# Patient Record
Sex: MALE | Race: WHITE | NOT HISPANIC OR LATINO | Employment: OTHER | URBAN - METROPOLITAN AREA
[De-identification: names, ages, dates, MRNs, and addresses within clinical notes are randomized per-mention and may not be internally consistent; named-entity substitution may affect disease eponyms.]

---

## 2017-01-06 ENCOUNTER — GENERIC CONVERSION - ENCOUNTER (OUTPATIENT)
Dept: OTHER | Facility: OTHER | Age: 67
End: 2017-01-06

## 2017-01-06 LAB
INR PPP: 1.7
PROTHROMBIN TIME: 17.6 S

## 2017-01-26 ENCOUNTER — GENERIC CONVERSION - ENCOUNTER (OUTPATIENT)
Dept: OTHER | Facility: OTHER | Age: 67
End: 2017-01-26

## 2017-01-26 LAB
INR PPP: 2.2 (ref 0.8–1.2)
PROTHROMBIN TIME: 22.4 SEC (ref 9.1–12)

## 2017-01-27 ENCOUNTER — GENERIC CONVERSION - ENCOUNTER (OUTPATIENT)
Dept: OTHER | Facility: OTHER | Age: 67
End: 2017-01-27

## 2017-02-23 ENCOUNTER — GENERIC CONVERSION - ENCOUNTER (OUTPATIENT)
Dept: OTHER | Facility: OTHER | Age: 67
End: 2017-02-23

## 2017-02-23 LAB
INR PPP: 1.7 (ref 0.8–1.2)
PROTHROMBIN TIME: 17.8 SEC (ref 9.1–12)

## 2017-02-26 ENCOUNTER — GENERIC CONVERSION - ENCOUNTER (OUTPATIENT)
Dept: OTHER | Facility: OTHER | Age: 67
End: 2017-02-26

## 2017-03-09 ENCOUNTER — GENERIC CONVERSION - ENCOUNTER (OUTPATIENT)
Dept: OTHER | Facility: OTHER | Age: 67
End: 2017-03-09

## 2017-03-09 LAB
INR PPP: 2.1 (ref 0.8–1.2)
PROTHROMBIN TIME: 21.4 SEC (ref 9.1–12)

## 2017-03-10 ENCOUNTER — ALLSCRIPTS OFFICE VISIT (OUTPATIENT)
Dept: OTHER | Facility: OTHER | Age: 67
End: 2017-03-10

## 2017-03-10 ENCOUNTER — GENERIC CONVERSION - ENCOUNTER (OUTPATIENT)
Dept: OTHER | Facility: OTHER | Age: 67
End: 2017-03-10

## 2017-03-23 ENCOUNTER — GENERIC CONVERSION - ENCOUNTER (OUTPATIENT)
Dept: OTHER | Facility: OTHER | Age: 67
End: 2017-03-23

## 2017-03-23 LAB
INR PPP: 3 (ref 0.8–1.2)
PROTHROMBIN TIME: 30.7 SEC (ref 9.1–12)

## 2017-03-24 ENCOUNTER — GENERIC CONVERSION - ENCOUNTER (OUTPATIENT)
Dept: OTHER | Facility: OTHER | Age: 67
End: 2017-03-24

## 2017-04-06 ENCOUNTER — GENERIC CONVERSION - ENCOUNTER (OUTPATIENT)
Dept: OTHER | Facility: OTHER | Age: 67
End: 2017-04-06

## 2017-04-07 LAB
INR PPP: 2.1 (ref 0.8–1.2)
PROTHROMBIN TIME: 22 SEC (ref 9.1–12)

## 2017-04-21 ENCOUNTER — GENERIC CONVERSION - ENCOUNTER (OUTPATIENT)
Dept: OTHER | Facility: OTHER | Age: 67
End: 2017-04-21

## 2017-04-21 LAB
INR PPP: 1.8 (ref 0.8–1.2)
PROTHROMBIN TIME: 18.6 SEC (ref 9.1–12)

## 2017-04-24 ENCOUNTER — ALLSCRIPTS OFFICE VISIT (OUTPATIENT)
Dept: OTHER | Facility: OTHER | Age: 67
End: 2017-04-24

## 2017-04-24 ENCOUNTER — GENERIC CONVERSION - ENCOUNTER (OUTPATIENT)
Dept: OTHER | Facility: OTHER | Age: 67
End: 2017-04-24

## 2017-04-24 DIAGNOSIS — M17.0 PRIMARY OSTEOARTHRITIS OF BOTH KNEES: ICD-10-CM

## 2017-04-25 ENCOUNTER — GENERIC CONVERSION - ENCOUNTER (OUTPATIENT)
Dept: OTHER | Facility: OTHER | Age: 67
End: 2017-04-25

## 2017-05-01 ENCOUNTER — TRANSCRIBE ORDERS (OUTPATIENT)
Dept: ADMINISTRATIVE | Facility: HOSPITAL | Age: 67
End: 2017-05-01

## 2017-05-01 ENCOUNTER — HOSPITAL ENCOUNTER (OUTPATIENT)
Dept: RADIOLOGY | Facility: HOSPITAL | Age: 67
Discharge: HOME | End: 2017-05-01
Payer: COMMERCIAL

## 2017-05-01 DIAGNOSIS — M17.0 PRIMARY OSTEOARTHRITIS OF BOTH KNEES: ICD-10-CM

## 2017-05-01 PROCEDURE — 73562 X-RAY EXAM OF KNEE 3: CPT

## 2017-05-03 ENCOUNTER — GENERIC CONVERSION - ENCOUNTER (OUTPATIENT)
Dept: OTHER | Facility: OTHER | Age: 67
End: 2017-05-03

## 2017-05-04 ENCOUNTER — GENERIC CONVERSION - ENCOUNTER (OUTPATIENT)
Dept: OTHER | Facility: OTHER | Age: 67
End: 2017-05-04

## 2017-05-04 LAB
INR PPP: 1.9 (ref 0.8–1.2)
PROTHROMBIN TIME: 19.4 SEC (ref 9.1–12)

## 2017-05-07 ENCOUNTER — GENERIC CONVERSION - ENCOUNTER (OUTPATIENT)
Dept: OTHER | Facility: OTHER | Age: 67
End: 2017-05-07

## 2017-05-08 ENCOUNTER — ALLSCRIPTS OFFICE VISIT (OUTPATIENT)
Dept: OTHER | Facility: OTHER | Age: 67
End: 2017-05-08

## 2017-05-09 ENCOUNTER — ALLSCRIPTS OFFICE VISIT (OUTPATIENT)
Dept: OTHER | Facility: OTHER | Age: 67
End: 2017-05-09

## 2017-05-25 ENCOUNTER — GENERIC CONVERSION - ENCOUNTER (OUTPATIENT)
Dept: OTHER | Facility: OTHER | Age: 67
End: 2017-05-25

## 2017-05-25 LAB
INR PPP: 2.4 (ref 0.8–1.2)
PROTHROMBIN TIME: 24.8 SEC (ref 9.1–12)

## 2017-05-30 ENCOUNTER — GENERIC CONVERSION - ENCOUNTER (OUTPATIENT)
Dept: OTHER | Facility: OTHER | Age: 67
End: 2017-05-30

## 2017-06-05 ENCOUNTER — GENERIC CONVERSION - ENCOUNTER (OUTPATIENT)
Dept: OTHER | Facility: OTHER | Age: 67
End: 2017-06-05

## 2017-06-08 ENCOUNTER — GENERIC CONVERSION - ENCOUNTER (OUTPATIENT)
Dept: OTHER | Facility: OTHER | Age: 67
End: 2017-06-08

## 2017-06-09 LAB
INR PPP: 2.7 (ref 0.8–1.2)
PROTHROMBIN TIME: 28.2 SEC (ref 9.1–12)

## 2017-06-12 ENCOUNTER — GENERIC CONVERSION - ENCOUNTER (OUTPATIENT)
Dept: OTHER | Facility: OTHER | Age: 67
End: 2017-06-12

## 2017-06-22 ENCOUNTER — GENERIC CONVERSION - ENCOUNTER (OUTPATIENT)
Dept: OTHER | Facility: OTHER | Age: 67
End: 2017-06-22

## 2017-06-23 LAB
INR PPP: 2.5 (ref 0.8–1.2)
PROTHROMBIN TIME: 25.3 SEC (ref 9.1–12)

## 2017-06-25 ENCOUNTER — GENERIC CONVERSION - ENCOUNTER (OUTPATIENT)
Dept: OTHER | Facility: OTHER | Age: 67
End: 2017-06-25

## 2017-07-06 ENCOUNTER — GENERIC CONVERSION - ENCOUNTER (OUTPATIENT)
Dept: OTHER | Facility: OTHER | Age: 67
End: 2017-07-06

## 2017-07-06 LAB
INR PPP: 1.9 (ref 0.8–1.2)
PROTHROMBIN TIME: 20.1 SEC (ref 9.1–12)

## 2017-07-07 ENCOUNTER — GENERIC CONVERSION - ENCOUNTER (OUTPATIENT)
Dept: OTHER | Facility: OTHER | Age: 67
End: 2017-07-07

## 2017-07-20 LAB
INR PPP: 1.6 (ref 0.8–1.2)
PROTHROMBIN TIME: 17.1 SEC (ref 9.1–12)

## 2017-07-21 ENCOUNTER — GENERIC CONVERSION - ENCOUNTER (OUTPATIENT)
Dept: OTHER | Facility: OTHER | Age: 67
End: 2017-07-21

## 2017-08-04 ENCOUNTER — GENERIC CONVERSION - ENCOUNTER (OUTPATIENT)
Dept: OTHER | Facility: OTHER | Age: 67
End: 2017-08-04

## 2017-08-04 LAB
INR PPP: 2.4 (ref 0.8–1.2)
PROTHROMBIN TIME: 23.5 SEC (ref 9.1–12)

## 2017-08-15 ENCOUNTER — ALLSCRIPTS OFFICE VISIT (OUTPATIENT)
Dept: OTHER | Facility: OTHER | Age: 67
End: 2017-08-15

## 2017-08-15 ENCOUNTER — TRANSCRIBE ORDERS (OUTPATIENT)
Dept: ADMINISTRATIVE | Facility: HOSPITAL | Age: 67
End: 2017-08-15

## 2017-08-15 DIAGNOSIS — G47.33 OBSTRUCTIVE SLEEP APNEA (ADULT) (PEDIATRIC): Primary | ICD-10-CM

## 2017-08-17 LAB
INR PPP: 1.9 (ref 0.8–1.2)
PROTHROMBIN TIME: 19.1 SEC (ref 9.1–12)

## 2017-08-18 ENCOUNTER — GENERIC CONVERSION - ENCOUNTER (OUTPATIENT)
Dept: OTHER | Facility: OTHER | Age: 67
End: 2017-08-18

## 2017-09-07 LAB
INR PPP: 2 (ref 0.8–1.2)
PROTHROMBIN TIME: 19.8 SEC (ref 9.1–12)

## 2017-09-17 ENCOUNTER — HOSPITAL ENCOUNTER (OUTPATIENT)
Dept: SLEEP CENTER | Facility: HOSPITAL | Age: 67
Discharge: HOME | End: 2017-09-17
Attending: INTERNAL MEDICINE
Payer: COMMERCIAL

## 2017-09-17 DIAGNOSIS — G47.33 OBSTRUCTIVE SLEEP APNEA (ADULT) (PEDIATRIC): ICD-10-CM

## 2017-09-17 PROCEDURE — 95811 POLYSOM 6/>YRS CPAP 4/> PARM: CPT

## 2017-09-26 ENCOUNTER — GENERIC CONVERSION - ENCOUNTER (OUTPATIENT)
Dept: OTHER | Facility: OTHER | Age: 67
End: 2017-09-26

## 2017-09-28 LAB
INR PPP: 2.3 (ref 0.8–1.2)
PROTHROMBIN TIME: 22.5 SEC (ref 9.1–12)

## 2017-09-29 ENCOUNTER — GENERIC CONVERSION - ENCOUNTER (OUTPATIENT)
Dept: OTHER | Facility: OTHER | Age: 67
End: 2017-09-29

## 2017-10-12 LAB
INR PPP: 3.2 (ref 0.8–1.2)
PROTHROMBIN TIME: 31.1 SEC (ref 9.1–12)

## 2017-10-13 ENCOUNTER — GENERIC CONVERSION - ENCOUNTER (OUTPATIENT)
Dept: OTHER | Facility: OTHER | Age: 67
End: 2017-10-13

## 2017-10-25 LAB
A/G RATIO (HISTORICAL): 1.8 (ref 1.2–2.2)
ALBUMIN SERPL BCP-MCNC: 4.4 G/DL (ref 3.6–4.8)
ALP SERPL-CCNC: 36 IU/L (ref 39–117)
ALT SERPL W P-5'-P-CCNC: 20 IU/L (ref 0–44)
AST SERPL W P-5'-P-CCNC: 23 IU/L (ref 0–40)
BILIRUB SERPL-MCNC: 0.6 MG/DL (ref 0–1.2)
BUN SERPL-MCNC: 16 MG/DL (ref 8–27)
BUN/CREA RATIO (HISTORICAL): 13 (ref 10–24)
CALCIUM SERPL-MCNC: 9.6 MG/DL (ref 8.6–10.2)
CHLORIDE SERPL-SCNC: 102 MMOL/L (ref 96–106)
CHOLEST SERPL-MCNC: 151 MG/DL (ref 100–199)
CHOLEST/HDLC SERPL: 3.4 RATIO UNITS (ref 0–5)
CO2 SERPL-SCNC: 26 MMOL/L (ref 18–29)
CREAT SERPL-MCNC: 1.24 MG/DL (ref 0.76–1.27)
EGFR AFRICAN AMERICAN (HISTORICAL): 70 ML/MIN/1.73
EGFR-AMERICAN CALC (HISTORICAL): 60 ML/MIN/1.73
GLUCOSE SERPL-MCNC: 102 MG/DL (ref 65–99)
HDLC SERPL-MCNC: 44 MG/DL
INR PPP: 2.3 (ref 0.8–1.2)
LDLC SERPL CALC-MCNC: 88 MG/DL (ref 0–99)
POTASSIUM SERPL-SCNC: 4.9 MMOL/L (ref 3.5–5.2)
PROTHROMBIN TIME: 22.9 SEC (ref 9.1–12)
SODIUM SERPL-SCNC: 144 MMOL/L (ref 134–144)
TOT. GLOBULIN, SERUM (HISTORICAL): 2.5 G/DL (ref 1.5–4.5)
TOTAL PROTEIN (HISTORICAL): 6.9 G/DL (ref 6–8.5)
TRIGL SERPL-MCNC: 94 MG/DL (ref 0–149)
VLDLC SERPL CALC-MCNC: 19 MG/DL (ref 5–40)

## 2017-10-26 ENCOUNTER — GENERIC CONVERSION - ENCOUNTER (OUTPATIENT)
Dept: OTHER | Facility: OTHER | Age: 67
End: 2017-10-26

## 2017-10-30 ENCOUNTER — ALLSCRIPTS OFFICE VISIT (OUTPATIENT)
Dept: OTHER | Facility: OTHER | Age: 67
End: 2017-10-30

## 2017-11-03 ENCOUNTER — ALLSCRIPTS OFFICE VISIT (OUTPATIENT)
Dept: OTHER | Facility: OTHER | Age: 67
End: 2017-11-03

## 2017-11-04 NOTE — PROGRESS NOTES
Assessment  Assessed    1  Atrial fibrillation (427 31) (I48 91)   2  Atrial flutter (427 32) (I48 92)   3  Catheter Ablation Atrial Flutter   4  Hypertension (401 9) (I10)   5  WILL (obstructive sleep apnea) (327 23) (G47 33)   6  Dyslipidemia (272 4) (E78 5)    Plan  Atrial fibrillation    · EKG/ECG- POC; Status:Complete;   Done: 05UQA8055 11:31AM   Perform: In Office; Last Updated Dc Shaver; 11/3/2017 11:31:16 AM;Ordered; For:Atrial fibrillation; Ordered By:Mike Murillo;   · Continue with our present treatment plan ; Status:Complete;   Done: 28CBI3119   Ordered; For:Atrial fibrillation; Ordered By:Mike Murillo;   · Restrict your sodium (salt) intake to 2 grams per day ; Status:Complete;   Done:  37PPY5426   Ordered; For:Atrial fibrillation; Ordered By:Mike Murillo;   · Weigh yourself every day ; Status:Complete;   Done: 31ZWA0415   Ordered; For:Atrial fibrillation; Ordered By:Mike Murillo;   · Follow-up visit in 4 Months Evaluation and Treatment  Follow-up  Status: Complete   Done: 42BFO4882   Ordered; For: Atrial fibrillation; Ordered By: Va Cain Performed:  Order Comments: pt on wait list Due: 37JDJ8277; Last Updated By: Isi Avendano; 11/3/2017 11:48:25 AM    Discussion/Summary  Cardiology Discussion Summary Free Text Note Form [facility]:   PAF - He remains in atrial fibrillation but his heart rate remained controlled  He has now finally sought consultation with sleep medicine, and has been on CPAP for the last 2 weeks  I am going to let him get treated for this for several months, and then we will discuss so rhythm control strategy for his atrial fibrillation  We will have back in 4 months  He has essentially no symptoms at this time other than fatigue  He will remain on Coumadin for stroke prevention  flujesse - [facility] is status post ablation for this  - He is tolerating the TriCor and pravastatin  His cholesterol panel has improved with this   We will continue to follow blood work along with his PCP  - I continue to encourage Skylar Arora to follow-up regarding mask fitting and meeting with the sleep physician  He has yet to do this  As stated above I'm referring him to sleep medicine  He should then have a follow-up CPAP titration sleep study  Counseling Documentation With Imm: The patient was counseled regarding diagnostic results,-- instructions for management,-- impressions  total time of encounter was 25 minutes  Chief Complaint  Chief Complaint Free Text Note Form: Six month Follow up with EKG      History of Present Illness  Cardiology HPI Free Text Note [facility]: Skylar Arora returns for followup given his history of paroxysmal atrial fibrillation  This was diagnosed at the time of a colonoscopy, performed by Dr Tiara Morgan a few years ago  After starting metoprolol, in follow-up he was back to sinus rhythm  Then in January 2015 Skylar Arora arrived in atrial flutter with 2:1 conduction  He however has no symptoms other than is ongoing fatigue  At that time I set him up for a cardioversion that next week  He converted without any problems  He then met Dr Jayne Cortes of our EP service  He then came back a week later and had his atrial flutter ablated  This went successfully  He was in sinus rhythm up until our last appointment in which he was found to be back in atrial fibrillation, with controlled rates  He has been on Coumadin for years given his history of a DVT and being found to have factor 5 Leiden mutation-homozygous  we 1st met him, he also went for a sleep study, and was found to have severe obstructive sleep apnea  He however did not tolerate the CPAP mask due to claustrophobia  I encouraged him to seek consultation with sleep medicine, and for a while he did not do this  However after our last appointment he did do this  He then had proper mask fitting, and now is on CPAP  He has been on it about 2 weeks  tells me he feels about the same as last visit   He also denies any symptoms of his atrial fibrillation or atrial flutter  He continues to have fatigue  He denies any palpitations, lightheadedness or syncope he denies any chest pain or worsening shortness of breath  Certainly he has shortness of breath with exertion particularly upper levels of exertion, which has not changed over the last couple years  He denies any lightheadedness or syncope  He denies any symptoms of congestive heart failure  He stopped the simvastatin due to side effects but he tolerated the pravastatin  Atrial Fibrillation (Follow-Up): The patient presents with paroxysmal atrial fibrillation  The treatment strategy for this patient is rhythm control  He states his atrial fibrillation has been stable since the last visit  He has no significant interval events  Symptoms: stable palpitations,-- denies chest pain,-- denies exercise intolerance,-- stable dyspnea on exertion-- and-- denies dizziness  Associated symptoms include no syncope,-- no focal neurologic deficit,-- no tendency for easy bleeding-- and-- no tendency for easy bruising  Monitoring: The patient has regular PT/INR checks-- and-- anticoagulation control has been good  Risks: no increased risk for falling  Medications: the patient is adherent with his medication regimen  -- He denies medication side effects  Disease Management: the patient is doing well with his goals  Atrial Flutter (Brief): The patient is being seen for an initial evaluation of atrial flutter  Symptoms:  fatigue  The patient is currently experiencing symptoms  No associated symptoms are reported  Current treatment includes beta blockers  By report, there is good compliance with treatment and good tolerance of treatment  Pertinent medical history:  atrial fibrillation  Hyperlipidemia (Follow-Up): The patient states his hyperlipidemia has been under good control since the last visit  He has no significant interval events  Symptoms: The patient is currently asymptomatic   Associated symptoms include focal neurologic deficit, but-- no memory loss  Medications: the patient is not adherent with his medication regimen  -- the patient complains of medication side effects  Medication side effects: muscle aches  the patient's last LDL was 98 mg/dL  The patient is due for a lipid panel-- and-- liver function tests  Review of Systems  Cardiology Male ROS:     Cardiac: palpitations present , but-- as noted in HPI  Skin: No complaints of nonhealing sores or skin rash  Genitourinary: No complaints of recurrent urinary tract infections, frequent urination at night, difficult urination, blood in urine, kidney stones, loss of bladder control, no kidney or prostate problems, no erectile dysfunction  Psychological: No complaints of feeling depressed, anxiety, panic attacks, or difficulty concentrating  General: No complaints of trouble sleeping, lack of energy, fatigue, appetite changes, weight changes, fever, frequent infections, or night sweats  Respiratory: shortness of breath, but-- as noted in HPI  HEENT: snoring, but-- as noted in HPI   Gastrointestinal: No complaints of liver problems, nausea, vomiting, heartburn, constipation, bloody stools, diarrhea, problems swallowing, adbominal pain, or rectal bleeding  Hematologic: No complaints of bleeding disorders, anemia, blood clots, or excessive brusing  Neurological: No complaints of numbness, tingling, dizziness, weakness, seizures, headaches, syncope or fainting, AM fatigue, daytime sleepiness, no witnessed apnea episodes  Musculoskeletal: No complaints of arthritis, back pain, or painfull swelling  ROS Reviewed:   ROS reviewed  Active Problems  Problems    1  Anticoagulated on warfarin (V58 61) (Z79 01)   2  Atrial fibrillation (427 31) (I48 91)   3  Atrial flutter (427 32) (I48 92)   4  Catheter Ablation Atrial Flutter   5  Depression, major, single episode, moderate (296 22) (F32 1)   6  Dyslipidemia (272 4) (E78 5)   7  Encounter for screening colonoscopy (V76 51) (Z12 11)   8  Erectile dysfunction, unspecified erectile dysfunction type (607 84) (N52 9)   9  Factor V Leiden mutation (289 81) (D68 51)   10  Flu vaccine need (V04 81) (Z23)   11  Hypertension (401 9) (I10)   12  Insomnia (780 52) (G47 00)   13  Lumbar herniated disc (722 10) (M51 26)   14  Need for hepatitis C screening test (V73 89) (Z11 59)   15  WILL (obstructive sleep apnea) (327 23) (G47 33)   16  Primary osteoarthritis of both knees (715 16) (M17 0)   17  Screening PSA (prostate specific antigen) (V76 44) (Z12 5)    Past Medical History  Problems    1  History of Acute maxillary sinusitis (461 0) (J01 00)   2  History of Acute non-recurrent sinusitis, unspecified location (461 9) (J01 90)   3  Acute upper respiratory infection (465 9) (J06 9)   4  History of Acute venous embolism and thrombosis of deep vessels of proximal lower   extremity (453 41) (I82 4Y9)   5  History of Bronchospasm, acute (519 11) (J98 01)   6  History of Flu-like symptoms (780 99) (R68 89)   7  History of acute sinusitis (V12 69) (Z87 09)   8  History of Paroxysmal atrial fibrillation (427 31) (I48 0)  Active Problems And Past Medical History Reviewed: The active problems and past medical history were reviewed and updated today  Surgical History  Problems    1  History of Colon Surgery   2  History of Lower Back Surgery  Surgical History Reviewed: The surgical history was reviewed and updated today  Family History  Mother    1  Family history of lung cancer (V16 1) (Z80 1)  Family History    2  Denied: Family history of substance abuse   3  No family history of mental disorder  Family History Reviewed: The family history was reviewed and updated today  Social History  Problems    · Caffeine use (V49 89) (F15 90)   · Never a smoker  Social History Reviewed: The social history was reviewed and updated today  The social history was reviewed and is unchanged        Current Meds   1  Celecoxib 200 MG Oral Capsule; take 1 capsule daily; Therapy: 76UEV6613 to (Last Rx:05Jun2017)  Requested for: 99JPG8818 Ordered   2  DULoxetine HCl - 30 MG Oral Capsule Delayed Release Particles; take 1 capsule daily; Therapy: 30TIB6697 to (Evaluate:89Duz0778)  Requested for: 14XGM1158; Last   Rx:30Oct2017 Ordered   3  Fenofibrate 145 MG Oral Tablet; TAKE 1 TABLET DAILY; Therapy: 37JMI2927 to (DTUEDHLO:40AGL8289)  Requested for: 32TDH7373; Last   Rx:02Jun2017 Ordered   4  Fluticasone Propionate 50 MCG/ACT Nasal Suspension; USE 2 SPRAYS IN EACH   NOSTRIL ONCE DAILY; Therapy: 78JGQ2274 to (Azam Steward)  Requested for: 26Oct2017; Last   PM:65RFI5960 Ordered   5  Metoprolol Succinate  MG Oral Tablet Extended Release 24 Hour; Take 1 tablet by   mouth  daily; Therapy: 72UUU6806 to (Evaluate:01Apr2018)  Requested for: 33YDM5923; Last   Rx:47Tsx3605 Ordered   6  Pravastatin Sodium 40 MG Oral Tablet; Take 1 tablet by mouth  daily; Therapy: 69OUD0541 to (Evaluate:01Jan2018)  Requested for: 59ZMX4725; Last   Rx:03Oct2017; Status: ACTIVE - Retrospective By Protocol Authorization Ordered   7  Warfarin Sodium 5 MG Oral Tablet; 1/2 - 1 tab daily based on PT/INR results; Therapy: 80WEP9096 to [phone])  Requested for: 24Apr2017; Last   Rx:24Apr2017 Ordered   8  Zolpidem Tartrate 10 MG Oral Tablet; TAKE 1 TABLET AT  BEDTIME AS NEEDED FOR   INSOMNIA; Therapy: 13XMC1239 to (Evaluate:56Bmq0848); Last Rx:10Mar2017 Ordered  Medication List Reviewed: The medication list was reviewed and updated today  Allergies  Medication    1  Morphine Derivatives    Vitals  Vital Signs    Recorded: 42KLD1694 11:29AM   Heart Rate 85   Systolic 430   Diastolic 64   Height 6 ft    Weight 273 lb    BMI Calculated 37 03   BSA Calculated 2 43     Physical Exam    Constitutional   General appearance: No acute distress, well appearing and well nourished  overweight     Eyes   Conjunctiva and Sclera examination: Conjunctiva pink, sclera anicteric  Ears, Nose, Mouth, and Throat - Oropharynx: Clear, nares are clear, mucous membranes are moist    Neck   Neck and thyroid: Normal, supple, trachea midline, no thyromegaly  Pulmonary   Respiratory effort: No increased work of breathing or signs of respiratory distress  Auscultation of lungs: Clear to auscultation, no rales, no rhonchi, no wheezing, good air movement  Cardiovascular   Auscultation of heart: Abnormal   The heart rate was normal  The rhythm was regular  Heart sounds: the heart sounds were distant, but-- normal S1,-- normal S2-- and-- no gallop heard  no murmurs were heard  Carotid pulses: Normal, 2+ bilaterally  Peripheral vascular exam: Normal pulses throughout, no tenderness, erythema or swelling  Pedal pulses: Normal, 2+ bilaterally  Examination of extremities for edema and/or varicosities: Abnormal   bilateral ankle 0 5+ pitting edema-- and-- bilateral pretibial 0 5+ pitting edema  varicosities noted bilaterally  Abdomen   Abdomen: Non-tender and no distention  Liver and spleen: No hepatomegaly or splenomegaly  Musculoskeletal Gait and station: Normal gait  -- Digits and nails: Normal without clubbing or cyanosis  -- Inspection/palpation of joints, bones, and muscles: Normal, ROM normal     Skin - Skin and subcutaneous tissue: Normal without rashes or lesions  Skin is warm and well perfused, normal turgor  Neurologic - Cranial nerves: II - XII intact  Psychiatric - Orientation to person, place, and time: Normal -- Mood and affect: Normal       Results/Data  ECG Report:   Rhythm and rate: atrial fibrillation--   ventricular rate is 85 beats per minute  T waves:   there are nonspecific ST-T wave changes  Comparison to prior ECGs: no interval change  Health Management  Dyslipidemia   (1) COMPREHENSIVE METABOLIC PANEL; every 1 week; Last 76QJV6727; Next Due: 60HNH6356;   Overdue  (LC) Lipid Panel; every 1 week; Last 58AJC9044; Next Due: 44QQF9429; Overdue  Encounter for screening colonoscopy   COLONOSCOPY; every 5 years; Last 79GPN3767; Next Due: P2168091; Overdue  Screening PSA (prostate specific antigen)   (1) PSA (SCREEN) (Dx V76 44 Screen for Prostate Cancer); every 1 week; Next Due: 23DEQ7044;   Overdue    Future Appointments    Date/Time Provider Specialty Site   12/11/2017 01:40 PM Negro Hess DO Pulmonary Medicine [facility]   12/11/2017 08:30 AM Jamar Rubalcava, [address] [place] [address]     Signatures   Electronically signed by : MARY Dela Cruz ; Nov  3 2017 11:49AM EST                       (Author)

## 2017-11-10 ENCOUNTER — GENERIC CONVERSION - ENCOUNTER (OUTPATIENT)
Dept: OTHER | Facility: OTHER | Age: 67
End: 2017-11-10

## 2017-11-10 LAB
INR PPP: 3.1 (ref 0.8–1.2)
PROTHROMBIN TIME: 29.5 SEC (ref 9.1–12)

## 2017-11-24 LAB
INR PPP: 2.9 (ref 0.8–1.2)
PROTHROMBIN TIME: 28.1 SEC (ref 9.1–12)

## 2017-11-27 ENCOUNTER — GENERIC CONVERSION - ENCOUNTER (OUTPATIENT)
Dept: OTHER | Facility: OTHER | Age: 67
End: 2017-11-27

## 2017-12-11 ENCOUNTER — ALLSCRIPTS OFFICE VISIT (OUTPATIENT)
Dept: OTHER | Facility: OTHER | Age: 67
End: 2017-12-11

## 2017-12-12 ENCOUNTER — GENERIC CONVERSION - ENCOUNTER (OUTPATIENT)
Dept: OTHER | Facility: OTHER | Age: 67
End: 2017-12-12

## 2017-12-12 NOTE — PROGRESS NOTES
Assessment  1  WILL (obstructive sleep apnea) (327 23) (G47 33)    Discussion/Summary  Discussion Summary:   Mr Silvia Krause Is doing very well on his CPAP  I reviewed his compliance report today and he is wearing it every day and his AHI has dropped to an average of 8 from 62  his mask is fitting well and he knows to get replacement mask and tubing intermittently  We talked at length about risks of untreated sleep apnea as well as side effects of CPAP treatment  Will follow up on a yearly basis or sooner if needed  Counseling Documentation With Imm: The patient was counseled regarding  Goals and Barriers: The patient has the current Goals: Maintain health  The patent has the current Barriers: None identified  Active Problems    1  Anticoagulated on warfarin (V58 61) (Z79 01)   2  Atrial fibrillation (427 31) (I48 91)   3  Atrial flutter (427 32) (I48 92)   4  Catheter Ablation Atrial Flutter   5  Depression, major, single episode, moderate (296 22) (F32 1)   6  Dyslipidemia (272 4) (E78 5)   7  Encounter for screening colonoscopy (V76 51) (Z12 11)   8  Erectile dysfunction, unspecified erectile dysfunction type (607 84) (N52 9)   9  Factor V Leiden mutation (289 81) (D68 51)   10  Flu vaccine need (V04 81) (Z23)   11  Hypertension (401 9) (I10)   12  Insomnia (780 52) (G47 00)   13  Lumbar herniated disc (722 10) (M51 26)   14  Need for hepatitis C screening test (V73 89) (Z11 59)   15  WILL (obstructive sleep apnea) (327 23) (G47 33)   16  Primary osteoarthritis of both knees (715 16) (M17 0)   17  Screening PSA (prostate specific antigen) (V76 44) (Z12 5)    Chief Complaint  Chief Complaint [facility]: Patient is here today for follow up of chronic conditions described in HPI  History of Present Illness  HPI: Mr Jazzy Bell is doing very well with regards to his sleep apnea  He has had his machine for several months and is using it all night every night   He feels his energy is improved and his sleep is improved  He denies any snoring through the mask and wears it for 6 and 0 5 hours a night  He no longer dozes off during the day and is able to go to bed at a normal time and wake up feeling refreshed  Review of Systems  Complete-Male Pulm:  Constitutional: No fever or chills, feels well, no tiredness, no recent weight gain or weight loss  Eyes: no complaints of vision problems  ENT: no rhinitis, no PND, no epistaxis  Cardiovascular: no palpitations, no chest pain  Respiratory: as noted in HPI  Gastrointestinal: no complaints of esophageal reflux, no abdominal pain  Genitourinary: no urinary retention  Musculoskeletal: no arthralgias, no joint swelling, no myalgias  Integumentary: no rash, no lesions  Neurological: no headache, no fainting, no weakness  Psychiatric: no anxiety, no depression  Hematologic/Lymphatic: no complaints of swollen glands  ROS Reviewed:   ROS reviewed  Past Medical History    1  History of Acute maxillary sinusitis (461 0) (J01 00)   2  History of Acute non-recurrent sinusitis, unspecified location (461 9) (J01 90)   3  Acute upper respiratory infection (465 9) (J06 9)   4  History of Acute venous embolism and thrombosis of deep vessels of proximal lower extremity (453 41) (I82 4Y9)   5  History of Bronchospasm, acute (519 11) (J98 01)   6  History of Flu-like symptoms (780 99) (R68 89)   7  History of acute sinusitis (V12 69) (Z87 09)   8  History of Paroxysmal atrial fibrillation (427 31) (I48 0)    Surgical History  1  History of Colon Surgery   2  History of Lower Back Surgery  Surgical History Reviewed: The surgical history was reviewed and updated today  Family History  Mother    1  Family history of lung cancer (V16 1) (Z80 1)  Family History    2  Denied: Family history of substance abuse   3  No family history of mental disorder  Family History Reviewed: The family history was reviewed and updated today         Social History     · Caffeine use (V49 89) (F15 90)   · 2 cup coff a day   · Never a smoker  Social History Reviewed: The social history was reviewed and updated today  The social history was reviewed and is unchanged  Current Meds   1  Celecoxib 200 MG Oral Capsule (CeleBREX); take 1 capsule daily; Therapy: 37QQE6637 to (Claudia Devine)  Requested for: 21Nov2017; Last Rx:21Nov2017 Ordered   2  DULoxetine HCl - 30 MG Oral Capsule Delayed Release Particles; take 1 capsule daily; Therapy: 34AEH6695 to (Evaluate:29Ukz4339)  Requested for: 25NHC8118; Last Rx:30Oct2017 Ordered   3  Fenofibrate 145 MG Oral Tablet; TAKE 1 TABLET DAILY; Therapy: 08DUX1010 to (PLVBXAVH:37YZB4647)  Requested for: 12IDK5593; Last Rx:02Jun2017 Ordered   4  Fluticasone Propionate 50 MCG/ACT Nasal Suspension; USE 2 SPRAYS IN EACH NOSTRIL ONCE DAILY; Therapy: 36YUA3334 to (Kennyth Balloon)  Requested for: 26Oct2017; Last FL:96WQQ4221 Ordered   5  Metoprolol Succinate  MG Oral Tablet Extended Release 24 Hour; Take 1 tablet by mouth  daily; Therapy: 90UOX7821 to (Evaluate:01Apr2018)  Requested for: 90DIH5227; Last Rx:03Oct2017 Ordered   6  Pravastatin Sodium 40 MG Oral Tablet (Pravachol); Take 1 tablet by mouth  daily; Therapy: 32PLY0019 to (Evaluate:01Jan2018)  Requested for: 13AQN0926; Last Rx:03Oct2017; Status: ACTIVE - Retrospective By Protocol Authorization Ordered   7  Warfarin Sodium 5 MG Oral Tablet; take 1 tablet by mouth once daily; Therapy: 03BYD6679 to (Last LE:35PNS8289)  Requested for: 21CWT4094 Ordered   8  Zolpidem Tartrate 10 MG Oral Tablet (Ambien); TAKE 1 TABLET AT  BEDTIME AS NEEDED FOR INSOMNIA; Therapy: 79YVK6122 to (Evaluate:03Xbb5228); Last Rx:10Mar2017 Ordered  Medication List Reviewed: The medication list was reviewed and updated today  Allergies  1   Morphine Derivatives    Vitals  Vital Signs    Recorded: 11Dec2017 01:49PM   Temperature 97 6 F   Heart Rate 83   Respiration 16   Systolic 378   Diastolic 80   Height 6 ft Weight 277 lb    BMI Calculated 37 57   BSA Calculated 2 45   O2 Saturation 95, RA       Physical Exam   Constitutional  General appearance: No acute distress, well appearing and well nourished  Ears, Nose, Mouth, and Throat  Nasal mucosa, septum, and turbinates: Normal without edema or erythema  Lips, teeth, and gums: Normal, good dentition  Oropharynx: Normal with no erythema, edema, exudate or lesions  Neck  Neck: Supple, symmetric, trachea midline, no masses  Jugular veins: Normal    Pulmonary  Auscultation of lungs: Clear to auscultation, no rales, no crackles, no wheezing  Cardiovascular  Auscultation of heart: Normal rate and rhythm, normal S1 and S2, no murmurs  Examination of extremities for edema and/or varicosities: Normal    Abdomen  Abdomen: Soft, non-tender  Lymphatic  Palpation of lymph nodes in neck: No lymphadenopathy  Musculoskeletal  Gait and station: Normal    Digits and nails: Normal without clubbing or cyanosis  Neurologic  Mental Status: Normal  Not confused, no evidence of dementia, good comprehension, good concentration  Skin  Skin and subcutaneous tissue: Limited exam shows no rash  Psychiatric  Orientation to person, place and time: Normal    Mood and affect: Normal        Health Management  Dyslipidemia   (1) COMPREHENSIVE METABOLIC PANEL; every 1 week; Last 38IVH0571; Next Due: 85FEO5316; Overdue  (LC) Lipid Panel; every 1 week; Last 93SNC0192; Next Due: 68WAE6850; Overdue  Encounter for screening colonoscopy   COLONOSCOPY; every 5 years; Last 22EWU9312; Next Due: G7995727; Overdue  Screening PSA (prostate specific antigen)   (1) PSA (SCREEN) (Dx V76 44 Screen for Prostate Cancer); every 1 week; Next Due: 95BZM8907; Overdue    Future Appointments    Date/Time Provider Specialty Site   12/12/2017 07:45 AM Barbara Moore MD Family Medicine [facility] [address]       Signatures   Electronically signed by : Tess Heredia DO; Dec 11 2017  2:34PM EST (Author)

## 2017-12-29 ENCOUNTER — GENERIC CONVERSION - ENCOUNTER (OUTPATIENT)
Dept: OTHER | Facility: OTHER | Age: 67
End: 2017-12-29

## 2017-12-29 LAB
INR PPP: 3.4 (ref 0.8–1.2)
PROTHROMBIN TIME: 32.4 SEC (ref 9.1–12)

## 2018-01-10 NOTE — RESULT NOTES
Verified Results  (1) PT WITH INR [id number] 08:05AM Nirmal Cargo     Test Name Result Flag Reference   INR 2 5 H 0 8-1 2   Reference interval is for non-anticoagulated patients                   Suggested INR therapeutic range for Vitamin K                 antagonist therapy:                    Standard Dose (moderate intensity                                   therapeutic range):       2 0 - 3 0                    Higher intensity therapeutic range       2 5 - 3 5   Prothrombin Time 26 4 sec H 9 1-12 0

## 2018-01-10 NOTE — RESULT NOTES
Verified Results  (1) PT WITH INR [id number] 09:31AM Stanley Mullins     Test Name Result Flag Reference   INR 2 3 H 0 8-1 2   Reference interval is for non-anticoagulated patients                   Suggested INR therapeutic range for Vitamin K                 antagonist therapy:                    Standard Dose (moderate intensity                                   therapeutic range):       2 0 - 3 0                    Higher intensity therapeutic range       2 5 - 3 5   Prothrombin Time 24 6 sec H 9 1-12 0

## 2018-01-10 NOTE — RESULT NOTES
Verified Results  (1) PT WITH INR [id number] 09:55AM Jean Paul Angel     Test Name Result Flag Reference   INR 2 7 H 0 8-1 2   Reference interval is for non-anticoagulated patients                   Suggested INR therapeutic range for Vitamin K                 antagonist therapy:                    Standard Dose (moderate intensity                                   therapeutic range):       2 0 - 3 0                    Higher intensity therapeutic range       2 5 - 3 5   Prothrombin Time 27 3 sec H 9 1-12 0

## 2018-01-10 NOTE — RESULT NOTES
Verified Results  (1) PT WITH INR [id number] 11:54AM Brayden Ala     Test Name Result Flag Reference   INR 3 1 H 0 8-1 2   Reference interval is for non-anticoagulated patients                   Suggested INR therapeutic range for Vitamin K                 antagonist therapy:                    Standard Dose (moderate intensity                                   therapeutic range):       2 0 - 3 0                    Higher intensity therapeutic range       2 5 - 3 5   Prothrombin Time 29 5 sec H 9 1-12 0

## 2018-01-11 ENCOUNTER — GENERIC CONVERSION - ENCOUNTER (OUTPATIENT)
Dept: OTHER | Facility: OTHER | Age: 68
End: 2018-01-11

## 2018-01-11 LAB
INR PPP: 2.2 (ref 0.8–1.2)
PROTHROMBIN TIME: 22.2 SEC (ref 9.1–12)

## 2018-01-11 NOTE — RESULT NOTES
Verified Results  (1) PT WITH INR [id number] 10:46AM Neyda Vega     Test Name Result Flag Reference   INR 2 4 H 0 8-1 2   Reference interval is for non-anticoagulated patients                   Suggested INR therapeutic range for Vitamin K                 antagonist therapy:                    Standard Dose (moderate intensity                                   therapeutic range):       2 0 - 3 0                    Higher intensity therapeutic range       2 5 - 3 5   Prothrombin Time 24 8 sec H 9 1-12 0

## 2018-01-11 NOTE — RESULT NOTES
Verified Results  (1) PT WITH INR 26Jan2017 09:48AM Logan Hodgson     Test Name Result Flag Reference   INR 2 2 H 0 8-1 2   Reference interval is for non-anticoagulated patients                   Suggested INR therapeutic range for Vitamin K                 antagonist therapy:                    Standard Dose (moderate intensity                                   therapeutic range):       2 0 - 3 0                    Higher intensity therapeutic range       2 5 - 3 5   Prothrombin Time 22 4 sec H 9 1-12 0

## 2018-01-11 NOTE — RESULT NOTES
Verified Results  (1) PT WITH INR [id number] 01:33PM Paris Vitaleippo     Test Name Result Flag Reference   INR 1 9 H 0 8-1 2   Reference interval is for non-anticoagulated patients                   Suggested INR therapeutic range for Vitamin K                 antagonist therapy:                    Standard Dose (moderate intensity                                   therapeutic range):       2 0 - 3 0                    Higher intensity therapeutic range       2 5 - 3 5   Prothrombin Time 19 7 sec H 9 1-12 0

## 2018-01-11 NOTE — RESULT NOTES
Message   Recorded as Task   Date: 06/10/2016 09:35 AM, Created By: Jefferson Avina   Task Name: Call Patient with results   Assigned To:  Archie Aceves   Regarding Patient: Nae Wood, Status: Active   CommentJocelyne Nehemiah - 10 Derik 2016 9:35 AM     Patient Phone: [phone]    same dose   Henny Gallegos - 10 Derik 2016 9:47 AM     TASK EDITED  PT AWARE        Signatures   Electronically signed by : Cherie Constantino, ; Derik 10 2016  9:47AM EST                       (Author)

## 2018-01-11 NOTE — RESULT NOTES
Verified Results  (1) PT WITH INR [id number] 10:57AM Charleston Prim     Test Name Result Flag Reference   INR 2 3 H 0 8-1 2   Reference interval is for non-anticoagulated patients                   Suggested INR therapeutic range for Vitamin K                 antagonist therapy:                    Standard Dose (moderate intensity                                   therapeutic range):       2 0 - 3 0                    Higher intensity therapeutic range       2 5 - 3 5   Prothrombin Time 22 5 sec H 9 1-12 0

## 2018-01-11 NOTE — RESULT NOTES
Verified Results  (1) PT WITH INR [id number] 07:55AM Mak Daily     Test Name Result Flag Reference   INR 2 3 H 0 8-1 2   Reference interval is for non-anticoagulated patients                   Suggested INR therapeutic range for Vitamin K                 antagonist therapy:                    Standard Dose (moderate intensity                                   therapeutic range):       2 0 - 3 0                    Higher intensity therapeutic range       2 5 - 3 5   Prothrombin Time 23 3 sec H 9 1-12 0

## 2018-01-12 ENCOUNTER — GENERIC CONVERSION - ENCOUNTER (OUTPATIENT)
Dept: OTHER | Facility: OTHER | Age: 68
End: 2018-01-12

## 2018-01-12 NOTE — RESULT NOTES
Verified Results  (1) PT WITH INR 20Oct2016 09:51AM Artisi Burch     Test Name Result Flag Reference   INR 2 2 H 0 8-1 2   Reference interval is for non-anticoagulated patients  Suggested INR therapeutic range for Vitamin K                 antagonist therapy:                    Standard Dose (moderate intensity                                   therapeutic range):       2 0 - 3 0                    Higher intensity therapeutic range       2 5 - 3 5   Prothrombin Time 22 4 sec H 9 1-12 0       Plan  Dyslipidemia    · Pravastatin Sodium 40 MG Oral Tablet (Pravachol);  Take 1 tablet daily

## 2018-01-12 NOTE — RESULT NOTES
Verified Results  (1) PT WITH INR 06Apr2017 02:18PM Renetta Mulligan     Test Name Result Flag Reference   INR 2 1 H 0 8-1 2   Reference interval is for non-anticoagulated patients  Suggested INR therapeutic range for Vitamin K                 antagonist therapy:                    Standard Dose (moderate intensity                                   therapeutic range):       2 0 - 3 0                    Higher intensity therapeutic range       2 5 - 3 5   Prothrombin Time 22 0 sec H 9 1-12 0       Plan  Dyslipidemia    · Pravastatin Sodium 40 MG Oral Tablet (Pravachol);  Take 1 tablet daily

## 2018-01-13 ENCOUNTER — GENERIC CONVERSION - ENCOUNTER (OUTPATIENT)
Dept: OTHER | Facility: OTHER | Age: 68
End: 2018-01-13

## 2018-01-13 VITALS
WEIGHT: 269 LBS | HEART RATE: 86 BPM | DIASTOLIC BLOOD PRESSURE: 62 MMHG | BODY MASS INDEX: 36.48 KG/M2 | SYSTOLIC BLOOD PRESSURE: 166 MMHG

## 2018-01-13 NOTE — RESULT NOTES
Verified Results  (1) PT WITH INR [id number] 02:59PM Jean Marie Villarreal     Test Name Result Flag Reference   INR 2 0 H 0 8-1 2   Reference interval is for non-anticoagulated patients                   Suggested INR therapeutic range for Vitamin K                 antagonist therapy:                    Standard Dose (moderate intensity                                   therapeutic range):       2 0 - 3 0                    Higher intensity therapeutic range       2 5 - 3 5   Prothrombin Time 21 3 sec H 9 1-12 0

## 2018-01-13 NOTE — RESULT NOTES
Verified Results  [facility]) CMP14+eGFR [id number] 09:48AM Taye Favia     Test Name Result Flag Reference   Glucose, Serum 112 mg/dL H 65-99   BUN 15 mg/dL  8-27   Creatinine, Serum 1 11 mg/dL  0 76-1 27   eGFR If NonAfricn Am 69 mL/min/1 73  >59   eGFR If Africn Am 80 mL/min/1 73  >59   BUN/Creatinine Ratio 14  10-22   Sodium, Serum 140 mmol/L  136-144   Potassium, Serum 4 3 mmol/L  3 5-5 2   Chloride, Serum 101 mmol/L     Carbon Dioxide, Total 25 mmol/L  18-29   Calcium, Serum 9 4 mg/dL  8 6-10 2   Protein, Total, Serum 6 7 g/dL  6 0-8 5   Albumin, Serum 4 1 g/dL  3 6-4 8   Globulin, Total 2 6 g/dL  1 5-4 5   A/G Ratio 1 6  1 1-2 5   Bilirubin, Total 0 6 mg/dL  0 0-1 2   Alkaline Phosphatase, S 33 IU/L L    AST (SGOT) 19 IU/L  0-40   ALT (SGPT) 16 IU/L  0-44     (LC) Lipid Panel [id number] 09:48AM Taye Favia     Test Name Result Flag Reference   Cholesterol, Total 169 mg/dL  100-199   Triglycerides 110 mg/dL  0-149   HDL Cholesterol 46 mg/dL  >39   According to ATP-III Guidelines, HDL-C >59 mg/dL is considered a  negative risk factor for CHD  VLDL Cholesterol Kermit 22 mg/dL  5-40   LDL Cholesterol Calc 101 mg/dL H 0-99     (1) PT WITH INR [id number] 09:45AM Taye Favia     Test Name Result Flag Reference   INR 3 2 H 0 8-1 2   Reference interval is for non-anticoagulated patients  Suggested INR therapeutic range for Vitamin K                 antagonist therapy:                    Standard Dose (moderate intensity                                   therapeutic range):       2 0 - 3 0                    Higher intensity therapeutic range       2 5 - 3 5   Prothrombin Time 32 8 sec H 9 1-12 0       Plan  Dyslipidemia    · (LC) Lipid Panel ; every 1 week;  Last [id number]; Status:Active

## 2018-01-13 NOTE — RESULT NOTES
Verified Results  (1) PT WITH INR [id number] 08:42AM Rosalina Montague     Test Name Result Flag Reference   INR 3 1 H 0 8-1 2   Reference interval is for non-anticoagulated patients                   Suggested INR therapeutic range for Vitamin K                 antagonist therapy:                    Standard Dose (moderate intensity                                   therapeutic range):       2 0 - 3 0                    Higher intensity therapeutic range       2 5 - 3 5   Prothrombin Time 33 5 sec H 9 1-12 0

## 2018-01-13 NOTE — RESULT NOTES
Verified Results  (1) PT WITH INR [id number] 01:58PM Melissa Bullocks     Test Name Result Flag Reference   INR 2 7 H 0 8-1 2   Reference interval is for non-anticoagulated patients                   Suggested INR therapeutic range for Vitamin K                 antagonist therapy:                    Standard Dose (moderate intensity                                   therapeutic range):       2 0 - 3 0                    Higher intensity therapeutic range       2 5 - 3 5   Prothrombin Time 28 2 sec H 9 1-12 0

## 2018-01-13 NOTE — RESULT NOTES
Verified Results  (1) PT WITH INR [id number] 11:58AM Rollo Patch     Test Name Result Flag Reference   INR 3 0 H 0 8-1 2   Reference interval is for non-anticoagulated patients                   Suggested INR therapeutic range for Vitamin K                 antagonist therapy:                    Standard Dose (moderate intensity                                   therapeutic range):       2 0 - 3 0                    Higher intensity therapeutic range       2 5 - 3 5   Prothrombin Time 30 7 sec H 9 1-12 0

## 2018-01-13 NOTE — RESULT NOTES
Verified Results  (1) PT WITH INR [id number] 10:15AM Errol Ferguson     Test Name Result Flag Reference   INR 2 1 H 0 8-1 2   Reference interval is for non-anticoagulated patients                   Suggested INR therapeutic range for Vitamin K                 antagonist therapy:                    Standard Dose (moderate intensity                                   therapeutic range):       2 0 - 3 0                    Higher intensity therapeutic range       2 5 - 3 5   Prothrombin Time 21 4 sec H 9 1-12 0

## 2018-01-14 VITALS
OXYGEN SATURATION: 94 % | WEIGHT: 275 LBS | DIASTOLIC BLOOD PRESSURE: 68 MMHG | BODY MASS INDEX: 37.25 KG/M2 | HEIGHT: 72 IN | RESPIRATION RATE: 18 BRPM | TEMPERATURE: 97 F | HEART RATE: 77 BPM | SYSTOLIC BLOOD PRESSURE: 108 MMHG

## 2018-01-14 VITALS
DIASTOLIC BLOOD PRESSURE: 76 MMHG | WEIGHT: 272 LBS | SYSTOLIC BLOOD PRESSURE: 112 MMHG | BODY MASS INDEX: 36.84 KG/M2 | HEART RATE: 98 BPM | HEIGHT: 72 IN

## 2018-01-14 VITALS
HEART RATE: 90 BPM | SYSTOLIC BLOOD PRESSURE: 110 MMHG | BODY MASS INDEX: 36.44 KG/M2 | WEIGHT: 269 LBS | OXYGEN SATURATION: 97 % | HEIGHT: 72 IN | DIASTOLIC BLOOD PRESSURE: 70 MMHG | RESPIRATION RATE: 18 BRPM

## 2018-01-14 VITALS
WEIGHT: 273 LBS | HEART RATE: 85 BPM | SYSTOLIC BLOOD PRESSURE: 110 MMHG | HEIGHT: 72 IN | DIASTOLIC BLOOD PRESSURE: 64 MMHG | BODY MASS INDEX: 36.98 KG/M2

## 2018-01-14 NOTE — RESULT NOTES
Verified Results  (1) PT WITH INR 11Aug2016 10:25AM Maddy Conn     Test Name Result Flag Reference   INR 1 7 H 0 8-1 2   Reference interval is for non-anticoagulated patients                   Suggested INR therapeutic range for Vitamin K                 antagonist therapy:                    Standard Dose (moderate intensity                                   therapeutic range):       2 0 - 3 0                    Higher intensity therapeutic range       2 5 - 3 5   Prothrombin Time 17 8 sec H 9 1-12 0

## 2018-01-14 NOTE — RESULT NOTES
Verified Results  (1) PT WITH INR [id number] 08:49AM Millicent Chacon     Test Name Result Flag Reference   INR 1 7 H 0 8-1 2   Reference interval is for non-anticoagulated patients                   Suggested INR therapeutic range for Vitamin K                 antagonist therapy:                    Standard Dose (moderate intensity                                   therapeutic range):       2 0 - 3 0                    Higher intensity therapeutic range       2 5 - 3 5   Prothrombin Time 17 8 sec H 9 1-12 0

## 2018-01-14 NOTE — RESULT NOTES
Verified Results  (1) PT WITH INR 29Jan2016 02:43PM Chrisstacey Sandovalcock     Test Name Result Flag Reference   INR 3 3 H 0 8-1 2   Reference interval is for non-anticoagulated patients                   Suggested INR therapeutic range for Vitamin K                 antagonist therapy:                    Standard Dose (moderate intensity                                   therapeutic range):       2 0 - 3 0                    Higher intensity therapeutic range       2 5 - 3 5   Prothrombin Time 35 7 sec H 9 1-12 0

## 2018-01-14 NOTE — RESULT NOTES
Verified Results  (1) PT WITH INR 23Jun2016 09:33AM Jean Paul Ortiz     Test Name Result Flag Reference   INR 2 7 H 0 8-1 2   Reference interval is for non-anticoagulated patients  Suggested INR therapeutic range for Vitamin K                 antagonist therapy:                    Standard Dose (moderate intensity                                   therapeutic range):       2 0 - 3 0                    Higher intensity therapeutic range       2 5 - 3 5   Prothrombin Time 28 5 sec H 9 1-12 0       Plan  Anticoagulated on warfarin    · (1) PT WITH INR; Status:Active; Requested for:27Yyz3579;    · (1) PT WITH INR; Status:Active; Requested for:35Ylm2341;    · (1) PT WITH INR; Status:Active; Requested for:17Jgp3133;    · (1) PT WITH INR; Status:Active; Requested for:94Lxa2173;    · (1) PT WITH INR; Status:Active; Requested for:35Rpa6380;    · (1) PT WITH INR; Status:Active; Requested for:10Ydb4792;    · (1) PT WITH INR; Status:Active; Requested for:52Lrn2020;    · (1) PT WITH INR; Status:Active; Requested for:51Ycg4642;    · (1) PT WITH INR; Status:Active; Requested NML:64NMV8406;    · (1) PT WITH INR; Status:Active; Requested for:38Way9069;    · (1) PT WITH INR; Status:Active; Requested for:09Tta3629;    · (1) PT WITH INR; Status:Active; Requested for:68Ufb5057;    · (1) PT WITH INR; Status:Complete; Requested for:Recurring Schedule: 6/23/2016;  7/7/2016; 7/21/2016; 8/4/2016; 8/18/2016; 9/1/2016; 9/15/2016; 9/29/2   ;

## 2018-01-14 NOTE — RESULT NOTES
Verified Results  (1) PT WITH INR [id number] 11:40AM Natasha Ramirez     Test Name Result Flag Reference   INR 1 9 H 0 8-1 2   Reference interval is for non-anticoagulated patients                   Suggested INR therapeutic range for Vitamin K                 antagonist therapy:                    Standard Dose (moderate intensity                                   therapeutic range):       2 0 - 3 0                    Higher intensity therapeutic range       2 5 - 3 5   Prothrombin Time 20 1 sec H 9 1-12 0

## 2018-01-15 NOTE — PROGRESS NOTES
Assessment    1  Lumbar herniated disc (722 10) (M51 26)   2  Insomnia (780 52) (G47 00)    Plan  Insomnia    · Zolpidem Tartrate 10 MG Oral Tablet (Ambien); TAKE 1 TABLET AT  BEDTIME AS  NEEDED FOR INSOMNIA  Lumbar herniated disc    · PredniSONE 20 MG Oral Tablet; TAKE 1 TABLET BID X 5 DAYS, THEN 1 TABLET  DAILY X 5 DAYS    Discussion/Summary  Ambien as directed, Prednisone as directed, add Tylenol as directed for pain, max 4Gm of Tylenol/24 hours  consider ortho consult/PT consult if no improvement  call or return for problems/concerns     Chief Complaint  KNEE PAIN, POSS SCIATICA      History of Present Illness  HPI: back issue x 10 years- herniated discs in lumbar spine- on disability  5 nights ago stepped up onto a chair to change the smoke detector  radiation down right leg, past knee into calf     wants refill of his Ambien for insomnia      Review of Systems    Constitutional: no fever or chills, feels well, no tiredness, no recent weight loss or weight gain  ENT: no complaints of earache, no loss of hearing, no nosebleeds or nasal discharge, no sore throat or hoarseness  Cardiovascular: no complaints of slow or fast heart rate, no chest pain, no palpitations, no leg claudication or lower extremity edema  Respiratory: no complaints of shortness of breath, no wheezing or cough, no dyspnea on exertion, no orthopnea or PND  Gastrointestinal: no complaints of abdominal pain, no constipation, no nausea or vomiting, no diarrhea or bloody stools  Genitourinary: no complaints of dysuria or incontinence, no hesitancy, no nocturia, no genital lesion, no inadequacy of penile erection  Musculoskeletal: as noted in HPI  Integumentary: no complaints of skin rash or lesion, no itching or dry skin, no skin wounds  Neurological: no complaints of headache, no confusion, no numbness or tingling, no dizziness or fainting  Active Problems    1  Acute maxillary sinusitis (461 0) (J01 00)   2   Acute non-recurrent sinusitis, unspecified location (461 9) (J01 90)   3  Acute venous embolism and thrombosis of deep vessels of proximal lower extremity   (453 41) (I82 4Y9)   4  Anticoagulated on warfarin (V58 61) (Z79 01)   5  Atrial fibrillation (427 31) (I48 91)   6  Atrial flutter (427 32) (I48 92)   7  Bronchospasm, acute (519 11) (J98 01)   8  Catheter Ablation Atrial Flutter   9  Dyslipidemia (272 4) (E78 5)   10  Encounter for screening colonoscopy (V76 51) (Z12 11)   11  Erectile dysfunction, unspecified erectile dysfunction type (607 84) (N52 9)   12  Factor V Leiden mutation (289 81) (D68 51)   13  Flu vaccine need (V04 81) (Z23)   14  Flu-like symptoms (780 99) (R68 89)   15  Hypertension (401 9) (I10)   16  Need for pneumococcal vaccination (V03 82) (Z23)   17  WILL (obstructive sleep apnea) (327 23) (G47 33)   18  Screening PSA (prostate specific antigen) (V76 44) (Z12 5)    Past Medical History    1  Acute upper respiratory infection (465 9) (J06 9)   2  History of acute sinusitis (V12 69) (Z87 09)   3  History of Paroxysmal atrial fibrillation (427 31) (I48 0)  Active Problems And Past Medical History Reviewed: The active problems and past medical history were reviewed and updated today  Family History  Mother    1  Family history of lung cancer (V16 1) (Z80 1)  Family History Reviewed: The family history was reviewed and updated today  Social History    · Never a smoker  The social history was reviewed and updated today  Surgical History  Surgical History Reviewed: The surgical history was reviewed and updated today  Current Meds   1  Fish Oil CAPS; TAKE 1 CAPSULE Daily Recorded   2  Metoprolol Succinate  MG Oral Tablet Extended Release 24 Hour; TAKE 1   TABLET DAILY; Therapy: 21QGV3170 to (Evaluate:19Jun2016)  Requested for: 86OPV0859; Last   Rx:20May2016 Ordered   3  Oseltamivir Phosphate 75 MG Oral Capsule; TAKE 1 CAPSULE TWICE DAILY;    Therapy: 89JYM8531 to (Evelyn Logsusan)  Requested for: 25Feb2017; Last   Rx:07Uqv4473 Ordered   4  Pravastatin Sodium 40 MG Oral Tablet; Take 1 tablet daily; Therapy: 76YZG5635 to (Evaluate:26Apr2017)  Requested for: 28Oct2016; Last   Rx:28Oct2016 Ordered   5  Tricor 145 MG Oral Tablet; TAKE 1 TABLET DAILY; Therapy: 46LUL7775 to (Evaluate:16Szf7603)  Requested for: 17NSE6215; Last   Rx:58Uho6792 Ordered   6  Warfarin Sodium 5 MG Oral Tablet; 1/2 - 1 tab daily based on PT/INR results; Therapy: 62GPK6175 to (Selena [place])  Requested for: 38QXR5277; Last   Rx:31Pea6878 Ordered    The medication list was reviewed and updated today  Allergies    1  Morphine Derivatives    Vitals   Recorded: 07QZN6851 11:06AM   Heart Rate 75   Systolic 443   Diastolic 82   Height 6 ft    Weight 270 lb    BMI Calculated 36 62   BSA Calculated 2 42   O2 Saturation 96     Physical Exam    Constitutional   General appearance: No acute distress, well appearing and well nourished  Pulmonary   Respiratory effort: No increased work of breathing or signs of respiratory distress  Auscultation of lungs: Clear to auscultation, equal breath sounds bilaterally, no wheezes, no rales, no rhonci  Cardiovascular   Auscultation of heart: Normal rate and rhythm, normal S1 and S2, without murmurs  Musculoskeletal   Gait and station: Normal     Digits and nails: Normal without clubbing or cyanosis  Inspection/palpation of joints, bones, and muscles: Abnormal   right hip and leg pain with ROM "stiffness'  mild tenderness of left lower lumbar region  nontender with b/l straight leg raises     Psychiatric   Orientation to person, place and time: Normal     Mood and affect: Normal          Signatures   Electronically signed by : Griffin Canavan; Mar 10 2017 12:48PM EST                       (Author)    Electronically signed by : Indira Amado MD; Mar 10 2017  3:30PM EST

## 2018-01-15 NOTE — RESULT NOTES
Verified Results  (1) PT WITH INR [id number] 11:23AM Reggy Manual     Test Name Result Flag Reference   INR 2 9 H 0 8-1 2   Reference interval is for non-anticoagulated patients                   Suggested INR therapeutic range for Vitamin K                 antagonist therapy:                    Standard Dose (moderate intensity                                   therapeutic range):       2 0 - 3 0                    Higher intensity therapeutic range       2 5 - 3 5   Prothrombin Time 28 1 sec H 9 1-12 0

## 2018-01-15 NOTE — MISCELLANEOUS
Message   Recorded as Task   Date: 06/02/2017 08:33 AM, Created By: Isaura Skelton   Task Name: Medical Complaint Callback   Assigned To: Isaura Skelton   Regarding Patient: Tameka Hernandez, Status: Active   CommentAnakimberly Turk - 02 Jun 2017 8:33 AM     TASK CREATED  Caller: Self; Medical Complaint; [phone] (Home); [phone] (Work)  C/O Bi-lat knee pain continues  On Warfarin, takes 6-8 tylenol/day with no relief  Wants a medication for pain but not interested in Narcotics  Samples of Pennsaid provided for pt  Task to PM to advise on what med to use  Archie Aceves - 05 Jun 2017 8:11 AM     TASK REPLIED TO: Previously Assigned To Archie Aceves    due to Coumadin, can try Celebrex 20mg daily- still some risk for GI bleeds etc, but less then with other NSAIDs or Tramadol   Henny Gallegos - 05 Jun 2017 8:29 AM     TASK EDITED  pt aware- RX sent to [facility]        Active Problems    1  Anticoagulated on warfarin (V58 61) (Z79 01)   2  Atrial fibrillation (427 31) (I48 91)   3  Atrial flutter (427 32) (I48 92)   4  Catheter Ablation Atrial Flutter   5  Dyslipidemia (272 4) (E78 5)   6  Encounter for screening colonoscopy (V76 51) (Z12 11)   7  Erectile dysfunction, unspecified erectile dysfunction type (607 84) (N52 9)   8  Factor V Leiden mutation (289 81) (D68 51)   9  Hypertension (401 9) (I10)   10  Insomnia (780 52) (G47 00)   11  Lumbar herniated disc (722 10) (M51 26)   12  Need for pneumococcal vaccination (V03 82) (Z23)   13  WILL (obstructive sleep apnea) (327 23) (G47 33)   14  Primary osteoarthritis of both knees (715 16) (M17 0)   15  Screening PSA (prostate specific antigen) (V76 44) (Z12 5)    Current Meds   1  Fenofibrate 145 MG Oral Tablet; TAKE 1 TABLET DAILY; Therapy: 27YWZ7410 to 52-28-62-17)  Requested for: 34OVC3815; Last   Rx:02Jun2017; Status: ACTIVE - Retrospective By Protocol Authorization Ordered   2  Fish Oil CAPS; TAKE 1 CAPSULE Daily Recorded   3   Metoprolol Succinate  MG Oral Tablet Extended Release 24 Hour; TAKE 1   TABLET DAILY; Therapy: 61JCE4608 to (Evaluate:22Nov2017)  Requested for: 62QSX8733; Last   Rx:73Wpt4455 Ordered   4  Pravastatin Sodium 40 MG Oral Tablet (Pravachol); Take 1 tablet daily; Therapy: 18SDO4912 to (Evaluate:22Nov2017)  Requested for: 81FQM4183; Last   Rx:69Vna0330; Status: ACTIVE - Retrospective By Protocol Authorization Ordered   5  Warfarin Sodium 5 MG Oral Tablet; 1/2 - 1 tab daily based on PT/INR results; Therapy: 40NSM1656 to (Evaluate:19Apr2018)  Requested for: 73Nbj1487; Last   Rx:24Apr2017 Ordered   6  Zolpidem Tartrate 10 MG Oral Tablet (Ambien); TAKE 1 TABLET AT  BEDTIME AS   NEEDED FOR INSOMNIA; Therapy: 91BGS9190 to (Evaluate:73Qpq4578); Last Rx:10Mar2017 Ordered    Allergies    1   Morphine Derivatives    Plan  Lumbar herniated disc    · Celecoxib 200 MG Oral Capsule (CeleBREX); take 1 capsule daily    Signatures   Electronically signed by : Allegra Cloud, ; Jun 5 2017  8:30AM EST                       (Author)

## 2018-01-15 NOTE — RESULT NOTES
Verified Results  * XR KNEE 3 VW LEFT NON INJURY 48XRH7723 10:54AM Alyce Corpus Order Number: [id number]     Test Name Result Flag Reference   XR KNEE 3 VW LEFT (Report)     LEFT KNEE     INDICATION: Left knee pain  COMPARISON: None     VIEWS: 3     IMAGES: 3     FINDINGS:     There is no acute fracture or dislocation  There is no joint effusion  There is mild to moderate medial compartment narrowing with marginal osteophyte formation  There are patellofemoral osteophytes  No lytic or blastic lesions are seen  Soft tissues are unremarkable  IMPRESSION:     Osteoarthritis, particularly in the medial compartment  Workstation performed: [id number]     Signed by:   Campos Watson MD   5/2/17     * XR KNEE 3 VW RIGHT NON INJURY 46MSQ4265 10:54AM Alyce Corpus Order Number: [id number]     Test Name Result Flag Reference   XR KNEE 3 VW RIGHT (Report)     RIGHT KNEE     INDICATION: Right knee pain  COMPARISON: None     VIEWS: 3     IMAGES: 3     FINDINGS:     There is no acute fracture or dislocation  There is no joint effusion  There is mild and lateral compartment narrowing  There are tricompartmental marginal osteophytes  There is a fragment at the tibial tubercle which may represent old Osgood-Schlatter disease  No lytic or blastic lesions are seen  Soft tissues are unremarkable  IMPRESSION:     Tricompartmental osteoarthritis with narrowing of the lateral compartment         Workstation performed: [id number]     Signed by:   Campos Watson MD   5/2/17

## 2018-01-15 NOTE — RESULT NOTES
Verified Results  (1) PT WITH INR [id number] 09:17AM Kin Neas     Test Name Result Flag Reference   INR 3 5 H 0 8-1 2   Reference interval is for non-anticoagulated patients                   Suggested INR therapeutic range for Vitamin K                 antagonist therapy:                    Standard Dose (moderate intensity                                   therapeutic range):       2 0 - 3 0                    Higher intensity therapeutic range       2 5 - 3 5   Prothrombin Time 37 2 sec H 9 1-12 0

## 2018-01-16 NOTE — RESULT NOTES
Verified Results  (1) PT WITH INR [id number] 02:21PM Jefferson Avina     Test Name Result Flag Reference   INR 2 4 H 0 8-1 2   Reference interval is for non-anticoagulated patients                   Suggested INR therapeutic range for Vitamin K                 antagonist therapy:                    Standard Dose (moderate intensity                                   therapeutic range):       2 0 - 3 0                    Higher intensity therapeutic range       2 5 - 3 5   Prothrombin Time 23 5 sec H 9 1-12 0

## 2018-01-16 NOTE — RESULT NOTES
Verified Results  (1) PT WITH INR 12Oct2017 08:55AM Elenita Yuen     Test Name Result Flag Reference   INR 3 2 H 0 8-1 2   Reference interval is for non-anticoagulated patients  Suggested INR therapeutic range for Vitamin K                 antagonist therapy:                    Standard Dose (moderate intensity                                   therapeutic range):       2 0 - 3 0                    Higher intensity therapeutic range       2 5 - 3 5   Prothrombin Time 31 1 sec H 9 1-12 0       Plan  Encounter for screening for cardiovascular disorders    · (1) COMPREHENSIVE METABOLIC PANEL; Status:Active; Requested DTD:[id number];    · (1) LIPID PANEL, FASTING; Status:Active;  Requested MZM:[id number];

## 2018-01-16 NOTE — RESULT NOTES
Message  Patient was called and I spoke with his wife in great detail about the results of his sleep study  They are now both aware that he requires an Auto CPAP machine and that the orders along with all the needed supplies were faxed to [facility] today, 9/26/2017  She was also made ware that her  needs to come in for a 90 day compliance check  The patient and his wife understood and were in agreement        Signatures   Electronically signed by : Noel Cotton, ; Sep 26 2017  4:36PM EST                       (Author)

## 2018-01-16 NOTE — RESULT NOTES
Verified Results  (1) PT WITH INR [id number] 09:52AM [facility]     Test Name Result Flag Reference   INR 1 9 H 0 8-1 2   Reference interval is for non-anticoagulated patients                   Suggested INR therapeutic range for Vitamin K                 antagonist therapy:                    Standard Dose (moderate intensity                                   therapeutic range):       2 0 - 3 0                    Higher intensity therapeutic range       2 5 - 3 5   Prothrombin Time 19 4 sec H 9 1-12 0

## 2018-01-16 NOTE — RESULT NOTES
Verified Results  (1) PT WITH INR 21Apr2017 08:41AM Rylee Granados     Test Name Result Flag Reference   INR 1 8 H 0 8-1 2   Reference interval is for non-anticoagulated patients                   Suggested INR therapeutic range for Vitamin K                 antagonist therapy:                    Standard Dose (moderate intensity                                   therapeutic range):       2 0 - 3 0                    Higher intensity therapeutic range       2 5 - 3 5   Prothrombin Time 18 6 sec H 9 1-12 0

## 2018-01-16 NOTE — RESULT NOTES
Verified Results  (1) PT WITH INR 22Jun2017 11:28AM Art Betsey     Test Name Result Flag Reference   INR 2 5 H 0 8-1 2   Reference interval is for non-anticoagulated patients                   Suggested INR therapeutic range for Vitamin K                 antagonist therapy:                    Standard Dose (moderate intensity                                   therapeutic range):       2 0 - 3 0                    Higher intensity therapeutic range       2 5 - 3 5   Prothrombin Time 25 3 sec H 9 1-12 0

## 2018-01-17 NOTE — RESULT NOTES
Verified Results  (1) PT WITH INR [id number] 11:24AM Leopoldo Krystenan     Test Name Result Flag Reference   INR 2 2 H 0 8-1 2   Reference interval is for non-anticoagulated patients                   Suggested INR therapeutic range for Vitamin K                 antagonist therapy:                    Standard Dose (moderate intensity                                   therapeutic range):       2 0 - 3 0                    Higher intensity therapeutic range       2 5 - 3 5   Prothrombin Time 22 9 sec H 9 1-12 0

## 2018-01-17 NOTE — RESULT NOTES
Verified Results  (1) PT WITH INR [id number] 02:54PM Kiel Owen     Test Name Result Flag Reference   INR 3 4 H 0 8-1 2   Reference interval is for non-anticoagulated patients                   Suggested INR therapeutic range for Vitamin K                 antagonist therapy:                    Standard Dose (moderate intensity                                   therapeutic range):       2 0 - 3 0                    Higher intensity therapeutic range       2 5 - 3 5   Prothrombin Time 36 2 sec H 9 1-12 0

## 2018-01-17 NOTE — RESULT NOTES
Verified Results  (1) COMPREHENSIVE METABOLIC PANEL [id number] 98:50ZX [facility]     Test Name Result Flag Reference   Glucose, Serum 102 mg/dL H 65-99   BUN 16 mg/dL  8-27   Creatinine, Serum 1 24 mg/dL  0 76-1 27   BUN/Creatinine Ratio 13  10-24   Sodium, Serum 144 mmol/L  134-144   Potassium, Serum 4 9 mmol/L  3 5-5 2   Chloride, Serum 102 mmol/L     Carbon Dioxide, Total 26 mmol/L  18-29   Calcium, Serum 9 6 mg/dL  8 6-10 2   Protein, Total, Serum 6 9 g/dL  6 0-8 5   Albumin, Serum 4 4 g/dL  3 6-4 8   Globulin, Total 2 5 g/dL  1 5-4 5   A/G Ratio 1 8  1 2-2 2   Bilirubin, Total 0 6 mg/dL  0 0-1 2   Alkaline Phosphatase, S 36 IU/L L    AST (SGOT) 23 IU/L  0-40   ALT (SGPT) 20 IU/L  0-44   eGFR If NonAfricn Am 60 mL/min/1 73  >59   eGFR If Africn Am 70 mL/min/1 73  >59     (1) LIPID PANEL, FASTING 25Oct2017 09:51AM [facility]     Test Name Result Flag Reference   Cholesterol, Total 151 mg/dL  100-199   Triglycerides 94 mg/dL  0-149   HDL Cholesterol 44 mg/dL  >39   VLDL Cholesterol Kermit 19 mg/dL  5-40   LDL Cholesterol Calc 88 mg/dL  0-99   T  Chol/HDL Ratio 3 4 ratio units  0 0-5 0   T  Chol/HDL Ratio                                                             Men  Women                                               1/2 Avg  Risk  3 4    3 3                                                   Avg Risk  5 0    4 4                                                2X Avg  Risk  9 6    7 1                                                3X Avg  Risk 23 4   11 0     (1) PT WITH INR 25Oct2017 09:46AM [facility]     Test Name Result Flag Reference   INR 2 3 H 0 8-1 2   Reference interval is for non-anticoagulated patients                   Suggested INR therapeutic range for Vitamin K                 antagonist therapy:                    Standard Dose (moderate intensity                                   therapeutic range):       2 0 - 3 0                    Higher intensity therapeutic range       2 5 - 3 5 Prothrombin Time 22 9 sec H 9 1-12 0       Plan  Dyslipidemia    · (1) COMPREHENSIVE METABOLIC PANEL ; every 1 week; Last 25Oct2017;  Status:Active   · (LC) Lipid Panel ; every 1 week;  Last [id number]; Status:Active

## 2018-01-17 NOTE — PROGRESS NOTES
Assessment    1  Encounter for preventive health examination (V70 0) (Z00 00)   2  Flu vaccine need (V04 81) (Z23)   3  Encounter for screening colonoscopy (V76 51) (Z12 11)   4  Need for hepatitis C screening test (V73 89) (Z11 59)   5  Screening PSA (prostate specific antigen) (V76 44) (Z12 5)    Plan  Depression, major, single episode, moderate    · DULoxetine HCl - 30 MG Oral Capsule Delayed Release Particles; take 1 capsule  daily  Dyslipidemia    · (LC) Lipid Panel ; every 1 week; Last 81UVD5212; Next 19HFO2587; Status:Active  Encounter for screening colonoscopy    · COLONOSCOPY; Status:Active - Retrospective By Protocol Authorization; Requested  for:30Oct2017;   Flu vaccine need    · Fluzone High-Dose 0 5 ML Intramuscular Suspension Prefilled Syringe  Insomnia    · Zolpidem Tartrate 10 MG Oral Tablet (Ambien); TAKE 1 TABLET AT  BEDTIME  AS NEEDED FOR INSOMNIA  Need for hepatitis C screening test    · (1) HEP C RNA PCR, QUANTITATIVE; Status:Active - Retrospective By Protocol  Authorization; Requested for:30Oct2017;   Screening PSA (prostate specific antigen)    · (1) PSA (SCREEN) (Dx V76 44 Screen for Prostate Cancer); Status:Active - Retrospective  By Protocol Authorization; Requested for:30Oct2017;     Discussion/Summary  health maintenance visit eats an adequate diet Currently, he has an inadequate exercise regimen  Prostate cancer screening: PSA was ordered  Testicular cancer screening: testicular cancer screening is not indicated  Colorectal cancer screening: colonoscopy has been ordered  Screening lab work includes have been completed  The immunizations are up to date  He was advised to be evaluated by an optometrist and a dentist  Advice and education were given regarding aerobic exercise, seat belt use, weight loss and advanced directive planning  Patient discussion: discussed with the patient  The patient was counseled on Hepatitis C screening  The patient agrees to Hepatitis C screening  Discussed Advanced directive  Flu shot today  Due for colonoscopy  Positive depression screen- will accept medication  The treatment plan was reviewed with the patient/guardian  The patient/guardian understands and agrees with the treatment plan      Chief Complaint  annual     Advance Directives  Advance Directive [facility]:   NO - Patient does not have an advance health care directive  History of Present Illness  HM, Adult Male: The patient is being seen for a health maintenance evaluation  Social History: Household members include spouse  He is   Work status: occupation: RETIRED  The patient has never smoked cigarettes  He reports rare alcohol use  General Health: The patient's health since the last visit is described as fair  He has regular dental visits  He complains of vision problems  Vision care includes wearing glasses  He has hearing loss  Immunizations status: up to date  Lifestyle:  He has weight concerns  Screening: Active Problems    1  Anticoagulated on warfarin (V58 61) (Z79 01)   2  Atrial fibrillation (427 31) (I48 91)   3  Atrial flutter (427 32) (I48 92)   4  Catheter Ablation Atrial Flutter   5  Dyslipidemia (272 4) (E78 5)   6  Encounter for screening colonoscopy (V76 51) (Z12 11)   7  Erectile dysfunction, unspecified erectile dysfunction type (607 84) (N52 9)   8  Factor V Leiden mutation (289 81) (D68 51)   9  Hypertension (401 9) (I10)   10  Insomnia (780 52) (G47 00)   11  Lumbar herniated disc (722 10) (M51 26)   12  WILL (obstructive sleep apnea) (327 23) (G47 33)   13  Primary osteoarthritis of both knees (715 16) (M17 0)   14   Screening PSA (prostate specific antigen) (V76 44) (Z12 5)    Past Medical History    · History of Acute maxillary sinusitis (461 0) (J01 00)   · History of Acute non-recurrent sinusitis, unspecified location (461 9) (J01 90)   · Acute upper respiratory infection (465 9) (J06 9)   · History of Acute venous embolism and thrombosis of deep vessels of proximal lower  extremity (453 41) (I82 4Y9)   · History of Bronchospasm, acute (519 11) (J98 01)   · History of Flu-like symptoms (780 99) (R68 89)   · History of acute sinusitis (V12 69) (Z87 09)   · History of Paroxysmal atrial fibrillation (427 31) (I48 0)    Surgical History    · History of Colon Surgery   · History of Lower Back Surgery    Family History  Mother    · Family history of lung cancer (V16 1) (Z80 1)  Family History    · Denied: Family history of substance abuse   · No family history of mental disorder    Social History    · Caffeine use (V49 89) (F15 90)   · 2 cup coff a day   · Never a smoker    Current Meds   1  Celecoxib 200 MG Oral Capsule; take 1 capsule daily; Therapy: 71KPC5621 to (Last Rx:05Jun2017)  Requested for: 10GFA6982 Ordered   2  Fenofibrate 145 MG Oral Tablet; TAKE 1 TABLET DAILY; Therapy: 20OOU1963 to (XBNZHRXF:11FYR5609)  Requested for: 00ZRY8447; Last   Rx:02Jun2017 Ordered   3  Fish Oil CAPS; TAKE 1 CAPSULE Daily Recorded   4  Fluticasone Propionate 50 MCG/ACT Nasal Suspension; USE 2 SPRAYS IN EACH   NOSTRIL ONCE DAILY; Therapy: 16BGN4122 to ([facility])  Requested for: 26Oct2017; Last   TJ:45ZXD0823 Ordered   5  Metoprolol Succinate  MG Oral Tablet Extended Release 24 Hour; Take 1 tablet   by mouth  daily; Therapy: 37FCN1850 to (Evaluate:01Apr2018)  Requested for: 82TUB2194; Last   Rx:03Oct2017 Ordered   6  Pravastatin Sodium 40 MG Oral Tablet; Take 1 tablet by mouth  daily; Therapy: 64EEF3277 to (Evaluate:01Jan2018)  Requested for: 47FTT6355; Last   Rx:03Oct2017; Status: ACTIVE - Retrospective By Protocol Authorization Ordered   7  Warfarin Sodium 5 MG Oral Tablet; 1/2 - 1 tab daily based on PT/INR results; Therapy: 51AOL5269 to [phone])  Requested for: 24Apr2017; Last   Rx:24Apr2017 Ordered   8  Zolpidem Tartrate 10 MG Oral Tablet; TAKE 1 TABLET AT  BEDTIME AS NEEDED FOR   INSOMNIA;    Therapy: 09JBQ2360 to (Evaluate:09Fca7659); Last Rx:10Mar2017 Ordered    Allergies    1  Morphine Derivatives    Vitals   Recorded: 80FPD1604 08:40AM   Heart Rate 88   Respiration 18   Systolic 220   Diastolic 70   Height 6 ft    Weight 274 lb    BMI Calculated 37 16   BSA Calculated 2 44   O2 Saturation 96     Physical Exam    Constitutional   General appearance: No acute distress, well appearing and well nourished  Head and Face   Head and face: Normal     Eyes   Conjunctiva and lids: No erythema, swelling or discharge  Pupils and irises: Equal, round, reactive to light  Ears, Nose, Mouth, and Throat   External inspection of ears and nose: Normal     Lips, teeth, and gums: Normal, good dentition  Oropharynx: Normal with no erythema, edema, exudate or lesions  Neck   Neck: Supple, symmetric, trachea midline, no masses  Thyroid: Normal, no thyromegaly  Pulmonary   Respiratory effort: No increased work of breathing or signs of respiratory distress  Auscultation of lungs: Clear to auscultation  Cardiovascular   Auscultation of heart: Abnormal   The rhythm was irregularly irregular  no murmurs were heard  Abdominal aorta: Normal     Pedal pulses: 2+ bilaterally  Examination of extremities for edema and/or varicosities: Abnormal   mild edema  Psychiatric   Orientation to person, place and time: Normal     Mood and affect: Normal        Results/Data  PHQ-9 Adult Depression Screening 30Oct2017 09:21AM User, s     Test Name Result Flag Reference   PHQ-9 Adult Depression Score 14     Over the last two weeks, how often have you been bothered by any of the following problems?   Little interest or pleasure in doing things: More than half the days - 2  Feeling down, depressed, or hopeless: Several days - 1  Trouble falling or staying asleep, or sleeping too much: Several days - 1  Feeling tired or having little energy: More than half the days - 2  Poor appetite or over eating: More than half the days - 2  Feeling bad about yourself - or that you are a failure or have let yourself or your family down: More than half the days - 2  Trouble concentrating on things, such as reading the newspaper or watching television: More than half the days - 2  Moving or speaking so slowly that other people could have noticed  Or the opposite -  being so fidgety or restless that you have been moving around a lot more than usual: More than half the days - 2  Thoughts that you would be better off dead, or of hurting yourself in some way: Not at all - 0   PHQ-9 Adult Depression Screening Positive     PHQ-9 Difficulty Level Somewhat difficult     PHQ-9 Severity Moderate Depression       PHQ-2 Adult Depression Screening 30Oct2017 09:18AM User, Ahs     Test Name Result Flag Reference   PHQ-2 Adult Depression Score 2     Over the last two weeks, how often have you been bothered by any of the following problems? Little interest or pleasure in doing things: Several days - 1  Feeling down, depressed, or hopeless: Several days - 1   PHQ-2 Adult Depression Screening Negative         Health Management  Dyslipidemia   (1) COMPREHENSIVE METABOLIC PANEL; every 1 week; Last 17JWJ3276; Next Due:  52VGO6445; Active  (LC) Lipid Panel; every 1 week; Last 01PLB6906; Next Due: 52ABG7592; Active  Encounter for screening colonoscopy   COLONOSCOPY; every 5 years; Last 19RCE7982; Next Due: [id number]; Overdue  Screening PSA (prostate specific antigen)   (1) PSA (SCREEN) (Dx V76 44 Screen for Prostate Cancer); every 1 week; Next Due:  60OBM5878; Overdue    Future Appointments    Date/Time Provider Specialty Site   12/11/2017 01:40 PM Rubi Hess DO Pulmonary Medicine [facility]   11/03/2017 11:00 AM MARY Nunes   Cardiology [facility]   12/11/2017 08:30 AM Ivy Arboleda MD Family Medicine [address]     Signatures   Electronically signed by : Louis Brooks MD; Oct 30 2017  9:29AM EST (Author)

## 2018-01-17 NOTE — RESULT NOTES
Verified Results  (1) PT WITH INR [id number] 03:13PM Rachel Dejesus     Test Name Result Flag Reference   INR 1 7 H 0 8-1 2   Reference interval is for non-anticoagulated patients                   Suggested INR therapeutic range for Vitamin K                 antagonist therapy:                    Standard Dose (moderate intensity                                   therapeutic range):       2 0 - 3 0                    Higher intensity therapeutic range       2 5 - 3 5   Prothrombin Time 18 6 sec H 9 1-12 0

## 2018-01-17 NOTE — RESULT NOTES
Verified Results  (1) PT WITH INR [id number] 11:18AM Kristen Sam     Test Name Result Flag Reference   INR 1 6 H 0 8-1 2   Reference interval is for non-anticoagulated patients                   Suggested INR therapeutic range for Vitamin K                 antagonist therapy:                    Standard Dose (moderate intensity                                   therapeutic range):       2 0 - 3 0                    Higher intensity therapeutic range       2 5 - 3 5   Prothrombin Time 17 1 sec H 9 1-12 0

## 2018-01-18 NOTE — RESULT NOTES
Verified Results  (1) PT WITH INR [id number] 09:07AM Sven Leonardo     Test Name Result Flag Reference   INR 3 1 H 0 8-1 2   Reference interval is for non-anticoagulated patients                   Suggested INR therapeutic range for Vitamin K                 antagonist therapy:                    Standard Dose (moderate intensity                                   therapeutic range):       2 0 - 3 0                    Higher intensity therapeutic range       2 5 - 3 5   Prothrombin Time 33 2 sec H 9 1-12 0

## 2018-01-18 NOTE — RESULT NOTES
Verified Results  (1) PT WITH INR [id number] 10:27AM Leopoldo Loan     Test Name Result Flag Reference   INR 1 9 H 0 8-1 2   Reference interval is for non-anticoagulated patients                   Suggested INR therapeutic range for Vitamin K                 antagonist therapy:                    Standard Dose (moderate intensity                                   therapeutic range):       2 0 - 3 0                    Higher intensity therapeutic range       2 5 - 3 5   Prothrombin Time 19 1 sec H 9 1-12 0

## 2018-01-22 VITALS
SYSTOLIC BLOOD PRESSURE: 120 MMHG | DIASTOLIC BLOOD PRESSURE: 70 MMHG | WEIGHT: 274 LBS | BODY MASS INDEX: 37.11 KG/M2 | OXYGEN SATURATION: 96 % | RESPIRATION RATE: 18 BRPM | HEART RATE: 88 BPM | HEIGHT: 72 IN

## 2018-01-22 VITALS
HEIGHT: 72 IN | DIASTOLIC BLOOD PRESSURE: 80 MMHG | RESPIRATION RATE: 16 BRPM | BODY MASS INDEX: 37.52 KG/M2 | WEIGHT: 277 LBS | TEMPERATURE: 97.6 F | OXYGEN SATURATION: 95 % | HEART RATE: 83 BPM | SYSTOLIC BLOOD PRESSURE: 118 MMHG

## 2018-01-22 VITALS
SYSTOLIC BLOOD PRESSURE: 124 MMHG | OXYGEN SATURATION: 96 % | DIASTOLIC BLOOD PRESSURE: 82 MMHG | HEART RATE: 75 BPM | BODY MASS INDEX: 36.57 KG/M2 | WEIGHT: 270 LBS | HEIGHT: 72 IN

## 2018-01-23 NOTE — RESULT NOTES
Verified Results  (1) PT WITH INR [id number] 11:11AM Reggy Manual     Test Name Result Flag Reference   INR 3 4 H 0 8-1 2   Reference interval is for non-anticoagulated patients                   Suggested INR therapeutic range for Vitamin K                 antagonist therapy:                    Standard Dose (moderate intensity                                   therapeutic range):       2 0 - 3 0                    Higher intensity therapeutic range       2 5 - 3 5   Prothrombin Time 32 4 sec H 9 1-12 0

## 2018-01-24 VITALS
DIASTOLIC BLOOD PRESSURE: 78 MMHG | BODY MASS INDEX: 32.05 KG/M2 | HEART RATE: 88 BPM | SYSTOLIC BLOOD PRESSURE: 116 MMHG | WEIGHT: 277 LBS | RESPIRATION RATE: 18 BRPM | HEIGHT: 78 IN

## 2018-01-26 LAB
INR PPP: 2.1 (ref 0.8–1.2)
PROTHROMBIN TIME: 20.9 SEC (ref 9.1–12)

## 2018-01-29 DIAGNOSIS — G47.00 INSOMNIA, UNSPECIFIED TYPE: Primary | ICD-10-CM

## 2018-01-29 RX ORDER — ZOLPIDEM TARTRATE 10 MG/1
1 TABLET ORAL
COMMUNITY
Start: 2017-03-10 | End: 2018-01-29 | Stop reason: SDUPTHER

## 2018-01-29 RX ORDER — ZOLPIDEM TARTRATE 10 MG/1
10 TABLET ORAL
Qty: 30 TABLET | Refills: 2 | Status: SHIPPED | OUTPATIENT
Start: 2018-01-29 | End: 2018-06-04 | Stop reason: SDUPTHER

## 2018-02-14 ENCOUNTER — ANTICOAG VISIT (OUTPATIENT)
Dept: FAMILY MEDICINE CLINIC | Facility: CLINIC | Age: 68
End: 2018-02-14

## 2018-02-14 DIAGNOSIS — Z79.01 LONG TERM CURRENT USE OF ANTICOAGULANT THERAPY: Primary | ICD-10-CM

## 2018-02-15 DIAGNOSIS — Z79.01 LONG TERM CURRENT USE OF ANTICOAGULANT: ICD-10-CM

## 2018-02-15 DIAGNOSIS — Z51.81 ANTICOAGULATION MANAGEMENT ENCOUNTER: Primary | ICD-10-CM

## 2018-02-15 DIAGNOSIS — Z79.01 ANTICOAGULATION MANAGEMENT ENCOUNTER: Primary | ICD-10-CM

## 2018-02-17 LAB
INR PPP: 2.4 (ref 0.8–1.2)
PROTHROMBIN TIME: 23.7 SEC (ref 9.1–12)

## 2018-02-19 ENCOUNTER — ANTICOAG VISIT (OUTPATIENT)
Dept: FAMILY MEDICINE CLINIC | Facility: CLINIC | Age: 68
End: 2018-02-19

## 2018-02-26 NOTE — RESULT NOTES
Verified Results  (1) PT WITH INR [id number] 02:05PM Bety Snell     Test Name Result Flag Reference   INR 2 2 H 0 8-1 2   Reference interval is for non-anticoagulated patients                   Suggested INR therapeutic range for Vitamin K                 antagonist therapy:                    Standard Dose (moderate intensity                                   therapeutic range):       2 0 - 3 0                    Higher intensity therapeutic range       2 5 - 3 5   Prothrombin Time 22 2 sec H 9 1-12 0

## 2018-03-06 DIAGNOSIS — E78.5 DYSLIPIDEMIA: Primary | ICD-10-CM

## 2018-03-06 RX ORDER — PRAVASTATIN SODIUM 40 MG
1 TABLET ORAL DAILY
COMMUNITY
Start: 2016-02-09 | End: 2018-03-06 | Stop reason: SDUPTHER

## 2018-03-06 RX ORDER — FENOFIBRATE 145 MG/1
1 TABLET, COATED ORAL DAILY
COMMUNITY
Start: 2011-11-03 | End: 2018-03-06 | Stop reason: SDUPTHER

## 2018-03-08 DIAGNOSIS — F32.A DEPRESSION, UNSPECIFIED DEPRESSION TYPE: Primary | ICD-10-CM

## 2018-03-08 RX ORDER — PRAVASTATIN SODIUM 40 MG
40 TABLET ORAL DAILY
Qty: 90 TABLET | Refills: 1 | Status: SHIPPED | OUTPATIENT
Start: 2018-03-08 | End: 2018-10-07 | Stop reason: SDUPTHER

## 2018-03-08 RX ORDER — DULOXETIN HYDROCHLORIDE 60 MG/1
CAPSULE, DELAYED RELEASE ORAL
Qty: 30 CAPSULE | Refills: 2 | Status: SHIPPED | OUTPATIENT
Start: 2018-03-08 | End: 2018-05-21 | Stop reason: SDUPTHER

## 2018-03-08 RX ORDER — FENOFIBRATE 145 MG/1
145 TABLET, COATED ORAL DAILY
Qty: 90 TABLET | Refills: 1 | Status: SHIPPED | OUTPATIENT
Start: 2018-03-08 | End: 2018-05-30 | Stop reason: SDUPTHER

## 2018-03-09 ENCOUNTER — ANTICOAG VISIT (OUTPATIENT)
Dept: FAMILY MEDICINE CLINIC | Facility: CLINIC | Age: 68
End: 2018-03-09

## 2018-03-09 LAB
INR PPP: 2.7 (ref 0.8–1.2)
PROTHROMBIN TIME: 26.7 SEC (ref 9.1–12)

## 2018-03-23 ENCOUNTER — ANTICOAG VISIT (OUTPATIENT)
Dept: FAMILY MEDICINE CLINIC | Facility: CLINIC | Age: 68
End: 2018-03-23

## 2018-03-23 LAB
INR PPP: 2.6 (ref 0.8–1.2)
PROTHROMBIN TIME: 25.8 SEC (ref 9.1–12)

## 2018-03-29 ENCOUNTER — OFFICE VISIT (OUTPATIENT)
Dept: CARDIOLOGY CLINIC | Facility: CLINIC | Age: 68
End: 2018-03-29
Payer: COMMERCIAL

## 2018-03-29 VITALS
SYSTOLIC BLOOD PRESSURE: 110 MMHG | DIASTOLIC BLOOD PRESSURE: 60 MMHG | BODY MASS INDEX: 37.65 KG/M2 | HEART RATE: 86 BPM | HEIGHT: 72 IN | WEIGHT: 278 LBS

## 2018-03-29 DIAGNOSIS — I48.0 PAROXYSMAL ATRIAL FIBRILLATION (HCC): ICD-10-CM

## 2018-03-29 DIAGNOSIS — I48.92 ATRIAL FLUTTER, UNSPECIFIED TYPE (HCC): Primary | ICD-10-CM

## 2018-03-29 DIAGNOSIS — D68.51 FACTOR V LEIDEN MUTATION (HCC): ICD-10-CM

## 2018-03-29 DIAGNOSIS — Z98.890 STATUS POST CATHETER ABLATION OF ATRIAL FLUTTER: ICD-10-CM

## 2018-03-29 DIAGNOSIS — I10 ESSENTIAL HYPERTENSION: ICD-10-CM

## 2018-03-29 DIAGNOSIS — G47.33 OSA (OBSTRUCTIVE SLEEP APNEA): ICD-10-CM

## 2018-03-29 DIAGNOSIS — E78.5 DYSLIPIDEMIA: ICD-10-CM

## 2018-03-29 PROCEDURE — 99214 OFFICE O/P EST MOD 30 MIN: CPT | Performed by: INTERNAL MEDICINE

## 2018-03-29 PROCEDURE — 93000 ELECTROCARDIOGRAM COMPLETE: CPT | Performed by: INTERNAL MEDICINE

## 2018-03-29 RX ORDER — CELECOXIB 200 MG/1
1 CAPSULE ORAL DAILY
COMMUNITY
Start: 2017-06-05 | End: 2018-05-29 | Stop reason: SDUPTHER

## 2018-03-29 RX ORDER — FLUTICASONE PROPIONATE 50 MCG
2 SPRAY, SUSPENSION (ML) NASAL DAILY
COMMUNITY
Start: 2017-10-25 | End: 2018-09-11 | Stop reason: ALTCHOICE

## 2018-03-29 RX ORDER — WARFARIN SODIUM 5 MG/1
1 TABLET ORAL DAILY
COMMUNITY
Start: 2011-11-03 | End: 2018-05-30 | Stop reason: SDUPTHER

## 2018-03-29 RX ORDER — METOPROLOL SUCCINATE 100 MG/1
1 TABLET, EXTENDED RELEASE ORAL DAILY
COMMUNITY
Start: 2012-02-06 | End: 2018-04-06 | Stop reason: SDUPTHER

## 2018-03-29 NOTE — PROGRESS NOTES
Cardiology Follow Up    Jhonathan Tay  1950  [id number]  HEART & VASCULAR  [facility] Chava Hickey  [address]  [address] [address] [address]    1  Atrial flutter, unspecified type ([facility] [address] )  POCT ECG   2  Paroxysmal atrial fibrillation (HCC)     3  Status post catheter ablation of atrial flutter     4  Factor V Leiden mutation ([facility] )     5  WILL (obstructive sleep apnea)     6  Essential hypertension     7  Dyslipidemia         Interval History:     Cristiana Crespo returns for followup given his history of paroxysmal atrial fibrillation  This was diagnosed at the time of a colonoscopy, performed by Dr Burt Vicente a few years ago  After starting metoprolol, in follow-up he was back to sinus rhythm  Then in January 2015 Cristiana Crespo arrived in atrial flutter with 2:1 conduction  He however has no symptoms other than is ongoing fatigue  At that time I set him up for a cardioversion that next week  He converted without any problems  He then met Dr Naina Wilhelm of our EP service  He then came back a week later and had his atrial flutter ablated  This went successfully  He was in sinus rhythm up until a few appointments ago in which he was found to be back in atrial fibrillation, with controlled rates  He has been on Coumadin for years given his history of a DVT and being found to have factor 5 Leiden mutation-homozygous  When we 1st met him, he also went for a sleep study, and was found to have severe obstructive sleep apnea  He however did not tolerate the CPAP mask due to claustrophobia  I encouraged him to seek consultation with sleep medicine, and for a while he did not do this  However after our last appointment he did do this  He then had proper mask fitting, and now is on CPAP  He has been [facility] on this [facility] me he feels about the same as last visit  He continues to have fatigue at times   He denies any palpitations, lightheadedness or syncope he denies any chest pain or worsening shortness of breath  Certainly he has shortness of breath with exertion particularly upper levels of exertion, which has not changed  He denies any lightheadedness or syncope  He denies any symptoms of congestive heart failure  He tells me that since starting CPAP overall he does feel like he has more energy  He stopped the simvastatin due to side effects but he tolerated the pravastatin  No past medical history on file  Social History     Social History    Marital status: /Civil Union     Spouse name: N/A    Number of children: N/A    Years of education: N/A     Occupational History    Not on file  Social History Main Topics    Smoking status: Not on file    Smokeless tobacco: Not on file    Alcohol use Not on file    Drug use: Unknown    Sexual activity: Not on file     Other Topics Concern    Not on file     Social History Narrative    No narrative on file      Family History   Problem Relation Age of Onset    No Known Problems Mother     No Known Problems Father     Heart attack Brother     Atrial fibrillation Brother      No past surgical history on file      Current Outpatient Prescriptions:     celecoxib (CeleBREX) 200 mg capsule, Take 1 capsule by mouth daily, Disp: , Rfl:     DULoxetine (CYMBALTA) 60 mg delayed release capsule, TAKE 1 CAPSULE DAILY, Disp: 30 capsule, Rfl: 2    fenofibrate (TRICOR) 145 mg tablet, Take 1 tablet (145 mg total) by mouth daily, Disp: 90 tablet, Rfl: 1    fluticasone (FLONASE) 50 mcg/act nasal spray, 2 sprays into each nostril daily, Disp: , Rfl:     metoprolol succinate (TOPROL-XL) 100 mg 24 hr tablet, Take 1 tablet by mouth daily, Disp: , Rfl:     pravastatin (PRAVACHOL) 40 mg tablet, Take 1 tablet (40 mg total) by mouth daily, Disp: 90 tablet, Rfl: 1    warfarin (COUMADIN) 5 mg tablet, Take 1 tablet by mouth daily, Disp: , Rfl:     zolpidem (AMBIEN) 10 mg tablet, Take 1 tablet (10 mg total) by mouth daily at bedtime as needed for sleep, Disp: 30 tablet, Rfl: 2  No Known Allergies    Labs:  Lab Results   Component Value Date     10/25/2017    K 4 9 10/25/2017     10/25/2017    CO2 26 10/25/2017    BUN 16 10/25/2017    CREATININE 1 24 10/25/2017    GLUCOSE 102 (H) 10/25/2017    CALCIUM 9 6 10/25/2017     Lab Results   Component Value Date    WBC 8 43 01/23/2015    HGB 12 9 01/23/2015    HCT 39 5 01/23/2015    MCV 93 01/23/2015     01/23/2015     Lab Results   Component Value Date    CHOL 151 10/25/2017    TRIG 94 10/25/2017    HDL 44 10/25/2017     Imaging:   ECG obtained today demonstrates atrial fibrillation with nonspecific ST and T-wave changes  Review of Systems:  Review of Systems   Constitutional: Positive for fatigue  HENT: Negative  Eyes: Negative  Respiratory: Positive for shortness of breath  Cardiovascular: Negative  Gastrointestinal: Negative  Musculoskeletal: Negative  Skin: Negative  Allergic/Immunologic: Negative  Neurological: Negative  Hematological: Negative  Psychiatric/Behavioral: Negative  All other systems reviewed and are negative  Physical Exam:  Physical Exam   Constitutional: He is oriented to person, place, and time  He appears well-developed and well-nourished  HENT:   Head: Normocephalic and atraumatic  Eyes: EOM are normal  Pupils are equal, round, and reactive to light  Right eye exhibits no discharge  Left eye exhibits no discharge  No scleral icterus  Neck: Normal range of motion  Neck supple  No JVD present  No thyromegaly present  Cardiovascular: Normal rate, S1 normal, S2 normal, normal heart sounds, intact distal pulses and normal pulses  An irregularly irregular rhythm present  Exam reveals no gallop and no friction rub  No murmur heard  Pulmonary/Chest: Effort normal and breath sounds normal  No respiratory distress  He has no wheezes  He has no rales  Abdominal: Soft   Bowel sounds are normal  He exhibits no distension  There is no tenderness  Musculoskeletal: Normal range of motion  He exhibits no edema, tenderness or deformity  Neurological: He is alert and oriented to person, place, and time  No cranial nerve deficit  Skin: Skin is warm and dry  No rash noted  Psychiatric: He has a normal mood and affect  Judgment and thought content normal    Nursing note and vitals reviewed  Discussion/Summary:    1  CHOLO Overton  Remains in atrial fibrillation  His heart rates are controlled  He is minimally symptomatic  However, still gets some fatigue and shortness of breath  Now that he has been compliant on CPAP, we are going to try another rhythm control strategy to see if this makes him feel even better  We will also get an updated echocardiogram   We will set him up for an elective cardioversion in the near future  He remains on Coumadin for stroke prevention given this and his history of factor V Leiden mutation  2  Obstructive sleep apnea -  He is now on an tolerating CPAP  I have encouraged him to remain compliant on this  3   Hypertension -  Well controlled on his current regimen  No changes were made  He should periodically check his blood pressure at home  4   Dyslipidemia -  Tolerating pravastatin  No changes were made  He will continue to have blood work followed by his primary care physician  Counseling / Coordination of Care  Total floor / unit time spent today 25 minutes  Greater than 50% of total time was spent with the patient and / or family counseling and / or coordination of care

## 2018-04-03 ENCOUNTER — HOSPITAL ENCOUNTER (OUTPATIENT)
Dept: NON INVASIVE DIAGNOSTICS | Facility: CLINIC | Age: 68
Discharge: HOME | End: 2018-04-03
Payer: COMMERCIAL

## 2018-04-03 DIAGNOSIS — I48.0 PAROXYSMAL ATRIAL FIBRILLATION (HCC): ICD-10-CM

## 2018-04-03 PROCEDURE — 93306 TTE W/DOPPLER COMPLETE: CPT

## 2018-04-03 PROCEDURE — 93306 TTE W/DOPPLER COMPLETE: CPT | Performed by: INTERNAL MEDICINE

## 2018-04-06 DIAGNOSIS — I48.91 ATRIAL FIBRILLATION, UNSPECIFIED TYPE (HCC): Primary | ICD-10-CM

## 2018-04-09 RX ORDER — METOPROLOL SUCCINATE 100 MG/1
TABLET, EXTENDED RELEASE ORAL DAILY
Qty: 90 TABLET | Refills: 1 | Status: SHIPPED | OUTPATIENT
Start: 2018-04-09 | End: 2018-10-07 | Stop reason: SDUPTHER

## 2018-04-11 ENCOUNTER — ANESTHESIA (OUTPATIENT)
Dept: NON INVASIVE DIAGNOSTICS | Facility: HOSPITAL | Age: 68
End: 2018-04-11

## 2018-04-11 ENCOUNTER — HOSPITAL ENCOUNTER (OUTPATIENT)
Dept: NON INVASIVE DIAGNOSTICS | Facility: HOSPITAL | Age: 68
Discharge: HOME | End: 2018-04-11
Attending: INTERNAL MEDICINE
Payer: COMMERCIAL

## 2018-04-11 ENCOUNTER — ANESTHESIA EVENT (OUTPATIENT)
Dept: NON INVASIVE DIAGNOSTICS | Facility: HOSPITAL | Age: 68
End: 2018-04-11

## 2018-04-11 VITALS
RESPIRATION RATE: 16 BRPM | OXYGEN SATURATION: 97 % | DIASTOLIC BLOOD PRESSURE: 77 MMHG | TEMPERATURE: 97.8 F | SYSTOLIC BLOOD PRESSURE: 128 MMHG | HEART RATE: 59 BPM

## 2018-04-11 DIAGNOSIS — I48.0 PAROXYSMAL ATRIAL FIBRILLATION (HCC): ICD-10-CM

## 2018-04-11 LAB
ATRIAL RATE: 375 BPM
ATRIAL RATE: 59 BPM
P AXIS: 56 DEGREES
PR INTERVAL: 202 MS
QRS AXIS: 82 DEGREES
QRS AXIS: 91 DEGREES
QRSD INTERVAL: 130 MS
QRSD INTERVAL: 134 MS
QT INTERVAL: 396 MS
QT INTERVAL: 446 MS
QTC INTERVAL: 441 MS
QTC INTERVAL: 465 MS
T WAVE AXIS: -34 DEGREES
T WAVE AXIS: 11 DEGREES
VENTRICULAR RATE: 59 BPM
VENTRICULAR RATE: 83 BPM

## 2018-04-11 PROCEDURE — 92960 CARDIOVERSION ELECTRIC EXT: CPT

## 2018-04-11 PROCEDURE — 92960 CARDIOVERSION ELECTRIC EXT: CPT | Performed by: INTERNAL MEDICINE

## 2018-04-11 PROCEDURE — 93010 ELECTROCARDIOGRAM REPORT: CPT | Performed by: INTERNAL MEDICINE

## 2018-04-11 PROCEDURE — 93005 ELECTROCARDIOGRAM TRACING: CPT | Performed by: INTERNAL MEDICINE

## 2018-04-11 RX ORDER — PROPOFOL 10 MG/ML
INJECTION, EMULSION INTRAVENOUS AS NEEDED
Status: DISCONTINUED | OUTPATIENT
Start: 2018-04-11 | End: 2018-04-11 | Stop reason: SURG

## 2018-04-11 RX ORDER — SODIUM CHLORIDE 9 MG/ML
INJECTION, SOLUTION INTRAVENOUS CONTINUOUS PRN
Status: DISCONTINUED | OUTPATIENT
Start: 2018-04-11 | End: 2018-04-11 | Stop reason: SURG

## 2018-04-11 RX ADMIN — PROPOFOL 90 MG: 10 INJECTION, EMULSION INTRAVENOUS at 13:30

## 2018-04-11 RX ADMIN — SODIUM CHLORIDE: 0.9 INJECTION, SOLUTION INTRAVENOUS at 13:06

## 2018-04-11 NOTE — ANESTHESIA PREPROCEDURE EVALUATION
Review of Systems/Medical History  Patient summary reviewed        Cardiovascular  Negative cardio ROS Exercise tolerance: good,  Hyperlipidemia, Hypertension controlled, Dysrhythmias, atrial fibrillation and atrial flutter,    Pulmonary  Negative pulmonary ROS Sleep apnea CPAP and Sleep Study completed,        GI/Hepatic  Negative GI/hepatic ROS          Negative  ROS        Endo/Other  Negative endo/other ROS   Obesity    GYN  Negative gynecology ROS          Hematology  Negative hematology ROS      Musculoskeletal  Negative musculoskeletal ROS Osteoarthritis,        Neurology  Negative neurology ROS      Psychology   Negative psychology ROS              Physical Exam    Airway    Mallampati score: II  TM Distance: >3 FB  Neck ROM: full     Dental   upper dentures,     Cardiovascular  Comment: Negative ROS, Rhythm: regular, Rate: normal, Cardiovascular exam normal    Pulmonary  Pulmonary exam normal Breath sounds clear to auscultation,     Other Findings        Anesthesia Plan  ASA Score- 3     Anesthesia Type- IV sedation with anesthesia with ASA Monitors  Additional Monitors:   Airway Plan:         Plan Factors-    Induction- intravenous  Postoperative Plan-     Informed Consent- Anesthetic plan and risks discussed with patient  I personally reviewed this patient with the CRNA  Discussed and agreed on the Anesthesia Plan with the CRNA  Rasheeda Burrows

## 2018-04-11 NOTE — NURSING NOTE
Patient for CV monitors applied, consents obtained and labs reviewed  Cardioverted with 150 synchronized joules x1 to NSR   APU called for patient return, transport to APU with anesthesia

## 2018-04-13 ENCOUNTER — ANTICOAG VISIT (OUTPATIENT)
Dept: FAMILY MEDICINE CLINIC | Facility: CLINIC | Age: 68
End: 2018-04-13

## 2018-04-13 ENCOUNTER — TELEPHONE (OUTPATIENT)
Dept: FAMILY MEDICINE CLINIC | Facility: CLINIC | Age: 68
End: 2018-04-13

## 2018-04-13 LAB
INR PPP: 2.1 (ref 0.8–1.2)
PROTHROMBIN TIME: 20.7 SEC (ref 9.1–12)

## 2018-04-26 ENCOUNTER — TELEPHONE (OUTPATIENT)
Dept: CARDIOLOGY CLINIC | Facility: CLINIC | Age: 68
End: 2018-04-26

## 2018-04-26 NOTE — TELEPHONE ENCOUNTER
Patient called with a billing question  Provided him with the Central billing number [phone]  Also patient stated he has a nurse visit scheduled for 5/3/18 at 10:00 am however, the schedule shows 9:00 am   Patient said that was good because he had to take his wife to an appointment  He thought he would have to change the time or day of his nurse visit, EKG  Patient asked if he was able to get an earlier appointment  Patient was instructed that he was able to come in 15 mins prior to his appointment time

## 2018-04-27 LAB
INR PPP: 2.2 (ref 0.8–1.2)
PROTHROMBIN TIME: 21.3 SEC (ref 9.1–12)

## 2018-04-28 ENCOUNTER — ANTICOAG VISIT (OUTPATIENT)
Dept: FAMILY MEDICINE CLINIC | Facility: CLINIC | Age: 68
End: 2018-04-28

## 2018-04-28 ENCOUNTER — TELEPHONE (OUTPATIENT)
Dept: FAMILY MEDICINE CLINIC | Facility: CLINIC | Age: 68
End: 2018-04-28

## 2018-05-03 ENCOUNTER — CLINICAL SUPPORT (OUTPATIENT)
Dept: CARDIOLOGY CLINIC | Facility: CLINIC | Age: 68
End: 2018-05-03
Payer: COMMERCIAL

## 2018-05-03 VITALS — DIASTOLIC BLOOD PRESSURE: 80 MMHG | SYSTOLIC BLOOD PRESSURE: 110 MMHG | HEART RATE: 89 BPM

## 2018-05-03 DIAGNOSIS — I48.0 PAROXYSMAL ATRIAL FIBRILLATION (HCC): Primary | ICD-10-CM

## 2018-05-03 PROCEDURE — 99211 OFF/OP EST MAY X REQ PHY/QHP: CPT

## 2018-05-03 PROCEDURE — 93000 ELECTROCARDIOGRAM COMPLETE: CPT

## 2018-05-03 NOTE — PROGRESS NOTES
Pt  Came into for post CV EKG  EKG shown to Rishi Booker and pt  Is in AFIB today  Dr Simone Booker spoke with pt and no new Tx initiated today    SEE EKG

## 2018-05-11 ENCOUNTER — ANTICOAG VISIT (OUTPATIENT)
Dept: FAMILY MEDICINE CLINIC | Facility: CLINIC | Age: 68
End: 2018-05-11

## 2018-05-11 LAB
INR PPP: 2.1 (ref 0.8–1.2)
PROTHROMBIN TIME: 21.2 SEC (ref 9.1–12)

## 2018-05-21 DIAGNOSIS — F32.A DEPRESSION, UNSPECIFIED DEPRESSION TYPE: ICD-10-CM

## 2018-05-21 RX ORDER — DULOXETIN HYDROCHLORIDE 60 MG/1
60 CAPSULE, DELAYED RELEASE ORAL DAILY
Qty: 90 CAPSULE | Refills: 0 | Status: SHIPPED | OUTPATIENT
Start: 2018-05-21 | End: 2018-06-19 | Stop reason: SDUPTHER

## 2018-05-25 ENCOUNTER — ANTICOAG VISIT (OUTPATIENT)
Dept: FAMILY MEDICINE CLINIC | Facility: CLINIC | Age: 68
End: 2018-05-25

## 2018-05-25 LAB
INR PPP: 2.3 (ref 0.8–1.2)
PROTHROMBIN TIME: 22.5 SEC (ref 9.1–12)

## 2018-05-29 ENCOUNTER — ANTICOAG VISIT (OUTPATIENT)
Dept: FAMILY MEDICINE CLINIC | Facility: CLINIC | Age: 68
End: 2018-05-29

## 2018-05-29 DIAGNOSIS — M51.26 LUMBAR HERNIATED DISC: Primary | ICD-10-CM

## 2018-05-29 DIAGNOSIS — E78.5 DYSLIPIDEMIA: ICD-10-CM

## 2018-05-29 DIAGNOSIS — D68.51 FACTOR V LEIDEN MUTATION (HCC): Primary | ICD-10-CM

## 2018-05-29 RX ORDER — CELECOXIB 200 MG/1
200 CAPSULE ORAL DAILY
Refills: 0 | Status: CANCELLED | OUTPATIENT
Start: 2018-05-29

## 2018-05-30 RX ORDER — CELECOXIB 200 MG/1
CAPSULE ORAL
Qty: 90 CAPSULE | Refills: 1 | Status: SHIPPED | OUTPATIENT
Start: 2018-05-30 | End: 2018-11-20 | Stop reason: SDUPTHER

## 2018-05-30 RX ORDER — FENOFIBRATE 145 MG/1
TABLET, COATED ORAL
Qty: 90 TABLET | Refills: 0 | Status: SHIPPED | OUTPATIENT
Start: 2018-05-30 | End: 2018-06-04 | Stop reason: ALTCHOICE

## 2018-05-30 RX ORDER — WARFARIN SODIUM 5 MG/1
TABLET ORAL
Qty: 90 TABLET | Refills: 1 | Status: SHIPPED | OUTPATIENT
Start: 2018-05-30 | End: 2019-05-21 | Stop reason: SDUPTHER

## 2018-06-04 ENCOUNTER — OFFICE VISIT (OUTPATIENT)
Dept: FAMILY MEDICINE CLINIC | Facility: CLINIC | Age: 68
End: 2018-06-04
Payer: COMMERCIAL

## 2018-06-04 VITALS
RESPIRATION RATE: 16 BRPM | WEIGHT: 277 LBS | BODY MASS INDEX: 37.52 KG/M2 | DIASTOLIC BLOOD PRESSURE: 82 MMHG | HEIGHT: 72 IN | OXYGEN SATURATION: 95 % | SYSTOLIC BLOOD PRESSURE: 120 MMHG | HEART RATE: 92 BPM

## 2018-06-04 DIAGNOSIS — G47.33 OSA (OBSTRUCTIVE SLEEP APNEA): ICD-10-CM

## 2018-06-04 DIAGNOSIS — F32.1 DEPRESSION, MAJOR, SINGLE EPISODE, MODERATE (HCC): ICD-10-CM

## 2018-06-04 DIAGNOSIS — F51.01 PRIMARY INSOMNIA: ICD-10-CM

## 2018-06-04 DIAGNOSIS — G47.00 INSOMNIA, UNSPECIFIED TYPE: ICD-10-CM

## 2018-06-04 DIAGNOSIS — E78.5 DYSLIPIDEMIA: Primary | ICD-10-CM

## 2018-06-04 DIAGNOSIS — I10 ESSENTIAL HYPERTENSION: ICD-10-CM

## 2018-06-04 DIAGNOSIS — N52.9 ERECTILE DYSFUNCTION, UNSPECIFIED ERECTILE DYSFUNCTION TYPE: ICD-10-CM

## 2018-06-04 PROBLEM — M51.26 LUMBAR HERNIATED DISC: Status: ACTIVE | Noted: 2017-03-10

## 2018-06-04 PROBLEM — M17.0 PRIMARY OSTEOARTHRITIS OF BOTH KNEES: Status: ACTIVE | Noted: 2017-04-24

## 2018-06-04 PROCEDURE — 1036F TOBACCO NON-USER: CPT | Performed by: FAMILY MEDICINE

## 2018-06-04 PROCEDURE — 99214 OFFICE O/P EST MOD 30 MIN: CPT | Performed by: FAMILY MEDICINE

## 2018-06-04 RX ORDER — SILDENAFIL 100 MG/1
100 TABLET, FILM COATED ORAL DAILY PRN
Qty: 5 TABLET | Refills: 1 | Status: SHIPPED | OUTPATIENT
Start: 2018-06-04 | End: 2018-09-11 | Stop reason: ALTCHOICE

## 2018-06-04 RX ORDER — ZOLPIDEM TARTRATE 10 MG/1
10 TABLET ORAL
Qty: 30 TABLET | Refills: 1 | Status: SHIPPED | OUTPATIENT
Start: 2018-06-04 | End: 2018-06-05 | Stop reason: SDUPTHER

## 2018-06-04 NOTE — PROGRESS NOTES
[id number]         NAME: Alva Steiner is a 80 y o  male  : 1950    MRN: [id number]  DATE: 2018  TIME: 1:29 PM    Assessment and Plan   Dyslipidemia [E78 5]  1  Dyslipidemia     2  Primary insomnia     3  Depression, major, single episode, moderate ([facility] [address] )     4  WILL (obstructive sleep apnea)     5  Essential hypertension     6  Insomnia, unspecified type  zolpidem (AMBIEN) 10 mg tablet   7  Erectile dysfunction, unspecified erectile dysfunction type  sildenafil (VIAGRA) 100 mg tablet         Patient Instructions     Patient Instructions   Refill on Ambien  Continue other medications  I would advise we give a trial off the fenofibrate as not to combine it with the statin  I would have you stop this and repeat laboratories in 3 months  You will need to call so we can added to 1 of your PT/INRs sometime in early September  Trial of Viagra  mg as needed for ED  Chief Complaint     Chief Complaint   Patient presents with    Follow-up     mm - f/u    History of Present Illness       Medical management-  1) hypertension-stable with current medications  Patient does not have any palpitations although he is in and out of atrial fib   2) atrial fib flutter-has had 2 ablation is in the past   Continues to see Edward Pump  Does take chronic Coumadin for the AFib flutter and his factor 5 deficiency  3) hyperlipidemia-has been on dual therapy with fenofibrate and a statin  He is not aware of any very high triglycerides in the past   We did discuss recommendation of may be not having this combination going forward  4) depression-this is stable with the Cymbalta  Cymbalta tell also helping on his back pain  5) insomnia-Ambien still effective does not taken nightly  This appears to be use about 2 times per week  Review of Systems   Review of Systems   Constitutional: Negative for activity change, appetite change, diaphoresis and fatigue  Respiratory: Negative for cough, chest tightness, shortness of breath and wheezing  Cardiovascular: Negative for chest pain, palpitations and leg swelling  Fast or slow heart rate   Gastrointestinal: Negative for abdominal pain, blood in stool, constipation, diarrhea, nausea and vomiting  Genitourinary: Negative for difficulty urinating, dysuria and frequency  Musculoskeletal: Positive for arthralgias  Negative for gait problem  Neurological: Negative for dizziness, light-headedness, numbness and headaches  Psychiatric/Behavioral: Negative for agitation, confusion, dysphoric mood and sleep disturbance  The patient is nervous/anxious            Current Medications       Current Outpatient Prescriptions:     celecoxib (CeleBREX) 200 mg capsule, TAKE 1 CAPSULE DAILY, Disp: 90 capsule, Rfl: 1    DULoxetine (CYMBALTA) 60 mg delayed release capsule, Take 1 capsule (60 mg total) by mouth daily, Disp: 90 capsule, Rfl: 0    fluticasone (FLONASE) 50 mcg/act nasal spray, 2 sprays into each nostril daily, Disp: , Rfl:     metoprolol succinate (TOPROL-XL) 100 mg 24 hr tablet, TAKE 1 TABLET BY MOUTH  DAILY, Disp: 90 tablet, Rfl: 1    pravastatin (PRAVACHOL) 40 mg tablet, Take 1 tablet (40 mg total) by mouth daily, Disp: 90 tablet, Rfl: 1    sildenafil (VIAGRA) 100 mg tablet, Take 1 tablet (100 mg total) by mouth daily as needed for erectile dysfunction, Disp: 5 tablet, Rfl: 1    warfarin (COUMADIN) 5 mg tablet, TAKE 1/2 TO 1 TABLET BY  MOUTH DAILY BASED ON PT/INR RESULTS, Disp: 90 tablet, Rfl: 1    zolpidem (AMBIEN) 10 mg tablet, Take 1 tablet (10 mg total) by mouth daily at bedtime as needed for sleep, Disp: 30 tablet, Rfl: 1    Current Allergies     Allergies as of 06/04/2018    (No Known Allergies)            The following portions of the patient's history were reviewed and updated as appropriate: allergies, current medications, past family history, past medical history, past social history, past surgical history and problem list      Past Medical History:   Diagnosis Date    Acute venous embolism and thrombosis of deep vessels of proximal lower extremity (HCC)     Last assessed: 5/18/15    Paroxysmal atrial fibrillation (HCC)     Last assessed: 11/2/15       Past Surgical History:   Procedure Laterality Date    BACK SURGERY      Lower    COLON SURGERY         Family History   Problem Relation Age of Onset    Lung cancer Mother     No Known Problems Father     Heart attack Brother     Atrial fibrillation Brother          Medications have been verified  Objective   /82 (BP Location: Left arm, Patient Position: Sitting, Cuff Size: Large)   Pulse 92   Resp 16   Ht 6' (1 829 m)   Wt 126 kg (277 lb)   SpO2 95%   BMI 37 57 kg/m²        Physical Exam     Physical Exam   Constitutional: He is oriented to person, place, and time  He appears well-developed and well-nourished  No distress  HENT:   Head: Normocephalic and atraumatic  Right Ear: Tympanic membrane, external ear and ear canal normal    Left Ear: Tympanic membrane, external ear and ear canal normal    Nose: No mucosal edema or rhinorrhea  Right sinus exhibits no maxillary sinus tenderness  Left sinus exhibits no maxillary sinus tenderness  Mouth/Throat: Uvula is midline  Mucous membranes are not pale and not dry  Normal dentition  No oropharyngeal exudate  Eyes: EOM are normal  Pupils are equal, round, and reactive to light  Right eye exhibits no discharge  Left eye exhibits no discharge  Neck: Normal range of motion  Neck supple  No thyromegaly present  Cardiovascular: Normal rate, regular rhythm, normal heart sounds and intact distal pulses  No murmur heard  Pulmonary/Chest: Effort normal and breath sounds normal  No respiratory distress  He has no wheezes  He has no rales  Musculoskeletal: Normal range of motion  He exhibits no edema or tenderness  Lymphadenopathy:     He has no cervical adenopathy  Neurological: He is alert and oriented to person, place, and time  No cranial nerve deficit  Skin: He is not diaphoretic  Psychiatric: He has a normal mood and affect   His behavior is normal

## 2018-06-04 NOTE — PATIENT INSTRUCTIONS
Refill on Ambien  Continue other medications  I would advise we give a trial off the fenofibrate as not to combine it with the statin  I would have you stop this and repeat laboratories in 3 months  You will need to call so we can added to 1 of your PT/INRs sometime in early September  Trial of Viagra  mg as needed for ED

## 2018-06-05 ENCOUNTER — TELEPHONE (OUTPATIENT)
Dept: CARDIOLOGY CLINIC | Facility: CLINIC | Age: 68
End: 2018-06-05

## 2018-06-05 DIAGNOSIS — G47.00 INSOMNIA, UNSPECIFIED TYPE: ICD-10-CM

## 2018-06-05 RX ORDER — ZOLPIDEM TARTRATE 10 MG/1
TABLET ORAL
Qty: 30 TABLET | Refills: 2 | Status: SHIPPED | OUTPATIENT
Start: 2018-06-05 | End: 2018-11-29 | Stop reason: SDUPTHER

## 2018-06-06 ENCOUNTER — TELEPHONE (OUTPATIENT)
Dept: FAMILY MEDICINE CLINIC | Facility: CLINIC | Age: 68
End: 2018-06-06

## 2018-06-06 DIAGNOSIS — Z79.01 ANTICOAGULATED ON COUMADIN: Primary | ICD-10-CM

## 2018-06-06 NOTE — TELEPHONE ENCOUNTER
Handwritten note on my desk that patient needs new PT/INR standing order to [facility]    Order placed for q2wks for 30 times over 12 months

## 2018-06-08 ENCOUNTER — ANTICOAG VISIT (OUTPATIENT)
Dept: FAMILY MEDICINE CLINIC | Facility: CLINIC | Age: 68
End: 2018-06-08

## 2018-06-08 LAB
INR PPP: 2.1 (ref 0.8–1.2)
PROTHROMBIN TIME: 21.1 SEC (ref 9.1–12)

## 2018-06-19 DIAGNOSIS — F32.A DEPRESSION, UNSPECIFIED DEPRESSION TYPE: ICD-10-CM

## 2018-06-19 RX ORDER — DULOXETIN HYDROCHLORIDE 60 MG/1
60 CAPSULE, DELAYED RELEASE ORAL DAILY
Qty: 90 CAPSULE | Refills: 1 | Status: SHIPPED | OUTPATIENT
Start: 2018-06-19 | End: 2018-11-20 | Stop reason: SDUPTHER

## 2018-06-29 ENCOUNTER — ANTICOAG VISIT (OUTPATIENT)
Dept: FAMILY MEDICINE CLINIC | Facility: CLINIC | Age: 68
End: 2018-06-29

## 2018-07-13 ENCOUNTER — ANTICOAG VISIT (OUTPATIENT)
Dept: FAMILY MEDICINE CLINIC | Facility: CLINIC | Age: 68
End: 2018-07-13

## 2018-07-27 ENCOUNTER — ANTICOAG VISIT (OUTPATIENT)
Dept: FAMILY MEDICINE CLINIC | Facility: CLINIC | Age: 68
End: 2018-07-27

## 2018-08-31 ENCOUNTER — ANTICOAG VISIT (OUTPATIENT)
Dept: FAMILY MEDICINE CLINIC | Facility: CLINIC | Age: 68
End: 2018-08-31

## 2018-09-11 ENCOUNTER — OFFICE VISIT (OUTPATIENT)
Dept: CARDIOLOGY CLINIC | Facility: CLINIC | Age: 68
End: 2018-09-11
Payer: COMMERCIAL

## 2018-09-11 VITALS
WEIGHT: 258.4 LBS | HEIGHT: 72 IN | BODY MASS INDEX: 35 KG/M2 | SYSTOLIC BLOOD PRESSURE: 128 MMHG | DIASTOLIC BLOOD PRESSURE: 82 MMHG | HEART RATE: 71 BPM

## 2018-09-11 DIAGNOSIS — Z98.890 STATUS POST CATHETER ABLATION OF ATRIAL FLUTTER: ICD-10-CM

## 2018-09-11 DIAGNOSIS — I48.19 PERSISTENT ATRIAL FIBRILLATION (HCC): Primary | ICD-10-CM

## 2018-09-11 DIAGNOSIS — D68.51 FACTOR V LEIDEN MUTATION (HCC): ICD-10-CM

## 2018-09-11 DIAGNOSIS — E78.5 DYSLIPIDEMIA: ICD-10-CM

## 2018-09-11 DIAGNOSIS — I48.3 TYPICAL ATRIAL FLUTTER (HCC): ICD-10-CM

## 2018-09-11 DIAGNOSIS — G47.33 OSA (OBSTRUCTIVE SLEEP APNEA): ICD-10-CM

## 2018-09-11 PROCEDURE — 99214 OFFICE O/P EST MOD 30 MIN: CPT | Performed by: INTERNAL MEDICINE

## 2018-09-11 PROCEDURE — 93000 ELECTROCARDIOGRAM COMPLETE: CPT | Performed by: INTERNAL MEDICINE

## 2018-09-11 NOTE — PATIENT INSTRUCTIONS
A-fib (Atrial Fibrillation)   AMBULATORY CARE:   Atrial fibrillation (a-fib)  is an irregular heartbeat  It reduces your heart's ability to pump blood through your body  A-fib may come and go, or it may be a long-term condition  A-fib can cause life-threatening blood clots, stroke, or heart failure  It is important to treat and manage a-fib to help prevent these problems  Common signs and symptoms include the following:   · A heartbeat that races, pounds, or flutters    · Weakness, severe tiredness, or confusion    · Feeling lightheaded, sweaty, dizzy, or faint    · Shortness of breath or anxiety    · Chest pain or pressure  Call 911 for any of the following:   · You have any of the following signs of a heart attack:      ¨ Squeezing, pressure, or pain in your chest that lasts longer than 5 minutes or returns    ¨ Discomfort or pain in your back, neck, jaw, stomach, or arm     ¨ Trouble breathing    ¨ Nausea or vomiting    ¨ Lightheadedness or a sudden cold sweat, especially with chest pain or trouble breathing    · You have any of the following signs of a stroke:      ¨ Numbness or drooping on one side of your face     ¨ Weakness in an arm or leg    ¨ Confusion or difficulty speaking    ¨ Dizziness, a severe headache, or vision loss  Seek care immediately if:  You have any of the following signs of a blood clot:  · You feel lightheaded, are short of breath, and have chest pain  · You cough up blood  · You have swelling, redness, pain, or warmth in your arm or leg  Contact your cardiologist or healthcare provider if:   · Your target heart rate is not in the range it should be  · You have new or worsening swelling in your legs, feet, ankles, or abdomen  · You are short of breath, even at rest      · You have questions or concerns about your condition or care  Treatment for A-fib:  Conditions that cause a-fib, such as thyroid disease, will be treated   You may also need any of the following:  · Heart medicines  help control your heart rate and rhythm  You may need more than one medicine to treat your symptoms  · Antiplatelet and blood thinner medicines  help prevent blood clots  · Cardioversion  is a procedure to return your heart rate and rhythm to normal  It can be done using medicines or electric shock  · A-fib ablation  is a procedure that uses energy to burn a small area of heart tissue  This creates scar tissue and prevents electrical signals that cause a-fib  You may need this procedure more than once  Ask for more information on a-fib ablation  · A pacemaker  may be inserted into your heart  A pacemaker is a device that controls your heartbeat  A pacemaker may be inserted during an ablation procedure or surgery  Ask your healthcare provider for more information on pacemakers  · Surgery  may be needed if other procedures do not work  During surgery your healthcare provider will make cuts in the upper part of your heart  The provider will stitch the cuts together to create scar tissue  The scar tissue will prevent electrical signals that cause a-fib  Manage A-fib:   · Know your target heart rate  Learn how to take your pulse and monitor your heart rate  · Manage other health conditions  This includes high blood pressure, sleep apnea, thyroid disease, diabetes, and other heart conditions  Take medicine as directed and follow your treatment plan  · Limit or do not drink alcohol  Alcohol can make a-fib hard to manage  Ask your healthcare provider if it is safe for you to drink alcohol  A drink of alcohol is 12 ounces of beer, 5 ounces of wine, or 1½ ounces of liquor  · Do not smoke  Nicotine and other chemicals in cigarettes and cigars can cause heart and lung damage  Ask your healthcare provider for information if you currently smoke and need help to quit  E-cigarettes or smokeless tobacco still contain nicotine   Talk to your healthcare provider before you use these products  · Eat heart-healthy foods  Heart healthy foods will help keep your cholesterol low  These include fruits, vegetables, whole-grain breads, low-fat dairy products, beans, lean meats, and fish  Replace butter and margarine with heart-healthy oils such as olive oil and canola oil  · Maintain a healthy weight  Ask your healthcare provider how much you should weigh  Ask him to help you create a weight loss plan if you are overweight  · Exercise for 30 minutes  most days of the week  Ask your healthcare provider about the best exercise plan for you  Follow up with your cardiologist as directed: You will need regular blood tests and monitoring  Write down your questions so you remember to ask them during your visits  © 2017 [employer] Information is for End User's use only and may not be sold, redistributed or otherwise used for commercial purposes  All illustrations and images included in CareNotes® are the copyrighted property of [employer]  or [employer]ia Kauffman  The above information is an  only  It is not intended as medical advice for individual conditions or treatments  Talk to your doctor, nurse or pharmacist before following any medical regimen to see if it is safe and effective for you

## 2018-09-11 NOTE — PROGRESS NOTES
Cardiology Follow Up    Darren Beasley  1950  [id number]  HEART & VASCULAR  [facility] Dalila Calderon  [address]  [address] [address] [address]    1  Persistent atrial fibrillation (HCC)  POCT ECG   2  Typical atrial flutter (HCC)     3  Status post catheter ablation of atrial flutter     4  Dyslipidemia     5  WILL (obstructive sleep apnea)     6  Factor V Leiden mutation ([address] )         Interval History:     Gelacio Thomas returns for followup given his history of paroxysmal atrial fibrillation  This was diagnosed at the time of a colonoscopy, performed by Dr Gray Oviedo a few years ago  After starting metoprolol, in follow-up he was back to sinus rhythm  Then in January 2015 Gelacio Thomas arrived in atrial flutter with 2:1 conduction  He however has no symptoms other than is ongoing fatigue  At that time I set him up for a cardioversion that next week  He converted without any problems  He then met Dr Chastity Salcedo of our EP service  He then came back a week later and had his atrial flutter ablated  He was in sinus rhythm up until a few appointments ago in which he was found to be back in atrial fibrillation, with controlled rates  He has been on Coumadin for years given his history of a DVT and being found to have factor 5 Leiden mutation-homozygous  When we 1st met him, he also went for a sleep study, and was found to have severe obstructive sleep apnea  He however did not tolerate the CPAP mask due to claustrophobia  I encouraged him to seek consultation with sleep medicine, and for a while he did not do this  However after last year he did do this  He then had proper mask fitting, and now is on CPAP  He has been compliant on this since  At our last visit Gelacio Thomas remained in atrial fibrillation, and did complain of some fatigue and some shortness of breath / diaphoresis on exertion  He overall felt well, but we discussed a rhythm control strategy    He we set him up for a cardioversion which was only transiently successful  Within the month he was back in atrial fibrillation and persists today  Josh Doss tells me he feels about the same as last visit  He appears to be minimally symptomatic from an atrial fibrillation standpoint, but does have fatigue and shortness of breath with upper levels of exertion  It is difficult to tell if he has any different in sinus rhythm  However, his wife tells me that she thinks she sees a difference in him over the last year and that he appears a bit more short of breath doing things  He denies any lightheadedness or syncope  He denies any symptoms of congestive heart failure  He tells me that since starting CPAP overall he does feel like he has more energy  He stopped the simvastatin due to side effects but he tolerated the pravastatin  Past Medical History:   Diagnosis Date    Acute venous embolism and thrombosis of deep vessels of proximal lower extremity (HCC)     Last assessed: 5/18/15    Paroxysmal atrial fibrillation (HCC)     Last assessed: 11/2/15     Social History     Social History    Marital status: /Civil Union     Spouse name: N/A    Number of children: N/A    Years of education: N/A     Occupational History    Not on file       Social History Main Topics    Smoking status: Never Smoker    Smokeless tobacco: Never Used    Alcohol use No    Drug use: No    Sexual activity: Not on file     Other Topics Concern    Not on file     Social History Narrative    Caffeine use: 2 cups coffee a day      Family History   Problem Relation Age of Onset    Lung cancer Mother     No Known Problems Father     Heart attack Brother     Atrial fibrillation Brother      Past Surgical History:   Procedure Laterality Date    BACK SURGERY      Lower    COLON SURGERY         Current Outpatient Prescriptions:     celecoxib (CeleBREX) 200 mg capsule, TAKE 1 CAPSULE DAILY, Disp: 90 capsule, Rfl: 1    DULoxetine (CYMBALTA) 60 mg delayed release capsule, Take 1 capsule (60 mg total) by mouth daily, Disp: 90 capsule, Rfl: 1    metoprolol succinate (TOPROL-XL) 100 mg 24 hr tablet, TAKE 1 TABLET BY MOUTH  DAILY, Disp: 90 tablet, Rfl: 1    pravastatin (PRAVACHOL) 40 mg tablet, Take 1 tablet (40 mg total) by mouth daily, Disp: 90 tablet, Rfl: 1    warfarin (COUMADIN) 5 mg tablet, TAKE 1/2 TO 1 TABLET BY  MOUTH DAILY BASED ON PT/INR RESULTS, Disp: 90 tablet, Rfl: 1    zolpidem (AMBIEN) 10 mg tablet, TAKE ONE TABLET BY MOUTH AT BEDTIME AS NEEDED FOR SLEEP, Disp: 30 tablet, Rfl: 2  Allergies   Allergen Reactions    Morphine Nausea Only       Labs:  Lab Results   Component Value Date     10/25/2017    K 4 9 10/25/2017     10/25/2017    CO2 26 10/25/2017    BUN 16 10/25/2017    CREATININE 1 24 10/25/2017    GLUCOSE 102 (H) 10/25/2017    CALCIUM 9 6 10/25/2017     Lab Results   Component Value Date    WBC 8 43 01/23/2015    HGB 12 9 01/23/2015    HCT 39 5 01/23/2015    MCV 93 01/23/2015     01/23/2015     Lab Results   Component Value Date    CHOL 151 10/25/2017    TRIG 94 10/25/2017    HDL 44 10/25/2017     Imaging:   ECG obtained today demonstrates atrial fibrillation with nonspecific ST and T-wave changes  Review of Systems:  Review of Systems   Constitutional: Positive for fatigue  HENT: Negative  Eyes: Negative  Respiratory: Positive for shortness of breath  Cardiovascular: Negative  Gastrointestinal: Negative  Musculoskeletal: Negative  Skin: Negative  Allergic/Immunologic: Negative  Neurological: Negative  Hematological: Negative  Psychiatric/Behavioral: Negative  All other systems reviewed and are negative  Physical Exam:  Physical Exam   Constitutional: He is oriented to person, place, and time  He appears well-developed and well-nourished  HENT:   Head: Normocephalic and atraumatic  Eyes: EOM are normal  Pupils are equal, round, and reactive to light   Right eye exhibits no discharge  Left eye exhibits no discharge  No scleral icterus  Neck: Normal range of motion  Neck supple  No JVD present  No thyromegaly present  Cardiovascular: Normal rate, S1 normal, S2 normal, normal heart sounds, intact distal pulses and normal pulses  An irregularly irregular rhythm present  Exam reveals no gallop and no friction rub  No murmur heard  Pulmonary/Chest: Effort normal and breath sounds normal  No respiratory distress  He has no wheezes  He has no rales  Abdominal: Soft  Bowel sounds are normal  He exhibits no distension  There is no tenderness  Musculoskeletal: Normal range of motion  He exhibits no edema, tenderness or deformity  Neurological: He is alert and oriented to person, place, and time  No cranial nerve deficit  Skin: Skin is warm and dry  No rash noted  Psychiatric: He has a normal mood and affect  Judgment and thought content normal    Nursing note and vitals reviewed  Discussion/Summary:    1  PAF - Kashif Mcclendon remains in atrial fibrillation  He had a failed cardioversion earlier this year  I had a long discussion with him and his wife regarding his options  Since he is relatively asymptomatic, we did discuss a rate control strategy  However at his age, along with not knowing the long-term ramifications of chronic atrial fibrillation and him, we did discuss an entertain rhythm control strategies  This would include anti rhythmic therapy and/or evaluation for ablation  Since he was never on anti rhythmic therapy, I suggested this 1st although it is not unreasonable to go back to Dr Lalit Quarles to discuss ablation at least   He wanted to think about these options  In the meantime we are getting an updated stress nuclear study, in case we are going to start class 1 C antiarrhythmic  2  Obstructive sleep apnea -  He is now on an tolerating CPAP  I have encouraged him to remain compliant on this  3   Hypertension -  Well controlled on his current regimen    No changes were made  He should periodically check his blood pressure at home  4   Dyslipidemia -  Tolerating pravastatin  No changes were made  He will continue to have blood work followed by his primary care physician  Counseling / Coordination of Care  Total floor / unit time spent today 30 minutes  Greater than 50% of total time was spent with the patient and / or family counseling and / or coordination of care

## 2018-09-13 ENCOUNTER — OFFICE VISIT (OUTPATIENT)
Dept: FAMILY MEDICINE CLINIC | Facility: CLINIC | Age: 68
End: 2018-09-13
Payer: COMMERCIAL

## 2018-09-13 VITALS
SYSTOLIC BLOOD PRESSURE: 110 MMHG | HEIGHT: 72 IN | HEART RATE: 90 BPM | BODY MASS INDEX: 34.95 KG/M2 | RESPIRATION RATE: 16 BRPM | WEIGHT: 258 LBS | DIASTOLIC BLOOD PRESSURE: 80 MMHG | OXYGEN SATURATION: 98 %

## 2018-09-13 DIAGNOSIS — Z00.00 ENCOUNTER FOR WELLNESS EXAMINATION IN ADULT: ICD-10-CM

## 2018-09-13 DIAGNOSIS — Z11.59 ENCOUNTER FOR HEPATITIS C SCREENING TEST FOR LOW RISK PATIENT: ICD-10-CM

## 2018-09-13 DIAGNOSIS — Z23 NEED FOR VACCINATION: Primary | ICD-10-CM

## 2018-09-13 DIAGNOSIS — F51.01 PRIMARY INSOMNIA: ICD-10-CM

## 2018-09-13 DIAGNOSIS — N52.9 ERECTILE DYSFUNCTION, UNSPECIFIED ERECTILE DYSFUNCTION TYPE: ICD-10-CM

## 2018-09-13 DIAGNOSIS — F32.1 DEPRESSION, MAJOR, SINGLE EPISODE, MODERATE (HCC): ICD-10-CM

## 2018-09-13 DIAGNOSIS — Z12.11 SCREENING FOR COLON CANCER: ICD-10-CM

## 2018-09-13 DIAGNOSIS — M17.0 PRIMARY OSTEOARTHRITIS OF BOTH KNEES: ICD-10-CM

## 2018-09-13 DIAGNOSIS — E78.5 DYSLIPIDEMIA: ICD-10-CM

## 2018-09-13 DIAGNOSIS — I10 ESSENTIAL HYPERTENSION: ICD-10-CM

## 2018-09-13 DIAGNOSIS — D68.51 FACTOR V LEIDEN MUTATION (HCC): ICD-10-CM

## 2018-09-13 PROCEDURE — 3079F DIAST BP 80-89 MM HG: CPT | Performed by: FAMILY MEDICINE

## 2018-09-13 PROCEDURE — 90662 IIV NO PRSV INCREASED AG IM: CPT

## 2018-09-13 PROCEDURE — 1160F RVW MEDS BY RX/DR IN RCRD: CPT | Performed by: FAMILY MEDICINE

## 2018-09-13 PROCEDURE — 99397 PER PM REEVAL EST PAT 65+ YR: CPT | Performed by: FAMILY MEDICINE

## 2018-09-13 PROCEDURE — 99214 OFFICE O/P EST MOD 30 MIN: CPT | Performed by: FAMILY MEDICINE

## 2018-09-13 PROCEDURE — G0008 ADMIN INFLUENZA VIRUS VAC: HCPCS

## 2018-09-13 PROCEDURE — 3008F BODY MASS INDEX DOCD: CPT | Performed by: FAMILY MEDICINE

## 2018-09-13 PROCEDURE — 3074F SYST BP LT 130 MM HG: CPT | Performed by: FAMILY MEDICINE

## 2018-09-13 PROCEDURE — 1160F RVW MEDS BY RX/DR IN RCRD: CPT

## 2018-09-13 RX ORDER — SILDENAFIL 100 MG/1
100 TABLET, FILM COATED ORAL DAILY PRN
Qty: 10 TABLET | Refills: 3 | Status: SHIPPED | OUTPATIENT
Start: 2018-09-13 | End: 2020-06-02 | Stop reason: ALTCHOICE

## 2018-09-13 NOTE — PATIENT INSTRUCTIONS
Health maintenance-you are due a colonoscopy please call Dr Danny Salazar to schedule  Flu shot today  We did discuss shingles vaccine, you have declined  This would be be done at the pharmacy if you change of mind  Metabolic screens are current  Depression screen negative  Advanced Directive in place  Discussed hepatitis-C  Patient agrees the be screen with next labs  Discussed PSA screening-you have declined  Medical management-consider 2nd opinion on the knee surgery  Continue current medications  Will check labs in October to include CMP, lipid panel, and hepatitis C antibody  Continue warfarin with PT/INR monitoring-we can stretch this out to 3 weeks  Get your stress test with the cardiologist   Flu shot today

## 2018-09-13 NOTE — PROGRESS NOTES
Assessment/Plan:    Primary osteoarthritis of both knees  Had seen Ortho in the past to advise possible injections and physical therapy  Patient had not shows to this at the time  Feels that eventually he will end up getting an knee replacement, did not wish to waste time when this was the eventual outcome  Patient will seek 2nd orthopedic opinion  Essential hypertension  Good control current medications  No cardiac symptoms  Will continue same meds  Factor V Leiden mutation ([facility] [address] )  Continues on lifetime anticoagulation with warfarin  Dyslipidemia  Levels are adequate, continue with current medications  Recheck in 1 year  Depression, major, single episode, moderate ([facility] [address] )  Patient doing well on current medications  Will continue same recheck 6 months  Diagnoses and all orders for this visit:    Need for vaccination  -     influenza vaccine, 3897-4265, high-dose, PF 0 5 mL, for patients 65 yr+ (FLUZONE HIGH-DOSE)    Screening for colon cancer  -     Ambulatory referral to Gastroenterology; Future    Encounter for wellness examination in adult    Encounter for hepatitis C screening test for low risk patient  -     Hepatitis C antibody; Future  -     Hepatitis C antibody    Primary osteoarthritis of both knees    Factor V Leiden mutation (HCC)    Dyslipidemia  -     Comprehensive metabolic panel; Future  -     Lipid panel; Future  -     Comprehensive metabolic panel  -     Lipid panel    Depression, major, single episode, moderate (HCC)    Primary insomnia    Erectile dysfunction, unspecified erectile dysfunction type  -     sildenafil (VIAGRA) 100 mg tablet; Take 1 tablet (100 mg total) by mouth daily as needed for erectile dysfunction    Essential hypertension        Medical management-consider 2nd opinion on the knee surgery  Continue current medications  Will check labs in October to include CMP, lipid panel, and hepatitis C antibody    Continue warfarin with PT/INR monitoring-we can stretch this out to 3 weeks  Get your stress test with the cardiologist   Flu shot today  Subjective:   Chief Complaint   Patient presents with    Follow-up     3m f/u    Physical Exam             Patient ID: Betsy Lopez is a 80 y o  male  Medical management-  1) arthritis both knees-patient continues to take Celebrex on a as needed basis due to cost   Did see 1 orthopedist who wish to do nonsurgical treatments prior to any knee replacements  Patient is still interested in possible knee replacement  Did not get 2nd orthopedic opinion  2) factor 5 deficiency on chronic Coumadin  3) dyslipidemia-due for testing in October  4) depression with some sleep disturbance  Continues to be on Cymbalta and Ambien for sleep  The following portions of the patient's history were reviewed and updated as appropriate: allergies, current medications, past family history, past medical history, past social history, past surgical history and problem list     Review of Systems   Constitutional: Positive for fatigue  Negative for activity change, appetite change, diaphoresis and fever  Respiratory: Negative for cough, chest tightness, shortness of breath and wheezing  Cardiovascular: Positive for leg swelling  Negative for chest pain and palpitations  Fast or slow heart rate   Gastrointestinal: Negative for abdominal pain, blood in stool, constipation, diarrhea, nausea and vomiting  Genitourinary: Negative for difficulty urinating, dysuria and frequency  Musculoskeletal: Positive for arthralgias, back pain and joint swelling  Negative for gait problem and myalgias  Neurological: Negative for dizziness, light-headedness, numbness and headaches  Psychiatric/Behavioral: Negative for agitation, confusion, dysphoric mood and sleep disturbance  The patient is not nervous/anxious            Objective:  Vitals:    09/13/18 0907   BP: 110/80   BP Location: Left arm   Patient Position: Sitting   Cuff Size: Large   Pulse: 90   Resp: 16   SpO2: 98%   Weight: 117 kg (258 lb)   Height: 6' (1 829 m)      Physical Exam   Constitutional: He is oriented to person, place, and time  He appears well-developed and well-nourished  No distress  HENT:   Head: Normocephalic and atraumatic  Right Ear: Tympanic membrane, external ear and ear canal normal    Left Ear: Tympanic membrane, external ear and ear canal normal    Nose: No mucosal edema or rhinorrhea  Right sinus exhibits no maxillary sinus tenderness  Left sinus exhibits no maxillary sinus tenderness  Mouth/Throat: Uvula is midline  Mucous membranes are not pale and not dry  Normal dentition  No oropharyngeal exudate  Eyes: EOM are normal  Pupils are equal, round, and reactive to light  Right eye exhibits no discharge  Left eye exhibits no discharge  Neck: Normal range of motion  Neck supple  No thyromegaly present  Cardiovascular: Normal rate, regular rhythm, normal heart sounds and intact distal pulses  No murmur heard  Pulmonary/Chest: Effort normal and breath sounds normal  No respiratory distress  He has no wheezes  He has no rales  Musculoskeletal: Normal range of motion  He exhibits no edema or tenderness  Lymphadenopathy:     He has no cervical adenopathy  Neurological: He is alert and oriented to person, place, and time  No cranial nerve deficit  Skin: He is not diaphoretic  Psychiatric: He has a normal mood and affect   His behavior is normal

## 2018-09-13 NOTE — ASSESSMENT & PLAN NOTE
Had seen Ortho in the past to advise possible injections and physical therapy  Patient had not shows to this at the time  Feels that eventually he will end up getting an knee replacement, did not wish to waste time when this was the eventual outcome  Patient will seek 2nd orthopedic opinion

## 2018-09-13 NOTE — PROGRESS NOTES
HPI:  Rolando Estevez is a 80 y o  male here for his yearly health maintenance exam    Patient Active Problem List   Diagnosis    Atrial fibrillation ([place] [address] )    Atrial flutter ([place] [address] )    Status post catheter ablation of atrial flutter    Dyslipidemia    Essential hypertension    WILL (obstructive sleep apnea)    Factor V Leiden mutation ([place] [address] )    Primary osteoarthritis of both knees    Erectile dysfunction    Depression, major, single episode, moderate ([place] [address] )    Insomnia    Lumbar herniated disc     Past Medical History:   Diagnosis Date    Acute venous embolism and thrombosis of deep vessels of proximal lower extremity ([place] [address] )     Last assessed: 5/18/15    Paroxysmal atrial fibrillation ([place] [address] )     Last assessed: 11/2/15       1  Advanced Directive: yes     2  Durable Power of  for Healthcare: yes- wife Carina Mason     3  Social History:               Marital History:               Work Status: retired              Drug and alcohol History: no drug use              Alcohol Use: 2-3/ week     4  General Health: fair              Regular Dental Visits:yes              Vision problems:readers- recent change in vision              Hearing Loss:mild-mod              Immunizations up to date: yes- consider Shingles                 Lifestyle:                           Healthy Diet:tries                          Tobacco Dennie Swedish                          Regular exercise:no                          Weight concerns:yes                          Drug abuse: none     5   Over the past 2 weeks, how often have you been bothered by the following:              Little interest or pleasure in doing things:not at all              Felling down, depressed or hopeless:not at all    Current Outpatient Prescriptions   Medication Sig Dispense Refill    celecoxib (CeleBREX) 200 mg capsule TAKE 1 CAPSULE DAILY 90 capsule 1    DULoxetine (CYMBALTA) 60 mg delayed release capsule Take 1 capsule (60 mg total) by mouth daily 90 capsule 1    metoprolol succinate (TOPROL-XL) 100 mg 24 hr tablet TAKE 1 TABLET BY MOUTH  DAILY 90 tablet 1    pravastatin (PRAVACHOL) 40 mg tablet Take 1 tablet (40 mg total) by mouth daily 90 tablet 1    warfarin (COUMADIN) 5 mg tablet TAKE 1/2 TO 1 TABLET BY  MOUTH DAILY BASED ON PT/INR RESULTS 90 tablet 1    zolpidem (AMBIEN) 10 mg tablet TAKE ONE TABLET BY MOUTH AT BEDTIME AS NEEDED FOR SLEEP 30 tablet 2     No current facility-administered medications for this visit  Allergies   Allergen Reactions    Morphine Nausea Only     Immunization History   Administered Date(s) Administered    Influenza 10/08/2014    Influenza Quadrivalent Preservative Free 3 years and older IM 10/08/2015, 11/03/2016    Influenza Split High Dose Preservative Free IM 10/30/2017    Influenza, high dose seasonal 0 5 mL 09/13/2018    Pneumococcal Conjugate 13-Valent 02/09/2016    Pneumococcal Polysaccharide PPV23 04/24/2017    Tdap 03/10/2014       Patient Care Team:  Janna Novak MD as PCP - [address] [place] [address]  MD Janna Hernández MD    Review of Systems      Physical Exam :  Physical Exam   Constitutional: He is oriented to person, place, and time  He appears well-developed and well-nourished  No distress  HENT:   Head: Normocephalic and atraumatic  Right Ear: Tympanic membrane, external ear and ear canal normal    Left Ear: Tympanic membrane, external ear and ear canal normal    Nose: No mucosal edema or rhinorrhea  Right sinus exhibits no maxillary sinus tenderness  Left sinus exhibits no maxillary sinus tenderness  Mouth/Throat: Uvula is midline  Mucous membranes are not pale and not dry  Normal dentition  No oropharyngeal exudate  Eyes: EOM are normal  Pupils are equal, round, and reactive to light  Right eye exhibits no discharge  Left eye exhibits no discharge  Neck: Normal range of motion  Neck supple  No thyromegaly present     Cardiovascular: Normal rate, regular rhythm, normal heart sounds and intact distal pulses  No murmur heard  Pulmonary/Chest: Effort normal and breath sounds normal  No respiratory distress  He has no wheezes  He has no rales  Abdominal: Soft  He exhibits no distension and no mass  There is no tenderness  Musculoskeletal: Normal range of motion  He exhibits no edema or tenderness  Lymphadenopathy:     He has no cervical adenopathy  Neurological: He is alert and oriented to person, place, and time  No cranial nerve deficit  Skin: He is not diaphoretic  Psychiatric: He has a normal mood and affect  His behavior is normal          Reviewed Updated [facility] Prior Wellness Visits:   Last Health Maintenance visit information was reviewed, patient interviewed , no change since last HM visit yes  Last HM visit information was reviewed, patient interviewed and updates made to the record today yes    Assessment and Plan:  1  Need for vaccination  influenza vaccine, 7222-5050, high-dose, PF 0 5 mL, for patients 65 yr+ (FLUZONE HIGH-DOSE)   2  Screening for colon cancer  Ambulatory referral to Gastroenterology   3  Encounter for wellness examination in adult     4  Encounter for hepatitis C screening test for low risk patient         Health Maintenance Due   Topic Date Due    Fall Risk  02/11/2016    CRC Screening: Colonoscopy  02/06/2017    INFLUENZA VACCINE  09/01/2018        Patient Instructions   Health maintenance-you are due a colonoscopy please call Dr Lachelle Morillo to schedule  Flu shot today  Metabolic screens are current  Depression screen negative  Advanced Directive in place  Discussed hepatitis-C  Patient agrees the be screen with next labs  Discussed PSA screening-you have declined

## 2018-09-14 ENCOUNTER — ANTICOAG VISIT (OUTPATIENT)
Dept: FAMILY MEDICINE CLINIC | Facility: CLINIC | Age: 68
End: 2018-09-14

## 2018-09-21 ENCOUNTER — HOSPITAL ENCOUNTER (OUTPATIENT)
Dept: NON INVASIVE DIAGNOSTICS | Facility: CLINIC | Age: 68
Discharge: HOME | End: 2018-09-21
Payer: COMMERCIAL

## 2018-09-21 DIAGNOSIS — I48.19 PERSISTENT ATRIAL FIBRILLATION (HCC): ICD-10-CM

## 2018-09-21 PROCEDURE — A9502 TC99M TETROFOSMIN: HCPCS

## 2018-09-21 PROCEDURE — 93018 CV STRESS TEST I&R ONLY: CPT | Performed by: INTERNAL MEDICINE

## 2018-09-21 PROCEDURE — 78452 HT MUSCLE IMAGE SPECT MULT: CPT

## 2018-09-21 PROCEDURE — 93016 CV STRESS TEST SUPVJ ONLY: CPT | Performed by: INTERNAL MEDICINE

## 2018-09-21 PROCEDURE — 78452 HT MUSCLE IMAGE SPECT MULT: CPT | Performed by: INTERNAL MEDICINE

## 2018-09-21 PROCEDURE — 93017 CV STRESS TEST TRACING ONLY: CPT

## 2018-09-24 ENCOUNTER — TELEPHONE (OUTPATIENT)
Dept: CARDIOLOGY CLINIC | Facility: CLINIC | Age: 68
End: 2018-09-24

## 2018-09-24 DIAGNOSIS — I48.19 PERSISTENT ATRIAL FIBRILLATION (HCC): Primary | ICD-10-CM

## 2018-09-24 LAB
CHEST PAIN STATEMENT: NORMAL
MAX DIASTOLIC BP: 99 MMHG
MAX HEART RATE: 164 BPM
MAX PREDICTED HEART RATE: 153 BPM
MAX. SYSTOLIC BP: 176 MMHG
PROTOCOL NAME: NORMAL
TARGET HR FORMULA: NORMAL
TEST INDICATION: NORMAL
TIME IN EXERCISE PHASE: NORMAL

## 2018-09-24 RX ORDER — FLECAINIDE ACETATE 100 MG/1
100 TABLET ORAL 2 TIMES DAILY
Qty: 60 TABLET | Refills: 5 | Status: CANCELLED | OUTPATIENT
Start: 2018-09-24

## 2018-09-24 NOTE — TELEPHONE ENCOUNTER
I wanted to start flecainide 100 mg twice daily  Ginette, apologized to him as I had called in yet  Can you call this in for me? Also, I want to get him set up for an ECG nurse's visit in 2-3 weeks, and then I will determine need for another cardioversion at that time  Thank you

## 2018-09-24 NOTE — TELEPHONE ENCOUNTER
Okay   Sounds good and thank you for doing that  nontender/soft/no distention/normal/no masses palpable

## 2018-09-24 NOTE — TELEPHONE ENCOUNTER
Pt called stating he was in on Friday 9/21/18 and had a stress test completed and spoke with Dr Kinza Judd who stated he would prescribe a medication for his arrhythmia  An ablation was also discussed however, this medication was to be started first   Pt stated he went to pick it up and it was not called in  Pt was unsure of what the medication was        Please advise

## 2018-09-24 NOTE — TELEPHONE ENCOUNTER
Medication was called in as instructed by Dr Magalys Louise  Pt was notified of medication and instructions were given      F/u nurse visit for EKG is scheduled for 10/5/18 at 10:30 am

## 2018-09-28 ENCOUNTER — ANTICOAG VISIT (OUTPATIENT)
Dept: FAMILY MEDICINE CLINIC | Facility: CLINIC | Age: 68
End: 2018-09-28

## 2018-09-28 LAB
ALBUMIN SERPL-MCNC: 3.9 G/DL (ref 3.6–4.8)
ALBUMIN/GLOB SERPL: 1.4 {RATIO} (ref 1.2–2.2)
ALP SERPL-CCNC: 84 IU/L (ref 39–117)
ALT SERPL-CCNC: 16 IU/L (ref 0–44)
AST SERPL-CCNC: 21 IU/L (ref 0–40)
BILIRUB SERPL-MCNC: 0.6 MG/DL (ref 0–1.2)
BUN SERPL-MCNC: 16 MG/DL (ref 8–27)
BUN/CREAT SERPL: 18 (ref 10–24)
CALCIUM SERPL-MCNC: 9.2 MG/DL (ref 8.6–10.2)
CHLORIDE SERPL-SCNC: 99 MMOL/L (ref 96–106)
CHOLEST SERPL-MCNC: 175 MG/DL (ref 100–199)
CHOLEST/HDLC SERPL: 3.3 RATIO (ref 0–5)
CO2 SERPL-SCNC: 26 MMOL/L (ref 20–29)
CREAT SERPL-MCNC: 0.9 MG/DL (ref 0.76–1.27)
GLOBULIN SER-MCNC: 2.8 G/DL (ref 1.5–4.5)
GLUCOSE SERPL-MCNC: 129 MG/DL (ref 65–99)
HCV AB S/CO SERPL IA: <0.1 S/CO RATIO (ref 0–0.9)
HDLC SERPL-MCNC: 53 MG/DL
INR PPP: 2.1 (ref 0.86–1.17)
LDLC SERPL CALC-MCNC: 104 MG/DL (ref 0–99)
POTASSIUM SERPL-SCNC: 4.3 MMOL/L (ref 3.5–5.2)
PROT SERPL-MCNC: 6.7 G/DL (ref 6–8.5)
SL AMB EGFR AFRICAN AMERICAN: 102 ML/MIN/1.73
SL AMB EGFR NON AFRICAN AMERICAN: 88 ML/MIN/1.73
SL AMB VLDL CHOLESTEROL CALC: 18 MG/DL (ref 5–40)
SODIUM SERPL-SCNC: 139 MMOL/L (ref 134–144)
TRIGL SERPL-MCNC: 91 MG/DL (ref 0–149)

## 2018-10-01 ENCOUNTER — ANTICOAG VISIT (OUTPATIENT)
Dept: FAMILY MEDICINE CLINIC | Facility: CLINIC | Age: 68
End: 2018-10-01

## 2018-10-01 NOTE — PROGRESS NOTES
Call patient with lab result-    Labs are okay slightly elevated fasting sugar, hep C antibody negative, also PT/INR at 2 1- same dose

## 2018-10-05 ENCOUNTER — CLINICAL SUPPORT (OUTPATIENT)
Dept: CARDIOLOGY CLINIC | Facility: CLINIC | Age: 68
End: 2018-10-05
Payer: COMMERCIAL

## 2018-10-05 VITALS
HEART RATE: 92 BPM | SYSTOLIC BLOOD PRESSURE: 120 MMHG | DIASTOLIC BLOOD PRESSURE: 78 MMHG | BODY MASS INDEX: 35.38 KG/M2 | HEIGHT: 72 IN | WEIGHT: 261.2 LBS

## 2018-10-05 DIAGNOSIS — I48.91 ATRIAL FIBRILLATION, UNSPECIFIED TYPE (HCC): Primary | ICD-10-CM

## 2018-10-05 PROCEDURE — 93000 ELECTROCARDIOGRAM COMPLETE: CPT

## 2018-10-05 PROCEDURE — 4040F PNEUMOC VAC/ADMIN/RCVD: CPT

## 2018-10-05 PROCEDURE — 99024 POSTOP FOLLOW-UP VISIT: CPT

## 2018-10-05 RX ORDER — FLECAINIDE ACETATE 100 MG/1
100 TABLET ORAL 2 TIMES DAILY
COMMUNITY
End: 2018-11-27 | Stop reason: SDUPTHER

## 2018-10-05 NOTE — PROGRESS NOTES
Pt seen today for EKG visit  Pt was recently started on flecainide for A-fib  Pt feels well and has no complaints currently  Meds and allergies reviewed, vitals obtained  /78  Pulse 92, irreg  EKG reviewed by Dr Titus Mosquera, no changes at this time

## 2018-10-07 DIAGNOSIS — I48.91 ATRIAL FIBRILLATION, UNSPECIFIED TYPE (HCC): ICD-10-CM

## 2018-10-07 DIAGNOSIS — E78.5 DYSLIPIDEMIA: ICD-10-CM

## 2018-10-08 RX ORDER — METOPROLOL SUCCINATE 100 MG/1
TABLET, EXTENDED RELEASE ORAL DAILY
Qty: 90 TABLET | Refills: 1 | Status: SHIPPED | OUTPATIENT
Start: 2018-10-08 | End: 2019-04-13 | Stop reason: SDUPTHER

## 2018-10-08 RX ORDER — PRAVASTATIN SODIUM 40 MG
TABLET ORAL
Qty: 90 TABLET | Refills: 1 | Status: SHIPPED | OUTPATIENT
Start: 2018-10-08 | End: 2019-04-13 | Stop reason: SDUPTHER

## 2018-10-26 ENCOUNTER — ANTICOAG VISIT (OUTPATIENT)
Dept: FAMILY MEDICINE CLINIC | Facility: CLINIC | Age: 68
End: 2018-10-26

## 2018-11-09 ENCOUNTER — TELEPHONE (OUTPATIENT)
Dept: CARDIOLOGY CLINIC | Facility: CLINIC | Age: 68
End: 2018-11-09

## 2018-11-09 DIAGNOSIS — I48.19 PERSISTENT ATRIAL FIBRILLATION (HCC): Primary | ICD-10-CM

## 2018-11-09 NOTE — TELEPHONE ENCOUNTER
Pt called, asking if you have spoken to Dr Chelsey Mcneal about possible ablation  He is asking for an update on what to do next, what is next follow up? Please advise      Pt cb: [phone]

## 2018-11-09 NOTE — TELEPHONE ENCOUNTER
I called and spoke with Mortimer Pointer  We are going to schedule a cardioversion in the near future  I will place an order for this, if you can just let [facility] know that he we are going to be scheduling a cardioversion at the [facility] for him sometime in the next month  Our procedure schedulers do not do this, but they do know to contact in [facility]  Thank you

## 2018-11-09 NOTE — TELEPHONE ENCOUNTER
Received a call from Oleksandr Vallecillo RN in [place] asking to inform [facility] that as per Dr Malia Cheng pt will be having a Cardioversion in the near future        Faxed form and information

## 2018-11-19 ENCOUNTER — ANTICOAG VISIT (OUTPATIENT)
Dept: FAMILY MEDICINE CLINIC | Facility: CLINIC | Age: 68
End: 2018-11-19

## 2018-11-20 DIAGNOSIS — F32.A DEPRESSION, UNSPECIFIED DEPRESSION TYPE: ICD-10-CM

## 2018-11-20 DIAGNOSIS — E78.5 DYSLIPIDEMIA: ICD-10-CM

## 2018-11-20 DIAGNOSIS — M51.26 LUMBAR HERNIATED DISC: ICD-10-CM

## 2018-11-20 DIAGNOSIS — I48.91 ATRIAL FIBRILLATION, UNSPECIFIED TYPE (HCC): ICD-10-CM

## 2018-11-20 RX ORDER — METOPROLOL SUCCINATE 100 MG/1
TABLET, EXTENDED RELEASE ORAL DAILY
Qty: 90 TABLET | Refills: 0 | OUTPATIENT
Start: 2018-11-20

## 2018-11-20 RX ORDER — PRAVASTATIN SODIUM 40 MG
40 TABLET ORAL DAILY
Qty: 90 TABLET | Refills: 0 | OUTPATIENT
Start: 2018-11-20

## 2018-11-20 NOTE — TELEPHONE ENCOUNTER
Was the Tambocor validated with the patient  I do not see this any the cardiology notes in epic this is not something I normally prescribed  This should be done by the cardiologist with electrophysiologist if he is still supposed to be on it

## 2018-11-23 RX ORDER — FLECAINIDE ACETATE 100 MG/1
100 TABLET ORAL 2 TIMES DAILY
Qty: 180 TABLET | Refills: 0 | OUTPATIENT
Start: 2018-11-23

## 2018-11-23 RX ORDER — DULOXETIN HYDROCHLORIDE 60 MG/1
60 CAPSULE, DELAYED RELEASE ORAL DAILY
Qty: 90 CAPSULE | Refills: 0 | Status: SHIPPED | OUTPATIENT
Start: 2018-11-23 | End: 2018-12-24 | Stop reason: SDUPTHER

## 2018-11-23 RX ORDER — CELECOXIB 200 MG/1
200 CAPSULE ORAL DAILY
Qty: 90 CAPSULE | Refills: 0 | Status: SHIPPED | OUTPATIENT
Start: 2018-11-23 | End: 2018-12-05 | Stop reason: SDUPTHER

## 2018-11-27 DIAGNOSIS — I48.91 ATRIAL FIBRILLATION, UNSPECIFIED TYPE (HCC): Primary | ICD-10-CM

## 2018-11-27 RX ORDER — FLECAINIDE ACETATE 100 MG/1
100 TABLET ORAL 2 TIMES DAILY
Qty: 180 TABLET | Refills: 3 | Status: SHIPPED | OUTPATIENT
Start: 2018-11-27 | End: 2019-10-12 | Stop reason: SDUPTHER

## 2018-11-29 ENCOUNTER — TELEPHONE (OUTPATIENT)
Dept: FAMILY MEDICINE CLINIC | Facility: CLINIC | Age: 68
End: 2018-11-29

## 2018-11-29 DIAGNOSIS — G47.00 INSOMNIA, UNSPECIFIED TYPE: ICD-10-CM

## 2018-11-29 RX ORDER — ZOLPIDEM TARTRATE 10 MG/1
10 TABLET ORAL
Qty: 30 TABLET | Refills: 3 | Status: SHIPPED | OUTPATIENT
Start: 2018-11-29 | End: 2019-10-24 | Stop reason: SDUPTHER

## 2018-11-29 NOTE — TELEPHONE ENCOUNTER
Should hold the warfarin 5-6 days prior to tooth extraction  Assuming all goes well no significant bleeding should restart the evening after his procedure with 7 5 mg by 2 days and then return to his usual dosing and get a blood test 4-5 days after restarting

## 2018-11-29 NOTE — TELEPHONE ENCOUNTER
Call from patient, requesting RX refill on Ambien to [facility] done 9/13/18 by Dr Sharyon Romberg

## 2018-11-29 NOTE — TELEPHONE ENCOUNTER
Patient called office, states he needs to get a couple molars pulled as they are broke off  He is looking for direction on how to hold Coumadin  He does not have the appointment scheduled as of yet, pending these instruction  Advised will have Dr Abraham Ravi review and get back to him

## 2018-12-01 DIAGNOSIS — M51.26 LUMBAR HERNIATED DISC: ICD-10-CM

## 2018-12-05 DIAGNOSIS — M51.26 LUMBAR HERNIATED DISC: ICD-10-CM

## 2018-12-05 NOTE — TELEPHONE ENCOUNTER
Yes there is a risk  Would restart as soon as procedure done assuming no bleeding complications  Risk of bleeding during tooth extractions is the issue

## 2018-12-05 NOTE — TELEPHONE ENCOUNTER
Call from patient, wife is worried about the risk of a blood clot with being off of Coumadin for 5 days for tooth extractions  Today will be his first of 5 days off Coumadin  Reassured and told him I would have Dr Олег Nieves, review and advise

## 2018-12-06 DIAGNOSIS — J01.90 ACUTE SINUSITIS, RECURRENCE NOT SPECIFIED, UNSPECIFIED LOCATION: Primary | ICD-10-CM

## 2018-12-06 RX ORDER — CELECOXIB 200 MG/1
200 CAPSULE ORAL DAILY
Qty: 90 CAPSULE | Refills: 0 | Status: SHIPPED | OUTPATIENT
Start: 2018-12-06 | End: 2019-02-18 | Stop reason: SDUPTHER

## 2018-12-06 RX ORDER — AMOXICILLIN 500 MG/1
500 TABLET, FILM COATED ORAL 3 TIMES DAILY
Qty: 30 TABLET | Refills: 0 | Status: SHIPPED | OUTPATIENT
Start: 2018-12-06 | End: 2019-01-05

## 2018-12-06 NOTE — TELEPHONE ENCOUNTER
Verbally reviewed patients request for antibiotic with Dr Jay Jay Worley  Patient is scheduled for tooth extractions next week  Has had symptoms for a couple weeks per wife      Per Dr Jay Jay Worley, Amox 500 tid

## 2018-12-17 RX ORDER — CELECOXIB 200 MG/1
CAPSULE ORAL
Qty: 90 CAPSULE | Refills: 1 | Status: SHIPPED | OUTPATIENT
Start: 2018-12-17 | End: 2019-04-12 | Stop reason: SDUPTHER

## 2018-12-19 ENCOUNTER — ANTICOAG VISIT (OUTPATIENT)
Dept: FAMILY MEDICINE CLINIC | Facility: CLINIC | Age: 68
End: 2018-12-19

## 2018-12-19 DIAGNOSIS — Z79.01 CURRENT USE OF LONG TERM ANTICOAGULATION: Primary | ICD-10-CM

## 2018-12-19 NOTE — PROGRESS NOTES
Notes recorded by Trey Giordano MD on 12/19/2018 at 7:39 AM EST  Call patient with lab result-see flow sheet, verify dosing    Spoke to patient -- he had been off of Coumadin for 5 days for dental extractions  He has been back on now since last Tuesday after the extraction  He restarted at 7 5mg x2 days then back to 2 5mg Daily  Patient states he felt his blood would be thinner as he cut himself a little yesterday and bled more than normal and also after the PT/INR was drawn he bled more  Will recheck again in 2 weeks    [facility] order placed

## 2018-12-24 DIAGNOSIS — M51.26 LUMBAR HERNIATED DISC: ICD-10-CM

## 2018-12-24 DIAGNOSIS — F32.A DEPRESSION, UNSPECIFIED DEPRESSION TYPE: ICD-10-CM

## 2018-12-24 RX ORDER — CELECOXIB 200 MG/1
CAPSULE ORAL
Qty: 90 CAPSULE | Refills: 1 | Status: SHIPPED | OUTPATIENT
Start: 2018-12-24 | End: 2019-04-12 | Stop reason: SDUPTHER

## 2018-12-24 RX ORDER — DULOXETIN HYDROCHLORIDE 60 MG/1
CAPSULE, DELAYED RELEASE ORAL
Qty: 90 CAPSULE | Refills: 1 | Status: SHIPPED | OUTPATIENT
Start: 2018-12-24 | End: 2019-07-14 | Stop reason: SDUPTHER

## 2018-12-27 ENCOUNTER — TRANSCRIBE ORDERS (OUTPATIENT)
Dept: LAB | Facility: HOSPITAL | Age: 68
End: 2018-12-27

## 2018-12-27 ENCOUNTER — APPOINTMENT (OUTPATIENT)
Dept: LAB | Facility: HOSPITAL | Age: 68
End: 2018-12-27
Payer: COMMERCIAL

## 2018-12-27 ENCOUNTER — ANTICOAG VISIT (OUTPATIENT)
Dept: FAMILY MEDICINE CLINIC | Facility: CLINIC | Age: 68
End: 2018-12-27

## 2018-12-27 ENCOUNTER — HOSPITAL ENCOUNTER (OUTPATIENT)
Dept: NON INVASIVE DIAGNOSTICS | Facility: HOSPITAL | Age: 68
Discharge: HOME | End: 2018-12-27
Attending: INTERNAL MEDICINE
Payer: COMMERCIAL

## 2018-12-27 VITALS
HEART RATE: 62 BPM | WEIGHT: 261 LBS | DIASTOLIC BLOOD PRESSURE: 78 MMHG | HEIGHT: 72 IN | RESPIRATION RATE: 18 BRPM | OXYGEN SATURATION: 96 % | BODY MASS INDEX: 35.35 KG/M2 | SYSTOLIC BLOOD PRESSURE: 134 MMHG | TEMPERATURE: 98 F

## 2018-12-27 DIAGNOSIS — I48.19 PERSISTENT ATRIAL FIBRILLATION (HCC): ICD-10-CM

## 2018-12-27 DIAGNOSIS — Z79.01 CURRENT USE OF LONG TERM ANTICOAGULATION: Primary | ICD-10-CM

## 2018-12-27 LAB
INR PPP: 2.03 (ref 0.86–1.17)
PROTHROMBIN TIME: 21.8 SECONDS (ref 11.8–14.2)

## 2018-12-27 PROCEDURE — 93005 ELECTROCARDIOGRAM TRACING: CPT

## 2018-12-27 PROCEDURE — 36415 COLL VENOUS BLD VENIPUNCTURE: CPT

## 2018-12-27 PROCEDURE — 85610 PROTHROMBIN TIME: CPT

## 2018-12-28 LAB
ATRIAL RATE: 61 BPM
P AXIS: 71 DEGREES
PR INTERVAL: 212 MS
QRS AXIS: 97 DEGREES
QRSD INTERVAL: 142 MS
QT INTERVAL: 440 MS
QTC INTERVAL: 442 MS
T WAVE AXIS: 9 DEGREES
VENTRICULAR RATE: 61 BPM

## 2018-12-28 PROCEDURE — 93010 ELECTROCARDIOGRAM REPORT: CPT | Performed by: INTERNAL MEDICINE

## 2019-01-18 ENCOUNTER — ANTICOAG VISIT (OUTPATIENT)
Dept: FAMILY MEDICINE CLINIC | Facility: CLINIC | Age: 69
End: 2019-01-18

## 2019-02-01 ENCOUNTER — ANTICOAG VISIT (OUTPATIENT)
Dept: FAMILY MEDICINE CLINIC | Facility: CLINIC | Age: 69
End: 2019-02-01

## 2019-02-01 ENCOUNTER — TELEPHONE (OUTPATIENT)
Dept: FAMILY MEDICINE CLINIC | Facility: CLINIC | Age: 69
End: 2019-02-01

## 2019-02-01 LAB
INR PPP: 1.5 (ref 0.8–1.2)
PROTHROMBIN TIME: 15.6 SEC (ref 9.1–12)

## 2019-02-18 ENCOUNTER — ANTICOAG VISIT (OUTPATIENT)
Dept: FAMILY MEDICINE CLINIC | Facility: CLINIC | Age: 69
End: 2019-02-18

## 2019-02-18 DIAGNOSIS — M51.26 LUMBAR HERNIATED DISC: ICD-10-CM

## 2019-02-18 RX ORDER — CELECOXIB 200 MG/1
200 CAPSULE ORAL DAILY
Qty: 90 CAPSULE | Refills: 1 | Status: SHIPPED | OUTPATIENT
Start: 2019-02-18 | End: 2019-10-12 | Stop reason: SDUPTHER

## 2019-03-04 ENCOUNTER — ANTICOAG VISIT (OUTPATIENT)
Dept: FAMILY MEDICINE CLINIC | Facility: CLINIC | Age: 69
End: 2019-03-04

## 2019-03-15 DIAGNOSIS — G47.00 INSOMNIA, UNSPECIFIED TYPE: ICD-10-CM

## 2019-03-18 ENCOUNTER — ANTICOAG VISIT (OUTPATIENT)
Dept: FAMILY MEDICINE CLINIC | Facility: CLINIC | Age: 69
End: 2019-03-18

## 2019-03-18 RX ORDER — ZOLPIDEM TARTRATE 10 MG/1
TABLET ORAL
Qty: 30 TABLET | Refills: 0 | OUTPATIENT
Start: 2019-03-18

## 2019-04-03 ENCOUNTER — TELEPHONE (OUTPATIENT)
Dept: FAMILY MEDICINE CLINIC | Facility: CLINIC | Age: 69
End: 2019-04-03

## 2019-04-03 ENCOUNTER — ANTICOAG VISIT (OUTPATIENT)
Dept: FAMILY MEDICINE CLINIC | Facility: CLINIC | Age: 69
End: 2019-04-03

## 2019-04-03 DIAGNOSIS — Z79.01 ANTICOAGULATED ON COUMADIN: Primary | ICD-10-CM

## 2019-04-12 ENCOUNTER — OFFICE VISIT (OUTPATIENT)
Dept: CARDIOLOGY CLINIC | Facility: CLINIC | Age: 69
End: 2019-04-12
Payer: COMMERCIAL

## 2019-04-12 VITALS
HEIGHT: 72 IN | DIASTOLIC BLOOD PRESSURE: 82 MMHG | SYSTOLIC BLOOD PRESSURE: 144 MMHG | BODY MASS INDEX: 38.79 KG/M2 | HEART RATE: 54 BPM | WEIGHT: 286.4 LBS

## 2019-04-12 DIAGNOSIS — E78.5 DYSLIPIDEMIA: ICD-10-CM

## 2019-04-12 DIAGNOSIS — I48.0 PAROXYSMAL ATRIAL FIBRILLATION (HCC): Primary | ICD-10-CM

## 2019-04-12 DIAGNOSIS — I48.3 TYPICAL ATRIAL FLUTTER (HCC): ICD-10-CM

## 2019-04-12 DIAGNOSIS — Z98.890 STATUS POST CATHETER ABLATION OF ATRIAL FLUTTER: ICD-10-CM

## 2019-04-12 DIAGNOSIS — D68.51 FACTOR V LEIDEN MUTATION (HCC): ICD-10-CM

## 2019-04-12 DIAGNOSIS — I10 ESSENTIAL HYPERTENSION: ICD-10-CM

## 2019-04-12 DIAGNOSIS — G47.33 OSA (OBSTRUCTIVE SLEEP APNEA): ICD-10-CM

## 2019-04-12 PROCEDURE — 99214 OFFICE O/P EST MOD 30 MIN: CPT | Performed by: INTERNAL MEDICINE

## 2019-04-12 PROCEDURE — 93000 ELECTROCARDIOGRAM COMPLETE: CPT | Performed by: INTERNAL MEDICINE

## 2019-04-13 DIAGNOSIS — I48.91 ATRIAL FIBRILLATION, UNSPECIFIED TYPE (HCC): ICD-10-CM

## 2019-04-13 DIAGNOSIS — E78.5 DYSLIPIDEMIA: ICD-10-CM

## 2019-04-13 RX ORDER — PRAVASTATIN SODIUM 40 MG
TABLET ORAL
Qty: 90 TABLET | Refills: 1 | Status: SHIPPED | OUTPATIENT
Start: 2019-04-13 | End: 2019-10-12 | Stop reason: SDUPTHER

## 2019-04-13 RX ORDER — METOPROLOL SUCCINATE 100 MG/1
TABLET, EXTENDED RELEASE ORAL DAILY
Qty: 90 TABLET | Refills: 1 | Status: SHIPPED | OUTPATIENT
Start: 2019-04-13 | End: 2019-10-12 | Stop reason: SDUPTHER

## 2019-04-17 ENCOUNTER — ANTICOAG VISIT (OUTPATIENT)
Dept: FAMILY MEDICINE CLINIC | Facility: CLINIC | Age: 69
End: 2019-04-17

## 2019-04-17 ENCOUNTER — TELEPHONE (OUTPATIENT)
Dept: FAMILY MEDICINE CLINIC | Facility: CLINIC | Age: 69
End: 2019-04-17

## 2019-05-01 ENCOUNTER — ANTICOAG VISIT (OUTPATIENT)
Dept: FAMILY MEDICINE CLINIC | Facility: CLINIC | Age: 69
End: 2019-05-01

## 2019-05-01 DIAGNOSIS — Z79.01 CURRENT USE OF LONG TERM ANTICOAGULATION: Primary | ICD-10-CM

## 2019-05-15 ENCOUNTER — ANTICOAG VISIT (OUTPATIENT)
Dept: FAMILY MEDICINE CLINIC | Facility: CLINIC | Age: 69
End: 2019-05-15

## 2019-05-21 DIAGNOSIS — D68.51 FACTOR V LEIDEN MUTATION (HCC): ICD-10-CM

## 2019-05-22 RX ORDER — WARFARIN SODIUM 5 MG/1
TABLET ORAL
Qty: 90 TABLET | Refills: 0 | Status: SHIPPED | OUTPATIENT
Start: 2019-05-22 | End: 2019-10-24 | Stop reason: SDUPTHER

## 2019-05-29 ENCOUNTER — ANTICOAG VISIT (OUTPATIENT)
Dept: FAMILY MEDICINE CLINIC | Facility: CLINIC | Age: 69
End: 2019-05-29

## 2019-05-29 LAB
INR PPP: 2.3 (ref 0.8–1.2)
PROTHROMBIN TIME: 23.3 SEC (ref 9.1–12)

## 2019-06-19 ENCOUNTER — ANTICOAG VISIT (OUTPATIENT)
Dept: FAMILY MEDICINE CLINIC | Facility: CLINIC | Age: 69
End: 2019-06-19

## 2019-06-19 ENCOUNTER — TELEPHONE (OUTPATIENT)
Dept: FAMILY MEDICINE CLINIC | Facility: CLINIC | Age: 69
End: 2019-06-19

## 2019-06-26 ENCOUNTER — ANTICOAG VISIT (OUTPATIENT)
Dept: FAMILY MEDICINE CLINIC | Facility: CLINIC | Age: 69
End: 2019-06-26

## 2019-07-10 ENCOUNTER — ANTICOAG VISIT (OUTPATIENT)
Dept: FAMILY MEDICINE CLINIC | Facility: CLINIC | Age: 69
End: 2019-07-10

## 2019-07-10 DIAGNOSIS — Z79.01 CURRENT USE OF LONG TERM ANTICOAGULATION: Primary | ICD-10-CM

## 2019-07-10 NOTE — PROGRESS NOTES
Spoke to wife & Patient who confirms that he is taking his dosage correctly:  5mg Monday & Friday  2 5mg all others  He only took the extra 1/2 tab on 6/26/19 as directed  Will have Dr Denisse Santamaria review and advise  Notes recorded by Pamela Colindres MD on 7/10/2019 at 1:01 PM EDT  Call patient with lab result-would have skip 2 days then return to usual routine and check in 2 weeks  Notes recorded by Pamela Colindres MD on 7/10/2019 at 7:38 AM EDT  Call patient with lab result-  Review- was totake extra 1/2 pill X 1 day, did he take more?

## 2019-07-14 DIAGNOSIS — F32.A DEPRESSION, UNSPECIFIED DEPRESSION TYPE: ICD-10-CM

## 2019-07-15 RX ORDER — DULOXETIN HYDROCHLORIDE 60 MG/1
CAPSULE, DELAYED RELEASE ORAL
Qty: 90 CAPSULE | Refills: 0 | Status: SHIPPED | OUTPATIENT
Start: 2019-07-15 | End: 2019-10-12 | Stop reason: SDUPTHER

## 2019-07-25 ENCOUNTER — ANTICOAG VISIT (OUTPATIENT)
Dept: FAMILY MEDICINE CLINIC | Facility: CLINIC | Age: 69
End: 2019-07-25

## 2019-07-25 DIAGNOSIS — Z79.01 CURRENT USE OF LONG TERM ANTICOAGULATION: ICD-10-CM

## 2019-07-25 DIAGNOSIS — D68.51 FACTOR V LEIDEN MUTATION (HCC): Primary | ICD-10-CM

## 2019-07-25 NOTE — PROGRESS NOTES
Notes recorded by Jasper Sandhoff, MD on 7/25/2019 at 8:04 AM EDT  Call patient with lab result-same    [facility] INR order placed

## 2019-08-07 ENCOUNTER — ANTICOAG VISIT (OUTPATIENT)
Dept: FAMILY MEDICINE CLINIC | Facility: CLINIC | Age: 69
End: 2019-08-07

## 2019-08-07 DIAGNOSIS — Z79.01 CURRENT USE OF LONG TERM ANTICOAGULATION: ICD-10-CM

## 2019-08-07 DIAGNOSIS — D68.51 FACTOR V LEIDEN MUTATION (HCC): Primary | ICD-10-CM

## 2019-08-07 LAB
INR PPP: 2.5 (ref 0.8–1.2)
PROTHROMBIN TIME: 24.2 SEC (ref 9.1–12)

## 2019-08-07 NOTE — PROGRESS NOTES
Notes recorded by Janna Novak MD on 8/7/2019 at 12:25 PM EDT  Call patient with lab result-same    New INR order placed to [facility]

## 2019-08-21 ENCOUNTER — ANTICOAG VISIT (OUTPATIENT)
Dept: FAMILY MEDICINE CLINIC | Facility: CLINIC | Age: 69
End: 2019-08-21

## 2019-08-21 DIAGNOSIS — Z79.01 LONG TERM CURRENT USE OF ANTICOAGULANT: ICD-10-CM

## 2019-08-21 DIAGNOSIS — D68.51 FACTOR V LEIDEN MUTATION (HCC): Primary | ICD-10-CM

## 2019-08-21 NOTE — PROGRESS NOTES
Notes recorded by Yayo Thacker MD on 8/21/2019 at 7:48 AM EDT  Call patient with lab result-  Same    [facility] INR order placed

## 2019-09-04 ENCOUNTER — ANTICOAG VISIT (OUTPATIENT)
Dept: FAMILY MEDICINE CLINIC | Facility: CLINIC | Age: 69
End: 2019-09-04

## 2019-09-18 ENCOUNTER — ANTICOAG VISIT (OUTPATIENT)
Dept: FAMILY MEDICINE CLINIC | Facility: CLINIC | Age: 69
End: 2019-09-18

## 2019-09-18 DIAGNOSIS — Z79.01 CURRENT USE OF LONG TERM ANTICOAGULATION: ICD-10-CM

## 2019-09-18 DIAGNOSIS — D68.51 FACTOR V LEIDEN MUTATION (HCC): Primary | ICD-10-CM

## 2019-09-18 LAB
INR PPP: 2.2 (ref 0.8–1.2)
PROTHROMBIN TIME: 21.1 SEC (ref 9.1–12)

## 2019-09-18 NOTE — PROGRESS NOTES
Message to continue same and recheck in 2 weeks, any ?'s to call office      Notes recorded by Cora Iglesias MD on 9/18/2019 at 8:24 AM EDT  Same    INR order placed to [facility]

## 2019-10-02 ENCOUNTER — ANTICOAG VISIT (OUTPATIENT)
Dept: FAMILY MEDICINE CLINIC | Facility: CLINIC | Age: 69
End: 2019-10-02

## 2019-10-02 DIAGNOSIS — D68.51 FACTOR V LEIDEN MUTATION (HCC): Primary | ICD-10-CM

## 2019-10-02 DIAGNOSIS — Z79.01 LONG TERM CURRENT USE OF ANTICOAGULANT: ICD-10-CM

## 2019-10-02 LAB
INR PPP: 2.5 (ref 0.8–1.2)
PROTHROMBIN TIME: 24 SEC (ref 9.1–12)

## 2019-10-02 NOTE — PROGRESS NOTES
Notes recorded by Alexandra Sawyer MD on 10/2/2019 at 7:43 AM EDT  Call patient with lab result-same    New INR order placed

## 2019-10-08 DIAGNOSIS — I10 ESSENTIAL HYPERTENSION: Primary | ICD-10-CM

## 2019-10-11 ENCOUNTER — ANTICOAG VISIT (OUTPATIENT)
Dept: FAMILY MEDICINE CLINIC | Facility: CLINIC | Age: 69
End: 2019-10-11

## 2019-10-11 LAB
ALBUMIN SERPL-MCNC: 4.3 G/DL (ref 3.6–4.8)
ALBUMIN/GLOB SERPL: 1.6 {RATIO} (ref 1.2–2.2)
ALP SERPL-CCNC: 68 IU/L (ref 39–117)
ALT SERPL-CCNC: 16 IU/L (ref 0–44)
AST SERPL-CCNC: 19 IU/L (ref 0–40)
BILIRUB SERPL-MCNC: 0.5 MG/DL (ref 0–1.2)
BUN SERPL-MCNC: 14 MG/DL (ref 8–27)
BUN/CREAT SERPL: 18 (ref 10–24)
CALCIUM SERPL-MCNC: 9.4 MG/DL (ref 8.6–10.2)
CHLORIDE SERPL-SCNC: 102 MMOL/L (ref 96–106)
CHOLEST SERPL-MCNC: 245 MG/DL (ref 100–199)
CHOLEST/HDLC SERPL: 4.5 RATIO (ref 0–5)
CO2 SERPL-SCNC: 26 MMOL/L (ref 20–29)
CREAT SERPL-MCNC: 0.8 MG/DL (ref 0.76–1.27)
GLOBULIN SER-MCNC: 2.7 G/DL (ref 1.5–4.5)
GLUCOSE SERPL-MCNC: 99 MG/DL (ref 65–99)
HDLC SERPL-MCNC: 54 MG/DL
INR PPP: 2.3 (ref 0.8–1.2)
LDLC SERPL CALC-MCNC: 156 MG/DL (ref 0–99)
POTASSIUM SERPL-SCNC: 4.2 MMOL/L (ref 3.5–5.2)
PROT SERPL-MCNC: 7 G/DL (ref 6–8.5)
PROTHROMBIN TIME: 21.8 SEC (ref 9.1–12)
SL AMB EGFR AFRICAN AMERICAN: 106 ML/MIN/1.73
SL AMB EGFR NON AFRICAN AMERICAN: 92 ML/MIN/1.73
SL AMB VLDL CHOLESTEROL CALC: 35 MG/DL (ref 5–40)
SODIUM SERPL-SCNC: 142 MMOL/L (ref 134–144)
TRIGL SERPL-MCNC: 173 MG/DL (ref 0–149)

## 2019-10-12 DIAGNOSIS — F32.A DEPRESSION, UNSPECIFIED DEPRESSION TYPE: ICD-10-CM

## 2019-10-12 DIAGNOSIS — M51.26 LUMBAR HERNIATED DISC: ICD-10-CM

## 2019-10-12 DIAGNOSIS — I48.91 ATRIAL FIBRILLATION, UNSPECIFIED TYPE (HCC): ICD-10-CM

## 2019-10-12 DIAGNOSIS — E78.5 DYSLIPIDEMIA: ICD-10-CM

## 2019-10-12 RX ORDER — FLECAINIDE ACETATE 100 MG/1
TABLET ORAL
Qty: 180 TABLET | Refills: 3 | Status: SHIPPED | OUTPATIENT
Start: 2019-10-12 | End: 2020-05-21 | Stop reason: SDUPTHER

## 2019-10-14 RX ORDER — DULOXETIN HYDROCHLORIDE 60 MG/1
CAPSULE, DELAYED RELEASE ORAL
Qty: 90 CAPSULE | Refills: 0 | Status: SHIPPED | OUTPATIENT
Start: 2019-10-14 | End: 2019-12-19 | Stop reason: SDUPTHER

## 2019-10-14 RX ORDER — PRAVASTATIN SODIUM 40 MG
TABLET ORAL
Qty: 90 TABLET | Refills: 1 | Status: SHIPPED | OUTPATIENT
Start: 2019-10-14 | End: 2019-10-24 | Stop reason: SDUPTHER

## 2019-10-14 RX ORDER — METOPROLOL SUCCINATE 100 MG/1
TABLET, EXTENDED RELEASE ORAL DAILY
Qty: 90 TABLET | Refills: 1 | Status: SHIPPED | OUTPATIENT
Start: 2019-10-14 | End: 2019-10-24 | Stop reason: SDUPTHER

## 2019-10-14 RX ORDER — CELECOXIB 200 MG/1
CAPSULE ORAL
Qty: 90 CAPSULE | Refills: 1 | Status: SHIPPED | OUTPATIENT
Start: 2019-10-14 | End: 2019-10-24 | Stop reason: SDUPTHER

## 2019-10-18 ENCOUNTER — OFFICE VISIT (OUTPATIENT)
Dept: CARDIOLOGY CLINIC | Facility: CLINIC | Age: 69
End: 2019-10-18
Payer: COMMERCIAL

## 2019-10-18 VITALS
HEIGHT: 72 IN | WEIGHT: 283 LBS | SYSTOLIC BLOOD PRESSURE: 142 MMHG | DIASTOLIC BLOOD PRESSURE: 82 MMHG | BODY MASS INDEX: 38.33 KG/M2 | HEART RATE: 55 BPM

## 2019-10-18 DIAGNOSIS — I10 ESSENTIAL HYPERTENSION: ICD-10-CM

## 2019-10-18 DIAGNOSIS — G47.33 OSA (OBSTRUCTIVE SLEEP APNEA): ICD-10-CM

## 2019-10-18 DIAGNOSIS — D68.51 FACTOR V LEIDEN MUTATION (HCC): ICD-10-CM

## 2019-10-18 DIAGNOSIS — I48.3 TYPICAL ATRIAL FLUTTER (HCC): ICD-10-CM

## 2019-10-18 DIAGNOSIS — E78.5 DYSLIPIDEMIA: ICD-10-CM

## 2019-10-18 DIAGNOSIS — I48.0 PAROXYSMAL ATRIAL FIBRILLATION (HCC): Primary | ICD-10-CM

## 2019-10-18 PROCEDURE — 99214 OFFICE O/P EST MOD 30 MIN: CPT | Performed by: INTERNAL MEDICINE

## 2019-10-18 PROCEDURE — 93000 ELECTROCARDIOGRAM COMPLETE: CPT | Performed by: INTERNAL MEDICINE

## 2019-10-18 NOTE — PROGRESS NOTES
Cardiology Follow Up    Sophy Beard  1950  [id number]  HEART & VASCULAR  [facility]  [address]  [address] [address] [address]    1  Paroxysmal atrial fibrillation (HCC)  POCT ECG   2  Typical atrial flutter ([address] )     3  Essential hypertension     4  WILL (obstructive sleep apnea)     5  Dyslipidemia     6  Factor V Leiden mutation ([facility] [address] )         Interval History:     Beatriz Padron returns for followup given his history of paroxysmal atrial fibrillation  This was diagnosed at the time of a colonoscopy, performed by Dr Gisela Be a few years ago  After starting metoprolol, in follow-up he was back to sinus rhythm  Then in January 2015 Beatriz Padron arrived in atrial flutter with 2:1 conduction  He however has no symptoms other than is ongoing fatigue  At that time I set him up for a cardioversion that next week  He converted without any problems  He then met Dr Carly López of our EP service  He then came back a week later and had his atrial flutter ablated  He was in sinus rhythm up until a few appointments ago in which he was found to be back in atrial fibrillation, with controlled rates  He has been on Coumadin for years given his history of a DVT and being found to have factor 5 Leiden mutation-homozygous  When we 1st met him, he also went for a sleep study, and was found to have severe obstructive sleep apnea  He however did not tolerate the CPAP mask due to claustrophobia  I encouraged him to seek consultation with sleep medicine, and for a while he did not do this  However after last year he did do this  He then had proper mask fitting, and now is on CPAP  He has been compliant on this since  Of few visits ago Beatriz Padron remained in atrial fibrillation, and did complain of some fatigue and some shortness of breath /diaphoresis on exertion  He overall felt well, but we discussed a rhythm control strategy    He we set him up for a cardioversion which was only transiently successful  Within the month he was back in atrial fibrillation  We had him undergo stress nuclear study which was normal   At that point the thought was another cardioversion, and then start flecainide  When he arrived for his cardioversion in December he converted on his own back to sinus rhythm  He remains in sinus rhythm today on flecainide and metoprolol  Emmet Cockayne tells me he feels well  He is asymptomatic from a cardiac standpoint  It is difficult to tell if he feels any different in sinus rhythm  He denies any lightheadedness or syncope  He denies any symptoms of congestive heart failure  He tells me that since starting CPAP overall he does feel like he has more energy  He denies any shortness of breath or chest pain  He does jimenez with some on and off fatigue         Past Medical History:   Diagnosis Date    Acute venous embolism and thrombosis of deep vessels of proximal lower extremity (HCC)     Last assessed: 5/18/15    Paroxysmal atrial fibrillation (HCC)     Last assessed: 11/2/15     Social History     Socioeconomic History    Marital status: /Civil Union     Spouse name: Not on file    Number of children: Not on file    Years of education: Not on file    Highest education level: Not on file   Occupational History    Not on file   Social Needs    Financial resource strain: Not on file    Food insecurity:     Worry: Not on file     Inability: Not on file    Transportation needs:     Medical: Not on file     Non-medical: Not on file   Tobacco Use    Smoking status: Never Smoker    Smokeless tobacco: Never Used   Substance and Sexual Activity    Alcohol use: No    Drug use: No    Sexual activity: Not on file   Lifestyle    Physical activity:     Days per week: Not on file     Minutes per session: Not on file    Stress: Not on file   Relationships    Social connections:     Talks on phone: Not on file     Gets together: Not on file     Attends Yarsani service: Not on file     Active member of club or organization: Not on file     Attends meetings of clubs or organizations: Not on file     Relationship status: Not on file    Intimate partner violence:     Fear of current or ex partner: Not on file     Emotionally abused: Not on file     Physically abused: Not on file     Forced sexual activity: Not on file   Other Topics Concern    Not on file   Social History Narrative    Caffeine use: 2 cups coffee a day      Family History   Problem Relation Age of Onset    Lung cancer Mother     No Known Problems Father     Heart attack Brother     Atrial fibrillation Brother      Past Surgical History:   Procedure Laterality Date    BACK SURGERY      Lower    COLON SURGERY         Current Outpatient Medications:     celecoxib (CeleBREX) 200 mg capsule, TAKE 1 CAPSULE BY MOUTH  DAILY, Disp: 90 capsule, Rfl: 1    DULoxetine (CYMBALTA) 60 mg delayed release capsule, TAKE 1 CAPSULE BY MOUTH  DAILY, Disp: 90 capsule, Rfl: 0    flecainide (TAMBOCOR) 100 mg tablet, TAKE 1 TABLET BY MOUTH TWO  TIMES DAILY, Disp: 180 tablet, Rfl: 3    metoprolol succinate (TOPROL-XL) 100 mg 24 hr tablet, TAKE 1 TABLET BY MOUTH  DAILY, Disp: 90 tablet, Rfl: 1    pravastatin (PRAVACHOL) 40 mg tablet, TAKE 1 TABLET BY MOUTH  DAILY, Disp: 90 tablet, Rfl: 1    sildenafil (VIAGRA) 100 mg tablet, Take 1 tablet (100 mg total) by mouth daily as needed for erectile dysfunction, Disp: 10 tablet, Rfl: 3    warfarin (COUMADIN) 5 mg tablet, TAKE 1/2 TO 1 TABLET BY  MOUTH DAILY BASED ON PT/INR RESULTS, Disp: 90 tablet, Rfl: 0    zolpidem (AMBIEN) 10 mg tablet, Take 1 tablet (10 mg total) by mouth daily at bedtime as needed for sleep, Disp: 30 tablet, Rfl: 3  Allergies   Allergen Reactions    Morphine Nausea Only       Labs:  Lab Results   Component Value Date     10/25/2017    K 4 2 10/10/2019     10/10/2019    CO2 26 10/10/2019    BUN 14 10/10/2019    CREATININE 0 80 10/10/2019 CREATININE 1 24 10/25/2017    GLUCOSE 102 (H) 10/25/2017    CALCIUM 9 6 10/25/2017     Lab Results   Component Value Date    WBC 8 43 01/23/2015    HGB 12 9 01/23/2015    HCT 39 5 01/23/2015    MCV 93 01/23/2015     01/23/2015     Lab Results   Component Value Date    CHOL 151 10/25/2017    TRIG 173 (H) 10/10/2019    HDL 54 10/10/2019     Imaging:   ECG obtained today demonstrates sinus bradycardia with nonspecific IVCD  Review of Systems:  Review of Systems   Constitutional: Positive for fatigue  HENT: Negative  Eyes: Negative  Cardiovascular: Negative  Gastrointestinal: Negative  Musculoskeletal: Negative  Skin: Negative  Allergic/Immunologic: Negative  Neurological: Negative  Hematological: Negative  Psychiatric/Behavioral: Negative  All other systems reviewed and are negative  Vitals:    10/18/19 0845   BP: 142/82   Pulse: 55     Physical Exam:  Physical Exam   Constitutional: He is oriented to person, place, and time  He appears well-developed and well-nourished  HENT:   Head: Normocephalic and atraumatic  Eyes: Pupils are equal, round, and reactive to light  EOM are normal  Right eye exhibits no discharge  Left eye exhibits no discharge  No scleral icterus  Neck: Normal range of motion  Neck supple  No JVD present  No thyromegaly present  Cardiovascular: Normal rate, regular rhythm, S1 normal, S2 normal, intact distal pulses and normal pulses  Exam reveals distant heart sounds  Exam reveals no gallop and no friction rub  No murmur heard  Pulmonary/Chest: Effort normal and breath sounds normal  No respiratory distress  He has no wheezes  He has no rales  Abdominal: Soft  Bowel sounds are normal  He exhibits no distension  There is no tenderness  Musculoskeletal: Normal range of motion  He exhibits no edema, tenderness or deformity  Neurological: He is alert and oriented to person, place, and time  No cranial nerve deficit     Skin: Skin is warm and dry  No rash noted  Psychiatric: He has a normal mood and affect  Judgment and thought content normal    Nursing note and vitals reviewed  Discussion/Summary:    1  PAF - [place] Yennifer remains in sinus rhythm  We will continue the same dose of metoprolol and flecainide  We will follow in ECG each visit  He is on Coumadin for stroke prevention given his history of factor 5 Leiden mutation  No changes made today  We will see him back in 6 months  2  Obstructive sleep apnea -  He is now on and tolerating CPAP  It is encouraged for him to remain compliant on this  3   Hypertension - His blood pressure is mildly elevated today, but runs normal on other occasions     No changes were made  He should regularly check his blood pressure at home  4   Dyslipidemia - His LDL significantly increased on his most recent blood work  He has been compliant on pravastatin, this is likely related to dietary indiscretions  We went over this today  He is going to try to changes diet somewhat, we will recheck this after our next visit  He did not tolerate simvastatin in the past, but we could try 1 of the other agents  Counseling / Coordination of Care  Total floor / unit time spent today 25 minutes  Greater than 50% of total time was spent with the patient and / or family counseling and / or coordination of care

## 2019-10-24 ENCOUNTER — OFFICE VISIT (OUTPATIENT)
Dept: FAMILY MEDICINE CLINIC | Facility: CLINIC | Age: 69
End: 2019-10-24
Payer: COMMERCIAL

## 2019-10-24 VITALS
DIASTOLIC BLOOD PRESSURE: 84 MMHG | SYSTOLIC BLOOD PRESSURE: 138 MMHG | WEIGHT: 280.87 LBS | HEIGHT: 72 IN | BODY MASS INDEX: 38.04 KG/M2 | HEART RATE: 73 BPM | TEMPERATURE: 97.2 F | OXYGEN SATURATION: 98 %

## 2019-10-24 DIAGNOSIS — E78.5 DYSLIPIDEMIA: ICD-10-CM

## 2019-10-24 DIAGNOSIS — Z12.12 ENCOUNTER FOR SCREENING FOR MALIGNANT NEOPLASM OF RECTUM: ICD-10-CM

## 2019-10-24 DIAGNOSIS — Z00.00 MEDICARE ANNUAL WELLNESS VISIT, SUBSEQUENT: Primary | ICD-10-CM

## 2019-10-24 DIAGNOSIS — I10 ESSENTIAL HYPERTENSION: ICD-10-CM

## 2019-10-24 DIAGNOSIS — G47.00 INSOMNIA, UNSPECIFIED TYPE: ICD-10-CM

## 2019-10-24 DIAGNOSIS — D68.51 FACTOR V LEIDEN MUTATION (HCC): ICD-10-CM

## 2019-10-24 DIAGNOSIS — F32.1 DEPRESSION, MAJOR, SINGLE EPISODE, MODERATE (HCC): ICD-10-CM

## 2019-10-24 DIAGNOSIS — Z12.11 ENCOUNTER FOR SCREENING FOR MALIGNANT NEOPLASM OF COLON: Primary | ICD-10-CM

## 2019-10-24 DIAGNOSIS — Z23 NEED FOR VACCINATION: ICD-10-CM

## 2019-10-24 DIAGNOSIS — I48.91 ATRIAL FIBRILLATION, UNSPECIFIED TYPE (HCC): ICD-10-CM

## 2019-10-24 DIAGNOSIS — M51.26 LUMBAR HERNIATED DISC: ICD-10-CM

## 2019-10-24 PROCEDURE — G0439 PPPS, SUBSEQ VISIT: HCPCS | Performed by: FAMILY MEDICINE

## 2019-10-24 PROCEDURE — 90662 IIV NO PRSV INCREASED AG IM: CPT

## 2019-10-24 PROCEDURE — G0008 ADMIN INFLUENZA VIRUS VAC: HCPCS

## 2019-10-24 PROCEDURE — 1170F FXNL STATUS ASSESSED: CPT

## 2019-10-24 PROCEDURE — 3079F DIAST BP 80-89 MM HG: CPT | Performed by: FAMILY MEDICINE

## 2019-10-24 PROCEDURE — 1125F AMNT PAIN NOTED PAIN PRSNT: CPT

## 2019-10-24 PROCEDURE — 3075F SYST BP GE 130 - 139MM HG: CPT | Performed by: FAMILY MEDICINE

## 2019-10-24 PROCEDURE — 99214 OFFICE O/P EST MOD 30 MIN: CPT | Performed by: FAMILY MEDICINE

## 2019-10-24 RX ORDER — PRAVASTATIN SODIUM 40 MG
40 TABLET ORAL DAILY
Qty: 90 TABLET | Refills: 1 | Status: SHIPPED | OUTPATIENT
Start: 2019-10-24 | End: 2020-04-18

## 2019-10-24 RX ORDER — METOPROLOL SUCCINATE 100 MG/1
100 TABLET, EXTENDED RELEASE ORAL DAILY
Qty: 90 TABLET | Refills: 1 | Status: SHIPPED | OUTPATIENT
Start: 2019-10-24 | End: 2020-04-18

## 2019-10-24 RX ORDER — CELECOXIB 200 MG/1
200 CAPSULE ORAL DAILY
Qty: 90 CAPSULE | Refills: 1 | Status: SHIPPED | OUTPATIENT
Start: 2019-10-24 | End: 2020-04-20

## 2019-10-24 RX ORDER — WARFARIN SODIUM 5 MG/1
5 TABLET ORAL
Qty: 90 TABLET | Refills: 1 | Status: SHIPPED | OUTPATIENT
Start: 2019-10-24 | End: 2020-05-21 | Stop reason: SDUPTHER

## 2019-10-24 RX ORDER — ZOLPIDEM TARTRATE 10 MG/1
10 TABLET ORAL
Qty: 90 TABLET | Refills: 3 | Status: SHIPPED | OUTPATIENT
Start: 2019-10-24 | End: 2019-11-27

## 2019-10-24 NOTE — PROGRESS NOTES
Assessment and Plan:     Problem List Items Addressed This Visit     None        BMI Counseling: Body mass index is 38 09 kg/m²  The BMI is above normal  Nutrition recommendations include decreasing portion sizes, encouraging healthy choices of fruits and vegetables, moderation in carbohydrate intake and reducing intake of cholesterol  Exercise recommendations include exercising 3-5 times per week  No pharmacotherapy was ordered  Preventive health issues were discussed with patient, and age appropriate screening tests were ordered as noted in patient's After Visit Summary  Personalized health advice and appropriate referrals for health education or preventive services given if needed, as noted in patient's After Visit Summary       History of Present Illness:     Patient presents for Medicare Annual Wellness visit    Patient Care Team:  Alisa Peters MD as PCP - General  DO Divya Ying Ala, MD Leann Eans, MD     Problem List:     Patient Active Problem List   Diagnosis    Atrial fibrillation [facility])    Atrial flutter ([facility] )    Status post catheter ablation of atrial flutter    Dyslipidemia    Essential hypertension    WILL (obstructive sleep apnea)    Factor V Leiden mutation ([place] [address] )    Primary osteoarthritis of both knees    Erectile dysfunction    Depression, major, single episode, moderate ([facility] )    Insomnia    Lumbar herniated disc      Past Medical and Surgical History:     Past Medical History:   Diagnosis Date    Acute venous embolism and thrombosis of deep vessels of proximal lower extremity ([facility] )     Last assessed: 5/18/15    Paroxysmal atrial fibrillation ([facility] )     Last assessed: 11/2/15     Past Surgical History:   Procedure Laterality Date    BACK SURGERY      Lower    COLON SURGERY        Family History:     Family History   Problem Relation Age of Onset    Lung cancer Mother     No Known Problems Father     Heart attack Brother     Atrial fibrillation Brother Social History:     Social History     Socioeconomic History    Marital status: /Civil Union     Spouse name: None    Number of children: None    Years of education: None    Highest education level: None   Occupational History    None   Social Needs    Financial resource strain: None    Food insecurity:     Worry: None     Inability: None    Transportation needs:     Medical: None     Non-medical: None   Tobacco Use    Smoking status: Never Smoker    Smokeless tobacco: Never Used   Substance and Sexual Activity    Alcohol use: No    Drug use: No    Sexual activity: None   Lifestyle    Physical activity:     Days per week: None     Minutes per session: None    Stress: None   Relationships    Social connections:     Talks on phone: None     Gets together: None     Attends Spiritism service: None     Active member of club or organization: None     Attends meetings of clubs or organizations: None     Relationship status: None    Intimate partner violence:     Fear of current or ex partner: None     Emotionally abused: None     Physically abused: None     Forced sexual activity: None   Other Topics Concern    None   Social History Narrative    Caffeine use: 2 cups coffee a day       Medications and Allergies:     Current Outpatient Medications   Medication Sig Dispense Refill    celecoxib (CeleBREX) 200 mg capsule TAKE 1 CAPSULE BY MOUTH  DAILY 90 capsule 1    DULoxetine (CYMBALTA) 60 mg delayed release capsule TAKE 1 CAPSULE BY MOUTH  DAILY 90 capsule 0    flecainide (TAMBOCOR) 100 mg tablet TAKE 1 TABLET BY MOUTH TWO  TIMES DAILY 180 tablet 3    metoprolol succinate (TOPROL-XL) 100 mg 24 hr tablet TAKE 1 TABLET BY MOUTH  DAILY 90 tablet 1    pravastatin (PRAVACHOL) 40 mg tablet TAKE 1 TABLET BY MOUTH  DAILY 90 tablet 1    sildenafil (VIAGRA) 100 mg tablet Take 1 tablet (100 mg total) by mouth daily as needed for erectile dysfunction 10 tablet 3    warfarin (COUMADIN) 5 mg tablet TAKE 1/2 TO 1 TABLET BY  MOUTH DAILY BASED ON PT/INR RESULTS 90 tablet 0    zolpidem (AMBIEN) 10 mg tablet Take 1 tablet (10 mg total) by mouth daily at bedtime as needed for sleep 30 tablet 3     No current facility-administered medications for this visit  Allergies   Allergen Reactions    Morphine Nausea Only      Immunizations:     Immunization History   Administered Date(s) Administered    INFLUENZA 10/08/2014    Influenza Quadrivalent Preservative Free 3 years and older IM 10/08/2015, 11/03/2016    Influenza Split High Dose Preservative Free IM 10/30/2017    Influenza, high dose seasonal 0 5 mL 09/13/2018    Pneumococcal Conjugate 13-Valent 02/09/2016    Pneumococcal Polysaccharide PPV23 04/24/2017    Tdap 03/10/2014      Health Maintenance:         Topic Date Due    CRC Screening: Colonoscopy  02/06/2017    Hepatitis C Screening  Completed         Topic Date Due    INFLUENZA VACCINE  07/01/2019      Medicare Health Risk Assessment:     Temp (!) 97 2 °F (36 2 °C) (Tympanic)   Ht 6' (1 829 m)   BMI 38 38 kg/m²      Vicky Castillo is here for his Subsequent Wellness visit  Last Medicare Wellness visit information reviewed, patient interviewed, no change since last AWV  Health Risk Assessment:   Patient rates overall health as good  Patient feels that their physical health rating is slightly worse  Eyesight was rated as slightly worse  Hearing was rated as slightly worse  Patient feels that their emotional and mental health rating is same  Pain experienced in the last 7 days has been a lot  Patient's pain rating has been 9/10  Patient states that he has experienced no weight loss or gain in last 6 months  Depression Screening:   PHQ-2 Score: 0      Fall Risk Screening: In the past year, patient has experienced: no history of falling in past year      Home Safety:  Patient has trouble with stairs inside or outside of their home  Patient has working smoke alarms and has no working carbon monoxide detector  Home safety hazards include: none  Nutrition:   Current diet is Regular  Medications:   Patient is not currently taking any over-the-counter supplements  Patient is able to manage medications  Activities of Daily Living (ADLs)/Instrumental Activities of Daily Living (IADLs):   Walk and transfer into and out of bed and chair?: Yes  Dress and groom yourself?: Yes    Bathe or shower yourself?: Yes    Feed yourself? Yes  Do your laundry/housekeeping?: Yes  Manage your money, pay your bills and track your expenses?: Yes  Make your own meals?: Yes    Do your own shopping?: Yes    Previous Hospitalizations:   Any hospitalizations or ED visits within the last 12 months?: No      Advance Care Planning:   Living will: Yes    Durable POA for healthcare: Yes    Provider agrees with end of life decisions: Yes      Cognitive Screening:   Provider or family/friend/caregiver concerned regarding cognition?: No    PREVENTIVE SCREENINGS      Cardiovascular Screening:    General: Screening Current      Diabetes Screening:     General: Screening Current      Colorectal Cancer Screening:     General: Screening Current      Osteoporosis Screening:    General: Screening Not Indicated      Abdominal Aortic Aneurysm (AAA) Screening:    Risk factors include: age between 73-67 yo        Lung Cancer Screening:     General: Screening Not Indicated      Hepatitis C Screening:    General: Screening Current    Other Counseling Topics:   Car/seat belt/driving safety         Kristen Barfield MD

## 2019-10-24 NOTE — PROGRESS NOTES
Subjective:   Chief Complaint   Patient presents with    Medicare Wellness Visit     wellness        Patient ID: Quincy White is a 77 y o  male  Medical management multiple issues-  1) hypertension-adequate control with current medications  Does monitor home usually less than 140/80   2) hyperlipidemia-levels are slightly elevated today  Did also discuss this with Cardiology  He is reluctant to increase his dose  3) atrial fibrillation-post ablation-remains in sinus rhythm  Remains on beta-blocker  4) factor 5 Leiden mutation-remains on long-term Coumadin  5) low back pain  Patient on Celebrex  No stomach upset ordered GI bleeding noted  5) depression-stable with current medications  The following portions of the patient's history were reviewed and updated as appropriate: allergies, current medications, past family history, past medical history, past social history, past surgical history and problem list     Review of Systems   Constitutional: Negative for activity change, appetite change, diaphoresis and fatigue  Respiratory: Negative for cough, chest tightness, shortness of breath and wheezing  Cardiovascular: Negative for chest pain, palpitations and leg swelling  Fast or slow heart rate   Gastrointestinal: Negative for abdominal pain, blood in stool, constipation, diarrhea, nausea and vomiting  Genitourinary: Negative for difficulty urinating, dysuria and frequency  Musculoskeletal: Positive for arthralgias and back pain  Negative for gait problem, joint swelling and myalgias  Neurological: Negative for dizziness, light-headedness and headaches  Psychiatric/Behavioral: Negative for agitation, confusion, dysphoric mood, sleep disturbance and suicidal ideas  The patient is not nervous/anxious            Objective:  Vitals:    10/24/19 1401   BP: 138/84   BP Location: Left arm   Patient Position: Sitting   Cuff Size: Large   Pulse: 73   Temp: (!) 97 2 °F (36 2 °C) TempSrc: Tympanic   SpO2: 98%   Weight: 127 kg (280 lb 13 9 oz)   Height: 6' (1 829 m)      Physical Exam   Constitutional: He appears well-developed and well-nourished  No distress  HENT:   Head: Normocephalic and atraumatic  Right Ear: Tympanic membrane and external ear normal  No drainage  Left Ear: Tympanic membrane normal  No drainage  Mouth/Throat: No oropharyngeal exudate  Eyes: Conjunctivae and EOM are normal  Right eye exhibits no discharge  Left eye exhibits no discharge  Neck: Normal range of motion  Neck supple  No thyromegaly present  Cardiovascular: Normal rate, regular rhythm and normal heart sounds  Pulmonary/Chest: Effort normal  No respiratory distress  He has no wheezes  He has no rales  Musculoskeletal:        Lumbar back: He exhibits decreased range of motion, tenderness and bony tenderness  He exhibits no spasm  Lymphadenopathy:     He has no cervical adenopathy  Skin: Skin is warm and dry  Assessment/Plan:    No problem-specific Assessment & Plan notes found for this encounter  Diagnoses and all orders for this visit:    Medicare annual wellness visit, subsequent    Need for vaccination  -     influenza vaccine, [id number], high-dose, PF 0 5 mL (FLUZONE HIGH-DOSE)    Essential hypertension  -     metoprolol succinate (TOPROL-XL) 100 mg 24 hr tablet; Take 1 tablet (100 mg total) by mouth daily    Dyslipidemia  -     pravastatin (PRAVACHOL) 40 mg tablet; Take 1 tablet (40 mg total) by mouth daily    Factor V Leiden mutation (HCC)  -     warfarin (COUMADIN) 5 mg tablet; Take 1 tablet (5 mg total) by mouth once for 1 dose    Primary osteoarthritis of both knees    Insomnia, unspecified type  -     zolpidem (AMBIEN) 10 mg tablet; Take 1 tablet (10 mg total) by mouth daily at bedtime as needed for sleep    Atrial fibrillation, unspecified type (HCC)  -     metoprolol succinate (TOPROL-XL) 100 mg 24 hr tablet;  Take 1 tablet (100 mg total) by mouth daily    Depression, unspecified depression type    Lumbar herniated disc  -     celecoxib (CeleBREX) 200 mg capsule; Take 1 capsule (200 mg total) by mouth daily    Depression, major, single episode, moderate (HCC)          Medical management-continue current medications  Improve her diet on the cholesterol  I would recheck this in 6 months because it is elevated  Cut down on the total carbs, the refined sugars and alcohol  Follow-up with cardiologist   Patricia Hsieh have declined shingles vaccines  You have agreed to do  ColoGuard which will have sent to the house  This would be in lieu of colonoscopy which is probably the best thing for you to have

## 2019-11-06 ENCOUNTER — ANTICOAG VISIT (OUTPATIENT)
Dept: FAMILY MEDICINE CLINIC | Facility: CLINIC | Age: 69
End: 2019-11-06

## 2019-11-06 DIAGNOSIS — Z79.01 CURRENT USE OF LONG TERM ANTICOAGULATION: ICD-10-CM

## 2019-11-06 DIAGNOSIS — D68.51 FACTOR V LEIDEN MUTATION (HCC): Primary | ICD-10-CM

## 2019-11-06 LAB
INR PPP: 2.4 (ref 0.8–1.2)
PROTHROMBIN TIME: 23.2 SEC (ref 9.1–12)

## 2019-11-06 NOTE — PROGRESS NOTES
Notes recorded by Alexandra Sawyer MD on 11/6/2019 at 7:39 AM EST  Call patient with lab result-  same

## 2019-11-26 ENCOUNTER — TELEPHONE (OUTPATIENT)
Dept: FAMILY MEDICINE CLINIC | Facility: CLINIC | Age: 69
End: 2019-11-26

## 2019-11-27 ENCOUNTER — ANTICOAG VISIT (OUTPATIENT)
Dept: FAMILY MEDICINE CLINIC | Facility: CLINIC | Age: 69
End: 2019-11-27

## 2019-11-27 DIAGNOSIS — Z79.01 CURRENT USE OF LONG TERM ANTICOAGULATION: ICD-10-CM

## 2019-11-27 DIAGNOSIS — D68.51 FACTOR V LEIDEN MUTATION (HCC): Primary | ICD-10-CM

## 2019-11-27 DIAGNOSIS — G47.00 INSOMNIA, UNSPECIFIED TYPE: Primary | ICD-10-CM

## 2019-11-27 LAB
INR PPP: 2.7 (ref 0.8–1.2)
PROTHROMBIN TIME: 25.5 SEC (ref 9.1–12)

## 2019-11-27 RX ORDER — ZOLPIDEM TARTRATE 10 MG/1
10 TABLET ORAL
Qty: 30 TABLET | Refills: 5 | Status: SHIPPED | OUTPATIENT
Start: 2019-11-27 | End: 2019-12-03 | Stop reason: SDUPTHER

## 2019-11-27 NOTE — PROGRESS NOTES
Notes recorded by Richar Cheng MD on 11/27/2019 at 7:34 AM EST  Call patient with lab result-continue same    6 INR orders placed for future draws

## 2019-11-27 NOTE — TELEPHONE ENCOUNTER
Call from patient -- states Dr Sanju Phillips had given him an Rx for Ambien 10mg #90 with # refills, however his insurance will not cover it  He is requesting a WRITTEN Rx for #30 5 RF to take to a local pharmacy where he can use a coupon      [facility] reviewed -- Ambien last filled 3/15/19 for # 30    Rx set up and sent to Dr Sanju Phillips for signature to be printed for patient pickup

## 2019-12-03 DIAGNOSIS — G47.00 INSOMNIA, UNSPECIFIED TYPE: ICD-10-CM

## 2019-12-03 RX ORDER — ZOLPIDEM TARTRATE 10 MG/1
10 TABLET ORAL
Qty: 30 TABLET | Refills: 2 | Status: SHIPPED | OUTPATIENT
Start: 2019-12-03 | End: 2020-05-21 | Stop reason: SDUPTHER

## 2019-12-18 ENCOUNTER — ANTICOAG VISIT (OUTPATIENT)
Dept: FAMILY MEDICINE CLINIC | Facility: CLINIC | Age: 69
End: 2019-12-18

## 2019-12-18 LAB
INR PPP: 2.1 (ref 0.8–1.2)
PROTHROMBIN TIME: 20.1 SEC (ref 9.1–12)

## 2019-12-19 DIAGNOSIS — F32.A DEPRESSION, UNSPECIFIED DEPRESSION TYPE: ICD-10-CM

## 2019-12-19 RX ORDER — DULOXETIN HYDROCHLORIDE 60 MG/1
CAPSULE, DELAYED RELEASE ORAL
Qty: 90 CAPSULE | Refills: 0 | Status: SHIPPED | OUTPATIENT
Start: 2019-12-19 | End: 2020-04-21

## 2019-12-30 ENCOUNTER — TELEPHONE (OUTPATIENT)
Dept: FAMILY MEDICINE CLINIC | Facility: CLINIC | Age: 69
End: 2019-12-30

## 2019-12-30 NOTE — TELEPHONE ENCOUNTER
----- Message from Cynthia Portillo MD sent at 12/30/2019 11:16 AM EST -----  Call patient with lab result-colo guard negative, repeat 3 years

## 2020-01-03 LAB
INR PPP: 2.5 (ref 0.8–1.2)
PROTHROMBIN TIME: 23.5 SEC (ref 9.1–12)

## 2020-01-06 ENCOUNTER — ANTICOAG VISIT (OUTPATIENT)
Dept: FAMILY MEDICINE CLINIC | Facility: CLINIC | Age: 70
End: 2020-01-06

## 2020-01-29 ENCOUNTER — ANTICOAG VISIT (OUTPATIENT)
Dept: FAMILY MEDICINE CLINIC | Facility: CLINIC | Age: 70
End: 2020-01-29

## 2020-01-29 ENCOUNTER — TELEPHONE (OUTPATIENT)
Dept: FAMILY MEDICINE CLINIC | Facility: CLINIC | Age: 70
End: 2020-01-29

## 2020-01-29 LAB
INR PPP: 2.6 (ref 0.8–1.2)
PROTHROMBIN TIME: 24.4 SEC (ref 9.1–12)

## 2020-02-11 ENCOUNTER — TELEPHONE (OUTPATIENT)
Dept: FAMILY MEDICINE CLINIC | Facility: CLINIC | Age: 70
End: 2020-02-11

## 2020-02-26 ENCOUNTER — ANTICOAG VISIT (OUTPATIENT)
Dept: FAMILY MEDICINE CLINIC | Facility: CLINIC | Age: 70
End: 2020-02-26

## 2020-02-26 LAB
INR PPP: 3.1 (ref 0.8–1.2)
PROTHROMBIN TIME: 29.1 SEC (ref 9.1–12)

## 2020-04-16 ENCOUNTER — ANTICOAG VISIT (OUTPATIENT)
Dept: FAMILY MEDICINE CLINIC | Facility: CLINIC | Age: 70
End: 2020-04-16

## 2020-04-16 LAB
INR PPP: 2.1 (ref 0.8–1.2)
PROTHROMBIN TIME: 20.6 SEC (ref 9.1–12)

## 2020-04-18 DIAGNOSIS — I48.91 ATRIAL FIBRILLATION, UNSPECIFIED TYPE (HCC): ICD-10-CM

## 2020-04-18 DIAGNOSIS — F32.A DEPRESSION, UNSPECIFIED DEPRESSION TYPE: ICD-10-CM

## 2020-04-18 DIAGNOSIS — I10 ESSENTIAL HYPERTENSION: ICD-10-CM

## 2020-04-18 DIAGNOSIS — M51.26 LUMBAR HERNIATED DISC: ICD-10-CM

## 2020-04-18 DIAGNOSIS — E78.5 DYSLIPIDEMIA: ICD-10-CM

## 2020-04-18 RX ORDER — METOPROLOL SUCCINATE 100 MG/1
TABLET, EXTENDED RELEASE ORAL
Qty: 90 TABLET | Refills: 1 | Status: SHIPPED | OUTPATIENT
Start: 2020-04-18 | End: 2020-09-28

## 2020-04-18 RX ORDER — PRAVASTATIN SODIUM 40 MG
TABLET ORAL
Qty: 90 TABLET | Refills: 1 | Status: SHIPPED | OUTPATIENT
Start: 2020-04-18 | End: 2020-05-21 | Stop reason: SDUPTHER

## 2020-04-20 DIAGNOSIS — E78.00 ELEVATED CHOLESTEROL: Primary | ICD-10-CM

## 2020-04-21 RX ORDER — DULOXETIN HYDROCHLORIDE 60 MG/1
CAPSULE, DELAYED RELEASE ORAL
Qty: 90 CAPSULE | Refills: 0 | Status: SHIPPED | OUTPATIENT
Start: 2020-04-21 | End: 2020-05-21 | Stop reason: SDUPTHER

## 2020-04-21 RX ORDER — CELECOXIB 200 MG/1
CAPSULE ORAL
Qty: 90 CAPSULE | Refills: 0 | Status: SHIPPED | OUTPATIENT
Start: 2020-04-21 | End: 2020-05-21 | Stop reason: SDUPTHER

## 2020-05-21 DIAGNOSIS — G47.00 INSOMNIA, UNSPECIFIED TYPE: ICD-10-CM

## 2020-05-21 DIAGNOSIS — E78.5 DYSLIPIDEMIA: ICD-10-CM

## 2020-05-21 DIAGNOSIS — M51.26 LUMBAR HERNIATED DISC: ICD-10-CM

## 2020-05-21 DIAGNOSIS — I10 ESSENTIAL HYPERTENSION: ICD-10-CM

## 2020-05-21 DIAGNOSIS — D68.51 FACTOR V LEIDEN MUTATION (HCC): ICD-10-CM

## 2020-05-21 DIAGNOSIS — I48.91 ATRIAL FIBRILLATION, UNSPECIFIED TYPE (HCC): ICD-10-CM

## 2020-05-21 DIAGNOSIS — F32.A DEPRESSION, UNSPECIFIED DEPRESSION TYPE: ICD-10-CM

## 2020-05-21 RX ORDER — ZOLPIDEM TARTRATE 10 MG/1
10 TABLET ORAL
Qty: 30 TABLET | Refills: 2 | Status: SHIPPED | OUTPATIENT
Start: 2020-05-21 | End: 2020-05-21 | Stop reason: SDUPTHER

## 2020-05-21 RX ORDER — ZOLPIDEM TARTRATE 10 MG/1
10 TABLET ORAL
Qty: 30 TABLET | Refills: 2 | Status: SHIPPED | OUTPATIENT
Start: 2020-05-21 | End: 2020-06-08 | Stop reason: SDUPTHER

## 2020-05-21 RX ORDER — CELECOXIB 200 MG/1
200 CAPSULE ORAL DAILY
Qty: 90 CAPSULE | Refills: 0 | Status: SHIPPED | OUTPATIENT
Start: 2020-05-21 | End: 2020-08-04 | Stop reason: SDUPTHER

## 2020-05-21 RX ORDER — FLECAINIDE ACETATE 100 MG/1
100 TABLET ORAL 2 TIMES DAILY
Qty: 180 TABLET | Refills: 1 | Status: SHIPPED | OUTPATIENT
Start: 2020-05-21 | End: 2020-09-27

## 2020-05-21 RX ORDER — PRAVASTATIN SODIUM 40 MG
40 TABLET ORAL DAILY
Qty: 90 TABLET | Refills: 1 | Status: SHIPPED | OUTPATIENT
Start: 2020-05-21 | End: 2020-09-27

## 2020-05-21 RX ORDER — WARFARIN SODIUM 5 MG/1
5 TABLET ORAL
Qty: 90 TABLET | Refills: 1 | Status: SHIPPED | OUTPATIENT
Start: 2020-05-21 | End: 2020-07-02

## 2020-05-21 RX ORDER — DULOXETIN HYDROCHLORIDE 60 MG/1
60 CAPSULE, DELAYED RELEASE ORAL DAILY
Qty: 90 CAPSULE | Refills: 0 | Status: SHIPPED | OUTPATIENT
Start: 2020-05-21 | End: 2020-08-04 | Stop reason: SDUPTHER

## 2020-05-28 DIAGNOSIS — G47.00 INSOMNIA, UNSPECIFIED TYPE: ICD-10-CM

## 2020-05-28 RX ORDER — ZOLPIDEM TARTRATE 10 MG/1
TABLET ORAL
Qty: 90 TABLET | OUTPATIENT
Start: 2020-05-28

## 2020-05-29 ENCOUNTER — ANTICOAG VISIT (OUTPATIENT)
Dept: FAMILY MEDICINE CLINIC | Facility: CLINIC | Age: 70
End: 2020-05-29

## 2020-05-29 ENCOUNTER — TELEPHONE (OUTPATIENT)
Dept: FAMILY MEDICINE CLINIC | Facility: CLINIC | Age: 70
End: 2020-05-29

## 2020-05-29 LAB
CHOLEST SERPL-MCNC: 195 MG/DL (ref 100–199)
CHOLEST/HDLC SERPL: 4.4 RATIO (ref 0–5)
HDLC SERPL-MCNC: 44 MG/DL
INR PPP: 2.5 (ref 0.8–1.2)
LDLC SERPL CALC-MCNC: 103 MG/DL (ref 0–99)
PROTHROMBIN TIME: 23.9 SEC (ref 9.1–12)
SL AMB VLDL CHOLESTEROL CALC: 48 MG/DL (ref 5–40)
TRIGL SERPL-MCNC: 239 MG/DL (ref 0–149)

## 2020-06-02 ENCOUNTER — OFFICE VISIT (OUTPATIENT)
Dept: FAMILY MEDICINE CLINIC | Facility: CLINIC | Age: 70
End: 2020-06-02
Payer: COMMERCIAL

## 2020-06-02 VITALS
BODY MASS INDEX: 36.68 KG/M2 | HEIGHT: 71 IN | WEIGHT: 262 LBS | HEART RATE: 70 BPM | OXYGEN SATURATION: 96 % | SYSTOLIC BLOOD PRESSURE: 130 MMHG | DIASTOLIC BLOOD PRESSURE: 82 MMHG | TEMPERATURE: 98.2 F

## 2020-06-02 DIAGNOSIS — N40.1 BPH WITH OBSTRUCTION/LOWER URINARY TRACT SYMPTOMS: ICD-10-CM

## 2020-06-02 DIAGNOSIS — E78.5 DYSLIPIDEMIA: ICD-10-CM

## 2020-06-02 DIAGNOSIS — I10 ESSENTIAL HYPERTENSION: Primary | ICD-10-CM

## 2020-06-02 DIAGNOSIS — G47.33 OSA (OBSTRUCTIVE SLEEP APNEA): ICD-10-CM

## 2020-06-02 DIAGNOSIS — M17.0 PRIMARY OSTEOARTHRITIS OF BOTH KNEES: ICD-10-CM

## 2020-06-02 DIAGNOSIS — N13.8 BPH WITH OBSTRUCTION/LOWER URINARY TRACT SYMPTOMS: ICD-10-CM

## 2020-06-02 DIAGNOSIS — F32.1 DEPRESSION, MAJOR, SINGLE EPISODE, MODERATE (HCC): ICD-10-CM

## 2020-06-02 PROCEDURE — 4040F PNEUMOC VAC/ADMIN/RCVD: CPT | Performed by: FAMILY MEDICINE

## 2020-06-02 PROCEDURE — 3079F DIAST BP 80-89 MM HG: CPT | Performed by: FAMILY MEDICINE

## 2020-06-02 PROCEDURE — 99214 OFFICE O/P EST MOD 30 MIN: CPT | Performed by: FAMILY MEDICINE

## 2020-06-02 PROCEDURE — 1160F RVW MEDS BY RX/DR IN RCRD: CPT | Performed by: FAMILY MEDICINE

## 2020-06-02 PROCEDURE — 3008F BODY MASS INDEX DOCD: CPT | Performed by: FAMILY MEDICINE

## 2020-06-02 PROCEDURE — 3075F SYST BP GE 130 - 139MM HG: CPT | Performed by: FAMILY MEDICINE

## 2020-06-02 PROCEDURE — 1036F TOBACCO NON-USER: CPT | Performed by: FAMILY MEDICINE

## 2020-06-02 RX ORDER — TAMSULOSIN HYDROCHLORIDE 0.4 MG/1
0.4 CAPSULE ORAL
Qty: 30 CAPSULE | Refills: 2 | Status: SHIPPED | OUTPATIENT
Start: 2020-06-02 | End: 2020-08-27

## 2020-06-05 ENCOUNTER — OFFICE VISIT (OUTPATIENT)
Dept: OBGYN CLINIC | Facility: CLINIC | Age: 70
End: 2020-06-05
Payer: COMMERCIAL

## 2020-06-05 ENCOUNTER — APPOINTMENT (OUTPATIENT)
Dept: RADIOLOGY | Facility: CLINIC | Age: 70
End: 2020-06-05
Payer: COMMERCIAL

## 2020-06-05 VITALS
BODY MASS INDEX: 37.24 KG/M2 | WEIGHT: 266 LBS | HEIGHT: 71 IN | SYSTOLIC BLOOD PRESSURE: 120 MMHG | TEMPERATURE: 97.5 F | DIASTOLIC BLOOD PRESSURE: 78 MMHG

## 2020-06-05 DIAGNOSIS — M17.11 ARTHRITIS OF RIGHT KNEE: ICD-10-CM

## 2020-06-05 DIAGNOSIS — M17.12 ARTHRITIS OF LEFT KNEE: Primary | ICD-10-CM

## 2020-06-05 DIAGNOSIS — M25.562 PAIN IN BOTH KNEES, UNSPECIFIED CHRONICITY: ICD-10-CM

## 2020-06-05 DIAGNOSIS — M17.0 PRIMARY OSTEOARTHRITIS OF BOTH KNEES: ICD-10-CM

## 2020-06-05 DIAGNOSIS — M25.562 PAIN IN BOTH KNEES, UNSPECIFIED CHRONICITY: Primary | ICD-10-CM

## 2020-06-05 DIAGNOSIS — M25.561 PAIN IN BOTH KNEES, UNSPECIFIED CHRONICITY: Primary | ICD-10-CM

## 2020-06-05 DIAGNOSIS — M25.561 PAIN IN BOTH KNEES, UNSPECIFIED CHRONICITY: ICD-10-CM

## 2020-06-05 PROCEDURE — 20610 DRAIN/INJ JOINT/BURSA W/O US: CPT | Performed by: ORTHOPAEDIC SURGERY

## 2020-06-05 PROCEDURE — 1160F RVW MEDS BY RX/DR IN RCRD: CPT | Performed by: ORTHOPAEDIC SURGERY

## 2020-06-05 PROCEDURE — 1036F TOBACCO NON-USER: CPT | Performed by: ORTHOPAEDIC SURGERY

## 2020-06-05 PROCEDURE — 73564 X-RAY EXAM KNEE 4 OR MORE: CPT

## 2020-06-05 PROCEDURE — 99204 OFFICE O/P NEW MOD 45 MIN: CPT | Performed by: ORTHOPAEDIC SURGERY

## 2020-06-05 PROCEDURE — 4040F PNEUMOC VAC/ADMIN/RCVD: CPT | Performed by: ORTHOPAEDIC SURGERY

## 2020-06-05 PROCEDURE — 3074F SYST BP LT 130 MM HG: CPT | Performed by: ORTHOPAEDIC SURGERY

## 2020-06-05 PROCEDURE — 3078F DIAST BP <80 MM HG: CPT | Performed by: ORTHOPAEDIC SURGERY

## 2020-06-05 PROCEDURE — 3008F BODY MASS INDEX DOCD: CPT | Performed by: ORTHOPAEDIC SURGERY

## 2020-06-05 RX ORDER — METHYLPREDNISOLONE ACETATE 40 MG/ML
1 INJECTION, SUSPENSION INTRA-ARTICULAR; INTRALESIONAL; INTRAMUSCULAR; SOFT TISSUE
Status: COMPLETED | OUTPATIENT
Start: 2020-06-05 | End: 2020-06-05

## 2020-06-05 RX ORDER — BUPIVACAINE HYDROCHLORIDE 2.5 MG/ML
4 INJECTION, SOLUTION INFILTRATION; PERINEURAL
Status: COMPLETED | OUTPATIENT
Start: 2020-06-05 | End: 2020-06-05

## 2020-06-05 RX ADMIN — BUPIVACAINE HYDROCHLORIDE 4 ML: 2.5 INJECTION, SOLUTION INFILTRATION; PERINEURAL at 11:53

## 2020-06-05 RX ADMIN — METHYLPREDNISOLONE ACETATE 1 ML: 40 INJECTION, SUSPENSION INTRA-ARTICULAR; INTRALESIONAL; INTRAMUSCULAR; SOFT TISSUE at 11:53

## 2020-06-08 DIAGNOSIS — G47.00 INSOMNIA, UNSPECIFIED TYPE: ICD-10-CM

## 2020-06-08 RX ORDER — ZOLPIDEM TARTRATE 10 MG/1
10 TABLET ORAL
Qty: 30 TABLET | Refills: 2 | Status: CANCELLED | OUTPATIENT
Start: 2020-06-08

## 2020-06-08 RX ORDER — ZOLPIDEM TARTRATE 10 MG/1
10 TABLET ORAL
Qty: 90 TABLET | Refills: 0 | Status: SHIPPED | OUTPATIENT
Start: 2020-06-08 | End: 2020-11-23

## 2020-06-18 ENCOUNTER — EVALUATION (OUTPATIENT)
Dept: PHYSICAL THERAPY | Facility: CLINIC | Age: 70
End: 2020-06-18
Payer: COMMERCIAL

## 2020-06-18 DIAGNOSIS — M17.0 PRIMARY OSTEOARTHRITIS OF BOTH KNEES: ICD-10-CM

## 2020-06-18 PROCEDURE — 97161 PT EVAL LOW COMPLEX 20 MIN: CPT | Performed by: PHYSICAL THERAPIST

## 2020-06-23 ENCOUNTER — APPOINTMENT (OUTPATIENT)
Dept: PHYSICAL THERAPY | Facility: CLINIC | Age: 70
End: 2020-06-23
Payer: COMMERCIAL

## 2020-06-24 ENCOUNTER — ANTICOAG VISIT (OUTPATIENT)
Dept: FAMILY MEDICINE CLINIC | Facility: CLINIC | Age: 70
End: 2020-06-24

## 2020-06-24 DIAGNOSIS — Z79.01 LONG TERM CURRENT USE OF ANTICOAGULANT: Primary | ICD-10-CM

## 2020-06-24 LAB
INR PPP: 3 (ref 0.8–1.2)
PROTHROMBIN TIME: 29.4 SEC (ref 9.1–12)

## 2020-06-30 ENCOUNTER — OFFICE VISIT (OUTPATIENT)
Dept: PHYSICAL THERAPY | Facility: CLINIC | Age: 70
End: 2020-06-30
Payer: COMMERCIAL

## 2020-06-30 DIAGNOSIS — M17.0 PRIMARY OSTEOARTHRITIS OF BOTH KNEES: Primary | ICD-10-CM

## 2020-06-30 PROCEDURE — 97110 THERAPEUTIC EXERCISES: CPT

## 2020-06-30 PROCEDURE — 97140 MANUAL THERAPY 1/> REGIONS: CPT

## 2020-06-30 PROCEDURE — 97112 NEUROMUSCULAR REEDUCATION: CPT

## 2020-07-07 ENCOUNTER — APPOINTMENT (OUTPATIENT)
Dept: PHYSICAL THERAPY | Facility: CLINIC | Age: 70
End: 2020-07-07
Payer: COMMERCIAL

## 2020-07-08 ENCOUNTER — TELEPHONE (OUTPATIENT)
Dept: FAMILY MEDICINE CLINIC | Facility: CLINIC | Age: 70
End: 2020-07-08

## 2020-07-08 NOTE — TELEPHONE ENCOUNTER
Patient call requesting labs order for his 2 week bw for irn saw few order please review and order more if necessary Thank you

## 2020-07-09 ENCOUNTER — ANTICOAG VISIT (OUTPATIENT)
Dept: FAMILY MEDICINE CLINIC | Facility: CLINIC | Age: 70
End: 2020-07-09

## 2020-07-09 LAB
INR PPP: 4.2 (ref 0.8–1.2)
PROTHROMBIN TIME: 39.6 SEC (ref 9.1–12)

## 2020-07-09 NOTE — PROGRESS NOTES
Spoke with patient who reports he is taking 5mg M&F 2 5mg TWTSS and that he took a half dose 1 25mg on Wed 6/24/2020  This has been the same dose since 3/2019 -- the only difference is has been drinking some wine  Advised will have Dr Yun Givens review for decrease of dose  Notes recorded by Trey Giordano MD on 7/9/2020 at 8:09 AM EDT  Call patient with lab result-review dosing with pt   May need to decrease weekly total dose

## 2020-07-09 NOTE — PROGRESS NOTES
If he continues to drink of wine  Should decrease to 2 5 mg daily  I would recheck this in 1 week rather than the to

## 2020-07-14 ENCOUNTER — OFFICE VISIT (OUTPATIENT)
Dept: PHYSICAL THERAPY | Facility: CLINIC | Age: 70
End: 2020-07-14
Payer: COMMERCIAL

## 2020-07-14 DIAGNOSIS — M17.0 PRIMARY OSTEOARTHRITIS OF BOTH KNEES: Primary | ICD-10-CM

## 2020-07-14 PROCEDURE — 97112 NEUROMUSCULAR REEDUCATION: CPT

## 2020-07-14 PROCEDURE — 97110 THERAPEUTIC EXERCISES: CPT

## 2020-07-14 PROCEDURE — 97140 MANUAL THERAPY 1/> REGIONS: CPT

## 2020-07-14 NOTE — PROGRESS NOTES
Daily Note     Today's date: 2020  Patient name: Wanda Brewer  : 1950  MRN: [id number]  Referring provider: Neisha Jones MD  Dx:   Encounter Diagnosis     ICD-10-CM    1  Primary osteoarthritis of both knees M17 0                   Subjective: Pt reports walking the dog this AM and got stiff and sore towards the end of the session  States compliance w/ HEP but gets pain w/ the ex's  Pt reports he feels the injections have worn off  Objective: See treatment diary below      Assessment: Tolerated treatment fair  Patient given updated HEP  HEP was designed for when pt was feeling good 2 weeks ago  Pt given modified HEP and told to avoid pain  Pt given CP post to and instructed to use when pain increases  Plan: Continue per plan of care  Progress treatment as tolerated         Daily Treatment Diary    EPOC: 2020  Precautions: Hx of embolism (pt reports "years ago"); hx of A-fib; ON WARFARIN- NO IASTM  Co- Morbidities:   Patient Active Problem List   Diagnosis    Status post catheter ablation of atrial flutter    Dyslipidemia    Essential hypertension    WILL (obstructive sleep apnea)    Factor V Leiden mutation (HCC)    Primary osteoarthritis of both knees    Erectile dysfunction    Depression, major, single episode, moderate (HCC)    Insomnia    Lumbar herniated disc    Arthritis of left knee    Arthritis of right knee         Manual      B/L Knee PROM   NV   JK  JK      B/L Patellar PROM   NV   JK  JK      TPR to R distal adductors/ITB  NV   JK  JK                  Total Time     12'  12'       Exercise Diary   7/14       HEP INSTRUCT/HANDOUT/SELF MGMT 5'       THEREX        Recumbent Bike     5'  5'       Gastrocs Stretch    wedge  30"x3  30"x3       HR/TR     20/20  20/20       Active HS stretch w/ ankle pumps  nv      Heel Slides  10x 15x/15x                     NEURO RE-ED        SLR flex/abd     10x/  15/15       Clamshells  NV hep Minisquats     5"x10  8x         TB resisted hip abduction   OTB  10x   OTB  10x        TB resisted sidestepping  OTB  3 laps p!      Fwd/lat step ups  6"x10 ea np              Gait Training               Modalities 6/18 7/14                   CP PRN declined  10'

## 2020-07-16 LAB
INR PPP: 2 (ref 0.8–1.2)
PROTHROMBIN TIME: 19.6 SEC (ref 9.1–12)

## 2020-07-17 ENCOUNTER — ANTICOAG VISIT (OUTPATIENT)
Dept: FAMILY MEDICINE CLINIC | Facility: CLINIC | Age: 70
End: 2020-07-17

## 2020-07-17 NOTE — PROGRESS NOTES
Notes recorded by Mike Yadav MD on 7/17/2020 at 9:09 AM EDT  Call patient with lab result-I believe we had cut down his Coumadin to 2 5 daily  Nitza Kain been taking 5 2 days a week and running high   I would have him take 1 extra 2 5 1 day per week   Recheck this in 2 weeks    [facility] for patient with current INR  Results, patient is to take 2 5 mg of coumadin on Mondays, Tuesday, thursdays, Friday, Saturday and sundays and 5 mg of coumadin on Wednesdays  Until next blood work on 7/31/20  I ask patient to give us a call back to confirm that he receive this message

## 2020-07-21 ENCOUNTER — APPOINTMENT (OUTPATIENT)
Dept: PHYSICAL THERAPY | Facility: CLINIC | Age: 70
End: 2020-07-21
Payer: COMMERCIAL

## 2020-07-28 ENCOUNTER — APPOINTMENT (OUTPATIENT)
Dept: PHYSICAL THERAPY | Facility: CLINIC | Age: 70
End: 2020-07-28
Payer: COMMERCIAL

## 2020-07-31 ENCOUNTER — TELEPHONE (OUTPATIENT)
Dept: OBGYN CLINIC | Facility: HOSPITAL | Age: 70
End: 2020-07-31

## 2020-07-31 DIAGNOSIS — M17.0 PRIMARY OSTEOARTHRITIS OF BOTH KNEES: Primary | ICD-10-CM

## 2020-07-31 NOTE — TELEPHONE ENCOUNTER
I phoned and discussed with patient  VS injections would be next step and I placed order for prior auth  If this does not help, next step would be to conisder TKA  Patient understands - no need to phone him back      MLB

## 2020-07-31 NOTE — TELEPHONE ENCOUNTER
Patient is calling to report that he did have some great response to the CSI in June  But about 2 weeks ago, the pain came back full force  Patient states that the PT didn't help, in fact it felt like it made it worse  Patient was advised that he would not be able to get another round of steroid injections until early September  Patient would like to speak with Dr Sammy Moritz in reference to his options now  He cannot wait until September for relief  Patient wondered if there are different dosages of the steroid injection that can be given  This round seemed to have provided relief for about 6 weeks  Please advise

## 2020-08-01 LAB
INR PPP: 1.6 (ref 0.8–1.2)
PROTHROMBIN TIME: 15.9 SEC (ref 9.1–12)

## 2020-08-03 ENCOUNTER — TELEPHONE (OUTPATIENT)
Dept: FAMILY MEDICINE CLINIC | Facility: CLINIC | Age: 70
End: 2020-08-03

## 2020-08-03 ENCOUNTER — ANTICOAG VISIT (OUTPATIENT)
Dept: FAMILY MEDICINE CLINIC | Facility: CLINIC | Age: 70
End: 2020-08-03

## 2020-08-03 DIAGNOSIS — Z79.01 LONG TERM CURRENT USE OF ANTICOAGULANT: Primary | ICD-10-CM

## 2020-08-03 NOTE — TELEPHONE ENCOUNTER
Patient notified with INR results, patient stated that he missed his 5 MG dosage  Per Doctor Casper Lawrence: have patient take the same dosage since he missed 5 mg and the repeat INR In 2 weeks  Patient notified

## 2020-08-03 NOTE — PROGRESS NOTES
Patient notified with INR results, patient stated that he missed his 5 MG dosage  Per Doctor Na Barnes: have patient take the same dosage since he missed 5 mg and the repeat INR In 2 weeks  Patient notified

## 2020-08-03 NOTE — TELEPHONE ENCOUNTER
----- Message from Alia Hill MD sent at 8/1/2020  1:51 PM EDT -----  Call patient with lab result-verify no missed dose warfarin- he is now low- return to his previous dosing schedule before we started to reduce 4-5 weeks ago

## 2020-08-04 DIAGNOSIS — Z79.01 LONG TERM CURRENT USE OF ANTICOAGULANT: Primary | ICD-10-CM

## 2020-08-04 DIAGNOSIS — Z79.01 ANTICOAGULATED ON COUMADIN: ICD-10-CM

## 2020-08-04 DIAGNOSIS — M51.26 LUMBAR HERNIATED DISC: ICD-10-CM

## 2020-08-04 DIAGNOSIS — F32.A DEPRESSION, UNSPECIFIED DEPRESSION TYPE: ICD-10-CM

## 2020-08-04 RX ORDER — CELECOXIB 200 MG/1
200 CAPSULE ORAL DAILY
Qty: 90 CAPSULE | Refills: 0 | Status: SHIPPED | OUTPATIENT
Start: 2020-08-04 | End: 2020-09-28

## 2020-08-04 RX ORDER — DULOXETIN HYDROCHLORIDE 60 MG/1
60 CAPSULE, DELAYED RELEASE ORAL DAILY
Qty: 90 CAPSULE | Refills: 0 | Status: SHIPPED | OUTPATIENT
Start: 2020-08-04 | End: 2020-09-28

## 2020-08-07 ENCOUNTER — TELEPHONE (OUTPATIENT)
Dept: OBGYN CLINIC | Facility: HOSPITAL | Age: 70
End: 2020-08-07

## 2020-08-07 NOTE — TELEPHONE ENCOUNTER
Please reach out the authorization team - this is handled by them  I did inform patient that it can sometimes take 2-3 weeks to get these approved  Unfortunately, I cannot expedite this process  Juan Antonio Ryan, do you have anything to help expedite?     PHIL

## 2020-08-07 NOTE — TELEPHONE ENCOUNTER
Spoke to patient  He would like an update on the injection ordered for him  Did advise that these injections do take time but he stated he was told he could have a response by this week  Please advise on injection status

## 2020-08-08 RX ORDER — WARFARIN SODIUM 5 MG/1
TABLET ORAL
Qty: 90 TABLET | Refills: 1 | Status: SHIPPED | OUTPATIENT
Start: 2020-08-08 | End: 2020-08-10 | Stop reason: SDUPTHER

## 2020-08-10 DIAGNOSIS — Z79.01 LONG TERM CURRENT USE OF ANTICOAGULANT: ICD-10-CM

## 2020-08-10 DIAGNOSIS — Z79.01 ANTICOAGULATED ON COUMADIN: ICD-10-CM

## 2020-08-10 RX ORDER — WARFARIN SODIUM 5 MG/1
TABLET ORAL
Qty: 90 TABLET | Refills: 1 | Status: SHIPPED | OUTPATIENT
Start: 2020-08-10 | End: 2021-02-15 | Stop reason: SDUPTHER

## 2020-08-10 RX ORDER — WARFARIN SODIUM 5 MG/1
TABLET ORAL
Qty: 90 TABLET | Refills: 1 | Status: CANCELLED | OUTPATIENT
Start: 2020-08-10

## 2020-08-10 NOTE — TELEPHONE ENCOUNTER
Patient states he need a refill on on his warfarin (COUMADIN) 5 mg tablet but he will be running out and he usually gets them thru mailorder but that takes about a week and by then he will be out of them today  He Is asking for a 10 day supply to hold him off  Please review      Allergies: Morphine     [facility]  [phone]

## 2020-08-10 NOTE — TELEPHONE ENCOUNTER
No auth req- B&B    Patient is scheduled   TO BE COMPLETED BY CENTRAL AUTH TEAM:     Physician:DR MCKEON    Medication: SYNVISC-ONE     Number of Injections in Series (Appointments scheduled 1 week apart from one another): 1    Schedule after this date: UPON AVAILABILITY     Billing Info: Buy and Bill/Specialty Pharmacy-Patient Supply<<BUY AND BILL     Appointment Message Line: BILATERAL SYNVISC-ONE BUY AND BILL   (please copy and paste appointment message line when scheduling appointment)

## 2020-08-11 LAB
INR PPP: 1.9 (ref 0.8–1.2)
PROTHROMBIN TIME: 19.2 SEC (ref 9.1–12)

## 2020-08-12 ENCOUNTER — ANTICOAG VISIT (OUTPATIENT)
Dept: FAMILY MEDICINE CLINIC | Facility: CLINIC | Age: 70
End: 2020-08-12

## 2020-08-12 ENCOUNTER — OFFICE VISIT (OUTPATIENT)
Dept: OBGYN CLINIC | Facility: CLINIC | Age: 70
End: 2020-08-12
Payer: COMMERCIAL

## 2020-08-12 VITALS — TEMPERATURE: 97.7 F | BODY MASS INDEX: 34.95 KG/M2 | WEIGHT: 258 LBS | HEIGHT: 72 IN

## 2020-08-12 DIAGNOSIS — M17.0 PRIMARY OSTEOARTHRITIS OF BOTH KNEES: Primary | ICD-10-CM

## 2020-08-12 DIAGNOSIS — Z79.01 LONG TERM CURRENT USE OF ANTICOAGULANT: Primary | ICD-10-CM

## 2020-08-12 PROCEDURE — 3008F BODY MASS INDEX DOCD: CPT | Performed by: ORTHOPAEDIC SURGERY

## 2020-08-12 PROCEDURE — 20610 DRAIN/INJ JOINT/BURSA W/O US: CPT | Performed by: ORTHOPAEDIC SURGERY

## 2020-08-12 NOTE — PROGRESS NOTES
Patient presents for bilateral knee synvisc one injections  Large joint arthrocentesis: bilateral knee  Date/Time: 8/12/2020 10:07 AM  Consent given by: patient  Site marked: site marked  Timeout: Immediately prior to procedure a time out was called to verify the correct patient, procedure, equipment, support staff and site/side marked as required   Supporting Documentation  Indications: pain   Procedure Details  Location: knee - bilateral knee  Needle size: 20 G  Approach: anterior    Medications (Right): 48 mg hylan 48 MG/6MLMedications (Left): 48 mg hylan 48 MG/6ML         Bala Baker MD  Adult Reconstruction  Department of Orthopaedic Surgery  [address]  08/12/20  10:08 AM

## 2020-08-12 NOTE — PROGRESS NOTES
Patient notified as per Dr Jay Jay Worley, will recheck in 2 weeks    5 new [facility] INR orders placed      James Masterson MD  [address] [facility]               Call patient with lab result-continue same

## 2020-08-16 ENCOUNTER — NURSE TRIAGE (OUTPATIENT)
Dept: OTHER | Facility: OTHER | Age: 70
End: 2020-08-16

## 2020-08-16 NOTE — TELEPHONE ENCOUNTER
Reason for Disposition   [1] Red area or streak [2] large (> 2 in  or 5 cm)    Answer Assessment - Initial Assessment Questions  1  ONSET: "When did the swelling start?" (e g , minutes, hours, days)      This morning    2  LOCATION: "What part of the leg is swollen?"  "Are both legs swollen or just one leg?"      Above the foot but below the knee on the right leg  3  SEVERITY: "How bad is the swelling?" (e g , localized; mild, moderate, severe)   - Localized - small area of swelling localized to one leg   - MILD pedal edema - swelling limited to foot and ankle, pitting edema < 1/4 inch (6 mm) deep, rest and elevation eliminate most or all swelling   - MODERATE edema - swelling of lower leg to knee, pitting edema > 1/4 inch (6 mm) deep, rest and elevation only partially reduce swelling   - SEVERE edema - swelling extends above knee, facial or hand swelling present       Mild    4  REDNESS: "Does the swelling look red or infected?"      Reports redness and that it's purple-tin  About 2/3 of the area between foot and knee is reddened/purple  5  PAIN: "Is the swelling painful to touch?" If so, ask: "How painful is it?"   (Scale 1-10; mild, moderate or severe)      7/10 pain    6  FEVER: "Do you have a fever?" If so, ask: "What is it, how was it measured, and when did it start?"       Denies    7  CAUSE: "What do you think is causing the leg swelling?"      Had a gel injection on B/L knees on Thursday, but reports doesn't think this is the cause     8  MEDICAL HISTORY: "Do you have a history of heart failure, kidney disease, liver failure, or cancer?"      History of DVT/clots and on a blood thinner  Denies other medical issues    9  RECURRENT SYMPTOM: "Have you had leg swelling before?" If so, ask: "When was the last time?" "What happened that time?"      Reports having a clot many years ago but it didn't present similar to this     10   OTHER SYMPTOMS: "Do you have any other symptoms?" (e g , chest pain, difficulty breathing)        Warm to the touch  Denies SOB and chest pain      Protocols used: LEG SWELLING AND EDEMA-ADULT-AH

## 2020-08-16 NOTE — TELEPHONE ENCOUNTER
Regarding: Possible Cellulitis  ----- Message from [facility] sent at 8/16/2020  1:10 PM EDT -----  "My husbands right leg below the knee but above the foot is red/purplish in color and warm to the touch "

## 2020-08-27 DIAGNOSIS — N40.1 BPH WITH OBSTRUCTION/LOWER URINARY TRACT SYMPTOMS: ICD-10-CM

## 2020-08-27 DIAGNOSIS — N13.8 BPH WITH OBSTRUCTION/LOWER URINARY TRACT SYMPTOMS: ICD-10-CM

## 2020-08-27 RX ORDER — TAMSULOSIN HYDROCHLORIDE 0.4 MG/1
CAPSULE ORAL
Qty: 90 CAPSULE | Refills: 0 | Status: SHIPPED | OUTPATIENT
Start: 2020-08-27 | End: 2020-11-18 | Stop reason: SDUPTHER

## 2020-08-28 ENCOUNTER — ANTICOAG VISIT (OUTPATIENT)
Dept: FAMILY MEDICINE CLINIC | Facility: CLINIC | Age: 70
End: 2020-08-28

## 2020-08-28 LAB
INR PPP: 2.1 (ref 0.8–1.2)
PROTHROMBIN TIME: 20.3 SEC (ref 9.1–12)

## 2020-09-03 ENCOUNTER — TELEPHONE (OUTPATIENT)
Dept: OBGYN CLINIC | Facility: HOSPITAL | Age: 70
End: 2020-09-03

## 2020-09-03 NOTE — TELEPHONE ENCOUNTER
Patient is calling because he had an appt on his calendar with Dr Debbi Marc on 9/4/20  The appt is listed under the past tab & says that it was cancelled by the provider  Patient would like to know why  Patient would like to know about getting another steroid injection  Patient states that the last gel injections that he got did not work at all & it made both knees feel worse  Patient thought he could get a steroid injection on 9/4/20   Please let the patient know when he could come in for a steroid & how long does he need to wait until he can get the steroid since he just had the gel injections on 8/12/20

## 2020-09-03 NOTE — TELEPHONE ENCOUNTER
No idea why appt was cancelled  Certainly happy to see patient tomorrow and I think we could do another CSI if he would like      PHIL

## 2020-09-04 ENCOUNTER — OFFICE VISIT (OUTPATIENT)
Dept: OBGYN CLINIC | Facility: CLINIC | Age: 70
End: 2020-09-04
Payer: COMMERCIAL

## 2020-09-04 VITALS
DIASTOLIC BLOOD PRESSURE: 72 MMHG | WEIGHT: 258 LBS | HEIGHT: 72 IN | SYSTOLIC BLOOD PRESSURE: 130 MMHG | TEMPERATURE: 97.3 F | BODY MASS INDEX: 34.95 KG/M2

## 2020-09-04 DIAGNOSIS — M17.0 PRIMARY OSTEOARTHRITIS OF BOTH KNEES: Primary | ICD-10-CM

## 2020-09-04 PROCEDURE — 99213 OFFICE O/P EST LOW 20 MIN: CPT | Performed by: ORTHOPAEDIC SURGERY

## 2020-09-04 PROCEDURE — 3075F SYST BP GE 130 - 139MM HG: CPT | Performed by: ORTHOPAEDIC SURGERY

## 2020-09-04 PROCEDURE — 20610 DRAIN/INJ JOINT/BURSA W/O US: CPT | Performed by: ORTHOPAEDIC SURGERY

## 2020-09-04 PROCEDURE — 3078F DIAST BP <80 MM HG: CPT | Performed by: ORTHOPAEDIC SURGERY

## 2020-09-04 RX ORDER — METHYLPREDNISOLONE ACETATE 40 MG/ML
1 INJECTION, SUSPENSION INTRA-ARTICULAR; INTRALESIONAL; INTRAMUSCULAR; SOFT TISSUE
Status: COMPLETED | OUTPATIENT
Start: 2020-09-04 | End: 2020-09-04

## 2020-09-04 RX ORDER — BUPIVACAINE HYDROCHLORIDE 2.5 MG/ML
4 INJECTION, SOLUTION INFILTRATION; PERINEURAL
Status: COMPLETED | OUTPATIENT
Start: 2020-09-04 | End: 2020-09-04

## 2020-09-04 RX ADMIN — METHYLPREDNISOLONE ACETATE 1 ML: 40 INJECTION, SUSPENSION INTRA-ARTICULAR; INTRALESIONAL; INTRAMUSCULAR; SOFT TISSUE at 10:39

## 2020-09-04 RX ADMIN — BUPIVACAINE HYDROCHLORIDE 4 ML: 2.5 INJECTION, SOLUTION INFILTRATION; PERINEURAL at 10:39

## 2020-09-04 NOTE — PROGRESS NOTES
Patient returns requesting repeat CSI bilateral knees  Reports VS injections did not help at all  CSI today and return approximately 3 months for repeat injection  Large joint arthrocentesis: bilateral knee  Date/Time: 9/4/2020 10:39 AM  Consent given by: patient  Site marked: site marked  Timeout: Immediately prior to procedure a time out was called to verify the correct patient, procedure, equipment, support staff and site/side marked as required   Supporting Documentation  Indications: pain   Procedure Details  Location: knee - bilateral knee  Needle size: 20 G  Approach: anterior    Medications (Right): 4 mL bupivacaine 0 25 %; 1 mL methylPREDNISolone acetate 40 mg/mLMedications (Left): 4 mL bupivacaine 0 25 %; 1 mL methylPREDNISolone acetate 40 mg/mL   Patient tolerance: patient tolerated the procedure well with no immediate complications  Dressing:  Sterile dressing applied        Bala Arreaga MD  Adult Reconstruction  Department of [place] [id number]  09/04/20  10:40 AM

## 2020-09-08 ENCOUNTER — OFFICE VISIT (OUTPATIENT)
Dept: CARDIOLOGY CLINIC | Facility: CLINIC | Age: 70
End: 2020-09-08
Payer: COMMERCIAL

## 2020-09-08 VITALS
TEMPERATURE: 96.5 F | WEIGHT: 263.8 LBS | HEIGHT: 72 IN | SYSTOLIC BLOOD PRESSURE: 130 MMHG | DIASTOLIC BLOOD PRESSURE: 80 MMHG | HEART RATE: 51 BPM | BODY MASS INDEX: 35.73 KG/M2

## 2020-09-08 DIAGNOSIS — R60.0 LOWER EXTREMITY EDEMA: ICD-10-CM

## 2020-09-08 DIAGNOSIS — D68.51 FACTOR V LEIDEN MUTATION (HCC): ICD-10-CM

## 2020-09-08 DIAGNOSIS — I10 ESSENTIAL HYPERTENSION: ICD-10-CM

## 2020-09-08 DIAGNOSIS — I48.0 PAROXYSMAL ATRIAL FIBRILLATION (HCC): Primary | ICD-10-CM

## 2020-09-08 DIAGNOSIS — Z98.890 STATUS POST CATHETER ABLATION OF ATRIAL FLUTTER: ICD-10-CM

## 2020-09-08 DIAGNOSIS — G47.33 OSA (OBSTRUCTIVE SLEEP APNEA): ICD-10-CM

## 2020-09-08 DIAGNOSIS — E78.5 DYSLIPIDEMIA: ICD-10-CM

## 2020-09-08 PROCEDURE — 93000 ELECTROCARDIOGRAM COMPLETE: CPT | Performed by: INTERNAL MEDICINE

## 2020-09-08 PROCEDURE — 99214 OFFICE O/P EST MOD 30 MIN: CPT | Performed by: INTERNAL MEDICINE

## 2020-09-08 NOTE — PROGRESS NOTES
Cardiology Follow Up    Julio Flight  1950  [id number]  HEART & VASCULAR  [facility] Jenna Salcido  [address]  [id number] [place] [address] [address] [id number]    1  Paroxysmal atrial fibrillation (HCC)  POCT ECG   2  Status post catheter ablation of atrial flutter     3  Factor V Leiden mutation ([address] )     4  Essential hypertension     5  Dyslipidemia     6  WILL (obstructive sleep apnea)     7  Lower extremity edema         Interval History:     Elizabeth Benson returns for followup given his history of paroxysmal atrial fibrillation  This was diagnosed at the time of a colonoscopy, performed by Dr Wilbur Munoz a few years ago  After starting metoprolol, in follow-up he was back to sinus rhythm  Then in January 2015 Elizabeth Benson arrived in atrial flutter with 2:1 conduction  He however has no symptoms other than is ongoing fatigue  At that time I set him up for a cardioversion that next week  He converted without any problems  He then met Dr Caity Amador of our EP service  He then came back a week later and had his atrial flutter ablated  He was in sinus rhythm up until a few appointments ago in which he was found to be back in atrial fibrillation, with controlled rates  He has been on Coumadin for years given his history of a DVT and being found to have factor 5 Leiden mutation-homozygous  When we 1st met him, he also went for a sleep study, and was found to have severe obstructive sleep apnea  He however did not tolerate the CPAP mask due to claustrophobia  I encouraged him to seek consultation with sleep medicine, and for a while he did not do this  However after last year he did do this  He then had proper mask fitting, and now is on CPAP  He has been compliant on this since  For a while there Elizabeth Benson remained in atrial fibrillation, and did complain of some fatigue and some shortness of breath /diaphoresis on exertion  He overall felt well, but we discussed a rhythm control strategy    He we set him up for a cardioversion which was only transiently successful  Within the month he was back in atrial fibrillation  We had him undergo stress nuclear study which was normal   At that point the thought was another cardioversion, and then start flecainide  When he arrived for his cardioversion in December 2018 he converted on his own back to sinus rhythm  He remains in sinus rhythm today on flecainide and metoprolol  Roberto Hunt tells me he feels well  He is asymptomatic from a cardiac standpoint  It is difficult to tell if he feels any different in sinus rhythm  He denies any lightheadedness or syncope  He denies any symptoms of congestive heart failure  He tells me that since starting CPAP overall he does feel like he has more energy  He denies any shortness of breath or chest pain  He does jimenez with some on and off fatigue    He has been having some intermittent lower extremity      Past Medical History:   Diagnosis Date    Acute venous embolism and thrombosis of deep vessels of proximal lower extremity (HCC)     Last assessed: 5/18/15    Paroxysmal atrial fibrillation (HCC)     Last assessed: 11/2/15     Social History     Socioeconomic History    Marital status: /Civil Union     Spouse name: Not on file    Number of children: Not on file    Years of education: Not on file    Highest education level: Not on file   Occupational History    Not on file   Social Needs    Financial resource strain: Not on file    Food insecurity     Worry: Not on file     Inability: Not on file   [employer] needs     Medical: Not on file     Non-medical: Not on file   Tobacco Use    Smoking status: Never Smoker    Smokeless tobacco: Never Used   Substance and Sexual Activity    Alcohol use: No    Drug use: No    Sexual activity: Not on file   Lifestyle    Physical activity     Days per week: Not on file     Minutes per session: Not on file    Stress: Not on file   Relationships    Social connections     Talks on phone: Not on file     Gets together: Not on file     Attends Denominational service: Not on file     Active member of club or organization: Not on file     Attends meetings of clubs or organizations: Not on file     Relationship status: Not on file    Intimate partner violence     Fear of current or ex partner: Not on file     Emotionally abused: Not on file     Physically abused: Not on file     Forced sexual activity: Not on file   Other Topics Concern    Not on file   Social History Narrative    Caffeine use: 2 cups coffee a day      Family History   Problem Relation Age of Onset    Lung cancer Mother     No Known Problems Father     Heart attack Brother     Atrial fibrillation Brother     Emphysema Sister      Past Surgical History:   Procedure Laterality Date    BACK SURGERY      Lower    COLON SURGERY         Current Outpatient Medications:     celecoxib (CeleBREX) 200 mg capsule, Take 1 capsule (200 mg total) by mouth daily, Disp: 90 capsule, Rfl: 0    DULoxetine (CYMBALTA) 60 mg delayed release capsule, Take 1 capsule (60 mg total) by mouth daily, Disp: 90 capsule, Rfl: 0    flecainide (TAMBOCOR) 100 mg tablet, Take 1 tablet (100 mg total) by mouth 2 (two) times a day, Disp: 180 tablet, Rfl: 1    metoprolol succinate (TOPROL-XL) 100 mg 24 hr tablet, TAKE 1 TABLET BY MOUTH  DAILY, Disp: 90 tablet, Rfl: 1    pravastatin (PRAVACHOL) 40 mg tablet, Take 1 tablet (40 mg total) by mouth daily, Disp: 90 tablet, Rfl: 1    tamsulosin (FLOMAX) 0 4 mg, TAKE 1 CAPSULE BY MOUTH EVERY DAY WITH DINNER, Disp: 90 capsule, Rfl: 0    warfarin (COUMADIN) 5 mg tablet, Take warfarin dose daily as directed by PT/INR , Disp: 90 tablet, Rfl: 1    zolpidem (AMBIEN) 10 mg tablet, Take 1 tablet (10 mg total) by mouth daily at bedtime as needed for sleep, Disp: 90 tablet, Rfl: 0  Allergies   Allergen Reactions    Morphine Nausea Only       Labs:  Lab Results   Component Value Date     10/25/2017    K 4 2 10/10/2019     10/10/2019    CO2 26 10/10/2019    BUN 14 10/10/2019    CREATININE 0 80 10/10/2019    CREATININE 1 24 10/25/2017    GLUCOSE 102 (H) 10/25/2017    CALCIUM 9 6 10/25/2017     Lab Results   Component Value Date    WBC 8 43 01/23/2015    HGB 12 9 01/23/2015    HCT 39 5 01/23/2015    MCV 93 01/23/2015     01/23/2015     Lab Results   Component Value Date    CHOL 151 10/25/2017    TRIG 239 (H) 05/28/2020    HDL 44 05/28/2020     Imaging:   ECG obtained today demonstrates sinus bradycardia with a right bundle branch block  Review of Systems:  Review of Systems   Constitutional: Positive for fatigue  HENT: Negative  Eyes: Negative  Cardiovascular: Positive for leg swelling  Gastrointestinal: Negative  Musculoskeletal: Negative  Skin: Negative  Allergic/Immunologic: Negative  Neurological: Negative  Hematological: Negative  Psychiatric/Behavioral: Negative  All other systems reviewed and are negative  Vitals:    09/08/20 1327   BP: 130/80   BP Location: Left arm   Patient Position: Sitting   Cuff Size: Standard   Pulse: (!) 51   Temp: (!) 96 5 °F (35 8 °C)   Weight: 120 kg (263 lb 12 8 oz)   Height: 6' (1 829 m)       Physical Exam:  Physical Exam   Constitutional: He is oriented to person, place, and time  He appears well-developed and well-nourished  HENT:   Head: Normocephalic and atraumatic  Eyes: Pupils are equal, round, and reactive to light  EOM are normal  Right eye exhibits no discharge  Left eye exhibits no discharge  No scleral icterus  Neck: Normal range of motion  Neck supple  No JVD present  No thyromegaly present  Cardiovascular: Normal rate, regular rhythm, S1 normal, S2 normal, intact distal pulses and normal pulses  Exam reveals distant heart sounds  Exam reveals no gallop and no friction rub  No murmur heard  Pulmonary/Chest: Effort normal and breath sounds normal  No respiratory distress  He has no wheezes   He has no rales  Abdominal: Soft  Bowel sounds are normal  He exhibits no distension  There is no abdominal tenderness  Musculoskeletal: Normal range of motion  General: No tenderness, deformity or edema  Neurological: He is alert and oriented to person, place, and time  No cranial nerve deficit  Skin: Skin is warm and dry  No rash noted  Psychiatric: He has a normal mood and affect  Judgment and thought content normal    Nursing note and vitals reviewed  Discussion/Summary:    1  PAF - Severa Muff remains in sinus rhythm  We will continue the same dose of metoprolol and flecainide  We will follow in ECG each visit  He is on Coumadin for stroke prevention given his history of factor 5 Leiden mutation  No changes made today  We will see him back in 6 months  2  Obstructive sleep apnea -  He is now on and tolerating CPAP  It is encouraged for him to remain compliant on this  I have also asked him to follow-up with sleep medicine  3   Hypertension - His blood pressure is under good control  No changes were made  He should regularly check his blood pressure at home  4   Dyslipidemia - His LDL significantly increased last year  He has been compliant on pravastatin, this is likely related to dietary indiscretions  With changing his diet his LDL did improve back to where it had been in the past   No changes made today and he will periodically have blood work followed  5   Lower extremity edema - Multifactorial associated with his history, likely venous insufficiency and sleep apnea  He also had cellulitis of his left lower extremity since I last saw on  We talked about lifestyle modifications 1st that included following a low-sodium diet, keeping his legs elevated and using compression stockings  If this worsened we will discuss diuretic therapy  He will be back to see us in 6 months  Counseling / Coordination of Care  Total floor / unit time spent today 25 minutes    Greater than 50% of total time was spent with the patient and / or family counseling and / or coordination of care

## 2020-09-08 NOTE — PATIENT INSTRUCTIONS
Low-Sodium Diet   AMBULATORY CARE:   A low-sodium diet  limits foods that are high in sodium (salt)  You will need to follow a low-sodium diet if you have high blood pressure, kidney disease, or heart failure  You may also need to follow this diet if you have a condition that is causing your body to retain (hold) extra fluid  You may need to limit the amount of sodium you eat to 1,500 mg  Ask your healthcare provider how much sodium you can have each day  How to use food labels to choose foods that are low in sodium:  Read food labels to find the amount of sodium they contain  The amount of sodium is listed in milligrams (mg)  The % Daily Value (DV) column tells you how much of your daily needs are met by 1 serving of the food for each nutrient listed  Choose foods that have less than 5% of the DV of sodium  These foods are considered low in sodium  Foods that have 20% or more of the DV of sodium are considered high in sodium  Some food labels may also list any of the following terms that tell you about the sodium content in the food:  · Sodium-free:  Less than 5 mg in each serving    · Very low sodium:  35 mg of sodium or less in each serving    · Low sodium:  140 mg of sodium or less in each serving    · Reduced sodium: At least 25% less sodium in each serving than the regular type    · Light in sodium:  50% less sodium in each serving    · Unsalted or no added salt:  No extra salt is added during processing (the food may still contain sodium)  Foods to avoid:  Salty foods are high in sodium   You should avoid the following:  · Processed foods:      ¨ Mixes for cornbread, biscuits, cake, and pudding     ¨ Instant foods, such as potatoes, cereals, noodles, and rice     ¨ Packaged foods, such as bread stuffing, rice and pasta mixes, snack dip mixes, and macaroni and cheese     ¨ Canned foods, such as canned vegetables, soups, broths, sauces, and vegetable or tomato juice    ¨ Snack foods, such as salted chips, popcorn, pretzels, pork rinds, salted crackers, and salted nuts    ¨ Frozen foods, such as dinners, entrees, vegetables with sauces, and breaded meats    ¨ Sauerkraut, pickled vegetables, and other foods prepared in brine    · Meats and cheeses:      ¨ Smoked or cured meat, such as corned beef, march, ham, hot dogs, and sausage    ¨ Canned meats or spreads, such as potted meats, sardines, anchovies, and imitation seafood    ¨ Deli or lunch meats, such as bologna, ham, turkey, and roast beef    ¨ Processed cheese, such as American cheese and cheese spreads    · Condiments, sauces, and seasonings:      ¨ Salt (¼ teaspoon of salt contains 575 mg of sodium)    ¨ Seasonings made with salt, such as garlic salt, celery salt, onion salt, and seasoned salt    ¨ Regular soy sauce, barbecue sauce, teriyaki sauce, steak sauce, Worcestershire sauce, and most flavored vinegars    ¨ Canned gravy and mixes     ¨ Regular condiments, such as mustard, ketchup, and salad dressings    ¨ Pickles and olives    ¨ Meat tenderizers and monosodium glutamate (MSG)  Foods to include:  Read the food label to find the exact amount of sodium in each serving  · Bread and cereal:  Try to choose breads with less than 80 mg of sodium per serving  ¨ Bread, roll, lorenzo, tortilla, or unsalted crackers  ¨ Ready-to-eat cereals with less than 5% DV of sodium (examples include shredded wheat and puffed rice)    ¨ Pasta    · Vegetables and fruits:      ¨ Unsalted fresh, frozen, or canned vegetables    ¨ Fresh, frozen, or canned fruits    ¨ Fruit juice    · Dairy:  One serving has about 150 mg of sodium  ¨ Milk, all types    ¨ Yogurt    ¨ Hard cheese, such as cheddar, Swiss, [employer], or mozzarella    · Meat and other protein foods:  Some raw meats may have added sodium       ¨ Plain meats, fish, and poultry     ¨ Eggs    · Other foods:      ¨ Homemade pudding    ¨ Unsalted nuts, popcorn, or pretzels    ¨ Unsalted butter or margarine  Ways to decrease sodium:   · Add spices and herbs to foods instead of salt during cooking  Use salt-free seasonings to add flavor to foods  Examples include onion powder, garlic powder, basil, jorge powder, paprika, and parsley  Try lemon or lime juice or vinegar to give foods a tart flavor  Use hot peppers, pepper, or cayenne pepper to add a spicy flavor to foods  · Do not keep a salt shaker at your kitchen table  This may help keep you from adding salt to food at the table  It may take time to get used to enjoying the natural flavor of food instead of adding salt  Talk to your healthcare provider before you use salt substitutes  Some salt substitutes have a high amount of potassium and need to be avoided if you have kidney disease  · Choose low-sodium foods at restaurants  Meals from restaurants are often high in sodium  Some restaurants have nutrition information on the menu that tells you the amount of sodium in their foods  If possible, ask for your food to be prepared with less, or no salt  · Shop for unsalted or low-sodium foods and snacks at the grocery store  Examples include unsalted or low-sodium broths, soups, and canned vegetables  Choose fresh or frozen vegetables instead  Choose unsalted nuts or seeds or fresh fruits or vegetables as snacks  Read food labels and choose salt-free, very low-sodium, or low-sodium foods  © 2017 [employer] Information is for End User's use only and may not be sold, redistributed or otherwise used for commercial purposes  All illustrations and images included in CareNotes® are the copyrighted property of A D A M , Inc  or Zia Kauffman  The above information is an  only  It is not intended as medical advice for individual conditions or treatments  Talk to your doctor, nurse or pharmacist before following any medical regimen to see if it is safe and effective for you

## 2020-09-16 ENCOUNTER — ANTICOAG VISIT (OUTPATIENT)
Dept: FAMILY MEDICINE CLINIC | Facility: CLINIC | Age: 70
End: 2020-09-16

## 2020-09-16 LAB
INR PPP: 1.8 (ref 0.8–1.2)
PROTHROMBIN TIME: 18.2 SEC (ref 9.1–12)

## 2020-09-27 DIAGNOSIS — I10 ESSENTIAL HYPERTENSION: ICD-10-CM

## 2020-09-27 DIAGNOSIS — E78.5 DYSLIPIDEMIA: ICD-10-CM

## 2020-09-27 DIAGNOSIS — I48.91 ATRIAL FIBRILLATION, UNSPECIFIED TYPE (HCC): ICD-10-CM

## 2020-09-27 RX ORDER — FLECAINIDE ACETATE 100 MG/1
TABLET ORAL
Qty: 180 TABLET | Refills: 1 | Status: SHIPPED | OUTPATIENT
Start: 2020-09-27 | End: 2021-04-21

## 2020-09-27 RX ORDER — PRAVASTATIN SODIUM 40 MG
TABLET ORAL
Qty: 90 TABLET | Refills: 1 | Status: SHIPPED | OUTPATIENT
Start: 2020-09-27 | End: 2021-04-21

## 2020-09-28 DIAGNOSIS — F32.A DEPRESSION, UNSPECIFIED DEPRESSION TYPE: ICD-10-CM

## 2020-09-28 DIAGNOSIS — M51.26 LUMBAR HERNIATED DISC: ICD-10-CM

## 2020-09-28 RX ORDER — DULOXETIN HYDROCHLORIDE 60 MG/1
CAPSULE, DELAYED RELEASE ORAL
Qty: 90 CAPSULE | Refills: 0 | Status: SHIPPED | OUTPATIENT
Start: 2020-09-28 | End: 2021-01-25

## 2020-09-28 RX ORDER — CELECOXIB 200 MG/1
CAPSULE ORAL
Qty: 90 CAPSULE | Refills: 0 | Status: SHIPPED | OUTPATIENT
Start: 2020-09-28 | End: 2021-01-25

## 2020-09-28 RX ORDER — METOPROLOL SUCCINATE 100 MG/1
TABLET, EXTENDED RELEASE ORAL
Qty: 90 TABLET | Refills: 1 | Status: SHIPPED | OUTPATIENT
Start: 2020-09-28 | End: 2021-04-20

## 2020-09-30 ENCOUNTER — ANTICOAG VISIT (OUTPATIENT)
Dept: FAMILY MEDICINE CLINIC | Facility: CLINIC | Age: 70
End: 2020-09-30

## 2020-09-30 LAB
INR PPP: 2 (ref 0.8–1.2)
PROTHROMBIN TIME: 20.9 SEC (ref 9.1–12)

## 2020-09-30 NOTE — PROGRESS NOTES
Chrystal Frankel, MD  P [facility] Clinical               Call patient with lab result-   same

## 2020-10-14 ENCOUNTER — ANTICOAG VISIT (OUTPATIENT)
Dept: FAMILY MEDICINE CLINIC | Facility: CLINIC | Age: 70
End: 2020-10-14

## 2020-10-14 DIAGNOSIS — Z79.01 LONG TERM CURRENT USE OF ANTICOAGULANT: Primary | ICD-10-CM

## 2020-10-14 LAB
INR PPP: 1.7 (ref 0.9–1.2)
PROTHROMBIN TIME: 17.7 SEC (ref 9.1–12)

## 2020-10-15 ENCOUNTER — TELEPHONE (OUTPATIENT)
Dept: OBGYN CLINIC | Facility: HOSPITAL | Age: 70
End: 2020-10-15

## 2020-10-16 ENCOUNTER — APPOINTMENT (OUTPATIENT)
Dept: RADIOLOGY | Facility: CLINIC | Age: 70
End: 2020-10-16
Payer: COMMERCIAL

## 2020-10-16 ENCOUNTER — OFFICE VISIT (OUTPATIENT)
Dept: OBGYN CLINIC | Facility: CLINIC | Age: 70
End: 2020-10-16
Payer: COMMERCIAL

## 2020-10-16 VITALS — HEIGHT: 72 IN | WEIGHT: 263 LBS | BODY MASS INDEX: 35.62 KG/M2 | TEMPERATURE: 96.9 F

## 2020-10-16 DIAGNOSIS — M25.551 PAIN IN RIGHT HIP: ICD-10-CM

## 2020-10-16 DIAGNOSIS — M70.61 GREATER TROCHANTERIC BURSITIS OF RIGHT HIP: Primary | ICD-10-CM

## 2020-10-16 DIAGNOSIS — M16.11 ARTHRITIS OF RIGHT HIP: ICD-10-CM

## 2020-10-16 PROCEDURE — 20610 DRAIN/INJ JOINT/BURSA W/O US: CPT | Performed by: ORTHOPAEDIC SURGERY

## 2020-10-16 PROCEDURE — 1160F RVW MEDS BY RX/DR IN RCRD: CPT | Performed by: ORTHOPAEDIC SURGERY

## 2020-10-16 PROCEDURE — S0020 INJECTION, BUPIVICAINE HYDRO: HCPCS | Performed by: ORTHOPAEDIC SURGERY

## 2020-10-16 PROCEDURE — 1036F TOBACCO NON-USER: CPT | Performed by: ORTHOPAEDIC SURGERY

## 2020-10-16 PROCEDURE — 73502 X-RAY EXAM HIP UNI 2-3 VIEWS: CPT

## 2020-10-16 PROCEDURE — 99214 OFFICE O/P EST MOD 30 MIN: CPT | Performed by: ORTHOPAEDIC SURGERY

## 2020-10-16 RX ORDER — BUPIVACAINE HYDROCHLORIDE 2.5 MG/ML
4 INJECTION, SOLUTION INFILTRATION; PERINEURAL
Status: COMPLETED | OUTPATIENT
Start: 2020-10-16 | End: 2020-10-16

## 2020-10-16 RX ORDER — METHYLPREDNISOLONE ACETATE 40 MG/ML
1 INJECTION, SUSPENSION INTRA-ARTICULAR; INTRALESIONAL; INTRAMUSCULAR; SOFT TISSUE
Status: COMPLETED | OUTPATIENT
Start: 2020-10-16 | End: 2020-10-16

## 2020-10-16 RX ADMIN — BUPIVACAINE HYDROCHLORIDE 4 ML: 2.5 INJECTION, SOLUTION INFILTRATION; PERINEURAL at 11:54

## 2020-10-16 RX ADMIN — METHYLPREDNISOLONE ACETATE 1 ML: 40 INJECTION, SUSPENSION INTRA-ARTICULAR; INTRALESIONAL; INTRAMUSCULAR; SOFT TISSUE at 11:54

## 2020-10-22 ENCOUNTER — EVALUATION (OUTPATIENT)
Dept: PHYSICAL THERAPY | Facility: CLINIC | Age: 70
End: 2020-10-22
Payer: COMMERCIAL

## 2020-10-22 DIAGNOSIS — M70.61 GREATER TROCHANTERIC BURSITIS OF RIGHT HIP: ICD-10-CM

## 2020-10-22 DIAGNOSIS — M16.11 ARTHRITIS OF RIGHT HIP: ICD-10-CM

## 2020-10-22 PROCEDURE — 97110 THERAPEUTIC EXERCISES: CPT | Performed by: PHYSICAL THERAPIST

## 2020-10-22 PROCEDURE — 97162 PT EVAL MOD COMPLEX 30 MIN: CPT | Performed by: PHYSICAL THERAPIST

## 2020-10-27 ENCOUNTER — OFFICE VISIT (OUTPATIENT)
Dept: FAMILY MEDICINE CLINIC | Facility: CLINIC | Age: 70
End: 2020-10-27
Payer: COMMERCIAL

## 2020-10-27 VITALS
OXYGEN SATURATION: 96 % | HEART RATE: 71 BPM | DIASTOLIC BLOOD PRESSURE: 80 MMHG | BODY MASS INDEX: 34.62 KG/M2 | HEIGHT: 72 IN | TEMPERATURE: 98.4 F | WEIGHT: 255.6 LBS | SYSTOLIC BLOOD PRESSURE: 130 MMHG

## 2020-10-27 DIAGNOSIS — G47.33 OSA (OBSTRUCTIVE SLEEP APNEA): ICD-10-CM

## 2020-10-27 DIAGNOSIS — Z23 NEED FOR VACCINATION: ICD-10-CM

## 2020-10-27 DIAGNOSIS — F32.1 DEPRESSION, MAJOR, SINGLE EPISODE, MODERATE (HCC): ICD-10-CM

## 2020-10-27 DIAGNOSIS — I48.0 PAROXYSMAL ATRIAL FIBRILLATION (HCC): ICD-10-CM

## 2020-10-27 DIAGNOSIS — R41.3 MEMORY DYSFUNCTION: ICD-10-CM

## 2020-10-27 DIAGNOSIS — Z00.00 MEDICARE ANNUAL WELLNESS VISIT, SUBSEQUENT: Primary | ICD-10-CM

## 2020-10-27 DIAGNOSIS — E78.2 MIXED HYPERLIPIDEMIA: ICD-10-CM

## 2020-10-27 DIAGNOSIS — I10 ESSENTIAL HYPERTENSION: ICD-10-CM

## 2020-10-27 DIAGNOSIS — D68.51 FACTOR V LEIDEN MUTATION (HCC): ICD-10-CM

## 2020-10-27 PROCEDURE — 1170F FXNL STATUS ASSESSED: CPT | Performed by: FAMILY MEDICINE

## 2020-10-27 PROCEDURE — 3075F SYST BP GE 130 - 139MM HG: CPT | Performed by: FAMILY MEDICINE

## 2020-10-27 PROCEDURE — 1160F RVW MEDS BY RX/DR IN RCRD: CPT | Performed by: FAMILY MEDICINE

## 2020-10-27 PROCEDURE — 3079F DIAST BP 80-89 MM HG: CPT | Performed by: FAMILY MEDICINE

## 2020-10-27 PROCEDURE — 3725F SCREEN DEPRESSION PERFORMED: CPT | Performed by: FAMILY MEDICINE

## 2020-10-27 PROCEDURE — 90662 IIV NO PRSV INCREASED AG IM: CPT

## 2020-10-27 PROCEDURE — G0008 ADMIN INFLUENZA VIRUS VAC: HCPCS

## 2020-10-27 PROCEDURE — G0439 PPPS, SUBSEQ VISIT: HCPCS | Performed by: FAMILY MEDICINE

## 2020-10-27 PROCEDURE — 3008F BODY MASS INDEX DOCD: CPT | Performed by: FAMILY MEDICINE

## 2020-10-27 PROCEDURE — 1125F AMNT PAIN NOTED PAIN PRSNT: CPT | Performed by: FAMILY MEDICINE

## 2020-10-27 PROCEDURE — 99214 OFFICE O/P EST MOD 30 MIN: CPT | Performed by: FAMILY MEDICINE

## 2020-10-27 RX ORDER — CHLORAL HYDRATE 500 MG
CAPSULE ORAL
COMMUNITY
End: 2020-12-28 | Stop reason: ALTCHOICE

## 2020-10-28 ENCOUNTER — OFFICE VISIT (OUTPATIENT)
Dept: PHYSICAL THERAPY | Facility: CLINIC | Age: 70
End: 2020-10-28
Payer: COMMERCIAL

## 2020-10-28 DIAGNOSIS — M70.61 GREATER TROCHANTERIC BURSITIS OF RIGHT HIP: Primary | ICD-10-CM

## 2020-10-28 DIAGNOSIS — M16.11 ARTHRITIS OF RIGHT HIP: ICD-10-CM

## 2020-10-28 PROCEDURE — 97110 THERAPEUTIC EXERCISES: CPT

## 2020-10-28 PROCEDURE — 97112 NEUROMUSCULAR REEDUCATION: CPT

## 2020-10-30 ENCOUNTER — OFFICE VISIT (OUTPATIENT)
Dept: PHYSICAL THERAPY | Facility: CLINIC | Age: 70
End: 2020-10-30
Payer: COMMERCIAL

## 2020-10-30 DIAGNOSIS — M70.61 GREATER TROCHANTERIC BURSITIS OF RIGHT HIP: Primary | ICD-10-CM

## 2020-10-30 DIAGNOSIS — M16.11 ARTHRITIS OF RIGHT HIP: ICD-10-CM

## 2020-10-30 PROCEDURE — 97110 THERAPEUTIC EXERCISES: CPT

## 2020-10-30 PROCEDURE — 97112 NEUROMUSCULAR REEDUCATION: CPT

## 2020-11-04 ENCOUNTER — APPOINTMENT (OUTPATIENT)
Dept: PHYSICAL THERAPY | Facility: CLINIC | Age: 70
End: 2020-11-04
Payer: COMMERCIAL

## 2020-11-05 ENCOUNTER — ANTICOAG VISIT (OUTPATIENT)
Dept: FAMILY MEDICINE CLINIC | Facility: CLINIC | Age: 70
End: 2020-11-05

## 2020-11-05 LAB
INR PPP: 2.4 (ref 0.9–1.2)
PROTHROMBIN TIME: 24 SEC (ref 9.1–12)

## 2020-11-06 ENCOUNTER — APPOINTMENT (OUTPATIENT)
Dept: PHYSICAL THERAPY | Facility: CLINIC | Age: 70
End: 2020-11-06
Payer: COMMERCIAL

## 2020-11-06 LAB
ALBUMIN SERPL-MCNC: 4.1 G/DL (ref 3.8–4.8)
ALBUMIN/GLOB SERPL: 1.6 {RATIO} (ref 1.2–2.2)
ALP SERPL-CCNC: 74 IU/L (ref 39–117)
ALT SERPL-CCNC: 20 IU/L (ref 0–44)
AST SERPL-CCNC: 23 IU/L (ref 0–40)
BASOPHILS # BLD AUTO: 0 X10E3/UL (ref 0–0.2)
BASOPHILS NFR BLD AUTO: 0 %
BILIRUB SERPL-MCNC: 0.6 MG/DL (ref 0–1.2)
BUN SERPL-MCNC: 15 MG/DL (ref 8–27)
BUN/CREAT SERPL: 18 (ref 10–24)
CALCIUM SERPL-MCNC: 9.3 MG/DL (ref 8.6–10.2)
CHLORIDE SERPL-SCNC: 99 MMOL/L (ref 96–106)
CHOLEST SERPL-MCNC: 213 MG/DL (ref 100–199)
CK SERPL-CCNC: 289 U/L (ref 41–331)
CO2 SERPL-SCNC: 27 MMOL/L (ref 20–29)
CREAT SERPL-MCNC: 0.84 MG/DL (ref 0.76–1.27)
EOSINOPHIL # BLD AUTO: 0.1 X10E3/UL (ref 0–0.4)
EOSINOPHIL NFR BLD AUTO: 2 %
ERYTHROCYTE [DISTWIDTH] IN BLOOD BY AUTOMATED COUNT: 13.1 % (ref 11.6–15.4)
GLOBULIN SER-MCNC: 2.6 G/DL (ref 1.5–4.5)
GLUCOSE SERPL-MCNC: 115 MG/DL (ref 65–99)
HCT VFR BLD AUTO: 42.8 % (ref 37.5–51)
HDLC SERPL-MCNC: 65 MG/DL
HGB BLD-MCNC: 14.1 G/DL (ref 13–17.7)
IMM GRANULOCYTES # BLD: 0 X10E3/UL (ref 0–0.1)
IMM GRANULOCYTES NFR BLD: 0 %
LDLC SERPL CALC-MCNC: 128 MG/DL (ref 0–99)
LDLC/HDLC SERPL: 2 RATIO (ref 0–3.6)
LYMPHOCYTES # BLD AUTO: 1.7 X10E3/UL (ref 0.7–3.1)
LYMPHOCYTES NFR BLD AUTO: 39 %
MCH RBC QN AUTO: 30.9 PG (ref 26.6–33)
MCHC RBC AUTO-ENTMCNC: 32.9 G/DL (ref 31.5–35.7)
MCV RBC AUTO: 94 FL (ref 79–97)
MONOCYTES # BLD AUTO: 0.4 X10E3/UL (ref 0.1–0.9)
MONOCYTES NFR BLD AUTO: 9 %
NEUTROPHILS # BLD AUTO: 2.3 X10E3/UL (ref 1.4–7)
NEUTROPHILS NFR BLD AUTO: 50 %
PLATELET # BLD AUTO: 190 X10E3/UL (ref 150–450)
POTASSIUM SERPL-SCNC: 4.5 MMOL/L (ref 3.5–5.2)
PROT SERPL-MCNC: 6.7 G/DL (ref 6–8.5)
RBC # BLD AUTO: 4.56 X10E6/UL (ref 4.14–5.8)
SL AMB EGFR AFRICAN AMERICAN: 103 ML/MIN/1.73
SL AMB EGFR NON AFRICAN AMERICAN: 89 ML/MIN/1.73
SL AMB VLDL CHOLESTEROL CALC: 20 MG/DL (ref 5–40)
SODIUM SERPL-SCNC: 137 MMOL/L (ref 134–144)
TRIGL SERPL-MCNC: 116 MG/DL (ref 0–149)
TSH SERPL DL<=0.005 MIU/L-ACNC: 1.99 UIU/ML (ref 0.45–4.5)
WBC # BLD AUTO: 4.5 X10E3/UL (ref 3.4–10.8)

## 2020-11-09 ENCOUNTER — TELEPHONE (OUTPATIENT)
Dept: FAMILY MEDICINE CLINIC | Facility: CLINIC | Age: 70
End: 2020-11-09

## 2020-11-11 ENCOUNTER — OFFICE VISIT (OUTPATIENT)
Dept: PHYSICAL THERAPY | Facility: CLINIC | Age: 70
End: 2020-11-11
Payer: COMMERCIAL

## 2020-11-11 DIAGNOSIS — M16.11 ARTHRITIS OF RIGHT HIP: ICD-10-CM

## 2020-11-11 DIAGNOSIS — M70.61 GREATER TROCHANTERIC BURSITIS OF RIGHT HIP: Primary | ICD-10-CM

## 2020-11-11 PROCEDURE — 97140 MANUAL THERAPY 1/> REGIONS: CPT | Performed by: PHYSICAL THERAPIST

## 2020-11-11 PROCEDURE — 97110 THERAPEUTIC EXERCISES: CPT | Performed by: PHYSICAL THERAPIST

## 2020-11-11 PROCEDURE — 97112 NEUROMUSCULAR REEDUCATION: CPT | Performed by: PHYSICAL THERAPIST

## 2020-11-18 ENCOUNTER — EVALUATION (OUTPATIENT)
Dept: PHYSICAL THERAPY | Facility: CLINIC | Age: 70
End: 2020-11-18
Payer: COMMERCIAL

## 2020-11-18 DIAGNOSIS — N13.8 BPH WITH OBSTRUCTION/LOWER URINARY TRACT SYMPTOMS: ICD-10-CM

## 2020-11-18 DIAGNOSIS — N40.1 BPH WITH OBSTRUCTION/LOWER URINARY TRACT SYMPTOMS: ICD-10-CM

## 2020-11-18 DIAGNOSIS — M70.61 GREATER TROCHANTERIC BURSITIS OF RIGHT HIP: Primary | ICD-10-CM

## 2020-11-18 DIAGNOSIS — M16.11 ARTHRITIS OF RIGHT HIP: ICD-10-CM

## 2020-11-18 PROCEDURE — 97110 THERAPEUTIC EXERCISES: CPT | Performed by: PHYSICAL THERAPIST

## 2020-11-18 RX ORDER — TAMSULOSIN HYDROCHLORIDE 0.4 MG/1
0.4 CAPSULE ORAL
Qty: 90 CAPSULE | Refills: 0 | Status: SHIPPED | OUTPATIENT
Start: 2020-11-18 | End: 2021-01-25

## 2020-11-19 DIAGNOSIS — G47.00 INSOMNIA, UNSPECIFIED TYPE: ICD-10-CM

## 2020-11-23 RX ORDER — ZOLPIDEM TARTRATE 10 MG/1
TABLET ORAL
Qty: 90 TABLET | Refills: 0 | Status: SHIPPED | OUTPATIENT
Start: 2020-11-23 | End: 2021-02-12

## 2020-11-25 ENCOUNTER — ANTICOAG VISIT (OUTPATIENT)
Dept: FAMILY MEDICINE CLINIC | Facility: CLINIC | Age: 70
End: 2020-11-25

## 2020-11-25 ENCOUNTER — TELEPHONE (OUTPATIENT)
Dept: FAMILY MEDICINE CLINIC | Facility: CLINIC | Age: 70
End: 2020-11-25

## 2020-11-25 DIAGNOSIS — Z79.01 LONG TERM CURRENT USE OF ANTICOAGULANT: ICD-10-CM

## 2020-11-25 DIAGNOSIS — I48.0 PAROXYSMAL ATRIAL FIBRILLATION (HCC): Primary | ICD-10-CM

## 2020-11-25 DIAGNOSIS — D68.51 FACTOR V LEIDEN MUTATION (HCC): ICD-10-CM

## 2020-11-25 LAB
INR PPP: 1.6 (ref 0.9–1.2)
PROTHROMBIN TIME: 17 SEC (ref 9.1–12)

## 2020-12-03 ENCOUNTER — OFFICE VISIT (OUTPATIENT)
Dept: OBGYN CLINIC | Facility: CLINIC | Age: 70
End: 2020-12-03
Payer: COMMERCIAL

## 2020-12-03 ENCOUNTER — PREP FOR PROCEDURE (OUTPATIENT)
Dept: OBGYN CLINIC | Facility: CLINIC | Age: 70
End: 2020-12-03

## 2020-12-03 VITALS
SYSTOLIC BLOOD PRESSURE: 120 MMHG | BODY MASS INDEX: 34.54 KG/M2 | TEMPERATURE: 97.5 F | HEIGHT: 72 IN | DIASTOLIC BLOOD PRESSURE: 80 MMHG | WEIGHT: 255 LBS

## 2020-12-03 DIAGNOSIS — Z01.818 PREOP TESTING: ICD-10-CM

## 2020-12-03 DIAGNOSIS — M17.11 ARTHRITIS OF RIGHT KNEE: ICD-10-CM

## 2020-12-03 DIAGNOSIS — Z01.812 PRE-OPERATIVE LABORATORY EXAMINATION: Primary | ICD-10-CM

## 2020-12-03 DIAGNOSIS — M17.11 ARTHRITIS OF RIGHT KNEE: Primary | ICD-10-CM

## 2020-12-03 PROCEDURE — 20610 DRAIN/INJ JOINT/BURSA W/O US: CPT | Performed by: ORTHOPAEDIC SURGERY

## 2020-12-03 PROCEDURE — 99214 OFFICE O/P EST MOD 30 MIN: CPT | Performed by: ORTHOPAEDIC SURGERY

## 2020-12-03 RX ORDER — CHLORHEXIDINE GLUCONATE 0.12 MG/ML
15 RINSE ORAL ONCE
Status: CANCELLED | OUTPATIENT
Start: 2020-12-03 | End: 2020-12-03

## 2020-12-03 RX ORDER — CHLORHEXIDINE GLUCONATE 4 G/100ML
SOLUTION TOPICAL DAILY PRN
Status: CANCELLED | OUTPATIENT
Start: 2020-12-03

## 2020-12-03 RX ORDER — MULTIVIT-MIN/IRON FUM/FOLIC AC 7.5 MG-4
1 TABLET ORAL DAILY
Qty: 30 TABLET | Refills: 0 | Status: SHIPPED | OUTPATIENT
Start: 2020-12-03 | End: 2020-12-28

## 2020-12-03 RX ORDER — ASCORBIC ACID 500 MG
500 TABLET ORAL 2 TIMES DAILY
Qty: 60 TABLET | Refills: 0 | Status: SHIPPED | OUTPATIENT
Start: 2020-12-03 | End: 2021-01-15

## 2020-12-03 RX ORDER — METHYLPREDNISOLONE ACETATE 40 MG/ML
1 INJECTION, SUSPENSION INTRA-ARTICULAR; INTRALESIONAL; INTRAMUSCULAR; SOFT TISSUE
Status: DISCONTINUED | OUTPATIENT
Start: 2020-12-03 | End: 2022-03-29 | Stop reason: HOSPADM

## 2020-12-03 RX ORDER — BUPIVACAINE HYDROCHLORIDE 2.5 MG/ML
4 INJECTION, SOLUTION INFILTRATION; PERINEURAL
Status: DISCONTINUED | OUTPATIENT
Start: 2020-12-03 | End: 2022-03-29 | Stop reason: HOSPADM

## 2020-12-03 RX ORDER — FERROUS SULFATE TAB EC 324 MG (65 MG FE EQUIVALENT) 324 (65 FE) MG
324 TABLET DELAYED RESPONSE ORAL
Qty: 60 TABLET | Refills: 0 | Status: SHIPPED | OUTPATIENT
Start: 2020-12-03 | End: 2020-12-28

## 2020-12-03 RX ORDER — FOLIC ACID 1 MG/1
1 TABLET ORAL DAILY
Qty: 30 TABLET | Refills: 0 | Status: SHIPPED | OUTPATIENT
Start: 2020-12-03 | End: 2020-12-28

## 2020-12-03 RX ADMIN — BUPIVACAINE HYDROCHLORIDE 4 ML: 2.5 INJECTION, SOLUTION INFILTRATION; PERINEURAL at 10:03

## 2020-12-03 RX ADMIN — METHYLPREDNISOLONE ACETATE 1 ML: 40 INJECTION, SUSPENSION INTRA-ARTICULAR; INTRALESIONAL; INTRAMUSCULAR; SOFT TISSUE at 10:03

## 2020-12-09 LAB
INR PPP: 1.9 (ref 0.9–1.2)
PROTHROMBIN TIME: 19.5 SEC (ref 9.1–12)

## 2020-12-10 ENCOUNTER — EVALUATION (OUTPATIENT)
Dept: PHYSICAL THERAPY | Facility: CLINIC | Age: 70
End: 2020-12-10
Payer: COMMERCIAL

## 2020-12-10 ENCOUNTER — ANTICOAG VISIT (OUTPATIENT)
Dept: FAMILY MEDICINE CLINIC | Facility: CLINIC | Age: 70
End: 2020-12-10

## 2020-12-10 DIAGNOSIS — Z01.818 PREOP TESTING: ICD-10-CM

## 2020-12-10 DIAGNOSIS — M17.11 ARTHRITIS OF RIGHT KNEE: ICD-10-CM

## 2020-12-10 LAB
ABO GROUP BLD: NORMAL
ALBUMIN SERPL-MCNC: 4.4 G/DL (ref 3.8–4.8)
ALBUMIN/GLOB SERPL: 1.7 {RATIO} (ref 1.2–2.2)
ALP SERPL-CCNC: 74 IU/L (ref 39–117)
ALT SERPL-CCNC: 15 IU/L (ref 0–44)
APTT PPP: 33 SEC (ref 24–33)
AST SERPL-CCNC: 17 IU/L (ref 0–40)
BASOPHILS # BLD AUTO: 0 X10E3/UL (ref 0–0.2)
BASOPHILS NFR BLD AUTO: 1 %
BILIRUB SERPL-MCNC: 0.4 MG/DL (ref 0–1.2)
BLD GP AB SCN SERPL QL: NEGATIVE
BUN SERPL-MCNC: 15 MG/DL (ref 8–27)
BUN/CREAT SERPL: 19 (ref 10–24)
CALCIUM SERPL-MCNC: 9.3 MG/DL (ref 8.6–10.2)
CHLORIDE SERPL-SCNC: 100 MMOL/L (ref 96–106)
CO2 SERPL-SCNC: 27 MMOL/L (ref 20–29)
CREAT SERPL-MCNC: 0.78 MG/DL (ref 0.76–1.27)
EOSINOPHIL # BLD AUTO: 0 X10E3/UL (ref 0–0.4)
EOSINOPHIL NFR BLD AUTO: 1 %
ERYTHROCYTE [DISTWIDTH] IN BLOOD BY AUTOMATED COUNT: 13.1 % (ref 11.6–15.4)
EST. AVERAGE GLUCOSE BLD GHB EST-MCNC: 131 MG/DL
GLOBULIN SER-MCNC: 2.6 G/DL (ref 1.5–4.5)
GLUCOSE SERPL-MCNC: 115 MG/DL (ref 65–99)
HBA1C MFR BLD: 6.2 % (ref 4.8–5.6)
HCT VFR BLD AUTO: 41.7 % (ref 37.5–51)
HGB BLD-MCNC: 14.3 G/DL (ref 13–17.7)
IMM GRANULOCYTES # BLD: 0 X10E3/UL (ref 0–0.1)
IMM GRANULOCYTES NFR BLD: 0 %
INR PPP: 1.9 (ref 0.9–1.2)
LYMPHOCYTES # BLD AUTO: 1.4 X10E3/UL (ref 0.7–3.1)
LYMPHOCYTES NFR BLD AUTO: 32 %
MCH RBC QN AUTO: 31.7 PG (ref 26.6–33)
MCHC RBC AUTO-ENTMCNC: 34.3 G/DL (ref 31.5–35.7)
MCV RBC AUTO: 93 FL (ref 79–97)
MONOCYTES # BLD AUTO: 0.4 X10E3/UL (ref 0.1–0.9)
MONOCYTES NFR BLD AUTO: 10 %
NEUTROPHILS # BLD AUTO: 2.4 X10E3/UL (ref 1.4–7)
NEUTROPHILS NFR BLD AUTO: 56 %
PLATELET # BLD AUTO: 204 X10E3/UL (ref 150–450)
POTASSIUM SERPL-SCNC: 4.6 MMOL/L (ref 3.5–5.2)
PROT SERPL-MCNC: 7 G/DL (ref 6–8.5)
PROTHROMBIN TIME: 19.6 SEC (ref 9.1–12)
RBC # BLD AUTO: 4.51 X10E6/UL (ref 4.14–5.8)
RH BLD: POSITIVE
SL AMB EGFR AFRICAN AMERICAN: 106 ML/MIN/1.73
SL AMB EGFR NON AFRICAN AMERICAN: 92 ML/MIN/1.73
SODIUM SERPL-SCNC: 139 MMOL/L (ref 134–144)
WBC # BLD AUTO: 4.3 X10E3/UL (ref 3.4–10.8)

## 2020-12-10 PROCEDURE — 97162 PT EVAL MOD COMPLEX 30 MIN: CPT | Performed by: PHYSICAL THERAPIST

## 2020-12-10 PROCEDURE — 97110 THERAPEUTIC EXERCISES: CPT | Performed by: PHYSICAL THERAPIST

## 2020-12-14 ENCOUNTER — PROCEDURE VISIT (OUTPATIENT)
Dept: FAMILY MEDICINE CLINIC | Facility: CLINIC | Age: 70
End: 2020-12-14
Payer: COMMERCIAL

## 2020-12-14 VITALS
HEIGHT: 72 IN | TEMPERATURE: 98.2 F | WEIGHT: 261 LBS | DIASTOLIC BLOOD PRESSURE: 80 MMHG | BODY MASS INDEX: 35.35 KG/M2 | SYSTOLIC BLOOD PRESSURE: 126 MMHG

## 2020-12-14 DIAGNOSIS — D68.51 FACTOR V LEIDEN MUTATION (HCC): ICD-10-CM

## 2020-12-14 DIAGNOSIS — I10 ESSENTIAL HYPERTENSION: ICD-10-CM

## 2020-12-14 DIAGNOSIS — F32.1 DEPRESSION, MAJOR, SINGLE EPISODE, MODERATE (HCC): ICD-10-CM

## 2020-12-14 DIAGNOSIS — I48.0 PAROXYSMAL ATRIAL FIBRILLATION (HCC): ICD-10-CM

## 2020-12-14 DIAGNOSIS — Z01.818 PRE-OP EXAMINATION: Primary | ICD-10-CM

## 2020-12-14 DIAGNOSIS — M17.11 ARTHRITIS OF RIGHT KNEE: ICD-10-CM

## 2020-12-14 PROCEDURE — 3074F SYST BP LT 130 MM HG: CPT | Performed by: FAMILY MEDICINE

## 2020-12-14 PROCEDURE — 3008F BODY MASS INDEX DOCD: CPT | Performed by: FAMILY MEDICINE

## 2020-12-14 PROCEDURE — 3079F DIAST BP 80-89 MM HG: CPT | Performed by: FAMILY MEDICINE

## 2020-12-14 PROCEDURE — 1036F TOBACCO NON-USER: CPT | Performed by: FAMILY MEDICINE

## 2020-12-14 PROCEDURE — 1160F RVW MEDS BY RX/DR IN RCRD: CPT | Performed by: FAMILY MEDICINE

## 2020-12-14 PROCEDURE — 99214 OFFICE O/P EST MOD 30 MIN: CPT | Performed by: FAMILY MEDICINE

## 2020-12-17 ENCOUNTER — TELEPHONE (OUTPATIENT)
Dept: OBGYN CLINIC | Facility: HOSPITAL | Age: 70
End: 2020-12-17

## 2020-12-21 ENCOUNTER — TELEPHONE (OUTPATIENT)
Dept: OBGYN CLINIC | Facility: CLINIC | Age: 70
End: 2020-12-21

## 2020-12-23 ENCOUNTER — TELEPHONE (OUTPATIENT)
Dept: OBGYN CLINIC | Facility: HOSPITAL | Age: 70
End: 2020-12-23

## 2020-12-23 ENCOUNTER — TELEPHONE (OUTPATIENT)
Dept: HEMATOLOGY ONCOLOGY | Facility: CLINIC | Age: 70
End: 2020-12-23

## 2020-12-23 ENCOUNTER — TELEPHONE (OUTPATIENT)
Dept: CARDIOLOGY CLINIC | Facility: CLINIC | Age: 70
End: 2020-12-23

## 2020-12-26 DIAGNOSIS — M17.11 ARTHRITIS OF RIGHT KNEE: ICD-10-CM

## 2020-12-26 DIAGNOSIS — Z01.818 PREOP TESTING: ICD-10-CM

## 2020-12-28 RX ORDER — FOLIC ACID 1 MG/1
TABLET ORAL
Qty: 30 TABLET | Refills: 0 | Status: SHIPPED | OUTPATIENT
Start: 2020-12-28 | End: 2021-06-14

## 2020-12-28 RX ORDER — FERROUS SULFATE TAB EC 324 MG (65 MG FE EQUIVALENT) 324 (65 FE) MG
324 TABLET DELAYED RESPONSE ORAL
Qty: 60 TABLET | Refills: 0 | Status: SHIPPED | OUTPATIENT
Start: 2020-12-28 | End: 2021-06-14

## 2020-12-28 RX ORDER — MULTIVITAMIN/IRON/FOLIC ACID 18MG-0.4MG
TABLET ORAL
Qty: 30 TABLET | Refills: 0 | Status: SHIPPED | OUTPATIENT
Start: 2020-12-28 | End: 2021-06-14

## 2020-12-31 ENCOUNTER — ANTICOAG VISIT (OUTPATIENT)
Dept: FAMILY MEDICINE CLINIC | Facility: CLINIC | Age: 70
End: 2020-12-31

## 2020-12-31 ENCOUNTER — TELEPHONE (OUTPATIENT)
Dept: FAMILY MEDICINE CLINIC | Facility: CLINIC | Age: 70
End: 2020-12-31

## 2020-12-31 LAB
INR PPP: 2.3 (ref 0.9–1.2)
PROTHROMBIN TIME: 23 SEC (ref 9.1–12)

## 2021-01-05 DIAGNOSIS — M17.11 ARTHRITIS OF RIGHT KNEE: Primary | ICD-10-CM

## 2021-01-05 RX ORDER — MULTIVITAMIN
1 TABLET ORAL DAILY
Qty: 30 TABLET | Refills: 0 | Status: SHIPPED | OUTPATIENT
Start: 2021-01-05 | End: 2021-06-14

## 2021-01-05 RX ORDER — FOLIC ACID 1 MG/1
1 TABLET ORAL DAILY
Qty: 30 TABLET | Refills: 0 | Status: SHIPPED | OUTPATIENT
Start: 2021-01-05 | End: 2021-06-14

## 2021-01-05 RX ORDER — FERROUS SULFATE TAB EC 324 MG (65 MG FE EQUIVALENT) 324 (65 FE) MG
324 TABLET DELAYED RESPONSE ORAL
Qty: 60 TABLET | Refills: 0 | Status: SHIPPED | OUTPATIENT
Start: 2021-01-05 | End: 2021-06-14

## 2021-01-05 RX ORDER — ASCORBIC ACID 500 MG
500 TABLET ORAL DAILY
Qty: 30 TABLET | Refills: 0 | Status: SHIPPED | OUTPATIENT
Start: 2021-01-05 | End: 2021-06-14

## 2021-01-15 DIAGNOSIS — M17.11 ARTHRITIS OF RIGHT KNEE: ICD-10-CM

## 2021-01-15 DIAGNOSIS — Z01.818 PREOP TESTING: ICD-10-CM

## 2021-01-15 RX ORDER — LORATADINE 10 MG
TABLET ORAL
Qty: 100 TABLET | Refills: 0 | Status: ON HOLD | OUTPATIENT
Start: 2021-01-15 | End: 2022-04-22 | Stop reason: ALTCHOICE

## 2021-01-18 ENCOUNTER — TELEPHONE (OUTPATIENT)
Dept: OTHER | Facility: HOSPITAL | Age: 71
End: 2021-01-18

## 2021-01-18 ENCOUNTER — TELEPHONE (OUTPATIENT)
Dept: HEMATOLOGY ONCOLOGY | Facility: CLINIC | Age: 71
End: 2021-01-18

## 2021-01-18 NOTE — TELEPHONE ENCOUNTER
----- Message from Will Singh MA sent at 1/18/2021  9:09 AM EST -----  Patient has run into issues and is unable to move forward with surgery next week  His wife is gravely ill and he wants to focus on her at this time  With the surgery being cx'd he was wondering if you could call him and talk about injections instead  He wanted your opinion about seeing the hematologist too  Can you call him, when you have a chance

## 2021-01-18 NOTE — PROGRESS NOTES
Returned patietn call regarding his TKA  He would like to cancel surgery at this time due to his taking care of his wife and she is now on oxygen  We discussed that TKA is elective and certainly if this is not a good time, we can reschedule at a time that is better for he and his family  We can certainly continue with CSI, every 3 months, as they do provide some relief  All questions were answered  Alex Harding MD  Adult Reconstruction  Department of [facility] [address]  01/18/21  9:44 AM

## 2021-01-19 ENCOUNTER — CONSULT (OUTPATIENT)
Dept: HEMATOLOGY ONCOLOGY | Facility: CLINIC | Age: 71
End: 2021-01-19
Payer: COMMERCIAL

## 2021-01-19 ENCOUNTER — TELEPHONE (OUTPATIENT)
Dept: HEMATOLOGY ONCOLOGY | Facility: MEDICAL CENTER | Age: 71
End: 2021-01-19

## 2021-01-19 VITALS
SYSTOLIC BLOOD PRESSURE: 142 MMHG | DIASTOLIC BLOOD PRESSURE: 82 MMHG | OXYGEN SATURATION: 97 % | HEIGHT: 72 IN | TEMPERATURE: 96.3 F | RESPIRATION RATE: 16 BRPM | WEIGHT: 265.8 LBS | HEART RATE: 69 BPM | BODY MASS INDEX: 36 KG/M2

## 2021-01-19 DIAGNOSIS — Z86.718 HISTORY OF DVT OF LOWER EXTREMITY: ICD-10-CM

## 2021-01-19 DIAGNOSIS — D68.51 FACTOR V LEIDEN MUTATION (HCC): ICD-10-CM

## 2021-01-19 DIAGNOSIS — Z78.9 NEED FOR FOLLOW-UP BY SOCIAL WORKER: Primary | ICD-10-CM

## 2021-01-19 PROCEDURE — 3079F DIAST BP 80-89 MM HG: CPT | Performed by: INTERNAL MEDICINE

## 2021-01-19 PROCEDURE — 99204 OFFICE O/P NEW MOD 45 MIN: CPT | Performed by: INTERNAL MEDICINE

## 2021-01-19 PROCEDURE — 3077F SYST BP >= 140 MM HG: CPT | Performed by: INTERNAL MEDICINE

## 2021-01-19 PROCEDURE — 3008F BODY MASS INDEX DOCD: CPT | Performed by: INTERNAL MEDICINE

## 2021-01-19 NOTE — PROGRESS NOTES
Consultation - Medical Oncology   Gina Hooper 72 y o  male MRN: [id number]  Unit/Bed#:  Encounter: [id number]    Referring physician:  Dr Jeffrey Nguyen  Reason for Consult:  Factor 5 Leiden mutation  History of DVTs  Long-term anticoagulation  Patient will be going for right knee replacement  HPI: Gina Hopoer is a 72y o  year old male   He gives history of homozygous factor 5 Leiden mutation  He states his brother has heterozygous factor 5 Leiden mutation  Several years ago he had 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 at 2 different times and since then he has been on Coumadin  He remembers giving shots himself in the belly  He is scheduled to have right knee replacement on 01/27/2021  No history of bleeding or blood clot on Coumadin  History of atrial fibrillation and he had ablation  Remote past history of colon surgery for an abscess  He states there was no cancer  He states he did not have blood clot at that time  History of arthritis in his hips and knees  History of sleep apnea  History of dyslipidemia  History of pre diabetes    ROS:  01/19/21 Reviewed 13 systems: See symptoms in HPI  Presently no headaches, seizures, dizziness, diplopia, dysphagia, hoarseness, chest pain, palpitations, shortness of breath, cough, hemoptysis, abdominal pain, nausea, vomiting, change in bowel habits, melena, hematuria, fever, chills, bleeding, bone pains, skin rash, weight loss,  tiredness , weakness, numbness, claudication and gait problem  No frequent infections  Not unusually sensitive to heat or cold  No swelling of the ankles  No swollen glands  Patient is anxious         Historical Information   Past Medical History:   Diagnosis Date    Acute venous embolism and thrombosis of deep vessels of proximal lower extremity (HCC)     Last assessed: 5/18/15    Arthritis     Cellulitis     LE    CPAP (continuous positive airway pressure) dependence     Factor V Leiden ([facility] [address] )     Forgetfulness     [place] (hard of hearing)     Paroxysmal atrial fibrillation (HCC)     Last assessed: 11/2/15    Sleep apnea      Past Surgical History:   Procedure Laterality Date    BACK SURGERY      Lower    COLON SURGERY      COLONOSCOPY       Social History   Social History     Substance and Sexual Activity   Alcohol Use Yes    Frequency: 4 or more times a week    Drinks per session: 1 or 2    Binge frequency: Never     Social History     Substance and Sexual Activity   Drug Use No     Social History     Tobacco Use   Smoking Status Never Smoker   Smokeless Tobacco Never Used     Family History:   Family History   Problem Relation Age of Onset    Lung cancer Mother     No Known Problems Father     Heart attack Brother     Atrial fibrillation Brother     Emphysema Sister          Current Outpatient Medications:     ascorbic acid (VITAMIN C) 500 mg tablet, Take 1 tablet (500 mg total) by mouth daily Begin taking 30 days before scheduled surgery  , Disp: 30 tablet, Rfl: 0    celecoxib (CeleBREX) 200 mg capsule, TAKE 1 CAPSULE BY MOUTH  DAILY, Disp: 90 capsule, Rfl: 0    [employer] Vitamin C 500 MG tablet, TAKE 1 TABLET BY MOUTH 2 TIMES A DAY, Disp: 100 tablet, Rfl: 0    Diclofenac Sodium (VOLTAREN EX), Apply topically, Disp: , Rfl:     DULoxetine (CYMBALTA) 60 mg delayed release capsule, TAKE 1 CAPSULE BY MOUTH  DAILY, Disp: 90 capsule, Rfl: 0    ferrous sulfate 324 (65 Fe) mg, TAKE 1 TABLET (324 MG TOTAL) BY MOUTH 2 (TWO) TIMES A DAY BEFORE MEALS, Disp: 60 tablet, Rfl: 0    ferrous sulfate 324 (65 Fe) mg, Take 1 tablet (324 mg total) by mouth daily before breakfast Begin taking 30 days before scheduled surgery  , Disp: 60 tablet, Rfl: 0    flecainide (TAMBOCOR) 100 mg tablet, TAKE 1 TABLET BY MOUTH  TWICE DAILY, Disp: 180 tablet, Rfl: 1    folic acid (FOLVITE) 1 mg tablet, TAKE 1 TABLET BY MOUTH EVERY DAY, Disp: 30 tablet, Rfl: 0    folic acid (FOLVITE) 1 mg tablet, Take 1 tablet (1 mg total) by mouth daily Begin taking 30 days before scheduled surgery  , Disp: 30 tablet, Rfl: 0    metoprolol succinate (TOPROL-XL) 100 mg 24 hr tablet, TAKE 1 TABLET BY MOUTH  DAILY, Disp: 90 tablet, Rfl: 1    Multiple Vitamin (multivitamin) tablet, Take 1 tablet by mouth daily Begin taking 30 days before scheduled surgery  , Disp: 30 tablet, Rfl: 0    Multiple Vitamins-Minerals (CVS Spectravite Ultra Mens) TABS, TAKE 1 TABLET BY MOUTH EVERY DAY, Disp: 30 tablet, Rfl: 0    pravastatin (PRAVACHOL) 40 mg tablet, TAKE 1 TABLET BY MOUTH  DAILY, Disp: 90 tablet, Rfl: 1    tamsulosin (FLOMAX) 0 4 mg, Take 1 capsule (0 4 mg total) by mouth daily with dinner, Disp: 90 capsule, Rfl: 0    warfarin (COUMADIN) 5 mg tablet, Take warfarin dose daily as directed by PT/INR  (Patient taking differently: Take warfarin dose daily as directed by PT/INR   5 mg wed & sat, 2 5 mg rest of week), Disp: 90 tablet, Rfl: 1    zolpidem (AMBIEN) 10 mg tablet, TAKE 1 TABLET BY MOUTH  DAILY AT BEDTIME AS NEEDED  FOR SLEEP, Disp: 90 tablet, Rfl: 0    Current Facility-Administered Medications:     bupivacaine (MARCAINE) 0 25 % injection 4 mL, 4 mL, Injection, , Veda Patel MD, 4 mL at 12/03/20 1003    methylPREDNISolone acetate (DEPO-MEDROL) injection 1 mL, 1 mL, Intra-articular, , Veda Patel MD, 1 mL at 12/03/20 1003    Allergies   Allergen Reactions    Morphine GI Intolerance    Vitamin K Other (See Comments)     On coumadin-patient sensitive to vitamin K amounts in meds etc      @ ROS@  Physical Exam:  Vitals:    01/19/21 0910   BP: 142/82   BP Location: Left arm   Patient Position: Sitting   Cuff Size: Large   Pulse: 69   Resp: 16   Temp: (!) 96 3 °F (35 7 °C)   TempSrc: Tympanic   SpO2: 97%   Weight: 121 kg (265 lb 12 8 oz)   Height: 6' (1 829 m)     Alert, oriented, not in distress, no icterus, no oral thrush, no palpable neck mass, clear lung fields, regular heart rate, abdomen  soft and non tender, no palpable abdominal mass, no ascites, no edema of ankles, no calf tenderness, no focal neurological deficit, no skin rash, no palpable lymphadenopathy, good arterial pulses, no clubbing  Patient is anxious  Performance status 1  Lab Results: I have reviewed all pertinent labs  LABS:  Results for orders placed or performed in visit on 12/09/20   Hemoglobin A1C   Result Value Ref Range    Hemoglobin A1C 6 2 (H) 4 8 - 5 6 %    Estimated Average Glucose 131 mg/dL         Imaging Studies: I have personally reviewed pertinent reports  Pathology, and Other Studies: I have personally reviewed pertinent reports  Assessment and Plan:    He gives history of homozygous factor 5 Leiden mutation  He states his brother has heterozygous factor 5 Leiden mutation  Several years ago he had 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 at 2 different times and since then he has been on Coumadin  He remembers giving shots himself in the belly  He is scheduled to have right knee replacement on 01/27/2021  No history of bleeding or blood clot on Coumadin  History of atrial fibrillation and he had ablation  Remote past history of colon surgery for an abscess  He states there was no cancer  He states he did not have blood clot at that time  History of arthritis in his hips and knees  History of sleep apnea  History of dyslipidemia  History of pre diabetes  Physical examination and test results are as recorded and discussed  History of factor 5 Leiden mutation, DVTs, atrial fibrillation and long-term anticoagulation detailed above  For right  knee replacement I have suggested bridging from Coumadin to Lovenox pre surgery and then bridging back from Lovenox to Coumadin post surgery  Bridging back will be by patient's surgeon and Dr Agustin Kidd  Long-term anticoagulation, monitoring of PT INR and Coumadin dose will be by patient's primary physician Dr Agustin Kidd as before  See instructions below  I called in 20 doses of 120 mg Lovenox to his pharmacy    I gave instructions to him in very much detail  Questions answered     No Coumadin after 01/22/2021  He takes Coumadin in the evening  No anticoagulation on 1/23/21  He will have Lovenox 120 mg subcutaneously every 12 hour on 01/24/2021 and on 01/25/2021  No anticoagulation on 01/26 /21 and 1/27/21  Surgery on 01/27/2021  After that instructions will come from Dr Sarah Gilbert and patient's surgeon to bridge him back from Lovenox to Coumadin  Discussed the importance of eating healthy foods not rich in vitamin K, activities as tolerated and later on health screening tests  Patient is capable of self-care  Goal will be to prevent blood clot and bleeding  Discussed drug and Coumadin interaction and food and Coumadin interaction  1  Factor V Leiden mutation ([address] )      2  History of DVT of lower extremity    3  Need for follow-up by     - Ambulatory referral to social work care management program; Future     Last dose of Coumadin on 01/22 /21  Lovenox  on 01/ 24 / 21 and 01/25/2021  No Lovenox on 01/26/2021 and 01/27/2021  Surgery is on 01/27/2021  After that instructions will  Come from patient's surgeon and Dr Sarah Gilbert and he will be  bridged back to Coumadin  Patient is advised to call me if he has questions or needed help to bridge him back to Coumadin  Follow-up as needed    Patient voiced understanding and agreement in the discussion  Counseling / Coordination of Care    Greater than 50% of total time was spent with the patient and / or family counseling and / or coordination of care

## 2021-01-19 NOTE — PATIENT INSTRUCTIONS
Last dose of Coumadin on 01/22 /21  Lovenox  on 01/ 24 / 21 and 01/25/2021  No Lovenox on 01/26/2021 and 01/27/2021  Surgery is on 01/27/2021  After that instructions will  Come from patient's surgeon and Dr Fabricio Pichardo and he will be  bridged back to Coumadin  Patient is advised to call me if he has questions or needed help to bridge him back to Coumadin    Follow-up as needed

## 2021-01-20 ENCOUNTER — ANTICOAG VISIT (OUTPATIENT)
Dept: FAMILY MEDICINE CLINIC | Facility: CLINIC | Age: 71
End: 2021-01-20

## 2021-01-20 ENCOUNTER — PATIENT OUTREACH (OUTPATIENT)
Dept: CASE MANAGEMENT | Facility: HOSPITAL | Age: 71
End: 2021-01-20

## 2021-01-20 LAB
INR PPP: 2.4 (ref 0.9–1.2)
PROTHROMBIN TIME: 24.8 SEC (ref 9.1–12)

## 2021-01-20 NOTE — PROGRESS NOTES
Message left for patient as per Dr Claudy Beckford -- can continue taking his Coumadin as he has been, and he will be holding after his 1/23/2021 dose until after his TKR  Lele Chery MD   1/20/2021  8:21 AM EST      Call patient with lab result-patient having surgery will be stopping after 1/23 dose- will wait until postop period to restart the warfarin     Patient called back -- he canceled his TKR at this time, due to wife having a terminal illness -- IPF (Idiopathic Pulmonary Fibrosis)    He will continue with same dose of Coumadin and recheck level in 2 weeks      There are INR orders on chart

## 2021-01-20 NOTE — PROGRESS NOTES
Oncology LSW received pt's completed distress thermometer from yesterday  Pt self scored a 7/10 and noted family health issues, depression, sadness, and worry as emotional concerns  Chart reviewed and supportive call placed  Pt is a pleasant 71 y/o gentleman with Factor 5 Leiden Mutation  He does not have a cancer diagnosis  Introduced self and role to pt  Mr Anamika Dutta stated he has been dealing with his wife's diagnosis of idiopathic pulmonary fibrosis for the past few years  Her condition is terminal and is now using home O2  He stated they are living each day to the best of their ability knowing her disease will eventually catch up with her  Pt was originally scheduled for a TKR, however has since cancelled because of the burden it will place on his wife  Supportive listening offered as well as counseling resources  Pt declined at this time  Discussion about attaining a COVID vaccine reviewed  Suggested pt call [phone] or PCP office to discuss further  No other SW needs identified at this time

## 2021-01-25 DIAGNOSIS — F32.A DEPRESSION, UNSPECIFIED DEPRESSION TYPE: ICD-10-CM

## 2021-01-25 DIAGNOSIS — M51.26 LUMBAR HERNIATED DISC: ICD-10-CM

## 2021-01-25 DIAGNOSIS — N40.1 BPH WITH OBSTRUCTION/LOWER URINARY TRACT SYMPTOMS: ICD-10-CM

## 2021-01-25 DIAGNOSIS — N13.8 BPH WITH OBSTRUCTION/LOWER URINARY TRACT SYMPTOMS: ICD-10-CM

## 2021-01-25 RX ORDER — CELECOXIB 200 MG/1
CAPSULE ORAL
Qty: 90 CAPSULE | Refills: 0 | Status: SHIPPED | OUTPATIENT
Start: 2021-01-25 | End: 2021-02-12 | Stop reason: SDUPTHER

## 2021-01-25 RX ORDER — DULOXETIN HYDROCHLORIDE 60 MG/1
CAPSULE, DELAYED RELEASE ORAL
Qty: 90 CAPSULE | Refills: 0 | Status: SHIPPED | OUTPATIENT
Start: 2021-01-25 | End: 2021-02-12 | Stop reason: SDUPTHER

## 2021-01-25 RX ORDER — TAMSULOSIN HYDROCHLORIDE 0.4 MG/1
CAPSULE ORAL
Qty: 90 CAPSULE | Refills: 0 | Status: SHIPPED | OUTPATIENT
Start: 2021-01-25 | End: 2021-02-12 | Stop reason: SDUPTHER

## 2021-01-31 DIAGNOSIS — M17.11 ARTHRITIS OF RIGHT KNEE: ICD-10-CM

## 2021-02-03 RX ORDER — LORATADINE 10 MG
TABLET ORAL
Qty: 30 TABLET | Refills: 0 | OUTPATIENT
Start: 2021-02-03

## 2021-02-12 DIAGNOSIS — N13.8 BPH WITH OBSTRUCTION/LOWER URINARY TRACT SYMPTOMS: ICD-10-CM

## 2021-02-12 DIAGNOSIS — F32.A DEPRESSION, UNSPECIFIED DEPRESSION TYPE: ICD-10-CM

## 2021-02-12 DIAGNOSIS — M51.26 LUMBAR HERNIATED DISC: ICD-10-CM

## 2021-02-12 DIAGNOSIS — N40.1 BPH WITH OBSTRUCTION/LOWER URINARY TRACT SYMPTOMS: ICD-10-CM

## 2021-02-12 DIAGNOSIS — G47.00 INSOMNIA, UNSPECIFIED TYPE: ICD-10-CM

## 2021-02-12 RX ORDER — CELECOXIB 200 MG/1
200 CAPSULE ORAL DAILY
Qty: 90 CAPSULE | Refills: 1 | Status: SHIPPED | OUTPATIENT
Start: 2021-02-12 | End: 2021-10-05

## 2021-02-12 RX ORDER — TAMSULOSIN HYDROCHLORIDE 0.4 MG/1
0.4 CAPSULE ORAL
Qty: 90 CAPSULE | Refills: 1 | Status: SHIPPED | OUTPATIENT
Start: 2021-02-12 | End: 2021-10-05

## 2021-02-12 RX ORDER — ZOLPIDEM TARTRATE 10 MG/1
TABLET ORAL
Qty: 90 TABLET | Refills: 0 | Status: SHIPPED | OUTPATIENT
Start: 2021-02-12 | End: 2021-05-18

## 2021-02-12 RX ORDER — DULOXETIN HYDROCHLORIDE 60 MG/1
60 CAPSULE, DELAYED RELEASE ORAL DAILY
Qty: 90 CAPSULE | Refills: 1 | Status: SHIPPED | OUTPATIENT
Start: 2021-02-12 | End: 2021-10-05

## 2021-02-15 DIAGNOSIS — Z79.01 LONG TERM CURRENT USE OF ANTICOAGULANT: ICD-10-CM

## 2021-02-15 DIAGNOSIS — Z79.01 ANTICOAGULATED ON COUMADIN: ICD-10-CM

## 2021-02-15 RX ORDER — WARFARIN SODIUM 5 MG/1
TABLET ORAL
Qty: 90 TABLET | Refills: 1 | Status: SHIPPED | OUTPATIENT
Start: 2021-02-15 | End: 2021-07-26

## 2021-02-17 ENCOUNTER — TELEPHONE (OUTPATIENT)
Dept: FAMILY MEDICINE CLINIC | Facility: CLINIC | Age: 71
End: 2021-02-17

## 2021-02-17 DIAGNOSIS — Z79.01 LONG TERM CURRENT USE OF ANTICOAGULANT: Primary | ICD-10-CM

## 2021-02-17 NOTE — TELEPHONE ENCOUNTER
Call from patient -- states he is at [facility] and they are telling him NO orders in system -- however there are 3 orders that were placed on 10/14/2020  I will put 3 more orders on the chart in Dr Gerhard Covarrubias name and 1 in Dr Cynthia Hargrove name for today

## 2021-02-18 ENCOUNTER — ANTICOAG VISIT (OUTPATIENT)
Dept: FAMILY MEDICINE CLINIC | Facility: CLINIC | Age: 71
End: 2021-02-18

## 2021-02-18 LAB
INR PPP: 1.8 (ref 0.9–1.2)
PROTHROMBIN TIME: 18.1 SEC (ref 9.1–12)

## 2021-02-18 NOTE — PROGRESS NOTES
Patient notified as per Dr Efren Aviles to doublel dose today and then continue same  Recheck in 2 weeks

## 2021-02-24 ENCOUNTER — TELEPHONE (OUTPATIENT)
Dept: NEUROLOGY | Facility: CLINIC | Age: 71
End: 2021-02-24

## 2021-02-24 NOTE — TELEPHONE ENCOUNTER
Called pt to confirm upcoming apt in 2 weeks on 3/10/21 with Dr Shiela Francois     Asked pt if any new/worsening symptoms to report? Nothing to report at this time    Asked pt if they are unable to keep apt or interested in virtual apt to please call office, also advised of no show fee  Advised we will call closer to day of apt for COVID screening

## 2021-03-02 DIAGNOSIS — M17.11 ARTHRITIS OF RIGHT KNEE: ICD-10-CM

## 2021-03-02 RX ORDER — FERROUS SULFATE TAB EC 324 MG (65 MG FE EQUIVALENT) 324 (65 FE) MG
TABLET DELAYED RESPONSE ORAL
Qty: 60 TABLET | Refills: 0 | OUTPATIENT
Start: 2021-03-02

## 2021-03-03 DIAGNOSIS — Z23 ENCOUNTER FOR IMMUNIZATION: ICD-10-CM

## 2021-03-03 LAB
INR PPP: 1.7 (ref 0.9–1.2)
PROTHROMBIN TIME: 17.6 SEC (ref 9.1–12)

## 2021-03-04 ENCOUNTER — ANTICOAG VISIT (OUTPATIENT)
Dept: FAMILY MEDICINE CLINIC | Facility: CLINIC | Age: 71
End: 2021-03-04

## 2021-03-04 DIAGNOSIS — D68.51 FACTOR V LEIDEN MUTATION (HCC): ICD-10-CM

## 2021-03-04 DIAGNOSIS — Z79.01 LONG TERM CURRENT USE OF ANTICOAGULANT: Primary | ICD-10-CM

## 2021-03-04 NOTE — PROGRESS NOTES
Delonte Bailey MD  P [facility] Clinical             Same---      Reviewed with Dr Lissy Sierra -- 2 weeks ago INR was 1 8 and he doubled dose for 1 day and then continue same --now 1 7 -- verbal order from Dr Lissy Sierra OK to double dose again today      New INR orders placed

## 2021-03-10 ENCOUNTER — CONSULT (OUTPATIENT)
Dept: NEUROLOGY | Facility: CLINIC | Age: 71
End: 2021-03-10
Payer: COMMERCIAL

## 2021-03-10 VITALS
BODY MASS INDEX: 36.62 KG/M2 | DIASTOLIC BLOOD PRESSURE: 74 MMHG | HEART RATE: 86 BPM | SYSTOLIC BLOOD PRESSURE: 126 MMHG | WEIGHT: 270 LBS

## 2021-03-10 DIAGNOSIS — R41.3 MEMORY DYSFUNCTION: ICD-10-CM

## 2021-03-10 DIAGNOSIS — I48.0 PAROXYSMAL ATRIAL FIBRILLATION (HCC): Primary | ICD-10-CM

## 2021-03-10 PROCEDURE — 99204 OFFICE O/P NEW MOD 45 MIN: CPT | Performed by: PSYCHIATRY & NEUROLOGY

## 2021-03-10 PROCEDURE — 3078F DIAST BP <80 MM HG: CPT | Performed by: PSYCHIATRY & NEUROLOGY

## 2021-03-10 PROCEDURE — 3074F SYST BP LT 130 MM HG: CPT | Performed by: PSYCHIATRY & NEUROLOGY

## 2021-03-10 PROCEDURE — 1036F TOBACCO NON-USER: CPT | Performed by: PSYCHIATRY & NEUROLOGY

## 2021-03-10 NOTE — PROGRESS NOTES
Patient ID: Iva Suarez is a 80 y o  male  Assessment/Plan:    Paroxysmal atrial fibrillation (HCC)  Pt follows with cardiology  Pt on coumadin      Memory difficulty  Pt seen today for initial memory eval  Pt wife noted some change in mood and demeanor   Pt does not particularly notice any issue  Family/ sons not say anything to pt   Pt notes no significant changes in adls  Pt driving, doing all adls  Wife has always done bills  Shared grocery shopping  No issues with judgement  Pt with 26/30 on moca  Pt also with superimposed hearing issue  Pt seeing ent as well  rec audiology as well  Pt to have mri head and metabolic labs to work up memory issues  Pt to rtn after studies  Call for any new issues       Diagnoses and all orders for this visit:    Paroxysmal atrial fibrillation ([address] )    Memory dysfunction  -     Ambulatory referral to Neurology  -     MRI brain memory wo contrast; Future  -     Vitamin B12; Future  -     Vitamin B6; Future  -     Vitamin B1, whole blood; Future  -     TSH, 3rd generation with Free T4 reflex; Future  -     Folate; Future  -     Vitamin B12  -     Vitamin B6  -     Vitamin B1, whole blood  -     TSH, 3rd generation with Free T4 reflex  -     Folate           Subjective:    HPI    Pt is a 81 yo m with pmh of PAF on coumadin, WILL on CPAP , factor V Leyden mutation, hypercholesterolemia, h/o dvt who presents today for evaluation of memory issues  Pts wife mentionned to pcp concerns for memory  Pt notes wife has noted some changes in his mood, more irritable and mean at times  Pt does not particularly feel he has had any change in temperament  Pt notes wife now with significant pulmonary issues , on oxygen and some issues with more arguments lately in accomodating these health issues  Pt notes wife has always done finances and cont to do so  Pt notes no issues with judgement, driving or adls  Pt is doing more around the home and groceries since wife illness    Pt notes he tries to be more mindful of pt condition and frequently cancels activities he would typically do to stay home with wife  Pt notes some more arguments lately with spouse  No specific new neuro sxs  Pt denies any new sxs  No falls or trips  No change in bowel or bladder  No LOC, no seizure  No change in speech or swallowing  No change in vision  No loss of vision  No diplopia  No loss of vision  No headache, no nausea or vomiting  No recent hospitalizations  No recent infections  No close covid contacts  Pt is very close with his 4 sons  Pt notes very close family  Pt feels he has been more jolly and optimistic in past   He notes he is less of a worrier than his wife  Pt feels he is about the same temperament just more stressed around wife in current health situation  Pt has not had any updated imaging  rec imaging as well as memory labs  Pt has also been following closely with ortho lately due to knee pain and presently under consideration for knee replacement  Pt has put this on hold as he is worried about being laid up especially for his wife  Pt just had first covid vaccination at [facility]  Pt follows closely with cardiology due to afib history  No sob, cp or palpitations  Pt denies any history of head trauma or sz  No history of concussions  The following portions of the patient's history were reviewed and updated as appropriate: allergies, current medications, past family history, past medical history, past social history, past surgical history and problem list and med rec and ros rev  Objective:    Blood pressure 126/74, pulse 86, weight 122 kg (270 lb)  Physical Exam  Constitutional:       General: He is not in acute distress  Appearance: He is not ill-appearing  Eyes:      General: Lids are normal       Extraocular Movements: Extraocular movements intact  Pupils: Pupils are equal, round, and reactive to light     Musculoskeletal:      Right lower leg: No edema  Left lower leg: No edema  Neurological:      Mental Status: He is alert  Coordination: Coordination is intact  Deep Tendon Reflexes: Strength normal and reflexes are normal and symmetric  Psychiatric:         Speech: Speech normal          Neurological Exam  Mental Status  Alert  Able to copy figure  Clock drawing is normal  Speech is normal  Language is fluent with no aphasia  Unable to perform serial calculations  Able to spell words backwards  Fund of knowledge is appropriate for level of education  26/ 30 for moca  Pt with diff with visuospatial trail  Good naming  4/5 delay recall  5/6 orientation, wrong date  Cranial Nerves  CN II: Visual acuity is normal  Visual fields full to confrontation  Right funduscopic exam: disc intact  Left funduscopic exam: disc intact  CN III, IV, VI: Extraocular movements intact bilaterally  Normal lids and orbits bilaterally  Pupils equal round and reactive to light bilaterally  CN V: Facial sensation is normal   CN VII: Full and symmetric facial movement  CN VIII: Hearing is normal   CN IX, X: Palate elevates symmetrically  Normal gag reflex  CN XI: Shoulder shrug strength is normal   CN XII: Tongue midline without atrophy or fasciculations  Motor  Normal muscle bulk throughout  Normal muscle tone  No abnormal involuntary movements  Strength is 5/5 throughout all four extremities  Sensory  Sensation is intact to light touch, pinprick, vibration and proprioception in all four extremities  Reflexes  Deep tendon reflexes are 2+ and symmetric in all four extremities with downgoing toes bilaterally  Coordination  Finger-to-nose, rapid alternating movements and heel-to-shin normal bilaterally without dysmetria  Gait  Normal casual, toe, heel and tandem gait  ROS:    Review of Systems   Constitutional: Negative  Negative for appetite change and fever  HENT: Negative    Negative for hearing loss, tinnitus, trouble swallowing and voice change  Eyes: Negative  Negative for photophobia and pain  Respiratory: Negative  Negative for shortness of breath  Cardiovascular: Negative  Negative for palpitations  Gastrointestinal: Negative  Negative for nausea and vomiting  Endocrine: Negative  Negative for cold intolerance  Genitourinary: Negative  Negative for dysuria, frequency and urgency  Musculoskeletal: Negative  Negative for myalgias and neck pain  Skin: Negative  Negative for rash  Neurological: Negative  Negative for dizziness, tremors, seizures, syncope, facial asymmetry, speech difficulty, weakness, light-headedness, numbness and headaches  Memory problems   Hematological: Negative  Does not bruise/bleed easily  Psychiatric/Behavioral: Negative  Negative for confusion, hallucinations and sleep disturbance

## 2021-03-10 NOTE — ASSESSMENT & PLAN NOTE
Lens Material: Pt seen today for initial memory eval  Pt wife noted some change in mood and demeanor   Pt does not particularly notice any issue  Family/ sons not say anything to pt   Pt notes no significant changes in adls  Pt driving, doing all adls  Wife has always done bills  Shared grocery shopping  No issues with judgement  Pt with 26/30 on moca  Pt also with superimposed hearing issue  Pt seeing ent as well  rec audiology as well  Pt to have mri head and metabolic labs to work up memory issues  Pt to rtn after studies  Call for any new issues

## 2021-03-17 LAB
INR PPP: 2 (ref 0.9–1.2)
PROTHROMBIN TIME: 20.3 SEC (ref 9.1–12)

## 2021-03-18 ENCOUNTER — ANTICOAG VISIT (OUTPATIENT)
Dept: FAMILY MEDICINE CLINIC | Facility: CLINIC | Age: 71
End: 2021-03-18

## 2021-03-18 DIAGNOSIS — Z79.01 LONG TERM CURRENT USE OF ANTICOAGULANT: ICD-10-CM

## 2021-03-18 DIAGNOSIS — D68.51 FACTOR V LEIDEN MUTATION (HCC): Primary | ICD-10-CM

## 2021-03-18 NOTE — PROGRESS NOTES
MD Chalino Ochoa MA             Call patient with lab result-   same      2 additional  INR orders placed

## 2021-03-22 LAB
FOLATE SERPL-MCNC: >20 NG/ML
TSH SERPL DL<=0.005 MIU/L-ACNC: 1.6 UIU/ML (ref 0.45–4.5)
VIT B1 BLD-SCNC: 112.7 NMOL/L (ref 66.5–200)
VIT B12 SERPL-MCNC: 347 PG/ML (ref 232–1245)
VIT B6 SERPL-MCNC: 19.4 UG/L (ref 5.3–46.7)

## 2021-03-27 ENCOUNTER — HOSPITAL ENCOUNTER (OUTPATIENT)
Dept: MRI IMAGING | Facility: HOSPITAL | Age: 71
Discharge: HOME | End: 2021-03-27
Attending: PSYCHIATRY & NEUROLOGY
Payer: COMMERCIAL

## 2021-03-27 DIAGNOSIS — R41.3 MEMORY DYSFUNCTION: ICD-10-CM

## 2021-03-27 PROCEDURE — G1004 CDSM NDSC: HCPCS

## 2021-03-27 PROCEDURE — 70551 MRI BRAIN STEM W/O DYE: CPT

## 2021-03-31 ENCOUNTER — ANTICOAG VISIT (OUTPATIENT)
Dept: FAMILY MEDICINE CLINIC | Facility: CLINIC | Age: 71
End: 2021-03-31

## 2021-03-31 LAB
INR PPP: 2.8 (ref 0.9–1.2)
PROTHROMBIN TIME: 27.9 SEC (ref 9.1–12)

## 2021-04-06 ENCOUNTER — TELEPHONE (OUTPATIENT)
Dept: NEUROLOGY | Facility: CLINIC | Age: 71
End: 2021-04-06

## 2021-04-06 NOTE — TELEPHONE ENCOUNTER
Pt and wife made aware, they verbalizes understanding  They report they have an ENT they would like to go to  MRI results routed to PCP

## 2021-04-06 NOTE — TELEPHONE ENCOUNTER
----- Message from Marci Cai MD sent at 4/4/2021  7:43 AM EDT -----  Let pt know mri head with some cerebral atrophy and mild white matter changes  Will review at appt  Pt also with peter matroid effusions  Send copy of mri to pcp    Pt to discuss need for ent with pcp

## 2021-04-06 NOTE — TELEPHONE ENCOUNTER
I got the report of MRI from neuro  Psoas some fluids in the mastoid bones behind the ears  Not sure exactly what this means  Does he have any pain or other symptoms related into the ears? We can have him see [facility] Nose and Thro[facility] to see if they would recommend doing anything about this

## 2021-04-13 LAB
INR PPP: 2.6 (ref 0.9–1.2)
PROTHROMBIN TIME: 26.2 SEC (ref 9.1–12)

## 2021-04-15 ENCOUNTER — ANTICOAG VISIT (OUTPATIENT)
Dept: FAMILY MEDICINE CLINIC | Facility: CLINIC | Age: 71
End: 2021-04-15

## 2021-04-19 NOTE — PROGRESS NOTES
PT AWARE TO DOUBLE DOSE TODAY AND THEN TO CONTINUE 2 5 MG DAILY, INCLUDING WEDNESDAYS AND RECHECK 2 WEEKS  Patient has had two episodes of blood in his urine today. Advise.

## 2021-04-20 DIAGNOSIS — I10 ESSENTIAL HYPERTENSION: ICD-10-CM

## 2021-04-20 DIAGNOSIS — I48.91 ATRIAL FIBRILLATION, UNSPECIFIED TYPE (HCC): ICD-10-CM

## 2021-04-20 DIAGNOSIS — E78.5 DYSLIPIDEMIA: ICD-10-CM

## 2021-04-21 RX ORDER — METOPROLOL SUCCINATE 100 MG/1
TABLET, EXTENDED RELEASE ORAL
Qty: 90 TABLET | Refills: 0 | Status: SHIPPED | OUTPATIENT
Start: 2021-05-10 | End: 2021-07-26

## 2021-04-21 RX ORDER — PRAVASTATIN SODIUM 40 MG
TABLET ORAL
Qty: 90 TABLET | Refills: 1 | Status: SHIPPED | OUTPATIENT
Start: 2021-04-21 | End: 2021-10-05

## 2021-04-21 RX ORDER — FLECAINIDE ACETATE 100 MG/1
TABLET ORAL
Qty: 180 TABLET | Refills: 1 | Status: SHIPPED | OUTPATIENT
Start: 2021-04-21 | End: 2021-10-05

## 2021-04-28 ENCOUNTER — ANTICOAG VISIT (OUTPATIENT)
Dept: FAMILY MEDICINE CLINIC | Facility: CLINIC | Age: 71
End: 2021-04-28

## 2021-04-28 LAB
INR PPP: 2.4 (ref 0.9–1.2)
PROTHROMBIN TIME: 24.5 SEC (ref 9.1–12)

## 2021-05-12 ENCOUNTER — ANTICOAG VISIT (OUTPATIENT)
Dept: FAMILY MEDICINE CLINIC | Facility: CLINIC | Age: 71
End: 2021-05-12

## 2021-05-12 DIAGNOSIS — D68.51 FACTOR V LEIDEN MUTATION (HCC): ICD-10-CM

## 2021-05-12 DIAGNOSIS — Z79.01 LONG TERM CURRENT USE OF ANTICOAGULANT: Primary | ICD-10-CM

## 2021-05-12 LAB
INR PPP: 2.4 (ref 0.9–1.2)
PROTHROMBIN TIME: 24 SEC (ref 9.1–12)

## 2021-05-12 NOTE — PROGRESS NOTES
Odell Cisneros MD   5/12/2021  8:05 AM EDT      Call patient with lab result-same     3 new INR orders placed

## 2021-05-17 DIAGNOSIS — Z79.01 LONG TERM CURRENT USE OF ANTICOAGULANT: ICD-10-CM

## 2021-05-17 DIAGNOSIS — F32.A DEPRESSION, UNSPECIFIED DEPRESSION TYPE: ICD-10-CM

## 2021-05-17 DIAGNOSIS — I48.91 ATRIAL FIBRILLATION, UNSPECIFIED TYPE (HCC): ICD-10-CM

## 2021-05-17 DIAGNOSIS — E78.5 DYSLIPIDEMIA: ICD-10-CM

## 2021-05-17 DIAGNOSIS — M51.26 LUMBAR HERNIATED DISC: ICD-10-CM

## 2021-05-17 DIAGNOSIS — N13.8 BPH WITH OBSTRUCTION/LOWER URINARY TRACT SYMPTOMS: ICD-10-CM

## 2021-05-17 DIAGNOSIS — I10 ESSENTIAL HYPERTENSION: ICD-10-CM

## 2021-05-17 DIAGNOSIS — N40.1 BPH WITH OBSTRUCTION/LOWER URINARY TRACT SYMPTOMS: ICD-10-CM

## 2021-05-17 DIAGNOSIS — Z79.01 ANTICOAGULATED ON COUMADIN: ICD-10-CM

## 2021-05-17 DIAGNOSIS — G47.00 INSOMNIA, UNSPECIFIED TYPE: ICD-10-CM

## 2021-05-17 RX ORDER — WARFARIN SODIUM 5 MG/1
TABLET ORAL
Qty: 90 TABLET | Refills: 1 | Status: CANCELLED | OUTPATIENT
Start: 2021-05-17

## 2021-05-17 RX ORDER — TAMSULOSIN HYDROCHLORIDE 0.4 MG/1
0.4 CAPSULE ORAL
Qty: 90 CAPSULE | Refills: 1 | Status: CANCELLED | OUTPATIENT
Start: 2021-05-17

## 2021-05-17 RX ORDER — METOPROLOL SUCCINATE 100 MG/1
100 TABLET, EXTENDED RELEASE ORAL DAILY
Qty: 90 TABLET | Refills: 0 | Status: CANCELLED | OUTPATIENT
Start: 2021-05-17

## 2021-05-17 RX ORDER — PRAVASTATIN SODIUM 40 MG
40 TABLET ORAL DAILY
Qty: 90 TABLET | Refills: 1 | Status: CANCELLED | OUTPATIENT
Start: 2021-05-17

## 2021-05-17 RX ORDER — CELECOXIB 200 MG/1
200 CAPSULE ORAL DAILY
Qty: 90 CAPSULE | Refills: 1 | Status: CANCELLED | OUTPATIENT
Start: 2021-05-17

## 2021-05-17 RX ORDER — DULOXETIN HYDROCHLORIDE 60 MG/1
60 CAPSULE, DELAYED RELEASE ORAL DAILY
Qty: 90 CAPSULE | Refills: 1 | Status: CANCELLED | OUTPATIENT
Start: 2021-05-17

## 2021-05-17 RX ORDER — FLECAINIDE ACETATE 100 MG/1
100 TABLET ORAL 2 TIMES DAILY
Qty: 180 TABLET | Refills: 1 | Status: CANCELLED | OUTPATIENT
Start: 2021-05-17

## 2021-05-17 NOTE — TELEPHONE ENCOUNTER
Call [facility] and per pharmacist  patient still has refill    On medication    warfarin   Pravastatin  Celebrex  metoprolol  Cymbalta  Tamsulosin  Flecainide    voicemail left for patient to call office , also need OV

## 2021-05-18 RX ORDER — ZOLPIDEM TARTRATE 10 MG/1
TABLET ORAL
Qty: 90 TABLET | Refills: 1 | Status: SHIPPED | OUTPATIENT
Start: 2021-05-18 | End: 2021-10-11 | Stop reason: SDUPTHER

## 2021-05-19 ENCOUNTER — OFFICE VISIT (OUTPATIENT)
Dept: OBGYN CLINIC | Facility: CLINIC | Age: 71
End: 2021-05-19
Payer: COMMERCIAL

## 2021-05-19 VITALS
HEART RATE: 81 BPM | SYSTOLIC BLOOD PRESSURE: 124 MMHG | HEIGHT: 72 IN | DIASTOLIC BLOOD PRESSURE: 82 MMHG | WEIGHT: 266 LBS | BODY MASS INDEX: 36.03 KG/M2

## 2021-05-19 DIAGNOSIS — M17.12 ARTHRITIS OF LEFT KNEE: ICD-10-CM

## 2021-05-19 DIAGNOSIS — M17.11 ARTHRITIS OF RIGHT KNEE: Primary | ICD-10-CM

## 2021-05-19 PROCEDURE — 20610 DRAIN/INJ JOINT/BURSA W/O US: CPT | Performed by: ORTHOPAEDIC SURGERY

## 2021-05-19 RX ORDER — BUPIVACAINE HYDROCHLORIDE 2.5 MG/ML
4 INJECTION, SOLUTION INFILTRATION; PERINEURAL
Status: COMPLETED | OUTPATIENT
Start: 2021-05-19 | End: 2021-05-19

## 2021-05-19 RX ORDER — METHYLPREDNISOLONE ACETATE 40 MG/ML
1 INJECTION, SUSPENSION INTRA-ARTICULAR; INTRALESIONAL; INTRAMUSCULAR; SOFT TISSUE
Status: COMPLETED | OUTPATIENT
Start: 2021-05-19 | End: 2021-05-19

## 2021-05-19 RX ADMIN — BUPIVACAINE HYDROCHLORIDE 4 ML: 2.5 INJECTION, SOLUTION INFILTRATION; PERINEURAL at 13:27

## 2021-05-19 RX ADMIN — METHYLPREDNISOLONE ACETATE 1 ML: 40 INJECTION, SUSPENSION INTRA-ARTICULAR; INTRALESIONAL; INTRAMUSCULAR; SOFT TISSUE at 13:27

## 2021-05-19 NOTE — PROGRESS NOTES
Large joint arthrocentesis: bilateral knee  Universal Protocol:  Consent given by: patient  Time out: Immediately prior to procedure a "time out" was called to verify the correct patient, procedure, equipment, support staff and site/side marked as required  Site marked: the operative site was marked  Supporting Documentation  Indications: pain   Procedure Details  Location: knee - bilateral knee  Needle size: 20 G  Approach: anterior    Medications (Right): 4 mL bupivacaine 0 25 %; 1 mL methylPREDNISolone acetate 40 mg/mLMedications (Left): 4 mL bupivacaine 0 25 %; 1 mL methylPREDNISolone acetate 40 mg/mL         Bala Shirley MD  Adult Reconstruction  Department [address]  05/19/21  1:27 PM

## 2021-05-25 LAB
INR PPP: 2.5 (ref 0.9–1.2)
PROTHROMBIN TIME: 25.7 SEC (ref 9.1–12)

## 2021-05-26 ENCOUNTER — ANTICOAG VISIT (OUTPATIENT)
Dept: FAMILY MEDICINE CLINIC | Facility: CLINIC | Age: 71
End: 2021-05-26

## 2021-05-28 ENCOUNTER — OFFICE VISIT (OUTPATIENT)
Dept: NEUROLOGY | Facility: CLINIC | Age: 71
End: 2021-05-28
Payer: COMMERCIAL

## 2021-05-28 VITALS
HEART RATE: 59 BPM | SYSTOLIC BLOOD PRESSURE: 126 MMHG | DIASTOLIC BLOOD PRESSURE: 64 MMHG | BODY MASS INDEX: 34.15 KG/M2 | WEIGHT: 251.8 LBS

## 2021-05-28 DIAGNOSIS — R41.3 MEMORY DIFFICULTY: Primary | ICD-10-CM

## 2021-05-28 DIAGNOSIS — Z86.718 HISTORY OF DVT OF LOWER EXTREMITY: ICD-10-CM

## 2021-05-28 DIAGNOSIS — D68.51 FACTOR V LEIDEN MUTATION (HCC): ICD-10-CM

## 2021-05-28 PROCEDURE — 99214 OFFICE O/P EST MOD 30 MIN: CPT | Performed by: PSYCHIATRY & NEUROLOGY

## 2021-05-28 NOTE — ASSESSMENT & PLAN NOTE
Pt overall doing about the same since last visit  Able to review examples with wife at appt today  Rev in detail mri findings from 3/27  Pt with cerebral atrophy and small vessel changes  Rev pathophysiology of both  Pt also with incidental mastoid effusions peter  Pt already in process of follow up with ent for hearing and now the effusions  Audiology and ent eval would be very helpful from memory and comprehension yuri auditory processing  No issues with judgement, hygiene, finances  Overall feeling about the same  Rev labs, rec vit b12 supplement  Pt to call for any new sxs  Also offered formal neurocognitive eval  Pt to do ent eval and also work with spouse of more communication together as timed check ins due to her health issues and plans for the day

## 2021-05-28 NOTE — PROGRESS NOTES
Patient ID: Alexy Miranda is a 80 y o  male  Assessment/Plan:    Memory difficulty  Pt overall doing about the same since last visit  Able to review examples with wife at appt today  Rev in detail mri findings from 3/27  Pt with cerebral atrophy and small vessel changes  Rev pathophysiology of both  Pt also with incidental mastoid effusions peter  Pt already in process of follow up with ent for hearing and now the effusions  Audiology and ent eval would be very helpful from memory and comprehension yuri auditory processing  No issues with judgement, hygiene, finances  Overall feeling about the same  Rev labs, rec vit b12 supplement  Pt to call for any new sxs  Also offered formal neurocognitive eval  Pt to do ent eval and also work with spouse of more communication together as timed check ins due to her health issues and plans for the day      History of DVT of lower extremity  Pt on coumadin    Factor V Leiden mutation ([facility] [address] )  Pt on coumadin       Diagnoses and all orders for this visit:    Memory difficulty    History of DVT of lower extremity    Factor V Leiden mutation ([facility] [address] )           Subjective:    HPI    Pt is a 81 yo m with pmh of PAF on coumadin, WILL on CPAP , factor V Leyden mutation, hypercholesterolemia, h/o dvt who presents today for evaluation of memory issues  Pt initially seen on 3/10/21  Per my last note " Pts wife mentionned to pcp concerns for memory  Pt notes wife has noted some changes in his mood, more irritable and mean at times  Pt does not particularly feel he has had any change in temperament  Pt notes wife now with significant pulmonary issues , on oxygen and some issues with more arguments lately in accomodating these health issues  Pt notes wife has always done finances and cont to do so  Pt notes no issues with judgement, driving or adls  Pt is doing more around the home and groceries since wife illness    Pt notes he tries to be more mindful of pt condition and frequently cancels activities he would typically do to stay home with wife  Pt notes some more arguments lately with spouse  No specific new neuro sxs "  Pt here today for neuro follow up  Spouse also present and very helpful for details of concerns  Pt spouse with own medical issues and increased need for communiation at this time  Pt wife notes issues not specificc memory related but more comprehension  Pt wife noted example of herself being outside too long and felt maybe like heat stroke but her spouse did not realize she was that ill and that was upsetting  Spouse also noted friend recently visited who had family member pass and she notes the patient did not greet or give condolences  Some of the issues are specific to communication  No clear judgement, hygiene or safety issues  Pt also with increased issues with hearing loss  Pt is in process of seeing ent and audiology which may be very helpful for retaining and registering of information  Pt notes he is not as social as his wife in first place  Pt notes this has been long standing  Pt without any acute changes in behavior  Both pt and spouse adapting to new medical condition for spouse as well as trying to be mindful of each other needs and concerns  Pt notes some difficulty with his own planning of day dependent upon needs of his wife  Pt notes he really does try to be attentive  No specific neuro focal issues  Pt denies any new sxs  No falls or trips  No change in bowel or bladder  No LOC, no seizure  No change in speech or swallowing  No change in vision  No loss of vision  No diplopia  No loss of vision  No headache, no nausea or vomiting  No recent hospitalizations  No recent infections  Pt had mri head done on 3/27/21  Rev study in detail with pt and spouse  Cerebral atrophy and mild chronic microangiopathy  Consider volumetric brain MRI ([facility]) if mesial temporal lobe focused neurodegeneration is concerned  2    Mild bilateral mastoid effusion  Pt already in process of seeing ent and will also address the incidental mastoid effusion  Also reviewed pathophysiology of cerebral atrophy as well as small vessel changes with pt  Pt is already on coumadin for history of factor v leyden and h/o dvts  Pt also had PAF but notes he is no longer in afib since ablation procedures  Pt also had labs on 3/17/21 folate nl, tsh nl, vit b1 nl, vit b6 nl   Vit b12 at 347  Pt to increase diet with vit b12 foods  Pt currently not interested in full neurocogntive testing at this time  Pt and spouse going to work on more timed communication in the home setting as well as help each other with expectations  Pt is also going to see ent and audiology and see if hearing supplementation may be helpful as well  If any worsening of sxs, pt to call and we will do neurocogntive panel as well  Pt feels he is about the same temperament just more stressed around wife in current health situation  Pt follows closely with cardiology due to afib history  currently no palpitations, sob or cp  The following portions of the patient's history were reviewed and updated as appropriate: allergies, current medications, past family history, past medical history, past social history, past surgical history and problem list and med rec and ros rev  Objective:    Blood pressure 126/64, pulse 59, weight 114 kg (251 lb 12 8 oz)  Physical Exam  Constitutional:       General: He is not in acute distress  Appearance: He is not ill-appearing  Eyes:      General: Lids are normal       Extraocular Movements: Extraocular movements intact  Pupils: Pupils are equal, round, and reactive to light  Musculoskeletal:      Right lower leg: No edema  Left lower leg: No edema  Neurological:      Mental Status: He is alert        Deep Tendon Reflexes: Strength normal    Psychiatric:         Speech: Speech normal          Neurological Exam  Mental Status  Alert  Speech is normal  Language is fluent with no aphasia  Cranial Nerves  CN II: Visual acuity is normal  Visual fields full to confrontation  CN III, IV, VI: Extraocular movements intact bilaterally  Normal lids and orbits bilaterally  Pupils equal round and reactive to light bilaterally  CN V: Facial sensation is normal   CN VII: Full and symmetric facial movement  CN VIII: Hearing is normal   CN IX, X: Palate elevates symmetrically  Normal gag reflex  CN XI: Shoulder shrug strength is normal   CN XII: Tongue midline without atrophy or fasciculations  Motor  Normal muscle bulk throughout  Normal muscle tone  Strength is 5/5 throughout all four extremities  Coordination  Right: Finger-to-nose normal  Rapid alternating movement normal   Left: Finger-to-nose normal  Rapid alternating movement normal     Gait  Casual gait is normal including stance, stride, and arm swing  ROS:    Review of Systems   Constitutional: Negative  Negative for appetite change and fever  HENT: Positive for hearing loss  Negative for tinnitus, trouble swallowing and voice change  Eyes: Negative  Negative for photophobia and pain  Respiratory: Negative  Negative for shortness of breath  Cardiovascular: Negative  Negative for palpitations  Gastrointestinal: Negative  Negative for nausea and vomiting  Endocrine: Negative  Negative for cold intolerance  Genitourinary: Negative  Negative for dysuria, frequency and urgency  Musculoskeletal: Negative  Negative for myalgias and neck pain  Skin: Negative  Negative for rash  Neurological: Negative  Negative for dizziness, tremors, seizures, syncope, facial asymmetry, speech difficulty, weakness, light-headedness, numbness and headaches  Hematological: Negative  Does not bruise/bleed easily  Psychiatric/Behavioral: Negative  Negative for confusion, hallucinations and sleep disturbance          Memory problems  Difficulty understanding or processing things

## 2021-06-14 ENCOUNTER — OFFICE VISIT (OUTPATIENT)
Dept: FAMILY MEDICINE CLINIC | Facility: CLINIC | Age: 71
End: 2021-06-14
Payer: COMMERCIAL

## 2021-06-14 VITALS
BODY MASS INDEX: 34.27 KG/M2 | TEMPERATURE: 98 F | HEIGHT: 72 IN | DIASTOLIC BLOOD PRESSURE: 66 MMHG | SYSTOLIC BLOOD PRESSURE: 126 MMHG | OXYGEN SATURATION: 96 % | HEART RATE: 59 BPM | WEIGHT: 253 LBS

## 2021-06-14 DIAGNOSIS — I48.0 PAROXYSMAL ATRIAL FIBRILLATION (HCC): ICD-10-CM

## 2021-06-14 DIAGNOSIS — F32.1 DEPRESSION, MAJOR, SINGLE EPISODE, MODERATE (HCC): ICD-10-CM

## 2021-06-14 DIAGNOSIS — E78.2 MIXED HYPERLIPIDEMIA: ICD-10-CM

## 2021-06-14 DIAGNOSIS — R73.01 IFG (IMPAIRED FASTING GLUCOSE): ICD-10-CM

## 2021-06-14 DIAGNOSIS — Z98.890 STATUS POST CATHETER ABLATION OF ATRIAL FLUTTER: ICD-10-CM

## 2021-06-14 DIAGNOSIS — D68.51 FACTOR V LEIDEN MUTATION (HCC): ICD-10-CM

## 2021-06-14 DIAGNOSIS — I10 ESSENTIAL HYPERTENSION: Primary | ICD-10-CM

## 2021-06-14 DIAGNOSIS — G47.33 OSA (OBSTRUCTIVE SLEEP APNEA): ICD-10-CM

## 2021-06-14 PROCEDURE — 99214 OFFICE O/P EST MOD 30 MIN: CPT | Performed by: FAMILY MEDICINE

## 2021-06-14 PROCEDURE — 1036F TOBACCO NON-USER: CPT | Performed by: FAMILY MEDICINE

## 2021-06-14 PROCEDURE — 3008F BODY MASS INDEX DOCD: CPT | Performed by: FAMILY MEDICINE

## 2021-06-14 PROCEDURE — 1160F RVW MEDS BY RX/DR IN RCRD: CPT | Performed by: FAMILY MEDICINE

## 2021-06-14 NOTE — PROGRESS NOTES
[id number]         NAME: Pilar Mcfadden is a 80 y o  male  : 1950    MRN: [id number]  DATE: 2021  TIME: 5:10 PM    Assessment and Plan   Essential hypertension [I10]  1  Essential hypertension  Comprehensive metabolic panel    Comprehensive metabolic panel   2  Factor V Leiden mutation ([facility] )     3  Paroxysmal atrial fibrillation (HCC)     4  Status post catheter ablation of atrial flutter     5  Depression, major, single episode, moderate ([facility] )     6  Mixed hyperlipidemia  Comprehensive metabolic panel    Comprehensive metabolic panel   7  WILL (obstructive sleep apnea)     8  BMI 34 0-34 9,adult     9  IFG (impaired fasting glucose)  Hemoglobin A1C    Hemoglobin A1C       No problem-specific Assessment & Plan notes found for this encounter  Patient Instructions     Patient Instructions     Medical management-continue current medications  Get extra labs with next PT INR  Follow-up with Cardiology  Continue work on diet, exercise as able, and weight loss  Recheck 5-6 months for Medicare wellness after 2021  Weight Management   AMBULATORY CARE:   Why it is important to manage your weight:  Being overweight increases your risk of health conditions such as heart disease, high blood pressure, type 2 diabetes, and certain types of cancer  It can also increase your risk for osteoarthritis, sleep apnea, and other respiratory problems  Aim for a slow, steady weight loss  Even a small amount of weight loss can lower your risk of health problems  How to lose weight safely:  A safe and healthy way to lose weight is to eat fewer calories and get regular exercise  · You can lose up about 1 pound a week by decreasing the number of calories you eat by 500 calories each day  You can decrease calories by eating smaller portion sizes or by cutting out high-calorie foods  Read labels to find out how many calories are in the foods you eat           · You can also burn calories with exercise such as walking, swimming, or biking  You will be more likely to keep weight off if you make these changes part of your lifestyle  Exercise at least 30 minutes per day on most days of the week  You can also fit in more physical activity by taking the stairs instead of the elevator or parking farther away from stores  Ask your healthcare provider about the best exercise plan for you  Healthy meal plan for weight management:  A healthy meal plan includes a variety of foods, contains fewer calories, and helps you stay healthy  A healthy meal plan includes the following:     · Eat whole-grain foods more often  A healthy meal plan should contain fiber  Fiber is the part of grains, fruits, and vegetables that is not broken down by your body  Whole-grain foods are healthy and provide extra fiber in your diet  Some examples of whole-grain foods are whole-wheat breads and pastas, oatmeal, brown rice, and bulgur  · Eat a variety of vegetables every day  Include dark, leafy greens such as spinach, kale, jens greens, and mustard greens  Eat yellow and orange vegetables such as carrots, sweet potatoes, and winter squash  · Eat a variety of fruits every day  Choose fresh or canned fruit (canned in its own juice or light syrup) instead of juice  Fruit juice has very little or no fiber  · Eat low-fat dairy foods  Drink fat-free (skim) milk or 1% milk  Eat fat-free yogurt and low-fat cottage cheese  Try low-fat cheeses such as mozzarella and other reduced-fat cheeses  · Choose meat and other protein foods that are low in fat  Choose beans or other legumes such as split peas or lentils  Choose fish, skinless poultry (chicken or turkey), or lean cuts of red meat (beef or pork)  Before you cook meat or poultry, cut off any visible fat  · Use less fat and oil  Try baking foods instead of frying them   Add less fat, such as margarine, sour cream, regular salad dressing and mayonnaise to foods  Eat fewer high-fat foods  Some examples of high-fat foods include french fries, doughnuts, ice cream, and cakes  · Eat fewer sweets  Limit foods and drinks that are high in sugar  This includes candy, cookies, regular soda, and sweetened drinks  Ways to decrease calories:   · Eat smaller portions  ? Use a small plate with smaller servings  ? Do not eat second helpings  ? When you eat at a restaurant, ask for a box and place half of your meal in the box before you eat  ? Share an entrée with someone else  · Replace high-calorie snacks with healthy, low-calorie snacks  ? Choose fresh fruit, vegetables, fat-free rice cakes, or air-popped popcorn instead of potato chips, nuts, or chocolate  ? Choose water or calorie-free drinks instead of soda or sweetened drinks  · Do not shop for groceries when you are hungry  You may be more likely to make unhealthy food choices  Take a grocery list of healthy foods and shop after you have eaten  · Eat regular meals  Do not skip meals  Skipping meals can lead to overeating later in the day  This can make it harder for you to lose weight  Eat a healthy snack in place of a meal if you do not have time to eat a regular meal  Talk with a dietitian to help you create a meal plan and schedule that is right for you  Other things to consider as you try to lose weight:   · Be aware of situations that may give you the urge to overeat, such as eating while watching television  Find ways to avoid these situations  For example, read a book, go for a walk, or do crafts  · Meet with a weight loss support group or friends who are also trying to lose weight  This may help you stay motivated to continue working on your weight loss goals  © Copyright [address] Information is for End User's use only and may not be sold, redistributed or otherwise used for commercial purposes   All illustrations and images included in B5M. are the copyrighted property of A D A M , Inc  or [facility]   The above information is an  only  It is not intended as medical advice for individual conditions or treatments  Talk to your doctor, nurse or pharmacist before following any medical regimen to see if it is safe and effective for you  Heart Healthy Diet   AMBULATORY CARE:   A heart healthy diet  is an eating plan low in unhealthy fats and sodium (salt)  The plan is high in healthy fats and fiber  A heart healthy diet helps improve your cholesterol levels and lowers your risk for heart disease and stroke  A dietitian will teach you how to read and understand food labels  Heart healthy diet guidelines to follow:   · Choose foods that contain healthy fats  ? Unsaturated fats  include monounsaturated and polyunsaturated fats  Unsaturated fat is found in foods such as soybean, canola, olive, corn, and safflower oils  It is also found in soft tub margarine that is made with liquid vegetable oil  ? Omega-3 fat  is found in certain fish, such as salmon, tuna, and trout, and in walnuts and flaxseed  Eat fish high in omega-3 fats at least 2 times a week  · Get 20 to 30 grams of fiber each day  Fruits, vegetables, whole-grain foods, and legumes (cooked beans) are good sources of fiber  · Limit or do not have unhealthy fats  ? Cholesterol  is found in animal foods, such as eggs and lobster, and in dairy products made from whole milk  Limit cholesterol to less than 200 mg each day  ? Saturated fat  is found in meats, such as march and hamburger  It is also found in chicken or turkey skin, whole milk, and butter  Limit saturated fat to less than 7% of your total daily calories  ? Trans fat  is found in packaged foods, such as potato chips and cookies  It is also in hard margarine, some fried foods, and shortening  Do not eat foods that contain trans fats  · Limit sodium as directed    You may be told to limit sodium to 2,000 to 2,300 mg each day  Choose low-sodium or no-salt-added foods  Add little or no salt to food you prepare  Use herbs and spices in place of salt  Include the following in your heart healthy plan:  Ask your dietitian or healthcare provider how many servings to have from each of the following food groups:  · Grains:      ? Whole-wheat breads, cereals, and pastas, and brown rice    ? Low-fat, low-sodium crackers and chips    · Vegetables:      ? Broccoli, green beans, green peas, and spinach    ? Collards, kale, and lima beans    ? Carrots, sweet potatoes, tomatoes, and peppers    ? Canned vegetables with no salt added    · Fruits:      ? Bananas, peaches, pears, and pineapple    ? Grapes, raisins, and dates    ? Oranges, tangerines, grapefruit, orange juice, and grapefruit juice    ? Apricots, mangoes, melons, and papaya    ? Raspberries and strawberries    ? Canned fruit with no added sugar    · Low-fat dairy:      ? Nonfat (skim) milk, 1% milk, and low-fat almond, cashew, or soy milks fortified with calcium    ? Low-fat cheese, regular or frozen yogurt, and cottage cheese    · Meats and proteins:      ? Lean cuts of beef and pork (loin, leg, round), skinless chicken and turkey    ? Legumes, soy products, egg whites, or nuts    Limit or do not include the following in your heart healthy plan:   · Unhealthy fats and oils:      ? Whole or 2% milk, cream cheese, sour cream, or cheese    ? High-fat cuts of beef (T-bone steaks, ribs), chicken or turkey with skin, and organ meats such as liver    ? Butter, stick margarine, shortening, and cooking oils such as coconut or palm oil    · Foods and liquids high in sodium:      ? Packaged foods, such as frozen dinners, cookies, macaroni and cheese, and cereals with more than 300 mg of sodium per serving    ? Vegetables with added sodium, such as instant potatoes, vegetables with added sauces, or regular canned vegetables    ?  Cured or smoked meats, such as hot dogs, march, and sausage    ? High-sodium ketchup, barbecue sauce, salad dressing, pickles, olives, soy sauce, or miso    · Foods and liquids high in sugar:      ? Candy, cake, cookies, pies, or doughnuts    ? Soft drinks (soda), sports drinks, or sweetened tea    ? Canned or dry mixes for cakes, soups, sauces, or gravies    Other healthy heart guidelines:   · Do not smoke  Nicotine and other chemicals in cigarettes and cigars can cause lung and heart damage  Ask your healthcare provider for information if you currently smoke and need help to quit  E-cigarettes or smokeless tobacco still contain nicotine  Talk to your healthcare provider before you use these products  · Limit or do not drink alcohol as directed  Alcohol can damage your heart and raise your blood pressure  Your healthcare provider may give you specific daily and weekly limits  The general recommended limit is 1 drink a day for women 21 or older and for men 72 or older  Do not have more than 3 drinks in a day or 7 in a week  The recommended limit is 2 drinks a day for men 24to 59years of age  Do not have more than 4 drinks in a day or 14 in a week  A drink of alcohol is 12 ounces of beer, 5 ounces of wine, or 1½ ounces of liquor  · Exercise regularly  Exercise can help you maintain a healthy weight and improve your blood pressure and cholesterol levels  Regular exercise can also decrease your risk for heart problems  Ask your healthcare provider about the best exercise plan for you  Do not start an exercise program without asking your healthcare provider  Follow up with your doctor or cardiologist as directed:  Write down your questions so you remember to ask them during your visits  © Copyright [address] Information is for End User's use only and may not be sold, redistributed or otherwise used for commercial purposes   All illustrations and images included in CareNotes® are the copyrighted property of A  [employer] , Inc  or [facility]  The above information is an  only  It is not intended as medical advice for individual conditions or treatments  Talk to your doctor, nurse or pharmacist before following any medical regimen to see if it is safe and effective for you  Chief Complaint     Chief Complaint   Patient presents with    Follow-up         History of Present Illness       Medical management-meds are reviewed and reconciled  No recent labs with sides PT/INR   1) hypertension-good levels today with current medications  2) paroxysmal atrial fibrillation-now post ablation in remains in sinus rhythm on medications  This is followed by Cardiology Dr Murillo  3) Leiden 5 factor deficiency on long-term Coumadin  4) depression-stable on current medications  5) sleep apnea-stable on CPAP  6) obesity-continues to be an issue  Review of Systems   Review of Systems   Constitutional: Negative for activity change, appetite change, diaphoresis and fatigue  Respiratory: Negative for cough, chest tightness, shortness of breath and wheezing  Cardiovascular: Negative for chest pain, palpitations and leg swelling  Fast or slow heart rate   Gastrointestinal: Negative for abdominal pain, blood in stool, constipation, diarrhea, nausea and vomiting  Genitourinary: Negative for difficulty urinating, dysuria, frequency and hematuria  Musculoskeletal: Negative for arthralgias, gait problem, joint swelling and myalgias  Neurological: Negative for dizziness, light-headedness and headaches  Psychiatric/Behavioral: Negative for agitation, confusion, dysphoric mood and sleep disturbance  The patient is not nervous/anxious            Current Medications       Current Outpatient Medications:     celecoxib (CeleBREX) 200 mg capsule, Take 1 capsule (200 mg total) by mouth daily, Disp: 90 capsule, Rfl: 1    [facility] Vitamin C 500 MG tablet, TAKE 1 TABLET BY MOUTH 2 TIMES A DAY, Disp: 100 tablet, Rfl: 0    Diclofenac Sodium (VOLTAREN EX), Apply topically, Disp: , Rfl:     DULoxetine (CYMBALTA) 60 mg delayed release capsule, Take 1 capsule (60 mg total) by mouth daily, Disp: 90 capsule, Rfl: 1    metoprolol succinate (TOPROL-XL) 100 mg 24 hr tablet, TAKE 1 TABLET BY MOUTH  DAILY, Disp: 90 tablet, Rfl: 0    pravastatin (PRAVACHOL) 40 mg tablet, TAKE 1 TABLET BY MOUTH  DAILY, Disp: 90 tablet, Rfl: 1    tamsulosin (FLOMAX) 0 4 mg, Take 1 capsule (0 4 mg total) by mouth daily with dinner, Disp: 90 capsule, Rfl: 1    warfarin (COUMADIN) 5 mg tablet, Take warfarin dose daily as directed by PT/INR   5 mg wed & sat, 2 5 mg rest of week, Disp: 90 tablet, Rfl: 1    zolpidem (AMBIEN) 10 mg tablet, TAKE 1 TABLET BY MOUTH  DAILY AT BEDTIME AS NEEDED  FOR SLEEP, Disp: 90 tablet, Rfl: 1    flecainide (TAMBOCOR) 100 mg tablet, TAKE 1 TABLET BY MOUTH  TWICE DAILY, Disp: 180 tablet, Rfl: 1    Current Facility-Administered Medications:     bupivacaine (MARCAINE) 0 25 % injection 4 mL, 4 mL, Injection, , Romy Lord MD, 4 mL at 12/03/20 1003    methylPREDNISolone acetate (DEPO-MEDROL) injection 1 mL, 1 mL, Intra-articular, , Romy Lord MD, 1 mL at 12/03/20 1003    Current Allergies     Allergies as of 06/14/2021 - Reviewed 06/14/2021   Allergen Reaction Noted    Morphine GI Intolerance 09/11/2018    Vitamin k Other (See Comments) 12/28/2020            The following portions of the patient's history were reviewed and updated as appropriate: allergies, current medications, past family history, past medical history, past social history, past surgical history and problem list      Past Medical History:   Diagnosis Date    Acute venous embolism and thrombosis of deep vessels of proximal lower extremity (HCC)     Last assessed: 5/18/15    Arthritis     Cellulitis     LE    CPAP (continuous positive airway pressure) dependence     Factor V Leiden ([address] )     Forgetfulness     [place] (hard of hearing)  Paroxysmal atrial fibrillation (HCC)     Last assessed: 11/2/15    Sleep apnea        Past Surgical History:   Procedure Laterality Date    BACK SURGERY      Lower    COLON SURGERY      COLONOSCOPY         Family History   Problem Relation Age of Onset    Lung cancer Mother     No Known Problems Father     Heart attack Brother     Atrial fibrillation Brother     Emphysema Sister          Medications have been verified  Objective   /66   Pulse 59   Temp 98 °F (36 7 °C) (Tympanic)   Ht 6' (1 829 m)   Wt 115 kg (253 lb)   SpO2 96%   BMI 34 31 kg/m²        Physical Exam     Physical Exam  Vitals reviewed  Constitutional:       General: He is not in acute distress  Appearance: He is well-developed  He is not diaphoretic  HENT:      Head: Normocephalic and atraumatic  Right Ear: Tympanic membrane, ear canal and external ear normal       Left Ear: Tympanic membrane, ear canal and external ear normal       Nose: No mucosal edema or rhinorrhea  Right Sinus: No maxillary sinus tenderness  Left Sinus: No maxillary sinus tenderness  Mouth/Throat:      Mouth: Mucous membranes are not pale and not dry  Dentition: Normal dentition  Pharynx: Uvula midline  No oropharyngeal exudate  Eyes:      General:         Right eye: No discharge  Left eye: No discharge  Pupils: Pupils are equal, round, and reactive to light  Neck:      Thyroid: No thyromegaly  Cardiovascular:      Rate and Rhythm: Normal rate and regular rhythm  Heart sounds: Murmur heard  Systolic murmur is present with a grade of 1/6  No gallop  Pulmonary:      Effort: Pulmonary effort is normal  No respiratory distress  Breath sounds: Normal breath sounds  No wheezing or rales  Musculoskeletal:         General: No tenderness  Normal range of motion  Cervical back: Normal range of motion and neck supple  Lymphadenopathy:      Cervical: No cervical adenopathy  Neurological:      Mental Status: He is alert and oriented to person, place, and time  Psychiatric:         Mood and Affect: Mood normal          Behavior: Behavior normal                  BMI Counseling: Body mass index is 34 31 kg/m²  The BMI is above normal  Nutrition recommendations include reducing portion sizes, decreasing overall calorie intake and 3-5 servings of fruits/vegetables daily

## 2021-06-14 NOTE — PATIENT INSTRUCTIONS
Medical management-continue current medications  Get extra labs with next PT INR  Follow-up with Cardiology  Continue work on diet, exercise as able, and weight loss  Recheck 5-6 months for Medicare wellness after October 27, 2021  Weight Management   AMBULATORY CARE:   Why it is important to manage your weight:  Being overweight increases your risk of health conditions such as heart disease, high blood pressure, type 2 diabetes, and certain types of cancer  It can also increase your risk for osteoarthritis, sleep apnea, and other respiratory problems  Aim for a slow, steady weight loss  Even a small amount of weight loss can lower your risk of health problems  How to lose weight safely:  A safe and healthy way to lose weight is to eat fewer calories and get regular exercise  · You can lose up about 1 pound a week by decreasing the number of calories you eat by 500 calories each day  You can decrease calories by eating smaller portion sizes or by cutting out high-calorie foods  Read labels to find out how many calories are in the foods you eat  · You can also burn calories with exercise such as walking, swimming, or biking  You will be more likely to keep weight off if you make these changes part of your lifestyle  Exercise at least 30 minutes per day on most days of the week  You can also fit in more physical activity by taking the stairs instead of the elevator or parking farther away from stores  Ask your healthcare provider about the best exercise plan for you  Healthy meal plan for weight management:  A healthy meal plan includes a variety of foods, contains fewer calories, and helps you stay healthy  A healthy meal plan includes the following:     · Eat whole-grain foods more often  A healthy meal plan should contain fiber  Fiber is the part of grains, fruits, and vegetables that is not broken down by your body  Whole-grain foods are healthy and provide extra fiber in your diet   Some examples of whole-grain foods are whole-wheat breads and pastas, oatmeal, brown rice, and bulgur  · Eat a variety of vegetables every day  Include dark, leafy greens such as spinach, kale, jens greens, and mustard greens  Eat yellow and orange vegetables such as carrots, sweet potatoes, and winter squash  · Eat a variety of fruits every day  Choose fresh or canned fruit (canned in its own juice or light syrup) instead of juice  Fruit juice has very little or no fiber  · Eat low-fat dairy foods  Drink fat-free (skim) milk or 1% milk  Eat fat-free yogurt and low-fat cottage cheese  Try low-fat cheeses such as mozzarella and other reduced-fat cheeses  · Choose meat and other protein foods that are low in fat  Choose beans or other legumes such as split peas or lentils  Choose fish, skinless poultry (chicken or turkey), or lean cuts of red meat (beef or pork)  Before you cook meat or poultry, cut off any visible fat  · Use less fat and oil  Try baking foods instead of frying them  Add less fat, such as margarine, sour cream, regular salad dressing and mayonnaise to foods  Eat fewer high-fat foods  Some examples of high-fat foods include french fries, doughnuts, ice cream, and cakes  · Eat fewer sweets  Limit foods and drinks that are high in sugar  This includes candy, cookies, regular soda, and sweetened drinks  Ways to decrease calories:   · Eat smaller portions  ? Use a small plate with smaller servings  ? Do not eat second helpings  ? When you eat at a restaurant, ask for a box and place half of your meal in the box before you eat  ? Share an entrée with someone else  · Replace high-calorie snacks with healthy, low-calorie snacks  ? Choose fresh fruit, vegetables, fat-free rice cakes, or air-popped popcorn instead of potato chips, nuts, or chocolate  ? Choose water or calorie-free drinks instead of soda or sweetened drinks      · Do not shop for groceries when you are hungry  You may be more likely to make unhealthy food choices  Take a grocery list of healthy foods and shop after you have eaten  · Eat regular meals  Do not skip meals  Skipping meals can lead to overeating later in the day  This can make it harder for you to lose weight  Eat a healthy snack in place of a meal if you do not have time to eat a regular meal  Talk with a dietitian to help you create a meal plan and schedule that is right for you  Other things to consider as you try to lose weight:   · Be aware of situations that may give you the urge to overeat, such as eating while watching television  Find ways to avoid these situations  For example, read a book, go for a walk, or do crafts  · Meet with a weight loss support group or friends who are also trying to lose weight  This may help you stay motivated to continue working on your weight loss goals  © Copyright [address] Information is for End User's use only and may not be sold, redistributed or otherwise used for commercial purposes  All illustrations and images included in CareNotes® are the copyrighted property of [employer]  or [address] [place] [address]  The above information is an  only  It is not intended as medical advice for individual conditions or treatments  Talk to your doctor, nurse or pharmacist before following any medical regimen to see if it is safe and effective for you  Heart Healthy Diet   AMBULATORY CARE:   A heart healthy diet  is an eating plan low in unhealthy fats and sodium (salt)  The plan is high in healthy fats and fiber  A heart healthy diet helps improve your cholesterol levels and lowers your risk for heart disease and stroke  A dietitian will teach you how to read and understand food labels  Heart healthy diet guidelines to follow:   · Choose foods that contain healthy fats  ? Unsaturated fats  include monounsaturated and polyunsaturated fats   Unsaturated fat is found in foods such as soybean, canola, olive, corn, and safflower oils  It is also found in soft tub margarine that is made with liquid vegetable oil  ? Omega-3 fat  is found in certain fish, such as salmon, tuna, and trout, and in walnuts and flaxseed  Eat fish high in omega-3 fats at least 2 times a week  · Get 20 to 30 grams of fiber each day  Fruits, vegetables, whole-grain foods, and legumes (cooked beans) are good sources of fiber  · Limit or do not have unhealthy fats  ? Cholesterol  is found in animal foods, such as eggs and lobster, and in dairy products made from whole milk  Limit cholesterol to less than 200 mg each day  ? Saturated fat  is found in meats, such as march and hamburger  It is also found in chicken or turkey skin, whole milk, and butter  Limit saturated fat to less than 7% of your total daily calories  ? Trans fat  is found in packaged foods, such as potato chips and cookies  It is also in hard margarine, some fried foods, and shortening  Do not eat foods that contain trans fats  · Limit sodium as directed  You may be told to limit sodium to 2,000 to 2,300 mg each day  Choose low-sodium or no-salt-added foods  Add little or no salt to food you prepare  Use herbs and spices in place of salt  Include the following in your heart healthy plan:  Ask your dietitian or healthcare provider how many servings to have from each of the following food groups:  · Grains:      ? Whole-wheat breads, cereals, and pastas, and brown rice    ? Low-fat, low-sodium crackers and chips    · Vegetables:      ? Broccoli, green beans, green peas, and spinach    ? Collards, kale, and lima beans    ? Carrots, sweet potatoes, tomatoes, and peppers    ? Canned vegetables with no salt added    · Fruits:      ? Bananas, peaches, pears, and pineapple    ? Grapes, raisins, and dates    ? Oranges, tangerines, grapefruit, orange juice, and grapefruit juice    ?  Apricots, mangoes, melons, and papaya    ? Raspberries and strawberries    ? Canned fruit with no added sugar    · Low-fat dairy:      ? Nonfat (skim) milk, 1% milk, and low-fat almond, cashew, or soy milks fortified with calcium    ? Low-fat cheese, regular or frozen yogurt, and cottage cheese    · Meats and proteins:      ? Lean cuts of beef and pork (loin, leg, round), skinless chicken and turkey    ? Legumes, soy products, egg whites, or nuts    Limit or do not include the following in your heart healthy plan:   · Unhealthy fats and oils:      ? Whole or 2% milk, cream cheese, sour cream, or cheese    ? High-fat cuts of beef (T-bone steaks, ribs), chicken or turkey with skin, and organ meats such as liver    ? Butter, stick margarine, shortening, and cooking oils such as coconut or palm oil    · Foods and liquids high in sodium:      ? Packaged foods, such as frozen dinners, cookies, macaroni and cheese, and cereals with more than 300 mg of sodium per serving    ? Vegetables with added sodium, such as instant potatoes, vegetables with added sauces, or regular canned vegetables    ? Cured or smoked meats, such as hot dogs, march, and sausage    ? High-sodium ketchup, barbecue sauce, salad dressing, pickles, olives, soy sauce, or miso    · Foods and liquids high in sugar:      ? Candy, cake, cookies, pies, or doughnuts    ? Soft drinks (soda), sports drinks, or sweetened tea    ? Canned or dry mixes for cakes, soups, sauces, or gravies    Other healthy heart guidelines:   · Do not smoke  Nicotine and other chemicals in cigarettes and cigars can cause lung and heart damage  Ask your healthcare provider for information if you currently smoke and need help to quit  E-cigarettes or smokeless tobacco still contain nicotine  Talk to your healthcare provider before you use these products  · Limit or do not drink alcohol as directed  Alcohol can damage your heart and raise your blood pressure   Your healthcare provider may give you specific daily and weekly limits  The general recommended limit is 1 drink a day for women 21 or older and for men 72 or older  Do not have more than 3 drinks in a day or 7 in a week  The recommended limit is 2 drinks a day for men 24to 59years of age  Do not have more than 4 drinks in a day or 14 in a week  A drink of alcohol is 12 ounces of beer, 5 ounces of wine, or 1½ ounces of liquor  · Exercise regularly  Exercise can help you maintain a healthy weight and improve your blood pressure and cholesterol levels  Regular exercise can also decrease your risk for heart problems  Ask your healthcare provider about the best exercise plan for you  Do not start an exercise program without asking your healthcare provider  Follow up with your doctor or cardiologist as directed:  Write down your questions so you remember to ask them during your visits  © Copyright [facility] [address] Information is for End User's use only and may not be sold, redistributed or otherwise used for commercial purposes  All illustrations and images included in CareNotes® are the copyrighted property of [employer]  or [facility]   The above information is an  only  It is not intended as medical advice for individual conditions or treatments  Talk to your doctor, nurse or pharmacist before following any medical regimen to see if it is safe and effective for you

## 2021-06-23 ENCOUNTER — TELEPHONE (OUTPATIENT)
Dept: FAMILY MEDICINE CLINIC | Facility: CLINIC | Age: 71
End: 2021-06-23

## 2021-06-23 LAB
INR PPP: 2.9 (ref 0.9–1.2)
PROTHROMBIN TIME: 28.8 SEC (ref 9.1–12)

## 2021-06-23 NOTE — TELEPHONE ENCOUNTER
A sticky note was left on my desk earlier to call wife -- no reason as to why  When I called her back she stated that patient had a headache and that he never gets one  He told her he just needed to lay down for awhile  She states he took Ibu which I cautioned her against as he is on Warfarin  She states she is at the grocery store now, but, will see how he is when she gets home  Advised if he is still not feeling well and is concerned OK to go to ER tonight for evaluation      Message sent to Dr Dario Parson for [facility]

## 2021-06-24 ENCOUNTER — ANTICOAG VISIT (OUTPATIENT)
Dept: FAMILY MEDICINE CLINIC | Facility: CLINIC | Age: 71
End: 2021-06-24

## 2021-06-24 ENCOUNTER — TELEPHONE (OUTPATIENT)
Dept: FAMILY MEDICINE CLINIC | Facility: CLINIC | Age: 71
End: 2021-06-24

## 2021-06-24 LAB
ALBUMIN SERPL-MCNC: 4.3 G/DL (ref 3.8–4.8)
ALBUMIN/GLOB SERPL: 1.8 {RATIO} (ref 1.2–2.2)
ALP SERPL-CCNC: 60 IU/L (ref 48–121)
ALT SERPL-CCNC: 18 IU/L (ref 0–44)
AST SERPL-CCNC: 17 IU/L (ref 0–40)
BILIRUB SERPL-MCNC: 0.5 MG/DL (ref 0–1.2)
BUN SERPL-MCNC: 16 MG/DL (ref 8–27)
BUN/CREAT SERPL: 17 (ref 10–24)
CALCIUM SERPL-MCNC: 9.5 MG/DL (ref 8.6–10.2)
CHLORIDE SERPL-SCNC: 101 MMOL/L (ref 96–106)
CO2 SERPL-SCNC: 26 MMOL/L (ref 20–29)
CREAT SERPL-MCNC: 0.92 MG/DL (ref 0.76–1.27)
EST. AVERAGE GLUCOSE BLD GHB EST-MCNC: 137 MG/DL
GLOBULIN SER-MCNC: 2.4 G/DL (ref 1.5–4.5)
GLUCOSE SERPL-MCNC: 135 MG/DL (ref 65–99)
HBA1C MFR BLD: 6.4 % (ref 4.8–5.6)
POTASSIUM SERPL-SCNC: 4.5 MMOL/L (ref 3.5–5.2)
PROT SERPL-MCNC: 6.7 G/DL (ref 6–8.5)
SL AMB EGFR AFRICAN AMERICAN: 97 ML/MIN/1.73
SL AMB EGFR NON AFRICAN AMERICAN: 84 ML/MIN/1.73
SODIUM SERPL-SCNC: 141 MMOL/L (ref 134–144)

## 2021-06-24 NOTE — RESULT ENCOUNTER NOTE
Call patient with lab result-sugar slightly higher A1C still in prediabetic range  Continue to watch diet, increase exercise as able  He was just in  Also has PT INR report

## 2021-06-24 NOTE — TELEPHONE ENCOUNTER
Message left as per Dr Олег Nieves to call with any questions       ----- Message from Carlos Carlton MD sent at 6/24/2021  9:47 AM EDT -----  Call patient with lab result-sugar slightly higher A1C still in prediabetic range  Continue to watch diet, increase exercise as able  He was just in  Also has PT INR report

## 2021-07-13 LAB
INR PPP: 1.9 (ref 0.9–1.2)
PROTHROMBIN TIME: 19.5 SEC (ref 9.1–12)

## 2021-07-15 ENCOUNTER — ANTICOAG VISIT (OUTPATIENT)
Dept: FAMILY MEDICINE CLINIC | Facility: CLINIC | Age: 71
End: 2021-07-15

## 2021-07-23 DIAGNOSIS — Z79.01 ANTICOAGULATED ON COUMADIN: ICD-10-CM

## 2021-07-23 DIAGNOSIS — I48.91 ATRIAL FIBRILLATION, UNSPECIFIED TYPE (HCC): ICD-10-CM

## 2021-07-23 DIAGNOSIS — I10 ESSENTIAL HYPERTENSION: ICD-10-CM

## 2021-07-23 DIAGNOSIS — Z79.01 LONG TERM CURRENT USE OF ANTICOAGULANT: ICD-10-CM

## 2021-07-26 DIAGNOSIS — Z79.01 LONG TERM CURRENT USE OF ANTICOAGULANT: Primary | ICD-10-CM

## 2021-07-26 DIAGNOSIS — D68.51 FACTOR V LEIDEN MUTATION (HCC): ICD-10-CM

## 2021-07-26 RX ORDER — WARFARIN SODIUM 5 MG/1
TABLET ORAL
Qty: 90 TABLET | Refills: 1 | Status: ON HOLD | OUTPATIENT
Start: 2021-07-26 | End: 2022-03-29 | Stop reason: SDUPTHER

## 2021-07-26 RX ORDER — METOPROLOL SUCCINATE 100 MG/1
TABLET, EXTENDED RELEASE ORAL
Qty: 90 TABLET | Refills: 0 | Status: SHIPPED | OUTPATIENT
Start: 2021-07-26 | End: 2021-10-05

## 2021-07-27 ENCOUNTER — ANTICOAG VISIT (OUTPATIENT)
Dept: FAMILY MEDICINE CLINIC | Facility: CLINIC | Age: 71
End: 2021-07-27

## 2021-07-27 LAB
INR PPP: 1.8 (ref 0.9–1.2)
PROTHROMBIN TIME: 18.6 SEC (ref 9.1–12)

## 2021-08-10 DIAGNOSIS — I48.91 ATRIAL FIBRILLATION, UNSPECIFIED TYPE (HCC): ICD-10-CM

## 2021-08-10 DIAGNOSIS — D68.51 FACTOR V LEIDEN MUTATION (HCC): Primary | ICD-10-CM

## 2021-08-10 DIAGNOSIS — Z79.01 ANTICOAGULATED ON COUMADIN: ICD-10-CM

## 2021-08-10 DIAGNOSIS — Z79.01 LONG TERM CURRENT USE OF ANTICOAGULANT: ICD-10-CM

## 2021-08-11 ENCOUNTER — ANTICOAG VISIT (OUTPATIENT)
Dept: FAMILY MEDICINE CLINIC | Facility: CLINIC | Age: 71
End: 2021-08-11

## 2021-08-11 DIAGNOSIS — Z79.01 LONG TERM CURRENT USE OF ANTICOAGULANT: ICD-10-CM

## 2021-08-11 DIAGNOSIS — D68.51 FACTOR V LEIDEN MUTATION (HCC): Primary | ICD-10-CM

## 2021-08-11 LAB
INR PPP: 2.3 (ref 0.9–1.2)
PROTHROMBIN TIME: 23.3 SEC (ref 9.1–12)

## 2021-08-18 ENCOUNTER — OFFICE VISIT (OUTPATIENT)
Dept: OBGYN CLINIC | Facility: CLINIC | Age: 71
End: 2021-08-18
Payer: COMMERCIAL

## 2021-08-18 VITALS
BODY MASS INDEX: 33.05 KG/M2 | HEART RATE: 74 BPM | HEIGHT: 72 IN | DIASTOLIC BLOOD PRESSURE: 80 MMHG | SYSTOLIC BLOOD PRESSURE: 126 MMHG | WEIGHT: 244 LBS

## 2021-08-18 DIAGNOSIS — M17.11 ARTHRITIS OF RIGHT KNEE: Primary | ICD-10-CM

## 2021-08-18 DIAGNOSIS — M17.12 ARTHRITIS OF LEFT KNEE: ICD-10-CM

## 2021-08-18 PROCEDURE — 1160F RVW MEDS BY RX/DR IN RCRD: CPT | Performed by: ORTHOPAEDIC SURGERY

## 2021-08-18 PROCEDURE — NC001 PR NO CHARGE: Performed by: ORTHOPAEDIC SURGERY

## 2021-08-18 PROCEDURE — 20610 DRAIN/INJ JOINT/BURSA W/O US: CPT | Performed by: ORTHOPAEDIC SURGERY

## 2021-08-18 PROCEDURE — 3074F SYST BP LT 130 MM HG: CPT | Performed by: ORTHOPAEDIC SURGERY

## 2021-08-18 PROCEDURE — 3008F BODY MASS INDEX DOCD: CPT | Performed by: ORTHOPAEDIC SURGERY

## 2021-08-18 PROCEDURE — 1036F TOBACCO NON-USER: CPT | Performed by: ORTHOPAEDIC SURGERY

## 2021-08-18 PROCEDURE — 3079F DIAST BP 80-89 MM HG: CPT | Performed by: ORTHOPAEDIC SURGERY

## 2021-08-18 RX ORDER — METHYLPREDNISOLONE ACETATE 40 MG/ML
1 INJECTION, SUSPENSION INTRA-ARTICULAR; INTRALESIONAL; INTRAMUSCULAR; SOFT TISSUE
Status: COMPLETED | OUTPATIENT
Start: 2021-08-18 | End: 2021-08-18

## 2021-08-18 RX ORDER — BUPIVACAINE HYDROCHLORIDE 2.5 MG/ML
4 INJECTION, SOLUTION INFILTRATION; PERINEURAL
Status: COMPLETED | OUTPATIENT
Start: 2021-08-18 | End: 2021-08-18

## 2021-08-18 RX ADMIN — METHYLPREDNISOLONE ACETATE 1 ML: 40 INJECTION, SUSPENSION INTRA-ARTICULAR; INTRALESIONAL; INTRAMUSCULAR; SOFT TISSUE at 13:27

## 2021-08-18 RX ADMIN — BUPIVACAINE HYDROCHLORIDE 4 ML: 2.5 INJECTION, SOLUTION INFILTRATION; PERINEURAL at 13:27

## 2021-09-02 ENCOUNTER — ANTICOAG VISIT (OUTPATIENT)
Dept: FAMILY MEDICINE CLINIC | Facility: CLINIC | Age: 71
End: 2021-09-02

## 2021-09-02 LAB
INR PPP: 2.3 (ref 0.9–1.2)
PROTHROMBIN TIME: 22.9 SEC (ref 9.1–12)

## 2021-09-16 ENCOUNTER — OFFICE VISIT (OUTPATIENT)
Dept: NEUROLOGY | Facility: CLINIC | Age: 71
End: 2021-09-16
Payer: COMMERCIAL

## 2021-09-16 ENCOUNTER — TELEPHONE (OUTPATIENT)
Dept: NEUROLOGY | Facility: CLINIC | Age: 71
End: 2021-09-16

## 2021-09-16 VITALS
DIASTOLIC BLOOD PRESSURE: 70 MMHG | HEIGHT: 72 IN | TEMPERATURE: 97.1 F | HEART RATE: 51 BPM | SYSTOLIC BLOOD PRESSURE: 132 MMHG | BODY MASS INDEX: 33.32 KG/M2 | WEIGHT: 246 LBS

## 2021-09-16 DIAGNOSIS — R41.3 MEMORY DIFFICULTY: Primary | ICD-10-CM

## 2021-09-16 PROCEDURE — 3078F DIAST BP <80 MM HG: CPT | Performed by: PSYCHIATRY & NEUROLOGY

## 2021-09-16 PROCEDURE — 1160F RVW MEDS BY RX/DR IN RCRD: CPT | Performed by: PSYCHIATRY & NEUROLOGY

## 2021-09-16 PROCEDURE — 99214 OFFICE O/P EST MOD 30 MIN: CPT | Performed by: PSYCHIATRY & NEUROLOGY

## 2021-09-16 PROCEDURE — 3008F BODY MASS INDEX DOCD: CPT | Performed by: PSYCHIATRY & NEUROLOGY

## 2021-09-16 PROCEDURE — 3075F SYST BP GE 130 - 139MM HG: CPT | Performed by: PSYCHIATRY & NEUROLOGY

## 2021-09-16 PROCEDURE — 1036F TOBACCO NON-USER: CPT | Performed by: PSYCHIATRY & NEUROLOGY

## 2021-09-16 NOTE — TELEPHONE ENCOUNTER
Nursing, Please let pt know pulse in office today was 51, on lower side  Not sure where pt typically runs  Pt was asymptomatic  Pt is followed by cardiology due to history of afib  Please have him alert his cardiologist of this pulse to compare to prior values  At appt with dr Sreekanth Flowers in 9/8/20, heart rate was 51 at that time too  Please have pt check with cardiology if any further monitoring is needed  I am also ccing Dr Shah Busing on task as well as an FYI

## 2021-09-16 NOTE — PROGRESS NOTES
Patient ID: Ena Dutta is a 80 y o  male  Assessment/Plan:    Memory difficulty  Pt here for neuro follow up  Overall pt doing well  No new sxs currently  Pt notes no issues currently with judgement, directions, cooking or finances  Pt just got hearing aides this week and has already noticed improvement in attention to detail  Pt memory labs good  Re rev updated mri head  Pt neuro and cognitive exam stable at this time  Will continue to follow clinically  To consider neurocogntive eval if any worsening of sxs  Pt to call for any concerns  No recommendation for memory medication at this time         Diagnoses and all orders for this visit:    Memory difficulty           Subjective:    HPI    Pt is a 79 yo m with pmh of PAF on coumadin, WILL on CPAP , factor V Leyden mutation, hypercholesterolemia, h/o dvt who presents today for evaluation of memory issues  Pt last seen on 5/28/21 by me  Per my last note " Pts wife mentionned to pcp concerns for memory    Pt notes wife has noted some changes in his mood, more irritable and mean at times   Pt does not particularly feel he has had any change in temperament   Pt notes wife now with significant pulmonary issues , on oxygen and some issues with more arguments lately in accomodating these health issues    Pt notes wife has always done finances and cont to do so   Pt notes no issues with judgement, driving or adls   Pt is doing more around the home and groceries since wife illness   Pt notes he tries to be more mindful of pt condition and frequently cancels activities he would typically do to stay home with wife   Pt notes some more arguments lately with spouse   No specific new neuro sxs  Pt wife notes issues not specific memory related but more comprehension  Pt wife noted example of herself being outside too long and felt maybe like heat stroke but her spouse did not realize she was that ill and that was upsetting    Spouse also noted friend recently visited who had family member pass and she notes the patient did not greet or give condolences  Some of the issues are specific to communication  No clear judgement, hygiene or safety issues  Pt also with increased issues with hearing loss  Pt is in process of seeing ent and audiology which may be very helpful for retaining and registering of information"  Pt here for neuro follow up  Pt last seen in may  Overall pt notes no significant changes  Pt is able to recount instances where he has been communicating with wife and examples of his attention to details  Pt notes no change in mood or memory  Pt does some of the shopping, and meal prep especially since wife's illness  Pt notes he likes working out in the garden or relaxing in his basement  Pt is able to recount events of past week  Only issue he notes is sometimes remembering sports trivia from past    Pt notes no issues with naming , children, grandchildren  Pt able to recount and recognize even 's family member for his grandchildren  No clear issues with recognition  Pt feels overall very well  Pt notes wife illness increased stress in the home  He also notes he just got new hearing aides this past week and has already noticed improvement in details of conversation  He recounts using his aides last ingrid at dinner out in a [employer] and felt much better to contribute to conversation  Pt had no issues in office today even with both of us wearing masks  I did not have to repeat any of the conversation or questions for pt  acutally improved auditory processing noted today  No issues with meals, hygiene, financial dealing  Re rev mri head from prior visit  Re rev labs from prior visit  At this time no clear evidence for ongoing neurodegenerative process  Will need to continue to folllow for any clinical changes  Also offered neurocogntive eval for pt as baseline  Pt to keep eye on any worsening issues over the next few months    If any new concerns, we will consider neurocogntive testing if covered by insurance  No change in vision  No diplopia  No falls or trips  No loc  No sz  No change in bowel or bladder  No change in speech or swallowing  No ha or n or v   No vertigo  Pt continues to follow with cardiology  Remains on coumadin long term therapy  Pt does all own medication and does mail order for his rxs  The following portions of the patient's history were reviewed and updated as appropriate: allergies, current medications, past family history, past medical history, past social history, past surgical history and problem list and med rec and ros rev  Objective:    Blood pressure 132/70, pulse (!) 51, temperature (!) 97 1 °F (36 2 °C), height 6' (1 829 m), weight 112 kg (246 lb)  Physical Exam  Constitutional:       General: He is not in acute distress  Appearance: He is not ill-appearing  Eyes:      General: Lids are normal       Extraocular Movements: Extraocular movements intact  Pupils: Pupils are equal, round, and reactive to light  Musculoskeletal:      Right lower leg: No edema  Left lower leg: No edema  Neurological:      Mental Status: He is alert  Deep Tendon Reflexes: Strength normal       Reflex Scores:       Tricep reflexes are 2+ on the right side and 2+ on the left side  Bicep reflexes are 2+ on the right side and 2+ on the left side  Brachioradialis reflexes are 2+ on the right side and 2+ on the left side  Patellar reflexes are 2+ on the right side and 2+ on the left side  Achilles reflexes are 2+ on the right side and 2+ on the left side  Psychiatric:         Speech: Speech normal          Neurological Exam  Mental Status  Alert  Oriented to person, place and time  Recalls 3 of 3 objects immediately  Speech is normal  Language is fluent with no aphasia  Able to perform serial calculations  Able to spell words backwards      Cranial Nerves  CN II: Visual acuity is normal  Visual fields full to confrontation  Right funduscopic exam: disc intact  Left funduscopic exam: disc intact  CN III, IV, VI: Extraocular movements intact bilaterally  Normal lids and orbits bilaterally  Pupils equal round and reactive to light bilaterally  CN V: Facial sensation is normal   CN VII: Full and symmetric facial movement  CN VIII: Hearing is normal   CN IX, X: Palate elevates symmetrically  Normal gag reflex  CN XI: Shoulder shrug strength is normal   CN XII: Tongue midline without atrophy or fasciculations  Motor  Normal muscle bulk throughout  Normal muscle tone  Strength is 5/5 throughout all four extremities  Sensory  Sensation is intact to light touch, pinprick, vibration and proprioception in all four extremities  Reflexes                                           Right                      Left  Brachioradialis                    2+                         2+  Biceps                                 2+                         2+  Triceps                                2+                         2+  Finger flex                           2+                         2+  Hamstring                            2+                         2+  Patellar                                2+                         2+  Achilles                                2+                         2+    Coordination  Right: Finger-to-nose normal  Rapid alternating movement normal   Left: Finger-to-nose normal  Heel-to-shin normal     Gait  Casual gait is normal including stance, stride, and arm swing  ROS:    Review of Systems   Constitutional: Negative  Negative for appetite change and fever  HENT: Negative  Negative for hearing loss, tinnitus, trouble swallowing and voice change  Eyes: Negative  Negative for photophobia and pain  Respiratory: Negative  Negative for shortness of breath  Cardiovascular: Negative  Negative for palpitations  Gastrointestinal: Negative  Negative for nausea and vomiting  Endocrine: Negative  Negative for cold intolerance  Genitourinary: Negative  Negative for dysuria, frequency and urgency  Musculoskeletal: Negative  Negative for myalgias and neck pain  Skin: Negative  Negative for rash  Neurological: Negative  Negative for dizziness, tremors, seizures, syncope, facial asymmetry, speech difficulty, weakness, light-headedness, numbness and headaches  Hematological: Negative  Does not bruise/bleed easily  Psychiatric/Behavioral: Negative  Negative for confusion, hallucinations and sleep disturbance  All other systems reviewed and are negative

## 2021-09-22 LAB
INR PPP: 2.2 (ref 0.9–1.2)
PROTHROMBIN TIME: 22.4 SEC (ref 9.1–12)

## 2021-09-23 ENCOUNTER — ANTICOAG VISIT (OUTPATIENT)
Dept: FAMILY MEDICINE CLINIC | Facility: CLINIC | Age: 71
End: 2021-09-23

## 2021-09-23 DIAGNOSIS — D68.51 FACTOR V LEIDEN MUTATION (HCC): Primary | ICD-10-CM

## 2021-09-23 DIAGNOSIS — Z79.01 LONG TERM CURRENT USE OF ANTICOAGULANT: ICD-10-CM

## 2021-09-23 NOTE — PROGRESS NOTES
Paddy Arnold MD   9/22/2021  8:07 AM EDT  Call patient with lab result-same    3 new INR orders placed (total on chart is 4)

## 2021-10-05 DIAGNOSIS — E78.5 DYSLIPIDEMIA: ICD-10-CM

## 2021-10-05 DIAGNOSIS — M51.26 LUMBAR HERNIATED DISC: ICD-10-CM

## 2021-10-05 DIAGNOSIS — I48.91 ATRIAL FIBRILLATION, UNSPECIFIED TYPE (HCC): ICD-10-CM

## 2021-10-05 DIAGNOSIS — I10 ESSENTIAL HYPERTENSION: ICD-10-CM

## 2021-10-05 DIAGNOSIS — F32.A DEPRESSION, UNSPECIFIED DEPRESSION TYPE: ICD-10-CM

## 2021-10-05 DIAGNOSIS — N40.1 BPH WITH OBSTRUCTION/LOWER URINARY TRACT SYMPTOMS: ICD-10-CM

## 2021-10-05 DIAGNOSIS — N13.8 BPH WITH OBSTRUCTION/LOWER URINARY TRACT SYMPTOMS: ICD-10-CM

## 2021-10-05 RX ORDER — CELECOXIB 200 MG/1
CAPSULE ORAL
Qty: 90 CAPSULE | Refills: 1 | Status: ON HOLD | OUTPATIENT
Start: 2021-10-05 | End: 2022-03-23

## 2021-10-05 RX ORDER — TAMSULOSIN HYDROCHLORIDE 0.4 MG/1
CAPSULE ORAL
Qty: 90 CAPSULE | Refills: 1 | Status: SHIPPED | OUTPATIENT
Start: 2021-10-05 | End: 2022-06-10 | Stop reason: ALTCHOICE

## 2021-10-05 RX ORDER — DULOXETIN HYDROCHLORIDE 60 MG/1
CAPSULE, DELAYED RELEASE ORAL
Qty: 90 CAPSULE | Refills: 1 | Status: SHIPPED | OUTPATIENT
Start: 2021-10-05 | End: 2022-06-08 | Stop reason: ALTCHOICE

## 2021-10-05 RX ORDER — FLECAINIDE ACETATE 100 MG/1
TABLET ORAL
Qty: 180 TABLET | Refills: 1 | Status: SHIPPED | OUTPATIENT
Start: 2021-10-05 | End: 2022-08-04 | Stop reason: SDUPTHER

## 2021-10-05 RX ORDER — PRAVASTATIN SODIUM 40 MG
TABLET ORAL
Qty: 90 TABLET | Refills: 1 | Status: ON HOLD | OUTPATIENT
Start: 2021-10-05 | End: 2022-03-23

## 2021-10-05 RX ORDER — METOPROLOL SUCCINATE 100 MG/1
TABLET, EXTENDED RELEASE ORAL
Qty: 90 TABLET | Refills: 0 | Status: SHIPPED | OUTPATIENT
Start: 2021-10-05 | End: 2022-01-12

## 2021-10-11 DIAGNOSIS — G47.00 INSOMNIA, UNSPECIFIED TYPE: ICD-10-CM

## 2021-10-11 RX ORDER — ZOLPIDEM TARTRATE 10 MG/1
10 TABLET ORAL
Qty: 90 TABLET | Refills: 1 | Status: SHIPPED | OUTPATIENT
Start: 2021-10-11 | End: 2022-01-19 | Stop reason: SDUPTHER

## 2021-10-14 ENCOUNTER — ANTICOAG VISIT (OUTPATIENT)
Dept: FAMILY MEDICINE CLINIC | Facility: CLINIC | Age: 71
End: 2021-10-14

## 2021-10-14 LAB
INR PPP: 2.1 (ref 0.9–1.2)
PROTHROMBIN TIME: 21.4 SEC (ref 9.1–12)

## 2021-11-03 ENCOUNTER — TELEPHONE (OUTPATIENT)
Dept: FAMILY MEDICINE CLINIC | Facility: CLINIC | Age: 71
End: 2021-11-03

## 2021-11-03 ENCOUNTER — ANTICOAG VISIT (OUTPATIENT)
Dept: FAMILY MEDICINE CLINIC | Facility: CLINIC | Age: 71
End: 2021-11-03

## 2021-11-03 LAB
INR PPP: 2.2 (ref 0.9–1.2)
PROTHROMBIN TIME: 21.9 SEC (ref 9.1–12)

## 2021-11-04 ENCOUNTER — OFFICE VISIT (OUTPATIENT)
Dept: FAMILY MEDICINE CLINIC | Facility: CLINIC | Age: 71
End: 2021-11-04
Payer: COMMERCIAL

## 2021-11-04 VITALS
SYSTOLIC BLOOD PRESSURE: 126 MMHG | DIASTOLIC BLOOD PRESSURE: 78 MMHG | HEART RATE: 54 BPM | TEMPERATURE: 97.9 F | OXYGEN SATURATION: 98 % | HEIGHT: 71 IN | BODY MASS INDEX: 35.39 KG/M2 | WEIGHT: 252.8 LBS

## 2021-11-04 DIAGNOSIS — I48.0 PAROXYSMAL ATRIAL FIBRILLATION (HCC): ICD-10-CM

## 2021-11-04 DIAGNOSIS — R73.01 IFG (IMPAIRED FASTING GLUCOSE): ICD-10-CM

## 2021-11-04 DIAGNOSIS — E78.2 MIXED HYPERLIPIDEMIA: ICD-10-CM

## 2021-11-04 DIAGNOSIS — Z00.00 MEDICARE ANNUAL WELLNESS VISIT, SUBSEQUENT: Primary | ICD-10-CM

## 2021-11-04 DIAGNOSIS — G47.33 OSA (OBSTRUCTIVE SLEEP APNEA): ICD-10-CM

## 2021-11-04 DIAGNOSIS — I10 ESSENTIAL HYPERTENSION: ICD-10-CM

## 2021-11-04 DIAGNOSIS — B34.9 VIRAL INFECTION, UNSPECIFIED: ICD-10-CM

## 2021-11-04 DIAGNOSIS — Z23 FLU VACCINE NEED: ICD-10-CM

## 2021-11-04 PROCEDURE — U0003 INFECTIOUS AGENT DETECTION BY NUCLEIC ACID (DNA OR RNA); SEVERE ACUTE RESPIRATORY SYNDROME CORONAVIRUS 2 (SARS-COV-2) (CORONAVIRUS DISEASE [COVID-19]), AMPLIFIED PROBE TECHNIQUE, MAKING USE OF HIGH THROUGHPUT TECHNOLOGIES AS DESCRIBED BY CMS-2020-01-R: HCPCS | Performed by: FAMILY MEDICINE

## 2021-11-04 PROCEDURE — 3074F SYST BP LT 130 MM HG: CPT | Performed by: FAMILY MEDICINE

## 2021-11-04 PROCEDURE — 3078F DIAST BP <80 MM HG: CPT | Performed by: FAMILY MEDICINE

## 2021-11-04 PROCEDURE — 3725F SCREEN DEPRESSION PERFORMED: CPT | Performed by: FAMILY MEDICINE

## 2021-11-04 PROCEDURE — U0005 INFEC AGEN DETEC AMPLI PROBE: HCPCS | Performed by: FAMILY MEDICINE

## 2021-11-04 PROCEDURE — 99214 OFFICE O/P EST MOD 30 MIN: CPT | Performed by: FAMILY MEDICINE

## 2021-11-04 PROCEDURE — G0439 PPPS, SUBSEQ VISIT: HCPCS | Performed by: FAMILY MEDICINE

## 2021-11-04 PROCEDURE — 1101F PT FALLS ASSESS-DOCD LE1/YR: CPT | Performed by: FAMILY MEDICINE

## 2021-11-04 PROCEDURE — 90662 IIV NO PRSV INCREASED AG IM: CPT | Performed by: FAMILY MEDICINE

## 2021-11-04 PROCEDURE — G0008 ADMIN INFLUENZA VIRUS VAC: HCPCS | Performed by: FAMILY MEDICINE

## 2021-11-04 PROCEDURE — 1170F FXNL STATUS ASSESSED: CPT | Performed by: FAMILY MEDICINE

## 2021-11-04 PROCEDURE — 1125F AMNT PAIN NOTED PAIN PRSNT: CPT | Performed by: FAMILY MEDICINE

## 2021-11-04 PROCEDURE — 3288F FALL RISK ASSESSMENT DOCD: CPT | Performed by: FAMILY MEDICINE

## 2021-11-05 LAB — SARS-COV-2 RNA RESP QL NAA+PROBE: NEGATIVE

## 2021-11-09 ENCOUNTER — OFFICE VISIT (OUTPATIENT)
Dept: CARDIOLOGY CLINIC | Facility: CLINIC | Age: 71
End: 2021-11-09
Payer: COMMERCIAL

## 2021-11-09 VITALS
WEIGHT: 258.2 LBS | HEART RATE: 53 BPM | BODY MASS INDEX: 36.15 KG/M2 | DIASTOLIC BLOOD PRESSURE: 72 MMHG | SYSTOLIC BLOOD PRESSURE: 142 MMHG | HEIGHT: 71 IN

## 2021-11-09 DIAGNOSIS — I48.0 PAROXYSMAL ATRIAL FIBRILLATION (HCC): Primary | ICD-10-CM

## 2021-11-09 DIAGNOSIS — E78.5 DYSLIPIDEMIA: ICD-10-CM

## 2021-11-09 DIAGNOSIS — D68.51 FACTOR V LEIDEN MUTATION (HCC): ICD-10-CM

## 2021-11-09 DIAGNOSIS — E78.2 MIXED HYPERLIPIDEMIA: ICD-10-CM

## 2021-11-09 DIAGNOSIS — I10 ESSENTIAL HYPERTENSION: ICD-10-CM

## 2021-11-09 DIAGNOSIS — R60.0 LOWER EXTREMITY EDEMA: ICD-10-CM

## 2021-11-09 DIAGNOSIS — Z98.890 STATUS POST CATHETER ABLATION OF ATRIAL FLUTTER: ICD-10-CM

## 2021-11-09 PROCEDURE — 93000 ELECTROCARDIOGRAM COMPLETE: CPT | Performed by: INTERNAL MEDICINE

## 2021-11-09 PROCEDURE — 99214 OFFICE O/P EST MOD 30 MIN: CPT | Performed by: INTERNAL MEDICINE

## 2021-11-22 ENCOUNTER — HOSPITAL ENCOUNTER (OUTPATIENT)
Dept: NON INVASIVE DIAGNOSTICS | Age: 71
Discharge: HOME | End: 2021-11-22
Payer: COMMERCIAL

## 2021-11-22 VITALS
WEIGHT: 258 LBS | HEIGHT: 71 IN | BODY MASS INDEX: 36.12 KG/M2 | SYSTOLIC BLOOD PRESSURE: 142 MMHG | HEART RATE: 58 BPM | DIASTOLIC BLOOD PRESSURE: 88 MMHG

## 2021-11-22 DIAGNOSIS — I48.0 PAROXYSMAL ATRIAL FIBRILLATION (HCC): ICD-10-CM

## 2021-11-22 DIAGNOSIS — R60.0 LOWER EXTREMITY EDEMA: ICD-10-CM

## 2021-11-22 LAB
AORTIC ROOT: 3.3 CM
AORTIC VALVE MEAN VELOCITY: 16.7 M/S
APICAL FOUR CHAMBER EJECTION FRACTION: 59 %
ASCENDING AORTA: 3.6 CM
AV AREA BY CONTINUOUS VTI: 1.6 CM2
AV AREA PEAK VELOCITY: 1.5 CM2
AV LVOT MEAN GRADIENT: 2 MMHG
AV LVOT PEAK GRADIENT: 3 MMHG
AV MEAN GRADIENT: 13 MMHG
AV PEAK GRADIENT: 24 MMHG
AV VALVE AREA: 1.6 CM2
DOP CALC AO VTI: 59.42 CM
DOP CALC LVOT AREA: 4.15 CM2
DOP CALC LVOT DIAMETER: 2.3 CM
DOP CALC LVOT PEAK VEL VTI: 22.87 CM
DOP CALC LVOT PEAK VEL: 0.89 M/S
DOP CALC LVOT STROKE INDEX: 41.3 ML/M2
DOP CALC LVOT STROKE VOLUME: 94.97 CM3
E WAVE DECELERATION TIME: 184 MS
FRACTIONAL SHORTENING: 32 % (ref 28–44)
INTERVENTRICULAR SEPTUM IN DIASTOLE (PARASTERNAL SHORT AXIS VIEW): 1.3 CM
LEFT ATRIUM AREA SYSTOLE SINGLE PLANE A4C: 25.4 CM2
LEFT INTERNAL DIMENSION IN SYSTOLE: 3.6 CM (ref 2.1–4)
LEFT VENTRICULAR INTERNAL DIMENSION IN DIASTOLE: 5.3 CM (ref 10.31–15.36)
LEFT VENTRICULAR POSTERIOR WALL IN END DIASTOLE: 1.1 CM
LEFT VENTRICULAR STROKE VOLUME: 80 ML
MV E'TISSUE VEL-SEP: 7 CM/S
MV PEAK A VEL: 0.41 M/S
MV PEAK E VEL: 108 CM/S
MV STENOSIS PRESSURE HALF TIME: 0 MS
PA SYSTOLIC PRESSURE: 35 MMHG
RIGHT ATRIUM AREA SYSTOLE A4C: 20.7 CM2
RIGHT VENTRICLE ID DIMENSION: 3.8 CM
SL CV LV EF: 55
SL CV PED ECHO LEFT VENTRICLE DIASTOLIC VOLUME (MOD BIPLANE) 2D: 134 ML
SL CV PED ECHO LEFT VENTRICLE SYSTOLIC VOLUME (MOD BIPLANE) 2D: 54 ML
TRICUSPID VALVE PEAK REGURGITATION VELOCITY: 2.67 M/S
TRICUSPID VALVE S': 0.9 CM/S
TV PEAK GRADIENT: 29 MMHG
Z-SCORE OF LEFT VENTRICULAR DIMENSION IN END SYSTOLE: -8.65

## 2021-11-22 PROCEDURE — 93306 TTE W/DOPPLER COMPLETE: CPT

## 2021-11-22 PROCEDURE — 93306 TTE W/DOPPLER COMPLETE: CPT | Performed by: INTERNAL MEDICINE

## 2021-11-23 ENCOUNTER — OFFICE VISIT (OUTPATIENT)
Dept: OBGYN CLINIC | Facility: CLINIC | Age: 71
End: 2021-11-23
Payer: COMMERCIAL

## 2021-11-23 VITALS
HEIGHT: 71 IN | BODY MASS INDEX: 36.68 KG/M2 | DIASTOLIC BLOOD PRESSURE: 75 MMHG | WEIGHT: 262 LBS | SYSTOLIC BLOOD PRESSURE: 145 MMHG

## 2021-11-23 DIAGNOSIS — M17.12 PRIMARY OSTEOARTHRITIS OF LEFT KNEE: ICD-10-CM

## 2021-11-23 DIAGNOSIS — M17.11 PRIMARY OSTEOARTHRITIS OF RIGHT KNEE: Primary | ICD-10-CM

## 2021-11-23 LAB
INR PPP: 2.8 (ref 0.9–1.2)
PROTHROMBIN TIME: 28.3 SEC (ref 9.1–12)

## 2021-11-23 PROCEDURE — 99214 OFFICE O/P EST MOD 30 MIN: CPT | Performed by: ORTHOPAEDIC SURGERY

## 2021-11-23 PROCEDURE — 1036F TOBACCO NON-USER: CPT | Performed by: ORTHOPAEDIC SURGERY

## 2021-11-23 PROCEDURE — 3008F BODY MASS INDEX DOCD: CPT | Performed by: ORTHOPAEDIC SURGERY

## 2021-11-23 PROCEDURE — 1160F RVW MEDS BY RX/DR IN RCRD: CPT | Performed by: ORTHOPAEDIC SURGERY

## 2021-11-23 PROCEDURE — 20610 DRAIN/INJ JOINT/BURSA W/O US: CPT | Performed by: ORTHOPAEDIC SURGERY

## 2021-11-23 RX ORDER — LIDOCAINE HYDROCHLORIDE 10 MG/ML
7 INJECTION, SOLUTION INFILTRATION; PERINEURAL
Status: COMPLETED | OUTPATIENT
Start: 2021-11-23 | End: 2021-11-23

## 2021-11-23 RX ORDER — BETAMETHASONE SODIUM PHOSPHATE AND BETAMETHASONE ACETATE 3; 3 MG/ML; MG/ML
6 INJECTION, SUSPENSION INTRA-ARTICULAR; INTRALESIONAL; INTRAMUSCULAR; SOFT TISSUE
Status: COMPLETED | OUTPATIENT
Start: 2021-11-23 | End: 2021-11-23

## 2021-11-23 RX ADMIN — BETAMETHASONE SODIUM PHOSPHATE AND BETAMETHASONE ACETATE 6 MG: 3; 3 INJECTION, SUSPENSION INTRA-ARTICULAR; INTRALESIONAL; INTRAMUSCULAR; SOFT TISSUE at 13:54

## 2021-11-23 RX ADMIN — LIDOCAINE HYDROCHLORIDE 7 ML: 10 INJECTION, SOLUTION INFILTRATION; PERINEURAL at 13:54

## 2021-11-24 ENCOUNTER — ANTICOAG VISIT (OUTPATIENT)
Dept: FAMILY MEDICINE CLINIC | Facility: CLINIC | Age: 71
End: 2021-11-24

## 2021-12-08 ENCOUNTER — TELEPHONE (OUTPATIENT)
Dept: OBGYN CLINIC | Facility: HOSPITAL | Age: 71
End: 2021-12-08

## 2021-12-14 ENCOUNTER — OFFICE VISIT (OUTPATIENT)
Dept: OBGYN CLINIC | Facility: CLINIC | Age: 71
End: 2021-12-14
Payer: COMMERCIAL

## 2021-12-14 VITALS
HEIGHT: 71 IN | SYSTOLIC BLOOD PRESSURE: 128 MMHG | BODY MASS INDEX: 35.84 KG/M2 | WEIGHT: 256 LBS | DIASTOLIC BLOOD PRESSURE: 74 MMHG

## 2021-12-14 DIAGNOSIS — M17.12 PRIMARY OSTEOARTHRITIS OF LEFT KNEE: ICD-10-CM

## 2021-12-14 DIAGNOSIS — M17.11 PRIMARY OSTEOARTHRITIS OF RIGHT KNEE: Primary | ICD-10-CM

## 2021-12-14 DIAGNOSIS — M70.51 PES ANSERINUS BURSITIS OF RIGHT KNEE: ICD-10-CM

## 2021-12-14 PROCEDURE — 3008F BODY MASS INDEX DOCD: CPT | Performed by: ORTHOPAEDIC SURGERY

## 2021-12-14 PROCEDURE — 20610 DRAIN/INJ JOINT/BURSA W/O US: CPT | Performed by: ORTHOPAEDIC SURGERY

## 2021-12-14 PROCEDURE — 99213 OFFICE O/P EST LOW 20 MIN: CPT | Performed by: ORTHOPAEDIC SURGERY

## 2021-12-14 PROCEDURE — 3078F DIAST BP <80 MM HG: CPT | Performed by: ORTHOPAEDIC SURGERY

## 2021-12-14 PROCEDURE — 3074F SYST BP LT 130 MM HG: CPT | Performed by: ORTHOPAEDIC SURGERY

## 2021-12-14 PROCEDURE — 1036F TOBACCO NON-USER: CPT | Performed by: ORTHOPAEDIC SURGERY

## 2021-12-14 PROCEDURE — 1160F RVW MEDS BY RX/DR IN RCRD: CPT | Performed by: ORTHOPAEDIC SURGERY

## 2021-12-14 RX ORDER — LIDOCAINE HYDROCHLORIDE 10 MG/ML
0.5 INJECTION, SOLUTION INFILTRATION; PERINEURAL
Status: COMPLETED | OUTPATIENT
Start: 2021-12-14 | End: 2021-12-14

## 2021-12-14 RX ORDER — BETAMETHASONE SODIUM PHOSPHATE AND BETAMETHASONE ACETATE 3; 3 MG/ML; MG/ML
12 INJECTION, SUSPENSION INTRA-ARTICULAR; INTRALESIONAL; INTRAMUSCULAR; SOFT TISSUE
Status: COMPLETED | OUTPATIENT
Start: 2021-12-14 | End: 2021-12-14

## 2021-12-14 RX ADMIN — BETAMETHASONE SODIUM PHOSPHATE AND BETAMETHASONE ACETATE 12 MG: 3; 3 INJECTION, SUSPENSION INTRA-ARTICULAR; INTRALESIONAL; INTRAMUSCULAR; SOFT TISSUE at 14:40

## 2021-12-14 RX ADMIN — LIDOCAINE HYDROCHLORIDE 0.5 ML: 10 INJECTION, SOLUTION INFILTRATION; PERINEURAL at 13:36

## 2021-12-15 ENCOUNTER — ANTICOAG VISIT (OUTPATIENT)
Dept: FAMILY MEDICINE CLINIC | Facility: CLINIC | Age: 71
End: 2021-12-15

## 2021-12-15 DIAGNOSIS — Z79.01 ANTICOAGULATED ON COUMADIN: ICD-10-CM

## 2021-12-15 DIAGNOSIS — D68.51 FACTOR V LEIDEN MUTATION (HCC): Primary | ICD-10-CM

## 2021-12-15 LAB
INR PPP: 2.5 (ref 0.9–1.2)
PROTHROMBIN TIME: 25.2 SEC (ref 9.1–12)

## 2021-12-29 LAB
INR PPP: 2.5 (ref 0.9–1.2)
PROTHROMBIN TIME: 25.5 SEC (ref 9.1–12)

## 2021-12-30 ENCOUNTER — ANTICOAG VISIT (OUTPATIENT)
Dept: FAMILY MEDICINE CLINIC | Facility: CLINIC | Age: 71
End: 2021-12-30

## 2022-01-05 NOTE — TELEPHONE ENCOUNTER
Date: 2021 1:45 PM  Patient Name: Jyoti Goodwin  Account #: [id number]  Gender: Male   (age): 1939 (82)  Provider:    Mesfin Srinivasan. Liz Belcher MD     Referring Physician:    Jason Phan. [facility], [address] [place] [address]  [phone] (phone)  [phone] (fax)     Chief Complaint:    I'm not well again. History of Present Illness:  81-year-old male seen first by our practice in September of last year was weights of indigestion and belching. Upper GI endoscopy was performed And was normal.  Biopsies of the stomach and duodenum were normal.  Blood testing revealed positive results for alpha gal.  He avoided mammalian meat for several months and noted resolution of his problems. He took it upon himself to begin eating pork and did not note any symptoms then he resumed eating beef. Initially he found no problems with these foods. A repeat alpha gal IgE was performed September of this year, still abnormal but levels lower than previous. He has resumed avoidance of beef for the last few days but she needs to work periodically. He is of the mind that his symptoms are not related to his alpha gal allergy because the total IgE level is lower now than it was, but I advised him that the science in this particular food allergy is still in its infancy and in general patients with type IV hypersensitivity reactions to various antigen should avoid those antigens permanently, in my opinion. He could seek the advice of an allergist if he wants to determine if there is different advice there, from the experts in type IV hypersensitivity reactions. Ultrasound has been performed and is normal.  Biggest complaints are indigestion and belching not responsive to daily PPI and now addition of carafate. No bleeding, melena, vomiting, or dysphagia. He tried to stop nexium and symptoms worsened.       We negotiated repeat battery of complete blood allergy testing and repeat upper GI Patient may be a couple of min late - I advised the office [facility] #[id number])  Food Allergy Profile ([facility] #[id number])  Shen Casey MD  Christen Challenger, MRN [id number],  1939 Follow Up, 2021

## 2022-01-12 DIAGNOSIS — I48.91 ATRIAL FIBRILLATION, UNSPECIFIED TYPE (HCC): ICD-10-CM

## 2022-01-12 DIAGNOSIS — I10 ESSENTIAL HYPERTENSION: ICD-10-CM

## 2022-01-12 RX ORDER — METOPROLOL SUCCINATE 100 MG/1
TABLET, EXTENDED RELEASE ORAL
Qty: 90 TABLET | Refills: 0 | Status: ON HOLD | OUTPATIENT
Start: 2022-01-12 | End: 2022-03-23

## 2022-01-19 ENCOUNTER — ANTICOAG VISIT (OUTPATIENT)
Dept: FAMILY MEDICINE CLINIC | Facility: CLINIC | Age: 72
End: 2022-01-19

## 2022-01-19 DIAGNOSIS — G47.00 INSOMNIA, UNSPECIFIED TYPE: ICD-10-CM

## 2022-01-19 LAB
INR PPP: 2 (ref 0.9–1.2)
PROTHROMBIN TIME: 20.7 SEC (ref 9.1–12)

## 2022-01-19 RX ORDER — ZOLPIDEM TARTRATE 10 MG/1
10 TABLET ORAL
Qty: 90 TABLET | Refills: 1 | Status: SHIPPED | OUTPATIENT
Start: 2022-01-19 | End: 2022-03-29 | Stop reason: HOSPADM

## 2022-02-08 ENCOUNTER — ANTICOAG VISIT (OUTPATIENT)
Dept: FAMILY MEDICINE CLINIC | Facility: CLINIC | Age: 72
End: 2022-02-08

## 2022-02-08 ENCOUNTER — TELEPHONE (OUTPATIENT)
Dept: FAMILY MEDICINE CLINIC | Facility: CLINIC | Age: 72
End: 2022-02-08

## 2022-02-08 LAB
INR PPP: 2.8 (ref 0.9–1.2)
PROTHROMBIN TIME: 27.8 SEC (ref 9.1–12)

## 2022-02-08 NOTE — TELEPHONE ENCOUNTER
----- Message from Marco Antonio Moreno MD sent at 2/8/2022  8:14 AM EST -----  Regarding: PT/INR  INR 2 8-continue same   ----- Message -----  From: [facility] Results In  Sent: 12/29/2021   6:06 AM EST  To: Marco Antonio Moreno MD

## 2022-02-08 NOTE — TELEPHONE ENCOUNTER
Attempted to call pt- wireless customer not avail-  Pt to cont same and recheck 2 weeks  INR flow sheet already completed      Try to call patient again later,

## 2022-02-24 ENCOUNTER — OFFICE VISIT (OUTPATIENT)
Dept: OBGYN CLINIC | Facility: CLINIC | Age: 72
End: 2022-02-24
Payer: COMMERCIAL

## 2022-02-24 ENCOUNTER — TELEPHONE (OUTPATIENT)
Dept: CARDIOLOGY CLINIC | Facility: CLINIC | Age: 72
End: 2022-02-24

## 2022-02-24 VITALS
WEIGHT: 248 LBS | BODY MASS INDEX: 34.72 KG/M2 | HEART RATE: 56 BPM | SYSTOLIC BLOOD PRESSURE: 122 MMHG | HEIGHT: 71 IN | DIASTOLIC BLOOD PRESSURE: 70 MMHG | TEMPERATURE: 97.4 F

## 2022-02-24 DIAGNOSIS — Z01.818 PREOPERATIVE TESTING: ICD-10-CM

## 2022-02-24 DIAGNOSIS — M17.11 PRIMARY OSTEOARTHRITIS OF RIGHT KNEE: ICD-10-CM

## 2022-02-24 DIAGNOSIS — Z01.818 ENCOUNTER FOR PREADMISSION TESTING: Primary | ICD-10-CM

## 2022-02-24 DIAGNOSIS — M17.12 PRIMARY OSTEOARTHRITIS OF LEFT KNEE: ICD-10-CM

## 2022-02-24 PROCEDURE — 99214 OFFICE O/P EST MOD 30 MIN: CPT | Performed by: ORTHOPAEDIC SURGERY

## 2022-02-24 PROCEDURE — 1036F TOBACCO NON-USER: CPT | Performed by: ORTHOPAEDIC SURGERY

## 2022-02-24 PROCEDURE — 3008F BODY MASS INDEX DOCD: CPT | Performed by: ORTHOPAEDIC SURGERY

## 2022-02-24 PROCEDURE — 3078F DIAST BP <80 MM HG: CPT | Performed by: ORTHOPAEDIC SURGERY

## 2022-02-24 PROCEDURE — 1160F RVW MEDS BY RX/DR IN RCRD: CPT | Performed by: ORTHOPAEDIC SURGERY

## 2022-02-24 PROCEDURE — 3074F SYST BP LT 130 MM HG: CPT | Performed by: ORTHOPAEDIC SURGERY

## 2022-02-24 NOTE — PROGRESS NOTES
Assessment/Plan     1  Encounter for preadmission testing    2  Primary osteoarthritis of left knee    3  Primary osteoarthritis of right knee    4  Preoperative testing      Orders Placed This Encounter   Procedures    Rommel    FREIDA Davilaid Screening    XR knee 4+ vw left injury    XR knee 4+ vw right injury    XR bone length (scanogram)    Comprehensive metabolic panel    Hemoglobin A1C W/EAG Estimation    CBC and differential    C-reactive protein    Anemia Panel w/Reflex    Protime-INR    APTT    Amb[facility] to [facility] Ambulatory referral to Physical Therapy    Ambulatory referral to Hematology / Oncology    Ambulatory referral to Cardiology    Ambulatory Referral to Dermatology    Type and screen     Mr Juan Pablo Bragg has severe bilateral knee arthritis  We discussed treatment options as well as risks and benefits of treatment options  He would like to move forward with a RIGHT TKA  We discussed the process of surgery as well as the risks and benefits  The risks include but are not limited to infection, blood loss, blood clots, wound healing problems, persistent pain and stiffness, fracture, damage to blood vessels and nerves, failure of hardware, need for additional surgery or revision, heart attack, stroke, and death  The patient agreed by oral and written consent  His wife agreed as well  All questions were answered  Continue tylenol and voltaren gel prn pain  No NSAIDs due to being on coumadin  Will need to be seen by derm to resolve bilateral lower extremity rashes  Bring CPAP to hospital and know settings  Of note it is difficult to get blood from him and place an iv  He will need clearance from PCP, hematology, cardiology, and dermatology  Will plan on bridging coumadin with lovenox after surgery  Will leave it up to hematology as to whether it is needed prior to surgery  Will plan on doing L TKA after his vacation in July  Return for appt after surgery      I answered all of the patient's questions during the visit and provided education of the patient's condition during the visit  The patient verbalized understanding of the information given and agrees with the plan  This note was dictated using [employer] software  It may contain errors including improperly dictated words  Please contact physician directly for any questions  History of Present Illness   Chief complaint:   Chief Complaint   Patient presents with    Left Knee - Pain    Right Knee - Pain       HPI: Kev Butt is a 71 y o  male that c/o bilateral knee pain  He has had pain x years  His right knee pain is generalized but mostly over the anterolateral aspect of his knee  His Left knee is generalized but mostly over the anteromedial aspect of his knee  His pain is intermittent and occurs with movement  He has had falls due to knee pain and feels limited by his pain  Uneven surfaces and stairs are especially difficult  He takes tylenol and voltaren gel prn which help some  His is unable to take NSAIDs due to being on coumadin  He has a hx of factor V leiden and blood clots  He has seen Dr Teresa Tran and Dr Ericka Aquino in the past   He has tried steroid injections and viscosupplementation without recent success  He was planning on TKA surgery a year ago but his wife was diagnosed with idiopathic pulmonary fibrosis and he decided to wait  He would now like to consider surgery  History of MRSA: no  History of blood clots: yes  Family history of blood clots: no  History of Hepatitis C:no  History of HIV: no  Are you a smoker:no  Are you diabetic:no  Do you see a cardiologist: yes  Have you been vaccinated for COVID:yes  Have you seen a dentist in the past year: yes      ROS:    See HPI for musculoskeletal review     All other systems reviewed are negative     Historical Information   Past Medical History:   Diagnosis Date    Acute venous embolism and thrombosis of deep vessels of proximal lower extremity ([address] )     Last assessed: 5/18/15    Arthritis     Cellulitis     LE    CPAP (continuous positive airway pressure) dependence     Factor V Leiden (HCC)     Forgetfulness     [place] (hard of hearing)     Paroxysmal atrial fibrillation (HCC)     Last assessed: 11/2/15    Sleep apnea      Past Surgical History:   Procedure Laterality Date    BACK SURGERY      Lower    COLON SURGERY      COLONOSCOPY       Social History   Social History     Substance and Sexual Activity   Alcohol Use Not Currently     Social History     Substance and Sexual Activity   Drug Use No     Social History     Tobacco Use   Smoking Status Never Smoker   Smokeless Tobacco Never Used     Family History:   Family History   Problem Relation Age of Onset    Lung cancer Mother     No Known Problems Father     Heart attack Brother     Atrial fibrillation Brother     Emphysema Sister        No current facility-administered medications on file prior to visit       Current Outpatient Medications on File Prior to Visit   Medication Sig Dispense Refill    celecoxib (CeleBREX) 200 mg capsule TAKE 1 CAPSULE BY MOUTH  DAILY 90 capsule 1    [facility] Vitamin C 500 MG tablet TAKE 1 TABLET BY MOUTH 2 TIMES A  tablet 0    Diclofenac Sodium (VOLTAREN EX) Apply topically        flecainide (TAMBOCOR) 100 mg tablet TAKE 1 TABLET BY MOUTH  TWICE DAILY 180 tablet 1    metoprolol succinate (TOPROL-XL) 100 mg 24 hr tablet TAKE 1 TABLET BY MOUTH  DAILY 90 tablet 0    pravastatin (PRAVACHOL) 40 mg tablet TAKE 1 TABLET BY MOUTH  DAILY 90 tablet 1    tamsulosin (FLOMAX) 0 4 mg TAKE 1 CAPSULE BY MOUTH  DAILY WITH DINNER 90 capsule 1    warfarin (COUMADIN) 5 mg tablet TAKE 1 TABLET BY MOUTH ON  WEDNESDAY AND SATURDAY, AND 1/2 TABLET THE REST OF THE  WEEK OR AS DIRECTED BY  PT/INR 90 tablet 1    zolpidem (AMBIEN) 10 mg tablet Take 1 tablet (10 mg total) by mouth daily at bedtime as needed for sleep 90 tablet 1    DULoxetine (CYMBALTA) 60 mg delayed release capsule TAKE 1 CAPSULE BY MOUTH  DAILY 90 capsule 1     Allergies   Allergen Reactions    Morphine GI Intolerance    Vitamin K Other (See Comments)     On coumadin-patient sensitive to vitamin K amounts in meds etc        No current facility-administered medications on file prior to visit  Current Outpatient Medications on File Prior to Visit   Medication Sig Dispense Refill    celecoxib (CeleBREX) 200 mg capsule TAKE 1 CAPSULE BY MOUTH  DAILY 90 capsule 1    [facility] Vitamin C 500 MG tablet TAKE 1 TABLET BY MOUTH 2 TIMES A  tablet 0    Diclofenac Sodium (VOLTAREN EX) Apply topically        flecainide (TAMBOCOR) 100 mg tablet TAKE 1 TABLET BY MOUTH  TWICE DAILY 180 tablet 1    metoprolol succinate (TOPROL-XL) 100 mg 24 hr tablet TAKE 1 TABLET BY MOUTH  DAILY 90 tablet 0    pravastatin (PRAVACHOL) 40 mg tablet TAKE 1 TABLET BY MOUTH  DAILY 90 tablet 1    tamsulosin (FLOMAX) 0 4 mg TAKE 1 CAPSULE BY MOUTH  DAILY WITH DINNER 90 capsule 1    warfarin (COUMADIN) 5 mg tablet TAKE 1 TABLET BY MOUTH ON  WEDNESDAY AND SATURDAY, AND 1/2 TABLET THE REST OF THE  WEEK OR AS DIRECTED BY  PT/INR 90 tablet 1    zolpidem (AMBIEN) 10 mg tablet Take 1 tablet (10 mg total) by mouth daily at bedtime as needed for sleep 90 tablet 1    DULoxetine (CYMBALTA) 60 mg delayed release capsule TAKE 1 CAPSULE BY MOUTH  DAILY 90 capsule 1       Objective   Vitals: Blood pressure 122/70, pulse 56, temperature (!) 97 4 °F (36 3 °C), height 5' 11" (1 803 m), weight 112 kg (248 lb)  ,Body mass index is 34 59 kg/m²      PE:  AAOx 3  WDWN  Hearing intact, no drainage from eyes  Regular rate  no audible wheezing  no abdominal distension  LE compartments soft, skin intact    B/L knee:    Appearance:  Mild generalized swelling   No ecchymosis  no obvious joint deformity   No effusion  Palpation/Tenderness:  No TTP over medial joint line  No TTP over lateral joint line   No TTP over patella  No TTP over patellar tendon  No TTP over pes anserine bursa  Active Range of Motion:  AROM: 0-120  Special Tests:  Valgus Stress Test:  negative  Varus Stress Test:  negative   No B/L hip pain with ROM      bilateralLE:    AT/GS intact    Imaging Studies: I have personally reviewed pertinent films in PACS  XR bilateralknee:  Severe DJD with osteophytes and joint space narrowing  R knee valgus deformity, L knee varus deformity

## 2022-02-24 NOTE — TELEPHONE ENCOUNTER
Ortho requesting clearance for Right TKR on 5/3/22  Pt's last OV was November  He takes Coumadin       Letter prepped and routed for your approval

## 2022-02-25 ENCOUNTER — TELEPHONE (OUTPATIENT)
Dept: FAMILY MEDICINE CLINIC | Facility: CLINIC | Age: 72
End: 2022-02-25

## 2022-02-25 ENCOUNTER — ANTICOAG VISIT (OUTPATIENT)
Dept: FAMILY MEDICINE CLINIC | Facility: CLINIC | Age: 72
End: 2022-02-25

## 2022-02-25 DIAGNOSIS — L03.119 CELLULITIS OF LOWER EXTREMITY, UNSPECIFIED LATERALITY: Primary | ICD-10-CM

## 2022-02-25 LAB
INR PPP: 1.8 (ref 0.9–1.2)
PROTHROMBIN TIME: 18 SEC (ref 9.1–12)

## 2022-02-25 RX ORDER — CEPHALEXIN 500 MG/1
500 CAPSULE ORAL EVERY 8 HOURS SCHEDULED
Qty: 30 CAPSULE | Refills: 0 | Status: SHIPPED | OUTPATIENT
Start: 2022-02-25 | End: 2022-03-07

## 2022-02-25 RX ORDER — CEPHALEXIN 500 MG/1
500 CAPSULE ORAL EVERY 8 HOURS SCHEDULED
Qty: 30 CAPSULE | Refills: 0 | Status: SHIPPED | OUTPATIENT
Start: 2022-02-25 | End: 2022-02-25 | Stop reason: SDUPTHER

## 2022-02-25 NOTE — TELEPHONE ENCOUNTER
Letter completed - route to Dr Mary Cruz  Ortho - please note Cardiology does not manage this pt's Coumadin  It appears to be managed by his PCP  Please also contact their Coumadin Clinic, thank you

## 2022-02-25 NOTE — TELEPHONE ENCOUNTER
Pt aware of INR-and to continue same dose    Pt states he has a rash on leg, pt states that the surgeon that they went to for his leg stated for him to contact PCP and possibly get antibiotic as rash looks like cellulitis again-pt had it last year  pt has appt on Thursday but would like to get antibiotic         [facility] [phone]

## 2022-02-25 NOTE — TELEPHONE ENCOUNTER
----- Message from Massimo Staples MD sent at 2/25/2022  9:28 AM EST -----  Call patient with lab result-same

## 2022-02-25 NOTE — TELEPHONE ENCOUNTER
It is fine for him to hold Coumadin from a cardiac standpoint  However he also has a hypercoagulable disorder and had been followed by Hematology  He would have to contact them to see if bridging would be needed with Lovenox, from a cardiac standpoint he does not need bridging  Thank you

## 2022-03-03 ENCOUNTER — TELEPHONE (OUTPATIENT)
Dept: NEUROLOGY | Facility: CLINIC | Age: 72
End: 2022-03-03

## 2022-03-03 NOTE — TELEPHONE ENCOUNTER
Patient was able to move his appointment, but could not take 3/4/22  He rescheduled to 6/15/22 @ 10:30am w/Dr Jean Paul Okeefe  This was not a patient request, the office was working to get an urgent patient in on 3/18/22, and this patient was kind enough to help us accommodate another patient

## 2022-03-03 NOTE — TELEPHONE ENCOUNTER
Called and spoke w/patient  Offered a sooner appointment 3/4/22 @ 9am w/Dr Jose Guadalupe Camargo in [facility]  Patient needs to speak w/his spouse first  He will call us back either way  The spot is on hold for him

## 2022-03-04 ENCOUNTER — HOSPITAL ENCOUNTER (INPATIENT)
Facility: HOSPITAL | Age: 72
LOS: 1 days | DRG: 093 | End: 2022-03-05
Attending: EMERGENCY MEDICINE | Admitting: INTERNAL MEDICINE
Payer: COMMERCIAL

## 2022-03-04 ENCOUNTER — APPOINTMENT (INPATIENT)
Dept: CT IMAGING | Facility: HOSPITAL | Age: 72
DRG: 093 | End: 2022-03-04
Payer: COMMERCIAL

## 2022-03-04 ENCOUNTER — APPOINTMENT (EMERGENCY)
Dept: RADIOLOGY | Facility: HOSPITAL | Age: 72
DRG: 093 | End: 2022-03-04
Payer: COMMERCIAL

## 2022-03-04 ENCOUNTER — APPOINTMENT (INPATIENT)
Dept: NON INVASIVE DIAGNOSTICS | Facility: HOSPITAL | Age: 72
DRG: 093 | End: 2022-03-04
Payer: COMMERCIAL

## 2022-03-04 ENCOUNTER — APPOINTMENT (EMERGENCY)
Dept: CT IMAGING | Facility: HOSPITAL | Age: 72
DRG: 093 | End: 2022-03-04
Payer: COMMERCIAL

## 2022-03-04 DIAGNOSIS — R29.898 LEG WEAKNESS: ICD-10-CM

## 2022-03-04 DIAGNOSIS — E78.5 DYSLIPIDEMIA: ICD-10-CM

## 2022-03-04 DIAGNOSIS — I10 ESSENTIAL HYPERTENSION: ICD-10-CM

## 2022-03-04 DIAGNOSIS — R26.2 AMBULATORY DYSFUNCTION: Primary | ICD-10-CM

## 2022-03-04 PROBLEM — R53.1 WEAKNESS: Status: ACTIVE | Noted: 2022-03-04

## 2022-03-04 LAB
ALBUMIN SERPL BCP-MCNC: 4 G/DL (ref 3.5–5)
ALP SERPL-CCNC: 69 U/L (ref 46–116)
ALT SERPL W P-5'-P-CCNC: 24 U/L (ref 12–78)
ANION GAP SERPL CALCULATED.3IONS-SCNC: 4 MMOL/L (ref 4–13)
APTT PPP: 33 SECONDS (ref 23–37)
AST SERPL W P-5'-P-CCNC: 20 U/L (ref 5–45)
ATRIAL RATE: 59 BPM
BASOPHILS # BLD AUTO: 0.04 THOUSANDS/ΜL (ref 0–0.1)
BASOPHILS NFR BLD AUTO: 1 % (ref 0–1)
BILIRUB SERPL-MCNC: 0.8 MG/DL (ref 0.2–1)
BUN SERPL-MCNC: 11 MG/DL (ref 5–25)
CALCIUM SERPL-MCNC: 9.2 MG/DL (ref 8.3–10.1)
CARDIAC TROPONIN I PNL SERPL HS: 3 NG/L
CHLORIDE SERPL-SCNC: 101 MMOL/L (ref 100–108)
CO2 SERPL-SCNC: 31 MMOL/L (ref 21–32)
CREAT SERPL-MCNC: 0.76 MG/DL (ref 0.6–1.3)
EOSINOPHIL # BLD AUTO: 0.07 THOUSAND/ΜL (ref 0–0.61)
EOSINOPHIL NFR BLD AUTO: 1 % (ref 0–6)
ERYTHROCYTE [DISTWIDTH] IN BLOOD BY AUTOMATED COUNT: 12.4 % (ref 11.6–15.1)
GFR SERPL CREATININE-BSD FRML MDRD: 91 ML/MIN/1.73SQ M
GLUCOSE SERPL-MCNC: 84 MG/DL (ref 65–140)
GLUCOSE SERPL-MCNC: 98 MG/DL (ref 65–140)
HCT VFR BLD AUTO: 42.9 % (ref 36.5–49.3)
HGB BLD-MCNC: 14.1 G/DL (ref 12–17)
IMM GRANULOCYTES # BLD AUTO: 0.01 THOUSAND/UL (ref 0–0.2)
IMM GRANULOCYTES NFR BLD AUTO: 0 % (ref 0–2)
INR PPP: 2.02 (ref 0.84–1.19)
LYMPHOCYTES # BLD AUTO: 1.77 THOUSANDS/ΜL (ref 0.6–4.47)
LYMPHOCYTES NFR BLD AUTO: 34 % (ref 14–44)
MCH RBC QN AUTO: 29.7 PG (ref 26.8–34.3)
MCHC RBC AUTO-ENTMCNC: 32.9 G/DL (ref 31.4–37.4)
MCV RBC AUTO: 90 FL (ref 82–98)
MONOCYTES # BLD AUTO: 0.51 THOUSAND/ΜL (ref 0.17–1.22)
MONOCYTES NFR BLD AUTO: 10 % (ref 4–12)
NEUTROPHILS # BLD AUTO: 2.82 THOUSANDS/ΜL (ref 1.85–7.62)
NEUTS SEG NFR BLD AUTO: 54 % (ref 43–75)
NRBC BLD AUTO-RTO: 0 /100 WBCS
NT-PROBNP SERPL-MCNC: 121 PG/ML
P AXIS: 78 DEGREES
PLATELET # BLD AUTO: 221 THOUSANDS/UL (ref 149–390)
PMV BLD AUTO: 9.8 FL (ref 8.9–12.7)
POTASSIUM SERPL-SCNC: 4.2 MMOL/L (ref 3.5–5.3)
PR INTERVAL: 214 MS
PROT SERPL-MCNC: 7.8 G/DL (ref 6.4–8.2)
PROTHROMBIN TIME: 22.4 SECONDS (ref 11.6–14.5)
QRS AXIS: 102 DEGREES
QRSD INTERVAL: 170 MS
QT INTERVAL: 522 MS
QTC INTERVAL: 516 MS
RBC # BLD AUTO: 4.75 MILLION/UL (ref 3.88–5.62)
SODIUM SERPL-SCNC: 136 MMOL/L (ref 136–145)
T WAVE AXIS: 26 DEGREES
VENTRICULAR RATE: 59 BPM
WBC # BLD AUTO: 5.22 THOUSAND/UL (ref 4.31–10.16)

## 2022-03-04 PROCEDURE — 93005 ELECTROCARDIOGRAM TRACING: CPT

## 2022-03-04 PROCEDURE — 93010 ELECTROCARDIOGRAM REPORT: CPT | Performed by: INTERNAL MEDICINE

## 2022-03-04 PROCEDURE — 99223 1ST HOSP IP/OBS HIGH 75: CPT | Performed by: INTERNAL MEDICINE

## 2022-03-04 PROCEDURE — G1004 CDSM NDSC: HCPCS

## 2022-03-04 PROCEDURE — 85730 THROMBOPLASTIN TIME PARTIAL: CPT | Performed by: EMERGENCY MEDICINE

## 2022-03-04 PROCEDURE — 36415 COLL VENOUS BLD VENIPUNCTURE: CPT | Performed by: EMERGENCY MEDICINE

## 2022-03-04 PROCEDURE — 71045 X-RAY EXAM CHEST 1 VIEW: CPT

## 2022-03-04 PROCEDURE — 70498 CT ANGIOGRAPHY NECK: CPT

## 2022-03-04 PROCEDURE — 85610 PROTHROMBIN TIME: CPT | Performed by: EMERGENCY MEDICINE

## 2022-03-04 PROCEDURE — 70496 CT ANGIOGRAPHY HEAD: CPT

## 2022-03-04 PROCEDURE — 80053 COMPREHEN METABOLIC PANEL: CPT | Performed by: EMERGENCY MEDICINE

## 2022-03-04 PROCEDURE — 83880 ASSAY OF NATRIURETIC PEPTIDE: CPT | Performed by: EMERGENCY MEDICINE

## 2022-03-04 PROCEDURE — 85025 COMPLETE CBC W/AUTO DIFF WBC: CPT | Performed by: EMERGENCY MEDICINE

## 2022-03-04 PROCEDURE — 82948 REAGENT STRIP/BLOOD GLUCOSE: CPT

## 2022-03-04 PROCEDURE — 99285 EMERGENCY DEPT VISIT HI MDM: CPT | Performed by: EMERGENCY MEDICINE

## 2022-03-04 PROCEDURE — 84484 ASSAY OF TROPONIN QUANT: CPT | Performed by: EMERGENCY MEDICINE

## 2022-03-04 PROCEDURE — 93308 TTE F-UP OR LMTD: CPT

## 2022-03-04 PROCEDURE — 99285 EMERGENCY DEPT VISIT HI MDM: CPT

## 2022-03-04 RX ORDER — DULOXETIN HYDROCHLORIDE 30 MG/1
60 CAPSULE, DELAYED RELEASE ORAL
Status: DISCONTINUED | OUTPATIENT
Start: 2022-03-04 | End: 2022-03-05 | Stop reason: HOSPADM

## 2022-03-04 RX ORDER — CELECOXIB 100 MG/1
200 CAPSULE ORAL DAILY
Status: DISCONTINUED | OUTPATIENT
Start: 2022-03-04 | End: 2022-03-05 | Stop reason: HOSPADM

## 2022-03-04 RX ORDER — FLECAINIDE ACETATE 100 MG/1
100 TABLET ORAL 2 TIMES DAILY
Status: DISCONTINUED | OUTPATIENT
Start: 2022-03-04 | End: 2022-03-05 | Stop reason: HOSPADM

## 2022-03-04 RX ORDER — WARFARIN SODIUM 5 MG/1
5 TABLET ORAL
Status: DISCONTINUED | OUTPATIENT
Start: 2022-03-05 | End: 2022-03-05

## 2022-03-04 RX ORDER — WARFARIN SODIUM 2.5 MG/1
2.5 TABLET ORAL
Status: DISCONTINUED | OUTPATIENT
Start: 2022-03-04 | End: 2022-03-05

## 2022-03-04 RX ORDER — CEPHALEXIN 250 MG/1
500 CAPSULE ORAL EVERY 8 HOURS SCHEDULED
Status: DISCONTINUED | OUTPATIENT
Start: 2022-03-04 | End: 2022-03-05 | Stop reason: HOSPADM

## 2022-03-04 RX ORDER — ACETAMINOPHEN 325 MG/1
650 TABLET ORAL EVERY 4 HOURS PRN
Status: DISCONTINUED | OUTPATIENT
Start: 2022-03-04 | End: 2022-03-05 | Stop reason: HOSPADM

## 2022-03-04 RX ORDER — TAMSULOSIN HYDROCHLORIDE 0.4 MG/1
0.4 CAPSULE ORAL
Status: DISCONTINUED | OUTPATIENT
Start: 2022-03-04 | End: 2022-03-05 | Stop reason: HOSPADM

## 2022-03-04 RX ORDER — ATORVASTATIN CALCIUM 40 MG/1
40 TABLET, FILM COATED ORAL EVERY EVENING
Status: DISCONTINUED | OUTPATIENT
Start: 2022-03-04 | End: 2022-03-05 | Stop reason: HOSPADM

## 2022-03-04 RX ADMIN — CELECOXIB 200 MG: 100 CAPSULE ORAL at 21:49

## 2022-03-04 RX ADMIN — ACETAMINOPHEN 650 MG: 325 TABLET, FILM COATED ORAL at 20:19

## 2022-03-04 RX ADMIN — DULOXETINE 60 MG: 30 CAPSULE, DELAYED RELEASE ORAL at 21:49

## 2022-03-04 RX ADMIN — CEPHALEXIN 500 MG: 250 CAPSULE ORAL at 21:50

## 2022-03-04 RX ADMIN — TAMSULOSIN HYDROCHLORIDE 0.4 MG: 0.4 CAPSULE ORAL at 20:19

## 2022-03-04 RX ADMIN — WARFARIN SODIUM 2.5 MG: 2.5 TABLET ORAL at 21:50

## 2022-03-04 RX ADMIN — FLECAINIDE ACETATE 100 MG: 100 TABLET ORAL at 20:19

## 2022-03-04 RX ADMIN — ATORVASTATIN CALCIUM 40 MG: 40 TABLET, FILM COATED ORAL at 20:19

## 2022-03-04 RX ADMIN — IOHEXOL 85 ML: 350 INJECTION, SOLUTION INTRAVENOUS at 16:47

## 2022-03-04 NOTE — ASSESSMENT & PLAN NOTE
· Patient follows with cardiology as outpatient  · Currently in NSR  · Rate controlled on metoprolol/flecainide  · Anticoagulated on Coumadin, INR therapeutic 2 02

## 2022-03-04 NOTE — ASSESSMENT & PLAN NOTE
· Continue CPAP q h s  normal... Well appearing, awake, alert, oriented to person, place, time/situation and in no apparent distress.

## 2022-03-04 NOTE — ASSESSMENT & PLAN NOTE
· On pravastatin 40 mg daily at home  · Transition to Lipitor 40 mg daily in setting of stroke pathway

## 2022-03-04 NOTE — ED PROVIDER NOTES
History  Chief Complaint   Patient presents with    Extremity Weakness     dayne presents to ED with bilateral leg weakness  patient states she is scheudled to have both knees replaced and was given a cane for ambulating   Neck Pain     patient also complaining of right sided neck stiffness and pain that causing issues with right shoulder not being able to be raised and slight numbness to right hand      71year old male presents for evaluation of bilateral leg weakness, worse on the right and decreased right hand  for the past 3 weeks  For the past month, he has had intermittent right-sided neck pains associated with decreased sensation over the 4th and 5th digits of the right hand  Patient denies headache or dizziness, but feels unsteady and has had multiple falls during which he has either fallen onto his bottom or onto his hands and knees  He denies head strike  Patient has history of HLD, paroxysmal Afib and factor V Leiden currently anticoagulated with coumadin  No recent medication changes  Patient has had chronic bilateral knee pain and states he is being evaluated for knee replacements; however, his weakness in his legs has been in excess of what he has had with the knee pain alone  He had been provided with a cane at his ortho appointment 1 week ago which he had not needed previously for ambulation  He denies back pain  No fevers or chills  No nausea, vomiting, visual changes or difficulty speaking  No history of prior stroke  History provided by:  Patient  CVA/TIA-like Symptoms  Presenting symptoms: loss of balance, sensory loss and weakness    Presenting symptoms: no headaches    Sensory loss:     Focal sensory loss location: right ulnar distribution      Severity:  Mild  Weakness:     Severity:  Moderate    Onset quality:  Unable to specify    Duration:  3 weeks    Timing:  Constant    Progression:  Unchanged    Chronicity:  New  Onset quality:  Unable to specify  Duration: 3 weeks  Timing:  Constant  Progression:  Unchanged  Associated symptoms: neck pain (intermittent right lateral neck)    Associated symptoms: no chest pain, no dizziness and no fever        Prior to Admission Medications   Prescriptions Last Dose Informant Patient Reported? Taking?    [facility] Vitamin C 500 MG tablet 3/4/2022 at Unknown time Self No Yes   Sig: TAKE 1 TABLET BY MOUTH 2 TIMES A DAY   DULoxetine (CYMBALTA) 60 mg delayed release capsule 3/3/2022 at Unknown time Self No Yes   Sig: TAKE 1 CAPSULE BY MOUTH  DAILY   Diclofenac Sodium (VOLTAREN EX) 3/3/2022 at Unknown time Self Yes Yes   Sig: Apply topically     celecoxib (CeleBREX) 200 mg capsule 3/3/2022 at Unknown time Self No Yes   Sig: TAKE 1 CAPSULE BY MOUTH  DAILY   cephalexin (KEFLEX) 500 mg capsule 3/4/2022 at Unknown time  No Yes   Sig: Take 1 capsule (500 mg total) by mouth every 8 (eight) hours for 10 days   flecainide (TAMBOCOR) 100 mg tablet 3/4/2022 at Unknown time Self No Yes   Sig: TAKE 1 TABLET BY MOUTH  TWICE DAILY   metoprolol succinate (TOPROL-XL) 100 mg 24 hr tablet 3/3/2022 at Unknown time  No Yes   Sig: TAKE 1 TABLET BY MOUTH  DAILY   pravastatin (PRAVACHOL) 40 mg tablet 3/3/2022 at Unknown time Self No Yes   Sig: TAKE 1 TABLET BY MOUTH  DAILY   tamsulosin (FLOMAX) 0 4 mg 3/3/2022 at Unknown time Self No Yes   Sig: TAKE 1 CAPSULE BY MOUTH  DAILY WITH DINNER   warfarin (COUMADIN) 5 mg tablet 3/3/2022 at Unknown time Self No Yes   Sig: TAKE 1 TABLET BY MOUTH ON  WEDNESDAY AND SATURDAY, AND 1/2 TABLET THE REST OF THE  WEEK OR AS DIRECTED BY  PT/INR   zolpidem (AMBIEN) 10 mg tablet Past Week at Unknown time  No Yes   Sig: Take 1 tablet (10 mg total) by mouth daily at bedtime as needed for sleep      Facility-Administered Medications Last Administration Doses Remaining   bupivacaine (MARCAINE) 0 25 % injection 4 mL 12/3/2020 10:03 AM    methylPREDNISolone acetate (DEPO-MEDROL) injection 1 mL 12/3/2020 10:03 AM           Past Medical History: Diagnosis Date    Acute venous embolism and thrombosis of deep vessels of proximal lower extremity (HCC)     Last assessed: 5/18/15    Arthritis     Cellulitis     LE    CPAP (continuous positive airway pressure) dependence     Factor V Leiden ([address] )     Forgetfulness     [place] hearing[place]     Paroxysmal atrial fibrillation (HCC)     Last assessed: 11/2/15    Sleep apnea        Past Surgical History:   Procedure Laterality Date    BACK SURGERY      Lower    COLON SURGERY      COLONOSCOPY         Family History   Problem Relation Age of Onset    Lung cancer Mother     No Known Problems Father     Heart attack Brother     Atrial fibrillation Brother     Emphysema Sister      I have reviewed and agree with the history as documented  E-Cigarette/Vaping    E-Cigarette Use Never User      E-Cigarette/Vaping Substances     Social History     Tobacco Use    Smoking status: Never Smoker    Smokeless tobacco: Never Used   Vaping Use    Vaping Use: Never used   Substance Use Topics    Alcohol use: Not Currently    Drug use: No       Review of Systems   Constitutional: Negative for chills and fever  HENT: Negative for congestion  Respiratory: Negative for cough and shortness of breath  Cardiovascular: Negative for chest pain  Gastrointestinal: Negative for abdominal pain  Musculoskeletal: Positive for neck pain (intermittent right lateral neck)  Negative for joint swelling  Skin: Positive for rash (bilateral lower legs, improving)  Negative for wound  Neurological: Positive for weakness and loss of balance  Negative for dizziness, syncope, light-headedness and headaches  All other systems reviewed and are negative  Physical Exam  Physical Exam  Vitals and nursing note reviewed  Constitutional:       General: He is not in acute distress  Appearance: He is well-developed  He is not toxic-appearing or diaphoretic  HENT:      Head: Normocephalic and atraumatic  Right Ear: External ear normal       Left Ear: External ear normal       Nose: Nose normal    Eyes:      General: No scleral icterus  Extraocular Movements: Extraocular movements intact  Pupils: Pupils are equal, round, and reactive to light  Cardiovascular:      Rate and Rhythm: Normal rate and regular rhythm  Heart sounds: Normal heart sounds  Pulmonary:      Effort: Pulmonary effort is normal  No respiratory distress  Abdominal:      General: There is no distension  Musculoskeletal:         General: No deformity  Normal range of motion  Skin:     General: Skin is warm and dry  Findings: No rash  Neurological:      General: No focal deficit present  Mental Status: He is alert and oriented to person, place, and time  GCS: GCS eye subscore is 4  GCS verbal subscore is 5  GCS motor subscore is 6  Cranial Nerves: Cranial nerves are intact  Sensory: Sensory deficit (slight decrease in sensation right ulnar distribution) present  Motor: Weakness (4/5 strength right hip flexor with drift, 5/5 left hip flexor, 5/5 bilateral upper extremities without drift) present  No abnormal muscle tone  Coordination: Finger-Nose-Finger Test and Heel to [place] luz maria Test normal       Gait: Gait abnormal (unsteady)     Psychiatric:         Mood and Affect: Mood normal          Vital Signs  ED Triage Vitals   Temperature Pulse Respirations Blood Pressure SpO2   03/04/22 1444 03/04/22 1444 03/04/22 1444 03/04/22 1444 03/04/22 1444   97 8 °F (36 6 °C) 59 16 123/76 98 %      Temp src Heart Rate Source Patient Position - Orthostatic VS BP Location FiO2 (%)   -- 03/04/22 1444 03/04/22 1444 03/04/22 1444 --    Monitor Sitting Left arm       Pain Score       03/04/22 1839       5           Vitals:    03/04/22 1730 03/04/22 1801 03/04/22 1912 03/04/22 2203   BP: 156/70 156/70 166/86 141/68   Pulse: (!) 51 (!) 54 (!) 53 (!) 54   Patient Position - Orthostatic VS: Lying            Visual Acuity  Visual Acuity      Most Recent Value   L Pupil Size (mm) 3   R Pupil Size (mm) 3   L Pupil Shape Round   R Pupil Shape Round          ED Medications  Medications   celecoxib (CeleBREX) capsule 200 mg (200 mg Oral Given 3/4/22 2149)   cephalexin (KEFLEX) capsule 500 mg (500 mg Oral Given 3/5/22 0613)   flecainide (TAMBOCOR) tablet 100 mg (100 mg Oral Given 3/4/22 2019)   tamsulosin (FLOMAX) capsule 0 4 mg (0 4 mg Oral Given 3/4/22 2019)   warfarin (COUMADIN) tablet 5 mg (has no administration in time range)   warfarin (COUMADIN) tablet 2 5 mg (2 5 mg Oral Given 3/4/22 2150)   acetaminophen (TYLENOL) tablet 650 mg (650 mg Oral Given 3/4/22 2019)   atorvastatin (LIPITOR) tablet 40 mg (40 mg Oral Given 3/4/22 2019)   DULoxetine (CYMBALTA) delayed release capsule 60 mg (60 mg Oral Given 3/4/22 2149)   iohexol (OMNIPAQUE) 350 MG/ML injection (SINGLE-DOSE) 100 mL (85 mL Intravenous Given 3/4/22 1647)       Diagnostic Studies  Results Reviewed     Procedure Component Value Units Date/Time    UA w Reflex to Microscopic w Reflex to Culture [[id number]]     Lab Status: No result Specimen: Urine     CBC and differential [[id number]] Collected: 03/04/22 1454    Lab Status: Final result Specimen: Blood from Arm, Right Updated: 03/04/22 1602     WBC 5 22 Thousand/uL      RBC 4 75 Million/uL      Hemoglobin 14 1 g/dL      Hematocrit 42 9 %      MCV 90 fL      MCH 29 7 pg      MCHC 32 9 g/dL      RDW 12 4 %      MPV 9 8 fL      Platelets 060 Thousands/uL      nRBC 0 /100 WBCs      Neutrophils Relative 54 %      Immat GRANS % 0 %      Lymphocytes Relative 34 %      Monocytes Relative 10 %      Eosinophils Relative 1 %      Basophils Relative 1 %      Neutrophils Absolute 2 82 Thousands/µL      Immature Grans Absolute 0 01 Thousand/uL      Lymphocytes Absolute 1 77 Thousands/µL      Monocytes Absolute 0 51 Thousand/µL      Eosinophils Absolute 0 07 Thousand/µL      Basophils Absolute 0 04 Thousands/µL     APTT [[id number]] (Normal) Collected: 03/04/22 1454    Lab Status: Final result Specimen: Blood from Arm, Right Updated: 03/04/22 1543     PTT 33 seconds     Protime-INR [[id number]]  (Abnormal) Collected: 03/04/22 1454    Lab Status: Final result Specimen: Blood from Arm, Right Updated: 03/04/22 1543     Protime 22 4 seconds      INR 2 02    NT-BNP PRO [[id number]]  (Normal) Collected: 03/04/22 1454    Lab Status: Final result Specimen: Blood from Arm, Right Updated: 03/04/22 1543     NT-proBNP 121 pg/mL     HS Troponin 0hr (reflex protocol) [[id number]]  (Normal) Collected: 03/04/22 1454    Lab Status: Final result Specimen: Blood from Arm, Right Updated: 03/04/22 1540     hs TnI 0hr 3 ng/L     Comprehensive metabolic panel [[id number]] Collected: 03/04/22 1454    Lab Status: Final result Specimen: Blood from Arm, Right Updated: 03/04/22 1534     Sodium 136 mmol/L      Potassium 4 2 mmol/L      Chloride 101 mmol/L      CO2 31 mmol/L      ANION GAP 4 mmol/L      BUN 11 mg/dL      Creatinine 0 76 mg/dL      Glucose 98 mg/dL      Calcium 9 2 mg/dL      AST 20 U/L      ALT 24 U/L      Alkaline Phosphatase 69 U/L      Total Protein 7 8 g/dL      Albumin 4 0 g/dL      Total Bilirubin 0 80 mg/dL      eGFR 91 ml/min/1 73sq m     Narrative:      [place] guidelines for Chronic Kidney Disease (CKD):     Stage 1 with normal or high GFR (GFR > 90 mL/min/1 73 square meters)    Stage 2 Mild CKD (GFR = 60-89 mL/min/1 73 square meters)    Stage 3A Moderate CKD (GFR = 45-59 mL/min/1 73 square meters)    Stage 3B Moderate CKD (GFR = 30-44 mL/min/1 73 square meters)    Stage 4 Severe CKD (GFR = 15-29 mL/min/1 73 square meters)    Stage 5 End Stage CKD (GFR <15 mL/min/1 73 square meters)  Note: GFR calculation is accurate only with a steady state creatinine    Fingerstick Glucose (POCT) [[id number]]  (Normal) Collected: 03/04/22 1520    Lab Status: Final result Updated: 03/04/22 1521     POC Glucose 84 mg/dl CT recon only cervical spine (No Charge)   Final Result by Donnie Walker MD (03/04 2134)      Multilevel degenerative changes causing multilevel neural foraminal and spinal canal stenosis as described  Suspect asymmetric soft tissue in the right paraspinal region  Correlate with contrast-enhanced cervical spine MRI recommended to exclude paraspinal soft tissue infection  Workstation performed: [id number]         CTA head and neck with and without contrast   Final Result by Donnie Walker MD (03/04 1652)         1  No evidence of acute intracranial hemorrhage  2  No evidence of occlusive disease  Workstation performed: [id number]         XR chest portable   Final Result by Abdirahman Boyle MD (03/04 1532)      No acute cardiopulmonary disease  Workstation performed: [id number]         MRI inpatient order    (Results Pending)   MRI inpatient order    (Results Pending)              Procedures  ECG 12 Lead Documentation Only    Date/Time: 3/4/2022 2:44 PM  Performed by: Arlette Gonsalez MD  Authorized by: Arlette Gonsalez MD     Indications / Diagnosis:  Possible stroke  ECG reviewed by me, the ED Provider: yes    Patient location:  ED  Previous ECG:     Previous ECG:  Compared to current    Comparison ECG info:  11/9/21 normal sinus rhythm with 1st degree av block and rbbb    Similarity:  No change  Interpretation:     Interpretation: abnormal    Rate:     ECG rate:  59    ECG rate assessment: bradycardic    Rhythm:     Rhythm: sinus bradycardia    Ectopy:     Ectopy: none    QRS:     QRS axis:  Right    QRS intervals:   Wide  Conduction:     Conduction: abnormal      Abnormal conduction: complete RBBB and 1st degree    ST segments:     ST segments:  Normal  T waves:     T waves: inverted      Inverted:  III, V2, V3 and aVF             ED Course  ED Course as of 03/05/22 0746   Fri Mar 04, 2022   1548 POCT INR(!): 2 02  therapeutic                  Stroke Assessment     Row Name 03/04/22 1600             NIH Stroke Scale    Interval Baseline      Level of Consciousness (1a ) 0      LOC Questions (1b ) 0      LOC Commands (1c ) 0      Best Gaze (2 ) 0      Visual (3 ) 0      Facial Palsy (4 ) 0      Motor Arm, Left (5a ) 0      Motor Arm, Right (5b ) 0      Motor Leg, Left (6a ) 0      Motor Leg, Right (6b ) 1      Limb Ataxia (7 ) 0      Sensory (8 ) 1      Best Language (9 ) 0      Dysarthria (10 ) 0      Extinction and Inattention (11 ) (Formerly Neglect) 0      Total 2                Most Recent Value   TPA Decision Options    TPA Decision Patient not a TPA candidate  Patient is not a candidate options Unclear time of onset outside appropriate time window , Bleeding risk  [therapeutic coumadin level]                    SBIRT 22yo+      Most Recent Value   SBIRT (24 yo +)    In order to provide better care to our patients, we are screening all of our patients for alcohol and drug use  Would it be okay to ask you these screening questions? Yes Filed at: 03/04/2022 1609   Initial Alcohol Screen: US AUDIT-C     1  How often do you have a drink containing alcohol? 0 Filed at: 03/04/2022 1609   2  How many drinks containing alcohol do you have on a typical day you are drinking? 0 Filed at: 03/04/2022 1609   3a  Male UNDER 65: How often do you have five or more drinks on one occasion? 0 Filed at: 03/04/2022 1609   3b  FEMALE Any Age, or MALE 65+: How often do you have 4 or more drinks on one occassion? 0 Filed at: 03/04/2022 1609   Audit-C Score 0 Filed at: 03/04/2022 1609   JONATHAN: How many times in the past year have you    Used an illegal drug or used a prescription medication for non-medical reasons?  Never Filed at: 03/04/2022 1609                    MDM  Number of Diagnoses or Management Options  Ambulatory dysfunction: new and requires workup  Leg weakness: new and requires workup  Diagnosis management comments: 71year old male presents for evaluation of unsteadiness associated with weakness of his right hand  and right leg for the past 3 weeks  Patient therapeutic on coumadin which he takes for PAF and factor V Leiden  Labs unremarkable  CTA head/neck unremarkable  Patient is not a tpa candidate due to time since onset and bleeding risk with therapeutic coumadin  Case discussed with neurology  Patient admitted for further evaluation and management  Amount and/or Complexity of Data Reviewed  Clinical lab tests: ordered and reviewed  Tests in the radiology section of CPT®: ordered and reviewed  Discuss the patient with other providers: yes    Patient Progress  Patient progress: stable      Disposition  Final diagnoses:   Ambulatory dysfunction   Leg weakness     Time reflects when diagnosis was documented in both MDM as applicable and the Disposition within this note     Time User Action Codes Description Comment    3/4/2022  3:35 PM Teddy Linder Add [R26 2] Ambulatory dysfunction     3/4/2022  4:59 PM Isabell [place] Add [K59 847] Leg weakness       ED Disposition     ED Disposition Condition Date/Time Comment    Admit Stable Fri Mar 4, 2022  4:59 PM Case was discussed with PATY and the patient's admission status was agreed to be Admission Status: inpatient status to the service of Dr Francesca Wooten           Follow-up Information    None         Current Discharge Medication List      CONTINUE these medications which have NOT CHANGED    Details   celecoxib (CeleBREX) 200 mg capsule TAKE 1 CAPSULE BY MOUTH  DAILY  Qty: 90 capsule, Refills: 1    Associated Diagnoses: Lumbar herniated disc      cephalexin (KEFLEX) 500 mg capsule Take 1 capsule (500 mg total) by mouth every 8 (eight) hours for 10 days  Qty: 30 capsule, Refills: 0    Associated Diagnoses: Cellulitis of lower extremity, unspecified laterality      [facility]S Vitamin C 500 MG tablet TAKE 1 TABLET BY MOUTH 2 TIMES A DAY  Qty: 100 tablet, Refills: 0    Associated Diagnoses: Arthritis of right knee; Preop testing      Diclofenac Sodium (VOLTAREN EX) Apply topically        DULoxetine (CYMBALTA) 60 mg delayed release capsule TAKE 1 CAPSULE BY MOUTH  DAILY  Qty: 90 capsule, Refills: 1    Associated Diagnoses: Depression, unspecified depression type      flecainide (TAMBOCOR) 100 mg tablet TAKE 1 TABLET BY MOUTH  TWICE DAILY  Qty: 180 tablet, Refills: 1    Associated Diagnoses: Atrial fibrillation, unspecified type ([facility])      metoprolol succinate (TOPROL-XL) 100 mg 24 hr tablet TAKE 1 TABLET BY MOUTH  DAILY  Qty: 90 tablet, Refills: 0    Associated Diagnoses: Atrial fibrillation, unspecified type ([facility] [address] ); Essential hypertension      pravastatin (PRAVACHOL) 40 mg tablet TAKE 1 TABLET BY MOUTH  DAILY  Qty: 90 tablet, Refills: 1    Associated Diagnoses: Dyslipidemia      tamsulosin (FLOMAX) 0 4 mg TAKE 1 CAPSULE BY MOUTH  DAILY WITH DINNER  Qty: 90 capsule, Refills: 1    Associated Diagnoses: BPH with obstruction/lower urinary tract symptoms      warfarin (COUMADIN) 5 mg tablet TAKE 1 TABLET BY MOUTH ON  WEDNESDAY AND SATURDAY, AND 1/2 TABLET THE REST OF THE  WEEK OR AS DIRECTED BY  PT/INR  Qty: 90 tablet, Refills: 1    Associated Diagnoses: Long term current use of anticoagulant; Anticoagulated on Coumadin      zolpidem (AMBIEN) 10 mg tablet Take 1 tablet (10 mg total) by mouth daily at bedtime as needed for sleep  Qty: 90 tablet, Refills: 1    Associated Diagnoses: Insomnia, unspecified type             No discharge procedures on file      PDMP Review       Value Time User    PDMP Reviewed  Yes 3/4/2022  5:58 PM Patricia Peña PA-C          ED Provider  Electronically Signed by           Abril Frye MD  03/05/22 7271

## 2022-03-04 NOTE — ASSESSMENT & PLAN NOTE
· Patient presented to the emergency department with difficulty ambulating, has been using a cane    Currently is planning on eventual bilateral knee replacements with orthopedics  · Fall precautions  · PT/OT consult

## 2022-03-04 NOTE — ASSESSMENT & PLAN NOTE
· Presented to the emergency department with bilateral lower extremity weakness, R > L  Patient also noted intermittent right-sided neck discomfort and associated right arm numbness/tingling, weakened  strength x3 weeks  · CTA head and neck negative for acute abnormalities  · ED attending discussed with Neurology - recommended admission to stroke pathway  · Obtain MRI brain    Of note patient did have echocardiogram as outpatient in November 2021 will defer to Neurology if repeat echocardiogram is recommended  · Will obtain CT cervical spine as right arm symptoms may be related to radiculopathy  · Continue Coumadin - INR therapeutic at 2 02  · Start Lipitor 40 mg daily  · PT/OT consult  · Await formal neurology evaluation

## 2022-03-04 NOTE — H&P
[place]     H&P- Kev Bring 1950, 71 y o  male MRN: [id number]  Unit/Bed#: -[facility]aya Encounter: [id number]  Primary Care Provider: Chrystal Frankel, MD   Date and time admitted to hospital: 3/4/2022  2:49 PM    * Weakness  Assessment & Plan  · Presented to the emergency department with bilateral lower extremity weakness, R > L  Patient also noted intermittent right-sided neck discomfort and associated right arm numbness/tingling, weakened  strength x3 weeks  · CTA head and neck negative for acute abnormalities  · ED attending discussed with Neurology - recommended admission to stroke pathway  · Obtain MRI brain  Of note patient did have echocardiogram as outpatient in November 2021 will defer to Neurology if repeat echocardiogram is recommended  · Will obtain CT cervical spine as right arm symptoms may be related to radiculopathy  · Continue Coumadin - INR therapeutic at 2 02  · Start Lipitor 40 mg daily  · PT/OT consult  · Await formal neurology evaluation    Ambulatory dysfunction  Assessment & Plan  · Patient presented to the emergency department with difficulty ambulating, has been using a cane  Currently is planning on eventual bilateral knee replacements with orthopedics  · Fall precautions  · PT/OT consult    Paroxysmal [facility])  Assessment & Plan  · Patient follows with cardiology as outpatient  · Currently in NSR  · Rate controlled on metoprolol/flecainide  · Anticoagulated on Coumadin, INR therapeutic 2 02    WILL (obstructive sleep apnea)  Assessment & Plan  · Continue CPAP q h s  Essential hypertension  Assessment & Plan  · Blood pressure stable  · Continue metoprolol    Dyslipidemia  Assessment & Plan  · On pravastatin 40 mg daily at home  · Transition to Lipitor 40 mg daily in setting of stroke pathway    VTE Pharmacologic Prophylaxis: VTE Score: 3 Moderate Risk (Score 3-4) - Pharmacological DVT Prophylaxis Ordered: warfarin (Coumadin)    Code Status: Level 3 - DNAR and DNI   Discussion with family: Updated  (wife) via phone  Anticipated Length of Stay: Patient will be admitted on an inpatient basis with an anticipated length of stay of greater than 2 midnights secondary to Neurology evaluation, stroke workup, PT/OT evaluation  Total Time for Visit, including Counseling / Coordination of Care: 70 minutes Greater than 50% of this total time spent on direct patient counseling and coordination of care  Chief Complaint:  Weakness    History of Present Illness:  Alexandru Kathleen is a 71 y o  male with a PMH of essential hypertension, paroxysmal AFib, obesity, osteoarthritis who presents with ambulatory dysfunction, weakness  Patient presented to the emergency department due to ongoing weakness for the last several days  Patient noticed ambulatory dysfunction approximately 1 week ago and was given a cane by his orthopedic doctor given unsteadiness with ambulation  He has noted intermittent right arm tingling particularly of the 4th and 5th digits and difficulty with right shoulder flexion above shoulder height  Several days ago he noted right-sided neck stiffness/discomfort which appeared to pre date the right upper extremity weakness  He reports he does have bilateral knee pain at baseline but felt that his lower legs have been particularly weak and that he feels unsteady  Patient's wife describes that his gait has been very unsteady and wobbly  He denies any numbness/tingling of the lower extremities  He had been started on Keflex about 1 week ago for cellulitis which is improving  Patient denies any headache, visual changes, slurred speech, facial droop  Denies any chest pain/palpitations, shortness of breath, nausea vomiting, abdominal pain  He also notes last several days he has had urinary urgency and associated incontinence  Denies any dysuria or hematuria, no flank pain        Review of Systems:  Review of Systems Constitutional: Positive for fatigue  Negative for chills, fever and unexpected weight change  HENT: Negative for congestion, sore throat and trouble swallowing  Eyes: Negative for photophobia, pain and visual disturbance  Respiratory: Negative for cough, shortness of breath and wheezing  Cardiovascular: Negative for chest pain, palpitations and leg swelling  Gastrointestinal: Negative for abdominal pain, constipation, diarrhea, nausea and vomiting  Endocrine: Negative for polyuria  Genitourinary: Positive for urgency  Negative for difficulty urinating, dysuria, flank pain and hematuria  Musculoskeletal: Positive for gait problem and neck pain  Negative for back pain, myalgias and neck stiffness  Skin: Negative for pallor and rash  Neurological: Positive for weakness and numbness  Negative for dizziness, tremors, syncope, speech difficulty, light-headedness and headaches  Hematological: Does not bruise/bleed easily  Psychiatric/Behavioral: Negative for agitation and confusion  Past Medical and Surgical History:   Past Medical History:   Diagnosis Date    Acute venous embolism and thrombosis of deep vessels of proximal lower extremity (HCC)     Last assessed: 5/18/15    Arthritis     Cellulitis     LE    CPAP (continuous positive airway pressure) dependence     [facility] )     Forgetfulness     [place] (hard of hearing)     Paroxysmal atrial fibrillation (HCC)     Last assessed: 11/2/15    Sleep apnea        Past Surgical History:   Procedure Laterality Date    BACK SURGERY      Lower    COLON SURGERY      COLONOSCOPY         Meds/Allergies:  Prior to Admission medications    Medication Sig Start Date End Date Taking?  Authorizing Provider   celecoxib (CeleBREX) 200 mg capsule TAKE 1 CAPSULE BY MOUTH  DAILY 10/5/21   Vivian Rosenberg MD   cephalexin [facility]) 500 mg capsule Take 1 capsule (500 mg total) by mouth every 8 (eight) hours for 10 days 2/25/22 3/7/22  Jimena Staton MD Gilberto   [facility] Vitamin C 500 MG tablet TAKE 1 TABLET BY MOUTH 2 TIMES A DAY 1/15/21   Yaquelin Juarez MD   Diclofenac Sodium (VOLTAREN EX) Apply topically      Historical Provider, MD   DULoxetine (CYMBALTA) 60 mg delayed release capsule TAKE 1 CAPSULE BY MOUTH  DAILY  Patient not taking: Reported on 2/24/2022 10/5/21   Trey Giordano MD   flecainide (TAMBOCOR) 100 mg tablet TAKE 1 TABLET BY MOUTH  TWICE DAILY 10/5/21   Trey Giordano MD   metoprolol succinate (TOPROL-XL) 100 mg 24 hr tablet TAKE 1 TABLET BY MOUTH  DAILY 1/12/22   Trey Giordano MD   pravastatin (PRAVACHOL) 40 mg tablet TAKE 1 TABLET BY MOUTH  DAILY 10/5/21   Trey Giordano MD   tamsulosin (FLOMAX) 0 4 mg TAKE 1 CAPSULE BY MOUTH  DAILY WITH DINNER 10/5/21   Trey Giordano MD   warfarin (COUMADIN) 5 mg tablet TAKE 1 TABLET BY MOUTH ON  WEDNESDAY AND SATURDAY, AND 1/2 TABLET THE REST OF THE  WEEK OR AS DIRECTED BY  PT/INR 7/26/21   Trey Giordano MD   zolpidem (AMBIEN) 10 mg tablet Take 1 tablet (10 mg total) by mouth daily at bedtime as needed for sleep 1/19/22   Trey Giordano MD     I have reviewed home medications with patient personally  Allergies:    Allergies   Allergen Reactions    Morphine GI Intolerance    Vitamin K Other (See Comments)     On coumadin-patient sensitive to vitamin K amounts in meds etc        Social History:  Marital Status: /Civil Union   Occupation:  Retired  Patient Pre-hospital Living Situation: Home  Patient Pre-hospital Level of Mobility: walks with cane  Patient Pre-hospital Diet Restrictions:   Substance Use History:   Social History     Substance and Sexual Activity   Alcohol Use Not Currently     Social History     Tobacco Use   Smoking Status Never Smoker   Smokeless Tobacco Never Used     Social History     Substance and Sexual Activity   Drug Use No       Family History:  Family History   Problem Relation Age of Onset    Lung cancer Mother     No Known Problems Father     Heart attack Brother     Atrial fibrillation Brother     Emphysema Sister        Physical Exam:     Vitals:   Blood Pressure: 156/70 (03/04/22 1730)  Pulse: (!) 51 (03/04/22 1730)  Temperature: 97 8 °F (36 6 °C) (03/04/22 1444)  Respirations: 18 (03/04/22 1730)  Height: 5' 11" (180 3 cm) (03/04/22 1444)  Weight - Scale: 112 kg (248 lb) (03/04/22 1444)  SpO2: 98 % (03/04/22 1730)    Physical Exam  Vitals and nursing note reviewed  Constitutional:       Appearance: He is well-developed  Comments: Appears comfortable, no acute distress   HENT:      Head: Normocephalic and atraumatic  Eyes:      General: No scleral icterus  Extraocular Movements: Extraocular movements intact  Conjunctiva/sclera: Conjunctivae normal    Neck:      Comments: Right sided muscular tenderness posterior cervical spine  Cardiovascular:      Rate and Rhythm: Regular rhythm  Bradycardia present  Heart sounds: S1 normal and S2 normal  No murmur heard  Pulmonary:      Effort: Pulmonary effort is normal  No respiratory distress  Breath sounds: Normal breath sounds  No wheezing, rhonchi or rales  Abdominal:      General: Bowel sounds are normal       Palpations: Abdomen is soft  Tenderness: There is no abdominal tenderness  There is no guarding or rebound  Musculoskeletal:      Cervical back: Normal range of motion  Muscular tenderness present  Comments: Right upper extremity shoulder flexion limited to shoulder height  Mild right hand  strength deficit  ? Possible minimal RLE weakness when compared to left may be limited due to pain  No extremity edema   Skin:     General: Skin is warm and dry  Comments: Improving lower ext cellulitis bilaterally   Neurological:      Mental Status: He is alert and oriented to person, place, and time  Cranial Nerves: Cranial nerves are intact  Sensory: Sensation is intact  Motor: Weakness present  Comments: Mild right hand  strength weakness    RUE ROM limited only able to lift up to shoulder height  ? Minimal weakness RLE compared to LLE  No sensory deficit   Psychiatric:         Mood and Affect: Mood normal          Speech: Speech normal          Behavior: Behavior normal           Additional Data:     Lab Results:  Results from last 7 days   Lab Units 03/04/22  1454   WBC Thousand/uL 5 22   HEMOGLOBIN g/dL 14 1   HEMATOCRIT % 42 9   PLATELETS Thousands/uL 221   NEUTROS PCT % 54   LYMPHS PCT % 34   MONOS PCT % 10   EOS PCT % 1     Results from last 7 days   Lab Units 03/04/22  1454   SODIUM mmol/L 136   POTASSIUM mmol/L 4 2   CHLORIDE mmol/L 101   CO2 mmol/L 31   BUN mg/dL 11   CREATININE mg/dL 0 76   ANION GAP mmol/L 4   CALCIUM mg/dL 9 2   ALBUMIN g/dL 4 0   TOTAL BILIRUBIN mg/dL 0 80   ALK PHOS U/L 69   ALT U/L 24   AST U/L 20   GLUCOSE RANDOM mg/dL 98     Results from last 7 days   Lab Units 03/04/22  1454   INR  2 02*     Results from last 7 days   Lab Units 03/04/22  1520   POC GLUCOSE mg/dl 84               Imaging: Reviewed radiology reports from this admission including: CT head  CTA head and neck with and without contrast   Final Result by Keesha Patel MD (03/04 1652)         1  No evidence of acute intracranial hemorrhage  2  No evidence of occlusive disease  Workstation performed: [id number]         XR chest portable   Final Result by Nilton Lester MD (03/04 1532)      No acute cardiopulmonary disease  Workstation performed: [id number]         MRI inpatient order    (Results Pending)   CT spine cervical wo contrast    (Results Pending)       EKG and Other Studies Reviewed on Admission:   · EKG: Sinus Bradycardia  HR 59     ** Please Note: This note has been constructed using a voice recognition system   **

## 2022-03-05 ENCOUNTER — APPOINTMENT (INPATIENT)
Dept: MRI IMAGING | Facility: HOSPITAL | Age: 72
DRG: 093 | End: 2022-03-05
Payer: COMMERCIAL

## 2022-03-05 ENCOUNTER — HOSPITAL ENCOUNTER (INPATIENT)
Facility: HOSPITAL | Age: 72
LOS: 13 days | DRG: 472 | End: 2022-03-18
Attending: HOSPITALIST | Admitting: HOSPITALIST
Payer: COMMERCIAL

## 2022-03-05 VITALS
SYSTOLIC BLOOD PRESSURE: 137 MMHG | OXYGEN SATURATION: 97 % | HEIGHT: 71 IN | RESPIRATION RATE: 18 BRPM | WEIGHT: 248 LBS | DIASTOLIC BLOOD PRESSURE: 76 MMHG | TEMPERATURE: 98.2 F | BODY MASS INDEX: 34.72 KG/M2 | HEART RATE: 65 BPM

## 2022-03-05 DIAGNOSIS — R26.2 AMBULATORY DYSFUNCTION: ICD-10-CM

## 2022-03-05 DIAGNOSIS — E78.5 DYSLIPIDEMIA: ICD-10-CM

## 2022-03-05 DIAGNOSIS — I10 ESSENTIAL HYPERTENSION: ICD-10-CM

## 2022-03-05 DIAGNOSIS — G95.9 CERVICAL MYELOPATHY (HCC): Primary | ICD-10-CM

## 2022-03-05 DIAGNOSIS — I48.0 PAROXYSMAL A-FIB (HCC): Chronic | ICD-10-CM

## 2022-03-05 PROBLEM — E78.2 MIXED HYPERLIPIDEMIA: Chronic | Status: ACTIVE | Noted: 2020-10-27

## 2022-03-05 LAB
ANION GAP SERPL CALCULATED.3IONS-SCNC: 6 MMOL/L (ref 4–13)
BASOPHILS # BLD AUTO: 0.04 THOUSANDS/ΜL (ref 0–0.1)
BASOPHILS NFR BLD AUTO: 1 % (ref 0–1)
BILIRUB UR QL STRIP: NEGATIVE
BUN SERPL-MCNC: 12 MG/DL (ref 5–25)
CALCIUM SERPL-MCNC: 8.6 MG/DL (ref 8.3–10.1)
CHLORIDE SERPL-SCNC: 102 MMOL/L (ref 100–108)
CHOLEST SERPL-MCNC: 206 MG/DL
CLARITY UR: CLEAR
CO2 SERPL-SCNC: 29 MMOL/L (ref 21–32)
COLOR UR: ABNORMAL
CREAT SERPL-MCNC: 0.76 MG/DL (ref 0.6–1.3)
EOSINOPHIL # BLD AUTO: 0.1 THOUSAND/ΜL (ref 0–0.61)
EOSINOPHIL NFR BLD AUTO: 2 % (ref 0–6)
ERYTHROCYTE [DISTWIDTH] IN BLOOD BY AUTOMATED COUNT: 12.5 % (ref 11.6–15.1)
EST. AVERAGE GLUCOSE BLD GHB EST-MCNC: 134 MG/DL
GFR SERPL CREATININE-BSD FRML MDRD: 91 ML/MIN/1.73SQ M
GLUCOSE SERPL-MCNC: 104 MG/DL (ref 65–140)
GLUCOSE UR STRIP-MCNC: NEGATIVE MG/DL
HBA1C MFR BLD: 6.3 %
HCT VFR BLD AUTO: 40.9 % (ref 36.5–49.3)
HDLC SERPL-MCNC: 50 MG/DL
HGB BLD-MCNC: 13.2 G/DL (ref 12–17)
HGB UR QL STRIP.AUTO: NEGATIVE
IMM GRANULOCYTES # BLD AUTO: 0 THOUSAND/UL (ref 0–0.2)
IMM GRANULOCYTES NFR BLD AUTO: 0 % (ref 0–2)
INR PPP: 2 (ref 0.84–1.19)
KETONES UR STRIP-MCNC: NEGATIVE MG/DL
LDLC SERPL CALC-MCNC: 123 MG/DL (ref 0–100)
LEUKOCYTE ESTERASE UR QL STRIP: NEGATIVE
LYMPHOCYTES # BLD AUTO: 1.67 THOUSANDS/ΜL (ref 0.6–4.47)
LYMPHOCYTES NFR BLD AUTO: 38 % (ref 14–44)
MCH RBC QN AUTO: 29.7 PG (ref 26.8–34.3)
MCHC RBC AUTO-ENTMCNC: 32.3 G/DL (ref 31.4–37.4)
MCV RBC AUTO: 92 FL (ref 82–98)
MONOCYTES # BLD AUTO: 0.56 THOUSAND/ΜL (ref 0.17–1.22)
MONOCYTES NFR BLD AUTO: 13 % (ref 4–12)
NEUTROPHILS # BLD AUTO: 2.03 THOUSANDS/ΜL (ref 1.85–7.62)
NEUTS SEG NFR BLD AUTO: 46 % (ref 43–75)
NITRITE UR QL STRIP: NEGATIVE
NRBC BLD AUTO-RTO: 0 /100 WBCS
PH UR STRIP.AUTO: 6.5 [PH]
PLATELET # BLD AUTO: 189 THOUSANDS/UL (ref 149–390)
PMV BLD AUTO: 9.2 FL (ref 8.9–12.7)
POTASSIUM SERPL-SCNC: 3.8 MMOL/L (ref 3.5–5.3)
PROT UR STRIP-MCNC: NEGATIVE MG/DL
PROTHROMBIN TIME: 22.2 SECONDS (ref 11.6–14.5)
RBC # BLD AUTO: 4.45 MILLION/UL (ref 3.88–5.62)
SODIUM SERPL-SCNC: 137 MMOL/L (ref 136–145)
SP GR UR STRIP.AUTO: 1.02 (ref 1–1.03)
TRIGL SERPL-MCNC: 164 MG/DL
TSH SERPL DL<=0.05 MIU/L-ACNC: 2.09 UIU/ML (ref 0.36–3.74)
UROBILINOGEN UR STRIP-ACNC: 3 MG/DL
WBC # BLD AUTO: 4.4 THOUSAND/UL (ref 4.31–10.16)

## 2022-03-05 PROCEDURE — 70551 MRI BRAIN STEM W/O DYE: CPT

## 2022-03-05 PROCEDURE — 99239 HOSP IP/OBS DSCHRG MGMT >30: CPT | Performed by: PHYSICIAN ASSISTANT

## 2022-03-05 PROCEDURE — G1004 CDSM NDSC: HCPCS

## 2022-03-05 PROCEDURE — 80048 BASIC METABOLIC PNL TOTAL CA: CPT | Performed by: INTERNAL MEDICINE

## 2022-03-05 PROCEDURE — NC001 PR NO CHARGE: Performed by: NEUROLOGICAL SURGERY

## 2022-03-05 PROCEDURE — 84443 ASSAY THYROID STIM HORMONE: CPT | Performed by: INTERNAL MEDICINE

## 2022-03-05 PROCEDURE — 85025 COMPLETE CBC W/AUTO DIFF WBC: CPT | Performed by: INTERNAL MEDICINE

## 2022-03-05 PROCEDURE — A9585 GADOBUTROL INJECTION: HCPCS | Performed by: INTERNAL MEDICINE

## 2022-03-05 PROCEDURE — NC001 PR NO CHARGE: Performed by: PHYSICIAN ASSISTANT

## 2022-03-05 PROCEDURE — 99221 1ST HOSP IP/OBS SF/LOW 40: CPT | Performed by: PSYCHIATRY & NEUROLOGY

## 2022-03-05 PROCEDURE — 85610 PROTHROMBIN TIME: CPT | Performed by: INTERNAL MEDICINE

## 2022-03-05 PROCEDURE — 72156 MRI NECK SPINE W/O & W/DYE: CPT

## 2022-03-05 PROCEDURE — 99223 1ST HOSP IP/OBS HIGH 75: CPT | Performed by: HOSPITALIST

## 2022-03-05 PROCEDURE — 83036 HEMOGLOBIN GLYCOSYLATED A1C: CPT | Performed by: INTERNAL MEDICINE

## 2022-03-05 PROCEDURE — 81003 URINALYSIS AUTO W/O SCOPE: CPT | Performed by: HOSPITALIST

## 2022-03-05 PROCEDURE — 80061 LIPID PANEL: CPT | Performed by: INTERNAL MEDICINE

## 2022-03-05 RX ORDER — CHLORHEXIDINE GLUCONATE 4 G/100ML
SOLUTION TOPICAL DAILY PRN
Status: DISCONTINUED | OUTPATIENT
Start: 2022-03-07 | End: 2022-03-07

## 2022-03-05 RX ORDER — ACETAMINOPHEN 325 MG/1
650 TABLET ORAL EVERY 4 HOURS PRN
Status: DISCONTINUED | OUTPATIENT
Start: 2022-03-05 | End: 2022-03-11

## 2022-03-05 RX ORDER — ACETAMINOPHEN 325 MG/1
650 TABLET ORAL EVERY 4 HOURS PRN
Status: CANCELLED | OUTPATIENT
Start: 2022-03-05

## 2022-03-05 RX ORDER — DULOXETIN HYDROCHLORIDE 60 MG/1
60 CAPSULE, DELAYED RELEASE ORAL
Status: DISCONTINUED | OUTPATIENT
Start: 2022-03-05 | End: 2022-03-18 | Stop reason: HOSPADM

## 2022-03-05 RX ORDER — METOPROLOL SUCCINATE 50 MG/1
100 TABLET, EXTENDED RELEASE ORAL DAILY
Status: DISCONTINUED | OUTPATIENT
Start: 2022-03-06 | End: 2022-03-05 | Stop reason: HOSPADM

## 2022-03-05 RX ORDER — CELECOXIB 100 MG/1
200 CAPSULE ORAL DAILY
Status: CANCELLED | OUTPATIENT
Start: 2022-03-05

## 2022-03-05 RX ORDER — TAMSULOSIN HYDROCHLORIDE 0.4 MG/1
0.4 CAPSULE ORAL
Status: DISCONTINUED | OUTPATIENT
Start: 2022-03-06 | End: 2022-03-08

## 2022-03-05 RX ORDER — CHLORHEXIDINE GLUCONATE 0.12 MG/ML
15 RINSE ORAL ONCE
Status: CANCELLED | OUTPATIENT
Start: 2022-03-05 | End: 2022-03-05

## 2022-03-05 RX ORDER — TAMSULOSIN HYDROCHLORIDE 0.4 MG/1
0.4 CAPSULE ORAL
Status: CANCELLED | OUTPATIENT
Start: 2022-03-05

## 2022-03-05 RX ORDER — DEXAMETHASONE SODIUM PHOSPHATE 4 MG/ML
10 INJECTION, SOLUTION INTRA-ARTICULAR; INTRALESIONAL; INTRAMUSCULAR; INTRAVENOUS; SOFT TISSUE DAILY
Status: DISCONTINUED | OUTPATIENT
Start: 2022-03-06 | End: 2022-03-07

## 2022-03-05 RX ORDER — CEPHALEXIN 250 MG/1
500 CAPSULE ORAL EVERY 8 HOURS SCHEDULED
Status: CANCELLED | OUTPATIENT
Start: 2022-03-05 | End: 2022-03-07

## 2022-03-05 RX ORDER — DEXAMETHASONE SODIUM PHOSPHATE 4 MG/ML
10 INJECTION, SOLUTION INTRA-ARTICULAR; INTRALESIONAL; INTRAMUSCULAR; INTRAVENOUS; SOFT TISSUE DAILY
Status: CANCELLED | OUTPATIENT
Start: 2022-03-06

## 2022-03-05 RX ORDER — ACETAMINOPHEN 325 MG/1
975 TABLET ORAL ONCE
Status: DISCONTINUED | OUTPATIENT
Start: 2022-03-07 | End: 2022-03-07

## 2022-03-05 RX ORDER — DEXAMETHASONE SODIUM PHOSPHATE 4 MG/ML
10 INJECTION, SOLUTION INTRA-ARTICULAR; INTRALESIONAL; INTRAMUSCULAR; INTRAVENOUS; SOFT TISSUE ONCE
Status: DISCONTINUED | OUTPATIENT
Start: 2022-03-05 | End: 2022-03-05 | Stop reason: HOSPADM

## 2022-03-05 RX ORDER — CHLORHEXIDINE GLUCONATE 0.12 MG/ML
15 RINSE ORAL ONCE
Status: DISCONTINUED | OUTPATIENT
Start: 2022-03-07 | End: 2022-03-07

## 2022-03-05 RX ORDER — ATORVASTATIN CALCIUM 40 MG/1
40 TABLET, FILM COATED ORAL EVERY EVENING
Status: DISCONTINUED | OUTPATIENT
Start: 2022-03-05 | End: 2022-03-18 | Stop reason: HOSPADM

## 2022-03-05 RX ORDER — SODIUM CHLORIDE 9 MG/ML
75 INJECTION, SOLUTION INTRAVENOUS CONTINUOUS
Status: DISCONTINUED | OUTPATIENT
Start: 2022-03-05 | End: 2022-03-10

## 2022-03-05 RX ORDER — FLECAINIDE ACETATE 100 MG/1
100 TABLET ORAL 2 TIMES DAILY
Status: CANCELLED | OUTPATIENT
Start: 2022-03-05

## 2022-03-05 RX ORDER — CELECOXIB 200 MG/1
200 CAPSULE ORAL DAILY
Status: DISCONTINUED | OUTPATIENT
Start: 2022-03-05 | End: 2022-03-06

## 2022-03-05 RX ORDER — ACETAMINOPHEN 325 MG/1
975 TABLET ORAL ONCE
Status: CANCELLED | OUTPATIENT
Start: 2022-03-05 | End: 2022-03-05

## 2022-03-05 RX ORDER — METOPROLOL SUCCINATE 100 MG/1
100 TABLET, EXTENDED RELEASE ORAL DAILY
Status: DISCONTINUED | OUTPATIENT
Start: 2022-03-06 | End: 2022-03-18 | Stop reason: HOSPADM

## 2022-03-05 RX ORDER — FLECAINIDE ACETATE 100 MG/1
100 TABLET ORAL 2 TIMES DAILY
Status: DISCONTINUED | OUTPATIENT
Start: 2022-03-05 | End: 2022-03-18 | Stop reason: HOSPADM

## 2022-03-05 RX ORDER — METOPROLOL SUCCINATE 50 MG/1
100 TABLET, EXTENDED RELEASE ORAL DAILY
Status: CANCELLED | OUTPATIENT
Start: 2022-03-06

## 2022-03-05 RX ORDER — CEFAZOLIN SODIUM 2 G/50ML
2000 SOLUTION INTRAVENOUS ONCE
Status: DISCONTINUED | OUTPATIENT
Start: 2022-03-07 | End: 2022-03-07

## 2022-03-05 RX ORDER — ATORVASTATIN CALCIUM 40 MG/1
40 TABLET, FILM COATED ORAL EVERY EVENING
Status: CANCELLED | OUTPATIENT
Start: 2022-03-05

## 2022-03-05 RX ORDER — SODIUM CHLORIDE 9 MG/ML
75 INJECTION, SOLUTION INTRAVENOUS CONTINUOUS
Status: CANCELLED | OUTPATIENT
Start: 2022-03-05

## 2022-03-05 RX ORDER — CHLORHEXIDINE GLUCONATE 4 G/100ML
SOLUTION TOPICAL DAILY PRN
Status: CANCELLED | OUTPATIENT
Start: 2022-03-05

## 2022-03-05 RX ORDER — DULOXETIN HYDROCHLORIDE 30 MG/1
60 CAPSULE, DELAYED RELEASE ORAL
Status: CANCELLED | OUTPATIENT
Start: 2022-03-05

## 2022-03-05 RX ADMIN — GADOBUTROL 11 ML: 604.72 INJECTION INTRAVENOUS at 10:48

## 2022-03-05 RX ADMIN — DULOXETINE HYDROCHLORIDE 60 MG: 60 CAPSULE, DELAYED RELEASE ORAL at 21:29

## 2022-03-05 RX ADMIN — SODIUM CHLORIDE 75 ML/HR: 9 INJECTION, SOLUTION INTRAVENOUS at 21:34

## 2022-03-05 RX ADMIN — CEPHALEXIN 500 MG: 250 CAPSULE ORAL at 06:13

## 2022-03-05 RX ADMIN — FLECAINIDE ACETATE 100 MG: 100 TABLET ORAL at 09:31

## 2022-03-05 RX ADMIN — FLECAINIDE ACETATE 100 MG: 100 TABLET ORAL at 22:55

## 2022-03-05 RX ADMIN — CEPHALEXIN 500 MG: 250 CAPSULE ORAL at 15:17

## 2022-03-05 RX ADMIN — CELECOXIB 200 MG: 200 CAPSULE ORAL at 22:54

## 2022-03-05 NOTE — UTILIZATION REVIEW
Initial Clinical Review    Admission: Date/Time/Statement:   Admission Orders (From admission, onward)     Ordered        03/04/22 1659  Inpatient Admission  Once                      Orders Placed This Encounter   Procedures    Inpatient Admission     Standing Status:   Standing     Number of Occurrences:   1     Order Specific Question:   Level of Care     Answer:   Med Surg [16]     Order Specific Question:   Estimated length of stay     Answer:   More than 2 Midnights     Order Specific Question:   Certification     Answer:   I certify that inpatient services are medically necessary for this patient for a duration of greater than two midnights  See H&P and MD Progress Notes for additional information about the patient's course of treatment  ED Arrival Information     Expected Arrival Acuity    - 3/4/2022 14:18 Emergent         Means of arrival Escorted by Service Admission type    Wheelchair Spouse Hospitalist Emergency         Arrival complaint    right sided weakness, both legs are weak        Chief Complaint   Patient presents with    Extremity Weakness     dayne presents to ED with bilateral leg weakness  patient states she is scheudled to have both knees replaced and was given a cane for ambulating   Neck Pain     patient also complaining of right sided neck stiffness and pain that causing issues with right shoulder not being able to be raised and slight numbness to right hand      Initial Presentation:   Mr Becca García is a 71 yom who presents to the ED from home with c/o ambulatory dysfunction and weakness x several days with unsteady and wobbly gait  Recent cellulitis on antibiotics with improvement  Urinary urgency and incontinence  No flank pain, dysuria    Also c/o bilateral lower extremity weakness worse on the right and decreased right hand  for past 3 weeks with intermittent right sided neck pains associated with decreased sensation over the 4th and 5th digits of right hand and difficulty making fist on R hand  PMH: HTN, paroxysmal AFib, obesity, osteoarthritis  In the ED imaging is negative  He is admitted to INPATIENT status with Weakness - MRI Brain, CT C spine, coumadin continues, Start Lipitor, neuro consult, frequent neuro checks  Ambulatory dysfunction - PT/OT eval      Date: 3/5   Day 2:   MRI C spine completed and shows no acute abnormality - full report below  Pt reports improved motion of R shoulder  Still cannot clench fist   Mild numbness and tingling from shoulder distally  Neuro consult pending  Transfer orders are written if pt decides to remain in hospital      3/5 Neuro Consult - cervical myelopathy with new onset RUE and RLE progressive weakness with no inciting events  Discussed case with neurosurgery and plan is for transfer to [facility] for neurosurgical eval and possible neurosurgery intervention  Will bridge with Heparin, start IV Decadron 10 mg IV then 4 mg q 6 hr and slowly titrate down  Pt choice is to go home and f/u in office in a week and then OR when he can be scheduled  MRI Brain is not acute, Stroke alert canceled  3/5 Neurosurgery Telemed Consult  - progressive C myelopathy d/t C5-C6t stenosis with cord compression with progressive decline  Recommend surgery relatively soon  He can be transferred to [facility] but he wants to go home and if he goes home with see in office next week  Start on Decadron tapering dose  Needs to avoid activities with extreme ROM of C spine  Return to ED with any progressive symptoms  Hold Coumadin for factor V Leiden        ED Triage Vitals   Temperature Pulse Respirations Blood Pressure SpO2   03/04/22 1444 03/04/22 1444 03/04/22 1444 03/04/22 1444 03/04/22 1444   97 8 °F (36 6 °C) 59 16 123/76 98 %      Temp src Heart Rate Source Patient Position - Orthostatic VS BP Location FiO2 (%)   -- 03/04/22 1444 03/04/22 1444 03/04/22 1444 --    Monitor Sitting Left arm       Pain Score       03/04/22 1839       5          Wt Readings from Last 1 Encounters:   03/04/22 112 kg (248 lb)     Additional Vital Signs:   03/05/22 0900 -- -- -- -- -- 93 % None (Room air) --   03/04/22 22:03:56 98 1 °F (36 7 °C) 54 Abnormal  19 141/68 92 99 % -- --   03/04/22 19:12:41 98 3 °F (36 8 °C) 53 Abnormal  18 166/86 113 98 % -- --   03/04/22 1801 -- 54 Abnormal  -- 156/70 -- -- -- --   03/04/22 1730 -- 51 Abnormal  18 156/70 -- 98 % None (Room air) Lying   03/04/22 1645 -- 51 Abnormal  18 172/70 Abnormal  101 100 % None (Room air) Lying     Pertinent Labs/Diagnostic Test Results:     3/4 ECG - SB with 1st degree AV block    3/4 Echo  - P    3/5 MRI C spine - No acute abnormality  Motion limited  Multilevel degenerative spondylosis progressed from 2013 most severe at C5-6 with severe canal stenosis and flattening of the  cord  Small focus of T2 signal within the cord at C5-C6 may represent myelomalacia  Moderate canal stenosis at C3-4 mildly indents the cord without signal abnormality  Marked multilevel foraminal stenosis  No prevertebral edema or mass  MRI brain wo contrast   Final Result by Betsy Fermin MD (03/05 1056)      No acute intracranial pathology  Mild chronic microangiopathy  CT recon only cervical spine (No Charge)   Final Result by Abiola Sullivan MD (03/04 2134)      Multilevel degenerative changes causing multilevel neural foraminal and spinal canal stenosis as described  Suspect asymmetric soft tissue in the right paraspinal region  Correlate with contrast-enhanced cervical spine MRI recommended to exclude paraspinal soft tissue infection  CTA head and neck with and without contrast   Final Result by Abiola Sullivan MD (03/04 3402)         1  No evidence of acute intracranial hemorrhage  2  No evidence of occlusive disease  XR chest portable   Final Result by Flaco Ann MD (03/04 8202)      No acute cardiopulmonary disease              Results from last 7 days   Lab Units 03/05/22  0414 03/04/22  1454   WBC Thousand/uL 4 40 5 22   HEMOGLOBIN g/dL 13 2 14 1   HEMATOCRIT % 40 9 42 9   PLATELETS Thousands/uL 189 221   NEUTROS ABS Thousands/µL 2 03 2 82         Results from last 7 days   Lab Units 03/05/22  0414 03/04/22  1454   SODIUM mmol/L 137 136   POTASSIUM mmol/L 3 8 4 2   CHLORIDE mmol/L 102 101   CO2 mmol/L 29 31   ANION GAP mmol/L 6 4   BUN mg/dL 12 11   CREATININE mg/dL 0 76 0 76   EGFR ml/min/1 73sq m 91 91   CALCIUM mg/dL 8 6 9 2     Results from last 7 days   Lab Units 03/04/22  1454   AST U/L 20   ALT U/L 24   ALK PHOS U/L 69   TOTAL PROTEIN g/dL 7 8   ALBUMIN g/dL 4 0   TOTAL BILIRUBIN mg/dL 0 80     Results from last 7 days   Lab Units 03/04/22  1520   POC GLUCOSE mg/dl 84     Results from last 7 days   Lab Units 03/05/22  0414 03/04/22  1454   GLUCOSE RANDOM mg/dL 104 98         Results from last 7 days   Lab Units 03/05/22  0414   HEMOGLOBIN A1C % 6 3*   EAG mg/dl 134     Results from last 7 days   Lab Units 03/04/22  1454   HS TNI 0HR ng/L 3         Results from last 7 days   Lab Units 03/05/22  0414 03/04/22  1454   PROTIME seconds 22 2* 22 4*   INR  2 00* 2 02*   PTT seconds  --  33     Results from last 7 days   Lab Units 03/05/22  0414   TSH 3RD GENERATON uIU/mL 2 091                     Results from last 7 days   Lab Units 03/04/22  1454   NT-PRO BNP pg/mL 121   ED Treatment:   Medication Administration from 03/04/2022 1417 to 03/04/2022 1808    Date/Time Order Dose Route Action   03/04/2022 1647 iohexol (OMNIPAQUE) 350 MG/ML injection (SINGLE-DOSE) 100 mL 85 mL Intravenous Given        Past Medical History:   Diagnosis Date    Acute venous embolism and thrombosis of deep vessels of proximal lower extremity (HCC)     Last assessed: 5/18/15    Arthritis     Cellulitis     LE    CPAP (continuous positive airway pressure) dependence     Factor V Leiden ([address] )     Forgetfulness     [place] (hard of hearing)     Paroxysmal atrial fibrillation (HCC)     Last assessed: 11/2/15    Sleep apnea Present on Admission:   Essential hypertension   Ambulatory dysfunction   WILL (obstructive sleep apnea)   Dyslipidemia   Cervical myelopathy (HCC)   Factor V Leiden mutation (HCC)      Admitting Diagnosis: Weakness [R53 1]  Leg weakness [R29 898]  Ambulatory dysfunction [R26 2]  Age/Sex: 71 y o  male  Admission Orders:  Scheduled Medications:  atorvastatin, 40 mg, Oral, QPM  celecoxib, 200 mg, Oral, Daily  cephalexin, 500 mg, Oral, Q8H VARGAS  dexamethasone, 10 mg, Intravenous, Once  DULoxetine, 60 mg, Oral, HS  flecainide, 100 mg, Oral, BID  [START ON 3/6/2022] metoprolol succinate, 100 mg, Oral, Daily  tamsulosin, 0 4 mg, Oral, Daily With Dinner      Continuous IV Infusions:     PRN Meds:  acetaminophen, 650 mg, Oral, Q4H PRN - x 1 3/4    Up w/ assist   Neuro checks Every 1 hour x 4 hours, then every 2 hours x 4, then every 4 hours x 72 hours                  Vitals q 4 hr   IP CONSULT TO NEUROLOGY  IP CONSULT TO CASE MANAGEMENT    Network Utilization Review Department  ATTENTION: Please call with any questions or concerns to [phone] and carefully listen to the prompts so that you are directed to the right person  All voicemails are confidential   Carlotta Skipper all requests for admission clinical reviews, approved or denied determinations and any other requests to dedicated fax number below belonging to the campus where the patient is receiving treatment   List of dedicated fax numbers for the Facilities:  [address] DENIALS (Administrative/Medical Necessity) [phone]   [address] (Maternity/NICU/Pediatrics) [phone]   [address] [place] [address] [address] [place] [id number] [facility] Tyra Rodriguez [address] [phone]

## 2022-03-05 NOTE — ASSESSMENT & PLAN NOTE
· Patient follows with cardiology as outpatient  · Currently in NSR  · Rate controlled on metoprolol/flecainide  · On coumadin - discussed with neurosurgery, recommending hold coumadin and trend INR

## 2022-03-05 NOTE — PROGRESS NOTES
[place]     Progress Note - Krunal Bruce 1950, 71 y o  male MRN: [id number]  Unit/Bed#: -01 Encounter: [id number]  Primary Care Provider: Austin Jones MD   Date and time admitted to hospital: 3/4/2022  2:49 PM    * Weakness  Assessment & Plan  Presented to the emergency department with bilateral lower extremity weakness, R > L  Patient also noted intermittent right-sided neck discomfort and associated right arm numbness/tingling, weakened  strength x3 weeks  · CTA head and neck negative for acute abnormalities  · ED attending discussed with Neurology - recommended admission to stroke pathway  · Obtain MRI brain  · CT cervical spine: "Multilevel degenerative changes causing multilevel neural foraminal and spinal canal stenosis as described  Suspect asymmetric soft tissue in the right paraspinal region  Correlate with contrast-enhanced cervical spine MRI recommended to exclude paraspinal soft tissue infection "  · Obtain MRI cervical spine with contrast for further evaluation  · Continue Coumadin - INR therapeutic at 2 00  · Continue Lipitor 40 mg daily  · PT/OT consult  · Await formal neurology evaluation    Ambulatory dysfunction  Assessment & Plan  · Patient presented to the emergency department with difficulty ambulating, has been using a cane  Currently is planning on eventual bilateral knee replacements with orthopedics  · Fall precautions  · PT/OT consult    Paroxysmal [facility])  Assessment & Plan  · Patient follows with cardiology as outpatient  · Currently in NSR  · Rate controlled on metoprolol/flecainide  · Anticoagulated on Coumadin, INR therapeutic 2 00    WILL (obstructive sleep apnea)  Assessment & Plan  · Continue CPAP q h s      Essential hypertension  Assessment & Plan  · Blood pressure stable  · Metoprolol currently on hold to allow for permissive hypertension    Dyslipidemia  Assessment & Plan  · On pravastatin 40 mg daily at home  · Transition to Lipitor 40 mg daily in setting of stroke pathway    VTE Pharmacologic Prophylaxis: VTE Score: 3 Moderate Risk (Score 3-4) - Pharmacological DVT Prophylaxis Ordered: warfarin (Coumadin)  Patient Centered Rounds: I performed bedside rounds with nursing staff today  Discussions with Specialists or Other Care Team Provider: CM  Will discuss with neurology    Education and Discussions with Family / Patient: Updated  (wife) via phone  Time Spent for Care: 30 minutes  More than 50% of total time spent on counseling and coordination of care as described above  Current Length of Stay: 1 day(s)  Current Patient Status: Inpatient   Certification Statement: The patient will continue to require additional inpatient hospital stay due to Neurology evaluation, MRI brain/cervical spine  Discharge Plan: Anticipate discharge in 48-72 hrs to discharge location to be determined pending rehab evaluations  Code Status: Level 3 - DNAR and DNI    Subjective:   Patient reports feeling well this morning  He has improved range of motion of right shoulder flexion  Still with decreased ability to clench his right hand  Notes mild numbness/tingling from the shoulder distally  Denies chest pain/palpitations, shortness of breath, nausea vomiting, abdominal pain  Objective:     Vitals:   Temp (24hrs), Av 1 °F (36 7 °C), Min:97 8 °F (36 6 °C), Max:98 3 °F (36 8 °C)    Temp:  [97 8 °F (36 6 °C)-98 3 °F (36 8 °C)] 98 1 °F (36 7 °C)  HR:  [51-59] 54  Resp:  [16-19] 19  BP: (123-172)/(68-86) 141/68  SpO2:  [98 %-100 %] 99 %  Body mass index is 34 59 kg/m²  Input and Output Summary (last 24 hours): Intake/Output Summary (Last 24 hours) at 3/5/2022 1006  Last data filed at 3/5/2022 0726  Gross per 24 hour   Intake --   Output 700 ml   Net -700 ml       Physical Exam:   Physical Exam  Vitals and nursing note reviewed  Constitutional:       Appearance: He is well-developed        Comments: Appears comfortable, no acute distress   HENT:      Head: Normocephalic and atraumatic  Eyes:      General: No scleral icterus  Extraocular Movements: Extraocular movements intact  Conjunctiva/sclera: Conjunctivae normal    Cardiovascular:      Rate and Rhythm: Normal rate and regular rhythm  Heart sounds: S1 normal and S2 normal  No murmur heard  Pulmonary:      Effort: Pulmonary effort is normal  No respiratory distress  Breath sounds: Normal breath sounds  No wheezing, rhonchi or rales  Abdominal:      General: Bowel sounds are normal       Palpations: Abdomen is soft  Tenderness: There is no abdominal tenderness  There is no guarding or rebound  Musculoskeletal:      Cervical back: Normal range of motion  Comments: Improved active ROM of right shoulder flexion  Appears to also have improved right hand  strength compared to yesterday  Mild right lower extremity weakness when compared to left, 4/5  No extremity edema   Skin:     General: Skin is warm and dry  Neurological:      Mental Status: He is alert and oriented to person, place, and time  Cranial Nerves: Cranial nerves are intact  Sensory: Sensation is intact  Motor: Weakness present     Psychiatric:         Mood and Affect: Mood normal          Speech: Speech normal          Behavior: Behavior normal           Additional Data:     Labs:  Results from last 7 days   Lab Units 03/05/22  0414   WBC Thousand/uL 4 40   HEMOGLOBIN g/dL 13 2   HEMATOCRIT % 40 9   PLATELETS Thousands/uL 189   NEUTROS PCT % 46   LYMPHS PCT % 38   MONOS PCT % 13*   EOS PCT % 2     Results from last 7 days   Lab Units 03/05/22  0414 03/04/22  1454 03/04/22  1454   SODIUM mmol/L 137   < > 136   POTASSIUM mmol/L 3 8   < > 4 2   CHLORIDE mmol/L 102   < > 101   CO2 mmol/L 29   < > 31   BUN mg/dL 12   < > 11   CREATININE mg/dL 0 76   < > 0 76   ANION GAP mmol/L 6   < > 4   CALCIUM mg/dL 8 6   < > 9 2   ALBUMIN g/dL  --   --  4 0   TOTAL BILIRUBIN mg/dL  --   --  0 80   ALK PHOS U/L  --   --  69   ALT U/L  --   --  24   AST U/L  --   --  20   GLUCOSE RANDOM mg/dL 104   < > 98    < > = values in this interval not displayed  Results from last 7 days   Lab Units 03/05/22  0414   INR  2 00*     Results from last 7 days   Lab Units 03/04/22  1520   POC GLUCOSE mg/dl 84               Lines/Drains:  Invasive Devices  Report    Peripheral Intravenous Line            Peripheral IV 03/04/22 Right Antecubital <1 day                  Telemetry:  Telemetry Orders (From admission, onward)             48 Hour Telemetry Monitoring  Continuous x 48 hours        References:    Telemetry Guidelines   Question:  Reason for 48 Hour Telemetry  Answer:  Acute CVA (<24 hrs old, hemispheric strokes, selected brainstem strokes, cardiac arrhythmias)                 Telemetry Reviewed: Normal Sinus Rhythm  Indication for Continued Telemetry Use: Acute CVA             Imaging: Reviewed radiology reports from this admission including: CT cervical spine recon    Recent Cultures (last 7 days):         Last 24 Hours Medication List:   Current Facility-Administered Medications   Medication Dose Route Frequency Provider Last Rate    acetaminophen  650 mg Oral Q4H PRN Inna Rodarte PA-C      atorvastatin  40 mg Oral QPM Inna Rodarte PA-C      celecoxib  200 mg Oral Daily [place], [place]      cephalexin  500 mg Oral [facility] [place]      DULoxetine  60 mg Oral HS Moreno Mac PA-C      flecainide  100 mg Oral BID Inna Rodarte PA-C      tamsulosin  0 4 mg Oral Daily With William Awad PA-C      warfarin  2 5 mg Oral Once per day on Sun Mon Tue Thu Fri Inna Rodarte PA-C      warfarin  5 mg Oral Once per day on Wed Sat Inna Rodarte PA-C          Today, Patient Was Seen By: Inna Rodarte PA-C    **Please Note: This note may have been constructed using a voice recognition system  **

## 2022-03-05 NOTE — ASSESSMENT & PLAN NOTE
Patient reports new onset right UE and LE progressive weakness with no inciting etiology over the last month, quicker progression over the last week necessitating using a cane and falls  - Prior to a month ago states was ambulating without any assistive device and no falls  - He does have chronic bl knee arthritis pending knee surgery but states this current weakness is new- not associated with his knee pain  - Also reporting saddle anesthesia new over the last week and increasing urinary urgency  -     - MRI C spine reviewed with C5-6 severe spinal stenosis  - Neurologic exam with 4+/5 RUE and 5/5 LE and 3/5 RLE and 4/5 LLE, Right - Jiménez and right ankle clonus sustained  - Discussed case with neurosurgery- Justin Santacruz and recommendation is to transfer to [facility] given rather quick decline for neurosurgical evaluation and potentially surgical intervention    - Given that he is on coumadin will need to bridged with heparin  - Start decadron 10 IV followed by 4 mg q6h then slowly titrate downward  - Discussed with patient and wife, patient prefers going home then following up in office within the week and then OR when neurosurgery can schedule him    - Discussed case with patient's wife also

## 2022-03-05 NOTE — EMTALA/ACUTE CARE TRANSFER
[facility] MED SURG UNIT  [address]   [place] [address]  Dept: [phone]      ACUTE CARE TRANSFER CONSENT    NAME Wanda Brewer                                         1950                              MRN [id number]    I have been informed of my rights regarding examination, treatment, and transfer   by Dr Stalin Godoy MD    Benefits:  Specialty unavailable at current campus - Neurosurgery    Risks:  Clinical decompensation      Consent for Transfer:  I acknowledge that my medical condition has been evaluated and explained to me by the treating physician or other qualified medical person and/or my attending physician, who has recommended that I be transferred to the service of    at    The above potential benefits of such transfer, the potential risks associated with such transfer, and the probable risks of not being transferred have been explained to me, and I fully understand them  The doctor has explained that, in my case, the benefits of transfer outweigh the risks  I agree to be transferred  I authorize the performance of emergency medical procedures and treatments upon me in both transit and upon arrival at the receiving facility  Additionally, I authorize the release of any and all medical records to the receiving facility and request they be transported with me, if possible  I understand that the safest mode of transportation during a medical emergency is an ambulance and that the Hospital advocates the use of this mode of transport  Risks of traveling to the receiving facility by car, including absence of medical control, life sustaining equipment, such as oxygen, and medical personnel has been explained to me and I fully understand them  (JANIE CORRECT BOX BELOW)  [  ]  I consent to the stated transfer and to be transported by ambulance/helicopter    [  ]  I consent to the stated transfer, but refuse transportation by ambulance and accept full responsibility for my transportation by car  I understand the risks of non-ambulance transfers and I exonerate the Hospital and its staff from any deterioration in my condition that results from this refusal     X___________________________________________    DATE  22  TIME________  Signature of patient or legally responsible individual signing on patient behalf           RELATIONSHIP TO PATIENT_________________________          Provider Certification    NAME Reyes Dash                                         1950                              MRN [id number]    A medical screening exam was performed on the above named patient  Based on the examination:    Condition Necessitating Transfer Severe cervical canal stenosis    Patient Condition:  Stable    Reason for Transfer:  Specialty unavailable - neurosurgery    Transfer Requirements: Facility     · Space available and qualified personnel available for treatment as acknowledged by    · Agreed to accept transfer and to provide appropriate medical treatment as acknowledged by          · Appropriate medical records of the examination and treatment of the patient are provided at the time of transfer   [address] _______  · Transfer will be performed by qualified personnel from    and appropriate transfer equipment as required, including the use of necessary and appropriate life support measures      Provider Certification: I have examined the patient and explained the following risks and benefits of being transferred/refusing transfer to the patient/family:         Based on these reasonable risks and benefits to the patient and/or the unborn child(emperatriz), and based upon the information available at the time of the patients examination, I certify that the medical benefits reasonably to be expected from the provision of appropriate medical treatments at another medical facility outweigh the increasing risks, if any, to the individuals medical condition, and in the case of labor to the unborn child, from effecting the transfer      X_____Frances Alcazar PA-C____________ DATE 03/05/22        TIME_1600__      ORIGINAL - SEND TO MEDICAL RECORDS   COPY - SEND WITH PATIENT DURING TRANSFER

## 2022-03-05 NOTE — TELEMEDICINE
On-Call Telephone Note    Contacted by Dr Zachariah Nunez Malcom 71 y o  male MRN: [id number]  Unit/Bed#: -01 Encounter: [id number]    Per provider report, patient presents with progressive difficulties with gait and balance  Available past medical history,social history, surgical history, medication list, drug allergies and review of systems were reviewed  /77   Pulse (!) 54   Temp 97 9 °F (36 6 °C)   Resp 18   Ht 5' 11" (1 803 m)   Wt 112 kg (248 lb)   SpO2 93%   BMI 34 59 kg/m²      Clinical exam per provider report, grade 4/5 power in right upper extremity and 3/5 right lower extremity  Brisk reflexes with positive clonus and Manohar sign  Imaging personally reviewed  MRI of the cervical spine without contrast dated March 5th, 2022 is personally reviewed  Normal cervical lordosis  Degenerative changes throughout the cervical spine  Multiple levels of foraminal stenosis  At C3-4 there is mild to moderate central canal stenosis without cord signal change  At C5-6 there is moderate to severe central canal stenosis with possible mild cord signal change  Exam is somewhat motion degraded  No other areas of significant neural compression or noted  Visualized posterior fossa structures are unremarkable  Assessment and Plan  3 67-year-old gentleman with 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 under Internal Medicine for neurosurgical consultation and intervention during the admission  Apparently, the patient may be considering going home  If so, we will try to arrange for early follow-up this week in the office to discuss surgical intervention  In the interim could be started on a Decadron tapering dose    3  Patient should be educated to avoid activities that result in extreme range of motion of the cervical spine as this could lead to further neurological decline spinal cord injury  Patient should also return to the emergency department with progressive symptoms  4  Patient is on Coumadin for factor 5 [place]  This would need to be held prior to any surgical intervention, but could be we started shortly after intervention pending progress  All questions answered  Provider is in agreement with the course of action  A total of 10 minutes was spent discussing the presentation, physical exam and formulating a management plan with the provider

## 2022-03-05 NOTE — ASSESSMENT & PLAN NOTE
Presented to the emergency department with bilateral lower extremity weakness, R > L  Patient also noted intermittent right-sided neck discomfort and associated right arm numbness/tingling, weakened  strength x3 weeks  · CTA head and neck negative for acute abnormalities  · ED attending discussed with Neurology  · MRI brain negative acute CVA  · CT cervical spine: "Multilevel degenerative changes causing multilevel neural foraminal and spinal canal stenosis as described  Suspect asymmetric soft tissue in the right paraspinal region  Correlate with contrast-enhanced cervical spine MRI recommended to exclude paraspinal soft tissue infection "  · Obtain MRI cervical spine: "No acute abnormality  Motion limited    Multilevel degenerative spondylosis progressed from 2013 most severe at C5-6 with severe canal stenosis and flattening of the cord  Small focus of T2 signal within the cord at C5-C6 may represent myelomalacia  Moderate canal stenosis at C3-4 mildly indents the cord without signal abnormality  Marked multilevel foraminal stenosis  No prevertebral edema or mass "  · Discussed with neurosurgery Dr Ross Brown; recommending transfer to [facility] with neurosurgery consult for surgical intervention  Recommended holding coumadin for now  Surgical intervention timing TBD  Trend INR daily    · Start IV decadron 10 mg now  · Patient and patient's wife agreeable to transfer to \[facility]\""

## 2022-03-05 NOTE — ASSESSMENT & PLAN NOTE
Presented to the emergency department with bilateral lower extremity weakness, R > L  Patient also noted intermittent right-sided neck discomfort and associated right arm numbness/tingling, weakened  strength x3 weeks  · CTA head and neck negative for acute abnormalities  · ED attending discussed with Neurology - recommended admission to stroke pathway  · Obtain MRI brain  · CT cervical spine: "Multilevel degenerative changes causing multilevel neural foraminal and spinal canal stenosis as described  Suspect asymmetric soft tissue in the right paraspinal region   Correlate with contrast-enhanced cervical spine MRI recommended to exclude paraspinal soft tissue infection "  · Obtain MRI cervical spine with contrast for further evaluation  · Continue Coumadin - INR therapeutic at 2 00  · Continue Lipitor 40 mg daily  · PT/OT consult  · Await formal neurology evaluation

## 2022-03-05 NOTE — DISCHARGE SUMMARY
[facility]     Discharge- Eli Huffman 1950, 71 y o  male MRN: [id number]  Unit/Bed#: -01 Encounter: [id number]  Primary Care Provider: Vivian Rdz MD   Date and time admitted to hospital: 3/4/2022  2:49 PM    * Cervical myelopathy [facility])  Assessment & Plan  Presented to the emergency department with bilateral lower extremity weakness, R > L  Patient also noted intermittent right-sided neck discomfort and associated right arm numbness/tingling, weakened  strength x3 weeks  · CTA head and neck negative for acute abnormalities  · ED attending discussed with Neurology  · MRI brain negative acute CVA  · CT cervical spine: "Multilevel degenerative changes causing multilevel neural foraminal and spinal canal stenosis as described  Suspect asymmetric soft tissue in the right paraspinal region  Correlate with contrast-enhanced cervical spine MRI recommended to exclude paraspinal soft tissue infection "  · Obtain MRI cervical spine: "No acute abnormality  Motion limited    Multilevel degenerative spondylosis progressed from 2013 most severe at C5-6 with severe canal stenosis and flattening of the cord  Small focus of T2 signal within the cord at C5-C6 may represent myelomalacia  Moderate canal stenosis at C3-4 mildly indents the cord without signal abnormality  Marked multilevel foraminal stenosis  No prevertebral edema or mass "  · Discussed with neurosurgery Dr Yi Fu; recommending transfer to [facility] with neurosurgery consult for surgical intervention  Recommended holding coumadin for now  Surgical intervention timing TBD  Trend INR daily  · Start IV decadron 10 mg now  · Patient and patient's wife agreeable to transfer to [facility]    Ambulatory dysfunction  Assessment & Plan  · Patient presented to the emergency department with difficulty ambulating, has been using a cane    Currently is planning on eventual bilateral knee replacements with orthopedics  · Likely in setting of severe cervical canal stenosis  · Fall precautions  · PT/OT consult    Paroxysmal A-fib [facility])  Assessment & Plan  · Patient follows with cardiology as outpatient  · Currently in NSR  · Rate controlled on metoprolol/flecainide  · On coumadin - discussed with neurosurgery, recommending hold coumadin and trend INR    WILL (obstructive sleep apnea)  Assessment & Plan  · Continue CPAP q h s  Essential hypertension  Assessment & Plan  · Blood pressure stable  · Stroke pathway discontinued - resume metoprolol    Dyslipidemia  Assessment & Plan  · On pravastatin 40 mg daily at home    Medical Problems             Resolved Problems  Date Reviewed: 3/5/2022    None              Discharging Physician / Practitioner: Jc Avilez PA-C  PCP: Massimo Staples MD  Admission Date:   Admission Orders (From admission, onward)     Ordered        03/04/22 1659  Inpatient Admission  Once                      Discharge Date: 03/05/22    Consultations During Hospital Stay:  · Neurology  · Discussed with neurosurgery    Procedures Performed:   · None    Significant Findings / Test Results:   · CTA head and neck: "No evidence of acute intracranial hemorrhage  No evidence of occlusive disease "  · CT recon cervical spine: "Multilevel degenerative changes causing multilevel neural foraminal and spinal canal stenosis as described  Suspect asymmetric soft tissue in the right paraspinal region  Correlate with contrast-enhanced cervical spine MRI recommended to exclude paraspinal soft tissue infection "  · MRI brain: "No acute intracranial pathology  Mild chronic microangiopathy "  · MRI cervical spine: "No acute abnormality  Motion limited    Multilevel degenerative spondylosis progressed from 2013 most severe at C5-6 with severe canal stenosis and flattening of the cord  Small focus of T2 signal within the cord at C5-C6 may represent myelomalacia   Moderate canal stenosis at C3-4 mildly indents the cord without signal abnormality  Marked multilevel foraminal stenosis  No prevertebral edema or mass "    Incidental Findings:   · As above     Test Results Pending at Discharge (will require follow up): · None     Outpatient Tests Requested:  · None    Complications:  None    Reason for Admission:  Right-sided weakness    Hospital Course:   Alexandria Bernard is a 71 y o  male patient who originally presented to the hospital on 3/4/2022 due to right-sided weakness, ambulatory dysfunction  Past medical history significant for essential hypertension, osteoarthritis, obstructive sleep apnea, paroxysmal AFib  Patient presented to the emergency department with several day history of right-sided weakness, ambulatory dysfunction  Patient was initially admitted under stroke pathway  CTA head and neck unremarkable on admission  Given concern for cervical involvement CT recon of the cervical spine was obtained which revealed multilevel degenerative changes and concern for possible paraspinal soft tissue collection  MRI brain was negative for acute CVA and MRI of the cervical spine showed severe cervical canal stenosis  Case was discussed with Neurology and Neurosurgery  Patient was started on IV Decadron and recommended for transfer to [facility] for neurosurgery evaluation and surgical intervention  Patient and wife agreeable to transfer  Neurosurgery recommended holding Coumadin  Patient hemodynamically stable on day of discharge for transfer to [facility]  Please see above list of diagnoses and related plan for additional information  Condition at Discharge: stable    Discharge Day Visit / Exam:   * Please refer to separate progress note for these details *    Discussion with Family: Updated  (wife) via phone  Discharge instructions/Information to patient and family:   See after visit summary for information provided to patient and family        Provisions for Follow-Up Care:  See after visit summary for information related to follow-up care and any pertinent home health orders  Disposition:   [address] Transfer to \[facility]"" - Dr Una Haskins    Planned Readmission: Yes - B Dr Una Haskins     Discharge Statement:  I spent 70 minutes discharging the patient  This time was spent on the day of discharge  I had direct contact with the patient on the day of discharge  Greater than 50% of the total time was spent examining patient, answering all patient questions, arranging and discussing plan of care with patient as well as directly providing post-discharge instructions  Additional time then spent on discharge activities  Discharge Medications:  See after visit summary for reconciled discharge medications provided to patient and/or family        **Please Note: This note may have been constructed using a voice recognition system**

## 2022-03-05 NOTE — CONSULTS
Consultation - Neurology   Jonel Delong 71 y o  male MRN: [id number]  Unit/Bed#: -01 Encounter: [id number]      Assessment/Plan     * Cervical myelopathy [facility])  Assessment & Plan  Patient reports new onset right UE and LE progressive weakness with no inciting etiology over the last month, quicker progression over the last week necessitating using a cane and falls  - Prior to a month ago states was ambulating without any assistive device and no falls  - He does have chronic bl knee arthritis pending knee surgery but states this current weakness is new- not associated with his knee pain  - Also reporting saddle anesthesia new over the last week and increasing urinary urgency  -    - MRI C spine reviewed with C5-6 severe spinal stenosis  - Neurologic exam with 4+/5 RUE and 5/5 LE and 3/5 RLE and 4/5 LLE, Right - Jiménez and right ankle clonus sustained  - Discussed case with neurosurgery- Dana Shay and recommendation is to transfer to \[facility]\"" given rather quick decline for neurosurgical evaluation and potentially surgical intervention    - Given that he is on coumadin will need to bridged with heparin  - Start decadron 10 IV followed by 4 mg q6h then slowly titrate downward  - Discussed with patient and wife, patient prefers going home then following up in office within the week and then OR when neurosurgery can schedule him  - Discussed case with patient's wife also    I discussed fall precautions extensively with patient including using walker at all times, not walking in the dark, avoiding stairs or uneven surfaces, not walking extensive long distances not lifting anything heavy using shower chairs or both grab bars for shower and avoiding activities with high risk of cervical flexion-extension to prevent further quick decline      If patient has any acute worsening of symptoms including significant weakness that is new, worsening saddle anesthesia, urinary incontinence (or retention), he should come back to the ER immediately    MRI brain not acute  Okay to Cancel stroke protocol    Follow up with neurosurgery within the week  Discussed case with primary team NILTON  Reyes Faraz    History of Present Illness     Reason for Consult / Principal Problem: right sided progressive weakness    HPI: Reyes Dash is a 71 y o  ambidextrous male with factor 5 Leiden, paroxysmal atrial fibrillation-on Coumadin INR 2, obstructive sleep apnea, bilateral knee osteoarthritis, remote history low back surgery about 2 decades ago with no significant sequelae afterward per patient, who presents with progressive worsening of right upper extremity and right lower extremity weakness and progressive ambulatory dysfunction over the last 3-4 weeks with no inciting etiology  Patient states over the last week it has been significantly worse for no clear reason  He also reports on occasion losing bladder control due to increased urinary urgency  He reports some loss of sensation while urination that is new over the last week  He reports that over the last week he has fallen due to loss of balance and is needing to use a cane  Denies needing to do this prior to a month ago or even 2 weeks ago states is able to ambulate without a cane at all times  He denies any significant recent trauma  States otherwise feels fine  Does not feel ill  No chest pain, shortness of breath, fevers, chills, cough, no malaise, no GI symptoms  States he is vaccinated for COVID    Is currently tentatively scheduled for knee surgery early May per Orthopedics per him    MRI brain not acute    Inpatient consult to Neurology  Consult performed by: Ya Smith DO  Consult ordered by: Alexis Ortiz PA-C          Review of Systems 10 point review of systems completed negative other than that noted above    Historical Information   Past Medical History:   Diagnosis Date    Acute venous embolism and thrombosis of deep vessels of proximal lower extremity [facility] [place] [facility]     Last assessed: 5/18/15    Arthritis     Cellulitis     LE    CPAP (continuous positive airway pressure) dependence     Factor V Leiden (HCC)     Forgetfulness     [place] (hard of hearing)     Paroxysmal atrial fibrillation (HCC)     Last assessed: 11/2/15    Sleep apnea      Past Surgical History:   Procedure Laterality Date    BACK SURGERY      Lower    COLON SURGERY      COLONOSCOPY       Social History   Social History     Substance and Sexual Activity   Alcohol Use Not Currently     Social History     Substance and Sexual Activity   Drug Use No     E-Cigarette/Vaping    E-Cigarette Use Never User      E-Cigarette/Vaping Substances     Social History     Tobacco Use   Smoking Status Never Smoker   Smokeless Tobacco Never Used     Family History:   Family History   Problem Relation Age of Onset    Lung cancer Mother     No Known Problems Father     Heart attack Brother     Atrial fibrillation Brother     Emphysema Sister        Review of previous medical records was  completed  Meds/Allergies   all current active meds have been reviewed    Allergies   Allergen Reactions    Morphine GI Intolerance    Vitamin K Other (See Comments)     On coumadin-patient sensitive to vitamin K amounts in meds etc        Objective   Vitals:Blood pressure 141/68, pulse (!) 54, temperature 98 1 °F (36 7 °C), resp  rate 19, height 5' 11" (1 803 m), weight 112 kg (248 lb), SpO2 93 %  ,Body mass index is 34 59 kg/m²  Intake/Output Summary (Last 24 hours) at 3/5/2022 1417  Last data filed at 3/5/2022 1128  Gross per 24 hour   Intake 600 ml   Output 1050 ml   Net -450 ml       Invasive Devices:    Invasive Devices  Report    Peripheral Intravenous Line            Peripheral IV 03/04/22 Right Antecubital <1 day                Physical Exam  Neurologic Exam     Gen: NAD  HEENT: NCAT, EOMI  Resp: Symmetric chest rise bl  CVS: RRR  Ext: no edema    AO X 4 no aphasia or dysarthria good insight into situation  CN 2-12 grossly intact  Motor: 4+/5 right biceps and 5/5 right triceps, elbow flexion extension 5/5 and right APB and hand grasp 4/5  Mild atrophy noted of right APB  LUE biceps triceps elbow flexion extension and hand grasp 5/5   3/5 RLE and 4/5 LLE,   Reflexes: 3/4 bl patellar reflexes, 4/4 achilles reflex right- and 2/4 left achilles,Right - Jiménez and right ankle clonus sustained  Sensation: Reduced sensation RUE proximal and distal including trapezius mm region, RLE reduced below knee intact sensation bl LE otherwise  Cerebellar: No dysmetria b/l  Gait: deferred due to fall risk        Lab Results: I have personally reviewed pertinent reports  Imaging Studies: I have personally reviewed pertinent films in PACS  EKG, Pathology, and Other Studies: I have personally reviewed pertinent reports  VTE Prophylaxis: Warfarin (Coumadin)    Code Status: Level 3 - DNAR and DNI  Advance Directive and Living Will: Yes    Power of :    POLST:      Counseling / Coordination of Care  Total time spent with direct patient care, chart review, imaging review including personal review of MRI in PACS and explaining cervical myelopathy to the patient and family and risk if not quickly intervened upon including progressive paralysis of bilateral upper extremities and lower extremities at least 60 minutes today

## 2022-03-05 NOTE — ASSESSMENT & PLAN NOTE
· Patient follows with cardiology as outpatient  · Currently in NSR  · Rate controlled on metoprolol/flecainide  · Anticoagulated on Coumadin, INR therapeutic 2 00

## 2022-03-05 NOTE — PLAN OF CARE
Problem: Potential for Falls  Goal: Patient will remain free of falls  Description: INTERVENTIONS:  - Educate patient/family on patient safety including physical limitations  - Instruct patient to call for assistance with activity   - Consult OT/PT to assist with strengthening/mobility   - Keep Call bell within reach  - Keep bed low and locked with side rails adjusted as appropriate  - Keep care items and personal belongings within reach  - Initiate and maintain comfort rounds  - Make Fall Risk Sign visible to staff  - Offer Toileting every  Hours, in advance of need  - Initiate/Maintain alarm  - Obtain necessary fall risk management equipment:   - Apply yellow socks and bracelet for high fall risk patients  - Consider moving patient to room near nurses station  Outcome: Progressing     Problem: PAIN - ADULT  Goal: Verbalizes/displays adequate comfort level or baseline comfort level  Description: Interventions:  - Encourage patient to monitor pain and request assistance  - Assess pain using appropriate pain scale  - Administer analgesics based on type and severity of pain and evaluate response  - Implement non-pharmacological measures as appropriate and evaluate response  - Consider cultural and social influences on pain and pain management  - Notify physician/advanced practitioner if interventions unsuccessful or patient reports new pain  Outcome: Progressing     Problem: INFECTION - ADULT  Goal: Absence or prevention of progression during hospitalization  Description: INTERVENTIONS:  - Assess and monitor for signs and symptoms of infection  - Monitor lab/diagnostic results  - Monitor all insertion sites, i e  indwelling lines, tubes, and drains  - Monitor endotracheal if appropriate and nasal secretions for changes in amount and color  - [place] appropriate cooling/warming therapies per order  - Administer medications as ordered  - Instruct and encourage patient and family to use good hand hygiene technique  - Identify and instruct in appropriate isolation precautions for identified infection/condition  Outcome: Progressing     Problem: SAFETY ADULT  Goal: Patient will remain free of falls  Description: INTERVENTIONS:  - Educate patient/family on patient safety including physical limitations  - Instruct patient to call for assistance with activity   - Consult OT/PT to assist with strengthening/mobility   - Keep Call bell within reach  - Keep bed low and locked with side rails adjusted as appropriate  - Keep care items and personal belongings within reach  - Initiate and maintain comfort rounds  - Make Fall Risk Sign visible to staff  - Offer Toileting every  Hours, in advance of need  - Initiate/Maintain alarm  - Obtain necessary fall risk management equipment:   - Apply yellow socks and bracelet for high fall risk patients  - Consider moving patient to room near nurses station  Outcome: Progressing  Goal: Maintain or return to baseline ADL function  Description: INTERVENTIONS:  -  Assess patient's ability to carry out ADLs; assess patient's baseline for ADL function and identify physical deficits which impact ability to perform ADLs (bathing, care of mouth/teeth, toileting, grooming, dressing, etc )  - Assess/evaluate cause of self-care deficits   - Assess range of motion  - Assess patient's mobility; develop plan if impaired  - Assess patient's need for assistive devices and provide as appropriate  - Encourage maximum independence but intervene and supervise when necessary  - Involve family in performance of ADLs  - Assess for home care needs following discharge   - Consider OT consult to assist with ADL evaluation and planning for discharge  - Provide patient education as appropriate  Outcome: Progressing  Goal: Maintains/Returns to pre admission functional level  Description: INTERVENTIONS:  - Perform BMAT or MOVE assessment daily    - Set and communicate daily mobility goal to care team and patient/family/caregiver  - Collaborate with rehabilitation services on mobility goals if consulted  - Perform Range of Motion  times a day  - Reposition patient every  hours  - Dangle patient  times a day  - Stand patient  times a day  - Ambulate patient  times a day  - Out of bed to chair  times a day   - Out of bed for meals  times a day  - Out of bed for toileting  - Record patient progress and toleration of activity level   Outcome: Progressing     Problem: DISCHARGE PLANNING  Goal: Discharge to home or other facility with appropriate resources  Description: INTERVENTIONS:  - Identify barriers to discharge w/patient and caregiver  - Arrange for needed discharge resources and transportation as appropriate  - Identify discharge learning needs (meds, wound care, etc )  - Arrange for interpretive services to assist at discharge as needed  - Refer to Case Management Department for coordinating discharge planning if the patient needs post-hospital services based on physician/advanced practitioner order or complex needs related to functional status, cognitive ability, or social support system  Outcome: Progressing     Problem: Knowledge Deficit  Goal: Patient/family/caregiver demonstrates understanding of disease process, treatment plan, medications, and discharge instructions  Description: Complete learning assessment and assess knowledge base    Interventions:  - Provide teaching at level of understanding  - Provide teaching via preferred learning methods  Outcome: Progressing     Problem: Prexisting or High Potential for Compromised Skin Integrity  Goal: Skin integrity is maintained or improved  Description: INTERVENTIONS:  - Identify patients at risk for skin breakdown  - Assess and monitor skin integrity  - Assess and monitor nutrition and hydration status  - Monitor labs   - Assess for incontinence   - Turn and reposition patient  - Assist with mobility/ambulation  - Relieve pressure over bony prominences  - Avoid friction and shearing  - Provide appropriate hygiene as needed including keeping skin clean and dry  - Evaluate need for skin moisturizer/barrier cream  - Collaborate with interdisciplinary team   - Patient/family teaching  - Consider wound care consult   Outcome: Progressing     Problem: MOBILITY - ADULT  Goal: Maintain or return to baseline ADL function  Description: INTERVENTIONS:  -  Assess patient's ability to carry out ADLs; assess patient's baseline for ADL function and identify physical deficits which impact ability to perform ADLs (bathing, care of mouth/teeth, toileting, grooming, dressing, etc )  - Assess/evaluate cause of self-care deficits   - Assess range of motion  - Assess patient's mobility; develop plan if impaired  - Assess patient's need for assistive devices and provide as appropriate  - Encourage maximum independence but intervene and supervise when necessary  - Involve family in performance of ADLs  - Assess for home care needs following discharge   - Consider OT consult to assist with ADL evaluation and planning for discharge  - Provide patient education as appropriate  Outcome: Progressing  Goal: Maintains/Returns to pre admission functional level  Description: INTERVENTIONS:  - Perform BMAT or MOVE assessment daily    - Set and communicate daily mobility goal to care team and patient/family/caregiver  - Collaborate with rehabilitation services on mobility goals if consulted  - Perform Range of Motion  times a day  - Reposition patient every  hours    - Dangle patient  times a day  - Stand patient  times a day  - Ambulate patient  times a day  - Out of bed to chair  times a day   - Out of bed for fay times a day  - Out of bed for toileting  - Record patient progress and toleration of activity level   Outcome: Progressing

## 2022-03-05 NOTE — ASSESSMENT & PLAN NOTE
· Patient presented to the emergency department with difficulty ambulating, has been using a cane    Currently is planning on eventual bilateral knee replacements with orthopedics  · Likely in setting of severe cervical canal stenosis  · Fall precautions  · PT/OT consult

## 2022-03-06 LAB
ANION GAP SERPL CALCULATED.3IONS-SCNC: 5 MMOL/L (ref 4–13)
APTT PPP: 101 SECONDS (ref 23–37)
APTT PPP: 31 SECONDS (ref 23–37)
BASOPHILS # BLD AUTO: 0.02 THOUSANDS/ΜL (ref 0–0.1)
BASOPHILS NFR BLD AUTO: 1 % (ref 0–1)
BUN SERPL-MCNC: 12 MG/DL (ref 5–25)
CALCIUM SERPL-MCNC: 9.1 MG/DL (ref 8.3–10.1)
CHLORIDE SERPL-SCNC: 105 MMOL/L (ref 100–108)
CO2 SERPL-SCNC: 27 MMOL/L (ref 21–32)
CREAT SERPL-MCNC: 0.66 MG/DL (ref 0.6–1.3)
EOSINOPHIL # BLD AUTO: 0.07 THOUSAND/ΜL (ref 0–0.61)
EOSINOPHIL NFR BLD AUTO: 2 % (ref 0–6)
ERYTHROCYTE [DISTWIDTH] IN BLOOD BY AUTOMATED COUNT: 12.5 % (ref 11.6–15.1)
ERYTHROCYTE [DISTWIDTH] IN BLOOD BY AUTOMATED COUNT: 12.8 % (ref 11.6–15.1)
GFR SERPL CREATININE-BSD FRML MDRD: 97 ML/MIN/1.73SQ M
GLUCOSE SERPL-MCNC: 123 MG/DL (ref 65–140)
HCT VFR BLD AUTO: 40.2 % (ref 36.5–49.3)
HCT VFR BLD AUTO: 42.5 % (ref 36.5–49.3)
HGB BLD-MCNC: 14 G/DL (ref 12–17)
HGB BLD-MCNC: 14.4 G/DL (ref 12–17)
IMM GRANULOCYTES # BLD AUTO: 0.02 THOUSAND/UL (ref 0–0.2)
IMM GRANULOCYTES NFR BLD AUTO: 1 % (ref 0–2)
INR PPP: 1.83 (ref 0.84–1.19)
INR PPP: 1.96 (ref 0.84–1.19)
LYMPHOCYTES # BLD AUTO: 1.62 THOUSANDS/ΜL (ref 0.6–4.47)
LYMPHOCYTES NFR BLD AUTO: 38 % (ref 14–44)
MCH RBC QN AUTO: 30.3 PG (ref 26.8–34.3)
MCH RBC QN AUTO: 30.7 PG (ref 26.8–34.3)
MCHC RBC AUTO-ENTMCNC: 33.9 G/DL (ref 31.4–37.4)
MCHC RBC AUTO-ENTMCNC: 34.8 G/DL (ref 31.4–37.4)
MCV RBC AUTO: 88 FL (ref 82–98)
MCV RBC AUTO: 89 FL (ref 82–98)
MONOCYTES # BLD AUTO: 0.53 THOUSAND/ΜL (ref 0.17–1.22)
MONOCYTES NFR BLD AUTO: 12 % (ref 4–12)
NEUTROPHILS # BLD AUTO: 2.03 THOUSANDS/ΜL (ref 1.85–7.62)
NEUTS SEG NFR BLD AUTO: 46 % (ref 43–75)
NRBC BLD AUTO-RTO: 0 /100 WBCS
PLATELET # BLD AUTO: 191 THOUSANDS/UL (ref 149–390)
PLATELET # BLD AUTO: 206 THOUSANDS/UL (ref 149–390)
PMV BLD AUTO: 10.1 FL (ref 8.9–12.7)
PMV BLD AUTO: 9.4 FL (ref 8.9–12.7)
POTASSIUM SERPL-SCNC: 3.4 MMOL/L (ref 3.5–5.3)
PROTHROMBIN TIME: 20.3 SECONDS (ref 11.6–14.5)
PROTHROMBIN TIME: 21.4 SECONDS (ref 11.6–14.5)
RBC # BLD AUTO: 4.56 MILLION/UL (ref 3.88–5.62)
RBC # BLD AUTO: 4.76 MILLION/UL (ref 3.88–5.62)
SODIUM SERPL-SCNC: 137 MMOL/L (ref 136–145)
WBC # BLD AUTO: 4.29 THOUSAND/UL (ref 4.31–10.16)
WBC # BLD AUTO: 4.32 THOUSAND/UL (ref 4.31–10.16)

## 2022-03-06 PROCEDURE — 80048 BASIC METABOLIC PNL TOTAL CA: CPT | Performed by: HOSPITALIST

## 2022-03-06 PROCEDURE — 85610 PROTHROMBIN TIME: CPT | Performed by: HOSPITALIST

## 2022-03-06 PROCEDURE — 85027 COMPLETE CBC AUTOMATED: CPT | Performed by: INTERNAL MEDICINE

## 2022-03-06 PROCEDURE — 85025 COMPLETE CBC W/AUTO DIFF WBC: CPT | Performed by: HOSPITALIST

## 2022-03-06 PROCEDURE — 85730 THROMBOPLASTIN TIME PARTIAL: CPT | Performed by: INTERNAL MEDICINE

## 2022-03-06 PROCEDURE — 99223 1ST HOSP IP/OBS HIGH 75: CPT | Performed by: NEUROLOGICAL SURGERY

## 2022-03-06 PROCEDURE — 99232 SBSQ HOSP IP/OBS MODERATE 35: CPT | Performed by: INTERNAL MEDICINE

## 2022-03-06 PROCEDURE — 85610 PROTHROMBIN TIME: CPT | Performed by: INTERNAL MEDICINE

## 2022-03-06 RX ORDER — POTASSIUM CHLORIDE 20 MEQ/1
20 TABLET, EXTENDED RELEASE ORAL ONCE
Status: COMPLETED | OUTPATIENT
Start: 2022-03-06 | End: 2022-03-06

## 2022-03-06 RX ADMIN — FLECAINIDE ACETATE 100 MG: 100 TABLET ORAL at 09:54

## 2022-03-06 RX ADMIN — FLECAINIDE ACETATE 100 MG: 100 TABLET ORAL at 16:58

## 2022-03-06 RX ADMIN — DULOXETINE HYDROCHLORIDE 60 MG: 60 CAPSULE, DELAYED RELEASE ORAL at 21:21

## 2022-03-06 RX ADMIN — ATORVASTATIN CALCIUM 40 MG: 40 TABLET, FILM COATED ORAL at 16:58

## 2022-03-06 RX ADMIN — HEPARIN SODIUM 18 UNITS/KG/HR: 10000 INJECTION, SOLUTION INTRAVENOUS; SUBCUTANEOUS at 13:59

## 2022-03-06 RX ADMIN — METOPROLOL SUCCINATE 100 MG: 100 TABLET, EXTENDED RELEASE ORAL at 09:53

## 2022-03-06 RX ADMIN — SODIUM CHLORIDE 75 ML/HR: 9 INJECTION, SOLUTION INTRAVENOUS at 09:47

## 2022-03-06 RX ADMIN — DEXAMETHASONE SODIUM PHOSPHATE 10 MG: 4 INJECTION INTRA-ARTICULAR; INTRALESIONAL; INTRAMUSCULAR; INTRAVENOUS; SOFT TISSUE at 09:53

## 2022-03-06 RX ADMIN — POTASSIUM CHLORIDE 20 MEQ: 1500 TABLET, EXTENDED RELEASE ORAL at 09:53

## 2022-03-06 RX ADMIN — TAMSULOSIN HYDROCHLORIDE 0.4 MG: 0.4 CAPSULE ORAL at 16:58

## 2022-03-06 NOTE — H&P
[address] [facility]  H&P- Wanda Brewer 1950, 71 y o  male MRN: [id number]  Unit/Bed#: -01 Encounter: [id number]  Primary Care Provider: Julio Gaines MD   Date and time admitted to hospital: 3/5/2022  8:18 PM    * Cervical myelopathy [facility])  Assessment & Plan  Patient presented with bilateral lower extremity weakness and right-sided neck discomfort right arm numbness tingling x 3 weeks  CTA head and neck, MRI of the brain wwerte unremarkable for acute pathology  CT cervical spine: "Multilevel degenerative changes causing multilevel neural foraminal and spinal canal stenosis as described  Suspect asymmetric soft tissue in the right paraspinal region  Correlate with contrast-enhanced cervical spine MRI recommended to exclude paraspinal soft tissue infection "  MRI cervical spine: "No acute abnormality  Motion limited    Multilevel degenerative spondylosis progressed from 2013 most severe at C5-6 with severe canal stenosis and flattening of the cord  Small focus of T2 signal within the cord at C5-C6 may represent myelomalacia  Moderate canal stenosis at C3-4 mildly indents the cord without signal abnormality  Marked multilevel foraminal stenosis  No prevertebral edema or mass "  According to transferring notes " Discussed with neurosurgery Dr Ronan Clemons; recommending transfer to [facility] under [facility] with neurosurgery consult for surgical intervention  Recommended holding coumadin for now  Surgical intervention timing TBD  Trend INR daily "  Continue Decadron 10 mg IV daily  Fall precaution        Ambulatory dysfunction  Assessment & Plan  Secondary to knee osteoarthritis and #1    Management as above  PT OT evaluate     Mixed hyperlipidemia  Assessment & Plan  Continue statin    Paroxysmal A-fib (HCC)  Assessment & Plan  Continue heart rate control medications  Hold warfarin as per Neurosurgery recommendation    WILL (obstructive sleep apnea)  Assessment & Plan  Continue CPAP at night    Essential hypertension  Assessment & Plan  Continue home blood pressure meds with holding parameters    VTE Pharmacologic Prophylaxis: VTE Score: 3 High Risk (Score >/= 5) - Pharmacological DVT Prophylaxis Contraindicated  Sequential Compression Devices Ordered  Code Status: Level 3 - DNAR and DNI       Anticipated Length of Stay: Patient will be admitted on an inpatient basis with an anticipated length of stay of greater than 2 midnights secondary to IV Decadron neurosurgery consult  Total Time for Visit, including Counseling / Coordination of Care: 45 minutes Greater than 50% of this total time spent on direct patient counseling and coordination of care  Chief Complaint:  Ambulatory dysfunction    History of Present Illness:  Travis Mckeon is a 71 y o  male with a PMH of obstructive sleep apnea on CPAP ,PAF on warfarin , knee OA who originally presented to  [facility] ER with right-sided weakness and ambulatory dysfunction patient initially was admitted for stroke pathway CTA of the head and neck was unremarkable ,given concern for cervical involvement CT of cervical spine was obtained and it showed multilevel degenerative changes and concern for possible paraspinal soft tissue collection  MRI of the brain was unremarkable but MRI of cervical spine showed severe cervical canal stenosis  It was discussed with Neurology and  Neurosurgery ,patient was started on IV Decadron and recommended to be transferred to [facility] for formal neurosurgery evaluation, neurosurgery recommended holding Coumadin  Patient was evaluated after arrival he has good historian chest report some right-sided weakness denies any other complaints      Review of Systems:  Review of Systems   Constitutional: Positive for activity change         Past Medical and Surgical History:   Past Medical History:   Diagnosis Date    Acute venous embolism and thrombosis of deep vessels of proximal lower extremity ([address] )     Last assessed: 5/18/15    Arthritis     Cellulitis     LE    CPAP (continuous positive airway pressure) dependence     Factor V Leiden ([address] )     Forgetfulness     [place] (hard of hearing)     Paroxysmal atrial fibrillation (HCC)     Last assessed: 11/2/15    Sleep apnea        Past Surgical History:   Procedure Laterality Date    BACK SURGERY      Lower    COLON SURGERY      COLONOSCOPY         Meds/Allergies:  Prior to Admission medications    Medication Sig Start Date End Date Taking? Authorizing Provider   celecoxib (CeleBREX) 200 mg capsule TAKE 1 CAPSULE BY MOUTH  DAILY 10/5/21   Boby Mac MD   cephalexin ) 500 mg capsule Take 1 capsule (500 mg total) by mouth every 8 (eight) hours for 10 days 2/25/22 3/7/22  Corene Kussmaul, MD   [facility] Vitamin C 500 MG tablet TAKE 1 TABLET BY MOUTH 2 TIMES A DAY 1/15/21   Alan Salinas MD   Diclofenac Sodium (VOLTAREN EX) Apply topically      Historical Provider, MD   DULoxetine (CYMBALTA) 60 mg delayed release capsule TAKE 1 CAPSULE BY MOUTH  DAILY 10/5/21   Boby Mac MD   flecainide (TAMBOCOR) 100 mg tablet TAKE 1 TABLET BY MOUTH  TWICE DAILY 10/5/21   Boby Mac MD   metoprolol succinate (TOPROL-XL) 100 mg 24 hr tablet TAKE 1 TABLET BY MOUTH  DAILY 1/12/22   Boby Mac MD   pravastatin (PRAVACHOL) 40 mg tablet TAKE 1 TABLET BY MOUTH  DAILY 10/5/21   Boby Mac MD   tamsulosin (FLOMAX) 0 4 mg TAKE 1 CAPSULE BY MOUTH  DAILY WITH DINNER 10/5/21   Boby Mac MD   warfarin (COUMADIN) 5 mg tablet TAKE 1 TABLET BY MOUTH ON  WEDNESDAY AND SATURDAY, AND 1/2 TABLET THE REST OF THE  WEEK OR AS DIRECTED BY  PT/INR 7/26/21   Boby Mac MD   zolpidem (AMBIEN) 10 mg tablet Take 1 tablet (10 mg total) by mouth daily at bedtime as needed for sleep 1/19/22   Boby Mac MD     I have reviewed home medications with a medical source (PCP, Pharmacy, other)  Allergies:    Allergies   Allergen Reactions    Morphine GI Intolerance    Vitamin K Other (See Comments)     On coumadin-patient sensitive to vitamin K amounts in meds etc        Social History:  Marital Status: /Civil Union   Occupation: none  Patient Pre-hospital Living Situation: Home  Patient Pre-hospital Level of Mobility: walks with cane  Patient Pre-hospital Diet Restrictions: reg  Substance Use History:   Social History     Substance and Sexual Activity   Alcohol Use Not Currently     Social History     Tobacco Use   Smoking Status Never Smoker   Smokeless Tobacco Never Used     Social History     Substance and Sexual Activity   Drug Use No       Family History:  Family History   Problem Relation Age of Onset    Lung cancer Mother     No Known Problems Father     Heart attack Brother     Atrial fibrillation Brother     Emphysema Sister        Physical Exam:     Vitals:        Physical Exam  Constitutional:       Appearance: He is obese  He is not ill-appearing  HENT:      Head: Normocephalic  Eyes:      Pupils: Pupils are equal, round, and reactive to light  Cardiovascular:      Rate and Rhythm: Normal rate  Pulmonary:      Effort: Pulmonary effort is normal    Abdominal:      General: There is distension  Tenderness: There is no abdominal tenderness  Musculoskeletal:         General: No swelling  Skin:     General: Skin is warm  Neurological:      Mental Status: He is oriented to person, place, and time  Cranial Nerves: No cranial nerve deficit        Comments: Rt UE4/5, RT LE 4/5   Psychiatric:         Mood and Affect: Mood normal           Additional Data:     Lab Results:  Results from last 7 days   Lab Units 03/05/22  0414   WBC Thousand/uL 4 40   HEMOGLOBIN g/dL 13 2   HEMATOCRIT % 40 9   PLATELETS Thousands/uL 189   NEUTROS PCT % 46   LYMPHS PCT % 38   MONOS PCT % 13*   EOS PCT % 2     Results from last 7 days   Lab Units 03/05/22  0414 03/04/22  1454 03/04/22  1454   SODIUM mmol/L 137   < > 136   POTASSIUM mmol/L 3 8   < > 4 2   CHLORIDE mmol/L 102   < > 101   CO2 mmol/L 29 < > 31   BUN mg/dL 12   < > 11   CREATININE mg/dL 0 76   < > 0 76   ANION GAP mmol/L 6   < > 4   CALCIUM mg/dL 8 6   < > 9 2   ALBUMIN g/dL  --   --  4 0   TOTAL BILIRUBIN mg/dL  --   --  0 80   ALK PHOS U/L  --   --  69   ALT U/L  --   --  24   AST U/L  --   --  20   GLUCOSE RANDOM mg/dL 104   < > 98    < > = values in this interval not displayed  Results from last 7 days   Lab Units 03/05/22  0414   INR  2 00*     Results from last 7 days   Lab Units 03/04/22  1520   POC GLUCOSE mg/dl 84     Results from last 7 days   Lab Units 03/05/22  0414   HEMOGLOBIN A1C % 6 3*           Imaging: Reviewed radiology reports from this admission including: MRI brain  No orders to display     ** Please Note: This note has been constructed using a voice recognition system   **

## 2022-03-06 NOTE — PROGRESS NOTES
[address] [facility]  Progress Note - Alexy Miranda 1950, 71 y o  male MRN: [id number]  Unit/Bed#: -01 Encounter: [id number]  Primary Care Provider: Betsy Escobar MD   Date and time admitted to hospital: 3/5/2022  8:18 PM    Ambulatory dysfunction  Assessment & Plan  Secondary to knee osteoarthritis and #1  Management as above  PT OT evaluate     Mixed hyperlipidemia  Assessment & Plan  Continue statin    Paroxysmal A-fib (HCC)  Assessment & Plan  Continue heart rate control medications  Hold warfarin as per Neurosurgery recommendation  Start heparin gtt       WILL (obstructive sleep apnea)  Assessment & Plan  Continue CPAP at night    Essential hypertension  Assessment & Plan  Continue home blood pressure meds with holding parameters  BP is 161/88   Will continue BB   May need additional meds if sustained elevated BPs    * Cervical myelopathy ([address] )  Assessment & Plan  Patient presented with bilateral lower extremity weakness and right-sided neck discomfort right arm numbness tingling x 3 weeks  CTA head and neck, MRI of the brain wwerte unremarkable for acute pathology  CT cervical spine: "Multilevel degenerative changes causing multilevel neural foraminal and spinal canal stenosis as described  Suspect asymmetric soft tissue in the right paraspinal region  Correlate with contrast-enhanced cervical spine MRI recommended to exclude paraspinal soft tissue infection "  MRI cervical spine: "No acute abnormality  Motion limited    Multilevel degenerative spondylosis progressed from 2013 most severe at C5-6 with severe canal stenosis and flattening of the cord  Small focus of T2 signal within the cord at C5-C6 may represent myelomalacia  Moderate canal stenosis at C3-4 mildly indents the cord without signal abnormality  Marked multilevel foraminal stenosis    No prevertebral edema or mass "  According to transferring notes " Discussed with neurosurgery Dr Claudia Gusman; recommending transfer to [facility] under [facility] with neurosurgery consult for surgical intervention  Recommended holding coumadin for now  Surgical intervention timing TBD  Trend INR daily "  Continue Decadron 10 mg IV daily  Fall precaution    Discussed with neurosurgery - okay to start gtt and to order diet today   Will clarify ambulation recs with them/ PT to clarify with neurosurgery                 VTE Pharmacologic Prophylaxis: VTE Score: 3 Moderate Risk (Score 3-4) - Pharmacological DVT Prophylaxis Ordered: heparin  Patient Centered Rounds: I performed bedside rounds with nursing staff today  Discussions with Specialists or Other Care Team Provider: discussed with neurosurgery - okmeredith to start heparin gtt  Also okay to ambulate with caution with Pt     Education and Discussions with Family / Patient: CALLED WIFE LEFT VOICEMAIL   Time Spent for Care: 30 minutes  More than 50% of total time spent on counseling and coordination of care as described above  Current Length of Stay: 1 day(s)  Current Patient Status: Inpatient   Certification Statement: The patient will continue to require additional inpatient hospital stay due to neurosurgical intervention   Discharge Plan: once cleared by neurosurgery     Code Status: Level 3 - DNAR and DNI    Subjective:   Patient seen and examined   Feeling "fine"  Still has some RUE weakness  Objective:     Vitals:   Temp (24hrs), Av 9 °F (36 6 °C), Min:97 3 °F (36 3 °C), Max:98 2 °F (36 8 °C)    Temp:  [97 3 °F (36 3 °C)-98 2 °F (36 8 °C)] 97 3 °F (36 3 °C)  HR:  [57-72] 70  Resp:  [18] 18  BP: (107-162)/(75-88) 161/88  SpO2:  [91 %-98 %] 96 %  There is no height or weight on file to calculate BMI  Input and Output Summary (last 24 hours):      Intake/Output Summary (Last 24 hours) at 3/6/2022 1108  Last data filed at 3/6/2022 0946  Gross per 24 hour   Intake 907 5 ml   Output 750 ml   Net 157 5 ml       Physical Exam:   Physical Exam  Constitutional:       General: He is not in acute distress  Appearance: He is not ill-appearing, toxic-appearing or diaphoretic  HENT:      Head: Normocephalic  Nose: Nose normal       Mouth/Throat:      Mouth: Mucous membranes are moist    Eyes:      General: No scleral icterus  Right eye: No discharge  Left eye: No discharge  Pupils: Pupils are equal, round, and reactive to light  Cardiovascular:      Rate and Rhythm: Normal rate  Pulmonary:      Effort: Pulmonary effort is normal  No respiratory distress  Breath sounds: No stridor  No wheezing, rhonchi or rales  Chest:      Chest wall: No tenderness  Abdominal:      General: There is no distension  Palpations: There is no mass  Tenderness: There is no abdominal tenderness  There is no right CVA tenderness, guarding or rebound  Hernia: No hernia is present  Skin:     Capillary Refill: Capillary refill takes less than 2 seconds  Coloration: Skin is pale  Skin is not jaundiced  Findings: No bruising, lesion or rash  Neurological:      Mental Status: He is alert  Cranial Nerves: No cranial nerve deficit  Sensory: No sensory deficit  Motor: No weakness (RUE weakness at pronating and 4/5 hand/ strength  )        Gait: Gait normal       Deep Tendon Reflexes: Reflexes normal    Psychiatric:         Mood and Affect: Mood normal           Additional Data:     Labs:  Results from last 7 days   Lab Units 03/06/22  0448   WBC Thousand/uL 4 29*   HEMOGLOBIN g/dL 14 0   HEMATOCRIT % 40 2   PLATELETS Thousands/uL 191   NEUTROS PCT % 46   LYMPHS PCT % 38   MONOS PCT % 12   EOS PCT % 2     Results from last 7 days   Lab Units 03/06/22  0448 03/05/22  0414 03/04/22  1454   SODIUM mmol/L 137   < > 136   POTASSIUM mmol/L 3 4*   < > 4 2   CHLORIDE mmol/L 105   < > 101   CO2 mmol/L 27   < > 31   BUN mg/dL 12   < > 11   CREATININE mg/dL 0 66   < > 0 76   ANION GAP mmol/L 5   < > 4   CALCIUM mg/dL 9 1   < > 9 2   ALBUMIN g/dL  --   -- 4  0   TOTAL BILIRUBIN mg/dL  --   --  0 80   ALK PHOS U/L  --   --  69   ALT U/L  --   --  24   AST U/L  --   --  20   GLUCOSE RANDOM mg/dL 123   < > 98    < > = values in this interval not displayed  Results from last 7 days   Lab Units 03/06/22  0448   INR  1 96*     Results from last 7 days   Lab Units 03/04/22  1520   POC GLUCOSE mg/dl 84     Results from last 7 days   Lab Units 03/05/22  0414   HEMOGLOBIN A1C % 6 3*           Lines/Drains:  Invasive Devices  Report    Peripheral Intravenous Line            Peripheral IV 03/04/22 Right Antecubital 1 day                      Imaging: No pertinent imaging reviewed      Recent Cultures (last 7 days):         Last 24 Hours Medication List:   Current Facility-Administered Medications   Medication Dose Route Frequency Provider Last Rate    acetaminophen  650 mg Oral Q4H PRN MD Jodi Alex [START ON 3/7/2022] acetaminophen  975 mg Oral Once Werner Zhou MD      atorvastatin  40 mg Oral QPM MD Jodi Alex ON 3/7/2022] cefazolin  2,000 mg Intravenous Once Werner Zhou MD      celecoxib  200 mg Oral Daily MD Mikhail Alex ON 3/7/2022] chlorhexidine   Topical Daily PRN MD Jodi Alex [START ON 3/7/2022] chlorhexidine  15 mL Swish & Spit Once Werner Zhou MD      dexamethasone  10 mg Intravenous Daily Werner Zhou MD      DULoxetine  60 mg Oral HS Werner Zhou MD      flecainide  100 mg Oral BID Werner Zhou MD      metoprolol succinate  100 mg Oral Daily Werner Zhou MD      sodium chloride  75 mL/hr Intravenous Continuous Werner Zhou MD 75 mL/hr (03/06/22 0024)    tamsulosin  0 4 mg Oral Daily With Brian Giles MD      tranexamic acid (CYKLOKAPRON) IV bolus  1,000 mg Intravenous Once Werner Zhou MD          Today, Patient Was Seen By: Michelle Cazares MD    **Please Note: This note may have been constructed using a voice recognition system  **

## 2022-03-06 NOTE — ASSESSMENT & PLAN NOTE
Continue heart rate control medications  Hold warfarin as per Neurosurgery recommendation  Start heparin gtt

## 2022-03-06 NOTE — PHYSICAL THERAPY NOTE
Physical Therapy Cancellation Note       03/06/22 1136   PT Last Visit   PT Visit Date 03/06/22   Note Type   Note type Evaluation   Cancel Reasons Medical status     PT orders received and chart reviewed  Pt presenting w/ "progressive cervical myelopathy secondary to C5-C6 stenosis with cord compression" Pt pending formal neurosx consult for potential surgical intervention  Will hold PT evaluation at this time  Will continue to follow      Gayathri Jason, PT, DPT

## 2022-03-06 NOTE — CONSULTS
[address] 1950, 71 y o  male MRN: [id number]  Unit/Bed#: -01 Encounter: [id number]  Primary Care Provider: Alia Hill MD   Date and time admitted to hospital: 3/5/2022  8:18 PM    Inpatient consult to Neurosurgery  Consult performed by: FABIOLA Min  Consult ordered by: Macrina Trujillo MD          * Cervical myelopathy [facility])  Assessment & Plan  Severe canal stenosis C5-6 with small area of signal abnormality   Presented with significant decline in ambulation along with numbness and weakness R>L over the last week  Reports multiple falls  Imaging:    MRI cervical spine with without 3/5/2022: Motion limited  Multilevel degenerative spondylosis progressed from 2013 with most severe canal stenosis noted at C5-6 with flattening of the cord and small focus of T2 signal abnormality likely representing myelomalacia  Moderate canal stenosis at C3-4 which mildly indents the cord without signal abnormality  Plan:  Patient seen in conjunction with Dr Amanda Curtis monitor neurological exam and symptoms   MRI results reviewed in detail with patient and wife who was on speaker phone   Discussed etiology of patient's symptoms correlating with cervical stenosis   Patient on a trial of steroids  Currently ordered Decadron 10mg IV daily  Consider 4mg q 6   Discussed need for surgical intervention and anticipated anterior cervical diskectomy fixation and fusion  Procedure along with risks and benefits discussed   Patient will need a medical optimization and medical clearance   Given 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, prior anticoagulation is currently hold  Deferred reversing secondary to clotting disorder  Okay for Heparin bridge if medically indicated   Will dc Celebrex   Patient may have diet today  Timing for surgical intervention TBD - anticipate later this week  Neurosurgery will continue to follow  Call with any questions/concerns  History of Present Illness     HPI: Monika Carlisle is a 71 y o  male with PMH including Factor five Leiden, history of D, paroxysmal atrial fibrillation, sleep apnea on CPAP who presents with complaint of progressive walking difficulties and associated right greater than left numbness and weakness  Patient states symptoms began approximately three weeks ago but significantly progressive over the last one week  He has began using a cane and continues to have recurrent falls  He describes progressive weakness of his right side along with associated numbness  Approximately one month ago he is ambulatory without the use of an assistive device  He does have a history of chronic bilateral knee arthritis and was pending surgery  Patient also endorses urinary incontinence with a history of urinary urgency  He was seen by a neurology prior to transfer  Physical exam was consistent with cervical myelopathy  MRI brain was completed without any acute finding  MRI cervical spine demonstrated significant canal stenosis at C5-6 with cord signal abnormality  For these reasons he was transferred to [facility] for ongoing care neurosurgical intervention  Review of Systems   Constitutional: Positive for activity change  Negative for fatigue and fever  HENT: Negative for trouble swallowing and voice change  Eyes: Negative for photophobia and visual disturbance  Respiratory: Negative for cough and shortness of breath  Cardiovascular: Negative for chest pain and leg swelling  Gastrointestinal: Negative for abdominal pain, nausea and vomiting  Genitourinary: Positive for urgency  Negative for decreased urine volume  Musculoskeletal: Positive for gait problem  Negative for back pain and neck pain  Skin: Negative for pallor, rash and wound  Neurological: Positive for weakness and numbness   Negative for dizziness, tremors, seizures, speech difficulty, light-headedness and headaches  Psychiatric/Behavioral: Negative for agitation and confusion         Historical Information   Past Medical History:   Diagnosis Date    Acute venous embolism and thrombosis of deep vessels of proximal lower extremity (HCC)     Last assessed: 5/18/15    Arthritis     Cellulitis     LE    CPAP (continuous positive airway pressure) dependence     Factor V Leiden ([address] )     Forgetfulness     [place] (hard of hearing)     Paroxysmal atrial fibrillation (HCC)     Last assessed: 11/2/15    Sleep apnea      Past Surgical History:   Procedure Laterality Date    BACK SURGERY      Lower    COLON SURGERY      COLONOSCOPY       Social History     Substance and Sexual Activity   Alcohol Use Not Currently     Social History     Substance and Sexual Activity   Drug Use No     Social History     Tobacco Use   Smoking Status Never Smoker   Smokeless Tobacco Never Used     Family History   Problem Relation Age of Onset    Lung cancer Mother     No Known Problems Father     Heart attack Brother     Atrial fibrillation Brother     Emphysema Sister        Meds/Allergies   all current active meds have been reviewed, current meds:   Current Facility-Administered Medications   Medication Dose Route Frequency    acetaminophen (TYLENOL) tablet 650 mg  650 mg Oral Q4H PRN    [START ON 3/7/2022] acetaminophen (TYLENOL) tablet 975 mg  975 mg Oral Once    atorvastatin (LIPITOR) tablet 40 mg  40 mg Oral QPM    [START ON 3/7/2022] ceFAZolin (ANCEF) IVPB (premix in dextrose) 2,000 mg 50 mL  2,000 mg Intravenous Once    [START ON 3/7/2022] chlorhexidine (HIBICLENS) 4 % topical liquid   Topical Daily PRN    [START ON 3/7/2022] chlorhexidine (PERIDEX) 0 12 % oral rinse 15 mL  15 mL Swish & Spit Once    dexamethasone (DECADRON) injection 10 mg  10 mg Intravenous Daily    DULoxetine (CYMBALTA) delayed release capsule 60 mg  60 mg Oral HS    flecainide (TAMBOCOR) tablet 100 mg  100 mg Oral BID    metoprolol succinate (TOPROL-XL) 24 hr tablet 100 mg  100 mg Oral Daily    sodium chloride 0 9 % infusion  75 mL/hr Intravenous Continuous    tamsulosin (FLOMAX) capsule 0 4 mg  0 4 mg Oral Daily With Dinner    tranexamic Acid 1,000 mg in sodium chloride 0 9 % 100 mL IVPB  1,000 mg Intravenous Once    and PTA meds:   Prior to Admission Medications   Prescriptions Last Dose Informant Patient Reported? Taking?    [facility] Vitamin C 500 MG tablet 3/4/2022 at Unknown time Self No Yes   Sig: TAKE 1 TABLET BY MOUTH 2 TIMES A DAY   DULoxetine (CYMBALTA) 60 mg delayed release capsule 3/4/2022 at Unknown time Self No Yes   Sig: TAKE 1 CAPSULE BY MOUTH  DAILY   Diclofenac Sodium (VOLTAREN EX)  Self Yes No   Sig: Apply topically     celecoxib (CeleBREX) 200 mg capsule 3/4/2022 at Unknown time Self No Yes   Sig: TAKE 1 CAPSULE BY MOUTH  DAILY   cephalexin (KEFLEX) 500 mg capsule 3/4/2022 at Unknown time  No Yes   Sig: Take 1 capsule (500 mg total) by mouth every 8 (eight) hours for 10 days   flecainide (TAMBOCOR) 100 mg tablet 3/4/2022 at Unknown time Self No Yes   Sig: TAKE 1 TABLET BY MOUTH  TWICE DAILY   metoprolol succinate (TOPROL-XL) 100 mg 24 hr tablet 3/4/2022 at Unknown time  No Yes   Sig: TAKE 1 TABLET BY MOUTH  DAILY   pravastatin (PRAVACHOL) 40 mg tablet 3/4/2022 at Unknown time Self No Yes   Sig: TAKE 1 TABLET BY MOUTH  DAILY   tamsulosin (FLOMAX) 0 4 mg 3/4/2022 at Unknown time Self No Yes   Sig: TAKE 1 CAPSULE BY MOUTH  DAILY WITH DINNER   warfarin (COUMADIN) 5 mg tablet 3/4/2022 at Unknown time Self No Yes   Sig: TAKE 1 TABLET BY MOUTH ON  WEDNESDAY AND SATURDAY, AND 1/2 TABLET THE REST OF THE  WEEK OR AS DIRECTED BY  PT/INR   zolpidem (AMBIEN) 10 mg tablet Not Taking at Unknown time  No No   Sig: Take 1 tablet (10 mg total) by mouth daily at bedtime as needed for sleep   Patient not taking: Reported on 3/5/2022       Facility-Administered Medications Last Administration Doses Remaining   bupivacaine (MARCAINE) 0 25 % injection 4 mL 12/3/2020 10:03 AM    methylPREDNISolone acetate (DEPO-MEDROL) injection 1 mL 12/3/2020 10:03 AM         Allergies   Allergen Reactions    Morphine GI Intolerance    Vitamin K Other (See Comments)     On coumadin-patient sensitive to vitamin K amounts in meds etc        Objective   I/O       03/04 0701 03/05 0700 03/05 0701 03/06 0700 03/06 0701 03/07 0700    I V    907 5    Total Intake   907 5    Urine  400 350    Total Output  400 350    Net  -400 +557 5                 Physical Exam  Constitutional:       General: He is not in acute distress  Appearance: Normal appearance  He is well-developed  He is not ill-appearing  HENT:      Head: Normocephalic and atraumatic  Right Ear: External ear normal       Left Ear: External ear normal       Nose: Nose normal       Mouth/Throat:      Mouth: Mucous membranes are moist    Eyes:      General: No scleral icterus  Right eye: No discharge  Extraocular Movements: EOM normal       Conjunctiva/sclera: Conjunctivae normal    Cardiovascular:      Rate and Rhythm: Normal rate  Pulmonary:      Effort: Pulmonary effort is normal  No respiratory distress  Abdominal:      General: There is no distension  Musculoskeletal:      Cervical back: Normal range of motion and neck supple  Skin:     General: Skin is warm and dry  Neurological:      Mental Status: He is alert  Psychiatric:         Mood and Affect: Mood normal          Speech: Speech normal          Behavior: Behavior normal          Thought Content: Thought content normal          Judgment: Judgment normal        Neurologic Exam     Mental Status   Follows 3 step commands  Attention: normal  Concentration: normal    Speech: speech is normal   Level of consciousness: alert  Knowledge: good  Normal comprehension       Cranial Nerves     CN III, IV, VI   Extraocular motions are normal    Nystagmus: none   Upgaze: normal  Conjugate gaze: present    CN VII   Facial expression full, symmetric  CN VIII   Hearing: intact    CN XI   Right trapezius strength: normal  Left trapezius strength: normal    CN XII   Tongue: not atrophic  Fasciculations: absent  Tongue deviation: none    Motor Exam   Muscle bulk: normal  Overall muscle tone: normal    Strength   Strength 5/5 except as noted  RUE - bicep 4/5,  4/5  LUE 5/5  RLE 3-4/5  LLE 4-4+/5     Sensory Exam   Right arm light touch: diffusely decreased worse in diffuse hand  Left arm light touch: abnl C6 region  Right leg light touch: diminished compared to left  Pinprick normal      Gait, Coordination, and Reflexes     Tremor   Resting tremor: absent  Intention tremor: absent  Action tremor: absent    Reflexes   Right Jiménez: present  Left Jiménez: absent  Right ankle clonus: present  Left ankle clonus: absent        Vitals:Blood pressure 161/88, pulse 70, temperature (!) 97 3 °F (36 3 °C), SpO2 96 %  ,There is no height or weight on file to calculate BMI  Lab Results:   Results from last 7 days   Lab Units 03/06/22 0448 03/05/22 0414 03/04/22  1454   WBC Thousand/uL 4 29* 4 40 5 22   HEMOGLOBIN g/dL 14 0 13 2 14 1   HEMATOCRIT % 40 2 40 9 42 9   PLATELETS Thousands/uL 191 189 221   NEUTROS PCT % 46 46 54   MONOS PCT % 12 13* 10     Results from last 7 days   Lab Units 03/06/22 0448 03/05/22 0414 03/04/22  1454   POTASSIUM mmol/L 3 4* 3 8 4 2   CHLORIDE mmol/L 105 102 101   CO2 mmol/L 27 29 31   BUN mg/dL 12 12 11   CREATININE mg/dL 0 66 0 76 0 76   CALCIUM mg/dL 9 1 8 6 9 2   ALK PHOS U/L  --   --  69   ALT U/L  --   --  24   AST U/L  --   --  20             Results from last 7 days   Lab Units 03/06/22 0448 03/05/22 0414 03/04/22  1454   INR  1 96* 2 00* 2 02*   PTT seconds  --   --  33     No results found for: TROPONINT  ABG:No results found for: PHART, KUT3NYT, PO2ART, MSS2IFO, A3VSULBK, BEART, SOURCE    Imaging Studies: I have personally reviewed pertinent reports     and I have personally reviewed pertinent films in PACS    MRI brain wo contrast    Result Date: 3/5/2022  Impression: No acute intracranial pathology  Mild chronic microangiopathy  Workstation performed: [id number]     MRI cervical spine w wo contrast    Result Date: 3/5/2022  Impression: No acute abnormality  Motion limited    Multilevel degenerative spondylosis progressed from 2013 most severe at C5-6 with severe canal stenosis and flattening of the cord  Small focus of T2 signal within the cord at C5-C6 may represent myelomalacia  Moderate canal stenosis at C3-4 mildly indents the cord without signal abnormality  Marked multilevel foraminal stenosis  No prevertebral edema or mass  Workstation performed: [id number]       EKG, Pathology, and Other Studies: none    VTE Prophylaxis: Sequential compression device (Venodyne)  and Heparin    Code Status: Level 3 - DNAR and DNI  Advance Directive and Living Will: Yes    Power of :    POLST:      Counseling / Coordination of Care  I spent 30 minutes with the patient

## 2022-03-06 NOTE — ASSESSMENT & PLAN NOTE
Continue home blood pressure meds with holding parameters  BP is 161/88   Will continue BB   May need additional meds if sustained elevated BPs

## 2022-03-06 NOTE — ASSESSMENT & PLAN NOTE
Patient presented with bilateral lower extremity weakness and right-sided neck discomfort right arm numbness tingling x 3 weeks  CTA head and neck, MRI of the brain wwerte unremarkable for acute pathology  CT cervical spine: "Multilevel degenerative changes causing multilevel neural foraminal and spinal canal stenosis as described  Suspect asymmetric soft tissue in the right paraspinal region  Correlate with contrast-enhanced cervical spine MRI recommended to exclude paraspinal soft tissue infection "  MRI cervical spine: "No acute abnormality  Motion limited    Multilevel degenerative spondylosis progressed from 2013 most severe at C5-6 with severe canal stenosis and flattening of the cord  Small focus of T2 signal within the cord at C5-C6 may represent myelomalacia  Moderate canal stenosis at C3-4 mildly indents the cord without signal abnormality  Marked multilevel foraminal stenosis  No prevertebral edema or mass "  According to transferring notes " Discussed with neurosurgery Dr Roxann Irby; recommending transfer to [facility] with neurosurgery consult for surgical intervention  Recommended holding coumadin for now  Surgical intervention timing TBD    Trend INR daily "  Continue Decadron 10 mg IV daily  Fall precaution

## 2022-03-06 NOTE — PLAN OF CARE
Problem: MOBILITY - ADULT  Goal: Maintain or return to baseline ADL function  Description: INTERVENTIONS:  -  Assess patient's ability to carry out ADLs; assess patient's baseline for ADL function and identify physical deficits which impact ability to perform ADLs (bathing, care of mouth/teeth, toileting, grooming, dressing, etc )  - Assess/evaluate cause of self-care deficits   - Assess range of motion  - Assess patient's mobility; develop plan if impaired  - Assess patient's need for assistive devices and provide as appropriate  - Encourage maximum independence but intervene and supervise when necessary  - Involve family in performance of ADLs  - Assess for home care needs following discharge   - Consider OT consult to assist with ADL evaluation and planning for discharge  - Provide patient education as appropriate  Outcome: Progressing  Goal: Maintains/Returns to pre admission functional level  Description: INTERVENTIONS:  - Perform BMAT or MOVE assessment daily    - Set and communicate daily mobility goal to care team and patient/family/caregiver  - Collaborate with rehabilitation services on mobility goals if consulted  - Perform Range of Motion 3 times a day  - Reposition patient every 2 hours    - Dangle patient 3 times a day  - Stand patient 3 times a day  - Ambulate patient 3 times a day  - Out of bed to chair 3 times a day   - Out of bed for meals 3 times a day  - Out of bed for toileting  - Record patient progress and toleration of activity level   Outcome: Progressing     Problem: PAIN - ADULT  Goal: Verbalizes/displays adequate comfort level or baseline comfort level  Description: Interventions:  - Encourage patient to monitor pain and request assistance  - Assess pain using appropriate pain scale  - Administer analgesics based on type and severity of pain and evaluate response  - Implement non-pharmacological measures as appropriate and evaluate response  - Consider cultural and social influences on pain and pain management  - Notify physician/advanced practitioner if interventions unsuccessful or patient reports new pain  Outcome: Progressing     Problem: INFECTION - ADULT  Goal: Absence or prevention of progression during hospitalization  Description: INTERVENTIONS:  - Assess and monitor for signs and symptoms of infection  - Monitor lab/diagnostic results  - Monitor all insertion sites, i e  indwelling lines, tubes, and drains  - Monitor endotracheal if appropriate and nasal secretions for changes in amount and color  - Lyons appropriate cooling/warming therapies per order  - Administer medications as ordered  - Instruct and encourage patient and family to use good hand hygiene technique  - Identify and instruct in appropriate isolation precautions for identified infection/condition  Outcome: Progressing  Goal: Absence of fever/infection during neutropenic period  Description: INTERVENTIONS:  - Monitor WBC    Outcome: Progressing     Problem: SAFETY ADULT  Goal: Maintain or return to baseline ADL function  Description: INTERVENTIONS:  -  Assess patient's ability to carry out ADLs; assess patient's baseline for ADL function and identify physical deficits which impact ability to perform ADLs (bathing, care of mouth/teeth, toileting, grooming, dressing, etc )  - Assess/evaluate cause of self-care deficits   - Assess range of motion  - Assess patient's mobility; develop plan if impaired  - Assess patient's need for assistive devices and provide as appropriate  - Encourage maximum independence but intervene and supervise when necessary  - Involve family in performance of ADLs  - Assess for home care needs following discharge   - Consider OT consult to assist with ADL evaluation and planning for discharge  - Provide patient education as appropriate  Outcome: Progressing  Goal: Maintains/Returns to pre admission functional level  Description: INTERVENTIONS:  - Perform BMAT or MOVE assessment daily    - Set and communicate daily mobility goal to care team and patient/family/caregiver  - Collaborate with rehabilitation services on mobility goals if consulted  - Perform Range of Motion 3 times a day  - Reposition patient every 2 hours    - Dangle patient 3 times a day  - Stand patient 3 times a day  - Ambulate patient 3 times a day  - Out of bed to chair 3 times a day   - Out of bed for meals 3 times a day  - Out of bed for toileting  - Record patient progress and toleration of activity level   Outcome: Progressing  Goal: Patient will remain free of falls  Description: INTERVENTIONS:  - Educate patient/family on patient safety including physical limitations  - Instruct patient to call for assistance with activity   - Consult OT/PT to assist with strengthening/mobility   - Keep Call bell within reach  - Keep bed low and locked with side rails adjusted as appropriate  - Keep care items and personal belongings within reach  - Initiate and maintain comfort rounds  - Make Fall Risk Sign visible to staff  - Offer Toileting every 2 Hours, in advance of need  - Initiate/Maintain bed alarm  - Obtain necessary fall risk management equipment: walker  - Apply yellow socks and bracelet for high fall risk patients  - Consider moving patient to room near nurses station  Outcome: Progressing     Problem: DISCHARGE PLANNING  Goal: Discharge to home or other facility with appropriate resources  Description: INTERVENTIONS:  - Identify barriers to discharge w/patient and caregiver  - Arrange for needed discharge resources and transportation as appropriate  - Identify discharge learning needs (meds, wound care, etc )  - Arrange for interpretive services to assist at discharge as needed  - Refer to Case Management Department for coordinating discharge planning if the patient needs post-hospital services based on physician/advanced practitioner order or complex needs related to functional status, cognitive ability, or social support system  Outcome: Progressing     Problem: Knowledge Deficit  Goal: Patient/family/caregiver demonstrates understanding of disease process, treatment plan, medications, and discharge instructions  Description: Complete learning assessment and assess knowledge base  Interventions:  - Provide teaching at level of understanding  - Provide teaching via preferred learning methods  Outcome: Progressing     Problem: Neurological Deficit  Goal: Neurological status is stable or improving  Description: Interventions:  - Monitor and assess patient's level of consciousness, motor function, sensory function, and level of assistance needed for ADLs  - Monitor and report changes from baseline  Collaborate with interdisciplinary team to initiate plan and implement interventions as ordered  - Provide and maintain a safe environment  - Consider seizure precautions  - Consider fall precautions  - Consider aspiration precautions  - Consider bleeding precautions  Outcome: Progressing     Problem: Activity Intolerance/Impaired Mobility  Goal: Mobility/activity is maintained at optimum level for patient  Description: Interventions:  - Assess and monitor patient  barriers to mobility and need for assistive/adaptive devices  - Assess patient's emotional response to limitations  - Collaborate with interdisciplinary team and initiate plans and interventions as ordered  - Encourage independent activity per ability   - Maintain proper body alignment  - Perform active/passive rom as tolerated/ordered  - Plan activities to conserve energy   - Turn patient as appropriate  Outcome: Progressing     Problem: Communication Impairment  Goal: Ability to express needs and understand communication  Description: Assess patient's communication skills and ability to understand information  Patient will demonstrate use of effective communication techniques, alternative methods of communication and understanding even if not able to speak       - Encourage communication and provide alternate methods of communication as needed  - Collaborate with case management/ for discharge needs  - Include patient/family/caregiver in decisions related to communication  Outcome: Progressing     Problem: Potential for Aspiration  Goal: Non-ventilated patient's risk of aspiration is minimized  Description: Assess and monitor vital signs, respiratory status, and labs (WBC)  Monitor for signs of aspiration (tachypnea, cough, rales, wheezing, cyanosis, fever)  - Assess and monitor patient's ability to swallow  - Place patient up in chair to eat if possible  - HOB up at 90 degrees to eat if unable to get patient up into chair   - Supervise patient during oral intake  - Instruct patient/ family to take small bites  - Instruct patient/ family to take small single sips when taking liquids  - Follow patient-specific strategies generated by speech pathologist   Outcome: Progressing  Goal: Ventilated patient's risk of aspiration is minimized  Description: Assess and monitor vital signs, respiratory status, airway cuff pressure, and labs (WBC)  Monitor for signs of aspiration (tachypnea, cough, rales, wheezing, cyanosis, fever)  - Elevate head of bed 30 degrees if patient has tube feeding   - Monitor tube feeding  Outcome: Progressing     Problem: Nutrition  Goal: Nutrition/Hydration status is improving  Description: Monitor and assess patient's nutrition/hydration status for malnutrition (ex- brittle hair, bruises, dry skin, pale skin and conjunctiva, muscle wasting, smooth red tongue, and disorientation)  Collaborate with interdisciplinary team and initiate plan and interventions as ordered  Monitor patient's weight and dietary intake as ordered or per policy  Utilize nutrition screening tool and intervene per policy  Determine patient's food preferences and provide high-protein, high-caloric foods as appropriate       - Assist patient with eating   - Allow adequate time for meals   - Encourage patient to take dietary supplement as ordered  - Collaborate with clinical nutritionist   - Include patient/family/caregiver in decisions related to nutrition    Outcome: Progressing

## 2022-03-06 NOTE — ASSESSMENT & PLAN NOTE
Patient presented with bilateral lower extremity weakness and right-sided neck discomfort right arm numbness tingling x 3 weeks  CTA head and neck, MRI of the brain wwerte unremarkable for acute pathology  CT cervical spine: "Multilevel degenerative changes causing multilevel neural foraminal and spinal canal stenosis as described  Suspect asymmetric soft tissue in the right paraspinal region  Correlate with contrast-enhanced cervical spine MRI recommended to exclude paraspinal soft tissue infection "  MRI cervical spine: "No acute abnormality  Motion limited    Multilevel degenerative spondylosis progressed from 2013 most severe at C5-6 with severe canal stenosis and flattening of the cord  Small focus of T2 signal within the cord at C5-C6 may represent myelomalacia  Moderate canal stenosis at C3-4 mildly indents the cord without signal abnormality  Marked multilevel foraminal stenosis  No prevertebral edema or mass "  According to transferring notes " Discussed with neurosurgery Dr Marjan Mojica; recommending transfer to [facility] with neurosurgery consult for surgical intervention  Recommended holding coumadin for now  Surgical intervention timing TBD    Trend INR daily "  Continue Decadron 10 mg IV daily  Fall precaution    Discussed with neurosurgery - okay to start gtt and to order diet today   Will clarify ambulation recs with them/ PT to clarify with neurosurgery

## 2022-03-06 NOTE — OCCUPATIONAL THERAPY NOTE
Occupational Therapy         Patient Name: Alana Mckeon  [id number] Date: 3/6/2022     03/06/22 0900   OT Last Visit   OT Visit Date 03/06/22   Note Type   Note type Evaluation   Cancel Reasons Medical status     OT consult received, chart reviewed  Pt admitted with cervical myelopathy,  Pt pending neurosurgery consult for possible surgical intervention and anticipated anterior cervical diskectomy fixation and fusion  Will continue to follow   Max ANGUIANO, OTR/L

## 2022-03-06 NOTE — CONSULTS
Reason for Consult: Obesity    Education: Ability to learn (Reviewed Low Fat Diet, MyPlate (Portion Size Recommendations), Label Reading)    Interventions: Diet: continued as ordered,Education initiated/completed    Recommendations:  Other (Specify) (Cardiac Diet)

## 2022-03-06 NOTE — PROGRESS NOTES
Patient sent to [facility] via [facility] team  Called [facility] to attempt to give report to receiving nurse, no answer  Attempted to call again for report about 20min later and no one picked up

## 2022-03-06 NOTE — ASSESSMENT & PLAN NOTE
Severe canal stenosis C5-6 with small area of signal abnormality   Presented with significant decline in ambulation along with numbness and weakness R>L over the last week  Reports multiple falls  Imaging:    MRI cervical spine with without 3/5/2022: Motion limited  Multilevel degenerative spondylosis progressed from 2013 with most severe canal stenosis noted at C5-6 with flattening of the cord and small focus of T2 signal abnormality likely representing myelomalacia  Moderate canal stenosis at C3-4 which mildly indents the cord without signal abnormality  Plan:  Patient seen in conjunction with Dr Hu Harley monitor neurological exam and symptoms   MRI results reviewed in detail with patient and wife who was on speaker phone   Discussed etiology of patient's symptoms correlating with cervical stenosis   Patient on a trial of steroids  Currently ordered Decadron 10mg IV daily  Consider 4mg q 6   Discussed need for surgical intervention and anticipated anterior cervical diskectomy fixation and fusion  Procedure along with risks and benefits discussed   Patient will need a medical optimization and medical clearance   Given 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, prior anticoagulation is currently hold  Deferred reversing secondary to clotting disorder  Okay for Heparin bridge if medically indicated   Will dc Celebrex   Patient may have diet today  Timing for surgical intervention TBD - anticipate later this week  Neurosurgery will continue to follow  Call with any questions/concerns

## 2022-03-07 ENCOUNTER — TELEPHONE (OUTPATIENT)
Dept: NEUROSURGERY | Facility: CLINIC | Age: 72
End: 2022-03-07

## 2022-03-07 ENCOUNTER — TRANSITIONAL CARE MANAGEMENT (OUTPATIENT)
Dept: FAMILY MEDICINE CLINIC | Facility: CLINIC | Age: 72
End: 2022-03-07

## 2022-03-07 LAB
ALBUMIN SERPL BCP-MCNC: 3.5 G/DL (ref 3.5–5)
ALP SERPL-CCNC: 62 U/L (ref 46–116)
ALT SERPL W P-5'-P-CCNC: 18 U/L (ref 12–78)
ANION GAP SERPL CALCULATED.3IONS-SCNC: 7 MMOL/L (ref 4–13)
AORTIC ROOT: 3.6 CM
AORTIC VALVE MEAN VELOCITY: 18.7 M/S
APTT PPP: 113 SECONDS (ref 23–37)
APTT PPP: 58 SECONDS (ref 23–37)
APTT PPP: >210 SECONDS (ref 23–37)
AST SERPL W P-5'-P-CCNC: 16 U/L (ref 5–45)
AV AREA BY CONTINUOUS VTI: 1.3 CM2
AV AREA PEAK VELOCITY: 1.2 CM2
AV LVOT MEAN GRADIENT: 2 MMHG
AV LVOT PEAK GRADIENT: 4 MMHG
AV MEAN GRADIENT: 16 MMHG
AV PEAK GRADIENT: 31 MMHG
AV VALVE AREA: 1.31 CM2
AV VELOCITY RATIO: 0.34
AVA (PLAN): 1.7 CM2
BASOPHILS # BLD AUTO: 0 THOUSANDS/ΜL (ref 0–0.1)
BASOPHILS NFR BLD AUTO: 0 % (ref 0–1)
BILIRUB SERPL-MCNC: 0.63 MG/DL (ref 0.2–1)
BUN SERPL-MCNC: 13 MG/DL (ref 5–25)
CALCIUM SERPL-MCNC: 9.3 MG/DL (ref 8.3–10.1)
CHLORIDE SERPL-SCNC: 106 MMOL/L (ref 100–108)
CO2 SERPL-SCNC: 24 MMOL/L (ref 21–32)
CREAT SERPL-MCNC: 0.68 MG/DL (ref 0.6–1.3)
DOP CALC AO PEAK VEL: 2.78 M/S
DOP CALC AO VTI: 64.91 CM
DOP CALC LVOT AREA: 3.46 CM2
DOP CALC LVOT DIAMETER: 2.1 CM
DOP CALC LVOT PEAK VEL VTI: 24.64 CM
DOP CALC LVOT PEAK VEL: 0.95 M/S
DOP CALC LVOT STROKE INDEX: 35.5 ML/M2
DOP CALC LVOT STROKE VOLUME: 85.3 CM3
DOP CALC MV VTI: 32.17 CM
E WAVE DECELERATION TIME: 249 MS
EOSINOPHIL # BLD AUTO: 0 THOUSAND/ΜL (ref 0–0.61)
EOSINOPHIL NFR BLD AUTO: 0 % (ref 0–6)
ERYTHROCYTE [DISTWIDTH] IN BLOOD BY AUTOMATED COUNT: 12.6 % (ref 11.6–15.1)
GFR SERPL CREATININE-BSD FRML MDRD: 96 ML/MIN/1.73SQ M
GLUCOSE SERPL-MCNC: 148 MG/DL (ref 65–140)
HCT VFR BLD AUTO: 41.1 % (ref 36.5–49.3)
HGB BLD-MCNC: 13.4 G/DL (ref 12–17)
IMM GRANULOCYTES # BLD AUTO: 0.03 THOUSAND/UL (ref 0–0.2)
IMM GRANULOCYTES NFR BLD AUTO: 0 % (ref 0–2)
INR PPP: 1.77 (ref 0.84–1.19)
LAAS-AP2: 32.3 CM2
LAAS-AP4: 31.2 CM2
LYMPHOCYTES # BLD AUTO: 0.93 THOUSANDS/ΜL (ref 0.6–4.47)
LYMPHOCYTES NFR BLD AUTO: 13 % (ref 14–44)
MCH RBC QN AUTO: 30.2 PG (ref 26.8–34.3)
MCHC RBC AUTO-ENTMCNC: 32.6 G/DL (ref 31.4–37.4)
MCV RBC AUTO: 93 FL (ref 82–98)
MONOCYTES # BLD AUTO: 0.37 THOUSAND/ΜL (ref 0.17–1.22)
MONOCYTES NFR BLD AUTO: 5 % (ref 4–12)
MV E'TISSUE VEL-LAT: 10 CM/S
MV E'TISSUE VEL-SEP: 7 CM/S
MV MEAN GRADIENT: 1 MMHG
MV PEAK A VEL: 0.65 M/S
MV PEAK E VEL: 96 CM/S
MV PEAK GRADIENT: 3 MMHG
MV STENOSIS PRESSURE HALF TIME: 72 MS
MV VALVE AREA BY CONTINUITY EQUATION: 2.65 CM2
MV VALVE AREA P 1/2 METHOD: 3.06 CM2
NEUTROPHILS # BLD AUTO: 5.87 THOUSANDS/ΜL (ref 1.85–7.62)
NEUTS SEG NFR BLD AUTO: 82 % (ref 43–75)
NRBC BLD AUTO-RTO: 0 /100 WBCS
PLATELET # BLD AUTO: 220 THOUSANDS/UL (ref 149–390)
PMV BLD AUTO: 10.5 FL (ref 8.9–12.7)
POTASSIUM SERPL-SCNC: 3.6 MMOL/L (ref 3.5–5.3)
PROT SERPL-MCNC: 7.4 G/DL (ref 6.4–8.2)
PROTHROMBIN TIME: 19.8 SECONDS (ref 11.6–14.5)
RBC # BLD AUTO: 4.43 MILLION/UL (ref 3.88–5.62)
RIGHT ATRIAL 2D VOLUME: 65 ML
RIGHT ATRIUM AREA SYSTOLE A4C: 21.9 CM2
RIGHT VENTRICLE ID DIMENSION: 4.5 CM
SL CV LEFT ATRIUM LENGTH A2C: 6.6 CM
SL CV LV EF: 60
SODIUM SERPL-SCNC: 137 MMOL/L (ref 136–145)
WBC # BLD AUTO: 7.2 THOUSAND/UL (ref 4.31–10.16)

## 2022-03-07 PROCEDURE — 80053 COMPREHEN METABOLIC PANEL: CPT | Performed by: INTERNAL MEDICINE

## 2022-03-07 PROCEDURE — 99232 SBSQ HOSP IP/OBS MODERATE 35: CPT | Performed by: NEUROLOGICAL SURGERY

## 2022-03-07 PROCEDURE — 85610 PROTHROMBIN TIME: CPT | Performed by: INTERNAL MEDICINE

## 2022-03-07 PROCEDURE — 85730 THROMBOPLASTIN TIME PARTIAL: CPT | Performed by: INTERNAL MEDICINE

## 2022-03-07 PROCEDURE — 93325 DOPPLER ECHO COLOR FLOW MAPG: CPT | Performed by: INTERNAL MEDICINE

## 2022-03-07 PROCEDURE — 93321 DOPPLER ECHO F-UP/LMTD STD: CPT | Performed by: INTERNAL MEDICINE

## 2022-03-07 PROCEDURE — 99232 SBSQ HOSP IP/OBS MODERATE 35: CPT | Performed by: INTERNAL MEDICINE

## 2022-03-07 PROCEDURE — 93308 TTE F-UP OR LMTD: CPT | Performed by: INTERNAL MEDICINE

## 2022-03-07 PROCEDURE — 85025 COMPLETE CBC W/AUTO DIFF WBC: CPT | Performed by: INTERNAL MEDICINE

## 2022-03-07 RX ORDER — DEXAMETHASONE SODIUM PHOSPHATE 4 MG/ML
4 INJECTION, SOLUTION INTRA-ARTICULAR; INTRALESIONAL; INTRAMUSCULAR; INTRAVENOUS; SOFT TISSUE EVERY 6 HOURS SCHEDULED
Status: DISCONTINUED | OUTPATIENT
Start: 2022-03-07 | End: 2022-03-11

## 2022-03-07 RX ADMIN — FLECAINIDE ACETATE 100 MG: 100 TABLET ORAL at 17:53

## 2022-03-07 RX ADMIN — DULOXETINE HYDROCHLORIDE 60 MG: 60 CAPSULE, DELAYED RELEASE ORAL at 21:24

## 2022-03-07 RX ADMIN — SODIUM CHLORIDE 75 ML/HR: 9 INJECTION, SOLUTION INTRAVENOUS at 08:55

## 2022-03-07 RX ADMIN — ATORVASTATIN CALCIUM 40 MG: 40 TABLET, FILM COATED ORAL at 16:04

## 2022-03-07 RX ADMIN — FLECAINIDE ACETATE 100 MG: 100 TABLET ORAL at 08:56

## 2022-03-07 RX ADMIN — HEPARIN SODIUM 13 UNITS/KG/HR: 10000 INJECTION, SOLUTION INTRAVENOUS; SUBCUTANEOUS at 05:35

## 2022-03-07 RX ADMIN — DEXAMETHASONE SODIUM PHOSPHATE 4 MG: 4 INJECTION INTRA-ARTICULAR; INTRALESIONAL; INTRAMUSCULAR; INTRAVENOUS; SOFT TISSUE at 17:53

## 2022-03-07 RX ADMIN — METOPROLOL SUCCINATE 100 MG: 100 TABLET, EXTENDED RELEASE ORAL at 08:55

## 2022-03-07 RX ADMIN — TAMSULOSIN HYDROCHLORIDE 0.4 MG: 0.4 CAPSULE ORAL at 16:04

## 2022-03-07 RX ADMIN — SODIUM CHLORIDE 75 ML/HR: 9 INJECTION, SOLUTION INTRAVENOUS at 22:34

## 2022-03-07 RX ADMIN — DEXAMETHASONE SODIUM PHOSPHATE 10 MG: 4 INJECTION INTRA-ARTICULAR; INTRALESIONAL; INTRAMUSCULAR; INTRAVENOUS; SOFT TISSUE at 08:55

## 2022-03-07 NOTE — PLAN OF CARE
Problem: MOBILITY - ADULT  Goal: Maintain or return to baseline ADL function  Description: INTERVENTIONS:  -  Assess patient's ability to carry out ADLs; assess patient's baseline for ADL function and identify physical deficits which impact ability to perform ADLs (bathing, care of mouth/teeth, toileting, grooming, dressing, etc )  - Assess/evaluate cause of self-care deficits   - Assess range of motion  - Assess patient's mobility; develop plan if impaired  - Assess patient's need for assistive devices and provide as appropriate  - Encourage maximum independence but intervene and supervise when necessary  - Involve family in performance of ADLs  - Assess for home care needs following discharge   - Consider OT consult to assist with ADL evaluation and planning for discharge  - Provide patient education as appropriate  Outcome: Progressing  Goal: Maintains/Returns to pre admission functional level  Description: INTERVENTIONS:  - Perform BMAT or MOVE assessment daily    - Set and communicate daily mobility goal to care team and patient/family/caregiver  - Collaborate with rehabilitation services on mobility goals if consulted  - Perform Range of Motion 3 times a day  - Reposition patient every 2 hours    - Dangle patient 3 times a day  - Stand patient 3 times a day  - Ambulate patient 3 times a day  - Out of bed to chair 3 times a day   - Out of bed for meals 3 times a day  - Out of bed for toileting  - Record patient progress and toleration of activity level   Outcome: Progressing     Problem: PAIN - ADULT  Goal: Verbalizes/displays adequate comfort level or baseline comfort level  Description: Interventions:  - Encourage patient to monitor pain and request assistance  - Assess pain using appropriate pain scale  - Administer analgesics based on type and severity of pain and evaluate response  - Implement non-pharmacological measures as appropriate and evaluate response  - Consider cultural and social influences on pain and pain management  - Notify physician/advanced practitioner if interventions unsuccessful or patient reports new pain  Outcome: Progressing     Problem: INFECTION - ADULT  Goal: Absence or prevention of progression during hospitalization  Description: INTERVENTIONS:  - Assess and monitor for signs and symptoms of infection  - Monitor lab/diagnostic results  - Monitor all insertion sites, i e  indwelling lines, tubes, and drains  - Monitor endotracheal if appropriate and nasal secretions for changes in amount and color  - Christopher appropriate cooling/warming therapies per order  - Administer medications as ordered  - Instruct and encourage patient and family to use good hand hygiene technique  - Identify and instruct in appropriate isolation precautions for identified infection/condition  Outcome: Progressing  Goal: Absence of fever/infection during neutropenic period  Description: INTERVENTIONS:  - Monitor WBC    Outcome: Progressing     Problem: SAFETY ADULT  Goal: Maintain or return to baseline ADL function  Description: INTERVENTIONS:  -  Assess patient's ability to carry out ADLs; assess patient's baseline for ADL function and identify physical deficits which impact ability to perform ADLs (bathing, care of mouth/teeth, toileting, grooming, dressing, etc )  - Assess/evaluate cause of self-care deficits   - Assess range of motion  - Assess patient's mobility; develop plan if impaired  - Assess patient's need for assistive devices and provide as appropriate  - Encourage maximum independence but intervene and supervise when necessary  - Involve family in performance of ADLs  - Assess for home care needs following discharge   - Consider OT consult to assist with ADL evaluation and planning for discharge  - Provide patient education as appropriate  Outcome: Progressing  Goal: Maintains/Returns to pre admission functional level  Description: INTERVENTIONS:  - Perform BMAT or MOVE assessment daily    - Set and communicate daily mobility goal to care team and patient/family/caregiver  - Collaborate with rehabilitation services on mobility goals if consulted  - Perform Range of Motion 3 times a day  - Reposition patient every 2 hours    - Dangle patient 3 times a day  - Stand patient 3 times a day  - Ambulate patient 3 times a day  - Out of bed to chair 3 times a day   - Out of bed for meals 3 times a day  - Out of bed for toileting  - Record patient progress and toleration of activity level   Outcome: Progressing  Goal: Patient will remain free of falls  Description: INTERVENTIONS:  - Educate patient/family on patient safety including physical limitations  - Instruct patient to call for assistance with activity   - Consult OT/PT to assist with strengthening/mobility   - Keep Call bell within reach  - Keep bed low and locked with side rails adjusted as appropriate  - Keep care items and personal belongings within reach  - Initiate and maintain comfort rounds  - Make Fall Risk Sign visible to staff  - Offer Toileting every 2 Hours, in advance of need  - Initiate/Maintain bed alarm  - Obtain necessary fall risk management equipment: walker  - Apply yellow socks and bracelet for high fall risk patients  - Consider moving patient to room near nurses station  Outcome: Progressing     Problem: DISCHARGE PLANNING  Goal: Discharge to home or other facility with appropriate resources  Description: INTERVENTIONS:  - Identify barriers to discharge w/patient and caregiver  - Arrange for needed discharge resources and transportation as appropriate  - Identify discharge learning needs (meds, wound care, etc )  - Arrange for interpretive services to assist at discharge as needed  - Refer to Case Management Department for coordinating discharge planning if the patient needs post-hospital services based on physician/advanced practitioner order or complex needs related to functional status, cognitive ability, or social support system  Outcome: Progressing     Problem: Knowledge Deficit  Goal: Patient/family/caregiver demonstrates understanding of disease process, treatment plan, medications, and discharge instructions  Description: Complete learning assessment and assess knowledge base  Interventions:  - Provide teaching at level of understanding  - Provide teaching via preferred learning methods  Outcome: Progressing     Problem: Neurological Deficit  Goal: Neurological status is stable or improving  Description: Interventions:  - Monitor and assess patient's level of consciousness, motor function, sensory function, and level of assistance needed for ADLs  - Monitor and report changes from baseline  Collaborate with interdisciplinary team to initiate plan and implement interventions as ordered  - Provide and maintain a safe environment  - Consider seizure precautions  - Consider fall precautions  - Consider aspiration precautions  - Consider bleeding precautions  Outcome: Progressing     Problem: Activity Intolerance/Impaired Mobility  Goal: Mobility/activity is maintained at optimum level for patient  Description: Interventions:  - Assess and monitor patient  barriers to mobility and need for assistive/adaptive devices  - Assess patient's emotional response to limitations  - Collaborate with interdisciplinary team and initiate plans and interventions as ordered  - Encourage independent activity per ability   - Maintain proper body alignment  - Perform active/passive rom as tolerated/ordered  - Plan activities to conserve energy   - Turn patient as appropriate  Outcome: Progressing     Problem: Communication Impairment  Goal: Ability to express needs and understand communication  Description: Assess patient's communication skills and ability to understand information  Patient will demonstrate use of effective communication techniques, alternative methods of communication and understanding even if not able to speak       - Encourage communication and provide alternate methods of communication as needed  - Collaborate with case management/ for discharge needs  - Include patient/family/caregiver in decisions related to communication  Outcome: Progressing     Problem: Potential for Aspiration  Goal: Non-ventilated patient's risk of aspiration is minimized  Description: Assess and monitor vital signs, respiratory status, and labs (WBC)  Monitor for signs of aspiration (tachypnea, cough, rales, wheezing, cyanosis, fever)  - Assess and monitor patient's ability to swallow  - Place patient up in chair to eat if possible  - HOB up at 90 degrees to eat if unable to get patient up into chair   - Supervise patient during oral intake  - Instruct patient/ family to take small bites  - Instruct patient/ family to take small single sips when taking liquids  - Follow patient-specific strategies generated by speech pathologist   Outcome: Progressing  Goal: Ventilated patient's risk of aspiration is minimized  Description: Assess and monitor vital signs, respiratory status, airway cuff pressure, and labs (WBC)  Monitor for signs of aspiration (tachypnea, cough, rales, wheezing, cyanosis, fever)  - Elevate head of bed 30 degrees if patient has tube feeding   - Monitor tube feeding  Outcome: Progressing     Problem: Nutrition  Goal: Nutrition/Hydration status is improving  Description: Monitor and assess patient's nutrition/hydration status for malnutrition (ex- brittle hair, bruises, dry skin, pale skin and conjunctiva, muscle wasting, smooth red tongue, and disorientation)  Collaborate with interdisciplinary team and initiate plan and interventions as ordered  Monitor patient's weight and dietary intake as ordered or per policy  Utilize nutrition screening tool and intervene per policy  Determine patient's food preferences and provide high-protein, high-caloric foods as appropriate       - Assist patient with eating   - Allow adequate time for meals   - Encourage patient to take dietary supplement as ordered  - Collaborate with clinical nutritionist   - Include patient/family/caregiver in decisions related to nutrition  Outcome: Progressing     Problem: Nutrition/Hydration-ADULT  Goal: Nutrient/Hydration intake appropriate for improving, restoring or maintaining nutritional needs  Description: Monitor and assess patient's nutrition/hydration status for malnutrition  Collaborate with interdisciplinary team and initiate plan and interventions as ordered  Monitor patient's weight and dietary intake as ordered or per policy  Utilize nutrition screening tool and intervene as necessary  Determine patient's food preferences and provide high-protein, high-caloric foods as appropriate       INTERVENTIONS:  - Monitor oral intake, urinary output, labs, and treatment plans  - Assess nutrition and hydration status and recommend course of action  - Evaluate amount of meals eaten  - Assist patient with eating if necessary   - Allow adequate time for meals  - Recommend/ encourage appropriate diets, oral nutritional supplements, and vitamin/mineral supplements  - Order, calculate, and assess calorie counts as needed  - Recommend, monitor, and adjust tube feedings and TPN/PPN based on assessed needs  - Assess need for intravenous fluids  - Provide specific nutrition/hydration education as appropriate  - Include patient/family/caregiver in decisions related to nutrition  Outcome: Progressing

## 2022-03-07 NOTE — OCCUPATIONAL THERAPY NOTE
Occupational Therapy         Patient Name: Reyes Dash  LVGBK'G Date: 3/7/2022       03/07/22 0900   OT Last Visit   OT Visit Date 03/07/22   Note Type   Note type Evaluation   Cancel Reasons Medical status     OT consult received, chart reviewed  Pt admitted with cervical myelopathy and per tiger text to Neurosurgery pt is pending surgical intervention once medically cleared  Will discharge OT at this time, please re-consult post surgery   Demar ANGUIANO OTR/L

## 2022-03-07 NOTE — PHYSICAL THERAPY NOTE
Physical Therapy Cancellation Note and Discharge of Orders    PT consult received  Chart reviewed  Patient is pending neurosurgical intervention this week  Bryan Texted neurosurgery resident who reported that PT evaluation should be held until post-op  PT will d/c order  Please re-consult post-op  Thank you      AFIA CHAUHAN PT, DPT

## 2022-03-07 NOTE — UTILIZATION REVIEW
Initial Clinical Review    Admission: Date/Time/Statement:   Admission Orders (From admission, onward)     Ordered        03/05/22 2019  Inpatient Admission  Once                      Orders Placed This Encounter   Procedures    Inpatient Admission     Standing Status:   Standing     Number of Occurrences:   1     Order Specific Question:   Level of Care     Answer:   Med Surg [16]     Order Specific Question:   Estimated length of stay     Answer:   More than 2 Midnights     Order Specific Question:   Certification     Answer:   I certify that inpatient services are medically necessary for this patient for a duration of greater than two midnights  See H&P and MD Progress Notes for additional information about the patient's course of treatment  3/4/2022 - 3/5/2022 (28 hours)  [place]  Cervical myelopathy, ambulatory dysfunction, severe cervical stenosis  [facility] for higher level of care   Start iv decadron, plan for neurosurgical evaluation  Consult neuro surgery    Patient reports new onset right UE and LE progressive weakness with no inciting etiology over the last month, quicker progression over the last week necessitating using a cane and falls  - Prior to a month ago states was ambulating without any assistive device and no falls  - He does have chronic bl knee arthritis pending knee surgery but states this current weakness is new- not associated with his knee pain  - Also reporting saddle anesthesia new over the last week and increasing urinary urgency   -  2  Patient can be transferred to [facility] under Internal Medicine for neurosurgical consultation and intervention during the admission  Apparently, the patient may be considering going home  If so, we will try to arrange for early follow-up this week in the office to discuss surgical intervention  In the interim could be started on a Decadron tapering dose    3  Patient should be educated to avoid activities that result in extreme range of motion of the cervical spine as this could lead to further neurological decline spinal cord injury  Patient should also return to the emergency department with progressive symptoms  4  Patient is on Coumadin for factor 5 Leiden  This would need to be held prior to any surgical intervention, but could be we started shortly after intervention pending progress  Initial Presentation:       71year old male received from [facility] for inpatient med surg admission to further evaluate and treat C5-6 severe  cervical stenosis contributing to ambulation dysfunction and right sided weakness  PMHX WILL/ CPAP, PAF ON COUMADIN, BL KNEE OA  Plan to continue iv steroids, hold coumadin, consult neuro surgery  Consult neuro surgery  3-6   Presents with progressive decline in gait and balance and right upper extremity function  Overall presentation concerning for myelopathy  MRI demonstrates stenosis most severe at C5-6 with some cord signal change  Patient will likely require surgical intervention  Continue medical optimization  All the patient's and his wife's questions were answered to their satisfaction and they are agreeable to this course of action  Date: 3-7-22   Day 2: inpatient med surg  Initiate heparin gtt, continue iv steroids  Continue neuro assessments  Moderate sensation on right upper and lower extremities, decreased strength and sensation on the right  PT/OT evaluations postponed pending neuro surgical procedure      ED Triage Vitals   03/05/22 2036 03/05/22 2036 03/06/22 1300 03/05/22 2036 03/05/22 2036   98 °F (36 7 °C) 71 18 154/86 95 %       Monitor         No Pain          03/04/22 112 kg (248 lb)     Additional Vital Signs:     Date/Time Temp Pulse Resp BP MAP SpO2 O2 Device   03/07/22 08:53:08 -- 66 -- 146/82 103 97 % --   03/07/22 07:33:28 97 8 °F (36 6 °C) 69 -- 147/86 106 95 % --   03/07/22 03:32:07 -- 74 -- 147/86 106 97 % --   03/07/22 0332 -- 72 -- 147/86 106 97 % --   03/06/22 23:03:54 -- 88 -- 159/103   Abnormal  122 93 % --   03/06/22 1903 -- -- -- -- -- -- None (Room air)   03/06/22 19:00:09 97 8 °F (36 6 °C) 83 -- 146/83 104 93 % --   03/06/22 1900 -- -- -- -- -- -- None (Room air)   03/06/22 15:48:11 97 9 °F (36 6 °C) 76 -- 148/83 105 94 % --   03/06/22 15:47:17 97 9 °F (36 6 °C) 78 -- 148/83 105 95 % --   03/06/22 1300 97 7 °F (36 5 °C) 61 18 164/81 -- 95 % --   03/06/22 09:52:06 -- 70 -- 161/88 112 96 % --   03/06/22 08:05:14 97 3 °F (36 3 °C)   Abnormal  61 -- 157/84 108 96 % --   03/06/22 0800 -- -- -- -- -- -- None (Room air)   03/06/22 0700 -- 61 -- 112/75 87 91 % --   03/06/22 0600 -- 63 -- 157/77 104 92 % --   03/06/22 0500 -- -- -- 158/78 105 -- --   03/06/22 0400 -- 57 -- 160/78 105 98 % --   03/06/22 0300 -- 63 -- 157/78 104 96 % --   03/06/22 0200 -- 61 -- -- -- 97 % --   03/06/22 0100 -- 66 -- -- -- 93 % --   03/06/22 0045 -- 63 -- 162/77 105 96 % --   03/06/22 0000 -- 60 -- -- -- 97 % --   03/05/22 2345 -- 62 -- 125/77 93 98 % --   03/05/22 2245 -- 65 -- 107/79 88 95 % --   03/05/22 2145 -- 63 -- 150/83 105 96 % --   03/05/22 2100 -- -- -- -- -- -- None (Room air)   03/05/22 2045 -- 72 -- 155/84 108 94 % --   03/05/22 20:36:33 98 °F (36 7 °C) 71 -- 154/86 109 95 % --       Date and Time Eye Opening Best Verbal Response Best Motor Response Heber Coma Scale Score   03/07/22 0300 4 5 6 15   03/06/22 2300 4 5 6 15   03/06/22 1903 4 5 6 15   03/06/22 1900 4 5 6 15   03/06/22 1200 4 5 6 15   03/06/22 0800 4 5 6 15   03/06/22 0700 4 5 6 15   03/06/22 0500 4 5 6 15   03/06/22 0300 4 5 6 15   03/06/22 0100 4 5 6 15   03/06/22 0000 4 5 6 15             Pertinent Labs/Diagnostic Test Results:     [address] [facility] admission    · CT cervical spine: "Multilevel degenerative changes causing multilevel neural foraminal and spinal canal stenosis as described  Suspect asymmetric soft tissue in the right paraspinal region  Correlate with contrast-enhanced cervical spine MRI recommended to exclude paraspinal soft tissue infection "  · Obtain MRI cervical spine: "No acute abnormality  Motion limited    Multilevel degenerative spondylosis progressed from 2013 most severe at C5-6 with severe canal stenosis and flattening of the cord  Small focus of T2 signal within the cord at C5-C6 may represent myelomalacia  Moderate canal stenosis at C3-4 mildly indents the cord without signal abnormality  Marked multilevel foraminal stenosis  No prevertebral edema or mass "  · MRI brain negative acute CVA            Results from last 7 days   Lab Units 03/07/22  0512 03/06/22  1326 03/06/22  0448 03/05/22  0414 03/05/22  0414 03/04/22  1454 03/04/22  1454   WBC Thousand/uL 7 20 4 32 4 29*   < > 4 40   < > 5 22   HEMOGLOBIN g/dL 13 4 14 4 14 0  --  13 2  --  14 1   HEMATOCRIT % 41 1 42 5 40 2  --  40 9  --  42 9   PLATELETS Thousands/uL 220 206 191   < > 189   < > 221   NEUTROS ABS Thousands/µL 5 87  --  2 03  --  2 03   < > 2 82    < > = values in this interval not displayed           Results from last 7 days   Lab Units 03/07/22  0512 03/06/22  0448 03/05/22  0414 03/04/22  1454   SODIUM mmol/L 137 137 137 136   POTASSIUM mmol/L 3 6 3 4* 3 8 4 2   CHLORIDE mmol/L 106 105 102 101   CO2 mmol/L 24 27 29 31   ANION GAP mmol/L 7 5 6 4   BUN mg/dL 13 12 12 11   CREATININE mg/dL 0 68 0 66 0 76 0 76   EGFR ml/min/1 73sq m 96 97 91 91   CALCIUM mg/dL 9 3 9 1 8 6 9 2     Results from last 7 days   Lab Units 03/07/22  0512 03/04/22  1454   AST U/L 16 20   ALT U/L 18 24   ALK PHOS U/L 62 69   TOTAL PROTEIN g/dL 7 4 7 8   ALBUMIN g/dL 3 5 4 0   TOTAL BILIRUBIN mg/dL 0 63 0 80     Results from last 7 days   Lab Units 03/04/22  1520   POC GLUCOSE mg/dl 84     Results from last 7 days   Lab Units 03/07/22  0512 03/06/22  0448 03/05/22  0414 03/04/22  1454   GLUCOSE RANDOM mg/dL 148* 123 104 98         Results from last 7 days   Lab Units 03/05/22  0414   HEMOGLOBIN A1C % 6 3*   EAG mg/dl 134       Results from last 7 days   Lab Units 03/04/22  1454   HS TNI 0HR ng/L 3         Results from last 7 days   Lab Units 03/07/22  0512 03/07/22  0327 03/06/22 2015 03/06/22  1326 03/06/22  0448 03/04/22  1454   PROTIME seconds 19 8*  --   --  20 3* 21 4*  --    INR  1 77*  --   --  1 83* 1 96*  --    PTT seconds  --  113* 101* 31  --    < >    < > = values in this interval not displayed       Results from last 7 days   Lab Units 03/05/22  0414   TSH 3RD GENERATON uIU/mL 2 091       Results from last 7 days   Lab Units 03/04/22  1454   NT-PRO BNP pg/mL 121         Results from last 7 days   Lab Units 03/05/22  2153   CLARITY UA  Clear   COLOR UA  Light Yellow   SPEC GRAV UA  1 016   PH UA  6 5   GLUCOSE UA mg/dl Negative   KETONES UA mg/dl Negative   BLOOD UA  Negative   PROTEIN UA mg/dl Negative   NITRITE UA  Negative   BILIRUBIN UA  Negative   UROBILINOGEN UA (BE) mg/dl 3 0*   LEUKOCYTES UA  Negative       ED Treatment:   Medication Administration - No Administrations Displayed (No Start Event Found)     None        Past Medical History:   Diagnosis Date    Acute venous embolism and thrombosis of deep vessels of proximal lower extremity (HCC)     Last assessed: 5/18/15    Arthritis     Cellulitis     LE    CPAP (continuous positive airway pressure) dependence     Factor V Leiden ([facility] [address] )     Forgetfulness     [place] (hard of hearing)     Paroxysmal atrial fibrillation (HCC)     Last assessed: 11/2/15    Sleep apnea      Present on Admission:   Cervical myelopathy (HCC)   Ambulatory dysfunction   Paroxysmal A-fib (HCC)   WILL (obstructive sleep apnea)   Essential hypertension   Mixed hyperlipidemia      Admitting Diagnosis: Cervical myelopathy (HCC) [G95 9]     Age/Sex: 71 y o  male    Scheduled Medications:    atorvastatin, 40 mg, Oral, QPM  dexamethasone, 10 mg, Intravenous, Daily  DULoxetine, 60 mg, Oral, HS  flecainide, 100 mg, Oral, BID  metoprolol succinate, 100 mg, Oral, Daily  tamsulosin, 0 4 mg, Oral, Daily With Dinner      Continuous IV Infusions:  heparin (porcine), 3-30 Units/kg/hr (Order-Specific), Intravenous, Titrated  sodium chloride, 75 mL/hr, Intravenous, Continuous      PRN Meds:  acetaminophen, 650 mg, Oral, Q4H PRN        IP CONSULT TO NUTRITION SERVICES  IP CONSULT TO CASE MANAGEMENT  IP CONSULT TO NEUROSURGERY    Network Utilization Review Department  ATTENTION: Please call with any questions or concerns to [phone] and carefully listen to the prompts so that you are directed to the right person  All voicemails are confidential   Chepe Linder all requests for admission clinical reviews, approved or denied determinations and any other requests to dedicated fax number below belonging to the campus where the patient is receiving treatment   List of dedicated fax numbers for the Facilities:  [address] DENIALS (Administrative/Medical Necessity) [phone]   [address] (Maternity/NICU/Pediatrics) [phone]   [address]  [address] [place] [address] [id number] [place] [address] [address] [facility] [place] [address] [phone]

## 2022-03-07 NOTE — ASSESSMENT & PLAN NOTE
Continue heart rate control medications  Hold warfarin as per Neurosurgery recommendation  On heparin

## 2022-03-07 NOTE — ASSESSMENT & PLAN NOTE
Severe canal stenosis C5-6 with small area of signal abnormality   Presented with significant decline in ambulation along with numbness and weakness R>L over the last week  Reports multiple falls  Imaging:    MRI cervical spine with without 3/5/2022: Motion limited  Multilevel degenerative spondylosis progressed from 2013 with most severe canal stenosis noted at C5-6 with flattening of the cord and small focus of T2 signal abnormality likely representing myelomalacia  Moderate canal stenosis at C3-4 which mildly indents the cord without signal abnormality  Plan:   Continue monitor neurological exam and symptoms   Discussed etiology of patient's symptoms correlating with cervical stenosis   Patient on a trial of steroids  Currently ordered Decadron 10mg IV daily  Consider 4mg q 6   Discussed need for surgical intervention and anticipated anterior cervical diskectomy fixation and fusion  Procedure along with risks and benefits discussed   Patient will need a medical optimization and medical clearance   Given 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, prior anticoagulation (Coumadin) is currently hold  Deferred reversing secondary to clotting disorder  3/7/22  INR at 1 77,  [place] INR for surgery is < 1 4   (therapeutic) on Heparin   Timing for surgical intervention TBD - anticipate later this week  Neurosurgery will continue to follow  Call with any questions/concerns

## 2022-03-07 NOTE — ASSESSMENT & PLAN NOTE
Continue home blood pressure meds with holding parameters  BP is 146/86  Will continue BB   May need additional meds if sustained elevated BPs  Now also on steroids which can bring BP up - but currently okay

## 2022-03-07 NOTE — UTILIZATION REVIEW
Pending [facility]#[id number]    Initial Clinical Review    Admission: Date/Time/Statement:   Admission Orders (From admission, onward)     Ordered        03/04/22 1659  Inpatient Admission  Once                      Orders Placed This Encounter   Procedures    Inpatient Admission     Standing Status:   Standing     Number of Occurrences:   1     Order Specific Question:   Level of Care     Answer:   Med Surg [16]     Order Specific Question:   Estimated length of stay     Answer:   More than 2 Midnights     Order Specific Question:   Certification     Answer:   I certify that inpatient services are medically necessary for this patient for a duration of greater than two midnights  See H&P and MD Progress Notes for additional information about the patient's course of treatment  ED Arrival Information     Expected Arrival Acuity    - 3/4/2022 14:18 Emergent         Means of arrival Escorted by Service Admission type    Wheelchair Spouse Hospitalist Emergency         Arrival complaint    right sided weakness, both legs are weak        Chief Complaint   Patient presents with    Extremity Weakness     dayne presents to ED with bilateral leg weakness  patient states she is scheudled to have both knees replaced and was given a cane for ambulating   Neck Pain     patient also complaining of right sided neck stiffness and pain that causing issues with right shoulder not being able to be raised and slight numbness to right hand      Initial Presentation:   Mr Charles Moran is a 71 yom who presents to the ED from home with c/o ambulatory dysfunction and weakness x several days with unsteady and wobbly gait  Recent cellulitis on antibiotics with improvement  Urinary urgency and incontinence  No flank pain, dysuria    Also c/o bilateral lower extremity weakness worse on the right and decreased right hand  for past 3 weeks with intermittent right sided neck pains associated with decreased sensation over the 4th and 5th digits of right hand and difficulty making fist on R hand  PMH: HTN, paroxysmal AFib, obesity, osteoarthritis  In the ED imaging is negative  He is admitted to INPATIENT status with Weakness - MRI Brain, CT C spine, coumadin continues, Start Lipitor, neuro consult, frequent neuro checks  Ambulatory dysfunction - PT/OT eval      Date: 3/5   Day 2:   MRI C spine completed and shows no acute abnormality - full report below  Pt reports improved motion of R shoulder  Still cannot clench fist   Mild numbness and tingling from shoulder distally  Neuro consult pending  Transfer orders are written if pt decides to remain in hospital      3/5 Neuro Consult - cervical myelopathy with new onset RUE and RLE progressive weakness with no inciting events  Discussed case with neurosurgery and plan is for transfer to [facility] for neurosurgical eval and possible neurosurgery intervention  Will bridge with Heparin, start IV Decadron 10 mg IV then 4 mg q 6 hr and slowly titrate down  Pt choice is to go home and f/u in office in a week and then OR when he can be scheduled  MRI Brain is not acute, Stroke alert canceled  3/5 Neurosurgery Telemed Consult  - progressive C myelopathy d/t C5-C6t stenosis with cord compression with progressive decline  Recommend surgery relatively soon  He can be transferred to [facility] but he wants to go home and if he goes home with see in office next week  Start on Decadron tapering dose  Needs to avoid activities with extreme ROM of C spine  Return to ED with any progressive symptoms  Hold Coumadin for factor V Leiden        ED Triage Vitals   Temperature Pulse Respirations Blood Pressure SpO2   03/04/22 1444 03/04/22 1444 03/04/22 1444 03/04/22 1444 03/04/22 1444   97 8 °F (36 6 °C) 59 16 123/76 98 %      Temp src Heart Rate Source Patient Position - Orthostatic VS BP Location FiO2 (%)   -- 03/04/22 1444 03/04/22 1444 03/04/22 1444 --    Monitor Sitting Left arm       Pain Score       03/04/22 1839       5          Wt Readings from Last 1 Encounters:   03/04/22 112 kg (248 lb)     Additional Vital Signs:   03/05/22 0900 -- -- -- -- -- 93 % None (Room air) --   03/04/22 22:03:56 98 1 °F (36 7 °C) 54 Abnormal  19 141/68 92 99 % -- --   03/04/22 19:12:41 98 3 °F (36 8 °C) 53 Abnormal  18 166/86 113 98 % -- --   03/04/22 1801 -- 54 Abnormal  -- 156/70 -- -- -- --   03/04/22 1730 -- 51 Abnormal  18 156/70 -- 98 % None (Room air) Lying   03/04/22 1645 -- 51 Abnormal  18 172/70 Abnormal  101 100 % None (Room air) Lying     Pertinent Labs/Diagnostic Test Results:     3/4 ECG - SB with 1st degree AV block    3/4 Echo  - P    3/5 MRI C spine - No acute abnormality  Motion limited  Multilevel degenerative spondylosis progressed from 2013 most severe at C5-6 with severe canal stenosis and flattening of the  cord  Small focus of T2 signal within the cord at C5-C6 may represent myelomalacia  Moderate canal stenosis at C3-4 mildly indents the cord without signal abnormality  Marked multilevel foraminal stenosis  No prevertebral edema or mass  MRI brain wo contrast   Final Result by Juan Toussaint MD (03/05 1056)      No acute intracranial pathology  Mild chronic microangiopathy  CT recon only cervical spine (No Charge)   Final Result by Keesha Patel MD (03/04 2134)      Multilevel degenerative changes causing multilevel neural foraminal and spinal canal stenosis as described  Suspect asymmetric soft tissue in the right paraspinal region  Correlate with contrast-enhanced cervical spine MRI recommended to exclude paraspinal soft tissue infection  CTA head and neck with and without contrast   Final Result by Keesha Patel MD (03/04 3822)         1  No evidence of acute intracranial hemorrhage  2  No evidence of occlusive disease  XR chest portable   Final Result by Nilton Lester MD (03/04 4222)      No acute cardiopulmonary disease              Results from last 7 days   Lab Units 03/07/22  0512 03/06/22  1326 03/06/22  0448 03/05/22  0414 03/05/22  0414 03/04/22  1454 03/04/22  1454   WBC Thousand/uL 7 20 4 32 4 29*   < > 4 40   < > 5 22   HEMOGLOBIN g/dL 13 4 14 4 14 0  --  13 2  --  14 1   HEMATOCRIT % 41 1 42 5 40 2  --  40 9  --  42 9   PLATELETS Thousands/uL 220 206 191   < > 189   < > 221   NEUTROS ABS Thousands/µL 5 87  --  2 03  --  2 03   < > 2 82    < > = values in this interval not displayed  Results from last 7 days   Lab Units 03/07/22  0512 03/06/22 0448 03/05/22 0414 03/04/22  1454   SODIUM mmol/L 137 137 137 136   POTASSIUM mmol/L 3 6 3 4* 3 8 4 2   CHLORIDE mmol/L 106 105 102 101   CO2 mmol/L 24 27 29 31   ANION GAP mmol/L 7 5 6 4   BUN mg/dL 13 12 12 11   CREATININE mg/dL 0 68 0 66 0 76 0 76   EGFR ml/min/1 73sq m 96 97 91 91   CALCIUM mg/dL 9 3 9 1 8 6 9 2     Results from last 7 days   Lab Units 03/07/22  0512 03/04/22  1454   AST U/L 16 20   ALT U/L 18 24   ALK PHOS U/L 62 69   TOTAL PROTEIN g/dL 7 4 7 8   ALBUMIN g/dL 3 5 4 0   TOTAL BILIRUBIN mg/dL 0 63 0 80     Results from last 7 days   Lab Units 03/04/22  1520   POC GLUCOSE mg/dl 84     Results from last 7 days   Lab Units 03/07/22  0512 03/06/22 0448 03/05/22  0414 03/04/22  1454   GLUCOSE RANDOM mg/dL 148* 123 104 98         Results from last 7 days   Lab Units 03/05/22  0414   HEMOGLOBIN A1C % 6 3*   EAG mg/dl 134     Results from last 7 days   Lab Units 03/04/22  1454   HS TNI 0HR ng/L 3         Results from last 7 days   Lab Units 03/07/22  0512 03/07/22  0327 03/06/22 2015 03/06/22  1326 03/06/22  0448 03/04/22  1454   PROTIME seconds 19 8*  --   --  20 3* 21 4*  --    INR  1 77*  --   --  1 83* 1 96*  --    PTT seconds  --  113* 101* 31  --    < >    < > = values in this interval not displayed       Results from last 7 days   Lab Units 03/05/22  0414   TSH 3RD GENERATON uIU/mL 2 091                     Results from last 7 days   Lab Units 03/04/22  1454   NT-PRO BNP pg/mL 121   ED Treatment: Medication Administration from 03/04/2022 1417 to 03/04/2022 1808    Date/Time Order Dose Route Action   03/04/2022 1647 iohexol (OMNIPAQUE) 350 MG/ML injection (SINGLE-DOSE) 100 mL 85 mL Intravenous Given        Past Medical History:   Diagnosis Date    Acute venous embolism and thrombosis of deep vessels of proximal lower extremity (HCC)     Last assessed: 5/18/15    Arthritis     Cellulitis     LE    CPAP (continuous positive airway pressure) dependence     Factor V Leiden ([facility] )     Forgetfulness     [place] hearing[place]     Paroxysmal atrial fibrillation (HCC)     Last assessed: 11/2/15    Sleep apnea      Present on Admission:   Essential hypertension   Ambulatory dysfunction   WILL (obstructive sleep apnea)   Dyslipidemia   Cervical myelopathy (HCC)   Factor V Leiden mutation ([facility] )      Admitting Diagnosis: Weakness [R53 1]  Leg weakness [R29 898]  Ambulatory dysfunction [R26 2]  Age/Sex: 71 y o  male  Admission Orders:  Scheduled Medications:  No current facility-administered medications for this encounter  Continuous IV Infusions:  No current facility-administered medications for this encounter  PRN Meds:  acetaminophen, 650 mg, Oral, Q4H PRN - x 1 3/4    Up w/ assist   Neuro checks Every 1 hour x 4 hours, then every 2 hours x 4, then every 4 hours x 72 hours                  Vitals q 4 hr   IP CONSULT TO NEUROLOGY    Network Utilization Review Department  ATTENTION: Please call with any questions or concerns to [phone] and carefully listen to the prompts so that you are directed to the right person  All voicemails are confidential   Chepe Linder all requests for admission clinical reviews, approved or denied determinations and any other requests to dedicated fax number below belonging to the campus where the patient is receiving treatment   List of dedicated fax numbers for the Facilities:  FACILITY NAME [facility]ONNELL [place] 25 DENIALS (Administrative/Medical Necessity) [phone] [address] (Maternity/NICU/Pediatrics) 261 [facility] Floor [facility] 40 125 [facility]  [phone]   [address] [facility] [place] [id number] [address] Bianca Holt 1481 [address] [facility] [address] [facility] [phone]

## 2022-03-07 NOTE — PLAN OF CARE
Problem: PAIN - ADULT  Goal: Verbalizes/displays adequate comfort level or baseline comfort level  Description: Interventions:  - Encourage patient to monitor pain and request assistance  - Assess pain using appropriate pain scale  - Administer analgesics based on type and severity of pain and evaluate response  - Implement non-pharmacological measures as appropriate and evaluate response  - Consider cultural and social influences on pain and pain management  - Notify physician/advanced practitioner if interventions unsuccessful or patient reports new pain  Outcome: Progressing     Problem: INFECTION - ADULT  Goal: Absence or prevention of progression during hospitalization  Description: INTERVENTIONS:  - Assess and monitor for signs and symptoms of infection  - Monitor lab/diagnostic results  - Monitor all insertion sites, i e  indwelling lines, tubes, and drains  - Monitor endotracheal if appropriate and nasal secretions for changes in amount and color  - [place] appropriate cooling/warming therapies per order  - Administer medications as ordered  - Instruct and encourage patient and family to use good hand hygiene technique  - Identify and instruct in appropriate isolation precautions for identified infection/condition  Outcome: Progressing

## 2022-03-07 NOTE — ASSESSMENT & PLAN NOTE
Secondary to knee osteoarthritis and #1  Management as above  PT OT evaluate -discussed with neurosurgery okay for cautious mobilizing with PT     Defer PT recs and mobility recs to neurosurgery

## 2022-03-07 NOTE — ASSESSMENT & PLAN NOTE
Patient presented with bilateral lower extremity weakness and right-sided neck discomfort right arm numbness tingling x 3 weeks  CTA head and neck, MRI of the brain wwerte unremarkable for acute pathology  CT cervical spine: "Multilevel degenerative changes causing multilevel neural foraminal and spinal canal stenosis as described  Suspect asymmetric soft tissue in the right paraspinal region  Correlate with contrast-enhanced cervical spine MRI recommended to exclude paraspinal soft tissue infection "  MRI cervical spine: "No acute abnormality  Motion limited    Multilevel degenerative spondylosis progressed from 2013 most severe at C5-6 with severe canal stenosis and flattening of the cord  Small focus of T2 signal within the cord at C5-C6 may represent myelomalacia  Moderate canal stenosis at C3-4 mildly indents the cord without signal abnormality  Marked multilevel foraminal stenosis  No prevertebral edema or mass "  According to transferring notes " Discussed with neurosurgery Dr Alexy Hernandez; recommending transfer to [facility] with neurosurgery consult for surgical intervention  Recommended holding coumadin for now  Surgical intervention timing TBD    [place] INR daily "  Continue Decadron 10 mg IV daily  Fall precaution    Discussed with neurosurgery - okay to start gtt and to order diet today   Also will defer steroid dosing to neurosurgery

## 2022-03-07 NOTE — PROGRESS NOTES
[facility]  Progress Note - Nilesh Taveras 1950, 71 y o  male MRN: [id number]  Unit/Bed#: -01 Encounter: [id number]  Primary Care Provider: Carlos Carlton MD   Date and time admitted to hospital: 3/5/2022  8:18 PM    * Cervical myelopathy [facility])  Assessment & Plan  Severe canal stenosis C5-6 with small area of signal abnormality   Presented with significant decline in ambulation along with numbness and weakness R>L over the last week  Reports multiple falls  Imaging:    MRI cervical spine with without 3/5/2022: Motion limited  Multilevel degenerative spondylosis progressed from 2013 with most severe canal stenosis noted at C5-6 with flattening of the cord and small focus of T2 signal abnormality likely representing myelomalacia  Moderate canal stenosis at C3-4 which mildly indents the cord without signal abnormality  Plan:   Continue monitor neurological exam and symptoms   Discussed etiology of patient's symptoms correlating with cervical stenosis   Patient on a trial of steroids  Currently ordered Decadron 10mg IV daily  Consider 4mg q 6   Discussed need for surgical intervention and anticipated anterior cervical diskectomy fixation and fusion  Procedure along with risks and benefits discussed   Patient will need a medical optimization and medical clearance   Given 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, prior anticoagulation (Coumadin) is currently hold  Deferred reversing secondary to clotting disorder  3/7/22  INR at 1 77,  [place] INR for surgery is < 1 4   (therapeutic) on Heparin   Timing for surgical intervention TBD - anticipate later this week  Neurosurgery will continue to follow  Call with any questions/concerns  Subjective/Objective     Chief Complaint: "My hands are still numb and weak"    Subjective: Pt reports he continues to have hand numbness and weakness   He reports numbness in the left 3rd - 5th digits and all the whole right hand/digits  He reports that weakness in the right hand is worse than the left  Pt also reports gait imbalance since his sx began and reports that he still requires assistance to get up and even with the walker he is not steady on his feet  At present he denies having neck pain but reports that overnight he got mild neck pain when turning his neck  Pt reports some urinary incontinence secondary to his weak hands and gait imbalance he is not able to get the urinal fast enough sometimes or get to the bathroom fast enough  He reports he has had some improvement now that he knows he needs to act fast      Objective: Alert and awake, sitting on the edge of the bed  NAD  I/O       03/05 0701 03/06 0700 03/06 0701 03/07 0700 03/07 0701 03/08 0700    P  O   480 180    I V   907 5     Total Intake  1387 5 180    Urine 400 1050     Total Output 400 1050     Net -400 +337 5 +180           Unmeasured Urine Occurrence  310 x           Invasive Devices  Report    Peripheral Intravenous Line            Peripheral IV 03/04/22 Right Antecubital 2 days                Physical Exam:  Vitals: Blood pressure 146/82, pulse 66, temperature 97 8 °F (36 6 °C), resp  rate 18, SpO2 97 %  ,There is no height or weight on file to calculate BMI  General appearance:  Appears stated age  Head: Normocephalic, without obvious abnormality, atraumatic  Eyes: EOMI, PERRL  Neck: supple, symmetrical, trachea midline   Lungs: non labored breathing  Heart: regular heart rate  Neurologic:   Mental status: GCS 15  Cranial nerves: grossly intact (Cranial nerves II-XII)  Sensory: Decreased sensation RUE and LUE particularly the left 3rd to 5th digits  Intact BLE  Motor: moving all extremities, Strength RUE: SF 4+/5, EF 4/5, EE 4-/5, IO: 4-/5  LUE: SF 4+/5, EF 4+/5, IO 4/5  BLE HF: 4/5, KF 4+/5,DF/PF 4+/5  Reflexes: 2+ and symmetric, right irizarry's and left clonus     Coordination:  no drift in bilateral upper extremities      Lab Results:  Results from last 7 days   Lab Units 03/07/22  0512 03/06/22  1326 03/06/22 0448 03/05/22  0414 03/05/22  0414   WBC Thousand/uL 7 20 4 32 4 29*   < > 4 40   HEMOGLOBIN g/dL 13 4 14 4 14 0   < > 13 2   HEMATOCRIT % 41 1 42 5 40 2   < > 40 9   PLATELETS Thousands/uL 220 206 191   < > 189   NEUTROS PCT % 82*  --  46  --  46   MONOS PCT % 5  --  12  --  13*    < > = values in this interval not displayed  Results from last 7 days   Lab Units 03/07/22  0512 03/06/22  0448 03/05/22  0414 03/04/22  1454 03/04/22  1454   POTASSIUM mmol/L 3 6 3 4* 3 8   < > 4 2   CHLORIDE mmol/L 106 105 102   < > 101   CO2 mmol/L 24 27 29   < > 31   BUN mg/dL 13 12 12   < > 11   CREATININE mg/dL 0 68 0 66 0 76   < > 0 76   CALCIUM mg/dL 9 3 9 1 8 6   < > 9 2   ALK PHOS U/L 62  --   --   --  69   ALT U/L 18  --   --   --  24   AST U/L 16  --   --   --  20    < > = values in this interval not displayed  Results from last 7 days   Lab Units 03/07/22 0512 03/07/22 0327 03/06/22  2015 03/06/22  1326 03/06/22  0448 03/04/22  1454   INR  1 77*  --   --  1 83* 1 96*  --    PTT seconds  --  113* 101* 31  --    < >    < > = values in this interval not displayed  Imaging Studies: I have personally reviewed pertinent reports and I have personally reviewed pertinent films in PACS    EKG, Pathology, and Other Studies: I have personally reviewed pertinent reports  VTE Pharmacologic Prophylaxis: Heparin    VTE Mechanical Prophylaxis: sequential compression device    PLEASE NOTE:  This encounter may have been completed utilizing the [employer] Direct Speech Voice Recognition Software  Grammatical errors, random word insertions, pronoun errors and incomplete sentences are occasional consequences of the system due to software limitations, ambient noise and hardware issues  These may be missed by proof reading prior to affixing electronic signature   Any questions or concerns about the content, text or information contained within the body of this dictation should be directly addressed to the advanced practitioner or physician for clarification

## 2022-03-07 NOTE — TELEPHONE ENCOUNTER
----- Message from Maurice Gaytan MD sent at 3/7/2022  9:44 AM EST -----  Regarding: RE: Office follow-up  He was actually admitted to hospital at [facility] you for the follow-up  The service is aware of him   ----- Message -----  From: Marichuy Paola  Sent: 3/7/2022   8:39 AM EST  To: Mindy Urbano MD, #  Subject: RE: Office follow-up                             Tried calling the patient he does not have a voicemail setup  I will continue to reach out to him   ----- Message -----  From: Maurice Gaytan MD  Sent: 3/5/2022   3:13 PM EST  To: Matt Mendoza, #  Subject: Office follow-up                                 Dr Christiano Locke contacted me regarding this patient over the weekend  It sounds as if he has progressive gait difficulties secondary to stenosis in his cervical spine  We should really get him in to be seen by one of the surgeons this week  This can be a shared visit for solo with any physician to expedite, as I suspect he would be offered surgery  Please contact him 1st thing on Monday morning to schedule an appointment, though he is considering transfer to [facility] [place]      Thank you

## 2022-03-07 NOTE — PROGRESS NOTES
[facility]  Progress Note - Candis Sadler 1950, 71 y o  male MRN: [id number]  Unit/Bed#: MS Mandel8-Yaya Encounter: [id number]  Primary Care Provider: Rogelio Lombardo MD   Date and time admitted to hospital: 3/5/2022  8:18 PM    Ambulatory dysfunction  Assessment & Plan  Secondary to knee osteoarthritis and #1  Management as above  PT OT evaluate -discussed with neurosurgery okay for cautious mobilizing with PT  Defer PT recs and mobility recs to neurosurgery       Mixed hyperlipidemia  Assessment & Plan  Continue statin    Paroxysmal A-fib (HCC)  Assessment & Plan  Continue heart rate control medications  Hold warfarin as per Neurosurgery recommendation  On heparin      WILL (obstructive sleep apnea)  Assessment & Plan  Continue CPAP at night    Essential hypertension  Assessment & Plan  Continue home blood pressure meds with holding parameters  BP is 146/86  Will continue BB   May need additional meds if sustained elevated BPs  Now also on steroids which can bring BP up - but currently okay  * Cervical myelopathy ([address] )  Assessment & Plan  Patient presented with bilateral lower extremity weakness and right-sided neck discomfort right arm numbness tingling x 3 weeks  CTA head and neck, MRI of the brain wwerte unremarkable for acute pathology  CT cervical spine: "Multilevel degenerative changes causing multilevel neural foraminal and spinal canal stenosis as described  Suspect asymmetric soft tissue in the right paraspinal region  Correlate with contrast-enhanced cervical spine MRI recommended to exclude paraspinal soft tissue infection "  MRI cervical spine: "No acute abnormality  Motion limited    Multilevel degenerative spondylosis progressed from 2013 most severe at C5-6 with severe canal stenosis and flattening of the cord  Small focus of T2 signal within the cord at C5-C6 may represent myelomalacia   Moderate canal stenosis at C3-4 mildly indents the cord without signal abnormality  Marked multilevel foraminal stenosis  No prevertebral edema or mass "  According to transferring notes " Discussed with neurosurgery Dr Ross Brown; recommending transfer to [facility] under [facility] with neurosurgery consult for surgical intervention  Recommended holding coumadin for now  Surgical intervention timing TBD  Trend INR daily "  Continue Decadron 10 mg IV daily  Fall precaution    Discussed with neurosurgery - okay to start gtt and to order diet today   Also will defer steroid dosing to neurosurgery                  VTE Pharmacologic Prophylaxis: VTE Score: 3 Moderate Risk (Score 3-4) - Pharmacological DVT Prophylaxis Ordered: heparin  Patient Centered Rounds: I performed bedside rounds with nursing staff today  Discussions with Specialists or Other Care Team Provider: discussed with neurosurgery and let them know that they can freely change steroids as needed  Education and Discussions with Family / Patient: called wife King Jose   Time Spent for Care: 30 minutes  More than 50% of total time spent on counseling and coordination of care as described above  Current Length of Stay: 2 day(s)  Current Patient Status: Inpatient   Certification Statement: The patient will continue to require additional inpatient hospital stay due to IV steroids and surgical intervention   Discharge Plan: as per neurosurgery     Code Status: Level 3 - DNAR and DNI    Subjective:   Patient seen and examined  Feeling "okay"  No new symptoms     Objective:     Vitals:   Temp (24hrs), Av 9 °F (36 6 °C), Min:97 8 °F (36 6 °C), Max:97 9 °F (36 6 °C)    Temp:  [97 8 °F (36 6 °C)-97 9 °F (36 6 °C)] 97 8 °F (36 6 °C)  HR:  [66-88] 66  BP: (146-159)/() 146/82  SpO2:  [93 %-97 %] 97 %  There is no height or weight on file to calculate BMI  Input and Output Summary (last 24 hours):      Intake/Output Summary (Last 24 hours) at 3/7/2022 1344  Last data filed at 3/7/2022 1100  Gross per 24 hour   Intake 840 ml Output 400 ml   Net 440 ml       Physical Exam:   Physical Exam  Constitutional:       General: He is not in acute distress  Appearance: He is not ill-appearing or toxic-appearing  HENT:      Head: Normocephalic  Nose: Nose normal       Mouth/Throat:      Mouth: Mucous membranes are moist    Eyes:      General: No scleral icterus  Right eye: No discharge  Left eye: No discharge  Pupils: Pupils are equal, round, and reactive to light  Cardiovascular:      Rate and Rhythm: Normal rate  Pulmonary:      Effort: Pulmonary effort is normal  No respiratory distress  Breath sounds: No stridor  No wheezing, rhonchi or rales  Chest:      Chest wall: No tenderness  Abdominal:      General: Abdomen is flat  There is no distension  Palpations: There is no mass  Tenderness: There is no abdominal tenderness  There is no left CVA tenderness, guarding or rebound  Hernia: No hernia is present  Musculoskeletal:         General: No swelling, tenderness, deformity or signs of injury  Right lower leg: No edema  Left lower leg: No edema  Skin:     Capillary Refill: Capillary refill takes less than 2 seconds  Coloration: Skin is pale  Skin is not jaundiced  Findings: No bruising, erythema, lesion or rash  Neurological:      Mental Status: He is alert  Cranial Nerves: No cranial nerve deficit  Sensory: No sensory deficit  Motor: No weakness        Gait: Gait normal       Deep Tendon Reflexes: Reflexes normal       Comments: RLE weakness and hand weakness      Psychiatric:         Mood and Affect: Mood normal           Additional Data:     Labs:  Results from last 7 days   Lab Units 03/07/22  0512   WBC Thousand/uL 7 20   HEMOGLOBIN g/dL 13 4   HEMATOCRIT % 41 1   PLATELETS Thousands/uL 220   NEUTROS PCT % 82*   LYMPHS PCT % 13*   MONOS PCT % 5   EOS PCT % 0     Results from last 7 days   Lab Units 03/07/22  0512   SODIUM mmol/L 137 POTASSIUM mmol/L 3 6   CHLORIDE mmol/L 106   CO2 mmol/L 24   BUN mg/dL 13   CREATININE mg/dL 0 68   ANION GAP mmol/L 7   CALCIUM mg/dL 9 3   ALBUMIN g/dL 3 5   TOTAL BILIRUBIN mg/dL 0 63   ALK PHOS U/L 62   ALT U/L 18   AST U/L 16   GLUCOSE RANDOM mg/dL 148*     Results from last 7 days   Lab Units 03/07/22  0512   INR  1 77*     Results from last 7 days   Lab Units 03/04/22  1520   POC GLUCOSE mg/dl 84     Results from last 7 days   Lab Units 03/05/22  0414   HEMOGLOBIN A1C % 6 3*           Lines/Drains:  Invasive Devices  Report    Peripheral Intravenous Line            Peripheral IV 03/04/22 Right Antecubital 2 days                      Imaging: No pertinent imaging reviewed  Recent Cultures (last 7 days):         Last 24 Hours Medication List:   Current Facility-Administered Medications   Medication Dose Route Frequency Provider Last Rate    acetaminophen  650 mg Oral Q4H PRN Neisha Garcia MD      atorvastatin  40 mg Oral QPM Neisha Garcia MD      dexamethasone  4 mg Intravenous Q6H [address] Rojelio Monroy PA-C      DULoxetine  60 mg Oral HS Neisha Garcia MD      flecainide  100 mg Oral BID Neisha Garcia MD      heparin (porcine)  3-30 Units/kg/hr (Order-Specific) Intravenous Titrated Deanna Rdz MD 13 Units/kg/hr (03/07/22 3992)    metoprolol succinate  100 mg Oral Daily Neisha Garcia MD      sodium chloride  75 mL/hr Intravenous Continuous Neisha Garcia MD 75 mL/hr (03/07/22 4038)    tamsulosin  0 4 mg Oral Daily With Arslan Granados MD          Today, Patient Was Seen By: Deanna Rdz MD    **Please Note: This note may have been constructed using a voice recognition system  **

## 2022-03-07 NOTE — UTILIZATION REVIEW
Inpatient Admission Authorization Request   NOTIFICATION OF INPATIENT ADMISSION/INPATIENT AUTHORIZATION REQUEST   SERVICING FACILITY:   [facility]  Address: [address] [place] [address], [address] [place] [id number]  Tax ID: [id number]  NPI: [id number]  Place of Service: Inpatient [address] [facility] Code: 24     ATTENDING PROVIDER:  Attending Name and NPI#: Whitney Blake Md [[id number]]  Address: [address] [place] [address], [address] [place] [id number]  Phone: [phone]     UTILIZATION REVIEW CONTACT:  [facility] Utilization   Network Utilization Review Department  Phone: [phone]  Fax: [phone]  Email: [email or web address]     PHYSICIAN ADVISORY SERVICES:  FOR [facility]EPU-[facility]-SOW[facility] REVIEW - MEDICAL NECESSITY DENIAL  Phone: [phone]  Fax: [phone]  Email: [email or web address]     TYPE OF REQUEST:  Inpatient Status     ADMISSION INFORMATION:  ADMISSION DATE/TIME: 3/5/22  8:18 PM  PATIENT DIAGNOSIS CODE/DESCRIPTION:  Cervical myelopathy ([facility] [address] ) [G95 9]  DISCHARGE DATE/TIME: No discharge date for patient encounter  IMPORTANT INFORMATION:  Please contact the [facility] directly with any questions or concerns regarding this request  Department voicemails are confidential     Send requests for admission clinical reviews, concurrent reviews, approvals, and administrative denials due to lack of clinical to fax [phone]

## 2022-03-08 LAB
ALBUMIN SERPL BCP-MCNC: 3.4 G/DL (ref 3.5–5)
ALP SERPL-CCNC: 57 U/L (ref 46–116)
ALT SERPL W P-5'-P-CCNC: 18 U/L (ref 12–78)
ANION GAP SERPL CALCULATED.3IONS-SCNC: 4 MMOL/L (ref 4–13)
APTT PPP: 49 SECONDS (ref 23–37)
APTT PPP: 57 SECONDS (ref 23–37)
APTT PPP: 69 SECONDS (ref 23–37)
AST SERPL W P-5'-P-CCNC: 16 U/L (ref 5–45)
BASOPHILS # BLD AUTO: 0 THOUSANDS/ΜL (ref 0–0.1)
BASOPHILS NFR BLD AUTO: 0 % (ref 0–1)
BILIRUB SERPL-MCNC: 0.58 MG/DL (ref 0.2–1)
BUN SERPL-MCNC: 13 MG/DL (ref 5–25)
CALCIUM ALBUM COR SERPL-MCNC: 9.9 MG/DL (ref 8.3–10.1)
CALCIUM SERPL-MCNC: 9.4 MG/DL (ref 8.3–10.1)
CHLORIDE SERPL-SCNC: 107 MMOL/L (ref 100–108)
CO2 SERPL-SCNC: 26 MMOL/L (ref 21–32)
CREAT SERPL-MCNC: 0.82 MG/DL (ref 0.6–1.3)
EOSINOPHIL # BLD AUTO: 0 THOUSAND/ΜL (ref 0–0.61)
EOSINOPHIL NFR BLD AUTO: 0 % (ref 0–6)
ERYTHROCYTE [DISTWIDTH] IN BLOOD BY AUTOMATED COUNT: 12.7 % (ref 11.6–15.1)
GFR SERPL CREATININE-BSD FRML MDRD: 88 ML/MIN/1.73SQ M
GLUCOSE SERPL-MCNC: 139 MG/DL (ref 65–140)
GLUCOSE SERPL-MCNC: 156 MG/DL (ref 65–140)
GLUCOSE SERPL-MCNC: 188 MG/DL (ref 65–140)
HCT VFR BLD AUTO: 36.9 % (ref 36.5–49.3)
HGB BLD-MCNC: 13.1 G/DL (ref 12–17)
IMM GRANULOCYTES # BLD AUTO: 0.06 THOUSAND/UL (ref 0–0.2)
IMM GRANULOCYTES NFR BLD AUTO: 1 % (ref 0–2)
LYMPHOCYTES # BLD AUTO: 0.93 THOUSANDS/ΜL (ref 0.6–4.47)
LYMPHOCYTES NFR BLD AUTO: 10 % (ref 14–44)
MCH RBC QN AUTO: 30.7 PG (ref 26.8–34.3)
MCHC RBC AUTO-ENTMCNC: 35.5 G/DL (ref 31.4–37.4)
MCV RBC AUTO: 89 FL (ref 82–98)
MONOCYTES # BLD AUTO: 0.23 THOUSAND/ΜL (ref 0.17–1.22)
MONOCYTES NFR BLD AUTO: 2 % (ref 4–12)
NEUTROPHILS # BLD AUTO: 8.36 THOUSANDS/ΜL (ref 1.85–7.62)
NEUTS SEG NFR BLD AUTO: 87 % (ref 43–75)
NRBC BLD AUTO-RTO: 0 /100 WBCS
PLATELET # BLD AUTO: 215 THOUSANDS/UL (ref 149–390)
PMV BLD AUTO: 9.8 FL (ref 8.9–12.7)
POTASSIUM SERPL-SCNC: 3.8 MMOL/L (ref 3.5–5.3)
PROT SERPL-MCNC: 7.1 G/DL (ref 6.4–8.2)
RBC # BLD AUTO: 4.27 MILLION/UL (ref 3.88–5.62)
SODIUM SERPL-SCNC: 137 MMOL/L (ref 136–145)
WBC # BLD AUTO: 9.58 THOUSAND/UL (ref 4.31–10.16)

## 2022-03-08 PROCEDURE — 85025 COMPLETE CBC W/AUTO DIFF WBC: CPT | Performed by: INTERNAL MEDICINE

## 2022-03-08 PROCEDURE — 80053 COMPREHEN METABOLIC PANEL: CPT | Performed by: INTERNAL MEDICINE

## 2022-03-08 PROCEDURE — 99232 SBSQ HOSP IP/OBS MODERATE 35: CPT | Performed by: INTERNAL MEDICINE

## 2022-03-08 PROCEDURE — 82948 REAGENT STRIP/BLOOD GLUCOSE: CPT

## 2022-03-08 PROCEDURE — 85730 THROMBOPLASTIN TIME PARTIAL: CPT | Performed by: INTERNAL MEDICINE

## 2022-03-08 RX ORDER — PANTOPRAZOLE SODIUM 40 MG/1
40 TABLET, DELAYED RELEASE ORAL
Status: DISCONTINUED | OUTPATIENT
Start: 2022-03-09 | End: 2022-03-18 | Stop reason: HOSPADM

## 2022-03-08 RX ADMIN — DULOXETINE HYDROCHLORIDE 60 MG: 60 CAPSULE, DELAYED RELEASE ORAL at 21:15

## 2022-03-08 RX ADMIN — HEPARIN SODIUM 16 UNITS/KG/HR: 10000 INJECTION, SOLUTION INTRAVENOUS; SUBCUTANEOUS at 21:26

## 2022-03-08 RX ADMIN — FLECAINIDE ACETATE 100 MG: 100 TABLET ORAL at 18:08

## 2022-03-08 RX ADMIN — DEXAMETHASONE SODIUM PHOSPHATE 4 MG: 4 INJECTION INTRA-ARTICULAR; INTRALESIONAL; INTRAMUSCULAR; INTRAVENOUS; SOFT TISSUE at 00:06

## 2022-03-08 RX ADMIN — INSULIN LISPRO 2 UNITS: 100 INJECTION, SOLUTION INTRAVENOUS; SUBCUTANEOUS at 18:21

## 2022-03-08 RX ADMIN — DEXAMETHASONE SODIUM PHOSPHATE 4 MG: 4 INJECTION INTRA-ARTICULAR; INTRALESIONAL; INTRAMUSCULAR; INTRAVENOUS; SOFT TISSUE at 11:19

## 2022-03-08 RX ADMIN — DEXAMETHASONE SODIUM PHOSPHATE 4 MG: 4 INJECTION INTRA-ARTICULAR; INTRALESIONAL; INTRAMUSCULAR; INTRAVENOUS; SOFT TISSUE at 18:08

## 2022-03-08 RX ADMIN — METOPROLOL SUCCINATE 100 MG: 100 TABLET, EXTENDED RELEASE ORAL at 09:55

## 2022-03-08 RX ADMIN — DEXAMETHASONE SODIUM PHOSPHATE 4 MG: 4 INJECTION INTRA-ARTICULAR; INTRALESIONAL; INTRAMUSCULAR; INTRAVENOUS; SOFT TISSUE at 05:27

## 2022-03-08 RX ADMIN — ATORVASTATIN CALCIUM 40 MG: 40 TABLET, FILM COATED ORAL at 18:08

## 2022-03-08 RX ADMIN — HEPARIN SODIUM 12 UNITS/KG/HR: 10000 INJECTION, SOLUTION INTRAVENOUS; SUBCUTANEOUS at 01:48

## 2022-03-08 RX ADMIN — SODIUM CHLORIDE 75 ML/HR: 9 INJECTION, SOLUTION INTRAVENOUS at 11:30

## 2022-03-08 RX ADMIN — FLECAINIDE ACETATE 100 MG: 100 TABLET ORAL at 09:55

## 2022-03-08 NOTE — PLAN OF CARE
Problem: MOBILITY - ADULT  Goal: Maintain or return to baseline ADL function  Description: INTERVENTIONS:  -  Assess patient's ability to carry out ADLs; assess patient's baseline for ADL function and identify physical deficits which impact ability to perform ADLs (bathing, care of mouth/teeth, toileting, grooming, dressing, etc )  - Assess/evaluate cause of self-care deficits   - Assess range of motion  - Assess patient's mobility; develop plan if impaired  - Assess patient's need for assistive devices and provide as appropriate  - Encourage maximum independence but intervene and supervise when necessary  - Involve family in performance of ADLs  - Assess for home care needs following discharge   - Consider OT consult to assist with ADL evaluation and planning for discharge  - Provide patient education as appropriate  Outcome: Progressing  Goal: Maintains/Returns to pre admission functional level  Description: INTERVENTIONS:  - Perform BMAT or MOVE assessment daily    - Set and communicate daily mobility goal to care team and patient/family/caregiver     - Collaborate with rehabilitation services on mobility goals if consulted  - Out of bed for toileting  - Record patient progress and toleration of activity level   Outcome: Progressing     Problem: PAIN - ADULT  Goal: Verbalizes/displays adequate comfort level or baseline comfort level  Description: Interventions:  - Encourage patient to monitor pain and request assistance  - Assess pain using appropriate pain scale  - Administer analgesics based on type and severity of pain and evaluate response  - Implement non-pharmacological measures as appropriate and evaluate response  - Consider cultural and social influences on pain and pain management  - Notify physician/advanced practitioner if interventions unsuccessful or patient reports new pain  Outcome: Progressing     Problem: INFECTION - ADULT  Goal: Absence or prevention of progression during hospitalization  Description: INTERVENTIONS:  - Assess and monitor for signs and symptoms of infection  - Monitor lab/diagnostic results  - Monitor all insertion sites, i e  indwelling lines, tubes, and drains  - Monitor endotracheal if appropriate and nasal secretions for changes in amount and color  - [place] appropriate cooling/warming therapies per order  - Administer medications as ordered  - Instruct and encourage patient and family to use good hand hygiene technique  - Identify and instruct in appropriate isolation precautions for identified infection/condition  Outcome: Progressing  Goal: Absence of fever/infection during neutropenic period  Description: INTERVENTIONS:  - Monitor WBC    Outcome: Progressing     Problem: SAFETY ADULT  Goal: Maintain or return to baseline ADL function  Description: INTERVENTIONS:  -  Assess patient's ability to carry out ADLs; assess patient's baseline for ADL function and identify physical deficits which impact ability to perform ADLs (bathing, care of mouth/teeth, toileting, grooming, dressing, etc )  - Assess/evaluate cause of self-care deficits   - Assess range of motion  - Assess patient's mobility; develop plan if impaired  - Assess patient's need for assistive devices and provide as appropriate  - Encourage maximum independence but intervene and supervise when necessary  - Involve family in performance of ADLs  - Assess for home care needs following discharge   - Consider OT consult to assist with ADL evaluation and planning for discharge  - Provide patient education as appropriate  Outcome: Progressing  Goal: Maintains/Returns to pre admission functional level  Description: INTERVENTIONS:  - Perform BMAT or MOVE assessment daily    - Set and communicate daily mobility goal to care team and patient/family/caregiver     - Collaborate with rehabilitation services on mobility goals if consulted  - Out of bed for toileting  - Record patient progress and toleration of activity level   Outcome: Progressing  Goal: Patient will remain free of falls  Description: INTERVENTIONS:  - Educate patient/family on patient safety including physical limitations  - Instruct patient to call for assistance with activity   - Consult OT/PT to assist with strengthening/mobility   - Keep Call bell within reach  - Keep bed low and locked with side rails adjusted as appropriate  - Keep care items and personal belongings within reach  - Initiate and maintain comfort rounds  - Make Fall Risk Sign visible to staff  - Offer Toileting every 2 Hours, in advance of need  - Apply yellow socks and bracelet for high fall risk patients  - Consider moving patient to room near nurses station  Outcome: Progressing     Problem: DISCHARGE PLANNING  Goal: Discharge to home or other facility with appropriate resources  Description: INTERVENTIONS:  - Identify barriers to discharge w/patient and caregiver  - Arrange for needed discharge resources and transportation as appropriate  - Identify discharge learning needs (meds, wound care, etc )  - Arrange for interpretive services to assist at discharge as needed  - Refer to Case Management Department for coordinating discharge planning if the patient needs post-hospital services based on physician/advanced practitioner order or complex needs related to functional status, cognitive ability, or social support system  Outcome: Progressing     Problem: Knowledge Deficit  Goal: Patient/family/caregiver demonstrates understanding of disease process, treatment plan, medications, and discharge instructions  Description: Complete learning assessment and assess knowledge base    Interventions:  - Provide teaching at level of understanding  - Provide teaching via preferred learning methods  Outcome: Progressing     Problem: Neurological Deficit  Goal: Neurological status is stable or improving  Description: Interventions:  - Monitor and assess patient's level of consciousness, motor function, sensory function, and level of assistance needed for ADLs  - Monitor and report changes from baseline  Collaborate with interdisciplinary team to initiate plan and implement interventions as ordered  - Provide and maintain a safe environment  - Consider seizure precautions  - Consider fall precautions  - Consider aspiration precautions  - Consider bleeding precautions  Outcome: Progressing     Problem: Activity Intolerance/Impaired Mobility  Goal: Mobility/activity is maintained at optimum level for patient  Description: Interventions:  - Assess and monitor patient  barriers to mobility and need for assistive/adaptive devices  - Assess patient's emotional response to limitations  - Collaborate with interdisciplinary team and initiate plans and interventions as ordered  - Encourage independent activity per ability   - Maintain proper body alignment  - Perform active/passive rom as tolerated/ordered  - Plan activities to conserve energy   - Turn patient as appropriate  Outcome: Progressing     Problem: Communication Impairment  Goal: Ability to express needs and understand communication  Description: Assess patient's communication skills and ability to understand information  Patient will demonstrate use of effective communication techniques, alternative methods of communication and understanding even if not able to speak  - Encourage communication and provide alternate methods of communication as needed  - Collaborate with case management/ for discharge needs  - Include patient/family/caregiver in decisions related to communication  Outcome: Progressing     Problem: Potential for Aspiration  Goal: Non-ventilated patient's risk of aspiration is minimized  Description: Assess and monitor vital signs, respiratory status, and labs (WBC)  Monitor for signs of aspiration (tachypnea, cough, rales, wheezing, cyanosis, fever)      - Assess and monitor patient's ability to swallow  - Place patient up in chair to eat if possible  - HOB up at 90 degrees to eat if unable to get patient up into chair   - Supervise patient during oral intake  - Instruct patient/ family to take small bites  - Instruct patient/ family to take small single sips when taking liquids  - Follow patient-specific strategies generated by speech pathologist   Outcome: Progressing  Goal: Ventilated patient's risk of aspiration is minimized  Description: Assess and monitor vital signs, respiratory status, airway cuff pressure, and labs (WBC)  Monitor for signs of aspiration (tachypnea, cough, rales, wheezing, cyanosis, fever)  - Elevate head of bed 30 degrees if patient has tube feeding   - Monitor tube feeding  Outcome: Progressing     Problem: Nutrition  Goal: Nutrition/Hydration status is improving  Description: Monitor and assess patient's nutrition/hydration status for malnutrition (ex- brittle hair, bruises, dry skin, pale skin and conjunctiva, muscle wasting, smooth red tongue, and disorientation)  Collaborate with interdisciplinary team and initiate plan and interventions as ordered  Monitor patient's weight and dietary intake as ordered or per policy  Utilize nutrition screening tool and intervene per policy  Determine patient's food preferences and provide high-protein, high-caloric foods as appropriate  - Assist patient with eating   - Allow adequate time for meals   - Encourage patient to take dietary supplement as ordered  - Collaborate with clinical nutritionist   - Include patient/family/caregiver in decisions related to nutrition  Outcome: Progressing     Problem: Nutrition/Hydration-ADULT  Goal: Nutrient/Hydration intake appropriate for improving, restoring or maintaining nutritional needs  Description: Monitor and assess patient's nutrition/hydration status for malnutrition  Collaborate with interdisciplinary team and initiate plan and interventions as ordered    Monitor patient's weight and dietary intake as ordered or per policy  Utilize nutrition screening tool and intervene as necessary  Determine patient's food preferences and provide high-protein, high-caloric foods as appropriate       INTERVENTIONS:  - Monitor oral intake, urinary output, labs, and treatment plans  - Assess nutrition and hydration status and recommend course of action  - Evaluate amount of meals eaten  - Assist patient with eating if necessary   - Allow adequate time for meals  - Recommend/ encourage appropriate diets, oral nutritional supplements, and vitamin/mineral supplements  - Order, calculate, and assess calorie counts as needed  - Recommend, monitor, and adjust tube feedings and TPN/PPN based on assessed needs  - Assess need for intravenous fluids  - Provide specific nutrition/hydration education as appropriate  - Include patient/family/caregiver in decisions related to nutrition  Outcome: Progressing     Problem: Potential for Falls  Goal: Patient will remain free of falls  Description: INTERVENTIONS:  - Educate patient/family on patient safety including physical limitations  - Instruct patient to call for assistance with activity   - Consult OT/PT to assist with strengthening/mobility   - Keep Call bell within reach  - Keep bed low and locked with side rails adjusted as appropriate  - Keep care items and personal belongings within reach  - Initiate and maintain comfort rounds  - Make Fall Risk Sign visible to staff  - Offer Toileting every 2  Hours, in advance of need  - Apply yellow socks and bracelet for high fall risk patients  - Consider moving patient to room near nurses station  Outcome: Progressing

## 2022-03-08 NOTE — ASSESSMENT & PLAN NOTE
Patient presented with bilateral lower extremity weakness and right-sided neck discomfort right arm numbness tingling x 3 weeks  According to transferring notes   " Discussed with neurosurgery Dr Sam Foote; recommending transfer to [facility] under [facility] with neurosurgery consult for surgical intervention  Recommended holding coumadin for now  Surgical intervention timing TBD   Trend INR daily "  heparin gtt   IV steroids per neurosurgery  Fall precaution

## 2022-03-08 NOTE — RESTORATIVE TECHNICIAN NOTE
Restorative Technician Note      Patient Name: Verna Cantrell     Note Type: Mobility  Patient Position Upon Consult: Supine  Activity Performed: Ambulated; [id number]; Stood  Assistive Device: Roller walker  Education Provided: Yes  Patient Position at End of Consult: Supine;  All needs within reach; Bed/Chair alarm activated      Eufemia VILLANUEVA, Restorative Technician, [employer]

## 2022-03-08 NOTE — ASSESSMENT & PLAN NOTE
Continue heart rate control medications  Hold warfarin as per Neurosurgery recommendation  On heparin gtt

## 2022-03-08 NOTE — ASSESSMENT & PLAN NOTE
Management as above  PT OT evaluate -discussed with neurosurgery okay for cautious mobilizing with PT     Defer PT recs and mobility recs to neurosurgery

## 2022-03-08 NOTE — PROGRESS NOTES
[address] [facility]  Progress Note - Kev Bring 1950, 71 y o  male MRN: [id number]  Unit/Bed#: -01 Encounter: [id number]  Primary Care Provider: Chrystal Frankel, MD   Date and time admitted to hospital: 3/5/2022  8:18 PM    * Cervical myelopathy [facility])  Assessment & Plan  Patient presented with bilateral lower extremity weakness and right-sided neck discomfort right arm numbness tingling x 3 weeks  According to transferring notes   " Discussed with neurosurgery Dr Ashish Clay; recommending transfer to [facility] under [facility] with neurosurgery consult for surgical intervention  Recommended holding coumadin for now  Surgical intervention timing TBD  Trend INR daily "  heparin gtt   IV steroids per neurosurgery  Fall precaution            Ambulatory dysfunction  Assessment & Plan  Management as above  PT OT evaluate -discussed with neurosurgery okay for cautious mobilizing with PT  Defer PT recs and mobility recs to neurosurgery       Mixed hyperlipidemia  Assessment & Plan  Continue statin    Paroxysmal A-fib (HCC)  Assessment & Plan  Continue heart rate control medications  Hold warfarin as per Neurosurgery recommendation  On heparin gtt      WILL (obstructive sleep apnea)  Assessment & Plan  Continue CPAP at night  Essential hypertension  Assessment & Plan  Continue home blood pressure meds with holding parameters          VTE Pharmacologic Prophylaxis: VTE Score: 3 Moderate Risk (Score 3-4) - Pharmacological DVT Prophylaxis Ordered: heparin  Patient Centered Rounds: I performed bedside rounds with nursing staff today  Discussions with Specialists or Other Care Team Provider: discussed with neurosurgery and let them know that they can freely change steroids as needed  Education and Discussions with Family / Patient: called wife Sixto Bello   Time Spent for Care: 30 minutes  More than 50% of total time spent on counseling and coordination of care as described above      Current Length of Stay: 3 day(s)  Current Patient Status: Inpatient   Certification Statement: The patient will continue to require additional inpatient hospital stay due to IV steroids and surgical intervention   Discharge Plan: as per neurosurgery     Code Status: Level 3 - DNAR and DNI    Subjective:   Seen for follow up for right sided weakness  Complaining of decrease sensation on his left lateral thigh today  Discontinuing flomax, states not making it to the bathroom in time  No complaints about BMs  Objective:     Vitals:   Temp (24hrs), Av 9 °F (36 6 °C), Min:97 9 °F (36 6 °C), Max:97 9 °F (36 6 °C)    Temp:  [97 9 °F (36 6 °C)] 97 9 °F (36 6 °C)  HR:  [54-62] 62  BP: (152-154)/(77-79) 152/77  SpO2:  [92 %-96 %] 96 %  There is no height or weight on file to calculate BMI  Input and Output Summary (last 24 hours): Intake/Output Summary (Last 24 hours) at 3/8/2022 1331  Last data filed at 3/8/2022 1323  Gross per 24 hour   Intake 4868 75 ml   Output 2700 ml   Net 2168 75 ml       Physical Exam:   Physical Exam  Constitutional:       General: He is not in acute distress  Appearance: He is not ill-appearing or toxic-appearing  HENT:      Head: Normocephalic  Nose: Nose normal       Mouth/Throat:      Mouth: Mucous membranes are moist    Eyes:      General: No scleral icterus  Right eye: No discharge  Left eye: No discharge  Pupils: Pupils are equal, round, and reactive to light  Cardiovascular:      Rate and Rhythm: Normal rate  Pulmonary:      Effort: Pulmonary effort is normal  No respiratory distress  Breath sounds: No stridor  No wheezing, rhonchi or rales  Chest:      Chest wall: No tenderness  Abdominal:      General: Abdomen is flat  There is no distension  Palpations: There is no mass  Tenderness: There is no abdominal tenderness  There is no left CVA tenderness, guarding or rebound  Hernia: No hernia is present     Musculoskeletal: General: No swelling, tenderness, deformity or signs of injury  Right lower leg: No edema  Left lower leg: No edema  Skin:     Capillary Refill: Capillary refill takes less than 2 seconds  Coloration: Skin is pale  Skin is not jaundiced  Findings: No bruising, erythema, lesion or rash  Neurological:      Mental Status: He is alert  Cranial Nerves: No cranial nerve deficit  Sensory: No sensory deficit  Motor: No weakness  Gait: Gait normal       Deep Tendon Reflexes: Reflexes normal       Comments: RLE weakness and hand weakness      Psychiatric:         Mood and Affect: Mood normal           Additional Data:     Labs:  Results from last 7 days   Lab Units 03/08/22  0535   WBC Thousand/uL 9 58   HEMOGLOBIN g/dL 13 1   HEMATOCRIT % 36 9   PLATELETS Thousands/uL 215   NEUTROS PCT % 87*   LYMPHS PCT % 10*   MONOS PCT % 2*   EOS PCT % 0     Results from last 7 days   Lab Units 03/08/22  0535   SODIUM mmol/L 137   POTASSIUM mmol/L 3 8   CHLORIDE mmol/L 107   CO2 mmol/L 26   BUN mg/dL 13   CREATININE mg/dL 0 82   ANION GAP mmol/L 4   CALCIUM mg/dL 9 4   ALBUMIN g/dL 3 4*   TOTAL BILIRUBIN mg/dL 0 58   ALK PHOS U/L 57   ALT U/L 18   AST U/L 16   GLUCOSE RANDOM mg/dL 156*     Results from last 7 days   Lab Units 03/07/22  0512   INR  1 77*     Results from last 7 days   Lab Units 03/04/22  1520   POC GLUCOSE mg/dl 84     Results from last 7 days   Lab Units 03/05/22  0414   HEMOGLOBIN A1C % 6 3*           Lines/Drains:  Invasive Devices  Report    Peripheral Intravenous Line            Peripheral IV 03/08/22 Left Forearm <1 day                      Imaging: No pertinent imaging reviewed      Recent Cultures (last 7 days):         Last 24 Hours Medication List:   Current Facility-Administered Medications   Medication Dose Route Frequency Provider Last Rate    acetaminophen  650 mg Oral Q4H PRN Ludwin Rodriguez MD      atorvastatin  40 mg Oral QPM MD Simeon Vallejo dexamethasone  4 mg Intravenous Q6H [address] Cat Puentes PA-C      DULoxetine  60 mg Oral HS Mckenna Yancey MD      flecainide  100 mg Oral BID Mckenna Yancey MD      heparin (porcine)  3-30 Units/kg/hr (Order-Specific) Intravenous Titrated Evelyn Duran MD 14 Units/kg/hr (03/08/22 0800)   [employer] insulin lispro  1-5 Units Subcutaneous HS Adele Spain MD      insulin lispro  2-12 Units Subcutaneous 4 times day Adele Spain MD      metoprolol succinate  100 mg Oral Daily Mckenna Yancey MD     [employer] Syliva Linea ON 3/9/2022] pantoprazole  40 mg Oral Early Morning Adele Spain MD      sodium chloride  75 mL/hr Intravenous Continuous Mckenna Yancey MD 75 mL/hr (03/08/22 1130)        Today, Patient Was Seen By: Adele Spain MD    **Please Note: This note may have been constructed using a voice recognition system  **

## 2022-03-09 LAB
APTT PPP: 76 SECONDS (ref 23–37)
GLUCOSE SERPL-MCNC: 128 MG/DL (ref 65–140)
GLUCOSE SERPL-MCNC: 143 MG/DL (ref 65–140)
GLUCOSE SERPL-MCNC: 154 MG/DL (ref 65–140)
INR PPP: 1.25 (ref 0.84–1.19)
PROTHROMBIN TIME: 15.2 SECONDS (ref 11.6–14.5)

## 2022-03-09 PROCEDURE — 85730 THROMBOPLASTIN TIME PARTIAL: CPT | Performed by: STUDENT IN AN ORGANIZED HEALTH CARE EDUCATION/TRAINING PROGRAM

## 2022-03-09 PROCEDURE — 82948 REAGENT STRIP/BLOOD GLUCOSE: CPT

## 2022-03-09 PROCEDURE — 99232 SBSQ HOSP IP/OBS MODERATE 35: CPT | Performed by: INTERNAL MEDICINE

## 2022-03-09 PROCEDURE — 99223 1ST HOSP IP/OBS HIGH 75: CPT | Performed by: INTERNAL MEDICINE

## 2022-03-09 PROCEDURE — 99232 SBSQ HOSP IP/OBS MODERATE 35: CPT | Performed by: NEUROLOGICAL SURGERY

## 2022-03-09 PROCEDURE — 85610 PROTHROMBIN TIME: CPT | Performed by: PHYSICIAN ASSISTANT

## 2022-03-09 RX ADMIN — FLECAINIDE ACETATE 100 MG: 100 TABLET ORAL at 17:32

## 2022-03-09 RX ADMIN — DULOXETINE HYDROCHLORIDE 60 MG: 60 CAPSULE, DELAYED RELEASE ORAL at 22:29

## 2022-03-09 RX ADMIN — HEPARIN SODIUM 16 UNITS/KG/HR: 10000 INJECTION, SOLUTION INTRAVENOUS; SUBCUTANEOUS at 12:27

## 2022-03-09 RX ADMIN — ATORVASTATIN CALCIUM 40 MG: 40 TABLET, FILM COATED ORAL at 17:32

## 2022-03-09 RX ADMIN — DEXAMETHASONE SODIUM PHOSPHATE 4 MG: 4 INJECTION INTRA-ARTICULAR; INTRALESIONAL; INTRAMUSCULAR; INTRAVENOUS; SOFT TISSUE at 17:32

## 2022-03-09 RX ADMIN — DEXAMETHASONE SODIUM PHOSPHATE 4 MG: 4 INJECTION INTRA-ARTICULAR; INTRALESIONAL; INTRAMUSCULAR; INTRAVENOUS; SOFT TISSUE at 12:31

## 2022-03-09 RX ADMIN — SODIUM CHLORIDE 75 ML/HR: 9 INJECTION, SOLUTION INTRAVENOUS at 12:52

## 2022-03-09 RX ADMIN — FLECAINIDE ACETATE 100 MG: 100 TABLET ORAL at 09:33

## 2022-03-09 RX ADMIN — METOPROLOL SUCCINATE 100 MG: 100 TABLET, EXTENDED RELEASE ORAL at 09:33

## 2022-03-09 RX ADMIN — PANTOPRAZOLE SODIUM 40 MG: 40 TABLET, DELAYED RELEASE ORAL at 06:18

## 2022-03-09 RX ADMIN — SODIUM CHLORIDE 75 ML/HR: 9 INJECTION, SOLUTION INTRAVENOUS at 00:17

## 2022-03-09 RX ADMIN — DEXAMETHASONE SODIUM PHOSPHATE 4 MG: 4 INJECTION INTRA-ARTICULAR; INTRALESIONAL; INTRAMUSCULAR; INTRAVENOUS; SOFT TISSUE at 00:13

## 2022-03-09 RX ADMIN — INSULIN LISPRO 2 UNITS: 100 INJECTION, SOLUTION INTRAVENOUS; SUBCUTANEOUS at 09:34

## 2022-03-09 RX ADMIN — DEXAMETHASONE SODIUM PHOSPHATE 4 MG: 4 INJECTION INTRA-ARTICULAR; INTRALESIONAL; INTRAMUSCULAR; INTRAVENOUS; SOFT TISSUE at 06:17

## 2022-03-09 RX ADMIN — DEXAMETHASONE SODIUM PHOSPHATE 4 MG: 4 INJECTION INTRA-ARTICULAR; INTRALESIONAL; INTRAMUSCULAR; INTRAVENOUS; SOFT TISSUE at 23:56

## 2022-03-09 NOTE — ASSESSMENT & PLAN NOTE
Severe canal stenosis C5-6 with small area of signal abnormality   Presented with significant decline in ambulation along with numbness and weakness R>L over the last week  Reports multiple falls  Imaging:    MRI cervical spine with without 3/5/2022: Motion limited  Multilevel degenerative spondylosis progressed from 2013 with most severe canal stenosis noted at C5-6 with flattening of the cord and small focus of T2 signal abnormality likely representing myelomalacia  Moderate canal stenosis at C3-4 which mildly indents the cord without signal abnormality  Plan:   Continue monitor neurological exam and symptoms   Discussed etiology of patient's symptoms correlating with cervical stenosis   Patient on a trial of steroids  Currently on Decadron 4mg q 6hr  Pt reports noticing some improvement with steroids   Discussed need for surgical intervention Cervical decompressive surgery  Procedure along with risks and benefits discussed   Patient will need a medical optimization and medical clearance  Appreciate input of cardiology for surgical clearance   Given 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, prior anticoagulation (Coumadin) is currently ion hold  Deferred reversing secondary to clotting disorder  3/9/22  INR at 1 25  Pt is on Heparin gtt   Timing for surgical intervention TBD - possibly this Friday 3/9/22 by Dr Lorraine Vega Neurosurgery will continue to follow  Call with any questions/concerns

## 2022-03-09 NOTE — PROGRESS NOTES
[address] [facility]  Progress Note - Nilesh Taveras 1950, 71 y o  male MRN: [id number]  Unit/Bed#: -Yaya Encounter: [id number]  Primary Care Provider: Carlos Calrton MD   Date and time admitted to hospital: 3/5/2022  8:18 PM    * Cervical myelopathy [facility])  Assessment & Plan  Severe canal stenosis C5-6 with small area of signal abnormality   Presented with significant decline in ambulation along with numbness and weakness R>L over the last week  Reports multiple falls  Imaging:    MRI cervical spine with without 3/5/2022: Motion limited  Multilevel degenerative spondylosis progressed from 2013 with most severe canal stenosis noted at C5-6 with flattening of the cord and small focus of T2 signal abnormality likely representing myelomalacia  Moderate canal stenosis at C3-4 which mildly indents the cord without signal abnormality  Plan:   Continue monitor neurological exam and symptoms   Discussed etiology of patient's symptoms correlating with cervical stenosis   Patient on a trial of steroids  Currently on Decadron 4mg q 6hr  Pt reports noticing some improvement with steroids   Discussed with patient the need for surgical intervention for Cervical decompressive surgery  Procedure, along with risks and benefits discussed  Pt is amenable to surgical intervention   Appreciate the input of cardiology and medicine for surgical clearance  Pt cleared by Cardiology and Medicine for surgery   Given 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, prior anticoagulation (Coumadin) is currently ion hold  Deferred reversing secondary to clotting disorder  3/9/22  INR at 1 25  Pt is on Heparin gtt   Timing for surgical intervention TBD - possibly this Friday 3/11/22 by Dr Jefferson Spann Neurosurgery will continue to follow  Call with any questions/concerns          Subjective/Objective     Chief Complaint: "My hands feel a little better"    Subjective: Pt reports that while on Decadron/steroids, his right hand particularly feels a little better  Pt reports he is able to make a fist and move the hands better than he was prior to admission  He also reports his knees which are due for surgery as well feel better on the steroids  Pt reports that he still has significant gait instability but reports that he was able to get up by his bedside using the walker  Pt reports he continues to have mild numbness and weakness in BUE right > left  Pt denies bowel or bladder incontinence  Objective: Alert and awake, laying in bed, NAD  I/O       03/07 0701 03/08 0700 03/08 0701 03/09 0700 03/09 0701  03/10 0700    P  O  1020 730     I V   3728 8     Total Intake 1020 4458 8     Urine 1525 2650 200    Stool  0     Total Output 1525 2650 200    Net -505 +1808 8 -200           Unmeasured Urine Occurrence  1 x     Unmeasured Stool Occurrence  1 x           Invasive Devices  Report    Peripheral Intravenous Line            Peripheral IV 03/08/22 Left Forearm 1 day                Physical Exam:  Vitals: Blood pressure 141/82, pulse 56, temperature 98 °F (36 7 °C), resp  rate 18, SpO2 96 %  ,There is no height or weight on file to calculate BMI  General appearance:  Appears stated age  Head: Normocephalic, without obvious abnormality, atraumatic  Eyes: EOMI, PERRL  Neck: supple, symmetrical, trachea midline   Lungs: non labored breathing  Heart: regular heart rate  Neurologic:   Mental status: Alert, oriented, thought content appropriate  Cranial nerves: grossly intact (Cranial nerves II-XII)  Sensory: normal to LT X 4  Motor: moving all extremities, strength RUE 4+-5/5, LUE 5/5  BLE HF: 4+/5, KE/KF 4+/5, DF/PF 5/5  Reflexes: 2+ and symmetric, Right irizarry's, bilateral clonus    Coordination: no drift in bilateral upper extremities      Lab Results:  Results from last 7 days   Lab Units 03/08/22  0535 03/07/22  0512 03/06/22  1326 03/06/22  0448 03/06/22  0448   WBC Thousand/uL 9 58 7 20 4 32   < > 4 29*   HEMOGLOBIN g/dL 13 1 13 4 14 4   < > 14 0   HEMATOCRIT % 36 9 41 1 42 5   < > 40 2   PLATELETS Thousands/uL 215 220 206   < > 191   NEUTROS PCT % 87* 82*  --   --  46   MONOS PCT % 2* 5  --   --  12    < > = values in this interval not displayed  Results from last 7 days   Lab Units 03/08/22  0535 03/07/22  0512 03/06/22  0448 03/05/22  0414 03/04/22  1454   POTASSIUM mmol/L 3 8 3 6 3 4*   < > 4 2   CHLORIDE mmol/L 107 106 105   < > 101   CO2 mmol/L 26 24 27   < > 31   BUN mg/dL 13 13 12   < > 11   CREATININE mg/dL 0 82 0 68 0 66   < > 0 76   CALCIUM mg/dL 9 4 9 3 9 1   < > 9 2   ALK PHOS U/L 57 62  --   --  69   ALT U/L 18 18  --   --  24   AST U/L 16 16  --   --  20    < > = values in this interval not displayed  Results from last 7 days   Lab Units 03/09/22  0438 03/09/22  0425 03/08/22  2158 03/08/22  1447 03/07/22  1149 03/07/22  0512 03/06/22  2015 03/06/22  1326   INR  1 25*  --   --   --   --  1 77*  --  1 83*   PTT seconds  --  76* 69* 57*   < >  --    < > 31    < > = values in this interval not displayed  Imaging Studies: I have personally reviewed pertinent reports and I have personally reviewed pertinent films in PACS    EKG, Pathology, and Other Studies: I have personally reviewed pertinent reports  VTE Pharmacologic Prophylaxis: Heparin    VTE Mechanical Prophylaxis: sequential compression device    PLEASE NOTE:  This encounter may have been completed utilizing the [employer] Direct Speech Voice Recognition Software  Grammatical errors, random word insertions, pronoun errors and incomplete sentences are occasional consequences of the system due to software limitations, ambient noise and hardware issues  These may be missed by proof reading prior to affixing electronic signature   Any questions or concerns about the content, text or information contained within the body of this dictation should be directly addressed to the advanced practitioner or physician for clarification

## 2022-03-09 NOTE — ASSESSMENT & PLAN NOTE
· S/p Anterior cervical discectomy and fixation fusion C5/6 and C6/7; Posterior cervical decompression and instrumented fusion C3-T1, 3/11/2022  Plan  · Neurosurgery following  · On Decadron taper  · PT/OT

## 2022-03-09 NOTE — PROGRESS NOTES
[facility] [facility]  Progress Note - Leeta Flight 1950, 71 y o  male MRN: [id number]  Unit/Bed#: -Yaya Encounter: [id number]  Primary Care Provider: Marco Antonio Moreno MD   Date and time admitted to hospital: 3/5/2022  8:18 PM    * Cervical myelopathy ([facility] [address] )  Assessment & Plan  · Patient presented with bilateral lower extremity weakness and right-sided neck discomfort right arm numbness tingling x 3 weeks  · According to transferring notes   · " Discussed with neurosurgery Dr Eulogio Deleon; recommending transfer to [facility] under [facility] with neurosurgery consult for surgical intervention  Recommended holding coumadin for now  Surgical intervention timing TBD  Trend INR daily "  · heparin gtt  · Goal INR per neurosugery, <1 4  · IV steroids per neurosurgery  · Pending cardiac clearance  · Acceptable risk from medical standpoint to proceed to OR for anterior cervical diskectomy             Paroxysmal A-fib (HCC)  Assessment & Plan  · Continue heart rate control medications  · Hold warfarin as per Neurosurgery recommendation  · On heparin gtt  · Daily INR      Ambulatory dysfunction  Assessment & Plan  · Management as above  · PT OT evaluate -discussed with neurosurgery okay for cautious mobilizing with PT    · Defer PT recs and mobility recs to neurosurgery       Mixed hyperlipidemia  Assessment & Plan  · Continue statin    WILL (obstructive sleep apnea)  Assessment & Plan  · Continue CPAP at night  Essential hypertension  Assessment & Plan  · Continue home blood pressure meds with holding parameters          VTE Pharmacologic Prophylaxis: VTE Score: 3 Moderate Risk (Score 3-4) - Pharmacological DVT Prophylaxis Ordered: heparin  Patient Centered Rounds: I performed bedside rounds with nursing staff today  Discussions with Specialists or Other Care Team Provider: discussed with neurosurgery and let them know that they can freely change steroids as needed       Education and Discussions with Family / Patient: called wife King Jose   Time Spent for Care: 30 minutes  More than 50% of total time spent on counseling and coordination of care as described above  Current Length of Stay: 4 day(s)  Current Patient Status: Inpatient   Certification Statement: The patient will continue to require additional inpatient hospital stay due to IV steroids and surgical intervention   Discharge Plan: as per neurosurgery     Code Status: Level 3 - DNAR and DNI    Subjective:   Seen for follow up for right sided weakness  INR at goal  Pending cardiac clearance    Objective:     Vitals:   Temp (24hrs), Av 8 °F (36 6 °C), Min:97 6 °F (36 4 °C), Max:98 °F (36 7 °C)    Temp:  [97 6 °F (36 4 °C)-98 °F (36 7 °C)] 98 °F (36 7 °C)  HR:  [52-62] 56  BP: (141-169)/(73-82) 141/82  SpO2:  [94 %-96 %] 96 %  There is no height or weight on file to calculate BMI  Input and Output Summary (last 24 hours): Intake/Output Summary (Last 24 hours) at 3/9/2022 1106  Last data filed at 3/9/2022 0944  Gross per 24 hour   Intake 4458 75 ml   Output 2150 ml   Net 2308 75 ml       Physical Exam:   Physical Exam  Constitutional:       General: He is not in acute distress  Appearance: He is not ill-appearing or toxic-appearing  HENT:      Head: Normocephalic  Nose: Nose normal       Mouth/Throat:      Mouth: Mucous membranes are moist    Eyes:      General: No scleral icterus  Right eye: No discharge  Left eye: No discharge  Pupils: Pupils are equal, round, and reactive to light  Cardiovascular:      Rate and Rhythm: Normal rate  Pulmonary:      Effort: Pulmonary effort is normal  No respiratory distress  Breath sounds: No stridor  No wheezing, rhonchi or rales  Chest:      Chest wall: No tenderness  Abdominal:      General: Abdomen is flat  There is no distension  Palpations: There is no mass  Tenderness: There is no abdominal tenderness   There is no left CVA tenderness, guarding or rebound  Hernia: No hernia is present  Musculoskeletal:         General: No swelling, tenderness, deformity or signs of injury  Right lower leg: No edema  Left lower leg: No edema  Skin:     Capillary Refill: Capillary refill takes less than 2 seconds  Coloration: Skin is not jaundiced or pale  Findings: No bruising, erythema, lesion or rash  Neurological:      Mental Status: He is alert  Cranial Nerves: No cranial nerve deficit  Sensory: No sensory deficit  Motor: No weakness  Gait: Gait normal       Deep Tendon Reflexes: Reflexes normal       Comments: RLE weakness and hand weakness      Psychiatric:         Mood and Affect: Mood normal           Additional Data:     Labs:  Results from last 7 days   Lab Units 03/08/22  0535   WBC Thousand/uL 9 58   HEMOGLOBIN g/dL 13 1   HEMATOCRIT % 36 9   PLATELETS Thousands/uL 215   NEUTROS PCT % 87*   LYMPHS PCT % 10*   MONOS PCT % 2*   EOS PCT % 0     Results from last 7 days   Lab Units 03/08/22  0535   SODIUM mmol/L 137   POTASSIUM mmol/L 3 8   CHLORIDE mmol/L 107   CO2 mmol/L 26   BUN mg/dL 13   CREATININE mg/dL 0 82   ANION GAP mmol/L 4   CALCIUM mg/dL 9 4   ALBUMIN g/dL 3 4*   TOTAL BILIRUBIN mg/dL 0 58   ALK PHOS U/L 57   ALT U/L 18   AST U/L 16   GLUCOSE RANDOM mg/dL 156*     Results from last 7 days   Lab Units 03/09/22  0438   INR  1 25*     Results from last 7 days   Lab Units 03/09/22  0932 03/08/22  2110 03/08/22  1812 03/04/22  1520   POC GLUCOSE mg/dl 154* 139 188* 84     Results from last 7 days   Lab Units 03/05/22  0414   HEMOGLOBIN A1C % 6 3*           Lines/Drains:  Invasive Devices  Report    Peripheral Intravenous Line            Peripheral IV 03/08/22 Left Forearm 1 day                      Imaging: No pertinent imaging reviewed      Recent Cultures (last 7 days):         Last 24 Hours Medication List:   Current Facility-Administered Medications   Medication Dose Route Frequency Provider Last Rate    acetaminophen  650 mg Oral Q4H PRN Jose Crystal MD      atorvastatin  40 mg Oral QPM Jose Crystal MD      dexamethasone  4 mg Intravenous Q6H [address] Chris Russoanchi [facility], [place]      DULoxetine  60 mg Oral HS Jose Crystal MD      flecainide  100 mg Oral BID Jose Crystal MD      heparin (porcine)  3-30 Units/kg/hr (Order-Specific) Intravenous Titrated Leyla Campos MD 16 Units/kg/hr (03/09/22 0610)   Sherwin Sanchez insulin lispro  1-5 Units Subcutaneous HS Santos Hawkins MD      insulin lispro  2-12 Units Subcutaneous 4 times day Santos Hawkins MD      metoprolol succinate  100 mg Oral Daily Jose Crystal MD      pantoprazole  40 mg Oral Early Morning Santos Hawkins MD      sodium chloride  75 mL/hr Intravenous Continuous Jose Crystal MD 75 mL/hr (03/09/22 0017)        Today, Patient Was Seen By: Santos Hawkins MD    **Please Note: This note may have been constructed using a voice recognition system  **

## 2022-03-09 NOTE — ASSESSMENT & PLAN NOTE
· On Coumadin at home  · Continue heart rate control medications  · POD 4  · Daily INR while bridging to warfarin with heparin gtt (goal 2-3)--> started 3/14

## 2022-03-09 NOTE — ASSESSMENT & PLAN NOTE
· Uncontrolled, likely due to steroid use; improved with addition of hydralazine and increased amlodipine  · Continue home blood pressure meds   · Cardiology following

## 2022-03-09 NOTE — CONSULTS
Consultation - Cardiology Team One  Enrique العراقي 71 y o  male MRN: [id number]  Unit/Bed#: -39 Encounter: [id number]    Inpatient consult to Cardiology  Consult performed by: DEEPTHI Anthony  Consult ordered by: Alvaro Saenz MD          Physician Requesting Consult: Alvaro Saenz, *  Reason for Consult / Principal Problem: Pre operative risk assessment       Assessment    1  Pre operative risk assessment  2  Cervical myelopathy   3  Paroxysmal atrial fibrillation/flutter  4  Hypertension   5  Aortic stenosis   6  Hyperlipidemia   7  WILL   8  Factor V Leiden On heparin gtt        Plan  Reviewed ECG showing sinus rhythm  On flecainide and metoprolol XL for rhythm control   BP stable: 141/82  Echocardiogram 3/4/2022 showed EF 60%, grade II diastolic dysfunction, LA moderately dilated, RA mildly dilated, moderate aortic stenosis, mild MR and mild TR   Patient reports no exertional SOB or chest pain  His activity is limited due to OA but he is able to walk a block and up a flight of stairs without complaint of chest pain  Patient at acceptable risk from cardiac standpoint to proceed to OR for anterior cervical diskectomy fixation and fusion, timing TBD  Will continue to follow       History of Present Illness   HPI: Enrique العراقي is a 71y o  year old male who has paroxysmal atrial fibrillation/flutter, s/p atrial flutter ablation, hypertension, hyperlipidemia, WILL, OA, DVT hx and factor V Leiden  He  follows with cardiologist Dr Kolton Estes  He originally presented to [facility] 3/4/2022 with bilateral lower extremity weakness and intermittent R sided neck discomfort associated with R arm numbness, tingling and weakened  strength  MRI c spine showed C5-6 severe spinal stenosis  He was transferred to [facility] for further management by neurosurgery  Neurosurgery planning to do anterior cervical diskectomy fixation and fusion with timing TBD       Cardiology consulted for pre operative risk assessment  He lives at home with his wife  He is fairly active but limited recently due to worsening osteoarthritis of bilateral knees  He reports he does all grocery shopping and helps with household chores  He denies exertional chest pain or SOB  Echocardiogram 3/4/2022 showed EF 60%, grade II diastolic dysfunction, LA moderately dilated, RA mildly dilated, moderate aortic stenosis, mild MR and mild TR     Nuclear stress test 9/21/2018 showed normal study after maximal exercise  Myocardial perfusion imaging was normal at rest and with stress  LV systolic function was normal      EKG reviewed personally:  Sinus bradycardia with 1st degree AV block and RBBB  Ventricular rate 59 bpm  NJ interval 214 ms  QRSD 170 ms  QT interval 522 ms  QTc interval 516 ms     Telemetry reviewed personally:   No telemetry     Review of Systems   Constitutional: Negative for chills, fever and malaise/fatigue  HENT: Negative for congestion  Cardiovascular: Negative for chest pain, dyspnea on exertion, leg swelling, orthopnea and palpitations  Respiratory: Negative for shortness of breath  Musculoskeletal: Negative for falls  Gastrointestinal: Negative for bloating, nausea and vomiting  Neurological: Negative for dizziness and light-headedness  Psychiatric/Behavioral: Negative for altered mental status  All other systems reviewed and are negative      Historical Information   Past Medical History:   Diagnosis Date    Acute venous embolism and thrombosis of deep vessels of proximal lower extremity (HCC)     Last assessed: 5/18/15    Arthritis     Cellulitis     LE    CPAP (continuous positive airway pressure) dependence     Factor V Leiden ([facility] [address] )     Forgetfulness     [place] (hard of hearing)     Paroxysmal atrial fibrillation ([facility] [address] )     Last assessed: 11/2/15    Sleep apnea      Past Surgical History:   Procedure Laterality Date    BACK SURGERY      Lower    COLON SURGERY      COLONOSCOPY       Social History     Substance and Sexual Activity   Alcohol Use Not Currently     Social History     Substance and Sexual Activity   Drug Use No     Social History     Tobacco Use   Smoking Status Never Smoker   Smokeless Tobacco Never Used     Family History:   Family History   Problem Relation Age of Onset    Lung cancer Mother     No Known Problems Father     Heart attack Brother     Atrial fibrillation Brother     Emphysema Sister        Meds/Allergies   all current active meds have been reviewed and current meds:   Current Facility-Administered Medications   Medication Dose Route Frequency    acetaminophen (TYLENOL) tablet 650 mg  650 mg Oral Q4H PRN    atorvastatin (LIPITOR) tablet 40 mg  40 mg Oral QPM    dexamethasone (DECADRON) injection 4 mg  4 mg Intravenous Q6H [address]    DULoxetine (CYMBALTA) delayed release capsule 60 mg  60 mg Oral HS    flecainide (TAMBOCOR) tablet 100 mg  100 mg Oral BID    heparin (porcine) 25,000 Units in sodium chloride 0 45 % 250 mL infusion  3-30 Units/kg/hr (Order-Specific) Intravenous Titrated    insulin lispro (HumaLOG) 100 units/mL subcutaneous injection 1-5 Units  1-5 Units Subcutaneous HS    insulin lispro (HumaLOG) 100 units/mL subcutaneous injection 2-12 Units  2-12 Units Subcutaneous 4 times day    metoprolol succinate (TOPROL-XL) 24 hr tablet 100 mg  100 mg Oral Daily    pantoprazole (PROTONIX) EC tablet 40 mg  40 mg Oral Early Morning    sodium chloride 0 9 % infusion  75 mL/hr Intravenous Continuous     heparin (porcine), 3-30 Units/kg/hr (Order-Specific), Last Rate: 16 Units/kg/hr (03/09/22 0610)  sodium chloride, 75 mL/hr, Last Rate: 75 mL/hr (03/09/22 0017)        Allergies   Allergen Reactions    Morphine GI Intolerance    Vitamin K Other (See Comments)     On coumadin-patient sensitive to vitamin K amounts in meds etc        Objective   Vitals: Blood pressure 141/82, pulse 56, temperature 98 °F (36 7 °C), resp   rate 18, SpO2 96 % ,     There is no height or weight on file to calculate BMI ,     Systolic (97YVP), HAB:639 , Min:141 , DIH:191     Diastolic (25UYY), YCH:70, Min:73, Max:82      Intake/Output Summary (Last 24 hours) at 3/9/2022 0841  Last data filed at 3/8/2022 2119  Gross per 24 hour   Intake 4458 75 ml   Output 1950 ml   Net 2508 75 ml     Weight (last 2 days)     None        Invasive Devices  Report    Peripheral Intravenous Line            Peripheral IV 03/08/22 Left Forearm 1 day              Physical Exam  Constitutional:       General: He is not in acute distress  Appearance: He is obese  HENT:      Head: Normocephalic  Mouth/Throat:      Mouth: Mucous membranes are moist    Cardiovascular:      Rate and Rhythm: Normal rate and regular rhythm  Pulses: Normal pulses  Heart sounds: Murmur heard  Comments: Grade II systolic murmur   Pulmonary:      Effort: Pulmonary effort is normal  No respiratory distress  Breath sounds: Normal breath sounds  Abdominal:      General: Bowel sounds are normal       Palpations: Abdomen is soft  Musculoskeletal:         General: No swelling  Normal range of motion  Cervical back: Neck supple  Skin:     General: Skin is warm and dry  Capillary Refill: Capillary refill takes less than 2 seconds  Neurological:      General: No focal deficit present  Mental Status: He is alert and oriented to person, place, and time     Psychiatric:         Mood and Affect: Mood normal          LABORATORY RESULTS:      CBC with diff:   Results from last 7 days   Lab Units 03/08/22  0535 03/07/22  0512 03/06/22  1326 03/06/22  0448 03/05/22  0414 03/04/22  1454   WBC Thousand/uL 9 58 7 20 4 32 4 29* 4 40 5 22   HEMOGLOBIN g/dL 13 1 13 4 14 4 14 0 13 2 14 1   HEMATOCRIT % 36 9 41 1 42 5 40 2 40 9 42 9   MCV fL 89 93 89 88 92 90   PLATELETS Thousands/uL 215 220 206 191 189 221   MCH pg 30 7 30 2 30 3 30 7 29 7 29 7   MCHC g/dL 35 5 32 6 33 9 34 8 32 3 32 9   RDW % 12 7 12 6 12 5 12 8 12 5 12 4   MPV fL 9 8 10 5 9 4 10 1 9 2 9 8   NRBC AUTO /100 WBCs 0 0  --  0 0 0       CMP:  Results from last 7 days   Lab Units 03/08/22  0535 03/07/22  0512 03/06/22  0448 03/05/22  0414 03/04/22  1454   POTASSIUM mmol/L 3 8 3 6 3 4* 3 8 4 2   CHLORIDE mmol/L 107 106 105 102 101   CO2 mmol/L 26 24 27 29 31   BUN mg/dL 13 13 12 12 11   CREATININE mg/dL 0 82 0 68 0 66 0 76 0 76   CALCIUM mg/dL 9 4 9 3 9 1 8 6 9 2   AST U/L 16 16  --   --  20   ALT U/L 18 18  --   --  24   ALK PHOS U/L 57 62  --   --  69   EGFR ml/min/1 73sq m 88 96 97 91 91       BMP:  Results from last 7 days   Lab Units 03/08/22  0535 03/07/22  0512 03/06/22  0448 03/05/22  0414 03/04/22  1454   POTASSIUM mmol/L 3 8 3 6 3 4* 3 8 4 2   CHLORIDE mmol/L 107 106 105 102 101   CO2 mmol/L 26 24 27 29 31   BUN mg/dL 13 13 12 12 11   CREATININE mg/dL 0 82 0 68 0 66 0 76 0 76   CALCIUM mg/dL 9 4 9 3 9 1 8 6 9 2        Lab Results   Component Value Date    NTBNP 121 03/04/2022        Results from last 7 days   Lab Units 03/05/22  0414   HEMOGLOBIN A1C % 6 3*          Results from last 7 days   Lab Units 03/05/22  0414   TSH 3RD GENERATON uIU/mL 2 091       Results from last 7 days   Lab Units 03/09/22  0438 03/07/22  0512 03/06/22  1326 03/06/22  0448 03/05/22  0414 03/04/22  1454   INR  1 25* 1 77* 1 83* 1 96* 2 00* 2 02*     Lipid Profile:   Lab Results   Component Value Date    CHOL 151 10/25/2017    CHOL 169 11/03/2016    CHOL 176 09/10/2015     Lab Results   Component Value Date    HDL 50 03/05/2022    HDL 47 11/02/2021    HDL 65 11/05/2020     Lab Results   Component Value Date    LDLCALC 123 (H) 03/05/2022    LDLCALC 107 (H) 11/02/2021    LDLCALC 128 (H) 11/05/2020     Lab Results   Component Value Date    TRIG 164 (H) 03/05/2022    TRIG 181 (H) 11/02/2021    TRIG 116 11/05/2020         Cardiac testing:   Results for orders placed during the hospital encounter of 04/03/18    Echo complete with contrast if indicated    Narrative  [address]  [place]  Sushila Maynard, [address]  [phone]    Transthoracic Echocardiogram  2D, M-mode, Doppler, and Color Doppler    Study date:  2018    Patient: Richard Back  MR number: [id number]  Account number: [de-identified]  : 1950  Age: 80 years  Gender: Male  Status: Outpatient  Location: [address] [place] [address]  Height: 72 in  Weight: 277 4 lb  BP: 116/ 78 mmHg    Indications: Atrial fibrillation  Diagnoses: I48 0 - Atrial fibrillation    Sonographer:  KAY Peng RDCS RVT  Primary Physician:  Ross Fishman MD  Referring Physician:  Morena Campo MD  Group:  [facility]  Interpreting Physician:  Andreas East MD    SUMMARY    LEFT VENTRICLE:  Systolic function was mildly reduced by visual assessment  Ejection fraction was estimated to be 45 %  There was mild diffuse hypokinesis  Wall thickness was mildly increased  LEFT ATRIUM:  The atrium was mildly dilated  RIGHT ATRIUM:  The atrium was mildly dilated  MITRAL VALVE:  There was mild regurgitation  TRICUSPID VALVE:  There was mild regurgitation  HISTORY: PRIOR HISTORY: Hyperlipidemia, Coumadin, Sleep apnea  PRIOR PROCEDURES: Arrhythmia ablation  PROCEDURE: The study was performed in the [address] [place] [address]  This was a routine study  The transthoracic approach was used  The study included complete 2D imaging, M-mode, complete spectral Doppler, and color Doppler  Images were obtained from the parasternal, apical, subcostal, and suprasternal notch acoustic windows  Echocardiographic views were limited due to decreased penetration and lung interference  This was a technically difficult study  LEFT VENTRICLE: Size was normal  Systolic function was mildly reduced by visual assessment  Ejection fraction was estimated to be 45 %  There was mild diffuse hypokinesis   Wall thickness was mildly increased  DOPPLER: Transmitral flow  pattern: atrial fibrillation  RIGHT VENTRICLE: The size was normal  Systolic function was normal  DOPPLER: Systolic pressure was not estimated  LEFT ATRIUM: The atrium was mildly dilated  RIGHT ATRIUM: The atrium was mildly dilated  MITRAL VALVE: Valve structure was normal  There was normal leaflet separation  DOPPLER: The transmitral velocity was within the normal range  There was no evidence for stenosis  There was mild regurgitation  AORTIC VALVE: The valve was trileaflet  Leaflets exhibited normal thickness, mild calcification, and normal cuspal separation  DOPPLER: Transaortic velocity was within the normal range  There was no evidence for stenosis  There was no  regurgitation  TRICUSPID VALVE: The valve structure was normal  There was normal leaflet separation  DOPPLER: The transtricuspid velocity was within the normal range  There was no evidence for stenosis  There was mild regurgitation  PULMONIC VALVE: Leaflets exhibited normal thickness, no calcification, and normal cuspal separation  DOPPLER: The transpulmonic velocity was within the normal range  There was no regurgitation  PERICARDIUM: There was no pericardial effusion  AORTA: The root exhibited normal size  SYSTEMIC VEINS: IVC: The inferior vena cava was normal in size and course   Respirophasic changes were normal     SYSTEM MEASUREMENT TABLES    2D  %FS: 32 47 %  Ao Diam: 3 04 cm  EDV(Teich): 131 46 ml  EF(Teich): 60 39 %  ESV(Cube): 44 15 ml  ESV(Teich): 52 08 ml  IVSd: 1 17 cm  LA Area: 25 58 cm2  LA Diam: 5 06 cm  LVEDV MOD A4C: 85 53 ml  LVEF MOD A4C: 57 2 %  LVESV MOD A4C: 36 61 ml  LVIDd: 5 23 cm  LVIDs: 3 53 cm  LVLd A4C: 7 43 cm  LVLs A4C: 6 23 cm  LVPWd: 1 17 cm  RA Area: 20 53 cm2  RV Diam : 4 cm  SI(Cube): 40 49 ml/m2  SI(Teich): 32 4 ml/m2  SV MOD A4C: 48 92 ml  SV(Cube): 99 21 ml  SV(Teich): 79 38 ml    MM  TAPSE: 2 06 cm    PW  E': 0 08 m/s    [facility] Accredited [facility]chocardiography [facility]    Prepared and electronically signed by    Shirley Vazquez MD  Signed 2018 12:00:40    No results found for this or any previous visit  No valid procedures specified  Results for orders placed during the hospital encounter of 18    NM myocardial perfusion spect (stress and/or rest)    Narrative  [employer] [address]    Stress Gated SPECT Myocardial Perfusion Imaging after Exercise    Patient: Julia Norwood  MR number: [id number]  Account number: [de-identified]  : 1950  Age: 80 years  Gender: Male  Status: Outpatient  Location: [address] [place] [address]  Height: 72 in  Weight: 258 lb  BP: 116/ 93 mmHg    Allergies: MORPHINE    Diagnosis: I48 1 - Atrial flutter    Primary Physician:  Reginaldo Gonzalez MD  RN:  Juan Antonio Nicholas  Referring Physician:  Tarun Mendieta MD  Technician:  Tennis Nayan:  [facility]  Interpreting Physician:  Tarun Mendieta MD    HISTORY: Factor V Leiden, Ablation, WILL, Cardioversion  The patient is a 80year old  male  Chest pain status: no chest pain  Coronary artery disease risk factors: dyslipidemia and family history of premature coronary artery  disease  Cardiovascular history: arrhythmia  Co-morbidity: obesity  Previous test results: abnormal ECG  PHYSICAL EXAM: Baseline physical exam screening: no wheezes audible  REST ECG: Atrial fibrillation  PROCEDURE: The study was performed in the the [address] [place] [address]  Treadmill exercise testing was performed, using the Nirmal protocol  Gated SPECT myocardial perfusion imaging was performed during stress  Systolic blood  pressure was 116 mmHg, at the start of the study  Diastolic blood pressure was 93 mmHg, at the start of the study  The heart rate was 87 bpm, at the start of the study  IV double checked      NIRMAL PROTOCOL:  HR bpm SBP mmHg DBP mmHg Symptoms  Baseline 87 116 93 none  Stage 1 120 100 71 leg pain  Stage 2 144 144 72 fatigue, leg pain  Recovery 1 137 174 93 subsiding  Recovery 2 136 176 99 subsiding  Recovery 3 136 148 97 none  Recovery 5 115 137 99 none  No medications or fluids given  STRESS SUMMARY: 02 sat 97% baseline, 97% peak  Duration of exercise was 6 min and 0 sec  The patient exercised to protocol stage 2  Maximal work rate was 7 METs  Functional capacity was slightly decreased (20% to 30%)  Maximal heart rate  during stress was 164 bpm ( 107 % of maximal predicted heart rate)  The heart rate response to stress was normal  There was normal resting blood pressure with an appropriate response to stress  The rate-pressure product for the peak heart  rate and blood pressure was 02868  There was no chest pain during stress  The stress test was terminated due to leg pain and fatigue  The stress ECG was negative for ischemia and normal  Arrhythmia during stress: Persistence of atrial  fibrillation throughout the study  ISOTOPE ADMINISTRATION:  Resting isotope administration Stress isotope administration  Agent Tetrofosmin Tetrofosmin  Dose 15 8 mCi 48 2 mCi  Date 09/21/2018 09/21/2018  Injection-image interval 44 min 41 min    The radiopharmaceutical was injected one minute before the end of exercise  MYOCARDIAL PERFUSION IMAGING:  The image quality was good  Rotating projection images reveal mild diaphragmatic attenuation  Left ventricular size was normal  The TID ratio was 0 88  PERFUSION DEFECTS:  -  There were no significant perfusion defects  PERFUSION QUANTITATION:  The summed perfusion score measured 0 during stress  GATED SPECT:  The calculated left ventricular ejection fraction was 61 %  Left ventricular ejection fraction was within normal limits by visual estimate  There was no left ventricular regional abnormality  SUMMARY:  -  Stress results: Duration of exercise was 6 min and 0 sec   Maximal work rate was 7 METs  Functional capacity was slightly decreased (20% to 30%)  Target heart rate was achieved  There was no chest pain during stress  -  ECG conclusions: The stress ECG was negative for ischemia and normal  Arrhythmia during stress: Persistence of atrial fibrillation throughout the study  -  Perfusion imaging: There were no significant perfusion defects   -  Gated SPECT: The calculated left ventricular ejection fraction was 61 %  Left ventricular ejection fraction was within normal limits by visual estimate  There was no left ventricular regional abnormality  IMPRESSIONS: Normal study after maximal exercise  Myocardial perfusion imaging was normal at rest and with stress  Left ventricular systolic function was normal     Prepared and signed by    Phong Weber MD  Signed 09/21/2018 13:46:16        Imaging:   CTA head and neck with and without contrast    Result Date: 3/4/2022  Narrative: CTA NECK AND BRAIN WITH AND WITHOUT CONTRAST INDICATION: unsteady, right leg drift 4/5 strength x 3 weeks COMPARISON:   None  TECHNIQUE:  Routine CT imaging of the Brain without contrast   Post contrast imaging was performed after administration of iodinated contrast through the neck and brain  Post contrast axial 0 625 mm images timed to opacify the arterial system  3D rendering was performed on an independent workstation  MIP reconstructions performed  Coronal reconstructions were performed of the noncontrast portion of the brain  Radiation dose length product (DLP) for this visit:  988 76 832 mGy-cm   This examination, like all CT scans performed in the [facility], was performed utilizing techniques to minimize radiation dose exposure, including the use of iterative reconstruction and automated exposure control  IV Contrast:  85 mL of iohexol (OMNIPAQUE)  IMAGE QUALITY:   Diagnostic FINDINGS: NONCONTRAST BRAIN PARENCHYMA:  No intracranial mass, mass effect or midline shift   No CT signs of acute infarction  No acute parenchymal hemorrhage  VENTRICLES AND EXTRA-AXIAL SPACES:  Normal for the patient's age  VISUALIZED ORBITS AND PARANASAL SINUSES:  Unremarkable  CERVICAL VASCULATURE AORTIC ARCH AND GREAT VESSELS:  Normal aortic arch and great vessel origins  Normal visualized subclavian vessels  RIGHT VERTEBRAL ARTERY CERVICAL SEGMENT:  Moderate stenosis at the origin  The vessel is normal in caliber throughout the neck  LEFT VERTEBRAL ARTERY CERVICAL SEGMENT:  Normal origin  The vessel is normal in caliber throughout the neck  RIGHT EXTRACRANIAL CAROTID SEGMENT:  Mild atherosclerotic disease of the distal common carotid artery and proximal cervical internal carotid artery without significant stenosis compared to the more distal ICA  LEFT EXTRACRANIAL CAROTID SEGMENT:  Mild atherosclerotic disease of the distal common carotid artery and proximal cervical internal carotid artery without significant stenosis compared to the more distal ICA  NASCET criteria was used to determine the degree of internal carotid artery diameter stenosis  INTRACRANIAL VASCULATURE INTERNAL CAROTID ARTERIES:  Normal enhancement of the intracranial portions of the internal carotid arteries  Normal ophthalmic artery origins  Normal ICA terminus  ANTERIOR CIRCULATION:  Symmetric A1 segments and anterior cerebral arteries with normal enhancement  Normal anterior communicating artery  MIDDLE CEREBRAL ARTERY CIRCULATION:  M1 segment and middle cerebral artery branches demonstrate normal enhancement bilaterally  DISTAL VERTEBRAL ARTERIES:  Normal distal vertebral arteries  Posterior inferior cerebellar artery origins are normal  Normal vertebral basilar junction  BASILAR ARTERY:  Basilar artery is normal in caliber  Normal superior cerebellar arteries  POSTERIOR CEREBRAL ARTERIES: The right posterior cerebral artery arises from the basilar tip  There is fetal origin of the left posterior cerebral artery    Both demonstrate no focal stenosis  Normal posterior communicating arteries  VENOUS STRUCTURES:  Normal  NON VASCULAR ANATOMY BONY STRUCTURES:  No acute osseous abnormality  SOFT TISSUES OF THE NECK:  Normal  THORACIC INLET:  Unremarkable  Impression: 1  No evidence of acute intracranial hemorrhage  2  No evidence of occlusive disease  Workstation performed: [id number]     XR chest portable    Result Date: 3/4/2022  Narrative: CHEST INDICATION:   Chest Pain  COMPARISON:  1/23/2015 EXAM PERFORMED/VIEWS:  XR CHEST PORTABLE FINDINGS: Cardiomediastinal silhouette appears unremarkable  The lungs are clear  No pneumothorax or pleural effusion  Osseous structures appear within normal limits for patient age  Impression: No acute cardiopulmonary disease  Workstation performed: [id number]     XR knee 4+ vw left injury    Result Date: 2/26/2022  Narrative: LEFT KNEE INDICATION:   M17 12: Unilateral primary osteoarthritis, left knee  COMPARISON:  Left knee x-ray 6/5/2020 VIEWS:  XR KNEE 4+ VW LEFT INJURY Images: 6 FINDINGS: There is no acute fracture or dislocation  There is no joint effusion  Tricompartmental osteoarthritis with severe narrowing of the medial tibiofemoral joint space and osteophytes seen in all 3 compartments  There is mild genu varus  No lytic or blastic osseous lesion  There are atherosclerotic calcifications  Soft tissues are otherwise unremarkable  Impression: Severe degenerative changes of left knee  Workstation performed: [id number]     XR knee 4+ vw right injury    Result Date: 2/26/2022  Narrative: RIGHT KNEE INDICATION:   M17 11: Unilateral primary osteoarthritis, right knee  COMPARISON:  Right knee x-ray 6/5/2020 VIEWS:  XR KNEE 4+ VW RIGHT INJURY Images: 5 FINDINGS: There is no acute fracture or dislocation  There is no joint effusion  Tricompartmental osteoarthritis with severe narrowing of the lateral tibiofemoral joint space and osteophytes seen in all 3 compartments  There is mild genu valgus   No lytic or blastic osseous lesion  There are atherosclerotic calcifications  Soft tissues are otherwise unremarkable  Impression: Severe degenerative changes of right knee  Workstation performed: [id number]     MRI brain wo contrast    Result Date: 3/5/2022  Narrative: MRI BRAIN WITHOUT CONTRAST INDICATION: Rule out CVA  COMPARISON: CTA performed yesterday, MRI dated 3/27/2021  TECHNIQUE:  Sagittal T1, axial T2, axial FLAIR, axial T1, axial Hobbs and axial diffusion imaging  IMAGE QUALITY:  Diagnostic  FINDINGS: BRAIN PARENCHYMA:  There is no discrete mass, mass effect or midline shift  There is no intracranial hemorrhage  There is no evidence of acute infarction and diffusion imaging is unremarkable  Small scattered hyperintensities on T2/FLAIR imaging are noted in the periventricular and subcortical white matter demonstrating an appearance that is statistically most likely to represent mild microangiopathic change  VENTRICLES:  Mild volume loss  No hydrocephalus  SELLA AND PITUITARY GLAND:  Normal  ORBITS:  Normal  PARANASAL SINUSES: Minimal ethmoid mucosal thickening  Minimal mastoid opacification is improved compared with the previous MRI  VASCULATURE:  Evaluation of the major intracranial vasculature demonstrates appropriate flow voids  CALVARIUM AND SKULL BASE:  Normal  EXTRACRANIAL SOFT TISSUES:  Normal      Impression: No acute intracranial pathology  Mild chronic microangiopathy  Workstation performed: [id number]     MRI cervical spine w wo contrast    Result Date: 3/5/2022  Narrative: MRI CERVICAL SPINE WITH AND WITHOUT CONTRAST INDICATION: CT findings, weakness  COMPARISON:  CT dated 3/4/2022 and MRI dated 3/15/2013  TECHNIQUE:  Sagittal T1, sagittal T2, sagittal inversion recovery, axial 2D merge and axial T2  Sagittal T1 and axial T1 postcontrast   IV Contrast:  11 mL of Gadobutrol injection (SINGLE-DOSE) IMAGE QUALITY:  Motion artifact, worse on axial imaging   FINDINGS: ALIGNMENT:  Degenerative grade 1 anterolisthesis at C5-C6 is unchanged  Minimal degenerative anterolisthesis at C4-5 is less apparent  Alignment is otherwise normal  MARROW SIGNAL: Multilevel endplate degenerative changes most pronounced at C5-6 with mixed Modic type I and type II changes  No suspicious marrow signal abnormality  CERVICAL AND VISUALIZED UPPER THORACIC CORD:  Mild increased signal within the cord at C5-6 is suggested on sagittal images that may represent myelomalacia rather than edema    This cannot be confirmed on motion limited axial images  Cord is otherwise normal in signal intensity  PREVERTEBRAL AND PARASPINAL SOFT TISSUES:  Normal  VISUALIZED POSTERIOR FOSSA:  The visualized posterior fossa demonstrates no abnormal signal  CERVICAL DISC SPACES:  Severe space narrowing at C5-6 and C6-7 is stable compared with CT but progressed from previous MRI  C2-C3:  Disc complex with facet and uncovertebral hypertrophy  Mild canal stenosis  Bilateral foraminal stenosis, mild on the right and moderate on the left  C3-C4:  Disc osteophyte complex with facet, uncovertebral and ligamentum flavum hypertrophy  Moderate canal stenosis  Slight flattening of the cord  Severe bilateral foraminal stenosis  C4-C5:  Small disc osteophyte complex, facet and uncovertebral hypertrophy  Mild to moderate canal stenosis  Bilateral foraminal stenosis  C5-C6: Limited evaluation on axial imaging  Discogenic complex with facet, uncovertebral and ligamentum flavum hypertrophy  Severe canal stenosis with flattening the cord has progressed since 2013  Bilateral foraminal stenosis  C6-C7:  Disc osteophyte complex with facet and uncovertebral hypertrophy  Mild canal stenosis  Bilateral foraminal stenosis right worse than left  C7-T1:  No disc herniation or canal stenosis  Facet arthropathy with at least mild foraminal stenosis  UPPER THORACIC DISC SPACES:  Small disc osteophyte complexes that mildly indent the thecal sac without significant canal stenosis   Facet arthropathy with foraminal stenosis most pronounced at T1-T2  POSTCONTRAST IMAGING:  Normal      Impression: No acute abnormality  Motion limited    Multilevel degenerative spondylosis progressed from 2013 most severe at C5-6 with severe canal stenosis and flattening of the cord  Small focus of T2 signal within the cord at C5-C6 may represent myelomalacia  Moderate canal stenosis at C3-4 mildly indents the cord without signal abnormality  Marked multilevel foraminal stenosis  No prevertebral edema or mass  Workstation performed: [id number]     XR bone length (scanogram)    Result Date: 2/26/2022  Narrative: LEG LENGTH STUDY INDICATION:   M17 11: Unilateral primary osteoarthritis, right knee  COMPARISON:  None VIEWS:  XR BONE LENGTH (SCANOGRAM) Images: 7 FINDINGS: No fractures or pathologic lesions  Degenerative changes of bilateral knees  Bone length measurements are as follows: Right femur from femoral head to medial femoral condyle = 50 6 cm Left femur from femoral head to medial femoral condyle = 51 0 cm Right tibia from medial tibial plateau to the tibial plafond = 36 9 cm Left tibia from medial tibial plateau to the tibial plafond = 36 6 cm Total right leg length (including knee joint space) from femoral head to tibial plafond = 87 5 cm Total left leg length (including knee joint space) from femoral head to tibial plafond = 87 8 cm     Impression: Leg length measurements as above  Workstation performed: [id number]     CT recon only cervical spine (No Charge)    Result Date: 3/4/2022  Narrative: CT CERVICAL SPINE - WITHOUT CONTRAST INDICATION:   Neck pain, acute, no red flags na RUE numbness/tingling, neck pain  COMPARISON:  None  TECHNIQUE:  CT examination of the cervical spine was performed without intravenous contrast   Contiguous axial images were obtained  Sagittal and coronal reconstructions were performed  Radiation dose length product (DLP) for this visit:  0 mGy-cm     This examination, like all CT scans performed in the [facility], was performed utilizing techniques to minimize radiation dose exposure, including the use of iterative reconstruction and automated exposure control  IMAGE QUALITY:  Diagnostic  FINDINGS: ALIGNMENT:  Grade 1 anterolisthesis of C4 on C5 possibly from degenerative facet arthrosis VERTEBRAL BODIES:  No fracture  Suspect asymmetric soft tissue in the right paraspinal region  Correlate with contrast-enhanced cervical spine MRI  DEGENERATIVE CHANGES:  Multilevel facet severe arthritic changes causing multilevel neural foraminal narrowing at nearly all levels  Disc osteophyte complex and posterior endplate osteophytes causing spinal canal stenosis at C5-C6 and C6-C7  Complete loss of the disc space at C5-C6 and C6-C7 where there is endplate erosive changes most likely degenerative in the absence of suspected infection  THORACIC INLET:  Normal      Impression: Multilevel degenerative changes causing multilevel neural foraminal and spinal canal stenosis as described  Suspect asymmetric soft tissue in the right paraspinal region  Correlate with contrast-enhanced cervical spine MRI recommended to exclude paraspinal soft tissue infection  Workstation performed: [id number]     Echo follow up/limited w/ contrast if indicated    Result Date: 3/7/2022  Narrative: Lisa Lyon  Left Ventricle: Left ventricular cavity size is normal  Wall thickness is normal  The left ventricular ejection fraction is 60%  Systolic function is normal  Wall motion is normal  Diastolic function is moderately abnormal, consistent with grade II (pseudonormal) relaxation    Left Atrium: The atrium is moderately dilated    Right Atrium: The atrium is mildly dilated    Aortic Valve: The aortic valve is trileaflet  The leaflets are moderately calcified  There is moderately reduced mobility  There is trace regurgitation  There is moderate stenosis  Mean gradiet 17 mmHg, EDELMIRA 1 3 cm2    Mitral Valve:  There is mild regurgitation    Tricuspid Valve: There is mild regurgitation  Thank you for allowing us to participate in this patient's care  This pt will follow up with Dr Matheus Ventura once discharged  Counseling / Coordination of Care  Total floor / unit time spent today 45 minutes  Greater than 50% of total time was spent with the patient and / or family counseling and / or coordination of care  A description of the counseling / coordination of care: Review of history, current assessment, development of a plan  Code Status: Level 3 - DNAR and DNI    ** Please Note: Dragon 360 Dictation voice to text software may have been used in the creation of this document   **

## 2022-03-09 NOTE — ASSESSMENT & PLAN NOTE
Severe canal stenosis C5-6 with small area of signal abnormality   Presented with significant decline in ambulation along with numbness and weakness R>L over the last week  Reports multiple falls  Imaging:    MRI cervical spine with without 3/5/2022: Motion limited  Multilevel degenerative spondylosis progressed from 2013 with most severe canal stenosis noted at C5-6 with flattening of the cord and small focus of T2 signal abnormality likely representing myelomalacia  Moderate canal stenosis at C3-4 which mildly indents the cord without signal abnormality  Plan:   Continue monitor neurological exam and symptoms   Discussed etiology of patient's symptoms correlating with cervical stenosis   Patient on a trial of steroids  Currently on Decadron 4mg q 6hr  Pt reports noticing some improvement with steroids   Discussed with patient the need for surgical intervention for Cervical decompressive surgery  Procedure, along with risks and benefits discussed  Pt is amenable to surgical intervention  Also discussed with patient's wife about surgery via phone  Pt's wife stated she would be available tomorrow via phone for information about surgery   Appreciate the input of cardiology and medicine for surgical clearance  Pt cleared by Cardiology and Medicine for surgery   Given 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, prior anticoagulation (Coumadin) is currently on hold  Deferred reversing secondary to clotting disorder  3/9/22  INR at 1 25  Pt is on Heparin gtt   Repeat labs ordered for tomorrow AM  NPO after midnight  Heparin gtt to be stopped at midnight- Discussed this with RN   Tentative plan is for surgery tomorrow 3/11/22 by Dr Aggie Garcia for Cervical 360: Anterior and posterior decompression and fusion  [facility] Castell[facility] Neurosurgery will continue to follow  Call with any questions/concerns

## 2022-03-09 NOTE — ASSESSMENT & PLAN NOTE
· Continue CPAP at night  Instructions: This plan will send the code FBSD to the PM system.  DO NOT or CHANGE the price. Detail Level: Simple Price (Do Not Change): 0.00

## 2022-03-09 NOTE — CASE MANAGEMENT
Case Management Assessment & Discharge Planning Note    Patient name Monika Carlisle  Location [address] [id number] MRN [id number]  : 1950 Date 3/9/2022       Current Admission Date: 3/5/2022  Current Admission Diagnosis:Cervical myelopathy [facility])   Patient Active Problem List    Diagnosis Date Noted    [facility] 2022    Ambulatory dysfunction 2022    Weakness 2022    Pes anserinus bursitis of right knee 2021    IFG (impaired fasting glucose) 2021    Memory difficulty 03/10/2021    History of DVT of lower extremity 2021    Mixed hyperlipidemia 10/27/2020    Paroxysmal A-fib ([facility] ) 2020    Lower extremity edema 2020    Primary osteoarthritis of left knee 2020    Primary osteoarthritis of right knee 2020    Depression, major, single episode, moderate ([facility] ) 10/30/2017    Insomnia 03/10/2017    Lumbar herniated disc 03/10/2017    Erectile dysfunction 2016    Status post catheter ablation of atrial flutter 2015    Dyslipidemia 2015    Essential hypertension 2015    WILL (obstructive sleep apnea) 2015    Factor V Leiden mutation ([facility] ) 2014      LOS (days): 4  Geometric Mean LOS (GMLOS) (days): 2 90  Days to GMLOS:-0 9     OBJECTIVE:  PATIENT READMITTED TO HOSPITAL  Risk of Unplanned Readmission Score: 8     Current admission status: Inpatient    Preferred Pharmacy:   [address], [address] [place] [address],[address]  [address], [address] [address]  Phone: [phone] Fax: [phone]    Primary Care Provider: Bertha Frankel MD    Primary Insurance: [facility]  Secondary Insurance:     ASSESSMENT:  [facility] Proxies    There are no active Health Care Proxies on file         Advance Directives  Does patient have a Health Care POA?: No  Was patient offered paperwork?: Yes (Pt declines)  Does patient currently have a [facility] decision maker?: Yes, please see Health Care Proxy section  Does patient have Advance Directives?: No  Was patient offered paperwork?: Yes (Pt declines)  Primary Contact: Wife King Jose    Readmission Root Cause  30 Day Readmission: No    Patient Information  Admitted from[de-identified] Home  Mental Status: Alert  During Assessment patient was accompanied by: Not accompanied during assessment  Assessment information provided by[de-identified] Patient  Primary Caregiver: Self  Support Systems: Self,Spouse/significant other,Son  [place] of Residence: [address] [place] [address] do you live in?: [address] [place] [address] entry access options   Select all that apply : Stairs  Number of steps to enter home : 5  Do the steps have railings?: No  Type of Current Residence: [address]  Upon entering residence, is there a bedroom on the main floor (no further steps)?: No  A bedroom is located on the following floor levels of residence (select all that apply):: 2nd Floor  Upon entering residence, is there a bathroom on the main floor (no further steps)?: Yes  Number of steps to 2nd floor from main floor: One Flight  In the last 12 months, was there a time when you were not able to pay the mortgage or rent on time?: No  In the last 12 months, was there a time when you did not have a steady place to sleep or slept in a shelter (including now)?: No  Homeless/housing insecurity resource given?: N/A  Living Arrangements: Lives w/ Spouse/significant other  Is patient a ?: No    Activities of Daily Living Prior to Admission  Functional Status: Independent  Completes ADLs independently?: Yes  Ambulates independently?: Yes  Does patient use assisted devices?: Yes  Assisted Devices (DME) used: Straight Cane,Walker,Bedside Commode  Does patient currently own DME?: Yes  What DME does the patient currently own?: Bedside Commode,Straight Cane,Walker  Does patient have a history of Outpatient Therapy (PT/OT)?: No  Does the patient have a history of Short-Term Rehab?: No  Does patient have a history of HHC?: No  Does patient currently have [address]?: No (Will consider if recommended)    Patient Information Continued  Income Source: Pension/USP  Does patient have prescription coverage?: Yes  Within the past 12 months, you worried that your food would run out before you got the money to buy more : Never true  Within the past 12 months, the food you bought just didnt last and you didnt have money to get more : Never true  Food insecurity resource given?: N/A  Does patient receive dialysis treatments?: No  Does patient have a history of substance abuse?: No  Does patient have a history of Mental Health Diagnosis?: No    PHQ 2/9 Screening   Reviewed PHQ 2/9 Depression Screening Score?: No    Means of Transportation  Means of Transport to Appts[de-identified] Drives Self  In the past 12 months, has lack of transportation kept you from medical appointments or from getting medications?: No  In the past 12 months, has lack of transportation kept you from meetings, work, or from getting things needed for daily living?: No  Was application for public transport provided?: N/A        DISCHARGE DETAILS:    Discharge planning discussed with[de-identified] Patient  Freedom of Choice: Yes     CM contacted family/caregiver?: Yes  Were Treatment Team discharge recommendations reviewed with patient/caregiver?: Yes  Did patient/caregiver verbalize understanding of patient care needs?: Yes  Were patient/caregiver advised of the risks associated with not following Treatment Team discharge recommendations?: Yes    Contacts  Patient Contacts: Bebe Petty  Relationship to Patient[de-identified] Family  Contact Method: Phone  Phone Number: [phone]  Reason/Outcome: Discharge Planning,Continuity of [address]  [facility]         Is the patient interested in [address] at discharge?: No    DME Referral Provided  Referral made for DME?: No    Other Referral/Resources/Interventions Provided:  Interventions: None Indicated    Would you like to participate in our [address] service program?  : No - Declined ([facility] [employer])    Treatment Team Recommendation: Home  Discharge Destination Plan[de-identified] Home  Transport at Discharge : Family    IMM Given (Date):: 03/09/22  IMM Given to[de-identified] Patient     Additional Comments: IMM reviewed and signed  Copy given to pt and signature page is placed in MR bin for scanning      Cm met with pt at bedside to discuss cm role and dcp needs  Pt is not medically cleared for d/c  Awaiting for cards clearance  No questions or concerns at this time  Cm was consulted for Ischemic stroke  Awaiting for PT/OT recommendation  Cm department will continue to follow up  Pt preference is to be discharged home  CM reviewed d/c planning process including the following: identifying help at home, patient preference for d/c planning needs, Discharge Lounge, [facility] Meds to Bed program, availability of treatment team to discuss questions or concerns patient and/or family may have regarding understanding medications and recognizing signs and symptoms once discharged  CM also encouraged patient to follow up with all recommended appointments after discharge  Patient advised of importance for patient and family to participate in managing patients medical well being

## 2022-03-09 NOTE — RESTORATIVE TECHNICIAN NOTE
Restorative Technician Note      Patient Name: Brunilda Blancas     Activity Performed: Other (Comment) (Pt states he is standing EOB/do ROM exercises independently )  Education Provided:  Yes      Carli VILLANUEVA, Restorative Technician, [employer] [facility]

## 2022-03-09 NOTE — PLAN OF CARE
Reviewed    Problem: MOBILITY - ADULT  Goal: Maintain or return to baseline ADL function  Description: INTERVENTIONS:  -  Assess patient's ability to carry out ADLs; assess patient's baseline for ADL function and identify physical deficits which impact ability to perform ADLs (bathing, care of mouth/teeth, toileting, grooming, dressing, etc )  - Assess/evaluate cause of self-care deficits   - Assess range of motion  - Assess patient's mobility; develop plan if impaired  - Assess patient's need for assistive devices and provide as appropriate  - Encourage maximum independence but intervene and supervise when necessary  - Involve family in performance of ADLs  - Assess for home care needs following discharge   - Consider OT consult to assist with ADL evaluation and planning for discharge  - Provide patient education as appropriate  Outcome: Progressing  Goal: Maintains/Returns to pre admission functional level  Description: INTERVENTIONS:  - Perform BMAT or MOVE assessment daily    - Set and communicate daily mobility goal to care team and patient/family/caregiver     - Collaborate with rehabilitation services on mobility goals if consulted  - Out of bed for toileting  - Record patient progress and toleration of activity level   Outcome: Progressing     Problem: PAIN - ADULT  Goal: Verbalizes/displays adequate comfort level or baseline comfort level  Description: Interventions:  - Encourage patient to monitor pain and request assistance  - Assess pain using appropriate pain scale  - Administer analgesics based on type and severity of pain and evaluate response  - Implement non-pharmacological measures as appropriate and evaluate response  - Consider cultural and social influences on pain and pain management  - Notify physician/advanced practitioner if interventions unsuccessful or patient reports new pain  Outcome: Progressing     Problem: INFECTION - ADULT  Goal: Absence or prevention of progression during hospitalization  Description: INTERVENTIONS:  - Assess and monitor for signs and symptoms of infection  - Monitor lab/diagnostic results  - Monitor all insertion sites, i e  indwelling lines, tubes, and drains  - Monitor endotracheal if appropriate and nasal secretions for changes in amount and color  - [place] appropriate cooling/warming therapies per order  - Administer medications as ordered  - Instruct and encourage patient and family to use good hand hygiene technique  - Identify and instruct in appropriate isolation precautions for identified infection/condition  Outcome: Progressing  Goal: Absence of fever/infection during neutropenic period  Description: INTERVENTIONS:  - Monitor WBC    Outcome: Progressing     Problem: SAFETY ADULT  Goal: Maintain or return to baseline ADL function  Description: INTERVENTIONS:  -  Assess patient's ability to carry out ADLs; assess patient's baseline for ADL function and identify physical deficits which impact ability to perform ADLs (bathing, care of mouth/teeth, toileting, grooming, dressing, etc )  - Assess/evaluate cause of self-care deficits   - Assess range of motion  - Assess patient's mobility; develop plan if impaired  - Assess patient's need for assistive devices and provide as appropriate  - Encourage maximum independence but intervene and supervise when necessary  - Involve family in performance of ADLs  - Assess for home care needs following discharge   - Consider OT consult to assist with ADL evaluation and planning for discharge  - Provide patient education as appropriate  Outcome: Progressing  Goal: Maintains/Returns to pre admission functional level  Description: INTERVENTIONS:  - Perform BMAT or MOVE assessment daily    - Set and communicate daily mobility goal to care team and patient/family/caregiver     - Collaborate with rehabilitation services on mobility goals if consulted  - Out of bed for toileting  - Record patient progress and toleration of activity level   Outcome: Progressing  Goal: Patient will remain free of falls  Description: INTERVENTIONS:  - Educate patient/family on patient safety including physical limitations  - Instruct patient to call for assistance with activity   - Consult OT/PT to assist with strengthening/mobility   - Keep Call bell within reach  - Keep bed low and locked with side rails adjusted as appropriate  - Keep care items and personal belongings within reach  - Initiate and maintain comfort rounds  - Make Fall Risk Sign visible to staff  - Offer Toileting every 4 Hours, in advance of need  - Apply yellow socks and bracelet for high fall risk patients  - Consider moving patient to room near nurses station  Outcome: Progressing     Problem: DISCHARGE PLANNING  Goal: Discharge to home or other facility with appropriate resources  Description: INTERVENTIONS:  - Identify barriers to discharge w/patient and caregiver  - Arrange for needed discharge resources and transportation as appropriate  - Identify discharge learning needs (meds, wound care, etc )  - Arrange for interpretive services to assist at discharge as needed  - Refer to Case Management Department for coordinating discharge planning if the patient needs post-hospital services based on physician/advanced practitioner order or complex needs related to functional status, cognitive ability, or social support system  Outcome: Progressing     Problem: Knowledge Deficit  Goal: Patient/family/caregiver demonstrates understanding of disease process, treatment plan, medications, and discharge instructions  Description: Complete learning assessment and assess knowledge base    Interventions:  - Provide teaching at level of understanding  - Provide teaching via preferred learning methods  Outcome: Progressing     Problem: Neurological Deficit  Goal: Neurological status is stable or improving  Description: Interventions:  - Monitor and assess patient's level of consciousness, motor function, sensory function, and level of assistance needed for ADLs  - Monitor and report changes from baseline  Collaborate with interdisciplinary team to initiate plan and implement interventions as ordered  - Provide and maintain a safe environment  - Consider seizure precautions  - Consider fall precautions  - Consider aspiration precautions  - Consider bleeding precautions  Outcome: Progressing     Problem: Activity Intolerance/Impaired Mobility  Goal: Mobility/activity is maintained at optimum level for patient  Description: Interventions:  - Assess and monitor patient  barriers to mobility and need for assistive/adaptive devices  - Assess patient's emotional response to limitations  - Collaborate with interdisciplinary team and initiate plans and interventions as ordered  - Encourage independent activity per ability   - Maintain proper body alignment  - Perform active/passive rom as tolerated/ordered  - Plan activities to conserve energy   - Turn patient as appropriate  Outcome: Progressing     Problem: Communication Impairment  Goal: Ability to express needs and understand communication  Description: Assess patient's communication skills and ability to understand information  Patient will demonstrate use of effective communication techniques, alternative methods of communication and understanding even if not able to speak  - Encourage communication and provide alternate methods of communication as needed  - Collaborate with case management/ for discharge needs  - Include patient/family/caregiver in decisions related to communication  Outcome: Progressing     Problem: Potential for Aspiration  Goal: Non-ventilated patient's risk of aspiration is minimized  Description: Assess and monitor vital signs, respiratory status, and labs (WBC)  Monitor for signs of aspiration (tachypnea, cough, rales, wheezing, cyanosis, fever)      - Assess and monitor patient's ability to swallow  - Place patient up in chair to eat if possible  - HOB up at 90 degrees to eat if unable to get patient up into chair   - Supervise patient during oral intake  - Instruct patient/ family to take small bites  - Instruct patient/ family to take small single sips when taking liquids  - Follow patient-specific strategies generated by speech pathologist   Outcome: Progressing  Goal: Ventilated patient's risk of aspiration is minimized  Description: Assess and monitor vital signs, respiratory status, airway cuff pressure, and labs (WBC)  Monitor for signs of aspiration (tachypnea, cough, rales, wheezing, cyanosis, fever)  - Elevate head of bed 30 degrees if patient has tube feeding   - Monitor tube feeding  Outcome: Progressing     Problem: Nutrition  Goal: Nutrition/Hydration status is improving  Description: Monitor and assess patient's nutrition/hydration status for malnutrition (ex- brittle hair, bruises, dry skin, pale skin and conjunctiva, muscle wasting, smooth red tongue, and disorientation)  Collaborate with interdisciplinary team and initiate plan and interventions as ordered  Monitor patient's weight and dietary intake as ordered or per policy  Utilize nutrition screening tool and intervene per policy  Determine patient's food preferences and provide high-protein, high-caloric foods as appropriate  - Assist patient with eating   - Allow adequate time for meals   - Encourage patient to take dietary supplement as ordered  - Collaborate with clinical nutritionist   - Include patient/family/caregiver in decisions related to nutrition  Outcome: Progressing     Problem: Nutrition/Hydration-ADULT  Goal: Nutrient/Hydration intake appropriate for improving, restoring or maintaining nutritional needs  Description: Monitor and assess patient's nutrition/hydration status for malnutrition  Collaborate with interdisciplinary team and initiate plan and interventions as ordered    Monitor patient's weight and dietary intake as ordered or per policy  Utilize nutrition screening tool and intervene as necessary  Determine patient's food preferences and provide high-protein, high-caloric foods as appropriate       INTERVENTIONS:  - Monitor oral intake, urinary output, labs, and treatment plans  - Assess nutrition and hydration status and recommend course of action  - Evaluate amount of meals eaten  - Assist patient with eating if necessary   - Allow adequate time for meals  - Recommend/ encourage appropriate diets, oral nutritional supplements, and vitamin/mineral supplements  - Order, calculate, and assess calorie counts as needed  - Recommend, monitor, and adjust tube feedings and TPN/PPN based on assessed needs  - Assess need for intravenous fluids  - Provide specific nutrition/hydration education as appropriate  - Include patient/family/caregiver in decisions related to nutrition  Outcome: Progressing

## 2022-03-10 PROBLEM — R00.1 SINUS BRADYCARDIA: Status: ACTIVE | Noted: 2022-03-10

## 2022-03-10 LAB
ANION GAP SERPL CALCULATED.3IONS-SCNC: 3 MMOL/L (ref 4–13)
APTT PPP: 147 SECONDS (ref 23–37)
APTT PPP: 157 SECONDS (ref 23–37)
APTT PPP: 45 SECONDS (ref 23–37)
ATRIAL RATE: 45 BPM
ATRIAL RATE: 45 BPM
BUN SERPL-MCNC: 17 MG/DL (ref 5–25)
CALCIUM SERPL-MCNC: 9.5 MG/DL (ref 8.3–10.1)
CHLORIDE SERPL-SCNC: 104 MMOL/L (ref 100–108)
CO2 SERPL-SCNC: 30 MMOL/L (ref 21–32)
CREAT SERPL-MCNC: 0.87 MG/DL (ref 0.6–1.3)
ERYTHROCYTE [DISTWIDTH] IN BLOOD BY AUTOMATED COUNT: 12.7 % (ref 11.6–15.1)
GFR SERPL CREATININE-BSD FRML MDRD: 86 ML/MIN/1.73SQ M
GLUCOSE SERPL-MCNC: 116 MG/DL (ref 65–140)
GLUCOSE SERPL-MCNC: 123 MG/DL (ref 65–140)
GLUCOSE SERPL-MCNC: 132 MG/DL (ref 65–140)
GLUCOSE SERPL-MCNC: 138 MG/DL (ref 65–140)
GLUCOSE SERPL-MCNC: 143 MG/DL (ref 65–140)
GLUCOSE SERPL-MCNC: 149 MG/DL (ref 65–140)
GLUCOSE SERPL-MCNC: 160 MG/DL (ref 65–140)
HCT VFR BLD AUTO: 39.7 % (ref 36.5–49.3)
HGB BLD-MCNC: 13.9 G/DL (ref 12–17)
INR PPP: 1.22 (ref 0.84–1.19)
MAGNESIUM SERPL-MCNC: 2 MG/DL (ref 1.6–2.6)
MCH RBC QN AUTO: 30.7 PG (ref 26.8–34.3)
MCHC RBC AUTO-ENTMCNC: 35 G/DL (ref 31.4–37.4)
MCV RBC AUTO: 88 FL (ref 82–98)
P AXIS: 29 DEGREES
P AXIS: 32 DEGREES
PLATELET # BLD AUTO: 237 THOUSANDS/UL (ref 149–390)
PMV BLD AUTO: 10.5 FL (ref 8.9–12.7)
POTASSIUM SERPL-SCNC: 3.8 MMOL/L (ref 3.5–5.3)
PR INTERVAL: 170 MS
PR INTERVAL: 188 MS
PROTHROMBIN TIME: 14.9 SECONDS (ref 11.6–14.5)
QRS AXIS: 92 DEGREES
QRS AXIS: 96 DEGREES
QRSD INTERVAL: 190 MS
QRSD INTERVAL: 194 MS
QT INTERVAL: 546 MS
QT INTERVAL: 556 MS
QTC INTERVAL: 472 MS
QTC INTERVAL: 480 MS
RBC # BLD AUTO: 4.53 MILLION/UL (ref 3.88–5.62)
SODIUM SERPL-SCNC: 137 MMOL/L (ref 136–145)
T WAVE AXIS: -25 DEGREES
T WAVE AXIS: -27 DEGREES
TSH SERPL DL<=0.05 MIU/L-ACNC: 0.64 UIU/ML (ref 0.36–3.74)
VENTRICULAR RATE: 45 BPM
VENTRICULAR RATE: 45 BPM
WBC # BLD AUTO: 9.4 THOUSAND/UL (ref 4.31–10.16)

## 2022-03-10 PROCEDURE — 93010 ELECTROCARDIOGRAM REPORT: CPT | Performed by: INTERNAL MEDICINE

## 2022-03-10 PROCEDURE — 80048 BASIC METABOLIC PNL TOTAL CA: CPT | Performed by: PHYSICIAN ASSISTANT

## 2022-03-10 PROCEDURE — 83735 ASSAY OF MAGNESIUM: CPT | Performed by: PHYSICIAN ASSISTANT

## 2022-03-10 PROCEDURE — 85610 PROTHROMBIN TIME: CPT | Performed by: INTERNAL MEDICINE

## 2022-03-10 PROCEDURE — 99232 SBSQ HOSP IP/OBS MODERATE 35: CPT | Performed by: INTERNAL MEDICINE

## 2022-03-10 PROCEDURE — 82948 REAGENT STRIP/BLOOD GLUCOSE: CPT

## 2022-03-10 PROCEDURE — 85027 COMPLETE CBC AUTOMATED: CPT | Performed by: INTERNAL MEDICINE

## 2022-03-10 PROCEDURE — 85730 THROMBOPLASTIN TIME PARTIAL: CPT | Performed by: STUDENT IN AN ORGANIZED HEALTH CARE EDUCATION/TRAINING PROGRAM

## 2022-03-10 PROCEDURE — 93005 ELECTROCARDIOGRAM TRACING: CPT

## 2022-03-10 PROCEDURE — 85730 THROMBOPLASTIN TIME PARTIAL: CPT | Performed by: INTERNAL MEDICINE

## 2022-03-10 PROCEDURE — 99233 SBSQ HOSP IP/OBS HIGH 50: CPT | Performed by: INTERNAL MEDICINE

## 2022-03-10 PROCEDURE — 99232 SBSQ HOSP IP/OBS MODERATE 35: CPT | Performed by: NEUROLOGICAL SURGERY

## 2022-03-10 PROCEDURE — 84443 ASSAY THYROID STIM HORMONE: CPT | Performed by: PHYSICIAN ASSISTANT

## 2022-03-10 RX ORDER — AMLODIPINE BESYLATE 5 MG/1
5 TABLET ORAL DAILY
Status: DISCONTINUED | OUTPATIENT
Start: 2022-03-10 | End: 2022-03-12

## 2022-03-10 RX ORDER — POTASSIUM CHLORIDE 20 MEQ/1
20 TABLET, EXTENDED RELEASE ORAL ONCE
Status: COMPLETED | OUTPATIENT
Start: 2022-03-10 | End: 2022-03-10

## 2022-03-10 RX ADMIN — POTASSIUM CHLORIDE 20 MEQ: 1500 TABLET, EXTENDED RELEASE ORAL at 05:03

## 2022-03-10 RX ADMIN — FLECAINIDE ACETATE 100 MG: 100 TABLET ORAL at 17:16

## 2022-03-10 RX ADMIN — SODIUM CHLORIDE 75 ML/HR: 9 INJECTION, SOLUTION INTRAVENOUS at 02:58

## 2022-03-10 RX ADMIN — DULOXETINE HYDROCHLORIDE 60 MG: 60 CAPSULE, DELAYED RELEASE ORAL at 20:53

## 2022-03-10 RX ADMIN — PANTOPRAZOLE SODIUM 40 MG: 40 TABLET, DELAYED RELEASE ORAL at 05:04

## 2022-03-10 RX ADMIN — DEXAMETHASONE SODIUM PHOSPHATE 4 MG: 4 INJECTION INTRA-ARTICULAR; INTRALESIONAL; INTRAMUSCULAR; INTRAVENOUS; SOFT TISSUE at 17:16

## 2022-03-10 RX ADMIN — ATORVASTATIN CALCIUM 40 MG: 40 TABLET, FILM COATED ORAL at 17:16

## 2022-03-10 RX ADMIN — DEXAMETHASONE SODIUM PHOSPHATE 4 MG: 4 INJECTION INTRA-ARTICULAR; INTRALESIONAL; INTRAMUSCULAR; INTRAVENOUS; SOFT TISSUE at 23:21

## 2022-03-10 RX ADMIN — DEXAMETHASONE SODIUM PHOSPHATE 4 MG: 4 INJECTION INTRA-ARTICULAR; INTRALESIONAL; INTRAMUSCULAR; INTRAVENOUS; SOFT TISSUE at 05:05

## 2022-03-10 RX ADMIN — HEPARIN SODIUM 16 UNITS/KG/HR: 10000 INJECTION, SOLUTION INTRAVENOUS; SUBCUTANEOUS at 02:58

## 2022-03-10 RX ADMIN — DEXAMETHASONE SODIUM PHOSPHATE 4 MG: 4 INJECTION INTRA-ARTICULAR; INTRALESIONAL; INTRAMUSCULAR; INTRAVENOUS; SOFT TISSUE at 12:23

## 2022-03-10 RX ADMIN — AMLODIPINE BESYLATE 5 MG: 5 TABLET ORAL at 09:37

## 2022-03-10 RX ADMIN — FLECAINIDE ACETATE 100 MG: 100 TABLET ORAL at 09:37

## 2022-03-10 NOTE — PLAN OF CARE
Problem: MOBILITY - ADULT  Goal: Maintain or return to baseline ADL function  Description: INTERVENTIONS:  -  Assess patient's ability to carry out ADLs; assess patient's baseline for ADL function and identify physical deficits which impact ability to perform ADLs (bathing, care of mouth/teeth, toileting, grooming, dressing, etc )  - Assess/evaluate cause of self-care deficits   - Assess range of motion  - Assess patient's mobility; develop plan if impaired  - Assess patient's need for assistive devices and provide as appropriate  - Encourage maximum independence but intervene and supervise when necessary  - Involve family in performance of ADLs  - Assess for home care needs following discharge   - Consider OT consult to assist with ADL evaluation and planning for discharge  - Provide patient education as appropriate  Outcome: Progressing  Goal: Maintains/Returns to pre admission functional level  Description: INTERVENTIONS:  - Perform BMAT or MOVE assessment daily    - Set and communicate daily mobility goal to care team and patient/family/caregiver     - Collaborate with rehabilitation services on mobility goals if consulted  - Out of bed for toileting  - Record patient progress and toleration of activity level   Outcome: Progressing     Problem: PAIN - ADULT  Goal: Verbalizes/displays adequate comfort level or baseline comfort level  Description: Interventions:  - Encourage patient to monitor pain and request assistance  - Assess pain using appropriate pain scale  - Administer analgesics based on type and severity of pain and evaluate response  - Implement non-pharmacological measures as appropriate and evaluate response  - Consider cultural and social influences on pain and pain management  - Notify physician/advanced practitioner if interventions unsuccessful or patient reports new pain  Outcome: Progressing     Problem: INFECTION - ADULT  Goal: Absence or prevention of progression during hospitalization  Description: INTERVENTIONS:  - Assess and monitor for signs and symptoms of infection  - Monitor lab/diagnostic results  - Monitor all insertion sites, i e  indwelling lines, tubes, and drains  - Monitor endotracheal if appropriate and nasal secretions for changes in amount and color  - [place] appropriate cooling/warming therapies per order  - Administer medications as ordered  - Instruct and encourage patient and family to use good hand hygiene technique  - Identify and instruct in appropriate isolation precautions for identified infection/condition  Outcome: Progressing  Goal: Absence of fever/infection during neutropenic period  Description: INTERVENTIONS:  - Monitor WBC    Outcome: Progressing     Problem: SAFETY ADULT  Goal: Maintain or return to baseline ADL function  Description: INTERVENTIONS:  -  Assess patient's ability to carry out ADLs; assess patient's baseline for ADL function and identify physical deficits which impact ability to perform ADLs (bathing, care of mouth/teeth, toileting, grooming, dressing, etc )  - Assess/evaluate cause of self-care deficits   - Assess range of motion  - Assess patient's mobility; develop plan if impaired  - Assess patient's need for assistive devices and provide as appropriate  - Encourage maximum independence but intervene and supervise when necessary  - Involve family in performance of ADLs  - Assess for home care needs following discharge   - Consider OT consult to assist with ADL evaluation and planning for discharge  - Provide patient education as appropriate  Outcome: Progressing  Goal: Maintains/Returns to pre admission functional level  Description: INTERVENTIONS:  - Perform BMAT or MOVE assessment daily    - Set and communicate daily mobility goal to care team and patient/family/caregiver  - Collaborate with rehabilitation services on mobility goals if consulted  - Perform Range of Motion 3 times a day    - Reposition patient every 2 hours   - Dangle patient 3 times a day  - Stand patient 3 times a day  - Ambulate patient 3 times a day  - Out of bed to chair 3 times a day   - Out of bed for meals 3 times a day  - Out of bed for toileting  - Record patient progress and toleration of activity level   Outcome: Progressing  Goal: Patient will remain free of falls  Description: INTERVENTIONS:  - Educate patient/family on patient safety including physical limitations  - Instruct patient to call for assistance with activity   - Consult OT/PT to assist with strengthening/mobility   - Keep Call bell within reach  - Keep bed low and locked with side rails adjusted as appropriate  - Keep care items and personal belongings within reach  - Initiate and maintain comfort rounds  - Make Fall Risk Sign visible to staff  - Apply yellow socks and bracelet for high fall risk patients  - Consider moving patient to room near nurses station  Outcome: Progressing     Problem: DISCHARGE PLANNING  Goal: Discharge to home or other facility with appropriate resources  Description: INTERVENTIONS:  - Identify barriers to discharge w/patient and caregiver  - Arrange for needed discharge resources and transportation as appropriate  - Identify discharge learning needs (meds, wound care, etc )  - Arrange for interpretive services to assist at discharge as needed  - Refer to Case Management Department for coordinating discharge planning if the patient needs post-hospital services based on physician/advanced practitioner order or complex needs related to functional status, cognitive ability, or social support system  Outcome: Progressing     Problem: Knowledge Deficit  Goal: Patient/family/caregiver demonstrates understanding of disease process, treatment plan, medications, and discharge instructions  Description: Complete learning assessment and assess knowledge base    Interventions:  - Provide teaching at level of understanding  - Provide teaching via preferred learning methods  Outcome: Progressing     Problem: Neurological Deficit  Goal: Neurological status is stable or improving  Description: Interventions:  - Monitor and assess patient's level of consciousness, motor function, sensory function, and level of assistance needed for ADLs  - Monitor and report changes from baseline  Collaborate with interdisciplinary team to initiate plan and implement interventions as ordered  - Provide and maintain a safe environment  - Consider seizure precautions  - Consider fall precautions  - Consider aspiration precautions  - Consider bleeding precautions  Outcome: Progressing     Problem: Activity Intolerance/Impaired Mobility  Goal: Mobility/activity is maintained at optimum level for patient  Description: Interventions:  - Assess and monitor patient  barriers to mobility and need for assistive/adaptive devices  - Assess patient's emotional response to limitations  - Collaborate with interdisciplinary team and initiate plans and interventions as ordered  - Encourage independent activity per ability   - Maintain proper body alignment  - Perform active/passive rom as tolerated/ordered  - Plan activities to conserve energy   - Turn patient as appropriate  Outcome: Progressing     Problem: Communication Impairment  Goal: Ability to express needs and understand communication  Description: Assess patient's communication skills and ability to understand information  Patient will demonstrate use of effective communication techniques, alternative methods of communication and understanding even if not able to speak  - Encourage communication and provide alternate methods of communication as needed  - Collaborate with case management/ for discharge needs  - Include patient/family/caregiver in decisions related to communication    Outcome: Progressing     Problem: Potential for Aspiration  Goal: Non-ventilated patient's risk of aspiration is minimized  Description: Assess and monitor vital signs, respiratory status, and labs (WBC)  Monitor for signs of aspiration (tachypnea, cough, rales, wheezing, cyanosis, fever)  - Assess and monitor patient's ability to swallow  - Place patient up in chair to eat if possible  - HOB up at 90 degrees to eat if unable to get patient up into chair   - Supervise patient during oral intake  - Instruct patient/ family to take small bites  - Instruct patient/ family to take small single sips when taking liquids  - Follow patient-specific strategies generated by speech pathologist   Outcome: Progressing  Goal: Ventilated patient's risk of aspiration is minimized  Description: Assess and monitor vital signs, respiratory status, airway cuff pressure, and labs (WBC)  Monitor for signs of aspiration (tachypnea, cough, rales, wheezing, cyanosis, fever)  - Elevate head of bed 30 degrees if patient has tube feeding   - Monitor tube feeding  Outcome: Progressing     Problem: Nutrition  Goal: Nutrition/Hydration status is improving  Description: Monitor and assess patient's nutrition/hydration status for malnutrition (ex- brittle hair, bruises, dry skin, pale skin and conjunctiva, muscle wasting, smooth red tongue, and disorientation)  Collaborate with interdisciplinary team and initiate plan and interventions as ordered  Monitor patient's weight and dietary intake as ordered or per policy  Utilize nutrition screening tool and intervene per policy  Determine patient's food preferences and provide high-protein, high-caloric foods as appropriate  - Assist patient with eating   - Allow adequate time for meals   - Encourage patient to take dietary supplement as ordered  - Collaborate with clinical nutritionist   - Include patient/family/caregiver in decisions related to nutrition    Outcome: Progressing     Problem: Nutrition/Hydration-ADULT  Goal: Nutrient/Hydration intake appropriate for improving, restoring or maintaining nutritional needs  Description: Monitor and assess patient's nutrition/hydration status for malnutrition  Collaborate with interdisciplinary team and initiate plan and interventions as ordered  Monitor patient's weight and dietary intake as ordered or per policy  Utilize nutrition screening tool and intervene as necessary  Determine patient's food preferences and provide high-protein, high-caloric foods as appropriate       INTERVENTIONS:  - Monitor oral intake, urinary output, labs, and treatment plans  - Assess nutrition and hydration status and recommend course of action  - Evaluate amount of meals eaten  - Assist patient with eating if necessary   - Allow adequate time for meals  - Recommend/ encourage appropriate diets, oral nutritional supplements, and vitamin/mineral supplements  - Order, calculate, and assess calorie counts as needed  - Recommend, monitor, and adjust tube feedings and TPN/PPN based on assessed needs  - Assess need for intravenous fluids  - Provide specific nutrition/hydration education as appropriate  - Include patient/family/caregiver in decisions related to nutrition  Outcome: Progressing     Problem: Potential for Falls  Goal: Patient will remain free of falls  Description: INTERVENTIONS:  - Educate patient/family on patient safety including physical limitations  - Instruct patient to call for assistance with activity   - Consult OT/PT to assist with strengthening/mobility   - Keep Call bell within reach  - Keep bed low and locked with side rails adjusted as appropriate  - Keep care items and personal belongings within reach  - Initiate and maintain comfort rounds  - Make Fall Risk Sign visible to staff  - Offer Toileting every 2 Hours, in advance of need  - Initiate/Maintain bed alarm  - Obtain necessary fall risk management equipment: alarms  - Apply yellow socks and bracelet for high fall risk patients  - Consider moving patient to room near nurses station  Outcome: Progressing

## 2022-03-10 NOTE — PROGRESS NOTES
Cardiology Progress Note - Faina Salguero 71 y o  male MRN: [id number]    Unit/Bed#: -01 Encounter: [id number]      Assessment:  Principal Problem:    Cervical myelopathy ([facility] )  Active Problems:    Essential hypertension    WILL (obstructive sleep apnea)    Paroxysmal A-fib ([facility] )    Mixed hyperlipidemia    Ambulatory dysfunction      Plan:  Patient is comfortable this morning  He has no chest pain or significant dyspnea  He had no issues overnight  Neuro surgical note appreciated  Patient for surgery possibly on Friday  He is cleared from a cardiac point of view  On flecainide and metoprolol in reference to PAF  Coumadin on hold and patient on intravenous heparin  BMP today with potassium of 3 8 and creatinine of 0 87  Hemoglobin 13 9  Blood pressure elevated likely related to clinical situation  Will add amlodipine 5 mg per day  Subjective:   Patient seen and examined  No significant events overnight   negative  Objective:     Vitals: Blood pressure 165/85, pulse (!) 45, temperature 97 5 °F (36 4 °C), resp  rate 17, SpO2 97 %  , There is no height or weight on file to calculate BMI ,   Orthostatic Blood Pressures      Most Recent Value   Blood Pressure 165/85 filed at 03/10/2022 0203   Patient Position - Orthostatic VS Lying filed at 03/07/2022 0332      ,      Intake/Output Summary (Last 24 hours) at 3/10/2022 0750  Last data filed at 3/9/2022 2204  Gross per 24 hour   Intake --   Output 2200 ml   Net -2200 ml       No significant arrhythmias seen on telemetry review         Physical Exam:    GEN: Faina Salguero appears well, alert and oriented x 3, pleasant and cooperative   NECK: supple, no carotid bruits, no JVD or HJR  HEART: normal rate, regular rhythm, normal S1 and S2, no murmurs, clicks, gallops or rubs   LUNGS: clear to auscultation bilaterally; no wheezes, rales, or rhonchi   ABDOMEN: normal bowel sounds, soft, no tenderness, no distention  EXTREMITIES: peripheral pulses normal; no clubbing, cyanosis, or edema  SKIN: warm and well perfused, no suspicious lesions on exposed skin    Labs & Results:    Admission on 03/05/2022   Component Date Value    Color, UA 03/05/2022 Light Yellow     Clarity, UA 03/05/2022 Clear     Specific Gravity, UA 03/05/2022 1 016     pH, UA 03/05/2022 6 5     Leukocytes, UA 03/05/2022 Negative     Nitrite, UA 03/05/2022 Negative     Protein, UA 03/05/2022 Negative     Glucose, UA 03/05/2022 Negative     Ketones, UA 03/05/2022 Negative     Urobilinogen, UA 03/05/2022 3 0*    Bilirubin, UA 03/05/2022 Negative     Blood, UA 03/05/2022 Negative     Sodium 03/06/2022 137     Potassium 03/06/2022 3 4*    Chloride 03/06/2022 105     CO2 03/06/2022 27     ANION GAP 03/06/2022 5     BUN 03/06/2022 12     Creatinine 03/06/2022 0 66     Glucose 03/06/2022 123     Calcium 03/06/2022 9 1     eGFR 03/06/2022 97     WBC 03/06/2022 4 29*    RBC 03/06/2022 4 56     Hemoglobin 03/06/2022 14 0     Hematocrit 03/06/2022 40 2     MCV 03/06/2022 88     MCH 03/06/2022 30 7     MCHC 03/06/2022 34 8     RDW 03/06/2022 12 8     MPV 03/06/2022 10 1     Platelets 07/20/4192 191     nRBC 03/06/2022 0     Neutrophils Relative 03/06/2022 46     Immat GRANS % 03/06/2022 1     Lymphocytes Relative 03/06/2022 38     Monocytes Relative 03/06/2022 12     Eosinophils Relative 03/06/2022 2     Basophils Relative 03/06/2022 1     Neutrophils Absolute 03/06/2022 2 03     Immature Grans Absolute 03/06/2022 0 02     Lymphocytes Absolute 03/06/2022 1 62     Monocytes Absolute 03/06/2022 0 53     Eosinophils Absolute 03/06/2022 0 07     Basophils Absolute 03/06/2022 0 02     Protime 03/06/2022 21 4*    INR 03/06/2022 1 96*    PTT 03/06/2022 31     WBC 03/06/2022 4 32     RBC 03/06/2022 4 76     Hemoglobin 03/06/2022 14 4     Hematocrit 03/06/2022 42 5     MCV 03/06/2022 89     MCH 03/06/2022 30 3     MCHC 03/06/2022 33 9     RDW 03/06/2022 12 5     Platelets 20/44/4350 206     MPV 03/06/2022 9 4     Protime 03/06/2022 20 3*    INR 03/06/2022 1 83*    PTT 03/06/2022 101*    PTT 03/07/2022 113*    Protime 03/07/2022 19 8*    INR 03/07/2022 1 77*    WBC 03/07/2022 7 20     RBC 03/07/2022 4 43     Hemoglobin 03/07/2022 13 4     Hematocrit 03/07/2022 41 1     MCV 03/07/2022 93     MCH 03/07/2022 30 2     MCHC 03/07/2022 32 6     RDW 03/07/2022 12 6     MPV 03/07/2022 10 5     Platelets 48/10/7658 220     nRBC 03/07/2022 0     Neutrophils Relative 03/07/2022 82*    Immat GRANS % 03/07/2022 0     Lymphocytes Relative 03/07/2022 13*    Monocytes Relative 03/07/2022 5     Eosinophils Relative 03/07/2022 0     Basophils Relative 03/07/2022 0     Neutrophils Absolute 03/07/2022 5 87     Immature Grans Absolute 03/07/2022 0 03     Lymphocytes Absolute 03/07/2022 0 93     Monocytes Absolute 03/07/2022 0 37     Eosinophils Absolute 03/07/2022 0 00     Basophils Absolute 03/07/2022 0 00     Sodium 03/07/2022 137     Potassium 03/07/2022 3 6     Chloride 03/07/2022 106     CO2 03/07/2022 24     ANION GAP 03/07/2022 7     BUN 03/07/2022 13     Creatinine 03/07/2022 0 68     Glucose 03/07/2022 148*    Calcium 03/07/2022 9 3     AST 03/07/2022 16     ALT 03/07/2022 18     Alkaline Phosphatase 03/07/2022 62     Total Protein 03/07/2022 7 4     Albumin 03/07/2022 3 5     Total Bilirubin 03/07/2022 0 63     eGFR 03/07/2022 96     PTT 03/07/2022 58*    PTT 03/07/2022 >210*    PTT 03/08/2022 49*    WBC 03/08/2022 9 58     RBC 03/08/2022 4 27     Hemoglobin 03/08/2022 13 1     Hematocrit 03/08/2022 36 9     MCV 03/08/2022 89     MCH 03/08/2022 30 7     MCHC 03/08/2022 35 5     RDW 03/08/2022 12 7     MPV 03/08/2022 9 8     Platelets 17/25/7871 215     nRBC 03/08/2022 0     Neutrophils Relative 03/08/2022 87*    Immat GRANS % 03/08/2022 1     Lymphocytes Relative 03/08/2022 10*    Monocytes Relative 03/08/2022 2*    Eosinophils Relative 03/08/2022 0     Basophils Relative 03/08/2022 0     Neutrophils Absolute 03/08/2022 8 36*    Immature Grans Absolute 03/08/2022 0 06     Lymphocytes Absolute 03/08/2022 0 93     Monocytes Absolute 03/08/2022 0 23     Eosinophils Absolute 03/08/2022 0 00     Basophils Absolute 03/08/2022 0 00     Sodium 03/08/2022 137     Potassium 03/08/2022 3 8     Chloride 03/08/2022 107     CO2 03/08/2022 26     ANION GAP 03/08/2022 4     BUN 03/08/2022 13     Creatinine 03/08/2022 0 82     Glucose 03/08/2022 156*    Calcium 03/08/2022 9 4     Corrected Calcium 03/08/2022 9 9     AST 03/08/2022 16     ALT 03/08/2022 18     Alkaline Phosphatase 03/08/2022 57     Total Protein 03/08/2022 7 1     Albumin 03/08/2022 3 4*    Total Bilirubin 03/08/2022 0 58     eGFR 03/08/2022 88     PTT 03/08/2022 57*    POC Glucose 03/08/2022 188*    PTT 03/08/2022 69*    POC Glucose 03/08/2022 139     PTT 03/09/2022 76*    Protime 03/09/2022 15 2*    INR 03/09/2022 1 25*    POC Glucose 03/09/2022 154*    POC Glucose 03/09/2022 128     POC Glucose 03/09/2022 143*    Sodium 03/10/2022 137     Potassium 03/10/2022 3 8     Chloride 03/10/2022 104     CO2 03/10/2022 30     ANION GAP 03/10/2022 3*    BUN 03/10/2022 17     Creatinine 03/10/2022 0 87     Glucose 03/10/2022 160*    Calcium 03/10/2022 9 5     eGFR 03/10/2022 86     Magnesium 03/10/2022 2 0     TSH 3RD GENERATON 03/10/2022 0 638     WBC 03/10/2022 9 40     RBC 03/10/2022 4 53     Hemoglobin 03/10/2022 13 9     Hematocrit 03/10/2022 39 7     MCV 03/10/2022 88     MCH 03/10/2022 30 7     MCHC 03/10/2022 35 0     RDW 03/10/2022 12 7     Platelets 11/25/0801 237     MPV 03/10/2022 10 5     Ventricular Rate 03/10/2022 45     Atrial Rate 03/10/2022 45     RI Interval 03/10/2022 170     QRSD Interval 03/10/2022 190     QT Interval 03/10/2022 546     QTC Interval 03/10/2022 472     P Axis 03/10/2022 32     QRS Axis 03/10/2022 92     T Wave Axis 03/10/2022 -27     Ventricular Rate 03/10/2022 45     Atrial Rate 03/10/2022 45     PA Interval 03/10/2022 188     QRSD Interval 03/10/2022 194     QT Interval 03/10/2022 556     QTC Interval 03/10/2022 480     P Axis 03/10/2022 29     QRS Axis 03/10/2022 96     T Wave Axis 03/10/2022 -25        CTA head and neck with and without contrast    Result Date: 3/4/2022  Narrative: CTA NECK AND BRAIN WITH AND WITHOUT CONTRAST INDICATION: unsteady, right leg drift 4/5 strength x 3 weeks COMPARISON:   None  TECHNIQUE:  Routine CT imaging of the Brain without contrast   Post contrast imaging was performed after administration of iodinated contrast through the neck and brain  Post contrast axial 0 625 mm images timed to opacify the arterial system  3D rendering was performed on an independent workstation  MIP reconstructions performed  Coronal reconstructions were performed of the noncontrast portion of the brain  Radiation dose length product (DLP) for this visit:  [phone] mGy-cm   This examination, like all CT scans performed in the [facility], was performed utilizing techniques to minimize radiation dose exposure, including the use of iterative reconstruction and automated exposure control  IV Contrast:  85 mL of iohexol (OMNIPAQUE)  IMAGE QUALITY:   Diagnostic FINDINGS: NONCONTRAST BRAIN PARENCHYMA:  No intracranial mass, mass effect or midline shift  No CT signs of acute infarction  No acute parenchymal hemorrhage  VENTRICLES AND EXTRA-AXIAL SPACES:  Normal for the patient's age  VISUALIZED ORBITS AND PARANASAL SINUSES:  Unremarkable  CERVICAL VASCULATURE AORTIC ARCH AND GREAT VESSELS:  Normal aortic arch and great vessel origins  Normal visualized subclavian vessels  RIGHT VERTEBRAL ARTERY CERVICAL SEGMENT:  Moderate stenosis at the origin  The vessel is normal in caliber throughout the neck  LEFT VERTEBRAL ARTERY CERVICAL SEGMENT:  Normal origin   The vessel is normal in caliber throughout the neck  RIGHT EXTRACRANIAL CAROTID SEGMENT:  Mild atherosclerotic disease of the distal common carotid artery and proximal cervical internal carotid artery without significant stenosis compared to the more distal ICA  LEFT EXTRACRANIAL CAROTID SEGMENT:  Mild atherosclerotic disease of the distal common carotid artery and proximal cervical internal carotid artery without significant stenosis compared to the more distal ICA  NASCET criteria was used to determine the degree of internal carotid artery diameter stenosis  INTRACRANIAL VASCULATURE INTERNAL CAROTID ARTERIES:  Normal enhancement of the intracranial portions of the internal carotid arteries  Normal ophthalmic artery origins  Normal ICA terminus  ANTERIOR CIRCULATION:  Symmetric A1 segments and anterior cerebral arteries with normal enhancement  Normal anterior communicating artery  MIDDLE CEREBRAL ARTERY CIRCULATION:  M1 segment and middle cerebral artery branches demonstrate normal enhancement bilaterally  DISTAL VERTEBRAL ARTERIES:  Normal distal vertebral arteries  Posterior inferior cerebellar artery origins are normal  Normal vertebral basilar junction  BASILAR ARTERY:  Basilar artery is normal in caliber  Normal superior cerebellar arteries  POSTERIOR CEREBRAL ARTERIES: The right posterior cerebral artery arises from the basilar tip  There is fetal origin of the left posterior cerebral artery  Both demonstrate no focal stenosis  Normal posterior communicating arteries  VENOUS STRUCTURES:  Normal  NON VASCULAR ANATOMY BONY STRUCTURES:  No acute osseous abnormality  SOFT TISSUES OF THE NECK:  Normal  THORACIC INLET:  Unremarkable  Impression: 1  No evidence of acute intracranial hemorrhage  2  No evidence of occlusive disease  Workstation performed: [id number]     XR chest portable    Result Date: 3/4/2022  Narrative: CHEST INDICATION:   Chest Pain   COMPARISON:  1/23/2015 EXAM PERFORMED/VIEWS:  XR CHEST PORTABLE FINDINGS: Cardiomediastinal silhouette appears unremarkable  The lungs are clear  No pneumothorax or pleural effusion  Osseous structures appear within normal limits for patient age  Impression: No acute cardiopulmonary disease  Workstation performed: [id number]     XR knee 4+ vw left injury    Result Date: 2/26/2022  Narrative: LEFT KNEE INDICATION:   M17 12: Unilateral primary osteoarthritis, left knee  COMPARISON:  Left knee x-ray 6/5/2020 VIEWS:  XR KNEE 4+ VW LEFT INJURY Images: 6 FINDINGS: There is no acute fracture or dislocation  There is no joint effusion  Tricompartmental osteoarthritis with severe narrowing of the medial tibiofemoral joint space and osteophytes seen in all 3 compartments  There is mild genu varus  No lytic or blastic osseous lesion  There are atherosclerotic calcifications  Soft tissues are otherwise unremarkable  Impression: Severe degenerative changes of left knee  Workstation performed: [id number]     XR knee 4+ vw right injury    Result Date: 2/26/2022  Narrative: RIGHT KNEE INDICATION:   M17 11: Unilateral primary osteoarthritis, right knee  COMPARISON:  Right knee x-ray 6/5/2020 VIEWS:  XR KNEE 4+ VW RIGHT INJURY Images: 5 FINDINGS: There is no acute fracture or dislocation  There is no joint effusion  Tricompartmental osteoarthritis with severe narrowing of the lateral tibiofemoral joint space and osteophytes seen in all 3 compartments  There is mild genu valgus  No lytic or blastic osseous lesion  There are atherosclerotic calcifications  Soft tissues are otherwise unremarkable  Impression: Severe degenerative changes of right knee  Workstation performed: [id number]     MRI brain wo contrast    Result Date: 3/5/2022  Narrative: MRI BRAIN WITHOUT CONTRAST INDICATION: Rule out CVA  COMPARISON: CTA performed yesterday, MRI dated 3/27/2021  TECHNIQUE:  Sagittal T1, axial T2, axial FLAIR, axial T1, axial Baxter and axial diffusion imaging   IMAGE QUALITY: Diagnostic  FINDINGS: BRAIN PARENCHYMA:  There is no discrete mass, mass effect or midline shift  There is no intracranial hemorrhage  There is no evidence of acute infarction and diffusion imaging is unremarkable  Small scattered hyperintensities on T2/FLAIR imaging are noted in the periventricular and subcortical white matter demonstrating an appearance that is statistically most likely to represent mild microangiopathic change  VENTRICLES:  Mild volume loss  No hydrocephalus  SELLA AND PITUITARY GLAND:  Normal  ORBITS:  Normal  PARANASAL SINUSES: Minimal ethmoid mucosal thickening  Minimal mastoid opacification is improved compared with the previous MRI  VASCULATURE:  Evaluation of the major intracranial vasculature demonstrates appropriate flow voids  CALVARIUM AND SKULL BASE:  Normal  EXTRACRANIAL SOFT TISSUES:  Normal      Impression: No acute intracranial pathology  Mild chronic microangiopathy  Workstation performed: [id number]     MRI cervical spine w wo contrast    Result Date: 3/5/2022  Narrative: MRI CERVICAL SPINE WITH AND WITHOUT CONTRAST INDICATION: CT findings, weakness  COMPARISON:  CT dated 3/4/2022 and MRI dated 3/15/2013  TECHNIQUE:  Sagittal T1, sagittal T2, sagittal inversion recovery, axial 2D merge and axial T2  Sagittal T1 and axial T1 postcontrast   IV Contrast:  11 mL of Gadobutrol injection (SINGLE-DOSE) IMAGE QUALITY:  Motion artifact, worse on axial imaging  FINDINGS: ALIGNMENT:  Degenerative grade 1 anterolisthesis at C5-C6 is unchanged  Minimal degenerative anterolisthesis at C4-5 is less apparent  Alignment is otherwise normal  MARROW SIGNAL: Multilevel endplate degenerative changes most pronounced at C5-6 with mixed Modic type I and type II changes  No suspicious marrow signal abnormality  CERVICAL AND VISUALIZED UPPER THORACIC CORD:  Mild increased signal within the cord at C5-6 is suggested on sagittal images that may represent myelomalacia rather than edema     This cannot be confirmed on motion limited axial images  Cord is otherwise normal in signal intensity  PREVERTEBRAL AND PARASPINAL SOFT TISSUES:  Normal  VISUALIZED POSTERIOR FOSSA:  The visualized posterior fossa demonstrates no abnormal signal  CERVICAL DISC SPACES:  Severe space narrowing at C5-6 and C6-7 is stable compared with CT but progressed from previous MRI  C2-C3:  Disc complex with facet and uncovertebral hypertrophy  Mild canal stenosis  Bilateral foraminal stenosis, mild on the right and moderate on the left  C3-C4:  Disc osteophyte complex with facet, uncovertebral and ligamentum flavum hypertrophy  Moderate canal stenosis  Slight flattening of the cord  Severe bilateral foraminal stenosis  C4-C5:  Small disc osteophyte complex, facet and uncovertebral hypertrophy  Mild to moderate canal stenosis  Bilateral foraminal stenosis  C5-C6: Limited evaluation on axial imaging  Discogenic complex with facet, uncovertebral and ligamentum flavum hypertrophy  Severe canal stenosis with flattening the cord has progressed since 2013  Bilateral foraminal stenosis  C6-C7:  Disc osteophyte complex with facet and uncovertebral hypertrophy  Mild canal stenosis  Bilateral foraminal stenosis right worse than left  C7-T1:  No disc herniation or canal stenosis  Facet arthropathy with at least mild foraminal stenosis  UPPER THORACIC DISC SPACES:  Small disc osteophyte complexes that mildly indent the thecal sac without significant canal stenosis  Facet arthropathy with foraminal stenosis most pronounced at T1-T2  POSTCONTRAST IMAGING:  Normal      Impression: No acute abnormality  Motion limited    Multilevel degenerative spondylosis progressed from 2013 most severe at C5-6 with severe canal stenosis and flattening of the cord  Small focus of T2 signal within the cord at C5-C6 may represent myelomalacia  Moderate canal stenosis at C3-4 mildly indents the cord without signal abnormality  Marked multilevel foraminal stenosis   No prevertebral edema or mass  Workstation performed: [id number]     XR bone length (scanogram)    Result Date: 2/26/2022  Narrative: LEG LENGTH STUDY INDICATION:   M17 11: Unilateral primary osteoarthritis, right knee  COMPARISON:  None VIEWS:  XR BONE LENGTH (SCANOGRAM) Images: 7 FINDINGS: No fractures or pathologic lesions  Degenerative changes of bilateral knees  Bone length measurements are as follows: Right femur from femoral head to medial femoral condyle = 50 6 cm Left femur from femoral head to medial femoral condyle = 51 0 cm Right tibia from medial tibial plateau to the tibial plafond = 36 9 cm Left tibia from medial tibial plateau to the tibial plafond = 36 6 cm Total right leg length (including knee joint space) from femoral head to tibial plafond = 87 5 cm Total left leg length (including knee joint space) from femoral head to tibial plafond = 87 8 cm     Impression: Leg length measurements as above  Workstation performed: [id number]     CT recon only cervical spine (No Charge)    Result Date: 3/4/2022  Narrative: CT CERVICAL SPINE - WITHOUT CONTRAST INDICATION:   Neck pain, acute, no red flags na RUE numbness/tingling, neck pain  COMPARISON:  None  TECHNIQUE:  CT examination of the cervical spine was performed without intravenous contrast   Contiguous axial images were obtained  Sagittal and coronal reconstructions were performed  Radiation dose length product (DLP) for this visit:  0 mGy-cm   This examination, like all CT scans performed in the [address] [place] [address], was performed utilizing techniques to minimize radiation dose exposure, including the use of iterative reconstruction and automated exposure control  IMAGE QUALITY:  Diagnostic  FINDINGS: ALIGNMENT:  Grade 1 anterolisthesis of C4 on C5 possibly from degenerative facet arthrosis VERTEBRAL BODIES:  No fracture  Suspect asymmetric soft tissue in the right paraspinal region  Correlate with contrast-enhanced cervical spine MRI   DEGENERATIVE CHANGES:  Multilevel facet severe arthritic changes causing multilevel neural foraminal narrowing at nearly all levels  Disc osteophyte complex and posterior endplate osteophytes causing spinal canal stenosis at C5-C6 and C6-C7  Complete loss of the disc space at C5-C6 and C6-C7 where there is endplate erosive changes most likely degenerative in the absence of suspected infection  THORACIC INLET:  Normal      Impression: Multilevel degenerative changes causing multilevel neural foraminal and spinal canal stenosis as described  Suspect asymmetric soft tissue in the right paraspinal region  Correlate with contrast-enhanced cervical spine MRI recommended to exclude paraspinal soft tissue infection  Workstation performed: [id number]     Echo follow up/limited w/ contrast if indicated    Result Date: 3/7/2022  Narrative: [employer]  Left Ventricle: Left ventricular cavity size is normal  Wall thickness is normal  The left ventricular ejection fraction is 60%  Systolic function is normal  Wall motion is normal  Diastolic function is moderately abnormal, consistent with grade II (pseudonormal) relaxation    Left Atrium: The atrium is moderately dilated    Right Atrium: The atrium is mildly dilated    Aortic Valve: The aortic valve is trileaflet  The leaflets are moderately calcified  There is moderately reduced mobility  There is trace regurgitation  There is moderate stenosis  Mean gradiet 17 mmHg, EDELMIRA 1 3 cm2    Mitral Valve: There is mild regurgitation    Tricuspid Valve: There is mild regurgitation  EKG personally reviewed by Savannah Hill MD      Counseling / Coordination of Care  Total floor / unit time spent today 30 minutes  Greater than 50% of total time was spent with the patient and / or family counseling and / or coordination of care

## 2022-03-10 NOTE — PROGRESS NOTES
[address] [facility]  Progress Note - Kirsten [facility] 1950, 71 y o  male MRN: [id number]  Unit/Bed#: MS Mandel8-01 Encounter: [id number]  Primary Care Provider: Jacqueline Palmer MD   Date and time admitted to hospital: 3/5/2022  8:18 PM    * Cervical myelopathy [facility])  Assessment & Plan  Severe canal stenosis C5-6 with small area of signal abnormality   Presented with significant decline in ambulation along with numbness and weakness R>L over the last week  Reports multiple falls  Imaging:    MRI cervical spine with without 3/5/2022: Motion limited  Multilevel degenerative spondylosis progressed from 2013 with most severe canal stenosis noted at C5-6 with flattening of the cord and small focus of T2 signal abnormality likely representing myelomalacia  Moderate canal stenosis at C3-4 which mildly indents the cord without signal abnormality  Plan:   Continue monitor neurological exam and symptoms   Discussed etiology of patient's symptoms correlating with cervical stenosis   Patient on a trial of steroids  Currently on Decadron 4mg q 6hr  Pt reports noticing some improvement with steroids   Discussed with patient the need for surgical intervention for Cervical decompressive surgery  Procedure, along with risks and benefits discussed  Pt is amenable to surgical intervention  Also discussed with patient's wife about surgery via phone  Pt's wife stated she would be available tomorrow via phone for information about surgery   Appreciate the input of cardiology and medicine for surgical clearance  Pt cleared by Cardiology and Medicine for surgery   Given 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, prior anticoagulation (Coumadin) is currently on hold  Deferred reversing secondary to clotting disorder  3/9/22  INR at 1 25  Pt is on Heparin gtt   Repeat labs ordered for tomorrow AM  NPO after midnight   Heparin gtt to be stopped at midnight- Discussed this with RN   Tentative plan is for surgery tomorrow 3/11/22 by Dr Deepti Winston for Cervical 360: Anterior and posterior decompression and fusion  [facility] Neurosurgery will continue to follow  Call with any questions/concerns  Subjective/Objective     Chief Complaint: "I feel the same"    Subjective: Pt reports he feels the same  He reports he continues to have mild numbness and weakness on the right than the left  Reports his sx have improved since he was started on Decadron  Pt denies neck pain  Pt denies chest pain, SOB, abdominal pain  He reports he continues to have gait instability  Denies falling since admission  Reports he has been using a walker to stand up and calls for assistance to get to the bathroom  Objective: Alert and awake, No acute distress  I/O       03/08 0701 03/09 0700 03/09 0701  03/10 0700 03/10 0701 03/11 0700    P  O  730      I V  3728 8      Total Intake 4458 8      Urine 2650 2200 1350    Stool 0      Total Output 2650 2200 1350    Net +1808 8 -2200 -1350           Unmeasured Urine Occurrence 1 x      Unmeasured Stool Occurrence 1 x            Invasive Devices  Report    Peripheral Intravenous Line            Peripheral IV 03/08/22 Left Forearm 2 days                Physical Exam:  Vitals: Blood pressure 145/75, pulse (!) 52, temperature 97 7 °F (36 5 °C), resp  rate 18, SpO2 95 %  ,There is no height or weight on file to calculate BMI  General appearance:  Appears stated age  Head: Normocephalic, without obvious abnormality, atraumatic  Eyes: EOMI, PERRL  Neck: supple, symmetrical, trachea midline   Lungs: non labored breathing  Heart: Bradycardia  Neurologic:   Mental status: Alert, oriented, thought content appropriate  Cranial nerves: grossly intact (Cranial nerves II-XII)  Sensory: intact to LT X 4 but right UE slightly decreased compared to left  Motor: moving all extremities, strength RUE/RLE 4+-5/5, LUE/LLE 5/5  Reflexes:Right irizarry's, bilateral clonus  Lab Results:  Results from last 7 days   Lab Units 03/10/22  0516 03/08/22  0535 03/07/22  0512 03/06/22  1326 03/06/22  0448   WBC Thousand/uL 9 40 9 58 7 20   < > 4 29*   HEMOGLOBIN g/dL 13 9 13 1 13 4   < > 14 0   HEMATOCRIT % 39 7 36 9 41 1   < > 40 2   PLATELETS Thousands/uL 237 215 220   < > 191   NEUTROS PCT %  --  87* 82*  --  46   MONOS PCT %  --  2* 5  --  12    < > = values in this interval not displayed  Results from last 7 days   Lab Units 03/10/22  0232 03/08/22  0535 03/07/22  0512 03/05/22  0414 03/04/22  1454   POTASSIUM mmol/L 3 8 3 8 3 6   < > 4 2   CHLORIDE mmol/L 104 107 106   < > 101   CO2 mmol/L 30 26 24   < > 31   BUN mg/dL 17 13 13   < > 11   CREATININE mg/dL 0 87 0 82 0 68   < > 0 76   CALCIUM mg/dL 9 5 9 4 9 3   < > 9 2   ALK PHOS U/L  --  57 62  --  69   ALT U/L  --  18 18  --  24   AST U/L  --  16 16  --  20    < > = values in this interval not displayed  Results from last 7 days   Lab Units 03/10/22  0232   MAGNESIUM mg/dL 2 0         Results from last 7 days   Lab Units 03/10/22  0948 03/10/22  0516 03/09/22  0438 03/09/22  0425 03/07/22  1149 03/07/22  0512   INR   --  1 22* 1 25*  --   --  1 77*   PTT seconds 147* 157*  --  76*   < >  --     < > = values in this interval not displayed  Imaging Studies: I have personally reviewed pertinent reports and I have personally reviewed pertinent films in PACS    EKG, Pathology, and Other Studies: I have personally reviewed pertinent reports  VTE Pharmacologic Prophylaxis: Sequential compression device (Venodyne)  and Heparin    VTE Mechanical Prophylaxis: sequential compression device    PLEASE NOTE:  This encounter may have been completed utilizing the [employer]Fluency Direct Speech Voice Recognition Software  Grammatical errors, random word insertions, pronoun errors and incomplete sentences are occasional consequences of the system due to software limitations, ambient noise and hardware issues  These may be missed by proof reading prior to affixing electronic signature  Any questions or concerns about the content, text or information contained within the body of this dictation should be directly addressed to the advanced practitioner or physician for clarification

## 2022-03-10 NOTE — PROGRESS NOTES
[facility]  Progress Note - Ary Adrian 1950, 71 y o  male MRN: [id number]  Unit/Bed#: -01 Encounter: [id number]  Primary Care Provider: Leonor Nichols MD   Date and time admitted to hospital: 3/5/2022  8:18 PM    * [facility]  Assessment & Plan  · Patient presented with bilateral lower extremity weakness and right-sided neck discomfort right arm numbness tingling x 3 weeks  · According to transferring notes   · " Discussed with neurosurgery Dr Mindy Villalba; recommending transfer to [facility] under [facility] with neurosurgery consult for surgical intervention  Recommended holding coumadin for now  Surgical intervention timing TBD  Trend INR daily "  · heparin gtt  · Goal INR per neurosugery, <1 4  · IV steroids per neurosurgery  · Cardiology clearance obtained  · Acceptable risk from medical standpoint to proceed to OR for anterior cervical diskectomy   · NPO after midnight preop labs ordered for tentative neurosurgical intervention tomorrow      Ambulatory dysfunction  Assessment & Plan  · Management as above  · PT OT evaluate -discussed with neurosurgery okay for cautious mobilizing with PT    · Defer PT recs and mobility recs to neurosurgery       Paroxysmal A-fib ([facility] )  Assessment & Plan  · Continue heart rate control medications  · Hold warfarin as per Neurosurgery recommendation  · Cardiology following  · On heparin gtt  · Daily INR      Sinus bradycardia  Assessment & Plan  · Sinus bradycardia on EKG  · Patient asymptomatic  · Cardiology following    Mixed hyperlipidemia  Assessment & Plan  · Continue statin    Factor V Leiden mutation ([facility] )  Assessment & Plan  · On Coumadin outpatient  · On heparin drip while inpatient due to the tentative neuro surgical intervention and to get up INR in range for procedure    WILL (obstructive sleep apnea)  Assessment & Plan  · Continue CPAP at night      Essential hypertension  Assessment & Plan  · Continue home blood pressure meds with holding parameters          VTE Pharmacologic Prophylaxis: VTE Score: 3 Moderate Risk (Score 3-4) - Pharmacological DVT Prophylaxis Ordered: heparin  Patient Centered Rounds: I performed bedside rounds with nursing staff today  Discussions with Specialists or Other Care Team Provider: discussed with neurosurgery and let them know that they can freely change steroids as needed  Education and Discussions with Family / Patient: called wife King Jose   Time Spent for Care: 30 minutes  More than 50% of total time spent on counseling and coordination of care as described above  Current Length of Stay: 5 day(s)  Current Patient Status: Inpatient   Certification Statement: The patient will continue to require additional inpatient hospital stay due to IV steroids and surgical intervention   Discharge Plan: as per neurosurgery     Code Status: Level 3 - DNAR and DNI    Subjective:   Seen for follow up for right sided weakness  Patient states that he has improved strength on the right upper extremity and lower extremity  Patient also states that control of his urination has improved is able to hold a make it to the bathroom    Objective:     Vitals:   Temp (24hrs), Av 7 °F (36 5 °C), Min:97 5 °F (36 4 °C), Max:97 9 °F (36 6 °C)    Temp:  [97 5 °F (36 4 °C)-97 9 °F (36 6 °C)] 97 7 °F (36 5 °C)  HR:  [45-52] 52  Resp:  [17-18] 18  BP: (145-169)/(75-90) 145/75  SpO2:  [94 %-97 %] 95 %  There is no height or weight on file to calculate BMI  Input and Output Summary (last 24 hours): Intake/Output Summary (Last 24 hours) at 3/10/2022 1106  Last data filed at 3/10/2022 1033  Gross per 24 hour   Intake --   Output 2900 ml   Net -2900 ml       Physical Exam:   Physical Exam  Constitutional:       General: He is not in acute distress  Appearance: He is not ill-appearing or toxic-appearing  HENT:      Head: Normocephalic        Nose: Nose normal       Mouth/Throat:      Mouth: Mucous membranes are moist    Eyes:      General: No scleral icterus  Right eye: No discharge  Left eye: No discharge  Pupils: Pupils are equal, round, and reactive to light  Cardiovascular:      Rate and Rhythm: Normal rate  Pulmonary:      Effort: Pulmonary effort is normal  No respiratory distress  Breath sounds: No stridor  No wheezing, rhonchi or rales  Chest:      Chest wall: No tenderness  Abdominal:      General: Abdomen is flat  There is no distension  Palpations: There is no mass  Tenderness: There is no abdominal tenderness  There is no left CVA tenderness, guarding or rebound  Hernia: No hernia is present  Musculoskeletal:         General: No swelling, tenderness, deformity or signs of injury  Right lower leg: No edema  Left lower leg: No edema  Skin:     Capillary Refill: Capillary refill takes less than 2 seconds  Coloration: Skin is not jaundiced or pale  Findings: No bruising, erythema, lesion or rash  Neurological:      Mental Status: He is alert  Cranial Nerves: No cranial nerve deficit  Sensory: No sensory deficit  Motor: No weakness  Gait: Gait normal       Deep Tendon Reflexes: Reflexes normal       Comments: RLE weakness and hand weakness      Psychiatric:         Mood and Affect: Mood normal           Additional Data:     Labs:  Results from last 7 days   Lab Units 03/10/22  0516 03/08/22  0535 03/08/22  0535   WBC Thousand/uL 9 40   < > 9 58   HEMOGLOBIN g/dL 13 9   < > 13 1   HEMATOCRIT % 39 7   < > 36 9   PLATELETS Thousands/uL 237   < > 215   NEUTROS PCT %  --   --  87*   LYMPHS PCT %  --   --  10*   MONOS PCT %  --   --  2*   EOS PCT %  --   --  0    < > = values in this interval not displayed       Results from last 7 days   Lab Units 03/10/22  0232 03/08/22  0535 03/08/22  0535   SODIUM mmol/L 137   < > 137   POTASSIUM mmol/L 3 8   < > 3 8   CHLORIDE mmol/L 104   < > 107   CO2 mmol/L 30   < > 26   BUN mg/dL 17   < > 13   CREATININE mg/dL 0 87   < > 0 82   ANION GAP mmol/L 3*   < > 4   CALCIUM mg/dL 9 5   < > 9 4   ALBUMIN g/dL  --   --  3 4*   TOTAL BILIRUBIN mg/dL  --   --  0 58   ALK PHOS U/L  --   --  57   ALT U/L  --   --  18   AST U/L  --   --  16   GLUCOSE RANDOM mg/dL 160*   < > 156*    < > = values in this interval not displayed  Results from last 7 days   Lab Units 03/09/22  0438   INR  1 25*     Results from last 7 days   Lab Units 03/09/22  2220 03/09/22  1401 03/09/22  0932 03/08/22  2110 03/08/22  1812 03/04/22  1520   POC GLUCOSE mg/dl 143* 128 154* 139 188* 84     Results from last 7 days   Lab Units 03/05/22  0414   HEMOGLOBIN A1C % 6 3*           Lines/Drains:  Invasive Devices  Report    Peripheral Intravenous Line            Peripheral IV 03/08/22 Left Forearm 2 days                      Imaging: No pertinent imaging reviewed      Recent Cultures (last 7 days):         Last 24 Hours Medication List:   Current Facility-Administered Medications   Medication Dose Route Frequency Provider Last Rate    acetaminophen  650 mg Oral Q4H PRN Jaclyn Vega MD      amLODIPine  5 mg Oral Daily Zaki Soria MD      atorvastatin  40 mg Oral QPM Jaclyn Vega MD      dexamethasone  4 mg Intravenous Q6H [address] Mira Puentes PA-C      DULoxetine  60 mg Oral HS Jaclyn Vega MD      flecainide  100 mg Oral BID Jaclyn Vega MD      heparin (porcine)  3-30 Units/kg/hr (Order-Specific) Intravenous Titrated Noemí Avnedano MD Stopped (03/10/22 1033)    insulin lispro  1-5 Units Subcutaneous HS Melody Valero MD      insulin lispro  2-12 Units Subcutaneous 4 times day Melody Valero MD      metoprolol succinate  100 mg Oral Daily Jaclyn Vega MD      pantoprazole  40 mg Oral Early Morning Melody Valero MD          Today, Patient Was Seen By: Melody Valero MD    **Please Note: This note may have been constructed using a voice recognition system  ** 22.5

## 2022-03-10 NOTE — PLAN OF CARE
Problem: MOBILITY - ADULT  Goal: Maintain or return to baseline ADL function  Description: INTERVENTIONS:  -  Assess patient's ability to carry out ADLs; assess patient's baseline for ADL function and identify physical deficits which impact ability to perform ADLs (bathing, care of mouth/teeth, toileting, grooming, dressing, etc )  - Assess/evaluate cause of self-care deficits   - Assess range of motion  - Assess patient's mobility; develop plan if impaired  - Assess patient's need for assistive devices and provide as appropriate  - Encourage maximum independence but intervene and supervise when necessary  - Involve family in performance of ADLs  - Assess for home care needs following discharge   - Consider OT consult to assist with ADL evaluation and planning for discharge  - Provide patient education as appropriate  Outcome: Progressing  Goal: Maintains/Returns to pre admission functional level  Description: INTERVENTIONS:  - Perform BMAT or MOVE assessment daily    - Set and communicate daily mobility goal to care team and patient/family/caregiver  - Collaborate with rehabilitation services on mobility goals if consulted  - Perform Range of Motion 3 times a day  - Reposition patient every 2 hours    - Dangle patient 3 times a day  - Stand patient 3 times a day  - Ambulate patient 3 times a day  - Out of bed to chair 3 times a day   - Out of bed for meals 3  Problem: PAIN - ADULT  Goal: Verbalizes/displays adequate comfort level or baseline comfort level  Description: Interventions:  - Encourage patient to monitor pain and request assistance  - Assess pain using appropriate pain scale  - Administer analgesics based on type and severity of pain and evaluate response  - Implement non-pharmacological measures as appropriate and evaluate response  - Consider cultural and social influences on pain and pain management  - Notify physician/advanced practitioner if interventions unsuccessful or patient reports new pain  Outcome: Progressing     Problem: INFECTION - ADULT  Goal: Absence or prevention of progression during hospitalization  Description: INTERVENTIONS:  - Assess and monitor for signs and symptoms of infection  - Monitor lab/diagnostic results  - Monitor all insertion sites, i e  indwelling lines, tubes, and drains  - Monitor endotracheal if appropriate and nasal secretions for changes in amount and color  - [place] appropriate cooling/warming therapies per order  - Administer medications as ordered  - Instruct and encourage patient and family to use good hand hygiene technique  - Identify and instruct in appropriate isolation precautions for identified infection/condition  Outcome: Progressing  Goal: Absence of fever/infection during neutropenic period  Description: INTERVENTIONS:  - Monitor WBC    Outcome: Progressing     Problem: SAFETY ADULT  Goal: Maintain or return to baseline ADL function  Description: INTERVENTIONS:  -  Assess patient's ability to carry out ADLs; assess patient's baseline for ADL function and identify physical deficits which impact ability to perform ADLs (bathing, care of mouth/teeth, toileting, grooming, dressing, etc )  - Assess/evaluate cause of self-care deficits   - Assess range of motion  - Assess patient's mobility; develop plan if impaired  - Assess patient's need for assistive devices and provide as appropriate  - Encourage maximum independence but intervene and supervise when necessary  - Involve family in performance of ADLs  - Assess for home care needs following discharge   - Consider OT consult to assist with ADL evaluation and planning for discharge  - Provide patient education as appropriate  Outcome: Progressing  Goal: Maintains/Returns to pre admission functional level  Description: INTERVENTIONS:  - Perform BMAT or MOVE assessment daily    - Set and communicate daily mobility goal to care team and patient/family/caregiver     - Collaborate with rehabilitation services on mobility goals if consulted  - Perform Range of Motion 3 times a day  - Reposition patient every 2 hours    - Dangle patient 3 times a day  - Stand patient 3 times a day  - Ambulate patient 3 times a day  - Out of bed to chair 3 times a day   - Out of bed for meals 3 times a day  - Out of bed for toileting  - Record patient progress and toleration of activity level   Outcome: Progressing  Goal: Patient will remain free of falls  Description: INTERVENTIONS:  - Educate patient/family on patient safety including physical limitations  - Instruct patient to call for assistance with activity   - Consult OT/PT to assist with strengthening/mobility   - Keep Call bell within reach  - Keep bed low and locked with side rails adjusted as appropriate  - Keep care items and personal belongings within reach  - Initiate and maintain comfort rounds  - Make Fall Risk Sign visible to staff  - Offer Toileting every 2 Hours, in advance of need  - Initiate/Maintain bed alarm  - Apply yellow socks and bracelet for high fall risk patients  - Consider moving patient to room near nurses station  Outcome: Progressing    times a day  - Out of bed for toileting  - Record patient progress and toleration of activity level   Outcome: Progressing

## 2022-03-11 ENCOUNTER — ANESTHESIA EVENT (INPATIENT)
Dept: PERIOP | Facility: HOSPITAL | Age: 72
DRG: 472 | End: 2022-03-11
Payer: COMMERCIAL

## 2022-03-11 ENCOUNTER — APPOINTMENT (INPATIENT)
Dept: RADIOLOGY | Facility: HOSPITAL | Age: 72
DRG: 472 | End: 2022-03-11
Payer: COMMERCIAL

## 2022-03-11 ENCOUNTER — ANESTHESIA (INPATIENT)
Dept: PERIOP | Facility: HOSPITAL | Age: 72
DRG: 472 | End: 2022-03-11
Payer: COMMERCIAL

## 2022-03-11 PROBLEM — Z71.89 GOALS OF CARE, COUNSELING/DISCUSSION: Status: ACTIVE | Noted: 2022-03-11

## 2022-03-11 LAB
ABO GROUP BLD: NORMAL
ANION GAP SERPL CALCULATED.3IONS-SCNC: 7 MMOL/L (ref 4–13)
BLD GP AB SCN SERPL QL: NEGATIVE
BUN SERPL-MCNC: 20 MG/DL (ref 5–25)
CALCIUM SERPL-MCNC: 9.7 MG/DL (ref 8.3–10.1)
CHLORIDE SERPL-SCNC: 102 MMOL/L (ref 100–108)
CO2 SERPL-SCNC: 26 MMOL/L (ref 21–32)
CREAT SERPL-MCNC: 0.88 MG/DL (ref 0.6–1.3)
ERYTHROCYTE [DISTWIDTH] IN BLOOD BY AUTOMATED COUNT: 12.3 % (ref 11.6–15.1)
GFR SERPL CREATININE-BSD FRML MDRD: 86 ML/MIN/1.73SQ M
GLUCOSE SERPL-MCNC: 147 MG/DL (ref 65–140)
GLUCOSE SERPL-MCNC: 151 MG/DL (ref 65–140)
GLUCOSE SERPL-MCNC: 194 MG/DL (ref 65–140)
GLUCOSE SERPL-MCNC: 196 MG/DL (ref 65–140)
HCT VFR BLD AUTO: 41.7 % (ref 36.5–49.3)
HGB BLD-MCNC: 14.6 G/DL (ref 12–17)
INR PPP: 1.04 (ref 0.84–1.19)
MCH RBC QN AUTO: 30.2 PG (ref 26.8–34.3)
MCHC RBC AUTO-ENTMCNC: 35 G/DL (ref 31.4–37.4)
MCV RBC AUTO: 86 FL (ref 82–98)
PLATELET # BLD AUTO: 237 THOUSANDS/UL (ref 149–390)
PMV BLD AUTO: 10 FL (ref 8.9–12.7)
POTASSIUM SERPL-SCNC: 3.6 MMOL/L (ref 3.5–5.3)
PROTHROMBIN TIME: 13.2 SECONDS (ref 11.6–14.5)
RBC # BLD AUTO: 4.83 MILLION/UL (ref 3.88–5.62)
RH BLD: POSITIVE
SODIUM SERPL-SCNC: 135 MMOL/L (ref 136–145)
SPECIMEN EXPIRATION DATE: NORMAL
WBC # BLD AUTO: 10.06 THOUSAND/UL (ref 4.31–10.16)

## 2022-03-11 PROCEDURE — 01N80ZZ RELEASE THORACIC NERVE, OPEN APPROACH: ICD-10-PCS | Performed by: NEUROLOGICAL SURGERY

## 2022-03-11 PROCEDURE — 0RG10AJ FUSION OF CERVICAL VERTEBRAL JOINT WITH INTERBODY FUSION DEVICE, POSTERIOR APPROACH, ANTERIOR COLUMN, OPEN APPROACH: ICD-10-PCS | Performed by: NEUROLOGICAL SURGERY

## 2022-03-11 PROCEDURE — 20930 SP BONE ALGRFT MORSEL ADD-ON: CPT | Performed by: NEUROLOGICAL SURGERY

## 2022-03-11 PROCEDURE — 22552 ARTHRD ANT NTRBD CERVICAL EA: CPT | Performed by: NEUROLOGICAL SURGERY

## 2022-03-11 PROCEDURE — C1713 ANCHOR/SCREW BN/BN,TIS/BN: HCPCS | Performed by: NEUROLOGICAL SURGERY

## 2022-03-11 PROCEDURE — NC001 PR NO CHARGE: Performed by: NEUROLOGICAL SURGERY

## 2022-03-11 PROCEDURE — 22614 ARTHRD PST TQ 1NTRSPC EA ADD: CPT | Performed by: NEUROLOGICAL SURGERY

## 2022-03-11 PROCEDURE — 22845 INSERT SPINE FIXATION DEVICE: CPT | Performed by: NEUROLOGICAL SURGERY

## 2022-03-11 PROCEDURE — 01N10ZZ RELEASE CERVICAL NERVE, OPEN APPROACH: ICD-10-PCS | Performed by: NEUROLOGICAL SURGERY

## 2022-03-11 PROCEDURE — C1781 MESH (IMPLANTABLE): HCPCS | Performed by: NEUROLOGICAL SURGERY

## 2022-03-11 PROCEDURE — 20936 SP BONE AGRFT LOCAL ADD-ON: CPT | Performed by: NEUROLOGICAL SURGERY

## 2022-03-11 PROCEDURE — 72040 X-RAY EXAM NECK SPINE 2-3 VW: CPT

## 2022-03-11 PROCEDURE — 22600 ARTHRD PST TQ 1NTRSPC CRV: CPT | Performed by: NEUROLOGICAL SURGERY

## 2022-03-11 PROCEDURE — 22853 INSJ BIOMECHANICAL DEVICE: CPT | Performed by: NEUROLOGICAL SURGERY

## 2022-03-11 PROCEDURE — 86901 BLOOD TYPING SEROLOGIC RH(D): CPT | Performed by: INTERNAL MEDICINE

## 2022-03-11 PROCEDURE — 82948 REAGENT STRIP/BLOOD GLUCOSE: CPT

## 2022-03-11 PROCEDURE — 99232 SBSQ HOSP IP/OBS MODERATE 35: CPT | Performed by: INTERNAL MEDICINE

## 2022-03-11 PROCEDURE — 0RT30ZZ RESECTION OF CERVICAL VERTEBRAL DISC, OPEN APPROACH: ICD-10-PCS | Performed by: NEUROLOGICAL SURGERY

## 2022-03-11 PROCEDURE — 86900 BLOOD TYPING SEROLOGIC ABO: CPT | Performed by: INTERNAL MEDICINE

## 2022-03-11 PROCEDURE — 85027 COMPLETE CBC AUTOMATED: CPT | Performed by: INTERNAL MEDICINE

## 2022-03-11 PROCEDURE — 86850 RBC ANTIBODY SCREEN: CPT | Performed by: INTERNAL MEDICINE

## 2022-03-11 PROCEDURE — 85610 PROTHROMBIN TIME: CPT | Performed by: INTERNAL MEDICINE

## 2022-03-11 PROCEDURE — 22551 ARTHRD ANT NTRBDY CERVICAL: CPT | Performed by: NEUROLOGICAL SURGERY

## 2022-03-11 PROCEDURE — 99233 SBSQ HOSP IP/OBS HIGH 50: CPT | Performed by: INTERNAL MEDICINE

## 2022-03-11 PROCEDURE — 0RG40AJ FUSION OF CERVICOTHORACIC VERTEBRAL JOINT WITH INTERBODY FUSION DEVICE, POSTERIOR APPROACH, ANTERIOR COLUMN, OPEN APPROACH: ICD-10-PCS | Performed by: NEUROLOGICAL SURGERY

## 2022-03-11 PROCEDURE — 63015 REMOVE SPINE LAMINA >2 CRVCL: CPT | Performed by: NEUROLOGICAL SURGERY

## 2022-03-11 PROCEDURE — 22842 INSERT SPINE FIXATION DEVICE: CPT | Performed by: NEUROLOGICAL SURGERY

## 2022-03-11 PROCEDURE — 80048 BASIC METABOLIC PNL TOTAL CA: CPT | Performed by: INTERNAL MEDICINE

## 2022-03-11 DEVICE — MULTI AXIAL SCREW 3603512 3.5 X 12MM
Type: IMPLANTABLE DEVICE | Site: POSTERIOR CERVICAL | Status: FUNCTIONAL
Brand: INFINITY™ OCCIPITOCERVICAL UPPER THORACIC SYSTEM

## 2022-03-11 DEVICE — SCREW 7713513 ZEVO VAR SD 3.5MM X 13MM
Type: IMPLANTABLE DEVICE | Site: SPINE CERVICAL | Status: FUNCTIONAL
Brand: ZEVO™ ANTERIOR CERVICAL PLATE SYSTEM

## 2022-03-11 DEVICE — DBM T45001 1CC PASTE GRAFTON PLUS
Type: IMPLANTABLE DEVICE | Site: SPINE CERVICAL | Status: FUNCTIONAL
Brand: GRAFTON®AND GRAFTON PLUS®DEMINERALIZED BONE MATRIX (DBM)

## 2022-03-11 DEVICE — IMPLANT 6240841 ANATOMIC 14X11X8MM
Type: IMPLANTABLE DEVICE | Site: SPINE CERVICAL | Status: FUNCTIONAL
Brand: VERTE-STACK® SPINAL SYSTEM

## 2022-03-11 DEVICE — DBM T42200 2.5CMX10CM 2 EACH GRAFTON MAT
Type: IMPLANTABLE DEVICE | Site: POSTERIOR CERVICAL | Status: FUNCTIONAL
Brand: GRAFTON®AND GRAFTON PLUS®DEMINERALIZED BONE MATRIX (DBM)

## 2022-03-11 DEVICE — ROD 7753795 PRE-CUT 3.5MM X 95MM
Type: IMPLANTABLE DEVICE | Site: POSTERIOR CERVICAL | Status: FUNCTIONAL
Brand: VERTEX® RECONSTRUCTION SYSTEM

## 2022-03-11 RX ORDER — PROPOFOL 10 MG/ML
INJECTION, EMULSION INTRAVENOUS AS NEEDED
Status: DISCONTINUED | OUTPATIENT
Start: 2022-03-11 | End: 2022-03-11

## 2022-03-11 RX ORDER — OXYCODONE HYDROCHLORIDE 5 MG/1
5 TABLET ORAL EVERY 4 HOURS PRN
Status: DISCONTINUED | OUTPATIENT
Start: 2022-03-11 | End: 2022-03-18 | Stop reason: HOSPADM

## 2022-03-11 RX ORDER — HYDROMORPHONE HCL 110MG/55ML
PATIENT CONTROLLED ANALGESIA SYRINGE INTRAVENOUS AS NEEDED
Status: DISCONTINUED | OUTPATIENT
Start: 2022-03-11 | End: 2022-03-11

## 2022-03-11 RX ORDER — DEXAMETHASONE 2 MG/1
2 TABLET ORAL EVERY 8 HOURS SCHEDULED
Status: COMPLETED | OUTPATIENT
Start: 2022-03-13 | End: 2022-03-15

## 2022-03-11 RX ORDER — ONDANSETRON 2 MG/ML
INJECTION INTRAMUSCULAR; INTRAVENOUS AS NEEDED
Status: DISCONTINUED | OUTPATIENT
Start: 2022-03-11 | End: 2022-03-11

## 2022-03-11 RX ORDER — ACETAMINOPHEN 325 MG/1
975 TABLET ORAL EVERY 8 HOURS SCHEDULED
Status: DISCONTINUED | OUTPATIENT
Start: 2022-03-11 | End: 2022-03-18 | Stop reason: HOSPADM

## 2022-03-11 RX ORDER — CHLORHEXIDINE GLUCONATE 0.12 MG/ML
15 RINSE ORAL ONCE
Status: COMPLETED | OUTPATIENT
Start: 2022-03-11 | End: 2022-03-11

## 2022-03-11 RX ORDER — DEXAMETHASONE 4 MG/1
4 TABLET ORAL EVERY 8 HOURS SCHEDULED
Status: COMPLETED | OUTPATIENT
Start: 2022-03-11 | End: 2022-03-13

## 2022-03-11 RX ORDER — CEFAZOLIN SODIUM 2 G/50ML
2000 SOLUTION INTRAVENOUS ONCE
Status: DISCONTINUED | OUTPATIENT
Start: 2022-03-11 | End: 2022-03-11 | Stop reason: HOSPADM

## 2022-03-11 RX ORDER — HYDROMORPHONE HCL IN WATER/PF 6 MG/30 ML
0.2 PATIENT CONTROLLED ANALGESIA SYRINGE INTRAVENOUS
Status: DISCONTINUED | OUTPATIENT
Start: 2022-03-11 | End: 2022-03-11 | Stop reason: HOSPADM

## 2022-03-11 RX ORDER — MIDAZOLAM HYDROCHLORIDE 2 MG/2ML
INJECTION, SOLUTION INTRAMUSCULAR; INTRAVENOUS AS NEEDED
Status: DISCONTINUED | OUTPATIENT
Start: 2022-03-11 | End: 2022-03-11

## 2022-03-11 RX ORDER — SUCCINYLCHOLINE/SOD CL,ISO/PF 100 MG/5ML
SYRINGE (ML) INTRAVENOUS AS NEEDED
Status: DISCONTINUED | OUTPATIENT
Start: 2022-03-11 | End: 2022-03-11

## 2022-03-11 RX ORDER — LIDOCAINE HYDROCHLORIDE AND EPINEPHRINE 10; 10 MG/ML; UG/ML
INJECTION, SOLUTION INFILTRATION; PERINEURAL AS NEEDED
Status: DISCONTINUED | OUTPATIENT
Start: 2022-03-11 | End: 2022-03-11 | Stop reason: HOSPADM

## 2022-03-11 RX ORDER — LIDOCAINE HYDROCHLORIDE 10 MG/ML
INJECTION, SOLUTION EPIDURAL; INFILTRATION; INTRACAUDAL; PERINEURAL AS NEEDED
Status: DISCONTINUED | OUTPATIENT
Start: 2022-03-11 | End: 2022-03-11 | Stop reason: HOSPADM

## 2022-03-11 RX ORDER — DOCUSATE SODIUM 100 MG/1
100 CAPSULE, LIQUID FILLED ORAL 2 TIMES DAILY
Status: DISCONTINUED | OUTPATIENT
Start: 2022-03-11 | End: 2022-03-18 | Stop reason: HOSPADM

## 2022-03-11 RX ORDER — HYDROMORPHONE HCL IN WATER/PF 6 MG/30 ML
0.2 PATIENT CONTROLLED ANALGESIA SYRINGE INTRAVENOUS
Status: DISCONTINUED | OUTPATIENT
Start: 2022-03-11 | End: 2022-03-18 | Stop reason: HOSPADM

## 2022-03-11 RX ORDER — DIPHENHYDRAMINE HYDROCHLORIDE 50 MG/ML
12.5 INJECTION INTRAMUSCULAR; INTRAVENOUS ONCE AS NEEDED
Status: DISCONTINUED | OUTPATIENT
Start: 2022-03-11 | End: 2022-03-11 | Stop reason: HOSPADM

## 2022-03-11 RX ORDER — SODIUM CHLORIDE 9 MG/ML
INJECTION, SOLUTION INTRAVENOUS CONTINUOUS PRN
Status: DISCONTINUED | OUTPATIENT
Start: 2022-03-11 | End: 2022-03-11

## 2022-03-11 RX ORDER — FENTANYL CITRATE/PF 50 MCG/ML
25 SYRINGE (ML) INJECTION
Status: DISCONTINUED | OUTPATIENT
Start: 2022-03-11 | End: 2022-03-11 | Stop reason: HOSPADM

## 2022-03-11 RX ORDER — ONDANSETRON 2 MG/ML
4 INJECTION INTRAMUSCULAR; INTRAVENOUS ONCE AS NEEDED
Status: DISCONTINUED | OUTPATIENT
Start: 2022-03-11 | End: 2022-03-11 | Stop reason: HOSPADM

## 2022-03-11 RX ORDER — ALBUMIN, HUMAN INJ 5% 5 %
SOLUTION INTRAVENOUS CONTINUOUS PRN
Status: DISCONTINUED | OUTPATIENT
Start: 2022-03-11 | End: 2022-03-11

## 2022-03-11 RX ORDER — ONDANSETRON 2 MG/ML
4 INJECTION INTRAMUSCULAR; INTRAVENOUS EVERY 6 HOURS PRN
Status: DISCONTINUED | OUTPATIENT
Start: 2022-03-11 | End: 2022-03-18 | Stop reason: HOSPADM

## 2022-03-11 RX ORDER — VANCOMYCIN HYDROCHLORIDE 1 G/200ML
1000 INJECTION, SOLUTION INTRAVENOUS EVERY 12 HOURS
Status: DISCONTINUED | OUTPATIENT
Start: 2022-03-11 | End: 2022-03-11

## 2022-03-11 RX ORDER — SODIUM CHLORIDE, SODIUM LACTATE, POTASSIUM CHLORIDE, CALCIUM CHLORIDE 600; 310; 30; 20 MG/100ML; MG/100ML; MG/100ML; MG/100ML
INJECTION, SOLUTION INTRAVENOUS CONTINUOUS PRN
Status: DISCONTINUED | OUTPATIENT
Start: 2022-03-11 | End: 2022-03-11

## 2022-03-11 RX ORDER — FENTANYL CITRATE 50 UG/ML
INJECTION, SOLUTION INTRAMUSCULAR; INTRAVENOUS AS NEEDED
Status: DISCONTINUED | OUTPATIENT
Start: 2022-03-11 | End: 2022-03-11

## 2022-03-11 RX ORDER — METHOCARBAMOL 500 MG/1
500 TABLET, FILM COATED ORAL EVERY 6 HOURS SCHEDULED
Status: DISCONTINUED | OUTPATIENT
Start: 2022-03-11 | End: 2022-03-18 | Stop reason: HOSPADM

## 2022-03-11 RX ORDER — OXYCODONE HYDROCHLORIDE 5 MG/1
2.5 TABLET ORAL EVERY 4 HOURS PRN
Status: DISCONTINUED | OUTPATIENT
Start: 2022-03-11 | End: 2022-03-18 | Stop reason: HOSPADM

## 2022-03-11 RX ORDER — PROPOFOL 10 MG/ML
INJECTION, EMULSION INTRAVENOUS CONTINUOUS PRN
Status: DISCONTINUED | OUTPATIENT
Start: 2022-03-11 | End: 2022-03-11

## 2022-03-11 RX ORDER — SENNOSIDES 8.6 MG
1 TABLET ORAL DAILY
Status: DISCONTINUED | OUTPATIENT
Start: 2022-03-12 | End: 2022-03-18 | Stop reason: HOSPADM

## 2022-03-11 RX ORDER — EPHEDRINE SULFATE 50 MG/ML
INJECTION INTRAVENOUS AS NEEDED
Status: DISCONTINUED | OUTPATIENT
Start: 2022-03-11 | End: 2022-03-11

## 2022-03-11 RX ORDER — DEXAMETHASONE 2 MG/1
2 TABLET ORAL DAILY
Status: DISCONTINUED | OUTPATIENT
Start: 2022-03-18 | End: 2022-03-18 | Stop reason: HOSPADM

## 2022-03-11 RX ORDER — CALCIUM CARBONATE 200(500)MG
1000 TABLET,CHEWABLE ORAL DAILY PRN
Status: DISCONTINUED | OUTPATIENT
Start: 2022-03-11 | End: 2022-03-18 | Stop reason: HOSPADM

## 2022-03-11 RX ORDER — SODIUM CHLORIDE 9 MG/ML
75 INJECTION, SOLUTION INTRAVENOUS CONTINUOUS
Status: DISCONTINUED | OUTPATIENT
Start: 2022-03-11 | End: 2022-03-12

## 2022-03-11 RX ORDER — DEXAMETHASONE 2 MG/1
2 TABLET ORAL EVERY 12 HOURS SCHEDULED
Status: COMPLETED | OUTPATIENT
Start: 2022-03-15 | End: 2022-03-17

## 2022-03-11 RX ORDER — DEXAMETHASONE SODIUM PHOSPHATE 10 MG/ML
INJECTION, SOLUTION INTRAMUSCULAR; INTRAVENOUS AS NEEDED
Status: DISCONTINUED | OUTPATIENT
Start: 2022-03-11 | End: 2022-03-11

## 2022-03-11 RX ORDER — LIDOCAINE HYDROCHLORIDE 10 MG/ML
INJECTION, SOLUTION EPIDURAL; INFILTRATION; INTRACAUDAL; PERINEURAL AS NEEDED
Status: DISCONTINUED | OUTPATIENT
Start: 2022-03-11 | End: 2022-03-11

## 2022-03-11 RX ORDER — GLYCOPYRROLATE 0.2 MG/ML
INJECTION INTRAMUSCULAR; INTRAVENOUS AS NEEDED
Status: DISCONTINUED | OUTPATIENT
Start: 2022-03-11 | End: 2022-03-11

## 2022-03-11 RX ADMIN — ACETAMINOPHEN 325MG 975 MG: 325 TABLET ORAL at 21:22

## 2022-03-11 RX ADMIN — METHOCARBAMOL 500 MG: 500 TABLET, FILM COATED ORAL at 23:32

## 2022-03-11 RX ADMIN — METHOCARBAMOL 500 MG: 500 TABLET, FILM COATED ORAL at 21:23

## 2022-03-11 RX ADMIN — EPHEDRINE SULFATE 10 MG: 50 INJECTION INTRAVENOUS at 18:04

## 2022-03-11 RX ADMIN — REMIFENTANIL HYDROCHLORIDE 0.1 MCG/KG/MIN: 1 INJECTION, POWDER, LYOPHILIZED, FOR SOLUTION INTRAVENOUS at 12:45

## 2022-03-11 RX ADMIN — ONDANSETRON 4 MG: 2 INJECTION INTRAMUSCULAR; INTRAVENOUS at 18:31

## 2022-03-11 RX ADMIN — Medication 3000 MG: at 16:39

## 2022-03-11 RX ADMIN — DOCUSATE SODIUM 100 MG: 100 CAPSULE ORAL at 21:23

## 2022-03-11 RX ADMIN — EPHEDRINE SULFATE 5 MG: 50 INJECTION INTRAVENOUS at 16:50

## 2022-03-11 RX ADMIN — DEXAMETHASONE SODIUM PHOSPHATE 10 MG: 10 INJECTION, SOLUTION INTRAMUSCULAR; INTRAVENOUS at 13:34

## 2022-03-11 RX ADMIN — Medication 100 MG: at 12:36

## 2022-03-11 RX ADMIN — EPHEDRINE SULFATE 10 MG: 50 INJECTION INTRAVENOUS at 17:50

## 2022-03-11 RX ADMIN — OXYCODONE HYDROCHLORIDE 5 MG: 5 TABLET ORAL at 23:32

## 2022-03-11 RX ADMIN — AMLODIPINE BESYLATE 5 MG: 5 TABLET ORAL at 09:45

## 2022-03-11 RX ADMIN — GLYCOPYRROLATE 0.4 MG: 0.2 INJECTION, SOLUTION INTRAMUSCULAR; INTRAVENOUS at 15:20

## 2022-03-11 RX ADMIN — DEXAMETHASONE 4 MG: 4 TABLET ORAL at 21:23

## 2022-03-11 RX ADMIN — PHENYLEPHRINE HYDROCHLORIDE 50 MCG/MIN: 10 INJECTION INTRAVENOUS at 13:41

## 2022-03-11 RX ADMIN — PROPOFOL 200 MG: 10 INJECTION, EMULSION INTRAVENOUS at 12:35

## 2022-03-11 RX ADMIN — INSULIN LISPRO 1 UNITS: 100 INJECTION, SOLUTION INTRAVENOUS; SUBCUTANEOUS at 22:06

## 2022-03-11 RX ADMIN — ONDANSETRON 4 MG: 2 INJECTION INTRAMUSCULAR; INTRAVENOUS at 13:34

## 2022-03-11 RX ADMIN — LIDOCAINE HYDROCHLORIDE 50 MG: 10 INJECTION, SOLUTION EPIDURAL; INFILTRATION; INTRACAUDAL; PERINEURAL at 12:35

## 2022-03-11 RX ADMIN — CHLORHEXIDINE GLUCONATE 15 ML: 1.2 SOLUTION ORAL at 10:55

## 2022-03-11 RX ADMIN — MIDAZOLAM 2 MG: 1 INJECTION INTRAMUSCULAR; INTRAVENOUS at 12:26

## 2022-03-11 RX ADMIN — EPHEDRINE SULFATE 5 MG: 50 INJECTION INTRAVENOUS at 17:48

## 2022-03-11 RX ADMIN — VANCOMYCIN HYDROCHLORIDE 1000 MG: 10 INJECTION, POWDER, LYOPHILIZED, FOR SOLUTION INTRAVENOUS at 21:41

## 2022-03-11 RX ADMIN — ALBUMIN (HUMAN): 12.5 INJECTION, SOLUTION INTRAVENOUS at 17:56

## 2022-03-11 RX ADMIN — DEXAMETHASONE SODIUM PHOSPHATE 4 MG: 4 INJECTION INTRA-ARTICULAR; INTRALESIONAL; INTRAMUSCULAR; INTRAVENOUS; SOFT TISSUE at 05:47

## 2022-03-11 RX ADMIN — SODIUM CHLORIDE 75 ML/HR: 0.9 INJECTION, SOLUTION INTRAVENOUS at 21:24

## 2022-03-11 RX ADMIN — ALBUMIN (HUMAN): 12.5 INJECTION, SOLUTION INTRAVENOUS at 17:36

## 2022-03-11 RX ADMIN — HYDROMORPHONE HYDROCHLORIDE 0.5 MG: 2 INJECTION, SOLUTION INTRAMUSCULAR; INTRAVENOUS; SUBCUTANEOUS at 19:23

## 2022-03-11 RX ADMIN — Medication 3000 MG: at 12:50

## 2022-03-11 RX ADMIN — DULOXETINE HYDROCHLORIDE 60 MG: 60 CAPSULE, DELAYED RELEASE ORAL at 21:22

## 2022-03-11 RX ADMIN — PROPOFOL 100 MG: 10 INJECTION, EMULSION INTRAVENOUS at 13:47

## 2022-03-11 RX ADMIN — SODIUM CHLORIDE, SODIUM LACTATE, POTASSIUM CHLORIDE, AND CALCIUM CHLORIDE: .6; .31; .03; .02 INJECTION, SOLUTION INTRAVENOUS at 12:19

## 2022-03-11 RX ADMIN — PROPOFOL 140 MCG/KG/MIN: 10 INJECTION, EMULSION INTRAVENOUS at 12:45

## 2022-03-11 RX ADMIN — HYDROMORPHONE HYDROCHLORIDE 0.5 MG: 2 INJECTION, SOLUTION INTRAMUSCULAR; INTRAVENOUS; SUBCUTANEOUS at 18:25

## 2022-03-11 RX ADMIN — FLECAINIDE ACETATE 100 MG: 100 TABLET ORAL at 09:45

## 2022-03-11 RX ADMIN — MIDAZOLAM 2 MG: 1 INJECTION INTRAMUSCULAR; INTRAVENOUS at 12:35

## 2022-03-11 RX ADMIN — EPHEDRINE SULFATE 10 MG: 50 INJECTION INTRAVENOUS at 15:49

## 2022-03-11 RX ADMIN — FENTANYL CITRATE 100 MCG: 50 INJECTION INTRAMUSCULAR; INTRAVENOUS at 12:35

## 2022-03-11 RX ADMIN — SODIUM CHLORIDE: 0.9 INJECTION, SOLUTION INTRAVENOUS at 12:41

## 2022-03-11 RX ADMIN — GLYCOPYRROLATE 0.4 MG: 0.2 INJECTION, SOLUTION INTRAMUSCULAR; INTRAVENOUS at 13:43

## 2022-03-11 NOTE — PROGRESS NOTES
[facility]  Progress Note - Jhonathan Tay 1950, 71 y o  male MRN: [id number]  Unit/Bed#: -01 Encounter: [id number]  Primary Care Provider: Toni Tilley MD   Date and time admitted to hospital: 3/5/2022  8:18 PM    * Cervical myelopathy ([address] )  Assessment & Plan  · Patient presented with bilateral lower extremity weakness and right-sided neck discomfort right arm numbness tingling x 3 weeks  · According to transferring notes   · " Discussed with neurosurgery Dr Marjan Mojica; recommending transfer to [facility] with neurosurgery consult for surgical intervention  Recommended holding coumadin for now  Surgical intervention timing TBD  Trend INR daily "  · heparin gtt  · Goal INR per neurosugery, <1 4  · IV steroids per neurosurgery  · Cardiology clearance obtained  · Acceptable risk from medical standpoint to proceed to OR for anterior cervical diskectomy   · NPO for tentative procedure      Ambulatory dysfunction  Assessment & Plan  · Management as above  · PT OT evaluate -discussed with neurosurgery okay for cautious mobilizing with PT    · Defer PT recs and mobility recs to neurosurgery       Paroxysmal A-fib (HCC)  Assessment & Plan  · Continue heart rate control medications  · Hold warfarin as per Neurosurgery recommendation  · Cardiology following  · Holding heparin drip due to procedure      Goals of care, counseling/discussion  Assessment & Plan  Discussed code status with patient and his spouse who was present on the phone during the time of the conversation  Patient continues to stay that he wishes to be DNR DNI  He understands that the DNI order will need to be rescinded prior to procedure if he requires intubation and reinstated after the procedure  He agrees and also does his wife      Sinus bradycardia  Assessment & Plan  · Sinus bradycardia on EKG  · Patient asymptomatic  · Cardiology following    Mixed hyperlipidemia  Assessment & Plan  · Continue statin    Factor V Leiden mutation [facility])  Assessment & Plan  · On Coumadin outpatient  · On heparin drip while inpatient due to the tentative neuro surgical intervention and to get up INR in range for procedure  · Heparin drip stopped at midnight for tentative procedure today    WILL (obstructive sleep apnea)  Assessment & Plan  · Continue CPAP at night  Essential hypertension  Assessment & Plan  · Continue home blood pressure meds with holding parameters          VTE Pharmacologic Prophylaxis: VTE Score: 3 Moderate Risk (Score 3-4) - Pharmacological DVT Prophylaxis Ordered: heparin  Patient Centered Rounds: I performed bedside rounds with nursing staff today  Discussions with Specialists or Other Care Team Provider: discussed with neurosurgery and let them know that they can freely change steroids as needed  Education and Discussions with Family / Patient: called wife Ramez Schmitt   Time Spent for Care: 30 minutes  More than 50% of total time spent on counseling and coordination of care as described above  Current Length of Stay: 6 day(s)  Current Patient Status: Inpatient   Certification Statement: The patient will continue to require additional inpatient hospital stay due to IV steroids and surgical intervention   Discharge Plan: as per neurosurgery     Code Status: Level 3 - DNAR and DNI    Subjective:   Seen for follow up for right sided weakness and cervical myelopathy  Plan for interventional neuro surgery today  Denies any new numbness, tingling, headaches or weakness  Improvements controlling his urine    Objective:     Vitals:   Temp (24hrs), Av 8 °F (36 6 °C), Min:97 7 °F (36 5 °C), Max:97 9 °F (36 6 °C)    Temp:  [97 7 °F (36 5 °C)-97 9 °F (36 6 °C)] 97 7 °F (36 5 °C)  HR:  [52-58] 58  Resp:  [17] 17  BP: (162-165)/(83-96) 165/96  SpO2:  [94 %-97 %] 97 %  There is no height or weight on file to calculate BMI  Input and Output Summary (last 24 hours):      Intake/Output Summary (Last 24 hours) at 3/11/2022 1478  Last data filed at 3/10/2022 2056  Gross per 24 hour   Intake --   Output 2250 ml   Net -2250 ml       Physical Exam:   Physical Exam  Constitutional:       General: He is not in acute distress  Appearance: He is not ill-appearing or toxic-appearing  HENT:      Head: Normocephalic  Nose: Nose normal       Mouth/Throat:      Mouth: Mucous membranes are moist    Eyes:      General: No scleral icterus  Right eye: No discharge  Left eye: No discharge  Pupils: Pupils are equal, round, and reactive to light  Cardiovascular:      Rate and Rhythm: Normal rate  Pulmonary:      Effort: Pulmonary effort is normal  No respiratory distress  Breath sounds: No stridor  No wheezing, rhonchi or rales  Chest:      Chest wall: No tenderness  Abdominal:      General: Abdomen is flat  There is no distension  Palpations: There is no mass  Tenderness: There is no abdominal tenderness  There is no left CVA tenderness, guarding or rebound  Hernia: No hernia is present  Musculoskeletal:         General: No swelling, tenderness, deformity or signs of injury  Right lower leg: No edema  Left lower leg: No edema  Skin:     Capillary Refill: Capillary refill takes less than 2 seconds  Coloration: Skin is not jaundiced or pale  Findings: No bruising, erythema, lesion or rash  Neurological:      Mental Status: He is alert  Cranial Nerves: No cranial nerve deficit  Sensory: No sensory deficit  Motor: No weakness        Gait: Gait normal       Deep Tendon Reflexes: Reflexes normal       Comments: RLE weakness and hand weakness      Psychiatric:         Mood and Affect: Mood normal           Additional Data:     Labs:  Results from last 7 days   Lab Units 03/11/22  0611 03/10/22  0516 03/08/22  0535   WBC Thousand/uL 10 06   < > 9 58   HEMOGLOBIN g/dL 14 6   < > 13 1   HEMATOCRIT % 41 7   < > 36 9   PLATELETS Thousands/uL 237 < > 215   NEUTROS PCT %  --   --  87*   LYMPHS PCT %  --   --  10*   MONOS PCT %  --   --  2*   EOS PCT %  --   --  0    < > = values in this interval not displayed  Results from last 7 days   Lab Units 03/11/22  0611 03/10/22  0232 03/08/22  0535   SODIUM mmol/L 135*   < > 137   POTASSIUM mmol/L 3 6   < > 3 8   CHLORIDE mmol/L 102   < > 107   CO2 mmol/L 26   < > 26   BUN mg/dL 20   < > 13   CREATININE mg/dL 0 88   < > 0 82   ANION GAP mmol/L 7   < > 4   CALCIUM mg/dL 9 7   < > 9 4   ALBUMIN g/dL  --   --  3 4*   TOTAL BILIRUBIN mg/dL  --   --  0 58   ALK PHOS U/L  --   --  57   ALT U/L  --   --  18   AST U/L  --   --  16   GLUCOSE RANDOM mg/dL 151*   < > 156*    < > = values in this interval not displayed  Results from last 7 days   Lab Units 03/11/22  0611   INR  1 04     Results from last 7 days   Lab Units 03/11/22  0701 03/10/22  2229 03/10/22  1801 03/10/22  1601 03/10/22  1506 03/10/22  1118 03/10/22  0940 03/09/22  2220 03/09/22  1401 03/09/22  0932 03/08/22  2110 03/08/22  1812   POC GLUCOSE mg/dl 147* 143* 138 132 116 123 149* 143* 128 154* 139 188*     Results from last 7 days   Lab Units 03/05/22  0414   HEMOGLOBIN A1C % 6 3*           Lines/Drains:  Invasive Devices  Report    Peripheral Intravenous Line            Peripheral IV 03/08/22 Left Forearm 3 days                      Imaging: No pertinent imaging reviewed      Recent Cultures (last 7 days):         Last 24 Hours Medication List:   Current Facility-Administered Medications   Medication Dose Route Frequency Provider Last Rate    acetaminophen  650 mg Oral Q4H PRN Medina Ge MD      amLODIPine  5 mg Oral Daily Sravanthi Cunningham MD      atorvastatin  40 mg Oral QPM Medina Ge MD      chlorhexidine  15 mL Swish & Spit Once Elif Carpenter MD      dexamethasone  4 mg Intravenous Q6H [address] [employer] Nilson Gonsales PA-C      DULoxetine  60 mg Oral HS Medina Ge MD      flecainide  100 mg Oral BID Medina Ge, MD      insulin lispro  1-5 Units Subcutaneous HS Savannah Bower MD      insulin lispro  2-12 Units Subcutaneous 4 times day Savannah Bower MD      metoprolol succinate  100 mg Oral Daily Jose L Lloyd MD      neomycin-polymyxin B  1 mL in sodium chloride 0 9% 1000 mL irrigation bottle   Irrigation Once [place] Chilel MD      pantoprazole  40 mg Oral Early Morning Savannah Bower MD          Today, Patient Was Seen By: Savannah Bower MD    **Please Note: This note may have been constructed using a voice recognition system  **

## 2022-03-11 NOTE — ANESTHESIA PROCEDURE NOTES
Arterial Line Insertion  Performed by: Hortencia Yeh MD  Authorized by: Hortencia Yeh MD   Consent: Written consent obtained  Risks and benefits: risks, benefits and alternatives were discussed  Consent given by: patient  Patient understanding: patient states understanding of the procedure being performed  Patient consent: the patient's understanding of the procedure matches consent given  Procedure consent: procedure consent matches procedure scheduled  Radiology Images: Radiology Images displayed and confirmed  If images not available, report reviewed  Required items: required blood products, implants, devices, and special equipment available  Patient identity confirmed: verbally with patient and arm band  Preparation: Patient was prepped and draped in the usual sterile fashion  Indications: hemodynamic monitoring  Orientation:  Left  Location: radial artery  Sedation:  Patient sedated: GA     Procedure Details:  Needle gauge: 20  Seldinger technique: Seldinger technique used  Number of attempts: 1    Post-procedure:  Post-procedure: dressing applied  Waveform: good waveform  Post-procedure CNS: unchanged    Comments: Ultrasound-guided left radial arterial line placement  Wire advanced without resistance

## 2022-03-11 NOTE — ANESTHESIA PREPROCEDURE EVALUATION
Procedure:  anterior cervical disklectomy and fixation fusion C5/6 and C6/7  Posterior cervical decompression and instrumented fusion C3-T1, posssible additional levels (N/A Spine Cervical)    Relevant Problems   CARDIO   (+) Essential hypertension   (+) Mixed hyperlipidemia   (+) Paroxysmal A-fib (HCC)   (+) Sinus bradycardia      MUSCULOSKELETAL   (+) Pes anserinus bursitis of right knee   (+) Primary osteoarthritis of left knee   (+) Primary osteoarthritis of right knee      NEURO/PSYCH   (+) Cervical myelopathy (HCC)   (+) Depression, major, single episode, moderate (HCC)   (+) History of DVT of lower extremity      PULMONARY   (+) WILL (obstructive sleep apnea)     MRI C-spine 3/5/2022:  IMPRESSION:  No acute abnormality  Motion limited    Multilevel degenerative spondylosis progressed from 2013 most severe at C5-6 with severe canal stenosis and flattening of the cord  Small focus of T2 signal within the cord at C5-C6 may represent myelomalacia  Moderate canal stenosis at   C3-4 mildly indents the cord without signal abnormality  Marked multilevel foraminal stenosis  No prevertebral edema or mass  TTE 3/4/2022:    Left Ventricle: Left ventricular cavity size is normal  Wall thickness is normal  The left ventricular ejection fraction is 60%  Systolic function is normal  Wall motion is normal  Diastolic function is moderately abnormal, consistent with grade II (pseudonormal) relaxation    Left Atrium: The atrium is moderately dilated    Right Atrium: The atrium is mildly dilated    Aortic Valve: The aortic valve is trileaflet  The leaflets are moderately calcified  There is moderately reduced mobility  There is trace regurgitation  There is moderate stenosis  Mean gradiet 17 mmHg, EDELMIRA 1 3 cm2    Mitral Valve: There is mild regurgitation    Tricuspid Valve: There is mild regurgitation      EKG 3/4/2022:  Sinus bradycardia, 59 bpm, 1st degree AVB, RBBB    Lab Results   Component Value Date    WBC 10 06 03/11/2022    HGB 14 6 03/11/2022    HCT 41 7 03/11/2022    MCV 86 03/11/2022     03/11/2022     Lab Results   Component Value Date    GLUCOSE 102 (H) 10/25/2017    CALCIUM 9 7 03/11/2022     10/25/2017    K 3 6 03/11/2022    CO2 26 03/11/2022     03/11/2022    BUN 20 03/11/2022    CREATININE 0 88 03/11/2022     Lab Results   Component Value Date    INR 1 04 03/11/2022    INR 1 22 (H) 03/10/2022    INR 1 25 (H) 03/09/2022    PROTIME 13 2 03/11/2022    PROTIME 14 9 (H) 03/10/2022    PROTIME 15 2 (H) 03/09/2022     Lab Results   Component Value Date    PTT 45 (H) 03/10/2022     Type and Screen:  A      Physical Exam    Airway  Comment: Reports crepitus with maximal neck extension and pain with maximal neck flexion  Denies pain or worsening of paresthesias with neck extension  Mallampati score: I  TM Distance: >3 FB  Neck ROM: limited     Dental   Comment: Partial upper and lower dentures removed,     Cardiovascular  Rhythm: regular,     Pulmonary  Comment: Speaking in full sentences, normal work of breathing, not tachypneic,     Other Findings        Anesthesia Plan  ASA Score- 3     Anesthesia Type- general with ASA Monitors  Additional Monitors: arterial line  Airway Plan: ETT  Plan Factors-Exercise tolerance (METS): <4 METS  Exercise comment: Limited by knee pain  Chart reviewed  EKG reviewed  Imaging results reviewed  Existing labs reviewed  Patient summary reviewed  Patient is not a current smoker  Obstructive sleep apnea risk education given perioperatively  Induction- intravenous  Postoperative Plan- Plan for postoperative opioid use  Informed Consent- Anesthetic plan and risks discussed with patient  Gayl Angelucci MD, personally examined the patient, reviewed the patient history and laboratory data, and explained the risks/benefits of the anesthetic, including POVL, to the patient   The patient has signed the appropriate consents and is ready to proceed

## 2022-03-11 NOTE — PROGRESS NOTES
General Cardiology   Progress Note -  [employer]   Kirsten Pih 71 y o  male MRN: [id number]    Unit/Bed#: -01 Encounter: [id number]    Assessment     1  Pre operative risk assessment  2  Cervical myelopathy   3  Paroxysmal atrial fibrillation/flutter  4  Hypertension   5  Aortic stenosis   6  Hyperlipidemia   7  WILL   8  Factor V Leiden On heparin gtt          Plan  ECG showed sinus rhythm  RRR on exam this morning   On flecainide and metoprolol XL for rhythm control   BP stable: 164/90  Echocardiogram 3/4/2022 showed EF 60%, grade II diastolic dysfunction, LA moderately dilated, RA mildly dilated, moderate aortic stenosis, mild MR and mild TR   Patient reports no exertional SOB or chest pain  His activity is limited due to OA but he is able to walk a block and up a flight of stairs without complaint of chest pain  Patient at acceptable risk from cardiac standpoint to proceed to OR for anterior cervical diskectomy fixation and fusion  Will follow post operatively       Subjective  Patient offers no cardiac complaints  No chest pain, SOB or palpitations  No fever or chills  Review of Systems   Constitutional: Negative for chills and fever  HENT: Negative for congestion  Cardiovascular: Negative for chest pain, dyspnea on exertion, leg swelling, orthopnea and palpitations  Respiratory: Negative for shortness of breath  Musculoskeletal: Negative for falls  Gastrointestinal: Negative for bloating, nausea and vomiting  Neurological: Negative for dizziness and light-headedness  Psychiatric/Behavioral: Negative for altered mental status  All other systems reviewed and are negative  Objective:   Vitals: Blood pressure 165/96, pulse 58, temperature 97 7 °F (36 5 °C), resp  rate 17, SpO2 97 %  ,       There is no height or weight on file to calculate BMI ,     Systolic (00RUB), XFH:053 , Min:162 , WGA:569     Diastolic (27QNF), AYU:90, Min:83, Max:96      Intake/Output Summary (Last 24 hours) at 3/11/2022 2303  Last data filed at 3/10/2022 2056  Gross per 24 hour   Intake --   Output 2250 ml   Net -2250 ml     Weight (last 2 days)     None        Telemetry Review: No telemetry     Physical Exam  Constitutional:       General: He is not in acute distress  HENT:      Head: Normocephalic  Mouth/Throat:      Mouth: Mucous membranes are moist    Cardiovascular:      Rate and Rhythm: Normal rate and regular rhythm  Pulses: Normal pulses  Heart sounds: Murmur heard  Comments: Grade II systolic murmur   Pulmonary:      Effort: Pulmonary effort is normal  No respiratory distress  Breath sounds: Normal breath sounds  Abdominal:      General: Bowel sounds are normal       Palpations: Abdomen is soft  Musculoskeletal:         General: Normal range of motion  Cervical back: Neck supple  Skin:     General: Skin is warm and dry  Capillary Refill: Capillary refill takes less than 2 seconds  Neurological:      General: No focal deficit present  Mental Status: He is alert and oriented to person, place, and time     Psychiatric:         Mood and Affect: Mood normal          LABORATORY RESULTS      CBC with diff:   Results from last 7 days   Lab Units 03/11/22  0611 03/10/22  0516 03/08/22  0535 03/07/22  0512 03/06/22  1326 03/06/22  0448 03/05/22  0414 03/04/22  1454 03/04/22  1454   WBC Thousand/uL 10 06 9 40 9 58 7 20 4 32 4 29* 4 40   < > 5 22   HEMOGLOBIN g/dL 14 6 13 9 13 1 13 4 14 4 14 0 13 2   < > 14 1   HEMATOCRIT % 41 7 39 7 36 9 41 1 42 5 40 2 40 9   < > 42 9   MCV fL 86 88 89 93 89 88 92   < > 90   PLATELETS Thousands/uL 237 237 215 220 206 191 189   < > 221   MCH pg 30 2 30 7 30 7 30 2 30 3 30 7 29 7   < > 29 7   MCHC g/dL 35 0 35 0 35 5 32 6 33 9 34 8 32 3   < > 32 9   RDW % 12 3 12 7 12 7 12 6 12 5 12 8 12 5   < > 12 4   MPV fL 10 0 10 5 9 8 10 5 9 4 10 1 9 2   < > 9 8   NRBC AUTO /100 WBCs  --   --  0 0  --  0 0  --  0    < > = values in this interval not displayed         CMP:  Results from last 7 days   Lab Units 03/11/22  0611 03/10/22  0232 03/08/22  0535 03/07/22  0512 03/06/22  0448 03/05/22  0414 03/04/22  1454   POTASSIUM mmol/L 3 6 3 8 3 8 3 6 3 4* 3 8 4 2   CHLORIDE mmol/L 102 104 107 106 105 102 101   CO2 mmol/L 26 30 26 24 27 29 31   BUN mg/dL 20 17 13 13 12 12 11   CREATININE mg/dL 0 88 0 87 0 82 0 68 0 66 0 76 0 76   CALCIUM mg/dL 9 7 9 5 9 4 9 3 9 1 8 6 9 2   AST U/L  --   --  16 16  --   --  20   ALT U/L  --   --  18 18  --   --  24   ALK PHOS U/L  --   --  57 62  --   --  69   EGFR ml/min/1 73sq m 86 86 88 96 97 91 91       BMP:  Results from last 7 days   Lab Units 03/11/22  0611 03/10/22  0232 03/08/22  0535 03/07/22  0512 03/06/22  0448 03/05/22  0414 03/04/22  1454   POTASSIUM mmol/L 3 6 3 8 3 8 3 6 3 4* 3 8 4 2   CHLORIDE mmol/L 102 104 107 106 105 102 101   CO2 mmol/L 26 30 26 24 27 29 31   BUN mg/dL 20 17 13 13 12 12 11   CREATININE mg/dL 0 88 0 87 0 82 0 68 0 66 0 76 0 76   CALCIUM mg/dL 9 7 9 5 9 4 9 3 9 1 8 6 9 2       Lab Results   Component Value Date    NTBNP 121 03/04/2022       Results from last 7 days   Lab Units 03/10/22  0232   MAGNESIUM mg/dL 2 0     Results from last 7 days   Lab Units 03/05/22  0414   HEMOGLOBIN A1C % 6 3*     Results from last 7 days   Lab Units 03/10/22  0232   TSH 3RD GENERATON uIU/mL 0 638       Results from last 7 days   Lab Units 03/11/22  0611 03/10/22  0516 03/09/22  0438 03/07/22  0512 03/06/22  1326 03/06/22  0448 03/05/22  0414   INR  1 04 1 22* 1 25* 1 77* 1 83* 1 96* 2 00*       Lipid Profile:   Lab Results   Component Value Date    CHOL 151 10/25/2017    CHOL 169 11/03/2016    CHOL 176 09/10/2015     Lab Results   Component Value Date    HDL 50 03/05/2022    HDL 47 11/02/2021    HDL 65 11/05/2020     Lab Results   Component Value Date    LDLCALC 123 (H) 03/05/2022    LDLCALC 107 (H) 11/02/2021    LDLCALC 128 (H) 11/05/2020     Lab Results   Component Value Date    TRIG 164 (H) 03/05/2022 TRIG 181 (H) 2021    TRIG 116 2020       Cardiac testing:   Results for orders placed during the hospital encounter of 18    Echo complete with contrast if indicated    Narrative  [facility] [address] [facility]  [phone]    Transthoracic Echocardiogram  2D, M-mode, Doppler, and Color Doppler    Study date:  2018    Patient: Yolie Jensen  MR number: [id number]  Account number: [de-identified]  : 1950  Age: 80 years  Gender: Male  Status: Outpatient  Location: [address] [place] [address]  Height: 72 in  Weight: 277 4 lb  BP: 116/ 78 mmHg    Indications: Atrial fibrillation  Diagnoses: I48 0 - Atrial fibrillation    Sonographer:  KAY Gregorio RDCS RVT  Primary Physician:  Alonzo Hernandez MD  Referring Physician:  David Gonzales MD  Group:  [facility]  Interpreting Physician:  Reid Dixon MD    SUMMARY    LEFT VENTRICLE:  Systolic function was mildly reduced by visual assessment  Ejection fraction was estimated to be 45 %  There was mild diffuse hypokinesis  Wall thickness was mildly increased  LEFT ATRIUM:  The atrium was mildly dilated  RIGHT ATRIUM:  The atrium was mildly dilated  MITRAL VALVE:  There was mild regurgitation  TRICUSPID VALVE:  There was mild regurgitation  HISTORY: PRIOR HISTORY: Hyperlipidemia, Coumadin, Sleep apnea  PRIOR PROCEDURES: Arrhythmia ablation  PROCEDURE: The study was performed in the [address] [place] [address]  This was a routine study  The transthoracic approach was used  The study included complete 2D imaging, M-mode, complete spectral Doppler, and color Doppler  Images were obtained from the parasternal, apical, subcostal, and suprasternal notch acoustic windows  Echocardiographic views were limited due to decreased penetration and lung interference  This was a technically difficult study      LEFT VENTRICLE: Size was normal  Systolic function was mildly reduced by visual assessment  Ejection fraction was estimated to be 45 %  There was mild diffuse hypokinesis  Wall thickness was mildly increased  DOPPLER: Transmitral flow  pattern: atrial fibrillation  RIGHT VENTRICLE: The size was normal  Systolic function was normal  DOPPLER: Systolic pressure was not estimated  LEFT ATRIUM: The atrium was mildly dilated  RIGHT ATRIUM: The atrium was mildly dilated  MITRAL VALVE: Valve structure was normal  There was normal leaflet separation  DOPPLER: The transmitral velocity was within the normal range  There was no evidence for stenosis  There was mild regurgitation  AORTIC VALVE: The valve was trileaflet  Leaflets exhibited normal thickness, mild calcification, and normal cuspal separation  DOPPLER: Transaortic velocity was within the normal range  There was no evidence for stenosis  There was no  regurgitation  TRICUSPID VALVE: The valve structure was normal  There was normal leaflet separation  DOPPLER: The transtricuspid velocity was within the normal range  There was no evidence for stenosis  There was mild regurgitation  PULMONIC VALVE: Leaflets exhibited normal thickness, no calcification, and normal cuspal separation  DOPPLER: The transpulmonic velocity was within the normal range  There was no regurgitation  PERICARDIUM: There was no pericardial effusion  AORTA: The root exhibited normal size  SYSTEMIC VEINS: IVC: The inferior vena cava was normal in size and course   Respirophasic changes were normal     SYSTEM MEASUREMENT TABLES    2D  %FS: 32 47 %  Ao Diam: 3 04 cm  EDV(Teich): 131 46 ml  EF(Teich): 60 39 %  ESV(Cube): 44 15 ml  ESV(Teich): 52 08 ml  IVSd: 1 17 cm  LA Area: 25 58 cm2  LA Diam: 5 06 cm  LVEDV MOD A4C: 85 53 ml  LVEF MOD A4C: 57 2 %  LVESV MOD A4C: 36 61 ml  LVIDd: 5 23 cm  LVIDs: 3 53 cm  LVLd A4C: 7 43 cm  LVLs A4C: 6 23 cm  LVPWd: 1 17 cm  RA Area: 20 53 cm2  RV Diam : 4 cm  SI(Cube): 40 49 ml/m2  SI(Teich): 32 4 ml/m2  SV MOD A4C: 48 92 ml  SV(Cube): 99 21 ml  SV(Teich): 79 38 ml    MM  TAPSE: 2 06 cm    PW  E': 0 08 m/s    [employer] Accredited Echocardiography Laboratory    Prepared and electronically signed by    Emeterio Marte MD  Signed 2018 12:00:40    No results found for this or any previous visit  No results found for this or any previous visit  No valid procedures specified  Results for orders placed during the hospital encounter of 18    NM myocardial perfusion spect (stress and/or rest)    Narrative  [facility] [employer] [address]    Stress Gated SPECT Myocardial Perfusion Imaging after Exercise    Patient: Abdirashid More  MR number: [id number]  Account number: [de-identified]  : 1950  Age: 80 years  Gender: Male  Status: Outpatient  Location: [address] [place] [address]  Height: 72 in  Weight: 258 lb  BP: 116/ 93 mmHg    Allergies: MORPHINE    Diagnosis: I48 1 - Atrial flutter    Primary Physician:  Peter Coker MD  RN:  Siomara Colon  Referring Physician:  Matheus Ventura MD  Technician:  Nancy Age:  [facility]  Interpreting Physician:  Matheus Ventura MD    HISTORY: Factor V Leiden, Ablation, WILL, Cardioversion  The patient is a 80year old  male  Chest pain status: no chest pain  Coronary artery disease risk factors: dyslipidemia and family history of premature coronary artery  disease  Cardiovascular history: arrhythmia  Co-morbidity: obesity  Previous test results: abnormal ECG  PHYSICAL EXAM: Baseline physical exam screening: no wheezes audible  REST ECG: Atrial fibrillation  PROCEDURE: The study was performed in the the [address] [place] [address]  Treadmill exercise testing was performed, using the Nirmal protocol  Gated SPECT myocardial perfusion imaging was performed during stress   Systolic blood  pressure was 116 mmHg, at the start of the study  Diastolic blood pressure was 93 mmHg, at the start of the study  The heart rate was 87 bpm, at the start of the study  IV double checked  ALYX PROTOCOL:  HR bpm SBP mmHg DBP mmHg Symptoms  Baseline 87 116 93 none  Stage 1 120 100 71 leg pain  Stage 2 144 144 72 fatigue, leg pain  Recovery 1 137 174 93 subsiding  Recovery 2 136 176 99 subsiding  Recovery 3 136 148 97 none  Recovery 5 115 137 99 none  No medications or fluids given  STRESS SUMMARY: 02 sat 97% baseline, 97% peak  Duration of exercise was 6 min and 0 sec  The patient exercised to protocol stage 2  Maximal work rate was 7 METs  Functional capacity was slightly decreased (20% to 30%)  Maximal heart rate  during stress was 164 bpm ( 107 % of maximal predicted heart rate)  The heart rate response to stress was normal  There was normal resting blood pressure with an appropriate response to stress  The rate-pressure product for the peak heart  rate and blood pressure was 06521  There was no chest pain during stress  The stress test was terminated due to leg pain and fatigue  The stress ECG was negative for ischemia and normal  Arrhythmia during stress: Persistence of atrial  fibrillation throughout the study  ISOTOPE ADMINISTRATION:  Resting isotope administration Stress isotope administration  Agent Tetrofosmin Tetrofosmin  Dose 15 8 mCi 48 2 mCi  Date 09/21/2018 09/21/2018  Injection-image interval 44 min 41 min    The radiopharmaceutical was injected one minute before the end of exercise  MYOCARDIAL PERFUSION IMAGING:  The image quality was good  Rotating projection images reveal mild diaphragmatic attenuation  Left ventricular size was normal  The TID ratio was 0 88  PERFUSION DEFECTS:  -  There were no significant perfusion defects  PERFUSION QUANTITATION:  The summed perfusion score measured 0 during stress      GATED SPECT:  The calculated left ventricular ejection fraction was 61 %  Left ventricular ejection fraction was within normal limits by visual estimate  There was no left ventricular regional abnormality  SUMMARY:  -  Stress results: Duration of exercise was 6 min and 0 sec  Maximal work rate was 7 METs  Functional capacity was slightly decreased (20% to 30%)  Target heart rate was achieved  There was no chest pain during stress  -  ECG conclusions: The stress ECG was negative for ischemia and normal  Arrhythmia during stress: Persistence of atrial fibrillation throughout the study  -  Perfusion imaging: There were no significant perfusion defects   -  Gated SPECT: The calculated left ventricular ejection fraction was 61 %  Left ventricular ejection fraction was within normal limits by visual estimate  There was no left ventricular regional abnormality  IMPRESSIONS: Normal study after maximal exercise  Myocardial perfusion imaging was normal at rest and with stress   Left ventricular systolic function was normal     Prepared and signed by    Ale Dominguez MD  Signed 09/21/2018 13:46:16    Meds/Allergies   all current active meds have been reviewed and current meds:   Current Facility-Administered Medications   Medication Dose Route Frequency    acetaminophen (TYLENOL) tablet 650 mg  650 mg Oral Q4H PRN    amLODIPine (NORVASC) tablet 5 mg  5 mg Oral Daily    atorvastatin (LIPITOR) tablet 40 mg  40 mg Oral QPM    chlorhexidine (PERIDEX) 0 12 % oral rinse 15 mL  15 mL Swish & Spit Once    dexamethasone (DECADRON) injection 4 mg  4 mg Intravenous Q6H [address]    DULoxetine (CYMBALTA) delayed release capsule 60 mg  60 mg Oral HS    flecainide (TAMBOCOR) tablet 100 mg  100 mg Oral BID    insulin lispro (HumaLOG) 100 units/mL subcutaneous injection 1-5 Units  1-5 Units Subcutaneous HS    insulin lispro (HumaLOG) 100 units/mL subcutaneous injection 2-12 Units  2-12 Units Subcutaneous 4 times day    metoprolol succinate (TOPROL-XL) 24 hr tablet 100 mg  100 mg Oral Daily    neomycin-polymyxin B (NEOSPORIN-) 1 mL in sodium chloride 0 9 % 1,000 mL irrigation bottle   Irrigation Once    pantoprazole (PROTONIX) EC tablet 40 mg  40 mg Oral Early Morning     Facility-Administered Medications Prior to Admission   Medication    bupivacaine (MARCAINE) 0 25 % injection 4 mL    methylPREDNISolone acetate (DEPO-MEDROL) injection 1 mL     Medications Prior to Admission   Medication    celecoxib (CeleBREX) 200 mg capsule    [] cephalexin (KEFLEX) 500 mg capsule    [employer] Vitamin C 500 MG tablet    DULoxetine (CYMBALTA) 60 mg delayed release capsule    flecainide (TAMBOCOR) 100 mg tablet    metoprolol succinate (TOPROL-XL) 100 mg 24 hr tablet    pravastatin (PRAVACHOL) 40 mg tablet    tamsulosin (FLOMAX) 0 4 mg    warfarin (COUMADIN) 5 mg tablet    Diclofenac Sodium (VOLTAREN EX)    zolpidem (AMBIEN) 10 mg tablet     Counseling / Coordination of Care  Total floor / unit time spent today 20 minutes  Greater than 50% of total time was spent with the patient and / or family counseling and / or coordination of care  ** Please Note: Dragon 360 Dictation voice to text software may have been used in the creation of this document   **

## 2022-03-11 NOTE — PROGRESS NOTES
Patient seen and examined  Briefly this is a 78-year-old male with multifactorial cervical spinal stenosis and myelopathy  I have recommended that he undergo a combined anterior and posterior procedure at C5-6 and C6-7 anteriorly and at C3 through T1 posteriorly  The risks, benefits and complications of surgery were explained in detail to Amanda Dubois and including hemorrhage, infection, CSF leakage, wound problems, pain, weakness, numbness, dysesthesiae, paralysis, coma, and death  Also, the possibility  of further surgery being required was emphasized  Other potential medical complications were outlined, including deep venous thrombosis, pulmonary embolism, pneumonia, urinary tract infection, myocardial infarction,  and stroke  The need for physical therapy, occupational therapy, and rehabilitation was also mentioned  The alternatives to surgery were discussed  Amanda Dubois and asked relevant questions and asked that we proceed with arrangements for surgery  I spoke with the patient's wife concerning the risks possibility of complication and general nature of the procedure  I accented the risk of hemorrhage and thrombosis secondary to his factor 5 Leiden deficiency  I answered all of her questions to her satisfaction  She desires that we proceed

## 2022-03-11 NOTE — QUICK NOTE
Discussed code status with patient and his spouse who was present on the phone during the time of the conversation  Patient continues to stay that he wishes to be DNR DNI  He understands that the DNI order will need to be rescinded prior to procedure if he requires intubation and reinstated after the procedure  He agrees and also does his wife

## 2022-03-11 NOTE — PLAN OF CARE
Problem: MOBILITY - ADULT  Goal: Maintain or return to baseline ADL function  Description: INTERVENTIONS:  -  Assess patient's ability to carry out ADLs; assess patient's baseline for ADL function and identify physical deficits which impact ability to perform ADLs (bathing, care of mouth/teeth, toileting, grooming, dressing, etc )  - Assess/evaluate cause of self-care deficits   - Assess range of motion  - Assess patient's mobility; develop plan if impaired  - Assess patient's need for assistive devices and provide as appropriate  - Encourage maximum independence but intervene and supervise when necessary  - Involve family in performance of ADLs  - Assess for home care needs following discharge   - Consider OT consult to assist with ADL evaluation and planning for discharge  - Provide patient education as appropriate  Outcome: Progressing  Goal: Maintains/Returns to pre admission functional level  Description: INTERVENTIONS:  - Perform BMAT or MOVE assessment daily    - Set and communicate daily mobility goal to care team and patient/family/caregiver     - Collaborate with rehabilitation services on mobility goals if consulted  - Out of bed for toileting  - Record patient progress and toleration of activity level   Outcome: Progressing     Problem: PAIN - ADULT  Goal: Verbalizes/displays adequate comfort level or baseline comfort level  Description: Interventions:  - Encourage patient to monitor pain and request assistance  - Assess pain using appropriate pain scale  - Administer analgesics based on type and severity of pain and evaluate response  - Implement non-pharmacological measures as appropriate and evaluate response  - Consider cultural and social influences on pain and pain management  - Notify physician/advanced practitioner if interventions unsuccessful or patient reports new pain  Outcome: Progressing     Problem: INFECTION - ADULT  Goal: Absence or prevention of progression during hospitalization  Description: INTERVENTIONS:  - Assess and monitor for signs and symptoms of infection  - Monitor lab/diagnostic results  - Monitor all insertion sites, i e  indwelling lines, tubes, and drains  - Monitor endotracheal if appropriate and nasal secretions for changes in amount and color  - [place] appropriate cooling/warming therapies per order  - Administer medications as ordered  - Instruct and encourage patient and family to use good hand hygiene technique  - Identify and instruct in appropriate isolation precautions for identified infection/condition  Outcome: Progressing  Goal: Absence of fever/infection during neutropenic period  Description: INTERVENTIONS:  - Monitor WBC    Outcome: Progressing     Problem: SAFETY ADULT  Goal: Maintain or return to baseline ADL function  Description: INTERVENTIONS:  -  Assess patient's ability to carry out ADLs; assess patient's baseline for ADL function and identify physical deficits which impact ability to perform ADLs (bathing, care of mouth/teeth, toileting, grooming, dressing, etc )  - Assess/evaluate cause of self-care deficits   - Assess range of motion  - Assess patient's mobility; develop plan if impaired  - Assess patient's need for assistive devices and provide as appropriate  - Encourage maximum independence but intervene and supervise when necessary  - Involve family in performance of ADLs  - Assess for home care needs following discharge   - Consider OT consult to assist with ADL evaluation and planning for discharge  - Provide patient education as appropriate  Outcome: Progressing  Goal: Maintains/Returns to pre admission functional level  Description: INTERVENTIONS:  - Perform BMAT or MOVE assessment daily    - Set and communicate daily mobility goal to care team and patient/family/caregiver  - Collaborate with rehabilitation services on mobility goals if consulted  - Perform Range of Motion 3 times a day    - Reposition patient every 2 hours   - Dangle patient 3 times a day  - Stand patient 3 times a day  - Ambulate patient 3 times a day  - Out of bed for toileting  - Record patient progress and toleration of activity level   Outcome: Progressing  Goal: Patient will remain free of falls  Description: INTERVENTIONS:  - Educate patient/family on patient safety including physical limitations  - Instruct patient to call for assistance with activity   - Consult OT/PT to assist with strengthening/mobility   - Keep Call bell within reach  - Keep bed low and locked with side rails adjusted as appropriate  - Keep care items and personal belongings within reach  - Initiate and maintain comfort rounds  - Make Fall Risk Sign visible to staff  - Apply yellow socks and bracelet for high fall risk patients  - Consider moving patient to room near nurses station  Outcome: Progressing     Problem: DISCHARGE PLANNING  Goal: Discharge to home or other facility with appropriate resources  Description: INTERVENTIONS:  - Identify barriers to discharge w/patient and caregiver  - Arrange for needed discharge resources and transportation as appropriate  - Identify discharge learning needs (meds, wound care, etc )  - Arrange for interpretive services to assist at discharge as needed  - Refer to Case Management Department for coordinating discharge planning if the patient needs post-hospital services based on physician/advanced practitioner order or complex needs related to functional status, cognitive ability, or social support system  Outcome: Progressing     Problem: Knowledge Deficit  Goal: Patient/family/caregiver demonstrates understanding of disease process, treatment plan, medications, and discharge instructions  Description: Complete learning assessment and assess knowledge base    Interventions:  - Provide teaching at level of understanding  - Provide teaching via preferred learning methods  Outcome: Progressing     Problem: Neurological Deficit  Goal: Neurological status is stable or improving  Description: Interventions:  - Monitor and assess patient's level of consciousness, motor function, sensory function, and level of assistance needed for ADLs  - Monitor and report changes from baseline  Collaborate with interdisciplinary team to initiate plan and implement interventions as ordered  - Provide and maintain a safe environment  - Consider seizure precautions  - Consider fall precautions  - Consider aspiration precautions  - Consider bleeding precautions  Outcome: Progressing     Problem: Activity Intolerance/Impaired Mobility  Goal: Mobility/activity is maintained at optimum level for patient  Description: Interventions:  - Assess and monitor patient  barriers to mobility and need for assistive/adaptive devices  - Assess patient's emotional response to limitations  - Collaborate with interdisciplinary team and initiate plans and interventions as ordered  - Encourage independent activity per ability   - Maintain proper body alignment  - Perform active/passive rom as tolerated/ordered  - Plan activities to conserve energy   - Turn patient as appropriate  Outcome: Progressing     Problem: Communication Impairment  Goal: Ability to express needs and understand communication  Description: Assess patient's communication skills and ability to understand information  Patient will demonstrate use of effective communication techniques, alternative methods of communication and understanding even if not able to speak  - Encourage communication and provide alternate methods of communication as needed  - Collaborate with case management/ for discharge needs  - Include patient/family/caregiver in decisions related to communication  Outcome: Progressing     Problem: Potential for Aspiration  Goal: Non-ventilated patient's risk of aspiration is minimized  Description: Assess and monitor vital signs, respiratory status, and labs (WBC)    Monitor for signs of aspiration (tachypnea, cough, rales, wheezing, cyanosis, fever)  - Assess and monitor patient's ability to swallow  - Place patient up in chair to eat if possible  - HOB up at 90 degrees to eat if unable to get patient up into chair   - Supervise patient during oral intake  - Instruct patient/ family to take small bites  - Instruct patient/ family to take small single sips when taking liquids  - Follow patient-specific strategies generated by speech pathologist   Outcome: Progressing  Goal: Ventilated patient's risk of aspiration is minimized  Description: Assess and monitor vital signs, respiratory status, airway cuff pressure, and labs (WBC)  Monitor for signs of aspiration (tachypnea, cough, rales, wheezing, cyanosis, fever)  - Elevate head of bed 30 degrees if patient has tube feeding   - Monitor tube feeding  Outcome: Progressing     Problem: Nutrition  Goal: Nutrition/Hydration status is improving  Description: Monitor and assess patient's nutrition/hydration status for malnutrition (ex- brittle hair, bruises, dry skin, pale skin and conjunctiva, muscle wasting, smooth red tongue, and disorientation)  Collaborate with interdisciplinary team and initiate plan and interventions as ordered  Monitor patient's weight and dietary intake as ordered or per policy  Utilize nutrition screening tool and intervene per policy  Determine patient's food preferences and provide high-protein, high-caloric foods as appropriate  - Assist patient with eating   - Allow adequate time for meals   - Encourage patient to take dietary supplement as ordered  - Collaborate with clinical nutritionist   - Include patient/family/caregiver in decisions related to nutrition    Outcome: Progressing     Problem: Nutrition/Hydration-ADULT  Goal: Nutrient/Hydration intake appropriate for improving, restoring or maintaining nutritional needs  Description: Monitor and assess patient's nutrition/hydration status for malnutrition  Collaborate with interdisciplinary team and initiate plan and interventions as ordered  Monitor patient's weight and dietary intake as ordered or per policy  Utilize nutrition screening tool and intervene as necessary  Determine patient's food preferences and provide high-protein, high-caloric foods as appropriate       INTERVENTIONS:  - Monitor oral intake, urinary output, labs, and treatment plans  - Assess nutrition and hydration status and recommend course of action  - Evaluate amount of meals eaten  - Assist patient with eating if necessary   - Allow adequate time for meals  - Recommend/ encourage appropriate diets, oral nutritional supplements, and vitamin/mineral supplements  - Order, calculate, and assess calorie counts as needed  - Recommend, monitor, and adjust tube feedings and TPN/PPN based on assessed needs  - Assess need for intravenous fluids  - Provide specific nutrition/hydration education as appropriate  - Include patient/family/caregiver in decisions related to nutrition  Outcome: Progressing     Problem: Potential for Falls  Goal: Patient will remain free of falls  Description: INTERVENTIONS:  - Educate patient/family on patient safety including physical limitations  - Instruct patient to call for assistance with activity   - Consult OT/PT to assist with strengthening/mobility   - Keep Call bell within reach  - Keep bed low and locked with side rails adjusted as appropriate  - Keep care items and personal belongings within reach  - Initiate and maintain comfort rounds  - Make Fall Risk Sign visible to staff  - Apply yellow socks and bracelet for high fall risk patients  - Consider moving patient to room near nurses station  Outcome: Progressing

## 2022-03-12 LAB
ANION GAP SERPL CALCULATED.3IONS-SCNC: 6 MMOL/L (ref 4–13)
APTT PPP: 21 SECONDS (ref 23–37)
BUN SERPL-MCNC: 18 MG/DL (ref 5–25)
CALCIUM SERPL-MCNC: 9.3 MG/DL (ref 8.3–10.1)
CHLORIDE SERPL-SCNC: 102 MMOL/L (ref 100–108)
CO2 SERPL-SCNC: 29 MMOL/L (ref 21–32)
CREAT SERPL-MCNC: 0.96 MG/DL (ref 0.6–1.3)
ERYTHROCYTE [DISTWIDTH] IN BLOOD BY AUTOMATED COUNT: 12.8 % (ref 11.6–15.1)
GFR SERPL CREATININE-BSD FRML MDRD: 79 ML/MIN/1.73SQ M
GLUCOSE SERPL-MCNC: 115 MG/DL (ref 65–140)
GLUCOSE SERPL-MCNC: 125 MG/DL (ref 65–140)
GLUCOSE SERPL-MCNC: 136 MG/DL (ref 65–140)
GLUCOSE SERPL-MCNC: 154 MG/DL (ref 65–140)
GLUCOSE SERPL-MCNC: 156 MG/DL (ref 65–140)
HCT VFR BLD AUTO: 37.4 % (ref 36.5–49.3)
HGB BLD-MCNC: 13.2 G/DL (ref 12–17)
INR PPP: 1.14 (ref 0.84–1.19)
MCH RBC QN AUTO: 30.8 PG (ref 26.8–34.3)
MCHC RBC AUTO-ENTMCNC: 35.3 G/DL (ref 31.4–37.4)
MCV RBC AUTO: 87 FL (ref 82–98)
PLATELET # BLD AUTO: 182 THOUSANDS/UL (ref 149–390)
PMV BLD AUTO: 10.2 FL (ref 8.9–12.7)
POTASSIUM SERPL-SCNC: 3.8 MMOL/L (ref 3.5–5.3)
PROTHROMBIN TIME: 14.1 SECONDS (ref 11.6–14.5)
RBC # BLD AUTO: 4.28 MILLION/UL (ref 3.88–5.62)
SODIUM SERPL-SCNC: 137 MMOL/L (ref 136–145)
WBC # BLD AUTO: 13.85 THOUSAND/UL (ref 4.31–10.16)

## 2022-03-12 PROCEDURE — 85610 PROTHROMBIN TIME: CPT | Performed by: PHYSICIAN ASSISTANT

## 2022-03-12 PROCEDURE — 99024 POSTOP FOLLOW-UP VISIT: CPT | Performed by: NURSE PRACTITIONER

## 2022-03-12 PROCEDURE — 99232 SBSQ HOSP IP/OBS MODERATE 35: CPT | Performed by: INTERNAL MEDICINE

## 2022-03-12 PROCEDURE — 97163 PT EVAL HIGH COMPLEX 45 MIN: CPT

## 2022-03-12 PROCEDURE — 97167 OT EVAL HIGH COMPLEX 60 MIN: CPT

## 2022-03-12 PROCEDURE — 85027 COMPLETE CBC AUTOMATED: CPT | Performed by: INTERNAL MEDICINE

## 2022-03-12 PROCEDURE — 82948 REAGENT STRIP/BLOOD GLUCOSE: CPT

## 2022-03-12 PROCEDURE — 85730 THROMBOPLASTIN TIME PARTIAL: CPT | Performed by: PHYSICIAN ASSISTANT

## 2022-03-12 PROCEDURE — 80048 BASIC METABOLIC PNL TOTAL CA: CPT | Performed by: INTERNAL MEDICINE

## 2022-03-12 PROCEDURE — 97110 THERAPEUTIC EXERCISES: CPT

## 2022-03-12 RX ORDER — HYDRALAZINE HYDROCHLORIDE 50 MG/1
50 TABLET, FILM COATED ORAL EVERY 8 HOURS PRN
Status: DISCONTINUED | OUTPATIENT
Start: 2022-03-12 | End: 2022-03-12

## 2022-03-12 RX ORDER — AMLODIPINE BESYLATE 5 MG/1
5 TABLET ORAL 2 TIMES DAILY
Status: DISCONTINUED | OUTPATIENT
Start: 2022-03-12 | End: 2022-03-18 | Stop reason: HOSPADM

## 2022-03-12 RX ORDER — HEPARIN SODIUM 5000 [USP'U]/ML
5000 INJECTION, SOLUTION INTRAVENOUS; SUBCUTANEOUS EVERY 8 HOURS SCHEDULED
Status: DISCONTINUED | OUTPATIENT
Start: 2022-03-12 | End: 2022-03-14

## 2022-03-12 RX ORDER — HYDRALAZINE HYDROCHLORIDE 20 MG/ML
10 INJECTION INTRAMUSCULAR; INTRAVENOUS EVERY 8 HOURS PRN
Status: DISCONTINUED | OUTPATIENT
Start: 2022-03-12 | End: 2022-03-18 | Stop reason: HOSPADM

## 2022-03-12 RX ORDER — HYDRALAZINE HYDROCHLORIDE 25 MG/1
25 TABLET, FILM COATED ORAL EVERY 8 HOURS PRN
Status: DISCONTINUED | OUTPATIENT
Start: 2022-03-12 | End: 2022-03-12

## 2022-03-12 RX ORDER — HYDRALAZINE HYDROCHLORIDE 50 MG/1
50 TABLET, FILM COATED ORAL EVERY 8 HOURS SCHEDULED
Status: DISCONTINUED | OUTPATIENT
Start: 2022-03-12 | End: 2022-03-18 | Stop reason: HOSPADM

## 2022-03-12 RX ADMIN — HYDRALAZINE HYDROCHLORIDE 50 MG: 50 TABLET ORAL at 21:49

## 2022-03-12 RX ADMIN — METHOCARBAMOL 500 MG: 500 TABLET, FILM COATED ORAL at 17:18

## 2022-03-12 RX ADMIN — HEPARIN SODIUM 5000 UNITS: 5000 INJECTION INTRAVENOUS; SUBCUTANEOUS at 21:49

## 2022-03-12 RX ADMIN — ACETAMINOPHEN 325MG 975 MG: 325 TABLET ORAL at 06:20

## 2022-03-12 RX ADMIN — DOCUSATE SODIUM 100 MG: 100 CAPSULE ORAL at 08:52

## 2022-03-12 RX ADMIN — METOPROLOL SUCCINATE 100 MG: 100 TABLET, EXTENDED RELEASE ORAL at 08:57

## 2022-03-12 RX ADMIN — ACETAMINOPHEN 325MG 975 MG: 325 TABLET ORAL at 21:48

## 2022-03-12 RX ADMIN — METHOCARBAMOL 500 MG: 500 TABLET, FILM COATED ORAL at 11:30

## 2022-03-12 RX ADMIN — METHOCARBAMOL 500 MG: 500 TABLET, FILM COATED ORAL at 06:21

## 2022-03-12 RX ADMIN — AMLODIPINE BESYLATE 5 MG: 5 TABLET ORAL at 08:52

## 2022-03-12 RX ADMIN — HEPARIN SODIUM 5000 UNITS: 5000 INJECTION INTRAVENOUS; SUBCUTANEOUS at 15:11

## 2022-03-12 RX ADMIN — OXYCODONE HYDROCHLORIDE 5 MG: 5 TABLET ORAL at 18:39

## 2022-03-12 RX ADMIN — AMLODIPINE BESYLATE 5 MG: 5 TABLET ORAL at 21:49

## 2022-03-12 RX ADMIN — DOCUSATE SODIUM 100 MG: 100 CAPSULE ORAL at 17:18

## 2022-03-12 RX ADMIN — PANTOPRAZOLE SODIUM 40 MG: 40 TABLET, DELAYED RELEASE ORAL at 06:21

## 2022-03-12 RX ADMIN — FLECAINIDE ACETATE 100 MG: 100 TABLET ORAL at 17:19

## 2022-03-12 RX ADMIN — HYDRALAZINE HYDROCHLORIDE 25 MG: 25 TABLET, FILM COATED ORAL at 11:30

## 2022-03-12 RX ADMIN — HYDRALAZINE HYDROCHLORIDE 10 MG: 20 INJECTION, SOLUTION INTRAMUSCULAR; INTRAVENOUS at 16:25

## 2022-03-12 RX ADMIN — DEXAMETHASONE 4 MG: 4 TABLET ORAL at 06:21

## 2022-03-12 RX ADMIN — ACETAMINOPHEN 325MG 975 MG: 325 TABLET ORAL at 15:11

## 2022-03-12 RX ADMIN — STANDARDIZED SENNA CONCENTRATE 8.6 MG: 8.6 TABLET ORAL at 08:52

## 2022-03-12 RX ADMIN — METHOCARBAMOL 500 MG: 500 TABLET, FILM COATED ORAL at 23:02

## 2022-03-12 RX ADMIN — HYDRALAZINE HYDROCHLORIDE 50 MG: 50 TABLET, FILM COATED ORAL at 15:23

## 2022-03-12 RX ADMIN — FLECAINIDE ACETATE 100 MG: 100 TABLET ORAL at 08:53

## 2022-03-12 RX ADMIN — DULOXETINE HYDROCHLORIDE 60 MG: 60 CAPSULE, DELAYED RELEASE ORAL at 21:49

## 2022-03-12 RX ADMIN — DEXAMETHASONE 4 MG: 4 TABLET ORAL at 21:49

## 2022-03-12 RX ADMIN — DEXAMETHASONE 4 MG: 4 TABLET ORAL at 15:10

## 2022-03-12 RX ADMIN — ATORVASTATIN CALCIUM 40 MG: 40 TABLET, FILM COATED ORAL at 17:18

## 2022-03-12 NOTE — PHYSICAL THERAPY NOTE
Physical Therapy Evaluation     Patient's Name: Brunilda Blancas    Admitting Diagnosis  Cervical myelopathy ([facility] ) [G95 9]    Problem List  Patient Active Problem List   Diagnosis    Status post catheter ablation of atrial flutter    Dyslipidemia    Essential hypertension    WILL (obstructive sleep apnea)    Factor V Leiden mutation ([facility] )    Erectile dysfunction    Depression, major, single episode, moderate (HCC)    Insomnia    Lumbar herniated disc    Primary osteoarthritis of left knee    Primary osteoarthritis of right knee    Paroxysmal A-fib ([facility] )    Lower extremity edema    Mixed hyperlipidemia    History of DVT of lower extremity    Memory difficulty    IFG (impaired fasting glucose)    Pes anserinus bursitis of right knee    Ambulatory dysfunction    Weakness    Cervical myelopathy (HCC)    Sinus bradycardia    Goals of care, counseling/discussion       Past Medical History  Past Medical History:   Diagnosis Date    Acute venous embolism and thrombosis of deep vessels of proximal lower extremity (HCC)     Last assessed: 5/18/15    Arthritis     Cellulitis     LE    CPAP (continuous positive airway pressure) dependence     Factor V Leiden ([facility] )     Forgetfulness     [place] (hard of hearing)     Paroxysmal atrial fibrillation (HCC)     Last assessed: 11/2/15    Sleep apnea        Past Surgical History  Past Surgical History:   Procedure Laterality Date    BACK SURGERY      Lower    COLON SURGERY      COLONOSCOPY            03/12/22 0851   PT Last Visit   PT Visit Date 03/12/22   Note Type   Note type Evaluation   Pain Assessment   Pain Assessment Tool 0-10   Pain Score No Pain  (discomfort in neck)   Restrictions/Precautions   Weight Bearing Precautions Per Order No   Braces or Orthoses C/S Collar   Other Precautions Spinal precautions; Chair Alarm; Bed Alarm;Multiple lines;Telemetry; Fall Risk;Pain   Home Living   Type of Home House   Home Layout Multi-level; Able to live on main level with bedroom/bathroom  (4+1STE)   Bathroom Shower/Tub Walk-in shower   Bathroom Toilet Standard   Bathroom Equipment Commode   Home Equipment Walker;Cane  (reports use of SPC last 3 days)   Prior Function   Level of [place] Independent with ADLs and functional mobility   Lives With Spouse   ADL Assistance Independent   IADLs Independent   Falls in the last 6 months 0   Vocational Retired   Comments Pt reports wife in unable to assist due to illness and he at times needs to assist her  General   Family/Caregiver Present No   Cognition   Overall Cognitive Status WFL   Arousal/Participation Alert   Attention Within functional limits   Orientation Level Oriented X4   Memory Decreased recall of precautions   Following Commands Follows one step commands with increased time or repetition   Comments Pt reports he still feels kind of out of it   Subjective   Subjective Pt pleasant and agreeable to participate   RLE Assessment   RLE Assessment   (grossly 4-/5)   LLE Assessment   LLE Assessment   (grossly 4-/5)   Bed Mobility   Supine to Sit 5  Supervision   Additional items HOB elevated; Increased time required   Additional Comments Supine in bed upon PT arrival   Pt left upright in bedside chair with chair alarm intact and all needs in reach  Transfers   Sit to Stand 5  Supervision   Additional items Verbal cues; Increased time required   Stand to Sit 5  Supervision   Additional items Verbal cues; Increased time required   Additional Comments Transfers with RW   VC for hand placement and safety  Ambulation/Elevation   Gait pattern Excessively slow; Improper Weight shift; Ataxia  (unsteady)   Gait Assistance 4  Minimal assist   Additional items Assist x 1;Verbal cues; Tactile cues   Assistive Device Rolling walker   Distance 35 ft x 1   Balance   Static Sitting Fair +   Dynamic Sitting Fair   Static Standing Fair -   Dynamic Standing Poor +   Ambulatory Poor +   Endurance Deficit   Endurance Deficit Yes   Endurance Deficit Description fatigue, weakness   Activity Tolerance   Activity Tolerance Patient limited by fatigue   Medical Staff Made Aware OT, OTS   Nurse Made Aware RN cleared pt to be seen by PT   Assessment   Prognosis Good   Problem List Decreased strength;Decreased endurance; Impaired balance;Decreased mobility; Decreased safety awareness;Orthopedic restrictions;Pain   Assessment Pt seen for high complexity PT evaluation due to decrease in functional mobility status compared to baseline  Pt with active PT eval/treat and ambulate orders at this time  Pt is a 71 y o  M who presented to [facility] with b/l LE weakness, neck pain, and numbness/tingling on 3/5/22  Pt primary dx is cervical myelopathy  Pt is s/p anterior cervical discectomy and fixation fusion C5/C6 and C6/C7, posterior cervical decompression and fusion C3-T1 on 3/11/22  Pt  has a past medical history of Acute venous embolism and thrombosis of deep vessels of proximal lower extremity ([facility] ), Arthritis, Cellulitis, CPAP (continuous positive airway pressure) dependence, Factor V Leiden ([facility] ), Forgetfulness, [place] (hard of hearing), Paroxysmal atrial fibrillation ([facility] ), and Sleep apnea  Pt resides with spouse in [facility] with 4+1STE  Pt presents with decreased strength, balance, endurance that contribute to limitations in bed mobility, functional transfers, functional mobility  Pt requires Min A for mobility at this time  Pt legt upright in bedside chair with chair alarm intact and with all needs in reach  Pt will benefit from skilled therapy in order to address current impairments and functional limitations  PT to follow pt and recommending rehab once medically cleared  The patient's AM-PAC Basic Mobility Inpatient Short Form Raw Score is 18  A Raw score of greater than 16 suggests the patient may benefit from discharge to home  Please also refer to the recommendation of the Physical Therapist for safe discharge planning    Based on pt current functional abilities, recommend rehab though pt may progress to DC home  Barriers to Discharge Inaccessible home environment;Decreased caregiver support   Goals   Patient Goals to feel better   STG Expiration Date 03/26/22   Short Term Goal #1 1  Pt will demonstrate ability to perform all aspects of bed mobility with I in order to increase independence and decrease burden on caregivers  2  Pt will demonstrate ability to perform functional transfers with Mod I in order to increase independence and decrease burden on caregivers  3  Pt will demonstrate ability to ambulate 200 ft with least restrictive AD with Mod I in order to return to mobility safely  4  Pt will demonstrate ability to negotiate full flight steps with/without HR and Mod I in order to return to household/community mobility safely  5  Pt will demonstrate improved balance by one grade order to decrease risk of falls  6  Pt will increase b/l LE strength by 1 grade in order to increase ease of functional mobility and transfers  Plan   Treatment/Interventions Functional transfer training;LE strengthening/ROM; Therapeutic exercise; Endurance training;Patient/family training;Equipment eval/education; Bed mobility;Gait training;Elevations; Spoke to nursing   PT Frequency 3-5x/wk   Recommendation   PT Discharge Recommendation Post acute rehabilitation services   Additional Comments PT cleared pt to DC once medically cleared   AM-PAC Basic Mobility Inpatient   Turning in Bed Without Bedrails 3   Lying on Back to Sitting on Edge of Flat Bed 3   Moving Bed to Chair 3   Standing Up From Chair 3   Walk in Room 3   Climb 3-5 Stairs 3   Basic Mobility Inpatient Raw Score 18   Basic Mobility Standardized Score 41 05   Highest Level Of Mobility   JH-HLM Goal 6: Walk 10 steps or more   JH-HLM Highest Level of Mobility 7: Walk 25 feet or more   JH-HLM Goal Achieved Yes   Modified [place] Scale   Modified [place] Scale 4   Additional Treatment Session   Start Time 5814 End Time 0851   Treatment Assessment Pt agreeable to participate in LE therex  Pt educated on and performed the following LE therex in sitting: hip flexion, ankle pumps, knee extension (b/l 10x1)  Pt provided with HEP handout and demonstrates good understanding, tolerates well           Talbert Pool, PT, DPT

## 2022-03-12 NOTE — PLAN OF CARE
Problem: OCCUPATIONAL THERAPY ADULT  Goal: Performs self-care activities at highest level of function for planned discharge setting  See evaluation for individualized goals  Description: Treatment Interventions: ADL retraining,Functional transfer training,Endurance training,Continued evaluation,Energy conservation,Activityengagement,Compensatory technique education          See flowsheet documentation for full assessment, interventions and recommendations  Note: Limitation: Decreased ADL status,Decreased endurance,Decreased self-care trans,Decreased high-level ADLs  Prognosis: Fair  Assessment: Pt is 71 y o  male admitted to [facility] on 3/5/2022 w/ cervical myelopathy  Pt received "Anterior cervical discectomy and fixation fusion C5/6 and C6/7; Posterior cervical decompression and instrumented fusion C3-T1" on 3/11/2022  Pt  has a past medical history of Acute venous embolism and thrombosis of deep vessels of proximal lower extremity ([facility] ), Arthritis, Cellulitis, CPAP (continuous positive airway pressure) dependence, Factor V Leiden ([facility] ), Forgetfulness, [place] (hard of hearing), Paroxysmal atrial fibrillation ([facility] ), and Sleep apnea  Pt with active OT orders and activity orders  Pt resides in a 3 story Home, with spouse  Pt has 5 MICHELLE with no rail and has supportive children but A with his wife needs PTA  Pt was I w  ADLs, IADLs, (+) drove  Pt is currently functioning at S for eating, bed mobility, and transfers  Pt requires mod A for UB and Max A for LB ADLs  Pt is limited 2* pain, endurance, activity tolerance, functional mobility, functional standing tolerance and decreased I w/ ADLS/IADLS  The Areas of Occupational Performance Areas to address w/ pt include: eating, grooming, bathing/shower, toilet hygiene, dressing, health maintenance and functional mobility  OT recommendation for d/c includes post acute rehab   Pt would benefit from continued acute OT services for  3-5x/week to  w/in 10-14 days:  to address functional goals         OT Discharge Recommendation: Post acute rehabilitation services  OT - OK to Discharge: Yes (when medically appropriate)

## 2022-03-12 NOTE — PROGRESS NOTES
[address] [facility]  Progress Note - Alexandria Bernard 1950, 71 y o  male MRN: [id number]  Unit/Bed#: [facility] 805-88 Encounter: [id number]  Primary Care Provider: Marshall Fernandez MD   Date and time admitted to hospital: 3/5/2022  8:18 PM    * Cervical myelopathy ([address] )  Assessment & Plan  POD 1 Anterior cervical discectomy and fixation fusion C5/6 and C6/7; Posterior cervical decompression and instrumented fusion C3-T1  · Pre op- severe canal stenosis C5-6 with small area of signal abnormality   Presented with significant decline in ambulation along with numbness and weakness R>L over the last week  Reports multiple falls  Imaging:    Cervical spine x-ray 03/11/2022:ACDF C5-C7 and posterior fusion hardware from C3 through T1 as above  Plan:   Continue monitor neurological exam and symptoms   Reviewed imaging with patient   [place] collar to remain in place at all times, okay for [place] collar for showering   Continue Decadron 48hr taper   Given 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, prior anticoagulation (Coumadin) is currently on hold  Discussed with makenzie Lobato to start heparin drip without bolus on POD 3    Medical management and pain control per primary team   Drains:  o Anterior MARCIN drain with 110 mL since OR with serosanguineous output, will maintain at this time  o Posterior MARCIN drain with 245ml since OR with serosanguineous output, will maintain at this time  · Patient tolerating diet with no complaints with swallowing or with breathing  He does complain of a dry mouth  · Mobilize with PT/OT  · Encourage incentive spirometer  · DVT PPX: SCDs    Neurosurgery will continue to follow closely, call with any further questions or concerns  Subjective/Objective     Chief Complaint: "I feel better today"    Subjective:  Patient denies any neck pain but does report some posterior neck discomfort    He denies any swallowing or breathing problems but does report a dry mouth  He states his right upper extremity strength has improved  He denies any headaches, dizziness, blurry vision, chest pain, shortness of breath, abdominal pain, nausea, vomiting, diarrhea, no problems with bowel or bladder, no new weakness or numbness/tingling  Objective:  Patient comfortably lying in bed with vista collar in place, NAD  I/O       03/10 0701  03/11 0700 03/11 0701  03/12 0700 03/12 0701 03/13 0700    P  O   240     I V  (mL/kg)  2300 (20 5)     IV Piggyback  500     Total Intake(mL/kg)  3040 (27 1)     Urine (mL/kg/hr) 2250 1075 (0 4)     Drains  355     Blood  200     Total Output 2250 1630     Net -2250 +1410                  Invasive Devices  Report    Peripheral Intravenous Line            Peripheral IV 03/08/22 Left Forearm 4 days    Peripheral IV 03/11/22 Left Antecubital <1 day          Drain            Closed/Suction Drain Anterior Neck 7 Fr  <1 day    Closed/Suction Drain Posterior Neck Bulb 7 Fr  <1 day                Physical Exam:  Vitals: Blood pressure (!) 171/94, pulse 68, temperature 97 9 °F (36 6 °C), resp  rate 16, weight 112 kg (247 lb 5 7 oz), SpO2 97 %  ,Body mass index is 34 5 kg/m²  General appearance: alert, appears stated age, cooperative and no distress  Head: Normocephalic, without obvious abnormality, atraumatic  Eyes: EOMI, PERRL, conjugate gaze  Neck:  Vista collar in place, anterior dressing clean, dry, intact    Posterior dressing clean, dry, intact, 2 MARCIN drains to FS with serosanguineous output, will maintain at this time  Lungs: non labored breathing  Heart: regular heart rate  Neurologic:   Mental status: Alert, oriented x3, thought content appropriate, speech is clear, following commands  Cranial nerves: grossly intact (Cranial nerves II-XII)  Sensory: normal to LT in all extremities x4, JPS and DST intact  Motor: moving all extremities, strength 5/5 throughout except right  4/5 and right HF 4/5  Reflexes: 2+ and symmetric, right-sided Jiménez's noted and bilateral clonus  Coordination: finger to nose normal bilaterally, no drift bilaterally      Lab Results:  Results from last 7 days   Lab Units 03/12/22  0638 03/11/22  0611 03/10/22  0516 03/08/22  0535 03/08/22  0535 03/07/22  0512 03/07/22  0512 03/06/22  1326 03/06/22  0448   WBC Thousand/uL 13 85* 10 06 9 40   < > 9 58   < > 7 20   < > 4 29*   HEMOGLOBIN g/dL 13 2 14 6 13 9   < > 13 1   < > 13 4   < > 14 0   HEMATOCRIT % 37 4 41 7 39 7   < > 36 9   < > 41 1   < > 40 2   PLATELETS Thousands/uL 182 237 237   < > 215   < > 220   < > 191   NEUTROS PCT %  --   --   --   --  87*  --  82*  --  46   MONOS PCT %  --   --   --   --  2*  --  5  --  12    < > = values in this interval not displayed  Results from last 7 days   Lab Units 03/12/22  0639 03/11/22  0611 03/10/22  0232 03/08/22  0535 03/08/22  0535 03/07/22  0512 03/07/22  0512   POTASSIUM mmol/L 3 8 3 6 3 8   < > 3 8   < > 3 6   CHLORIDE mmol/L 102 102 104   < > 107   < > 106   CO2 mmol/L 29 26 30   < > 26   < > 24   BUN mg/dL 18 20 17   < > 13   < > 13   CREATININE mg/dL 0 96 0 88 0 87   < > 0 82   < > 0 68   CALCIUM mg/dL 9 3 9 7 9 5   < > 9 4   < > 9 3   ALK PHOS U/L  --   --   --   --  57  --  62   ALT U/L  --   --   --   --  18  --  18   AST U/L  --   --   --   --  16  --  16    < > = values in this interval not displayed  Results from last 7 days   Lab Units 03/10/22  0232   MAGNESIUM mg/dL 2 0         Results from last 7 days   Lab Units 03/12/22  0639 03/11/22  0611 03/10/22  2013 03/10/22  0948 03/10/22  0516 03/10/22  0516   INR  1 14 1 04  --   --   --  1 22*   PTT seconds 21*  --  45* 147*   < > 157*    < > = values in this interval not displayed  No results found for: TROPONINT  ABG:No results found for: PHART, AKM9TQU, PO2ART, KNR8UHI, K2UKXSUV, BEART, SOURCE    Imaging Studies: I have personally reviewed pertinent reports     and I have personally reviewed pertinent films in PACS    MRI brain wo contrast    Result Date: 3/5/2022  Impression: No acute intracranial pathology  Mild chronic microangiopathy  Workstation performed: [id number]     MRI cervical spine w wo contrast    Result Date: 3/5/2022  Impression: No acute abnormality  Motion limited    Multilevel degenerative spondylosis progressed from 2013 most severe at C5-6 with severe canal stenosis and flattening of the cord  Small focus of T2 signal within the cord at C5-C6 may represent myelomalacia  Moderate canal stenosis at C3-4 mildly indents the cord without signal abnormality  Marked multilevel foraminal stenosis  No prevertebral edema or mass  Workstation performed: [id number]       EKG, Pathology, and Other Studies: I have personally reviewed pertinent reports          VTE Mechanical Prophylaxis: sequential compression device

## 2022-03-12 NOTE — RESTORATIVE TECHNICIAN NOTE
Restorative Technician Note      Patient Name: Antonio Chavez     Note Type: Bracing, Initial consult  Patient Position Upon Consult: Supine  Brace Applied: [place] Collar Set (LVL2)  Additional Brace Ordered: No  Patient Position When Brace Applied: Supine  Bracing Recommendations: None  Education Provided: Yes  Patient Position at End of Consult: Supine; All needs within reach  Nurse Communication: Nurse aware of consult, application of brace    Replacement pads, marybeth collar, and cervical handout at bedside  Pt denies questions at this time  Please call Mobility Coordinator at ext  [phone] in regards to bracing instruction and/or adjustment    Louise Figueroa Restorative Technician, BS

## 2022-03-12 NOTE — PERIOPERATIVE NURSING NOTE
Received pt to pacu 4  Initially pt appeared to be awake with his eyes open but no verbal response  He appeared restless but there was no command following  Right pupil slightly smaller than left  Dr Mike Epps was notified and examined the pt at the bedside  Pt subsequently was able to speak but had difficulty and was confused  He did follow simple commands but intermittently

## 2022-03-12 NOTE — ANESTHESIA POSTPROCEDURE EVALUATION
Post-Op Assessment Note    CV Status:  Stable  Pain Score: 0    Pain management: adequate     Mental Status:  Awake and sleepy   Hydration Status:  Stable   PONV Controlled:  None   Airway Patency:  Patent      Post Op Vitals Reviewed: Yes      Staff: Anesthesiologist, CRNA         No complications documented      BP   150/69   Temp  97   Pulse  82   Resp   16   SpO2   97

## 2022-03-12 NOTE — OCCUPATIONAL THERAPY NOTE
Occupational Therapy Evaluation     Patient Name: Enrique CUBA Date: 3/12/2022  Problem List  Principal Problem:    Cervical myelopathy ([facility] )  Active Problems:    Essential hypertension    WILL (obstructive sleep apnea)    Factor V Leiden mutation (HCC)    Paroxysmal A-fib (HCC)    Mixed hyperlipidemia    Ambulatory dysfunction    Sinus bradycardia    Goals of care, counseling/discussion    Past Medical History  Past Medical History:   Diagnosis Date    Acute venous embolism and thrombosis of deep vessels of proximal lower extremity (HCC)     Last assessed: 5/18/15    Arthritis     Cellulitis     LE    CPAP (continuous positive airway pressure) dependence     Factor V Leiden ([facility] )     Forgetfulness     [place] (hard of hearing)     Paroxysmal atrial fibrillation (HCC)     Last assessed: 11/2/15    Sleep apnea      Past Surgical History  Past Surgical History:   Procedure Laterality Date    BACK SURGERY      Lower    COLON SURGERY      COLONOSCOPY               03/12/22 0490   OT Last Visit   OT Visit Date 03/12/22   Note Type   Note type Evaluation   Restrictions/Precautions   Weight Bearing Precautions Per Order No   Braces or Orthoses C/S Collar   Other Precautions Spinal precautions; Telemetry;Multiple lines; Chair Alarm   Pain Assessment   Pain Assessment Tool 0-10   Pain Score No Pain   Home Living   Type of Home House   Home Layout Multi-level; Able to live on main level with bedroom/bathroom;Stairs to enter without rails  (5 MICHELLE)   Bathroom Shower/Tub Walk-in shower   Bathroom Toilet Standard   Bathroom Equipment Commode   Home Equipment Walker;Cane   Additional Comments Pt lives in a [address] [place] [address] with 5 MICHELLE with no rails      Prior Function   Level of [place] Independent with ADLs and functional mobility   Lives With Spouse   Receives Help From Family   ADL Assistance Independent   IADLs Independent   Falls in the last 6 months 0   Vocational Retired   Lifestyle   Autonomy Pt was I in ADLs, IADLs, and used a cane PTA  Pt A with his wife needs  (+)   Reciprocal Relationships Pt lives with his wife and A with her needs  Pt reports his wife is sick and is unable to A with his needs  Service to Others Pt is retired  Intrinsic Gratification Pt enjoys being with his dog  Psychosocial   Psychosocial (WDL) WDL   ADL   Where Assessed Chair   Eating Assistance 5  Supervision/Setup   Grooming Assistance 4  Minimal Assistance   UB Bathing Assistance 3  Moderate Assistance   LB Bathing Assistance 2  Maximal Assistance   UB Dressing Assistance 3  Moderate Assistance   LB Dressing Assistance 2  Maximal [address]  3  Moderate Assistance   Functional Assistance 2  Maximal Assistance   Bed Mobility   Supine to Sit 5  Supervision   Additional Comments Pt was able to sit EOB with no A  Transfers   Sit to Stand 5  Supervision   Stand to Sit 5  Supervision   Additional Comments Pt used RW to tx  Pt was left seated in chair with chair alarm on and all necessary items within reach  Functional Mobility   Functional Mobility 4  Minimal assistance   Additional Comments Ax1  Pt was able to demonstrate household mobility  Pt legs became fatigue and required chair ride back  Additional items Rolling walker   Balance   Static Sitting Fair +   Dynamic Sitting Fair   Static Standing Fair -   Dynamic Standing Poor +   Ambulatory Poor +   Activity Tolerance   Activity Tolerance Patient limited by fatigue;Patient tolerated treatment well   Medical Staff Made Aware Pt was seen with PT 2* medical complexity  Nurse Made Aware RN was made aware  Hand Function   Gross Motor Coordination Functional   Fine Motor Coordination Functional   Sensation   Light Touch No apparent deficits   Cognition   Overall Cognitive Status WFL   Arousal/Participation Responsive; Alert;Arousable; Cooperative   Attention Within functional limits   Orientation Level Oriented X4   Memory Within functional limits Following Commands Follows one step commands with increased time or repetition   Comments Pt was pleasant and cooperative t/o session  Assessment   Limitation Decreased ADL status; Decreased endurance;Decreased self-care trans;Decreased high-level ADLs   Prognosis Fair   Assessment Pt is 71 y o  male admitted [facility] on 3/5/2022 w/ cervical myelopathy  Pt received "Anterior cervical discectomy and fixation fusion C5/6 and C6/7; Posterior cervical decompression and instrumented fusion C3-T1" on 3/11/2022  Pt  has a past medical history of Acute venous embolism and thrombosis of deep vessels of proximal lower extremity ([facility] [facility] ), Arthritis, Cellulitis, CPAP (continuous positive airway pressure) dependence, Factor V Leiden ([facility] [address] [facility] ), Forgetfulness, [place] (hard of hearing), Paroxysmal atrial fibrillation ([facility] [address] ), and Sleep apnea  Pt with active OT orders and activity orders  Pt resides in a 3 story Home, with spouse  Pt has 5 MICHELLE with no rail and has supportive children but A with his wife needs PTA  Pt was I w  ADLs, IADLs, (+) drove  Pt is currently functioning at S for eating, bed mobility, and transfers  Pt requires mod A for UB and Max A for LB ADLs  Pt is limited 2* pain, endurance, activity tolerance, functional mobility, functional standing tolerance and decreased I w/ ADLS/IADLS  The Areas of Occupational Performance Areas to address w/ pt include: eating, grooming, bathing/shower, toilet hygiene, dressing, health maintenance and functional mobility  OT recommendation for d/c includes post acute rehab  Pt would benefit from continued acute OT services for  3-5x/week to  w/in 10-14 days:  to address functional goals  Goals   LT Time Frame 10-14   Long Term Goal see goals below   Plan   Treatment Interventions ADL retraining;Functional transfer training; Endurance training;Continued evaluation; Energy conservation; Activityengagement; Compensatory technique education   Goal Expiration Date 22   OT Frequency 3-5x/wk   Recommendation   OT Discharge Recommendation Post acute rehabilitation services   OT - OK to Discharge Yes  (when medically appropriate)   AM-PAC Daily Activity Inpatient   Lower Body Dressing 2   Bathing 2   Toileting 3   Upper Body Dressing 3   Grooming 3   Eating 4   Daily Activity Raw Score 17   Daily Activity Standardized Score (Calc for Raw Score >=11) 37 26   AM-PAC Applied Cognition Inpatient   Following a Speech/Presentation 4   Understanding Ordinary Conversation 4   Taking Medications 4   Remembering Where Things Are Placed or Put Away 4   Remembering List of 4-5 Errands 4   Taking Care of Complicated Tasks 4   Applied Cognition Raw Score 24   Applied Cognition Standardized Score 62 21   Modified [place] Scale   Modified [place] Scale 4       Goals    Pt w/ mod I will complete daily grooming tasks w/ adaptive equipment as needed  Pt will increase standing tolerance to 10 mins w/ mod I w/ adaptive equipment as needed  Pt will complete functional transfers w/ mod I on and off all surfaces w/ equipment as needed  Pt will complete functional mobility w/ mod I on and off all surfaces w/ equipment as needed  Pt will complete tolieting w/ mod I while demonstrating safety techniques w/ DME  Pt will complete feeding tasks w/ mod I using adaptive devices  Pt will demonstrate G safety awareness w/ mod I during OT session  Pt will demonstrate LE body dressing w/ mod I w/ adaptive equipment as needed  Pt will demonstrate UE body dressing w/ mod I w/ adaptive equipment as needed  Pt will increase activity tolerance to G during a 30 min OT tx session  Pt will demonstrate bed mobility skills w/ mod I  Pt will complete IADLs tasks w/ mod I  Pt will participate in a formal/functional cognitive assessment as needed to assist with safe discharge planning

## 2022-03-12 NOTE — PROGRESS NOTES
Nurse made Neurosurgery team aware of anterior MARCIN drain not functioning properly, holding minimal suction  Pt is asymptomatic and minimal drainage present around anterior site  N S  team to evaluate in the morning  Will continue to monitor overnight

## 2022-03-12 NOTE — PLAN OF CARE
Problem: PHYSICAL THERAPY ADULT  Goal: Performs mobility at highest level of function for planned discharge setting  See evaluation for individualized goals  Description: Treatment/Interventions: Functional transfer training,LE strengthening/ROM,Therapeutic exercise,Endurance training,Patient/family training,Equipment eval/education,Bed mobility,Gait training,Elevations,Spoke to nursing          See flowsheet documentation for full assessment, interventions and recommendations  Note: Prognosis: Good  Problem List: Decreased strength,Decreased endurance,Impaired balance,Decreased mobility,Decreased safety awareness,Orthopedic restrictions,Pain  Assessment: Pt seen for high complexity PT evaluation due to decrease in functional mobility status compared to baseline  Pt with active PT eval/treat and ambulate orders at this time  Pt is a 71 y o  M who presented to [facility] with b/l LE weakness, neck pain, and numbness/tingling on 3/5/22  Pt primary dx is cervical myelopathy  Pt is s/p anterior cervical discectomy and fixation fusion C5/C6 and C6/C7, posterior cervical decompression and fusion C3-T1 on 3/11/22  Pt  has a past medical history of Acute venous embolism and thrombosis of deep vessels of proximal lower extremity ([address] ), Arthritis, Cellulitis, CPAP (continuous positive airway pressure) dependence, Factor V Leiden ([address] ), Forgetfulness, [place] (hard of hearing), Paroxysmal atrial fibrillation ([address] ), and Sleep apnea  Pt resides with spouse in [facility] with 4+1STE  Pt presents with decreased strength, balance, endurance that contribute to limitations in bed mobility, functional transfers, functional mobility  Pt requires Min A for mobility at this time  Pt legt upright in bedside chair with chair alarm intact and with all needs in reach  Pt will benefit from skilled therapy in order to address current impairments and functional limitations   PT to follow pt and recommending rehab once medically cleared  The patient's AM-PAC Basic Mobility Inpatient Short Form Raw Score is 18  A Raw score of greater than 16 suggests the patient may benefit from discharge to home  Please also refer to the recommendation of the Physical Therapist for safe discharge planning  Based on pt current functional abilities, recommend rehab though pt may progress to DC home  Barriers to Discharge: Inaccessible home environment,Decreased caregiver support        PT Discharge Recommendation: Post acute rehabilitation services          See flowsheet documentation for full assessment

## 2022-03-12 NOTE — PLAN OF CARE
Problem: MOBILITY - ADULT  Goal: Maintain or return to baseline ADL function  Description: INTERVENTIONS:  -  Assess patient's ability to carry out ADLs; assess patient's baseline for ADL function and identify physical deficits which impact ability to perform ADLs (bathing, care of mouth/teeth, toileting, grooming, dressing, etc )  - Assess/evaluate cause of self-care deficits   - Assess range of motion  - Assess patient's mobility; develop plan if impaired  - Assess patient's need for assistive devices and provide as appropriate  - Encourage maximum independence but intervene and supervise when necessary  - Involve family in performance of ADLs  - Assess for home care needs following discharge   - Consider OT consult to assist with ADL evaluation and planning for discharge  - Provide patient education as appropriate  Outcome: Progressing  Goal: Maintains/Returns to pre admission functional level  Description: INTERVENTIONS:  - Perform BMAT or MOVE assessment daily    - Set and communicate daily mobility goal to care team and patient/family/caregiver  - Collaborate with rehabilitation services on mobility goals if consulted  - Perform Range of Motion 3 times a day  - Reposition patient every 32 hours    - Dangle patient 3 times a day  - Stand patient 3 times a day  - Ambulate patient 3 times a day  - Out of bed to chair 3 times a day   - Out of bed for meals 3 times a day  - Out of bed for toileting  - Record patient progress and toleration of activity level   Outcome: Progressing     Problem: PAIN - ADULT  Goal: Verbalizes/displays adequate comfort level or baseline comfort level  Description: Interventions:  - Encourage patient to monitor pain and request assistance  - Assess pain using appropriate pain scale  - Administer analgesics based on type and severity of pain and evaluate response  - Implement non-pharmacological measures as appropriate and evaluate response  - Consider cultural and social influences on pain and pain management  - Notify physician/advanced practitioner if interventions unsuccessful or patient reports new pain  Outcome: Progressing     Problem: INFECTION - ADULT  Goal: Absence or prevention of progression during hospitalization  Description: INTERVENTIONS:  - Assess and monitor for signs and symptoms of infection  - Monitor lab/diagnostic results  - Monitor all insertion sites, i e  indwelling lines, tubes, and drains  - Monitor endotracheal if appropriate and nasal secretions for changes in amount and color  - [place] appropriate cooling/warming therapies per order  - Administer medications as ordered  - Instruct and encourage patient and family to use good hand hygiene technique  - Identify and instruct in appropriate isolation precautions for identified infection/condition  Outcome: Progressing  Goal: Absence of fever/infection during neutropenic period  Description: INTERVENTIONS:  - Monitor WBC    Outcome: Progressing     Problem: SAFETY ADULT  Goal: Maintain or return to baseline ADL function  Description: INTERVENTIONS:  -  Assess patient's ability to carry out ADLs; assess patient's baseline for ADL function and identify physical deficits which impact ability to perform ADLs (bathing, care of mouth/teeth, toileting, grooming, dressing, etc )  - Assess/evaluate cause of self-care deficits   - Assess range of motion  - Assess patient's mobility; develop plan if impaired  - Assess patient's need for assistive devices and provide as appropriate  - Encourage maximum independence but intervene and supervise when necessary  - Involve family in performance of ADLs  - Assess for home care needs following discharge   - Consider OT consult to assist with ADL evaluation and planning for discharge  - Provide patient education as appropriate  Outcome: Progressing  Goal: Maintains/Returns to pre admission functional level  Description: INTERVENTIONS:  - Perform BMAT or MOVE assessment daily    - Set and communicate daily mobility goal to care team and patient/family/caregiver  - Collaborate with rehabilitation services on mobility goals if consulted  - Perform Range of Motion 3 times a day  - Reposition patient every 3 hours    - Dangle patient 3 times a day  - Stand patient 3 times a day  - Ambulate patient 3 times a day  - Out of bed to chair 3 times a day   - Out of bed for meals 3 times a day  - Out of bed for toileting  - Record patient progress and toleration of activity level   Outcome: Progressing  Goal: Patient will remain free of falls  Description: INTERVENTIONS:  - Educate patient/family on patient safety including physical limitations  - Instruct patient to call for assistance with activity   - Consult OT/PT to assist with strengthening/mobility   - Keep Call bell within reach  - Keep bed low and locked with side rails adjusted as appropriate  - Keep care items and personal belongings within reach  - Initiate and maintain comfort rounds  - Make Fall Risk Sign visible to staff  - Offer Toileting every 3 Hours, in advance of need  - Initiate/Maintain 3alarm  - Obtain necessary fall risk management equipment: 3  - Apply yellow socks and bracelet for high fall risk patients  - Consider moving patient to room near nurses station  Outcome: Progressing     Problem: DISCHARGE PLANNING  Goal: Discharge to home or other facility with appropriate resources  Description: INTERVENTIONS:  - Identify barriers to discharge w/patient and caregiver  - Arrange for needed discharge resources and transportation as appropriate  - Identify discharge learning needs (meds, wound care, etc )  - Arrange for interpretive services to assist at discharge as needed  - Refer to Case Management Department for coordinating discharge planning if the patient needs post-hospital services based on physician/advanced practitioner order or complex needs related to functional status, cognitive ability, or social support system  Outcome: Progressing     Problem: Knowledge Deficit  Goal: Patient/family/caregiver demonstrates understanding of disease process, treatment plan, medications, and discharge instructions  Description: Complete learning assessment and assess knowledge base  Interventions:  - Provide teaching at level of understanding  - Provide teaching via preferred learning methods  Outcome: Progressing     Problem: Neurological Deficit  Goal: Neurological status is stable or improving  Description: Interventions:  - Monitor and assess patient's level of consciousness, motor function, sensory function, and level of assistance needed for ADLs  - Monitor and report changes from baseline  Collaborate with interdisciplinary team to initiate plan and implement interventions as ordered  - Provide and maintain a safe environment  - Consider seizure precautions  - Consider fall precautions  - Consider aspiration precautions  - Consider bleeding precautions  Outcome: Progressing     Problem: Activity Intolerance/Impaired Mobility  Goal: Mobility/activity is maintained at optimum level for patient  Description: Interventions:  - Assess and monitor patient  barriers to mobility and need for assistive/adaptive devices  - Assess patient's emotional response to limitations  - Collaborate with interdisciplinary team and initiate plans and interventions as ordered  - Encourage independent activity per ability   - Maintain proper body alignment  - Perform active/passive rom as tolerated/ordered  - Plan activities to conserve energy   - Turn patient as appropriate  Outcome: Progressing     Problem: Potential for Aspiration  Goal: Non-ventilated patient's risk of aspiration is minimized  Description: Assess and monitor vital signs, respiratory status, and labs (WBC)  Monitor for signs of aspiration (tachypnea, cough, rales, wheezing, cyanosis, fever)  - Assess and monitor patient's ability to swallow    - Place patient up in chair to eat if possible  - HOB up at 90 degrees to eat if unable to get patient up into chair   - Supervise patient during oral intake  - Instruct patient/ family to take small bites  - Instruct patient/ family to take small single sips when taking liquids  - Follow patient-specific strategies generated by speech pathologist   Outcome: Progressing  Goal: Ventilated patient's risk of aspiration is minimized  Description: Assess and monitor vital signs, respiratory status, airway cuff pressure, and labs (WBC)  Monitor for signs of aspiration (tachypnea, cough, rales, wheezing, cyanosis, fever)  - Elevate head of bed 30 degrees if patient has tube feeding   - Monitor tube feeding  Outcome: Progressing     Problem: Nutrition  Goal: Nutrition/Hydration status is improving  Description: Monitor and assess patient's nutrition/hydration status for malnutrition (ex- brittle hair, bruises, dry skin, pale skin and conjunctiva, muscle wasting, smooth red tongue, and disorientation)  Collaborate with interdisciplinary team and initiate plan and interventions as ordered  Monitor patient's weight and dietary intake as ordered or per policy  Utilize nutrition screening tool and intervene per policy  Determine patient's food preferences and provide high-protein, high-caloric foods as appropriate  - Assist patient with eating   - Allow adequate time for meals   - Encourage patient to take dietary supplement as ordered  - Collaborate with clinical nutritionist   - Include patient/family/caregiver in decisions related to nutrition  Outcome: Progressing     Problem: Nutrition/Hydration-ADULT  Goal: Nutrient/Hydration intake appropriate for improving, restoring or maintaining nutritional needs  Description: Monitor and assess patient's nutrition/hydration status for malnutrition  Collaborate with interdisciplinary team and initiate plan and interventions as ordered  Monitor patient's weight and dietary intake as ordered or per policy  Utilize nutrition screening tool and intervene as necessary  Determine patient's food preferences and provide high-protein, high-caloric foods as appropriate       INTERVENTIONS:  - Monitor oral intake, urinary output, labs, and treatment plans  - Assess nutrition and hydration status and recommend course of action  - Evaluate amount of meals eaten  - Assist patient with eating if necessary   - Allow adequate time for meals  - Recommend/ encourage appropriate diets, oral nutritional supplements, and vitamin/mineral supplements  - Order, calculate, and assess calorie counts as needed  - Recommend, monitor, and adjust tube feedings and TPN/PPN based on assessed needs  - Assess need for intravenous fluids  - Provide specific nutrition/hydration education as appropriate  - Include patient/family/caregiver in decisions related to nutrition  Outcome: Progressing     Problem: Potential for Falls  Goal: Patient will remain free of falls  Description: INTERVENTIONS:  - Educate patient/family on patient safety including physical limitations  - Instruct patient to call for assistance with activity   - Consult OT/PT to assist with strengthening/mobility   - Keep Call bell within reach  - Keep bed low and locked with side rails adjusted as appropriate  - Keep care items and personal belongings within reach  - Initiate and maintain comfort rounds  - Make Fall Risk Sign visible to staff  - Offer Toileting every 33 Hours, in advance of need  - Initiate/Maintain 3alarm  - Obtain necessary fall risk management equipment: 3  - Apply yellow socks and bracelet for high fall risk patients  - Consider moving patient to room near nurses station  Outcome: Progressing

## 2022-03-12 NOTE — PROGRESS NOTES
[facility]  Progress Note - Travis Speak 1950, 71 y o  male MRN: [id number]  Unit/Bed#: [facility] 713-01 Encounter: [id number]  Primary Care Provider: Mike Yadav MD   Date and time admitted to hospital: 3/5/2022  8:18 PM    * Cervical myelopathy [facility])  Assessment & Plan  · S/p Anterior cervical discectomy and fixation fusion C5/6 and C6/7; Posterior cervical decompression and instrumented fusion C3-T1  · Neurosurgery following  · On Decadron taper  · PT/OT        Ambulatory dysfunction  Assessment & Plan  · Management as above  · PT OT evaluate -discussed with neurosurgery okay for cautious mobilizing with PT    · Defer PT recs and mobility recs to neurosurgery       Paroxysmal A-fib ([facility] )  Assessment & Plan  · Continue heart rate control medications  · Holding Coumadin due to procedure  · Restart heparin drip without bolus on postop day 3 for her Neurosurgery    Goals of care, counseling/discussion  Assessment & Plan  Discussed code status with patient and his spouse who was present on the phone during the time of the conversation  Patient continues to stay that he wishes to be DNR DNI  He understands that the DNI order will need to be rescinded prior to procedure if he requires intubation and reinstated after the procedure  He agrees and also does his wife  Sinus bradycardia  Assessment & Plan  · Sinus bradycardia on EKG  · Patient asymptomatic  · Cardiology following    Mixed hyperlipidemia  Assessment & Plan  · Continue statin    [facility]  Assessment & Plan  · On Coumadin outpatient  · Discussed with Neurosurgery, recommending starting anticoagulation on postop day 3, without bolus of heparin    WILL (obstructive sleep apnea)  Assessment & Plan  · Continue CPAP at night      Essential hypertension  Assessment & Plan  · Continue home blood pressure meds with holding parameters  · Cardiology following          VTE Pharmacologic Prophylaxis: VTE Score: 3 Moderate Risk (Score 3-4) - Pharmacological DVT Prophylaxis Ordered: heparin  Patient Centered Rounds: I performed bedside rounds with nursing staff today  Discussions with Specialists or Other Care Team Provider: discussed with neurosurgery and let them know that they can freely change steroids as needed  Education and Discussions with Family / Patient: called wife Joel Patel   Time Spent for Care: 30 minutes  More than 50% of total time spent on counseling and coordination of care as described above  Current Length of Stay: 7 day(s)  Current Patient Status: Inpatient   Certification Statement: The patient will continue to require additional inpatient hospital stay due to IV steroids and surgical intervention   Discharge Plan: as per neurosurgery     Code Status: Level 3 - DNAR and DNI    Subjective:   Seen for follow up for right sided weakness and cervical myelopathy  Status post neurosurgical intervention, fusion and diskectomy; postop day 1  Patient has no new complaints, states have a feels his strength returning  Denies any numbness, tingling, weakness, headache  Admits to sore throat  States no issues urinating or defecating  Wife called with updates complaints of care    Objective:     Vitals:   Temp (24hrs), Av 9 °F (36 1 °C), Min:95 6 °F (35 3 °C), Max:97 9 °F (36 6 °C)    Temp:  [95 6 °F (35 3 °C)-97 9 °F (36 6 °C)] 97 9 °F (36 6 °C)  HR:  [68-80] 68  Resp:  [12-19] 16  BP: (105-191)/(69-99) 171/94  SpO2:  [93 %-98 %] 97 %  Body mass index is 34 5 kg/m²  Input and Output Summary (last 24 hours): Intake/Output Summary (Last 24 hours) at 3/12/2022 1021  Last data filed at 3/12/2022 0630  Gross per 24 hour   Intake 3040 ml   Output 1205 ml   Net 1835 ml       Physical Exam:   Physical Exam  Constitutional:       General: He is not in acute distress  Appearance: He is not ill-appearing or toxic-appearing  HENT:      Head: Normocephalic        Nose: Nose normal  Mouth/Throat:      Mouth: Mucous membranes are moist    Eyes:      General: No scleral icterus  Right eye: No discharge  Left eye: No discharge  Pupils: Pupils are equal, round, and reactive to light  Cardiovascular:      Rate and Rhythm: Normal rate  Pulmonary:      Effort: Pulmonary effort is normal  No respiratory distress  Breath sounds: No stridor  No wheezing, rhonchi or rales  Chest:      Chest wall: No tenderness  Abdominal:      General: Abdomen is flat  There is no distension  Palpations: There is no mass  Tenderness: There is no abdominal tenderness  There is no left CVA tenderness, guarding or rebound  Hernia: No hernia is present  Musculoskeletal:         General: No swelling, tenderness, deformity or signs of injury  Right lower leg: No edema  Left lower leg: No edema  Skin:     Capillary Refill: Capillary refill takes less than 2 seconds  Coloration: Skin is not jaundiced or pale  Findings: No bruising, erythema, lesion or rash  Neurological:      Mental Status: He is alert  Cranial Nerves: No cranial nerve deficit  Sensory: No sensory deficit  Motor: No weakness  Gait: Gait normal       Deep Tendon Reflexes: Reflexes normal       Comments: RLE weakness and hand weakness      Psychiatric:         Mood and Affect: Mood normal           Additional Data:     Labs:  Results from last 7 days   Lab Units 03/12/22  0638 03/10/22  0516 03/08/22  0535   WBC Thousand/uL 13 85*   < > 9 58   HEMOGLOBIN g/dL 13 2   < > 13 1   HEMATOCRIT % 37 4   < > 36 9   PLATELETS Thousands/uL 182   < > 215   NEUTROS PCT %  --   --  87*   LYMPHS PCT %  --   --  10*   MONOS PCT %  --   --  2*   EOS PCT %  --   --  0    < > = values in this interval not displayed       Results from last 7 days   Lab Units 03/12/22  0639 03/10/22  0232 03/08/22  0535   SODIUM mmol/L 137   < > 137   POTASSIUM mmol/L 3 8   < > 3 8   CHLORIDE mmol/L 102   < > 107   CO2 mmol/L 29   < > 26   BUN mg/dL 18   < > 13   CREATININE mg/dL 0 96   < > 0 82   ANION GAP mmol/L 6   < > 4   CALCIUM mg/dL 9 3   < > 9 4   ALBUMIN g/dL  --   --  3 4*   TOTAL BILIRUBIN mg/dL  --   --  0 58   ALK PHOS U/L  --   --  57   ALT U/L  --   --  18   AST U/L  --   --  16   GLUCOSE RANDOM mg/dL 156*   < > 156*    < > = values in this interval not displayed  Results from last 7 days   Lab Units 03/12/22  0639   INR  1 14     Results from last 7 days   Lab Units 03/12/22  0627 03/11/22  2200 03/11/22  1946 03/11/22  0701 03/10/22  2229 03/10/22  1801 03/10/22  1601 03/10/22  1506 03/10/22  1118 03/10/22  0940 03/09/22  2220 03/09/22  1401   POC GLUCOSE mg/dl 154* 194* 196* 147* 143* 138 132 116 123 149* 143* 128               Lines/Drains:  Invasive Devices  Report    Peripheral Intravenous Line            Peripheral IV 03/08/22 Left Forearm 4 days    Peripheral IV 03/11/22 Left Antecubital <1 day          Drain            Closed/Suction Drain Anterior Neck 7 Fr  <1 day    Closed/Suction Drain Posterior Neck Bulb 7 Fr  <1 day                      Imaging: No pertinent imaging reviewed      Recent Cultures (last 7 days):         Last 24 Hours Medication List:   Current Facility-Administered Medications   Medication Dose Route Frequency Provider Last Rate    acetaminophen  975 mg Oral [facility] Dany Chauhan PA-C      amLODIPine  5 mg Oral Daily Dany Chauhan PA-C      atorvastatin  40 mg Oral QPM Dany Chauhan PA-C      calcium carbonate  1,000 mg Oral Daily PRN Dany Chauhan PA-C      dexamethasone  4 mg Oral [facility] Lawrence Joseph PA-C      Followed by   Monico Ruiz ON 3/13/2022] dexamethasone  2 mg Oral Q8H [address] Lawrence Joseph PA-C      Followed by   Monico Ruiz ON 3/15/2022] dexamethasone  2 mg Oral Q12H [address] Lawrence Joseph PA-C      Followed by   Monico Ruiz ON 3/18/2022] dexamethasone  2 mg Oral Daily Dany Chauhan PA-C      docusate sodium  100 mg Oral BID Krupa Nuñez Frazier Meigs, PA-C      DULoxetine  60 mg Oral HS Sandrine Ruby PA-C      flecainide  100 mg Oral BID Rogelio Vaughn      HYDROmorphone  0 2 mg Intravenous Q1H PRN Sandrine Ruby PA-C      insulin lispro  1-5 Units Subcutaneous HS Sandrine Ruby PA-C      insulin lispro  2-12 Units Subcutaneous 4 times day Sandrine Ruby PA-C      methocarbamol  500 mg Oral Q6H [address] Sandrine Ruby PA-C      metoprolol succinate  100 mg Oral Daily Sandrine Ruby PA-C      ondansetron  4 mg Intravenous Q6H PRN Sandrine Ruby PA-C      oxyCODONE  2 5 mg Oral Q4H PRN Sandrine Ruby PA-C      oxyCODONE  5 mg Oral Q4H PRN Sandrine Ruby PA-C      pantoprazole  40 mg Oral Early Morning Sandrine Ruby PA-C      senna  1 tablet Oral Daily Sandrine Ruby PA-C      sodium chloride  75 mL/hr Intravenous Continuous Sandrine Ruby PA-C 75 mL/hr (03/11/22 2124)        Today, Patient Was Seen By: Benito Valdez MD    **Please Note: This note may have been constructed using a voice recognition system  **

## 2022-03-12 NOTE — PLAN OF CARE
Problem: MOBILITY - ADULT  Goal: Maintain or return to baseline ADL function  Description: INTERVENTIONS:  -  Assess patient's ability to carry out ADLs; assess patient's baseline for ADL function and identify physical deficits which impact ability to perform ADLs (bathing, care of mouth/teeth, toileting, grooming, dressing, etc )  - Assess/evaluate cause of self-care deficits   - Assess range of motion  - Assess patient's mobility; develop plan if impaired  - Assess patient's need for assistive devices and provide as appropriate  - Encourage maximum independence but intervene and supervise when necessary  - Involve family in performance of ADLs  - Assess for home care needs following discharge   - Consider OT consult to assist with ADL evaluation and planning for discharge  - Provide patient education as appropriate  Outcome: Progressing  Goal: Maintains/Returns to pre admission functional level  Description: INTERVENTIONS:  - Perform BMAT or MOVE assessment daily    - Set and communicate daily mobility goal to care team and patient/family/caregiver     - Collaborate with rehabilitation services on mobility goals if consulted  - Record patient progress and toleration of activity level   Outcome: Progressing     Problem: PAIN - ADULT  Goal: Verbalizes/displays adequate comfort level or baseline comfort level  Description: Interventions:  - Encourage patient to monitor pain and request assistance  - Assess pain using appropriate pain scale  - Administer analgesics based on type and severity of pain and evaluate response  - Implement non-pharmacological measures as appropriate and evaluate response  - Consider cultural and social influences on pain and pain management  - Notify physician/advanced practitioner if interventions unsuccessful or patient reports new pain  Outcome: Progressing     Problem: INFECTION - ADULT  Goal: Absence or prevention of progression during hospitalization  Description: INTERVENTIONS:  - Assess and monitor for signs and symptoms of infection  - Monitor lab/diagnostic results  - Monitor all insertion sites, i e  indwelling lines, tubes, and drains  - Monitor endotracheal if appropriate and nasal secretions for changes in amount and color  - [place] appropriate cooling/warming therapies per order  - Administer medications as ordered  - Instruct and encourage patient and family to use good hand hygiene technique  - Identify and instruct in appropriate isolation precautions for identified infection/condition  Outcome: Progressing  Goal: Absence of fever/infection during neutropenic period  Description: INTERVENTIONS:  - Monitor WBC    Outcome: Progressing     Problem: SAFETY ADULT  Goal: Maintain or return to baseline ADL function  Description: INTERVENTIONS:  -  Assess patient's ability to carry out ADLs; assess patient's baseline for ADL function and identify physical deficits which impact ability to perform ADLs (bathing, care of mouth/teeth, toileting, grooming, dressing, etc )  - Assess/evaluate cause of self-care deficits   - Assess range of motion  - Assess patient's mobility; develop plan if impaired  - Assess patient's need for assistive devices and provide as appropriate  - Encourage maximum independence but intervene and supervise when necessary  - Involve family in performance of ADLs  - Assess for home care needs following discharge   - Consider OT consult to assist with ADL evaluation and planning for discharge  - Provide patient education as appropriate  Outcome: Progressing  Goal: Maintains/Returns to pre admission functional level  Description: INTERVENTIONS:  - Perform BMAT or MOVE assessment daily    - Set and communicate daily mobility goal to care team and patient/family/caregiver     - Collaborate with rehabilitation services on mobility goals if consulted  - Out of bed for toileting  - Record patient progress and toleration of activity level   Outcome: Progressing  Goal: Patient will remain free of falls  Description: INTERVENTIONS:  - Educate patient/family on patient safety including physical limitations  - Instruct patient to call for assistance with activity   - Consult OT/PT to assist with strengthening/mobility   - Keep Call bell within reach  - Keep bed low and locked with side rails adjusted as appropriate  - Keep care items and personal belongings within reach  - Initiate and maintain comfort rounds  - Make Fall Risk Sign visible to staff  - Apply yellow socks and bracelet for high fall risk patients  - Consider moving patient to room near nurses station  Outcome: Progressing     Problem: DISCHARGE PLANNING  Goal: Discharge to home or other facility with appropriate resources  Description: INTERVENTIONS:  - Identify barriers to discharge w/patient and caregiver  - Arrange for needed discharge resources and transportation as appropriate  - Identify discharge learning needs (meds, wound care, etc )  - Arrange for interpretive services to assist at discharge as needed  - Refer to Case Management Department for coordinating discharge planning if the patient needs post-hospital services based on physician/advanced practitioner order or complex needs related to functional status, cognitive ability, or social support system  Outcome: Progressing     Problem: Knowledge Deficit  Goal: Patient/family/caregiver demonstrates understanding of disease process, treatment plan, medications, and discharge instructions  Description: Complete learning assessment and assess knowledge base  Interventions:  - Provide teaching at level of understanding  - Provide teaching via preferred learning methods  Outcome: Progressing     Problem: Neurological Deficit  Goal: Neurological status is stable or improving  Description: Interventions:  - Monitor and assess patient's level of consciousness, motor function, sensory function, and level of assistance needed for ADLs     - Monitor and report changes from baseline  Collaborate with interdisciplinary team to initiate plan and implement interventions as ordered  - Provide and maintain a safe environment  - Consider seizure precautions  - Consider fall precautions  - Consider aspiration precautions  - Consider bleeding precautions  Outcome: Progressing     Problem: Activity Intolerance/Impaired Mobility  Goal: Mobility/activity is maintained at optimum level for patient  Description: Interventions:  - Assess and monitor patient  barriers to mobility and need for assistive/adaptive devices  - Assess patient's emotional response to limitations  - Collaborate with interdisciplinary team and initiate plans and interventions as ordered  - Encourage independent activity per ability   - Maintain proper body alignment  - Perform active/passive rom as tolerated/ordered  - Plan activities to conserve energy   - Turn patient as appropriate  Outcome: Progressing     Problem: Communication Impairment  Goal: Ability to express needs and understand communication  Description: Assess patient's communication skills and ability to understand information  Patient will demonstrate use of effective communication techniques, alternative methods of communication and understanding even if not able to speak  - Encourage communication and provide alternate methods of communication as needed  - Collaborate with case management/ for discharge needs  - Include patient/family/caregiver in decisions related to communication  Outcome: Progressing     Problem: Potential for Aspiration  Goal: Non-ventilated patient's risk of aspiration is minimized  Description: Assess and monitor vital signs, respiratory status, and labs (WBC)  Monitor for signs of aspiration (tachypnea, cough, rales, wheezing, cyanosis, fever)  - Assess and monitor patient's ability to swallow  - Place patient up in chair to eat if possible    - HOB up at 90 degrees to eat if unable to get patient up into chair   - Supervise patient during oral intake  - Instruct patient/ family to take small bites  - Instruct patient/ family to take small single sips when taking liquids  - Follow patient-specific strategies generated by speech pathologist   Outcome: Progressing  Goal: Ventilated patient's risk of aspiration is minimized  Description: Assess and monitor vital signs, respiratory status, airway cuff pressure, and labs (WBC)  Monitor for signs of aspiration (tachypnea, cough, rales, wheezing, cyanosis, fever)  - Elevate head of bed 30 degrees if patient has tube feeding   - Monitor tube feeding  Outcome: Progressing     Problem: Nutrition  Goal: Nutrition/Hydration status is improving  Description: Monitor and assess patient's nutrition/hydration status for malnutrition (ex- brittle hair, bruises, dry skin, pale skin and conjunctiva, muscle wasting, smooth red tongue, and disorientation)  Collaborate with interdisciplinary team and initiate plan and interventions as ordered  Monitor patient's weight and dietary intake as ordered or per policy  Utilize nutrition screening tool and intervene per policy  Determine patient's food preferences and provide high-protein, high-caloric foods as appropriate  - Assist patient with eating   - Allow adequate time for meals   - Encourage patient to take dietary supplement as ordered  - Collaborate with clinical nutritionist   - Include patient/family/caregiver in decisions related to nutrition  Outcome: Progressing     Problem: Nutrition/Hydration-ADULT  Goal: Nutrient/Hydration intake appropriate for improving, restoring or maintaining nutritional needs  Description: Monitor and assess patient's nutrition/hydration status for malnutrition  Collaborate with interdisciplinary team and initiate plan and interventions as ordered  Monitor patient's weight and dietary intake as ordered or per policy   Utilize nutrition screening tool and intervene as necessary  Determine patient's food preferences and provide high-protein, high-caloric foods as appropriate       INTERVENTIONS:  - Monitor oral intake, urinary output, labs, and treatment plans  - Assess nutrition and hydration status and recommend course of action  - Evaluate amount of meals eaten  - Assist patient with eating if necessary   - Allow adequate time for meals  - Recommend/ encourage appropriate diets, oral nutritional supplements, and vitamin/mineral supplements  - Order, calculate, and assess calorie counts as needed  - Recommend, monitor, and adjust tube feedings and TPN/PPN based on assessed needs  - Assess need for intravenous fluids  - Provide specific nutrition/hydration education as appropriate  - Include patient/family/caregiver in decisions related to nutrition  Outcome: Progressing     Problem: Potential for Falls  Goal: Patient will remain free of falls  Description: INTERVENTIONS:  - Educate patient/family on patient safety including physical limitations  - Instruct patient to call for assistance with activity   - Consult OT/PT to assist with strengthening/mobility   - Keep Call bell within reach  - Keep bed low and locked with side rails adjusted as appropriate  - Keep care items and personal belongings within reach  - Initiate and maintain comfort rounds  - Make Fall Risk Sign visible to staff  - Offer Toileting every 2 Hours, in advance of need  - Apply yellow socks and bracelet for high fall risk patients  - Consider moving patient to room near nurses station  Outcome: Progressing

## 2022-03-12 NOTE — PROGRESS NOTES
Cardiology Progress Note - Jhonathan Tay 71 y o  male MRN: [id number]    Unit/Bed#: [facility] 0-36 Encounter: [id number]      Assessment:  Principal Problem:    Cervical myelopathy ([facility] )  Active Problems:    Essential hypertension    WILL (obstructive sleep apnea)    Factor V Leiden mutation ([facility] )    Paroxysmal A-fib (HCC)    Mixed hyperlipidemia    Ambulatory dysfunction    Sinus bradycardia    Goals of care, counseling/discussion      Plan:  Patient postop day one after cervical spine surgery  Patient with hard collar in place  He has no chest pain or significant dyspnea  He had no issues overnight  Blood pressure elevated  Will increase dose of amlodipine  BMP today with potassium of 3 8 and creatinine of 0 96  Hemoglobin 13 2  Subjective:   Patient seen and examined  No significant events overnight   negative  Objective:     Vitals: Blood pressure (!) 171/94, pulse 68, temperature 97 9 °F (36 6 °C), resp  rate 17, weight 112 kg (247 lb 5 7 oz), SpO2 97 %  , Body mass index is 34 5 kg/m² ,   Orthostatic Blood Pressures      Most Recent Value   Blood Pressure 171/94 filed at 03/12/2022 0857   Patient Position - Orthostatic VS Lying filed at 03/07/2022 0332      ,      Intake/Output Summary (Last 24 hours) at 3/12/2022 0943  Last data filed at 3/12/2022 0630  Gross per 24 hour   Intake 3040 ml   Output 1630 ml   Net 1410 ml           Physical Exam:    GEN: Jhonathan Tay appears well, alert and oriented x 3, pleasant and cooperative   NECK:  The hard collar in place    HEART: normal rate, regular rhythm, normal S1 and S2, no murmurs, clicks, gallops or rubs   LUNGS: clear to auscultation bilaterally; no wheezes, rales, or rhonchi   ABDOMEN: normal bowel sounds, soft, no tenderness, no distention  EXTREMITIES: peripheral pulses normal; no clubbing, cyanosis, or edema  SKIN: warm and well perfused, no suspicious lesions on exposed skin    Labs & Results:    No results displayed because visit has over 200 results  CTA head and neck with and without contrast    Result Date: 3/4/2022  Narrative: CTA NECK AND BRAIN WITH AND WITHOUT CONTRAST INDICATION: unsteady, right leg drift 4/5 strength x 3 weeks COMPARISON:   None  TECHNIQUE:  Routine CT imaging of the Brain without contrast   Post contrast imaging was performed after administration of iodinated contrast through the neck and brain  Post contrast axial 0 625 mm images timed to opacify the arterial system  3D rendering was performed on an independent workstation  MIP reconstructions performed  Coronal reconstructions were performed of the noncontrast portion of the brain  Radiation dose length product (DLP) for this visit:  988 76 832 mGy-cm   This examination, like all CT scans performed in the [facility], was performed utilizing techniques to minimize radiation dose exposure, including the use of iterative reconstruction and automated exposure control  IV Contrast:  85 mL of iohexol (OMNIPAQUE)  IMAGE QUALITY:   Diagnostic FINDINGS: NONCONTRAST BRAIN PARENCHYMA:  No intracranial mass, mass effect or midline shift  No CT signs of acute infarction  No acute parenchymal hemorrhage  VENTRICLES AND EXTRA-AXIAL SPACES:  Normal for the patient's age  VISUALIZED ORBITS AND PARANASAL SINUSES:  Unremarkable  CERVICAL VASCULATURE AORTIC ARCH AND GREAT VESSELS:  Normal aortic arch and great vessel origins  Normal visualized subclavian vessels  RIGHT VERTEBRAL ARTERY CERVICAL SEGMENT:  Moderate stenosis at the origin  The vessel is normal in caliber throughout the neck  LEFT VERTEBRAL ARTERY CERVICAL SEGMENT:  Normal origin  The vessel is normal in caliber throughout the neck  RIGHT EXTRACRANIAL CAROTID SEGMENT:  Mild atherosclerotic disease of the distal common carotid artery and proximal cervical internal carotid artery without significant stenosis compared to the more distal ICA   LEFT EXTRACRANIAL CAROTID SEGMENT:  Mild atherosclerotic disease of the distal common carotid artery and proximal cervical internal carotid artery without significant stenosis compared to the more distal ICA  NASCET criteria was used to determine the degree of internal carotid artery diameter stenosis  INTRACRANIAL VASCULATURE INTERNAL CAROTID ARTERIES:  Normal enhancement of the intracranial portions of the internal carotid arteries  Normal ophthalmic artery origins  Normal ICA terminus  ANTERIOR CIRCULATION:  Symmetric A1 segments and anterior cerebral arteries with normal enhancement  Normal anterior communicating artery  MIDDLE CEREBRAL ARTERY CIRCULATION:  M1 segment and middle cerebral artery branches demonstrate normal enhancement bilaterally  DISTAL VERTEBRAL ARTERIES:  Normal distal vertebral arteries  Posterior inferior cerebellar artery origins are normal  Normal vertebral basilar junction  BASILAR ARTERY:  Basilar artery is normal in caliber  Normal superior cerebellar arteries  POSTERIOR CEREBRAL ARTERIES: The right posterior cerebral artery arises from the basilar tip  There is fetal origin of the left posterior cerebral artery  Both demonstrate no focal stenosis  Normal posterior communicating arteries  VENOUS STRUCTURES:  Normal  NON VASCULAR ANATOMY BONY STRUCTURES:  No acute osseous abnormality  SOFT TISSUES OF THE NECK:  Normal  THORACIC INLET:  Unremarkable  Impression: 1  No evidence of acute intracranial hemorrhage  2  No evidence of occlusive disease  Workstation performed: [id number]     XR chest portable    Result Date: 3/4/2022  Narrative: CHEST INDICATION:   Chest Pain  COMPARISON:  1/23/2015 EXAM PERFORMED/VIEWS:  XR CHEST PORTABLE FINDINGS: Cardiomediastinal silhouette appears unremarkable  The lungs are clear  No pneumothorax or pleural effusion  Osseous structures appear within normal limits for patient age  Impression: No acute cardiopulmonary disease   Workstation performed: [id number]     XR cervical spine 2 or 3 views    Result Date: 3/12/2022  Narrative: CERVICAL SPINE INDICATION:   s/p cervical fusion  COMPARISON:  Fluoroscopy images from previous  VIEWS:  XR SPINE CERVICAL 2 OR 3 VW INJURY Images: 3 FINDINGS: The patient is status post anterior cervical fusion /discectomy from C5 through C7  Posterior laminectomy and fusion hardware from C3 to T1  Alignment appears anatomic  3 mm anterolisthesis C4 upon C5  Skin staples and surgical drain  Impression: ACDF C5-C7 and posterior fusion hardware from C3 through T1 as above  Workstation performed: [id number]     XR knee 4+ vw left injury    Result Date: 2/26/2022  Narrative: LEFT KNEE INDICATION:   M17 12: Unilateral primary osteoarthritis, left knee  COMPARISON:  Left knee x-ray 6/5/2020 VIEWS:  XR KNEE 4+ VW LEFT INJURY Images: 6 FINDINGS: There is no acute fracture or dislocation  There is no joint effusion  Tricompartmental osteoarthritis with severe narrowing of the medial tibiofemoral joint space and osteophytes seen in all 3 compartments  There is mild genu varus  No lytic or blastic osseous lesion  There are atherosclerotic calcifications  Soft tissues are otherwise unremarkable  Impression: Severe degenerative changes of left knee  Workstation performed: [id number]     XR knee 4+ vw right injury    Result Date: 2/26/2022  Narrative: RIGHT KNEE INDICATION:   M17 11: Unilateral primary osteoarthritis, right knee  COMPARISON:  Right knee x-ray 6/5/2020 VIEWS:  XR KNEE 4+ VW RIGHT INJURY Images: 5 FINDINGS: There is no acute fracture or dislocation  There is no joint effusion  Tricompartmental osteoarthritis with severe narrowing of the lateral tibiofemoral joint space and osteophytes seen in all 3 compartments  There is mild genu valgus  No lytic or blastic osseous lesion  There are atherosclerotic calcifications  Soft tissues are otherwise unremarkable  Impression: Severe degenerative changes of right knee   Workstation performed: [id number]     MRI brain wo contrast    Result Date: 3/5/2022  Narrative: MRI BRAIN WITHOUT CONTRAST INDICATION: Rule out CVA  COMPARISON: CTA performed yesterday, MRI dated 3/27/2021  TECHNIQUE:  Sagittal T1, axial T2, axial FLAIR, axial T1, axial Hernando and axial diffusion imaging  IMAGE QUALITY:  Diagnostic  FINDINGS: BRAIN PARENCHYMA:  There is no discrete mass, mass effect or midline shift  There is no intracranial hemorrhage  There is no evidence of acute infarction and diffusion imaging is unremarkable  Small scattered hyperintensities on T2/FLAIR imaging are noted in the periventricular and subcortical white matter demonstrating an appearance that is statistically most likely to represent mild microangiopathic change  VENTRICLES:  Mild volume loss  No hydrocephalus  SELLA AND PITUITARY GLAND:  Normal  ORBITS:  Normal  PARANASAL SINUSES: Minimal ethmoid mucosal thickening  Minimal mastoid opacification is improved compared with the previous MRI  VASCULATURE:  Evaluation of the major intracranial vasculature demonstrates appropriate flow voids  CALVARIUM AND SKULL BASE:  Normal  EXTRACRANIAL SOFT TISSUES:  Normal      Impression: No acute intracranial pathology  Mild chronic microangiopathy  Workstation performed: [id number]     MRI cervical spine w wo contrast    Result Date: 3/5/2022  Narrative: MRI CERVICAL SPINE WITH AND WITHOUT CONTRAST INDICATION: CT findings, weakness  COMPARISON:  CT dated 3/4/2022 and MRI dated 3/15/2013  TECHNIQUE:  Sagittal T1, sagittal T2, sagittal inversion recovery, axial 2D merge and axial T2  Sagittal T1 and axial T1 postcontrast   IV Contrast:  11 mL of Gadobutrol injection (SINGLE-DOSE) IMAGE QUALITY:  Motion artifact, worse on axial imaging  FINDINGS: ALIGNMENT:  Degenerative grade 1 anterolisthesis at C5-C6 is unchanged  Minimal degenerative anterolisthesis at C4-5 is less apparent   Alignment is otherwise normal  MARROW SIGNAL: Multilevel endplate degenerative changes most pronounced at C5-6 with mixed Modic type I and type II changes  No suspicious marrow signal abnormality  CERVICAL AND VISUALIZED UPPER THORACIC CORD:  Mild increased signal within the cord at C5-6 is suggested on sagittal images that may represent myelomalacia rather than edema    This cannot be confirmed on motion limited axial images  Cord is otherwise normal in signal intensity  PREVERTEBRAL AND PARASPINAL SOFT TISSUES:  Normal  VISUALIZED POSTERIOR FOSSA:  The visualized posterior fossa demonstrates no abnormal signal  CERVICAL DISC SPACES:  Severe space narrowing at C5-6 and C6-7 is stable compared with CT but progressed from previous MRI  C2-C3:  Disc complex with facet and uncovertebral hypertrophy  Mild canal stenosis  Bilateral foraminal stenosis, mild on the right and moderate on the left  C3-C4:  Disc osteophyte complex with facet, uncovertebral and ligamentum flavum hypertrophy  Moderate canal stenosis  Slight flattening of the cord  Severe bilateral foraminal stenosis  C4-C5:  Small disc osteophyte complex, facet and uncovertebral hypertrophy  Mild to moderate canal stenosis  Bilateral foraminal stenosis  C5-C6: Limited evaluation on axial imaging  Discogenic complex with facet, uncovertebral and ligamentum flavum hypertrophy  Severe canal stenosis with flattening the cord has progressed since 2013  Bilateral foraminal stenosis  C6-C7:  Disc osteophyte complex with facet and uncovertebral hypertrophy  Mild canal stenosis  Bilateral foraminal stenosis right worse than left  C7-T1:  No disc herniation or canal stenosis  Facet arthropathy with at least mild foraminal stenosis  UPPER THORACIC DISC SPACES:  Small disc osteophyte complexes that mildly indent the thecal sac without significant canal stenosis  Facet arthropathy with foraminal stenosis most pronounced at T1-T2  POSTCONTRAST IMAGING:  Normal      Impression: No acute abnormality  Motion limited    Multilevel degenerative spondylosis progressed from 2013 most severe at C5-6 with severe canal stenosis and flattening of the cord  Small focus of T2 signal within the cord at C5-C6 may represent myelomalacia  Moderate canal stenosis at C3-4 mildly indents the cord without signal abnormality  Marked multilevel foraminal stenosis  No prevertebral edema or mass  Workstation performed: [id number]     XR bone length (scanogram)    Result Date: 2/26/2022  Narrative: LEG LENGTH STUDY INDICATION:   M17 11: Unilateral primary osteoarthritis, right knee  COMPARISON:  None VIEWS:  XR BONE LENGTH (SCANOGRAM) Images: 7 FINDINGS: No fractures or pathologic lesions  Degenerative changes of bilateral knees  Bone length measurements are as follows: Right femur from femoral head to medial femoral condyle = 50 6 cm Left femur from femoral head to medial femoral condyle = 51 0 cm Right tibia from medial tibial plateau to the tibial plafond = 36 9 cm Left tibia from medial tibial plateau to the tibial plafond = 36 6 cm Total right leg length (including knee joint space) from femoral head to tibial plafond = 87 5 cm Total left leg length (including knee joint space) from femoral head to tibial plafond = 87 8 cm     Impression: Leg length measurements as above  Workstation performed: [id number]     CT recon only cervical spine (No Charge)    Result Date: 3/4/2022  Narrative: CT CERVICAL SPINE - WITHOUT CONTRAST INDICATION:   Neck pain, acute, no red flags na RUE numbness/tingling, neck pain  COMPARISON:  None  TECHNIQUE:  CT examination of the cervical spine was performed without intravenous contrast   Contiguous axial images were obtained  Sagittal and coronal reconstructions were performed  Radiation dose length product (DLP) for this visit:  0 mGy-cm   This examination, like all CT scans performed in the [facility], was performed utilizing techniques to minimize radiation dose exposure, including the use of iterative reconstruction and automated exposure control    IMAGE QUALITY: Diagnostic  FINDINGS: ALIGNMENT:  Grade 1 anterolisthesis of C4 on C5 possibly from degenerative facet arthrosis VERTEBRAL BODIES:  No fracture  Suspect asymmetric soft tissue in the right paraspinal region  Correlate with contrast-enhanced cervical spine MRI  DEGENERATIVE CHANGES:  Multilevel facet severe arthritic changes causing multilevel neural foraminal narrowing at nearly all levels  Disc osteophyte complex and posterior endplate osteophytes causing spinal canal stenosis at C5-C6 and C6-C7  Complete loss of the disc space at C5-C6 and C6-C7 where there is endplate erosive changes most likely degenerative in the absence of suspected infection  THORACIC INLET:  Normal      Impression: Multilevel degenerative changes causing multilevel neural foraminal and spinal canal stenosis as described  Suspect asymmetric soft tissue in the right paraspinal region  Correlate with contrast-enhanced cervical spine MRI recommended to exclude paraspinal soft tissue infection  Workstation performed: [id number]     Echo follow up/limited w/ contrast if indicated    Result Date: 3/7/2022  Narrative: Maurice Bah  Left Ventricle: Left ventricular cavity size is normal  Wall thickness is normal  The left ventricular ejection fraction is 60%  Systolic function is normal  Wall motion is normal  Diastolic function is moderately abnormal, consistent with grade II (pseudonormal) relaxation    Left Atrium: The atrium is moderately dilated    Right Atrium: The atrium is mildly dilated    Aortic Valve: The aortic valve is trileaflet  The leaflets are moderately calcified  There is moderately reduced mobility  There is trace regurgitation  There is moderate stenosis  Mean gradiet 17 mmHg, EDELMIRA 1 3 cm2    Mitral Valve: There is mild regurgitation    Tricuspid Valve: There is mild regurgitation  EKG personally reviewed by Sumi White MD      Counseling / Coordination of Care  Total floor / unit time spent today 30 minutes    Greater than 50% of total time was spent with the patient and / or family counseling and / or coordination of care

## 2022-03-12 NOTE — OP NOTE
OPERATIVE REPORT  PATIENT NAME: Alana Mckeon    :  1950  MRN: [id number]  Pt Location:  OR ROOM 09    SURGERY DATE: 3/11/2022    Surgeon(s) and Role:     * Campos Johnson MD - Primary    Preop Diagnosis:  Cervical myelopathy ([facility] ) [G95 9]    Post-Op Diagnosis Codes:     * Cervical myelopathy ([address] ) [G95 9]    Procedure(s) (LRB): Anterior cervical discectomy and fixation fusion C5/6 and C6/7; Posterior cervical decompression and instrumented fusion C3-T1 (N/A)    Specimen(s):  * No specimens in log *    Estimated Blood Loss:   200 mL    Drains:  Closed/Suction Drain Anterior Neck 7 Fr  (Active)   Number of days: 0       Closed/Suction Drain Posterior Neck Bulb 7 Fr  (Active)   Number of days: 0       [REMOVED] Urethral Catheter Non-latex 16 Fr  (Removed)   Collection Container Standard drainage bag 22 1252   Number of days: 0       Anesthesia Type:   General    Operative Indications:  Cervical myelopathy ([facility] ) [G95 9]    Operative Findings:  Severe cervical spondylosis    Complications:   None    Procedure and Technique:  After adequate general endotracheal anesthesia the patient was placed supine on the operating table  The anterior neck was prepped with Betadine soap then DuraPrep  Double layer drapes were placed in normal fashion and a 3M Betadine impregnated sticky drape was placed over these  Time-out was called and all parameters a time-out were followed  The skin of the anterior neck was injected with 1% lidocaine with 1 100,000 epinephrine  A number 15 blade was used to incise the skin  Bovie and bipolar were used throughout the procedure to maintain hemostasis  The Bovie on the cutting setting was used to divide the tissues to the platysma  The platysma was identified, coagulated, and divided  A combination of blunt and sharp dissection was carried out to expose the structures of the anterior spinal space   The longus coli muscles were elevated from the underlying bone with the use of the Bovie  The Temple-[place] retractor system was used to maintain operative exposure  Intraoperative fluoroscopy was used throughout the case to aid in the identification of level as well as positioning of construct  There was radiology over-read  The annulus of the C5-6 and C6-7 were thus identified  Large anterior projecting osteophytes were removed with the use of Leksell rongeur  Combination of pituitary rongeurs, Kerrison rongeurs, and curettes as well as the Midas-Juni drill with a series of cornerstone barrel bits were utilized to perform a complete diskectomy  Posterior projecting osteophytes were removed with the use of a 2 5 mm match stick on the Midas-Juni drill and curved curettes and Kerrison rongeurs  Bilateral decompressive foraminotomies were performed with the use of curved curettes and Kerrison rongeurs as well as the Midas juni drill  Next, a peek construct filled with demineralized bone matrix was impacted into position without difficulty  A completely separate titanium anterior fixation plate [employer] spanning the C5-7 levels was then secured into place with 15 mm very angle screws  The locking mechanism was turned  Final AP and lateral images were obtained fluoroscopically  Valsalva x2 produced no bleeding  Accordingly the platysma was approximated with interrupted inverted 3 0 Vicryl suture  The skin was closed with interrupted inverted 3 0 Vicryl suture  Benzoin and Steri-Strips were placed and a sterile dressing was placed  The patient was then turned prone onto a 2nd operating room table  The head was placed into 3 point head fixation after the patient had been placed in adequate general endotracheal anesthesia  The patient was turned prone and the head and neck were clipped free of hair  This was then prepped with Betadine soap then DuraPrep    Double layer drapes were placed in normal fashion and a Betadine impregnated sticky drape was placed over these  A time-out was called and all parameters a time-out were followed  The the skin in the midline centered over C2 spinous process to the T2 spinous process was injected with 1% lidocaine with 1 100,000 epinephrine  A 15  Blade was used to incise the skin  Bovie and bipolar were used throughout the procedure to maintain hemostasis  The Bovie and a cutting setting was used to divide the tissues to the dorsal fascia  A subperiosteal dissection was carried out of the spinous processes lamina and lateral masses of C3 through T1  Self-retaining retractors retractors were used to maintain operative exposure  Under fluoroscopic guidance infinPopJax [employer]) lateral mass screws were placed into the lateral masses of C3 through C7   5 5 mm by 30 mm screws were placed into T1 after each pedicle had been probed with a pedicle finder and then tapped  Next, 95 mm rods were cut to the appropriate length and introduced into the tulips of the aforementioned screws  Caps were placed and tightened to the appropriate torque  The cartilaginous material in the facets was carefully debrided and the bone was lightly decorticated  This was done so as to facilitate later posterior lateral fusion  A trough was then drilled at the lateral aspect of the lamina and medial to the lateral masses from C C3 through C7  The interspinous ligament was released with the use of the Bovie, bipolar and scissors  Cynhugoa Sartorius was then used to hold the spinous process and elevate the spinous process and laminar an interspinous ligament elements off the underlying dura  As this was done epidural veins were coagulated and divided  Copious quantities of irrigation were used to cleanse out the region  FloSeal was placed in the lateral gutters as well as bone wax were appropriate  Copious quantities of antibiotic irrigation were used to cleanse out the region      Locally harvested bone from the spinous process and lamina was debrided of soft tissue and then placed the bone mill and morcellated  This was mixed with demineralized bone matrix and then placed in the posterior lateral position so as to allow for a posterior lateral fusion to be performed  Copious quantities of antibiotic irrigation were used to cleanse out the region  The muscle was then approximated with interrupted 0 Vicryl suture  The fascia was closed with interrupted 0 Vicryl suture  The fat was approximated with interrupted inverted to 0 Vicryl suture the skin was closed with staples  A 7 mm flat Kris-Tran drain was placed in the epidural space  Clean sterile dressings were placed  The patient was taken out of pins, turned supine  There were no significant alterations in the EMG the, somatosensory-evoked potentials were motor-evoked potentials in this case     I was present for the entire procedure    Patient Disposition:  PACU       SIGNATURE: Arletta Bloch, MD  DATE: March 11, 2022  TIME: 7:26 PM

## 2022-03-12 NOTE — ASSESSMENT & PLAN NOTE
POD 1 Anterior cervical discectomy and fixation fusion C5/6 and C6/7; Posterior cervical decompression and instrumented fusion C3-T1  · Pre op- severe canal stenosis C5-6 with small area of signal abnormality   Presented with significant decline in ambulation along with numbness and weakness R>L over the last week  Reports multiple falls  Imaging:    Cervical spine x-ray 03/11/2022:ACDF C5-C7 and posterior fusion hardware from C3 through T1 as above  Plan:   Continue monitor neurological exam and symptoms   Reviewed imaging with patient   [place] collar to remain in place at all times, makenzie for [place] collar for showering   Continue Decadron 48hr taper   Given 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, prior anticoagulation (Coumadin) is currently on hold  Discussed with makenzie Lobato to start heparin drip without bolus on POD 3    Medical management and pain control per primary team   Drains:  o Anterior MARCIN drain with 110 mL since OR with serosanguineous output, will maintain at this time  o Posterior MARCIN drain with 245ml since OR with serosanguineous output, will maintain at this time  · Patient tolerating diet with no complaints with swallowing or with breathing  He does complain of a dry mouth  · Mobilize with PT/OT  · Encourage incentive spirometer  · DVT PPX: SCDs    Neurosurgery will continue to follow closely, call with any further questions or concerns

## 2022-03-13 PROBLEM — D72.829 LEUKOCYTOSIS: Status: ACTIVE | Noted: 2022-03-13

## 2022-03-13 LAB
ANION GAP SERPL CALCULATED.3IONS-SCNC: 6 MMOL/L (ref 4–13)
BUN SERPL-MCNC: 16 MG/DL (ref 5–25)
CALCIUM SERPL-MCNC: 9 MG/DL (ref 8.3–10.1)
CHLORIDE SERPL-SCNC: 101 MMOL/L (ref 100–108)
CO2 SERPL-SCNC: 30 MMOL/L (ref 21–32)
CREAT SERPL-MCNC: 0.76 MG/DL (ref 0.6–1.3)
ERYTHROCYTE [DISTWIDTH] IN BLOOD BY AUTOMATED COUNT: 13.1 % (ref 11.6–15.1)
GFR SERPL CREATININE-BSD FRML MDRD: 91 ML/MIN/1.73SQ M
GLUCOSE SERPL-MCNC: 119 MG/DL (ref 65–140)
GLUCOSE SERPL-MCNC: 126 MG/DL (ref 65–140)
GLUCOSE SERPL-MCNC: 142 MG/DL (ref 65–140)
GLUCOSE SERPL-MCNC: 148 MG/DL (ref 65–140)
GLUCOSE SERPL-MCNC: 149 MG/DL (ref 65–140)
GLUCOSE SERPL-MCNC: 153 MG/DL (ref 65–140)
HCT VFR BLD AUTO: 41.1 % (ref 36.5–49.3)
HGB BLD-MCNC: 14 G/DL (ref 12–17)
MCH RBC QN AUTO: 30 PG (ref 26.8–34.3)
MCHC RBC AUTO-ENTMCNC: 34.1 G/DL (ref 31.4–37.4)
MCV RBC AUTO: 88 FL (ref 82–98)
PLATELET # BLD AUTO: 176 THOUSANDS/UL (ref 149–390)
PMV BLD AUTO: 10.3 FL (ref 8.9–12.7)
POTASSIUM SERPL-SCNC: 4.1 MMOL/L (ref 3.5–5.3)
RBC # BLD AUTO: 4.67 MILLION/UL (ref 3.88–5.62)
SODIUM SERPL-SCNC: 137 MMOL/L (ref 136–145)
WBC # BLD AUTO: 13.33 THOUSAND/UL (ref 4.31–10.16)

## 2022-03-13 PROCEDURE — 99024 POSTOP FOLLOW-UP VISIT: CPT | Performed by: NURSE PRACTITIONER

## 2022-03-13 PROCEDURE — 85027 COMPLETE CBC AUTOMATED: CPT | Performed by: INTERNAL MEDICINE

## 2022-03-13 PROCEDURE — 80048 BASIC METABOLIC PNL TOTAL CA: CPT | Performed by: INTERNAL MEDICINE

## 2022-03-13 PROCEDURE — 99232 SBSQ HOSP IP/OBS MODERATE 35: CPT | Performed by: INTERNAL MEDICINE

## 2022-03-13 PROCEDURE — 82948 REAGENT STRIP/BLOOD GLUCOSE: CPT

## 2022-03-13 RX ADMIN — DEXAMETHASONE 2 MG: 2 TABLET ORAL at 21:50

## 2022-03-13 RX ADMIN — ACETAMINOPHEN 325MG 975 MG: 325 TABLET ORAL at 21:47

## 2022-03-13 RX ADMIN — FLECAINIDE ACETATE 100 MG: 100 TABLET ORAL at 08:16

## 2022-03-13 RX ADMIN — HYDRALAZINE HYDROCHLORIDE 50 MG: 50 TABLET ORAL at 14:20

## 2022-03-13 RX ADMIN — DULOXETINE HYDROCHLORIDE 60 MG: 60 CAPSULE, DELAYED RELEASE ORAL at 21:46

## 2022-03-13 RX ADMIN — HEPARIN SODIUM 5000 UNITS: 5000 INJECTION INTRAVENOUS; SUBCUTANEOUS at 21:51

## 2022-03-13 RX ADMIN — FLECAINIDE ACETATE 100 MG: 100 TABLET ORAL at 18:14

## 2022-03-13 RX ADMIN — DEXAMETHASONE 4 MG: 4 TABLET ORAL at 05:46

## 2022-03-13 RX ADMIN — ACETAMINOPHEN 325MG 975 MG: 325 TABLET ORAL at 05:46

## 2022-03-13 RX ADMIN — DEXAMETHASONE 4 MG: 4 TABLET ORAL at 14:20

## 2022-03-13 RX ADMIN — ACETAMINOPHEN 325MG 975 MG: 325 TABLET ORAL at 14:20

## 2022-03-13 RX ADMIN — DOCUSATE SODIUM 100 MG: 100 CAPSULE ORAL at 08:16

## 2022-03-13 RX ADMIN — AMLODIPINE BESYLATE 5 MG: 5 TABLET ORAL at 08:16

## 2022-03-13 RX ADMIN — METHOCARBAMOL 500 MG: 500 TABLET, FILM COATED ORAL at 18:14

## 2022-03-13 RX ADMIN — METOPROLOL SUCCINATE 100 MG: 100 TABLET, EXTENDED RELEASE ORAL at 08:16

## 2022-03-13 RX ADMIN — HYDRALAZINE HYDROCHLORIDE 50 MG: 50 TABLET ORAL at 05:46

## 2022-03-13 RX ADMIN — ATORVASTATIN CALCIUM 40 MG: 40 TABLET, FILM COATED ORAL at 18:14

## 2022-03-13 RX ADMIN — OXYCODONE HYDROCHLORIDE 5 MG: 5 TABLET ORAL at 03:45

## 2022-03-13 RX ADMIN — STANDARDIZED SENNA CONCENTRATE 8.6 MG: 8.6 TABLET ORAL at 08:16

## 2022-03-13 RX ADMIN — AMLODIPINE BESYLATE 5 MG: 5 TABLET ORAL at 21:46

## 2022-03-13 RX ADMIN — HYDRALAZINE HYDROCHLORIDE 50 MG: 50 TABLET ORAL at 21:50

## 2022-03-13 RX ADMIN — PANTOPRAZOLE SODIUM 40 MG: 40 TABLET, DELAYED RELEASE ORAL at 05:46

## 2022-03-13 RX ADMIN — DOCUSATE SODIUM 100 MG: 100 CAPSULE ORAL at 18:14

## 2022-03-13 RX ADMIN — METHOCARBAMOL 500 MG: 500 TABLET, FILM COATED ORAL at 11:31

## 2022-03-13 RX ADMIN — METHOCARBAMOL 500 MG: 500 TABLET, FILM COATED ORAL at 05:46

## 2022-03-13 NOTE — ASSESSMENT & PLAN NOTE
POD 2 Anterior cervical discectomy and fixation fusion C5/6 and C6/7; Posterior cervical decompression and instrumented fusion C3-T1  · Pre op- severe canal stenosis C5-6 with small area of signal abnormality   Presented with significant decline in ambulation along with numbness and weakness R>L over the last week  Reports multiple falls  Imaging:    Cervical spine x-ray 03/11/2022:ACDF C5-C7 and posterior fusion hardware from C3 through T1 as above  Plan:   Continue monitor neurological exam and symptoms   Vista collar to remain in place at all times, okay for [place] collar for showering   Continue Decadron 48hr taper   Given 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, prior anticoagulation (Coumadin) is currently on hold  Discussed with Dr Garcia, makenzie to start heparin drip without bolus on POD 3    Medical management and pain control per primary team   Drains:  o Anterior MARCIN drain with 25 mL/24hr with serosanguineous output, removed this morning without difficulty, Steri strips placed clean, dry, intact, patient tolerated well   o Posterior MARCIN drain with 75ml/24hr with serosanguineous output, removed this morning without difficulty, 1 suture tied down in place, patient tolerated well  New dressing applied to cervical incision  · Patient tolerating diet with no complaints with swallowing or with breathing  He reports some throat soreness  · Mobilize with PT/OT, recommending acute rehab  · Encouraged incentive spirometer  · DVT PPX: SCDs and okay for SQ heparin, held this morning's dose as well as 1400 dose secondary to removing anterior and posterior drains, okay for 2200 heparin    Neurosurgery will continue to follow closely, call with any further questions or concerns

## 2022-03-13 NOTE — PROGRESS NOTES
[address] [facility]  Progress Note - Brunilda Blancas 1950, 71 y o  male MRN: [id number]  Unit/Bed#: [facility] 713-01 Encounter: [id number]  Primary Care Provider: Alia Hill MD   Date and time admitted to hospital: 3/5/2022  8:18 PM    * Cervical myelopathy [facility])  Assessment & Plan  · S/p Anterior cervical discectomy and fixation fusion C5/6 and C6/7; Posterior cervical decompression and instrumented fusion C3-T1  · Patient continues to improve    Plan  · Neurosurgery following  · On Decadron taper  · PT/OT        Ambulatory dysfunction  Assessment & Plan  · Management as above  · PT OT      Paroxysmal A-fib (HCC)  Assessment & Plan  · Continue heart rate control medications  · Holding Coumadin due to procedure  · Restart heparin drip without bolus on postop day 3 for her Neurosurgery    Leukocytosis  Assessment & Plan  · Afebrile and hemodynamically stable  · Likely due to steroid use  · Continue to monitor    Goals of care, counseling/discussion  Assessment & Plan  Discussed code status with patient and his spouse who was present on the phone during the time of the conversation  Patient continues to stay that he wishes to be DNR DNI  He understands that the DNI order will need to be rescinded prior to procedure if he requires intubation and reinstated after the procedure  He agrees and also does his wife  Sinus bradycardia  Assessment & Plan  · Sinus bradycardia on EKG  · Patient asymptomatic  · Cardiology following    Mixed hyperlipidemia  Assessment & Plan  · Continue statin    Factor V Leiden mutation ([facility] [address] )  Assessment & Plan  · On Coumadin outpatient  · Discussed with Neurosurgery, recommending starting anticoagulation on postop day 3, without bolus of heparin    WILL (obstructive sleep apnea)  Assessment & Plan  · Continue CPAP at night      Essential hypertension  Assessment & Plan  · Uncontrolled, likely due to steroid use; improved with addition of hydralazine and increased amlodipine  · Continue home blood pressure meds   · Cardiology following          VTE Pharmacologic Prophylaxis: VTE Score: 3 Moderate Risk (Score 3-4) - Pharmacological DVT Prophylaxis Ordered: heparin  Patient Centered Rounds: I performed bedside rounds with nursing staff today  Discussions with Specialists or Other Care Team Provider: discussed with neurosurgery and let them know that they can freely change steroids as needed  Education and Discussions with Family / Patient: called wife Nikki Rainey   Time Spent for Care: 30 minutes  More than 50% of total time spent on counseling and coordination of care as described above  Current Length of Stay: 8 day(s)  Current Patient Status: Inpatient   Certification Statement: The patient will continue to require additional inpatient hospital stay due to IV steroids and surgical intervention   Discharge Plan: as per neurosurgery     Code Status: Level 3 - DNAR and DNI    Subjective:   Seen for follow up for right sided weakness and cervical myelopathy  Status post neurosurgical intervention, fusion and diskectomy; postop day 2  Has no new complaints  States that he feels that he is improving    Objective:     Vitals:   Temp (24hrs), Av 8 °F (36 6 °C), Min:97 8 °F (36 6 °C), Max:97 8 °F (36 6 °C)    Temp:  [97 8 °F (36 6 °C)] 97 8 °F (36 6 °C)  HR:  [] 60  BP: (139-181)/(59-94) 141/75  SpO2:  [76 %-100 %] 93 %  Body mass index is 34 04 kg/m²  Input and Output Summary (last 24 hours): Intake/Output Summary (Last 24 hours) at 3/13/2022 1525  Last data filed at 3/13/2022 1127  Gross per 24 hour   Intake --   Output 1500 ml   Net -1500 ml       Physical Exam:   Physical Exam  Constitutional:       General: He is not in acute distress  Appearance: He is not ill-appearing or toxic-appearing  HENT:      Head: Normocephalic        Nose: Nose normal       Mouth/Throat:      Mouth: Mucous membranes are moist    Eyes:      General: No scleral icterus  Right eye: No discharge  Left eye: No discharge  Pupils: Pupils are equal, round, and reactive to light  Cardiovascular:      Rate and Rhythm: Normal rate  Pulmonary:      Effort: Pulmonary effort is normal  No respiratory distress  Breath sounds: No stridor  No wheezing, rhonchi or rales  Chest:      Chest wall: No tenderness  Abdominal:      General: Abdomen is flat  There is no distension  Palpations: There is no mass  Tenderness: There is no abdominal tenderness  There is no left CVA tenderness, guarding or rebound  Hernia: No hernia is present  Musculoskeletal:         General: No swelling, tenderness, deformity or signs of injury  Right lower leg: No edema  Left lower leg: No edema  Skin:     Capillary Refill: Capillary refill takes less than 2 seconds  Coloration: Skin is not jaundiced or pale  Findings: No bruising, erythema, lesion or rash  Neurological:      Mental Status: He is alert  Cranial Nerves: No cranial nerve deficit  Sensory: No sensory deficit  Motor: No weakness  Gait: Gait normal       Deep Tendon Reflexes: Reflexes normal    Psychiatric:         Mood and Affect: Mood normal           Additional Data:     Labs:  Results from last 7 days   Lab Units 03/13/22  0843 03/10/22  0516 03/08/22  0535   WBC Thousand/uL 13 33*   < > 9 58   HEMOGLOBIN g/dL 14 0   < > 13 1   HEMATOCRIT % 41 1   < > 36 9   PLATELETS Thousands/uL 176   < > 215   NEUTROS PCT %  --   --  87*   LYMPHS PCT %  --   --  10*   MONOS PCT %  --   --  2*   EOS PCT %  --   --  0    < > = values in this interval not displayed       Results from last 7 days   Lab Units 03/13/22  0843 03/10/22  0232 03/08/22  0535   SODIUM mmol/L 137   < > 137   POTASSIUM mmol/L 4 1   < > 3 8   CHLORIDE mmol/L 101   < > 107   CO2 mmol/L 30   < > 26   BUN mg/dL 16   < > 13   CREATININE mg/dL 0 76   < > 0 82   ANION GAP mmol/L 6   < > 4   CALCIUM mg/dL 9 0 < > 9 4   ALBUMIN g/dL  --   --  3 4*   TOTAL BILIRUBIN mg/dL  --   --  0 58   ALK PHOS U/L  --   --  57   ALT U/L  --   --  18   AST U/L  --   --  16   GLUCOSE RANDOM mg/dL 148*   < > 156*    < > = values in this interval not displayed  Results from last 7 days   Lab Units 03/12/22  0639   INR  1 14     Results from last 7 days   Lab Units 03/13/22  1109 03/13/22  1005 03/13/22  0649 03/12/22  2033 03/12/22  1613 03/12/22  0914 03/12/22  0627 03/11/22  2200 03/11/22  1946 03/11/22  0701 03/10/22  2229 03/10/22  1801   POC GLUCOSE mg/dl 142* 153* 149* 125 115 136 154* 194* 196* 147* 143* 138               Lines/Drains:  Invasive Devices  Report    Peripheral Intravenous Line            Peripheral IV 03/11/22 Left Antecubital 2 days                      Imaging: No pertinent imaging reviewed      Recent Cultures (last 7 days):         Last 24 Hours Medication List:   Current Facility-Administered Medications   Medication Dose Route Frequency Provider Last Rate    acetaminophen  975 mg Oral [facility] Rachel Shaw PA-C      amLODIPine  5 mg Oral BID Micki Yeager MD      atorvastatin  40 mg Oral QPM Rachel Shaw PA-C      calcium carbonate  1,000 mg Oral Daily PRN Rachel Shaw PA-C      dexamethasone  2 mg Oral [facility] Rachel Shaw PA-C      Followed by   Evan Grant ON 3/15/2022] dexamethasone  2 mg Oral Q12H [address] [place] Claudia Haji PA-C      Followed by   Evan Grant ON 3/18/2022] dexamethasone  2 mg Oral Daily Rachel Shaw PA-C      docusate sodium  100 mg Oral BID Rachel Shaw PA-C      DULoxetine  60 mg Oral HS Rachel Shaw PA-C      flecainide  100 mg Oral BID Rachel Shaw PA-C      heparin (porcine)  5,000 Units Subcutaneous [facility] Janine Lloyd MD      hydrALAZINE  10 mg Intravenous Q8H PRN Janine Lloyd MD      hydrALAZINE  50 mg Oral [facility] Janine Lloyd MD      HYDROmorphone  0 2 mg Intravenous Q1H PRN Maximilian EVERETT FABIOLA Angulo      insulin lispro  1-5 Units Subcutaneous HS Margaret Bustos PA-C      insulin lispro  2-12 Units Subcutaneous 4 times day Margaret Bustos PA-C      methocarbamol  500 mg Oral Q6H [facility] Margaret Bustos PA-C      metoprolol succinate  100 mg Oral Daily Margaret Bustos PA-C      ondansetron  4 mg Intravenous Q6H PRN Margaret Bustos PA-C      oxyCODONE  2 5 mg Oral Q4H PRN Margaret Bustos PA-C      oxyCODONE  5 mg Oral Q4H PRN Margaret Bustos PA-C      pantoprazole  40 mg Oral Early Morning Margaret Bustos PA-C      senna  1 tablet Oral Daily Margaret Bustos PA-C          Today, Patient Was Seen By: Celio Ryan MD    **Please Note: This note may have been constructed using a voice recognition system  **

## 2022-03-13 NOTE — PLAN OF CARE
Problem: MOBILITY - ADULT  Goal: Maintain or return to baseline ADL function  Description: INTERVENTIONS:  -  Assess patient's ability to carry out ADLs; assess patient's baseline for ADL function and identify physical deficits which impact ability to perform ADLs (bathing, care of mouth/teeth, toileting, grooming, dressing, etc )  - Assess/evaluate cause of self-care deficits   - Assess range of motion  - Assess patient's mobility; develop plan if impaired  - Assess patient's need for assistive devices and provide as appropriate  - Encourage maximum independence but intervene and supervise when necessary  - Involve family in performance of ADLs  - Assess for home care needs following discharge   - Consider OT consult to assist with ADL evaluation and planning for discharge  - Provide patient education as appropriate  Outcome: Progressing  Goal: Maintains/Returns to pre admission functional level  Description: INTERVENTIONS:  - Perform BMAT or MOVE assessment daily    - Set and communicate daily mobility goal to care team and patient/family/caregiver     - Collaborate with rehabilitation services on mobility goals if consulted  - Out of bed for toileting  - Record patient progress and toleration of activity level   Outcome: Progressing     Problem: PAIN - ADULT  Goal: Verbalizes/displays adequate comfort level or baseline comfort level  Description: Interventions:  - Encourage patient to monitor pain and request assistance  - Assess pain using appropriate pain scale  - Administer analgesics based on type and severity of pain and evaluate response  - Implement non-pharmacological measures as appropriate and evaluate response  - Consider cultural and social influences on pain and pain management  - Notify physician/advanced practitioner if interventions unsuccessful or patient reports new pain  Outcome: Progressing     Problem: INFECTION - ADULT  Goal: Absence or prevention of progression during hospitalization  Description: INTERVENTIONS:  - Assess and monitor for signs and symptoms of infection  - Monitor lab/diagnostic results  - Monitor all insertion sites, i e  indwelling lines, tubes, and drains  - Monitor endotracheal if appropriate and nasal secretions for changes in amount and color  - [place] appropriate cooling/warming therapies per order  - Administer medications as ordered  - Instruct and encourage patient and family to use good hand hygiene technique  - Identify and instruct in appropriate isolation precautions for identified infection/condition  Outcome: Progressing  Goal: Absence of fever/infection during neutropenic period  Description: INTERVENTIONS:  - Monitor WBC    Outcome: Progressing     Problem: Nutrition/Hydration-ADULT  Goal: Nutrient/Hydration intake appropriate for improving, restoring or maintaining nutritional needs  Description: Monitor and assess patient's nutrition/hydration status for malnutrition  Collaborate with interdisciplinary team and initiate plan and interventions as ordered  Monitor patient's weight and dietary intake as ordered or per policy  Utilize nutrition screening tool and intervene as necessary  Determine patient's food preferences and provide high-protein, high-caloric foods as appropriate       INTERVENTIONS:  - Monitor oral intake, urinary output, labs, and treatment plans  - Assess nutrition and hydration status and recommend course of action  - Evaluate amount of meals eaten  - Assist patient with eating if necessary   - Allow adequate time for meals  - Recommend/ encourage appropriate diets, oral nutritional supplements, and vitamin/mineral supplements  - Order, calculate, and assess calorie counts as needed  - Recommend, monitor, and adjust tube feedings and TPN/PPN based on assessed needs  - Assess need for intravenous fluids  - Provide specific nutrition/hydration education as appropriate  - Include patient/family/caregiver in decisions related to nutrition  Outcome: Progressing

## 2022-03-13 NOTE — PROGRESS NOTES
[address] [facility]  Progress Note - Angeles Revel 1950, 71 y o  male MRN: [id number]  Unit/Bed#: [facility] 667-21 Encounter: [id number]  Primary Care Provider: Lon Jimenez MD   Date and time admitted to hospital: 3/5/2022  8:18 PM    * Cervical myelopathy ([address] )  Assessment & Plan  POD 2 Anterior cervical discectomy and fixation fusion C5/6 and C6/7; Posterior cervical decompression and instrumented fusion C3-T1  · Pre op- severe canal stenosis C5-6 with small area of signal abnormality   Presented with significant decline in ambulation along with numbness and weakness R>L over the last week  Reports multiple falls  Imaging:    Cervical spine x-ray 03/11/2022:ACDF C5-C7 and posterior fusion hardware from C3 through T1 as above  Plan:   Continue monitor neurological exam and symptoms   Vista collar to remain in place at all times, okay for [place] collar for showering   Continue Decadron 48hr taper   Given 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, prior anticoagulation (Coumadin) is currently on hold  Discussed with Dr Garcia, makenzie to start heparin drip without bolus on POD 3    Medical management and pain control per primary team   Drains:  o Anterior MARCIN drain with 25 mL/24hr with serosanguineous output, removed this morning without difficulty, Steri strips placed clean, dry, intact, patient tolerated well   o Posterior MARCIN drain with 75ml/24hr with serosanguineous output, removed this morning without difficulty, 1 suture tied down in place, patient tolerated well  New dressing applied to cervical incision  · Patient tolerating diet with no complaints with swallowing or with breathing  He reports some throat soreness    · Mobilize with PT/OT, recommending acute rehab  · Encouraged incentive spirometer  · DVT PPX: SCDs and okay for SQ heparin, held this morning's dose as well as 1400 dose secondary to removing anterior and posterior drains, okay for 2200 heparin    Neurosurgery will continue to follow closely, call with any further questions or concerns  Subjective/Objective     Chief Complaint: "I have some left sided neck pain today"    Subjective:  Patient does report developing some left-sided neck pain  He also reports some pulling sensations with movement of his neck  He reports his pain increases to a 6-7/10 with movement  Sitting not moving he has no pain  He denies any radiation of his neck pain  Patient does report his current pain regimen helps with his pain  He also reports a sore throat today but no difficulties with breathing or swallowing  He does endorse not really having an appetite  He denies having a BM but is passing gas  He reports he is voiding without difficulty  He denies any headaches, dizziness, blurry vision, chest pain, shortness of breath, abdominal pain, nausea, vomiting, diarrhea, no problems with bowel or bladder, no new weakness or numbness/tingling  Patient states he feels as though his right arm strength has improved  Objective:  Patient comfortably lying in bed with vista collar in place, NAD  I/O       03/11 0701 03/12 0700 03/12 0701 03/13 0700 03/13 0701 03/14 0700    P  O  240      I V  (mL/kg) 2300 (20 5)      IV Piggyback 500      Total Intake(mL/kg) 3040 (27 1)      Urine (mL/kg/hr) 1075 (0 4) 1100 (0 4)     Drains 355 100     Blood 200      Total Output 1630 1200     Net +1410 -1200                  Invasive Devices  Report    Peripheral Intravenous Line            Peripheral IV 03/11/22 Left Antecubital 1 day          Drain            Closed/Suction Drain Anterior Neck 7 Fr  1 day    Closed/Suction Drain Posterior Neck Bulb 7 Fr  1 day                Physical Exam:  Vitals: Blood pressure 162/83, pulse 57, temperature 97 9 °F (36 6 °C), resp  rate 16, weight 111 kg (244 lb 0 8 oz), SpO2 94 %  ,Body mass index is 34 04 kg/m²      General appearance: alert, appears stated age, cooperative and no distress  Head: Normocephalic, without obvious abnormality, atraumatic  Eyes: EOMI, PERRL, conjugate gaze  Neck:  Vista collar in place, anterior MARCIN drain removed without difficulty, Steri-Strips placed, clean, dry, intact  Posterior MARCIN drain removed, 1 suture tied down in place  New dressing placed on posterior cervical incision clean, dry, intact  Lungs: non labored breathing  Heart: regular heart rate  Neurologic:   Mental status: Alert, oriented x3, thought content appropriate, speech is clear, following commands  Cranial nerves: grossly intact (Cranial nerves II-XII)  Sensory: normal to light touch in all extremities x4, JPS and DST intact  Motor: moving all extremities, strength 5/5 throughout except right  4+/5 and right hip flexion 4+/5  Reflexes: 2+ and symmetric, right-sided Jiménez's noted bilateral clonus  Coordination: finger to nose normal bilaterally, no drift bilaterally      Lab Results:  Results from last 7 days   Lab Units 03/12/22  0638 03/11/22  0611 03/10/22  0516 03/08/22  0535 03/08/22  0535 03/07/22  0512 03/07/22  0512   WBC Thousand/uL 13 85* 10 06 9 40   < > 9 58   < > 7 20   HEMOGLOBIN g/dL 13 2 14 6 13 9   < > 13 1   < > 13 4   HEMATOCRIT % 37 4 41 7 39 7   < > 36 9   < > 41 1   PLATELETS Thousands/uL 182 237 237   < > 215   < > 220   NEUTROS PCT %  --   --   --   --  87*  --  82*   MONOS PCT %  --   --   --   --  2*  --  5    < > = values in this interval not displayed       Results from last 7 days   Lab Units 03/12/22  0639 03/11/22  2228 03/10/22  0232 03/08/22  0535 03/08/22  0535 03/07/22  0512 03/07/22  0512   POTASSIUM mmol/L 3 8 3 6 3 8   < > 3 8   < > 3 6   CHLORIDE mmol/L 102 102 104   < > 107   < > 106   CO2 mmol/L 29 26 30   < > 26   < > 24   BUN mg/dL 18 20 17   < > 13   < > 13   CREATININE mg/dL 0 96 0 88 0 87   < > 0 82   < > 0 68   CALCIUM mg/dL 9 3 9 7 9 5   < > 9 4   < > 9 3   ALK PHOS U/L  --   --   --   --  57  --  62   ALT U/L  --   --   --   --  18  -- 18   AST U/L  --   --   --   --  16  --  16    < > = values in this interval not displayed  Results from last 7 days   Lab Units 03/10/22  0232   MAGNESIUM mg/dL 2 0         Results from last 7 days   Lab Units 03/12/22  0639 03/11/22  0611 03/10/22  2013 03/10/22  0948 03/10/22  0516 03/10/22  0516   INR  1 14 1 04  --   --   --  1 22*   PTT seconds 21*  --  45* 147*   < > 157*    < > = values in this interval not displayed  No results found for: TROPONINT  ABG:No results found for: PHART, HPC6BVX, PO2ART, NMA1YHR, K6BCRCAC, BEART, SOURCE    Imaging Studies: I have personally reviewed pertinent reports  and I have personally reviewed pertinent films in PACS    MRI brain wo contrast    Result Date: 3/5/2022  Impression: No acute intracranial pathology  Mild chronic microangiopathy  Workstation performed: [id number]     MRI cervical spine w wo contrast    Result Date: 3/5/2022  Impression: No acute abnormality  Motion limited    Multilevel degenerative spondylosis progressed from 2013 most severe at C5-6 with severe canal stenosis and flattening of the cord  Small focus of T2 signal within the cord at C5-C6 may represent myelomalacia  Moderate canal stenosis at C3-4 mildly indents the cord without signal abnormality  Marked multilevel foraminal stenosis  No prevertebral edema or mass  Workstation performed: [id number]       EKG, Pathology, and Other Studies: I have personally reviewed pertinent reports        VTE Pharmacologic Prophylaxis: Heparin    VTE Mechanical Prophylaxis: sequential compression device

## 2022-03-13 NOTE — PROGRESS NOTES
Cardiology Progress Note - Kev Butt 71 y o  male MRN: [id number]    Unit/Bed#: [facility] 0-36 Encounter: [id number]      Assessment:  Principal Problem:    Cervical myelopathy ([facility] 75 )  Active Problems:    Essential hypertension    WILL (obstructive sleep apnea)    Factor V Leiden mutation ([facility] )    Paroxysmal A-fib (HCC)    Mixed hyperlipidemia    Ambulatory dysfunction    Sinus bradycardia    Goals of care, counseling/discussion      Plan:  Patient is comfortable this morning  He has no chest pain or significant dyspnea  He is postop day two after cervical surgery  Patient with history of PAF on Coumadin  Seen by Hematology previously in reference to factor five Leiden mutation with history of DVTs  Apparently to start IV heparin postop day three  With and start Coumadin when okay with neurosurgery  Amlodipine titrated yesterday in reference to elevated blood pressure  Would continue this regimen for now  From cardiac point of view he is stable  Please call if I can be of further assistance  Patient will follow with his primary cardiologist Dr Morena Campo as an outpatient  Subjective:   Patient seen and examined  No significant events overnight   negative  Objective:     Vitals: Blood pressure 162/83, pulse 57, temperature 97 9 °F (36 6 °C), resp  rate 16, weight 111 kg (244 lb 0 8 oz), SpO2 94 %  , Body mass index is 34 04 kg/m² ,   Orthostatic Blood Pressures      Most Recent Value   Blood Pressure 162/83 filed at 03/13/2022 0534   Patient Position - Orthostatic VS Lying filed at 03/12/2022 0857      ,      Intake/Output Summary (Last 24 hours) at 3/13/2022 0856  Last data filed at 3/13/2022 0345  Gross per 24 hour   Intake --   Output 1200 ml   Net -1200 ml             Physical Exam:    GEN: Kev Butt appears well, alert and oriented x 3, pleasant and cooperative   NECK: supple, no carotid bruits, no JVD or HJR  HEART: normal rate, regular rhythm, normal S1 and S2, no murmurs, clicks, gallops or rubs   LUNGS: clear to auscultation bilaterally; no wheezes, rales, or rhonchi   ABDOMEN: normal bowel sounds, soft, no tenderness, no distention  EXTREMITIES: peripheral pulses normal; no clubbing, cyanosis, or edema  SKIN: warm and well perfused, no suspicious lesions on exposed skin    Labs & Results:    No results displayed because visit has over 200 results  CTA head and neck with and without contrast    Result Date: 3/4/2022  Narrative: CTA NECK AND BRAIN WITH AND WITHOUT CONTRAST INDICATION: unsteady, right leg drift 4/5 strength x 3 weeks COMPARISON:   None  TECHNIQUE:  Routine CT imaging of the Brain without contrast   Post contrast imaging was performed after administration of iodinated contrast through the neck and brain  Post contrast axial 0 625 mm images timed to opacify the arterial system  3D rendering was performed on an independent workstation  MIP reconstructions performed  Coronal reconstructions were performed of the noncontrast portion of the brain  Radiation dose length product (DLP) for this visit:  988 76 832 mGy-cm   This examination, like all CT scans performed in the [facility], was performed utilizing techniques to minimize radiation dose exposure, including the use of iterative reconstruction and automated exposure control  IV Contrast:  85 mL of iohexol (OMNIPAQUE)  IMAGE QUALITY:   Diagnostic FINDINGS: NONCONTRAST BRAIN PARENCHYMA:  No intracranial mass, mass effect or midline shift  No CT signs of acute infarction  No acute parenchymal hemorrhage  VENTRICLES AND EXTRA-AXIAL SPACES:  Normal for the patient's age  VISUALIZED ORBITS AND PARANASAL SINUSES:  Unremarkable  CERVICAL VASCULATURE AORTIC ARCH AND GREAT VESSELS:  Normal aortic arch and great vessel origins  Normal visualized subclavian vessels  RIGHT VERTEBRAL ARTERY CERVICAL SEGMENT:  Moderate stenosis at the origin  The vessel is normal in caliber throughout the neck   LEFT VERTEBRAL ARTERY CERVICAL SEGMENT:  Normal origin  The vessel is normal in caliber throughout the neck  RIGHT EXTRACRANIAL CAROTID SEGMENT:  Mild atherosclerotic disease of the distal common carotid artery and proximal cervical internal carotid artery without significant stenosis compared to the more distal ICA  LEFT EXTRACRANIAL CAROTID SEGMENT:  Mild atherosclerotic disease of the distal common carotid artery and proximal cervical internal carotid artery without significant stenosis compared to the more distal ICA  NASCET criteria was used to determine the degree of internal carotid artery diameter stenosis  INTRACRANIAL VASCULATURE INTERNAL CAROTID ARTERIES:  Normal enhancement of the intracranial portions of the internal carotid arteries  Normal ophthalmic artery origins  Normal ICA terminus  ANTERIOR CIRCULATION:  Symmetric A1 segments and anterior cerebral arteries with normal enhancement  Normal anterior communicating artery  MIDDLE CEREBRAL ARTERY CIRCULATION:  M1 segment and middle cerebral artery branches demonstrate normal enhancement bilaterally  DISTAL VERTEBRAL ARTERIES:  Normal distal vertebral arteries  Posterior inferior cerebellar artery origins are normal  Normal vertebral basilar junction  BASILAR ARTERY:  Basilar artery is normal in caliber  Normal superior cerebellar arteries  POSTERIOR CEREBRAL ARTERIES: The right posterior cerebral artery arises from the basilar tip  There is fetal origin of the left posterior cerebral artery  Both demonstrate no focal stenosis  Normal posterior communicating arteries  VENOUS STRUCTURES:  Normal  NON VASCULAR ANATOMY BONY STRUCTURES:  No acute osseous abnormality  SOFT TISSUES OF THE NECK:  Normal  THORACIC INLET:  Unremarkable  Impression: 1  No evidence of acute intracranial hemorrhage  2  No evidence of occlusive disease  Workstation performed: [id number]     XR chest portable    Result Date: 3/4/2022  Narrative: CHEST INDICATION:   Chest Pain  COMPARISON:  1/23/2015 EXAM PERFORMED/VIEWS:  XR CHEST PORTABLE FINDINGS: Cardiomediastinal silhouette appears unremarkable  The lungs are clear  No pneumothorax or pleural effusion  Osseous structures appear within normal limits for patient age  Impression: No acute cardiopulmonary disease  Workstation performed: [id number]     XR cervical spine 2 or 3 views    Result Date: 3/12/2022  Narrative: CERVICAL SPINE INDICATION:   s/p cervical fusion  COMPARISON:  Fluoroscopy images from previous  VIEWS:  XR SPINE CERVICAL 2 OR 3 VW INJURY Images: 3 FINDINGS: The patient is status post anterior cervical fusion /discectomy from C5 through C7  Posterior laminectomy and fusion hardware from C3 to T1  Alignment appears anatomic  3 mm anterolisthesis C4 upon C5  Skin staples and surgical drain  Impression: ACDF C5-C7 and posterior fusion hardware from C3 through T1 as above  Workstation performed: [id number]     XR knee 4+ vw left injury    Result Date: 2/26/2022  Narrative: LEFT KNEE INDICATION:   M17 12: Unilateral primary osteoarthritis, left knee  COMPARISON:  Left knee x-ray 6/5/2020 VIEWS:  XR KNEE 4+ VW LEFT INJURY Images: 6 FINDINGS: There is no acute fracture or dislocation  There is no joint effusion  Tricompartmental osteoarthritis with severe narrowing of the medial tibiofemoral joint space and osteophytes seen in all 3 compartments  There is mild genu varus  No lytic or blastic osseous lesion  There are atherosclerotic calcifications  Soft tissues are otherwise unremarkable  Impression: Severe degenerative changes of left knee  Workstation performed: [id number]     XR knee 4+ vw right injury    Result Date: 2/26/2022  Narrative: RIGHT KNEE INDICATION:   M17 11: Unilateral primary osteoarthritis, right knee  COMPARISON:  Right knee x-ray 6/5/2020 VIEWS:  XR KNEE 4+ VW RIGHT INJURY Images: 5 FINDINGS: There is no acute fracture or dislocation  There is no joint effusion   Tricompartmental osteoarthritis with severe narrowing of the lateral tibiofemoral joint space and osteophytes seen in all 3 compartments  There is mild genu valgus  No lytic or blastic osseous lesion  There are atherosclerotic calcifications  Soft tissues are otherwise unremarkable  Impression: Severe degenerative changes of right knee  Workstation performed: [id number]     MRI brain wo contrast    Result Date: 3/5/2022  Narrative: MRI BRAIN WITHOUT CONTRAST INDICATION: Rule out CVA  COMPARISON: CTA performed yesterday, MRI dated 3/27/2021  TECHNIQUE:  Sagittal T1, axial T2, axial FLAIR, axial T1, axial Young Harris and axial diffusion imaging  IMAGE QUALITY:  Diagnostic  FINDINGS: BRAIN PARENCHYMA:  There is no discrete mass, mass effect or midline shift  There is no intracranial hemorrhage  There is no evidence of acute infarction and diffusion imaging is unremarkable  Small scattered hyperintensities on T2/FLAIR imaging are noted in the periventricular and subcortical white matter demonstrating an appearance that is statistically most likely to represent mild microangiopathic change  VENTRICLES:  Mild volume loss  No hydrocephalus  SELLA AND PITUITARY GLAND:  Normal  ORBITS:  Normal  PARANASAL SINUSES: Minimal ethmoid mucosal thickening  Minimal mastoid opacification is improved compared with the previous MRI  VASCULATURE:  Evaluation of the major intracranial vasculature demonstrates appropriate flow voids  CALVARIUM AND SKULL BASE:  Normal  EXTRACRANIAL SOFT TISSUES:  Normal      Impression: No acute intracranial pathology  Mild chronic microangiopathy  Workstation performed: [id number]     MRI cervical spine w wo contrast    Result Date: 3/5/2022  Narrative: MRI CERVICAL SPINE WITH AND WITHOUT CONTRAST INDICATION: CT findings, weakness  COMPARISON:  CT dated 3/4/2022 and MRI dated 3/15/2013  TECHNIQUE:  Sagittal T1, sagittal T2, sagittal inversion recovery, axial 2D merge and axial T2   Sagittal T1 and axial T1 postcontrast   IV Contrast:  11 mL of Gadobutrol injection (SINGLE-DOSE) IMAGE QUALITY:  Motion artifact, worse on axial imaging  FINDINGS: ALIGNMENT:  Degenerative grade 1 anterolisthesis at C5-C6 is unchanged  Minimal degenerative anterolisthesis at C4-5 is less apparent  Alignment is otherwise normal  MARROW SIGNAL: Multilevel endplate degenerative changes most pronounced at C5-6 with mixed Modic type I and type II changes  No suspicious marrow signal abnormality  CERVICAL AND VISUALIZED UPPER THORACIC CORD:  Mild increased signal within the cord at C5-6 is suggested on sagittal images that may represent myelomalacia rather than edema    This cannot be confirmed on motion limited axial images  Cord is otherwise normal in signal intensity  PREVERTEBRAL AND PARASPINAL SOFT TISSUES:  Normal  VISUALIZED POSTERIOR FOSSA:  The visualized posterior fossa demonstrates no abnormal signal  CERVICAL DISC SPACES:  Severe space narrowing at C5-6 and C6-7 is stable compared with CT but progressed from previous MRI  C2-C3:  Disc complex with facet and uncovertebral hypertrophy  Mild canal stenosis  Bilateral foraminal stenosis, mild on the right and moderate on the left  C3-C4:  Disc osteophyte complex with facet, uncovertebral and ligamentum flavum hypertrophy  Moderate canal stenosis  Slight flattening of the cord  Severe bilateral foraminal stenosis  C4-C5:  Small disc osteophyte complex, facet and uncovertebral hypertrophy  Mild to moderate canal stenosis  Bilateral foraminal stenosis  C5-C6: Limited evaluation on axial imaging  Discogenic complex with facet, uncovertebral and ligamentum flavum hypertrophy  Severe canal stenosis with flattening the cord has progressed since 2013  Bilateral foraminal stenosis  C6-C7:  Disc osteophyte complex with facet and uncovertebral hypertrophy  Mild canal stenosis  Bilateral foraminal stenosis right worse than left  C7-T1:  No disc herniation or canal stenosis   Facet arthropathy with at least mild foraminal stenosis  UPPER THORACIC DISC SPACES:  Small disc osteophyte complexes that mildly indent the thecal sac without significant canal stenosis  Facet arthropathy with foraminal stenosis most pronounced at T1-T2  POSTCONTRAST IMAGING:  Normal      Impression: No acute abnormality  Motion limited    Multilevel degenerative spondylosis progressed from 2013 most severe at C5-6 with severe canal stenosis and flattening of the cord  Small focus of T2 signal within the cord at C5-C6 may represent myelomalacia  Moderate canal stenosis at C3-4 mildly indents the cord without signal abnormality  Marked multilevel foraminal stenosis  No prevertebral edema or mass  Workstation performed: [id number]     XR bone length (scanogram)    Result Date: 2/26/2022  Narrative: LEG LENGTH STUDY INDICATION:   M17 11: Unilateral primary osteoarthritis, right knee  COMPARISON:  None VIEWS:  XR BONE LENGTH (SCANOGRAM) Images: 7 FINDINGS: No fractures or pathologic lesions  Degenerative changes of bilateral knees  Bone length measurements are as follows: Right femur from femoral head to medial femoral condyle = 50 6 cm Left femur from femoral head to medial femoral condyle = 51 0 cm Right tibia from medial tibial plateau to the tibial plafond = 36 9 cm Left tibia from medial tibial plateau to the tibial plafond = 36 6 cm Total right leg length (including knee joint space) from femoral head to tibial plafond = 87 5 cm Total left leg length (including knee joint space) from femoral head to tibial plafond = 87 8 cm     Impression: Leg length measurements as above  Workstation performed: [id number]     CT recon only cervical spine (No Charge)    Result Date: 3/4/2022  Narrative: CT CERVICAL SPINE - WITHOUT CONTRAST INDICATION:   Neck pain, acute, no red flags na RUE numbness/tingling, neck pain  COMPARISON:  None   TECHNIQUE:  CT examination of the cervical spine was performed without intravenous contrast   Contiguous axial images were obtained  Sagittal and coronal reconstructions were performed  Radiation dose length product (DLP) for this visit:  0 mGy-cm   This examination, like all CT scans performed in the [facility], was performed utilizing techniques to minimize radiation dose exposure, including the use of iterative reconstruction and automated exposure control  IMAGE QUALITY:  Diagnostic  FINDINGS: ALIGNMENT:  Grade 1 anterolisthesis of C4 on C5 possibly from degenerative facet arthrosis VERTEBRAL BODIES:  No fracture  Suspect asymmetric soft tissue in the right paraspinal region  Correlate with contrast-enhanced cervical spine MRI  DEGENERATIVE CHANGES:  Multilevel facet severe arthritic changes causing multilevel neural foraminal narrowing at nearly all levels  Disc osteophyte complex and posterior endplate osteophytes causing spinal canal stenosis at C5-C6 and C6-C7  Complete loss of the disc space at C5-C6 and C6-C7 where there is endplate erosive changes most likely degenerative in the absence of suspected infection  THORACIC INLET:  Normal      Impression: Multilevel degenerative changes causing multilevel neural foraminal and spinal canal stenosis as described  Suspect asymmetric soft tissue in the right paraspinal region  Correlate with contrast-enhanced cervical spine MRI recommended to exclude paraspinal soft tissue infection  Workstation performed: [id number]     Echo follow up/limited w/ contrast if indicated    Result Date: 3/7/2022  Narrative: Mallory Fonseca  Left Ventricle: Left ventricular cavity size is normal  Wall thickness is normal  The left ventricular ejection fraction is 60%  Systolic function is normal  Wall motion is normal  Diastolic function is moderately abnormal, consistent with grade II (pseudonormal) relaxation    Left Atrium: The atrium is moderately dilated    Right Atrium: The atrium is mildly dilated    Aortic Valve: The aortic valve is trileaflet  The leaflets are moderately calcified  There is moderately reduced mobility  There is trace regurgitation  There is moderate stenosis  Mean gradiet 17 mmHg, EDELMIRA 1 3 cm2    Mitral Valve: There is mild regurgitation    Tricuspid Valve: There is mild regurgitation  EKG personally reviewed by Leslee Grace MD      Counseling / Coordination of Care  Total floor / unit time spent today 30 minutes  Greater than 50% of total time was spent with the patient and / or family counseling and / or coordination of care

## 2022-03-13 NOTE — RESTORATIVE TECHNICIAN NOTE
Restorative Technician Note      Patient Name: Nancy Grant     Note Type: Bracing, Follow-up fitting  Patient Position Upon Consult: Bedside chair  Brace Applied: [address] ([place] [place])  Education Provided: Yes; Family or social support of family present for education by provider  Patient Position at End of Consult: Bedside chair          Please call Mobility Coordinator at ext  [phone] in regards to bracing instruction and/or adjustment    Sherron Back Restorative Technician, BS

## 2022-03-13 NOTE — PLAN OF CARE
Problem: MOBILITY - ADULT  Goal: Maintain or return to baseline ADL function  Description: INTERVENTIONS:  -  Assess patient's ability to carry out ADLs; assess patient's baseline for ADL function and identify physical deficits which impact ability to perform ADLs (bathing, care of mouth/teeth, toileting, grooming, dressing, etc )  - Assess/evaluate cause of self-care deficits   - Assess range of motion  - Assess patient's mobility; develop plan if impaired  - Assess patient's need for assistive devices and provide as appropriate  - Encourage maximum independence but intervene and supervise when necessary  - Involve family in performance of ADLs  - Assess for home care needs following discharge   - Consider OT consult to assist with ADL evaluation and planning for discharge  - Provide patient education as appropriate  Outcome: Progressing  Goal: Maintains/Returns to pre admission functional level  Description: INTERVENTIONS:  - Perform BMAT or MOVE assessment daily    - Set and communicate daily mobility goal to care team and patient/family/caregiver     - Collaborate with rehabilitation services on mobility goals if consulted  - Dangle patient 3 times a day  - Stand patient 3 times a day  - Ambulate patient 3 times a day  - Out of bed to chair 3 times a day   - Out of bed for meals 3 times a day  - Out of bed for toileting  - Record patient progress and toleration of activity level   Outcome: Progressing     Problem: PAIN - ADULT  Goal: Verbalizes/displays adequate comfort level or baseline comfort level  Description: Interventions:  - Encourage patient to monitor pain and request assistance  - Assess pain using appropriate pain scale  - Administer analgesics based on type and severity of pain and evaluate response  - Implement non-pharmacological measures as appropriate and evaluate response  - Notify physician/advanced practitioner if interventions unsuccessful or patient reports new pain  Outcome: Progressing Problem: INFECTION - ADULT  Goal: Absence or prevention of progression during hospitalization  Description: INTERVENTIONS:  - Assess and monitor for signs and symptoms of infection  - Monitor lab/diagnostic results  - Monitor all insertion sites, i e  indwelling lines, tubes, and drains  - [place] appropriate cooling/warming therapies per order  - Administer medications as ordered  - Instruct and encourage patient and family to use good hand hygiene technique  - Identify and instruct in appropriate isolation precautions for identified infection/condition  Outcome: Progressing     Problem: SAFETY ADULT  Goal: Maintain or return to baseline ADL function  Description: INTERVENTIONS:  -  Assess patient's ability to carry out ADLs; assess patient's baseline for ADL function and identify physical deficits which impact ability to perform ADLs (bathing, care of mouth/teeth, toileting, grooming, dressing, etc )  - Assess/evaluate cause of self-care deficits   - Assess range of motion  - Assess patient's mobility; develop plan if impaired  - Assess patient's need for assistive devices and provide as appropriate  - Encourage maximum independence but intervene and supervise when necessary  - Involve family in performance of ADLs  - Assess for home care needs following discharge   - Consider OT consult to assist with ADL evaluation and planning for discharge  - Provide patient education as appropriate  Outcome: Progressing  Goal: Maintains/Returns to pre admission functional level  Description: INTERVENTIONS:  - Perform BMAT or MOVE assessment daily    - Set and communicate daily mobility goal to care team and patient/family/caregiver     - Collaborate with rehabilitation services on mobility goals if consulted  - Dangle patient 3 times a day  - Stand patient 3 times a day  - Ambulate patient 3 times a day  - Out of bed to chair 3 times a day   - Out of bed for meals 3 times a day  - Out of bed for toileting  - Record patient progress and toleration of activity level   Outcome: Progressing  Goal: Patient will remain free of falls  Description: INTERVENTIONS:  - Educate patient/family on patient safety including physical limitations  - Instruct patient to call for assistance with activity   - Consult OT/PT to assist with strengthening/mobility   - Keep Call bell within reach  - Keep bed low and locked with side rails adjusted as appropriate  - Keep care items and personal belongings within reach  - Initiate and maintain comfort rounds  - Make Fall Risk Sign visible to staff  - Offer Toileting every 2 Hours, in advance of need  - Initiate/Maintain bed/chair alarm  - Obtain necessary fall risk management equipment  - Apply yellow socks and bracelet for high fall risk patients  - Consider moving patient to room near nurses station  Outcome: Progressing     Problem: DISCHARGE PLANNING  Goal: Discharge to home or other facility with appropriate resources  Description: INTERVENTIONS:  - Identify barriers to discharge w/patient and caregiver  - Arrange for needed discharge resources and transportation as appropriate  - Identify discharge learning needs (meds, wound care, etc )  - Refer to Case Management Department for coordinating discharge planning if the patient needs post-hospital services based on physician/advanced practitioner order or complex needs related to functional status, cognitive ability, or social support system  Outcome: Progressing     Problem: Knowledge Deficit  Goal: Patient/family/caregiver demonstrates understanding of disease process, treatment plan, medications, and discharge instructions  Description: Complete learning assessment and assess knowledge base    Interventions:  - Provide teaching at level of understanding  - Provide teaching via preferred learning methods  Outcome: Progressing     Problem: Neurological Deficit  Goal: Neurological status is stable or improving  Description: Interventions:  - Monitor and assess patient's level of consciousness, motor function, sensory function, and level of assistance needed for ADLs  - Monitor and report changes from baseline  Collaborate with interdisciplinary team to initiate plan and implement interventions as ordered  - Provide and maintain a safe environment  - Consider seizure precautions  - Consider fall precautions  - Consider aspiration precautions  - Consider bleeding precautions  Outcome: Progressing     Problem: Activity Intolerance/Impaired Mobility  Goal: Mobility/activity is maintained at optimum level for patient  Description: Interventions:  - Assess and monitor patient  barriers to mobility and need for assistive/adaptive devices  - Assess patient's emotional response to limitations  - Collaborate with interdisciplinary team and initiate plans and interventions as ordered  - Encourage independent activity per ability   - Maintain proper body alignment  - Perform active/passive rom as tolerated/ordered  - Plan activities to conserve energy   - Turn patient as appropriate  Outcome: Progressing     Problem: Communication Impairment  Goal: Ability to express needs and understand communication  Description: Assess patient's communication skills and ability to understand information  Patient will demonstrate use of effective communication techniques, alternative methods of communication and understanding even if not able to speak  - Encourage communication and provide alternate methods of communication as needed  - Collaborate with case management/ for discharge needs  - Include patient/family/caregiver in decisions related to communication  Outcome: Progressing     Problem: Potential for Aspiration  Goal: Non-ventilated patient's risk of aspiration is minimized  Description: Assess and monitor vital signs, respiratory status, and labs (WBC)  Monitor for signs of aspiration (tachypnea, cough, rales, wheezing, cyanosis, fever)      - Assess and monitor patient's ability to swallow  - Place patient up in chair to eat if possible  - HOB up at 90 degrees to eat if unable to get patient up into chair   - Supervise patient during oral intake  - Instruct patient/ family to take small bites  - Instruct patient/ family to take small single sips when taking liquids  - Follow patient-specific strategies generated by speech pathologist   Outcome: Progressing     Problem: Nutrition  Goal: Nutrition/Hydration status is improving  Description: Monitor and assess patient's nutrition/hydration status for malnutrition (ex- brittle hair, bruises, dry skin, pale skin and conjunctiva, muscle wasting, smooth red tongue, and disorientation)  Collaborate with interdisciplinary team and initiate plan and interventions as ordered  Monitor patient's weight and dietary intake as ordered or per policy  Utilize nutrition screening tool and intervene per policy  Determine patient's food preferences and provide high-protein, high-caloric foods as appropriate  - Assist patient with eating   - Allow adequate time for meals   - Encourage patient to take dietary supplement as ordered  - Collaborate with clinical nutritionist   - Include patient/family/caregiver in decisions related to nutrition  Outcome: Progressing     Problem: Nutrition/Hydration-ADULT  Goal: Nutrient/Hydration intake appropriate for improving, restoring or maintaining nutritional needs  Description: Monitor and assess patient's nutrition/hydration status for malnutrition  Collaborate with interdisciplinary team and initiate plan and interventions as ordered  Monitor patient's weight and dietary intake as ordered or per policy  Utilize nutrition screening tool and intervene as necessary  Determine patient's food preferences and provide high-protein, high-caloric foods as appropriate       INTERVENTIONS:  - Monitor oral intake, urinary output, labs, and treatment plans  - Assess nutrition and hydration status and recommend course of action  - Evaluate amount of meals eaten  - Assist patient with eating if necessary   - Allow adequate time for meals  - Recommend/ encourage appropriate diets, oral nutritional supplements, and vitamin/mineral supplements  - Order, calculate, and assess calorie counts as needed  - Assess need for intravenous fluids  - Provide specific nutrition/hydration education as appropriate  - Include patient/family/caregiver in decisions related to nutrition  Outcome: Progressing     Problem: Potential for Falls  Goal: Patient will remain free of falls  Description: INTERVENTIONS:  - Educate patient/family on patient safety including physical limitations  - Instruct patient to call for assistance with activity   - Consult OT/PT to assist with strengthening/mobility   - Keep Call bell within reach  - Keep bed low and locked with side rails adjusted as appropriate  - Keep care items and personal belongings within reach  - Initiate and maintain comfort rounds  - Make Fall Risk Sign visible to staff  - Offer Toileting every 2 Hours, in advance of need  - Initiate/Maintain bed/chair alarm  - Obtain necessary fall risk management equipment    - Apply yellow socks and bracelet for high fall risk patients  - Consider moving patient to room near nurses station  Outcome: Progressing     Problem: Prexisting or High Potential for Compromised Skin Integrity  Goal: Skin integrity is maintained or improved  Description: INTERVENTIONS:  - Identify patients at risk for skin breakdown  - Assess and monitor skin integrity  - Assess and monitor nutrition and hydration status  - Monitor labs   - Assess for incontinence   - Turn and reposition patient  - Assist with mobility/ambulation  - Relieve pressure over bony prominences  - Avoid friction and shearing  - Provide appropriate hygiene as needed including keeping skin clean and dry  - Evaluate need for skin moisturizer/barrier cream  - Collaborate with interdisciplinary team   - Patient/family teaching  - Consider wound care consult   Outcome: Progressing

## 2022-03-14 PROBLEM — M62.838 MUSCLE SPASM: Status: ACTIVE | Noted: 2022-03-14

## 2022-03-14 LAB
ANION GAP SERPL CALCULATED.3IONS-SCNC: 7 MMOL/L (ref 4–13)
APTT PPP: 25 SECONDS (ref 23–37)
APTT PPP: 93 SECONDS (ref 23–37)
BUN SERPL-MCNC: 17 MG/DL (ref 5–25)
CALCIUM SERPL-MCNC: 9.1 MG/DL (ref 8.3–10.1)
CHLORIDE SERPL-SCNC: 98 MMOL/L (ref 100–108)
CO2 SERPL-SCNC: 30 MMOL/L (ref 21–32)
CREAT SERPL-MCNC: 0.81 MG/DL (ref 0.6–1.3)
ERYTHROCYTE [DISTWIDTH] IN BLOOD BY AUTOMATED COUNT: 13.2 % (ref 11.6–15.1)
GFR SERPL CREATININE-BSD FRML MDRD: 89 ML/MIN/1.73SQ M
GLUCOSE SERPL-MCNC: 109 MG/DL (ref 65–140)
GLUCOSE SERPL-MCNC: 118 MG/DL (ref 65–140)
GLUCOSE SERPL-MCNC: 123 MG/DL (ref 65–140)
GLUCOSE SERPL-MCNC: 144 MG/DL (ref 65–140)
GLUCOSE SERPL-MCNC: 177 MG/DL (ref 65–140)
HCT VFR BLD AUTO: 41 % (ref 36.5–49.3)
HGB BLD-MCNC: 13.6 G/DL (ref 12–17)
INR PPP: 1.06 (ref 0.84–1.19)
MCH RBC QN AUTO: 30.6 PG (ref 26.8–34.3)
MCHC RBC AUTO-ENTMCNC: 33.2 G/DL (ref 31.4–37.4)
MCV RBC AUTO: 92 FL (ref 82–98)
PLATELET # BLD AUTO: 160 THOUSANDS/UL (ref 149–390)
PMV BLD AUTO: 9.9 FL (ref 8.9–12.7)
POTASSIUM SERPL-SCNC: 3.7 MMOL/L (ref 3.5–5.3)
PROTHROMBIN TIME: 13.4 SECONDS (ref 11.6–14.5)
RBC # BLD AUTO: 4.45 MILLION/UL (ref 3.88–5.62)
SODIUM SERPL-SCNC: 135 MMOL/L (ref 136–145)
WBC # BLD AUTO: 10.59 THOUSAND/UL (ref 4.31–10.16)

## 2022-03-14 PROCEDURE — 85610 PROTHROMBIN TIME: CPT | Performed by: INTERNAL MEDICINE

## 2022-03-14 PROCEDURE — 85730 THROMBOPLASTIN TIME PARTIAL: CPT | Performed by: INTERNAL MEDICINE

## 2022-03-14 PROCEDURE — 99024 POSTOP FOLLOW-UP VISIT: CPT | Performed by: NEUROLOGICAL SURGERY

## 2022-03-14 PROCEDURE — 99232 SBSQ HOSP IP/OBS MODERATE 35: CPT | Performed by: INTERNAL MEDICINE

## 2022-03-14 PROCEDURE — 80048 BASIC METABOLIC PNL TOTAL CA: CPT | Performed by: INTERNAL MEDICINE

## 2022-03-14 PROCEDURE — 85027 COMPLETE CBC AUTOMATED: CPT | Performed by: INTERNAL MEDICINE

## 2022-03-14 PROCEDURE — 82948 REAGENT STRIP/BLOOD GLUCOSE: CPT

## 2022-03-14 RX ORDER — CYCLOBENZAPRINE HCL 10 MG
5 TABLET ORAL 3 TIMES DAILY PRN
Status: DISCONTINUED | OUTPATIENT
Start: 2022-03-14 | End: 2022-03-18 | Stop reason: HOSPADM

## 2022-03-14 RX ORDER — HEPARIN SODIUM 1000 [USP'U]/ML
4400 INJECTION, SOLUTION INTRAVENOUS; SUBCUTANEOUS
Status: DISCONTINUED | OUTPATIENT
Start: 2022-03-14 | End: 2022-03-18 | Stop reason: HOSPADM

## 2022-03-14 RX ORDER — HEPARIN SODIUM 1000 [USP'U]/ML
8800 INJECTION, SOLUTION INTRAVENOUS; SUBCUTANEOUS
Status: DISCONTINUED | OUTPATIENT
Start: 2022-03-14 | End: 2022-03-18 | Stop reason: HOSPADM

## 2022-03-14 RX ORDER — WARFARIN SODIUM 3 MG/1
3 TABLET ORAL
Status: COMPLETED | OUTPATIENT
Start: 2022-03-14 | End: 2022-03-14

## 2022-03-14 RX ADMIN — METOPROLOL SUCCINATE 100 MG: 100 TABLET, EXTENDED RELEASE ORAL at 09:00

## 2022-03-14 RX ADMIN — FLECAINIDE ACETATE 100 MG: 100 TABLET ORAL at 09:01

## 2022-03-14 RX ADMIN — FLECAINIDE ACETATE 100 MG: 100 TABLET ORAL at 17:32

## 2022-03-14 RX ADMIN — DOCUSATE SODIUM 100 MG: 100 CAPSULE ORAL at 09:00

## 2022-03-14 RX ADMIN — WARFARIN SODIUM 3 MG: 3 TABLET ORAL at 17:32

## 2022-03-14 RX ADMIN — PANTOPRAZOLE SODIUM 40 MG: 40 TABLET, DELAYED RELEASE ORAL at 05:43

## 2022-03-14 RX ADMIN — HEPARIN SODIUM 5000 UNITS: 5000 INJECTION INTRAVENOUS; SUBCUTANEOUS at 05:43

## 2022-03-14 RX ADMIN — DEXAMETHASONE 2 MG: 2 TABLET ORAL at 14:14

## 2022-03-14 RX ADMIN — METHOCARBAMOL 500 MG: 500 TABLET, FILM COATED ORAL at 23:17

## 2022-03-14 RX ADMIN — AMLODIPINE BESYLATE 5 MG: 5 TABLET ORAL at 09:00

## 2022-03-14 RX ADMIN — DEXAMETHASONE 2 MG: 2 TABLET ORAL at 21:03

## 2022-03-14 RX ADMIN — ATORVASTATIN CALCIUM 40 MG: 40 TABLET, FILM COATED ORAL at 17:33

## 2022-03-14 RX ADMIN — AMLODIPINE BESYLATE 5 MG: 5 TABLET ORAL at 21:09

## 2022-03-14 RX ADMIN — METHOCARBAMOL 500 MG: 500 TABLET, FILM COATED ORAL at 05:43

## 2022-03-14 RX ADMIN — DEXAMETHASONE 2 MG: 2 TABLET ORAL at 05:43

## 2022-03-14 RX ADMIN — HYDRALAZINE HYDROCHLORIDE 50 MG: 50 TABLET ORAL at 21:10

## 2022-03-14 RX ADMIN — METHOCARBAMOL 500 MG: 500 TABLET, FILM COATED ORAL at 17:32

## 2022-03-14 RX ADMIN — HYDRALAZINE HYDROCHLORIDE 50 MG: 50 TABLET ORAL at 05:43

## 2022-03-14 RX ADMIN — HEPARIN SODIUM 16 UNITS/KG/HR: 10000 INJECTION, SOLUTION INTRAVENOUS; SUBCUTANEOUS at 23:13

## 2022-03-14 RX ADMIN — STANDARDIZED SENNA CONCENTRATE 8.6 MG: 8.6 TABLET ORAL at 09:00

## 2022-03-14 RX ADMIN — DOCUSATE SODIUM 100 MG: 100 CAPSULE ORAL at 17:32

## 2022-03-14 RX ADMIN — METHOCARBAMOL 500 MG: 500 TABLET, FILM COATED ORAL at 11:15

## 2022-03-14 RX ADMIN — ACETAMINOPHEN 325MG 975 MG: 325 TABLET ORAL at 14:14

## 2022-03-14 RX ADMIN — ACETAMINOPHEN 325MG 975 MG: 325 TABLET ORAL at 21:04

## 2022-03-14 RX ADMIN — DULOXETINE HYDROCHLORIDE 60 MG: 60 CAPSULE, DELAYED RELEASE ORAL at 21:04

## 2022-03-14 RX ADMIN — METHOCARBAMOL 500 MG: 500 TABLET, FILM COATED ORAL at 00:25

## 2022-03-14 RX ADMIN — HEPARIN SODIUM 18 UNITS/KG/HR: 10000 INJECTION, SOLUTION INTRAVENOUS; SUBCUTANEOUS at 11:19

## 2022-03-14 RX ADMIN — HYDRALAZINE HYDROCHLORIDE 50 MG: 50 TABLET ORAL at 14:14

## 2022-03-14 RX ADMIN — ACETAMINOPHEN 325MG 975 MG: 325 TABLET ORAL at 05:43

## 2022-03-14 NOTE — PROGRESS NOTES
[address] [facility]  Progress Note - Kirsten [facility] 1950, 71 y o  male MRN: [id number]  Unit/Bed#: [facility] 713-01 Encounter: [id number]  Primary Care Provider: Jacqueline Palmer MD   Date and time admitted to hospital: 3/5/2022  8:18 PM    * Cervical myelopathy [facility])  Assessment & Plan  · S/p Anterior cervical discectomy and fixation fusion C5/6 and C6/7; Posterior cervical decompression and instrumented fusion C3-T1  Plan  · Neurosurgery following  · On Decadron taper  · PT/OT        Paroxysmal [facility]fib ([facility] )  Assessment & Plan  · On Coumadin at home  · Continue heart rate control medications  · POD 3, Restarting Coumadin along with heparin gtt without bolus to bridge to coumadin  · Daily INR    Muscle spasm  Assessment & Plan  · Complaining of muscle spasm of left shoulder due to the collar  Plan  · Trial of flexiril    Leukocytosis  Assessment & Plan  · Afebrile and hemodynamically stable  · Likely due to steroid use  · Continue to monitor    Goals of care, counseling/discussion  Assessment & Plan  Discussed code status with patient and his spouse who was present on the phone during the time of the conversation  Patient continues to stay that he wishes to be DNR DNI  He understands that the DNI order will need to be rescinded prior to procedure if he requires intubation and reinstated after the procedure  He agrees and also does his wife  Sinus bradycardia  Assessment & Plan  · Sinus bradycardia on EKG  · Patient asymptomatic  · Cardiology following    Ambulatory dysfunction  Assessment & Plan  · Management as above  · PT OT      Mixed hyperlipidemia  Assessment & Plan  · Continue statin    Factor V Leiden mutation ([facility] )  Assessment & Plan  · On Coumadin outpatient  · As above    WILL (obstructive sleep apnea)  Assessment & Plan  · Continue CPAP at night      Essential hypertension  Assessment & Plan  · Uncontrolled, likely due to steroid use; improved with addition of hydralazine and increased amlodipine  · Continue home blood pressure meds   · Cardiology following          VTE Pharmacologic Prophylaxis: VTE Score: 3 Moderate Risk (Score 3-4) - Pharmacological DVT Prophylaxis Ordered: heparin  Patient Centered Rounds: I performed bedside rounds with nursing staff today  Discussions with Specialists or Other Care Team Provider: discussed with neurosurgery and let them know that they can freely change steroids as needed  Education and Discussions with Family / Patient: called wife Katie Urbano   Time Spent for Care: 30 minutes  More than 50% of total time spent on counseling and coordination of care as described above  Current Length of Stay: 9 day(s)  Current Patient Status: Inpatient   Certification Statement: The patient will continue to require additional inpatient hospital stay due to IV steroids and surgical intervention   Discharge Plan: as per neurosurgery     Code Status: Level 3 - DNAR and DNI    Subjective:   Seen for follow up for right sided weakness and cervical myelopathy  Status post neurosurgical intervention, fusion and diskectomy; postop day 3  Complaining of muscle spasm in left neck, shoulder due to collar  AC restarted today    Objective:     Vitals:   Temp (24hrs), Av 8 °F (36 6 °C), Min:97 5 °F (36 4 °C), Max:98 2 °F (36 8 °C)    Temp:  [97 5 °F (36 4 °C)-98 2 °F (36 8 °C)] 97 5 °F (36 4 °C)  HR:  [54-58] 58  Resp:  [16-18] 18  BP: (140-156)/(74-81) 141/75  SpO2:  [96 %-99 %] 99 %  Body mass index is 34 04 kg/m²  Input and Output Summary (last 24 hours): Intake/Output Summary (Last 24 hours) at 3/14/2022 1148  Last data filed at 3/14/2022 0101  Gross per 24 hour   Intake 340 ml   Output 600 ml   Net -260 ml       Physical Exam:   Physical Exam  Constitutional:       General: He is not in acute distress  Appearance: He is not ill-appearing or toxic-appearing  HENT:      Head: Normocephalic        Nose: Nose normal       Mouth/Throat:      Mouth: Mucous membranes are moist    Eyes:      General: No scleral icterus  Right eye: No discharge  Left eye: No discharge  Pupils: Pupils are equal, round, and reactive to light  Cardiovascular:      Rate and Rhythm: Normal rate  Pulmonary:      Effort: Pulmonary effort is normal  No respiratory distress  Breath sounds: No stridor  No wheezing, rhonchi or rales  Chest:      Chest wall: No tenderness  Abdominal:      General: Abdomen is flat  There is no distension  Palpations: There is no mass  Tenderness: There is no abdominal tenderness  There is no left CVA tenderness, guarding or rebound  Hernia: No hernia is present  Musculoskeletal:         General: No swelling, tenderness, deformity or signs of injury  Right lower leg: No edema  Left lower leg: No edema  Skin:     Capillary Refill: Capillary refill takes less than 2 seconds  Coloration: Skin is not jaundiced or pale  Findings: No bruising, erythema, lesion or rash  Neurological:      Mental Status: He is alert  Cranial Nerves: No cranial nerve deficit  Sensory: No sensory deficit  Motor: No weakness  Gait: Gait normal       Deep Tendon Reflexes: Reflexes normal    Psychiatric:         Mood and Affect: Mood normal           Additional Data:     Labs:  Results from last 7 days   Lab Units 03/14/22  0821 03/10/22  0516 03/08/22  0535   WBC Thousand/uL 10 59*   < > 9 58   HEMOGLOBIN g/dL 13 6   < > 13 1   HEMATOCRIT % 41 0   < > 36 9   PLATELETS Thousands/uL 160   < > 215   NEUTROS PCT %  --   --  87*   LYMPHS PCT %  --   --  10*   MONOS PCT %  --   --  2*   EOS PCT %  --   --  0    < > = values in this interval not displayed       Results from last 7 days   Lab Units 03/14/22  0821 03/10/22  0232 03/08/22  0535   SODIUM mmol/L 135*   < > 137   POTASSIUM mmol/L 3 7   < > 3 8   CHLORIDE mmol/L 98*   < > 107   CO2 mmol/L 30   < > 26   BUN mg/dL 17   < > 13   CREATININE mg/dL 0 81   < > 0 82   ANION GAP mmol/L 7   < > 4   CALCIUM mg/dL 9 1   < > 9 4   ALBUMIN g/dL  --   --  3 4*   TOTAL BILIRUBIN mg/dL  --   --  0 58   ALK PHOS U/L  --   --  57   ALT U/L  --   --  18   AST U/L  --   --  16   GLUCOSE RANDOM mg/dL 177*   < > 156*    < > = values in this interval not displayed  Results from last 7 days   Lab Units 03/14/22  0821   INR  1 06     Results from last 7 days   Lab Units 03/14/22  0645 03/13/22  2056 03/13/22  1535 03/13/22  1109 03/13/22  1005 03/13/22  0649 03/12/22  2033 03/12/22  1613 03/12/22  0914 03/12/22  0627 03/11/22  2200 03/11/22  1946   POC GLUCOSE mg/dl 144* 126 119 142* 153* 149* 125 115 136 154* 194* 196*               Lines/Drains:  Invasive Devices  Report    Peripheral Intravenous Line            Peripheral IV 03/13/22 Right Arm <1 day                      Imaging: No pertinent imaging reviewed      Recent Cultures (last 7 days):         Last 24 Hours Medication List:   Current Facility-Administered Medications   Medication Dose Route Frequency Provider Last Rate    acetaminophen  975 mg Oral [facility] VEENA Hernández-C      amLODIPine  5 mg Oral BID Dick Remy MD      atorvastatin  40 mg Oral QPM Wei Royal, PA-C      calcium carbonate  1,000 mg Oral Daily PRN Wei VEENA Paige-C      cyclobenzaprine  5 mg Oral TID PRN Alvaro Saenz MD      dexamethasone  2 mg Oral [facility] TR HernándezC      Followed by   Montrell Hampton ON 3/15/2022] dexamethasone  2 mg Oral Q12H [facility] Lawrence Leonardo PA-C      Followed by   Montrell Hampton ON 3/18/2022] dexamethasone  2 mg Oral Daily Wei VEENA Paige-ALTAGRACIA      docusate sodium  100 mg Oral BID Wei Grogina, PA-ALTAGRACIA      DULoxetine  60 mg Oral HS Wei Grogina, PA-C      flecainide  100 mg Oral BID Wei Grogina, PA-C      heparin (porcine)  3-30 Units/kg/hr (Order-Specific) Intravenous Titrated Alvaro MD Dany 18 Units/kg/hr (03/14/22 5266)    heparin (porcine)  4,400 Units Intravenous Q1H PRN Jovanni Harmon MD      heparin (porcine)  8,800 Units Intravenous Q1H PRN Jovanni Harmon MD      hydrALAZINE  10 mg Intravenous Q8H PRN Jovanni Harmon MD      hydrALAZINE  50 mg Oral [facility] Jovanni Harmon MD      HYDROmorphone  0 2 mg Intravenous Q1H PRN Mirtha Reeves PA-C      insulin lispro  1-5 Units Subcutaneous HS Mirtha Reeves, PA-ALTAGRACIA      insulin lispro  2-12 Units Subcutaneous 4 times day Mirtha Reeves PA-C      methocarbamol  500 mg Oral Q6H [address] VEENA Brown-ALTAGRACIA      metoprolol succinate  100 mg Oral Daily Mirtha Reeves PA-C      ondansetron  4 mg Intravenous Q6H PRN VEENA Brown-ALTAGRACIA      oxyCODONE  2 5 mg Oral Q4H PRN VEENA Brown-ALTAGRACIA      oxyCODONE  5 mg Oral Q4H PRN VEENA Brown-ALTAGRACIA      pantoprazole  40 mg Oral Early Morning VEENA Brown-ALTAGRACIA      senna  1 tablet Oral Daily VEENA Brown-ALTAGRACIA      warfarin  3 mg Oral Once (warfarin) Jovanni Harmon MD          Today, Patient Was Seen By: Jovanni Harmon MD    **Please Note: This note may have been constructed using a voice recognition system  **

## 2022-03-14 NOTE — ASSESSMENT & PLAN NOTE
POD3 Anterior cervical discectomy and fixation fusion C5/6 and C6/7; Posterior cervical decompression and instrumented fusion C3-T1  · Pre op- severe canal stenosis C5-6 with small area of signal abnormality   Presented with significant decline in ambulation along with numbness and weakness R>L over the last week  Reports multiple falls  Imaging:    Cervical spine x-ray 03/11/2022:ACDF C5-C7 and posterior fusion hardware from C3 through T1 as above  Plan:   Continue monitor neurological exam and symptoms   [place] collar to remain in place at all times, okay for [place] collar for showering   Continue Decadron 48hr taper as ordered   Given 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, prior anticoagulation (Coumadin) is currently on hold     o Ensure neurological stable on therapeutic heparin and then will transition to coumadin   Medical management and pain control per primary team   Anterior and posterior drains removed 3/13  · Mobilize with PT/OT, recommending acute rehab  · Encouraged incentive spirometer  · DVT PPX: SCDs and heparin gtt  · Monitor closely for any swallowing, speech or breathing difficulties  Monitor for any new neuro complaints  Neurosurgery will continue to follow, call with any further questions or concerns

## 2022-03-14 NOTE — PLAN OF CARE
Problem: MOBILITY - ADULT  Goal: Maintain or return to baseline ADL function  Description: INTERVENTIONS:  -  Assess patient's ability to carry out ADLs; assess patient's baseline for ADL function and identify physical deficits which impact ability to perform ADLs (bathing, care of mouth/teeth, toileting, grooming, dressing, etc )  - Assess/evaluate cause of self-care deficits   - Assess range of motion  - Assess patient's mobility; develop plan if impaired  - Assess patient's need for assistive devices and provide as appropriate  - Encourage maximum independence but intervene and supervise when necessary  - Involve family in performance of ADLs  - Assess for home care needs following discharge   - Consider OT consult to assist with ADL evaluation and planning for discharge  - Provide patient education as appropriate  Outcome: Progressing  Goal: Maintains/Returns to pre admission functional level  Description: INTERVENTIONS:  - Perform BMAT or MOVE assessment daily    - Set and communicate daily mobility goal to care team and patient/family/caregiver     - Collaborate with rehabilitation services on mobility goals if consulted  - Dangle patient 3 times a day  - Stand patient 3 times a day  - Ambulate patient 3 times a day  - Out of bed to chair 3 times a day   - Out of bed for meals 3 times a day  - Out of bed for toileting  - Record patient progress and toleration of activity level   Outcome: Progressing     Problem: PAIN - ADULT  Goal: Verbalizes/displays adequate comfort level or baseline comfort level  Description: Interventions:  - Encourage patient to monitor pain and request assistance  - Assess pain using appropriate pain scale  - Administer analgesics based on type and severity of pain and evaluate response  - Implement non-pharmacological measures as appropriate and evaluate response  - Notify physician/advanced practitioner if interventions unsuccessful or patient reports new pain  Outcome: Progressing Problem: INFECTION - ADULT  Goal: Absence or prevention of progression during hospitalization  Description: INTERVENTIONS:  - Assess and monitor for signs and symptoms of infection  - Monitor lab/diagnostic results  - Monitor all insertion sites, i e  indwelling lines, tubes, and drains  - [place] appropriate cooling/warming therapies per order  - Administer medications as ordered  - Instruct and encourage patient and family to use good hand hygiene technique  - Identify and instruct in appropriate isolation precautions for identified infection/condition  Outcome: Progressing     Problem: SAFETY ADULT  Goal: Maintain or return to baseline ADL function  Description: INTERVENTIONS:  -  Assess patient's ability to carry out ADLs; assess patient's baseline for ADL function and identify physical deficits which impact ability to perform ADLs (bathing, care of mouth/teeth, toileting, grooming, dressing, etc )  - Assess/evaluate cause of self-care deficits   - Assess range of motion  - Assess patient's mobility; develop plan if impaired  - Assess patient's need for assistive devices and provide as appropriate  - Encourage maximum independence but intervene and supervise when necessary  - Involve family in performance of ADLs  - Assess for home care needs following discharge   - Consider OT consult to assist with ADL evaluation and planning for discharge  - Provide patient education as appropriate  Outcome: Progressing  Goal: Maintains/Returns to pre admission functional level  Description: INTERVENTIONS:  - Perform BMAT or MOVE assessment daily    - Set and communicate daily mobility goal to care team and patient/family/caregiver     - Collaborate with rehabilitation services on mobility goals if consulted  - Dangle patient 3 times a day  - Stand patient 3 times a day  - Ambulate patient 3 times a day  - Out of bed to chair 3 times a day   - Out of bed for meals 3 times a day  - Out of bed for toileting  - Record patient progress and toleration of activity level   Outcome: Progressing  Goal: Patient will remain free of falls  Description: INTERVENTIONS:  - Educate patient/family on patient safety including physical limitations  - Instruct patient to call for assistance with activity   - Consult OT/PT to assist with strengthening/mobility   - Keep Call bell within reach  - Keep bed low and locked with side rails adjusted as appropriate  - Keep care items and personal belongings within reach  - Initiate and maintain comfort rounds  - Make Fall Risk Sign visible to staff  - Offer Toileting every 2 Hours, in advance of need  - Initiate/Maintain bed alarm  - Obtain necessary fall risk management equipment  - Apply yellow socks and bracelet for high fall risk patients  - Consider moving patient to room near nurses station  Outcome: Progressing     Problem: DISCHARGE PLANNING  Goal: Discharge to home or other facility with appropriate resources  Description: INTERVENTIONS:  - Identify barriers to discharge w/patient and caregiver  - Arrange for needed discharge resources and transportation as appropriate  - Identify discharge learning needs (meds, wound care, etc )  - Refer to Case Management Department for coordinating discharge planning if the patient needs post-hospital services based on physician/advanced practitioner order or complex needs related to functional status, cognitive ability, or social support system  Outcome: Progressing     Problem: Knowledge Deficit  Goal: Patient/family/caregiver demonstrates understanding of disease process, treatment plan, medications, and discharge instructions  Description: Complete learning assessment and assess knowledge base    Interventions:  - Provide teaching at level of understanding  - Provide teaching via preferred learning methods  Outcome: Progressing     Problem: Neurological Deficit  Goal: Neurological status is stable or improving  Description: Interventions:  - Monitor and assess patient's level of consciousness, motor function, sensory function, and level of assistance needed for ADLs  - Monitor and report changes from baseline  Collaborate with interdisciplinary team to initiate plan and implement interventions as ordered  - Provide and maintain a safe environment  - Consider seizure precautions  - Consider fall precautions  - Consider aspiration precautions  - Consider bleeding precautions  Outcome: Progressing     Problem: Activity Intolerance/Impaired Mobility  Goal: Mobility/activity is maintained at optimum level for patient  Description: Interventions:  - Assess and monitor patient  barriers to mobility and need for assistive/adaptive devices  - Assess patient's emotional response to limitations  - Collaborate with interdisciplinary team and initiate plans and interventions as ordered  - Encourage independent activity per ability   - Maintain proper body alignment  - Perform active/passive rom as tolerated/ordered  - Plan activities to conserve energy   - Turn patient as appropriate  Outcome: Progressing     Problem: Communication Impairment  Goal: Ability to express needs and understand communication  Description: Assess patient's communication skills and ability to understand information  Patient will demonstrate use of effective communication techniques, alternative methods of communication and understanding even if not able to speak  - Encourage communication and provide alternate methods of communication as needed  - Collaborate with case management/ for discharge needs  - Include patient/family/caregiver in decisions related to communication  Outcome: Progressing     Problem: Potential for Aspiration  Goal: Non-ventilated patient's risk of aspiration is minimized  Description: Assess and monitor vital signs, respiratory status, and labs (WBC)  Monitor for signs of aspiration (tachypnea, cough, rales, wheezing, cyanosis, fever)      - Assess and monitor patient's ability to swallow  - Place patient up in chair to eat if possible  - HOB up at 90 degrees to eat if unable to get patient up into chair   - Supervise patient during oral intake  - Instruct patient/ family to take small bites  - Instruct patient/ family to take small single sips when taking liquids  - Follow patient-specific strategies generated by speech pathologist   Outcome: Progressing     Problem: Nutrition  Goal: Nutrition/Hydration status is improving  Description: Monitor and assess patient's nutrition/hydration status for malnutrition (ex- brittle hair, bruises, dry skin, pale skin and conjunctiva, muscle wasting, smooth red tongue, and disorientation)  Collaborate with interdisciplinary team and initiate plan and interventions as ordered  Monitor patient's weight and dietary intake as ordered or per policy  Utilize nutrition screening tool and intervene per policy  Determine patient's food preferences and provide high-protein, high-caloric foods as appropriate  - Assist patient with eating   - Allow adequate time for meals   - Encourage patient to take dietary supplement as ordered  - Collaborate with clinical nutritionist   - Include patient/family/caregiver in decisions related to nutrition  Outcome: Progressing     Problem: Nutrition/Hydration-ADULT  Goal: Nutrient/Hydration intake appropriate for improving, restoring or maintaining nutritional needs  Description: Monitor and assess patient's nutrition/hydration status for malnutrition  Collaborate with interdisciplinary team and initiate plan and interventions as ordered  Monitor patient's weight and dietary intake as ordered or per policy  Utilize nutrition screening tool and intervene as necessary  Determine patient's food preferences and provide high-protein, high-caloric foods as appropriate       INTERVENTIONS:  - Monitor oral intake, urinary output, labs, and treatment plans  - Assess nutrition and hydration status and recommend course of action  - Evaluate amount of meals eaten  - Assist patient with eating if necessary   - Allow adequate time for meals  - Recommend/ encourage appropriate diets, oral nutritional supplements, and vitamin/mineral supplements  - Order, calculate, and assess calorie counts as needed  - Assess need for intravenous fluids  - Provide specific nutrition/hydration education as appropriate  - Include patient/family/caregiver in decisions related to nutrition  Outcome: Progressing     Problem: Potential for Falls  Goal: Patient will remain free of falls  Description: INTERVENTIONS:  - Educate patient/family on patient safety including physical limitations  - Instruct patient to call for assistance with activity   - Consult OT/PT to assist with strengthening/mobility   - Keep Call bell within reach  - Keep bed low and locked with side rails adjusted as appropriate  - Keep care items and personal belongings within reach  - Initiate and maintain comfort rounds  - Make Fall Risk Sign visible to staff  - Offer Toileting every 2 Hours, in advance of need  - Initiate/Maintain bed alarm  - Obtain necessary fall risk management equipment    - Apply yellow socks and bracelet for high fall risk patients  - Consider moving patient to room near nurses station  Outcome: Progressing     Problem: Prexisting or High Potential for Compromised Skin Integrity  Goal: Skin integrity is maintained or improved  Description: INTERVENTIONS:  - Identify patients at risk for skin breakdown  - Assess and monitor skin integrity  - Assess and monitor nutrition and hydration status  - Monitor labs   - Assess for incontinence   - Turn and reposition patient  - Assist with mobility/ambulation  - Relieve pressure over bony prominences  - Avoid friction and shearing  - Provide appropriate hygiene as needed including keeping skin clean and dry  - Evaluate need for skin moisturizer/barrier cream  - Collaborate with interdisciplinary team   - Patient/family teaching  - Consider wound care consult   Outcome: Progressing

## 2022-03-14 NOTE — RESTORATIVE TECHNICIAN NOTE
Restorative Technician Note      Patient Name: Debbie Florentino     Note Type: Mobility  Patient Position Upon Consult: Supine  Activity Performed: Ambulated; VAOMWIO; Stood  Assistive Device: Roller walker; Other (Comment) ([address])  Education Provided: Yes  Patient Position at End of Consult: Supine;  All needs within reach; Bed/Chair alarm activated    [facility] ERICK VILLANUEVA, Restorative Technician, [employer]

## 2022-03-14 NOTE — CASE MANAGEMENT
Case Management Progress Note    Patient name Angeles Salas  Location [address]/[facility]HP 577-23 MRN [id number]  : 1950 Date 3/14/2022       LOS (days): 9  Geometric Mean LOS (GMLOS) (days): 2 90  Days to GMLOS:-5 9        OBJECTIVE:        Current admission status: Inpatient  Preferred Pharmacy:   [address]  [address], [address]  [address] [place], [address] [address]  Phone: [phone] Fax: [phone]    Primary Care Provider: Lon Jimenez MD    Primary Insurance: [address] [place] [address]  Secondary Insurance:     PROGRESS NOTE:    CM is aware the pt transferred from [facility] to  on 3/11  LOS upon service transfer was five days, and LOS is eight days at the writing of this note  Previous CM documentation was reviewed and is appreciated  Per chart review, the pt continues to receive acute care intervention  PT/OT evaluated the pt on 3/12, and recommend PAR v  D/C home pending progress  If PAR is the final plan, insurance authorization from [place] will be required  CM will introduce self to pt/family, explain supportive discharge planning role, and continue to follow for disposition planning       Janel Bedoya, MSW, LCSW, CCM, ACM-SW, [facility]

## 2022-03-14 NOTE — PROGRESS NOTES
[address] [facility]  Progress Note - Harry Fan 1950, 71 y o  male MRN: [id number]  Unit/Bed#: [facility] 521-54 Encounter: [id number]  Primary Care Provider: Edmund Bonilla MD   Date and time admitted to hospital: 3/5/2022  8:18 PM    * Cervical myelopathy [facility])  Assessment & Plan  POD3 Anterior cervical discectomy and fixation fusion C5/6 and C6/7; Posterior cervical decompression and instrumented fusion C3-T1  · Pre op- severe canal stenosis C5-6 with small area of signal abnormality   Presented with significant decline in ambulation along with numbness and weakness R>L over the last week  Reports multiple falls  Imaging:    Cervical spine x-ray 03/11/2022:ACDF C5-C7 and posterior fusion hardware from C3 through T1 as above  Plan:   Continue monitor neurological exam and symptoms   [place] collar to remain in place at all times, okay for [place] collar for showering   Continue Decadron 48hr taper as ordered   Given 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, prior anticoagulation (Coumadin) is currently on hold     o Ensure neurological stable on therapeutic heparin and then will transition to coumadin   Medical management and pain control per primary team   Anterior and posterior drains removed 3/13  · Mobilize with PT/OT, recommending acute rehab  · Encouraged incentive spirometer  · DVT PPX: SCDs and heparin gtt  · Monitor closely for any swallowing, speech or breathing difficulties  Monitor for any new neuro complaints  Neurosurgery will continue to follow, call with any further questions or concerns  Subjective/Objective   Chief Complaint: "I feel good"    Subjective: Patient states he is 95% improved from pre-operative state  He is tolerating a regular diet  Admits to mild discomfort in left posterior neck where prior drain was present  Tolerating collar  Mobilizing with PT  Voiding well  + BM  No CP/SOB/N/V      Objective: sitting up in bed  NAD  I/O       03/13 0701  03/14 0700 03/14 0701  03/15 0700    P  O  340 480    Total Intake(mL/kg) 340 (3 1) 480 (4 3)    Urine (mL/kg/hr) 1300 (0 5)     Drains      Total Output 1300     Net -960 +480                Invasive Devices  Report    Peripheral Intravenous Line            Peripheral IV 03/13/22 Right Arm 1 day                Physical Exam:  Vitals: Blood pressure 147/76, pulse 58, temperature 97 7 °F (36 5 °C), resp  rate 18, weight 111 kg (244 lb 0 8 oz), SpO2 99 %  ,Body mass index is 34 04 kg/m²  General appearance: alert, appears stated age, cooperative and no distress  Head: Normocephalic, without obvious abnormality, atraumatic  Eyes: conjugate gaze and tracks appropriately in room  Neck: posterior incision CDI with staples  Anterior incision CDI with steri-strips  Collar in place  Lungs: non labored breathing  Heart: regular heart rate  Neurologic:   Mental status: Alert, oriented, thought content appropriate  Cranial nerves: grossly intact (Cranial nerves II-XII)  Sensory: normal to LT, preop numbness resolved  Motor: moving all extremities with minimal right  weakness 4+  Reflexes: right clonus    Lab Results:  Results from last 7 days   Lab Units 03/14/22  0821 03/13/22  0843 03/12/22  0638 03/10/22  0516 03/08/22  0535   WBC Thousand/uL 10 59* 13 33* 13 85*   < > 9 58   HEMOGLOBIN g/dL 13 6 14 0 13 2   < > 13 1   HEMATOCRIT % 41 0 41 1 37 4   < > 36 9   PLATELETS Thousands/uL 160 176 182   < > 215   NEUTROS PCT %  --   --   --   --  87*   MONOS PCT %  --   --   --   --  2*    < > = values in this interval not displayed       Results from last 7 days   Lab Units 03/14/22  0821 03/13/22  0843 03/12/22  0639 03/10/22  0232 03/08/22  0535   POTASSIUM mmol/L 3 7 4 1 3 8   < > 3 8   CHLORIDE mmol/L 98* 101 102   < > 107   CO2 mmol/L 30 30 29   < > 26   BUN mg/dL 17 16 18   < > 13   CREATININE mg/dL 0 81 0 76 0 96   < > 0 82   CALCIUM mg/dL 9 1 9 0 9 3   < > 9 4   ALK PHOS U/L  -- --   --   --  57   ALT U/L  --   --   --   --  18   AST U/L  --   --   --   --  16    < > = values in this interval not displayed  Results from last 7 days   Lab Units 03/10/22  0232   MAGNESIUM mg/dL 2 0         Results from last 7 days   Lab Units 03/14/22  1734 03/14/22  0821 03/12/22  0639 03/11/22  0611 03/10/22  2013   INR   --  1 06 1 14 1 04  --    PTT seconds 93* 25 21*  --    < >    < > = values in this interval not displayed  No results found for: TROPONINT  ABG:No results found for: PHART, PVR8SMO, PO2ART, QIZ6WKM, J3EWSFAO, BEART, SOURCE    Imaging Studies: I have personally reviewed pertinent reports  and I have personally reviewed pertinent films in PACS    MRI brain wo contrast    Result Date: 3/5/2022  Impression: No acute intracranial pathology  Mild chronic microangiopathy  Workstation performed: [id number]     MRI cervical spine w wo contrast    Result Date: 3/5/2022  Impression: No acute abnormality  Motion limited    Multilevel degenerative spondylosis progressed from 2013 most severe at C5-6 with severe canal stenosis and flattening of the cord  Small focus of T2 signal within the cord at C5-C6 may represent myelomalacia  Moderate canal stenosis at C3-4 mildly indents the cord without signal abnormality  Marked multilevel foraminal stenosis  No prevertebral edema or mass   Workstation performed: [id number]       EKG, Pathology, and Other Studies: none     VTE Pharmacologic Prophylaxis: Heparin    VTE Mechanical Prophylaxis: sequential compression device

## 2022-03-14 NOTE — PLAN OF CARE
Problem: MOBILITY - ADULT  Goal: Maintain or return to baseline ADL function  Description: INTERVENTIONS:  -  Assess patient's ability to carry out ADLs; assess patient's baseline for ADL function and identify physical deficits which impact ability to perform ADLs (bathing, care of mouth/teeth, toileting, grooming, dressing, etc )  - Assess/evaluate cause of self-care deficits   - Assess range of motion  - Assess patient's mobility; develop plan if impaired  - Assess patient's need for assistive devices and provide as appropriate  - Encourage maximum independence but intervene and supervise when necessary  - Involve family in performance of ADLs  - Assess for home care needs following discharge   - Consider OT consult to assist with ADL evaluation and planning for discharge  - Provide patient education as appropriate  Outcome: Progressing  Goal: Maintains/Returns to pre admission functional level  Description: INTERVENTIONS:  - Perform BMAT or MOVE assessment daily    - Set and communicate daily mobility goal to care team and patient/family/caregiver     - Collaborate with rehabilitation services on mobility goals if consulted  - Dangle patient 3 times a day  - Stand patient 3 times a day  - Ambulate patient 3 times a day  - Out of bed to chair 3 times a day   - Out of bed for meals 3 times a day  - Out of bed for toileting  - Record patient progress and toleration of activity level   Outcome: Progressing     Problem: PAIN - ADULT  Goal: Verbalizes/displays adequate comfort level or baseline comfort level  Description: Interventions:  - Encourage patient to monitor pain and request assistance  - Assess pain using appropriate pain scale  - Administer analgesics based on type and severity of pain and evaluate response  - Implement non-pharmacological measures as appropriate and evaluate response  - Notify physician/advanced practitioner if interventions unsuccessful or patient reports new pain  Outcome: Progressing Problem: INFECTION - ADULT  Goal: Absence or prevention of progression during hospitalization  Description: INTERVENTIONS:  - Assess and monitor for signs and symptoms of infection  - Monitor lab/diagnostic results  - Monitor all insertion sites, i e  indwelling lines, tubes, and drains  - [place] appropriate cooling/warming therapies per order  - Administer medications as ordered  - Instruct and encourage patient and family to use good hand hygiene technique  - Identify and instruct in appropriate isolation precautions for identified infection/condition  Outcome: Progressing     Problem: SAFETY ADULT  Goal: Maintain or return to baseline ADL function  Description: INTERVENTIONS:  -  Assess patient's ability to carry out ADLs; assess patient's baseline for ADL function and identify physical deficits which impact ability to perform ADLs (bathing, care of mouth/teeth, toileting, grooming, dressing, etc )  - Assess/evaluate cause of self-care deficits   - Assess range of motion  - Assess patient's mobility; develop plan if impaired  - Assess patient's need for assistive devices and provide as appropriate  - Encourage maximum independence but intervene and supervise when necessary  - Involve family in performance of ADLs  - Assess for home care needs following discharge   - Consider OT consult to assist with ADL evaluation and planning for discharge  - Provide patient education as appropriate  Outcome: Progressing  Goal: Maintains/Returns to pre admission functional level  Description: INTERVENTIONS:  - Perform BMAT or MOVE assessment daily    - Set and communicate daily mobility goal to care team and patient/family/caregiver     - Collaborate with rehabilitation services on mobility goals if consulted  - Dangle patient 3 times a day  - Stand patient 3 times a day  - Ambulate patient 3 times a day  - Out of bed to chair 3 times a day   - Out of bed for meals 3 times a day  - Out of bed for toileting  - Record patient progress and toleration of activity level   Outcome: Progressing  Goal: Patient will remain free of falls  Description: INTERVENTIONS:  - Educate patient/family on patient safety including physical limitations  - Instruct patient to call for assistance with activity   - Consult OT/PT to assist with strengthening/mobility   - Keep Call bell within reach  - Keep bed low and locked with side rails adjusted as appropriate  - Keep care items and personal belongings within reach  - Initiate and maintain comfort rounds  - Make Fall Risk Sign visible to staff  - Offer Toileting every Hours, in advance of need  - Initiate/Maintain alarm  - Obtain necessary fall risk management equipment:   - Apply yellow socks and bracelet for high fall risk patients  - Consider moving patient to room near nurses station  Outcome: Progressing     Problem: DISCHARGE PLANNING  Goal: Discharge to home or other facility with appropriate resources  Description: INTERVENTIONS:  - Identify barriers to discharge w/patient and caregiver  - Arrange for needed discharge resources and transportation as appropriate  - Identify discharge learning needs (meds, wound care, etc )  - Refer to Case Management Department for coordinating discharge planning if the patient needs post-hospital services based on physician/advanced practitioner order or complex needs related to functional status, cognitive ability, or social support system  Outcome: Progressing     Problem: Knowledge Deficit  Goal: Patient/family/caregiver demonstrates understanding of disease process, treatment plan, medications, and discharge instructions  Description: Complete learning assessment and assess knowledge base    Interventions:  - Provide teaching at level of understanding  - Provide teaching via preferred learning methods  Outcome: Progressing     Problem: Neurological Deficit  Goal: Neurological status is stable or improving  Description: Interventions:  - Monitor and assess patient's level of consciousness, motor function, sensory function, and level of assistance needed for ADLs  - Monitor and report changes from baseline  Collaborate with interdisciplinary team to initiate plan and implement interventions as ordered  - Provide and maintain a safe environment  - Consider seizure precautions  - Consider fall precautions  - Consider aspiration precautions  - Consider bleeding precautions  Outcome: Progressing     Problem: Activity Intolerance/Impaired Mobility  Goal: Mobility/activity is maintained at optimum level for patient  Description: Interventions:  - Assess and monitor patient  barriers to mobility and need for assistive/adaptive devices  - Assess patient's emotional response to limitations  - Collaborate with interdisciplinary team and initiate plans and interventions as ordered  - Encourage independent activity per ability   - Maintain proper body alignment  - Perform active/passive rom as tolerated/ordered  - Plan activities to conserve energy   - Turn patient as appropriate  Outcome: Progressing     Problem: Communication Impairment  Goal: Ability to express needs and understand communication  Description: Assess patient's communication skills and ability to understand information  Patient will demonstrate use of effective communication techniques, alternative methods of communication and understanding even if not able to speak  - Encourage communication and provide alternate methods of communication as needed  - Collaborate with case management/ for discharge needs  - Include patient/family/caregiver in decisions related to communication  Outcome: Progressing     Problem: Potential for Aspiration  Goal: Non-ventilated patient's risk of aspiration is minimized  Description: Assess and monitor vital signs, respiratory status, and labs (WBC)  Monitor for signs of aspiration (tachypnea, cough, rales, wheezing, cyanosis, fever)      - Assess and monitor patient's ability to swallow  - Place patient up in chair to eat if possible  - HOB up at 90 degrees to eat if unable to get patient up into chair   - Supervise patient during oral intake  - Instruct patient/ family to take small bites  - Instruct patient/ family to take small single sips when taking liquids  - Follow patient-specific strategies generated by speech pathologist   Outcome: Progressing     Problem: Nutrition  Goal: Nutrition/Hydration status is improving  Description: Monitor and assess patient's nutrition/hydration status for malnutrition (ex- brittle hair, bruises, dry skin, pale skin and conjunctiva, muscle wasting, smooth red tongue, and disorientation)  Collaborate with interdisciplinary team and initiate plan and interventions as ordered  Monitor patient's weight and dietary intake as ordered or per policy  Utilize nutrition screening tool and intervene per policy  Determine patient's food preferences and provide high-protein, high-caloric foods as appropriate  - Assist patient with eating   - Allow adequate time for meals   - Encourage patient to take dietary supplement as ordered  - Collaborate with clinical nutritionist   - Include patient/family/caregiver in decisions related to nutrition  Outcome: Progressing     Problem: Nutrition/Hydration-ADULT  Goal: Nutrient/Hydration intake appropriate for improving, restoring or maintaining nutritional needs  Description: Monitor and assess patient's nutrition/hydration status for malnutrition  Collaborate with interdisciplinary team and initiate plan and interventions as ordered  Monitor patient's weight and dietary intake as ordered or per policy  Utilize nutrition screening tool and intervene as necessary  Determine patient's food preferences and provide high-protein, high-caloric foods as appropriate       INTERVENTIONS:  - Monitor oral intake, urinary output, labs, and treatment plans  - Assess nutrition and hydration status and recommend course of action  - Evaluate amount of meals eaten  - Assist patient with eating if necessary   - Allow adequate time for meals  - Recommend/ encourage appropriate diets, oral nutritional supplements, and vitamin/mineral supplements  - Order, calculate, and assess calorie counts as needed  - Assess need for intravenous fluids  - Provide specific nutrition/hydration education as appropriate  - Include patient/family/caregiver in decisions related to nutrition  Outcome: Progressing     Problem: Potential for Falls  Goal: Patient will remain free of falls  Description: INTERVENTIONS:  - Educate patient/family on patient safety including physical limitations  - Instruct patient to call for assistance with activity   - Consult OT/PT to assist with strengthening/mobility   - Keep Call bell within reach  - Keep bed low and locked with side rails adjusted as appropriate  - Keep care items and personal belongings within reach  - Initiate and maintain comfort rounds  - Make Fall Risk Sign visible to staff  - Offer Toileting every  Hours, in advance of need  - Initiate/Maintain alarm  - Obtain necessary fall risk management equipment  - Apply yellow socks and bracelet for high fall risk patients  - Consider moving patient to room near nurses station  Outcome: Progressing     Problem: Prexisting or High Potential for Compromised Skin Integrity  Goal: Skin integrity is maintained or improved  Description: INTERVENTIONS:  - Identify patients at risk for skin breakdown  - Assess and monitor skin integrity  - Assess and monitor nutrition and hydration status  - Monitor labs   - Assess for incontinence   - Turn and reposition patient  - Assist with mobility/ambulation  - Relieve pressure over bony prominences  - Avoid friction and shearing  - Provide appropriate hygiene as needed including keeping skin clean and dry  - Evaluate need for skin moisturizer/barrier cream  - Collaborate with interdisciplinary team - Patient/family teaching  - Consider wound care consult   Outcome: Progressing

## 2022-03-15 LAB
APTT PPP: 136 SECONDS (ref 23–37)
APTT PPP: 72 SECONDS (ref 23–37)
APTT PPP: 85 SECONDS (ref 23–37)
GLUCOSE SERPL-MCNC: 129 MG/DL (ref 65–140)
GLUCOSE SERPL-MCNC: 137 MG/DL (ref 65–140)
GLUCOSE SERPL-MCNC: 139 MG/DL (ref 65–140)
GLUCOSE SERPL-MCNC: 154 MG/DL (ref 65–140)
GLUCOSE SERPL-MCNC: 171 MG/DL (ref 65–140)
GLUCOSE SERPL-MCNC: 94 MG/DL (ref 65–140)
INR PPP: 1.09 (ref 0.84–1.19)
PROTHROMBIN TIME: 13.7 SECONDS (ref 11.6–14.5)

## 2022-03-15 PROCEDURE — 99024 POSTOP FOLLOW-UP VISIT: CPT | Performed by: NEUROLOGICAL SURGERY

## 2022-03-15 PROCEDURE — 97530 THERAPEUTIC ACTIVITIES: CPT

## 2022-03-15 PROCEDURE — 85730 THROMBOPLASTIN TIME PARTIAL: CPT | Performed by: INTERNAL MEDICINE

## 2022-03-15 PROCEDURE — 85610 PROTHROMBIN TIME: CPT | Performed by: INTERNAL MEDICINE

## 2022-03-15 PROCEDURE — 82948 REAGENT STRIP/BLOOD GLUCOSE: CPT

## 2022-03-15 PROCEDURE — 97535 SELF CARE MNGMENT TRAINING: CPT

## 2022-03-15 PROCEDURE — 97116 GAIT TRAINING THERAPY: CPT

## 2022-03-15 PROCEDURE — 99232 SBSQ HOSP IP/OBS MODERATE 35: CPT | Performed by: INTERNAL MEDICINE

## 2022-03-15 RX ORDER — WARFARIN SODIUM 3 MG/1
3 TABLET ORAL
Status: COMPLETED | OUTPATIENT
Start: 2022-03-15 | End: 2022-03-15

## 2022-03-15 RX ADMIN — ATORVASTATIN CALCIUM 40 MG: 40 TABLET, FILM COATED ORAL at 17:32

## 2022-03-15 RX ADMIN — METHOCARBAMOL 500 MG: 500 TABLET, FILM COATED ORAL at 17:32

## 2022-03-15 RX ADMIN — FLECAINIDE ACETATE 100 MG: 100 TABLET ORAL at 17:32

## 2022-03-15 RX ADMIN — PANTOPRAZOLE SODIUM 40 MG: 40 TABLET, DELAYED RELEASE ORAL at 05:46

## 2022-03-15 RX ADMIN — INSULIN LISPRO 2 UNITS: 100 INJECTION, SOLUTION INTRAVENOUS; SUBCUTANEOUS at 18:04

## 2022-03-15 RX ADMIN — ACETAMINOPHEN 325MG 975 MG: 325 TABLET ORAL at 14:37

## 2022-03-15 RX ADMIN — INSULIN LISPRO 2 UNITS: 100 INJECTION, SOLUTION INTRAVENOUS; SUBCUTANEOUS at 10:37

## 2022-03-15 RX ADMIN — WARFARIN SODIUM 3 MG: 3 TABLET ORAL at 17:32

## 2022-03-15 RX ADMIN — HYDRALAZINE HYDROCHLORIDE 50 MG: 50 TABLET ORAL at 05:45

## 2022-03-15 RX ADMIN — METHOCARBAMOL 500 MG: 500 TABLET, FILM COATED ORAL at 05:45

## 2022-03-15 RX ADMIN — HEPARIN SODIUM 13 UNITS/KG/HR: 10000 INJECTION, SOLUTION INTRAVENOUS; SUBCUTANEOUS at 17:31

## 2022-03-15 RX ADMIN — DOCUSATE SODIUM 100 MG: 100 CAPSULE ORAL at 17:32

## 2022-03-15 RX ADMIN — AMLODIPINE BESYLATE 5 MG: 5 TABLET ORAL at 10:36

## 2022-03-15 RX ADMIN — DOCUSATE SODIUM 100 MG: 100 CAPSULE ORAL at 10:36

## 2022-03-15 RX ADMIN — DEXAMETHASONE 2 MG: 2 TABLET ORAL at 05:45

## 2022-03-15 RX ADMIN — ACETAMINOPHEN 325MG 975 MG: 325 TABLET ORAL at 05:46

## 2022-03-15 RX ADMIN — METOPROLOL SUCCINATE 100 MG: 100 TABLET, EXTENDED RELEASE ORAL at 10:36

## 2022-03-15 RX ADMIN — STANDARDIZED SENNA CONCENTRATE 8.6 MG: 8.6 TABLET ORAL at 10:36

## 2022-03-15 RX ADMIN — DULOXETINE HYDROCHLORIDE 60 MG: 60 CAPSULE, DELAYED RELEASE ORAL at 21:50

## 2022-03-15 RX ADMIN — HYDRALAZINE HYDROCHLORIDE 50 MG: 50 TABLET ORAL at 14:37

## 2022-03-15 RX ADMIN — METHOCARBAMOL 500 MG: 500 TABLET, FILM COATED ORAL at 11:24

## 2022-03-15 RX ADMIN — FLECAINIDE ACETATE 100 MG: 100 TABLET ORAL at 10:37

## 2022-03-15 RX ADMIN — HYDRALAZINE HYDROCHLORIDE 50 MG: 50 TABLET ORAL at 21:50

## 2022-03-15 RX ADMIN — AMLODIPINE BESYLATE 5 MG: 5 TABLET ORAL at 21:50

## 2022-03-15 RX ADMIN — DEXAMETHASONE 2 MG: 2 TABLET ORAL at 21:50

## 2022-03-15 RX ADMIN — DEXAMETHASONE 2 MG: 2 TABLET ORAL at 14:37

## 2022-03-15 RX ADMIN — ACETAMINOPHEN 325MG 975 MG: 325 TABLET ORAL at 21:49

## 2022-03-15 NOTE — PROGRESS NOTES
[facility]  Progress Note - Antonio Chavez 1950, 71 y o  male MRN: [id number]  Unit/Bed#: [facility] 713-01 Encounter: [id number]  Primary Care Provider: Boby Mac MD   Date and time admitted to hospital: 3/5/2022  8:18 PM    * Cervical myelopathy [facility])  Assessment & Plan  · S/p Anterior cervical discectomy and fixation fusion C5/6 and C6/7; Posterior cervical decompression and instrumented fusion C3-T1, 3/11/2022  Plan  · Neurosurgery following  · On Decadron taper  · PT/OT    Paroxysmal A-fib ([facility], [place] [address] )  Assessment & Plan  · On Coumadin at home  · Continue heart rate control medications  · POD 4  · Daily INR while bridging to warfarin with heparin gtt (goal 2-3)--> started 3/14     Muscle spasm  Assessment & Plan  · Complaining of muscle spasm of left shoulder due to the collar  Plan  · Trial of flexiril    Leukocytosis  Assessment & Plan  · Afebrile and hemodynamically stable  · Likely due to steroid use  · Continue to monitor    Goals of care, counseling/discussion  Assessment & Plan  Discussed code status with patient and his spouse who was present on the phone during the time of the conversation  Patient continues to stay that he wishes to be DNR DNI  He understands that the DNI order will need to be rescinded prior to procedure if he requires intubation and reinstated after the procedure  He agrees and also does his wife  Sinus bradycardia  Assessment & Plan  · Sinus bradycardia on EKG  · Patient asymptomatic  · Cardiology following    Ambulatory dysfunction  Assessment & Plan  · Management as above  · PT OT      Mixed hyperlipidemia  Assessment & Plan  · Continue statin    Factor V Leiden mutation ([facility] [place] [address] )  Assessment & Plan  · On Coumadin outpatient  · As above    WILL (obstructive sleep apnea)  Assessment & Plan  · Continue CPAP at night      Essential hypertension  Assessment & Plan  · Uncontrolled, likely due to steroid use; improved with addition of hydralazine and increased amlodipine  · Continue home blood pressure meds   · Cardiology following        VTE Pharmacologic Prophylaxis:   Pharmacologic: Heparin Drip with warfarin  Mechanical VTE Prophylaxis in Place: Yes    Patient Centered Rounds: I have performed bedside rounds with nursing staff today  Discussions with Specialists or Other Care Team Provider: Neurosurgery    Education and Discussions with Family / Patient: Will attempt to call wife Carla Kahn    Time Spent for Care: 45 minutes  More than 50% of total time spent on counseling and coordination of care as described above  Current Length of Stay: 10 day(s)    Current Patient Status: Inpatient   Certification Statement: The patient will continue to require additional inpatient hospital stay due to Neurosurgery clearance, Heparin to warfarin bridge, placement    Discharge Plan: Per neurosurgery, PARS    Code Status: Level 3 - DNAR and DNI      Subjective:   Patient seen and examined in his hospital chair, he feels well overall  Much stronger compared to before he came in to do the surgery  Denies any neurological deficits in his extremities  Also denies any shortness of breath or chest pain  Good appetite, normal bowel and bladder movements  Objective:     Vitals:   Temp (24hrs), Av 7 °F (36 5 °C), Min:97 7 °F (36 5 °C), Max:97 7 °F (36 5 °C)    Temp:  [97 7 °F (36 5 °C)] 97 7 °F (36 5 °C)  HR:  [61] 61  Resp:  [19] 19  BP: (132-152)/(69-78) 132/76  SpO2:  [95 %] 95 %  Body mass index is 33 05 kg/m²  Input and Output Summary (last 24 hours): Intake/Output Summary (Last 24 hours) at 3/15/2022 1354  Last data filed at 3/15/2022 0541  Gross per 24 hour   Intake 480 ml   Output 1150 ml   Net -670 ml       Physical Exam:     Physical Exam  Vitals reviewed  Constitutional:       General: He is not in acute distress  Appearance: He is well-developed  He is not diaphoretic  HENT:      Head: Normocephalic and atraumatic     Eyes:      General: No scleral icterus  Conjunctiva/sclera: Conjunctivae normal    Neck:      Thyroid: No thyromegaly  Trachea: No tracheal deviation  Comments: Neck brace attached; small amount of serosanguinous drainage/discharge from surgical site  Cardiovascular:      Rate and Rhythm: Normal rate and regular rhythm  Heart sounds: Normal heart sounds  Pulmonary:      Effort: Pulmonary effort is normal       Breath sounds: Normal breath sounds  Abdominal:      General: Bowel sounds are normal  There is no distension  Palpations: Abdomen is soft  Tenderness: There is no abdominal tenderness  Musculoskeletal:         General: No tenderness  Cervical back: Neck supple  Right lower leg: No edema  Left lower leg: No edema  Lymphadenopathy:      Cervical: No cervical adenopathy  Skin:     General: Skin is warm  Coloration: Skin is not pale  Findings: No erythema  Neurological:      Mental Status: He is alert and oriented to person, place, and time  Psychiatric:         Mood and Affect: Mood normal          Behavior: Behavior normal          Thought Content:  Thought content normal          Judgment: Judgment normal          Additional Data:     Labs:    Results from last 7 days   Lab Units 03/14/22  0821   WBC Thousand/uL 10 59*   HEMOGLOBIN g/dL 13 6   HEMATOCRIT % 41 0   PLATELETS Thousands/uL 160     Results from last 7 days   Lab Units 03/14/22  0821   SODIUM mmol/L 135*   POTASSIUM mmol/L 3 7   CHLORIDE mmol/L 98*   CO2 mmol/L 30   BUN mg/dL 17   CREATININE mg/dL 0 81   ANION GAP mmol/L 7   CALCIUM mg/dL 9 1   GLUCOSE RANDOM mg/dL 177*     Results from last 7 days   Lab Units 03/15/22  0641   INR  1 09     Results from last 7 days   Lab Units 03/15/22  1107 03/15/22  1035 03/15/22  0623 03/14/22  2101 03/14/22  1607 03/14/22  1055 03/14/22  0645 03/13/22  2056 03/13/22  1535 03/13/22  1109 03/13/22  1005 03/13/22  0649   POC GLUCOSE mg/dl 139 154* 129 123 118 109 144* 126 119 142* 153* 149*                   * I Have Reviewed All Lab Data Listed Above  * Additional Pertinent Lab Tests Reviewed:  All Labs Within Last 24 Hours Reviewed    Imaging:    Imaging Reports Reviewed Today Include:   Imaging Personally Reviewed by Myself Includes:      Recent Cultures (last 7 days):           Last 24 Hours Medication List:   Current Facility-Administered Medications   Medication Dose Route Frequency Provider Last Rate    acetaminophen  975 mg Oral [facility] Neomia Shahzad PA-C      amLODIPine  5 mg Oral BID Keyana Wang MD      atorvastatin  40 mg Oral QPM Neomia Shahzad PA-C      calcium carbonate  1,000 mg Oral Daily PRN Neomia Shahzad PA-C      cyclobenzaprine  5 mg Oral TID PRN Savannah Bower MD      dexamethasone  2 mg Oral [facility] NeoVEENA Hartmann-C      Followed by   [facility] dexamethasone  2 mg Oral Q12H [facility] Neomia VEENA Pike-C      Followed by   Ulices Barry ON 3/18/2022] dexamethasone  2 mg Oral Daily Neomia Shahzad PA-C      docusate sodium  100 mg Oral BID Neomia VEENA Pike-C      DULoxetine  60 mg Oral HS Neomia Shahzad PA-C      flecainide  100 mg Oral BID Neomia Shahzad PA-C      heparin (porcine)  3-30 Units/kg/hr (Order-Specific) Intravenous Titrated Savannah Bower MD 13 Units/kg/hr (03/15/22 1144)    heparin (porcine)  4,400 Units Intravenous Q1H PRN Savannah Bower MD      heparin (porcine)  8,800 Units Intravenous Q1H PRN Savannah Bower MD      hydrALAZINE  10 mg Intravenous Q8H PRN Savannah Bower MD      hydrALAZINE  50 mg Oral [facility] Savananh Bower MD      HYDROmorphone  0 2 mg Intravenous Q1H PRN Neomia Shahzad PA-C      insulin lispro  1-5 Units Subcutaneous HS Neomia Shahzad, PA-C      insulin lispro  2-12 Units Subcutaneous 4 times day Neomia VEENA Pike-C      methocarbamol  500 mg Oral Q6H [facility] Neomia Shahzad PA-C      metoprolol succinate  100 mg Oral Daily Margaret Bustos PA-C      ondansetron  4 mg Intravenous Q6H PRN Margaret Bustos PA-C      oxyCODONE  2 5 mg Oral Q4H PRN Margaret Bustos PA-C      oxyCODONE  5 mg Oral Q4H PRN Margaret Bustos PA-C      pantoprazole  40 mg Oral Early Morning Margaret Bustos PA-C      senna  1 tablet Oral Daily Margaret Bustos PA-C      warfarin  3 mg Oral Once (warfarin) Marisol Barr DO          Today, Patient Was Seen By: Marisol Barr DO    ** Please Note: Dictation voice to text software may have been used in the creation of this document   **

## 2022-03-15 NOTE — PLAN OF CARE
Problem: MOBILITY - ADULT  Goal: Maintain or return to baseline ADL function  Description: INTERVENTIONS:  -  Assess patient's ability to carry out ADLs; assess patient's baseline for ADL function and identify physical deficits which impact ability to perform ADLs (bathing, care of mouth/teeth, toileting, grooming, dressing, etc )  - Assess/evaluate cause of self-care deficits   - Assess range of motion  - Assess patient's mobility; develop plan if impaired  - Assess patient's need for assistive devices and provide as appropriate  - Encourage maximum independence but intervene and supervise when necessary  - Involve family in performance of ADLs  - Assess for home care needs following discharge   - Consider OT consult to assist with ADL evaluation and planning for discharge  - Provide patient education as appropriate  Outcome: Progressing  Goal: Maintains/Returns to pre admission functional level  Description: INTERVENTIONS:  - Perform BMAT or MOVE assessment daily    - Set and communicate daily mobility goal to care team and patient/family/caregiver     - Collaborate with rehabilitation services on mobility goals if consulted  - Dangle patient 3 times a day  - Stand patient 3 times a day  - Ambulate patient 3 times a day  - Out of bed to chair 3 times a day   - Out of bed for meals 3 times a day  - Out of bed for toileting  - Record patient progress and toleration of activity level   Outcome: Progressing     Problem: PAIN - ADULT  Goal: Verbalizes/displays adequate comfort level or baseline comfort level  Description: Interventions:  - Encourage patient to monitor pain and request assistance  - Assess pain using appropriate pain scale  - Administer analgesics based on type and severity of pain and evaluate response  - Implement non-pharmacological measures as appropriate and evaluate response  - Notify physician/advanced practitioner if interventions unsuccessful or patient reports new pain  Outcome: Progressing Problem: INFECTION - ADULT  Goal: Absence or prevention of progression during hospitalization  Description: INTERVENTIONS:  - Assess and monitor for signs and symptoms of infection  - Monitor lab/diagnostic results  - Monitor all insertion sites, i e  indwelling lines, tubes, and drains  - [place] appropriate cooling/warming therapies per order  - Administer medications as ordered  - Instruct and encourage patient and family to use good hand hygiene technique  - Identify and instruct in appropriate isolation precautions for identified infection/condition  Outcome: Progressing     Problem: SAFETY ADULT  Goal: Maintain or return to baseline ADL function  Description: INTERVENTIONS:  -  Assess patient's ability to carry out ADLs; assess patient's baseline for ADL function and identify physical deficits which impact ability to perform ADLs (bathing, care of mouth/teeth, toileting, grooming, dressing, etc )  - Assess/evaluate cause of self-care deficits   - Assess range of motion  - Assess patient's mobility; develop plan if impaired  - Assess patient's need for assistive devices and provide as appropriate  - Encourage maximum independence but intervene and supervise when necessary  - Involve family in performance of ADLs  - Assess for home care needs following discharge   - Consider OT consult to assist with ADL evaluation and planning for discharge  - Provide patient education as appropriate  Outcome: Progressing  Goal: Maintains/Returns to pre admission functional level  Description: INTERVENTIONS:  - Perform BMAT or MOVE assessment daily    - Set and communicate daily mobility goal to care team and patient/family/caregiver     - Collaborate with rehabilitation services on mobility goals if consulted  - Dangle patient 3 times a day  - Stand patient 3 times a day  - Ambulate patient 3 times a day  - Out of bed to chair 3 times a day   - Out of bed for meals 3 times a day  - Out of bed for toileting  - Record patient progress and toleration of activity level   Outcome: Progressing  Goal: Patient will remain free of falls  Description: INTERVENTIONS:  - Educate patient/family on patient safety including physical limitations  - Instruct patient to call for assistance with activity   - Consult OT/PT to assist with strengthening/mobility   - Keep Call bell within reach  - Keep bed low and locked with side rails adjusted as appropriate  - Keep care items and personal belongings within reach  - Initiate and maintain comfort rounds  - Make Fall Risk Sign visible to staff  - Initiate/Maintain bedalarm  - Apply yellow socks and bracelet for high fall risk patients  - Consider moving patient to room near nurses station  Outcome: Progressing     Problem: DISCHARGE PLANNING  Goal: Discharge to home or other facility with appropriate resources  Description: INTERVENTIONS:  - Identify barriers to discharge w/patient and caregiver  - Arrange for needed discharge resources and transportation as appropriate  - Identify discharge learning needs (meds, wound care, etc )  - Refer to Case Management Department for coordinating discharge planning if the patient needs post-hospital services based on physician/advanced practitioner order or complex needs related to functional status, cognitive ability, or social support system  Outcome: Progressing     Problem: Knowledge Deficit  Goal: Patient/family/caregiver demonstrates understanding of disease process, treatment plan, medications, and discharge instructions  Description: Complete learning assessment and assess knowledge base  Interventions:  - Provide teaching at level of understanding  - Provide teaching via preferred learning methods  Outcome: Progressing     Problem: Neurological Deficit  Goal: Neurological status is stable or improving  Description: Interventions:  - Monitor and assess patient's level of consciousness, motor function, sensory function, and level of assistance needed for ADLs  - Monitor and report changes from baseline  Collaborate with interdisciplinary team to initiate plan and implement interventions as ordered  - Provide and maintain a safe environment  - Consider seizure precautions  - Consider fall precautions  - Consider aspiration precautions  - Consider bleeding precautions  Outcome: Progressing     Problem: Activity Intolerance/Impaired Mobility  Goal: Mobility/activity is maintained at optimum level for patient  Description: Interventions:  - Assess and monitor patient  barriers to mobility and need for assistive/adaptive devices  - Assess patient's emotional response to limitations  - Collaborate with interdisciplinary team and initiate plans and interventions as ordered  - Encourage independent activity per ability   - Maintain proper body alignment  - Perform active/passive rom as tolerated/ordered  - Plan activities to conserve energy   - Turn patient as appropriate  Outcome: Progressing     Problem: Communication Impairment  Goal: Ability to express needs and understand communication  Description: Assess patient's communication skills and ability to understand information  Patient will demonstrate use of effective communication techniques, alternative methods of communication and understanding even if not able to speak  - Encourage communication and provide alternate methods of communication as needed  - Collaborate with case management/ for discharge needs  - Include patient/family/caregiver in decisions related to communication  Outcome: Progressing     Problem: Potential for Aspiration  Goal: Non-ventilated patient's risk of aspiration is minimized  Description: Assess and monitor vital signs, respiratory status, and labs (WBC)  Monitor for signs of aspiration (tachypnea, cough, rales, wheezing, cyanosis, fever)  - Assess and monitor patient's ability to swallow  - Place patient up in chair to eat if possible    - HOB up at 80 degrees to eat if unable to get patient up into chair   - Supervise patient during oral intake  - Instruct patient/ family to take small bites  - Instruct patient/ family to take small single sips when taking liquids  - Follow patient-specific strategies generated by speech pathologist   Outcome: Progressing     Problem: Nutrition  Goal: Nutrition/Hydration status is improving  Description: Monitor and assess patient's nutrition/hydration status for malnutrition (ex- brittle hair, bruises, dry skin, pale skin and conjunctiva, muscle wasting, smooth red tongue, and disorientation)  Collaborate with interdisciplinary team and initiate plan and interventions as ordered  Monitor patient's weight and dietary intake as ordered or per policy  Utilize nutrition screening tool and intervene per policy  Determine patient's food preferences and provide high-protein, high-caloric foods as appropriate  - Assist patient with eating   - Allow adequate time for meals   - Encourage patient to take dietary supplement as ordered  - Collaborate with clinical nutritionist   - Include patient/family/caregiver in decisions related to nutrition  Outcome: Progressing     Problem: Nutrition/Hydration-ADULT  Goal: Nutrient/Hydration intake appropriate for improving, restoring or maintaining nutritional needs  Description: Monitor and assess patient's nutrition/hydration status for malnutrition  Collaborate with interdisciplinary team and initiate plan and interventions as ordered  Monitor patient's weight and dietary intake as ordered or per policy  Utilize nutrition screening tool and intervene as necessary  Determine patient's food preferences and provide high-protein, high-caloric foods as appropriate       INTERVENTIONS:  - Monitor oral intake, urinary output, labs, and treatment plans  - Assess nutrition and hydration status and recommend course of action  - Evaluate amount of meals eaten  - Assist patient with eating if necessary   - Allow adequate time for meals  - Recommend/ encourage appropriate diets, oral nutritional supplements, and vitamin/mineral supplements  - Order, calculate, and assess calorie counts as needed  - Assess need for intravenous fluids  - Provide specific nutrition/hydration education as appropriate  - Include patient/family/caregiver in decisions related to nutrition  Outcome: Progressing     Problem: Potential for Falls  Goal: Patient will remain free of falls  Description: INTERVENTIONS:  - Educate patient/family on patient safety including physical limitations  - Instruct patient to call for assistance with activity   - Consult OT/PT to assist with strengthening/mobility   - Keep Call bell within reach  - Keep bed low and locked with side rails adjusted as appropriate  - Keep care items and personal belongings within reach  - Initiate and maintain comfort rounds  - Make Fall Risk Sign visible to staff  - Initiate/Maintain bedalarm  - Apply yellow socks and bracelet for high fall risk patients  - Consider moving patient to room near nurses station  Outcome: Progressing     Problem: Prexisting or High Potential for Compromised Skin Integrity  Goal: Skin integrity is maintained or improved  Description: INTERVENTIONS:  - Identify patients at risk for skin breakdown  - Assess and monitor skin integrity  - Assess and monitor nutrition and hydration status  - Monitor labs   - Assess for incontinence   - Turn and reposition patient  - Assist with mobility/ambulation  - Relieve pressure over bony prominences  - Avoid friction and shearing  - Provide appropriate hygiene as needed including keeping skin clean and dry  - Evaluate need for skin moisturizer/barrier cream  - Collaborate with interdisciplinary team   - Patient/family teaching  - Consider wound care consult   Outcome: Progressing

## 2022-03-15 NOTE — OCCUPATIONAL THERAPY NOTE
Occupational Therapy Treatment Note     03/15/22 1029   OT Last Visit   OT Visit Date 03/15/22   Note Type   Note Type Treatment   Restrictions/Precautions   Weight Bearing Precautions Per Order No   Braces or Orthoses C/S Collar   Other Precautions Spinal precautions;Multiple lines;Pain; Fall Risk   Lifestyle   Autonomy Pt was I in ADLs, IADLs, and used a cane PTA  Pt A with his wife needs  (+)   Reciprocal Relationships Pt lives with his wife and A with her needs  Pt reports his wife is sick and is unable to A with his needs  Service to Others Pt is retired  Intrinsic Gratification Pt enjoys being with his dog  Pain Assessment   Pain Assessment Tool 0-10   ADL   Where Assessed Sitting at sink   Grooming Assistance 5  Supervision/Setup   Grooming Deficit Setup   UB Bathing Assistance 5  Supervision/Setup   UB Bathing Deficit Increased time to complete;Setup   LB Bathing Assistance 3  Moderate Assistance   LB Bathing Deficit Right lower leg including foot; Left lower leg including foot; Buttocks   UB Dressing Assistance 4  Minimal Assistance   UB Dressing Deficit Thread RUE; Thread LUE;Pull around back   LB Dressing Assistance 4  Minimal Assistance   LB Dressing Deficit Pull up over hips   Toileting Assistance  4  Minimal Assistance   Toileting Deficit Clothing management up   Transfers   Sit to Stand 5  Supervision   Additional items Assist x 1   Stand to Sit 5  Supervision   Additional items Assist x 1   Functional Mobility   Functional Mobility 4  Minimal assistance   Additional items Rolling walker   Toilet Transfers   Toilet Transfer From Rolling walker   Toilet Transfer Type To and from   Toilet Transfer to Standard toilet   Toilet Transfer Technique Ambulating   Toilet Transfers Minimal assistance   Cognition   Overall Cognitive Status WFL   Arousal/Participation Responsive; Alert;Arousable; Cooperative   Attention Within functional limits   Orientation Level Oriented X4   Memory Decreased recall of precautions   Following Commands Follows one step commands with increased time or repetition   Activity Tolerance   Activity Tolerance Patient tolerated treatment well   Assessment   Assessment Pt seen for occupational therapy session with focus on activity tolerance, functional transfers/mob, toilet transfers/toileting, standing tolerance and balance for pt engagement and UB/LB self-care tasks, proper body mechanics and energy conservation techniques  Pt cleared by MIREILLE/Song for pt participation in OT session  Pt presented sitting edge of bed upon initiation of OT session  Pt agreeable to participate in OT session following pt identifiers confirmed  Pt required assist for toilet transfers 2* to pt coordination balance and decreased strength  Pt instructed on energy conservation techniques and proper posture body mechanics  Pt able to verbalize good understanding  Pt able to tolerate AM  Self-care with no complaints of pain or fatigue  Pt's RN informed of pt need for dressing change and pt RN able to complete task during session  Pt able to tolerate well  Pt will require post acute rehab services to continue to address pt noted deficits with deconditioning decreased strength, and limited activity tolerance  Pt return to bedside chair post session all needs within reach     Plan   Treatment Interventions ADL retraining   Goal Expiration Date 03/26/22   OT Treatment Day 1   OT Frequency 3-5x/wk   Recommendation   OT Discharge Recommendation Post acute rehabilitation services   AM-PAC Daily Activity Inpatient   Lower Body Dressing 2   Bathing 2   Toileting 3   Upper Body Dressing 3   Grooming 3   Eating 4   Daily Activity Raw Score 17   Daily Activity Standardized Score (Calc for Raw Score >=11) 37 26   AM-PAC Applied Cognition Inpatient   Following a Speech/Presentation 4   Understanding Ordinary Conversation 4   Taking Medications 4   Remembering Where Things Are Placed or Put Away 4   Remembering List of 4-5 Errands 4   Taking Care of Complicated Tasks 4   Applied Cognition Raw Score 24   Applied Cognition Standardized Score 62 21   Modified Wheeler Scale   Modified Wheeler Scale 4       Mitcheal Rios  BERNARD/L

## 2022-03-15 NOTE — PLAN OF CARE
Problem: PHYSICAL THERAPY ADULT  Goal: Performs mobility at highest level of function for planned discharge setting  See evaluation for individualized goals  Description: Treatment/Interventions: Functional transfer training,LE strengthening/ROM,Therapeutic exercise,Endurance training,Patient/family training,Equipment eval/education,Bed mobility,Gait training,Elevations,Spoke to nursing          See flowsheet documentation for full assessment, interventions and recommendations  Outcome: Progressing  Note: Prognosis: Good  Problem List: Decreased strength,Decreased endurance,Impaired balance,Decreased mobility  Assessment: Pt presents with decreased mobility, strength, balance, and activity tolerance  Pt seen for PT session focusing on bed mobility, transfers, standing tolerance/balance, mobility, and LE strengthening  Pt continues to demonstrate progression however continues to require Min A for mobility and relies on RW  Pt ambulates w/ narrow AZRA and trendelenburg often hitting LLE w/ RLE leading to high fall risk  Extensive discission had w/ pt regarding d/c plan  Pt reports spouse has pulmonary fibrosis and on O2 at baseline and fair health  Pt reports they share IADLs however spouse would not be able assist physically  At this time, recommendation is STR to improve balance, LE strength, and functional independence before d/c home- recommend PMR consult  Pt continues to benefit from skilled therapy to maximize functional independence  Recommendation at this time is rehab  Pt would benefit from continued ambulation, functional transfers, strength, balance, and activity tolerance  Barriers to Discharge: Inaccessible home environment,Decreased caregiver support        PT Discharge Recommendation: (S) Post acute rehabilitation services (PMR consult)          See flowsheet documentation for full assessment

## 2022-03-15 NOTE — RESTORATIVE TECHNICIAN NOTE
Restorative Technician Note      Patient Name: Amanda Dubois     Note Type: Mobility  Patient Position Upon Consult: Supine  Activity Performed: Ambulated; [id number]; Stood  Assistive Device: Roller walker  Brace Applied: [address] Set  Education Provided: Yes  Patient Position at [employer] of Consult: Bedside chair;  All needs within reach; Bed/Chair alarm activated    Blair Deleon BS, Restorative Technician, [employer]

## 2022-03-15 NOTE — PLAN OF CARE
Problem: OCCUPATIONAL THERAPY ADULT  Goal: Performs self-care activities at highest level of function for planned discharge setting  See evaluation for individualized goals  Description: Treatment Interventions: ADL retraining,Functional transfer training,Endurance training,Continued evaluation,Energy conservation,Activityengagement,Compensatory technique education          See flowsheet documentation for full assessment, interventions and recommendations  Outcome: Progressing  Note: Limitation: Decreased ADL status,Decreased endurance,Decreased self-care trans,Decreased high-level ADLs  Prognosis: Fair  Assessment: Pt seen for occupational therapy session with focus on activity tolerance, functional transfers/mob, toilet transfers/toileting, standing tolerance and balance for pt engagement and UB/LB self-care tasks, proper body mechanics and energy conservation techniques  Pt cleared by MIREILLE/Song for pt participation in OT session  Pt presented sitting edge of bed upon initiation of OT session  Pt agreeable to participate in OT session following pt identifiers confirmed  Pt required assist for toilet transfers 2* to pt coordination balance and decreased strength  Pt instructed on energy conservation techniques and proper posture body mechanics  Pt able to verbalize good understanding  Pt able to tolerate AM  Self-care with no complaints of pain or fatigue  Pt's RN informed of pt need for dressing change and pt RN able to complete task during session  Pt able to tolerate well  Pt will require post acute rehab services to continue to address pt noted deficits with deconditioning decreased strength, and limited activity tolerance  Pt return to bedside chair post session all needs within reach       OT Discharge Recommendation: Post acute rehabilitation services  OT - OK to Discharge: Yes (when medically appropriate)

## 2022-03-15 NOTE — RESTORATIVE TECHNICIAN NOTE
Restorative Technician Note      Patient Name: Kirsten Hill     Note Type: Mobility  Patient Position Upon Consult: Bedside chair  Activity Performed: Ambulated; Dangled; Stood  Assistive Device: Roller walker; Other (Comment) (Assist x1 with chair follow )  Brace Applied: [address] Set  Education Provided: Yes  Patient Position at End of Consult: Supine;  All needs within reach; Bed/Chair alarm activated    Pacheco VILLANUEVA, Restorative Technician, [employer]

## 2022-03-15 NOTE — PHYSICAL THERAPY NOTE
PHYSICAL THERAPY NOT  Patient Name: Ty Gurrola  HQRRO'K Date: 3/15/2022     03/15/22 1308   PT Last Visit   PT Visit Date 03/15/22   Note Type   Note Type Treatment   Pain Assessment   Pain Assessment Tool 0-10   Pain Score 3   Pain Location/Orientation Location: Neck   Hospital Pain Intervention(s) Repositioned; Ambulation/increased activity; Emotional support   Restrictions/Precautions   Weight Bearing Precautions Per Order No   Braces or Orthoses C/S Collar   Other Precautions Multiple lines;Spinal precautions;Pain; Fall Risk   General   Chart Reviewed Yes   Response to Previous Treatment Patient with no complaints from previous session  Family/Caregiver Present No   Cognition   Overall Cognitive Status WFL   Arousal/Participation Alert; Responsive; Cooperative   Attention Within functional limits   Orientation Level Oriented X4   Following Commands Follows one step commands with increased time or repetition   Comments Pt very pleasant and cooperative   Subjective   Subjective "I'm feeling better"   Bed Mobility   Supine to Sit 5  Supervision   Additional items HOB elevated; Increased time required   Sit to Supine 5  Supervision   Additional items HOB elevated; Increased time required   Additional Comments Pt lying supine upon PT arrival  Pt returned lying supine at end of session w/ all needs within reach   Transfers   Sit to Stand 5  Supervision   Additional items Verbal cues   Stand to Sit 5  Supervision   Additional items Verbal cues   Additional Comments Transfers w/ RW  Pt ambulates w/ narrow AZRA, often hitting LLE w/ RLE leading to high fall risk, and trendelenburg gait  Ambulation/Elevation   Gait pattern Excessively slow; Short stride; Inconsistent brooks;Narrow AZRA; Trendelburg   Gait Assistance 4  Minimal assist   Additional items Assist x 1;Verbal cues   Assistive Device Rolling walker   Distance 50ft x2   Stair Management Assistance Not tested   Balance   Static Sitting Fair +   Dynamic Sitting Fair   Static Standing Fair -   Dynamic Standing Poor +   Ambulatory Poor +   Endurance Deficit   Endurance Deficit Yes   Endurance Deficit Description weakness, fatigue   Activity Tolerance   Activity Tolerance Patient tolerated treatment well   Nurse Made Aware yes   Assessment   Prognosis Good   Problem List Decreased strength;Decreased endurance; Impaired balance;Decreased mobility   Assessment Pt presents with decreased mobility, strength, balance, and activity tolerance  Pt seen for PT session focusing on bed mobility, transfers, standing tolerance/balance, mobility, and LE strengthening  Pt continues to demonstrate progression however continues to require Min A for mobility and relies on RW  Pt ambulates w/ narrow AZRA and trendelenburg often hitting LLE w/ RLE leading to high fall risk  Extensive discission had w/ pt regarding d/c plan  Pt reports spouse has pulmonary fibrosis and on O2 at baseline and fair health  Pt reports they share IADLs however spouse would not be able assist physically  At this time, recommendation is STR to improve balance, LE strength, and functional independence before d/c home- recommend PMR consult  Pt continues to benefit from skilled therapy to maximize functional independence  Recommendation at this time is rehab  Pt would benefit from continued ambulation, functional transfers, strength, balance, and activity tolerance   Barriers to Discharge Inaccessible home environment;Decreased caregiver support   Goals   Patient Goals to get better   STG Expiration Date 03/26/22   PT Treatment Day 1   Plan   Treatment/Interventions Functional transfer training;LE strengthening/ROM; Elevations; Therapeutic exercise; Endurance training;Patient/family training;Equipment eval/education; Bed mobility;Gait training;Spoke to nursing   Progress Progressing toward goals   PT Frequency 3-5x/wk   Recommendation   PT Discharge Recommendation Post acute rehabilitation services  (PMR consult)   Additional Comments Pt reports spouse has pulmonary fibrosis and on O2 at baseline and fair health  Pt reports they share IADLs however spouse would not be able assist physically  At this time, recommendation is STR to improve balance, LE strength, and functional independence before d/c home- recommend PMR consult   AM-PAC Basic Mobility Inpatient   Turning in Bed Without Bedrails 3   Lying on Back to Sitting on Edge of Flat Bed 3   Moving Bed to Chair 3   Standing Up From Chair 3   Walk in Room 3   Climb 3-5 Stairs 3   Basic Mobility Inpatient Raw Score 18   Basic Mobility Standardized Score 41 05   Highest Level Of Mobility   JH-HLM Goal 6: Walk 10 steps or more   JH-HLM Highest Level of Mobility 7: Walk 25 feet or more   JH-HLM Goal Achieved Yes   The patient's AM-PAC Basic Mobility Inpatient Short Form Raw Score is 18  A Raw score of greater than 16 suggests the patient may benefit from discharge to home  Please also refer to the recommendation of the Physical Therapist for safe discharge planning      Gayathri Jason, PT, DPT

## 2022-03-16 LAB
ANION GAP SERPL CALCULATED.3IONS-SCNC: 6 MMOL/L (ref 4–13)
APTT PPP: 104 SECONDS (ref 23–37)
APTT PPP: 60 SECONDS (ref 23–37)
APTT PPP: 67 SECONDS (ref 23–37)
BASOPHILS # BLD AUTO: 0.01 THOUSANDS/ΜL (ref 0–0.1)
BASOPHILS NFR BLD AUTO: 0 % (ref 0–1)
BUN SERPL-MCNC: 16 MG/DL (ref 5–25)
CALCIUM SERPL-MCNC: 9 MG/DL (ref 8.3–10.1)
CHLORIDE SERPL-SCNC: 101 MMOL/L (ref 100–108)
CO2 SERPL-SCNC: 27 MMOL/L (ref 21–32)
CREAT SERPL-MCNC: 0.68 MG/DL (ref 0.6–1.3)
EOSINOPHIL # BLD AUTO: 0 THOUSAND/ΜL (ref 0–0.61)
EOSINOPHIL NFR BLD AUTO: 0 % (ref 0–6)
ERYTHROCYTE [DISTWIDTH] IN BLOOD BY AUTOMATED COUNT: 13.2 % (ref 11.6–15.1)
GFR SERPL CREATININE-BSD FRML MDRD: 96 ML/MIN/1.73SQ M
GLUCOSE SERPL-MCNC: 112 MG/DL (ref 65–140)
GLUCOSE SERPL-MCNC: 114 MG/DL (ref 65–140)
GLUCOSE SERPL-MCNC: 123 MG/DL (ref 65–140)
GLUCOSE SERPL-MCNC: 127 MG/DL (ref 65–140)
GLUCOSE SERPL-MCNC: 131 MG/DL (ref 65–140)
GLUCOSE SERPL-MCNC: 143 MG/DL (ref 65–140)
HCT VFR BLD AUTO: 37.8 % (ref 36.5–49.3)
HGB BLD-MCNC: 13.1 G/DL (ref 12–17)
IMM GRANULOCYTES # BLD AUTO: 0.08 THOUSAND/UL (ref 0–0.2)
IMM GRANULOCYTES NFR BLD AUTO: 1 % (ref 0–2)
INR PPP: 1.2 (ref 0.84–1.19)
LYMPHOCYTES # BLD AUTO: 0.96 THOUSANDS/ΜL (ref 0.6–4.47)
LYMPHOCYTES NFR BLD AUTO: 9 % (ref 14–44)
MCH RBC QN AUTO: 30.5 PG (ref 26.8–34.3)
MCHC RBC AUTO-ENTMCNC: 34.7 G/DL (ref 31.4–37.4)
MCV RBC AUTO: 88 FL (ref 82–98)
MONOCYTES # BLD AUTO: 0.81 THOUSAND/ΜL (ref 0.17–1.22)
MONOCYTES NFR BLD AUTO: 8 % (ref 4–12)
NEUTROPHILS # BLD AUTO: 8.35 THOUSANDS/ΜL (ref 1.85–7.62)
NEUTS SEG NFR BLD AUTO: 82 % (ref 43–75)
NRBC BLD AUTO-RTO: 0 /100 WBCS
PLATELET # BLD AUTO: 177 THOUSANDS/UL (ref 149–390)
PMV BLD AUTO: 11.1 FL (ref 8.9–12.7)
POTASSIUM SERPL-SCNC: 3.8 MMOL/L (ref 3.5–5.3)
PROTHROMBIN TIME: 14.7 SECONDS (ref 11.6–14.5)
RBC # BLD AUTO: 4.3 MILLION/UL (ref 3.88–5.62)
SODIUM SERPL-SCNC: 134 MMOL/L (ref 136–145)
WBC # BLD AUTO: 10.21 THOUSAND/UL (ref 4.31–10.16)

## 2022-03-16 PROCEDURE — 85730 THROMBOPLASTIN TIME PARTIAL: CPT | Performed by: INTERNAL MEDICINE

## 2022-03-16 PROCEDURE — 80048 BASIC METABOLIC PNL TOTAL CA: CPT | Performed by: STUDENT IN AN ORGANIZED HEALTH CARE EDUCATION/TRAINING PROGRAM

## 2022-03-16 PROCEDURE — 82948 REAGENT STRIP/BLOOD GLUCOSE: CPT

## 2022-03-16 PROCEDURE — 85025 COMPLETE CBC W/AUTO DIFF WBC: CPT | Performed by: STUDENT IN AN ORGANIZED HEALTH CARE EDUCATION/TRAINING PROGRAM

## 2022-03-16 PROCEDURE — 99232 SBSQ HOSP IP/OBS MODERATE 35: CPT | Performed by: INTERNAL MEDICINE

## 2022-03-16 PROCEDURE — 99222 1ST HOSP IP/OBS MODERATE 55: CPT | Performed by: NURSE PRACTITIONER

## 2022-03-16 PROCEDURE — 85610 PROTHROMBIN TIME: CPT | Performed by: STUDENT IN AN ORGANIZED HEALTH CARE EDUCATION/TRAINING PROGRAM

## 2022-03-16 PROCEDURE — 94760 N-INVAS EAR/PLS OXIMETRY 1: CPT

## 2022-03-16 RX ORDER — WARFARIN SODIUM 5 MG/1
5 TABLET ORAL
Status: COMPLETED | OUTPATIENT
Start: 2022-03-16 | End: 2022-03-16

## 2022-03-16 RX ADMIN — AMLODIPINE BESYLATE 5 MG: 5 TABLET ORAL at 21:58

## 2022-03-16 RX ADMIN — WARFARIN SODIUM 5 MG: 5 TABLET ORAL at 18:06

## 2022-03-16 RX ADMIN — FLECAINIDE ACETATE 100 MG: 100 TABLET ORAL at 18:06

## 2022-03-16 RX ADMIN — DOCUSATE SODIUM 100 MG: 100 CAPSULE ORAL at 18:06

## 2022-03-16 RX ADMIN — HEPARIN SODIUM 11 UNITS/KG/HR: 10000 INJECTION, SOLUTION INTRAVENOUS; SUBCUTANEOUS at 16:16

## 2022-03-16 RX ADMIN — METHOCARBAMOL 500 MG: 500 TABLET, FILM COATED ORAL at 18:06

## 2022-03-16 RX ADMIN — ACETAMINOPHEN 325MG 975 MG: 325 TABLET ORAL at 21:57

## 2022-03-16 RX ADMIN — DULOXETINE HYDROCHLORIDE 60 MG: 60 CAPSULE, DELAYED RELEASE ORAL at 21:56

## 2022-03-16 RX ADMIN — DOCUSATE SODIUM 100 MG: 100 CAPSULE ORAL at 08:00

## 2022-03-16 RX ADMIN — HYDRALAZINE HYDROCHLORIDE 50 MG: 50 TABLET ORAL at 05:11

## 2022-03-16 RX ADMIN — HYDRALAZINE HYDROCHLORIDE 50 MG: 50 TABLET ORAL at 21:58

## 2022-03-16 RX ADMIN — METHOCARBAMOL 500 MG: 500 TABLET, FILM COATED ORAL at 01:11

## 2022-03-16 RX ADMIN — ATORVASTATIN CALCIUM 40 MG: 40 TABLET, FILM COATED ORAL at 18:06

## 2022-03-16 RX ADMIN — ACETAMINOPHEN 325MG 975 MG: 325 TABLET ORAL at 05:10

## 2022-03-16 RX ADMIN — METOPROLOL SUCCINATE 100 MG: 100 TABLET, EXTENDED RELEASE ORAL at 08:00

## 2022-03-16 RX ADMIN — PANTOPRAZOLE SODIUM 40 MG: 40 TABLET, DELAYED RELEASE ORAL at 05:11

## 2022-03-16 RX ADMIN — ACETAMINOPHEN 325MG 975 MG: 325 TABLET ORAL at 13:59

## 2022-03-16 RX ADMIN — HYDRALAZINE HYDROCHLORIDE 50 MG: 50 TABLET ORAL at 13:59

## 2022-03-16 RX ADMIN — DEXAMETHASONE 2 MG: 2 TABLET ORAL at 21:57

## 2022-03-16 RX ADMIN — FLECAINIDE ACETATE 100 MG: 100 TABLET ORAL at 08:02

## 2022-03-16 RX ADMIN — DEXAMETHASONE 2 MG: 2 TABLET ORAL at 08:00

## 2022-03-16 RX ADMIN — METHOCARBAMOL 500 MG: 500 TABLET, FILM COATED ORAL at 05:10

## 2022-03-16 RX ADMIN — METHOCARBAMOL 500 MG: 500 TABLET, FILM COATED ORAL at 12:30

## 2022-03-16 RX ADMIN — AMLODIPINE BESYLATE 5 MG: 5 TABLET ORAL at 08:00

## 2022-03-16 NOTE — RESTORATIVE TECHNICIAN NOTE
Restorative Technician Note      Patient Name: Kirsten Hill     Note Type: Mobility  Patient Position Upon Consult: Supine  Activity Performed: Ambulated; IUJPBPV; Stood  Assistive Device: Roller walker; Other (Comment) (Assist x1 with chair follow )  Brace Applied: [address] Set  Education Provided: Yes  Patient Position at End of Consult: Supine;  All needs within reach; Bed/Chair alarm activated    Pacheco VILLANUEVA, Restorative Technician, [employer]

## 2022-03-16 NOTE — PROGRESS NOTES
[facility] [facility]  Progress Note - Faina Salguero 1950, 71 y o  male MRN: [id number]  Unit/Bed#: [facility] 395-59 Encounter: [id number]  Primary Care Provider: Kelly Sinha MD   Date and time admitted to hospital: 3/5/2022  8:18 PM    * Cervical myelopathy [facility])  Assessment & Plan  POD4 Anterior cervical discectomy and fixation fusion C5/6 and C6/7; Posterior cervical decompression and instrumented fusion C3-T1  · Pre op- severe canal stenosis C5-6 with small area of signal abnormality   Presented with significant decline in ambulation along with numbness and weakness R>L over the last week  Reports multiple falls  Imaging:    Cervical spine x-ray 03/11/2022:ACDF C5-C7 and posterior fusion hardware from C3 through T1 as above  Plan:   Continue monitor neurological exam and symptoms   Vista collar to remain in place at all times, okay for [place] collar for showering   Continue Decadron 48hr taper as ordered   Given 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, planning for inpatient transition to Coumadin heparin bridge  o Given patient with therapeutic APTT on heparin with stable neurological exam and no new complaints, may transition/bridge to Coumadin   Medical management and pain control per primary team   Anterior and posterior drains removed 3/13  · Mobilize with PT/OT, recommending acute rehab  · Patient concerning [facility] if he does not improve to clearance for discharge home  · Encouraged incentive spirometer  · DVT PPX: SCDs and heparin gtt  · Monitor closely for any swallowing, speech or breathing difficulties  Monitor for any new neuro complaints  Neurosurgery will follow from the periphery and see patient as needed remainder of hospitalization, call with any further questions or concerns  Plan outpatient follow-up for two week and six week postoperative visit           Subjective/Objective   Chief Complaint:  I feel good    Subjective:  Patient offers no particular complaints  He admits to some mild ongoing posterior scapular tenderness  Denies any swallowing breathing or speech difficulties  Denies any new radicular pain, numbness, tingling and or weakness  Admits to ambulating 3 times today with improvement in his gait  Denies any bowel or bladder dysfunction  Voiding well  Admits to bowel movement  Tolerating p o  No chest pain or shortness of breath  Objective:  Sitting in bed  NAD  I/O       03/14 0701  03/15 0700 03/15 0701 03/16 0700    P  O  480     Total Intake(mL/kg) 480 (4 5)     Urine (mL/kg/hr) 1150 (0 4) 200 (0 1)    Stool  0    Total Output 1150 200    Net -670 -200          Unmeasured Stool Occurrence  1 x          Invasive Devices  Report    Peripheral Intravenous Line            Peripheral IV 03/13/22 Right Arm 2 days                Physical Exam:  Vitals: Blood pressure 148/75, pulse 55, temperature 98 1 °F (36 7 °C), resp  rate 16, weight 107 kg (236 lb 15 9 oz), SpO2 95 %  ,Body mass index is 33 05 kg/m²  General appearance: alert, appears stated age, cooperative and no distress  Head: Normocephalic, without obvious abnormality  Eyes:  Conjugate gaze and tracks appropriately in room  Neck:  Collar in place  Anterior Steri-Strips in place    Lungs: non labored breathing  Heart: regular heart rate  Neurologic:   Mental status: Alert, oriented, thought content appropriate  Cranial nerves: grossly intact (Cranial nerves II-XII)  Sensory: normal to LT x4  Motor: moving all extremities without focal weakness  Reflexes:  Right clonus    Lab Results:  Results from last 7 days   Lab Units 03/14/22  0821 03/13/22  0843 03/12/22  0638   WBC Thousand/uL 10 59* 13 33* 13 85*   HEMOGLOBIN g/dL 13 6 14 0 13 2   HEMATOCRIT % 41 0 41 1 37 4   PLATELETS Thousands/uL 160 176 182     Results from last 7 days   Lab Units 03/14/22  0821 03/13/22  0843 03/12/22  0639   POTASSIUM mmol/L 3 7 4 1 3 8 CHLORIDE mmol/L 98* 101 102   CO2 mmol/L 30 30 29   BUN mg/dL 17 16 18   CREATININE mg/dL 0 81 0 76 0 96   CALCIUM mg/dL 9 1 9 0 9 3     Results from last 7 days   Lab Units 03/10/22  0232   MAGNESIUM mg/dL 2 0         Results from last 7 days   Lab Units 03/15/22  1726 03/15/22  1048 03/15/22  0641 03/15/22  0130 03/14/22  1734 03/14/22  0821 03/12/22  0639 03/12/22  0639   INR   --   --  1 09  --   --  1 06  --  1 14   PTT seconds 85* 72*  --  136*   < > 25   < > 21*    < > = values in this interval not displayed  No results found for: TROPONINT  ABG:No results found for: PHART, XZU8OVM, PO2ART, JBV1LXO, Q6WEEXUT, BEART, SOURCE    Imaging Studies: I have personally reviewed pertinent reports  and I have personally reviewed pertinent films in PACS    MRI brain wo contrast    Result Date: 3/5/2022  Impression: No acute intracranial pathology  Mild chronic microangiopathy  Workstation performed: [id number]     MRI cervical spine w wo contrast    Result Date: 3/5/2022  Impression: No acute abnormality  Motion limited    Multilevel degenerative spondylosis progressed from 2013 most severe at C5-6 with severe canal stenosis and flattening of the cord  Small focus of T2 signal within the cord at C5-C6 may represent myelomalacia  Moderate canal stenosis at C3-4 mildly indents the cord without signal abnormality  Marked multilevel foraminal stenosis  No prevertebral edema or mass   Workstation performed: [id number]     EKG, Pathology, and Other Studies: none    VTE Pharmacologic Prophylaxis: Heparin    VTE Mechanical Prophylaxis: sequential compression device

## 2022-03-16 NOTE — ASSESSMENT & PLAN NOTE
POD4 Anterior cervical discectomy and fixation fusion C5/6 and C6/7; Posterior cervical decompression and instrumented fusion C3-T1  · Pre op- severe canal stenosis C5-6 with small area of signal abnormality   Presented with significant decline in ambulation along with numbness and weakness R>L over the last week  Reports multiple falls  Imaging:    Cervical spine x-ray 03/11/2022:ACDF C5-C7 and posterior fusion hardware from C3 through T1 as above  Plan:   Continue monitor neurological exam and symptoms   Vista collar to remain in place at all times, okay for [place] collar for showering   Continue Decadron 48hr taper as ordered   Given 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, planning for inpatient transition to Coumadin heparin bridge  o Given patient with therapeutic APTT on heparin with stable neurological exam and no new complaints, may transition/bridge to Coumadin   Medical management and pain control per primary team   Anterior and posterior drains removed 3/13  · Mobilize with PT/OT, recommending acute rehab  · Patient concerning [facility] re[facility] if he does not improve to clearance for discharge home  · Encouraged incentive spirometer  · DVT PPX: SCDs and heparin gtt  · Monitor closely for any swallowing, speech or breathing difficulties  Monitor for any new neuro complaints  Neurosurgery will follow from the periphery and see patient as needed remainder of hospitalization, call with any further questions or concerns  Plan outpatient follow-up for two week and six week postoperative visit

## 2022-03-16 NOTE — CONSULTS
PHYSICAL MEDICINE AND REHABILITATION CONSULT NOTE  Alexandria Bernard 71 y o  male MRN: [id number]  Unit/Bed#: [facility] 713-01 Encounter: [id number]    Requested by (Physician/Service): Candice Buenrostro MD  Reason for Consultation:  Assessment of rehabilitation needs    Assessment:  Rehabilitation Diagnosis:    Severe cervical stenosis s/p anterior cervical disectomy and fixation fusion C5-C6 and C6/7 as well as posterior cervical decompression and instrumented fusion C3-T1   Impaired mobility and self care    Recommendations:  Rehabilitation Plan:   Continue PT/OT while on acute care   Based on current function the patient would likely benefit from a short course of inpatient rehabilitation however would like to d/c to home with therapies if he continues to improve functionally  Will follow at this time   Covid-19 Testing: [facility] inpatient rehabilitation units require testing within 48 hours of all potential admissions at this time  *Re-testing is NOT required for patients recovering from COVID-19 infection if isolation has been discontinued per CDC criteria  Medical Co-morbidities Plan:  · Factor V Leiden on coumadin to heparin bridge  · Atrial fibrillation on coumadin to heparin bridge  · Bilateral knee osteoarthritis   · Obstructive sleep apnea  · DVT ppx: heparin drip    Thank you for this consultation  Do not hesitate to contact service with further questions  DEEPTHI Love  PM&R    History of Present Illness:  Alexandria Bernard is a 71 y o  male with a PMH of factor 5 Leiden, atrial fibrillation on coumadin, WILL, bilateral knee osteoarthritis and back pain who presented to the [address] with significantly worsening right sided weakness and ambulatory dysfunction over 3 to 4 weeks without a clear etiology  He also reported urinary incontinence with increased urgency  He was found to have severe canal stenosis C5-C6 with small area of signal abnormality    He was placed on decadron and underwent anterior cervical disectomy and fixation fusion C5-C6 and C6/7 as well as posterior cervical decompression and instrumented fusion C3-T1  Post-operative x-ray was stable  He is currently on a decadron taper as well as a coumadin bridge  PM&R are consulted for rehabilitation recommendations  The patient was seen in his room  He reports that his pain is gone and he is feeling much better than he expected after surgery  He reported that he thought his pain and difficulty ambulating was due to arthritis of his knees however his pain and ambulation has much improved since surgery  He also reports that he no longer is experiencing urinary retention/urgency and is able to urinate on his own  He denies any weakness or pain currently, denies any numbness or tingling  Review of Systems: 10 point ROS negative except for what is noted in HPI    Function:  Prior level of function and living situation:  The patient lives in a multi-level home with 4 + 1 MICHELLE  The patient reports that he lives with his spouse who does have a terminal diagnosis and is on oxygen however she is ambulatory and still drives short distances  He reports 4 supportive children who can provide some assistance in the home as well however they do work  Current level of function:  Physical Therapy:  Supervision for bed mobility, transfers and minimal assist for ambulation going 50 feet x 2  Occupational Therapy:  Supervision for grooming UB bathing, moderate assist for LB bathing, minimal assist for UB dressing, LB dressing and toileting  Physical Exam:  /66   Pulse 56   Temp 97 8 °F (36 6 °C)   Resp 16   Wt 107 kg (236 lb 15 9 oz)   SpO2 95%   BMI 33 05 kg/m²        Intake/Output Summary (Last 24 hours) at 3/16/2022 1008  Last data filed at 3/16/2022 0602  Gross per 24 hour   Intake --   Output 900 ml   Net -900 ml       Body mass index is 33 05 kg/m²        Physical Exam   Gen: No acute distress, well-nourished, well-appearing  HEENT:  Normocephalic/Atraumatic, EOMI, cervical collar in place  Extremities: No edema  Pulmonary: Non-labored breathing  GI: Soft, non-tender, non-distended  BS+  MSK: ROM is WFL in all extremities, no appreciated spasticity/tone  Integumentary: Skin is warm, dry  No rashes or ulcers  Neuro: AAOx3, Speech is fluent  Patient is following commands  Motor:   RUE:  5/5 throughout   LUE:  5/5 throughout   RLE:  5/5 throughout   LLE:  5/5 throughout   Psych: Normal mood and affect  Social History:    Social History     Socioeconomic History    Marital status: /Civil Union     Spouse name: Jc Cancino Number of children: 4    Years of education: None    Highest education level: None   Occupational History    Occupation: Retired   Tobacco Use    Smoking status: Never Smoker    Smokeless tobacco: Never Used   Vaping Use    Vaping Use: Never used   Substance and Sexual Activity    Alcohol use: Not Currently    Drug use: No    Sexual activity: None   Other Topics Concern    None   Social History Narrative    Caffeine use: 2 cups coffee a day     Social Determinants of Health     Financial Resource Strain: Not on file   Food Insecurity: No Food Insecurity    Worried About Running Out of Food in the Last Year: Never true    Kacy of Food in the Last Year: Never true   Transportation Needs: No Transportation Needs    Lack of Transportation (Medical): No    Lack of Transportation (Non-Medical):  No   Physical Activity: Not on file   Stress: Not on file   Social Connections: Not on file   Intimate Partner Violence: Not on file   Housing Stability: Unknown    Unable to Pay for Housing in the Last Year: No    Number of Places Lived in the Last Year: Not on file    Unstable Housing in the Last Year: No        Family History:    Family History   Problem Relation Age of Onset    Lung cancer Mother     No Known Problems Father     Heart attack Brother     Atrial fibrillation Brother     Emphysema Sister          Medications:     Current Facility-Administered Medications:     acetaminophen (TYLENOL) tablet 975 mg, 975 mg, Oral, Q8H [facility], Dannial Pinna, PA-C, 975 mg at 03/16/22 0510    amLODIPine (NORVASC) tablet 5 mg, 5 mg, Oral, BID, Cedric Moy MD, 5 mg at 03/16/22 0800    atorvastatin (LIPITOR) tablet 40 mg, 40 mg, Oral, QPM, Dannial Pinna, PA-C, 40 mg at 03/15/22 1732    calcium carbonate (TUMS) chewable tablet 1,000 mg, 1,000 mg, Oral, Daily PRN, Dannial Pinna, PA-C    cyclobenzaprine (FLEXERIL) tablet 5 mg, 5 mg, Oral, TID PRN, Robe Sheets MD    [COMPLETED] dexamethasone (DECADRON) tablet 4 mg, 4 mg, Oral, Q8H [facility], 4 mg at 03/13/22 1420 **FOLLOWED BY** [COMPLETED] dexamethasone (DECADRON) tablet 2 mg, 2 mg, Oral, Q8H [facility], 2 mg at 03/15/22 1437 **FOLLOWED BY** dexamethasone (DECADRON) tablet 2 mg, 2 mg, Oral, Q12H [facility], 2 mg at 03/16/22 0800 **FOLLOWED BY** [START ON 3/18/2022] dexamethasone (DECADRON) tablet 2 mg, 2 mg, Oral, Daily, Dannial Pinna, PA-C    docusate sodium (COLACE) capsule 100 mg, 100 mg, Oral, BID, Dannial Pinna, PA-C, 100 mg at 03/16/22 0800    DULoxetine (CYMBALTA) delayed release capsule 60 mg, 60 mg, Oral, HS, Dannial Pinna, PA-C, 60 mg at 03/15/22 2150    flecainide (TAMBOCOR) tablet 100 mg, 100 mg, Oral, BID, Dannial Pinna, PA-C, 100 mg at 03/16/22 0802    heparin (porcine) 25,000 Units in sodium chloride 0 45 % 250 mL infusion, 3-30 Units/kg/hr (Order-Specific), Intravenous, Titrated, Robe Sheets MD, Last Rate: 12 1 mL/hr at 03/16/22 0758, 11 Units/kg/hr at 03/16/22 0758    heparin (porcine) injection 4,400 Units, 4,400 Units, Intravenous, Q1H PRN, Robe Sheets MD    heparin (porcine) injection 8,800 Units, 8,800 Units, Intravenous, Q1H PRN, Robe Sheets MD    hydrALAZINE (APRESOLINE) injection 10 mg, 10 mg, Intravenous, Q8H PRN, Consuelo Cox MD, 10 mg at 03/12/22 1625    hydrALAZINE (APRESOLINE) tablet 50 mg, 50 mg, Oral, Q8H [facility], Consuelo Cox MD, 50 mg at 03/16/22 0511    HYDROmorphone HCl (DILAUDID) injection 0 2 mg, 0 2 mg, Intravenous, Q1H PRN, Endy Middletown, PA-C    insulin lispro (HumaLOG) 100 units/mL subcutaneous injection 1-5 Units, 1-5 Units, Subcutaneous, HS, Endy [place], PA-C, 1 Units at 03/11/22 2206    insulin lispro (HumaLOG) 100 units/mL subcutaneous injection 2-12 Units, 2-12 Units, Subcutaneous, 4 times day, 2 Units at 03/15/22 1804 **AND** Fingerstick Glucose (POCT), , , 4 times day, Endy Middletown, PA-C    methocarbamol (ROBAXIN) tablet 500 mg, 500 mg, Oral, Q6H [facility], Endy Midd[place], PA-C, 500 mg at 03/16/22 0510    metoprolol succinate (TOPROL-XL) 24 hr tablet 100 mg, 100 mg, Oral, Daily, Endy [place], PA-C, 100 mg at 03/16/22 0800    ondansetron (ZOFRAN) injection 4 mg, 4 mg, Intravenous, Q6H PRN, Endy Middletown, PA-C    oxyCODONE (ROXICODONE) IR tablet 2 5 mg, 2 5 mg, Oral, Q4H PRN, Endy Middletown, PA-C    oxyCODONE (ROXICODONE) IR tablet 5 mg, 5 mg, Oral, Q4H PRN, Endy Midd[place], PA-C, 5 mg at 03/13/22 0345    pantoprazole (PROTONIX) EC tablet 40 mg, 40 mg, Oral, Early Morning, Endy [place], PA-C, 40 mg at 03/16/22 1898    senna (SENOKOT) tablet 8 6 mg, 1 tablet, Oral, Daily, Endy [place], PA-C, 8 6 mg at 03/15/22 1036    Past Medical History:     Past Medical History:   Diagnosis Date    Acute venous embolism and thrombosis of deep vessels of proximal lower extremity (HCC)     Last assessed: 5/18/15    Arthritis     Cellulitis     LE    CPAP (continuous positive airway pressure) dependence     Factor V Leiden ([facility] [address] )     Forgetfulness     [place] (hard of hearing)     Paroxysmal atrial fibrillation ([facility] [address] )     Last assessed: 11/2/15    Sleep apnea         Past Surgical History:     Past Surgical History:   Procedure Laterality Date    BACK SURGERY      Lower    COLON SURGERY      COLONOSCOPY      MA ARTHRODESIS ANT INTERBODY MIN DISCECTOMY, CERVICAL BELOW C2 N/A 3/11/2022    Procedure: Anterior cervical discectomy and fixation fusion C5/6 and C6/7; Posterior cervical decompression and instrumented fusion C3-T1; Surgeon: Yasmani Feliz MD;  Location: [facility];  Service: Neurosurgery         Allergies: Allergies   Allergen Reactions    Morphine GI Intolerance    Vitamin K Other (See Comments)     On coumadin-patient sensitive to vitamin K amounts in meds etc            LABORATORY RESULTS:      Lab Results   Component Value Date    HGB 13 1 03/16/2022    HGB 12 9 01/23/2015    HCT 37 8 03/16/2022    HCT 39 5 01/23/2015    WBC 10 21 (H) 03/16/2022    WBC 8 43 01/23/2015     Lab Results   Component Value Date    BUN 16 03/16/2022    BUN 17 11/02/2021     10/25/2017    K 3 8 03/16/2022    K 4 5 11/02/2021     03/16/2022    CL 99 11/02/2021    GLUCOSE 102 (H) 10/25/2017    CREATININE 0 68 03/16/2022    CREATININE 1 24 10/25/2017     Lab Results   Component Value Date    PROTIME 14 7 (H) 03/16/2022    PROTIME 22 2 (H) 01/11/2018    INR 1 20 (H) 03/16/2022    INR 2 2 (H) 01/11/2018        DIAGNOSTIC STUDIES: Reviewed  MRI brain wo contrast    Result Date: 3/5/2022  Impression: No acute intracranial pathology  Mild chronic microangiopathy  Workstation performed: [id number]     MRI cervical spine w wo contrast    Result Date: 3/5/2022  Impression: No acute abnormality  Motion limited    Multilevel degenerative spondylosis progressed from 2013 most severe at C5-6 with severe canal stenosis and flattening of the cord  Small focus of T2 signal within the cord at C5-C6 may represent myelomalacia  Moderate canal stenosis at C3-4 mildly indents the cord without signal abnormality  Marked multilevel foraminal stenosis  No prevertebral edema or mass   Workstation performed: [id number]

## 2022-03-16 NOTE — ASSESSMENT & PLAN NOTE
· On Coumadin at home--> 5mg W/Sa, 2 5mg all other days (17 5mg/week)  · Continue heart rate control medications  · POD 5  · Daily INR while bridging to warfarin with heparin gtt (goal 2-3)--> started 3/14

## 2022-03-16 NOTE — PLAN OF CARE
Problem: MOBILITY - ADULT  Goal: Maintain or return to baseline ADL function  Description: INTERVENTIONS:  -  Assess patient's ability to carry out ADLs; assess patient's baseline for ADL function and identify physical deficits which impact ability to perform ADLs (bathing, care of mouth/teeth, toileting, grooming, dressing, etc )  - Assess/evaluate cause of self-care deficits   - Assess range of motion  - Assess patient's mobility; develop plan if impaired  - Assess patient's need for assistive devices and provide as appropriate  - Encourage maximum independence but intervene and supervise when necessary  - Involve family in performance of ADLs  - Assess for home care needs following discharge   - Consider OT consult to assist with ADL evaluation and planning for discharge  - Provide patient education as appropriate  Outcome: Progressing  Goal: Maintains/Returns to pre admission functional level  Description: INTERVENTIONS:  - Perform BMAT or MOVE assessment daily    - Set and communicate daily mobility goal to care team and patient/family/caregiver     - Collaborate with rehabilitation services on mobility goals if consulted  - Dangle patient 3 times a day  - Stand patient 3 times a day  - Ambulate patient 3 times a day  - Out of bed to chair 3 times a day   - Out of bed for meals 3 times a day  - Out of bed for toileting  - Record patient progress and toleration of activity level   Outcome: Progressing     Problem: PAIN - ADULT  Goal: Verbalizes/displays adequate comfort level or baseline comfort level  Description: Interventions:  - Encourage patient to monitor pain and request assistance  - Assess pain using appropriate pain scale  - Administer analgesics based on type and severity of pain and evaluate response  - Implement non-pharmacological measures as appropriate and evaluate response  - Notify physician/advanced practitioner if interventions unsuccessful or patient reports new pain  Outcome: Progressing Problem: INFECTION - ADULT  Goal: Absence or prevention of progression during hospitalization  Description: INTERVENTIONS:  - Assess and monitor for signs and symptoms of infection  - Monitor lab/diagnostic results  - Monitor all insertion sites, i e  indwelling lines, tubes, and drains  - [place] appropriate cooling/warming therapies per order  - Administer medications as ordered  - Instruct and encourage patient and family to use good hand hygiene technique  - Identify and instruct in appropriate isolation precautions for identified infection/condition  Outcome: Progressing     Problem: SAFETY ADULT  Goal: Maintain or return to baseline ADL function  Description: INTERVENTIONS:  -  Assess patient's ability to carry out ADLs; assess patient's baseline for ADL function and identify physical deficits which impact ability to perform ADLs (bathing, care of mouth/teeth, toileting, grooming, dressing, etc )  - Assess/evaluate cause of self-care deficits   - Assess range of motion  - Assess patient's mobility; develop plan if impaired  - Assess patient's need for assistive devices and provide as appropriate  - Encourage maximum independence but intervene and supervise when necessary  - Involve family in performance of ADLs  - Assess for home care needs following discharge   - Consider OT consult to assist with ADL evaluation and planning for discharge  - Provide patient education as appropriate  Outcome: Progressing  Goal: Maintains/Returns to pre admission functional level  Description: INTERVENTIONS:  - Perform BMAT or MOVE assessment daily    - Set and communicate daily mobility goal to care team and patient/family/caregiver     - Collaborate with rehabilitation services on mobility goals if consulted  - Dangle patient 3 times a day  - Stand patient 3 times a day  - Ambulate patient 3 times a day  - Out of bed to chair 3 times a day   - Out of bed for meals 3 times a day  - Out of bed for toileting  - Record patient progress and toleration of activity level   Outcome: Progressing  Goal: Patient will remain free of falls  Description: INTERVENTIONS:  - Educate patient/family on patient safety including physical limitations  - Instruct patient to call for assistance with activity   - Consult OT/PT to assist with strengthening/mobility   - Keep Call bell within reach  - Keep bed low and locked with side rails adjusted as appropriate  - Keep care items and personal belongings within reach  - Initiate and maintain comfort rounds  - Make Fall Risk Sign visible to staff  - Apply yellow socks and bracelet for high fall risk patients  - Consider moving patient to room near nurses station  Outcome: Progressing     Problem: DISCHARGE PLANNING  Goal: Discharge to home or other facility with appropriate resources  Description: INTERVENTIONS:  - Identify barriers to discharge w/patient and caregiver  - Arrange for needed discharge resources and transportation as appropriate  - Identify discharge learning needs (meds, wound care, etc )  - Refer to Case Management Department for coordinating discharge planning if the patient needs post-hospital services based on physician/advanced practitioner order or complex needs related to functional status, cognitive ability, or social support system  Outcome: Progressing     Problem: Knowledge Deficit  Goal: Patient/family/caregiver demonstrates understanding of disease process, treatment plan, medications, and discharge instructions  Description: Complete learning assessment and assess knowledge base  Interventions:  - Provide teaching at level of understanding  - Provide teaching via preferred learning methods  Outcome: Progressing     Problem: Neurological Deficit  Goal: Neurological status is stable or improving  Description: Interventions:  - Monitor and assess patient's level of consciousness, motor function, sensory function, and level of assistance needed for ADLs     - Monitor and report changes from baseline  Collaborate with interdisciplinary team to initiate plan and implement interventions as ordered  - Provide and maintain a safe environment  - Consider seizure precautions  - Consider fall precautions  - Consider aspiration precautions  - Consider bleeding precautions  Outcome: Progressing     Problem: Activity Intolerance/Impaired Mobility  Goal: Mobility/activity is maintained at optimum level for patient  Description: Interventions:  - Assess and monitor patient  barriers to mobility and need for assistive/adaptive devices  - Assess patient's emotional response to limitations  - Collaborate with interdisciplinary team and initiate plans and interventions as ordered  - Encourage independent activity per ability   - Maintain proper body alignment  - Perform active/passive rom as tolerated/ordered  - Plan activities to conserve energy   - Turn patient as appropriate  Outcome: Progressing     Problem: Communication Impairment  Goal: Ability to express needs and understand communication  Description: Assess patient's communication skills and ability to understand information  Patient will demonstrate use of effective communication techniques, alternative methods of communication and understanding even if not able to speak  - Encourage communication and provide alternate methods of communication as needed  - Collaborate with case management/ for discharge needs  - Include patient/family/caregiver in decisions related to communication  Outcome: Progressing     Problem: Potential for Aspiration  Goal: Non-ventilated patient's risk of aspiration is minimized  Description: Assess and monitor vital signs, respiratory status, and labs (WBC)  Monitor for signs of aspiration (tachypnea, cough, rales, wheezing, cyanosis, fever)  - Assess and monitor patient's ability to swallow  - Place patient up in chair to eat if possible    - HOB up at 90 degrees to eat if unable to get patient up into chair   - Supervise patient during oral intake  - Instruct patient/ family to take small bites  - Instruct patient/ family to take small single sips when taking liquids  - Follow patient-specific strategies generated by speech pathologist   Outcome: Progressing     Problem: Nutrition  Goal: Nutrition/Hydration status is improving  Description: Monitor and assess patient's nutrition/hydration status for malnutrition (ex- brittle hair, bruises, dry skin, pale skin and conjunctiva, muscle wasting, smooth red tongue, and disorientation)  Collaborate with interdisciplinary team and initiate plan and interventions as ordered  Monitor patient's weight and dietary intake as ordered or per policy  Utilize nutrition screening tool and intervene per policy  Determine patient's food preferences and provide high-protein, high-caloric foods as appropriate  - Assist patient with eating   - Allow adequate time for meals   - Encourage patient to take dietary supplement as ordered  - Collaborate with clinical nutritionist   - Include patient/family/caregiver in decisions related to nutrition  Outcome: Progressing     Problem: Nutrition/Hydration-ADULT  Goal: Nutrient/Hydration intake appropriate for improving, restoring or maintaining nutritional needs  Description: Monitor and assess patient's nutrition/hydration status for malnutrition  Collaborate with interdisciplinary team and initiate plan and interventions as ordered  Monitor patient's weight and dietary intake as ordered or per policy  Utilize nutrition screening tool and intervene as necessary  Determine patient's food preferences and provide high-protein, high-caloric foods as appropriate       INTERVENTIONS:  - Monitor oral intake, urinary output, labs, and treatment plans  - Assess nutrition and hydration status and recommend course of action  - Evaluate amount of meals eaten  - Assist patient with eating if necessary   - Allow adequate time for meals  - Recommend/ encourage appropriate diets, oral nutritional supplements, and vitamin/mineral supplements  - Order, calculate, and assess calorie counts as needed  - Assess need for intravenous fluids  - Provide specific nutrition/hydration education as appropriate  - Include patient/family/caregiver in decisions related to nutrition  Outcome: Progressing     Problem: Potential for Falls  Goal: Patient will remain free of falls  Description: INTERVENTIONS:  - Educate patient/family on patient safety including physical limitations  - Instruct patient to call for assistance with activity   - Consult OT/PT to assist with strengthening/mobility   - Keep Call bell within reach  - Keep bed low and locked with side rails adjusted as appropriate  - Keep care items and personal belongings within reach  - Initiate and maintain comfort rounds  - Make Fall Risk Sign visible to staff  - Apply yellow socks and bracelet for high fall risk patients  - Consider moving patient to room near nurses station  Outcome: Progressing     Problem: Prexisting or High Potential for Compromised Skin Integrity  Goal: Skin integrity is maintained or improved  Description: INTERVENTIONS:  - Identify patients at risk for skin breakdown  - Assess and monitor skin integrity  - Assess and monitor nutrition and hydration status  - Monitor labs   - Assess for incontinence   - Turn and reposition patient  - Assist with mobility/ambulation  - Relieve pressure over bony prominences  - Avoid friction and shearing  - Provide appropriate hygiene as needed including keeping skin clean and dry  - Evaluate need for skin moisturizer/barrier cream  - Collaborate with interdisciplinary team   - Patient/family teaching  - Consider wound care consult   Outcome: Progressing

## 2022-03-16 NOTE — ASSESSMENT & PLAN NOTE
· Sinus bradycardia on EKG  · Patient asymptomatic and stable  · Cardiology consulted and signed off

## 2022-03-16 NOTE — ASSESSMENT & PLAN NOTE
· Uncontrolled, likely due to steroid use; improved with addition of hydralazine and increased amlodipine--> after steroid taper, take off hydralazine because it is it is not first-line--> if still hypertensive after end of steroid taper, consider adding on ACEI/ARB  · Continue home blood pressure meds

## 2022-03-16 NOTE — ASSESSMENT & PLAN NOTE
· S/p Anterior cervical discectomy and fixation fusion C5/6 and C6/7; Posterior cervical decompression and instrumented fusion C3-T1, 3/11/2022  Plan  · Neurosurgery following  · On Decadron taper  · PT/OT  · PMR consult for possible ARC placement

## 2022-03-16 NOTE — PLAN OF CARE
Problem: Nutrition  Goal: Nutrition/Hydration status is improving  Description: Monitor and assess patient's nutrition/hydration status for malnutrition (ex- brittle hair, bruises, dry skin, pale skin and conjunctiva, muscle wasting, smooth red tongue, and disorientation)  Collaborate with interdisciplinary team and initiate plan and interventions as ordered  Monitor patient's weight and dietary intake as ordered or per policy  Utilize nutrition screening tool and intervene per policy  Determine patient's food preferences and provide high-protein, high-caloric foods as appropriate  - Assist patient with eating   - Allow adequate time for meals   - Encourage patient to take dietary supplement as ordered  - Collaborate with clinical nutritionist   - Include patient/family/caregiver in decisions related to nutrition  Outcome: Progressing     Problem: Nutrition/Hydration-ADULT  Goal: Nutrient/Hydration intake appropriate for improving, restoring or maintaining nutritional needs  Description: Monitor and assess patient's nutrition/hydration status for malnutrition  Collaborate with interdisciplinary team and initiate plan and interventions as ordered  Monitor patient's weight and dietary intake as ordered or per policy  Utilize nutrition screening tool and intervene as necessary  Determine patient's food preferences and provide high-protein, high-caloric foods as appropriate       INTERVENTIONS:  - Monitor oral intake, urinary output, labs, and treatment plans  - Assess nutrition and hydration status and recommend course of action  - Evaluate amount of meals eaten  - Assist patient with eating if necessary   - Allow adequate time for meals  - Recommend/ encourage appropriate diets, oral nutritional supplements, and vitamin/mineral supplements  - Order, calculate, and assess calorie counts as needed  - Assess need for intravenous fluids  - Provide specific nutrition/hydration education as appropriate  - Include patient/family/caregiver in decisions related to nutrition  Outcome: Progressing

## 2022-03-16 NOTE — PROGRESS NOTES
[address] [facility]  Progress Note - Krunal Bruce 1950, 71 y o  male MRN: [id number]  Unit/Bed#: [facility] 713-01 Encounter: [id number]  Primary Care Provider: Austin Jones MD   Date and time admitted to hospital: 3/5/2022  8:18 PM    * Cervical myelopathy [facility])  Assessment & Plan  · S/p Anterior cervical discectomy and fixation fusion C5/6 and C6/7; Posterior cervical decompression and instrumented fusion C3-T1, 3/11/2022  Plan  · Neurosurgery following  · On Decadron taper  · PT/OT  · PMR consult for possible ARC placement    Paroxysmal A-fib (HCC)  Assessment & Plan  · On Coumadin at home--> 5mg W/Sa, 2 5mg all other days (17 5mg/week)  · Continue heart rate control medications  · POD 5  · Daily INR while bridging to warfarin with heparin gtt (goal 2-3)--> started 3/14     Muscle spasm  Assessment & Plan  · Complaining of muscle spasm of left shoulder due to the collar  Plan  · Trial of flexiril    Leukocytosis  Assessment & Plan  · Afebrile and hemodynamically stable  · Likely due to steroid use  · Continue to monitor    Goals of care, counseling/discussion  Assessment & Plan  Discussed code status with patient and his spouse who was present on the phone during the time of the conversation  Patient continues to stay that he wishes to be DNR DNI  He understands that the DNI order will need to be rescinded prior to procedure if he requires intubation and reinstated after the procedure  He agrees and also does his wife  Sinus bradycardia  Assessment & Plan  · Sinus bradycardia on EKG  · Patient asymptomatic and stable  · Cardiology consulted and signed off    Mixed hyperlipidemia  Assessment & Plan  · Continue statin    Factor V Leiden mutation ([facility] [address] )  Assessment & Plan  · On Coumadin outpatient  · As above    WILL (obstructive sleep apnea)  Assessment & Plan  · Continue CPAP at night      Essential hypertension  Assessment & Plan  · Uncontrolled, likely due to steroid use; improved with addition of hydralazine and increased amlodipine--> after steroid taper, take off hydralazine because it is it is not first-line--> if still hypertensive after end of steroid taper, consider adding on ACEI/ARB  · Continue home blood pressure meds       VTE Pharmacologic Prophylaxis:   Pharmacologic: Heparin Drip, Warfarin  Mechanical VTE Prophylaxis in Place: Yes    Patient Centered Rounds: I have performed bedside rounds with nursing staff today  Discussions with Specialists or Other Care Team Provider: Neurosurgery, PMR    Education and Discussions with Family / Patient: Will attempt to call spouse today    Time Spent for Care: 30 minutes  More than 50% of total time spent on counseling and coordination of care as described above  Current Length of Stay: 11 day(s)    Current Patient Status: Inpatient   Certification Statement: The patient will continue to require additional inpatient hospital stay due to bridging of anticoagulation (heparin to warfarin), pending placement for rehab, Neurosurgery clearance    Discharge Plan: To be determined    Code Status: Level 3 - DNAR and DNI      Subjective:   He was seen and examined in his hospital chair  He states that he still feels great and pain is well controlled even without any opioids  This feeling stronger and his legs each day as well  We spoke a little bit about conversation I had with his wife the day before  Pleasant in disposition and manner  Objective:     Vitals:   Temp (24hrs), Av 1 °F (36 7 °C), Min:97 8 °F (36 6 °C), Max:98 4 °F (36 9 °C)    Temp:  [97 8 °F (36 6 °C)-98 4 °F (36 9 °C)] 97 8 °F (36 6 °C)  HR:  [50-56] 56  Resp:  [16] 16  BP: (128-148)/(66-75) 132/66  SpO2:  [95 %-97 %] 95 %  Body mass index is 33 05 kg/m²  Input and Output Summary (last 24 hours):        Intake/Output Summary (Last 24 hours) at 3/16/2022 1205  Last data filed at 3/16/2022 0602  Gross per 24 hour   Intake --   Output 900 ml   Net -900 ml Physical Exam:     Physical Exam  Vitals reviewed  Constitutional:       General: He is not in acute distress  Appearance: He is well-developed  He is not diaphoretic  HENT:      Head: Normocephalic and atraumatic  Eyes:      General: No scleral icterus  Conjunctiva/sclera: Conjunctivae normal    Neck:      Thyroid: No thyromegaly  Trachea: No tracheal deviation  Comments: Cervical brace in place  Cardiovascular:      Rate and Rhythm: Normal rate and regular rhythm  Heart sounds: Murmur heard  Pulmonary:      Effort: Pulmonary effort is normal       Breath sounds: Normal breath sounds  Abdominal:      General: Bowel sounds are normal  There is no distension  Palpations: Abdomen is soft  Tenderness: There is no abdominal tenderness  Musculoskeletal:         General: No tenderness  Cervical back: Neck supple  Right lower leg: No edema  Left lower leg: No edema  Lymphadenopathy:      Cervical: No cervical adenopathy  Skin:     General: Skin is warm  Coloration: Skin is not pale  Findings: No erythema  Neurological:      Mental Status: He is alert and oriented to person, place, and time  Psychiatric:         Mood and Affect: Mood normal          Behavior: Behavior normal          Thought Content:  Thought content normal          Judgment: Judgment normal          Additional Data:     Labs:    Results from last 7 days   Lab Units 03/16/22  0601   WBC Thousand/uL 10 21*   HEMOGLOBIN g/dL 13 1   HEMATOCRIT % 37 8   PLATELETS Thousands/uL 177   NEUTROS PCT % 82*   LYMPHS PCT % 9*   MONOS PCT % 8   EOS PCT % 0     Results from last 7 days   Lab Units 03/16/22  0601   SODIUM mmol/L 134*   POTASSIUM mmol/L 3 8   CHLORIDE mmol/L 101   CO2 mmol/L 27   BUN mg/dL 16   CREATININE mg/dL 0 68   ANION GAP mmol/L 6   CALCIUM mg/dL 9 0   GLUCOSE RANDOM mg/dL 131     Results from last 7 days   Lab Units 03/16/22  0601   INR  1 20*     Results from last 7 days   Lab Units 03/16/22  1037 03/16/22  0557 03/15/22  2114 03/15/22  1801 03/15/22  1436 03/15/22  1107 03/15/22  1035 03/15/22  0623 03/14/22  2101 03/14/22  1607 03/14/22  1055 03/14/22  0645   POC GLUCOSE mg/dl 114 127 137 171* 94 139 154* 129 123 118 109 144*                   * I Have Reviewed All Lab Data Listed Above  * Additional Pertinent Lab Tests Reviewed:  All Labs Within Last 24 Hours Reviewed    Imaging:    Imaging Reports Reviewed Today Include:   Imaging Personally Reviewed by Myself Includes:      Recent Cultures (last 7 days):           Last 24 Hours Medication List:   Current Facility-Administered Medications   Medication Dose Route Frequency Provider Last Rate    acetaminophen  975 mg Oral [facility] Amy Abu, PA-C      amLODIPine  5 mg Oral BID Evan Snell MD      atorvastatin  40 mg Oral QPM Amy Abu, PA-C      calcium carbonate  1,000 mg Oral Daily PRN Amy Abu, PA-C      cyclobenzaprine  5 mg Oral TID PRN Stanley Brian MD      dexamethasone  2 mg Oral Q12H [address] Inell Button, PAKrissyC      Followed by   Neeraj Dodd ON 3/18/2022] dexamethasone  2 mg Oral Daily Amy Abu, PA-C      docusate sodium  100 mg Oral BID Amy Abu, PA-C      DULoxetine  60 mg Oral HS Amy Abu, PA-C      flecainide  100 mg Oral BID Amy Abu, PA-C      heparin (porcine)  3-30 Units/kg/hr (Order-Specific) Intravenous Titrated Stanley Brian MD 11 Units/kg/hr (03/16/22 0758)    heparin (porcine)  4,400 Units Intravenous Q1H PRN Stanley Brian MD      heparin (porcine)  8,800 Units Intravenous Q1H PRN Stanley Brian MD      hydrALAZINE  10 mg Intravenous Q8H PRN Stanley Brian MD      hydrALAZINE  50 mg Oral [facility] Stanley Brian MD      HYDROmorphone  0 2 mg Intravenous Q1H PRN Amy Abu, PA-C      insulin lispro  1-5 Units Subcutaneous HS Amy Abu, PA-C      insulin lispro  2-12 Units Subcutaneous 4 times day Endy [place], PA-C      methocarbamol  500 mg Oral Q6H [address] Endy [place], PA-C      metoprolol succinate  100 mg Oral Daily Endy [place], PA-C      ondansetron  4 mg Intravenous Q6H PRN Endy [place], PA-C      oxyCODONE  2 5 mg Oral Q4H PRN Endy [place], PA-C      oxyCODONE  5 mg Oral Q4H PRN Endy [place], PA-C      pantoprazole  40 mg Oral Early Morning Endy [place], PA-C      senna  1 tablet Oral Daily Endy [place], PA-C      warfarin  5 mg Oral Once (warfarin) Sue Banks DO          Today, Patient Was Seen By: Sue Banks DO    ** Please Note: Dictation voice to text software may have been used in the creation of this document   **

## 2022-03-17 LAB
ANION GAP SERPL CALCULATED.3IONS-SCNC: 7 MMOL/L (ref 4–13)
APTT PPP: 125 SECONDS (ref 23–37)
APTT PPP: 48 SECONDS (ref 23–37)
BASOPHILS # BLD AUTO: 0.01 THOUSANDS/ΜL (ref 0–0.1)
BASOPHILS NFR BLD AUTO: 0 % (ref 0–1)
BUN SERPL-MCNC: 13 MG/DL (ref 5–25)
CALCIUM SERPL-MCNC: 9.2 MG/DL (ref 8.3–10.1)
CHLORIDE SERPL-SCNC: 99 MMOL/L (ref 100–108)
CO2 SERPL-SCNC: 28 MMOL/L (ref 21–32)
CREAT SERPL-MCNC: 0.7 MG/DL (ref 0.6–1.3)
EOSINOPHIL # BLD AUTO: 0.02 THOUSAND/ΜL (ref 0–0.61)
EOSINOPHIL NFR BLD AUTO: 0 % (ref 0–6)
ERYTHROCYTE [DISTWIDTH] IN BLOOD BY AUTOMATED COUNT: 13.3 % (ref 11.6–15.1)
GFR SERPL CREATININE-BSD FRML MDRD: 94 ML/MIN/1.73SQ M
GLUCOSE SERPL-MCNC: 108 MG/DL (ref 65–140)
GLUCOSE SERPL-MCNC: 126 MG/DL (ref 65–140)
GLUCOSE SERPL-MCNC: 128 MG/DL (ref 65–140)
GLUCOSE SERPL-MCNC: 132 MG/DL (ref 65–140)
GLUCOSE SERPL-MCNC: 138 MG/DL (ref 65–140)
GLUCOSE SERPL-MCNC: 91 MG/DL (ref 65–140)
HCT VFR BLD AUTO: 39.8 % (ref 36.5–49.3)
HGB BLD-MCNC: 13.2 G/DL (ref 12–17)
IMM GRANULOCYTES # BLD AUTO: 0.07 THOUSAND/UL (ref 0–0.2)
IMM GRANULOCYTES NFR BLD AUTO: 1 % (ref 0–2)
INR PPP: 1.24 (ref 0.84–1.19)
LYMPHOCYTES # BLD AUTO: 1.07 THOUSANDS/ΜL (ref 0.6–4.47)
LYMPHOCYTES NFR BLD AUTO: 9 % (ref 14–44)
MCH RBC QN AUTO: 30.7 PG (ref 26.8–34.3)
MCHC RBC AUTO-ENTMCNC: 33.2 G/DL (ref 31.4–37.4)
MCV RBC AUTO: 93 FL (ref 82–98)
MONOCYTES # BLD AUTO: 0.77 THOUSAND/ΜL (ref 0.17–1.22)
MONOCYTES NFR BLD AUTO: 6 % (ref 4–12)
NEUTROPHILS # BLD AUTO: 10.02 THOUSANDS/ΜL (ref 1.85–7.62)
NEUTS SEG NFR BLD AUTO: 84 % (ref 43–75)
NRBC BLD AUTO-RTO: 0 /100 WBCS
PLATELET # BLD AUTO: 192 THOUSANDS/UL (ref 149–390)
PMV BLD AUTO: 10.4 FL (ref 8.9–12.7)
POTASSIUM SERPL-SCNC: 3.9 MMOL/L (ref 3.5–5.3)
PROTHROMBIN TIME: 15.1 SECONDS (ref 11.6–14.5)
RBC # BLD AUTO: 4.3 MILLION/UL (ref 3.88–5.62)
SODIUM SERPL-SCNC: 134 MMOL/L (ref 136–145)
WBC # BLD AUTO: 11.96 THOUSAND/UL (ref 4.31–10.16)

## 2022-03-17 PROCEDURE — 80048 BASIC METABOLIC PNL TOTAL CA: CPT | Performed by: STUDENT IN AN ORGANIZED HEALTH CARE EDUCATION/TRAINING PROGRAM

## 2022-03-17 PROCEDURE — 85730 THROMBOPLASTIN TIME PARTIAL: CPT | Performed by: INTERNAL MEDICINE

## 2022-03-17 PROCEDURE — 99232 SBSQ HOSP IP/OBS MODERATE 35: CPT | Performed by: INTERNAL MEDICINE

## 2022-03-17 PROCEDURE — 85610 PROTHROMBIN TIME: CPT | Performed by: STUDENT IN AN ORGANIZED HEALTH CARE EDUCATION/TRAINING PROGRAM

## 2022-03-17 PROCEDURE — 85025 COMPLETE CBC W/AUTO DIFF WBC: CPT | Performed by: STUDENT IN AN ORGANIZED HEALTH CARE EDUCATION/TRAINING PROGRAM

## 2022-03-17 PROCEDURE — 82948 REAGENT STRIP/BLOOD GLUCOSE: CPT

## 2022-03-17 RX ORDER — WARFARIN SODIUM 5 MG/1
5 TABLET ORAL
Status: COMPLETED | OUTPATIENT
Start: 2022-03-17 | End: 2022-03-17

## 2022-03-17 RX ADMIN — DOCUSATE SODIUM 100 MG: 100 CAPSULE ORAL at 17:22

## 2022-03-17 RX ADMIN — ATORVASTATIN CALCIUM 40 MG: 40 TABLET, FILM COATED ORAL at 17:22

## 2022-03-17 RX ADMIN — ACETAMINOPHEN 325MG 975 MG: 325 TABLET ORAL at 13:01

## 2022-03-17 RX ADMIN — HEPARIN SODIUM 13 UNITS/KG/HR: 10000 INJECTION, SOLUTION INTRAVENOUS; SUBCUTANEOUS at 12:51

## 2022-03-17 RX ADMIN — ACETAMINOPHEN 325MG 975 MG: 325 TABLET ORAL at 22:31

## 2022-03-17 RX ADMIN — DEXAMETHASONE 2 MG: 2 TABLET ORAL at 09:15

## 2022-03-17 RX ADMIN — HYDRALAZINE HYDROCHLORIDE 50 MG: 50 TABLET ORAL at 05:33

## 2022-03-17 RX ADMIN — METHOCARBAMOL 500 MG: 500 TABLET, FILM COATED ORAL at 05:33

## 2022-03-17 RX ADMIN — METHOCARBAMOL 500 MG: 500 TABLET, FILM COATED ORAL at 00:14

## 2022-03-17 RX ADMIN — METHOCARBAMOL 500 MG: 500 TABLET, FILM COATED ORAL at 22:31

## 2022-03-17 RX ADMIN — AMLODIPINE BESYLATE 5 MG: 5 TABLET ORAL at 09:15

## 2022-03-17 RX ADMIN — ACETAMINOPHEN 325MG 975 MG: 325 TABLET ORAL at 05:33

## 2022-03-17 RX ADMIN — DOCUSATE SODIUM 100 MG: 100 CAPSULE ORAL at 09:15

## 2022-03-17 RX ADMIN — DULOXETINE HYDROCHLORIDE 60 MG: 60 CAPSULE, DELAYED RELEASE ORAL at 22:31

## 2022-03-17 RX ADMIN — FLECAINIDE ACETATE 100 MG: 100 TABLET ORAL at 17:22

## 2022-03-17 RX ADMIN — PANTOPRAZOLE SODIUM 40 MG: 40 TABLET, DELAYED RELEASE ORAL at 05:33

## 2022-03-17 RX ADMIN — METHOCARBAMOL 500 MG: 500 TABLET, FILM COATED ORAL at 17:22

## 2022-03-17 RX ADMIN — METOPROLOL SUCCINATE 100 MG: 100 TABLET, EXTENDED RELEASE ORAL at 09:15

## 2022-03-17 RX ADMIN — FLECAINIDE ACETATE 100 MG: 100 TABLET ORAL at 09:15

## 2022-03-17 RX ADMIN — HYDRALAZINE HYDROCHLORIDE 50 MG: 50 TABLET ORAL at 22:30

## 2022-03-17 RX ADMIN — HEPARIN SODIUM 4400 UNITS: 1000 INJECTION INTRAVENOUS; SUBCUTANEOUS at 07:17

## 2022-03-17 RX ADMIN — WARFARIN SODIUM 5 MG: 5 TABLET ORAL at 17:22

## 2022-03-17 RX ADMIN — STANDARDIZED SENNA CONCENTRATE 8.6 MG: 8.6 TABLET ORAL at 09:15

## 2022-03-17 RX ADMIN — METHOCARBAMOL 500 MG: 500 TABLET, FILM COATED ORAL at 12:50

## 2022-03-17 RX ADMIN — AMLODIPINE BESYLATE 5 MG: 5 TABLET ORAL at 22:30

## 2022-03-17 RX ADMIN — HYDRALAZINE HYDROCHLORIDE 50 MG: 50 TABLET ORAL at 13:00

## 2022-03-17 NOTE — PLAN OF CARE
Problem: MOBILITY - ADULT  Goal: Maintain or return to baseline ADL function  Description: INTERVENTIONS:  -  Assess patient's ability to carry out ADLs; assess patient's baseline for ADL function and identify physical deficits which impact ability to perform ADLs (bathing, care of mouth/teeth, toileting, grooming, dressing, etc )  - Assess/evaluate cause of self-care deficits   - Assess range of motion  - Assess patient's mobility; develop plan if impaired  - Assess patient's need for assistive devices and provide as appropriate  - Encourage maximum independence but intervene and supervise when necessary  - Involve family in performance of ADLs  - Assess for home care needs following discharge   - Consider OT consult to assist with ADL evaluation and planning for discharge  - Provide patient education as appropriate  Outcome: Progressing  Goal: Maintains/Returns to pre admission functional level  Description: INTERVENTIONS:  - Perform BMAT or MOVE assessment daily    - Set and communicate daily mobility goal to care team and patient/family/caregiver     - Collaborate with rehabilitation services on mobility goals if consulted  - Dangle patient 3 times a day  - Stand patient 3 times a day  - Ambulate patient 3 times a day  - Out of bed to chair 3 times a day   - Out of bed for meals 3 times a day  - Out of bed for toileting  - Record patient progress and toleration of activity level   Outcome: Progressing     Problem: PAIN - ADULT  Goal: Verbalizes/displays adequate comfort level or baseline comfort level  Description: Interventions:  - Encourage patient to monitor pain and request assistance  - Assess pain using appropriate pain scale  - Administer analgesics based on type and severity of pain and evaluate response  - Implement non-pharmacological measures as appropriate and evaluate response  - Notify physician/advanced practitioner if interventions unsuccessful or patient reports new pain  Outcome: Progressing Problem: INFECTION - ADULT  Goal: Absence or prevention of progression during hospitalization  Description: INTERVENTIONS:  - Assess and monitor for signs and symptoms of infection  - Monitor lab/diagnostic results  - Monitor all insertion sites, i e  indwelling lines, tubes, and drains  - [place] appropriate cooling/warming therapies per order  - Administer medications as ordered  - Instruct and encourage patient and family to use good hand hygiene technique  - Identify and instruct in appropriate isolation precautions for identified infection/condition  Outcome: Progressing     Problem: SAFETY ADULT  Goal: Maintain or return to baseline ADL function  Description: INTERVENTIONS:  -  Assess patient's ability to carry out ADLs; assess patient's baseline for ADL function and identify physical deficits which impact ability to perform ADLs (bathing, care of mouth/teeth, toileting, grooming, dressing, etc )  - Assess/evaluate cause of self-care deficits   - Assess range of motion  - Assess patient's mobility; develop plan if impaired  - Assess patient's need for assistive devices and provide as appropriate  - Encourage maximum independence but intervene and supervise when necessary  - Involve family in performance of ADLs  - Assess for home care needs following discharge   - Consider OT consult to assist with ADL evaluation and planning for discharge  - Provide patient education as appropriate  Outcome: Progressing  Goal: Maintains/Returns to pre admission functional level  Description: INTERVENTIONS:  - Perform BMAT or MOVE assessment daily    - Set and communicate daily mobility goal to care team and patient/family/caregiver     - Collaborate with rehabilitation services on mobility goals if consulted  - Dangle patient 3 times a day  - Stand patient 3 times a day  - Ambulate patient 3 times a day  - Out of bed to chair 3 times a day   - Out of bed for meals 3 times a day  - Out of bed for toileting  - Record patient progress and toleration of activity level   Outcome: Progressing  Goal: Patient will remain free of falls  Description: INTERVENTIONS:  - Educate patient/family on patient safety including physical limitations  - Instruct patient to call for assistance with activity   - Consult OT/PT to assist with strengthening/mobility   - Keep Call bell within reach  - Keep bed low and locked with side rails adjusted as appropriate  - Keep care items and personal belongings within reach  - Initiate and maintain comfort rounds  - Make Fall Risk Sign visible to staff  - Offer Toileting every 2 Hours, in advance of need  - Initiate/Maintain bed alarm  - Apply yellow socks and bracelet for high fall risk patients  - Consider moving patient to room near nurses station  Outcome: Progressing     Problem: DISCHARGE PLANNING  Goal: Discharge to home or other facility with appropriate resources  Description: INTERVENTIONS:  - Identify barriers to discharge w/patient and caregiver  - Arrange for needed discharge resources and transportation as appropriate  - Identify discharge learning needs (meds, wound care, etc )  - Refer to Case Management Department for coordinating discharge planning if the patient needs post-hospital services based on physician/advanced practitioner order or complex needs related to functional status, cognitive ability, or social support system  Outcome: Progressing     Problem: Knowledge Deficit  Goal: Patient/family/caregiver demonstrates understanding of disease process, treatment plan, medications, and discharge instructions  Description: Complete learning assessment and assess knowledge base    Interventions:  - Provide teaching at level of understanding  - Provide teaching via preferred learning methods  Outcome: Progressing     Problem: Neurological Deficit  Goal: Neurological status is stable or improving  Description: Interventions:  - Monitor and assess patient's level of consciousness, motor function, sensory function, and level of assistance needed for ADLs  - Monitor and report changes from baseline  Collaborate with interdisciplinary team to initiate plan and implement interventions as ordered  - Provide and maintain a safe environment  - Consider seizure precautions  - Consider fall precautions  - Consider aspiration precautions  - Consider bleeding precautions  Outcome: Progressing     Problem: Activity Intolerance/Impaired Mobility  Goal: Mobility/activity is maintained at optimum level for patient  Description: Interventions:  - Assess and monitor patient  barriers to mobility and need for assistive/adaptive devices  - Assess patient's emotional response to limitations  - Collaborate with interdisciplinary team and initiate plans and interventions as ordered  - Encourage independent activity per ability   - Maintain proper body alignment  - Perform active/passive rom as tolerated/ordered  - Plan activities to conserve energy   - Turn patient as appropriate  Outcome: Progressing     Problem: Communication Impairment  Goal: Ability to express needs and understand communication  Description: Assess patient's communication skills and ability to understand information  Patient will demonstrate use of effective communication techniques, alternative methods of communication and understanding even if not able to speak  - Encourage communication and provide alternate methods of communication as needed  - Collaborate with case management/ for discharge needs  - Include patient/family/caregiver in decisions related to communication  Outcome: Progressing     Problem: Potential for Aspiration  Goal: Non-ventilated patient's risk of aspiration is minimized  Description: Assess and monitor vital signs, respiratory status, and labs (WBC)  Monitor for signs of aspiration (tachypnea, cough, rales, wheezing, cyanosis, fever)  - Assess and monitor patient's ability to swallow    - Place patient up in chair to eat if possible  - HOB up at 90 degrees to eat if unable to get patient up into chair   - Supervise patient during oral intake  - Instruct patient/ family to take small bites  - Instruct patient/ family to take small single sips when taking liquids  - Follow patient-specific strategies generated by speech pathologist   Outcome: Progressing     Problem: Nutrition  Goal: Nutrition/Hydration status is improving  Description: Monitor and assess patient's nutrition/hydration status for malnutrition (ex- brittle hair, bruises, dry skin, pale skin and conjunctiva, muscle wasting, smooth red tongue, and disorientation)  Collaborate with interdisciplinary team and initiate plan and interventions as ordered  Monitor patient's weight and dietary intake as ordered or per policy  Utilize nutrition screening tool and intervene per policy  Determine patient's food preferences and provide high-protein, high-caloric foods as appropriate  - Assist patient with eating   - Allow adequate time for meals   - Encourage patient to take dietary supplement as ordered  - Collaborate with clinical nutritionist   - Include patient/family/caregiver in decisions related to nutrition  Outcome: Progressing     Problem: Nutrition/Hydration-ADULT  Goal: Nutrient/Hydration intake appropriate for improving, restoring or maintaining nutritional needs  Description: Monitor and assess patient's nutrition/hydration status for malnutrition  Collaborate with interdisciplinary team and initiate plan and interventions as ordered  Monitor patient's weight and dietary intake as ordered or per policy  Utilize nutrition screening tool and intervene as necessary  Determine patient's food preferences and provide high-protein, high-caloric foods as appropriate       INTERVENTIONS:  - Monitor oral intake, urinary output, labs, and treatment plans  - Assess nutrition and hydration status and recommend course of action  - Evaluate amount of meals eaten  - Assist patient with eating if necessary   - Allow adequate time for meals  - Recommend/ encourage appropriate diets, oral nutritional supplements, and vitamin/mineral supplements  - Order, calculate, and assess calorie counts as needed  - Assess need for intravenous fluids  - Provide specific nutrition/hydration education as appropriate  - Include patient/family/caregiver in decisions related to nutrition  Outcome: Progressing     Problem: Potential for Falls  Goal: Patient will remain free of falls  Description: INTERVENTIONS:  - Educate patient/family on patient safety including physical limitations  - Instruct patient to call for assistance with activity   - Consult OT/PT to assist with strengthening/mobility   - Keep Call bell within reach  - Keep bed low and locked with side rails adjusted as appropriate  - Keep care items and personal belongings within reach  - Initiate and maintain comfort rounds  - Make Fall Risk Sign visible to staff  - Offer Toileting every 2 Hours, in advance of need  - Initiate/Maintain bed alarm  - Apply yellow socks and bracelet for high fall risk patients  - Consider moving patient to room near nurses station  Outcome: Progressing     Problem: Prexisting or High Potential for Compromised Skin Integrity  Goal: Skin integrity is maintained or improved  Description: INTERVENTIONS:  - Identify patients at risk for skin breakdown  - Assess and monitor skin integrity  - Assess and monitor nutrition and hydration status  - Monitor labs   - Assess for incontinence   - Turn and reposition patient  - Assist with mobility/ambulation  - Relieve pressure over bony prominences  - Avoid friction and shearing  - Provide appropriate hygiene as needed including keeping skin clean and dry  - Evaluate need for skin moisturizer/barrier cream  - Collaborate with interdisciplinary team   - Patient/family teaching  - Consider wound care consult   Outcome: Progressing

## 2022-03-17 NOTE — PROGRESS NOTES
[address] [facility]  Progress Note - Kev Bring 1950, 71 y o  male MRN: [id number]  Unit/Bed#: [facility] 713-01 Encounter: [id number]  Primary Care Provider: Chrystal Frankel, MD   Date and time admitted to hospital: 3/5/2022  8:18 PM    * Cervical myelopathy [facility])  Assessment & Plan  · S/p Anterior cervical discectomy and fixation fusion C5/6 and C6/7; Posterior cervical decompression and instrumented fusion C3-T1, 3/11/2022  Plan  · Neurosurgery following  · On Decadron taper  · PT/OT  · PMR consult for possible ARC placement    Paroxysmal A-fib (HCC)  Assessment & Plan  · On Coumadin at home--> 5mg W/Sa, 2 5mg all other days (17 5mg/week)  · Continue heart rate control medications  · POD 6  · Daily INR while bridging to warfarin with heparin gtt (goal 2-3)--> started 3/14     Muscle spasm  Assessment & Plan  · Complaining of muscle spasm of left shoulder due to the collar  Plan  · Trial of flexiril    Leukocytosis  Assessment & Plan  · Afebrile and hemodynamically stable  · Likely due to steroid use  · Continue to monitor    Goals of care, counseling/discussion  Assessment & Plan  Discussed code status with patient and his spouse who was present on the phone during the time of the conversation  Patient continues to stay that he wishes to be DNR DNI  He understands that the DNI order will need to be rescinded prior to procedure if he requires intubation and reinstated after the procedure  He agrees and also does his wife  Sinus bradycardia  Assessment & Plan  · Sinus bradycardia on EKG  · Patient asymptomatic and stable  · Cardiology consulted and signed off    Mixed hyperlipidemia  Assessment & Plan  · Continue statin    Factor V Leiden mutation ([facility] [address] )  Assessment & Plan  · On Coumadin outpatient  · As above    WILL (obstructive sleep apnea)  Assessment & Plan  · Continue CPAP at night      Essential hypertension  Assessment & Plan  · Uncontrolled, likely due to steroid use; improved with addition of hydralazine and increased amlodipine--> after steroid taper, take off hydralazine because it is it is not first-line--> if still hypertensive after end of steroid taper, consider adding on ACEI/ARB  · Continue home blood pressure meds       VTE Pharmacologic Prophylaxis:   Pharmacologic: Heparin Drip, Warfarin  Mechanical VTE Prophylaxis in Place: Yes    Patient Centered Rounds: I have performed bedside rounds with nursing staff today  Discussions with Specialists or Other Care Team Provider: Neurosurgery    Education and Discussions with Family / Patient: Will attempt to call spouse today    Time Spent for Care: 30 minutes  More than 50% of total time spent on counseling and coordination of care as described above  Current Length of Stay: 12 day(s)    Current Patient Status: Inpatient   Certification Statement: The patient will continue to require additional inpatient hospital stay due to [facility] stay and heparin to warfarin bridging    Discharge Plan: Pending ARC placement insurance auth    Code Status: Level 3 - DNAR and DNI      Subjective:   Patient seen and examined while he was working with PT in the hallways  He states that he is feeling good and strong, but just a little stiff in his neck  Otherwise denies any discomfort or pain  No other symptoms  Normal appetite, bowel and bladder habits  Objective:     Vitals:   Temp (24hrs), Av 8 °F (36 6 °C), Min:97 8 °F (36 6 °C), Max:97 9 °F (36 6 °C)    Temp:  [97 8 °F (36 6 °C)-97 9 °F (36 6 °C)] 97 8 °F (36 6 °C)  HR:  [49-59] 59  Resp:  [21] 21  BP: (123-149)/(68-77) 135/69  SpO2:  [91 %-97 %] 91 %  Body mass index is 32 9 kg/m²  Input and Output Summary (last 24 hours): Intake/Output Summary (Last 24 hours) at 3/17/2022 1037  Last data filed at 3/16/2022 1700  Gross per 24 hour   Intake 360 ml   Output 150 ml   Net 210 ml       Physical Exam:     Physical Exam  Vitals reviewed     Constitutional:       General: He is not in acute distress  Appearance: He is well-developed  He is not diaphoretic  HENT:      Head: Normocephalic and atraumatic  Right Ear: External ear normal       Left Ear: External ear normal       Nose: Nose normal       Mouth/Throat:      Mouth: Mucous membranes are moist       Pharynx: Oropharynx is clear  Eyes:      General: No scleral icterus  Conjunctiva/sclera: Conjunctivae normal    Neck:      Thyroid: No thyromegaly  Trachea: No tracheal deviation  Cardiovascular:      Heart sounds: Normal heart sounds  Pulmonary:      Effort: Pulmonary effort is normal    Musculoskeletal:         General: No tenderness  Cervical back: Neck supple  Right lower leg: No edema  Left lower leg: No edema  Lymphadenopathy:      Cervical: No cervical adenopathy  Skin:     Coloration: Skin is not pale  Findings: No bruising, erythema, lesion or rash  Neurological:      Mental Status: He is alert and oriented to person, place, and time  Comments: Observed the patient walking with PT with a walker; slightly stiff get but otherwise normal ambulation   Psychiatric:         Mood and Affect: Mood normal          Behavior: Behavior normal          Thought Content:  Thought content normal          Judgment: Judgment normal        Additional Data:     Labs:    Results from last 7 days   Lab Units 03/17/22  0539   WBC Thousand/uL 11 96*   HEMOGLOBIN g/dL 13 2   HEMATOCRIT % 39 8   PLATELETS Thousands/uL 192   NEUTROS PCT % 84*   LYMPHS PCT % 9*   MONOS PCT % 6   EOS PCT % 0     Results from last 7 days   Lab Units 03/17/22  0539   SODIUM mmol/L 134*   POTASSIUM mmol/L 3 9   CHLORIDE mmol/L 99*   CO2 mmol/L 28   BUN mg/dL 13   CREATININE mg/dL 0 70   ANION GAP mmol/L 7   CALCIUM mg/dL 9 2   GLUCOSE RANDOM mg/dL 128     Results from last 7 days   Lab Units 03/17/22  0539   INR  1 24*     Results from last 7 days   Lab Units 03/17/22  0912 03/17/22  0545 03/16/22  2115 03/16/22  1531 03/16/22  1401 03/16/22  1037 03/16/22  0557 03/15/22  2114 03/15/22  1801 03/15/22  1436 03/15/22  1107 03/15/22  1035   POC GLUCOSE mg/dl 108 126 112 123 143* 114 127 137 171* 94 139 154*                   * I Have Reviewed All Lab Data Listed Above  * Additional Pertinent Lab Tests Reviewed:  All Labs Within Last 24 Hours Reviewed    Imaging:    Imaging Reports Reviewed Today Include:   Imaging Personally Reviewed by Myself Includes:      Recent Cultures (last 7 days):           Last 24 Hours Medication List:   Current Facility-Administered Medications   Medication Dose Route Frequency Provider Last Rate    acetaminophen  975 mg Oral [facility] Myron Serna PA-C      amLODIPine  5 mg Oral BID Carolina Dover MD      atorvastatin  40 mg Oral QPM [facility]      calcium carbonate  1,000 mg Oral Daily PRN Myrondwain Serna PA-C      cyclobenzaprine  5 mg Oral TID PRN Watson Gutierrez MD      [START ON 3/18/2022] dexamethasone  2 mg Oral Daily MyronVEENA Gomez-ALTAGRACIA      Diclofenac Sodium  2 g Topical 4x Daily Etienne Wiley DO      docusate sodium  100 mg Oral BID [facility], PA-C      DULoxetine  60 mg Oral HS [facility]      flecainide  100 mg Oral BID [place] VEENA Serna-ALTAGRACIA      heparin (porcine)  3-30 Units/kg/hr (Order-Specific) Intravenous Titrated Watson Gutierrez MD 13 Units/kg/hr (03/17/22 0720)    heparin (porcine)  4,400 Units Intravenous Q1H PRN Watson Gutierrez MD      heparin (porcine)  8,800 Units Intravenous Q1H PRN Watson Gutierrez MD      hydrALAZINE  10 mg Intravenous Q8H PRN Watsno Gutierrez MD      hydrALAZINE  50 mg Oral [facility] Watson Gutierrez MD      HYDROmorphone  0 2 mg Intravenous Q1H PRN Myron Serna PA-C      insulin lispro  1-5 Units Subcutaneous HS MyronVEENA Gomez-ALTAGRACIA      insulin lispro  2-12 Units Subcutaneous 4 times day Myron Serna PA-C      methocarbamol  500 mg Oral Q6H [address] [facility], FABIOLA      metoprolol succinate  100 mg Oral Daily [facility], FABIOLA      ondansetron  4 mg Intravenous Q6H PRN [facility], FABIOLA      oxyCODONE  2 5 mg Oral Q4H PRN [facility], FABIOLA      oxyCODONE  5 mg Oral Q4H PRN [facility], FABIOLA      pantoprazole  40 mg Oral Early Morning [facility], FABIOLA      senna  1 tablet Oral Daily [facility], FABIOLA      warfarin  5 mg Oral Once (warfarin) Ceci Boyce DO          Today, Patient Was Seen By: Ceci Boyce DO    ** Please Note: Dictation voice to text software may have been used in the creation of this document   **

## 2022-03-17 NOTE — RESTORATIVE TECHNICIAN NOTE
Restorative Technician Note      Patient Name: Eli Huffman     Note Type: Mobility  Patient Position Upon Consult: Supine  Activity Performed: Ambulated; LUNHKEA; Stood  Assistive Device: Roller walker; Other (Comment) (Assist x1 with chair follow)  Education Provided: Yes  Patient Position at End of Consult: Bedside chair;  All needs within reach; Bed/Chair alarm activated    Mega VILLANUEVA, Restorative Technician, [employer]

## 2022-03-17 NOTE — PLAN OF CARE
Problem: PAIN - ADULT  Goal: Verbalizes/displays adequate comfort level or baseline comfort level  Description: Interventions:  - Encourage patient to monitor pain and request assistance  - Assess pain using appropriate pain scale  - Administer analgesics based on type and severity of pain and evaluate response  - Implement non-pharmacological measures as appropriate and evaluate response  - Notify physician/advanced practitioner if interventions unsuccessful or patient reports new pain  Outcome: Progressing     Problem: Neurological Deficit  Goal: Neurological status is stable or improving  Description: Interventions:  - Monitor and assess patient's level of consciousness, motor function, sensory function, and level of assistance needed for ADLs  - Monitor and report changes from baseline  Collaborate with interdisciplinary team to initiate plan and implement interventions as ordered  - Provide and maintain a safe environment  - Consider seizure precautions  - Consider fall precautions  - Consider aspiration precautions  - Consider bleeding precautions  Outcome: Progressing     Problem: Activity Intolerance/Impaired Mobility  Goal: Mobility/activity is maintained at optimum level for patient  Description: Interventions:  - Assess and monitor patient  barriers to mobility and need for assistive/adaptive devices  - Assess patient's emotional response to limitations  - Collaborate with interdisciplinary team and initiate plans and interventions as ordered  - Encourage independent activity per ability   - Maintain proper body alignment  - Perform active/passive rom as tolerated/ordered  - Plan activities to conserve energy   - Turn patient as appropriate  Outcome: Progressing     Problem: Nutrition/Hydration-ADULT  Goal: Nutrient/Hydration intake appropriate for improving, restoring or maintaining nutritional needs  Description: Monitor and assess patient's nutrition/hydration status for malnutrition  Collaborate with interdisciplinary team and initiate plan and interventions as ordered  Monitor patient's weight and dietary intake as ordered or per policy  Utilize nutrition screening tool and intervene as necessary  Determine patient's food preferences and provide high-protein, high-caloric foods as appropriate       INTERVENTIONS:  - Monitor oral intake, urinary output, labs, and treatment plans  - Assess nutrition and hydration status and recommend course of action  - Evaluate amount of meals eaten  - Assist patient with eating if necessary   - Allow adequate time for meals  - Recommend/ encourage appropriate diets, oral nutritional supplements, and vitamin/mineral supplements  - Order, calculate, and assess calorie counts as needed  - Assess need for intravenous fluids  - Provide specific nutrition/hydration education as appropriate  - Include patient/family/caregiver in decisions related to nutrition  Outcome: Progressing

## 2022-03-17 NOTE — ASSESSMENT & PLAN NOTE
· On Coumadin at home--> 5mg W/Sa, 2 5mg all other days (17 5mg/week)  · Continue heart rate control medications  · POD 6  · Daily INR while bridging to warfarin with heparin gtt (goal 2-3)--> started 3/14

## 2022-03-18 ENCOUNTER — HOSPITAL ENCOUNTER (INPATIENT)
Facility: HOSPITAL | Age: 72
LOS: 11 days | Discharge: HOME | DRG: 560 | End: 2022-03-29
Payer: COMMERCIAL

## 2022-03-18 VITALS
OXYGEN SATURATION: 97 % | RESPIRATION RATE: 17 BRPM | WEIGHT: 236.33 LBS | HEIGHT: 71 IN | HEART RATE: 56 BPM | SYSTOLIC BLOOD PRESSURE: 134 MMHG | TEMPERATURE: 98.1 F | DIASTOLIC BLOOD PRESSURE: 54 MMHG | BODY MASS INDEX: 33.09 KG/M2

## 2022-03-18 DIAGNOSIS — Z79.01 LONG TERM CURRENT USE OF ANTICOAGULANT: ICD-10-CM

## 2022-03-18 DIAGNOSIS — I10 ESSENTIAL HYPERTENSION: Chronic | ICD-10-CM

## 2022-03-18 DIAGNOSIS — L53.9 ERYTHEMA OF SKIN: ICD-10-CM

## 2022-03-18 DIAGNOSIS — E87.6 HYPOKALEMIA: ICD-10-CM

## 2022-03-18 DIAGNOSIS — R00.1 SINUS BRADYCARDIA: Primary | ICD-10-CM

## 2022-03-18 DIAGNOSIS — R51.9 HEAD PAIN CEPHALGIA: ICD-10-CM

## 2022-03-18 DIAGNOSIS — Z76.89 ENCOUNTER FOR SKIN CARE: ICD-10-CM

## 2022-03-18 DIAGNOSIS — R52 PAIN: ICD-10-CM

## 2022-03-18 DIAGNOSIS — G95.9 CERVICAL MYELOPATHY (HCC): ICD-10-CM

## 2022-03-18 DIAGNOSIS — Z91.89 AT RISK FOR INFECTION: ICD-10-CM

## 2022-03-18 DIAGNOSIS — Z79.01 ANTICOAGULATED ON COUMADIN: ICD-10-CM

## 2022-03-18 DIAGNOSIS — Z91.89 AT RISK FOR CONSTIPATION: ICD-10-CM

## 2022-03-18 LAB
ANION GAP SERPL CALCULATED.3IONS-SCNC: 6 MMOL/L (ref 4–13)
APTT PPP: 163 SECONDS (ref 23–37)
APTT PPP: 53 SECONDS (ref 23–37)
APTT PPP: 72 SECONDS (ref 23–37)
BASOPHILS # BLD AUTO: 0.02 THOUSANDS/ΜL (ref 0–0.1)
BASOPHILS NFR BLD AUTO: 0 % (ref 0–1)
BUN SERPL-MCNC: 16 MG/DL (ref 5–25)
CALCIUM SERPL-MCNC: 9.2 MG/DL (ref 8.3–10.1)
CHLORIDE SERPL-SCNC: 100 MMOL/L (ref 100–108)
CO2 SERPL-SCNC: 24 MMOL/L (ref 21–32)
CREAT SERPL-MCNC: 0.6 MG/DL (ref 0.6–1.3)
EOSINOPHIL # BLD AUTO: 0.05 THOUSAND/ΜL (ref 0–0.61)
EOSINOPHIL NFR BLD AUTO: 0 % (ref 0–6)
ERYTHROCYTE [DISTWIDTH] IN BLOOD BY AUTOMATED COUNT: 13.2 % (ref 11.6–15.1)
FLUAV RNA RESP QL NAA+PROBE: NEGATIVE
FLUBV RNA RESP QL NAA+PROBE: NEGATIVE
GFR SERPL CREATININE-BSD FRML MDRD: 101 ML/MIN/1.73SQ M
GLUCOSE SERPL-MCNC: 105 MG/DL (ref 65–140)
GLUCOSE SERPL-MCNC: 115 MG/DL (ref 65–140)
GLUCOSE SERPL-MCNC: 115 MG/DL (ref 65–140)
GLUCOSE SERPL-MCNC: 121 MG/DL (ref 65–140)
GLUCOSE SERPL-MCNC: 122 MG/DL (ref 65–140)
GLUCOSE SERPL-MCNC: 184 MG/DL (ref 65–140)
HCT VFR BLD AUTO: 37.6 % (ref 36.5–49.3)
HGB BLD-MCNC: 12.6 G/DL (ref 12–17)
IMM GRANULOCYTES # BLD AUTO: 0.1 THOUSAND/UL (ref 0–0.2)
IMM GRANULOCYTES NFR BLD AUTO: 1 % (ref 0–2)
INR PPP: 1.87 (ref 0.84–1.19)
LYMPHOCYTES # BLD AUTO: 1.18 THOUSANDS/ΜL (ref 0.6–4.47)
LYMPHOCYTES NFR BLD AUTO: 9 % (ref 14–44)
MCH RBC QN AUTO: 30.9 PG (ref 26.8–34.3)
MCHC RBC AUTO-ENTMCNC: 33.5 G/DL (ref 31.4–37.4)
MCV RBC AUTO: 92 FL (ref 82–98)
MONOCYTES # BLD AUTO: 1.02 THOUSAND/ΜL (ref 0.17–1.22)
MONOCYTES NFR BLD AUTO: 8 % (ref 4–12)
NEUTROPHILS # BLD AUTO: 10.81 THOUSANDS/ΜL (ref 1.85–7.62)
NEUTS SEG NFR BLD AUTO: 82 % (ref 43–75)
NRBC BLD AUTO-RTO: 0 /100 WBCS
PLATELET # BLD AUTO: 201 THOUSANDS/UL (ref 149–390)
PMV BLD AUTO: 9.8 FL (ref 8.9–12.7)
POTASSIUM SERPL-SCNC: 3.7 MMOL/L (ref 3.5–5.3)
PROTHROMBIN TIME: 20.7 SECONDS (ref 11.6–14.5)
RBC # BLD AUTO: 4.08 MILLION/UL (ref 3.88–5.62)
RSV RNA RESP QL NAA+PROBE: NEGATIVE
SARS-COV-2 RNA RESP QL NAA+PROBE: NEGATIVE
SODIUM SERPL-SCNC: 130 MMOL/L (ref 136–145)
WBC # BLD AUTO: 13.18 THOUSAND/UL (ref 4.31–10.16)

## 2022-03-18 PROCEDURE — 0241U HB NFCT DS VIR RESP RNA 4 TRGT: CPT | Performed by: INTERNAL MEDICINE

## 2022-03-18 PROCEDURE — 80048 BASIC METABOLIC PNL TOTAL CA: CPT | Performed by: STUDENT IN AN ORGANIZED HEALTH CARE EDUCATION/TRAINING PROGRAM

## 2022-03-18 PROCEDURE — 82948 REAGENT STRIP/BLOOD GLUCOSE: CPT

## 2022-03-18 PROCEDURE — 97116 GAIT TRAINING THERAPY: CPT

## 2022-03-18 PROCEDURE — 85730 THROMBOPLASTIN TIME PARTIAL: CPT

## 2022-03-18 PROCEDURE — 85730 THROMBOPLASTIN TIME PARTIAL: CPT | Performed by: INTERNAL MEDICINE

## 2022-03-18 PROCEDURE — 99232 SBSQ HOSP IP/OBS MODERATE 35: CPT

## 2022-03-18 PROCEDURE — 85610 PROTHROMBIN TIME: CPT | Performed by: STUDENT IN AN ORGANIZED HEALTH CARE EDUCATION/TRAINING PROGRAM

## 2022-03-18 PROCEDURE — 85025 COMPLETE CBC W/AUTO DIFF WBC: CPT | Performed by: STUDENT IN AN ORGANIZED HEALTH CARE EDUCATION/TRAINING PROGRAM

## 2022-03-18 PROCEDURE — NC001 PR NO CHARGE

## 2022-03-18 PROCEDURE — 97535 SELF CARE MNGMENT TRAINING: CPT

## 2022-03-18 PROCEDURE — 99222 1ST HOSP IP/OBS MODERATE 55: CPT | Performed by: INTERNAL MEDICINE

## 2022-03-18 PROCEDURE — 97530 THERAPEUTIC ACTIVITIES: CPT

## 2022-03-18 PROCEDURE — 99239 HOSP IP/OBS DSCHRG MGMT >30: CPT | Performed by: INTERNAL MEDICINE

## 2022-03-18 RX ORDER — CALCIUM CARBONATE 200(500)MG
1000 TABLET,CHEWABLE ORAL DAILY PRN
Status: DISCONTINUED | OUTPATIENT
Start: 2022-03-18 | End: 2022-03-29 | Stop reason: HOSPADM

## 2022-03-18 RX ORDER — BISACODYL 10 MG
10 SUPPOSITORY, RECTAL RECTAL DAILY PRN
Status: DISCONTINUED | OUTPATIENT
Start: 2022-03-18 | End: 2022-03-29 | Stop reason: HOSPADM

## 2022-03-18 RX ORDER — METHOCARBAMOL 500 MG/1
500 TABLET, FILM COATED ORAL EVERY 6 HOURS SCHEDULED
Status: DISCONTINUED | OUTPATIENT
Start: 2022-03-18 | End: 2022-03-22

## 2022-03-18 RX ORDER — FLECAINIDE ACETATE 100 MG/1
100 TABLET ORAL 2 TIMES DAILY
Status: DISCONTINUED | OUTPATIENT
Start: 2022-03-18 | End: 2022-03-29 | Stop reason: HOSPADM

## 2022-03-18 RX ORDER — CYCLOBENZAPRINE HCL 10 MG
5 TABLET ORAL 3 TIMES DAILY PRN
Status: CANCELLED | OUTPATIENT
Start: 2022-03-18

## 2022-03-18 RX ORDER — HYDRALAZINE HYDROCHLORIDE 50 MG/1
50 TABLET, FILM COATED ORAL EVERY 8 HOURS SCHEDULED
Status: CANCELLED | OUTPATIENT
Start: 2022-03-18

## 2022-03-18 RX ORDER — AMLODIPINE BESYLATE 5 MG/1
5 TABLET ORAL 2 TIMES DAILY
Status: DISCONTINUED | OUTPATIENT
Start: 2022-03-18 | End: 2022-03-29

## 2022-03-18 RX ORDER — ACETAMINOPHEN 325 MG/1
975 TABLET ORAL EVERY 8 HOURS SCHEDULED
Status: DISCONTINUED | OUTPATIENT
Start: 2022-03-18 | End: 2022-03-29 | Stop reason: HOSPADM

## 2022-03-18 RX ORDER — ACETAMINOPHEN 325 MG/1
975 TABLET ORAL EVERY 8 HOURS SCHEDULED
Status: CANCELLED | OUTPATIENT
Start: 2022-03-18

## 2022-03-18 RX ORDER — SENNOSIDES 8.6 MG
1 TABLET ORAL DAILY
Status: DISCONTINUED | OUTPATIENT
Start: 2022-03-19 | End: 2022-03-29 | Stop reason: HOSPADM

## 2022-03-18 RX ORDER — WARFARIN SODIUM 2.5 MG/1
2.5 TABLET ORAL
Status: CANCELLED | OUTPATIENT
Start: 2022-03-18

## 2022-03-18 RX ORDER — DEXAMETHASONE 2 MG/1
2 TABLET ORAL DAILY
Status: COMPLETED | OUTPATIENT
Start: 2022-03-19 | End: 2022-03-19

## 2022-03-18 RX ORDER — PANTOPRAZOLE SODIUM 40 MG/1
40 TABLET, DELAYED RELEASE ORAL
Status: DISCONTINUED | OUTPATIENT
Start: 2022-03-19 | End: 2022-03-29 | Stop reason: HOSPADM

## 2022-03-18 RX ORDER — WARFARIN SODIUM 2.5 MG/1
2.5 TABLET ORAL
Status: COMPLETED | OUTPATIENT
Start: 2022-03-18 | End: 2022-03-18

## 2022-03-18 RX ORDER — OXYCODONE HYDROCHLORIDE 5 MG/1
2.5 TABLET ORAL EVERY 4 HOURS PRN
Status: CANCELLED | OUTPATIENT
Start: 2022-03-18

## 2022-03-18 RX ORDER — HEPARIN SODIUM 1000 [USP'U]/ML
4400 INJECTION, SOLUTION INTRAVENOUS; SUBCUTANEOUS
Status: CANCELLED | OUTPATIENT
Start: 2022-03-18

## 2022-03-18 RX ORDER — ATORVASTATIN CALCIUM 40 MG/1
40 TABLET, FILM COATED ORAL EVERY EVENING
Status: DISCONTINUED | OUTPATIENT
Start: 2022-03-18 | End: 2022-03-29 | Stop reason: HOSPADM

## 2022-03-18 RX ORDER — ATORVASTATIN CALCIUM 40 MG/1
40 TABLET, FILM COATED ORAL EVERY EVENING
Status: CANCELLED | OUTPATIENT
Start: 2022-03-18

## 2022-03-18 RX ORDER — DULOXETIN HYDROCHLORIDE 60 MG/1
60 CAPSULE, DELAYED RELEASE ORAL
Status: CANCELLED | OUTPATIENT
Start: 2022-03-18

## 2022-03-18 RX ORDER — METHOCARBAMOL 500 MG/1
500 TABLET, FILM COATED ORAL EVERY 6 HOURS SCHEDULED
Status: CANCELLED | OUTPATIENT
Start: 2022-03-18

## 2022-03-18 RX ORDER — PANTOPRAZOLE SODIUM 40 MG/1
40 TABLET, DELAYED RELEASE ORAL
Status: CANCELLED | OUTPATIENT
Start: 2022-03-19

## 2022-03-18 RX ORDER — SENNOSIDES 8.6 MG
1 TABLET ORAL DAILY
Status: CANCELLED | OUTPATIENT
Start: 2022-03-18

## 2022-03-18 RX ORDER — METOPROLOL SUCCINATE 100 MG/1
100 TABLET, EXTENDED RELEASE ORAL DAILY
Status: DISCONTINUED | OUTPATIENT
Start: 2022-03-19 | End: 2022-03-29 | Stop reason: HOSPADM

## 2022-03-18 RX ORDER — DOCUSATE SODIUM 100 MG/1
100 CAPSULE, LIQUID FILLED ORAL 2 TIMES DAILY
Status: CANCELLED | OUTPATIENT
Start: 2022-03-18

## 2022-03-18 RX ORDER — CYCLOBENZAPRINE HCL 5 MG
5 TABLET ORAL 3 TIMES DAILY PRN
Status: DISCONTINUED | OUTPATIENT
Start: 2022-03-18 | End: 2022-03-22

## 2022-03-18 RX ORDER — DULOXETIN HYDROCHLORIDE 60 MG/1
60 CAPSULE, DELAYED RELEASE ORAL
Status: DISCONTINUED | OUTPATIENT
Start: 2022-03-18 | End: 2022-03-29 | Stop reason: HOSPADM

## 2022-03-18 RX ORDER — CALCIUM CARBONATE 200(500)MG
1000 TABLET,CHEWABLE ORAL DAILY PRN
Status: CANCELLED | OUTPATIENT
Start: 2022-03-18

## 2022-03-18 RX ORDER — HEPARIN SODIUM 1000 [USP'U]/ML
8800 INJECTION, SOLUTION INTRAVENOUS; SUBCUTANEOUS
Status: DISCONTINUED | OUTPATIENT
Start: 2022-03-18 | End: 2022-03-20

## 2022-03-18 RX ORDER — AMLODIPINE BESYLATE 5 MG/1
5 TABLET ORAL 2 TIMES DAILY
Status: CANCELLED | OUTPATIENT
Start: 2022-03-18

## 2022-03-18 RX ORDER — OXYCODONE HYDROCHLORIDE 5 MG/1
5 TABLET ORAL EVERY 4 HOURS PRN
Status: CANCELLED | OUTPATIENT
Start: 2022-03-18

## 2022-03-18 RX ORDER — FLECAINIDE ACETATE 100 MG/1
100 TABLET ORAL 2 TIMES DAILY
Status: CANCELLED | OUTPATIENT
Start: 2022-03-18

## 2022-03-18 RX ORDER — HEPARIN SODIUM 1000 [USP'U]/ML
4400 INJECTION, SOLUTION INTRAVENOUS; SUBCUTANEOUS
Status: DISCONTINUED | OUTPATIENT
Start: 2022-03-18 | End: 2022-03-20

## 2022-03-18 RX ORDER — DOCUSATE SODIUM 100 MG/1
100 CAPSULE, LIQUID FILLED ORAL 2 TIMES DAILY
Status: DISCONTINUED | OUTPATIENT
Start: 2022-03-18 | End: 2022-03-29 | Stop reason: HOSPADM

## 2022-03-18 RX ORDER — HEPARIN SODIUM 1000 [USP'U]/ML
8800 INJECTION, SOLUTION INTRAVENOUS; SUBCUTANEOUS
Status: CANCELLED | OUTPATIENT
Start: 2022-03-18

## 2022-03-18 RX ORDER — METOPROLOL SUCCINATE 100 MG/1
100 TABLET, EXTENDED RELEASE ORAL DAILY
Status: CANCELLED | OUTPATIENT
Start: 2022-03-18

## 2022-03-18 RX ORDER — WARFARIN SODIUM 2.5 MG/1
2.5 TABLET ORAL
Status: DISCONTINUED | OUTPATIENT
Start: 2022-03-18 | End: 2022-03-18 | Stop reason: HOSPADM

## 2022-03-18 RX ORDER — DEXAMETHASONE 2 MG/1
2 TABLET ORAL DAILY
Status: CANCELLED | OUTPATIENT
Start: 2022-03-18 | End: 2022-03-19

## 2022-03-18 RX ORDER — OXYCODONE HYDROCHLORIDE 5 MG/1
2.5 TABLET ORAL EVERY 4 HOURS PRN
Status: DISCONTINUED | OUTPATIENT
Start: 2022-03-18 | End: 2022-03-29 | Stop reason: HOSPADM

## 2022-03-18 RX ORDER — HYDRALAZINE HYDROCHLORIDE 50 MG/1
50 TABLET, FILM COATED ORAL EVERY 8 HOURS SCHEDULED
Status: DISCONTINUED | OUTPATIENT
Start: 2022-03-18 | End: 2022-03-22

## 2022-03-18 RX ORDER — POLYETHYLENE GLYCOL 3350 17 G/17G
17 POWDER, FOR SOLUTION ORAL DAILY PRN
Status: DISCONTINUED | OUTPATIENT
Start: 2022-03-18 | End: 2022-03-29 | Stop reason: HOSPADM

## 2022-03-18 RX ORDER — OXYCODONE HYDROCHLORIDE 5 MG/1
5 TABLET ORAL EVERY 4 HOURS PRN
Status: DISCONTINUED | OUTPATIENT
Start: 2022-03-18 | End: 2022-03-29 | Stop reason: HOSPADM

## 2022-03-18 RX ADMIN — ATORVASTATIN CALCIUM 40 MG: 40 TABLET, FILM COATED ORAL at 22:09

## 2022-03-18 RX ADMIN — INSULIN LISPRO 2 UNITS: 100 INJECTION, SOLUTION INTRAVENOUS; SUBCUTANEOUS at 13:54

## 2022-03-18 RX ADMIN — WARFARIN SODIUM 2.5 MG: 2.5 TABLET ORAL at 22:17

## 2022-03-18 RX ADMIN — HYDRALAZINE HYDROCHLORIDE 50 MG: 50 TABLET ORAL at 13:44

## 2022-03-18 RX ADMIN — HYDRALAZINE HYDROCHLORIDE 50 MG: 50 TABLET, FILM COATED ORAL at 22:05

## 2022-03-18 RX ADMIN — PANTOPRAZOLE SODIUM 40 MG: 40 TABLET, DELAYED RELEASE ORAL at 06:28

## 2022-03-18 RX ADMIN — STANDARDIZED SENNA CONCENTRATE 8.6 MG: 8.6 TABLET ORAL at 08:09

## 2022-03-18 RX ADMIN — ACETAMINOPHEN 975 MG: 325 TABLET ORAL at 22:06

## 2022-03-18 RX ADMIN — DOCUSATE SODIUM 100 MG: 100 CAPSULE ORAL at 08:10

## 2022-03-18 RX ADMIN — DOCUSATE SODIUM 100 MG: 100 CAPSULE, LIQUID FILLED ORAL at 22:10

## 2022-03-18 RX ADMIN — ACETAMINOPHEN 325MG 975 MG: 325 TABLET ORAL at 06:29

## 2022-03-18 RX ADMIN — DULOXETINE HYDROCHLORIDE 60 MG: 60 CAPSULE, DELAYED RELEASE ORAL at 22:05

## 2022-03-18 RX ADMIN — ACETAMINOPHEN 325MG 975 MG: 325 TABLET ORAL at 13:43

## 2022-03-18 RX ADMIN — HEPARIN SODIUM 10 UNITS/KG/HR: 10000 INJECTION, SOLUTION INTRAVENOUS; SUBCUTANEOUS at 10:53

## 2022-03-18 RX ADMIN — FLECAINIDE ACETATE 100 MG: 100 TABLET ORAL at 10:54

## 2022-03-18 RX ADMIN — DEXAMETHASONE 2 MG: 2 TABLET ORAL at 08:10

## 2022-03-18 RX ADMIN — AMLODIPINE BESYLATE 5 MG: 5 TABLET ORAL at 22:06

## 2022-03-18 RX ADMIN — HEPARIN SODIUM 4400 UNITS: 1000 INJECTION INTRAVENOUS; SUBCUTANEOUS at 15:16

## 2022-03-18 RX ADMIN — FLECAINIDE ACETATE 100 MG: 100 TABLET ORAL at 22:07

## 2022-03-18 RX ADMIN — METOPROLOL SUCCINATE 100 MG: 100 TABLET, EXTENDED RELEASE ORAL at 08:10

## 2022-03-18 RX ADMIN — HYDRALAZINE HYDROCHLORIDE 50 MG: 50 TABLET ORAL at 06:29

## 2022-03-18 RX ADMIN — AMLODIPINE BESYLATE 5 MG: 5 TABLET ORAL at 08:14

## 2022-03-18 RX ADMIN — METHOCARBAMOL 500 MG: 500 TABLET, FILM COATED ORAL at 06:29

## 2022-03-18 RX ADMIN — METHOCARBAMOL 500 MG: 500 TABLET, FILM COATED ORAL at 13:43

## 2022-03-18 NOTE — ARC ADMISSION
[facility] Admission Liaison contacted Kim Baltazar  Who confirmed [facility] is in network and provided benefit information which will be entered into patients Pre-Admission Screen  [facility] Admission Liaison contacted [facility] Representative Amritne Evita   (Ph: [phone]) who accepted patients clinical information verbally over the phone then stated patient has been approved for Acute Inpatient Rehab: Auth# [id number] starting today, 3/18/22 through 3/22/22, NRD 3/22/22 Call [phone] to provide verbal clinical      Pending results of patients Covid test from today patient will admit to [facility], Room 451 today, with a transport time of 4:00 PM, for Nurse Report please call x [phone]  Provided update with same to Santana Bolaños 
Met with patient and spouse at bedside: introduced self, explained role, ARC program/services, room number, transfer time, anticipated rehab length of stay, benefits including Co-pays/max out of pocket expenses and rehab program booklet  [facility] Rehab folder left with spouse  All questions answered 
Notified by PEBBLES EVERETT  Patient is cleared for discharge  After reviewing patients case with [place] physician patient has been approved pending insurance authorization approval    Patients insurance [place] [id number] only accepts therapy notes that were completed within 24 hours to submit for authorization  Provided update to PEBBLES EVERETT  With same  [place] Admission Liaison will submit for authorization once updated therapy notes are available 
Patient still in his room at this time  Voice mail left for charge Transport phone to expedite transport to [facility] 
Referral received for consideration of patient for Inpatient Acute Rehab, will review patients case and continue to follow until a determination can be made 
Reviewed patients case with [facility] physician, patient is appropriate for acute inpatient rehab  Informed CM of same through [employer] with same 
None

## 2022-03-18 NOTE — PROGRESS NOTES
[address] [facility]  Progress Note - Alexandria Bernard 1950, 71 y o  male MRN: [id number]  Unit/Bed#: -01 Encounter: [id number]  Primary Care Provider: Marshall Fernandez MD   Date and time admitted to hospital: 3/5/2022  8:18 PM    * Cervical myelopathy [facility])  Assessment & Plan  · S/p Anterior cervical discectomy and fixation fusion C5/6 and C6/7; Posterior cervical decompression and instrumented fusion C3-T1, 3/11/2022  Plan  · Neurosurgery following  · On Decadron taper  · PT/OT  · PMR consult for possible ARC placement    Paroxysmal A-fib (HCC)  Assessment & Plan  · On Coumadin at home--> 5mg W/Sa, 2 5mg all other days (17 5mg/week)  · Continue heart rate control medications  · POD 6  · Daily INR while bridging to warfarin with heparin gtt (goal 2-3)--> started 3/14     Muscle spasm  Assessment & Plan  · Complaining of muscle spasm of left shoulder due to the collar  Plan  · Trial of flexiril    Leukocytosis  Assessment & Plan  · Afebrile and hemodynamically stable  · Likely due to steroid use  · Continue to monitor    Goals of care, counseling/discussion  Assessment & Plan  Discussed code status with patient and his spouse who was present on the phone during the time of the conversation  Patient continues to stay that he wishes to be DNR DNI  He understands that the DNI order will need to be rescinded prior to procedure if he requires intubation and reinstated after the procedure  He agrees and also does his wife  Sinus bradycardia  Assessment & Plan  · Sinus bradycardia on EKG  · Patient asymptomatic and stable  · Cardiology consulted and signed off    Mixed hyperlipidemia  Assessment & Plan  · Continue statin    Factor V Leiden mutation ([facility] [address] )  Assessment & Plan  · On Coumadin outpatient  · As above    WILL (obstructive sleep apnea)  Assessment & Plan  · Continue CPAP at night      Essential hypertension  Assessment & Plan  · Uncontrolled, likely due to steroid use; improved with addition of hydralazine and increased amlodipine--> after steroid taper, take off hydralazine because it is it is not first-line--> if still hypertensive after end of steroid taper, consider adding on ACEI/ARB  · Continue home blood pressure meds       VTE Pharmacologic Prophylaxis:   Pharmacologic: Heparin Drip, Warfarin  Mechanical VTE Prophylaxis in Place: Yes    Patient Centered Rounds: I have performed bedside rounds with nursing staff today  Discussions with Specialists or Other Care Team Provider: Neurosurgery, PT/OT    Education and Discussions with Family / Patient: Will attempt to contact wife     Time Spent for Care: 30 minutes  More than 50% of total time spent on counseling and coordination of care as described above  Current Length of Stay: 13 day(s)    Current Patient Status: Inpatient   Certification Statement: The patient will continue to require additional inpatient hospital stay due to pending insurance auth for [facility]  Discharge Plan: ARC    Code Status: Level 3 - DNAR and DNI      Subjective: The patient was seen and examined in his hospital chair  He continues to feel well and strong, having no symptomatic complaints  He states that he had a headache last night, but that is resolved of this morning  Told me that he is drinking a lot of water this morning to allow his veins to get bigger for blood draws--they were unsuccessful drawing any earlier in the morning  Objective:     Vitals:   Temp (24hrs), Av 7 °F (36 5 °C), Min:97 6 °F (36 4 °C), Max:97 7 °F (36 5 °C)    Temp:  [97 6 °F (36 4 °C)-97 7 °F (36 5 °C)] 97 7 °F (36 5 °C)  HR:  [49-56] 56  Resp:  [17-21] 17  BP: (122-168)/(65-82) 137/70  SpO2:  [97 %-98 %] 97 %  Body mass index is 32 96 kg/m²  Input and Output Summary (last 24 hours):        Intake/Output Summary (Last 24 hours) at 3/18/2022 1318  Last data filed at 3/18/2022 0341  Gross per 24 hour   Intake 360 ml   Output 700 ml   Net -340 ml       Physical Exam:     Physical Exam  Vitals reviewed  Constitutional:       General: He is not in acute distress  Appearance: He is well-developed  He is not diaphoretic  HENT:      Head: Normocephalic and atraumatic  Eyes:      General: No scleral icterus  Conjunctiva/sclera: Conjunctivae normal    Neck:      Thyroid: No thyromegaly  Trachea: No tracheal deviation  Comments: Neck brace present  Cardiovascular:      Rate and Rhythm: Normal rate and regular rhythm  Heart sounds: Normal heart sounds  Pulmonary:      Effort: Pulmonary effort is normal       Breath sounds: Normal breath sounds  Abdominal:      General: Bowel sounds are normal  There is no distension  Palpations: Abdomen is soft  Tenderness: There is no abdominal tenderness  Musculoskeletal:         General: No tenderness  Cervical back: Neck supple  Lymphadenopathy:      Cervical: No cervical adenopathy  Skin:     General: Skin is warm  Coloration: Skin is not pale  Findings: No erythema  Neurological:      Mental Status: He is alert and oriented to person, place, and time  Psychiatric:         Mood and Affect: Mood normal          Behavior: Behavior normal          Thought Content:  Thought content normal          Judgment: Judgment normal          Additional Data:     Labs:    Results from last 7 days   Lab Units 03/18/22  1148   WBC Thousand/uL 13 18*   HEMOGLOBIN g/dL 12 6   HEMATOCRIT % 37 6   PLATELETS Thousands/uL 201   NEUTROS PCT % 82*   LYMPHS PCT % 9*   MONOS PCT % 8   EOS PCT % 0     Results from last 7 days   Lab Units 03/18/22  1148   SODIUM mmol/L 130*   POTASSIUM mmol/L 3 7   CHLORIDE mmol/L 100   CO2 mmol/L 24   BUN mg/dL 16   CREATININE mg/dL 0 60   ANION GAP mmol/L 6   CALCIUM mg/dL 9 2   GLUCOSE RANDOM mg/dL 115     Results from last 7 days   Lab Units 03/17/22  0539   INR  1 24*     Results from last 7 days   Lab Units 03/18/22  1204 03/18/22  0625 03/17/22  2057 03/17/22  1834 03/17/22  1056 03/17/22  0912 03/17/22  0545 03/16/22  2115 03/16/22  1539 03/16/22  1401 03/16/22  1037 03/16/22  0557   POC GLUCOSE mg/dl 115 105 138 132 91 108 126 112 123 143* 114 127                   * I Have Reviewed All Lab Data Listed Above  * Additional Pertinent Lab Tests Reviewed:  All Labs Within Last 24 Hours Reviewed    Imaging:    Imaging Reports Reviewed Today Include:   Imaging Personally Reviewed by Myself Includes:      Recent Cultures (last 7 days):           Last 24 Hours Medication List:   Current Facility-Administered Medications   Medication Dose Route Frequency Provider Last Rate    acetaminophen  975 mg Oral [facility] Elmo Garay PA-C      amLODIPine  5 mg Oral BID Savannah Hill MD      atorvastatin  40 mg Oral QPM Elmo Garay PA-C      calcium carbonate  1,000 mg Oral Daily PRN Elmo Garay PA-C      cyclobenzaprine  5 mg Oral TID PRN Viviane Dickey MD      dexamethasone  2 mg Oral Daily Elmo Garay PA-C      Diclofenac Sodium  2 g Topical 4x Daily Jamil Levo, DO      docusate sodium  100 mg Oral BID Elmo Garay PA-C      DULoxetine  60 mg Oral HS Elmo Garay PA-C      flecainide  100 mg Oral BID Elmo Garay PA-C      heparin (porcine)  3-30 Units/kg/hr (Order-Specific) Intravenous Titrated Viviane Dickey MD 10 Units/kg/hr (03/18/22 1053)    heparin (porcine)  4,400 Units Intravenous Q1H PRN Viviane Dickey MD      heparin (porcine)  8,800 Units Intravenous Q1H PRN Viviane Dickey MD      hydrALAZINE  10 mg Intravenous Q8H PRN Viviane Dickey MD      hydrALAZINE  50 mg Oral [facility] Viviane Dickey MD      HYDROmorphone  0 2 mg Intravenous Q1H PRN Elmo Garay PA-C      insulin lispro  1-5 Units Subcutaneous HS Elmo Garay PA-C      insulin lispro  2-12 Units Subcutaneous 4 times day Elmo Garay PA-C      methocarbamol  500 mg Oral Q6H [facility] Lawrence EVERETT Alex Parra, FABIOLA      metoprolol succinate  100 mg Oral Daily C[facility], FABIOLA      ondansetron  4 mg Intravenous Q6H PRN Cathyann Hind, FABIOLA      oxyCODONE  2 5 mg Oral Q4H PRN Cathyann Hind, PA-ALTAGRACIA      oxyCODONE  5 mg Oral Q4H PRN Cathyann [facility], FABIOLA      pantoprazole  40 mg Oral Early Morning Cathyann [facility], FABIOLA      senna  1 tablet Oral Daily [facility], FABIOLA          Today, Patient Was Seen By: Vicky Judge DO    ** Please Note: Dictation voice to text software may have been used in the creation of this document   **

## 2022-03-18 NOTE — PLAN OF CARE
Problem: PHYSICAL THERAPY ADULT  Goal: Performs mobility at highest level of function for planned discharge setting  See evaluation for individualized goals  Description: Treatment/Interventions: Functional transfer training,LE strengthening/ROM,Therapeutic exercise,Endurance training,Patient/family training,Equipment eval/education,Bed mobility,Gait training,Elevations,Spoke to nursing          See flowsheet documentation for full assessment, interventions and recommendations  Outcome: Progressing  Note: Prognosis: Excellent  Problem List: Decreased strength,Decreased range of motion,Decreased endurance,Impaired balance,Decreased mobility,Decreased coordination,Decreased skin integrity,Orthopedic restrictions,Pain  Assessment: Pt seen for PT session for gait and transfers training; progression/ trial of gait w/ HHA; transfers from lower surfaces w/o arm rests; standing therex  Pt does continue to require inc assist w/ transfers from lower surfaces (Milly) w/ cues for technique and forward weight shift over AZRA  Ambulation w/ RW short household distances w/ VC TC for posture and balance especially w/ turns and obstacle negotiation- noted decreased AZRA w/ pt requiring cues for correction  Trial w/ HHA- pt w/ arms in high guard; very fearful of falling  Pt w/ increased R calf "cramping" w/ inc distances and fatgiue  At this time pt continues to function well below baseline; poses inc falls and readmission risk and PT d/c recommendation remain unchanged to comprehensive inpt rehab program  Pt is A+O x4 w/ good safety awareness and  excellent potential to achieve MI/ indep PT goals in a reasonable time frame w/ intensive rehab program  Will continue to follow and progress while in inpt  Barriers to Discharge: Inaccessible home environment,Decreased caregiver support        PT Discharge Recommendation: Post acute rehabilitation services          See flowsheet documentation for full assessment

## 2022-03-18 NOTE — DISCHARGE INSTR - AVS FIRST PAGE
Coinue iv heparin coumadin bridge  Coumadin 25mg tonight and repeat INR in AM  Re dose coumadin based omn INR

## 2022-03-18 NOTE — PHYSICAL THERAPY NOTE
PHYSICAL THERAPY TREATMENT  NAME:  Eli Huffman  DATE: 03/18/22    AGE:   71 y o  Mrn:   [id number]  ADMIT DX:  Cervical myelopathy ([facility] ) [G95 9]    Past Medical History:   Diagnosis Date    Acute venous embolism and thrombosis of deep vessels of proximal lower extremity (HCC)     Last assessed: 5/18/15    Arthritis     Cellulitis     LE    CPAP (continuous positive airway pressure) dependence     Factor V Leiden ([facility] )     Forgetfulness     [place] (hard of hearing)     Paroxysmal atrial fibrillation ([facility] )     Last assessed: 11/2/15    Sleep apnea      Past Surgical History:   Procedure Laterality Date    BACK SURGERY      Lower    COLON SURGERY      COLONOSCOPY      SC ARTHRODESIS ANT INTERBODY MIN DISCECTOMY, CERVICAL BELOW C2 N/A 3/11/2022    Procedure: Anterior cervical discectomy and fixation fusion C5/6 and C6/7; Posterior cervical decompression and instrumented fusion C3-T1; Surgeon: Taiwo Robledo MD;  Location: [facility];  Service: Neurosurgery       Length Of Stay: 13     03/18/22 0946   PT Last Visit   PT Visit Date 03/18/22   Note Type   Note Type Treatment for insurance authorization   End of Consult   Patient Position at End of Consult All needs within reach; Bedside chair   Pain Assessment   Pain Assessment Tool 0-10   Pain Score 2   Pain Location/Orientation Location: Neck   Pain Radiating Towards shoulders   Pain Onset/Description Onset: Ongoing  (stiffness)   Hospital Pain Intervention(s) Relaxation technique;Repositioned; Ambulation/increased activity   Restrictions/Precautions   Weight Bearing Precautions Per Order No   Braces or Orthoses C/S Collar   Other Precautions Multiple lines; Fall Risk;Pain;Spinal precautions   General   Chart Reviewed Yes   Response to Previous Treatment Patient with no complaints from previous session     Family/Caregiver Present No   Cognition   Overall Cognitive Status WFL   Arousal/Participation Alert   Attention Within functional limits Orientation Level Oriented X4   Memory Within functional limits   Following Commands Follows all commands and directions without difficulty   Subjective   Subjective pt reports eager to get to rehab   Bed Mobility   Supine to Sit 4  Minimal assistance  (from flat surface)   Sit to Supine 5  Supervision  (needs HOB elevated for comfort )   Additional Comments inc time and cues for spinal precations    Transfers   Sit to Stand 5  Supervision  (CGA from lower surfaces w/o arm rsts)   Stand to Sit 4  Minimal assistance  (CGA to lower surfaces w/o arm rsts )   Ambulation/Elevation   Gait pattern Excessively slow   Gait Assistance 4  Minimal assist  (CGA w/ RW- Milly w/o AD)   Assistive Device Rolling walker   Distance 60+70 w/ RW- postural degrations w/ inc distances adn fatigue  increased distances causing reports of R calf "cramping"- but w/o LOB; min/ HHA trial 30' x1- unstead gait w/ high guard arm position-- short shuffling steps w/ pt reports very fearful of falling    Balance   Static Sitting Good   Dynamic Sitting Fair +   Static Standing Fair -   Dynamic Standing Poor +   Ambulatory Poor +   Endurance Deficit   Endurance Deficit Yes   Endurance Deficit Description weakness ; fatigue and increased R calf "cramping" w/ increased distances"    Activity Tolerance   Activity Tolerance Patient limited by fatigue;Patient tolerated treatment well   Medical Staff Made Aware care coordination w/ OT/ CM RN    Nurse Made Aware yes- cleared for session    Exercises   Hip Flexion Standing;10 reps;Right;Left  (w/ HHA)   Hip Abduction Standing;10 reps;Right;Left  (w/ HHA)   Balance training  STS from lower seat surfaces w/ min> CGA (no armrests)- easily fatgiues    Assessment   Prognosis Excellent   Problem List Decreased strength;Decreased range of motion;Decreased endurance; Impaired balance;Decreased mobility; Decreased coordination;Decreased skin integrity;Orthopedic restrictions;Pain   Assessment Pt seen for PT session for gait and transfers training; progression/ trial of gait w/ HHA; transfers from lower surfaces w/o arm rests; standing therex  Pt does continue to require inc assist w/ transfers from lower surfaces (Milly) w/ cues for technique and forward weight shift over AZRA  Ambulation w/ RW short household distances w/ VC TC for posture and balance especially w/ turns and obstacle negotiation- noted decreased AZRA w/ pt requiring cues for correction  Trial w/ HHA- pt w/ arms in high guard; very fearful of falling  Pt w/ increased R calf "cramping" w/ inc distances and fatgiue  At this time pt continues to function well below baseline; poses inc falls and readmission risk and PT d/c recommendation remain unchanged to comprehensive inpt rehab program  Pt is A+O x4 w/ good safety awareness and  excellent potential to achieve MI/ indep PT goals in a reasonable time frame w/ intensive rehab program  Will continue to follow and progress while in inpt  Barriers to Discharge Inaccessible home environment;Decreased caregiver support   Goals   Patient Goals regain independence    STG Expiration Date 03/26/22   PT Treatment Day 2   Plan   Treatment/Interventions Functional transfer training;LE strengthening/ROM; Elevations; Therapeutic exercise; Endurance training;Patient/family training;Equipment eval/education; Bed mobility; ADL retraining;Gait training; Compensatory technique education;Spoke to MD;Spoke to nursing;Spoke to case management;OT   Progress Progressing toward goals   PT Frequency   (3-6x/wk)   Recommendation   PT Discharge Recommendation Post acute rehabilitation services   AM-PAC Basic Mobility Inpatient   Turning in Bed Without Bedrails 3   Lying on Back to Sitting on Edge of Flat Bed 3   Moving Bed to Chair 3   Standing Up From Chair 3   Walk in Room 3   Climb 3-5 Stairs 2   Basic Mobility Inpatient Raw Score 17   Basic Mobility Standardized Score 39 67   Highest Level Of Mobility   -[facility] Goal 5: Stand one or more mins Education   Education Provided Mobility training;Home exercise program;Assistive device  (spinal precautions )   Patient Demonstrates acceptance/verbal understanding;Explanation/teachback used;Demonstrates verbal understanding   End of Consult   Patient Position at End of Consult Bedside chair; All needs within reach            Ramona Collet, YUMIKO

## 2022-03-18 NOTE — PLAN OF CARE
Problem: MOBILITY - ADULT  Goal: Maintain or return to baseline ADL function  Description: INTERVENTIONS:  -  Assess patient's ability to carry out ADLs; assess patient's baseline for ADL function and identify physical deficits which impact ability to perform ADLs (bathing, care of mouth/teeth, toileting, grooming, dressing, etc )  - Assess/evaluate cause of self-care deficits   - Assess range of motion  - Assess patient's mobility; develop plan if impaired  - Assess patient's need for assistive devices and provide as appropriate  - Encourage maximum independence but intervene and supervise when necessary  - Involve family in performance of ADLs  - Assess for home care needs following discharge   - Consider OT consult to assist with ADL evaluation and planning for discharge  - Provide patient education as appropriate  Outcome: Progressing  Goal: Maintains/Returns to pre admission functional level  Description: INTERVENTIONS:  - Perform BMAT or MOVE assessment daily    - Set and communicate daily mobility goal to care team and patient/family/caregiver     - Collaborate with rehabilitation services on mobility goals if consulted  - Dangle patient 3 times a day  - Stand patient 3 times a day  - Ambulate patient 3 times a day  - Out of bed to chair 3 times a day   - Out of bed for meals 3 times a day  - Out of bed for toileting  - Record patient progress and toleration of activity level   Outcome: Progressing     Problem: PAIN - ADULT  Goal: Verbalizes/displays adequate comfort level or baseline comfort level  Description: Interventions:  - Encourage patient to monitor pain and request assistance  - Assess pain using appropriate pain scale  - Administer analgesics based on type and severity of pain and evaluate response  - Implement non-pharmacological measures as appropriate and evaluate response  - Notify physician/advanced practitioner if interventions unsuccessful or patient reports new pain  Outcome: Progressing Problem: INFECTION - ADULT  Goal: Absence or prevention of progression during hospitalization  Description: INTERVENTIONS:  - Assess and monitor for signs and symptoms of infection  - Monitor lab/diagnostic results  - Monitor all insertion sites, i e  indwelling lines, tubes, and drains  - [place] appropriate cooling/warming therapies per order  - Administer medications as ordered  - Instruct and encourage patient and family to use good hand hygiene technique  - Identify and instruct in appropriate isolation precautions for identified infection/condition  Outcome: Progressing     Problem: SAFETY ADULT  Goal: Maintain or return to baseline ADL function  Description: INTERVENTIONS:  -  Assess patient's ability to carry out ADLs; assess patient's baseline for ADL function and identify physical deficits which impact ability to perform ADLs (bathing, care of mouth/teeth, toileting, grooming, dressing, etc )  - Assess/evaluate cause of self-care deficits   - Assess range of motion  - Assess patient's mobility; develop plan if impaired  - Assess patient's need for assistive devices and provide as appropriate  - Encourage maximum independence but intervene and supervise when necessary  - Involve family in performance of ADLs  - Assess for home care needs following discharge   - Consider OT consult to assist with ADL evaluation and planning for discharge  - Provide patient education as appropriate  Outcome: Progressing  Goal: Maintains/Returns to pre admission functional level  Description: INTERVENTIONS:  - Perform BMAT or MOVE assessment daily    - Set and communicate daily mobility goal to care team and patient/family/caregiver     - Collaborate with rehabilitation services on mobility goals if consulted  - Dangle patient 3 times a day  - Stand patient 3 times a day  - Ambulate patient 3 times a day  - Out of bed to chair 3 times a day   - Out of bed for meals 3 times a day  - Out of bed for toileting  - Record patient progress and toleration of activity level   Outcome: Progressing  Goal: Patient will remain free of falls  Description: INTERVENTIONS:  - Educate patient/family on patient safety including physical limitations  - Instruct patient to call for assistance with activity   - Consult OT/PT to assist with strengthening/mobility   - Keep Call bell within reach  - Keep bed low and locked with side rails adjusted as appropriate  - Keep care items and personal belongings within reach  - Initiate and maintain comfort rounds  - Make Fall Risk Sign visible to staff  - Offer Toileting every  advance of need  - Initiat  - Obtain necessary fall risk management equ  - Apply yellow socks and bracelet for high fall risk patients  - Consider moving patient to room near nurses station  Outcome: Progressing     Problem: DISCHARGE PLANNING  Goal: Discharge to home or other facility with appropriate resources  Description: INTERVENTIONS:  - Identify barriers to discharge w/patient and caregiver  - Arrange for needed discharge resources and transportation as appropriate  - Identify discharge learning needs (meds, wound care, etc )  - Refer to Case Management Department for coordinating discharge planning if the patient needs post-hospital services based on physician/advanced practitioner order or complex needs related to functional status, cognitive ability, or social support system  Outcome: Progressing     Problem: Knowledge Deficit  Goal: Patient/family/caregiver demonstrates understanding of disease process, treatment plan, medications, and discharge instructions  Description: Complete learning assessment and assess knowledge base    Interventions:  - Provide teaching at level of understanding  - Provide teaching via preferred learning methods  Outcome: Progressing     Problem: Neurological Deficit  Goal: Neurological status is stable or improving  Description: Interventions:  - Monitor and assess patient's level of consciousness, motor function, sensory function, and level of assistance needed for ADLs  - Monitor and report changes from baseline  Collaborate with interdisciplinary team to initiate plan and implement interventions as ordered  - Provide and maintain a safe environment  - Consider seizure precautions  - Consider fall precautions  - Consider aspiration precautions  - Consider bleeding precautions  Outcome: Progressing     Problem: Activity Intolerance/Impaired Mobility  Goal: Mobility/activity is maintained at optimum level for patient  Description: Interventions:  - Assess and monitor patient  barriers to mobility and need for assistive/adaptive devices  - Assess patient's emotional response to limitations  - Collaborate with interdisciplinary team and initiate plans and interventions as ordered  - Encourage independent activity per ability   - Maintain proper body alignment  - Perform active/passive rom as tolerated/ordered  - Plan activities to conserve energy   - Turn patient as appropriate  Outcome: Progressing     Problem: Communication Impairment  Goal: Ability to express needs and understand communication  Description: Assess patient's communication skills and ability to understand information  Patient will demonstrate use of effective communication techniques, alternative methods of communication and understanding even if not able to speak  - Encourage communication and provide alternate methods of communication as needed  - Collaborate with case management/ for discharge needs  - Include patient/family/caregiver in decisions related to communication  Outcome: Progressing     Problem: Potential for Aspiration  Goal: Non-ventilated patient's risk of aspiration is minimized  Description: Assess and monitor vital signs, respiratory status, and labs (WBC)  Monitor for signs of aspiration (tachypnea, cough, rales, wheezing, cyanosis, fever)      - Assess and monitor patient's ability to swallow  - Place patient up in chair to eat if possible  - HOB up at 90 degrees to eat if unable to get patient up into chair   - Supervise patient during oral intake  - Instruct patient/ family to take small bites  - Instruct patient/ family to take small single sips when taking liquids  - Follow patient-specific strategies generated by speech pathologist   Outcome: Progressing     Problem: Nutrition  Goal: Nutrition/Hydration status is improving  Description: Monitor and assess patient's nutrition/hydration status for malnutrition (ex- brittle hair, bruises, dry skin, pale skin and conjunctiva, muscle wasting, smooth red tongue, and disorientation)  Collaborate with interdisciplinary team and initiate plan and interventions as ordered  Monitor patient's weight and dietary intake as ordered or per policy  Utilize nutrition screening tool and intervene per policy  Determine patient's food preferences and provide high-protein, high-caloric foods as appropriate  - Assist patient with eating   - Allow adequate time for meals   - Encourage patient to take dietary supplement as ordered  - Collaborate with clinical nutritionist   - Include patient/family/caregiver in decisions related to nutrition  Outcome: Progressing     Problem: Nutrition/Hydration-ADULT  Goal: Nutrient/Hydration intake appropriate for improving, restoring or maintaining nutritional needs  Description: Monitor and assess patient's nutrition/hydration status for malnutrition  Collaborate with interdisciplinary team and initiate plan and interventions as ordered  Monitor patient's weight and dietary intake as ordered or per policy  Utilize nutrition screening tool and intervene as necessary  Determine patient's food preferences and provide high-protein, high-caloric foods as appropriate       INTERVENTIONS:  - Monitor oral intake, urinary output, labs, and treatment plans  - Assess nutrition and hydration status and recommend course of action  - Evaluate amount of meals eaten  - Assist patient with eating if necessary   - Allow adequate time for meals  - Recommend/ encourage appropriate diets, oral nutritional supplements, and vitamin/mineral supplements  - Order, calculate, and assess calorie counts as needed  - Assess need for intravenous fluids  - Provide specific nutrition/hydration education as appropriate  - Include patient/family/caregiver in decisions related to nutrition  Outcome: Progressing     Problem: Potential for Falls  Goal: Patient will remain free of fa  Description: INTERVENTIONS:  - Educate patient/family on patient safety including physical limitations  - Instruct patient to call for assistance with activity   - Consult OT/PT to assist with strengthening/mobility   - Keep Call bell within reach  - Keep bed low and locked with side rails adjusted as appropriate  - Keep care items and personal belongings within reach  - Initiate and maintain comfort rounds  - Make Fall Risk Sign visible to staff  - Offer Toileting evdvance of need  - Initiate/Maintain **  - Obtain necessary fall risk management equip  - Apply yellow socks and bracelet for high fall risk patients  - Consider moving patient to room near nurses station  Outcome: Progressing     Problem: Prexisting or High Potential for Compromised Skin Integrity  Goal: Skin integrity is maintained or improved  Description: INTERVENTIONS:  - Identify patients at risk for skin breakdown  - Assess and monitor skin integrity  - Assess and monitor nutrition and hydration status  - Monitor labs   - Assess for incontinence   - Turn and reposition patient  - Assist with mobility/ambulation  - Relieve pressure over bony prominences  - Avoid friction and shearing  - Provide appropriate hygiene as needed including keeping skin clean and dry  - Evaluate need for skin moisturizer/barrier cream  - Collaborate with interdisciplinary team   - Patient/family teaching  - Consider wound care consult Outcome: Progressing

## 2022-03-18 NOTE — ASSESSMENT & PLAN NOTE
· S/p Anterior cervical discectomy and fixation fusion C5/6 and C6/7; Posterior cervical decompression and instrumented fusion C3-T1, 3/11/2022  Plan  · Neurosurgery following  · On Decadron taper  · PT/OT  · PMR consult for possible ARC placement-->cleared for ARC

## 2022-03-18 NOTE — PROGRESS NOTES
Spiritual Care Progress Note    3/18/2022  Patient: Chapo Jason : 1950  Admission Date & Time: 3/5/2022 2018  MRN: [id number] CSN: [id number]       provided introductory visit to patient at bedside  Patient discussed his experience of being in the hospital and identified his family as a source of support for him  When speaking of his wife and her medical journey patient was sad but said that it is important for him to keep a positive outlook and that he tries to remember that 'it could always be worse ' Patient was in good spirits and reported feeling much better than when he was admitted   provided active listening, empathetic presence, and facilitated story telling  Patient expressed gratitude for 's presence  Spiritual Care will remain available                 Chaplaincy Interventions Utilized:   Empowerment: Clarified, confirmed, or reviewed information from treatment team , Encouraged self-care and Normalized experience of patient/family    Exploration: Explored alternatives, Explored emotional needs & resources, Explored relational needs & resources, Explored spiritual needs & resources and Facilitated story telling    Relationship Building: Cultivated a relationship of care and support, Listened empathically and Hospitality      Chaplaincy Outcomes Achieved:  Expressed gratitude, Expressed humor and Expressed intermediate hope       22 1200   Clinical Encounter Type   Visited With Patient   Routine Visit Introduction

## 2022-03-18 NOTE — DISCHARGE SUMMARY
[facility]  Discharge Summary Note - Kalyn Lemos 1950, 71 y o  male MRN: [id number]  Unit/Bed#: -01 Encounter: [id number]  Primary Care Provider: Tea Silverman MD   Date and time admitted to hospital: 3/5/2022  8:18 PM    * Cervical myelopathy [facility])  Assessment & Plan  · S/p Anterior cervical discectomy and fixation fusion C5/6 and C6/7; Posterior cervical decompression and instrumented fusion C3-T1, 3/11/2022  Plan  · Neurosurgery following  · On Decadron taper  · PT/OT  · PMR consult for possible ARC placement-->cleared for ARC    Paroxysmal A-fib [facility])  Assessment & Plan  · On Coumadin at home--> 5mg W/Sa, 2 5mg all other days (17 5mg/week)  · Continue heart rate control medications  · POD 6  · Daily INR while bridging to warfarin with heparin gtt (goal 2-3)--> started 3/14     Muscle spasm  Assessment & Plan  · Complaining of muscle spasm of left shoulder due to the collar  Plan  · Trial of flexiril    Leukocytosis  Assessment & Plan  · Afebrile and hemodynamically stable  · Likely due to steroid use  · Continue to monitor    Goals of care, counseling/discussion  Assessment & Plan  Discussed code status with patient and his spouse who was present on the phone during the time of the conversation  Patient continues to stay that he wishes to be DNR DNI  He understands that the DNI order will need to be rescinded prior to procedure if he requires intubation and reinstated after the procedure  He agrees and also does his wife  Sinus bradycardia  Assessment & Plan  · Sinus bradycardia on EKG  · Patient asymptomatic and stable  · Cardiology consulted and signed off    Mixed hyperlipidemia  Assessment & Plan  · Continue statin    Factor V Leiden mutation ([facility] [address] )  Assessment & Plan  · On Coumadin outpatient  · As above    WILL (obstructive sleep apnea)  Assessment & Plan  · Continue CPAP at night      Essential hypertension  Assessment & Plan  · Uncontrolled, likely due to steroid use; improved with addition of hydralazine and increased amlodipine--> after steroid taper, take off hydralazine because it is it is not first-line--> if still hypertensive after end of steroid taper, consider adding on ACEI/ARB  · Continue home blood pressure meds       Discharging Physician / Practitioner: Clifford Myles DO  PCP: Dario Becker MD  Admission Date:   Admission Orders (From admission, onward)     Ordered        03/05/22 2019  Inpatient Admission  Once                      Discharge Date: 03/18/22    Medical Problems             Resolved Problems  Date Reviewed: 3/18/2022    None                Consultations During Hospital Stay:  · Neurology  · Neurosurgery  · PMR    Procedures Performed:   · Anterior cervical diskectomy and fixation fusion C5/6 and C6/7; posterior cervical decompression and instrumented fusion C3-T1    Significant Findings / Test Results:   · Severe canal stenosis C5-6 with small area of signal abnormality    Incidental Findings:   · N/A    Test Results Pending at Discharge (will require follow up): · None     Outpatient Tests Requested:  · None    Complications:  None    Reason for Admission:  Significant decline in ambulation and numbness and weakness on the right side    Hospital Course:     Verna Cantrell is a 71 y o  male patient who originally presented to the hospital on 3/5/2022 due to right-sided weakness and ambulatory dysfunction a progressively worsened over the past week  MRI of the cervical spine revealed severe cervical canal stenosis  He was transferred from [facility] to [facility] for neurosurgical evaluation and intervention  His anticoagulation was held for the appropriate amount of time and underwent procedure without complication on [id number]  Postop, he was started back on heparin drip and began process of warfarin bridge 3/14  He was evaluated by PMR for acute rehab evaluation and was approved    Insurance Auth was sent out and approved on 03/18/2022  He was transferred to [facility]        Please see above list of diagnoses and related plan for additional information  Condition at Discharge: good       Discharge instructions/Information to patient and family:   See after visit summary for information provided to patient and family  Provisions for Follow-Up Care:  See after visit summary for information related to follow-up care and any pertinent home health orders  Disposition:     Acute Rehab at [facility]    For Discharges to [address] [facility]:   · Not Applicable to this Patient - Not Applicable to this Patient    Planned Readmission: None     Discharge Statement:  I spent 50 minutes discharging the patient  This time was spent on the day of discharge  I had direct contact with the patient on the day of discharge  Greater than 50% of the total time was spent examining patient, answering all patient questions, arranging and discussing plan of care with patient as well as directly providing post-discharge instructions  Additional time then spent on discharge activities  Discharge Medications:  See after visit summary for reconciled discharge medications provided to patient and family        ** Please Note: This note has been constructed using a voice recognition system **

## 2022-03-18 NOTE — PLAN OF CARE
Problem: MOBILITY - ADULT  Goal: Maintain or return to baseline ADL function  Description: INTERVENTIONS:  -  Assess patient's ability to carry out ADLs; assess patient's baseline for ADL function and identify physical deficits which impact ability to perform ADLs (bathing, care of mouth/teeth, toileting, grooming, dressing, etc )  - Assess/evaluate cause of self-care deficits   - Assess range of motion  - Assess patient's mobility; develop plan if impaired  - Assess patient's need for assistive devices and provide as appropriate  - Encourage maximum independence but intervene and supervise when necessary  - Involve family in performance of ADLs  - Assess for home care needs following discharge   - Consider OT consult to assist with ADL evaluation and planning for discharge  - Provide patient education as appropriate  Outcome: Progressing  Goal: Maintains/Returns to pre admission functional level  Description: INTERVENTIONS:  - Perform BMAT or MOVE assessment daily    - Set and communicate daily mobility goal to care team and patient/family/caregiver     - Collaborate with rehabilitation services on mobility goals if consulted  - Dangle patient 3 times a day  - Stand patient 3 times a day  - Ambulate patient 3 times a day  - Out of bed to chair 3 times a day   - Out of bed for meals 3 times a day  - Out of bed for toileting  - Record patient progress and toleration of activity level   Outcome: Progressing     Problem: PAIN - ADULT  Goal: Verbalizes/displays adequate comfort level or baseline comfort level  Description: Interventions:  - Encourage patient to monitor pain and request assistance  - Assess pain using appropriate pain scale  - Administer analgesics based on type and severity of pain and evaluate response  - Implement non-pharmacological measures as appropriate and evaluate response  - Notify physician/advanced practitioner if interventions unsuccessful or patient reports new pain  Outcome: Progressing Problem: INFECTION - ADULT  Goal: Absence or prevention of progression during hospitalization  Description: INTERVENTIONS:  - Assess and monitor for signs and symptoms of infection  - Monitor lab/diagnostic results  - Monitor all insertion sites, i e  indwelling lines, tubes, and drains  - Simpson appropriate cooling/warming therapies per order  - Administer medications as ordered  - Instruct and encourage patient and family to use good hand hygiene technique  - Identify and instruct in appropriate isolation precautions for identified infection/condition  Outcome: Progressing     Problem: SAFETY ADULT  Goal: Maintain or return to baseline ADL function  Description: INTERVENTIONS:  -  Assess patient's ability to carry out ADLs; assess patient's baseline for ADL function and identify physical deficits which impact ability to perform ADLs (bathing, care of mouth/teeth, toileting, grooming, dressing, etc )  - Assess/evaluate cause of self-care deficits   - Assess range of motion  - Assess patient's mobility; develop plan if impaired  - Assess patient's need for assistive devices and provide as appropriate  - Encourage maximum independence but intervene and supervise when necessary  - Involve family in performance of ADLs  - Assess for home care needs following discharge   - Consider OT consult to assist with ADL evaluation and planning for discharge  - Provide patient education as appropriate  Outcome: Progressing  Goal: Maintains/Returns to pre admission functional level  Description: INTERVENTIONS:  - Perform BMAT or MOVE assessment daily    - Set and communicate daily mobility goal to care team and patient/family/caregiver     - Collaborate with rehabilitation services on mobility goals if consulted  - Dangle patient 3 times a day  - Stand patient 3 times a day  - Ambulate patient 3 times a day  - Out of bed to chair 3 times a day   - Out of bed for meals 3 times a day  - Out of bed for toileting  - Record patient progress and toleration of activity level   Outcome: Progressing  Goal: Patient will remain free of falls  Description: INTERVENTIONS:  - Educate patient/family on patient safety including physical limitations  - Instruct patient to call for assistance with activity   - Consult OT/PT to assist with strengthening/mobility   - Keep Call bell within reach  - Keep bed low and locked with side rails adjusted as appropriate  - Keep care items and personal belongings within reach  - Initiate and maintain comfort rounds  - Make Fall Risk Sign visible to staff  - Offer Toileting every 2 Hours, in advance of need  - Initiate/Maintain bed alarm  - Obtain necessary fall risk management equipment  - Apply yellow socks and bracelet for high fall risk patients  - Consider moving patient to room near nurses station  Outcome: Progressing     Problem: DISCHARGE PLANNING  Goal: Discharge to home or other facility with appropriate resources  Description: INTERVENTIONS:  - Identify barriers to discharge w/patient and caregiver  - Arrange for needed discharge resources and transportation as appropriate  - Identify discharge learning needs (meds, wound care, etc )  - Refer to Case Management Department for coordinating discharge planning if the patient needs post-hospital services based on physician/advanced practitioner order or complex needs related to functional status, cognitive ability, or social support system  Outcome: Progressing     Problem: Knowledge Deficit  Goal: Patient/family/caregiver demonstrates understanding of disease process, treatment plan, medications, and discharge instructions  Description: Complete learning assessment and assess knowledge base    Interventions:  - Provide teaching at level of understanding  - Provide teaching via preferred learning methods  Outcome: Progressing     Problem: Neurological Deficit  Goal: Neurological status is stable or improving  Description: Interventions:  - Monitor and assess patient's level of consciousness, motor function, sensory function, and level of assistance needed for ADLs  - Monitor and report changes from baseline  Collaborate with interdisciplinary team to initiate plan and implement interventions as ordered  - Provide and maintain a safe environment  - Consider seizure precautions  - Consider fall precautions  - Consider aspiration precautions  - Consider bleeding precautions  Outcome: Progressing     Problem: Activity Intolerance/Impaired Mobility  Goal: Mobility/activity is maintained at optimum level for patient  Description: Interventions:  - Assess and monitor patient  barriers to mobility and need for assistive/adaptive devices  - Assess patient's emotional response to limitations  - Collaborate with interdisciplinary team and initiate plans and interventions as ordered  - Encourage independent activity per ability   - Maintain proper body alignment  - Perform active/passive rom as tolerated/ordered  - Plan activities to conserve energy   - Turn patient as appropriate  Outcome: Progressing     Problem: Communication Impairment  Goal: Ability to express needs and understand communication  Description: Assess patient's communication skills and ability to understand information  Patient will demonstrate use of effective communication techniques, alternative methods of communication and understanding even if not able to speak  - Encourage communication and provide alternate methods of communication as needed  - Collaborate with case management/ for discharge needs  - Include patient/family/caregiver in decisions related to communication  Outcome: Progressing     Problem: Potential for Aspiration  Goal: Non-ventilated patient's risk of aspiration is minimized  Description: Assess and monitor vital signs, respiratory status, and labs (WBC)  Monitor for signs of aspiration (tachypnea, cough, rales, wheezing, cyanosis, fever)      - Assess and monitor patient's ability to swallow  - Place patient up in chair to eat if possible  - HOB up at 90 degrees to eat if unable to get patient up into chair   - Supervise patient during oral intake  - Instruct patient/ family to take small bites  - Instruct patient/ family to take small single sips when taking liquids  - Follow patient-specific strategies generated by speech pathologist   Outcome: Progressing     Problem: Nutrition  Goal: Nutrition/Hydration status is improving  Description: Monitor and assess patient's nutrition/hydration status for malnutrition (ex- brittle hair, bruises, dry skin, pale skin and conjunctiva, muscle wasting, smooth red tongue, and disorientation)  Collaborate with interdisciplinary team and initiate plan and interventions as ordered  Monitor patient's weight and dietary intake as ordered or per policy  Utilize nutrition screening tool and intervene per policy  Determine patient's food preferences and provide high-protein, high-caloric foods as appropriate  - Assist patient with eating   - Allow adequate time for meals   - Encourage patient to take dietary supplement as ordered  - Collaborate with clinical nutritionist   - Include patient/family/caregiver in decisions related to nutrition  Outcome: Progressing     Problem: Nutrition/Hydration-ADULT  Goal: Nutrient/Hydration intake appropriate for improving, restoring or maintaining nutritional needs  Description: Monitor and assess patient's nutrition/hydration status for malnutrition  Collaborate with interdisciplinary team and initiate plan and interventions as ordered  Monitor patient's weight and dietary intake as ordered or per policy  Utilize nutrition screening tool and intervene as necessary  Determine patient's food preferences and provide high-protein, high-caloric foods as appropriate       INTERVENTIONS:  - Monitor oral intake, urinary output, labs, and treatment plans  - Assess nutrition and hydration status and recommend course of action  - Evaluate amount of meals eaten  - Assist patient with eating if necessary   - Allow adequate time for meals  - Recommend/ encourage appropriate diets, oral nutritional supplements, and vitamin/mineral supplements  - Order, calculate, and assess calorie counts as needed  - Assess need for intravenous fluids  - Provide specific nutrition/hydration education as appropriate  - Include patient/family/caregiver in decisions related to nutrition  Outcome: Progressing     Problem: Potential for Falls  Goal: Patient will remain free of falls  Description: INTERVENTIONS:  - Educate patient/family on patient safety including physical limitations  - Instruct patient to call for assistance with activity   - Consult OT/PT to assist with strengthening/mobility   - Keep Call bell within reach  - Keep bed low and locked with side rails adjusted as appropriate  - Keep care items and personal belongings within reach  - Initiate and maintain comfort rounds  - Make Fall Risk Sign visible to staff  - Offer Toileting every 3 Hours, in advance of need  - Initiate/Maintain chair alarm  - Obtain necessary fall risk management equipment,   - Apply yellow socks and bracelet for high fall risk patients  - Consider moving patient to room near nurses station  Outcome: Progressing     Problem: Prexisting or High Potential for Compromised Skin Integrity  Goal: Skin integrity is maintained or improved  Description: INTERVENTIONS:  - Identify patients at risk for skin breakdown  - Assess and monitor skin integrity  - Assess and monitor nutrition and hydration status  - Monitor labs   - Assess for incontinence   - Turn and reposition patient  - Assist with mobility/ambulation  - Relieve pressure over bony prominences  - Avoid friction and shearing  - Provide appropriate hygiene as needed including keeping skin clean and dry  - Evaluate need for skin moisturizer/barrier cream  - Collaborate with interdisciplinary team   - Patient/family teaching  - Consider wound care consult   Outcome: Progressing

## 2022-03-18 NOTE — PROGRESS NOTES
PHYSICAL MEDICINE AND REHABILITATION   PREADMISSION ASSESSMENT     Projected [facility] Diagnoses:  Impairment of mobility, safety and Activities of Daily Living (ADLs) due to Spinal Cord Dysfunction:  Non-Traumatic:  04 130 Other Non-Traumatic     Etiologic: Progressive Cervical Spinal Stenosis with Myelopathy  Date of Onset: 3/11/2022   Date of surgery: 3/11/2022 Anterior cervical discectomy and fixation fusion C5/6 and C6/7; Posterior cervical decompression and instrumented fusion C3-T1    PATIENT INFORMATION  Name: Ty Gurrola Phone #: [phone] (home)   Address: [address] [place] [id number]  YOB: 1950 Age: 71 y o  SS#   Marital Status: /Civil Union  Ethnicity: , Non  or   Employment Status: retired  Extended Emergency Contact Information  Primary Emergency Contact: [employer]  Address: [address] [place] [address], [address] Phone: [phone]  Mobile Phone: [phone]  Relation: Spouse  Advance Directive: Code Level 3  Has ACP Docs    INSURANCE/COVERAGE:     Primary Payor: BLUE CROSS  REP / Plan: [place] [address] FOCUS  REP / Product Type: Medicare HMO /   Secondary Payer: Self Pay   Payer Contact:  Payer Contact:   Contact Phone: [phone] Contact Phone:     Authorization #: [id number]  Coverage Dates: 3/18-22 - 3/22/22  LCD: 3/22/22  Benefits: Patient has a $ 250 Co-pay for days 1-7 for each admission then has an [employer] Max of $7,550 Per Calendar Year  To this date patient has met $593 42 of Max Out Of Pocket amount  3/18/2022: Reviewed benefits with patient who is agreeable     MEDICARE #: NA  Medicare Days: NA  Medical Record #: [id number]    REFERRAL SOURCE:   Referring provider: Rebeca Rivas MD  Referring facility: [address] [place] [address]  Room: [facility]  PCP: Shira Miller MD PCP phone number: [phone]    MEDICAL INFORMATION  HPI:   On 3/4/2022 Patient presented to [place] [address] Emergency Room with complaints of B/L LE weakness, right sided neck stiffness, right shoulder pain, numbness to right hand, decreased right hand , unsteadiness, has had multiple falls, unsteady gait, denies hitting his head or LOC and new urinary incontinence  CT cervical spine imaging revealed multilevel degenerative changes causing multilevel neural foraminal and spinal canal stenosis as described and suspect asymmetric soft tissue in the right paraspinal region  CTA and XR chest were negative  Neurology was consulted and recommendation was made to transfer patient to [facility] given rather quick decline for neurosurgical evaluation and potentially surgical intervention  Patient was transferred to [facility]  Neurosurgery evaluated patient  MRI C spine revealed C5-6 severe spinal stenosis  Normal cervical lordosis  Degenerative changes throughout the cervical spine  Multiple levels of foraminal stenosis  At C3-4 there is mild to moderate central canal stenosis without cord signal change  At C5-6 there is moderate to severe central canal stenosis with possible mild cord signal change  Patient was started on Decadron IV  Due to patients history of Factor 5 Leiden it was determined patient needed medical optimization and medical clearance  Cardiology was consulted and and patient was started on a Heparin infusion  On 3/11/2022 Patient went to the OR for an anterior cervical discectomy and fixation fusion C5/6 and C6/7; Posterior cervical decompression and instrumented fusion C3-T1, EBL 200ml  Post operatively [place] collar to remain in place at all times, Margarette collar for showering  Patient is hemodynamically stable at this time  PT/OT therapies were consulted and continue to follow patient at this time  PM&R was consulted and are recommending inpatient Acute Rehab when medically stable   All involved medical disciplines feel/agree patient is medically ready for discharge at this time  Inpatient acute rehabilitation physician was consulted  Upon review of patients case and correspondence with PT/OT therapies and PM&R services, [facility] physician feels Shelley Vanessa will benefit and is a good candidate / appropriate for inpatient acute rehab at this time  He has demonstrated the willingness / desire and tolerance to participate in the required 3 hours or more of therapies per day  Covid Vaccines: Georgi Hernandez 3/2/2022 3/31/22  Covid Test:  Negative      Past Medical History:   Past Surgical History: Allergies:     Past Medical History:   Diagnosis Date    Acute venous embolism and thrombosis of deep vessels of proximal lower extremity (HCC)     Last assessed: 5/18/15    Arthritis     Cellulitis     LE    CPAP (continuous positive airway pressure) dependence     Factor V Leiden ([address] )     Forgetfulness     [place] De[place] (hard of hearing)     Paroxysmal atrial fibrillation ([address] )     Last assessed: 11/2/15    Sleep apnea     Past Surgical History:   Procedure Laterality Date    BACK SURGERY      Lower    COLON SURGERY      COLONOSCOPY      [place] ARTHRODESIS ANT INTERBODY MIN DISCECTOMY, CERVICAL BELOW C2 N/A 3/11/2022    Procedure: Anterior cervical discectomy and fixation fusion C5/6 and C6/7; Posterior cervical decompression and instrumented fusion C3-T1; Surgeon: Taiwo Robledo MD;  Location: [facility];  Service: Neurosurgery     Allergies   Allergen Reactions    Morphine GI Intolerance    Vitamin K Other (See Comments)     On coumadin-patient sensitive to vitamin K amounts in meds etc          Medical/functional conditions requiring inpatient rehabilitation: Factor 5 Leiden, Heparin to Coumadin bridge, Steroid taper, Diabetes, Pain management, Atrial Fibrillation, Sinus Bradycardia, Hypertension  Decreased: Strength, Endurance, Balance, ROM and Coordination       Risk for medical/clinical complications: Hyper/hypotensive episodes, Uncontrolled Pain, Wound Dehiscence, Infection, Falls, DVT/PE, Uncontrolled blood sugars    Comorbidities/Surgeries in the last 100 days: Factor V Leiden, Atrial Bibrillation, B/L Knee Osteoarthritis, WILL and DVT ppx: Heparin drip    CURRENT VITAL SIGNS:   Temp:  [97 6 °F (36 4 °C)-98 1 °F (36 7 °C)] 98 1 °F (36 7 °C)  HR:  [49-56] 56  Resp:  [17] 17  BP: (134-168)/(54-82) 134/54   Intake/Output Summary (Last 24 hours) at 3/18/2022 1622  Last data filed at 3/18/2022 1301  Gross per 24 hour   Intake 360 ml   Output 900 ml   Net -540 ml        LABORATORY RESULTS:      Lab Results   Component Value Date    HGB 12 6 03/18/2022    HGB 12 9 01/23/2015    HCT 37 6 03/18/2022    HCT 39 5 01/23/2015    WBC 13 18 (H) 03/18/2022    WBC 8 43 01/23/2015     Lab Results   Component Value Date    BUN 16 03/18/2022    BUN 17 11/02/2021     10/25/2017    K 3 7 03/18/2022    K 4 5 11/02/2021     03/18/2022    CL 99 11/02/2021    GLUCOSE 102 (H) 10/25/2017    CREATININE 0 60 03/18/2022    CREATININE 1 24 10/25/2017     Lab Results   Component Value Date    PROTIME 20 7 (H) 03/18/2022    PROTIME 22 2 (H) 01/11/2018    INR 1 87 (H) 03/18/2022    INR 2 2 (H) 01/11/2018        DIAGNOSTIC STUDIES:  MRI brain wo contrast    Result Date: 3/5/2022  Impression: No acute intracranial pathology  Mild chronic microangiopathy  Workstation performed: [id number]     MRI cervical spine w wo contrast    Result Date: 3/5/2022  Impression: No acute abnormality  Motion limited    Multilevel degenerative spondylosis progressed from 2013 most severe at C5-6 with severe canal stenosis and flattening of the cord  Small focus of T2 signal within the cord at C5-C6 may represent myelomalacia  Moderate canal stenosis at C3-4 mildly indents the cord without signal abnormality  Marked multilevel foraminal stenosis  No prevertebral edema or mass   Workstation performed: [id number]       PRECAUTIONS/SPECIAL NEEDS:  Tobacco:   Social History Tobacco Use   Smoking Status Never Smoker   Smokeless Tobacco Never Used   , Alcohol:    Social History     Substance and Sexual Activity   Alcohol Use Not Currently   , Splints/Braces: Cervical Collar, Anticoagulation:  heparin, Edema Management, Safety Concerns, Pain Management and Hypoglycemia Protocol      MEDICATIONS:     Current Facility-Administered Medications:     acetaminophen (TYLENOL) tablet 975 mg, 975 mg, Oral, Q8H [address], [place] Segal PA-C, 975 mg at 03/18/22 1343    amLODIPine (NORVASC) tablet 5 mg, 5 mg, Oral, BID, Geraldo Sarmiento MD, 5 mg at 03/18/22 6099    atorvastatin (LIPITOR) tablet 40 mg, 40 mg, Oral, QPM, Montana Segal PA-C, 40 mg at 03/17/22 1722    calcium carbonate (TUMS) chewable tablet 1,000 mg, 1,000 mg, Oral, Daily PRN, Montana Segal PA-C    cyclobenzaprine (FLEXERIL) tablet 5 mg, 5 mg, Oral, TID PRN, Caridad Zepeda MD    [COMPLETED] dexamethasone (DECADRON) tablet 4 mg, 4 mg, Oral, Q8H [address], 4 mg at 03/13/22 1420 **FOLLOWED BY** [COMPLETED] dexamethasone (DECADRON) tablet 2 mg, 2 mg, Oral, Q8H VARGAS, 2 mg at 03/15/22 1437 **FOLLOWED BY** [COMPLETED] dexamethasone (DECADRON) tablet 2 mg, 2 mg, Oral, Q12H [address], 2 mg at 03/17/22 0915 **FOLLOWED BY** dexamethasone (DECADRON) tablet 2 mg, 2 mg, Oral, Daily, VEENA Rico-C, 2 mg at 03/18/22 1068    Diclofenac Sodium (VOLTAREN) 1 % topical gel 2 g, 2 g, Topical, 4x Daily, Darnell Peavine, DO    docusate sodium (COLACE) capsule 100 mg, 100 mg, Oral, BID, VEENA Rico-ALTAGRACIA, 100 mg at 03/18/22 0810    DULoxetine (CYMBALTA) delayed release capsule 60 mg, 60 mg, Oral, HS, VEENA Rico-C, 60 mg at 03/17/22 2231    flecainide (TAMBOCOR) tablet 100 mg, 100 mg, Oral, BID, Montana Segal PA-C, 100 mg at 03/18/22 1054    heparin (porcine) 25,000 Units in sodium chloride 0 45 % 250 mL infusion, 3-30 Units/kg/hr (Order-Specific), Intravenous, Titrated, Caridad Zepeda MD, Last Rate: 13 2 mL/hr at 03/18/22 1526, 12 Units/kg/hr at 03/18/22 1526    heparin (porcine) injection 4,400 Units, 4,400 Units, Intravenous, Q1H PRN, Amador Rutherford MD, 1,330 Units at 03/18/22 1516    heparin (porcine) injection 8,800 Units, 8,800 Units, Intravenous, Q1H PRN, Amador Rutherford MD    hydrALAZINE (APRESOLINE) injection 10 mg, 10 mg, Intravenous, Q8H PRN, Amador Rutherford MD, 10 mg at 03/12/22 1625    hydrALAZINE (APRESOLINE) tablet 50 mg, 50 mg, Oral, Q8H [address], Amador Rutherford MD, 50 mg at 03/18/22 1344    HYDROmorphone HCl (DILAUDID) injection 0 2 mg, 0 2 mg, Intravenous, Q1H PRN, Ching Christie PA-C    insulin lispro (HumaLOG) 100 units/mL subcutaneous injection 1-5 Units, 1-5 Units, Subcutaneous, HS, Ching Christie PA-C, 1 Units at 03/11/22 2206    insulin lispro (HumaLOG) 100 units/mL subcutaneous injection 2-12 Units, 2-12 Units, Subcutaneous, 4 times day, 2 Units at 03/18/22 1354 **AND** Fingerstick Glucose (POCT), , , 4 times day, Ching Christie PA-C    methocarbamol (ROBAXIN) tablet 500 mg, 500 mg, Oral, Q6H [address], Ching Christie PA-C, 500 mg at 03/18/22 1343    metoprolol succinate (TOPROL-XL) 24 hr tablet 100 mg, 100 mg, Oral, Daily, Ching Christie PA-C, 100 mg at 03/18/22 0810    ondansetron (ZOFRAN) injection 4 mg, 4 mg, Intravenous, Q6H PRN, Ching Christie PA-C    oxyCODONE (ROXICODONE) IR tablet 2 5 mg, 2 5 mg, Oral, Q4H PRN, Ching Christie PA-C    oxyCODONE (ROXICODONE) IR tablet 5 mg, 5 mg, Oral, Q4H PRN, Ching Christie PA-C, 5 mg at 03/13/22 0345    pantoprazole (PROTONIX) EC tablet 40 mg, 40 mg, Oral, Early Morning, Ching Christie PA-C, 40 mg at 03/18/22 8247    senna (SENOKOT) tablet 8 6 mg, 1 tablet, Oral, Daily, Ching Christie PA-C, 8 6 mg at 03/18/22 4527    warfarin (COUMADIN) tablet 2 5 mg, 2 5 mg, Oral, Once (warfarin), Campos Tyler DO    SKIN INTEGRITY:   no rashes, no erythema, no peripheral edema, Incision: healing well, Juve Pin Sites x 3, CARMEN, Anterior and Posterior Cervical Incisions  PRIOR LEVEL OF FUNCTION:  He lives in a(n) single family home  Sosa Myers is  and lives with their spouse  Self Care: Independent, Indoor Mobility: Independent and Cognition: Independent    FALLS IN THE LAST 6 MONTHS: Multiple    HOME ENVIRONMENT:  The living area: can live on one level and 1/2 bath on mail level, can have a first floor set up  There are 5 steps to enter the home  The patient will not have 24 hour supervision/physical assistance available upon discharge  PREVIOUS DME:  Equipment in home (previous DME): Single Point Cane    FUNCTIONAL STATUS:  Physical Therapy Occupational Therapy Speech Therapy     03/18/22 0946    PT Last Visit   PT Visit Date 03/18/22   Note Type   Note Type Treatment for insurance authorization   End of Consult   Patient Position at End of Consult All needs within reach; Bedside chair   Pain Assessment   Pain Assessment Tool 0-10   Pain Score 2   Pain Location/Orientation Location: Neck   Pain Radiating Towards shoulders   Pain Onset/Description Onset: Ongoing  (stiffness)   Hospital Pain Intervention(s) Relaxation technique;Repositioned; Ambulation/increased activity   Restrictions/Precautions   Weight Bearing Precautions Per Order No   Braces or Orthoses C/S Collar   Other Precautions Multiple lines; Fall Risk;Pain;Spinal precautions   General   Chart Reviewed Yes   Response to Previous Treatment Patient with no complaints from previous session     Family/Caregiver Present No   Cognition   Overall Cognitive Status WFL   Arousal/Participation Alert   Attention Within functional limits   Orientation Level Oriented X4   Memory Within functional limits   Following Commands Follows all commands and directions without difficulty   Subjective   Subjective pt reports eager to get to rehab   Bed Mobility   Supine to Sit 4  Minimal assistance  (from flat surface)   Sit to Supine 5  Supervision  (needs HOB elevated for comfort )   Additional Comments inc time and cues for spinal precations    Transfers   Sit to Stand 5  Supervision  (CGA from lower surfaces w/o arm rsts)   Stand to Sit 4  Minimal assistance  (CGA to lower surfaces w/o arm rsts )   Ambulation/Elevation   Gait pattern Excessively slow   Gait Assistance 4  Minimal assist  (CGA w/ RW- Milly w/o AD)   Assistive Device Rolling walker   Distance 60+70 w/ RW- postural degrations w/ inc distances adn fatigue  increased distances causing reports of R calf "cramping"- but w/o LOB; min/ HHA trial 30' x1- unstead gait w/ high guard arm position-- short shuffling steps w/ pt reports very fearful of falling    Balance   Static Sitting Good   Dynamic Sitting Fair +   Static Standing Fair -   Dynamic Standing Poor +   Ambulatory Poor +   Endurance Deficit   Endurance Deficit Yes   Endurance Deficit Description weakness ; fatigue and increased R calf "cramping" w/ increased distances"    Activity Tolerance   Activity Tolerance Patient limited by fatigue;Patient tolerated treatment well   Medical Staff Made Aware care coordination w/ OT/ CM RN    Nurse Made Aware yes- cleared for session    Exercises   Hip Flexion Standing;10 reps;Right;Left  (w/ HHA)   Hip Abduction Standing;10 reps;Right;Left  (w/ HHA)   Balance training  STS from lower seat surfaces w/ min> CGA (no armrests)- easily fatgiues    Assessment   Prognosis Excellent   Problem List Decreased strength;Decreased range of motion;Decreased endurance; Impaired balance;Decreased mobility; Decreased coordination;Decreased skin integrity;Orthopedic restrictions;Pain   Assessment Pt seen for PT session for gait and transfers training; progression/ trial of gait w/ HHA; transfers from lower surfaces w/o arm rests; standing therex  Pt does continue to require inc assist w/ transfers from lower surfaces (Milly) w/ cues for technique and forward weight shift over AZRA   Ambulation w/ RW short household distances w/ VC TC for posture and balance especially w/ turns and obstacle negotiation- noted decreased AZRA w/ pt requiring cues for correction  Trial w/ HHA- pt w/ arms in high guard; very fearful of falling  Pt w/ increased R calf "cramping" w/ inc distances and fatgiue  At this time pt continues to function well below baseline; poses inc falls and readmission risk and PT d/c recommendation remain unchanged to comprehensive inpt rehab program  Pt is A+O x4 w/ good safety awareness and  excellent potential to achieve MI/ indep PT goals in a reasonable time frame w/ intensive rehab program  Will continue to follow and progress while in inpt          03/18/22 0915    OT Last Visit   OT Visit Date 03/18/22   Note Type   Note Type Treatment for insurance authorization   Restrictions/Precautions   Weight Bearing Precautions Per Order No   Braces or Orthoses C/S Collar   Other Precautions Multiple lines; Fall Risk;Pain;Spinal precautions   General   Response to Previous Treatment Patient with no complaints from previous session   Lifestyle   Autonomy Pt was I in ADLs, IADLs, and used a cane PTA  Pt A with his wife needs  (+)   Reciprocal Relationships Pt lives with his wife and A with her needs  Pt reports his wife is sick and is unable to A with his needs  Service to Others Pt is retired  Intrinsic Gratification Pt enjoys being with his dog  Pain Assessment   Pain Assessment Tool 0-10   Pain Score 6   ADL   Grooming Assistance 5  Supervision/Setup   Grooming Deficit Setup; Denture care; Teeth care   Grooming Comments Pt completes standing at sink w SUP    UB Bathing Assistance 4  Minimal Assistance   UB Bathing Deficit Setup;Supervision/safety; Increased time to complete;Verbal cueing   UB Bathing Comments Pt requires Milly to complete sitting EOB; pt requires assist to wash back and shoulders    LB Bathing Assistance 3  Moderate Assistance   LB Bathing Deficit Setup;Supervision/safety; Increased time to complete;Verbal cueing   LB Bathing Comments Pt requires modA to complete sitting EOB; pt requires assist for b/l lower legs and feet    UB Dressing Assistance 4  Minimal Assistance   UB Dressing Deficit Setup;Supervision/safety;Verbal cueing; Thread RUE; Increased time to complete; Thread LUE   UB [facility] Comments [facility]    LB Dressing Assistance 3  Moderate Assistance   LB Dressing Deficit Setup; Increased time to complete;Supervision/safety;Verbal cueing; Thread RLE into pants; Thread LLE into pants; Fasteners   LB Dressing Comments Pt requires assist to thread b/l legs through pants and assist w fasteners this date    Toileting Assistance  5  Supervision/Setup   Toileting Deficit Setup   Toileting Comments Pt uses urinal w SUP standing EOB    Bed Mobility   Additional Comments Pt sitting EOB to begin OT treat    Transfers   Sit to Stand 5  Supervision   Additional items Assist x 1; Increased time required;Verbal cues   Stand to Sit 5  Supervision   Additional items Assist x 1; Increased time required;Verbal cues   Additional Comments RW used for transfers    Functional Mobility   Functional Mobility 4  Minimal assistance  (CGA)   Additional Comments CGA, A X1; increased time required, Psychiatric    Additional items Rolling walker   Cognition   Overall Cognitive Status [facility]   Arousal/Participation Alert; Responsive; Cooperative   Attention Within functional limits   Orientation Level Oriented X4   Memory Decreased recall of precautions   Following Commands Follows one step commands with increased time or repetition   Comments Pt very pleasant, cooperative, and willing to participate in OT treat    Activity Tolerance   Activity Tolerance Patient tolerated treatment well   Medical Staff Made Aware RN cleared pt for OT treat   Assessment   Assessment Upon arrival of OT, pt was sitting EOB   Pt participated in skilled OT session this date with interventions consisting of ADL re training with the use of correct body mechnaics, safety awareness and fall prevention techniques and  functional transfer training *   In comparison to previous session, pt demonstrates improvements in activity tolerance and ADL performance  Pt continues to be functioning below baseline level  Pt demonstrated the following tasks: SUP sit to stand and CGA for functional ambulation using a RW  Pt completed the following ADLs this date: UB bathing/dressing w Milly sitting EOB, LB bathing/dressing w modA, toileting w SUP standing EOB using urinal, and dental care standing at sink w RW for support  Occupational performance remains limited secondary to factors listed above and increased risk for falls and injury  From OT standpoint, recommendation at time of d/c would be Short Term Rehab  Patient to benefit from continued Occupational Therapy treatment while in the hospital to address deficits as defined above and maximize level of functional independence with ADLs and functional mobility  Upon completion of OT session pt was left w physical therapist  The patient's raw score on the AM-PAC Daily Activity inpatient short form is 17, standardized score is 37 26, less than 39 4  Patients at this level are likely to benefit from discharge to post-acute rehabilitation services  Please refer to the recommendation of the Occupational Therapist for safe discharge planning             CARE SCORES:  Self Care:  Eatin: Not applicable  Oral hygiene: 04: Supervision or touching  assistance  Toilet hygiene: 05: Setup or clean-up assistance  Shower/bathing self: 09: Not applicable  Upper body dressin: Supervision or touching  assistance  Lower body dressin: Partial/moderate assistance  Putting on/taking off footwear: 09: Not applicable  Transfers:  Roll left and right: 09: Not applicable  Sit to lyin: Supervision or touching  assistance  Lying to sitting on side of bed: 04: Supervision or touching  assistance  Sit to stand: 04: Supervision or touching  assistance  Chair/bed to chair transfer: 04: Supervision or touching  assistance  Toilet transfer: 04: Supervision or touching  assistance  Mobility:  Walk 10 ft: 04: Supervision or touching  assistance  Walk 50 ft with two turns: 09: Not applicable  Walk [id number]: 09: Not applicable    CURRENT GAP IN FUNCTION  Prior to Admission: Functional Status: Patient was independent with mobility/ambulation, transfers, ADL's, IADL's  Estimated length of stay: 7 to 10 days    Anticipated Post-Discharge Disposition/Treatment  Disposition: Return to previous home/apartment  Outpatient Services: Physical Therapy (PT) and Occupational Therapy (OT)    BARRIERS TO DISCHARGE  Weakness, Pain, Diminished cognition/Mentation change, Balance Difficulty, Fatigue, Financial Resources, Equipment Needs and Resource Availability    INTERVENTIONS FOR DISCHARGE  Adaptive equipment, Patient/Family/Caregiver Education, [employer], Medication Changes Per MD orders, Therapy exercises, Center of balance support  and Energy conservation education     REQUIRED THERAPY:  Patient will require PT and OT 90 minutes each per day, five days per week to achieve rehab goals  REQUIRED FUNCTIONAL AND MEDICAL MANAGEMENT FOR INPATIENT REHABILITATION:  Spasticity: The patient's spasticity is  improved, Skin:  There are no pressure sores currently, Pain Management: Overall pain is well controlled, Deep Vein Thrombosis (DVT) Prophylaxis:  heparin and Coumadin with INR goal 2-3, Diabetes Management: continue sliding scale insulin, patient to do finger sticks as ordered, SLIM to continue to manage diabetes, and and further IM management of additional medical conditions while on the ARC, pt needs PT/OT intervention, patient/family education and training, possible Neuropsych and Neurology consults with any other needed consults PRN, nursing medication review and management of bowel/bladder function       RECOMMENDED LEVEL OF CARE:   On 3/4/2022 Patient presented to [place] [address] Emergency Room with complaints of B/L LE weakness, right sided neck stiffness, right shoulder pain, numbness to right hand, decreased right hand , unsteadiness, has had multiple falls, unsteady gait, denies hitting his head or LOC and new urinary incontinence  CT cervical spine imaging revealed multilevel degenerative changes causing multilevel neural foraminal and spinal canal stenosis as described and suspect asymmetric soft tissue in the right paraspinal region  CTA and XR chest were negative  Neurology was consulted and recommendation was made to transfer patient to [facility] [facility] given rather quick decline for neurosurgical evaluation and potentially surgical intervention  Patient was transferred to [place] [address]  Neurosurgery evaluated patient  MRI C spine revealed C5-6 severe spinal stenosis  Normal cervical lordosis  Degenerative changes throughout the cervical spine  Multiple levels of foraminal stenosis  At C3-4 there is mild to moderate central canal stenosis without cord signal change  At C5-6 there is moderate to severe central canal stenosis with possible mild cord signal change  Patient was started on Decadron IV  Due to patients history of Factor 5 Leiden it was determined patient needed medical optimization and medical clearance  Cardiology was consulted and and patient was started on a Heparin infusion  On 3/11/2022 Patient went to the OR for an anterior cervical discectomy and fixation fusion C5/6 and C6/7; Posterior cervical decompression and instrumented fusion C3-T1, EBL 200ml  Post operatively [place] collar to remain in place at all times, [place] collar for showering  Prior to admission patient was independent with  ADLs, IADLs, (+) drove  Pt is currently functioning functioning below his baseline    At this time patient is requiring Supervision/CGA A for transfers and Min A w/o AD and CGA with the use of a RW and Min A for UB bathing, Setup/Supervision for UB dressing, Mod A for LB ADL's  Close medical management and PM&R management is recommended at this time while patient is on the [facility]  Inpatient acute rehab is recommended for patient to maximize overall strength and mobility upon discharge to home with support of family

## 2022-03-18 NOTE — CONSULTS
Internal Medicine Consultation Note    Patient: Maria Ines Connolly  Age/sex: 71 y o  male  Medical Record #: [id number]      ASSESSMENT PLAN    S/p anterior cervical discectomy and fixation fusion C5-6 and C6-7, posterior cervical decompression and instrumented fusion C3-T1  · Aspen collar at all times  · Continue Decadron taper  · Pain control and therapy per PMR  PAF  · INR 1 87  · Pt takes Coumadin 5mg Wed and Sat and 2 5mg Mon, Tues, Thurs and Sun  · DC Heparin drip when INR > 2    HTN  · Has been elevated during hospitalization possible due to steroids  · Continue amlodipine 5mg 2x daily, hydralazine 50mg every 8 hours and Toprol XL 100mg daily  · If BP improves once steroids are completed will decrease hydralazine first   · Monitor BP every shift  Leiden Factor V  · DC Heparin drip when INR > 2   · Coumadin as above  Subjective/ HPI: Patient seen and examined  He is a 78yo male, with HTN, PAF, Leiden Factor V deficiency, obesity and OA, who presented to [facility] 3/4/22 with ambulatory dysfunction and UE paresthesias which had been slowly worsening over one month with more rapid worsening over 1 week  C-spine MRI revealed C5-6 severe spinal canal stenosis  He was transferred to [facility] for neurosurgical intervention  He was started on IV steroids  Coumadin was held and the pt was placed on IV Heparin  He underwent anterior cervical discectomy and fixation fusion C5-6 and C6-7, posterior cervical decompression and instrumented fusion C3-T1 by Dr Ting Avilez 3/11/22  IV heparin was resumed post-op and Coumadin resumed POD 3  He was determined to be stable for transfer to the [facility] 3/18/22  IM consulted for management of a  Fib     ROS:   A 10 point ROS was performed; negative except as noted above       Social History:    Substance Use History:   Social History     Substance and Sexual Activity   Alcohol Use Not Currently     Social History     Tobacco Use   Smoking Status Never Smoker Smokeless Tobacco Never Used     Social History     Substance and Sexual Activity   Drug Use No       Family History:    Family History   Problem Relation Age of Onset    Lung cancer Mother     No Known Problems Father     Heart attack Brother     Atrial fibrillation Brother     Emphysema Sister          Review of Scheduled Meds:  Current Facility-Administered Medications   Medication Dose Route Frequency Provider Last Rate    acetaminophen  975 mg Oral [facility] Donney Severe, MD      amLODIPine  5 mg Oral BID Donney Severe, MD      atorvastatin  40 mg Oral QPM Donney Severe, MD      bisacodyl  10 mg Rectal Daily PRN Donney Severe, MD      calcium carbonate  1,000 mg Oral Daily PRN Donney Severe, MD      cyclobenzaprine  5 mg Oral TID PRN Donney Severe, MD      [START ON 3/19/2022] dexamethasone  2 mg Oral Daily Donney Severe, MD      Diclofenac Sodium  2 g Topical 4x Daily Donney Severe, MD      docusate sodium  100 mg Oral BID Donney Severe, MD      DULoxetine  60 mg Oral HS Donney Severe, MD      flecainide  100 mg Oral BID Donney Severe, MD      heparin (porcine)  3-30 Units/kg/hr (Order-Specific) Intravenous Titrated Donney Severe, MD      heparin (porcine)  4,400 Units Intravenous Q1H PRN Donney Severe, MD      heparin (porcine)  8,800 Units Intravenous Q1H PRN Donney Severe, MD      hydrALAZINE  50 mg Oral [facility] Donney Severe, MD      insulin lispro  1-5 Units Subcutaneous HS Donney Severe, MD      insulin lispro  2-12 Units Subcutaneous 4 times day Donney Severe, MD      methocarbamol  500 mg Oral Q6H [facility] Donney Severe, MD      [START ON 3/19/2022] metoprolol succinate  100 mg Oral Daily Donney Severe, MD      oxyCODONE  2 5 mg Oral Q4H PRN Donney Severe, MD      oxyCODONE  5 mg Oral Q4H PRN Donney Severe, MD      [START ON 3/19/2022] pantoprazole  40 mg Oral Early Morning Donney Severe, MD      polyethylene glycol  17 g Oral Daily PRN Donney Severe, MD      [START ON 3/19/2022] senna  1 tablet Oral Daily Puneet Maurer MD      warfarin  2 5 mg Oral Once (warfarin) Puneet Maurer MD         Labs:     Results from last 7 days   Lab Units 22  1148 22  0539   WBC Thousand/uL 13 18* 11 96*   HEMOGLOBIN g/dL 12 6 13 2   HEMATOCRIT % 37 6 39 8   PLATELETS Thousands/uL 201 192     Results from last 7 days   Lab Units 22  1148 22  0539   SODIUM mmol/L 130* 134*   POTASSIUM mmol/L 3 7 3 9   CHLORIDE mmol/L 100 99*   CO2 mmol/L 24 28   BUN mg/dL 16 13   CREATININE mg/dL 0 60 0 70   CALCIUM mg/dL 9 2 9 2         Results from last 7 days   Lab Units 22  1148 22  0539   INR  1 87* 1 24*        Results from last 7 days   Lab Units 22  1620 22  1350 22  1204   POC GLUCOSE mg/dl 121 184* 115       No results found for: Karen Chavarria, Subhash Solomon, SPUTUMCULTRONNELL    Input and Output Summary (last 24 hours):     No intake or output data in the 24 hours ending 22 1905    Imaging:     No orders to display       *Labs /Radiology studiesLabs reviewed  *Medications reviewed and reconciled as needed  *Please refer to order section for additional ordered labs studies  *Case discussed with primary attending during morning huddle case rounds    Vitals:   Temp (24hrs), Av 8 °F (36 6 °C), Min:97 6 °F (36 4 °C), Max:98 1 °F (36 7 °C)    Temp:  [97 6 °F (36 4 °C)-98 1 °F (36 7 °C)] 98 1 °F (36 7 °C)  HR:  [49-56] 56  Resp:  [17] 17  BP: (134-168)/(54-82) 134/54  SpO2:  [97 %-98 %] 97 %  There is no height or weight on file to calculate BMI  Physical Exam:   HEENT:  Head: Normocephalic, no lesions, without obvious abnormality  CARDIAC:  regular rate and rhythm, S1, S2 normal, no murmur, click, rub or gallop  LUNGS:  normal air entry, lungs clear to auscultation  ABDOMEN:  soft, non-tender   Bowel sounds normal  No masses, no organomegaly  EXTREMITIES:  extremities normal, warm and well-perfused; no cyanosis, clubbing, or edema  NEURO:   normal without focal findings  PSYCH:  Alert and oriented, appropriate affect  INCISION:  dressings present      Invasive Devices  Report    Peripheral Intravenous Line            Peripheral IV 03/17/22 Dorsal (posterior); Right Forearm 1 day                 VTE Pharmacologic Prophylaxis: Heparin and Warfarin (Coumadin)  Code Status: Level 3 - DNAR and DNI  Current Length of Stay: 0 day(s)    Total floor / unit time spent today 1 hour with more than 50% spent counseling/coordinating care  Counseling includes discussion with patient re: progress  and discussion with patient of his/her current medical state/information  Coordination of patient's care was performed in conjunction with primary service  Time invested included review of patient's labs, vitals, and management of their comorbidities with continued monitoring  In addition, this patient was discussed with medical team including physician and advanced extenders  The care of the patient was extensively discussed and appropriate treatment plan was formulated unique for this patient  ** Please Note: Fluency Direct voice to text software may have been used in the creation of this document   Audio transcription errors may occur**

## 2022-03-18 NOTE — CASE MANAGEMENT
Case Management Discharge Planning Note    Patient name Julio Flight  Location [address] [id number] MRN [id number]  : 1950 Date 3/18/2022       Current Admission Date: 3/5/2022  Current Admission Diagnosis:Cervical myelopathy [facility])   Patient Active Problem List    Diagnosis Date Noted    Muscle spasm 2022    Leukocytosis 2022    Goals of care, counseling/discussion 2022    Sinus bradycardia 03/10/2022    Cervical myelopathy ([facility] [address] ) 2022    Ambulatory dysfunction 2022    Weakness 2022    Pes anserinus bursitis of right knee 2021    IFG (impaired fasting glucose) 2021    Memory difficulty 03/10/2021    History of DVT of lower extremity 2021    Mixed hyperlipidemia 10/27/2020    Paroxysmal A-fib ([facility] [address] [facility] ) 2020    Lower extremity edema 2020    Primary osteoarthritis of left knee 2020    Primary osteoarthritis of right knee 2020    Depression, major, single episode, moderate ([facility] [address] [facility] ) 10/30/2017    Insomnia 03/10/2017    Lumbar herniated disc 03/10/2017    Erectile dysfunction 2016    Status post catheter ablation of atrial flutter 2015    Dyslipidemia 2015    Essential hypertension 2015    WILL (obstructive sleep apnea) 2015    Factor V Leiden mutation ([facility] [address] ) 2014      LOS (days): 13  Geometric Mean LOS (GMLOS) (days): 2 50  Days to GMLOS:-10 3     OBJECTIVE:  Risk of Unplanned Readmission Score: 11         Current admission status: Inpatient   Preferred Pharmacy:   [address], [address] [place] [address],[address]  [address], [address] [address]  Phone: [phone] Fax: [phone]    Primary Care Provider: Marco Antonio Moreno MD    Primary Insurance: [facility]  Secondary Insurance:     DISCHARGE DETAILS:          Comments - Freedom of Choice: [facility] got auth   Pending negative Covid test [facility] Test: Elizabeth Kelley Antonieta Alejo will admit into Room 451, with a transport time of 4:00 PM, for Nurse Report please call [phone]            ETA of Transport (Date): 03/18/22  ETA of Transport (Time): [address] : [facility]  Receiving Facility/Agency Phone Number: [phone]

## 2022-03-18 NOTE — PLAN OF CARE
Problem: OCCUPATIONAL THERAPY ADULT  Goal: Performs self-care activities at highest level of function for planned discharge setting  See evaluation for individualized goals  Description: Treatment Interventions: ADL retraining,Functional transfer training,Endurance training,Continued evaluation,Energy conservation,Activityengagement,Compensatory technique education          See flowsheet documentation for full assessment, interventions and recommendations  Outcome: Progressing  Note: Limitation: Decreased ADL status,Decreased endurance,Decreased self-care trans,Decreased high-level ADLs  Prognosis: Fair  Assessment: Upon arrival of OT, pt was sitting EOB  Pt participated in skilled OT session this date with interventions consisting of ADL re training with the use of correct body mechnaics, safety awareness and fall prevention techniques and  functional transfer training *   In comparison to previous session, pt demonstrates improvements in activity tolerance and ADL performance  Pt continues to be functioning below baseline level  Pt demonstrated the following tasks: SUP sit to stand and CGA for functional ambulation using a RW  Pt completed the following ADLs this date: UB bathing/dressing w Milly sitting EOB, LB bathing/dressing w modA, toileting w SUP standing EOB using urinal, and dental care standing at sink w RW for support  Occupational performance remains limited secondary to factors listed above and increased risk for falls and injury  From OT standpoint, recommendation at time of d/c would be Short Term Rehab  Patient to benefit from continued Occupational Therapy treatment while in the hospital to address deficits as defined above and maximize level of functional independence with ADLs and functional mobility  Upon completion of OT session pt was left w physical therapist  The patient's raw score on the AM-PAC Daily Activity inpatient short form is 17, standardized score is 37 26, less than 39 4  Patients at this level are likely to benefit from discharge to post-acute rehabilitation services  Please refer to the recommendation of the Occupational Therapist for safe discharge planning       OT Discharge Recommendation: Post acute rehabilitation services  OT - OK to Discharge: Yes (When medically appropriate )

## 2022-03-18 NOTE — CASE MANAGEMENT
Case Management Progress Note    Patient name Chapo Jason  Location [address] [place] [address] MRN [id number]  : 1950 Date 3/18/2022       LOS (days): 13  Geometric Mean LOS (GMLOS) (days): 2 50  Days to GMLOS:-10        OBJECTIVE:        Current admission status: Inpatient  Preferred Pharmacy:   [address], [address]  [address] [address] [address]  Phone: [phone] Fax: [phone]    Primary Care Provider: Warren Torres MD    Primary Insurance: [facility] [address]  Secondary Insurance:     PROGRESS NOTE:    CM conferred with the primary team and is aware the pt is stable for hospital discharge  Of note, PMR recommend IRF admission and the pt is in agreement  Referral was submitted to [facility], and authorization is pending as of this date  PT/OT re-eval is needed on 3/18 for [facility] to request auth from the pt's [address] [place] [address] provider  CM will continue to follow       Luis A Buckley, MSW, LCSW, CCM, ACM-SW, [facility]

## 2022-03-18 NOTE — OCCUPATIONAL THERAPY NOTE
Occupational Therapy Progress Note     Patient Name: Kev Butt  XYRPW'C Date: 3/18/2022  Problem List  Principal Problem:    Cervical myelopathy ([address] )  Active Problems:    Essential hypertension    WILL (obstructive sleep apnea)    Factor V Leiden mutation (HCC)    Paroxysmal A-fib (HCC)    Mixed hyperlipidemia    Sinus bradycardia    Goals of care, counseling/discussion    Leukocytosis    Muscle spasm            03/18/22 0915   OT Last Visit   OT Visit Date 03/18/22   Note Type   Note Type Treatment for insurance authorization   Restrictions/Precautions   Weight Bearing Precautions Per Order No   Braces or Orthoses C/S Collar   Other Precautions Multiple lines; Fall Risk;Pain;Spinal precautions   General   Response to Previous Treatment Patient with no complaints from previous session   Lifestyle   Autonomy Pt was I in ADLs, IADLs, and used a cane PTA  Pt A with his wife needs  (+)   Reciprocal Relationships Pt lives with his wife and A with her needs  Pt reports his wife is sick and is unable to A with his needs  Service to Others Pt is retired  Intrinsic Gratification Pt enjoys being with his dog  Pain Assessment   Pain Assessment Tool 0-10   Pain Score 6   ADL   Grooming Assistance 5  Supervision/Setup   Grooming Deficit Setup; Denture care; Teeth care   Grooming Comments Pt completes standing at sink w SUP    UB Bathing Assistance 4  Minimal Assistance   UB Bathing Deficit Setup;Supervision/safety; Increased time to complete;Verbal cueing   UB Bathing Comments Pt requires Milly to complete sitting EOB; pt requires assist to wash back and shoulders    LB Bathing Assistance 3  Moderate Assistance   LB Bathing Deficit Setup;Supervision/safety; Increased time to complete;Verbal cueing   LB Bathing Comments Pt requires modA to complete sitting EOB; pt requires assist for b/l lower legs and feet    UB Dressing Assistance 4  Minimal Assistance   UB Dressing Deficit Setup;Supervision/safety;Verbal cueing; Thread RUE; Increased time to complete; Thread LUE   UB Dressing Comments Pt [facility] gown w Milly    LB Dressing Assistance 3  Moderate Assistance   LB Dressing Deficit Setup; Increased time to complete;Supervision/safety;Verbal cueing; Thread RLE into pants; Thread LLE into pants; Fasteners   LB Dressing Comments Pt requires assist to thread b/l legs through pants and assist w fasteners this date    Toileting Assistance  5  Supervision/Setup   Toileting Deficit Setup   Toileting Comments Pt uses urinal w SUP standing EOB    Bed Mobility   Additional Comments Pt sitting EOB to begin OT treat    Transfers   Sit to Stand 5  Supervision   Additional items Assist x 1; Increased time required;Verbal cues   Stand to Sit 5  Supervision   Additional items Assist x 1; Increased time required;Verbal cues   Additional Comments RW used for transfers    Functional Mobility   Functional Mobility 4  Minimal assistance  (CGA)   Additional Comments CGA, A X1; increased time required, [facility]    Additional items Rolling walker   Cognition   Overall Cognitive Status [facility]   Arousal/Participation Alert; Responsive; Cooperative   Attention Within functional limits   Orientation Level Oriented X4   Memory Decreased recall of precautions   Following Commands Follows one step commands with increased time or repetition   Comments Pt very pleasant, cooperative, and willing to participate in OT treat    Activity Tolerance   Activity Tolerance Patient tolerated treatment well   Medical Staff Made Aware RN cleared pt for OT treat   Assessment   Assessment Upon arrival of OT, pt was sitting EOB  Pt participated in skilled OT session this date with interventions consisting of ADL re training with the use of correct body mechnaics, safety awareness and fall prevention techniques and  functional transfer training *   In comparison to previous session, pt demonstrates improvements in activity tolerance and ADL performance   Pt continues to be functioning below baseline level  Pt demonstrated the following tasks: SUP sit to stand and CGA for functional ambulation using a RW  Pt completed the following ADLs this date: UB bathing/dressing w Milly sitting EOB, LB bathing/dressing w modA, toileting w SUP standing EOB using urinal, and dental care standing at sink w RW for support  Occupational performance remains limited secondary to factors listed above and increased risk for falls and injury  From OT standpoint, recommendation at time of d/c would be Short Term Rehab  Patient to benefit from continued Occupational Therapy treatment while in the hospital to address deficits as defined above and maximize level of functional independence with ADLs and functional mobility  Upon completion of OT session pt was left w physical therapist  The patient's raw score on the AM-PAC Daily Activity inpatient short form is 17, standardized score is 37 26, less than 39 4  Patients at this level are likely to benefit from discharge to post-acute rehabilitation services  Please refer to the recommendation of the Occupational Therapist for safe discharge planning     Plan   Treatment Interventions ADL retraining;Functional transfer training   Goal Expiration Date 03/26/22   OT Treatment Day 2   OT Frequency 3-5x/wk   Recommendation   OT Discharge Recommendation Post acute rehabilitation services   OT - OK to Discharge Yes  (When medically appropriate )   AM-PAC Daily Activity Inpatient   Lower Body Dressing 2   Bathing 2   Toileting 3   Upper Body Dressing 3   Grooming 3   Eating 4   Daily Activity Raw Score 17   Daily Activity Standardized Score (Calc for Raw Score >=11) 37 26   AM-PAC Applied Cognition Inpatient   Following a Speech/Presentation 4   Understanding Ordinary Conversation 4   Taking Medications 4   Remembering Where Things Are Placed or Put Away 4   Remembering List of 4-5 Errands 4   Taking Care of Complicated Tasks 4   Applied Cognition Raw Score 24   Applied Cognition Standardized Score 62 21         [facility] Man, OTR/L

## 2022-03-19 LAB
APTT PPP: 105 SECONDS (ref 23–37)
APTT PPP: 55 SECONDS (ref 23–37)
APTT PPP: 83 SECONDS (ref 23–37)
BASOPHILS # BLD AUTO: 0.01 THOUSANDS/ΜL (ref 0–0.1)
BASOPHILS NFR BLD AUTO: 0 % (ref 0–1)
EOSINOPHIL # BLD AUTO: 0.08 THOUSAND/ΜL (ref 0–0.61)
EOSINOPHIL NFR BLD AUTO: 1 % (ref 0–6)
ERYTHROCYTE [DISTWIDTH] IN BLOOD BY AUTOMATED COUNT: 13.3 % (ref 11.6–15.1)
GLUCOSE SERPL-MCNC: 102 MG/DL (ref 65–140)
GLUCOSE SERPL-MCNC: 133 MG/DL (ref 65–140)
GLUCOSE SERPL-MCNC: 141 MG/DL (ref 65–140)
GLUCOSE SERPL-MCNC: 145 MG/DL (ref 65–140)
GLUCOSE SERPL-MCNC: 190 MG/DL (ref 65–140)
GLUCOSE SERPL-MCNC: 195 MG/DL (ref 65–140)
HCT VFR BLD AUTO: 37.3 % (ref 36.5–49.3)
HGB BLD-MCNC: 12.3 G/DL (ref 12–17)
IMM GRANULOCYTES # BLD AUTO: 0.08 THOUSAND/UL (ref 0–0.2)
IMM GRANULOCYTES NFR BLD AUTO: 1 % (ref 0–2)
INR PPP: 1.97 (ref 0.84–1.19)
LYMPHOCYTES # BLD AUTO: 2.03 THOUSANDS/ΜL (ref 0.6–4.47)
LYMPHOCYTES NFR BLD AUTO: 17 % (ref 14–44)
MCH RBC QN AUTO: 30.5 PG (ref 26.8–34.3)
MCHC RBC AUTO-ENTMCNC: 33 G/DL (ref 31.4–37.4)
MCV RBC AUTO: 93 FL (ref 82–98)
MONOCYTES # BLD AUTO: 1.06 THOUSAND/ΜL (ref 0.17–1.22)
MONOCYTES NFR BLD AUTO: 9 % (ref 4–12)
NEUTROPHILS # BLD AUTO: 8.6 THOUSANDS/ΜL (ref 1.85–7.62)
NEUTS SEG NFR BLD AUTO: 72 % (ref 43–75)
NRBC BLD AUTO-RTO: 0 /100 WBCS
PLATELET # BLD AUTO: 222 THOUSANDS/UL (ref 149–390)
PMV BLD AUTO: 10.6 FL (ref 8.9–12.7)
PROTHROMBIN TIME: 21.4 SECONDS (ref 11.6–14.5)
RBC # BLD AUTO: 4.03 MILLION/UL (ref 3.88–5.62)
WBC # BLD AUTO: 11.86 THOUSAND/UL (ref 4.31–10.16)

## 2022-03-19 PROCEDURE — 97530 THERAPEUTIC ACTIVITIES: CPT

## 2022-03-19 PROCEDURE — 82948 REAGENT STRIP/BLOOD GLUCOSE: CPT

## 2022-03-19 PROCEDURE — 85730 THROMBOPLASTIN TIME PARTIAL: CPT

## 2022-03-19 PROCEDURE — 99232 SBSQ HOSP IP/OBS MODERATE 35: CPT | Performed by: INTERNAL MEDICINE

## 2022-03-19 PROCEDURE — 97535 SELF CARE MNGMENT TRAINING: CPT

## 2022-03-19 PROCEDURE — 85025 COMPLETE CBC W/AUTO DIFF WBC: CPT

## 2022-03-19 PROCEDURE — 97165 OT EVAL LOW COMPLEX 30 MIN: CPT

## 2022-03-19 PROCEDURE — 97162 PT EVAL MOD COMPLEX 30 MIN: CPT

## 2022-03-19 PROCEDURE — 85610 PROTHROMBIN TIME: CPT

## 2022-03-19 RX ORDER — LIDOCAINE 40 MG/G
CREAM TOPICAL 4 TIMES DAILY PRN
Status: DISCONTINUED | OUTPATIENT
Start: 2022-03-19 | End: 2022-03-29 | Stop reason: HOSPADM

## 2022-03-19 RX ORDER — WARFARIN SODIUM 2.5 MG/1
2.5 TABLET ORAL
Status: DISCONTINUED | OUTPATIENT
Start: 2022-03-20 | End: 2022-03-25

## 2022-03-19 RX ORDER — WARFARIN SODIUM 5 MG/1
5 TABLET ORAL
Status: DISCONTINUED | OUTPATIENT
Start: 2022-03-19 | End: 2022-03-25

## 2022-03-19 RX ADMIN — METHOCARBAMOL 500 MG: 500 TABLET ORAL at 13:09

## 2022-03-19 RX ADMIN — PANTOPRAZOLE SODIUM 40 MG: 40 TABLET, DELAYED RELEASE ORAL at 05:39

## 2022-03-19 RX ADMIN — WARFARIN SODIUM 5 MG: 5 TABLET ORAL at 17:29

## 2022-03-19 RX ADMIN — STANDARDIZED SENNA CONCENTRATE 8.6 MG: 8.6 TABLET ORAL at 10:47

## 2022-03-19 RX ADMIN — METHOCARBAMOL 500 MG: 500 TABLET ORAL at 23:55

## 2022-03-19 RX ADMIN — ACETAMINOPHEN 975 MG: 325 TABLET ORAL at 21:48

## 2022-03-19 RX ADMIN — DEXAMETHASONE 2 MG: 2 TABLET ORAL at 10:49

## 2022-03-19 RX ADMIN — METHOCARBAMOL 500 MG: 500 TABLET ORAL at 05:39

## 2022-03-19 RX ADMIN — METHOCARBAMOL 500 MG: 500 TABLET ORAL at 17:29

## 2022-03-19 RX ADMIN — DULOXETINE HYDROCHLORIDE 60 MG: 60 CAPSULE, DELAYED RELEASE ORAL at 21:48

## 2022-03-19 RX ADMIN — DOCUSATE SODIUM 100 MG: 100 CAPSULE, LIQUID FILLED ORAL at 17:29

## 2022-03-19 RX ADMIN — HEPARIN SODIUM 9 UNITS/KG/HR: 10000 INJECTION, SOLUTION INTRAVENOUS; SUBCUTANEOUS at 07:34

## 2022-03-19 RX ADMIN — FLECAINIDE ACETATE 100 MG: 100 TABLET ORAL at 10:52

## 2022-03-19 RX ADMIN — METOPROLOL SUCCINATE 100 MG: 100 TABLET, EXTENDED RELEASE ORAL at 10:56

## 2022-03-19 RX ADMIN — POLYETHYLENE GLYCOL 3350 17 G: 17 POWDER, FOR SOLUTION ORAL at 10:49

## 2022-03-19 RX ADMIN — ATORVASTATIN CALCIUM 40 MG: 40 TABLET, FILM COATED ORAL at 17:29

## 2022-03-19 RX ADMIN — HEPARIN SODIUM 4400 UNITS: 1000 INJECTION INTRAVENOUS; SUBCUTANEOUS at 13:01

## 2022-03-19 RX ADMIN — FLECAINIDE ACETATE 100 MG: 100 TABLET ORAL at 17:30

## 2022-03-19 RX ADMIN — OXYCODONE HYDROCHLORIDE 5 MG: 5 TABLET ORAL at 10:49

## 2022-03-19 RX ADMIN — DICLOFENAC SODIUM 2 G: 10 GEL TOPICAL at 17:30

## 2022-03-19 RX ADMIN — HEPARIN SODIUM 9.9 UNITS/KG/HR: 10000 INJECTION, SOLUTION INTRAVENOUS; SUBCUTANEOUS at 20:15

## 2022-03-19 RX ADMIN — ACETAMINOPHEN 975 MG: 325 TABLET ORAL at 13:08

## 2022-03-19 RX ADMIN — DOCUSATE SODIUM 100 MG: 100 CAPSULE, LIQUID FILLED ORAL at 10:56

## 2022-03-19 RX ADMIN — HYDRALAZINE HYDROCHLORIDE 50 MG: 50 TABLET, FILM COATED ORAL at 13:09

## 2022-03-19 RX ADMIN — INSULIN LISPRO 2 UNITS: 100 INJECTION, SOLUTION INTRAVENOUS; SUBCUTANEOUS at 14:25

## 2022-03-19 RX ADMIN — ACETAMINOPHEN 975 MG: 325 TABLET ORAL at 05:39

## 2022-03-19 RX ADMIN — DICLOFENAC SODIUM 2 G: 10 GEL TOPICAL at 21:53

## 2022-03-19 RX ADMIN — AMLODIPINE BESYLATE 5 MG: 5 TABLET ORAL at 10:46

## 2022-03-19 RX ADMIN — HYDRALAZINE HYDROCHLORIDE 50 MG: 50 TABLET, FILM COATED ORAL at 05:39

## 2022-03-19 RX ADMIN — HYDRALAZINE HYDROCHLORIDE 50 MG: 50 TABLET, FILM COATED ORAL at 21:48

## 2022-03-19 RX ADMIN — AMLODIPINE BESYLATE 5 MG: 5 TABLET ORAL at 21:48

## 2022-03-19 RX ADMIN — METHOCARBAMOL 500 MG: 500 TABLET ORAL at 00:23

## 2022-03-19 NOTE — ASSESSMENT & PLAN NOTE
Internal medicine consulted and management at their discretion  Current meds: Amlodipine, MTP  OP PCP follow-up

## 2022-03-19 NOTE — TREATMENT PLAN
Individualized Plan of Georgie [age] y o  male MRN: [id number]  Unit/Bed#: -01 Encounter: [id number]     PATIENT INFORMATION  ADMISSION DATE: 3/18/2022  5:49 PM CARITO CATEGORY:Spinal Cord Dysfunction:  Non-Traumatic:  04 130 Other Non-Traumatic See below   ADMISSION DIAGNOSIS: Cervical myelopathy EXPECTED LOS: 7 days     MEDICAL/FUNCTIONAL PROGNOSIS  Pre-admit screens and post-admit evaluations reviewed and are consistent  Based on my assessment of the patient's medical conditions and current functional status, the prognosis for attaining medical and functional goals or the IRF stay is:  Good  Patient is appropriate for acute rehabilitation  Medical Goals:   Patient will be medically stable for discharge back to community setting upon completion or acute rehab program and patient/family will be able to manage medical conditions and comorbid conditions with medications and appropriate follow up upon completion of rehab program       Cardiopulmonary function: Ensure cardiopulmonary stability and optimize cardiopulmonary function not only at rest but with activity as patient's activity level significantly increases in acute rehab compared with prior to transfer in preparation for safe discharge from [facility]  Must closely and frequently monitor blood pressure and HR to ensure adequate cardiac output during ADLs and ambulation as patient is at increased risk for orthostatic hypotension/syncope and potential injury if not monitored for and managed adequately  Blood pressure management:    Frequent monitoring of blood pressure with appropriate adjustments in blood pressure medication management to optimize blood pressure control and prevent/limit renal complications  Monitoring impact of blood pressure and side-effects of blood pressure medications at rest and with activity    Pain management:  Pain will improve with frequent evaluation of pain, careful adjustments in medications, frequent re-evaluation of patient's pain and medical/neurologic status to ensure optimal pain control, avoidance of potential serious and even life-threatening side-effects and drug interactions, as well as weaning pain medications as soon as possible to decrease risk of short and long-term use  Neurologic Disorder: cervical myelopathy causing impaired mobility, ADLs, and gait:  intensive skilled therapies with physical therapy and occupational therapy with close oversight and management by rehab specialized physician in acute rehabilitation setting to most expeditiously and effectively improve functional mobility, transfers, upper and lower body strengthening, conditioning, balance, and gait training with appropriate assistive device  Patient will have optimal supervision and management of patient's underlying neurologic disorder with specialized rehabilitation physician during this period of recovery to ensure most expeditious and optimal recovery with decreased risks of fall/injury and other complications including acute worsening of neuro disorder, decrease risk of VTE, PNA, and skin ulceration  Orthopedic Disorder: bilateral knee oA causing impaired mobility, ADLs, and gait:  intensive skilled therapies with physical therapy and occupational therapy with close oversight and management by rehab specialized physician in acute rehabilitation setting to most expeditiously and effectively improve functional mobility, transfers, upper and lower body strengthening, conditioning, balance, and gait training with appropriate assistive device    Patient will have optimal supervision and management of patient's underlying orthopedic disorder with specialized rehabilitation physician during this period of recovery to ensure most expeditious and optimal recovery with decreased risks of fall/injury and other complications including acute worsening of ortho disorder, decrease risk of VTE, PNA, and skin ulceration  Inpatient rehabilitation education/teaching: To be provided to patient and typically family/caregiver (if able to be identified) by all skilled therapists, rehab nursing, case management, and rehab specialized physician to ensure optimal recovery and decrease risks of complications in both acute rehabilitation setting as well as after discharge  Anxiety (and/or Depression): Patient's mood and it's impact on therapy participation and functional recovery will improve during course with supportive counseling, relaxation/breathing techniques and if necessary medication management  Requires frequent re-assessment and close management to ensure anxiety/depression management during acute rehab course with planning for appropriate outpatient management to ensure optimal mental health and functional recovery  Neurogenic bladder:  Appropriate neurogenic bladder management with appropriate toileting program from rehab nursing and staff with oversight management by rehabilitation physicians which include appropriate monitoring and possible adjustments in medications to with goals to optimize bladder function and decrease risk of bladder retention, incontinence, and urinary tract infection  Obesity:  Close monitoring of nutrition status, nutrition specialist with adjustments in diet   on appropriate short and long term nutrition and activity  Obesity increases complexity of patient's overall condition and causes unique challenges during this part of patient's recovery process  Supervise and if necessary make adjustments in rehab nursing care and skilled therapy care to ensure appropriate toileting, bed mobility, other ADLs, and ambulation to decrease risk of falls/injuries, VTE, skin breakdown/ulceration and optimize functional recovery  ANTICIPATED DISCHARGE DISPOSITION AND SERVICES  COMMUNITY SETTING:    Previous community setting      ANTICIPATED FOLLOW-UP SERVICE:   Home Health Services: PT, OT     DISCIPLINE SPECIFIC PLANS:  Required Disciplines & Services: PT, OT, Rehabilitation Physician, Rehabillitation Nursing, Case Management, Dietary    REQUIRED THERAPY (expected): Therapy Hours per Day Days per Week Tx Days (Days in [facility])   Physical Therapy 1 5 5 7   Occupational Therapy 1 5 5 7   NOTE: Additional therapy time(s) may be added as appropriate to meet patient needs and to achieve functional goals  ANTICIPATED FUNCTIONAL OUTCOMES:  ADL: Patient will be largely modified independent with ADLs upon completion of rehab program    Bladder/Bowel: Patient will be modified independent with bladder/bowel management upon completion of rehab program   Transfers: Patient will be modified independent with transfers upon completion of rehab program   Locomotion: Patient will be at or near modified independent with locomotion (wheelchair or ambulation) upon completion of rehab program     DISCHARGE PLANNING NEEDS  Equipment needs: To be determined at team conference  REHAB ANTICIPATED PARTICIPATION RESTRICTIONS:  Uncertain at this time  To be determined closer to discharge

## 2022-03-19 NOTE — PROGRESS NOTES
OT LTGs     03/19/22 1300   Rehab Team Goals   ADL Team Goal Patient will be independent with ADLs with least restrictive device upon completion of rehab program   Rehab Team Interventions   OT Interventions Self Care;Home Management; Therapeutic Exercise; Energy Conservation;Patient/Family Education   Eating Goal   Eating Goal 06  Independent - Patient completes the activity by him/herself with no assistance from a helper  Meal Complete All meals   Status Target goal - two weeks   Interventions Optimal Position   Grooming Goal   Oral Hygiene Goal 06  Independent - Patient completes the activity by him/herself with no assistance from a helper  Task Wash/Dry Face;Wash/Dry Hands;Brush Teeth;Comb Hair;Initiate Task;Complete Groom   Environment Stand at [employer] sit; Seated in Chair   Status Target goal - two weeks   Intervention Neuromuscular Education;Balance Work;Assistive Device; Therapeutic Exercise; Tolerance Work   Tub/Shower Transfer Goal   Method Shower Stall  (GOAL: CS)   Assist Device Seat with Back;Grab Bar;Hand Held Shower   Status Target goal - two weeks   Interventions ADL Training;Assistive Device; Neuromuscular Education   Bathing Goal   Shower/bathe self Goal 05  Setup or clean-up assistance - [place] SETS UP or CLEANS UP, patient completes activity  [place] assists only prior to or following the activity  Environment Seated;Standing;Sponge Bath; Shower   Adaptive Equipment Reacher;Long Handle Sponge;Seat with back;Grab Bar;Hand Held Shower   Status Target goal - two weeks   Intervention ADL Training;Assistive Device; Neuromuscular Education; Therapeutic Exercise   Upper Body Dressing Goal   Upper body dressing Goal 03  Partial/moderate assistance - [place] does less than half the effort  [place] lifts or holds trunk or limbs and provides more than half the effort  Task Upper Body;Arms in/out; Over Head;Button Down; Fasteners;Orthotic  (C-collar)   Environment Seated;Standing   Status Target goal - two weeks   Intervention Assistive Device;Balance Work;Neuromuscular Education; Therapeutic Exercise; Tolerance Work   Lower Body Dressing Goal   Lower body dressing Goal 05  Setup or clean-up assistance - [place] SETS UP or CLEANS UP, patient completes activity  [place] assists only prior to or following the activity  Putting on/taking off footwear Goal 05  Setup or clean-up assistance - [place] SETS UP or CLEANS UP, patient completes activity  [place] assists only prior to or following the activity  Task Lower Body;Shoe/Slipper;Socks;Pants; Undergarment; Fasteners   Adaptive Equipment Reacher;Sock Aide; Shoe Horn;Dressing Stick; Elastic Laces   Environment Seated;Standing   Status Target goal - two weeks   Intervention Assistive Device;Balance Work;Neuromuscular Education; Therapeutic Exercise; Tolerance Work   Toileting Transfer Goal   Toilet transfer Goal 06  Independent - Patient completes the activity by him/herself with no assistance from a helper  Assistive Device Grab Bar;Roller Walker;Single Point Cane;Bedside Commode;Raised Toilet Seat   Status Target goal - two weeks   Intervention ADL Training;Balance Work;Assistive Device   Toileting Goal   Toileting hygiene Goal 06  Independent - Patient completes the activity by him/herself with no assistance from a helper     Task Pants Up;Pants Down;Hygiene   Safety Balance;Use a Bedside Commode at Night   Status Target goal - two weeks   Intervention ADL Training;Balance Work;Assistive Device   Meal Prep and Kitchen Mobility   Assist Level Supervision  (light meal prep)   Status Target goal - two weeks   Medication Management   Assist Level Supervision   Status Target goal - two weeks     Malika Mckeon MS, OTR/L

## 2022-03-19 NOTE — UTILIZATION REVIEW
Notification of Discharge   This is a Notification of Discharge from our facility [address]  Please be advised that this patient has been discharge from our facility  Below you will find the admission and discharge date and time including the patients disposition  UTILIZATION REVIEW CONTACT:  Jolynn Ziegler  Utilization   Network Utilization Review Department  Phone: [phone] x carefully listen to the prompts  All voicemails are confidential   Email: [email or web address]     PHYSICIAN ADVISORY SERVICES:  FOR TYGF-LS-LPDC REVIEW - MEDICAL NECESSITY DENIAL  Phone: [phone]  Fax: [phone]  Email: [email or web address]     PRESENTATION DATE: 3/5/2022  8:18 PM  OBERVATION ADMISSION DATE:   INPATIENT ADMISSION DATE: 3/5/22  8:18 PM   DISCHARGE DATE: 3/18/2022  5:46 PM  DISPOSITION: [facility]      IMPORTANT INFORMATION:  Send all requests for admission clinical reviews, approved or denied determinations and any other requests to dedicated fax number below belonging to the campus where the patient is receiving treatment   List of dedicated fax numbers:  [address] DENIALS (Administrative/Medical Necessity) [phone]   [address] (Maternity/NICU/Pediatrics) [phone]   [facility] [phone]   130 [facility] [phone]   [address] [place] [address] [phone]   [address] [phone]   [facility]  [phone]   [address] [facility] [address]  24[address] [facility] 1000 [address] [facility] [phone]

## 2022-03-19 NOTE — PLAN OF CARE
Problem: PAIN - ADULT  Goal: Verbalizes/displays adequate comfort level or baseline comfort level  Description: Interventions:  - Encourage patient to monitor pain and request assistance  - Assess pain using appropriate pain scale  - Administer analgesics based on type and severity of pain and evaluate response  - Implement non-pharmacological measures as appropriate and evaluate response  - Consider cultural and social influences on pain and pain management  - Notify physician/advanced practitioner if interventions unsuccessful or patient reports new pain  Outcome: Progressing     Problem: INFECTION - ADULT  Goal: Absence or prevention of progression during hospitalization  Description: INTERVENTIONS:  - Assess and monitor for signs and symptoms of infection  - Monitor lab/diagnostic results  - Monitor all insertion sites, i e  indwelling lines, tubes, and drains  - Monitor endotracheal if appropriate and nasal secretions for changes in amount and color  - [place] appropriate cooling/warming therapies per order  - Administer medications as ordered  - Instruct and encourage patient and family to use good hand hygiene technique  - Identify and instruct in appropriate isolation precautions for identified infection/condition  Outcome: Progressing  Goal: Absence of fever/infection during neutropenic period  Description: INTERVENTIONS:  - Monitor WBC    Outcome: Progressing     Problem: SAFETY ADULT  Goal: Patient will remain free of falls  Description: INTERVENTIONS:  - Educate patient/family on patient safety including physical limitations  - Instruct patient to call for assistance with activity   - Consult OT/PT to assist with strengthening/mobility   - Keep Call bell within reach  - Keep bed low and locked with side rails adjusted as appropriate  - Keep care items and personal belongings within reach  - Initiate and maintain comfort rounds  - Make Fall Risk Sign visible to staff  - Offer Toileting  in advance of need  - Initiate/  - Obtain necessary fall risk manag  - Apply yellow socks and bracelet for high fall risk patients  - Consider moving patient to room near nurses station  Outcome: Progressing  Goal: Maintain or return to baseline ADL function  Description: INTERVENTIONS:  -  Assess patient's ability to carry out ADLs; assess patient's baseline for ADL function and identify physical deficits which impact ability to perform ADLs (bathing, care of mouth/teeth, toileting, grooming, dressing, etc )  - Assess/evaluate cause of self-care deficits   - Assess range of motion  - Assess patient's mobility; develop plan if impaired  - Assess patient's need for assistive devices and provide as appropriate  - Encourage maximum independence but intervene and supervise when necessary  - Involve family in performance of ADLs  - Assess for home care needs following discharge   - Consider OT consult to assist with ADL evaluation and planning for discharge  - Provide patient education as appropriate  Outcome: Progressing  Goal: Maintains/Returns to pre admission functional level  Description: INTERVENTIONS:  - Perform BMAT or MOVE assessment daily    - Set and communicate daily mobility goal to care team and patient/family/caregiver  - Collaborate with rehabilitation services on mobility goals if consulted  - Perform Range ofes a day  - Reposition pas    - Dangle patient  - Stand pa  - Ambulate   - Out of bed   - Out of bed for  - Out of bed for toileting  - Record patient progress and toleration of activity level   Outcome: Progressing     Problem: DISCHARGE PLANNING  Goal: Discharge to home or other facility with appropriate resources  Description: INTERVENTIONS:  - Identify barriers to discharge w/patient and caregiver  - Arrange for needed discharge resources and transportation as appropriate  - Identify discharge learning needs (meds, wound care, etc )  - Arrange for interpretive services to assist at discharge as needed  - Refer to Case Management Department for coordinating discharge planning if the patient needs post-hospital services based on physician/advanced practitioner order or complex needs related to functional status, cognitive ability, or social support system  Outcome: Progressing     Problem: Knowledge Deficit  Goal: Patient/family/caregiver demonstrates understanding of disease process, treatment plan, medications, and discharge instructions  Description: Complete learning assessment and assess knowledge base    Interventions:  - Provide teaching at level of understanding  - Provide teaching via preferred learning methods  Outcome: Progressing     Problem: Potential for Falls  Goal: Patient will remain free of falls  Description: INTERVENTIONS:  - Educate patient/family on patient safety including physical limitations  - Instruct patient to call for assistance with activity   - Consult OT/PT to assist with strengthening/mobility   - Keep Call bell within reach  - Keep bed low and locked with side rails adjusted as appropriate  - Keep care items and personal belongings within reach  - Initiate and maintain comfort rounds  - Make Fall Risk Sign visible to staff  - Offer Toileting evHours, in advance of need  - Initiate/[place]  - Obtain necessary fall risk management equipment:   - Apply yellow socks and bracelet for high fall risk patients  - Consider moving patient to room near nurses station  Outcome: Progressing

## 2022-03-19 NOTE — PROGRESS NOTES
Occupational Therapy Initial Evaluation     03/19/22 1300   Patient Data   Rehab Impairment Impairment of mobility, safety and Activities of Daily Living (ADLs) due to Spinal Cord Dysfunction:  Non-Traumatic:  04 130 Other Non-Traumatic      Etiologic Diagnosis Cervical Myelopathy   Date of Onset 03/11/22   Support System   Name Pt w/ supportive wife, Darren Carson, who is able to A w/ IADL management upon d/c  Add'lly w/ sons who are able to A w/ IADL management as needed  Able to provide 24 hour supervision Yes   Able to provide physical help? No   Home Setup   Type of Home Multi Level   Method of Entry Stairs;Hand Rail Bilateral   Number of Stairs 4   Number of Stairs in Home 12  (FF, 1 step in kitchen)   In Home Hand Rail Bilateral   First Floor Bathroom Full; Shower   Second Floor Bathroom Full;Tub  (claw foot)   First Floor Setup Available Yes   Home Modification Comment wife reports plan to purchase shower chair w/ back   Available Equipment Roller Walker;Single Point Cane;Bedside Commode   Baseline Information   Transportation    Prior Device(s) Used   (SPC x1 week PTA)   Prior IADL Participation   Money Management Identify Money;Estimate Costs;Estimate Change;Combine Bills  (shares w/ wife)   Meal Preparation Partial Participation  (shares w/ wife)   Laundry Partial Participation  (shares w/ wife)   Home Cleaning   (wife assists)   Prior Level of Function   Self-Care 3  Independent - Patient completed the activities by him/herself, with or without an assistive device, with no assistance from a helper  Indoor-Mobility (Ambulation) 3  Independent - Patient completed the activities by him/herself, with or without an assistive device, with no assistance from a helper  Stairs 3  Independent - Patient completed the activities by him/herself, with or without an assistive device, with no assistance from a helper  Functional Cognition 3   Independent - Patient completed the activities by him/herself, with or without an assistive device, with no assistance from a helper  Prior Assistance Needed for Household Chores/Cleaning;Meal Preparation; Shopping   Prior Device Used Z  None of the above   Falls in the Last Year   Number of falls in the past 12 months 3   Type of Injury Associated with Fall No injury   Psychosocial   Psychosocial (WDL) WDL   Patient Behaviors/Mood Appropriate for age; Appropriate for situation;Brightens with approach;Calm; Cooperative;Pleasant   Restrictions/Precautions   Precautions Fall Risk;Hard of hearing;Multiple lines;Pain;Spinal precautions   Weight Bearing Restrictions No   ROM Restrictions Yes  (cervical spinal precautions)   Braces or Orthoses C/S Collar   Pain Assessment   Pain Assessment Tool 0-10   Pain Score 4   Pain Location/Orientation Orientation: Bilateral;Location: Neck   Eating Assessment   Type of Assistance Needed Set-up / clean-up   Physical Assistance Level No physical assistance   Eating CARE Score 5   Oral Hygiene   Type of Assistance Needed Set-up / clean-up;Supervision;Verbal cues   Physical Assistance Level No physical assistance   Comment seated in w/c at sink  Oral Hygiene CARE Score 4   Tub/Shower Transfer   Reason Not Assessed Medical;Sponge Bath;Safety  (continous IV)   Shower/Bathe Self   Type of Assistance Needed Physical assistance   Physical Assistance Level 26%-50%   Comment able to bathe BUE and upper legs while seated  Min A to steady while in stance to bathe daniel and buttocks  A to bathe lower legs  Shower/Bathe Self CARE Score 3   Dressing/Undressing Clothing   Type of Assistance Needed Physical assistance   Physical Assistance Level Total assistance   Comment pt able to thread RUE w/ A to protect IV site, A to pull OH and thread LUE  Ax2 to change C-collar pads     Upper Body Dressing CARE Score 1   Type of Assistance Needed Physical assistance   Physical Assistance Level 26%-50%   Comment A to thread BLE while seated, Min A to steady while in stance for clothing management  Lower Body Dressing CARE Score 3   Putting On/Taking Off Footwear   Type of Assistance Needed Physical assistance   Physical Assistance Level Total assistance   Comment A to don/doff  socks  Putting On/Taking Off Footwear CARE Score 1   Toileting Hygiene   Type of Assistance Needed Physical assistance   Physical Assistance Level 26%-50%   Comment managed bladder hygiene while seated, Min A to steady while in stance for clothing management  Toileting Hygiene CARE Score 3   Toilet Transfer   Type of Assistance Needed Physical assistance; Adaptive equipment;Verbal cues   Physical Assistance Level 26%-50%   Comment vc's for proper hand placement, Min A   Toilet Transfer CARE Score 3   Transfer Bed/Chair/Wheelchair   Type of Assistance Needed Physical assistance; Adaptive equipment;Verbal cues   Physical Assistance Level 26%-50%   Comment w/ RW, ambulated<>bathroom  Cues to slow gait pace w/ inc fatigue noted  Chair/Bed-to-Chair Transfer CARE Score 3   Sit to Stand   Type of Assistance Needed Physical assistance;Verbal cues   Physical Assistance Level 25% or less   Comment vc's for proper hand placement   Sit to Stand CARE Score 3   Comprehension   QI: Comprehension 4  Undestands: Clear comprehension without cues or repetitions   Expression   QI: Expression 4  Express complex messages without difficulty and with speech that is clear and easy to [place]   RUE Assessment   RUE Assessment WFL  (R-hand dominant)   LUE Assessment   LUE Assessment WFL   Coordination   Movements are Fluid and Coordinated 1   Sensation   Light Touch Partial deficits in the RUE;Partial deficits in the LUE  (pt reports improvement since sx)   Cognition   Overall Cognitive Status [facility]   Arousal/Participation Alert; Cooperative   Attention Attends with cues to redirect   Orientation Level Oriented X4;Oriented to person;Oriented to place;Oriented to time;Oriented to situation   Memory Within functional limits Following Commands Follows multistep commands with increased time or repetition   Comments Pt able to recall cervical spinal precautions  Wife reported mild memory deficit x1 year, will cont to assess fxnl cognition during course of OT  Vision   Vision Comments Per pt, does not wear glasses at baseline  Discharge Information   Vocational Plan Retired/not working   Patient's Discharge Plan home w/ family support, wife able to A w/ IADL management  Patient's Rehab Expectations "To get stronger and do more for myself "   Barriers to Discharge Home Limited Family Support; Unsafe Home Setup; Decreased Strength;Decreased Endurance;Pain; Safety Considerations; Other  (cervical spinal precautions)   Impressions Pt is a 70 y/o male presenting to [facility] in [place] on 3/4/22 w/ c/o progressive gait imbalance followed by acute R sided weakness/sensory changes and imbalance  Work-up revealed significant cervical spinal cord compression from degenerative spondylosis most consistent w/ cervical myelopathy  Pt underwent anterior cervical discectomy and fixation fusion C5/6 and C6/7, posterior cervical decompression and instrumented fusion C3-T1; now w/ cervical spinal precautions w/ C-collar at all times  PMH includes A-fib, HTN, bradycardia, bilateral knee OA, depression, and Factor V Leiden  Pt reports completing ADLs, and fxnl mobility at independently without AD, (+) driving, shares IADLs w/ wife  Pt live w/ wife who is able to provide S at time of d/c, unable to provide physical A  Local sons able to A w/ IADL as needed  Pt is currently functioning at overall Mod A for ADLs and Min A for fxnl mobility w/ RW  Pt is limited by dec strength, dec endurance, impaired balance, cervical spinal precautions, pain, bulk of C-collar, dec sensation to b/l hands, unsafe home set-up w/ MICHELLE, and ADL dysfunction   Pt will benefit from skilled OT services w/ focus on above barriers, assess need for DME, provide pt/family edu, and maximize fxnl indep to return to PLOF in 2 week ELOS to meet overall Independent ADL goals     OT Therapy Minutes   OT Time In 1300   OT Time Out 1430   OT Total Time (minutes) 90   OT Mode of treatment - Individual (minutes) 90   OT Mode of treatment - Concurrent (minutes) 0   OT Mode of treatment - Group (minutes) 0   OT Mode of treatment - Co-treat (minutes) 0   OT Mode of Treatment - Total time(minutes) 90 minutes   OT Cumulative Minutes 90   Cumulative Minutes   Cumulative therapy minutes 180     Malika Mckeon MS, OTR/L

## 2022-03-19 NOTE — ASSESSMENT & PLAN NOTE
CPAP  - If patient unable to use CPAP - spot check O2 while sleeping and provide 2 NC with goal SpO2>91% - titrate as needed  - Consider noct ox

## 2022-03-19 NOTE — ASSESSMENT & PLAN NOTE
Function improved significantly over course   Patient reports much improved strength, balance, and ambulation s/p sx; reports improved urinary control   Rec HH RN, PT    - Neuro exam stable 3/30 - POD19    - Incision still with some surrounding erythema, edema and some largely sanguinous drainage;    - Discussed with Emmanuelle-NILTON Ward prior to d/c   - He is believed to have some local hematoma with questionable superficial infection   - Patient required post-op heparin drip and rapid transition back to full A/C with warfarin due to high VTE risk given hx of hypercoag status/Factor V Leiden mutation, prior DVT  - Hb trending down some recently   - Patient afebrile without recently leukocytosis    - NSx consulted and closely assisted with mgmt and recs during ARC course   - Cleared for d/c by NSx     - Recommend continuing 7 day course of Keflex   - No additional imaging at this time    - OP follow-up with NSx in 2 days    - Spoke with Emmanuelle-NILTON Ward 3/28 - recommends no additional imaging at this time and OP follow-up Thursday  - Patient seen by Nsx 3/24   - No need for additional imaging at that time    - Continue Keflex for 7 day course    - They recommend continuing staples longer but max 21 days - Friday Apr 1     Cervical myelopathy S/P anterior cervical discectomy and fixation fusion C5/6 and C6/7; Posterior cervical decompression and instrumented fusion C3-T1 by Dr Jose Francisco Wells on 3/11/22  - Wound care consulted and ordered foam dressings to be changed BID and PRN   - Completed family incision/skin care   - Aspen cervical collar on at all times except can use GENIAC for showers  - Activity Restrictions: No heavy lifting greater than 5 - 10lbs  No strenuous activities  No driving while requiring cervical collar, anticipated six weeks  No significant neck movement  - May shower 3 days after surgery, but do not soak in a tub and no swimming, use mild antimicrobial soap and water   Pat incision dry after showering   - Completed acute comprehensive interdisciplinary inpatient rehabilitation to include intensive skilled therapies (PT, OT) as outlined with oversight and management by rehabilitation physician as well as inpatient rehab level nursing, case management and weekly interdisciplinary team meetings     - Optimal pain management

## 2022-03-19 NOTE — PROGRESS NOTES
Internal Medicine Progress Note  Patient: Rajeev Duenas  Age/sex: 71 y o  male  Medical Record #: [id number]      ASSESSMENT/PLAN: (Interval History)  Rajeev Duenas is seen and examined and management for following issues:    S/p anterior cervical discectomy and fixation fusion C5-6 and C6-7, posterior cervical decompression and instrumented fusion C3-T1  · Aspen collar at all times  · Continue Decadron taper  · Pain control and therapy per PMR  PAF  · INR 1 97  · Pt takes Coumadin 5mg Wed and Sat and 2 5mg Mon, Tues, Thurs, Fri and Sun  · DC Heparin drip when INR > 2     HTN  · Has been elevated during hospitalization possible due to steroids  · Continue amlodipine 5mg 2x daily, hydralazine 50mg every 8 hours and Toprol XL 100mg daily  · If BP improves once steroids are completed will decrease hydralazine first   · Monitor BP every shift  Leiden Factor V  · DC Heparin drip when INR > 2   · Coumadin as above  Hyponatremia  · Na 130  · Will continue to monitor  Leukocytosis  · Mild  Likely reactive  No signs of infx  The above assessment and plan was reviewed and updated as determined by my evaluation of the patient on 3/19/2022      Labs:   Results from last 7 days   Lab Units 03/18/22  1148 03/17/22  0539   WBC Thousand/uL 13 18* 11 96*   HEMOGLOBIN g/dL 12 6 13 2   HEMATOCRIT % 37 6 39 8   PLATELETS Thousands/uL 201 192     Results from last 7 days   Lab Units 03/18/22  1148 03/17/22  0539   SODIUM mmol/L 130* 134*   POTASSIUM mmol/L 3 7 3 9   CHLORIDE mmol/L 100 99*   CO2 mmol/L 24 28   BUN mg/dL 16 13   CREATININE mg/dL 0 60 0 70   CALCIUM mg/dL 9 2 9 2         Results from last 7 days   Lab Units 03/19/22  0529 03/18/22  1148   INR  1 97* 1 87*     Results from last 7 days   Lab Units 03/19/22  0644 03/18/22  2213 03/18/22  1620   POC GLUCOSE mg/dl 102 122 121       Review of Scheduled Meds:  Current Facility-Administered Medications   Medication Dose Route Frequency Provider Last Rate  acetaminophen  975 mg Oral [facility] Nandiniylene Batters, MD      amLODIPine  5 mg Oral BID Dea Batters, MD      atorvastatin  40 mg Oral QPM Dea Rutherfords, MD      bisacodyl  10 mg Rectal Daily PRN Dea Batters, MD      calcium carbonate  1,000 mg Oral Daily PRN Dea Batters, MD      cyclobenzaprine  5 mg Oral TID PRN Dea Batters, MD      dexamethasone  2 mg Oral Daily Dea Batters, MD      Diclofenac Sodium  2 g Topical 4x Daily Roylene Batters, MD      docusate sodium  100 mg Oral BID Roylene Batters, MD      DULoxetine  60 mg Oral HS Roylene Batters, MD      flecainide  100 mg Oral BID Roylene Batters, MD      heparin (porcine)  3-30 Units/kg/hr (Order-Specific) Intravenous Titrated Dea Giles, MD 9 Units/kg/hr (03/19/22 0734)    heparin (porcine)  4,400 Units Intravenous Q1H PRN Dea Batters, MD      heparin (porcine)  8,800 Units Intravenous Q1H PRN Dea Batters, MD      hydrALAZINE  50 mg Oral [facility] Nandiniylene Batters, MD      insulin lispro  1-5 Units Subcutaneous HS Dea Batters, MD      insulin lispro  2-12 Units Subcutaneous 4 times day Dea Batters, MD      lidocaine   Topical 4x Daily PRN Dea Rutherfords, MD      methocarbamol  500 mg Oral Q6H [address] Dea Giles, MD      metoprolol succinate  100 mg Oral Daily Dea Giles, MD      oxyCODONE  2 5 mg Oral Q4H PRN Dea Rutherfords, MD      oxyCODONE  5 mg Oral Q4H PRN Dea Batters, MD      pantoprazole  40 mg Oral Early Morning Dea Giles, MD      polyethylene glycol  17 g Oral Daily PRN Dea Batters, MD      senna  1 tablet Oral Daily Dea BatterMD lucinda         Subjective/ HPI: Patient seen and examined  Patients overnight issues or events were reviewed with nursing or staff during rounds or morning huddle session  New or overnight issues include the following:     Pt seen in his room  He states that he slept well last night  He does have mild neck pain  He denies any other complaints      ROS:   A 10 point ROS was performed; negative except as noted above  Imaging:     No orders to display       *Labs /Radiology studies Reviewed  *Medications  reviewed and reconciled as needed  *Please refer to order section for additional ordered labs studies  *Case discussed with primary attending during morning huddle case rounds    Physical Examination:  Vitals:   Vitals:    03/18/22 1744 03/18/22 2214 03/19/22 0423   BP: 146/70 137/72 132/60   BP Location: Left arm Left arm Left arm   Pulse: 55 (!) 54 56   Resp: 18 16 20   Temp: 98 8 °F (37 1 °C) 97 9 °F (36 6 °C) 97 8 °F (36 6 °C)   TempSrc: Oral Oral Oral   SpO2: 99% 99% 95%   Weight:  105 kg (231 lb 7 7 oz)    Height:  5' 11" (1 803 m)      General Appearance: no distress, conversive  HEENT: PERRLA, conjuctiva normal; oropharynx clear; mucous membranes moist;   Neck:  Aspen collar intact  Lungs: CTA, normal respiratory effort, no retractions, expiratory effort normal  CV: regular rate and rhythm , PMI normal   ABD: soft non tender, no masses , no hepatic or splenomegaly  EXT: DP pulses intact, no lymphadenopathy, no edema  Skin: normal turgor, normal texture, no rash  Psych: affect normal, mood normal  Neuro: AAOx3      The above physical exam was reviewed and updated as determined by my evaluation of the patient on 3/19/2022  Invasive Devices  Report    Peripheral Intravenous Line            Peripheral IV 03/17/22 Dorsal (posterior); Right Forearm 2 days                   VTE Pharmacologic Prophylaxis: Heparin and Warfarin (Coumadin)  Code Status: Level 3 - DNAR and DNI  Current Length of Stay: 1 day(s)      Total time spent:  30 minutes with more than 50% spent counseling/coordinating care  Counseling includes discussion with patient re: progress  and discussion with patient of his/her current medical state/information  Coordination of patient's care was performed in conjunction with primary service   Time invested included review of patient's labs, vitals, and management of their comorbidities with continued monitoring  In addition, this patient was discussed with medical team including physician and advanced extenders  The care of the patient was extensively discussed and appropriate treatment plan was formulated unique for this patient  ** Please Note:  voice to text software may have been used in the creation of this document   Although proof errors in transcription or interpretation are a potential of such software**

## 2022-03-19 NOTE — H&P
PHYSICAL MEDICINE AND REHABILITATION H&P/ADMISSION NOTE  Gina Hooper 71 y o  male MRN: [id number]  Unit/Bed#: [facility] 451-01 Encounter: [id number]     Rehab Diagnosis: Spinal Cord Dysfunction:  Non-Traumatic:  04 130 Other Non-Traumatic See below  Etiologic Diagnosis:  Cervical myelopathy     History of Present Illness:   Gina Hooper is a 71 y o  male with PMH of Afib, HTN, bradycardia, bilateral knee OA, depression, Factor V Leiden, on chronic A/C who developed progressive gait imbalance followed by more acute R sided weakness/sensory changes and imbalance found to have significant significant cervical spinal cord compression from degenerative spondylosis most consistent with cervical myelopathy  Patient underwent anterior cervical discectomy and fixation fusion C5/6 and C6/7; Posterior cervical decompression and instrumented fusion C3-T1 by Dr Jose Luis Joshua on 3/11/22  Post-op course notable for significant decline in ADLs and mobility and he appears to be appropriate for admission to [facility] at this time  Chief complaint:  S/P spinal surgery    Subjective: On eval, patient reports he is feeling quite a bit better already after his surgery with improving R sided strength and coordination  He feels like he has improved hand dexterity bilaterally as well  Patient reports urinary frequency and leakage prior to surgery and this is improving as well  He denies constipation, fever, chills, sweats, SOB, calf pain, nausea, or other new complaints  He reports neck pain/tightness adequately helped with current regimen  Review of Systems: A 10-point review of systems was performed  Negative except as listed above  Plan:    * Cervical myelopathy [facility])  Assessment & Plan  Cervical myelopathy S/P decompression and fusion   - Post-op Day 14 is Fri 3/25  - Monitor incision  - Aspen cervical collar on at all times except can use [employer] for showers  - Activity Restrictions: No heavy lifting greater than 5 - 10lbs   No strenuous activities  No driving while requiring cervical collar, anticipated six weeks  No significant neck movement  - May shower 3 days after surgery, but do not soak in a tub and no swimming, use mild antimicrobial soap and water  Pat incision dry after showering   - Monitor for neuro changes, dysphagia, neurogenic bowel/bladder, spasticity  - Recommend acute comprehensive interdisciplinary inpatient rehabilitation to include intensive skilled therapies (PT, OT) as outlined with oversight and management by rehabilitation physician as well as inpatient rehab level nursing, case management and weekly interdisciplinary team meetings     - Optimal pain management  - Follow-up with Spine Sx and if needed PMR after d/c         Pain  Assessment & Plan  Well controlled thus far  - APAP TID  - PRN oxy 5-10mg Q4H PRN  - Robaxin 500mg Q6H > change to TID soon  - Flexeril 5mg TID spasms   - PRN LD cream for needlesticks         Sinus bradycardia  Assessment & Plan  IM consulted and with overall management at their discretion during ARC course  Card OP follow-up     Paroxysmal A-fib ([facility] [address] )  Assessment & Plan  IM consulted and with overall management at their discretion during ARC course  Rate control: MTP, flecainide  Antithrombotic: heparin gtt > warfarin bridge       Depression, major, single episode, moderate (HCC)  Assessment & Plan  Cymbalta  Supportive counseling     Factor V Leiden mutation ([facility] [address] )  Assessment & Plan  Heparin>warfarin bridge per IM     WILL (obstructive sleep apnea)  Assessment & Plan  CPAP  - If patient unable to use CPAP - spot check O2 while sleeping and provide 2 NC with goal SpO2>91% - titrate as needed  - Consider noct ox    Essential hypertension  Assessment & Plan  Internal medicine consulted and management at their discretion  Monitor vitals with and without activity; monitor for orthostasis  Monitor hemoglobin, electrolytes, kidney function, hydration status   Current meds: Amlodipine, MTP      Dyslipidemia  Assessment & Plan  Statin           Disposition   Home with [facility] 7-10 days from admission     Follow-up providers and other issues to be followed up after discharge   PCP   Neurosx   Cards    CODE: Level 3: DNAR and DNI per discussion with pt/family    Restrictions include: Fall precautions    Social History and Prior Level of Function  He lives in [facility] single family home  Miryam Vazquez is  and lives with their spouse  Self Care: Independent, Indoor Mobility: Independent and Cognition: Independent     FALLS IN THE LAST 6 MONTHS: Multiple     HOME ENVIRONMENT:  The living area: can live on one level and 1/2 bath on mail level, can have a first floor set up  There are 5 steps to enter the home      The patient will not have 24 hour supervision/physical assistance available upon discharge      Current Level of Function:    Transfers min-supervision assist  Ambulation 60-70 ft min assist  Mod assist LBD  UBD min assist    Potential Barriers to Discharge:  Co-morbidities (see above), new functional deficits, impaired balance, coordination, pain       Physical Exam:  03/18/22 1744 98 8 °F (37 1 °C) 55 18 146/70 100 99 % None (Room air) Lying     Vitals above reviewed on date of encounter    GEN:  Lying in bed in NAD   HEENT/NECK: C collar in place; incision healing  CARDIAC: Regular rate rhythm, no murmers, no rubs, no gallops  LUNGS:  clear to auscultation, no wheezes, rales, or rhonchi  ABDOMEN: Soft, non-tender, non-distended, normal active bowel sounds  EXTREMITIES/SKIN:  no calf edema, no calf tenderness to palpation  NEURO:   MENTAL STATUS: awake, oriented to person, place, time, and situation, MENTAL STATUS:  Appropriate wakefulness and interaction , CN II-XII: grossly intact  and Strength/MMT:  Near full throughout; FTN intact  PSYCH:  Affect:  Euthymic     Laboratory:    Results from last 7 days   Lab Units 03/18/22  1148 03/17/22  0539   HEMOGLOBIN g/dL 12 6 13 2 HEMATOCRIT % 37 6 39 8   WBC Thousand/uL 13 18* 11 96*     Results from last 7 days   Lab Units 03/18/22  1148 03/17/22  0539 03/16/22  0601   BUN mg/dL 16 13 16   SODIUM mmol/L 130* 134* 134*   POTASSIUM mmol/L 3 7 3 9 3 8   CHLORIDE mmol/L 100 99* 101   CREATININE mg/dL 0 60 0 70 0 68     Results from last 7 days   Lab Units 03/18/22  1148 03/17/22  0539   PROTIME seconds 20 7* 15 1*   INR  1 87* 1 24*        Wt Readings from Last 1 Encounters:   03/18/22 105 kg (231 lb 7 7 oz)     Estimated body mass index is 32 29 kg/m² as calculated from the following:    Height as of this encounter: 5' 11" (1 803 m)  Weight as of this encounter: 105 kg (231 lb 7 7 oz)  Imaging: reviewed    Tolerance for three hours of therapy per therapy day: adequate     Rehabilitation Prognosis: good       Rehabilitation Plan/Therapy Interventions: This patient will have close medical and rehabilitation oversight from a physical medicine and rehabilitation physician and if needed an internal medicine physician to manage the complexity of medical issues to optimize health, ability to participate in therapy, quality of life, and functional outcomes  This patient requires 24 hour rehabilitation nursing to address bowel and bladder management, (pain management if listed below), medication administration, positioning/skin monitoring, fall/injury prevention, and VTE prophylaxis  Physical and occupational therapy: Patient requires PT, OT to improve functional mobility, transfers, upper and lower body strengthening, conditioning, balance, and gait training with appropriate assistive device  PT and OT will be provided approximately 5 times per week for 90 minutes per day  Skilled therapists and nursing will also provide patient/family safety education and training    / will work to ensure proper communication between patient (+/- family) and staff regarding the overall rehabilitation and medical process while patient is in the acute rehabilitation center  / will work with patient (and if applicable family and community resources) to optimize safe discharge  Discharge Planning:    Estimated length of stay:   7 days  Family/Patient Goals:  Patient/family's goals: Return to previous home/apartment  Mobility Goals:   Largely mod indep    Activities of Daily Living (ADLs) Goals:  Largely mod indep    Rehabilitation and discharge goals discussed with the patient and/or family  Case Managment and Social Work to review patient/family resources and to coordinate Discharge Planning  Patient and Family Education and Training:  Rehabilitation and discharge goals discussed with the patient and/or family  Patient/family education/training needs to be discussed in weekly team meeting  Other equipment:  To be determined    Drug regimen reviewed, all potential adverse effects identified and addressed:    Scheduled Meds:  Current Facility-Administered Medications   Medication Dose Route Frequency Provider Last Rate    acetaminophen  975 mg Oral [facility] Idalia Medina MD      amLODIPine  5 mg Oral BID Idalia Medina MD      atorvastatin  40 mg Oral QPM Idalia Medina MD      bisacodyl  10 mg Rectal Daily PRN Idalia Medina MD      calcium carbonate  1,000 mg Oral Daily PRN Idalia Medina MD      cyclobenzaprine  5 mg Oral TID PRN Idalia Medina MD      dexamethasone  2 mg Oral Daily Idalia Medina MD      Diclofenac Sodium  2 g Topical 4x Daily Idalia Medina MD      docusate sodium  100 mg Oral BID Idalia Medina MD      DULoxetine  60 mg Oral HS Idalia Medina MD      flecainide  100 mg Oral BID Idalia Medina MD      heparin (porcine)  3-30 Units/kg/hr (Order-Specific) Intravenous Titrated Idalia Medina MD 9 Units/kg/hr (03/19/22 0734)    heparin (porcine)  4,400 Units Intravenous Q1H PRN Idalia Medina MD      heparin (porcine)  8,800 Units Intravenous Q1H PRN Idalia Medina MD      hydrALAZINE  50 mg Oral [facility] Ciro Wooten MD      insulin lispro  1-5 Units Subcutaneous HS Ciro Wooten MD      insulin lispro  2-12 Units Subcutaneous 4 times day Ciro Wooten MD      lidocaine   Topical 4x Daily PRN Ciro Wooten MD      methocarbamol  500 mg Oral Q6H [address] Ciro Wooten MD      metoprolol succinate  100 mg Oral Daily Ciro Wooten MD      oxyCODONE  2 5 mg Oral Q4H PRN Ciro Wooten MD      oxyCODONE  5 mg Oral Q4H PRN Ciro Wooten MD      pantoprazole  40 mg Oral Early Morning Ciro Wooten MD      polyethylene glycol  17 g Oral Daily PRN Ciro Wooten MD      senna  1 tablet Oral Daily MD Mikhail Dodd ON 3/20/2022] warfarin  2 5 mg Oral Once per day on Sun Mon Tue Thu Fri Henna Hsieh PA-C      warfarin  5 mg Oral Once per day on Wed Sat Henna Hsieh PA-C         Past Medical History:   Past Surgical History:   Family History:   Social history:   Past Medical History:   Diagnosis Date    Acute venous embolism and thrombosis of deep vessels of proximal lower extremity ([facility] [address] )     Last assessed: 5/18/15    Arthritis     Cellulitis     LE    CPAP (continuous positive airway pressure) dependence     Factor V Leiden ([facility] [address] )     Forgetfulness     [place] (hard of hearing)     Paroxysmal atrial fibrillation ([facility] [address] )     Last assessed: 11/2/15    Sleep apnea     Past Surgical History:   Procedure Laterality Date    BACK SURGERY      Lower    COLON SURGERY      COLONOSCOPY      CO ARTHRODESIS ANT INTERBODY MIN DISCECTOMY, CERVICAL BELOW C2 N/A 3/11/2022    Procedure: Anterior cervical discectomy and fixation fusion C5/6 and C6/7; Posterior cervical decompression and instrumented fusion C3-T1;   Surgeon: Parish Pfeiffer MD;  Location: [facility];  Service: Neurosurgery     Family History   Problem Relation Age of Onset    Lung cancer Mother     No Known Problems Father     Heart attack Brother     Atrial fibrillation Brother  Emphysema Sister       Social History     Socioeconomic History    Marital status: /Civil Union     Spouse name: Cosimo Crigler Number of children: 4    Years of education: None    Highest education level: None   Occupational History    Occupation: Retired   Tobacco Use    Smoking status: Never Smoker    Smokeless tobacco: Never Used   Vaping Use    Vaping Use: Never used   Substance and Sexual Activity    Alcohol use: Not Currently    Drug use: No    Sexual activity: None   Other Topics Concern    None   Social History Narrative    Caffeine use: 2 cups coffee a day     Social Determinants of Health     Financial Resource Strain: Not on file   Food Insecurity: No Food Insecurity    Worried About Running Out of Food in the Last Year: Never true    Kacy of Food in the Last Year: Never true   Transportation Needs: No Transportation Needs    Lack of Transportation (Medical): No    Lack of Transportation (Non-Medical):  No   Physical Activity: Not on file   Stress: Not on file   Social Connections: Not on file   Intimate Partner Violence: Not on file   Housing Stability: Unknown    Unable to Pay for Housing in the Last Year: No    Number of Places Lived in the Last Year: Not on file    Unstable Housing in the Last Year: No          Current Medical Diagnosis Allergies   Patient Active Problem List   Diagnosis    Status post catheter ablation of atrial flutter    Dyslipidemia    Essential hypertension    WILL (obstructive sleep apnea)    Factor V Leiden mutation ([facility] [address] )    Erectile dysfunction    Depression, major, single episode, moderate ([facility] [address] )    Insomnia    Lumbar herniated disc    Primary osteoarthritis of left knee    Primary osteoarthritis of right knee    [facility]    Lower extremity edema    Mixed hyperlipidemia    History of DVT of lower extremity    Memory difficulty    IFG (impaired fasting glucose)    Pes anserinus bursitis of right knee    Ambulatory dysfunction    Weakness    Cervical myelopathy (HCC)    Sinus bradycardia    Goals of care, counseling/discussion    Leukocytosis    Muscle spasm    Pain    Allergies   Allergen Reactions    Morphine GI Intolerance    Vitamin K Other (See Comments)     On coumadin-patient sensitive to vitamin K amounts in meds etc            Medical Necessity Criteria for ARC Admission: See below  In addition, the preadmission screen, post-admission physical evaluation, overall plan of care and admissions order demonstrate a reasonable expectation that the following criteria were met at the time of admission to the [facility]  1  The patient requires active and ongoing therapeutic intervention of multiple therapy disciplines (physical therapy, occupational therapy, speech-language pathology, or prosthetics/orthotics), one of which is physical or occupational therapy  2  Patient requires an intensive rehabilitation therapy program, as defined in Chapter 1, section 110 2 2 of the CMS Medicare Policy Manual  This intensive rehabilitation therapy program will consist of at least 3 hours of therapy per day at least 5 days per week or at least 15 hours of intensive rehabilitation therapy within a 7 consecutive day period, beginning with the date of admission to the [facility]  3  The patient is reasonably expected to actively participate in, and benefit significantly from, the intensive rehabilitation therapy program as defined in Chapter 1, section 110 2 2 of the CMS Medicare Policy Manual at this time of admission to the [facility]  He can reasonably be expected to make measurable improvement (that will be of practical value to improve the patients functional capacity or adaptation to impairments) as a result of the rehabilitation treatment, as defined in section 110 3, and such improvement can be expected to be made within the prescribed period of time   As noted in the CMS Medicare Policy Manual, the patient need not be expected to achieve complete independence in the domain of self-care nor be expected to return to his or her prior level of functioning in order to meet this standard  4  The patient must require physician supervision by a rehabilitation physician  As such, a rehabilitation physician will conduct face-to-face visits with the patient at least 3 days per week throughout the patients stay in the [facility] to assess the patient both medically and functionally, as well as to modify the course of treatment as needed to maximize the patients capacity to benefit from the rehabilitation process  5  The patient requires an intensive and coordinated interdisciplinary approach to providing rehabilitation, as defined in Chapter 1, section 110 2 5 of the CMS Medicare Policy Manual  This will be achieved through periodic team conferences, conducted at least once in a 7-day period, and comprising of an interdisciplinary team of medical professionals consisting of: a rehabilitation physician, registered nurse,  and/or , and a licensed/certified therapist from each therapy discipline involved in treating the patient  Changes Since Pre-admission Assessment: None -This patient's participation in rehab continues to be reasonable, necessary and appropriate  CMS Required Post-Admission Physician Evaluation Elements  History and Physical, including medical history, functional history and active comorbidities as in above text  Post-Admission Physician Evaluation:  The patient has the potential to make improvement and is in need of physical, occupational, and/or therapy services  The patient may also need nutritional services  Given the patient's complex medical condition and risk of further medical complications, rehabilitative services cannot be safely provided at a lower level of care, such as a skilled nursing facility   I have reviewed the patient's functional and medical status at the time of the preadmission screening and they are the same as on the day of this admission  I acknowledge that I have personally performed a full physical examination on this patient within 24 hours of admission  The patient and/or family demonstrated understanding the rehabilitation program and the discharge process after we discussed them  Agree in entirety: yes  Minor adaptions: none    Major changes: none    Specific areas of management and oversight in ARC setting:    Cardiopulmonary function: Ensure cardiopulmonary stability and optimize cardiopulmonary function not only at rest but with activity as patient's activity level significantly increases in acute rehab compared with prior to transfer in preparation for safe discharge from [facility]  Must closely and frequently monitor blood pressure and HR to ensure adequate cardiac output during ADLs and ambulation as patient is at increased risk for orthostatic hypotension/syncope and potential injury if not monitored for and managed adequately  Blood pressure management:    Frequent monitoring of blood pressure with appropriate adjustments in blood pressure medication management to optimize blood pressure control and prevent/limit renal complications  Monitoring impact of blood pressure and side-effects of blood pressure medications at rest and with activity  Pain management:  Pain will improve with frequent evaluation of pain, careful adjustments in medications, frequent re-evaluation of patient's pain and medical/neurologic status to ensure optimal pain control, avoidance of potential serious and even life-threatening side-effects and drug interactions, as well as weaning pain medications as soon as possible to decrease risk of short and long-term use       Neurologic Disorder: cervical myelopathy causing impaired mobility, ADLs, and gait:  intensive skilled therapies with physical therapy and occupational therapy with close oversight and management by rehab specialized physician in acute rehabilitation setting to most expeditiously and effectively improve functional mobility, transfers, upper and lower body strengthening, conditioning, balance, and gait training with appropriate assistive device  Patient will have optimal supervision and management of patient's underlying neurologic disorder with specialized rehabilitation physician during this period of recovery to ensure most expeditious and optimal recovery with decreased risks of fall/injury and other complications including acute worsening of neuro disorder, decrease risk of VTE, PNA, and skin ulceration  Orthopedic Disorder: bilateral knee oA causing impaired mobility, ADLs, and gait:  intensive skilled therapies with physical therapy and occupational therapy with close oversight and management by rehab specialized physician in acute rehabilitation setting to most expeditiously and effectively improve functional mobility, transfers, upper and lower body strengthening, conditioning, balance, and gait training with appropriate assistive device  Patient will have optimal supervision and management of patient's underlying orthopedic disorder with specialized rehabilitation physician during this period of recovery to ensure most expeditious and optimal recovery with decreased risks of fall/injury and other complications including acute worsening of ortho disorder, decrease risk of VTE, PNA, and skin ulceration  Inpatient rehabilitation education/teaching: To be provided to patient and typically family/caregiver (if able to be identified) by all skilled therapists, rehab nursing, case management, and rehab specialized physician to ensure optimal recovery and decrease risks of complications in both acute rehabilitation setting as well as after discharge     Anxiety (and/or Depression): Patient's mood and it's impact on therapy participation and functional recovery will improve during course with supportive counseling, relaxation/breathing techniques and if necessary medication management  Requires frequent re-assessment and close management to ensure anxiety/depression management during acute rehab course with planning for appropriate outpatient management to ensure optimal mental health and functional recovery  Neurogenic bladder:  Appropriate neurogenic bladder management with appropriate toileting program from rehab nursing and staff with oversight management by rehabilitation physicians which include appropriate monitoring and possible adjustments in medications to with goals to optimize bladder function and decrease risk of bladder retention, incontinence, and urinary tract infection  Obesity:  Close monitoring of nutrition status, nutrition specialist with adjustments in diet   on appropriate short and long term nutrition and activity  Obesity increases complexity of patient's overall condition and causes unique challenges during this part of patient's recovery process  Supervise and if necessary make adjustments in rehab nursing care and skilled therapy care to ensure appropriate toileting, bed mobility, other ADLs, and ambulation to decrease risk of falls/injuries, VTE, skin breakdown/ulceration and optimize functional recovery  Sivakumar Alfredo MD, [address] [facility]  Physical Medicine and Rehabilitation  Brain Injury Medicine    ** Please Note: Fluency Direct voice to text software may have been used in the creation of this document  **    I personally performed the required components and examined the patient myself in person on 3/18/22

## 2022-03-19 NOTE — ASSESSMENT & PLAN NOTE
IM consulted and with overall management at their discretion during ARC course  Rate control: MTP, flecainide  Antithrombotic:Warfarin

## 2022-03-19 NOTE — PROGRESS NOTES
03/19/22 0900   Patient Data   Rehab Impairment Impairment of mobility, safety and Activities of Daily Living (ADLs) due to Spinal Cord Dysfunction:  Non-Traumatic:  04 130 Other Non-Traumatic      Etiologic Diagnosis Progressive Cervical Spinal Stenosis with Myelopathy   Date of Onset 03/11/22   Support System   Name Wife  (Wife is "terminally ill" but has 4 supportive sons)   Able to provide 24 hour supervision No   Able to provide physical help? Yes   Multiple Support Systems Yes  (Lives with terminally ill wife; 4 sons, 1 nearby and assists)   Home Setup   Type of Home Multi Level  (3 story house, FF with B HR)   Method of Entry Stairs;Hand Rail Bilateral   Number of Stairs 4  (B HR (Can reach both at same time))   Number of Stairs in Home 12  (B HR (can reach both), 1 MICHELLE kitchen (no HR))   In Home Hand Rail Bilateral   First Floor Bathroom Full; Shower;Grab Bars  (removable shower chair)   First Floor Bathroom Accessibility Grab bars in tub/shower; Shower chair   Second Floor Bathroom Full;Tub   First Floor Setup Available Yes   Home Modifications Necessary? (Can sleep in recliner chair/couch and use first floor BR)   Available Equipment Roller Walker;Single [employer]; Shower Chair   Baseline Information   Vocation   (Retired (/))   Transportation    Prior Device(s) Used   (None)   Prior Level of Function   Self-Care 3  Independent - Patient completed the activities by him/herself, with or without an assistive device, with no assistance from a helper  Indoor-Mobility (Ambulation) 3  Independent - Patient completed the activities by him/herself, with or without an assistive device, with no assistance from a helper  Stairs 3  Independent - Patient completed the activities by him/herself, with or without an assistive device, with no assistance from a helper  Functional Cognition 3   Independent - Patient completed the activities by him/herself, with or without an assistive device, with no assistance from a helper  Prior Assistance Needed for   (None)   Prior Device Used Z  None of the above   Falls in the Last Year   Number of falls in the past 12 months 3  (3 falls in 2 days d/t sudden onset of weakness)   Type of Injury Associated with Fall No injury   Psychosocial   Psychosocial (WDL) WDL   Restrictions/Precautions   Precautions Fall Risk;Multiple lines;Pain;Spinal precautions  (Heparin drip, c-spinal precautions)   Weight Bearing Restrictions No   ROM Restrictions Yes  (C-spine precautions)   Braces or Orthoses   (Aspen Collar donned at all times)   Pain Assessment   Pain Assessment Tool 0-10   Pain Score 6   Pain Location/Orientation Location: Neck   Pain Onset/Description Onset: Ongoing;Frequency: Constant/Continuous; Descriptor: Burning; Descriptor: Aching;Descriptor: Discomfort   Patient's Stated Pain Goal No pain   Hospital Pain Intervention(s) Ambulation/increased activity;Repositioned   Eating Assessment   Type of Assistance Needed Set-up / clean-up   Eating CARE Score 5   Toilet Transfer   Surface Assessed Raised Toilet  (used platform commode )   Transfer Technique Stand Pivot   Limitations Noted In Balance;Confidence; Safety;LE Strength   Adaptive Equipment Grab Bar   Findings   (Assist for balance and cuing for management of line)   Type of Assistance Needed Physical assistance   Physical Assistance Level 26%-50%   Comment   (management of heparin drip line and hand placement cues)   Toilet Transfer CARE Score 3   Transfer Bed/Chair/Wheelchair   Positioning Concerns   (Aspen collar donned, C-spine in neutral)   Limitations Noted In Balance;Confidence; Endurance;LE Strength;UE Strength; Sequencing   Adaptive Equipment   (Trialed with and without walker, CGA with RW, Milly w/o AD)   Type of Assistance Needed Physical assistance   Physical Assistance Level 26%-50%   Comment Increased assistance for SPT without AD for balance and due to LE weakness  (SPT from bed > chair, chair <> toilet, chair > recliner colby)   Chair/Bed-to-Chair Transfer CARE Score 3   Roll Left and Right   Type of Assistance Needed Supervision   Physical Assistance Level  --    Comment Maintained precautions with flat HOB   Roll Left and Right CARE Score 4   Sit to Lying   Type of Assistance Needed Incidental touching   Physical Assistance Level  --    Sit to Lying CARE Score 4   Lying to Sitting on Side of Bed   Type of Assistance Needed Physical assistance   Physical Assistance Level 25% or less   Comment no HR, pt reports he uses bedside table to A at home   Lying to Sitting on Side of Bed CARE Score 3   Sit to Stand   Type of Assistance Needed Physical assistance   Physical Assistance Level 25% or less   Comment   (VC for hand placement andsequencing, A for stability)   Sit to Stand CARE Score 3   Picking Up Object   Type of Assistance Needed Physical assistance   Physical Assistance Level 51%-75%   Comment Used reacher, did not attempt w/o reacher due to safety precautions   Picking Up Object CARE Score 2   Car Transfer   Reason if not Attempted Environmental limitations  (Time constraints, in gown, heparin drip)   Car Transfer CARE Score 10   Ambulation   Primary Mode of Locomotion Prior to Admission Walk   Distance Walked (feet) 40 ft   Assist Device Roller Walker  (Trialed with RW and without AD)   Gait Pattern Slow Danae;R knee davidson; Lateral deviation;Narrow AZRA;Step through   Limitations Noted In Balance; Coordination; Endurance;Posture; Sequencing;Speed;Strength   Provided Assistance with: Balance;Direction   Walk Assist Level Minimum Assist   Findings Increased stability with RW vs  no AD   Instructed on patient on utilizing RW in room with nursing from bed <> bathroom and reducing reliance on RW in therapy to return to PLOF   Walk 10 Feet   Type of Assistance Needed Physical assistance   Physical Assistance Level Total assistance   Comment Ax2 for walking without RW  (Min A with RW; Mod A without walker for stability )   Walk 10 Feet CARE Score 1   Walk 50 Feet with Two Turns   Reason if not Attempted Safety concerns   Walk 50 Feet with Two Turns CARE Score 88   Walk 150 Feet   Reason if not Attempted Safety concerns   Walk 150 Feet CARE Score 88   Walking 10 Feet on Uneven Surfaces   Reason if not Attempted Safety concerns   Walking 10 Feet on Uneven Surfaces CARE Score 88   Wheelchair mobility   Type of Wheelchair Used 1  Manual   Method Right upper extremity; Left upper extremity  (Hgih back)   Assistance Provided For   (Instructed on brakes, obstacle negotiation - good carryover)   Distance Level Surface (feet) 150 ft   Wheel 50 Feet with Two Turns   Type of Assistance Needed Physical assistance   Physical Assistance Level 25% or less   Comment   (A w/ obstacle negotiation and WC management with brakes)   Wheel 50 Feet with Two Turns CARE Score 3   Wheel 150 Feet   Type of Assistance Needed Physical assistance   Physical Assistance Level 25% or less   Wheel 150 Feet CARE Score 3   Curb or Single Stair   Style negotiated Single stair   Type of Assistance Needed Physical assistance   Physical Assistance Level 26%-50%   1 Step (Curb) CARE Score 3   4 Steps   Type of Assistance Needed Physical assistance   Physical Assistance Level 26%-50%   4 Steps CARE Score 3   12 Steps   Type of Assistance Needed Physical assistance   Physical Assistance Level 26%-50%   12 Steps CARE Score 3   Stairs   Type  Steps   # of Steps 15   Assist Devices Bilateral Rail  (Single R HR for 1 step x3 with L LE leading)   Findings 15 steps on  steps (6" step), B HR primarily with step up/down leading with L LE, trialing R HR only 3x with L LE leading; B HR with R LE leading 3x, negotiated up 2-6" step with turn at top, descending with B HR 2-6" steps 1x; non-reciprocal gait due to R knee buckling   Comprehension   Assist Devices Hearing Aid   QI: Comprehension 4   Undestands: Clear comprehension without cues or repetitions   Expression   QI: Expression 4  Express complex messages without difficulty and with speech that is clear and easy to [place]   Social Interaction   Behaviors observed Appropriate   RLE Assessment   RLE Assessment   (gross MMT 3+/5)   LLE Assessment   LLE Assessment   (gross MMT 3+/5)   RUE Assessment   RUE Assessment WFL   LUE Assessment   LUE Assessment WFL   Coordination   Movements are Fluid and Coordinated 1   Sensation   Light Touch No apparent deficits   Cognition   Overall Cognitive Status WFL   Arousal/Participation Cooperative   Attention Within functional limits   Orientation Level Oriented X4   Following Commands Follows all commands and directions without difficulty   Discharge Information   Vocational Plan Retired/not working   Barriers to Discharge Home Limited Family Support;Decreased Strength;Decreased Endurance;Pain; Safety Considerations   Impressions Patient is a 70 yo male presenting to [facility] with diagnosis of cervical myelopathy on 3/4  Pt hospitalized due to onset of B UE/LE numbness, tingling, and weakness with 3 falls in 2 days  Pt underwent surgery on 3/11 for anterior cervical discectomy and fixation with fusion of C5-6 and C6-7 and posterior decompression and fusion of C3-T1  Pt is to wear Aspen collar at all times  Pt's comorbidities include Factor V Leiden and B knee OA with R knee buckling  Pt currently on a Heparin drip with plans to transition back to Coumadin  Pt presents with LE weakness and instability, requiring min-modA for stairs and gait, recommending RW when ambulating in room with nursing staff but will continue to trial ambulation without AD in therapy  Pt will benefit from skilled PT services to address strength and balance with transfers, gait, and stairs in order to return to OF and safely return home with wife  Additionally, pt presents with inc fall risk factors and dec righting reactions at this time, which are additional barriers to home      PT Therapy Minutes   PT Time In 0900   PT Time Out 1030   PT Total Time (minutes) 90   PT Mode of treatment - Individual (minutes) 90   PT Mode of treatment - Concurrent (minutes) 0   PT Mode of treatment - Group (minutes) 0   PT Mode of treatment - Co-treat (minutes) 0   PT Mode of Treatment - Total time(minutes) 90 minutes   PT Cumulative Minutes 90   Cumulative Minutes   Cumulative therapy minutes 90

## 2022-03-19 NOTE — ASSESSMENT & PLAN NOTE
D/C on   - APAP TID PRN   - PRN oxy 2 5-5mg TID PRN Disp #15  - [place] PDMP website checked and no concerning Rx's or Rx patterns noted    - Gabapentin 100mg qday and 300mg HS

## 2022-03-20 LAB
APTT PPP: 116 SECONDS (ref 23–37)
APTT PPP: 30 SECONDS (ref 23–37)
GLUCOSE SERPL-MCNC: 113 MG/DL (ref 65–140)
GLUCOSE SERPL-MCNC: 114 MG/DL (ref 65–140)
GLUCOSE SERPL-MCNC: 116 MG/DL (ref 65–140)
GLUCOSE SERPL-MCNC: 120 MG/DL (ref 65–140)
GLUCOSE SERPL-MCNC: 169 MG/DL (ref 65–140)
INR PPP: 2.31 (ref 0.84–1.19)
PROTHROMBIN TIME: 24.3 SECONDS (ref 11.6–14.5)

## 2022-03-20 PROCEDURE — 99232 SBSQ HOSP IP/OBS MODERATE 35: CPT | Performed by: STUDENT IN AN ORGANIZED HEALTH CARE EDUCATION/TRAINING PROGRAM

## 2022-03-20 PROCEDURE — 97535 SELF CARE MNGMENT TRAINING: CPT

## 2022-03-20 PROCEDURE — 99232 SBSQ HOSP IP/OBS MODERATE 35: CPT | Performed by: INTERNAL MEDICINE

## 2022-03-20 PROCEDURE — 85730 THROMBOPLASTIN TIME PARTIAL: CPT

## 2022-03-20 PROCEDURE — 97530 THERAPEUTIC ACTIVITIES: CPT

## 2022-03-20 PROCEDURE — 85610 PROTHROMBIN TIME: CPT | Performed by: PHYSICIAN ASSISTANT

## 2022-03-20 PROCEDURE — 82948 REAGENT STRIP/BLOOD GLUCOSE: CPT

## 2022-03-20 RX ADMIN — WARFARIN SODIUM 2.5 MG: 5 TABLET ORAL at 17:28

## 2022-03-20 RX ADMIN — ACETAMINOPHEN 975 MG: 325 TABLET ORAL at 05:21

## 2022-03-20 RX ADMIN — FLECAINIDE ACETATE 100 MG: 100 TABLET ORAL at 10:13

## 2022-03-20 RX ADMIN — DOCUSATE SODIUM 100 MG: 100 CAPSULE, LIQUID FILLED ORAL at 10:14

## 2022-03-20 RX ADMIN — STANDARDIZED SENNA CONCENTRATE 8.6 MG: 8.6 TABLET ORAL at 10:14

## 2022-03-20 RX ADMIN — HYDRALAZINE HYDROCHLORIDE 50 MG: 50 TABLET, FILM COATED ORAL at 21:40

## 2022-03-20 RX ADMIN — ACETAMINOPHEN 975 MG: 325 TABLET ORAL at 14:21

## 2022-03-20 RX ADMIN — ACETAMINOPHEN 975 MG: 325 TABLET ORAL at 21:39

## 2022-03-20 RX ADMIN — METHOCARBAMOL 500 MG: 500 TABLET ORAL at 05:21

## 2022-03-20 RX ADMIN — HYDRALAZINE HYDROCHLORIDE 50 MG: 50 TABLET, FILM COATED ORAL at 14:21

## 2022-03-20 RX ADMIN — DICLOFENAC SODIUM 2 G: 10 GEL TOPICAL at 21:41

## 2022-03-20 RX ADMIN — ATORVASTATIN CALCIUM 40 MG: 40 TABLET, FILM COATED ORAL at 17:29

## 2022-03-20 RX ADMIN — PANTOPRAZOLE SODIUM 40 MG: 40 TABLET, DELAYED RELEASE ORAL at 05:21

## 2022-03-20 RX ADMIN — DICLOFENAC SODIUM 2 G: 10 GEL TOPICAL at 17:29

## 2022-03-20 RX ADMIN — METOPROLOL SUCCINATE 100 MG: 100 TABLET, EXTENDED RELEASE ORAL at 10:15

## 2022-03-20 RX ADMIN — AMLODIPINE BESYLATE 5 MG: 5 TABLET ORAL at 21:40

## 2022-03-20 RX ADMIN — METHOCARBAMOL 500 MG: 500 TABLET ORAL at 17:28

## 2022-03-20 RX ADMIN — METHOCARBAMOL 500 MG: 500 TABLET ORAL at 11:26

## 2022-03-20 RX ADMIN — HYDRALAZINE HYDROCHLORIDE 50 MG: 50 TABLET, FILM COATED ORAL at 05:21

## 2022-03-20 RX ADMIN — FLECAINIDE ACETATE 100 MG: 100 TABLET ORAL at 17:30

## 2022-03-20 RX ADMIN — DOCUSATE SODIUM 100 MG: 100 CAPSULE, LIQUID FILLED ORAL at 17:29

## 2022-03-20 RX ADMIN — DULOXETINE HYDROCHLORIDE 60 MG: 60 CAPSULE, DELAYED RELEASE ORAL at 21:40

## 2022-03-20 RX ADMIN — INSULIN LISPRO 2 UNITS: 100 INJECTION, SOLUTION INTRAVENOUS; SUBCUTANEOUS at 19:06

## 2022-03-20 RX ADMIN — AMLODIPINE BESYLATE 5 MG: 5 TABLET ORAL at 10:14

## 2022-03-20 NOTE — PROGRESS NOTES
03/20/22 0835   Pain Assessment   Pain Score 4   Pain Location/Orientation Location: Neck   Pain Onset/Description Onset: Ongoing   Hospital Pain Intervention(s) Repositioned   Restrictions/Precautions   Precautions Fall Risk;Hard of hearing;Supervision on toilet/commode;Pain;Multiple lines  (Heparin drip)   ROM Restrictions   (spine precautions)   Braces or Orthoses C/S Collar   Cognition   Arousal/Participation Cooperative   Subjective   Subjective pt reported he feels tired today and didn't sleep very well  reviewed positioning in bed in sidelying; taped a towel roll to the pillow and then put the pillow case over; pt rpeorted this was comfortable with extra support for his neck  Labeled the pillow case so staff know to leave that pillow in his room, but to change the pillow case as needed    Roll Left and Right   Type of Assistance Needed Physical assistance   Physical Assistance Level 25% or less   Roll Left and Right CARE Score 3   Sit to Lying   Type of Assistance Needed Supervision   Sit to Lying CARE Score 4   Lying to Sitting on Side of Bed   Type of Assistance Needed Verbal cues   Lying to Sitting on Side of Bed CARE Score 4   Sit to Stand   Type of Assistance Needed Adaptive equipment   Physical Assistance Level 25% or less   Sit to Stand CARE Score 3   Bed-Chair Transfer   Type of Assistance Needed Physical assistance; Adaptive equipment   Physical Assistance Level 25% or less   Chair/Bed-to-Chair Transfer CARE Score 3   Transfer Bed/Chair/Wheelchair   Limitations Noted In Balance; Endurance; Coordination;LE Strength   Adaptive Equipment None   Stand Pivot Minimal Assist   Sit to Stand Minimal Assist   Stand to Sit Minimal Assist   Supine to Sit Supervision   Sit to Supine Supervision   Therapeutic Interventions   Neuromuscular Re-Education worked on sit<>stands from recliner chair to work on initial satnding balance, feet together eyes open and pt swayed to keep his balance; feet hip width apart and eyes closed; pt again swayed to keep balance  education on need to work on vestibular/somatosensory components of balance as he can't use his vision to full capacity at this time with dashawn c-collar on  Pt demonstrated undersatnding  Reviewed will work on balance while he is here, but currently would recommend that he uses a RW at home as a fall prevention tool  Based upon progress that reocmmendation may change  Other pt reported that on the left side of his chin there is an area that is sore to the touch, and it seems to be due to the collar  Started session reviewing log rolling on the bed, trial of towel roll for neck support, which helps, and then transfered to recliner chair  changed front collar pads that were dirty,and then readjusted the collar and pt reported there was no pressure on the sore area any more  When taking off the back part of the brace, noticed blood on the ABD  Notified Henna CURIEL and Ross Delatorre, who changed the dressing and assessed incision site  Changed the back pad, and again readjusted the collar  Pt reported feeling comfortable in the collar with no pressure on the sore spot  Washed all pads and set out to dry  Assessment   Treatment Assessment Pt demonstrates good ability to remember spine precuatins and log rolling when performing bed mobility  Pt cont to have deficits in righting reactions and dynamic balance, and balance training needs to be a focus on PT POC  To cont to work on improvng gait speed, activity tolerance, BLE strength to improve functional mobility and safety  Barriers to Discharge Inaccessible home environment  (wife able to help wth household chores)   PT Barriers   Physical Impairment Decreased strength;Decreased range of motion;Decreased endurance; Impaired balance;Decreased mobility;Pain;Orthopedic restrictions; Impaired sensation   Functional Limitation Car transfers;Stair negotiation;Standing;Transfers; Walking   Plan   Treatment/Interventions Functional transfer training;LE strengthening/ROM; Elevations; Therapeutic exercise; Endurance training;Patient/family training;Equipment eval/education; Bed mobility;Gait training   Progress Progressing toward goals   Recommendation   PT Discharge Recommendation Home with outpatient rehabilitation   Equipment Recommended Walker   PT Therapy Minutes   PT Time In 0835   PT Time Out 0935   PT Total Time (minutes) 60   PT Mode of treatment - Individual (minutes) 60   PT Mode of treatment - Concurrent (minutes) 0   PT Mode of treatment - Group (minutes) 0   PT Mode of treatment - Co-treat (minutes) 0   PT Mode of Treatment - Total time(minutes) 60 minutes   PT Cumulative Minutes 150   Therapy Time missed   Time missed?  No

## 2022-03-20 NOTE — PLAN OF CARE
Problem: PAIN - ADULT  Goal: Verbalizes/displays adequate comfort level or baseline comfort level  Description: Interventions:  - Encourage patient to monitor pain and request assistance  - Assess pain using appropriate pain scale  - Administer analgesics based on type and severity of pain and evaluate response  - Implement non-pharmacological measures as appropriate and evaluate response  - Consider cultural and social influences on pain and pain management  - Notify physician/advanced practitioner if interventions unsuccessful or patient reports new pain  Outcome: Progressing     Problem: INFECTION - ADULT  Goal: Absence or prevention of progression during hospitalization  Description: INTERVENTIONS:  - Assess and monitor for signs and symptoms of infection  - Monitor lab/diagnostic results  - Monitor all insertion sites, i e  indwelling lines, tubes, and drains  - Monitor endotracheal if appropriate and nasal secretions for changes in amount and color  - [place] appropriate cooling/warming therapies per order  - Administer medications as ordered  - Instruct and encourage patient and family to use good hand hygiene technique  - Identify and instruct in appropriate isolation precautions for identified infection/condition  Outcome: Progressing  Goal: Absence of fever/infection during neutropenic period  Description: INTERVENTIONS:  - Monitor WBC    Outcome: Progressing     Problem: SAFETY ADULT  Goal: Patient will remain free of falls  Description: INTERVENTIONS:  - Educate patient/family on patient safety including physical limitations  - Instruct patient to call for assistance with activity   - Consult OT/PT to assist with strengthening/mobility   - Keep Call bell within reach  - Keep bed low and locked with side rails adjusted as appropriate  - Keep care items and personal belongings within reach  - Initiate and maintain comfort rounds  - Make Fall Risk Sign visible to staff  - Offer Toileting every Hours, in advance of need  - Initiate/Maintain alarm  - Obtain necessary fall risk management equipment:   - Apply yellow socks and bracelet for high fall risk patients  - Consider moving patient to room near nurses station  Outcome: Progressing  Goal: Maintain or return to baseline ADL function  Description: INTERVENTIONS:  -  Assess patient's ability to carry out ADLs; assess patient's baseline for ADL function and identify physical deficits which impact ability to perform ADLs (bathing, care of mouth/teeth, toileting, grooming, dressing, etc )  - Assess/evaluate cause of self-care deficits   - Assess range of motion  - Assess patient's mobility; develop plan if impaired  - Assess patient's need for assistive devices and provide as appropriate  - Encourage maximum independence but intervene and supervise when necessary  - Involve family in performance of ADLs  - Assess for home care needs following discharge   - Consider OT consult to assist with ADL evaluation and planning for discharge  - Provide patient education as appropriate  Outcome: Progressing  Goal: Maintains/Returns to pre admission functional level  Description: INTERVENTIONS:  - Perform BMAT or MOVE assessment daily    - Set and communicate daily mobility goal to care team and patient/family/caregiver  - Collaborate with rehabilitation services on mobility goals if consulted  - Perform Range of Motion  times a day  - Reposition patient every  hours    - Dangle patient  times a day  - Stand patient imes a day  - Ambulate patient times a day  - Out of bed to chairtimes a day   - Out of bed for meals times a day  - Out of bed for toileting  - Record patient progress and toleration of activity level   Outcome: Progressing     Problem: DISCHARGE PLANNING  Goal: Discharge to home or other facility with appropriate resources  Description: INTERVENTIONS:  - Identify barriers to discharge w/patient and caregiver  - Arrange for needed discharge resources and transportation as appropriate  - Identify discharge learning needs (meds, wound care, etc )  - Arrange for interpretive services to assist at discharge as needed  - Refer to Case Management Department for coordinating discharge planning if the patient needs post-hospital services based on physician/advanced practitioner order or complex needs related to functional status, cognitive ability, or social support system  Outcome: Progressing     Problem: Knowledge Deficit  Goal: Patient/family/caregiver demonstrates understanding of disease process, treatment plan, medications, and discharge instructions  Description: Complete learning assessment and assess knowledge base    Interventions:  - Provide teaching at level of understanding  - Provide teaching via preferred learning methods  Outcome: Progressing     Problem: Potential for Falls  Goal: Patient will remain free of falls  Description: INTERVENTIONS:  - Educate patient/family on patient safety including physical limitations  - Instruct patient to call for assistance with activity   - Consult OT/PT to assist with strengthening/mobility   - Keep Call bell within reach  - Keep bed low and locked with side rails adjusted as appropriate  - Keep care items and personal belongings within reach  - Initiate and maintain comfort rounds  - Make Fall Risk Sign visible to staff  - Offer Toileting every  Hours, in advance of need  - Initiate/Maintain alarm  - Obtain necessary fall risk management equipment:   - Apply yellow socks and bracelet for high fall risk patients  - Consider moving patient to room near nurses station  Outcome: Progressing

## 2022-03-20 NOTE — QUICK NOTE
S/P Anterior cervical discectomy and fixation fusion C5/6 and C6/7; Posterior cervical decompression and instrumented fusion C3-T1 by Dr Fabrice Drummond 3/11/22  Contacted by [facility] provider Dr Maria C Varner  Reported profuse drainage from his incision  Upon inspection, there was no active bleeding appreciated until I added pressure to the midline of his incision  Some blood was noted  Blood seen and gauze and ABD pad  Appears to be falling down  No signs of an opening or  dehiscence  Plan:  Would continue frequent dressing changes using multiple gauze and ABD pad to cover  The paper tape seen appeared to be effective  Neurosurgery will sign off for now  Thank you for allowing us to participate in the care of this patient  We will follow up with this patient likewise in an outpatient setting  Please excuse any grammatical or spelling errors as this was written using a dictation sytem  Please call with any questions or concerns

## 2022-03-20 NOTE — PROGRESS NOTES
03/20/22 1000   Pain Assessment   Pain Assessment Tool 0-10   Pain Score 3   Pain Location/Orientation Location: Neck   Pain Onset/Description Descriptor: [facility] Pain Intervention(s) Ambulation/increased activity; Rest   Restrictions/Precautions   Precautions Fall Risk;Hard of hearing;Multiple lines;Pain;Spinal precautions   Weight Bearing Restrictions No   ROM Restrictions Yes  (cervical spinal precautions)   Braces or Orthoses C/S Collar   Lifestyle   Autonomy "I need to be able to take care of myself before I can take care of my wife"   Oral Hygiene   Type of Assistance Needed Incidental touching   Physical Assistance Level No physical assistance   Comment CG in stance at sink   Oral Hygiene CARE Score 4   Upper Body Dressing   Type of Assistance Needed Physical assistance   Physical Assistance Level 76% or more   Comment Max assist to doff shirt, mod assist to don shirt; did not complete collar management this date however anticipate that pt would need total assist/Ax2  Discussed with pt that education would be provided to pt and spouse prior to d/c regarding collar management   Upper Body Dressing CARE Score 2   Putting On/Taking Off Footwear   Type of Assistance Needed Supervision   Physical Assistance Level No physical assistance   Comment to don socks seated using cross leg tech   Putting On/Taking Off Footwear CARE Score 4   Sit to Stand   Type of Assistance Needed Incidental touching   Physical Assistance Level No physical assistance   Comment CG with RW   Sit to Stand CARE Score 4   Bed-Chair Transfer   Type of Assistance Needed Incidental touching   Physical Assistance Level No physical assistance   Comment CG with RW   Chair/Bed-to-Chair Transfer CARE Score 4   Cognition   Overall Cognitive Status WFL   Arousal/Participation Alert; Cooperative   Attention Attends with cues to redirect   Orientation Level Oriented X4   Memory Within functional limits   Following Commands Follows multistep commands with increased time or repetition   Activity Tolerance   Activity Tolerance Patient tolerated treatment well   Assessment   Treatment Assessment Pt engages in 60 minute skilled OT session focusing self care tasks, func transfers and RW safety  See above for full func details  Pt tolerates session well with minimal c/o burning sensation in neck area  Dr Mai Dsouza present and reports awaiting neuro surgery to come and examine/assess pt however pt is OK to be seen per Dr Mai Dsouza  Pt completes all func mobility with RW and overall CG, min VCs for safe hand placement and VCs for adequate space between feet when walking as pt tends to walk with feet very close together and noted to cross over the other foot when turn making putting pt a high fall risk  Pt reports significant improvement in overall func since receiving neck surgery and reports he is walking better and has more strength and sensation than PTA  Recommend continued skilled care to focus on ADL retraining, c/s collar management, func transfers, standing jaylon/bal, IADLs, family training, in order to decrease burden of care at d/c  Prognosis Excellent   Problem List Decreased strength;Decreased range of motion;Decreased endurance; Impaired balance;Decreased mobility; Decreased coordination;Decreased skin integrity;Orthopedic restrictions;Pain   Barriers to Discharge Inaccessible home environment;Decreased caregiver support   Plan   Treatment/Interventions ADL retraining;Functional transfer training; Therapeutic exercise; Endurance training;Patient/family training;Equipment eval/education; Compensatory technique education   OT Therapy Minutes   OT Time In 1000   OT Time Out 1100   OT Total Time (minutes) 60   OT Mode of treatment - Individual (minutes) 60   OT Mode of treatment - Concurrent (minutes) 0   OT Mode of treatment - Group (minutes) 0   OT Mode of treatment - Co-treat (minutes) 0   OT Mode of Treatment - Total time(minutes) 60 minutes   OT Cumulative Minutes 150   Therapy Time missed   Time missed?  No

## 2022-03-20 NOTE — PLAN OF CARE
Problem: PAIN - ADULT  Goal: Verbalizes/displays adequate comfort level or baseline comfort level  Description: Interventions:  - Encourage patient to monitor pain and request assistance  - Assess pain using appropriate pain scale  - Administer analgesics based on type and severity of pain and evaluate response  - Implement non-pharmacological measures as appropriate and evaluate response  - Consider cultural and social influences on pain and pain management  - Notify physician/advanced practitioner if interventions unsuccessful or patient reports new pain  Outcome: Progressing     Problem: INFECTION - ADULT  Goal: Absence or prevention of progression during hospitalization  Description: INTERVENTIONS:  - Assess and monitor for signs and symptoms of infection  - Monitor lab/diagnostic results  - Monitor all insertion sites, i e  indwelling lines, tubes, and drains  - Monitor endotracheal if appropriate and nasal secretions for changes in amount and color  - [place] appropriate cooling/warming therapies per order  - Administer medications as ordered  - Instruct and encourage patient and family to use good hand hygiene technique  - Identify and instruct in appropriate isolation precautions for identified infection/condition  Outcome: Progressing  Goal: Absence of fever/infection during neutropenic period  Description: INTERVENTIONS:  - Monitor WBC    Outcome: Progressing     Problem: SAFETY ADULT  Goal: Patient will remain free of falls  Description: INTERVENTIONS:  - Educate patient/family on patient safety including physical limitations  - Instruct patient to call for assistance with activity   - Consult OT/PT to assist with strengthening/mobility   - Keep Call bell within reach  - Keep bed low and locked with side rails adjusted as appropriate  - Keep care items and personal belongings within reach  - Initiate and maintain comfort rounds  - Make Fall Risk Sign visible to staff  - Offer Toileting every  Hours, in advance of need  - Initiate/Maintain alarm  - Obtain necessary fall risk management equipment:   - Apply yellow socks and bracelet for high fall risk patients  - Consider moving patient to room near nurses station  Outcome: Progressing  Goal: Maintain or return to baseline ADL function  Description: INTERVENTIONS:  -  Assess patient's ability to carry out ADLs; assess patient's baseline for ADL function and identify physical deficits which impact ability to perform ADLs (bathing, care of mouth/teeth, toileting, grooming, dressing, etc )  - Assess/evaluate cause of self-care deficits   - Assess range of motion  - Assess patient's mobility; develop plan if impaired  - Assess patient's need for assistive devices and provide as appropriate  - Encourage maximum independence but intervene and supervise when necessary  - Involve family in performance of ADLs  - Assess for home care needs following discharge   - Consider OT consult to assist with ADL evaluation and planning for discharge  - Provide patient education as appropriate  Outcome: Progressing  Goal: Maintains/Returns to pre admission functional level  Description: INTERVENTIONS:  - Perform BMAT or MOVE assessment daily    - Set and communicate daily mobility goal to care team and patient/family/caregiver  - Collaborate with rehabilitation services on mobility goals if consulted  - Perform Range of Motion  times a day  - Reposition patient every hours    - Dangle patient  times a day  - Stand patient  times a day  - Ambulate patient times a day  - Out of bed to chair  times a day   - Out of bed for meals  times a day  - Out of bed for toileting  - Record patient progress and toleration of activity level   Outcome: Progressing     Problem: DISCHARGE PLANNING  Goal: Discharge to home or other facility with appropriate resources  Description: INTERVENTIONS:  - Identify barriers to discharge w/patient and caregiver  - Arrange for needed discharge resources and transportation as appropriate  - Identify discharge learning needs (meds, wound care, etc )  - Arrange for interpretive services to assist at discharge as needed  - Refer to Case Management Department for coordinating discharge planning if the patient needs post-hospital services based on physician/advanced practitioner order or complex needs related to functional status, cognitive ability, or social support system  Outcome: Progressing     Problem: Knowledge Deficit  Goal: Patient/family/caregiver demonstrates understanding of disease process, treatment plan, medications, and discharge instructions  Description: Complete learning assessment and assess knowledge base    Interventions:  - Provide teaching at level of understanding  - Provide teaching via preferred learning methods  Outcome: Progressing     Problem: Potential for Falls  Goal: Patient will remain free of falls  Description: INTERVENTIONS:  - Educate patient/family on patient safety including physical limitations  - Instruct patient to call for assistance with activity   - Consult OT/PT to assist with strengthening/mobility   - Keep Call bell within reach  - Keep bed low and locked with side rails adjusted as appropriate  - Keep care items and personal belongings within reach  - Initiate and maintain comfort rounds  - Make Fall Risk Sign visible to staff  - Offer Toileting every Hours, in advance of need  - Initiate/Maintain alarm  - Obtain necessary fall risk management equipment:   - Apply yellow socks and bracelet for high fall risk patients  - Consider moving patient to room near nurses station  Outcome: Progressing

## 2022-03-20 NOTE — PROGRESS NOTES
Internal Medicine Progress Note  Patient: Marko Shown  Age/sex: 71 y o  male  Medical Record #: [id number]      ASSESSMENT/PLAN: (Interval History)  Orma Shown is seen and examined and management for following issues:    S/p anterior cervical discectomy and fixation fusion C5-6 and C6-7, posterior cervical decompression and instrumented fusion C3-T1  · Aspen collar at all times  · Decadron completed  · Pain control and therapy per PMR  · Pt found to have bleeding from the proximal portion of the posterior incision with bruising  Heparin drip stopped  NS contacted and will see pt  Pics in media section  PAF  · INR pending  · Pt takes Coumadin 5mg Wed and Sat and 2 5mg Mon, Tues, Thurs, Fri and Sun  · DC Heparin drip due to bruising and bleeding from incision  Expect INR to be > 2 today  HTN  · Has been elevated during hospitalization possible due to steroids  · Continue amlodipine 5mg 2x daily, hydralazine 50mg every 8 hours and Toprol XL 100mg daily  · If BP improves once steroids are completed will decrease hydralazine first    · Monitor BP every shift  Leiden Factor V  · DC Heparin drip when INR > 2   · Coumadin as above  Hyponatremia  · Na 130  · Will continue to monitor  Leukocytosis  · Mild  Likely reactive  No signs of infx  The above assessment and plan was reviewed and updated as determined by my evaluation of the patient on 3/20/2022      Labs:   Results from last 7 days   Lab Units 03/19/22  0529 03/18/22  1148   WBC Thousand/uL 11 86* 13 18*   HEMOGLOBIN g/dL 12 3 12 6   HEMATOCRIT % 37 3 37 6   PLATELETS Thousands/uL 222 201     Results from last 7 days   Lab Units 03/18/22  1148 03/17/22  0539   SODIUM mmol/L 130* 134*   POTASSIUM mmol/L 3 7 3 9   CHLORIDE mmol/L 100 99*   CO2 mmol/L 24 28   BUN mg/dL 16 13   CREATININE mg/dL 0 60 0 70   CALCIUM mg/dL 9 2 9 2         Results from last 7 days   Lab Units 03/19/22  0529 03/18/22  1148   INR  1 97* 1 87*     Results from last 7 days   Lab Units 03/20/22  0537 03/19/22  2153 03/19/22  1836   POC GLUCOSE mg/dl 114 141* 190*       Review of Scheduled Meds:  Current Facility-Administered Medications   Medication Dose Route Frequency Provider Last Rate    acetaminophen  975 mg Oral [facility] Chris Sullivan MD      amLODIPine  5 mg Oral BID Chris Sullivan MD      atorvastatin  40 mg Oral QPM Chris Sullivan MD      bisacodyl  10 mg Rectal Daily PRN Chris Sullivan MD      calcium carbonate  1,000 mg Oral Daily PRN Chris Sullivan MD      cyclobenzaprine  5 mg Oral TID PRN Chris Sullivan MD      Diclofenac Sodium  2 g Topical 4x Daily Chris Sullivan MD      docusate sodium  100 mg Oral BID Chris Sullivan MD      DULoxetine  60 mg Oral HS Chris Sullivan MD      flecainide  100 mg Oral BID Chris Sullivan MD      heparin (porcine)  3-30 Units/kg/hr (Order-Specific) Intravenous Titrated Chris Sullivan MD 6 Units/kg/hr (03/20/22 0431)    heparin (porcine)  4,400 Units Intravenous Q1H PRN Chris Sullivan MD      heparin (porcine)  8,800 Units Intravenous Q1H PRN Chris Sullivan MD      hydrALAZINE  50 mg Oral [facility] Chris Sullivan MD      insulin lispro  1-5 Units Subcutaneous HS Chris Sullivan MD      insulin lispro  2-12 Units Subcutaneous 4 times day Chris Sullivan MD      lidocaine   Topical 4x Daily PRN Chris Sullivan MD      methocarbamol  500 mg Oral Q6H [address] Chris Sullivan MD      metoprolol succinate  100 mg Oral Daily Chris Sullivan MD      oxyCODONE  2 5 mg Oral Q4H PRN Chris Sullivan MD      oxyCODONE  5 mg Oral Q4H PRN Chris Sullivan MD      pantoprazole  40 mg Oral Early Morning Chris Sullivan MD      polyethylene glycol  17 g Oral Daily PRN Chris Sullivan MD      senna  1 tablet Oral Daily Chris Sullivan MD      warfarin  2 5 mg Oral Once per day on Sun Mon Tue Thu Fri Henna Hsieh PA-C      warfarin  5 mg Oral Once per day on Wed Sat Henna Hsieh PA-C         Subjective/ HPI: Patient seen and examined  Patients overnight issues or events were reviewed with nursing or staff during rounds or morning huddle session  New or overnight issues include the following:     Pt seen in his room  He denies any current complaints  Pt found to be bleeding from his incision when PT was changing out Emblem collar pads  Contacted Dr Claudio Barth and NS  Pic uploaded to the media section  ROS:   A 10 point ROS was performed; negative except as noted above  Imaging:     No orders to display       *Labs /Radiology studies Reviewed  *Medications  reviewed and reconciled as needed  *Please refer to order section for additional ordered labs studies  *Case discussed with primary attending during morning huddle case rounds    Physical Examination:  Vitals:   Vitals:    03/19/22 1421 03/19/22 2330 03/20/22 0521 03/20/22 0737   BP: 128/67 129/67 110/60 142/75   BP Location: Left arm Right arm  Left arm   Pulse: 59 61  74   Resp: 19 18 18   Temp: 98 4 °F (36 9 °C) 98 3 °F (36 8 °C)  (!) 97 4 °F (36 3 °C)   TempSrc: Oral Oral  Oral   SpO2: 96% 95%  96%   Weight:       Height:         General Appearance: no distress, conversive  HEENT: PERRLA, conjuctiva normal; oropharynx clear; mucous membranes moist;   Neck:  Aspen collar intact  Dressing found to have blood soaked through ABD at proximal portion of the posterior incision  Bruising around incision  Pic in media  Lungs: CTA, normal respiratory effort, no retractions, expiratory effort normal  CV: regular rate and rhythm , PMI normal   ABD: soft non tender, no masses , no hepatic or splenomegaly  EXT: DP pulses intact, no lymphadenopathy, no edema  Skin: normal turgor, normal texture, no rash  Psych: affect normal, mood normal  Neuro: AAOx3      The above physical exam was reviewed and updated as determined by my evaluation of the patient on 3/20/2022  Invasive Devices  Report    Peripheral Intravenous Line            Peripheral IV 03/17/22 Dorsal (posterior); Right Forearm 3 days                   VTE Pharmacologic Prophylaxis: Heparin and Warfarin (Coumadin)  Code Status: Level 3 - DNAR and DNI  Current Length of Stay: 2 day(s)      Total time spent:  30 minutes with more than 50% spent counseling/coordinating care  Counseling includes discussion with patient re: progress  and discussion with patient of his/her current medical state/information  Coordination of patient's care was performed in conjunction with primary service  Time invested included review of patient's labs, vitals, and management of their comorbidities with continued monitoring  In addition, this patient was discussed with medical team including physician and advanced extenders  The care of the patient was extensively discussed and appropriate treatment plan was formulated unique for this patient  ** Please Note:  voice to text software may have been used in the creation of this document   Although proof errors in transcription or interpretation are a potential of such software**

## 2022-03-20 NOTE — PROGRESS NOTES
PM&R Coverage Progress Note:    Rehabilitation Diagnosis: Spinal Cord Dysfunction:Non-Traumatic:  04 130 Other Non-Traumatic  Etiologic Diagnosis:  Cervical myelopathy     ASSESSMENT: Stable      PLAN:    Rehabilitation   Continue current rehabilitation plan of care to maximize function   No funcitonal barriers identified    Medical issues   Ecchymosis around incision site with blood soaked over the abd covering  Heparin gtt stopped and INR returned therapeutic afterwards at 2 31  Continue Warfarin dosing per IM   Neurosurgery was contacted and will see patient  At this moment, full strength exam and no clinical signs for deterioration from a neurologic perspective  Will continue current plan of care until neurosurgery sees   Olamide has completed   Continue current medical plan of care  Appreciate IM consultants co-management  SUBJECTIVE: Patient seen and examined in chair in room  No acute issues per patient and actually feels pretty good today despite bleeding from incision site  He denies fever, chills, lightheadedness, dizziness, balance issues, bowel or bladder incontinence, numbness, weakness  ROS:  A ten point review of systems was completed on 03/20/22 and pertinent positives are listed in subjective section  All other systems reviewed were negative  OBJECTIVE:   /57   Pulse 62   Temp (!) 97 4 °F (36 3 °C) (Oral)   Resp 18   Ht 5' 11" (1 803 m)   Wt 105 kg (231 lb 7 7 oz)   SpO2 96%   BMI 32 29 kg/m²     Physical Exam  Constitutional:       General: He is not in acute distress  Appearance: Normal appearance  HENT:      Head: Normocephalic and atraumatic  Right Ear: External ear normal       Left Ear: External ear normal       Nose: Nose normal  No rhinorrhea  Mouth/Throat:      Mouth: Mucous membranes are moist       Pharynx: Oropharynx is clear  Eyes:      General: No scleral icterus    Neck:      Comments: Cervical collar in place, steri strips over anterior neck  See pictures in media for posterior incision site ecchymosis  Cardiovascular:      Rate and Rhythm: Normal rate  Pulses: Normal pulses  Pulmonary:      Effort: Pulmonary effort is normal  No respiratory distress  Breath sounds: No wheezing or rales  Abdominal:      General: There is no distension  Palpations: Abdomen is soft  Musculoskeletal:      Right lower leg: No edema  Left lower leg: No edema  Skin:     General: Skin is warm and dry  Findings: Bruising present  Neurological:      Mental Status: He is alert and oriented to person, place, and time  Comments: 5/5 strength in the elbow flexors/extensors, wrist extensors bilaterally  5/5 strength in the bilateral hip flexors, knee extensors, and ankle dorsiflexors     Psychiatric:         Mood and Affect: Mood normal          Behavior: Behavior normal           Lab Results   Component Value Date    WBC 11 86 (H) 03/19/2022    HGB 12 3 03/19/2022    HCT 37 3 03/19/2022    MCV 93 03/19/2022     03/19/2022     Lab Results   Component Value Date    SODIUM 130 (L) 03/18/2022    K 3 7 03/18/2022     03/18/2022    CO2 24 03/18/2022    BUN 16 03/18/2022    CREATININE 0 60 03/18/2022    GLUC 115 03/18/2022    CALCIUM 9 2 03/18/2022     Lab Results   Component Value Date    INR 2 31 (H) 03/20/2022    INR 1 97 (H) 03/19/2022    INR 1 87 (H) 03/18/2022    PROTIME 24 3 (H) 03/20/2022    PROTIME 21 4 (H) 03/19/2022    PROTIME 20 7 (H) 03/18/2022           Current Facility-Administered Medications:     acetaminophen (TYLENOL) tablet 975 mg, 975 mg, Oral, Q8H [facility], Issac Mendes MD, 975 mg at 03/20/22 0521    amLODIPine (NORVASC) tablet 5 mg, 5 mg, Oral, BID, Issac Mendes MD, 5 mg at 03/20/22 1014    atorvastatin (LIPITOR) tablet 40 mg, 40 mg, Oral, QPM, Issac Mendes MD, 40 mg at 03/19/22 1729    bisacodyl (DULCOLAX) rectal suppository 10 mg, 10 mg, Rectal, Daily PRN, Issac Mendes MD    calcium carbonate (TUMS) chewable tablet 1,000 mg, 1,000 mg, Oral, Daily PRN, Joelle Davies MD    cyclobenzaprine (FLEXERIL) tablet 5 mg, 5 mg, Oral, TID PRN, Joelle Davies MD    Diclofenac Sodium (VOLTAREN) 1 % topical gel 2 g, 2 g, Topical, 4x Daily, Joelle Davies MD, 2 g at 03/19/22 2153    docusate sodium (COLACE) capsule 100 mg, 100 mg, Oral, BID, Joelle Davies MD, 100 mg at 03/20/22 1014    DULoxetine (CYMBALTA) delayed release capsule 60 mg, 60 mg, Oral, HS, Joelle Davies MD, 60 mg at 03/19/22 2148    flecainide (TAMBOCOR) tablet 100 mg, 100 mg, Oral, BID, Joelle Davies MD, 100 mg at 03/20/22 1013    hydrALAZINE (APRESOLINE) tablet 50 mg, 50 mg, Oral, Q8H [address], Joelle Davies MD, 50 mg at 03/20/22 0521    insulin lispro (HumaLOG) 100 units/mL subcutaneous injection 1-5 Units, 1-5 Units, Subcutaneous, HS, Joelle Davies MD    insulin lispro (HumaLOG) 100 units/mL subcutaneous injection 2-12 Units, 2-12 Units, Subcutaneous, 4 times day, 2 Units at 03/19/22 1425 **AND** Fingerstick Glucose (POCT), , , 4 times day, Joelle Davies MD    lidocaine (LMX) 4 % cream, , Topical, 4x Daily PRN, Joelle Davies MD    methocarbamol (ROBAXIN) tablet 500 mg, 500 mg, Oral, Q6H [address], Joelle Davies MD, 500 mg at 03/20/22 7030    metoprolol succinate (TOPROL-XL) 24 hr tablet 100 mg, 100 mg, Oral, Daily, Joelle Davies MD, 100 mg at 03/20/22 1015    oxyCODONE (ROXICODONE) IR tablet 2 5 mg, 2 5 mg, Oral, Q4H PRN, Joelle Davies MD    oxyCODONE (ROXICODONE) IR tablet 5 mg, 5 mg, Oral, Q4H PRN, Joelle Davies MD, 5 mg at 03/19/22 1049    pantoprazole (PROTONIX) EC tablet 40 mg, 40 mg, Oral, Early Morning, Joelle Davies MD, 40 mg at 03/20/22 0521    polyethylene glycol (MIRALAX) packet 17 g, 17 g, Oral, Daily PRN, Joelle Davies MD, 17 g at 03/19/22 1049    senna (SENOKOT) tablet 8 6 mg, 1 tablet, Oral, Daily, Joelle Davies MD, 8 6 mg at 03/20/22 1014    warfarin (COUMADIN) tablet 2 5 mg, 2 5 mg, Oral, Once per day on [address] Fri, Henna Hsieh PA-C    warfarin (COUMADIN) tablet 5 mg, 5 mg, Oral, Once per day on Wed Sat, Henna Hsieh PA-C, 5 mg at 03/19/22 1729    Past Medical History:   Diagnosis Date    Acute venous embolism and thrombosis of deep vessels of proximal lower extremity (HCC)     Last assessed: 5/18/15    Arthritis     Cellulitis     LE    CPAP (continuous positive airway pressure) dependence     [facility] Leiden ([facility] )     Forgetfulness     [place] (hard of hearing)     Paroxysmal atrial fibrillation ([facility] [address] )     Last assessed: 11/2/15    Sleep apnea        Patient Active Problem List    Diagnosis Date Noted    Pain 03/18/2022    Muscle spasm 03/14/2022    Leukocytosis 03/13/2022    Goals of care, counseling/discussion 03/11/2022    Sinus bradycardia 03/10/2022    Cervical myelopathy ([facility] ) 03/05/2022    Ambulatory dysfunction 03/04/2022    Weakness 03/04/2022    Pes anserinus bursitis of right knee 12/14/2021    IFG (impaired fasting glucose) 06/14/2021    Memory difficulty 03/10/2021    History of DVT of lower extremity 01/19/2021    Mixed hyperlipidemia 10/27/2020    Paroxysmal A-fib ([facility] [facility] ) 09/08/2020    Lower extremity edema 09/08/2020    Primary osteoarthritis of left knee 06/05/2020    Primary osteoarthritis of right knee 06/05/2020    Depression, major, single episode, moderate ([address] ) 10/30/2017    Insomnia 03/10/2017    Lumbar herniated disc 03/10/2017    Erectile dysfunction 02/09/2016    Status post catheter ablation of atrial flutter 01/29/2015    Dyslipidemia 01/09/2015    Essential hypertension 01/09/2015    WILL (obstructive sleep apnea) 01/09/2015    Factor V Leiden mutation ([facility] [address] ) 01/03/2014        Kaushik Sy,   Physical Medicine and [address]      Total time spent:  20 minutes with more than 50% spent counseling/coordinating care   Counseling includes discussion with patient re: progress and discussion with patient of his/her current medical/functional state/information  Coordination of patient's care was performed in conjunction with consulting services  Time invested included review of patient's labs, vitals, and management of their comorbidities with continued monitoring  The care of the patient was extensively discussed and appropriate treatment plan was formulated unique for this patient  ** Please Note:  voice to text software may have been used in the creation of this document   Although proof errors in transcription or interpretation are a potential of such software**

## 2022-03-21 PROBLEM — R39.198 URINARY DYSFUNCTION: Status: ACTIVE | Noted: 2022-03-21

## 2022-03-21 PROBLEM — E87.1 HYPONATREMIA: Status: ACTIVE | Noted: 2022-03-21

## 2022-03-21 LAB
ANION GAP SERPL CALCULATED.3IONS-SCNC: 8 MMOL/L (ref 4–13)
BASOPHILS # BLD AUTO: 0.01 THOUSANDS/ΜL (ref 0–0.1)
BASOPHILS NFR BLD AUTO: 0 % (ref 0–1)
BUN SERPL-MCNC: 13 MG/DL (ref 5–25)
CALCIUM SERPL-MCNC: 9.3 MG/DL (ref 8.3–10.1)
CHLORIDE SERPL-SCNC: 97 MMOL/L (ref 100–108)
CO2 SERPL-SCNC: 27 MMOL/L (ref 21–32)
CREAT SERPL-MCNC: 0.6 MG/DL (ref 0.6–1.3)
EOSINOPHIL # BLD AUTO: 0.09 THOUSAND/ΜL (ref 0–0.61)
EOSINOPHIL NFR BLD AUTO: 1 % (ref 0–6)
ERYTHROCYTE [DISTWIDTH] IN BLOOD BY AUTOMATED COUNT: 13.2 % (ref 11.6–15.1)
GFR SERPL CREATININE-BSD FRML MDRD: 101 ML/MIN/1.73SQ M
GLUCOSE SERPL-MCNC: 108 MG/DL (ref 65–140)
GLUCOSE SERPL-MCNC: 112 MG/DL (ref 65–140)
GLUCOSE SERPL-MCNC: 171 MG/DL (ref 65–140)
HCT VFR BLD AUTO: 33.7 % (ref 36.5–49.3)
HGB BLD-MCNC: 12.1 G/DL (ref 12–17)
IMM GRANULOCYTES # BLD AUTO: 0.05 THOUSAND/UL (ref 0–0.2)
IMM GRANULOCYTES NFR BLD AUTO: 1 % (ref 0–2)
INR PPP: 2.87 (ref 0.84–1.19)
LYMPHOCYTES # BLD AUTO: 1.6 THOUSANDS/ΜL (ref 0.6–4.47)
LYMPHOCYTES NFR BLD AUTO: 17 % (ref 14–44)
MCH RBC QN AUTO: 30.8 PG (ref 26.8–34.3)
MCHC RBC AUTO-ENTMCNC: 35.9 G/DL (ref 31.4–37.4)
MCV RBC AUTO: 86 FL (ref 82–98)
MONOCYTES # BLD AUTO: 0.84 THOUSAND/ΜL (ref 0.17–1.22)
MONOCYTES NFR BLD AUTO: 9 % (ref 4–12)
NEUTROPHILS # BLD AUTO: 6.63 THOUSANDS/ΜL (ref 1.85–7.62)
NEUTS SEG NFR BLD AUTO: 72 % (ref 43–75)
NRBC BLD AUTO-RTO: 0 /100 WBCS
PLATELET # BLD AUTO: 245 THOUSANDS/UL (ref 149–390)
PMV BLD AUTO: 9.6 FL (ref 8.9–12.7)
POTASSIUM SERPL-SCNC: 3.7 MMOL/L (ref 3.5–5.3)
PROTHROMBIN TIME: 28.6 SECONDS (ref 11.6–14.5)
RBC # BLD AUTO: 3.93 MILLION/UL (ref 3.88–5.62)
SODIUM SERPL-SCNC: 132 MMOL/L (ref 136–145)
WBC # BLD AUTO: 9.22 THOUSAND/UL (ref 4.31–10.16)

## 2022-03-21 PROCEDURE — 99232 SBSQ HOSP IP/OBS MODERATE 35: CPT

## 2022-03-21 PROCEDURE — 80048 BASIC METABOLIC PNL TOTAL CA: CPT | Performed by: PHYSICIAN ASSISTANT

## 2022-03-21 PROCEDURE — 97530 THERAPEUTIC ACTIVITIES: CPT

## 2022-03-21 PROCEDURE — 97116 GAIT TRAINING THERAPY: CPT

## 2022-03-21 PROCEDURE — 97112 NEUROMUSCULAR REEDUCATION: CPT

## 2022-03-21 PROCEDURE — 85025 COMPLETE CBC W/AUTO DIFF WBC: CPT | Performed by: PHYSICIAN ASSISTANT

## 2022-03-21 PROCEDURE — 85610 PROTHROMBIN TIME: CPT | Performed by: PHYSICIAN ASSISTANT

## 2022-03-21 PROCEDURE — 97535 SELF CARE MNGMENT TRAINING: CPT

## 2022-03-21 PROCEDURE — 99232 SBSQ HOSP IP/OBS MODERATE 35: CPT | Performed by: INTERNAL MEDICINE

## 2022-03-21 PROCEDURE — 82948 REAGENT STRIP/BLOOD GLUCOSE: CPT

## 2022-03-21 RX ORDER — TAMSULOSIN HYDROCHLORIDE 0.4 MG/1
0.4 CAPSULE ORAL
Status: DISCONTINUED | OUTPATIENT
Start: 2022-03-21 | End: 2022-03-29 | Stop reason: HOSPADM

## 2022-03-21 RX ADMIN — WARFARIN SODIUM 2.5 MG: 5 TABLET ORAL at 18:45

## 2022-03-21 RX ADMIN — DOCUSATE SODIUM 100 MG: 100 CAPSULE, LIQUID FILLED ORAL at 08:25

## 2022-03-21 RX ADMIN — FLECAINIDE ACETATE 100 MG: 100 TABLET ORAL at 08:25

## 2022-03-21 RX ADMIN — HYDRALAZINE HYDROCHLORIDE 50 MG: 50 TABLET, FILM COATED ORAL at 21:26

## 2022-03-21 RX ADMIN — METHOCARBAMOL 500 MG: 500 TABLET ORAL at 18:45

## 2022-03-21 RX ADMIN — DICLOFENAC SODIUM 2 G: 10 GEL TOPICAL at 08:28

## 2022-03-21 RX ADMIN — ACETAMINOPHEN 975 MG: 325 TABLET ORAL at 21:25

## 2022-03-21 RX ADMIN — METOPROLOL SUCCINATE 100 MG: 100 TABLET, EXTENDED RELEASE ORAL at 08:25

## 2022-03-21 RX ADMIN — OXYCODONE HYDROCHLORIDE 2.5 MG: 5 TABLET ORAL at 12:25

## 2022-03-21 RX ADMIN — METHOCARBAMOL 500 MG: 500 TABLET ORAL at 12:24

## 2022-03-21 RX ADMIN — AMLODIPINE BESYLATE 5 MG: 5 TABLET ORAL at 08:25

## 2022-03-21 RX ADMIN — HYDRALAZINE HYDROCHLORIDE 50 MG: 50 TABLET, FILM COATED ORAL at 05:46

## 2022-03-21 RX ADMIN — DOCUSATE SODIUM 100 MG: 100 CAPSULE, LIQUID FILLED ORAL at 18:45

## 2022-03-21 RX ADMIN — METHOCARBAMOL 500 MG: 500 TABLET ORAL at 23:55

## 2022-03-21 RX ADMIN — ACETAMINOPHEN 975 MG: 325 TABLET ORAL at 05:46

## 2022-03-21 RX ADMIN — ACETAMINOPHEN 975 MG: 325 TABLET ORAL at 14:56

## 2022-03-21 RX ADMIN — PANTOPRAZOLE SODIUM 40 MG: 40 TABLET, DELAYED RELEASE ORAL at 05:46

## 2022-03-21 RX ADMIN — FLECAINIDE ACETATE 100 MG: 100 TABLET ORAL at 18:46

## 2022-03-21 RX ADMIN — DULOXETINE HYDROCHLORIDE 60 MG: 60 CAPSULE, DELAYED RELEASE ORAL at 21:25

## 2022-03-21 RX ADMIN — AMLODIPINE BESYLATE 5 MG: 5 TABLET ORAL at 21:26

## 2022-03-21 RX ADMIN — ATORVASTATIN CALCIUM 40 MG: 40 TABLET, FILM COATED ORAL at 18:45

## 2022-03-21 RX ADMIN — TAMSULOSIN HYDROCHLORIDE 0.4 MG: 0.4 CAPSULE ORAL at 18:45

## 2022-03-21 RX ADMIN — HYDRALAZINE HYDROCHLORIDE 50 MG: 50 TABLET, FILM COATED ORAL at 14:56

## 2022-03-21 RX ADMIN — METHOCARBAMOL 500 MG: 500 TABLET ORAL at 05:46

## 2022-03-21 NOTE — PCC CARE MANAGEMENT
Pt is participating in therapy  Pt lives with his wife in [place] in a home with 4 MICHELLE  His wife can provide 24/7 supervision and transport but has her own health issues  He has 4 sons who can also help one being very local  He has a walker, a cane, and a BSC  He has has outpt therapy at [facility]  He has no hx or home therapy or STR in SNF  He uses [facility] through mail or [facility] on [address] in [place]  Following to assist w/ d/c planning needs

## 2022-03-21 NOTE — ASSESSMENT & PLAN NOTE
Improved s/p sx  - urinating adequately recenlty   Hx of BPH on chronic flomax  - Home flomax resumed 3/21   - He reported urinary incontinence worsening prior to admission that has been improving since sx  - Noted some impaired sensation > improved  OP PCP follow-up

## 2022-03-21 NOTE — PROGRESS NOTES
Physical Medicine and Rehabilitation Progress Note  Iva Suarez 71 y o  male MRN: [id number]  Unit/Bed#: -01 Encounter: [id number]      Assessment & Plan:     Functional assessment:  Improving        Admit    Recent  Total assist UBD    Significant assist  UBD   Mod assist  To hygiene, bathing, LBD LBD, bathing   Min assist      Contact Guard     Supervision   To hygiene    Mod Indep/Indep     Transfers Mod assist  Min assist-supervision    Ambulation  10 ft significant assist  150 ft significant assist    Stairs  Total assist/NT      Goal: Mod indep with transfers and ambulation; Some mild assist for dressing, bathing  Major barriers:  Incoordination, imbalance, deconditioning   Dispo & ELOS: Home with ELOS 10 days from admission     * Cervical myelopathy [facility])  Assessment & Plan  Function improving; exam stable; pt afebrile    Cervical myelopathy S/P anterior cervical discectomy and fixation fusion C5/6 and C6/7; Posterior cervical decompression and instrumented fusion C3-T1 by Dr Eileen Ruiz on 3/11/22  - Post-op Day 14 is Fri 3/25  - Monitor incision > had some increase serosangious drainage over the weekend; seen by NSx 3/20 - did not feel signs of infection or other changes to mgmt; recommended dressing changes PRN >   - 3/21 - anterior incision unremarkable; posterior incision with some generalized edema and ecchymosis with slight serosanginous discharge without significant increased warmth or tenderness - likely local hematoma > monitor closely > if worsening or looks infected notify NSx again for re-evaluation; neuro exam unchanged > continue to monitor closely  - Aspen cervical collar on at all times except can use [employer] for showers  - Activity Restrictions: No heavy lifting greater than 5 - 10lbs  No strenuous activities  No driving while requiring cervical collar, anticipated six weeks  No significant neck movement    - May shower 3 days after surgery, but do not soak in a tub and no swimming, use mild antimicrobial soap and water  Pat incision dry after showering   - Monitor for neuro changes, dysphagia, neurogenic bowel/bladder, spasticity  - Continue acute comprehensive interdisciplinary inpatient rehabilitation to include intensive skilled therapies (PT, OT) as outlined with oversight and management by rehabilitation physician as well as inpatient rehab level nursing, case management and weekly interdisciplinary team meetings     - Optimal pain management  - Follow-up with Spine Sx and if needed PMR after d/c         Urinary dysfunction  Assessment & Plan  Hx of BPH on chronic flomax  - Was not restarted during this hospitalization;  - restart flomax  - Monitor closely   - He reports urinary incontinence worsening prior to admission that has been improving since sx  - Notes some still impaired sensation   - Has some retention now but not needing SC with some leakage at time  - recommend bladder scans, toileting protocol, PRN SC  - Monitor for infection     Pain  Assessment & Plan  Well controlled thus far  - APAP TID  - PRN oxy 5-10mg Q4H PRN  - Robaxin 500mg Q6H > change to TID soon  - Flexeril 5mg TID spasms   - PRN LD cream for needlesticks         Hyponatremia  Assessment & Plan  Improving 3/21    Sinus bradycardia  Assessment & Plan  IM consulted and with overall management at their discretion during ARC course  Card OP follow-up     Paroxysmal A-fib ([facility] [address] )  Assessment & Plan  IM consulted and with overall management at their discretion during ARC course  Rate control: MTP, flecainide  Antithrombotic:Warfarin     Depression, major, single episode, moderate (HCC)  Assessment & Plan  Cymbalta  Supportive counseling     Factor V Leiden mutation ([facility] [address] )  Assessment & Plan  Warfarin per IM     WILL (obstructive sleep apnea)  Assessment & Plan  CPAP  - If patient unable to use CPAP - spot check O2 while sleeping and provide 2 NC with goal SpO2>91% - titrate as needed  - Consider noct ox    Essential hypertension  Assessment & Plan  Internal medicine consulted and management at their discretion  Monitor vitals with and without activity; monitor for orthostasis  Monitor hemoglobin, electrolytes, kidney function, hydration status   Current meds: Amlodipine, MTP      Dyslipidemia  Assessment & Plan  Statin       Other Medical Issues:   None    Disposition  · Home with ELOS 7-10 days from admission      Follow-up providers and other issues to be followed up after discharge  · PCP  · Neurosx  · Cards     CODE: Level 3: DNAR and DNI per discussion with pt/family     Restrictions include: Fall precautions    Objective:     Allergies per EMR  Diagnostic Studies: Reviewed, no new imaging  No orders to display     See above as well    Laboratory: Labs reviewed  Results from last 7 days   Lab Units 03/21/22  0614 03/19/22  0529 03/18/22  1148   HEMOGLOBIN g/dL 12 1 12 3 12 6   HEMATOCRIT % 33 7* 37 3 37 6   WBC Thousand/uL 9 22 11 86* 13 18*     Results from last 7 days   Lab Units 03/21/22  0614 03/18/22  1148 03/17/22  0539   BUN mg/dL 13 16 13   SODIUM mmol/L 132* 130* 134*   POTASSIUM mmol/L 3 7 3 7 3 9   CHLORIDE mmol/L 97* 100 99*   CREATININE mg/dL 0 60 0 60 0 70     Results from last 7 days   Lab Units 03/21/22  0613 03/20/22  0937 03/19/22  0529   PROTIME seconds 28 6* 24 3* 21 4*   INR  2 87* 2 31* 1 97*        Drug regimen reviewed, all potential adverse effects identified and addressed:    Scheduled Meds:  Current Facility-Administered Medications   Medication Dose Route Frequency Provider Last Rate    acetaminophen  975 mg Oral [facility] Chani Gilmore MD      amLODIPine  5 mg Oral BID Chani Gilmore MD      atorvastatin  40 mg Oral QPM Chani Gilmore MD      bisacodyl  10 mg Rectal Daily PRN Chani Gilmore MD      calcium carbonate  1,000 mg Oral Daily PRN Chani Gilmore MD      cyclobenzaprine  5 mg Oral TID PRN Chani Gilmore MD      Diclofenac Sodium  2 g Topical 4x Daily PRN MD Sherice Tran docusate sodium  100 mg Oral BID Gerardo Ribeiro MD      DULoxetine  60 mg Oral HS Gerardo Ribeiro MD      flecainide  100 mg Oral BID Gerardo Ribeiro MD      hydrALAZINE  50 mg Oral [facility] Gerardo Ribeiro MD      lidocaine   Topical 4x Daily PRN Gerardo Ribeiro MD      methocarbamol  500 mg Oral [facility] Gerardo Ribeiro MD      metoprolol succinate  100 mg Oral Daily Gerardo Ribeiro MD      oxyCODONE  2 5 mg Oral Q4H PRN Gerardo Ribeiro MD      oxyCODONE  5 mg Oral Q4H PRN Gerardo Ribeiro MD      pantoprazole  40 mg Oral Early Morning Gerardo Ribeiro MD      polyethylene glycol  17 g Oral Daily PRN Gerardo Ribeiro MD      senna  1 tablet Oral Daily Gerardo Ribeiro MD      tamsulosin  0 4 mg Oral Daily With Angela Castillo MD      warfarin  2 5 mg Oral Once per day on Sun Mon Tue Thu Fri Henna Hsieh PA-C      warfarin  5 mg Oral Once per day on Wed Sat Ivanna Catalan PA-C         Chief Complaints:  Rehab follow-up    Subjective:     Patient reports some chronic use of flomax  He reports worsening urinary incontinence prior to sx that has been improving some since but now feels like he is having some retention but is eventually able to get it all out  He denies fever, chills, sweats, SOB, calf pain, worsening strength, neck pain, radiating pain, worsening sensation, bowel problems or other complaints  ROS: A 10 point ROS was performed; negative except as noted above         Physical Exam:  Temp:  [97 7 °F (36 5 °C)-97 9 °F (36 6 °C)] 97 9 °F (36 6 °C)  HR:  [57-66] 64  Resp:  [16-18] 16  BP: (123-166)/(66-81) 123/66  SpO2:  [97 %-99 %] 99 %    GEN:  Sitting in NAD   HEENT/NECK: C collar; incision with mild serosangious drainage; some edema and ecchymosis without increased warmth, TTP; or pus; anterior incision unremarkable  CARDIAC: Regular rate rhythm, no murmers, no rubs, no gallops  LUNGS:  clear to auscultation, no wheezes, rales, or rhonchi  ABDOMEN: Soft, non-tender, non-distended, normal active bowel sounds  EXTREMITIES/SKIN:  no calf edema, no calf tenderness to palpation  NEURO:   MENTAL STATUS: awake, oriented to person, place, time, and situation, MENTAL STATUS:  Appropriate wakefulness and interaction  and Strength/MMT:  Near full throughout again  [facility]:  Affect:  Euthymic     HPI:  Mohit Patino is a 71 y o  male with PMH of Afib, HTN, bradycardia, bilateral knee OA, depression, Factor V Leiden, on chronic A/C who developed progressive gait imbalance followed by more acute R sided weakness/sensory changes and imbalance found to have significant significant cervical spinal cord compression from degenerative spondylosis most consistent with cervical myelopathy  Patient underwent anterior cervical discectomy and fixation fusion C5/6 and C6/7; Posterior cervical decompression and instrumented fusion C3-T1 by Dr Shun Richey on 3/11/22  Post-op course notable for significant decline in ADLs and mobility and he appears to be appropriate for admission to [facility] at this time        ** Please Note: Flu[employer] voice to text software may have been used in the creation of this document   **

## 2022-03-21 NOTE — PROGRESS NOTES
Internal Medicine Progress Note  Patient: Narinder Marcus  Age/sex: 71 y o  male  Medical Record #: [id number]      ASSESSMENT/PLAN: (Interval History)  Narinder Marcus is seen and examined and management for following issues:    S/p anterior cervical discectomy and fixation fusion C5-6 and C6-7, posterior cervical decompression and instrumented fusion C3-T1  · Aspen collar at all times  · Decadron completed  · Pain control and therapy per PMR  · Pt found to have bleeding from the proximal portion of the posterior incision with bruising  Heparin drip stopped  NS saw pt and recommended frequent dressing changes  PAF  · INR 2 87  · Pt takes Coumadin 5mg Wed and Sat and 2 5mg Mon, Tues, Thurs, Fri and Sun  · INR in AM      HTN  · Has been elevated during hospitalization possible due to steroids  · Continue amlodipine 5mg 2x daily, hydralazine 50mg every 8 hours and Toprol XL 100mg daily  · If BP improves once steroids are completed will decrease hydralazine first    · Monitor BP every shift  Leiden Factor V  · DC Heparin drip when INR > 2   · Coumadin as above  Hyponatremia  · Na stable at 132  · Will continue to monitor  Leukocytosis  · Mild  Likely reactive  No signs of infx  The above assessment and plan was reviewed and updated as determined by my evaluation of the patient on 3/21/2022      Labs:   Results from last 7 days   Lab Units 03/21/22  0614 03/19/22  0529   WBC Thousand/uL 9 22 11 86*   HEMOGLOBIN g/dL 12 1 12 3   HEMATOCRIT % 33 7* 37 3   PLATELETS Thousands/uL 245 222     Results from last 7 days   Lab Units 03/21/22  0614 03/18/22  1148   SODIUM mmol/L 132* 130*   POTASSIUM mmol/L 3 7 3 7   CHLORIDE mmol/L 97* 100   CO2 mmol/L 27 24   BUN mg/dL 13 16   CREATININE mg/dL 0 60 0 60   CALCIUM mg/dL 9 3 9 2         Results from last 7 days   Lab Units 03/21/22  0613 03/20/22  0937   INR  2 87* 2 31*     Results from last 7 days   Lab Units 03/21/22  0926 03/21/22  0625 03/20/22  2110 POC GLUCOSE mg/dl 171* 108 120       Review of Scheduled Meds:  Current Facility-Administered Medications   Medication Dose Route Frequency Provider Last Rate    acetaminophen  975 mg Oral [facility] Freddie Ramierz MD      amLODIPine  5 mg Oral BID Freddie Ramirez MD      atorvastatin  40 mg Oral QPM Freddie Ramirez MD      bisacodyl  10 mg Rectal Daily PRN Freddie Ramirez MD      calcium carbonate  1,000 mg Oral Daily PRN Freddie Ramirez MD      cyclobenzaprine  5 mg Oral TID PRN Freddie Ramirez MD      Diclofenac Sodium  2 g Topical 4x Daily PRN Freddie Ramirez MD      docusate sodium  100 mg Oral BID Freddie Ramirez MD      DULoxetine  60 mg Oral HS Freddie Ramirez MD      flecainide  100 mg Oral BID Freddie Ramirez MD      hydrALAZINE  50 mg Oral [facility] Freddie Ramirez MD      lidocaine   Topical 4x Daily PRN Freddie Ramirez MD      methocarbamol  500 mg Oral Q6H [address] Freddie Ramirez MD      metoprolol succinate  100 mg Oral Daily Freddie Ramirez MD      oxyCODONE  2 5 mg Oral Q4H PRN Freddie Ramirez MD      oxyCODONE  5 mg Oral Q4H PRN Freddie Ramirez MD      pantoprazole  40 mg Oral Early Morning Freddie Ramirez MD      polyethylene glycol  17 g Oral Daily PRN Freddie Ramirez MD      senna  1 tablet Oral Daily Freddie Ramirez MD      warfarin  2 5 mg Oral Once per day on Sun Mon Tue Thu Fri Henna Hsieh PA-C      warfarin  5 mg Oral Once per day on Wed Sat Henna Hsieh PA-C         Subjective/ HPI: Patient seen and examined  Patients overnight issues or events were reviewed with nursing or staff during rounds or morning huddle session  New or overnight issues include the following:     Pt seen in his room  He states that he is doing well in therapy  Pain is well controlled  He denies any current complaints  ROS:   A 10 point ROS was performed; negative except as noted above         Imaging:     No orders to display       *Labs /Radiology studies Reviewed  *Medications  reviewed and reconciled as needed  *Please refer to order section for additional ordered labs studies  *Case discussed with primary attending during morning huddle case rounds    Physical Examination:  Vitals:   Vitals:    03/20/22 1515 03/20/22 2226 03/21/22 0550 03/21/22 0600   BP: 138/66 166/81 159/78    BP Location: Right arm Right arm Left arm    Pulse: 57 66 62    Resp: 18 18 16    Temp: 97 9 °F (36 6 °C) 97 7 °F (36 5 °C) 97 8 °F (36 6 °C)    TempSrc: Oral Oral Oral    SpO2: 98% 97% 97%    Weight:    105 kg (231 lb 14 8 oz)   Height:         General Appearance: no distress, conversive  HEENT: PERRLA, conjuctiva normal; oropharynx clear; mucous membranes moist;   Neck:  Aspen collar intact  Dressing intact  Picture of incision in media section  Lungs: CTA, normal respiratory effort, no retractions, expiratory effort normal  CV: regular rate and rhythm , PMI normal   ABD: soft non tender, no masses , no hepatic or splenomegaly  EXT: DP pulses intact, no lymphadenopathy, no edema  Skin: normal turgor, normal texture, no rash  Psych: affect normal, mood normal  Neuro: AAOx3       The above physical exam was reviewed and updated as determined by my evaluation of the patient on 3/21/2022  Invasive Devices  Report    Peripheral Intravenous Line            Peripheral IV 03/17/22 Dorsal (posterior); Right Forearm 4 days                   VTE Pharmacologic Prophylaxis: Warfarin (Coumadin)  Code Status: Level 3 - DNAR and DNI  Current Length of Stay: 3 day(s)      Total time spent:  30 minutes with more than 50% spent counseling/coordinating care  Counseling includes discussion with patient re: progress  and discussion with patient of his/her current medical state/information  Coordination of patient's care was performed in conjunction with primary service  Time invested included review of patient's labs, vitals, and management of their comorbidities with continued monitoring   In addition, this patient was discussed with medical team including physician and advanced extenders  The care of the patient was extensively discussed and appropriate treatment plan was formulated unique for this patient  ** Please Note:  voice to text software may have been used in the creation of this document   Although proof errors in transcription or interpretation are a potential of such software**

## 2022-03-21 NOTE — PROGRESS NOTES
03/21/22 0900   Pain Assessment   Pain Assessment Tool 0-10   Pain Score 3   Pain Location/Orientation Location: Neck   Pain Onset/Description Descriptor: Burning; Descriptor: Discomfort   Restrictions/Precautions   Precautions Fall Risk;Pain;Spinal precautions   Braces or Orthoses C/S Collar   Cognition   Overall Cognitive Status WFL   Subjective   Subjective "i am feeling good everyday"   Sit to Stand   Type of Assistance Needed Incidental touching;Supervision   Comment no AD   Sit to Stand CARE Score 4   Bed-Chair Transfer   Type of Assistance Needed Physical assistance   Physical Assistance Level 25% or less   Comment no AD   Chair/Bed-to-Chair Transfer CARE Score 3   Transfer Bed/Chair/Wheelchair   Findings no AD during session to imrpove balance and righting reactions   Car Transfer   Type of Assistance Needed Verbal cues; Incidental touching   Comment cueing for sequencing and safety, no AD used   Car Transfer CARE Score 4   Walk 10 Feet   Type of Assistance Needed Physical assistance   Physical Assistance Level 51%-75%   Comment no AD, HHA on R and gait belt   Walk 10 Feet CARE Score 2   Walk 50 Feet with Two Turns   Type of Assistance Needed Physical assistance   Physical Assistance Level 51%-75%   Comment no AD, HHA on R and gait belt   Walk 50 Feet with Two Turns CARE Score 2   Walk 150 Feet   Type of Assistance Needed Physical assistance   Physical Assistance Level 51%-75%   Comment no AD, HHA on R and gait belt   Walk 150 Feet CARE Score 2   Ambulation   Distance Walked (feet) 250 ft  (x1, 150'x3)   Assist Device Hand Hold   Gait Pattern Inconsistant Danae;Decreased foot clearance;Narrow AZRA;Scissoring; Improper weight shift;Trendelenburg   Limitations Noted In Balance; Coordination;Sensation; Sequencing;Speed;Strength   Provided Assistance with: Balance   Findings pt with notable trendelenberg on the R and NBOS of L crossing over midline    difficulty increasing gait speed   Wheel 50 Feet with Two Turns Reason if not Attempted Activity not applicable   Wheel 50 Feet with Two Turns CARE Score 9   Wheel 150 Feet   Reason if not Attempted Activity not applicable   Wheel 353 Feet CARE Score 9   Curb or Single Stair   Style negotiated Single stair   Type of Assistance Needed Adaptive equipment;Physical assistance   Physical Assistance Level 25% or less   Comment on  steps, B HRs, step through pattern   1 Step (Curb) CARE Score 3   4 Steps   Type of Assistance Needed Physical assistance; Adaptive equipment   Physical Assistance Level 25% or less   Comment on  steps, B HRs, step through pattern   4 Steps CARE Score 3   12 Steps   Type of Assistance Needed Physical assistance; Adaptive equipment   Physical Assistance Level 25% or less   Comment on  steps, B HRs, step through pattern   12 Steps CARE Score 3   Stairs   Findings performed on  steps again today; will trial FF tomorrow if able   Therapeutic Interventions   Balance STS edge of mat holding large yellow ball 2x10   static standing with ball toss normal stance and NBOS and on red balance disc with ball toss  Neuromuscular Re-Education sidestepping with 3# wt along rail 20'x4  stepping over dowels and bolsters 20'x4   Assessment   Treatment Assessment pt tolerated tx well with focus on gait training and balance training through static and dynamic balance activities  pt presents with weakness in trunk and core and proximal hip weakness limiting stability and safety with amb   working with no AD at this time to improve balance and righting reactions  will attempt FF of stairs tomorrow as well as cont focus on dynamc balance activities and core nad hip strenghtening to maximize functional mobility and ind for safe d/c home with LRAD   Problem List Decreased strength;Decreased range of motion;Decreased endurance; Impaired balance;Decreased mobility; Decreased coordination;Decreased skin integrity;Orthopedic restrictions;Pain   Barriers to Discharge Inaccessible home environment;Decreased caregiver support   PT Barriers   Functional Limitation Car transfers;Stair negotiation;Standing;Transfers; Walking   Plan   Progress Progressing toward goals   Recommendation   PT Discharge Recommendation Home with outpatient rehabilitation   Equipment Recommended   (LRAD)   PT Therapy Minutes   PT Time In 0900   PT Time Out 1030   PT Total Time (minutes) 90   PT Mode of treatment - Individual (minutes) 90   PT Mode of treatment - Concurrent (minutes) 0   PT Mode of treatment - Group (minutes) 0   PT Mode of treatment - Co-treat (minutes) 0   PT Mode of Treatment - Total time(minutes) 90 minutes   PT Cumulative Minutes 240   Therapy Time missed   Time missed?  No

## 2022-03-21 NOTE — PCC OCCUPATIONAL THERAPY
Pt progressed well and has completed FT with wife  No additional needs  All barriers have been resolved

## 2022-03-21 NOTE — CASE MANAGEMENT
Cm met with pt and reviewed rehab routine and cm role  Pt lives with his wife in [place] in a home with 4 MICHELLE  His wife can provide 24/7 supervision and transport but has her own health issues  He has 4 sons who can also help one being very local  He has a walker, a cane, and a BSC  He has has outpt therapy at [facility]  He has no hx or home therapy or STR in SNF  He uses [facility] through mail or [facility] on [address] in [place]  Reviewed team meeting process and potential LOS  Following to assist w/ d/c planning needs

## 2022-03-21 NOTE — PROGRESS NOTES
03/21/22 1240   Pain Assessment   Pain Assessment Tool 0-10   Pain Score 5   Pain Location/Orientation Location: Neck   Restrictions/Precautions   Precautions Fall Risk;Pain;Spinal precautions  (c-spine precuations)   Braces or Orthoses C/S Collar   Shower/Bathe Self   Type of Assistance Needed Physical assistance   Physical Assistance Level 25% or less   Comment Completed BUE and chest/torso bathing while seated at sink in w/c; A to bathe back; CGA while standing to bathe buttocks and daniel   Shower/Bathe Self CARE Score 3   Upper Body Dressing   Type of Assistance Needed Physical assistance   Physical Assistance Level 76% or more   Comment A to pull overhead; completed seated in w/c   Total A for collar management   Upper Body Dressing CARE Score 3   Lower Body Dressing   Type of Assistance Needed Incidental touching   Physical Assistance Level No physical assistance   Comment CGA in stance for CM over hips   Lower Body Dressing CARE Score 4   Putting On/Taking Off Footwear   Type of Assistance Needed Supervision   Physical Assistance Level No physical assistance   Comment Don/doff socks; doff shoes with cross leg technique   Putting On/Taking Off Footwear CARE Score 4   Sit to Lying   Type of Assistance Needed Supervision   Physical Assistance Level No physical assistance   Sit to Lying CARE Score 4   Lying to Sitting on Side of Bed   Type of Assistance Needed Supervision   Physical Assistance Level No physical assistance   Lying to Sitting on Side of Bed CARE Score 4   Sit to Stand   Type of Assistance Needed Incidental touching   Physical Assistance Level No physical assistance   Comment CGA with RW   Sit to Stand CARE Score 4   Bed-Chair Transfer   Type of Assistance Needed Incidental touching   Physical Assistance Level No physical assistance   Comment CGA with RW   Chair/Bed-to-Chair Transfer CARE Score 4   Toileting Hygiene   Type of Assistance Needed Incidental touching   Physical Assistance Level No physical assistance   Comment urinated in stance with slight management of CM down hips followed by CM up in stance at [facility] Score 4   Toilet Transfer   Comment Trialed urinating in stance at toilet; discussed use of urinal in bathroom to assist with urine output   Cognition   Overall Cognitive Status [facility]   Arousal/Participation Alert; Cooperative   Attention Attends with cues to redirect   Orientation Level Oriented X4   Memory Within functional limits   Following Commands Follows multistep commands with increased time or repetition   Activity Tolerance   Activity Tolerance Patient tolerated treatment well   Assessment   Treatment Assessment Pt participated in skilled OT session focusing on ADL tasks and functional transfers  Pt tolerated treatment well and reported some pain in neck, however, pt was not limited during treatment session  Pt required A with bathing back, managing c/s collar, and pulling shirt over his head  Pt used crossed-legged technique to wash feet and don/doff  socks and pants  Pt demonstrated good carryover with spinal precautions  Pt would benefit from continued OT services focusing on ADL retraining, functional transfer training, compensatory technique education, equipment education, and endurance training  Prognosis Good   Problem List Decreased strength;Decreased range of motion;Decreased endurance; Impaired balance;Decreased mobility; Decreased coordination;Decreased skin integrity;Orthopedic restrictions;Pain   Barriers to Discharge Inaccessible home environment;Decreased caregiver support   Plan   Treatment/Interventions ADL retraining;Functional transfer training; Therapeutic exercise; Endurance training;Patient/family training;Equipment eval/education; Compensatory technique education   Progress Progressing toward goals   Recommendation   OT Discharge Recommendation   (pending pt progress)   OT Therapy Minutes   OT Time In 1240   OT Time Out 1410   OT Total Time (minutes) 90   OT Mode of treatment - Individual (minutes) 90   OT Mode of treatment - Concurrent (minutes) 0   OT Mode of treatment - Group (minutes) 0   OT Mode of treatment - Co-treat (minutes) 0   OT Mode of Treatment - Total time(minutes) 90 minutes   OT Cumulative Minutes 240

## 2022-03-21 NOTE — PCC PHYSICAL THERAPY
Pt's d/c goals have been resolved and OK to d/c home tomorrow    Pt with deficits in impaired balance, strength and coordination, recommending use of AD at this time nad continued out pt therapy services to improve deficits to progress with functional ind

## 2022-03-21 NOTE — PLAN OF CARE
Reviewed    Problem: PAIN - ADULT  Goal: Verbalizes/displays adequate comfort level or baseline comfort level  Description: Interventions:  - Encourage patient to monitor pain and request assistance  - Assess pain using appropriate pain scale  - Administer analgesics based on type and severity of pain and evaluate response  - Implement non-pharmacological measures as appropriate and evaluate response  - Consider cultural and social influences on pain and pain management  - Notify physician/advanced practitioner if interventions unsuccessful or patient reports new pain  Outcome: Progressing     Problem: INFECTION - ADULT  Goal: Absence or prevention of progression during hospitalization  Description: INTERVENTIONS:  - Assess and monitor for signs and symptoms of infection  - Monitor lab/diagnostic results  - Monitor all insertion sites, i e  indwelling lines, tubes, and drains  - Monitor endotracheal if appropriate and nasal secretions for changes in amount and color  - [place] appropriate cooling/warming therapies per order  - Administer medications as ordered  - Instruct and encourage patient and family to use good hand hygiene technique  - Identify and instruct in appropriate isolation precautions for identified infection/condition  Outcome: Progressing  Goal: Absence of fever/infection during neutropenic period  Description: INTERVENTIONS:  - Monitor WBC    Outcome: Progressing     Problem: SAFETY ADULT  Goal: Patient will remain free of falls  Description: INTERVENTIONS:  - Educate patient/family on patient safety including physical limitations  - Instruct patient to call for assistance with activity   - Consult OT/PT to assist with strengthening/mobility   - Keep Call bell within reach  - Keep bed low and locked with side rails adjusted as appropriate  - Keep care items and personal belongings within reach  - Initiate and maintain comfort rounds  - Make Fall Risk Sign visible to staff  - Offer Toileting every 4 Hours, in advance of need  - Apply yellow socks and bracelet for high fall risk patients  - Consider moving patient to room near nurses station  Outcome: Progressing  Goal: Maintain or return to baseline ADL function  Description: INTERVENTIONS:  -  Assess patient's ability to carry out ADLs; assess patient's baseline for ADL function and identify physical deficits which impact ability to perform ADLs (bathing, care of mouth/teeth, toileting, grooming, dressing, etc )  - Assess/evaluate cause of self-care deficits   - Assess range of motion  - Assess patient's mobility; develop plan if impaired  - Assess patient's need for assistive devices and provide as appropriate  - Encourage maximum independence but intervene and supervise when necessary  - Involve family in performance of ADLs  - Assess for home care needs following discharge   - Consider OT consult to assist with ADL evaluation and planning for discharge  - Provide patient education as appropriate  Outcome: Progressing  Goal: Maintains/Returns to pre admission functional level  Description: INTERVENTIONS:  - Set and communicate daily mobility goal to care team and patient/family/caregiver     - Collaborate with rehabilitation services on mobility goals if consulted  - Out of bed for toileting  - Record patient progress and toleration of activity level   Outcome: Progressing     Problem: DISCHARGE PLANNING  Goal: Discharge to home or other facility with appropriate resources  Description: INTERVENTIONS:  - Identify barriers to discharge w/patient and caregiver  - Arrange for needed discharge resources and transportation as appropriate  - Identify discharge learning needs (meds, wound care, etc )  - Arrange for interpretive services to assist at discharge as needed  - Refer to Case Management Department for coordinating discharge planning if the patient needs post-hospital services based on physician/advanced practitioner order or complex needs related to functional status, cognitive ability, or social support system  Outcome: Progressing     Problem: Knowledge Deficit  Goal: Patient/family/caregiver demonstrates understanding of disease process, treatment plan, medications, and discharge instructions  Description: Complete learning assessment and assess knowledge base    Interventions:  - Provide teaching at level of understanding  - Provide teaching via preferred learning methods  Outcome: Progressing

## 2022-03-22 PROBLEM — R51.9 HEAD PAIN CEPHALGIA: Status: ACTIVE | Noted: 2022-03-22

## 2022-03-22 LAB
INR PPP: 2.68 (ref 0.84–1.19)
PROTHROMBIN TIME: 27.2 SECONDS (ref 11.6–14.5)

## 2022-03-22 PROCEDURE — 99232 SBSQ HOSP IP/OBS MODERATE 35: CPT | Performed by: INTERNAL MEDICINE

## 2022-03-22 PROCEDURE — 85610 PROTHROMBIN TIME: CPT | Performed by: PHYSICIAN ASSISTANT

## 2022-03-22 PROCEDURE — 99233 SBSQ HOSP IP/OBS HIGH 50: CPT

## 2022-03-22 PROCEDURE — 97112 NEUROMUSCULAR REEDUCATION: CPT

## 2022-03-22 PROCEDURE — 97535 SELF CARE MNGMENT TRAINING: CPT

## 2022-03-22 PROCEDURE — 97110 THERAPEUTIC EXERCISES: CPT

## 2022-03-22 PROCEDURE — 97116 GAIT TRAINING THERAPY: CPT

## 2022-03-22 RX ORDER — METHOCARBAMOL 500 MG/1
500 TABLET, FILM COATED ORAL EVERY 6 HOURS PRN
Status: DISCONTINUED | OUTPATIENT
Start: 2022-03-22 | End: 2022-03-29 | Stop reason: HOSPADM

## 2022-03-22 RX ORDER — HYDRALAZINE HYDROCHLORIDE 50 MG/1
50 TABLET, FILM COATED ORAL EVERY 8 HOURS SCHEDULED
Status: DISCONTINUED | OUTPATIENT
Start: 2022-03-22 | End: 2022-03-28

## 2022-03-22 RX ORDER — GABAPENTIN 100 MG/1
100 CAPSULE ORAL
Status: DISCONTINUED | OUTPATIENT
Start: 2022-03-22 | End: 2022-03-25

## 2022-03-22 RX ADMIN — METHOCARBAMOL 500 MG: 500 TABLET ORAL at 05:15

## 2022-03-22 RX ADMIN — STANDARDIZED SENNA CONCENTRATE 8.6 MG: 8.6 TABLET ORAL at 08:01

## 2022-03-22 RX ADMIN — ACETAMINOPHEN 975 MG: 325 TABLET ORAL at 14:24

## 2022-03-22 RX ADMIN — FLECAINIDE ACETATE 100 MG: 100 TABLET ORAL at 17:05

## 2022-03-22 RX ADMIN — METHOCARBAMOL 500 MG: 500 TABLET ORAL at 12:20

## 2022-03-22 RX ADMIN — AMLODIPINE BESYLATE 5 MG: 5 TABLET ORAL at 08:01

## 2022-03-22 RX ADMIN — DOCUSATE SODIUM 100 MG: 100 CAPSULE, LIQUID FILLED ORAL at 08:01

## 2022-03-22 RX ADMIN — PANTOPRAZOLE SODIUM 40 MG: 40 TABLET, DELAYED RELEASE ORAL at 05:15

## 2022-03-22 RX ADMIN — DULOXETINE HYDROCHLORIDE 60 MG: 60 CAPSULE, DELAYED RELEASE ORAL at 21:08

## 2022-03-22 RX ADMIN — TAMSULOSIN HYDROCHLORIDE 0.4 MG: 0.4 CAPSULE ORAL at 17:04

## 2022-03-22 RX ADMIN — FLECAINIDE ACETATE 100 MG: 100 TABLET ORAL at 08:01

## 2022-03-22 RX ADMIN — METOPROLOL SUCCINATE 100 MG: 100 TABLET, EXTENDED RELEASE ORAL at 08:01

## 2022-03-22 RX ADMIN — WARFARIN SODIUM 2.5 MG: 5 TABLET ORAL at 17:04

## 2022-03-22 RX ADMIN — ACETAMINOPHEN 975 MG: 325 TABLET ORAL at 21:08

## 2022-03-22 RX ADMIN — HYDRALAZINE HYDROCHLORIDE 50 MG: 50 TABLET, FILM COATED ORAL at 14:24

## 2022-03-22 RX ADMIN — HYDRALAZINE HYDROCHLORIDE 50 MG: 50 TABLET, FILM COATED ORAL at 05:15

## 2022-03-22 RX ADMIN — ACETAMINOPHEN 975 MG: 325 TABLET ORAL at 05:15

## 2022-03-22 RX ADMIN — HYDRALAZINE HYDROCHLORIDE 50 MG: 50 TABLET, FILM COATED ORAL at 21:08

## 2022-03-22 RX ADMIN — GABAPENTIN 100 MG: 100 CAPSULE ORAL at 21:08

## 2022-03-22 RX ADMIN — ATORVASTATIN CALCIUM 40 MG: 40 TABLET, FILM COATED ORAL at 17:03

## 2022-03-22 RX ADMIN — AMLODIPINE BESYLATE 5 MG: 5 TABLET ORAL at 21:08

## 2022-03-22 NOTE — PROGRESS NOTES
Physical Medicine and Rehabilitation Progress Note  Alvaro Rai 71 y o  male MRN: [id number]  Unit/Bed#: -01 Encounter: [id number]      Assessment & Plan:     Functional assessment:  Improving        Admit    Recent  Total assist UBD    Significant assist  UBD   Mod assist  To hygiene, bathing, LBD    Min assist   Bathing    Contact Guard  LBD, To hygiene    Supervision      Mod Indep/Indep     Transfers Mod assist  Min assist-supervision    Ambulation  10 ft significant assist  150 ft significant assist    Stairs  Total assist/NT      Goal: Mod indep with transfers and ambulation; Some mild assist for dressing, bathing  Major barriers:  Incoordination, imbalance, deconditioning   Dispo & ELOS: Home with ELOS 10 days from admission     * Cervical myelopathy [facility])  Assessment & Plan  Function improving well again  - Neuro exam stable 3/22    Cervical myelopathy S/P anterior cervical discectomy and fixation fusion C5/6 and C6/7; Posterior cervical decompression and instrumented fusion C3-T1 by Dr Jose Francisco Wells on 3/11/22  - Post-op Day 14 is Fri 3/25  - 3/22 - incision stable; ecchymosis evolving but no signs of infection or worsening hematoma > continue to monitor closely   > if worsening or looks infected notify NSx again for re-evaluation; neuro exam unchanged > continue to monitor closely  - Dressing changes daily and PRN   - Aspen cervical collar on at all times except can use [place] for showers  - Activity Restrictions: No heavy lifting greater than 5 - 10lbs  No strenuous activities  No driving while requiring cervical collar, anticipated six weeks  No significant neck movement  - May shower 3 days after surgery, but do not soak in a tub and no swimming, use mild antimicrobial soap and water   Pat incision dry after showering   - Monitor for neuro changes, dysphagia, neurogenic bowel/bladder, spasticity  - Continue acute comprehensive interdisciplinary inpatient rehabilitation to include intensive skilled therapies (PT, OT) as outlined with oversight and management by rehabilitation physician as well as inpatient rehab level nursing, case management and weekly interdisciplinary team meetings  - Optimal pain management  - Follow-up with Spine Sx and if needed PMR after d/c         Head pain cephalgia  Assessment & Plan  Reporting some burning type pain largely on his posterior scalp particularly when he lays down at night last several night  - Scalp unremarkable - no edema, TTP, warmth, drainage  - Cervical incision stable - see above  - Neuro exam stable   - Monitor closely - ?occipital neuralgia? other   - Gabapentin 100mg HS     Urinary dysfunction  Assessment & Plan  Hx of BPH on chronic flomax  - Home flomax resumed 3/21   - Monitor closely   - He reports urinary incontinence worsening prior to admission that has been improving since sx  - Notes some still impaired sensation   - voiding adequately with fairly low PVRS - hold scans   - Monitor for infection     Pain  Assessment & Plan  Well controlled but reports some posterior scalp neuropathic pain   - APAP TID  - PRN oxy 5-10mg Q4H PRN  - Robaxin 500mg change to Q6H PRN for spasms  - Gabapentin 100mg HS   - PRN LD cream for needlesticks         Hyponatremia  Assessment & Plan  Improving 3/21    Sinus bradycardia  Assessment & Plan  IM consulted and with overall management at their discretion during ARC course  Card OP follow-up     Paroxysmal A-fib ([facility] )  Assessment & Plan  IM consulted and with overall management at their discretion during ARC course  Rate control: MTP, flecainide  Antithrombotic:Warfarin     Depression, major, single episode, moderate (HCC)  Assessment & Plan  Cymbalta  Supportive counseling     Factor V Leiden mutation ([facility] )  Assessment & Plan  Warfarin per IM     WILL (obstructive sleep apnea)  Assessment & Plan  CPAP  - If patient unable to use CPAP - spot check O2 while sleeping and provide 2 NC with goal SpO2>91% - titrate as needed  - Consider noct ox    Essential hypertension  Assessment & Plan  Internal medicine consulted and management at their discretion  Monitor vitals with and without activity; monitor for orthostasis  Monitor hemoglobin, electrolytes, kidney function, hydration status   Current meds: Amlodipine, MTP      Dyslipidemia  Assessment & Plan  Statin       Other Medical Issues:   None    Disposition  · Home with ELOS 7-10 days from admission      Follow-up providers and other issues to be followed up after discharge  · PCP  · Neurosx  · Cards     CODE: Level 3: DNAR and DNI per discussion with pt/family     Restrictions include: Fall precautions    Objective:     Allergies per EMR  Diagnostic Studies: Reviewed, no new imaging  No orders to display     See above as well    Laboratory: Labs reviewed  Results from last 7 days   Lab Units 03/21/22  0614 03/19/22  0529 03/18/22  1148   HEMOGLOBIN g/dL 12 1 12 3 12 6   HEMATOCRIT % 33 7* 37 3 37 6   WBC Thousand/uL 9 22 11 86* 13 18*     Results from last 7 days   Lab Units 03/21/22  0614 03/18/22  1148 03/17/22  0539   BUN mg/dL 13 16 13   SODIUM mmol/L 132* 130* 134*   POTASSIUM mmol/L 3 7 3 7 3 9   CHLORIDE mmol/L 97* 100 99*   CREATININE mg/dL 0 60 0 60 0 70     Results from last 7 days   Lab Units 03/22/22  0603 03/21/22  0613 03/20/22  0937   PROTIME seconds 27 2* 28 6* 24 3*   INR  2 68* 2 87* 2 31*        Drug regimen reviewed, all potential adverse effects identified and addressed:    Scheduled Meds:  Current Facility-Administered Medications   Medication Dose Route Frequency Provider Last Rate    acetaminophen  975 mg Oral [facility] Chalino Brown MD      amLODIPine  5 mg Oral BID Chalino Brown MD      atorvastatin  40 mg Oral QPM Chalino Brown MD      bisacodyl  10 mg Rectal Daily PRN Chalino Brown MD      calcium carbonate  1,000 mg Oral Daily PRN Chalino Brown MD      Diclofenac Sodium  2 g Topical 4x Daily PRN Chalino Brown MD      docusate sodium 100 mg Oral BID Chalino Brown MD      DULoxetine  60 mg Oral HS Chalino Brown MD      flecainide  100 mg Oral BID Chalino Brown MD      gabapentin  100 mg Oral HS Chalino Brown MD      hydrALAZINE  50 mg Oral [facility] Beti Morgan DEEPTHI      lidocaine   Topical 4x Daily PRN Chalino Brown MD      methocarbamol  500 mg Oral Q6H PRN Chalino Brown MD      metoprolol succinate  100 mg Oral Daily Chalino Brown MD      oxyCODONE  2 5 mg Oral Q4H PRN Chalino Brown MD      oxyCODONE  5 mg Oral Q4H PRN Chalino Brown MD      pantoprazole  40 mg Oral Early Morning Chalino Brown MD      polyethylene glycol  17 g Oral Daily PRN Chalino Brown MD      senna  1 tablet Oral Daily Chalino Brown MD      tamsulosin  0 4 mg Oral Daily With Misty Negron MD      warfarin  2 5 mg Oral Once per day on Sun Mon Tue Thu Fri Henna Hsieh PA-C      warfarin  5 mg Oral Once per day on Wed Sat Henna Hsieh PA-C         Chief Complaints:  Rehab follow-up    Subjective:     Patient reports some burning type pain in bilateral top/back of head when he goes to lay down sometimes mostly  He denies other head pain, worsening strength, balance, radiating limb pain  He reports urinating better  He denies constipation, visual changes, increased neck pain or other new complaints  ROS: A 10 point ROS was performed; negative except as noted above         Physical Exam:  Temp:  [97 5 °F (36 4 °C)] 97 5 °F (36 4 °C)  HR:  [58-72] 72  Resp:  [17] 17  BP: (123-159)/(60-79) 143/71  SpO2:  [98 %] 98 %    GEN:  Sitting in NAD   HEENT/NECK: C collar; incision with trace serosangious drainage; some improving edema and ecchymosis without increased warmth, TTP; or pus; head/scalp without increased tenderness, edema or warmth   CARDIAC: Regular rate rhythm, no murmers, no rubs, no gallops  LUNGS:  clear to auscultation, no wheezes, rales, or rhonchi  ABDOMEN: Soft, non-tender, non-distended, normal active bowel sounds  EXTREMITIES/SKIN:  no calf edema, no calf tenderness to palpation  NEURO:   MENTAL STATUS:  Appropriate wakefulness and interaction  and Strength/MMT:  Unchanged  PSYCH:  Affect:  Euthymic     HPI:  Quincy White is a 71 y o  male with PMH of Afib, HTN, bradycardia, bilateral knee OA, depression, Factor V Leiden, on chronic A/C who developed progressive gait imbalance followed by more acute R sided weakness/sensory changes and imbalance found to have significant significant cervical spinal cord compression from degenerative spondylosis most consistent with cervical myelopathy  Patient underwent anterior cervical discectomy and fixation fusion C5/6 and C6/7; Posterior cervical decompression and instrumented fusion C3-T1 by Dr Edward Haddad on 3/11/22  Post-op course notable for significant decline in ADLs and mobility and he appears to be appropriate for admission to [facility] at this time        ** Please Note: Fluency Direct voice to text software may have been used in the creation of this document  **    Total time spent:  35 minutes, with more than 50% spent counseling/coordinating care  Counseling includes discussion with patient re: progress in therapies, functional issues observed by therapy staff, and discussion with patient his/her current medical state/wellbeing  Coordination of patient's care was performed in conjunction with Internal Medicine service to monitor patient's labs, vitals, and management of their comorbidities  In addition, this patient was discussed by the interdisciplinary team in weekly case conference today  The care of the patient was extensively discussed with all care providers and an appropriate rehabilitation plan was formulated unique for this patient  Barriers were identified preventing progression of therapy and appropriate interventions were discussed with each discipline   Please see the team note for input from all disciplines regarding barriers, intervention, and discharge planning

## 2022-03-22 NOTE — ASSESSMENT & PLAN NOTE
- Has been reporting some burning type pain largely on his posterior scalp/post auricular L>R -  particularly when he lays down - worse at night - neuralgia/neuritis +/- MSK  - Per NSx " Noticed a few burning twinges in left high para sagittal low suboccipital neck region;  Likely from stretching of parasagittal muscles andor medial fibers supraspinatus muscle"  - Scalp palpated again and unremarkable again prior to d/c - no edema, increased warmth, erythema, or TTP   - Cervical incision as outlined above   - Neuro exam stable > continue to monitor   - Gabapentin to 100mg qday and 300mg HS   - OP NSx follow-up

## 2022-03-22 NOTE — CASE MANAGEMENT
Cm called [facility] at [phone] for continued stay review to provide clinical update  Reviewer will send to medical director

## 2022-03-22 NOTE — TEAM CONFERENCE
Acute RehabilitationTeam Conference Note  Date: 3/22/2022   Time: 10:37 AM       Patient Name:  Ramiro Hanley       Medical Record Number: [id number]   YOB: 1950  Sex:  Male          Room/Bed:  [place] [address]/[facility] [place] 451-01  Payor Info:  Payor: JONO CASTILLO  REP / Plan: [employer]OCUS  REP / Product Type: Medicare HMO /      Admitting Diagnosis: Cervical spinal stenosis [M48 02]   Admit Date/Time:  3/18/2022  5:49 PM  Admission Comments: No comment available     Primary Diagnosis:  Cervical myelopathy ([facility] )  Principal Problem: Cervical myelopathy ([facility] )    Patient Active Problem List    Diagnosis Date Noted    Hyponatremia 03/21/2022    Urinary dysfunction 03/21/2022    Pain 03/18/2022    Muscle spasm 03/14/2022    Leukocytosis 03/13/2022    Goals of care, counseling/discussion 03/11/2022    Sinus bradycardia 03/10/2022    Cervical myelopathy ([facility] ) 03/05/2022    Ambulatory dysfunction 03/04/2022    Weakness 03/04/2022    Pes anserinus bursitis of right knee 12/14/2021    IFG (impaired fasting glucose) 06/14/2021    Memory difficulty 03/10/2021    History of DVT of lower extremity 01/19/2021    Mixed hyperlipidemia 10/27/2020    Paroxysmal A-fib ([facility] ) 09/08/2020    Lower extremity edema 09/08/2020    Primary osteoarthritis of left knee 06/05/2020    Primary osteoarthritis of right knee 06/05/2020    Depression, major, single episode, moderate ([facility] ) 10/30/2017    Insomnia 03/10/2017    Lumbar herniated disc 03/10/2017    Erectile dysfunction 02/09/2016    Status post catheter ablation of atrial flutter 01/29/2015    Dyslipidemia 01/09/2015    Essential hypertension 01/09/2015    WILL (obstructive sleep apnea) 01/09/2015    Factor V Leiden mutation ([facility] ) 01/03/2014       Physical Therapy:    Weight Bearing Status: Full Weight Bearing  Transfers: Incidental Touching  Bed Mobility: Minimal Assistance  Amulation Distance (ft): 150 feet  Ambulation: Moderate Assistance  Assistive Device for Ambulation: Hand Hold Assistance  Number of Stairs: 12  Assistive Device for Stairs: Bilateral Hand Rails  Stair Assistance: Minimal Assistance  Discharge Recommendations: Home with:  [address] with[de-identified] Outpatient Physical Therapy    Pt currently has barriers with (I) with walking, stairs, and balance/righting reactions  Pt with impaired balance and coordination, as well as trunk/core weakness  Pt with decrease sensation with LEs but improving  Pt lives with spouse, but will need to be mod I at time of d/c to perform functional mobility and ADLs  Pt's wife, can perform IADLs at time of d/c for pt  Pt a high fall risk at this time and below baseline  Pt will benefit from cont skilled therapy, to improve deficits and progress with barriers and functional ind with LRAD for time of d/c  Occupational Therapy:  Eating: Independent  Grooming: Incidental Touching  Bathing: Minimal Assistance  Bathing: Minimal Assistance  Upper Body Dressing: Maximum Assistance  Lower Body Dressing: Minimal Assistance  Toileting: Minimal Assistance  Toilet Transfer: Minimal Assistance  Cognition: Within Defined Limits  Orientation: Person,Place,Time,Situation  Discharge Recommendations: Home with:  77 [address] with[de-identified] Outpatient Occupational Therapy,Family Support       Occupational Therapy Weekly Team Note    Pt is demonstrating good progress with occupational therapy and is progressing toward long term goals for ADL, IADL, and functional transfers/mobility  Pts long term goals for ADLs are Independent A for C Collar management with Rolling Walker vs no RW  Pt continues to present with impairments in activity tolerance, endurance, standing balance/tolerance, UE strength, UE ROM, safety , and sensation   Occupational performance remains limited by fatigue, pain, spinal precautions, decreased caregiver support, risk for falls, and home environment  Family training/education will be required prior to D/C       Pt will continue to benefit from skilled acute rehab OT services to address above mentioned barriers and maximize functional independence in baseline areas of occupation to meet established treatment goals with overall decreased burden of care  Plan of care to continue to focus on ADL Retraining , LB Dressing,  LHAE education/training, IADL training , [employer], Meal preparation, Functional Transfers, Standing tolerance, Standing balance , DME training/education, Family training/education, Energy conservation training/education, and Leisure and social pursuits  Goals for the upcoming week are: progress to supervision/indep for ADL tasks  Anticipate Discharge date to be set  Speech Therapy:           No notes on file    Nursing Notes:  Appetite: Good  Diet Type: Diabetic                                                                     Pain Location/Orientation: Location: Neck  Pain Score: 3                       Hospital Pain Intervention(s): Medication (See MAR)          Kavita Magdaleno is a 71 y o  male with PMH of Afib, HTN, bradycardia, bilateral knee OA, depression, Factor V Leiden, on chronic A/C who developed progressive gait imbalance followed by more acute R sided weakness/sensory changes and imbalance found to have significant significant cervical spinal cord compression from degenerative spondylosis most consistent with cervical myelopathy  Patient underwent anterior cervical discectomy and fixation fusion C5/6 and C6/7; Posterior cervical decompression and instrumented fusion C3-T1 by Dr Soledad Siegel on 3/11/22  Post-op course notable for significant decline in ADLs and mobility and he appears to be appropriate for admission to [facility] at this time  This week we will encourage independence with ADLs while monitoring labs and vitals and maintaining skin integrity  We will keep the patient free from falls through patient education with safe transfers and maintaining safety precautions            Case Management:     Discharge Planning  Living Arrangements: Lives w/ Spouse/significant other  Support Systems: Spouse/significant other  Assistance Needed: na  Type of Current Residence: Private residence  Current Home Care Services: Yes  Pt is participating in therapy  Pt lives with his wife in [place] in a home with 4 MICHELLE  His wife can provide 24/7 supervision and transport but has her own health issues  He has 4 sons who can also help one being very local  He has a walker, a cane, and a BSC  He has has outpt therapy at [facility]  He has no hx or home therapy or STR in SNF  He uses [facility] through mail or [facility] on [address] in [place]  Following to assist w/ d/c planning needs  Is the patient actively participating in therapies? yes  List any modifications to the treatment plan:     Barriers Interventions   balance Therapy exercises   Coordination and sensation in LE Therapy exercises, therapeutic activities   MICHELLE Stairs training   Collar managment Family training   Incision Dressing change as needed     Is the patient making expected progress toward goals?  yes  List any update or changes to goals:     Medical Goals: Patient will be medically [facility] completion of [facility]hab program and Patient will be able to manage medical conditions and comorbid conditions with medications and follow up upon completion of rehab program    Weekly Team Goals:   Rehab Team Goals  ADL Team Goal: Patient will be independent with ADLs with least restrictive device upon completion of rehab program  Bowel/Bladder Team Goal: Patient will return to premorbid level for bladder/bowel management upon completion of rehab program  Transfer Team Goal: Patient will be independent with transfers with least restrictive device upon completion of rehab program  Locomotion Team Goal: Patient will be independent with locomotion with least restrictive device upon completion of rehab program    Discussion: Pt presents with the above barriers  Family training will need to occur for collar management and dressing changes  Therapy will call to initiate training for tomorrow  Pt is functioning at min a with walker but is mod a if he loses balance  He is min a for Adls  Recommendations will be made for outpt PT  Anticipated Discharge Date:  TBD  [facility] Team Members Present: The following team members are supervising care for this patient and were present during this Weekly Team Conference      Physician: Dr Cooper Rdz MD  : DHAVAL Francisco  Registered Nurse: Gabrielle Zuniga RN  Physical Therapist: Merna Will DPT  Occupational Therapist: Jessi Stevenson MS, OTR/L  Speech Therapist: Rodrigue Girard, [place], [address] [place] [address]

## 2022-03-22 NOTE — PLAN OF CARE
Problem: PAIN - ADULT  Goal: Verbalizes/displays adequate comfort level or baseline comfort level  Description: Interventions:  - Encourage patient to monitor pain and request assistance  - Assess pain using appropriate pain scale  - Administer analgesics based on type and severity of pain and evaluate response  - Implement non-pharmacological measures as appropriate and evaluate response  - Consider cultural and social influences on pain and pain management  - Notify physician/advanced practitioner if interventions unsuccessful or patient reports new pain  Outcome: Progressing     Problem: INFECTION - ADULT  Goal: Absence or prevention of progression during hospitalization  Description: INTERVENTIONS:  - Assess and monitor for signs and symptoms of infection  - Monitor lab/diagnostic results  - Monitor all insertion sites, i e  indwelling lines, tubes, and drains  - Monitor endotracheal if appropriate and nasal secretions for changes in amount and color  - Bruning appropriate cooling/warming therapies per order  - Administer medications as ordered  - Instruct and encourage patient and family to use good hand hygiene technique  - Identify and instruct in appropriate isolation precautions for identified infection/condition  Outcome: Progressing  Goal: Absence of fever/infection during neutropenic period  Description: INTERVENTIONS:  - Monitor WBC    Outcome: Progressing     Problem: SAFETY ADULT  Goal: Patient will remain free of falls  Description: INTERVENTIONS:  - Educate patient/family on patient safety including physical limitations  - Instruct patient to call for assistance with activity   - Consult OT/PT to assist with strengthening/mobility   - Keep Call bell within reach  - Keep bed low and locked with side rails adjusted as appropriate  - Keep care items and personal belongings within reach  - Initiate and maintain comfort rounds  - Make Fall Risk Sign visible to staff  - Offer Toileting every  Hours, in advance of need  - Initiate/Maintain alarm  - Obtain necessary fall risk management equipment:   - Apply yellow socks and bracelet for high fall risk patients  - Consider moving patient to room near nurses station  Outcome: Progressing  Goal: Maintain or return to baseline ADL function  Description: INTERVENTIONS:  -  Assess patient's ability to carry out ADLs; assess patient's baseline for ADL function and identify physical deficits which impact ability to perform ADLs (bathing, care of mouth/teeth, toileting, grooming, dressing, etc )  - Assess/evaluate cause of self-care deficits   - Assess range of motion  - Assess patient's mobility; develop plan if impaired  - Assess patient's need for assistive devices and provide as appropriate  - Encourage maximum independence but intervene and supervise when necessary  - Involve family in performance of ADLs  - Assess for home care needs following discharge   - Consider OT consult to assist with ADL evaluation and planning for discharge  - Provide patient education as appropriate  Outcome: Progressing  Goal: Maintains/Returns to pre admission functional level  Description: INTERVENTIONS:  - Perform BMAT or MOVE assessment daily    - Set and communicate daily mobility goal to care team and patient/family/caregiver  - Collaborate with rehabilitation services on mobility goals if consulted  - Perform Range of Motion  times a day  - Reposition patient every  hours    - Dangle patient  times a day  - Stand patient  times a day  - Ambulate patient  times a day  - Out of bed to chair  times a day   - Out of bed for meals  times a day  - Out of bed for toileting  - Record patient progress and toleration of activity level   Outcome: Progressing     Problem: DISCHARGE PLANNING  Goal: Discharge to home or other facility with appropriate resources  Description: INTERVENTIONS:  - Identify barriers to discharge w/patient and caregiver  - Arrange for needed discharge resources and transportation as appropriate  - Identify discharge learning needs (meds, wound care, etc )  - Arrange for interpretive services to assist at discharge as needed  - Refer to Case Management Department for coordinating discharge planning if the patient needs post-hospital services based on physician/advanced practitioner order or complex needs related to functional status, cognitive ability, or social support system  Outcome: Progressing     Problem: Knowledge Deficit  Goal: Patient/family/caregiver demonstrates understanding of disease process, treatment plan, medications, and discharge instructions  Description: Complete learning assessment and assess knowledge base    Interventions:  - Provide teaching at level of understanding  - Provide teaching via preferred learning methods  Outcome: Progressing     Problem: Potential for Falls  Goal: Patient will remain free of falls  Description: INTERVENTIONS:  - Educate patient/family on patient safety including physical limitations  - Instruct patient to call for assistance with activity   - Consult OT/PT to assist with strengthening/mobility   - Keep Call bell within reach  - Keep bed low and locked with side rails adjusted as appropriate  - Keep care items and personal belongings within reach  - Initiate and maintain comfort rounds  - Make Fall Risk Sign visible to staff  - Offer Toileting every  Hours, in advance of need  - Initiate/Maintain alarm  - Obtain necessary fall risk management equipment:  - Apply yellow socks and bracelet for high fall risk patients  - Consider moving patient to room near nurses station  Outcome: Progressing

## 2022-03-22 NOTE — PROGRESS NOTES
Internal Medicine Progress Note  Patient: Emilie Kearney  Age/sex: 71 y o  male  Medical Record #: [id number]      ASSESSMENT/PLAN: (Interval History)  Emilie Kearney is seen and examined and management for following issues:    S/p anterior cervical discectomy and fixation fusion C5-6 and C6-7, posterior cervical decompression and instrumented fusion C3-T1  · Aspen collar at all times  · S/p Decadron   · Pain control and therapy per PMR  · Pt found to have bleeding from the proximal portion of the posterior incision with bruising over the weekend  · Heparin drip stopped  · NS saw pt and recommended frequent dressing changes  PAF  · INR 2 68  · Pt takes Coumadin 5mg Wed and Sat and 2 5mg Mon, Tues, Thurs, Fri and Sun  · Repeat INR thursday     HTN  · Has been elevated during hospitalization possible due to steroids  · Continue amlodipine 5mg 2x daily, hydralazine 50mg every 8 hours and Toprol XL 100mg daily  · BP starting to improve likely d/t steroids being completed  · Will adjust hold parameters for hydralazine      Leiden Factor V  · Coumadin as above  Hyponatremia  · Na stable at 132  · Will continue to monitor  DC planning: TBD  The above assessment and plan was reviewed and updated as determined by my evaluation of the patient on 3/22/2022      Labs:   Results from last 7 days   Lab Units 03/21/22  0614 03/19/22  0529   WBC Thousand/uL 9 22 11 86*   HEMOGLOBIN g/dL 12 1 12 3   HEMATOCRIT % 33 7* 37 3   PLATELETS Thousands/uL 245 222     Results from last 7 days   Lab Units 03/21/22  0614 03/18/22  1148   SODIUM mmol/L 132* 130*   POTASSIUM mmol/L 3 7 3 7   CHLORIDE mmol/L 97* 100   CO2 mmol/L 27 24   BUN mg/dL 13 16   CREATININE mg/dL 0 60 0 60   CALCIUM mg/dL 9 3 9 2         Results from last 7 days   Lab Units 03/22/22  0603 03/21/22  0613   INR  2 68* 2 87*     Results from last 7 days   Lab Units 03/21/22  0926 03/21/22  0625 03/20/22  2110   POC GLUCOSE mg/dl 171* 108 120       Review of Scheduled Meds:  Current Facility-Administered Medications   Medication Dose Route Frequency Provider Last Rate    acetaminophen  975 mg Oral [facility] Dana Peters MD      amLODIPine  5 mg Oral BID Dana Peters MD      atorvastatin  40 mg Oral QPM Dana Peters MD      bisacodyl  10 mg Rectal Daily PRN Dana Peters MD      calcium carbonate  1,000 mg Oral Daily PRN Dana Peters MD      cyclobenzaprine  5 mg Oral TID PRN Dana Peters MD      Diclofenac Sodium  2 g Topical 4x Daily PRN Dana Peters MD      docusate sodium  100 mg Oral BID Dana Peters MD      DULoxetine  60 mg Oral HS Dana Peters MD      flecainide  100 mg Oral BID Dana Peters MD      hydrALAZINE  50 mg Oral [facility] Ree Cos, CRNP      lidocaine   Topical 4x Daily PRN Dana Peters MD      methocarbamol  500 mg Oral Q6H [facility] Dana Peters MD      metoprolol succinate  100 mg Oral Daily Dana Peters MD      oxyCODONE  2 5 mg Oral Q4H PRN Dana Peters MD      oxyCODONE  5 mg Oral Q4H PRN Dana Peters MD      pantoprazole  40 mg Oral Early Morning Dana Peters MD      polyethylene glycol  17 g Oral Daily PRN Dana Peters MD      senna  1 tablet Oral Daily Dana Peters MD      tamsulosin  0 4 mg Oral Daily With Caden Presley MD      warfarin  2 5 mg Oral Once per day on Sun Mon Tue Thu Fri Henna Hsieh PA-C      warfarin  5 mg Oral Once per day on Wed Sat Henna Hsieh PA-C         Subjective/ HPI: Patient seen and examined  Patients overnight issues or events were reviewed with nursing or staff during rounds or morning huddle session  New or overnight issues include the following:     Pt seen in his room  Came back from shower, dressing with small amount of drainage, no evidence of infection  No complaints other than some head burning at night time    ROS:   A 10 point ROS was performed; negative except as noted above         Imaging:     No orders to display       *Labs /Radiology studies Reviewed  *Medications  reviewed and reconciled as needed  *Please refer to order section for additional ordered labs studies  *Case discussed with primary attending during morning huddle case rounds    Physical Examination:  Vitals:   Vitals:    03/21/22 2126 03/21/22 2358 03/22/22 0516 03/22/22 0758   BP: 159/79 125/61 123/60 126/60   BP Location:    Left arm   Pulse:  58  72   Resp:  17     Temp:  97 5 °F (36 4 °C)     TempSrc:       SpO2:  98%     Weight:       Height:         GEN: No apparent distress, interactive  NEURO: Alert and oriented x3  HEENT: Pupils are equal and reactive, EOMI, mucous membranes are moist, face symmetrical; Aspen collar in place  CV: S1 S2 regular, no MRG, no peripheral edema noted  RESP: Lungs are clear bilaterally, no wheezes, rales or rhonchi noted, on room air, respirations easy and non labored  GI: Flat, soft non tender, non distended; +BS x4  : Voiding without difficulty  MUSC: Moves all extremities  SKIN: pink, warm and dry, normal turgor, dressing intact to incision site      The above physical exam was reviewed and updated as determined by my evaluation of the patient on 3/22/2022  Invasive Devices  Report    None                    VTE Pharmacologic Prophylaxis: Warfarin (Coumadin)  Code Status: Level 3 - DNAR and DNI  Current Length of Stay: 4 day(s)      Total time spent:  30 minutes with more than 50% spent counseling/coordinating care  Counseling includes discussion with patient re: progress  and discussion with patient of his/her current medical state/information  Coordination of patient's care was performed in conjunction with primary service  Time invested included review of patient's labs, vitals, and management of their comorbidities with continued monitoring  In addition, this patient was discussed with medical team including physician and advanced extenders   The care of the patient was extensively discussed and appropriate treatment plan was formulated unique for this patient  ** Please Note:  voice to text software may have been used in the creation of this document   Although proof errors in transcription or interpretation are a potential of such software**

## 2022-03-22 NOTE — PROGRESS NOTES
03/22/22 1230   Pain Assessment   Pain Assessment Tool 0-10   Pain Score 5   Pain Location/Orientation Location: Neck   Restrictions/Precautions   Precautions Fall Risk;Pain;Spinal precautions   Braces or Orthoses C/S Collar   Cognition   Overall Cognitive Status WFL   Subjective   Subjective pt reports more pain in neck this session with activity  felt lightheaded when walking  BP taken 141/81   Roll Left and Right   Type of Assistance Needed Supervision   Comment verbal cues for log rolling   Roll Left and Right CARE Score 4   Sit to Lying   Type of Assistance Needed Supervision   Sit to Lying CARE Score 4   Lying to Sitting on Side of Bed   Type of Assistance Needed Verbal cues; Supervision   Comment cues for hand placement on mat to sit up   Lying to Sitting on Side of Bed CARE Score 4   Sit to Stand   Type of Assistance Needed Incidental touching   Comment no AD   Sit to Stand CARE Score 4   Bed-Chair Transfer   Type of Assistance Needed Physical assistance   Physical Assistance Level 25% or less   Comment no AD   Chair/Bed-to-Chair Transfer CARE Score 3   Walk 10 Feet   Type of Assistance Needed Physical assistance   Physical Assistance Level 26%-50%   Comment no AD, use gait belt due to poor balance   Walk 10 Feet CARE Score 3   Walk 50 Feet with Two Turns   Type of Assistance Needed Physical assistance   Physical Assistance Level 51%-75%   Comment no AD, use gait belt due to poor balance   Walk 50 Feet with Two Turns CARE Score 2   Walk 150 Feet   Type of Assistance Needed Physical assistance   Physical Assistance Level 51%-75%   Comment no AD, use gait belt due to poor balance   Walk 150 Feet CARE Score 2   Walking 10 Feet on Uneven Surfaces   Reason if not Attempted Safety concerns   Walking 10 Feet on Uneven Surfaces CARE Score 88   Ambulation   Distance Walked (feet) 350 ft  (x1, 150'x1, 100'x1)   Assist Device Hand Hold  (with gait belt)   Gait Pattern Inconsistant Danae; Slow Danae;Decreased foot clearance; Lateral deviation;Narrow AZRA;Scissoring;Trendelenburg; Improper weight shift   Limitations Noted In Balance; Coordination; Sequencing;Speed;Strength;Sensation   Findings pt's heels hit together due to NBOS and LLE crossing midline  pt instructed to widen stance, difficulty feeling at times due to impaired sensation  pt will carryover "act like there is 3 feet betwen your feet" and is able to improve stance  turns are difficult with increase scissoring, core nad proximal hip weakness causing trendelenburg and increase trunk lateral flex with walking   Wheel 50 Feet with Two Turns   Reason if not Attempted Activity not applicable   Wheel 50 Feet with Two Turns CARE Score 9   Wheel 150 Feet   Reason if not Attempted Activity not applicable   Wheel 770 Feet CARE Score 9   Stairs   Findings trial on FF tomorrow   Therapeutic Interventions   Strengthening supine bridging x10, bridging x10 with hip abd ball squeeze,  alt knee to chest from hooklying position x10 R and L   alt knee to chest with SLR down to mat x10 each leg    Balance static standing normal stance with trunk rotation with 4# med ball   modified tandem stance R and L with trunk rotation x10 each direction   Other unsupported STS with 4# med ball, needs cues to improve ant wt shift sitting and standing, x15 total   Equipment Use   NuStep LEs only L1 10mins SPM <70   Assessment   Treatment Assessment pt focused on trunk and proximal hip strengthening today to improve coordination and balance wtih gait training  pt more unsteady today iwth amb with no HHA, demonstrating increase scissoring and poor righting reactions  pt educated t/o session of importance of exs and main focus on therapy as pt is well below baseline and has increase fall risk at this time  will cont to work on coordination, balance, and safety to improve stairs and gait training to decrease burden of care at time fo d/c      Problem List Decreased strength;Decreased range of motion;Decreased endurance; Impaired balance;Decreased mobility; Decreased coordination;Decreased skin integrity;Orthopedic restrictions;Pain   Barriers to Discharge Inaccessible home environment;Decreased caregiver support   PT Barriers   Functional Limitation Car transfers;Stair negotiation;Standing;Transfers; Walking   Plan   Progress Progressing toward goals   Recommendation   PT Discharge Recommendation Home with outpatient rehabilitation   Equipment Recommended Walker   PT Therapy Minutes   PT Time In 1230   PT Time Out 1400   PT Total Time (minutes) 90   PT Mode of treatment - Individual (minutes) 90   PT Mode of treatment - Concurrent (minutes) 0   PT Mode of treatment - Group (minutes) 0   PT Mode of treatment - Co-treat (minutes) 0   PT Mode of Treatment - Total time(minutes) 90 minutes   PT Cumulative Minutes 330   Therapy Time missed   Time missed?  No

## 2022-03-22 NOTE — PROGRESS NOTES
03/22/22 0830   Pain Assessment   Pain Assessment Tool 0-10   Pain Score 3   Pain Location/Orientation Location: Neck   Restrictions/Precautions   Precautions Fall Risk;Pain;Spinal precautions  (c-spine precuations)   Braces or Orthoses C/S Collar   Oral Hygiene   Type of Assistance Needed Incidental touching   Physical Assistance Level No physical assistance   Comment CGA in stance at sink   Oral Hygiene CARE Score 4   Shower/Bathe Self   Type of Assistance Needed Physical assistance   Physical Assistance Level 25% or less   Comment Min A in stance to wash buttock/daniel  pt able to complete rest of shower with S seated on shower chair and use of LH sponge to follow spinal precuations  Used Margarette collar and inscisions were covered  Shower/Bathe Self CARE Score 3   Upper Body Dressing   Type of Assistance Needed Physical assistance   Physical Assistance Level 76% or more   Comment Total A for collar management, slight A for shirt down back   Upper Body Dressing CARE Score 2   Lower Body Dressing   Type of Assistance Needed Incidental touching   Physical Assistance Level No physical assistance   Comment CGA in stance for CM over hips  Ardena Siemens with cross leg technique   Lower Body Dressing CARE Score 4   Putting On/Taking Off Footwear   Type of Assistance Needed Supervision   Physical Assistance Level No physical assistance   Comment delio  socks with cross leg technique   Putting On/Taking Off Footwear CARE Score 4   Sit to Stand   Type of Assistance Needed Incidental touching   Physical Assistance Level No physical assistance   Comment CGA no UE support   Sit to Stand CARE Score 4   Bed-Chair Transfer   Type of Assistance Needed Physical assistance   Physical Assistance Level 25% or less   Comment Min A with no AD, CGA with RW   Chair/Bed-to-Chair Transfer CARE Score 3   Cognition   Overall Cognitive Status WFL   Arousal/Participation Alert; Cooperative   Attention Attends with cues to redirect   Orientation Level Oriented X4   Memory Within functional limits   Following Commands Follows multistep commands with increased time or repetition   Activity Tolerance   Activity Tolerance Patient tolerated treatment well   Assessment   Treatment Assessment Pt engaged in skilled OT session with focus on completing ADl (shower), functional transfers, and standing tolerance/balance  Pt tolerated session well, completing most of ADL at min A level  Pt continues to require Max A for C-Collar management but does well maintaining neck position when changing from seirra to Rojas  Nursing present to assess new onset of anterior neck redness and complete dressing change  Pt continues to demo good ability to follow all spinal precautions throughout ADLs and functional transfers  Plan to contact [facility] today to schedule FT for upcoming session  Pt will benefit from continued skilled OT to maximize indep and safety with ADLs and functional transfers  Continue POC with focus on: ADL retraining, functional transfers, RW vs no RW, compensation techniques, education, UE strengthening/endurance training  Prognosis Good   Problem List Decreased strength;Decreased range of motion;Decreased endurance; Impaired balance;Decreased mobility; Decreased coordination;Decreased skin integrity;Orthopedic restrictions;Pain   Barriers to Discharge Inaccessible home environment;Decreased caregiver support   Plan   Treatment/Interventions ADL retraining;Functional transfer training; Therapeutic exercise; Endurance training;Patient/family training;Equipment eval/education; Compensatory technique education   Progress Progressing toward goals   Recommendation   OT Discharge Recommendation   (pending pt progress)   OT Therapy Minutes   OT Time In 0830   OT Time Out 1008   OT Total Time (minutes) 98   OT Mode of treatment - Individual (minutes) 98   OT Mode of treatment - Concurrent (minutes) 0   OT Mode of treatment - Group (minutes) 0   OT Mode of treatment - Co-treat (minutes) 0   OT Mode of Treatment - Total time(minutes) 98 minutes   OT Cumulative Minutes 338

## 2022-03-22 NOTE — PCC NURSING
Bg Thomas is a 71 y o  male with PMH of Afib, HTN, bradycardia, bilateral knee OA, depression, Factor V Leiden, on chronic A/C who developed progressive gait imbalance followed by more acute R sided weakness/sensory changes and imbalance found to have significant significant cervical spinal cord compression from degenerative spondylosis most consistent with cervical myelopathy  Patient underwent anterior cervical discectomy and fixation fusion C5/6 and C6/7; Posterior cervical decompression and instrumented fusion C3-T1 by Dr Balaij Washington on 3/11/22  Post-op course notable for significant decline in ADLs and mobility and he appears to be appropriate for admission to [facility] at this time  This week we will encourage independence with ADLs while monitoring labs and vitals and maintaining skin integrity  We will keep the patient free from falls through patient education with safe transfers and maintaining safety precautions  Expected discharge date 3/29/22

## 2022-03-22 NOTE — CASE MANAGEMENT
Cm met with pt and reviewed team meeting update  He is in agreement with d/c mid next week and is requesting cm to make outpt PT appointment at [facility]  Following to assist w/ d/c planning needs

## 2022-03-23 PROCEDURE — 97112 NEUROMUSCULAR REEDUCATION: CPT

## 2022-03-23 PROCEDURE — 97530 THERAPEUTIC ACTIVITIES: CPT

## 2022-03-23 PROCEDURE — 99232 SBSQ HOSP IP/OBS MODERATE 35: CPT | Performed by: INTERNAL MEDICINE

## 2022-03-23 PROCEDURE — 99232 SBSQ HOSP IP/OBS MODERATE 35: CPT

## 2022-03-23 PROCEDURE — 97116 GAIT TRAINING THERAPY: CPT

## 2022-03-23 RX ADMIN — METOPROLOL SUCCINATE 100 MG: 100 TABLET, EXTENDED RELEASE ORAL at 08:36

## 2022-03-23 RX ADMIN — GABAPENTIN 100 MG: 100 CAPSULE ORAL at 21:07

## 2022-03-23 RX ADMIN — STANDARDIZED SENNA CONCENTRATE 8.6 MG: 8.6 TABLET ORAL at 08:36

## 2022-03-23 RX ADMIN — ACETAMINOPHEN 975 MG: 325 TABLET ORAL at 21:07

## 2022-03-23 RX ADMIN — ATORVASTATIN CALCIUM 40 MG: 40 TABLET, FILM COATED ORAL at 17:54

## 2022-03-23 RX ADMIN — OXYCODONE HYDROCHLORIDE 2.5 MG: 5 TABLET ORAL at 17:58

## 2022-03-23 RX ADMIN — FLECAINIDE ACETATE 100 MG: 100 TABLET ORAL at 08:36

## 2022-03-23 RX ADMIN — AMLODIPINE BESYLATE 5 MG: 5 TABLET ORAL at 08:36

## 2022-03-23 RX ADMIN — DOCUSATE SODIUM 100 MG: 100 CAPSULE, LIQUID FILLED ORAL at 08:36

## 2022-03-23 RX ADMIN — DOCUSATE SODIUM 100 MG: 100 CAPSULE, LIQUID FILLED ORAL at 17:54

## 2022-03-23 RX ADMIN — DULOXETINE HYDROCHLORIDE 60 MG: 60 CAPSULE, DELAYED RELEASE ORAL at 21:06

## 2022-03-23 RX ADMIN — HYDRALAZINE HYDROCHLORIDE 50 MG: 50 TABLET, FILM COATED ORAL at 06:13

## 2022-03-23 RX ADMIN — FLECAINIDE ACETATE 100 MG: 100 TABLET ORAL at 17:54

## 2022-03-23 RX ADMIN — TAMSULOSIN HYDROCHLORIDE 0.4 MG: 0.4 CAPSULE ORAL at 16:29

## 2022-03-23 RX ADMIN — ACETAMINOPHEN 975 MG: 325 TABLET ORAL at 15:12

## 2022-03-23 RX ADMIN — AMLODIPINE BESYLATE 5 MG: 5 TABLET ORAL at 21:07

## 2022-03-23 RX ADMIN — DICLOFENAC SODIUM 2 G: 10 GEL TOPICAL at 15:12

## 2022-03-23 RX ADMIN — WARFARIN SODIUM 5 MG: 5 TABLET ORAL at 17:54

## 2022-03-23 RX ADMIN — POLYETHYLENE GLYCOL 3350 17 G: 17 POWDER, FOR SOLUTION ORAL at 10:51

## 2022-03-23 RX ADMIN — ACETAMINOPHEN 975 MG: 325 TABLET ORAL at 06:11

## 2022-03-23 RX ADMIN — HYDRALAZINE HYDROCHLORIDE 50 MG: 50 TABLET, FILM COATED ORAL at 15:12

## 2022-03-23 RX ADMIN — PANTOPRAZOLE SODIUM 40 MG: 40 TABLET, DELAYED RELEASE ORAL at 06:11

## 2022-03-23 NOTE — PROGRESS NOTES
03/23/22 1100   Pain Assessment   Pain Assessment Tool 0-10   Pain Score No Pain   Restrictions/Precautions   Precautions Fall Risk;Spinal precautions   Braces or Orthoses C/S Collar   Cognition   Overall Cognitive Status WFL   Arousal/Participation Alert; Cooperative   Subjective   Subjective pt denied any pain and was agreeable to have PT  Roll Left and Right   Type of Assistance Needed Set-up / clean-up   Roll Left and Right CARE Score 5   Sit to Lying   Type of Assistance Needed Supervision   Comment HOB flat without rail    Sit to Lying CARE Score 4   Lying to Sitting on Side of Bed   Type of Assistance Needed Supervision   Comment HOB flat without rail    Lying to Sitting on Side of Bed CARE Score 4   Sit to Stand   Type of Assistance Needed Incidental touching;Verbal cues   Comment CG-CS with hands on laps, VC for ant weight shift to facilitate ease of standing up    Sit to Stand CARE Score 4   Bed-Chair Transfer   Type of Assistance Needed Verbal cues; Adaptive equipment;Physical assistance   Physical Assistance Level 25% or less   Comment min-CG without AD, CG with SPC, CS with RW    Chair/Bed-to-Chair Transfer CARE Score 3   Walk 10 Feet   Type of Assistance Needed Physical assistance;Verbal cues; Adaptive equipment   Physical Assistance Level 25% or less   Comment min A without AD, CG-min A with SPC, CS with RW    Walk 10 Feet CARE Score 3   Walk 50 Feet with Two Turns   Type of Assistance Needed Physical assistance;Verbal cues; Adaptive equipment   Physical Assistance Level 25% or less   Walk 50 Feet with Two Turns CARE Score 3   Walk 150 Feet   Type of Assistance Needed Physical assistance;Verbal cues; Adaptive equipment   Physical Assistance Level 25% or less   Walk 150 Feet CARE Score 3   Assessment   Treatment Assessment Skilled PT worked on functional mobilities with and without AD  At this time unsafe to mobilize without using an AD   Demo good coordination using SPC but still demos scissoring gait at times but no overt LOB  Pt is most steady when mobilizing with RW  At this time PT will focus on mobility training without AD for neuro re-ed or use SPC for light support  Barriers to Discharge Inaccessible home environment;Decreased caregiver support   Plan   Treatment/Interventions Functional transfer training;LE strengthening/ROM; Therapeutic exercise; Endurance training;Bed mobility;Gait training;Spoke to nursing;OT   Progress Progressing toward goals   Recommendation   PT Discharge Recommendation Home with outpatient rehabilitation   PT - OK to Discharge No   PT Therapy Minutes   PT Time In 1100   PT Time Out 1130   PT Total Time (minutes) 30   PT Mode of treatment - Individual (minutes) 30   PT Mode of treatment - Concurrent (minutes) 0   PT Mode of treatment - Group (minutes) 0   PT Mode of treatment - Co-treat (minutes) 0   PT Mode of Treatment - Total time(minutes) 30 minutes   PT Cumulative Minutes 360   Therapy Time missed   Time missed?  No

## 2022-03-23 NOTE — PROGRESS NOTES
03/23/22 0830   Pain Assessment   Pain Assessment Tool 0-10   Pain Score No Pain   Restrictions/Precautions   Precautions Fall Risk;Spinal precautions   Braces or Orthoses C/S Collar   Sit to Stand   Type of Assistance Needed Incidental touching   Physical Assistance Level No physical assistance   Comment CG with RW  Sit to Stand CARE Score 4   Bed-Chair Transfer   Type of Assistance Needed Incidental touching   Physical Assistance Level No physical assistance   Comment CG with RW  Chair/Bed-to-Chair Transfer CARE Score 4   Meal Prep   Meal Prep Level Walker   Meal Prep Level of Assistance Close supervision;Contact guard   Meal Preparation Pt engaged in light meal prep using stovetop to make scrabbled eggs  Pt noted with ability to manage correct burner appropriately however, does not turn off burner at end of cooking task without BERNARD intervention  No safety concerns noted with making scrabbled eggs  Recommend pt has S with meal prep at time of d/c    Kitchen Mobility   Kitchen-Mobility Level Walker   Kitchen Activity Retrieve items;Transport items   Kitchen Mobility Comments Pt engaged in light meal prep making scrambled eggs  Pt followed spinal precautions when retrieving eggs from fridge, lilian from pantry, and frying pan from bottom cabinet with CS, and CG when reaching for items in low places  No loss of balance noted  Pt places hands on counter for balance when completing task  Seated rest break followed  After completing meal prep, pt washed dishes standing at sink  Light Housekeeping   Light Housekeeping Pt demos ability to stand at sink to wash dishes used during meal prep activity with CS  Cognition   Overall Cognitive Status WFL   Arousal/Participation Alert; Responsive; Cooperative   Attention Within functional limits   Orientation Level Oriented X4   Memory Within functional limits   Following Commands Follows all commands and directions without difficulty   Assessment   Treatment Assessment Pt engaged in 60 min skilled OT session focusing on func transfers, light meal prep, and standing bal/jaylon  See above for full details  Pt tolerated treatment well and has good carryover of spinal precautions throughout session however, required 1 VC when finishing meal prep to turn stove off  Seated rest breaks provided throughout session  Pt and BERNARD discussed FT to take place today in the PM with pt's spouse  Pt will benefit to continue OT sessions focusing on ADL/IADL retraining, func transfers, standing bal/jaylon, and endurance to maximize IND and reduce burden of care at d/c  Prognosis Good   Problem List Decreased strength;Decreased range of motion;Decreased endurance; Impaired balance;Decreased mobility; Decreased coordination;Orthopedic restrictions;Pain   Plan   Treatment/Interventions ADL retraining;Functional transfer training; Therapeutic exercise; Endurance training;Patient/family training;Equipment eval/education; Compensatory technique education   Progress Progressing toward goals   OT Therapy Minutes   OT Time In 0830   OT Time Out 0930   OT Total Time (minutes) 60   OT Mode of treatment - Individual (minutes) 60   OT Mode of treatment - Concurrent (minutes) 0   OT Mode of treatment - Group (minutes) 0   OT Mode of treatment - Co-treat (minutes) 0   OT Mode of Treatment - Total time(minutes) 60 minutes   OT Cumulative Minutes 398   Therapy Time missed   Time missed?  No

## 2022-03-23 NOTE — PROGRESS NOTES
03/23/22 1430   Pain Assessment   Pain Assessment Tool 0-10   Pain Score 4   Pain Location/Orientation Location: Neck;Orientation: Upper; Location: Shoulder   Pain Onset/Description Onset: Ongoing; Onset: Sudden;Frequency: Constant/Continuous; Descriptor: Sore   Hospital Pain Intervention(s)   (RN notified- applied pain cream and gave pt tylenol)   Restrictions/Precautions   Precautions Fall Risk;Pain;Spinal precautions   Braces or Orthoses C/S Collar   Cognition   Overall Cognitive Status WFL   Arousal/Participation Alert; Cooperative   Attention Within functional limits   Orientation Level Oriented X4   Memory Within functional limits   Following Commands Follows all commands and directions without difficulty   Subjective   Subjective pt only c/o neck and upper shoulder pain midway through tx session  Sit to Stand   Type of Assistance Needed Incidental touching;Verbal cues   Sit to Stand CARE Score 4   Bed-Chair Transfer   Type of Assistance Needed Verbal cues; Adaptive equipment;Physical assistance   Physical Assistance Level 25% or less   Chair/Bed-to-Chair Transfer CARE Score 3   Walk 10 Feet   Type of Assistance Needed Physical assistance;Verbal cues   Physical Assistance Level 51%-75%   Walk 10 Feet CARE Score 2   Walk 50 Feet with Two Turns   Type of Assistance Needed Physical assistance;Verbal cues; Adaptive equipment   Physical Assistance Level 51%-75%   Walk 50 Feet with Two Turns CARE Score 2   Walk 150 Feet   Type of Assistance Needed Physical assistance;Verbal cues; Adaptive equipment   Physical Assistance Level 51%-75%   Comment initial walk with SPC required min to mod A which is same when mobilizing without AD   CS when mobilizing with RW     Walk 150 Feet CARE Score 2   Ambulation   Gait Pattern Improper weight shift;Scissoring; Inconsistant Danae;R knee davidson;Trendelenburg; Slow Danae   Curb or Single Stair   Style negotiated Single stair   Type of Assistance Needed Adaptive equipment;Physical assistance;Verbal cues   Physical Assistance Level 25% or less   Comment min A using R rail and SPC on L initial 4 steps then CG if using bilat HR reciprocal pattern  no knee buckling    1 Step (Curb) CARE Score 3   4 Steps   Type of Assistance Needed Physical assistance;Verbal cues; Adaptive equipment   Physical Assistance Level 25% or less   4 Steps CARE Score 3   12 Steps   Type of Assistance Needed Physical assistance;Verbal cues; Adaptive equipment   Physical Assistance Level 25% or less   Comment will trial FF tomorrow with bilat HR    12 Steps CARE Score 3   Therapeutic Interventions   Neuromuscular Re-Education PT directed gait speed training x 2 person assist x 150'   sidestepping and fwd/bwd stepping with L rail x 60' x 2 bouts   stepping bwd x 100' without rail x 2 person assist for safety   Other 5 STS with arms across chest- 18 43 secs which is below age range norm of 12 6 secs which indicates impaired LE strength and risk for falls  Assessment   Treatment Assessment Skilled PT focused on functional mobility training with and without AD as well as NMR/NPP for gait and balance training  Pt cont to song impaired righting reaction due to ongoing weakness and impaired proprioception yuri when mobilizing without AD resulting to scissoring gait pattern at times which increases his risk for falls  Maybe due to prolong immobility sitting on recliner prior to tx session pt was a lot more unsteady during initial gait training using SPC which improved as training progressed  Also song's intermittent onset of slight R knee buckling which led to slight unsteadiness but no major LOB  PT will work on [employer] negotiation training tomorrow as well as cont NPP/NMR training for proximal mm strengthening, balance and gait training to improve overall safety and functional indep using LRAD  Family/Caregiver Present yes    PT Family training done with: wife Rolando Momin observed tx session    also discussed with pt and wife anticipated d/c mobility goals and recommendation for outpatient PT services  Problem List Decreased strength;Decreased range of motion;Decreased endurance; Impaired balance;Decreased mobility; Decreased coordination;Orthopedic restrictions;Pain   Barriers to Discharge Inaccessible home environment;Decreased caregiver support   PT Barriers   Functional Limitation Ramp negotiation;Stair negotiation; Walking;Transfers;Car transfers   Plan   Treatment/Interventions Functional transfer training; Therapeutic exercise; Endurance training;Gait training;Spoke to nursing;OT;Equipment eval/education;Patient/family training   Progress Progressing toward goals   Recommendation   PT Discharge Recommendation Home with outpatient rehabilitation   PT Therapy Minutes   PT Time In 1430   PT Time Out 1530   PT Total Time (minutes) 60   PT Mode of treatment - Individual (minutes) 60   PT Mode of treatment - Concurrent (minutes) 0   PT Mode of treatment - Group (minutes) 0   PT Mode of treatment - Co-treat (minutes) 0   PT Mode of Treatment - Total time(minutes) 60 minutes   PT Cumulative Minutes 420   Therapy Time missed   Time missed?  No

## 2022-03-23 NOTE — PLAN OF CARE
Problem: PAIN - ADULT  Goal: Verbalizes/displays adequate comfort level or baseline comfort level  Description: Interventions:  - Encourage patient to monitor pain and request assistance  - Assess pain using appropriate pain scale  - Administer analgesics based on type and severity of pain and evaluate response  - Implement non-pharmacological measures as appropriate and evaluate response  - Consider cultural and social influences on pain and pain management  - Notify physician/advanced practitioner if interventions unsuccessful or patient reports new pain  Outcome: Progressing     Problem: INFECTION - ADULT  Goal: Absence or prevention of progression during hospitalization  Description: INTERVENTIONS:  - Assess and monitor for signs and symptoms of infection  - Monitor lab/diagnostic results  - Monitor all insertion sites, i e  indwelling lines, tubes, and drains  - Monitor endotracheal if appropriate and nasal secretions for changes in amount and color  - [place] appropriate cooling/warming therapies per order  - Administer medications as ordered  - Instruct and encourage patient and family to use good hand hygiene technique  - Identify and instruct in appropriate isolation precautions for identified infection/condition  Outcome: Progressing  Goal: Absence of fever/infection during neutropenic period  Description: INTERVENTIONS:  - Monitor WBC    Outcome: Progressing     Problem: SAFETY ADULT  Goal: Patient will remain free of falls  Description: INTERVENTIONS:  - Educate patient/family on patient safety including physical limitations  - Instruct patient to call for assistance with activity   - Consult OT/PT to assist with strengthening/mobility   - Keep Call bell within reach  - Keep bed low and locked with side rails adjusted as appropriate  - Keep care items and personal belongings within reach  - Initiate and maintain comfort rounds  - Make Fall Risk Sign visible to staff  - Offer Toileting every  Hours, in advance of need  - Initiate/Maintain alarm  - Obtain necessary fall risk management equipment:   - Apply yellow socks and bracelet for high fall risk patients  - Consider moving patient to room near nurses station  Outcome: Progressing  Goal: Maintain or return to baseline ADL function  Description: INTERVENTIONS:  -  Assess patient's ability to carry out ADLs; assess patient's baseline for ADL function and identify physical deficits which impact ability to perform ADLs (bathing, care of mouth/teeth, toileting, grooming, dressing, etc )  - Assess/evaluate cause of self-care deficits   - Assess range of motion  - Assess patient's mobility; develop plan if impaired  - Assess patient's need for assistive devices and provide as appropriate  - Encourage maximum independence but intervene and supervise when necessary  - Involve family in performance of ADLs  - Assess for home care needs following discharge   - Consider OT consult to assist with ADL evaluation and planning for discharge  - Provide patient education as appropriate  Outcome: Progressing  Goal: Maintains/Returns to pre admission functional level  Description: INTERVENTIONS:  - Perform BMAT or MOVE assessment daily    - Set and communicate daily mobility goal to care team and patient/family/caregiver  - Collaborate with rehabilitation services on mobility goals if consulted  - Perform Range of Motion  times a day  - Reposition patient every  hours    - Dangle patient  times a day  - Stand patient  times a day  - Ambulate patient times a day  - Out of bed to chair times a day   - Out of bed for meals  times a day  - Out of bed for toileting  - Record patient progress and toleration of activity level   Outcome: Progressing     Problem: DISCHARGE PLANNING  Goal: Discharge to home or other facility with appropriate resources  Description: INTERVENTIONS:  - Identify barriers to discharge w/patient and caregiver  - Arrange for needed discharge resources and transportation as appropriate  - Identify discharge learning needs (meds, wound care, etc )  - Arrange for interpretive services to assist at discharge as needed  - Refer to Case Management Department for coordinating discharge planning if the patient needs post-hospital services based on physician/advanced practitioner order or complex needs related to functional status, cognitive ability, or social support system  Outcome: Progressing     Problem: Knowledge Deficit  Goal: Patient/family/caregiver demonstrates understanding of disease process, treatment plan, medications, and discharge instructions  Description: Complete learning assessment and assess knowledge base    Interventions:  - Provide teaching at level of understanding  - Provide teaching via preferred learning methods  Outcome: Progressing     Problem: Potential for Falls  Goal: Patient will remain free of falls  Description: INTERVENTIONS:  - Educate patient/family on patient safety including physical limitations  - Instruct patient to call for assistance with activity   - Consult OT/PT to assist with strengthening/mobility   - Keep Call bell within reach  - Keep bed low and locked with side rails adjusted as appropriate  - Keep care items and personal belongings within reach  - Initiate and maintain comfort rounds  - Make Fall Risk Sign visible to staff  - Offer Toileting every Hours, in advance of need  - Initiate/Maintain alarm  - Obtain necessary fall risk management equipment:   - Apply yellow socks and bracelet for high fall risk patients  - Consider moving patient to room near nurses station  Outcome: Progressing

## 2022-03-23 NOTE — CASE MANAGEMENT
Pt's LCD is 3/28 with d/c on 3/29 from  [facility]  They issued it as a a denial  Peer to peer can be conducted at [phone]

## 2022-03-23 NOTE — PROGRESS NOTES
03/23/22 1230   Pain Assessment   Pain Assessment Tool 0-10   Pain Score No Pain   Restrictions/Precautions   Precautions Fall Risk;Spinal precautions   Braces or Orthoses C/S Collar   Upper Body Dressing   Type of Assistance Needed Physical assistance   Physical Assistance Level Total assistance   Comment to don/doff C/S collar and to switch out pads   Upper Body Dressing CARE Score 1   Cognition   Overall Cognitive Status WFL   Arousal/Participation Alert; Cooperative   Attention Within functional limits   Orientation Level Oriented X4   Memory Within functional limits   Following Commands Follows all commands and directions without difficulty   Activity Tolerance   Activity Tolerance Patient tolerated treatment well   Assessment   Treatment Assessment Pt engages in family training session with pt and spouse focusing on CLOF, C/S collar management, home safety recommendations  See below for full details regarding family training  Pt would continue to benefit from skilled OT focusing on ADL retraining, func transfers, standing jaylon/bal, IADLs, UE strengthening, family training focusing on C/S collar management, in order to decrease burden of care at d/c     OT Family training done with: spouse, Anabel Navarro of family training Family training completed with pt and spouse focusing on CLOF, C/S collar management, home safety recommendations  Discussed pt's CLOF with the anticipated LTG to be IND at time of d/c and that pt's spouse would only need to assist with collar management from an ADL perspective  Discussed with pt and spouse recommendation for RW use at this time and at time of d/c with both receptive, again with anticipation pt will be IND at [address] [place] [address] for household distance transfers  Demo'd how to don/doff C/S collar and how to switch out pads--educated both on when to switch out pads (when soiled or every three days if not soiled) and how to clean pads (Nati soap and warm water--lay flat to dry)   Plan to complete hands on collar management in future sessions with spouse completing switching out pads and donning/doffing  Spouse and pt with no additional questions at this time  Continue family training in PM sessions with spouse  Prognosis Good   Problem List Decreased strength;Decreased range of motion;Decreased endurance; Impaired balance;Decreased mobility; Decreased coordination;Orthopedic restrictions;Pain   Barriers to Discharge Inaccessible home environment;Decreased caregiver support   Plan   Treatment/Interventions ADL retraining;Functional transfer training; Therapeutic exercise; Endurance training;Cognitive reorientation;Patient/family training;Equipment eval/education; Compensatory technique education   OT Therapy Minutes   OT Time In 1230   OT Time Out 1330   OT Total Time (minutes) 60   OT Mode of treatment - Individual (minutes) 60   OT Mode of treatment - Concurrent (minutes) 0   OT Mode of treatment - Group (minutes) 0   OT Mode of treatment - Co-treat (minutes) 0   OT Mode of Treatment - Total time(minutes) 60 minutes   OT Cumulative Minutes 458   Therapy Time missed   Time missed?  No

## 2022-03-23 NOTE — PROGRESS NOTES
Internal Medicine Progress Note  Patient: Narinder Marcus  Age/sex: 71 y o  male  Medical Record #: [id number]      ASSESSMENT/PLAN: (Interval History)  Narinder Marcus is seen and examined and management for following issues:    S/p anterior cervical discectomy and fixation fusion C5-6 and C6-7, posterior cervical decompression and instrumented fusion C3-T1  · Aspen collar at all times  · S/p Decadron   · Pain control and therapy per PMR  · Pt found to have bleeding from the proximal portion of the posterior incision with bruising over the weekend  · Heparin drip stopped  · NS saw pt and recommended frequent dressing changes  PAF  · INR 2 68  · Pt takes Coumadin 5mg Wed and Sat and 2 5mg Mon, Tues, Thurs, Fri and Sun  · Repeat INR thursday     HTN  · Has been elevated during hospitalization possible due to steroids  · Continue amlodipine 5mg 2x daily, hydralazine 50mg every 8 hours and Toprol XL 100mg daily  · BP starting to improve likely d/t steroids being completed  · Will adjust hold parameters for hydralazine      Leiden Factor V  · Coumadin as above  Hyponatremia  · Na stable at 132  · Will continue to monitor  · Repeat bmp thursday    DC planning: TBD  The above assessment and plan was reviewed and updated as determined by my evaluation of the patient on 3/23/2022      Labs:   Results from last 7 days   Lab Units 03/21/22  0614 03/19/22  0529   WBC Thousand/uL 9 22 11 86*   HEMOGLOBIN g/dL 12 1 12 3   HEMATOCRIT % 33 7* 37 3   PLATELETS Thousands/uL 245 222     Results from last 7 days   Lab Units 03/21/22  0614 03/18/22  1148   SODIUM mmol/L 132* 130*   POTASSIUM mmol/L 3 7 3 7   CHLORIDE mmol/L 97* 100   CO2 mmol/L 27 24   BUN mg/dL 13 16   CREATININE mg/dL 0 60 0 60   CALCIUM mg/dL 9 3 9 2         Results from last 7 days   Lab Units 03/22/22  0603 03/21/22  0613   INR  2 68* 2 87*     Results from last 7 days   Lab Units 03/21/22  0926 03/21/22  0625 03/20/22  2110   POC GLUCOSE mg/dl 171* 108 120       Review of Scheduled Meds:  Current Facility-Administered Medications   Medication Dose Route Frequency Provider Last Rate    acetaminophen  975 mg Oral [facility] Dana Peters MD      amLODIPine  5 mg Oral BID Dana Peters MD      atorvastatin  40 mg Oral QPM Dana Peters MD      bisacodyl  10 mg Rectal Daily PRN Dana Peters MD      calcium carbonate  1,000 mg Oral Daily PRN Dana Peters MD      Diclofenac Sodium  2 g Topical 4x Daily PRN Dana Peters MD      docusate sodium  100 mg Oral BID Dana Peters MD      DULoxetine  60 mg Oral HS Dana Peters MD      flecainide  100 mg Oral BID Dana Peters MD      gabapentin  100 mg Oral HS Dana Peters MD      hydrALAZINE  50 mg Oral [facility] Ree Cos, CRNP      lidocaine   Topical 4x Daily PRN Dana Peters MD      methocarbamol  500 mg Oral Q6H PRN Dana Peters MD      metoprolol succinate  100 mg Oral Daily Dana Peters MD      oxyCODONE  2 5 mg Oral Q4H PRN Dana Peters MD      oxyCODONE  5 mg Oral Q4H PRN Dana Peters MD      pantoprazole  40 mg Oral Early Morning Dana Peters MD      polyethylene glycol  17 g Oral Daily PRN Dana Peters MD      senna  1 tablet Oral Daily Dana Peters MD      tamsulosin  0 4 mg Oral Daily With Caden Presley MD      warfarin  2 5 mg Oral Once per day on Sun Mon Tue Thu Fri Henna Hsieh PA-C      warfarin  5 mg Oral Once per day on Wed Sat Henna Hsieh PA-C         Subjective/ HPI: Patient seen and examined  Patients overnight issues or events were reviewed with nursing or staff during rounds or morning huddle session  New or overnight issues include the following:     Pt seen in therapy  Had small amount of drainage on dressing/collar this a m , no evidence of infection       ROS:   A 10 point ROS was performed; negative except as noted above         Imaging:     No orders to display       *Labs /Radiology studies Reviewed  *Medications reviewed and reconciled as needed  *Please refer to order section for additional ordered labs studies  *Case discussed with primary attending during morning huddle case rounds    Physical Examination:  Vitals:   Vitals:    03/22/22 1500 03/22/22 2101 03/23/22 0552 03/23/22 0612   BP: 114/57 160/76  136/72   BP Location: Right arm Right arm  Left arm   Pulse: 72 66  72   Resp: 17 18  18   Temp: 97 9 °F (36 6 °C) 98 °F (36 7 °C)  98 °F (36 7 °C)   TempSrc: Oral Oral  Oral   SpO2: 96% 96%  97%   Weight:   103 kg (228 lb)    Height:         GEN: No apparent distress, interactive  NEURO: Alert and oriented x3  HEENT: Pupils are equal and reactive, EOMI, mucous membranes are moist, face symmetrical; aspen collar in place  CV: S1 S2 regular, no MRG, no peripheral edema noted  RESP: Lungs are clear bilaterally, no wheezes, rales or rhonchi noted, on room air, respirations easy and non labored  GI: Flat, soft non tender, non distended; +BS x4  : Voiding without difficulty  MUSC: Moves all extremities  SKIN: pink, warm and dry, normal turgor, dressing in place      The above physical exam was reviewed and updated as determined by my evaluation of the patient on 3/23/2022  Invasive Devices  Report    None                    VTE Pharmacologic Prophylaxis: Warfarin (Coumadin)  Code Status: Level 3 - DNAR and DNI  Current Length of Stay: 5 day(s)      Total time spent:  30 minutes with more than 50% spent counseling/coordinating care  Counseling includes discussion with patient re: progress  and discussion with patient of his/her current medical state/information  Coordination of patient's care was performed in conjunction with primary service  Time invested included review of patient's labs, vitals, and management of their comorbidities with continued monitoring  In addition, this patient was discussed with medical team including physician and advanced extenders   The care of the patient was extensively discussed and appropriate treatment plan was formulated unique for this patient  ** Please Note:  voice to text software may have been used in the creation of this document   Although proof errors in transcription or interpretation are a potential of such software**

## 2022-03-24 LAB
ANION GAP SERPL CALCULATED.3IONS-SCNC: 5 MMOL/L (ref 4–13)
BASOPHILS # BLD AUTO: 0.02 THOUSANDS/ΜL (ref 0–0.1)
BASOPHILS NFR BLD AUTO: 0 % (ref 0–1)
BUN SERPL-MCNC: 14 MG/DL (ref 5–25)
CALCIUM SERPL-MCNC: 9.3 MG/DL (ref 8.3–10.1)
CHLORIDE SERPL-SCNC: 99 MMOL/L (ref 100–108)
CO2 SERPL-SCNC: 30 MMOL/L (ref 21–32)
CREAT SERPL-MCNC: 0.65 MG/DL (ref 0.6–1.3)
EOSINOPHIL # BLD AUTO: 0.12 THOUSAND/ΜL (ref 0–0.61)
EOSINOPHIL NFR BLD AUTO: 1 % (ref 0–6)
ERYTHROCYTE [DISTWIDTH] IN BLOOD BY AUTOMATED COUNT: 13.3 % (ref 11.6–15.1)
GFR SERPL CREATININE-BSD FRML MDRD: 97 ML/MIN/1.73SQ M
GLUCOSE SERPL-MCNC: 121 MG/DL (ref 65–140)
HCT VFR BLD AUTO: 30.8 % (ref 36.5–49.3)
HGB BLD-MCNC: 11 G/DL (ref 12–17)
IMM GRANULOCYTES # BLD AUTO: 0.04 THOUSAND/UL (ref 0–0.2)
IMM GRANULOCYTES NFR BLD AUTO: 1 % (ref 0–2)
INR PPP: 2.64 (ref 0.84–1.19)
LYMPHOCYTES # BLD AUTO: 1.76 THOUSANDS/ΜL (ref 0.6–4.47)
LYMPHOCYTES NFR BLD AUTO: 20 % (ref 14–44)
MCH RBC QN AUTO: 31.3 PG (ref 26.8–34.3)
MCHC RBC AUTO-ENTMCNC: 35.7 G/DL (ref 31.4–37.4)
MCV RBC AUTO: 88 FL (ref 82–98)
MONOCYTES # BLD AUTO: 0.72 THOUSAND/ΜL (ref 0.17–1.22)
MONOCYTES NFR BLD AUTO: 8 % (ref 4–12)
NEUTROPHILS # BLD AUTO: 5.95 THOUSANDS/ΜL (ref 1.85–7.62)
NEUTS SEG NFR BLD AUTO: 70 % (ref 43–75)
NRBC BLD AUTO-RTO: 0 /100 WBCS
PLATELET # BLD AUTO: 266 THOUSANDS/UL (ref 149–390)
PMV BLD AUTO: 9.3 FL (ref 8.9–12.7)
POTASSIUM SERPL-SCNC: 3.6 MMOL/L (ref 3.5–5.3)
PROTHROMBIN TIME: 26.9 SECONDS (ref 11.6–14.5)
RBC # BLD AUTO: 3.52 MILLION/UL (ref 3.88–5.62)
SODIUM SERPL-SCNC: 134 MMOL/L (ref 136–145)
WBC # BLD AUTO: 8.61 THOUSAND/UL (ref 4.31–10.16)

## 2022-03-24 PROCEDURE — 97116 GAIT TRAINING THERAPY: CPT

## 2022-03-24 PROCEDURE — 80048 BASIC METABOLIC PNL TOTAL CA: CPT | Performed by: PHYSICIAN ASSISTANT

## 2022-03-24 PROCEDURE — 97112 NEUROMUSCULAR REEDUCATION: CPT

## 2022-03-24 PROCEDURE — 85610 PROTHROMBIN TIME: CPT | Performed by: NURSE PRACTITIONER

## 2022-03-24 PROCEDURE — NC001 PR NO CHARGE

## 2022-03-24 PROCEDURE — 85025 COMPLETE CBC W/AUTO DIFF WBC: CPT | Performed by: PHYSICIAN ASSISTANT

## 2022-03-24 PROCEDURE — 97535 SELF CARE MNGMENT TRAINING: CPT

## 2022-03-24 PROCEDURE — 99232 SBSQ HOSP IP/OBS MODERATE 35: CPT

## 2022-03-24 PROCEDURE — 97110 THERAPEUTIC EXERCISES: CPT

## 2022-03-24 PROCEDURE — 97530 THERAPEUTIC ACTIVITIES: CPT

## 2022-03-24 PROCEDURE — 99232 SBSQ HOSP IP/OBS MODERATE 35: CPT | Performed by: INTERNAL MEDICINE

## 2022-03-24 RX ORDER — CEPHALEXIN 500 MG/1
500 CAPSULE ORAL EVERY 6 HOURS SCHEDULED
Status: DISCONTINUED | OUTPATIENT
Start: 2022-03-24 | End: 2022-03-29 | Stop reason: HOSPADM

## 2022-03-24 RX ADMIN — PANTOPRAZOLE SODIUM 40 MG: 40 TABLET, DELAYED RELEASE ORAL at 07:01

## 2022-03-24 RX ADMIN — TAMSULOSIN HYDROCHLORIDE 0.4 MG: 0.4 CAPSULE ORAL at 16:18

## 2022-03-24 RX ADMIN — OXYCODONE HYDROCHLORIDE 2.5 MG: 5 TABLET ORAL at 10:42

## 2022-03-24 RX ADMIN — ACETAMINOPHEN 975 MG: 325 TABLET ORAL at 14:39

## 2022-03-24 RX ADMIN — FLECAINIDE ACETATE 100 MG: 100 TABLET ORAL at 10:34

## 2022-03-24 RX ADMIN — METHOCARBAMOL 500 MG: 500 TABLET ORAL at 17:13

## 2022-03-24 RX ADMIN — GABAPENTIN 100 MG: 100 CAPSULE ORAL at 21:11

## 2022-03-24 RX ADMIN — FLECAINIDE ACETATE 100 MG: 100 TABLET ORAL at 17:10

## 2022-03-24 RX ADMIN — AMLODIPINE BESYLATE 5 MG: 5 TABLET ORAL at 10:34

## 2022-03-24 RX ADMIN — OXYCODONE HYDROCHLORIDE 2.5 MG: 5 TABLET ORAL at 20:15

## 2022-03-24 RX ADMIN — STANDARDIZED SENNA CONCENTRATE 8.6 MG: 8.6 TABLET ORAL at 10:33

## 2022-03-24 RX ADMIN — METOPROLOL SUCCINATE 100 MG: 100 TABLET, EXTENDED RELEASE ORAL at 10:33

## 2022-03-24 RX ADMIN — CEPHALEXIN 500 MG: 500 CAPSULE ORAL at 23:58

## 2022-03-24 RX ADMIN — DOCUSATE SODIUM 100 MG: 100 CAPSULE, LIQUID FILLED ORAL at 17:10

## 2022-03-24 RX ADMIN — ACETAMINOPHEN 975 MG: 325 TABLET ORAL at 21:10

## 2022-03-24 RX ADMIN — WARFARIN SODIUM 2.5 MG: 5 TABLET ORAL at 17:10

## 2022-03-24 RX ADMIN — CEPHALEXIN 500 MG: 500 CAPSULE ORAL at 17:10

## 2022-03-24 RX ADMIN — AMLODIPINE BESYLATE 5 MG: 5 TABLET ORAL at 21:10

## 2022-03-24 RX ADMIN — ATORVASTATIN CALCIUM 40 MG: 40 TABLET, FILM COATED ORAL at 17:10

## 2022-03-24 RX ADMIN — DULOXETINE HYDROCHLORIDE 60 MG: 60 CAPSULE, DELAYED RELEASE ORAL at 21:10

## 2022-03-24 RX ADMIN — DOCUSATE SODIUM 100 MG: 100 CAPSULE, LIQUID FILLED ORAL at 10:33

## 2022-03-24 RX ADMIN — DICLOFENAC SODIUM 2 G: 10 GEL TOPICAL at 13:45

## 2022-03-24 NOTE — PROGRESS NOTES
PMR Quick Note:    Spoke with NSx-AP Walker regarding incisional erythema/edema, and largely sanginous drainage  Patient afebrile and without leukocytosis today  Neuro exam stable  We are in agreement to start Abx at this time  Will start Keflex 500mg Q6H and await official Nsx follow-up

## 2022-03-24 NOTE — PROGRESS NOTES
03/24/22 1250   Pain Assessment   Pain Assessment Tool 0-10   Pain Score 6  (5-6/10)   Pain Location/Orientation Orientation: Left; Location: Neck   Pain Radiating Towards shoulder   Pain Onset/Description Onset: Ongoing;Frequency: Constant/Continuous   Hospital Pain Intervention(s) Cold applied; Rest   Restrictions/Precautions   Precautions Fall Risk;Spinal precautions;Pain   Braces or Orthoses C/S Collar   Cognition   Overall Cognitive Status WFL   Arousal/Participation Alert; Cooperative   Attention Within functional limits   Subjective   Subjective pt reported slight burning sensation on L shoulder that radiates up to behind the ear, RN notified  voltaren cream was eventually applied after cold pack application  not yet due to any pain meds during tx session while RN unable to give tylenol for awaiting delivery from pharmacy  Lying to Sitting on Side of Bed   Type of Assistance Needed Supervision   Comment HOB flat on left side of the bed without rail -log roll technique   Lying to Sitting on Side of Bed CARE Score 4   Sit to Stand   Type of Assistance Needed Incidental touching;Verbal cues; Adaptive equipment   Comment CG-CS with SPC   Sit to Stand CARE Score 4   Bed-Chair Transfer   Type of Assistance Needed Incidental touching;Verbal cues; Adaptive equipment   Comment SPC    Chair/Bed-to-Chair Transfer CARE Score 4   Walk 10 Feet   Type of Assistance Needed Verbal cues; Adaptive equipment; Incidental touching   Physical Assistance Level 25% or less   Comment 20' x 1, CG with SPC    Walk 10 Feet CARE Score 3   Walk 50 Feet with Two Turns   Type of Assistance Needed Incidental touching;Verbal cues; Adaptive equipment   Walk 50 Feet with Two Turns CARE Score 4   Walk 150 Feet   Type of Assistance Needed Incidental touching;Verbal cues; Adaptive equipment   Comment CG with SPC x 200' x 2 reps    Walk 150 Feet CARE Score 4   Curb or Single Stair   Style negotiated Single stair   Type of Assistance Needed Adaptive equipment; Incidental touching;Verbal cues   1 Step (Curb) CARE Score 4   4 Steps   Type of Assistance Needed Incidental touching;Verbal cues; Adaptive equipment   4 Steps CARE Score 4   12 Steps   Type of Assistance Needed Incidental touching;Verbal cues; Adaptive equipment   Comment FF with bilat HR, reciprocal pattern, VC to advance hands along the rail    12 Steps CARE Score 4   Therapeutic Interventions   Strengthening seated TE during cold pack application include resisted bilat hamstring curls with green tband x 12 reps x 3 sets, 3# ankle wts for seated marches and LAQ x 12 reps x 3 sets  resisted clamshell with green tband x 5 SH x 12 reps x 3 sets, resisted DF with green tband x 30 reps   Neuromuscular Re-Education repeated stepping over different sizes obstacles with 1 rail for light support while looking straight ahead  repeated walking fwd/bwd with light rail support while wearing 3# ankle wts x 3 reps x 2 sets  side stepping with 3# ankle wts with light rail support x 2 reps    Assessment   Treatment Assessment Despite the pain pt able to tolerate skilled PT which focused on NMR/NPP training,strengthening and functional mobility training with SPC with emphasis on FF negotiation  Pt significantly steadier during mobility training with SPC without LOB or scissoring gait pattern demonstrated  PT/OT will assess pt tomorrow for IRP appropriateness using a RW  As well as cont with NPP/NMR without AD or SPC for light support for balance and gait training, strengthening exercises and FF stair negotiation training to improve overall functional indep in preparation for home d/c on 3/29/22 with outpatient PT services      Family/Caregiver Present yes   PT Family training done with: wife Rosa Score arrived midway and observed tx session including FF negotiation    Barriers to Discharge Inaccessible home environment;Decreased caregiver support   Barriers to Discharge Comments wife is on Ox and will not be able to provide physical assist     PT Barriers   Functional Limitation Ramp negotiation;Stair negotiation; Walking   Plan   Treatment/Interventions Functional transfer training;LE strengthening/ROM; Therapeutic exercise; Endurance training;Gait training;Spoke to nursing;OT   Progress Progressing toward goals   Recommendation   PT Discharge Recommendation Home with outpatient rehabilitation   PT - OK to Discharge No   PT Therapy Minutes   PT Time In 1250   PT Time Out 1400   PT Total Time (minutes) 70   PT Mode of treatment - Individual (minutes) 70   PT Mode of treatment - Concurrent (minutes) 0   PT Mode of treatment - Group (minutes) 0   PT Mode of treatment - Co-treat (minutes) 0   PT Mode of Treatment - Total time(minutes) 70 minutes   PT Cumulative Minutes 490

## 2022-03-24 NOTE — PROGRESS NOTES
03/24/22 0900   Pain Assessment   Pain Assessment Tool 0-10   Pain Score 6   Pain Location/Orientation Location: Neck   Restrictions/Precautions   Precautions Fall Risk;Pain;Spinal precautions   Braces or Orthoses C/S Collar   Oral Hygiene   Type of Assistance Needed Supervision   Physical Assistance Level No physical assistance   Comment S in stance at sink   Oral Hygiene CARE Score 4   Shower/Bathe Self   Type of Assistance Needed Incidental touching   Physical Assistance Level No physical assistance   Comment CGA in stance to wash buttock/daniel with UE support on GBs  Pt washed reaminging aspects seated on shower chair  front and back inscision covered and dressing changed after shower  Completed shower with marybeth collar donned   Shower/Bathe Self CARE Score 4   Upper Body Dressing   Type of Assistance Needed Physical assistance   Physical Assistance Level 76% or more   Comment Change C-Collar pt able to delio shirt   Upper Body Dressing CARE Score 2   Lower Body Dressing   Type of Assistance Needed Incidental touching   Physical Assistance Level No physical assistance   Comment CGA in stance for CM over hips  Luz Elena Began with cross leg technique   Lower Body Dressing CARE Score 4   Putting On/Taking Off Footwear   Type of Assistance Needed Supervision   Physical Assistance Level No physical assistance   Putting On/Taking Off Footwear CARE Score 4   Sit to Stand   Type of Assistance Needed Incidental touching   Physical Assistance Level No physical assistance   Comment CG-CS   Sit to Stand CARE Score 4   Bed-Chair Transfer   Type of Assistance Needed Physical assistance   Physical Assistance Level 25% or less   Comment Min A with no AD   CS with RW   Chair/Bed-to-Chair Transfer CARE Score 3   Toileting Hygiene   Type of Assistance Needed Incidental touching   Physical Assistance Level No physical assistance   Comment urinated in stance with slight management of CM down hips followed by CM up in stance at [facility] Toileting Hygiene CARE Score 4   Toilet Transfer   Type of Assistance Needed Physical assistance   Physical Assistance Level 25% or less   Comment Min A with no AD  CS with RW   Toilet Transfer CARE Score 3   Cognition   Overall Cognitive Status [facility]   Arousal/Participation Alert; Cooperative   Attention Within functional limits   Orientation Level Oriented X4   Memory Within functional limits   Following Commands Follows all commands and directions without difficulty   Activity Tolerance   Activity Tolerance Patient tolerated treatment well   Assessment   Treatment Assessment Pt engaged in skilled OT session with focus on: completing ADL (shower), functional transfers, and standing tolerance/balance  Pt tolerated session well, completing most of ADL at CGA level  Pt continues to require Max A for C-Collar management but does well maintaining neck position when changing from seirra to Rojsa  Nursing present to complete dressing change  MD and PA present 2* distal changes noted  Pt continues to demo good ability to follow all spinal precautions throughout ADLs and functional transfers  Plan for SO FT today with PT then continue training in upcoming sessions  Pt will benefit from continued skilled OT to maximize indep and safety with ADLs and functional transfers  Continue POC with focus on: ADL retraining, functional transfers, RW vs no RW, compensation techniques, education, UE strengthening/endurance training  Prognosis Good   Problem List Decreased strength;Decreased range of motion;Decreased endurance; Impaired balance;Decreased mobility; Decreased coordination;Orthopedic restrictions;Pain   Barriers to Discharge Inaccessible home environment;Decreased caregiver support   Plan   Treatment/Interventions ADL retraining;Functional transfer training; Therapeutic exercise; Endurance training;Patient/family training;Equipment eval/education; Compensatory technique education   Progress Progressing toward goals Recommendation   OT Discharge Recommendation   (no OT needs upon DC)   OT Therapy Minutes   OT Time In 0900   OT Time Out 1040   OT Total Time (minutes) 100   OT Mode of treatment - Individual (minutes) 100   OT Mode of treatment - Concurrent (minutes) 0   OT Mode of treatment - Group (minutes) 0   OT Mode of treatment - Co-treat (minutes) 0   OT Mode of Treatment - Total time(minutes) 100 minutes   OT Cumulative Minutes 558

## 2022-03-24 NOTE — PROGRESS NOTES
Internal Medicine Progress Note  Patient: Kavita Magdaleno  Age/sex: 71 y o  male  Medical Record #: [id number]      ASSESSMENT/PLAN: (Interval History)  Kavita Magdaleno is seen and examined and management for following issues:    S/p anterior cervical discectomy and fixation fusion C5-6 and C6-7, posterior cervical decompression and instrumented fusion C3-T1  · Aspen collar at all times  · S/p Decadron   · Pain control and therapy per PMR  · Pt found to have bleeding from the proximal portion of the posterior incision with bruising over the weekend  Now with serosanguinous drainage from the distal portion  Pt with areas of skin breakdown at the staples at the distal portion of the incision  Possibly due to moisture  NS notified  · Heparin drip stopped  PAF  · INR 2 64  · Pt takes Coumadin 5mg Wed and Sat and 2 5mg Mon, Tues, Thurs, Fri and Sun  · Monitor INR  HTN  · Has been elevated during hospitalization possible due to steroids  · Continue amlodipine 5mg 2x daily, hydralazine 50mg every 8 hours and Toprol XL 100mg daily  · BP starting to improve likely d/t steroids being completed  · Will adjust hold parameters for hydralazine - held for the past 2 doses  May DC later today  Leiden Factor V  · Coumadin as above  Hyponatremia  · Na improving - 134  · Will continue to monitor  DC planning: TBD  The above assessment and plan was reviewed and updated as determined by my evaluation of the patient on 3/24/2022      Labs:   Results from last 7 days   Lab Units 03/24/22  0706 03/21/22  0614   WBC Thousand/uL 8 61 9 22   HEMOGLOBIN g/dL 11 0* 12 1   HEMATOCRIT % 30 8* 33 7*   PLATELETS Thousands/uL 266 245     Results from last 7 days   Lab Units 03/24/22  0706 03/21/22  0614   SODIUM mmol/L 134* 132*   POTASSIUM mmol/L 3 6 3 7   CHLORIDE mmol/L 99* 97*   CO2 mmol/L 30 27   BUN mg/dL 14 13   CREATININE mg/dL 0 65 0 60   CALCIUM mg/dL 9 3 9 3         Results from last 7 days   Lab Units 03/24/22  0706 03/22/22  0603   INR  2 64* 2 68*     Results from last 7 days   Lab Units 03/21/22  0926 03/21/22  0625 03/20/22  2110   POC GLUCOSE mg/dl 171* 108 120       Review of Scheduled Meds:  Current Facility-Administered Medications   Medication Dose Route Frequency Provider Last Rate    acetaminophen  975 mg Oral [facility] Issac Mendes MD      amLODIPine  5 mg Oral BID Issac Mendes MD      atorvastatin  40 mg Oral QPM Issac Mendes MD      bisacodyl  10 mg Rectal Daily PRN Issac Mendes MD      calcium carbonate  1,000 mg Oral Daily PRN Issac Mendes MD      Diclofenac Sodium  2 g Topical 4x Daily PRN Issac Mendes MD      docusate sodium  100 mg Oral BID Issac Mendes MD      DULoxetine  60 mg Oral HS Issac Mendes MD      flecainide  100 mg Oral BID Issac Mendes MD      gabapentin  100 mg Oral HS Issac Mendes MD      hydrALAZINE  50 mg Oral [facility] DEEPTHI Jurado      lidocaine   Topical 4x Daily PRN Issac Mendes MD      methocarbamol  500 mg Oral Q6H PRN Issac Mendes MD      metoprolol succinate  100 mg Oral Daily Issac Mendes MD      oxyCODONE  2 5 mg Oral Q4H PRN Issac Mendes MD      oxyCODONE  5 mg Oral Q4H PRN Issac Mendes MD      pantoprazole  40 mg Oral Early Morning Issac Mendes MD      polyethylene glycol  17 g Oral Daily PRN Issac Mendes MD      senna  1 tablet Oral Daily Issac Mendes MD      tamsulosin  0 4 mg Oral Daily With Holly Jose MD      warfarin  2 5 mg Oral Once per day on Sun Mon Tue Thu Fri Henna Hsieh PA-C      warfarin  5 mg Oral Once per day on Wed Sat Henna Hsieh PA-C         Subjective/ HPI: Patient seen and examined  Patients overnight issues or events were reviewed with nursing or staff during rounds or morning huddle session  New or overnight issues include the following:     Pt seen in his room with OT changing the posterior neck incision   A moderate amount of drainage was present on the dressing  Pt has some skin breakdown at the staples in the distal portion of the incision  He denies any pain or other complaints  ROS:   A 10 point ROS was performed; negative except as noted above  Imaging:     No orders to display       *Labs /Radiology studies Reviewed  *Medications  reviewed and reconciled as needed  *Please refer to order section for additional ordered labs studies  *Case discussed with primary attending during morning huddle case rounds    Physical Examination:  Vitals:   Vitals:    03/23/22 2106 03/23/22 2318 03/24/22 0600 03/24/22 0700   BP: 124/63 128/70  126/59   BP Location:  Left arm  Left arm   Pulse:  58  72   Resp:  18  18   Temp:  98 1 °F (36 7 °C)  98 4 °F (36 9 °C)   TempSrc:  Oral  Oral   SpO2:  97%  95%   Weight:   103 kg (227 lb 1 2 oz)    Height:         GEN: No apparent distress, interactive  NEURO: Alert and oriented x3  HEENT: Pupils are equal and reactive, EOMI, mucous membranes are moist, face symmetrical; aspen collar in place  Pics of incision in media section  CV: S1 S2 regular, no MRG, no peripheral edema noted  RESP: Lungs are clear bilaterally, no wheezes, rales or rhonchi noted, on room air, respirations easy and non labored  GI: Flat, soft non tender, non distended; +BS x4  : Voiding without difficulty  MUSC: Moves all extremities  SKIN: pink, warm and dry, normal turgor, dressing in place    The above physical exam was reviewed and updated as determined by my evaluation of the patient on 3/24/2022  Invasive Devices  Report    None                    VTE Pharmacologic Prophylaxis: Warfarin (Coumadin)  Code Status: Level 3 - DNAR and DNI  Current Length of Stay: 6 day(s)      Total time spent:  30 minutes with more than 50% spent counseling/coordinating care  Counseling includes discussion with patient re: progress  and discussion with patient of his/her current medical state/information   Coordination of patient's care was performed in conjunction with primary service  Time invested included review of patient's labs, vitals, and management of their comorbidities with continued monitoring  In addition, this patient was discussed with medical team including physician and advanced extenders  The care of the patient was extensively discussed and appropriate treatment plan was formulated unique for this patient  ** Please Note:  voice to text software may have been used in the creation of this document   Although proof errors in transcription or interpretation are a potential of such software**

## 2022-03-24 NOTE — PLAN OF CARE
Problem: PAIN - ADULT  Goal: Verbalizes/displays adequate comfort level or baseline comfort level  Description: Interventions:  - Encourage patient to monitor pain and request assistance  - Assess pain using appropriate pain scale  - Administer analgesics based on type and severity of pain and evaluate response  - Implement non-pharmacological measures as appropriate and evaluate response  - Consider cultural and social influences on pain and pain management  - Notify physician/advanced practitioner if interventions unsuccessful or patient reports new pain  Outcome: Progressing     Problem: INFECTION - ADULT  Goal: Absence or prevention of progression during hospitalization  Description: INTERVENTIONS:  - Assess and monitor for signs and symptoms of infection  - Monitor lab/diagnostic results  - Monitor all insertion sites, i e  indwelling lines, tubes, and drains  - Monitor endotracheal if appropriate and nasal secretions for changes in amount and color  - [place] appropriate cooling/warming therapies per order  - Administer medications as ordered  - Instruct and encourage patient and family to use good hand hygiene technique  - Identify and instruct in appropriate isolation precautions for identified infection/condition  Outcome: Progressing  Goal: Absence of fever/infection during neutropenic period  Description: INTERVENTIONS:  - Monitor WBC    Outcome: Progressing     Problem: SAFETY ADULT  Goal: Patient will remain free of falls  Description: INTERVENTIONS:  - Educate patient/family on patient safety including physical limitations  - Instruct patient to call for assistance with activity   - Consult OT/PT to assist with strengthening/mobility   - Keep Call bell within reach  - Keep bed low and locked with side rails adjusted as appropriate  - Keep care items and personal belongings within reach  - Initiate and maintain comfort rounds  - Make Fall Risk Sign visible to staff  - Offer Toiletings, in advance of need  - Initiate/M  - Obtain necessary fall risk management  - Apply yellow socks and bracelet for high fall risk patients  - Consider moving patient to room near nurses station  Outcome: Progressing  Goal: Maintain or return to baseline ADL function  Description: INTERVENTIONS:  -  Assess patient's ability to carry out ADLs; assess patient's baseline for ADL function and identify physical deficits which impact ability to perform ADLs (bathing, care of mouth/teeth, toileting, grooming, dressing, etc )  - Assess/evaluate cause of self-care deficits   - Assess range of motion  - Assess patient's mobility; develop plan if impaired  - Assess patient's need for assistive devices and provide as appropriate  - Encourage maximum independence but intervene and supervise when necessary  - Involve family in performance of ADLs  - Assess for home care needs following discharge   - Consider OT consult to assist with ADL evaluation and planning for discharge  - Provide patient education as appropriate  Outcome: Progressing  Goal: Maintains/Returns to pre admission functional level  Description: INTERVENTIONS:  - Perform BMAT or MOVE assessment daily    - Set and communicate daily mobility goal to care team and patient/family/caregiver  - Collaborate with rehabilitation services on mobility goals if consulted  - Perform Range oimes a day  - Reposition paturs    - Dangle pa  - Stand blaine  - Ambulate   - Out of bed to ivon  - Out of be  - Out of bed for toileting  - Record patient progress and toleration of activity level   Outcome: Progressing     Problem: DISCHARGE PLANNING  Goal: Discharge to home or other facility with appropriate resources  Description: INTERVENTIONS:  - Identify barriers to discharge w/patient and caregiver  - Arrange for needed discharge resources and transportation as appropriate  - Identify discharge learning needs (meds, wound care, etc )  - Arrange for interpretive services to assist at discharge as needed  - Refer to Case Management Department for coordinating discharge planning if the patient needs post-hospital services based on physician/advanced practitioner order or complex needs related to functional status, cognitive ability, or social support system  Outcome: Progressing     Problem: Knowledge Deficit  Goal: Patient/family/caregiver demonstrates understanding of disease process, treatment plan, medications, and discharge instructions  Description: Complete learning assessment and assess knowledge base    Interventions:  - Provide teaching at level of understanding  - Provide teaching via preferred learning methods  Outcome: Progressing     Problem: Potential for Falls  Goal: Patient will remain free of falls  Description: INTERVENTIONS:  - Educate patient/family on patient safety including physical limitations  - Instruct patient to call for assistance with activity   - Consult OT/PT to assist with strengthening/mobility   - Keep Call bell within reach  - Keep bed low and locked with side rails adjusted as appropriate  - Keep care items and personal belongings within reach  - Initiate and maintain comfort rounds  - Make Fall Risk Sign visible to staff  - Offer Toileting every urs, in advance of need  - Initiate/Maintain   - Obtain necessary fall risk management equipment:  - Apply yellow socks and bracelet for high fall risk patients  - Consider moving patient to room near nurses station  Outcome: Progressing

## 2022-03-24 NOTE — QUICK NOTE
Quick note    3/24/2022    Alvaro Rai    Age 71 year    Date of birth 02/11/1949    Patient location [facility] [address]    Follow-up progress and wound check    Mr  Jocelyne Kendall well known to me    Does is making good progress in rehab    Noticed a few burning twinges in left high para sagittal low suboccipital neck region    Likely from stretching of parasagittal muscles andor medial fibers supraspinatus muscle    Posterior incision appears to be healing well staples insitu    There is some black and blue appearance to either side of his incision 2 to 3 inches  involve skin subcutaneous tissues    Some dark serous staining to dressing clean 4x4    No fluctuance no discharge that can be expressed    The changes are expected in view of the long incision with muscular separation stripping either side of the spinous process and him being back on Coumadin  Serosanguineous oozing very common  Should subside  Re aaply dry dressings    No evidence of any infection  Temperature profile flat   WBC count normal    No need for antibiotic    Do not see real need for cervicothoracic CT scan at this stage    His construct and alignment looks great on plain x-rays    Would keep staples longer up to 21 days    The cervico cervical junction is prone to dehisce especially with any shoulder abduction movements with his hands above 90°      Excessive arm and hand activity above 90° above the shoulder disc can tend to increase the possibility dehiscence of the incision at the cervico cervical level C6-7 T1    He is back on Coumadin for his factor 5 Leiden deficiency    He is status post Anterior cervical discectomy and fixation fusion C5/6 and C6/7; Posterior cervical decompression and instrumented fusion C3-T1 with Dr Jose Francisco Wells on 3/12/2022    [facility] Factor five Leiden, deficiency - diagnosed 15 years ago after spontaneous left DVT, other recurrent DVTs and PE  paroxysmal atrial fibrillation, moderate aortic stenosis sleep apnea on CPAP      Impression:  Satisfactory early progress in [facility]    3/24/2022 6:19 PM    Naty Wilks MD, Attending Neurological Surgeon

## 2022-03-24 NOTE — PROGRESS NOTES
03/24/22 1250   Pain Assessment   Pain Assessment Tool 0-10   Pain Score 6  (5-6/10)   Pain Location/Orientation Orientation: Left; Location: Neck   Pain Radiating Towards shoulder   Pain Onset/Description Onset: Ongoing;Frequency: Constant/Continuous   Hospital Pain Intervention(s) Cold applied; Rest   Restrictions/Precautions   Precautions Fall Risk;Spinal precautions;Pain   Braces or Orthoses C/S Collar   Cognition   Overall Cognitive Status WFL   Arousal/Participation Alert; Cooperative   Attention Within functional limits   Subjective   Subjective pt reported slight burning sensation on L shoulder that radiates up to behind the ear, RN notified  voltaren cream was eventually applied after cold pack application  not yet due to any pain meds during tx session while RN unable to give tylenol for awaiting delivery from pharmacy  Lying to Sitting on Side of Bed   Type of Assistance Needed Supervision   Comment HOB flat on left side of the bed without rail -log roll technique   Lying to Sitting on Side of Bed CARE Score 4   Sit to Stand   Type of Assistance Needed Incidental touching;Verbal cues; Adaptive equipment   Comment CG-CS with SPC   Sit to Stand CARE Score 4   Bed-Chair Transfer   Type of Assistance Needed Incidental touching;Verbal cues; Adaptive equipment   Comment SPC    Chair/Bed-to-Chair Transfer CARE Score 4   Walk 10 Feet   Type of Assistance Needed Verbal cues; Adaptive equipment; Incidental touching   Physical Assistance Level 25% or less   Comment 20' x 1, CG with SPC    Walk 10 Feet CARE Score 3   Walk 50 Feet with Two Turns   Type of Assistance Needed Incidental touching;Verbal cues; Adaptive equipment   Walk 50 Feet with Two Turns CARE Score 4   Walk 150 Feet   Type of Assistance Needed Incidental touching;Verbal cues; Adaptive equipment   Comment CG with SPC x 200' x 2 reps    Walk 150 Feet CARE Score 4   Curb or Single Stair   Style negotiated Single stair   Type of Assistance Needed Adaptive equipment; Incidental touching;Verbal cues   1 Step (Curb) CARE Score 4   4 Steps   Type of Assistance Needed Incidental touching;Verbal cues; Adaptive equipment   4 Steps CARE Score 4   12 Steps   Type of Assistance Needed Incidental touching;Verbal cues; Adaptive equipment   Comment FF with bilat HR, reciprocal pattern, VC to advance hands along the rail    12 Steps CARE Score 4   Therapeutic Interventions   Strengthening seated TE during cold pack application include resisted bilat hamstring curls with green tband x 12 reps x 3 sets, 3# ankle wts for seated marches and LAQ x 12 reps x 3 sets  resisted clamshell with green tband x 5 SH x 12 reps x 3 sets, resisted DF with green tband x 30 reps   Neuromuscular Re-Education repeated stepping over different sizes obstacles with 1 rail for light support while looking straight ahead  repeated walking fwd/bwd with light rail support while wearing 3# ankle wts x 3 reps x 2 sets  side stepping with 3# ankle wts with light rail support x 2 reps    Assessment   Treatment Assessment Despite the pain pt able to tolerate skilled PT which focused on NMR/NPP training,strengthening and functional mobility training with SPC with emphasis on FF negotiation  Pt significantly steadier during mobility training with SPC without LOB or scissoring gait pattern demonstrated  PT/OT will assess pt tomorrow for IRP appropriateness using a RW  As well as cont with NPP/NMR without AD or SPC for light support for balance and gait training, strengthening exercises and FF stair negotiation training to improve overall functional indep in preparation for home d/c on 3/29/22 with outpatient PT services      Family/Caregiver Present yes   PT Family training done with: wife Romero Lara arrived midway and observed tx session including FF negotiation    Barriers to Discharge Inaccessible home environment;Decreased caregiver support   Barriers to Discharge Comments wife is on Ox and will not be able to provide physical assist     PT Barriers   Functional Limitation Ramp negotiation;Stair negotiation; Walking   Plan   Treatment/Interventions Functional transfer training;LE strengthening/ROM; Therapeutic exercise; Endurance training;Gait training;Spoke to nursing;OT   Progress Progressing toward goals   Recommendation   PT Discharge Recommendation Home with outpatient rehabilitation   PT - OK to Discharge No   PT Therapy Minutes   PT Time In 1250   PT Time Out 1430   PT Total Time (minutes) 100   PT Mode of treatment - Individual (minutes) 100   PT Mode of treatment - Concurrent (minutes) 0   PT Mode of treatment - Group (minutes) 0   PT Mode of treatment - Co-treat (minutes) 0   PT Mode of Treatment - Total time(minutes) 100 minutes   PT Cumulative Minutes 520

## 2022-03-25 ENCOUNTER — TELEPHONE (OUTPATIENT)
Dept: HEMATOLOGY ONCOLOGY | Facility: CLINIC | Age: 72
End: 2022-03-25

## 2022-03-25 LAB — GLUCOSE SERPL-MCNC: 130 MG/DL (ref 65–140)

## 2022-03-25 PROCEDURE — 97535 SELF CARE MNGMENT TRAINING: CPT

## 2022-03-25 PROCEDURE — 97530 THERAPEUTIC ACTIVITIES: CPT

## 2022-03-25 PROCEDURE — 99232 SBSQ HOSP IP/OBS MODERATE 35: CPT | Performed by: INTERNAL MEDICINE

## 2022-03-25 PROCEDURE — 97110 THERAPEUTIC EXERCISES: CPT

## 2022-03-25 PROCEDURE — 82948 REAGENT STRIP/BLOOD GLUCOSE: CPT

## 2022-03-25 PROCEDURE — 97112 NEUROMUSCULAR REEDUCATION: CPT

## 2022-03-25 PROCEDURE — 99024 POSTOP FOLLOW-UP VISIT: CPT | Performed by: PHYSICIAN ASSISTANT

## 2022-03-25 PROCEDURE — 99232 SBSQ HOSP IP/OBS MODERATE 35: CPT

## 2022-03-25 PROCEDURE — 97116 GAIT TRAINING THERAPY: CPT

## 2022-03-25 RX ORDER — GABAPENTIN 100 MG/1
100 CAPSULE ORAL DAILY
Status: DISCONTINUED | OUTPATIENT
Start: 2022-03-25 | End: 2022-03-29 | Stop reason: HOSPADM

## 2022-03-25 RX ORDER — WARFARIN SODIUM 5 MG/1
5 TABLET ORAL
Status: DISCONTINUED | OUTPATIENT
Start: 2022-03-30 | End: 2022-03-29

## 2022-03-25 RX ORDER — WARFARIN SODIUM 2.5 MG/1
2.5 TABLET ORAL
Status: DISCONTINUED | OUTPATIENT
Start: 2022-03-25 | End: 2022-03-28

## 2022-03-25 RX ORDER — GABAPENTIN 300 MG/1
300 CAPSULE ORAL
Status: DISCONTINUED | OUTPATIENT
Start: 2022-03-25 | End: 2022-03-29 | Stop reason: HOSPADM

## 2022-03-25 RX ADMIN — GABAPENTIN 100 MG: 100 CAPSULE ORAL at 10:57

## 2022-03-25 RX ADMIN — CEPHALEXIN 500 MG: 500 CAPSULE ORAL at 23:39

## 2022-03-25 RX ADMIN — CEPHALEXIN 500 MG: 500 CAPSULE ORAL at 17:09

## 2022-03-25 RX ADMIN — AMLODIPINE BESYLATE 5 MG: 5 TABLET ORAL at 21:20

## 2022-03-25 RX ADMIN — TAMSULOSIN HYDROCHLORIDE 0.4 MG: 0.4 CAPSULE ORAL at 17:09

## 2022-03-25 RX ADMIN — ACETAMINOPHEN 975 MG: 325 TABLET ORAL at 13:58

## 2022-03-25 RX ADMIN — FLECAINIDE ACETATE 100 MG: 100 TABLET ORAL at 10:56

## 2022-03-25 RX ADMIN — HYDRALAZINE HYDROCHLORIDE 50 MG: 50 TABLET, FILM COATED ORAL at 13:59

## 2022-03-25 RX ADMIN — ACETAMINOPHEN 975 MG: 325 TABLET ORAL at 21:19

## 2022-03-25 RX ADMIN — ATORVASTATIN CALCIUM 40 MG: 40 TABLET, FILM COATED ORAL at 17:09

## 2022-03-25 RX ADMIN — CEPHALEXIN 500 MG: 500 CAPSULE ORAL at 05:56

## 2022-03-25 RX ADMIN — CEPHALEXIN 500 MG: 500 CAPSULE ORAL at 13:59

## 2022-03-25 RX ADMIN — METOPROLOL SUCCINATE 100 MG: 100 TABLET, EXTENDED RELEASE ORAL at 10:54

## 2022-03-25 RX ADMIN — HYDRALAZINE HYDROCHLORIDE 50 MG: 50 TABLET, FILM COATED ORAL at 05:56

## 2022-03-25 RX ADMIN — DOCUSATE SODIUM 100 MG: 100 CAPSULE, LIQUID FILLED ORAL at 10:55

## 2022-03-25 RX ADMIN — AMLODIPINE BESYLATE 5 MG: 5 TABLET ORAL at 10:54

## 2022-03-25 RX ADMIN — FLECAINIDE ACETATE 100 MG: 100 TABLET ORAL at 17:09

## 2022-03-25 RX ADMIN — WARFARIN SODIUM 2.5 MG: 2.5 TABLET ORAL at 17:09

## 2022-03-25 RX ADMIN — DULOXETINE HYDROCHLORIDE 60 MG: 60 CAPSULE, DELAYED RELEASE ORAL at 21:19

## 2022-03-25 RX ADMIN — ACETAMINOPHEN 975 MG: 325 TABLET ORAL at 05:56

## 2022-03-25 RX ADMIN — STANDARDIZED SENNA CONCENTRATE 8.6 MG: 8.6 TABLET ORAL at 11:00

## 2022-03-25 RX ADMIN — GABAPENTIN 300 MG: 300 CAPSULE ORAL at 21:19

## 2022-03-25 RX ADMIN — DOCUSATE SODIUM 100 MG: 100 CAPSULE, LIQUID FILLED ORAL at 17:09

## 2022-03-25 RX ADMIN — PANTOPRAZOLE SODIUM 40 MG: 40 TABLET, DELAYED RELEASE ORAL at 05:56

## 2022-03-25 NOTE — PROGRESS NOTES
[address] [facility]  Progress Note - Orma Shown 1950, 71 y o  male MRN: [id number]  Unit/Bed#: -01 Encounter: [id number]  Primary Care Provider: Aminah Cantrell MD   Date and time admitted to hospital: 3/18/2022  5:49 PM    * Cervical myelopathy [facility])  Assessment & Plan  POD14 Anterior cervical discectomy and fixation fusion C5/6 and C6/7; Posterior cervical decompression and instrumented fusion C3-T1 3/11/22 by Dr Marya Avila  · Pre op- severe canal stenosis C5-6 with small area of signal abnormality   Presented with significant decline in ambulation along with numbness and weakness R>L over the last week  Reports multiple falls  Imaging:    Cervical spine x-ray 03/11/2022:ACDF C5-C7 and posterior fusion hardware from C3 through T1 as above  Plan:   Continue monitor neurological exam and symptoms   [place] collar to remain in place at all times, okay for [place] collar for showering  o Given patient's history of factor five Leiden, patient has now been bridged from a heparin drip to Warfarin   Medical management and pain control per primary team  o Continue Keflex 500 QID x 1 week  · Mobilize with PT/OT  · Encouraged incentive spirometer  · DVT PPX: SCDs and Warfarin      Neurosurgery will sign off  Please call with questions in the interim  Plan for outpatient follow-up for one week postoperative visit for an incision check  Plan of care discussed with Dr Priscilla Curtis  Subjective/Objective     Patient seen sitting upright in his bed  Doing veery well in his recovery  The patient reports that his pain is well controlled  He endorses numbness at the site of his incision  He has been compliantly wearing his [place] collar  I/O       03/23 0701  03/24 0700 03/24 0701  03/25 0700 03/25 0701 03/26 0700    P  O  300 360     Total Intake(mL/kg) 300 (2 9) 360 (3 5)     Urine (mL/kg/hr) 750 (0 3) 750 (0 3) 400 (0 5)    Stool 0 0 0    Total Output 750 750 400 Net -450 -390 -400           Unmeasured Stool Occurrence 1 x 1 x 1 x          Invasive Devices  Report    None                 Physical Exam:  Vitals: Blood pressure 143/70, pulse 78, temperature 98 7 °F (37 1 °C), temperature source Oral, resp  rate 16, height 5' 11" (1 803 m), weight 104 kg (230 lb 6 1 oz), SpO2 94 %  ,Body mass index is 32 13 kg/m²  General appearance: alert, appears stated age, cooperative and no distress  Head: Normocephalic, without obvious abnormality, atraumatic  Eyes: EOMI, PERRL  Neck: supple, symmetrical, trachea midline and NT  Back: no kyphosis present, no tenderness to percussion or palpation  Lungs: non labored breathing  Heart: regular heart rate  Neurologic:   Mental status: Alert, awake, oriented x 3, thought content appropriate  Cranial nerves: grossly intact (Cranial nerves II-XII)  Sensory: normal to LT x 4  Motor: moving all extremities without focal weakness   Reflexes: 2+ and symmetric  Coordination: finger to nose normal bilaterally, no drift bilaterally  Incision: some bleeding drainage noted but controlled with the applied gauze and ABD pad with tape  No opening or dehiscence noted  Some fibrinous tissue noted at the base of his incision         Lab Results:  Results from last 7 days   Lab Units 03/24/22  0706 03/21/22  0614 03/19/22  0529   WBC Thousand/uL 8 61 9 22 11 86*   HEMOGLOBIN g/dL 11 0* 12 1 12 3   HEMATOCRIT % 30 8* 33 7* 37 3   PLATELETS Thousands/uL 266 245 222   NEUTROS PCT % 70 72 72   MONOS PCT % 8 9 9     Results from last 7 days   Lab Units 03/24/22  0706 03/21/22  0614   POTASSIUM mmol/L 3 6 3 7   CHLORIDE mmol/L 99* 97*   CO2 mmol/L 30 27   BUN mg/dL 14 13   CREATININE mg/dL 0 65 0 60   CALCIUM mg/dL 9 3 9 3             Results from last 7 days   Lab Units 03/24/22  0706 03/22/22  0603 03/21/22  0613 03/20/22  0937 03/20/22  0937 03/20/22  0239 03/19/22  1859 03/19/22  0529   INR  2 64* 2 68* 2 87*   < > 2 31*  --   --    < >   PTT seconds  --   -- --   --  30 116* 105*  --     < > = values in this interval not displayed  No results found for: TROPONINT  ABG:No results found for: PHART, GJK9SUR, PO2ART, WTS1YKG, T9IQQIRU, BEART, SOURCE    Imaging Studies: I have personally reviewed pertinent reports  and I have personally reviewed pertinent films in PACS    No results found  EKG, Pathology, and Other Studies: I have personally reviewed pertinent reports        VTE Pharmacologic Prophylaxis: Sequential compression device (Venodyne)     VTE Mechanical Prophylaxis: sequential compression device

## 2022-03-25 NOTE — WOUND OSTOMY CARE
Consult Note - Wound   Iva Suarez 71 y o  male MRN: [id number]  Unit/Bed#: [facility] 451-01 Encounter: [id number]        History and Present Illness: Patient seen for wound care consult today for the posterior lower aspect of the neck incisional area   Assessment Findings:   1  Distal portion of the stapled incisional line with moderate amount of bloody drainage noted on the dressing   When palpated no pain or additional drainage expressed   Discussed the plan of care and dressing to be applied by the RN after therapy session completed   Plan:   1  Posterior lower neck incisional area - cleanse with Normal saline then apply maxorb then top with a allevyn foam change bid   Wounds:      Wound 03/25/22 Neck Posterior (Active)   Wound Image   03/25/22 1331   Wound Description Beefy red;Bleeding 03/25/22 1331   Hailee-wound Assessment Edema; Erythema 03/25/22 1331   Wound Length (cm) 3 cm 03/25/22 1331   Wound Width (cm) 1 cm 03/25/22 1331   Wound Depth (cm) 0 1 cm 03/25/22 1331   Wound Surface Area (cm^2) 3 cm^2 03/25/22 1331   Wound Volume (cm^3) 0 3 cm^3 03/25/22 1331   Calculated Wound Volume (cm^3) 0 3 cm^3 03/25/22 1331   Drainage Amount Moderate 03/25/22 1331   Drainage Description Bloody 03/25/22 1331   Treatments Cleansed;Site care 03/25/22 1331   Dressing Calcium Alginate; Foam, Silicon (eg   Allevyn, etc) 03/25/22 1331   Dressing Changed Other (Comment) 03/25/22 1331     Wound care will follow weekly call or tiger text with questions     Shahab Stark RN BSN CWOCN

## 2022-03-25 NOTE — PROGRESS NOTES
Physical Medicine and Rehabilitation Progress Note  [place] Shape 71 y o  male MRN: [id number]  Unit/Bed#: LANDON 451-01 Encounter: [id number]      Assessment & Plan:     Functional assessment:  Improving        Admit    Recent  Total assist UBD    Significant assist  UBD   Mod assist  To hygiene, bathing, LBD    Min assist      Contact Guard  To hygiene, Bathing, LBD   Supervision      Mod Indep/Indep     Transfers Mod assist  Min assist-CGA   Ambulation  10 ft significant assist  150 ft+ CGA assist    Stairs  Total assist/NT      Goal: Mod indep with transfers and ambulation;  Some mild assist for dressing, bathing  Major barriers:  Incoordination, imbalance, deconditioning   Dispo & ELOS: Home with ELOS Next Tuesday    * Cervical myelopathy [facility])  Assessment & Plan  Function improving well with plan for d/c Tuesday  - Neuro exam stable 3/25 - POD14  - Patient seen by Nsx 3/24 - Dr Marybeth Wilks and Dr Edward Haddad   - No need for additional imaging at this time   - Dr Marybeth Wilks felt Abx not required initially but upon discussion with [facility]-Walker 3/25 who spoke to Dr Edward Haddad agree with plan to continue Keflex 500mg Q6H x7 days with follow-up in their office as OP    - They recommend continuing staples longer but max 21 days - Friday Apr 1     Cervical myelopathy S/P anterior cervical discectomy and fixation fusion C5/6 and C6/7; Posterior cervical decompression and instrumented fusion C3-T1 by Dr Edward Haddad on 3/11/22     - 3/25 - still with sanguinous drainage; ongoing stable erythema; stable mild edema; no increased TTP   - Continue Keflex 500mg Q6H    - Patient afebrile, CBC without leukocytosis   - Dressing changes daily and PRN > monitoring closely   - IM recommends wound care c/s to see if there is a more absorbant dressing/recommendation to further wick away drainage to prevent/decrease maceration   - Aspen cervical collar on at all times except can use [employer] for showers  - Activity Restrictions: No heavy lifting greater than 5 - 10lbs  No strenuous activities  No driving while requiring cervical collar, anticipated six weeks  No significant neck movement  - May shower 3 days after surgery, but do not soak in a tub and no swimming, use mild antimicrobial soap and water  Pat incision dry after showering   - Monitor for neuro changes, dysphagia, neurogenic bowel/bladder, spasticity  - Continue acute comprehensive interdisciplinary inpatient rehabilitation to include intensive skilled therapies (PT, OT) as outlined with oversight and management by rehabilitation physician as well as inpatient rehab level nursing, case management and weekly interdisciplinary team meetings  - Optimal pain management  - Follow-up with Spine Sx and if needed PMR after d/c         Head pain cephalgia  Assessment & Plan  Reporting some burning type pain largely on his posterior scalp/post auricular L>R -  particularly when he lays down - worse at night - sub-optimal control of pain last evening   - Per NSx " Noticed a few burning twinges in left high para sagittal low suboccipital neck region; Likely from stretching of parasagittal muscles andor medial fibers supraspinatus muscle"  - Scalp palpated again and unremarkable - no edema, increased warmth, erythema, or TTP > stable again today   - Cervical incision stable - see above for additional mgmt and NSx follow-up > continue to monitor   - Neuro exam stable > continue to monitor   - Monitor closely - ?occipital neuralgia? other   - Adjust Gabapentin to 100mg qday and 300mg HS     Urinary dysfunction  Assessment & Plan  Improving - urinating adequately recenlty   Hx of BPH on chronic flomax  - Home flomax resumed 3/21   - Monitor closely   - He reported urinary incontinence worsening prior to admission that has been improving since sx  - Notes some still impaired sensation   - voiding adequately with fairly low PVRS - hold scans   - Monitor for infection     Pain  Assessment & Plan  Sub-optimal control of neuropathic scalp pain at night   - APAP TID  - PRN oxy 2 5-5mg Q4H PRN  - Robaxin 500mg change to Q6H PRN for spasms  - Gabapentin 100mg qday and 300mg HS   - PRN LD cream for needlesticks         Hyponatremia  Assessment & Plan  Improving 3/24    Sinus bradycardia  Assessment & Plan  IM consulted and with overall management at their discretion during ARC course  Card OP follow-up     Paroxysmal [facility]-fib ([facility] )  Assessment & Plan  IM consulted and with overall management at their discretion during ARC course  Rate control: MTP, flecainide  Antithrombotic:Warfarin     Depression, major, single episode, moderate (HCC)  Assessment & Plan  Cymbalta  Supportive counseling     Factor V Leiden mutation ([facility] )  Assessment & Plan  Warfarin per IM     WILL (obstructive sleep apnea)  Assessment & Plan  CPAP  - If patient unable to use CPAP - spot check O2 while sleeping and provide 2 NC with goal SpO2>91% - titrate as needed  - Consider noct ox    Essential hypertension  Assessment & Plan  Internal medicine consulted and management at their discretion  Monitor vitals with and without activity; monitor for orthostasis  Monitor hemoglobin, electrolytes, kidney function, hydration status   Current meds: Amlodipine, MTP      Dyslipidemia  Assessment & Plan  Statin       Other Medical Issues:   None       Follow-up providers and other issues to be followed up after discharge  · PCP  · Neurosx  · Cards     CODE: Level 3: DNAR and DNI per discussion with pt/family     Restrictions include: Fall precautions    Objective:     Allergies per EMR  Diagnostic Studies: Reviewed, no new imaging  No orders to display     See above as well    Laboratory: Labs reviewed  Results from last 7 days   Lab Units 03/24/22  0706 03/21/22  0614 03/19/22  0529   HEMOGLOBIN g/dL 11 0* 12 1 12 3   HEMATOCRIT % 30 8* 33 7* 37 3   WBC Thousand/uL 8 61 9 22 11 86*     Results from last 7 days   Lab Units 03/24/22  0706 03/21/22  0614 03/18/22  1148 BUN mg/dL 14 13 16   SODIUM mmol/L 134* 132* 130*   POTASSIUM mmol/L 3 6 3 7 3 7   CHLORIDE mmol/L 99* 97* 100   CREATININE mg/dL 0 65 0 60 0 60     Results from last 7 days   Lab Units 03/24/22  0706 03/22/22  0603 03/21/22  0613   PROTIME seconds 26 9* 27 2* 28 6*   INR  2 64* 2 68* 2 87*        Drug regimen reviewed, all potential adverse effects identified and addressed:    Scheduled Meds:  Current Facility-Administered Medications   Medication Dose Route Frequency Provider Last Rate    acetaminophen  975 mg Oral [facility] Yanni Rose MD      amLODIPine  5 mg Oral BID Yanni Rose MD      atorvastatin  40 mg Oral QPM Yanni Rose MD      bisacodyl  10 mg Rectal Daily PRN Yanni Rose MD      calcium carbonate  1,000 mg Oral Daily PRN Yanni Rose MD      cephalexin  500 mg Oral Q6H [address] Yanni Rose MD      Diclofenac Sodium  2 g Topical 4x Daily PRN Yanni Rose MD      docusate sodium  100 mg Oral BID Yanni Rose MD      DULoxetine  60 mg Oral HS Yanni Rose MD      flecainide  100 mg Oral BID Yanni Rose MD      gabapentin  100 mg Oral Daily Yanni Rose MD      gabapentin  300 mg Oral HS Yanni Rose MD      hydrALAZINE  50 mg Oral [facility] DEEPTHI Ann      lidocaine   Topical 4x Daily PRN Yanni Rose MD      methocarbamol  500 mg Oral Q6H PRN Yanni Rose MD      metoprolol succinate  100 mg Oral Daily Yanni Rose MD      oxyCODONE  2 5 mg Oral Q4H PRN Yanni Rose MD      oxyCODONE  5 mg Oral Q4H PRN Yanni Rose MD      pantoprazole  40 mg Oral Early Morning Yanni Rose MD      polyethylene glycol  17 g Oral Daily PRN Yanni Rose MD      senna  1 tablet Oral Daily Yanni Rose MD      tamsulosin  0 4 mg Oral Daily With Anahy Worrell MD      warfarin  2 5 mg Oral Once per day on Sun Mon Tue Thu Fri Sat DEEPTHI Ann      [START ON 3/30/2022] warfarin  5 mg Oral Once per day on DEEPTHI Salazar         Chief Complaints:  Burning head pain overnight    Subjective:     Patient reports fairly significant burning scalp and posterior head pain more so behind L >R ear  He feels some now but better controlled  He denies other increased pain including increased neck pain, arm pain, worsening strength sensation, SOB, or other new complaints  ROS: A 10 point ROS was performed; negative except as noted above  Physical Exam:  Vitals:    03/25/22 1049   BP: 118/63   Pulse: 78   Resp:    Temp:    SpO2:        GEN:  Sitting in NAD   HEENT/NECK: C collar initially; sanguinous drainage; evolving ecchymosis; stable erythema particularly lower incisional region; slight maceration inferior incisional line; no pus; no increased TTP or pus; periauricular, occipital and post scalp without increased edema, TTP, or warmth  CARDIAC: Regular rate rhythm, no murmers, no rubs, no gallops  LUNGS:  clear to auscultation, no wheezes, rales, or rhonchi  ABDOMEN: Soft, non-tender, non-distended, normal active bowel sounds  EXTREMITIES/SKIN:  no calf edema, no calf tenderness to palpation  NEURO:   MENTAL STATUS:  Appropriate wakefulness and interaction  and Strength/MMT:  5/5 throughout  PSYCH:  Affect:  Euthymic     HPI:  Karine Wei is a 71 y o  male with PMH of Afib, HTN, bradycardia, bilateral knee OA, depression, Factor V Leiden, on chronic A/C who developed progressive gait imbalance followed by more acute R sided weakness/sensory changes and imbalance found to have significant significant cervical spinal cord compression from degenerative spondylosis most consistent with cervical myelopathy  Patient underwent anterior cervical discectomy and fixation fusion C5/6 and C6/7; Posterior cervical decompression and instrumented fusion C3-T1 by Dr Salome Gibbs on 3/11/22    Post-op course notable for significant decline in ADLs and mobility and he appears to be appropriate for admission to [facility] at this time        ** Please Note: Fluency Direct voice to text software may have been used in the creation of this document   **

## 2022-03-25 NOTE — CASE MANAGEMENT
Cm scheduled pt's outpt PT at [facility] for April 4 at 12:15 pm  Following to assist w/ d/c planning needs

## 2022-03-25 NOTE — PROGRESS NOTES
03/25/22 0900   Pain Assessment   Pain Assessment Tool 0-10   Pain Score 3   Pain Location/Orientation Location: Incision; Location: Neck;Orientation: Mid;Location: Head   Pain Onset/Description Onset: Gradual;Descriptor: Burning   Restrictions/Precautions   Precautions Spinal precautions; Fall Risk;Pain   Weight Bearing Restrictions No   ROM Restrictions Yes  (cervical spinal precautions)   Braces or Orthoses C/S Collar  (c-collar donned AAT)   Upper Body Dressing   Type of Assistance Needed Supervision   Physical Assistance Level No physical assistance   Comment S while seated EOB to doff/don OH shirt; G carryover of cervical spinal precautions  Participated in dressing change completed by RN by holding c-collar in place  Pads changed in c-collar due to noted drainage  RN in pt room to assess incision and documented pictures of same   Upper Body Dressing CARE Score 4   Sit to Lying   Type of Assistance Needed Supervision   Physical Assistance Level No physical assistance   Comment HOB slightly elevated; Min VCs to limit twisting when transferring to EOB   Sit to Lying CARE Score 4   Lying to Sitting on Side of Bed   Type of Assistance Needed Supervision   Physical Assistance Level No physical assistance   Comment HOB flat; pt noted to raise HOB prior to transfer; verbally reviewed log rolling method as pt w/o bedrails or adjustable bed at home; verbalized understaning   Lying to Sitting on Side of Bed CARE Score 4   Sit to Stand   Type of Assistance Needed Incidental touching   Physical Assistance Level No physical assistance   Comment CS/CGA   Sit to Stand CARE Score 4   Bed-Chair Transfer   Type of Assistance Needed Incidental touching   Physical Assistance Level No physical assistance   Comment trailed short distance functional mob  to bathroom w/o AD; required CGA w/ occasional Min A due to LE weakness    CS/CGA when using RW   Chair/Bed-to-Chair Transfer CARE Score 4   Therapeutic Excerise-Strength   UE Strength Yes   Right Upper Extremity- Strength   RUE Strength Comment Completed UE strengthening while complying w/ cervical spinal precautions (no OH movement or >5lbs)  Focus on increasing strength and endurance for increased independence w/ I/ADL tasks and functional transfers  Pt completed therex using 4# dumbbell 2 x 15: bicep curl, modified chest press, horizontal ab/adduction  Min cues for proper body mechanics to ensure targeted muscle group  Pt tolerated fairly well w/ therapeutic rest breaks provided to manage fatigue and mild complaints of pain  W/ exercises on RUE, pt had increased complaints of L sided head pain, behind his L ear - described as burning aching sensation  MD/RN aware of complaints; did not impact participation as discomfort did subside following ~3 minute rest break  Left Upper Extremity-Strength   LUE Strength Comment See above   Cognition   Overall Cognitive Status [facility]   Arousal/Participation Alert; Cooperative   Attention Within functional limits   Orientation Level Oriented X4   Memory Decreased short term memory   Following Commands Follows multistep commands without difficulty   Additional Activities   Additional Activities Other (Comment)   Additional Activities Comments Pt completed functional mob  To <> from room/therapy gym using RW w/ CGA  Provided w/ verbal education regarding fall prevention and safe management of AD  Demonstrated appropriate proximity of AD during IADL tasks such as kitchen mob  Pt reported he typically will clean up from meals and wash the dishes  Provided w/ demonstration/verbal education regarding proper body mechanics due to cervical spine precautions  Pt verbalized G understanding; will continued to assess carryover and application       Activity Tolerance   Activity Tolerance Patient tolerated treatment well   Other Comments   Assessment Pt provided w/ further verbal education referencing handout from OT toolkit regarding body mechanics focusing on posture, continued edu regarding cervical spinal precautions and guidelines for same, and I/ADL activities following cervical surgery focusing on improved insight and carryover of safety precautions as well as reference sheet for family  Pt reported "my wife is going to study those and make sure I'm doing what I'm suppose to do"  Pt able to verbally recall 3/3 c-spine precautions at start of session and demo fair carryover into functional tasks; required Min VCs throughout session to limit twisting  Assessment   Treatment Assessment Pt participated in 90 minute skilled OT session focusing on ADL re-training, functional transfers/mob , functional standing tolerance/endurance, UE strengthening/endurance and continued education regarding compensatory techniques  Details regarding tx session noted above - refer for details  Pt w/ complaints of "burning" sensation on top of head (favored L side) and behind his L ear during lite UB therex  Pt did report same discomfort last night which MD and RN are aware of  Did resolve following a few minutes and did not impact functional participation  Pt continues to make functional gains; trialed short distance functional mob  To bathroom w/o AD; demo fair ability requiring intermittent Min A for steadying due to LE weakness and incoordination  Pt continues to be limited by generalized weakness, decreased endurance, decreased dynamic standing balance/tolerance, and intermittent nerve pain  Heavily educated on the importance of maintaining cervical spinal precautions w/ functional activity and improving insight w/ same to maximize recovery and overall safety  Pt demo G insight regarding same; will continue to benefit from compensatory education  Continue POC w/ focus on kitchen mob , endurance training, balance training, and carryover of compensatory methods w/ I/ADLs    Will continue skilled OT services following current POC at this time w/ focus on above mentioned deficits to increased independence and safety w/ I/ADL tasks and functional transfers  Prognosis Good   Problem List Decreased strength;Decreased range of motion;Decreased mobility; Decreased coordination; Impaired balance;Pain;Orthopedic restrictions;Decreased endurance   Plan   Treatment/Interventions ADL retraining;Functional transfer training; Therapeutic exercise; Endurance training;Patient/family training;Equipment eval/education; Bed mobility; Compensatory technique education   Progress Progressing toward goals   Recommendation   OT Discharge Recommendation   (per OTR no OT needs at d/c)   Equipment Recommended Other (comment)  (TBD pending progress)   OT Therapy Minutes   OT Time In 0900   OT Time Out 1030   OT Total Time (minutes) 90   OT Mode of treatment - Individual (minutes) 90   OT Mode of treatment - Concurrent (minutes) 0   OT Mode of treatment - Group (minutes) 0   OT Mode of treatment - Co-treat (minutes) 0   OT Mode of Treatment - Total time(minutes) 90 minutes   OT Cumulative Minutes 648   Therapy Time missed   Time missed?  No

## 2022-03-25 NOTE — PROGRESS NOTES
Physical Medicine and Rehabilitation Progress Note  Alvaro Rai 71 y o  male MRN: [id number]  Unit/Bed#: -01 Encounter: [id number]      Assessment & Plan:     Functional assessment:  Improving        Admit    Recent  Total assist UBD    Significant assist  UBD   Mod assist  To hygiene, bathing, LBD    Min assist   Bathing    Contact Guard  LBD, To hygiene    Supervision      Mod Indep/Indep     Transfers Mod assist  Min assist-supervision    Ambulation  10 ft significant assist  150 ft significant assist    Stairs  Total assist/NT      Goal: Mod indep with transfers and ambulation; Some mild assist for dressing, bathing  Major barriers:  Incoordination, imbalance, deconditioning   Dispo & ELOS: Home with ELOS Next Tuesday    * Cervical myelopathy [facility])  Assessment & Plan  Function improving adequately   - Neuro exam stable 3/23    Cervical myelopathy S/P anterior cervical discectomy and fixation fusion C5/6 and C6/7; Posterior cervical decompression and instrumented fusion C3-T1 by Dr Jose Francisco Wells on 3/11/22  - Post-op Day 14 is Fri 3/25  - 3/23 - some bloody drainage still - trace on my eval; discussed with IM and still no clear evidence of infection > continue close monitoring > will be seen for 2 week follow-up again by NSx Friday - if needed will follow-up sooner  - Patient afebrile, CBC tomorrow am    - Dressing changes daily and PRN   - [place]en cervical collar on at all times except can use [place] for showers  - Activity Restrictions: No heavy lifting greater than 5 - 10lbs  No strenuous activities  No driving while requiring cervical collar, anticipated six weeks  No significant neck movement  - May shower 3 days after surgery, but do not soak in a tub and no swimming, use mild antimicrobial soap and water   Pat incision dry after showering   - Monitor for neuro changes, dysphagia, neurogenic bowel/bladder, spasticity  - Continue acute comprehensive interdisciplinary inpatient rehabilitation to include intensive skilled therapies (PT, OT) as outlined with oversight and management by rehabilitation physician as well as inpatient rehab level nursing, case management and weekly interdisciplinary team meetings  - Optimal pain management  - Follow-up with Spine Sx and if needed PMR after d/c         Head pain cephalgia  Assessment & Plan  Reporting some burning type pain largely on his posterior scalp particularly when he lays down at night last several night  - Scalp palpated again and unremarkable - no edema, increased warmth, erythema, or TTP  - Cervical incision stable - see above > continue to monitor   - Neuro exam stable > continue to monitor   - Monitor closely - ?occipital neuralgia? other   - Gabapentin 100mg HS > helpful > continue     Urinary dysfunction  Assessment & Plan  Improving - urinating adequately recenlty   Hx of BPH on chronic flomax  - Home flomax resumed 3/21   - Monitor closely   - He reported urinary incontinence worsening prior to admission that has been improving since sx  - Notes some still impaired sensation   - voiding adequately with fairly low PVRS - hold scans   - Monitor for infection     Pain  Assessment & Plan  Well controlled overall but with some posterior scalp neuropathic-like pain > better controlled   - APAP TID  - PRN oxy 2 5-5mg Q4H PRN  - Robaxin 500mg change to Q6H PRN for spasms  - Gabapentin 100mg HS   - PRN LD cream for needlesticks         Hyponatremia  Assessment & Plan  BMP tomorrow   Improving 3/21    Sinus bradycardia  Assessment & Plan  IM consulted and with overall management at their discretion during ARC course  Card OP follow-up     [facility] )  Assessment & Plan  IM consulted and with overall management at their discretion during ARC course  Rate control: MTP, flecainide  Antithrombotic:Warfarin     Depression, major, single episode, moderate (HCC)  Assessment & Plan  Cymbalta  Supportive counseling     Factor V Leiden mutation ([facility] )  Assessment & Plan  Warfarin per IM     WILL (obstructive sleep apnea)  Assessment & Plan  CPAP  - If patient unable to use CPAP - spot check O2 while sleeping and provide 2 NC with goal SpO2>91% - titrate as needed  - Consider noct ox    Essential hypertension  Assessment & Plan  Internal medicine consulted and management at their discretion  Monitor vitals with and without activity; monitor for orthostasis  Monitor hemoglobin, electrolytes, kidney function, hydration status   Current meds: Amlodipine, MTP      Dyslipidemia  Assessment & Plan  Statin       Other Medical Issues:   None       Follow-up providers and other issues to be followed up after discharge  · PCP  · Neurosx  · Cards     CODE: Level 3: DNAR and DNI per discussion with pt/family     Restrictions include: Fall precautions    Objective:     Allergies per EMR  Diagnostic Studies: Reviewed, no new imaging  No orders to display     See above as well    Laboratory: Labs reviewed  Results from last 7 days   Lab Units 03/24/22  0706 03/21/22  0614 03/19/22  0529   HEMOGLOBIN g/dL 11 0* 12 1 12 3   HEMATOCRIT % 30 8* 33 7* 37 3   WBC Thousand/uL 8 61 9 22 11 86*     Results from last 7 days   Lab Units 03/24/22  0706 03/21/22  0614 03/18/22  1148   BUN mg/dL 14 13 16   SODIUM mmol/L 134* 132* 130*   POTASSIUM mmol/L 3 6 3 7 3 7   CHLORIDE mmol/L 99* 97* 100   CREATININE mg/dL 0 65 0 60 0 60     Results from last 7 days   Lab Units 03/24/22  0706 03/22/22  0603 03/21/22  0613   PROTIME seconds 26 9* 27 2* 28 6*   INR  2 64* 2 68* 2 87*        Drug regimen reviewed, all potential adverse effects identified and addressed:    Scheduled Meds:  Current Facility-Administered Medications   Medication Dose Route Frequency Provider Last Rate    acetaminophen  975 mg Oral [facility] Joelle Davies MD      amLODIPine  5 mg Oral BID Joelle Davies MD      atorvastatin  40 mg Oral QPM Joelle Davies MD      bisacodyl  10 mg Rectal Daily PRN Joelle Davies MD      calcium carbonate  1,000 mg Oral Daily PRN Dorma Backers, MD      cephalexin  500 mg Oral [facility] Dorma Backers, MD      Diclofenac Sodium  2 g Topical 4x Daily PRN Dorma Backers, MD      docusate sodium  100 mg Oral BID Dorma Backers, MD      DULoxetine  60 mg Oral HS Dorma Backers, MD      flecainide  100 mg Oral BID Dorma Backers, MD      gabapentin  100 mg Oral HS Dorma Backers, MD      hydrALAZINE  50 mg Oral [facility] DEEPTHI Hu      lidocaine   Topical 4x Daily PRN Dorma Backers, MD      methocarbamol  500 mg Oral Q6H PRN Dorma Backers, MD      metoprolol succinate  100 mg Oral Daily Dorma Backers, MD      oxyCODONE  2 5 mg Oral Q4H PRN Dorma Backers, MD      oxyCODONE  5 mg Oral Q4H PRN Dorma Backers, MD      pantoprazole  40 mg Oral Early Morning Dorma Backers, MD      polyethylene glycol  17 g Oral Daily PRN Dorma Backers, MD      senna  1 tablet Oral Daily Dorma Backers, MD      tamsulosin  0 4 mg Oral Daily With Tarun Olguin MD      warfarin  2 5 mg Oral Once per day on Sun Mon Tue Thu Fri Henna Hsieh PA-C      warfarin  5 mg Oral Once per day on Wed Sat Mikaela Jackson PA-C         Chief Complaints:  Rehab follow-up    Subjective:     Patient reports sleeping better with gabapentin last evening  He notes still some intermittent burning like pain at back of head more when laying down but somewhat better  Patient denies increased neck pain, pain radiating down arms, worsening strength, sensation, balance, fever, chills, sweats, or other new complaints  ROS: A 10 point ROS was performed; negative except as noted above         Physical Exam:  03/23/22 0612 98 °F (36 7 °C) 72 18 136/72 -- 97 % None (Room air)     Vitals above reviewed on date of encounter    GEN:  Sitting in NAD   HEENT/NECK: C collar; incision with trace sangious drainage, some evolving ecchmyosis along incision; edema mostly inferior region fairly stable; no solitario pus, increased warmth or TTP; posterior head/occiput region without increased warmth, erythema, TTP, or edema  CARDIAC: Regular rate rhythm, no murmers, no rubs, no gallops  LUNGS:  clear to auscultation, no wheezes, rales, or rhonchi  ABDOMEN: Soft, non-tender, non-distended, normal active bowel sounds  EXTREMITIES/SKIN:  no calf edema, no calf tenderness to palpation  NEURO:   MENTAL STATUS:  Appropriate wakefulness and interaction  and Strength/MMT:  5/5 throughout; FTN intact  PSYCH:  Affect:  Euthymic     HPI:  Gilberto Jennings is a 71 y o  male with PMH of Afib, HTN, bradycardia, bilateral knee OA, depression, Factor V Leiden, on chronic A/C who developed progressive gait imbalance followed by more acute R sided weakness/sensory changes and imbalance found to have significant significant cervical spinal cord compression from degenerative spondylosis most consistent with cervical myelopathy  Patient underwent anterior cervical discectomy and fixation fusion C5/6 and C6/7; Posterior cervical decompression and instrumented fusion C3-T1 by Dr Mary Myers on 3/11/22  Post-op course notable for significant decline in ADLs and mobility and he appears to be appropriate for admission to [facility] at this time        ** Please Note: Fluency Direct voice to text software may have been used in the creation of this document  **    I personally performed the required components and examined the patient myself in person on 3/23/25

## 2022-03-25 NOTE — ASSESSMENT & PLAN NOTE
POD14 Anterior cervical discectomy and fixation fusion C5/6 and C6/7; Posterior cervical decompression and instrumented fusion C3-T1 3/11/22 by Dr Ace Felton  · Pre op- severe canal stenosis C5-6 with small area of signal abnormality   Presented with significant decline in ambulation along with numbness and weakness R>L over the last week  Reports multiple falls  Imaging:    Cervical spine x-ray 03/11/2022:ACDF C5-C7 and posterior fusion hardware from C3 through T1 as above  Plan:   Continue monitor neurological exam and symptoms   [place] collar to remain in place at all times, okay for [place] collar for showering  o Given patient's history of factor five Leiden, patient has now been bridged from a heparin drip to Warfarin   Medical management and pain control per primary team  o Continue Keflex 500 QID x 1 week  · Mobilize with PT/OT  · Encouraged incentive spirometer  · DVT PPX: SCDs and heparin gtt      Neurosurgery will sign off  Please call with questions in the interim  Plan for outpatient follow-up for one week postoperative visit for an incision check  Plan of care discussed with Dr Lauren Chu

## 2022-03-25 NOTE — DISCHARGE INSTR - OTHER ORDERS
Plan:   1  Posterior lower neck incisional area - cleanse with Normal saline (or gentle soap and water), dry thoroughly, then apply maxorb then top with a allevyn foam change twice daily and as needed if significantly soiled

## 2022-03-25 NOTE — PLAN OF CARE
Problem: PAIN - ADULT  Goal: Verbalizes/displays adequate comfort level or baseline comfort level  Description: Interventions:  - Encourage patient to monitor pain and request assistance  - Assess pain using appropriate pain scale  - Administer analgesics based on type and severity of pain and evaluate response  - Implement non-pharmacological measures as appropriate and evaluate response  - Consider cultural and social influences on pain and pain management  - Notify physician/advanced practitioner if interventions unsuccessful or patient reports new pain  Outcome: Progressing     Problem: INFECTION - ADULT  Goal: Absence or prevention of progression during hospitalization  Description: INTERVENTIONS:  - Assess and monitor for signs and symptoms of infection  - Monitor lab/diagnostic results  - Monitor all insertion sites, i e  indwelling lines, tubes, and drains  - Monitor endotracheal if appropriate and nasal secretions for changes in amount and color  - [place] appropriate cooling/warming therapies per order  - Administer medications as ordered  - Instruct and encourage patient and family to use good hand hygiene technique  - Identify and instruct in appropriate isolation precautions for identified infection/condition  Outcome: Progressing  Goal: Absence of fever/infection during neutropenic period  Description: INTERVENTIONS:  - Monitor WBC    Outcome: Progressing     Problem: SAFETY ADULT  Goal: Patient will remain free of falls  Description: INTERVENTIONS:  - Educate patient/family on patient safety including physical limitations  - Instruct patient to call for assistance with activity   - Consult OT/PT to assist with strengthening/mobility   - Keep Call bell within reach  - Keep bed low and locked with side rails adjusted as appropriate  - Keep care items and personal belongings within reach  - Initiate and maintain comfort rounds  - Make Fall Risk Sign visible to staff  - Offer Toileting every  Hours, in advance of need  - Initiate/Maintain alarm  - Obtain necessary fall risk management equipment:   - Apply yellow socks and bracelet for high fall risk patients  - Consider moving patient to room near nurses station  Outcome: Progressing  Goal: Maintain or return to baseline ADL function  Description: INTERVENTIONS:  -  Assess patient's ability to carry out ADLs; assess patient's baseline for ADL function and identify physical deficits which impact ability to perform ADLs (bathing, care of mouth/teeth, toileting, grooming, dressing, etc )  - Assess/evaluate cause of self-care deficits   - Assess range of motion  - Assess patient's mobility; develop plan if impaired  - Assess patient's need for assistive devices and provide as appropriate  - Encourage maximum independence but intervene and supervise when necessary  - Involve family in performance of ADLs  - Assess for home care needs following discharge   - Consider OT consult to assist with ADL evaluation and planning for discharge  - Provide patient education as appropriate  Outcome: Progressing  Goal: Maintains/Returns to pre admission functional level  Description: INTERVENTIONS:  - Perform BMAT or MOVE assessment daily    - Set and communicate daily mobility goal to care team and patient/family/caregiver  - Collaborate with rehabilitation services on mobility goals if consulted  - Perform Range of Motion  times a day  - Reposition patient every  hours    - Dangle patient  times a day  - Stand patient  times a day  - Ambulate patient times a day  - Out of bed to chair  times a day   - Out of bed for meals  times a day  - Out of bed for toileting  - Record patient progress and toleration of activity level   Outcome: Progressing     Problem: DISCHARGE PLANNING  Goal: Discharge to home or other facility with appropriate resources  Description: INTERVENTIONS:  - Identify barriers to discharge w/patient and caregiver  - Arrange for needed discharge resources and transportation as appropriate  - Identify discharge learning needs (meds, wound care, etc )  - Arrange for interpretive services to assist at discharge as needed  - Refer to Case Management Department for coordinating discharge planning if the patient needs post-hospital services based on physician/advanced practitioner order or complex needs related to functional status, cognitive ability, or social support system  Outcome: Progressing     Problem: Knowledge Deficit  Goal: Patient/family/caregiver demonstrates understanding of disease process, treatment plan, medications, and discharge instructions  Description: Complete learning assessment and assess knowledge base    Interventions:  - Provide teaching at level of understanding  - Provide teaching via preferred learning methods  Outcome: Progressing     Problem: Potential for Falls  Goal: Patient will remain free of falls  Description: INTERVENTIONS:  - Educate patient/family on patient safety including physical limitations  - Instruct patient to call for assistance with activity   - Consult OT/PT to assist with strengthening/mobility   - Keep Call bell within reach  - Keep bed low and locked with side rails adjusted as appropriate  - Keep care items and personal belongings within reach  - Initiate and maintain comfort rounds  - Make Fall Risk Sign visible to staff  - Offer Toileting every  Hours, in advance of need  - Initiate/Maintain alarm  - Obtain necessary fall risk management equipment:   - Apply yellow socks and bracelet for high fall risk patients  - Consider moving patient to room near nurses station  Outcome: Progressing

## 2022-03-25 NOTE — PROGRESS NOTES
Physical Medicine and Rehabilitation Progress Note  Neville Sherman 71 y o  male MRN: [id number]  Unit/Bed#: -01 Encounter: [id number]      Assessment & Plan:     Functional assessment:  Improving        Admit    Recent  Total assist UBD    Significant assist  UBD   Mod assist  To hygiene, bathing, LBD    Min assist      Contact Guard  To hygiene, Bathing, LBD   Supervision      Mod Indep/Indep     Transfers Mod assist  Min assist-CGA   Ambulation  10 ft significant assist  150 ft+ CGA assist    Stairs  Total assist/NT      Goal: Mod indep with transfers and ambulation; Some mild assist for dressing, bathing  Major barriers:  Incoordination, imbalance, deconditioning   Dispo & ELOS: Home with ELOS Next Tuesday    * Cervical myelopathy [facility])  Assessment & Plan  Function improving well with plan for d/c Tuesday  - Neuro exam stable 3/24    Cervical myelopathy S/P anterior cervical discectomy and fixation fusion C5/6 and C6/7; Posterior cervical decompression and instrumented fusion C3-T1 by Dr Fito Robison on 3/11/22  - Post-op Day 14 is Fri 3/25  - 3/24 - still with slight sanguinous drainage; evolving ecchymosis; some increased erythema and possible mild increased edema; but without solitario pus expelled from site (note: whitish regions near posterior inferior incision are skin pigmentation and not solitario pus at this time); no increased warmth or increased TTP  - Family noted some concern regarding appearance of incision and possible need for abx  - Discussed with IM and NSx - NSx felt reasonable to start on Abx (if not for solitario infection than for prophylaxis) with plan for in-person eval by them today and consideration for additional imaging   - Provide Keflex 500mg Q6H   - Patient afebrile, CBC without leukocytosis   - Dressing changes daily and PRN > monitoring closely   - Aspen cervical collar on at all times except can use [employer] for showers  - Activity Restrictions: No heavy lifting greater than 5 - 10lbs  No strenuous activities  No driving while requiring cervical collar, anticipated six weeks  No significant neck movement  - May shower 3 days after surgery, but do not soak in a tub and no swimming, use mild antimicrobial soap and water  Pat incision dry after showering   - Monitor for neuro changes, dysphagia, neurogenic bowel/bladder, spasticity  - Continue acute comprehensive interdisciplinary inpatient rehabilitation to include intensive skilled therapies (PT, OT) as outlined with oversight and management by rehabilitation physician as well as inpatient rehab level nursing, case management and weekly interdisciplinary team meetings  - Optimal pain management  - Follow-up with Spine Sx and if needed PMR after d/c         Head pain cephalgia  Assessment & Plan  Reporting some burning type pain largely on his posterior scalp particularly when he lays down - stable   - Scalp palpated again and unremarkable - no edema, increased warmth, erythema, or TTP  - Cervical incision stable - see above > continue to monitor   - Neuro exam stable > continue to monitor   - Monitor closely - ?occipital neuralgia? other   - Gabapentin 100mg HS > helpful > continue     Urinary dysfunction  Assessment & Plan  Improving - urinating adequately recenlty   Hx of BPH on chronic flomax  - Home flomax resumed 3/21   - Monitor closely   - He reported urinary incontinence worsening prior to admission that has been improving since sx  - Notes some still impaired sensation   - voiding adequately with fairly low PVRS - hold scans   - Monitor for infection     Pain  Assessment & Plan  Well controlled overall but with some posterior scalp neuropathic-like pain > better controlled   - APAP TID  - PRN oxy 2 5-5mg Q4H PRN  - Robaxin 500mg change to Q6H PRN for spasms  - Gabapentin 100mg HS   - PRN LD cream for needlesticks         Hyponatremia  Assessment & Plan  Improving 3/24    Sinus bradycardia  Assessment & Plan  IM consulted and with overall management at their discretion during ARC course  Card OP follow-up     Paroxysmal A-fib (HCC)  Assessment & Plan  IM consulted and with overall management at their discretion during ARC course  Rate control: MTP, flecainide  Antithrombotic:Warfarin     Depression, major, single episode, moderate (HCC)  Assessment & Plan  Cymbalta  Supportive counseling     Factor V Leiden mutation ([facility] [address] )  Assessment & Plan  Warfarin per IM     WILL (obstructive sleep apnea)  Assessment & Plan  CPAP  - If patient unable to use CPAP - spot check O2 while sleeping and provide 2 NC with goal SpO2>91% - titrate as needed  - Consider noct ox    Essential hypertension  Assessment & Plan  Internal medicine consulted and management at their discretion  Monitor vitals with and without activity; monitor for orthostasis  Monitor hemoglobin, electrolytes, kidney function, hydration status   Current meds: Amlodipine, MTP      Dyslipidemia  Assessment & Plan  Statin       Other Medical Issues:   None       Follow-up providers and other issues to be followed up after discharge  · PCP  · Neurosx  · Cards     CODE: Level 3: DNAR and DNI per discussion with pt/family     Restrictions include: Fall precautions    Objective:     Allergies per EMR  Diagnostic Studies: Reviewed, no new imaging  No orders to display     See above as well    Laboratory: Labs reviewed  Results from last 7 days   Lab Units 03/24/22  0706 03/21/22  0614 03/19/22  0529   HEMOGLOBIN g/dL 11 0* 12 1 12 3   HEMATOCRIT % 30 8* 33 7* 37 3   WBC Thousand/uL 8 61 9 22 11 86*     Results from last 7 days   Lab Units 03/24/22  0706 03/21/22  0614 03/18/22  1148   BUN mg/dL 14 13 16   SODIUM mmol/L 134* 132* 130*   POTASSIUM mmol/L 3 6 3 7 3 7   CHLORIDE mmol/L 99* 97* 100   CREATININE mg/dL 0 65 0 60 0 60     Results from last 7 days   Lab Units 03/24/22  0706 03/22/22  0603 03/21/22  0613   PROTIME seconds 26 9* 27 2* 28 6*   INR  2 64* 2 68* 2 87*        Drug regimen reviewed, all potential adverse effects identified and addressed:    Scheduled Meds:  Current Facility-Administered Medications   Medication Dose Route Frequency Provider Last Rate    acetaminophen  975 mg Oral [facility] Ciro Wooten MD      amLODIPine  5 mg Oral BID Ciro Wooten MD      atorvastatin  40 mg Oral QPM Ciro Wooten MD      bisacodyl  10 mg Rectal Daily PRN Ciro Wooten MD      calcium carbonate  1,000 mg Oral Daily PRN Ciro Wooten MD      cephalexin  500 mg Oral Q6H [address] Ciro Wooten MD      Diclofenac Sodium  2 g Topical 4x Daily PRN Ciro Wooten MD      docusate sodium  100 mg Oral BID Ciro Wooten MD      DULoxetine  60 mg Oral HS Ciro Wooten MD      flecainide  100 mg Oral BID Ciro Wooten MD      gabapentin  100 mg Oral HS Ciro Wooten MD      hydrALAZINE  50 mg Oral [facility] DEEPTHI Spears      lidocaine   Topical 4x Daily PRN Ciro Wooten MD      methocarbamol  500 mg Oral Q6H PRN Ciro Wooten MD      metoprolol succinate  100 mg Oral Daily Ciro Wooten MD      oxyCODONE  2 5 mg Oral Q4H PRN Ciro Wooten MD      oxyCODONE  5 mg Oral Q4H PRN Ciro Wooten MD      pantoprazole  40 mg Oral Early Morning Ciro Wooten MD      polyethylene glycol  17 g Oral Daily PRN Ciro Wooten MD      senna  1 tablet Oral Daily Ciro Wooten MD      tamsulosin  0 4 mg Oral Daily With Shayy Mullins MD      warfarin  2 5 mg Oral Once per day on Sun Mon Tue Thu Fri Henna Hsieh PA-C      warfarin  5 mg Oral Once per day on Wed Dhaval Crystal PA-C         Chief Complaints:  Rehab follow-up    Subjective:     Patient reports sleeping better again last night but still with some intermittent slight burning pain on back of head  He reports mild neck soreness without radiating pain down arms, worsening strength, sensation, or balance  Patient denies fever, chills, sweats, SOB, lightheadedness or other new complaints        ROS: A 10 point ROS was performed; negative except as noted above  Physical Exam:  03/24/22 1500 98 1 °F (36 7 °C) 68 17 147/68 98 94 % None (Room air)      Vitals above reviewed on date of encounter    GEN:  Sitting in NAD   HEENT/NECK: C collar initially; still with slight sanguinous drainage; evolving ecchymosis; some increased erythema and possible mild slight increased edema; but without solitario pus expelled from site (note: whitish regions near posterior inferior incision are skin pigmentation and not solitario pus at this time); no increased warmth or increased TTP  CARDIAC: Regular rate rhythm, no murmers, no rubs, no gallops  LUNGS:  clear to auscultation, no wheezes, rales, or rhonchi  ABDOMEN: Soft, non-tender, non-distended, normal active bowel sounds  EXTREMITIES/SKIN:  no calf edema, no calf tenderness to palpation  NEURO:   MENTAL STATUS:  Appropriate wakefulness and interaction  and Strength/MMT:  5/5 throughout; FTN intact  PSYCH:  Affect:  Euthymic     HPI:  Mukund Larkin is a 71 y o  male with PMH of Afib, HTN, bradycardia, bilateral knee OA, depression, Factor V Leiden, on chronic A/C who developed progressive gait imbalance followed by more acute R sided weakness/sensory changes and imbalance found to have significant significant cervical spinal cord compression from degenerative spondylosis most consistent with cervical myelopathy  Patient underwent anterior cervical discectomy and fixation fusion C5/6 and C6/7; Posterior cervical decompression and instrumented fusion C3-T1 by Dr Vladimir Campuzano on 3/11/22  Post-op course notable for significant decline in ADLs and mobility and he appears to be appropriate for admission to [facility] at this time        ** Please Note: Fluency Direct voice to text software may have been used in the creation of this document  **    35 minutes or greater spent face-to-face with patient and family during this encounter and with coordination of care with IM and NSx    Extended discussion today held with patient and family regarding current condition, medical history, mood, medical/rehabilitation management, and disposition  I personally performed the required components and examined the patient myself in person on 3/24/22

## 2022-03-25 NOTE — PROGRESS NOTES
03/25/22 1230   Pain Assessment   Pain Assessment Tool 0-10   Pain Score 5   Pain Location/Orientation Location: Neck   Pain Radiating Towards radiating up on L side to head   Pain Onset/Description Descriptor: Burning   Restrictions/Precautions   Precautions Fall Risk;Spinal precautions   Braces or Orthoses C/S Collar   Cognition   Overall Cognitive Status WFL   Subjective   Subjective "i didn't sleep well last night so i feel groggy"   Sit to Lying   Type of Assistance Needed Supervision   Sit to Lying CARE Score 4   Sit to Stand   Type of Assistance Needed Incidental touching;Supervision   Comment CS/CGA   Sit to Stand CARE Score 4   Bed-Chair Transfer   Type of Assistance Needed Incidental touching; Adaptive equipment   Chair/Bed-to-Chair Transfer CARE Score 4   Car Transfer   Comment perform next session   Walk 10 Feet   Type of Assistance Needed Physical assistance   Physical Assistance Level 25% or less   Comment HHA x1 on R   Walk 10 Feet CARE Score 3   Walk 50 Feet with Two Turns   Type of Assistance Needed Physical assistance   Physical Assistance Level 25% or less   Comment HHA on R   Walk 50 Feet with Two Turns CARE Score 3   Walk 150 Feet   Type of Assistance Needed Physical assistance   Physical Assistance Level 25% or less   Comment HHA on R   Walk 150 Feet CARE Score 3   Ambulation   Distance Walked (feet) 150 ft  (x2)   Assist Device Hand Hold   Gait Pattern Inconsistant Danae;Decreased foot clearance;Narrow AZRA;Scissoring; Improper weight shift;Trendelenburg   Limitations Noted In Balance; Coordination;Sensation;Speed;Strength   Findings pt presents with lateral trunk flexion gait pattern  less scissoring today than previous sessions but cont to have unsteady gait and needs to stop to regain balance when that happens    working with no AD and with SPC vs  RW   Wheel 50 Feet with Two Turns   Reason if not Attempted Activity not applicable   Wheel 50 Feet with Two Turns CARE Score 9   Wheel 150 Feet   Reason if not Attempted Activity not applicable   Wheel 721 Feet CARE Score 9   Curb or Single Stair   Style negotiated Single stair   Type of Assistance Needed Incidental touching; Adaptive equipment   Comment CGA on FF  B HRs; reciprocal pattern    1 Step (Curb) CARE Score 4   4 Steps   Type of Assistance Needed Incidental touching; Adaptive equipment   Comment CGA on FF  B HRs; reciprocal pattern   4 Steps CARE Score 4   12 Steps   Type of Assistance Needed Incidental touching; Adaptive equipment   Comment CGA on FF  B HRs; reciprocal pattern   12 Steps CARE Score 4   Stairs   # of Steps 20   Findings FF x2   Therapeutic Interventions   Neuromuscular Re-Education bwd walking with 3# AW along rail 30'x4, sidestepping along rail 30'x4  partial squats wtih 3# wt in hands, increase R knee pain   Equipment Use   NuStep LEs only 10mins L2   Other Comments   Comments brief visits by neuro sx and wound care   Assessment   Treatment Assessment pt tolerated tx well despite being tired and having pain  pt cont to remain high fall risk and below baseline due to impaired balance, coordination, and safety without AD   pt practicing with SPC and RW   due to deficits pt will need device for home for stability and functional ind   pt cont to demonstrate trunk and proximal hip weakness causing trendelenburg and increase trunk flex with amb  pt does have RW at home and will not need to order one   cont to work on above mentioned deficits with LRAD to maximize functional mobility and ind for time of d/c  Problem List Decreased strength;Decreased range of motion;Decreased mobility; Decreased coordination; Impaired balance;Pain;Orthopedic restrictions;Decreased endurance   Barriers to Discharge Inaccessible home environment;Decreased caregiver support   PT Barriers   Functional Limitation Ramp negotiation;Stair negotiation; Walking   Plan   Progress Progressing toward goals   Recommendation   PT Discharge Recommendation Home with outpatient rehabilitation   Equipment Recommended Walker   PT Therapy Minutes   PT Time In 1230   PT Time Out 1400   PT Total Time (minutes) 90   PT Mode of treatment - Individual (minutes) 90   PT Mode of treatment - Concurrent (minutes) 0   PT Mode of treatment - Group (minutes) 0   PT Mode of treatment - Co-treat (minutes) 0   PT Mode of Treatment - Total time(minutes) 90 minutes   PT Cumulative Minutes 610   Therapy Time missed   Time missed?  No

## 2022-03-25 NOTE — ASSESSMENT & PLAN NOTE
POD14 Anterior cervical discectomy and fixation fusion C5/6 and C6/7; Posterior cervical decompression and instrumented fusion C3-T1 3/11/22 by Dr Rhiannon Rae  · Pre op- severe canal stenosis C5-6 with small area of signal abnormality   Presented with significant decline in ambulation along with numbness and weakness R>L over the last week  Reports multiple falls  Imaging:    Cervical spine x-ray 03/11/2022:ACDF C5-C7 and posterior fusion hardware from C3 through T1 as above  Plan:   Continue monitor neurological exam and symptoms   [place] collar to remain in place at all times, okay for [place] collar for showering  o Given patient's history of factor five Leiden, patient has now been bridged from a heparin drip to Warfarin   Medical management and pain control per primary team  o Continue Keflex 500 QID x 1 week  · Mobilize with PT/OT  · Encouraged incentive spirometer  · DVT PPX: SCDs and Warfarin      Neurosurgery will sign off  Please call with questions in the interim  Plan for outpatient follow-up for one week postoperative visit for an incision check  Plan of care discussed with Dr Eagle Garrison

## 2022-03-25 NOTE — PLAN OF CARE
Problem: PAIN - ADULT  Goal: Verbalizes/displays adequate comfort level or baseline comfort level  Description: Interventions:  - Encourage patient to monitor pain and request assistance  - Assess pain using appropriate pain scale  - Administer analgesics based on type and severity of pain and evaluate response  - Implement non-pharmacological measures as appropriate and evaluate response  - Consider cultural and social influences on pain and pain management  - Notify physician/advanced practitioner if interventions unsuccessful or patient reports new pain  Outcome: Progressing     Problem: INFECTION - ADULT  Goal: Absence or prevention of progression during hospitalization  Description: INTERVENTIONS:  - Assess and monitor for signs and symptoms of infection  - Monitor lab/diagnostic results  - Monitor all insertion sites, i e  indwelling lines, tubes, and drains  - Monitor endotracheal if appropriate and nasal secretions for changes in amount and color  - [place] appropriate cooling/warming therapies per order  - Administer medications as ordered  - Instruct and encourage patient and family to use good hand hygiene technique  - Identify and instruct in appropriate isolation precautions for identified infection/condition  Outcome: Progressing  Goal: Absence of fever/infection during neutropenic period  Description: INTERVENTIONS:  - Monitor WBC    Outcome: Progressing     Problem: SAFETY ADULT  Goal: Patient will remain free of falls  Description: INTERVENTIONS:  - Educate patient/family on patient safety including physical limitations  - Instruct patient to call for assistance with activity   - Consult OT/PT to assist with strengthening/mobility   - Keep Call bell within reach  - Keep bed low and locked with side rails adjusted as appropriate  - Keep care items and personal belongings within reach  - Initiate and maintain comfort rounds  - Make Fall Risk Sign visible to staff  - Offer Toiletin  Problem: PAIN - ADULT  Goal: Verbalizes/displays adequate comfort level or baseline comfort level  Description: Interventions:  - Encourage patient to monitor pain and request assistance  - Assess pain using appropriate pain scale  - Administer analgesics based on type and severity of pain and evaluate response  - Implement non-pharmacological measures as appropriate and evaluate response  - Consider cultural and social influences on pain and pain management  - Notify physician/advanced practitioner if interventions unsuccessful or patient reports new pain  Outcome: Progressing     Problem: INFECTION - ADULT  Goal: Absence or prevention of progression during hospitalization  Description: INTERVENTIONS:  - Assess and monitor for signs and symptoms of infection  - Monitor lab/diagnostic results  - Monitor all insertion sites, i e  indwelling lines, tubes, and drains  - Monitor endotracheal if appropriate and nasal secretions for changes in amount and color  - [place] appropriate cooling/warming therapies per order  - Administer medications as ordered  - Instruct and encourage patient and family to use good hand hygiene technique  - Identify and instruct in appropriate isolation precautions for identified infection/condition  Outcome: Progressing  Goal: Absence of fever/infection during neutropenic period  Description: INTERVENTIONS:  - Monitor WBC    Outcome: Progressing     Problem: SAFETY ADULT  Goal: Patient will remain free of falls  Description: INTERVENTIONS:  - Educate patient/family on patient safety including physical limitations  - Instruct patient to call for assistance with activity   - Consult OT/PT to assist with strengthening/mobility   - Keep Call bell within reach  - Keep bed low and locked with side rails adjusted as appropriate  - Keep care items and personal belongings within reach  - Initiate and maintain comfort rounds  - Make Fall Risk Sign visible to staff  - Offer Toileting [place] of need  - Initiate/  - Obtain necessary fall   - Apply yellow socks and bracelet for high fall risk patients  - Consider moving patient to room near nurses station  Outcome: Progressing  Goal: Maintain or return to baseline ADL function  Description: INTERVENTIONS:  -  Assess patient's ability to carry out ADLs; assess patient's baseline for ADL function and identify physical deficits which impact ability to perform ADLs (bathing, care of mouth/teeth, toileting, grooming, dressing, etc )  - Assess/evaluate cause of self-care deficits   - Assess range of motion  - Assess patient's mobility; develop plan if impaired  - Assess patient's need for assistive devices and provide as appropriate  - Encourage maximum independence but intervene and supervise when necessary  - Involve family in performance of ADLs  - Assess for home care needs following discharge   - Consider OT consult to assist with ADL evaluation and planning for discharge  - Provide patient education as appropriate  Outcome: Progressing  Goal: Maintains/Returns to pre admission functional level  Description: INTERVENTIONS:  - Perform BMAT or MOVE assessment daily    - Set and communicate daily mobility goal to care team and patient/family/caregiver  - Collaborate with rehabilitation services on mobility goals if consulted  - Perform Range of [place] a day  - Reposition patiens    - Dangle   - Out of   - Out of bed for toileting  - Record patient progress and toleration of activity level   Outcome: Progressing     Problem: DISCHARGE PLANNING  Goal: Discharge to home or other facility with appropriate resources  Description: INTERVENTIONS:  - Identify barriers to discharge w/patient and caregiver  - Arrange for needed discharge resources and transportation as appropriate  - Identify discharge learning needs (meds, wound care, etc )  - Arrange for interpretive services to assist at discharge as needed  - Refer to Case Management Department for coordinating discharge planning if the patient needs post-hospital services based on physician/advanced practitioner order or complex needs related to functional status, cognitive ability, or social support system  Outcome: Progressing     Problem: Knowledge Deficit  Goal: Patient/family/caregiver demonstrates understanding of disease process, treatment plan, medications, and discharge instructions  Description: Complete learning assessment and assess knowledge base    Interventions:  - Provide teaching at level of understanding  - Provide teaching via preferred learning methods  Outcome: Progressing     Problem: Potential for Falls  Goal: Patient will remain free of falls  Description: INTERVENTIONS:  - Educate patient/family on patient safety including physical limitations  - Instruct patient to call for assistance with activity   - Consult OT/PT to assist with strengthening/mobility   - Keep Call bell within reach  - Keep bed low and locked with side rails adjusted as appropriate  - Keep care items and personal belongings within reach  - Initiate and maintain comfort rounds  - Make Fall Risk Sign visible to staff  - Offer Toileting every  Hours, in advance of need  - Initiate/Maintain larm  - Obtain necessary fall risk management equipment:   - Apply yellow socks and bracelet for high fall risk patients  - Consider moving patient to room near nurses station  Outcome: Progressing   s, in advance of need  - Initiate/Ma  - Obtain necessary fall risk managemen  - Apply yellow socks and bracelet for high fall risk patients  - Consider moving patient to room near nurses station  Outcome: Progressing  Goal: Maintain or return to baseline ADL function  Description: INTERVENTIONS:  -  Assess patient's ability to carry out ADLs; assess patient's baseline for ADL function and identify physical deficits which impact ability to perform ADLs (bathing, care of mouth/teeth, toileting, grooming, dressing, etc )  - Assess/evaluate cause of self-care deficits   - Assess range of motion  - Assess patient's mobility; develop plan if impaired  - Assess patient's need for assistive devices and provide as appropriate  - Encourage maximum independence but intervene and supervise when necessary  - Involve family in performance of ADLs  - Assess for home care needs following discharge   - Consider OT consult to assist with ADL evaluation and planning for discharge  - Provide patient education as appropriate  Outcome: Progressing  Goal: Maintains/Returns to pre admission functional level  Description: INTERVENTIONS:  - Perform BMAT or MOVE assessment daily    - Set and communicate daily mobility goal to care team and patient/family/caregiver  - Collaborate with rehabilitation services on mobility goals if consulted  - Perform Range of imes a day  - Reposition patours  - D  - Stand patien  - Ambulate [place]  - Out of bed to   -  - Out of bed for toileting  - Record patient progress and toleration of activity level   Outcome: Progressing     Problem: DISCHARGE PLANNING  Goal: Discharge to home or other facility with appropriate resources  Description: INTERVENTIONS:  - Identify barriers to discharge w/patient and caregiver  - Arrange for needed discharge resources and transportation as appropriate  - Identify discharge learning needs (meds, wound care, etc )  - Arrange for interpretive services to assist at discharge as needed  - Refer to Case Management Department for coordinating discharge planning if the patient needs post-hospital services based on physician/advanced practitioner order or complex needs related to functional status, cognitive ability, or social support system  Outcome: Progressing     Problem: Knowledge Deficit  Goal: Patient/family/caregiver demonstrates understanding of disease process, treatment plan, medications, and discharge instructions  Description: Complete learning assessment and assess knowledge base    Interventions:  - Provide teaching at level of understanding  - Provide teaching via preferred learning methods  Outcome: Progressing     Problem: Potential for Falls  Goal: Patient will remain free of falls  Description: INTERVENTIONS:  - Educate patient/family on patient safety including physical limitations  - Instruct patient to call for assistance with activity   - Consult OT/PT to assist with strengthening/mobility   - Keep Call bell within reach  - Keep bed low and locked with side rails adjusted as appropriate  - Keep care items and personal belongings within reach  - Initiate and maintain comfort rounds  - Make Fall Risk Sign visible to staff  - Offer Toileting every  Hours, in advance of need  - Initiate/Maintain larm  - Obtain necessary fall risk management equipment:   - Apply yellow socks and bracelet for high fall risk patients  - Consider moving patient to room near nurses station  Outcome: Progressing

## 2022-03-25 NOTE — PLAN OF CARE
Problem: PAIN - ADULT  Goal: Verbalizes/displays adequate comfort level or baseline comfort level  Description: Interventions:  - Encourage patient to monitor pain and request assistance  - Assess pain using appropriate pain scale  - Administer analgesics based on type and severity of pain and evaluate response  - Implement non-pharmacological measures as appropriate and evaluate response  - Consider cultural and social influences on pain and pain management  - Notify physician/advanced practitioner if interventions unsuccessful or patient reports new pain  Outcome: Progressing     Problem: INFECTION - ADULT  Goal: Absence or prevention of progression during hospitalization  Description: INTERVENTIONS:  - Assess and monitor for signs and symptoms of infection  - Monitor lab/diagnostic results  - Monitor all insertion sites, i e  indwelling lines, tubes, and drains  - Monitor endotracheal if appropriate and nasal secretions for changes in amount and color  - [place] appropriate cooling/warming therapies per order  - Administer medications as ordered  - Instruct and encourage patient and family to use good hand hygiene technique  - Identify and instruct in appropriate isolation precautions for identified infection/condition  Outcome: Progressing  Goal: Absence of fever/infection during neutropenic period  Description: INTERVENTIONS:  - Monitor WBC    Outcome: Progressing     Problem: SAFETY ADULT  Goal: Patient will remain free of falls  Description: INTERVENTIONS:  - Educate patient/family on patient safety including physical limitations  - Instruct patient to call for assistance with activity   - Consult OT/PT to assist with strengthening/mobility   - Keep Call bell within reach  - Keep bed low and locked with side rails adjusted as appropriate  - Keep care items and personal belongings within reach  - Initiate and maintain comfort rounds  - Make Fall Risk Sign visible to staff  - Offer Toileting every  Hours, in advance of need  - Initiate/Maintain alarm  - Obtain necessary fall risk management equipment:   - Apply yellow socks and bracelet for high fall risk patients  - Consider moving patient to room near nurses station  Outcome: Progressing  Goal: Maintain or return to baseline ADL function  Description: INTERVENTIONS:  -  Assess patient's ability to carry out ADLs; assess patient's baseline for ADL function and identify physical deficits which impact ability to perform ADLs (bathing, care of mouth/teeth, toileting, grooming, dressing, etc )  - Assess/evaluate cause of self-care deficits   - Assess range of motion  - Assess patient's mobility; develop plan if impaired  - Assess patient's need for assistive devices and provide as appropriate  - Encourage maximum independence but intervene and supervise when necessary  - Involve family in performance of ADLs  - Assess for home care needs following discharge   - Consider OT consult to assist with ADL evaluation and planning for discharge  - Provide patient education as appropriate  Outcome: Progressing  Goal: Maintains/Returns to pre admission functional level  Description: INTERVENTIONS:  - Perform BMAT or MOVE assessment daily    - Set and communicate daily mobility goal to care team and patient/family/caregiver  - Collaborate with rehabilitation services on mobility goals if consulted  - Perform Range of Motion  times a day  - Reposition patient every  hours    - Dangle patient  times a day  - Stand patient  times a day  - Ambulate patient  times a day  - Out of bed to chair  times a day   - Out of bed for meals  times a day  - Out of bed for toileting  - Record patient progress and toleration of activity level   Outcome: Progressing     Problem: DISCHARGE PLANNING  Goal: Discharge to home or other facility with appropriate resources  Description: INTERVENTIONS:  - Identify barriers to discharge w/patient and caregiver  - Arrange for needed discharge resources and transportation as appropriate  - Identify discharge learning needs (meds, wound care, etc )  - Arrange for interpretive services to assist at discharge as needed  - Refer to Case Management Department for coordinating discharge planning if the patient needs post-hospital services based on physician/advanced practitioner order or complex needs related to functional status, cognitive ability, or social support system  Outcome: Progressing     Problem: Knowledge Deficit  Goal: Patient/family/caregiver demonstrates understanding of disease process, treatment plan, medications, and discharge instructions  Description: Complete learning assessment and assess knowledge base    Interventions:  - Provide teaching at level of understanding  - Provide teaching via preferred learning methods  Outcome: Progressing     Problem: Potential for Falls  Goal: Patient will remain free of falls  Description: INTERVENTIONS:  - Educate patient/family on patient safety including physical limitations  - Instruct patient to call for assistance with activity   - Consult OT/PT to assist with strengthening/mobility   - Keep Call bell within reach  - Keep bed low and locked with side rails adjusted as appropriate  - Keep care items and personal belongings within reach  - Initiate and maintain comfort rounds  - Make Fall Risk Sign visible to staff  - Offer Toileting every  Hours, in advance of need  - Initiate/Maintain alarm  - Obtain necessary fall risk management equipment:   - Apply yellow socks and bracelet for high fall risk patients  - Consider moving patient to room near nurses station  Outcome: Progressing

## 2022-03-26 PROCEDURE — 99232 SBSQ HOSP IP/OBS MODERATE 35: CPT | Performed by: INTERNAL MEDICINE

## 2022-03-26 PROCEDURE — 97110 THERAPEUTIC EXERCISES: CPT

## 2022-03-26 PROCEDURE — 97116 GAIT TRAINING THERAPY: CPT

## 2022-03-26 PROCEDURE — 97530 THERAPEUTIC ACTIVITIES: CPT

## 2022-03-26 PROCEDURE — 97112 NEUROMUSCULAR REEDUCATION: CPT

## 2022-03-26 RX ADMIN — TAMSULOSIN HYDROCHLORIDE 0.4 MG: 0.4 CAPSULE ORAL at 17:00

## 2022-03-26 RX ADMIN — GABAPENTIN 300 MG: 300 CAPSULE ORAL at 21:07

## 2022-03-26 RX ADMIN — HYDRALAZINE HYDROCHLORIDE 50 MG: 50 TABLET, FILM COATED ORAL at 05:38

## 2022-03-26 RX ADMIN — HYDRALAZINE HYDROCHLORIDE 50 MG: 50 TABLET, FILM COATED ORAL at 14:09

## 2022-03-26 RX ADMIN — CEPHALEXIN 500 MG: 500 CAPSULE ORAL at 05:37

## 2022-03-26 RX ADMIN — WARFARIN SODIUM 2.5 MG: 2.5 TABLET ORAL at 17:00

## 2022-03-26 RX ADMIN — AMLODIPINE BESYLATE 5 MG: 5 TABLET ORAL at 21:07

## 2022-03-26 RX ADMIN — DOCUSATE SODIUM 100 MG: 100 CAPSULE, LIQUID FILLED ORAL at 17:00

## 2022-03-26 RX ADMIN — ACETAMINOPHEN 975 MG: 325 TABLET ORAL at 14:09

## 2022-03-26 RX ADMIN — CEPHALEXIN 500 MG: 500 CAPSULE ORAL at 17:00

## 2022-03-26 RX ADMIN — FLECAINIDE ACETATE 100 MG: 100 TABLET ORAL at 17:01

## 2022-03-26 RX ADMIN — CEPHALEXIN 500 MG: 500 CAPSULE ORAL at 12:39

## 2022-03-26 RX ADMIN — CEPHALEXIN 500 MG: 500 CAPSULE ORAL at 23:21

## 2022-03-26 RX ADMIN — ATORVASTATIN CALCIUM 40 MG: 40 TABLET, FILM COATED ORAL at 17:00

## 2022-03-26 RX ADMIN — ACETAMINOPHEN 975 MG: 325 TABLET ORAL at 05:37

## 2022-03-26 RX ADMIN — FLECAINIDE ACETATE 100 MG: 100 TABLET ORAL at 08:06

## 2022-03-26 RX ADMIN — GABAPENTIN 100 MG: 100 CAPSULE ORAL at 08:06

## 2022-03-26 RX ADMIN — ACETAMINOPHEN 975 MG: 325 TABLET ORAL at 21:07

## 2022-03-26 RX ADMIN — PANTOPRAZOLE SODIUM 40 MG: 40 TABLET, DELAYED RELEASE ORAL at 05:37

## 2022-03-26 RX ADMIN — HYDRALAZINE HYDROCHLORIDE 50 MG: 50 TABLET, FILM COATED ORAL at 21:07

## 2022-03-26 RX ADMIN — DULOXETINE HYDROCHLORIDE 60 MG: 60 CAPSULE, DELAYED RELEASE ORAL at 21:07

## 2022-03-26 NOTE — PROGRESS NOTES
03/26/22 0837   Pain Assessment   Pain Assessment Tool 0-10   Pain Score 3   Pain Location/Orientation Orientation: Left; Location: Head   Pain Onset/Description Onset: Ongoing;Frequency: Intermittent; Descriptor: Burning   Hospital Pain Intervention(s) Rest   Restrictions/Precautions   Precautions Fall Risk;Spinal precautions   Braces or Orthoses C/S Collar   Cognition   Overall Cognitive Status WFL   Arousal/Participation Alert; Cooperative   Subjective   Subjective pt agreeable to have PT after dressing changes by MD and RN  cont to report burning sensation on L side of head yuri when it touches something like a pillow  Roll Left and Right   Type of Assistance Needed Independent   Roll Left and Right CARE Score 6   Sit to Lying   Type of Assistance Needed Independent   Sit to Lying CARE Score 6   Lying to Sitting on Side of Bed   Type of Assistance Needed Independent   Comment HOB flat without rail, log roll technique, left side of the bed to simulate home set up    Lying to Sitting on Side of Bed CARE Score 6   Sit to Stand   Type of Assistance Needed Independent; Adaptive equipment   Comment RW   Sit to Stand CARE Score 6   Bed-Chair Transfer   Type of Assistance Needed Adaptive equipment; Independent   Comment RW   Chair/Bed-to-Chair Transfer CARE Score 6   Car Transfer   Type of Assistance Needed Supervision; Adaptive equipment;Verbal cues   Car Transfer CARE Score 4   Walk 10 Feet   Type of Assistance Needed Independent; Adaptive equipment   Walk 10 Feet CARE Score 6   Walk 50 Feet with Two Turns   Type of Assistance Needed Supervision; Adaptive equipment   Walk 50 Feet with Two Turns CARE Score 4   Walk 150 Feet   Type of Assistance Needed Supervision; Adaptive equipment;Verbal cues   Comment RW CW4 to/from NW9   Walk 150 Feet CARE Score 4   Walking 10 Feet on Uneven Surfaces   Type of Assistance Needed Supervision;Verbal cues; Adaptive equipment   Comment floor mat with RW   Walking 10 Feet on Uneven Surfaces CARE Score 4   Curb or Single Stair   Style negotiated Curb   Type of Assistance Needed Incidental touching;Verbal cues; Adaptive equipment   Comment 8" curb with RW, CG with VC for sequencing    1 Step (Curb) CARE Score 4   Picking Up Object   Type of Assistance Needed Set-up / [place]; Adaptive equipment   Comment reacher with RW support- marker    Picking Up Object CARE Score 5   Toilet Transfer   Type of Assistance Needed Independent; Adaptive equipment   Comment regular toilet with grab bar and RW- per pt has a grab bar on toilet at home    Toilet Transfer CARE Score 6   Therapeutic Interventions   Neuromuscular Re-Education walking fwd/bwd without visual cues with R rail support x 30' x 3 bouts before seated rest break then side stepping x 30' x 2 with 3# ankle wts  Facil and VC for correct technique and placement   Equipment Use   NuStep L3 x 10mins all ext , SPM> 75 - no reported exacerbation of pain    Assessment   Treatment Assessment PT worked on care score mobility reassessment with emphasis on functional mobility training using a RW in preparation for IRP which will be made official by OT after PM session  Pt demo'd appropriate safety awareness and stability when mobilizing using a RW without LOB or knee buckling  Pt also engaged in [address] and thera ex with focus on proximal mm strengthening  PT will work on NPP/NMR training tomorrow with [facility] or no AD to improve gait and balance to reduce risk for falls and improve overall stability and safety during mobilities  Family/Caregiver Present no   Barriers to Discharge Inaccessible home environment;Decreased caregiver support   PT Barriers   Functional Limitation Ramp negotiation;Stair negotiation; Walking   Plan   Treatment/Interventions Functional transfer training;LE strengthening/ROM; Therapeutic exercise;Cognitive reorientation; Bed mobility;Gait training;Spoke to nursing;OT   Progress Progressing toward goals   Recommendation   PT Discharge Recommendation Home with outpatient rehabilitation   PT - OK to Discharge No   PT Therapy Minutes   PT Time In 0837   PT Time Out 0930   PT Total Time (minutes) 53   PT Mode of treatment - Individual (minutes) 53   PT Mode of treatment - Concurrent (minutes) 0   PT Mode of treatment - Group (minutes) 0   PT Mode of treatment - Co-treat (minutes) 0   PT Mode of Treatment - Total time(minutes) 53 minutes   PT Cumulative Minutes 663   Therapy Time missed   Time missed?  No

## 2022-03-26 NOTE — PROGRESS NOTES
Internal Medicine Progress Note  Patient: Rajeev Duenas  Age/sex: 71 y o  male  Medical Record #: [id number]      ASSESSMENT/PLAN: (Interval History)  Rajeev Duenas is seen and examined and management for following issues:    S/p anterior cervical discectomy and fixation fusion C5-6 and C6-7, posterior cervical decompression and instrumented fusion C3-T1  · Aspen collar at all times  · S/p Decadron   · Pain control and therapy per PMR  · Pt continues to have bleeding from the distal portion of the posterior incision with bruising  · Pt with areas of skin breakdown at the staples at the distal portion of the incision  Possibly due to moisture  NS following  · Keflex started 3/24  · Appreciate wound nurse reccs to use maxsorb and allevyn bid  · Cont coumadin 2/2 Factor V     PAF  · INR 2 64  · Pt takes Coumadin 5mg Wed and Sat and 2 5mg Mon, Tues, Thurs, Fri and Sun  · Cont coumadin 5mg on Wednesdays only and 2 5 mg all other days   · INR has remained therapeutic  · Repeat INR monday     HTN  · Has been elevated during hospitalization possible due to steroids  · Continue amlodipine 5mg 2x daily,  Toprol XL 100mg daily  · BP starting to improve likely d/t steroids being completed  · Dc hydralazine 3/25  · BP stable      Leiden Factor V  · Coumadin as above  Hyponatremia  · Na improving - 134  · Will continue to monitor  DC planning: TBD  The above assessment and plan was reviewed and updated as determined by my evaluation of the patient on 3/26/2022      Labs:   Results from last 7 days   Lab Units 03/24/22  0706 03/21/22  0614   WBC Thousand/uL 8 61 9 22   HEMOGLOBIN g/dL 11 0* 12 1   HEMATOCRIT % 30 8* 33 7*   PLATELETS Thousands/uL 266 245     Results from last 7 days   Lab Units 03/24/22  0706 03/21/22  0614   SODIUM mmol/L 134* 132*   POTASSIUM mmol/L 3 6 3 7   CHLORIDE mmol/L 99* 97*   CO2 mmol/L 30 27   BUN mg/dL 14 13   CREATININE mg/dL 0 65 0 60   CALCIUM mg/dL 9 3 9 3         Results from last 7 days   Lab Units 03/24/22  0706 03/22/22  0603   INR  2 64* 2 68*     Results from last 7 days   Lab Units 03/25/22  0633 03/21/22  0926 03/21/22  0625   POC GLUCOSE mg/dl 130 171* 108       Review of Scheduled Meds:  Current Facility-Administered Medications   Medication Dose Route Frequency Provider Last Rate    acetaminophen  975 mg Oral [facility] Yanni Rose MD      amLODIPine  5 mg Oral BID Yanni Rose MD      atorvastatin  40 mg Oral QPM Yanni Rose MD      bisacodyl  10 mg Rectal Daily PRN Yanni Rose MD      calcium carbonate  1,000 mg Oral Daily PRN Yanni Rose MD      cephalexin  500 mg Oral Q6H [address] Yanni Rose MD      Diclofenac Sodium  2 g Topical 4x Daily PRN Yanni Rose MD      docusate sodium  100 mg Oral BID Yanni Rose MD      DULoxetine  60 mg Oral HS Yanni Rose MD      flecainide  100 mg Oral BID Yanni Rose MD      gabapentin  100 mg Oral Daily Yanni Rose MD      gabapentin  300 mg Oral HS Yanni Rose MD      hydrALAZINE  50 mg Oral [facility] DEEPTHI Ann      lidocaine   Topical 4x Daily PRN Yanni Rose MD      methocarbamol  500 mg Oral Q6H PRN Yanni Rose MD      metoprolol succinate  100 mg Oral Daily Yanni Rose MD      oxyCODONE  2 5 mg Oral Q4H PRN Yanni Rose MD      oxyCODONE  5 mg Oral Q4H PRN Yanni Rose MD      pantoprazole  40 mg Oral Early Morning Yanni Rose MD      polyethylene glycol  17 g Oral Daily PRN Yanni Rose MD      senna  1 tablet Oral Daily Yanni Rose MD      tamsulosin  0 4 mg Oral Daily With Anahy Worrell MD      warfarin  2 5 mg Oral Once per day on Sun Mon Tue Thu Fri Sat DEEPTHI Ann      [START ON 3/30/2022] warfarin  5 mg Oral Once per day on Wed DEEPTHI Cormier         Subjective/ HPI: Patient seen and examined  Patients overnight issues or events were reviewed with nursing or staff during rounds or morning huddle session   New or overnight issues include the following:     Pt seen in his room  Slept much better overnight  States the scalp burning was much better  No other issues overnight or this am        ROS:   A 10 point ROS was performed; negative except as noted above  Imaging:     No orders to display       *Labs /Radiology studies Reviewed  *Medications  reviewed and reconciled as needed  *Please refer to order section for additional ordered labs studies  *Case discussed with primary attending during morning huddle case rounds    Physical Examination:  Vitals:   Vitals:    03/25/22 2116 03/25/22 2157 03/26/22 0530 03/26/22 0600   BP: 128/65 128/65 130/64    BP Location:  Left arm Left arm    Pulse:  77 68    Resp:  20 20    Temp:  99 °F (37 2 °C) 98 1 °F (36 7 °C)    TempSrc:  Oral Oral    SpO2:  95% 93%    Weight:    104 kg (229 lb 1 6 oz)   Height:         GEN: No apparent distress, interactive  NEURO: Alert and oriented x3  HEENT: Pupils are equal and reactive, EOMI, mucous membranes are moist, face symmetrical; aspen collar in place  CV: S1 S2 regular, no MRG, no peripheral edema noted  RESP: Lungs are clear bilaterally, no wheezes, rales or rhonchi noted, on room air, respirations easy and non labored  GI: Flat, soft non tender, non distended; +BS x4  : Voiding without difficulty  MUSC: Moves all extremities  SKIN: pink, warm and dry, normal turgor, no rashes, lesions        The above physical exam was reviewed and updated as determined by my evaluation of the patient on 3/26/2022  Invasive Devices  Report    None                    VTE Pharmacologic Prophylaxis: Warfarin (Coumadin)  Code Status: Level 3 - DNAR and DNI  Current Length of Stay: 8 day(s)      Total time spent:  30 minutes with more than 50% spent counseling/coordinating care  Counseling includes discussion with patient re: progress  and discussion with patient of his/her current medical state/information   Coordination of patient's care was performed in conjunction with primary service  Time invested included review of patient's labs, vitals, and management of their comorbidities with continued monitoring  In addition, this patient was discussed with medical team including physician and advanced extenders  The care of the patient was extensively discussed and appropriate treatment plan was formulated unique for this patient  ** Please Note:  voice to text software may have been used in the creation of this document   Although proof errors in transcription or interpretation are a potential of such software**

## 2022-03-26 NOTE — PLAN OF CARE
Reviewed    Problem: PAIN - ADULT  Goal: Verbalizes/displays adequate comfort level or baseline comfort level  Description: Interventions:  - Encourage patient to monitor pain and request assistance  - Assess pain using appropriate pain scale  - Administer analgesics based on type and severity of pain and evaluate response  - Implement non-pharmacological measures as appropriate and evaluate response  - Consider cultural and social influences on pain and pain management  - Notify physician/advanced practitioner if interventions unsuccessful or patient reports new pain  Outcome: Progressing     Problem: INFECTION - ADULT  Goal: Absence or prevention of progression during hospitalization  Description: INTERVENTIONS:  - Assess and monitor for signs and symptoms of infection  - Monitor lab/diagnostic results  - Monitor all insertion sites, i e  indwelling lines, tubes, and drains  - Monitor endotracheal if appropriate and nasal secretions for changes in amount and color  - [place] appropriate cooling/warming therapies per order  - Administer medications as ordered  - Instruct and encourage patient and family to use good hand hygiene technique  - Identify and instruct in appropriate isolation precautions for identified infection/condition  Outcome: Progressing  Goal: Absence of fever/infection during neutropenic period  Description: INTERVENTIONS:  - Monitor WBC    Outcome: Progressing     Problem: SAFETY ADULT  Goal: Patient will remain free of falls  Description: INTERVENTIONS:  - Educate patient/family on patient safety including physical limitations  - Instruct patient to call for assistance with activity   - Consult OT/PT to assist with strengthening/mobility   - Keep Call bell within reach  - Keep bed low and locked with side rails adjusted as appropriate  - Keep care items and personal belongings within reach  - Initiate and maintain comfort rounds  - Make Fall Risk Sign visible to staff  - Offer Toileting every 4 Hours, in advance of need  - Apply yellow socks and bracelet for high fall risk patients  - Consider moving patient to room near nurses station  Outcome: Progressing  Goal: Maintain or return to baseline ADL function  Description: INTERVENTIONS:  -  Assess patient's ability to carry out ADLs; assess patient's baseline for ADL function and identify physical deficits which impact ability to perform ADLs (bathing, care of mouth/teeth, toileting, grooming, dressing, etc )  - Assess/evaluate cause of self-care deficits   - Assess range of motion  - Assess patient's mobility; develop plan if impaired  - Assess patient's need for assistive devices and provide as appropriate  - Encourage maximum independence but intervene and supervise when necessary  - Involve family in performance of ADLs  - Assess for home care needs following discharge   - Consider OT consult to assist with ADL evaluation and planning for discharge  - Provide patient education as appropriate  Outcome: Progressing  Goal: Maintains/Returns to pre admission functional level  Description: INTERVENTIONS:  - Set and communicate daily mobility goal to care team and patient/family/caregiver     - Collaborate with rehabilitation services on mobility goals if consulted  - Out of bed for toileting  - Record patient progress and toleration of activity level   Outcome: Progressing     Problem: DISCHARGE PLANNING  Goal: Discharge to home or other facility with appropriate resources  Description: INTERVENTIONS:  - Identify barriers to discharge w/patient and caregiver  - Arrange for needed discharge resources and transportation as appropriate  - Identify discharge learning needs (meds, wound care, etc )  - Arrange for interpretive services to assist at discharge as needed  - Refer to Case Management Department for coordinating discharge planning if the patient needs post-hospital services based on physician/advanced practitioner order or complex needs related to functional status, cognitive ability, or social support system  Outcome: Progressing     Problem: Knowledge Deficit  Goal: Patient/family/caregiver demonstrates understanding of disease process, treatment plan, medications, and discharge instructions  Description: Complete learning assessment and assess knowledge base    Interventions:  - Provide teaching at level of understanding  - Provide teaching via preferred learning methods  Outcome: Progressing

## 2022-03-26 NOTE — NURSING NOTE
Posterior neck dressing change performed during OT session with pt's wife Praveena Tijerina present  Praveena Tijerina observed dressing change

## 2022-03-26 NOTE — PROGRESS NOTES
03/26/22 1330   Pain Assessment   Pain Assessment Tool 0-10   Pain Score 3   Pain Location/Orientation Location: Head   Restrictions/Precautions   Precautions Fall Risk;Spinal precautions   Braces or Orthoses C/S Collar   Lifestyle   Autonomy "I've made progress everyday"   Sit to Stand   Type of Assistance Needed Independent   Comment RW   Sit to Stand CARE Score 6   Bed-Chair Transfer   Type of Assistance Needed Independent; Adaptive equipment   Physical Assistance Level No physical assistance   Comment RW   Chair/Bed-to-Chair Transfer CARE Score 6   Toileting Hygiene   Type of Assistance Needed Independent   Physical Assistance Level No physical assistance   Comment seated for BM and urinate  indep in stance for CM   Toileting Hygiene CARE Score 6   Toilet Transfer   Type of Assistance Needed Independent   Physical Assistance Level No physical assistance   Comment standard toilet   Toilet Transfer CARE Score 6   Cognition   Overall Cognitive Status WFL   Activity Tolerance   Activity Tolerance Patient tolerated treatment well   Assessment   Treatment Assessment Pt engaged in skilled OT session with focus on completing FT with collar management and dressing change with nursing present  Pt and wife Hugh Springer tolerated session well, demoing improved understanding of C-Collar management and dressing changes  Provided handout for pad changes and cleaning of pads along with verbal and visual instructions  Wife became hands on with education demoing good ability to properly place and change pads  With nursing present, reviewed dressing changes and recommendations for position when completing  Pt able to sit supported in chair to complete dressing change  Pt and wife with no additional questions and grateful for hands on review of C-Collar management  OT Family training done with: wifeHugh   Prognosis Good   Problem List Decreased strength;Decreased range of motion;Decreased mobility; Decreased coordination; Impaired balance;Pain;Orthopedic restrictions;Decreased endurance   Barriers to Discharge Inaccessible home environment;Decreased caregiver support   Plan   Treatment/Interventions ADL retraining;Functional transfer training; Endurance training; Therapeutic exercise;Patient/family training;Equipment eval/education; Compensatory technique education   Progress Progressing toward goals   Recommendation   OT Discharge Recommendation   (No OT needs)   OT Therapy Minutes   OT Time In 1330   OT Time Out 1430   OT Total Time (minutes) 60   OT Mode of treatment - Individual (minutes) 60   OT Mode of treatment - Concurrent (minutes) 0   OT Mode of treatment - Group (minutes) 0   OT Mode of treatment - Co-treat (minutes) 0   OT Mode of Treatment - Total time(minutes) 60 minutes   OT Cumulative Minutes 708

## 2022-03-27 PROCEDURE — 97116 GAIT TRAINING THERAPY: CPT

## 2022-03-27 PROCEDURE — 97535 SELF CARE MNGMENT TRAINING: CPT

## 2022-03-27 PROCEDURE — 97530 THERAPEUTIC ACTIVITIES: CPT

## 2022-03-27 PROCEDURE — 99232 SBSQ HOSP IP/OBS MODERATE 35: CPT | Performed by: INTERNAL MEDICINE

## 2022-03-27 PROCEDURE — 97110 THERAPEUTIC EXERCISES: CPT

## 2022-03-27 PROCEDURE — 97112 NEUROMUSCULAR REEDUCATION: CPT

## 2022-03-27 RX ADMIN — METOPROLOL SUCCINATE 100 MG: 100 TABLET, EXTENDED RELEASE ORAL at 10:22

## 2022-03-27 RX ADMIN — GABAPENTIN 100 MG: 100 CAPSULE ORAL at 10:21

## 2022-03-27 RX ADMIN — AMLODIPINE BESYLATE 5 MG: 5 TABLET ORAL at 21:38

## 2022-03-27 RX ADMIN — PANTOPRAZOLE SODIUM 40 MG: 40 TABLET, DELAYED RELEASE ORAL at 06:52

## 2022-03-27 RX ADMIN — CEPHALEXIN 500 MG: 500 CAPSULE ORAL at 23:45

## 2022-03-27 RX ADMIN — DOCUSATE SODIUM 100 MG: 100 CAPSULE, LIQUID FILLED ORAL at 17:43

## 2022-03-27 RX ADMIN — CEPHALEXIN 500 MG: 500 CAPSULE ORAL at 11:47

## 2022-03-27 RX ADMIN — HYDRALAZINE HYDROCHLORIDE 50 MG: 50 TABLET, FILM COATED ORAL at 21:38

## 2022-03-27 RX ADMIN — METHOCARBAMOL 500 MG: 500 TABLET ORAL at 21:38

## 2022-03-27 RX ADMIN — GABAPENTIN 300 MG: 300 CAPSULE ORAL at 21:38

## 2022-03-27 RX ADMIN — FLECAINIDE ACETATE 100 MG: 100 TABLET ORAL at 10:21

## 2022-03-27 RX ADMIN — FLECAINIDE ACETATE 100 MG: 100 TABLET ORAL at 17:44

## 2022-03-27 RX ADMIN — ACETAMINOPHEN 975 MG: 325 TABLET ORAL at 14:12

## 2022-03-27 RX ADMIN — ACETAMINOPHEN 975 MG: 325 TABLET ORAL at 21:38

## 2022-03-27 RX ADMIN — ATORVASTATIN CALCIUM 40 MG: 40 TABLET, FILM COATED ORAL at 17:43

## 2022-03-27 RX ADMIN — CEPHALEXIN 500 MG: 500 CAPSULE ORAL at 17:43

## 2022-03-27 RX ADMIN — DOCUSATE SODIUM 100 MG: 100 CAPSULE, LIQUID FILLED ORAL at 10:22

## 2022-03-27 RX ADMIN — HYDRALAZINE HYDROCHLORIDE 50 MG: 50 TABLET, FILM COATED ORAL at 06:52

## 2022-03-27 RX ADMIN — AMLODIPINE BESYLATE 5 MG: 5 TABLET ORAL at 10:22

## 2022-03-27 RX ADMIN — STANDARDIZED SENNA CONCENTRATE 8.6 MG: 8.6 TABLET ORAL at 10:22

## 2022-03-27 RX ADMIN — CEPHALEXIN 500 MG: 500 CAPSULE ORAL at 06:52

## 2022-03-27 RX ADMIN — TAMSULOSIN HYDROCHLORIDE 0.4 MG: 0.4 CAPSULE ORAL at 17:46

## 2022-03-27 RX ADMIN — WARFARIN SODIUM 2.5 MG: 2.5 TABLET ORAL at 17:43

## 2022-03-27 RX ADMIN — DULOXETINE HYDROCHLORIDE 60 MG: 60 CAPSULE, DELAYED RELEASE ORAL at 21:38

## 2022-03-27 RX ADMIN — ACETAMINOPHEN 975 MG: 325 TABLET ORAL at 06:52

## 2022-03-27 NOTE — PROGRESS NOTES
03/27/22 0830   Pain Assessment   Pain Assessment Tool 0-10   Pain Score 2   Pain Location/Orientation Orientation: Left; Location: Head;Location: Neck; Location: Shoulder   Pain Onset/Description Onset: Ongoing   Hospital Pain Intervention(s) Medication (See MAR); Rest;Cold applied  (L shoulder - cold pack, also reminded he can ask for voltare)   Restrictions/Precautions   Precautions Fall Risk;Spinal precautions   General   Change In Medical/Functional Status in room privilege with RW   Cognition   Overall Cognitive Status [facility]   Arousal/Participation Alert; Cooperative   Subjective   Subjective pt reported slight fatigue this AM but agreeable to have PT " like I always do even at home, I am tired in the morning"   Roll Left and Right   Type of Assistance Needed Independent   Roll Left and Right CARE Score 6   Sit to Lying   Type of Assistance Needed Independent   Sit to Lying CARE Score 6   Lying to Sitting on Side of Bed   Type of Assistance Needed Independent   Lying to Sitting on Side of Bed CARE Score 6   Sit to Stand   Type of Assistance Needed Independent   Sit to Stand CARE Score 6   Bed-Chair Transfer   Type of Assistance Needed Independent; Adaptive equipment   Comment RW   Chair/Bed-to-Chair Transfer CARE Score 6   Walk 10 Feet   Type of Assistance Needed Independent; Adaptive equipment   Walk 10 Feet CARE Score 6   Walk 50 Feet with Two Turns   Type of Assistance Needed Independent; Adaptive equipment   Walk 50 Feet with Two Turns CARE Score 6   Walk 150 Feet   Type of Assistance Needed Set-up / clean-up; Adaptive equipment   Comment RW x 400'   Walk 150 Feet CARE Score 5   Walking 10 Feet on Uneven Surfaces   Type of Assistance Needed Set-up / clean-up; Adaptive equipment   Comment floor mat with RW   Walking 10 Feet on Uneven Surfaces CARE Score 5   Curb or Single Stair   Style negotiated Single stair   Type of Assistance Needed Supervision; Adaptive equipment;Verbal cues   1 Step (Curb) CARE Score 4   4 Steps   Type of Assistance Needed Supervision;Verbal cues; Adaptive equipment   4 Steps CARE Score 4   12 Steps   Type of Assistance Needed Supervision;Verbal cues; Adaptive equipment   Comment step through pattern bilat rails  VC to ascend with L LE on last step after pt reported slight difficulty on last step after leading with R LE which is his weaker extremity    12 Steps CARE Score 4   Picking Up Object   Type of Assistance Needed Independent; Adaptive equipment   Comment reacher with walker support picked up marker and pen off the floor in hallway    Picking Up Object CARE Score 6   Therapeutic Interventions   Flexibility gentle b/l hamstring and gastroc stretching    Neuromuscular Re-Education repeated gait training with and without SPC x 200' at a time including walking over floor mat or cone weaving, repeated walking fwd/bwd with SPC for light support   Equipment Use   NuStep L4 x 15 mins all ext , SPM> 80   Assessment   Treatment Assessment Pt initially engaged in reviewing in room mobilities using a RW including furniture set up to optimize safety before pt was given in room privilege for he was not progressed by OT yesterday PM due to time constraints with hands on FT  Pt then engaged in strengthening exercises, NPP/NMR for balance and gait training using a SPC and without AD  Trendelenburg gait and inc path deviations more pronounced when mobilizing without an AD  Also reported onset of pulling sensation on L neck/shoulder during mobility training using SPC or no AD but tolerated overall tx  PT will reassess all care score mobilities tomorrow in preparation for home d/c on 3/29/22 as well as cont with NPP/NMR training  Barriers to Discharge Inaccessible home environment;Decreased caregiver support   Plan   Treatment/Interventions Functional transfer training;LE strengthening/ROM; Therapeutic exercise; Endurance training;Gait training;Spoke to nursing;OT   Progress Progressing toward goals   Recommendation PT Discharge Recommendation Home with outpatient rehabilitation   Equipment Recommended   (has a personal walker and SPC )   PT Equipment ordered   (advised pt if can borrow RW from family/friends to use upsta)   PT Therapy Minutes   PT Time In 0830   PT Time Out 1000   PT Total Time (minutes) 90   PT Mode of treatment - Individual (minutes) 90   PT Mode of treatment - Concurrent (minutes) 0   PT Mode of treatment - Group (minutes) 0   PT Mode of treatment - Co-treat (minutes) 0   PT Mode of Treatment - Total time(minutes) 90 minutes   PT Cumulative Minutes 754

## 2022-03-27 NOTE — PROGRESS NOTES
Internal Medicine Progress Note  Patient: Jef Mitchell  Age/sex: 71 y o  male  Medical Record #: [id number]      ASSESSMENT/PLAN: (Interval History)  Jef Mitchell is seen and examined and management for following issues:    S/p anterior cervical discectomy and fixation fusion C5-6 and C6-7, posterior cervical decompression and instrumented fusion C3-T1  · Aspen collar at all times  · S/p Decadron   · Pain control and therapy per PMR  · Pt continues to have bleeding from the distal portion of the posterior incision with bruising  · Pt with areas of skin breakdown at the staples at the distal portion of the incision  · Keflex started 3/24  · Appreciate wound nurse reccs to use maxsorb and allevyn bid  · Cont coumadin 2/2 Factor V     PAF  · INR 2 64  · Pt takes Coumadin 5mg Wed and Sat and 2 5mg Mon, Tues, Thurs, Fri and Sun  · Cont coumadin 5mg on Wednesdays only and 2 5 mg all other days   · INR has remained therapeutic  · Repeat INR monday     HTN  · Has been elevated during hospitalization possible due to steroids  · Continue amlodipine 5mg 2x daily,  Toprol XL 100mg daily  · BP starting to improve likely d/t steroids being completed  · Dc hydralazine 3/25  · BP stable      Leiden Factor V  · Coumadin as above  Hyponatremia  · Na improving - 134  · Will continue to monitor  DC planning: TBD  The above assessment and plan was reviewed and updated as determined by my evaluation of the patient on 3/27/2022      Labs:   Results from last 7 days   Lab Units 03/24/22  0706 03/21/22  0614   WBC Thousand/uL 8 61 9 22   HEMOGLOBIN g/dL 11 0* 12 1   HEMATOCRIT % 30 8* 33 7*   PLATELETS Thousands/uL 266 245     Results from last 7 days   Lab Units 03/24/22  0706 03/21/22  0614   SODIUM mmol/L 134* 132*   POTASSIUM mmol/L 3 6 3 7   CHLORIDE mmol/L 99* 97*   CO2 mmol/L 30 27   BUN mg/dL 14 13   CREATININE mg/dL 0 65 0 60   CALCIUM mg/dL 9 3 9 3         Results from last 7 days   Lab Units 03/24/22  0706 03/22/22  0603   INR  2 64* 2 68*     Results from last 7 days   Lab Units 03/25/22  0633 03/21/22  0926 03/21/22  0625   POC GLUCOSE mg/dl 130 171* 108       Review of Scheduled Meds:  Current Facility-Administered Medications   Medication Dose Route Frequency Provider Last Rate    acetaminophen  975 mg Oral [facility] Jenaro Poe MD      amLODIPine  5 mg Oral BID Jenaro Poe MD      atorvastatin  40 mg Oral QPM Jenaro Poe MD      bisacodyl  10 mg Rectal Daily PRN Jenaro Poe MD      calcium carbonate  1,000 mg Oral Daily PRN Jenaro Poe MD      cephalexin  500 mg Oral Q6H [address] Jenaro Poe MD      docusate sodium  100 mg Oral BID Jenaro Poe MD      DULoxetine  60 mg Oral HS Jenaro Poe MD      flecainide  100 mg Oral BID Jenaro Poe MD      gabapentin  100 mg Oral Daily Jenaro Poe MD      gabapentin  300 mg Oral HS Jenaro Poe MD      hydrALAZINE  50 mg Oral [facility] DEEPTHI De La Cruz      lidocaine   Topical 4x Daily PRN Jenaro Poe MD      methocarbamol  500 mg Oral Q6H PRN Jenaro Poe MD      metoprolol succinate  100 mg Oral Daily Jenaro Poe MD      oxyCODONE  2 5 mg Oral Q4H PRN Jenaro Poe MD      oxyCODONE  5 mg Oral Q4H PRN Jenaro Poe MD      pantoprazole  40 mg Oral Early Morning Jenaro Poe MD      polyethylene glycol  17 g Oral Daily PRN Jenaro Poe MD      senna  1 tablet Oral Daily Jenaro Poe MD      tamsulosin  0 4 mg Oral Daily With Shree Grewal MD      warfarin  2 5 mg Oral Once per day on Sun Mon Tue Thu Fri Sat DEEPTHI De La Cruz      [START ON 3/30/2022] warfarin  5 mg Oral Once per day on Wed DEEPTHI Cormier         Subjective/ HPI: Patient seen and examined  Patients overnight issues or events were reviewed with nursing or staff during rounds or morning huddle session  New or overnight issues include the following:     Pt seen in his room  Dressing change performed   THe lower portion of the incision does not look as red, it also appears that the macerated skin around the staples is now sloughing  I do not feel that this is pustulous or infectious at this point  I d/w nursing regarding trying to keep that incision as dry as possible       ROS:   A 10 point ROS was performed; negative except as noted above  Imaging:     No orders to display       *Labs /Radiology studies Reviewed  *Medications  reviewed and reconciled as needed  *Please refer to order section for additional ordered labs studies  *Case discussed with primary attending during morning huddle case rounds    Physical Examination:  Vitals:   Vitals:    03/26/22 1357 03/26/22 1500 03/26/22 2107 03/27/22 0648   BP: 154/71 136/72 137/62 131/63   BP Location: Left arm Left arm Right arm Left arm   Pulse:  88 91 90   Resp:  17 18 18   Temp:  98 2 °F (36 8 °C)  98 6 °F (37 °C)   TempSrc:  Oral Oral Oral   SpO2:  96%  95%   Weight:       Height:         GEN: No apparent distress, interactive  NEURO: Alert and oriented x3  HEENT: Pupils are equal and reactive, EOMI, mucous membranes are moist, face symmetrical; aspen collar in place  CV: S1 S2 regular, no MRG, no peripheral edema noted  RESP: Lungs are clear bilaterally, no wheezes, rales or rhonchi noted, on room air, respirations easy and non labored  GI: Flat, soft non tender, non distended; +BS x4  : Voiding without difficulty  MUSC: Moves all extremities  SKIN: pink, warm and dry, normal turgor, dressing in place      The above physical exam was reviewed and updated as determined by my evaluation of the patient on 3/27/2022  Invasive Devices  Report    None                    VTE Pharmacologic Prophylaxis: Warfarin (Coumadin)  Code Status: Level 3 - DNAR and DNI  Current Length of Stay: 9 day(s)      Total time spent:  30 minutes with more than 50% spent counseling/coordinating care   Counseling includes discussion with patient re: progress  and discussion with patient of his/her current medical state/information  Coordination of patient's care was performed in conjunction with primary service  Time invested included review of patient's labs, vitals, and management of their comorbidities with continued monitoring  In addition, this patient was discussed with medical team including physician and advanced extenders  The care of the patient was extensively discussed and appropriate treatment plan was formulated unique for this patient  ** Please Note:  voice to text software may have been used in the creation of this document   Although proof errors in transcription or interpretation are a potential of such software**

## 2022-03-27 NOTE — PROGRESS NOTES
03/27/22 1230   Pain Assessment   Pain Assessment Tool 0-10   Pain Score 4   Pain Location/Orientation Location: Head   Restrictions/Precautions   Precautions Fall Risk;Spinal precautions   Braces or Orthoses C/S Collar   Lifestyle   Autonomy 'I am ready to go home"   Shower/Bathe Self   Type of Assistance Needed Supervision   Physical Assistance Level No physical assistance   Comment simple SB per pt request   Shower/Bathe Self CARE Score 4   Upper Body Dressing   Type of Assistance Needed Physical assistance   Physical Assistance Level 26%-50%   Comment A for Collar management  Dressing required change 2* drainage with nursing present  Upper Body Dressing CARE Score 3   Sit to Stand   Type of Assistance Needed Independent   Physical Assistance Level No physical assistance   Comment RW   Sit to Stand CARE Score 6   Bed-Chair Transfer   Type of Assistance Needed Independent; Adaptive equipment   Physical Assistance Level No physical assistance   Comment RW   Chair/Bed-to-Chair Transfer CARE Score 6   Toileting Hygiene   Type of Assistance Needed Independent   Physical Assistance Level No physical assistance   Comment seated for BM and urinate  Able to complete hygiene and CM at indpe level   [facility] Score 6   Toilet Transfer   Type of Assistance Needed Independent   Physical Assistance Level No physical assistance   Comment standard toilet   Toilet Transfer CARE Score 6   Meal Prep   Meal Prep Level Walker   Meal Prep Level of Assistance Distant supervision   Meal Preparation Pt engaged in meal prep task using stovetop to make mac and cheese  Pt completed entire task at distant supervision level  Pt did turn off burner when task was completed without VC  No safety concerns noted at this time     Kitchen Mobility   Kitchen-Mobility Level Walker   Kitchen Activity Retrieve items;Transport items   Kitchen Mobility Comments Pt engaged in kitchen mob task of obtaining items from cabinet, drawers, and fridge for meal prep task  Pt used countertop as support when transporting items and used RW appropriately  No safety concerns  Pt completed entire task at distant supervision level  Cognition   Overall Cognitive Status WFL   Arousal/Participation Alert; Cooperative   Attention Within functional limits   Orientation Level Oriented X4   Memory Decreased short term memory   Following Commands Follows multistep commands without difficulty   Additional Activities   Additional Activities Comments Completed functional mobility from 4th floor to 9th floor at indep level with RW    Activity Tolerance   Activity Tolerance Patient tolerated treatment well   Assessment   Treatment Assessment Pt engaged in skilled OT session with focus on: ADL retraining, collar management, IADL management, ongoing RW education and prep for D/C  Pt tolerated session well, completing entire meal prep task at DS, with only VC 2* never cooking boxed mac and cheese before  Pt was edcuated on RW trays, bags, and baskets however declined at this time stating he will have his wife at all times  Plan for wife to be present tomorrow to meet with nursing, therapy, and MD to address all concerns since she will not be present for D/C on Tuesday  Pt will benefit from continued skilled OT to maximize indep and safety with ADLs and functional transfers  Continue POC with focus on: ADL retraining, functional transfers, RW, compensation techniques, education, UE strengthening/endurance training  Please complete HEP prior to D/C   Prognosis Good   Problem List Decreased strength;Decreased range of motion;Decreased mobility; Decreased coordination; Impaired balance;Pain;Orthopedic restrictions;Decreased endurance   Barriers to Discharge Inaccessible home environment;Decreased caregiver support   Plan   Treatment/Interventions ADL retraining;Functional transfer training; Therapeutic exercise; Endurance training;Patient/family training;Equipment eval/education; Compensatory technique education   Progress Progressing toward goals   Recommendation   OT Discharge Recommendation   (no OT needs)   OT Therapy Minutes   OT Time In 1230   OT Time Out 1400   OT Total Time (minutes) 90   OT Mode of treatment - Individual (minutes) 90   OT Mode of treatment - Concurrent (minutes) 0   OT Mode of treatment - Group (minutes) 0   OT Mode of treatment - Co-treat (minutes) 0   OT Mode of Treatment - Total time(minutes) 90 minutes   OT Cumulative Minutes 921

## 2022-03-27 NOTE — PLAN OF CARE
Reviewed    Problem: PAIN - ADULT  Goal: Verbalizes/displays adequate comfort level or baseline comfort level  Description: Interventions:  - Encourage patient to monitor pain and request assistance  - Assess pain using appropriate pain scale  - Administer analgesics based on type and severity of pain and evaluate response  - Implement non-pharmacological measures as appropriate and evaluate response  - Consider cultural and social influences on pain and pain management  - Notify physician/advanced practitioner if interventions unsuccessful or patient reports new pain  Outcome: Progressing     Problem: INFECTION - ADULT  Goal: Absence or prevention of progression during hospitalization  Description: INTERVENTIONS:  - Assess and monitor for signs and symptoms of infection  - Monitor lab/diagnostic results  - Monitor all insertion sites, i e  indwelling lines, tubes, and drains  - Monitor endotracheal if appropriate and nasal secretions for changes in amount and color  - Presto appropriate cooling/warming therapies per order  - Administer medications as ordered  - Instruct and encourage patient and family to use good hand hygiene technique  - Identify and instruct in appropriate isolation precautions for identified infection/condition  Outcome: Progressing  Goal: Absence of fever/infection during neutropenic period  Description: INTERVENTIONS:  - Monitor WBC    Outcome: Progressing     Problem: SAFETY ADULT  Goal: Patient will remain free of falls  Description: INTERVENTIONS:  - Educate patient/family on patient safety including physical limitations  - Instruct patient to call for assistance with activity   - Consult OT/PT to assist with strengthening/mobility   - Keep Call bell within reach  - Keep bed low and locked with side rails adjusted as appropriate  - Keep care items and personal belongings within reach  - Initiate and maintain comfort rounds  - Make Fall Risk Sign visible to staff  - Offer Toileting every 4 Hours, in advance of need  - Apply yellow socks and bracelet for high fall risk patients  - Consider moving patient to room near nurses station  Outcome: Progressing  Goal: Maintain or return to baseline ADL function  Description: INTERVENTIONS:  -  Assess patient's ability to carry out ADLs; assess patient's baseline for ADL function and identify physical deficits which impact ability to perform ADLs (bathing, care of mouth/teeth, toileting, grooming, dressing, etc )  - Assess/evaluate cause of self-care deficits   - Assess range of motion  - Assess patient's mobility; develop plan if impaired  - Assess patient's need for assistive devices and provide as appropriate  - Encourage maximum independence but intervene and supervise when necessary  - Involve family in performance of ADLs  - Assess for home care needs following discharge   - Consider OT consult to assist with ADL evaluation and planning for discharge  - Provide patient education as appropriate  Outcome: Progressing  Goal: Maintains/Returns to pre admission functional level  Description: INTERVENTIONS:  - Perform BMAT or MOVE assessment daily    - Set and communicate daily mobility goal to care team and patient/family/caregiver     - Collaborate with rehabilitation services on mobility goals if consulted  - Out of bed for toileting  - Record patient progress and toleration of activity level   Outcome: Progressing     Problem: DISCHARGE PLANNING  Goal: Discharge to home or other facility with appropriate resources  Description: INTERVENTIONS:  - Identify barriers to discharge w/patient and caregiver  - Arrange for needed discharge resources and transportation as appropriate  - Identify discharge learning needs (meds, wound care, etc )  - Arrange for interpretive services to assist at discharge as needed  - Refer to Case Management Department for coordinating discharge planning if the patient needs post-hospital services based on physician/advanced practitioner order or complex needs related to functional status, cognitive ability, or social support system  Outcome: Progressing     Problem: Knowledge Deficit  Goal: Patient/family/caregiver demonstrates understanding of disease process, treatment plan, medications, and discharge instructions  Description: Complete learning assessment and assess knowledge base    Interventions:  - Provide teaching at level of understanding  - Provide teaching via preferred learning methods  Outcome: Progressing     Problem: Potential for Falls  Goal: Patient will remain free of falls  Description: INTERVENTIONS:  - Educate patient/family on patient safety including physical limitations  - Instruct patient to call for assistance with activity   - Consult OT/PT to assist with strengthening/mobility   - Keep Call bell within reach  - Keep bed low and locked with side rails adjusted as appropriate  - Keep care items and personal belongings within reach  - Initiate and maintain comfort rounds  - Make Fall Risk Sign visible to staff  - Offer Toileting every 4 Hours, in advance of need  - Apply yellow socks and bracelet for high fall risk patients  - Consider moving patient to room near nurses station  Outcome: Progressing

## 2022-03-28 ENCOUNTER — TELEPHONE (OUTPATIENT)
Dept: NEUROSURGERY | Facility: CLINIC | Age: 72
End: 2022-03-28

## 2022-03-28 PROBLEM — Z91.89 AT RISK FOR VENOUS THROMBOEMBOLISM (VTE): Status: ACTIVE | Noted: 2022-03-28

## 2022-03-28 PROBLEM — D64.9 ANEMIA: Status: ACTIVE | Noted: 2022-03-28

## 2022-03-28 LAB
ANION GAP SERPL CALCULATED.3IONS-SCNC: 7 MMOL/L (ref 4–13)
BASOPHILS # BLD AUTO: 0.02 THOUSANDS/ΜL (ref 0–0.1)
BASOPHILS NFR BLD AUTO: 0 % (ref 0–1)
BUN SERPL-MCNC: 14 MG/DL (ref 5–25)
CALCIUM SERPL-MCNC: 9.1 MG/DL (ref 8.3–10.1)
CHLORIDE SERPL-SCNC: 97 MMOL/L (ref 100–108)
CO2 SERPL-SCNC: 31 MMOL/L (ref 21–32)
CREAT SERPL-MCNC: 0.65 MG/DL (ref 0.6–1.3)
EOSINOPHIL # BLD AUTO: 0.25 THOUSAND/ΜL (ref 0–0.61)
EOSINOPHIL NFR BLD AUTO: 3 % (ref 0–6)
ERYTHROCYTE [DISTWIDTH] IN BLOOD BY AUTOMATED COUNT: 13.2 % (ref 11.6–15.1)
GFR SERPL CREATININE-BSD FRML MDRD: 97 ML/MIN/1.73SQ M
GLUCOSE SERPL-MCNC: 107 MG/DL (ref 65–140)
HCT VFR BLD AUTO: 29.8 % (ref 36.5–49.3)
HGB BLD-MCNC: 9.5 G/DL (ref 12–17)
IMM GRANULOCYTES # BLD AUTO: 0.03 THOUSAND/UL (ref 0–0.2)
IMM GRANULOCYTES NFR BLD AUTO: 0 % (ref 0–2)
INR PPP: 3.15 (ref 0.84–1.19)
LYMPHOCYTES # BLD AUTO: 0.8 THOUSANDS/ΜL (ref 0.6–4.47)
LYMPHOCYTES NFR BLD AUTO: 9 % (ref 14–44)
MCH RBC QN AUTO: 30 PG (ref 26.8–34.3)
MCHC RBC AUTO-ENTMCNC: 31.9 G/DL (ref 31.4–37.4)
MCV RBC AUTO: 94 FL (ref 82–98)
MONOCYTES # BLD AUTO: 0.37 THOUSAND/ΜL (ref 0.17–1.22)
MONOCYTES NFR BLD AUTO: 4 % (ref 4–12)
NEUTROPHILS # BLD AUTO: 7.29 THOUSANDS/ΜL (ref 1.85–7.62)
NEUTS SEG NFR BLD AUTO: 84 % (ref 43–75)
NRBC BLD AUTO-RTO: 0 /100 WBCS
PLATELET # BLD AUTO: 273 THOUSANDS/UL (ref 149–390)
PMV BLD AUTO: 9.3 FL (ref 8.9–12.7)
POTASSIUM SERPL-SCNC: 3.1 MMOL/L (ref 3.5–5.3)
PROTHROMBIN TIME: 30.8 SECONDS (ref 11.6–14.5)
RBC # BLD AUTO: 3.17 MILLION/UL (ref 3.88–5.62)
SODIUM SERPL-SCNC: 135 MMOL/L (ref 136–145)
WBC # BLD AUTO: 8.76 THOUSAND/UL (ref 4.31–10.16)

## 2022-03-28 PROCEDURE — 80048 BASIC METABOLIC PNL TOTAL CA: CPT | Performed by: PHYSICIAN ASSISTANT

## 2022-03-28 PROCEDURE — 99233 SBSQ HOSP IP/OBS HIGH 50: CPT

## 2022-03-28 PROCEDURE — 97110 THERAPEUTIC EXERCISES: CPT

## 2022-03-28 PROCEDURE — 97530 THERAPEUTIC ACTIVITIES: CPT

## 2022-03-28 PROCEDURE — 85610 PROTHROMBIN TIME: CPT | Performed by: NURSE PRACTITIONER

## 2022-03-28 PROCEDURE — 97535 SELF CARE MNGMENT TRAINING: CPT

## 2022-03-28 PROCEDURE — 99232 SBSQ HOSP IP/OBS MODERATE 35: CPT | Performed by: INTERNAL MEDICINE

## 2022-03-28 PROCEDURE — 85025 COMPLETE CBC W/AUTO DIFF WBC: CPT | Performed by: PHYSICIAN ASSISTANT

## 2022-03-28 RX ORDER — WARFARIN SODIUM 2.5 MG/1
2.5 TABLET ORAL
Status: DISCONTINUED | OUTPATIENT
Start: 2022-03-29 | End: 2022-03-29

## 2022-03-28 RX ORDER — DOCUSATE SODIUM 100 MG/1
100 CAPSULE, LIQUID FILLED ORAL 2 TIMES DAILY
Qty: 60 CAPSULE | Refills: 0 | Status: SHIPPED | OUTPATIENT
Start: 2022-03-29 | End: 2022-06-08 | Stop reason: ALTCHOICE

## 2022-03-28 RX ORDER — POTASSIUM CHLORIDE 20 MEQ/1
40 TABLET, EXTENDED RELEASE ORAL ONCE
Status: COMPLETED | OUTPATIENT
Start: 2022-03-28 | End: 2022-03-28

## 2022-03-28 RX ORDER — GABAPENTIN 100 MG/1
100 CAPSULE ORAL DAILY
Qty: 30 CAPSULE | Refills: 0 | Status: SHIPPED | OUTPATIENT
Start: 2022-03-29 | End: 2022-06-04

## 2022-03-28 RX ORDER — AMLODIPINE BESYLATE 5 MG/1
5 TABLET ORAL 2 TIMES DAILY
Qty: 60 TABLET | Refills: 0 | Status: SHIPPED | OUTPATIENT
Start: 2022-03-28 | End: 2022-03-29 | Stop reason: HOSPADM

## 2022-03-28 RX ORDER — ACETAMINOPHEN 500 MG
1000 TABLET ORAL 3 TIMES DAILY PRN
Start: 2022-03-28 | End: 2022-04-15 | Stop reason: HOSPADM

## 2022-03-28 RX ORDER — OXYCODONE HYDROCHLORIDE 5 MG/1
TABLET ORAL
Qty: 15 TABLET | Refills: 0 | Status: SHIPPED | OUTPATIENT
Start: 2022-03-28 | End: 2022-04-15 | Stop reason: HOSPADM

## 2022-03-28 RX ORDER — CEPHALEXIN 500 MG/1
500 CAPSULE ORAL EVERY 6 HOURS SCHEDULED
Qty: 16 CAPSULE | Refills: 0 | Status: SHIPPED | OUTPATIENT
Start: 2022-03-29 | End: 2022-04-15 | Stop reason: HOSPADM

## 2022-03-28 RX ORDER — GABAPENTIN 300 MG/1
300 CAPSULE ORAL
Qty: 30 CAPSULE | Refills: 0 | Status: SHIPPED | OUTPATIENT
Start: 2022-03-28 | End: 2022-06-10 | Stop reason: SDUPTHER

## 2022-03-28 RX ORDER — SENNOSIDES 8.6 MG
8.6 TABLET ORAL DAILY
Qty: 30 TABLET | Refills: 0 | Status: ON HOLD | OUTPATIENT
Start: 2022-03-29 | End: 2022-04-12 | Stop reason: CLARIF

## 2022-03-28 RX ADMIN — DULOXETINE HYDROCHLORIDE 60 MG: 60 CAPSULE, DELAYED RELEASE ORAL at 21:08

## 2022-03-28 RX ADMIN — TAMSULOSIN HYDROCHLORIDE 0.4 MG: 0.4 CAPSULE ORAL at 17:03

## 2022-03-28 RX ADMIN — CEPHALEXIN 500 MG: 500 CAPSULE ORAL at 17:03

## 2022-03-28 RX ADMIN — DOCUSATE SODIUM 100 MG: 100 CAPSULE, LIQUID FILLED ORAL at 08:09

## 2022-03-28 RX ADMIN — AMLODIPINE BESYLATE 5 MG: 5 TABLET ORAL at 08:10

## 2022-03-28 RX ADMIN — GABAPENTIN 300 MG: 300 CAPSULE ORAL at 21:08

## 2022-03-28 RX ADMIN — CEPHALEXIN 500 MG: 500 CAPSULE ORAL at 12:34

## 2022-03-28 RX ADMIN — ACETAMINOPHEN 975 MG: 325 TABLET ORAL at 05:01

## 2022-03-28 RX ADMIN — GABAPENTIN 100 MG: 100 CAPSULE ORAL at 08:10

## 2022-03-28 RX ADMIN — DOCUSATE SODIUM 100 MG: 100 CAPSULE, LIQUID FILLED ORAL at 17:03

## 2022-03-28 RX ADMIN — HYDRALAZINE HYDROCHLORIDE 50 MG: 50 TABLET, FILM COATED ORAL at 05:01

## 2022-03-28 RX ADMIN — ACETAMINOPHEN 975 MG: 325 TABLET ORAL at 13:36

## 2022-03-28 RX ADMIN — METOPROLOL SUCCINATE 100 MG: 100 TABLET, EXTENDED RELEASE ORAL at 08:09

## 2022-03-28 RX ADMIN — FLECAINIDE ACETATE 100 MG: 100 TABLET ORAL at 08:09

## 2022-03-28 RX ADMIN — ATORVASTATIN CALCIUM 40 MG: 40 TABLET, FILM COATED ORAL at 17:03

## 2022-03-28 RX ADMIN — STANDARDIZED SENNA CONCENTRATE 8.6 MG: 8.6 TABLET ORAL at 08:10

## 2022-03-28 RX ADMIN — POTASSIUM CHLORIDE 40 MEQ: 1500 TABLET, EXTENDED RELEASE ORAL at 10:05

## 2022-03-28 RX ADMIN — AMLODIPINE BESYLATE 5 MG: 5 TABLET ORAL at 21:08

## 2022-03-28 RX ADMIN — OXYCODONE HYDROCHLORIDE 5 MG: 5 TABLET ORAL at 09:24

## 2022-03-28 RX ADMIN — ACETAMINOPHEN 975 MG: 325 TABLET ORAL at 21:08

## 2022-03-28 RX ADMIN — FLECAINIDE ACETATE 100 MG: 100 TABLET ORAL at 17:03

## 2022-03-28 RX ADMIN — PANTOPRAZOLE SODIUM 40 MG: 40 TABLET, DELAYED RELEASE ORAL at 05:01

## 2022-03-28 RX ADMIN — CEPHALEXIN 500 MG: 500 CAPSULE ORAL at 05:01

## 2022-03-28 NOTE — PROGRESS NOTES
03/28/22 0900   Pain Assessment   Pain Assessment Tool 0-10   Pain Score 8   Pain Location/Orientation Orientation: Left; Location: Neck   Restrictions/Precautions   Precautions Fall Risk;Spinal precautions   Braces or Orthoses C/S Collar   Eating   Type of Assistance Needed Independent   Physical Assistance Level No physical assistance   Eating CARE Score 6   Oral Hygiene   Type of Assistance Needed Independent   Physical Assistance Level No physical assistance   Comment I in stance at sink   Oral Hygiene CARE Score 6   Shower/Bathe Self   Type of Assistance Needed Set-up / clean-up   Physical Assistance Level No physical assistance   Comment PIERRE to complete shower with marybeth lindsay  Recommending supervision for inital shower upon D/C home  Required PIERRE to cover inscisions with tegaderm   Shower/Bathe Self CARE Score 5   Tub/Shower Transfer   Findings SPT with RW, completed at S level   Upper Body Dressing   Type of Assistance Needed Physical assistance   Physical Assistance Level 26%-50%   Comment A for collar management  New pads ordered   Upper Body Dressing CARE Score 3   Lower Body Dressing   Type of Assistance Needed Independent   Physical Assistance Level No physical assistance   Comment indep for threading and Cm   Lower Body Dressing CARE Score 6   Putting On/Taking Off Footwear   Type of Assistance Needed Independent   Physical Assistance Level No physical assistance   Comment sock and sneakers   Putting On/Taking Off Footwear CARE Score 6   Sit to Stand   Type of Assistance Needed Independent   Physical Assistance Level No physical assistance   Comment RW   Sit to Stand CARE Score 6   Bed-Chair Transfer   Type of Assistance Needed Independent; Adaptive equipment   Physical Assistance Level No physical assistance   Comment RW   Chair/Bed-to-Chair Transfer CARE Score 6   Toileting Hygiene   Type of Assistance Needed Independent   Physical Assistance Level No physical assistance   Ben Delvalle 83 Score 6   Toilet Transfer   Type of Assistance Needed Independent   Physical Assistance Level No physical assistance   Comment standard toilet   Toilet Transfer CARE Score 6   Cognition   Overall Cognitive Status WFL   Arousal/Participation Alert; Cooperative   Attention Within functional limits   Orientation Level Oriented X4   Memory Decreased short term memory   Following Commands Follows multistep commands without difficulty   Activity Tolerance   Activity Tolerance Patient tolerated treatment well   Assessment   Treatment Assessment Pt engaged in skilled OT session with focus on: completing ADL (shower), functional transfers, and standing tolerance/balance  Pt tolerated session well, completing most of ADL at indep level  Pt continues to require A for C-Collar management but does well maintaining neck position when changing from seirra to Rojas  Nursing present to complete dressing change  Pt continues to demo good ability to follow all spinal precautions throughout ADLs and functional transfers  Plan to touch base withSO today to review recommendations and address any concerns  Plan for D/C home tomorrow with home PT and RN and no additional OT needs at this time  Not DME recommended  Prognosis Good   Problem List Decreased strength;Decreased range of motion;Decreased mobility; Decreased coordination; Impaired balance;Pain;Orthopedic restrictions;Decreased endurance   Barriers to Discharge Inaccessible home environment;Decreased caregiver support   Recommendation   OT Discharge Recommendation Home with home health rehabilitation  (PT and RN)   OT Therapy Minutes   OT Time In 0900   OT Time Out 1030   OT Total Time (minutes) 90   OT Mode of treatment - Individual (minutes) 90   OT Mode of treatment - Concurrent (minutes) 0   OT Mode of treatment - Group (minutes) 0   OT Mode of treatment - Co-treat (minutes) 0   OT Mode of Treatment - Total time(minutes) 90 minutes   OT Cumulative Minutes 321

## 2022-03-28 NOTE — PROGRESS NOTES
Internal Medicine Progress Note  Patient: Concepcion Niño  Age/sex: 71 y o  male  Medical Record #: [id number]      ASSESSMENT/PLAN: (Interval History)  Concepcion Niño is seen and examined and management for following issues:    S/p anterior cervical discectomy and fixation fusion C5-6 and C6-7, posterior cervical decompression and instrumented fusion C3-T1  · Aspen collar at all times  · S/p Decadron   · Pain control and therapy per PMR  · Pt continues to have bleeding from the distal portion of the posterior incision with bruising  · Pt with areas of skin breakdown at the staples at the distal portion of the incision  · Keflex started 3/24  · Appreciate wound nurse reccs to use maxsorb and allevyn bid  · Cont coumadin 2/2 Factor V - holding dose tonight due to INR 3 15  PAF  · INR 2 64  · Pt takes Coumadin 5mg Wed and Sat and 2 5mg Mon, Tues, Thurs, Fri and Sun  · Cont coumadin 5mg on Wednesdays only and 2 5 mg all other days   · INR 3 15  Will hold Coumadin tonight  HTN  · Has been elevated during hospitalization possible due to steroids  · Continue amlodipine 5mg 2x daily and Toprol XL 100mg daily  · BP starting to improve likely d/t steroids being completed  · Dc hydralazine  · BP stable      Leiden Factor V  · Coumadin as above  Hyponatremia  · Na improving - 135  · Will continue to monitor  Hypokalemia  · K 3 1  · Will replete with KCl 40meq  · Repeat BMP in AM     Anemia  · Hgb down to 9 5  · Has been slowly decreasing during pt's stay  · PMR discussing possible imaging  · Repeat in AM       DC planning: TBD  The above assessment and plan was reviewed and updated as determined by my evaluation of the patient on 3/28/2022      Labs:   Results from last 7 days   Lab Units 03/28/22  0501 03/24/22  0706   WBC Thousand/uL 8 76 8 61   HEMOGLOBIN g/dL 9 5* 11 0*   HEMATOCRIT % 29 8* 30 8*   PLATELETS Thousands/uL 273 266     Results from last 7 days   Lab Units 03/28/22  0501 03/24/22  0706   SODIUM mmol/L 135* 134*   POTASSIUM mmol/L 3 1* 3 6   CHLORIDE mmol/L 97* 99*   CO2 mmol/L 31 30   BUN mg/dL 14 14   CREATININE mg/dL 0 65 0 65   CALCIUM mg/dL 9 1 9 3         Results from last 7 days   Lab Units 03/28/22  0501 03/24/22  0706   INR  3 15* 2 64*     Results from last 7 days   Lab Units 03/25/22  0633 03/21/22  0926   POC GLUCOSE mg/dl 130 171*       Review of Scheduled Meds:  Current Facility-Administered Medications   Medication Dose Route Frequency Provider Last Rate    acetaminophen  975 mg Oral [facility] Giancarlo Evans MD      amLODIPine  5 mg Oral BID Giancarlo Evans MD      atorvastatin  40 mg Oral QPM Giancarlo Evans MD      bisacodyl  10 mg Rectal Daily PRN Giancarlo Evans MD      calcium carbonate  1,000 mg Oral Daily PRN Giancarlo Evans MD      cephalexin  500 mg Oral Q6H [address] Giancarlo Evans MD      docusate sodium  100 mg Oral BID Giancarlo Evans MD      DULoxetine  60 mg Oral HS Giancarlo Evans MD      flecainide  100 mg Oral BID Giancarlo Evans MD      gabapentin  100 mg Oral Daily Giancarlo Evans MD      gabapentin  300 mg Oral HS Giancarlo Evans MD      hydrALAZINE  50 mg Oral [facility] DEEPTHI Ryder      lidocaine   Topical 4x Daily PRN Giancarlo Evans MD      methocarbamol  500 mg Oral Q6H PRN Giancarlo Evans MD      metoprolol succinate  100 mg Oral Daily Giancarlo Evans MD      oxyCODONE  2 5 mg Oral Q4H PRN Giancarlo Evans MD      oxyCODONE  5 mg Oral Q4H PRN Giancarlo Evans MD      pantoprazole  40 mg Oral Early Morning Giancarlo Evans MD      polyethylene glycol  17 g Oral Daily PRN Giancarlo Evans MD      senna  1 tablet Oral Daily Giancarlo Evans MD      tamsulosin  0 4 mg Oral Daily With Aminta Baumgarten, MD      warfarin  2 5 mg Oral Once per day on Sun Mon Tue Thu Fri Sat DEEPTHI Ryder      [START ON 3/30/2022] warfarin  5 mg Oral Once per day on Wed DEEPTHI Cormier         Subjective/ HPI: Patient seen and examined   Patients overnight issues or events were reviewed with nursing or staff during rounds or morning huddle session  New or overnight issues include the following:     Pt seen in his room during dressing change  Pt continues to have serosanguinous drainage from the distal portion of the posterior incision  There is less erythema of the incision  No purulence seen  Pt denies any current complaints  ROS:   A 10 point ROS was performed; negative except as noted above  Imaging:     No orders to display       *Labs /Radiology studies Reviewed  *Medications  reviewed and reconciled as needed  *Please refer to order section for additional ordered labs studies  *Case discussed with primary attending during morning huddle case rounds    Physical Examination:  Vitals:   Vitals:    03/27/22 2140 03/28/22 0300 03/28/22 0600 03/28/22 0708   BP: 132/66 129/60  121/59   BP Location: Left arm   Left arm   Pulse: 75 71  74   Resp: 17   18   Temp: 98 7 °F (37 1 °C)   98 4 °F (36 9 °C)   TempSrc: Oral   Oral   SpO2: 93%   94%   Weight:   103 kg (227 lb 3 2 oz)    Height:         GEN: No apparent distress, interactive  NEURO: Alert and oriented x3  HEENT: Pupils are equal and reactive, EOMI, mucous membranes are moist, face symmetrical; aspen collar in place  CV: S1 S2 regular, no MRG, no peripheral edema noted  RESP: Lungs are clear bilaterally, no wheezes, rales or rhonchi noted, on room air, respirations easy and non labored  GI: Flat, soft non tender, non distended; +BS x4  : Voiding without difficulty  MUSC: Moves all extremities  SKIN: pink, warm and dry, normal turgor, Decreasing erythema of incision  Continues with serosanguinous drainage  Staples intact  The above physical exam was reviewed and updated as determined by my evaluation of the patient on 3/28/2022      Invasive Devices  Report    None                    VTE Pharmacologic Prophylaxis: Warfarin (Coumadin)  Code Status: Level 3 - DNAR and DNI  Current Length of Stay: 10 day(s)      Total time spent:  30 minutes with more than 50% spent counseling/coordinating care  Counseling includes discussion with patient re: progress  and discussion with patient of his/her current medical state/information  Coordination of patient's care was performed in conjunction with primary service  Time invested included review of patient's labs, vitals, and management of their comorbidities with continued monitoring  In addition, this patient was discussed with medical team including physician and advanced extenders  The care of the patient was extensively discussed and appropriate treatment plan was formulated unique for this patient  ** Please Note:  voice to text software may have been used in the creation of this document   Although proof errors in transcription or interpretation are a potential of such software**

## 2022-03-28 NOTE — TELEPHONE ENCOUNTER
4/18/22- PT DISCHARGED HOME    4/15/22- PT IN HOSPITAL  PER \[facility]\"", R/S 4/22 POV FOR 2 WKS OUT FROM TODAY 4/15  NEW 2WK POV SCHEDULED 4/29/22  4WK POV SCHEDULED 5/13  6WK POV SCHEDULED 5/24/22  PT WILL NEED CSPINE XRAYS    4/14/22- PT IN HOSPITAL  2WK POV SCHEDULED 4/22/22  6WK POV SCHEDULED 5/24/22  PT WILL NEED CSPINE XRAYS    4/13/22- MORE) PLAN TO D/C HOME ON Friday  CONTINUE TO MONITOR   FOLLOW AS PRIMARY    3/30/22- PT DISCHARGED HOME    3/29/22- [facility]    3/28/22- PT IN HOSPITAL  1WK POV SCHEDULED 3/31/22    3/25/22- Amor Lomeli) S/O W/ A 1WK F/U

## 2022-03-28 NOTE — PROGRESS NOTES
03/28/22 1305   Pain Assessment   Pain Assessment Tool 0-10   Pain Score 8   Pain Location/Orientation Location: Neck   Restrictions/Precautions   Precautions Fall Risk;Spinal precautions   Braces or Orthoses C/S Collar   Cognition   Overall Cognitive Status WFL   Activity Tolerance   Activity Tolerance Patient tolerated treatment well   Assessment   Treatment Assessment Pt and wife present to review recommendations and shower routine  Pt's wife concerned about shower and risk of infection with dressing changes  Reviewed recommendations of tegaderm over inscision to prevent getting wet  Reviewed placement tegaderm  Also reviewed recommendations to shower every 2-3 days  With home RN coming, can organize showers around when they will be present to assist with dressing changes  Plan to D/C home tomorrow with family support and no OT needs      OT Family training done with: Wife, Santhosh Finley   Recommendation   OT Discharge Recommendation Home with home health rehabilitation  (no OT needs)   OT Therapy Minutes   OT Time In 1305   OT Time Out 1330   OT Total Time (minutes) 25   OT Mode of treatment - Individual (minutes) 25   OT Mode of treatment - Concurrent (minutes) 0   OT Mode of treatment - Group (minutes) 0   OT Mode of treatment - Co-treat (minutes) 0   OT Mode of Treatment - Total time(minutes) 25 minutes   OT Cumulative Minutes 222

## 2022-03-28 NOTE — PLAN OF CARE
Problem: INFECTION - ADULT  Goal: Absence or prevention of progression during hospitalization  Description: INTERVENTIONS:  - Assess and monitor for signs and symptoms of infection  - Monitor lab/diagnostic results  - Monitor all insertion sites, i e  indwelling lines, tubes, and drains  - Monitor endotracheal if appropriate and nasal secretions for changes in amount and color  - [place] appropriate cooling/warming therapies per order  - Administer medications as ordered  - Instruct and encourage patient and family to use good hand hygiene technique  - Identify and instruct in appropriate isolation precautions for identified infection/condition  Outcome: Progressing  Goal: Absence of fever/infection during neutropenic period  Description: INTERVENTIONS:  - Monitor WBC    Outcome: Progressing

## 2022-03-28 NOTE — PROGRESS NOTES
Physical Medicine and Rehabilitation Progress Note  Praveen Hill 71 y o  male MRN: [id number]  Unit/Bed#: [facility] 451-01 Encounter: [id number]      Assessment & Plan:     Functional assessment:  Improving        Admit    Recent  Total assist UBD    Significant assist     Mod assist  To hygiene, bathing, LBD UBD   Min assist      Contact Guard  LBD   Supervision   Bathing   Mod Indep/Indep  To hygiene   Transfers Mod assist  Mod indep   Ambulation  10 ft significant assist  50 ft mod indep   Stairs  Total assist/NT 12 steps supervision      Goal: Mod indep with transfers and ambulation;  Some mild assist for dressing, bathing  Major barriers:  Incoordination, imbalance, deconditioning   Dispo: Home Tuesday with  PT, RN    * Cervical myelopathy [facility])  Assessment & Plan  Function improving adequately and still on track functionally for d/c Tuesday - will start with [facility] RN, PT - with skin care needed   - IRP  - Neuro exam stable 3/28 - POD17  - Hb trending down some - incision still with sanguinous drainage; some edema, stable blanchable erythema without increased warmth or tenderness - overall fairly stable   - Spoke with NSx-NILTON Bravo Henny 3/28 - recommends no additional imaging at this time and OP follow-up Thursday  - Patient seen by Nsx 3/24   - No need for additional imaging at that time    - Continue Keflex for 7 day course    - They recommend continuing staples longer but max 21 days - Friday Apr 1     Cervical myelopathy S/P anterior cervical discectomy and fixation fusion C5/6 and C6/7; Posterior cervical decompression and instrumented fusion C3-T1 by Dr Jocelyne Melendrez on 3/11/22     - 3/25 - still with sanguinous drainage; ongoing stable erythema; stable mild edema; no increased TTP   - Continue Keflex 500mg Q6H    - Patient afebrile, CBC without leukocytosis   - Dressing changes daily and PRN   - Wound care consulted and ordered foam dressings to be changed BID and PRN   - Teach family incision/skin care   - Aspen cervical collar on at all times except can use [employer] for showers  - Activity Restrictions: No heavy lifting greater than 5 - 10lbs  No strenuous activities  No driving while requiring cervical collar, anticipated six weeks  No significant neck movement  - May shower 3 days after surgery, but do not soak in a tub and no swimming, use mild antimicrobial soap and water  Pat incision dry after showering   - Monitor for neuro changes, dysphagia, neurogenic bowel/bladder, spasticity  - Continue acute comprehensive interdisciplinary inpatient rehabilitation to include intensive skilled therapies (PT, OT) as outlined with oversight and management by rehabilitation physician as well as inpatient rehab level nursing, case management and weekly interdisciplinary team meetings  - Optimal pain management  - Follow-up with Spine Sx and if needed PMR after d/c         Head pain cephalgia  Assessment & Plan  Stable - pt finds gabapentin helpful - continue   Has been some burning type pain largely on his posterior scalp/post auricular L>R -  particularly when he lays down - worse at night - sub-optimal control of pain last evening   - Per NSx " Noticed a few burning twinges in left high para sagittal low suboccipital neck region; Likely from stretching of parasagittal muscles andor medial fibers supraspinatus muscle"  - Scalp palpated again and unremarkable - no edema, increased warmth, erythema, or TTP > stable again today   - Cervical incision stable - see above for additional mgmt and NSx follow-up > continue to monitor   - Neuro exam stable > continue to monitor   - Monitor closely - neuralgia? other   - Adjust Gabapentin to 100mg qday and 300mg HS     Urinary dysfunction  Assessment & Plan  Improving - urinating adequately recenlty   Hx of BPH on chronic flomax  - Home flomax resumed 3/21   - Monitor closely   - He reported urinary incontinence worsening prior to admission that has been improving since sx  - Notes some still impaired sensation   - voiding adequately with fairly low PVRS - hold scans   - Monitor for infection     Pain  Assessment & Plan  Acceptable control - continue mgmt as outlined   - APAP TID  - PRN oxy 2 5-5mg Q4H PRN  - Robaxin 500mg change to Q6H PRN for spasms  - Gabapentin 100mg qday and 300mg HS   - PRN LD cream for needlesticks         Anemia  Assessment & Plan  Hb trending down some  - Sanguinous drainage at times from incision - overall mgmt of c spine per NSx  - Discussed with IM - repeat CBC tomorrow  - Follow-up with NSx   - No clear gross signs of bleeding otherwise  - Warfarin mgmt per IM (INR supratx 3/28) > adjustments per IM  - IM consulted, with overall monitoring, and management at their discretion during ARC course  - Monitor H/H, vitals, signs/symptoms of acute bleeding        At risk for venous thromboembolism (VTE)  Assessment & Plan  Improved   Mobility increased > continue mobilization  Warfarin     Hyponatremia  Assessment & Plan  Improving 3/28    Sinus bradycardia  Assessment & Plan  IM consulted and with overall management at their discretion during ARC course  Card OP follow-up     Paroxysmal A-fib ([facility] [address] )  Assessment & Plan  IM consulted and with overall management at their discretion during ARC course  Rate control: MTP, flecainide  Antithrombotic:Warfarin     Depression, major, single episode, moderate (HCC)  Assessment & Plan  Cymbalta  Supportive counseling     Factor V Leiden mutation ([facility] [address] )  Assessment & Plan  Warfarin per IM     WILL (obstructive sleep apnea)  Assessment & Plan  CPAP  - If patient unable to use CPAP - spot check O2 while sleeping and provide 2 NC with goal SpO2>91% - titrate as needed  - Consider noct ox    Essential hypertension  Assessment & Plan  Internal medicine consulted and management at their discretion  Monitor vitals with and without activity; monitor for orthostasis  Monitor hemoglobin, electrolytes, kidney function, hydration status   Current meds: Amlodipine, MTP      Dyslipidemia  Assessment & Plan  Statin       Other Medical Issues:   None       Follow-up providers and other issues to be followed up after discharge  · PCP  · Neurosx  · Cards  · Hem-onc     CODE: Level 3: DNAR and DNI per discussion with pt/family     Restrictions include: Fall precautions    Objective:     Allergies per EMR  Diagnostic Studies: Reviewed, no new imaging  No orders to display     See above as well    Laboratory: Labs reviewed  Results from last 7 days   Lab Units 03/28/22  0501 03/24/22  0706   HEMOGLOBIN g/dL 9 5* 11 0*   HEMATOCRIT % 29 8* 30 8*   WBC Thousand/uL 8 76 8 61     Results from last 7 days   Lab Units 03/28/22  0501 03/24/22  0706   BUN mg/dL 14 14   SODIUM mmol/L 135* 134*   POTASSIUM mmol/L 3 1* 3 6   CHLORIDE mmol/L 97* 99*   CREATININE mg/dL 0 65 0 65     Results from last 7 days   Lab Units 03/28/22  0501 03/24/22  0706 03/22/22  0603   PROTIME seconds 30 8* 26 9* 27 2*   INR  3 15* 2 64* 2 68*        Drug regimen reviewed, all potential adverse effects identified and addressed:    Scheduled Meds:  Current Facility-Administered Medications   Medication Dose Route Frequency Provider Last Rate    acetaminophen  975 mg Oral [facility] Palmira Oneil MD      amLODIPine  5 mg Oral BID Palmira Oneil MD      atorvastatin  40 mg Oral QPM Palmira Oneil MD      bisacodyl  10 mg Rectal Daily PRN Palmira Oneil MD      calcium carbonate  1,000 mg Oral Daily PRN Palmira Oneil MD      cephalexin  500 mg Oral Q6H [address] Palmira Oneil MD      docusate sodium  100 mg Oral BID Palmira Oneil MD      DULoxetine  60 mg Oral HS Palmira Oneil MD      flecainide  100 mg Oral BID Palmira Oneil MD      gabapentin  100 mg Oral Daily Palmira Oneil MD      gabapentin  300 mg Oral HS Palmira Oneil MD      lidocaine   Topical 4x Daily PRN Palmira Oneil MD      methocarbamol  500 mg Oral Q6H PRN Palmira Oneil MD      metoprolol succinate  100 mg Oral Daily Marisol Severe Hi Murcia MD      oxyCODONE  2 5 mg Oral Q4H PRN Hieu Currie MD      oxyCODONE  5 mg Oral Q4H PRN Hieu Currie MD      pantoprazole  40 mg Oral Early Morning Hieu Currie MD      polyethylene glycol  17 g Oral Daily PRN Hieu Currie MD      senna  1 tablet Oral Daily Hieu Currie MD      tamsulosin  0 4 mg Oral Daily With Jlui Rock MD      [START ON 3/29/2022] warfarin  2 5 mg Oral Once per day on Sun Mon Tue Thu Fri Sat Henna Hsieh PA-C      [START ON 3/30/2022] warfarin  5 mg Oral Once per day on Wed DEEPTHI Cormier         Chief Complaints:  Rehab follow-up    Subjective:     Patient reports intermittent burning in posterior scalp and behind L ear that is worse usually at night is stable and better controlled with gabapentin  He denies feeling overly sedating  Patient denies increased neck pain, pain radiating down arms, or legs  He reports each day do better in terms of strength and balance  He denies bowel/bladder problems or other new complaints  ROS: A 10 point ROS was performed; negative except as noted above  Extended discussion held with wife/patient to review mgmt plan        Physical Exam:  Vitals:    03/28/22 1628   BP: 134/65   Pulse: 77   Resp: 17   Temp: 98 5 °F (36 9 °C)   SpO2: 95%       GEN:  Sitting in NAD   HEENT/NECK: C collar initially; slight sanginous/blood drainage still along mid-lower incision line; stable erythema/edema without increased warmth or tenderness  CARDIAC: Regular rate rhythm, no murmers, no rubs, no gallops  LUNGS:  clear to auscultation, no wheezes, rales, or rhonchi  ABDOMEN: Soft, non-tender, non-distended, normal active bowel sounds  EXTREMITIES/SKIN:  no calf edema, no calf tenderness to palpation  NEURO:   MENTAL STATUS:  Appropriate wakefulness and interaction  and Strength/MMT:  5/5 throughout  PSYCH:  Affect:  Euthymic     HPI:  Tawana Cox is a 71 y o  male with PMH of Afib, HTN, bradycardia, bilateral knee OA, depression, Factor V Leiden, on chronic A/C who developed progressive gait imbalance followed by more acute R sided weakness/sensory changes and imbalance found to have significant significant cervical spinal cord compression from degenerative spondylosis most consistent with cervical myelopathy  Patient underwent anterior cervical discectomy and fixation fusion C5/6 and C6/7; Posterior cervical decompression and instrumented fusion C3-T1 by Dr Pilar Mohan on 3/11/22  Post-op course notable for significant decline in ADLs and mobility and he appears to be appropriate for admission to [facility] at this time        ** Please Note: Fluency Direct voice to text software may have been used in the creation of this document  **    35 minutes or greater spent face-to-face with patient and wife during this encounter and with coordination of care  Extended discussion today held with patient regarding current condition, medical history, mood, medical/rehabilitation management, and disposition

## 2022-03-28 NOTE — ASSESSMENT & PLAN NOTE
Hb trending down some but stable compared to yesterday  - 12>11>9 5>9  Julianne Forman 4  - Sanguinous drainage at times from incision - overall mgmt of c spine per NSx  - Repeat CBC within 1 week with PCP   - Follow-up with NSx and PCP after d/c   - No clear gross signs of bleeding otherwise  - Optimal warfarin mgmt

## 2022-03-28 NOTE — PROGRESS NOTES
03/28/22 1330   Pain Assessment   Pain Assessment Tool 0-10   Pain Score 8   Pain Location/Orientation Location: Neck   Pain Onset/Description Onset: Ongoing; Descriptor: Aching;Descriptor: Burning   Restrictions/Precautions   Precautions Fall Risk;Pain;Spinal precautions   Braces or Orthoses C/S Collar   Cognition   Overall Cognitive Status WFL   Subjective   Subjective "i feel tired today and have pain by the incision"   Roll Left and Right   Type of Assistance Needed Independent   Comment log rolling technique   Roll Left and Right CARE Score 6   Sit to Lying   Type of Assistance Needed Independent   Sit to Lying CARE Score 6   Lying to Sitting on Side of Bed   Type of Assistance Needed Independent   Lying to Sitting on Side of Bed CARE Score 6   Sit to Stand   Type of Assistance Needed Independent   Sit to Stand CARE Score 6   Bed-Chair Transfer   Type of Assistance Needed Independent; Adaptive equipment   Comment with RW   Chair/Bed-to-Chair Transfer CARE Score 6   Car Transfer   Type of Assistance Needed Independent; Adaptive equipment   Comment with RW   Car Transfer CARE Score 6   Walk 10 Feet   Type of Assistance Needed Independent; Adaptive equipment   Comment with RW   Walk 10 Feet CARE Score 6   Walk 50 Feet with Two Turns   Type of Assistance Needed Independent; Adaptive equipment   Comment RW   Walk 50 Feet with Two Turns CARE Score 6   Walk 150 Feet   Type of Assistance Needed Independent; Adaptive equipment   Comment RW   Walk 150 Feet CARE Score 6   Walking 10 Feet on Uneven Surfaces   Type of Assistance Needed Set-up / clean-up; Adaptive equipment   Comment mat on floor with RW   Walking 10 Feet on Uneven Surfaces CARE Score 5   Ambulation   Distance Walked (feet) 300 ft  (x2)   Assist Device Roller Walker   Gait Pattern Inconsistant Danae; Improper weight shift   Limitations Noted In Balance;Sensation;Speed;Strength   Findings recommendation for RW at time of d/c at all times       Wheel 50 Feet with Two Turns   Reason if not Attempted Activity not applicable   Wheel 50 Feet with Two Turns CARE Score 9   Wheel 150 Feet   Reason if not Attempted Activity not applicable   Wheel 874 Feet CARE Score 9   Wheelchair mobility   Does the patient use a wheelchair? 0  No   Curb or Single Stair   Style negotiated Curb   Type of Assistance Needed Supervision; Adaptive equipment   Comment with RW   1 Step (Curb) CARE Score 4   4 Steps   Type of Assistance Needed Supervision; Adaptive equipment   Comment B HRs on FF   4 Steps CARE Score 4   12 Steps   Type of Assistance Needed Supervision; Adaptive equipment   Comment B HRs on FF; reciprocal pattern   12 Steps CARE Score 4   Stairs   # of Steps 20   Picking Up Object   Type of Assistance Needed Independent; Adaptive equipment   Comment with reacher   Picking Up Object CARE Score 6   Therapeutic Interventions   Strengthening reviewed and gave pt HEP with supine, sitting and standing there exs to improve hip strengthening    Flexibility B gastroc and HS x3mins   Equipment Use   NuStep 12mins L2 LEs only   Assessment   Treatment Assessment pt has met all goals at this time for d/c home tomorrow  pt to cont use of RW at all times due to impaired balance and righting reactions without the use of AD   pt's wife present for session and although unable to physically help, will  be there for support at time of d/c   pt to cont with outpt therapy services to improve deficits to maximize functional ind nad safety   Family/Caregiver Present wife, King Seymour   Barriers to Discharge None   PT Barriers   Physical Impairment Decreased strength;Decreased range of motion;Decreased endurance; Impaired balance;Decreased mobility;Pain;Orthopedic restrictions; Impaired sensation   Functional Limitation Ramp negotiation;Stair negotiation; Walking   Plan   Progress Progressing toward goals   Recommendation   PT Discharge Recommendation Home with outpatient rehabilitation   Equipment Recommended Chacorta Mcdonald   PT Therapy Minutes   PT Time In 1330   PT Time Out 1500   PT Total Time (minutes) 90   PT Mode of treatment - Individual (minutes) 90   PT Mode of treatment - Concurrent (minutes) 0   PT Mode of treatment - Group (minutes) 0   PT Mode of treatment - Co-treat (minutes) 0   PT Mode of Treatment - Total time(minutes) 90 minutes   PT Cumulative Minutes 843   Therapy Time missed   Time missed?  No

## 2022-03-28 NOTE — DISCHARGE SUMMARY
Discharge Summary - PMR   Praveen Hill 71 y o  male MRN: [id number]  Unit/Bed#: -01 Encounter: [id number]    Admission Date: 3/18/2022     Discharge Date: 3/29/2022    Rehabilitation/Etiologic Diagnosis:   Spinal Cord Dysfunction:  Non-Traumatic:  04 130 Other Non-Traumatic See below  Cervical myelopathy    Discharge Diagnoses:      Patient Active Problem List   Diagnosis    Dyslipidemia    Essential hypertension    WILL (obstructive sleep apnea)    Factor V Leiden mutation (HCC)    Paroxysmal A-fib ([address] )    History of DVT of lower extremity    Cervical myelopathy (HCC)    Sinus bradycardia    Pain    Hyponatremia    Urinary dysfunction    Head pain cephalgia    Anemia    Hypokalemia    Warfarin anticoagulation       Acute [facility] Course:      Patient participated in a comprehensive interdisciplinary inpatient rehabilitation program which included involvment of MD, therapies (PT, OT), RN, CM/SW, dietary  He made significant functional gains and was able to be advanced to a largely mod indep for transfers, ambulation short distances and ADLs except for UB dressing which requires some level of assist and is considered adequately safe for discharge home with family who completed caregiver training  Medical issues with comanagement from our internal medicine, neurosurgery, and wound care teams as outlined below  Please see below for patient's hospital course and day to day management of medical needs with significant findings, complications (if applicable), treatment, and services provided in problem list format        Functional assessment:  Improving                                                    Admit                                     Recent  Total assist UBD     Significant assist       Mod assist  To hygiene, bathing, LBD UBD   Min assist        Contact Guard      Supervision    Bathing   Mod Indep/Indep   To hygiene, LBD   Transfers Mod assist  Mod indep Ambulation  10 ft significant assist  50 ft mod indep   Stairs  Total assist/NT 12 steps supervision         Dispo: Home with Bladimir Groves PT, RN     Warfarin anticoagulation  Assessment & Plan  Mgmt per medicine team  Discharge recs:  Warfarin 2 5mg (hold tonight) > restart warfarin 3/30 at 2 5mg daily and adjust based on PT/INR   PT/INR Thursday (but could wait until Monday per medicine) and then next week with close OP follow-up with PCP  3/23 (5mg) > 3/24 2 64 (2 5mg) > 3/25 (2 5mg) > 3/26 (2 5mg) > 3/27 (2 5mg) > 3/28 3 15 (held) > 3/29 2 96 (Plan to hold tonight) > 3/30 resume 2 5mg qday (PT/INR 3/31 and then at discretion of PCP)  OP adjustments per PCP     * Cervical myelopathy ([facility] [address] )  Assessment & Plan  Function improved significantly over course   Patient reports much improved strength, balance, and ambulation s/p sx; reports improved urinary control   Rec HH RN, PT    - Neuro exam stable 3/30 - POD19    - Incision still with some surrounding erythema, edema and some largely sanguinous drainage;    - Discussed with Emmanuelle-NILTON Quiles prior to d/c   - He is believed to have some local hematoma with questionable superficial infection   - Patient required post-op heparin drip and rapid transition back to full A/C with warfarin due to high VTE risk given hx of hypercoag status/Factor V Leiden mutation, prior DVT  - Hb trending down some recently   - Patient afebrile without recently leukocytosis    - NSx consulted and closely assisted with mgmt and recs during ARC course   - Cleared for d/c by NSx     - Recommend continuing 7 day course of Keflex   - No additional imaging at this time    - OP follow-up with NSx in 2 days    - Spoke with Shruthi Quiles 3/28 - recommends no additional imaging at this time and OP follow-up Thursday  - Patient seen by Nsx 3/24   - No need for additional imaging at that time    - Continue Keflex for 7 day course    - They recommend continuing staples longer but max 21 days - Friday Apr 1     Cervical myelopathy S/P anterior cervical discectomy and fixation fusion C5/6 and C6/7; Posterior cervical decompression and instrumented fusion C3-T1 by Dr Shun Richey on 3/11/22  - Wound care consulted and ordered foam dressings to be changed BID and PRN   - Completed family incision/skin care   - Aspen cervical collar on at all times except can use [place] for showers  - Activity Restrictions: No heavy lifting greater than 5 - 10lbs  No strenuous activities  No driving while requiring cervical collar, anticipated six weeks  No significant neck movement  - May shower 3 days after surgery, but do not soak in a tub and no swimming, use mild antimicrobial soap and water  Pat incision dry after showering   - Completed acute comprehensive interdisciplinary inpatient rehabilitation to include intensive skilled therapies (PT, OT) as outlined with oversight and management by rehabilitation physician as well as inpatient rehab level nursing, case management and weekly interdisciplinary team meetings  - Optimal pain management        Head pain cephalgia  Assessment & Plan  - Has been reporting some burning type pain largely on his posterior scalp/post auricular L>R -  particularly when he lays down - worse at night - neuralgia/neuritis +/- MSK  - Per NSx " Noticed a few burning twinges in left high para sagittal low suboccipital neck region; Likely from stretching of parasagittal muscles andor medial fibers supraspinatus muscle"  - Scalp palpated again and unremarkable again prior to d/c - no edema, increased warmth, erythema, or TTP   - Cervical incision as outlined above   - Neuro exam stable > continue to monitor   - Gabapentin to 100mg qday and 300mg HS   - OP NSx follow-up     Anemia  Assessment & Plan  Hb trending down some but stable compared to yesterday  - 12>11>9 5>9  Maribell Ada 4  - Sanguinous drainage at times from incision - overall mgmt of c spine per NSx  - Repeat CBC within 1 week with PCP   - Follow-up with NSx and PCP after d/c   - No clear gross signs of bleeding otherwise  - Optimal warfarin mgmt         Pain  Assessment & Plan  D/C on   - APAP TID PRN   - PRN oxy 2 5-5mg TID PRN Disp #15  - [place] PDMP website checked and no concerning Rx's or Rx patterns noted    - Gabapentin 100mg qday and 300mg HS         Urinary dysfunction  Assessment & Plan  Improved s/p sx  - urinating adequately recenlty   Hx of BPH on chronic flomax  - Home flomax resumed 3/21   - He reported urinary incontinence worsening prior to admission that has been improving since sx  - Noted some impaired sensation > improved  OP PCP follow-up     Hypokalemia  Assessment & Plan  Mgmt per IM  Recommend discharging on K-dur 40mEq daily x7 days  Repeat BMP later this week (or early next week)  Follow-up with PCP     At risk for venous thromboembolism (VTE)  Assessment & Plan  Improved   Mobility increased > continue mobilization  Warfarin     Hyponatremia  Assessment & Plan  Improving 3/28    Sinus bradycardia  Assessment & Plan  IM consulted and with overall management at their discretion during ARC course  Card OP follow-up     Paroxysmal A-fib ([facility] [address] )  Assessment & Plan  IM consulted and with overall management at their discretion during ARC course  Rate control: MTP, flecainide  Antithrombotic:Warfarin     Depression, major, single episode, moderate (HCC)  Assessment & Plan  Cymbalta  Supportive counseling     Factor V Leiden mutation ([facility] [address] )  Assessment & Plan  Warfarin per IM     WILL (obstructive sleep apnea)  Assessment & Plan  CPAP  - If patient unable to use CPAP - spot check O2 while sleeping and provide 2 NC with goal SpO2>91% - titrate as needed  - Consider noct ox    Essential hypertension  Assessment & Plan  Internal medicine consulted and management at their discretion  Current meds: Amlodipine, MTP  OP PCP follow-up       Dyslipidemia  Assessment & Plan  Statin       Follow-up providers and other issues to be followed up after discharge  · PCP  · Neurosx  · Cards  · Hem-onc     CODE: Level 3: DNAR and DNI per discussion with pt/family     Restrictions include:  Fall precautions     - See AVS (After visit summary) with discharge instructions, discharge medications, and other details  Imaging (See above as well): No orders to display       Pertinent Recent Labs (See Course above, EPIC EMR, and if needed request additional records):  Results for Michelle Rodriguez (MRN [id number]) as of 3/30/2022 06:29   Ref   Range 3/28/2022 05:01 3/29/2022 06:06   Sodium Latest Ref Range: 136 - 145 mmol/L 135 (L) 134 (L)   Potassium Latest Ref Range: 3 5 - 5 3 mmol/L 3 1 (L) 3 2 (L)   Chloride Latest Ref Range: 100 - 108 mmol/L 97 (L) 99 (L)   CO2 Latest Ref Range: 21 - 32 mmol/L 31 31   Anion Gap Latest Ref Range: 4 - 13 mmol/L 7 4   BUN Latest Ref Range: 5 - 25 mg/dL 14 11   Creatinine Latest Ref Range: 0 60 - 1 30 mg/dL 0 65 0 53 (L)   Glucose, Random Latest Ref Range: 65 - 140 mg/dL 107 126   Calcium Latest Ref Range: 8 3 - 10 1 mg/dL 9 1 9 0   eGFR Latest Units: ml/min/1 73sq m 97 106   Magnesium Latest Ref Range: 1 6 - 2 6 mg/dL  1 9   WBC Latest Ref Range: 4 31 - 10 16 Thousand/uL 8 76 8 31   Red Blood Cell Count Latest Ref Range: 3 88 - 5 62 Million/uL 3 17 (L) 3 08 (L)   Hemoglobin Latest Ref Range: 12 0 - 17 0 g/dL 9 5 (L) 9 4 (L)   HCT Latest Ref Range: 36 5 - 49 3 % 29 8 (L) 28 8 (L)   MCV Latest Ref Range: 82 - 98 fL 94 94   MCH Latest Ref Range: 26 8 - 34 3 pg 30 0 30 5   MCHC Latest Ref Range: 31 4 - 37 4 g/dL 31 9 32 6   RDW Latest Ref Range: 11 6 - 15 1 % 13 2 13 2   Platelet Count Latest Ref Range: 149 - 390 Thousands/uL 273 291       Procedures Performed During ARC Admission: None    Discharge Physical Examination:  Temp:  [97 7 °F (36 5 °C)] 97 7 °F (36 5 °C)  HR:  [66] 66  Resp:  [18] 18  BP: (119)/(65) 119/65  SpO2:  [96 %] 96 %  GEN:  Sitting in NAD   HEENT/NECK: C Collar - MMM; Ant incision unremarkable; post incision with some erythema and edema with slight largely sanginous drainage without significant increased warmth or TTP; posterior scalp without edema, TTP, erythema  CARDIAC: Regular rate rhythm, no murmers, no rubs, no gallops  LUNGS:  clear to auscultation, no wheezes, rales, or rhonchi  ABDOMEN: Soft, non-tender, non-distended, normal active bowel sounds  EXTREMITIES/SKIN:  no calf edema, no calf tenderness to palpation  NEURO:   MENTAL STATUS: awake, oriented to person, place, time, and situation, MENTAL STATUS:  Appropriate wakefulness and interaction , CN II-XII: grossly intact  and Strength/MMT:  Full throughout   PSYCH:  Affect:  Euthymic     HPI:   Angelo Rodrigues is a 71 y o  male with PMH of Afib, HTN, bradycardia, bilateral knee OA, depression, Factor V Leiden, on chronic A/C who developed progressive gait imbalance followed by more acute R sided weakness/sensory changes and imbalance found to have significant significant cervical spinal cord compression from degenerative spondylosis most consistent with cervical myelopathy  Patient underwent anterior cervical discectomy and fixation fusion C5/6 and C6/7; Posterior cervical decompression and instrumented fusion C3-T1 by Dr May Narvaez on 3/11/22  Post-op course notable for significant decline in ADLs and mobility and he appears to be appropriate for admission to [facility] at this time      Chief complaint:  S/P spinal surgery     Subjective: On eval, patient reports he is feeling quite a bit better already after his surgery with improving R sided strength and coordination  He feels like he has improved hand dexterity bilaterally as well  Patient reports urinary frequency and leakage prior to surgery and this is improving as well  He denies constipation, fever, chills, sweats, SOB, calf pain, nausea, or other new complaints  He reports neck pain/tightness adequately helped with current regimen      Review of Systems: A 10-point review of systems was performed   Negative except as listed above      Condition at Discharge: stable     Discharge instructions/Information to patient and family:   See after visit summary for information provided to patient and family  Provisions for Follow-Up Care:  See after visit summary for information related to follow-up care and any pertinent home health orders  Disposition: Home with family     Planned Readmission:  No    Discharge Statement   Total time spent examining patient, counseling patient (or patient/family) on condition, medication, rehabilitation/medical plan, and coordinating care on day of discharge: at least 45 minutes  Greater than 50% of the total time was spent examining patient, answering all questions, directly discussing plan of care and post-discharge instructions with patient (or patient and family)  Additional time spent on coordinating care and other discharge activities  Discharge Medications:  See after visit summary for reconciled discharge medications provided to patient and family  I personally performed the required components and examined the patient myself in person on 3/29/22

## 2022-03-29 ENCOUNTER — TRANSITIONAL CARE MANAGEMENT (OUTPATIENT)
Dept: FAMILY MEDICINE CLINIC | Facility: CLINIC | Age: 72
End: 2022-03-29

## 2022-03-29 ENCOUNTER — TELEPHONE (OUTPATIENT)
Dept: FAMILY MEDICINE CLINIC | Facility: CLINIC | Age: 72
End: 2022-03-29

## 2022-03-29 VITALS
HEIGHT: 71 IN | DIASTOLIC BLOOD PRESSURE: 65 MMHG | BODY MASS INDEX: 31.92 KG/M2 | HEART RATE: 66 BPM | OXYGEN SATURATION: 96 % | WEIGHT: 228 LBS | RESPIRATION RATE: 18 BRPM | SYSTOLIC BLOOD PRESSURE: 119 MMHG | TEMPERATURE: 97.7 F

## 2022-03-29 DIAGNOSIS — D68.51 FACTOR V LEIDEN MUTATION (HCC): ICD-10-CM

## 2022-03-29 DIAGNOSIS — I10 ESSENTIAL HYPERTENSION: Primary | ICD-10-CM

## 2022-03-29 PROBLEM — Z79.01 WARFARIN ANTICOAGULATION: Status: ACTIVE | Noted: 2022-03-29

## 2022-03-29 PROBLEM — E87.6 HYPOKALEMIA: Status: ACTIVE | Noted: 2022-03-29

## 2022-03-29 PROBLEM — D72.829 LEUKOCYTOSIS: Status: RESOLVED | Noted: 2022-03-13 | Resolved: 2022-03-29

## 2022-03-29 LAB
ANION GAP SERPL CALCULATED.3IONS-SCNC: 4 MMOL/L (ref 4–13)
BASOPHILS # BLD AUTO: 0.02 THOUSANDS/ΜL (ref 0–0.1)
BASOPHILS NFR BLD AUTO: 0 % (ref 0–1)
BUN SERPL-MCNC: 11 MG/DL (ref 5–25)
CALCIUM SERPL-MCNC: 9 MG/DL (ref 8.3–10.1)
CHLORIDE SERPL-SCNC: 99 MMOL/L (ref 100–108)
CO2 SERPL-SCNC: 31 MMOL/L (ref 21–32)
CREAT SERPL-MCNC: 0.53 MG/DL (ref 0.6–1.3)
EOSINOPHIL # BLD AUTO: 0.27 THOUSAND/ΜL (ref 0–0.61)
EOSINOPHIL NFR BLD AUTO: 3 % (ref 0–6)
ERYTHROCYTE [DISTWIDTH] IN BLOOD BY AUTOMATED COUNT: 13.2 % (ref 11.6–15.1)
GFR SERPL CREATININE-BSD FRML MDRD: 106 ML/MIN/1.73SQ M
GLUCOSE SERPL-MCNC: 126 MG/DL (ref 65–140)
HCT VFR BLD AUTO: 28.8 % (ref 36.5–49.3)
HGB BLD-MCNC: 9.4 G/DL (ref 12–17)
IMM GRANULOCYTES # BLD AUTO: 0.05 THOUSAND/UL (ref 0–0.2)
IMM GRANULOCYTES NFR BLD AUTO: 1 % (ref 0–2)
INR PPP: 2.96 (ref 0.84–1.19)
LYMPHOCYTES # BLD AUTO: 0.69 THOUSANDS/ΜL (ref 0.6–4.47)
LYMPHOCYTES NFR BLD AUTO: 8 % (ref 14–44)
MAGNESIUM SERPL-MCNC: 1.9 MG/DL (ref 1.6–2.6)
MCH RBC QN AUTO: 30.5 PG (ref 26.8–34.3)
MCHC RBC AUTO-ENTMCNC: 32.6 G/DL (ref 31.4–37.4)
MCV RBC AUTO: 94 FL (ref 82–98)
MONOCYTES # BLD AUTO: 0.53 THOUSAND/ΜL (ref 0.17–1.22)
MONOCYTES NFR BLD AUTO: 6 % (ref 4–12)
NEUTROPHILS # BLD AUTO: 6.75 THOUSANDS/ΜL (ref 1.85–7.62)
NEUTS SEG NFR BLD AUTO: 82 % (ref 43–75)
NRBC BLD AUTO-RTO: 0 /100 WBCS
PLATELET # BLD AUTO: 291 THOUSANDS/UL (ref 149–390)
PMV BLD AUTO: 9.1 FL (ref 8.9–12.7)
POTASSIUM SERPL-SCNC: 3.2 MMOL/L (ref 3.5–5.3)
PROTHROMBIN TIME: 29.3 SECONDS (ref 11.6–14.5)
RBC # BLD AUTO: 3.08 MILLION/UL (ref 3.88–5.62)
SODIUM SERPL-SCNC: 134 MMOL/L (ref 136–145)
WBC # BLD AUTO: 8.31 THOUSAND/UL (ref 4.31–10.16)

## 2022-03-29 PROCEDURE — 80048 BASIC METABOLIC PNL TOTAL CA: CPT | Performed by: PHYSICIAN ASSISTANT

## 2022-03-29 PROCEDURE — 99232 SBSQ HOSP IP/OBS MODERATE 35: CPT | Performed by: INTERNAL MEDICINE

## 2022-03-29 PROCEDURE — 85610 PROTHROMBIN TIME: CPT | Performed by: PHYSICIAN ASSISTANT

## 2022-03-29 PROCEDURE — 83735 ASSAY OF MAGNESIUM: CPT

## 2022-03-29 PROCEDURE — 99239 HOSP IP/OBS DSCHRG MGMT >30: CPT

## 2022-03-29 PROCEDURE — 85025 COMPLETE CBC W/AUTO DIFF WBC: CPT | Performed by: PHYSICIAN ASSISTANT

## 2022-03-29 RX ORDER — AMLODIPINE BESYLATE 5 MG/1
5 TABLET ORAL DAILY
Status: DISCONTINUED | OUTPATIENT
Start: 2022-03-29 | End: 2022-03-29 | Stop reason: HOSPADM

## 2022-03-29 RX ORDER — POTASSIUM CHLORIDE 20 MEQ/1
40 TABLET, EXTENDED RELEASE ORAL DAILY
Qty: 14 TABLET | Refills: 0 | Status: SHIPPED | OUTPATIENT
Start: 2022-03-30 | End: 2022-05-05

## 2022-03-29 RX ORDER — WARFARIN SODIUM 2.5 MG/1
2.5 TABLET ORAL
Status: DISCONTINUED | OUTPATIENT
Start: 2022-03-30 | End: 2022-03-29 | Stop reason: HOSPADM

## 2022-03-29 RX ORDER — POTASSIUM CHLORIDE 20 MEQ/1
40 TABLET, EXTENDED RELEASE ORAL DAILY
Status: DISCONTINUED | OUTPATIENT
Start: 2022-03-29 | End: 2022-03-29 | Stop reason: HOSPADM

## 2022-03-29 RX ORDER — AMLODIPINE BESYLATE 5 MG/1
5 TABLET ORAL DAILY
Qty: 30 TABLET | Refills: 0 | Status: SHIPPED | OUTPATIENT
Start: 2022-03-29 | End: 2022-06-04

## 2022-03-29 RX ORDER — WARFARIN SODIUM 5 MG/1
TABLET ORAL
Qty: 90 TABLET | Refills: 0 | Status: ON HOLD
Start: 2022-03-29 | End: 2022-04-12 | Stop reason: CLARIF

## 2022-03-29 RX ADMIN — CEPHALEXIN 500 MG: 500 CAPSULE ORAL at 00:05

## 2022-03-29 RX ADMIN — ACETAMINOPHEN 975 MG: 325 TABLET ORAL at 05:06

## 2022-03-29 RX ADMIN — PANTOPRAZOLE SODIUM 40 MG: 40 TABLET, DELAYED RELEASE ORAL at 05:06

## 2022-03-29 RX ADMIN — DOCUSATE SODIUM 100 MG: 100 CAPSULE, LIQUID FILLED ORAL at 09:09

## 2022-03-29 RX ADMIN — GABAPENTIN 100 MG: 100 CAPSULE ORAL at 09:09

## 2022-03-29 RX ADMIN — POTASSIUM CHLORIDE 40 MEQ: 20 TABLET, EXTENDED RELEASE ORAL at 09:09

## 2022-03-29 RX ADMIN — FLECAINIDE ACETATE 100 MG: 100 TABLET ORAL at 09:09

## 2022-03-29 RX ADMIN — METOPROLOL SUCCINATE 100 MG: 100 TABLET, EXTENDED RELEASE ORAL at 09:09

## 2022-03-29 RX ADMIN — CEPHALEXIN 500 MG: 500 CAPSULE ORAL at 12:01

## 2022-03-29 RX ADMIN — AMLODIPINE BESYLATE 5 MG: 5 TABLET ORAL at 09:09

## 2022-03-29 RX ADMIN — CEPHALEXIN 500 MG: 500 CAPSULE ORAL at 05:06

## 2022-03-29 RX ADMIN — STANDARDIZED SENNA CONCENTRATE 8.6 MG: 8.6 TABLET ORAL at 09:09

## 2022-03-29 NOTE — PHYSICAL THERAPY NOTE
ARC PT Discharge Summary  Pt demonstrated good progress in PT and met level surface mobility goals except for uneven surfaces including stairs  At time of d/c pt is mod indep with level surface mobilities using a RW while required set up/DS for uneven surface mobilities and S for FF negotiation with bilat HR or curb step using a RW  Family training completed with wife confirming ability to provide S  No DME needs identified at this admission since pt already has a walker at home  Pt also instructed with verbal training and modelling for HEP with handout provided to promote maintenance of increased muscle strength and endurance upon return to home  Pt was d/c on 3/29/22 to home with family support and home PT/OT/RN services to address ongoing rehab needs

## 2022-03-29 NOTE — PROGRESS NOTES
Internal Medicine Progress Note  Patient: Frandy Hickey  Age/sex: 71 y o  male  Medical Record #: [id number]      ASSESSMENT/PLAN: (Interval History)  Frandy Hickey is seen and examined and management for following issues:    S/p anterior cervical discectomy and fixation fusion C5-6 and C6-7, posterior cervical decompression and instrumented fusion C3-T1  · Aspen collar at all times  · S/p Decadron   · Pain control and therapy per PMR  · Pt continues to have bleeding from the distal portion of the posterior incision with bruising  · Pt with areas of skin breakdown at the staples at the distal portion of the incision  · Keflex started 3/24  · Appreciate wound nurse reccs to use maxsorb and allevyn bid  · Cont coumadin 2/2 Factor V - holding dose tonight due to INR 2 9  · For DC would recommend resuming coumadin tomorrow at 2 5mg daily and repeating INR Monday     PAF  · INR 2 9  · Pt takes Coumadin 5mg Wed and Sat and 2 5mg Mon, Tues, Thurs, Fri and Sun   · Hold coumadin today, resume coumadin at 2 5mg daily starting 3/30  HTN  · Has been elevated during hospitalization possible due to steroids  · Continue amlodipine 5mg daily and Toprol XL 100mg daily  · BP trending down recommend decreasing amlodipine to daily for dc      Leiden Factor V  · Coumadin as above  Hyponatremia  · stable  · Will continue to monitor  Hypokalemia  · K 3 2  · Recommend daily KCL 40 meq  · Repeat bmp with next INR  · F/u PCP on dc    Anemia  · Hgb down to 9 4  · Has been slowly decreasing during pt's stay  · Likely secondary to wound oozing  · stable      DC planning: OK for dc from medicine standpoint  The above assessment and plan was reviewed and updated as determined by my evaluation of the patient on 3/29/2022      Labs:   Results from last 7 days   Lab Units 03/29/22  0606 03/28/22  0501   WBC Thousand/uL 8 31 8 76   HEMOGLOBIN g/dL 9 4* 9 5*   HEMATOCRIT % 28 8* 29 8*   PLATELETS Thousands/uL 291 273     Results from last 7 days   Lab Units 03/29/22  0606 03/28/22  0501   SODIUM mmol/L 134* 135*   POTASSIUM mmol/L 3 2* 3 1*   CHLORIDE mmol/L 99* 97*   CO2 mmol/L 31 31   BUN mg/dL 11 14   CREATININE mg/dL 0 53* 0 65   CALCIUM mg/dL 9 0 9 1         Results from last 7 days   Lab Units 03/29/22  0606 03/28/22  0501   INR  2 96* 3 15*     Results from last 7 days   Lab Units 03/25/22  0633   POC GLUCOSE mg/dl 130       Review of Scheduled Meds:  Current Facility-Administered Medications   Medication Dose Route Frequency Provider Last Rate    acetaminophen  975 mg Oral [facility] Gerardo Ribeiro MD      amLODIPine  5 mg Oral Daily DEEPTHI Vo      atorvastatin  40 mg Oral QPM Gerardo Ribeiro MD      bisacodyl  10 mg Rectal Daily PRN Gerardo Ribeiro MD      calcium carbonate  1,000 mg Oral Daily PRN Gerardo Ribeiro MD      cephalexin  500 mg Oral Q6H [facility] Gerardo Ribeiro MD      docusate sodium  100 mg Oral BID Gerardo Ribeiro MD      DULoxetine  60 mg Oral HS Gerardo Ribeiro MD      flecainide  100 mg Oral BID Gerardo Ribeiro MD      gabapentin  100 mg Oral Daily Gerardo Ribeiro MD      gabapentin  300 mg Oral HS Gerardo Ribeiro MD      lidocaine   Topical 4x Daily PRN Gerardo Ribeiro MD      methocarbamol  500 mg Oral Q6H PRN Gerardo Ribeiro MD      metoprolol succinate  100 mg Oral Daily Gerardo Ribeiro MD      oxyCODONE  2 5 mg Oral Q4H PRN Gerardo Ribeiro MD      oxyCODONE  5 mg Oral Q4H PRN Gerardo Ribeiro MD      pantoprazole  40 mg Oral Early Morning Gerardo Ribeiro MD      polyethylene glycol  17 g Oral Daily PRN Gerardo Ribeiro MD      potassium chloride  40 mEq Oral Daily DEEPTHI Vo      senna  1 tablet Oral Daily Gerardo Ribeiro MD      tamsulosin  0 4 mg Oral Daily With Angela Castillo MD      [facility] ON 3/30/2022] warfarin  2 5 mg Oral Daily (warfarin) DEEPTHI Vo         Subjective/ HPI: Patient seen and examined   Patients overnight issues or events were reviewed with nursing or staff during rounds or morning huddle session  New or overnight issues include the following:     Pt seen in his room  REady for dc today  We reviewed all his labs and instructions all questions were answered      ROS:   A 10 point ROS was performed; negative except as noted above  Imaging:     No orders to display       *Labs /Radiology studies Reviewed  *Medications  reviewed and reconciled as needed  *Please refer to order section for additional ordered labs studies  *Case discussed with primary attending during morning huddle case rounds    Physical Examination:  Vitals:   Vitals:    03/28/22 1628 03/28/22 2108 03/29/22 0600 03/29/22 0726   BP: 134/65 136/63  119/65   BP Location: Left arm Left arm  Left arm   Pulse: 77 89  66   Resp: 17 20  18   Temp: 98 5 °F (36 9 °C) 99 3 °F (37 4 °C)  97 7 °F (36 5 °C)   TempSrc: Oral Oral  Oral   SpO2: 95% 97%  96%   Weight:   103 kg (228 lb)    Height:         GEN: No apparent distress, interactive  NEURO: Alert and oriented x3  HEENT: Pupils are equal and reactive, EOMI, mucous membranes are moist, face symmetrical; C Collar intact  CV: S1 S2 regular, no MRG, no peripheral edema noted  RESP: Lungs are clear bilaterally, no wheezes, rales or rhonchi noted, on room air, respirations easy and non labored  GI: Flat, soft non tender, non distended; +BS x4  : Voiding without difficulty  MUSC: Moves all extremities  SKIN: pink, warm and dry, normal turgor, dressing in place      The above physical exam was reviewed and updated as determined by my evaluation of the patient on 3/29/2022  Invasive Devices  Report    None                    VTE Pharmacologic Prophylaxis: Warfarin (Coumadin)  Code Status: Level 3 - DNAR and DNI  Current Length of Stay: 11 day(s)      Total time spent:  30 minutes with more than 50% spent counseling/coordinating care   Counseling includes discussion with patient re: progress  and discussion with patient of his/her current medical state/information  Coordination of patient's care was performed in conjunction with primary service  Time invested included review of patient's labs, vitals, and management of their comorbidities with continued monitoring  In addition, this patient was discussed with medical team including physician and advanced extenders  The care of the patient was extensively discussed and appropriate treatment plan was formulated unique for this patient  ** Please Note:  voice to text software may have been used in the creation of this document   Although proof errors in transcription or interpretation are a potential of such software**

## 2022-03-29 NOTE — DISCHARGE INSTRUCTIONS
Cervical decompression and fixation/fusion      Surgical incisional care:   Keep incision clean and dry  Avoid applying creams, lotion or antiseptic to incision area   Check the wound daily  If the incision becomes red, swollen, tender, warm, or has increased drainage please notify physician immediately   Okay to apply a padded dressing over incision while wearing VISTA collar in order to reduce risk of irritation   May shower 3 days after surgery, but do not soak in a tub and no swimming  o Use mild antimicrobial soap and water  Pat incision dry after showering   Cervical VISTA collar to be worn at all times except for showering  Change from VISTA (grey) collar to [place]pe[place] collar prior to showering  Incision may be cleaned with water and a mild antimicrobial soap using a clean washcloth  Incision is to be gently patted dry with a clean towel  Once dry, collar should be changed back to a VISTA (grey) collar with clean pads in place   Hand wash collar pads with mild soap and water  They are to be laid flat to dry on a clean towel  Recommend changing every 1-2 days   Please refer to VISTA collar instructions for further details  Activity Restrictions:   No heavy lifting greater than 5 - 10lbs  No strenuous activities   May walk as tolerated  Encourage at least 4 short walks per day   No driving while requiring cervical collar, anticipated six weeks   No significant neck movement  Postoperative medication:   Please take pain medications to relieve incision pain, and muscle relaxants to prevent spasms as directed  Please see after visit summary (AVS) for details   Do not take ibuprofen, Naproxen/Aleve or any NSAID  May take Tylenol instead  Follow-up as scheduled for a 2 week incision check  Follow-up 6 weeks after surgery with a repeat cervical spine upright x-rays to be completed prior to visit  **Please notify MD immediately if you experience a fever of 101  F, have increased neck or arm pain, new numbness and/or weakness in your arms, difficulty swallowing or breathing especially while lying down, numbness or weakness in arms or legs  **        DISCHARGE INSTRUCTIONS: Jillian Christina 66 22    Bring these instructions with you to your Outpatient Physician appointments so they can order and follow-up any additional lab work or imaging recommended at time of discharge  It  is you or your caregivers responsibility to obtain follow-up MEDICATION REFILLS  As indicated through your Primary Care Physician (PCP) and other outpatient specialty provider(s) after discharge  Please follow-up with your PCP as soon as possible after discharge to set-up follow-up management and when appropriate refills  You remain a fall and injury risk which could be severe  - Your risk of fall has decreased however since admission to acute rehab  Caregiver training has been completed with our staff  - Appropriate supervision +/- assistance as instructed during your rehab course is recommended to decrease risk of fall and injury  - Continue skilled therapy as discussed after discharge to further decrease this risk    If you (or your health care proxy) have any questions or concerns regarding your acute rehabilitation stay including issues with medications, rehabilitation, and follow-up plan, please call:          [address] [place] [address] at [phone] or [phone]  Should you develop fevers, chills, new weakness, changes in sensation, difficulty speaking, facial weakness, confusion, shortness of breath, chest pain, or other concerning symptoms please call 911 and/or obtain transportation to nearest ER immediately  Should you develop worsening pain, swelling, or drainage notify your surgeon right away or obtain transportation to nearest ER for evaluation      PHYSICIANS to see:  Please see your doctors listed in the follow up providers section of your discharge paperwork, and take the discharge paperwork with you to your appointments  Home health has been ordered for you: Your home health agency should reach out to you or your family soon to arrange follow-up  (If [facility] is your provider their phone number is [phone])    If you are unable to get in touch with home health you may contact your  at [phone]  [place]      LAB WORK recommended after discharge: Follow-up lab work at discretion of your outpatient physicians to be determined at time of your future appointments  Also, obtain the following lab orders and follow-up management through primary care physician and outpatient specialists  CBC and BMP to monitor hemoglobin, white blood cell count, electrolytes and kidney function on Thursday 3/31    Need to check potassium for recent hypokalemia and sodium for mild hyponatremia     Need to check CBC to monitor recent anemia and watch white blood cell count     PT/INR obtain Thursday 3/31 thru your PCP or cardiologist and adjust warfarin with goal PT/INR 2 0-3 0    Excessive delay in labs and appropriate follow-up could potentially increase your risk of complications which could be severe and even life-threatening  It is you or your caregiver's responsibility to ensure these tests are ordered by your outpatient providers and followed up with accordingly  IMAGING to follow-up:  Follow-up imaging as discussed with your recent physicians or at discretion of your outpatient physicians to be determined at time of your appointments  ADDITIONAL FINDINGS and ISSUES to follow-up:    Check under the "DISCHARGE PROVIDER" section of these DISCHARGE INSTRUCTIONS for provider specific issues as well        SKIN CARE INSTRUCTIONS to follow:    Change dressing 2 times per day or as needed if soiled as discussed (keep head/neck still and only remove half the collar when changing dressing)    Monitor incision(s) for increased redness, swelling, pain/tenderness, discharge/pus and promptly notify your surgeon should these develop  - Should you develop significant pain, swelling, or drainage obtain transportation to nearest emergency room for immediate evaluation if unable to reach your surgeon promptly   - Should you develop uncontrolled pain, fever, chills, sweats, changes in strength, sensation, or color of this area obtain transportation to nearest emergency room for immediate evaluation  WEIGHTBEARING/ACTIVITY PRECAUTIONS to follow:    Weightbearing as tolerated  Cervical Spinal precautions  Please wear your cervical collar at all times until cleared by spine surgery  Driving restrictions: You are recommended against driving until cleared by an outpatient physician  Work restrictions: You should NOT return to work (if working) until cleared by an outpatient physician  You should not operate heavy machinery (if applicable) until cleared by an outpatient physician  Alcohol restrictions: You are recommended to not drink alcohol at this time unless cleared by an outpatient physician  Drinking alcohol in your current functional condition can increase your risk of injury which could be severe  Drinking alcohol given your current health problems can lead to increased medical complications which could be severe  Combining alcohol with your current medications can increase your risk of injury which could be severe  Smoking restrictions: You are recommended to not smoke nicotine  Smoking increases your risk of heart attack, stroke, emphysema/COPD, and lung cancer  MEDICATIONS:  Please see a full list of your medications outlined in the After Visit Summary that is attached to these Discharge Instructions    Please note changes may have been made to your medications please refer to your discharge paperwork for your current medications and take this list with you to all your doctors appointments for your doctors to review  Please do not resume a home medication unless the medication reconciliation sheet indicates to do so, please do not assume that a medication that you were given a prescription for is the same as a medication you have at home based on both medications having the same name as dosages and frequency may have changed  Unless specifically noted in your medication list provided to you in your discharge paper work do not resume prior vitamins, minerals, or supplements you may have been taking prior to your hospitalization unless instructed by an outpatient physician in the future  Discuss with your primary care at next visit if applicable  NSAID Warning:  Please avoid NSAID (including but not limited to advil, aleve, motrin, naproxen, ibuprofen, mobic, meloxicam, diclofenac etc) medications as NSAID medications may increase your risk of bleeding (which can be life-threatening)  Blood thinners prescribed to optimize long and short term health and decrease risk of complications but with risks related to bleeding and other complications  Coumadin/Warfarin instructions: You have been prescribed warfarin (coumadin)  This medication is used to prevent clots which can cause severe disability and even death  Follow-up with PCP (or cardiology) after discharge from the hospital within next business day to ensure appropriate follow-up management of this medication  Discharge prescription ordered by rehab attending physician based on recommendations by internal medicine team who has been managing patient's anticoagulation (warfarin) throughout entire acute rehab course is:    Warfarin 2 5mg daily (Starting 3/30 - do NOT take on 3/29)  Warfarin is a strong anticoagulant (blood thinner)  It is being recommended for use by you (or your family) to decrease the risk of clotting which can be severely disabling and even life-threatening    Even when provided as recommended it can cause severely disabling and even life-threatening bleeding  Too much or too little warfarin further increases your risk of this medication causing serious and even life-threatening complications such as severe bleeding, clots, strokes and death  With that said, at this time based on available evidence and consensus agreement, your physicians recommend you take warfarin medication based on your overall risks and benefits in your specific medical situation  Warfarin levels are measured by lab work called PT/INR  Too high number or too low number further increases your risks  Your current PT/INR lab goal is 2 0-3 0  You will need to have your PT/INR lab drawn frequently at first and followed closely by your outpatient doctor  Check with your outpatient warfarin provider to ensure your warfarin dose does not need to be adjusted  It is not uncommon to need changes in warfarin dosing particularly early on  If you (or your family/caregiver) notice black stools, bloody stools, vomit blood, develop new weakness, slurred speech, confusion or have any other concerning symptoms call 911 or obtain transportation to nearest emergency room immediately  Sedating Medications with increased risk of complications: Your goal will be to wean off of opiate medications and then onto acetaminophen only and then off of acetaminophen as well if possible  Oxycodone (opiate) pain medication has been used to help your acute pain  You tolerated this medication adequately during your recent hospital stay  You will be given a limited supply of opiate pain medications on discharge; should you require additional refills/medications, your PCP and/or surgeon may prescribe at his/her discretion  This can be quite helpful particularly for severe acute pain  They can increase your risk of falling, injury, and breathing difficulties which can be severe and even life-threatening  Note it is advisable to limit use of opiate pain medications as they can become habit forming and lead to addiction  Gabapentin has been used to help pain  You tolerated this medication adequately during your recent hospital stay  - Do not take with alcohol (or marijuana/cannabis) while on this medication as this can cause increased confusion, breathing problems, falls, and severe injury  - This medication can increase risk of confusion, fall, and injury although you did tolerate adequately during rehab course  - Follow-up with your primary care physician within 1-2 weeks as well as neurosurgery for additional management of your medical conditions, potential refills, or adjustments of this medication  Please note a summary of your hospital stay with relevant information for your doctors will try to be sent to them  Please confirm with your doctors at your follow up visits that they have received this summary and have them contact [address] [place] [address] if they have not received them along with any other medical records they may require  Sanju Castillo Phone Number:  [phone]          Cervical Spinal Stenosis   WHAT YOU NEED TO KNOW:   Cervical spinal stenosis is narrowing of the spinal canal in your neck  Your spinal canal holds your spinal cord  When your spinal canal narrows, it may put pressure on your spinal cord  Cervical spinal stenosis is a chronic (long-term) condition  DISCHARGE INSTRUCTIONS:   Seek care immediately:   · You injure your neck, and your pain and symptoms worsen  · You have new or increased trouble walking  · You cannot control when you urinate or have a bowel movement  · You have more numbness, tingling, or weakness than before  Contact your healthcare provider if:   · Your pain does not get better or gets worse, even after you take medicines  · You have questions or concerns about your condition or care  Medicines:   You may need any of the following:  · NSAIDs , such as ibuprofen, help decrease swelling and pain  NSAIDs can cause stomach bleeding or kidney problems in certain people  If you take blood thinner medicine, always ask your healthcare provider if NSAIDs are safe for you  Always read the medicine label and follow directions  · Acetaminophen  decreases pain and fever  It is available without a doctor's order  Ask how much to take and how often to take it  Follow directions  Read the labels of all other medicines you are using to see if they also contain acetaminophen, or ask your doctor or pharmacist  Acetaminophen can cause liver damage if not taken correctly  Do not use more than 4 grams (4,000 milligrams) total of acetaminophen in one day  · Prescription pain medicine  may be given  Ask how to take this medicine safely  · Muscle relaxers  help decrease pain and muscle spasms  · Take your medicine as directed  Contact your healthcare provider if you think your medicine is not helping or if you have side effects  Tell him or her if you are allergic to any medicine  Keep a list of the medicines, vitamins, and herbs you take  Include the amounts, and when and why you take them  Bring the list or the pill bottles to follow-up visits  Carry your medicine list with you in case of an emergency  Manage your symptoms:   · Go to physical and occupational therapy  as directed  A physical therapist teaches you exercises to help improve movement and strength, and to decrease pain  An occupational therapist teaches you skills to help with your daily activities  · Apply heat on your neck for 20 to 30 minutes every 2 hours for as many days as directed  Heat helps decrease pain and muscle spasms  · Apply ice  on your neck for 15 to 20 minutes every hour or as directed  Use an ice pack, or put crushed ice in a plastic bag  Cover it with a towel before you apply it to your skin   Ice helps prevent tissue damage and decreases swelling and pain  · Avoid certain activities  Some activities may worsen your condition or cause more injury  Do not ride motorcycles or horses, climb ladders, or play football or other contact sports  Ask your provider which activities are safe for you to do  Follow up with your healthcare provider as directed:  Write down your questions so you remember to ask them during your visits  © Copyright [employer] 2022 Information is for End User's use only and may not be sold, redistributed or otherwise used for commercial purposes  All illustrations and images included in CareNotes® are the copyrighted property of [employer]  or [address] [place], [place] [id number] nevaeh   The above information is an  only  It is not intended as medical advice for individual conditions or treatments  Talk to your doctor, nurse or pharmacist before following any medical regimen to see if it is safe and effective for you

## 2022-03-29 NOTE — PLAN OF CARE
Problem: PAIN - ADULT  Goal: Verbalizes/displays adequate comfort level or baseline comfort level  Description: Interventions:  - Encourage patient to monitor pain and request assistance  - Assess pain using appropriate pain scale  - Administer analgesics based on type and severity of pain and evaluate response  - Implement non-pharmacological measures as appropriate and evaluate response  - Consider cultural and social influences on pain and pain management  - Notify physician/advanced practitioner if interventions unsuccessful or patient reports new pain  Outcome: Progressing     Problem: INFECTION - ADULT  Goal: Absence or prevention of progression during hospitalization  Description: INTERVENTIONS:  - Assess and monitor for signs and symptoms of infection  - Monitor lab/diagnostic results  - Monitor all insertion sites, i e  indwelling lines, tubes, and drains  - Monitor endotracheal if appropriate and nasal secretions for changes in amount and color  - [place] appropriate cooling/warming therapies per order  - Administer medications as ordered  - Instruct and encourage patient and family to use good hand hygiene technique  - Identify and instruct in appropriate isolation precautions for identified infection/condition  Outcome: Progressing  Goal: Absence of fever/infection during neutropenic period  Description: INTERVENTIONS:  - Monitor WBC    Outcome: Progressing     Problem: SAFETY ADULT  Goal: Patient will remain free of falls  Description: INTERVENTIONS:  - Educate patient/family on patient safety including physical limitations  - Instruct patient to call for assistance with activity   - Consult OT/PT to assist with strengthening/mobility   - Keep Call bell within reach  - Keep bed low and locked with side rails adjusted as appropriate  - Keep care items and personal belongings within reach  - Initiate and maintain comfort rounds  - Make Fall Risk Sign visible to staff  - Offer Toileting every  Hours, in advance of need  - Initiate/Maintain alarm  - Obtain necessary fall risk management equipment:   - Apply yellow socks and bracelet for high fall risk patients  - Consider moving patient to room near nurses station  Outcome: Progressing  Goal: Maintain or return to baseline ADL function  Description: INTERVENTIONS:  -  Assess patient's ability to carry out ADLs; assess patient's baseline for ADL function and identify physical deficits which impact ability to perform ADLs (bathing, care of mouth/teeth, toileting, grooming, dressing, etc )  - Assess/evaluate cause of self-care deficits   - Assess range of motion  - Assess patient's mobility; develop plan if impaired  - Assess patient's need for assistive devices and provide as appropriate  - Encourage maximum independence but intervene and supervise when necessary  - Involve family in performance of ADLs  - Assess for home care needs following discharge   - Consider OT consult to assist with ADL evaluation and planning for discharge  - Provide patient education as appropriate  Outcome: Progressing  Goal: Maintains/Returns to pre admission functional level  Description: INTERVENTIONS:  - Perform BMAT or MOVE assessment daily    - Set and communicate daily mobility goal to care team and patient/family/caregiver  - Collaborate with rehabilitation services on mobility goals if consulted  - Perform Range of Motion  times a day  - Reposition patient every  hours    - Dangle patient  times a day  - Stand patient  times a day  - Ambulate patient  times a day  - Out of bed to chair  times a day   - Out of bed for meals  times a day  - Out of bed for toileting  - Record patient progress and toleration of activity level   Outcome: Progressing     Problem: DISCHARGE PLANNING  Goal: Discharge to home or other facility with appropriate resources  Description: INTERVENTIONS:  - Identify barriers to discharge w/patient and caregiver  - Arrange for needed discharge resources and transportation as appropriate  - Identify discharge learning needs (meds, wound care, etc )  - Arrange for interpretive services to assist at discharge as needed  - Refer to Case Management Department for coordinating discharge planning if the patient needs post-hospital services based on physician/advanced practitioner order or complex needs related to functional status, cognitive ability, or social support system  Outcome: Progressing     Problem: Knowledge Deficit  Goal: Patient/family/caregiver demonstrates understanding of disease process, treatment plan, medications, and discharge instructions  Description: Complete learning assessment and assess knowledge base    Interventions:  - Provide teaching at level of understanding  - Provide teaching via preferred learning methods  Outcome: Progressing     Problem: Potential for Falls  Goal: Patient will remain free of falls  Description: INTERVENTIONS:  - Educate patient/family on patient safety including physical limitations  - Instruct patient to call for assistance with activity   - Consult OT/PT to assist with strengthening/mobility   - Keep Call bell within reach  - Keep bed low and locked with side rails adjusted as appropriate  - Keep care items and personal belongings within reach  - Initiate and maintain comfort rounds  - Make Fall Risk Sign visible to staff  - Offer Toileting every  Hours, in advance of need  - Initiate/Maintain alarm  - Obtain necessary fall risk management equipment:  - Apply yellow socks and bracelet for high fall risk patients  - Consider moving patient to room near nurses station  Outcome: Progressing

## 2022-03-29 NOTE — ASSESSMENT & PLAN NOTE
Mgmt per medicine team  Discharge recs:  Warfarin 2 5mg (hold tonight) > restart warfarin 3/30 at 2 5mg daily and adjust based on PT/INR   PT/INR Thursday (but could wait until Monday per medicine) and then next week with close OP follow-up with PCP  3/23 (5mg) > 3/24 2 64 (2 5mg) > 3/25 (2 5mg) > 3/26 (2 5mg) > 3/27 (2 5mg) > 3/28 3 15 (held) > 3/29 2 96 (Plan to hold tonight) > 3/30 resume 2 5mg qday (PT/INR 3/31 and then at discretion of PCP)  OP adjustments per PCP

## 2022-03-29 NOTE — PLAN OF CARE
Problem: PAIN - ADULT  Goal: Verbalizes/displays adequate comfort level or baseline comfort level  Description: Interventions:  - Encourage patient to monitor pain and request assistance  - Assess pain using appropriate pain scale  - Administer analgesics based on type and severity of pain and evaluate response  - Implement non-pharmacological measures as appropriate and evaluate response  - Consider cultural and social influences on pain and pain management  - Notify physician/advanced practitioner if interventions unsuccessful or patient reports new pain  Outcome: Adequate for Discharge     Problem: INFECTION - ADULT  Goal: Absence or prevention of progression during hospitalization  Description: INTERVENTIONS:  - Assess and monitor for signs and symptoms of infection  - Monitor lab/diagnostic results  - Monitor all insertion sites, i e  indwelling lines, tubes, and drains  - Monitor endotracheal if appropriate and nasal secretions for changes in amount and color  - [place] appropriate cooling/warming therapies per order  - Administer medications as ordered  - Instruct and encourage patient and family to use good hand hygiene technique  - Identify and instruct in appropriate isolation precautions for identified infection/condition  Outcome: Adequate for Discharge  Goal: Absence of fever/infection during neutropenic period  Description: INTERVENTIONS:  - Monitor WBC    Outcome: Adequate for Discharge     Problem: SAFETY ADULT  Goal: Patient will remain free of falls  Description: INTERVENTIONS:  - Educate patient/family on patient safety including physical limitations  - Instruct patient to call for assistance with activity   - Consult OT/PT to assist with strengthening/mobility   - Keep Call bell within reach  - Keep bed low and locked with side rails adjusted as appropriate  - Keep care items and personal belongings within reach  - Initiate and maintain comfort rounds  - Make Fall Risk Sign visible to staff  - Offer Toileting every  Hours, in advance of need  - Initiate/Maintain alarm  - Obtain necessary fall risk management equipment:   - Apply yellow socks and bracelet for high fall risk patients  - Consider moving patient to room near nurses station  Outcome: Adequate for Discharge  Goal: Maintain or return to baseline ADL function  Description: INTERVENTIONS:  -  Assess patient's ability to carry out ADLs; assess patient's baseline for ADL function and identify physical deficits which impact ability to perform ADLs (bathing, care of mouth/teeth, toileting, grooming, dressing, etc )  - Assess/evaluate cause of self-care deficits   - Assess range of motion  - Assess patient's mobility; develop plan if impaired  - Assess patient's need for assistive devices and provide as appropriate  - Encourage maximum independence but intervene and supervise when necessary  - Involve family in performance of ADLs  - Assess for home care needs following discharge   - Consider OT consult to assist with ADL evaluation and planning for discharge  - Provide patient education as appropriate  Outcome: Adequate for Discharge  Goal: Maintains/Returns to pre admission functional level  Description: INTERVENTIONS:  - Perform BMAT or MOVE assessment daily    - Set and communicate daily mobility goal to care team and patient/family/caregiver  - Collaborate with rehabilitation services on mobility goals if consulted  - Perform Range of Motion  times a day  - Reposition patient every  hours    - Dangle patient  times a day  - Stand patient  times a day  - Ambulate patient  times a day  - Out of bed to chair  times a day   - Out of bed for meals times a day  - Out of bed for toileting  - Record patient progress and toleration of activity level   Outcome: Adequate for Discharge     Problem: DISCHARGE PLANNING  Goal: Discharge to home or other facility with appropriate resources  Description: INTERVENTIONS:  - Identify barriers to discharge w/patient and caregiver  - Arrange for needed discharge resources and transportation as appropriate  - Identify discharge learning needs (meds, wound care, etc )  - Arrange for interpretive services to assist at discharge as needed  - Refer to Case Management Department for coordinating discharge planning if the patient needs post-hospital services based on physician/advanced practitioner order or complex needs related to functional status, cognitive ability, or social support system  Outcome: Adequate for Discharge     Problem: Knowledge Deficit  Goal: Patient/family/caregiver demonstrates understanding of disease process, treatment plan, medications, and discharge instructions  Description: Complete learning assessment and assess knowledge base    Interventions:  - Provide teaching at level of understanding  - Provide teaching via preferred learning methods  Outcome: Adequate for Discharge     Problem: Potential for Falls  Goal: Patient will remain free of falls  Description: INTERVENTIONS:  - Educate patient/family on patient safety including physical limitations  - Instruct patient to call for assistance with activity   - Consult OT/PT to assist with strengthening/mobility   - Keep Call bell within reach  - Keep bed low and locked with side rails adjusted as appropriate  - Keep care items and personal belongings within reach  - Initiate and maintain comfort rounds  - Make Fall Risk Sign visible to staff  - Offer Toileting every  Hours, in advance of need  - Initiate/Maintain alarm  - Obtain necessary fall risk management equipment:   - Apply yellow socks and bracelet for high fall risk patients  - Consider moving patient to room near nurses station  Outcome: Adequate for Discharge

## 2022-03-29 NOTE — ASSESSMENT & PLAN NOTE
Mgmt per IM  Recommend discharging on K-dur 40mEq daily x7 days  Repeat BMP later this week (or early next week)  Follow-up with PCP

## 2022-03-29 NOTE — TELEPHONE ENCOUNTER
Patient has upcoming virtual TCM on 04/05/2022  Discharge today and need labs by 03/31/2022 per discharge instructions  Need las prior due by 03/31/2022  INR, CBC, BMP, potassium    [facility]     Please view

## 2022-03-30 ENCOUNTER — TELEPHONE (OUTPATIENT)
Dept: NEUROSURGERY | Facility: CLINIC | Age: 72
End: 2022-03-30

## 2022-03-30 NOTE — TELEPHONE ENCOUNTER
Patient coming in for a visit tomorrow with [facility] for an incision check  Jeremie Sewell with patient and was sent photos of incision which are in patient's chart

## 2022-03-30 NOTE — CASE MANAGEMENT
Update 3/30/2022  [facility] has accepted the patient for PT and RN services and will be in contact with the patient to arrange  Patient was contacted and provided with the update  Both stated that they had a good transition home yesterday and had no other concerns at this time

## 2022-03-30 NOTE — CASE MANAGEMENT
Team d/c summary:   Pt made good rehab progress and returned home  He will receive home PT and RN services through [facility]  Pt was alert and oriented for d/c instructions

## 2022-03-30 NOTE — TELEPHONE ENCOUNTER
Patient's wife, Lawrence Devine, called the office with some concerns regarding the patient's incision  She reports the incision was draining red bloody drainage while he was in the hospital and in [facility]  Pictures are in the chart from just yesterday on 3/29/22  She reports the bloody drainage has now turned into a "mucous-like" drainage  It is actively draining and she noticed the change in fluid overnight  Denies any fevers, chills, or sweats  She reports the patient is feeling well  Patient has an appointment with Elizabeth Cuevas tomorrow  Requested for patient to send updated pictures of the incision to this RN's work email address  She will do so  Once received, will review and then go from there  She was appreciative

## 2022-03-30 NOTE — TELEPHONE ENCOUNTER
Discussed case with Vimal Stanley and Dr Jina Kowalski was present  They recommended for the patient to keep the appointment tomorrow morning with Vimal Stanley for a possible direct admit  Patient is to present to the ER if he develops any fevers, chills, sweats, lethargy, or if he does not feel well  Called Dorota George and notified her of the above  Expressed the importance  She expressed understanding and was appreciative  She will keep a close eye on him and will try to keep the incision clean

## 2022-03-31 ENCOUNTER — HOSPITAL ENCOUNTER (INPATIENT)
Facility: HOSPITAL | Age: 72
LOS: 15 days | Discharge: HOME HEALTH | DRG: 501 | End: 2022-04-15
Attending: NEUROLOGICAL SURGERY | Admitting: NEUROLOGICAL SURGERY
Payer: COMMERCIAL

## 2022-03-31 ENCOUNTER — APPOINTMENT (INPATIENT)
Dept: RADIOLOGY | Facility: HOSPITAL | Age: 72
DRG: 501 | End: 2022-03-31
Payer: COMMERCIAL

## 2022-03-31 ENCOUNTER — OFFICE VISIT (OUTPATIENT)
Dept: NEUROSURGERY | Facility: CLINIC | Age: 72
End: 2022-03-31

## 2022-03-31 ENCOUNTER — TELEPHONE (OUTPATIENT)
Dept: NEUROSURGERY | Facility: CLINIC | Age: 72
End: 2022-03-31

## 2022-03-31 VITALS
TEMPERATURE: 96.4 F | WEIGHT: 235 LBS | RESPIRATION RATE: 16 BRPM | HEIGHT: 71 IN | HEART RATE: 68 BPM | DIASTOLIC BLOOD PRESSURE: 70 MMHG | BODY MASS INDEX: 32.9 KG/M2 | SYSTOLIC BLOOD PRESSURE: 120 MMHG

## 2022-03-31 DIAGNOSIS — L08.9 WOUND INFECTION: Primary | ICD-10-CM

## 2022-03-31 DIAGNOSIS — D68.51 FACTOR V LEIDEN MUTATION (HCC): ICD-10-CM

## 2022-03-31 DIAGNOSIS — Z79.01 WARFARIN ANTICOAGULATION: ICD-10-CM

## 2022-03-31 DIAGNOSIS — T81.49XA WOUND INFECTION AFTER SURGERY: ICD-10-CM

## 2022-03-31 DIAGNOSIS — T14.8XXA WOUND INFECTION: Primary | ICD-10-CM

## 2022-03-31 DIAGNOSIS — Z98.890 POST-OPERATIVE STATE: ICD-10-CM

## 2022-03-31 DIAGNOSIS — G95.9 CERVICAL MYELOPATHY (HCC): ICD-10-CM

## 2022-03-31 DIAGNOSIS — Z98.1 STATUS POST CERVICAL SPINAL FUSION: ICD-10-CM

## 2022-03-31 DIAGNOSIS — T81.49XA SURGICAL SITE INFECTION: ICD-10-CM

## 2022-03-31 DIAGNOSIS — I48.0 PAROXYSMAL A-FIB (HCC): Chronic | ICD-10-CM

## 2022-03-31 DIAGNOSIS — T81.41XA INFECTION OF SUPERFICIAL INCISIONAL SURGICAL SITE AFTER PROCEDURE, INITIAL ENCOUNTER: Primary | ICD-10-CM

## 2022-03-31 LAB
ANION GAP SERPL CALCULATED.3IONS-SCNC: 5 MMOL/L (ref 4–13)
APTT PPP: 50 SECONDS (ref 23–37)
BASOPHILS # BLD AUTO: 0.03 THOUSANDS/ΜL (ref 0–0.1)
BASOPHILS NFR BLD AUTO: 0 % (ref 0–1)
BUN SERPL-MCNC: 13 MG/DL (ref 5–25)
CALCIUM SERPL-MCNC: 9.8 MG/DL (ref 8.3–10.1)
CHLORIDE SERPL-SCNC: 98 MMOL/L (ref 100–108)
CO2 SERPL-SCNC: 31 MMOL/L (ref 21–32)
CREAT SERPL-MCNC: 0.64 MG/DL (ref 0.6–1.3)
EOSINOPHIL # BLD AUTO: 0.28 THOUSAND/ΜL (ref 0–0.61)
EOSINOPHIL NFR BLD AUTO: 4 % (ref 0–6)
ERYTHROCYTE [DISTWIDTH] IN BLOOD BY AUTOMATED COUNT: 13.2 % (ref 11.6–15.1)
GFR SERPL CREATININE-BSD FRML MDRD: 98 ML/MIN/1.73SQ M
GLUCOSE SERPL-MCNC: 101 MG/DL (ref 65–140)
HCT VFR BLD AUTO: 32 % (ref 36.5–49.3)
HGB BLD-MCNC: 10.4 G/DL (ref 12–17)
IMM GRANULOCYTES # BLD AUTO: 0.09 THOUSAND/UL (ref 0–0.2)
IMM GRANULOCYTES NFR BLD AUTO: 1 % (ref 0–2)
INR PPP: 2.37 (ref 0.84–1.19)
LYMPHOCYTES # BLD AUTO: 1.28 THOUSANDS/ΜL (ref 0.6–4.47)
LYMPHOCYTES NFR BLD AUTO: 19 % (ref 14–44)
MCH RBC QN AUTO: 30.5 PG (ref 26.8–34.3)
MCHC RBC AUTO-ENTMCNC: 32.5 G/DL (ref 31.4–37.4)
MCV RBC AUTO: 94 FL (ref 82–98)
MONOCYTES # BLD AUTO: 0.41 THOUSAND/ΜL (ref 0.17–1.22)
MONOCYTES NFR BLD AUTO: 6 % (ref 4–12)
NEUTROPHILS # BLD AUTO: 4.77 THOUSANDS/ΜL (ref 1.85–7.62)
NEUTS SEG NFR BLD AUTO: 70 % (ref 43–75)
NRBC BLD AUTO-RTO: 0 /100 WBCS
PLATELET # BLD AUTO: 365 THOUSANDS/UL (ref 149–390)
PMV BLD AUTO: 8.7 FL (ref 8.9–12.7)
POTASSIUM SERPL-SCNC: 4 MMOL/L (ref 3.5–5.3)
PROTHROMBIN TIME: 24.7 SECONDS (ref 11.6–14.5)
RBC # BLD AUTO: 3.41 MILLION/UL (ref 3.88–5.62)
SODIUM SERPL-SCNC: 134 MMOL/L (ref 136–145)
WBC # BLD AUTO: 6.86 THOUSAND/UL (ref 4.31–10.16)

## 2022-03-31 PROCEDURE — 99024 POSTOP FOLLOW-UP VISIT: CPT | Performed by: PHYSICIAN ASSISTANT

## 2022-03-31 PROCEDURE — 85610 PROTHROMBIN TIME: CPT | Performed by: PHYSICIAN ASSISTANT

## 2022-03-31 PROCEDURE — 99024 POSTOP FOLLOW-UP VISIT: CPT | Performed by: NURSE PRACTITIONER

## 2022-03-31 PROCEDURE — 72126 CT NECK SPINE W/DYE: CPT

## 2022-03-31 PROCEDURE — 80048 BASIC METABOLIC PNL TOTAL CA: CPT | Performed by: PHYSICIAN ASSISTANT

## 2022-03-31 PROCEDURE — G1004 CDSM NDSC: HCPCS

## 2022-03-31 PROCEDURE — 87040 BLOOD CULTURE FOR BACTERIA: CPT | Performed by: PHYSICIAN ASSISTANT

## 2022-03-31 PROCEDURE — 99284 EMERGENCY DEPT VISIT MOD MDM: CPT

## 2022-03-31 PROCEDURE — 85025 COMPLETE CBC W/AUTO DIFF WBC: CPT | Performed by: PHYSICIAN ASSISTANT

## 2022-03-31 PROCEDURE — 87186 SC STD MICRODIL/AGAR DIL: CPT | Performed by: INTERNAL MEDICINE

## 2022-03-31 PROCEDURE — 87070 CULTURE OTHR SPECIMN AEROBIC: CPT | Performed by: INTERNAL MEDICINE

## 2022-03-31 PROCEDURE — 99223 1ST HOSP IP/OBS HIGH 75: CPT | Performed by: INTERNAL MEDICINE

## 2022-03-31 PROCEDURE — 87205 SMEAR GRAM STAIN: CPT | Performed by: INTERNAL MEDICINE

## 2022-03-31 PROCEDURE — 3008F BODY MASS INDEX DOCD: CPT | Performed by: ORTHOPAEDIC SURGERY

## 2022-03-31 PROCEDURE — 85730 THROMBOPLASTIN TIME PARTIAL: CPT | Performed by: PHYSICIAN ASSISTANT

## 2022-03-31 PROCEDURE — 87077 CULTURE AEROBIC IDENTIFY: CPT | Performed by: INTERNAL MEDICINE

## 2022-03-31 RX ORDER — DULOXETIN HYDROCHLORIDE 60 MG/1
60 CAPSULE, DELAYED RELEASE ORAL DAILY
Status: DISCONTINUED | OUTPATIENT
Start: 2022-03-31 | End: 2022-04-15 | Stop reason: HOSPADM

## 2022-03-31 RX ORDER — METOPROLOL SUCCINATE 100 MG/1
100 TABLET, EXTENDED RELEASE ORAL DAILY
Status: DISCONTINUED | OUTPATIENT
Start: 2022-03-31 | End: 2022-04-15 | Stop reason: HOSPADM

## 2022-03-31 RX ORDER — PRAVASTATIN SODIUM 40 MG
40 TABLET ORAL DAILY
Status: DISCONTINUED | OUTPATIENT
Start: 2022-03-31 | End: 2022-04-15 | Stop reason: HOSPADM

## 2022-03-31 RX ORDER — AMLODIPINE BESYLATE 5 MG/1
5 TABLET ORAL DAILY
Status: DISCONTINUED | OUTPATIENT
Start: 2022-03-31 | End: 2022-04-15 | Stop reason: HOSPADM

## 2022-03-31 RX ORDER — GABAPENTIN 300 MG/1
300 CAPSULE ORAL
Status: DISCONTINUED | OUTPATIENT
Start: 2022-03-31 | End: 2022-04-15 | Stop reason: HOSPADM

## 2022-03-31 RX ORDER — DOCUSATE SODIUM 100 MG/1
100 CAPSULE, LIQUID FILLED ORAL 2 TIMES DAILY
Status: DISCONTINUED | OUTPATIENT
Start: 2022-03-31 | End: 2022-04-07

## 2022-03-31 RX ORDER — FLECAINIDE ACETATE 100 MG/1
100 TABLET ORAL 2 TIMES DAILY
Status: DISCONTINUED | OUTPATIENT
Start: 2022-03-31 | End: 2022-04-15 | Stop reason: HOSPADM

## 2022-03-31 RX ORDER — OXYCODONE HYDROCHLORIDE 5 MG/1
5 TABLET ORAL EVERY 4 HOURS PRN
Status: DISCONTINUED | OUTPATIENT
Start: 2022-03-31 | End: 2022-04-15 | Stop reason: HOSPADM

## 2022-03-31 RX ORDER — POTASSIUM CHLORIDE 20 MEQ/1
40 TABLET, EXTENDED RELEASE ORAL DAILY
Status: DISCONTINUED | OUTPATIENT
Start: 2022-03-31 | End: 2022-04-15 | Stop reason: HOSPADM

## 2022-03-31 RX ORDER — ACETAMINOPHEN 325 MG/1
1000 TABLET ORAL 3 TIMES DAILY PRN
Status: DISCONTINUED | OUTPATIENT
Start: 2022-03-31 | End: 2022-04-06

## 2022-03-31 RX ORDER — GABAPENTIN 100 MG/1
100 CAPSULE ORAL DAILY
Status: DISCONTINUED | OUTPATIENT
Start: 2022-03-31 | End: 2022-04-15 | Stop reason: HOSPADM

## 2022-03-31 RX ORDER — SODIUM CHLORIDE 9 MG/ML
125 INJECTION, SOLUTION INTRAVENOUS CONTINUOUS
Status: DISCONTINUED | OUTPATIENT
Start: 2022-03-31 | End: 2022-04-02

## 2022-03-31 RX ORDER — SENNOSIDES 8.6 MG
8.6 TABLET ORAL DAILY
Status: DISCONTINUED | OUTPATIENT
Start: 2022-03-31 | End: 2022-04-15 | Stop reason: HOSPADM

## 2022-03-31 RX ORDER — ONDANSETRON 2 MG/ML
4 INJECTION INTRAMUSCULAR; INTRAVENOUS EVERY 6 HOURS PRN
Status: DISCONTINUED | OUTPATIENT
Start: 2022-03-31 | End: 2022-04-15 | Stop reason: HOSPADM

## 2022-03-31 RX ORDER — OXYCODONE HYDROCHLORIDE 5 MG/1
2.5 TABLET ORAL EVERY 4 HOURS PRN
Status: DISCONTINUED | OUTPATIENT
Start: 2022-03-31 | End: 2022-04-15 | Stop reason: HOSPADM

## 2022-03-31 RX ORDER — TAMSULOSIN HYDROCHLORIDE 0.4 MG/1
0.4 CAPSULE ORAL
Status: DISCONTINUED | OUTPATIENT
Start: 2022-03-31 | End: 2022-04-15 | Stop reason: HOSPADM

## 2022-03-31 RX ADMIN — CEFEPIME HYDROCHLORIDE 2000 MG: 2 INJECTION, POWDER, FOR SOLUTION INTRAVENOUS at 19:16

## 2022-03-31 RX ADMIN — SODIUM CHLORIDE 125 ML/HR: 0.9 INJECTION, SOLUTION INTRAVENOUS at 17:17

## 2022-03-31 RX ADMIN — OXYCODONE HYDROCHLORIDE 5 MG: 5 TABLET ORAL at 19:22

## 2022-03-31 RX ADMIN — POTASSIUM CHLORIDE 40 MEQ: 20 TABLET, EXTENDED RELEASE ORAL at 17:13

## 2022-03-31 RX ADMIN — VANCOMYCIN HYDROCHLORIDE 1750 MG: 5 INJECTION, POWDER, LYOPHILIZED, FOR SOLUTION INTRAVENOUS at 20:15

## 2022-03-31 RX ADMIN — GABAPENTIN 100 MG: 100 CAPSULE ORAL at 16:22

## 2022-03-31 RX ADMIN — DOCUSATE SODIUM 100 MG: 100 CAPSULE, LIQUID FILLED ORAL at 17:13

## 2022-03-31 RX ADMIN — METOPROLOL SUCCINATE 100 MG: 100 TABLET, EXTENDED RELEASE ORAL at 16:22

## 2022-03-31 RX ADMIN — DULOXETINE HYDROCHLORIDE 60 MG: 60 CAPSULE, DELAYED RELEASE ORAL at 17:13

## 2022-03-31 RX ADMIN — AMLODIPINE BESYLATE 5 MG: 5 TABLET ORAL at 16:22

## 2022-03-31 RX ADMIN — PRAVASTATIN SODIUM 40 MG: 40 TABLET ORAL at 16:22

## 2022-03-31 RX ADMIN — TAMSULOSIN HYDROCHLORIDE 0.4 MG: 0.4 CAPSULE ORAL at 16:23

## 2022-03-31 RX ADMIN — IOHEXOL 100 ML: 350 INJECTION, SOLUTION INTRAVENOUS at 15:23

## 2022-03-31 RX ADMIN — FLECAINIDE ACETATE 100 MG: 100 TABLET ORAL at 18:57

## 2022-03-31 RX ADMIN — GABAPENTIN 300 MG: 300 CAPSULE ORAL at 21:22

## 2022-03-31 NOTE — ED NOTES
Reached out to Neurosurg again  Per NS, "still seeing pts on the floors  Will try to get to him as soon as possible " RN requested pain meds for pt and alerted that pt is overdue for Abx       Arslan Meza RN  03/31/22 5113

## 2022-03-31 NOTE — ASSESSMENT & PLAN NOTE
· As addressed in HPI  · Post operative surgical wound infection posterior cervical area   · Posterior cervical area  copius amounts of sero bloody purulent thick mucoid drainage oozing from multiple staple insertion sites ,several cm of surrounding erythremia, fluctuance  Refer to photo attachments  · Redressed posterior cervical incision cleanse area with sterile NSS apply several sterile 4x4 then ABD pad anchored with several strips of paper tape  · There are no signs of systemic toxicity is afebrile/subnormal 96 4, no hypotension , pulse rate no tachycardic, is alert and oriented  Old dressings saturated with drainage as described above  · Remains on warfarin treating for Factor V Leiden disease  · Dr Yuri Capellan notified of patient's status this morning and plan   · Son had multiple questions during visit --answered all to his satisfaction  · Wearing Aspen cervical collar  · In no acute distress  · Reports last night had bladder incontinence while walking to the bathroom  · Pain cervical 3-4/10, left cervical area  Treating with acetaminophen avoiding oxycodone  · Son reports patient has been experiencing muscle spasm describing cervical paraspinal areas , reviewed AVS , no antispasmodic prescribed at d/c    · Reports occasional paresthesia on top og head , places a cool cloth on top for comfort   Explained I haveno clear explanation other than a residual effect form surgery secondary to nerve root inflammation, posterior cervical fusion starts at C 3     · Reports a rash on lower extremities  PLAN  · Direct admit , completed ADT no beds will go through ED  · Likely OR for washout after INR normalized, chronic warfarin use

## 2022-03-31 NOTE — CONSULTS
Consultation - Infectious Disease   Karine Wei 71 y o  male MRN: [id number]  Unit/Bed#: [facility] 577-97 Encounter: [id number]      Inpatient consult to Infectious Diseases  Consult performed by: Luma Espino MD  Consult ordered by: Belinda Johnson PA-C          IMPRESSION & RECOMMENDATIONS:   Impression:  1  Postoperative cervical wound infection R/0 vertebral osteomyelitis  2  S/P  anterior cervical diskectomy and fixation fusion C5-6 and C6-7 posterior cervical decompression and instrumented fusion C3-T1 on 03/11/22  3  History of PAF on Coumadin  4  Factor 5 Leiden mutation  5  WILL  Recommendations:    Discuss with the primary service  1  Aerobic and anaerobic cultures taken of the deep sinus tracts with purulent material  2  Pending above we will begin vancomycin 1 5 g q 12 hours IV with further dosing by pharmacy and cefepime 2 g q 12 hours IV  3  Agree that patient will need further surgical washout once he is cleared can safely undergo the procedure with his anticoagulation corrected  4  Patient was told that he will likely need at least 4 weeks of of IV antibiotics followed by p o  antibiotics  Discussed with the patient and his son Rob Luz who was at bedside      HISTORY OF PRESENT ILLNESS:    Reason for Consult:  Wound infection  HPI: Karine Wei is a 71y o  year old male with a history on 3/11/22 underwent anterior cervical diskectomy and fixation fusion C5-6 and C6-7 posterior cervical decompression and instrumented fusion C3-T1 who was referred for admission today when an office visit revealed concerning signs of postoperative surgical incision infection that included copious amounts of sero purulent mucoid drainage oozing from multiple sites  Neurosurgery states that he will likely need operative washout after his INR is normalized  Patient has additional past history of PAF on Coumadin, factor 5 Leiden mutation, WILL    After his operative admission from 3/5/22-3/18/22 he was discharged to the acute rehab center where he remained until 3/29/22  During that admission his wound was noted to have erythema and some serosanguineous drainage which was felt to be a hematoma and a superficial infection  The patient was placed on 7 days of cephalexin p o  therapy  Patient had a bout of cellulitis in his left leg in August, 2021 for which he was treated with oral antibiotics and a remote history of colon surgery for infection  A CT of the spine done today shows a large posterior soft tissue fluid collection with suspicious morphology for infection  Review of Systems positive findings include no neurologic deficit for pain or fever but has the wound redness with mucus like drainage  , some current pain in his left jaw with wide opening of his mouth that began today, use of CPAP to sleep, uses hearing aids  A [id number] point system-based review of systems is otherwise negative  PAST MEDICAL HISTORY:  Past Medical History:   Diagnosis Date    Acute venous embolism and thrombosis of deep vessels of proximal lower extremity (HCC)     Last assessed: 5/18/15    Arthritis     Cellulitis     LE    CPAP (continuous positive airway pressure) dependence     Factor V Leiden ([facility] [address] )     Forgetfulness     [place] (hard of hearing)     Paroxysmal atrial fibrillation (HCC)     Last assessed: 11/2/15    Sleep apnea      Past Surgical History:   Procedure Laterality Date    BACK SURGERY      Lower    COLON SURGERY      COLONOSCOPY      [place] ARTHRODESIS ANT INTERBODY MIN DISCECTOMY, CERVICAL BELOW C2 N/A 3/11/2022    Procedure: Anterior cervical discectomy and fixation fusion C5/6 and C6/7; Posterior cervical decompression and instrumented fusion C3-T1;   Surgeon: Trisha Cosme MD;  Location: [facility];  Service: Neurosurgery       FAMILY HISTORY:  Non-contributory    SOCIAL HISTORY:  Social History   /Civil Union  Social History     Substance and Sexual Activity   Alcohol Use Not Currently Social History     Substance and Sexual Activity   Drug Use No     Social History     Tobacco Use   Smoking Status Never Smoker   Smokeless Tobacco Never Used       ALLERGIES:  Allergies   Allergen Reactions    Morphine GI Intolerance    Vitamin K Other (See Comments)     On coumadin-patient sensitive to vitamin K amounts in meds etc        MEDICATIONS:  All current active medications have been reviewed  PHYSICAL EXAM:  Temp:  [96 4 °F (35 8 °C)-98 7 °F (37 1 °C)] 98 7 °F (37 1 °C)  HR:  [62-69] 62  Resp:  [16-19] 19  BP: (120-153)/(70-88) 153/88  SpO2:  [95 %-97 %] 96 %  Temp (24hrs), Av 7 °F (36 5 °C), Min:96 4 °F (35 8 °C), Max:98 7 °F (37 1 °C)  Current: Temperature: 98 7 °F (37 1 °C)  No intake or output data in the 24 hours ending 22 1752    General Appearance:  Appearing well, nontoxic, and in no distress, appears stated age   Head:  Normocephalic, without obvious abnormality, atraumatic   Eyes:  PERRL, conjunctiva pale and sclera anicteric, both eyes   Nose: Nares normal, mucosa normal, no drainage   Throat: Oropharynx moist without lesions; lips, mucosa, and tongue normal; partial denture   Neck: Pritchett collar, cervical wound with marked surrounding erythema and areas of pustule formation with sinus tracks and sanguinopurulent discharge    (Neurosurgery picture on chart)   Back:   As above   Lungs:   Clear to auscultation bilaterally, no audible wheezes, rhonchi and rales, respirations unlabored   Chest Wall:  No tenderness or deformity   Heart:  Regular rate and rhythm, S1, S2 normal, no murmur, rub or gallop   Abdomen:   Soft, non-tender, abdominal scar non-distended, positive bowel sounds, no masses, no organomegaly    No CVA tenderness   Extremities: Extremities normal, atraumatic, no cyanosis, clubbing or edema   Skin: Cervical wound as above, surgical scar   Neurologic: Alert and oriented times 3, extremity strength 5/5 and symmetric           Invasive Devices:   Peripheral IV 03/31/22 Right Forearm (Active)   Site Assessment WDL 03/31/22 1126   Dressing Type Transparent 03/31/22 1126   Line Status Blood return noted; Flushed 03/31/22 1126   Dressing Status Clean;Dry; Intact 03/31/22 1126       LABS, IMAGING, & OTHER STUDIES:  Lab Results:      I have personally reviewed pertinent labs  Results from last 7 days   Lab Units 03/31/22  1618 03/29/22  0606 03/28/22  0501   WBC Thousand/uL 6 86 8 31 8 76   HEMOGLOBIN g/dL 10 4* 9 4* 9 5*   PLATELETS Thousands/uL 365 291 273     Results from last 7 days   Lab Units 03/31/22  1618 03/29/22  0606 03/29/22  0606 03/28/22  0501 03/28/22  0501   SODIUM mmol/L 134*  --  134*  --  135*   POTASSIUM mmol/L 4 0   < > 3 2*   < > 3 1*   CHLORIDE mmol/L 98*   < > 99*   < > 97*   CO2 mmol/L 31   < > 31   < > 31   BUN mg/dL 13   < > 11   < > 14   CREATININE mg/dL 0 64   < > 0 53*   < > 0 65   EGFR ml/min/1 73sq m 98   < > 106   < > 97   CALCIUM mg/dL 9 8   < > 9 0   < > 9 1    < > = values in this interval not displayed  Imaging Studies:   I have personally reviewed pertinent imaging study reports and images in PACS  EKG, Pathology, and Other Studies:   I have personally reviewed pertinent reports

## 2022-03-31 NOTE — PROGRESS NOTES
Assessment/Plan:    Status post cervical spinal fusion  · As addressed in HPI  · ACDF incision with overlying steri strips , lifting at edges , did not remove , appears to be dried scabbing in some areas  No signs of infection   · Refer to photo attachment   · Wearing Aspen cervical collar   · Admits has replacement liners that wife regularly cleanses            PLAN  · Do not remove steri strips , allow to fall off        Infection of superficial incisional surgical site after procedure  · As addressed in HPI  · Post operative surgical wound infection posterior cervical area   · Posterior cervical area  copius amounts of sero bloody purulent thick mucoid drainage oozing from multiple staple insertion sites ,several cm of surrounding erythremia, fluctuance  Refer to photo attachments  · Redressed posterior cervical incision cleanse area with sterile NSS apply several sterile 4x4 then ABD pad anchored with several strips of paper tape  · There are no signs of systemic toxicity is afebrile/subnormal 96 4, no hypotension , pulse rate no tachycardic, is alert and oriented  Old dressings saturated with drainage as described above  · Remains on warfarin treating for Factor V Leiden disease  · Dr Zeny Comer notified of patient's status this morning and plan   · Son had multiple questions during visit --answered all to his satisfaction  · Wearing Aspen cervical collar  · In no acute distress  · Reports last night had bladder incontinence while walking to the bathroom  · Pain cervical 3-4/10, left cervical area  Treating with acetaminophen avoiding oxycodone  · Son reports patient has been experiencing muscle spasm describing cervical paraspinal areas , reviewed AVS , no antispasmodic prescribed at d/c    · Reports occasional paresthesia on top og head , places a cool cloth on top for comfort    Explained I haveno clear explanation other than a residual effect form surgery secondary to nerve root inflammation, posterior cervical fusion starts at C 3     · Reports a rash on lower extremities  PLAN  · Direct admit , completed ADT no beds will go through ED  · Likely OR for washout after INR normalized, chronic warfarin use  Subjective: 2 week post op visit    Patient ID: Debbie Florentino is a 71 y o  male     HPI   Status post surgery with Dr Claudia Bhatia 3/11/2022 Anterior cervical discectomy and fixation fusion C5/6 and C6/7; Posterior cervical decompression and instrumented fusion C3-T1 (N/A Spine Cervical)  He has a history 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  Presents today for 2 week post op office visit with concerning signs of post operative surgical incision infection Posterior cervical incision   Refer to photo attachments  Patient with copius amounts of sero bloody purulent thick mucoid drainage oozing from multiple staple insertion sites ,several cm of surrounding erythremia, fluctuance  There are no signs of systemic toxicity  Is afebrile , pulse rat not tachycardic, he is alert and oriented  Old dressings (Maxorb, gauze gauze  And inclusive type adherent outer dressing)  saturated with drainage as described above  Son reports patient continues treating with cephalexin since discharge from [facility] 3/29/2022  Reports [facility] services did have not start since discharge   Wife telephoned nurse-line yesterday with some concerns regarding the patient's incision  She reports the incision was draining red bloody drainage while he was in the hospital and in [facility]  Photos attached in note 3/30/22  Actively draining mucoid type drainage, thick , bloody  Patient was afebrile   No mental status changes and he has appointment scheduled in office today   Case discussed with Dr Horowitz  there was consensus to keep appointment for today and direct admit from office after assessment for surgical washout       Wife sent photos over night attached in note today show mucoid bloody purulent drainage from posterior cervical incision, surrounding tissue with inflamed cellulitis appearance  Of note he reports having a cellulitis infection of his legs  Before surgery  He presented today for 2 week PO visit  I have spent 35 minutes with patient today in which greater than 50% of this time was spent in assessment, examination, impressions, reviewing recommendations for care  All questions were answered to the patients satisfaction and understanding, contact information provided in the event additional questions arise  Patient acknowledged an understanding and agreement with plan         REVIEW OF SYSTEMS  Review of Systems   HENT: Positive for hearing loss (wears B/L hearing aids)  Genitourinary: Positive for urgency (returned last night and was unable to make it to bathroom in time)  Musculoskeletal: Positive for gait problem (using rolling walker), myalgias (neck), neck pain (left side, not incision) and neck stiffness  Skin: Positive for rash (left leg) and wound (here for surgical incision check)  Neurological: Positive for weakness (R>L leg) and headaches (paresthesias on top of head, occasional HA)  Hematological: Bruises/bleeds easily  All other systems reviewed and are negative  Meds/Allergies     No current facility-administered medications for this visit  No current outpatient medications on file       Facility-Administered Medications Ordered in Other Visits   Medication Dose Route Frequency Provider Last Rate Last Admin    acetaminophen (TYLENOL) tablet 975 mg  975 mg Oral TID PRN Alisno Faria PA-C        amLODIPine (NORVASC) tablet 5 mg  5 mg Oral Daily Alison Faria PA-C   5 mg at 03/31/22 1622    cefepime (MAXIPIME) 2,000 mg in dextrose 5 % 50 mL IVPB  2,000 mg Intravenous Q12H Krystal Irizarry  mL/hr at 03/31/22 1916 2,000 mg at 03/31/22 1916    docusate sodium (COLACE) capsule 100 mg  100 mg Oral BID Alison Biundo, PA-C   100 mg at 03/31/22 1713    DULoxetine (CYMBALTA) delayed release capsule 60 mg  60 mg Oral Daily Alison Giana, PA-C   60 mg at 03/31/22 1713    flecainide (TAMBOCOR) tablet 100 mg  100 mg Oral BID Alison Giana, PA-C   100 mg at 03/31/22 1857    gabapentin (NEURONTIN) capsule 100 mg  100 mg Oral Daily Alison Biluz elenao, PA-C   100 mg at 03/31/22 1622    gabapentin (NEURONTIN) capsule 300 mg  300 mg Oral HS Alisonroxanne Faria PA-C        metoprolol succinate (TOPROL-XL) 24 hr tablet 100 mg  100 mg Oral Daily Alison Biundo, PA-C   100 mg at 03/31/22 1622    ondansetron (ZOFRAN) injection 4 mg  4 mg Intravenous Q6H PRN Alison Faria PA-C        oxyCODONE (ROXICODONE) IR tablet 2 5 mg  2 5 mg Oral Q4H PRN Alison Giana, PA-C        oxyCODONE (ROXICODONE) IR tablet 5 mg  5 mg Oral Q4H PRN Alisonceli Faria PA-C   5 mg at 03/31/22 1922    potassium chloride (K-DUR,KLOR-CON) CR tablet 40 mEq  40 mEq Oral Daily Alison Biundo, PA-C   40 mEq at 03/31/22 1713    pravastatin (PRAVACHOL) tablet 40 mg  40 mg Oral Daily Alisonceli Faria PA-C   40 mg at 03/31/22 1622    senna (SENOKOT) tablet 8 6 mg  8 6 mg Oral Daily Alison Giana, PA-C        sodium chloride 0 9 % infusion  125 mL/hr Intravenous Continuous Alison Giana, PA-C 125 mL/hr at 03/31/22 1717 125 mL/hr at 03/31/22 1717    tamsulosin (FLOMAX) capsule 0 4 mg  0 4 mg Oral Daily With Dinner Alisonrasheeda Faria PA-C   0 4 mg at 03/31/22 1623    vancomycin (VANCOCIN) 1,750 mg in sodium chloride 0 9 % 500 mL IVPB  20 mg/kg (Adjusted) Intravenous Q12H Caludia Johnson MD           Allergies   Allergen Reactions    Morphine GI Intolerance    Vitamin K Other (See Comments)     On coumadin-patient sensitive to vitamin K amounts in meds etc        PAST HISTORY    Past Medical History:   Diagnosis Date    Acute venous embolism and thrombosis of deep vessels of proximal lower extremity ([address] ) Last assessed: 5/18/15    Arthritis     Cellulitis     LE    CPAP (continuous positive airway pressure) dependence     Factor V Leiden ([address] )     Forgetfulness     [place] (hard of hearing)     Paroxysmal atrial fibrillation (HCC)     Last assessed: 11/2/15    Sleep apnea        Past Surgical History:   Procedure Laterality Date    BACK SURGERY      Lower    COLON SURGERY      COLONOSCOPY      [place] ARTHRODESIS ANT INTERBODY MIN DISCECTOMY, CERVICAL BELOW C2 N/A 3/11/2022    Procedure: Anterior cervical discectomy and fixation fusion C5/6 and C6/7; Posterior cervical decompression and instrumented fusion C3-T1; Surgeon: Sanya Iqbal MD;  Location: [facility];  Service: Neurosurgery       Social History     Tobacco Use    Smoking status: Never Smoker    Smokeless tobacco: Never Used   Vaping Use    Vaping Use: Never used   Substance Use Topics    Alcohol use: Not Currently    Drug use: No       Family History   Problem Relation Age of Onset    Lung cancer Mother     No Known Problems Father     Heart attack Brother     Atrial fibrillation Brother     Emphysema Sister        The following portions of the patient's history were reviewed and updated as appropriate: allergies, current medications, past family history, past medical history, past social history, past surgical history and problem list       EXAM    Vitals:Blood pressure 120/70, pulse 68, temperature (!) 96 4 °F (35 8 °C), temperature source Tympanic, resp  rate 16, height 5' 11" (1 803 m), weight 107 kg (235 lb)  ,Body mass index is 32 78 kg/m²  Physical Exam  Vitals reviewed  Exam conducted with a chaperone present (son)  Constitutional:       Appearance: He is normal weight  HENT:      Head: Normocephalic and atraumatic  Eyes:      General: No scleral icterus  Right eye: No discharge  Left eye: No discharge     Neck:      Comments: Limitation cervical ROM , wearing ASPEN Collar   Pulmonary:      Effort: Pulmonary effort is normal  No respiratory distress  Musculoskeletal:      Right lower leg: Edema (trace ) present  Left lower leg: Edema (trace) present  Skin:     General: Skin is warm and dry  Neurological:      General: No focal deficit present  Mental Status: He is alert and oriented to person, place, and time  Motor: Weakness present  Gait: Gait abnormal    Psychiatric:         Mood and Affect: Mood normal          Behavior: Behavior normal          Neurologic Exam     Mental Status   Oriented to person, place, and time  Level of consciousness: alert    Motor Exam     Strength   Right wrist flexion: 4/5  Left wrist flexion: 4/5  Right wrist extension: 4/5  Left wrist extension: 4/5  Right interossei: 4/5  Left interossei: 4/5  Right iliopsoas: 4/5  Left iliopsoas: 4/5  Right quadriceps: 4/5  Left quadriceps: 4/5  Right hamstrin/5  Left hamstrin/5  Right anterior tibial: 4/5  Left anterior tibial: 4/5  Right gastroc: 4/5  Left gastroc: 4/5    Gait, Coordination, and Reflexes     Gait  Gait: (slightly stooped forward posture, use standard walker )      Imaging Studies       XR spine cervical 2 or 3 vw injury    Result Date: 3/21/2022  Narrative: C-ARM - intraoperative fluoroscopic guidance for cervical spine surgery INDICATION: Cervical myelopathy   Procedure guidance  COMPARISON:  None TECHNIQUE: FLUOROSCOPY TIME:   1 minute 48 seconds 9 FLUOROSCOPIC IMAGES FINDINGS: Fluoroscopic guidance provided for procedure guidance  Level verification was called to the OR at the time of image acquisition, as per Department protocol  Radiodense probe noted anteriorly at the C5-C6 intervertebral disc space  Subsequent images demonstrate both anterior and posterior fusion hardware  Osseous and soft tissue detail limited by technique  Impression: Fluoroscopic guidance provided for procedure guidance  Please refer to the separate procedure notes for additional details    Workstation performed: [id number]     XR cervical spine 2 or 3 views    Result Date: 3/12/2022  Narrative: CERVICAL SPINE INDICATION:   s/p cervical fusion  COMPARISON:  Fluoroscopy images from previous  VIEWS:  XR SPINE CERVICAL 2 OR 3 VW INJURY Images: 3 FINDINGS: The patient is status post anterior cervical fusion /discectomy from C5 through C7  Posterior laminectomy and fusion hardware from C3 to T1  Alignment appears anatomic  3 mm anterolisthesis C4 upon C5  Skin staples and surgical drain  Impression: ACDF C5-C7 and posterior fusion hardware from C3 through T1 as above  Workstation performed: [id number]     MRI brain wo contrast    Result Date: 3/5/2022  Narrative: MRI BRAIN WITHOUT CONTRAST INDICATION: Rule out CVA  COMPARISON: CTA performed yesterday, MRI dated 3/27/2021  TECHNIQUE:  Sagittal T1, axial T2, axial FLAIR, axial T1, axial [place] and axial diffusion imaging  IMAGE QUALITY:  Diagnostic  FINDINGS: BRAIN PARENCHYMA:  There is no discrete mass, mass effect or midline shift  There is no intracranial hemorrhage  There is no evidence of acute infarction and diffusion imaging is unremarkable  Small scattered hyperintensities on T2/FLAIR imaging are noted in the periventricular and subcortical white matter demonstrating an appearance that is statistically most likely to represent mild microangiopathic change  VENTRICLES:  Mild volume loss  No hydrocephalus  SELLA AND PITUITARY GLAND:  Normal  ORBITS:  Normal  PARANASAL SINUSES: Minimal ethmoid mucosal thickening  Minimal mastoid opacification is improved compared with the previous MRI  VASCULATURE:  Evaluation of the major intracranial vasculature demonstrates appropriate flow voids  CALVARIUM AND SKULL BASE:  Normal  EXTRACRANIAL SOFT TISSUES:  Normal      Impression: No acute intracranial pathology  Mild chronic microangiopathy   Workstation performed: [id number]     MRI cervical spine w wo contrast    Result Date: 3/5/2022  Narrative: MRI CERVICAL SPINE WITH AND WITHOUT CONTRAST INDICATION: CT findings, weakness  COMPARISON:  CT dated 3/4/2022 and MRI dated 3/15/2013  TECHNIQUE:  Sagittal T1, sagittal T2, sagittal inversion recovery, axial 2D merge and axial T2  Sagittal T1 and axial T1 postcontrast   IV Contrast:  11 mL of Gadobutrol injection (SINGLE-DOSE) IMAGE QUALITY:  Motion artifact, worse on axial imaging  FINDINGS: ALIGNMENT:  Degenerative grade 1 anterolisthesis at C5-C6 is unchanged  Minimal degenerative anterolisthesis at C4-5 is less apparent  Alignment is otherwise normal  MARROW SIGNAL: Multilevel endplate degenerative changes most pronounced at C5-6 with mixed Modic type I and type II changes  No suspicious marrow signal abnormality  CERVICAL AND VISUALIZED UPPER THORACIC CORD:  Mild increased signal within the cord at C5-6 is suggested on sagittal images that may represent myelomalacia rather than edema    This cannot be confirmed on motion limited axial images  Cord is otherwise normal in signal intensity  PREVERTEBRAL AND PARASPINAL SOFT TISSUES:  Normal  VISUALIZED POSTERIOR FOSSA:  The visualized posterior fossa demonstrates no abnormal signal  CERVICAL DISC SPACES:  Severe space narrowing at C5-6 and C6-7 is stable compared with CT but progressed from previous MRI  C2-C3:  Disc complex with facet and uncovertebral hypertrophy  Mild canal stenosis  Bilateral foraminal stenosis, mild on the right and moderate on the left  C3-C4:  Disc osteophyte complex with facet, uncovertebral and ligamentum flavum hypertrophy  Moderate canal stenosis  Slight flattening of the cord  Severe bilateral foraminal stenosis  C4-C5:  Small disc osteophyte complex, facet and uncovertebral hypertrophy  Mild to moderate canal stenosis  Bilateral foraminal stenosis  C5-C6: Limited evaluation on axial imaging  Discogenic complex with facet, uncovertebral and ligamentum flavum hypertrophy  Severe canal stenosis with flattening the cord has progressed since 2013   Bilateral foraminal stenosis  C6-C7:  Disc osteophyte complex with facet and uncovertebral hypertrophy  Mild canal stenosis  Bilateral foraminal stenosis right worse than left  C7-T1:  No disc herniation or canal stenosis  Facet arthropathy with at least mild foraminal stenosis  UPPER THORACIC DISC SPACES:  Small disc osteophyte complexes that mildly indent the thecal sac without significant canal stenosis  Facet arthropathy with foraminal stenosis most pronounced at T1-T2  POSTCONTRAST IMAGING:  Normal      Impression: No acute abnormality  Motion limited    Multilevel degenerative spondylosis progressed from 2013 most severe at C5-6 with severe canal stenosis and flattening of the cord  Small focus of T2 signal within the cord at C5-C6 may represent myelomalacia  Moderate canal stenosis at C3-4 mildly indents the cord without signal abnormality  Marked multilevel foraminal stenosis  No prevertebral edema or mass  Workstation performed: [id number]     CT recon only cervical spine (No Charge)    Result Date: 3/4/2022  Narrative: CT CERVICAL SPINE - WITHOUT CONTRAST INDICATION:   Neck pain, acute, no red flags na RUE numbness/tingling, neck pain  COMPARISON:  None  TECHNIQUE:  CT examination of the cervical spine was performed without intravenous contrast   Contiguous axial images were obtained  Sagittal and coronal reconstructions were performed  Radiation dose length product (DLP) for this visit:  0 mGy-cm   This examination, like all CT scans performed in the [facility], was performed utilizing techniques to minimize radiation dose exposure, including the use of iterative reconstruction and automated exposure control  IMAGE QUALITY:  Diagnostic  FINDINGS: ALIGNMENT:  Grade 1 anterolisthesis of C4 on C5 possibly from degenerative facet arthrosis VERTEBRAL BODIES:  No fracture  Suspect asymmetric soft tissue in the right paraspinal region  Correlate with contrast-enhanced cervical spine MRI   DEGENERATIVE CHANGES:  Multilevel facet severe arthritic changes causing multilevel neural foraminal narrowing at nearly all levels  Disc osteophyte complex and posterior endplate osteophytes causing spinal canal stenosis at C5-C6 and C6-C7  Complete loss of the disc space at C5-C6 and C6-C7 where there is endplate erosive changes most likely degenerative in the absence of suspected infection  THORACIC INLET:  Normal      Impression: Multilevel degenerative changes causing multilevel neural foraminal and spinal canal stenosis as described  Suspect asymmetric soft tissue in the right paraspinal region  Correlate with contrast-enhanced cervical spine MRI recommended to exclude paraspinal soft tissue infection  Workstation performed: [id number]       I have personally reviewed pertinent reports     and I have personally reviewed pertinent films in PACS

## 2022-03-31 NOTE — ASSESSMENT & PLAN NOTE
POD 20 Anterior cervical discectomy and fixation fusion C5/6 and C6/7; Posterior cervical decompression and instrumented fusion C3-T1 with Dr Flynn Rangel on 3/11/22    Imaging:    CT Cervical w/ contrast ordered in ED- waiting for final read    Plan:   Continue to monitor neurological exam and symptoms   Holding antibiotics for now- plan to culture site   [facility] management and pain control per primary team   Ordered- CBC w/ diff, BMP, ESR/CRP, blood cultures x2, aPTT,   · DVT PPX: SCDs   · Monitor for any new neuro complaints   [place] collar to remain in place at all times, okay [facility] for showering  o Patient history of factor five Leiden along with DVT/PE- holding AC for now  Plan to resume at later time  · ID consulted    Neurosurgery will follow as primary, call with any further questions or concerns

## 2022-03-31 NOTE — ASSESSMENT & PLAN NOTE
· As addressed in HPI  · ACDF incision with overlying steri strips , lifting at edges , did not remove , appears to be dried scabbing in some areas  No signs of infection   · Refer to photo attachment   · Wearing Aspen cervical collar   · Admits has replacement liners that wife regularly cleanses            PLAN  · Do not remove steri strips , allow to fall off  Modesta Escobedo

## 2022-03-31 NOTE — ASSESSMENT & PLAN NOTE
Patient history of factor five Leiden along with DVT/PE along with Afib       Last dose coumadin 3/31  INR ordered and pending  Hold AC/AP for likely need for surgery

## 2022-03-31 NOTE — TELEPHONE ENCOUNTER
Received the pictures below when this RN was out of the office at 12:19 am  Patient's wife sent the pictures below to this RN's work email

## 2022-03-31 NOTE — TELEPHONE ENCOUNTER
Patient's wife also sent these pictures this morning at 7:30 am  Routing pictures to Mack Henry and Elizabeth Silver since the patient is scheduled for an appointment today with Mack Henry

## 2022-03-31 NOTE — PROGRESS NOTES
03/31/22 1318   Hello, [Guardians Name / Viki Ricardo, this is [Caller Perry Rather from [facility], and our clinical care team wanted to check on you / your child after your recent visit to the hospital  It will only take 3-5 minutes  Is this a good time? Discharge Call Type/ Specific Diagnosis: General Call   General Discharge Phone Call   Were your/your child's discharge instructions clear and understandable? Please tell me in your own words how to care for yourself/your child now that you're home Yes;Patient understood instructions   Have you filled your/your child's new prescriptions yet? Yes   What questions do you have about those medications? No questions   Are you/your child having any unusual symptoms or problems? (Specific to problem- i e , dressing, pain, bruising or swelling, procedure, etc ) No reported symptoms/problems   Do you have follow up appointment with your/your child's physician? Yes   Is there anything preventing you from keeping that appointment? No;Patient able to keep appointment   Are there any physicians, nurses, or hospital staff you would like us to recognize for doing a very good job? Nurse;PCA/Tech;PT/OT/RT/SLP  (Everyone did a great job!)   Thank you for taking the time to share with me about your care and recovery  Do you have any suggestions for us? No   This call resulted in: No interventions needed   Call Complete   Attempted Number of Calls 1   Discharge phone call complete? Complete   Hi, This is ________ from [facility]  This is just a courtesy call, and there is no need to call us back  Have a great day     (Called by [address])

## 2022-03-31 NOTE — H&P
[facility]  H&P- Gina Hooper 1950, 71 y o  male MRN: [id number]  Unit/Bed#: [facility] 126-40 Encounter: [id number]  Primary Care Provider: Jeffrey Nguyen MD   Date and time admitted to hospital: 3/31/2022 10:58 AM    Cervical myelopathy [facility])  Assessment & Plan  Patient directed to ED from office with concern for wound infection  · POD 20 Anterior cervical discectomy and fixation fusion C5/6 and C6/7; Posterior cervical decompression and instrumented fusion C3-T1 with Dr Jose Luis Joshua on 3/11/22    Imaging:    CT Cervical w/ contrast ordered in ED- waiting for final read    Plan:   Continue to monitor neurological exam and symptoms   Holding antibiotics for now as patient systemic well with anticipated need for cultures   Labs ordered- CBC w/ diff, BMP, ESR/CRP, blood cultures x2, aPTT, INR  · DVT PPX: SCDs, def pharm dvt ppx as recently on coumadin with last dose 3/31    [place] collar to remain in place at all times, okay for [place] collar for showering  · SLIM and ID consulted  · Will admit with pain medication and work-up as above  · Given overall incisional appearance, purulent drainage and no improvement with 6 days of oral Keflex, discussed anticipated need for surgical washout  · Discussed proceed along with obtaining intraoperative cultures  · Will await labs and CT to determine final plan of care  · Will make NPO after midnight in the event imaging/labs show surgery to be completed tomorrow  Neurosurgery will follow as primary, call with any further questions or concerns  At risk for venous thromboembolism (VTE)  Assessment & Plan  Patient history of factor five Leiden along with DVT/PE along with Afib  Last dose coumadin 3/31  INR ordered and pending  Hold AC/AP for likely need for surgery      Factor V Leiden mutation [facility])  Assessment & Plan  AC on hold for likely surgery    History of Present Illness     HPI: Gina oHoper is a 71 y o  male with  PMH significant for Factor V Leiden, history of DVT/PE, paroxysmal atrial fibrillation, sleep apnea on CPAP who presents to the ED aftre office visit with complaint of increased redness and drainage to his incision site  He original presented to the hospital 3/4/22 with symptoms and exam c/w cervical myelopathy  Cervical spine imaging demonstrated severe stenosis and cord compression with signal abnormality  Patient is now status post an anterior cervical discectomy and fixation fusion C5/6 and C6/7 along with posterior cervical decompression and instrumented fusion C3-T1 with Dr Vladimir Campuzano on 3/11/22  Postoperatively, he slowly restarted on heparin bridge to coumadin  He developed posterior incisional bruising and bloody drainage which has since become purulent and mucoid  He was recently discharged from rehab and was seen by Neurosurgical CRNP today who recommended that the patient come to the ED due to the appearance of his incision  Reports drainage seeping through guaze with mild left neck pain  Single episode of urinary incontinence last evening  Continues with improved strength, sensation and gait  He denies known fevers/chills  Denies numbness, paresthesias, or weakness to BUE and BLE  Remainder ROS negative  Review of Systems   Constitutional: Negative for activity change, chills, fatigue and fever  HENT: Negative for hearing loss, trouble swallowing and voice change  Eyes: Negative for photophobia and visual disturbance  Respiratory: Negative for cough and shortness of breath  Cardiovascular: Negative for chest pain and leg swelling  Gastrointestinal: Negative for abdominal pain, nausea and vomiting  Genitourinary: Negative for decreased urine volume and difficulty urinating  One episode of incontinence last evening   Musculoskeletal: Positive for neck pain (left sided)  Negative for back pain and gait problem           Positive for left shoulder tightness/cramping Skin: Positive for rash ( Rash to LLE that is red with grey plaques on top)  Negative for pallor and wound  Neurological: Negative for dizziness, tremors, seizures, speech difficulty, weakness (improving), light-headedness, numbness and headaches  Psychiatric/Behavioral: Negative for agitation and confusion  Historical Information   Past Medical History:   Diagnosis Date    Acute venous embolism and thrombosis of deep vessels of proximal lower extremity (HCC)     Last assessed: 5/18/15    Arthritis     Cellulitis     LE    CPAP (continuous positive airway pressure) dependence     Factor V Leiden ([address] )     Forgetfulness     [place] (hard of hearing)     Paroxysmal atrial fibrillation (HCC)     Last assessed: 11/2/15    Sleep apnea      Past Surgical History:   Procedure Laterality Date    BACK SURGERY      Lower    COLON SURGERY      COLONOSCOPY      OR ARTHRODESIS ANT INTERBODY MIN DISCECTOMY, CERVICAL BELOW C2 N/A 3/11/2022    Procedure: Anterior cervical discectomy and fixation fusion C5/6 and C6/7; Posterior cervical decompression and instrumented fusion C3-T1;   Surgeon: Brooke Hargrove MD;  Location: [facility];  Service: Neurosurgery     Social History     Substance and Sexual Activity   Alcohol Use Not Currently     Social History     Substance and Sexual Activity   Drug Use No     Social History     Tobacco Use   Smoking Status Never Smoker   Smokeless Tobacco Never Used     Family History   Problem Relation Age of Onset    Lung cancer Mother     No Known Problems Father     Heart attack Brother     Atrial fibrillation Brother     Emphysema Sister        Meds/Allergies   all current active meds have been reviewed, current meds:   Current Facility-Administered Medications   Medication Dose Route Frequency    acetaminophen (TYLENOL) tablet 975 mg  975 mg Oral TID PRN    amLODIPine (NORVASC) tablet 5 mg  5 mg Oral Daily    cefepime (MAXIPIME) 2,000 mg in dextrose 5 % 50 mL IVPB  2,000 mg Intravenous Q12H    docusate sodium (COLACE) capsule 100 mg  100 mg Oral BID    DULoxetine (CYMBALTA) delayed release capsule 60 mg  60 mg Oral Daily    flecainide (TAMBOCOR) tablet 100 mg  100 mg Oral BID    gabapentin (NEURONTIN) capsule 100 mg  100 mg Oral Daily    gabapentin (NEURONTIN) capsule 300 mg  300 mg Oral HS    metoprolol succinate (TOPROL-XL) 24 hr tablet 100 mg  100 mg Oral Daily    ondansetron (ZOFRAN) injection 4 mg  4 mg Intravenous Q6H PRN    oxyCODONE (ROXICODONE) IR tablet 2 5 mg  2 5 mg Oral Q4H PRN    oxyCODONE (ROXICODONE) IR tablet 5 mg  5 mg Oral Q4H PRN    potassium chloride (K-DUR,KLOR-CON) CR tablet 40 mEq  40 mEq Oral Daily    pravastatin (PRAVACHOL) tablet 40 mg  40 mg Oral Daily    senna (SENOKOT) tablet 8 6 mg  8 6 mg Oral Daily    sodium chloride 0 9 % infusion  125 mL/hr Intravenous Continuous    tamsulosin (FLOMAX) capsule 0 4 mg  0 4 mg Oral Daily With Dinner    vancomycin (VANCOCIN) 1,750 mg in sodium chloride 0 9 % 500 mL IVPB  20 mg/kg (Adjusted) Intravenous Q12H    and PTA meds:   Prior to Admission Medications   Prescriptions Last Dose Informant Patient Reported? Taking?    [facility] Vitamin C 500 MG tablet  Self No No   Sig: TAKE 1 TABLET BY MOUTH 2 TIMES A DAY   DULoxetine (CYMBALTA) 60 mg delayed release capsule  Self No No   Sig: TAKE 1 CAPSULE BY MOUTH  DAILY   acetaminophen (TYLENOL) 500 mg tablet   No No   Sig: Take 2 tablets (1,000 mg total) by mouth 3 (three) times a day as needed for mild pain   amLODIPine (NORVASC) 5 mg tablet   No No   Sig: Take 1 tablet (5 mg total) by mouth daily   cephalexin (KEFLEX) 500 mg capsule   No No   Sig: Take 1 capsule (500 mg total) by mouth every 6 (six) hours for 4 days   docusate sodium (COLACE) 100 mg capsule   No No   Sig: Take 1 capsule (100 mg total) by mouth 2 (two) times a day   flecainide (TAMBOCOR) 100 mg tablet  Self No No   Sig: TAKE 1 TABLET BY MOUTH  TWICE DAILY   gabapentin (NEURONTIN) 100 mg capsule   No No   Sig: Take 1 capsule (100 mg total) by mouth in the morning   gabapentin (NEURONTIN) 300 mg capsule   No No   Sig: Take 1 capsule (300 mg total) by mouth daily at bedtime   metoprolol succinate (TOPROL-XL) 100 mg 24 hr tablet   No No   Sig: TAKE 1 TABLET BY MOUTH  DAILY   oxyCODONE (ROXICODONE) 5 immediate release tablet   No No   Sig: Take 0 5 tab (2 5mg) to 1 tab (5mg) PO up to 3 times daily as needed for moderate to severe pain   potassium chloride (K-DUR,KLOR-CON) 20 mEq tablet   No No   Sig: Take 2 tablets (40 mEq total) by mouth daily   pravastatin (PRAVACHOL) 40 mg tablet   No No   Sig: TAKE 1 TABLET BY MOUTH  DAILY   senna (SENOKOT) 8 6 mg Not Taking at Unknown time  No No   Sig: Take 1 tablet (8 6 mg total) by mouth daily   Patient not taking: Reported on 3/31/2022    tamsulosin (FLOMAX) 0 4 mg  Self No No   Sig: TAKE 1 CAPSULE BY MOUTH  DAILY WITH DINNER   warfarin (COUMADIN) 5 mg tablet   No No   Sig: TAKE 0 5 TABLET BY MOUTH DAILY STARTING 3/30 AND THEN ADJUST DOSE BASED ON PT/INR GOAL OF 2 0-3 0  Facility-Administered Medications: None     Allergies   Allergen Reactions    Morphine GI Intolerance    Vitamin K Other (See Comments)     On coumadin-patient sensitive to vitamin K amounts in meds etc        Objective   I/O     None          Physical Exam  Constitutional:       General: He is not in acute distress  Appearance: Normal appearance  He is well-developed  He is not ill-appearing  HENT:      Head: Normocephalic and atraumatic  Right Ear: External ear normal       Left Ear: External ear normal       Nose: Nose normal       Mouth/Throat:      Mouth: Mucous membranes are dry  Eyes:      General: No scleral icterus  Right eye: No discharge  Left eye: No discharge  Extraocular Movements: Extraocular movements intact and EOM normal       Conjunctiva/sclera: Conjunctivae normal       Pupils: Pupils are equal, round, and reactive to light  Cardiovascular:      Rate and Rhythm: Normal rate  Pulmonary:      Effort: Pulmonary effort is normal  No respiratory distress  Abdominal:      General: There is no distension  Tenderness: There is no abdominal tenderness  Musculoskeletal:      Cervical back: Normal range of motion and neck supple  Tenderness present  Skin:     General: Skin is warm and dry  Findings: Erythema present  Comments: Inferior>superior incision red/warm/indurated  Active purulent drainage with applied pressure  Neurological:      Mental Status: He is alert  Deep Tendon Reflexes:      Reflex Scores:       Patellar reflexes are 2+ on the right side and 2+ on the left side  Psychiatric:         Mood and Affect: Mood normal          Speech: Speech normal          Behavior: Behavior normal          Thought Content: Thought content normal          Judgment: Judgment normal        Neurologic Exam     Mental Status   Follows 3 step commands  Attention: normal  Concentration: normal    Speech: speech is normal   Level of consciousness: alert  Knowledge: good  Able to perform simple calculations  Normal comprehension  Cranial Nerves     CN III, IV, VI   Pupils are equal, round, and reactive to light  Extraocular motions are normal    Nystagmus: none   Upgaze: normal  Conjugate gaze: present    CN V   Facial sensation intact  CN VII   Facial expression full, symmetric  CN VIII   Hearing: intact    CN XI   Right trapezius strength: normal  Left trapezius strength: normal    CN XII   Tongue: not atrophic  Fasciculations: absent  Tongue deviation: none    Motor Exam   Muscle bulk: normal  Overall muscle tone: normal    Strength   Strength 5/5 except as noted   RLE 4-4+     Sensory Exam   Light touch normal      Gait, Coordination, and Reflexes     Tremor   Resting tremor: absent  Intention tremor: absent  Action tremor: absent    Reflexes   Right patellar: 2+  Left patellar: 2+  Right Jiménez: absent  Left Jiménez: absent  Right ankle clonus: present  Left ankle clonus: present      Vitals:Blood pressure 133/75, pulse 66, temperature 98 2 °F (36 8 °C), resp  rate 18, height 5' 11" (1 803 m), weight 107 kg (235 lb), SpO2 95 %  ,Body mass index is 32 78 kg/m²  Lab Results:   Results from last 7 days   Lab Units 03/31/22  1618 03/29/22  0606 03/28/22  0501   WBC Thousand/uL 6 86 8 31 8 76   HEMOGLOBIN g/dL 10 4* 9 4* 9 5*   HEMATOCRIT % 32 0* 28 8* 29 8*   PLATELETS Thousands/uL 365 291 273   NEUTROS PCT % 70 82* 84*   MONOS PCT % 6 6 4     Results from last 7 days   Lab Units 03/31/22  1618 03/29/22  0606 03/28/22  0501   POTASSIUM mmol/L 4 0 3 2* 3 1*   CHLORIDE mmol/L 98* 99* 97*   CO2 mmol/L 31 31 31   BUN mg/dL 13 11 14   CREATININE mg/dL 0 64 0 53* 0 65   CALCIUM mg/dL 9 8 9 0 9 1     Results from last 7 days   Lab Units 03/29/22  0606   MAGNESIUM mg/dL 1 9         Results from last 7 days   Lab Units 03/31/22  1618 03/29/22  0606 03/28/22  0501   INR  2 37* 2 96* 3 15*   PTT seconds 50*  --   --      No results found for: TROPONINT  ABG:No results found for: PHART, ZLQ4QSJ, PO2ART, GOL1MRN, I2LMSYRH, BEART, SOURCE    Imaging Studies: pending    VTE Prophylaxis: Sequential compression device (Venodyne)     Code Status: Level 1 - Full Code  Advance Directive and Living Will: Yes    Power of :    POLST:      Counseling / Coordination of Care  I spent 30 minutes with the patient

## 2022-03-31 NOTE — PROGRESS NOTES
Vancomycin IV Pharmacy-to-Dose Consultation    Gilberto Jennings is a 71 y o  male who is currently receiving Vancomycin IV with management by the Pharmacy Consult service  Relevant clinical data and objective history reviewed:  Temp Readings from Last 3 Encounters:   03/31/22 98 7 °F (37 1 °C)   03/31/22 (!) 96 4 °F (35 8 °C) (Tympanic)   03/29/22 97 7 °F (36 5 °C) (Oral)     /88   Pulse 62   Temp 98 7 °F (37 1 °C)   Resp 19   Wt 107 kg (235 lb)   SpO2 96%   BMI 32 78 kg/m²     I/O last 3 completed shifts: In: 240 [P O :240]  Out: -     Lab Results   Component Value Date/Time    BUN 13 03/31/2022 04:18 PM    BUN 17 11/02/2021 01:02 PM    WBC 6 86 03/31/2022 04:18 PM    WBC 8 43 01/23/2015 04:40 AM    HGB 10 4 (L) 03/31/2022 04:18 PM    HGB 12 9 01/23/2015 04:40 AM    HCT 32 0 (L) 03/31/2022 04:18 PM    HCT 39 5 01/23/2015 04:40 AM    MCV 94 03/31/2022 04:18 PM    MCV 93 01/23/2015 04:40 AM     03/31/2022 04:18 PM     01/23/2015 04:40 AM     Creatinine   Date Value Ref Range Status   03/31/2022 0 64 0 60 - 1 30 mg/dL Final     Comment:     Standardized to IDMS reference method   03/29/2022 0 53 (L) 0 60 - 1 30 mg/dL Final     Comment:     Standardized to IDMS reference method   10/25/2017 1 24 0 76 - 1 27 mg/dL Final   11/03/2016 1 11 0 76 - 1 27 mg/dL Final         Vancomycin Assessment:  Indication: skin-soft tissue infection,other bone/joint  Renal Function: Scr 0 64 mg/dL; CrCl > 100 mL/min  Potential Nephrotoxicity Factors:  Medications: None  Patient-Factors: Advanced age  Days of Therapy: 1  Current Dose: 1750 mg q12h   Goal Trough: 15-20 (appropriate for most indications)   Goal AUC(24h): 400-600        Vancomycin Plan:  New Dosing: continue 1750 mg q12h   Next Level: 4/2 @ 0700 (prior to 4th dose)  Renal Function Monitoring: Daily BMP      Pharmacy will continue to follow closely for s/sx of nephrotoxicity, infusion reactions and appropriateness of therapy    BMP and CBC will be ordered per protocol  We will continue to follow the patient's culture results and clinical progress daily      Kirk Davila RP

## 2022-04-01 ENCOUNTER — ANESTHESIA EVENT (INPATIENT)
Dept: PERIOP | Facility: HOSPITAL | Age: 72
DRG: 501 | End: 2022-04-01
Payer: COMMERCIAL

## 2022-04-01 ENCOUNTER — ANESTHESIA (INPATIENT)
Dept: PERIOP | Facility: HOSPITAL | Age: 72
DRG: 501 | End: 2022-04-01
Payer: COMMERCIAL

## 2022-04-01 PROBLEM — T81.49XA SURGICAL SITE INFECTION: Status: ACTIVE | Noted: 2022-03-31

## 2022-04-01 LAB
APTT PPP: 50 SECONDS (ref 23–37)
GLUCOSE SERPL-MCNC: 126 MG/DL (ref 65–140)
INR PPP: 2.26 (ref 0.84–1.19)
PROTHROMBIN TIME: 23.8 SECONDS (ref 11.6–14.5)

## 2022-04-01 PROCEDURE — 85730 THROMBOPLASTIN TIME PARTIAL: CPT | Performed by: PHYSICIAN ASSISTANT

## 2022-04-01 PROCEDURE — 0W9600Z DRAINAGE OF NECK WITH DRAINAGE DEVICE, OPEN APPROACH: ICD-10-PCS | Performed by: NEUROLOGICAL SURGERY

## 2022-04-01 PROCEDURE — 87070 CULTURE OTHR SPECIMN AEROBIC: CPT | Performed by: NEUROLOGICAL SURGERY

## 2022-04-01 PROCEDURE — 87206 SMEAR FLUORESCENT/ACID STAI: CPT | Performed by: NEUROLOGICAL SURGERY

## 2022-04-01 PROCEDURE — 99222 1ST HOSP IP/OBS MODERATE 55: CPT | Performed by: PHYSICIAN ASSISTANT

## 2022-04-01 PROCEDURE — 87116 MYCOBACTERIA CULTURE: CPT | Performed by: NEUROLOGICAL SURGERY

## 2022-04-01 PROCEDURE — 99024 POSTOP FOLLOW-UP VISIT: CPT | Performed by: NURSE PRACTITIONER

## 2022-04-01 PROCEDURE — 85610 PROTHROMBIN TIME: CPT | Performed by: PHYSICIAN ASSISTANT

## 2022-04-01 PROCEDURE — 11042 DBRDMT SUBQ TIS 1ST 20SQCM/<: CPT | Performed by: NEUROLOGICAL SURGERY

## 2022-04-01 PROCEDURE — 87205 SMEAR GRAM STAIN: CPT | Performed by: NEUROLOGICAL SURGERY

## 2022-04-01 PROCEDURE — 0PD30ZZ EXTRACTION OF CERVICAL VERTEBRA, OPEN APPROACH: ICD-10-PCS | Performed by: NEUROLOGICAL SURGERY

## 2022-04-01 PROCEDURE — 87186 SC STD MICRODIL/AGAR DIL: CPT | Performed by: NEUROLOGICAL SURGERY

## 2022-04-01 PROCEDURE — 82948 REAGENT STRIP/BLOOD GLUCOSE: CPT

## 2022-04-01 PROCEDURE — 87075 CULTR BACTERIA EXCEPT BLOOD: CPT | Performed by: NEUROLOGICAL SURGERY

## 2022-04-01 RX ORDER — ONDANSETRON 2 MG/ML
INJECTION INTRAMUSCULAR; INTRAVENOUS AS NEEDED
Status: DISCONTINUED | OUTPATIENT
Start: 2022-04-01 | End: 2022-04-01

## 2022-04-01 RX ORDER — ONDANSETRON 2 MG/ML
4 INJECTION INTRAMUSCULAR; INTRAVENOUS ONCE AS NEEDED
Status: DISCONTINUED | OUTPATIENT
Start: 2022-04-01 | End: 2022-04-01 | Stop reason: HOSPADM

## 2022-04-01 RX ORDER — SODIUM CHLORIDE 9 MG/ML
75 INJECTION, SOLUTION INTRAVENOUS CONTINUOUS
Status: DISCONTINUED | OUTPATIENT
Start: 2022-04-01 | End: 2022-04-04

## 2022-04-01 RX ORDER — SODIUM CHLORIDE, SODIUM LACTATE, POTASSIUM CHLORIDE, CALCIUM CHLORIDE 600; 310; 30; 20 MG/100ML; MG/100ML; MG/100ML; MG/100ML
INJECTION, SOLUTION INTRAVENOUS CONTINUOUS PRN
Status: DISCONTINUED | OUTPATIENT
Start: 2022-04-01 | End: 2022-04-01

## 2022-04-01 RX ORDER — FENTANYL CITRATE 50 UG/ML
INJECTION, SOLUTION INTRAMUSCULAR; INTRAVENOUS AS NEEDED
Status: DISCONTINUED | OUTPATIENT
Start: 2022-04-01 | End: 2022-04-01

## 2022-04-01 RX ORDER — LIDOCAINE HYDROCHLORIDE 20 MG/ML
INJECTION, SOLUTION EPIDURAL; INFILTRATION; INTRACAUDAL; PERINEURAL AS NEEDED
Status: DISCONTINUED | OUTPATIENT
Start: 2022-04-01 | End: 2022-04-01

## 2022-04-01 RX ORDER — HYDROMORPHONE HCL IN WATER/PF 6 MG/30 ML
0.2 PATIENT CONTROLLED ANALGESIA SYRINGE INTRAVENOUS
Status: DISCONTINUED | OUTPATIENT
Start: 2022-04-01 | End: 2022-04-01 | Stop reason: HOSPADM

## 2022-04-01 RX ORDER — CHLORHEXIDINE GLUCONATE 0.12 MG/ML
15 RINSE ORAL ONCE
Status: COMPLETED | OUTPATIENT
Start: 2022-04-01 | End: 2022-04-01

## 2022-04-01 RX ORDER — ALBUMIN, HUMAN INJ 5% 5 %
SOLUTION INTRAVENOUS CONTINUOUS PRN
Status: DISCONTINUED | OUTPATIENT
Start: 2022-04-01 | End: 2022-04-01

## 2022-04-01 RX ORDER — ROCURONIUM BROMIDE 10 MG/ML
INJECTION, SOLUTION INTRAVENOUS AS NEEDED
Status: DISCONTINUED | OUTPATIENT
Start: 2022-04-01 | End: 2022-04-01

## 2022-04-01 RX ORDER — FENTANYL CITRATE/PF 50 MCG/ML
25 SYRINGE (ML) INJECTION
Status: DISCONTINUED | OUTPATIENT
Start: 2022-04-01 | End: 2022-04-01 | Stop reason: HOSPADM

## 2022-04-01 RX ORDER — EPHEDRINE SULFATE 50 MG/ML
INJECTION INTRAVENOUS AS NEEDED
Status: DISCONTINUED | OUTPATIENT
Start: 2022-04-01 | End: 2022-04-01

## 2022-04-01 RX ORDER — CEFAZOLIN SODIUM 2 G/50ML
2000 SOLUTION INTRAVENOUS EVERY 8 HOURS
Status: DISCONTINUED | OUTPATIENT
Start: 2022-04-01 | End: 2022-04-02

## 2022-04-01 RX ORDER — PROPOFOL 10 MG/ML
INJECTION, EMULSION INTRAVENOUS AS NEEDED
Status: DISCONTINUED | OUTPATIENT
Start: 2022-04-01 | End: 2022-04-01

## 2022-04-01 RX ORDER — DEXAMETHASONE SODIUM PHOSPHATE 10 MG/ML
INJECTION, SOLUTION INTRAMUSCULAR; INTRAVENOUS AS NEEDED
Status: DISCONTINUED | OUTPATIENT
Start: 2022-04-01 | End: 2022-04-01

## 2022-04-01 RX ORDER — SUCCINYLCHOLINE/SOD CL,ISO/PF 100 MG/5ML
SYRINGE (ML) INTRAVENOUS AS NEEDED
Status: DISCONTINUED | OUTPATIENT
Start: 2022-04-01 | End: 2022-04-01

## 2022-04-01 RX ADMIN — DULOXETINE HYDROCHLORIDE 60 MG: 60 CAPSULE, DELAYED RELEASE ORAL at 08:38

## 2022-04-01 RX ADMIN — SODIUM CHLORIDE, SODIUM LACTATE, POTASSIUM CHLORIDE, AND CALCIUM CHLORIDE: .6; .31; .03; .02 INJECTION, SOLUTION INTRAVENOUS at 14:44

## 2022-04-01 RX ADMIN — EPHEDRINE SULFATE 5 MG: 50 INJECTION INTRAVENOUS at 15:10

## 2022-04-01 RX ADMIN — VANCOMYCIN HYDROCHLORIDE 1750 MG: 5 INJECTION, POWDER, LYOPHILIZED, FOR SOLUTION INTRAVENOUS at 08:33

## 2022-04-01 RX ADMIN — PROPOFOL 150 MG: 10 INJECTION, EMULSION INTRAVENOUS at 14:50

## 2022-04-01 RX ADMIN — SODIUM CHLORIDE, SODIUM LACTATE, POTASSIUM CHLORIDE, AND CALCIUM CHLORIDE: .6; .31; .03; .02 INJECTION, SOLUTION INTRAVENOUS at 16:47

## 2022-04-01 RX ADMIN — NOREPINEPHRINE BITARTRATE 3 MCG/MIN: 1 INJECTION, SOLUTION, CONCENTRATE INTRAVENOUS at 15:53

## 2022-04-01 RX ADMIN — EPHEDRINE SULFATE 10 MG: 50 INJECTION INTRAVENOUS at 15:36

## 2022-04-01 RX ADMIN — Medication 2500 UNITS: at 15:01

## 2022-04-01 RX ADMIN — CEFAZOLIN SODIUM 2000 MG: 2 SOLUTION INTRAVENOUS at 21:32

## 2022-04-01 RX ADMIN — FENTANYL CITRATE 100 MCG: 50 INJECTION INTRAMUSCULAR; INTRAVENOUS at 14:50

## 2022-04-01 RX ADMIN — ALBUMIN (HUMAN): 12.5 INJECTION, SOLUTION INTRAVENOUS at 16:02

## 2022-04-01 RX ADMIN — GABAPENTIN 100 MG: 100 CAPSULE ORAL at 08:38

## 2022-04-01 RX ADMIN — AMLODIPINE BESYLATE 5 MG: 5 TABLET ORAL at 08:42

## 2022-04-01 RX ADMIN — POTASSIUM CHLORIDE 40 MEQ: 20 TABLET, EXTENDED RELEASE ORAL at 08:38

## 2022-04-01 RX ADMIN — ROCURONIUM BROMIDE 10 MG: 50 INJECTION INTRAVENOUS at 16:14

## 2022-04-01 RX ADMIN — CHLORHEXIDINE GLUCONATE 15 ML: 1.2 SOLUTION ORAL at 12:29

## 2022-04-01 RX ADMIN — GABAPENTIN 300 MG: 300 CAPSULE ORAL at 21:32

## 2022-04-01 RX ADMIN — ALBUMIN (HUMAN): 12.5 INJECTION, SOLUTION INTRAVENOUS at 15:27

## 2022-04-01 RX ADMIN — ROCURONIUM BROMIDE 10 MG: 50 INJECTION INTRAVENOUS at 15:45

## 2022-04-01 RX ADMIN — EPHEDRINE SULFATE 5 MG: 50 INJECTION INTRAVENOUS at 15:07

## 2022-04-01 RX ADMIN — DEXAMETHASONE SODIUM PHOSPHATE 10 MG: 10 INJECTION, SOLUTION INTRAMUSCULAR; INTRAVENOUS at 14:57

## 2022-04-01 RX ADMIN — Medication 140 MG: at 14:50

## 2022-04-01 RX ADMIN — SODIUM CHLORIDE 125 ML/HR: 0.9 INJECTION, SOLUTION INTRAVENOUS at 03:31

## 2022-04-01 RX ADMIN — ROCURONIUM BROMIDE 40 MG: 50 INJECTION INTRAVENOUS at 14:57

## 2022-04-01 RX ADMIN — PHENYLEPHRINE HYDROCHLORIDE 50 MCG/MIN: 10 INJECTION INTRAVENOUS at 15:39

## 2022-04-01 RX ADMIN — ONDANSETRON 4 MG: 2 INJECTION INTRAMUSCULAR; INTRAVENOUS at 16:50

## 2022-04-01 RX ADMIN — OXYCODONE HYDROCHLORIDE 5 MG: 5 TABLET ORAL at 04:22

## 2022-04-01 RX ADMIN — CEFEPIME HYDROCHLORIDE 2000 MG: 2 INJECTION, POWDER, FOR SOLUTION INTRAVENOUS at 07:31

## 2022-04-01 RX ADMIN — NEOMYCIN AND POLYMYXIN B SULFATES: 40; 200000 SOLUTION IRRIGATION at 12:49

## 2022-04-01 RX ADMIN — LIDOCAINE HYDROCHLORIDE 100 MG: 20 INJECTION, SOLUTION EPIDURAL; INFILTRATION; INTRACAUDAL; PERINEURAL at 14:50

## 2022-04-01 RX ADMIN — METOPROLOL SUCCINATE 100 MG: 100 TABLET, EXTENDED RELEASE ORAL at 08:42

## 2022-04-01 RX ADMIN — SUGAMMADEX 200 MG: 100 INJECTION, SOLUTION INTRAVENOUS at 16:50

## 2022-04-01 RX ADMIN — FLECAINIDE ACETATE 100 MG: 100 TABLET ORAL at 08:40
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
## 2022-04-01 NOTE — OP NOTE
OPERATIVE REPORT  PATIENT NAME: Paulette Olmedo    :  1950  MRN: [id number]  Pt Location: [facility] OR ROOM 09    SURGERY DATE: 2022    Surgeon(s) and Role:     * Vincent Graec MD - Primary    Preop Diagnosis:  Status post cervical spinal fusion [Z98 1]  Surgical site infection [T81 49XA]    Post-Op Diagnosis Codes:     * Status post cervical spinal fusion [Z98 1]     * Surgical site infection [T81 49XA]    Procedure(s) (LRB):  Posterior cervical evacuation of postoperative collection and debridement with placement of drains C3-T1 (N/A)    Specimen(s):  ID Type Source Tests Collected by Time Destination   1 : Posterior Cervical Wound  Tissue Back ANAEROBIC CULTURE AND GRAM STAIN, CULTURE, TISSUE AND GRAM STAIN, TISSUE EXAM, AFB CULTURE WITH STAIN Vincent Grace MD 2022 1525        Estimated Blood Loss:   Minimal    Drains:  Closed/Suction Drain Posterior Neck Bulb 7 Fr  (Active)   Number of days: 0       Closed/Suction Drain Posterior;Right Neck Bulb 7 Fr  (Active)   Number of days: 0       Anesthesia Type:   General    Operative Indications:  Status post cervical spinal fusion [Z98 1]  Surgical site infection [T81 49XA]      Operative Findings:   exudate    Complications:   None    Procedure and Technique:  After adequate general endotracheal anesthesia the patient was placed prone on the operating table  The staples were removed in the neck was prepped with Betadine scrub  This was then followed with Betadine soap and then DuraPrep  Double layer drapes were placed in normal fashion and a 3M Betadine impregnated sticky drape was placed over these  A time-out was called and all parameters a time-out were followed  The incision was widely opened  Cerebellar style we later retractors were used to maintain operative exposure  Multiloculated liquified seroma with regions of purulence was identified  The tissues were debrided  There was fat necrosis and some regions    The graft was removed but the instrumentation was kept in place  A total 4 L of normal saline with vancomycin was utilized to Pulsavac out the region for local debridement  At the conclusion of this 2 7 mm flat Kris-Tran drains were placed  The muscle was loosely approximated with interrupted inverted 0 Vicryl suture  The fascia was closed with interrupted 0 Vicryl suture  Subcutaneous tissues were closed with interrupted inverted to 0 Vicryl suture  2-0 Prolene sutures were placed over bolsters  The epidermis was approximated with staples  Clean sterile dressings were placed  The collar was replaced     I was present for the entire procedure    Patient Disposition:  PACU       SIGNATURE: Jeremy Clifford MD  DATE: April 1, 2022  TIME: 5:06 PM

## 2022-04-01 NOTE — ASSESSMENT & PLAN NOTE
Patient history of factor five Leiden along with DVT/PE along with Afib       Last dose coumadin 3/31  INR 2 37 on 3/31  Hold AC/AP for likely need for surgery

## 2022-04-01 NOTE — PLAN OF CARE
Problem: PAIN - ADULT  Goal: Verbalizes/displays adequate comfort level or baseline comfort level  Description: Interventions:  - Encourage patient to monitor pain and request assistance  - Assess pain using appropriate pain scale  - Administer analgesics based on type and severity of pain and evaluate response  - Implement non-pharmacological measures as appropriate and evaluate response  - Consider cultural and social influences on pain and pain management  - Notify physician/advanced practitioner if interventions unsuccessful or patient reports new pain  Outcome: Progressing     Problem: INFECTION - ADULT  Goal: Absence or prevention of progression during hospitalization  Description: INTERVENTIONS:  - Assess and monitor for signs and symptoms of infection  - Monitor lab/diagnostic results  - Monitor all insertion sites, i e  indwelling lines, tubes, and drains  - Monitor endotracheal if appropriate and nasal secretions for changes in amount and color  - [place] appropriate cooling/warming therapies per order  - Administer medications as ordered  - Instruct and encourage patient and family to use good hand hygiene technique  - Identify and instruct in appropriate isolation precautions for identified infection/condition  Outcome: Progressing  Goal: Absence of fever/infection during neutropenic period  Description: INTERVENTIONS:  - Monitor WBC    Outcome: Progressing     Problem: SAFETY ADULT  Goal: Patient will remain free of falls  Description: INTERVENTIONS:  - Educate patient/family on patient safety including physical limitations  - Instruct patient to call for assistance with activity   - Consult OT/PT to assist with strengthening/mobility   - Keep Call bell within reach  - Keep bed low and locked with side rails adjusted as appropriate  - Keep care items and personal belongings within reach  - Initiate and maintain comfort rounds  - Make Fall Risk Sign visible to staff  - Offer Toileting   - Obtain necessary fall risk management equipment  - Apply yellow socks and bracelet for high fall risk patients  - Consider moving patient to room near nurses station  Outcome: Progressing  Goal: Maintain or return to baseline ADL function  Description: INTERVENTIONS:  -  Assess patient's ability to carry out ADLs; assess patient's baseline for ADL function and identify physical deficits which impact ability to perform ADLs (bathing, care of mouth/teeth, toileting, grooming, dressing, etc )  - Assess/evaluate cause of self-care deficits   - Assess range of motion  - Assess patient's mobility; develop plan if impaired  - Assess patient's need for assistive devices and provide as appropriate  - Encourage maximum independence but intervene and supervise when necessary  - Involve family in performance of ADLs  - Assess for home care needs following discharge   - Consider OT consult to assist with ADL evaluation and planning for discharge  - Provide patient education as appropriate  Outcome: Progressing  Goal: Maintains/Returns to pre admission functional level  Description: INTERVENTIONS:  - Perform BMAT or MOVE assessment daily    - Set and communicate daily mobility goal to care team and patient/family/caregiver     - Collaborate with rehabilitation services on mobility goals if consulted  - Out of bed for toileting  - Record patient progress and toleration of activity level   Outcome: Progressing     Problem: DISCHARGE PLANNING  Goal: Discharge to home or other facility with appropriate resources  Description: INTERVENTIONS:  - Identify barriers to discharge w/patient and caregiver  - Arrange for needed discharge resources and transportation as appropriate  - Identify discharge learning needs (meds, wound care, etc )  - Arrange for interpretive services to assist at discharge as needed  - Refer to Case Management Department for coordinating discharge planning if the patient needs post-hospital services based on physician/advanced practitioner order or complex needs related to functional status, cognitive ability, or social support system  Outcome: Progressing     Problem: Knowledge Deficit  Goal: Patient/family/caregiver demonstrates understanding of disease process, treatment plan, medications, and discharge instructions  Description: Complete learning assessment and assess knowledge base    Interventions:  - Provide teaching at level of understanding  - Provide teaching via preferred learning methods  Outcome: Progressing
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
## 2022-04-01 NOTE — PROGRESS NOTES
Vancomycin IV Pharmacy-to-Dose Consultation    Jef Mitchell is a 71 y o  male who is currently receiving Vancomycin IV with management by the Pharmacy Consult service for the treatment of skin-soft tissue infection,other bone/joint  Assessment/Plan:    The patient's chart was reviewed  Renal function is stable  There are no signs or symptoms of nephrotoxicity and/or infusion reactions documented  The following nephrotoxicity factors are present:  Medications: none  Patient-Factors: elderly    Based on today's assessment, will continue current vancomycin (Day # 2) dosing of 1750 mg q12h, with a plan for trough to be drawn at 0700 on 4/2  We will continue to follow the patient's culture results and clinical progress daily        Martha Chavira, Pharmacist

## 2022-04-01 NOTE — ASSESSMENT & PLAN NOTE
Patient directed to ED from office with concern for wound infection  · POD 21 Anterior cervical discectomy and fixation fusion C5/6 and C6/7; Posterior cervical decompression and instrumented fusion C3-T1 with Dr May Narvaez on 3/11/22    Imaging:   · CT Cervical w/ contrast 3/31/2022: Large posterior soft tissue fluid collection, morphology suspicious for infection as clinically suspected  Study is nondiagnostic for evaluation of the central canal and epidural abscess  MR is better suited intracanalicular evaluation although will be limited by artifact from metal hardware  Plan:   Continue to monitor neurological exam and symptoms   Blood Cx results pending  · DVT PPX: SCDs, def pharm dvt ppx as recently on coumadin with last dose 3/31    [place] collar to remain in place at all times, okay for [place] collar for showering  · SLIM and ID consulted  · Given overall incisional appearance, purulent drainage and no improvement with 6 days of oral Keflex, discussed anticipated need for surgical washout  · Wound culture obtained and started on cefepime and vancomycin per ID  · Mobilize with PT/TO  Plan of care discussed with MIREILLE Kent  Neurosurgery will follow as primary, call with any further questions or concerns

## 2022-04-01 NOTE — UTILIZATION REVIEW
Initial Clinical Review    Admission: Date/Time/Statement:   Admission Orders (From admission, onward)     Ordered        03/31/22 1435  Inpatient Admission  Once                      Orders Placed This Encounter   Procedures    Inpatient Admission     Standing Status:   Standing     Number of Occurrences:   1     Order Specific Question:   Level of Care     Answer:   Med Surg [16]     Order Specific Question:   Estimated length of stay     Answer:   More than 2 Midnights     Order Specific Question:   Certification     Answer:   I certify that inpatient services are medically necessary for this patient for a duration of greater than two midnights  See H&P and MD Progress Notes for additional information about the patient's course of treatment  ED Arrival Information     Expected Arrival Acuity    3/31/2022 10:20 3/31/2022 10:48 Urgent         Means of arrival Escorted by Service Admission type    [address] [facility] Neurosurgery Urgent         Arrival complaint    infection of surgical site        Chief Complaint   Patient presents with    Wound Infection     Pt had neck surgery for spinal stenosis 2 weeks ago, incision on the back of neck is purulent/red/draining  Sent after post op eval for admission and wash out       Initial Presentation: 71 y o  male who presented self from Neurosurgery Outpatient Follow-up to [address] [place] [address] ED  Inpatient admission under Neurosurgery Service for evaluation and treatment of surgical site infection  PMHx: Factor V Leiden, history of DVT/PE, paroxysmal atrial fibrillation, sleep apnea on CPAP, s/p ACDF C5-7 & PCDF C3-T1 on 03/11/22  Presented w/ incisional drainage that is purulent and mucoid seeping zak chatterjeee and mild L neck pain  PO ABX for 6 days without improvement   On exam, dry mucous membranes, cervical tenderness, erythema w/ warmth and induration inferior greater than superior incision, active purulent drainage w/ applied pressure, neurologically intact  Plan: monitor neuro exam, hold ABX for anticipated need for cultures of wound, follow blood cultures, [place] collar at all times, analgesics prn, likely will require surgical washout where cultures of wound will be obtained, NPO after midnight, hold AC/AP in anticipation of surgical intervention, trend labs  ID consulted  03/31/22 Infectious Diseases:  Pt w/ post-operative wound infection  CT spine shows large posterior soft tissue fluid collection w/ suspicious morphology for infection  On exam, cervical wound w/ markered surrounding erythema and areas of pustule formation w/ sinus tracks and sanguinopurulent discharge  Plan: cultures to be taken of deep sinus tracks w/ purulent material, once cultures obtained, begin IV vanco and cefepime, pt will need surgical washout once INR normalizes, will need at least 4 weeks of IV ABX followed by PO ABX  Date: 04/01/22   Day 2: Pt w/ some left-sided neck pain, no new numbness or weakness, continues to endorse improvement of numbness at R anterior thigh  On exam, cervical brace, cervical incision posteriorly w/ inferior dehiscence  INR 2 26 today, will reverse pharmacologically  Washout of cervical wound planned for this afternoon  Continue IV ABX per ID, now that wound cultures were obtained  PT/OT evals post-washout      04/01/22 Surgery  Procedure: Posterior cervical evacuation of postoperative collection and debridement with placement of drains C3-T1 (N/A)  Indication: Status post cervical spinal fusion [Z98 1], Surgical site infection [T81 49XA]  Anesthesia: General  Findings: Multiloculated liquified seroma with regions of purulence; fat necrosis in some regions; MARCIN drains x2     ED Triage Vitals [03/31/22 1106]   Temperature Pulse Respirations Blood Pressure SpO2   98 °F (36 7 °C) 69 18 142/86 95 %      Temp Source Heart Rate Source Patient Position - Orthostatic VS BP Location FiO2 (%)   Oral Monitor -- -- --      Pain Score       4 Wt Readings from Last 1 Encounters:   03/31/22 107 kg (235 lb)     Additional Vital Signs:  Date/Time Temp Pulse Resp BP SpO2 O2 Device   04/01/22 2249 97 8 °F (36 6 °C) 61 16 130/69 97 % --   04/01/22 1958 98 °F (36 7 °C) 66 16 160/86 97 % --   04/01/22 1830 97 8 °F (36 6 °C) 68 17 153/79 96 % --   04/01/22 1800 -- 62 16 146/72 97 % Nasal Cannula   04/01/22 1745 -- 64 16 151/72 98 % Nasal Cannula   04/01/22 1730 -- 64 13 149/72 95 % Nasal Cannula   04/01/22 1715 -- 64 19 140/72 93 % Nasal Cannula   04/01/22 1700 98 °F (36 7 °C) 64 -- 145/79 93 % Nasal Cannula     Date/Time Temp Pulse Resp BP MAP (mmHg) SpO2 O2 Device   04/01/22 12:24:40 98 6 °F (37 °C) 62 17 153/79 104 96 % --   04/01/22 08:42:17 -- 69 -- 156/81 106 96 % --   04/01/22 06:07:50 98 3 °F (36 8 °C) 66 19 125/53 77 94 % --   03/31/22 22:09:10 98 2 °F (36 8 °C) 66 18 133/75 94 95 % --   03/31/22 1901 -- -- -- -- -- 94 % None (Room air)   03/31/22 16:01:47 98 7 °F (37 1 °C) 62 19 153/88 110 96 % --   03/31/22 1345 -- 64 -- -- -- 96 % --   03/31/22 1130 -- 64 -- -- -- 97 % --       Pertinent Labs/Diagnostic Test Results:   CT spine cervical w contrast   Final Result by Ana Rosa Araujo MD (03/31 1609)      Large posterior soft tissue fluid collection, morphology suspicious for infection as clinically suspected  Study is nondiagnostic for evaluation of the central canal and epidural abscess  MR is better suited intracanalicular evaluation although will be limited by artifact from metal hardware  No specific findings of osteomyelitis           Workstation performed: [id number]             Results from last 7 days   Lab Units 03/31/22  1618 03/29/22  0606 03/28/22  0501   WBC Thousand/uL 6 86 8 31 8 76   HEMOGLOBIN g/dL 10 4* 9 4* 9 5*   HEMATOCRIT % 32 0* 28 8* 29 8*   PLATELETS Thousands/uL 365 291 273   NEUTROS ABS Thousands/µL 4 77 6 75 7 29     Results from last 7 days   Lab Units 03/31/22  1618 03/29/22  0606 03/28/22  0501   SODIUM mmol/L 134* 134* 135*   POTASSIUM mmol/L 4 0 3 2* 3 1*   CHLORIDE mmol/L 98* 99* 97*   CO2 mmol/L 31 31 31   ANION GAP mmol/L 5 4 7   BUN mg/dL 13 11 14   CREATININE mg/dL 0 64 0 53* 0 65   EGFR ml/min/1 73sq m 98 106 97   CALCIUM mg/dL 9 8 9 0 9 1   MAGNESIUM mg/dL  --  1 9  --      Results from last 7 days   Lab Units 03/31/22  1618 03/29/22  0606 03/28/22  0501   GLUCOSE RANDOM mg/dL 101 126 107     Results from last 7 days   Lab Units 04/01/22  1148 03/31/22  1618 03/29/22  0606   PROTIME seconds 23 8* 24 7* 29 3*   INR  2 26* 2 37* 2 96*   PTT seconds 50* 50*  --      Results from last 7 days   Lab Units 03/31/22  1836 03/31/22  1619   BLOOD CULTURE   --  Received in Microbiology Lab  Culture in Progress  Received in Microbiology Lab  Culture in Progress  GRAM STAIN RESULT  2+ Polys*  2+ Gram positive cocci in clusters*  --    WOUND CULTURE  2+ Growth of Staphylococcus aureus*  --        ED Treatment:   Medication Administration from 03/31/2022 1020 to 03/31/2022 1546       Date/Time Order Dose Route Action     03/31/2022 1523 iohexol (OMNIPAQUE) 350 MG/ML injection (SINGLE-DOSE) 100 mL 100 mL Intravenous Given        Past Medical History:   Diagnosis Date    Acute venous embolism and thrombosis of deep vessels of proximal lower extremity (HCC)     Last assessed: 5/18/15    Arthritis     Cellulitis     LE    CPAP (continuous positive airway pressure) dependence     Factor V Leiden ([facility] )     Forgetfulness     [place] (hard of hearing)     Paroxysmal atrial fibrillation ([address] )     Last assessed: 11/2/15    Sleep apnea      Present on Admission:   Factor V Leiden mutation ([address] )   At risk for venous thromboembolism (VTE)   Cervical myelopathy (HCC)   Paroxysmal A-fib (HCC)   WILL (obstructive sleep apnea)   Mixed hyperlipidemia   Essential hypertension      Admitting Diagnosis: Infection [B99 9]  Cervical myelopathy (HCC) [G95 9]  Factor V Leiden mutation ([address] ) [D68 51]  Wound infection [T14  8XXA, L08 9]  Warfarin anticoagulation [Z79 01]  Paroxysmal A-fib (HCC) [I48 0]  Age/Sex: 71 y o  male  Admission Orders:   NPO  Incentive Spirometry  CPAP @ HS  SCDs  Scheduled Medications:  amLODIPine, 5 mg, Oral, Daily  cefepime, 2,000 mg, Intravenous, Q12H  docusate sodium, 100 mg, Oral, BID  DULoxetine, 60 mg, Oral, Daily  flecainide, 100 mg, Oral, BID  gabapentin, 100 mg, Oral, Daily  gabapentin, 300 mg, Oral, HS  metoprolol succinate, 100 mg, Oral, Daily  neomycin-polymyxin B (NEOSPORIN ) irrigation solution, , Irrigation, Once  potassium chloride, 40 mEq, Oral, Daily  pravastatin, 40 mg, Oral, Daily  Kcentra (PROTHROMBIN), 25 Units/kg, Intravenous, Once  senna, 8 6 mg, Oral, Daily  tamsulosin, 0 4 mg, Oral, Daily With Dinner  vancomycin, 20 mg/kg (Adjusted), Intravenous, Q12H    Continuous IV Infusions:  sodium chloride, 125 mL/hr, Intravenous, Continuous    PRN Meds:  acetaminophen, 975 mg, Oral, TID PRN  ondansetron, 4 mg, Intravenous, Q6H PRN  oxyCODONE, 2 5 mg, Oral, Q4H PRN  oxyCODONE, 5 mg, Oral, Q4H PRN; 3/31 x1, 4/1 x1        IP CONSULT TO INFECTIOUS DISEASES  IP CONSULT TO INTERNAL MEDICINE  IP CONSULT TO PHARMACY    Network Utilization Review Department  ATTENTION: Please call with any questions or concerns to [phone] and carefully listen to the prompts so that you are directed to the right person  All voicemails are confidential   Roxana Luis all requests for admission clinical reviews, approved or denied determinations and any other requests to dedicated fax number below belonging to the campus where the patient is receiving treatment   List of dedicated fax numbers for the Facilities:  [address] [place] [address] DENIALS (Administrative/Medical Necessity) [phone]   [address] [place] [address] (Maternity/NICU/Pediatrics) [address] [facility],[address]  [address] [address] [place] [address] [address] [place] [address] [phone]

## 2022-04-01 NOTE — ASSESSMENT & PLAN NOTE
POD 20 Anterior cervical discectomy and fixation fusion C5/6 and C6/7; Posterior cervical decompression and instrumented fusion C3-T1 with Dr Nancy Lee on 3/11/22    Imaging:    CT Cervical w/ contrast ordered in ED- waiting for final read    Plan:   Continue to monitor neurological exam and symptoms   Holding antibiotics for now as patient systemic well with anticipated need for cultures   Labs ordered- CBC w/ diff, BMP, ESR/CRP, blood cultures x2, aPTT, INR  · DVT PPX: SCDs, def pharm dvt ppx as recently on coumadin with last dose 3/31    [place] collar to remain in place at all times, okay for [place] collar for showering  · SLIM and ID consulted  · Will admit with pain medication and work-up as above  · Given overall incisional appearance, purulent drainage and no improvement with 6 days of oral Keflex, discussed anticipated need for surgical washout  · Discussed proceed along with obtaining intraoperative cultures  · Will await labs and CT to determine final plan of care  · Will make NPO after midnight in the event imaging/labs show surgery to be completed tomorrow  Neurosurgery will follow as primary, call with any further questions or concerns

## 2022-04-01 NOTE — PROGRESS NOTES
Vancomycin IV Pharmacy-to-Dose Consultation    Narinder Marcus is a 71 y o  male who is currently receiving Vancomycin IV with management by the Pharmacy Consult service for the treatment of skin-soft tissue infection,other bone/joint  Assessment/Plan:    The patient's chart was reviewed  Renal function is stable  There are no signs or symptoms of nephrotoxicity and/or infusion reactions documented  The following nephrotoxicity factors are present:  Medications: none  Patient-Factors: elderly    Based on today's assessment, will continue current vancomycin (Day # 2) dosing of 1750 mg q12h, with a plan for trough to be drawn at 0700 on 4/2  We will continue to follow the patient's culture results and clinical progress daily        Toshia Suarez, Pharmacist

## 2022-04-01 NOTE — CASE MANAGEMENT
Case Management Assessment & Discharge Planning Note    Patient name Gina Hooper  Location [facility] 608/[facility] 205-32 MRN [id number]  : 1950 Date 2022       Current Admission Date: 3/31/2022  Current Admission Diagnosis:Factor V Leiden mutation [facility])   Patient Active Problem List    Diagnosis Date Noted    Infection of superficial incisional surgical site after procedure 2022    Status post cervical spinal fusion 2022    Hypokalemia 2022    Warfarin anticoagulation 2022    At risk for venous thromboembolism (VTE) 2022    Anemia 2022    Head pain cephalgia 2022    Hyponatremia 2022    Urinary dysfunction 2022    Pain 2022    Muscle spasm 2022    Goals of care, counseling/discussion 2022    Sinus bradycardia 03/10/2022    Cervical myelopathy ([address] ) 2022    Ambulatory dysfunction 2022    Weakness 2022    Pes anserinus bursitis of right knee 2021    IFG (impaired fasting glucose) 2021    Memory difficulty 03/10/2021    History of DVT of lower extremity 2021    Mixed hyperlipidemia 10/27/2020    Paroxysmal A-fib ([address] ) 2020    Lower extremity edema 2020    Primary osteoarthritis of left knee 2020    Primary osteoarthritis of right knee 2020    Depression, major, single episode, moderate ([address] ) 10/30/2017    Insomnia 03/10/2017    Lumbar herniated disc 03/10/2017    Erectile dysfunction 2016    Status post catheter ablation of atrial flutter 2015    Dyslipidemia 2015    Essential hypertension 2015    WILL (obstructive sleep apnea) 2015    Factor V Leiden mutation ([address] ) 2014      LOS (days): 1  Geometric Mean LOS (GMLOS) (days):   Days to GMLOS:     OBJECTIVE:  PATIENT READMITTED TO HOSPITAL  Risk of Unplanned Readmission Score: 22         Current admission status: Inpatient       Preferred Pharmacy:   [facility]  [address], [address]  [address], [address]  [place] C[place] [address]  Phone: [phone] Fax: [phone]    [id number] - [address] [place] [address]  Phone: [phone] Fax: [phone]    Primary Care Provider: Kristine Lam MD    Primary Insurance: [facility]  Secondary Insurance:     ASSESSMENT:  [address], [address] [facility] - Spouse   Primary Phone: [phone] (Mobile)  Home Phone: [phone]               Advance Directives  Does patient have a [address]?: No  Was patient offered paperwork?: No  Does patient currently have a Health Care decision maker?: No  Does patient have Advance Directives?: No  Was patient offered paperwork?: No         Readmission Root Cause  30 Day Readmission: Yes  Who directed you to return to the hospital?: Specialist  Did you understand whom to contact if you had questions or problems?: Yes  Did you get your prescriptions before you left the hospital?: Yes  Were you able to get your prescriptions filled when you left the hospital?: Yes  Did you take your medications as prescribed?: Yes  Were you able to get to your follow-up appointments?: Yes  During previous admission, was a post-acute recommendation made?: Yes  What post-acute resources were offered?: OP Therapy  Patient was readmitted due to: possible infection  Action Plan: will await medical stability    Patient Information  Admitted from[de-identified] Home  Mental Status: Alert  During Assessment patient was accompanied by: Not accompanied during assessment  Assessment information provided by[de-identified] Patient  Primary Caregiver: Self  Support Systems: Family members,Spouse/significant other  [place] of Residence: [address] [facility] do you live in?: [facility] entry access options   Select all that apply : Stairs  Number of steps to enter home : 5  Do the steps have railings?: No  Type of Current Residence: 3 story home  Upon entering residence, is there a bedroom on the main floor (no further steps)?: No  A bedroom is located on the following floor levels of residence (select all that apply):: 2nd Floor  Upon entering residence, is there a bathroom on the main floor (no further steps)?: Yes  Number of steps to 2nd floor from main floor: One Flight  In the last 12 months, was there a time when you were not able to pay the mortgage or rent on time?: No  In the last 12 months, how many places have you lived?: 1  In the last 12 months, was there a time when you did not have a steady place to sleep or slept in a shelter (including now)?: No  Homeless/housing insecurity resource given?: N/A  Living Arrangements: Lives w/ Spouse/significant other  Is patient a ?: No    Activities of Daily Living Prior to Admission  Functional Status: Independent  Completes ADLs independently?: Yes  Ambulates independently?: Yes  Does patient use assisted devices?: Yes  Assisted Devices (DME) used: Straight Cane,Walker,Bedside Commode  Does patient currently own DME?: Yes  What DME does the patient currently own?: Bedside Commode,Straight Cane,Walker  Does patient have a history of Outpatient Therapy (PT/OT)?: No  Does the patient have a history of Short-Term Rehab?: No  Does patient have a history of HHC?: No  Does patient currently have [facility]?: No         Patient Information Continued  Income Source: Pension/FDC  Does patient have prescription coverage?: Yes  Within the past 12 months, you worried that your food would run out before you got the money to buy more : Never true  Within the past 12 months, the food you bought just didnt last and you didnt have money to get more : Never true  Food insecurity resource given?: N/A  Does patient receive dialysis treatments?: No  Does patient have a history of substance abuse?: No  Does patient have a history of Mental Health Diagnosis?: No    PHQ 2/9 Screening   Reviewed PHQ 2/9 Depression Screening Score?: No    Means of Transportation  Means of Transport to Appts[de-identified] Family transport  In the past 12 months, has lack of transportation kept you from medical appointments or from getting medications?: No  In the past 12 months, has lack of transportation kept you from meetings, work, or from getting things needed for daily living?: No  Was application for public transport provided?: N/A        DISCHARGE DETAILS:    Discharge planning discussed with[de-identified] Patient  Freedom of Choice: Yes  Comments - Freedom of Choice: Discussed FOC  CM contacted family/caregiver?: No- see comments (Declined)      Contacts  Patient Contacts: Janet Monroy  Relationship to Patient[de-identified] Family  Contact Method: Phone  Phone Number: [phone]  Reason/Outcome: Discharge Planning,Continuity of Care,Emergency Contact     CM reviewed d/c planning process including the following: identifying help at home, patient preference for d/c planning needs, Discharge Lounge, [facility] Meds to Bed program, availability of treatment team to discuss questions or concerns patient and/or family may have regarding understanding medications and recognizing signs and symptoms once discharged  CM also encouraged patient to follow up with all recommended appointments after discharge  Patient advised of importance for patient and family to participate in managing patients medical well being

## 2022-04-01 NOTE — ANESTHESIA POSTPROCEDURE EVALUATION
Post-Op Assessment Note    CV Status:  Stable  Pain Score: 0    Pain management: adequate     Mental Status:  Alert and awake   Hydration Status:  Euvolemic   PONV Controlled:  Controlled   Airway Patency:  Patent      Post Op Vitals Reviewed: Yes      Staff: CRNA         No complications documented      BP   145/79   Temp   97 5   Pulse  69   Resp   20   SpO2   96

## 2022-04-01 NOTE — ASSESSMENT & PLAN NOTE
History of cervical myelopathy, status post anterior cervical discectomy and fixation fusion C5/6 and C6/7; Posterior cervical decompression and instrumented fusion C3-T1 with Dr Soumya Hensley on 3/11/22  Discharged to [facility] for rehab on 3/18, then discharged home on 3/29  Noted to have increased redness and drainage at incision site, no improvement after 6 days of Keflex  Referred to ED from OP office visit for further management  · CT cervical spine: Large posterior soft tissue fluid collection, morphology suspicious for infection as clinically suspected  Study is nondiagnostic for evaluation of the central canal and epidural abscess  No specific findings of osteomyelitis     · Admitted under neurosurgery service  · Tentative plans for operative washout later today  · ID following and managing antibiotics  · Wound cultures collected and pending   · Currently on cefepime and vanco  · Will likely need 4 weeks IV antibiotics followed by PO  · Analgesics as needed   · Would recommend PT/OT evaluations prior to d/c

## 2022-04-01 NOTE — ASSESSMENT & PLAN NOTE
· On coumadin outpatient   · AC on hold in anticipation for OR later today  · INR 2 37 on last check yesterday, today's labs pending  · May require reversal agent prior to surgery, defer to neurosurgery    · Will likely require monitoring on heparin gtt postop

## 2022-04-01 NOTE — PLAN OF CARE
Problem: PAIN - ADULT  Goal: Verbalizes/displays adequate comfort level or baseline comfort level  Description: Interventions:  - Encourage patient to monitor pain and request assistance  - Assess pain using appropriate pain scale  - Administer analgesics based on type and severity of pain and evaluate response  - Implement non-pharmacological measures as appropriate and evaluate response  - Consider cultural and social influences on pain and pain management  - Notify physician/advanced practitioner if interventions unsuccessful or patient reports new pain  Outcome: Progressing     Problem: INFECTION - ADULT  Goal: Absence or prevention of progression during hospitalization  Description: INTERVENTIONS:  - Assess and monitor for signs and symptoms of infection  - Monitor lab/diagnostic results  - Monitor all insertion sites, i e  indwelling lines, tubes, and drains  - Monitor endotracheal if appropriate and nasal secretions for changes in amount and color  - [place] appropriate cooling/warming therapies per order  - Administer medications as ordered  - Instruct and encourage patient and family to use good hand hygiene technique  - Identify and instruct in appropriate isolation precautions for identified infection/condition  Outcome: Progressing  Goal: Absence of fever/infection during neutropenic period  Description: INTERVENTIONS:  - Monitor WBC    Outcome: Progressing

## 2022-04-02 LAB
ABO GROUP BLD: NORMAL
ANION GAP SERPL CALCULATED.3IONS-SCNC: 5 MMOL/L (ref 4–13)
APTT PPP: 36 SECONDS (ref 23–37)
APTT PPP: 44 SECONDS (ref 23–37)
BACTERIA WND AEROBE CULT: ABNORMAL
BLD GP AB SCN SERPL QL: NEGATIVE
BUN SERPL-MCNC: 15 MG/DL (ref 5–25)
CALCIUM SERPL-MCNC: 10.3 MG/DL (ref 8.3–10.1)
CHLORIDE SERPL-SCNC: 103 MMOL/L (ref 100–108)
CO2 SERPL-SCNC: 29 MMOL/L (ref 21–32)
CREAT SERPL-MCNC: 0.88 MG/DL (ref 0.6–1.3)
ERYTHROCYTE [DISTWIDTH] IN BLOOD BY AUTOMATED COUNT: 13.1 % (ref 11.6–15.1)
GFR SERPL CREATININE-BSD FRML MDRD: 86 ML/MIN/1.73SQ M
GLUCOSE SERPL-MCNC: 225 MG/DL (ref 65–140)
GRAM STN SPEC: ABNORMAL
GRAM STN SPEC: ABNORMAL
HCT VFR BLD AUTO: 29.9 % (ref 36.5–49.3)
HGB BLD-MCNC: 9.8 G/DL (ref 12–17)
INR PPP: 1.44 (ref 0.84–1.19)
MCH RBC QN AUTO: 30.3 PG (ref 26.8–34.3)
MCHC RBC AUTO-ENTMCNC: 32.8 G/DL (ref 31.4–37.4)
MCV RBC AUTO: 93 FL (ref 82–98)
PLATELET # BLD AUTO: 347 THOUSANDS/UL (ref 149–390)
PMV BLD AUTO: 8.7 FL (ref 8.9–12.7)
POTASSIUM SERPL-SCNC: 4 MMOL/L (ref 3.5–5.3)
PROTHROMBIN TIME: 16.9 SECONDS (ref 11.6–14.5)
RBC # BLD AUTO: 3.23 MILLION/UL (ref 3.88–5.62)
RH BLD: POSITIVE
SODIUM SERPL-SCNC: 137 MMOL/L (ref 136–145)
SPECIMEN EXPIRATION DATE: NORMAL
VANCOMYCIN TROUGH SERPL-MCNC: 17 UG/ML (ref 10–20)
WBC # BLD AUTO: 8.12 THOUSAND/UL (ref 4.31–10.16)

## 2022-04-02 PROCEDURE — 80048 BASIC METABOLIC PNL TOTAL CA: CPT | Performed by: NURSE PRACTITIONER

## 2022-04-02 PROCEDURE — 85027 COMPLETE CBC AUTOMATED: CPT | Performed by: NURSE PRACTITIONER

## 2022-04-02 PROCEDURE — 36569 INSJ PICC 5 YR+ W/O IMAGING: CPT

## 2022-04-02 PROCEDURE — 85730 THROMBOPLASTIN TIME PARTIAL: CPT | Performed by: NURSE PRACTITIONER

## 2022-04-02 PROCEDURE — 05HY33Z INSERTION OF INFUSION DEVICE INTO UPPER VEIN, PERCUTANEOUS APPROACH: ICD-10-PCS | Performed by: NEUROLOGICAL SURGERY

## 2022-04-02 PROCEDURE — C1751 CATH, INF, PER/CENT/MIDLINE: HCPCS

## 2022-04-02 PROCEDURE — 97163 PT EVAL HIGH COMPLEX 45 MIN: CPT

## 2022-04-02 PROCEDURE — 85610 PROTHROMBIN TIME: CPT | Performed by: NURSE PRACTITIONER

## 2022-04-02 PROCEDURE — 80202 ASSAY OF VANCOMYCIN: CPT | Performed by: INTERNAL MEDICINE

## 2022-04-02 PROCEDURE — 86900 BLOOD TYPING SEROLOGIC ABO: CPT | Performed by: NURSE PRACTITIONER

## 2022-04-02 PROCEDURE — 97166 OT EVAL MOD COMPLEX 45 MIN: CPT

## 2022-04-02 PROCEDURE — 85730 THROMBOPLASTIN TIME PARTIAL: CPT | Performed by: NEUROLOGICAL SURGERY

## 2022-04-02 PROCEDURE — 99232 SBSQ HOSP IP/OBS MODERATE 35: CPT | Performed by: PHYSICIAN ASSISTANT

## 2022-04-02 PROCEDURE — 86850 RBC ANTIBODY SCREEN: CPT | Performed by: NURSE PRACTITIONER

## 2022-04-02 PROCEDURE — 99233 SBSQ HOSP IP/OBS HIGH 50: CPT | Performed by: INTERNAL MEDICINE

## 2022-04-02 PROCEDURE — 86901 BLOOD TYPING SEROLOGIC RH(D): CPT | Performed by: NURSE PRACTITIONER

## 2022-04-02 PROCEDURE — 99024 POSTOP FOLLOW-UP VISIT: CPT | Performed by: NURSE PRACTITIONER

## 2022-04-02 PROCEDURE — 94760 N-INVAS EAR/PLS OXIMETRY 1: CPT

## 2022-04-02 RX ORDER — METHOCARBAMOL 750 MG/1
750 TABLET, FILM COATED ORAL EVERY 6 HOURS PRN
Status: DISCONTINUED | OUTPATIENT
Start: 2022-04-02 | End: 2022-04-15 | Stop reason: HOSPADM

## 2022-04-02 RX ADMIN — ACETAMINOPHEN 975 MG: 325 TABLET ORAL at 17:47

## 2022-04-02 RX ADMIN — PRAVASTATIN SODIUM 40 MG: 40 TABLET ORAL at 08:09

## 2022-04-02 RX ADMIN — DULOXETINE HYDROCHLORIDE 60 MG: 60 CAPSULE, DELAYED RELEASE ORAL at 08:09

## 2022-04-02 RX ADMIN — POTASSIUM CHLORIDE 40 MEQ: 20 TABLET, EXTENDED RELEASE ORAL at 08:09

## 2022-04-02 RX ADMIN — SENNOSIDES 8.6 MG: 8.6 TABLET, FILM COATED ORAL at 08:09

## 2022-04-02 RX ADMIN — VANCOMYCIN HYDROCHLORIDE 1750 MG: 5 INJECTION, POWDER, LYOPHILIZED, FOR SOLUTION INTRAVENOUS at 11:30

## 2022-04-02 RX ADMIN — OXYCODONE HYDROCHLORIDE 5 MG: 5 TABLET ORAL at 23:20

## 2022-04-02 RX ADMIN — VANCOMYCIN HYDROCHLORIDE 1750 MG: 5 INJECTION, POWDER, LYOPHILIZED, FOR SOLUTION INTRAVENOUS at 00:48

## 2022-04-02 RX ADMIN — METOPROLOL SUCCINATE 100 MG: 100 TABLET, EXTENDED RELEASE ORAL at 08:10

## 2022-04-02 RX ADMIN — AMLODIPINE BESYLATE 5 MG: 5 TABLET ORAL at 08:09

## 2022-04-02 RX ADMIN — DOCUSATE SODIUM 100 MG: 100 CAPSULE, LIQUID FILLED ORAL at 08:08

## 2022-04-02 RX ADMIN — FLECAINIDE ACETATE 100 MG: 100 TABLET ORAL at 08:10

## 2022-04-02 RX ADMIN — DOCUSATE SODIUM 100 MG: 100 CAPSULE, LIQUID FILLED ORAL at 17:48

## 2022-04-02 RX ADMIN — CEFEPIME HYDROCHLORIDE 2000 MG: 2 INJECTION, POWDER, FOR SOLUTION INTRAVENOUS at 10:42

## 2022-04-02 RX ADMIN — SODIUM CHLORIDE 75 ML/HR: 0.9 INJECTION, SOLUTION INTRAVENOUS at 10:39

## 2022-04-02 RX ADMIN — GABAPENTIN 100 MG: 100 CAPSULE ORAL at 08:08

## 2022-04-02 RX ADMIN — GABAPENTIN 300 MG: 300 CAPSULE ORAL at 21:48

## 2022-04-02 RX ADMIN — HEPARIN SODIUM 11.1 UNITS/KG/HR: 10000 INJECTION, SOLUTION INTRAVENOUS; SUBCUTANEOUS at 11:33

## 2022-04-02 RX ADMIN — OXYCODONE HYDROCHLORIDE 2.5 MG: 5 TABLET ORAL at 14:34

## 2022-04-02 RX ADMIN — FLECAINIDE ACETATE 100 MG: 100 TABLET ORAL at 17:48

## 2022-04-02 RX ADMIN — TAMSULOSIN HYDROCHLORIDE 0.4 MG: 0.4 CAPSULE ORAL at 17:48

## 2022-04-02 RX ADMIN — VANCOMYCIN HYDROCHLORIDE 1750 MG: 5 INJECTION, POWDER, LYOPHILIZED, FOR SOLUTION INTRAVENOUS at 23:11

## 2022-04-02 NOTE — ASSESSMENT & PLAN NOTE
History of factor five Leiden along with DVT/PE along with Afib  Last dose coumadin 3/31  INR 2 37 on 3/31  Start heparin gtt this am 4/2 low dose protocol with eventual bridge to Coumadin

## 2022-04-02 NOTE — ASSESSMENT & PLAN NOTE
· Continue with home dose BB   · Neurosurgery has ordered heparin gtt to start today 4/2  · Monitor PT/INR

## 2022-04-02 NOTE — ASSESSMENT & PLAN NOTE
Form is done, placed in form box in reception area History of cervical myelopathy, status post anterior cervical discectomy and fixation fusion C5/6 and C6/7; Posterior cervical decompression and instrumented fusion C3-T1 with Dr Balaji Washington on 3/11/22  Discharged to [facility] for rehab on 3/18, then discharged home on 3/29  Noted to have increased redness and drainage at incision site, no improvement after 6 days of Keflex  Referred to ED from OP office visit for further management  · CT cervical spine: Large posterior soft tissue fluid collection, morphology suspicious for infection as clinically suspected  Study is nondiagnostic for evaluation of the central canal and epidural abscess  No specific findings of osteomyelitis     · Admitted under neurosurgery service  · Status post operative washout on 4/1   · ID following and managing antibiotics  · Wound cultures 3/31 preliminarily growing staph aureus    · Follow-up OR cultures   · Currently on cefepime and vanco  · Will likely need 4 weeks IV antibiotics followed by PO  · Blood cultures negative x 24 hours  · Analgesics as needed   · PT/OT evaluations pending

## 2022-04-02 NOTE — PLAN OF CARE
Problem: PAIN - ADULT  Goal: Verbalizes/displays adequate comfort level or baseline comfort level  Description: Interventions:  - Encourage patient to monitor pain and request assistance  - Assess pain using appropriate pain scale  - Administer analgesics based on type and severity of pain and evaluate response  - Implement non-pharmacological measures as appropriate and evaluate response  - Consider cultural and social influences on pain and pain management  - Notify physician/advanced practitioner if interventions unsuccessful or patient reports new pain  Outcome: Progressing     Problem: INFECTION - ADULT  Goal: Absence or prevention of progression during hospitalization  Description: INTERVENTIONS:  - Assess and monitor for signs and symptoms of infection  - Monitor lab/diagnostic results  - Monitor all insertion sites, i e  indwelling lines, tubes, and drains  - Monitor endotracheal if appropriate and nasal secretions for changes in amount and color  - [place] appropriate cooling/warming therapies per order  - Administer medications as ordered  - Instruct and encourage patient and family to use good hand hygiene technique  - Identify and instruct in appropriate isolation precautions for identified infection/condition  Outcome: Progressing  Goal: Absence of fever/infection during neutropenic period  Description: INTERVENTIONS:  - Monitor WBC    Outcome: Progressing     Problem: SAFETY ADULT  Goal: Patient will remain free of falls  Description: INTERVENTIONS:  - Educate patient/family on patient safety including physical limitations  - Instruct patient to call for assistance with activity   - Consult OT/PT to assist with strengthening/mobility   - Keep Call bell within reach  - Keep bed low and locked with side rails adjusted as appropriate  - Keep care items and personal belongings within reach  - Initiate and maintain comfort rounds  - Make Fall Risk Sign visible to staff  - Offer Toileting   - Obtain necessary fall risk management equipment  - Apply yellow socks and bracelet for high fall risk patients  - Consider moving patient to room near nurses station  Outcome: Progressing  Goal: Maintain or return to baseline ADL function  Description: INTERVENTIONS:  -  Assess patient's ability to carry out ADLs; assess patient's baseline for ADL function and identify physical deficits which impact ability to perform ADLs (bathing, care of mouth/teeth, toileting, grooming, dressing, etc )  - Assess/evaluate cause of self-care deficits   - Assess range of motion  - Assess patient's mobility; develop plan if impaired  - Assess patient's need for assistive devices and provide as appropriate  - Encourage maximum independence but intervene and supervise when necessary  - Involve family in performance of ADLs  - Assess for home care needs following discharge   - Consider OT consult to assist with ADL evaluation and planning for discharge  - Provide patient education as appropriate  Outcome: Progressing  Goal: Maintains/Returns to pre admission functional level  Description: INTERVENTIONS:  - Perform BMAT or MOVE assessment daily    - Set and communicate daily mobility goal to care team and patient/family/caregiver     - Collaborate with rehabilitation services on mobility goals if consulted  - Out of bed for toileting  - Record patient progress and toleration of activity level   Outcome: Progressing     Problem: DISCHARGE PLANNING  Goal: Discharge to home or other facility with appropriate resources  Description: INTERVENTIONS:  - Identify barriers to discharge w/patient and caregiver  - Arrange for needed discharge resources and transportation as appropriate  - Identify discharge learning needs (meds, wound care, etc )  - Arrange for interpretive services to assist at discharge as needed  - Refer to Case Management Department for coordinating discharge planning if the patient needs post-hospital services based on physician/advanced practitioner order or complex needs related to functional status, cognitive ability, or social support system  Outcome: Progressing     Problem: Knowledge Deficit  Goal: Patient/family/caregiver demonstrates understanding of disease process, treatment plan, medications, and discharge instructions  Description: Complete learning assessment and assess knowledge base    Interventions:  - Provide teaching at level of understanding  - Provide teaching via preferred learning methods  Outcome: Progressing     Problem: Potential for Falls  Goal: Patient will remain free of falls  Description: INTERVENTIONS:  - Educate patient/family on patient safety including physical limitations  - Instruct patient to call for assistance with activity   - Consult OT/PT to assist with strengthening/mobility   - Keep Call bell within reach  - Keep bed low and locked with side rails adjusted as appropriate  - Keep care items and personal belongings within reach  - Initiate and maintain comfort rounds  - Make Fall Risk Sign visible to staff  - Offer [address] necessary fall risk management equipment  - Apply yellow socks and bracelet for high fall risk patients  - Consider moving patient to room near nurses station  Outcome: Progressing     Problem: Potential for Falls  Goal: Patient will remain free of falls  Description: INTERVENTIONS:  - Educate patient/family on patient safety including physical limitations  - Instruct patient to call for assistance with activity   - Consult OT/PT to assist with strengthening/mobility   - Keep Call bell within reach  - Keep bed low and locked with side rails adjusted as appropriate  - Keep care items and personal belongings within reach  - Initiate and maintain comfort rounds  - Make Fall Risk Sign visible to staff  - Offer [address] necessary fall risk management equipment  - Apply yellow socks and bracelet for high fall risk patients  - Consider moving patient to room near nurses station  Outcome: Progressing     Problem: MOBILITY - ADULT  Goal: Maintain or return to baseline ADL function  Description: INTERVENTIONS:  -  Assess patient's ability to carry out ADLs; assess patient's baseline for ADL function and identify physical deficits which impact ability to perform ADLs (bathing, care of mouth/teeth, toileting, grooming, dressing, etc )  - Assess/evaluate cause of self-care deficits   - Assess range of motion  - Assess patient's mobility; develop plan if impaired  - Assess patient's need for assistive devices and provide as appropriate  - Encourage maximum independence but intervene and supervise when necessary  - Involve family in performance of ADLs  - Assess for home care needs following discharge   - Consider OT consult to assist with ADL evaluation and planning for discharge  - Provide patient education as appropriate  Outcome: Progressing  Goal: Maintains/Returns to pre admission functional level  Description: INTERVENTIONS:  - Perform BMAT or MOVE assessment daily    - Set and communicate daily mobility goal to care team and patient/family/caregiver     - Collaborate with rehabilitation services on mobility goals if consulted  - Out of bed for toileting  - Record patient progress and toleration of activity level   Outcome: Progressing     Problem: Prexisting or High Potential for Compromised Skin Integrity  Goal: Skin integrity is maintained or improved  Description: INTERVENTIONS:  - Identify patients at risk for skin breakdown  - Assess and monitor skin integrity  - Assess and monitor nutrition and hydration status  - Monitor labs   - Assess for incontinence   - Turn and reposition patient  - Assist with mobility/ambulation  - Relieve pressure over bony prominences  - Avoid friction and shearing  - Provide appropriate hygiene as needed including keeping skin clean and dry  - Evaluate need for skin moisturizer/barrier cream  - Collaborate with interdisciplinary team   - Patient/family teaching  - Consider wound care consult   Outcome: Progressing

## 2022-04-02 NOTE — PROGRESS NOTES
Vancomycin IV Pharmacy-to-Dose Consultation    Jef Mitchell is a 71 y o  male who is currently receiving Vancomycin IV with management by the Pharmacy Consult service  Relevant clinical data and objective / subjective history reviewed  Vancomycin Assessment:  1  Indication: postoperative cervical wound infection, rule out vertebral osteomyelitis  · Status: stable, afebrile, ID following  · Micro:   4/1 Anaerobic culture (back): in process  4/1 Tissue culture (back): 1+ GPC in pairs  4/1 AFB culture (back): in process  3/31 Wound culture (neck): 2+ growth of Staphylococcus aureus  3/31 Blood cultures x 2: no growth at 24 hours  2  Renal Function: stable, Scr 0 88, CrCl 85 mL/min; UOP 0 7 mL/kg/hr  3  Days of Therapy: 3  4  Current Dose: 1750 mg IV q12h (last dose prior to level: 4/2 at 0048; last dose: 4/2 at 1130)  5  Goal Trough: 15-20 (appropriate for most indications)   6  Goal AUC(24h): 400-600  7  Last Level: 17 (4/2 at 1042) 9 8 hours after last dose  8  Pharmacokinetic Analysis: Extrapolated trough at 12 hours is 13 9 with a therapeutic AUC of 564    Vancomycin Plan:  1  Dosing: continue 1750 mg IV q12h (next dose: 4/2 at 2330)  · Predicted Trough / AUC: 13 9 / 564  2  Next Level: Trough 4/5 at 1100  3  Renal Function Monitoring: Daily BMP and [place] 19 will continue to follow closely for s/sx of nephrotoxicity, infusion reactions and appropriateness of therapy  BMP and CBC will be ordered per protocol  We will continue to follow the patients culture results and clinical progress daily         Inna Brothers, PharmD, [address] Clinical Pharmacist  [phone]

## 2022-04-02 NOTE — PROGRESS NOTES
[address] [facility]  Progress Note - Emmanuel Ramos 1950, 71 y o  male MRN: [id number]  Unit/Bed#: [facility] 542-78 Encounter: [id number]  Primary Care Provider: Richar Cheng MD   Date and time admitted to hospital: 3/31/2022 10:58 AM    At risk for venous thromboembolism (VTE)  Assessment & Plan  History of factor five Leiden along with DVT/PE along with Afib  Last dose coumadin 3/31  INR 2 37 on 3/31  Start heparin gtt this am 4/2 low dose protocol with eventual bridge to Coumadin  Cervical myelopathy (HCC)  Assessment & Plan  POD 1 posterior cervical evacuation of postoperative collection and debridement with placement of drains C3-T1  · S/p Anterior cervical discectomy and fixation fusion C5/6 and C6/7; Posterior cervical decompression and instrumented fusion C3-T1 with Dr Pilar Mohan on 3/11/22    Imaging:   · CT Cervical w/ contrast 3/31/2022: Large posterior soft tissue fluid collection, morphology suspicious for infection as clinically suspected  Study is nondiagnostic for evaluation of the central canal and epidural abscess  MR is better suited intracanalicular evaluation although will be limited by artifact from metal hardware  Plan:   Continue to monitor neurological exam and symptoms   Blood Cx neg x 24 hours   Wound cx 3/31 + staph aureus   Intraop cx 4/1 gram positive cocci   [place] collar to remain in place at all times, okay for [place] collar for showering  · SLIM and ID consulted - appreciate recommendations  · PICC placed  · Maintain on cefepime and vancomycin per ID  · Mobilize with PT/TO  · DVT ppx: SCDs, on heparin gtt    Plan of care discussed with MIREILLE Nogueira  Neurosurgery will follow as primary, call with any further questions or concerns          Factor V Leiden mutation [facility])  Assessment & Plan  AC on hold for likely surgery  Heparin gtt to be started this am 4/2      Subjective/Objective   Chief Complaint:  I am doing very well     Subjective: States he is feeling well, unchanged from prior to washout  Denies new numbness or weakness  Denies pain down his arms  Tolerating PO  C/o minimal incisional pain  Objective: NAD  Chronic     I/O       03/31 0701 04/01 0700 04/01 0701 04/02 0700 04/02 0701 04/03 0700    P  O  240 150     I V  (mL/kg) 1125 (10 5) 1000 (9 3)     IV Piggyback 550 650     Total Intake(mL/kg) 1915 (17 9) 1800 (16 8)     Urine (mL/kg/hr) 600 1690 (0 7) 500 (2)    Drains  95     Total Output 600 1785 500    Net +1315 +15 -500           Unmeasured Urine Occurrence  1 x           Invasive Devices  Report    Peripheral Intravenous Line            Peripheral IV 03/31/22 Right Forearm 1 day    Peripheral IV 04/01/22 Left Forearm <1 day          Drain            Closed/Suction Drain Posterior; Left Neck Bulb 7 Fr  <1 day    Closed/Suction Drain Posterior;Right Neck Bulb 7 Fr  <1 day                Physical Exam:  Vitals: Blood pressure 110/50, pulse 65, temperature 98 2 °F (36 8 °C), resp  rate 18, height 5' 11" (1 803 m), weight 107 kg (235 lb), SpO2 95 %  ,Body mass index is 32 78 kg/m²          General appearance: alert, appears stated age, cooperative and no distress  Head: Normocephalic, without obvious abnormality, atraumatic  Eyes: EOMI, PERRL  Neck: cervical brace, dressing intact, 2 MARCIN drains  Back: no kyphosis present, no tenderness to percussion or palpation  Lungs: non labored breathing  Heart: regular heart rate  Neurologic:   Mental status: Alert, oriented x3, thought content appropriate  Cranial nerves: grossly intact (Cranial nerves II-XII)  Sensory: normal to LT  Motor: moving all extremities without focal weakness, baseline RUE weakness  Reflexes: 2+ and symmetric, no Jiménez's or clonus      Lab Results:  Results from last 7 days   Lab Units 03/31/22  1618 03/29/22  0606 03/28/22  0501   WBC Thousand/uL 6 86 8 31 8 76   HEMOGLOBIN g/dL 10 4* 9 4* 9 5*   HEMATOCRIT % 32 0* 28 8* 29 8*   PLATELETS Thousands/uL 365 291 273   NEUTROS PCT % 70 82* 84*   MONOS PCT % 6 6 4     Results from last 7 days   Lab Units 03/31/22  1618 03/29/22  0606 03/28/22  0501   POTASSIUM mmol/L 4 0 3 2* 3 1*   CHLORIDE mmol/L 98* 99* 97*   CO2 mmol/L 31 31 31   BUN mg/dL 13 11 14   CREATININE mg/dL 0 64 0 53* 0 65   CALCIUM mg/dL 9 8 9 0 9 1     Results from last 7 days   Lab Units 03/29/22  0606   MAGNESIUM mg/dL 1 9         Results from last 7 days   Lab Units 04/01/22  1148 03/31/22  1618 03/29/22  0606   INR  2 26* 2 37* 2 96*   PTT seconds 50* 50*  --      No results found for: TROPONINT  ABG:No results found for: PHART, GBS4CZT, PO2ART, UAR6CGT, R1WNKNZG, BEART, SOURCE    Imaging Studies: I have personally reviewed pertinent reports  and I have personally reviewed pertinent films in PACS    CT spine cervical w contrast    Result Date: 3/31/2022  Impression: Large posterior soft tissue fluid collection, morphology suspicious for infection as clinically suspected  Study is nondiagnostic for evaluation of the central canal and epidural abscess  MR is better suited intracanalicular evaluation although will be limited by artifact from metal hardware  No specific findings of osteomyelitis  Workstation performed: [id number]       EKG, Pathology, and Other Studies: I have personally reviewed pertinent reports        VTE Pharmacologic Prophylaxis: Sequential compression device (Venodyne)  and Heparin    VTE Mechanical Prophylaxis: sequential compression device

## 2022-04-02 NOTE — ASSESSMENT & PLAN NOTE
POD 1 posterior cervical evacuation of postoperative collection and debridement with placement of drains C3-T1  · S/p Anterior cervical discectomy and fixation fusion C5/6 and C6/7; Posterior cervical decompression and instrumented fusion C3-T1 with Dr Alessandra Orr on 3/11/22    Imaging:   · CT Cervical w/ contrast 3/31/2022: Large posterior soft tissue fluid collection, morphology suspicious for infection as clinically suspected  Study is nondiagnostic for evaluation of the central canal and epidural abscess  MR is better suited intracanalicular evaluation although will be limited by artifact from metal hardware  Plan:   Continue to monitor neurological exam and symptoms   Blood Cx neg x 24 hours   Wound cx 3/31 + staph aureus   Intraop cx 4/1 gram positive cocci   [place] collar to remain in place at all times, okay for [place] collar for showering   2 MARCIN drains:  o Left 55 mL/since OR  o Right 40 mL/since OR  · SLIM and ID consulted - appreciate recommendations  · PICC placed  · Maintain on cefepime and vancomycin per ID  · Mobilize with PT/TO  · DVT ppx: SCDs, on heparin gtt    Plan of care discussed with MIREILLE Zuluaga  Neurosurgery will follow as primary, call with any further questions or concerns

## 2022-04-02 NOTE — PROGRESS NOTES
[facility]  Progress Note - Praveen Hill 1950, 71 y o  male MRN: [id number]  Unit/Bed#: [facility] 182-80 Encounter: [id number]  Primary Care Provider: Gricel Turk MD   Date and time admitted to hospital: 3/31/2022 10:58 AM    * Surgical site infection  Assessment & Plan  History of cervical myelopathy, status post anterior cervical discectomy and fixation fusion C5/6 and C6/7; Posterior cervical decompression and instrumented fusion C3-T1 with Dr Jocelyne Melendrez on 3/11/22  Discharged to [facility] for rehab on 3/18, then discharged home on 3/29  Noted to have increased redness and drainage at incision site, no improvement after 6 days of Keflex  Referred to ED from OP office visit for further management  · CT cervical spine: Large posterior soft tissue fluid collection, morphology suspicious for infection as clinically suspected  Study is nondiagnostic for evaluation of the central canal and epidural abscess  No specific findings of osteomyelitis     · Admitted under neurosurgery service  · Status post operative washout on 4/1   · ID following and managing antibiotics  · Wound cultures 3/31 preliminarily growing staph aureus    · Follow-up OR cultures   · Currently on cefepime and vanco  · Will likely need 4 weeks IV antibiotics followed by PO  · Blood cultures negative x 24 hours  · Analgesics as needed   · PT/OT evaluations pending     Factor V Leiden mutation ([address] )  Assessment & Plan  · On coumadin outpatient   · INR 2 26 on last check yesterday, today's labs pending  · Plan to start heparin gtt today per neurosurgery     Paroxysmal [facility])  Assessment & Plan  · Continue with home dose BB   · Neurosurgery has ordered heparin gtt to start today 4/2  · Monitor PT/INR    Cervical myelopathy (HCC)  Assessment & Plan  · Status post cervical spinal fusion on 3/11/22 as above   · See related plan     Essential hypertension  Assessment & Plan  · BP acceptable, monitor routinely · Continue home dose Norvasc, metoprolol     Mixed hyperlipidemia  Assessment & Plan  · Continue statin     WILL (obstructive sleep apnea)  Assessment & Plan  · CPAP qhs       VTE Pharmacologic Prophylaxis: VTE Score: 10 High Risk (Score >/= 5) - Pharmacological DVT Prophylaxis Ordered: heparin drip  Sequential Compression Devices Ordered  Patient Centered Rounds: I performed bedside rounds with nursing staff today  Discussions with Specialists or Other Care Team Provider: primary RN, neurosurgery, case management     Education and Discussions with Family / Patient: defer family updates to primary service   Time Spent for Care: 15 minutes  More than 50% of total time spent on counseling and coordination of care as described above  Current Length of Stay: 2 day(s)  Current Patient Status: Inpatient   Certification Statement: The patient will continue to require additional inpatient hospital stay due to pending cultures, ID and neurosurgery clearance, therapy evaluations   Discharge Plan: Anticipate discharge in >72 hrs to discharge location to be determined pending rehab evaluations  Code Status: Level 1 - Full Code    Subjective:   Patient offers no new complaints today  Notes some left sided neck discomfort but states it is mild  Dnies numbness/tinigling or weakness  Objective:     Vitals:   Temp (24hrs), Av 1 °F (36 7 °C), Min:97 8 °F (36 6 °C), Max:98 6 °F (37 °C)    Temp:  [97 8 °F (36 6 °C)-98 6 °F (37 °C)] 98 2 °F (36 8 °C)  HR:  [61-70] 65  Resp:  [13-19] 18  BP: (110-160)/(50-86) 110/50  SpO2:  [93 %-98 %] 95 %  Body mass index is 32 78 kg/m²  Input and Output Summary (last 24 hours): Intake/Output Summary (Last 24 hours) at 2022 1028  Last data filed at 2022 0817  Gross per 24 hour   Intake 1750 ml   Output 1385 ml   Net 365 ml       Physical Exam:   Physical Exam  Vitals and nursing note reviewed  Constitutional:       General: He is not in acute distress  Comments: Sitting up in chair, cervical collar in place   Cardiovascular:      Rate and Rhythm: Normal rate and regular rhythm  Heart sounds: Murmur heard  Pulmonary:      Effort: Pulmonary effort is normal  No respiratory distress  Abdominal:      General: Abdomen is flat  Musculoskeletal:      Right lower leg: No edema  Left lower leg: No edema  Skin:     General: Skin is warm and dry  Coloration: Skin is not pale  Findings: No erythema  Comments: 2 posterior cervical MARCIN drains noted with serosanguinous output  Anterior cervical surgical incision c/d/i   Neurological:      Mental Status: He is alert and oriented to person, place, and time  Mental status is at baseline  Additional Data:     Labs:  Results from last 7 days   Lab Units 03/31/22  1618   WBC Thousand/uL 6 86   HEMOGLOBIN g/dL 10 4*   HEMATOCRIT % 32 0*   PLATELETS Thousands/uL 365   NEUTROS PCT % 70   LYMPHS PCT % 19   MONOS PCT % 6   EOS PCT % 4     Results from last 7 days   Lab Units 03/31/22  1618   SODIUM mmol/L 134*   POTASSIUM mmol/L 4 0   CHLORIDE mmol/L 98*   CO2 mmol/L 31   BUN mg/dL 13   CREATININE mg/dL 0 64   ANION GAP mmol/L 5   CALCIUM mg/dL 9 8   GLUCOSE RANDOM mg/dL 101     Results from last 7 days   Lab Units 04/01/22  1148   INR  2 26*     Results from last 7 days   Lab Units 04/01/22  1707   POC GLUCOSE mg/dl 126               Lines/Drains:  Invasive Devices  Report    Peripheral Intravenous Line            Peripheral IV 03/31/22 Right Forearm 1 day    Peripheral IV 04/01/22 Left Forearm <1 day          Drain            Closed/Suction Drain Posterior; Left Neck Bulb 7 Fr  <1 day    Closed/Suction Drain Posterior;Right Neck Bulb 7 Fr  <1 day                      Imaging: No pertinent imaging reviewed  Recent Cultures (last 7 days):   Results from last 7 days   Lab Units 04/01/22  1525 03/31/22  1836 03/31/22  1619   BLOOD CULTURE   --   --  No Growth at 24 hrs  No Growth at 24 hrs  GRAM STAIN RESULT  3+ Disintegrating polys*  1+ Gram positive cocci in pairs* 2+ Polys*  2+ Gram positive cocci in clusters*  --    WOUND CULTURE   --  2+ Growth of Staphylococcus aureus*  --        Last 24 Hours Medication List:   Current Facility-Administered Medications   Medication Dose Route Frequency Provider Last Rate    acetaminophen  975 mg Oral TID PRN Alison Biundo, PA-C      amLODIPine  5 mg Oral Daily Alison Biundo, PA-C      cefepime  2,000 mg Intravenous Q12H Camila Lo MD      docusate sodium  100 mg Oral BID Alison Biundo, FABIOLA      DULoxetine  60 mg Oral Daily Alison Biundo, PA-C      flecainide  100 mg Oral BID Alison Biundo, PA-C      gabapentin  100 mg Oral Daily Alison Biundo, PA-C      gabapentin  300 mg Oral HS Alison Biundo, PA-C      heparin (porcine)  3-20 Units/kg/hr (Order-Specific) Intravenous Titrated DEEPTHI Sanders      metoprolol succinate  100 mg Oral Daily Alison Biundo, FABIOLA      ondansetron  4 mg Intravenous Q6H PRN Alison Biundo, PA-ALTAGRACIA      oxyCODONE  2 5 mg Oral Q4H PRN Alison Biundo, PA-ALTAGRACIA      oxyCODONE  5 mg Oral Q4H PRN Alison Biundo, PA-C      potassium chloride  40 mEq Oral Daily Alison Biundo, PA-C      pravastatin  40 mg Oral Daily Alison Biundo, PA-ALTAGRACIA      senna  8 6 mg Oral Daily Alison Biundo, PAYOLETTE      sodium chloride  125 mL/hr Intravenous Continuous Alisonroxanne Faria PA-C 125 mL/hr (04/01/22 0331)    sodium chloride  75 mL/hr Intravenous Continuous Yuliya Armijo PA-C 75 mL/hr (04/01/22 1945)    tamsulosin  0 4 mg Oral Daily With FABIOLA Pradhan      vancomycin  1,750 mg Intravenous Q12H DEEPTHI Sanders 1,750 mg (04/02/22 0048)        Today, Patient Was Seen By: Renetta Sy PA-C    **Please Note: This note may have been constructed using a voice recognition system  **

## 2022-04-02 NOTE — CASE MANAGEMENT
Case Management Discharge Planning Note    Patient name Bg Thomas  Location [facility] 608/[facility] 658-40 MRN [id number]  : 1950 Date 2022       Current Admission Date: 3/31/2022  Current Admission Diagnosis:Surgical site infection   Patient Active Problem List    Diagnosis Date Noted    Surgical site infection 2022    Status post cervical spinal fusion 2022    Hypokalemia 2022    At risk for venous thromboembolism (VTE) 2022    Anemia 2022    Head pain cephalgia 2022    Hyponatremia 2022    Urinary dysfunction 2022    Muscle spasm 2022    Goals of care, counseling/discussion 2022    Sinus bradycardia 03/10/2022    Cervical myelopathy ([facility] ) 2022    Ambulatory dysfunction 2022    Weakness 2022    Pes anserinus bursitis of right knee 2021    IFG (impaired fasting glucose) 2021    Memory difficulty 03/10/2021    History of DVT of lower extremity 2021    Mixed hyperlipidemia 10/27/2020    Paroxysmal A-fib ([facility] ) 2020    Lower extremity edema 2020    Primary osteoarthritis of left knee 2020    Primary osteoarthritis of right knee 2020    Depression, major, single episode, moderate ([facility] ) 10/30/2017    Insomnia 03/10/2017    Lumbar herniated disc 03/10/2017    Erectile dysfunction 2016    Status post catheter ablation of atrial flutter 2015    Essential hypertension 2015    WILL (obstructive sleep apnea) 2015    Factor V Leiden mutation ([facility] ) 2014      LOS (days): 2  Geometric Mean LOS (GMLOS) (days): 3 50  Days to GMLOS:1 4     OBJECTIVE:  Risk of Unplanned Readmission Score: 23         Current admission status: Inpatient   Preferred Pharmacy:   [address], [address]  [facility] [facility] [place] [address]  Phone: [phone] Fax: [phone]    [facility]/pharmacy #[id number] - [place], [address] [place] [address]  Phone: [phone] Fax: [phone]    Primary Care Provider: Janee Barrera MD    Primary Insurance: Ruben Delgado [facility] REP  Secondary Insurance:     DISCHARGE DETAILS:    Additional Comments: Per communication w/ [facility] Neurosurgery pt will likely need home iv abx upon d/c  CM will await final determination and script for same in order to initiate coordination of DCP  Pt open with -C prior to admission and is intrested in [facility] referral  CM will follow

## 2022-04-02 NOTE — ASSESSMENT & PLAN NOTE
· On coumadin outpatient   · INR 2 26 on last check yesterday, today's labs pending  · Plan to start heparin gtt today per neurosurgery

## 2022-04-02 NOTE — PROGRESS NOTES
Progress Note - Infectious Disease   Rajeev Duenas 71 y o  male MRN: [id number]  Unit/Bed#: [facility] 608-01 Encounter: [id number]      Impression:  1  Postoperative MRSA cervical wound infection R/0 vertebral osteomyelitis S/P posterior cervical a VAC UA shin of postoperative collection and debridement with placement of drains C3-T1 POD 1  2  S/P  anterior cervical diskectomy and fixation fusion C5-6 and C6-7 posterior cervical decompression and instrumented fusion C3-T1 on 22  3  History of PAF on Coumadin  4  Factor 5 Leiden mutation  5  WILL    Recommendations:  Afebrile with normal WBC count  Operative description from  noted  1  So far cultures are showing MRSA  2  Continue vancomycin IV with further adjustment of dose by pharmacy  Will discontinue cefepime  3  Would anticipate at least a four-week IV vancomycin course followed by approximately 4 weeks of p o  Rx   4  PICC line in place  5  Discussed with patient and his wife who is at bedside     Antibiotics:  1  Vancomycin 1750 mg q 12 hours IV, day 3 Rx    Subjective: The patient has no complaints  His pain is well controlled  Denies fevers, chills, or sweats  Denies nausea, vomiting, or diarrhea  Objective:  Vitals:  Temp:  [97 8 °F (36 6 °C)-98 4 °F (36 9 °C)] 98 4 °F (36 9 °C)  HR:  [61-70] 64  Resp:  [16-18] 18  BP: (110-160)/(50-86) 111/51  SpO2:  [94 %-97 %] 94 %  Temp (24hrs), Av 1 °F (36 7 °C), Min:97 8 °F (36 6 °C), Max:98 4 °F (36 9 °C)  Current: Temperature: 98 4 °F (36 9 °C)    Physical Exam:     General Appearance:  Alert, nontoxic, no acute distress  Vista collar in place  Posterior cervical wound with 2 MARCIN drains   Throat: Oropharynx moist without lesions    Lips, mucosa, and tongue normal   Neck: Vista collar with posterior cervical wound and 2 MARCIN drains with serosanguineous drainage   Lungs:   Clear to auscultation bilaterally, no audible wheezes, rhonchi or rales; respirations unlabored   Heart:  Regular rate and rhythm, S1, S2 normal, no murmur, rub or gallop   Abdomen:   Soft, non-tender, surgical scars non-distended, positive bowel sounds  No masses, no organomegaly    No CVA tenderness   Extremities: Extremities normal, atraumatic, no clubbing, cyanosis or edema   Skin: As above, surgical scar  Invasive Devices  Report    Peripherally Inserted Central Catheter Line            PICC Line 63/84/34 Right Basilic <1 day          Peripheral Intravenous Line            Peripheral IV 03/31/22 Right Forearm 2 days          Drain            Closed/Suction Drain Posterior; Left Neck Bulb 7 Fr  1 day    Closed/Suction Drain Posterior;Right Neck Bulb 7 Fr  1 day                Labs, Imaging, & Other studies:   All pertinent labs were personally reviewed  Results from last 7 days   Lab Units 04/02/22  1041 03/31/22  1618 03/29/22  0606   WBC Thousand/uL 8 12 6 86 8 31   HEMOGLOBIN g/dL 9 8* 10 4* 9 4*   PLATELETS Thousands/uL 347 365 291     Results from last 7 days   Lab Units 04/02/22  1041 03/31/22  1618 03/31/22  1618 03/29/22  0606 03/29/22  0606   SODIUM mmol/L 137  --  134*  --  134*   POTASSIUM mmol/L 4 0   < > 4 0   < > 3 2*   CHLORIDE mmol/L 103   < > 98*   < > 99*   CO2 mmol/L 29   < > 31   < > 31   BUN mg/dL 15   < > 13   < > 11   CREATININE mg/dL 0 88   < > 0 64   < > 0 53*   EGFR ml/min/1 73sq m 86   < > 98   < > 106   CALCIUM mg/dL 10 3*   < > 9 8   < > 9 0    < > = values in this interval not displayed  Results from last 7 days   Lab Units 04/01/22  1525 03/31/22  1836 03/31/22  1619   BLOOD CULTURE   --   --  No Growth at 24 hrs  No Growth at 24 hrs     GRAM STAIN RESULT  3+ Disintegrating polys*  1+ Gram positive cocci in pairs* 2+ Polys*  2+ Gram positive cocci in clusters*  --    WOUND CULTURE   --  2+ Growth of Methicillin Resistant Staphylococcus aureus*  --

## 2022-04-02 NOTE — PROCEDURES
Insert PICC line    Date/Time: 4/2/2022 11:22 AM  Performed by: Kate Renee RN  Authorized by: DEEPTHI Cee     Patient location:  Bedside  Other Assisting Provider: Yes (comment) (Leona Morgan)    Consent:     Consent obtained:  Written    Consent given by:  Patient    Procedural risks discussed: consent per md     Alternatives discussed: consent permd   Universal protocol:     Procedure explained and questions answered to patient or proxy's satisfaction: yes      Relevant documents present and verified: yes      Test results available and properly labeled: yes      Radiology Images displayed and confirmed  If images not available, report reviewed: yes      Required blood products, implants, devices, and special equipment available: yes      Site/side marked: yes      Immediately prior to procedure, a time out was called: yes      Patient identity confirmed:  Verbally with patient and arm band  Pre-procedure details:     Hand hygiene: Hand hygiene performed prior to insertion      Sterile barrier technique: All elements of maximal sterile technique followed      Skin preparation:  ChloraPrep    Skin preparation agent: Skin preparation agent completely dried prior to procedure    Indications:     PICC line indications: long term antibiotics    Anesthesia (see MAR for exact dosages):      Anesthesia method:  Local infiltration    Local anesthetic:  Lidocaine 1% w/o epi  Procedure details:     Location:  Basilic    Vessel type: vein      Laterality:  Right    Approach: percutaneous technique used      Patient position:  Flat    Procedural supplies:  Double lumen    Catheter size:  5 Fr    Landmarks identified: yes      Ultrasound guidance: yes      Sterile ultrasound techniques: Sterile gel and sterile probe covers were used      Number of attempts:  1    Successful placement: yes      Vessel of catheter tip end:  Sherlock 3CG confirmed    Total catheter length (cm):  47    Catheter out on skin (cm):  0 Max flow rate:  999ml/hr    Arm circumference:  32  Post-procedure details:     Post-procedure:  Dressing applied and securement device placed    Assessment:  Blood return through all ports and free fluid flow    Post-procedure complications: none      Patient tolerance of procedure:   Tolerated well, no immediate complications  Comments:      Blood cultures not final, Per jodie Sin to place PICC today

## 2022-04-02 NOTE — PLAN OF CARE
Problem: INFECTION - ADULT  Goal: Absence or prevention of progression during hospitalization  Description: INTERVENTIONS:  - Assess and monitor for signs and symptoms of infection  - Monitor lab/diagnostic results  - Monitor all insertion sites, i e  indwelling lines, tubes, and drains  - Monitor endotracheal if appropriate and nasal secretions for changes in amount and color  - Bloomer appropriate cooling/warming therapies per order  - Administer medications as ordered  - Instruct and encourage patient and family to use good hand hygiene technique  - Identify and instruct in appropriate isolation precautions for identified infection/condition  Outcome: Progressing

## 2022-04-02 NOTE — PHYSICAL THERAPY NOTE
PHYSICAL THERAPY EVALUATION          Patient Name: aRjeev Duenas  GWLTG'B Date: 4/2/2022 04/02/22 0842   PT Last Visit   PT Visit Date 04/02/22   Note Type   Note type Evaluation   Pain Assessment   Pain Assessment Tool 0-10   Pain Score No Pain   Hospital Pain Intervention(s) Repositioned; Ambulation/increased activity   Restrictions/Precautions   Weight Bearing Precautions Per Order No   Braces or Orthoses C/S Collar   Other Precautions Chair Alarm; Bed Alarm; Fall Risk;Spinal precautions;Multiple lines  (MARCIN Drain)   Home Living   [facility] to enter with rails  (5 MICHELLE, 3 SH)   Bathroom Shower/Tub Tub/shower unit   Bathroom Toilet Standard   Bathroom Equipment Commode   Home Equipment Walker;Cane   Additional Comments no use of DME prior   Prior Function   Level of [place] Independent with ADLs and functional mobility   Lives With [place] Help From Family   ADL Assistance Independent   IADLs Independent   Falls in the last 6 months 0   Vocational Retired   Comments spouse able to help if need along with children   General   Family/Caregiver Present No   Cognition   Overall Cognitive Status WFL   Arousal/Participation Alert   Orientation Level Oriented X4   Memory Within functional limits   Following Commands Follows all commands and directions without difficulty   Subjective   Subjective pt in bed upons tarting PT, agreeable to tx   RUE Assessment   RUE Assessment WFL   LUE Assessment   LUE Assessment WFL   RLE Assessment   RLE Assessment WFL   Strength RLE   RLE Overall Strength 4/5   LLE Assessment   LLE Assessment WFL   Strength LLE   LLE Overall Strength 4/5   Bed Mobility   Supine to Sit 5  Supervision   Additional items Increased time required   Transfers   Sit to Stand 5  Supervision   Stand to Sit 5  Supervision   Ambulation/Elevation   Gait pattern WNL   Gait Assistance 5 Supervision   Assistive Device Rolling walker   Distance 50'x2   Stair Management Assistance 5  Supervision   Stair Management Technique Step to pattern; One rail R;One rail L   Number of Stairs 3   Balance   Static Sitting Fair +   Static Standing Fair +   Ambulatory Fair   Endurance Deficit   Endurance Deficit Yes   Endurance Deficit Description limited due to fatigue   Activity Tolerance   Activity Tolerance Patient limited by fatigue   Medical Staff Made Aware Jaquelin Kapoor DPT   Nurse Made Aware RN cleared tx   Assessment   Prognosis Good   Problem List Decreased endurance;Decreased strength; Impaired balance   Assessment Pt presented to B on 4/2/22 for initial PT eval  PT reported to [facility] ED on 3/31/22 due to suspicion of surgical site infection by neurosurg during an outpatient follow up visit  The pts primary diagnosis is cervical myelopathy  Pts PMH reveals s/p ACDF C5-7 & PCDF C3-T1 on 3/11/22, arthritis, cellulitis, [place], and AFib  Pt is considered to be high complexity due to Ongoing medical management for primary dx, Increased reliance on more restrictive AD compared to baseline, Decreased activity tolerance compared to baseline, Fall risk, Increased assistance needed from caregiver at current time, Spinal precautions at current time, Continuous pulse oximetry monitoring , MARCIN drain in place at current time, s/p surgical intervention  Pt presented to initial PT eval with the following limitations; decreased strength, decreased balance, decreased endurance and increased reliance on a restrictive device  The pt needed S with RW for amb and stair negotiation  Pt was left OOB with all needs within reach and no further questions or concerns  Pt can be d/c from IPPT due to being at S level and having family assist with needed  PT recommends d/c to home with OPPT when appropriate     Barriers to Discharge None   Goals   Patient Goals to go home   Plan   PT Frequency Other (Comment)  (d/c IPPT)   Recommendation PT Discharge Recommendation Home with outpatient rehabilitation  (when appropriate)   Equipment Recommended [address] [facility] Recommended Wheeled walker   Change/add to [employer]?  No   Additional Comments pt had no questions or concerns regarding d/c   AM-PAC Basic Mobility Inpatient   Turning in Bed Without Bedrails 4   Lying on Back to Sitting on Edge of Flat Bed 4   Moving Bed to Chair 4   Standing Up From Chair 4   Walk in Room 3   Climb 3-5 Stairs 3   Basic Mobility Inpatient Raw Score 22   Basic Mobility Standardized Score 47 4   Highest Level Of Mobility   [facility] Goal 7: Walk 25 feet or more   Modified Larue Scale   Modified Larue Scale 3   Barthel Index   Feeding 10   Bathing 5   Grooming Score 5   Dressing Score 5   Bladder Score 10   Bowels Score 10   Toilet Use Score 10   Transfers (Bed/Chair) Score 15   Mobility (Level Surface) Score 15   Stairs Score 10   Barthel Index Score 95   Samantha Tobias, spt

## 2022-04-02 NOTE — OCCUPATIONAL THERAPY NOTE
Occupational Therapy Evaluation     Patient Name: Jef Mitchell  SQBDY'D Date: 4/2/2022  Problem List  Principal Problem:    Surgical site infection  Active Problems:    Essential hypertension    WILL (obstructive sleep apnea)    Factor V Leiden mutation (HCC)    Paroxysmal A-fib (HCC)    Mixed hyperlipidemia    Cervical myelopathy (HCC)    At risk for venous thromboembolism (VTE)    Past Medical History  Past Medical History:   Diagnosis Date    Acute venous embolism and thrombosis of deep vessels of proximal lower extremity (HCC)     Last assessed: 5/18/15    Arthritis     Cellulitis     LE    CPAP (continuous positive airway pressure) dependence     Factor V Leiden ([address] )     Forgetfulness     [place] (hard of hearing)     Paroxysmal atrial fibrillation (HCC)     Last assessed: 11/2/15    Sleep apnea      Past Surgical History  Past Surgical History:   Procedure Laterality Date    BACK SURGERY      Lower    COLON SURGERY      COLONOSCOPY      MO ARTHRODESIS ANT INTERBODY MIN DISCECTOMY, CERVICAL BELOW C2 N/A 3/11/2022    Procedure: Anterior cervical discectomy and fixation fusion C5/6 and C6/7; Posterior cervical decompression and instrumented fusion C3-T1; Surgeon: Trisha Faria MD;  [facility];  Service: Neurosurgery         04/02/22 1100   OT Last Visit   OT Visit Date 04/02/22   Note Type   Note type Evaluation   Restrictions/Precautions   Weight Bearing Precautions Per Order No   Braces or Orthoses C/S Collar   Other Precautions Multiple lines; Fall Risk;Spinal precautions  (x2 MARCIN drain )   Pain Assessment   Pain Assessment Tool 0-10   Pain Score No Pain   Home Living   Type of Home House   Home Layout Multi-level; Able to live on main level with bedroom/bathroom  (FFSU with shower and recliner, 4STE)   Bathroom Shower/Tub Walk-in shower  ([facility] has tub/shower)   [address] [place] [address] Dr lockhart   Home Equipment Walker;Cane  (did not use PTA)   Prior Function   Level of [place] Independent with ADLs and functional mobility   Lives With Spouse   Receives Help From Family   ADL Assistance Independent   IADLs Independent   Falls in the last 6 months 0   Vocational Retired   Comments Pt recently d/c from [address] Pt reports being I with ADLs/IADLs, fnxl mobility, (+)    Reciprocal Relationships Wife, children   Service to Others Retired   [facility]    Psychosocial   Psychosocial (WDL) WDL   ADL   Where Assessed Edge of bed   Eating Assistance 7  Independent   Grooming Assistance 7  Independent   UB Bathing Assistance 5  Supervision/Setup   LB [address] 5  Supervision/Setup   UB Dressing Assistance 5  Supervision/Setup   LB Dressing Assistance [address]  [address]  5  Supervision/Setup   Bed Mobility   Supine to Sit 5  Supervision   Additional items Increased time required;HOB elevated   Sit to Supine Unable to assess   Transfers   Sit to Stand 5  Supervision   Additional items Increased time required   Stand to Sit 5  Supervision   Additional items Increased time required   Additional Comments Transfers with RW    Functional Mobility   Functional Mobility 5  Supervision   Additional items Rolling walker   Balance   Static Sitting Fair +   Dynamic Sitting Fair   Static Standing Fair   Dynamic Standing Fair   Ambulatory Fair   Activity Tolerance   Activity Tolerance Patient tolerated treatment well   Medical Staff Made Aware PT   Nurse Made Aware RN clearance for session    RUE Assessment   RUE Assessment WFL   LUE Assessment   LUE Assessment WFL   Hand Function   Gross Motor Coordination Functional   Fine Motor Coordination Functional   Vision - Complex Assessment   Ocular Range of Motion WFL   Head Position WDL   Tracking Able to track stimulus in all quads without difficulty   Cognition   Overall Cognitive Status WFL   Arousal/Participation Responsive; Cooperative Attention Attends with cues to redirect   Orientation Level Oriented X4   Memory Within functional limits   Following Commands Follows all commands and directions without difficulty   Comments Pt pleasant and cooperative t/o session, cues to redirect at times    Assessment   Assessment Pt is a 71 y o  male admitted to [facility] on 3/31/2022 w/ Surgical site infection  S/p "post-op collection and debridement" with drain placement  Pt recently d/c from [facility] post "Anterior cervical discectomy and fixation fusion C5/6 and C6/7; Posterior cervical decompression and instrumented fusion C3-T1 "  has a past medical history of Acute venous embolism and thrombosis of deep vessels of proximal lower extremity, Arthritis, Cellulitis, Factor V Leiden, [place], Paroxysmal atrial fibrillation, and Sleep apnea  Pt with active OT orders and up as tolerated orders  Pt seen as a co-evaluation with PT due to the patient's co-morbidities, clinically unstable presentation/clinical complexity, and present impairments  As per pt report, pta, resides with his wife in a 3STH, 4STE  Pt was I w/  ADLS and IADLS, (+) drove  Upon evaluation, pt currently requires S with RW for transfers and mobility  Pt currently requires I eating, I grooming, S UB ADLs, S LB ADLs, and S toileting  Pt with supportive family who can assist as needed upon d/c  From OT standpoint, recommendation would be home with increased social support  No further acute OT needs  D/C OT  Please re-consult if needed  Thank you  Pt was left after session with all current needs met  The patient's raw score on the AM-PAC Daily Activity inpatient short form is 20, standardized score is 42 03, greater than 39 4  Patients at this level are likely to benefit from discharge to home  Please refer to the recommendation of the Occupational Therapist for safe discharge planning     Plan   OT Frequency Eval only   Recommendation   OT Discharge Recommendation No rehabilitation needs  (home with social support)   OT - OK to Discharge Yes  (when medically stable )   AM-PAC Daily Activity Inpatient   Lower Body Dressing 3   Bathing 3   Toileting 3   Upper Body Dressing 3   Grooming 4   Eating 4   Daily Activity Raw Score 20   Daily Activity Standardized Score (Calc for Raw Score >=11) 42 03   AM-PAC Applied Cognition Inpatient   Following a Speech/Presentation 4   Understanding Ordinary Conversation 4   Taking Medications 4   Remembering Where Things Are Placed or Put Away 4   Remembering List of 4-5 Errands 4   Taking Care of Complicated Tasks 4   Applied Cognition Raw Score 24   Applied Cognition Standardized Score 62 21        Theresa Poon MS, OTR/L

## 2022-04-03 LAB
ANION GAP SERPL CALCULATED.3IONS-SCNC: 3 MMOL/L (ref 4–13)
APTT PPP: 41 SECONDS (ref 23–37)
APTT PPP: 75 SECONDS (ref 23–37)
APTT PPP: 86 SECONDS (ref 23–37)
APTT PPP: 87 SECONDS (ref 23–37)
BACTERIA SPEC ANAEROBE CULT: NORMAL
BACTERIA TISS AEROBE CULT: ABNORMAL
BASOPHILS # BLD AUTO: 0.02 THOUSANDS/ΜL (ref 0–0.1)
BASOPHILS NFR BLD AUTO: 0 % (ref 0–1)
BUN SERPL-MCNC: 17 MG/DL (ref 5–25)
CALCIUM SERPL-MCNC: 8.4 MG/DL (ref 8.3–10.1)
CHLORIDE SERPL-SCNC: 106 MMOL/L (ref 100–108)
CO2 SERPL-SCNC: 29 MMOL/L (ref 21–32)
CREAT SERPL-MCNC: 0.72 MG/DL (ref 0.6–1.3)
EOSINOPHIL # BLD AUTO: 0.18 THOUSAND/ΜL (ref 0–0.61)
EOSINOPHIL NFR BLD AUTO: 3 % (ref 0–6)
ERYTHROCYTE [DISTWIDTH] IN BLOOD BY AUTOMATED COUNT: 13.1 % (ref 11.6–15.1)
GFR SERPL CREATININE-BSD FRML MDRD: 93 ML/MIN/1.73SQ M
GLUCOSE SERPL-MCNC: 122 MG/DL (ref 65–140)
GRAM STN SPEC: ABNORMAL
GRAM STN SPEC: ABNORMAL
HCT VFR BLD AUTO: 24.8 % (ref 36.5–49.3)
HGB BLD-MCNC: 7.9 G/DL (ref 12–17)
IMM GRANULOCYTES # BLD AUTO: 0.13 THOUSAND/UL (ref 0–0.2)
IMM GRANULOCYTES NFR BLD AUTO: 2 % (ref 0–2)
LYMPHOCYTES # BLD AUTO: 1.56 THOUSANDS/ΜL (ref 0.6–4.47)
LYMPHOCYTES NFR BLD AUTO: 23 % (ref 14–44)
MCH RBC QN AUTO: 30 PG (ref 26.8–34.3)
MCHC RBC AUTO-ENTMCNC: 31.9 G/DL (ref 31.4–37.4)
MCV RBC AUTO: 94 FL (ref 82–98)
MONOCYTES # BLD AUTO: 0.45 THOUSAND/ΜL (ref 0.17–1.22)
MONOCYTES NFR BLD AUTO: 7 % (ref 4–12)
NEUTROPHILS # BLD AUTO: 4.39 THOUSANDS/ΜL (ref 1.85–7.62)
NEUTS SEG NFR BLD AUTO: 65 % (ref 43–75)
NRBC BLD AUTO-RTO: 0 /100 WBCS
PLATELET # BLD AUTO: 308 THOUSANDS/UL (ref 149–390)
PMV BLD AUTO: 8.7 FL (ref 8.9–12.7)
POTASSIUM SERPL-SCNC: 4 MMOL/L (ref 3.5–5.3)
RBC # BLD AUTO: 2.63 MILLION/UL (ref 3.88–5.62)
SODIUM SERPL-SCNC: 138 MMOL/L (ref 136–145)
WBC # BLD AUTO: 6.73 THOUSAND/UL (ref 4.31–10.16)

## 2022-04-03 PROCEDURE — 80048 BASIC METABOLIC PNL TOTAL CA: CPT | Performed by: NURSE PRACTITIONER

## 2022-04-03 PROCEDURE — 85730 THROMBOPLASTIN TIME PARTIAL: CPT | Performed by: NEUROLOGICAL SURGERY

## 2022-04-03 PROCEDURE — 99232 SBSQ HOSP IP/OBS MODERATE 35: CPT | Performed by: INTERNAL MEDICINE

## 2022-04-03 PROCEDURE — 99024 POSTOP FOLLOW-UP VISIT: CPT | Performed by: NURSE PRACTITIONER

## 2022-04-03 PROCEDURE — 85025 COMPLETE CBC W/AUTO DIFF WBC: CPT | Performed by: NURSE PRACTITIONER

## 2022-04-03 PROCEDURE — 85730 THROMBOPLASTIN TIME PARTIAL: CPT | Performed by: NURSE PRACTITIONER

## 2022-04-03 RX ADMIN — METHOCARBAMOL TABLETS 750 MG: 750 TABLET, COATED ORAL at 22:47

## 2022-04-03 RX ADMIN — TAMSULOSIN HYDROCHLORIDE 0.4 MG: 0.4 CAPSULE ORAL at 17:12

## 2022-04-03 RX ADMIN — POTASSIUM CHLORIDE 40 MEQ: 20 TABLET, EXTENDED RELEASE ORAL at 08:48

## 2022-04-03 RX ADMIN — VANCOMYCIN HYDROCHLORIDE 1750 MG: 5 INJECTION, POWDER, LYOPHILIZED, FOR SOLUTION INTRAVENOUS at 22:47

## 2022-04-03 RX ADMIN — GABAPENTIN 300 MG: 300 CAPSULE ORAL at 21:58

## 2022-04-03 RX ADMIN — OXYCODONE HYDROCHLORIDE 5 MG: 5 TABLET ORAL at 21:57

## 2022-04-03 RX ADMIN — DULOXETINE HYDROCHLORIDE 60 MG: 60 CAPSULE, DELAYED RELEASE ORAL at 08:47

## 2022-04-03 RX ADMIN — FLECAINIDE ACETATE 100 MG: 100 TABLET ORAL at 08:48

## 2022-04-03 RX ADMIN — METHOCARBAMOL TABLETS 750 MG: 750 TABLET, COATED ORAL at 17:12

## 2022-04-03 RX ADMIN — GABAPENTIN 100 MG: 100 CAPSULE ORAL at 08:48

## 2022-04-03 RX ADMIN — VANCOMYCIN HYDROCHLORIDE 1750 MG: 5 INJECTION, POWDER, LYOPHILIZED, FOR SOLUTION INTRAVENOUS at 10:47

## 2022-04-03 RX ADMIN — PRAVASTATIN SODIUM 40 MG: 40 TABLET ORAL at 08:47

## 2022-04-03 RX ADMIN — SENNOSIDES 8.6 MG: 8.6 TABLET, FILM COATED ORAL at 08:47

## 2022-04-03 RX ADMIN — FLECAINIDE ACETATE 100 MG: 100 TABLET ORAL at 17:12

## 2022-04-03 RX ADMIN — DOCUSATE SODIUM 100 MG: 100 CAPSULE, LIQUID FILLED ORAL at 08:47

## 2022-04-03 RX ADMIN — DOCUSATE SODIUM 100 MG: 100 CAPSULE, LIQUID FILLED ORAL at 17:12

## 2022-04-03 RX ADMIN — AMLODIPINE BESYLATE 5 MG: 5 TABLET ORAL at 08:47

## 2022-04-03 RX ADMIN — SODIUM CHLORIDE 75 ML/HR: 0.9 INJECTION, SOLUTION INTRAVENOUS at 20:30

## 2022-04-03 RX ADMIN — HEPARIN SODIUM 13.1 UNITS/KG/HR: 10000 INJECTION, SOLUTION INTRAVENOUS; SUBCUTANEOUS at 09:23

## 2022-04-03 RX ADMIN — OXYCODONE HYDROCHLORIDE 5 MG: 5 TABLET ORAL at 17:11

## 2022-04-03 RX ADMIN — METOPROLOL SUCCINATE 100 MG: 100 TABLET, EXTENDED RELEASE ORAL at 08:47

## 2022-04-03 NOTE — PLAN OF CARE
Problem: PAIN - ADULT  Goal: Verbalizes/displays adequate comfort level or baseline comfort level  Description: Interventions:  - Encourage patient to monitor pain and request assistance  - Assess pain using appropriate pain scale  - Administer analgesics based on type and severity of pain and evaluate response  - Implement non-pharmacological measures as appropriate and evaluate response  - Consider cultural and social influences on pain and pain management  - Notify physician/advanced practitioner if interventions unsuccessful or patient reports new pain  Outcome: Progressing     Problem: INFECTION - ADULT  Goal: Absence or prevention of progression during hospitalization  Description: INTERVENTIONS:  - Assess and monitor for signs and symptoms of infection  - Monitor lab/diagnostic results  - Monitor all insertion sites, i e  indwelling lines, tubes, and drains  - Monitor endotracheal if appropriate and nasal secretions for changes in amount and color  - [place] appropriate cooling/warming therapies per order  - Administer medications as ordered  - Instruct and encourage patient and family to use good hand hygiene technique  - Identify and instruct in appropriate isolation precautions for identified infection/condition  Outcome: Progressing  Goal: Absence of fever/infection during neutropenic period  Description: INTERVENTIONS:  - Monitor WBC    Outcome: Progressing     Problem: SAFETY ADULT  Goal: Patient will remain free of falls  Description: INTERVENTIONS:  - Educate patient/family on patient safety including physical limitations  - Instruct patient to call for assistance with activity   - Consult OT/PT to assist with strengthening/mobility   - Keep Call bell within reach  - Keep bed low and locked with side rails adjusted as appropriate  - Keep care items and personal belongings within reach  - Initiate and maintain comfort rounds  - Make Fall Risk Sign visible to staff  - Offer Toileting   - Obtain necessary fall risk management equipment  - Apply yellow socks and bracelet for high fall risk patients  - Consider moving patient to room near nurses station  Outcome: Progressing  Goal: Maintain or return to baseline ADL function  Description: INTERVENTIONS:  -  Assess patient's ability to carry out ADLs; assess patient's baseline for ADL function and identify physical deficits which impact ability to perform ADLs (bathing, care of mouth/teeth, toileting, grooming, dressing, etc )  - Assess/evaluate cause of self-care deficits   - Assess range of motion  - Assess patient's mobility; develop plan if impaired  - Assess patient's need for assistive devices and provide as appropriate  - Encourage maximum independence but intervene and supervise when necessary  - Involve family in performance of ADLs  - Assess for home care needs following discharge   - Consider OT consult to assist with ADL evaluation and planning for discharge  - Provide patient education as appropriate  Outcome: Progressing  Goal: Maintains/Returns to pre admission functional level  Description: INTERVENTIONS:  - Perform BMAT or MOVE assessment daily    - Set and communicate daily mobility goal to care team and patient/family/caregiver     - Collaborate with rehabilitation services on mobility goals if consulted  - Out of bed for toileting  - Record patient progress and toleration of activity level   Outcome: Progressing     Problem: DISCHARGE PLANNING  Goal: Discharge to home or other facility with appropriate resources  Description: INTERVENTIONS:  - Identify barriers to discharge w/patient and caregiver  - Arrange for needed discharge resources and transportation as appropriate  - Identify discharge learning needs (meds, wound care, etc )  - Arrange for interpretive services to assist at discharge as needed  - Refer to Case Management Department for coordinating discharge planning if the patient needs post-hospital services based on physician/advanced practitioner order or complex needs related to functional status, cognitive ability, or social support system  Outcome: Progressing     Problem: Knowledge Deficit  Goal: Patient/family/caregiver demonstrates understanding of disease process, treatment plan, medications, and discharge instructions  Description: Complete learning assessment and assess knowledge base    Interventions:  - Provide teaching at level of understanding  - Provide teaching via preferred learning methods  Outcome: Progressing     Problem: Potential for Falls  Goal: Patient will remain free of falls  Description: INTERVENTIONS:  - Educate patient/family on patient safety including physical limitations  - Instruct patient to call for assistance with activity   - Consult OT/PT to assist with strengthening/mobility   - Keep Call bell within reach  - Keep bed low and locked with side rails adjusted as appropriate  - Keep care items and personal belongings within reach  - Initiate and maintain comfort rounds  - Make Fall Risk Sign visible to staff  - Offer [address] necessary fall risk management equipment  - Apply yellow socks and bracelet for high fall risk patients  - Consider moving patient to room near nurses station  Outcome: Progressing     Problem: MOBILITY - ADULT  Goal: Maintain or return to baseline ADL function  Description: INTERVENTIONS:  -  Assess patient's ability to carry out ADLs; assess patient's baseline for ADL function and identify physical deficits which impact ability to perform ADLs (bathing, care of mouth/teeth, toileting, grooming, dressing, etc )  - Assess/evaluate cause of self-care deficits   - Assess range of motion  - Assess patient's mobility; develop plan if impaired  - Assess patient's need for assistive devices and provide as appropriate  - Encourage maximum independence but intervene and supervise when necessary  - Involve family in performance of ADLs  - Assess for home care needs following discharge   - Consider OT consult to assist with ADL evaluation and planning for discharge  - Provide patient education as appropriate  Outcome: Progressing  Goal: Maintains/Returns to pre admission functional level  Description: INTERVENTIONS:  - Perform BMAT or MOVE assessment daily    - Set and communicate daily mobility goal to care team and patient/family/caregiver     - Collaborate with rehabilitation services on mobility goals if consulted  - Out of bed for toileting  - Record patient progress and toleration of activity level   Outcome: Progressing     Problem: Prexisting or High Potential for Compromised Skin Integrity  Goal: Skin integrity is maintained or improved  Description: INTERVENTIONS:  - Identify patients at risk for skin breakdown  - Assess and monitor skin integrity  - Assess and monitor nutrition and hydration status  - Monitor labs   - Assess for incontinence   - Turn and reposition patient  - Assist with mobility/ambulation  - Relieve pressure over bony prominences  - Avoid friction and shearing  - Provide appropriate hygiene as needed including keeping skin clean and dry  - Evaluate need for skin moisturizer/barrier cream  - Collaborate with interdisciplinary team   - Patient/family teaching  - Consider wound care consult   Outcome: Progressing

## 2022-04-03 NOTE — QUICK NOTE
Patient was not personally seen or examined today  Chart thoroughly reviewed including vital signs, progress notes and laboratory studies  Case was discussed with Neurosurgery AP, case management and primary RN  Plan to continue monitoring on heparin gtt at this time, and bridge to coumadin once drains are removed  SLIM will formally re-evaluate tomorrow 4/4

## 2022-04-03 NOTE — ASSESSMENT & PLAN NOTE
POD 2 posterior cervical evacuation of postoperative collection and debridement with placement of drains C3-T1  · S/p Anterior cervical discectomy and fixation fusion C5/6 and C6/7; Posterior cervical decompression and instrumented fusion C3-T1 with Dr Fito Robison on 3/11/22    Imaging:   · CT Cervical w/ contrast 3/31/2022: Large posterior soft tissue fluid collection, morphology suspicious for infection as clinically suspected  Study is nondiagnostic for evaluation of the central canal and epidural abscess  MR is better suited intracanalicular evaluation although will be limited by artifact from metal hardware  Plan:   Continue to monitor neurological exam and symptoms   Blood Cx neg x 48 hours   Wound cx 3/31 + MRSA   Intraop cx tissue culture 4/1 + MRSA   [place] collar to remain in place at all times, okay for [place] collar for showering   2 MARCIN drains:  o Left 33 mL/24 hours  o Right 40 mL/24 hours  · SLIM and ID consulted - appreciate recommendations  · PICC placed  · Maintain vancomycin 1,750 mg q12h per ID  · Mobilize with PT/TO  · DVT ppx: SCDs, on heparin gtt    Plan of care discussed with MIREILLE Rodrigez  Neurosurgery will follow as primary, call with any further questions or concerns

## 2022-04-03 NOTE — ASSESSMENT & PLAN NOTE
· Appears to be postop anemia since recent surgery in March, hemoglobin between 9-10 since that time and previously within normal limits   · Hemoglobin down-trending since operative washout, no signs or symptoms of active bleeding   · Will check additional work-up with iron panel, folate and B12    · Continue to monitor and transfuse as needed

## 2022-04-03 NOTE — ASSESSMENT & PLAN NOTE
· On coumadin outpatient   · Heparin gtt started on 4/2 per neurosurgery, tentative plan to bridge to coumadin once drains are removed  · Monitor PT/INR

## 2022-04-03 NOTE — ASSESSMENT & PLAN NOTE
· Continue with home dose BB   · Neurosurgery has ordered heparin gtt to start 4/2   · Monitor PT/INR

## 2022-04-03 NOTE — PROGRESS NOTES
Progress Note - Infectious Disease   Juan Manuel Reyes 71 y o  male MRN: [id number]  Unit/Bed#: [facility] 608-01 Encounter: [id number]      Impression:  1  Postoperative MRSA cervical wound infection R/0 vertebral osteomyelitis S/P posterior cervical a VAC UA shin of postoperative collection and debridement with placement of drains C3-T1 POD 2  2  S/P  anterior cervical diskectomy and fixation fusion C5-6 and C6-7 posterior cervical decompression and instrumented fusion C3-T1 on 22  3  History of PAF on Coumadin  4  Factor 5 Leiden mutation  5  WILL    Recommendations:  Afebrile with normal WBC count  Hemoglobin is 7 9  Wound dressing has some serosanguineous drainage  1  Final cultures are showing MRSA  2  Continue vancomycin IV with further adjustment of dose by pharmacy  3  Would anticipate at least a four-week IV vancomycin course followed by approximately 4 weeks of p o  Rx   4  PICC line in place  5  Discussed with Neurosurgery CRNP    Antibiotics:  1  Vancomycin 1750 mg q 12 hours IV, day 3 Rx    Subjective:  He has some wound area discomfort  Denies fevers, chills, or sweats  Denies nausea, vomiting, or diarrhea  Objective:  Vitals:  Temp:  [97 5 °F (36 4 °C)-98 6 °F (37 °C)] 97 7 °F (36 5 °C)  HR:  [58-65] 63  Resp:  [16-18] 16  BP: (111-132)/(51-69) 128/65  SpO2:  [94 %-95 %] 95 %  Temp (24hrs), Av 9 °F (36 6 °C), Min:97 5 °F (36 4 °C), Max:98 6 °F (37 °C)  Current: Temperature: 97 7 °F (36 5 °C)    Physical Exam:     General Appearance:  Alert, nontoxic, no acute distress  [place] collar in place  Posterior cervical wound with 2 MARCIN drains   Throat: Oropharynx moist without lesions    Lips, mucosa, and tongue normal   Neck: [place] collar with posterior cervical wound and 2 MARCIN drains with serosanguineous drainage some serosanguineous drainage on dressing   Lungs:   Clear to auscultation bilaterally, no audible wheezes, rhonchi or rales; respirations unlabored   Heart:  Regular rate and rhythm, S1, S2 normal, no murmur, rub or gallop   Abdomen:   Soft, non-tender, surgical scars non-distended, positive bowel sounds  No masses, no organomegaly    No CVA tenderness   Extremities: Extremities normal, atraumatic, no clubbing, cyanosis or edema  PICC line right basilic   Skin: As above, surgical scar  Invasive Devices  Report    Peripherally Inserted Central Catheter Line            PICC Line 26/63/98 Right Basilic 1 day          Peripheral Intravenous Line            Peripheral IV 03/31/22 Right Forearm 3 days          Drain            Closed/Suction Drain Posterior; Left Neck Bulb 7 Fr  1 day    Closed/Suction Drain Posterior;Right Neck Bulb 7 Fr  1 day                Labs, Imaging, & Other studies:   All pertinent labs were personally reviewed  Results from last 7 days   Lab Units 04/03/22  0051 04/02/22  1041 03/31/22  1618   WBC Thousand/uL 6 73 8 12 6 86   HEMOGLOBIN g/dL 7 9* 9 8* 10 4*   PLATELETS Thousands/uL 308 347 365     Results from last 7 days   Lab Units 04/03/22  0051 04/02/22  1041 04/02/22  1041 03/31/22  1618 03/31/22  1618   SODIUM mmol/L 138  --  137  --  134*   POTASSIUM mmol/L 4 0   < > 4 0   < > 4 0   CHLORIDE mmol/L 106   < > 103   < > 98*   CO2 mmol/L 29   < > 29   < > 31   BUN mg/dL 17   < > 15   < > 13   CREATININE mg/dL 0 72   < > 0 88   < > 0 64   EGFR ml/min/1 73sq m 93   < > 86   < > 98   CALCIUM mg/dL 8 4   < > 10 3*   < > 9 8    < > = values in this interval not displayed  Results from last 7 days   Lab Units 04/01/22  1525 03/31/22  1836 03/31/22  1619   BLOOD CULTURE   --   --  No Growth at 48 hrs  No Growth at 48 hrs     GRAM STAIN RESULT  3+ Disintegrating polys*  1+ Gram positive cocci in pairs* 2+ Polys*  2+ Gram positive cocci in clusters*  --    WOUND CULTURE   --  2+ Growth of Methicillin Resistant Staphylococcus aureus*  --

## 2022-04-03 NOTE — ASSESSMENT & PLAN NOTE
History of factor five Leiden along with DVT/PE along with Afib       Last dose coumadin 3/31  On heparin gtt, therapeutic ptt 87 4/3

## 2022-04-03 NOTE — PROGRESS NOTES
Vancomycin IV Pharmacy-to-Dose Consultation    Concepcion Niño is a 71 y o  male who is currently receiving Vancomycin IV with management by the Pharmacy Consult service for the treatment of postoperative MRSA cervical wound infection, rule out vertebral osteomyelitis    Assessment/Plan:    The patient's chart was reviewed  Renal function is stable  There are no signs or symptoms of nephrotoxicity and/or infusion reactions documented  Based on todays assessment, will continue current vancomycin (Day # 4) dosing of 1750 mg IV q12h, with a plan for trough to be drawn at 1100 on 4/5 (maintenance trough)  Pharmacy will continue to follow closely for s/sx of nephrotoxicity, infusion reactions and appropriateness of therapy  BMP and CBC will be ordered per protocol  We will continue to follow the patients culture results and clinical progress daily        Mely Mckeon, PharmD, [address] Clinical Pharmacist  [phone]

## 2022-04-03 NOTE — ANESTHESIA PREPROCEDURE EVALUATION
Procedure:  Posterior cervical evacuation of postoperative collection and debridement with placement of drains C3-T1 (N/A Spine Lumbar)    Relevant Problems   CARDIO   (+) Essential hypertension   (+) Mixed hyperlipidemia   (+) Paroxysmal A-fib (HCC)   (+) Sinus bradycardia      HEMATOLOGY   (+) Anemia      MUSCULOSKELETAL   (+) Pes anserinus bursitis of right knee   (+) Primary osteoarthritis of left knee   (+) Primary osteoarthritis of right knee      NEURO/PSYCH   (+) Cervical myelopathy (HCC)   (+) Depression, major, single episode, moderate (HCC)   (+) Head pain cephalgia   (+) History of DVT of lower extremity      PULMONARY   (+) WILL (obstructive sleep apnea)        Physical Exam    Airway    Mallampati score: IV  TM Distance: >3 FB  Neck ROM: limited     Dental       Cardiovascular      Pulmonary      Other Findings  C-collar in place      Anesthesia Plan  ASA Score- 3     Anesthesia Type- general with ASA Monitors  Additional Monitors:   Airway Plan: ETT  Plan Factors-    Chart reviewed  EKG reviewed  Imaging results reviewed  Existing labs reviewed  Induction- intravenous  Postoperative Plan- Plan for postoperative opioid use  Planned trial extubation    Informed Consent- Anesthetic plan and risks discussed with patient  I personally reviewed this patient with the CRNA  Discussed and agreed on the Anesthesia Plan with the BYRON Munoz Anesthesia plan and consent discussed with Kiana Varela who expressed understanding and agreement  Risks/benefits and alternatives discussed with patient including possible PONV, sore throat, damage to teeth/lips/gums and possibility of rare anesthetic and surgical emergencies

## 2022-04-03 NOTE — PROGRESS NOTES
[address] [facility]  Progress Note - Yasmine Hughes 1950, 71 y o  male MRN: [id number]  Unit/Bed#: [facility] 645-37 Encounter: [id number]  Primary Care Provider: Neil Washington MD   Date and time admitted to hospital: 3/31/2022 10:58 AM    At risk for venous thromboembolism (VTE)  Assessment & Plan  History of factor five Leiden along with DVT/PE along with Afib  Last dose coumadin 3/31  On heparin gtt, therapeutic ptt 87 4/3      Cervical myelopathy (HCC)  Assessment & Plan  POD 2 posterior cervical evacuation of postoperative collection and debridement with placement of drains C3-T1  · S/p Anterior cervical discectomy and fixation fusion C5/6 and C6/7; Posterior cervical decompression and instrumented fusion C3-T1 with Dr Fabrice Drummond on 3/11/22    Imaging:   · CT Cervical w/ contrast 3/31/2022: Large posterior soft tissue fluid collection, morphology suspicious for infection as clinically suspected  Study is nondiagnostic for evaluation of the central canal and epidural abscess  MR is better suited intracanalicular evaluation although will be limited by artifact from metal hardware  Plan:   Continue to monitor neurological exam and symptoms   Blood Cx neg x 48 hours   Wound cx 3/31 + MRSA   Intraop cx tissue culture 4/1 + MRSA   [place] collar to remain in place at all times, okay for [place] collar for showering   2 MARCIN drains:  o Left 33 mL/24 hours  o Right 40 mL/24 hours  · SLIM and ID consulted - appreciate recommendations  · PICC placed  · Maintain vancomycin 1,750 mg q12h per ID  · Mobilize with PT/TO  · DVT ppx: SCDs, on heparin gtt    Plan of care discussed with MIREILLE Rodrigez  Neurosurgery will follow as primary, call with any further questions or concerns          Factor V Leiden mutation [facility])  Assessment & Plan  Therapeutic on heparin gtt  Plan to bridge to Coumadin once drains are discontinued      Subjective/Objective   Chief Complaint:  I am doing well     Subjective:  Reporting some incisional pain  Denies any new numbness or weakness  Denies any fevers  States he has been mobilizing well  Objective:  NAD  Two posterior cervical drain to place  Posterior cervical incision with retention sutures clean dry and intact  I/O       04/01 0701 04/02 0700 04/02 0701 04/03 0700 04/03 0701 04/04 0700    P  O  150 300     I V  (mL/kg) 1000 (9 3) 628 5 (5 9)     IV Piggyback 650 550     Total Intake(mL/kg) 1800 (16 8) 1478 5 (13 8)     Urine (mL/kg/hr) 1690 (0 7) 1200 (0 5) 300 (1 2)    Drains 95 73     Total Output 1785 1273 300    Net +15 +205 5 -300           Unmeasured Urine Occurrence 1 x 1 x           Invasive Devices  Report    Peripherally Inserted Central Catheter Line            PICC Line 70/21/36 Right Basilic <1 day          Peripheral Intravenous Line            Peripheral IV 03/31/22 Right Forearm 2 days          Drain            Closed/Suction Drain Posterior; Left Neck Bulb 7 Fr  1 day    Closed/Suction Drain Posterior;Right Neck Bulb 7 Fr  1 day                Physical Exam:  Vitals: Blood pressure 132/66, pulse 61, temperature 97 5 °F (36 4 °C), resp  rate 18, height 5' 11" (1 803 m), weight 107 kg (235 lb), SpO2 95 %  ,Body mass index is 32 78 kg/m²      General appearance: alert, appears stated age, cooperative and no distress  Head: Normocephalic, without obvious abnormality, atraumatic  Eyes: EOMI, PERRL  Neck:  [place] brace in place, posterior cervical incision clean dry and intact, 2 MARCIN drains  Back: no kyphosis present, no tenderness to percussion or palpation  Lungs: non labored breathing  Heart: regular heart rate  Neurologic:   Mental status: Alert, oriented x3, thought content appropriate  Cranial nerves: grossly intact (Cranial nerves II-XII)  Sensory: normal to light touch  Motor:  Baseline right upper upper extremity weakness  Reflexes: 2+ and symmetric, no Jiménez's or clonus      Lab Results:  Results from last 7 days   Lab Units 04/03/22  0051 04/02/22  1041 03/31/22  1618 03/29/22  0606 03/29/22  0606   WBC Thousand/uL 6 73 8 12 6 86   < > 8 31   HEMOGLOBIN g/dL 7 9* 9 8* 10 4*   < > 9 4*   HEMATOCRIT % 24 8* 29 9* 32 0*   < > 28 8*   PLATELETS Thousands/uL 308 347 365   < > 291   NEUTROS PCT % 65  --  70  --  82*   MONOS PCT % 7  --  6  --  6    < > = values in this interval not displayed  Results from last 7 days   Lab Units 04/03/22  0051 04/02/22  1041 03/31/22  1618   POTASSIUM mmol/L 4 0 4 0 4 0   CHLORIDE mmol/L 106 103 98*   CO2 mmol/L 29 29 31   BUN mg/dL 17 15 13   CREATININE mg/dL 0 72 0 88 0 64   CALCIUM mg/dL 8 4 10 3* 9 8     Results from last 7 days   Lab Units 03/29/22  0606   MAGNESIUM mg/dL 1 9         Results from last 7 days   Lab Units 04/03/22  0051 04/02/22  1801 04/02/22  1041 04/01/22  1148 04/01/22  1148 03/31/22  1618 03/31/22  1618   INR   --   --  1 44*  --  2 26*  --  2 37*   PTT seconds 87* 44* 36   < > 50*   < > 50*    < > = values in this interval not displayed  No results found for: TROPONINT  ABG:No results found for: PHART, XDJ8MXX, PO2ART, TSP3BJZ, Z5UDFOEM, BEART, SOURCE    Imaging Studies: I have personally reviewed pertinent reports  and I have personally reviewed pertinent films in PACS    CT spine cervical w contrast    Result Date: 3/31/2022  Impression: Large posterior soft tissue fluid collection, morphology suspicious for infection as clinically suspected  Study is nondiagnostic for evaluation of the central canal and epidural abscess  MR is better suited intracanalicular evaluation although will be limited by artifact from metal hardware  No specific findings of osteomyelitis  Workstation performed: [id number]       EKG, Pathology, and Other Studies: I have personally reviewed pertinent reports        VTE Pharmacologic Prophylaxis: Sequential compression device (Venodyne)  and Heparin    VTE Mechanical Prophylaxis: sequential compression device

## 2022-04-03 NOTE — ASSESSMENT & PLAN NOTE
History of cervical myelopathy, status post anterior cervical discectomy and fixation fusion C5/6 and C6/7; Posterior cervical decompression and instrumented fusion C3-T1 with Dr Maria Teresa Bentley on 3/11/22  Discharged to [facility] for rehab on 3/18, then discharged home on 3/29  Noted to have increased redness and drainage at incision site, no improvement after 6 days of Keflex  Referred to ED from OP office visit for further management  · CT cervical spine: Large posterior soft tissue fluid collection, morphology suspicious for infection as clinically suspected  Study is nondiagnostic for evaluation of the central canal and epidural abscess  No specific findings of osteomyelitis     · Admitted under neurosurgery service  · Status post operative washout on 4/1   · ID following and managing antibiotics  · Wound cultures and OR cultures are growing MRSA   · Currently on vancomycin, will need 4 weeks IV antibiotics   · Blood cultures with no growth to date   · Analgesics as needed   · PT/OT recommending outpatient rehabilitation

## 2022-04-04 ENCOUNTER — TELEPHONE (OUTPATIENT)
Dept: OBGYN CLINIC | Facility: HOSPITAL | Age: 72
End: 2022-04-04

## 2022-04-04 LAB
APTT PPP: 79 SECONDS (ref 23–37)
BACTERIA ISLT: ABNORMAL
ERYTHROCYTE [DISTWIDTH] IN BLOOD BY AUTOMATED COUNT: 13.2 % (ref 11.6–15.1)
FERRITIN SERPL-MCNC: 816 NG/ML (ref 8–388)
FOLATE SERPL-MCNC: 8.1 NG/ML (ref 3.1–17.5)
HCT VFR BLD AUTO: 25.3 % (ref 36.5–49.3)
HGB BLD-MCNC: 8.4 G/DL (ref 12–17)
INR PPP: 1.39 (ref 0.84–1.19)
IRON SATN MFR SERPL: 22 % (ref 20–50)
IRON SERPL-MCNC: 38 UG/DL (ref 65–175)
MCH RBC QN AUTO: 30.7 PG (ref 26.8–34.3)
MCHC RBC AUTO-ENTMCNC: 33.2 G/DL (ref 31.4–37.4)
MCV RBC AUTO: 92 FL (ref 82–98)
PLATELET # BLD AUTO: 318 THOUSANDS/UL (ref 149–390)
PMV BLD AUTO: 8.7 FL (ref 8.9–12.7)
PROTHROMBIN TIME: 16.4 SECONDS (ref 11.6–14.5)
RBC # BLD AUTO: 2.74 MILLION/UL (ref 3.88–5.62)
TIBC SERPL-MCNC: 174 UG/DL (ref 250–450)
VIT B12 SERPL-MCNC: 311 PG/ML (ref 100–900)
WBC # BLD AUTO: 7.48 THOUSAND/UL (ref 4.31–10.16)

## 2022-04-04 PROCEDURE — 85027 COMPLETE CBC AUTOMATED: CPT | Performed by: PHYSICIAN ASSISTANT

## 2022-04-04 PROCEDURE — 85610 PROTHROMBIN TIME: CPT | Performed by: PHYSICIAN ASSISTANT

## 2022-04-04 PROCEDURE — 99232 SBSQ HOSP IP/OBS MODERATE 35: CPT | Performed by: PHYSICIAN ASSISTANT

## 2022-04-04 PROCEDURE — 94760 N-INVAS EAR/PLS OXIMETRY 1: CPT

## 2022-04-04 PROCEDURE — 82728 ASSAY OF FERRITIN: CPT | Performed by: PHYSICIAN ASSISTANT

## 2022-04-04 PROCEDURE — 83540 ASSAY OF IRON: CPT | Performed by: PHYSICIAN ASSISTANT

## 2022-04-04 PROCEDURE — 99232 SBSQ HOSP IP/OBS MODERATE 35: CPT | Performed by: INTERNAL MEDICINE

## 2022-04-04 PROCEDURE — 82607 VITAMIN B-12: CPT | Performed by: PHYSICIAN ASSISTANT

## 2022-04-04 PROCEDURE — 83550 IRON BINDING TEST: CPT | Performed by: PHYSICIAN ASSISTANT

## 2022-04-04 PROCEDURE — 82746 ASSAY OF FOLIC ACID SERUM: CPT | Performed by: PHYSICIAN ASSISTANT

## 2022-04-04 PROCEDURE — 85730 THROMBOPLASTIN TIME PARTIAL: CPT | Performed by: NEUROLOGICAL SURGERY

## 2022-04-04 RX ORDER — POLYETHYLENE GLYCOL 3350 17 G/17G
17 POWDER, FOR SOLUTION ORAL DAILY PRN
Status: DISCONTINUED | OUTPATIENT
Start: 2022-04-04 | End: 2022-04-15 | Stop reason: HOSPADM

## 2022-04-04 RX ADMIN — GABAPENTIN 100 MG: 100 CAPSULE ORAL at 08:15

## 2022-04-04 RX ADMIN — DOCUSATE SODIUM 100 MG: 100 CAPSULE, LIQUID FILLED ORAL at 08:16

## 2022-04-04 RX ADMIN — HEPARIN SODIUM 17.1 UNITS/KG/HR: 10000 INJECTION, SOLUTION INTRAVENOUS; SUBCUTANEOUS at 02:32

## 2022-04-04 RX ADMIN — ACETAMINOPHEN 975 MG: 325 TABLET ORAL at 12:03

## 2022-04-04 RX ADMIN — FLECAINIDE ACETATE 100 MG: 100 TABLET ORAL at 17:18

## 2022-04-04 RX ADMIN — SENNOSIDES 8.6 MG: 8.6 TABLET, FILM COATED ORAL at 08:16

## 2022-04-04 RX ADMIN — VANCOMYCIN HYDROCHLORIDE 1750 MG: 5 INJECTION, POWDER, LYOPHILIZED, FOR SOLUTION INTRAVENOUS at 12:03

## 2022-04-04 RX ADMIN — TAMSULOSIN HYDROCHLORIDE 0.4 MG: 0.4 CAPSULE ORAL at 17:18

## 2022-04-04 RX ADMIN — PRAVASTATIN SODIUM 40 MG: 40 TABLET ORAL at 08:16

## 2022-04-04 RX ADMIN — METHOCARBAMOL TABLETS 750 MG: 750 TABLET, COATED ORAL at 12:03

## 2022-04-04 RX ADMIN — FLECAINIDE ACETATE 100 MG: 100 TABLET ORAL at 08:18

## 2022-04-04 RX ADMIN — ACETAMINOPHEN 975 MG: 325 TABLET ORAL at 21:12

## 2022-04-04 RX ADMIN — DULOXETINE HYDROCHLORIDE 60 MG: 60 CAPSULE, DELAYED RELEASE ORAL at 08:15

## 2022-04-04 RX ADMIN — METOPROLOL SUCCINATE 100 MG: 100 TABLET, EXTENDED RELEASE ORAL at 08:16

## 2022-04-04 RX ADMIN — GABAPENTIN 300 MG: 300 CAPSULE ORAL at 21:11

## 2022-04-04 RX ADMIN — HEPARIN SODIUM 17.1 UNITS/KG/HR: 10000 INJECTION, SOLUTION INTRAVENOUS; SUBCUTANEOUS at 18:56

## 2022-04-04 RX ADMIN — POTASSIUM CHLORIDE 40 MEQ: 20 TABLET, EXTENDED RELEASE ORAL at 08:15

## 2022-04-04 RX ADMIN — AMLODIPINE BESYLATE 5 MG: 5 TABLET ORAL at 08:16

## 2022-04-04 RX ADMIN — DOCUSATE SODIUM 100 MG: 100 CAPSULE, LIQUID FILLED ORAL at 17:18

## 2022-04-04 NOTE — TELEPHONE ENCOUNTER
Patient sees Dr Casillas Rounds  Patients spouse Jerome Cecily is calling in stating that he has been in the ED at [address] [facility] for like a month relating to a cervical spine  She is not thinking that he has a bad infection currently and does not feel like the Dr would even want to perform surgery on him on 5/3 to his right knee  He had mersa and is not doing well, she is asking what further she should be doing relating this  Please advise          Call back# [phone] Michael Hyde since patient cannot answer phone currently)

## 2022-04-04 NOTE — PROGRESS NOTES
[facility]  Progress Note - Delmi Romero 1950, 71 y o  male MRN: [id number]  Unit/Bed#: [facility] 473-35 Encounter: [id number]  Primary Care Provider: Marisabel Madsen MD   Date and time admitted to hospital: 3/31/2022 10:58 AM    * Surgical site infection  Assessment & Plan  History of cervical myelopathy, status post anterior cervical discectomy and fixation fusion C5/6 and C6/7; Posterior cervical decompression and instrumented fusion C3-T1 with Dr Beck Organ on 3/11/22  Discharged to [facility] for rehab on 3/18, then discharged home on 3/29  Noted to have increased redness and drainage at incision site, no improvement after 6 days of Keflex  Referred to ED from OP office visit for further management  · CT cervical spine: Large posterior soft tissue fluid collection, morphology suspicious for infection as clinically suspected  Study is nondiagnostic for evaluation of the central canal and epidural abscess  No specific findings of osteomyelitis     · Admitted under neurosurgery service  · Status post operative washout on 4/1   · ID following and managing antibiotics  · Wound cultures and OR cultures are growing MRSA   · Currently on vancomycin, will need 4 weeks IV antibiotics   · Blood cultures with no growth to date   · Analgesics as needed   · PT/OT recommending outpatient rehabilitation     Factor V Leiden mutation [facility])  Assessment & Plan  · On coumadin outpatient   · Heparin gtt started on 4/2 per neurosurgery, tentative plan to bridge to coumadin once drains are removed  · Monitor PT/INR    Anemia  Assessment & Plan  · Appears to be postop anemia since recent surgery in March, hemoglobin between 9-10 since that time and previously within normal limits   · Hemoglobin down-trending since operative washout, no signs or symptoms of active bleeding   · Will check additional work-up with iron panel, folate and B12    · Continue to monitor and transfuse as needed     Paroxysmal A-fib [facility])  Assessment & Plan  · Continue with home dose BB   · Neurosurgery has ordered heparin gtt to start    · Monitor PT/INR    Cervical myelopathy (HCC)  Assessment & Plan  · Status post cervical spinal fusion on 3/11/22 as above   · See related plan     Essential hypertension  Assessment & Plan  · BP acceptable, monitor routinely   · Continue home dose Norvasc, metoprolol     Mixed hyperlipidemia  Assessment & Plan  · Continue statin     WILL (obstructive sleep apnea)  Assessment & Plan  · CPAP qhs       VTE Pharmacologic Prophylaxis: VTE Score: 10 High Risk (Score >/= 5) - Pharmacological DVT Prophylaxis Ordered: heparin drip  Sequential Compression Devices Ordered  Patient Centered Rounds: I performed bedside rounds with nursing staff today  Discussions with Specialists or Other Care Team Provider: primary RN, case management     Education and Discussions with Family / Patient: defer family updates to primary service   Time Spent for Care: 15 minutes  More than 50% of total time spent on counseling and coordination of care as described above  Current Length of Stay: 4 day(s)  Current Patient Status: Inpatient   Certification Statement: The patient will continue to require additional inpatient hospital stay due to postop management per primary, dispo planning  Discharge Plan: [place] is following this patient on consult  They are medically stable for discharge when deemed appropriate by primary service  Code Status: Level 1 - Full Code    Subjective:   Patient offers no new complaints today, still with some left sided neck pain/discomfort associated with movement  States yesterday he was lazy and did not get OOB but he wants to "be a real human" today and get washed up and walking around       Objective:     Vitals:   Temp (24hrs), Av 1 °F (36 7 °C), Min:97 7 °F (36 5 °C), Max:98 4 °F (36 9 °C)    Temp:  [97 7 °F (36 5 °C)-98 4 °F (36 9 °C)] 98 4 °F (36 9 °C)  HR:  [60-63] 61  Resp:  [16] 16  BP: (128-144)/(65-71) 144/70  SpO2:  [93 %-95 %] 95 %  Body mass index is 32 78 kg/m²  Input and Output Summary (last 24 hours): Intake/Output Summary (Last 24 hours) at 4/4/2022 0843  Last data filed at 4/4/2022 0801  Gross per 24 hour   Intake 400 ml   Output 2330 ml   Net -1930 ml       Physical Exam:   Physical Exam  Vitals and nursing note reviewed  Constitutional:       General: He is not in acute distress  Appearance: He is obese  Comments: Sitting up in bed eating breakfast, cervical collar in place   Cardiovascular:      Rate and Rhythm: Normal rate and regular rhythm  Pulmonary:      Effort: Pulmonary effort is normal  No respiratory distress  Abdominal:      General: Abdomen is flat  There is no distension  Palpations: Abdomen is soft  Tenderness: There is no abdominal tenderness  Musculoskeletal:      Right lower leg: No edema  Left lower leg: No edema  Comments: Posterior cervical MARCIN drain x 2 with minimal serosanguineous output    Skin:     General: Skin is warm and dry  Coloration: Skin is not pale  Findings: No erythema  Comments: Anterior cervical surgical incision c/d/i   Neurological:      General: No focal deficit present  Mental Status: He is alert and oriented to person, place, and time  Mental status is at baseline  Additional Data:     Labs:  Results from last 7 days   Lab Units 04/04/22  0826 04/03/22  0051 04/03/22  0051   WBC Thousand/uL 7 48   < > 6 73   HEMOGLOBIN g/dL 8 4*   < > 7 9*   HEMATOCRIT % 25 3*   < > 24 8*   PLATELETS Thousands/uL 318   < > 308   NEUTROS PCT %  --   --  65   LYMPHS PCT %  --   --  23   MONOS PCT %  --   --  7   EOS PCT %  --   --  3    < > = values in this interval not displayed       Results from last 7 days   Lab Units 04/03/22  0051   SODIUM mmol/L 138   POTASSIUM mmol/L 4 0   CHLORIDE mmol/L 106   CO2 mmol/L 29   BUN mg/dL 17   CREATININE mg/dL 0 72   ANION GAP mmol/L 3*   CALCIUM mg/dL 8 4 GLUCOSE RANDOM mg/dL 122     Results from last 7 days   Lab Units 04/02/22  1041   INR  1 44*     Results from last 7 days   Lab Units 04/01/22  1707   POC GLUCOSE mg/dl 126               Lines/Drains:  Invasive Devices  Report    Peripherally Inserted Central Catheter Line            PICC Line 74/98/92 Right Basilic 1 day          Peripheral Intravenous Line            Peripheral IV 03/31/22 Right Forearm 3 days          Drain            Closed/Suction Drain Posterior; Left Neck Bulb 7 Fr  2 days    Closed/Suction Drain Posterior;Right Neck Bulb 7 Fr  2 days                Central Line:  Goal for removal: N/A - Discharging with PICC for IV ABX/medications             Imaging: No pertinent imaging reviewed  Recent Cultures (last 7 days):   Results from last 7 days   Lab Units 04/01/22  1525 03/31/22  1836 03/31/22  1619   BLOOD CULTURE   --   --  No Growth at 72 hrs  No Growth at 72 hrs     GRAM STAIN RESULT  3+ Disintegrating polys*  1+ Gram positive cocci in pairs* 2+ Polys*  2+ Gram positive cocci in clusters*  --    WOUND CULTURE   --  2+ Growth of Methicillin Resistant Staphylococcus aureus*  --        Last 24 Hours Medication List:   Current Facility-Administered Medications   Medication Dose Route Frequency Provider Last Rate    acetaminophen  975 mg Oral TID PRN Alison Biundo, PA-C      amLODIPine  5 mg Oral Daily Alison Biundo, PA-C      docusate sodium  100 mg Oral BID Alison Biundo, PA-C      DULoxetine  60 mg Oral Daily Alison Biundo, PA-C      flecainide  100 mg Oral BID Alison Biundo, PA-C      gabapentin  100 mg Oral Daily Alison Biundo, PA-C      gabapentin  300 mg Oral HS Alison Biundo, PA-C      heparin (porcine)  3-20 Units/kg/hr (Order-Specific) Intravenous Titrated DEEPTHI Sanders 17 1 Units/kg/hr (04/04/22 0232)    methocarbamol  750 mg Oral Q6H PRN DEEPTHI Sandesr      metoprolol succinate  100 mg Oral Daily Alison Giana, FABIOLA  ondansetron  4 mg Intravenous Q6H PRN Alison BiundoFABIOLA      oxyCODONE  2 5 mg Oral Q4H PRN Alison Biundo, FABIOLA      oxyCODONE  5 mg Oral Q4H PRN Alison Biundo, FABIOLA      potassium chloride  40 mEq Oral Daily Alison Biundo, FABIOLA      pravastatin  40 mg Oral Daily Alison Biundo, FABIOLA      senna  8 6 mg Oral Daily Alison Karanundo, FABIOLA      sodium chloride  75 mL/hr Intravenous Continuous Yuliya Armijo PA-C Stopped (04/04/22 1022)    tamsulosin  0 4 mg Oral Daily With FABIOLA Pradhan      vancomycin  1,750 mg Intravenous Q12H DEEPTHI Sanders 1,750 mg (04/03/22 3693)        Today, Patient Was Seen By: Aram Urbano PA-C    **Please Note: This note may have been constructed using a voice recognition system  ** Spontaneous, unlabored and symmetrical

## 2022-04-04 NOTE — ASSESSMENT & PLAN NOTE
History of factor five Leiden along with DVT/PE along with Afib       Last dose coumadin 3/31  On heparin gtt, therapeutic

## 2022-04-04 NOTE — ASSESSMENT & PLAN NOTE
POD 4 posterior cervical evacuation of postoperative collection and debridement with placement of drains C3-T1  · S/p Anterior cervical discectomy and fixation fusion C5/6 and C6/7; Posterior cervical decompression and instrumented fusion C3-T1 with Dr Annette Caal on 3/11/22  · Presented as a direct admission from the office on 03/31 with concerns for wound infection    Imaging:   · CT Cervical w/ contrast 3/31/2022: Large posterior soft tissue fluid collection, morphology suspicious for infection as clinically suspected  Study is nondiagnostic for evaluation of the central canal and epidural abscess  MR is better suited intracanalicular evaluation although will be limited by artifact from metal hardware  Plan:   Continue to monitor neurological exam and symptoms   Blood Cx no growth after 4 days    Wound cx 3/31 + MRSA   Intraop cx tissue culture 4/1 + MRSA   [place] collar to remain in place at all times, okay for [place] collar for showering   2 MARCIN drains to FS, will change to gravity today:  o Left 60 mL/24 hours  o Right 100 mL/24 hours  · Hg 8 4, yesterday, continue to monitor   · SLIM and ID consulted - appreciate recommendations  · Patient will require 4 weeks of IV vancomycin followed by 4 weeks of p o  antibiotics  · PICC placed  · Maintain vancomycin 1,750 mg q12h per ID  · Continue to monitor incision  · Pain well controlled on current pain regimen:  · Gabapentin 100mg daily and 300mg at HS  · Tylenol 975 mg TID prn  · Robaxin 750 mg q 6 hours p r n   · Oxycodone 2 5-5mg q 4 hours p r n  For moderate or severe pain  · Bowel regimen:  Colace, Senokot, and MiraLax  · Mobilize with PT/OT  Home with outpatient rehab  · DVT ppx: SCDs, on heparin gtt    Plan of care discussed with MIREILLE Amato  Neurosurgery will follow as primary, call with any further questions or concerns

## 2022-04-04 NOTE — PLAN OF CARE
Problem: PAIN - ADULT  Goal: Verbalizes/displays adequate comfort level or baseline comfort level  Description: Interventions:  - Encourage patient to monitor pain and request assistance  - Assess pain using appropriate pain scale  - Administer analgesics based on type and severity of pain and evaluate response  - Implement non-pharmacological measures as appropriate and evaluate response  - Consider cultural and social influences on pain and pain management  - Notify physician/advanced practitioner if interventions unsuccessful or patient reports new pain  Outcome: Progressing     Problem: SAFETY ADULT  Goal: Patient will remain free of falls  Description: INTERVENTIONS:  - Educate patient/family on patient safety including physical limitations  - Instruct patient to call for assistance with activity   - Consult OT/PT to assist with strengthening/mobility   - Keep Call bell within reach  - Keep bed low and locked with side rails adjusted as appropriate  - Keep care items and personal belongings within reach  - Initiate and maintain comfort rounds  - Make Fall Risk Sign visible to staff  - Offer Toileting   - Obtain necessary fall risk management equipment  - Apply yellow socks and bracelet for high fall risk patients  - Consider moving patient to room near nurses station  Outcome: Progressing  Goal: Maintain or return to baseline ADL function  Description: INTERVENTIONS:  -  Assess patient's ability to carry out ADLs; assess patient's baseline for ADL function and identify physical deficits which impact ability to perform ADLs (bathing, care of mouth/teeth, toileting, grooming, dressing, etc )  - Assess/evaluate cause of self-care deficits   - Assess range of motion  - Assess patient's mobility; develop plan if impaired  - Assess patient's need for assistive devices and provide as appropriate  - Encourage maximum independence but intervene and supervise when necessary  - Involve family in performance of ADLs  - Assess for home care needs following discharge   - Consider OT consult to assist with ADL evaluation and planning for discharge  - Provide patient education as appropriate  Outcome: Progressing  Goal: Maintains/Returns to pre admission functional level  Description: INTERVENTIONS:  - Perform BMAT or MOVE assessment daily    - Set and communicate daily mobility goal to care team and patient/family/caregiver     - Collaborate with rehabilitation services on mobility goals if consulted  - Out of bed for toileting  - Record patient progress and toleration of activity level   Outcome: Progressing

## 2022-04-04 NOTE — PROGRESS NOTES
Vancomycin IV Pharmacy-to-Dose Consultation    Sarmad Adjutant is a 71 y o  male who is currently receiving Vancomycin IV with management by the Pharmacy Consult service for the treatment of postoperative MRSA cervical wound infection, rule out vertebral osteomyelitis    Assessment/Plan:  The patient was reviewed  Renal function is stable and no signs or symptoms of nephrotoxicity and/or infusion reactions were documented in the chart  Based on todays assessment, continue current vancomycin (day # 5) dosing of 1750mg q12hrs, with a plan for trough to be drawn at 1100 on 4/5  We will continue to follow the patients culture results and clinical progress daily      Dunia Roman, Pharmacist

## 2022-04-04 NOTE — PROGRESS NOTES
Progress Note - Infectious Disease   Neville Sherman 71 y o  male MRN: [id number]  Unit/Bed#: [facility] 608-01 Encounter: [id number]      Impression:  1  Postoperative MRSA cervical wound infection R/0 vertebral osteomyelitis S/P posterior cervical a VAC UA shin of postoperative collection and debridement with placement of drains C3-T1 POD 3  2  S/P  anterior cervical diskectomy and fixation fusion C5-6 and C6-7 posterior cervical decompression and instrumented fusion C3-T1 on 22  3  History of PAF on Coumadin  4  Factor 5 Leiden mutation  5  WILL    Recommendations:  Afebrile with normal WBC count  Hemoglobin is 8 4  Wound dressing has scanty serosanguineous drainage  1  Final cultures are showing MRSA  2  Continue vancomycin IV with further adjustment of dose by pharmacy  3  Would anticipate at least a four-week IV vancomycin course followed by approximately 4 weeks of p o  Rx   4  PICC line in place  5  Discussed with Neurosurgery CRNP    Antibiotics:  1  Vancomycin 1750 mg q 12 hours IV, day 4 of 28 Rx    Subjective:  Left posterior jaw discomfort  Denies fevers, chills, or sweats  Denies nausea, vomiting, or diarrhea  Objective:  Vitals:  Temp:  [97 7 °F (36 5 °C)-98 4 °F (36 9 °C)] 97 7 °F (36 5 °C)  HR:  [60-69] 69  Resp:  [16-18] 18  BP: (133-144)/(68-74) 133/68  SpO2:  [93 %-95 %] 95 %  Temp (24hrs), Av 1 °F (36 7 °C), Min:97 7 °F (36 5 °C), Max:98 4 °F (36 9 °C)  Current: Temperature: 97 7 °F (36 5 °C)    Physical Exam:     General Appearance:  Alert, nontoxic, no acute distress  Vista collar in place  Posterior cervical wound with 2 MARCIN drains and 1+ surrounding erythema   Throat: Oropharynx moist without lesions  Lips, mucosa, and tongue normal   Neck: Vista collar with posterior cervical wound with staples and retention sutures and 2 MARCIN drains with serosanguineous drainage scantt serosanguineous drainage on dressing    1+ surrounding erythema as per picture on chart   Lungs:   Clear to auscultation bilaterally, no audible wheezes, rhonchi or rales; respirations unlabored   Heart:  Regular rate and rhythm, S1, S2 normal, no murmur, rub or gallop   Abdomen:   Soft, non-tender, surgical scars non-distended, positive bowel sounds  No masses, no organomegaly    No CVA tenderness   Extremities: Extremities normal, atraumatic, no clubbing, cyanosis or edema  PICC line right basilic   Skin: As above, surgical scar  Invasive Devices  Report    Peripherally Inserted Central Catheter Line            PICC Line 08/75/46 Right Basilic 2 days          Drain            Closed/Suction Drain Posterior; Left Neck Bulb 7 Fr  3 days    Closed/Suction Drain Posterior;Right Neck Bulb 7 Fr  3 days                Labs, Imaging, & Other studies:   All pertinent labs were personally reviewed  Results from last 7 days   Lab Units 04/04/22  0826 04/03/22  0051 04/02/22  1041   WBC Thousand/uL 7 48 6 73 8 12   HEMOGLOBIN g/dL 8 4* 7 9* 9 8*   PLATELETS Thousands/uL 318 308 347     Results from last 7 days   Lab Units 04/03/22  0051 04/02/22  1041 04/02/22  1041 03/31/22  1618 03/31/22  1618   SODIUM mmol/L 138  --  137  --  134*   POTASSIUM mmol/L 4 0   < > 4 0   < > 4 0   CHLORIDE mmol/L 106   < > 103   < > 98*   CO2 mmol/L 29   < > 29   < > 31   BUN mg/dL 17   < > 15   < > 13   CREATININE mg/dL 0 72   < > 0 88   < > 0 64   EGFR ml/min/1 73sq m 93   < > 86   < > 98   CALCIUM mg/dL 8 4   < > 10 3*   < > 9 8    < > = values in this interval not displayed  Results from last 7 days   Lab Units 04/01/22  1525 03/31/22  1836 03/31/22  1619   BLOOD CULTURE   --   --  No Growth at 72 hrs  No Growth at 72 hrs     GRAM STAIN RESULT  3+ Disintegrating polys*  1+ Gram positive cocci in pairs* 2+ Polys*  2+ Gram positive cocci in clusters*  --    WOUND CULTURE   --  2+ Growth of Methicillin Resistant Staphylococcus aureus*  --

## 2022-04-04 NOTE — ASSESSMENT & PLAN NOTE
POD 3 posterior cervical evacuation of postoperative collection and debridement with placement of drains C3-T1  · S/p Anterior cervical discectomy and fixation fusion C5/6 and C6/7; Posterior cervical decompression and instrumented fusion C3-T1 with Dr Elmira Selby on 3/11/22  · Presented as a direct admission from the office on 03/31 with concerns for wound infection    Imaging:   · CT Cervical w/ contrast 3/31/2022: Large posterior soft tissue fluid collection, morphology suspicious for infection as clinically suspected  Study is nondiagnostic for evaluation of the central canal and epidural abscess  MR is better suited intracanalicular evaluation although will be limited by artifact from metal hardware  Plan:   Continue to monitor neurological exam and symptoms   Blood Cx neg x 72 hours   Wound cx 3/31 + MRSA   Intraop cx tissue culture 4/1 + MRSA   [place] collar to remain in place at all times, okay for [place] collar for showering   2 MARCIN drains to FS:  o Left 25 mL/24 hours  o Right 35 mL/24 hours  · Hg 8 4, continue to monitor   · SLIM and ID consulted - appreciate recommendations  · Patient will require 4 weeks of IV vancomycin followed by 4 weeks of p o  antibiotics  · PICC placed  · Maintain vancomycin 1,750 mg q12h per ID  · Pain well controlled on current pain regimen:  · Gabapentin 100mg daily and 300mg at HS  · Tylenol 975 mg TID prn  · Robaxin 750 mg q 6 hours p r n   · Oxycodone 2 5-5mg q 4 hours p r n  For moderate or severe pain  · Bowel regimen:  Colace, Senokot, and MiraLax  · Mobilize with PT/OT  Home with outpatient rehab  · DVT ppx: SCDs, on heparin gtt    Plan of care discussed with MIREILLE Rodrigez  Neurosurgery will follow as primary, call with any further questions or concerns

## 2022-04-04 NOTE — PROGRESS NOTES
[facility] [facility]  Progress Note - Kavita Magdaleno 1950, 71 y o  male MRN: [id number]  Unit/Bed#: [facility] 515-18 Encounter: [id number]  Primary Care Provider: Agustin Kidd MD   Date and time admitted to hospital: 3/31/2022 10:58 AM    Cervical myelopathy ([address] )  Assessment & Plan  POD 3 posterior cervical evacuation of postoperative collection and debridement with placement of drains C3-T1  · S/p Anterior cervical discectomy and fixation fusion C5/6 and C6/7; Posterior cervical decompression and instrumented fusion C3-T1 with Dr Soledad Siegel on 3/11/22  · Presented as a direct admission from the office on 03/31 with concerns for wound infection    Imaging:   · CT Cervical w/ contrast 3/31/2022: Large posterior soft tissue fluid collection, morphology suspicious for infection as clinically suspected  Study is nondiagnostic for evaluation of the central canal and epidural abscess  MR is better suited intracanalicular evaluation although will be limited by artifact from metal hardware  Plan:   Continue to monitor neurological exam and symptoms   Blood Cx neg x 72 hours   Wound cx 3/31 + MRSA   Intraop cx tissue culture 4/1 + MRSA   [place] collar to remain in place at all times, okay for [place] collar for showering   2 MARCIN drains to FS:  o Left 25 mL/24 hours  o Right 35 mL/24 hours  · Hg 8 4, continue to monitor   · SLIM and ID consulted - appreciate recommendations  · Patient will require 4 weeks of IV vancomycin followed by 4 weeks of p o  antibiotics  · PICC placed  · Maintain vancomycin 1,750 mg q12h per ID  · Pain well controlled on current pain regimen:  · Gabapentin 100mg daily and 300mg at HS  · Tylenol 975 mg TID prn  · Robaxin 750 mg q 6 hours p r n   · Oxycodone 2 5-5mg q 4 hours p r n  For moderate or severe pain  · Bowel regimen:  Colace, Senokot, and MiraLax  · Mobilize with PT/OT    Home with outpatient rehab  · DVT ppx: SCDs, on heparin gtt    Plan of care discussed with MIREILLE Tejeda  Neurosurgery will follow as primary, call with any further questions or concerns  At risk for venous thromboembolism (VTE)  Assessment & Plan  History of factor five Leiden along with DVT/PE along with Afib  Last dose coumadin 3/31  On heparin gtt, therapeutic      Factor V Leiden mutation [facility])  Assessment & Plan  Therapeutic on heparin gtt  Plan to bridge to Coumadin once drains are discontinued      Subjective/Objective     Chief Complaint: "I feel okay"    Subjective:  Patient complaining of some incisional pain as well as muscle spasms more to the left side of his neck which he rates 6-7/10 which does not radiate into his arms  He denies any headaches, dizziness, blurry vision, chest pain, shortness of breath, abdominal pain, nausea, vomiting, diarrhea, no problems with bowel or bladder, no new weakness or numbness/tingling  Objective:  Patient comfortably lying in bed eating lunch with 2 posterior cervical drains in place as well as Vista collar, NAD  I/O       04/02 0701 04/03 0700 04/03 0701 04/04 0700 04/04 0701  04/05 0700    P  O  300 400 480    I V  (mL/kg) 628 5 (5 9)      IV Piggyback 550      Total Intake(mL/kg) 1478 5 (13 8) 400 (3 7) 480 (4 5)    Urine (mL/kg/hr) 1200 (0 5) 1500 (0 6) 1225 (1 7)    Drains 73 60 70    Stool   0    Total Output 1273 1560 1295    Net +205 5 -1160 -815           Unmeasured Urine Occurrence 1 x  3 x    Unmeasured Stool Occurrence   1 x          Invasive Devices  Report    Peripherally Inserted Central Catheter Line            PICC Line 80/11/61 Right Basilic 2 days          Drain            Closed/Suction Drain Posterior; Left Neck Bulb 7 Fr  2 days    Closed/Suction Drain Posterior;Right Neck Bulb 7 Fr  2 days                Physical Exam:  Vitals: Blood pressure 133/68, pulse 69, temperature 97 7 °F (36 5 °C), resp  rate 18, height 5' 11" (1 803 m), weight 107 kg (235 lb), SpO2 95 %  ,Body mass index is 32 78 kg/m²  General appearance: alert, appears stated age, cooperative and no distress  Head: Normocephalic, without obvious abnormality, atraumatic  Eyes: EOMI, PERRL, conjugate gaze  Neck:  Vista collar in place, updated photo of incision under media, incision clean, dry, intact, well approximated with staples and retention sutures, 2 MARCIN drains to FS in place  Lungs: non labored breathing  Heart: regular heart rate  Neurologic:   Mental status: Alert, oriented x3, thought content appropriate, speech is clear, following commands  Cranial nerves: grossly intact (Cranial nerves II-XII)  Sensory: normal to LT in all extremities x4, JPS and DST intact  Motor: moving all extremities, strength 5/5 throughout except right lower extremity 4+/5 throughout  Reflexes: 2+ and symmetric, no Jiménez's noted but bilateral clonus appreciated  Coordination: finger to nose normal bilaterally, no drift bilaterally            Lab Results:  Results from last 7 days   Lab Units 04/04/22  0826 04/03/22  0051 04/02/22  1041 03/31/22  1618 03/31/22  1618 03/29/22  0606 03/29/22  0606   WBC Thousand/uL 7 48 6 73 8 12   < > 6 86   < > 8 31   HEMOGLOBIN g/dL 8 4* 7 9* 9 8*   < > 10 4*   < > 9 4*   HEMATOCRIT % 25 3* 24 8* 29 9*   < > 32 0*   < > 28 8*   PLATELETS Thousands/uL 318 308 347   < > 365   < > 291   NEUTROS PCT %  --  65  --   --  70  --  82*   MONOS PCT %  --  7  --   --  6  --  6    < > = values in this interval not displayed       Results from last 7 days   Lab Units 04/03/22  0051 04/02/22  1041 03/31/22  1618   POTASSIUM mmol/L 4 0 4 0 4 0   CHLORIDE mmol/L 106 103 98*   CO2 mmol/L 29 29 31   BUN mg/dL 17 15 13   CREATININE mg/dL 0 72 0 88 0 64   CALCIUM mg/dL 8 4 10 3* 9 8     Results from last 7 days   Lab Units 03/29/22  0606   MAGNESIUM mg/dL 1 9         Results from last 7 days   Lab Units 04/04/22  0826 04/03/22  2217 04/03/22  1614 04/02/22  1801 04/02/22  1041 04/01/22  1148 04/01/22  1148   INR  1 39*  --   --   -- 1 44*  --  2 26*   PTT seconds 79* 75* 86*   < > 36   < > 50*    < > = values in this interval not displayed  No results found for: TROPONINT  ABG:No results found for: PHART, MIO8FVL, PO2ART, RGO0SVQ, H2TONGJE, BEART, SOURCE    Imaging Studies: I have personally reviewed pertinent reports  and I have personally reviewed pertinent films in PACS    CT spine cervical w contrast    Result Date: 3/31/2022  Impression: Large posterior soft tissue fluid collection, morphology suspicious for infection as clinically suspected  Study is nondiagnostic for evaluation of the central canal and epidural abscess  MR is better suited intracanalicular evaluation although will be limited by artifact from metal hardware  No specific findings of osteomyelitis  Workstation performed: [id number]       EKG, Pathology, and Other Studies: I have personally reviewed pertinent reports        VTE Pharmacologic Prophylaxis: Heparin    VTE Mechanical Prophylaxis: sequential compression device

## 2022-04-04 NOTE — PROGRESS NOTES
Vancomycin IV Pharmacy-to-Dose Consultation    Theo Kitchen is a 71 y o  male who is currently receiving Vancomycin IV with management by the Pharmacy Consult service for the treatment of postoperative MRSA cervical wound infection, rule out vertebral osteomyelitis    Assessment/Plan:  The patient was reviewed  Renal function is stable and no signs or symptoms of nephrotoxicity and/or infusion reactions were documented in the chart  Based on todays assessment, continue current vancomycin (day # 5) dosing of 1750mg q12hrs, with a plan for trough to be drawn at 1100 on 4/5  We will continue to follow the patients culture results and clinical progress daily      Ranee Bence, Pharmacist

## 2022-04-04 NOTE — CASE MANAGEMENT
Case Management Discharge Planning Note    Patient name Orma Shown  Location [facility] 608/[facility] 122-26 MRN [id number]  : 1950 Date 2022       Current Admission Date: 3/31/2022  Current Admission Diagnosis:Surgical site infection   Patient Active Problem List    Diagnosis Date Noted    Surgical site infection 2022    Status post cervical spinal fusion 2022    Hypokalemia 2022    At risk for venous thromboembolism (VTE) 2022    Anemia 2022    Head pain cephalgia 2022    Hyponatremia 2022    Urinary dysfunction 2022    Muscle spasm 2022    Goals of care, counseling/discussion 2022    Sinus bradycardia 03/10/2022    Cervical myelopathy ([facility] ) 2022    Ambulatory dysfunction 2022    Weakness 2022    Pes anserinus bursitis of right knee 2021    IFG (impaired fasting glucose) 2021    Memory difficulty 03/10/2021    History of DVT of lower extremity 2021    Mixed hyperlipidemia 10/27/2020    Paroxysmal A-fib ([facility] ) 2020    Lower extremity edema 2020    Primary osteoarthritis of left knee 2020    Primary osteoarthritis of right knee 2020    Depression, major, single episode, moderate ([facility] ) 10/30/2017    Insomnia 03/10/2017    Lumbar herniated disc 03/10/2017    Erectile dysfunction 2016    Status post catheter ablation of atrial flutter 2015    Essential hypertension 2015    WILL (obstructive sleep apnea) 2015    Factor V Leiden mutation ([facility] ) 2014      LOS (days): 4  Geometric Mean LOS (GMLOS) (days): 3 50  Days to GMLOS:-0 5     OBJECTIVE:  Risk of Unplanned Readmission Score: 23         Current admission status: Inpatient   Preferred Pharmacy:   [address], [address]  [address] [facility], [address]  [facility] [address]  Phone: [phone] Fax: [phone]    [id number] [place] [address]  Phone: [phone] Fax: [phone]    Primary Care Provider: Chely Glynn MD    Primary Insurance: [facility]  Secondary Insurance:     DISCHARGE DETAILS:    Discharge planning discussed with[de-identified] Patient  Freedom of Choice: Yes  Comments - Freedom of Choice: Discussed FOC  CM contacted family/caregiver?: Yes  Were Treatment Team discharge recommendations reviewed with patient/caregiver?: Yes  Did patient/caregiver verbalize understanding of patient care needs?: Yes  Were patient/caregiver advised of the risks associated with not following Treatment Team discharge recommendations?: Yes    Contacts  Patient Contacts: Brain Band  Relationship to Patient[de-identified] Family  Contact Method: Phone  Phone Number: [phone]  Reason/Outcome: Discharge Planning,Continuity of [address]         Is the patient interested in [facility] at discharge?: Yes  [address] requested[de-identified] [address] Name[de-identified] [facility] Provider[de-identified] PCP  Home Health Services Needed[de-identified] Strengthening/Theraputic Exercises to Improve Function,Evaluate Functional Status and Safety  Homebound Criteria Met[de-identified] Requires the Assistance of Another Person for Safe Ambulation or to Leave the Home,Uses an Assist Device (i e  cane, walker, etc)  Supporting Clincal Findings[de-identified] Fatigues Easliy in Short Distances,Limited Endurance         Other Referral/Resources/Interventions Provided:  Referral Comments: Patient and wife agreeable to referral to [facility] for IV antibiotics through [facility], referral entered in [place]

## 2022-04-05 LAB
APTT PPP: 61 SECONDS (ref 23–37)
APTT PPP: 81 SECONDS (ref 23–37)
APTT PPP: 98 SECONDS (ref 23–37)
BACTERIA BLD CULT: NORMAL
BACTERIA BLD CULT: NORMAL
BACTERIA UR QL AUTO: ABNORMAL /HPF
BILIRUB UR QL STRIP: NEGATIVE
CLARITY UR: CLEAR
COLOR UR: ABNORMAL
CRP SERPL QL: 47.4 MG/L
ERYTHROCYTE [SEDIMENTATION RATE] IN BLOOD: 55 MM/HOUR (ref 0–19)
GLUCOSE UR STRIP-MCNC: NEGATIVE MG/DL
HGB UR QL STRIP.AUTO: ABNORMAL
KETONES UR STRIP-MCNC: NEGATIVE MG/DL
LEUKOCYTE ESTERASE UR QL STRIP: NEGATIVE
MUCOUS THREADS UR QL AUTO: ABNORMAL
NITRITE UR QL STRIP: NEGATIVE
NON-SQ EPI CELLS URNS QL MICRO: ABNORMAL /HPF
PH UR STRIP.AUTO: 5.5 [PH]
PROT UR STRIP-MCNC: NEGATIVE MG/DL
RBC #/AREA URNS AUTO: ABNORMAL /HPF
SP GR UR STRIP.AUTO: 1.01 (ref 1–1.03)
UROBILINOGEN UR STRIP-ACNC: <2 MG/DL
WBC #/AREA URNS AUTO: ABNORMAL /HPF

## 2022-04-05 PROCEDURE — 85730 THROMBOPLASTIN TIME PARTIAL: CPT | Performed by: HOSPITALIST

## 2022-04-05 PROCEDURE — 85652 RBC SED RATE AUTOMATED: CPT | Performed by: PHYSICIAN ASSISTANT

## 2022-04-05 PROCEDURE — 80048 BASIC METABOLIC PNL TOTAL CA: CPT | Performed by: NURSE PRACTITIONER

## 2022-04-05 PROCEDURE — 99232 SBSQ HOSP IP/OBS MODERATE 35: CPT | Performed by: INTERNAL MEDICINE

## 2022-04-05 PROCEDURE — 86140 C-REACTIVE PROTEIN: CPT | Performed by: PHYSICIAN ASSISTANT

## 2022-04-05 PROCEDURE — 85730 THROMBOPLASTIN TIME PARTIAL: CPT | Performed by: NEUROLOGICAL SURGERY

## 2022-04-05 PROCEDURE — 81001 URINALYSIS AUTO W/SCOPE: CPT | Performed by: PHYSICIAN ASSISTANT

## 2022-04-05 PROCEDURE — 94760 N-INVAS EAR/PLS OXIMETRY 1: CPT

## 2022-04-05 PROCEDURE — 99024 POSTOP FOLLOW-UP VISIT: CPT | Performed by: NURSE PRACTITIONER

## 2022-04-05 RX ADMIN — FLECAINIDE ACETATE 100 MG: 100 TABLET ORAL at 08:28

## 2022-04-05 RX ADMIN — OXYCODONE HYDROCHLORIDE 5 MG: 5 TABLET ORAL at 16:39

## 2022-04-05 RX ADMIN — HEPARIN SODIUM 15.1 UNITS/KG/HR: 10000 INJECTION, SOLUTION INTRAVENOUS; SUBCUTANEOUS at 11:10

## 2022-04-05 RX ADMIN — METHOCARBAMOL TABLETS 750 MG: 750 TABLET, COATED ORAL at 16:43

## 2022-04-05 RX ADMIN — GABAPENTIN 100 MG: 100 CAPSULE ORAL at 08:26

## 2022-04-05 RX ADMIN — ACETAMINOPHEN 975 MG: 325 TABLET ORAL at 22:10

## 2022-04-05 RX ADMIN — VANCOMYCIN HYDROCHLORIDE 1750 MG: 5 INJECTION, POWDER, LYOPHILIZED, FOR SOLUTION INTRAVENOUS at 08:28

## 2022-04-05 RX ADMIN — GABAPENTIN 300 MG: 300 CAPSULE ORAL at 22:10

## 2022-04-05 RX ADMIN — FLECAINIDE ACETATE 100 MG: 100 TABLET ORAL at 16:39

## 2022-04-05 RX ADMIN — DULOXETINE HYDROCHLORIDE 60 MG: 60 CAPSULE, DELAYED RELEASE ORAL at 08:27

## 2022-04-05 RX ADMIN — VANCOMYCIN HYDROCHLORIDE 1750 MG: 5 INJECTION, POWDER, LYOPHILIZED, FOR SOLUTION INTRAVENOUS at 20:04

## 2022-04-05 RX ADMIN — POTASSIUM CHLORIDE 40 MEQ: 20 TABLET, EXTENDED RELEASE ORAL at 08:26

## 2022-04-05 RX ADMIN — PRAVASTATIN SODIUM 40 MG: 40 TABLET ORAL at 08:27

## 2022-04-05 RX ADMIN — TAMSULOSIN HYDROCHLORIDE 0.4 MG: 0.4 CAPSULE ORAL at 16:39

## 2022-04-05 RX ADMIN — DOCUSATE SODIUM 100 MG: 100 CAPSULE, LIQUID FILLED ORAL at 08:27

## 2022-04-05 RX ADMIN — AMLODIPINE BESYLATE 5 MG: 5 TABLET ORAL at 08:27

## 2022-04-05 RX ADMIN — METOPROLOL SUCCINATE 100 MG: 100 TABLET, EXTENDED RELEASE ORAL at 08:27

## 2022-04-05 NOTE — PROGRESS NOTES
[address] [facility]  Progress Note - Marj Valerio 1950, 71 y o  male MRN: [id number]  Unit/Bed#: [facility] 470-39 Encounter: [id number]  Primary Care Provider: Chely Glynn MD   Date and time admitted to hospital: 3/31/2022 10:58 AM    Home warfarin regimen 2 5 mg daily except weds/sat takes 5 mg     * Surgical site infection  Assessment & Plan  History of cervical myelopathy, status post anterior cervical discectomy and fixation fusion C5/6 and C6/7; Posterior cervical decompression and instrumented fusion C3-T1 with Dr Mak Dudley on 3/11/22  Discharged to [facility] for rehab on 3/18, then discharged home on 3/29  Noted to have increased redness and drainage at incision site, no improvement after 6 days of Keflex  Referred to ED from OP office visit for further management  · CT cervical spine: Large posterior soft tissue fluid collection, morphology suspicious for infection as clinically suspected  Study is nondiagnostic for evaluation of the central canal and epidural abscess  No specific findings of osteomyelitis     · Admitted under neurosurgery service  · Status post operative washout on 4/1   · ID following and managing antibiotics  · Wound cultures and OR cultures are growing MRSA   · Currently on vancomycin, will need 4 weeks IV antibiotics, PICC line in place  · Blood cultures with no growth to date   · Analgesics as needed   · PT/OT recommending outpatient rehabilitation     Cervical myelopathy [facility])  Assessment & Plan  Status post cervical spinal fusion on 3/11/22 as above   · See related plan     Anemia  Assessment & Plan  Appears to be postop anemia since recent surgery in March, hemoglobin between 9-10 since that time and previously within normal limits   · Hemoglobin down-trending since operative washout, no signs or symptoms of active bleeding   · folate and B12 WNL  · Low Fe level however elevated ferritin  · Continue to monitor and transfuse as needed     Mixed hyperlipidemia  Assessment & Plan  · Continue statin     Paroxysmal A-fib (HCC)  Assessment & Plan  Continue with home dose BB   · Currently on heparin gtt while off home warfarin, bridge when appropriate per primary with goal INR 2-3    Factor V Leiden mutation [facility])  Assessment & Plan  On coumadin outpatient  Last dose 3/31  · Heparin gtt started on  per neurosurgery, tentative plan to bridge to coumadin once drains are removed  · Monitor PT/INR    WILL (obstructive sleep apnea)  Assessment & Plan  · CPAP qhs     Essential hypertension  Assessment & Plan  BP acceptable, monitor routinely   · Continue home dose Norvasc, metoprolol         VTE Pharmacologic Prophylaxis: VTE Score: 10 Moderate Risk (Score 3-4) - Pharmacological DVT Prophylaxis Ordered: heparin drip  Patient Centered Rounds: I performed bedside rounds with nursing staff today  Discussions with Specialists or Other Care Team Provider: appreciate primary team    Education and Discussions with Family / Patient: Family updates per primary team      Time Spent for Care: 20 minutes  More than 50% of total time spent on counseling and coordination of care as described above  Current Length of Stay: 5 day(s)  Current Patient Status: Inpatient   Certification Statement: The patient will continue to require additional inpatient hospital stay due to Per primary service  Discharge Plan: Dago White is following this patient on consult  They are not yet medically stable for discharge secondary to Needs to bridge back to warfarin with INR above 2  Code Status: Level 1 - Full Code    Subjective:   Doing okay  Has some L sided lateral neck pain  No other major complaints       Objective:     Vitals:   Temp (24hrs), Av 9 °F (36 6 °C), Min:97 7 °F (36 5 °C), Max:98 °F (36 7 °C)    Temp:  [97 7 °F (36 5 °C)-98 °F (36 7 °C)] 97 8 °F (36 6 °C)  HR:  [62-69] 62  Resp:  [18-19] 19  BP: (116-150)/(50-74) 150/73  SpO2:  [91 %-96 %] 93 %  Body mass index is 32 78 kg/m²  Input and Output Summary (last 24 hours): Intake/Output Summary (Last 24 hours) at 4/5/2022 0855  Last data filed at 4/5/2022 0501  Gross per 24 hour   Intake 1030 ml   Output 2335 ml   Net -1305 ml       Physical Exam:   Physical Exam  Vitals and nursing note reviewed  Constitutional:       General: He is not in acute distress  Appearance: He is obese  Comments: Cervical collar on at bedside    Cardiovascular:      Rate and Rhythm: Normal rate and regular rhythm  Pulmonary:      Effort: No respiratory distress  Abdominal:      General: There is no distension  Tenderness: There is no abdominal tenderness  Musculoskeletal:      Right lower leg: Edema present  Left lower leg: Edema present  Neurological:      Mental Status: He is oriented to person, place, and time  Psychiatric:         Mood and Affect: Mood normal           Additional Data:     Labs:  Results from last 7 days   Lab Units 04/04/22  0826 04/03/22  0051 04/03/22  0051   WBC Thousand/uL 7 48   < > 6 73   HEMOGLOBIN g/dL 8 4*   < > 7 9*   HEMATOCRIT % 25 3*   < > 24 8*   PLATELETS Thousands/uL 318   < > 308   NEUTROS PCT %  --   --  65   LYMPHS PCT %  --   --  23   MONOS PCT %  --   --  7   EOS PCT %  --   --  3    < > = values in this interval not displayed  Results from last 7 days   Lab Units 04/03/22  0051   SODIUM mmol/L 138   POTASSIUM mmol/L 4 0   CHLORIDE mmol/L 106   CO2 mmol/L 29   BUN mg/dL 17   CREATININE mg/dL 0 72   ANION GAP mmol/L 3*   CALCIUM mg/dL 8 4   GLUCOSE RANDOM mg/dL 122     Results from last 7 days   Lab Units 04/04/22  0826   INR  1 39*     Results from last 7 days   Lab Units 04/01/22  1707   POC GLUCOSE mg/dl 126               Lines/Drains:  Invasive Devices  Report    Peripherally Inserted Central Catheter Line            PICC Line 68/82/43 Right Basilic 2 days          Drain            Closed/Suction Drain Posterior; Left Neck Bulb 7 Fr  3 days    Closed/Suction Drain Posterior;Right Neck Bulb 7 Fr  3 days                Central Line:  Goal for removal: N/A - Discharging with PICC for IV ABX/medications             Imaging: No pertinent imaging reviewed  Recent Cultures (last 7 days):   Results from last 7 days   Lab Units 04/01/22  1525 03/31/22  1836 03/31/22  1619   BLOOD CULTURE   --   --  No Growth After 4 Days  No Growth After 4 Days     GRAM STAIN RESULT  3+ Disintegrating polys*  1+ Gram positive cocci in pairs* 2+ Polys*  2+ Gram positive cocci in clusters*  --    WOUND CULTURE   --  2+ Growth of Methicillin Resistant Staphylococcus aureus*  --        Last 24 Hours Medication List:   Current Facility-Administered Medications   Medication Dose Route Frequency Provider Last Rate    acetaminophen  975 mg Oral TID PRN Alison Biundo, PA-C      amLODIPine  5 mg Oral Daily Alison Biundo, PA-C      docusate sodium  100 mg Oral BID Alison Biundo, PA-C      DULoxetine  60 mg Oral Daily Alison Biundo, PA-C      flecainide  100 mg Oral BID Alison Biundo, PA-C      gabapentin  100 mg Oral Daily Alison Biundo, PA-C      gabapentin  300 mg Oral HS Alison Biundo, PA-C      heparin (porcine)  3-20 Units/kg/hr (Order-Specific) Intravenous Titrated DEEPTHI Sanders 15 1 Units/kg/hr (04/05/22 0721)    methocarbamol  750 mg Oral Q6H PRN DEEPTHI Sanders      metoprolol succinate  100 mg Oral Daily Alison Biundo, PA-ALTAGRACIA      ondansetron  4 mg Intravenous Q6H PRN Alison Biundo, PA-C      oxyCODONE  2 5 mg Oral Q4H PRN Alison Biundo, PA-ALTAGRACIA      oxyCODONE  5 mg Oral Q4H PRN Alison Biundo, PA-C      polyethylene glycol  17 g Oral Daily PRN DEEPTHI Marvin      potassium chloride  40 mEq Oral Daily Alison Biundo, PA-ALTAGRACIA      pravastatin  40 mg Oral Daily Alison Biundo, PA-C      senna  8 6 mg Oral Daily Alison Biundo, PA-C      tamsulosin  0 4 mg Oral Daily With FABIOLA Pradhan      vancomycin  1,750 mg Intravenous Q12H DEEPTHI Sanders 1,750 mg (04/05/22 2247)        Today, Patient Was Seen By: Petrona aM PA-C    **Please Note: This note may have been constructed using a voice recognition system  **

## 2022-04-05 NOTE — PROGRESS NOTES
Vancomycin IV Pharmacy-to-Dose Consultation    Neville Sherman is a 71 y o  male who is currently receiving Vancomycin IV with management by the Pharmacy Consult service for the treatment of postoperative MRSA cervical wound infection, rule out vertebral osteomyelitis    Assessment/Plan:  The patient was reviewed  Renal function is stable and no signs or symptoms of nephrotoxicity and/or infusion reactions were documented in the chart  Based on todays assessment, continue current vancomycin (day # 6) dosing of 1750mg q12hrs, with a plan for trough to be drawn at 0730 on Wed 4/6  We will continue to follow the patients culture results and clinical progress daily  Mortimer Kitchen Pavlo R  Ph Pharmacist

## 2022-04-05 NOTE — ASSESSMENT & PLAN NOTE
Continue with home dose BB   · Currently on heparin gtt while off home warfarin, bridge when appropriate per primary with goal INR 2-3

## 2022-04-05 NOTE — ASSESSMENT & PLAN NOTE
On coumadin outpatient   Last dose 3/31  · Heparin gtt started on 4/2 per neurosurgery, tentative plan to bridge to coumadin once drains are removed  · Monitor PT/INR

## 2022-04-05 NOTE — PROGRESS NOTES
Progress Note - Infectious Disease   Praveen Hill 71 y o  male MRN: [id number]  Unit/Bed#: [facility] 608-01 Encounter: [id number]      Impression:  1  Postoperative MRSA cervical wound infection R/0 vertebral osteomyelitis S/P posterior cervical a VAC UA shin of postoperative collection and debridement with placement of drains C3-T1 POD 4  2  S/P  anterior cervical diskectomy and fixation fusion C5-6 and C6-7 posterior cervical decompression and instrumented fusion C3-T1 on 22  3  History of PAF on Coumadin  4  Factor 5 Leiden mutation  5  WILL    Recommendations:  Afebrile with normal WBC count when last taken  Wound dressing is dry  1  Final cultures are showing MRSA  2  Continue vancomycin IV with further adjustment of dose by pharmacy  3  Would anticipate at least a four-week IV vancomycin course followed by approximately 4 weeks of p o  Rx   4  Discussed extensively with patient's wife who is at bedside as well as the patient and all questions answered      Antibiotics:  1  Vancomycin 1750 mg q 12 hours IV, day 5 of 28 Rx    Subjective:  Less Left posterior jaw discomfort  Denies fevers, chills, or sweats  Denies nausea, vomiting, or diarrhea  Objective:  Vitals:  Temp:  [97 8 °F (36 6 °C)-98 4 °F (36 9 °C)] 98 4 °F (36 9 °C)  HR:  [60-64] 60  Resp:  [18-19] 18  BP: (116-153)/(50-73) 153/72  SpO2:  [91 %-96 %] 96 %  Temp (24hrs), Av °F (36 7 °C), Min:97 8 °F (36 6 °C), Max:98 4 °F (36 9 °C)  Current: Temperature: 98 4 °F (36 9 °C)    Physical Exam:     General Appearance:  Alert, nontoxic, no acute distress  Vista collar in place  Posterior cervical wound with 2 MARCIN drains and 1+ surrounding erythema  Picture in chart   Throat: Oropharynx moist without lesions  Lips, mucosa, and tongue normal   Neck: Vista collar with posterior cervical wound with staples and retention sutures and 2 MARCIN drains with serosanguineous drainage  Dressing is dry    1+ surrounding erythema as per picture on chart Lungs:   Clear to auscultation bilaterally, no audible wheezes, rhonchi or rales; respirations unlabored   Heart:  Regular rate and rhythm, S1, S2 normal, no murmur, rub or gallop   Abdomen:   Soft, non-tender, surgical scars non-distended, positive bowel sounds  No masses, no organomegaly    No CVA tenderness   Extremities: Extremities normal, atraumatic, no clubbing, cyanosis or edema  PICC line right basilic   Skin: As above, surgical scar  Invasive Devices  Report    Peripherally Inserted Central Catheter Line            PICC Line 30/61/37 Right Basilic 3 days          Drain            Closed/Suction Drain Posterior; Left Neck Bulb 7 Fr  4 days    Closed/Suction Drain Posterior;Right Neck Bulb 7 Fr  4 days                Labs, Imaging, & Other studies:   All pertinent labs were personally reviewed  Results from last 7 days   Lab Units 04/04/22  0826 04/03/22  0051 04/02/22  1041   WBC Thousand/uL 7 48 6 73 8 12   HEMOGLOBIN g/dL 8 4* 7 9* 9 8*   PLATELETS Thousands/uL 318 308 347     Results from last 7 days   Lab Units 04/03/22  0051 04/02/22  1041 04/02/22  1041 03/31/22  1618 03/31/22  1618   SODIUM mmol/L 138  --  137  --  134*   POTASSIUM mmol/L 4 0   < > 4 0   < > 4 0   CHLORIDE mmol/L 106   < > 103   < > 98*   CO2 mmol/L 29   < > 29   < > 31   BUN mg/dL 17   < > 15   < > 13   CREATININE mg/dL 0 72   < > 0 88   < > 0 64   EGFR ml/min/1 73sq m 93   < > 86   < > 98   CALCIUM mg/dL 8 4   < > 10 3*   < > 9 8    < > = values in this interval not displayed  Results from last 7 days   Lab Units 04/01/22  1525 03/31/22  1836 03/31/22  1619   BLOOD CULTURE   --   --  No Growth After 4 Days  No Growth After 4 Days     GRAM STAIN RESULT  3+ Disintegrating polys*  1+ Gram positive cocci in pairs* 2+ Polys*  2+ Gram positive cocci in clusters*  --    WOUND CULTURE   --  2+ Growth of Methicillin Resistant Staphylococcus aureus*  --

## 2022-04-05 NOTE — PLAN OF CARE
Problem: PAIN - ADULT  Goal: Verbalizes/displays adequate comfort level or baseline comfort level  Description: Interventions:  - Encourage patient to monitor pain and request assistance  - Assess pain using appropriate pain scale  - Administer analgesics based on type and severity of pain and evaluate response  - Implement non-pharmacological measures as appropriate and evaluate response  - Consider cultural and social influences on pain and pain management  - Notify physician/advanced practitioner if interventions unsuccessful or patient reports new pain  Outcome: Progressing     Problem: INFECTION - ADULT  Goal: Absence or prevention of progression during hospitalization  Description: INTERVENTIONS:  - Assess and monitor for signs and symptoms of infection  - Monitor lab/diagnostic results  - Monitor all insertion sites, i e  indwelling lines, tubes, and drains  - Monitor endotracheal if appropriate and nasal secretions for changes in amount and color  - [place] appropriate cooling/warming therapies per order  - Administer medications as ordered  - Instruct and encourage patient and family to use good hand hygiene technique  - Identify and instruct in appropriate isolation precautions for identified infection/condition  Outcome: Progressing  Goal: Absence of fever/infection during neutropenic period  Description: INTERVENTIONS:  - Monitor WBC    Outcome: Progressing     Problem: SAFETY ADULT  Goal: Patient will remain free of falls  Description: INTERVENTIONS:  - Educate patient/family on patient safety including physical limitations  - Instruct patient to call for assistance with activity   - Consult OT/PT to assist with strengthening/mobility   - Keep Call bell within reach  - Keep bed low and locked with side rails adjusted as appropriate  - Keep care items and personal belongings within reach  - Initiate and maintain comfort rounds  - Make Fall Risk Sign visible to staff  - Offer Toileting   - Obtain necessary fall risk management equipment  - Apply yellow socks and bracelet for high fall risk patients  - Consider moving patient to room near nurses station  Outcome: Progressing  Goal: Maintain or return to baseline ADL function  Description: INTERVENTIONS:  -  Assess patient's ability to carry out ADLs; assess patient's baseline for ADL function and identify physical deficits which impact ability to perform ADLs (bathing, care of mouth/teeth, toileting, grooming, dressing, etc )  - Assess/evaluate cause of self-care deficits   - Assess range of motion  - Assess patient's mobility; develop plan if impaired  - Assess patient's need for assistive devices and provide as appropriate  - Encourage maximum independence but intervene and supervise when necessary  - Involve family in performance of ADLs  - Assess for home care needs following discharge   - Consider OT consult to assist with ADL evaluation and planning for discharge  - Provide patient education as appropriate  Outcome: Progressing  Goal: Maintains/Returns to pre admission functional level  Description: INTERVENTIONS:  - Perform BMAT or MOVE assessment daily    - Set and communicate daily mobility goal to care team and patient/family/caregiver     - Collaborate with rehabilitation services on mobility goals if consulted  - Out of bed for toileting  - Record patient progress and toleration of activity level   Outcome: Progressing     Problem: DISCHARGE PLANNING  Goal: Discharge to home or other facility with appropriate resources  Description: INTERVENTIONS:  - Identify barriers to discharge w/patient and caregiver  - Arrange for needed discharge resources and transportation as appropriate  - Identify discharge learning needs (meds, wound care, etc )  - Arrange for interpretive services to assist at discharge as needed  - Refer to Case Management Department for coordinating discharge planning if the patient needs post-hospital services based on physician/advanced practitioner order or complex needs related to functional status, cognitive ability, or social support system  Outcome: Progressing     Problem: Knowledge Deficit  Goal: Patient/family/caregiver demonstrates understanding of disease process, treatment plan, medications, and discharge instructions  Description: Complete learning assessment and assess knowledge base    Interventions:  - Provide teaching at level of understanding  - Provide teaching via preferred learning methods  Outcome: Progressing     Problem: Potential for Falls  Goal: Patient will remain free of falls  Description: INTERVENTIONS:  - Educate patient/family on patient safety including physical limitations  - Instruct patient to call for assistance with activity   - Consult OT/PT to assist with strengthening/mobility   - Keep Call bell within reach  - Keep bed low and locked with side rails adjusted as appropriate  - Keep care items and personal belongings within reach  - Initiate and maintain comfort rounds  - Make Fall Risk Sign visible to staff  - Offer [address] necessary fall risk management equipment  - Apply yellow socks and bracelet for high fall risk patients  - Consider moving patient to room near nurses station  Outcome: Progressing     Problem: MOBILITY - ADULT  Goal: Maintain or return to baseline ADL function  Description: INTERVENTIONS:  -  Assess patient's ability to carry out ADLs; assess patient's baseline for ADL function and identify physical deficits which impact ability to perform ADLs (bathing, care of mouth/teeth, toileting, grooming, dressing, etc )  - Assess/evaluate cause of self-care deficits   - Assess range of motion  - Assess patient's mobility; develop plan if impaired  - Assess patient's need for assistive devices and provide as appropriate  - Encourage maximum independence but intervene and supervise when necessary  - Involve family in performance of ADLs  - Assess for home care needs following discharge   - Consider OT consult to assist with ADL evaluation and planning for discharge  - Provide patient education as appropriate  Outcome: Progressing  Goal: Maintains/Returns to pre admission functional level  Description: INTERVENTIONS:  - Perform BMAT or MOVE assessment daily    - Set and communicate daily mobility goal to care team and patient/family/caregiver     - Collaborate with rehabilitation services on mobility goals if consulted  - Out of bed for toileting  - Record patient progress and toleration of activity level   Outcome: Progressing     Problem: Prexisting or High Potential for Compromised Skin Integrity  Goal: Skin integrity is maintained or improved  Description: INTERVENTIONS:  - Identify patients at risk for skin breakdown  - Assess and monitor skin integrity  - Assess and monitor nutrition and hydration status  - Monitor labs   - Assess for incontinence   - Turn and reposition patient  - Assist with mobility/ambulation  - Relieve pressure over bony prominences  - Avoid friction and shearing  - Provide appropriate hygiene as needed including keeping skin clean and dry  - Evaluate need for skin moisturizer/barrier cream  - Collaborate with interdisciplinary team   - Patient/family teaching  - Consider wound care consult   Outcome: Progressing     Problem: MUSCULOSKELETAL - ADULT  Goal: Maintain or return mobility to safest level of function  Description: INTERVENTIONS:  - Assess patient's ability to carry out ADLs; assess patient's baseline for ADL function and identify physical deficits which impact ability to perform ADLs (bathing, care of mouth/teeth, toileting, grooming, dressing, etc )  - Assess/evaluate cause of self-care deficits   - Assess range of motion  - Assess patient's mobility  - Assess patient's need for assistive devices and provide as appropriate  - Encourage maximum independence but intervene and supervise when necessary  - Involve family in performance of ADLs  - Assess for home care needs following discharge   - Consider OT consult to assist with ADL evaluation and planning for discharge  - Provide patient education as appropriate  Outcome: Progressing  Goal: Maintain proper alignment of affected body part  Description: INTERVENTIONS:  - Support, maintain and protect limb and body alignment  - Provide patient/ family with appropriate education  Outcome: Progressing

## 2022-04-05 NOTE — PROGRESS NOTES
[address] [facility]  Progress Note - Praveen Hill 1950, 71 y o  male MRN: [id number]  Unit/Bed#: [facility] 383-27 Encounter: [id number]  Primary Care Provider: Gricel Turk MD   Date and time admitted to hospital: 3/31/2022 10:58 AM    Cervical myelopathy ([address] )  Assessment & Plan  POD 4 posterior cervical evacuation of postoperative collection and debridement with placement of drains C3-T1  · S/p Anterior cervical discectomy and fixation fusion C5/6 and C6/7; Posterior cervical decompression and instrumented fusion C3-T1 with Dr Jocelyne Melendrez on 3/11/22  · Presented as a direct admission from the office on 03/31 with concerns for wound infection    Imaging:   · CT Cervical w/ contrast 3/31/2022: Large posterior soft tissue fluid collection, morphology suspicious for infection as clinically suspected  Study is nondiagnostic for evaluation of the central canal and epidural abscess  MR is better suited intracanalicular evaluation although will be limited by artifact from metal hardware  Plan:   Continue to monitor neurological exam and symptoms   Blood Cx no growth after 4 days    Wound cx 3/31 + MRSA   Intraop cx tissue culture 4/1 + MRSA   [place] collar to remain in place at all times, okay for [place] collar for showering   2 MARCIN drains to FS, will change to gravity today:  o Left 60 mL/24 hours  o Right 100 mL/24 hours  · Hg 8 4, yesterday, continue to monitor   · SLIM and ID consulted - appreciate recommendations  · Patient will require 4 weeks of IV vancomycin followed by 4 weeks of p o  antibiotics  · PICC placed  · Maintain vancomycin 1,750 mg q12h per ID  · Continue to monitor incision  · Pain well controlled on current pain regimen:  · Gabapentin 100mg daily and 300mg at HS  · Tylenol 975 mg TID prn  · Robaxin 750 mg q 6 hours p r n   · Oxycodone 2 5-5mg q 4 hours p r n  For moderate or severe pain  · Bowel regimen:  Colace, Senokot, and MiraLax  · Mobilize with PT/OT  Home with outpatient rehab  · DVT ppx: SCDs, on heparin gtt    Plan of care discussed with MIREILLE Mistry  Neurosurgery will follow as primary, call with any further questions or concerns  At risk for venous thromboembolism (VTE)  Assessment & Plan  History of factor five Leiden along with DVT/PE along with Afib  Last dose coumadin 3/31  On heparin gtt, therapeutic      Factor V Leiden mutation [facility])  Assessment & Plan  Therapeutic on heparin gtt  Plan to bridge to Coumadin once drains are discontinued      Subjective/Objective     Chief Complaint: "I feel okay"    Subjective:  Patient complaining of left-sided neck pain with certain movements, he describes this as a pulling sensation, he states his current pain regimen helps with his pain  He currently denies any neck pain  He denies any radiation of his neck pain into his arms  He also endorses a mild burning to the top of his head when touched which has been ongoing since for surgery which has greatly improved  He denies any headaches, dizziness, blurry vision, chest pain, shortness of breath, abdominal pain, nausea, vomiting, diarrhea, no problems with bowel or bladder, no new weakness or numbness/tingling  He also endorses decreased sensation in his groin which is intact to pinprick  He also reports ongoing urinary urgency  Objective:  Patient comfortably lying in bed with to posterior cervical drains in place to gravity as well as vista collar in place, NAD  I/O       04/03 0701  04/04 0700 04/04 0701  04/05 0700 04/05 0701  04/06 0700    P  O  400 1030     I V  (mL/kg)   250 (2 3)    IV Piggyback       Total Intake(mL/kg) 400 (3 7) 1030 (9 6) 250 (2 3)    Urine (mL/kg/hr) 1500 (0 6) 2945 (1 1) 900 (1 5)    Drains 60 160 25    Stool  0     Total Output 1560 3105 925    Net -8580 -2075 -675           Unmeasured Urine Occurrence  5 x     Unmeasured Stool Occurrence  1 x           Invasive Devices  Report    Peripherally Inserted Central Catheter Line            PICC Line 98/30/95 Right Basilic 3 days          Drain            Closed/Suction Drain Posterior; Left Neck Bulb 7 Fr  3 days    Closed/Suction Drain Posterior;Right Neck Bulb 7 Fr  3 days                Physical Exam:  Vitals: Blood pressure 150/73, pulse 62, temperature 97 8 °F (36 6 °C), resp  rate 19, height 5' 11" (1 803 m), weight 107 kg (235 lb), SpO2 93 %  ,Body mass index is 32 78 kg/m²  General appearance: alert, appears stated age, cooperative and no distress  Head: Normocephalic, without obvious abnormality, atraumatic  Eyes: EOMI, PERRL, conjugate gaze  Neck:  Vista collar in place, updated photo of incision under media, area around incision appears to have more erythema today, Dr Kamilla Knapp assessed incision as well, new dressing placed on incision clean, dry, intact, 2 MARCIN drains to gravity in place, continue to monitor incision  Anterior cervical incision Steri-Strips in place, clean, dry, intact  Lungs: non labored breathing  Heart: regular heart rate  Neurologic:   Mental status: Alert, oriented x3, thought content appropriate, speech is clear, following commands  Cranial nerves: grossly intact (Cranial nerves II-XII)  Sensory: normal to LT in all extremities x4, JPS and DST intact    Sensation to pinprick intact to groin area and upper thighs  Motor: moving all extremities, strength 5/5 throughout except right lower extremity 4+/5 throughout  Reflexes: 2+ and symmetric, no Jiménez's or clonus appreciated  Coordination: finger to nose normal bilaterally, no drift bilaterally            Lab Results:  Results from last 7 days   Lab Units 04/04/22  0826 04/03/22  0051 04/02/22  1041 03/31/22  1618 03/31/22  1618   WBC Thousand/uL 7 48 6 73 8 12   < > 6 86   HEMOGLOBIN g/dL 8 4* 7 9* 9 8*   < > 10 4*   HEMATOCRIT % 25 3* 24 8* 29 9*   < > 32 0*   PLATELETS Thousands/uL 318 308 347   < > 365   NEUTROS PCT %  --  65  --   --  70   MONOS PCT %  --  7  --   --  6    < > = values in this interval not displayed  Results from last 7 days   Lab Units 04/03/22  0051 04/02/22  1041 03/31/22  1618   POTASSIUM mmol/L 4 0 4 0 4 0   CHLORIDE mmol/L 106 103 98*   CO2 mmol/L 29 29 31   BUN mg/dL 17 15 13   CREATININE mg/dL 0 72 0 88 0 64   CALCIUM mg/dL 8 4 10 3* 9 8             Results from last 7 days   Lab Units 04/05/22  0545 04/04/22  0826 04/03/22  2217 04/02/22  1801 04/02/22  1041 04/01/22  1148 04/01/22  1148   INR   --  1 39*  --   --  1 44*  --  2 26*   PTT seconds 98* 79* 75*   < > 36   < > 50*    < > = values in this interval not displayed  No results found for: TROPONINT  ABG:No results found for: PHART, GDY6SVV, PO2ART, ASR7PKJ, W1MOBDIU, BEART, SOURCE    Imaging Studies: I have personally reviewed pertinent reports  and I have personally reviewed pertinent films in PACS    CT spine cervical w contrast    Result Date: 3/31/2022  Impression: Large posterior soft tissue fluid collection, morphology suspicious for infection as clinically suspected  Study is nondiagnostic for evaluation of the central canal and epidural abscess  MR is better suited intracanalicular evaluation although will be limited by artifact from metal hardware  No specific findings of osteomyelitis  Workstation performed: [id number]       EKG, Pathology, and Other Studies: I have personally reviewed pertinent reports        VTE Pharmacologic Prophylaxis: Heparin    VTE Mechanical Prophylaxis: sequential compression device

## 2022-04-05 NOTE — ASSESSMENT & PLAN NOTE
History of cervical myelopathy, status post anterior cervical discectomy and fixation fusion C5/6 and C6/7; Posterior cervical decompression and instrumented fusion C3-T1 with Dr Soumya Hensley on 3/11/22  Discharged to [facility] for rehab on 3/18, then discharged home on 3/29  Noted to have increased redness and drainage at incision site, no improvement after 6 days of Keflex  Referred to ED from OP office visit for further management  · CT cervical spine: Large posterior soft tissue fluid collection, morphology suspicious for infection as clinically suspected  Study is nondiagnostic for evaluation of the central canal and epidural abscess  No specific findings of osteomyelitis     · Admitted under neurosurgery service  · Status post operative washout on 4/1   · ID following and managing antibiotics  · Wound cultures and OR cultures are growing MRSA   · Currently on vancomycin, will need 4 weeks IV antibiotics, PICC line in place  · Blood cultures with no growth to date   · Analgesics as needed   · PT/OT recommending outpatient rehabilitation

## 2022-04-05 NOTE — QUICK NOTE
Spoke with wife in depth over the phone  About plan of care and hospital course  All questions answered at this time

## 2022-04-05 NOTE — ASSESSMENT & PLAN NOTE
Appears to be postop anemia since recent surgery in March, hemoglobin between 9-10 since that time and previously within normal limits   · Hemoglobin down-trending since operative washout, no signs or symptoms of active bleeding   · folate and B12 WNL  · Low Fe level however elevated ferritin  · Continue to monitor and transfuse as needed

## 2022-04-06 LAB
ANION GAP SERPL CALCULATED.3IONS-SCNC: 2 MMOL/L (ref 4–13)
APTT PPP: 32 SECONDS (ref 23–37)
APTT PPP: 33 SECONDS (ref 23–37)
APTT PPP: 68 SECONDS (ref 23–37)
BUN SERPL-MCNC: 10 MG/DL (ref 5–25)
CALCIUM SERPL-MCNC: 8.9 MG/DL (ref 8.3–10.1)
CHLORIDE SERPL-SCNC: 106 MMOL/L (ref 100–108)
CO2 SERPL-SCNC: 31 MMOL/L (ref 21–32)
CREAT SERPL-MCNC: 0.64 MG/DL (ref 0.6–1.3)
GFR SERPL CREATININE-BSD FRML MDRD: 98 ML/MIN/1.73SQ M
GLUCOSE SERPL-MCNC: 107 MG/DL (ref 65–140)
POTASSIUM SERPL-SCNC: 3.8 MMOL/L (ref 3.5–5.3)
SODIUM SERPL-SCNC: 139 MMOL/L (ref 136–145)
VANCOMYCIN TROUGH SERPL-MCNC: 20.8 UG/ML (ref 10–20)

## 2022-04-06 PROCEDURE — 99232 SBSQ HOSP IP/OBS MODERATE 35: CPT | Performed by: INTERNAL MEDICINE

## 2022-04-06 PROCEDURE — RECHECK: Performed by: INTERNAL MEDICINE

## 2022-04-06 PROCEDURE — 80202 ASSAY OF VANCOMYCIN: CPT | Performed by: HOSPITALIST

## 2022-04-06 PROCEDURE — 94760 N-INVAS EAR/PLS OXIMETRY 1: CPT

## 2022-04-06 PROCEDURE — 85730 THROMBOPLASTIN TIME PARTIAL: CPT | Performed by: NEUROLOGICAL SURGERY

## 2022-04-06 PROCEDURE — 99024 POSTOP FOLLOW-UP VISIT: CPT | Performed by: NURSE PRACTITIONER

## 2022-04-06 PROCEDURE — 85730 THROMBOPLASTIN TIME PARTIAL: CPT | Performed by: INTERNAL MEDICINE

## 2022-04-06 RX ORDER — ACETAMINOPHEN 325 MG/1
975 TABLET ORAL 3 TIMES DAILY PRN
Status: DISCONTINUED | OUTPATIENT
Start: 2022-04-06 | End: 2022-04-15 | Stop reason: HOSPADM

## 2022-04-06 RX ORDER — DEXTROSE 10 % IN WATER 10 %
25 INTRAVENOUS SOLUTION INTRAVENOUS ONCE
Status: DISCONTINUED | OUTPATIENT
Start: 2022-04-06 | End: 2022-04-06

## 2022-04-06 RX ADMIN — DOCUSATE SODIUM 100 MG: 100 CAPSULE, LIQUID FILLED ORAL at 08:10

## 2022-04-06 RX ADMIN — VANCOMYCIN HYDROCHLORIDE 1500 MG: 5 INJECTION, POWDER, LYOPHILIZED, FOR SOLUTION INTRAVENOUS at 23:07

## 2022-04-06 RX ADMIN — AMLODIPINE BESYLATE 5 MG: 5 TABLET ORAL at 08:09

## 2022-04-06 RX ADMIN — ACETAMINOPHEN 975 MG: 325 TABLET ORAL at 23:47

## 2022-04-06 RX ADMIN — DULOXETINE HYDROCHLORIDE 60 MG: 60 CAPSULE, DELAYED RELEASE ORAL at 08:09

## 2022-04-06 RX ADMIN — OXYCODONE HYDROCHLORIDE 2.5 MG: 5 TABLET ORAL at 17:40

## 2022-04-06 RX ADMIN — HEPARIN SODIUM 15.1 UNITS/KG/HR: 10000 INJECTION, SOLUTION INTRAVENOUS; SUBCUTANEOUS at 06:14

## 2022-04-06 RX ADMIN — HEPARIN SODIUM 11.8 UNITS/KG/HR: 10000 INJECTION, SOLUTION INTRAVENOUS; SUBCUTANEOUS at 14:03

## 2022-04-06 RX ADMIN — METOPROLOL SUCCINATE 100 MG: 100 TABLET, EXTENDED RELEASE ORAL at 08:09

## 2022-04-06 RX ADMIN — POTASSIUM CHLORIDE 40 MEQ: 20 TABLET, EXTENDED RELEASE ORAL at 08:09

## 2022-04-06 RX ADMIN — GABAPENTIN 100 MG: 100 CAPSULE ORAL at 08:09

## 2022-04-06 RX ADMIN — FLECAINIDE ACETATE 100 MG: 100 TABLET ORAL at 08:11

## 2022-04-06 RX ADMIN — PRAVASTATIN SODIUM 40 MG: 40 TABLET ORAL at 08:09

## 2022-04-06 RX ADMIN — TAMSULOSIN HYDROCHLORIDE 0.4 MG: 0.4 CAPSULE ORAL at 17:28

## 2022-04-06 RX ADMIN — GABAPENTIN 300 MG: 300 CAPSULE ORAL at 21:27

## 2022-04-06 RX ADMIN — VANCOMYCIN HYDROCHLORIDE 1750 MG: 5 INJECTION, POWDER, LYOPHILIZED, FOR SOLUTION INTRAVENOUS at 08:23

## 2022-04-06 RX ADMIN — FLECAINIDE ACETATE 100 MG: 100 TABLET ORAL at 17:28

## 2022-04-06 NOTE — PROGRESS NOTES
[address] [facility]  Progress Note - Keysha Cox 1950, 71 y o  male MRN: [id number]  Unit/Bed#: [facility] 275-96 Encounter: [id number]  Primary Care Provider: Dung George MD   Date and time admitted to hospital: 3/31/2022 10:58 AM    Cervical myelopathy ([address] )  Assessment & Plan  POD 5 posterior cervical evacuation of postoperative collection and debridement with placement of drains C3-T1  · S/p Anterior cervical discectomy and fixation fusion C5/6 and C6/7; Posterior cervical decompression and instrumented fusion C3-T1 with Dr Ace Felton on 3/11/22  · Presented as a direct admission from the office on 03/31 with concerns for wound infection    Imaging:   · CT Cervical w/ contrast 3/31/2022: Large posterior soft tissue fluid collection, morphology suspicious for infection as clinically suspected  Study is nondiagnostic for evaluation of the central canal and epidural abscess  MR is better suited intracanalicular evaluation although will be limited by artifact from metal hardware  Plan:   Continue to monitor neurological exam and symptoms   Blood Cx no growth after 5 days    Wound cx 3/31 + MRSA   Intraop cx tissue culture 4/1 + MRSA   [place] collar to remain in place at all times, okay for [place] collar for showering   2 MARCIN drains to gravity:  o Left 34 mL/24 hours  o Right 23 mL/24 hours, will pull drain afternoon after heparin drip has been on hold for 4 hours then plan to restart heparin ACS protocol 1 hour after MARCIN pulled  · SLIM and ID consulted - appreciate recommendations  · Patient will require 4 weeks of IV vancomycin followed by 4 weeks of p o  antibiotics  · PICC placed  · Maintain vancomycin 1,750 mg q12h per ID  · Continue to monitor incision  · Pain well controlled on current pain regimen:  · Gabapentin 100mg daily and 300mg at HS  · Tylenol 975 mg TID prn  · Robaxin 750 mg q 6 hours p r n   · Oxycodone 2 5-5mg q 4 hours p r n   For moderate or severe pain  · Bowel regimen:  Colace, Senokot, and MiraLax  · Mobilize with PT/OT  Home with outpatient rehab  · DVT ppx: SCDs, on heparin gtt  · Attempted to call wife Noe Chew with update, she did not  message left  Plan of care discussed with MIREILLE Higgins  Neurosurgery will follow as primary, call with any further questions or concerns  Factor V Leiden mutation [facility])  Assessment & Plan  Therapeutic on heparin gtt  Plan to bridge to Coumadin once drains are discontinued    Subjective/Objective     Chief Complaint: "I am okay"    Subjective:  Patient continues to complain of some left-sided neck pain with certain movements which he describes as a pulling sensation and more like muscle pain  He states current pain regimen helps with his pain  Denies any current neck pain  He denies any radiation of his neck pain into his arms  He also reports sometimes with chewing his left neck will bother him  He continues to endorse mild burning to the top of his head when touched which has been ongoing since surgery which has greatly improved  Patient does endorse one episode of diarrhea last night  He denies any headaches, dizziness, blurry vision, chest pain, shortness of breath, abdominal pain, nausea, vomiting, no new problems with bowel or bladder, no new weakness or numbness/tingling  Objective:  Patient comfortably lying in bed with 2 MARCIN drains in place to gravity as well as vista collar in place, NAD  I/O       04/04 0701  04/05 0700 04/05 0701  04/06 0700 04/06 0701  04/07 0700    P  O  1030 1080 300    I V  (mL/kg)  250 (2 3)     IV Piggyback  500     Total Intake(mL/kg) 1030 (9 6) 1830 (17 1) 300 (2 8)    Urine (mL/kg/hr) 2945 (1 1) 1350 (0 5) 200 (0 6)    Drains 160 34 23    Stool 0 0     Total Output 3105 1384 223    Net -2075 +446 +77           Unmeasured Urine Occurrence 5 x 1 x     Unmeasured Stool Occurrence 1 x 1 x           Invasive Devices  Report    Peripherally Inserted Central Catheter Line            PICC Line 28/21/49 Right Basilic 3 days          Drain            Closed/Suction Drain Posterior; Left Neck Bulb 7 Fr  4 days    Closed/Suction Drain Posterior;Right Neck Bulb 7 Fr  4 days                Physical Exam:  Vitals: Blood pressure 132/64, pulse 60, temperature 97 9 °F (36 6 °C), resp  rate 18, height 5' 11" (1 803 m), weight 107 kg (235 lb), SpO2 95 %  ,Body mass index is 32 78 kg/m²  General appearance: alert, appears stated age, cooperative and no distress  Head: Normocephalic, without obvious abnormality, atraumatic  Eyes: EOMI, PERRL, conjugate gaze  Neck:  [place] collar in place, area around incision appears to have some erythema, incision clean, dry, intact  Two MARCIN drains to gravity in place, continue to monitor incision  Anterior cervical incision Steri-Strips in place clean, dry, intact  Lungs: non labored breathing  Heart: regular heart rate  Neurologic:   Mental status: Alert, oriented x3, thought content appropriate, speech is clear, following commands  Cranial nerves: grossly intact (Cranial nerves II-XII)  Sensory: normal to light touch in all extremities x4, JPS and DST intact  Motor: moving all extremities, strength 5/5 throughout except right lower extremity 4+/5 throughout  Reflexes: 2+ and symmetric, no Jiménez's noted but bilateral clonus noted  Coordination: finger to nose normal bilaterally, no drift bilaterally      Lab Results:  Results from last 7 days   Lab Units 04/04/22  0826 04/03/22 0051 04/02/22  1041 03/31/22  1618 03/31/22  1618   WBC Thousand/uL 7 48 6 73 8 12   < > 6 86   HEMOGLOBIN g/dL 8 4* 7 9* 9 8*   < > 10 4*   HEMATOCRIT % 25 3* 24 8* 29 9*   < > 32 0*   PLATELETS Thousands/uL 318 308 347   < > 365   NEUTROS PCT %  --  65  --   --  70   MONOS PCT %  --  7  --   --  6    < > = values in this interval not displayed       Results from last 7 days   Lab Units 04/03/22 0051 04/02/22  1041 03/31/22  1618   POTASSIUM mmol/L 4 0 4 0 4 0   CHLORIDE mmol/L 106 103 98*   CO2 mmol/L 29 29 31   BUN mg/dL 17 15 13   CREATININE mg/dL 0 72 0 88 0 64   CALCIUM mg/dL 8 4 10 3* 9 8             Results from last 7 days   Lab Units 04/06/22  0626 04/05/22  1957 04/05/22  1355 04/05/22  0545 04/04/22  0826 04/02/22  1801 04/02/22  1041 04/01/22  1148 04/01/22  1148   INR   --   --   --   --  1 39*  --  1 44*  --  2 26*   PTT seconds 68* 61* 81*   < > 79*   < > 36   < > 50*    < > = values in this interval not displayed  No results found for: TROPONINT  ABG:No results found for: PHART, XJO4QQI, PO2ART, NMA7OEQ, X9TKLGDR, BEART, SOURCE    Imaging Studies: I have personally reviewed pertinent reports  and I have personally reviewed pertinent films in PACS    CT spine cervical w contrast    Result Date: 3/31/2022  Impression: Large posterior soft tissue fluid collection, morphology suspicious for infection as clinically suspected  Study is nondiagnostic for evaluation of the central canal and epidural abscess  MR is better suited intracanalicular evaluation although will be limited by artifact from metal hardware  No specific findings of osteomyelitis  Workstation performed: [id number]       EKG, Pathology, and Other Studies: I have personally reviewed pertinent reports        VTE Pharmacologic Prophylaxis: Heparin    VTE Mechanical Prophylaxis: sequential compression device

## 2022-04-06 NOTE — ASSESSMENT & PLAN NOTE
On coumadin outpatient (home regimen 2 5 mg daily except Wed/Sat takes 5 mg daily)    Last dose 3/31  · Heparin gtt started on 4/2 per neurosurgery, tentative plan to bridge to coumadin once drains are removed  · 1 drain to be removed on 4/6, other remains   · Monitor PT/INR

## 2022-04-06 NOTE — OCCUPATIONAL THERAPY NOTE
OT Discharge Summary    Pt made good progress during their stay on the [facility] following admission S/p anterior cervical discectomy and fixation fusion C5-6 and C6-7, posterior cervical decompression and instrumented fusion C3-T1 2* cervical myopathy  Pt progress to independent for overall ADLs (PIERRE for bathing and Min A for UB dressing-C-Collar management) and independent for functional transfers with RW  Extensive FT was completed prior to D/C with pt's wife to review functional performance, A required, and C Collar management  At this time, recommending no additional OT needs and required no additional DME prior to D/C       Sreedhar Richards MS, OTR/L

## 2022-04-06 NOTE — PLAN OF CARE
Problem: PAIN - ADULT  Goal: Verbalizes/displays adequate comfort level or baseline comfort level  Description: Interventions:  - Encourage patient to monitor pain and request assistance  - Assess pain using appropriate pain scale  - Administer analgesics based on type and severity of pain and evaluate response  - Implement non-pharmacological measures as appropriate and evaluate response  - Consider cultural and social influences on pain and pain management  - Notify physician/advanced practitioner if interventions unsuccessful or patient reports new pain  Outcome: Progressing     Problem: INFECTION - ADULT  Goal: Absence or prevention of progression during hospitalization  Description: INTERVENTIONS:  - Assess and monitor for signs and symptoms of infection  - Monitor lab/diagnostic results  - Monitor all insertion sites, i e  indwelling lines, tubes, and drains  - Monitor endotracheal if appropriate and nasal secretions for changes in amount and color  - [place] appropriate cooling/warming therapies per order  - Administer medications as ordered  - Instruct and encourage patient and family to use good hand hygiene technique  - Identify and instruct in appropriate isolation precautions for identified infection/condition  Outcome: Progressing  Goal: Absence of fever/infection during neutropenic period  Description: INTERVENTIONS:  - Monitor WBC    Outcome: Progressing     Problem: SAFETY ADULT  Goal: Patient will remain free of falls  Description: INTERVENTIONS:  - Educate patient/family on patient safety including physical limitations  - Instruct patient to call for assistance with activity   - Consult OT/PT to assist with strengthening/mobility   - Keep Call bell within reach  - Keep bed low and locked with side rails adjusted as appropriate  - Keep care items and personal belongings within reach  - Initiate and maintain comfort rounds  - Make Fall Risk Sign visible to staff  - Offer Toileting   - Obtain necessary fall risk management equipment  - Apply yellow socks and bracelet for high fall risk patients  - Consider moving patient to room near nurses station  Outcome: Progressing  Goal: Maintain or return to baseline ADL function  Description: INTERVENTIONS:  -  Assess patient's ability to carry out ADLs; assess patient's baseline for ADL function and identify physical deficits which impact ability to perform ADLs (bathing, care of mouth/teeth, toileting, grooming, dressing, etc )  - Assess/evaluate cause of self-care deficits   - Assess range of motion  - Assess patient's mobility; develop plan if impaired  - Assess patient's need for assistive devices and provide as appropriate  - Encourage maximum independence but intervene and supervise when necessary  - Involve family in performance of ADLs  - Assess for home care needs following discharge   - Consider OT consult to assist with ADL evaluation and planning for discharge  - Provide patient education as appropriate  Outcome: Progressing  Goal: Maintains/Returns to pre admission functional level  Description: INTERVENTIONS:  - Perform BMAT or MOVE assessment daily    - Set and communicate daily mobility goal to care team and patient/family/caregiver     - Collaborate with rehabilitation services on mobility goals if consulted  - Out of bed for toileting  - Record patient progress and toleration of activity level   Outcome: Progressing     Problem: DISCHARGE PLANNING  Goal: Discharge to home or other facility with appropriate resources  Description: INTERVENTIONS:  - Identify barriers to discharge w/patient and caregiver  - Arrange for needed discharge resources and transportation as appropriate  - Identify discharge learning needs (meds, wound care, etc )  - Arrange for interpretive services to assist at discharge as needed  - Refer to Case Management Department for coordinating discharge planning if the patient needs post-hospital services based on physician/advanced practitioner order or complex needs related to functional status, cognitive ability, or social support system  Outcome: Progressing     Problem: Knowledge Deficit  Goal: Patient/family/caregiver demonstrates understanding of disease process, treatment plan, medications, and discharge instructions  Description: Complete learning assessment and assess knowledge base    Interventions:  - Provide teaching at level of understanding  - Provide teaching via preferred learning methods  Outcome: Progressing     Problem: Potential for Falls  Goal: Patient will remain free of falls  Description: INTERVENTIONS:  - Educate patient/family on patient safety including physical limitations  - Instruct patient to call for assistance with activity   - Consult OT/PT to assist with strengthening/mobility   - Keep Call bell within reach  - Keep bed low and locked with side rails adjusted as appropriate  - Keep care items and personal belongings within reach  - Initiate and maintain comfort rounds  - Make Fall Risk Sign visible to staff  - Offer [address] necessary fall risk management equipment  - Apply yellow socks and bracelet for high fall risk patients  - Consider moving patient to room near nurses station  Outcome: Progressing     Problem: MOBILITY - ADULT  Goal: Maintain or return to baseline ADL function  Description: INTERVENTIONS:  -  Assess patient's ability to carry out ADLs; assess patient's baseline for ADL function and identify physical deficits which impact ability to perform ADLs (bathing, care of mouth/teeth, toileting, grooming, dressing, etc )  - Assess/evaluate cause of self-care deficits   - Assess range of motion  - Assess patient's mobility; develop plan if impaired  - Assess patient's need for assistive devices and provide as appropriate  - Encourage maximum independence but intervene and supervise when necessary  - Involve family in performance of ADLs  - Assess for home care needs following discharge   - Consider OT consult to assist with ADL evaluation and planning for discharge  - Provide patient education as appropriate  Outcome: Progressing  Goal: Maintains/Returns to pre admission functional level  Description: INTERVENTIONS:  - Perform BMAT or MOVE assessment daily    - Set and communicate daily mobility goal to care team and patient/family/caregiver     - Collaborate with rehabilitation services on mobility goals if consulted  - Out of bed for toileting  - Record patient progress and toleration of activity level   Outcome: Progressing     Problem: Prexisting or High Potential for Compromised Skin Integrity  Goal: Skin integrity is maintained or improved  Description: INTERVENTIONS:  - Identify patients at risk for skin breakdown  - Assess and monitor skin integrity  - Assess and monitor nutrition and hydration status  - Monitor labs   - Assess for incontinence   - Turn and reposition patient  - Assist with mobility/ambulation  - Relieve pressure over bony prominences  - Avoid friction and shearing  - Provide appropriate hygiene as needed including keeping skin clean and dry  - Evaluate need for skin moisturizer/barrier cream  - Collaborate with interdisciplinary team   - Patient/family teaching  - Consider wound care consult   Outcome: Progressing     Problem: MUSCULOSKELETAL - ADULT  Goal: Maintain or return mobility to safest level of function  Description: INTERVENTIONS:  - Assess patient's ability to carry out ADLs; assess patient's baseline for ADL function and identify physical deficits which impact ability to perform ADLs (bathing, care of mouth/teeth, toileting, grooming, dressing, etc )  - Assess/evaluate cause of self-care deficits   - Assess range of motion  - Assess patient's mobility  - Assess patient's need for assistive devices and provide as appropriate  - Encourage maximum independence but intervene and supervise when necessary  - Involve family in performance of ADLs  - Assess for home care needs following discharge   - Consider OT consult to assist with ADL evaluation and planning for discharge  - Provide patient education as appropriate  Outcome: Progressing  Goal: Maintain proper alignment of affected body part  Description: INTERVENTIONS:  - Support, maintain and protect limb and body alignment  - Provide patient/ family with appropriate education  Outcome: Progressing

## 2022-04-06 NOTE — PROGRESS NOTES
Progress Note - Infectious Disease   Marj Valerio 71 y o  male MRN: [id number]  Unit/Bed#: [facility] 608-01 Encounter: [id number]      Impression:  1  Postoperative MRSA cervical wound infection R/0 vertebral osteomyelitis S/P posterior cervical a VAC UA shin of postoperative collection and debridement with placement of drains C3-T1 POD 5  2  S/P  anterior cervical diskectomy and fixation fusion C5-6 and C6-7 posterior cervical decompression and instrumented fusion C3-T1 on 22  3  History of PAF on Coumadin  4  Factor 5 Leiden mutation  5  WILL    Recommendations:  Afebrile with normal WBC count when last taken  Wound dressing is dry and pictures today show decreased inflammation without purulent discharge  One drain was removed  1  Final cultures are showing MRSA  2  Continue vancomycin IV with further adjustment of dose by pharmacy  3  Would anticipate at least a four-week IV vancomycin course followed by approximately 4 weeks of p o  Rx   4  Discussed with patient's wife who is at bedside as well as the patient and all questions answered      Antibiotics:  1  Vancomycin 1500 mg q 12 hours IV, day 6 of 28 Rx    Subjective:  Discomfort minimal  Denies fevers, chills, or sweats  Denies nausea, vomiting, or diarrhea  Objective:  Vitals:  Temp:  [97 9 °F (36 6 °C)-98 3 °F (36 8 °C)] 98 2 °F (36 8 °C)  HR:  [58-62] 59  Resp:  [17-19] 19  BP: (131-165)/(64-78) 152/74  SpO2:  [95 %-96 %] 95 %  Temp (24hrs), Av 1 °F (36 7 °C), Min:97 9 °F (36 6 °C), Max:98 3 °F (36 8 °C)  Current: Temperature: 98 2 °F (36 8 °C)    Physical Exam:     General Appearance:  Alert, nontoxic, no acute distress  [place] collar in place  Posterior cervical wound with 1 MARCIN drain and 1+ surrounding erythema  Picture in chart   Throat: Oropharynx moist without lesions    Lips, mucosa, and tongue normal   Neck: [place] collar with posterior cervical wound with staples and retention sutures and 1 MARCIN drain with scanty serosanguineous drainage  Dressing is dry  1+ surrounding erythema as per picture on chart   Lungs:   Clear to auscultation bilaterally, no audible wheezes, rhonchi or rales; respirations unlabored   Heart:  Regular rate and rhythm, S1, S2 normal, no murmur, rub or gallop   Abdomen:   Soft, non-tender, surgical scars non-distended, positive bowel sounds  No masses, no organomegaly    No CVA tenderness   Extremities: Extremities normal, atraumatic, no clubbing, cyanosis or edema  PICC line right basilic   Skin: As above, surgical scar  Invasive Devices  Report    Peripherally Inserted Central Catheter Line            PICC Line 65/19/14 Right Basilic 4 days          Drain            Closed/Suction Drain Posterior; Left Neck Bulb 7 Fr  5 days    Closed/Suction Drain Posterior;Right Neck Bulb 7 Fr  5 days                Labs, Imaging, & Other studies:   All pertinent labs were personally reviewed  Results from last 7 days   Lab Units 04/04/22  0826 04/03/22  0051 04/02/22  1041   WBC Thousand/uL 7 48 6 73 8 12   HEMOGLOBIN g/dL 8 4* 7 9* 9 8*   PLATELETS Thousands/uL 318 308 347     Results from last 7 days   Lab Units 04/05/22  1359 04/03/22  0051 04/03/22  0051 04/02/22  1041 04/02/22  1041   SODIUM mmol/L 139  --  138  --  137   POTASSIUM mmol/L 3 8   < > 4 0   < > 4 0   CHLORIDE mmol/L 106   < > 106   < > 103   CO2 mmol/L 31   < > 29   < > 29   BUN mg/dL 10   < > 17   < > 15   CREATININE mg/dL 0 64   < > 0 72   < > 0 88   EGFR ml/min/1 73sq m 98   < > 93   < > 86   CALCIUM mg/dL 8 9   < > 8 4   < > 10 3*    < > = values in this interval not displayed  Results from last 7 days   Lab Units 04/01/22  1525 03/31/22  1836 03/31/22  1619   BLOOD CULTURE   --   --  No Growth After 5 Days  No Growth After 5 Days     GRAM STAIN RESULT  3+ Disintegrating polys*  1+ Gram positive cocci in pairs* 2+ Polys*  2+ Gram positive cocci in clusters*  --    WOUND CULTURE   --  2+ Growth of Methicillin Resistant Staphylococcus aureus*  --

## 2022-04-06 NOTE — PROGRESS NOTES
[address] [facility]  Progress Note - Opal Gonzalez 1950, 71 y o  male MRN: [id number]  Unit/Bed#: [facility] 806-49 Encounter: [id number]  Primary Care Provider: Renny Aguilar MD   Date and time admitted to hospital: 3/31/2022 10:58 AM    Patient not physically seen nor examined but d/w Latasha Barajas of Nsx team  Will continue to follow closely  * Surgical site infection  Assessment & Plan  History of cervical myelopathy, status post anterior cervical discectomy and fixation fusion C5/6 and C6/7; Posterior cervical decompression and instrumented fusion C3-T1 with Dr Elmira Selby on 3/11/22  Discharged to [facility] for rehab on 3/18, then discharged home on 3/29  Noted to have increased redness and drainage at incision site, no improvement after 6 days of Keflex  Referred to ED from OP office visit for further management  · CT cervical spine: Large posterior soft tissue fluid collection, morphology suspicious for infection as clinically suspected  Study is nondiagnostic for evaluation of the central canal and epidural abscess  No specific findings of osteomyelitis     · Admitted under neurosurgery service  · Status post operative washout on 4/1   · ID following and managing antibiotics  · Wound cultures and OR cultures are growing MRSA   · Currently on vancomycin, will need 4 weeks IV antibiotics, PICC line in place  · Blood cultures with no growth to date   · Analgesics as needed   · PT/OT recommending outpatient rehabilitation     Cervical myelopathy [facility])  Assessment & Plan  Status post cervical spinal fusion on 3/11/22 as above   · See related plan     Anemia  Assessment & Plan  Appears to be postop anemia since recent surgery in March, hemoglobin between 9-10 since that time and previously within normal limits   · Hemoglobin down-trending since operative washout, no signs or symptoms of active bleeding   · folate and B12 WNL  · Low Fe level however elevated ferritin  · Continue to monitor and transfuse as needed     Mixed hyperlipidemia  Assessment & Plan  · Continue statin     Paroxysmal A-fib (HCC)  Assessment & Plan  Continue with home dose BB   · Currently on heparin gtt while off home warfarin, bridge when appropriate per primary with goal INR 2-3    Factor V Leiden mutation [facility])  Assessment & Plan  On coumadin outpatient (home regimen 2 5 mg daily except Wed/Sat takes 5 mg daily)    Last dose 3/31  · Heparin gtt started on 4/2 per neurosurgery, tentative plan to bridge to coumadin once drains are removed  · 1 drain to be removed on 4/6, other remains   · Monitor PT/INR    WILL (obstructive sleep apnea)  Assessment & Plan  · CPAP qhs     Essential hypertension  Assessment & Plan  BP acceptable, monitor routinely   · Continue home dose Norvasc, metoprolol       Sherif Peralta PA-C

## 2022-04-06 NOTE — ASSESSMENT & PLAN NOTE
POD 5 posterior cervical evacuation of postoperative collection and debridement with placement of drains C3-T1  · S/p Anterior cervical discectomy and fixation fusion C5/6 and C6/7; Posterior cervical decompression and instrumented fusion C3-T1 with Dr Flynn Rangel on 3/11/22  · Presented as a direct admission from the office on 03/31 with concerns for wound infection    Imaging:   · CT Cervical w/ contrast 3/31/2022: Large posterior soft tissue fluid collection, morphology suspicious for infection as clinically suspected  Study is nondiagnostic for evaluation of the central canal and epidural abscess  MR is better suited intracanalicular evaluation although will be limited by artifact from metal hardware  Plan:   Continue to monitor neurological exam and symptoms   Blood Cx no growth after 5 days    Wound cx 3/31 + MRSA   Intraop cx tissue culture 4/1 + MRSA   [place] collar to remain in place at all times, okay for [place] collar for showering   2 MARCIN drains to gravity:  o Left 34 mL/24 hours  o Right 23 mL/24 hours, will pull drain afternoon after heparin drip has been on hold for 4 hours then plan to restart heparin ACS protocol 1 hour after MARCIN pulled  · SLIM and ID consulted - appreciate recommendations  · Patient will require 4 weeks of IV vancomycin followed by 4 weeks of p o  antibiotics  · PICC placed  · Maintain vancomycin 1,750 mg q12h per ID  · Continue to monitor incision  · Pain well controlled on current pain regimen:  · Gabapentin 100mg daily and 300mg at HS  · Tylenol 975 mg TID prn  · Robaxin 750 mg q 6 hours p r n   · Oxycodone 2 5-5mg q 4 hours p r n  For moderate or severe pain  · Bowel regimen:  Colace, Senokot, and MiraLax  · Mobilize with PT/OT  Home with outpatient rehab  · DVT ppx: SCDs, on heparin gtt  · Attempted to call wife Bronwyn Wagner with update, she did not  message left  Plan of care discussed with MIREILLE Nixon      Neurosurgery will follow as primary, call with any further questions or concerns

## 2022-04-06 NOTE — QUICK NOTE
Removed R MARCIN drain without difficulty, one suture tied down in place, patient tolerated well  Updated incision picture uploaded under media  Will restart ACS heparin drip 1 hour after MARCIN removal per Internal Medicine, Neurosurgery will continue follow as primary team, call with any further questions or concerns

## 2022-04-06 NOTE — PROGRESS NOTES
Vancomycin Assessment    Bg Thomas is a 71 y o  male who is currently receiving vancomycin 1750mg iv q12h for osteomyelitis bone/joint   Relevant clinical data and objective history reviewed:  Creatinine   Date Value Ref Range Status   04/05/2022 0 64 0 60 - 1 30 mg/dL Final     Comment:     Standardized to IDMS reference method   04/03/2022 0 72 0 60 - 1 30 mg/dL Final     Comment:     Standardized to IDMS reference method   04/02/2022 0 88 0 60 - 1 30 mg/dL Final     Comment:     Standardized to IDMS reference method   10/25/2017 1 24 0 76 - 1 27 mg/dL Final   11/03/2016 1 11 0 76 - 1 27 mg/dL Final   01/23/2015 1 20 0 60 - 1 30 mg/dL Final     Comment:     Standardized to IDMS reference method     /74 (BP Location: Left arm)   Pulse 59   Temp 98 2 °F (36 8 °C)   Resp 19   Ht 5' 11" (1 803 m)   Wt 107 kg (235 lb)   SpO2 95%   BMI 32 78 kg/m²   I/O last 3 completed shifts: In: 2842 [P O :1080; I V :250; IV Piggyback:500]  Out: 1934 [Urine:1850; Drains:84]  Lab Results   Component Value Date/Time    BUN 10 04/05/2022 01:59 PM    BUN 17 11/02/2021 01:02 PM    WBC 7 48 04/04/2022 08:26 AM    WBC 8 43 01/23/2015 04:40 AM    HGB 8 4 (L) 04/04/2022 08:26 AM    HGB 12 9 01/23/2015 04:40 AM    HCT 25 3 (L) 04/04/2022 08:26 AM    HCT 39 5 01/23/2015 04:40 AM    MCV 92 04/04/2022 08:26 AM    MCV 93 01/23/2015 04:40 AM     04/04/2022 08:26 AM     01/23/2015 04:40 AM     Temp Readings from Last 3 Encounters:   04/06/22 98 2 °F (36 8 °C)   03/31/22 (!) 96 4 °F (35 8 °C) (Tympanic)   03/29/22 97 7 °F (36 5 °C) (Oral)     Vancomycin Days of Therapy: 7    Assessment/Plan  The patient is currently on vancomycin utilizing scheduled dosing  Baseline risks associated with therapy include: advanced age    The patient is receiving 1750mg iv q12h with the most recent vancomycin level being at steady-state and supratherapeutic based on a goal of 15-20 (appropriate for most indications) ; therefore, after clinical evaluation will be changed to 1500mg iv q12h   Pharmacy will continue to follow closely for s/sx of nephrotoxicity, infusion reactions, and appropriateness of therapy  BMP and CBC will be ordered per protocol  Plan for trough as patient approaches steady state, prior to the 4th  dose at approximately 1130 on 4/8  Pharmacy will continue to follow the patients culture results and clinical progress daily      Gregoria Anglin, Pharmacist

## 2022-04-06 NOTE — ASSESSMENT & PLAN NOTE
History of cervical myelopathy, status post anterior cervical discectomy and fixation fusion C5/6 and C6/7; Posterior cervical decompression and instrumented fusion C3-T1 with Dr Flynn Rangel on 3/11/22  Discharged to [facility] for rehab on 3/18, then discharged home on 3/29  Noted to have increased redness and drainage at incision site, no improvement after 6 days of Keflex  Referred to ED from OP office visit for further management  · CT cervical spine: Large posterior soft tissue fluid collection, morphology suspicious for infection as clinically suspected  Study is nondiagnostic for evaluation of the central canal and epidural abscess  No specific findings of osteomyelitis     · Admitted under neurosurgery service  · Status post operative washout on 4/1   · ID following and managing antibiotics  · Wound cultures and OR cultures are growing MRSA   · Currently on vancomycin, will need 4 weeks IV antibiotics, PICC line in place  · Blood cultures with no growth to date   · Analgesics as needed   · PT/OT recommending outpatient rehabilitation

## 2022-04-07 ENCOUNTER — TELEPHONE (OUTPATIENT)
Dept: HEMATOLOGY ONCOLOGY | Facility: CLINIC | Age: 72
End: 2022-04-07

## 2022-04-07 LAB
ANION GAP SERPL CALCULATED.3IONS-SCNC: 3 MMOL/L (ref 4–13)
APTT PPP: 37 SECONDS (ref 23–37)
APTT PPP: 99 SECONDS (ref 23–37)
APTT PPP: >210 SECONDS (ref 23–37)
BUN SERPL-MCNC: 9 MG/DL (ref 5–25)
CALCIUM SERPL-MCNC: 8.6 MG/DL (ref 8.3–10.1)
CHLORIDE SERPL-SCNC: 106 MMOL/L (ref 100–108)
CO2 SERPL-SCNC: 30 MMOL/L (ref 21–32)
CREAT SERPL-MCNC: 0.74 MG/DL (ref 0.6–1.3)
GFR SERPL CREATININE-BSD FRML MDRD: 92 ML/MIN/1.73SQ M
GLUCOSE SERPL-MCNC: 93 MG/DL (ref 65–140)
INR PPP: 1.37 (ref 0.84–1.19)
POTASSIUM SERPL-SCNC: 3.6 MMOL/L (ref 3.5–5.3)
PROTHROMBIN TIME: 16.2 SECONDS (ref 11.6–14.5)
SODIUM SERPL-SCNC: 139 MMOL/L (ref 136–145)
VANCOMYCIN TROUGH SERPL-MCNC: 18.2 UG/ML (ref 10–20)

## 2022-04-07 PROCEDURE — 85730 THROMBOPLASTIN TIME PARTIAL: CPT | Performed by: NEUROLOGICAL SURGERY

## 2022-04-07 PROCEDURE — 99232 SBSQ HOSP IP/OBS MODERATE 35: CPT | Performed by: INTERNAL MEDICINE

## 2022-04-07 PROCEDURE — 80202 ASSAY OF VANCOMYCIN: CPT | Performed by: NEUROLOGICAL SURGERY

## 2022-04-07 PROCEDURE — 99024 POSTOP FOLLOW-UP VISIT: CPT | Performed by: NEUROLOGICAL SURGERY

## 2022-04-07 PROCEDURE — 85610 PROTHROMBIN TIME: CPT | Performed by: INTERNAL MEDICINE

## 2022-04-07 PROCEDURE — 80048 BASIC METABOLIC PNL TOTAL CA: CPT | Performed by: NURSE PRACTITIONER

## 2022-04-07 RX ADMIN — METOPROLOL SUCCINATE 100 MG: 100 TABLET, EXTENDED RELEASE ORAL at 08:18

## 2022-04-07 RX ADMIN — VANCOMYCIN HYDROCHLORIDE 1250 MG: 10 INJECTION, POWDER, LYOPHILIZED, FOR SOLUTION INTRAVENOUS at 22:30

## 2022-04-07 RX ADMIN — ACETAMINOPHEN 975 MG: 325 TABLET ORAL at 14:49

## 2022-04-07 RX ADMIN — OXYCODONE HYDROCHLORIDE 5 MG: 5 TABLET ORAL at 22:03

## 2022-04-07 RX ADMIN — PRAVASTATIN SODIUM 40 MG: 40 TABLET ORAL at 08:18

## 2022-04-07 RX ADMIN — GABAPENTIN 300 MG: 300 CAPSULE ORAL at 22:03

## 2022-04-07 RX ADMIN — VANCOMYCIN HYDROCHLORIDE 1500 MG: 5 INJECTION, POWDER, LYOPHILIZED, FOR SOLUTION INTRAVENOUS at 12:19

## 2022-04-07 RX ADMIN — DULOXETINE HYDROCHLORIDE 60 MG: 60 CAPSULE, DELAYED RELEASE ORAL at 08:18

## 2022-04-07 RX ADMIN — METHOCARBAMOL TABLETS 750 MG: 750 TABLET, COATED ORAL at 22:03

## 2022-04-07 RX ADMIN — FLECAINIDE ACETATE 100 MG: 100 TABLET ORAL at 17:06

## 2022-04-07 RX ADMIN — TAMSULOSIN HYDROCHLORIDE 0.4 MG: 0.4 CAPSULE ORAL at 17:06

## 2022-04-07 RX ADMIN — AMLODIPINE BESYLATE 5 MG: 5 TABLET ORAL at 08:19

## 2022-04-07 RX ADMIN — POTASSIUM CHLORIDE 40 MEQ: 20 TABLET, EXTENDED RELEASE ORAL at 08:18

## 2022-04-07 RX ADMIN — GABAPENTIN 100 MG: 100 CAPSULE ORAL at 08:18

## 2022-04-07 RX ADMIN — FLECAINIDE ACETATE 100 MG: 100 TABLET ORAL at 08:20

## 2022-04-07 RX ADMIN — HEPARIN SODIUM 12.8 UNITS/KG/HR: 10000 INJECTION, SOLUTION INTRAVENOUS; SUBCUTANEOUS at 08:13

## 2022-04-07 NOTE — ASSESSMENT & PLAN NOTE
Subjective:   Chano Vaz is a 63 year old male who is RHD with left thumb/hand pain. 2 summers ago was moving rocks and injured hand. Also wants to discuss curvature in back. He has a desk job.  Moving rocks with one hand, injury? Hasn't had it looked at and thought something had pulled.  Screw or pinching/grasping with landscaping, gets painful.  Hurts at the base of his thumb.  Started to have a bit of a curvature.  Physical a couple months ago.  Experimenting with different chairs, strap for shoulders, trying to sit straight.  Not as bad in the morning, worse in the afternoon.  No muscle fatigue.  Exercises for as him as he gets older.  No pain associated with his back.  Curvature at upper lumbar/thoracic area    Background:   Date of injury: Summer 2018   Duration of symptoms: 2 years  Mechanism of Injury: Acute; Activity Related   Aggravating factors: pinching, gripping, lifting.   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 He also has no past medical history of Arthritis, Cerebral infarction (H), Congestive heart failure (H), COPD (chronic obstructive pulmonary disease) (H), Depressive disorder, Diabetes (H), Heart disease, History of blood transfusion, Hypertension, or Uncomplicated asthma.  He  has a past surgical history that includes colonoscopy (2007).  His family history includes Arthritis in his mother; Cancer (age of onset: 18) in his daughter; Hypertension in his father; Prostate Cancer in his maternal grandfather; Prostate Cancer (age of onset: 70) in his maternal uncle; Thyroid Disease in his father.  He reports that he has never smoked. He  On heparin gtt, was supratherapeutic this morning, PTT >210  No active signs of bleeding, continue to monitor incision and drain output    Plan to bridge to Coumadin once drains are discontinued "has never used smokeless tobacco. He reports current alcohol use of about 2.5 - 4.2 standard drinks of alcohol per week. He reports that he does not use drugs.    ALLERGIES: He is allergic to no known allergies.    CURRENT MEDICATIONS: He has a current medication list which includes the following prescription(s): amitriptyline, ascorbic acid, aspirin, and fish oil.     REVIEW OF SYSTEMS: 10 point review of systems is negative except as noted above.     Exam:   Resp 16   Ht 1.899 m (6' 2.75\")   Wt 74.8 kg (165 lb)   BMI 20.76 kg/m             CONSTITUTIONAL: alert, no distress and cooperative  HEAD: Normocephalic. No masses, lesions, tenderness or abnormalities  SKIN: no suspicious lesions or rashes  GAIT: normal  NEUROLOGIC: Non-focal  PSYCHIATRIC: affect normal/bright and mentation appears normal.    MUSCULOSKELETAL: left thumb pain    Inspection:Thumb:  normal  Tender: Thumb:   CMC  Non-tender: Thumb:   MCP joint, IP joint, no triggering  Range of Motion All Normal  Strength: full strength  Special tests: tenderness thenar eminence           Assessment/Plan:   Pt is a 62 yo white male with PMhx of hypothyroidism presenting with left thumb pain and questions about his back  1. Left CMC arthritis-  Hand therapy suggested  Custom fabrication if needed  Tylenol/NSAIDs prn, heat, ice    2. Lumbar spondylosis, facet arthropathy- Low back discomfort in certain positions-  X-ray discussed, degenerative changes, mild  Continue to remain strong  Exercises  Chose office chair that is the most comfortable    RTC prn, 4 weeks after hand therapy    X-RAY INTERPRETATION:   X-Ray of the Left Hand/Fingers: 3-view, left thumb  ordered and interpreted in the office today was positive for IMPRESSION: Osteoarthrosis at the left triscaphe and left first carpometacarpal joint.  "

## 2022-04-07 NOTE — ASSESSMENT & PLAN NOTE
On coumadin outpatient (home regimen 2 5 mg daily except Wed/Sat takes 5 mg daily)    Last dose 3/31  · Heparin gtt started on 4/2 per neurosurgery (per my d/w NSx they would like patient on ACS low protocol NOT VTE/High given recent surgery) plan to bridge to coumadin once both drains are removed  · 1 drain removed on  4/6, other remains   · Monitor PT/INR

## 2022-04-07 NOTE — PLAN OF CARE
Problem: PAIN - ADULT  Goal: Verbalizes/displays adequate comfort level or baseline comfort level  Description: Interventions:  - Encourage patient to monitor pain and request assistance  - Assess pain using appropriate pain scale  - Administer analgesics based on type and severity of pain and evaluate response  - Implement non-pharmacological measures as appropriate and evaluate response  - Consider cultural and social influences on pain and pain management  - Notify physician/advanced practitioner if interventions unsuccessful or patient reports new pain  Outcome: Progressing     Problem: INFECTION - ADULT  Goal: Absence or prevention of progression during hospitalization  Description: INTERVENTIONS:  - Assess and monitor for signs and symptoms of infection  - Monitor lab/diagnostic results  - Monitor all insertion sites, i e  indwelling lines, tubes, and drains  - Monitor endotracheal if appropriate and nasal secretions for changes in amount and color  - [place] appropriate cooling/warming therapies per order  - Administer medications as ordered  - Instruct and encourage patient and family to use good hand hygiene technique  - Identify and instruct in appropriate isolation precautions for identified infection/condition  Outcome: Progressing  Goal: Absence of fever/infection during neutropenic period  Description: INTERVENTIONS:  - Monitor WBC    Outcome: Progressing     Problem: SAFETY ADULT  Goal: Patient will remain free of falls  Description: INTERVENTIONS:  - Educate patient/family on patient safety including physical limitations  - Instruct patient to call for assistance with activity   - Consult OT/PT to assist with strengthening/mobility   - Keep Call bell within reach  - Keep bed low and locked with side rails adjusted as appropriate  - Keep care items and personal belongings within reach  - Initiate and maintain comfort rounds  - Make Fall Risk Sign visible to staff  - Offer Toileting   - Obtain necessary fall risk management equipment  - Apply yellow socks and bracelet for high fall risk patients  - Consider moving patient to room near nurses station  Outcome: Progressing  Goal: Maintain or return to baseline ADL function  Description: INTERVENTIONS:  -  Assess patient's ability to carry out ADLs; assess patient's baseline for ADL function and identify physical deficits which impact ability to perform ADLs (bathing, care of mouth/teeth, toileting, grooming, dressing, etc )  - Assess/evaluate cause of self-care deficits   - Assess range of motion  - Assess patient's mobility; develop plan if impaired  - Assess patient's need for assistive devices and provide as appropriate  - Encourage maximum independence but intervene and supervise when necessary  - Involve family in performance of ADLs  - Assess for home care needs following discharge   - Consider OT consult to assist with ADL evaluation and planning for discharge  - Provide patient education as appropriate  Outcome: Progressing  Goal: Maintains/Returns to pre admission functional level  Description: INTERVENTIONS:  - Perform BMAT or MOVE assessment daily    - Set and communicate daily mobility goal to care team and patient/family/caregiver     - Collaborate with rehabilitation services on mobility goals if consulted  - Out of bed for toileting  - Record patient progress and toleration of activity level   Outcome: Progressing     Problem: DISCHARGE PLANNING  Goal: Discharge to home or other facility with appropriate resources  Description: INTERVENTIONS:  - Identify barriers to discharge w/patient and caregiver  - Arrange for needed discharge resources and transportation as appropriate  - Identify discharge learning needs (meds, wound care, etc )  - Arrange for interpretive services to assist at discharge as needed  - Refer to Case Management Department for coordinating discharge planning if the patient needs post-hospital services based on physician/advanced practitioner order or complex needs related to functional status, cognitive ability, or social support system  Outcome: Progressing     Problem: Knowledge Deficit  Goal: Patient/family/caregiver demonstrates understanding of disease process, treatment plan, medications, and discharge instructions  Description: Complete learning assessment and assess knowledge base    Interventions:  - Provide teaching at level of understanding  - Provide teaching via preferred learning methods  Outcome: Progressing     Problem: Potential for Falls  Goal: Patient will remain free of falls  Description: INTERVENTIONS:  - Educate patient/family on patient safety including physical limitations  - Instruct patient to call for assistance with activity   - Consult OT/PT to assist with strengthening/mobility   - Keep Call bell within reach  - Keep bed low and locked with side rails adjusted as appropriate  - Keep care items and personal belongings within reach  - Initiate and maintain comfort rounds  - Make Fall Risk Sign visible to staff  - Offer [address] necessary fall risk management equipment  - Apply yellow socks and bracelet for high fall risk patients  - Consider moving patient to room near nurses station  Outcome: Progressing     Problem: MOBILITY - ADULT  Goal: Maintain or return to baseline ADL function  Description: INTERVENTIONS:  -  Assess patient's ability to carry out ADLs; assess patient's baseline for ADL function and identify physical deficits which impact ability to perform ADLs (bathing, care of mouth/teeth, toileting, grooming, dressing, etc )  - Assess/evaluate cause of self-care deficits   - Assess range of motion  - Assess patient's mobility; develop plan if impaired  - Assess patient's need for assistive devices and provide as appropriate  - Encourage maximum independence but intervene and supervise when necessary  - Involve family in performance of ADLs  - Assess for home care needs following discharge   - Consider OT consult to assist with ADL evaluation and planning for discharge  - Provide patient education as appropriate  Outcome: Progressing  Goal: Maintains/Returns to pre admission functional level  Description: INTERVENTIONS:  - Perform BMAT or MOVE assessment daily    - Set and communicate daily mobility goal to care team and patient/family/caregiver     - Collaborate with rehabilitation services on mobility goals if consulted  - Out of bed for toileting  - Record patient progress and toleration of activity level   Outcome: Progressing     Problem: Prexisting or High Potential for Compromised Skin Integrity  Goal: Skin integrity is maintained or improved  Description: INTERVENTIONS:  - Identify patients at risk for skin breakdown  - Assess and monitor skin integrity  - Assess and monitor nutrition and hydration status  - Monitor labs   - Assess for incontinence   - Turn and reposition patient  - Assist with mobility/ambulation  - Relieve pressure over bony prominences  - Avoid friction and shearing  - Provide appropriate hygiene as needed including keeping skin clean and dry  - Evaluate need for skin moisturizer/barrier cream  - Collaborate with interdisciplinary team   - Patient/family teaching  - Consider wound care consult   Outcome: Progressing     Problem: MUSCULOSKELETAL - ADULT  Goal: Maintain or return mobility to safest level of function  Description: INTERVENTIONS:  - Assess patient's ability to carry out ADLs; assess patient's baseline for ADL function and identify physical deficits which impact ability to perform ADLs (bathing, care of mouth/teeth, toileting, grooming, dressing, etc )  - Assess/evaluate cause of self-care deficits   - Assess range of motion  - Assess patient's mobility  - Assess patient's need for assistive devices and provide as appropriate  - Encourage maximum independence but intervene and supervise when necessary  - Involve family in performance of ADLs  - Assess for home care needs following discharge   - Consider OT consult to assist with ADL evaluation and planning for discharge  - Provide patient education as appropriate  Outcome: Progressing  Goal: Maintain proper alignment of affected body part  Description: INTERVENTIONS:  - Support, maintain and protect limb and body alignment  - Provide patient/ family with appropriate education  Outcome: Progressing

## 2022-04-07 NOTE — PROGRESS NOTES
Progress Note - Infectious Disease   Neville Sherman 71 y o  male MRN: [id number]  Unit/Bed#: [facility] 608-01 Encounter: [id number]      Impression:  1  Postoperative MRSA cervical wound infection R/0 vertebral osteomyelitis S/P posterior cervical a VAC UA shin of postoperative collection and debridement with placement of drains C3-T1 POD 6  2  S/P  anterior cervical diskectomy and fixation fusion C5-6 and C6-7 posterior cervical decompression and instrumented fusion C3-T1 on 22  3  History of PAF on Coumadin  4  Factor 5 Leiden mutation  5  WILL    Recommendations:  Afebrile with normal WBC count when last taken  Wound dressing is dry and pictures 4/ show decreased inflammation without purulent discharge  One drain remains  1  Final cultures are showing MRSA  2  Continue vancomycin IV with further adjustment of dose by pharmacy  3  Would anticipate at least a four-week IV vancomycin course followed by approximately 4 weeks of p o  Rx   4  Discussed with patient's son who is at bedside as well as the patient and all questions answered      Antibiotics:  1  Vancomycin 1500 mg q 12 hours IV, day 7 of 28 Rx    Subjective:  Discomfort left lateral neck minimal   Able to ambulate to bathroom and in hallway  Denies fevers, chills, or sweats  Denies nausea, vomiting, or diarrhea  Objective:  Vitals:  Temp:  [98 2 °F (36 8 °C)-98 9 °F (37 2 °C)] 98 2 °F (36 8 °C)  HR:  [57-66] 57  Resp:  [16-18] 17  BP: (137-163)/(68-73) 163/73  SpO2:  [95 %-97 %] 96 %  Temp (24hrs), Av 4 °F (36 9 °C), Min:98 2 °F (36 8 °C), Max:98 9 °F (37 2 °C)  Current: Temperature: 98 2 °F (36 8 °C)    Physical Exam:     General Appearance:  Alert, nontoxic, no acute distress  [place] collar in place  Posterior cervical wound with 1 MARCIN drain and 1+ surrounding erythema  Picture noted   Throat: Oropharynx moist without lesions    Lips, mucosa, and tongue normal   Neck: [place] collar with posterior cervical wound with staples and retention sutures and 1 MARCIN drain with modest serosanguineous drainage  Dressing is dry  1+ surrounding erythema as per picture on chart   Lungs:   Clear to auscultation bilaterally, no audible wheezes, rhonchi or rales; respirations unlabored   Heart:  Regular rate and rhythm, S1, S2 normal, no murmur, rub or gallop   Abdomen:   Soft, non-tender, surgical scars non-distended, positive bowel sounds  No masses, no organomegaly    No CVA tenderness   Extremities: Extremities normal, atraumatic, no clubbing, cyanosis or edema  PICC line right basilic   Skin: As above, surgical scar  Invasive Devices  Report    Peripherally Inserted Central Catheter Line            PICC Line 83/77/94 Right Basilic 5 days          Drain            Closed/Suction Drain Posterior; Left Neck Bulb 7 Fr  6 days                Labs, Imaging, & Other studies:   All pertinent labs were personally reviewed  Results from last 7 days   Lab Units 04/04/22  0826 04/03/22  0051 04/02/22  1041   WBC Thousand/uL 7 48 6 73 8 12   HEMOGLOBIN g/dL 8 4* 7 9* 9 8*   PLATELETS Thousands/uL 318 308 347     Results from last 7 days   Lab Units 04/07/22  0515 04/05/22  1359 04/05/22  1359 04/03/22  0051 04/03/22  0051   SODIUM mmol/L 139  --  139  --  138   POTASSIUM mmol/L 3 6   < > 3 8   < > 4 0   CHLORIDE mmol/L 106   < > 106   < > 106   CO2 mmol/L 30   < > 31   < > 29   BUN mg/dL 9   < > 10   < > 17   CREATININE mg/dL 0 74   < > 0 64   < > 0 72   EGFR ml/min/1 73sq m 92   < > 98   < > 93   CALCIUM mg/dL 8 6   < > 8 9   < > 8 4    < > = values in this interval not displayed       Results from last 7 days   Lab Units 04/01/22  1525 03/31/22  1836   GRAM STAIN RESULT  3+ Disintegrating polys*  1+ Gram positive cocci in pairs* 2+ Polys*  2+ Gram positive cocci in clusters*   WOUND CULTURE   --  2+ Growth of Methicillin Resistant Staphylococcus aureus*

## 2022-04-07 NOTE — ASSESSMENT & PLAN NOTE
POD 6 posterior cervical evacuation of postoperative collection and debridement with placement of drains C3-T1  · S/p Anterior cervical discectomy and fixation fusion C5/6 and C6/7; Posterior cervical decompression and instrumented fusion C3-T1 with Dr Fito Robison on 3/11/22  · Presented as a direct admission from the office on 03/31 with concerns for wound infection    Imaging:   · CT Cervical w/ contrast 3/31/2022: Large posterior soft tissue fluid collection, morphology suspicious for infection as clinically suspected  Study is nondiagnostic for evaluation of the central canal and epidural abscess  MR is better suited intracanalicular evaluation although will be limited by artifact from metal hardware  Plan:   Continue to monitor neurological exam and symptoms   Blood Cx no growth after 5 days    Wound cx 3/31 + MRSA   Intraop cx tissue culture 4/1 + MRSA   [place] collar to remain in place at all times, okay for [place] collar for showering   MARCIN drains:  o Left to gravity with 63 mL/24 hours  o Right MARCIN removed yesterday   · SLIM and ID consulted - appreciate recommendations  · Patient will require 4 weeks of IV vancomycin followed by 4 weeks of p o  antibiotics  · PICC placed  · Maintain vancomycin 1,5000 mg q12h per ID  · Continue to monitor incision  · Pain well controlled on current pain regimen:  · Gabapentin 100mg daily and 300mg at HS  · Tylenol 975 mg TID prn  · Robaxin 750 mg q 6 hours p r n   · Oxycodone 2 5-5mg q 4 hours p r n  For moderate or severe pain  · Bowel regimen: Senokot, and MiraLax  · Mobilize with PT/OT  Home with outpatient rehab  · DVT ppx: SCDs, on heparin gtt    Plan of care discussed with MIREILLE Coreas  Neurosurgery will follow as primary, call with any further questions or concerns

## 2022-04-07 NOTE — ASSESSMENT & PLAN NOTE
Appears to be postop anemia since recent surgery in March, hemoglobin between 9-10 since that time and previously within normal limits   · Hemoglobin was down-trending since operative washout, no signs or symptoms of active bleeding   · folate and B12 WNL  · Low Fe level however elevated ferritin  · Continue to monitor and transfuse as needed

## 2022-04-07 NOTE — ASSESSMENT & PLAN NOTE
Hx of cervical myelopathy, s/p anterior cervical discectomy and fixation fusion C5/6 and C6/7; Posterior cervical decompression and instrumented fusion C3-T1 with Dr Balaji Washington on 3/11/22  Discharged to [facility] for rehab on 3/18, then discharged home on 3/29  Noted to have increased redness and drainage at incision site, no improvement after 6 days of Keflex  Referred to ED from OP office visit for further management  · CT cervical spine: Large posterior soft tissue fluid collection, morphology suspicious for infection as clinically suspected  Study is nondiagnostic for evaluation of the central canal and epidural abscess  No specific findings of osteomyelitis     · Admitted under neurosurgery service  · Status post operative washout on 4/1   · ID following and managing antibiotics  · Wound cultures and OR cultures are growing MRSA   · Currently on vancomycin, will need 4 weeks IV antibiotics, PICC line in place  · Blood cultures with no growth to date   · Analgesics as needed   · PT/OT recommending outpatient rehabilitation once medically stable

## 2022-04-07 NOTE — ASSESSMENT & PLAN NOTE
· On heparin gtt, PTT this am was 93  · No active signs of bleeding, continue to monitor incision  · Plan to bridge to Coumadin once drains are discontinued

## 2022-04-07 NOTE — ASSESSMENT & PLAN NOTE
POD 7 posterior cervical evacuation of postoperative collection and debridement with placement of drains C3-T1  · S/p Anterior cervical discectomy and fixation fusion C5/6 and C6/7; Posterior cervical decompression and instrumented fusion C3-T1 with Dr Pelon Gates on 3/11/22  · Presented as a direct admission from the office on 03/31 with concerns for wound infection    Imaging:   · CT Cervical w/ contrast 3/31/2022: Large posterior soft tissue fluid collection, morphology suspicious for infection as clinically suspected  Study is nondiagnostic for evaluation of the central canal and epidural abscess  MR is better suited intracanalicular evaluation although will be limited by artifact from metal hardware  Plan:   Continue to monitor neurological exam and symptoms   Blood Cx no growth after 5 days    Wound cx 3/31 + MRSA   Intraop cx tissue culture 4/1 + MRSA   [place] collar to remain in place at all times, okay for [place] collar for showering   MARCIN drains:  o Left with 45cc in 24 hours  Removed today   o Right MARCIN removed 4/6/2022  · SLIM and ID consulted - appreciate recommendations  · Patient will require 4 weeks of IV vancomycin followed by 4 weeks of p o  antibiotics  · PICC placed  · Maintain vancomycin 1,5000 mg q12h per ID with pharmacy dosing  · Continue to monitor incision  · Pain well controlled on current pain regimen:  · Gabapentin 100mg daily and 300mg at HS  · Tylenol 975 mg TID prn  · Robaxin 750 mg q 6 hours p r n   · Oxycodone 2 5-5mg q 4 hours p r n  For moderate or severe pain  · Bowel regimen: Senokot, and MiraLax  · Mobilize with PT/OT  Home with outpatient rehab  · Mobilize as tolerated  Out of bed to chair with meals  · DVT ppx: SCDs, on heparin gtt  · Plan of care discussed with MIREILLE Funk  Neurosurgery will follow as primary, call with any further questions or concerns

## 2022-04-07 NOTE — PROGRESS NOTES
[address] [facility]  Progress Note - Ramiro Hanley 1950, 71 y o  male MRN: [id number]  Unit/Bed#: [facility] 438-47 Encounter: [id number]  Primary Care Provider: Cielo Ramirez MD   Date and time admitted to hospital: 3/31/2022 10:58 AM    * Surgical site infection  Assessment & Plan  Hx of cervical myelopathy, s/p anterior cervical discectomy and fixation fusion C5/6 and C6/7; Posterior cervical decompression and instrumented fusion C3-T1 with Dr Soumya Hensley on 3/11/22  Discharged to [facility] for rehab on 3/18, then discharged home on 3/29  Noted to have increased redness and drainage at incision site, no improvement after 6 days of Keflex  Referred to ED from OP office visit for further management  · CT cervical spine: Large posterior soft tissue fluid collection, morphology suspicious for infection as clinically suspected  Study is nondiagnostic for evaluation of the central canal and epidural abscess  No specific findings of osteomyelitis  · Admitted under neurosurgery service  · Status post operative washout on 4/1   · ID following and managing antibiotics  · Wound cultures and OR cultures are growing MRSA   · Currently on vancomycin, will need 4 weeks IV antibiotics, PICC line in place  · Blood cultures with no growth to date   · Analgesics as needed   · PT/OT recommending outpatient rehabilitation once medically stable     Cervical myelopathy ([facility] [address] )  Assessment & Plan  Status post cervical spinal fusion on 3/11/22 as above   · See related plan     Factor V Leiden mutation ([facility] )  Assessment & Plan  On coumadin outpatient (home regimen 2 5 mg daily except Wed/Sat takes 5 mg daily)    Last dose 3/31  · Heparin gtt started on 4/2 per neurosurgery (per my d/w NSx they would like patient on ACS low protocol NOT VTE/High given recent surgery) plan to bridge to coumadin once both drains are removed  · 1 drain removed on  4/6, other remains   · Monitor PT/INR    Anemia  Assessment & Plan  Appears to be postop anemia since recent surgery in March, hemoglobin between 9-10 since that time and previously within normal limits   · Hemoglobin was down-trending since operative washout, no signs or symptoms of active bleeding   · folate and B12 WNL  · Low Fe level however elevated ferritin  · Continue to monitor and transfuse as needed     Mixed hyperlipidemia  Assessment & Plan  · Continue statin     Paroxysmal A-fib (HCC)  Assessment & Plan  Continue with home dose BB/Flecainide  · Currently on heparin gtt while off home warfarin, bridge when appropriate per primary with goal INR 2-3    WILL (obstructive sleep apnea)  Assessment & Plan  · CPAP qhs     Essential hypertension  Assessment & Plan  BP acceptable, monitor routinely   · Continue home dose Norvasc, metoprolol         VTE Pharmacologic Prophylaxis: VTE Score: 10 Moderate Risk (Score 3-4) - Pharmacological DVT Prophylaxis Ordered: heparin drip  Patient Centered Rounds: I performed bedside rounds with nursing staff today  Discussions with Specialists or Other Care Team Provider: d/w primary team    Education and Discussions with Family / Patient: family updates per primary team       Time Spent for Care: 20 minutes  More than 50% of total time spent on counseling and coordination of care as described above  Current Length of Stay: 7 day(s)  Current Patient Status: Inpatient   Certification Statement: The patient will continue to require additional inpatient hospital stay due to ongoing plans per primary  Discharge Plan: Anticipate discharge in >72 hrs to home  Code Status: Level 1 - Full Code    Subjective:   Patient reports doing okay today  He has a little bit of pain in the left side of his neck where the drain is been otherwise feeling well  No fevers or chills  No bleeding reported  Had some diarrhea yesterday but feels this is secondary to the stool softeners       Objective:     Vitals:   Temp (24hrs), Av 4 °F (36 9 °C), Min:98 2 °F (36 8 °C), Max:98 9 °F (37 2 °C)    Temp:  [98 2 °F (36 8 °C)-98 9 °F (37 2 °C)] 98 2 °F (36 8 °C)  HR:  [58-66] 62  Resp:  [16-19] 16  BP: (137-165)/(68-78) 137/69  SpO2:  [95 %-97 %] 95 %  Body mass index is 32 78 kg/m²  Input and Output Summary (last 24 hours): Intake/Output Summary (Last 24 hours) at 4/7/2022 0856  Last data filed at 4/7/2022 0800  Gross per 24 hour   Intake 1560 ml   Output 1980 ml   Net -420 ml       Physical Exam:   Physical Exam  Vitals and nursing note reviewed  Constitutional:       General: He is not in acute distress  Appearance: He is obese  Comments: C-Collar in place, anterior neck incision steri strips noted  Posterior neck incision covered with dressing, 1 MARCIN drain remains with small amt of sanguinous drainage    Cardiovascular:      Rate and Rhythm: Normal rate and regular rhythm  Pulmonary:      Effort: No respiratory distress  Abdominal:      General: There is distension  Tenderness: There is no abdominal tenderness  Musculoskeletal:      Right lower leg: No edema  Left lower leg: No edema  Neurological:      Mental Status: He is oriented to person, place, and time  Psychiatric:         Mood and Affect: Mood normal           Additional Data:     Labs:  Results from last 7 days   Lab Units 04/04/22  0826 04/03/22  0051 04/03/22  0051   WBC Thousand/uL 7 48   < > 6 73   HEMOGLOBIN g/dL 8 4*   < > 7 9*   HEMATOCRIT % 25 3*   < > 24 8*   PLATELETS Thousands/uL 318   < > 308   NEUTROS PCT %  --   --  65   LYMPHS PCT %  --   --  23   MONOS PCT %  --   --  7   EOS PCT %  --   --  3    < > = values in this interval not displayed       Results from last 7 days   Lab Units 04/07/22  0515   SODIUM mmol/L 139   POTASSIUM mmol/L 3 6   CHLORIDE mmol/L 106   CO2 mmol/L 30   BUN mg/dL 9   CREATININE mg/dL 0 74   ANION GAP mmol/L 3*   CALCIUM mg/dL 8 6   GLUCOSE RANDOM mg/dL 93     Results from last 7 days   Lab Units 04/07/22  0515   INR  1 37* Results from last 7 days   Lab Units 04/01/22  1707   POC GLUCOSE mg/dl 126               Lines/Drains:  Invasive Devices  Report    Peripherally Inserted Central Catheter Line            PICC Line 19/07/43 Right Basilic 4 days          Drain            Closed/Suction Drain Posterior; Left Neck Bulb 7 Fr  5 days                Central Line:  Goal for removal: N/A - Discharging with PICC for IV ABX/medications             Imaging: No pertinent imaging reviewed  Recent Cultures (last 7 days):   Results from last 7 days   Lab Units 04/01/22  1525 03/31/22  1836 03/31/22  1619   BLOOD CULTURE   --   --  No Growth After 5 Days  No Growth After 5 Days     GRAM STAIN RESULT  3+ Disintegrating polys*  1+ Gram positive cocci in pairs* 2+ Polys*  2+ Gram positive cocci in clusters*  --    WOUND CULTURE   --  2+ Growth of Methicillin Resistant Staphylococcus aureus*  --        Last 24 Hours Medication List:   Current Facility-Administered Medications   Medication Dose Route Frequency Provider Last Rate    acetaminophen  975 mg Oral TID PRN Alison Biundo, PA-C      amLODIPine  5 mg Oral Daily Alison Biundo, PA-C      docusate sodium  100 mg Oral BID Alison Biundo, PA-C      DULoxetine  60 mg Oral Daily Alison Biundo, PA-C      flecainide  100 mg Oral BID Alison Biundo, PA-C      gabapentin  100 mg Oral Daily Alison Biundo, PA-C      gabapentin  300 mg Oral HS Alison Biundo, PA-C      heparin (porcine)  3-20 Units/kg/hr (Order-Specific) Intravenous Titrated VEENA Ahumada-C 12 8 Units/kg/hr (04/07/22 0813)    methocarbamol  750 mg Oral Q6H PRN DEEPTHI Sanders      metoprolol succinate  100 mg Oral Daily Alison Biundo, PA-C      ondansetron  4 mg Intravenous Q6H PRN Alison Biundo, PA-C      oxyCODONE  2 5 mg Oral Q4H PRN Alison Biundo, PA-C      oxyCODONE  5 mg Oral Q4H PRN Alison Biundo, PA-C      polyethylene glycol  17 g Oral Daily PRN Brittnee Betts Check-DEEPTHI Peña      potassium chloride  40 mEq Oral Daily Alison Faria PA-C      pravastatin  40 mg Oral Daily Alison Faria PA-C      senna  8 6 mg Oral Daily Alison Faria PA-C      tamsulosin  0 4 mg Oral Daily With FABIOLA Pradhan      vancomycin  1,500 mg Intravenous Q12H DEEPTHI Sanders          Today, Patient Was Seen By: Kathleen Macedo PA-C    **Please Note: This note may have been constructed using a voice recognition system  **

## 2022-04-07 NOTE — ASSESSMENT & PLAN NOTE
Continue with home dose BB/Flecainide  · Currently on heparin gtt while off home warfarin, bridge when appropriate per primary with goal INR 2-3

## 2022-04-08 LAB
ABO GROUP BLD: NORMAL
APTT PPP: 32 SECONDS (ref 23–37)
APTT PPP: 93 SECONDS (ref 23–37)
ARTIFACT: PRESENT
BASOPHILS # BLD MANUAL: 0.14 THOUSAND/UL (ref 0–0.1)
BASOPHILS NFR MAR MANUAL: 2 % (ref 0–1)
BLD GP AB SCN SERPL QL: NEGATIVE
EOSINOPHIL # BLD MANUAL: 0.28 THOUSAND/UL (ref 0–0.4)
EOSINOPHIL NFR BLD MANUAL: 4 % (ref 0–6)
ERYTHROCYTE [DISTWIDTH] IN BLOOD BY AUTOMATED COUNT: 13.6 % (ref 11.6–15.1)
ERYTHROCYTE [DISTWIDTH] IN BLOOD BY AUTOMATED COUNT: 13.8 % (ref 11.6–15.1)
HCT VFR BLD AUTO: 24.2 % (ref 36.5–49.3)
HCT VFR BLD AUTO: 25.9 % (ref 36.5–49.3)
HCT VFR BLD AUTO: 26.4 % (ref 36.5–49.3)
HGB BLD-MCNC: 7.7 G/DL (ref 12–17)
HGB BLD-MCNC: 8.4 G/DL (ref 12–17)
HGB BLD-MCNC: 8.5 G/DL (ref 12–17)
INR PPP: 1.24 (ref 0.84–1.19)
LYMPHOCYTES # BLD AUTO: 0.84 THOUSAND/UL (ref 0.6–4.47)
LYMPHOCYTES # BLD AUTO: 12 % (ref 14–44)
MCH RBC QN AUTO: 30 PG (ref 26.8–34.3)
MCH RBC QN AUTO: 30.7 PG (ref 26.8–34.3)
MCHC RBC AUTO-ENTMCNC: 31.8 G/DL (ref 31.4–37.4)
MCHC RBC AUTO-ENTMCNC: 32.4 G/DL (ref 31.4–37.4)
MCV RBC AUTO: 94 FL (ref 82–98)
MCV RBC AUTO: 95 FL (ref 82–98)
MONOCYTES # BLD AUTO: 0.21 THOUSAND/UL (ref 0–1.22)
MONOCYTES NFR BLD: 3 % (ref 4–12)
NEUTROPHILS # BLD MANUAL: 5.54 THOUSAND/UL (ref 1.85–7.62)
NEUTS SEG NFR BLD AUTO: 79 % (ref 43–75)
PLATELET # BLD AUTO: 282 THOUSANDS/UL (ref 149–390)
PLATELET # BLD AUTO: 330 THOUSANDS/UL (ref 149–390)
PLATELET BLD QL SMEAR: ADEQUATE
PMV BLD AUTO: 9.2 FL (ref 8.9–12.7)
PMV BLD AUTO: 9.6 FL (ref 8.9–12.7)
POLYCHROMASIA BLD QL SMEAR: PRESENT
PROTHROMBIN TIME: 15.1 SECONDS (ref 11.6–14.5)
RBC # BLD AUTO: 2.57 MILLION/UL (ref 3.88–5.62)
RBC # BLD AUTO: 2.74 MILLION/UL (ref 3.88–5.62)
RBC MORPH BLD: PRESENT
RH BLD: POSITIVE
SPECIMEN EXPIRATION DATE: NORMAL
WBC # BLD AUTO: 7.01 THOUSAND/UL (ref 4.31–10.16)
WBC # BLD AUTO: 7.24 THOUSAND/UL (ref 4.31–10.16)

## 2022-04-08 PROCEDURE — 85027 COMPLETE CBC AUTOMATED: CPT | Performed by: INTERNAL MEDICINE

## 2022-04-08 PROCEDURE — 86901 BLOOD TYPING SEROLOGIC RH(D): CPT | Performed by: NURSE PRACTITIONER

## 2022-04-08 PROCEDURE — 85730 THROMBOPLASTIN TIME PARTIAL: CPT | Performed by: PHYSICIAN ASSISTANT

## 2022-04-08 PROCEDURE — 85007 BL SMEAR W/DIFF WBC COUNT: CPT | Performed by: INTERNAL MEDICINE

## 2022-04-08 PROCEDURE — 85014 HEMATOCRIT: CPT | Performed by: NURSE PRACTITIONER

## 2022-04-08 PROCEDURE — 86850 RBC ANTIBODY SCREEN: CPT | Performed by: NURSE PRACTITIONER

## 2022-04-08 PROCEDURE — 85610 PROTHROMBIN TIME: CPT | Performed by: PHYSICIAN ASSISTANT

## 2022-04-08 PROCEDURE — 85027 COMPLETE CBC AUTOMATED: CPT | Performed by: PHYSICIAN ASSISTANT

## 2022-04-08 PROCEDURE — 99232 SBSQ HOSP IP/OBS MODERATE 35: CPT | Performed by: NURSE PRACTITIONER

## 2022-04-08 PROCEDURE — 99232 SBSQ HOSP IP/OBS MODERATE 35: CPT | Performed by: INTERNAL MEDICINE

## 2022-04-08 PROCEDURE — 85730 THROMBOPLASTIN TIME PARTIAL: CPT | Performed by: INTERNAL MEDICINE

## 2022-04-08 PROCEDURE — 86900 BLOOD TYPING SEROLOGIC ABO: CPT | Performed by: NURSE PRACTITIONER

## 2022-04-08 PROCEDURE — 99024 POSTOP FOLLOW-UP VISIT: CPT | Performed by: PHYSICIAN ASSISTANT

## 2022-04-08 PROCEDURE — 85018 HEMOGLOBIN: CPT | Performed by: NURSE PRACTITIONER

## 2022-04-08 RX ORDER — FLUTICASONE PROPIONATE 50 MCG
1 SPRAY, SUSPENSION (ML) NASAL DAILY
Status: DISCONTINUED | OUTPATIENT
Start: 2022-04-08 | End: 2022-04-15 | Stop reason: HOSPADM

## 2022-04-08 RX ORDER — WARFARIN SODIUM 5 MG/1
5 TABLET ORAL
Status: DISCONTINUED | OUTPATIENT
Start: 2022-04-09 | End: 2022-04-12

## 2022-04-08 RX ORDER — WARFARIN SODIUM 5 MG/1
5 TABLET ORAL
Status: DISCONTINUED | OUTPATIENT
Start: 2022-04-08 | End: 2022-04-08

## 2022-04-08 RX ADMIN — METOPROLOL SUCCINATE 100 MG: 100 TABLET, EXTENDED RELEASE ORAL at 08:55

## 2022-04-08 RX ADMIN — VANCOMYCIN HYDROCHLORIDE 1250 MG: 10 INJECTION, POWDER, LYOPHILIZED, FOR SOLUTION INTRAVENOUS at 23:20

## 2022-04-08 RX ADMIN — PRAVASTATIN SODIUM 40 MG: 40 TABLET ORAL at 08:55

## 2022-04-08 RX ADMIN — GABAPENTIN 100 MG: 100 CAPSULE ORAL at 08:55

## 2022-04-08 RX ADMIN — SENNOSIDES 8.6 MG: 8.6 TABLET, FILM COATED ORAL at 08:55

## 2022-04-08 RX ADMIN — ACETAMINOPHEN 975 MG: 325 TABLET ORAL at 05:40

## 2022-04-08 RX ADMIN — GABAPENTIN 300 MG: 300 CAPSULE ORAL at 21:14

## 2022-04-08 RX ADMIN — POTASSIUM CHLORIDE 40 MEQ: 20 TABLET, EXTENDED RELEASE ORAL at 08:55

## 2022-04-08 RX ADMIN — FLECAINIDE ACETATE 100 MG: 100 TABLET ORAL at 08:57

## 2022-04-08 RX ADMIN — FLECAINIDE ACETATE 100 MG: 100 TABLET ORAL at 17:20

## 2022-04-08 RX ADMIN — TAMSULOSIN HYDROCHLORIDE 0.4 MG: 0.4 CAPSULE ORAL at 17:31

## 2022-04-08 RX ADMIN — METHOCARBAMOL TABLETS 750 MG: 750 TABLET, COATED ORAL at 05:40

## 2022-04-08 RX ADMIN — METHOCARBAMOL TABLETS 750 MG: 750 TABLET, COATED ORAL at 21:14

## 2022-04-08 RX ADMIN — VANCOMYCIN HYDROCHLORIDE 1250 MG: 10 INJECTION, POWDER, LYOPHILIZED, FOR SOLUTION INTRAVENOUS at 14:14

## 2022-04-08 RX ADMIN — AMLODIPINE BESYLATE 5 MG: 5 TABLET ORAL at 08:55

## 2022-04-08 RX ADMIN — OXYCODONE HYDROCHLORIDE 5 MG: 5 TABLET ORAL at 21:14

## 2022-04-08 RX ADMIN — HEPARIN SODIUM 11.1 UNITS/KG/HR: 10000 INJECTION, SOLUTION INTRAVENOUS; SUBCUTANEOUS at 17:18

## 2022-04-08 RX ADMIN — DULOXETINE HYDROCHLORIDE 60 MG: 60 CAPSULE, DELAYED RELEASE ORAL at 08:55

## 2022-04-08 RX ADMIN — FLUTICASONE PROPIONATE 1 SPRAY: 50 SPRAY, METERED NASAL at 14:14

## 2022-04-08 NOTE — ASSESSMENT & PLAN NOTE
Continue with home dose BB/Flecainide  · Continue heparin drip, 2nd MARCIN drain will be removed by primary team today therefore cleared to restart Coumadin today  Goal INR between 2 and 3

## 2022-04-08 NOTE — PROGRESS NOTES
[address] [facility]  Progress Note - Theo Kitchen 1950, 71 y o  male MRN: [id number]  Unit/Bed#: [facility] 619-69 Encounter: [id number]  Primary Care Provider: Gwendalyn Bosworth, MD   Date and time admitted to hospital: 3/31/2022 10:58 AM    * Surgical site infection  Assessment & Plan  Hx of cervical myelopathy, s/p anterior cervical discectomy and fixation fusion C5/6 and C6/7; Posterior cervical decompression and instrumented fusion C3-T1 with Dr Annette Caal on 3/11/22  Discharged to [facility] for rehab on 3/18, then discharged home on 3/29  Noted to have increased redness and drainage at incision site, no improvement after 6 days of Keflex  Referred to ED from OP office visit for further management  · CT cervical spine: Large posterior soft tissue fluid collection, morphology suspicious for infection as clinically suspected  Study is nondiagnostic for evaluation of the central canal and epidural abscess  No specific findings of osteomyelitis  · Admitted under neurosurgery service  · Status post operative washout on 4/1   · ID following and managing antibiotics  · Wound cultures and OR cultures are growing MRSA   · Currently on vancomycin, will need 4 weeks IV antibiotics followed by similar course of p o  Antibiotics, PICC line in place  · Blood cultures with no growth to date   · Analgesics as needed   · PT/OT recommending outpatient rehabilitation once medically stable     Factor V Leiden mutation [facility])  Assessment & Plan  On coumadin outpatient (home regimen 2 5 mg daily except Wed/Sat takes 5 mg daily)   Last dose 3/31  · Heparin gtt started on 4/2 per neurosurgery (per my d/w NSx they would like patient on ACS low protocol NOT VTE/High given recent surgery) okay to start Coumadin this evening per my discussion with Neurosurgery  · Second MARCIN drain to be removed today    · Monitor PT/INR    Paroxysmal A-fib [facility])  Assessment & Plan  Continue with home dose BB/Flecainide  · Continue heparin drip, 2nd MARCIN drain will be removed by primary team today therefore cleared to restart Coumadin today  Goal INR between 2 and 3  Cervical myelopathy (HCC)  Assessment & Plan  Status post cervical spinal fusion on 3/11/22 as above   · See related plan     Anemia  Assessment & Plan  Appears to be postop anemia since recent surgery in March, hemoglobin between 9-10 since that time and previously within normal limits   · Hemoglobin was down-trending since operative washout, no signs or symptoms of active bleeding   · folate and B12 WNL  · Low Fe level however elevated ferritin  · Continue to monitor and transfuse as needed     Mixed hyperlipidemia  Assessment & Plan  · Continue statin     Essential hypertension  Assessment & Plan  BP acceptable, monitor routinely   · Continue home dose Norvasc, metoprolol     WILL (obstructive sleep apnea)  Assessment & Plan  · CPAP qhs       VTE Pharmacologic Prophylaxis: VTE Score: 10 Resuming heparin drip when okay with Neurosurgery    Patient Centered Rounds: I performed bedside rounds with nursing staff today  Discussions with Specialists or Other Care Team Provider:  Nursing, neurosurgery PA [facility] and Discussions with Family / Patient: Defer to primary team      Time Spent for Care: 30 minutes  More than 50% of total time spent on counseling and coordination of care as described above  Current Length of Stay: 8 day(s)    Current Patient Status: Inpatient     Certification Statement: The patient will continue to require additional inpatient hospital stay due to Pending therapeutic INR, rest of plan per primary     Discharge Plan: Anticipate discharge in 48-72 hrs to home  Code Status: Level 1 - Full Code    Subjective:   Patient reports some mild tenderness at MARCIN insertion site as well as some nasal congestion which seems to be worse at night  Otherwise, no complaints      Objective:     Vitals:   Temp (24hrs), Av °F (36 7 °C), Min:97 8 °F (36 6 °C), Max:98 2 °F (36 8 °C)    Temp:  [97 8 °F (36 6 °C)-98 2 °F (36 8 °C)] 97 8 °F (36 6 °C)  HR:  [57-63] 63  Resp:  [17-19] 19  BP: (127-163)/(64-73) 156/67  SpO2:  [93 %-96 %] 93 %  Body mass index is 32 78 kg/m²  Input and Output Summary (last 24 hours): Intake/Output Summary (Last 24 hours) at 4/8/2022 1130  Last data filed at 4/7/2022 2230  Gross per 24 hour   Intake 300 ml   Output 35 ml   Net 265 ml       Physical Exam:   Physical Exam  Vitals and nursing note reviewed  Constitutional:       General: He is not in acute distress  Appearance: He is obese  Neck:      Comments: Cervical collar in place, posterior MARCIN drain noted with serosanguineous drainage in bulb  Cardiovascular:      Rate and Rhythm: Normal rate  Heart sounds: Murmur heard  Pulmonary:      Breath sounds: Normal breath sounds  Abdominal:      Tenderness: There is no abdominal tenderness  Musculoskeletal:         General: Swelling present  Skin:     General: Skin is warm  Neurological:      Mental Status: He is alert and oriented to person, place, and time  Mental status is at baseline  Psychiatric:         Mood and Affect: Mood normal           Additional Data:     Labs:  Results from last 7 days   Lab Units 04/08/22  0537 04/04/22  0826 04/03/22  0051   WBC Thousand/uL 7 01   < > 6 73   HEMOGLOBIN g/dL 7 7*   < > 7 9*   HEMATOCRIT % 24 2*   < > 24 8*   PLATELETS Thousands/uL 282   < > 308   NEUTROS PCT %  --   --  65   LYMPHS PCT %  --   --  23   LYMPHO PCT % 12*  --   --    MONOS PCT %  --   --  7   MONO PCT % 3*  --   --    EOS PCT % 4  --  3    < > = values in this interval not displayed       Results from last 7 days   Lab Units 04/07/22  0515   SODIUM mmol/L 139   POTASSIUM mmol/L 3 6   CHLORIDE mmol/L 106   CO2 mmol/L 30   BUN mg/dL 9   CREATININE mg/dL 0 74   ANION GAP mmol/L 3*   CALCIUM mg/dL 8 6   GLUCOSE RANDOM mg/dL 93     Results from last 7 days   Lab Units 04/07/22  0515   INR  1 37* Results from last 7 days   Lab Units 04/01/22  1707   POC GLUCOSE mg/dl 126               Lines/Drains:  Invasive Devices  Report    Peripherally Inserted Central Catheter Line            PICC Line 03/61/74 Right Basilic 6 days          Drain            Closed/Suction Drain Posterior; Left Neck Bulb 7 Fr  6 days                Central Line:  Goal for removal: N/A - Discharging with PICC for IV ABX/medications             Imaging: No pertinent imaging reviewed      Recent Cultures (last 7 days):   Results from last 7 days   Lab Units 04/01/22  1525   GRAM STAIN RESULT  3+ Disintegrating polys*  1+ Gram positive cocci in pairs*       Last 24 Hours Medication List:   Current Facility-Administered Medications   Medication Dose Route Frequency Provider Last Rate    acetaminophen  975 mg Oral TID PRN Alison Biundo, PA-C      amLODIPine  5 mg Oral Daily Alison Biundo, PA-C      DULoxetine  60 mg Oral Daily Alison Biundo, PA-C      flecainide  100 mg Oral BID Alison Biundo, PA-C      fluticasone  1 spray Each Nare Daily DEEPTHI Wisdom      gabapentin  100 mg Oral Daily Alison Biundo, PA-C      gabapentin  300 mg Oral HS Alison Biundo, PA-C      methocarbamol  750 mg Oral Q6H PRN DEEPTHI Sanders      metoprolol succinate  100 mg Oral Daily Alison Biundo, PA-C      ondansetron  4 mg Intravenous Q6H PRN Alison Biundo, PA-C      oxyCODONE  2 5 mg Oral Q4H PRN Alison Biundo, PA-C      oxyCODONE  5 mg Oral Q4H PRN Alison Biundo, PA-C      polyethylene glycol  17 g Oral Daily PRN DEEPTHI Spears      potassium chloride  40 mEq Oral Daily Alison Biundo, PA-C      pravastatin  40 mg Oral Daily Alison Biundo, PA-C      senna  8 6 mg Oral Daily Alison Biundo, PA-C      tamsulosin  0 4 mg Oral Daily With FABIOLA Pradhan      vancomycin  1,250 mg Intravenous Q12H VEENA Ahumada-C 1,250 mg (04/07/22 2230)    warfarin  5 mg Oral Daily (warfarin) DEEPTHI Jeffery          Today, Patient Was Seen By: DEEPTHI Schmitz    **Please Note: This note may have been constructed using a voice recognition system  **

## 2022-04-08 NOTE — PLAN OF CARE
Problem: PAIN - ADULT  Goal: Verbalizes/displays adequate comfort level or baseline comfort level  Description: Interventions:  - Encourage patient to monitor pain and request assistance  - Assess pain using appropriate pain scale  - Administer analgesics based on type and severity of pain and evaluate response  - Implement non-pharmacological measures as appropriate and evaluate response  - Consider cultural and social influences on pain and pain management  - Notify physician/advanced practitioner if interventions unsuccessful or patient reports new pain  Outcome: Progressing     Problem: INFECTION - ADULT  Goal: Absence or prevention of progression during hospitalization  Description: INTERVENTIONS:  - Assess and monitor for signs and symptoms of infection  - Monitor lab/diagnostic results  - Monitor all insertion sites, i e  indwelling lines, tubes, and drains  - Monitor endotracheal if appropriate and nasal secretions for changes in amount and color  - Ovid appropriate cooling/warming therapies per order  - Administer medications as ordered  - Instruct and encourage patient and family to use good hand hygiene technique  - Identify and instruct in appropriate isolation precautions for identified infection/condition  Outcome: Progressing  Goal: Absence of fever/infection during neutropenic period  Description: INTERVENTIONS:  - Monitor WBC    Outcome: Progressing     Problem: SAFETY ADULT  Goal: Patient will remain free of falls  Description: INTERVENTIONS:  - Educate patient/family on patient safety including physical limitations  - Instruct patient to call for assistance with activity   - Consult OT/PT to assist with strengthening/mobility   - Keep Call bell within reach  - Keep bed low and locked with side rails adjusted as appropriate  - Keep care items and personal belongings within reach  - Initiate and maintain comfort rounds  - Make Fall Risk Sign visible to staff  - Offer Toileting   - Obtain necessary fall risk management equipment  - Apply yellow socks and bracelet for high fall risk patients  - Consider moving patient to room near nurses station  Outcome: Progressing  Goal: Maintain or return to baseline ADL function  Description: INTERVENTIONS:  -  Assess patient's ability to carry out ADLs; assess patient's baseline for ADL function and identify physical deficits which impact ability to perform ADLs (bathing, care of mouth/teeth, toileting, grooming, dressing, etc )  - Assess/evaluate cause of self-care deficits   - Assess range of motion  - Assess patient's mobility; develop plan if impaired  - Assess patient's need for assistive devices and provide as appropriate  - Encourage maximum independence but intervene and supervise when necessary  - Involve family in performance of ADLs  - Assess for home care needs following discharge   - Consider OT consult to assist with ADL evaluation and planning for discharge  - Provide patient education as appropriate  Outcome: Progressing  Goal: Maintains/Returns to pre admission functional level  Description: INTERVENTIONS:  - Perform BMAT or MOVE assessment daily    - Set and communicate daily mobility goal to care team and patient/family/caregiver     - Collaborate with rehabilitation services on mobility goals if consulted  - Out of bed for toileting  - Record patient progress and toleration of activity level   Outcome: Progressing     Problem: DISCHARGE PLANNING  Goal: Discharge to home or other facility with appropriate resources  Description: INTERVENTIONS:  - Identify barriers to discharge w/patient and caregiver  - Arrange for needed discharge resources and transportation as appropriate  - Identify discharge learning needs (meds, wound care, etc )  - Arrange for interpretive services to assist at discharge as needed  - Refer to Case Management Department for coordinating discharge planning if the patient needs post-hospital services based on physician/advanced practitioner order or complex needs related to functional status, cognitive ability, or social support system  Outcome: Progressing     Problem: Knowledge Deficit  Goal: Patient/family/caregiver demonstrates understanding of disease process, treatment plan, medications, and discharge instructions  Description: Complete learning assessment and assess knowledge base    Interventions:  - Provide teaching at level of understanding  - Provide teaching via preferred learning methods  Outcome: Progressing     Problem: Potential for Falls  Goal: Patient will remain free of falls  Description: INTERVENTIONS:  - Educate patient/family on patient safety including physical limitations  - Instruct patient to call for assistance with activity   - Consult OT/PT to assist with strengthening/mobility   - Keep Call bell within reach  - Keep bed low and locked with side rails adjusted as appropriate  - Keep care items and personal belongings within reach  - Initiate and maintain comfort rounds  - Make Fall Risk Sign visible to staff  - Offer [address] necessary fall risk management equipment  - Apply yellow socks and bracelet for high fall risk patients  - Consider moving patient to room near nurses station  Outcome: Progressing     Problem: MOBILITY - ADULT  Goal: Maintain or return to baseline ADL function  Description: INTERVENTIONS:  -  Assess patient's ability to carry out ADLs; assess patient's baseline for ADL function and identify physical deficits which impact ability to perform ADLs (bathing, care of mouth/teeth, toileting, grooming, dressing, etc )  - Assess/evaluate cause of self-care deficits   - Assess range of motion  - Assess patient's mobility; develop plan if impaired  - Assess patient's need for assistive devices and provide as appropriate  - Encourage maximum independence but intervene and supervise when necessary  - Involve family in performance of ADLs  - Assess for home care needs following discharge   - Consider OT consult to assist with ADL evaluation and planning for discharge  - Provide patient education as appropriate  Outcome: Progressing  Goal: Maintains/Returns to pre admission functional level  Description: INTERVENTIONS:  - Perform BMAT or MOVE assessment daily    - Set and communicate daily mobility goal to care team and patient/family/caregiver     - Collaborate with rehabilitation services on mobility goals if consulted  - Out of bed for toileting  - Record patient progress and toleration of activity level   Outcome: Progressing     Problem: Prexisting or High Potential for Compromised Skin Integrity  Goal: Skin integrity is maintained or improved  Description: INTERVENTIONS:  - Identify patients at risk for skin breakdown  - Assess and monitor skin integrity  - Assess and monitor nutrition and hydration status  - Monitor labs   - Assess for incontinence   - Turn and reposition patient  - Assist with mobility/ambulation  - Relieve pressure over bony prominences  - Avoid friction and shearing  - Provide appropriate hygiene as needed including keeping skin clean and dry  - Evaluate need for skin moisturizer/barrier cream  - Collaborate with interdisciplinary team   - Patient/family teaching  - Consider wound care consult   Outcome: Progressing     Problem: MUSCULOSKELETAL - ADULT  Goal: Maintain or return mobility to safest level of function  Description: INTERVENTIONS:  - Assess patient's ability to carry out ADLs; assess patient's baseline for ADL function and identify physical deficits which impact ability to perform ADLs (bathing, care of mouth/teeth, toileting, grooming, dressing, etc )  - Assess/evaluate cause of self-care deficits   - Assess range of motion  - Assess patient's mobility  - Assess patient's need for assistive devices and provide as appropriate  - Encourage maximum independence but intervene and supervise when necessary  - Involve family in performance of ADLs  - Assess for home care needs following discharge   - Consider OT consult to assist with ADL evaluation and planning for discharge  - Provide patient education as appropriate  Outcome: Progressing  Goal: Maintain proper alignment of affected body part  Description: INTERVENTIONS:  - Support, maintain and protect limb and body alignment  - Provide patient/ family with appropriate education  Outcome: Progressing     Problem: Nutrition/Hydration-ADULT  Goal: Nutrient/Hydration intake appropriate for improving, restoring or maintaining nutritional needs  Description: Monitor and assess patient's nutrition/hydration status for malnutrition  Collaborate with interdisciplinary team and initiate plan and interventions as ordered  Monitor patient's weight and dietary intake as ordered or per policy  Utilize nutrition screening tool and intervene as necessary  Determine patient's food preferences and provide high-protein, high-caloric foods as appropriate       INTERVENTIONS:  - Monitor oral intake, urinary output, labs, and treatment plans  - Assess nutrition and hydration status and recommend course of action  - Evaluate amount of meals eaten  - Assist patient with eating if necessary   - Allow adequate time for meals  - Recommend/ encourage appropriate diets, oral nutritional supplements, and vitamin/mineral supplements  - Order, calculate, and assess calorie counts as needed  - Recommend, monitor, and adjust tube feedings and TPN/PPN based on assessed needs  - Assess need for intravenous fluids  - Provide specific nutrition/hydration education as appropriate  - Include patient/family/caregiver in decisions related to nutrition  Outcome: Progressing

## 2022-04-08 NOTE — PROGRESS NOTES
[address] [facility]  Progress Note - Narinder Angeline 1950, 71 y o  male MRN: [id number]  Unit/Bed#: [facility] 552-85 Encounter: [id number]  Primary Care Provider: Josh Jacobs MD   Date and time admitted to hospital: 3/31/2022 10:58 AM    Cervical myelopathy ([address] )  Assessment & Plan  POD 7 posterior cervical evacuation of postoperative collection and debridement with placement of drains C3-T1  · S/p Anterior cervical discectomy and fixation fusion C5/6 and C6/7; Posterior cervical decompression and instrumented fusion C3-T1 with Dr Darylene Doss on 3/11/22  · Presented as a direct admission from the office on 03/31 with concerns for wound infection    Imaging:   · CT Cervical w/ contrast 3/31/2022: Large posterior soft tissue fluid collection, morphology suspicious for infection as clinically suspected  Study is nondiagnostic for evaluation of the central canal and epidural abscess  MR is better suited intracanalicular evaluation although will be limited by artifact from metal hardware  Plan:   Continue to monitor neurological exam and symptoms   Blood Cx no growth after 5 days    Wound cx 3/31 + MRSA   Intraop cx tissue culture 4/1 + MRSA   Watson collar to remain in place at all times, okay for [place] collar for showering   MARCIN drains:  o Left with 45cc in 24 hours  Removed today   o Right MARCIN removed 4/6/2022  · SLIM and ID consulted - appreciate recommendations  · Patient will require 4 weeks of IV vancomycin followed by 4 weeks of p o  antibiotics  · PICC placed  · Maintain vancomycin 1,5000 mg q12h per ID with pharmacy dosing  · Continue to monitor incision  · Pain well controlled on current pain regimen:  · Gabapentin 100mg daily and 300mg at HS  · Tylenol 975 mg TID prn  · Robaxin 750 mg q 6 hours p r n   · Oxycodone 2 5-5mg q 4 hours p r n  For moderate or severe pain  · Bowel regimen: Senokot, and MiraLax  · Mobilize with PT/OT    Home with outpatient rehab  · Mobilize as tolerated  Out of bed to chair with meals  · DVT ppx: SCDs, on heparin gtt  · Plan of care discussed with MIREILLE Waters  Neurosurgery will follow as primary, call with any further questions or concerns  Factor V Leiden mutation ([address] )  Assessment & Plan  · On heparin gtt, PTT this am was 93  · No active signs of bleeding, continue to monitor incision  · Plan to bridge to Coumadin once drains are discontinued      Subjective/Objective   Chief Complaint: None     Subjective: Patient without significant pain at this time  Incision with blottable redness  MARCIN drain removed  At neurological baseline  Objective: laying in bed in NAD    I/O       04/06 0701 04/07 0700 04/07 0701 04/08 0700 04/08 0701 04/09 0700    P  O  1560 300     I V  (mL/kg)       IV Piggyback       Total Intake(mL/kg) 1560 (14 6) 300 (2 8)     Urine (mL/kg/hr) 1525 (0 6) 600 (0 2) 450 (0 5)    Drains 68 45     Stool       Total Output 1593 645 450    Net -33 -345 -450           Unmeasured Urine Occurrence 2 x 1 x           Invasive Devices  Report    Peripherally Inserted Central Catheter Line            PICC Line 64/49/08 Right Basilic 6 days          Drain            Closed/Suction Drain Posterior; Left Neck Bulb 7 Fr  6 days                Physical Exam:  Vitals: Blood pressure 150/72, pulse 63, temperature 98 5 °F (36 9 °C), resp  rate 19, height 5' 11" (1 803 m), weight 107 kg (235 lb), SpO2 93 %  ,Body mass index is 32 78 kg/m²      General appearance: alert, appears stated age, cooperative and no distress  Head: Normocephalic, without obvious abnormality, atraumatic  Eyes: tracking appropriately   Neck: supple, symmetrical, trachea midline   Back: no kyphosis present  Lungs: non labored breathing  Heart: regular heart rate  Neurologic:   Mental status: Alert, oriented x3, thought content appropriate  Cranial nerves: grossly intact (Cranial nerves II-XII)  Sensory: normal to light touch   Motor: moving all extremities without focal weakness 5/5 strength   Reflexes: 2+ and symmetric    Lab Results:  Results from last 7 days   Lab Units 04/08/22  0537 04/04/22  0826 04/03/22  0051   WBC Thousand/uL 7 01 7 48 6 73   HEMOGLOBIN g/dL 7 7* 8 4* 7 9*   HEMATOCRIT % 24 2* 25 3* 24 8*   PLATELETS Thousands/uL 282 318 308   NEUTROS PCT %  --   --  65   MONOS PCT %  --   --  7   MONO PCT % 3*  --   --      Results from last 7 days   Lab Units 04/07/22  0515 04/05/22  1359 04/03/22  0051   POTASSIUM mmol/L 3 6 3 8 4 0   CHLORIDE mmol/L 106 106 106   CO2 mmol/L 30 31 29   BUN mg/dL 9 10 17   CREATININE mg/dL 0 74 0 64 0 72   CALCIUM mg/dL 8 6 8 9 8 4             Results from last 7 days   Lab Units 04/08/22  0537 04/07/22  2207 04/07/22  1446 04/07/22  0515 04/07/22  0515 04/05/22  0545 04/04/22  0826 04/02/22  1801 04/02/22  1041   INR   --   --   --   --  1 37*  --  1 39*  --  1 44*   PTT seconds 93* 99* 37   < > >210*   < > 79*   < > 36    < > = values in this interval not displayed  No results found for: TROPONINT  ABG:No results found for: PHART, EHY6STW, PO2ART, JLN7DLU, G5FXRIPR, BEART, SOURCE    Imaging Studies: I have personally reviewed pertinent reports  and I have personally reviewed pertinent films in PACS  CT spine cervical w contrast    Result Date: 3/31/2022  Impression: Large posterior soft tissue fluid collection, morphology suspicious for infection as clinically suspected  Study is nondiagnostic for evaluation of the central canal and epidural abscess  MR is better suited intracanalicular evaluation although will be limited by artifact from metal hardware  No specific findings of osteomyelitis  Workstation performed: [id number]       EKG, Pathology, and Other Studies: I have personally reviewed pertinent reports        VTE Pharmacologic Prophylaxis: Heparin gtt    VTE Mechanical Prophylaxis: sequential compression device

## 2022-04-08 NOTE — PROGRESS NOTES
Progress Note - Infectious Disease   Gilberto Jennings 71 y o  male MRN: [id number]  Unit/Bed#: [facility] 608-01 Encounter: [id number]      Impression:  1  Postoperative MRSA cervical wound infection R/0 vertebral osteomyelitis S/P posterior cervical a VAC UA shin of postoperative collection and debridement with placement of drains C3-T1 POD 7  2  S/P  anterior cervical diskectomy and fixation fusion C5-6 and C6-7 posterior cervical decompression and instrumented fusion C3-T1 on 22  3  History of PAF on Coumadin  4  Factor 5 Leiden mutation  5  WILL    Recommendations:  Afebrile with normal WBC count   Wound dressing is dry and pictures 4/7 show decreased inflammation without purulent discharge  One drain remains and is expected to be removed shortly  1  Final cultures are showing MRSA  2  Continue vancomycin IV with further adjustment of dose by pharmacy  3  Would anticipate at least a four-week IV vancomycin course followed by approximately 4 weeks of p o  Rx         Antibiotics:  1  Vancomycin 1250 mg q 12 hours IV, day 8 of 28 Rx    Subjective:  Discomfort left lateral neck minimal    Denies fevers, chills, or sweats  Denies nausea, vomiting, or diarrhea  Objective:  Vitals:  Temp:  [97 8 °F (36 6 °C)-98 2 °F (36 8 °C)] 97 8 °F (36 6 °C)  HR:  [57-63] 63  Resp:  [17-19] 19  BP: (127-163)/(64-73) 156/67  SpO2:  [93 %-96 %] 93 %  Temp (24hrs), Av °F (36 7 °C), Min:97 8 °F (36 6 °C), Max:98 2 °F (36 8 °C)  Current: Temperature: 97 8 °F (36 6 °C)    Physical Exam:     General Appearance:  Alert, nontoxic, no acute distress  Vista collar in place  Posterior cervical wound with 1 MARCIN drain and 1+ surrounding erythema  Picture noted  Appears comfortable sitting in chair   Throat: Oropharynx moist without lesions  Lips, mucosa, and tongue normal   Neck: [place] collar with posterior cervical wound with staples and retention sutures and 1 MARCIN drain with modest serosanguineous drainage  Dressing is dry    1+ surrounding erythema as per picture on chart   Lungs:   Clear to auscultation bilaterally, no audible wheezes, rhonchi or rales; respirations unlabored   Heart:  Regular rate and rhythm, S1, S2 normal, no murmur, rub or gallop   Abdomen:   Soft, non-tender, surgical scars non-distended, positive bowel sounds  No masses, no organomegaly    No CVA tenderness   Extremities: Extremities normal, atraumatic, no clubbing, cyanosis or edema  PICC line right basilic   Skin: As above, surgical scar  Invasive Devices  Report    Peripherally Inserted Central Catheter Line            PICC Line 78/77/26 Right Basilic 6 days          Drain            Closed/Suction Drain Posterior; Left Neck Bulb 7 Fr  6 days                Labs, Imaging, & Other studies:   All pertinent labs were personally reviewed  Results from last 7 days   Lab Units 04/08/22  0537 04/04/22  0826 04/03/22  0051   WBC Thousand/uL 7 01 7 48 6 73   HEMOGLOBIN g/dL 7 7* 8 4* 7 9*   PLATELETS Thousands/uL 282 318 308     Results from last 7 days   Lab Units 04/07/22  0515 04/05/22  1359 04/05/22  1359 04/03/22  0051 04/03/22  0051   SODIUM mmol/L 139  --  139  --  138   POTASSIUM mmol/L 3 6   < > 3 8   < > 4 0   CHLORIDE mmol/L 106   < > 106   < > 106   CO2 mmol/L 30   < > 31   < > 29   BUN mg/dL 9   < > 10   < > 17   CREATININE mg/dL 0 74   < > 0 64   < > 0 72   EGFR ml/min/1 73sq m 92   < > 98   < > 93   CALCIUM mg/dL 8 6   < > 8 9   < > 8 4    < > = values in this interval not displayed       Results from last 7 days   Lab Units 04/01/22  1525   GRAM STAIN RESULT  3+ Disintegrating polys*  1+ Gram positive cocci in pairs*

## 2022-04-08 NOTE — ASSESSMENT & PLAN NOTE
On coumadin outpatient (home regimen 2 5 mg daily except Wed/Sat takes 5 mg daily)   Last dose 3/31  · Heparin gtt started on 4/2 per neurosurgery (per my d/w NSx they would like patient on ACS low protocol NOT VTE/High given recent surgery) okay to start Coumadin this evening per my discussion with Neurosurgery  · Second MARCIN drain to be removed today    · Monitor PT/INR

## 2022-04-08 NOTE — PROGRESS NOTES
Vancomycin IV Pharmacy-to-Dose Consultation    Paulette Olmedo is a 71 y o  male who is currently receiving Vancomycin IV with management by the Pharmacy Consult service  Assessment/Plan:  The patient was reviewed  Renal function is stable and no signs or symptoms of nephrotoxicity and/or infusion reactions were documented in the chart  Based on todays assessment, continue current vancomycin (day # 8) dosing of 1250mg iv q12h, with a plan for trough to be drawn at 1115 on 4/9  We will continue to follow the patients culture results and clinical progress daily      Pham Dodd, Pharmacist

## 2022-04-08 NOTE — ASSESSMENT & PLAN NOTE
Hx of cervical myelopathy, s/p anterior cervical discectomy and fixation fusion C5/6 and C6/7; Posterior cervical decompression and instrumented fusion C3-T1 with Dr Fito Robison on 3/11/22  Discharged to [facility] for rehab on 3/18, then discharged home on 3/29  Noted to have increased redness and drainage at incision site, no improvement after 6 days of Keflex  Referred to ED from OP office visit for further management  · CT cervical spine: Large posterior soft tissue fluid collection, morphology suspicious for infection as clinically suspected  Study is nondiagnostic for evaluation of the central canal and epidural abscess  No specific findings of osteomyelitis  · Admitted under neurosurgery service  · Status post operative washout on 4/1   · ID following and managing antibiotics  · Wound cultures and OR cultures are growing MRSA   · Currently on vancomycin, will need 4 weeks IV antibiotics followed by similar course of p o   Antibiotics, PICC line in place  · Blood cultures with no growth to date   · Analgesics as needed   · PT/OT recommending outpatient rehabilitation once medically stable

## 2022-04-08 NOTE — NUTRITION
04/08/22 1153   Recommendations/Interventions   Intervention Comments Likely meeting lower end of protein needs at this time d/t increased needs r/t wound healing; Unable to speak w/ pt at this time d/t pt not present in room at time of visit   Recommendations to Provider Consider initiating Ensure Max QD to increase protein intake and promote wound healing; Please order A1C for possible T2DM (H/O abnormal FBG);  Monitor BG lvls and initiate CCD-2 if BG lvls are elevated

## 2022-04-09 ENCOUNTER — APPOINTMENT (INPATIENT)
Dept: RADIOLOGY | Facility: HOSPITAL | Age: 72
DRG: 501 | End: 2022-04-09
Payer: COMMERCIAL

## 2022-04-09 PROBLEM — R51.9 HEADACHE: Status: ACTIVE | Noted: 2022-04-09

## 2022-04-09 LAB
APTT PPP: 40 SECONDS (ref 23–37)
APTT PPP: 52 SECONDS (ref 23–37)
APTT PPP: 67 SECONDS (ref 23–37)
APTT PPP: 67 SECONDS (ref 23–37)
BASOPHILS # BLD AUTO: 0.06 THOUSANDS/ΜL (ref 0–0.1)
BASOPHILS NFR BLD AUTO: 1 % (ref 0–1)
EOSINOPHIL # BLD AUTO: 0.2 THOUSAND/ΜL (ref 0–0.61)
EOSINOPHIL NFR BLD AUTO: 3 % (ref 0–6)
ERYTHROCYTE [DISTWIDTH] IN BLOOD BY AUTOMATED COUNT: 13.6 % (ref 11.6–15.1)
HCT VFR BLD AUTO: 22.9 % (ref 36.5–49.3)
HGB BLD-MCNC: 7.4 G/DL (ref 12–17)
IMM GRANULOCYTES # BLD AUTO: 0.07 THOUSAND/UL (ref 0–0.2)
IMM GRANULOCYTES NFR BLD AUTO: 1 % (ref 0–2)
INR PPP: 1.14 (ref 0.84–1.19)
LYMPHOCYTES # BLD AUTO: 1.44 THOUSANDS/ΜL (ref 0.6–4.47)
LYMPHOCYTES NFR BLD AUTO: 19 % (ref 14–44)
MCH RBC QN AUTO: 30.5 PG (ref 26.8–34.3)
MCHC RBC AUTO-ENTMCNC: 32.3 G/DL (ref 31.4–37.4)
MCV RBC AUTO: 94 FL (ref 82–98)
MONOCYTES # BLD AUTO: 0.64 THOUSAND/ΜL (ref 0.17–1.22)
MONOCYTES NFR BLD AUTO: 8 % (ref 4–12)
NEUTROPHILS # BLD AUTO: 5.2 THOUSANDS/ΜL (ref 1.85–7.62)
NEUTS SEG NFR BLD AUTO: 68 % (ref 43–75)
NRBC BLD AUTO-RTO: 0 /100 WBCS
PLATELET # BLD AUTO: 269 THOUSANDS/UL (ref 149–390)
PMV BLD AUTO: 9.7 FL (ref 8.9–12.7)
PROTHROMBIN TIME: 14.2 SECONDS (ref 11.6–14.5)
RBC # BLD AUTO: 2.43 MILLION/UL (ref 3.88–5.62)
URATE SERPL-MCNC: 4.8 MG/DL (ref 4.2–8)
VANCOMYCIN TROUGH SERPL-MCNC: 19.6 UG/ML (ref 10–20)
WBC # BLD AUTO: 7.61 THOUSAND/UL (ref 4.31–10.16)

## 2022-04-09 PROCEDURE — 84550 ASSAY OF BLOOD/URIC ACID: CPT | Performed by: INTERNAL MEDICINE

## 2022-04-09 PROCEDURE — 85025 COMPLETE CBC W/AUTO DIFF WBC: CPT | Performed by: NURSE PRACTITIONER

## 2022-04-09 PROCEDURE — 85730 THROMBOPLASTIN TIME PARTIAL: CPT | Performed by: HOSPITALIST

## 2022-04-09 PROCEDURE — 80202 ASSAY OF VANCOMYCIN: CPT | Performed by: INTERNAL MEDICINE

## 2022-04-09 PROCEDURE — 85610 PROTHROMBIN TIME: CPT | Performed by: NURSE PRACTITIONER

## 2022-04-09 PROCEDURE — 85730 THROMBOPLASTIN TIME PARTIAL: CPT | Performed by: NEUROLOGICAL SURGERY

## 2022-04-09 PROCEDURE — 73630 X-RAY EXAM OF FOOT: CPT

## 2022-04-09 PROCEDURE — 99024 POSTOP FOLLOW-UP VISIT: CPT | Performed by: PHYSICIAN ASSISTANT

## 2022-04-09 PROCEDURE — 99232 SBSQ HOSP IP/OBS MODERATE 35: CPT | Performed by: NURSE PRACTITIONER

## 2022-04-09 PROCEDURE — 99232 SBSQ HOSP IP/OBS MODERATE 35: CPT | Performed by: INTERNAL MEDICINE

## 2022-04-09 RX ORDER — MELOXICAM 7.5 MG/1
15 TABLET ORAL DAILY
Status: COMPLETED | OUTPATIENT
Start: 2022-04-09 | End: 2022-04-11

## 2022-04-09 RX ORDER — MAGNESIUM SULFATE HEPTAHYDRATE 40 MG/ML
2 INJECTION, SOLUTION INTRAVENOUS ONCE
Status: DISCONTINUED | OUTPATIENT
Start: 2022-04-09 | End: 2022-04-12

## 2022-04-09 RX ORDER — METOCLOPRAMIDE HYDROCHLORIDE 5 MG/ML
10 INJECTION INTRAMUSCULAR; INTRAVENOUS ONCE
Status: DISCONTINUED | OUTPATIENT
Start: 2022-04-09 | End: 2022-04-12

## 2022-04-09 RX ADMIN — AMLODIPINE BESYLATE 5 MG: 5 TABLET ORAL at 09:20

## 2022-04-09 RX ADMIN — WARFARIN SODIUM 5 MG: 5 TABLET ORAL at 17:05

## 2022-04-09 RX ADMIN — FLECAINIDE ACETATE 100 MG: 100 TABLET ORAL at 17:05

## 2022-04-09 RX ADMIN — VANCOMYCIN HYDROCHLORIDE 1250 MG: 10 INJECTION, POWDER, LYOPHILIZED, FOR SOLUTION INTRAVENOUS at 23:03

## 2022-04-09 RX ADMIN — TAMSULOSIN HYDROCHLORIDE 0.4 MG: 0.4 CAPSULE ORAL at 15:48

## 2022-04-09 RX ADMIN — OXYCODONE HYDROCHLORIDE 5 MG: 5 TABLET ORAL at 17:15

## 2022-04-09 RX ADMIN — METHOCARBAMOL TABLETS 750 MG: 750 TABLET, COATED ORAL at 09:15

## 2022-04-09 RX ADMIN — METHOCARBAMOL TABLETS 750 MG: 750 TABLET, COATED ORAL at 17:15

## 2022-04-09 RX ADMIN — VANCOMYCIN HYDROCHLORIDE 1250 MG: 10 INJECTION, POWDER, LYOPHILIZED, FOR SOLUTION INTRAVENOUS at 11:52

## 2022-04-09 RX ADMIN — ACETAMINOPHEN 975 MG: 325 TABLET ORAL at 21:04

## 2022-04-09 RX ADMIN — MELOXICAM 15 MG: 7.5 TABLET ORAL at 19:53

## 2022-04-09 RX ADMIN — HEPARIN SODIUM 17.1 UNITS/KG/HR: 10000 INJECTION, SOLUTION INTRAVENOUS; SUBCUTANEOUS at 11:57

## 2022-04-09 RX ADMIN — GABAPENTIN 100 MG: 100 CAPSULE ORAL at 09:21

## 2022-04-09 RX ADMIN — PRAVASTATIN SODIUM 40 MG: 40 TABLET ORAL at 09:21

## 2022-04-09 RX ADMIN — POTASSIUM CHLORIDE 40 MEQ: 20 TABLET, EXTENDED RELEASE ORAL at 09:20

## 2022-04-09 RX ADMIN — METOPROLOL SUCCINATE 100 MG: 100 TABLET, EXTENDED RELEASE ORAL at 09:20

## 2022-04-09 RX ADMIN — ACETAMINOPHEN 975 MG: 325 TABLET ORAL at 09:15

## 2022-04-09 RX ADMIN — FLECAINIDE ACETATE 100 MG: 100 TABLET ORAL at 09:21

## 2022-04-09 RX ADMIN — GABAPENTIN 300 MG: 300 CAPSULE ORAL at 21:04

## 2022-04-09 RX ADMIN — OXYCODONE HYDROCHLORIDE 5 MG: 5 TABLET ORAL at 04:53

## 2022-04-09 RX ADMIN — DULOXETINE HYDROCHLORIDE 60 MG: 60 CAPSULE, DELAYED RELEASE ORAL at 09:20

## 2022-04-09 RX ADMIN — FLUTICASONE PROPIONATE 1 SPRAY: 50 SPRAY, METERED NASAL at 09:21

## 2022-04-09 RX ADMIN — SENNOSIDES 8.6 MG: 8.6 TABLET, FILM COATED ORAL at 09:21

## 2022-04-09 NOTE — PROGRESS NOTES
Vancomycin Assessment    Quincy White is a 71 y o  male who is currently receiving vancomycin 1500 mg q12h for osteomyelitis bone/joint   Relevant clinical data and objective history reviewed:  Creatinine   Date Value Ref Range Status   04/07/2022 0 74 0 60 - 1 30 mg/dL Final     Comment:     Standardized to IDMS reference method   04/05/2022 0 64 0 60 - 1 30 mg/dL Final     Comment:     Standardized to IDMS reference method   04/03/2022 0 72 0 60 - 1 30 mg/dL Final     Comment:     Standardized to IDMS reference method   10/25/2017 1 24 0 76 - 1 27 mg/dL Final   11/03/2016 1 11 0 76 - 1 27 mg/dL Final   01/23/2015 1 20 0 60 - 1 30 mg/dL Final     Comment:     Standardized to IDMS reference method     /65   Pulse 63   Temp 98 1 °F (36 7 °C)   Resp 16   Ht 5' 11" (1 803 m)   Wt 107 kg (235 lb)   SpO2 92%   BMI 32 78 kg/m²   I/O last 3 completed shifts: In: 550 [P O :300; IV Piggyback:250]  Out: 736 [Urine:750; Drains:35]  Lab Results   Component Value Date/Time    BUN 9 04/07/2022 05:15 AM    BUN 17 11/02/2021 01:02 PM    WBC 7 61 04/09/2022 06:32 AM    WBC 8 43 01/23/2015 04:40 AM    HGB 7 4 (L) 04/09/2022 06:32 AM    HGB 12 9 01/23/2015 04:40 AM    HCT 22 9 (L) 04/09/2022 06:32 AM    HCT 39 5 01/23/2015 04:40 AM    MCV 94 04/09/2022 06:32 AM    MCV 93 01/23/2015 04:40 AM     04/09/2022 06:32 AM     01/23/2015 04:40 AM     Temp Readings from Last 3 Encounters:   04/09/22 98 1 °F (36 7 °C)   03/31/22 (!) 96 4 °F (35 8 °C) (Tympanic)   03/29/22 97 7 °F (36 5 °C) (Oral)     Vancomycin Days of Therapy: 9    Assessment/Plan  The patient is currently on vancomycin utilizing scheduled dosing  Baseline risks associated with therapy include: pre-existing renal impairment, concomitant nephrotoxic medications, advanced age, and dehydration    The patient is receiving vancomycin 1500 mg q12h with the most recent vancomycin level being 19 6 at steady-state and therapeutic based on a goal of 15-20 (appropriate for most indications) ; therefore, is clinically appropriate and dose will be continued   Pharmacy will continue to follow closely for s/sx of nephrotoxicity, infusion reactions, and appropriateness of therapy  BMP and CBC will be ordered per protocol  Plan for next trough at approximately 1100 on 4/12/22  Pharmacy will continue to follow the patients culture results and clinical progress daily      Eduarda Marte, Pharmacist

## 2022-04-09 NOTE — PROGRESS NOTES
[address] [facility]  Progress Note - Darlys Ready 1950, 71 y o  male MRN: [id number]  Unit/Bed#: [facility] 626-11 Encounter: [id number]  Primary Care Provider: Esequiel Miramontes MD   Date and time admitted to hospital: 3/31/2022 10:58 AM    Cervical myelopathy ([address] )  Assessment & Plan  POD 8 posterior cervical evacuation of postoperative collection and debridement with placement of drains C3-T1  · S/p Anterior cervical discectomy and fixation fusion C5/6 and C6/7; Posterior cervical decompression and instrumented fusion C3-T1 with Dr Juventino Wallace on 3/11/22  · Presented as a direct admission from the office on 03/31 with concerns for wound infection    Imaging:   · CT Cervical w/ contrast 3/31/2022: Large posterior soft tissue fluid collection, morphology suspicious for infection as clinically suspected  Study is nondiagnostic for evaluation of the central canal and epidural abscess  MR is better suited intracanalicular evaluation although will be limited by artifact from metal hardware  Plan:   Continue to monitor neurological exam and symptoms   Blood Cx no growth after 5 days    Wound cx 3/31 + MRSA   Intraop cx tissue culture 4/1 + MRSA   [place] collar to remain in place at all times, okay for [place] collar for showering   MARCIN drains:  o Left MARCIN removed 4/8/2022  o Right MARCIN removed 4/6/2022  · SLIM and ID consulted - appreciate recommendations  · Patient will require 4 weeks of IV vancomycin followed by 4 weeks of p o  antibiotics  · PICC in place  · Maintain vancomycin 1,5000 mg q12h per ID with pharmacy dosing  · Continue to monitor incision  · Pain well controlled on current pain regimen:  · Gabapentin 100mg daily and 300mg at HS  · Tylenol 975 mg TID prn  · Robaxin 750 mg q 6 hours p r n   · Oxycodone 2 5-5mg q 4 hours p r n  For moderate or severe pain  · Bowel regimen: Senokot, and MiraLax  · Mobilize with PT/OT  Home with outpatient rehab  · Mobilize as tolerated  Out of bed to chair with meals  · DVT ppx: SCDs, on heparin gtt- plan to initiate coumadin this morning   · Plan of care discussed with MIREILLE Carreon  Neurosurgery will follow as primary, call with any further questions or concerns  Factor V Leiden mutation ([address] )  Assessment & Plan  · On heparin gtt, PTT this am was 93  · No active signs of bleeding, continue to monitor incision  · Plan to bridge to Coumadin once drains are discontinued      Subjective/Objective   Chief Complaint: Headache     Subjective:  Patient completing the headache this morning which she states started overnight  States he has been getting these headaches for the last nights seemed only be occurring at that time  He denies any history of headaches or migraines  He also complains of some distal metatarsal pain with some associated erythema and tenderness  Spoke with medical team personally  Objective: laying in bed in NAD    I/O       04/07 0701  04/08 0700 04/08 0701  04/09 0700 04/09 0701  04/10 0700    P  O  300      I V  (mL/kg)   170 (1 6)    IV Piggyback  250     Total Intake(mL/kg) 300 (2 8) 250 (2 3) 170 (1 6)    Urine (mL/kg/hr) 600 (0 2) 750 (0 3)     Drains 45      Total Output 645 750     Net -345 -500 +170           Unmeasured Urine Occurrence 1 x            Invasive Devices  Report    Peripherally Inserted Central Catheter Line            PICC Line 19/65/40 Right Basilic 6 days                Physical Exam:  Vitals: Blood pressure 144/65, pulse 63, temperature 98 1 °F (36 7 °C), resp  rate 16, height 5' 11" (1 803 m), weight 107 kg (235 lb), SpO2 92 %  ,Body mass index is 32 78 kg/m²  General appearance: alert, appears stated age, cooperative and no distress  Head: Normocephalic, without obvious abnormality  Eyes:  Tracking appropriately  Neck: supple, symmetrical, trachea midline  Minimal midline tenderness  No significant drainage noted on ABD  Dressing changed again this morning   Incisional erythema appears to have decreased compared to yesterday   Back: no kyphosis present  Lungs: non labored breathing  Heart: regular heart rate  Neurologic:   Mental status: Alert, oriented x3, thought content appropriate  Cranial nerves: grossly intact (Cranial nerves II-XII)  Sensory: normal to light touch   Motor: moving all extremities without focal weakness 5/5    Lab Results:  Results from last 7 days   Lab Units 04/09/22  0632 04/08/22  1724 04/08/22  0537 04/04/22  0826 04/03/22  0051   WBC Thousand/uL 7 61 7 24 7 01   < > 6 73   HEMOGLOBIN g/dL 7 4* 8 4*  8 5* 7 7*   < > 7 9*   HEMATOCRIT % 22 9* 25 9*  26 4* 24 2*   < > 24 8*   PLATELETS Thousands/uL 269 330 282   < > 308   NEUTROS PCT % 68  --   --   --  65   MONOS PCT % 8  --   --   --  7   MONO PCT %  --   --  3*  --   --     < > = values in this interval not displayed  Results from last 7 days   Lab Units 04/07/22  0515 04/05/22  1359 04/03/22  0051   POTASSIUM mmol/L 3 6 3 8 4 0   CHLORIDE mmol/L 106 106 106   CO2 mmol/L 30 31 29   BUN mg/dL 9 10 17   CREATININE mg/dL 0 74 0 64 0 72   CALCIUM mg/dL 8 6 8 9 8 4             Results from last 7 days   Lab Units 04/09/22  0632 04/08/22  2326 04/08/22  1724 04/07/22  1446 04/07/22  0515   INR  1 14  --  1 24*  --  1 37*   PTT seconds 52* 40* 32   < > >210*    < > = values in this interval not displayed  No results found for: TROPONINT  ABG:No results found for: PHART, YOS3SVC, PO2ART, CPV5HKK, Q1CFFXKX, BEART, SOURCE    Imaging Studies: I have personally reviewed pertinent reports  and I have personally reviewed pertinent films in PACS  CT spine cervical w contrast    Result Date: 3/31/2022  Impression: Large posterior soft tissue fluid collection, morphology suspicious for infection as clinically suspected  Study is nondiagnostic for evaluation of the central canal and epidural abscess  MR is better suited intracanalicular evaluation although will be limited by artifact from metal hardware   No specific findings of osteomyelitis  Workstation performed: [id number]       EKG, Pathology, and Other Studies: I have personally reviewed pertinent reports        VTE Pharmacologic Prophylaxis: Heparin gtt    VTE Mechanical Prophylaxis: sequential compression device

## 2022-04-09 NOTE — ASSESSMENT & PLAN NOTE
Hx of cervical myelopathy, s/p anterior cervical discectomy and fixation fusion C5/6 and C6/7; Posterior cervical decompression and instrumented fusion C3-T1 with Dr Soumya Hensley on 3/11/22  Discharged to [facility] for rehab on 3/18, then discharged home on 3/29  Noted to have increased redness and drainage at incision site, no improvement after 6 days of Keflex  Referred to ED from OP office visit for further management  · CT cervical spine: Large posterior soft tissue fluid collection, morphology suspicious for infection as clinically suspected  Study is nondiagnostic for evaluation of the central canal and epidural abscess  No specific findings of osteomyelitis  · Admitted under neurosurgery service  · Status post operative washout on 4/1   · ID following and managing antibiotics  · Wound cultures and OR cultures are growing MRSA   · Currently on vancomycin, will need 4 weeks IV antibiotics followed by similar course of p o  Antibiotics, PICC line in place  · Blood cultures with no growth to date   · Analgesics as needed   · PT/OT recommending outpatient rehabilitation once medically stable  Case management aware of need for VNA for home intravenous antibiotics

## 2022-04-09 NOTE — ASSESSMENT & PLAN NOTE
On coumadin outpatient (home regimen 2 5 mg daily except Wed/Sat takes 5 mg daily)   Last dose 3/31  · Heparin gtt started on 4/2 per neurosurgery (per my d/w NSx they would like patient on ACS low protocol NOT VTE/High given recent surgery)   · Both MARCIN drains out as 4/8    · Cleared to start Coumadin 4/9  · Monitor PT/INR

## 2022-04-09 NOTE — PROGRESS NOTES
Progress Note - Infectious Disease   Gilberto Jennings 71 y o  male MRN: [id number]  Unit/Bed#: [facility] 608-01 Encounter: [id number]      Impression:  1  Postoperative MRSA cervical wound infection R/0 vertebral osteomyelitis S/P posterior cervical a VAC UA shin of postoperative collection and debridement with placement of drains C3-T1 POD 8  2  S/P  anterior cervical diskectomy and fixation fusion C5-6 and C6-7 posterior cervical decompression and instrumented fusion C3-T1 on 22  3  History of PAF on Coumadin  4  Factor 5 Leiden mutation  5  WILL    Recommendations:  Afebrile with normal WBC count   Hemoglobin is 7 4 Wound dressing is dry and pictures on patient's phone today  show trace inflammation without discharge  Last MARCIN drain was removed yesterday  1  Final cultures are showing MRSA  2  Continue vancomycin IV with further adjustment of dose by pharmacy  3  Would anticipate at least a four-week IV vancomycin course followed by approximately 4 weeks of p o  Rx   4  Patient to have uric acid to evaluate right 1st MTP joint pain  5  Discussed with patient and his wife who is present         Antibiotics:  1  Vancomycin 1 5 q 12 hours IV, day 9 of 28 Rx    Subjective:  Has twinges of left lateral neck discomfort  Now has pain with some swelling of his right 1st MTP joint    Denies fevers, chills, or sweats  Denies nausea, vomiting, or diarrhea  Objective:  Vitals:  Temp:  [97 3 °F (36 3 °C)-98 1 °F (36 7 °C)] 97 8 °F (36 6 °C)  HR:  [57-63] 57  Resp:  [16] 16  BP: (133-144)/(65-66) 133/65  SpO2:  [92 %-94 %] 93 %  Temp (24hrs), Av 7 °F (36 5 °C), Min:97 3 °F (36 3 °C), Max:98 1 °F (36 7 °C)  Current: Temperature: 97 8 °F (36 6 °C)    Physical Exam:     General Appearance:  Alert, nontoxic, no acute distress  [place] collar in place  Posterior cervical wound with former MARCIN drain sites clean and dry   Picture noted on patient's phone    Appears comfortable sitting in chair   Throat: Oropharynx moist without lesions  Lips, mucosa, and tongue normal   Neck: [place] collar with posterior cervical wound with staples and retention sutures and 1 MARCIN drain with modest serosanguineous drainage  Dressing is dry  1+ surrounding erythema as per picture on chart   Lungs:   Clear to auscultation bilaterally, no audible wheezes, rhonchi or rales; respirations unlabored   Heart:  Regular rate and rhythm, S1, S2 normal, no murmur, rub or gallop   Abdomen:   Soft, non-tender, surgical scars non-distended, positive bowel sounds  No masses, no organomegaly    No CVA tenderness   Extremities: Painful swelling right 1st MTP joint, no clubbing, cyanosis or edema  PICC line right basilic   Skin: As above, surgical scar  Invasive Devices  Report    Peripherally Inserted Central Catheter Line            PICC Line 07/34/49 Right Basilic 7 days                Labs, Imaging, & Other studies:   All pertinent labs were personally reviewed  Results from last 7 days   Lab Units 04/09/22  0632 04/08/22  1724 04/08/22  0537   WBC Thousand/uL 7 61 7 24 7 01   HEMOGLOBIN g/dL 7 4* 8 4*  8 5* 7 7*   PLATELETS Thousands/uL 269 330 282     Results from last 7 days   Lab Units 04/07/22  0515 04/05/22  1359 04/05/22  1359 04/03/22  0051 04/03/22  0051   SODIUM mmol/L 139  --  139  --  138   POTASSIUM mmol/L 3 6   < > 3 8   < > 4 0   CHLORIDE mmol/L 106   < > 106   < > 106   CO2 mmol/L 30   < > 31   < > 29   BUN mg/dL 9   < > 10   < > 17   CREATININE mg/dL 0 74   < > 0 64   < > 0 72   EGFR ml/min/1 73sq m 92   < > 98   < > 93   CALCIUM mg/dL 8 6   < > 8 9   < > 8 4    < > = values in this interval not displayed

## 2022-04-09 NOTE — QUICK NOTE
Patient has lower ext swelling that is new, redness noted  Plan:   Obtain uric acid levels  Orthopedic consult to see if needs drainage  mobic 15 mg daily for three days  Would change to steroid or NSAID, closely monitor renal function

## 2022-04-09 NOTE — ASSESSMENT & PLAN NOTE
Appears to be postop anemia since recent surgery in March, hemoglobin between 9-10 since that time and previously within normal limits   · Hemoglobin was down-trending since operative washout, no signs or symptoms of active bleeding   · folate and B12 WNL  · Low Fe level however elevated ferritin  · Hemoglobin has been fluctuating but overall stable  Continue monitor and transfuse as needed

## 2022-04-09 NOTE — ASSESSMENT & PLAN NOTE
POD 8 posterior cervical evacuation of postoperative collection and debridement with placement of drains C3-T1  · S/p Anterior cervical discectomy and fixation fusion C5/6 and C6/7; Posterior cervical decompression and instrumented fusion C3-T1 with Dr Fito Robison on 3/11/22  · Presented as a direct admission from the office on 03/31 with concerns for wound infection    Imaging:   · CT Cervical w/ contrast 3/31/2022: Large posterior soft tissue fluid collection, morphology suspicious for infection as clinically suspected  Study is nondiagnostic for evaluation of the central canal and epidural abscess  MR is better suited intracanalicular evaluation although will be limited by artifact from metal hardware  Plan:   Continue to monitor neurological exam and symptoms   Blood Cx no growth after 5 days    Wound cx 3/31 + MRSA   Intraop cx tissue culture 4/1 + MRSA   [place] collar to remain in place at all times, okay for [place] collar for showering   MARCIN drains:  o Left MARCIN removed 4/8/2022  o Right MARCIN removed 4/6/2022  · SLIM and ID consulted - appreciate recommendations  · Patient will require 4 weeks of IV vancomycin followed by 4 weeks of p o  antibiotics  · PICC in place  · Maintain vancomycin 1,5000 mg q12h per ID with pharmacy dosing  · Continue to monitor incision  · Pain well controlled on current pain regimen:  · Gabapentin 100mg daily and 300mg at HS  · Tylenol 975 mg TID prn  · Robaxin 750 mg q 6 hours p r n   · Oxycodone 2 5-5mg q 4 hours p r n  For moderate or severe pain  · Bowel regimen: Senokot, and MiraLax  · Mobilize with PT/OT  Home with outpatient rehab  · Mobilize as tolerated  Out of bed to chair with meals  · DVT ppx: SCDs, on heparin gtt- plan to initiate coumadin this morning   · Plan of care discussed with MIREILLE Coreas  Neurosurgery will follow as primary, call with any further questions or concerns

## 2022-04-09 NOTE — ASSESSMENT & PLAN NOTE
Continue with home dose BB/Flecainide  · Continue heparin drip, 2nd MARCIN drain removed late in day 4/8  Cleared to resume Coumadin 4/9  Goal INR between 2 and 3

## 2022-04-09 NOTE — PROGRESS NOTES
[address] [facility]  Progress Note - Kerens Shape 1950, 71 y o  male MRN: [id number]  Unit/Bed#: [facility] 968-07 Encounter: [id number]  Primary Care Provider: Kristine Lam MD   Date and time admitted to hospital: 3/31/2022 10:58 AM    * Surgical site infection  Assessment & Plan  Hx of cervical myelopathy, s/p anterior cervical discectomy and fixation fusion C5/6 and C6/7; Posterior cervical decompression and instrumented fusion C3-T1 with Dr Edward Haddad on 3/11/22  Discharged to [facility] for rehab on 3/18, then discharged home on 3/29  Noted to have increased redness and drainage at incision site, no improvement after 6 days of Keflex  Referred to ED from OP office visit for further management  · CT cervical spine: Large posterior soft tissue fluid collection, morphology suspicious for infection as clinically suspected  Study is nondiagnostic for evaluation of the central canal and epidural abscess  No specific findings of osteomyelitis  · Admitted under neurosurgery service  · Status post operative washout on 4/1   · ID following and managing antibiotics  · Wound cultures and OR cultures are growing MRSA   · Currently on vancomycin, will need 4 weeks IV antibiotics followed by similar course of p o  Antibiotics, PICC line in place  · Blood cultures with no growth to date   · Analgesics as needed   · PT/OT recommending outpatient rehabilitation once medically stable  Case management aware of need for VNA for home intravenous antibiotics  Factor V Leiden mutation [facility])  Assessment & Plan  On coumadin outpatient (home regimen 2 5 mg daily except Wed/Sat takes 5 mg daily)   Last dose 3/31  · Heparin gtt started on 4/2 per neurosurgery (per my d/w NSx they would like patient on ACS low protocol NOT VTE/High given recent surgery)   · Both MARCIN drains out as 4/8    · Cleared to start Coumadin 4/9  · Monitor PT/INR    Paroxysmal A-fib [facility])  Assessment & Plan  Continue with home dose BB/Flecainide  · Continue heparin drip, 2nd MARCIN drain removed late in day 4/8  Cleared to resume Coumadin 4/9  Goal INR between 2 and 3  Headache  Assessment & Plan  · Will give intravenous Reglan/magnesium  Already given Tylenol by nurse  · Monitor    Cervical myelopathy [facility])  Assessment & Plan  Status post cervical spinal fusion on 3/11/22 as above   · See related plan     Anemia  Assessment & Plan  Appears to be postop anemia since recent surgery in March, hemoglobin between 9-10 since that time and previously within normal limits   · Hemoglobin was down-trending since operative washout, no signs or symptoms of active bleeding   · folate and B12 WNL  · Low Fe level however elevated ferritin  · Hemoglobin has been fluctuating but overall stable  Continue monitor and transfuse as needed  Mixed hyperlipidemia  Assessment & Plan  · Continue statin     Essential hypertension  Assessment & Plan  BP acceptable, monitor routinely   · Continue home dose Norvasc, metoprolol     WILL (obstructive sleep apnea)  Assessment & Plan  · CPAP qhs       VTE Pharmacologic Prophylaxis: VTE Score: 10 Moderate Risk (Score 3-4) - Pharmacological DVT Prophylaxis Ordered: heparin drip  Patient Centered Rounds: I performed bedside rounds with nursing staff today  Discussions with Specialists or Other Care Team Provider: nursing, neurosurgery PA [facility] and Discussions with Family / Patient: Patient  Time Spent for Care: 30 minutes  More than 50% of total time spent on counseling and coordination of care as described above  Current Length of Stay: 9 day(s)    Current Patient Status: Inpatient     Certification Statement: The patient will continue to require additional inpatient hospital stay due to Pending therapeutic INR, rest of plan per primary team     Discharge Plan: Anticipate discharge in >72 hrs to home with home services      Code Status: Level 1 - Full Code    Subjective:   Patient complains of headache, already improving with Tylenol given by nurse  Can offer intravenous Reglan/magnesium, patient states he will refuse if continues to improve  Was also having some toe pain which patient believes was possibly aggravated by walking, started in left great toe which has almost resolved  Now having some pain in right  Will monitor for now  Objective:     Vitals:   Temp (24hrs), Av °F (36 7 °C), Min:97 3 °F (36 3 °C), Max:98 5 °F (36 9 °C)    Temp:  [97 3 °F (36 3 °C)-98 5 °F (36 9 °C)] 98 1 °F (36 7 °C)  HR:  [63] 63  Resp:  [16] 16  BP: (140-150)/(65-72) 144/65  SpO2:  [92 %-94 %] 92 %  Body mass index is 32 78 kg/m²  Input and Output Summary (last 24 hours): Intake/Output Summary (Last 24 hours) at 2022 0953  Last data filed at 2022 0746  Gross per 24 hour   Intake 420 ml   Output 750 ml   Net -330 ml       Physical Exam:   Physical Exam  Vitals and nursing note reviewed  Constitutional:       Appearance: He is obese  Neck:      Comments: Cervical collar in place  Cardiovascular:      Rate and Rhythm: Normal rate  Heart sounds: Murmur heard  Pulmonary:      Breath sounds: Normal breath sounds  Abdominal:      Tenderness: There is no abdominal tenderness  Musculoskeletal:         General: Swelling (Trace lower extremity edema, nonpitting) present  Skin:     General: Skin is warm  Neurological:      Mental Status: He is alert and oriented to person, place, and time  Mental status is at baseline     Psychiatric:         Mood and Affect: Mood normal           Additional Data:     Labs:  Results from last 7 days   Lab Units 22  0632   WBC Thousand/uL 7 61   HEMOGLOBIN g/dL 7 4*   HEMATOCRIT % 22 9*   PLATELETS Thousands/uL 269   NEUTROS PCT % 68   LYMPHS PCT % 19   MONOS PCT % 8   EOS PCT % 3     Results from last 7 days   Lab Units 22  0515   SODIUM mmol/L 139   POTASSIUM mmol/L 3 6   CHLORIDE mmol/L 106   CO2 mmol/L 30   BUN mg/dL 9   CREATININE mg/dL 0  74   ANION GAP mmol/L 3*   CALCIUM mg/dL 8 6   GLUCOSE RANDOM mg/dL 93     Results from last 7 days   Lab Units 04/09/22  0632   INR  1 14                   Lines/Drains:  Invasive Devices  Report    Peripherally Inserted Central Catheter Line            PICC Line 19/01/01 Right Basilic 6 days                Central Line:  Goal for removal: N/A - Discharging with PICC for IV ABX/medications             Imaging: No pertinent imaging reviewed      Recent Cultures (last 7 days):         Last 24 Hours Medication List:   Current Facility-Administered Medications   Medication Dose Route Frequency Provider Last Rate    acetaminophen  975 mg Oral TID PRN Alison Biundo, PA-C      amLODIPine  5 mg Oral Daily Alison Biundo, PA-C      DULoxetine  60 mg Oral Daily Alison Biundo, PA-C      flecainide  100 mg Oral BID Alison Biundo, PA-C      fluticasone  1 spray Each Nare Daily Alexandria HERNANDEZ Maddy, CRNP      gabapentin  100 mg Oral Daily Alison Biundo, PA-C      gabapentin  300 mg Oral HS Alison Biundo, PA-C      heparin (porcine)  3-20 Units/kg/hr (Order-Specific) Intravenous Titrated Edouard Flores, PA-C 17 1 Units/kg/hr (04/09/22 0753)    magnesium sulfate  2 g Intravenous Once Alexandria Castañeda, CRTERRI      methocarbamol  750 mg Oral Q6H PRN Cherie Quiñones, CRTERRI      metoclopramide  10 mg Intravenous Once Vineet Orellana, CRNP      metoprolol succinate  100 mg Oral Daily Alison Biundo, PA-C      ondansetron  4 mg Intravenous Q6H PRN Alison Biundo, PA-C      oxyCODONE  2 5 mg Oral Q4H PRN Alison Biundo, PA-C      oxyCODONE  5 mg Oral Q4H PRN Alison Biundo, PA-C      polyethylene glycol  17 g Oral Daily PRN Khadijah Foster, DEEPTHI      potassium chloride  40 mEq Oral Daily Alison Biundo, PA-C      pravastatin  40 mg Oral Daily Alison Biundo, PA-C      senna  8 6 mg Oral Daily Alison Biundo, PA-C      tamsulosin  0 4 mg Oral Daily With [facility] MESI FABIOLA Faria      vancomycin  1,250 mg Intravenous Q12H FABIOLA Armas Stopped (04/09/22 0050)    warfarin  5 mg Oral Daily (warfarin) DEEPTHI Jackson          Today, Patient Was Seen By: DEEPHTI Lugo    **Please Note: This note may have been constructed using a voice recognition system  **

## 2022-04-10 PROBLEM — R51.9 HEADACHE: Status: RESOLVED | Noted: 2022-04-09 | Resolved: 2022-04-10

## 2022-04-10 PROBLEM — M79.674 GREAT TOE PAIN, RIGHT: Status: ACTIVE | Noted: 2022-04-10

## 2022-04-10 LAB
ANION GAP SERPL CALCULATED.3IONS-SCNC: 5 MMOL/L (ref 4–13)
APTT PPP: 105 SECONDS (ref 23–37)
APTT PPP: 55 SECONDS (ref 23–37)
APTT PPP: 69 SECONDS (ref 23–37)
BASOPHILS # BLD AUTO: 0.06 THOUSANDS/ΜL (ref 0–0.1)
BASOPHILS NFR BLD AUTO: 1 % (ref 0–1)
BUN SERPL-MCNC: 12 MG/DL (ref 5–25)
CALCIUM SERPL-MCNC: 8.5 MG/DL (ref 8.3–10.1)
CHLORIDE SERPL-SCNC: 105 MMOL/L (ref 100–108)
CO2 SERPL-SCNC: 29 MMOL/L (ref 21–32)
CREAT SERPL-MCNC: 0.88 MG/DL (ref 0.6–1.3)
EOSINOPHIL # BLD AUTO: 0.18 THOUSAND/ΜL (ref 0–0.61)
EOSINOPHIL NFR BLD AUTO: 2 % (ref 0–6)
ERYTHROCYTE [DISTWIDTH] IN BLOOD BY AUTOMATED COUNT: 13.6 % (ref 11.6–15.1)
GFR SERPL CREATININE-BSD FRML MDRD: 86 ML/MIN/1.73SQ M
GLUCOSE SERPL-MCNC: 107 MG/DL (ref 65–140)
HCT VFR BLD AUTO: 23.8 % (ref 36.5–49.3)
HGB BLD-MCNC: 7.6 G/DL (ref 12–17)
IMM GRANULOCYTES # BLD AUTO: 0.09 THOUSAND/UL (ref 0–0.2)
IMM GRANULOCYTES NFR BLD AUTO: 1 % (ref 0–2)
INR PPP: 1.17 (ref 0.84–1.19)
LYMPHOCYTES # BLD AUTO: 1.53 THOUSANDS/ΜL (ref 0.6–4.47)
LYMPHOCYTES NFR BLD AUTO: 21 % (ref 14–44)
MCH RBC QN AUTO: 29.8 PG (ref 26.8–34.3)
MCHC RBC AUTO-ENTMCNC: 31.9 G/DL (ref 31.4–37.4)
MCV RBC AUTO: 93 FL (ref 82–98)
MONOCYTES # BLD AUTO: 0.62 THOUSAND/ΜL (ref 0.17–1.22)
MONOCYTES NFR BLD AUTO: 8 % (ref 4–12)
NEUTROPHILS # BLD AUTO: 4.93 THOUSANDS/ΜL (ref 1.85–7.62)
NEUTS SEG NFR BLD AUTO: 67 % (ref 43–75)
NRBC BLD AUTO-RTO: 0 /100 WBCS
PLATELET # BLD AUTO: 263 THOUSANDS/UL (ref 149–390)
PMV BLD AUTO: 9.4 FL (ref 8.9–12.7)
POTASSIUM SERPL-SCNC: 3.8 MMOL/L (ref 3.5–5.3)
PROTHROMBIN TIME: 14.4 SECONDS (ref 11.6–14.5)
RBC # BLD AUTO: 2.55 MILLION/UL (ref 3.88–5.62)
SODIUM SERPL-SCNC: 139 MMOL/L (ref 136–145)
WBC # BLD AUTO: 7.41 THOUSAND/UL (ref 4.31–10.16)

## 2022-04-10 PROCEDURE — 99231 SBSQ HOSP IP/OBS SF/LOW 25: CPT | Performed by: NURSE PRACTITIONER

## 2022-04-10 PROCEDURE — NC001 PR NO CHARGE: Performed by: ORTHOPAEDIC SURGERY

## 2022-04-10 PROCEDURE — 85730 THROMBOPLASTIN TIME PARTIAL: CPT | Performed by: NEUROLOGICAL SURGERY

## 2022-04-10 PROCEDURE — 85730 THROMBOPLASTIN TIME PARTIAL: CPT | Performed by: NURSE PRACTITIONER

## 2022-04-10 PROCEDURE — 99222 1ST HOSP IP/OBS MODERATE 55: CPT | Performed by: ORTHOPAEDIC SURGERY

## 2022-04-10 PROCEDURE — 99024 POSTOP FOLLOW-UP VISIT: CPT | Performed by: PHYSICIAN ASSISTANT

## 2022-04-10 PROCEDURE — 85025 COMPLETE CBC W/AUTO DIFF WBC: CPT | Performed by: NURSE PRACTITIONER

## 2022-04-10 PROCEDURE — 99232 SBSQ HOSP IP/OBS MODERATE 35: CPT | Performed by: INTERNAL MEDICINE

## 2022-04-10 PROCEDURE — 85610 PROTHROMBIN TIME: CPT | Performed by: NURSE PRACTITIONER

## 2022-04-10 PROCEDURE — 80048 BASIC METABOLIC PNL TOTAL CA: CPT | Performed by: NURSE PRACTITIONER

## 2022-04-10 RX ADMIN — FLUTICASONE PROPIONATE 1 SPRAY: 50 SPRAY, METERED NASAL at 08:33

## 2022-04-10 RX ADMIN — TAMSULOSIN HYDROCHLORIDE 0.4 MG: 0.4 CAPSULE ORAL at 17:58

## 2022-04-10 RX ADMIN — HEPARIN SODIUM 17.1 UNITS/KG/HR: 10000 INJECTION, SOLUTION INTRAVENOUS; SUBCUTANEOUS at 05:59

## 2022-04-10 RX ADMIN — VANCOMYCIN HYDROCHLORIDE 1250 MG: 10 INJECTION, POWDER, LYOPHILIZED, FOR SOLUTION INTRAVENOUS at 11:37

## 2022-04-10 RX ADMIN — ACETAMINOPHEN 975 MG: 325 TABLET ORAL at 21:57

## 2022-04-10 RX ADMIN — GABAPENTIN 300 MG: 300 CAPSULE ORAL at 21:57

## 2022-04-10 RX ADMIN — PRAVASTATIN SODIUM 40 MG: 40 TABLET ORAL at 08:33

## 2022-04-10 RX ADMIN — POTASSIUM CHLORIDE 40 MEQ: 20 TABLET, EXTENDED RELEASE ORAL at 08:33

## 2022-04-10 RX ADMIN — WARFARIN SODIUM 5 MG: 5 TABLET ORAL at 17:58

## 2022-04-10 RX ADMIN — METOPROLOL SUCCINATE 100 MG: 100 TABLET, EXTENDED RELEASE ORAL at 08:33

## 2022-04-10 RX ADMIN — FLECAINIDE ACETATE 100 MG: 100 TABLET ORAL at 17:58

## 2022-04-10 RX ADMIN — FLECAINIDE ACETATE 100 MG: 100 TABLET ORAL at 08:33

## 2022-04-10 RX ADMIN — SENNOSIDES 8.6 MG: 8.6 TABLET, FILM COATED ORAL at 08:33

## 2022-04-10 RX ADMIN — METHOCARBAMOL TABLETS 750 MG: 750 TABLET, COATED ORAL at 18:01

## 2022-04-10 RX ADMIN — METHOCARBAMOL TABLETS 750 MG: 750 TABLET, COATED ORAL at 08:33

## 2022-04-10 RX ADMIN — DULOXETINE HYDROCHLORIDE 60 MG: 60 CAPSULE, DELAYED RELEASE ORAL at 08:33

## 2022-04-10 RX ADMIN — MELOXICAM 15 MG: 7.5 TABLET ORAL at 08:33

## 2022-04-10 RX ADMIN — GABAPENTIN 100 MG: 100 CAPSULE ORAL at 08:34

## 2022-04-10 RX ADMIN — AMLODIPINE BESYLATE 5 MG: 5 TABLET ORAL at 08:33

## 2022-04-10 RX ADMIN — HEPARIN SODIUM 17.11 UNITS/KG/HR: 10000 INJECTION, SOLUTION INTRAVENOUS; SUBCUTANEOUS at 21:55

## 2022-04-10 NOTE — ASSESSMENT & PLAN NOTE
POD 9 posterior cervical evacuation of postoperative collection and debridement with placement of drains C3-T1  · S/p Anterior cervical discectomy and fixation fusion C5/6 and C6/7; Posterior cervical decompression and instrumented fusion C3-T1 with Dr Soumya Hensley on 3/11/22  · Presented as a direct admission from the office on 03/31 with concerns for wound infection    Imaging:   · CT Cervical w/ contrast 3/31/2022: Large posterior soft tissue fluid collection, morphology suspicious for infection as clinically suspected  Study is nondiagnostic for evaluation of the central canal and epidural abscess  MR is better suited intracanalicular evaluation although will be limited by artifact from metal hardware  Plan:   Continue to monitor neurological exam and symptoms   Blood Cx no growth after 5 days    Wound cx 3/31 + MRSA   Intraop cx tissue culture 4/1 + MRSA   [place] collar to remain in place at all times, okay for [place] collar for showering   MARCIN drains:  o Left MARCIN removed 4/8/2022  o Right MARCIN removed 4/6/2022  · SLIM and ID consulted - appreciate recommendations  · Patient will require 4 weeks of IV vancomycin followed by 4 weeks of p o  antibiotics  · PICC in place  · Maintain vancomycin 1,5000 mg q12h per ID with pharmacy dosing  · Continue to monitor incision  · Pain well controlled on current pain regimen:  · Gabapentin 100mg daily and 300mg at HS  · Tylenol 975 mg TID prn  · Robaxin 750 mg q 6 hours p r n   · Oxycodone 2 5-5mg q 4 hours p r n  For moderate or severe pain  · Bowel regimen: Senokot, and MiraLax  · Mobilize with PT/OT  Home with outpatient rehab  · Mobilize as tolerated  Out of bed to chair with meals  · DVT ppx: SCDs, on heparin gtt  · Patient started on coumadin   · Plan of care discussed with MIREILLE [place]  Neurosurgery will follow as primary, call with any further questions or concerns

## 2022-04-10 NOTE — PLAN OF CARE
Problem: PAIN - ADULT  Goal: Verbalizes/displays adequate comfort level or baseline comfort level  Description: Interventions:  - Encourage patient to monitor pain and request assistance  - Assess pain using appropriate pain scale  - Administer analgesics based on type and severity of pain and evaluate response  - Implement non-pharmacological measures as appropriate and evaluate response  - Consider cultural and social influences on pain and pain management  - Notify physician/advanced practitioner if interventions unsuccessful or patient reports new pain  Outcome: Progressing     Problem: INFECTION - ADULT  Goal: Absence or prevention of progression during hospitalization  Description: INTERVENTIONS:  - Assess and monitor for signs and symptoms of infection  - Monitor lab/diagnostic results  - Monitor all insertion sites, i e  indwelling lines, tubes, and drains  - Monitor endotracheal if appropriate and nasal secretions for changes in amount and color  - [place] appropriate cooling/warming therapies per order  - Administer medications as ordered  - Instruct and encourage patient and family to use good hand hygiene technique  - Identify and instruct in appropriate isolation precautions for identified infection/condition  Outcome: Progressing  Goal: Absence of fever/infection during neutropenic period  Description: INTERVENTIONS:  - Monitor WBC    Outcome: Progressing     Problem: SAFETY ADULT  Goal: Patient will remain free of falls  Description: INTERVENTIONS:  - Educate patient/family on patient safety including physical limitations  - Instruct patient to call for assistance with activity   - Consult OT/PT to assist with strengthening/mobility   - Keep Call bell within reach  - Keep bed low and locked with side rails adjusted as appropriate  - Keep care items and personal belongings within reach  - Initiate and maintain comfort rounds  - Make Fall Risk Sign visible to staff  - Offer Toileting   - Obtain necessary fall risk management equipment  - Apply yellow socks and bracelet for high fall risk patients  - Consider moving patient to room near nurses station  Outcome: Progressing  Goal: Maintain or return to baseline ADL function  Description: INTERVENTIONS:  -  Assess patient's ability to carry out ADLs; assess patient's baseline for ADL function and identify physical deficits which impact ability to perform ADLs (bathing, care of mouth/teeth, toileting, grooming, dressing, etc )  - Assess/evaluate cause of self-care deficits   - Assess range of motion  - Assess patient's mobility; develop plan if impaired  - Assess patient's need for assistive devices and provide as appropriate  - Encourage maximum independence but intervene and supervise when necessary  - Involve family in performance of ADLs  - Assess for home care needs following discharge   - Consider OT consult to assist with ADL evaluation and planning for discharge  - Provide patient education as appropriate  Outcome: Progressing  Goal: Maintains/Returns to pre admission functional level  Description: INTERVENTIONS:  - Perform BMAT or MOVE assessment daily    - Set and communicate daily mobility goal to care team and patient/family/caregiver     - Collaborate with rehabilitation services on mobility goals if consulted  - Out of bed for toileting  - Record patient progress and toleration of activity level   Outcome: Progressing     Problem: DISCHARGE PLANNING  Goal: Discharge to home or other facility with appropriate resources  Description: INTERVENTIONS:  - Identify barriers to discharge w/patient and caregiver  - Arrange for needed discharge resources and transportation as appropriate  - Identify discharge learning needs (meds, wound care, etc )  - Arrange for interpretive services to assist at discharge as needed  - Refer to Case Management Department for coordinating discharge planning if the patient needs post-hospital services based on physician/advanced practitioner order or complex needs related to functional status, cognitive ability, or social support system  Outcome: Progressing     Problem: Knowledge Deficit  Goal: Patient/family/caregiver demonstrates understanding of disease process, treatment plan, medications, and discharge instructions  Description: Complete learning assessment and assess knowledge base    Interventions:  - Provide teaching at level of understanding  - Provide teaching via preferred learning methods  Outcome: Progressing     Problem: Potential for Falls  Goal: Patient will remain free of falls  Description: INTERVENTIONS:  - Educate patient/family on patient safety including physical limitations  - Instruct patient to call for assistance with activity   - Consult OT/PT to assist with strengthening/mobility   - Keep Call bell within reach  - Keep bed low and locked with side rails adjusted as appropriate  - Keep care items and personal belongings within reach  - Initiate and maintain comfort rounds  - Make Fall Risk Sign visible to staff  - Offer [address] necessary fall risk management equipment  - Apply yellow socks and bracelet for high fall risk patients  - Consider moving patient to room near nurses station  Outcome: Progressing     Problem: MOBILITY - ADULT  Goal: Maintain or return to baseline ADL function  Description: INTERVENTIONS:  -  Assess patient's ability to carry out ADLs; assess patient's baseline for ADL function and identify physical deficits which impact ability to perform ADLs (bathing, care of mouth/teeth, toileting, grooming, dressing, etc )  - Assess/evaluate cause of self-care deficits   - Assess range of motion  - Assess patient's mobility; develop plan if impaired  - Assess patient's need for assistive devices and provide as appropriate  - Encourage maximum independence but intervene and supervise when necessary  - Involve family in performance of ADLs  - Assess for home care needs following discharge   - Consider OT consult to assist with ADL evaluation and planning for discharge  - Provide patient education as appropriate  Outcome: Progressing  Goal: Maintains/Returns to pre admission functional level  Description: INTERVENTIONS:  - Perform BMAT or MOVE assessment daily    - Set and communicate daily mobility goal to care team and patient/family/caregiver     - Collaborate with rehabilitation services on mobility goals if consulted  - Out of bed for toileting  - Record patient progress and toleration of activity level   Outcome: Progressing     Problem: Prexisting or High Potential for Compromised Skin Integrity  Goal: Skin integrity is maintained or improved  Description: INTERVENTIONS:  - Identify patients at risk for skin breakdown  - Assess and monitor skin integrity  - Assess and monitor nutrition and hydration status  - Monitor labs   - Assess for incontinence   - Turn and reposition patient  - Assist with mobility/ambulation  - Relieve pressure over bony prominences  - Avoid friction and shearing  - Provide appropriate hygiene as needed including keeping skin clean and dry  - Evaluate need for skin moisturizer/barrier cream  - Collaborate with interdisciplinary team   - Patient/family teaching  - Consider wound care consult   Outcome: Progressing     Problem: MUSCULOSKELETAL - ADULT  Goal: Maintain or return mobility to safest level of function  Description: INTERVENTIONS:  - Assess patient's ability to carry out ADLs; assess patient's baseline for ADL function and identify physical deficits which impact ability to perform ADLs (bathing, care of mouth/teeth, toileting, grooming, dressing, etc )  - Assess/evaluate cause of self-care deficits   - Assess range of motion  - Assess patient's mobility  - Assess patient's need for assistive devices and provide as appropriate  - Encourage maximum independence but intervene and supervise when necessary  - Involve family in performance of ADLs  - Assess for home care needs following discharge   - Consider OT consult to assist with ADL evaluation and planning for discharge  - Provide patient education as appropriate  Outcome: Progressing  Goal: Maintain proper alignment of affected body part  Description: INTERVENTIONS:  - Support, maintain and protect limb and body alignment  - Provide patient/ family with appropriate education  Outcome: Progressing     Problem: Nutrition/Hydration-ADULT  Goal: Nutrient/Hydration intake appropriate for improving, restoring or maintaining nutritional needs  Description: Monitor and assess patient's nutrition/hydration status for malnutrition  Collaborate with interdisciplinary team and initiate plan and interventions as ordered  Monitor patient's weight and dietary intake as ordered or per policy  Utilize nutrition screening tool and intervene as necessary  Determine patient's food preferences and provide high-protein, high-caloric foods as appropriate       INTERVENTIONS:  - Monitor oral intake, urinary output, labs, and treatment plans  - Assess nutrition and hydration status and recommend course of action  - Evaluate amount of meals eaten  - Assist patient with eating if necessary   - Allow adequate time for meals  - Recommend/ encourage appropriate diets, oral nutritional supplements, and vitamin/mineral supplements  - Order, calculate, and assess calorie counts as needed  - Recommend, monitor, and adjust tube feedings and TPN/PPN based on assessed needs  - Assess need for intravenous fluids  - Provide specific nutrition/hydration education as appropriate  - Include patient/family/caregiver in decisions related to nutrition  Outcome: Progressing

## 2022-04-10 NOTE — CONSULTS
Orthopedics   Alvaro Rai 71 y o  male MRN: [id number]  Unit/Bed#: [facility] 714-83      Chief Complaint:   bilateral foot pain    HPI:   71 y o male complaining of bilateral foot erythema and pain  Patient is currently admitted for management of a cervical spine wound dehiscence  Over the past couple of days, patient has also noted some pain in the feet with ambulation  The pain is localized to the 1st MTP joint bilaterally, worse with palpation, movement, or ambulation, better with rest  The pain started primarily on the left foot, but currently is affecting the right foot  Patient denies any prior history of inflammatory arthritis, specifically no history of gout  Review Of Systems:   · Skin: Erythema  · Neuro: See HPI  · Musculoskeletal: See HPI  · 14 point review of systems negative except as stated above     Past Medical History:   Past Medical History:   Diagnosis Date    Acute venous embolism and thrombosis of deep vessels of proximal lower extremity (HCC)     Last assessed: 5/18/15    Arthritis     Cellulitis     LE    CPAP (continuous positive airway pressure) dependence     Factor V Leiden ([facility] [facility] )     Forgetfulness     [place] (hard of hearing)     Paroxysmal atrial fibrillation ([facility] [address] )     Last assessed: 11/2/15    Sleep apnea        Past Surgical History:   Past Surgical History:   Procedure Laterality Date    BACK SURGERY      Lower    COLON SURGERY      COLONOSCOPY      INCISION AND DRAINAGE POSTERIOR SPINE N/A 4/1/2022    Procedure: Posterior cervical evacuation of postoperative collection and debridement with placement of drains C3-T1; Surgeon: Jeremy Clifford MD;  [facility];  Service: Neurosurgery    IN ARTHRODESIS ANT INTERBODY MIN DISCECTOMY, CERVICAL BELOW C2 N/A 3/11/2022    Procedure: Anterior cervical discectomy and fixation fusion C5/6 and C6/7; Posterior cervical decompression and instrumented fusion C3-T1;   Surgeon: Jeremy Clifford MD;  [facility];  Service: Neurosurgery       Family History:  Family history reviewed and non-contributory  Family History   Problem Relation Age of Onset    Lung cancer Mother     No Known Problems Father     Heart attack Brother     Atrial fibrillation Brother     Emphysema Sister        Social History:  Social History     Socioeconomic History    Marital status: /Civil Union     Spouse name: Linnea Escobedo Number of children: 4    Years of education: None    Highest education level: None   Occupational History    Occupation: Retired   Tobacco Use    Smoking status: Never Smoker    Smokeless tobacco: Never Used   Vaping Use    Vaping Use: Never used   Substance and Sexual Activity    Alcohol use: Not Currently    Drug use: No    Sexual activity: None   Other Topics Concern    None   Social History Narrative    Caffeine use: 2 cups coffee a day     Social Determinants of Health     Financial Resource Strain: Not on file   Food Insecurity: No Food Insecurity    Worried About Running Out of Food in the Last Year: Never true    Kacy of Food in the Last Year: Never true   Transportation Needs: No Transportation Needs    Lack of Transportation (Medical): No    Lack of Transportation (Non-Medical): No   Physical Activity: Not on file   Stress: Not on file   Social Connections: Not on file   Intimate Partner Violence: Not on file   Housing Stability: Low Risk     Unable to Pay for Housing in the Last Year: No    Number of Places Lived in the Last Year: 1    Unstable Housing in the Last Year: No       Allergies:    Allergies   Allergen Reactions    Morphine GI Intolerance    Vitamin K Other (See Comments)     On coumadin-patient sensitive to vitamin K amounts in meds etc            Labs:  0   Lab Value Date/Time    HCT 22 9 (L) 04/09/2022 0632    HCT 25 9 (L) 04/08/2022 1724    HCT 26 4 (L) 04/08/2022 1724    HCT 39 5 01/23/2015 0440    HCT 40 7 01/22/2015 0610    HCT 43 4 01/21/2015 2022    HGB 7 4 (L) 04/09/2022 0632    HGB 8 4 (L) 04/08/2022 1724    HGB 8 5 (L) 04/08/2022 1724    HGB 12 9 01/23/2015 0440    HGB 13 4 01/22/2015 0610    HGB 15 0 01/21/2015 2042    HGB 14 4 01/21/2015 2022    INR 1 14 04/09/2022 0632    INR 2 2 (H) 01/11/2018 1405    WBC 7 61 04/09/2022 0632    WBC 7 24 04/08/2022 1724    WBC 7 01 04/08/2022 0537    WBC 8 43 01/23/2015 0440    WBC 12 60 (H) 01/22/2015 0610    WBC 14 04 (H) 01/21/2015 2022    ESR 55 (H) 04/05/2022 1359    CRP 47 4 (H) 04/05/2022 1359       Meds:    Current Facility-Administered Medications:     acetaminophen (TYLENOL) tablet 975 mg, 975 mg, Oral, TID PRN, Alison Biundo, PA-C, 975 mg at 04/09/22 2104    amLODIPine (NORVASC) tablet 5 mg, 5 mg, Oral, Daily, Alison Biundo, PA-C, 5 mg at 04/09/22 0920    DULoxetine (CYMBALTA) delayed release capsule 60 mg, 60 mg, Oral, Daily, Alison Biundo, PA-C, 60 mg at 04/09/22 0920    flecainide (TAMBOCOR) tablet 100 mg, 100 mg, Oral, BID, Alison Biundo, PA-C, 100 mg at 04/09/22 1705    fluticasone (FLONASE) 50 mcg/act nasal spray 1 spray, 1 spray, Each Nare, Daily, DEEPTHI Wisdom, 1 spray at 04/09/22 5009    gabapentin (NEURONTIN) capsule 100 mg, 100 mg, Oral, Daily, Alison Biundo, PA-C, 100 mg at 04/09/22 1833    gabapentin (NEURONTIN) capsule 300 mg, 300 mg, Oral, HS, Alison Biundo, PA-C, 300 mg at 04/09/22 2104    heparin (porcine) 25,000 Units in sodium chloride 0 45 % 250 mL infusion, 3-20 Units/kg/hr (Order-Specific), Intravenous, Titrated, Simone Dean PA-C, Last Rate: 15 4 mL/hr at 04/09/22 1515, 17 1 Units/kg/hr at 04/09/22 1515    magnesium sulfate 2 g/50 mL IVPB (premix) 2 g, 2 g, Intravenous, Once, DEEPTHI Wisdom    meloxicam (MOBIC) tablet 15 mg, 15 mg, Oral, Daily, Kelly Patricio MD, 15 mg at 04/09/22 1953    methocarbamol (ROBAXIN) tablet 750 mg, 750 mg, Oral, Q6H PRN, DEEPTHI Sanders, 750 mg at 04/09/22 1715    metoclopramide (REGLAN) injection 10 mg, 10 mg, Intravenous, Once, DEEPTHI Wisdom    metoprolol succinate (TOPROL-XL) 24 hr tablet 100 mg, 100 mg, Oral, Daily, VEENA Marx-C, 100 mg at 04/09/22 0920    ondansetron (ZOFRAN) injection 4 mg, 4 mg, Intravenous, Q6H PRN, Alison Faria PA-C    oxyCODONE (ROXICODONE) IR tablet 2 5 mg, 2 5 mg, Oral, Q4H PRN, VEENA Marx-C, 2 5 mg at 04/06/22 1740    oxyCODONE (ROXICODONE) IR tablet 5 mg, 5 mg, Oral, Q4H PRN, VEENA Marx-C, 5 mg at 04/09/22 1715    polyethylene glycol (MIRALAX) packet 17 g, 17 g, Oral, Daily PRN, DEEPTHI Burns    potassium chloride (K-DUR,KLOR-CON) CR tablet 40 mEq, 40 mEq, Oral, Daily, Alisonroxanne Faria PA-C, 40 mEq at 04/09/22 0920    pravastatin (PRAVACHOL) tablet 40 mg, 40 mg, Oral, Daily, Alisonroxanne Faria PA-C, 40 mg at 04/09/22 7967    senna (SENOKOT) tablet 8 6 mg, 8 6 mg, Oral, Daily, Alison Faria PA-C, 8 6 mg at 04/09/22 0233    tamsulosin (FLOMAX) capsule 0 4 mg, 0 4 mg, Oral, Daily With Dinner, VEENA Marx-C, 0 4 mg at 04/09/22 1548    vancomycin (VANCOCIN) 1,250 mg in sodium chloride 0 9 % 250 mL IVPB, 1,250 mg, Intravenous, Q12H, Go Hancock PA-C, Stopped at 04/09/22 1325    warfarin (COUMADIN) tablet 5 mg, 5 mg, Oral, Daily (warfarin), DEEPTHI Wisdom, 5 mg at 04/09/22 1705    Blood Culture:   Lab Results   Component Value Date    BLOODCX No Growth After 5 Days  03/31/2022    BLOODCX No Growth After 5 Days  03/31/2022       Wound Culture:   Lab Results   Component Value Date    WOUNDCULT (A) 03/31/2022     2+ Growth of Methicillin Resistant Staphylococcus aureus       Ins and Outs:  I/O last 24 hours:   In: 1294 8 [P O :480; I V :314 8; IV Piggyback:500]  Out: 850 [Urine:850]          Physical Exam:   /65   Pulse 57   Temp 97 8 °F (36 6 °C)   Resp 16   Ht 5' 11" (1 803 m)   Wt 107 kg (235 lb)   SpO2 93%   BMI 32 78 kg/m²   Gen: Alert and oriented to person, place, time  HEENT: EOMI, eyes clear, moist mucus membranes, hearing intact  Respiratory: Bilateral chest rise  No audible wheezing found  Cardiovascular: Regular Rate and Rhythm  Abdomen: soft nontender/nondistended  Musculoskeletal: left Lower Extremity  · Skin: no erythema, mild swelling  · No significant pain with foot/ankle motion  · Sensation grossly intact guillermo/sap/sp/dp/tib  · Motor grossly intact ankle df/pf, ehl/fhl  · 2+ DP pulse    Musculoskeletal: right Lower Extremity  · Skin: Erythema over 1st MTP joint, no induration or fluctuance  · Painful range of motion of 1st MTP joint, no micromotion tenderness  · Sensation grossly intact guillermo/sap/sp/dp/tib  · Motor grossly intact ankle df/pf, ehl/fhl  · 2+ DP pulse    Radiology:   Xrays of the right foot are ordered and will be reviewed upon completion    _*_*_*_*_*_*_*_*_*_*_*_*_*_*_*_*_*_*_*_*_*_*_*_*_*_*_*_*_*_*_*_*_*_*_*_*_*_*_*_*_*    Assessment:  72 y o male with right 1st MTP pain, presumed gout flare  Recommend medical management, no orthopedic intervention indicated  Plan:   · WBAT RLE  · Heat to affected area  · Anti-inflammatory medication/colchicine  · Analgesics for pain  · Body mass index is 32 78 kg/m²  mildly obese  Recommend behavior modifications, nutrition and physical activity    · Dispo: Ortho will follow      Thedaniel Olivo MD

## 2022-04-10 NOTE — ASSESSMENT & PLAN NOTE
· On heparin gtt, PTT this am was 55  · No active signs of bleeding, continue to monitor incision  · Started on coumadin 5mg daily   · INR 1 17  · Management per SLIM  Continue transition to oral AC at this time

## 2022-04-10 NOTE — PROGRESS NOTES
Progress Note - Orthopedics   Praveen Hill 71 y o  male MRN: [id number]  [facility]      Subjective:    71 y o male with R great toe pain, presumed gout  Doing well this AM, pain improving       Labs:  0   Lab Value Date/Time    HCT 23 8 (L) 04/10/2022 0501    HCT 22 9 (L) 04/09/2022 0632    HCT 25 9 (L) 04/08/2022 1724    HCT 26 4 (L) 04/08/2022 1724    HCT 39 5 01/23/2015 0440    HCT 40 7 01/22/2015 0610    HCT 43 4 01/21/2015 2022    HGB 7 6 (L) 04/10/2022 0501    HGB 7 4 (L) 04/09/2022 0632    HGB 8 4 (L) 04/08/2022 1724    HGB 8 5 (L) 04/08/2022 1724    HGB 12 9 01/23/2015 0440    HGB 13 4 01/22/2015 0610    HGB 15 0 01/21/2015 2042    HGB 14 4 01/21/2015 2022    INR 1 14 04/09/2022 0632    INR 2 2 (H) 01/11/2018 1405    WBC 7 41 04/10/2022 0501    WBC 7 61 04/09/2022 0632    WBC 7 24 04/08/2022 1724    WBC 8 43 01/23/2015 0440    WBC 12 60 (H) 01/22/2015 0610    WBC 14 04 (H) 01/21/2015 2022    ESR 55 (H) 04/05/2022 1359    CRP 47 4 (H) 04/05/2022 1359       Meds:    Current Facility-Administered Medications:     acetaminophen (TYLENOL) tablet 975 mg, 975 mg, Oral, TID PRN, Alisonroxanne Faria, PA-C, 975 mg at 04/09/22 2104    amLODIPine (NORVASC) tablet 5 mg, 5 mg, Oral, Daily, Alison Biluz elenao, PA-C, 5 mg at 04/09/22 0920    DULoxetine (CYMBALTA) delayed release capsule 60 mg, 60 mg, Oral, Daily, Alison Giana, PA-C, 60 mg at 04/09/22 0920    flecainide (TAMBOCOR) tablet 100 mg, 100 mg, Oral, BID, Alison Biundo, PA-C, 100 mg at 04/09/22 1705    fluticasone (FLONASE) 50 mcg/act nasal spray 1 spray, 1 spray, Each Nare, Daily, DEEPTHI Wisdom, 1 spray at 04/09/22 8475    gabapentin (NEURONTIN) capsule 100 mg, 100 mg, Oral, Daily, Alison Biundo, PA-C, 100 mg at 04/09/22 6665    gabapentin (NEURONTIN) capsule 300 mg, 300 mg, Oral, HS, Alison Biundo, PA-C, 300 mg at 04/09/22 2103    heparin (porcine) 25,000 Units in sodium chloride 0 45 % 250 mL infusion, 3-20 Units/kg/hr (Order-Specific), Intravenous, Titrated, Angélica Valladares PA-C, Last Rate: 15 4 mL/hr at 04/10/22 0559, 17 1 Units/kg/hr at 04/10/22 0559    magnesium sulfate 2 g/50 mL IVPB (premix) 2 g, 2 g, Intravenous, Once, DEEPTHI Wisdom    meloxicam (MOBIC) tablet 15 mg, 15 mg, Oral, Daily, Alexandria Powers MD, 15 mg at 04/09/22 1953    methocarbamol (ROBAXIN) tablet 750 mg, 750 mg, Oral, Q6H PRN, DEEPTHI Sanders, 750 mg at 04/09/22 1715    metoclopramide (REGLAN) injection 10 mg, 10 mg, Intravenous, Once, DEEPTHI Wisdom    metoprolol succinate (TOPROL-XL) 24 hr tablet 100 mg, 100 mg, Oral, Daily, TR MarxC, 100 mg at 04/09/22 0920    ondansetron (ZOFRAN) injection 4 mg, 4 mg, Intravenous, Q6H PRN, TR MarxC    oxyCODONE (ROXICODONE) IR tablet 2 5 mg, 2 5 mg, Oral, Q4H PRN, VEENA Marx-C, 2 5 mg at 04/06/22 1740    oxyCODONE (ROXICODONE) IR tablet 5 mg, 5 mg, Oral, Q4H PRN, VEENA Marx-C, 5 mg at 04/09/22 1715    polyethylene glycol (MIRALAX) packet 17 g, 17 g, Oral, Daily PRN, Song Moody-Casey, CRNP    potassium chloride (K-DUR,KLOR-CON) CR tablet 40 mEq, 40 mEq, Oral, Daily, Alison Faria PA-C, 40 mEq at 04/09/22 0920    pravastatin (PRAVACHOL) tablet 40 mg, 40 mg, Oral, Daily, Alisonroxanne Faria PA-C, 40 mg at 04/09/22 6337    senna (SENOKOT) tablet 8 6 mg, 8 6 mg, Oral, Daily, Alison Biundo, PA-C, 8 6 mg at 04/09/22 8361    tamsulosin (FLOMAX) capsule 0 4 mg, 0 4 mg, Oral, Daily With Dinner, Alison Faria PA-C, 0 4 mg at 04/09/22 1548    vancomycin (VANCOCIN) 1,250 mg in sodium chloride 0 9 % 250 mL IVPB, 1,250 mg, Intravenous, Q12H, Vanessa Kendall PA-C, Last Rate: 166 7 mL/hr at 04/09/22 2303, 1,250 mg at 04/09/22 2303    warfarin (COUMADIN) tablet 5 mg, 5 mg, Oral, Daily (warfarin), DEEPTHI Wisdom, 5 mg at 04/09/22 1705    Blood Culture:   Lab Results   Component Value Date    BLOODCX No Growth After 5 Days  03/31/2022    BLOODCX No Growth After 5 Days  03/31/2022       Wound Culture:   Lab Results   Component Value Date    WOUNDCULT (A) 03/31/2022     2+ Growth of Methicillin Resistant Staphylococcus aureus       Ins and Outs:  I/O last 24 hours: In: 1294 8 [P O :480; I V :314 8; IV Piggyback:500]  Out: 850 [Urine:850]          Physical:  Vitals:    04/09/22 2239   BP: 141/69   Pulse: 64   Resp: 16   Temp: 99 3 °F (37 4 °C)   SpO2: 90%     Musculoskeletal: right Lower Extremity  · Skin intact, minimal erythema on exam today  · TTP 1st MTP  · Sensation intact to light touch guillermo/saph/sp/dp/tib  · Motor intact EHL/FHL, ankle DF/PF  · 2+ DP pulse  · No micromotion tenderness with great toe MTP motion  Assessment:    72 y o male with R great toe pain, presumed gout  Xrays reviewed showing arthritis of the 1st MTP, consistent with hallux rigidus  No plan for any acute surgical intervention  Continue medical management of gout       Plan:  · WBAT RLE  · PT/OT  · Pain control  · DVT PPX: SCDs, encourage ambulation  · Dispo: Ortho will follow    Nino Guadarrama MD

## 2022-04-10 NOTE — PROGRESS NOTES
[facility]  Progress Note - Frandy Hickey 1950, 71 y o  male MRN: [id number]  Unit/Bed#: [facility] 050-48 Encounter: [id number]  Primary Care Provider: Corey Singh MD   Date and time admitted to hospital: 3/31/2022 10:58 AM    Cervical myelopathy ([address] )  Assessment & Plan  POD 9 posterior cervical evacuation of postoperative collection and debridement with placement of drains C3-T1  · S/p Anterior cervical discectomy and fixation fusion C5/6 and C6/7; Posterior cervical decompression and instrumented fusion C3-T1 with Dr Rhiannon Rae on 3/11/22  · Presented as a direct admission from the office on 03/31 with concerns for wound infection    Imaging:   · CT Cervical w/ contrast 3/31/2022: Large posterior soft tissue fluid collection, morphology suspicious for infection as clinically suspected  Study is nondiagnostic for evaluation of the central canal and epidural abscess  MR is better suited intracanalicular evaluation although will be limited by artifact from metal hardware  Plan:   Continue to monitor neurological exam and symptoms   Blood Cx no growth after 5 days    Wound cx 3/31 + MRSA   Intraop cx tissue culture 4/1 + MRSA   Lav[place] collar to remain in place at all times, okay for [place] collar for showering   MARCIN drains:  o Left MARCIN removed 4/8/2022  o Right MARCIN removed 4/6/2022  · SLIM and ID consulted - appreciate recommendations  · Patient will require 4 weeks of IV vancomycin followed by 4 weeks of p o  antibiotics  · PICC in place  · Maintain vancomycin 1,5000 mg q12h per ID with pharmacy dosing  · Continue to monitor incision  · Pain well controlled on current pain regimen:  · Gabapentin 100mg daily and 300mg at HS  · Tylenol 975 mg TID prn  · Robaxin 750 mg q 6 hours p r n   · Oxycodone 2 5-5mg q 4 hours p r n  For moderate or severe pain  · Bowel regimen: Senokot, and MiraLax  · Mobilize with PT/OT  Home with outpatient rehab  · Mobilize as tolerated  Out of bed to chair with meals  · DVT ppx: SCDs, on heparin gtt  · Patient started on coumadin   · Plan of care discussed with MIREILLE Hallman  Neurosurgery will follow as primary, call with any further questions or concerns  Great toe pain, right  Assessment & Plan  · Complaints or R great toe pain with warmth yesterday   · Quick note placed documenting plan for uric acid level, orthopedics consult and mobic 15mg x3 days  · Further medication management with NSAIDS and steroids should be avoided  · patient post op from cervical fusion and NSAIDs can inhibit bony fusion formation  Plan to DC after 3 days  Factor V Leiden mutation ([facility] [address] )  Assessment & Plan  · On heparin gtt, PTT this am was 55  · No active signs of bleeding, continue to monitor incision  · Started on coumadin 5mg daily   · INR 1 17  · Management per SLIM  Continue transition to oral AC at this time  Subjective/Objective   Chief Complaint:  Doing well    Subjective:  Patient reports overall he has no acute complaints or concerns this morning  He has very mild posterior scalp headache that is significantly better compared to yesterday  He also reports improvement in his right foot pain  He was evaluated Orthopedics with recommendations for medical management  Objective:  Lying in bed in no acute distress    I/O       04/08 0701  04/09 0700 04/09 0701  04/10 0700 04/10 0701  04/11 0700    P  O   480     I V  (mL/kg)  314 8 (2 9)     IV Piggyback 250 250     Total Intake(mL/kg) 250 (2 3) 1044 8 (9 8)     Urine (mL/kg/hr) 750 (0 3) 550 (0 2)     Drains       Total Output 750 550     Net -500 +494 8                  Invasive Devices  Report    Peripherally Inserted Central Catheter Line            PICC Line 40/67/20 Right Basilic 7 days                Physical Exam:  Vitals: Blood pressure 138/60, pulse 63, temperature (!) 97 3 °F (36 3 °C), resp  rate 16, height 5' 11" (1 803 m), weight 107 kg (235 lb), SpO2 95 %  ,Body mass index is 32 78 kg/m²  General appearance: alert, appears stated age, cooperative and no distress  Head: Normocephalic, without obvious abnormality  Eyes:  Tracking appropriately  Neck: supple, symmetrical, trachea midline  Very minimal tenderness to palpation  Blanchable erythema surrounding lower 2/3 of incision  No active drainage or bleeding noted  Skin edges appear to be well approximated  Back: no kyphosis present  Lungs: non labored breathing  Heart: regular heart rate  Neurologic:   Mental status: Alert, oriented x3, thought content appropriate  Cranial nerves: grossly intact (Cranial nerves II-XII)  Sensory: normal to light touch  Motor: moving all extremities without focal weakness 5/5 strength throughout    Lab Results:  Results from last 7 days   Lab Units 04/10/22  0501 04/09/22  0632 04/08/22  1724 04/08/22  0537 04/08/22  0537   WBC Thousand/uL 7 41 7 61 7 24   < > 7 01   HEMOGLOBIN g/dL 7 6* 7 4* 8 4*  8 5*   < > 7 7*   HEMATOCRIT % 23 8* 22 9* 25 9*  26 4*   < > 24 2*   PLATELETS Thousands/uL 263 269 330   < > 282   NEUTROS PCT % 67 68  --   --   --    MONOS PCT % 8 8  --   --   --    MONO PCT %  --   --   --   --  3*    < > = values in this interval not displayed  Results from last 7 days   Lab Units 04/10/22  0501 04/07/22  0515 04/05/22  1359   POTASSIUM mmol/L 3 8 3 6 3 8   CHLORIDE mmol/L 105 106 106   CO2 mmol/L 29 30 31   BUN mg/dL 12 9 10   CREATININE mg/dL 0 88 0 74 0 64   CALCIUM mg/dL 8 5 8 6 8 9             Results from last 7 days   Lab Units 04/10/22  0613 04/09/22  1954 04/09/22  1411 04/09/22  0632 04/09/22  0632 04/08/22  2326 04/08/22  1724   INR  1 17  --   --   --  1 14  --  1 24*   PTT seconds 55* 67* 67*   < > 52*   < > 32    < > = values in this interval not displayed  No results found for: TROPONINT  ABG:No results found for: PHART, MZX0GOW, PO2ART, XAP6ACB, J7PSTHQP, BEART, SOURCE    Imaging Studies: I have personally reviewed pertinent reports     and I have personally reviewed pertinent films in PACS  CT spine cervical w contrast    Result Date: 3/31/2022  Impression: Large posterior soft tissue fluid collection, morphology suspicious for infection as clinically suspected  Study is nondiagnostic for evaluation of the central canal and epidural abscess  MR is better suited intracanalicular evaluation although will be limited by artifact from metal hardware  No specific findings of osteomyelitis  Workstation performed: [id number]       EKG, Pathology, and Other Studies: I have personally reviewed pertinent reports        VTE Pharmacologic Prophylaxis: Heparin and Warfarin (Coumadin)    VTE Mechanical Prophylaxis: sequential compression device

## 2022-04-10 NOTE — ASSESSMENT & PLAN NOTE
· Complaints or R great toe pain with warmth yesterday   · Quick note placed documenting plan for uric acid level, orthopedics consult and mobic 15mg x3 days  · Further medication management with NSAIDS and steroids should be avoided  · patient post op from cervical fusion and NSAIDs can inhibit bony fusion formation  Plan to DC after 3 days

## 2022-04-10 NOTE — ASSESSMENT & PLAN NOTE
Presumed gout  Continue Mobic x 3 days  Uric acid level WNL  Seen by ortho, no intervention   · X-rays pending radiology read however reviewed by Ortho  · Symptomatically improved overnight     · Avoid steroids given infection  · Could consider colchicine if persists

## 2022-04-10 NOTE — PROGRESS NOTES
Vancomycin IV Pharmacy-to-Dose Consultation    Paulette Olmedo is a 71 y o  male who is currently receiving Vancomycin IV with management by the Pharmacy Consult service  Assessment/Plan:  The patient was reviewed  Renal function is stable and no signs or symptoms of nephrotoxicity and/or infusion reactions were documented in the chart  Based on todays assessment, continue current vancomycin (day # 10) dosing of 1250 mg q12h, with a plan for trough to be drawn at 1100 on 4/12  We will continue to follow the patients culture results and clinical progress daily      Benigno Alcala, Pharmacist

## 2022-04-10 NOTE — PROGRESS NOTES
[address] [facility]  Progress Note - Rajeev Duenas 1950, 71 y o  male MRN: [id number]  Unit/Bed#: [facility] 479-77 Encounter: [id number]  Primary Care Provider: Alexandra Sawyer MD   Date and time admitted to hospital: 3/31/2022 10:58 AM    * Surgical site infection  Assessment & Plan  Hx of cervical myelopathy, s/p anterior cervical discectomy and fixation fusion C5/6 and C6/7; Posterior cervical decompression and instrumented fusion C3-T1 with Dr Radha Polk on 3/11/22  Discharged to [facility] for rehab on 3/18, then discharged home on 3/29  Noted to have increased redness and drainage at incision site, no improvement after 6 days of Keflex  Referred to ED from OP office visit for further management  · CT cervical spine: Large posterior soft tissue fluid collection, morphology suspicious for infection as clinically suspected  Study is nondiagnostic for evaluation of the central canal and epidural abscess  No specific findings of osteomyelitis  · Admitted under neurosurgery service  · Status post operative washout on 4/1   · ID following and managing antibiotics  · Wound cultures and OR cultures are growing MRSA   · Currently on vancomycin, will need 4 weeks IV antibiotics followed by similar course of p o  Antibiotics, PICC line in place  · Blood cultures with no growth to date   · Analgesics as needed   · PT/OT recommending outpatient rehabilitation once medically stable  Case management aware of need for VNA for home intravenous antibiotics  Great toe pain, right  Assessment & Plan  Presumed gout  Continue Mobic x 3 days  Uric acid level WNL  Seen by ortho, no intervention   · X-rays pending radiology read however reviewed by Ortho  · Symptomatically improved overnight  · Avoid steroids given infection  · Could consider colchicine if persists    Factor V Leiden mutation [facility])  Assessment & Plan  On coumadin outpatient (home regimen 2 5 mg daily except Wed/Sat takes 5 mg daily)   Last dose 3/31  · Heparin gtt started on 4/2 per neurosurgery (per my d/w NSx they would like patient on ACS low protocol NOT VTE/High given recent surgery)   · Both MARCIN drains out as 4/8  · Cleared to start Coumadin 4/9  · Monitor PT/INR    Paroxysmal A-fib [facility])  Assessment & Plan  Continue with home dose BB/Flecainide  · Continue heparin drip, 2nd MARCIN drain removed late in day 4/8  Cleared to resume Coumadin 4/9  INR 1 17 4/10, Goal INR between 2 and 3  Cervical myelopathy [facility])  Assessment & Plan  Status post cervical spinal fusion on 3/11/22 as above   · See related plan     Headache-resolved as of 4/10/2022  Assessment & Plan  · Resolved  Anemia  Assessment & Plan  Appears to be postop anemia since recent surgery in March, hemoglobin between 9-10 since that time and previously within normal limits   · Hemoglobin was down-trending since operative washout, no signs or symptoms of active bleeding   · folate and B12 WNL  · Low Fe level however elevated ferritin  · Hemoglobin has been fluctuating but overall stable  Continue monitor and transfuse as needed  Mixed hyperlipidemia  Assessment & Plan  · Continue statin     Essential hypertension  Assessment & Plan  BP acceptable, monitor routinely   · Continue home dose Norvasc, metoprolol     WILL (obstructive sleep apnea)  Assessment & Plan  · CPAP qhs       VTE Pharmacologic Prophylaxis: VTE Score: 10 Moderate Risk (Score 3-4) - Pharmacological DVT Prophylaxis Ordered: heparin drip  Patient Centered Rounds: I performed bedside rounds with nursing staff today  Discussions with Specialists or Other Care Team Provider: nursing, neurosurgery [facility] and Discussions with Family / Patient: Patient  Family updates per primary team      Time Spent for Care: 30 minutes  More than 50% of total time spent on counseling and coordination of care as described above      Current Length of Stay: 10 day(s)    Current Patient Status: Inpatient Certification Statement: The patient will continue to require additional inpatient hospital stay due to pending therapeutic INR, otherwise, rest of plan per primary team     Discharge Plan: Anticipate discharge in 48-72 hrs to home with home services  Code Status: Level 1 - Full Code    Subjective:   Patient offers no acute complaints, still with some right toe pain but overall improved overnight  Denies headache today  Slept well  Objective:     Vitals:   Temp (24hrs), Av 1 °F (36 7 °C), Min:97 3 °F (36 3 °C), Max:99 3 °F (37 4 °C)    Temp:  [97 3 °F (36 3 °C)-99 3 °F (37 4 °C)] 97 3 °F (36 3 °C)  HR:  [57-64] 63  Resp:  [16] 16  BP: (133-141)/(60-69) 138/60  SpO2:  [90 %-95 %] 95 %  Body mass index is 32 78 kg/m²  Input and Output Summary (last 24 hours): Intake/Output Summary (Last 24 hours) at 4/10/2022 0918  Last data filed at 2022 1515  Gross per 24 hour   Intake 634 78 ml   Output 250 ml   Net 384 78 ml       Physical Exam:   Physical Exam  Vitals and nursing note reviewed  Constitutional:       Appearance: He is obese  Neck:      Comments: Cervical collar in place  Cardiovascular:      Rate and Rhythm: Normal rate  Heart sounds: Murmur heard  Pulmonary:      Breath sounds: Normal breath sounds  Abdominal:      Tenderness: There is no abdominal tenderness  Musculoskeletal:         General: Swelling (Trace lower extremity edema) and tenderness (Right great toe) present  Skin:     General: Skin is warm  Neurological:      Mental Status: He is alert and oriented to person, place, and time  Mental status is at baseline     Psychiatric:         Mood and Affect: Mood normal           Additional Data:     Labs:  Results from last 7 days   Lab Units 04/10/22  0501   WBC Thousand/uL 7 41   HEMOGLOBIN g/dL 7 6*   HEMATOCRIT % 23 8*   PLATELETS Thousands/uL 263   NEUTROS PCT % 67   LYMPHS PCT % 21   MONOS PCT % 8   EOS PCT % 2     Results from last 7 days   Lab Units 04/10/22  0501   SODIUM mmol/L 139   POTASSIUM mmol/L 3 8   CHLORIDE mmol/L 105   CO2 mmol/L 29   BUN mg/dL 12   CREATININE mg/dL 0 88   ANION GAP mmol/L 5   CALCIUM mg/dL 8 5   GLUCOSE RANDOM mg/dL 107     Results from last 7 days   Lab Units 04/10/22  0613   INR  1 17                   Lines/Drains:  Invasive Devices  Report    Peripherally Inserted Central Catheter Line            PICC Line 66/81/44 Right Basilic 7 days                Central Line:  Goal for removal: N/A - Discharging with PICC for IV ABX/medications             Imaging: No pertinent imaging reviewed      Recent Cultures (last 7 days):         Last 24 Hours Medication List:   Current Facility-Administered Medications   Medication Dose Route Frequency Provider Last Rate    acetaminophen  975 mg Oral TID PRN Alison Biundo, PA-C      amLODIPine  5 mg Oral Daily Alison Biundo, PA-C      DULoxetine  60 mg Oral Daily Alison Biundo, PA-C      flecainide  100 mg Oral BID Alison Biundo, PA-C      fluticasone  1 spray Each Nare Daily DEEPTHI Wisdom      gabapentin  100 mg Oral Daily Alison Biundo, PA-C      gabapentin  300 mg Oral HS Alison Biundo, PA-C      heparin (porcine)  3-20 Units/kg/hr (Order-Specific) Intravenous Titrated Fawnsuni Monroy PA-C 19 1 Units/kg/hr (04/10/22 0650)    magnesium sulfate  2 g Intravenous Once DEEPTHI Wisdom      meloxicam  15 mg Oral Daily Nicole Curry MD      methocarbamol  750 mg Oral Q6H PRN DEEPTHI Sanders      metoclopramide  10 mg Intravenous Once DEEPTHI Mcghee      metoprolol succinate  100 mg Oral Daily Alison Biundo, PA-C      ondansetron  4 mg Intravenous Q6H PRN Alison Biundo, PA-C      oxyCODONE  2 5 mg Oral Q4H PRN Alison Biundo, PA-C      oxyCODONE  5 mg Oral Q4H PRN Alison Biundo, PA-C      polyethylene glycol  17 g Oral Daily PRN DEEPTHI Chacon      potassium chloride  40 mEq Oral Daily Alison Biundo, FABIOLA      pravastatin  40 mg Oral Daily Alison Faria PA-C      senna  8 6 mg Oral Daily Alison Faria PA-C      tamsulosin  0 4 mg Oral Daily With FABIOLA Pradhan      vancomycin  1,250 mg Intravenous Q12H Jerome Crook PA-C 1,250 mg (04/09/22 2303)    warfarin  5 mg Oral Daily (warfarin) DEEPTHI Summers          Today, Patient Was Seen By: DEEPTHI Barrett    **Please Note: This note may have been constructed using a voice recognition system  **

## 2022-04-10 NOTE — ASSESSMENT & PLAN NOTE
Continue with home dose BB/Flecainide  · Continue heparin drip, 2nd MARCIN drain removed late in day 4/8  Cleared to resume Coumadin 4/9  INR 1 17 4/10, Goal INR between 2 and 3

## 2022-04-10 NOTE — ASSESSMENT & PLAN NOTE
Hx of cervical myelopathy, s/p anterior cervical discectomy and fixation fusion C5/6 and C6/7; Posterior cervical decompression and instrumented fusion C3-T1 with Dr Jose L Henderson on 3/11/22  Discharged to [facility] for rehab on 3/18, then discharged home on 3/29  Noted to have increased redness and drainage at incision site, no improvement after 6 days of Keflex  Referred to ED from OP office visit for further management  · CT cervical spine: Large posterior soft tissue fluid collection, morphology suspicious for infection as clinically suspected  Study is nondiagnostic for evaluation of the central canal and epidural abscess  No specific findings of osteomyelitis  · Admitted under neurosurgery service  · Status post operative washout on 4/1   · ID following and managing antibiotics  · Wound cultures and OR cultures are growing MRSA   · Currently on vancomycin, will need 4 weeks IV antibiotics followed by similar course of p o  Antibiotics, PICC line in place  · Blood cultures with no growth to date   · Analgesics as needed   · PT/OT recommending outpatient rehabilitation once medically stable  Case management aware of need for VNA for home intravenous antibiotics

## 2022-04-11 LAB
APTT PPP: 158 SECONDS (ref 23–37)
APTT PPP: 46 SECONDS (ref 23–37)
APTT PPP: 67 SECONDS (ref 23–37)
BASOPHILS # BLD AUTO: 0.07 THOUSANDS/ΜL (ref 0–0.1)
BASOPHILS NFR BLD AUTO: 1 % (ref 0–1)
EOSINOPHIL # BLD AUTO: 0.17 THOUSAND/ΜL (ref 0–0.61)
EOSINOPHIL NFR BLD AUTO: 2 % (ref 0–6)
ERYTHROCYTE [DISTWIDTH] IN BLOOD BY AUTOMATED COUNT: 13.6 % (ref 11.6–15.1)
HCT VFR BLD AUTO: 23.8 % (ref 36.5–49.3)
HGB BLD-MCNC: 7.7 G/DL (ref 12–17)
IMM GRANULOCYTES # BLD AUTO: 0.05 THOUSAND/UL (ref 0–0.2)
IMM GRANULOCYTES NFR BLD AUTO: 1 % (ref 0–2)
INR PPP: 1.27 (ref 0.84–1.19)
LYMPHOCYTES # BLD AUTO: 1.74 THOUSANDS/ΜL (ref 0.6–4.47)
LYMPHOCYTES NFR BLD AUTO: 25 % (ref 14–44)
MCH RBC QN AUTO: 30 PG (ref 26.8–34.3)
MCHC RBC AUTO-ENTMCNC: 32.4 G/DL (ref 31.4–37.4)
MCV RBC AUTO: 93 FL (ref 82–98)
MONOCYTES # BLD AUTO: 0.73 THOUSAND/ΜL (ref 0.17–1.22)
MONOCYTES NFR BLD AUTO: 11 % (ref 4–12)
NEUTROPHILS # BLD AUTO: 4.2 THOUSANDS/ΜL (ref 1.85–7.62)
NEUTS SEG NFR BLD AUTO: 60 % (ref 43–75)
NRBC BLD AUTO-RTO: 0 /100 WBCS
PLATELET # BLD AUTO: 276 THOUSANDS/UL (ref 149–390)
PMV BLD AUTO: 9.4 FL (ref 8.9–12.7)
PROTHROMBIN TIME: 15.4 SECONDS (ref 11.6–14.5)
RBC # BLD AUTO: 2.57 MILLION/UL (ref 3.88–5.62)
WBC # BLD AUTO: 6.96 THOUSAND/UL (ref 4.31–10.16)

## 2022-04-11 PROCEDURE — 85730 THROMBOPLASTIN TIME PARTIAL: CPT | Performed by: NEUROLOGICAL SURGERY

## 2022-04-11 PROCEDURE — 85025 COMPLETE CBC W/AUTO DIFF WBC: CPT | Performed by: NURSE PRACTITIONER

## 2022-04-11 PROCEDURE — 85730 THROMBOPLASTIN TIME PARTIAL: CPT | Performed by: NURSE PRACTITIONER

## 2022-04-11 PROCEDURE — 99024 POSTOP FOLLOW-UP VISIT: CPT | Performed by: NURSE PRACTITIONER

## 2022-04-11 PROCEDURE — 99232 SBSQ HOSP IP/OBS MODERATE 35: CPT | Performed by: INTERNAL MEDICINE

## 2022-04-11 PROCEDURE — 85610 PROTHROMBIN TIME: CPT | Performed by: NURSE PRACTITIONER

## 2022-04-11 RX ADMIN — VANCOMYCIN HYDROCHLORIDE 1250 MG: 10 INJECTION, POWDER, LYOPHILIZED, FOR SOLUTION INTRAVENOUS at 00:48

## 2022-04-11 RX ADMIN — METOPROLOL SUCCINATE 100 MG: 100 TABLET, EXTENDED RELEASE ORAL at 09:53

## 2022-04-11 RX ADMIN — ACETAMINOPHEN 975 MG: 325 TABLET ORAL at 05:44

## 2022-04-11 RX ADMIN — MELOXICAM 15 MG: 7.5 TABLET ORAL at 09:53

## 2022-04-11 RX ADMIN — TAMSULOSIN HYDROCHLORIDE 0.4 MG: 0.4 CAPSULE ORAL at 16:55

## 2022-04-11 RX ADMIN — WARFARIN SODIUM 5 MG: 5 TABLET ORAL at 16:55

## 2022-04-11 RX ADMIN — FLECAINIDE ACETATE 100 MG: 100 TABLET ORAL at 16:55

## 2022-04-11 RX ADMIN — FLECAINIDE ACETATE 100 MG: 100 TABLET ORAL at 09:53

## 2022-04-11 RX ADMIN — ACETAMINOPHEN 975 MG: 325 TABLET ORAL at 22:05

## 2022-04-11 RX ADMIN — GABAPENTIN 100 MG: 100 CAPSULE ORAL at 09:52

## 2022-04-11 RX ADMIN — PRAVASTATIN SODIUM 40 MG: 40 TABLET ORAL at 09:53

## 2022-04-11 RX ADMIN — GABAPENTIN 300 MG: 300 CAPSULE ORAL at 21:21

## 2022-04-11 RX ADMIN — POTASSIUM CHLORIDE 40 MEQ: 20 TABLET, EXTENDED RELEASE ORAL at 09:53

## 2022-04-11 RX ADMIN — DULOXETINE HYDROCHLORIDE 60 MG: 60 CAPSULE, DELAYED RELEASE ORAL at 09:52

## 2022-04-11 RX ADMIN — ACETAMINOPHEN 975 MG: 325 TABLET ORAL at 09:52

## 2022-04-11 RX ADMIN — VANCOMYCIN HYDROCHLORIDE 1250 MG: 10 INJECTION, POWDER, LYOPHILIZED, FOR SOLUTION INTRAVENOUS at 11:42

## 2022-04-11 RX ADMIN — FLUTICASONE PROPIONATE 1 SPRAY: 50 SPRAY, METERED NASAL at 09:51

## 2022-04-11 RX ADMIN — AMLODIPINE BESYLATE 5 MG: 5 TABLET ORAL at 09:53

## 2022-04-11 RX ADMIN — HEPARIN SODIUM 16.1 UNITS/KG/HR: 10000 INJECTION, SOLUTION INTRAVENOUS; SUBCUTANEOUS at 17:35

## 2022-04-11 NOTE — ASSESSMENT & PLAN NOTE
Presumed gout right 1st MRP joint  Continue Mobic x 3 days  Uric acid level WNL   Seen by ortho, no intervention   · 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  · Symptomatically improved   · Avoid steroids given infection

## 2022-04-11 NOTE — PROGRESS NOTES
[address] [facility]  Progress Note - Praveen Hill 1950, 71 y o  male MRN: [id number]  Unit/Bed#: [facility] 655-69 Encounter: [id number]  Primary Care Provider: Gricel Turk MD   Date and time admitted to hospital: 3/31/2022 10:58 AM    Great toe pain, right  Assessment & Plan  · Complaints or R great toe pain with warmth on 4/8  · Quick note placed documenting plan for uric acid level, orthopedics consult and mobic 15mg x3 days (completed 4/10)  · Further medication management with NSAIDS and steroids should be avoided  · patient post op from cervical fusion and NSAIDs can inhibit bony fusion formation  Plan to DC after 3 days  Cervical myelopathy (HCC)  Assessment & Plan  POD 10 posterior cervical evacuation of postoperative collection and debridement with placement of drains C3-T1  · S/p Anterior cervical discectomy and fixation fusion C5/6 and C6/7; Posterior cervical decompression and instrumented fusion C3-T1 with Dr Jocelyne Melendrez on 3/11/22  · Presented as a direct admission from the office on 03/31 with concerns for wound infection    Imaging:   · CT Cervical w/ contrast 3/31/2022: Large posterior soft tissue fluid collection, morphology suspicious for infection as clinically suspected  Study is nondiagnostic for evaluation of the central canal and epidural abscess  MR is better suited intracanalicular evaluation although will be limited by artifact from metal hardware  Plan:   Continue to monitor neurological exam and symptoms   Blood Cx no growth after 5 days    Wound cx 3/31 + MRSA   Intraop cx tissue culture 4/1 + MRSA   [place] collar to remain in place at all times, okay for [place] collar for showering   MARCIN drains:  o Left MARCIN removed 4/8/2022  o Right MARCIN removed 4/6/2022  · SLIM and ID consulted - appreciate recommendations    · Patient will require 4 weeks of IV vancomycin followed by 4 weeks of p o  antibiotics  · PICC in place  · Vancomycin 1,5000 mg q12h  · Continue to monitor incision - photo in chart under media  · Pain well controlled on current pain regimen:  · Gabapentin 100mg daily and 300mg at HS  · Tylenol 975 mg TID prn  · Robaxin 750 mg q 6 hours p r n   · Oxycodone 2 5-5mg q 4 hours p r n  For moderate or severe pain  · Bowel regimen: Senokot, and MiraLax  · Mobilize with PT/OT  Home with outpatient rehab  · Mobilize as tolerated  · DVT ppx: SCDs, on heparin gtt  · Patient started on coumadin   · Plan of care discussed with RN Kathlen Dubin  Neurosurgery will follow as primary, call with any further questions or concerns  Factor V Leiden mutation ([facility] [address] )  Assessment & Plan  · On heparin gtt, PTT this am was 158  · No active signs of bleeding, continue to monitor incision  · Started on coumadin 5mg daily   · INR 1 27  · Management per SLIM  Continue transition to oral AC at this time  Subjective/Objective   Chief Complaint: "I'm doing ok "    Subjective: Denies any new numbness or weakness  States he is mobilizing well and walking hallways with walker  States he thought his incision looked a little more red than it had yesterday  Denies any fever or pain  Objective: NAD  Photo of incision in chart under media  I/O       04/09 0701  04/10 0700 04/10 0701  04/11 0700 04/11 0701  04/12 0700    P  O  480 480     I V  (mL/kg) 314 8 (2 9) 610 6 (5 7)     IV Piggyback 250 250     Total Intake(mL/kg) 1044 8 (9 8) 1340 6 (12 5)     Urine (mL/kg/hr) 550 (0 2) 300 (0 1)     Total Output 550 300     Net +494 8 +1040 6            Unmeasured Urine Occurrence  2 x     Unmeasured Stool Occurrence  1 x           Invasive Devices  Report    Peripherally Inserted Central Catheter Line            PICC Line 96/01/82 Right Basilic 8 days                Physical Exam:  Vitals: Blood pressure 146/67, pulse 63, temperature 98 1 °F (36 7 °C), resp  rate 19, height 5' 11" (1 803 m), weight 107 kg (235 lb), SpO2 92 %  ,Body mass index is 32 78 kg/m²          General appearance: alert, appears stated age, cooperative and no distress  Head: Normocephalic, without obvious abnormality, atraumatic  Eyes: EOMI, PERRL  Neck: cervical brace, incision clean and dry with retention sutures  Back: no kyphosis present, no tenderness to percussion or palpation  Lungs: non labored breathing  Heart: regular heart rate  Neurologic:   Mental status: Alert, oriented x3, thought content appropriate  Cranial nerves: grossly intact (Cranial nerves II-XII)  Sensory: normal to LT  Motor: moving all extremities without focal weakness, RUE weakness +4/5  Reflexes: 2+ and symmetric, no Jiménez's or clonus      Lab Results:  Results from last 7 days   Lab Units 04/11/22  0440 04/10/22  0501 04/09/22  0632   WBC Thousand/uL 6 96 7 41 7 61   HEMOGLOBIN g/dL 7 7* 7 6* 7 4*   HEMATOCRIT % 23 8* 23 8* 22 9*   PLATELETS Thousands/uL 276 263 269   NEUTROS PCT % 60 67 68   MONOS PCT % 11 8 8     Results from last 7 days   Lab Units 04/10/22  0501 04/07/22  0515 04/05/22  1359   POTASSIUM mmol/L 3 8 3 6 3 8   CHLORIDE mmol/L 105 106 106   CO2 mmol/L 29 30 31   BUN mg/dL 12 9 10   CREATININE mg/dL 0 88 0 74 0 64   CALCIUM mg/dL 8 5 8 6 8 9             Results from last 7 days   Lab Units 04/11/22  0440 04/11/22  0235 04/10/22  2033 04/10/22  1323 04/10/22  0613 04/10/22  0613 04/09/22  1411 04/09/22  0632   INR  1 27*  --   --   --   --  1 17  --  1 14   PTT seconds  --  158* 69* 105*   < > 55*   < > 52*    < > = values in this interval not displayed  No results found for: TROPONINT  ABG:No results found for: PHART, XXJ1EMQ, PO2ART, LII2WVL, K9NYDRTQ, BEART, SOURCE    Imaging Studies: I have personally reviewed pertinent reports  and I have personally reviewed pertinent films in PACS    CT spine cervical w contrast    Result Date: 3/31/2022  Impression: Large posterior soft tissue fluid collection, morphology suspicious for infection as clinically suspected   Study is nondiagnostic for evaluation of the central canal and epidural abscess  MR is better suited intracanalicular evaluation although will be limited by artifact from metal hardware  No specific findings of osteomyelitis  Workstation performed: [id number]       EKG, Pathology, and Other Studies: I have personally reviewed pertinent reports        VTE Pharmacologic Prophylaxis: Sequential compression device (Venodyne)  and Heparin    VTE Mechanical Prophylaxis: sequential compression device

## 2022-04-11 NOTE — PROGRESS NOTES
Progress Note - Infectious Disease   Asiya George 71 y o  male MRN: [id number]  Unit/Bed#: [facility] 608-01 Encounter: [id number]      Impression:  1  Postoperative MRSA cervical wound infection R/0 vertebral osteomyelitis S/P posterior cervical a VAC UA shin of postoperative collection and debridement with placement of drains C3-T1 POD 10  2  S/P  anterior cervical diskectomy and fixation fusion C5-6 and C6-7 posterior cervical decompression and instrumented fusion C3-T1 on 22  3  History of PAF on Coumadin  4  Factor 5 Leiden mutation  5  WILL    Recommendations:  Afebrile with normal WBC count   Hemoglobin is 7 7  Wound dressing is dry erythema is unchanged from yesterday  There is no tenderness or areas of fluctuance  1  Final cultures are showing MRSA  2  Continue vancomycin IV with further adjustment of dose by pharmacy  3  Would anticipate at least a four-week IV vancomycin course followed by approximately 4 weeks of p o  Rx   4  Uric acid was WNL taken to evaluate right 1st MTP joint pain  Pain is reduced on Mobic          Antibiotics:  1  Vancomycin 1 25 gm q 12 hours IV, day 11 of 28 Rx    Subjective:  Has twinges of left lateral neck discomfort  Painful swelling of his right 1st MTP joint is reduced   Denies fevers, chills, or sweats  Denies nausea, vomiting, or diarrhea  Objective:  Vitals:  Temp:  [98 1 °F (36 7 °C)-99 1 °F (37 3 °C)] 98 4 °F (36 9 °C)  HR:  [59-66] 59  Resp:  [16-19] 19  BP: (139-146)/(66-68) 139/66  SpO2:  [92 %-94 %] 94 %  Temp (24hrs), Av 5 °F (36 9 °C), Min:98 1 °F (36 7 °C), Max:99 1 °F (37 3 °C)  Current: Temperature: 98 4 °F (36 9 °C)    Physical Exam:     General Appearance:  Alert, nontoxic, no acute distress  [place] collar in place  Posterior cervical wound with former MARCIN drain sites clean and dry   Erythema that surrounds the wound appears about the same as yesterday  No point tenderness or discharge    Appears comfortable sitting in bed   Throat: Oropharynx moist without lesions  Lips, mucosa, and tongue normal   Neck: [place] collar with posterior cervical wound with staples and retention sutures and 1 MARCIN drain with modest serosanguineous drainage  Dressing is dry  1+ surrounding erythema as per picture on chart   Lungs:   Clear to auscultation bilaterally, no audible wheezes, rhonchi or rales; respirations unlabored   Heart:  Regular rate and rhythm, S1, S2 normal, no murmur, rub or gallop   Abdomen:   Soft, non-tender, surgical scars non-distended, positive bowel sounds  No masses, no organomegaly    No CVA tenderness   Extremities: Reduced swelling right 1st MTP joint, no clubbing, cyanosis or edema  PICC line right basilic   Skin: As above, surgical scar  Invasive Devices  Report    Peripherally Inserted Central Catheter Line            PICC Line 90/16/57 Right Basilic 9 days                Labs, Imaging, & Other studies:   All pertinent labs were personally reviewed  Results from last 7 days   Lab Units 04/11/22  0440 04/10/22  0501 04/09/22  0632   WBC Thousand/uL 6 96 7 41 7 61   HEMOGLOBIN g/dL 7 7* 7 6* 7 4*   PLATELETS Thousands/uL 276 263 269     Results from last 7 days   Lab Units 04/10/22  0501 04/07/22  0515 04/07/22  0515 04/05/22  1359 04/05/22  1359   SODIUM mmol/L 139  --  139  --  139   POTASSIUM mmol/L 3 8   < > 3 6   < > 3 8   CHLORIDE mmol/L 105   < > 106   < > 106   CO2 mmol/L 29   < > 30   < > 31   BUN mg/dL 12   < > 9   < > 10   CREATININE mg/dL 0 88   < > 0 74   < > 0 64   EGFR ml/min/1 73sq m 86   < > 92   < > 98   CALCIUM mg/dL 8 5   < > 8 6   < > 8 9    < > = values in this interval not displayed

## 2022-04-11 NOTE — ASSESSMENT & PLAN NOTE
· On heparin gtt, PTT this am was 158  · No active signs of bleeding, continue to monitor incision  · Started on coumadin 5mg daily   · INR 1 27  · Management per SLIM  Continue transition to oral AC at this time

## 2022-04-11 NOTE — PROGRESS NOTES
Vancomycin IV Pharmacy-to-Dose Consultation    Bg Thomas is a 71 y o  male who is currently receiving Vancomycin IV with management by the Pharmacy Consult service  Assessment/Plan:  The patient was reviewed  Renal function is stable and no signs or symptoms of nephrotoxicity and/or infusion reactions were documented in the chart  Based on todays assessment, continue current vancomycin (day # 11) dosing of 1250 mg IV q12h, with a plan for trough to be drawn at 1100 on 4/12  We will continue to follow the patients culture results and clinical progress daily      Harper Brown, Pharmacist

## 2022-04-11 NOTE — PROGRESS NOTES
[facility]  Progress Note - Rajeev Duenas 1950, 71 y o  male MRN: [id number]  Unit/Bed#: [facility] 371-32 Encounter: [id number]  Primary Care Provider: Alexandra Sawyer MD   Date and time admitted to hospital: 3/31/2022 10:58 AM    * Surgical site infection  Assessment & Plan  Hx of cervical myelopathy, s/p anterior cervical discectomy and fixation fusion C5/6 and C6/7; Posterior cervical decompression and instrumented fusion C3-T1 with Dr Radha Polk on 3/11/22  Discharged to [facility] for rehab on 3/18, then discharged home on 3/29  Noted to have increased redness and drainage at incision site, no improvement after 6 days of Keflex  Referred to ED from OP office visit for further management  · CT cervical spine: Large posterior soft tissue fluid collection, morphology suspicious for infection as clinically suspected  Study is nondiagnostic for evaluation of the central canal and epidural abscess  No specific findings of osteomyelitis  · Admitted under neurosurgery service  · Status post operative washout on 4/1   · ID following and managing antibiotics  · Wound cultures and OR cultures are growing MRSA   · Currently on vancomycin, will need 4 weeks IV antibiotics followed by similar course of p o  Antibiotics, PICC line in place  · Blood cultures - no growth after 5 days    · Analgesics as needed   · PT/OT recommending outpatient rehabilitation once medically stable  Case management aware of need for VNA for home intravenous antibiotics  Factor V Leiden mutation [facility])  Assessment & Plan  With h/o DVT  On coumadin outpatient (home regimen 2 5 mg daily except Wed/Sat takes 5 mg daily)   Last dose 3/31  · Heparin gtt started on 4/2 per neurosurgery (per my d/w NSx they would like patient on ACS low protocol NOT VTE/High given recent surgery)   · Both MARCIN drains out as 4/8  · Cleared to start Coumadin 4/9  · INR 1 27 today  · Monitor PT/INR   Goal INR 2-3    Paroxysmal A-fib [facility])  Assessment & Plan  Continue with home dose BB/Flecainide  · Continue heparin drip, 2nd MARCIN drain removed late in day 4  Cleared to resume Coumadin   INR 1 27 4/10, Goal INR between 2 and 3  WILL (obstructive sleep apnea)  Assessment & Plan  · CPAP qhs     Mixed hyperlipidemia  Assessment & Plan  · Continue statin     Essential hypertension  Assessment & Plan  BP acceptable, monitor routinely   · Continue home dose Norvasc, metoprolol     Cervical myelopathy (HCC)  Assessment & Plan  Status post cervical spinal fusion on 3/11/22 as above   · See related plan     Anemia  Assessment & Plan  Appears to be postop anemia since recent surgery in March, hemoglobin between 9-10 since that time and previously within normal limits   · Hemoglobin was down-trending since operative washout, no signs or symptoms of active bleeding   · folate and B12 WNL  · Low Fe level however elevated ferritin  · Hemoglobin has been fluctuating but overall stable  Continue monitor and transfuse as needed  Great toe pain, right  Assessment & Plan  Presumed gout right 1st MRP joint  Continue Mobic x 3 days  Uric acid level WNL  Seen by ortho, no intervention   · 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  · Symptomatically improved   · Avoid steroids given infection      VTE Pharmacologic Prophylaxis: VTE Score: 10 High Risk (Score >/= 5) - Pharmacological DVT Prophylaxis Ordered: heparin drip  Sequential Compression Devices Ordered  Patient Centered Rounds: I performed bedside rounds with nursing staff today  Time Spent for Care: 20 minutes  More than 50% of total time spent on counseling and coordination of care as described above  Current Length of Stay: 11 day(s)  Current Patient Status: Inpatient     Code Status: Level 1 - Full Code    Subjective:   Neck pain controlled  No fever   Right great toe pain resolved    Objective:     Vitals:   Temp (24hrs), Av 5 °F (36 9 °C), Min:98 1 °F (36 7 °C), Max:99 1 °F (37 3 °C)    Temp:  [98 1 °F (36 7 °C)-99 1 °F (37 3 °C)] 98 4 °F (36 9 °C)  HR:  [59-66] 59  Resp:  [16-19] 19  BP: (139-146)/(66-68) 139/66  SpO2:  [92 %-94 %] 94 %  Body mass index is 32 78 kg/m²  Physical Exam:   Physical Exam  Vitals reviewed  HENT:      Head: Normocephalic  Nose: Nose normal       Mouth/Throat:      Mouth: Mucous membranes are moist    Eyes:      Extraocular Movements: Extraocular movements intact  Cardiovascular:      Rate and Rhythm: Normal rate and regular rhythm  Pulmonary:      Effort: Pulmonary effort is normal  No respiratory distress  Breath sounds: Normal breath sounds  No wheezing  Abdominal:      General: Bowel sounds are normal  There is no distension  Palpations: Abdomen is soft  Tenderness: There is no abdominal tenderness  Musculoskeletal:         General: No swelling  Cervical back: Neck supple  Skin:     General: Skin is warm and dry  Neurological:      Mental Status: He is alert and oriented to person, place, and time     Psychiatric:         Mood and Affect: Mood normal          Behavior: Behavior normal           Additional Data:     Labs:  Results from last 7 days   Lab Units 04/11/22  0440   WBC Thousand/uL 6 96   HEMOGLOBIN g/dL 7 7*   HEMATOCRIT % 23 8*   PLATELETS Thousands/uL 276   NEUTROS PCT % 60   LYMPHS PCT % 25   MONOS PCT % 11   EOS PCT % 2     Results from last 7 days   Lab Units 04/10/22  0501   SODIUM mmol/L 139   POTASSIUM mmol/L 3 8   CHLORIDE mmol/L 105   CO2 mmol/L 29   BUN mg/dL 12   CREATININE mg/dL 0 88   ANION GAP mmol/L 5   CALCIUM mg/dL 8 5   GLUCOSE RANDOM mg/dL 107     Results from last 7 days   Lab Units 04/11/22  0440   INR  1 27*                   Lines/Drains:  Invasive Devices  Report    Peripherally Inserted Central Catheter Line            PICC Line 52/76/34 Right Basilic 9 days                Central Line:  Goal for removal: N/A - Discharging with PICC for IV ABX/medications        Last 24 Hours Medication List:   Current Facility-Administered Medications   Medication Dose Route Frequency Provider Last Rate    acetaminophen  975 mg Oral TID PRN Alison Biundo, FABIOLA      amLODIPine  5 mg Oral Daily Alison Biundo, PA-ALTAGRACIA      DULoxetine  60 mg Oral Daily Alison Biundo, PA-C      flecainide  100 mg Oral BID Alison Biundo, PA-C      fluticasone  1 spray Each Nare Daily DEEPTHI Wisdom      gabapentin  100 mg Oral Daily Alison Biundo, FABIOLA      gabapentin  300 mg Oral HS Alison Biundo, PA-C      heparin (porcine)  3-20 Units/kg/hr (Order-Specific) Intravenous Titrated Alfredo Erwin PA-C 16 1 Units/kg/hr (04/11/22 1352)    magnesium sulfate  2 g Intravenous Once DEEPTHI Wisdom      methocarbamol  750 mg Oral Q6H PRN DEEPTHI Sanders      metoclopramide  10 mg Intravenous Once DEEPTHI Mcghee      metoprolol succinate  100 mg Oral Daily Alison Biundo, FABIOLA      ondansetron  4 mg Intravenous Q6H PRN Alison Biundo, FABIOLA      oxyCODONE  2 5 mg Oral Q4H PRN Alison Biundo, PA-ALTAGRACIA      oxyCODONE  5 mg Oral Q4H PRN Alison Biundo, PA-C      polyethylene glycol  17 g Oral Daily PRN Alisson Alejandra, DEEPTHI      potassium chloride  40 mEq Oral Daily Alison Biundo, PA-C      pravastatin  40 mg Oral Daily Alison Biundo, PA-C      senna  8 6 mg Oral Daily Alison Biundo, PA-C      tamsulosin  0 4 mg Oral Daily With FABIOLA Pradhan      vancomycin  1,250 mg Intravenous Q12H VEENA Ahumada-C 1,250 mg (04/11/22 1142)    warfarin  5 mg Oral Daily (warfarin) DEEPTHI Agarwal          Today, Patient Was Seen By: Errol Frank MD    **Please Note: This note may have been constructed using a voice recognition system  **

## 2022-04-11 NOTE — ASSESSMENT & PLAN NOTE
Hx of cervical myelopathy, s/p anterior cervical discectomy and fixation fusion C5/6 and C6/7; Posterior cervical decompression and instrumented fusion C3-T1 with Dr Vladimir Campuzano on 3/11/22  Discharged to [facility] for rehab on 3/18, then discharged home on 3/29  Noted to have increased redness and drainage at incision site, no improvement after 6 days of Keflex  Referred to ED from OP office visit for further management  · CT cervical spine: Large posterior soft tissue fluid collection, morphology suspicious for infection as clinically suspected  Study is nondiagnostic for evaluation of the central canal and epidural abscess  No specific findings of osteomyelitis  · Admitted under neurosurgery service  · Status post operative washout on 4/1   · ID following and managing antibiotics  · Wound cultures and OR cultures are growing MRSA   · Currently on vancomycin, will need 4 weeks IV antibiotics followed by similar course of p o  Antibiotics, PICC line in place  · Blood cultures - no growth after 5 days    · Analgesics as needed   · PT/OT recommending outpatient rehabilitation once medically stable  Case management aware of need for VNA for home intravenous antibiotics

## 2022-04-11 NOTE — PLAN OF CARE
Problem: PAIN - ADULT  Goal: Verbalizes/displays adequate comfort level or baseline comfort level  Description: Interventions:  - Encourage patient to monitor pain and request assistance  - Assess pain using appropriate pain scale  - Administer analgesics based on type and severity of pain and evaluate response  - Implement non-pharmacological measures as appropriate and evaluate response  - Consider cultural and social influences on pain and pain management  - Notify physician/advanced practitioner if interventions unsuccessful or patient reports new pain  Outcome: Progressing     Problem: INFECTION - ADULT  Goal: Absence or prevention of progression during hospitalization  Description: INTERVENTIONS:  - Assess and monitor for signs and symptoms of infection  - Monitor lab/diagnostic results  - Monitor all insertion sites, i e  indwelling lines, tubes, and drains  - Monitor endotracheal if appropriate and nasal secretions for changes in amount and color  - [place] appropriate cooling/warming therapies per order  - Administer medications as ordered  - Instruct and encourage patient and family to use good hand hygiene technique  - Identify and instruct in appropriate isolation precautions for identified infection/condition  Outcome: Progressing  Goal: Absence of fever/infection during neutropenic period  Description: INTERVENTIONS:  - Monitor WBC    Outcome: Progressing     Problem: SAFETY ADULT  Goal: Patient will remain free of falls  Description: INTERVENTIONS:  - Educate patient/family on patient safety including physical limitations  - Instruct patient to call for assistance with activity   - Consult OT/PT to assist with strengthening/mobility   - Keep Call bell within reach  - Keep bed low and locked with side rails adjusted as appropriate  - Keep care items and personal belongings within reach  - Initiate and maintain comfort rounds  - Make Fall Risk Sign visible to staff  - Offer Toileting   - Obtain necessary fall risk management equipment  - Apply yellow socks and bracelet for high fall risk patients  - Consider moving patient to room near nurses station  Outcome: Progressing  Goal: Maintain or return to baseline ADL function  Description: INTERVENTIONS:  -  Assess patient's ability to carry out ADLs; assess patient's baseline for ADL function and identify physical deficits which impact ability to perform ADLs (bathing, care of mouth/teeth, toileting, grooming, dressing, etc )  - Assess/evaluate cause of self-care deficits   - Assess range of motion  - Assess patient's mobility; develop plan if impaired  - Assess patient's need for assistive devices and provide as appropriate  - Encourage maximum independence but intervene and supervise when necessary  - Involve family in performance of ADLs  - Assess for home care needs following discharge   - Consider OT consult to assist with ADL evaluation and planning for discharge  - Provide patient education as appropriate  Outcome: Progressing  Goal: Maintains/Returns to pre admission functional level  Description: INTERVENTIONS:  - Perform BMAT or MOVE assessment daily    - Set and communicate daily mobility goal to care team and patient/family/caregiver     - Collaborate with rehabilitation services on mobility goals if consulted  - Out of bed for toileting  - Record patient progress and toleration of activity level   Outcome: Progressing     Problem: DISCHARGE PLANNING  Goal: Discharge to home or other facility with appropriate resources  Description: INTERVENTIONS:  - Identify barriers to discharge w/patient and caregiver  - Arrange for needed discharge resources and transportation as appropriate  - Identify discharge learning needs (meds, wound care, etc )  - Arrange for interpretive services to assist at discharge as needed  - Refer to Case Management Department for coordinating discharge planning if the patient needs post-hospital services based on physician/advanced practitioner order or complex needs related to functional status, cognitive ability, or social support system  Outcome: Progressing     Problem: Knowledge Deficit  Goal: Patient/family/caregiver demonstrates understanding of disease process, treatment plan, medications, and discharge instructions  Description: Complete learning assessment and assess knowledge base    Interventions:  - Provide teaching at level of understanding  - Provide teaching via preferred learning methods  Outcome: Progressing     Problem: Potential for Falls  Goal: Patient will remain free of falls  Description: INTERVENTIONS:  - Educate patient/family on patient safety including physical limitations  - Instruct patient to call for assistance with activity   - Consult OT/PT to assist with strengthening/mobility   - Keep Call bell within reach  - Keep bed low and locked with side rails adjusted as appropriate  - Keep care items and personal belongings within reach  - Initiate and maintain comfort rounds  - Make Fall Risk Sign visible to staff  - Offer [address] necessary fall risk management equipment  - Apply yellow socks and bracelet for high fall risk patients  - Consider moving patient to room near nurses station  Outcome: Progressing     Problem: MOBILITY - ADULT  Goal: Maintain or return to baseline ADL function  Description: INTERVENTIONS:  -  Assess patient's ability to carry out ADLs; assess patient's baseline for ADL function and identify physical deficits which impact ability to perform ADLs (bathing, care of mouth/teeth, toileting, grooming, dressing, etc )  - Assess/evaluate cause of self-care deficits   - Assess range of motion  - Assess patient's mobility; develop plan if impaired  - Assess patient's need for assistive devices and provide as appropriate  - Encourage maximum independence but intervene and supervise when necessary  - Involve family in performance of ADLs  - Assess for home care needs following discharge   - Consider OT consult to assist with ADL evaluation and planning for discharge  - Provide patient education as appropriate  Outcome: Progressing  Goal: Maintains/Returns to pre admission functional level  Description: INTERVENTIONS:  - Perform BMAT or MOVE assessment daily    - Set and communicate daily mobility goal to care team and patient/family/caregiver     - Collaborate with rehabilitation services on mobility goals if consulted  - Out of bed for toileting  - Record patient progress and toleration of activity level   Outcome: Progressing     Problem: Prexisting or High Potential for Compromised Skin Integrity  Goal: Skin integrity is maintained or improved  Description: INTERVENTIONS:  - Identify patients at risk for skin breakdown  - Assess and monitor skin integrity  - Assess and monitor nutrition and hydration status  - Monitor labs   - Assess for incontinence   - Turn and reposition patient  - Assist with mobility/ambulation  - Relieve pressure over bony prominences  - Avoid friction and shearing  - Provide appropriate hygiene as needed including keeping skin clean and dry  - Evaluate need for skin moisturizer/barrier cream  - Collaborate with interdisciplinary team   - Patient/family teaching  - Consider wound care consult   Outcome: Progressing     Problem: MUSCULOSKELETAL - ADULT  Goal: Maintain or return mobility to safest level of function  Description: INTERVENTIONS:  - Assess patient's ability to carry out ADLs; assess patient's baseline for ADL function and identify physical deficits which impact ability to perform ADLs (bathing, care of mouth/teeth, toileting, grooming, dressing, etc )  - Assess/evaluate cause of self-care deficits   - Assess range of motion  - Assess patient's mobility  - Assess patient's need for assistive devices and provide as appropriate  - Encourage maximum independence but intervene and supervise when necessary  - Involve family in performance of ADLs  - Assess for home care needs following discharge   - Consider OT consult to assist with ADL evaluation and planning for discharge  - Provide patient education as appropriate  Outcome: Progressing  Goal: Maintain proper alignment of affected body part  Description: INTERVENTIONS:  - Support, maintain and protect limb and body alignment  - Provide patient/ family with appropriate education  Outcome: Progressing     Problem: Nutrition/Hydration-ADULT  Goal: Nutrient/Hydration intake appropriate for improving, restoring or maintaining nutritional needs  Description: Monitor and assess patient's nutrition/hydration status for malnutrition  Collaborate with interdisciplinary team and initiate plan and interventions as ordered  Monitor patient's weight and dietary intake as ordered or per policy  Utilize nutrition screening tool and intervene as necessary  Determine patient's food preferences and provide high-protein, high-caloric foods as appropriate       INTERVENTIONS:  - Monitor oral intake, urinary output, labs, and treatment plans  - Assess nutrition and hydration status and recommend course of action  - Evaluate amount of meals eaten  - Assist patient with eating if necessary   - Allow adequate time for meals  - Recommend/ encourage appropriate diets, oral nutritional supplements, and vitamin/mineral supplements  - Order, calculate, and assess calorie counts as needed  - Recommend, monitor, and adjust tube feedings and TPN/PPN based on assessed needs  - Assess need for intravenous fluids  - Provide specific nutrition/hydration education as appropriate  - Include patient/family/caregiver in decisions related to nutrition  Outcome: Progressing

## 2022-04-11 NOTE — ASSESSMENT & PLAN NOTE
With h/o DVT  On coumadin outpatient (home regimen 2 5 mg daily except Wed/Sat takes 5 mg daily)   Last dose 3/31  · Heparin gtt started on 4/2 per neurosurgery (per my d/w NSx they would like patient on ACS low protocol NOT VTE/High given recent surgery)   · Both MARCIN drains out as 4/8  · Cleared to start Coumadin 4/9  · INR 1 27 today  · Monitor PT/INR   Goal INR 2-3

## 2022-04-11 NOTE — ASSESSMENT & PLAN NOTE
Continue with home dose BB/Flecainide  · Continue heparin drip, 2nd MARCIN drain removed late in day 4/8  Cleared to resume Coumadin 4/9  INR 1 27 4/10, Goal INR between 2 and 3

## 2022-04-11 NOTE — PROGRESS NOTES
Progress Note - Infectious Disease   Kelley Mcguire 71 y o  male MRN: [id number]  Unit/Bed#: [facility] 608-01 Encounter: [id number]      Impression:  1  Postoperative MRSA cervical wound infection R/0 vertebral osteomyelitis S/P posterior cervical a VAC UA shin of postoperative collection and debridement with placement of drains C3-T1 POD 9  2  S/P  anterior cervical diskectomy and fixation fusion C5-6 and C6-7 posterior cervical decompression and instrumented fusion C3-T1 on 22  3  History of PAF on Coumadin  4  Factor 5 Leiden mutation  5  WILL    Recommendations:  Afebrile with normal WBC count   Hemoglobin is 7 6 Wound dressing is dry However erythema  appears darker and more prominent   1  Final cultures are showing MRSA  2  Continue vancomycin IV with further adjustment of dose by pharmacy  3  Would anticipate at least a four-week IV vancomycin course followed by approximately 4 weeks of p o  Rx   4  Uric acid was WNL taken to evaluate right 1st MTP joint pain  Pain is reduced on Mobic          Antibiotics:  1  Vancomycin 1 25 gm q 12 hours IV, day 10 of 28 Rx    Subjective:  Has twinges of left lateral neck discomfort  Painful swelling of his right 1st MTP joint is reduced   Denies fevers, chills, or sweats  Denies nausea, vomiting, or diarrhea  Objective:  Vitals:  Temp:  [97 3 °F (36 3 °C)-99 3 °F (37 4 °C)] 97 3 °F (36 3 °C)  HR:  [55-64] 55  Resp:  [16] 16  BP: (138-141)/(60-69) 138/60  SpO2:  [90 %-95 %] 90 %  Temp (24hrs), Av 3 °F (36 8 °C), Min:97 3 °F (36 3 °C), Max:99 3 °F (37 4 °C)  Current: Temperature: (!) 97 3 °F (36 3 °C)    Physical Exam:     General Appearance:  Alert, nontoxic, no acute distress  Vista collar in place  Posterior cervical wound with former MARCIN drain sites clean and dry   Erythema that surrounds the wound appears more prominent and darker  No point tenderness or discharge  Appears comfortable sitting in bed   Throat: Oropharynx moist without lesions    Lips, mucosa, and tongue normal   Neck: Vista collar with posterior cervical wound with staples and retention sutures and 1 MARCIN drain with modest serosanguineous drainage  Dressing is dry  1+ surrounding erythema as per picture on chart   Lungs:   Clear to auscultation bilaterally, no audible wheezes, rhonchi or rales; respirations unlabored   Heart:  Regular rate and rhythm, S1, S2 normal, no murmur, rub or gallop   Abdomen:   Soft, non-tender, surgical scars non-distended, positive bowel sounds  No masses, no organomegaly    No CVA tenderness   Extremities: Reduced swelling right 1st MTP joint, no clubbing, cyanosis or edema  PICC line right basilic   Skin: As above, surgical scar  Invasive Devices  Report    Peripherally Inserted Central Catheter Line            PICC Line 13/04/75 Right Basilic 8 days                Labs, Imaging, & Other studies:   All pertinent labs were personally reviewed  Results from last 7 days   Lab Units 04/10/22  0501 04/09/22  0632 04/08/22  1724   WBC Thousand/uL 7 41 7 61 7 24   HEMOGLOBIN g/dL 7 6* 7 4* 8 4*  8 5*   PLATELETS Thousands/uL 263 269 330     Results from last 7 days   Lab Units 04/10/22  0501 04/07/22  0515 04/07/22  0515 04/05/22  1359 04/05/22  1359   SODIUM mmol/L 139  --  139  --  139   POTASSIUM mmol/L 3 8   < > 3 6   < > 3 8   CHLORIDE mmol/L 105   < > 106   < > 106   CO2 mmol/L 29   < > 30   < > 31   BUN mg/dL 12   < > 9   < > 10   CREATININE mg/dL 0 88   < > 0 74   < > 0 64   EGFR ml/min/1 73sq m 86   < > 92   < > 98   CALCIUM mg/dL 8 5   < > 8 6   < > 8 9    < > = values in this interval not displayed

## 2022-04-11 NOTE — ASSESSMENT & PLAN NOTE
· Complaints or R great toe pain with warmth on 4/8  · Quick note placed documenting plan for uric acid level, orthopedics consult and mobic 15mg x3 days (completed 4/10)  · Further medication management with NSAIDS and steroids should be avoided  · patient post op from cervical fusion and NSAIDs can inhibit bony fusion formation  Plan to DC after 3 days

## 2022-04-11 NOTE — ASSESSMENT & PLAN NOTE
POD 10 posterior cervical evacuation of postoperative collection and debridement with placement of drains C3-T1  · S/p Anterior cervical discectomy and fixation fusion C5/6 and C6/7; Posterior cervical decompression and instrumented fusion C3-T1 with Dr Ebony Mendoza on 3/11/22  · Presented as a direct admission from the office on 03/31 with concerns for wound infection    Imaging:   · CT Cervical w/ contrast 3/31/2022: Large posterior soft tissue fluid collection, morphology suspicious for infection as clinically suspected  Study is nondiagnostic for evaluation of the central canal and epidural abscess  MR is better suited intracanalicular evaluation although will be limited by artifact from metal hardware  Plan:   Continue to monitor neurological exam and symptoms   Blood Cx no growth after 5 days    Wound cx 3/31 + MRSA   Intraop cx tissue culture 4/1 + MRSA   [place] collar to remain in place at all times, okay for [place] collar for showering   MARCIN drains:  o Left MARCIN removed 4/8/2022  o Right MARCIN removed 4/6/2022  · SLIM and ID consulted - appreciate recommendations  · Patient will require 4 weeks of IV vancomycin followed by 4 weeks of p o  antibiotics  · PICC in place  · Vancomycin 1,5000 mg q12h  · Continue to monitor incision - photo in chart under media  · Pain well controlled on current pain regimen:  · Gabapentin 100mg daily and 300mg at HS  · Tylenol 975 mg TID prn  · Robaxin 750 mg q 6 hours p r n   · Oxycodone 2 5-5mg q 4 hours p r n  For moderate or severe pain  · Bowel regimen: Senokot, and MiraLax  · Mobilize with PT/OT  Home with outpatient rehab  · Mobilize as tolerated  · DVT ppx: SCDs, on heparin gtt  · Patient started on coumadin   · Plan of care discussed with MIREILLE Alfaro  Neurosurgery will follow as primary, call with any further questions or concerns

## 2022-04-12 LAB
APTT PPP: 76 SECONDS (ref 23–37)
INR PPP: 1.75 (ref 0.84–1.19)
PROTHROMBIN TIME: 19.6 SECONDS (ref 11.6–14.5)
VANCOMYCIN TROUGH SERPL-MCNC: 23.1 UG/ML (ref 10–20)

## 2022-04-12 PROCEDURE — 85610 PROTHROMBIN TIME: CPT | Performed by: INTERNAL MEDICINE

## 2022-04-12 PROCEDURE — 99024 POSTOP FOLLOW-UP VISIT: CPT | Performed by: NURSE PRACTITIONER

## 2022-04-12 PROCEDURE — 80202 ASSAY OF VANCOMYCIN: CPT | Performed by: INTERNAL MEDICINE

## 2022-04-12 PROCEDURE — 99232 SBSQ HOSP IP/OBS MODERATE 35: CPT | Performed by: INTERNAL MEDICINE

## 2022-04-12 PROCEDURE — 85730 THROMBOPLASTIN TIME PARTIAL: CPT | Performed by: NEUROLOGICAL SURGERY

## 2022-04-12 PROCEDURE — 99232 SBSQ HOSP IP/OBS MODERATE 35: CPT | Performed by: NURSE PRACTITIONER

## 2022-04-12 PROCEDURE — 94760 N-INVAS EAR/PLS OXIMETRY 1: CPT

## 2022-04-12 RX ORDER — VANCOMYCIN HYDROCHLORIDE 1 G/200ML
1000 INJECTION, SOLUTION INTRAVENOUS EVERY 12 HOURS
Status: DISCONTINUED | OUTPATIENT
Start: 2022-04-12 | End: 2022-04-12 | Stop reason: SDUPTHER

## 2022-04-12 RX ORDER — WARFARIN SODIUM 2.5 MG/1
2.5 TABLET ORAL
Status: DISCONTINUED | OUTPATIENT
Start: 2022-04-12 | End: 2022-04-13

## 2022-04-12 RX ORDER — WARFARIN SODIUM 5 MG/1
TABLET ORAL
COMMUNITY

## 2022-04-12 RX ADMIN — WARFARIN SODIUM 2.5 MG: 2.5 TABLET ORAL at 17:47

## 2022-04-12 RX ADMIN — DULOXETINE HYDROCHLORIDE 60 MG: 60 CAPSULE, DELAYED RELEASE ORAL at 09:21

## 2022-04-12 RX ADMIN — METHOCARBAMOL TABLETS 750 MG: 750 TABLET, COATED ORAL at 17:47

## 2022-04-12 RX ADMIN — GABAPENTIN 100 MG: 100 CAPSULE ORAL at 09:21

## 2022-04-12 RX ADMIN — HEPARIN SODIUM 16.1 UNITS/KG/HR: 10000 INJECTION, SOLUTION INTRAVENOUS; SUBCUTANEOUS at 12:00

## 2022-04-12 RX ADMIN — GABAPENTIN 300 MG: 300 CAPSULE ORAL at 22:01

## 2022-04-12 RX ADMIN — FLECAINIDE ACETATE 100 MG: 100 TABLET ORAL at 09:22

## 2022-04-12 RX ADMIN — POTASSIUM CHLORIDE 40 MEQ: 20 TABLET, EXTENDED RELEASE ORAL at 09:21

## 2022-04-12 RX ADMIN — TAMSULOSIN HYDROCHLORIDE 0.4 MG: 0.4 CAPSULE ORAL at 17:47

## 2022-04-12 RX ADMIN — METOPROLOL SUCCINATE 100 MG: 100 TABLET, EXTENDED RELEASE ORAL at 09:21

## 2022-04-12 RX ADMIN — FLECAINIDE ACETATE 100 MG: 100 TABLET ORAL at 17:47

## 2022-04-12 RX ADMIN — AMLODIPINE BESYLATE 5 MG: 5 TABLET ORAL at 09:22

## 2022-04-12 RX ADMIN — PRAVASTATIN SODIUM 40 MG: 40 TABLET ORAL at 09:21

## 2022-04-12 RX ADMIN — ACETAMINOPHEN 975 MG: 325 TABLET ORAL at 17:47

## 2022-04-12 RX ADMIN — SENNOSIDES 8.6 MG: 8.6 TABLET, FILM COATED ORAL at 09:21

## 2022-04-12 RX ADMIN — FLUTICASONE PROPIONATE 1 SPRAY: 50 SPRAY, METERED NASAL at 09:22

## 2022-04-12 RX ADMIN — VANCOMYCIN HYDROCHLORIDE 1250 MG: 10 INJECTION, POWDER, LYOPHILIZED, FOR SOLUTION INTRAVENOUS at 00:07

## 2022-04-12 RX ADMIN — VANCOMYCIN HYDROCHLORIDE 1250 MG: 10 INJECTION, POWDER, LYOPHILIZED, FOR SOLUTION INTRAVENOUS at 11:36

## 2022-04-12 NOTE — PROGRESS NOTES
[address] [facility]  Progress Note - Kavita Magdaleno 1950, 71 y o  male MRN: [id number]  Unit/Bed#: [facility] 852-83 Encounter: [id number]  Primary Care Provider: Agustin Kidd MD   Date and time admitted to hospital: 3/31/2022 10:58 AM    * Surgical site infection  Assessment & Plan  Hx of cervical myelopathy, s/p anterior cervical discectomy and fixation fusion C5/6 and C6/7; Posterior cervical decompression and instrumented fusion C3-T1 with Dr Soledad Siegel on 3/11/22  Discharged to [facility] for rehab on 3/18, then discharged home on 3/29  Noted to have increased redness and drainage at incision site, no improvement after 6 days of Keflex  Referred to ED from OP office visit for further management  · CT cervical spine: Large posterior soft tissue fluid collection, morphology suspicious for infection as clinically suspected  Study is nondiagnostic for evaluation of the central canal and epidural abscess  No specific findings of osteomyelitis  · Admitted under neurosurgery service  · Status post operative washout on 4/1   · ID following and managing antibiotics  · Wound cultures and OR cultures are growing MRSA   · Currently on vancomycin, will need 4 weeks IV antibiotics followed by similar course of p o  Antibiotics, PICC line in place  · Blood cultures - no growth after 5 days    · Analgesics as needed   · PT/OT recommending outpatient rehabilitation once medically stable  Case management aware of need for VNA for home intravenous antibiotics  Great toe pain, right  Assessment & Plan  Presumed gout right 1st MRP joint  Completed Mobic x 3 days  Uric acid level WNL  Seen by ortho, no intervention   · 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  · Symptomatically improved   · Avoid steroids given infection      Factor V Leiden mutation [facility])  Assessment & Plan  With h/o DVT  On coumadin outpatient (home regimen 2 5 mg daily except Wed/Sat takes 5 mg daily)    Last dose 3/31  · Heparin gtt started on 4/2 per neurosurgery (per my d/w NSx they would like patient on ACS low protocol NOT VTE/High given recent surgery)   · Both MARCIN drains out as 4/8  · Cleared to start Coumadin 4/9  · INR 1 75   · Monitor PT/INR  Goal INR 2-3    Paroxysmal A-fib (HCC)  Assessment & Plan  Continue with home dose BB/Flecainide  · Continue heparin drip, 2nd MARCIN drain removed late in day 4/8  Cleared to resume Coumadin 4/9  INR 1 75 4/12, Goal INR between 2 and 3  Cervical myelopathy (HCC)  Assessment & Plan  Status post cervical spinal fusion on 3/11/22 as above   · See related plan     Anemia  Assessment & Plan  Appears to be postop anemia since recent surgery in March, hemoglobin between 9-10 since that time and previously within normal limits   · Hemoglobin was down-trending since operative washout, no signs or symptoms of active bleeding   · folate and B12 WNL  · Low Fe level however elevated ferritin  · Hemoglobin has been fluctuating but overall stable  Continue monitor and transfuse as needed  Mixed hyperlipidemia  Assessment & Plan  · Continue statin     Essential hypertension  Assessment & Plan  BP acceptable, monitor routinely   · Continue home dose Norvasc, metoprolol     WILL (obstructive sleep apnea)  Assessment & Plan  · CPAP qhs       VTE Pharmacologic Prophylaxis: VTE Score: 10 Moderate Risk (Score 3-4) - Pharmacological DVT Prophylaxis Ordered: heparin drip  Patient Centered Rounds: I performed bedside rounds with nursing staff today  Discussions with Specialists or Other Care Team Provider: nursing, case management, neurosurgery     Education and Discussions with Family / Patient: patient  Time Spent for Care: 30 minutes  More than 50% of total time spent on counseling and coordination of care as described above      Current Length of Stay: 12 day(s)    Current Patient Status: Inpatient     Certification Statement: The patient will continue to require additional inpatient hospital stay due to pending therapuetic INR, otherwise rest of plan per primary team     Discharge Plan: Anticipate discharge in 24-48 hrs to home with home services  Code Status: Level 1 - Full Code    Subjective:   Patient offers no acute complaints  Foot pain has almost completely resolved  Hoping for discharge within next 1-2 days  Objective:     Vitals:   Temp (24hrs), Av °F (36 7 °C), Min:97 7 °F (36 5 °C), Max:98 4 °F (36 9 °C)    Temp:  [97 7 °F (36 5 °C)-98 4 °F (36 9 °C)] 97 7 °F (36 5 °C)  HR:  [59-62] 61  Resp:  [14-18] 14  BP: (139-145)/(65-66) 145/65  SpO2:  [94 %] 94 %  Body mass index is 32 78 kg/m²  Input and Output Summary (last 24 hours): Intake/Output Summary (Last 24 hours) at 2022 1012  Last data filed at 2022 0813  Gross per 24 hour   Intake 1020 91 ml   Output 925 ml   Net 95 91 ml       Physical Exam:   Physical Exam  Vitals and nursing note reviewed  Constitutional:       Appearance: He is obese  Neck:      Comments: Cervical collar in place  Cardiovascular:      Rate and Rhythm: Normal rate  Pulmonary:      Breath sounds: Normal breath sounds  Abdominal:      Tenderness: There is no abdominal tenderness  Musculoskeletal:         General: No swelling  Skin:     General: Skin is warm  Neurological:      Mental Status: He is alert and oriented to person, place, and time  Mental status is at baseline     Psychiatric:         Mood and Affect: Mood normal           Additional Data:     Labs:  Results from last 7 days   Lab Units 22  0440   WBC Thousand/uL 6 96   HEMOGLOBIN g/dL 7 7*   HEMATOCRIT % 23 8*   PLATELETS Thousands/uL 276   NEUTROS PCT % 60   LYMPHS PCT % 25   MONOS PCT % 11   EOS PCT % 2     Results from last 7 days   Lab Units 04/10/22  0501   SODIUM mmol/L 139   POTASSIUM mmol/L 3 8   CHLORIDE mmol/L 105   CO2 mmol/L 29   BUN mg/dL 12   CREATININE mg/dL 0 88   ANION GAP mmol/L 5   CALCIUM mg/dL 8 5   GLUCOSE RANDOM mg/dL 107     Results from last 7 days   Lab Units 04/12/22  0633   INR  1 75*                   Lines/Drains:  Invasive Devices  Report    Peripherally Inserted Central Catheter Line            PICC Line 02/30/08 Right Basilic 9 days                Central Line:  Goal for removal: N/A - Discharging with PICC for IV ABX/medications             Imaging: No pertinent imaging reviewed      Recent Cultures (last 7 days):         Last 24 Hours Medication List:   Current Facility-Administered Medications   Medication Dose Route Frequency Provider Last Rate    acetaminophen  975 mg Oral TID PRN Alison Biundo, PA-C      amLODIPine  5 mg Oral Daily Alison Biundo, PA-C      DULoxetine  60 mg Oral Daily Alison Biundo, PA-C      flecainide  100 mg Oral BID Alison Biundo, PA-C      fluticasone  1 spray Each Nare Daily DEEPTHI Wisdom      gabapentin  100 mg Oral Daily Alison Biundo, PA-ALTAGRACIA      gabapentin  300 mg Oral HS Alisno Biundo, PA-C      heparin (porcine)  3-20 Units/kg/hr (Order-Specific) Intravenous Titrated Shahid Frye PA-C 16 1 Units/kg/hr (04/12/22 0359)    methocarbamol  750 mg Oral Q6H PRN DEEPTHI Sanders      metoprolol succinate  100 mg Oral Daily Alison Biundo, PA-C      ondansetron  4 mg Intravenous Q6H PRN Alison Biundo, PA-C      oxyCODONE  2 5 mg Oral Q4H PRN Alison Biundo, PA-C      oxyCODONE  5 mg Oral Q4H PRN Alison Biundo, PA-C      polyethylene glycol  17 g Oral Daily PRN DEEPTHI Tellez      potassium chloride  40 mEq Oral Daily Alison Biundo, PA-C      pravastatin  40 mg Oral Daily Alison Biundo, PA-C      senna  8 6 mg Oral Daily Alison Biundo, PA-C      tamsulosin  0 4 mg Oral Daily With FABIOLA Pradhan      vancomycin  1,250 mg Intravenous Q12H Vanessa Kendall PA-C Stopped (04/12/22 0130)    warfarin  2 5 mg Oral Daily (warfarin) DEEPTHI Thomas          Today, Patient Was Seen By: DEEPTHI Villalobos    **Please Note: This note may have been constructed using a voice recognition system  **

## 2022-04-12 NOTE — ASSESSMENT & PLAN NOTE
· On heparin gtt, PTT this am was 76  · No active signs of bleeding, continue to monitor incision  · Started on coumadin 5mg daily   · INR 1 75  · Management per SLIM  Continue transition to oral AC at this time

## 2022-04-12 NOTE — ASSESSMENT & PLAN NOTE
With h/o DVT  On coumadin outpatient (home regimen 2 5 mg daily except Wed/Sat takes 5 mg daily)   Last dose 3/31  · Heparin gtt started on 4/2 per neurosurgery (per my d/w NSx they would like patient on ACS low protocol NOT VTE/High given recent surgery)   · Both MARCIN drains out as 4/8  · Cleared to start Coumadin 4/9  · INR 1 75   · Monitor PT/INR   Goal INR 2-3

## 2022-04-12 NOTE — RESTORATIVE TECHNICIAN NOTE
Restorative Technician Note      Patient Name: Luiz Stiles     Restorative Tech Visit Date: 04/12/22  Note Type: Mobility  Patient Position Upon Consult: Bedside chair  Activity Performed: Ambulated  Assistive Device: Roller walker  Patient Position at End of Consult: Bedside chair;  All needs within reach

## 2022-04-12 NOTE — CASE MANAGEMENT
Case Management Discharge Planning Note    Patient name Juan Manuel Reyes  Location [facility] 260-41 MRN [id number]  : 1950 Date 2022       Current Admission Date: 3/31/2022  Current Admission Diagnosis:Surgical site infection   Patient Active Problem List    Diagnosis Date Noted    Great toe pain, right 04/10/2022    Surgical site infection 2022    Status post cervical spinal fusion 2022    Anemia 2022    Head pain cephalgia 2022    Hyponatremia 2022    Muscle spasm 2022    Goals of care, counseling/discussion 2022    Sinus bradycardia 03/10/2022    Cervical myelopathy ([facility] ) 2022    Ambulatory dysfunction 2022    Weakness 2022    Pes anserinus bursitis of right knee 2021    IFG (impaired fasting glucose) 2021    History of DVT of lower extremity 2021    Mixed hyperlipidemia 10/27/2020    Paroxysmal A-fib ([address] ) 2020    Lower extremity edema 2020    Primary osteoarthritis of left knee 2020    Primary osteoarthritis of right knee 2020    Depression, major, single episode, moderate ([facility] ) 10/30/2017    Insomnia 03/10/2017    Lumbar herniated disc 03/10/2017    Erectile dysfunction 2016    Status post catheter ablation of atrial flutter 2015    Essential hypertension 2015    WILL (obstructive sleep apnea) 2015    Factor V Leiden mutation ([facility] ) 2014      LOS (days): 12  Geometric Mean LOS (GMLOS) (days): 5 40  Days to GMLOS:-6 6     OBJECTIVE:  Risk of Unplanned Readmission Score: 25         Current admission status: Inpatient   Preferred Pharmacy:   [address] [facility] [address]  [address]  [address] [address]  Phone: [phone] Fax: [phone]    [facility]/pharmacy #[facility] [facility] [address]  Phone: [phone] Fax: [phone]    Primary Care Provider: Alexandra Sawyer MD    Primary Insurance: [facility]  Secondary Insurance:     DISCHARGE DETAILS:    Discharge planning discussed with[de-identified] Patient  Freedom of Choice: Yes  Comments - Freedom of Choice: Discussed FOC  CM contacted family/caregiver?: Yes  Were Treatment Team discharge recommendations reviewed with patient/caregiver?: Yes  Did patient/caregiver verbalize understanding of patient care needs?: Yes  Were patient/caregiver advised of the risks associated with not following Treatment Team discharge recommendations?: Yes    Contacts  Patient Contacts: Janet Monroy  Relationship to Patient[de-identified] Family  Contact Method: Phone  Phone Number: [phone]  Reason/Outcome: Discharge Planning,Continuity of Care,Emergency Contact    Other Referral/Resources/Interventions Provided:  Interventions: HHC,Home Infusion  Referral Comments: Patient agreeable to referral being placed to [facility] for [facility], referral entered in [address], script uploaded to [address] [place] [address] [facility]

## 2022-04-12 NOTE — PLAN OF CARE
Problem: PAIN - ADULT  Goal: Verbalizes/displays adequate comfort level or baseline comfort level  Description: Interventions:  - Encourage patient to monitor pain and request assistance  - Assess pain using appropriate pain scale  - Administer analgesics based on type and severity of pain and evaluate response  - Implement non-pharmacological measures as appropriate and evaluate response  - Consider cultural and social influences on pain and pain management  - Notify physician/advanced practitioner if interventions unsuccessful or patient reports new pain  Outcome: Progressing     Problem: INFECTION - ADULT  Goal: Absence or prevention of progression during hospitalization  Description: INTERVENTIONS:  - Assess and monitor for signs and symptoms of infection  - Monitor lab/diagnostic results  - Monitor all insertion sites, i e  indwelling lines, tubes, and drains  - Monitor endotracheal if appropriate and nasal secretions for changes in amount and color  - [place] appropriate cooling/warming therapies per order  - Administer medications as ordered  - Instruct and encourage patient and family to use good hand hygiene technique  - Identify and instruct in appropriate isolation precautions for identified infection/condition  Outcome: Progressing  Goal: Absence of fever/infection during neutropenic period  Description: INTERVENTIONS:  - Monitor WBC    Outcome: Progressing     Problem: SAFETY ADULT  Goal: Patient will remain free of falls  Description: INTERVENTIONS:  - Educate patient/family on patient safety including physical limitations  - Instruct patient to call for assistance with activity   - Consult OT/PT to assist with strengthening/mobility   - Keep Call bell within reach  - Keep bed low and locked with side rails adjusted as appropriate  - Keep care items and personal belongings within reach  - Initiate and maintain comfort rounds  - Make Fall Risk Sign visible to staff  - Offer Toileting   - Obtain necessary fall risk management equipment  - Apply yellow socks and bracelet for high fall risk patients  - Consider moving patient to room near nurses station  Outcome: Progressing  Goal: Maintain or return to baseline ADL function  Description: INTERVENTIONS:  -  Assess patient's ability to carry out ADLs; assess patient's baseline for ADL function and identify physical deficits which impact ability to perform ADLs (bathing, care of mouth/teeth, toileting, grooming, dressing, etc )  - Assess/evaluate cause of self-care deficits   - Assess range of motion  - Assess patient's mobility; develop plan if impaired  - Assess patient's need for assistive devices and provide as appropriate  - Encourage maximum independence but intervene and supervise when necessary  - Involve family in performance of ADLs  - Assess for home care needs following discharge   - Consider OT consult to assist with ADL evaluation and planning for discharge  - Provide patient education as appropriate  Outcome: Progressing  Goal: Maintains/Returns to pre admission functional level  Description: INTERVENTIONS:  - Perform BMAT or MOVE assessment daily    - Set and communicate daily mobility goal to care team and patient/family/caregiver     - Collaborate with rehabilitation services on mobility goals if consulted  - Out of bed for toileting  - Record patient progress and toleration of activity level   Outcome: Progressing     Problem: DISCHARGE PLANNING  Goal: Discharge to home or other facility with appropriate resources  Description: INTERVENTIONS:  - Identify barriers to discharge w/patient and caregiver  - Arrange for needed discharge resources and transportation as appropriate  - Identify discharge learning needs (meds, wound care, etc )  - Arrange for interpretive services to assist at discharge as needed  - Refer to Case Management Department for coordinating discharge planning if the patient needs post-hospital services based on physician/advanced practitioner order or complex needs related to functional status, cognitive ability, or social support system  Outcome: Progressing     Problem: Knowledge Deficit  Goal: Patient/family/caregiver demonstrates understanding of disease process, treatment plan, medications, and discharge instructions  Description: Complete learning assessment and assess knowledge base    Interventions:  - Provide teaching at level of understanding  - Provide teaching via preferred learning methods  Outcome: Progressing     Problem: Potential for Falls  Goal: Patient will remain free of falls  Description: INTERVENTIONS:  - Educate patient/family on patient safety including physical limitations  - Instruct patient to call for assistance with activity   - Consult OT/PT to assist with strengthening/mobility   - Keep Call bell within reach  - Keep bed low and locked with side rails adjusted as appropriate  - Keep care items and personal belongings within reach  - Initiate and maintain comfort rounds  - Make Fall Risk Sign visible to staff  - Offer [address] necessary fall risk management equipment  - Apply yellow socks and bracelet for high fall risk patients  - Consider moving patient to room near nurses station  Outcome: Progressing     Problem: MOBILITY - ADULT  Goal: Maintain or return to baseline ADL function  Description: INTERVENTIONS:  -  Assess patient's ability to carry out ADLs; assess patient's baseline for ADL function and identify physical deficits which impact ability to perform ADLs (bathing, care of mouth/teeth, toileting, grooming, dressing, etc )  - Assess/evaluate cause of self-care deficits   - Assess range of motion  - Assess patient's mobility; develop plan if impaired  - Assess patient's need for assistive devices and provide as appropriate  - Encourage maximum independence but intervene and supervise when necessary  - Involve family in performance of ADLs  - Assess for home care needs following discharge   - Consider OT consult to assist with ADL evaluation and planning for discharge  - Provide patient education as appropriate  Outcome: Progressing  Goal: Maintains/Returns to pre admission functional level  Description: INTERVENTIONS:  - Perform BMAT or MOVE assessment daily    - Set and communicate daily mobility goal to care team and patient/family/caregiver     - Collaborate with rehabilitation services on mobility goals if consulted  - Out of bed for toileting  - Record patient progress and toleration of activity level   Outcome: Progressing     Problem: Prexisting or High Potential for Compromised Skin Integrity  Goal: Skin integrity is maintained or improved  Description: INTERVENTIONS:  - Identify patients at risk for skin breakdown  - Assess and monitor skin integrity  - Assess and monitor nutrition and hydration status  - Monitor labs   - Assess for incontinence   - Turn and reposition patient  - Assist with mobility/ambulation  - Relieve pressure over bony prominences  - Avoid friction and shearing  - Provide appropriate hygiene as needed including keeping skin clean and dry  - Evaluate need for skin moisturizer/barrier cream  - Collaborate with interdisciplinary team   - Patient/family teaching  - Consider wound care consult   Outcome: Progressing     Problem: MUSCULOSKELETAL - ADULT  Goal: Maintain or return mobility to safest level of function  Description: INTERVENTIONS:  - Assess patient's ability to carry out ADLs; assess patient's baseline for ADL function and identify physical deficits which impact ability to perform ADLs (bathing, care of mouth/teeth, toileting, grooming, dressing, etc )  - Assess/evaluate cause of self-care deficits   - Assess range of motion  - Assess patient's mobility  - Assess patient's need for assistive devices and provide as appropriate  - Encourage maximum independence but intervene and supervise when necessary  - Involve family in performance of ADLs  - Assess for home care needs following discharge   - Consider OT consult to assist with ADL evaluation and planning for discharge  - Provide patient education as appropriate  Outcome: Progressing  Goal: Maintain proper alignment of affected body part  Description: INTERVENTIONS:  - Support, maintain and protect limb and body alignment  - Provide patient/ family with appropriate education  Outcome: Progressing     Problem: Nutrition/Hydration-ADULT  Goal: Nutrient/Hydration intake appropriate for improving, restoring or maintaining nutritional needs  Description: Monitor and assess patient's nutrition/hydration status for malnutrition  Collaborate with interdisciplinary team and initiate plan and interventions as ordered  Monitor patient's weight and dietary intake as ordered or per policy  Utilize nutrition screening tool and intervene as necessary  Determine patient's food preferences and provide high-protein, high-caloric foods as appropriate       INTERVENTIONS:  - Monitor oral intake, urinary output, labs, and treatment plans  - Assess nutrition and hydration status and recommend course of action  - Evaluate amount of meals eaten  - Assist patient with eating if necessary   - Allow adequate time for meals  - Recommend/ encourage appropriate diets, oral nutritional supplements, and vitamin/mineral supplements  - Order, calculate, and assess calorie counts as needed  - Recommend, monitor, and adjust tube feedings and TPN/PPN based on assessed needs  - Assess need for intravenous fluids  - Provide specific nutrition/hydration education as appropriate  - Include patient/family/caregiver in decisions related to nutrition  Outcome: Progressing

## 2022-04-12 NOTE — ASSESSMENT & PLAN NOTE
POD 11 posterior cervical evacuation of postoperative collection and debridement with placement of drains C3-T1  · S/p Anterior cervical discectomy and fixation fusion C5/6 and C6/7; Posterior cervical decompression and instrumented fusion C3-T1 with Dr Jose Luis Joshua on 3/11/22  · Presented as a direct admission from the office on 03/31 with concerns for wound infection    Imaging:   · CT Cervical w/ contrast 3/31/2022: Large posterior soft tissue fluid collection, morphology suspicious for infection as clinically suspected  Study is nondiagnostic for evaluation of the central canal and epidural abscess  MR is better suited intracanalicular evaluation although will be limited by artifact from metal hardware  Plan:   Continue to monitor neurological exam and symptoms   Blood Cx no growth after 5 days    Wound cx 3/31 + MRSA   Intraop cx tissue culture 4/1 + MRSA   [place] collar to remain in place at all times, okay for [place] collar for showering   MARCIN drains:  o Left MARCIN removed 4/8/2022  o Right MARCIN removed 4/6/2022  · SLIM and ID consulted - appreciate recommendations  · Patient will require 4 weeks of IV vancomycin followed by 4 weeks of p o  antibiotics  · PICC in place  · Vancomycin 1,5000 mg q12h  · Continue to monitor incision - photo in chart under media  · Pain well controlled on current pain regimen:  · Gabapentin 100mg daily and 300mg at HS  · Tylenol 975 mg TID prn  · Robaxin 750 mg q 6 hours p r n   · Oxycodone 2 5-5mg q 4 hours p r n  For moderate or severe pain  · Bowel regimen: Senokot, and MiraLax  · Mobilize with PT/OT  Home with outpatient rehab  · Mobilize as tolerated  · DVT ppx: SCDs, on heparin gtt  · Patient started on coumadin   · Plan of care discussed with MIREILLE Perez  Neurosurgery will follow as primary, call with any further questions or concerns

## 2022-04-12 NOTE — PROGRESS NOTES
Vancomycin IV Pharmacy-to-Dose Consultation    Sarmad Adjutant is a 71 y o  male who is currently receiving Vancomycin IV with management by the Pharmacy Consult service  Relevant clinical data and objective history reviewed:  Temp Readings from Last 3 Encounters:   04/12/22 97 7 °F (36 5 °C)   03/31/22 (!) 96 4 °F (35 8 °C) (Tympanic)   03/29/22 97 7 °F (36 5 °C) (Oral)     /65   Pulse 61   Temp 97 7 °F (36 5 °C)   Resp 14   Ht 5' 11" (1 803 m)   Wt 107 kg (235 lb)   SpO2 94%   BMI 32 78 kg/m²     I/O last 3 completed shifts: In: 1238 3 [P O :450; I V :538 3; IV Piggyback:250]  Out: 975 [Urine:975]    Lab Results   Component Value Date/Time    BUN 12 04/10/2022 05:01 AM    BUN 17 11/02/2021 01:02 PM    WBC 6 96 04/11/2022 04:40 AM    WBC 8 43 01/23/2015 04:40 AM    HGB 7 7 (L) 04/11/2022 04:40 AM    HGB 12 9 01/23/2015 04:40 AM    HCT 23 8 (L) 04/11/2022 04:40 AM    HCT 39 5 01/23/2015 04:40 AM    MCV 93 04/11/2022 04:40 AM    MCV 93 01/23/2015 04:40 AM     04/11/2022 04:40 AM     01/23/2015 04:40 AM     Creatinine   Date Value Ref Range Status   04/10/2022 0 88 0 60 - 1 30 mg/dL Final     Comment:     Standardized to IDMS reference method   04/07/2022 0 74 0 60 - 1 30 mg/dL Final     Comment:     Standardized to IDMS reference method   10/25/2017 1 24 0 76 - 1 27 mg/dL Final   11/03/2016 1 11 0 76 - 1 27 mg/dL Final     Vancomycin Tr   Date Value Ref Range Status   04/12/2022 23 1 (HH) 10 0 - 20 0 ug/mL Final   04/09/2022 19 6 10 0 - 20 0 ug/mL Final         Vancomycin Assessment:  Indication: osteomyelitis bone/joint  Renal Function: Scr 0 88;  CrCl ~ 90 ml/min  Days of Therapy: 12  Current Dose: 1250 mg q12h   Goal Trough: 15-20 (appropriate for most indications)   Goal AUC(24h): 400-600  Last Level: 23 1      Vancomycin Plan:  New Dosing: change to 1000 mg q12h  Predicted Trough / AUC: 17 9 / 579  Next Level: Trough 4/14 @ 1100      Pharmacy will continue to follow closely for s/sx of nephrotoxicity, infusion reactions and appropriateness of therapy  BMP and CBC will be ordered per protocol  We will continue to follow the patient's culture results and clinical progress daily         Carolyne Hollis, PharmD  Pharmacist

## 2022-04-12 NOTE — PROGRESS NOTES
[facility]  Progress Note - Jef Mitchell 1950, 71 y o  male MRN: [id number]  Unit/Bed#: [facility] 574-77 Encounter: [id number]  Primary Care Provider: Janee Barrera MD   Date and time admitted to hospital: 3/31/2022 10:58 AM    Great toe pain, right  Assessment & Plan  · Complaints or R great toe pain with warmth on 4/8  · Quick note placed documenting plan for uric acid level, orthopedics consult and mobic 15mg x3 days (completed 4/10)  · Further medication management with NSAIDS and steroids should be avoided  · patient post op from cervical fusion and NSAIDs can inhibit bony fusion formation  Plan to DC after 3 days  Cervical myelopathy (HCC)  Assessment & Plan  POD 11 posterior cervical evacuation of postoperative collection and debridement with placement of drains C3-T1  · S/p Anterior cervical discectomy and fixation fusion C5/6 and C6/7; Posterior cervical decompression and instrumented fusion C3-T1 with Dr Richad Lesches on 3/11/22  · Presented as a direct admission from the office on 03/31 with concerns for wound infection    Imaging:   · CT Cervical w/ contrast 3/31/2022: Large posterior soft tissue fluid collection, morphology suspicious for infection as clinically suspected  Study is nondiagnostic for evaluation of the central canal and epidural abscess  MR is better suited intracanalicular evaluation although will be limited by artifact from metal hardware  Plan:   Continue to monitor neurological exam and symptoms   Blood Cx no growth after 5 days    Wound cx 3/31 + MRSA   Intraop cx tissue culture 4/1 + MRSA   [place] collar to remain in place at all times, okay for [place] collar for showering   MARCIN drains:  o Left MARCIN removed 4/8/2022  o Right MARCIN removed 4/6/2022  · SLIM and ID consulted - appreciate recommendations    · Patient will require 4 weeks of IV vancomycin followed by 4 weeks of p o  antibiotics  · PICC in place  · Vancomycin 1,5000 mg q12h  · Continue to monitor incision - photo in chart under media  · Pain well controlled on current pain regimen:  · Gabapentin 100mg daily and 300mg at HS  · Tylenol 975 mg TID prn  · Robaxin 750 mg q 6 hours p r n   · Oxycodone 2 5-5mg q 4 hours p r n  For moderate or severe pain  · Bowel regimen: Senokot, and MiraLax  · Mobilize with PT/OT  Home with outpatient rehab  · Mobilize as tolerated  · DVT ppx: SCDs, on heparin gtt  · Patient started on coumadin   · Plan of care discussed with MIREILLE Montero  Neurosurgery will follow as primary, call with any further questions or concerns  Factor V Leiden mutation ([address] )  Assessment & Plan  · On heparin gtt, PTT this am was 76  · No active signs of bleeding, continue to monitor incision  · Started on coumadin 5mg daily   · INR 1 75  · Management per SLIM  Continue transition to oral AC at this time  Subjective/Objective   Chief Complaint:  I am doing okay      Subjective:  Denies neck pain  Denies any new numbness or weakness  States he feels his incision is slightly more red  Requesting to ambulate in hallways today  Objective:  NAD  Photo of incision under media tab  I/O       04/10 0701  04/11 0700 04/11 0701  04/12 0700 04/12 0701  04/13 0700    P  O  480 450     I V  (mL/kg) 610 6 (5 7) 320 9 (3)     IV Piggyback 250 250     Total Intake(mL/kg) 1340 6 (12 5) 1020 9 (9 5)     Urine (mL/kg/hr) 300 (0 1) 675 (0 3) 250 (0 8)    Total Output 300 675 250    Net +1040 6 +345 9 -250           Unmeasured Urine Occurrence 2 x      Unmeasured Stool Occurrence 1 x            Invasive Devices  Report    Peripherally Inserted Central Catheter Line            PICC Line 68/63/29 Right Basilic 9 days                Physical Exam:  Vitals: Blood pressure 145/65, pulse 61, temperature 97 7 °F (36 5 °C), resp  rate 14, height 5' 11" (1 803 m), weight 107 kg (235 lb), SpO2 94 %  ,Body mass index is 32 78 kg/m²          General appearance: alert, appears stated age, cooperative and no distress  Head: Normocephalic, without obvious abnormality, atraumatic  Eyes: EOMI, PERRL  Neck:  [place] collar in place, posterior cervical incision clean and dry as the surrounding erythema that is improving  Back: no kyphosis present, no tenderness to percussion or palpation  Lungs: non labored breathing  Heart: regular heart rate  Neurologic:   Mental status: Alert, oriented x3, thought content appropriate  Cranial nerves: grossly intact (Cranial nerves II-XII)  Sensory: normal to light touch  Motor:  Right upper extremity weakness post 4/5  Reflexes: 2+ and symmetric, no Jiménez's or clonus        Lab Results:  Results from last 7 days   Lab Units 04/11/22  0440 04/10/22  0501 04/09/22  0632   WBC Thousand/uL 6 96 7 41 7 61   HEMOGLOBIN g/dL 7 7* 7 6* 7 4*   HEMATOCRIT % 23 8* 23 8* 22 9*   PLATELETS Thousands/uL 276 263 269   NEUTROS PCT % 60 67 68   MONOS PCT % 11 8 8     Results from last 7 days   Lab Units 04/10/22  0501 04/07/22  0515 04/05/22  1359   POTASSIUM mmol/L 3 8 3 6 3 8   CHLORIDE mmol/L 105 106 106   CO2 mmol/L 29 30 31   BUN mg/dL 12 9 10   CREATININE mg/dL 0 88 0 74 0 64   CALCIUM mg/dL 8 5 8 6 8 9             Results from last 7 days   Lab Units 04/12/22  0633 04/12/22  0236 04/11/22 2024 04/11/22  1151 04/11/22  0440 04/11/22  0235 04/10/22  1323 04/10/22  0613   INR  1 75*  --   --   --  1 27*  --   --  1 17   PTT seconds  --  76* 67* 46*  --    < >   < > 55*    < > = values in this interval not displayed  No results found for: TROPONINT  ABG:No results found for: PHART, EQD5NCK, PO2ART, MXL7QXT, C1JXPIYR, BEART, SOURCE    Imaging Studies: I have personally reviewed pertinent reports  and I have personally reviewed pertinent films in PACS    CT spine cervical w contrast    Result Date: 3/31/2022  Impression: Large posterior soft tissue fluid collection, morphology suspicious for infection as clinically suspected   Study is nondiagnostic for evaluation of the central canal and epidural abscess  MR is better suited intracanalicular evaluation although will be limited by artifact from metal hardware  No specific findings of osteomyelitis  Workstation performed: [id number]       EKG, Pathology, and Other Studies: I have personally reviewed pertinent reports        VTE Pharmacologic Prophylaxis: Sequential compression device (Venodyne)  and Heparin    VTE Mechanical Prophylaxis: sequential compression device

## 2022-04-12 NOTE — ASSESSMENT & PLAN NOTE
Presumed gout right 1st MRP joint  Completed Mobic x 3 days  Uric acid level WNL   Seen by ortho, no intervention   · 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  · Symptomatically improved   · Avoid steroids given infection

## 2022-04-12 NOTE — PROGRESS NOTES
Progress Note - Infectious Disease   Mohit Patino 71 y o  male MRN: [id number]  Unit/Bed#: [facility] 608-01 Encounter: [id number]      Impression:  1  Postoperative MRSA cervical wound infection R/0 vertebral osteomyelitis S/P posterior cervical a VAC UA shin of postoperative collection and debridement with placement of drains C3-T1 POD 10  2  S/P  anterior cervical diskectomy and fixation fusion C5-6 and C6-7 posterior cervical decompression and instrumented fusion C3-T1 on 22  3  History of PAF on Coumadin  4  Factor 5 Leiden mutation  5  WILL    Recommendations:  Afebrile with normal WBC count   Hemoglobin is 7 7 when last taken Wound dressing is dry erythema is unchanged from yesterday  There is no tenderness or areas of fluctuance  1  Final cultures are showing MRSA  2  Continue vancomycin IV with further adjustment of dose by pharmacy  Vanco level is high so dose reduced to 1 gm Q 12 hours with repeat trough on    3  Would anticipate at least a four-week IV vancomycin course followed by approximately 4 weeks of p o  Rx   4  Uric acid was WNL taken to evaluate right 1st MTP joint pain  Pain is reduced on Mobic  5  Patient will be followed as outpatient by neurosurgery and in ID offfice  6  Will need weekly CBC, diff, CRP and BMP  7  Rx for OP vancomycin and labs given to   Antibiotics:  1  Vancomycin 1 gm q 12 hours IV, day 12 of 28 Rx    Subjective: Has mild twinges of left lateral neck discomfort at former drain site  Painful swelling of his right 1st MTP joint is reduced   Denies fevers, chills, or sweats  Denies nausea, vomiting, or diarrhea        Objective:  Vitals:  Temp:  [97 7 °F (36 5 °C)-98 4 °F (36 9 °C)] 97 7 °F (36 5 °C)  HR:  [59-62] 61  Resp:  [14-18] 14  BP: (139-145)/(65-66) 145/65  SpO2:  [94 %] 94 %  Temp (24hrs), Av °F (36 7 °C), Min:97 7 °F (36 5 °C), Max:98 4 °F (36 9 °C)  Current: Temperature: 97 7 °F (36 5 °C)    Physical Exam:     General Appearance: Alert, nontoxic, no acute distress  [place] collar in place  Posterior cervical wound with former MARCIN drain sites clean and dry   Erythema that surrounds the wound appears much less and is not acute appearing  No point tenderness or discharge  Appears comfortable sitting in chair   Throat: Oropharynx moist without lesions  Lips, mucosa, and tongue normal   Neck: [place] collar with posterior cervical wound with staples and retention sutures  Non tendwer     Dressing is dry  1+ surrounding erythema as per picture on chart   Lungs:   Clear to auscultation bilaterally, no audible wheezes, rhonchi or rales; respirations unlabored   Heart:  Regular rate and rhythm, S1, S2 normal, no murmur, rub or gallop   Abdomen:   Soft, non-tender, surgical scars non-distended, positive bowel sounds  No masses, no organomegaly    No CVA tenderness   Extremities: Reduced swelling right 1st MTP joint, no clubbing, cyanosis or edema  PICC line right basilic   Skin: As above, surgical scar  Invasive Devices  Report    Peripherally Inserted Central Catheter Line            PICC Line 73/04/06 Right Basilic 10 days                Labs, Imaging, & Other studies:   All pertinent labs were personally reviewed  Results from last 7 days   Lab Units 04/11/22  0440 04/10/22  0501 04/09/22  0632   WBC Thousand/uL 6 96 7 41 7 61   HEMOGLOBIN g/dL 7 7* 7 6* 7 4*   PLATELETS Thousands/uL 276 263 269     Results from last 7 days   Lab Units 04/10/22  0501 04/07/22  0515 04/07/22  0515 04/05/22  1359 04/05/22  1359   SODIUM mmol/L 139  --  139  --  139   POTASSIUM mmol/L 3 8   < > 3 6   < > 3 8   CHLORIDE mmol/L 105   < > 106   < > 106   CO2 mmol/L 29   < > 30   < > 31   BUN mg/dL 12   < > 9   < > 10   CREATININE mg/dL 0 88   < > 0 74   < > 0 64   EGFR ml/min/1 73sq m 86   < > 92   < > 98   CALCIUM mg/dL 8 5   < > 8 6   < > 8 9    < > = values in this interval not displayed

## 2022-04-12 NOTE — ASSESSMENT & PLAN NOTE
Hx of cervical myelopathy, s/p anterior cervical discectomy and fixation fusion C5/6 and C6/7; Posterior cervical decompression and instrumented fusion C3-T1 with Dr Severiano Pesa on 3/11/22  Discharged to [facility] for rehab on 3/18, then discharged home on 3/29  Noted to have increased redness and drainage at incision site, no improvement after 6 days of Keflex  Referred to ED from OP office visit for further management  · CT cervical spine: Large posterior soft tissue fluid collection, morphology suspicious for infection as clinically suspected  Study is nondiagnostic for evaluation of the central canal and epidural abscess  No specific findings of osteomyelitis  · Admitted under neurosurgery service  · Status post operative washout on 4/1   · ID following and managing antibiotics  · Wound cultures and OR cultures are growing MRSA   · Currently on vancomycin, will need 4 weeks IV antibiotics followed by similar course of p o  Antibiotics, PICC line in place  · Blood cultures - no growth after 5 days    · Analgesics as needed   · PT/OT recommending outpatient rehabilitation once medically stable  Case management aware of need for VNA for home intravenous antibiotics

## 2022-04-13 LAB
APTT PPP: 104 SECONDS (ref 23–37)
BASOPHILS # BLD AUTO: 0.1 THOUSANDS/ΜL (ref 0–0.1)
BASOPHILS NFR BLD AUTO: 2 % (ref 0–1)
CRP SERPL QL: 42.6 MG/L
EOSINOPHIL # BLD AUTO: 0.17 THOUSAND/ΜL (ref 0–0.61)
EOSINOPHIL NFR BLD AUTO: 3 % (ref 0–6)
ERYTHROCYTE [DISTWIDTH] IN BLOOD BY AUTOMATED COUNT: 14 % (ref 11.6–15.1)
HCT VFR BLD AUTO: 23.9 % (ref 36.5–49.3)
HGB BLD-MCNC: 7.7 G/DL (ref 12–17)
IMM GRANULOCYTES # BLD AUTO: 0.06 THOUSAND/UL (ref 0–0.2)
IMM GRANULOCYTES NFR BLD AUTO: 1 % (ref 0–2)
INR PPP: 2.06 (ref 0.84–1.19)
LYMPHOCYTES # BLD AUTO: 1.55 THOUSANDS/ΜL (ref 0.6–4.47)
LYMPHOCYTES NFR BLD AUTO: 23 % (ref 14–44)
MCH RBC QN AUTO: 30.2 PG (ref 26.8–34.3)
MCHC RBC AUTO-ENTMCNC: 32.2 G/DL (ref 31.4–37.4)
MCV RBC AUTO: 94 FL (ref 82–98)
MONOCYTES # BLD AUTO: 0.73 THOUSAND/ΜL (ref 0.17–1.22)
MONOCYTES NFR BLD AUTO: 11 % (ref 4–12)
NEUTROPHILS # BLD AUTO: 4.21 THOUSANDS/ΜL (ref 1.85–7.62)
NEUTS SEG NFR BLD AUTO: 60 % (ref 43–75)
NRBC BLD AUTO-RTO: 0 /100 WBCS
PLATELET # BLD AUTO: 271 THOUSANDS/UL (ref 149–390)
PMV BLD AUTO: 9.4 FL (ref 8.9–12.7)
PROTHROMBIN TIME: 22.2 SECONDS (ref 11.6–14.5)
RBC # BLD AUTO: 2.55 MILLION/UL (ref 3.88–5.62)
WBC # BLD AUTO: 6.82 THOUSAND/UL (ref 4.31–10.16)

## 2022-04-13 PROCEDURE — 86140 C-REACTIVE PROTEIN: CPT | Performed by: INTERNAL MEDICINE

## 2022-04-13 PROCEDURE — 85025 COMPLETE CBC W/AUTO DIFF WBC: CPT | Performed by: NURSE PRACTITIONER

## 2022-04-13 PROCEDURE — 85610 PROTHROMBIN TIME: CPT | Performed by: NURSE PRACTITIONER

## 2022-04-13 PROCEDURE — 99024 POSTOP FOLLOW-UP VISIT: CPT | Performed by: NURSE PRACTITIONER

## 2022-04-13 PROCEDURE — 99232 SBSQ HOSP IP/OBS MODERATE 35: CPT | Performed by: INTERNAL MEDICINE

## 2022-04-13 PROCEDURE — 85730 THROMBOPLASTIN TIME PARTIAL: CPT | Performed by: NEUROLOGICAL SURGERY

## 2022-04-13 PROCEDURE — 99232 SBSQ HOSP IP/OBS MODERATE 35: CPT | Performed by: NURSE PRACTITIONER

## 2022-04-13 RX ORDER — WARFARIN SODIUM 5 MG/1
5 TABLET ORAL WEEKLY
Status: DISCONTINUED | OUTPATIENT
Start: 2022-04-16 | End: 2022-04-15 | Stop reason: HOSPADM

## 2022-04-13 RX ORDER — WARFARIN SODIUM 2.5 MG/1
2.5 TABLET ORAL
Status: DISCONTINUED | OUTPATIENT
Start: 2022-04-14 | End: 2022-04-15 | Stop reason: HOSPADM

## 2022-04-13 RX ORDER — WARFARIN SODIUM 5 MG/1
5 TABLET ORAL WEEKLY
Status: DISCONTINUED | OUTPATIENT
Start: 2022-04-20 | End: 2022-04-15 | Stop reason: HOSPADM

## 2022-04-13 RX ADMIN — FLECAINIDE ACETATE 100 MG: 100 TABLET ORAL at 09:12

## 2022-04-13 RX ADMIN — OXYCODONE HYDROCHLORIDE 5 MG: 5 TABLET ORAL at 20:22

## 2022-04-13 RX ADMIN — Medication 1000 MG: at 00:14

## 2022-04-13 RX ADMIN — FLUTICASONE PROPIONATE 1 SPRAY: 50 SPRAY, METERED NASAL at 09:11

## 2022-04-13 RX ADMIN — GABAPENTIN 300 MG: 300 CAPSULE ORAL at 23:27

## 2022-04-13 RX ADMIN — METHOCARBAMOL TABLETS 750 MG: 750 TABLET, COATED ORAL at 23:27

## 2022-04-13 RX ADMIN — HEPARIN SODIUM 16.1 UNITS/KG/HR: 10000 INJECTION, SOLUTION INTRAVENOUS; SUBCUTANEOUS at 04:24

## 2022-04-13 RX ADMIN — ACETAMINOPHEN 975 MG: 325 TABLET ORAL at 23:27

## 2022-04-13 RX ADMIN — TAMSULOSIN HYDROCHLORIDE 0.4 MG: 0.4 CAPSULE ORAL at 17:29

## 2022-04-13 RX ADMIN — PRAVASTATIN SODIUM 40 MG: 40 TABLET ORAL at 09:12

## 2022-04-13 RX ADMIN — DULOXETINE HYDROCHLORIDE 60 MG: 60 CAPSULE, DELAYED RELEASE ORAL at 09:12

## 2022-04-13 RX ADMIN — SENNOSIDES 8.6 MG: 8.6 TABLET, FILM COATED ORAL at 09:12

## 2022-04-13 RX ADMIN — FLECAINIDE ACETATE 100 MG: 100 TABLET ORAL at 17:29

## 2022-04-13 RX ADMIN — AMLODIPINE BESYLATE 5 MG: 5 TABLET ORAL at 09:12

## 2022-04-13 RX ADMIN — METOPROLOL SUCCINATE 100 MG: 100 TABLET, EXTENDED RELEASE ORAL at 09:12

## 2022-04-13 RX ADMIN — GABAPENTIN 100 MG: 100 CAPSULE ORAL at 09:12

## 2022-04-13 RX ADMIN — Medication 1000 MG: at 23:28

## 2022-04-13 RX ADMIN — METHOCARBAMOL TABLETS 750 MG: 750 TABLET, COATED ORAL at 00:13

## 2022-04-13 RX ADMIN — POTASSIUM CHLORIDE 40 MEQ: 20 TABLET, EXTENDED RELEASE ORAL at 09:11

## 2022-04-13 RX ADMIN — Medication 1000 MG: at 12:25

## 2022-04-13 NOTE — PROGRESS NOTES
[facility] [facility]  Progress Note - Yasmine Hughes 1950, 71 y o  male MRN: [id number]  Unit/Bed#: [facility] 686-53 Encounter: [id number]  Primary Care Provider: Neil Washington MD   Date and time admitted to hospital: 3/31/2022 10:58 AM    Great toe pain, right  Assessment & Plan  · Complaints or R great toe pain with warmth on 4/8  · Quick note placed documenting plan for uric acid level, orthopedics consult and mobic 15mg x3 days (completed 4/10)  · Further medication management with NSAIDS and steroids should be avoided  · patient post op from cervical fusion and NSAIDs can inhibit bony fusion formation  Plan to DC after 3 days  Cervical myelopathy (HCC)  Assessment & Plan  POD 12 posterior cervical evacuation of postoperative collection and debridement with placement of drains C3-T1  · S/p Anterior cervical discectomy and fixation fusion C5/6 and C6/7; Posterior cervical decompression and instrumented fusion C3-T1 with Dr Fabrice Drummond on 3/11/22  · Presented as a direct admission from the office on 03/31 with concerns for wound infection    Imaging:   · CT Cervical w/ contrast 3/31/2022: Large posterior soft tissue fluid collection, morphology suspicious for infection as clinically suspected  Study is nondiagnostic for evaluation of the central canal and epidural abscess  MR is better suited intracanalicular evaluation although will be limited by artifact from metal hardware  Plan:   Continue to monitor neurological exam and symptoms   Blood Cx no growth after 5 days    Wound cx 3/31 + MRSA   Intraop cx tissue culture 4/1 + MRSA   [place] collar to remain in place at all times, okay for [place] collar for showering   MARCIN drains:  o Left MARCIN removed 4/8/2022  o Right MARCIN removed 4/6/2022  · SLIM and ID consulted - appreciate recommendations    · Patient will require 4 weeks of IV vancomycin followed by 4 weeks of p o  antibiotics  · PICC in place  · Vancomycin 1,000 mg q12h (trough level elevated 4/12, repeat on 4/14)  · Continue to monitor incision - photo in chart under media  · Pain well controlled on current pain regimen:  · Gabapentin 100mg daily and 300mg at HS  · Tylenol 975 mg TID prn  · Robaxin 750 mg q 6 hours p r n   · Oxycodone 2 5-5mg q 4 hours p r n  For moderate or severe pain  · Bowel regimen: Senokot, and MiraLax  · Mobilize with PT/OT  Home with outpatient rehab  · Mobilize as tolerated  · DVT ppx: SCDs, on heparin gtt  · Patient started on coumadin   · Plan of care discussed with MIREILLE Morrow  · Plan to discharge home with services on Friday 4/15 (soonest home services can arrive)  Neurosurgery will follow as primary, call with any further questions or concerns  Factor V Leiden mutation [facility])  Assessment & Plan  · Heparin gtt d/c'd in setting of therapeutic INR  · No active signs of bleeding, continue to monitor incision  · Started on coumadin 5mg daily, continue  · INR 2 06  · Will need recheck of INR 5-7 days after discharge with PCP  · SLIM signing off  Subjective/Objective   Chief Complaint:  I am Doing okay      Subjective:  Denies any incisional pain  Denies any new numbness or weakness  Continues to endorse left-sided shoulder pain  Objective:  NAD  Posterior cervical incision picture in chart  I/O       04/11 0701  04/12 0700 04/12 0701  04/13 0700 04/13 0701  04/14 0700    P  O  450      I V  (mL/kg) 320 9 (3)      IV Piggyback 250      Total Intake(mL/kg) 1020 9 (9 5)      Urine (mL/kg/hr) 675 (0 3) 1474 (0 6)     Total Output 675 1474     Net +345 9 -1474            Unmeasured Urine Occurrence  1 x           Invasive Devices  Report    Peripherally Inserted Central Catheter Line            PICC Line 05/93/41 Right Basilic 10 days                Physical Exam:  Vitals: Blood pressure 144/71, pulse 58, temperature 97 8 °F (36 6 °C), resp  rate 16, height 5' 11" (1 803 m), weight 107 kg (235 lb), SpO2 93 %  ,Body mass index is 32 78 kg/m²  General appearance: alert, appears stated age, cooperative and no distress  Head: Normocephalic, without obvious abnormality, atraumatic  Eyes: EOMI, PERRL  Neck:  Posterior cervical incision clean dry and intact, surrounding erythema, photo in chart  Back: no kyphosis present, no tenderness to percussion or palpation  Lungs: non labored breathing  Heart: regular heart rate  Neurologic:   Mental status: Alert, oriented x3, thought content appropriate  Cranial nerves: grossly intact (Cranial nerves II-XII)  Sensory: normal to light touch  Motor:  Right upper extremity weakness +4 over 5 otherwise 5/5  Reflexes: 2+ and symmetric no Jiménez's or clonus      Lab Results:  Results from last 7 days   Lab Units 04/13/22  0538 04/11/22  0440 04/10/22  0501   WBC Thousand/uL 6 82 6 96 7 41   HEMOGLOBIN g/dL 7 7* 7 7* 7 6*   HEMATOCRIT % 23 9* 23 8* 23 8*   PLATELETS Thousands/uL 271 276 263   NEUTROS PCT % 60 60 67   MONOS PCT % 11 11 8     Results from last 7 days   Lab Units 04/10/22  0501 04/07/22  0515   POTASSIUM mmol/L 3 8 3 6   CHLORIDE mmol/L 105 106   CO2 mmol/L 29 30   BUN mg/dL 12 9   CREATININE mg/dL 0 88 0 74   CALCIUM mg/dL 8 5 8 6             Results from last 7 days   Lab Units 04/13/22  0538 04/12/22  0633 04/12/22  0236 04/11/22 2024 04/11/22  1151 04/11/22  0440   INR  2 06* 1 75*  --   --   --  1 27*   PTT seconds 104*  --  76* 67*   < >  --     < > = values in this interval not displayed  No results found for: TROPONINT  ABG:No results found for: PHART, QOS9JGD, PO2ART, EWK9XND, N1AYGPIQ, BEART, SOURCE    Imaging Studies: I have personally reviewed pertinent reports  and I have personally reviewed pertinent films in PACS    CT spine cervical w contrast    Result Date: 3/31/2022  Impression: Large posterior soft tissue fluid collection, morphology suspicious for infection as clinically suspected  Study is nondiagnostic for evaluation of the central canal and epidural abscess    MR is better suited intracanalicular evaluation although will be limited by artifact from metal hardware  No specific findings of osteomyelitis  Workstation performed: [id number]       EKG, Pathology, and Other Studies: I have personally reviewed pertinent reports        VTE Pharmacologic Prophylaxis: Sequential compression device (Venodyne)  and Warfarin (Coumadin)    VTE Mechanical Prophylaxis: sequential compression device

## 2022-04-13 NOTE — ASSESSMENT & PLAN NOTE
With history DVT as well as hx of afib (as below)  · Maintained on Coumadin, home regimen 2 5 mg daily except Wednesday/Saturday takes 5 mg  · Has been bridge with Coumadin, discontinue 4/13 as INR therapeutic at 2 06  · Monitor PT/INR, would recommend repeat INR as outpatient in 5-7 days following discharge, managed by PCP

## 2022-04-13 NOTE — PROGRESS NOTES
Progress Note - Infectious Disease   Orma Shown 71 y o  male MRN: [id number]  Unit/Bed#: [facility] 608-01 Encounter: [id number]      Impression:  1  Postoperative MRSA cervical wound infection R/0 vertebral osteomyelitis S/P posterior cervical a VAC UA shin of postoperative collection and debridement with placement of drains C3-T1 POD 11  2  S/P  anterior cervical diskectomy and fixation fusion C5-6 and C6-7 posterior cervical decompression and instrumented fusion C3-T1 on 22  3  History of PAF on Coumadin  4  Factor 5 Leiden mutation  5  WILL    Recommendations:  Afebrile with normal WBC count   Hemoglobin is 7 7  Wound dressing is dry erythema is unchanged from yesterday and does not appear acute  There is no tenderness or areas of fluctuance  1  Final cultures are showing MRSA  2  Continue vancomycin IV with further adjustment of dose by pharmacy  Present dose is 1 gm Q 12 hours with repeat trough on    3  Would anticipate at least a four-week IV vancomycin course followed by approximately 4 weeks of p o  Rx   4  Uric acid was WNL taken to evaluate right 1st MTP joint pain  Pain is reduced on Mobic  5  Patient will be followed as outpatient by neurosurgery and in ID offfice  6  Will need weekly CBC, diff, CRP and BMP  7  Rx for OP vancomycin and labs given to   8  Discussed at length with patient and his wife who was present by phone           Antibiotics:  1  Vancomycin 1 gm q 12 hours IV, day 13 of  Rx    Subjective: Has mild twinges of left lateral neck discomfort at former drain site  Painful swelling of his right 1st MTP joint is reduced   Denies fevers, chills, or sweats  Denies nausea, vomiting, or diarrhea        Objective:  Vitals:  Temp:  [97 8 °F (36 6 °C)-97 9 °F (36 6 °C)] 97 8 °F (36 6 °C)  HR:  [58-62] 62  Resp:  [16-20] 20  BP: (142-160)/(70-78) 142/70  SpO2:  [90 %-93 %] 93 %  Temp (24hrs), Av 8 °F (36 6 °C), Min:97 8 °F (36 6 °C), Max:97 9 °F (36 6 °C)  Current: Temperature: 97 8 °F (36 6 °C)    Physical Exam:     General Appearance:  Alert, nontoxic, no acute distress  Vista collar in place  Posterior cervical wound with former MARCIN drain sites clean and dry   Erythema that surrounds the wound appears much less and is not acute appearing  No point tenderness or discharge  Appears comfortable sitting i lying in bed  Throat: Oropharynx moist without lesions  Lips, mucosa, and tongue normal   Neck: Vista collar with posterior cervical wound with staples and retention sutures  Non tendwer     Dressing is dry  1+ surrounding erythema as per picture on chart and by my direct exam   Lungs:   Clear to auscultation bilaterally, no audible wheezes, rhonchi or rales; respirations unlabored   Heart:  Regular rate and rhythm, S1, S2 normal, no murmur, rub or gallop   Abdomen:   Soft, non-tender, surgical scars non-distended, positive bowel sounds  No masses, no organomegaly    No CVA tenderness   Extremities: Reduced swelling right 1st MTP joint, no clubbing, cyanosis or edema  PICC line right basilic   Skin: As above, surgical scar  Invasive Devices  Report    Peripherally Inserted Central Catheter Line            PICC Line 94/47/76 Right Basilic 11 days                Labs, Imaging, & Other studies:   All pertinent labs were personally reviewed  Results from last 7 days   Lab Units 04/13/22  0538 04/11/22  0440 04/10/22  0501   WBC Thousand/uL 6 82 6 96 7 41   HEMOGLOBIN g/dL 7 7* 7 7* 7 6*   PLATELETS Thousands/uL 271 276 263     Results from last 7 days   Lab Units 04/10/22  0501 04/07/22  0515 04/07/22  0515   SODIUM mmol/L 139  --  139   POTASSIUM mmol/L 3 8   < > 3 6   CHLORIDE mmol/L 105   < > 106   CO2 mmol/L 29   < > 30   BUN mg/dL 12   < > 9   CREATININE mg/dL 0 88   < > 0 74   EGFR ml/min/1 73sq m 86   < > 92   CALCIUM mg/dL 8 5   < > 8 6    < > = values in this interval not displayed

## 2022-04-13 NOTE — CASE MANAGEMENT
Case Management Discharge Planning Note    Patient name Sophy Ready  Location [facility] 608/[facility] 232-09 MRN [id number]  : 1950 Date 2022       Current Admission Date: 3/31/2022  Current Admission Diagnosis:Surgical site infection   Patient Active Problem List    Diagnosis Date Noted    Great toe pain, right 04/10/2022    Surgical site infection 2022    Status post cervical spinal fusion 2022    Anemia 2022    Head pain cephalgia 2022    Hyponatremia 2022    Muscle spasm 2022    Goals of care, counseling/discussion 2022    Sinus bradycardia 03/10/2022    Cervical myelopathy ([facility] [address] ) 2022    Ambulatory dysfunction 2022    Weakness 2022    Pes anserinus bursitis of right knee 2021    IFG (impaired fasting glucose) 2021    History of DVT of lower extremity 2021    Mixed hyperlipidemia 10/27/2020    Paroxysmal A-fib ([address] ) 2020    Lower extremity edema 2020    Primary osteoarthritis of left knee 2020    Primary osteoarthritis of right knee 2020    Depression, major, single episode, moderate ([facility] [address] ) 10/30/2017    Insomnia 03/10/2017    Lumbar herniated disc 03/10/2017    Erectile dysfunction 2016    Status post catheter ablation of atrial flutter 2015    Essential hypertension 2015    WILL (obstructive sleep apnea) 2015    Factor V Leiden mutation ([facility] [address] ) 2014      LOS (days): 13  Geometric Mean LOS (GMLOS) (days): 5 40  Days to GMLOS:-7 4     OBJECTIVE:  Risk of Unplanned Readmission Score: 26         Current admission status: Inpatient   Preferred Pharmacy:   [address] [facility] [address]  [address]  [address] [address]  Phone: [phone] Fax: [phone]    [facility]pharmacy [id number] [address]  Phone: [phone] Fax: [phone]    Primary Care Provider: Mary Jo Osman MD    Primary Insurance: [facility]  Secondary Insurance:     DISCHARGE DETAILS:    Discharge planning discussed with[de-identified] Patient  Freedom of Choice: Yes  Comments - Freedom of Choice: discussed FOC  CM contacted family/caregiver?: Yes  Were Treatment Team discharge recommendations reviewed with patient/caregiver?: Yes  Did patient/caregiver verbalize understanding of patient care needs?: Yes  Were patient/caregiver advised of the risks associated with not following Treatment Team discharge recommendations?: Yes    Contacts  Patient Contacts: Arley Burdick  Relationship to Patient[de-identified] Family  Contact Method: Phone  Phone Number: [phone]  Reason/Outcome: Discharge Planning,Continuity of Care,Emergency Contact    Other Referral/Resources/Interventions Provided:  Interventions: Home Infusion  Referral Comments: [facility] infusion is out of network with patient insurance, VM left for wife to call this CM back, referral entered for [facility] infusion, patient and wife agreeable

## 2022-04-13 NOTE — ASSESSMENT & PLAN NOTE
POD 12 posterior cervical evacuation of postoperative collection and debridement with placement of drains C3-T1  · S/p Anterior cervical discectomy and fixation fusion C5/6 and C6/7; Posterior cervical decompression and instrumented fusion C3-T1 with Dr Juventino Wallace on 3/11/22  · Presented as a direct admission from the office on 03/31 with concerns for wound infection    Imaging:   · CT Cervical w/ contrast 3/31/2022: Large posterior soft tissue fluid collection, morphology suspicious for infection as clinically suspected  Study is nondiagnostic for evaluation of the central canal and epidural abscess  MR is better suited intracanalicular evaluation although will be limited by artifact from metal hardware  Plan:   Continue to monitor neurological exam and symptoms   Blood Cx no growth after 5 days    Wound cx 3/31 + MRSA   Intraop cx tissue culture 4/1 + MRSA   [place] collar to remain in place at all times, okay for [place] collar for showering   MARCIN drains:  o Left MARCIN removed 4/8/2022  o Right MARCIN removed 4/6/2022  · SLIM and ID consulted - appreciate recommendations  · Patient will require 4 weeks of IV vancomycin followed by 4 weeks of p o  antibiotics  · PICC in place  · Vancomycin 1,000 mg q12h (trough level elevated 4/12, repeat on 4/14)  · Continue to monitor incision - photo in chart under media  · Pain well controlled on current pain regimen:  · Gabapentin 100mg daily and 300mg at HS  · Tylenol 975 mg TID prn  · Robaxin 750 mg q 6 hours p r n   · Oxycodone 2 5-5mg q 4 hours p r n  For moderate or severe pain  · Bowel regimen: Senokot, and MiraLax  · Mobilize with PT/OT  Home with outpatient rehab  · Mobilize as tolerated  · DVT ppx: SCDs, on heparin gtt  · Patient started on coumadin   · Plan of care discussed with MIREILLE Del Angel  · Plan to discharge home with services on Friday 4/15 (soonest home services can arrive)      Neurosurgery will follow as primary, call with any further questions or concerns

## 2022-04-13 NOTE — ASSESSMENT & PLAN NOTE
· Heparin gtt d/c'd in setting of therapeutic INR  · No active signs of bleeding, continue to monitor incision  · Started on coumadin 5mg daily, continue  · INR 2 06  · Will need recheck of INR 5-7 days after discharge with PCP  · SLIM signing off

## 2022-04-13 NOTE — CASE MANAGEMENT
Case Management Progress Note    Patient name Neville Sherman  Location [facility] 608/[facility] 340-49 MRN [id number]  : 1950 Date 2022       LOS (days): 13  Geometric Mean LOS (GMLOS) (days): 5 40  Days to GMLOS:-7 6        OBJECTIVE:        Current admission status: Inpatient  Preferred Pharmacy:   [address]  [address] [place], [facility] [address]  [address] [place] [address]  Phone: [phone] Fax: [phone] [place] [address]  Phone: [phone] Fax: [phone]    Primary Care Provider: Kandice Macias MD    Primary Insurance: [facility]  Secondary Insurance:     PROGRESS NOTE: TC to Cristal Gutierrez stated that she contacted patient's wife, medications and supplies will be delivered to patient house Friday afternoon, with [facility] Friday evening  Verified SOC with [facility] Friday evening with kathy Mauricio for [facility]    Co pay for medication is 20 weekly, which wife is in agreement with cricket Loving      TC to wife, Deo Perez, who is in agreement with EDDIE

## 2022-04-13 NOTE — PROGRESS NOTES
Vancomycin IV Pharmacy-to-Dose Consultation    Quincy White is a 71 y o  male who is currently receiving Vancomycin IV with management by the Pharmacy Consult service  Assessment/Plan:  The patient was reviewed  Renal function is stable and no signs or symptoms of nephrotoxicity and/or infusion reactions were documented in the chart  Based on todays assessment, continue current vancomycin (day # 11) dosing of 1000mg iv q12h, with a plan for trough to be drawn at 1100 tomorrow 4/14  We will continue to follow the patients culture results and clinical progress daily      Deng Nguyen, Pharmacist

## 2022-04-13 NOTE — PLAN OF CARE
Problem: PAIN - ADULT  Goal: Verbalizes/displays adequate comfort level or baseline comfort level  Description: Interventions:  - Encourage patient to monitor pain and request assistance  - Assess pain using appropriate pain scale  - Administer analgesics based on type and severity of pain and evaluate response  - Implement non-pharmacological measures as appropriate and evaluate response  - Consider cultural and social influences on pain and pain management  - Notify physician/advanced practitioner if interventions unsuccessful or patient reports new pain  Outcome: Progressing     Problem: INFECTION - ADULT  Goal: Absence or prevention of progression during hospitalization  Description: INTERVENTIONS:  - Assess and monitor for signs and symptoms of infection  - Monitor lab/diagnostic results  - Monitor all insertion sites, i e  indwelling lines, tubes, and drains  - Monitor endotracheal if appropriate and nasal secretions for changes in amount and color  - [place] appropriate cooling/warming therapies per order  - Administer medications as ordered  - Instruct and encourage patient and family to use good hand hygiene technique  - Identify and instruct in appropriate isolation precautions for identified infection/condition  Outcome: Progressing  Goal: Absence of fever/infection during neutropenic period  Description: INTERVENTIONS:  - Monitor WBC    Outcome: Progressing     Problem: SAFETY ADULT  Goal: Patient will remain free of falls  Description: INTERVENTIONS:  - Educate patient/family on patient safety including physical limitations  - Instruct patient to call for assistance with activity   - Consult OT/PT to assist with strengthening/mobility   - Keep Call bell within reach  - Keep bed low and locked with side rails adjusted as appropriate  - Keep care items and personal belongings within reach  - Initiate and maintain comfort rounds  - Make Fall Risk Sign visible to staff  - Offer Toileting   - Obtain necessary fall risk management equipment  - Apply yellow socks and bracelet for high fall risk patients  - Consider moving patient to room near nurses station  Outcome: Progressing  Goal: Maintain or return to baseline ADL function  Description: INTERVENTIONS:  -  Assess patient's ability to carry out ADLs; assess patient's baseline for ADL function and identify physical deficits which impact ability to perform ADLs (bathing, care of mouth/teeth, toileting, grooming, dressing, etc )  - Assess/evaluate cause of self-care deficits   - Assess range of motion  - Assess patient's mobility; develop plan if impaired  - Assess patient's need for assistive devices and provide as appropriate  - Encourage maximum independence but intervene and supervise when necessary  - Involve family in performance of ADLs  - Assess for home care needs following discharge   - Consider OT consult to assist with ADL evaluation and planning for discharge  - Provide patient education as appropriate  Outcome: Progressing  Goal: Maintains/Returns to pre admission functional level  Description: INTERVENTIONS:  - Perform BMAT or MOVE assessment daily    - Set and communicate daily mobility goal to care team and patient/family/caregiver     - Collaborate with rehabilitation services on mobility goals if consulted  - Out of bed for toileting  - Record patient progress and toleration of activity level   Outcome: Progressing     Problem: DISCHARGE PLANNING  Goal: Discharge to home or other facility with appropriate resources  Description: INTERVENTIONS:  - Identify barriers to discharge w/patient and caregiver  - Arrange for needed discharge resources and transportation as appropriate  - Identify discharge learning needs (meds, wound care, etc )  - Arrange for interpretive services to assist at discharge as needed  - Refer to Case Management Department for coordinating discharge planning if the patient needs post-hospital services based on physician/advanced practitioner order or complex needs related to functional status, cognitive ability, or social support system  Outcome: Progressing     Problem: Knowledge Deficit  Goal: Patient/family/caregiver demonstrates understanding of disease process, treatment plan, medications, and discharge instructions  Description: Complete learning assessment and assess knowledge base    Interventions:  - Provide teaching at level of understanding  - Provide teaching via preferred learning methods  Outcome: Progressing     Problem: Potential for Falls  Goal: Patient will remain free of falls  Description: INTERVENTIONS:  - Educate patient/family on patient safety including physical limitations  - Instruct patient to call for assistance with activity   - Consult OT/PT to assist with strengthening/mobility   - Keep Call bell within reach  - Keep bed low and locked with side rails adjusted as appropriate  - Keep care items and personal belongings within reach  - Initiate and maintain comfort rounds  - Make Fall Risk Sign visible to staff  - Offer [address] necessary fall risk management equipment  - Apply yellow socks and bracelet for high fall risk patients  - Consider moving patient to room near nurses station  Outcome: Progressing     Problem: MOBILITY - ADULT  Goal: Maintain or return to baseline ADL function  Description: INTERVENTIONS:  -  Assess patient's ability to carry out ADLs; assess patient's baseline for ADL function and identify physical deficits which impact ability to perform ADLs (bathing, care of mouth/teeth, toileting, grooming, dressing, etc )  - Assess/evaluate cause of self-care deficits   - Assess range of motion  - Assess patient's mobility; develop plan if impaired  - Assess patient's need for assistive devices and provide as appropriate  - Encourage maximum independence but intervene and supervise when necessary  - Involve family in performance of ADLs  - Assess for home care needs following discharge   - Consider OT consult to assist with ADL evaluation and planning for discharge  - Provide patient education as appropriate  Outcome: Progressing  Goal: Maintains/Returns to pre admission functional level  Description: INTERVENTIONS:  - Perform BMAT or MOVE assessment daily    - Set and communicate daily mobility goal to care team and patient/family/caregiver     - Collaborate with rehabilitation services on mobility goals if consulted  - Out of bed for toileting  - Record patient progress and toleration of activity level   Outcome: Progressing     Problem: Prexisting or High Potential for Compromised Skin Integrity  Goal: Skin integrity is maintained or improved  Description: INTERVENTIONS:  - Identify patients at risk for skin breakdown  - Assess and monitor skin integrity  - Assess and monitor nutrition and hydration status  - Monitor labs   - Assess for incontinence   - Turn and reposition patient  - Assist with mobility/ambulation  - Relieve pressure over bony prominences  - Avoid friction and shearing  - Provide appropriate hygiene as needed including keeping skin clean and dry  - Evaluate need for skin moisturizer/barrier cream  - Collaborate with interdisciplinary team   - Patient/family teaching  - Consider wound care consult   Outcome: Progressing     Problem: MUSCULOSKELETAL - ADULT  Goal: Maintain or return mobility to safest level of function  Description: INTERVENTIONS:  - Assess patient's ability to carry out ADLs; assess patient's baseline for ADL function and identify physical deficits which impact ability to perform ADLs (bathing, care of mouth/teeth, toileting, grooming, dressing, etc )  - Assess/evaluate cause of self-care deficits   - Assess range of motion  - Assess patient's mobility  - Assess patient's need for assistive devices and provide as appropriate  - Encourage maximum independence but intervene and supervise when necessary  - Involve family in performance of ADLs  - Assess for home care needs following discharge   - Consider OT consult to assist with ADL evaluation and planning for discharge  - Provide patient education as appropriate  Outcome: Progressing  Goal: Maintain proper alignment of affected body part  Description: INTERVENTIONS:  - Support, maintain and protect limb and body alignment  - Provide patient/ family with appropriate education  Outcome: Progressing     Problem: Nutrition/Hydration-ADULT  Goal: Nutrient/Hydration intake appropriate for improving, restoring or maintaining nutritional needs  Description: Monitor and assess patient's nutrition/hydration status for malnutrition  Collaborate with interdisciplinary team and initiate plan and interventions as ordered  Monitor patient's weight and dietary intake as ordered or per policy  Utilize nutrition screening tool and intervene as necessary  Determine patient's food preferences and provide high-protein, high-caloric foods as appropriate       INTERVENTIONS:  - Monitor oral intake, urinary output, labs, and treatment plans  - Assess nutrition and hydration status and recommend course of action  - Evaluate amount of meals eaten  - Assist patient with eating if necessary   - Allow adequate time for meals  - Recommend/ encourage appropriate diets, oral nutritional supplements, and vitamin/mineral supplements  - Order, calculate, and assess calorie counts as needed  - Recommend, monitor, and adjust tube feedings and TPN/PPN based on assessed needs  - Assess need for intravenous fluids  - Provide specific nutrition/hydration education as appropriate  - Include patient/family/caregiver in decisions related to nutrition  Outcome: Progressing

## 2022-04-13 NOTE — PROGRESS NOTES
[address] [facility]  Progress Note - Gilberto Jennings 1950, 71 y o  male MRN: [id number]  Unit/Bed#: [facility] 616-70 Encounter: [id number]  Primary Care Provider: Tunde Fay MD   Date and time admitted to hospital: 3/31/2022 10:58 AM    Patient is stable for discharge from an Internal Medicine standpoint pending clearance from ID regarding Vanco levels and final discharge dosing  Rest of plan per primary team  Will follow peripherally, call with questions  * Surgical site infection  Assessment & Plan  Hx of cervical myelopathy, s/p anterior cervical discectomy and fixation fusion C5/6 and C6/7; Posterior cervical decompression and instrumented fusion C3-T1 with Dr Mary Myers on 3/11/22  Discharged to [facility] for rehab on 3/18, then discharged home on 3/29  Noted to have increased redness and drainage at incision site, no improvement after 6 days of Keflex  Referred to ED from OP office visit for further management  · CT cervical spine: Large posterior soft tissue fluid collection, morphology suspicious for infection as clinically suspected  Study is nondiagnostic for evaluation of the central canal and epidural abscess  No specific findings of osteomyelitis  · Admitted under neurosurgery service  · Status post operative washout on 4/1   · ID following and managing antibiotics  · Wound cultures and OR cultures grew MRSA   · Currently on vancomycin, will need 4 weeks IV antibiotics followed by similar course of p o  Antibiotics, PICC line in place  · Blood cultures - no growth after 5 days    · Analgesics as needed   · PT/OT recommending outpatient rehabilitation once medically stable  Case management aware of need for VNA for home IV antibiotics  Great toe pain, right  Assessment & Plan  Presumed gout right 1st MRP joint  Completed Mobic x 3 days  Uric acid level WNL   Seen by ortho, no intervention   · 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  · Symptomatically improved   · Avoid steroids given infection      Factor V Leiden mutation [facility])  Assessment & Plan  With history DVT as well as hx of afib (as below)  · Maintained on Coumadin, home regimen 2 5 mg daily except Wednesday/Saturday takes 5 mg  · Has been bridge with Coumadin, discontinue 4/13 as INR therapeutic at 2 06  · Monitor PT/INR, would recommend repeat INR as outpatient in 5-7 days following discharge, managed by PCP  Paroxysmal BETTINA-perla [facility])  Assessment & Plan  Continue with home dose BB/Flecainide  · Continue coumadin as above, INR now at goal      Cervical myelopathy [facility])  Assessment & Plan  Status post cervical spinal fusion on 3/11/22 as above   · See related plan     Anemia  Assessment & Plan  Appears to be postop anemia since recent surgery in March, hemoglobin between 9-10 since that time and previously within normal limits   · Hemoglobin with slight downward trend from baseline following operative washout  No signs or symptoms of active bleeding   · folate and B12 WNL  · Low Fe level however elevated ferritin  · Hemoglobin is stable, monitor as needed  Mixed hyperlipidemia  Assessment & Plan  · Continue statin     Essential hypertension  Assessment & Plan  BP acceptable, monitor routinely   · Continue home dose Norvasc, metoprolol     WILL (obstructive sleep apnea)  Assessment & Plan  · CPAP qhs       VTE Pharmacologic Prophylaxis: VTE Score: 10 Moderate Risk (Score 3-4) - Pharmacological DVT Prophylaxis Ordered: warfarin (Coumadin)  Patient Centered Rounds: I performed bedside rounds with nursing staff today  Discussions with Specialists or Other Care Team Provider: nursing, case management, Anjana Arteaga with Neurosurgery, made aware patient cleared for d/c from IM standpoint and AC dosing discussed  Education and Discussions with Family / Patient: Patient  Defer family updates to primary team        Time Spent for Care: 30 minutes   More than 50% of total time spent on counseling and coordination of care as described above  Current Length of Stay: 13 day(s)  Current Patient Status: Inpatient   Certification Statement: The patient will continue to require additional inpatient hospital stay due to pending ID clearance, plan as per primary   Discharge Plan: Anticipate discharge later today or tomorrow to home with home services  Code Status: Level 1 - Full Code    Subjective:   Patient offers no acute complaints  Foot pain has almost completely resolved  He is anxious for discharge, hopeful for tomorrow  Objective:     Vitals:   Temp (24hrs), Av 8 °F (36 6 °C), Min:97 8 °F (36 6 °C), Max:97 9 °F (36 6 °C)    Temp:  [97 8 °F (36 6 °C)-97 9 °F (36 6 °C)] 97 8 °F (36 6 °C)  HR:  [58-64] 58  Resp:  [16-18] 16  BP: (144-160)/(69-78) 144/71  SpO2:  [90 %-94 %] 93 %  Body mass index is 32 78 kg/m²  Input and Output Summary (last 24 hours): Intake/Output Summary (Last 24 hours) at 2022 0946  Last data filed at 2022 2154  Gross per 24 hour   Intake --   Output 1224 ml   Net -1224 ml       Physical Exam:   Physical Exam  Vitals and nursing note reviewed  Constitutional:       General: He is not in acute distress  Appearance: He is obese  Neck:      Comments: Cervical collar in place  Cardiovascular:      Rate and Rhythm: Normal rate  Pulmonary:      Breath sounds: Normal breath sounds  Abdominal:      Tenderness: There is no abdominal tenderness  Musculoskeletal:         General: No swelling  Skin:     General: Skin is warm  Neurological:      Mental Status: He is alert  Mental status is at baseline     Psychiatric:         Mood and Affect: Mood normal           Additional Data:     Labs:  Results from last 7 days   Lab Units 22  0538   WBC Thousand/uL 6 82   HEMOGLOBIN g/dL 7 7*   HEMATOCRIT % 23 9*   PLATELETS Thousands/uL 271   NEUTROS PCT % 60   LYMPHS PCT % 23   MONOS PCT % 11   EOS PCT % 3     Results from last 7 days   Lab Units 04/10/22  0501   SODIUM mmol/L 139 POTASSIUM mmol/L 3 8   CHLORIDE mmol/L 105   CO2 mmol/L 29   BUN mg/dL 12   CREATININE mg/dL 0 88   ANION GAP mmol/L 5   CALCIUM mg/dL 8 5   GLUCOSE RANDOM mg/dL 107     Results from last 7 days   Lab Units 04/13/22  0538   INR  2 06*                   Lines/Drains:  Invasive Devices  Report    Peripherally Inserted Central Catheter Line            PICC Line 79/90/43 Right Basilic 10 days                Central Line:  Goal for removal: N/A - Discharging with PICC for IV ABX/medications             Imaging: No pertinent imaging reviewed      Recent Cultures (last 7 days):         Last 24 Hours Medication List:   Current Facility-Administered Medications   Medication Dose Route Frequency Provider Last Rate    acetaminophen  975 mg Oral TID PRN Alison Biundo, FABIOLA      amLODIPine  5 mg Oral Daily Alison Biundo, PA-ALTAGRACIA      DULoxetine  60 mg Oral Daily Alison Biundo, PA-C      flecainide  100 mg Oral BID Alison Biundo, PA-C      fluticasone  1 spray Each Nare Daily DEEPTHI Wisdom      gabapentin  100 mg Oral Daily Alison Biundo, PA-C      gabapentin  300 mg Oral HS Alison Biundo, FABIOLA      methocarbamol  750 mg Oral Q6H PRN DEEPTHI Sanders      metoprolol succinate  100 mg Oral Daily Alison Biundo, PA-ALTAGRACIA      ondansetron  4 mg Intravenous Q6H PRN Alison Biundo, PA-ALTAGRACIA      oxyCODONE  2 5 mg Oral Q4H PRN Alison Biundo, PA-ALTAGRACIA      oxyCODONE  5 mg Oral Q4H PRN Alison Biundo, PA-C      polyethylene glycol  17 g Oral Daily PRN DEEPTHI Tillman      potassium chloride  40 mEq Oral Daily Alison Biundo, PA-ALTAGRACIA      pravastatin  40 mg Oral Daily Alison Biundo, PA-C      senna  8 6 mg Oral Daily Alison Biundo, PA-ALTAGRACIA      tamsulosin  0 4 mg Oral Daily With FABIOLA Pradhan      vancomycin  1,000 mg Intravenous Q12H Ahsan Cazares MD      [START ON 4/14/2022] warfarin  2 5 mg Oral Once per day on Sun Mon Tue Thu Fri [facility] DEEPTHI Castañeda      [START ON 4/16/2022] warfarin  5 mg Oral Weekly DEEPTHI Wisdom      [START ON 4/20/2022] warfarin  5 mg Oral Weekly DEEPTHI Martinez          Today, Patient Was Seen By: DEEPTHI Navas    **Please Note: This note may have been constructed using a voice recognition system  **

## 2022-04-13 NOTE — ASSESSMENT & PLAN NOTE
Hx of cervical myelopathy, s/p anterior cervical discectomy and fixation fusion C5/6 and C6/7; Posterior cervical decompression and instrumented fusion C3-T1 with Dr Balaji Washington on 3/11/22  Discharged to [facility] for rehab on 3/18, then discharged home on 3/29  Noted to have increased redness and drainage at incision site, no improvement after 6 days of Keflex  Referred to ED from OP office visit for further management  · CT cervical spine: Large posterior soft tissue fluid collection, morphology suspicious for infection as clinically suspected  Study is nondiagnostic for evaluation of the central canal and epidural abscess  No specific findings of osteomyelitis  · Admitted under neurosurgery service  · Status post operative washout on 4/1   · ID following and managing antibiotics  · Wound cultures and OR cultures grew MRSA   · Currently on vancomycin, will need 4 weeks IV antibiotics followed by similar course of p o  Antibiotics, PICC line in place  · Blood cultures - no growth after 5 days    · Analgesics as needed   · PT/OT recommending outpatient rehabilitation once medically stable  Case management aware of need for VNA for home IV antibiotics

## 2022-04-13 NOTE — ASSESSMENT & PLAN NOTE
Appears to be postop anemia since recent surgery in March, hemoglobin between 9-10 since that time and previously within normal limits   · Hemoglobin with slight downward trend from baseline following operative washout  No signs or symptoms of active bleeding   · folate and B12 WNL  · Low Fe level however elevated ferritin  · Hemoglobin is stable, monitor as needed

## 2022-04-14 LAB
ANION GAP SERPL CALCULATED.3IONS-SCNC: 4 MMOL/L (ref 4–13)
BASOPHILS # BLD AUTO: 0.1 THOUSANDS/ΜL (ref 0–0.1)
BASOPHILS NFR BLD AUTO: 2 % (ref 0–1)
BUN SERPL-MCNC: 16 MG/DL (ref 5–25)
CALCIUM SERPL-MCNC: 9.1 MG/DL (ref 8.3–10.1)
CHLORIDE SERPL-SCNC: 107 MMOL/L (ref 100–108)
CO2 SERPL-SCNC: 28 MMOL/L (ref 21–32)
CREAT SERPL-MCNC: 1.1 MG/DL (ref 0.6–1.3)
EOSINOPHIL # BLD AUTO: 0.15 THOUSAND/ΜL (ref 0–0.61)
EOSINOPHIL NFR BLD AUTO: 2 % (ref 0–6)
ERYTHROCYTE [DISTWIDTH] IN BLOOD BY AUTOMATED COUNT: 14.2 % (ref 11.6–15.1)
FLUAV RNA RESP QL NAA+PROBE: NEGATIVE
FLUBV RNA RESP QL NAA+PROBE: NEGATIVE
GFR SERPL CREATININE-BSD FRML MDRD: 67 ML/MIN/1.73SQ M
GLUCOSE SERPL-MCNC: 112 MG/DL (ref 65–140)
HCT VFR BLD AUTO: 23.7 % (ref 36.5–49.3)
HGB BLD-MCNC: 7.6 G/DL (ref 12–17)
IMM GRANULOCYTES # BLD AUTO: 0.04 THOUSAND/UL (ref 0–0.2)
IMM GRANULOCYTES NFR BLD AUTO: 1 % (ref 0–2)
INR PPP: 2.06 (ref 0.84–1.19)
LYMPHOCYTES # BLD AUTO: 1.63 THOUSANDS/ΜL (ref 0.6–4.47)
LYMPHOCYTES NFR BLD AUTO: 25 % (ref 14–44)
MCH RBC QN AUTO: 30 PG (ref 26.8–34.3)
MCHC RBC AUTO-ENTMCNC: 32.1 G/DL (ref 31.4–37.4)
MCV RBC AUTO: 94 FL (ref 82–98)
MONOCYTES # BLD AUTO: 0.75 THOUSAND/ΜL (ref 0.17–1.22)
MONOCYTES NFR BLD AUTO: 12 % (ref 4–12)
NEUTROPHILS # BLD AUTO: 3.83 THOUSANDS/ΜL (ref 1.85–7.62)
NEUTS SEG NFR BLD AUTO: 58 % (ref 43–75)
NRBC BLD AUTO-RTO: 0 /100 WBCS
PLATELET # BLD AUTO: 263 THOUSANDS/UL (ref 149–390)
PMV BLD AUTO: 9.8 FL (ref 8.9–12.7)
POTASSIUM SERPL-SCNC: 4.2 MMOL/L (ref 3.5–5.3)
PROTHROMBIN TIME: 22.3 SECONDS (ref 11.6–14.5)
RBC # BLD AUTO: 2.53 MILLION/UL (ref 3.88–5.62)
RSV RNA RESP QL NAA+PROBE: NEGATIVE
SARS-COV-2 RNA RESP QL NAA+PROBE: NEGATIVE
SODIUM SERPL-SCNC: 139 MMOL/L (ref 136–145)
VANCOMYCIN TROUGH SERPL-MCNC: 24.3 UG/ML (ref 10–20)
WBC # BLD AUTO: 6.5 THOUSAND/UL (ref 4.31–10.16)

## 2022-04-14 PROCEDURE — 0241U HB NFCT DS VIR RESP RNA 4 TRGT: CPT | Performed by: NURSE PRACTITIONER

## 2022-04-14 PROCEDURE — 85610 PROTHROMBIN TIME: CPT | Performed by: NURSE PRACTITIONER

## 2022-04-14 PROCEDURE — 85025 COMPLETE CBC W/AUTO DIFF WBC: CPT | Performed by: NURSE PRACTITIONER

## 2022-04-14 PROCEDURE — 94760 N-INVAS EAR/PLS OXIMETRY 1: CPT

## 2022-04-14 PROCEDURE — 80202 ASSAY OF VANCOMYCIN: CPT | Performed by: INTERNAL MEDICINE

## 2022-04-14 PROCEDURE — 99231 SBSQ HOSP IP/OBS SF/LOW 25: CPT | Performed by: INTERNAL MEDICINE

## 2022-04-14 PROCEDURE — 99024 POSTOP FOLLOW-UP VISIT: CPT | Performed by: NURSE PRACTITIONER

## 2022-04-14 PROCEDURE — 99024 POSTOP FOLLOW-UP VISIT: CPT | Performed by: INTERNAL MEDICINE

## 2022-04-14 PROCEDURE — 80048 BASIC METABOLIC PNL TOTAL CA: CPT | Performed by: INTERNAL MEDICINE

## 2022-04-14 RX ADMIN — FLECAINIDE ACETATE 100 MG: 100 TABLET ORAL at 09:06

## 2022-04-14 RX ADMIN — AMLODIPINE BESYLATE 5 MG: 5 TABLET ORAL at 09:06

## 2022-04-14 RX ADMIN — GABAPENTIN 100 MG: 100 CAPSULE ORAL at 09:06

## 2022-04-14 RX ADMIN — FLUTICASONE PROPIONATE 1 SPRAY: 50 SPRAY, METERED NASAL at 09:06

## 2022-04-14 RX ADMIN — PRAVASTATIN SODIUM 40 MG: 40 TABLET ORAL at 09:06

## 2022-04-14 RX ADMIN — DULOXETINE HYDROCHLORIDE 60 MG: 60 CAPSULE, DELAYED RELEASE ORAL at 09:06

## 2022-04-14 RX ADMIN — WARFARIN SODIUM 2.5 MG: 2.5 TABLET ORAL at 17:19

## 2022-04-14 RX ADMIN — ACETAMINOPHEN 975 MG: 325 TABLET ORAL at 17:19

## 2022-04-14 RX ADMIN — FLECAINIDE ACETATE 100 MG: 100 TABLET ORAL at 17:19

## 2022-04-14 RX ADMIN — GABAPENTIN 300 MG: 300 CAPSULE ORAL at 22:41

## 2022-04-14 RX ADMIN — Medication 1000 MG: at 11:13

## 2022-04-14 RX ADMIN — ACETAMINOPHEN 975 MG: 325 TABLET ORAL at 09:06

## 2022-04-14 RX ADMIN — METHOCARBAMOL TABLETS 750 MG: 750 TABLET, COATED ORAL at 17:19

## 2022-04-14 RX ADMIN — TAMSULOSIN HYDROCHLORIDE 0.4 MG: 0.4 CAPSULE ORAL at 17:19

## 2022-04-14 RX ADMIN — SENNOSIDES 8.6 MG: 8.6 TABLET, FILM COATED ORAL at 09:06

## 2022-04-14 RX ADMIN — POTASSIUM CHLORIDE 40 MEQ: 20 TABLET, EXTENDED RELEASE ORAL at 09:06

## 2022-04-14 RX ADMIN — METOPROLOL SUCCINATE 100 MG: 100 TABLET, EXTENDED RELEASE ORAL at 09:06

## 2022-04-14 NOTE — ASSESSMENT & PLAN NOTE
· Heparin gtt d/c'd in setting of therapeutic INR  · No active signs of bleeding, continue to monitor incision  · Started on coumadin 2 5 or 5mg daily, continue  · INR 2 06  · Will need recheck of INR 5-7 days after discharge with PCP - discussed with wife Brain Band and she will schedule  · SLIM signing off

## 2022-04-14 NOTE — PROGRESS NOTES
[facility]  Progress Note - Neville Clipper 1950, 71 y o  male MRN: [id number]  Unit/Bed#: [facility] 264-90 Encounter: [id number]  Primary Care Provider: Kandice Macias MD   Date and time admitted to hospital: 3/31/2022 10:58 AM    Great toe pain, right  Assessment & Plan  Resolved with Mobic x 3 days  Cervical myelopathy (HCC)  Assessment & Plan  POD 13 posterior cervical evacuation of postoperative collection and debridement with placement of drains C3-T1  · S/p Anterior cervical discectomy and fixation fusion C5/6 and C6/7; Posterior cervical decompression and instrumented fusion C3-T1 with Dr Fito Robison on 3/11/22  · Presented as a direct admission from the office on 03/31 with concerns for wound infection    Imaging:   · CT Cervical w/ contrast 3/31/2022: Large posterior soft tissue fluid collection, morphology suspicious for infection as clinically suspected  Study is nondiagnostic for evaluation of the central canal and epidural abscess  MR is better suited intracanalicular evaluation although will be limited by artifact from metal hardware  Plan:   Continue to monitor neurological exam and symptoms   Blood Cx no growth after 5 days    Wound cx 3/31 + MRSA   Intraop cx tissue culture 4/1 + MRSA   Turpin collar to remain in place at all times, okay for [place] collar for showering   MARCIN drains:  o Left MARCIN removed 4/8/2022  o Right MARCIN removed 4/6/2022  · SLIM and ID consulted - appreciate recommendations  · Patient will require 4 weeks of IV vancomycin followed by 4 weeks of p o  antibiotics  · PICC in place  · Vancomycin 1,000 mg q12h (trough level elevated again 4/14 at 24)  · Continue to monitor incision - photo in chart under media  · Pain well controlled on current pain regimen:  · Gabapentin 100mg daily and 300mg at HS  · Tylenol 975 mg TID prn  · Robaxin 750 mg q 6 hours p r n   · Oxycodone 2 5-5mg q 4 hours p r n   For moderate or severe pain  · Bowel regimen: Senokot, and MiraLax  · Mobilize with PT/OT  Home with outpatient rehab  · Mobilize as tolerated  · DVT ppx: SCDs, on coumadin with therapeutic INR  · Patient started on coumadin   · Plan of care discussed with MIREILLE Phillip  · Tentative plan to discharge home tomorrow 4/15, may need to be adjusted secondary to elevated vancomycin trough    Neurosurgery will follow as primary, call with any further questions or concerns  Factor V Leiden mutation [facility])  Assessment & Plan  · Heparin gtt d/c'd in setting of therapeutic INR  · No active signs of bleeding, continue to monitor incision  · Started on coumadin 2 5 or 5mg daily, continue  · INR 2 06  · Will need recheck of INR 5-7 days after discharge with PCP - discussed with wife Adrien Mcarthur and she will schedule  · SLIM signing off  Subjective/Objective   Chief Complaint:  I have a little headache and I feel tired today      Subjective:  States overnight he was experiencing a headache like nerve endings at the top of his scalp that were sensitive  States he placed an ice pack on it and this improved  States he gets these headaches sometimes  Also states that when he woke up this morning he could not get energized to wake up and feels very fatigued  Denies any new numbness or weakness  Denies any incisional pain or drainage  Continues to endorse some left shoulder pain as well as chronic right upper extremity weakness  Objective:  NAD  Posterior cervical incision picture in chart  I/O       04/12 0701  04/13 0700 04/13 0701  04/14 0700 04/14 0701  04/15 0700    P  O   600     I V  (mL/kg)       IV Piggyback       Total Intake(mL/kg)  600 (5 6)     Urine (mL/kg/hr) 1474 (0 6) 600 (0 2)     Total Output 1474 600     Net -1474 0            Unmeasured Urine Occurrence 1 x            Invasive Devices  Report    Peripherally Inserted Central Catheter Line            PICC Line 14/30/66 Right Basilic 12 days                Physical Exam:  Vitals: Blood pressure 158/86, pulse 63, temperature 98 4 °F (36 9 °C), resp  rate 20, height 5' 11" (1 803 m), weight 107 kg (235 lb), SpO2 91 %  ,Body mass index is 32 78 kg/m²  General appearance: alert, appears stated age, cooperative and no distress  Head: Normocephalic, without obvious abnormality, atraumatic  Eyes: EOMI, PERRL  Neck:  Posterior cervical incision picture in chart, retention sutures remain in place, [place] collar in place  Back: no kyphosis present, no tenderness to percussion or palpation  Lungs: non labored breathing  Heart: regular heart rate  Neurologic:   Mental status: Alert, oriented x3, thought content appropriate  Cranial nerves: grossly intact (Cranial nerves II-XII)  Sensory: normal to light touch  Motor:  Right upper extremity 4/5 otherwise 5/5  Reflexes: 2+ and symmetric, no Jiménez's or clonus    Lab Results:  Results from last 7 days   Lab Units 04/14/22  0545 04/13/22  0538 04/11/22  0440   WBC Thousand/uL 6 50 6 82 6 96   HEMOGLOBIN g/dL 7 6* 7 7* 7 7*   HEMATOCRIT % 23 7* 23 9* 23 8*   PLATELETS Thousands/uL 263 271 276   NEUTROS PCT % 58 60 60   MONOS PCT % 12 11 11     Results from last 7 days   Lab Units 04/14/22  1114 04/10/22  0501   POTASSIUM mmol/L 4 2 3 8   CHLORIDE mmol/L 107 105   CO2 mmol/L 28 29   BUN mg/dL 16 12   CREATININE mg/dL 1 10 0 88   CALCIUM mg/dL 9 1 8 5             Results from last 7 days   Lab Units 04/14/22  0545 04/13/22  0538 04/12/22  0633 04/12/22  0236 04/11/22 2024 04/11/22  0440   INR  2 06* 2 06* 1 75*  --   --    < >   PTT seconds  --  104*  --  76* 67*  --     < > = values in this interval not displayed  No results found for: TROPONINT  ABG:No results found for: PHART, MSX2GEO, PO2ART, SZQ2OAF, O4RZYZSF, BEART, SOURCE    Imaging Studies: I have personally reviewed pertinent reports     and I have personally reviewed pertinent films in PACS    CT spine cervical w contrast    Result Date: 3/31/2022  Impression: Large posterior soft tissue fluid collection, morphology suspicious for infection as clinically suspected  Study is nondiagnostic for evaluation of the central canal and epidural abscess  MR is better suited intracanalicular evaluation although will be limited by artifact from metal hardware  No specific findings of osteomyelitis  Workstation performed: [id number]       EKG, Pathology, and Other Studies: I have personally reviewed pertinent reports        VTE Pharmacologic Prophylaxis: Sequential compression device (Venodyne)  and Warfarin (Coumadin)    VTE Mechanical Prophylaxis: sequential compression device

## 2022-04-14 NOTE — PROGRESS NOTES
[address] NOTE - Karine Wei 1950, 71 y o  male MRN: [id number]  Unit/Bed#: [facility] 283-14 Encounter: [id number]  Primary Care Provider: Henna Avilez MD   Date and time admitted to hospital: 3/31/2022 10:58 AM    patient not physically seen, chart reviewed, d/w RN  Nsx (primary team) planning for dc tomorrow 4/15  * Surgical site infection  Assessment & Plan  Hx of cervical myelopathy, s/p anterior cervical discectomy and fixation fusion C5/6 and C6/7; Posterior cervical decompression and instrumented fusion C3-T1 with Dr Salome Gibbs on 3/11/22  Discharged to [facility] for rehab on 3/18, then discharged home on 3/29  Noted to have increased redness and drainage at incision site, no improvement after 6 days of Keflex  Referred to ED from OP office visit for further management  · CT cervical spine: Large posterior soft tissue fluid collection, morphology suspicious for infection as clinically suspected  Study is nondiagnostic for evaluation of the central canal and epidural abscess  No specific findings of osteomyelitis  · Admitted under neurosurgery service  · Status post operative washout on 4/1   · ID following and managing antibiotics  · Wound cultures and OR cultures grew MRSA   · Currently on vancomycin, will need 4 weeks IV antibiotics followed by similar course of p o  Antibiotics, PICC line in place  · Blood cultures - no growth after 5 days    · Analgesics as needed   · PT/OT recommending outpatient rehabilitation once medically stable  Case management aware of need for VNA for home IV antibiotics  Cervical myelopathy (HCC)  Assessment & Plan  Status post cervical spinal fusion on 3/11/22 as above   · See related plan     Factor V Leiden mutation [facility])  Assessment & Plan  With history DVT as well as hx of afib (as below)  · Maintained on Coumadin, home regimen 2 5 mg daily except Wednesday/Saturday takes 5 mg    · Has been bridged with Coumadin, discontinued IV heparin 4/13 as INR therapeutic at 2 06  · Monitor PT/INR, would recommend repeat INR as outpatient in 5-7 days following discharge, managed by PCP  Great toe pain, right  Assessment & Plan  Presumed gout right 1st MRP joint  Completed Mobic x 3 days  Uric acid level WNL  Seen by ortho, no intervention   · 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  · Symptomatically improved   · Avoid steroids given infection      Anemia  Assessment & Plan  Appears to be postop anemia since recent surgery in March, hemoglobin between 9-10 since that time and previously within normal limits   · Hemoglobin with slight downward trend from baseline following operative washout  No signs or symptoms of active bleeding   · folate and B12 WNL  · Low Fe level however elevated ferritin  · Hemoglobin is stable, monitor as needed  Mixed hyperlipidemia  Assessment & Plan  · Continue statin     Paroxysmal A-fib (HCC)  Assessment & Plan  Continue with home dose BB/Flecainide  · Continue coumadin as above, INR now at goal      WILL (obstructive sleep apnea)  Assessment & Plan  · CPAP qhs     Essential hypertension  Assessment & Plan  BP acceptable, monitor routinely   · Continue home dose Norvasc, metoprolol     Headache-resolved as of 4/10/2022  Assessment & Plan  · Resolved          Devan Chou PA-C

## 2022-04-14 NOTE — CASE MANAGEMENT
Case Management Progress Note    Patient name Miryam Vazquez  Location [facility] 608/[facility] 228-67 MRN [id number]  : 1950 Date 2022       LOS (days): 14  Geometric Mean LOS (GMLOS) (days): 5 40  Days to GMLOS:-8 6        OBJECTIVE:        Current admission status: Inpatient  Preferred Pharmacy:   [address]  [address] [facility] [address]  [address] [place] [address]  Phone: [phone] Fax: [phone]    [facility]/pharmacy #[id number], [place] - [address] [place] [address]  Phone: [phone] Fax: [phone]    Primary Care Provider: Zenobia Izquierdo MD    Primary Insurance: [facility] [address]  Secondary Insurance:     PROGRESS NOTE: Vanco trough and current weight faxed to [place] [facility]    Fax confirmation received

## 2022-04-14 NOTE — UTILIZATION REVIEW
Continued Stay Review    Date: 4/14                       Current Patient Class: Inpatient  Current Level of Care: Med surg    HPI:72 y o  male initially admitted on 3/31    Assessment/Plan: Cervical myelopathy, Surgical site infection, Factor V Leiden mutation  POD 13 posterior cervical evacuation of postoperative collection and debridement with placement of drains C3-T1  S/p Anterior cervical discectomy and fixation fusion C5/6 and C6/7; Posterior cervical decompression and instrumented fusion C3-T1    Per Neurosurgery; Vanco trough level elevated again 4/14 at 24  Tentative plan to discharge home tomorrow 4/15, may need to be adjusted secondary to elevated vancomycin trough  Will need 4 weeks IV antibiotics followed by similar course of p o  Antibiotics, PICC line in place    Per CM notes; Plan for d/c with VNA services      Vital Signs:   04/14/22 14:17:57 97 9 °F (36 6 °C) 59 -- 157/79 105 90 % -- --   04/14/22 14:17:24 97 9 °F (36 6 °C) 58 -- 157/79 105 92 % -- --   04/14/22 0928 -- -- -- -- -- -- None (Room air)      Pertinent Labs/Diagnostic Results:       Results from last 7 days   Lab Units 04/14/22  0545 04/13/22  0538 04/11/22  0440 04/10/22  0501 04/10/22  0501 04/09/22  0632 04/09/22  0632   WBC Thousand/uL 6 50 6 82 6 96   < > 7 41   < > 7 61   HEMOGLOBIN g/dL 7 6* 7 7* 7 7*  --  7 6*  --  7 4*   HEMATOCRIT % 23 7* 23 9* 23 8*  --  23 8*  --  22 9*   PLATELETS Thousands/uL 263 271 276   < > 263   < > 269   NEUTROS ABS Thousands/µL 3 83 4 21 4 20   < > 4 93   < > 5 20    < > = values in this interval not displayed           Results from last 7 days   Lab Units 04/14/22  1114 04/10/22  0501   SODIUM mmol/L 139 139   POTASSIUM mmol/L 4 2 3 8   CHLORIDE mmol/L 107 105   CO2 mmol/L 28 29   ANION GAP mmol/L 4 5   BUN mg/dL 16 12   CREATININE mg/dL 1 10 0 88   EGFR ml/min/1 73sq m 67 86   CALCIUM mg/dL 9 1 8 5             Results from last 7 days   Lab Units 04/14/22  1114 04/10/22  0501   GLUCOSE RANDOM mg/dL 112 107         Results from last 7 days   Lab Units 04/14/22  0545 04/13/22  0538 04/12/22  0633 04/12/22  0236 04/11/22 2024 04/11/22  0440   PROTIME seconds 22 3* 22 2* 19 6*  --   --    < >   INR  2 06* 2 06* 1 75*  --   --    < >   PTT seconds  --  104*  --  76* 67*  --     < > = values in this interval not displayed  Results from last 7 days   Lab Units 04/13/22  0538   CRP mg/L 42 6*     Medications:   Scheduled Medications:  amLODIPine, 5 mg, Oral, Daily  DULoxetine, 60 mg, Oral, Daily  flecainide, 100 mg, Oral, BID  fluticasone, 1 spray, Each Nare, Daily  gabapentin, 100 mg, Oral, Daily  gabapentin, 300 mg, Oral, HS  metoprolol succinate, 100 mg, Oral, Daily  potassium chloride, 40 mEq, Oral, Daily  pravastatin, 40 mg, Oral, Daily  senna, 8 6 mg, Oral, Daily  tamsulosin, 0 4 mg, Oral, Daily With Dinner  vancomycin, 1,000 mg, Intravenous, Q12H  warfarin, 2 5 mg, Oral, Once per day on Sun Mon Tue Thu Fri  [START ON 4/16/2022] warfarin, 5 mg, Oral, Weekly  [START ON 4/20/2022] warfarin, 5 mg, Oral, Weekly      Continuous IV Infusions: None     PRN Meds:  acetaminophen, 975 mg, Oral, TID PRN 4/14 x1  methocarbamol, 750 mg, Oral, Q6H PRN  ondansetron, 4 mg, Intravenous, Q6H PRN  oxyCODONE, 2 5 mg, Oral, Q4H PRN  oxyCODONE, 5 mg, Oral, Q4H PRN  polyethylene glycol, 17 g, Oral, Daily PRN        Discharge Plan: See above    Network Utilization Review Department  ATTENTION: Please call with any questions or concerns to [phone] and carefully listen to the prompts so that you are directed to the right person  All voicemails are confidential   Kellie Amrit all requests for admission clinical reviews, approved or denied determinations and any other requests to dedicated fax number below belonging to the campus where the patient is receiving treatment   List of dedicated fax numbers for the Facilities:  FACILITY NAME UR FAX NUMBER   ADMISSION DENIALS (Administrative/Medical Necessity) [phone]   PARENT CHILD HEALTH (Maternity/NICU/Pediatrics) [address] [facility] [facility] 40 125 [facility]  [phone]   Marine P[address]rez [address] [facility] [place] [address] [address] [place] [address] Sanju Bianca [facility] [address] [facility] [phone]

## 2022-04-14 NOTE — PLAN OF CARE
Problem: PAIN - ADULT  Goal: Verbalizes/displays adequate comfort level or baseline comfort level  Description: Interventions:  - Encourage patient to monitor pain and request assistance  - Assess pain using appropriate pain scale  - Administer analgesics based on type and severity of pain and evaluate response  - Implement non-pharmacological measures as appropriate and evaluate response  - Consider cultural and social influences on pain and pain management  - Notify physician/advanced practitioner if interventions unsuccessful or patient reports new pain  Outcome: Progressing     Problem: INFECTION - ADULT  Goal: Absence or prevention of progression during hospitalization  Description: INTERVENTIONS:  - Assess and monitor for signs and symptoms of infection  - Monitor lab/diagnostic results  - Monitor all insertion sites, i e  indwelling lines, tubes, and drains  - Monitor endotracheal if appropriate and nasal secretions for changes in amount and color  - [place] appropriate cooling/warming therapies per order  - Administer medications as ordered  - Instruct and encourage patient and family to use good hand hygiene technique  - Identify and instruct in appropriate isolation precautions for identified infection/condition  Outcome: Progressing  Goal: Absence of fever/infection during neutropenic period  Description: INTERVENTIONS:  - Monitor WBC    Outcome: Progressing     Problem: SAFETY ADULT  Goal: Patient will remain free of falls  Description: INTERVENTIONS:  - Educate patient/family on patient safety including physical limitations  - Instruct patient to call for assistance with activity   - Consult OT/PT to assist with strengthening/mobility   - Keep Call bell within reach  - Keep bed low and locked with side rails adjusted as appropriate  - Keep care items and personal belongings within reach  - Initiate and maintain comfort rounds  - Make Fall Risk Sign visible to staff  - Offer Toileting   - Obtain necessary fall risk management equipment  - Apply yellow socks and bracelet for high fall risk patients  - Consider moving patient to room near nurses station  Outcome: Progressing  Goal: Maintain or return to baseline ADL function  Description: INTERVENTIONS:  -  Assess patient's ability to carry out ADLs; assess patient's baseline for ADL function and identify physical deficits which impact ability to perform ADLs (bathing, care of mouth/teeth, toileting, grooming, dressing, etc )  - Assess/evaluate cause of self-care deficits   - Assess range of motion  - Assess patient's mobility; develop plan if impaired  - Assess patient's need for assistive devices and provide as appropriate  - Encourage maximum independence but intervene and supervise when necessary  - Involve family in performance of ADLs  - Assess for home care needs following discharge   - Consider OT consult to assist with ADL evaluation and planning for discharge  - Provide patient education as appropriate  Outcome: Progressing  Goal: Maintains/Returns to pre admission functional level  Description: INTERVENTIONS:  - Perform BMAT or MOVE assessment daily    - Set and communicate daily mobility goal to care team and patient/family/caregiver     - Collaborate with rehabilitation services on mobility goals if consulted  - Out of bed for toileting  - Record patient progress and toleration of activity level   Outcome: Progressing     Problem: DISCHARGE PLANNING  Goal: Discharge to home or other facility with appropriate resources  Description: INTERVENTIONS:  - Identify barriers to discharge w/patient and caregiver  - Arrange for needed discharge resources and transportation as appropriate  - Identify discharge learning needs (meds, wound care, etc )  - Arrange for interpretive services to assist at discharge as needed  - Refer to Case Management Department for coordinating discharge planning if the patient needs post-hospital services based on physician/advanced practitioner order or complex needs related to functional status, cognitive ability, or social support system  Outcome: Progressing     Problem: Knowledge Deficit  Goal: Patient/family/caregiver demonstrates understanding of disease process, treatment plan, medications, and discharge instructions  Description: Complete learning assessment and assess knowledge base    Interventions:  - Provide teaching at level of understanding  - Provide teaching via preferred learning methods  Outcome: Progressing     Problem: Potential for Falls  Goal: Patient will remain free of falls  Description: INTERVENTIONS:  - Educate patient/family on patient safety including physical limitations  - Instruct patient to call for assistance with activity   - Consult OT/PT to assist with strengthening/mobility   - Keep Call bell within reach  - Keep bed low and locked with side rails adjusted as appropriate  - Keep care items and personal belongings within reach  - Initiate and maintain comfort rounds  - Make Fall Risk Sign visible to staff  - Offer [address] necessary fall risk management equipment  - Apply yellow socks and bracelet for high fall risk patients  - Consider moving patient to room near nurses station  Outcome: Progressing     Problem: MOBILITY - ADULT  Goal: Maintain or return to baseline ADL function  Description: INTERVENTIONS:  -  Assess patient's ability to carry out ADLs; assess patient's baseline for ADL function and identify physical deficits which impact ability to perform ADLs (bathing, care of mouth/teeth, toileting, grooming, dressing, etc )  - Assess/evaluate cause of self-care deficits   - Assess range of motion  - Assess patient's mobility; develop plan if impaired  - Assess patient's need for assistive devices and provide as appropriate  - Encourage maximum independence but intervene and supervise when necessary  - Involve family in performance of ADLs  - Assess for home care needs following discharge   - Consider OT consult to assist with ADL evaluation and planning for discharge  - Provide patient education as appropriate  Outcome: Progressing  Goal: Maintains/Returns to pre admission functional level  Description: INTERVENTIONS:  - Perform BMAT or MOVE assessment daily    - Set and communicate daily mobility goal to care team and patient/family/caregiver     - Collaborate with rehabilitation services on mobility goals if consulted  - Out of bed for toileting  - Record patient progress and toleration of activity level   Outcome: Progressing     Problem: Prexisting or High Potential for Compromised Skin Integrity  Goal: Skin integrity is maintained or improved  Description: INTERVENTIONS:  - Identify patients at risk for skin breakdown  - Assess and monitor skin integrity  - Assess and monitor nutrition and hydration status  - Monitor labs   - Assess for incontinence   - Turn and reposition patient  - Assist with mobility/ambulation  - Relieve pressure over bony prominences  - Avoid friction and shearing  - Provide appropriate hygiene as needed including keeping skin clean and dry  - Evaluate need for skin moisturizer/barrier cream  - Collaborate with interdisciplinary team   - Patient/family teaching  - Consider wound care consult   Outcome: Progressing     Problem: MUSCULOSKELETAL - ADULT  Goal: Maintain or return mobility to safest level of function  Description: INTERVENTIONS:  - Assess patient's ability to carry out ADLs; assess patient's baseline for ADL function and identify physical deficits which impact ability to perform ADLs (bathing, care of mouth/teeth, toileting, grooming, dressing, etc )  - Assess/evaluate cause of self-care deficits   - Assess range of motion  - Assess patient's mobility  - Assess patient's need for assistive devices and provide as appropriate  - Encourage maximum independence but intervene and supervise when necessary  - Involve family in performance of ADLs  - Assess for home care needs following discharge   - Consider OT consult to assist with ADL evaluation and planning for discharge  - Provide patient education as appropriate  Outcome: Progressing  Goal: Maintain proper alignment of affected body part  Description: INTERVENTIONS:  - Support, maintain and protect limb and body alignment  - Provide patient/ family with appropriate education  Outcome: Progressing     Problem: Nutrition/Hydration-ADULT  Goal: Nutrient/Hydration intake appropriate for improving, restoring or maintaining nutritional needs  Description: Monitor and assess patient's nutrition/hydration status for malnutrition  Collaborate with interdisciplinary team and initiate plan and interventions as ordered  Monitor patient's weight and dietary intake as ordered or per policy  Utilize nutrition screening tool and intervene as necessary  Determine patient's food preferences and provide high-protein, high-caloric foods as appropriate       INTERVENTIONS:  - Monitor oral intake, urinary output, labs, and treatment plans  - Assess nutrition and hydration status and recommend course of action  - Evaluate amount of meals eaten  - Assist patient with eating if necessary   - Allow adequate time for meals  - Recommend/ encourage appropriate diets, oral nutritional supplements, and vitamin/mineral supplements  - Order, calculate, and assess calorie counts as needed  - Recommend, monitor, and adjust tube feedings and TPN/PPN based on assessed needs  - Assess need for intravenous fluids  - Provide specific nutrition/hydration education as appropriate  - Include patient/family/caregiver in decisions related to nutrition  Outcome: Progressing

## 2022-04-14 NOTE — ASSESSMENT & PLAN NOTE
Hx of cervical myelopathy, s/p anterior cervical discectomy and fixation fusion C5/6 and C6/7; Posterior cervical decompression and instrumented fusion C3-T1 with Dr Yenifer Linn on 3/11/22  Discharged to [facility] for rehab on 3/18, then discharged home on 3/29  Noted to have increased redness and drainage at incision site, no improvement after 6 days of Keflex  Referred to ED from OP office visit for further management  · CT cervical spine: Large posterior soft tissue fluid collection, morphology suspicious for infection as clinically suspected  Study is nondiagnostic for evaluation of the central canal and epidural abscess  No specific findings of osteomyelitis  · Admitted under neurosurgery service  · Status post operative washout on 4/1   · ID following and managing antibiotics  · Wound cultures and OR cultures grew MRSA   · Currently on vancomycin, will need 4 weeks IV antibiotics followed by similar course of p o  Antibiotics, PICC line in place  · Blood cultures - no growth after 5 days    · Analgesics as needed   · PT/OT recommending outpatient rehabilitation once medically stable  Case management aware of need for VNA for home IV antibiotics

## 2022-04-14 NOTE — PROGRESS NOTES
Progress Note - Infectious Disease   Iva Suarez 71 y o  male MRN: [id number]  Unit/Bed#: [facility] 608-01 Encounter: [id number]      Impression:  1  Postoperative MRSA cervical wound infection R/0 vertebral osteomyelitis S/P posterior cervical a VAC UA shin of postoperative collection and debridement with placement of drains C3-T1 POD 12  2  S/P  anterior cervical diskectomy and fixation fusion C5-6 and C6-7 posterior cervical decompression and instrumented fusion C3-T1 on 03/11/22  3  History of PAF on Coumadin  4  Factor 5 Leiden mutation  5  WILL    Recommendations:  Afebrile with normal WBC count   Hemoglobin is 7 6  Creatinine is normal and CRP is 42 6  Wound dressing is dry erythema is unchanged from yesterday and does not appear acute  There is no tenderness or areas of fluctuance  However patient states that he does not feel well today  Has had intermittent headache and just feels fatigued  He asked for a COVID test which was done by primary service  The repeat vancomycin trough is still elevated at 24 3  1  Final cultures are showing MRSA  2  Continue vancomycin IV  Discussed with pharmacy who will adjust dose to 1 5 g Q 24 hours IV beginning tomorrow at 1730 hours  3  Would anticipate at least a four-week IV vancomycin course followed by approximately 4 weeks of p o  Rx   3  Check COVID PCR per patient request   4  Uric acid was WNL taken to evaluate right 1st MTP joint pain  Pain is reduced on Mobic  5  Patient will be followed as outpatient by neurosurgery and in ID offfice  6  Will need weekly CBC, diff, CRP and BMP  7  Rx for OP vancomycin and labs given to   8  Discussed at length with patient and primary service           Antibiotics:  1  Vancomycin 1 5 gm q 124 hours IV, day 14 of 28 Rx    Subjective:  Complains of feeling blah    Generally fatigued with occasional mild headache  Denies fevers, chills, or sweats    Denies nausea, vomiting, or diarrhea  Objective:  Vitals:  Temp:  [97 9 °F (36 6 °C)-98 4 °F (36 9 °C)] 97 9 °F (36 6 °C)  HR:  [57-63] 59  Resp:  [20] 20  BP: ()/(46-86) 157/79  SpO2:  [90 %-93 %] 90 %  Temp (24hrs), Av 2 °F (36 8 °C), Min:97 9 °F (36 6 °C), Max:98 4 °F (36 9 °C)  Current: Temperature: 97 9 °F (36 6 °C)    Physical Exam:     General Appearance:  Alert, nontoxic, no acute distress  Vista collar in place  Posterior cervical wound with former MARCIN drain sites clean and dry   Erythema that surrounds the wound appears is not acute appearing  No point tenderness or discharge  Appears comfortable sitting  lying in bed  Throat: Oropharynx moist without lesions  Lips, mucosa, and tongue normal   Neck: [place] collar with posterior cervical wound with staples and retention sutures  Non tendwer     Dressing is dry  1+ surrounding erythema as per my direct exam   Lungs:   Clear to auscultation bilaterally, no audible wheezes, rhonchi or rales; respirations unlabored   Heart:  Regular rate and rhythm, S1, S2 normal, no murmur, rub or gallop   Abdomen:   Soft, non-tender, surgical scars non-distended, positive bowel sounds  No masses, no organomegaly    No CVA tenderness   Extremities: Reduced swelling right 1st MTP joint, no clubbing, cyanosis or edema  PICC line right basilic   Skin: As above, surgical scar           Invasive Devices  Report    Peripherally Inserted Central Catheter Line            PICC Line / Right Basilic 12 days                Labs, Imaging, & Other studies:   All pertinent labs were personally reviewed  Results from last 7 days   Lab Units 22  0545 22  0538 22  0440   WBC Thousand/uL 6 50 6 82 6 96   HEMOGLOBIN g/dL 7 6* 7 7* 7 7*   PLATELETS Thousands/uL 263 271 276     Results from last 7 days   Lab Units 22  1114 04/10/22  0501 04/10/22  0501   SODIUM mmol/L 139  --  139   POTASSIUM mmol/L 4 2   < > 3 8   CHLORIDE mmol/L 107   < > 105   CO2 mmol/L 28   < > 29 BUN mg/dL 16   < > 12   CREATININE mg/dL 1 10   < > 0 88   EGFR ml/min/1 73sq m 67   < > 86   CALCIUM mg/dL 9 1   < > 8 5    < > = values in this interval not displayed

## 2022-04-14 NOTE — ASSESSMENT & PLAN NOTE
POD 13 posterior cervical evacuation of postoperative collection and debridement with placement of drains C3-T1  · S/p Anterior cervical discectomy and fixation fusion C5/6 and C6/7; Posterior cervical decompression and instrumented fusion C3-T1 with Dr Maria Teresa Bentley on 3/11/22  · Presented as a direct admission from the office on 03/31 with concerns for wound infection    Imaging:   · CT Cervical w/ contrast 3/31/2022: Large posterior soft tissue fluid collection, morphology suspicious for infection as clinically suspected  Study is nondiagnostic for evaluation of the central canal and epidural abscess  MR is better suited intracanalicular evaluation although will be limited by artifact from metal hardware  Plan:   Continue to monitor neurological exam and symptoms   Blood Cx no growth after 5 days    Wound cx 3/31 + MRSA   Intraop cx tissue culture 4/1 + MRSA   [place] collar to remain in place at all times, okay for [place] collar for showering   MARCIN drains:  o Left MARCIN removed 4/8/2022  o Right MARCIN removed 4/6/2022  · SLIM and ID consulted - appreciate recommendations  · Patient will require 4 weeks of IV vancomycin followed by 4 weeks of p o  antibiotics  · PICC in place  · Vancomycin 1,000 mg q12h (trough level elevated again 4/14 at 24)  · Continue to monitor incision - photo in chart under media  · Pain well controlled on current pain regimen:  · Gabapentin 100mg daily and 300mg at HS  · Tylenol 975 mg TID prn  · Robaxin 750 mg q 6 hours p r n   · Oxycodone 2 5-5mg q 4 hours p r n  For moderate or severe pain  · Bowel regimen: Senokot, and MiraLax  · Mobilize with PT/OT  Home with outpatient rehab  · Mobilize as tolerated  · DVT ppx: SCDs, on coumadin with therapeutic INR  · Patient started on coumadin   · Plan of care discussed with MIREILLE Forrester    · Tentative plan to discharge home tomorrow 4/15, may need to be adjusted secondary to elevated vancomycin trough    Neurosurgery will follow as primary, call with any further questions or concerns

## 2022-04-14 NOTE — CASE MANAGEMENT
Case Management Progress Note    Patient name Gina Hooper  Location [facility] 608/[facility] 887-01 MRN [id number]  : 1950 Date 2022       LOS (days): 14  Geometric Mean LOS (GMLOS) (days): 5 40  Days to GMLOS:-8 6        OBJECTIVE:        Current admission status: Inpatient  Preferred Pharmacy:   [address]  [address] [facility] [address]  [address] [place] [address]  Phone: [phone] Fax: [phone]    [id number] [address]  Phone: [phone] Fax: [phone]    Primary Care Provider: Jeffrey Nguyen MD    Primary Insurance: [facility]  Secondary Insurance:     PROGRESS NOTE: Vancomycin dose changed to 1 5 g daily  Potentially patient can be discharged tomorrow 4/15 after 5:30 pm daily dose    SOC for [facility] changed to 4:30 on , per Cecilia Delacruz, jassi for VNA

## 2022-04-14 NOTE — PROGRESS NOTES
Vancomycin Assessment    Kelley Mcguire is a 71 y o  male who is currently receiving vancomycin 1 gm q12 for osteomyelitis bone/joint   Relevant clinical data and objective history reviewed:  Creatinine   Date Value Ref Range Status   04/14/2022 1 10 0 60 - 1 30 mg/dL Final     Comment:     Standardized to IDMS reference method   04/10/2022 0 88 0 60 - 1 30 mg/dL Final     Comment:     Standardized to IDMS reference method   04/07/2022 0 74 0 60 - 1 30 mg/dL Final     Comment:     Standardized to IDMS reference method   10/25/2017 1 24 0 76 - 1 27 mg/dL Final   11/03/2016 1 11 0 76 - 1 27 mg/dL Final   01/23/2015 1 20 0 60 - 1 30 mg/dL Final     Comment:     Standardized to IDMS reference method     /79   Pulse 59   Temp 97 9 °F (36 6 °C)   Resp 20   Ht 5' 11" (1 803 m)   Wt 113 kg (249 lb 5 4 oz)   SpO2 90%   BMI 34 78 kg/m²   I/O last 3 completed shifts: In: 600 [P O :600]  Out: 824 [Urine:824]  Lab Results   Component Value Date/Time    BUN 16 04/14/2022 11:14 AM    BUN 17 11/02/2021 01:02 PM    WBC 6 50 04/14/2022 05:45 AM    WBC 8 43 01/23/2015 04:40 AM    HGB 7 6 (L) 04/14/2022 05:45 AM    HGB 12 9 01/23/2015 04:40 AM    HCT 23 7 (L) 04/14/2022 05:45 AM    HCT 39 5 01/23/2015 04:40 AM    MCV 94 04/14/2022 05:45 AM    MCV 93 01/23/2015 04:40 AM     04/14/2022 05:45 AM     01/23/2015 04:40 AM     Temp Readings from Last 3 Encounters:   04/14/22 97 9 °F (36 6 °C)   03/31/22 (!) 96 4 °F (35 8 °C) (Tympanic)   03/29/22 97 7 °F (36 5 °C) (Oral)     Vancomycin Days of Therapy: 12    Assessment/Plan  The patient is currently on vancomycin utilizing scheduled dosing  Baseline risks associated with therapy include: advanced age and obese   The patient is receiving 1 gm q12 with the most recent vancomycin level being at steady-state and supratherapeutic based on a goal of 15-20 (appropriate for most indications) ; therefore, after clinical evaluation will be changed to 1 5 gm  q24   Pharmacy will continue to follow closely for s/sx of nephrotoxicity, infusion reactions, and appropriateness of therapy  BMP and CBC will be ordered per protocol  Plan for trough as patient approaches steady state, prior to the 4th  dose at approximately [phone]  Pharmacy will continue to follow the patients culture results and clinical progress daily      Broderick Berger, Pharmacist

## 2022-04-14 NOTE — ASSESSMENT & PLAN NOTE
With history DVT as well as hx of afib (as below)  · Maintained on Coumadin, home regimen 2 5 mg daily except Wednesday/Saturday takes 5 mg  · Has been bridged with Coumadin, discontinued IV heparin 4/13 as INR therapeutic at 2 06  · Monitor PT/INR, would recommend repeat INR as outpatient in 5-7 days following discharge, managed by PCP

## 2022-04-15 VITALS
DIASTOLIC BLOOD PRESSURE: 78 MMHG | SYSTOLIC BLOOD PRESSURE: 168 MMHG | HEART RATE: 57 BPM | RESPIRATION RATE: 16 BRPM | WEIGHT: 249.34 LBS | OXYGEN SATURATION: 93 % | TEMPERATURE: 97.6 F | HEIGHT: 71 IN | BODY MASS INDEX: 34.91 KG/M2

## 2022-04-15 DIAGNOSIS — A49.02 MRSA (METHICILLIN RESISTANT STAPHYLOCOCCUS AUREUS): Primary | ICD-10-CM

## 2022-04-15 PROCEDURE — 99024 POSTOP FOLLOW-UP VISIT: CPT | Performed by: PHYSICIAN ASSISTANT

## 2022-04-15 PROCEDURE — 99232 SBSQ HOSP IP/OBS MODERATE 35: CPT | Performed by: INTERNAL MEDICINE

## 2022-04-15 RX ORDER — ACETAMINOPHEN 325 MG/1
975 TABLET ORAL 3 TIMES DAILY PRN
Refills: 0
Start: 2022-04-15

## 2022-04-15 RX ORDER — OXYCODONE HYDROCHLORIDE 5 MG/1
2.5-5 TABLET ORAL EVERY 4 HOURS PRN
Qty: 20 TABLET | Refills: 0 | Status: ON HOLD | OUTPATIENT
Start: 2022-04-15 | End: 2022-04-22 | Stop reason: ALTCHOICE

## 2022-04-15 RX ORDER — METHOCARBAMOL 750 MG/1
750 TABLET, FILM COATED ORAL EVERY 6 HOURS PRN
Qty: 30 TABLET | Refills: 0 | Status: SHIPPED | OUTPATIENT
Start: 2022-04-15 | End: 2022-06-10 | Stop reason: SDUPTHER

## 2022-04-15 RX ADMIN — METHOCARBAMOL TABLETS 750 MG: 750 TABLET, COATED ORAL at 00:00

## 2022-04-15 RX ADMIN — SENNOSIDES 8.6 MG: 8.6 TABLET, FILM COATED ORAL at 09:24

## 2022-04-15 RX ADMIN — GABAPENTIN 100 MG: 100 CAPSULE ORAL at 09:24

## 2022-04-15 RX ADMIN — WARFARIN SODIUM 2.5 MG: 2.5 TABLET ORAL at 16:43

## 2022-04-15 RX ADMIN — VANCOMYCIN HYDROCHLORIDE 1500 MG: 5 INJECTION, POWDER, LYOPHILIZED, FOR SOLUTION INTRAVENOUS at 16:42

## 2022-04-15 RX ADMIN — DULOXETINE HYDROCHLORIDE 60 MG: 60 CAPSULE, DELAYED RELEASE ORAL at 09:24

## 2022-04-15 RX ADMIN — FLUTICASONE PROPIONATE 1 SPRAY: 50 SPRAY, METERED NASAL at 09:26

## 2022-04-15 RX ADMIN — ACETAMINOPHEN 975 MG: 325 TABLET ORAL at 09:24

## 2022-04-15 RX ADMIN — ONDANSETRON 4 MG: 2 INJECTION INTRAMUSCULAR; INTRAVENOUS at 15:59

## 2022-04-15 RX ADMIN — FLECAINIDE ACETATE 100 MG: 100 TABLET ORAL at 09:26

## 2022-04-15 RX ADMIN — FLECAINIDE ACETATE 100 MG: 100 TABLET ORAL at 16:43

## 2022-04-15 RX ADMIN — TAMSULOSIN HYDROCHLORIDE 0.4 MG: 0.4 CAPSULE ORAL at 16:43

## 2022-04-15 RX ADMIN — PRAVASTATIN SODIUM 40 MG: 40 TABLET ORAL at 09:24

## 2022-04-15 RX ADMIN — ACETAMINOPHEN 975 MG: 325 TABLET ORAL at 00:00

## 2022-04-15 RX ADMIN — POTASSIUM CHLORIDE 40 MEQ: 20 TABLET, EXTENDED RELEASE ORAL at 09:24

## 2022-04-15 RX ADMIN — METOPROLOL SUCCINATE 100 MG: 100 TABLET, EXTENDED RELEASE ORAL at 09:24

## 2022-04-15 RX ADMIN — AMLODIPINE BESYLATE 5 MG: 5 TABLET ORAL at 09:24

## 2022-04-15 NOTE — DISCHARGE SUMMARY
[address] [facility]  Discharge- Praveen Hill 1950, 71 y o  male MRN: [id number]  Unit/Bed#: [facility] 763-03 Encounter: [id number]  Primary Care Provider: Gricel Turk MD   Date and time admitted to hospital: 3/31/2022 10:58 AM    Cervical myelopathy [facility])  Assessment & Plan  POD 14 posterior cervical evacuation of postoperative collection and debridement with placement of drains C3-T1 (Dr Jocelyne Melendrez, 4/1/2022)  · S/p Anterior cervical discectomy and fixation fusion C5/6 and C6/7; Posterior cervical decompression and instrumented fusion C3-T1 with Dr Jocelyne Melendrez on 3/11/22  · Presented as a direct admission from the office on 03/31 with concerns for wound infection    Imaging:   · CT Cervical w/ contrast 3/31/2022: Large posterior soft tissue fluid collection, morphology suspicious for infection as clinically suspected  Study is nondiagnostic for evaluation of the central canal and epidural abscess  MR is better suited intracanalicular evaluation although will be limited by artifact from metal hardware  Plan:   Continue to monitor neurological exam and symptoms   Blood Cx no growth after 5 days    Wound cx 3/31 + MRSA   Intraop cx tissue culture 4/1 + MRSA   Hull collar to remain in place at all times, okay for [place] collar for showering   MARCIN drains:  o Left MARCIN removed 4/8/2022  o Right MARCIN removed 4/6/2022  · SLIM and ID consulted - appreciate recommendations  · Patient will require 4 weeks of IV vancomycin followed by 4 weeks of p o  antibiotics  · PICC in place  · Will now be on vancomycin 1 5mg q24 hours, script provided to CM, patient to get final dose today at 1730 and dc home after this  · Continue to monitor incision  · Pain well controlled on current pain regimen:  · Gabapentin 100mg daily and 300mg at HS  · Tylenol 975 mg TID prn  · Robaxin 750 mg q 6 hours p r n   · Oxycodone 2 5-5mg q 4 hours p r n   For moderate or severe pain  · Bowel regimen: Senokot, and MiraLax  · Mobilize with PT/OT  Home with outpatient rehab  · Mobilize as tolerated  · DVT ppx: SCDs, on coumadin with therapeutic INR  · Confirmed with CM that home health is set up for patient, scripts for meds given to [facility]    Neurosurgery will follow as primary  He is stable for dc at this time  Outpatient follow up in about 2 and 4 weeks for an incision check and in 6 weeks with MD and upright imaging  Please call with questions or concerns  Factor V Leiden mutation [facility])  Assessment & Plan  · Heparin gtt d/c'd in setting of therapeutic INR  · No active signs of bleeding, continue to monitor incision  · Restarted on home dose of coumadin, 2 5mg daily except for Wed and Sat when he will take 5mg  · INR 2 06 on 4/14  · Will need recheck of INR 5-7 days after discharge with PCP - discussed with SLIM and wife Janet Monroy, they will follow up Monday for this  · SLIM signing off, he is okay to dc from their standpoint    * Surgical site infection  Assessment & Plan  See plan above    Subjective/Objective   Chief Complaint: "I am better today"    Subjective:  Patient states he is better today, feels he is in a better mindset, ready to go home  Wife is on the phone in room and she is eager to have him home  Want to make sure incision is okay, noted to be purple around the incision (similar to pictures noted in media tab)  No other complaints at this time    Nursing rounds completed with Paulie Lara - no significant overnight events    Objective:  Sitting in bed, collar in place, NAD    I/O       04/13 0701 04/14 0700 04/14 0701  04/15 0700 04/15 0701  04/16 0700    P  O  600 240 480    Total Intake(mL/kg) 600 (5 6) 240 (2 1) 480 (4 2)    Urine (mL/kg/hr) 600 (0 2) 350 (0 1)     Total Output 600 350     Net 0 -110 +480                 Invasive Devices  Report    Peripherally Inserted Central Catheter Line            PICC Line 08/75/08 Right Basilic 13 days                Physical Exam:  Vitals: Blood pressure 136/61, pulse 57, temperature 97 7 °F (36 5 °C), resp  rate 19, height 5' 11" (1 803 m), weight 113 kg (249 lb 5 4 oz), SpO2 93 %  ,Body mass index is 34 78 kg/m²  General appearance: alert, appears stated age, cooperative and no distress  Head: Normocephalic, without obvious abnormality, atraumatic  Eyes:  Conjugate gaze  Neck: supple, symmetrical, trachea midline, collar in place, incision clean dry intact, no discharge appreciated, dusky red around incision, blanchable, appears stable to Imaging in media tab, drain sutures x2 removed  Back: no kyphosis present, no tenderness to percussion or palpation  Lungs: non labored breathing  Heart: regular heart rate  Neurologic:   Mental status: Alert, oriented x3, follows commands, thought content appropriate  Cranial nerves: grossly intact (Cranial nerves II-XII)  Sensory: normal to light touch  Motor: moving all extremities without focal weakness strength 5/5 throughout  Coordination: finger to nose normal bilaterally, no drift bilaterally      Lab Results:  Results from last 7 days   Lab Units 04/14/22  0545 04/13/22  0538 04/11/22  0440   WBC Thousand/uL 6 50 6 82 6 96   HEMOGLOBIN g/dL 7 6* 7 7* 7 7*   HEMATOCRIT % 23 7* 23 9* 23 8*   PLATELETS Thousands/uL 263 271 276   NEUTROS PCT % 58 60 60   MONOS PCT % 12 11 11     Results from last 7 days   Lab Units 04/14/22  1114 04/10/22  0501   POTASSIUM mmol/L 4 2 3 8   CHLORIDE mmol/L 107 105   CO2 mmol/L 28 29   BUN mg/dL 16 12   CREATININE mg/dL 1 10 0 88   CALCIUM mg/dL 9 1 8 5             Results from last 7 days   Lab Units 04/14/22  0545 04/13/22  0538 04/12/22  0633 04/12/22  0236 04/11/22 2024 04/11/22  0440   INR  2 06* 2 06* 1 75*  --   --    < >   PTT seconds  --  104*  --  76* 67*  --     < > = values in this interval not displayed  Imaging Studies: I have personally reviewed pertinent reports     and I have personally reviewed pertinent films in PACS    CT spine cervical w contrast    Result Date: 3/31/2022  Impression: Large posterior soft tissue fluid collection, morphology suspicious for infection as clinically suspected  Study is nondiagnostic for evaluation of the central canal and epidural abscess  MR is better suited intracanalicular evaluation although will be limited by artifact from metal hardware  No specific findings of osteomyelitis  Workstation performed: [id number]       EKG, Pathology, and Other Studies: I have personally reviewed pertinent reports  VTE Pharmacologic Prophylaxis: eliquis    VTE Mechanical Prophylaxis: sequential compression device          Medical Problems             Resolved Problems  Date Reviewed: 4/15/2022          Resolved    Headache 4/10/2022     Resolved by  Julissa Polanco PA-C                Discharge Date:  04/15/2022    Admitting Diagnosis: Infection [B99 9]  Cervical myelopathy ([facility] [address] ) [G95 9]  Factor V Leiden mutation ([facility] [address] ) [D68 51]  Wound infection [T14  8XXA, L08 9]  Warfarin anticoagulation [Z79 01]  Paroxysmal A-fib ([facility] [address] ) [I48 0]    Discharge Diagnosis:   1  Cervical myelopathy   2  Status post posterior cervical evacuation of postoperative collection and debridement with placement of drains from C3-T1   3  Status post anterior cervical diskectomy and fixation fusion C5-6 and C6-7 with posterior cervical decompression and instrumented fusion C3-T1  4  Factor 5 Leiden   5  Surgical site infection    Attending:  Lin Burns MD    Consulting Physician(s):  Orthopedics, Internal Medicine, Infectious Disease, anesthesia    Procedures Performed: Posterior cervical evacuation of postoperative collection and debridement with placement of drains C3-T1    Radiology:  XR foot 3+ vw right   Final Result      Moderate 1st metatarsophalangeal arthritis; gout cannot be excluded        Workstation performed: [id number]         CT spine cervical w contrast   Final Result      Large posterior soft tissue fluid collection, morphology suspicious for infection as clinically suspected  Study is nondiagnostic for evaluation of the central canal and epidural abscess  MR is better suited intracanalicular evaluation although will be limited by artifact from metal hardware  No specific findings of osteomyelitis  Workstation performed: [id number]         XR spine cervical 2 or 3 vw injury    (Results Pending)       Hospital Course:  Jessica Washington is a 72 y/o male who presented to the outpatient office complaining of surgical site infection in the setting of cervical 360 fixation fusion on 3/11/2022  He was direct admitted from the office for wash out  Imaging revealed large posterior soft tissue fluid collection, morphology cyst suspicious for infection as clinically suspected  Patient underwent Posterior cervical evacuation of postoperative collection and debridement with placement of drains C3-T1 under general anesthesia with minimal blood loss and no complications  Patient was kept in the PACU until stable and then moved to the floor  Several specialties were on board for patient's care  He was being followed by infectious disease for antibiotic treatment, ultimately he will be sent home on IV vancomycin 1 5 g acute 24 hours for about 4 weeks with transition to oral treatment after that  Internal Medicine was on board for transition back onto Coumadin in the setting of factor 5 Leiden  He was started on a heparin drip, transition to Coumadin with therapeutic INR, maintained on his home dose of 2 5 mg daily except for 5 mg on Wednesdays and Saturdays  He was also seen by orthopedic team secondary to presumed gout in his right toe, medical management provided  Drain x 2 was placed perioperatively and removed on 04/06/2022 and 04/08/2022; patient tolerated well  PT and OT were consulted and recommend home with outpatient rehab  Patient was cleared medically for discharge on postop day 14  Prior to discharge, postoperative instructions were discussed with patient  During that time, all questions and concerns were addressed  Patient will follow up outpatient in 2 and 4 weeks for an incision check and 6 weeks with repeat imaging  Condition at Discharge: good     Discharge instructions/Information to patient and family:   See after visit summary for information provided to patient and family  Provisions for Follow-Up Care:  See after visit summary for information related to follow-up care and any pertinent home health orders  Disposition: Home        Planned Readmission: No    Discharge Statement   I spent 25 minutes discharging the patient  This time was spent on the day of discharge  I had direct contact with the patient on the day of discharge  Additional documentation is required if more than 30 minutes were spent on discharge  Discharge Medications:  See after visit summary for reconciled discharge medications provided to patient and family

## 2022-04-15 NOTE — PROGRESS NOTES
[address] [facility]  Progress Note - Alvera Adjutant 1950, 71 y o  male MRN: [id number]  Unit/Bed#: [facility] 890-32 Encounter: [id number]  Primary Care Provider: Diana Gardner MD   Date and time admitted to hospital: 3/31/2022 10:58 AM    SLIM TO SIGN OFF  PATIENT AWARE OF NEED FOR REPEAT INR IN FEW DAYS WITH FURTHER MONITORING  TO GO BACK ON HIS USUAL HOME WARFARIN DOSE, 2 5 MG DAILY EXCEPT FOR WEDS/SATURDAY TAKES 5 MG DAILY  * Surgical site infection  Assessment & Plan  Hx of cervical myelopathy, s/p anterior cervical discectomy and fixation fusion C5/6 and C6/7; Posterior cervical decompression and instrumented fusion C3-T1 with Dr Yenifer Linn on 3/11/22  Discharged to [facility] for rehab on 3/18, then discharged home on 3/29  Noted to have increased redness and drainage at incision site, no improvement after 6 days of Keflex  Referred to ED from OP office visit for further management  · CT cervical spine: Large posterior soft tissue fluid collection, morphology suspicious for infection as clinically suspected  Study is nondiagnostic for evaluation of the central canal and epidural abscess  No specific findings of osteomyelitis  · Admitted under neurosurgery service  · Status post operative washout on 4/1   · ID following and managing antibiotics  · Wound cultures and OR cultures grew MRSA   · Currently on vancomycin, will need 4 weeks IV antibiotics followed by similar course of p o  Antibiotics, PICC line in place  · Blood cultures - no growth after 5 days    · Analgesics as needed   · PT/OT recommending outpatient rehabilitation once medically stable    Case management arranged home ABX    Cervical myelopathy [facility])  Assessment & Plan  Status post cervical spinal fusion on 3/11/22 as above   · See related plan     Factor V Leiden mutation [facility])  Assessment & Plan  With history DVT as well as hx of afib (as below)  · Maintained on Coumadin, home regimen 2 5 mg daily except Wednesday/Saturday takes 5 mg  · Has been bridged with Coumadin, discontinued IV heparin 4/13 as INR therapeutic at 2 06  · Monitor PT/INR, would recommend repeat INR as outpatient in few days following discharge, managed by PCP  Great toe pain, right  Assessment & Plan  Presumed gout right 1st MRP joint  Completed Mobic x 3 days  Uric acid level WNL  Seen by ortho, no intervention   · 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  · Symptomatically improved   · Avoid steroids given infection      Anemia  Assessment & Plan  Appears to be postop anemia since recent surgery in March, hemoglobin between 9-10 since that time and previously within normal limits   · Hemoglobin with slight downward trend from baseline following operative washout  No signs or symptoms of active bleeding   · folate and B12 WNL  · Low Fe level however elevated ferritin  · Hemoglobin is stable, monitor as needed  Mixed hyperlipidemia  Assessment & Plan  · Continue statin     Paroxysmal A-fib (HCC)  Assessment & Plan  Continue with home dose BB/Flecainide  · Continue coumadin as above, INR now at goal      WILL (obstructive sleep apnea)  Assessment & Plan  · CPAP qhs     Essential hypertension  Assessment & Plan  BP acceptable, monitor routinely   · Continue home dose Norvasc, metoprolol     Headache-resolved as of 4/10/2022  Assessment & Plan  · Resolved  VTE Pharmacologic Prophylaxis: VTE Score: 10 Moderate Risk (Score 3-4) - Pharmacological DVT Prophylaxis Ordered: lovenox-->warfarin   Patient Centered Rounds: I performed bedside rounds with nursing staff today  Discussions with Specialists or Other Care Team Provider: NSx    Education and Discussions with Family / Patient: updates per primary   Time Spent for Care: 30 minutes  More than 50% of total time spent on counseling and coordination of care as described above      Current Length of Stay: 15 day(s)  Current Patient Status: Inpatient   Certification Statement: The patient will continue to require additional inpatient hospital stay due to stable per SLIM   Discharge Plan: [facility] is following this patient on consult  They are medically stable for discharge when deemed appropriate by primary service  Code Status: Level 1 - Full Code    Subjective:   Doing fine  Wants to go home  Objective:     Vitals:   Temp (24hrs), Av 8 °F (36 6 °C), Min:97 7 °F (36 5 °C), Max:97 9 °F (36 6 °C)    Temp:  [97 7 °F (36 5 °C)-97 9 °F (36 6 °C)] 97 7 °F (36 5 °C)  HR:  [57-62] 57  Resp:  [19] 19  BP: (136-157)/(61-79) 136/61  SpO2:  [90 %-93 %] 93 %  Body mass index is 34 78 kg/m²  Input and Output Summary (last 24 hours): Intake/Output Summary (Last 24 hours) at 4/15/2022 1048  Last data filed at 4/15/2022 0600  Gross per 24 hour   Intake 240 ml   Output 350 ml   Net -110 ml       Physical Exam:   Physical Exam  Vitals and nursing note reviewed  Constitutional:       Appearance: He is obese  Comments: In C collar    Cardiovascular:      Rate and Rhythm: Normal rate  Pulmonary:      Effort: No respiratory distress  Abdominal:      General: There is no distension  Tenderness: There is no abdominal tenderness  Musculoskeletal:      Right lower leg: No edema  Left lower leg: No edema  Neurological:      Mental Status: He is oriented to person, place, and time     Psychiatric:         Mood and Affect: Mood normal           Additional Data:     Labs:  Results from last 7 days   Lab Units 22  0545   WBC Thousand/uL 6 50   HEMOGLOBIN g/dL 7 6*   HEMATOCRIT % 23 7*   PLATELETS Thousands/uL 263   NEUTROS PCT % 58   LYMPHS PCT % 25   MONOS PCT % 12   EOS PCT % 2     Results from last 7 days   Lab Units 22  1114   SODIUM mmol/L 139   POTASSIUM mmol/L 4 2   CHLORIDE mmol/L 107   CO2 mmol/L 28   BUN mg/dL 16   CREATININE mg/dL 1 10   ANION GAP mmol/L 4   CALCIUM mg/dL 9 1   GLUCOSE RANDOM mg/dL 112     Results from last 7 days   Lab Units 22  0545   INR  2 06* Lines/Drains:  Invasive Devices  Report    Peripherally Inserted Central Catheter Line            PICC Line 54/31/86 Right Basilic 12 days                Central Line:  Goal for removal: N/A - Discharging with PICC for IV ABX/medications             Imaging: No pertinent imaging reviewed      Recent Cultures (last 7 days):         Last 24 Hours Medication List:   Current Facility-Administered Medications   Medication Dose Route Frequency Provider Last Rate    acetaminophen  975 mg Oral TID PRN Alison Biundo, FABIOLA      amLODIPine  5 mg Oral Daily Alison Biundo, FABIOLA      DULoxetine  60 mg Oral Daily Alison Biundo, PA-ALTAGRACIA      flecainide  100 mg Oral BID Alison Biundo, FABIOLA      fluticasone  1 spray Each Nare Daily DEEPTHI Wisdom      gabapentin  100 mg Oral Daily Alison Biundo, FABIOLA      gabapentin  300 mg Oral HS Alison Biundo, FABIOLA      methocarbamol  750 mg Oral Q6H PRN DEEPTHI Sanders      metoprolol succinate  100 mg Oral Daily Alison Biundo, FABIOLA      ondansetron  4 mg Intravenous Q6H PRN Alison Biundo, FABIOLA      oxyCODONE  2 5 mg Oral Q4H PRN Alison Biundo, FABIOLA      oxyCODONE  5 mg Oral Q4H PRN Alison Biundo, PA-ALTAGRACIA      polyethylene glycol  17 g Oral Daily PRN DEEPTHI Weems      potassium chloride  40 mEq Oral Daily Alison Biundo, FABIOLA      pravastatin  40 mg Oral Daily Alison Biundo, FABIOLA      senna  8 6 mg Oral Daily Alison Biundo, FABIOLA      tamsulosin  0 4 mg Oral Daily With FABIOLA Pradhan      vancomycin  1,500 mg Intravenous Q24H Bo Amaya MD      warfarin  2 5 mg Oral Once per day on Sun Mon Tue Thu Fri DEEPTHI Wisdom      [START ON 4/16/2022] warfarin  5 mg Oral Weekly DEEPTHI Wisdom      [START ON 4/20/2022] warfarin  5 mg Oral Weekly DEEPTHI Tamez          Today, Patient Was Seen By: Mortimer Barrs, PA-C    **Please Note: This note may have been constructed using a voice recognition system  **

## 2022-04-15 NOTE — ASSESSMENT & PLAN NOTE
With history DVT as well as hx of afib (as below)  · Maintained on Coumadin, home regimen 2 5 mg daily except Wednesday/Saturday takes 5 mg  · Has been bridged with Coumadin, discontinued IV heparin 4/13 as INR therapeutic at 2 06  · Monitor PT/INR, would recommend repeat INR as outpatient in few days following discharge, managed by PCP

## 2022-04-15 NOTE — PROGRESS NOTES
Vancomycin IV Pharmacy-to-Dose Consultation    Luiz Stiles is a 71 y o  male who is currently receiving Vancomycin IV with management by the Pharmacy Consult service  Assessment/Plan:  The patient was reviewed  Renal function is stable and no signs or symptoms of nephrotoxicity and/or infusion reactions were documented in the chart  Based on todays assessment, continue current vancomycin (day # 13) dosing of 1500mg iv q24h, with a plan for trough to be drawn at 1715 on 4/18  We will continue to follow the patients culture results and clinical progress daily      Denise Mohan, Pharmacist

## 2022-04-15 NOTE — DISCHARGE INSTRUCTIONS
Discharge Instructions  Posterior cervical decompression and fixation/fusion      Surgical incisional care:   Keep incision clean and dry  Avoid applying creams, lotion or antiseptic to incision area   Check the wound daily  If the incision becomes red, swollen, tender, warm, or has increased drainage please notify physician immediately   Okay to apply a padded dressing over incision while wearing VISTA collar in order to reduce risk of irritation   May shower 3 days after surgery, but do not soak in a tub and no swimming  o Use mild antimicrobial soap and water  Pat incision dry after showering   Cervical VISTA collar to be worn at all times except for showering  Change from VISTA (grey) collar to the [facility] [place] (peach) collar prior to showering  Incision may be cleaned with water and a mild antimicrobial soap using a clean washcloth  Incision is to be gently patted dry with a clean towel  Once dry, collar should be changed back to a VISTA (grey) collar with clean pads in place   Hand wash collar pads with mild soap and water  They are to be laid flat to dry on a clean towel  Recommend changing every 1-2 days   Please refer to VISTA collar instructions for further details  Activity Restrictions:   No heavy lifting greater than 5 - 10lbs  No strenuous activities   May walk as tolerated  Encourage at least 4 short walks per day   No driving while requiring cervical collar, anticipated six weeks   No significant neck movement  Postoperative medication:   Please take pain medications to relieve incision pain, and muscle relaxants to prevent spasms as directed  Please see after visit summary (AVS) for details   Please take over the counter stool softeners such as colace or senna-s to avoid constipation while on narcotics   Do not take ibuprofen, Naproxen/Aleve or any NSAID until cleared by surgeon  May take Tylenol instead     If taking Coumadin, Aspirin, or Plavix, you may resume these medications when cleared by Neurosurgery  Follow-up as scheduled for a 2 week incision check  Follow-up 6 weeks after surgery with a repeat cervical spine upright x-rays to be completed prior to visit  **Please notify MD immediately if you experience a fever of 101  F, have increased neck or arm pain, new numbness and/or weakness in your arms, difficulty swallowing or breathing especially while lying down, numbness or weakness in arms or legs  **

## 2022-04-15 NOTE — ASSESSMENT & PLAN NOTE
POD 14 posterior cervical evacuation of postoperative collection and debridement with placement of drains C3-T1 (Dr Juventino Wallace, 3/31/2022)  · S/p Anterior cervical discectomy and fixation fusion C5/6 and C6/7; Posterior cervical decompression and instrumented fusion C3-T1 with Dr Juventino Wallace on 3/11/22  · Presented as a direct admission from the office on 03/31 with concerns for wound infection    Imaging:   · CT Cervical w/ contrast 3/31/2022: Large posterior soft tissue fluid collection, morphology suspicious for infection as clinically suspected  Study is nondiagnostic for evaluation of the central canal and epidural abscess  MR is better suited intracanalicular evaluation although will be limited by artifact from metal hardware  Plan:   Continue to monitor neurological exam and symptoms   Blood Cx no growth after 5 days    Wound cx 3/31 + MRSA   Intraop cx tissue culture 4/1 + MRSA   [place] collar to remain in place at all times, okay for [place] collar for showering   MARCIN drains:  o Left MARCIN removed 4/8/2022  o Right MARCIN removed 4/6/2022  · SLIM and ID consulted - appreciate recommendations  · Patient will require 4 weeks of IV vancomycin followed by 4 weeks of p o  antibiotics  · PICC in place  · Will now be on vancomycin 1 5mg q24 hours, script provided to CM, patient to get final dose today at 1730 and dc home after this  · Continue to monitor incision  · Pain well controlled on current pain regimen:  · Gabapentin 100mg daily and 300mg at HS  · Tylenol 975 mg TID prn  · Robaxin 750 mg q 6 hours p r n   · Oxycodone 2 5-5mg q 4 hours p r n  For moderate or severe pain  · Bowel regimen: Senokot, and MiraLax  · Mobilize with PT/OT  Home with outpatient rehab  · Mobilize as tolerated  · DVT ppx: SCDs, on coumadin with therapeutic INR  · Confirmed with CM that home health is set up for patient, scripts for meds given to [place]    Neurosurgery will follow as primary  He is stable for dc at this time  Outpatient follow up in about 2 weeks for an incision check and in 4 weeks with MD and upright imaging  Please call with questions or concerns

## 2022-04-15 NOTE — ASSESSMENT & PLAN NOTE
· Heparin gtt d/c'd in setting of therapeutic INR  · No active signs of bleeding, continue to monitor incision  · Restarted on home dose of coumadin, 2 5mg daily except for Wed and Sat when he will take 5mg  · INR 2 06 on 4/14  · Will need recheck of INR 5-7 days after discharge with PCP - discussed with PATY and wife Noe Chew, they will follow up Monday for this  · PATY signing off, he is okay to dc from their standpoint

## 2022-04-15 NOTE — PLAN OF CARE
Problem: PAIN - ADULT  Goal: Verbalizes/displays adequate comfort level or baseline comfort level  Description: Interventions:  - Encourage patient to monitor pain and request assistance  - Assess pain using appropriate pain scale  - Administer analgesics based on type and severity of pain and evaluate response  - Implement non-pharmacological measures as appropriate and evaluate response  - Consider cultural and social influences on pain and pain management  - Notify physician/advanced practitioner if interventions unsuccessful or patient reports new pain  Outcome: Progressing     Problem: INFECTION - ADULT  Goal: Absence or prevention of progression during hospitalization  Description: INTERVENTIONS:  - Assess and monitor for signs and symptoms of infection  - Monitor lab/diagnostic results  - Monitor all insertion sites, i e  indwelling lines, tubes, and drains  - Monitor endotracheal if appropriate and nasal secretions for changes in amount and color  - [place] appropriate cooling/warming therapies per order  - Administer medications as ordered  - Instruct and encourage patient and family to use good hand hygiene technique  - Identify and instruct in appropriate isolation precautions for identified infection/condition  Outcome: Progressing  Goal: Absence of fever/infection during neutropenic period  Description: INTERVENTIONS:  - Monitor WBC    Outcome: Progressing     Problem: SAFETY ADULT  Goal: Patient will remain free of falls  Description: INTERVENTIONS:  - Educate patient/family on patient safety including physical limitations  - Instruct patient to call for assistance with activity   - Consult OT/PT to assist with strengthening/mobility   - Keep Call bell within reach  - Keep bed low and locked with side rails adjusted as appropriate  - Keep care items and personal belongings within reach  - Initiate and maintain comfort rounds  - Make Fall Risk Sign visible to staff  - Offer Toileting   - Obtain necessary fall risk management equipment  - Apply yellow socks and bracelet for high fall risk patients  - Consider moving patient to room near nurses station  Outcome: Progressing  Goal: Maintain or return to baseline ADL function  Description: INTERVENTIONS:  -  Assess patient's ability to carry out ADLs; assess patient's baseline for ADL function and identify physical deficits which impact ability to perform ADLs (bathing, care of mouth/teeth, toileting, grooming, dressing, etc )  - Assess/evaluate cause of self-care deficits   - Assess range of motion  - Assess patient's mobility; develop plan if impaired  - Assess patient's need for assistive devices and provide as appropriate  - Encourage maximum independence but intervene and supervise when necessary  - Involve family in performance of ADLs  - Assess for home care needs following discharge   - Consider OT consult to assist with ADL evaluation and planning for discharge  - Provide patient education as appropriate  Outcome: Progressing  Goal: Maintains/Returns to pre admission functional level  Description: INTERVENTIONS:  - Perform BMAT or MOVE assessment daily    - Set and communicate daily mobility goal to care team and patient/family/caregiver     - Collaborate with rehabilitation services on mobility goals if consulted  - Out of bed for toileting  - Record patient progress and toleration of activity level   Outcome: Progressing     Problem: DISCHARGE PLANNING  Goal: Discharge to home or other facility with appropriate resources  Description: INTERVENTIONS:  - Identify barriers to discharge w/patient and caregiver  - Arrange for needed discharge resources and transportation as appropriate  - Identify discharge learning needs (meds, wound care, etc )  - Arrange for interpretive services to assist at discharge as needed  - Refer to Case Management Department for coordinating discharge planning if the patient needs post-hospital services based on physician/advanced practitioner order or complex needs related to functional status, cognitive ability, or social support system  Outcome: Progressing     Problem: Knowledge Deficit  Goal: Patient/family/caregiver demonstrates understanding of disease process, treatment plan, medications, and discharge instructions  Description: Complete learning assessment and assess knowledge base    Interventions:  - Provide teaching at level of understanding  - Provide teaching via preferred learning methods  Outcome: Progressing     Problem: Potential for Falls  Goal: Patient will remain free of falls  Description: INTERVENTIONS:  - Educate patient/family on patient safety including physical limitations  - Instruct patient to call for assistance with activity   - Consult OT/PT to assist with strengthening/mobility   - Keep Call bell within reach  - Keep bed low and locked with side rails adjusted as appropriate  - Keep care items and personal belongings within reach  - Initiate and maintain comfort rounds  - Make Fall Risk Sign visible to staff  - Offer [address] necessary fall risk management equipment  - Apply yellow socks and bracelet for high fall risk patients  - Consider moving patient to room near nurses station  Outcome: Progressing     Problem: MOBILITY - ADULT  Goal: Maintain or return to baseline ADL function  Description: INTERVENTIONS:  -  Assess patient's ability to carry out ADLs; assess patient's baseline for ADL function and identify physical deficits which impact ability to perform ADLs (bathing, care of mouth/teeth, toileting, grooming, dressing, etc )  - Assess/evaluate cause of self-care deficits   - Assess range of motion  - Assess patient's mobility; develop plan if impaired  - Assess patient's need for assistive devices and provide as appropriate  - Encourage maximum independence but intervene and supervise when necessary  - Involve family in performance of ADLs  - Assess for home care needs following discharge   - Consider OT consult to assist with ADL evaluation and planning for discharge  - Provide patient education as appropriate  Outcome: Progressing  Goal: Maintains/Returns to pre admission functional level  Description: INTERVENTIONS:  - Perform BMAT or MOVE assessment daily    - Set and communicate daily mobility goal to care team and patient/family/caregiver     - Collaborate with rehabilitation services on mobility goals if consulted  - Out of bed for toileting  - Record patient progress and toleration of activity level   Outcome: Progressing     Problem: Prexisting or High Potential for Compromised Skin Integrity  Goal: Skin integrity is maintained or improved  Description: INTERVENTIONS:  - Identify patients at risk for skin breakdown  - Assess and monitor skin integrity  - Assess and monitor nutrition and hydration status  - Monitor labs   - Assess for incontinence   - Turn and reposition patient  - Assist with mobility/ambulation  - Relieve pressure over bony prominences  - Avoid friction and shearing  - Provide appropriate hygiene as needed including keeping skin clean and dry  - Evaluate need for skin moisturizer/barrier cream  - Collaborate with interdisciplinary team   - Patient/family teaching  - Consider wound care consult   Outcome: Progressing     Problem: MUSCULOSKELETAL - ADULT  Goal: Maintain or return mobility to safest level of function  Description: INTERVENTIONS:  - Assess patient's ability to carry out ADLs; assess patient's baseline for ADL function and identify physical deficits which impact ability to perform ADLs (bathing, care of mouth/teeth, toileting, grooming, dressing, etc )  - Assess/evaluate cause of self-care deficits   - Assess range of motion  - Assess patient's mobility  - Assess patient's need for assistive devices and provide as appropriate  - Encourage maximum independence but intervene and supervise when necessary  - Involve family in performance of ADLs  - Assess for home care needs following discharge   - Consider OT consult to assist with ADL evaluation and planning for discharge  - Provide patient education as appropriate  Outcome: Progressing  Goal: Maintain proper alignment of affected body part  Description: INTERVENTIONS:  - Support, maintain and protect limb and body alignment  - Provide patient/ family with appropriate education  Outcome: Progressing     Problem: Nutrition/Hydration-ADULT  Goal: Nutrient/Hydration intake appropriate for improving, restoring or maintaining nutritional needs  Description: Monitor and assess patient's nutrition/hydration status for malnutrition  Collaborate with interdisciplinary team and initiate plan and interventions as ordered  Monitor patient's weight and dietary intake as ordered or per policy  Utilize nutrition screening tool and intervene as necessary  Determine patient's food preferences and provide high-protein, high-caloric foods as appropriate       INTERVENTIONS:  - Monitor oral intake, urinary output, labs, and treatment plans  - Assess nutrition and hydration status and recommend course of action  - Evaluate amount of meals eaten  - Assist patient with eating if necessary   - Allow adequate time for meals  - Recommend/ encourage appropriate diets, oral nutritional supplements, and vitamin/mineral supplements  - Order, calculate, and assess calorie counts as needed  - Recommend, monitor, and adjust tube feedings and TPN/PPN based on assessed needs  - Assess need for intravenous fluids  - Provide specific nutrition/hydration education as appropriate  - Include patient/family/caregiver in decisions related to nutrition  Outcome: Progressing

## 2022-04-15 NOTE — ASSESSMENT & PLAN NOTE
Hx of cervical myelopathy, s/p anterior cervical discectomy and fixation fusion C5/6 and C6/7; Posterior cervical decompression and instrumented fusion C3-T1 with Dr Ace Felton on 3/11/22  Discharged to [facility] for rehab on 3/18, then discharged home on 3/29  Noted to have increased redness and drainage at incision site, no improvement after 6 days of Keflex  Referred to ED from OP office visit for further management  · CT cervical spine: Large posterior soft tissue fluid collection, morphology suspicious for infection as clinically suspected  Study is nondiagnostic for evaluation of the central canal and epidural abscess  No specific findings of osteomyelitis  · Admitted under neurosurgery service  · Status post operative washout on 4/1   · ID following and managing antibiotics  · Wound cultures and OR cultures grew MRSA   · Currently on vancomycin, will need 4 weeks IV antibiotics followed by similar course of p o  Antibiotics, PICC line in place  · Blood cultures - no growth after 5 days    · Analgesics as needed   · PT/OT recommending outpatient rehabilitation once medically stable    Case management arranged home ABX

## 2022-04-15 NOTE — PROGRESS NOTES
Progress Note - Infectious Disease   Nolan Arriaza 71 y o  male MRN: [id number]  Unit/Bed#: [facility] 608-01 Encounter: [id number]      Impression:  1  Postoperative MRSA cervical wound infection R/0 vertebral osteomyelitis S/P posterior cervical a VAC UA shin of postoperative collection and debridement with placement of drains C3-T1 POD 13  2  S/P  anterior cervical diskectomy and fixation fusion C5-6 and C6-7 posterior cervical decompression and instrumented fusion C3-T1 on 22  3  History of PAF on Coumadin  4  Factor 5 Leiden mutation  5  WILL    Recommendations:  Afebrile with normal WBC count   Hemoglobin is 7 6, Creatinine is normal and CRP is 42 6 when last taken  Wound dressing is dry erythema is unchanged fand does not appear acute  There is no tenderness or areas of fluctuance  He had a good night sleep yesterday and is feeling much better without headache or significant pain  He asked for a COVID test and this was negative       1  Final cultures are showing MRSA  2  Continue vancomycin IV  As per Pharm pharmacy dose adjusted to 1 5 g Q 24 hours IV beginning  at 1730 hours  Patient will be discharged after dose complete  3  Would anticipate at least a four-week IV vancomycin course followed by approximately 4 weeks of p o  Rx   4  Patient will be followed as outpatient by neurosurgery and in ID offfice  5  Will need weekly CBC, diff, CRP and BMP  6  Rx for OP vancomycin and labs given to   Have added vancomycin trough for tomorrow             Antibiotics:  1  Vancomycin 1 5 gm q 24 hours IV, day 15 of  Rx    Subjective:  Feeling much better today and is eager to go home  Denies fevers, chills, or sweats  Denies nausea, vomiting, or diarrhea        Objective:  Vitals:  Temp:  [97 7 °F (36 5 °C)-97 9 °F (36 6 °C)] 97 7 °F (36 5 °C)  HR:  [57-62] 57  Resp:  [19] 19  BP: (136-157)/(61-79) 136/61  SpO2:  [90 %-93 %] 93 %  Temp (24hrs), Av 8 °F (36 6 °C), Min:97 7 °F (36 5 °C), Max:97 9 °F (36 6 °C)  Current: Temperature: 97 7 °F (36 5 °C)    Physical Exam:     General Appearance:  Alert, nontoxic, no acute distress  [place]ista collar in place  Posterior cervical wound with former MARCIN drain sites clean and dry   Erythema that surrounds the wound appears is not acute appearing  No point tenderness or discharge  Appears comfortable sitting in chair   Throat: Oropharynx moist without lesions  Lips, mucosa, and tongue normal   Neck: [place] collar with posterior cervical wound with staples and retention sutures  Non tender     Dressing is dry  1+ surrounding erythema as per my direct exam   Lungs:   Clear to auscultation bilaterally, no audible wheezes, rhonchi or rales; respirations unlabored   Heart:  Regular rate and rhythm, S1, S2 normal, no murmur, rub or gallop   Abdomen:   Soft, non-tender, surgical scars non-distended, positive bowel sounds  No masses, no organomegaly    No CVA tenderness   Extremities: Reduced swelling right 1st MTP joint, no clubbing, cyanosis or edema  PICC line right basilic   Skin: As above, surgical scar  Invasive Devices  Report    Peripherally Inserted Central Catheter Line            PICC Line 38/14/80 Right Basilic 13 days                Labs, Imaging, & Other studies:   All pertinent labs were personally reviewed  Results from last 7 days   Lab Units 04/14/22  0545 04/13/22  0538 04/11/22  0440   WBC Thousand/uL 6 50 6 82 6 96   HEMOGLOBIN g/dL 7 6* 7 7* 7 7*   PLATELETS Thousands/uL 263 271 276     Results from last 7 days   Lab Units 04/14/22  1114 04/10/22  0501 04/10/22  0501   SODIUM mmol/L 139  --  139   POTASSIUM mmol/L 4 2   < > 3 8   CHLORIDE mmol/L 107   < > 105   CO2 mmol/L 28   < > 29   BUN mg/dL 16   < > 12   CREATININE mg/dL 1 10   < > 0 88   EGFR ml/min/1 73sq m 67   < > 86   CALCIUM mg/dL 9 1   < > 8 5    < > = values in this interval not displayed

## 2022-04-15 NOTE — ASSESSMENT & PLAN NOTE
POD 14 posterior cervical evacuation of postoperative collection and debridement with placement of drains C3-T1 (Dr Jocelyne Melendrez, 4/1/2022)  · S/p Anterior cervical discectomy and fixation fusion C5/6 and C6/7; Posterior cervical decompression and instrumented fusion C3-T1 with Dr Jocelyne Melendrez on 3/11/22  · Presented as a direct admission from the office on 03/31 with concerns for wound infection    Imaging:   · CT Cervical w/ contrast 3/31/2022: Large posterior soft tissue fluid collection, morphology suspicious for infection as clinically suspected  Study is nondiagnostic for evaluation of the central canal and epidural abscess  MR is better suited intracanalicular evaluation although will be limited by artifact from metal hardware  Plan:   Continue to monitor neurological exam and symptoms   Blood Cx no growth after 5 days    Wound cx 3/31 + MRSA   Intraop cx tissue culture 4/1 + MRSA   [place] collar to remain in place at all times, okay for [place] collar for showering   MARCIN drains:  o Left MARCIN removed 4/8/2022  o Right MARCIN removed 4/6/2022  · SLIM and ID consulted - appreciate recommendations  · Patient will require 4 weeks of IV vancomycin followed by 4 weeks of p o  antibiotics  · PICC in place  · Will now be on vancomycin 1 5mg q24 hours, script provided to CM, patient to get final dose today at 1730 and dc home after this  · Continue to monitor incision  · Pain well controlled on current pain regimen:  · Gabapentin 100mg daily and 300mg at HS  · Tylenol 975 mg TID prn  · Robaxin 750 mg q 6 hours p r n   · Oxycodone 2 5-5mg q 4 hours p r n  For moderate or severe pain  · Bowel regimen: Senokot, and MiraLax  · Mobilize with PT/OT  Home with outpatient rehab  · Mobilize as tolerated  · DVT ppx: SCDs, on coumadin with therapeutic INR  · Confirmed with CM that home health is set up for patient, scripts for meds given to [facility]    Neurosurgery will follow as primary  He is stable for dc at this time    Outpatient follow up in about 2 and 4 weeks for an incision check and in 6 weeks with MD and upright imaging  Please call with questions or concerns

## 2022-04-15 NOTE — ASSESSMENT & PLAN NOTE
· Heparin gtt d/c'd in setting of therapeutic INR  · No active signs of bleeding, continue to monitor incision  · Restarted on home dose of coumadin, 2 5mg daily except for Wed and Sat when he will take 5mg  · INR 2 06 on 4/14  · Will need recheck of INR 5-7 days after discharge with PCP - discussed with PATY and wife Santhosh Finley, they will follow up Monday for this  · PATY signing off, he is okay to dc from their standpoint

## 2022-04-15 NOTE — CASE MANAGEMENT
Case Management Discharge Planning Note    Patient name Asiya George  Location [facility] 608/[facility] 707-52 MRN [id number]  : 1950 Date 4/15/2022       Current Admission Date: 3/31/2022  Current Admission Diagnosis:Surgical site infection   Patient Active Problem List    Diagnosis Date Noted    Great toe pain, right 04/10/2022    Surgical site infection 2022    Status post cervical spinal fusion 2022    Anemia 2022    Head pain cephalgia 2022    Hyponatremia 2022    Muscle spasm 2022    Goals of care, counseling/discussion 2022    Sinus bradycardia 03/10/2022    Cervical myelopathy ([facility] ) 2022    Weakness 2022    Pes anserinus bursitis of right knee 2021    IFG (impaired fasting glucose) 2021    History of DVT of lower extremity 2021    Mixed hyperlipidemia 10/27/2020    Paroxysmal A-fib ([facility] [facility] ) 2020    Lower extremity edema 2020    Primary osteoarthritis of left knee 2020    Primary osteoarthritis of right knee 2020    Depression, major, single episode, moderate ([address] ) 10/30/2017    Insomnia 03/10/2017    Lumbar herniated disc 03/10/2017    Erectile dysfunction 2016    Status post catheter ablation of atrial flutter 2015    Essential hypertension 2015    WILL (obstructive sleep apnea) 2015    Factor V Leiden mutation ([facility] ) 2014      LOS (days): 15  Geometric Mean LOS (GMLOS) (days): 5 40  Days to GMLOS:-9 5     OBJECTIVE:  Risk of Unplanned Readmission Score: 27         Current admission status: Inpatient   Preferred Pharmacy:   [address] [facility] [address]  [address]  [address] [address]  Phone: [phone] Fax: [phone]    [facility]/pharmacy #[id number] [address]  Phone: [phone] Fax: [phone]    [facility] Provider: Berto Spear MD    Primary Insurance: Vinny Motta [facility]  Secondary Insurance:     DISCHARGE DETAILS:    Discharge planning discussed with[de-identified] Patient  Freedom of Choice: Yes  Comments - Freedom of Choice: Discussed FOC  CM contacted family/caregiver?: Yes  Were Treatment Team discharge recommendations reviewed with patient/caregiver?: Yes  Did patient/caregiver verbalize understanding of patient care needs?: Yes  Were patient/caregiver advised of the risks associated with not following Treatment Team discharge recommendations?: Yes    Contacts  Patient Contacts: Ebony Cartwright  Relationship to Patient[de-identified] Family  Contact Method: Phone  Phone Number: [phone]  Reason/Outcome: Discharge Planning,Continuity of Care,Emergency Contact    Other Referral/Resources/Interventions Provided:  Referral Comments: Script for new vanco dose uploaded to [facility] states [facility] to deliver medication and supplies to home 4/16 in afternoon, Susana RODRIGUEZ, aware that [facility] to be 4/16 around 4:30 pm, with a vanco trough to be drawn, script uploaded to [facility]  Wife and patient aware, and in agreement with DCP  Patient to be discharged this evening after vanco dose   Son will  patient

## 2022-04-16 ENCOUNTER — APPOINTMENT (OUTPATIENT)
Dept: LAB | Facility: HOSPITAL | Age: 72
End: 2022-04-16
Attending: INTERNAL MEDICINE
Payer: COMMERCIAL

## 2022-04-16 ENCOUNTER — TELEPHONE (OUTPATIENT)
Dept: OTHER | Facility: OTHER | Age: 72
End: 2022-04-16

## 2022-04-16 ENCOUNTER — TRANSITIONAL CARE MANAGEMENT (OUTPATIENT)
Dept: FAMILY MEDICINE CLINIC | Facility: CLINIC | Age: 72
End: 2022-04-16

## 2022-04-16 DIAGNOSIS — Z86.718 HISTORY OF DVT OF LOWER EXTREMITY: Primary | ICD-10-CM

## 2022-04-16 DIAGNOSIS — I48.0 PAROXYSMAL A-FIB (HCC): ICD-10-CM

## 2022-04-16 DIAGNOSIS — N39.0 URINARY TRACT INFECTION WITH HEMATURIA, SITE UNSPECIFIED: ICD-10-CM

## 2022-04-16 DIAGNOSIS — G95.9 CERVICAL MYELOPATHY (HCC): ICD-10-CM

## 2022-04-16 DIAGNOSIS — R31.9 URINARY TRACT INFECTION WITH HEMATURIA, SITE UNSPECIFIED: ICD-10-CM

## 2022-04-16 DIAGNOSIS — D68.51 FACTOR V LEIDEN MUTATION (HCC): ICD-10-CM

## 2022-04-16 PROCEDURE — 80202 ASSAY OF VANCOMYCIN: CPT

## 2022-04-16 NOTE — TELEPHONE ENCOUNTER
Called to advise that the patient was admitted today to their services, he was just released from [facility]  He is on coumadin but there are no INR orders  Could orders please be faxed so they could draw that this upcoming week   Fax# [phone]

## 2022-04-17 ENCOUNTER — TELEPHONE (OUTPATIENT)
Dept: OTHER | Facility: OTHER | Age: 72
End: 2022-04-17

## 2022-04-17 NOTE — TELEPHONE ENCOUNTER
Vanco trough was ordered, but lab never processed the specimen  New specimen will be attained tomorrow

## 2022-04-18 ENCOUNTER — LAB (OUTPATIENT)
Dept: LAB | Facility: HOSPITAL | Age: 72
End: 2022-04-18
Payer: COMMERCIAL

## 2022-04-18 ENCOUNTER — TELEPHONE (OUTPATIENT)
Dept: OTHER | Facility: OTHER | Age: 72
End: 2022-04-18

## 2022-04-18 DIAGNOSIS — G95.9 CERVICAL MYELOPATHY (HCC): ICD-10-CM

## 2022-04-18 DIAGNOSIS — I48.0 PAROXYSMAL A-FIB (HCC): ICD-10-CM

## 2022-04-18 DIAGNOSIS — Z79.01 LONG TERM (CURRENT) USE OF ANTICOAGULANTS: ICD-10-CM

## 2022-04-18 DIAGNOSIS — Z86.718 HISTORY OF DVT OF LOWER EXTREMITY: ICD-10-CM

## 2022-04-18 DIAGNOSIS — Z45.2 FITTING AND ADJUSTMENT OF VASCULAR CATHETER: ICD-10-CM

## 2022-04-18 DIAGNOSIS — D68.51 FACTOR V LEIDEN MUTATION (HCC): ICD-10-CM

## 2022-04-18 DIAGNOSIS — Z79.2 ENCOUNTER FOR LONG-TERM (CURRENT) USE OF ANTIBIOTICS: ICD-10-CM

## 2022-04-18 LAB
ANION GAP SERPL CALCULATED.3IONS-SCNC: 5 MMOL/L (ref 4–13)
BASOPHILS # BLD AUTO: 0.1 THOUSANDS/ΜL (ref 0–0.1)
BASOPHILS NFR BLD AUTO: 2 % (ref 0–1)
BUN SERPL-MCNC: 26 MG/DL (ref 5–25)
CALCIUM SERPL-MCNC: 7.9 MG/DL (ref 8.3–10.1)
CHLORIDE SERPL-SCNC: 105 MMOL/L (ref 100–108)
CO2 SERPL-SCNC: 28 MMOL/L (ref 21–32)
CREAT SERPL-MCNC: 1.88 MG/DL (ref 0.6–1.3)
CRP SERPL QL: 21.8 MG/L
EOSINOPHIL # BLD AUTO: 0.15 THOUSAND/ΜL (ref 0–0.61)
EOSINOPHIL NFR BLD AUTO: 3 % (ref 0–6)
ERYTHROCYTE [DISTWIDTH] IN BLOOD BY AUTOMATED COUNT: 14.3 % (ref 11.6–15.1)
GFR SERPL CREATININE-BSD FRML MDRD: 35 ML/MIN/1.73SQ M
GLUCOSE SERPL-MCNC: 85 MG/DL (ref 65–140)
HCT VFR BLD AUTO: 25.5 % (ref 36.5–49.3)
HGB BLD-MCNC: 7.9 G/DL (ref 12–17)
IMM GRANULOCYTES # BLD AUTO: 0.03 THOUSAND/UL (ref 0–0.2)
IMM GRANULOCYTES NFR BLD AUTO: 1 % (ref 0–2)
INR PPP: 3.22 (ref 0.84–1.19)
LYMPHOCYTES # BLD AUTO: 1.44 THOUSANDS/ΜL (ref 0.6–4.47)
LYMPHOCYTES NFR BLD AUTO: 25 % (ref 14–44)
MCH RBC QN AUTO: 29.8 PG (ref 26.8–34.3)
MCHC RBC AUTO-ENTMCNC: 31 G/DL (ref 31.4–37.4)
MCV RBC AUTO: 96 FL (ref 82–98)
MONOCYTES # BLD AUTO: 0.71 THOUSAND/ΜL (ref 0.17–1.22)
MONOCYTES NFR BLD AUTO: 12 % (ref 4–12)
NEUTROPHILS # BLD AUTO: 3.45 THOUSANDS/ΜL (ref 1.85–7.62)
NEUTS SEG NFR BLD AUTO: 57 % (ref 43–75)
NRBC BLD AUTO-RTO: 0 /100 WBCS
PLATELET # BLD AUTO: 223 THOUSANDS/UL (ref 149–390)
PMV BLD AUTO: 9.9 FL (ref 8.9–12.7)
POTASSIUM SERPL-SCNC: 4.7 MMOL/L (ref 3.5–5.3)
PROTHROMBIN TIME: 32 SECONDS (ref 11.6–14.5)
RBC # BLD AUTO: 2.65 MILLION/UL (ref 3.88–5.62)
SODIUM SERPL-SCNC: 138 MMOL/L (ref 136–145)
VANCOMYCIN TROUGH SERPL-MCNC: 21.4 UG/ML (ref 10–20)
WBC # BLD AUTO: 5.88 THOUSAND/UL (ref 4.31–10.16)

## 2022-04-18 PROCEDURE — 85610 PROTHROMBIN TIME: CPT

## 2022-04-18 PROCEDURE — 86140 C-REACTIVE PROTEIN: CPT

## 2022-04-18 PROCEDURE — 36415 COLL VENOUS BLD VENIPUNCTURE: CPT

## 2022-04-18 PROCEDURE — 80048 BASIC METABOLIC PNL TOTAL CA: CPT

## 2022-04-18 PROCEDURE — 85025 COMPLETE CBC W/AUTO DIFF WBC: CPT

## 2022-04-18 PROCEDURE — 80202 ASSAY OF VANCOMYCIN: CPT

## 2022-04-18 NOTE — UTILIZATION REVIEW
Notification of Discharge   This is a Notification of Discharge from our facility [address]  Please be advised that this patient has been discharge from our facility  Below you will find the admission and discharge date and time including the patients disposition  UTILIZATION REVIEW CONTACT:  Pushpa Mendoza  Utilization   Network Utilization Review Department  Phone: [phone] x carefully listen to the prompts  All voicemails are confidential   Email: [email or web address]     PHYSICIAN ADVISORY SERVICES:  FOR AVCE-OD-DXHT REVIEW - MEDICAL NECESSITY DENIAL  Phone: [phone]  Fax: [phone]  Email: [email or web address]     PRESENTATION DATE: 3/31/2022 10:58 AM  OBERVATION ADMISSION DATE:   INPATIENT ADMISSION DATE: 3/31/22  2:35 PM   DISCHARGE DATE: 4/15/2022  6:58 PM  DISPOSITION: Home with [place] with [address] [place] [address] Road INFORMATION:  Send all requests for admission clinical reviews, approved or denied determinations and any other requests to dedicated fax number below belonging to the campus where the patient is receiving treatment   List of dedicated fax numbers:  [address] DENIALS (Administrative/Medical Necessity) [phone]   [address] (Maternity/NICU/Pediatrics) [phone]   [facility] [phone]   130 [facility] [phone]   [address] [place] [address] [phone]   [address] [phone]   [facility]  [phone]   [address] [place] [address], [facility]  2401 [facility] 1000 [facility] [phone]

## 2022-04-19 ENCOUNTER — TELEPHONE (OUTPATIENT)
Dept: INFECTIOUS DISEASES | Facility: CLINIC | Age: 72
End: 2022-04-19

## 2022-04-19 NOTE — TELEPHONE ENCOUNTER
Attempt again to call patient regarding the earlier note to get patient's vancomycin trough information regarding what times he doses  No answer      LM

## 2022-04-19 NOTE — TELEPHONE ENCOUNTER
Lab Result: Vancomycin Trough 21 4    Date/Time Drawn: 04/18/22 @ 3pm   Ordering Provider: Dr Cara Melo Name: Maryanne JOHNSON       The following critical/stat result was read back to the lab as stated above and [employer] to the on-call provider

## 2022-04-19 NOTE — TELEPHONE ENCOUNTER
LM for pt to CB  Received vancomycin trough elevated  However trough level on weekend was therapeutic  Calling to find out when patient doses to determine if true trough

## 2022-04-19 NOTE — TELEPHONE ENCOUNTER
Contacted Felix to try to find out patient's dosing time  Confirmed dose: Patient on 1 5g Q24  Attempt to reach pharmacist at [address] [place] [address],  placed me on hold as Anahy pharmacist was on the phone  After 20 min still unable to get pharmacist on the phone

## 2022-04-19 NOTE — TELEPHONE ENCOUNTER
Patient doses at 3 PM  Trough was drawn 60 minutes prior to dosing, per Praveena Tijerina  Per Dr Tg Pack, decrease to 1 25g Q24 with a trough to be drawn prior to 4th new dose  Verbalized this with Stuart Ramirez, pharmacist with [facility]  They will work on this  LM for pt to CB to discuss change

## 2022-04-20 ENCOUNTER — TELEPHONE (OUTPATIENT)
Dept: INFECTIOUS DISEASES | Facility: CLINIC | Age: 72
End: 2022-04-20

## 2022-04-20 ENCOUNTER — ANTICOAG VISIT (OUTPATIENT)
Dept: FAMILY MEDICINE CLINIC | Facility: CLINIC | Age: 72
End: 2022-04-20

## 2022-04-20 DIAGNOSIS — Z79.01 ANTICOAGULATED ON COUMADIN: ICD-10-CM

## 2022-04-20 DIAGNOSIS — D68.51 FACTOR V LEIDEN MUTATION (HCC): Primary | ICD-10-CM

## 2022-04-20 NOTE — TELEPHONE ENCOUNTER
Anahy Pharmacist from [facility] called she is very concerned about having the vanco trough drawn on Monday and mentioned that she discussed with nursing they are able to go out Sunday before or even Saturday before dose to ensure it is done  She stated that APRYL MARLEY is the one requesting a written order for a change in when to draw the vanco trough

## 2022-04-20 NOTE — PROGRESS NOTES
Per Dr Pam Tucker to find out current dose and have decrease and repeat  4/20/22 1604  Spoke to wife --  taking 5mg Wednesday & Saturday and 2 5mg all other days    Told to take half dose today which would be 2 5mg and will call tomorrow with further directions      MD Leatha Majnarrez MA  Call patient with lab result-looks like his renal function is down a little bit due to his vancomycin   I would have him c hold dose today   Take 2 5 on Saturday and get PT INR on Monday unless BUN creatinine is being check 1-2 days after that peer       4/21/22 1154  Patients wife notified as above  She will have [facility] draw a PT/INR when they are at house on either Sunday 4/24 or Monday 4/25

## 2022-04-21 NOTE — RESULT ENCOUNTER NOTE
Call patient with lab result-looks like his renal function is down a little bit due to his vancomycin  I would have him c hold dose today    Take 2 5 on Saturday and get PT INR on Monday unless BUN creatinine is being check 1-2 days after that peer

## 2022-04-21 NOTE — TELEPHONE ENCOUNTER
Patient's level was 21 7  We would prefer to address during the week, rather than making it for on call to handle as this is Dr Amy Sherman patient  OK for [facility] to draw all labs Sunday, however we do not want them to report the level  We will address that when we come in Monday  Discussed with Alea Banks at [facility] who is completely fine with this plan  Returned call to Robbie Crowder to let her know of new plan  Robbie Crowder thanks me for my call

## 2022-04-22 ENCOUNTER — HOSPITAL ENCOUNTER (INPATIENT)
Facility: HOSPITAL | Age: 72
LOS: 7 days | Discharge: HOME HEALTH | DRG: 698 | End: 2022-04-29
Attending: EMERGENCY MEDICINE | Admitting: STUDENT IN AN ORGANIZED HEALTH CARE EDUCATION/TRAINING PROGRAM
Payer: COMMERCIAL

## 2022-04-22 ENCOUNTER — TELEPHONE (OUTPATIENT)
Dept: INFECTIOUS DISEASES | Facility: CLINIC | Age: 72
End: 2022-04-22

## 2022-04-22 ENCOUNTER — APPOINTMENT (EMERGENCY)
Dept: RADIOLOGY | Facility: HOSPITAL | Age: 72
DRG: 698 | End: 2022-04-22
Payer: COMMERCIAL

## 2022-04-22 DIAGNOSIS — R35.0 URINARY FREQUENCY: ICD-10-CM

## 2022-04-22 DIAGNOSIS — R79.89 ELEVATED SERUM CREATININE: Primary | ICD-10-CM

## 2022-04-22 DIAGNOSIS — D64.9 ANEMIA: ICD-10-CM

## 2022-04-22 DIAGNOSIS — N05.9 GLOMERULONEPHRITIS: ICD-10-CM

## 2022-04-22 DIAGNOSIS — R00.1 SINUS BRADYCARDIA: ICD-10-CM

## 2022-04-22 DIAGNOSIS — N17.9 AKI (ACUTE KIDNEY INJURY) (HCC): ICD-10-CM

## 2022-04-22 DIAGNOSIS — N28.9 ACUTE RENAL INSUFFICIENCY: ICD-10-CM

## 2022-04-22 DIAGNOSIS — R26.2 AMBULATORY DYSFUNCTION: ICD-10-CM

## 2022-04-22 DIAGNOSIS — T81.49XA SURGICAL SITE INFECTION: ICD-10-CM

## 2022-04-22 DIAGNOSIS — R60.0 PEDAL EDEMA: ICD-10-CM

## 2022-04-22 DIAGNOSIS — Z98.1 STATUS POST CERVICAL SPINAL FUSION: ICD-10-CM

## 2022-04-22 DIAGNOSIS — Z98.890 STATUS POST CATHETER ABLATION OF ATRIAL FLUTTER: ICD-10-CM

## 2022-04-22 DIAGNOSIS — M79.674 GREAT TOE PAIN, RIGHT: ICD-10-CM

## 2022-04-22 DIAGNOSIS — Z71.89 GOALS OF CARE, COUNSELING/DISCUSSION: ICD-10-CM

## 2022-04-22 DIAGNOSIS — R53.1 WEAKNESS: Primary | ICD-10-CM

## 2022-04-22 PROBLEM — I50.9 ACUTE CHF (CONGESTIVE HEART FAILURE) (HCC): Status: ACTIVE | Noted: 2022-04-22

## 2022-04-22 LAB
4HR DELTA HS TROPONIN: 0 NG/L
ABO GROUP BLD: NORMAL
ALBUMIN SERPL BCP-MCNC: 1.9 G/DL (ref 3.5–5)
ANION GAP SERPL CALCULATED.3IONS-SCNC: 8 MMOL/L (ref 4–13)
ATRIAL RATE: 57 BPM
BACTERIA UR QL AUTO: ABNORMAL /HPF
BASOPHILS # BLD AUTO: 0.12 THOUSANDS/ÂΜL (ref 0–0.1)
BASOPHILS NFR BLD AUTO: 2 % (ref 0–1)
BILIRUB UR QL STRIP: NEGATIVE
BLD GP AB SCN SERPL QL: NEGATIVE
BUN SERPL-MCNC: 30 MG/DL (ref 5–25)
CALCIUM SERPL-MCNC: 8.2 MG/DL (ref 8.3–10.1)
CARDIAC TROPONIN I PNL SERPL HS: 7 NG/L
CARDIAC TROPONIN I PNL SERPL HS: 7 NG/L
CHLORIDE SERPL-SCNC: 105 MMOL/L (ref 100–108)
CLARITY UR: ABNORMAL
CO2 SERPL-SCNC: 27 MMOL/L (ref 21–32)
COLOR UR: ABNORMAL
CREAT SERPL-MCNC: 2.65 MG/DL (ref 0.6–1.3)
EOSINOPHIL # BLD AUTO: 0.26 THOUSAND/ÂΜL (ref 0–0.61)
EOSINOPHIL NFR BLD AUTO: 4 % (ref 0–6)
ERYTHROCYTE [DISTWIDTH] IN BLOOD BY AUTOMATED COUNT: 14 % (ref 11.6–15.1)
GFR SERPL CREATININE-BSD FRML MDRD: 23 ML/MIN/1.73SQ M
GLUCOSE SERPL-MCNC: 107 MG/DL (ref 65–140)
GLUCOSE UR STRIP-MCNC: NEGATIVE MG/DL
HCT VFR BLD AUTO: 20.9 % (ref 36.5–49.3)
HGB BLD-MCNC: 6.6 G/DL (ref 12–17)
HGB UR QL STRIP.AUTO: ABNORMAL
HYALINE CASTS #/AREA URNS LPF: ABNORMAL /LPF
IMM GRANULOCYTES # BLD AUTO: 0.03 THOUSAND/UL (ref 0–0.2)
IMM GRANULOCYTES NFR BLD AUTO: 1 % (ref 0–2)
INR PPP: 1.95 (ref 0.84–1.19)
KETONES UR STRIP-MCNC: NEGATIVE MG/DL
LEUKOCYTE ESTERASE UR QL STRIP: NEGATIVE
LYMPHOCYTES # BLD AUTO: 1.38 THOUSANDS/ÂΜL (ref 0.6–4.47)
LYMPHOCYTES NFR BLD AUTO: 22 % (ref 14–44)
MCH RBC QN AUTO: 30.1 PG (ref 26.8–34.3)
MCHC RBC AUTO-ENTMCNC: 31.6 G/DL (ref 31.4–37.4)
MCV RBC AUTO: 95 FL (ref 82–98)
MONOCYTES # BLD AUTO: 0.79 THOUSAND/ÂΜL (ref 0.17–1.22)
MONOCYTES NFR BLD AUTO: 13 % (ref 4–12)
NEUTROPHILS # BLD AUTO: 3.76 THOUSANDS/ÂΜL (ref 1.85–7.62)
NEUTS SEG NFR BLD AUTO: 58 % (ref 43–75)
NITRITE UR QL STRIP: NEGATIVE
NON-SQ EPI CELLS URNS QL MICRO: ABNORMAL /HPF
NRBC BLD AUTO-RTO: 0 /100 WBCS
NT-PROBNP SERPL-MCNC: 3948 PG/ML
P AXIS: -7 DEGREES
PH UR STRIP.AUTO: 7 [PH]
PLATELET # BLD AUTO: 241 THOUSANDS/UL (ref 149–390)
PMV BLD AUTO: 9.9 FL (ref 8.9–12.7)
POTASSIUM SERPL-SCNC: 4.2 MMOL/L (ref 3.5–5.3)
PR INTERVAL: 218 MS
PROT UR STRIP-MCNC: >=300 MG/DL
PROTHROMBIN TIME: 21.8 SECONDS (ref 11.6–14.5)
QRS AXIS: 121 DEGREES
QRSD INTERVAL: 180 MS
QT INTERVAL: 486 MS
QTC INTERVAL: 473 MS
RBC # BLD AUTO: 2.19 MILLION/UL (ref 3.88–5.62)
RBC #/AREA URNS AUTO: ABNORMAL /HPF
RH BLD: POSITIVE
SODIUM SERPL-SCNC: 140 MMOL/L (ref 136–145)
SP GR UR STRIP.AUTO: 1.02 (ref 1–1.03)
SPECIMEN EXPIRATION DATE: NORMAL
T WAVE AXIS: -2 DEGREES
TSH SERPL DL<=0.05 MIU/L-ACNC: 2.96 UIU/ML (ref 0.45–4.5)
UROBILINOGEN UR QL STRIP.AUTO: 0.2 E.U./DL
VANCOMYCIN TROUGH SERPL-MCNC: 27.9 UG/ML (ref 10–20)
VENTRICULAR RATE: 57 BPM
WBC # BLD AUTO: 6.34 THOUSAND/UL (ref 4.31–10.16)
WBC #/AREA URNS AUTO: ABNORMAL /HPF

## 2022-04-22 PROCEDURE — 87081 CULTURE SCREEN ONLY: CPT | Performed by: STUDENT IN AN ORGANIZED HEALTH CARE EDUCATION/TRAINING PROGRAM

## 2022-04-22 PROCEDURE — 99285 EMERGENCY DEPT VISIT HI MDM: CPT | Performed by: EMERGENCY MEDICINE

## 2022-04-22 PROCEDURE — 83540 ASSAY OF IRON: CPT

## 2022-04-22 PROCEDURE — 80048 BASIC METABOLIC PNL TOTAL CA: CPT | Performed by: EMERGENCY MEDICINE

## 2022-04-22 PROCEDURE — 82272 OCCULT BLD FECES 1-3 TESTS: CPT

## 2022-04-22 PROCEDURE — 82607 VITAMIN B-12: CPT | Performed by: STUDENT IN AN ORGANIZED HEALTH CARE EDUCATION/TRAINING PROGRAM

## 2022-04-22 PROCEDURE — 82728 ASSAY OF FERRITIN: CPT

## 2022-04-22 PROCEDURE — 99285 EMERGENCY DEPT VISIT HI MDM: CPT

## 2022-04-22 PROCEDURE — 30233N1 TRANSFUSION OF NONAUTOLOGOUS RED BLOOD CELLS INTO PERIPHERAL VEIN, PERCUTANEOUS APPROACH: ICD-10-PCS | Performed by: STUDENT IN AN ORGANIZED HEALTH CARE EDUCATION/TRAINING PROGRAM

## 2022-04-22 PROCEDURE — 85025 COMPLETE CBC W/AUTO DIFF WBC: CPT | Performed by: EMERGENCY MEDICINE

## 2022-04-22 PROCEDURE — 71045 X-RAY EXAM CHEST 1 VIEW: CPT

## 2022-04-22 PROCEDURE — P9016 RBC LEUKOCYTES REDUCED: HCPCS

## 2022-04-22 PROCEDURE — 83550 IRON BINDING TEST: CPT

## 2022-04-22 PROCEDURE — 93010 ELECTROCARDIOGRAM REPORT: CPT | Performed by: INTERNAL MEDICINE

## 2022-04-22 PROCEDURE — 86901 BLOOD TYPING SEROLOGIC RH(D): CPT

## 2022-04-22 PROCEDURE — 84484 ASSAY OF TROPONIN QUANT: CPT

## 2022-04-22 PROCEDURE — 83880 ASSAY OF NATRIURETIC PEPTIDE: CPT | Performed by: EMERGENCY MEDICINE

## 2022-04-22 PROCEDURE — 84484 ASSAY OF TROPONIN QUANT: CPT | Performed by: EMERGENCY MEDICINE

## 2022-04-22 PROCEDURE — 80202 ASSAY OF VANCOMYCIN: CPT | Performed by: EMERGENCY MEDICINE

## 2022-04-22 PROCEDURE — 81001 URINALYSIS AUTO W/SCOPE: CPT

## 2022-04-22 PROCEDURE — 86900 BLOOD TYPING SEROLOGIC ABO: CPT

## 2022-04-22 PROCEDURE — 86850 RBC ANTIBODY SCREEN: CPT

## 2022-04-22 PROCEDURE — 84443 ASSAY THYROID STIM HORMONE: CPT

## 2022-04-22 PROCEDURE — 86920 COMPATIBILITY TEST SPIN: CPT

## 2022-04-22 PROCEDURE — 85610 PROTHROMBIN TIME: CPT

## 2022-04-22 PROCEDURE — 82746 ASSAY OF FOLIC ACID SERUM: CPT | Performed by: STUDENT IN AN ORGANIZED HEALTH CARE EDUCATION/TRAINING PROGRAM

## 2022-04-22 PROCEDURE — 82040 ASSAY OF SERUM ALBUMIN: CPT

## 2022-04-22 PROCEDURE — 99223 1ST HOSP IP/OBS HIGH 75: CPT | Performed by: STUDENT IN AN ORGANIZED HEALTH CARE EDUCATION/TRAINING PROGRAM

## 2022-04-22 PROCEDURE — 93005 ELECTROCARDIOGRAM TRACING: CPT

## 2022-04-22 PROCEDURE — 36415 COLL VENOUS BLD VENIPUNCTURE: CPT | Performed by: EMERGENCY MEDICINE

## 2022-04-22 PROCEDURE — C9113 INJ PANTOPRAZOLE SODIUM, VIA: HCPCS | Performed by: STUDENT IN AN ORGANIZED HEALTH CARE EDUCATION/TRAINING PROGRAM

## 2022-04-22 RX ORDER — GABAPENTIN 300 MG/1
300 CAPSULE ORAL
Status: DISCONTINUED | OUTPATIENT
Start: 2022-04-22 | End: 2022-04-29 | Stop reason: HOSPADM

## 2022-04-22 RX ORDER — METHOCARBAMOL 500 MG/1
750 TABLET, FILM COATED ORAL EVERY 6 HOURS PRN
Status: DISCONTINUED | OUTPATIENT
Start: 2022-04-22 | End: 2022-04-29 | Stop reason: HOSPADM

## 2022-04-22 RX ORDER — POTASSIUM CHLORIDE 20 MEQ/1
40 TABLET, EXTENDED RELEASE ORAL DAILY
Status: DISCONTINUED | OUTPATIENT
Start: 2022-04-23 | End: 2022-04-26

## 2022-04-22 RX ORDER — DULOXETIN HYDROCHLORIDE 30 MG/1
60 CAPSULE, DELAYED RELEASE ORAL DAILY
Status: DISCONTINUED | OUTPATIENT
Start: 2022-04-23 | End: 2022-04-29 | Stop reason: HOSPADM

## 2022-04-22 RX ORDER — POLYETHYLENE GLYCOL 3350 17 G/17G
17 POWDER, FOR SOLUTION ORAL DAILY PRN
Status: DISCONTINUED | OUTPATIENT
Start: 2022-04-22 | End: 2022-04-29 | Stop reason: HOSPADM

## 2022-04-22 RX ORDER — ONDANSETRON 2 MG/ML
4 INJECTION INTRAMUSCULAR; INTRAVENOUS EVERY 6 HOURS PRN
Status: DISCONTINUED | OUTPATIENT
Start: 2022-04-22 | End: 2022-04-29 | Stop reason: HOSPADM

## 2022-04-22 RX ORDER — FUROSEMIDE 10 MG/ML
40 INJECTION INTRAMUSCULAR; INTRAVENOUS 2 TIMES DAILY
Status: DISCONTINUED | OUTPATIENT
Start: 2022-04-22 | End: 2022-04-23

## 2022-04-22 RX ORDER — ASCORBIC ACID 500 MG
500 TABLET ORAL 2 TIMES DAILY
Status: DISCONTINUED | OUTPATIENT
Start: 2022-04-22 | End: 2022-04-23

## 2022-04-22 RX ORDER — MAGNESIUM HYDROXIDE/ALUMINUM HYDROXICE/SIMETHICONE 120; 1200; 1200 MG/30ML; MG/30ML; MG/30ML
30 SUSPENSION ORAL EVERY 6 HOURS PRN
Status: DISCONTINUED | OUTPATIENT
Start: 2022-04-22 | End: 2022-04-25

## 2022-04-22 RX ORDER — FLECAINIDE ACETATE 50 MG/1
100 TABLET ORAL 2 TIMES DAILY
Status: DISCONTINUED | OUTPATIENT
Start: 2022-04-22 | End: 2022-04-29 | Stop reason: HOSPADM

## 2022-04-22 RX ORDER — WARFARIN SODIUM 5 MG/1
2.5 TABLET ORAL
Status: DISCONTINUED | OUTPATIENT
Start: 2022-04-24 | End: 2022-04-22

## 2022-04-22 RX ORDER — PRAVASTATIN SODIUM 40 MG
40 TABLET ORAL
Status: DISCONTINUED | OUTPATIENT
Start: 2022-04-22 | End: 2022-04-29 | Stop reason: HOSPADM

## 2022-04-22 RX ORDER — WARFARIN SODIUM 5 MG/1
5 TABLET ORAL
Status: DISCONTINUED | OUTPATIENT
Start: 2022-04-23 | End: 2022-04-22

## 2022-04-22 RX ORDER — OXYCODONE HYDROCHLORIDE 5 MG/1
5 TABLET ORAL EVERY 4 HOURS PRN
Status: ACTIVE | OUTPATIENT
Start: 2022-04-22 | End: 2022-04-25

## 2022-04-22 RX ORDER — AMLODIPINE BESYLATE 5 MG/1
5 TABLET ORAL DAILY
Status: DISCONTINUED | OUTPATIENT
Start: 2022-04-23 | End: 2022-04-23

## 2022-04-22 RX ORDER — DOCUSATE SODIUM 100 MG/1
100 CAPSULE, LIQUID FILLED ORAL 2 TIMES DAILY
Status: DISCONTINUED | OUTPATIENT
Start: 2022-04-22 | End: 2022-04-29 | Stop reason: HOSPADM

## 2022-04-22 RX ORDER — PANTOPRAZOLE SODIUM 40 MG/10ML
40 INJECTION, POWDER, LYOPHILIZED, FOR SOLUTION INTRAVENOUS EVERY 12 HOURS SCHEDULED
Status: DISCONTINUED | OUTPATIENT
Start: 2022-04-22 | End: 2022-04-26

## 2022-04-22 RX ORDER — GABAPENTIN 100 MG/1
100 CAPSULE ORAL EVERY MORNING
Status: DISCONTINUED | OUTPATIENT
Start: 2022-04-23 | End: 2022-04-29 | Stop reason: HOSPADM

## 2022-04-22 RX ORDER — METOPROLOL SUCCINATE 50 MG/1
100 TABLET, EXTENDED RELEASE ORAL DAILY
Status: DISCONTINUED | OUTPATIENT
Start: 2022-04-23 | End: 2022-04-23

## 2022-04-22 RX ORDER — ALBUMIN (HUMAN) 12.5 G/50ML
25 SOLUTION INTRAVENOUS DAILY
Status: COMPLETED | OUTPATIENT
Start: 2022-04-23 | End: 2022-04-25

## 2022-04-22 RX ORDER — TAMSULOSIN HYDROCHLORIDE 0.4 MG/1
0.4 CAPSULE ORAL
Status: DISCONTINUED | OUTPATIENT
Start: 2022-04-22 | End: 2022-04-29 | Stop reason: HOSPADM

## 2022-04-22 RX ADMIN — PANTOPRAZOLE SODIUM 40 MG: 40 INJECTION, POWDER, FOR SOLUTION INTRAVENOUS at 21:40

## 2022-04-22 RX ADMIN — TAMSULOSIN HYDROCHLORIDE 0.4 MG: 0.4 CAPSULE ORAL at 18:15

## 2022-04-22 RX ADMIN — FUROSEMIDE 40 MG: 10 INJECTION, SOLUTION INTRAMUSCULAR; INTRAVENOUS at 18:14

## 2022-04-22 RX ADMIN — GABAPENTIN 300 MG: 300 CAPSULE ORAL at 21:40

## 2022-04-22 RX ADMIN — FLECAINIDE ACETATE 100 MG: 50 TABLET ORAL at 18:49

## 2022-04-22 RX ADMIN — DOCUSATE SODIUM 100 MG: 100 CAPSULE, LIQUID FILLED ORAL at 18:15

## 2022-04-22 RX ADMIN — ONDANSETRON 4 MG: 2 INJECTION INTRAMUSCULAR; INTRAVENOUS at 21:39

## 2022-04-22 NOTE — ASSESSMENT & PLAN NOTE
Continue warfarin   5 mg on Wednesday and Saturday, 2 5 mg all other days  Monitor INR  Goal between 2-3

## 2022-04-22 NOTE — ASSESSMENT & PLAN NOTE
Blood pressure elevated initially on admission    Currently on amlodipine 5 mg and metoprolol 100 mg daily - continue both  Adding Lasix 40 mg b i d  IV due to concern for CHF

## 2022-04-22 NOTE — ASSESSMENT & PLAN NOTE
Pt is 6 weeks status post cervical fusion complicated by MRSA infection   He is currently receiving home IV vancomycin Q 24 hours, with plan to continue for total of 28 days followed by another for 28 days of p o  Today is day #23/28 of IV vancomycin    Continue while inpatient  Pharmacy consult for management  Suspicion for vanco contributing to renal failure, will consult Infectious Disease as patient likely needs change in antibiotic

## 2022-04-22 NOTE — ASSESSMENT & PLAN NOTE
Patient presents with generalized weakness, difficulty with ADLs  Suspect secondary to volume overload/anasarca, deconditioning from recent hospitalization and anemia  Improved slightly with transfusion  PT/OT consult  Case management consult for likely placement assistance

## 2022-04-22 NOTE — ASSESSMENT & PLAN NOTE
Holding home warfarin in setting of anemia requiring trasnfusion  At home on coumadin 5 mg on Wednesday and Saturday, 2 5 mg all other days  Continue mechanical VTE prophylaxis  Consider resuming Coumadin in the next 24 hours once GI bleeding ruled out

## 2022-04-22 NOTE — ASSESSMENT & PLAN NOTE
Continue metoprolol 100 mg and flecainide  Holding warfarin due to anemia requiring transfusion  If hemoglobin remains stable and no sign of GI bleeding will resume Coumadin on Sunday

## 2022-04-22 NOTE — H&P
[place]  H&P- Luiz Oronael 1950, 71 y o  male MRN: [id number]  Unit/Bed#: -01 Encounter: [id number]  Primary Care Provider: Eunice Dewey MD   Date and time admitted to hospital: 4/22/2022  2:41 PM    * Ambulatory dysfunction  Assessment & Plan  Patient presents with generalized weakness, difficulty with ADLs  Potentially secondary to undiagnosed acute heart failure, anemia  · Has been feeling fatigued, lower extremity and upper extremity swelling, lack of appetite  · Will get echo, check albumin, transfuse  · See specific plans  PT/OT consult  Case management consult for likely placement assistance     Acute CHF (congestive heart failure) ([facility] )  Assessment & Plan  Wt Readings from Last 3 Encounters:   04/22/22 113 kg (249 lb 1 9 oz)   04/14/22 113 kg (249 lb 5 4 oz)   03/31/22 107 kg (235 lb)     Presents to ED with generalized weakness and leg swelling   No history of heart failure     CXR pending official read   BNP elevated to 3948   EKG sinus bradycardia - rate 57    ECHO 11/22/2021 revealed:  EF 47%, normal systolic function, normal wall motion, grade 2 diastolic relaxation, , mild wall thickness increase, mild right and left atrium, mild MR, mild TR, mild aortic stenosis, mild pulmonary artery systolic pressure increase  o Repeat ECHO ordered     Diuresis with 40 mg IV Lasix b i d     Telemetry   Continue to monitor electrolytes and replete as needed   Daily weights   Strict I & Os   Cardiac diet, low sodium <2g, fluid restriction <1500   Consider cardiology consult post echo if changes    Surgical site infection  Assessment & Plan  The patient is 6 weeks status post cervical fusion complicated by MRSA infection   He is currently receiving home IV vancomycin Q 24 hours  Continue while inpatient  Pharmacy consult for management    Currently no elevation in WBC and denies infectious symptoms  Continue to monitor    JANEL (acute kidney injury) [facility])  Assessment & Plan  Patient presents with JANEL with creatinine 2 65  Patient has been receiving vancomycin IV as outpatient - possible contributor  Also has been experiencing lower and upper extremity edema  Also with chronic anemia, low hemoglobin today 6 6    All other contributing factors to potential JANEL  Will check UA  Urinary retention protocol  Checking albumin  Will diurese with hopeful improvement  Avoid NSAIDs, hypotension, nephrotoxic agents      Anemia  Assessment & Plan  Presents to ED with weakness   CBC showing: Hgb 6 6/Hct 20 9   o MCV 95-normal   Iron panel ordered   o Based on iron panel - consider IV venofer   TSH ordered      Blood consent - obtained and chart  o Will initiate blood transfusion   Transfuse if Hgb <7  o No history of transfusions    Trend CBC q 8 hrs    Lab Results   Component Value Date    HGB 6 6 (LL) 04/22/2022    HGB 7 9 (L) 04/18/2022         Paroxysmal A-fib (HCC)  Assessment & Plan  EKG showing:   Continue metoprolol 100 mg and flecainide  Holding warfarin due to anemia requiring transfusion    Factor V Leiden mutation [facility])  Assessment & Plan  Holding home warfarin in setting of anemia requiring trasnfusion  · At home on coumadin 5 mg on Wednesday and Saturday, 2 5 mg all other days    Continue mechanical VTE prophylaxis    Essential hypertension  Assessment & Plan  Blood pressure elevated initially on admission    Currently on amlodipine 5 mg and metoprolol 100 mg daily - continue both  Adding Lasix 40 mg b i d  IV due to concern for CHF    VTE Prophylaxis: Pharmacologic VTE Prophylaxis contraindicated due to Drop in hemoglobin, transfusion needed  / sequential compression device and foot pump applied   Code Status: Level 3  POLST: POLST form is on file already (pre-hospital)  Discussion with family:  Discussed with wife at bedside, she is requesting that she is present for all decisions, patient does have full capacity and is oriented x3    Anticipated Length of Stay:  Patient will be admitted on an Inpatient basis with an anticipated length of stay of  > 2 midnights  Justification for Hospital Stay:  Evaluation by PT/OT, blood transfusion, diuresis    Total Time for Visit, including Counseling / Coordination of Care: 60 minutes  Greater than 50% of this total time spent on direct patient counseling and coordination of care  Chief Complaint:   weakness    History of Present Illness:    Miryam Vazquez is a 71 y o  male history of hypertension, chronic pain, atrial fibrillation, factor 5 Leiden, hyperlipidemia, anemia who presents with generalized weakness  He is 6 weeks status post cervical fusion complicated by MRSA infection and getting IV vancomycin at home  He had recent admission from 03/31 to 4/15 at [facility] due to surgical site infection  Original surgery on 04/01  Patient discharge 04/15 for 4 weeks of IV vancomycin via PICC followed by 4 weeks of p o  Antibiotics  He presents with fatigue but denies any fever/chills, shortness of breath, chest pain vomiting or diarrhea  Only other complaint is decreased appetite and associated nausea  In ED was evaluated and found to have bilateral lower and upper extremity edema, JANEL, hemoglobin 6 7 (baseline around 7)  On my exam patient is mostly concerned about fatigue, and low upper and lower extremity swelling  The swelling started yesterday and worse today  No prior diagnosis of heart failure  Review of Systems:    Review of Systems   Constitutional: Positive for appetite change  Negative for activity change, chills and fever  HENT: Negative for congestion, rhinorrhea and sore throat  Eyes: Negative for visual disturbance  Respiratory: Negative for cough, chest tightness and shortness of breath  Cardiovascular: Positive for leg swelling  Negative for chest pain and palpitations  Gastrointestinal: Positive for nausea   Negative for abdominal pain, constipation, diarrhea and vomiting  Genitourinary: Negative for difficulty urinating, dysuria, frequency and urgency  Musculoskeletal: Negative for arthralgias and myalgias  Skin: Negative for rash  Neurological: Positive for weakness and headaches  Negative for seizures and syncope  All other systems reviewed and are negative  Past Medical and Surgical History:     Past Medical History:   Diagnosis Date    Acute venous embolism and thrombosis of deep vessels of proximal lower extremity (HCC)     Last assessed: 5/18/15    Arthritis     Cellulitis     LE    CPAP (continuous positive airway pressure) dependence     Factor V Leiden ([facility] )     Forgetfulness     [place] (hard of hearing)     Paroxysmal atrial fibrillation ([facility] )     Last assessed: 11/2/15    Sleep apnea        Past Surgical History:   Procedure Laterality Date    BACK SURGERY      Lower    COLON SURGERY      COLONOSCOPY      INCISION AND DRAINAGE POSTERIOR SPINE N/A 4/1/2022    Procedure: Posterior cervical evacuation of postoperative collection and debridement with placement of drains C3-T1; Surgeon: Ruben Ross MD;  [facility];  Service: Neurosurgery    IA ARTHRODESIS ANT INTERBODY MIN DISCECTOMY, CERVICAL BELOW C2 N/A 3/11/2022    Procedure: Anterior cervical discectomy and fixation fusion C5/6 and C6/7; Posterior cervical decompression and instrumented fusion C3-T1; Surgeon: Ruben Ross MD;  [facility]: [facility];  Service: Neurosurgery       Meds/Allergies:    Prior to Admission medications    Medication Sig Start Date End Date Taking?  Authorizing Provider   acetaminophen (TYLENOL) 325 mg tablet Take 3 tablets (975 mg total) by mouth 3 (three) times a day as needed for mild pain 4/15/22   Praveen Borja PA-C   amLODIPine (NORVASC) 5 mg tablet Take 1 tablet (5 mg total) by mouth daily 3/29/22   Chalino Brown MD   [facility] Vitamin C 500 MG tablet TAKE 1 TABLET BY MOUTH 2 TIMES A DAY 1/15/21   Major Diamond MD docusate sodium (COLACE) 100 mg capsule Take 1 capsule (100 mg total) by mouth 2 (two) times a day 3/29/22   Reyes Abrams MD   DULoxetine (CYMBALTA) 60 mg delayed release capsule TAKE 1 CAPSULE BY MOUTH  DAILY 10/5/21   Eunice Dewey MD   flecainide (TAMBOCOR) 100 mg tablet TAKE 1 TABLET BY MOUTH  TWICE DAILY 10/5/21   Eunice Dewey MD   gabapentin (NEURONTIN) 100 mg capsule Take 1 capsule (100 mg total) by mouth in the morning 3/29/22   Reyes Abrams MD   gabapentin (NEURONTIN) 300 mg capsule Take 1 capsule (300 mg total) by mouth daily at bedtime 3/28/22   Reyes Abrams MD   methocarbamol (ROBAXIN) 750 mg tablet Take 1 tablet (750 mg total) by mouth every 6 (six) hours as needed for muscle spasms 4/15/22   Oswaldo Borja PA-C   metoprolol succinate (TOPROL-XL) 100 mg 24 hr tablet TAKE 1 TABLET BY MOUTH  DAILY 3/23/22   Eunice Dewey MD   oxyCODONE (ROXICODONE) 5 immediate release tablet Take 0 5-1 tablets (2 5-5 mg total) by mouth every 4 (four) hours as needed for moderate pain or severe pain for up to 10 days Max Daily Amount: 30 mg 4/15/22 4/25/22  Leticia Borja PA-C   potassium chloride (K-DUR,KLOR-CON) 20 mEq tablet Take 2 tablets (40 mEq total) by mouth daily 3/30/22   Reyes Abrams MD   pravastatin (PRAVACHOL) 40 mg tablet TAKE 1 TABLET BY MOUTH  DAILY 3/23/22   Eunice Dewey MD   tamsulosin (FLOMAX) 0 4 mg TAKE 1 CAPSULE BY MOUTH  DAILY WITH DINNER 10/5/21   Eunice Dewey MD   warfarin (COUMADIN) 5 mg tablet Take by mouth daily Take 2 5 mg (1/2 tablets) daily except Wednesday and Saturday take 5 mg daily  Historical Provider, MD     I have reviewed home medications with patient personally  Allergies:    Allergies   Allergen Reactions    Morphine GI Intolerance    Vitamin K Other (See Comments)     On coumadin-patient sensitive to vitamin K amounts in meds etc        Social History:     Marital Status: /Civil Union     Patient Pre-hospital Living Situation:  Home with wife  Patient Pre-hospital Level of Mobility:  Uses walker  Patient Pre-hospital Diet Restrictions:  No restriction  Substance Use History:   Social History     Substance and Sexual Activity   Alcohol Use Not Currently     Social History     Tobacco Use   Smoking Status Never Smoker   Smokeless Tobacco Never Used     Social History     Substance and Sexual Activity   Drug Use No       Family History:    Family History   Problem Relation Age of Onset    Lung cancer Mother     No Known Problems Father     Heart attack Brother     Atrial fibrillation Brother     Emphysema Sister        Physical Exam:     Vitals:   Blood Pressure: (!) 217/104 (04/22/22 1701)  Pulse: 58 (04/22/22 1701)  Temperature: 98 3 °F (36 8 °C) (04/22/22 1701)  Temp Source: Temporal (04/22/22 1427)  Respirations: 18 (04/22/22 1701)  Weight - Scale: 113 kg (249 lb 1 9 oz) (04/22/22 1427)  SpO2: 97 % (04/22/22 1701)    Physical Exam  Vitals and nursing note reviewed  Constitutional:       General: He is not in acute distress  Appearance: Normal appearance  HENT:      Head: Normocephalic and atraumatic  Nose: No congestion  Mouth/Throat:      Mouth: Mucous membranes are moist    Eyes:      Conjunctiva/sclera: Conjunctivae normal    Neck:      Comments: Range of motion limited by cervical collar, patient has healing incision to posterior neck  Cardiovascular:      Rate and Rhythm: Normal rate and regular rhythm  Pulses: Normal pulses  Heart sounds: Murmur heard  Pulmonary:      Effort: Pulmonary effort is normal  No respiratory distress  Breath sounds: Normal breath sounds  Abdominal:      General: Bowel sounds are normal       Palpations: Abdomen is soft  Tenderness: There is no abdominal tenderness  Musculoskeletal:         General: Normal range of motion  Arms:       Right lower leg: Edema present  Left lower leg: Edema present  Skin:     General: Skin is warm and dry     Neurological:      Mental Status: He is alert and oriented to person, place, and time  Additional Data:     Lab Results: I have personally reviewed pertinent reports  Results from last 7 days   Lab Units 04/22/22  1522   WBC Thousand/uL 6 34   HEMOGLOBIN g/dL 6 6*   HEMATOCRIT % 20 9*   PLATELETS Thousands/uL 241   NEUTROS PCT % 58   LYMPHS PCT % 22   MONOS PCT % 13*   EOS PCT % 4     Results from last 7 days   Lab Units 04/22/22  1522   SODIUM mmol/L 140   POTASSIUM mmol/L 4 2   CHLORIDE mmol/L 105   CO2 mmol/L 27   BUN mg/dL 30*   CREATININE mg/dL 2 65*   ANION GAP mmol/L 8   CALCIUM mg/dL 8 2*   GLUCOSE RANDOM mg/dL 107     Results from last 7 days   Lab Units 04/18/22  1500   INR  3 22*                   Imaging: I have personally reviewed pertinent reports  XR chest 1 view portable   ED Interpretation by Petr Anderson DO (04/22 1537)   ED Provider X-ray Interpretation    My X-ray interpretation of the Chest: was negative for infiltrate, effusion, pneumothorax, or wide mediastinum    Petr Anderson DO      Final Result by Chandni Beatty MD (04/22 1730)      No acute cardiopulmonary disease  Workstation performed: [id number]             EKG, Pathology, and Other Studies Reviewed on Admission:   · EKG:  Sinus bradycardia, rate 57, RBBB     Allscripts / Epic Records Reviewed: Yes     ** Please Note: This note has been constructed using a voice recognition system   **

## 2022-04-22 NOTE — ASSESSMENT & PLAN NOTE
Patient presents with JANEL with creatinine 2 65  Patient has been receiving vancomycin IV as outpatient - possible contributor  Also has been experiencing lower and upper extremity edema  Also with chronic anemia, low hemoglobin today 6 6    All other contributing factors to potential JANEL  Will check UA  Urinary retention protocol  Checking albumin  Will diurese with hopeful improvement  Avoid NSAIDs, hypotension, nephrotoxic agents

## 2022-04-22 NOTE — ASSESSMENT & PLAN NOTE
Presents to ED with weakness   CBC showing: Hgb 6 6/Hct 20 9   o MCV 95-normal   Iron panel ordered   o Based on iron panel - consider IV venofer   TSH ordered      Blood consent - obtained and chart  o Will initiate blood transfusion   Transfuse if Hgb <7  o No history of transfusions    Trend CBC q 8 hrs    Lab Results   Component Value Date    HGB 6 6 (LL) 04/22/2022    HGB 7 9 (L) 04/18/2022

## 2022-04-22 NOTE — ASSESSMENT & PLAN NOTE
Patient presents with generalized weakness, difficulty with ADLs  Potentially secondary to undiagnosed acute heart failure, anemia  · Has been feeling fatigued, lower extremity and upper extremity swelling, lack of appetite  · Will get echo, check albumin, transfuse  · See specific plans  PT/OT consult  Case management consult for likely placement assistance

## 2022-04-22 NOTE — ASSESSMENT & PLAN NOTE
Wt Readings from Last 3 Encounters:   04/22/22 113 kg (249 lb 1 9 oz)   04/14/22 113 kg (249 lb 5 4 oz)   03/31/22 107 kg (235 lb)     Presents to ED with generalized weakness and leg swelling   No history of heart failure     CXR pending official read   BNP elevated to 3948   EKG sinus bradycardia - rate 57    ECHO 11/22/2021 revealed:  EF 03%, normal systolic function, normal wall motion, grade 2 diastolic relaxation, , mild wall thickness increase, mild right and left atrium, mild MR, mild TR, mild aortic stenosis, mild pulmonary artery systolic pressure increase  o Repeat ECHO ordered     Diuresis with 40 mg IV Lasix b i d     Telemetry   Continue to monitor electrolytes and replete as needed   Daily weights   Strict I & Os   Cardiac diet, low sodium <2g, fluid restriction <1500   Consider cardiology consult post echo if changes

## 2022-04-22 NOTE — TELEPHONE ENCOUNTER
Received a call from [facility] regarding patient feeling weakness/fatigue  He states Dr Rah Urena would know about this as it happened in the hospital  Patient is not using NSAIDs  Patient is hydrating and not having issues urinating  It is yellow/gold not dark  CRE elevated  Per Dr Ashley Gustafson  Last dose yesterday  Repeat all labs Sunday  Verbalized w/Apryl and with patient's wife Adrien Mcarthur  Should any of these sx worsen or not improve after stopping medication, patient will seek immediate medical attention

## 2022-04-22 NOTE — PROGRESS NOTES
Vancomycin Assessment    Luiz Stiles is a 71 y o  male who is currently receiving vancomycin for MRSA  Relevant clinical data and objective history reviewed:  Creatinine   Date Value Ref Range Status   04/22/2022 2 65 (H) 0 60 - 1 30 mg/dL Final     Comment:     Standardized to IDMS reference method   04/18/2022 1 88 (H) 0 60 - 1 30 mg/dL Final     Comment:     Standardized to IDMS reference method   04/14/2022 1 10 0 60 - 1 30 mg/dL Final     Comment:     Standardized to IDMS reference method   10/25/2017 1 24 0 76 - 1 27 mg/dL Final   11/03/2016 1 11 0 76 - 1 27 mg/dL Final   01/23/2015 1 20 0 60 - 1 30 mg/dL Final     Comment:     Standardized to IDMS reference method     BP (!) 217/104   Pulse 58   Temp 98 3 °F (36 8 °C)   Resp 18   Wt 113 kg (249 lb 1 9 oz)   SpO2 97%   BMI 34 75 kg/m²   No intake/output data recorded  Lab Results   Component Value Date/Time    BUN 30 (H) 04/22/2022 03:22 PM    BUN 17 11/02/2021 01:02 PM    WBC 6 34 04/22/2022 03:22 PM    WBC 8 43 01/23/2015 04:40 AM    HGB 6 6 (LL) 04/22/2022 03:22 PM    HGB 12 9 01/23/2015 04:40 AM    HCT 20 9 (L) 04/22/2022 03:22 PM    HCT 39 5 01/23/2015 04:40 AM    MCV 95 04/22/2022 03:22 PM    MCV 93 01/23/2015 04:40 AM     04/22/2022 03:22 PM     01/23/2015 04:40 AM     Temp Readings from Last 3 Encounters:   04/22/22 98 3 °F (36 8 °C)   04/15/22 97 6 °F (36 4 °C)   03/31/22 (!) 96 4 °F (35 8 °C) (Tympanic)     Vancomycin Days of Therapy: 7? Assessment/Plan  The patient is currently on vancomycin utilizing scheduled dosing based on adjusted body weight (due to obesity)  Baseline risks associated with therapy include: pre-existing renal impairment and advanced age  The patient was receiving vancomycin outpatient and trough from today came back as 27 9 mcg/mL which is supra-therapeutic  Will hold scheduled dosing for now and obtain a random level with AM labs and pulse dose patient    Pharmacy will also follow closely for s/sx of nephrotoxicity, infusion reactions, and appropriateness of therapy  Due to infection severity, will target a trough of 15-20  Pharmacy will continue to follow the patients culture results and clinical progress daily      Bubba Brito Pharmacist

## 2022-04-22 NOTE — ASSESSMENT & PLAN NOTE
Patient presents volume overload, found to have acute anemia, hypoalbuminemia, proteinuria and JANEL with creatinine    Patient has been receiving vancomycin IV as outpatient for surgical site infection  Urinary retention protocol  Minimal improvement in anasarca with IV Lasix and albumin over the last 24 hours however creatinine worsening  Will discontinue IV Lasix and continue albumin  Consult Nephrology  Will also consult Infectious Disease for alternative antibiotic  Avoid NSAIDs, hypotension, nephrotoxic agents

## 2022-04-22 NOTE — ASSESSMENT & PLAN NOTE
Wt Readings from Last 3 Encounters:   04/22/22 113 kg (249 lb 1 9 oz)   04/14/22 113 kg (249 lb 5 4 oz)   03/31/22 107 kg (235 lb)     Presents to ED with generalized weakness and leg swelling   No history of heart failure     CXR pending official read   BNP elevated to 3948   EKG sinus bradycardia - rate 57    ECHO 11/22/2021 revealed:  EF 59%, normal systolic function, normal wall motion, grade 2 diastolic relaxation, , mild wall thickness increase, mild right and left atrium, mild MR, mild TR, mild aortic stenosis, mild pulmonary artery systolic pressure increase  o Repeat ECHO ordered     Diuresis with 40 mg IV Lasix b i d     Telemetry   Continue to monitor electrolytes and replete as needed   Daily weights   Strict I & Os   Cardiac diet, low sodium <2g, fluid restriction <1500   Consider cardiology consult post echo if changes

## 2022-04-22 NOTE — ASSESSMENT & PLAN NOTE
Currently on amlodipine 5 mg and metoprolol 100 mg daily - change hold parameters  Patient also on Albumin/IV Lasix 40 mg b i d

## 2022-04-22 NOTE — ASSESSMENT & PLAN NOTE
The patient is 6 weeks status post cervical fusion complicated by MRSA infection   He is currently receiving home IV vancomycin Q 24 hours  Continue while inpatient  Pharmacy consult for management    Currently no elevation in WBC and denies infectious symptoms  Continue to monitor

## 2022-04-22 NOTE — TELEPHONE ENCOUNTER
Discussed with Dr Theta Mcburney  Since patient was not feeling well, patient would go directly to ED  He will go to [facility] to be seen and evaluated      Dr Theta Mcburney can be contacted with any further information

## 2022-04-22 NOTE — ASSESSMENT & PLAN NOTE
New normocytic anemia  Previously around 13 prior to surgery in March  May be due to acute illness and renal failure    CBC showing: Hgb 6 6/Hct 20 9/MCV 95-normal  Received 1 unit PRBCs 4/22 for hemoglobin of 6 6  Transfuse if Hgb <7  B12 and folate low normal  Iron panel- iron saturation 20%, TIBC 204, iron 41

## 2022-04-22 NOTE — PLAN OF CARE
Problem: Potential for Falls  Goal: Patient will remain free of falls  Description: INTERVENTIONS:  - Educate patient/family on patient safety including physical limitations  - Instruct patient to call for assistance with activity   - Consult OT/PT to assist with strengthening/mobility   - Keep Call bell within reach  - Keep bed low and locked with side rails adjusted as appropriate  - Keep care items and personal belongings within reach  - Initiate and maintain comfort rounds  - Make Fall Risk Sign visible to staff  - Offer Toileting every  Hours, in advance of need  - Initiate/Maintain alarm  - Obtain necessary fall risk management equipment:   - Apply yellow socks and bracelet for high fall risk patients  - Consider moving patient to room near nurses station  Outcome: Progressing     Problem: METABOLIC, FLUID AND ELECTROLYTES - ADULT  Goal: Electrolytes maintained within normal limits  Description: INTERVENTIONS:  - Monitor labs and assess patient for signs and symptoms of electrolyte imbalances  - Administer electrolyte replacement as ordered  - Monitor response to electrolyte replacements, including repeat lab results as appropriate  - Instruct patient on fluid and nutrition as appropriate  Outcome: Progressing     Problem: SKIN/TISSUE INTEGRITY - ADULT  Goal: Incision(s), wounds(s) or drain site(s) healing without S/S of infection  Description: INTERVENTIONS  - Assess and document dressing, incision, wound bed, drain sites and surrounding tissue  - Provide patient and family education  - Perform skin care/dressing changes every  Outcome: Progressing     Problem: INFECTION - ADULT  Goal: Absence or prevention of progression during hospitalization  Description: INTERVENTIONS:  - Assess and monitor for signs and symptoms of infection  - Monitor lab/diagnostic results  - Monitor all insertion sites, i e  indwelling lines, tubes, and drains  - Monitor endotracheal if appropriate and nasal secretions for changes in amount and color  - [place] appropriate cooling/warming therapies per order  - Administer medications as ordered  - Instruct and encourage patient and family to use good hand hygiene technique  - Identify and instruct in appropriate isolation precautions for identified infection/condition  Outcome: Progressing  Goal: Absence of fever/infection during neutropenic period  Description: INTERVENTIONS:  - Monitor WBC    Outcome: Progressing

## 2022-04-23 LAB
ABO GROUP BLD BPU: NORMAL
ANION GAP SERPL CALCULATED.3IONS-SCNC: 7 MMOL/L (ref 4–13)
BASOPHILS # BLD AUTO: 0.13 THOUSANDS/ÂΜL (ref 0–0.1)
BASOPHILS NFR BLD AUTO: 3 % (ref 0–1)
BPU ID: NORMAL
BUN SERPL-MCNC: 30 MG/DL (ref 5–25)
CALCIUM SERPL-MCNC: 8.2 MG/DL (ref 8.3–10.1)
CHLORIDE SERPL-SCNC: 107 MMOL/L (ref 100–108)
CO2 SERPL-SCNC: 27 MMOL/L (ref 21–32)
CREAT SERPL-MCNC: 2.79 MG/DL (ref 0.6–1.3)
CREAT UR-MCNC: 45 MG/DL
CROSSMATCH: NORMAL
EOSINOPHIL # BLD AUTO: 0.27 THOUSAND/ÂΜL (ref 0–0.61)
EOSINOPHIL NFR BLD AUTO: 5 % (ref 0–6)
ERYTHROCYTE [DISTWIDTH] IN BLOOD BY AUTOMATED COUNT: 14.9 % (ref 11.6–15.1)
FERRITIN SERPL-MCNC: 521 NG/ML (ref 8–388)
FOLATE SERPL-MCNC: 10.4 NG/ML (ref 3.1–17.5)
GFR SERPL CREATININE-BSD FRML MDRD: 21 ML/MIN/1.73SQ M
GLUCOSE SERPL-MCNC: 93 MG/DL (ref 65–140)
HCT VFR BLD AUTO: 25.9 % (ref 36.5–49.3)
HCT VFR BLD AUTO: 25.9 % (ref 36.5–49.3)
HCT VFR BLD AUTO: 26.1 % (ref 36.5–49.3)
HGB BLD-MCNC: 8.1 G/DL (ref 12–17)
HGB BLD-MCNC: 8.2 G/DL (ref 12–17)
HGB BLD-MCNC: 8.2 G/DL (ref 12–17)
IMM GRANULOCYTES # BLD AUTO: 0.01 THOUSAND/UL (ref 0–0.2)
IMM GRANULOCYTES NFR BLD AUTO: 0 % (ref 0–2)
IRON SATN MFR SERPL: 20 % (ref 20–50)
IRON SERPL-MCNC: 41 UG/DL (ref 65–175)
LYMPHOCYTES # BLD AUTO: 1.34 THOUSANDS/ÂΜL (ref 0.6–4.47)
LYMPHOCYTES NFR BLD AUTO: 26 % (ref 14–44)
MAGNESIUM SERPL-MCNC: 1.8 MG/DL (ref 1.6–2.6)
MCH RBC QN AUTO: 29.2 PG (ref 26.8–34.3)
MCHC RBC AUTO-ENTMCNC: 31.4 G/DL (ref 31.4–37.4)
MCV RBC AUTO: 93 FL (ref 82–98)
MONOCYTES # BLD AUTO: 0.58 THOUSAND/ÂΜL (ref 0.17–1.22)
MONOCYTES NFR BLD AUTO: 11 % (ref 4–12)
NEUTROPHILS # BLD AUTO: 2.78 THOUSANDS/ÂΜL (ref 1.85–7.62)
NEUTS SEG NFR BLD AUTO: 55 % (ref 43–75)
NRBC BLD AUTO-RTO: 0 /100 WBCS
PLATELET # BLD AUTO: 194 THOUSANDS/UL (ref 149–390)
PMV BLD AUTO: 9.1 FL (ref 8.9–12.7)
POTASSIUM SERPL-SCNC: 3.9 MMOL/L (ref 3.5–5.3)
PROT UR-MCNC: 849 MG/DL
PROT/CREAT UR: 18.87 MG/G{CREAT} (ref 0–0.1)
RBC # BLD AUTO: 2.81 MILLION/UL (ref 3.88–5.62)
SODIUM SERPL-SCNC: 141 MMOL/L (ref 136–145)
TIBC SERPL-MCNC: 204 UG/DL (ref 250–450)
UNIT DISPENSE STATUS: NORMAL
UNIT PRODUCT CODE: NORMAL
UNIT PRODUCT VOLUME: 350 ML
UNIT RH: NORMAL
VANCOMYCIN SERPL-MCNC: 24.7 UG/ML (ref 10–20)
VIT B12 SERPL-MCNC: 329 PG/ML (ref 100–900)
WBC # BLD AUTO: 5.11 THOUSAND/UL (ref 4.31–10.16)

## 2022-04-23 PROCEDURE — 80048 BASIC METABOLIC PNL TOTAL CA: CPT

## 2022-04-23 PROCEDURE — 80202 ASSAY OF VANCOMYCIN: CPT

## 2022-04-23 PROCEDURE — 85025 COMPLETE CBC W/AUTO DIFF WBC: CPT

## 2022-04-23 PROCEDURE — 85014 HEMATOCRIT: CPT

## 2022-04-23 PROCEDURE — 94660 CPAP INITIATION&MGMT: CPT

## 2022-04-23 PROCEDURE — 94760 N-INVAS EAR/PLS OXIMETRY 1: CPT

## 2022-04-23 PROCEDURE — 84156 ASSAY OF PROTEIN URINE: CPT | Performed by: INTERNAL MEDICINE

## 2022-04-23 PROCEDURE — 85018 HEMOGLOBIN: CPT

## 2022-04-23 PROCEDURE — C9113 INJ PANTOPRAZOLE SODIUM, VIA: HCPCS | Performed by: STUDENT IN AN ORGANIZED HEALTH CARE EDUCATION/TRAINING PROGRAM

## 2022-04-23 PROCEDURE — 83735 ASSAY OF MAGNESIUM: CPT | Performed by: STUDENT IN AN ORGANIZED HEALTH CARE EDUCATION/TRAINING PROGRAM

## 2022-04-23 PROCEDURE — 99232 SBSQ HOSP IP/OBS MODERATE 35: CPT | Performed by: INTERNAL MEDICINE

## 2022-04-23 PROCEDURE — 82570 ASSAY OF URINE CREATININE: CPT | Performed by: INTERNAL MEDICINE

## 2022-04-23 PROCEDURE — 99223 1ST HOSP IP/OBS HIGH 75: CPT | Performed by: INTERNAL MEDICINE

## 2022-04-23 RX ORDER — VANCOMYCIN HYDROCHLORIDE 1 G/200ML
10 INJECTION, SOLUTION INTRAVENOUS DAILY PRN
Status: DISCONTINUED | OUTPATIENT
Start: 2022-04-23 | End: 2022-04-23

## 2022-04-23 RX ORDER — AMLODIPINE BESYLATE 5 MG/1
5 TABLET ORAL DAILY
Status: DISCONTINUED | OUTPATIENT
Start: 2022-04-24 | End: 2022-04-27

## 2022-04-23 RX ORDER — ACETAMINOPHEN 325 MG/1
650 TABLET ORAL EVERY 6 HOURS PRN
Status: DISCONTINUED | OUTPATIENT
Start: 2022-04-23 | End: 2022-04-29 | Stop reason: HOSPADM

## 2022-04-23 RX ORDER — METOPROLOL SUCCINATE 50 MG/1
100 TABLET, EXTENDED RELEASE ORAL DAILY
Status: DISCONTINUED | OUTPATIENT
Start: 2022-04-24 | End: 2022-04-29 | Stop reason: HOSPADM

## 2022-04-23 RX ADMIN — FLECAINIDE ACETATE 100 MG: 50 TABLET ORAL at 08:16

## 2022-04-23 RX ADMIN — METHOCARBAMOL TABLETS 750 MG: 500 TABLET, COATED ORAL at 21:25

## 2022-04-23 RX ADMIN — ACETAMINOPHEN 650 MG: 325 TABLET ORAL at 00:54

## 2022-04-23 RX ADMIN — GABAPENTIN 100 MG: 100 CAPSULE ORAL at 08:17

## 2022-04-23 RX ADMIN — FLECAINIDE ACETATE 100 MG: 50 TABLET ORAL at 16:40

## 2022-04-23 RX ADMIN — ACETAMINOPHEN 650 MG: 325 TABLET ORAL at 21:24

## 2022-04-23 RX ADMIN — PANTOPRAZOLE SODIUM 40 MG: 40 INJECTION, POWDER, FOR SOLUTION INTRAVENOUS at 21:23

## 2022-04-23 RX ADMIN — AMLODIPINE BESYLATE 5 MG: 5 TABLET ORAL at 08:17

## 2022-04-23 RX ADMIN — DOCUSATE SODIUM 100 MG: 100 CAPSULE, LIQUID FILLED ORAL at 08:16

## 2022-04-23 RX ADMIN — POTASSIUM CHLORIDE 40 MEQ: 20 TABLET, EXTENDED RELEASE ORAL at 08:16

## 2022-04-23 RX ADMIN — METOPROLOL SUCCINATE 100 MG: 50 TABLET, EXTENDED RELEASE ORAL at 08:17

## 2022-04-23 RX ADMIN — PRAVASTATIN SODIUM 40 MG: 40 TABLET ORAL at 16:40

## 2022-04-23 RX ADMIN — ALBUMIN (HUMAN) 25 G: 0.25 INJECTION, SOLUTION INTRAVENOUS at 08:16

## 2022-04-23 RX ADMIN — GABAPENTIN 300 MG: 300 CAPSULE ORAL at 21:23

## 2022-04-23 RX ADMIN — TAMSULOSIN HYDROCHLORIDE 0.4 MG: 0.4 CAPSULE ORAL at 16:40

## 2022-04-23 RX ADMIN — DULOXETINE 60 MG: 30 CAPSULE, DELAYED RELEASE ORAL at 08:16

## 2022-04-23 RX ADMIN — PANTOPRAZOLE SODIUM 40 MG: 40 INJECTION, POWDER, FOR SOLUTION INTRAVENOUS at 08:16

## 2022-04-23 RX ADMIN — OXYCODONE HYDROCHLORIDE AND ACETAMINOPHEN 500 MG: 500 TABLET ORAL at 08:17

## 2022-04-23 RX ADMIN — ONDANSETRON 4 MG: 2 INJECTION INTRAMUSCULAR; INTRAVENOUS at 16:40

## 2022-04-23 RX ADMIN — DAPTOMYCIN 950 MG: 500 INJECTION, POWDER, LYOPHILIZED, FOR SOLUTION INTRAVENOUS at 17:46

## 2022-04-23 RX ADMIN — DOCUSATE SODIUM 100 MG: 100 CAPSULE, LIQUID FILLED ORAL at 16:40

## 2022-04-23 RX ADMIN — ACETAMINOPHEN 650 MG: 325 TABLET ORAL at 06:27

## 2022-04-23 NOTE — PROGRESS NOTES
[place]  Progress Note - Praveen Hill 1950, 71 y o  male MRN: [id number]  Unit/Bed#: -01 Encounter: [id number]  Primary Care Provider: Gricel Turk MD   Date and time admitted to hospital: 4/22/2022  2:41 PM    * JANEL (acute kidney injury) [facility])  Assessment & Plan  Patient presents volume overload, found to have acute anemia, hypoalbuminemia, proteinuria and JANEL with creatinine    Patient has been receiving vancomycin IV as outpatient for surgical site infection  Urinary retention protocol  Minimal improvement in anasarca with IV Lasix and albumin over the last 24 hours however creatinine worsening  Will discontinue IV Lasix and continue albumin  Consult Nephrology  Will also consult Infectious Disease for alternative antibiotic  Avoid NSAIDs, hypotension, nephrotoxic agents    Surgical site infection  Assessment & Plan  Pt is 6 weeks status post cervical fusion complicated by MRSA infection   He is currently receiving home IV vancomycin Q 24 hours, with plan to continue for total of 28 days followed by another for 28 days of p o  Today is day #23/28 of IV vancomycin  Continue while inpatient  Pharmacy consult for management  Suspicion for vanco contributing to renal failure, will consult Infectious Disease as patient likely needs change in antibiotic    Ambulatory dysfunction  Assessment & Plan  Patient presents with generalized weakness, difficulty with ADLs  Suspect secondary to volume overload/anasarca, deconditioning from recent hospitalization and anemia  Improved slightly with transfusion  PT/OT consult  Case management consult for likely placement assistance     Anemia  Assessment & Plan  New normocytic anemia  Previously around 13 prior to surgery in March  May be due to acute illness and renal failure    CBC showing: Hgb 6 6/Hct 20 9/MCV 95-normal  Received 1 unit PRBCs 4/22 for hemoglobin of 6 6  Transfuse if Hgb <7  B12 and folate low normal  Iron panel- iron saturation 20%, TIBC 204, iron 41    Mixed hyperlipidemia  Assessment & Plan  Continue statin    Paroxysmal A-fib (HCC)  Assessment & Plan  Continue metoprolol 100 mg and flecainide  Holding warfarin due to anemia requiring transfusion  If hemoglobin remains stable and no sign of GI bleeding will resume Coumadin on Sunday    Depression, major, single episode, moderate ([facility] [address] )  Assessment & Plan  Continue Cymbalta    Factor V Leiden mutation [facility])  Assessment & Plan  Holding home warfarin in setting of anemia requiring trasnfusion  At home on coumadin 5 mg on Wednesday and Saturday, 2 5 mg all other days  Continue mechanical VTE prophylaxis  Consider resuming Coumadin in the next 24 hours once GI bleeding ruled out    WILL (obstructive sleep apnea)  Assessment & Plan  CPAP at bedtime    Essential hypertension  Assessment & Plan  Currently on amlodipine 5 mg and metoprolol 100 mg daily - change hold parameters  Patient also on Albumin/IV Lasix 40 mg b i d       VTE Pharmacologic Prophylaxis: VTE Score: 10 High Risk (Score >/= 5) - Pharmacological DVT Prophylaxis Contraindicated  Sequential Compression Devices Ordered  Patient Centered Rounds: I performed bedside rounds with nursing staff today  Discussions with Specialists or Other Care Team Provider: nursing     Education and Discussions with Family / Patient: will update wife when she is at bedside   Time Spent for Care: 45 minutes  More than 50% of total time spent on counseling and coordination of care as described above  Current Length of Stay: 1 day(s)  Current Patient Status: Inpatient   Certification Statement: The patient will continue to require additional inpatient hospital stay due to pending improvement in renal failure   Discharge Plan: Anticipate discharge in >72 hrs to home  Code Status: Level 3 - DNAR and DNI    Subjective:   Pt seen and examined at bedside  Thinks the swelling may have improved a little bit    Notes increased urination  Definitely feels less weak than yesterday  No dizziness or lightheadedness  Objective:     Vitals:   Temp (24hrs), Av °F (36 7 °C), Min:97 3 °F (36 3 °C), Max:98 3 °F (36 8 °C)    Temp:  [97 3 °F (36 3 °C)-98 3 °F (36 8 °C)] 98 3 °F (36 8 °C)  HR:  [58-65] 58  Resp:  [17-20] 17  BP: (150-217)/() 160/83  SpO2:  [91 %-98 %] 91 %  Body mass index is 36 13 kg/m²  Input and Output Summary (last 24 hours): Intake/Output Summary (Last 24 hours) at 2022 1016  Last data filed at 2022 0601  Gross per 24 hour   Intake 572 ml   Output 1875 ml   Net -1303 ml       Physical Exam:   Physical Exam  Constitutional:       Appearance: Normal appearance  He is obese  HENT:      Head: Normocephalic and atraumatic  Mouth/Throat:      Mouth: Mucous membranes are dry  Eyes:      Comments: Periorbital swelling   Neck:      Comments: Cervical spine collar in place  Cardiovascular:      Rate and Rhythm: Normal rate and regular rhythm  Pulmonary:      Effort: Pulmonary effort is normal       Breath sounds: Normal breath sounds  Abdominal:      General: Bowel sounds are normal       Palpations: Abdomen is soft  Musculoskeletal:         General: Swelling present  Normal range of motion  Right lower leg: Edema present  Left lower leg: Edema present  Comments: Also with pitting edema bilateral upper extremities right greater than left   Neurological:      General: No focal deficit present  Mental Status: He is alert     Psychiatric:         Mood and Affect: Mood normal       Comments: Flat affect depressed mood       Additional Data:     Labs:  Results from last 7 days   Lab Units 22  0617   WBC Thousand/uL 5 11   HEMOGLOBIN g/dL 8 2*   HEMATOCRIT % 26 1*   PLATELETS Thousands/uL 194   NEUTROS PCT % 55   LYMPHS PCT % 26   MONOS PCT % 11   EOS PCT % 5     Results from last 7 days   Lab Units 22  0617 22  1522 22  1522   SODIUM mmol/L 141   < > 140 POTASSIUM mmol/L 3 9   < > 4 2   CHLORIDE mmol/L 107   < > 105   CO2 mmol/L 27   < > 27   BUN mg/dL 30*   < > 30*   CREATININE mg/dL 2 79*   < > 2 65*   ANION GAP mmol/L 7   < > 8   CALCIUM mg/dL 8 2*   < > 8 2*   ALBUMIN g/dL  --   --  1 9*   GLUCOSE RANDOM mg/dL 93   < > 107    < > = values in this interval not displayed  Results from last 7 days   Lab Units 04/22/22 1953   INR  1 95*                   Lines/Drains:  Invasive Devices  Report    Peripherally Inserted Central Catheter Line            PICC Line 44/99/67 Right Basilic 20 days                Central Line:  Goal for removal: N/A - Discharging with PICC for IV ABX/medications         Telemetry:  Telemetry Orders (From admission, onward)             48 Hour Telemetry Monitoring  Continuous x 48 hours        References:    Telemetry Guidelines   Question:  Reason for 48 Hour Telemetry  Answer:  Acute Decompensated CHF (continuous diuretic infusion or total diuretic dose > 200 mg daily, associated electrolyte derangement, ionotropic drip, history of ventricular arrhythmia, or new EF <35%)                 Telemetry Reviewed: Sinus Bradycardia  Indication for Continued Telemetry Use: No indication for continued use  Will discontinue                Imaging: Reviewed radiology reports from this admission including: chest xray    Recent Cultures (last 7 days):         Last 24 Hours Medication List:   Current Facility-Administered Medications   Medication Dose Route Frequency Provider Last Rate    acetaminophen  650 mg Oral Q6H PRN Barbara Chirinos PA-C      albumin human  25 g Intravenous Daily Marya Howard MD      aluminum-magnesium hydroxide-simethicone  30 mL Oral Q6H PRN Antonio Neumann PA-C      [START ON 4/24/2022] amLODIPine  5 mg Oral Daily García Conner PA-C      docusate sodium  100 mg Oral BID Antonio Neumann PA-C      DULoxetine  60 mg Oral Daily Antonio Neumann PA-C      flecainide  100 mg Oral BID Antonio Neumann PA-C      gabapentin  100 mg Oral QAM Alma Raymond, FABIOLA      gabapentin  300 mg Oral HS Alma Raymond, FABIOLA      methocarbamol  750 mg Oral Q6H PRN Alma Raymond PA-C      [START ON 4/24/2022] metoprolol succinate  100 mg Oral Daily García Conner PA-C      ondansetron  4 mg Intravenous Q6H PRN Alma Raymond, FABIOLA      oxyCODONE  5 mg Oral Q4H PRN Alma Raymond, FABIOLA      pantoprazole  40 mg Intravenous Q12H [facility] Serina Harper MD      polyethylene glycol  17 g Oral Daily PRN Alma Raymond, FABIOLA      potassium chloride  40 mEq Oral Daily Alma Raymond, FABIOLA      pravastatin  40 mg Oral Daily With Merry Guzman PA-C      tamsulosin  0 4 mg Oral Daily With Merry Guzman PA-C      vancomycin  10 mg/kg (Adjusted) Intravenous Daily PRN Serina Harper MD          Today, Patient Was Seen By: Dago Dash PA-C    **Please Note: This note may have been constructed using a voice recognition system  **

## 2022-04-23 NOTE — UTILIZATION REVIEW
Initial Clinical Review    Admission: Date/Time/Statement:   Admission Orders (From admission, onward)     Ordered        04/22/22 1638  Inpatient Admission  Once                      Orders Placed This Encounter   Procedures    Inpatient Admission     Standing Status:   Standing     Number of Occurrences:   1     Order Specific Question:   Level of Care     Answer:   Med Surg [16]     Order Specific Question:   Estimated length of stay     Answer:   More than 2 Midnights     Order Specific Question:   Certification     Answer:   I certify that inpatient services are medically necessary for this patient for a duration of greater than two midnights  See H&P and MD Progress Notes for additional information about the patient's course of treatment  ED Arrival Information     Expected Arrival Acuity    - 4/22/2022 14:07 Urgent         Means of arrival Escorted by Service Admission type    Walk-In Family Member Hospitalist Urgent         Arrival complaint    Abnormal Laboratory        Chief Complaint   Patient presents with    Weakness - Generalized     To ED for blood work per patient  States that his infectious disease doctor wants blood work  No fever or chills, no diarrhea  Initial Presentation: 71 y o  male from home presented to the ED as directed by OP ID d/t generalized weakness, poor appetite and nausea  PMH of htn, chronic pain, afib, factor 5 Leiden, hyperlipidemia, anemia  Pt was recently discharged on 4/15 s/p cervical fusion complicated by MRSA infection and getting IV vancomycin at home  He also reports LE swelling that started yesterday and worse today  In the ED,  found to have bilateral lower and upper extremity edema, JANEL, hemoglobin 6 7 (baseline around 7)  BNP elevated to 3948  EKG sinus bradycardia - rate 57  Plan: Inpatient admission for evaluation and treatment of acute CHF, JANEL, anemia, ambulatory dysfunction: Diuresis with 40 mg IV Lasix b i d   Telemetry, Continue to monitor electrolytes and replete as needed, Daily weights, Strict I & Os  Cardiac diet, low sodium <2g, fluid restriction <1500  Repeat ECHO  trend CBC q8h  Cont IV Vanco  check UA  Check Albumin  Date: 04/23   Day 2:   IM Notes: Pt reports swelling has improved  Feeling less weak than yesterday  He has minimal improvement in anasarca with IV Lasix and albumin over the last 24 hours however creatinine worsening  Will discontinue IV Lasix and continue albumin  Consult Nephrology  Consult Infectious Disease for alternative antibiotic  On IV Vanco 23/28  Received 1 u prbc 04/22, hb 8 2 after  If hemoglobin remains stable and no sign of GI bleeding will resume Coumadin on Sunday  PE: Pt is alert with no focal deficit present  Flat affect, depressed mnood  Cervical spine collar in place  Periorbital swelling present  B/l pitting edema in UE R>L, LE edema present   Nephrology following      ED Triage Vitals [04/22/22 1427]   Temperature Pulse Respirations Blood Pressure SpO2   (!) 97 3 °F (36 3 °C) 60 20 (!) 197/86 98 %      Temp Source Heart Rate Source Patient Position - Orthostatic VS BP Location FiO2 (%)   Temporal Monitor Sitting Left arm --      Pain Score       No Pain          Wt Readings from Last 1 Encounters:   04/23/22 118 kg (259 lb 0 7 oz)     Additional Vital Signs:   Date/Time Temp Pulse Resp BP MAP (mmHg) SpO2 O2 Device O2 Interface Device Patient Position - Orthostatic VS   04/23/22 15:34:44 97 9 °F (36 6 °C) 57 20 160/83 109 96 % -- -- --   04/23/22 1226 -- -- -- -- -- -- None (Room air) -- --   04/23/22 07:38:31 98 3 °F (36 8 °C) 58 -- 160/83 109 91 % -- -- --   04/23/22 06:11:51 -- 65 17 165/85 112 93 % -- -- --   04/23/22 0047 -- -- -- -- -- 96 % -- Face mask --   04/23/22 00:22:10 97 6 °F (36 4 °C) 62 18 150/66 94 93 % -- -- --   04/22/22 22:40:47 98 1 °F (36 7 °C) 62 18 168/89 115 93 % -- -- --   04/22/22 22:10:55 98 3 °F (36 8 °C) 59 18 173/92 Abnormal  119 93 % -- -- --   04/22/22 19:14:14 -- 58 -- 174/94 Abnormal  121 95 % -- -- --   04/22/22 19:07:03 -- 59 -- 189/98 Abnormal  128 96 % -- -- --   04/22/22 17:01:33 98 3 °F (36 8 °C) 58 18 217/104 Abnormal  142 97 % -- -- --       Pertinent Labs/Diagnostic Test Results:   XR chest 1 view portable   ED Interpretation by Lulu Benoit DO (04/22 1537)   ED Provider X-ray Interpretation    My X-ray interpretation of the Chest: was negative for infiltrate, effusion, pneumothorax, or wide mediastinum    Lulu Benoit DO      Final Result by Alize Manrique MD (04/22 2039)   Addendum 1 of 1 by Alize Manrique MD (04/22 2039)   ADDENDUM:      PICC line tip projects over the region of the mid SVC  Final      No acute cardiopulmonary disease                    Workstation performed: [id number]           04/22 EKG result: Sinus bradycardia with 1st degree A-V block  Right bundle branch block      Results from last 7 days   Lab Units 04/23/22  1404 04/23/22  0617 04/23/22  0225 04/22/22  1522 04/18/22  1500   WBC Thousand/uL  --  5 11  --  6 34 5 88   HEMOGLOBIN g/dL 8 1* 8 2* 8 2* 6 6* 7 9*   HEMATOCRIT % 25 9* 26 1* 25 9* 20 9* 25 5*   PLATELETS Thousands/uL  --  194  --  241 223   NEUTROS ABS Thousands/µL  --  2 78  --  3 76 3 45         Results from last 7 days   Lab Units 04/23/22  0617 04/22/22  1522 04/18/22  1500   SODIUM mmol/L 141 140 138   POTASSIUM mmol/L 3 9 4 2 4 7   CHLORIDE mmol/L 107 105 105   CO2 mmol/L 27 27 28   ANION GAP mmol/L 7 8 5   BUN mg/dL 30* 30* 26*   CREATININE mg/dL 2 79* 2 65* 1 88*   EGFR ml/min/1 73sq m 21 23 35   CALCIUM mg/dL 8 2* 8 2* 7 9*   MAGNESIUM mg/dL 1 8  --   --      Results from last 7 days   Lab Units 04/22/22  1522   ALBUMIN g/dL 1 9*         Results from last 7 days   Lab Units 04/23/22  0617 04/22/22  1522 04/18/22  1500   GLUCOSE RANDOM mg/dL 93 107 85           Results from last 7 days   Lab Units 04/22/22 1953 04/22/22  1522   HS TNI 0HR ng/L  --  7   HS TNI 4HR ng/L 7  --    HSTNI D4 ng/L 0  --          Results from last 7 days   Lab Units 04/22/22 1953 04/18/22  1500   PROTIME seconds 21 8* 32 0*   INR  1 95* 3 22*     Results from last 7 days   Lab Units 04/22/22 1953   TSH 3RD GENERATON uIU/mL 2 963                     Results from last 7 days   Lab Units 04/22/22  1522   NT-PRO BNP pg/mL 3,948*     Results from last 7 days   Lab Units 04/22/22  1522   FERRITIN ng/mL 521*             Results from last 7 days   Lab Units 04/18/22  1500   CRP mg/L 21 8*             Results from last 7 days   Lab Units 04/22/22  1915   CLARITY UA  Slightly Cloudy   COLOR UA  Pink   SPEC GRAV UA  1 020   PH UA  7 0   GLUCOSE UA mg/dl Negative   KETONES UA mg/dl Negative   BLOOD UA  Large*   PROTEIN UA mg/dl >=300*   NITRITE UA  Negative   BILIRUBIN UA  Negative   UROBILINOGEN UA E U /dl 0 2   LEUKOCYTES UA  Negative   WBC UA /hpf 0-1   RBC UA /hpf Innumerable*   BACTERIA UA /hpf Occasional   EPITHELIAL CELLS WET PREP /hpf None Seen       Past Medical History:   Diagnosis Date    Acute venous embolism and thrombosis of deep vessels of proximal lower extremity (HCC)     Last assessed: 5/18/15    Arthritis     Cellulitis     LE    CPAP (continuous positive airway pressure) dependence     [facility] Leiden [facility]     Forgetfulness     [place] (hard of hearing)     Paroxysmal atrial fibrillation (HCC)     Last assessed: 11/2/15    Sleep apnea      Present on Admission:   Anemia   Essential hypertension   Factor V Leiden mutation ([facility]   Paroxysmal A-fib ([facility]   Surgical site infection   Depression, major, single episode, moderate (HCC)   WILL (obstructive sleep apnea)   Mixed hyperlipidemia      Admitting Diagnosis: Weakness [R53 1]  Anemia [D64 9]  Acute renal insufficiency [N28 9]  Pedal edema [R60 0]  Age/Sex: 71 y o  male  Admission Orders:  SCD  PT/OT  Daily weight  I/O  Measure post void residual    Scheduled Medications:  albumin human, 25 g, Intravenous, Daily  [START ON 4/24/2022] amLODIPine, 5 mg, Oral, Daily  DAPTOmycin, 8 mg/kg, Intravenous, Q48H  docusate sodium, 100 mg, Oral, BID  DULoxetine, 60 mg, Oral, Daily  flecainide, 100 mg, Oral, BID  gabapentin, 100 mg, Oral, QAM  gabapentin, 300 mg, Oral, HS  [START ON 4/24/2022] metoprolol succinate, 100 mg, Oral, Daily  pantoprazole, 40 mg, Intravenous, Q12H [facility]  potassium chloride, 40 mEq, Oral, Daily  pravastatin, 40 mg, Oral, Daily With Dinner  tamsulosin, 0 4 mg, Oral, Daily With Dinner      Continuous IV Infusions: none     PRN Meds:  acetaminophen, 650 mg, Oral, Q6H PRN 04/23 x 2  aluminum-magnesium hydroxide-simethicone, 30 mL, Oral, Q6H PRN  methocarbamol, 750 mg, Oral, Q6H PRN  ondansetron, 4 mg, Intravenous, Q6H PRN 04/22 x 1, 04/23 x 1  oxyCODONE, 5 mg, Oral, Q4H PRN  polyethylene glycol, 17 g, Oral, Daily PRN        IP CONSULT TO CASE MANAGEMENT  IP CONSULT TO NEPHROLOGY  IP CONSULT TO INFECTIOUS DISEASES    Network Utilization Review Department  ATTENTION: Please call with any questions or concerns to [phone] and carefully listen to the prompts so that you are directed to the right person  All voicemails are confidential   Kellie Amrit all requests for admission clinical reviews, approved or denied determinations and any other requests to dedicated fax number below belonging to the campus where the patient is receiving treatment   List of dedicated fax numbers for the Facilities:  [address] DENIALS (Administrative/Medical Necessity) [phone]   [address] [place] [address] (Maternity/NICU/Pediatrics) [phone]   [address] [phone]   [address]  [address] [facility] [place] [id number] [facility] [phone] 187 [facility] [place] [address] 6039 [facility] [phone]

## 2022-04-23 NOTE — CONSULTS
Consultation - Nephrology   Quincy White 71 y o  male MRN: [id number]  Unit/Bed#: -01 Encounter: [id number]      Assessment/Plan:  1  Acute kidney injury, etiology likely multifactorial, could be secondary to vancomycin toxicity, post infectious GN, component prerenal azotemia  2  Recent MRSA wound infection  Was discharged on continuation of IV vancomycin  Had received 23/28 days  3  Microscopic hematuria, could represent post infectious GN, would recommend checking complements  4  Elevated vancomycin trough most recently 27 9  5  Grade 2 diastolic dysfunction with previous ejection fraction 55%, repeat echocardiogram is pending  6  Acute on chronic anemia, status post PRBC transfusion    Plan:  · Again etiology for renal failure likely multifactorial, could be secondary to vancomycin with elevated trough  Given hematuria cannot rule out a component from post infectious GN  · Volume status overall appears stable, agree with discontinuing diuretic therapy  · Okay to continue with albumin  · Would check complements, protein creatinine ratio  · Discussed with family at length  If no significant improvement or etiology remains unclear may need to consider renal biopsy  History of Present Illness   Physician Requesting Consult: Gilbert Mcguire MD  Reason for Consult / Principal Problem:  Acute kidney injury  HPI: Quincy White is a 71y o  year old male who presents with weakness    Patient is a 50-year-old male with a past medical history significant for hypertension, chronic pain, atrial fibrillation, factor 5 Leiden deficiency, dyslipidemia as well as chronic anemia presents with generalized weakness  Recent admission due to cervical fusion which was complicated by MRSA infection  Was discharged home with 6 weeks of vancomycin treatment      Laboratory studies reviewed, renal function remains stable until approximately 414 when creatinine started to rise at 1 1--1 8--presentation 2 65 and today 2 79    Currently complains of fatigue weakness  Nausea  Poor appetite and relative anorexia  Some abdominal bloating  History obtained from chart review and the patient    Review of Systems   Constitutional: Positive for activity change and appetite change  Respiratory: Negative for chest tightness and shortness of breath  Cardiovascular: Positive for leg swelling  Negative for chest pain  Gastrointestinal: Positive for abdominal distention  Negative for abdominal pain  Genitourinary: Positive for hematuria  Negative for difficulty urinating  Musculoskeletal: Positive for back pain  Neurological: Negative for dizziness and syncope         Pertinent findings of a 10 point review of systems noted above otherwise all others negative    Historical Information   Patient Active Problem List   Diagnosis    Status post catheter ablation of atrial flutter    Essential hypertension    WILL (obstructive sleep apnea)    [facility]iden mutation ([facility]    Erectile dysfunction    Depression, major, single episode, moderate ([facility])    Insomnia    Lumbar herniated disc    Primary osteoarthritis of left knee    Primary osteoarthritis of right knee    Paroxysmal A-fib ([facility] )    Lower extremity edema    Mixed hyperlipidemia    History of DVT of lower extremity    IFG (impaired fasting glucose)    Pes anserinus bursitis of right knee    Weakness    Cervical myelopathy ([facility])    Sinus bradycardia    Goals of care, counseling/discussion    Muscle spasm    Hyponatremia    Head pain cephalgia    Anemia    Surgical site infection    Status post cervical spinal fusion    Great toe pain, right    Acute CHF (congestive heart failure) ([facility])    Ambulatory dysfunction    JANEL (acute kidney injury) ([facility] )     Past Medical History:   Diagnosis Date    Acute venous embolism and thrombosis of deep vessels of proximal lower extremity ([facility])     Last assessed: 5/18/15    Arthritis     Cellulitis     LE    CPAP (continuous positive airway pressure) dependence     [facility]iden [facility]     Forgetfulness     [place] (hard of hearing)     Paroxysmal atrial fibrillation ([facility] )     Last assessed: 11/2/15    Sleep apnea      Past Surgical History:   Procedure Laterality Date    BACK SURGERY      Lower    COLON SURGERY      COLONOSCOPY      INCISION AND DRAINAGE POSTERIOR SPINE N/A 4/1/2022    Procedure: Posterior cervical evacuation of postoperative collection and debridement with placement of drains C3-T1; Surgeon: Jeremy Clifford MD;  [facility]: [facility];  Service: Neurosurgery    [place] ARTHRODESIS ANT INTERBODY MIN DISCECTOMY, CERVICAL BELOW C2 N/A 3/11/2022    Procedure: Anterior cervical discectomy and fixation fusion C5/6 and C6/7; Posterior cervical decompression and instrumented fusion C3-T1;   Surgeon: Jeremy Clifford MD;  [facility];  Service: Neurosurgery     Social History   Social History     Substance and Sexual Activity   Alcohol Use Not Currently     Social History     Substance and Sexual Activity   Drug Use No     Social History     Tobacco Use   Smoking Status Never Smoker   Smokeless Tobacco Never Used     Family History   Problem Relation Age of Onset    Lung cancer Mother     No Known Problems Father     Heart attack Brother     Atrial fibrillation Brother     Emphysema Sister        Meds/Allergies   current meds:   Current Facility-Administered Medications   Medication Dose Route Frequency    acetaminophen (TYLENOL) tablet 650 mg  650 mg Oral Q6H PRN    albumin human (FLEXBUMIN) 25 % injection 25 g  25 g Intravenous Daily    aluminum-magnesium hydroxide-simethicone (MYLANTA) oral suspension 30 mL  30 mL Oral Q6H PRN    [START ON 4/24/2022] amLODIPine (NORVASC) tablet 5 mg  5 mg Oral Daily    DAPTOmycin (CUBICIN) 950 mg in sodium chloride 0 9 % 50 mL IVPB  8 mg/kg Intravenous Q48H    docusate sodium (COLACE) capsule 100 mg  100 mg Oral BID    DULoxetine (CYMBALTA) delayed release capsule 60 mg  60 mg Oral Daily    flecainide (TAMBOCOR) tablet 100 mg  100 mg Oral BID    gabapentin (NEURONTIN) capsule 100 mg  100 mg Oral QAM    gabapentin (NEURONTIN) capsule 300 mg  300 mg Oral HS    methocarbamol (ROBAXIN) tablet 750 mg  750 mg Oral Q6H PRN    [START ON 4/24/2022] metoprolol succinate (TOPROL-XL) 24 hr tablet 100 mg  100 mg Oral Daily    ondansetron (ZOFRAN) injection 4 mg  4 mg Intravenous Q6H PRN    oxyCODONE (ROXICODONE) IR tablet 5 mg  5 mg Oral Q4H PRN    pantoprazole (PROTONIX) injection 40 mg  40 mg Intravenous Q12H [facility]    polyethylene glycol (MIRALAX) packet 17 g  17 g Oral Daily PRN    potassium chloride (K-DUR,KLOR-CON) CR tablet 40 mEq  40 mEq Oral Daily    pravastatin (PRAVACHOL) tablet 40 mg  40 mg Oral Daily With Dinner    tamsulosin (FLOMAX) capsule 0 4 mg  0 4 mg Oral Daily With Dinner       Allergies   Allergen Reactions    Morphine GI Intolerance    Vitamin K Other (See Comments)     On coumadin-patient sensitive to vitamin K amounts in meds etc          Objective   /83   Pulse 57   Temp 97 9 °F (36 6 °C)   Resp 20   Ht 5' 11" (1 803 m)   Wt 118 kg (259 lb 0 7 oz)   SpO2 96%   BMI 36 13 kg/m²     Intake/Output Summary (Last 24 hours) at 4/23/2022 1707  Last data filed at 4/23/2022 1536  Gross per 24 hour   Intake 572 ml   Output 2125 ml   Net -1553 ml       Current Weight: Weight - Scale: 118 kg (259 lb 0 7 oz)    Physical Exam  Constitutional:       Appearance: He is obese  He is not ill-appearing or toxic-appearing  HENT:      Head: Normocephalic and atraumatic  Mouth/Throat:      Mouth: Mucous membranes are moist       Pharynx: Oropharynx is clear  Eyes:      General: No scleral icterus  Neck:      Comments: No gross mass noted, patient currently in hard collar  Cardiovascular:      Rate and Rhythm: Normal rate and regular rhythm     Pulmonary:      Effort: Pulmonary effort is normal       Breath sounds: Normal breath sounds  Abdominal:      General: There is no distension  Palpations: Abdomen is soft  Musculoskeletal:         General: No deformity  Left lower leg: Edema present  Skin:     General: Skin is warm and dry  Neurological:      Mental Status: He is alert  Lab Results:    Results from last 7 days   Lab Units 04/23/22  1404 04/23/22  0617 04/23/22  0617   WBC Thousand/uL  --   --  5 11   HEMOGLOBIN g/dL 8 1*   < > 8 2*   HEMATOCRIT % 25 9*   < > 26 1*   PLATELETS Thousands/uL  --   --  194    < > = values in this interval not displayed       Results from last 7 days   Lab Units 04/23/22  0617   POTASSIUM mmol/L 3 9   CHLORIDE mmol/L 107   CO2 mmol/L 27   BUN mg/dL 30*   CREATININE mg/dL 2 79*   CALCIUM mg/dL 8 2*

## 2022-04-23 NOTE — PLAN OF CARE
Problem: METABOLIC, FLUID AND ELECTROLYTES - ADULT  Goal: Electrolytes maintained within normal limits  Description: INTERVENTIONS:  - Monitor labs and assess patient for signs and symptoms of electrolyte imbalances  - Administer electrolyte replacement as ordered  - Monitor response to electrolyte replacements, including repeat lab results as appropriate  - Instruct patient on fluid and nutrition as appropriate  Outcome: Progressing     Problem: INFECTION - ADULT  Goal: Absence or prevention of progression during hospitalization  Description: INTERVENTIONS:  - Assess and monitor for signs and symptoms of infection  - Monitor lab/diagnostic results  - Monitor all insertion sites, i e  indwelling lines, tubes, and drains  - Monitor endotracheal if appropriate and nasal secretions for changes in amount and color  - [place] appropriate cooling/warming therapies per order  - Administer medications as ordered  - Instruct and encourage patient and family to use good hand hygiene technique  - Identify and instruct in appropriate isolation precautions for identified infection/condition  Outcome: Progressing  Goal: Absence of fever/infection during neutropenic period  Description: INTERVENTIONS:  - Monitor WBC    Outcome: Progressing

## 2022-04-23 NOTE — CONSULTS
Consultation - Infectious Disease   Marj Valerio 71 y o  male MRN: [id number]  Unit/Bed#: -01 Encounter: [id number]      Assessment/Plan     Assessment:  72 y/o man with fatigue, JANEL, MRSA surgical site infection / OM    Plan:  1) SSI / OM - suspect that patient has acute kidney injury due to vancomycin  Will change to an alternate anti MRSA agent to complete previously planned course of intravenous therapy, with plans to transition to oral therapy and outpatient follow-up with Dr Jess Patel as previously arranged  Will of course follow pending blood cultures, however low suspicion for recrudescent infection     ===> Will DISCONTINUE VANCOMYCIN   ===> Will START DAPTOMYCIN 8 Mg/Kg  Will add CK level to this morning's lab, and recheck again in a few days  Presently will give this agent q48h, however anticipate changing to daily dosing as patient's kidney function improves     ===> Will follow pending blood cultures, though as discussed above low suspicion for bacteremia  2) Fatigue / weakness - Favor secondary to acute kidney injury, possible adverse drug reaction due to vancomycin  Regardless, will begin patient on alternate therapy to complete previously arranged course as discussed above  Patient already reporting some clinical improvement  3) JANEL - Will dose abx for pt's renal function, and avoid nephrotoxic agents as able  Favor vancomycin rather than progression of infection as cause of acute kidney injury, will change to daptomycin as discussed above    Further management per Nephrology     ===> Discussed w/ 1' team, patient's family at bedside  ===> Will follow     History of Present Illness   Physician Requesting Consult: Vasyl Mosher MD  HPI:  Briefly, this 71-year-old man with past medical history significant for atrial fibrillation, hypertension, hyperlipidemia, and recent (about 6 weeks ago) cervical spinal fusion performed at [facility] (records reviewed), complicated by postoperative MRSA infection presented to the [address] [place] [address] emergency department on 04/22 with past of his infectious diseases physician (Dr Lucy Houston) with complaints of several days of worsening severe lethargy and generalized weakness  Workup at the time of admission was notable for elevated creatinine (2 65 from a baseline of 1 1) at the time of recent (4/14) [facility] discharge, normal white blood cell count (6300), and unremarkable chest x-ray  Patient had been on intravenous vancomycin therapy at home, and per discussion with patient's wife (who was present at the bedside at time of my exam and provided portions of the HPI, ARIEL) he was due to complete therapy this coming Thursday  Physical exam is notable for cervical collar overlying a clean, dry, and intact surgical dressing, as well as mild lower extremity edema bilaterally  Clinical photograph the patient's incision was reviewed at the bedside (provided by patient's family members) and there appears to be some ecchymosis surrounding the incision, however there is no drainage, or erythema  Patient denies fever, chills, nausea, and vomiting presently as well as antecedent to admission  On admission to hospital, patient's outpatient vancomycin was continued  Inpatient consult to Infectious Diseases  Consult performed by: Jr Warner MD  Consult ordered by: Yanni Street PA-C          ROS:  A complete review of systems was done and is negative except as per the HPI       Historical Information   Past Medical History:   Diagnosis Date    Acute venous embolism and thrombosis of deep vessels of proximal lower extremity (HCC)     Last assessed: 5/18/15    Arthritis     Cellulitis     LE    CPAP (continuous positive airway pressure) dependence     Factor V Leiden [facility] )     Forgetfulness     [place] (hard of hearing)     Paroxysmal atrial fibrillation ([facility] )     Last assessed: 11/2/15    Sleep apnea      Past Surgical History:   Procedure Laterality Date    BACK SURGERY      Lower    COLON SURGERY      COLONOSCOPY      INCISION AND DRAINAGE POSTERIOR SPINE N/A 4/1/2022    Procedure: Posterior cervical evacuation of postoperative collection and debridement with placement of drains C3-T1; Surgeon: Camila Lo MD;  Location: [facility];  Service: Neurosurgery    GA ARTHRODESIS ANT INTERBODY MIN DISCECTOMY, CERVICAL BELOW C2 N/A 3/11/2022    Procedure: Anterior cervical discectomy and fixation fusion C5/6 and C6/7; Posterior cervical decompression and instrumented fusion C3-T1; Surgeon: Camila Lo MD;  Location[facility];  Service: Neurosurgery     Social History   Social History     Substance and Sexual Activity   Alcohol Use Not Currently     Social History     Substance and Sexual Activity   Drug Use No     E-Cigarette/Vaping    E-Cigarette Use Never User      E-Cigarette/Vaping Substances     Social History     Tobacco Use   Smoking Status Never Smoker   Smokeless Tobacco Never Used     Family History: non-contributory    Meds/Allergies   all current active meds have been reviewed    Allergies   Allergen Reactions    Morphine GI Intolerance    Vitamin K Other (See Comments)     On coumadin-patient sensitive to vitamin K amounts in meds etc        Objective       Intake/Output Summary (Last 24 hours) at 4/23/2022 1634  Last data filed at 4/23/2022 1536  Gross per 24 hour   Intake 572 ml   Output 2125 ml   Net -1553 ml       Invasive Devices:   PICC Line 02/12/28 Right Basilic (Active)   Reasons to continue PICC Long term antibiotics 04/23/22 1200   Goal for Removal N/A- Discharging with PICC for IV ABX/medications 04/23/22 1200   Line Necessity Reviewed Yes, reviewed with provider 04/23/22 1200   Site Assessment WDL; Clean;Dry 04/23/22 1200   #1 Lumen Color/Status Purple lumen;Blood return noted; Flushed 04/23/22 1200   #2 Lumen Color/Status Red lumen;Blood return noted; Flushed 04/23/22 1200   Dressing Type Chlorhexidine dressing 04/23/22 1200   Dressing Status Clean;Dry; Intact 04/23/22 1200   Dressing Change Due 04/25/22 04/22/22 2100       Physical Exam  Gen: AAO, NAD  HENT: Normocephalic, atraumatic, MMM, no oropharyngeal exudate  Neck: Supple, trachea midline, additional findings per HPI  CV: No m/r/g appreciated, regular rate  Pulm: No resp  Distress, lungs CTAB  Abd: S/NT/ND, no hepatomegaly appreciated  Ext:  1+ LE edema, intact radial pulses  Lymph: No anterior cervical or supraclav  LAD appreciated  Skin: No rash on exposed skin, warm, dry  Psych: Nl  Mood, nl affect  Neuro: Oriented, UE  strength intact     Lab Results: I have personally reviewed pertinent labs  Imaging Studies: I have personally reviewed pertinent reports  and I have personally reviewed pertinent films in PACS  EKG, Pathology, and Other Studies: I have personally reviewed pertinent reports

## 2022-04-23 NOTE — PROGRESS NOTES
Vancomycin Assessment    Emmanuel Ramos is a 71 y o  male who is currently receiving vancomycin 1250mg daily PRN if level <20 for other surgical site infection   Relevant clinical data and objective history reviewed:  Creatinine   Date Value Ref Range Status   04/23/2022 2 79 (H) 0 60 - 1 30 mg/dL Final     Comment:     Standardized to IDMS reference method   04/22/2022 2 65 (H) 0 60 - 1 30 mg/dL Final     Comment:     Standardized to IDMS reference method   04/18/2022 1 88 (H) 0 60 - 1 30 mg/dL Final     Comment:     Standardized to IDMS reference method   10/25/2017 1 24 0 76 - 1 27 mg/dL Final   11/03/2016 1 11 0 76 - 1 27 mg/dL Final   01/23/2015 1 20 0 60 - 1 30 mg/dL Final     Comment:     Standardized to IDMS reference method     Vancomycin Rm   Date Value Ref Range Status   04/23/2022 24 7 (H) 10 0 - 20 0 ug/mL Final     /85   Pulse 65   Temp 97 6 °F (36 4 °C)   Resp 17   Ht 5' 11" (1 803 m)   Wt 118 kg (259 lb 0 7 oz)   SpO2 93%   BMI 36 13 kg/m²   No intake/output data recorded  Lab Results   Component Value Date/Time    BUN 30 (H) 04/23/2022 06:17 AM    BUN 17 11/02/2021 01:02 PM    WBC 5 11 04/23/2022 06:17 AM    WBC 8 43 01/23/2015 04:40 AM    HGB 8 2 (L) 04/23/2022 06:17 AM    HGB 12 9 01/23/2015 04:40 AM    HCT 26 1 (L) 04/23/2022 06:17 AM    HCT 39 5 01/23/2015 04:40 AM    MCV 93 04/23/2022 06:17 AM    MCV 93 01/23/2015 04:40 AM     04/23/2022 06:17 AM     01/23/2015 04:40 AM     Temp Readings from Last 3 Encounters:   04/23/22 97 6 °F (36 4 °C)   04/15/22 97 6 °F (36 4 °C)   03/31/22 (!) 96 4 °F (35 8 °C) (Tympanic)     Vancomycin Days of Therapy: 8    Assessment/Plan  The patient is currently on vancomycin utilizing pulse dosing  Baseline risks associated with therapy include: pre-existing renal impairment and advanced age  The patient is receiving 1250mg daily PRN if level <20 with the most recent vancomycin level being 24 7  Thus, dose will not be given today  Since the Scr is rising (2 65 to 2 79), the pulse dose will be changed to 1000mg daily PRN if random level <20  Pharmacy will continue to follow closely for s/sx of nephrotoxicity, infusion reactions, and appropriateness of therapy  BMP and CBC will be ordered per protocol  Plan for random with AM lab at 0600 on 4/24/22  Pharmacy will continue to follow the patients culture results and clinical progress daily      Emiliana Hernandez, Pharmacist, PharmD, BCPS

## 2022-04-23 NOTE — CASE MANAGEMENT
Case Management Assessment & Discharge Planning Note    Patient name Tawana Cox  Location /-18 MRN [id number]  : 1950 Date 2022       Current Admission Date: 2022  Current Admission Diagnosis:JANEL (acute kidney injury) [facility])   Patient Active Problem List    Diagnosis Date Noted    [facility]estive [facility] ) 2022    Ambulatory dysfunction 2022    JANEL (acute kidney injury) ([facility] ) 2022    Great toe pain, right 04/10/2022    Surgical site infection 2022    Status post cervical spinal fusion 2022    Anemia 2022    Head pain cephalgia 2022    Hyponatremia 2022    Muscle spasm 2022    Goals of care, counseling/discussion 2022    Sinus bradycardia 03/10/2022    Cervical myelopathy ([place] [address] ) 2022    Weakness 2022    Pes anserinus bursitis of right knee 2021    IFG (impaired fasting glucose) 2021    History of DVT of lower extremity 2021    Mixed hyperlipidemia 10/27/2020    Paroxysmal A-fib ([facility] ) 2020    Lower extremity edema 2020    Primary osteoarthritis of left knee 2020    Primary osteoarthritis of right knee 2020    Depression, major, single episode, moderate (HCC) 10/30/2017    Insomnia 03/10/2017    Lumbar herniated disc 03/10/2017    Erectile dysfunction 2016    Status post catheter ablation of atrial flutter 2015    Essential hypertension 2015    WILL (obstructive sleep apnea) 2015    Factor V Leiden mutation ([facility] ) 2014      LOS (days): 1  Geometric Mean LOS (GMLOS) (days):   Days to GMLOS:     OBJECTIVE:  PATIENT READMITTED TO HOSPITAL  Risk of Unplanned Readmission Score: 34         Current admission status: Inpatient  Referral Reason: VNA,Other (Return of care for [facility])    Preferred Pharmacy:   [address]  [address] [facility] [address]  [address] [facility] [address]  [address]  Phone: [phone] Fax: [phone]    [id number] [place], [place] - [facility] [facility]  [address] [facility]  [address] [facility] [address]  Phone: [phone] Fax: [phone]    Primary Care Provider: Mikael Dean MD    Primary Insurance: [address]  Secondary Insurance:     ASSESSMENT:  [address], [address] Representative - Spouse   Primary Phone: [phone] [facility]  Home Phone: [phone]               Advance Directives  Does patient have a [address]?: Yes  Does patient have Advance Directives?: Yes  Advance Directives: Living will,Power of  for health care  Primary Contact: Gwendolyn Valdez (POA not on file)         Readmission Root Cause  30 Day Readmission: Yes  Who directed you to return to the hospital?: Family,PCP  Did you understand whom to contact if you had questions or problems?: Yes  Did you get your prescriptions before you left the hospital?: No  Reason[de-identified] Preference for own pharmacy  Were you able to get your prescriptions filled when you left the hospital?: Yes  Did you take your medications as prescribed?: Yes  Were you able to get to your follow-up appointments?: Yes  During previous admission, was a post-acute recommendation made?: Yes  What post-acute resources were offered?: [facility]  Patient was readmitted due to: Pt readmission from [facility] with Weakness, surgical site infection, JANEL  Action Plan: Pt will return home with his VNA services to return  He was on IV ABX with [place] pta   Will reassess for ABX need and adjust referral as needed    Patient Information  Admitted from[de-identified] Home  Mental Status: Alert  During Assessment patient was accompanied by: Not accompanied during assessment  Assessment information provided by[de-identified] Spouse  Primary Caregiver: Self  Support Systems: Self,Spouse/significant other,Family members  [place] of Residence: [address] [facility] do you live in?: [address] [facility] entry access options   Select all that apply : Stairs  Number of steps to enter home : 5  Do the steps have railings?: Yes  Type of Current Residence: 2 story home  Upon entering residence, is there a bedroom on the main floor (no further steps)?: Yes  Upon entering residence, is there a bathroom on the main floor (no further steps)?: Yes  In the last 12 months, was there a time when you were not able to pay the mortgage or rent on time?: No  In the last 12 months, how many places have you lived?: 1  In the last 12 months, was there a time when you did not have a steady place to sleep or slept in a shelter (including now)?: No  Homeless/housing insecurity resource given?: N/A  Living Arrangements: Lives w/ Spouse/significant other  Is patient a ?: No    Activities of Daily Living Prior to Admission  Functional Status: Independent  Completes ADLs independently?: Yes  Ambulates independently?: Yes  Does patient use assisted devices?: Yes  Assisted Devices (DME) used: Lucy Gallardo  Does patient currently own DME?: Yes  What DME does the patient currently own?: Lucy Gallardo  Does patient have a history of Outpatient Therapy (PT/OT)?: No  Does the patient have a history of Short-Term Rehab?: No  Does patient have a history of HHC?: Yes ([facility] currently)  Does patient currently have [facility]?: Yes    Current Home Health Care  Type of Current Home Care Services: Home PT,Home OT,Nurse visit  Current Home Health Agency[de-identified] [facility] Provider[de-identified] PCP    Patient Information Continued  Income Source: Pension/FCI  Does patient have prescription coverage?: Yes  Within the past 12 months, you worried that your food would run out before you got the money to buy more : Never true  Within the past 12 months, the food you bought just didnt last and you didnt have money to get more : Never true  Food insecurity resource given?: N/A  Does patient receive dialysis treatments?: No  Does patient have a history of substance abuse?: No  Does patient have a history of Mental Health Diagnosis?: No         Means of Transportation  Means of Transport to Appts[de-identified] Drives Self  In the past 12 months, has lack of transportation kept you from medical appointments or from getting medications?: No  In the past 12 months, has lack of transportation kept you from meetings, work, or from getting things needed for daily living?: No  Was application for public transport provided?: N/A        DISCHARGE DETAILS:    Discharge planning discussed with[de-identified] Spoke with pt and he asked cm to call wife  Spoke with wife Elizabeth Lovings of Choice: Yes  Comments - Freedom of Choice: discussed  CM contacted family/caregiver?: Yes  Were Treatment Team discharge recommendations reviewed with patient/caregiver?: Yes  Did patient/caregiver verbalize understanding of patient care needs?: Yes       Contacts  Patient Contacts: James aGrcia  Relationship to Patient[de-identified] Family  Contact Method: Phone  Phone Number: [phone]  Reason/Outcome: Discharge [address]         Is the patient interested in [facility] at discharge?: Yes VIA [facility] [place])  [address] requested[de-identified] [address] Name[de-identified] [address] [facility] Provider[de-identified] PCP  Home Health Services Needed[de-identified] Administration of IV, IM or SC Medications,Evaluate Functional Status and Safety  Homebound Criteria Met[de-identified] Uses an Assist Device (i e  cane, walker, etc)  Supporting Clincal Findings[de-identified] Fatigues Easliy in Short Distances,Limited Endurance    DME Referral Provided  Referral made for DME?: No    Other Referral/Resources/Interventions Provided:  Interventions: [facility]  Referral Comments: Pt is a JOSÉ MIGUEL for [facility]            Discharge Destination Plan[de-identified] Home with [place] at Discharge : Family                                      Additional Comments: Cm spoke with pt briefly  Pt asked that CM call wife  CM spoke with pts wife Bal Rashid   She reports that pt was janae ROBIN for his cervical fusion surgery  PTA [facility] active and [facility] for IV ABX taught to wife for administration  Pt resides in a [address] with [address]  Pt is using a walker since his surgery but typically he is indpt with his ambulation  No other hx of HHC, STR, OP PT/OT  PT does drive but not at this current time  Pts wife will provide trn home  JOSÉ MIGUEL sent to [address] to follow pending ABX needs  Pts wife states that she is his POA but this is not on file   AD is on file

## 2022-04-23 NOTE — PLAN OF CARE
Problem: Potential for Falls  Goal: Patient will remain free of falls  Description: INTERVENTIONS:  - Educate patient/family on patient safety including physical limitations  - Instruct patient to call for assistance with activity   - Consult OT/PT to assist with strengthening/mobility   - Keep Call bell within reach  - Keep bed low and locked with side rails adjusted as appropriate  - Keep care items and personal belongings within reach  - Initiate and maintain comfort rounds  - Make Fall Risk Sign visible to staff  - Offer Toileting every  Hours, in advance of need  - Initiate/Maintain alarm  - Obtain necessary fall risk management equipment:   - Apply yellow socks and bracelet for high fall risk patients  - Consider moving patient to room near nurses station  Outcome: Progressing     Problem: METABOLIC, FLUID AND ELECTROLYTES - ADULT  Goal: Electrolytes maintained within normal limits  Description: INTERVENTIONS:  - Monitor labs and assess patient for signs and symptoms of electrolyte imbalances  - Administer electrolyte replacement as ordered  - Monitor response to electrolyte replacements, including repeat lab results as appropriate  - Instruct patient on fluid and nutrition as appropriate  Outcome: Progressing     Problem: SKIN/TISSUE INTEGRITY - ADULT  Goal: Incision(s), wounds(s) or drain site(s) healing without S/S of infection  Description: INTERVENTIONS  - Assess and document dressing, incision, wound bed, drain sites and surrounding tissue  - Provide patient and family education  - Perform skin care/dressing changes every  Outcome: Progressing     Problem: INFECTION - ADULT  Goal: Absence or prevention of progression during hospitalization  Description: INTERVENTIONS:  - Assess and monitor for signs and symptoms of infection  - Monitor lab/diagnostic results  - Monitor all insertion sites, i e  indwelling lines, tubes, and drains  - Monitor endotracheal if appropriate and nasal secretions for changes in amount and color  - [place] appropriate cooling/warming therapies per order  - Administer medications as ordered  - Instruct and encourage patient and family to use good hand hygiene technique  - Identify and instruct in appropriate isolation precautions for identified infection/condition  Outcome: Progressing  Goal: Absence of fever/infection during neutropenic period  Description: INTERVENTIONS:  - Monitor WBC    Outcome: Progressing

## 2022-04-24 PROBLEM — I50.31 ACUTE DIASTOLIC CONGESTIVE HEART FAILURE (HCC): Status: ACTIVE | Noted: 2022-04-22

## 2022-04-24 LAB
ALBUMIN SERPL BCP-MCNC: 1.8 G/DL (ref 3.5–5)
ALP SERPL-CCNC: 78 U/L (ref 46–116)
ALT SERPL W P-5'-P-CCNC: 10 U/L (ref 12–78)
ANION GAP SERPL CALCULATED.3IONS-SCNC: 8 MMOL/L (ref 4–13)
AST SERPL W P-5'-P-CCNC: 16 U/L (ref 5–45)
BASOPHILS # BLD AUTO: 0.09 THOUSANDS/ÂΜL (ref 0–0.1)
BASOPHILS NFR BLD AUTO: 2 % (ref 0–1)
BILIRUB SERPL-MCNC: 0.5 MG/DL (ref 0.2–1)
BUN SERPL-MCNC: 27 MG/DL (ref 5–25)
C3 SERPL-MCNC: 108 MG/DL (ref 90–180)
CALCIUM ALBUM COR SERPL-MCNC: 10 MG/DL (ref 8.3–10.1)
CALCIUM SERPL-MCNC: 8.2 MG/DL (ref 8.3–10.1)
CHLORIDE SERPL-SCNC: 106 MMOL/L (ref 100–108)
CK SERPL-CCNC: 42 U/L (ref 39–308)
CO2 SERPL-SCNC: 26 MMOL/L (ref 21–32)
CREAT SERPL-MCNC: 2.77 MG/DL (ref 0.6–1.3)
EOSINOPHIL # BLD AUTO: 0.35 THOUSAND/ÂΜL (ref 0–0.61)
EOSINOPHIL NFR BLD AUTO: 8 % (ref 0–6)
ERYTHROCYTE [DISTWIDTH] IN BLOOD BY AUTOMATED COUNT: 14.7 % (ref 11.6–15.1)
GFR SERPL CREATININE-BSD FRML MDRD: 21 ML/MIN/1.73SQ M
GLUCOSE SERPL-MCNC: 96 MG/DL (ref 65–140)
HCT VFR BLD AUTO: 23.2 % (ref 36.5–49.3)
HGB BLD-MCNC: 7.7 G/DL (ref 12–17)
IMM GRANULOCYTES # BLD AUTO: 0.01 THOUSAND/UL (ref 0–0.2)
IMM GRANULOCYTES NFR BLD AUTO: 0 % (ref 0–2)
LYMPHOCYTES # BLD AUTO: 1.12 THOUSANDS/ÂΜL (ref 0.6–4.47)
LYMPHOCYTES NFR BLD AUTO: 25 % (ref 14–44)
MCH RBC QN AUTO: 29.2 PG (ref 26.8–34.3)
MCHC RBC AUTO-ENTMCNC: 33.2 G/DL (ref 31.4–37.4)
MCV RBC AUTO: 88 FL (ref 82–98)
MONOCYTES # BLD AUTO: 0.55 THOUSAND/ÂΜL (ref 0.17–1.22)
MONOCYTES NFR BLD AUTO: 12 % (ref 4–12)
MRSA NOSE QL CULT: NORMAL
NEUTROPHILS # BLD AUTO: 2.4 THOUSANDS/ÂΜL (ref 1.85–7.62)
NEUTS SEG NFR BLD AUTO: 53 % (ref 43–75)
NRBC BLD AUTO-RTO: 0 /100 WBCS
PLATELET # BLD AUTO: 157 THOUSANDS/UL (ref 149–390)
PMV BLD AUTO: 9.5 FL (ref 8.9–12.7)
POTASSIUM SERPL-SCNC: 3.6 MMOL/L (ref 3.5–5.3)
PROT SERPL-MCNC: 5.3 G/DL (ref 6.4–8.2)
RBC # BLD AUTO: 2.64 MILLION/UL (ref 3.88–5.62)
SODIUM SERPL-SCNC: 140 MMOL/L (ref 136–145)
WBC # BLD AUTO: 4.52 THOUSAND/UL (ref 4.31–10.16)

## 2022-04-24 PROCEDURE — C9113 INJ PANTOPRAZOLE SODIUM, VIA: HCPCS | Performed by: STUDENT IN AN ORGANIZED HEALTH CARE EDUCATION/TRAINING PROGRAM

## 2022-04-24 PROCEDURE — 85025 COMPLETE CBC W/AUTO DIFF WBC: CPT | Performed by: STUDENT IN AN ORGANIZED HEALTH CARE EDUCATION/TRAINING PROGRAM

## 2022-04-24 PROCEDURE — 82550 ASSAY OF CK (CPK): CPT | Performed by: INTERNAL MEDICINE

## 2022-04-24 PROCEDURE — 99233 SBSQ HOSP IP/OBS HIGH 50: CPT | Performed by: INTERNAL MEDICINE

## 2022-04-24 PROCEDURE — 99233 SBSQ HOSP IP/OBS HIGH 50: CPT | Performed by: STUDENT IN AN ORGANIZED HEALTH CARE EDUCATION/TRAINING PROGRAM

## 2022-04-24 PROCEDURE — 80053 COMPREHEN METABOLIC PANEL: CPT | Performed by: STUDENT IN AN ORGANIZED HEALTH CARE EDUCATION/TRAINING PROGRAM

## 2022-04-24 PROCEDURE — 86160 COMPLEMENT ANTIGEN: CPT | Performed by: INTERNAL MEDICINE

## 2022-04-24 RX ADMIN — PANTOPRAZOLE SODIUM 40 MG: 40 INJECTION, POWDER, FOR SOLUTION INTRAVENOUS at 09:03

## 2022-04-24 RX ADMIN — METOPROLOL SUCCINATE 100 MG: 50 TABLET, EXTENDED RELEASE ORAL at 09:02

## 2022-04-24 RX ADMIN — TAMSULOSIN HYDROCHLORIDE 0.4 MG: 0.4 CAPSULE ORAL at 15:54

## 2022-04-24 RX ADMIN — POTASSIUM CHLORIDE 40 MEQ: 20 TABLET, EXTENDED RELEASE ORAL at 09:03

## 2022-04-24 RX ADMIN — GABAPENTIN 300 MG: 300 CAPSULE ORAL at 21:40

## 2022-04-24 RX ADMIN — FLECAINIDE ACETATE 100 MG: 50 TABLET ORAL at 18:09

## 2022-04-24 RX ADMIN — FLECAINIDE ACETATE 100 MG: 50 TABLET ORAL at 09:02

## 2022-04-24 RX ADMIN — PRAVASTATIN SODIUM 40 MG: 40 TABLET ORAL at 15:54

## 2022-04-24 RX ADMIN — DOCUSATE SODIUM 100 MG: 100 CAPSULE, LIQUID FILLED ORAL at 18:08

## 2022-04-24 RX ADMIN — METHOCARBAMOL TABLETS 750 MG: 500 TABLET, COATED ORAL at 21:40

## 2022-04-24 RX ADMIN — GABAPENTIN 100 MG: 100 CAPSULE ORAL at 09:03

## 2022-04-24 RX ADMIN — ACETAMINOPHEN 650 MG: 325 TABLET ORAL at 21:40

## 2022-04-24 RX ADMIN — DULOXETINE 60 MG: 30 CAPSULE, DELAYED RELEASE ORAL at 09:03

## 2022-04-24 RX ADMIN — DOCUSATE SODIUM 100 MG: 100 CAPSULE, LIQUID FILLED ORAL at 09:03

## 2022-04-24 RX ADMIN — ALBUMIN (HUMAN) 25 G: 0.25 INJECTION, SOLUTION INTRAVENOUS at 09:02

## 2022-04-24 RX ADMIN — ACETAMINOPHEN 650 MG: 325 TABLET ORAL at 15:54

## 2022-04-24 RX ADMIN — AMLODIPINE BESYLATE 5 MG: 5 TABLET ORAL at 09:02

## 2022-04-24 RX ADMIN — PANTOPRAZOLE SODIUM 40 MG: 40 INJECTION, POWDER, FOR SOLUTION INTRAVENOUS at 21:40

## 2022-04-24 NOTE — ASSESSMENT & PLAN NOTE
Holding home warfarin in setting of anemia requiring trasnfusion  At home on coumadin 5 mg on Wednesday and Saturday, 2 5 mg all other days  Continue mechanical VTE prophylaxis  Consider resuming Coumadin in the next 24 hours once GI bleeding ruled out  Hb 7 7 today-> continue to hold for now

## 2022-04-24 NOTE — ASSESSMENT & PLAN NOTE
· Continue metoprolol 100 mg and flecainide  · Held warfarin due to anemia requiring transfusion  · Resume warfarin 4/25

## 2022-04-24 NOTE — PROGRESS NOTES
[place]  Progress Note - Nolan Arriaza 1950, 71 y o  male MRN: [id number]  Unit/Bed#: -01 Encounter: [id number]  Primary Care Provider: Mikael Dean MD   Date and time admitted to hospital: 4/22/2022  2:41 PM    * JANEL (acute kidney injury) [facility])  Assessment & Plan  Patient presents volume overload, found to have acute anemia, hypoalbuminemia, proteinuria and JANEL with creatinine    Patient has been receiving vancomycin IV as outpatient for surgical site infection  Urinary retention protocol  dc'd IV Lasix   continue albumin  Nephrology following  Infectious Disease recommending daptomycin  Cr slightly improved today  Avoid NSAIDs, hypotension, nephrotoxic agents    Ambulatory dysfunction  Assessment & Plan  Patient presents with generalized weakness, difficulty with ADLs  Suspect secondary to volume overload/anasarca, deconditioning from recent hospitalization and anemia  Improved slightly with transfusion  PT/OT consult  Case management consult for likely placement assistance     Acute diastolic congestive heart failure (HCC)  Assessment & Plan  Wt Readings from Last 3 Encounters:   04/24/22 118 kg (260 lb 12 9 oz)   04/14/22 113 kg (249 lb 5 4 oz)   03/31/22 107 kg (235 lb)     Presents to ED with generalized weakness and leg swelling   No history of heart failure     CXR:No acute cardiopulmonary disease      ECHO 11/22/2021 revealed:  EF 98%, normal systolic function, normal wall motion, grade 2 diastolic relaxation, , mild wall thickness increase, mild right and left atrium, mild MR, mild TR, mild aortic stenosis, mild pulmonary artery systolic pressure increase  o Repeat ECHO ordered     Diuresis with 40 mg IV Lasix b i d  dc'd   Telemetry   Continue to monitor electrolytes and replete as needed   Daily weights   Strict I & Os   Cardiac diet, low sodium <2g, fluid restriction <1500   Consider cardiology consult post echo if changes   Ruled out    Surgical site infection  Assessment & Plan  Pt is 6 weeks status post cervical fusion complicated by MRSA infection   He is currently receiving home IV vancomycin Q 24 hours, with plan to continue for total of 28 days followed by another for 28 days of p o  Today is day #24/28 of IV vancomycin  Continue while inpatient  Pharmacy consult for management  Suspicion for vanco contributing to renal failure  Infectious Disease changed to dapto    Anemia  Assessment & Plan  New normocytic anemia  Previously around 13 prior to surgery in March  May be due to acute illness and renal failure  CBC showing: Hgb 6 6/Hct 20 9/MCV 95-normal  Received 1 unit PRBCs 4/22 for hemoglobin of 6 6  Transfuse if Hgb <7  B12 and folate low normal  Iron panel- iron saturation 20%, TIBC 204, iron 41  FOBT negative x1    H/O referral on discharge    Mixed hyperlipidemia  Assessment & Plan  Continue statin    Paroxysmal A-fib (HCC)  Assessment & Plan  Continue metoprolol 100 mg and flecainide  Holding warfarin due to anemia requiring transfusion  Hemoglobin decreased continue to hold warfarin    Depression, major, single episode, moderate (HCC)  Assessment & Plan  Continue Cymbalta    Factor V Leiden mutation [facility])  Assessment & Plan  Holding home warfarin in setting of anemia requiring trasnfusion  At home on coumadin 5 mg on Wednesday and Saturday, 2 5 mg all other days  Continue mechanical VTE prophylaxis  Consider resuming Coumadin in the next 24 hours once GI bleeding ruled out  Hb 7 7 today-> continue to hold for now    WILL (obstructive sleep apnea)  Assessment & Plan  CPAP at bedtime    Essential hypertension  Assessment & Plan  Currently on amlodipine 5 mg and metoprolol 100 mg daily - change hold parameters  C/w Albumin  IV Lasix 40 mg b i d  dc'd       VTE Pharmacologic Prophylaxis: VTE Score: 10 High Risk (Score >/= 5) - Pharmacological DVT Prophylaxis Contraindicated  Sequential Compression Devices Ordered  Patient Centered Rounds:  I performed bedside rounds with nursing staff today  Discussions with Specialists or Other Care Team Provider: ID, Nephrology    Education and Discussions with Family / Patient: pt  Time Spent for Care: 20 minutes  More than 50% of total time spent on counseling and coordination of care as described above  Current Length of Stay: 2 day(s)  Current Patient Status: Inpatient   Certification Statement: The patient will continue to require additional inpatient hospital stay due to JANEL  Discharge Plan: Anticipate discharge in 48-72 hrs to discharge location to be determined pending rehab evaluations  Code Status: Level 3 - DNAR and DNI    Subjective:   Ceira Clayton was seen and examined at bedside  No acute events overnight  Discussed plan of care  All questions and concerns were answered and addressed  Has no current concerns or complaints all symptoms on review of systems negative  Objective:     Vitals:   Temp (24hrs), Av °F (36 7 °C), Min:97 8 °F (36 6 °C), Max:98 2 °F (36 8 °C)    Temp:  [97 8 °F (36 6 °C)-98 2 °F (36 8 °C)] 98 1 °F (36 7 °C)  HR:  [54-62] 62  Resp:  [18-20] 18  BP: (141-154)/(49-88) 153/85  SpO2:  [87 %-94 %] 94 %  Body mass index is 36 37 kg/m²  Input and Output Summary (last 24 hours): Intake/Output Summary (Last 24 hours) at 2022 1651  Last data filed at 2022 1550  Gross per 24 hour   Intake --   Output 1350 ml   Net -1350 ml       Physical Exam:   Physical Exam   Constitutional:       Appearance: Normal appearance  He is obese  Neck:      Comments: Cervical spine collar in place  Cardiovascular:      Rate and Rhythm: Normal rate and regular rhythm  Pulmonary:      Effort: Pulmonary effort is normal       Breath sounds: Normal breath sounds  Abdominal:      General: Bowel sounds are normal       Palpations: Abdomen is soft  Musculoskeletal:         Right lower leg: Edema present  Left lower leg: Edema present        Comments: B/L UE edema  Neurological: General: No focal deficit present  Mental Status: He is alert  Additional Data:     Labs:  Results from last 7 days   Lab Units 04/24/22  0501   WBC Thousand/uL 4 52   HEMOGLOBIN g/dL 7 7*   HEMATOCRIT % 23 2*   PLATELETS Thousands/uL 157   NEUTROS PCT % 53   LYMPHS PCT % 25   MONOS PCT % 12   EOS PCT % 8*     Results from last 7 days   Lab Units 04/24/22  0501   SODIUM mmol/L 140   POTASSIUM mmol/L 3 6   CHLORIDE mmol/L 106   CO2 mmol/L 26   BUN mg/dL 27*   CREATININE mg/dL 2 77*   ANION GAP mmol/L 8   CALCIUM mg/dL 8 2*   ALBUMIN g/dL 1 8*   TOTAL BILIRUBIN mg/dL 0 50   ALK PHOS U/L 78   ALT U/L 10*   AST U/L 16   GLUCOSE RANDOM mg/dL 96     Results from last 7 days   Lab Units 04/22/22  1953   INR  1 95*                   Lines/Drains:  Invasive Devices  Report    Peripherally Inserted Central Catheter Line            PICC Line 86/73/68 Right Basilic 22 days                Central Line:  Goal for removal: N/A - Discharging with PICC for IV ABX/medications             Imaging: No pertinent imaging reviewed      Recent Cultures (last 7 days):         Last 24 Hours Medication List:   Current Facility-Administered Medications   Medication Dose Route Frequency Provider Last Rate    acetaminophen  650 mg Oral Q6H PRN Ren Severance, PA-C      albumin human  25 g Intravenous Daily Malorie Cisse DO      aluminum-magnesium hydroxide-simethicone  30 mL Oral Q6H PRN Dianelys Aviles PA-C      amLODIPine  5 mg Oral Daily García Conner PA-C      DAPTOmycin  8 mg/kg Intravenous Q48H Leonel Morse  mg (04/23/22 1746)    docusate sodium  100 mg Oral BID Dianelys Aviles PA-C      DULoxetine  60 mg Oral Daily Dianelys Aviles PA-C      flecainide  100 mg Oral BID Dianelys Aviles PA-C      gabapentin  100 mg Oral QAM Dianelys Aviles PA-C      gabapentin  300 mg Oral HS Dianelys Aviles PA-C      methocarbamol  750 mg Oral Q6H PRN Dianelys Aviles PA-C      metoprolol succinate  100 mg Oral Daily García Conner PA-C      ondansetron  4 mg Intravenous Q6H PRN Rojas Bearded, FABIOLA      oxyCODONE  5 mg Oral Q4H PRN Rojas Bearded, FABIOLA      pantoprazole  40 mg Intravenous Q12H [address] Adrian Gtz MD      polyethylene glycol  17 g Oral Daily PRN Rojas Bearded, FABIOLA      potassium chloride  40 mEq Oral Daily Rojas Bearded, FABIOLA      pravastatin  40 mg Oral Daily With Elna Bloch, PA-C      tamsulosin  0 4 mg Oral Daily With Elna Bloch, PA-C          Today, Patient Was Seen By: Adrian Gtz MD    **Please Note: This note may have been constructed using a voice recognition system  **

## 2022-04-24 NOTE — ASSESSMENT & PLAN NOTE
· Pt is 6 weeks status post cervical fusion complicated by MRSA infection   · Was to complete 28 days of vanco IV and then 28 days of oral   · Received day #24/28 of IV vancomycin, transitioned to Daptomycin 4/24 by ID   Appreciate ongoing recommendations  · Continue while inpatient  · Pharmacy consult for management

## 2022-04-24 NOTE — PROGRESS NOTES
NEPHROLOGY PROGRESS NOTE   Miryam Vazquez 71 y o  male MRN: [id number]  Unit/Bed#: -01 Encounter: [id number]  Reason for Consult:  Acute kidney injury    Assessment/Plan:  1  Acute kidney injury, etiology likely multifactorial, could be secondary to vancomycin toxicity, post infectious GN, component prerenal azotemia  2  Proteinuria although not steady state, ratio at 18, likely affected by hematuria  3  Recent MRSA wound infection  Was discharged on continuation of IV vancomycin  Had received 23/28 days  4  Microscopic hematuria, could represent post infectious GN, would recommend checking complements  5  Elevated vancomycin trough most recently 27 9  6  Grade 2 diastolic dysfunction with previous ejection fraction 55%, repeat echocardiogram is pending  7  Acute on chronic anemia, status post PRBC transfusion    PLAN:  · Unfortunately no significant improvement in renal function although given elevated trough likely still some component from his vancomycin  · Given proteinuria and hematuria, would check serologies  · Again suspecting likely component from vancomycin as well as possible post infectious GN  · Continue monitor renal function closely  · Again discussed with patient at length, if no significant improvement may need to consider renal biopsy    SUBJECTIVE:  Seen and examined  Patient clinically looks improved  Appetite is improving  Denies any chest pain shortness of breath  Less fatigue      Review of Systems    OBJECTIVE:  Current Weight: Weight - Scale: 118 kg (260 lb 12 9 oz)  Vitals:    04/24/22 0221 04/24/22 0600 04/24/22 0747 04/24/22 0748   BP: (!) 141/49  154/88 154/88   Pulse: (!) 54  61 59   Resp: 18  20    Temp: 98 2 °F (36 8 °C)  97 8 °F (36 6 °C) 97 8 °F (36 6 °C)   TempSrc:       SpO2: 93%  (!) 87% 93%   Weight:  118 kg (260 lb 12 9 oz)     Height:           Intake/Output Summary (Last 24 hours) at 4/24/2022 1353  Last data filed at 4/24/2022 0944  Gross per 24 hour   Intake -- Output 1350 ml   Net -1350 ml       Physical Exam  Constitutional:       Appearance: He is obese  He is not ill-appearing  HENT:      Head: Normocephalic and atraumatic  Eyes:      General: No scleral icterus  Cardiovascular:      Rate and Rhythm: Normal rate and regular rhythm  Pulmonary:      Effort: Pulmonary effort is normal       Breath sounds: Normal breath sounds  Abdominal:      Palpations: Abdomen is soft  Musculoskeletal:      Right lower leg: No edema  Left lower leg: No edema  Skin:     General: Skin is warm and dry  Neurological:      Mental Status: He is alert and oriented to person, place, and time           Medications:    Current Facility-Administered Medications:     acetaminophen (TYLENOL) tablet 650 mg, 650 mg, Oral, Q6H PRN, Ifrah Boast, PA-C, 650 mg at 04/23/22 2124    albumin human (FLEXBUMIN) 25 % injection 25 g, 25 g, Intravenous, Daily, Nonda Shashi Cisse, DO, 25 g at 04/24/22 0902    aluminum-magnesium hydroxide-simethicone (MYLANTA) oral suspension 30 mL, 30 mL, Oral, Q6H PRN, Rojas Bearmarshall, PA-C    amLODIPine (NORVASC) tablet 5 mg, 5 mg, Oral, Daily, García WallaceMatcarter, PA-C, 5 mg at 04/24/22 0902    DAPTOmycin (CUBICIN) 950 mg in sodium chloride 0 9 % 50 mL IVPB, 8 mg/kg, Intravenous, Q48H, Angelina Rothman MD, Last Rate: 100 mL/hr at 04/23/22 1746, 950 mg at 04/23/22 1746    docusate sodium (COLACE) capsule 100 mg, 100 mg, Oral, BID, Rojas Bearded, PA-C, 100 mg at 04/24/22 5331    DULoxetine (CYMBALTA) delayed release capsule 60 mg, 60 mg, Oral, Daily, Rojas Bearded, PA-C, 60 mg at 04/24/22 9655    flecainide (TAMBOCOR) tablet 100 mg, 100 mg, Oral, BID, Rojas Bearded, PA-C, 100 mg at 04/24/22 9203    gabapentin (NEURONTIN) capsule 100 mg, 100 mg, Oral, QAM, Rojas Bearded, PA-C, 100 mg at 04/24/22 0643    gabapentin (NEURONTIN) capsule 300 mg, 300 mg, Oral, HS, Crystal Stuart PA-C, 300 mg at 04/23/22 2123    methocarbamol (ROBAXIN) tablet 750 mg, 750 mg, Oral, Q6H PRN, Mellissa Buchanan PA-C, 750 mg at 04/23/22 2125    metoprolol succinate (TOPROL-XL) 24 hr tablet 100 mg, 100 mg, Oral, Daily, García Conner PA-C, 100 mg at 04/24/22 0902    ondansetron (ZOFRAN) injection 4 mg, 4 mg, Intravenous, Q6H PRN, Mellissa Buchanan PA-C, 4 mg at 04/23/22 1640    oxyCODONE (ROXICODONE) IR tablet 5 mg, 5 mg, Oral, Q4H PRN, Mellissa Buchanan PA-C    pantoprazole (PROTONIX) injection 40 mg, 40 mg, Intravenous, Q12H [address], Lulu Baxter MD, 40 mg at 04/24/22 5355    polyethylene glycol (MIRALAX) packet 17 g, 17 g, Oral, Daily PRN, Mellissa Buchanan PA-C    potassium chloride (K-DUR,KLOR-CON) CR tablet 40 mEq, 40 mEq, Oral, Daily, Mellissa Buchanan PA-C, 40 mEq at 04/24/22 3453    pravastatin (PRAVACHOL) tablet 40 mg, 40 mg, Oral, Daily With Anjelica Monsivais PA-C, 40 mg at 04/23/22 1640    tamsulosin (FLOMAX) capsule 0 4 mg, 0 4 mg, Oral, Daily With Anjelica Monsivais PA-C, 0 4 mg at 04/23/22 1640    Laboratory Results:  Results from last 7 days   Lab Units 04/24/22  0501 04/23/22  1404 04/23/22  0617 04/23/22  0225 04/22/22  1522 04/18/22  1500   WBC Thousand/uL 4 52  --  5 11  --  6 34 5 88   HEMOGLOBIN g/dL 7 7* 8 1* 8 2* 8 2* 6 6* 7 9*   HEMATOCRIT % 23 2* 25 9* 26 1* 25 9* 20 9* 25 5*   PLATELETS Thousands/uL 157  --  194  --  241 223   POTASSIUM mmol/L 3 6  --  3 9  --  4 2 4 7   CHLORIDE mmol/L 106  --  107  --  105 105   CO2 mmol/L 26  --  27  --  27 28   BUN mg/dL 27*  --  30*  --  30* 26*   CREATININE mg/dL 2 77*  --  2 79*  --  2 65* 1 88*   CALCIUM mg/dL 8 2*  --  8 2*  --  8 2* 7 9*   MAGNESIUM mg/dL  --   --  1 8  --   --   --

## 2022-04-24 NOTE — ASSESSMENT & PLAN NOTE
· Holding home warfarin in setting of anemia requiring trasnfusion  · At home on coumadin 5 mg on Wednesday and Saturday, 2 5 mg all other days  · Continue mechanical VTE prophylaxis  · Resume coumadin 4/25 as FOBT negative  · Hb 7 5 today

## 2022-04-24 NOTE — ASSESSMENT & PLAN NOTE
Currently on amlodipine 5 mg and metoprolol 100 mg daily - change hold parameters  C/w Albumin  IV Lasix 40 mg b i d  dc'd

## 2022-04-24 NOTE — ASSESSMENT & PLAN NOTE
Patient presents volume overload, found to have acute anemia, hypoalbuminemia, proteinuria and JANEL with creatinine    Patient has been receiving vancomycin IV as outpatient for surgical site infection  Urinary retention protocol  dc'd IV Lasix   continue albumin  Consult Nephrology  Infectious Disease recommending daptomycin  Cr slightly improved today  Avoid NSAIDs, hypotension, nephrotoxic agents

## 2022-04-24 NOTE — ASSESSMENT & PLAN NOTE
New normocytic anemia  Previously around 13 prior to surgery in March  May be due to acute illness and renal failure    CBC showing: Hgb 6 6/Hct 20 9/MCV 95-normal  Received 1 unit PRBCs 4/22 for hemoglobin of 6 6  Transfuse if Hgb <7  B12 and folate low normal  Iron panel- iron saturation 20%, TIBC 204, iron 41  FOBT negative x1    H/O referral on discharge

## 2022-04-24 NOTE — ASSESSMENT & PLAN NOTE
Continue metoprolol 100 mg and flecainide  Holding warfarin due to anemia requiring transfusion  Hemoglobin decreased continue to hold warfarin no

## 2022-04-24 NOTE — ASSESSMENT & PLAN NOTE
Pt is 6 weeks status post cervical fusion complicated by MRSA infection   He is currently receiving home IV vancomycin Q 24 hours, with plan to continue for total of 28 days followed by another for 28 days of p o  Today is day #24/28 of IV vancomycin    Continue while inpatient  Pharmacy consult for management  Suspicion for vanco contributing to renal failure  Infectious Disease changed to dapto

## 2022-04-24 NOTE — ASSESSMENT & PLAN NOTE
Wt Readings from Last 3 Encounters:   04/24/22 118 kg (260 lb 12 9 oz)   04/14/22 113 kg (249 lb 5 4 oz)   03/31/22 107 kg (235 lb)     Presents to ED with generalized weakness and leg swelling   No history of heart failure     CXR:No acute cardiopulmonary disease      ECHO 11/22/2021 revealed:  EF 21%, normal systolic function, normal wall motion, grade 2 diastolic relaxation, , mild wall thickness increase, mild right and left atrium, mild MR, mild TR, mild aortic stenosis, mild pulmonary artery systolic pressure increase  o Repeat ECHO ordered     Diuresis with 40 mg IV Lasix b i d  dc'd   Telemetry   Continue to monitor electrolytes and replete as needed   Daily weights   Strict I & Os   Cardiac diet, low sodium <2g, fluid restriction <1500   Consider cardiology consult post echo if changes   Ruled out

## 2022-04-24 NOTE — ASSESSMENT & PLAN NOTE
· Patient presents with generalized weakness, difficulty with ADLs  · Suspect secondary to volume overload/anasarca, deconditioning from recent hospitalization and anemia  · Improved slightly with transfusion  · PT/OT consult  · Case management consult for likely placement assistance

## 2022-04-24 NOTE — ASSESSMENT & PLAN NOTE
· Currently on amlodipine 5 mg and metoprolol 100 mg daily - changed hold parameters  · Continue Albumin  · IV Lasix 40 mg b i d  dc'd

## 2022-04-24 NOTE — PLAN OF CARE
Problem: Potential for Falls  Goal: Patient will remain free of falls  Description: INTERVENTIONS:  - Educate patient/family on patient safety including physical limitations  - Instruct patient to call for assistance with activity   - Consult OT/PT to assist with strengthening/mobility   - Keep Call bell within reach  - Keep bed low and locked with side rails adjusted as appropriate  - Keep care items and personal belongings within reach  - Initiate and maintain comfort rounds  - Make Fall Risk Sign visible to staff  - Offer Toileting every 2 Hours, in advance of need  - Initiate/Maintain bed alarm  - Obtain necessary fall risk management equipment: non slip socks  - Apply yellow socks and bracelet for high fall risk patients  - Consider moving patient to room near nurses station  Outcome: Progressing     Problem: METABOLIC, FLUID AND ELECTROLYTES - ADULT  Goal: Electrolytes maintained within normal limits  Description: INTERVENTIONS:  - Monitor labs and assess patient for signs and symptoms of electrolyte imbalances  - Administer electrolyte replacement as ordered  - Monitor response to electrolyte replacements, including repeat lab results as appropriate  - Instruct patient on fluid and nutrition as appropriate  Outcome: Progressing     Problem: SKIN/TISSUE INTEGRITY - ADULT  Goal: Incision(s), wounds(s) or drain site(s) healing without S/S of infection  Description: INTERVENTIONS  - Assess and document dressing, incision, wound bed, drain sites and surrounding tissue  - Provide patient and family education  - Perform skin care/dressing changes every day  Outcome: Progressing     Problem: INFECTION - ADULT  Goal: Absence or prevention of progression during hospitalization  Description: INTERVENTIONS:  - Assess and monitor for signs and symptoms of infection  - Monitor lab/diagnostic results  - Monitor all insertion sites, i e  indwelling lines, tubes, and drains  - Monitor endotracheal if appropriate and nasal secretions for changes in amount and color  - [place] appropriate cooling/warming therapies per order  - Administer medications as ordered  - Instruct and encourage patient and family to use good hand hygiene technique  - Identify and instruct in appropriate isolation precautions for identified infection/condition  Outcome: Progressing  Goal: Absence of fever/infection during neutropenic period  Description: INTERVENTIONS:  - Monitor WBC    Outcome: Progressing

## 2022-04-24 NOTE — ASSESSMENT & PLAN NOTE
· New normocytic anemia  Previously around 13 prior to surgery in March  May be due to acute illness and renal failure    · CBC showing: Hgb 6 6/Hct 20 9/MCV 95-normal  · Received 1 unit PRBCs 4/22 for hemoglobin of 6 6  · Transfuse if Hgb <7  · B12 and folate low normal  · Iron panel- iron saturation 20%, TIBC 204, iron 41  · FOBT negative  · H/O referral on discharge

## 2022-04-25 ENCOUNTER — APPOINTMENT (INPATIENT)
Dept: NON INVASIVE DIAGNOSTICS | Facility: HOSPITAL | Age: 72
DRG: 698 | End: 2022-04-25
Payer: COMMERCIAL

## 2022-04-25 ENCOUNTER — TELEPHONE (OUTPATIENT)
Dept: NEUROSURGERY | Facility: CLINIC | Age: 72
End: 2022-04-25

## 2022-04-25 ENCOUNTER — TELEPHONE (OUTPATIENT)
Dept: FAMILY MEDICINE CLINIC | Facility: CLINIC | Age: 72
End: 2022-04-25

## 2022-04-25 LAB
ALBUMIN SERPL BCP-MCNC: 1.9 G/DL (ref 3.5–5)
ALP SERPL-CCNC: 80 U/L (ref 46–116)
ALT SERPL W P-5'-P-CCNC: 15 U/L (ref 12–78)
ANION GAP SERPL CALCULATED.3IONS-SCNC: 6 MMOL/L (ref 4–13)
AORTIC ROOT: 3.4 CM
AORTIC VALVE MEAN VELOCITY: 18 M/S
APICAL FOUR CHAMBER EJECTION FRACTION: 67 %
ASCENDING AORTA: 3.7 CM (ref 2.25–3.37)
AST SERPL W P-5'-P-CCNC: 19 U/L (ref 5–45)
AV AREA BY CONTINUOUS VTI: 1.1 CM2
AV AREA PEAK VELOCITY: 1 CM2
AV LVOT MEAN GRADIENT: 1 MMHG
AV LVOT PEAK GRADIENT: 3 MMHG
AV MEAN GRADIENT: 15 MMHG
AV PEAK GRADIENT: 29 MMHG
AV VALVE AREA: 1.08 CM2
AV VELOCITY RATIO: 0.3
BASOPHILS # BLD AUTO: 0.09 THOUSANDS/ÂΜL (ref 0–0.1)
BASOPHILS NFR BLD AUTO: 2 % (ref 0–1)
BILIRUB SERPL-MCNC: 0.4 MG/DL (ref 0.2–1)
BUN SERPL-MCNC: 28 MG/DL (ref 5–25)
C4 SERPL-MCNC: 30 MG/DL (ref 10–40)
CALCIUM ALBUM COR SERPL-MCNC: 9.6 MG/DL (ref 8.3–10.1)
CALCIUM SERPL-MCNC: 7.9 MG/DL (ref 8.3–10.1)
CHLORIDE SERPL-SCNC: 107 MMOL/L (ref 100–108)
CO2 SERPL-SCNC: 28 MMOL/L (ref 21–32)
CREAT SERPL-MCNC: 3.16 MG/DL (ref 0.6–1.3)
DOP CALC AO PEAK VEL: 2.7 M/S
DOP CALC AO VTI: 63.55 CM
DOP CALC LVOT AREA: 3.14 CM2
DOP CALC LVOT DIAMETER: 2 CM
DOP CALC LVOT PEAK VEL VTI: 21.83 CM
DOP CALC LVOT PEAK VEL: 0.82 M/S
DOP CALC LVOT STROKE INDEX: 28.9 ML/M2
DOP CALC LVOT STROKE VOLUME: 68.55 CM3
E WAVE DECELERATION TIME: 173 MS
EOSINOPHIL # BLD AUTO: 0.38 THOUSAND/ÂΜL (ref 0–0.61)
EOSINOPHIL NFR BLD AUTO: 7 % (ref 0–6)
ERYTHROCYTE [DISTWIDTH] IN BLOOD BY AUTOMATED COUNT: 14.6 % (ref 11.6–15.1)
FRACTIONAL SHORTENING: 47 % (ref 28–44)
GFR SERPL CREATININE-BSD FRML MDRD: 18 ML/MIN/1.73SQ M
GLUCOSE SERPL-MCNC: 114 MG/DL (ref 65–140)
HBV CORE AB SER QL: NORMAL
HBV CORE IGM SER QL: NORMAL
HBV SURFACE AG SER QL: NORMAL
HCT VFR BLD AUTO: 22.7 % (ref 36.5–49.3)
HCV AB SER QL: NORMAL
HEMOCCULT STL QL: NEGATIVE
HEMOCCULT STL QL: NORMAL
HEMOCCULT STL QL: NORMAL
HGB BLD-MCNC: 7.5 G/DL (ref 12–17)
IMM GRANULOCYTES # BLD AUTO: 0.02 THOUSAND/UL (ref 0–0.2)
IMM GRANULOCYTES NFR BLD AUTO: 0 % (ref 0–2)
INR PPP: 1.68 (ref 0.84–1.19)
INTERVENTRICULAR SEPTUM IN DIASTOLE (PARASTERNAL SHORT AXIS VIEW): 1.3 CM
INTERVENTRICULAR SEPTUM: 1.3 CM (ref 0.58–1.08)
LAAS-AP2: 26.6 CM2
LAAS-AP4: 30.5 CM2
LEFT ATRIUM AREA SYSTOLE SINGLE PLANE A4C: 27.5 CM2
LEFT ATRIUM SIZE: 4.4 CM
LEFT INTERNAL DIMENSION IN SYSTOLE: 3 CM (ref 6.09–9.23)
LEFT VENTRICULAR INTERNAL DIMENSION IN DIASTOLE: 5.7 CM (ref 10.31–15.36)
LEFT VENTRICULAR POSTERIOR WALL IN END DIASTOLE: 1.3 CM (ref 0.56–1.07)
LEFT VENTRICULAR STROKE VOLUME: 123 ML
LVSV (TEICH): 123 ML
LYMPHOCYTES # BLD AUTO: 1.23 THOUSANDS/ÂΜL (ref 0.6–4.47)
LYMPHOCYTES NFR BLD AUTO: 21 % (ref 14–44)
MAGNESIUM SERPL-MCNC: 1.8 MG/DL (ref 1.6–2.6)
MCH RBC QN AUTO: 29.2 PG (ref 26.8–34.3)
MCHC RBC AUTO-ENTMCNC: 33 G/DL (ref 31.4–37.4)
MCV RBC AUTO: 88 FL (ref 82–98)
MONOCYTES # BLD AUTO: 0.66 THOUSAND/ÂΜL (ref 0.17–1.22)
MONOCYTES NFR BLD AUTO: 12 % (ref 4–12)
MV E'TISSUE VEL-SEP: 7 CM/S
MV PEAK A VEL: 0.54 M/S
MV PEAK E VEL: 102 CM/S
MV STENOSIS PRESSURE HALF TIME: 50 MS
MV VALVE AREA P 1/2 METHOD: 4.4 CM2
NEUTROPHILS # BLD AUTO: 3.36 THOUSANDS/ÂΜL (ref 1.85–7.62)
NEUTS SEG NFR BLD AUTO: 58 % (ref 43–75)
NRBC BLD AUTO-RTO: 0 /100 WBCS
PHOSPHATE SERPL-MCNC: 4.7 MG/DL (ref 2.3–4.1)
PLATELET # BLD AUTO: 166 THOUSANDS/UL (ref 149–390)
PMV BLD AUTO: 9.9 FL (ref 8.9–12.7)
POTASSIUM SERPL-SCNC: 3.6 MMOL/L (ref 3.5–5.3)
PROT SERPL-MCNC: 5.3 G/DL (ref 6.4–8.2)
PROTHROMBIN TIME: 19.5 SECONDS (ref 11.6–14.5)
RBC # BLD AUTO: 2.57 MILLION/UL (ref 3.88–5.62)
RIGHT ATRIUM AREA SYSTOLE A4C: 23.7 CM2
RIGHT VENTRICLE ID DIMENSION: 4.6 CM
SL CV LEFT ATRIUM LENGTH A2C: 5.7 CM
SL CV LV EF: 55
SL CV PED ECHO LEFT VENTRICLE DIASTOLIC VOLUME (MOD BIPLANE) 2D: 158 ML
SL CV PED ECHO LEFT VENTRICLE SYSTOLIC VOLUME (MOD BIPLANE) 2D: 35 ML
SODIUM SERPL-SCNC: 141 MMOL/L (ref 136–145)
TRICUSPID ANNULAR PLANE SYSTOLIC EXCURSION: 2.9 CM
VANCOMYCIN TROUGH SERPL-MCNC: 16.6 UG/ML (ref 10–20)
WBC # BLD AUTO: 5.74 THOUSAND/UL (ref 4.31–10.16)
Z-SCORE OF ASCENDING AORTA: 3.16
Z-SCORE OF INTERVENTRICULAR SEPTUM IN END DIASTOLE: 3.63
Z-SCORE OF LEFT VENTRICULAR DIMENSION IN END DIASTOLE: -7.92
Z-SCORE OF LEFT VENTRICULAR DIMENSION IN END SYSTOLE: -7.24
Z-SCORE OF LEFT VENTRICULAR POSTERIOR WALL IN END DIASTOLE: 3.74

## 2022-04-25 PROCEDURE — 87340 HEPATITIS B SURFACE AG IA: CPT | Performed by: INTERNAL MEDICINE

## 2022-04-25 PROCEDURE — 93306 TTE W/DOPPLER COMPLETE: CPT

## 2022-04-25 PROCEDURE — 97116 GAIT TRAINING THERAPY: CPT

## 2022-04-25 PROCEDURE — 84100 ASSAY OF PHOSPHORUS: CPT | Performed by: STUDENT IN AN ORGANIZED HEALTH CARE EDUCATION/TRAINING PROGRAM

## 2022-04-25 PROCEDURE — 93306 TTE W/DOPPLER COMPLETE: CPT | Performed by: INTERNAL MEDICINE

## 2022-04-25 PROCEDURE — 99232 SBSQ HOSP IP/OBS MODERATE 35: CPT | Performed by: PHYSICIAN ASSISTANT

## 2022-04-25 PROCEDURE — 86803 HEPATITIS C AB TEST: CPT | Performed by: INTERNAL MEDICINE

## 2022-04-25 PROCEDURE — 83735 ASSAY OF MAGNESIUM: CPT | Performed by: STUDENT IN AN ORGANIZED HEALTH CARE EDUCATION/TRAINING PROGRAM

## 2022-04-25 PROCEDURE — 86160 COMPLEMENT ANTIGEN: CPT | Performed by: INTERNAL MEDICINE

## 2022-04-25 PROCEDURE — 97163 PT EVAL HIGH COMPLEX 45 MIN: CPT

## 2022-04-25 PROCEDURE — 85610 PROTHROMBIN TIME: CPT | Performed by: STUDENT IN AN ORGANIZED HEALTH CARE EDUCATION/TRAINING PROGRAM

## 2022-04-25 PROCEDURE — 83520 IMMUNOASSAY QUANT NOS NONAB: CPT | Performed by: INTERNAL MEDICINE

## 2022-04-25 PROCEDURE — 86038 ANTINUCLEAR ANTIBODIES: CPT | Performed by: INTERNAL MEDICINE

## 2022-04-25 PROCEDURE — 80053 COMPREHEN METABOLIC PANEL: CPT | Performed by: STUDENT IN AN ORGANIZED HEALTH CARE EDUCATION/TRAINING PROGRAM

## 2022-04-25 PROCEDURE — 86705 HEP B CORE ANTIBODY IGM: CPT | Performed by: INTERNAL MEDICINE

## 2022-04-25 PROCEDURE — 80202 ASSAY OF VANCOMYCIN: CPT | Performed by: PHYSICIAN ASSISTANT

## 2022-04-25 PROCEDURE — 99233 SBSQ HOSP IP/OBS HIGH 50: CPT | Performed by: INTERNAL MEDICINE

## 2022-04-25 PROCEDURE — C9113 INJ PANTOPRAZOLE SODIUM, VIA: HCPCS | Performed by: STUDENT IN AN ORGANIZED HEALTH CARE EDUCATION/TRAINING PROGRAM

## 2022-04-25 PROCEDURE — 85025 COMPLETE CBC W/AUTO DIFF WBC: CPT | Performed by: STUDENT IN AN ORGANIZED HEALTH CARE EDUCATION/TRAINING PROGRAM

## 2022-04-25 PROCEDURE — 86037 ANCA TITER EACH ANTIBODY: CPT | Performed by: INTERNAL MEDICINE

## 2022-04-25 PROCEDURE — 86225 DNA ANTIBODY NATIVE: CPT | Performed by: INTERNAL MEDICINE

## 2022-04-25 PROCEDURE — 86704 HEP B CORE ANTIBODY TOTAL: CPT | Performed by: INTERNAL MEDICINE

## 2022-04-25 RX ORDER — FUROSEMIDE 10 MG/ML
80 INJECTION INTRAMUSCULAR; INTRAVENOUS ONCE
Status: COMPLETED | OUTPATIENT
Start: 2022-04-25 | End: 2022-04-25

## 2022-04-25 RX ORDER — WARFARIN SODIUM 2.5 MG/1
2.5 TABLET ORAL
Status: DISCONTINUED | OUTPATIENT
Start: 2022-04-25 | End: 2022-04-29 | Stop reason: HOSPADM

## 2022-04-25 RX ADMIN — PANTOPRAZOLE SODIUM 40 MG: 40 INJECTION, POWDER, FOR SOLUTION INTRAVENOUS at 09:21

## 2022-04-25 RX ADMIN — GABAPENTIN 100 MG: 100 CAPSULE ORAL at 09:20

## 2022-04-25 RX ADMIN — AMLODIPINE BESYLATE 5 MG: 5 TABLET ORAL at 09:19

## 2022-04-25 RX ADMIN — DAPTOMYCIN 950 MG: 500 INJECTION, POWDER, LYOPHILIZED, FOR SOLUTION INTRAVENOUS at 18:01

## 2022-04-25 RX ADMIN — FLECAINIDE ACETATE 100 MG: 50 TABLET ORAL at 17:30

## 2022-04-25 RX ADMIN — PRAVASTATIN SODIUM 40 MG: 40 TABLET ORAL at 17:31

## 2022-04-25 RX ADMIN — FUROSEMIDE 80 MG: 10 INJECTION, SOLUTION INTRAMUSCULAR; INTRAVENOUS at 17:32

## 2022-04-25 RX ADMIN — FLECAINIDE ACETATE 100 MG: 50 TABLET ORAL at 09:20

## 2022-04-25 RX ADMIN — WARFARIN SODIUM 2.5 MG: 2.5 TABLET ORAL at 17:32

## 2022-04-25 RX ADMIN — PANTOPRAZOLE SODIUM 40 MG: 40 INJECTION, POWDER, FOR SOLUTION INTRAVENOUS at 21:57

## 2022-04-25 RX ADMIN — GABAPENTIN 300 MG: 300 CAPSULE ORAL at 21:57

## 2022-04-25 RX ADMIN — ACETAMINOPHEN 650 MG: 325 TABLET ORAL at 21:56

## 2022-04-25 RX ADMIN — DOCUSATE SODIUM 100 MG: 100 CAPSULE, LIQUID FILLED ORAL at 09:19

## 2022-04-25 RX ADMIN — DOCUSATE SODIUM 100 MG: 100 CAPSULE, LIQUID FILLED ORAL at 17:30

## 2022-04-25 RX ADMIN — METOPROLOL SUCCINATE 100 MG: 50 TABLET, EXTENDED RELEASE ORAL at 09:21

## 2022-04-25 RX ADMIN — ALBUMIN (HUMAN) 25 G: 0.25 INJECTION, SOLUTION INTRAVENOUS at 09:19

## 2022-04-25 RX ADMIN — ALTEPLASE 2 MG: 2.2 INJECTION, POWDER, LYOPHILIZED, FOR SOLUTION INTRAVENOUS at 22:12

## 2022-04-25 RX ADMIN — DULOXETINE 60 MG: 30 CAPSULE, DELAYED RELEASE ORAL at 09:20

## 2022-04-25 RX ADMIN — METHOCARBAMOL TABLETS 750 MG: 500 TABLET, COATED ORAL at 21:56

## 2022-04-25 RX ADMIN — ACETAMINOPHEN 650 MG: 325 TABLET ORAL at 05:45

## 2022-04-25 RX ADMIN — POTASSIUM CHLORIDE 40 MEQ: 20 TABLET, EXTENDED RELEASE ORAL at 09:22

## 2022-04-25 RX ADMIN — TAMSULOSIN HYDROCHLORIDE 0.4 MG: 0.4 CAPSULE ORAL at 17:31

## 2022-04-25 NOTE — TELEPHONE ENCOUNTER
PM -    FYI message -    Pt's wife called in to state that pt  s next appmt must be canceld bc he was re-ad[facility] to the [facility] since 4/22/[facility]      Pt had "blood transfusions, and this admittance follows spinal surgery, infection, and his kidneys are currently failing "

## 2022-04-25 NOTE — PLAN OF CARE
Problem: METABOLIC, FLUID AND ELECTROLYTES - ADULT  Goal: Electrolytes maintained within normal limits  Description: INTERVENTIONS:  - Monitor labs and assess patient for signs and symptoms of electrolyte imbalances  - Administer electrolyte replacement as ordered  - Monitor response to electrolyte replacements, including repeat lab results as appropriate  - Instruct patient on fluid and nutrition as appropriate  Outcome: Progressing     Problem: INFECTION - ADULT  Goal: Absence or prevention of progression during hospitalization  Description: INTERVENTIONS:  - Assess and monitor for signs and symptoms of infection  - Monitor lab/diagnostic results  - Monitor all insertion sites, i e  indwelling lines, tubes, and drains  - Monitor endotracheal if appropriate and nasal secretions for changes in amount and color  - [place] appropriate cooling/warming therapies per order  - Administer medications as ordered  - Instruct and encourage patient and family to use good hand hygiene technique  - Identify and instruct in appropriate isolation precautions for identified infection/condition  Outcome: Progressing  Goal: Absence of fever/infection during neutropenic period  Description: INTERVENTIONS:  - Monitor WBC    Outcome: Progressing

## 2022-04-25 NOTE — UTILIZATION REVIEW
Initial Clinical Review    Admission: Date/Time/Statement:   Admission Orders (From admission, onward)     Ordered        04/22/22 1638  Inpatient Admission  Once                      Orders Placed This Encounter   Procedures    Inpatient Admission     Standing Status:   Standing     Number of Occurrences:   1     Order Specific Question:   Level of Care     Answer:   Med Surg [16]     Order Specific Question:   Estimated length of stay     Answer:   More than 2 Midnights     Order Specific Question:   Certification     Answer:   I certify that inpatient services are medically necessary for this patient for a duration of greater than two midnights  See H&P and MD Progress Notes for additional information about the patient's course of treatment  ED Arrival Information     Expected Arrival Acuity    - 4/22/2022 14:07 Urgent         Means of arrival Escorted by Service Admission type    Walk-In Family Member Hospitalist Urgent         Arrival complaint    Abnormal Laboratory        Chief Complaint   Patient presents with    Weakness - Generalized     To ED for blood work per patient  States that his infectious disease doctor wants blood work  No fever or chills, no diarrhea  Initial Presentation: 71 y o  male from home presented to the ED as directed by OP ID d/t generalized weakness, poor appetite and nausea  PMH of htn, chronic pain, afib, factor 5 Leiden, hyperlipidemia, anemia  Pt was recently discharged on 4/15 s/p cervical fusion complicated by MRSA infection and getting IV vancomycin at home  He also reports LE swelling that started yesterday and worse today  In the ED,  found to have bilateral lower and upper extremity edema, JANEL, hemoglobin 6 7 (baseline around 7)  BNP elevated to 3948  EKG sinus bradycardia - rate 57  Plan: Inpatient admission for evaluation and treatment of acute CHF, JANEL, anemia, ambulatory dysfunction: Diuresis with 40 mg IV Lasix b i d   Telemetry, Continue to monitor electrolytes and replete as needed, Daily weights, Strict I & Os  Cardiac diet, low sodium <2g, fluid restriction <1500  Repeat ECHO  trend CBC q8h  Cont IV Vanco  check UA  Check Albumin  Date: 04/23   Day 2:   IM Notes: Pt reports swelling has improved  Feeling less weak than yesterday  He has minimal improvement in anasarca with IV Lasix and albumin over the last 24 hours however creatinine worsening  Will discontinue IV Lasix and continue albumin  Consult Nephrology  Consult Infectious Disease for alternative antibiotic  On IV Vanco 23/28  Received 1 u prbc 04/22, hb 8 2 after  If hemoglobin remains stable and no sign of GI bleeding will resume Coumadin on Sunday  PE: Pt is alert with no focal deficit present  Flat affect, depressed mnood  Cervical spine collar in place  Periorbital swelling present  B/l pitting edema in UE R>L, LE edema present   Nephrology following      ED Triage Vitals [04/22/22 1427]   Temperature Pulse Respirations Blood Pressure SpO2   (!) 97 3 °F (36 3 °C) 60 20 (!) 197/86 98 %      Temp Source Heart Rate Source Patient Position - Orthostatic VS BP Location FiO2 (%)   Temporal Monitor Sitting Left arm --      Pain Score       No Pain          Wt Readings from Last 1 Encounters:   04/25/22 117 kg (257 lb)     Additional Vital Signs:   Date/Time Temp Pulse Resp BP MAP (mmHg) SpO2 O2 Device O2 Interface Device Patient Position - Orthostatic VS   04/23/22 15:34:44 97 9 °F (36 6 °C) 57 20 160/83 109 96 % -- -- --   04/23/22 1226 -- -- -- -- -- -- None (Room air) -- --   04/23/22 07:38:31 98 3 °F (36 8 °C) 58 -- 160/83 109 91 % -- -- --   04/23/22 06:11:51 -- 65 17 165/85 112 93 % -- -- --   04/23/22 0047 -- -- -- -- -- 96 % -- Face mask --   04/23/22 00:22:10 97 6 °F (36 4 °C) 62 18 150/66 94 93 % -- -- --   04/22/22 22:40:47 98 1 °F (36 7 °C) 62 18 168/89 115 93 % -- -- --   04/22/22 22:10:55 98 3 °F (36 8 °C) 59 18 173/92 Abnormal  119 93 % -- -- --   04/22/22 19:14:14 -- 58 -- 174/94 Abnormal  121 95 % -- -- --   04/22/22 19:07:03 -- 59 -- 189/98 Abnormal  128 96 % -- -- --   04/22/22 17:01:33 98 3 °F (36 8 °C) 58 18 217/104 Abnormal  142 97 % -- -- --       Pertinent Labs/Diagnostic Test Results:   XR chest 1 view portable   ED Interpretation by Yefri Block DO (04/22 1537)   ED Provider X-ray Interpretation    My X-ray interpretation of the Chest: was negative for infiltrate, effusion, pneumothorax, or wide mediastinum    Yefri Block DO      Final Result by Shon Bullard MD (04/22 2039)   Addendum 1 of 1 by Shon Bullard MD (04/22 2039)   ADDENDUM:      PICC line tip projects over the region of the mid SVC  Final      No acute cardiopulmonary disease  Workstation performed: [id number]           04/22 EKG result: Sinus bradycardia with 1st degree A-V block  Right bundle branch block      Results from last 7 days   Lab Units 04/25/22  0519 04/24/22  0501 04/23/22  1404 04/23/22  0617 04/23/22  0225 04/22/22  1522   WBC Thousand/uL 5 74 4 52  --  5 11  --    < >   HEMOGLOBIN g/dL 7 5* 7 7* 8 1* 8 2* 8 2*  --    HEMATOCRIT % 22 7* 23 2* 25 9* 26 1* 25 9*  --    PLATELETS Thousands/uL 166 157  --  194  --    < >   NEUTROS ABS Thousands/µL 3 36 2 40  --  2 78  --    < >    < > = values in this interval not displayed           Results from last 7 days   Lab Units 04/25/22  0519 04/24/22  0501 04/23/22  0617 04/22/22  1522 04/18/22  1500   SODIUM mmol/L 141 140 141 140 138   POTASSIUM mmol/L 3 6 3 6 3 9 4 2 4 7   CHLORIDE mmol/L 107 106 107 105 105   CO2 mmol/L 28 26 27 27 28   ANION GAP mmol/L 6 8 7 8 5   BUN mg/dL 28* 27* 30* 30* 26*   CREATININE mg/dL 3 16* 2 77* 2 79* 2 65* 1 88*   EGFR ml/min/1 73sq m 18 21 21 23 35   CALCIUM mg/dL 7 9* 8 2* 8 2* 8 2* 7 9*   MAGNESIUM mg/dL 1 8  --  1 8  --   --    PHOSPHORUS mg/dL 4 7*  --   --   --   --      Results from last 7 days   Lab Units 04/25/22  0519 04/24/22  0501 04/22/22  1522   AST U/L 19 16  --    ALT U/L 15 10*  --    ALK PHOS U/L 80 78  --    TOTAL PROTEIN g/dL 5 3* 5 3*  --    ALBUMIN g/dL 1 9* 1 8* 1 9*   TOTAL BILIRUBIN mg/dL 0 40 0 50  --          Results from last 7 days   Lab Units 04/25/22  0519 04/24/22  0501 04/23/22  0617 04/22/22  1522 04/18/22  1500   GLUCOSE RANDOM mg/dL 114 96 93 107 85       Results from last 7 days   Lab Units 04/24/22  0501   CK TOTAL U/L 42     Results from last 7 days   Lab Units 04/22/22 1953 04/22/22  1522   HS TNI 0HR ng/L  --  7   HS TNI 4HR ng/L 7  --    HSTNI D4 ng/L 0  --          Results from last 7 days   Lab Units 04/25/22  0519 04/22/22 1953 04/18/22  1500   PROTIME seconds 19 5* 21 8* 32 0*   INR  1 68* 1 95* 3 22*     Results from last 7 days   Lab Units 04/22/22  1953   TSH 3RD GENERATON uIU/mL 2 963                     Results from last 7 days   Lab Units 04/22/22  1522   NT-PRO BNP pg/mL 3,948*     Results from last 7 days   Lab Units 04/22/22  1522   FERRITIN ng/mL 521*     Results from last 7 days   Lab Units 04/25/22  0519   HEP B S AG  Non-reactive   HEP C AB  Non-reactive   HEP B C IGM  Non-reactive   HEP B C TOTAL AB  Non-reactive         Results from last 7 days   Lab Units 04/18/22  1500   CRP mg/L 21 8*             Results from last 7 days   Lab Units 04/23/22 2118 04/22/22  1915   CLARITY UA   --  Slightly Cloudy   COLOR UA   --  Pink   SPEC GRAV UA   --  1 020   PH UA   --  7 0   GLUCOSE UA mg/dl  --  Negative   KETONES UA mg/dl  --  Negative   BLOOD UA   --  Large*   PROTEIN UA mg/dl  --  >=300*   NITRITE UA   --  Negative   BILIRUBIN UA   --  Negative   UROBILINOGEN UA E U /dl  --  0 2   LEUKOCYTES UA   --  Negative   WBC UA /hpf  --  0-1   RBC UA /hpf  --  Innumerable*   BACTERIA UA /hpf  --  Occasional   EPITHELIAL CELLS WET PREP /hpf  --  None Seen   CREATININE UR mg/dL 45 0  --    PROTEIN UR mg/dL 849  --    PROT/CREAT RATIO UR  18 87*  --        Past Medical History:   Diagnosis Date    Acute venous embolism and thrombosis of deep vessels of proximal lower extremity (HCC)     Last assessed: 5/18/15    Arthritis     Cellulitis     LE    CPAP (continuous positive airway pressure) dependence     [facility] Leiden ([facility] )     Forgetfulness     [place] (hard of hearing)     Paroxysmal atrial fibrillation (HCC)     Last assessed: 11/2/15    Sleep apnea      Present on Admission:   Anemia   Essential hypertension   Factor V Leiden mutation ([facility] )   Paroxysmal A-fib ([facility] )   Surgical site infection   Depression, major, single episode, moderate (HCC)   WILL (obstructive sleep apnea)   Mixed hyperlipidemia      Admitting Diagnosis: Weakness [R53 1]  Anemia [D64 9]  Acute renal insufficiency [N28 9]  Pedal edema [R60 0]  Age/Sex: 71 y o  male  Admission Orders:  SCD  PT/OT  Daily weight  I/O  Measure post void residual    Scheduled Medications:  albumin human, 25 g, Intravenous, Daily  [START ON 4/24/2022] amLODIPine, 5 mg, Oral, Daily  DAPTOmycin, 8 mg/kg, Intravenous, Q48H  docusate sodium, 100 mg, Oral, BID  DULoxetine, 60 mg, Oral, Daily  flecainide, 100 mg, Oral, BID  gabapentin, 100 mg, Oral, QAM  gabapentin, 300 mg, Oral, HS  [START ON 4/24/2022] metoprolol succinate, 100 mg, Oral, Daily  pantoprazole, 40 mg, Intravenous, Q12H VARGAS  potassium chloride, 40 mEq, Oral, Daily  pravastatin, 40 mg, Oral, Daily With Dinner  tamsulosin, 0 4 mg, Oral, Daily With Dinner      Continuous IV Infusions: none     PRN Meds:  acetaminophen, 650 mg, Oral, Q6H PRN 04/23 x 2  aluminum-magnesium hydroxide-simethicone, 30 mL, Oral, Q6H PRN  methocarbamol, 750 mg, Oral, Q6H PRN  ondansetron, 4 mg, Intravenous, Q6H PRN 04/22 x 1, 04/23 x 1  oxyCODONE, 5 mg, Oral, Q4H PRN  polyethylene glycol, 17 g, Oral, Daily PRN        IP CONSULT TO CASE MANAGEMENT  IP CONSULT TO NEPHROLOGY  IP CONSULT TO INFECTIOUS DISEASES    Network Utilization Review Department  ATTENTION: Please call with any questions or concerns to [phone] and carefully listen to the prompts so that you are directed to the right person  All voicemails are confidential   Ludy Briceno all requests for admission clinical reviews, approved or denied determinations and any other requests to dedicated fax number below belonging to the campus where the patient is receiving treatment   List of dedicated fax numbers for the Facilities:  [address] DENIALS (Administrative/Medical Necessity) [phone]   1000  [facility] (Maternity/NICU/Pediatrics) [phone]   [address]  [address] [address] [facility] [place] [address] [address] [address] [place] [address] [facility] [facility] [phone]

## 2022-04-25 NOTE — PHYSICAL THERAPY NOTE
PHYSICAL THERAPY Evaluation    Performed at least 2 patient identifiers during session:  Patient Active Problem List   Diagnosis    Status post catheter ablation of atrial flutter    Essential hypertension    WILL (obstructive sleep apnea)    [facility] Le[facility]    Erectile dysfunction    Depression, major, single episode, moderate (HCC)    Insomnia    Lumbar herniated disc    Primary osteoarthritis of left knee    Primary osteoarthritis of right knee    Paroxysmal A-fib ([facility] )    Lower extremity edema    Mixed hyperlipidemia    History of DVT of lower extremity    IFG (impaired fasting glucose)    Pes anserinus bursitis of right knee    Weakness    Cervical myelopathy (HCC)    Sinus bradycardia    Goals of care, counseling/discussion    Muscle spasm    Hyponatremia    Head pain cephalgia    Anemia    Surgical site infection    Status post cervical spinal fusion    Great toe pain, right    Acute diastolic congestive heart failure (HCC)    Ambulatory dysfunction    JANEL (acute kidney injury) ([facility] )       Past Medical History:   Diagnosis Date    Acute venous embolism and thrombosis of deep vessels of proximal lower extremity (HCC)     Last assessed: 5/18/15    Arthritis     Cellulitis     LE    CPAP (continuous positive airway pressure) dependence     Factor V Leiden ([facility]     Forgetfulness     [place] (hard of hearing)     Paroxysmal atrial fibrillation ([facility] )     Last assessed: 11/2/15    Sleep apnea        Past Surgical History:   Procedure Laterality Date    BACK SURGERY      Lower    COLON SURGERY      COLONOSCOPY      INCISION AND DRAINAGE POSTERIOR SPINE N/A 4/1/2022    Procedure: Posterior cervical evacuation of postoperative collection and debridement with placement of drains C3-T1;   Surgeon: Julia Addison MD;  [facility];  Service: Neurosurgery    GA ARTHRODESIS ANT INTERBODY MIN DISCECTOMY, CERVICAL BELOW C2 N/A 3/11/2022    Procedure: Anterior cervical discectomy and fixation fusion C5/6 and C6/7; Posterior cervical decompression and instrumented fusion C3-T1; Surgeon: Uzair Grigsby MD;  [facility];  Service: Neurosurgery        04/25/22 1152   PT Last Visit   PT Visit Date 04/25/22   Note Type   Note type Evaluation   Pain Assessment   Pain Assessment Tool 0-10   Pain Score No Pain   Restrictions/Precautions   Braces or Orthoses C/S Collar   Other Precautions Contact/isolation  (MRSA)   Home Living   Type of [address] [place] [address] Two level;Bed/bath upstairs  (4 MICHELLE)   Home Equipment Walker   Prior Function   Level of [place] Independent with ADLs and functional mobility   Lives With Spouse   Receives Help From Family   ADL Assistance Independent   IADLs Needs assistance  (wife drives, cooks, cleans since cervical spine surgery)   Comments Pt states he was using a walker since his cervical surgery;  has 2 walkers (one upstairs and one down stairs)    General   Family/Caregiver Present No   Cognition   Overall Cognitive Status WFL   Arousal/Participation Alert   Orientation Level Oriented X4   Memory Within functional limits   Following Commands Follows one step commands with increased time or repetition   Subjective   Subjective Pt states he is doing ok;  pt states he feels depressed as he is unable to assist wife (who has pulmonary fibrosis) and he said she does not want to die alone; pt wants to be with wife to help her  Pt expresses that he is not interested in things that used to bring him tracie like watching sports on TV  Pt agreeable for PT to inform attending MD   TT to Dr Mihai Fink with information from PT session      RUE Assessment   RUE Assessment WFL   LUE Assessment   LUE Assessment WFL   RLE Assessment   RLE Assessment WFL  (RLE edema > LLE edema)   LLE Assessment   LLE Assessment WFL  (RLE edema > LLE edema)   Bed Mobility   Supine to Sit 5  Supervision   Additional items Assist x 1; Increased time required  (log roll)   Additional Comments Pt remains OOB in chair at end of session   Transfers   Sit to Stand 5  Supervision   Additional items Assist x 1; Increased time required  (RW)   Stand to Sit 5  Supervision   Additional items Assist x 1; Increased time required  (RW)   Ambulation/Elevation   Gait pattern Forward Flexion;Decreased foot clearance   Gait Assistance 5  Supervision   Additional items Assist x 1   Assistive Device Rolling walker   Distance 50ft with RW, no LOB, slow brooks   Stair Management Assistance Not tested  (4 MICHELLE and full flight to second floor;  B handrails)   Balance   Static Sitting Fair   Dynamic Sitting Fair   Static Standing Fair   Dynamic Standing Fair   Ambulatory Fair   Activity Tolerance   Activity Tolerance   (no adverse effects to PT noted)   Medical Staff Mary Crow Render   Assessment   Prognosis Good   Problem List Decreased endurance; Impaired balance;Decreased mobility;Obesity; Decreased skin integrity   Assessment Pt is a 71 y o  male who presented to ED 4/22/22 sent by OP MD for blood work; Pt is 6wks post C spine fusion complicated by MRSA  Dx:  Acute renal insufficiency, anemia, pedal edema, weakness, CHF, JANEL  Comorbidities affecting pt's physical performance at time of assessment include: obesity, a-fib, factor V Leiden, depression  Personal factors affecting pt at time of IE include: CP for MRSA, cervical spine brace, depression (Dr Janak Richards aware), steps to enter home  PLOF and home set up listed above; Upon evaluation: Pt requires S for bed mobility, S for sit to stand, and S for short distance ambulation around room with RW  Full objective findings from PT assessment regarding body systems outlined above  Current limitations include impaired balance, decreased endurance/activity tolerance, gait deviations, pain in R great toe chronic from last admission   Pt's clinical presentation is currently unstable/unpredictable seen in pt's presentation of continuous monitoring in hospital, infection, pain  Pt would benefit from continued PT while in hospital and follow up with HHCPT at D/C to increase strength, balance, endurance, independence with funcitonal mobility to return to PLOF, maximize independence, decrease caregiver burden and improve quality of life  The patient's AM-PAC Basic Mobility Inpatient Short Form Raw Score is 23  A Raw score of greater than 17 suggests the patient may benefit from discharge to home with use of RW for all mobility and HHCPT  Please also refer to the recommendation of the Physical Therapist for safe discharge planning  Goals   Patient Goals to go home and help wife   STG Expiration Date 05/09/22   Short Term Goal #1 Pt will be able to perform:  mod I sit to/from supine, mod I sit to/from standing with or without AD;  mod I to ambulate 150ft with or without AD;  mod I to ascend/descend 4 steps with handrails   Plan   Treatment/Interventions ADL retraining;Functional transfer training;LE strengthening/ROM; Elevations; Therapeutic exercise; Endurance training;Patient/family training;Equipment eval/education; Bed mobility;Gait training; Compensatory technique education;Continued evaluation;Spoke to nursing   PT Frequency 3-5x/wk   Recommendation   PT Discharge Recommendation Home with home health rehabilitation   [address] [place] [address] Mobility Inpatient   Turning in Bed Without Bedrails 4   Lying on Back to Sitting on Edge of Flat Bed 4   Moving Bed to Chair 4   Standing Up From Chair 4   Walk in Room 4   Climb 3-5 Stairs 3   Basic Mobility Inpatient Raw Score 23   Basic Mobility Standardized Score 50 88   Highest Level Of Mobility   JH-HLM Goal 7: Walk 25 feet or more   JH-HLM Highest Level of Mobility 7: Walk 25 feet or more   JH-HLM Goal Achieved Yes     Nito Vallecillo PT      Patient Name: Frandy Hickey  [id number] Date: 4/25/2022

## 2022-04-25 NOTE — PLAN OF CARE
Problem: Potential for Falls  Goal: Patient will remain free of falls  Description: INTERVENTIONS:  - Educate patient/family on patient safety including physical limitations  - Instruct patient to call for assistance with activity   - Consult OT/PT to assist with strengthening/mobility   - Keep Call bell within reach  - Keep bed low and locked with side rails adjusted as appropriate  - Keep care items and personal belongings within reach  - Initiate and maintain comfort rounds  - Make Fall Risk Sign visible to staff  - Offer Toileting every 2 Hours, in advance of need  - Initiate/Maintain bed alarm  - Obtain necessary fall risk management equipment: non slip socks  - Apply yellow socks and bracelet for high fall risk patients  - Consider moving patient to room near nurses station  Outcome: Progressing     Problem: METABOLIC, FLUID AND ELECTROLYTES - ADULT  Goal: Electrolytes maintained within normal limits  Description: INTERVENTIONS:  - Monitor labs and assess patient for signs and symptoms of electrolyte imbalances  - Administer electrolyte replacement as ordered  - Monitor response to electrolyte replacements, including repeat lab results as appropriate  - Instruct patient on fluid and nutrition as appropriate  Outcome: Progressing     Problem: SKIN/TISSUE INTEGRITY - ADULT  Goal: Incision(s), wounds(s) or drain site(s) healing without S/S of infection  Description: INTERVENTIONS  - Assess and document dressing, incision, wound bed, drain sites and surrounding tissue  - Provide patient and family education  - Perform skin care/dressing changes every day  Outcome: Progressing     Problem: INFECTION - ADULT  Goal: Absence or prevention of progression during hospitalization  Description: INTERVENTIONS:  - Assess and monitor for signs and symptoms of infection  - Monitor lab/diagnostic results  - Monitor all insertion sites, i e  indwelling lines, tubes, and drains  - Monitor endotracheal if appropriate and nasal secretions for changes in amount and color  - [place] appropriate cooling/warming therapies per order  - Administer medications as ordered  - Instruct and encourage patient and family to use good hand hygiene technique  - Identify and instruct in appropriate isolation precautions for identified infection/condition  Outcome: Progressing  Goal: Absence of fever/infection during neutropenic period  Description: INTERVENTIONS:  - Monitor WBC    Outcome: Progressing

## 2022-04-25 NOTE — PROGRESS NOTES
Progress Note - Nephrology   Billn Honorio 71 y o  male MRN: [id number]  Unit/Bed#: -01 Encounter: [id number]    ASSESSMENT and PLAN:  Acute kidney injury (POA):  Etiology: Suspect multifactorial   Suspect secondary to vanco mycin toxicity, post infectious GN along with component of prerenal azotemia  Assessment:   After review of medical records through [address] [place] [address] it appears that the patient has a baseline Creatinine of 0 6-0 9   Patient was admitted with a creatinine of 2 65 (prior creatinine 1 10 on 04/14/2022 and 1 88 on 04/18/2022 as out patient)   Patient's creatinine today is at 3 16 >2 77   Suspect slight increase secondary to intravascular volume depletion   Albumin 25% 25 g already ordered x3 doses   Acid base and lytes stable   Workup:   U PCR-18 87   C3-108 normal limits   Serologies-pending  Plan:   Avoid nephrotoxins, adjust meds to appropriate GFR   Optimize hemodynamic status to avoid delay in renal recovery   Agree with albumin x3 doses   May need to consider gentle IV fluids times 10 hours-urinary color-dark robert   Patient examining fairly euvolemic-avoid further diuretics   Discussed with patient and wife over phone if renal function continues to worsen renal biopsy would be beneficial for definitive diagnosis-both are aware   Serologies pending   Vancomycin remains discontinued and currently on daptomycin    Nephrotic range proteinuria  Assessment and Plan:   As above U PCR 18 87   Currently not in steady state   However will need to continue to monitor   As above may require renal biopsy if no improvement    Recent MRSA wound infection/surgical site infection/cervical fusion  Assessment and Plan:   Recent discharge with IV vancomycin-received 23/28 days   Last vanco trough 24 7 on 04/23/2022   Per primary team   Consider repeating to ensure improvement    Microscopic hematuria  Assessment and Plan:   Likely secondary to post infectious GN   Complements-pending  o C3-108  o C4-30     Acute on chronic anemia  Assessment and Plan:  · Current hemoglobin 7 5  · Status post transfusion for hemoglobin of 6 6 on 04/22/2022  · Patient has a history of PE with factor five Leiden mutation  · On Coumadin  · Not a candidate for EARLE  · Recommend FOBT possible GI consult    Acute diastolic congestive heart failure:  Assessment and plan  · Cardiology following  · Chest x-ray did not reveal any acute cardiopulmonary disease  · Echocardiogram on 11/22/2021 revealed EF of 26%, grade 2 diastolic dysfunction  · Repeat echo completed today-results pending  · Initially treated with IV Lasix-currently off  · Patient examining fairly euvolemic      SUBJECTIVE:  Patient seen and examined at bedside  Denies chest pain shortness of breath  Feels well overall  Patient frustrated with current medical condition, patient was tearful with discussing his infection with additional issues regarding his kidneys  Patient reports wife has IPF and he should be a one taking care of her  Emotional support given    Discussed with case management for patient may need social care    OBJECTIVE:  Current Weight: Weight - Scale: 117 kg (257 lb)  Vitals:    04/24/22 2151 04/25/22 0540 04/25/22 0735 04/25/22 0815   BP: 156/84  141/63 156/84   Pulse: 63  60 63   Resp: 16      Temp: 97 5 °F (36 4 °C)  98 4 °F (36 9 °C)    TempSrc:       SpO2: 96%  93%    Weight:  117 kg (257 lb 0 9 oz)  117 kg (257 lb)   Height:    5' 11" (1 803 m)       Intake/Output Summary (Last 24 hours) at 4/25/2022 1202  Last data filed at 4/25/2022 1021  Gross per 24 hour   Intake 360 ml   Output 1050 ml   Net -690 ml     General:  No acute distress, cooperative, lying flat  Skin:  Warm and dry without rash  ENMT:  Mucous membranes moist, sclera anicteric, cervical neck collar in place  Respiratory:  Essentially clear on auscultation without crackles, rhonchi, wheezes  Cardiac:  Regular rate and rhythm without rub, no lower extremity edema noted  GI:  Soft, nontender, no distention, will be active bowel sounds  Neuro:  Alert oriented and awake  Psych:  Appropriate affect    Medications:    Current Facility-Administered Medications:     acetaminophen (TYLENOL) tablet 650 mg, 650 mg, Oral, Q6H PRN, Laurel Menezes PA-C, 650 mg at 04/25/22 0545    aluminum-magnesium hydroxide-simethicone (MYLANTA) oral suspension 30 mL, 30 mL, Oral, Q6H PRN, Charli Wick PA-C    amLODIPine (NORVASC) tablet 5 mg, 5 mg, Oral, Daily, García Conner PA-C, 5 mg at 04/25/22 0919    DAPTOmycin (CUBICIN) 950 mg in sodium chloride 0 9 % 50 mL IVPB, 8 mg/kg, Intravenous, Q48H, Janna Roth MD, Last Rate: 100 mL/hr at 04/23/22 1746, 950 mg at 04/23/22 1746    docusate sodium (COLACE) capsule 100 mg, 100 mg, Oral, BID, Charli Wick PA-C, 100 mg at 04/25/22 0919    DULoxetine (CYMBALTA) delayed release capsule 60 mg, 60 mg, Oral, Daily, VEENA Smith-C, 60 mg at 04/25/22 0920    flecainide (TAMBOCOR) tablet 100 mg, 100 mg, Oral, BID, Charli Wick PA-C, 100 mg at 04/25/22 0920    gabapentin (NEURONTIN) capsule 100 mg, 100 mg, Oral, QAM, Charli Wick PA-C, 100 mg at 04/25/22 0920    gabapentin (NEURONTIN) capsule 300 mg, 300 mg, Oral, HS, Charli Wick PA-C, 300 mg at 04/24/22 2140    methocarbamol (ROBAXIN) tablet 750 mg, 750 mg, Oral, Q6H PRN, Charli Wick PA-C, 750 mg at 04/24/22 2140    metoprolol succinate (TOPROL-XL) 24 hr tablet 100 mg, 100 mg, Oral, Daily, TR GiraldoC, 100 mg at 04/25/22 0921    ondansetron (ZOFRAN) injection 4 mg, 4 mg, Intravenous, Q6H PRN, Charli Wick PA-C, 4 mg at 04/23/22 1640    oxyCODONE (ROXICODONE) IR tablet 5 mg, 5 mg, Oral, Q4H PRN, Charli Wick PA-C    pantoprazole (PROTONIX) injection 40 mg, 40 mg, Intravenous, Q12H [address], Rachel Flores MD, 40 mg at 04/25/22 5194    polyethylene glycol (MIRALAX) packet 17 g, 17 g, Oral, Daily PRN, Charli Wick PA-C    potassium chloride (K-DUR,KLOR-CON) CR tablet 40 mEq, 40 mEq, Oral, Daily, Angela Montalvo PA-C, 40 mEq at 04/25/22 3261    pravastatin (PRAVACHOL) tablet 40 mg, 40 mg, Oral, Daily With Delaney Enamorado PA-C, 40 mg at 04/24/22 1554    tamsulosin (FLOMAX) capsule 0 4 mg, 0 4 mg, Oral, Daily With Dinner, Angela Montalvo PA-C, 0 4 mg at 04/24/22 1554    warfarin (COUMADIN) tablet 2 5 mg, 2 5 mg, Oral, Daily (warfarin), Dontae Ramirez, [place]    Laboratory Results:  Results from last 7 days   Lab Units 04/25/22  0519 04/24/22  0501 04/23/22  1404 04/23/22  0617 04/23/22  0225 04/22/22  1522 04/18/22  1500   WBC Thousand/uL 5 74 4 52  --  5 11  --  6 34 5 88   HEMOGLOBIN g/dL 7 5* 7 7* 8 1* 8 2* 8 2* 6 6* 7 9*   HEMATOCRIT % 22 7* 23 2* 25 9* 26 1* 25 9* 20 9* 25 5*   PLATELETS Thousands/uL 166 157  --  194  --  241 223   SODIUM mmol/L 141 140  --  141  --  140 138   POTASSIUM mmol/L 3 6 3 6  --  3 9  --  4 2 4 7   CHLORIDE mmol/L 107 106  --  107  --  105 105   CO2 mmol/L 28 26  --  27  --  27 28   BUN mg/dL 28* 27*  --  30*  --  30* 26*   CREATININE mg/dL 3 16* 2 77*  --  2 79*  --  2 65* 1 88*   CALCIUM mg/dL 7 9* 8 2*  --  8 2*  --  8 2* 7 9*   MAGNESIUM mg/dL 1 8  --   --  1 8  --   --   --    PHOSPHORUS mg/dL 4 7*  --   --   --   --   --   --

## 2022-04-25 NOTE — TELEPHONE ENCOUNTER
Received a call from Lor Perez with some questions regarding Tian's post op instructions etc      He is currently admitted at [facility] for kidney issues and anemia  He is scheduled to come in for 2 week post op for staple removal 4/29/2022  She does not think he will be able to make it  He has expressed some concerns over the care of his incision since nobody has come to check on his surgical site  Lor Perez questions the care of the incision and if there is any special consideration  Advised her that it should be washed daily with mild soap and water and kept dry  Collar pads should be changed QOD  She was appreciative of these instructions and will really to him  Also assisted to rescheduled his OV from Friday to Monday as she does not think he will be discharged by Friday

## 2022-04-25 NOTE — PLAN OF CARE
Problem: PHYSICAL THERAPY ADULT  Goal: Performs mobility at highest level of function for planned discharge setting  See evaluation for individualized goals  Description: Treatment/Interventions: ADL retraining,Functional transfer training,LE strengthening/ROM,Elevations,Therapeutic exercise,Endurance training,Patient/family training,Equipment eval/education,Bed mobility,Gait training,Compensatory technique education,Continued evaluation,Spoke to nursing          See flowsheet documentation for full assessment, interventions and recommendations  Note: Prognosis: Good  Problem List: Decreased endurance,Impaired balance,Decreased mobility,Obesity,Decreased skin integrity  Assessment: Pt is a 71 y o  male who presented to ED 4/22/22 sent by OP MD for blood work; Pt is 6wks post C spine fusion complicated by MRSA  Dx:  Acute renal insufficiency, anemia, pedal edema, weakness, CHF, JANEL  Comorbidities affecting pt's physical performance at time of assessment include: obesity, a-fib, factor V Leiden, depression  Personal factors affecting pt at time of IE include: CP for MRSA, cervical spine brace, depression (Dr Rohit Mccullough aware), steps to enter home  PLOF and home set up listed above; Upon evaluation: Pt requires S for bed mobility, S for sit to stand, and S for short distance ambulation around room with RW  Full objective findings from PT assessment regarding body systems outlined above  Current limitations include impaired balance, decreased endurance/activity tolerance, gait deviations, pain in R great toe chronic from last admission  Pt's clinical presentation is currently unstable/unpredictable seen in pt's presentation of continuous monitoring in hospital, infection, pain     Pt would benefit from continued PT while in hospital and follow up with HHCPT at D/C to increase strength, balance, endurance, independence with funcitonal mobility to return to PLOF, maximize independence, decrease caregiver burden and improve quality of life  The patient's AM-PAC Basic Mobility Inpatient Short Form Raw Score is 23  A Raw score of greater than 17 suggests the patient may benefit from discharge to home with use of RW for all mobility and HHCPT  Please also refer to the recommendation of the Physical Therapist for safe discharge planning  PT Discharge Recommendation: Home with home health rehabilitation          See flowsheet documentation for full assessment

## 2022-04-25 NOTE — PROGRESS NOTES
[place]  Progress Note - Juan Manuel Reyes 1950, 71 y o  male MRN: [id number]  Unit/Bed#: -01 Encounter: [id number]  Primary Care Provider: Loren Parks MD   Date and time admitted to hospital: 4/22/2022  2:41 PM    * JANEL (acute kidney injury) [facility])  Assessment & Plan  · Patient presents volume overload, found to have acute anemia, hypoalbuminemia, proteinuria and JANEL with creatinine    · Patient has been receiving vancomycin IV as outpatient for surgical site infection  · Urinary retention protocol  · IV Lasix discontinued  · Echo performed 4/25, await results  · Continue albumin  · Nephrology following  · Infectious Disease recommending daptomycin  · Avoid NSAIDs, hypotension, nephrotoxic agents  · Await serology studies  · Ultimately will need renal biopsy    Ambulatory dysfunction  Assessment & Plan  · Patient presents with generalized weakness, difficulty with ADLs  · Suspect secondary to volume overload/anasarca, deconditioning from recent hospitalization and anemia  · Improved slightly with transfusion  · PT/OT consult  · Case management consult for likely placement assistance     Surgical site infection  Assessment & Plan  · Pt is 6 weeks status post cervical fusion complicated by MRSA infection   · Was to complete 28 days of vanco IV and then 28 days of oral   · Received day #24/28 of IV vancomycin, transitioned to Daptomycin 4/24 by ID  Appreciate ongoing recommendations  · Continue while inpatient  · Pharmacy consult for management    Anemia  Assessment & Plan  · New normocytic anemia  Previously around 13 prior to surgery in March  May be due to acute illness and renal failure    · CBC showing: Hgb 6 6/Hct 20 9/MCV 95-normal  · Received 1 unit PRBCs 4/22 for hemoglobin of 6 6  · Transfuse if Hgb <7  · B12 and folate low normal  · Iron panel- iron saturation 20%, TIBC 204, iron 41  · FOBT negative  · H/O referral on discharge    Mixed hyperlipidemia  Assessment & Plan  · Continue statin    Paroxysmal A-fib (HCC)  Assessment & Plan  · Continue metoprolol 100 mg and flecainide  · Held warfarin due to anemia requiring transfusion  · Resume warfarin 4/25    Depression, major, single episode, moderate (HCC)  Assessment & Plan  · Continue Cymbalta    Factor V Leiden mutation [facility])  Assessment & Plan  · Holding home warfarin in setting of anemia requiring trasnfusion  · At home on coumadin 5 mg on Wednesday and Saturday, 2 5 mg all other days  · Continue mechanical VTE prophylaxis  · Resume coumadin 4/25 as FOBT negative  · Hb 7 5 today    WILL (obstructive sleep apnea)  Assessment & Plan  · CPAP at bedtime    Essential hypertension  Assessment & Plan  · Currently on amlodipine 5 mg and metoprolol 100 mg daily - changed hold parameters  · Continue Albumin  · IV Lasix 40 mg b i d  dc'd       VTE Pharmacologic Prophylaxis: VTE Score: 10 High Risk (Score >/= 5) - Pharmacological DVT Prophylaxis Ordered: warfarin (Coumadin)  Sequential Compression Devices Ordered  Patient Centered Rounds: I performed bedside rounds with nursing staff today  Discussions with Specialists or Other Care Team Provider: Discussed with Nephrology, will ultimately need renal biopsy  Education and Discussions with Family / Patient: Updated  (wife) at bedside  over speakerphone with patient    Time Spent for Care: 60 minutes  More than 50% of total time spent on counseling and coordination of care as described above  Current Length of Stay: 3 day(s)  Current Patient Status: Inpatient   Certification Statement: The patient will continue to require additional inpatient hospital stay due to JANEL, pendiong biopsy  Discharge Plan: Anticipate discharge in >72 hrs to discharge location to be determined pending rehab evaluations  Code Status: Level 3 - DNAR and DNI    Subjective:   Patient concerned about his outpatient follow up appointment with neurosurgery   He wants to get up and out of bed if possible  He feels as if he is holding on to fluid and questions how his kidneys are functioning  Objective:     Vitals:   Temp (24hrs), Av °F (36 7 °C), Min:97 5 °F (36 4 °C), Max:98 4 °F (36 9 °C)    Temp:  [97 5 °F (36 4 °C)-98 4 °F (36 9 °C)] 98 4 °F (36 9 °C)  HR:  [60-63] 63  Resp:  [16-18] 16  BP: (141-156)/(63-85) 156/84  SpO2:  [93 %-96 %] 93 %  Body mass index is 35 84 kg/m²  Input and Output Summary (last 24 hours): Intake/Output Summary (Last 24 hours) at 2022 1251  Last data filed at 2022 1021  Gross per 24 hour   Intake 360 ml   Output 1050 ml   Net -690 ml       Physical Exam:   Physical Exam  Vitals and nursing note reviewed  Constitutional:       General: He is not in acute distress  Appearance: Normal appearance  He is well-developed  HENT:      Head: Normocephalic and atraumatic  Eyes:      General: No scleral icterus  Conjunctiva/sclera: Conjunctivae normal    Neck:      Comments: Neck brace in place  Cardiovascular:      Rate and Rhythm: Normal rate and regular rhythm  Heart sounds: No murmur heard  Pulmonary:      Effort: Pulmonary effort is normal       Breath sounds: No wheezing, rhonchi or rales  Abdominal:      General: There is no distension  Palpations: Abdomen is soft  Skin:     General: Skin is warm and dry  Neurological:      General: No focal deficit present  Mental Status: He is alert     Psychiatric:         Mood and Affect: Mood normal          Additional Data:     Labs:  Results from last 7 days   Lab Units 22  0519   WBC Thousand/uL 5 74   HEMOGLOBIN g/dL 7 5*   HEMATOCRIT % 22 7*   PLATELETS Thousands/uL 166   NEUTROS PCT % 58   LYMPHS PCT % 21   MONOS PCT % 12   EOS PCT % 7*     Results from last 7 days   Lab Units 22  0519   SODIUM mmol/L 141   POTASSIUM mmol/L 3 6   CHLORIDE mmol/L 107   CO2 mmol/L 28   BUN mg/dL 28*   CREATININE mg/dL 3 16*   ANION GAP mmol/L 6   CALCIUM mg/dL 7 9*   ALBUMIN g/dL 1 9*   TOTAL BILIRUBIN mg/dL 0 40   ALK PHOS U/L 80   ALT U/L 15   AST U/L 19   GLUCOSE RANDOM mg/dL 114     Results from last 7 days   Lab Units 04/25/22  0519   INR  1 68*                   Lines/Drains:  Invasive Devices  Report    Peripherally Inserted Central Catheter Line            PICC Line 46/42/34 Right Basilic 23 days                Central Line:  Goal for removal: Will discontinue when meds requiring line are completed               Imaging: Reviewed radiology reports from this admission including: chest xray    Recent Cultures (last 7 days):         Last 24 Hours Medication List:   Current Facility-Administered Medications   Medication Dose Route Frequency Provider Last Rate    acetaminophen  650 mg Oral Q6H PRN Diana Sheehan PA-C      amLODIPine  5 mg Oral Daily García Conner PA-C      DAPTOmycin  8 mg/kg Intravenous Q48H Megan Patel  mg (04/23/22 1746)    docusate sodium  100 mg Oral BID Elisherwina FABIOLA Sarkar      DULoxetine  60 mg Oral Daily Elisherwina FABIOLA Sarkar      flecainide  100 mg Oral BID Elisherwina FABIOLA Sarkar      gabapentin  100 mg Oral QAM Lonia FABIOLA Sarkar      gabapentin  300 mg Oral HS Elisherwina FABIOLA Sarkar      methocarbamol  750 mg Oral Q6H PRN Prateek Sarkar PA-C      metoprolol succinate  100 mg Oral Daily García Conner PA-C      ondansetron  4 mg Intravenous Q6H PRN Elisherwina FABIOLA Sarkar      oxyCODONE  5 mg Oral Q4H PRN Lonia FABIOLA Sarkar      pantoprazole  40 mg Intravenous Q12H [facility] Amador Braden MD      polyethylene glycol  17 g Oral Daily PRN Elisherwina FABIOLA Sarkar      potassium chloride  40 mEq Oral Daily Prateek Sarkar PA-C      pravastatin  40 mg Oral Daily With Jenelle Edmond PA-C      tamsulosin  0 4 mg Oral Daily With Jenelle Edmond PA-C      warfarin  2 5 mg Oral Daily (warfarin) Mya Pappas PA-C          Today, Patient Was Seen By: Maxi Espinal PA-C    **Please Note: This note may have been constructed using a voice recognition system  **

## 2022-04-26 LAB
ALBUMIN SERPL BCP-MCNC: 2.1 G/DL (ref 3.5–5)
ALP SERPL-CCNC: 91 U/L (ref 46–116)
ALT SERPL W P-5'-P-CCNC: 26 U/L (ref 12–78)
ANION GAP SERPL CALCULATED.3IONS-SCNC: 8 MMOL/L (ref 4–13)
AST SERPL W P-5'-P-CCNC: 27 U/L (ref 5–45)
BASOPHILS # BLD AUTO: 0.08 THOUSANDS/ÂΜL (ref 0–0.1)
BASOPHILS NFR BLD AUTO: 1 % (ref 0–1)
BILIRUB SERPL-MCNC: 0.5 MG/DL (ref 0.2–1)
BUN SERPL-MCNC: 28 MG/DL (ref 5–25)
CALCIUM ALBUM COR SERPL-MCNC: 9.9 MG/DL (ref 8.3–10.1)
CALCIUM SERPL-MCNC: 8.4 MG/DL (ref 8.3–10.1)
CHLORIDE SERPL-SCNC: 106 MMOL/L (ref 100–108)
CK SERPL-CCNC: 36 U/L (ref 39–308)
CO2 SERPL-SCNC: 27 MMOL/L (ref 21–32)
CREAT SERPL-MCNC: 3.51 MG/DL (ref 0.6–1.3)
CREAT UR-MCNC: 36.4 MG/DL
DSDNA AB SER-ACNC: <1 IU/ML (ref 0–9)
EOSINOPHIL # BLD AUTO: 0.44 THOUSAND/ÂΜL (ref 0–0.61)
EOSINOPHIL NFR BLD AUTO: 7 % (ref 0–6)
ERYTHROCYTE [DISTWIDTH] IN BLOOD BY AUTOMATED COUNT: 14.7 % (ref 11.6–15.1)
GFR SERPL CREATININE-BSD FRML MDRD: 16 ML/MIN/1.73SQ M
GLUCOSE SERPL-MCNC: 127 MG/DL (ref 65–140)
GLUCOSE SERPL-MCNC: 99 MG/DL (ref 65–140)
HCT VFR BLD AUTO: 24.4 % (ref 36.5–49.3)
HGB BLD-MCNC: 7.6 G/DL (ref 12–17)
IMM GRANULOCYTES # BLD AUTO: 0.01 THOUSAND/UL (ref 0–0.2)
IMM GRANULOCYTES NFR BLD AUTO: 0 % (ref 0–2)
INR PPP: 1.36 (ref 0.84–1.19)
LYMPHOCYTES # BLD AUTO: 1.4 THOUSANDS/ÂΜL (ref 0.6–4.47)
LYMPHOCYTES NFR BLD AUTO: 24 % (ref 14–44)
MCH RBC QN AUTO: 29 PG (ref 26.8–34.3)
MCHC RBC AUTO-ENTMCNC: 31.1 G/DL (ref 31.4–37.4)
MCV RBC AUTO: 93 FL (ref 82–98)
MONOCYTES # BLD AUTO: 0.64 THOUSAND/ÂΜL (ref 0.17–1.22)
MONOCYTES NFR BLD AUTO: 11 % (ref 4–12)
NEUTROPHILS # BLD AUTO: 3.38 THOUSANDS/ÂΜL (ref 1.85–7.62)
NEUTS SEG NFR BLD AUTO: 57 % (ref 43–75)
NRBC BLD AUTO-RTO: 0 /100 WBCS
PLATELET # BLD AUTO: 180 THOUSANDS/UL (ref 149–390)
PMV BLD AUTO: 9.8 FL (ref 8.9–12.7)
POTASSIUM SERPL-SCNC: 3.6 MMOL/L (ref 3.5–5.3)
PROT SERPL-MCNC: 5.6 G/DL (ref 6.4–8.2)
PROT UR-MCNC: 601 MG/DL
PROT/CREAT UR: 16.51 MG/G{CREAT} (ref 0–0.1)
PROTHROMBIN TIME: 16.6 SECONDS (ref 11.6–14.5)
RBC # BLD AUTO: 2.62 MILLION/UL (ref 3.88–5.62)
SODIUM SERPL-SCNC: 141 MMOL/L (ref 136–145)
WBC # BLD AUTO: 5.95 THOUSAND/UL (ref 4.31–10.16)

## 2022-04-26 PROCEDURE — C9113 INJ PANTOPRAZOLE SODIUM, VIA: HCPCS | Performed by: STUDENT IN AN ORGANIZED HEALTH CARE EDUCATION/TRAINING PROGRAM

## 2022-04-26 PROCEDURE — 82550 ASSAY OF CK (CPK): CPT | Performed by: INTERNAL MEDICINE

## 2022-04-26 PROCEDURE — 82570 ASSAY OF URINE CREATININE: CPT | Performed by: INTERNAL MEDICINE

## 2022-04-26 PROCEDURE — 99232 SBSQ HOSP IP/OBS MODERATE 35: CPT | Performed by: PHYSICIAN ASSISTANT

## 2022-04-26 PROCEDURE — 99233 SBSQ HOSP IP/OBS HIGH 50: CPT | Performed by: INTERNAL MEDICINE

## 2022-04-26 PROCEDURE — 85025 COMPLETE CBC W/AUTO DIFF WBC: CPT | Performed by: STUDENT IN AN ORGANIZED HEALTH CARE EDUCATION/TRAINING PROGRAM

## 2022-04-26 PROCEDURE — 84156 ASSAY OF PROTEIN URINE: CPT | Performed by: INTERNAL MEDICINE

## 2022-04-26 PROCEDURE — 97166 OT EVAL MOD COMPLEX 45 MIN: CPT

## 2022-04-26 PROCEDURE — 82948 REAGENT STRIP/BLOOD GLUCOSE: CPT

## 2022-04-26 PROCEDURE — 97116 GAIT TRAINING THERAPY: CPT

## 2022-04-26 PROCEDURE — 85610 PROTHROMBIN TIME: CPT | Performed by: PHYSICIAN ASSISTANT

## 2022-04-26 PROCEDURE — 80053 COMPREHEN METABOLIC PANEL: CPT | Performed by: STUDENT IN AN ORGANIZED HEALTH CARE EDUCATION/TRAINING PROGRAM

## 2022-04-26 RX ORDER — PANTOPRAZOLE SODIUM 40 MG/1
40 TABLET, DELAYED RELEASE ORAL
Status: DISCONTINUED | OUTPATIENT
Start: 2022-04-26 | End: 2022-04-29 | Stop reason: HOSPADM

## 2022-04-26 RX ADMIN — PRAVASTATIN SODIUM 40 MG: 40 TABLET ORAL at 16:16

## 2022-04-26 RX ADMIN — DOCUSATE SODIUM 100 MG: 100 CAPSULE, LIQUID FILLED ORAL at 18:04

## 2022-04-26 RX ADMIN — METOPROLOL SUCCINATE 100 MG: 50 TABLET, EXTENDED RELEASE ORAL at 10:14

## 2022-04-26 RX ADMIN — WARFARIN SODIUM 2.5 MG: 2.5 TABLET ORAL at 18:04

## 2022-04-26 RX ADMIN — ACETAMINOPHEN 650 MG: 325 TABLET ORAL at 22:02

## 2022-04-26 RX ADMIN — METHOCARBAMOL TABLETS 750 MG: 500 TABLET, COATED ORAL at 22:02

## 2022-04-26 RX ADMIN — POTASSIUM CHLORIDE 40 MEQ: 20 TABLET, EXTENDED RELEASE ORAL at 10:14

## 2022-04-26 RX ADMIN — DULOXETINE 60 MG: 30 CAPSULE, DELAYED RELEASE ORAL at 10:14

## 2022-04-26 RX ADMIN — DOCUSATE SODIUM 100 MG: 100 CAPSULE, LIQUID FILLED ORAL at 10:14

## 2022-04-26 RX ADMIN — ACETAMINOPHEN 650 MG: 325 TABLET ORAL at 16:16

## 2022-04-26 RX ADMIN — FLECAINIDE ACETATE 100 MG: 50 TABLET ORAL at 18:04

## 2022-04-26 RX ADMIN — TAMSULOSIN HYDROCHLORIDE 0.4 MG: 0.4 CAPSULE ORAL at 16:16

## 2022-04-26 RX ADMIN — PANTOPRAZOLE SODIUM 40 MG: 40 TABLET, DELAYED RELEASE ORAL at 16:16

## 2022-04-26 RX ADMIN — GABAPENTIN 100 MG: 100 CAPSULE ORAL at 10:15

## 2022-04-26 RX ADMIN — PANTOPRAZOLE SODIUM 40 MG: 40 INJECTION, POWDER, FOR SOLUTION INTRAVENOUS at 10:15

## 2022-04-26 RX ADMIN — FLECAINIDE ACETATE 100 MG: 50 TABLET ORAL at 10:14

## 2022-04-26 RX ADMIN — GABAPENTIN 300 MG: 300 CAPSULE ORAL at 22:02

## 2022-04-26 RX ADMIN — AMLODIPINE BESYLATE 5 MG: 5 TABLET ORAL at 10:15

## 2022-04-26 NOTE — ASSESSMENT & PLAN NOTE
· Patient presents volume overload, found to have acute anemia, hypoalbuminemia, proteinuria and JANEL with creatinine    · Patient has been receiving vancomycin IV as outpatient for surgical site infection  · Urinary retention protocol  · IV Lasix discontinued  · Echo performed 4/25, reveals mild biatrial dilation, mild mitral and tricuspid regurgitation, EF 88%, grade 2 diastolic dysfunction  · Continue albumin  · Nephrology following  · Infectious Disease recommending daptomycin  · Avoid NSAIDs, hypotension, nephrotoxic agents  · Await serology studies  · Ultimately will need renal biopsy

## 2022-04-26 NOTE — ASSESSMENT & PLAN NOTE
· Pt is 6 weeks status post cervical fusion complicated by MRSA infection   · Was to complete 28 days of vanco IV and then 28 days of oral   · Received day #24/28 of IV vancomycin, transitioned to Daptomycin 4/24 by ID    · Continue while inpatient  · Pharmacy consult for management

## 2022-04-26 NOTE — PROGRESS NOTES
[place]  Progress Note - Praveen Hill 1950, 71 y o  male MRN: [id number]  Unit/Bed#: -01 Encounter: [id number]  Primary Care Provider: Gricel Turk MD   Date and time admitted to hospital: 4/22/2022  2:41 PM    * JANEL (acute kidney injury) [facility])  Assessment & Plan  · Patient presents volume overload, found to have acute anemia, hypoalbuminemia, proteinuria and JANEL with creatinine    · Patient has been receiving vancomycin IV as outpatient for surgical site infection  · Urinary retention protocol  · IV Lasix discontinued  · Echo performed 4/25, reveals mild biatrial dilation, mild mitral and tricuspid regurgitation, EF 53%, grade 2 diastolic dysfunction  · Continue albumin  · Nephrology following  · Infectious Disease recommending daptomycin  · Avoid NSAIDs, hypotension, nephrotoxic agents  · Await serology studies  · Ultimately will need renal biopsy    Surgical site infection  Assessment & Plan  · Pt is 6 weeks status post cervical fusion complicated by MRSA infection   · Was to complete 28 days of vanco IV and then 28 days of oral   · Received day #24/28 of IV vancomycin, transitioned to Daptomycin 4/24 by ID  · Continue while inpatient  · Pharmacy consult for management    Ambulatory dysfunction  Assessment & Plan  · Patient presents with generalized weakness, difficulty with ADLs  · Suspect secondary to volume overload/anasarca, deconditioning from recent hospitalization and anemia  · Improved slightly with transfusion  · PT/OT consult  · Case management consult for likely placement assistance     Anemia  Assessment & Plan  · New normocytic anemia  Previously around 13 prior to surgery in March  May be due to acute illness and renal failure    · CBC showing: Hgb 6 6/Hct 20 9/MCV 95-normal  · Received 1 unit PRBCs 4/22 for hemoglobin of 6 6  · Transfuse if Hgb <7  · B12 and folate low normal  · Iron panel- iron saturation 20%, TIBC 204, iron 41  · FOBT negative    Mixed hyperlipidemia  Assessment & Plan  · Continue statin    Paroxysmal A-fib (HCC)  Assessment & Plan  · Continue metoprolol 100 mg and flecainide  · Held warfarin due to anemia requiring transfusion  · Resumed warfarin 4/25  · Will need to be on heparin drip prior to renal biopsy once this is scheduled    Depression, major, single episode, moderate (HCC)  Assessment & Plan  · Continue Cymbalta    Factor V Leiden mutation [facility])  Assessment & Plan  · Holding home warfarin in setting of anemia requiring trasnfusion  · At home on coumadin 5 mg on Wednesday and Saturday, 2 5 mg all other days  · Continue mechanical VTE prophylaxis  · Resume coumadin 4/25 as FOBT negative  · Will need heparin drip prior to renal biopsy  · Can not be placed on Aranesp   · Hb stable    WILL (obstructive sleep apnea)  Assessment & Plan  · CPAP at bedtime    Essential hypertension  Assessment & Plan  · Currently on amlodipine 5 mg and metoprolol 100 mg daily - changed hold parameters  · Continue Albumin  · IV Lasix 40 mg b i d  dc'd   · Did receive 1 time dose Lasix 4/25      VTE Pharmacologic Prophylaxis: VTE Score: 10 High Risk (Score >/= 5) - Pharmacological DVT Prophylaxis Ordered: warfarin (Coumadin)  Sequential Compression Devices Ordered  Patient Centered Rounds: I performed bedside rounds with nursing staff today  Discussions with Specialists or Other Care Team Provider:  Appreciate most recent Nephrology input, will continue to review with Nephrology today  Education and Discussions with Family / Patient: Updated  (wife) via phone  Time Spent for Care: 45 minutes  More than 50% of total time spent on counseling and coordination of care as described above      Current Length of Stay: 4 day(s)  Current Patient Status: Inpatient   Certification Statement: The patient will continue to require additional inpatient hospital stay due to JANEL  Discharge Plan: Anticipate discharge in >72 hrs to home with home services  Code Status: Level 3 - DNAR and DNI    Subjective:   Reports this is the first day he has felt somewhat better since admission  He reports good urine output today as well  Objective:     Vitals:   Temp (24hrs), Av 7 °F (36 5 °C), Min:97 5 °F (36 4 °C), Max:98 °F (36 7 °C)    Temp:  [97 5 °F (36 4 °C)-98 °F (36 7 °C)] 97 7 °F (36 5 °C)  HR:  [56-61] 58  Resp:  [18] 18  BP: (160-178)/(79-88) 165/83  SpO2:  [94 %-96 %] 96 %  Body mass index is 35 98 kg/m²  Input and Output Summary (last 24 hours): Intake/Output Summary (Last 24 hours) at 2022 1335  Last data filed at 2022 1018  Gross per 24 hour   Intake 440 ml   Output 2225 ml   Net -1785 ml       Physical Exam:   Physical Exam  Vitals and nursing note reviewed  Constitutional:       General: He is not in acute distress  Appearance: Normal appearance  He is well-developed  HENT:      Head: Normocephalic and atraumatic  Eyes:      General: No scleral icterus  Conjunctiva/sclera: Conjunctivae normal    Cardiovascular:      Rate and Rhythm: Normal rate and regular rhythm  Heart sounds: No murmur heard  Pulmonary:      Effort: Pulmonary effort is normal       Breath sounds: No wheezing, rhonchi or rales  Abdominal:      General: There is no distension  Palpations: Abdomen is soft  Skin:     General: Skin is warm and dry  Neurological:      General: No focal deficit present  Mental Status: He is alert     Psychiatric:         Mood and Affect: Mood normal        Additional Data:     Labs:  Results from last 7 days   Lab Units 22  0452   WBC Thousand/uL 5 95   HEMOGLOBIN g/dL 7 6*   HEMATOCRIT % 24 4*   PLATELETS Thousands/uL 180   NEUTROS PCT % 57   LYMPHS PCT % 24   MONOS PCT % 11   EOS PCT % 7*     Results from last 7 days   Lab Units 22  0452   SODIUM mmol/L 141   POTASSIUM mmol/L 3 6   CHLORIDE mmol/L 106   CO2 mmol/L 27   BUN mg/dL 28*   CREATININE mg/dL 3 51*   ANION GAP mmol/L 8   CALCIUM mg/dL 8 4   ALBUMIN g/dL 2 1*   TOTAL BILIRUBIN mg/dL 0 50   ALK PHOS U/L 91   ALT U/L 26   AST U/L 27   GLUCOSE RANDOM mg/dL 99     Results from last 7 days   Lab Units 04/26/22  0452   INR  1 36*     Results from last 7 days   Lab Units 04/26/22  1201   POC GLUCOSE mg/dl 127               Lines/Drains:  Invasive Devices  Report    Peripherally Inserted Central Catheter Line            PICC Line 88/15/13 Right Basilic 24 days                Central Line:  Goal for removal: Will discontinue when meds requiring line are completed               Imaging: Reviewed radiology reports from this admission including: chest xray    Recent Cultures (last 7 days):         Last 24 Hours Medication List:   Current Facility-Administered Medications   Medication Dose Route Frequency Provider Last Rate    acetaminophen  650 mg Oral Q6H PRN Jenny Constantino PA-C      amLODIPine  5 mg Oral Daily García Conner PA-C      DAPTOmycin  8 mg/kg Intravenous Q48H Liliana Lou  mg (04/25/22 1801)    docusate sodium  100 mg Oral BID Mira Haas PA-C      DULoxetine  60 mg Oral Daily Mira Haas PA-C      flecainide  100 mg Oral BID Mira Haas PA-C      gabapentin  100 mg Oral QAM Mira Haas PA-C      gabapentin  300 mg Oral HS Mira Haas PA-C      methocarbamol  750 mg Oral Q6H PRN Mira Haas PA-C      metoprolol succinate  100 mg Oral Daily García Conner PA-C      ondansetron  4 mg Intravenous Q6H PRN Mira Haas PA-C      pantoprazole  40 mg Oral BID YOBANI Preston MD      polyethylene glycol  17 g Oral Daily PRN Mira Haas PA-C      pravastatin  40 mg Oral Daily With Natalie Hernandez PA-C      tamsulosin  0 4 mg Oral Daily With Natalie Hernandez PA-C      warfarin  2 5 mg Oral Daily (warfarin) Zoe Denney PA-C          Today, Patient Was Seen By: Oleksandr Underwood PA-C    **Please Note: This note may have been constructed using a voice recognition system  **

## 2022-04-26 NOTE — OCCUPATIONAL THERAPY NOTE
Occupational Therapy Evaluation     Patient Name: Juan Manuel Reyes  FYWNV'R Date: 4/26/2022  Problem List  Principal Problem:    JANEL (acute kidney injury) ([facility] )  Active Problems:    Essential hypertension    WILL (obstructive sleep apnea)    Factor V Leiden mutation ([facility])    Depression, major, single episode, moderate ([facility])    Paroxysmal A-fib ([facility] )    Mixed hyperlipidemia    Anemia    Surgical site infection    Ambulatory dysfunction    Other proteinuria    Past Medical History  Past Medical History:   Diagnosis Date    Acute venous embolism and thrombosis of deep vessels of proximal lower extremity ([facility])     Last assessed: 5/18/15    Arthritis     Cellulitis     LE    CPAP (continuous positive airway pressure) dependence     Factor V Leiden ([facility] )     Forgetfulness     [place] (hard of hearing)     Paroxysmal atrial fibrillation ([facility])     Last assessed: 11/2/15    Sleep apnea      Past Surgical History  Past Surgical History:   Procedure Laterality Date    BACK SURGERY      Lower    COLON SURGERY      COLONOSCOPY      INCISION AND DRAINAGE POSTERIOR SPINE N/A 4/1/2022    Procedure: Posterior cervical evacuation of postoperative collection and debridement with placement of drains C3-T1; Surgeon: Miguelina Waldron MD;  [facility];  Service: Neurosurgery    FL ARTHRODESIS ANT INTERBODY MIN DISCECTOMY, CERVICAL BELOW C2 N/A 3/11/2022    Procedure: Anterior cervical discectomy and fixation fusion C5/6 and C6/7; Posterior cervical decompression and instrumented fusion C3-T1;   Surgeon: Miguelina Waldron MD;  [facility];  Service: Neurosurgery         04/26/22 0930   OT Last Visit   OT Visit Date 04/26/22   Note Type   Note type Evaluation   Restrictions/Precautions   Weight Bearing Precautions Per Order No   Braces or Orthoses C/S Collar   Other Precautions   (cervical)   Pain Assessment   Pain Assessment Tool Cho-Baker FACES   Cho-Baker FACES Pain Rating 4   Pain Location/Orientation Location: Neck   Home Living   Type of [facility] Two level   Bathroom Shower/Tub Walk-in shower   Bathroom Toilet Standard   Home Equipment Walker   Prior Function   Level of [place] Independent with ADLs and functional mobility   Lives With Spouse   Receives Help From Family   ADL Assistance Independent   IADLs Needs assistance  (Wife performs all home related tasks)   Vocational Retired   Subjective   Subjective Pt received in supine position  Pt agreeable to session  + cervical collar  ADL   Eating Assistance 7  Independent   Grooming Assistance 7  Independent   UB Bathing Assistance 5  Supervision/Setup   LB Bathing Assistance 5  Supervision/Setup   UB Dressing Assistance 5  Supervision/Setup   LB Dressing Assistance 5  Supervision/Setup   LB Dressing Deficit   (via tailor sit method)   Toileting Assistance  5  Supervision/Setup   Bed Mobility   Supine to Sit 5  Supervision   Additional items Verbal cues; Bedrails   Additional Comments Pt remained seated in recliner   Transfers   Sit to Stand 6  Modified independent   Stand to Sit 6  Modified independent   Functional Mobility   Functional Mobility 6  Modified independent   Additional Comments RW   Balance   Static Sitting Good   Dynamic Sitting Fair +   Static Standing Fair +   Dynamic Standing Fair +   Activity Tolerance   Activity Tolerance Patient tolerated treatment well   Medical Staff Made Aware MIRE[address]   RUE Assessment   RUE Assessment WFL   LUE Assessment   LUE Assessment WFL   Cognition   Overall Cognitive Status WFL   Arousal/Participation Alert   Attention Within functional limits   Orientation Level Oriented X4   Memory Within functional limits   Following Commands Follows all commands and directions without difficulty   Assessment   Limitation Decreased high-level ADLs   Prognosis Good   Assessment Pt is a 71 y o  male seen for OT evaluation at [facility], admitted 4/22/2022 w/ JANEL (acute kidney injury) ([facility] [address] )  OT completed expanded review of pt's medical and social history  Comorbidities affecting pt's functional performance at time of assessment include: recent cervical fusion complicated by MRSA infection, hypertension, chronic pain, atrial fibrillation, factor 5 Leiden, hyperlipidemia, anemia  Personal factors affecting pt at time of IE include:steps to enter environment  Prior to admission, pt was living with wife in house with steps to manage  Pt was I w/  ADLS and required assist with IADLS  Required use of RW PTA  Upon evaluation: Pt requires supervision for bed mobility, mod I for functional mobility/transfers, sup for UB ADLs and sup for LB ADLS 2* the following deficits impacting occupational performance: weakness, decreased balance and orthopedic restrictions   No further inpt OT needs indicated  Based on findings, pt is of mod complexity  The patient's raw score on the AM-PAC Daily Activity inpatient short form is 24, standardized score is 57 54, greater than 39 4  Patients at this level are likely to benefit from DC to home, which DOES coincide with CURRENT above OT recommendations  However please refer to therapist recommendation for discharge planning given other factors that may influence destination  At this time, OT recommendations at time of discharge are home with continued family support   Will D/C from OT program    Goals   Patient Goals Pt wishes to get better   Plan   OT Frequency Eval only   Recommendation   OT Discharge Recommendation No rehabilitation needs  (Recommend continued family support)   AM-PAC Daily Activity Inpatient   Lower Body Dressing 4   Bathing 4   Toileting 4   Upper Body Dressing 4   Grooming 4   Eating 4   Daily Activity Raw Score 24   Daily Activity Standardized Score (Calc for Raw Score >=11) 57 54   AM-PAC Applied Cognition Inpatient   Following a Speech/Presentation 4   Understanding Ordinary Conversation 4   Taking Medications 4   Remembering Where Things Are Placed or Put Away 4   Remembering List of 4-5 Errands 4   Taking Care of Complicated Tasks 4   Applied Cognition Raw Score 24   Applied Cognition Standardized Score 62 21           Dede Oden, OTR/L

## 2022-04-26 NOTE — CASE MANAGEMENT
Case Management Discharge Planning Note    Patient name Opal Gonzalez  Location /-51 MRN [id number]  : 1950 Date 2022       Current Admission Date: 2022  Current Admission Diagnosis:JANEL (acute kidney injury) [facility])   Patient Active Problem List    Diagnosis Date Noted    Other proteinuria     Ambulatory dysfunction 2022    JANEL (acute kidney injury) ([facility] ) 2022    Great toe pain, right 04/10/2022    Surgical site infection 2022    Status post cervical spinal fusion 2022    Anemia 2022    Head pain cephalgia 2022    Hyponatremia 2022    Muscle spasm 2022    Goals of care, counseling/discussion 2022    Sinus bradycardia 03/10/2022    Cervical myelopathy ([facility] ) 2022    Weakness 2022    Pes anserinus bursitis of right knee 2021    IFG (impaired fasting glucose) 2021    History of DVT of lower extremity 2021    Mixed hyperlipidemia 10/27/2020    Paroxysmal A-fib ([facility] ) 2020    Lower extremity edema 2020    Primary osteoarthritis of left knee 2020    Primary osteoarthritis of right knee 2020    Depression, major, single episode, moderate (HCC) 10/30/2017    Insomnia 03/10/2017    Lumbar herniated disc 03/10/2017    Erectile dysfunction 2016    Status post catheter ablation of atrial flutter 2015    Essential hypertension 2015    WILL (obstructive sleep apnea) 2015    Factor V Leiden mutation ([facility] ) 2014      LOS (days): 4  Geometric Mean LOS (GMLOS) (days): 4 30  Days to GMLOS:0 3     OBJECTIVE:  Risk of Unplanned Readmission Score: 32         Current admission status: Inpatient   Preferred Pharmacy:   [address], [address]  [address], [address] [address]  Phone: [phone] Fax: [phone]    [id number] [place] [address]  Phone: [phone] Fax: [phone]    Primary Care Provider: Karina Danielle MD    Primary Insurance: [facility]  Secondary Insurance:     DISCHARGE DETAILS:  Additional Comments: Met with Pt re: [facility]/home PT/OT  Pt reports plan to return home with [facility]   Will need to follow up on final ID recs

## 2022-04-26 NOTE — ASSESSMENT & PLAN NOTE
· Currently on amlodipine 5 mg and metoprolol 100 mg daily - changed hold parameters  · Continue Albumin  · IV Lasix 40 mg b i d  dc'd   · Did receive 1 time dose Lasix 4/25

## 2022-04-26 NOTE — ASSESSMENT & PLAN NOTE
· New normocytic anemia  Previously around 13 prior to surgery in March  May be due to acute illness and renal failure    · CBC showing: Hgb 6 6/Hct 20 9/MCV 95-normal  · Received 1 unit PRBCs 4/22 for hemoglobin of 6 6  · Transfuse if Hgb <7  · B12 and folate low normal  · Iron panel- iron saturation 20%, TIBC 204, iron 41  · FOBT negative

## 2022-04-26 NOTE — PROGRESS NOTES
Progress Note - Infectious Disease   Tawana Cox 71 y o  male MRN: [id number]  Unit/Bed#: -01 Encounter: [id number]    Assessment:  70 y/o man with fatigue, JANEL, MRSA surgical site infection / OM     Plan:  1) SSI / OM - Suspect that patient has acute kidney injury due to vancomycin      ===> Will CONTINUE DAPTOMYCIN 8 Mg/Kg  Patient tolerating this agent well  Will continue intravenous therapy while patient is in-house, and plan to transition to oral antibiotics (doxycycline?) at the time of discharge as patient was due to complete intravenous antibiotic therapy on  prior to admission  Will of course discuss with patient's prior infectious diseases provider at [facility]      2) Fatigue / weakness - Favor secondary to acute kidney injury, probable adverse drug reaction due to vancomycin  Regardless, will continue patient on alternate therapy to complete previously arranged course as discussed above      3) JANEL - Will dose abx for pt's renal function, and avoid nephrotoxic agents as able  Further management per Nephrology      ===> Discussed w/ patient's family at bedside  ===> Will follow patient intermittently as he is stable from an ID standpoint  Please do not hesitate to contact ID attending on call should questions arise, or if a change in pt's clinical status should warrant  Subjective/Objective   Overnight, no acute events  Creatinine continues to rise, though patient's wound looks good, is tolerating daptomycin  Denies fever, chills, nausea, vomiting, chest pain, shortness of breath      Temp:  [97 5 °F (36 4 °C)-98 °F (36 7 °C)] 97 7 °F (36 5 °C)  HR:  [56-61] 58  Resp:  [18] 18  BP: (160-178)/(79-88) 165/83  SpO2:  [94 %-96 %] 96 %  Temp (24hrs), Av 7 °F (36 5 °C), Min:97 5 °F (36 4 °C), Max:98 °F (36 7 °C)  Current: Temperature: 97 7 °F (36 5 °C)    Physical Exam:  Gen: AA, NAD, VS reviewed  ENT: MMM  CV: No m/r/g, S1, S2  Pulm: Lungs CTAB, no respiratory distress  ABD: S/NT/ND  Skin: No rash on exposed skin  Psych: Oriented, nl  affect     Invasive Devices  Report    Peripherally Inserted Central Catheter Line            PICC Line 23/96/53 Right Basilic 24 days                Lab, Imaging and other studies: I have personally reviewed pertinent reports     and I have personally reviewed pertinent films in PACS

## 2022-04-26 NOTE — PROGRESS NOTES
Progress Note - Nephrology   Frandy Hickey 71 y o  male MRN: [id number]  Unit/Bed#: -01 Encounter: [id number]    ASSESSMENT and PLAN:  Acute kidney injury (POA):  Etiology: Multifactorial   Suspect secondary to vancomycin toxicity, post infectious GN along with component of prerenal azotemia versus other primary glomerular vasculitis process  · Please refer to Dr Crystal Lira note from 4/25/2022 for full review  Assessment:  · After review of medical records through [address] [place] [address] it appears that the patient  has a baseline Creatinine of 0 6-0 9  · Patient was admitted with a creatinine of 2 65 (prior creatinine 1 10 on 04/14/2022 and 1 88 on  04/18/2022 as out patient)  · Urinalysis on 04/05/2022 was normal  · Patient's creatinine today is at 3 51 >3 16 >2 77  · Suspect in the setting of IV diuretics  · Status post Albumin 25% 25 g x3 doses yesterday  · Urinary output -2 1 L/24 hours  · Acid base and lytes stable   Workup:  · U PCR-18 87  · UA-4+ proteinuria, innumerable RBCs, 0-1 WBCs  · C3/C4-normal  · Serologies-anti-DNA/DS, glomerular basement membrane antibodies, Anca, antinuclear  antibodies, IFA-in process  Plan:  · Avoid nephrotoxins, adjust meds to appropriate GFR  · Optimize hemodynamic status to avoid delay in renal recovery  · Patient examining fairly euvolemic despite having lower extremity edema-one avoid further diuretics  in the setting of worsening creatinine  · Once serologies reviewed may need to consider kidney biopsy for definitive diagnosis-however  patient does have history of factor five Leiden deficiency and currently on Coumadin; which would  need to be held prior  · Continue to monitor renal function closely  · Continue trend I/O, lab values volume status    Nephrotic range proteinuria  Assessment and Plan:  · As above U PCR 18 87  · Currently not in steady state  · However will need to continue to monitor  · As above may require renal biopsy-awaiting serology     Recent MRSA wound infection/surgical site infection/cervical fusion  Assessment and Plan:  · Recent discharge with IV vancomycin-received 23/28 days  · Vanco level 04/25/2022 was 16 6-was as high as 27 9 on 04/20/2022  · Per primary team  · Remains off vancomycin-on daptomycin  · Id following     Microscopic hematuria  Assessment and Plan:  · Likely secondary to post infectious GN  ? C3/C4-normal     Acute on chronic anemia  Assessment and Plan:  · Current hemoglobin 7 6-  · Status post transfusion for hemoglobin of 6 6 on 04/22/2022  · Patient has a history of PE with factor five Leiden mutation  · On Coumadin  · Not a candidate for EARLE  · FOBT -pending  · consider GI consult     Acute diastolic congestive heart failure:  Assessment and plan  · Cardiology following  · Chest x-ray did not reveal any acute cardiopulmonary disease  · Echocardiogram on 4/25/2022 revealed EF of 79%, grade 2 diastolic dysfunction, right ventricle mild  dilated, IVC is normal in size, TR mild regurg, MV mild regurg, no pericardial effusion  · Repeat echo completed today-results pending  · S/P lasix 80 mg IV x 1 yesterday  · Patient examining fairly euvolemic with out respiratory distress, JVD  Has LE edema  · Recommend compression stockings     Elevated BP:  Assessment and Plan:  · BP still elevated 160's  · Amlodipine 5 mg started 4/24/2022  · Consider uptitrate next 24 hours if BP still elevated    Concern for right great toe/knuckle pain-will check uric acid      SUBJECTIVE:  Patient seen and examined at bedside  Patient reports feeling really good today  Denies urinary issues  Denies chest pain shortness of breath nausea vomiting  Concerned about lower extremity swelling  Patient standing on side of bed  Complaining of possible right toe/knuckle pain and is concerned this is gout       OBJECTIVE:  Current Weight: Weight - Scale: 117 kg (257 lb 15 oz)  Vitals:    04/25/22 1605 04/25/22 2227 04/26/22 0300 04/26/22 0713   BP: (!) 178/85 160/79  167/88   BP Location:  Left arm     Pulse: 59 61  61   Resp: 18 18  18   Temp: 97 5 °F (36 4 °C) 98 °F (36 7 °C)  97 7 °F (36 5 °C)   TempSrc:  Oral     SpO2: 94%   94%   Weight:   117 kg (257 lb 15 oz)    Height:           Intake/Output Summary (Last 24 hours) at 4/26/2022 8667  Last data filed at 4/26/2022 0715  Gross per 24 hour   Intake 320 ml   Output 2450 ml   Net -2130 ml     General:  No acute distress, cooperative, sitting on side of bed  Skin:  Warm and dry without rash  ENMT:  Mucous membranes moist, sclera anicteric, cervical neck collar in place  Respiratory:  Essentially clear on auscultation without crackles, rhonchi, wheezes, slight decreased bases  Cardiac:  Regular rate and rhythm without rub, or murmur, positive lower extremity edema,  GI:  Soft, nontender, no distention, active bowel sounds  :  Voiding clear robert urine seen in urinal  Neuro:  Alert oriented and awake  Psych:  Appropriate affect    Medications:    Current Facility-Administered Medications:     acetaminophen (TYLENOL) tablet 650 mg, 650 mg, Oral, Q6H PRN, Suyapa Haynes PA-C, 650 mg at 04/25/22 2156    amLODIPine (NORVASC) tablet 5 mg, 5 mg, Oral, Daily, García Conner PA-C, 5 mg at 04/25/22 0919    DAPTOmycin (CUBICIN) 950 mg in sodium chloride 0 9 % 50 mL IVPB, 8 mg/kg, Intravenous, Q48H, Luz Maria Larios MD, Last Rate: 100 mL/hr at 04/25/22 1801, 950 mg at 04/25/22 1801    docusate sodium (COLACE) capsule 100 mg, 100 mg, Oral, BID, Elder Juárez PA-C, 100 mg at 04/25/22 1730    DULoxetine (CYMBALTA) delayed release capsule 60 mg, 60 mg, Oral, Daily, Elder Juárez PA-C, 60 mg at 04/25/22 0920    flecainide (TAMBOCOR) tablet 100 mg, 100 mg, Oral, BID, Elder Juárez PA-C, 100 mg at 04/25/22 1730    gabapentin (NEURONTIN) capsule 100 mg, 100 mg, Oral, QAM, Elder Juárez PA-C, 100 mg at 04/25/22 0920    gabapentin (NEURONTIN) capsule 300 mg, 300 mg, Oral, HSElder PA-C, 300 mg at 04/25/22 2157    methocarbamol (ROBAXIN) tablet 750 mg, 750 mg, Oral, Q6H PRN, VEENA Vick-ALATGRACIA, 750 mg at 04/25/22 2156    metoprolol succinate (TOPROL-XL) 24 hr tablet 100 mg, 100 mg, Oral, Daily, VEENA Giraldo-C, 100 mg at 04/25/22 2487    ondansetron (ZOFRAN) injection 4 mg, 4 mg, Intravenous, Q6H PRN, Prateek Sarkar PA-C, 4 mg at 04/23/22 1640    pantoprazole (PROTONIX) injection 40 mg, 40 mg, Intravenous, Q12H [address], Amador Braden MD, 40 mg at 04/25/22 2157    polyethylene glycol (MIRALAX) packet 17 g, 17 g, Oral, Daily PRN, Prateek Sarkar PA-C    potassium chloride (K-DUR,KLOR-CON) CR tablet 40 mEq, 40 mEq, Oral, Daily, Prateek Sarkar PA-C, 40 mEq at 04/25/22 1393    pravastatin (PRAVACHOL) tablet 40 mg, 40 mg, Oral, Daily With Amber Pipe, PA-C, 40 mg at 04/25/22 1731    tamsulosin (FLOMAX) capsule 0 4 mg, 0 4 mg, Oral, Daily With Dinner, Prateek Sarkar PA-C, 0 4 mg at 04/25/22 1731    warfarin (COUMADIN) tablet 2 5 mg, 2 5 mg, Oral, Daily (warfarin), VEENA Yarbrough-ALTAGRACIA, 2 5 mg at 04/25/22 1732    Laboratory Results:  Results from last 7 days   Lab Units 04/26/22  0452 04/25/22  0519 04/24/22  0501 04/23/22  1404 04/23/22  0617 04/23/22  0225 04/22/22  1522   WBC Thousand/uL 5 95 5 74 4 52  --  5 11  --  6 34   HEMOGLOBIN g/dL 7 6* 7 5* 7 7* 8 1* 8 2* 8 2* 6 6*   HEMATOCRIT % 24 4* 22 7* 23 2* 25 9* 26 1* 25 9* 20 9*   PLATELETS Thousands/uL 180 166 157  --  194  --  241   SODIUM mmol/L 141 141 140  --  141  --  140   POTASSIUM mmol/L 3 6 3 6 3 6  --  3 9  --  4 2   CHLORIDE mmol/L 106 107 106  --  107  --  105   CO2 mmol/L 27 28 26  --  27  --  27   BUN mg/dL 28* 28* 27*  --  30*  --  30*   CREATININE mg/dL 3 51* 3 16* 2 77*  --  2 79*  --  2 65*   CALCIUM mg/dL 8 4 7 9* 8 2*  --  8 2*  --  8 2*   MAGNESIUM mg/dL  --  1 8  --   --  1 8  --   --    PHOSPHORUS mg/dL  --  4 7*  --   --   --   --   --

## 2022-04-26 NOTE — PLAN OF CARE
Problem: Potential for Falls  Goal: Patient will remain free of falls  Description: INTERVENTIONS:  - Educate patient/family on patient safety including physical limitations  - Instruct patient to call for assistance with activity   - Consult OT/PT to assist with strengthening/mobility   - Keep Call bell within reach  - Keep bed low and locked with side rails adjusted as appropriate  - Keep care items and personal belongings within reach  - Initiate and maintain comfort rounds  - Make Fall Risk Sign visible to staff  - Offer Toileting every 2 Hours, in advance of need  - Initiate/Maintain bed alarm  - Obtain necessary fall risk management equipment: non slip socks  - Apply yellow socks and bracelet for high fall risk patients  - Consider moving patient to room near nurses station  Outcome: Progressing

## 2022-04-26 NOTE — ASSESSMENT & PLAN NOTE
· Holding home warfarin in setting of anemia requiring trasnfusion  · At home on coumadin 5 mg on Wednesday and Saturday, 2 5 mg all other days  · Continue mechanical VTE prophylaxis  · Resume coumadin 4/25 as FOBT negative  · Will need heparin drip prior to renal biopsy  · Can not be placed on Aranesp   · Hb stable

## 2022-04-26 NOTE — PROGRESS NOTES
The pantoprazole has / have been converted to Oral [facility] for Adults as approved by the Pharmacy and Therapeutics Committee  The patient met all eligible criteria:  3 Age = 26years old   2) Received at least one dose of the IV form   3) Receiving at least one other scheduled oral/enteral medication   4) Tolerating an oral/enteral diet   and did not have any exclusions:   1) Critical care patient   2) Active GI bleed (IF assessing H2RAs or PPIs)   3) Continuous tube feeding (IF assessing cipro, doxycycline, levofloxacin, minocycline, rifampin, or voriconazole)   4) Receiving PO vancomycin (IF assessing metronidazole)   5) Persistent nausea and/or vomiting   6) Ileus or gastrointestinal obstruction   7) Vivienne/nasogastric tube set for continuous suction   8) Specific order not to automatically convert to PO (in the order's comments or if discussed in the most recent Infectious Disease or primary team's progress notes)

## 2022-04-26 NOTE — PLAN OF CARE
Problem: Potential for Falls  Goal: Patient will remain free of falls  Description: INTERVENTIONS:  - Educate patient/family on patient safety including physical limitations  - Instruct patient to call for assistance with activity   - Consult OT/PT to assist with strengthening/mobility   - Keep Call bell within reach  - Keep bed low and locked with side rails adjusted as appropriate  - Keep care items and personal belongings within reach  - Initiate and maintain comfort rounds  - Make Fall Risk Sign visible to staff  - Offer Toileting every 2 Hours, in advance of need  - Initiate/Maintain bed alarm  - Obtain necessary fall risk management equipment: non slip socks  - Apply yellow socks and bracelet for high fall risk patients  - Consider moving patient to room near nurses station  Outcome: Progressing     Problem: METABOLIC, FLUID AND ELECTROLYTES - ADULT  Goal: Electrolytes maintained within normal limits  Description: INTERVENTIONS:  - Monitor labs and assess patient for signs and symptoms of electrolyte imbalances  - Administer electrolyte replacement as ordered  - Monitor response to electrolyte replacements, including repeat lab results as appropriate  - Instruct patient on fluid and nutrition as appropriate  Outcome: Progressing     Problem: SKIN/TISSUE INTEGRITY - ADULT  Goal: Incision(s), wounds(s) or drain site(s) healing without S/S of infection  Description: INTERVENTIONS  - Assess and document dressing, incision, wound bed, drain sites and surrounding tissue  - Provide patient and family education  - Perform skin care/dressing changes every day  Outcome: Progressing     Problem: INFECTION - ADULT  Goal: Absence or prevention of progression during hospitalization  Description: INTERVENTIONS:  - Assess and monitor for signs and symptoms of infection  - Monitor lab/diagnostic results  - Monitor all insertion sites, i e  indwelling lines, tubes, and drains  - Monitor endotracheal if appropriate and nasal secretions for changes in amount and color  - [place] appropriate cooling/warming therapies per order  - Administer medications as ordered  - Instruct and encourage patient and family to use good hand hygiene technique  - Identify and instruct in appropriate isolation precautions for identified infection/condition  Outcome: Progressing  Goal: Absence of fever/infection during neutropenic period  Description: INTERVENTIONS:  - Monitor WBC    Outcome: Progressing     Problem: MOBILITY - ADULT  Goal: Maintain or return to baseline ADL function  Description: INTERVENTIONS:  -  Assess patient's ability to carry out ADLs; assess patient's baseline for ADL function and identify physical deficits which impact ability to perform ADLs (bathing, care of mouth/teeth, toileting, grooming, dressing, etc )  - Assess/evaluate cause of self-care deficits   - Assess range of motion  - Assess patient's mobility; develop plan if impaired  - Assess patient's need for assistive devices and provide as appropriate  - Encourage maximum independence but intervene and supervise when necessary  - Involve family in performance of ADLs  - Assess for home care needs following discharge   - Consider OT consult to assist with ADL evaluation and planning for discharge  - Provide patient education as appropriate  Outcome: Progressing

## 2022-04-26 NOTE — PLAN OF CARE
Problem: PHYSICAL THERAPY ADULT  Goal: Performs mobility at highest level of function for planned discharge setting  See evaluation for individualized goals  Description: Treatment/Interventions: ADL retraining,Functional transfer training,LE strengthening/ROM,Elevations,Therapeutic exercise,Endurance training,Patient/family training,Equipment eval/education,Bed mobility,Gait training,Compensatory technique education,Continued evaluation,Spoke to nursing          See flowsheet documentation for full assessment, interventions and recommendations  Note: Prognosis: Good  Problem List: Decreased endurance,Impaired balance,Decreased mobility,Decreased coordination,Decreased range of motion,Obesity,Decreased skin integrity,Pain,Orthopedic restrictions  Assessment: Treatment consisted of balance training, ambulation training, safety awareness, fall prevention, endurance training to increase upright positions and functional mobility  Pt able to ambulate in halls with RW, states he continues to need RW secondary to R great toe pain  Pt able to perform steps this session without physical assistance; Pt would benefit from continued PT while in hospital and follow up with HHCPT at [place] to increase strength, balance, endurance, mobility independence to return to OF, decrease caregiver burden and improve quality of life  The patient's AM-PAC Basic Mobility Inpatient Short Form Raw Score is 24  A Raw score of greater than 17 suggests the patient may benefit from discharge to home with use of RW for all mobility and HHCPT  Please also refer to the recommendation of the Physical Therapist for safe discharge planning  PT Discharge Recommendation: Home with home health rehabilitation          See flowsheet documentation for full assessment

## 2022-04-26 NOTE — PHYSICAL THERAPY NOTE
PHYSICAL THERAPY NOTE     04/26/22 1317   PT Last Visit   PT Visit Date 04/26/22   Note Type   Note Type Treatment   Pain Assessment   Pain Assessment Tool Cho-Baker FACES   Cho-Baker FACES Pain Rating 4   Pain Location/Orientation Orientation: Right  (great toe)   Hospital Pain Intervention(s) Ambulation/increased activity;Repositioned   Restrictions/Precautions   Braces or Orthoses C/S Collar   Other Precautions Contact/isolation  (MRSA)   General   Chart Reviewed Yes   Family/Caregiver Present No   Cognition   Overall Cognitive Status WFL   Arousal/Participation Alert   Attention Within functional limits   Orientation Level Oriented X4   Memory Within functional limits   Following Commands Follows all commands and directions without difficulty   Subjective   Subjective Pt states he feels ok except for his R great toe  Bed Mobility   Additional Comments pt OOB at start and end of PT session   Transfers   Sit to Stand 6  Modified independent   Stand to Sit 6  Modified independent   Ambulation/Elevation   Gait pattern Forward Flexion   Gait Assistance 5  Supervision   Additional items Assist x 1   Assistive Device Rolling walker   Distance 150ft with RW, no LOB or unsteadiness;  pt states he only feels like he wants to use the walker because of his R great toe   Stair Management Assistance 5  Supervision   Additional items Assist x 1   Stair Management Technique One rail R   Number of Stairs 6   Balance   Static Sitting Fair   Dynamic Sitting Fair   Static Standing Fair   Dynamic Standing Fair   Ambulatory Fair   Activity Tolerance   Activity Tolerance   (no adverse effects to PT noted)   Assessment   Prognosis Good   Problem List Decreased endurance; Impaired balance;Decreased mobility; Decreased coordination;Decreased range of motion;Obesity; Decreased skin integrity;Pain;Orthopedic restrictions   Assessment Treatment consisted of balance training, ambulation training, safety awareness, fall prevention, endurance training to increase upright positions and functional mobility  Pt able to ambulate in halls with RW, states he continues to need RW secondary to R great toe pain  Pt able to perform steps this session without physical assistance; Pt would benefit from continued PT while in hospital and follow up with HHCPT at [place] to increase strength, balance, endurance, mobility independence to return to PLOF, decrease caregiver burden and improve quality of life  The patient's AM-PAC Basic Mobility Inpatient Short Form Raw Score is 24  A Raw score of greater than 17 suggests the patient may benefit from discharge to home with use of RW for all mobility and HHCPT  Please also refer to the recommendation of the Physical Therapist for safe discharge planning  Goals   Patient Goals to go home to be with my wife   STG Expiration Date 05/09/22   Plan   Treatment/Interventions ADL retraining;Functional transfer training;LE strengthening/ROM; Elevations; Therapeutic exercise; Endurance training;Patient/family training;Equipment eval/education; Bed mobility;Gait training; Compensatory technique education;Continued evaluation;Spoke to nursing;OT   PT Frequency 3-5x/wk   Recommendation   PT Discharge Recommendation Home with home health rehabilitation   [address] [place] [address] Mobility Inpatient   Turning in Bed Without Bedrails 4   Lying on Back to Sitting on Edge of Flat Bed 4   Moving Bed to Chair 4   Standing Up From Chair 4   Walk in Room 4   Climb 3-5 Stairs 4   Basic Mobility Inpatient Raw Score 24   Basic Mobility Standardized Score 57 68   Highest Level Of Mobility   JH-HLM Goal 8: Walk 250 feet or more   JH-HLM Highest Level of Mobility 7: Walk 25 feet or more   JH-HLM Goal Achieved No       Miles Yoni PT      Patient Name: Concepcion Niño  [id number] Date: 4/26/2022

## 2022-04-26 NOTE — ASSESSMENT & PLAN NOTE
· Continue metoprolol 100 mg and flecainide  · Held warfarin due to anemia requiring transfusion  · Resumed warfarin 4/25  · Will need to be on heparin drip prior to renal biopsy once this is scheduled

## 2022-04-27 LAB
ANA TITR SER IF: NEGATIVE {TITER}
ANION GAP SERPL CALCULATED.3IONS-SCNC: 8 MMOL/L (ref 4–13)
BASOPHILS # BLD AUTO: 0.08 THOUSANDS/ÂΜL (ref 0–0.1)
BASOPHILS NFR BLD AUTO: 1 % (ref 0–1)
BUN SERPL-MCNC: 31 MG/DL (ref 5–25)
CALCIUM SERPL-MCNC: 8.1 MG/DL (ref 8.3–10.1)
CHLORIDE SERPL-SCNC: 105 MMOL/L (ref 100–108)
CO2 SERPL-SCNC: 27 MMOL/L (ref 21–32)
CREAT SERPL-MCNC: 3.75 MG/DL (ref 0.6–1.3)
EOSINOPHIL # BLD AUTO: 0.47 THOUSAND/ÂΜL (ref 0–0.61)
EOSINOPHIL NFR BLD AUTO: 8 % (ref 0–6)
ERYTHROCYTE [DISTWIDTH] IN BLOOD BY AUTOMATED COUNT: 14.6 % (ref 11.6–15.1)
GFR SERPL CREATININE-BSD FRML MDRD: 15 ML/MIN/1.73SQ M
GLUCOSE SERPL-MCNC: 109 MG/DL (ref 65–140)
HCT VFR BLD AUTO: 24.7 % (ref 36.5–49.3)
HGB BLD-MCNC: 7.6 G/DL (ref 12–17)
IMM GRANULOCYTES # BLD AUTO: 0.01 THOUSAND/UL (ref 0–0.2)
IMM GRANULOCYTES NFR BLD AUTO: 0 % (ref 0–2)
INR PPP: 1.4 (ref 0.84–1.19)
LYMPHOCYTES # BLD AUTO: 1.4 THOUSANDS/ÂΜL (ref 0.6–4.47)
LYMPHOCYTES NFR BLD AUTO: 23 % (ref 14–44)
MCH RBC QN AUTO: 29 PG (ref 26.8–34.3)
MCHC RBC AUTO-ENTMCNC: 30.8 G/DL (ref 31.4–37.4)
MCV RBC AUTO: 94 FL (ref 82–98)
MONOCYTES # BLD AUTO: 0.53 THOUSAND/ÂΜL (ref 0.17–1.22)
MONOCYTES NFR BLD AUTO: 9 % (ref 4–12)
NEUTROPHILS # BLD AUTO: 3.71 THOUSANDS/ÂΜL (ref 1.85–7.62)
NEUTS SEG NFR BLD AUTO: 59 % (ref 43–75)
NRBC BLD AUTO-RTO: 0 /100 WBCS
PLATELET # BLD AUTO: 186 THOUSANDS/UL (ref 149–390)
PMV BLD AUTO: 9.9 FL (ref 8.9–12.7)
POTASSIUM SERPL-SCNC: 3.7 MMOL/L (ref 3.5–5.3)
PROTHROMBIN TIME: 16.9 SECONDS (ref 11.6–14.5)
RBC # BLD AUTO: 2.62 MILLION/UL (ref 3.88–5.62)
SODIUM SERPL-SCNC: 140 MMOL/L (ref 136–145)
URATE SERPL-MCNC: 6.7 MG/DL (ref 4.2–8)
WBC # BLD AUTO: 6.2 THOUSAND/UL (ref 4.31–10.16)

## 2022-04-27 PROCEDURE — 84550 ASSAY OF BLOOD/URIC ACID: CPT | Performed by: INTERNAL MEDICINE

## 2022-04-27 PROCEDURE — 85610 PROTHROMBIN TIME: CPT | Performed by: INTERNAL MEDICINE

## 2022-04-27 PROCEDURE — 99232 SBSQ HOSP IP/OBS MODERATE 35: CPT | Performed by: PHYSICIAN ASSISTANT

## 2022-04-27 PROCEDURE — 99232 SBSQ HOSP IP/OBS MODERATE 35: CPT | Performed by: INTERNAL MEDICINE

## 2022-04-27 PROCEDURE — 80048 BASIC METABOLIC PNL TOTAL CA: CPT | Performed by: INTERNAL MEDICINE

## 2022-04-27 PROCEDURE — 85025 COMPLETE CBC W/AUTO DIFF WBC: CPT | Performed by: INTERNAL MEDICINE

## 2022-04-27 RX ORDER — AMLODIPINE BESYLATE 5 MG/1
5 TABLET ORAL 2 TIMES DAILY
Status: DISCONTINUED | OUTPATIENT
Start: 2022-04-27 | End: 2022-04-29 | Stop reason: HOSPADM

## 2022-04-27 RX ADMIN — FLECAINIDE ACETATE 100 MG: 50 TABLET ORAL at 17:38

## 2022-04-27 RX ADMIN — FLECAINIDE ACETATE 100 MG: 50 TABLET ORAL at 08:17

## 2022-04-27 RX ADMIN — AMLODIPINE BESYLATE 5 MG: 5 TABLET ORAL at 21:29

## 2022-04-27 RX ADMIN — ACETAMINOPHEN 650 MG: 325 TABLET ORAL at 17:38

## 2022-04-27 RX ADMIN — DULOXETINE 60 MG: 30 CAPSULE, DELAYED RELEASE ORAL at 08:17

## 2022-04-27 RX ADMIN — PRAVASTATIN SODIUM 40 MG: 40 TABLET ORAL at 17:38

## 2022-04-27 RX ADMIN — AMLODIPINE BESYLATE 5 MG: 5 TABLET ORAL at 08:17

## 2022-04-27 RX ADMIN — DOCUSATE SODIUM 100 MG: 100 CAPSULE, LIQUID FILLED ORAL at 08:16

## 2022-04-27 RX ADMIN — METHOCARBAMOL TABLETS 750 MG: 500 TABLET, COATED ORAL at 21:32

## 2022-04-27 RX ADMIN — GABAPENTIN 100 MG: 100 CAPSULE ORAL at 08:17

## 2022-04-27 RX ADMIN — METOPROLOL SUCCINATE 100 MG: 50 TABLET, EXTENDED RELEASE ORAL at 08:16

## 2022-04-27 RX ADMIN — GABAPENTIN 300 MG: 300 CAPSULE ORAL at 21:29

## 2022-04-27 RX ADMIN — DOCUSATE SODIUM 100 MG: 100 CAPSULE, LIQUID FILLED ORAL at 17:38

## 2022-04-27 RX ADMIN — PANTOPRAZOLE SODIUM 40 MG: 40 TABLET, DELAYED RELEASE ORAL at 18:01

## 2022-04-27 RX ADMIN — DAPTOMYCIN 950 MG: 500 INJECTION, POWDER, LYOPHILIZED, FOR SOLUTION INTRAVENOUS at 17:40

## 2022-04-27 RX ADMIN — WARFARIN SODIUM 2.5 MG: 2.5 TABLET ORAL at 17:38

## 2022-04-27 RX ADMIN — PANTOPRAZOLE SODIUM 40 MG: 40 TABLET, DELAYED RELEASE ORAL at 06:16

## 2022-04-27 RX ADMIN — TAMSULOSIN HYDROCHLORIDE 0.4 MG: 0.4 CAPSULE ORAL at 17:38

## 2022-04-27 NOTE — PLAN OF CARE
Problem: Potential for Falls  Goal: Patient will remain free of falls  Description: INTERVENTIONS:  - Educate patient/family on patient safety including physical limitations  - Instruct patient to call for assistance with activity   - Consult OT/PT to assist with strengthening/mobility   - Keep Call bell within reach  - Keep bed low and locked with side rails adjusted as appropriate  - Keep care items and personal belongings within reach  - Initiate and maintain comfort rounds  - Make Fall Risk Sign visible to staff  - Offer Toileting every 2 Hours, in advance of need  - Initiate/Maintain bed alarm  - Obtain necessary fall risk management equipment: non slip socks  - Apply yellow socks and bracelet for high fall risk patients  - Consider moving patient to room near nurses station  Outcome: Progressing     Problem: METABOLIC, FLUID AND ELECTROLYTES - ADULT  Goal: Electrolytes maintained within normal limits  Description: INTERVENTIONS:  - Monitor labs and assess patient for signs and symptoms of electrolyte imbalances  - Administer electrolyte replacement as ordered  - Monitor response to electrolyte replacements, including repeat lab results as appropriate  - Instruct patient on fluid and nutrition as appropriate  Outcome: Progressing     Problem: SKIN/TISSUE INTEGRITY - ADULT  Goal: Incision(s), wounds(s) or drain site(s) healing without S/S of infection  Description: INTERVENTIONS  - Assess and document dressing, incision, wound bed, drain sites and surrounding tissue  - Provide patient and family education  - Perform skin care/dressing changes every day  Outcome: Progressing     Problem: INFECTION - ADULT  Goal: Absence or prevention of progression during hospitalization  Description: INTERVENTIONS:  - Assess and monitor for signs and symptoms of infection  - Monitor lab/diagnostic results  - Monitor all insertion sites, i e  indwelling lines, tubes, and drains  - Monitor endotracheal if appropriate and nasal secretions for changes in amount and color  - [place] appropriate cooling/warming therapies per order  - Administer medications as ordered  - Instruct and encourage patient and family to use good hand hygiene technique  - Identify and instruct in appropriate isolation precautions for identified infection/condition  Outcome: Progressing  Goal: Absence of fever/infection during neutropenic period  Description: INTERVENTIONS:  - Monitor WBC    Outcome: Progressing     Problem: MOBILITY - ADULT  Goal: Maintain or return to baseline ADL function  Description: INTERVENTIONS:  -  Assess patient's ability to carry out ADLs; assess patient's baseline for ADL function and identify physical deficits which impact ability to perform ADLs (bathing, care of mouth/teeth, toileting, grooming, dressing, etc )  - Assess/evaluate cause of self-care deficits   - Assess range of motion  - Assess patient's mobility; develop plan if impaired  - Assess patient's need for assistive devices and provide as appropriate  - Encourage maximum independence but intervene and supervise when necessary  - Involve family in performance of ADLs  - Assess for home care needs following discharge   - Consider OT consult to assist with ADL evaluation and planning for discharge  - Provide patient education as appropriate  Outcome: Progressing  Goal: Maintains/Returns to pre admission functional level  Description: INTERVENTIONS:  - Perform BMAT or MOVE assessment daily    - Set and communicate daily mobility goal to care team and patient/family/caregiver     - Collaborate with rehabilitation services on mobility goals if consulted  - Out of bed for toileting  - Record patient progress and toleration of activity level   Outcome: Progressing

## 2022-04-27 NOTE — PLAN OF CARE
Problem: Potential for Falls  Goal: Patient will remain free of falls  Description: INTERVENTIONS:  - Educate patient/family on patient safety including physical limitations  - Instruct patient to call for assistance with activity   - Consult OT/PT to assist with strengthening/mobility   - Keep Call bell within reach  - Keep bed low and locked with side rails adjusted as appropriate  - Keep care items and personal belongings within reach  - Initiate and maintain comfort rounds  - Make Fall Risk Sign visible to staff  - Offer Toileting every 2 Hours, in advance of need  - Initiate/Maintain bed alarm  - Obtain necessary fall risk management equipment: non slip socks  - Apply yellow socks and bracelet for high fall risk patients  - Consider moving patient to room near nurses station  Outcome: Progressing     Problem: METABOLIC, FLUID AND ELECTROLYTES - ADULT  Goal: Electrolytes maintained within normal limits  Description: INTERVENTIONS:  - Monitor labs and assess patient for signs and symptoms of electrolyte imbalances  - Administer electrolyte replacement as ordered  - Monitor response to electrolyte replacements, including repeat lab results as appropriate  - Instruct patient on fluid and nutrition as appropriate  Outcome: Progressing     Problem: SKIN/TISSUE INTEGRITY - ADULT  Goal: Incision(s), wounds(s) or drain site(s) healing without S/S of infection  Description: INTERVENTIONS  - Assess and document dressing, incision, wound bed, drain sites and surrounding tissue  - Provide patient and family education  - Perform skin care/dressing changes every day  Outcome: Progressing     Problem: INFECTION - ADULT  Goal: Absence or prevention of progression during hospitalization  Description: INTERVENTIONS:  - Assess and monitor for signs and symptoms of infection  - Monitor lab/diagnostic results  - Monitor all insertion sites, i e  indwelling lines, tubes, and drains  - Monitor endotracheal if appropriate and nasal secretions for changes in amount and color  - [employer] appropriate cooling/warming therapies per order  - Administer medications as ordered  - Instruct and encourage patient and family to use good hand hygiene technique  - Identify and instruct in appropriate isolation precautions for identified infection/condition  Outcome: Progressing  Goal: Absence of fever/infection during neutropenic period  Description: INTERVENTIONS:  - Monitor WBC    Outcome: Progressing     Problem: MOBILITY - ADULT  Goal: Maintain or return to baseline ADL function  Description: INTERVENTIONS:  -  Assess patient's ability to carry out ADLs; assess patient's baseline for ADL function and identify physical deficits which impact ability to perform ADLs (bathing, care of mouth/teeth, toileting, grooming, dressing, etc )  - Assess/evaluate cause of self-care deficits   - Assess range of motion  - Assess patient's mobility; develop plan if impaired  - Assess patient's need for assistive devices and provide as appropriate  - Encourage maximum independence but intervene and supervise when necessary  - Involve family in performance of ADLs  - Assess for home care needs following discharge   - Consider OT consult to assist with ADL evaluation and planning for discharge  - Provide patient education as appropriate  Outcome: Progressing  Goal: Maintains/Returns to pre admission functional level  Description: INTERVENTIONS:  - Perform BMAT or MOVE assessment daily    - Set and communicate daily mobility goal to care team and patient/family/caregiver  - Collaborate with rehabilitation services on mobility goals if consulted  - Perform Range of Motion 3 times a day  - Reposition patient every 3 hours    - Dangle patient 3 times a day  - Stand patient 3 times a day  - Ambulate patient 3 times a day  - Out of bed to chair 3 times a day   - Out of bed for meals 3 times a day  - Out of bed for toileting  - Record patient progress and toleration of activity level   Outcome: Progressing

## 2022-04-27 NOTE — PROGRESS NOTES
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   -baseline creatinine approximately 0 6-0 9   -per record review, patient had increasing creatinine after discharge from hospital   -presents with a creatinine of 2 6   -creatinine increasing today to 3 8   -UA:  4+ protein, innumerable RBCs, 0-1 WBCs  -currently holding diuretics  -previously received IV albumin 25 grams x 3 doses  -previously on vancomycin for treatment of Staph aureus bacteremia    -serologies pending  C3/C4 normal   -check bladder scan    -check renal US   -potentially may need renal biopsy, will need anticoagulation held prior to this  Awaiting results of serologies for further plan of care  -recommend avoiding nephrotoxins, hypotension, IV contrast   -strict I/O  Good urine output, 2 6 liters yesterday  Nephrotic range proteinuria:  -urine protein to creatinine ratio 18 87, however patient currently not in steady state  -repeat urine protein to creatinine ratio 16 5   -eventually may need renal biopsy, awaiting results of serologies  Microscopic hematuria:  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  Ongoing workup  Hypertension:  Blood pressure with fluctuation  Slightly above goal, period of hypotension early this morning   -started on amlodipine 5 milligrams daily 04/24/2022  Also on metoprolol 100 milligrams daily  -will increase amlodipine to 5 milligrams b i d  -recommend holding parameters for systolic blood pressure less than 130   -maintain map greater than 65 mmHg  Acute diastolic CHF:  Echocardiogram with EF of 55 percent and grade 2 pseudonormal relaxation, mild aortic stenosis, IVC normal in size  Remains on room air    -receiving intermittent Lasix, last dose 4/25  Will continue to dose as needed     -weight is decreasing   -chest x-ray negative for acute cardiopulmonary disease   -recommend compression stockings, elevation, low-salt diet for lower extremity edema   -cardiology team following  MRSA/surgical site infection/OM:  -previously treated with vancomycin but had elevation in renal function  -infectious Disease team following, currently on daptomycin with plan to transition to oral doxycycline  Acute on chronic anemia:  -HEMOGLOBIN CURRENTLY 7 6   -will continue to monitor and transfuse as needed for hemoglobin less than 7 0   -history of PE and factor 5 Leiden mutation  -currently on Coumadin  -GI team following  Right upper extremity edema:  -patient is currently on anticoagulation, would consider checking upper extremity duplex if edema worsens although less likely DVT  Other:  Factor 5 Leiden deficiency on Coumadin    Disposition:  Requiring additional stay due to medical needs  SUBJECTIVE:  The patient is sitting in his chair  He denies any chest discomfort or shortness of breath  He denies nausea, vomiting, diarrhea  He reports not having a bowel movement in a couple days  He denies any issues with urination  He feels his lower extremity edema has remained the same  He reports right upper extremity edema      OBJECTIVE:  Current Weight: Weight - Scale: 115 kg (252 lb 10 4 oz)  Vitals:    04/26/22 2155 04/27/22 0600 04/27/22 0733 04/27/22 0828   BP: 159/78  106/60 (!) 176/85   BP Location: Left arm      Pulse: 56  55 58   Resp: 14  16    Temp: 98 3 °F (36 8 °C)  97 8 °F (36 6 °C)    TempSrc: Oral      SpO2: 94%  98% 97%   Weight:  115 kg (252 lb 10 4 oz)     Height:           Intake/Output Summary (Last 24 hours) at 4/27/2022 1202  Last data filed at 4/27/2022 0801  Gross per 24 hour   Intake 1402 ml   Output 2225 ml   Net -823 ml     General: NAD  Skin: warm, dry, intact, no rash  HEENT: Moist mucous membranes, sclera anicteric, normocephalic, atraumatic  Neck: No apparent JVD appreciated, neck brace  Chest: lung sounds clear B/L, on RA   CVS:Regular rate and rhythm, no murmer   Abdomen: Soft, round, non-tender, +BS, obese abdomen  Extremities: B/L LE edema present  Neuro: alert and oriented  Psych: appropriate mood and affect     Medications:    Current Facility-Administered Medications:     acetaminophen (TYLENOL) tablet 650 mg, 650 mg, Oral, Q6H PRN, Miladys Kennedy PA-C, 650 mg at 04/26/22 2202    amLODIPine (NORVASC) tablet 5 mg, 5 mg, Oral, Daily, García Conner, PA-C, 5 mg at 04/27/22 0817    DAPTOmycin (CUBICIN) 950 mg in sodium chloride 0 9 % 50 mL IVPB, 8 mg/kg, Intravenous, Q48H, Richar Paulson MD, Last Rate: 100 mL/hr at 04/25/22 1801, 950 mg at 04/25/22 1801    docusate sodium (COLACE) capsule 100 mg, 100 mg, Oral, BID, Alea Plaster, PA-C, 100 mg at 04/27/22 8735    DULoxetine (CYMBALTA) delayed release capsule 60 mg, 60 mg, Oral, Daily, Alea Plaster, PA-C, 60 mg at 04/27/22 9283    flecainide (TAMBOCOR) tablet 100 mg, 100 mg, Oral, BID, Alea Plaster, PA-C, 100 mg at 04/27/22 4808    gabapentin (NEURONTIN) capsule 100 mg, 100 mg, Oral, QAM, Alea Plaster, PA-C, 100 mg at 04/27/22 4045    gabapentin (NEURONTIN) capsule 300 mg, 300 mg, Oral, HS, Alea Plaster, PA-C, 300 mg at 04/26/22 2202    methocarbamol (ROBAXIN) tablet 750 mg, 750 mg, Oral, Q6H PRN, Alea Plaster, PA-C, 750 mg at 04/26/22 2202    metoprolol succinate (TOPROL-XL) 24 hr tablet 100 mg, 100 mg, Oral, Daily, García Leggett-Shelley, PA-C, 100 mg at 04/27/22 0816    ondansetron (ZOFRAN) injection 4 mg, 4 mg, Intravenous, Q6H PRN, Alea Plaster, PA-C, 4 mg at 04/23/22 1640    pantoprazole (PROTONIX) EC tablet 40 mg, 40 mg, Oral, BID AC, Emeka Hernandez MD, 40 mg at 04/27/22 7810    polyethylene glycol (MIRALAX) packet 17 g, 17 g, Oral, Daily PRN, Alea Plaster, PA-C    pravastatin (PRAVACHOL) tablet 40 mg, 40 mg, Oral, Daily With Dinner, Alea Plascali, PA-C, 40 mg at 04/26/22 1244   tamsulosin (FLOMAX) capsule 0 4 mg, 0 4 mg, Oral, Daily With Dinner, Leslie FABIOLA Watson, 0 4 mg at 04/26/22 1616    warfarin (COUMADIN) tablet 2 5 mg, 2 5 mg, Oral, Daily (warfarin), Monika Alessandro GUZMAN PA-C, 2 5 mg at 04/26/22 1804    Laboratory Results:  Results from last 7 days   Lab Units 04/27/22  0339 04/26/22  0452 04/25/22  0519 04/24/22  0501 04/24/22  0501 04/23/22  1404 04/23/22  0617   WBC Thousand/uL 6 20 5 95 5 74   < > 4 52  --  5 11   HEMOGLOBIN g/dL 7 6* 7 6* 7 5*   < > 7 7*   < > 8 2*   HEMATOCRIT % 24 7* 24 4* 22 7*   < > 23 2*   < > 26 1*   PLATELETS Thousands/uL 186 180 166   < > 157  --  194   SODIUM mmol/L 140 141 141   < > 140  --  141   POTASSIUM mmol/L 3 7 3 6 3 6   < > 3 6  --  3 9   CHLORIDE mmol/L 105 106 107   < > 106  --  107   CO2 mmol/L 27 27 28   < > 26  --  27   BUN mg/dL 31* 28* 28*   < > 27*  --  30*   CREATININE mg/dL 3 75* 3 51* 3 16*   < > 2 77*  --  2 79*   CALCIUM mg/dL 8 1* 8 4 7 9*   < > 8 2*  --  8 2*   MAGNESIUM mg/dL  --   --  1 8  --   --   --  1 8   PHOSPHORUS mg/dL  --   --  4 7*  --   --   --   --    ALK PHOS U/L  --  91 80  --  78  --   --    ALT U/L  --  26 15  --  10*  --   --    AST U/L  --  27 19  --  16  --   --     < > = values in this interval not displayed

## 2022-04-27 NOTE — ASSESSMENT & PLAN NOTE
· Was negative for gout during prior hospitalization  · Has been maintained on antibiotics  · Monitor for signs of infection or swelling and consider orthopedic consult if this worsens

## 2022-04-27 NOTE — PROGRESS NOTES
[place]  Progress Note - Kelley Mcguire 1950, 71 y o  male MRN: [id number]  Unit/Bed#: -01 Encounter: [id number]  Primary Care Provider: Karina Danielle MD   Date and time admitted to hospital: 4/22/2022  2:41 PM    * JANEL (acute kidney injury) [facility])  Assessment & Plan  · Patient presents volume overload, found to have acute anemia, hypoalbuminemia, proteinuria and JANEL with creatinine    · Patient has been receiving vancomycin IV as outpatient for surgical site infection  · Urinary retention protocol  · IV Lasix discontinued  · Echo performed 4/25, reveals mild biatrial dilation, mild mitral and tricuspid regurgitation, EF 44%, grade 2 diastolic dysfunction  · Continue albumin  · Nephrology following  · Infectious Disease recommending daptomycin  · Avoid NSAIDs, hypotension, nephrotoxic agents  · Await serology studies  · C3, C4 normal  · Anti-DNA antibody double stranded: negative  · Ultimately may need renal biopsy, will need to be placed on heparin drip prior    Surgical site infection  Assessment & Plan  · Pt is 6 weeks status post cervical fusion complicated by MRSA infection   · Was to complete 28 days of vanco IV and then 28 days of oral   · Received day #24/28 of IV vancomycin, transitioned to Daptomycin 4/24 by ID    · Continue while inpatient  · Pharmacy consult for management    Glomerulonephritis  Assessment & Plan  · Post-infectious suspected  · Nephrology following  · Considering biopsy however patient would need to be on a heparin drip prior to this as he is on warfarin for factor V Leiden  · Continue to trend BMP daily    Ambulatory dysfunction  Assessment & Plan  · Patient presents with generalized weakness, difficulty with ADLs  · Suspect secondary to volume overload/anasarca, deconditioning from recent hospitalization and anemia  · Improved slightly with transfusion  · PT/OT consult  · Case management consult for likely placement assistance     Great toe pain, right  Assessment & Plan  · Was negative for gout during prior hospitalization  · Has been maintained on antibiotics  · Monitor for signs of infection or swelling and consider orthopedic consult if this worsens    Anemia  Assessment & Plan  · New normocytic anemia  Previously around 13 prior to surgery in March  May be due to acute illness and renal failure  · CBC showing: Hgb 6 6/Hct 20 9/MCV 95-normal  · Received 1 unit PRBCs 4/22 for hemoglobin of 6 6  · Transfuse if Hgb <7  · B12 and folate low normal  · Iron panel- iron saturation 20%, TIBC 204, iron 41  · FOBT negative    Mixed hyperlipidemia  Assessment & Plan  · Continue statin    Paroxysmal A-fib (HCC)  Assessment & Plan  · Continue metoprolol 100 mg and flecainide  · Held warfarin due to anemia requiring transfusion  · Resumed warfarin 4/25  · Will need to be on heparin drip prior to renal biopsy once this is scheduled    Depression, major, single episode, moderate (HCC)  Assessment & Plan  · Continue Cymbalta    Factor V Leiden mutation ([facility] [address] )  Assessment & Plan  · Holding home warfarin in setting of anemia requiring trasnfusion  · At home on coumadin 5 mg on Wednesday and Saturday, 2 5 mg all other days  · Continue mechanical VTE prophylaxis  · Resume coumadin 4/25 as FOBT negative  · Will need heparin drip prior to renal biopsy  · Can not be placed on Aranesp   · Hb stable    WILL (obstructive sleep apnea)  Assessment & Plan  · CPAP at bedtime    Essential hypertension  Assessment & Plan  · Currently on amlodipine 5 mg and metoprolol 100 mg daily - changed hold parameters  · Continue Albumin  · IV Lasix 40 mg b i d  dc'd   · Did receive 1 time dose Lasix 4/25        VTE Pharmacologic Prophylaxis: VTE Score: 10 High Risk (Score >/= 5) - Pharmacological DVT Prophylaxis Ordered: warfarin (Coumadin)  Sequential Compression Devices Ordered  Patient Centered Rounds: I performed bedside rounds with nursing staff today    Discussions with Specialists or Other Care Team Provider:  Appreciate input from most recent Nephrology note    Education and Discussions with Family / Patient: Updated  (wife) via phone  Time Spent for Care: 30 minutes  More than 50% of total time spent on counseling and coordination of care as described above  Current Length of Stay: 5 day(s)  Current Patient Status: Inpatient   Certification Statement: The patient will continue to require additional inpatient hospital stay due to JANEL  Discharge Plan: Anticipate discharge in >72 hrs to home with home services  Code Status: Level 3 - DNAR and DNI    Subjective:   Patient overall feels well today, frustrated with length of hospitalization and wishes to return home his wife  He is having good urine output    Objective:     Vitals:   Temp (24hrs), Av 8 °F (36 6 °C), Min:97 4 °F (36 3 °C), Max:98 3 °F (36 8 °C)    Temp:  [97 4 °F (36 3 °C)-98 3 °F (36 8 °C)] 97 4 °F (36 3 °C)  HR:  [55-60] 60  Resp:  [14-18] 18  BP: (106-176)/(60-87) 167/87  SpO2:  [94 %-98 %] 96 %  Body mass index is 35 24 kg/m²  Input and Output Summary (last 24 hours): Intake/Output Summary (Last 24 hours) at 2022 1600  Last data filed at 2022 1516  Gross per 24 hour   Intake 1302 ml   Output 1525 ml   Net -223 ml       Physical Exam:   Physical Exam  Vitals and nursing note reviewed  Constitutional:       General: He is not in acute distress  Appearance: Normal appearance  He is well-developed  HENT:      Head: Normocephalic and atraumatic  Eyes:      General: No scleral icterus  Conjunctiva/sclera: Conjunctivae normal    Cardiovascular:      Rate and Rhythm: Normal rate and regular rhythm  Heart sounds: No murmur heard  Pulmonary:      Effort: Pulmonary effort is normal       Breath sounds: No wheezing, rhonchi or rales  Abdominal:      General: There is no distension  Palpations: Abdomen is soft     Musculoskeletal:      Right lower leg: Edema present  Left lower leg: Edema present  Skin:     General: Skin is warm and dry  Neurological:      General: No focal deficit present  Mental Status: He is alert  Psychiatric:         Mood and Affect: Mood normal          Additional Data:     Labs:  Results from last 7 days   Lab Units 04/27/22  0339   WBC Thousand/uL 6 20   HEMOGLOBIN g/dL 7 6*   HEMATOCRIT % 24 7*   PLATELETS Thousands/uL 186   NEUTROS PCT % 59   LYMPHS PCT % 23   MONOS PCT % 9   EOS PCT % 8*     Results from last 7 days   Lab Units 04/27/22  0339 04/26/22  0452 04/26/22  0452   SODIUM mmol/L 140   < > 141   POTASSIUM mmol/L 3 7   < > 3 6   CHLORIDE mmol/L 105   < > 106   CO2 mmol/L 27   < > 27   BUN mg/dL 31*   < > 28*   CREATININE mg/dL 3 75*   < > 3 51*   ANION GAP mmol/L 8   < > 8   CALCIUM mg/dL 8 1*   < > 8 4   ALBUMIN g/dL  --   --  2 1*   TOTAL BILIRUBIN mg/dL  --   --  0 50   ALK PHOS U/L  --   --  91   ALT U/L  --   --  26   AST U/L  --   --  27   GLUCOSE RANDOM mg/dL 109   < > 99    < > = values in this interval not displayed  Results from last 7 days   Lab Units 04/27/22  0339   INR  1 40*     Results from last 7 days   Lab Units 04/26/22  1201   POC GLUCOSE mg/dl 127               Lines/Drains:  Invasive Devices  Report    Peripherally Inserted Central Catheter Line            PICC Line 89/15/75 Right Basilic 25 days                Central Line:  Goal for removal: Will discontinue when meds requiring line are completed  Imaging: No pertinent imaging reviewed      Recent Cultures (last 7 days):         Last 24 Hours Medication List:   Current Facility-Administered Medications   Medication Dose Route Frequency Provider Last Rate    acetaminophen  650 mg Oral Q6H PRN Mery Willingham PA-C      amLODIPine  5 mg Oral BID Lai Blessing CRTERRI      DAPTOmycin  8 mg/kg Intravenous Q48H Loren Peñaloza  mg (04/25/22 1801)    docusate sodium  100 mg Oral BID Mary Roberson PA-C      DULoxetine  60 mg Oral Daily Mellissa Buchanan PA-C      flecainide  100 mg Oral BID Mellissa Buchanan PA-C      gabapentin  100 mg Oral QAM Mellissa Buchanan PA-C      gabapentin  300 mg Oral HS Mellissa Buchanan PA-C      methocarbamol  750 mg Oral Q6H PRN Mellissa Buchanan PA-C      metoprolol succinate  100 mg Oral Daily Madelin Conner PA-C      ondansetron  4 mg Intravenous Q6H PRN Mellissa Buchanan PA-C      pantoprazole  40 mg Oral BID AC Josue Soulier, MD      polyethylene glycol  17 g Oral Daily PRN Mellissa Buchanan PA-C      pravastatin  40 mg Oral Daily With Tuan BeanFABIOLA      tamsulosin  0 4 mg Oral Daily With Carron BeanFABIOLA      warfarin  2 5 mg Oral Daily (warfarin) Reagan Romero PA-C          Today, Patient Was Seen By: Ric Cassidy PA-C    **Please Note: This note may have been constructed using a voice recognition system  **

## 2022-04-27 NOTE — ASSESSMENT & PLAN NOTE
· Post-infectious suspected  · Nephrology following  · Considering biopsy however patient would need to be on a heparin drip prior to this as he is on warfarin for factor V Leiden  · Continue to trend BMP daily

## 2022-04-27 NOTE — ASSESSMENT & PLAN NOTE
· Patient presents volume overload, found to have acute anemia, hypoalbuminemia, proteinuria and JANEL with creatinine    · Patient has been receiving vancomycin IV as outpatient for surgical site infection  · Urinary retention protocol  · IV Lasix discontinued  · Echo performed 4/25, reveals mild biatrial dilation, mild mitral and tricuspid regurgitation, EF 82%, grade 2 diastolic dysfunction  · Continue albumin  · Nephrology following  · Infectious Disease recommending daptomycin  · Avoid NSAIDs, hypotension, nephrotoxic agents  · Await serology studies  · C3, C4 normal  · Anti-DNA antibody double stranded: negative  · Ultimately may need renal biopsy, will need to be placed on heparin drip prior

## 2022-04-28 ENCOUNTER — APPOINTMENT (INPATIENT)
Dept: ULTRASOUND IMAGING | Facility: HOSPITAL | Age: 72
DRG: 698 | End: 2022-04-28
Payer: COMMERCIAL

## 2022-04-28 LAB
ANION GAP SERPL CALCULATED.3IONS-SCNC: 8 MMOL/L (ref 4–13)
BUN SERPL-MCNC: 32 MG/DL (ref 5–25)
CALCIUM SERPL-MCNC: 8.3 MG/DL (ref 8.3–10.1)
CHLORIDE SERPL-SCNC: 104 MMOL/L (ref 100–108)
CO2 SERPL-SCNC: 27 MMOL/L (ref 21–32)
CREAT SERPL-MCNC: 3.83 MG/DL (ref 0.6–1.3)
GFR SERPL CREATININE-BSD FRML MDRD: 14 ML/MIN/1.73SQ M
GLUCOSE SERPL-MCNC: 104 MG/DL (ref 65–140)
INR PPP: 1.46 (ref 0.84–1.19)
POTASSIUM SERPL-SCNC: 3.5 MMOL/L (ref 3.5–5.3)
PROTHROMBIN TIME: 17.5 SECONDS (ref 11.6–14.5)
SODIUM SERPL-SCNC: 139 MMOL/L (ref 136–145)

## 2022-04-28 PROCEDURE — 86334 IMMUNOFIX E-PHORESIS SERUM: CPT | Performed by: PATHOLOGY

## 2022-04-28 PROCEDURE — 86334 IMMUNOFIX E-PHORESIS SERUM: CPT | Performed by: NURSE PRACTITIONER

## 2022-04-28 PROCEDURE — 84165 PROTEIN E-PHORESIS SERUM: CPT | Performed by: PATHOLOGY

## 2022-04-28 PROCEDURE — 76770 US EXAM ABDO BACK WALL COMP: CPT

## 2022-04-28 PROCEDURE — 99232 SBSQ HOSP IP/OBS MODERATE 35: CPT | Performed by: INTERNAL MEDICINE

## 2022-04-28 PROCEDURE — 85610 PROTHROMBIN TIME: CPT | Performed by: PHYSICIAN ASSISTANT

## 2022-04-28 PROCEDURE — 84166 PROTEIN E-PHORESIS/URINE/CSF: CPT | Performed by: NURSE PRACTITIONER

## 2022-04-28 PROCEDURE — 80048 BASIC METABOLIC PNL TOTAL CA: CPT | Performed by: INTERNAL MEDICINE

## 2022-04-28 PROCEDURE — 99232 SBSQ HOSP IP/OBS MODERATE 35: CPT | Performed by: PHYSICIAN ASSISTANT

## 2022-04-28 PROCEDURE — 84165 PROTEIN E-PHORESIS SERUM: CPT | Performed by: NURSE PRACTITIONER

## 2022-04-28 PROCEDURE — 84166 PROTEIN E-PHORESIS/URINE/CSF: CPT | Performed by: PATHOLOGY

## 2022-04-28 RX ORDER — LIDOCAINE 50 MG/G
1 PATCH TOPICAL DAILY
Status: DISCONTINUED | OUTPATIENT
Start: 2022-04-28 | End: 2022-04-29 | Stop reason: HOSPADM

## 2022-04-28 RX ADMIN — ACETAMINOPHEN 650 MG: 325 TABLET ORAL at 09:17

## 2022-04-28 RX ADMIN — TAMSULOSIN HYDROCHLORIDE 0.4 MG: 0.4 CAPSULE ORAL at 17:12

## 2022-04-28 RX ADMIN — GABAPENTIN 300 MG: 300 CAPSULE ORAL at 21:21

## 2022-04-28 RX ADMIN — POLYETHYLENE GLYCOL 3350 17 G: 17 POWDER, FOR SOLUTION ORAL at 09:17

## 2022-04-28 RX ADMIN — PANTOPRAZOLE SODIUM 40 MG: 40 TABLET, DELAYED RELEASE ORAL at 17:17

## 2022-04-28 RX ADMIN — PANTOPRAZOLE SODIUM 40 MG: 40 TABLET, DELAYED RELEASE ORAL at 05:36

## 2022-04-28 RX ADMIN — LIDOCAINE 5% 1 PATCH: 700 PATCH TOPICAL at 17:13

## 2022-04-28 RX ADMIN — FLECAINIDE ACETATE 100 MG: 50 TABLET ORAL at 17:13

## 2022-04-28 RX ADMIN — DOCUSATE SODIUM 100 MG: 100 CAPSULE, LIQUID FILLED ORAL at 09:13

## 2022-04-28 RX ADMIN — DULOXETINE 60 MG: 30 CAPSULE, DELAYED RELEASE ORAL at 09:13

## 2022-04-28 RX ADMIN — ACETAMINOPHEN 650 MG: 325 TABLET ORAL at 02:20

## 2022-04-28 RX ADMIN — FLECAINIDE ACETATE 100 MG: 50 TABLET ORAL at 09:12

## 2022-04-28 RX ADMIN — DOCUSATE SODIUM 100 MG: 100 CAPSULE, LIQUID FILLED ORAL at 17:12

## 2022-04-28 RX ADMIN — METHOCARBAMOL TABLETS 750 MG: 500 TABLET, COATED ORAL at 21:21

## 2022-04-28 RX ADMIN — ONDANSETRON 4 MG: 2 INJECTION INTRAMUSCULAR; INTRAVENOUS at 21:21

## 2022-04-28 RX ADMIN — GABAPENTIN 100 MG: 100 CAPSULE ORAL at 09:13

## 2022-04-28 RX ADMIN — PRAVASTATIN SODIUM 40 MG: 40 TABLET ORAL at 17:12

## 2022-04-28 RX ADMIN — AMLODIPINE BESYLATE 5 MG: 5 TABLET ORAL at 21:21

## 2022-04-28 RX ADMIN — ACETAMINOPHEN 650 MG: 325 TABLET ORAL at 17:18

## 2022-04-28 RX ADMIN — ACETAMINOPHEN 650 MG: 325 TABLET ORAL at 21:21

## 2022-04-28 RX ADMIN — WARFARIN SODIUM 2.5 MG: 2.5 TABLET ORAL at 17:13

## 2022-04-28 RX ADMIN — METOPROLOL SUCCINATE 100 MG: 50 TABLET, EXTENDED RELEASE ORAL at 09:14

## 2022-04-28 RX ADMIN — AMLODIPINE BESYLATE 5 MG: 5 TABLET ORAL at 09:12

## 2022-04-28 NOTE — PLAN OF CARE
Problem: Potential for Falls  Goal: Patient will remain free of falls  Description: INTERVENTIONS:  - Educate patient/family on patient safety including physical limitations  - Instruct patient to call for assistance with activity   - Consult OT/PT to assist with strengthening/mobility   - Keep Call bell within reach  - Keep bed low and locked with side rails adjusted as appropriate  - Keep care items and personal belongings within reach  - Initiate and maintain comfort rounds  - Make Fall Risk Sign visible to staff  - Offer Toileting every 2 Hours, in advance of need  - Initiate/Maintain bed alarm  - Obtain necessary fall risk management equipment: non slip socks  - Apply yellow socks and bracelet for high fall risk patients  - Consider moving patient to room near nurses station  Outcome: Progressing     Problem: METABOLIC, FLUID AND ELECTROLYTES - ADULT  Goal: Electrolytes maintained within normal limits  Description: INTERVENTIONS:  - Monitor labs and assess patient for signs and symptoms of electrolyte imbalances  - Administer electrolyte replacement as ordered  - Monitor response to electrolyte replacements, including repeat lab results as appropriate  - Instruct patient on fluid and nutrition as appropriate  Outcome: Progressing     Problem: SKIN/TISSUE INTEGRITY - ADULT  Goal: Incision(s), wounds(s) or drain site(s) healing without S/S of infection  Description: INTERVENTIONS  - Assess and document dressing, incision, wound bed, drain sites and surrounding tissue  - Provide patient and family education  - Perform skin care/dressing changes every day  Outcome: Progressing     Problem: INFECTION - ADULT  Goal: Absence or prevention of progression during hospitalization  Description: INTERVENTIONS:  - Assess and monitor for signs and symptoms of infection  - Monitor lab/diagnostic results  - Monitor all insertion sites, i e  indwelling lines, tubes, and drains  - Monitor endotracheal if appropriate and nasal secretions for changes in amount and color  - [place] appropriate cooling/warming therapies per order  - Administer medications as ordered  - Instruct and encourage patient and family to use good hand hygiene technique  - Identify and instruct in appropriate isolation precautions for identified infection/condition  Outcome: Progressing  Goal: Absence of fever/infection during neutropenic period  Description: INTERVENTIONS:  - Monitor WBC    Outcome: Progressing     Problem: MOBILITY - ADULT  Goal: Maintain or return to baseline ADL function  Description: INTERVENTIONS:  -  Assess patient's ability to carry out ADLs; assess patient's baseline for ADL function and identify physical deficits which impact ability to perform ADLs (bathing, care of mouth/teeth, toileting, grooming, dressing, etc )  - Assess/evaluate cause of self-care deficits   - Assess range of motion  - Assess patient's mobility; develop plan if impaired  - Assess patient's need for assistive devices and provide as appropriate  - Encourage maximum independence but intervene and supervise when necessary  - Involve family in performance of ADLs  - Assess for home care needs following discharge   - Consider OT consult to assist with ADL evaluation and planning for discharge  - Provide patient education as appropriate  Outcome: Progressing  Goal: Maintains/Returns to pre admission functional level  Description: INTERVENTIONS:  - Perform BMAT or MOVE assessment daily    - Set and communicate daily mobility goal to care team and patient/family/caregiver  - Collaborate with rehabilitation services on mobility goals if consulted  - Perform Range of Motion 2 times a day  - Reposition patient every 2 hours    - Dangle patient 2 times a day  - Stand patient 2 times a day  - Ambulate patient 2 times a day  - Out of bed to chair 2 times a day   - Out of bed for meals 2 times a day  - Out of bed for toileting  - Record patient progress and toleration of activity level   Outcome: Progressing

## 2022-04-28 NOTE — ASSESSMENT & PLAN NOTE
· Patient presents volume overload, found to have acute anemia, hypoalbuminemia, proteinuria and JANEL with creatinine    · Patient has been receiving vancomycin IV as outpatient for surgical site infection  · Urinary retention protocol  · IV Lasix discontinued  · Echo performed 4/25, reveals mild biatrial dilation, mild mitral and tricuspid regurgitation, EF 23%, grade 2 diastolic dysfunction  · Continue albumin  · Nephrology following  · Infectious Disease recommending daptomycin  · Avoid NSAIDs, hypotension, nephrotoxic agents  · Await serology studies  · C3, C4 normal  · Anti-DNA antibody double stranded: negative  · Ultimately may need renal biopsy, will need to be placed on heparin drip prior

## 2022-04-28 NOTE — PROGRESS NOTES
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   -baseline creatinine approximately 0 6-0 9   -per record review, patient had increasing creatinine after discharge from hospital   -presents with a creatinine of 2 6   -creatinine increasing today to 3 8  Appears rise is slowing down, hopefully will plateau and start to improve in the next 1-2 days  -UA:  4+ protein, innumerable RBCs, 0-1 WBCs  -currently holding diuretics  -previously received IV albumin 25 grams x 3 doses  -previously on vancomycin for treatment of Staph aureus bacteremia    -C3/C4 normal   Anti double-stranded DNA normal   -Anca and GBM pending   -bladder scan insignificant   -renal ultrasound showed unremarkable echogenicity and contour, negative for hydro   -discussion of possible renal biopsy, discussed with Dr Lizett Quinones, strongly felt to be GN, biopsy would not change course of care  Holding on biopsy for now  -recommend avoiding nephrotoxins, hypotension, IV contrast   -strict I/O   -will need close follow up with renal at discharge  If creatinine stable or improved, could potentially discharge by the weekend      Nephrotic range proteinuria:  -urine protein to creatinine ratio 18 87, however patient currently not in steady state  -repeat urine protein to creatinine ratio 16 5   -eventually may need renal biopsy, awaiting results of serologies      Microscopic hematuria:  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  Ongoing workup      Hypertension:  Blood pressure with fluctuation  Slightly above goal    -currently on amlodipine 5 mg BID started 04/24/2022  Also on metoprolol 100 milligrams daily    -recommend holding parameters for systolic blood pressure less than 130   -maintain map greater than 65 mmHg        Acute diastolic CHF:  Echocardiogram with EF of 55 percent and grade 2 pseudonormal relaxation, mild aortic stenosis, IVC normal in size  Remains on room air    -receiving intermittent Lasix, last dose 4/25  Will continue to dose as needed    -chest x-ray negative for acute cardiopulmonary disease   -recommend compression stockings, elevation, low-salt diet for lower extremity edema   -cardiology team following      MRSA/surgical site infection/OM:  -previously treated with vancomycin but had elevation in renal function  -infectious Disease team following, currently on daptomycin with plan to transition to oral doxycycline        Acute on chronic anemia:  -will continue to monitor and transfuse as needed for hemoglobin less than 7 0   -history of PE and factor 5 Leiden mutation  -currently on Coumadin  -SPEP/UPEP pending  -GI team following      Right upper extremity edema:  -patient is currently on anticoagulation, would consider checking upper extremity duplex if edema worsens although less likely DVT     Other:  Factor 5 Leiden deficiency on Coumadin    Disposition:  Requiring additional stay due to medical needs  SUBJECTIVE:  The patient is resting in his bed  His wife is present during our conversation today VS off on  He denies any chest discomfort or shortness of breath  He reports feeling extremely tired today  He reports intermittent nausea but denies vomiting  He denies any episodes of diarrhea  He denies issues with his urination  He reports having an episode overnight where he was unable to get to the urinal and wet himself in the bed  Use expressing that he would like to go home once he is able two to spend time with his wife who has a terminal illness      OBJECTIVE:  Current Weight: Weight - Scale: 115 kg (253 lb 9 6 oz)  Vitals:    04/27/22 1405 04/27/22 2128 04/28/22 0544 04/28/22 0714   BP: 167/87 161/83  159/80   Pulse: 60 57  59   Resp: 18 19  18   Temp: (!) 97 4 °F (36 3 °C) 97 8 °F (36 6 °C) 97 5 °F (36 4 °C)   TempSrc:       SpO2: 96% 98%  93%   Weight:   115 kg (253 lb 9 6 oz)    Height:           Intake/Output Summary (Last 24 hours) at 4/28/2022 1105  Last data filed at 4/28/2022 0501  Gross per 24 hour   Intake 660 ml   Output 1325 ml   Net -665 ml     General: NAD  Skin: warm, dry, intact, no rash  HEENT: Moist mucous membranes, sclera anicteric, normocephalic, atraumatic  Neck: No apparent JVD appreciated, neck brace  Chest: lung sounds clear B/L, on RA   CVS:Regular rate and rhythm, no murmer   Abdomen: Soft, round, non-tender, +BS  Extremities: B/L LE edema present, RUE edema  Neuro: alert and oriented  Psych: appropriate mood and affect     Medications:    Current Facility-Administered Medications:     acetaminophen (TYLENOL) tablet 650 mg, 650 mg, Oral, Q6H PRN, Cristina Peterson PA-C, 650 mg at 04/28/22 0917    amLODIPine (NORVASC) tablet 5 mg, 5 mg, Oral, BID, Nella Arsalan, CRNP, 5 mg at 04/28/22 0912    DAPTOmycin (CUBICIN) 950 mg in sodium chloride 0 9 % 50 mL IVPB, 8 mg/kg, Intravenous, Q48H, Last Morse MD, Last Rate: 100 mL/hr at 04/27/22 1740, 950 mg at 04/27/22 1740    docusate sodium (COLACE) capsule 100 mg, 100 mg, Oral, BID, Elizabeth Odom PA-C, 100 mg at 04/28/22 0913    DULoxetine (CYMBALTA) delayed release capsule 60 mg, 60 mg, Oral, Daily, Elizabeth Lei, PA-C, 60 mg at 04/28/22 0913    flecainide (TAMBOCOR) tablet 100 mg, 100 mg, Oral, BID, Elizabeth Lei, PA-C, 100 mg at 04/28/22 7777    gabapentin (NEURONTIN) capsule 100 mg, 100 mg, Oral, QAM, Elizabeth Lei, PA-C, 100 mg at 04/28/22 0913    gabapentin (NEURONTIN) capsule 300 mg, 300 mg, Oral, HS, Elizabeth Broach, PA-C, 300 mg at 04/27/22 2129    methocarbamol (ROBAXIN) tablet 750 mg, 750 mg, Oral, Q6H PRN, Elizabeth Odom PA-C, 750 mg at 04/27/22 2132    metoprolol succinate (TOPROL-XL) 24 hr tablet 100 mg, 100 mg, Oral, Daily, García Conner PA-C, 100 mg at 04/28/22 0914    ondansetron (ZOFRAN) injection 4 mg, 4 mg, Intravenous, Q6H PRN, Elena Moelelr, PA-C, 4 mg at 04/23/22 1640    pantoprazole (PROTONIX) EC tablet 40 mg, 40 mg, Oral, BID AC, Court Glynn MD, 40 mg at 04/28/22 0536    polyethylene glycol (MIRALAX) packet 17 g, 17 g, Oral, Daily PRN, Elena Moeller PA-C, 17 g at 04/28/22 2958    pravastatin (PRAVACHOL) tablet 40 mg, 40 mg, Oral, Daily With Alisa Henderson, PA-C, 40 mg at 04/27/22 1738    tamsulosin (FLOMAX) capsule 0 4 mg, 0 4 mg, Oral, Daily With Alisa Henderson PA-C, 0 4 mg at 04/27/22 1738    warfarin (COUMADIN) tablet 2 5 mg, 2 5 mg, Oral, Daily (warfarin), Christina Dewey V PA-C, 2 5 mg at 04/27/22 1738    Laboratory Results:  Results from last 7 days   Lab Units 04/28/22  0535 04/27/22  0339 04/26/22  0452 04/25/22  0519 04/25/22  0519 04/24/22  0501 04/24/22  0501 04/23/22  1404 04/23/22  0617   WBC Thousand/uL  --  6 20 5 95  --  5 74   < > 4 52  --  5 11   HEMOGLOBIN g/dL  --  7 6* 7 6*  --  7 5*   < > 7 7*   < > 8 2*   HEMATOCRIT %  --  24 7* 24 4*  --  22 7*   < > 23 2*   < > 26 1*   PLATELETS Thousands/uL  --  186 180  --  166   < > 157  --  194   SODIUM mmol/L 139 140 141   < > 141   < > 140  --  141   POTASSIUM mmol/L 3 5 3 7 3 6   < > 3 6   < > 3 6  --  3 9   CHLORIDE mmol/L 104 105 106   < > 107   < > 106  --  107   CO2 mmol/L 27 27 27   < > 28   < > 26  --  27   BUN mg/dL 32* 31* 28*   < > 28*   < > 27*  --  30*   CREATININE mg/dL 3 83* 3 75* 3 51*   < > 3 16*   < > 2 77*  --  2 79*   CALCIUM mg/dL 8 3 8 1* 8 4   < > 7 9*   < > 8 2*  --  8 2*   MAGNESIUM mg/dL  --   --   --   --  1 8  --   --   --  1 8   PHOSPHORUS mg/dL  --   --   --   --  4 7*  --   --   --   --    ALK PHOS U/L  --   --  91  --  80  --  78  --   --    ALT U/L  --   --  26  --  15  --  10*  --   --    AST U/L  --   --  27  --  19  --  16  --   --     < > = values in this interval not displayed

## 2022-04-28 NOTE — NURSING NOTE
Pt reports to this nurse, "I am just not feeling good today, I woke up with a bad headache  Is it ok if I just sleep " This nurse administered Tylenol and Miralax and gave patient call bell  At 1 hour reassessment for pain, pt reported that the pain was gone, but he still felt ill

## 2022-04-28 NOTE — PROGRESS NOTES
Pastoral Care Progress Note    2022  Patient: Paulette Olmedo : 1950  Admission Date & Time: 2022 1441  MRN: [id number] [facility]: [id number]                     Chaplaincy Interventions Utilized:   Relationship Building: Cultivated a relationship of care and support  Patient had major neck surgery  He said, "I was sent home too early, and as a result the site of the surgery got infected "  Patient said he has been in the hospital for almost 8 weeks, and it only should have been a few at most  Patient was not bitter about this, but he regrets losing the time at home with his wife, because she has a terminal lung disease  He said, "I can't get this time back " Patient identifies as [facility] and said he and his wife pray every day  The patient shared that he has had two dreams of dying, and when he was dead, he felt very free and easy  As a result, he is not afraid of dying  He and his wife both feel grateful for the lives they have lived, and if he  tomorrow, he would no have regrets      Ritual: Provided prayer     22 1400   Clinical Encounter Type   Visited With Patient   Routine Visit Introduction   Referral To   (census/rounds)   Yazidi Encounters   Yazidi Needs Prayer

## 2022-04-28 NOTE — PROGRESS NOTES
[place]  Progress Note - Opal Gonzalez 1950, 71 y o  male MRN: [id number]  Unit/Bed#: -01 Encounter: [id number]  Primary Care Provider: Renny Aguilar MD   Date and time admitted to hospital: 4/22/2022  2:41 PM    * JANEL (acute kidney injury) [facility])  Assessment & Plan  · Patient presents volume overload, found to have acute anemia, hypoalbuminemia, proteinuria and JANEL with creatinine    · Patient has been receiving vancomycin IV as outpatient for surgical site infection  · Urinary retention protocol  · IV Lasix discontinued  · Echo performed 4/25, reveals mild biatrial dilation, mild mitral and tricuspid regurgitation, EF 75%, grade 2 diastolic dysfunction  · Continue albumin  · Nephrology following  · Infectious Disease recommending daptomycin  · Avoid NSAIDs, hypotension, nephrotoxic agents  · Await serology studies  · C3, C4 normal  · Anti-DNA antibody double stranded: negative  · Ultimately may need renal biopsy, will need to be placed on heparin drip prior    Surgical site infection  Assessment & Plan  · Pt is 6 weeks status post cervical fusion complicated by MRSA infection   · Was to complete 28 days of vanco IV and then 28 days of oral   · Received day #24/28 of IV vancomycin, transitioned to Daptomycin 4/24 by ID    · Continue while inpatient  · Pharmacy consult for management    Glomerulonephritis  Assessment & Plan  · Post-infectious suspected  · Nephrology following  · Considering biopsy however patient would need to be on a heparin drip prior to this as he is on warfarin for factor V Leiden  · Continue to trend BMP daily    Ambulatory dysfunction  Assessment & Plan  · Patient presents with generalized weakness, difficulty with ADLs  · Suspect secondary to volume overload/anasarca, deconditioning from recent hospitalization and anemia  · Improved slightly with transfusion  · PT/OT consult  · Case management consult for likely placement assistance     Great toe pain, right  Assessment & Plan  · Was negative for gout during prior hospitalization  · Has been maintained on antibiotics  · Monitor for signs of infection or swelling and consider orthopedic consult if this worsens    Anemia  Assessment & Plan  · New normocytic anemia  Previously around 13 prior to surgery in March  May be due to acute illness and renal failure  · CBC showing: Hgb 6 6/Hct 20 9/MCV 95-normal  · Received 1 unit PRBCs 4/22 for hemoglobin of 6 6  · Transfuse if Hgb <7  · B12 and folate low normal  · Iron panel- iron saturation 20%, TIBC 204, iron 41  · FOBT negative    Mixed hyperlipidemia  Assessment & Plan  · Continue statin    Paroxysmal A-fib (HCC)  Assessment & Plan  · Continue metoprolol 100 mg and flecainide  · Held warfarin due to anemia requiring transfusion  · Resumed warfarin 4/25  · Will need to be on heparin drip prior to renal biopsy once this is scheduled    Depression, major, single episode, moderate (HCC)  Assessment & Plan  · Continue Cymbalta    Factor V Leiden mutation ([facility] [address] )  Assessment & Plan  · Holding home warfarin in setting of anemia requiring trasnfusion  · At home on coumadin 5 mg on Wednesday and Saturday, 2 5 mg all other days  · Continue mechanical VTE prophylaxis  · Resume coumadin 4/25 as FOBT negative  · Will need heparin drip prior to renal biopsy  · Can not be placed on Aranesp   · Hb stable    WILL (obstructive sleep apnea)  Assessment & Plan  · CPAP at bedtime    Essential hypertension  Assessment & Plan  · Currently on amlodipine 5 mg and metoprolol 100 mg daily - changed hold parameters  · Continue Albumin  · IV Lasix 40 mg b i d  dc'd   · Did receive 1 time dose Lasix 4/25      VTE Pharmacologic Prophylaxis: VTE Score: 10 High Risk (Score >/= 5) - Pharmacological DVT Prophylaxis Ordered: warfarin (Coumadin)  Sequential Compression Devices Ordered  Patient Centered Rounds: I performed bedside rounds with nursing staff today    Discussions with Specialists or Other Care Team Provider: Nephrology and ID    Education and Discussions with Family / Patient: Updated  (wife) via phone  Time Spent for Care: 30 minutes  More than 50% of total time spent on counseling and coordination of care as described above  Current Length of Stay: 6 day(s)  Current Patient Status: Inpatient   Certification Statement: The patient will continue to require additional inpatient hospital stay due to IV antibiotics, monitoring renal function  Discharge Plan: Anticipate discharge in 24-48 hrs to home with home services  Code Status: Level 3 - DNAR and DNI    Subjective:   Patient reports he feels worse today, is stiff in his joints and has some left-sided lower back pain  He does not want to complain too much, reports his wife will be in later to take care of him  Encouraged him to reach out for help with pain management, and ambulate if able  Objective:     Vitals:   Temp (24hrs), Av 6 °F (36 4 °C), Min:97 4 °F (36 3 °C), Max:97 8 °F (36 6 °C)    Temp:  [97 4 °F (36 3 °C)-97 8 °F (36 6 °C)] 97 4 °F (36 3 °C)  HR:  [56-59] 56  Resp:  [18-19] 18  BP: (150-161)/(79-83) 150/79  SpO2:  [93 %-98 %] 98 %  Body mass index is 35 37 kg/m²  Input and Output Summary (last 24 hours): Intake/Output Summary (Last 24 hours) at 2022 1614  Last data filed at 2022 0900  Gross per 24 hour   Intake 1020 ml   Output 1100 ml   Net -80 ml       Physical Exam:   Physical Exam  Vitals and nursing note reviewed  Constitutional:       General: He is not in acute distress  Appearance: Normal appearance  He is well-developed  HENT:      Head: Normocephalic and atraumatic  Eyes:      General: No scleral icterus  Conjunctiva/sclera: Conjunctivae normal    Cardiovascular:      Rate and Rhythm: Normal rate and regular rhythm  Heart sounds: No murmur heard  Pulmonary:      Effort: Pulmonary effort is normal       Breath sounds: No wheezing, rhonchi or rales  Abdominal:      General: There is no distension  Palpations: Abdomen is soft  Musculoskeletal:      Right lower leg: Edema present  Left lower leg: Edema present  Skin:     General: Skin is warm and dry  Neurological:      General: No focal deficit present  Mental Status: He is alert  Psychiatric:         Mood and Affect: Mood normal         Additional Data:     Labs:  Results from last 7 days   Lab Units 04/27/22  0339   WBC Thousand/uL 6 20   HEMOGLOBIN g/dL 7 6*   HEMATOCRIT % 24 7*   PLATELETS Thousands/uL 186   NEUTROS PCT % 59   LYMPHS PCT % 23   MONOS PCT % 9   EOS PCT % 8*     Results from last 7 days   Lab Units 04/28/22  0535 04/27/22  0339 04/26/22  0452   SODIUM mmol/L 139   < > 141   POTASSIUM mmol/L 3 5   < > 3 6   CHLORIDE mmol/L 104   < > 106   CO2 mmol/L 27   < > 27   BUN mg/dL 32*   < > 28*   CREATININE mg/dL 3 83*   < > 3 51*   ANION GAP mmol/L 8   < > 8   CALCIUM mg/dL 8 3   < > 8 4   ALBUMIN g/dL  --   --  2 1*   TOTAL BILIRUBIN mg/dL  --   --  0 50   ALK PHOS U/L  --   --  91   ALT U/L  --   --  26   AST U/L  --   --  27   GLUCOSE RANDOM mg/dL 104   < > 99    < > = values in this interval not displayed  Results from last 7 days   Lab Units 04/28/22  0535   INR  1 46*     Results from last 7 days   Lab Units 04/26/22  1201   POC GLUCOSE mg/dl 127               Lines/Drains:  Invasive Devices  Report    Peripherally Inserted Central Catheter Line            PICC Line 73/90/63 Right Basilic 26 days                Central Line:  Goal for removal: Will discontinue when meds requiring line are completed               Imaging: Reviewed radiology reports from this admission including: ultrasound(s)    Recent Cultures (last 7 days):         Last 24 Hours Medication List:   Current Facility-Administered Medications   Medication Dose Route Frequency Provider Last Rate    acetaminophen  650 mg Oral Q6H PRN Stew Patino PA-C      amLODIPine  5 mg Oral BID Norberto Bull CRNP      DAPTOmycin  8 mg/kg Intravenous Q48H Demetris Thapa  mg (04/27/22 1740)    docusate sodium  100 mg Oral BID Sherryle Pillow, FABIOLA      DULoxetine  60 mg Oral Daily Sherryle Pillow, FABIOLA      flecainide  100 mg Oral BID Sherryle Pillow, FABIOLA      gabapentin  100 mg Oral QAM Sherryle Pillow, FABIOLA      gabapentin  300 mg Oral HS Sherryle Pillow, FABIOLA      lidocaine  1 patch Topical Daily [address] [place] [address]      methocarbamol  750 mg Oral Q6H PRN Sherryle Pillow, FABIOLA      metoprolol succinate  100 mg Oral Daily García Conner PA-C      ondansetron  4 mg Intravenous Q6H PRN Sherryle Pillow, FABIOLA      pantoprazole  40 mg Oral BID AC Carrie Perry MD      polyethylene glycol  17 g Oral Daily PRN Sherryle Pillow, FABIOLA      pravastatin  40 mg Oral Daily With Wu Tay PA-C      tamsulosin  0 4 mg Oral Daily With Wu Tay PA-C      warfarin  2 5 mg Oral Daily (warfarin) Christiano Gonzalez PA-C          Today, Patient Was Seen By: Lex Noriega PA-C    **Please Note: This note may have been constructed using a voice recognition system  **

## 2022-04-29 VITALS
BODY MASS INDEX: 35.01 KG/M2 | OXYGEN SATURATION: 95 % | SYSTOLIC BLOOD PRESSURE: 165 MMHG | HEIGHT: 71 IN | RESPIRATION RATE: 18 BRPM | WEIGHT: 250.1 LBS | TEMPERATURE: 97.6 F | DIASTOLIC BLOOD PRESSURE: 80 MMHG | HEART RATE: 55 BPM

## 2022-04-29 PROBLEM — R35.0 URINARY FREQUENCY: Status: ACTIVE | Noted: 2022-04-29

## 2022-04-29 LAB
ALBUMIN SERPL ELPH-MCNC: 2.73 G/DL (ref 3.5–5)
ALBUMIN SERPL ELPH-MCNC: 51.5 % (ref 52–65)
ALBUMIN UR ELPH-MCNC: 68.7 %
ALPHA1 GLOB MFR UR ELPH: 6 %
ALPHA1 GLOB SERPL ELPH-MCNC: 0.34 G/DL (ref 0.1–0.4)
ALPHA1 GLOB SERPL ELPH-MCNC: 6.4 % (ref 2.5–5)
ALPHA2 GLOB MFR UR ELPH: 9.4 %
ALPHA2 GLOB SERPL ELPH-MCNC: 0.84 G/DL (ref 0.4–1.2)
ALPHA2 GLOB SERPL ELPH-MCNC: 15.8 % (ref 7–13)
ANION GAP SERPL CALCULATED.3IONS-SCNC: 10 MMOL/L (ref 4–13)
ANION GAP SERPL CALCULATED.3IONS-SCNC: 9 MMOL/L (ref 4–13)
B-GLOBULIN MFR UR ELPH: 15.6 %
BETA GLOB ABNORMAL SERPL ELPH-MCNC: 0.31 G/DL (ref 0.4–0.8)
BETA1 GLOB SERPL ELPH-MCNC: 5.9 % (ref 5–13)
BETA2 GLOB SERPL ELPH-MCNC: 10.6 % (ref 2–8)
BETA2+GAMMA GLOB SERPL ELPH-MCNC: 0.56 G/DL (ref 0.2–0.5)
BUN SERPL-MCNC: 32 MG/DL (ref 5–25)
BUN SERPL-MCNC: 32 MG/DL (ref 5–25)
C-ANCA TITR SER IF: NORMAL TITER
CALCIUM SERPL-MCNC: 8.2 MG/DL (ref 8.3–10.1)
CALCIUM SERPL-MCNC: 8.4 MG/DL (ref 8.3–10.1)
CHLORIDE SERPL-SCNC: 103 MMOL/L (ref 100–108)
CHLORIDE SERPL-SCNC: 104 MMOL/L (ref 100–108)
CO2 SERPL-SCNC: 26 MMOL/L (ref 21–32)
CO2 SERPL-SCNC: 28 MMOL/L (ref 21–32)
CREAT SERPL-MCNC: 3.82 MG/DL (ref 0.6–1.3)
CREAT SERPL-MCNC: 3.85 MG/DL (ref 0.6–1.3)
CRP SERPL QL: 31.2 MG/L
ERYTHROCYTE [DISTWIDTH] IN BLOOD BY AUTOMATED COUNT: 14.6 % (ref 11.6–15.1)
ERYTHROCYTE [SEDIMENTATION RATE] IN BLOOD: 46 MM/HOUR (ref 0–19)
GAMMA GLOB ABNORMAL SERPL ELPH-MCNC: 0.52 G/DL (ref 0.5–1.6)
GAMMA GLOB MFR UR ELPH: 0.3 %
GAMMA GLOB SERPL ELPH-MCNC: 9.8 % (ref 12–22)
GBM AB SER IA-ACNC: 3 UNITS (ref 0–20)
GFR SERPL CREATININE-BSD FRML MDRD: 14 ML/MIN/1.73SQ M
GFR SERPL CREATININE-BSD FRML MDRD: 14 ML/MIN/1.73SQ M
GLUCOSE SERPL-MCNC: 104 MG/DL (ref 65–140)
GLUCOSE SERPL-MCNC: 144 MG/DL (ref 65–140)
HCT VFR BLD AUTO: 25 % (ref 36.5–49.3)
HGB BLD-MCNC: 7.8 G/DL (ref 12–17)
IGG/ALB SER: 1.06 {RATIO} (ref 1.1–1.8)
INR PPP: 1.89 (ref 0.84–1.19)
INTERPRETATION UR IFE-IMP: NORMAL
MAGNESIUM SERPL-MCNC: 1.8 MG/DL (ref 1.6–2.6)
MCH RBC QN AUTO: 29.2 PG (ref 26.8–34.3)
MCHC RBC AUTO-ENTMCNC: 31.2 G/DL (ref 31.4–37.4)
MCV RBC AUTO: 94 FL (ref 82–98)
MYELOPEROXIDASE AB SER IA-ACNC: <9 U/ML (ref 0–9)
P-ANCA ATYPICAL TITR SER IF: NORMAL TITER
P-ANCA TITR SER IF: NORMAL TITER
PLATELET # BLD AUTO: 209 THOUSANDS/UL (ref 149–390)
PMV BLD AUTO: 10.2 FL (ref 8.9–12.7)
POTASSIUM SERPL-SCNC: 3.4 MMOL/L (ref 3.5–5.3)
POTASSIUM SERPL-SCNC: 3.5 MMOL/L (ref 3.5–5.3)
PROT PATTERN SERPL ELPH-IMP: ABNORMAL
PROT PATTERN UR ELPH-IMP: ABNORMAL
PROT SERPL-MCNC: 5.3 G/DL (ref 6.4–8.2)
PROT UR-MCNC: 626 MG/DL
PROTEINASE3 AB SER IA-ACNC: <3.5 U/ML (ref 0–3.5)
PROTHROMBIN TIME: 21.3 SECONDS (ref 11.6–14.5)
RBC # BLD AUTO: 2.67 MILLION/UL (ref 3.88–5.62)
SODIUM SERPL-SCNC: 139 MMOL/L (ref 136–145)
SODIUM SERPL-SCNC: 141 MMOL/L (ref 136–145)
WBC # BLD AUTO: 6.07 THOUSAND/UL (ref 4.31–10.16)

## 2022-04-29 PROCEDURE — 85610 PROTHROMBIN TIME: CPT | Performed by: INTERNAL MEDICINE

## 2022-04-29 PROCEDURE — 85652 RBC SED RATE AUTOMATED: CPT | Performed by: INTERNAL MEDICINE

## 2022-04-29 PROCEDURE — 99239 HOSP IP/OBS DSCHRG MGMT >30: CPT | Performed by: PHYSICIAN ASSISTANT

## 2022-04-29 PROCEDURE — 83735 ASSAY OF MAGNESIUM: CPT | Performed by: NURSE PRACTITIONER

## 2022-04-29 PROCEDURE — 80048 BASIC METABOLIC PNL TOTAL CA: CPT | Performed by: INTERNAL MEDICINE

## 2022-04-29 PROCEDURE — 85027 COMPLETE CBC AUTOMATED: CPT | Performed by: NURSE PRACTITIONER

## 2022-04-29 PROCEDURE — 80048 BASIC METABOLIC PNL TOTAL CA: CPT | Performed by: PHYSICIAN ASSISTANT

## 2022-04-29 PROCEDURE — 99232 SBSQ HOSP IP/OBS MODERATE 35: CPT | Performed by: INTERNAL MEDICINE

## 2022-04-29 PROCEDURE — 86140 C-REACTIVE PROTEIN: CPT | Performed by: INTERNAL MEDICINE

## 2022-04-29 RX ORDER — POLYETHYLENE GLYCOL 3350 17 G/17G
17 POWDER, FOR SOLUTION ORAL DAILY PRN
Qty: 30 EACH | Refills: 0 | Status: SHIPPED | OUTPATIENT
Start: 2022-04-29 | End: 2022-06-04

## 2022-04-29 RX ORDER — PANTOPRAZOLE SODIUM 40 MG/1
40 TABLET, DELAYED RELEASE ORAL
Qty: 30 TABLET | Refills: 0 | Status: SHIPPED | OUTPATIENT
Start: 2022-04-29 | End: 2022-06-08 | Stop reason: SDUPTHER

## 2022-04-29 RX ORDER — DOXYCYCLINE 100 MG/1
100 TABLET ORAL 2 TIMES DAILY
Qty: 60 TABLET | Refills: 0 | Status: SHIPPED | OUTPATIENT
Start: 2022-04-29 | End: 2022-06-04

## 2022-04-29 RX ORDER — POTASSIUM CHLORIDE 20 MEQ/1
20 TABLET, EXTENDED RELEASE ORAL
Status: DISCONTINUED | OUTPATIENT
Start: 2022-04-29 | End: 2022-04-29 | Stop reason: HOSPADM

## 2022-04-29 RX ORDER — PANTOPRAZOLE SODIUM 40 MG/1
40 TABLET, DELAYED RELEASE ORAL
Qty: 30 TABLET | Refills: 0 | Status: SHIPPED | OUTPATIENT
Start: 2022-04-29 | End: 2022-04-29

## 2022-04-29 RX ORDER — DOXYCYCLINE 100 MG/1
100 TABLET ORAL 2 TIMES DAILY
Qty: 60 TABLET | Refills: 1 | Status: SHIPPED | OUTPATIENT
Start: 2022-04-29 | End: 2022-04-29 | Stop reason: SDUPTHER

## 2022-04-29 RX ORDER — POLYETHYLENE GLYCOL 3350 17 G/17G
17 POWDER, FOR SOLUTION ORAL DAILY PRN
Qty: 30 EACH | Refills: 0 | Status: SHIPPED | OUTPATIENT
Start: 2022-04-29 | End: 2022-04-29

## 2022-04-29 RX ADMIN — FLECAINIDE ACETATE 100 MG: 50 TABLET ORAL at 17:22

## 2022-04-29 RX ADMIN — LIDOCAINE 5% 1 PATCH: 700 PATCH TOPICAL at 09:36

## 2022-04-29 RX ADMIN — DOCUSATE SODIUM 100 MG: 100 CAPSULE, LIQUID FILLED ORAL at 17:22

## 2022-04-29 RX ADMIN — ACETAMINOPHEN 650 MG: 325 TABLET ORAL at 04:03

## 2022-04-29 RX ADMIN — PRAVASTATIN SODIUM 40 MG: 40 TABLET ORAL at 16:08

## 2022-04-29 RX ADMIN — METOPROLOL SUCCINATE 100 MG: 50 TABLET, EXTENDED RELEASE ORAL at 09:36

## 2022-04-29 RX ADMIN — GABAPENTIN 100 MG: 100 CAPSULE ORAL at 09:35

## 2022-04-29 RX ADMIN — FLECAINIDE ACETATE 100 MG: 50 TABLET ORAL at 09:36

## 2022-04-29 RX ADMIN — AMLODIPINE BESYLATE 5 MG: 5 TABLET ORAL at 09:35

## 2022-04-29 RX ADMIN — DULOXETINE 60 MG: 30 CAPSULE, DELAYED RELEASE ORAL at 09:35

## 2022-04-29 RX ADMIN — DOCUSATE SODIUM 100 MG: 100 CAPSULE, LIQUID FILLED ORAL at 09:35

## 2022-04-29 RX ADMIN — POLYETHYLENE GLYCOL 3350 17 G: 17 POWDER, FOR SOLUTION ORAL at 09:42

## 2022-04-29 RX ADMIN — PANTOPRAZOLE SODIUM 40 MG: 40 TABLET, DELAYED RELEASE ORAL at 16:08

## 2022-04-29 RX ADMIN — TAMSULOSIN HYDROCHLORIDE 0.4 MG: 0.4 CAPSULE ORAL at 16:08

## 2022-04-29 RX ADMIN — WARFARIN SODIUM 2.5 MG: 2.5 TABLET ORAL at 17:22

## 2022-04-29 RX ADMIN — POTASSIUM CHLORIDE 20 MEQ: 20 TABLET, EXTENDED RELEASE ORAL at 09:42

## 2022-04-29 RX ADMIN — PANTOPRAZOLE SODIUM 40 MG: 40 TABLET, DELAYED RELEASE ORAL at 05:00

## 2022-04-29 RX ADMIN — DAPTOMYCIN 950 MG: 500 INJECTION, POWDER, LYOPHILIZED, FOR SOLUTION INTRAVENOUS at 15:59

## 2022-04-29 NOTE — ASSESSMENT & PLAN NOTE
· Continue metoprolol 100 mg and flecainide  · Held warfarin due to anemia requiring transfusion  · Resumed warfarin 4/25  · Continue to monitor INR outpatient

## 2022-04-29 NOTE — PROGRESS NOTES
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   -baseline creatinine approximately 0 6-0 9   -per record review, patient had increasing creatinine after discharge from hospital   -presents with a creatinine of 2 6   -creatinine stable at 3 8, suspect has plateaued and hopefully will start to improve  -UA:  4+ protein, innumerable RBCs, 0-1 WBCs  -previously received IV albumin 25 grams x 3 doses  -previously on vancomycin for treatment of Staph aureus bacteremia    -C3/C4 normal   Anti double-stranded DNA normal   -Anca and GBM pending   -bladder scan insignificant   -renal ultrasound showed unremarkable echogenicity and contour, negative for hydro   -discussion of possible renal biopsy, discussed with Dr Manuel Bay, strongly felt to be GN, biopsy would not change course of care  Holding on biopsy for now  -recommend avoiding nephrotoxins, hypotension, IV contrast   -strict I/O   - May discharge from renal   Message sent office to arrange follow-up  Patient will need weekly lab work starting Monday   -outpatient with Urology for episodes of urinary incontinence      Nephrotic range proteinuria:  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  -urine protein to creatinine ratio 18 87, however patient currently not in steady state  -repeat urine protein to creatinine ratio 16 5   -eventually may need renal biopsy, awaiting results of serologies      Microscopic hematuria:  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   Ongoing workup  Hypokalemia:  -will check Mag level   -will continue to monitor and replace potassium as needed      Hypertension:  Blood pressure with fluctuation   Slightly above goal    -currently on amlodipine 5 mg BID started 04/24/2022  Ai Loan on metoprolol 100 milligrams daily    allowing for permissive hypertension in the setting of JANEL  -if BP remains >160 mmHg, consider adding hydralazine   -recommend holding parameters for systolic blood pressure less than 130   -maintain map greater than 65 mmHg        Acute diastolic CHF:  Echocardiogram with EF of 55 percent and grade 2 pseudonormal relaxation, mild aortic stenosis, IVC normal in size  Remains on room air    -receiving intermittent Lasix, last dose 4/25  Will continue to dose as needed    -chest x-ray negative for acute cardiopulmonary disease   -recommend compression stockings, elevation, low-salt diet for lower extremity edema   -cardiology team following      MRSA/surgical site infection/OM:  -previously treated with vancomycin but had elevation in renal function  -infectious Disease team following, currently on daptomycin with plan to transition to oral doxycycline        Acute on chronic anemia:  -will continue to monitor and transfuse as needed for hemoglobin less than 7 0   -check CBC  -history of PE and factor 5 Leiden mutation  -currently on Coumadin  -SPEP/UPEP pending  -GI team following      Right upper extremity edema:  -patient is currently on anticoagulation, would consider checking upper extremity duplex if edema worsens although less likely DVT     Other:  Factor 5 Leiden deficiency on Coumadin    Disposition:  Requiring additional stay due to medical needs  SUBJECTIVE:  The patient is sitting up in his bed  His wife was present on the phone during our conversation  He denies any chest discomfort or shortness of breath  He denies further nausea or vomiting  He reports not having a bowel movement for 4 days  He reports episodes of urinary incontinence with difficulty making it to the bathroom      OBJECTIVE:  Current Weight: Weight - Scale: 113 kg (250 lb 1 6 oz)  Vitals:    04/28/22 2121 04/28/22 2124 04/29/22 0500 04/29/22 0705   BP: 152/78 152/78  152/78   BP Location:  Left arm     Pulse:  56  61   Resp:  18 18   Temp:  97 6 °F (36 4 °C)  97 9 °F (36 6 °C)   TempSrc:  Oral     SpO2:  96%  94%   Weight:   113 kg (250 lb 1 6 oz)    Height:           Intake/Output Summary (Last 24 hours) at 4/29/2022 0858  Last data filed at 4/29/2022 0272  Gross per 24 hour   Intake 1320 ml   Output 800 ml   Net 520 ml     General: NAD  Skin: warm, dry, intact, no rash  HEENT: Moist mucous membranes, sclera anicteric, normocephalic, atraumatic  Neck: No apparent JVD appreciated, neck brace  Chest: lung sounds clear B/L, on RA   CVS:Regular rate and rhythm, no murmer   Abdomen: Soft, round, non-tender, +BS  Extremities: B/L LE edema present  Neuro: alert and oriented  Psych: appropriate mood and affect     Medications:    Current Facility-Administered Medications:     acetaminophen (TYLENOL) tablet 650 mg, 650 mg, Oral, Q6H PRN, Barbara Chirinos PA-C, 650 mg at 04/29/22 0403    amLODIPine (NORVASC) tablet 5 mg, 5 mg, Oral, BID, Omega Neida, CRNP, 5 mg at 04/28/22 2121    DAPTOmycin (CUBICIN) 950 mg in sodium chloride 0 9 % 50 mL IVPB, 8 mg/kg, Intravenous, Q48H, Luis Carlos Robertson MD, Last Rate: 100 mL/hr at 04/27/22 1740, 950 mg at 04/27/22 1740    docusate sodium (COLACE) capsule 100 mg, 100 mg, Oral, BID, VEENA Schafer-C, 100 mg at 04/28/22 1712    DULoxetine (CYMBALTA) delayed release capsule 60 mg, 60 mg, Oral, Daily, Antonio Neumann PA-C, 60 mg at 04/28/22 0913    flecainide (TAMBOCOR) tablet 100 mg, 100 mg, Oral, BID, Antonio Neumann PA-C, 100 mg at 04/28/22 1713    gabapentin (NEURONTIN) capsule 100 mg, 100 mg, Oral, QAM, Antonio Neumann PA-C, 100 mg at 04/28/22 0913    gabapentin (NEURONTIN) capsule 300 mg, 300 mg, Oral, HS, Antonio Neumann PA-C, 300 mg at 04/28/22 2121    lidocaine (LIDODERM) 5 % patch 1 patch, 1 patch, Topical, Daily, Marj GUZMAN PA-C, 1 patch at 04/28/22 1713    methocarbamol (ROBAXIN) tablet 750 mg, 750 mg, Oral, Q6H PRN, Antonio Neumann PA-C, 750 mg at 04/28/22 2121    metoprolol succinate (TOPROL-XL) 24 hr tablet 100 mg, 100 mg, Oral, Daily, García Conner PA-C, 100 mg at 04/28/22 0914    ondansetron (ZOFRAN) injection 4 mg, 4 mg, Intravenous, Q6H PRN, Keshia Olguin PA-C, 4 mg at 04/28/22 2121    pantoprazole (PROTONIX) EC tablet 40 mg, 40 mg, Oral, BID AC, Lisa Thomas MD, 40 mg at 04/29/22 0500    polyethylene glycol (MIRALAX) packet 17 g, 17 g, Oral, Daily PRN, Keshia Olguin PA-C, 17 g at 04/28/22 5879    pravastatin (PRAVACHOL) tablet 40 mg, 40 mg, Oral, Daily With Grace Melton PA-C, 40 mg at 04/28/22 1712    tamsulosin (FLOMAX) capsule 0 4 mg, 0 4 mg, Oral, Daily With Dinner, Keshia Olguin PA-C, 0 4 mg at 04/28/22 1712    warfarin (COUMADIN) tablet 2 5 mg, 2 5 mg, Oral, Daily (warfarin), Renata GUZMAN PA-C, 2 5 mg at 04/28/22 1713    Laboratory Results:  Results from last 7 days   Lab Units 04/29/22  0410 04/28/22  0535 04/27/22  0339 04/26/22  0452 04/26/22  0452 04/25/22  0519 04/25/22  0519 04/24/22  0501 04/24/22  0501 04/23/22  1404 04/23/22  0617   WBC Thousand/uL  --   --  6 20  --  5 95  --  5 74   < > 4 52  --  5 11   HEMOGLOBIN g/dL  --   --  7 6*  --  7 6*  --  7 5*   < > 7 7*   < > 8 2*   HEMATOCRIT %  --   --  24 7*  --  24 4*  --  22 7*   < > 23 2*   < > 26 1*   PLATELETS Thousands/uL  --   --  186  --  180  --  166   < > 157  --  194   SODIUM mmol/L 141 139 140   < > 141   < > 141   < > 140  --  141   POTASSIUM mmol/L 3 4* 3 5 3 7   < > 3 6   < > 3 6   < > 3 6  --  3 9   CHLORIDE mmol/L 104 104 105   < > 106   < > 107   < > 106  --  107   CO2 mmol/L 28 27 27   < > 27   < > 28   < > 26  --  27   BUN mg/dL 32* 32* 31*   < > 28*   < > 28*   < > 27*  --  30*   CREATININE mg/dL 3 82* 3 83* 3 75*   < > 3 51*   < > 3 16*   < > 2 77*  --  2 79*   CALCIUM mg/dL 8 2* 8 3 8 1*   < > 8 4   < > 7 9*   < > 8 2*  --  8 2*   MAGNESIUM mg/dL  --   --   --   --   --   --  1 8  --   --   --  1 8   PHOSPHORUS mg/dL  --   --   --   --   --   --  4 7*  --   --   -- --    ALK PHOS U/L  --   --   --   --  91  --  80  --  78  --   --    ALT U/L  --   --   --   --  26  --  15  --  10*  --   --    AST U/L  --   --   --   --  27  --  19  --  16  --   --     < > = values in this interval not displayed

## 2022-04-29 NOTE — PLAN OF CARE
Problem: Potential for Falls  Goal: Patient will remain free of falls  Description: INTERVENTIONS:  - Educate patient/family on patient safety including physical limitations  - Instruct patient to call for assistance with activity   - Consult OT/PT to assist with strengthening/mobility   - Keep Call bell within reach  - Keep bed low and locked with side rails adjusted as appropriate  - Keep care items and personal belongings within reach  - Initiate and maintain comfort rounds  - Make Fall Risk Sign visible to staff  - Offer Toileting every 2 Hours, in advance of need  - Initiate/Maintain bed alarm  - Obtain necessary fall risk management equipment: non slip socks  - Apply yellow socks and bracelet for high fall risk patients  - Consider moving patient to room near nurses station  Outcome: Progressing     Problem: METABOLIC, FLUID AND ELECTROLYTES - ADULT  Goal: Electrolytes maintained within normal limits  Description: INTERVENTIONS:  - Monitor labs and assess patient for signs and symptoms of electrolyte imbalances  - Administer electrolyte replacement as ordered  - Monitor response to electrolyte replacements, including repeat lab results as appropriate  - Instruct patient on fluid and nutrition as appropriate  Outcome: Progressing     Problem: SKIN/TISSUE INTEGRITY - ADULT  Goal: Incision(s), wounds(s) or drain site(s) healing without S/S of infection  Description: INTERVENTIONS  - Assess and document dressing, incision, wound bed, drain sites and surrounding tissue  - Provide patient and family education  - Perform skin care/dressing changes every day  Outcome: Progressing     Problem: INFECTION - ADULT  Goal: Absence or prevention of progression during hospitalization  Description: INTERVENTIONS:  - Assess and monitor for signs and symptoms of infection  - Monitor lab/diagnostic results  - Monitor all insertion sites, i e  indwelling lines, tubes, and drains  - Monitor endotracheal if appropriate and nasal secretions for changes in amount and color  - [place] appropriate cooling/warming therapies per order  - Administer medications as ordered  - Instruct and encourage patient and family to use good hand hygiene technique  - Identify and instruct in appropriate isolation precautions for identified infection/condition  Outcome: Progressing  Goal: Absence of fever/infection during neutropenic period  Description: INTERVENTIONS:  - Monitor WBC    Outcome: Progressing     Problem: MOBILITY - ADULT  Goal: Maintain or return to baseline ADL function  Description: INTERVENTIONS:  -  Assess patient's ability to carry out ADLs; assess patient's baseline for ADL function and identify physical deficits which impact ability to perform ADLs (bathing, care of mouth/teeth, toileting, grooming, dressing, etc )  - Assess/evaluate cause of self-care deficits   - Assess range of motion  - Assess patient's mobility; develop plan if impaired  - Assess patient's need for assistive devices and provide as appropriate  - Encourage maximum independence but intervene and supervise when necessary  - Involve family in performance of ADLs  - Assess for home care needs following discharge   - Consider OT consult to assist with ADL evaluation and planning for discharge  - Provide patient education as appropriate  Outcome: Progressing  Goal: Maintains/Returns to pre admission functional level  Description: INTERVENTIONS:  - Perform BMAT or MOVE assessment daily    - Set and communicate daily mobility goal to care team and patient/family/caregiver  - Collaborate with rehabilitation services on mobility goals if consulted  - Perform Range of Motion 2 times a day  - Reposition patient every 2 hours    - Dangle patient 2 times a day  - Stand patient 2 times a day  - Ambulate patient 2 times a day  - Out of bed to chair 2 times a day   - Out of bed for meals 2 times a day  - Out of bed for toileting  - Record patient progress and toleration of activity level   Outcome: Progressing

## 2022-04-29 NOTE — ASSESSMENT & PLAN NOTE
· Pt is 6 weeks status post cervical fusion complicated by MRSA infection   · Was to complete 28 days of vanco IV and then 28 days of oral   · Received day #24/28 of IV vancomycin, transitioned to Daptomycin 4/24 by ID  · Continue on doxycycline 100 mg b i d   At discharge until patient follows up with [facility]  · Continue while inpatient  · Pharmacy consult for management

## 2022-04-29 NOTE — ASSESSMENT & PLAN NOTE
· Holding home warfarin in setting of anemia requiring trasnfusion  · At home on coumadin 5 mg on Wednesday and Saturday, 2 5 mg all other days  · Continue mechanical VTE prophylaxis  · Resume coumadin 4/25 as FOBT negative  · Can not be placed on Aranesp   · Hb stable

## 2022-04-29 NOTE — ASSESSMENT & PLAN NOTE
· Patient presents with generalized weakness, difficulty with ADLs  · Suspect secondary to volume overload/anasarca, deconditioning from recent hospitalization and anemia  · Improved slightly with transfusion  · PT/OT consult- appropriate for return home with home health care

## 2022-04-29 NOTE — ASSESSMENT & PLAN NOTE
· Post-infectious suspected  · Nephrology following  · Considering biopsy however patient would need to be on a heparin drip prior to this as he is on warfarin for factor V Leiden  · And BMP weekly outpatient  · Creatinine stabilized at 3 8 prior to discharge, cleared for discharge by Nephrology with close outpatient follow-up

## 2022-04-29 NOTE — ASSESSMENT & PLAN NOTE
· Patient presents volume overload, found to have acute anemia, hypoalbuminemia, proteinuria and JANEL with creatinine    · Patient has been receiving vancomycin IV as outpatient for surgical site infection  · Urinary retention protocol  · IV Lasix discontinued  · Echo performed 4/25, reveals mild biatrial dilation, mild mitral and tricuspid regurgitation, EF 88%, grade 2 diastolic dysfunction  · Continue albumin  · Nephrology following  · Infectious Disease recommending continuing doxycycline 100 mg b i d   Until patient follows up with [facility] postoperatively  · Avoid NSAIDs, hypotension, nephrotoxic agents  · Await serology studies  · C3, C4 normal  · Anti-DNA antibody double stranded: negative  · Anti-neutrophilic cytoplasmic antibody:  Pending  · Glomerular basement membrane antibody:  Pending  · Antinuclear antibodies IFA:  Negative  · SPEP:  Pending  · UPEP:  Pending  · Creatinine has stabilized at 3 8, we will need repeat BMP Monday 5/2, repeat weekly and have close outpatient follow-up with Nephrology

## 2022-04-29 NOTE — DISCHARGE INSTRUCTIONS
Acute Kidney Injury (JANEL)  You have been diagnosed with Acute Kidney Injury (JANEL)  The following information has been developed to provide you with information about JANEL and treatment  What is Acute Kidney Injury (JANEL)? JANEL occurs when kidney function decreases over a short period of time  This condition causes a buildup of waste products in the blood and can cause fluid to build up causing swelling in the legs and shortness of breath  Sometimes called Acute Kidney Failure or Acute Renal Failure, JANEL is often reversible if it is found and treated quickly  How do you know if you have JANEL? JANEL is diagnosed by assessing kidney function  This is done by obtaining a blood test to measure the blood level of creatinine  Decreased urine output can sometimes also indicate JANEL  Who is at risk for JANEL? JANEL can happen to anyone but usually happens to people who are already sick and may be in the hospital  People are at higher risk for JANEL if they have any of the following:   age 72 years or older   high or low blood pressure   underlying kidney disease (e g , Chronic Kidney Disease (CKD)   peripheral vascular disease (hardening of arteries)   chronic diseases such as liver disease, heart disease and diabetes   a single kidney    What are the symptoms of JANEL? You may or may not have the symptoms to suggest you have kidney injury until the JANEL has progressed  Some of the symptoms are listed below:   Not making enough urine   Increased swelling in legs    Feeling tired   Trouble breathing or shortness of breath   Nausea   New or worsening confusion    What causes JANEL?   The causes are divided into three categories:   Not enough blood flowing to the kidneys (e g , low blood pressure, bleeding, diarrhea, dehydration)   Injury directly to the kidneys (e g , blood clots, severe infections such as sepsis, medicine toxicity, IV contrast dye used for cardiac catheterization or CT scans)   Blockage to the tubes (ureters) that drain the urine from the kidneys (e g , enlarged prostate, kidney stones, blood clots)    What is the treatment for JANEL? The treatment for JANEL depends on correcting what caused it  Treatment usually involves removing the cause and measures to prevent further injury to the kidneys  This may require the use of intravenous fluids or medications and/or temporary dialysis  Dialysis is a process using a machine that does the job of the kidneys to remove waste and help correct the electrolyte and fluid balance while the kidneys are recovering  If dialysis is needed to treat JANEL, the doctor will assess daily to see if the kidneys are showing signs of recovery  The daily assessments determine how long dialysis needs to continue  Depending on the cause and the extent of damage, an episode of JANEL may resolve in a few days to several weeks to several months  What are the long term effects of having an episode of JANEL?  People who have one episode of JANEL are at an increased risk of having another episode of JANEL as well as other health problems such as kidney disease, stroke, and heart disease   In a small number of people who had unrecognized kidney disease, an JANEL episode may result in Chronic Kidney Disease (CKD) which requires lifelong monitoring and treatment  How do you prevent future episodes of JANEL?  Make sure to follow up with the kidney doctor after hospital discharge and obtain blood work to reassess and monitor kidney function     If you have diabetes, keep your blood sugar in goal range and keep appointments with your diabetes specialist    Xavi Hassan If you have high blood pressure, have your blood pressure checked regularly to make sure it is in target range  o If you take blood pressure medicine called ACEIs or ARBs (e g , Lisinopril, Enalapril, Diovan, Losartan), your doctor may tell you to skip a dose or two if you have severe dehydration and your blood pressure is running low    Avoid using medicines such as NSAIDs (Nonsteroidal Anti-inflammatory Drugs) and May-2 Inhibitors (a type of NSAID) that may be harmful to kidney function  These may include the medicines listed in the table that follows  Examples of NSAIDS and May-2 Inhibitors   Talk to your doctor or healthcare provider before stopping any medicine ordered for you  Celecoxib (CELEBREX) Ketoprofen (ORUDIS, ORUVAIL)   Diclofenac (VOLTAREN, CATAFLAM) Ketorolac (TORADOL)   Diflunisal (DOLOBID) Meloxicam (MOBIC)   Etodolac (LODINE) Nabumetone (RELAFEN)   Fenoprofen (NALFON) Naproxen (ALEVE, NAPROSYN,    NAPRELAN, ANAPROX)   Flurbiprofen (ANSAID) Oxaprozin (DAYPRO)   Ibuprofen (MOTRIN, ADVIL) Piroxicam (FELDENE)   Indomethacin (INDOCIN) Sulindac (CLINORIL)    Tolmetin (Gustavo Dense)     Is there a special diet for people with JANEL? People with JANEL and/or other kidney disease often have high potassium and phosphorus levels in their blood  To protect the kidneys from further injury and to avoid complications, most doctors recommend following a healthy diet choosing foods low potassium and low phosphorus   Limiting dietary potassium to 2 5 grams/day and phosphorus to 800 milligrams/day is recommended   A dietitian can help you with learning more about this type of diet   The tables on the following page may help you to choose lower potassium and phosphorus foods  The following websites are also good sources of information:  [employer] at  org/nutrition/Kidney-Disease-Stages-1-4   [employer]   https://www niddk nih gov/health-information/kidney-disease/chronic-kidney-disease-ckd/eating-nutrition  https://[facility] org/health/articles/15641-renal-diet-basics  [employer] com cy    If you have any questions or concerns about your condition, please contact your doctor or healthcare provider    These tables may help you to choose lower potassium and phosphorus foods    AVOID these higher phosphorus* foods: CHOOSE these lower phosphorus* foods:   Milk, pudding , yogurt made from animals and from many soy varieties Rice milk (unfortified), nondairy creamer   Hard cheeses, ricotta, cottage cheese, fat-free cream cheese Regular and low-fat cream cheese   Ice cream, frozen yogurt Sherbet, frozen fruit pops, sorbet   Soups made with milk, dried peas, beans, lentils or other high phosphorus ingredients Soups made with broth, are water-based, or other lower phosphorus ingredients   Whole grains, including whole-grain breads, crackers, cereal, rice and pasta, corn tortillas Refined grains, including white bread, crackers, cereals, rice and pasta   Quick breads, biscuits, cornbread, muffins, pancakes, waffles, granola, wheat germ Homemade refined (white) dinner rolls, bagels, English muffins, sugar cookies, shortbread cookies, leo food cake   Dried peas (split, black-eyed), beans (black, garbanzo, lima, kidney, navy, gambino), lentils Green peas (canned, frozen), green beans, wax beans   Organ meats, walleye, [place], sardines Lean beef, pork, lamb, poultry, other fish   Nuts and seeds Popcorn   Peanut butter, other nut butters; tofu, veggie or soy burgers Jam, jelly, honey   Chocolate, including chocolate drinks Carob (chocolate-flavored) candy, hard candy,  gumdrops   Hay, pepper-type sodas, flavored campos, bottled teas, beer Lemon-lime soda, ginger ale or root beer, plain water, cream soda, grape soda   *Always read labels and avoid foods with ingredients containing "phos"  AVOID these higher potassium foods: CHOOSE these lower potassium foods:      Milk (fat free, low fat, whole, buttermilk, Soy), yogurt    Regular and low-fat cream cheese      Beans (white, [place]), [place] sprouts, spinach Swiss       chard, broccoli, avocado, artichoke, potatoes, sweet      potatoes, tomatoes/tomato sauce, beet greens      Green beans, alfalfa sprouts, bamboo shoots (canned),       cabbage, carrots, cauliflower, corn, cucumber, eggplant,      endive, lettuce, mushrooms, onions, radishes,  watercress,       water chestnuts (canned), rice, peas      Halibut, tuna, cod, snapper, tuna fish, turkey    Egg, lean beef, pork, lamb, shellfish, chicken       Banana, papaya, orange, cantaloupe, dates, raisins and      other dried fruit, pomegranate, avocado      Apple, applesauce, blackberries, raspberries, pears,     watermelon, cucumbers, blueberries, cranberries,       peaches      Almonds, peanuts, hazelnuts, [place], cashew, mixed,      seeds (sunflower, pumpkin)     Homemade refined (white) dinner rolls, bagels,      English muffins, flour tortilla, crackers, ankita      crackers, popcorn, pretzels, spaghetti or macaroni,       hummus      Tomato or vegetable juice, prune juice    Papaya, james, or pear nectar, cranberry juice cocktail      Molasses

## 2022-04-29 NOTE — CASE MANAGEMENT
Case Management Discharge Planning Note    Patient name Laly Delgado  Location /-08 MRN [id number]  : 1950 Date 2022       Current Admission Date: 2022  Current Admission Diagnosis:JANEL (acute kidney injury) [facility])   Patient Active Problem List    Diagnosis Date Noted    Urinary frequency 2022    Glomerulonephritis     Other proteinuria     Ambulatory dysfunction 2022    JANEL (acute kidney injury) ([facility] ) 2022    Great toe pain, right 04/10/2022    Surgical site infection 2022    Status post cervical spinal fusion 2022    Anemia 2022    Head pain cephalgia 2022    Hyponatremia 2022    Muscle spasm 2022    Goals of care, counseling/discussion 2022    Sinus bradycardia 03/10/2022    Cervical myelopathy ([facility] ) 2022    Weakness 2022    Pes anserinus bursitis of right knee 2021    IFG (impaired fasting glucose) 2021    History of DVT of lower extremity 2021    Mixed hyperlipidemia 10/27/2020    Paroxysmal A-fib ([facility] ) 2020    Lower extremity edema 2020    Primary osteoarthritis of left knee 2020    Primary osteoarthritis of right knee 2020    Depression, major, single episode, moderate (HCC) 10/30/2017    Insomnia 03/10/2017    Lumbar herniated disc 03/10/2017    Erectile dysfunction 2016    Status post catheter ablation of atrial flutter 2015    Essential hypertension 2015    WILL (obstructive sleep apnea) 2015    Factor V Leiden mutation ([facility] ) 2014      LOS (days): 7  Geometric Mean LOS (GMLOS) (days): 4 30  Days to GMLOS:-2 5     OBJECTIVE:  Risk of Unplanned Readmission Score: 35         Current admission status: Inpatient   Preferred Pharmacy:   [address]  [address] [facility], [address]  [address] [facility], [address]  [place] [address]  Phone: [phone] Fax: [phone]    [facility]/pharmacy #[id number]  [address] [address]  Phone: [phone] Fax: [phone]    Primary Care Provider: Esequiel Miramontes MD    Primary Insurance: [facility] REP  Secondary Insurance:     DISCHARGE DETAILS:    IMM Given (Date):: 04/29/22  IMM Given to[de-identified] Patient (left at bedside per Pt's request)     Additional Comments: Met with Pt  Pt plans to return home with MADHAVI  Pt reports he is aware he will be going home on PO ABX  Pt reports his wife wants an update on final plan for ID   CM notified Pt's nurse who will TT ELISE HICKS

## 2022-04-29 NOTE — DISCHARGE INSTR - AVS FIRST PAGE
Dear Kavita Magdaleno,     It was our pleasure to care for you here at [address]  It is our hope that we were always able to exceed the expected standards for your care during your stay  You were hospitalized due to acute kidney injury  You were cared for on the 3rd floor by Suzan Olson PA-C under the service of Gigi Restrepo MD with the [facility] who covers for your primary care physician (PCP), Agustin Kidd MD, while you were hospitalized  If you have any questions or concerns related to this hospitalization, you may contact us at [phone]  For follow up as well as any medication refills, we recommend that you follow up with your primary care physician  A registered nurse will reach out to you by phone within a few days after your discharge to answer any additional questions that you may have after going home    However, at this time we provide for you here, the most important instructions / recommendations at discharge:     Notable Medication Adjustments -   Take doxycycline twice daily until you have your follow-up appointment with [facility], you can discuss further antibiotics at that time  Testing Required after Discharge -   Weekly blood work to monitor your kidney starting on 05/02, every Monday  Important follow up information -   Follow-up with your primary care provider within 1 week  Follow-up with Nephrology closely outpatient  Follow-up with your surgeon and Infectious Disease  Once you are feeling better and cleared by her other specialists, you may follow-up with Urology to discuss incontinence  Other Instructions -   Return to hospital if you weakness or symptoms worsen  Please review this entire after visit summary as additional general instructions including medication list, appointments, activity, diet, any pertinent wound care, and other additional recommendations from your care team that may be provided for you       Sincerely,     Praveena Burr PA-C

## 2022-04-29 NOTE — DISCHARGE SUMMARY
[facility]  Discharge- Yasmine Hughes 1950, 71 y o  male MRN: [id number]  Unit/Bed#: -01 Encounter: [id number]  Primary Care Provider: Neil Washington MD   Date and time admitted to hospital: 4/22/2022  2:41 PM    * JANEL (acute kidney injury) [facility])  Assessment & Plan  · Patient presents volume overload, found to have acute anemia, hypoalbuminemia, proteinuria and JANEL with creatinine    · Patient has been receiving vancomycin IV as outpatient for surgical site infection  · Urinary retention protocol  · IV Lasix discontinued  · Echo performed 4/25, reveals mild biatrial dilation, mild mitral and tricuspid regurgitation, EF 99%, grade 2 diastolic dysfunction  · Continue albumin  · Nephrology following  · Infectious Disease recommending continuing doxycycline 100 mg b i d  Until patient follows up with [facility] postoperatively  · Avoid NSAIDs, hypotension, nephrotoxic agents  · Await serology studies  · C3, C4 normal  · Anti-DNA antibody double stranded: negative  · Anti-neutrophilic cytoplasmic antibody:  Pending  · Glomerular basement membrane antibody:  Pending  · Antinuclear antibodies IFA:  Negative  · SPEP:  Pending  · UPEP:  Pending  · Creatinine has stabilized at 3 8, we will need repeat BMP Monday 5/2, repeat weekly and have close outpatient follow-up with Nephrology    Surgical site infection  Assessment & Plan  · Pt is 6 weeks status post cervical fusion complicated by MRSA infection   · Was to complete 28 days of vanco IV and then 28 days of oral   · Received day #24/28 of IV vancomycin, transitioned to Daptomycin 4/24 by ID  · Continue on doxycycline 100 mg b i d   At discharge until patient follows up with [facility]  · Continue while inpatient  · Pharmacy consult for management    Urinary frequency  Assessment & Plan  · Urology referral to be performed outpatient once acute issues resolve    Glomerulonephritis  Assessment & Plan  · Post-infectious suspected  · Nephrology following  · Considering biopsy however patient would need to be on a heparin drip prior to this as he is on warfarin for factor V Leiden  · And BMP weekly outpatient  · Creatinine stabilized at 3 8 prior to discharge, cleared for discharge by Nephrology with close outpatient follow-up    Ambulatory dysfunction  Assessment & Plan  · Patient presents with generalized weakness, difficulty with ADLs  · Suspect secondary to volume overload/anasarca, deconditioning from recent hospitalization and anemia  · Improved slightly with transfusion  · PT/OT consult- appropriate for return home with home health care    Great toe pain, right  Assessment & Plan  · Was negative for gout during prior hospitalization  · Has been maintained on antibiotics  · Monitor for signs of infection or swelling and consider orthopedic consult if this worsens    Anemia  Assessment & Plan  · New normocytic anemia  Previously around 13 prior to surgery in March  May be due to acute illness and renal failure    · CBC showing: Hgb 6 6/Hct 20 9/MCV 95-normal  · Received 1 unit PRBCs 4/22 for hemoglobin of 6 6  · Transfuse if Hgb <7  · B12 and folate low normal  · Iron panel- iron saturation 20%, TIBC 204, iron 41  · FOBT negative    Mixed hyperlipidemia  Assessment & Plan  · Continue statin    Paroxysmal A-fib (HCC)  Assessment & Plan  · Continue metoprolol 100 mg and flecainide  · Held warfarin due to anemia requiring transfusion  · Resumed warfarin 4/25  · Continue to monitor INR outpatient    Depression, major, single episode, moderate (HCC)  Assessment & Plan  · Continue Cymbalta    Factor V Leiden mutation [facility])  Assessment & Plan  · Holding home warfarin in setting of anemia requiring trasnfusion  · At home on coumadin 5 mg on Wednesday and Saturday, 2 5 mg all other days  · Continue mechanical VTE prophylaxis  · Resume coumadin 4/25 as FOBT negative  · Can not be placed on Aranesp   · Hb stable    WILL (obstructive sleep apnea)  Assessment & Plan  · CPAP at bedtime    Essential hypertension  Assessment & Plan  · Currently on amlodipine 5 mg and metoprolol 100 mg daily - changed hold parameters  · Continue Albumin  · IV Lasix 40 mg b i d  dc'd   · Did receive 1 time dose Lasix 4/25    Medical Problems             Resolved Problems  Date Reviewed: 4/29/2022    None              Discharging Physician / Practitioner: Ricki Gamboa PA-C  PCP: Dung George MD  Admission Date:   Admission Orders (From admission, onward)     Ordered        04/22/22 1638  Inpatient Admission  Once                      Discharge Date: 04/29/22    Consultations During Hospital Stay:  · Infectious Disease  · Nephrology    Procedures Performed:   · Echocardiogram 4/25:    Left Ventricle: Left ventricular cavity size is mildly dilated  Wall thickness is mildly increased  The left ventricular ejection fraction is 55%  Systolic function is normal  Wall motion is normal  Diastolic function is moderately abnormal, consistent with grade II (pseudonormal) relaxation    Right Ventricle: Right ventricular cavity size is mildly dilated    Left Atrium: The atrium is mildly dilated    Right Atrium: The atrium is mildly dilated    Aortic Valve: The aortic valve is trileaflet  The leaflets are not thickened  The leaflets are moderately calcified  There is mildly reduced mobility  There is mild stenosis  The aortic valve velocity is increased due to stenosis    Mitral Valve: There is mild regurgitation    Tricuspid Valve: There is mild regurgitation  Significant Findings / Test Results:   · CXR 4/22:  No acute cardiopulmonary disease  · Ultrasound kidney bladder 4/28:   No hydronephrosis  · MRSA culture 4/22:  Negative  · FOBT 4/22:  Negative  · Peak creatinine:  3 83    Incidental Findings:   · None    Test Results Pending at Discharge (will require follow up):   · SPEP  · UPEP  · Glomerular basement membrane antibodies  · Anti neutrophilic cytoplasmic antibody     Outpatient Tests Requested:  · BMP 5/2 and then weekly on Mondays    Complications:  None    Reason for Admission:  Anemia PT/OT evaluation    Hospital Course:   Delmi Romero is a 71 y o  male patient with past medical history of hypertension, chronic pain, atrial fibrillation, factor 5 Leiden, hyperlipidemia, anemia who originally presented to the hospital on 4/22/2022 due to generalized weakness  Patient is 6 weeks post cervical fusion complicated by MRSA infection receiving IV vancomycin at home  Patient had recently been hospitalized at [address] [place] [address] 3/31 to 4/15 due to surgical site infection  Original surgery 4/1, discharge 04/15 for 4 weeks of IV vancomycin via PICC line followed by 4 weeks of p o  Antibiotics  Patient complained of fatigue, was found to have hemoglobin 6 7 on admission with a baseline of 7  He also had bilateral lower and upper extremity edema  He did receive blood transfusion, was then found to have JANEL with creatinine trending upwards throughout his admission  Nephrology followed closely  Suspect glomerulonephritis, post infectious  Infectious Disease was consulted to manage antibiotics and discussed with patient's outpatient ID provider who is located at [place], Dr Rell Givens  Recommending patient continue on doxycycline 100 mg b i d  Until he is able to follow-up with [place]  Nephrology cleared patient for discharge with close outpatient follow-up, he will require time to recover  Creatinine peaked at 3 83 and stabilized at day of discharge  He may continue close outpatient follow-up and repeat labs every Monday starting 5/2 to ensure stability and improvement  Hemodynamically stable at time of discharge and appropriate for return home per PT/OT recommendations with home healthcare  Please see above list of diagnoses and related plan for additional information       Condition at Discharge: stable    Discharge Day Visit / Exam:   Subjective: Patient reports he feels better today, he is hopeful that he can return home  He wants to go back to be with his wife who has I LD  Vitals: Blood Pressure: 152/78 (04/29/22 0705)  Pulse: 61 (04/29/22 0705)  Temperature: 97 9 °F (36 6 °C) (04/29/22 0705)  Temp Source: Oral (04/28/22 2124)  Respirations: 18 (04/29/22 0705)  Height: 5' 11" (180 3 cm) (04/25/22 0815)  Weight - Scale: 113 kg (250 lb 1 6 oz) (04/29/22 0500)  SpO2: 94 % (04/29/22 0705)  Exam:   Physical Exam  Vitals and nursing note reviewed  Constitutional:       General: He is not in acute distress  Appearance: Normal appearance  He is well-developed  HENT:      Head: Normocephalic and atraumatic  Eyes:      General: No scleral icterus  Conjunctiva/sclera: Conjunctivae normal    Cardiovascular:      Rate and Rhythm: Normal rate and regular rhythm  Heart sounds: Murmur heard  Pulmonary:      Effort: Pulmonary effort is normal       Breath sounds: No wheezing, rhonchi or rales  Abdominal:      General: There is no distension  Palpations: Abdomen is soft  Skin:     General: Skin is warm and dry  Neurological:      General: No focal deficit present  Mental Status: He is alert  Psychiatric:         Mood and Affect: Mood normal        Discussion with Family: Attempted to update  (wife) via phone  Unable to contact  Discharge instructions/Information to patient and family:   See after visit summary for information provided to patient and family  Provisions for Follow-Up Care:  See after visit summary for information related to follow-up care and any pertinent home health orders  Disposition:   Home with [facility] Services (Reminder: Complete face to face encounter)    Planned Readmission: None     Discharge Statement:  I spent 65 minutes discharging the patient  This time was spent on the day of discharge  I had direct contact with the patient on the day of discharge   Greater than 50% of the total time was spent examining patient, answering all patient questions, arranging and discussing plan of care with patient as well as directly providing post-discharge instructions  Additional time then spent on discharge activities  Discharge Medications:  See after visit summary for reconciled discharge medications provided to patient and/or family        **Please Note: This note may have been constructed using a voice recognition system**

## 2022-04-29 NOTE — PLAN OF CARE
Problem: Potential for Falls  Goal: Patient will remain free of falls  Description: INTERVENTIONS:  - Educate patient/family on patient safety including physical limitations  - Instruct patient to call for assistance with activity   - Consult OT/PT to assist with strengthening/mobility   - Keep Call bell within reach  - Keep bed low and locked with side rails adjusted as appropriate  - Keep care items and personal belongings within reach  - Initiate and maintain comfort rounds  - Make Fall Risk Sign visible to staff  - Offer Toileting every 2 Hours, in advance of need  - Initiate/Maintain bed alarm  - Obtain necessary fall risk management equipment: non slip socks  - Apply yellow socks and bracelet for high fall risk patients  - Consider moving patient to room near nurses station  Outcome: Progressing     Problem: METABOLIC, FLUID AND ELECTROLYTES - ADULT  Goal: Electrolytes maintained within normal limits  Description: INTERVENTIONS:  - Monitor labs and assess patient for signs and symptoms of electrolyte imbalances  - Administer electrolyte replacement as ordered  - Monitor response to electrolyte replacements, including repeat lab results as appropriate  - Instruct patient on fluid and nutrition as appropriate  Outcome: Progressing     Problem: SKIN/TISSUE INTEGRITY - ADULT  Goal: Incision(s), wounds(s) or drain site(s) healing without S/S of infection  Description: INTERVENTIONS  - Assess and document dressing, incision, wound bed, drain sites and surrounding tissue  - Provide patient and family education  - Perform skin care/dressing changes every day  Outcome: Progressing     Problem: INFECTION - ADULT  Goal: Absence or prevention of progression during hospitalization  Description: INTERVENTIONS:  - Assess and monitor for signs and symptoms of infection  - Monitor lab/diagnostic results  - Monitor all insertion sites, i e  indwelling lines, tubes, and drains  - Monitor endotracheal if appropriate and nasal secretions for changes in amount and color  - [place] appropriate cooling/warming therapies per order  - Administer medications as ordered  - Instruct and encourage patient and family to use good hand hygiene technique  - Identify and instruct in appropriate isolation precautions for identified infection/condition  Outcome: Progressing  Goal: Absence of fever/infection during neutropenic period  Description: INTERVENTIONS:  - Monitor WBC    Outcome: Progressing     Problem: MOBILITY - ADULT  Goal: Maintain or return to baseline ADL function  Description: INTERVENTIONS:  -  Assess patient's ability to carry out ADLs; assess patient's baseline for ADL function and identify physical deficits which impact ability to perform ADLs (bathing, care of mouth/teeth, toileting, grooming, dressing, etc )  - Assess/evaluate cause of self-care deficits   - Assess range of motion  - Assess patient's mobility; develop plan if impaired  - Assess patient's need for assistive devices and provide as appropriate  - Encourage maximum independence but intervene and supervise when necessary  - Involve family in performance of ADLs  - Assess for home care needs following discharge   - Consider OT consult to assist with ADL evaluation and planning for discharge  - Provide patient education as appropriate  Outcome: Progressing

## 2022-05-02 ENCOUNTER — PATIENT OUTREACH (OUTPATIENT)
Dept: FAMILY MEDICINE CLINIC | Facility: CLINIC | Age: 72
End: 2022-05-02

## 2022-05-02 ENCOUNTER — CLINICAL SUPPORT (OUTPATIENT)
Dept: NEUROSURGERY | Facility: CLINIC | Age: 72
End: 2022-05-02

## 2022-05-02 ENCOUNTER — TELEPHONE (OUTPATIENT)
Dept: NEPHROLOGY | Facility: CLINIC | Age: 72
End: 2022-05-02

## 2022-05-02 ENCOUNTER — TRANSITIONAL CARE MANAGEMENT (OUTPATIENT)
Dept: FAMILY MEDICINE CLINIC | Facility: CLINIC | Age: 72
End: 2022-05-02

## 2022-05-02 ENCOUNTER — TELEPHONE (OUTPATIENT)
Dept: FAMILY MEDICINE CLINIC | Facility: CLINIC | Age: 72
End: 2022-05-02

## 2022-05-02 VITALS — DIASTOLIC BLOOD PRESSURE: 80 MMHG | TEMPERATURE: 97.7 F | SYSTOLIC BLOOD PRESSURE: 140 MMHG

## 2022-05-02 DIAGNOSIS — Z71.89 COMPLEX CARE COORDINATION: Primary | ICD-10-CM

## 2022-05-02 DIAGNOSIS — Z98.1 S/P CERVICAL SPINAL FUSION: ICD-10-CM

## 2022-05-02 DIAGNOSIS — T81.41XA INFECTION OF SUPERFICIAL INCISIONAL SURGICAL SITE AFTER PROCEDURE, INITIAL ENCOUNTER: Primary | ICD-10-CM

## 2022-05-02 DIAGNOSIS — Z98.890 POST-OPERATIVE STATE: ICD-10-CM

## 2022-05-02 DIAGNOSIS — N17.9 AKI (ACUTE KIDNEY INJURY) (HCC): Primary | ICD-10-CM

## 2022-05-02 PROBLEM — R31.29 MICROSCOPIC HEMATURIA: Status: ACTIVE | Noted: 2022-05-02

## 2022-05-02 PROCEDURE — 99024 POSTOP FOLLOW-UP VISIT: CPT

## 2022-05-02 NOTE — TELEPHONE ENCOUNTER
----- Message from Rufina Chamberlain sent at 4/29/2022  9:06 AM EDT -----  Please arrange JANEL hospital follow up  Will need weekly BMP x 4

## 2022-05-02 NOTE — PATIENT INSTRUCTIONS
1  Wash incision gently in the shower  Avoid submerging in tub, hot tub, or pool  2  Leave incision open to air when ever possible, may cover loosely with gauze and paper tape for comfort  Do not seal incision under bandages  3  Do not apply any creams, ointments, or lotions directly to incision  4  Maintain activity restrictions of lifting, pushing, pulling no more than 5-10 pounds  5  Ambulate as tolerated  6  Return for follow-up visit on 5/13/2022 2pm, and then 5/24/2022 1pm   7  Complete any necessary imaging 2-3 days prior to upcoming appointment  X-Ray can be completed as a walk in and does not require and follow up  8  Contact the office with any questions or concerns

## 2022-05-02 NOTE — TELEPHONE ENCOUNTER
----- Message from Stan Forbes MD sent at 5/2/2022  1:53 PM EDT -----  Please call patient ask him where he had lab work done it looks like he does at [facility] get the [facility] result for me if he had it done please and task it to me thank you

## 2022-05-02 NOTE — PROGRESS NOTES
Outpatient Care Management Note:    Care manager called Elisabet Wilson and his wife, Jerome Tony, answered the phone  Jerome Tony is listed on Tian's communication form  Jerome Tony states that she has been managing all of Tian's needs  Today, he saw neurosurgery, and they removed his staples/sutures  Jeromealexa Tony has been doing Tian's wound care  She knows to monitor the incision for any increased redness, swelling, drainage or fever symptoms  Elisabet Wilson has a cervical collar which he is wearing at all times  Jerome Necessary changes and washes the pads as directed  Elisabet Wilson also got his lab work completed today at [facility]  Jerome Necessary notes that Elisabet Wilson needs ongoing orders for CBC and INR  The BMP is already ordered to be drawn every Monday  Elisabet Wilson is being seen by [facility]  Jerome Necessary is hoping [facility] can draw the ongoing lab work  She just needs it clarified if it will be covered as his insurance states labs need to be completed at [facility]  Jerome Tony has scheduled all follow up appts except urology  She will call urology to schedule  She notes that Elisabet Wilson has been having ongoing issues with urine incontinence  CM recommended he wear depends to prevent soiling clothing  Med review completed  Elisabet Wilson does not have potassium at home  He needs a new prescription  CM can see that fatemeh already called Dr Juan C Atkinson regarding the issue  CM gave Jerome Tony my contact information  She knows to call Dr Dean Finn office directly for any immediate questions or concerns  CM attempted to call [facility] but got the answering service  CM will call back tomorrow to clarify about ongoing blood draws and insurance coverage

## 2022-05-02 NOTE — PROGRESS NOTES
Post-Op Visit- Neurosurgery    Frandy Hickey 71 y o  male MRN: [id number]    Chief Complaint:  Patient presents post: Posterior cervical evacuation of postoperative collection and debridement with placement of drains C3-T1    History of Present Illness:  Patient presents for 2 week POV for incision check  Arrived accompanied by his wife and ambulated well with rolling walker  Breaks [employer] collar in place and being worn appropriately  Reports that he does not have significant pain throughout the day and reaches about a 5/10 occasionally  He has been wearing his cervical collar at all times, and his wife has been changing his pads QOD and washing incision daily         Current Outpatient Medications:     acetaminophen (TYLENOL) 325 mg tablet, Take 3 tablets (975 mg total) by mouth 3 (three) times a day as needed for mild pain, Disp: , Rfl: 0    amLODIPine (NORVASC) 5 mg tablet, Take 1 tablet (5 mg total) by mouth daily, Disp: 30 tablet, Rfl: 0    docusate sodium (COLACE) 100 mg capsule, Take 1 capsule (100 mg total) by mouth 2 (two) times a day (Patient taking differently: Take 100 mg by mouth in the morning  ), Disp: 60 capsule, Rfl: 0    doxycycline (ADOXA) 100 MG tablet, Take 1 tablet (100 mg total) by mouth 2 (two) times a day Take until you follow up postoperatively at [facility], Disp: 60 tablet, Rfl: 0    DULoxetine (CYMBALTA) 60 mg delayed release capsule, TAKE 1 CAPSULE BY MOUTH  DAILY, Disp: 90 capsule, Rfl: 1    flecainide (TAMBOCOR) 100 mg tablet, TAKE 1 TABLET BY MOUTH  TWICE DAILY, Disp: 180 tablet, Rfl: 1    gabapentin (NEURONTIN) 100 mg capsule, Take 1 capsule (100 mg total) by mouth in the morning, Disp: 30 capsule, Rfl: 0    gabapentin (NEURONTIN) 300 mg capsule, Take 1 capsule (300 mg total) by mouth daily at bedtime, Disp: 30 capsule, Rfl: 0    methocarbamol (ROBAXIN) 750 mg tablet, Take 1 tablet (750 mg total) by mouth every 6 (six) hours as needed for muscle spasms, Disp: 30 tablet, Rfl: 0    metoprolol succinate (TOPROL-XL) 100 mg 24 hr tablet, TAKE 1 TABLET BY MOUTH  DAILY, Disp: 90 tablet, Rfl: 0    pantoprazole (PROTONIX) 40 mg tablet, Take 1 tablet (40 mg total) by mouth 2 (two) times a day before meals, Disp: 30 tablet, Rfl: 0    polyethylene glycol (MIRALAX) 17 g packet, Take 17 g by mouth daily as needed (constipation), Disp: 30 each, Rfl: 0    pravastatin (PRAVACHOL) 40 mg tablet, TAKE 1 TABLET BY MOUTH  DAILY, Disp: 90 tablet, Rfl: 1    tamsulosin (FLOMAX) 0 4 mg, TAKE 1 CAPSULE BY MOUTH  DAILY WITH DINNER, Disp: 90 capsule, Rfl: 1    warfarin (COUMADIN) 5 mg tablet, Take by mouth daily Take 2 5 mg (1/2 tablets) daily except Wednesday and Saturday take 5 mg daily  , Disp: , Rfl:     potassium chloride (K-DUR,KLOR-CON) 20 mEq tablet, Take 2 tablets (40 mEq total) by mouth daily (Patient not taking: Reported on 5/2/2022 ), Disp: 14 tablet, Rfl: 0     Allergies   Allergen Reactions    Morphine GI Intolerance    Vitamin K Other (See Comments)     On coumadin-patient sensitive to vitamin K amounts in meds etc           Assessment:    Vitals:    05/02/22 1337   BP: 140/80   Temp: 97 7 °F (36 5 °C)       Wound Exam: Incision is clean, dry, and in tact, well approximated, without edema, or drainage  Small superficial openings observed at the entry points of each bolster  See below:         Procedure:  Staple/suture removal    Procedure Note: Cleansed surgical site with CHG swab prior to numerous staples and prolene suturing with bolsters were removed  Cleansed area with peroxide and covered loosely with ABD before replacement cervical collar  Patient Status: the patient tolerated the procedure well  Discussion/Summary  Doing well postoperatively   Reviewed incision care with patient including daily observation for s/s infection including: increased erythema, edema, drainage, dehiscence of incision or fever >101   Should these be observed, he understands that he is to call and/or return immediately for reassessment  Advised patient to continue cleansing area with mild soap and water and pat dry  Not to apply any lotions, creams, or ointments, & not to submerge in any water for 4  more weeks  He is to maintain activity restrictions and continue wearing cervical collar until cleared by the surgeon  Activity levels were also reviewed with the patient in detail, he is to lift no greater than 10 pounds and ambulation is encouraged as tolerated  Patient is aware that he cannot drive while still in the cervical collar  Verified date/time/location of upcoming POV and reminded him to complete x-rays prior to his next appointment  He is to call the office with any further questions or concerns, or if any incisional issues or fevers would arise

## 2022-05-02 NOTE — TELEPHONE ENCOUNTER
Left a voicemail requesting pt to c/b to schedule a hospital f/u  Pt is also to repeat lab work weekly for the next 4 weeks, lab scripts in epic

## 2022-05-02 NOTE — UTILIZATION REVIEW
Notification of Discharge   This is a Notification of Discharge from our facility [facility]  Please be advised that this patient has been discharge from our facility  Below you will find the admission and discharge date and time including the patients disposition  UTILIZATION REVIEW CONTACT:  Agustin Dietz  Utilization   Network Utilization Review Department  Phone: [phone] carefully listen to the prompts  All voicemails are confidential   Email: [email or web address]     PHYSICIAN ADVISORY SERVICES:  FOR [id number] REVIEW - MEDICAL NECESSITY DENIAL  Phone: [phone]  Fax: [phone]  Email: [email or web address]     PRESENTATION DATE: 4/22/2022  2:41 PM  OBERVATION ADMISSION DATE:   INPATIENT ADMISSION DATE: 4/22/22  4:38 PM   DISCHARGE DATE: 4/29/2022  6:00 PM  DISPOSITION: Home with [place] with [address] INFORMATION:  Send all requests for admission clinical reviews, approved or denied determinations and any other requests to dedicated fax number below belonging to the campus where the patient is receiving treatment   List of dedicated fax numbers:  [address] DENIALS (Administrative/Medical Necessity) [phone]   [address] (Maternity/NICU/Pediatrics) [phone]   [facility] [phone]   [address] [facility] [phone]   [address] [place] [address] [phone]   [address] [phone]   [facility]  [phone]   [address] [facility], [address]  [address] [facility] 1000 [address] [facility] [phone]

## 2022-05-02 NOTE — TELEPHONE ENCOUNTER
Pt call and pt just got out of hospital and they gave him   potassium chloride (K-DUR,KLOR-CON) 20 mEq tablet 40 mEq, Daily 0 ordered         Patient not taking   Reported on 5/2/2022        They said that they did not give him  script and want to know if he should still be on this medication and if so he needs refills on this medication       [facility]

## 2022-05-02 NOTE — TELEPHONE ENCOUNTER
Appointment Confirmation   Person confirmed appointment with  If not patient, name of the person Spouse   Rosa Score   Date and time of appointment 5/3  2:30    Patient acknowledged and will be at appointment? yes    Did you advise the patient that they will need a urine sample if they are a new patient?  N/A    Did you advise the patient to bring their current medications for verification? (including any OTC) Yes    Additional Information

## 2022-05-03 ENCOUNTER — TELEPHONE (OUTPATIENT)
Dept: FAMILY MEDICINE CLINIC | Facility: CLINIC | Age: 72
End: 2022-05-03

## 2022-05-03 ENCOUNTER — PATIENT OUTREACH (OUTPATIENT)
Dept: FAMILY MEDICINE CLINIC | Facility: CLINIC | Age: 72
End: 2022-05-03

## 2022-05-03 ENCOUNTER — OFFICE VISIT (OUTPATIENT)
Dept: NEPHROLOGY | Facility: HOSPITAL | Age: 72
End: 2022-05-03
Payer: COMMERCIAL

## 2022-05-03 ENCOUNTER — ANTICOAG VISIT (OUTPATIENT)
Dept: FAMILY MEDICINE CLINIC | Facility: CLINIC | Age: 72
End: 2022-05-03

## 2022-05-03 VITALS
WEIGHT: 251 LBS | HEIGHT: 71 IN | HEART RATE: 57 BPM | DIASTOLIC BLOOD PRESSURE: 74 MMHG | BODY MASS INDEX: 35.14 KG/M2 | SYSTOLIC BLOOD PRESSURE: 138 MMHG

## 2022-05-03 DIAGNOSIS — T81.49XA SURGICAL SITE INFECTION: ICD-10-CM

## 2022-05-03 DIAGNOSIS — R31.29 MICROSCOPIC HEMATURIA: ICD-10-CM

## 2022-05-03 DIAGNOSIS — N05.9 GLOMERULONEPHRITIS: ICD-10-CM

## 2022-05-03 DIAGNOSIS — R80.8 OTHER PROTEINURIA: ICD-10-CM

## 2022-05-03 DIAGNOSIS — D68.51 FACTOR V LEIDEN MUTATION (HCC): Primary | ICD-10-CM

## 2022-05-03 DIAGNOSIS — D68.51 FACTOR V LEIDEN MUTATION (HCC): ICD-10-CM

## 2022-05-03 DIAGNOSIS — D64.9 ANEMIA, UNSPECIFIED TYPE: ICD-10-CM

## 2022-05-03 DIAGNOSIS — I10 ESSENTIAL HYPERTENSION: Chronic | ICD-10-CM

## 2022-05-03 DIAGNOSIS — N17.9 AKI (ACUTE KIDNEY INJURY) (HCC): Primary | ICD-10-CM

## 2022-05-03 LAB
BASOPHILS # BLD AUTO: 0.1 X10E3/UL (ref 0–0.2)
BASOPHILS NFR BLD AUTO: 2 %
BUN SERPL-MCNC: 31 MG/DL (ref 8–27)
BUN/CREAT SERPL: 8 (ref 10–24)
CALCIUM SERPL-MCNC: 8.7 MG/DL (ref 8.6–10.2)
CHLORIDE SERPL-SCNC: 102 MMOL/L (ref 96–106)
CO2 SERPL-SCNC: 23 MMOL/L (ref 20–29)
CREAT SERPL-MCNC: 3.92 MG/DL (ref 0.76–1.27)
EGFR: 16 ML/MIN/1.73
EOSINOPHIL # BLD AUTO: 0.2 X10E3/UL (ref 0–0.4)
EOSINOPHIL NFR BLD AUTO: 3 %
ERYTHROCYTE [DISTWIDTH] IN BLOOD BY AUTOMATED COUNT: 13.8 % (ref 11.6–15.4)
GLUCOSE SERPL-MCNC: 114 MG/DL (ref 65–99)
HCT VFR BLD AUTO: 25.4 % (ref 37.5–51)
HGB BLD-MCNC: 8.4 G/DL (ref 13–17.7)
IMM GRANULOCYTES # BLD: 0 X10E3/UL (ref 0–0.1)
IMM GRANULOCYTES NFR BLD: 0 %
INR PPP: 4.5 (ref 0.9–1.2)
LYMPHOCYTES # BLD AUTO: 1.3 X10E3/UL (ref 0.7–3.1)
LYMPHOCYTES NFR BLD AUTO: 20 %
MCH RBC QN AUTO: 29.4 PG (ref 26.6–33)
MCHC RBC AUTO-ENTMCNC: 33.1 G/DL (ref 31.5–35.7)
MCV RBC AUTO: 89 FL (ref 79–97)
MONOCYTES # BLD AUTO: 0.5 X10E3/UL (ref 0.1–0.9)
MONOCYTES NFR BLD AUTO: 8 %
NEUTROPHILS # BLD AUTO: 4.2 X10E3/UL (ref 1.4–7)
NEUTROPHILS NFR BLD AUTO: 67 %
PLATELET # BLD AUTO: 244 X10E3/UL (ref 150–450)
POTASSIUM SERPL-SCNC: 3.6 MMOL/L (ref 3.5–5.2)
PROTHROMBIN TIME: 44.4 SEC (ref 9.1–12)
RBC # BLD AUTO: 2.86 X10E6/UL (ref 4.14–5.8)
SODIUM SERPL-SCNC: 141 MMOL/L (ref 134–144)
WBC # BLD AUTO: 6.4 X10E3/UL (ref 3.4–10.8)

## 2022-05-03 PROCEDURE — 99214 OFFICE O/P EST MOD 30 MIN: CPT | Performed by: PHYSICIAN ASSISTANT

## 2022-05-03 PROCEDURE — 3008F BODY MASS INDEX DOCD: CPT | Performed by: INTERNAL MEDICINE

## 2022-05-03 PROCEDURE — 1111F DSCHRG MED/CURRENT MED MERGE: CPT | Performed by: PHYSICIAN ASSISTANT

## 2022-05-03 NOTE — PATIENT INSTRUCTIONS
-most recent creatinine 3 92  -continue weekly BMP  Will also repeat BMP on 5/5/22  -continue doxycycline per Infectious Disease  Follow-up with Infectious Disease as directed  -Avoid all NSAIDs to include ibuprofen, Motrin, Aleve, Advil, Naproxen, Celebrex, Indomethacin, Toradol   -Stay well hydrated  -continue all prescribed medications   -Please let us know if you are scheduled for any studies with IV contrast (ex: CT scan, arteriogram or cardiac catheterization)   -Follow up in 4 weeks with Dr Shayy Sloan with repeat labs prior to appointment   -Please contact the office with new symptoms or concerns  Call the office immediately with persistent fatigue, edema, malaise, loss of appetite, confusion, shortness of breath

## 2022-05-03 NOTE — TELEPHONE ENCOUNTER
----- Message from Gricel Turk MD sent at 5/3/2022  9:13 AM EDT -----  Regarding: Warfarin/INR  INR elevated  Patient has decreased kidney function currently he should hold his Coumadin for 2 days repeat INR on Thursday  You may need to talk to his wife, Lor Perez  He has seen the nephrologist sometime today    Nephrology Will need view with his kidney function   ----- Message -----  From: [facility] Lab Results In  Sent: 5/3/2022   6:06 AM EDT  To: Gricel Turk MD

## 2022-05-03 NOTE — PROGRESS NOTES
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  -Admission creatinine 2 6 on 4/22/2022  -Peak creatinine during admission 3 85 on 4/29/2022  Creatinine 3 92 now  -Acid base and lytes stable   -currently, patient without uremic symptoms and stable  -workup: UA with 4+ protein, innumberable RBCs, 0-1 wbc's  -serologies:  Hep panel, C3/C4, anti double-stranded DNA, ANCA, ZAK and GBM ab normal, SPEP/UPEP w/no monoclonal bands   -Imaging:  Renal ultrasound with unremarkable echogenicity and contour with no hydronephrosis  -continue weekly BMP x 4 weeks  Will also obtain BMP later this week on 5/5/2022  -holding on renal biopsy for now  -Avoid nephrotoxins, NSAIDs, IV contrast if possible  -Avoid hypotension  Maintain MAP >65  -Adjust meds to appropriate GFR  -Optimize hemodynamic status to avoid delay in renal recovery  -d/w Dr Isabell Barton  -f/u in office with Dr Isabell Barton in 4 weeks with repeat blood and urine studies     -instructed patient and wife to contact the office with new symptoms, concerns, dyspnea, increasing malaise, fatigue, decreased urine output, increasing lower extremity edema, confusion, weakness      Nephrotic range proteinuria:   -suspected due to acute glomerulonephritis  - UPCr 18 87 on admission, repeat 16 5  -continue to monitor and repeat in steady state  -repeat UA with microscopy and UPCr prior to next appointment    Microscopic hematuria:  -suspected due to post infectious GN  -urology following  -repeat UA with micro in steady state  -outpatient f/u to be scheduled with urology    Hypertension/Volume status:  -BP stable and well controlled  -clinically, examines mildly hypervolemic with stable BLE edema  -Current medications:  Amlodipine 5 mg daily, Toprol- mg daily  -Optimize hemodynamic status to avoid delay in renal recovery   -Avoid hypotension or fluctuations in blood pressure    -continue to monitor BP at home    Electrolytes:  -K+ 3 6 and remains stable  -continue to monitor    Acid/base:  -current bicarb 23  -continue to monitor    MRSA surgical site infection, OM:  -cervical fusion 3/11/2022 c/b MRSA surgical site infection, osteomyelitis  -s/p debridement and washout 4/1/2022   -previously on vancomycin--> daptomycin course completed 4/29/2022  Remains on oral doxycycline  -continue antibiotic per ID  -incisional management per Neurosurgery    Acute on chronic anemia:  -Hgb 7 8 on discharge 4/29/22  Recent hemoglobin continues to improve at 8 4  -continue to monitor  -SPEP/UPEP with no monoclonal bands  -repeat CBC prior to next appointment  -recommend Transfuse for Hgb <2 5    Acute diastolic heart failure:   -EF 55% with grade 2 diastolic pseudonormal relaxation  IVC normal  -intermittent Lasix dosing during recent hospitalization   -clinically, examines mildly hypervolemic  -continue to monitor   - follow-up with cardiology    Factor V Leiden deficiency:   -hx of associated DVT  -on chronic coumadin for factor V Leiden and atrial fib  -recent INR supratherapeutic at 4 5  Holding Coumadin  Further management per PCP  -would require heparin bridging if consideration for future renal biopsy      Oli Clemente was seen today for follow-up and hypertension  Diagnoses and all orders for this visit:    JANEL (acute kidney injury) ([facility] [address] )  -     Basic metabolic panel; Future  -     CBC; Future  -     Urinalysis with microscopic; Future  -     Protein / creatinine ratio, urine; Future  -     Phosphorus; Future  -     Magnesium; Future  -     Basic metabolic panel; Future  -     Basic metabolic panel; Standing  -     Basic metabolic panel  -     CBC  -     Urinalysis with microscopic  -     Protein / creatinine ratio, urine  -     Phosphorus  -     Magnesium    Glomerulonephritis  -     Basic metabolic panel; Future  -     CBC; Future  -     Urinalysis with microscopic;  Future  -     Protein / creatinine ratio, urine; Future  -     Phosphorus; Future  -     Magnesium; Future  -     Basic metabolic panel; Future  -     Basic metabolic panel; Standing  -     Basic metabolic panel  -     CBC  -     Urinalysis with microscopic  -     Protein / creatinine ratio, urine  -     Phosphorus  -     Magnesium    Essential hypertension    Factor V Leiden mutation (HCC)    Surgical site infection    Other proteinuria    Microscopic hematuria    Anemia, unspecified type        Continue weekly BMP x 4 weeks  Follow up with Dr Yessenia Kim in 4 weeks with repeat blood and urine studies  Please call the office with any questions or concerns  Reason for Visit: Follow-up and Hypertension    HPI: Opal Gonzalez is a 71 y o  male with HTN, HLD, WILL, PAF, factor 5 Leiden deficiency with hx DVT on coumadin, and recent cervical fusion 3/11/22 c/b MRSA SSI, s/p debridement/washout 4/1/22 and subsequent readmission to [address] [place] [address] 4/22/2022 with symptomatic anemia w/Hgb 6 6 and JANEL suspected due to postinfectious glomerulonephritis who presents for hospital follow up of JANEL  Baseline creatinine 0 6-0 9 with increasing creatinine as as outpatient while on vancomycin for MRSA SSI  Vancomycin changed to daptomycin during hospitalization with antibiotic course completed 4/29/2022  Patient transitioned to doxycycline on hospital discharge to be continued until re-evaluated at [facility]  Creatinine 2 6 on readmission 4/22/22 with peak creatinine 3 85 on day of hospital discharge 4/29/22  Workup, including serologies and SPEP/UPEP negative  Repeat BMP 5/2/2022 with creatinine 3 92 in stable electrolytes and acid base  INR elevated at 4 5  Patient denies malaise, confusion, weakness, loss of appetite, decreased urine output, gross hematuria, foamy urine, nausea, vomiting, headache, abdominal pain, dyspnea    Lower extremity edema stable and unchanged since hospital discharge    ROS: A complete review of systems was performed and was negative unless otherwise noted in the history of present illness      Allergies:   Morphine and Vitamin k    Medications:     Current Outpatient Medications:     acetaminophen (TYLENOL) 325 mg tablet, Take 3 tablets (975 mg total) by mouth 3 (three) times a day as needed for mild pain, Disp: , Rfl: 0    amLODIPine (NORVASC) 5 mg tablet, Take 1 tablet (5 mg total) by mouth daily, Disp: 30 tablet, Rfl: 0    docusate sodium (COLACE) 100 mg capsule, Take 1 capsule (100 mg total) by mouth 2 (two) times a day (Patient taking differently: Take 100 mg by mouth in the morning  ), Disp: 60 capsule, Rfl: 0    doxycycline (ADOXA) 100 MG tablet, Take 1 tablet (100 mg total) by mouth 2 (two) times a day Take until you follow up postoperatively at [facility], Disp: 60 tablet, Rfl: 0    DULoxetine (CYMBALTA) 60 mg delayed release capsule, TAKE 1 CAPSULE BY MOUTH  DAILY, Disp: 90 capsule, Rfl: 1    flecainide (TAMBOCOR) 100 mg tablet, TAKE 1 TABLET BY MOUTH  TWICE DAILY, Disp: 180 tablet, Rfl: 1    gabapentin (NEURONTIN) 100 mg capsule, Take 1 capsule (100 mg total) by mouth in the morning, Disp: 30 capsule, Rfl: 0    gabapentin (NEURONTIN) 300 mg capsule, Take 1 capsule (300 mg total) by mouth daily at bedtime, Disp: 30 capsule, Rfl: 0    methocarbamol (ROBAXIN) 750 mg tablet, Take 1 tablet (750 mg total) by mouth every 6 (six) hours as needed for muscle spasms, Disp: 30 tablet, Rfl: 0    metoprolol succinate (TOPROL-XL) 100 mg 24 hr tablet, TAKE 1 TABLET BY MOUTH  DAILY, Disp: 90 tablet, Rfl: 0    pantoprazole (PROTONIX) 40 mg tablet, Take 1 tablet (40 mg total) by mouth 2 (two) times a day before meals, Disp: 30 tablet, Rfl: 0    polyethylene glycol (MIRALAX) 17 g packet, Take 17 g by mouth daily as needed (constipation), Disp: 30 each, Rfl: 0    pravastatin (PRAVACHOL) 40 mg tablet, TAKE 1 TABLET BY MOUTH  DAILY, Disp: 90 tablet, Rfl: 1    tamsulosin (FLOMAX) 0 4 mg, TAKE 1 CAPSULE BY MOUTH  DAILY WITH DINNER, Disp: 90 capsule, Rfl: 1    warfarin (COUMADIN) 5 mg tablet, Take by mouth daily Take 2 5 mg (1/2 tablets) daily except Wednesday and Saturday take 5 mg daily  , Disp: , Rfl:     potassium chloride (K-DUR,KLOR-CON) 20 mEq tablet, Take 2 tablets (40 mEq total) by mouth daily (Patient not taking: Reported on 5/3/2022 ), Disp: 14 tablet, Rfl: 0    Past Medical History:   Diagnosis Date    Acute venous embolism and thrombosis of deep vessels of proximal lower extremity (HCC)     Last assessed: 5/18/15    Arthritis     Cellulitis     LE    CPAP (continuous positive airway pressure) dependence     Factor V Leiden ([facility] [address] )     Forgetfulness     [place] (hard of hearing)     Paroxysmal atrial fibrillation ([facility] [address] )     Last assessed: 11/2/15    Sleep apnea      Past Surgical History:   Procedure Laterality Date    BACK SURGERY      Lower    COLON SURGERY      COLONOSCOPY      INCISION AND DRAINAGE POSTERIOR SPINE N/A 4/1/2022    Procedure: Posterior cervical evacuation of postoperative collection and debridement with placement of drains C3-T1; Surgeon: Camille Meyer MD;  [facility]: [facility];  Service: Neurosurgery    MI ARTHRODESIS ANT INTERBODY MIN DISCECTOMY, CERVICAL BELOW C2 N/A 3/11/2022    Procedure: Anterior cervical discectomy and fixation fusion C5/6 and C6/7; Posterior cervical decompression and instrumented fusion C3-T1; Surgeon: Camille Meyer MD;  [facility];  Service: Neurosurgery     Family History   Problem Relation Age of Onset    Lung cancer Mother     No Known Problems Father     Heart attack Brother     Atrial fibrillation Brother     Emphysema Sister       reports that he has never smoked  He has never used smokeless tobacco  He reports previous alcohol use  He reports that he does not use drugs      Physical Exam:   Vitals:    05/03/22 1438   BP: 138/74   BP Location: Left arm   Patient Position: Sitting   Cuff Size: Standard   Pulse: 57   Weight: 114 kg (251 lb)   Height: 5' 11" (1 803 m)     Body mass index is 35 01 kg/m²  General:  Awake, alert, appears comfortable and in no acute distress  Nontoxic  Skin:  No rash, warm, good skin turgor   Eyes:  PERRL, EOMI, sclerae nonicteric   no periorbital edema   ENT:  Moist mucous membranes  Neck:  Trachea midline, symmetric  No JVD  No carotid bruits  Cervical collar in place  Chest:  Clear to auscultation bilaterally without wheezes, crackles or rhonchi  CVS:  Regular rate and rhythm without murmur, gallop or rub  S1 and S2 identified and normal   No S3, S4    Abdomen:  Soft, nontender, nondistended without masses  Normal bowel sounds x 4 quadrants  No bruit  Extremities:  Warm, pink, motor and sensory intact and well perfused  No cyanosis, pallor  1+ BLE edema  Neuro:  Awake, alert, oriented x3  Grossly intact  Psych:  Appropriate affect  Mentating appropriately  Normal mental status exam      Procedure:  No results found for this or any previous visit      Lab Results   Component Value Date    GLUCOSE 102 (H) 10/25/2017    CALCIUM 8 4 04/29/2022     10/25/2017    K 3 6 05/02/2022    CO2 23 05/02/2022     05/02/2022    BUN 31 (H) 05/02/2022    CREATININE 3 92 (H) 05/02/2022       Results from last 7 days   Lab Units 05/02/22  1243 04/29/22  1042 04/29/22  0410 04/28/22  0535 04/27/22  0339   WBC Thousand/uL  --   --  6 07  --  6 20   WHITE BLOOD CELL COUNT  x10E3/uL 6 4  --   --   --   --    HEMOGLOBIN g/dL  --   --  7 8*  --  7 6*   HEMOGLOBIN  g/dL 8 4*  --   --   --   --    HEMATOCRIT %  --   --  25 0*  --  24 7*   HEMATOCRIT  % 25 4*  --   --   --   --    PLATELETS Thousands/uL  --   --  209  --  186   PLATELETS  J57Z5/  --   --   --   --    POTASSIUM mmol/L 3 6 3 5 3 4* 3 5 3 7   CHLORIDE mmol/L 102 103 104 104 105   CO2 mmol/L 23 26 28 27 27   BUN mg/dL 31* 32* 32* 32* 31*   CREATININE mg/dL 3 92* 3 85* 3 82* 3 83* 3 75*   CALCIUM mg/dL  --  8 4 8 2* 8 3 8 1*   MAGNESIUM mg/dL  --   --  1 8  --   -- I have personally reviewed the blood work as stated above and in my note  I have personally reviewed PCP and prior nephrology notes

## 2022-05-03 NOTE — TELEPHONE ENCOUNTER
Spoke to patient information bellow given   Patient will hold coumadin for 2 day a repeat on 05/06/2022  Please place order and call patient to let know order in    Patient has other labs to be completed on thursday

## 2022-05-03 NOTE — PROGRESS NOTES
Outpatient Care Management Note:    Care manager called [facility] and spoke with Kalie Sheehan  She states that they can draw blood work and will take specimen to [facility] if needed for coverage by the insurance  Voice mail message left for Tian's wife, Buddy Vega, with my contact information, updating her on above

## 2022-05-04 NOTE — PROGRESS NOTES
Outpatient Care Management Note:    Julianne Heath message received from Dr Jamie Trujillo: I did speak to patient's wife yesterday  Bg Zacarias saw Nephrology today   Potassium level is 3 6   Will hold off on potassium   We can address the need for standing lab orders at his office visit May 5, 2020 to peer

## 2022-05-04 NOTE — TELEPHONE ENCOUNTER
It looks like PM talked to the pt's wife and advised her that we can put orders in for labs at his ov on 5/5/22

## 2022-05-05 ENCOUNTER — ANTICOAG VISIT (OUTPATIENT)
Dept: FAMILY MEDICINE CLINIC | Facility: CLINIC | Age: 72
End: 2022-05-05

## 2022-05-05 ENCOUNTER — PATIENT OUTREACH (OUTPATIENT)
Dept: FAMILY MEDICINE CLINIC | Facility: CLINIC | Age: 72
End: 2022-05-05

## 2022-05-05 ENCOUNTER — OFFICE VISIT (OUTPATIENT)
Dept: FAMILY MEDICINE CLINIC | Facility: CLINIC | Age: 72
End: 2022-05-05
Payer: COMMERCIAL

## 2022-05-05 VITALS — HEART RATE: 64 BPM | DIASTOLIC BLOOD PRESSURE: 70 MMHG | SYSTOLIC BLOOD PRESSURE: 110 MMHG | OXYGEN SATURATION: 98 %

## 2022-05-05 DIAGNOSIS — N17.9 AKI (ACUTE KIDNEY INJURY) (HCC): ICD-10-CM

## 2022-05-05 DIAGNOSIS — Z98.1 STATUS POST CERVICAL SPINAL FUSION: ICD-10-CM

## 2022-05-05 DIAGNOSIS — T81.49XA SURGICAL SITE INFECTION: ICD-10-CM

## 2022-05-05 DIAGNOSIS — G95.9 CERVICAL MYELOPATHY (HCC): Primary | ICD-10-CM

## 2022-05-05 DIAGNOSIS — D68.51 FACTOR V LEIDEN MUTATION (HCC): ICD-10-CM

## 2022-05-05 DIAGNOSIS — I10 ESSENTIAL HYPERTENSION: Chronic | ICD-10-CM

## 2022-05-05 DIAGNOSIS — I48.0 PAROXYSMAL A-FIB (HCC): Chronic | ICD-10-CM

## 2022-05-05 LAB — INR PPP: 2.5 (ref 0.84–1.19)

## 2022-05-05 PROCEDURE — 1111F DSCHRG MED/CURRENT MED MERGE: CPT | Performed by: FAMILY MEDICINE

## 2022-05-05 PROCEDURE — 99495 TRANSJ CARE MGMT MOD F2F 14D: CPT | Performed by: FAMILY MEDICINE

## 2022-05-05 NOTE — ASSESSMENT & PLAN NOTE
Continue current medications  Will resume warfarin at 2 5 mg once daily  Recheck PT INR early next week

## 2022-05-05 NOTE — PATIENT INSTRUCTIONS
Continue current medications  I would restart warfarin 2 5 mg daily  Check it PT INR within next 4-5 days

## 2022-05-05 NOTE — PROGRESS NOTES
[id number]         NAME: Frandy Hickey is a 71 y o  male  : 1950    MRN: [id number]  DATE: May 5, 2022  TIME: 5:14 PM    Assessment and Plan   Cervical myelopathy ([facility] [address] ) [G95 9]  1  Cervical myelopathy ([facility] [address] )     2  JANEL (acute kidney injury) ([facility] [address] )     3  Factor V Leiden mutation (HCC)     4  Paroxysmal A-fib ([facility] [address] )     5  Essential hypertension     6  Surgical site infection     7  Status post cervical spinal fusion         Paroxysmal A-fib (HCC)  Continue current medications  Will resume warfarin at 2 5 mg once daily  Recheck PT INR early next week  Patient Instructions     Patient Instructions   Continue current medications  I would restart warfarin 2 5 mg daily  Check it PT INR within next 4-5 days  Chief Complaint     Chief Complaint   Patient presents with    Transition of Care Management         History of Present Illness       TCM Call (since 2022)     Date and time call was made  2022 10:54 AM    Hospital care reviewed  Records reviewed        Patient was hospitialized at  [address] [place] [address]        Date of Admission  22    Date of discharge  22    Diagnosis  JANEL (acute kidney injury)     Disposition  Home    Were the patients medications reviewed and updated  Yes    Current Symptoms  None      TCM Call (since 2022)     Post hospital issues  None    Should patient be enrolled in anticoag monitoring? Yes  already actively   enrolled    Scheduled for follow up?   Yes  2022    Did you obtain your prescribed medications  Yes    Do you need help managing your prescriptions or medications  No    Is transportation to your appointment needed  No    I have advised the patient to call PCP with any new or worsening symptoms    yong sosa ma    Living Arrangements  Family members    Are you recieving any outpatient services  No    Are you recieving home care services  Yes    Types of home care services  Nurse visit    Are you using any community resources  No      Patient here for TCM  He has had multiple hospitalizations since his last visit here  Hospital records are reviewed  Recent laboratory studies are reviewed  Medications reviewed reconciled  He is still following up with Infectious Disease, Nephrology, Urology, Neurology, and Neurosurgery  Generally he is feel somewhat better but still requires a walker  Is getting physical therapy  Review of Systems   Review of Systems   Constitutional: Positive for fatigue  Negative for appetite change, chills, diaphoresis and fever  HENT: Negative for ear pain, rhinorrhea, sinus pressure and sore throat  Eyes: Negative for discharge, redness and itching  Respiratory: Negative for cough, shortness of breath and wheezing  Cardiovascular: Negative for chest pain and palpitations  Rapid or slow heart rate   Gastrointestinal: Negative for abdominal pain, diarrhea, nausea and vomiting  Musculoskeletal: Positive for gait problem, neck pain and neck stiffness  Skin: Positive for wound  Psychiatric/Behavioral: Positive for dysphoric mood  The patient is not nervous/anxious            Current Medications       Current Outpatient Medications:     acetaminophen (TYLENOL) 325 mg tablet, Take 3 tablets (975 mg total) by mouth 3 (three) times a day as needed for mild pain, Disp: , Rfl: 0    amLODIPine (NORVASC) 5 mg tablet, Take 1 tablet (5 mg total) by mouth daily, Disp: 30 tablet, Rfl: 0    docusate sodium (COLACE) 100 mg capsule, Take 1 capsule (100 mg total) by mouth 2 (two) times a day (Patient taking differently: Take 100 mg by mouth in the morning  ), Disp: 60 capsule, Rfl: 0    doxycycline (ADOXA) 100 MG tablet, Take 1 tablet (100 mg total) by mouth 2 (two) times a day Take until you follow up postoperatively at [facility], Disp: 60 tablet, Rfl: 0    DULoxetine (CYMBALTA) 60 mg delayed release capsule, TAKE 1 CAPSULE BY MOUTH  DAILY, Disp: 90 capsule, Rfl: 1    flecainide (TAMBOCOR) 100 mg tablet, TAKE 1 TABLET BY MOUTH  TWICE DAILY, Disp: 180 tablet, Rfl: 1    gabapentin (NEURONTIN) 100 mg capsule, Take 1 capsule (100 mg total) by mouth in the morning, Disp: 30 capsule, Rfl: 0    gabapentin (NEURONTIN) 300 mg capsule, Take 1 capsule (300 mg total) by mouth daily at bedtime, Disp: 30 capsule, Rfl: 0    methocarbamol (ROBAXIN) 750 mg tablet, Take 1 tablet (750 mg total) by mouth every 6 (six) hours as needed for muscle spasms, Disp: 30 tablet, Rfl: 0    metoprolol succinate (TOPROL-XL) 100 mg 24 hr tablet, TAKE 1 TABLET BY MOUTH  DAILY, Disp: 90 tablet, Rfl: 0    pantoprazole (PROTONIX) 40 mg tablet, Take 1 tablet (40 mg total) by mouth 2 (two) times a day before meals, Disp: 30 tablet, Rfl: 0    polyethylene glycol (MIRALAX) 17 g packet, Take 17 g by mouth daily as needed (constipation), Disp: 30 each, Rfl: 0    pravastatin (PRAVACHOL) 40 mg tablet, TAKE 1 TABLET BY MOUTH  DAILY, Disp: 90 tablet, Rfl: 1    tamsulosin (FLOMAX) 0 4 mg, TAKE 1 CAPSULE BY MOUTH  DAILY WITH DINNER, Disp: 90 capsule, Rfl: 1    warfarin (COUMADIN) 5 mg tablet, Take by mouth daily Take 2 5 mg (1/2 tablets) daily except Wednesday and Saturday take 5 mg daily  , Disp: , Rfl:     Current Allergies     Allergies as of 05/05/2022 - Reviewed 05/05/2022   Allergen Reaction Noted    Morphine GI Intolerance 09/11/2018    Vitamin k Other (See Comments) 12/28/2020            The following portions of the patient's history were reviewed and updated as appropriate: allergies, current medications, past family history, past medical history, past social history, past surgical history and problem list      Past Medical History:   Diagnosis Date    Acute venous embolism and thrombosis of deep vessels of proximal lower extremity (HCC)     Last assessed: 5/18/15    Arthritis     Cellulitis     LE    Chronic kidney disease 3/2020    Acute Kidney Injury    CPAP (continuous positive airway pressure) dependence     Factor V Leiden ([facility] [address] )     Forgetfulness     Headache(784 0) 3/2022    Never till cervical  spine surgery    Heart murmur 3/2020    Told when in hospital   [facility] [place], operated by [facility] (hard of hearing)     Paroxysmal atrial fibrillation ([facility] [address] )     Last assessed: 11/2/15    Sleep apnea        Past Surgical History:   Procedure Laterality Date    BACK SURGERY      Lower    COLON SURGERY      COLONOSCOPY      INCISION AND DRAINAGE POSTERIOR SPINE N/A 4/1/2022    Procedure: Posterior cervical evacuation of postoperative collection and debridement with placement of drains C3-T1; Surgeon: Reyes Ok, MD;  [facility]: [facility];  Service: Neurosurgery    TN ARTHRODESIS ANT INTERBODY MIN DISCECTOMY, CERVICAL BELOW C2 N/A 3/11/2022    Procedure: Anterior cervical discectomy and fixation fusion C5/6 and C6/7; Posterior cervical decompression and instrumented fusion C3-T1; Surgeon: Reyes Ok, MD;  [facility]: [facility];  Service: Neurosurgery    SPINE SURGERY  3/11/2022    Cervical myelopathy/ cervical fusion       Family History   Problem Relation Age of Onset    Lung cancer Mother     Hearing loss Father     Heart attack Brother     Atrial fibrillation Brother     Emphysema Sister          Medications have been verified  Objective   /70 (BP Location: Left arm, Patient Position: Sitting, Cuff Size: Large)   Pulse 64   SpO2 98%        Physical Exam     Physical Exam  Constitutional:       General: He is not in acute distress  Appearance: Normal appearance  HENT:      Head: Normocephalic  Nose: Nose normal    Cardiovascular:      Rate and Rhythm: Normal rate and regular rhythm  Heart sounds: Normal heart sounds  Pulmonary:      Effort: Pulmonary effort is normal       Breath sounds: Normal breath sounds  Musculoskeletal:      Right lower leg: No edema  Left lower leg: No edema     Neurological:      Mental Status: He is alert and oriented to person, place, and time     Psychiatric:         Mood and Affect: Mood normal          Behavior: Behavior normal

## 2022-05-05 NOTE — PROGRESS NOTES
Outpatient Care Management Note:    Care manager called Leonel Flores and his wife answered the phone  They are on their way to an office appt with Dr Tera Rands Erling Moritz will call back  She has my contact information

## 2022-05-06 ENCOUNTER — PATIENT OUTREACH (OUTPATIENT)
Dept: FAMILY MEDICINE CLINIC | Facility: CLINIC | Age: 72
End: 2022-05-06

## 2022-05-06 ENCOUNTER — TELEPHONE (OUTPATIENT)
Dept: FAMILY MEDICINE CLINIC | Facility: CLINIC | Age: 72
End: 2022-05-06

## 2022-05-06 DIAGNOSIS — Z71.89 COMPLEX CARE COORDINATION: Primary | ICD-10-CM

## 2022-05-06 NOTE — TELEPHONE ENCOUNTER
Pt visiting home nurse call and want to know if you can put in ordered  for him for HOME PHYSICAL THERAPY through [facility] if you can put that in epic         cb [phone] angela ochoa

## 2022-05-06 NOTE — PROGRESS NOTES
Outpatient Care Management Note:    Voice mail message received from [address] wife, Alison Nguyen, requesting a call back  Care manager called [address] wife, Alison Nguyen, back  Alison Nguyen states that she is overwhelmed with everything  She has medical problems of her own and is not feeling well  Chiara Lares is being seen by [facility]  They will be drawing Tian's ongoing weekly blood work  He did have yesterday's blood work drawn but no results are back yet  We reviewed that Dr Jennifer Moyer discontinued Tian's potassium  We discussed that Alison Nguyen should monitor Chiara Lares for worsening signs of fatigue, decreased urine output, increased edema, confusion or weakness and call nephrology with any concerns  CM also saw that nephrology was recommending Chiara Lares follow up with cardiology per their office note  Alison Nguyen states that Chiara Lares does see Dr Zac Dixon twice a year  She will call to get a follow up appt  Alison Nguyen noted that Chiara Lares has been having ongoing urinary bleeding since and during his hospitalization  She states that all of the doctors are aware of the bleeding and states it is not any worse but also not better  She will contact urology with any questions  Alison Nguyen is still requesting physical therapy and I see that [facility] left Dr Jennifer Moyer a message regarding this  She states that he will need transportation once he is approved for outpatient therapy  CM will place a referral to our

## 2022-05-09 ENCOUNTER — TELEPHONE (OUTPATIENT)
Dept: FAMILY MEDICINE CLINIC | Facility: CLINIC | Age: 72
End: 2022-05-09

## 2022-05-09 ENCOUNTER — TELEPHONE (OUTPATIENT)
Dept: INFECTIOUS DISEASES | Facility: CLINIC | Age: 72
End: 2022-05-09

## 2022-05-09 ENCOUNTER — PATIENT OUTREACH (OUTPATIENT)
Dept: FAMILY MEDICINE CLINIC | Facility: HOSPITAL | Age: 72
End: 2022-05-09

## 2022-05-09 DIAGNOSIS — Z71.89 COMPLEX CARE COORDINATION: Primary | ICD-10-CM

## 2022-05-09 DIAGNOSIS — D68.51 FACTOR V LEIDEN MUTATION (HCC): Primary | ICD-10-CM

## 2022-05-09 DIAGNOSIS — Z98.1 STATUS POST CERVICAL SPINAL FUSION: ICD-10-CM

## 2022-05-09 DIAGNOSIS — I48.0 PAROXYSMAL A-FIB (HCC): ICD-10-CM

## 2022-05-09 DIAGNOSIS — N17.9 AKI (ACUTE KIDNEY INJURY) (HCC): ICD-10-CM

## 2022-05-09 DIAGNOSIS — G95.9 CERVICAL MYELOPATHY (HCC): ICD-10-CM

## 2022-05-09 DIAGNOSIS — Z79.01 ANTICOAGULATED ON COUMADIN: ICD-10-CM

## 2022-05-09 LAB — INR PPP: 4 (ref 0.84–1.19)

## 2022-05-09 NOTE — TELEPHONE ENCOUNTER
Pt wife called regarding appt tomorrow she wanted to state that she wasn't feeling well and unable to bring pt in for appt tomorrow  She also doesn't feel it is necessary as pt is doing better with the incision, it is completely closed now  Reached out to Dr Ángela Bowen who will call pt and wife to discuss   appt at this time can be held until Dr Ángela Bowen notifies office

## 2022-05-09 NOTE — TELEPHONE ENCOUNTER
spoke to wife information bellow given   Please place order,  visiting nurse coming on thursday to [address] please place order for PT INR  To be completed then

## 2022-05-09 NOTE — PROGRESS NOTES
spoke with Noa Damian and Bal Rashid today and they agreed to services for assistance with applying for [employer] transportation   Bal Rashid asked to schedule pt [facility] phone assessment for 5/11 @1pm, because pt has a virtual appointment today and not really feeling well, where pt may have to go back into the hospital     CM OC agreed to F/U with pt & spouse on 5/11 @1pm

## 2022-05-10 ENCOUNTER — TELEPHONE (OUTPATIENT)
Dept: FAMILY MEDICINE CLINIC | Facility: CLINIC | Age: 72
End: 2022-05-10

## 2022-05-10 ENCOUNTER — TELEMEDICINE (OUTPATIENT)
Dept: INFECTIOUS DISEASES | Facility: CLINIC | Age: 72
End: 2022-05-10
Payer: COMMERCIAL

## 2022-05-10 ENCOUNTER — PATIENT OUTREACH (OUTPATIENT)
Dept: FAMILY MEDICINE CLINIC | Facility: CLINIC | Age: 72
End: 2022-05-10

## 2022-05-10 ENCOUNTER — HOSPITAL ENCOUNTER (INPATIENT)
Facility: HOSPITAL | Age: 72
LOS: 25 days | Discharge: HOME HEALTH | DRG: 674 | End: 2022-06-04
Attending: INTERNAL MEDICINE | Admitting: INTERNAL MEDICINE
Payer: COMMERCIAL

## 2022-05-10 ENCOUNTER — ANTICOAG VISIT (OUTPATIENT)
Dept: FAMILY MEDICINE CLINIC | Facility: CLINIC | Age: 72
End: 2022-05-10

## 2022-05-10 DIAGNOSIS — N17.9 AKI (ACUTE KIDNEY INJURY) (HCC): Primary | ICD-10-CM

## 2022-05-10 DIAGNOSIS — M51.26 LUMBAR HERNIATED DISC: ICD-10-CM

## 2022-05-10 DIAGNOSIS — D68.51 FACTOR V LEIDEN MUTATION (HCC): ICD-10-CM

## 2022-05-10 DIAGNOSIS — R11.0 NAUSEA: Primary | ICD-10-CM

## 2022-05-10 DIAGNOSIS — R73.01 IFG (IMPAIRED FASTING GLUCOSE): ICD-10-CM

## 2022-05-10 DIAGNOSIS — T81.49XA SURGICAL SITE INFECTION: ICD-10-CM

## 2022-05-10 DIAGNOSIS — I10 ESSENTIAL HYPERTENSION: ICD-10-CM

## 2022-05-10 DIAGNOSIS — Z98.890 STATUS POST CATHETER ABLATION OF ATRIAL FLUTTER: ICD-10-CM

## 2022-05-10 DIAGNOSIS — I48.0 PAROXYSMAL A-FIB (HCC): Chronic | ICD-10-CM

## 2022-05-10 DIAGNOSIS — R31.0 GROSS HEMATURIA: ICD-10-CM

## 2022-05-10 DIAGNOSIS — Z79.52 CURRENT USE OF STEROID MEDICATION: ICD-10-CM

## 2022-05-10 DIAGNOSIS — Z98.1 STATUS POST CERVICAL SPINAL FUSION: ICD-10-CM

## 2022-05-10 DIAGNOSIS — I50.32 CHRONIC DIASTOLIC (CONGESTIVE) HEART FAILURE (HCC): ICD-10-CM

## 2022-05-10 DIAGNOSIS — A49.02 MRSA (METHICILLIN RESISTANT STAPHYLOCOCCUS AUREUS): Primary | ICD-10-CM

## 2022-05-10 DIAGNOSIS — N02.8 IGA NEPHROPATHY DETERMINED BY BIOPSY OF KIDNEY: ICD-10-CM

## 2022-05-10 PROBLEM — E87.6 HYPOKALEMIA: Status: ACTIVE | Noted: 2022-05-10

## 2022-05-10 LAB
ALBUMIN SERPL BCP-MCNC: 2.1 G/DL (ref 3.5–5)
ALP SERPL-CCNC: 94 U/L (ref 46–116)
ALT SERPL W P-5'-P-CCNC: 10 U/L (ref 12–78)
ANION GAP SERPL CALCULATED.3IONS-SCNC: 10 MMOL/L (ref 4–13)
AST SERPL W P-5'-P-CCNC: 14 U/L (ref 5–45)
BACTERIA UR QL AUTO: ABNORMAL /HPF
BASOPHILS # BLD AUTO: 0.04 THOUSANDS/ΜL (ref 0–0.1)
BASOPHILS NFR BLD AUTO: 1 % (ref 0–1)
BILIRUB SERPL-MCNC: 0.4 MG/DL (ref 0.2–1)
BILIRUB UR QL STRIP: NEGATIVE
BUN SERPL-MCNC: 35 MG/DL (ref 5–25)
CALCIUM ALBUM COR SERPL-MCNC: 9.4 MG/DL (ref 8.3–10.1)
CALCIUM SERPL-MCNC: 7.9 MG/DL (ref 8.3–10.1)
CARDIAC TROPONIN I PNL SERPL HS: 10 NG/L
CHLORIDE SERPL-SCNC: 100 MMOL/L (ref 100–108)
CLARITY UR: CLEAR
CO2 SERPL-SCNC: 27 MMOL/L (ref 21–32)
COLOR UR: YELLOW
CREAT SERPL-MCNC: 5.04 MG/DL (ref 0.6–1.3)
CRP SERPL QL: 8.2 MG/L
EOSINOPHIL # BLD AUTO: 0.16 THOUSAND/ΜL (ref 0–0.61)
EOSINOPHIL NFR BLD AUTO: 3 % (ref 0–6)
ERYTHROCYTE [DISTWIDTH] IN BLOOD BY AUTOMATED COUNT: 14.2 % (ref 11.6–15.1)
ERYTHROCYTE [SEDIMENTATION RATE] IN BLOOD: 105 MM/HOUR (ref 0–19)
GFR SERPL CREATININE-BSD FRML MDRD: 10 ML/MIN/1.73SQ M
GLUCOSE SERPL-MCNC: 166 MG/DL (ref 65–140)
GLUCOSE UR STRIP-MCNC: ABNORMAL MG/DL
HCT VFR BLD AUTO: 24.8 % (ref 36.5–49.3)
HGB BLD-MCNC: 7.9 G/DL (ref 12–17)
HGB UR QL STRIP.AUTO: ABNORMAL
HYALINE CASTS #/AREA URNS LPF: ABNORMAL /LPF
IMM GRANULOCYTES # BLD AUTO: 0.02 THOUSAND/UL (ref 0–0.2)
IMM GRANULOCYTES NFR BLD AUTO: 0 % (ref 0–2)
INR PPP: 3.15 (ref 0.84–1.19)
KETONES UR STRIP-MCNC: NEGATIVE MG/DL
LACTATE SERPL-SCNC: 1.4 MMOL/L (ref 0.5–2)
LEUKOCYTE ESTERASE UR QL STRIP: NEGATIVE
LYMPHOCYTES # BLD AUTO: 1.23 THOUSANDS/ΜL (ref 0.6–4.47)
LYMPHOCYTES NFR BLD AUTO: 20 % (ref 14–44)
MAGNESIUM SERPL-MCNC: 1.4 MG/DL (ref 1.6–2.6)
MCH RBC QN AUTO: 28.9 PG (ref 26.8–34.3)
MCHC RBC AUTO-ENTMCNC: 31.9 G/DL (ref 31.4–37.4)
MCV RBC AUTO: 91 FL (ref 82–98)
MONOCYTES # BLD AUTO: 0.59 THOUSAND/ΜL (ref 0.17–1.22)
MONOCYTES NFR BLD AUTO: 10 % (ref 4–12)
NEUTROPHILS # BLD AUTO: 4.04 THOUSANDS/ΜL (ref 1.85–7.62)
NEUTS SEG NFR BLD AUTO: 66 % (ref 43–75)
NITRITE UR QL STRIP: NEGATIVE
NON-SQ EPI CELLS URNS QL MICRO: ABNORMAL /HPF
NRBC BLD AUTO-RTO: 0 /100 WBCS
PH UR STRIP.AUTO: 6 [PH]
PHOSPHATE SERPL-MCNC: 5.4 MG/DL (ref 2.3–4.1)
PLATELET # BLD AUTO: 237 THOUSANDS/UL (ref 149–390)
PMV BLD AUTO: 10.2 FL (ref 8.9–12.7)
POTASSIUM SERPL-SCNC: 2.8 MMOL/L (ref 3.5–5.3)
PROT SERPL-MCNC: 6.2 G/DL (ref 6.4–8.2)
PROT UR STRIP-MCNC: >=300 MG/DL
PROTHROMBIN TIME: 31.4 SECONDS (ref 11.6–14.5)
RBC # BLD AUTO: 2.73 MILLION/UL (ref 3.88–5.62)
RBC #/AREA URNS AUTO: ABNORMAL /HPF
SODIUM SERPL-SCNC: 137 MMOL/L (ref 136–145)
SP GR UR STRIP.AUTO: 1.02 (ref 1–1.03)
UROBILINOGEN UR QL STRIP.AUTO: 0.2 E.U./DL
WBC # BLD AUTO: 6.08 THOUSAND/UL (ref 4.31–10.16)
WBC #/AREA URNS AUTO: ABNORMAL /HPF

## 2022-05-10 PROCEDURE — 99223 1ST HOSP IP/OBS HIGH 75: CPT | Performed by: INTERNAL MEDICINE

## 2022-05-10 PROCEDURE — 87086 URINE CULTURE/COLONY COUNT: CPT | Performed by: INTERNAL MEDICINE

## 2022-05-10 PROCEDURE — 1160F RVW MEDS BY RX/DR IN RCRD: CPT | Performed by: INTERNAL MEDICINE

## 2022-05-10 PROCEDURE — 87040 BLOOD CULTURE FOR BACTERIA: CPT | Performed by: INTERNAL MEDICINE

## 2022-05-10 PROCEDURE — 84484 ASSAY OF TROPONIN QUANT: CPT | Performed by: INTERNAL MEDICINE

## 2022-05-10 PROCEDURE — 84100 ASSAY OF PHOSPHORUS: CPT | Performed by: INTERNAL MEDICINE

## 2022-05-10 PROCEDURE — 1036F TOBACCO NON-USER: CPT | Performed by: INTERNAL MEDICINE

## 2022-05-10 PROCEDURE — 81001 URINALYSIS AUTO W/SCOPE: CPT | Performed by: INTERNAL MEDICINE

## 2022-05-10 PROCEDURE — 93005 ELECTROCARDIOGRAM TRACING: CPT

## 2022-05-10 PROCEDURE — 83605 ASSAY OF LACTIC ACID: CPT | Performed by: INTERNAL MEDICINE

## 2022-05-10 PROCEDURE — 85025 COMPLETE CBC W/AUTO DIFF WBC: CPT | Performed by: INTERNAL MEDICINE

## 2022-05-10 PROCEDURE — 86140 C-REACTIVE PROTEIN: CPT | Performed by: INTERNAL MEDICINE

## 2022-05-10 PROCEDURE — 83735 ASSAY OF MAGNESIUM: CPT | Performed by: INTERNAL MEDICINE

## 2022-05-10 PROCEDURE — 85652 RBC SED RATE AUTOMATED: CPT | Performed by: INTERNAL MEDICINE

## 2022-05-10 PROCEDURE — 99213 OFFICE O/P EST LOW 20 MIN: CPT | Performed by: INTERNAL MEDICINE

## 2022-05-10 PROCEDURE — 80053 COMPREHEN METABOLIC PANEL: CPT | Performed by: INTERNAL MEDICINE

## 2022-05-10 PROCEDURE — 85610 PROTHROMBIN TIME: CPT | Performed by: INTERNAL MEDICINE

## 2022-05-10 RX ORDER — FLECAINIDE ACETATE 100 MG/1
100 TABLET ORAL 2 TIMES DAILY
Status: DISCONTINUED | OUTPATIENT
Start: 2022-05-11 | End: 2022-05-10

## 2022-05-10 RX ORDER — DULOXETIN HYDROCHLORIDE 30 MG/1
60 CAPSULE, DELAYED RELEASE ORAL DAILY
Status: DISCONTINUED | OUTPATIENT
Start: 2022-05-11 | End: 2022-06-04 | Stop reason: HOSPADM

## 2022-05-10 RX ORDER — GABAPENTIN 100 MG/1
100 CAPSULE ORAL DAILY
Status: DISCONTINUED | OUTPATIENT
Start: 2022-05-11 | End: 2022-06-04 | Stop reason: HOSPADM

## 2022-05-10 RX ORDER — POTASSIUM CHLORIDE 20 MEQ/1
40 TABLET, EXTENDED RELEASE ORAL ONCE
Status: COMPLETED | OUTPATIENT
Start: 2022-05-10 | End: 2022-05-10

## 2022-05-10 RX ORDER — DOXYCYCLINE HYCLATE 100 MG/1
100 CAPSULE ORAL EVERY 12 HOURS SCHEDULED
Status: DISCONTINUED | OUTPATIENT
Start: 2022-05-11 | End: 2022-06-01

## 2022-05-10 RX ORDER — METOPROLOL SUCCINATE 50 MG/1
100 TABLET, EXTENDED RELEASE ORAL DAILY
Status: DISCONTINUED | OUTPATIENT
Start: 2022-05-11 | End: 2022-06-04 | Stop reason: HOSPADM

## 2022-05-10 RX ORDER — ALBUMIN, HUMAN INJ 5% 5 %
25 SOLUTION INTRAVENOUS ONCE
Status: COMPLETED | OUTPATIENT
Start: 2022-05-10 | End: 2022-05-10

## 2022-05-10 RX ORDER — ONDANSETRON 2 MG/ML
4 INJECTION INTRAMUSCULAR; INTRAVENOUS EVERY 4 HOURS PRN
Status: DISCONTINUED | OUTPATIENT
Start: 2022-05-10 | End: 2022-05-11

## 2022-05-10 RX ORDER — FLECAINIDE ACETATE 100 MG/1
50 TABLET ORAL 2 TIMES DAILY
Status: DISCONTINUED | OUTPATIENT
Start: 2022-05-11 | End: 2022-05-11

## 2022-05-10 RX ORDER — AMLODIPINE BESYLATE 5 MG/1
5 TABLET ORAL DAILY
Status: DISCONTINUED | OUTPATIENT
Start: 2022-05-11 | End: 2022-05-14

## 2022-05-10 RX ORDER — TAMSULOSIN HYDROCHLORIDE 0.4 MG/1
0.4 CAPSULE ORAL
Status: DISCONTINUED | OUTPATIENT
Start: 2022-05-11 | End: 2022-06-04 | Stop reason: HOSPADM

## 2022-05-10 RX ORDER — PANTOPRAZOLE SODIUM 40 MG/1
40 TABLET, DELAYED RELEASE ORAL
Status: DISCONTINUED | OUTPATIENT
Start: 2022-05-11 | End: 2022-06-04 | Stop reason: HOSPADM

## 2022-05-10 RX ORDER — DOCUSATE SODIUM 100 MG/1
100 CAPSULE, LIQUID FILLED ORAL DAILY
Status: DISCONTINUED | OUTPATIENT
Start: 2022-05-10 | End: 2022-06-01

## 2022-05-10 RX ORDER — POTASSIUM CHLORIDE 14.9 MG/ML
20 INJECTION INTRAVENOUS ONCE
Status: COMPLETED | OUTPATIENT
Start: 2022-05-10 | End: 2022-05-11

## 2022-05-10 RX ORDER — MAGNESIUM SULFATE 1 G/100ML
1 INJECTION INTRAVENOUS ONCE
Status: COMPLETED | OUTPATIENT
Start: 2022-05-10 | End: 2022-05-10

## 2022-05-10 RX ORDER — ACETAMINOPHEN 325 MG/1
650 TABLET ORAL EVERY 6 HOURS PRN
Status: DISCONTINUED | OUTPATIENT
Start: 2022-05-10 | End: 2022-06-04 | Stop reason: HOSPADM

## 2022-05-10 RX ORDER — PRAVASTATIN SODIUM 40 MG
40 TABLET ORAL
Status: DISCONTINUED | OUTPATIENT
Start: 2022-05-11 | End: 2022-06-04 | Stop reason: HOSPADM

## 2022-05-10 RX ORDER — ONDANSETRON 4 MG/1
4 TABLET, FILM COATED ORAL EVERY 8 HOURS PRN
Qty: 30 TABLET | Refills: 0 | Status: SHIPPED | OUTPATIENT
Start: 2022-05-10

## 2022-05-10 RX ORDER — METHOCARBAMOL 500 MG/1
750 TABLET, FILM COATED ORAL EVERY 6 HOURS PRN
Status: DISCONTINUED | OUTPATIENT
Start: 2022-05-10 | End: 2022-06-04 | Stop reason: HOSPADM

## 2022-05-10 RX ORDER — GABAPENTIN 300 MG/1
300 CAPSULE ORAL
Status: DISCONTINUED | OUTPATIENT
Start: 2022-05-10 | End: 2022-06-04 | Stop reason: HOSPADM

## 2022-05-10 RX ADMIN — ALBUMIN (HUMAN) 25 G: 12.5 INJECTION, SOLUTION INTRAVENOUS at 21:50

## 2022-05-10 RX ADMIN — POTASSIUM CHLORIDE 40 MEQ: 1500 TABLET, EXTENDED RELEASE ORAL at 21:50

## 2022-05-10 RX ADMIN — MAGNESIUM SULFATE HEPTAHYDRATE 1 G: 1 INJECTION, SOLUTION INTRAVENOUS at 21:51

## 2022-05-10 RX ADMIN — ONDANSETRON 4 MG: 2 INJECTION INTRAMUSCULAR; INTRAVENOUS at 20:22

## 2022-05-10 RX ADMIN — GABAPENTIN 300 MG: 300 CAPSULE ORAL at 21:50

## 2022-05-10 RX ADMIN — POTASSIUM CHLORIDE 20 MEQ: 14.9 INJECTION, SOLUTION INTRAVENOUS at 21:51

## 2022-05-10 RX ADMIN — ACETAMINOPHEN 650 MG: 325 TABLET, FILM COATED ORAL at 20:22

## 2022-05-10 NOTE — TELEPHONE ENCOUNTER
Pt wife called regarding the pt, she states that her  was taking a med in the hospital for the nauseas that she no remember the name, and like to know if you can send in anything that can help him with the nauseas to [facility] in [place]

## 2022-05-10 NOTE — PROGRESS NOTES
Unfortunately creatinine continues to worsen and patient spoken to and feeling nauseous and weak  Will refer to PACS for direct admit to [facility]  Patient aware

## 2022-05-10 NOTE — ASSESSMENT & PLAN NOTE
· Creatinine on admission: 5 04  · Direct admit due to rising creatinine outpatient  · Bilateral lower extremity 2+ pitting edema  · Prior admission: admitted on 04/22,  Discharged on 04/29 with creatinine 3 85     · Believed to be due to volume overload, postinfectious GN, Staph aureus bacteremia  · Renal biopsy was not performed  · Baseline prior to this event was 0 6-0 9  · Fluid restriction, monitor I/O  · Avoid nephrotoxins, hypotension, IV contrast  · Consult nephrology

## 2022-05-10 NOTE — ASSESSMENT & PLAN NOTE
· S/p cervical fusion performed by Dr Mak Dudley on 2/58/11, complicated by MRSA infection  · Completed 24 days of IV vancomycin, transitioned to daptomycin on 04/24-4/29    Discharged home on doxycycline 100 mg b i d    · Patient is unsure when this will be completed   · Continue doxycycline  · Consult infectious disease

## 2022-05-10 NOTE — H&P
[place]  H&P- Nolan Arriaza 1950, 71 y o  male MRN: [id number]  Unit/Bed#: -Yaya Encounter: [id number]  Primary Care Provider: Mikael Dean MD   Date and time admitted to hospital: 5/10/2022  6:51 PM    * JANEL (acute kidney injury) [facility])  Assessment & Plan  · Creatinine on admission: 5 04  · Direct admit due to rising creatinine outpatient  · Bilateral lower extremity 2+ pitting edema  · Prior admission: admitted on 04/22,  Discharged on 04/29 with creatinine 3 85  · Believed to be due to volume overload, postinfectious GN, Staph aureus bacteremia  · Renal biopsy was not performed  · Baseline prior to this event was 0 6-0 9  · Fluid restriction, monitor I/O  · Avoid nephrotoxins, hypotension, IV contrast  · Consult nephrology    Surgical site infection  Assessment & Plan  · S/p cervical fusion performed by Dr Jose L Henderson on 5/80/97, complicated by MRSA infection  · Completed 24 days of IV vancomycin, transitioned to daptomycin on 04/24-4/29  Discharged home on doxycycline 100 mg b i d    · Patient is unsure when this will be completed   · Continue doxycycline  · Consult infectious disease    Glomerulonephritis  Assessment & Plan  · Diagnosed during prior admission  · Biopsy was not performed during prior admit  · Postinfectious suspected  · Treatment per nephrology     Anemia  Assessment & Plan  · Hemoglobin on admission:  7 9  · Patient with hematuria  Believed to be due to GN  · During prior admission, new normocytic anemia, hemoglobin had been 6 6, received 1 unit of PRBCs on 04/22  · FOBT was negative  · Transfuse if hemoglobin less than 7    Hypoalbuminemia  Assessment & Plan  · Albumin 2 1 on admission   · Patient with bilateral lower extremity swelling    Suspect anasarca   · Ordered 1 dose of IV albumin  · Nephrology consulted    Chronic diastolic (congestive) heart failure (HCC)  Assessment & Plan  Wt Readings from Last 3 Encounters:   05/10/22 114 kg (251 lb 5 2 oz)   05/03/22 114 kg (251 lb)   04/29/22 113 kg (250 lb 1 6 oz)     · Prior echo 04/25/2022:  EF 21%, Diastolic function: grade 2 pseudonormal relaxation  · Monitor I/O and daily weights  · Does not take outpatient diuretics  IV Lasix with minimal results during prior admission  Patient had received albumin to help with anasarca  · Fluid restriction, 2 g sodium    Hypokalemia  Assessment & Plan  · Potassium 2 8 on admission  · Ordered 40 mEq p o  potassium and 20 mEq IV potassium   · On telemetry   · Continue to monitor and replete as needed    Hypomagnesemia  Assessment & Plan  · Magnesium 1 4 on admission  · Ordered 1 g IV magnesium   · Continue to monitor and replete as needed    Weakness  Assessment & Plan  · Reports ongoing weakness and fatigue  Per wife patient spends majority of the day napping  · Denies recent falls  · Suspect ongoing weakness/fatigue due to ongoing infection, JANEL, GN and deconditioning from frequent hospital admissions  · Consult PT/OT    Mixed hyperlipidemia  Assessment & Plan  · Continue statin    Paroxysmal A-fib (HCC)  Assessment & Plan  · Home regimen: metoprolol succinate 100 mg daily and flecainide 100 mg b i d   · Continue metoprolol and decreased dose of flecainide to 50 mg b i d due to renal function  · Coumadin 2 5 mg 5x weekly, 5 mg 2x weekly  · INR 4 0 on 05/09  Outpatient provider had instructed patient to hold dose on 5/9 and 5/10  · INR on admission on 05/10 was 3 15  Continue hold of Coumadin  · INR goal 2-3  · Consult cardiology for assistance with flecainide dosing due to elevated creatinine    Depression, major, single episode, moderate (HCC)  Assessment & Plan  · Continue Cymbalta    Factor V Leiden mutation ([facility] [address] )  Assessment & Plan  · Prescribed Coumadin  · INR 4 0 on 05/09  Outpatient provider had instructed patient to hold dose on 5/9 and 5/10  · INR on admission on 05/10 was 3 15    Continue hold of Coumadin  · INR goal 2-3    WILL (obstructive sleep apnea)  Assessment & Plan  · CPAP HS - patient brought home machine in    Essential hypertension  Assessment & Plan  · Home regimen:  Amlodipine 5 mg daily and metoprolol succinate 100 mg daily    VTE Pharmacologic Prophylaxis: VTE Score: 3 Moderate Risk (Score 3-4) - Pharmacological DVT Prophylaxis Ordered: warfarin (Coumadin)  Code Status: Level 3 - DNAR and DNI   Discussion with family: Updated  (wife) at bedside  Anticipated Length of Stay: Patient will be admitted on an inpatient basis with an anticipated length of stay of greater than 2 midnights secondary to JANEL, surgical site infection, hypokalemia  Total Time for Visit, including Counseling / Coordination of Care: 60 minutes Greater than 50% of this total time spent on direct patient counseling and coordination of care  Chief Complaint:  Worsening labs outpatient    History of Present Illness:  Delmi Romero is a 71 y o  male with a PMH of anemia, CHF, depression, hypertension, factor 5 Leiden on Coumadin, hyperlipidemia, WILL, AFib, obesity who presents from home due to elevated creatinine on outpatient labs  Patient had been initially admitted on 04/22 due to elevated creatinine believed to be due to post infectious GN  This was a presumed diagnosis, biopsy was not performed  Had a cervical fusion performed on 10/81 that was complicated by MRSA infection  Patient had received IV vancomycin  During past admission IV vancomycin was then transitioned to daptomycin by Infectious Disease recommendations from 04/24 through 4/29  Patient was also seen by Nephrology who recommended fluid restriction, albumin and avoiding nephrotoxins to improve creatinine  Patient was then discharged on 04/29 with doxycycline 100 mg b i d   Creatinine on discharge date was 3 85  Patient then had outpatient lab work performed on 05/09    Due to creatinine being over 4 his nephrologist had patient directly admitted to the hospital   Labs were performed at [address] so are not available on epic  INR 4 0 on 05/09  Outpatient provider had instructed patient to hold Coumadin on 05/09 and 5/10  Patient compliant  Patient reports since discharge approximately 2 weeks ago he has had continued weakness and fatigue  Wife states that he has been majority of the day napping  When he is not napping he spends a lot of the time drinking water due to feeling thirsty  Patient was unaware that he was on a fluid restriction during last admission  Reports compliance with home medications  Denies chest pain, abdominal pain, vomiting, diarrhea, fevers or chills  Reports continued hematuria  Review of Systems:  Review of Systems   Constitutional: Positive for fatigue  Negative for fever  HENT: Negative for sore throat  Respiratory: Negative for cough, chest tightness and shortness of breath  Cardiovascular: Negative for chest pain  Gastrointestinal: Negative for abdominal distention, abdominal pain, diarrhea, nausea and vomiting  Genitourinary: Positive for hematuria  Negative for difficulty urinating  Musculoskeletal: Negative for arthralgias  Neurological: Positive for weakness  Negative for headaches  Psychiatric/Behavioral: Negative for agitation and behavioral problems  All other systems reviewed and are negative        Past Medical and Surgical History:   Past Medical History:   Diagnosis Date    Acute venous embolism and thrombosis of deep vessels of proximal lower extremity (HCC)     Last assessed: 5/18/15    Arthritis     Cellulitis     LE    Chronic kidney disease 3/2020    Acute Kidney Injury    CPAP (continuous positive airway pressure) dependence     Factor V Leiden ([address] )     Forgetfulness     Headache(784 0) 3/2022    Never till cervical  spine surgery    Heart murmur 3/2020    Told when in hospital   [facility] [place] (hard of hearing)     Paroxysmal atrial fibrillation ([address] )     Last assessed: 11/2/15    Sleep apnea Past Surgical History:   Procedure Laterality Date    BACK SURGERY      Lower    COLON SURGERY      COLONOSCOPY      INCISION AND DRAINAGE POSTERIOR SPINE N/A 4/1/2022    Procedure: Posterior cervical evacuation of postoperative collection and debridement with placement of drains C3-T1; Surgeon: Vincent Grace MD;  Location: [facility];  Service: Neurosurgery    HI ARTHRODESIS ANT INTERBODY MIN DISCECTOMY, CERVICAL BELOW C2 N/A 3/11/2022    Procedure: Anterior cervical discectomy and fixation fusion C5/6 and C6/7; Posterior cervical decompression and instrumented fusion C3-T1; Surgeon: Vincent Grace MD;  Location: [facility];  Service: Neurosurgery    SPINE SURGERY  3/11/2022    Cervical myelopathy/ cervical fusion       Meds/Allergies:  Prior to Admission medications    Medication Sig Start Date End Date Taking?  Authorizing Provider   acetaminophen (TYLENOL) 325 mg tablet Take 3 tablets (975 mg total) by mouth 3 (three) times a day as needed for mild pain 4/15/22  Yes Leticia Borja PA-C   amLODIPine (NORVASC) 5 mg tablet Take 1 tablet (5 mg total) by mouth daily 3/29/22  Yes Bernabe Mustafa MD   docusate sodium (COLACE) 100 mg capsule Take 1 capsule (100 mg total) by mouth 2 (two) times a day  Patient taking differently: Take 100 mg by mouth in the morning   3/29/22  Yes Bernabe Mustafa MD   doxycycline (ADOXA) 100 MG tablet Take 1 tablet (100 mg total) by mouth 2 (two) times a day Take until you follow up postoperatively at [facility] 4/29/22 5/29/22 Yes Janki Harper PA-C   DULoxetine (CYMBALTA) 60 mg delayed release capsule TAKE 1 CAPSULE BY MOUTH  DAILY 10/5/21  Yes David Ortiz MD   flecainide (TAMBOCOR) 100 mg tablet TAKE 1 TABLET BY MOUTH  TWICE DAILY 10/5/21  Yes David Ortiz MD   gabapentin (NEURONTIN) 100 mg capsule Take 1 capsule (100 mg total) by mouth in the morning 3/29/22  Yes Bernabe Mustafa MD   gabapentin (NEURONTIN) 300 mg capsule Take 1 capsule (300 mg total) by mouth daily at bedtime 3/28/22  Yes Grace Lopez MD   methocarbamol (ROBAXIN) 750 mg tablet Take 1 tablet (750 mg total) by mouth every 6 (six) hours as needed for muscle spasms 4/15/22  Yes Zenobia Hansen PA-C   metoprolol succinate (TOPROL-XL) 100 mg 24 hr tablet TAKE 1 TABLET BY MOUTH  DAILY 3/23/22  Yes Dung George MD   ondansetron [facility]) 4 mg tablet Take 1 tablet (4 mg total) by mouth every 8 (eight) hours as needed for nausea or vomiting 5/10/22  Yes Dung George MD   pantoprazole (PROTONIX) 40 mg tablet Take 1 tablet (40 mg total) by mouth 2 (two) times a day before meals 4/29/22  Yes Remi Candelaria PA-C   pravastatin (PRAVACHOL) 40 mg tablet TAKE 1 TABLET BY MOUTH  DAILY 3/23/22  Yes Dung George MD   tamsulosin (FLOMAX) 0 4 mg TAKE 1 CAPSULE BY MOUTH  DAILY WITH DINNER 10/5/21  Yes Dung George MD   warfarin (COUMADIN) 5 mg tablet Take by mouth daily Take 2 5 mg (1/2 tablets) daily except Wednesday and Saturday take 5 mg daily  Yes Marques Crawley MD   polyethylene glycol (MIRALAX) 17 g packet Take 17 g by mouth daily as needed (constipation) 4/29/22   Remi Candelaria PA-C     I have reviewed home medications with patient personally  Allergies:    Allergies   Allergen Reactions    Morphine GI Intolerance    Vitamin K Other (See Comments)     On coumadin-patient sensitive to vitamin K amounts in meds etc        Social History:  Marital Status: /Civil Union   Occupation:  Unknown  Patient Pre-hospital Living Situation: Home  Patient Pre-hospital Level of Mobility: walks  Patient Pre-hospital Diet Restrictions:  CHF/renal  Substance Use History:   Social History     Substance and Sexual Activity   Alcohol Use Not Currently    Alcohol/week: 0 0 standard drinks     Social History     Tobacco Use   Smoking Status Never Smoker   Smokeless Tobacco Never Used     Social History     Substance and Sexual Activity   Drug Use No       Family History:  Family History   Problem Relation Age of Onset    Lung cancer Mother     Hearing loss Father     Heart attack Brother     Atrial fibrillation Brother     Emphysema Sister        Physical Exam:     Vitals:   Blood Pressure: 146/62 (05/10/22 2317)  Pulse: (!) 50 (05/10/22 2317)  Temperature: 97 9 °F (36 6 °C) (05/10/22 1856)  Temp Source: Oral (05/11/22 0247)  Respirations: 16 (05/10/22 2317)  Weight - Scale: 114 kg (251 lb 5 2 oz) (05/10/22 1929)  SpO2: 97 % (05/10/22 2317)    Physical Exam  Vitals and nursing note reviewed  Constitutional:       Appearance: Normal appearance  He is obese  Interventions: Cervical collar in place  HENT:      Head: Normocephalic  Eyes:      Extraocular Movements: Extraocular movements intact  Pupils: Pupils are equal, round, and reactive to light  Cardiovascular:      Rate and Rhythm: Normal rate and regular rhythm  Heart sounds: No murmur heard  No gallop  Pulmonary:      Effort: No respiratory distress  Breath sounds: Normal breath sounds  No wheezing  Abdominal:      General: Bowel sounds are normal  There is no distension  Tenderness: There is no abdominal tenderness  Musculoskeletal:         General: Normal range of motion  Cervical back: Normal range of motion  Right lower leg: Edema (+2) present  Left lower leg: Edema (+2) present  Skin:     General: Skin is warm  Neurological:      General: No focal deficit present  Mental Status: He is alert and oriented to person, place, and time  Mental status is at baseline  Psychiatric:         Mood and Affect: Mood normal          Behavior: Behavior normal          Thought Content:  Thought content normal           Additional Data:     Lab Results:  Results from last 7 days   Lab Units 05/10/22  1949   WBC Thousand/uL 6 08   HEMOGLOBIN g/dL 7 9*   HEMATOCRIT % 24 8*   PLATELETS Thousands/uL 237   NEUTROS PCT % 66   LYMPHS PCT % 20   MONOS PCT % 10   EOS PCT % 3     Results from last 7 days Lab Units 05/10/22  1950   SODIUM mmol/L 137   POTASSIUM mmol/L 2 8*   CHLORIDE mmol/L 100   CO2 mmol/L 27   BUN mg/dL 35*   CREATININE mg/dL 5 04*   ANION GAP mmol/L 10   CALCIUM mg/dL 7 9*   ALBUMIN g/dL 2 1*   TOTAL BILIRUBIN mg/dL 0 40   ALK PHOS U/L 94   ALT U/L 10*   AST U/L 14   GLUCOSE RANDOM mg/dL 166*     Results from last 7 days   Lab Units 05/10/22  1950   INR  3 15*             Results from last 7 days   Lab Units 05/10/22  1949   LACTIC ACID mmol/L 1 4       Imaging: No pertinent imaging reviewed  No orders to display       ** Please Note: This note has been constructed using a voice recognition system   **

## 2022-05-10 NOTE — ASSESSMENT & PLAN NOTE
· Prescribed Coumadin  · INR 4 0 on 05/09  Outpatient provider had instructed patient to hold dose on 5/9 and 5/10  · INR on admission on 05/10 was 3 15    Continue hold of Coumadin  · INR goal 2-3

## 2022-05-10 NOTE — ASSESSMENT & PLAN NOTE
· Home regimen: metoprolol succinate 100 mg daily and flecainide 100 mg b i d   · Continue metoprolol and decreased dose of flecainide to 50 mg b i d due to renal function  · Coumadin 2 5 mg 5x weekly, 5 mg 2x weekly  · INR 4 0 on 05/09  Outpatient provider had instructed patient to hold dose on 5/9 and 5/10  · INR on admission on 05/10 was 3 15    Continue hold of Coumadin  · INR goal 2-3  · Consult cardiology for assistance with flecainide dosing due to elevated creatinine

## 2022-05-10 NOTE — ASSESSMENT & PLAN NOTE
· Diagnosed during prior admission  · Biopsy was not performed during prior admit  · Postinfectious suspected  · Treatment per nephrology

## 2022-05-10 NOTE — PROGRESS NOTES
Outpatient Care Management Note:    Care manager called and spoke with Tian's wife, Shazia Samuel  Tony Perrin is scheduled to have a virtual appt with infectious disease today  Shazia Samuel states that Tian's urine continues to be pink in color  He had blood work completed on 5/5/22 and 5/9/22 but results are not visible in epic  Shazia Samuel states that [facility] nurse told her his creatinine was elevated above 4  CM encouraged Shazia Samuel to call nephrology and be sure they have the new lab results or can at least call to get them  Shazia Samuel will update nephrology about Tian's urine color and update them that he has ongoing nausea  She states he is eating which helps his nausea symptoms  Shazia Samuel is aware CM will be off Thursday and Friday  She will call Dr Mirza Hernandez with any questions

## 2022-05-10 NOTE — ASSESSMENT & PLAN NOTE
· Hemoglobin on admission:  7 9  · Patient with hematuria    Believed to be due to GN  · During prior admission, new normocytic anemia, hemoglobin had been 6 6, received 1 unit of PRBCs on 04/22  · FOBT was negative  · Transfuse if hemoglobin less than 7

## 2022-05-10 NOTE — PROGRESS NOTES
Virtual Regular Visit    Verification of patient location:    Patient is located in the following state in which I hold an active license [place]      Assessment/Plan:  1  Postinfectious glomerulonephritis  Discussed with Nephrology, Dr Lisa Niño, and with patient and his wife  With his creatinine rising and continued hematuria we feel that it would be best for him to be rehospitalized for continued treatment and observation  Patient wishes to be hospitalized at [facility]  His nephrologist and I will continue to coordinate with the [facility] physicians  Nephrology will arrange for admission  2  Postoperative MRSA cervical infection  If possible to control nausea would like to complete approximately 2 more weeks of oral doxycycline to finish a total of 2 month IV plus p o  antibiotic course  Problem List Items Addressed This Visit     None               Reason for visit is   Chief Complaint   Patient presents with    Virtual Brief Visit    Virtual Regular Visit        Encounter provider Irma Segovia MD    Provider located at [address] [place] [id number] [phone]      Recent Visits  Date Type Provider Dept   05/09/22 Telephone Stephany Miller LPN [facility]   05/05/22 Office Visit Marisabel Madsen MD [facility] [address] One recent visits within past 7 days and meeting all other requirements  Today's Visits  Date Type Provider Dept   05/10/22 Telemedicine Irma Segovia MD [facility]   Showing today's visits and meeting all other requirements  Future Appointments  No visits were found meeting these conditions  Showing future appointments within next 150 days and meeting all other requirements       The patient was identified by name and date of birth   Delmi Romero was informed that this is a telemedicine visit and that the visit is being conducted through [address] and patient was informed this is a secure, HIPAA-complaint platform  He agrees to proceed     My office door was closed  No one else was in the room  He acknowledged consent and understanding of privacy and security of the video platform  The patient has agreed to participate and understands they can discontinue the visit at any time  Patient is aware this is a billable service  Subjective  Orma Shown is a 71 y o  male who is being followed for a postoperative cervical MRSA infection complicated by post infectious glomerulonephritis  He currently is complaining of not feeling well with nausea that is temporarily controlled with pantoprazole, urinating pink for the urine   HPI Orma Shown is a 71 y o  male patient with past medical history of hypertension, chronic pain, atrial fibrillation, factor 5 Leiden, hyperlipidemia, anemia who originally presented to the hospital on 4/22/2022 due to generalized weakness  Patient is 6 weeks post cervical fusion complicated by MRSA infection receiving IV vancomycin at home  Patient had recently been hospitalized at [facility] 3/31 to 4/15 due to surgical site infection  Original surgery 4/1, discharge 04/15 for 4 weeks of IV vancomycin via PICC line followed by 4 weeks of p o  Antibiotics  Patient complained of fatigue, was found to have hemoglobin 6 7 on admission with a baseline of 7  He also had bilateral lower and upper extremity edema  He did receive blood transfusion, was then found to have JANEL with creatinine trending upwards throughout his admission  Nephrology followed closely  Suspect glomerulonephritis, post infectious  Infectious Disease (Dr Hans Wilson) was consulted to manage antibiotics and discussed with me   Recommending patient continue on doxycycline 100 mg b i d  Until he is able to follow-up with me   Original plan was to complete 4 weeks of IV antibiotics followed by up to 4 weeks of oral  He completed his 4 week IV course and is on 11 days of prospective 28 day oral course  when he was seen as an outpatient in a from the GI office by their physician assistant he was told that he needs to be followed by Dr Isabell Barton  His current laboratory work 5/9 ordered by Nephrology is concerning when compared to 05/05  Labs:  His WBC count remains normal and essentia toly unchanged at 6400, his hemoglobin has decreased from 8 3-7 6, his creatinine has increased from 4 32 to 4 56   Past Medical History:   Diagnosis Date    Acute venous embolism and thrombosis of deep vessels of proximal lower extremity (HCC)     Last assessed: 5/18/15    Arthritis     Cellulitis     LE    Chronic kidney disease 3/2020    Acute Kidney Injury    CPAP (continuous positive airway pressure) dependence     Factor V Leiden ([address] )     Forgetfulness     Headache(784 0) 3/2022    Never till cervical  spine surgery    Heart murmur 3/2020    Told when in hospital   [facility] (hard of hearing)     Paroxysmal atrial fibrillation ([address] )     Last assessed: 11/2/15    Sleep apnea        Past Surgical History:   Procedure Laterality Date    BACK SURGERY      Lower    COLON SURGERY      COLONOSCOPY      INCISION AND DRAINAGE POSTERIOR SPINE N/A 4/1/2022    Procedure: Posterior cervical evacuation of postoperative collection and debridement with placement of drains C3-T1; Surgeon: Vijay Harp MD;  [facility];  Service: Neurosurgery    [place] ARTHRODESIS ANT INTERBODY MIN DISCECTOMY, CERVICAL BELOW C2 N/A 3/11/2022    Procedure: Anterior cervical discectomy and fixation fusion C5/6 and C6/7; Posterior cervical decompression and instrumented fusion C3-T1;   Surgeon: Vijay Harp MD;  [facility];  Service: Neurosurgery   [facility] SURGERY  3/11/2022    Cervical myelopathy/ cervical fusion       Current Outpatient Medications   Medication Sig Dispense Refill    acetaminophen (TYLENOL) 325 mg tablet Take 3 tablets (975 mg total) by mouth 3 (three) times a day as needed for mild pain  0    amLODIPine (NORVASC) 5 mg tablet Take 1 tablet (5 mg total) by mouth daily 30 tablet 0    docusate sodium (COLACE) 100 mg capsule Take 1 capsule (100 mg total) by mouth 2 (two) times a day (Patient taking differently: Take 100 mg by mouth in the morning  ) 60 capsule 0    doxycycline (ADOXA) 100 MG tablet Take 1 tablet (100 mg total) by mouth 2 (two) times a day Take until you follow up postoperatively at [facility] 60 tablet 0    DULoxetine (CYMBALTA) 60 mg delayed release capsule TAKE 1 CAPSULE BY MOUTH  DAILY 90 capsule 1    flecainide (TAMBOCOR) 100 mg tablet TAKE 1 TABLET BY MOUTH  TWICE DAILY 180 tablet 1    gabapentin (NEURONTIN) 100 mg capsule Take 1 capsule (100 mg total) by mouth in the morning 30 capsule 0    gabapentin (NEURONTIN) 300 mg capsule Take 1 capsule (300 mg total) by mouth daily at bedtime 30 capsule 0    methocarbamol (ROBAXIN) 750 mg tablet Take 1 tablet (750 mg total) by mouth every 6 (six) hours as needed for muscle spasms 30 tablet 0    metoprolol succinate (TOPROL-XL) 100 mg 24 hr tablet TAKE 1 TABLET BY MOUTH  DAILY 90 tablet 0    ondansetron (ZOFRAN) 4 mg tablet Take 1 tablet (4 mg total) by mouth every 8 (eight) hours as needed for nausea or vomiting 30 tablet 0    pantoprazole (PROTONIX) 40 mg tablet Take 1 tablet (40 mg total) by mouth 2 (two) times a day before meals 30 tablet 0    polyethylene glycol (MIRALAX) 17 g packet Take 17 g by mouth daily as needed (constipation) 30 each 0    pravastatin (PRAVACHOL) 40 mg tablet TAKE 1 TABLET BY MOUTH  DAILY 90 tablet 1    tamsulosin (FLOMAX) 0 4 mg TAKE 1 CAPSULE BY MOUTH  DAILY WITH DINNER 90 capsule 1    warfarin (COUMADIN) 5 mg tablet Take by mouth daily Take 2 5 mg (1/2 tablets) daily except Wednesday and Saturday take 5 mg daily         No current facility-administered medications for this visit  Allergies   Allergen Reactions    Morphine GI Intolerance    Vitamin K Other (See Comments)     On coumadin-patient sensitive to vitamin K amounts in meds etc        Review of Systems    Video Exam  Pictures of the patient's back wound that with taken by his wife yesterday are attached  There were no vitals filed for this visit  Physical Exam     I spent 45 minutes directly with the patient during this visit    [address] verbally agrees to participate in [employer]  Pt is aware that [employer] could be limited without vital signs or the ability to perform a full hands-on physical exam  Tian Claudio understands he or the provider may request at any time to terminate the video visit and request the patient to seek care or treatment in person

## 2022-05-10 NOTE — PROGRESS NOTES
Virtual Regular Visit    Verification of patient location:    Patient is located in the following state in which I hold an active license {[facility] virtual patient location:[address]}      Assessment/Plan:    Problem List Items Addressed This Visit     None               Reason for visit is   Chief Complaint   Patient presents with    Virtual Brief Visit    Virtual Regular Visit        Encounter provider Pretty Hollis MD    Provider located at [address] [place] [id number] [phone]      Recent Visits  Date Type Provider Dept   05/09/22 Telephone Real BERENICE [place] [facility]   05/05/22 Office Visit Mary Jo Osman MD [facility] [address] One recent visits within past 7 days and meeting all other requirements  Today's Visits  Date Type Provider Dept   05/10/22 Telemedicine Pretty Hollis MD [facility]   Showing today's visits and meeting all other requirements  Future Appointments  No visits were found meeting these conditions  Showing future appointments within next 150 days and meeting all other requirements       The patient was identified by name and date of birth  Iva Suarez was informed that this is a telemedicine visit and that the visit is being conducted through {AMB VIRTUAL VISIT YDEEJA:83792}  {Telemedicine confidentiality :41704} {Telemedicine participants:89847}  He acknowledged consent and understanding of privacy and security of the video platform  The patient has agreed to participate and understands they can discontinue the visit at any time  Patient is aware this is a billable service  Subjective  Iva Suarez is a 71 y o  male ***         HPI     Past Medical History:   Diagnosis Date    Acute venous embolism and thrombosis of deep vessels of proximal lower extremity (HCC)     Last assessed: 5/18/15    Arthritis     Cellulitis     LE  Chronic kidney disease 3/2020    Acute Kidney Injury    CPAP (continuous positive airway pressure) dependence     Factor V Leiden ([facility] )     Forgetfulness     Headache(784 0) 3/2022    Never till cervical  spine surgery    Heart murmur 3/2020    Told when in hospital   [facility] (hard of hearing)     Paroxysmal atrial fibrillation ([facility] )     Last assessed: 11/2/15    Sleep apnea        Past Surgical History:   Procedure Laterality Date    BACK SURGERY      Lower    COLON SURGERY      COLONOSCOPY      INCISION AND DRAINAGE POSTERIOR SPINE N/A 4/1/2022    Procedure: Posterior cervical evacuation of postoperative collection and debridement with placement of drains C3-T1; Surgeon: Alyssa Camarillo MD;  [facility];  Service: Neurosurgery    [place] ARTHRODESIS ANT INTERBODY MIN DISCECTOMY, CERVICAL BELOW C2 N/A 3/11/2022    Procedure: Anterior cervical discectomy and fixation fusion C5/6 and C6/7; Posterior cervical decompression and instrumented fusion C3-T1;   Surgeon: Alyssa Camarillo MD;  [facility];  Service: Neurosurgery    SPINE SURGERY  3/11/2022    Cervical myelopathy/ cervical fusion       Current Outpatient Medications   Medication Sig Dispense Refill    acetaminophen (TYLENOL) 325 mg tablet Take 3 tablets (975 mg total) by mouth 3 (three) times a day as needed for mild pain  0    amLODIPine (NORVASC) 5 mg tablet Take 1 tablet (5 mg total) by mouth daily 30 tablet 0    docusate sodium (COLACE) 100 mg capsule Take 1 capsule (100 mg total) by mouth 2 (two) times a day (Patient taking differently: Take 100 mg by mouth in the morning  ) 60 capsule 0    doxycycline (ADOXA) 100 MG tablet Take 1 tablet (100 mg total) by mouth 2 (two) times a day Take until you follow up postoperatively at [facility] 60 tablet 0    DULoxetine (CYMBALTA) 60 mg delayed release capsule TAKE 1 CAPSULE BY MOUTH  DAILY 90 capsule 1    flecainide (TAMBOCOR) 100 mg tablet TAKE 1 TABLET BY MOUTH TWICE DAILY 180 tablet 1    gabapentin (NEURONTIN) 100 mg capsule Take 1 capsule (100 mg total) by mouth in the morning 30 capsule 0    gabapentin (NEURONTIN) 300 mg capsule Take 1 capsule (300 mg total) by mouth daily at bedtime 30 capsule 0    methocarbamol (ROBAXIN) 750 mg tablet Take 1 tablet (750 mg total) by mouth every 6 (six) hours as needed for muscle spasms 30 tablet 0    metoprolol succinate (TOPROL-XL) 100 mg 24 hr tablet TAKE 1 TABLET BY MOUTH  DAILY 90 tablet 0    ondansetron (ZOFRAN) 4 mg tablet Take 1 tablet (4 mg total) by mouth every 8 (eight) hours as needed for nausea or vomiting 30 tablet 0    pantoprazole (PROTONIX) 40 mg tablet Take 1 tablet (40 mg total) by mouth 2 (two) times a day before meals 30 tablet 0    polyethylene glycol (MIRALAX) 17 g packet Take 17 g by mouth daily as needed (constipation) 30 each 0    pravastatin (PRAVACHOL) 40 mg tablet TAKE 1 TABLET BY MOUTH  DAILY 90 tablet 1    tamsulosin (FLOMAX) 0 4 mg TAKE 1 CAPSULE BY MOUTH  DAILY WITH DINNER 90 capsule 1    warfarin (COUMADIN) 5 mg tablet Take by mouth daily Take 2 5 mg (1/2 tablets) daily except Wednesday and Saturday take 5 mg daily  No current facility-administered medications for this visit  Allergies   Allergen Reactions    Morphine GI Intolerance    Vitamin K Other (See Comments)     On coumadin-patient sensitive to vitamin K amounts in meds etc        Review of Systems    Video Exam    There were no vitals filed for this visit  Physical Exam     {Time Spent:57942}    VIRTUAL VISIT DISCLAIMER      Pra[place]en Hill verbally agrees to participate in [employer]  Pt is aware that [employer] could be limited without vital signs or the ability to perform a full hands-on physical exam  Tian Soler understands he or the provider may request at any time to terminate the video visit and request the patient to seek care or treatment in person

## 2022-05-10 NOTE — ASSESSMENT & PLAN NOTE
Wt Readings from Last 3 Encounters:   05/10/22 114 kg (251 lb 5 2 oz)   05/03/22 114 kg (251 lb)   04/29/22 113 kg (250 lb 1 6 oz)     · Prior echo 04/25/2022:  EF 03%, Diastolic function: grade 2 pseudonormal relaxation  · Monitor I/O and daily weights  · Does not take outpatient diuretics  IV Lasix with minimal results during prior admission    Patient had received albumin to help with anasarca  · Fluid restriction, 2 g sodium

## 2022-05-11 ENCOUNTER — APPOINTMENT (INPATIENT)
Dept: RADIOLOGY | Facility: HOSPITAL | Age: 72
DRG: 674 | End: 2022-05-11
Payer: COMMERCIAL

## 2022-05-11 ENCOUNTER — PATIENT OUTREACH (OUTPATIENT)
Dept: FAMILY MEDICINE CLINIC | Facility: HOSPITAL | Age: 72
End: 2022-05-11

## 2022-05-11 PROBLEM — E88.09 HYPOALBUMINEMIA: Status: ACTIVE | Noted: 2022-05-11

## 2022-05-11 PROBLEM — E83.42 HYPOMAGNESEMIA: Status: ACTIVE | Noted: 2022-05-11

## 2022-05-11 LAB
ANION GAP SERPL CALCULATED.3IONS-SCNC: 10 MMOL/L (ref 4–13)
ANION GAP SERPL CALCULATED.3IONS-SCNC: 11 MMOL/L (ref 4–13)
BASOPHILS # BLD AUTO: 0.06 THOUSANDS/ΜL (ref 0–0.1)
BASOPHILS NFR BLD AUTO: 1 % (ref 0–1)
BUN SERPL-MCNC: 35 MG/DL (ref 5–25)
BUN SERPL-MCNC: 36 MG/DL (ref 5–25)
CALCIUM SERPL-MCNC: 7.9 MG/DL (ref 8.3–10.1)
CALCIUM SERPL-MCNC: 8.1 MG/DL (ref 8.3–10.1)
CHLORIDE SERPL-SCNC: 102 MMOL/L (ref 100–108)
CHLORIDE SERPL-SCNC: 103 MMOL/L (ref 100–108)
CO2 SERPL-SCNC: 26 MMOL/L (ref 21–32)
CO2 SERPL-SCNC: 27 MMOL/L (ref 21–32)
CREAT SERPL-MCNC: 4.99 MG/DL (ref 0.6–1.3)
CREAT SERPL-MCNC: 5 MG/DL (ref 0.6–1.3)
EOSINOPHIL # BLD AUTO: 0.2 THOUSAND/ΜL (ref 0–0.61)
EOSINOPHIL NFR BLD AUTO: 4 % (ref 0–6)
ERYTHROCYTE [DISTWIDTH] IN BLOOD BY AUTOMATED COUNT: 14.4 % (ref 11.6–15.1)
GFR SERPL CREATININE-BSD FRML MDRD: 10 ML/MIN/1.73SQ M
GFR SERPL CREATININE-BSD FRML MDRD: 10 ML/MIN/1.73SQ M
GLUCOSE SERPL-MCNC: 131 MG/DL (ref 65–140)
GLUCOSE SERPL-MCNC: 89 MG/DL (ref 65–140)
HCT VFR BLD AUTO: 22.3 % (ref 36.5–49.3)
HGB BLD-MCNC: 7.1 G/DL (ref 12–17)
IMM GRANULOCYTES # BLD AUTO: 0.01 THOUSAND/UL (ref 0–0.2)
IMM GRANULOCYTES NFR BLD AUTO: 0 % (ref 0–2)
INR PPP: 3.18 (ref 0.84–1.19)
LYMPHOCYTES # BLD AUTO: 1.54 THOUSANDS/ΜL (ref 0.6–4.47)
LYMPHOCYTES NFR BLD AUTO: 29 % (ref 14–44)
MCH RBC QN AUTO: 29 PG (ref 26.8–34.3)
MCHC RBC AUTO-ENTMCNC: 31.8 G/DL (ref 31.4–37.4)
MCV RBC AUTO: 91 FL (ref 82–98)
MONOCYTES # BLD AUTO: 0.59 THOUSAND/ΜL (ref 0.17–1.22)
MONOCYTES NFR BLD AUTO: 11 % (ref 4–12)
NEUTROPHILS # BLD AUTO: 2.91 THOUSANDS/ΜL (ref 1.85–7.62)
NEUTS SEG NFR BLD AUTO: 55 % (ref 43–75)
NRBC BLD AUTO-RTO: 0 /100 WBCS
PLATELET # BLD AUTO: 192 THOUSANDS/UL (ref 149–390)
PMV BLD AUTO: 10.1 FL (ref 8.9–12.7)
POTASSIUM SERPL-SCNC: 3.2 MMOL/L (ref 3.5–5.3)
POTASSIUM SERPL-SCNC: 3.7 MMOL/L (ref 3.5–5.3)
PROTHROMBIN TIME: 31.7 SECONDS (ref 11.6–14.5)
RBC # BLD AUTO: 2.45 MILLION/UL (ref 3.88–5.62)
SODIUM SERPL-SCNC: 139 MMOL/L (ref 136–145)
SODIUM SERPL-SCNC: 140 MMOL/L (ref 136–145)
WBC # BLD AUTO: 5.31 THOUSAND/UL (ref 4.31–10.16)

## 2022-05-11 PROCEDURE — 93005 ELECTROCARDIOGRAM TRACING: CPT

## 2022-05-11 PROCEDURE — 99223 1ST HOSP IP/OBS HIGH 75: CPT | Performed by: INTERNAL MEDICINE

## 2022-05-11 PROCEDURE — 94760 N-INVAS EAR/PLS OXIMETRY 1: CPT

## 2022-05-11 PROCEDURE — 99232 SBSQ HOSP IP/OBS MODERATE 35: CPT | Performed by: HOSPITALIST

## 2022-05-11 PROCEDURE — 85610 PROTHROMBIN TIME: CPT | Performed by: INTERNAL MEDICINE

## 2022-05-11 PROCEDURE — 80048 BASIC METABOLIC PNL TOTAL CA: CPT | Performed by: INTERNAL MEDICINE

## 2022-05-11 PROCEDURE — 72080 X-RAY EXAM THORACOLMB 2/> VW: CPT

## 2022-05-11 PROCEDURE — 97166 OT EVAL MOD COMPLEX 45 MIN: CPT

## 2022-05-11 PROCEDURE — 85025 COMPLETE CBC W/AUTO DIFF WBC: CPT | Performed by: INTERNAL MEDICINE

## 2022-05-11 PROCEDURE — 97163 PT EVAL HIGH COMPLEX 45 MIN: CPT

## 2022-05-11 RX ORDER — LIDOCAINE 50 MG/G
1 PATCH TOPICAL DAILY
Status: DISCONTINUED | OUTPATIENT
Start: 2022-05-12 | End: 2022-05-11

## 2022-05-11 RX ORDER — FUROSEMIDE 10 MG/ML
80 INJECTION INTRAMUSCULAR; INTRAVENOUS ONCE
Status: COMPLETED | OUTPATIENT
Start: 2022-05-11 | End: 2022-05-11

## 2022-05-11 RX ORDER — LIDOCAINE 50 MG/G
1 PATCH TOPICAL
Status: DISCONTINUED | OUTPATIENT
Start: 2022-05-11 | End: 2022-05-15

## 2022-05-11 RX ORDER — ONDANSETRON 2 MG/ML
4 INJECTION INTRAMUSCULAR; INTRAVENOUS EVERY 4 HOURS PRN
Status: DISCONTINUED | OUTPATIENT
Start: 2022-05-11 | End: 2022-06-04 | Stop reason: HOSPADM

## 2022-05-11 RX ORDER — POTASSIUM CHLORIDE 14.9 MG/ML
20 INJECTION INTRAVENOUS ONCE
Status: COMPLETED | OUTPATIENT
Start: 2022-05-11 | End: 2022-05-11

## 2022-05-11 RX ORDER — POTASSIUM CHLORIDE 20 MEQ/1
40 TABLET, EXTENDED RELEASE ORAL ONCE
Status: COMPLETED | OUTPATIENT
Start: 2022-05-11 | End: 2022-05-11

## 2022-05-11 RX ADMIN — ACETAMINOPHEN 650 MG: 325 TABLET, FILM COATED ORAL at 16:22

## 2022-05-11 RX ADMIN — GABAPENTIN 100 MG: 100 CAPSULE ORAL at 10:18

## 2022-05-11 RX ADMIN — ONDANSETRON 4 MG: 2 INJECTION INTRAMUSCULAR; INTRAVENOUS at 22:06

## 2022-05-11 RX ADMIN — POTASSIUM CHLORIDE 40 MEQ: 1500 TABLET, EXTENDED RELEASE ORAL at 05:58

## 2022-05-11 RX ADMIN — ACETAMINOPHEN 650 MG: 325 TABLET, FILM COATED ORAL at 03:03

## 2022-05-11 RX ADMIN — TAMSULOSIN HYDROCHLORIDE 0.4 MG: 0.4 CAPSULE ORAL at 16:16

## 2022-05-11 RX ADMIN — LIDOCAINE 5% 1 PATCH: 700 PATCH TOPICAL at 22:06

## 2022-05-11 RX ADMIN — METOPROLOL SUCCINATE 100 MG: 50 TABLET, EXTENDED RELEASE ORAL at 10:18

## 2022-05-11 RX ADMIN — PANTOPRAZOLE SODIUM 40 MG: 40 TABLET, DELAYED RELEASE ORAL at 16:15

## 2022-05-11 RX ADMIN — POTASSIUM CHLORIDE 20 MEQ: 200 INJECTION, SOLUTION INTRAVENOUS at 05:58

## 2022-05-11 RX ADMIN — PANTOPRAZOLE SODIUM 40 MG: 40 TABLET, DELAYED RELEASE ORAL at 06:19

## 2022-05-11 RX ADMIN — FUROSEMIDE 80 MG: 10 INJECTION, SOLUTION INTRAVENOUS at 13:29

## 2022-05-11 RX ADMIN — METHOCARBAMOL TABLETS 750 MG: 500 TABLET, COATED ORAL at 03:03

## 2022-05-11 RX ADMIN — DOCUSATE SODIUM 100 MG: 100 CAPSULE, LIQUID FILLED ORAL at 10:18

## 2022-05-11 RX ADMIN — DOXYCYCLINE 100 MG: 100 CAPSULE ORAL at 20:28

## 2022-05-11 RX ADMIN — DOXYCYCLINE 100 MG: 100 CAPSULE ORAL at 10:19

## 2022-05-11 RX ADMIN — DULOXETINE 60 MG: 30 CAPSULE, DELAYED RELEASE ORAL at 10:19

## 2022-05-11 RX ADMIN — PRAVASTATIN SODIUM 40 MG: 40 TABLET ORAL at 16:15

## 2022-05-11 RX ADMIN — GABAPENTIN 300 MG: 300 CAPSULE ORAL at 22:26

## 2022-05-11 RX ADMIN — METHOCARBAMOL TABLETS 750 MG: 500 TABLET, COATED ORAL at 22:26

## 2022-05-11 RX ADMIN — METHOCARBAMOL TABLETS 750 MG: 500 TABLET, COATED ORAL at 16:21

## 2022-05-11 RX ADMIN — AMLODIPINE BESYLATE 5 MG: 5 TABLET ORAL at 10:18

## 2022-05-11 NOTE — ASSESSMENT & PLAN NOTE
· Prescribed Coumadin  · INR 4 0 on 05/09  Outpatient provider had instructed patient to hold dose on 5/9 and 5/10  · INR on admission on 05/10 was 3 15    Continue hold of Coumadin, INR increased this AM  · INR goal 2-3

## 2022-05-11 NOTE — PHYSICAL THERAPY NOTE
PT eval   05/11/22 0945   PT Last Visit   PT Visit Date 05/11/22   Note Type   Note type Evaluation   Pain Assessment   Pain Assessment Tool Cho-Baker FACES   Cho-Baker FACES Pain Rating 4   Pain Location/Orientation Orientation: Right  (sub scpular area intermittent)   Restrictions/Precautions   Weight Bearing Precautions Per Order No   Braces or Orthoses C/S Collar   Other Precautions Multiple lines   Home Living   Type of [address] Two level;1/2 bath on main level   Home Equipment Walker   Additional Comments uses RW at home   Prior Function   Level of Story Independent with ADLs and functional mobility   Lives With Spouse   Receives Help From Family   ADL Assistance Independent   IADLs Needs assistance   Falls in the last 6 months 1 to 4   Vocational Retired   Comments not driving surrently   General   Additional Pertinent History recent cevical surgery ,infection, debridement, adm with JANEL; PMH+glomerulonephrosis, anemis, depression, CJF, OA B knees LE dvt, Great toe pain R   Family/Caregiver Present No   Cognition   Overall Cognitive Status WFL   Attention Within functional limits   Orientation Level Oriented X4   Memory Within functional limits   Following Commands Follows all commands and directions without difficulty   Subjective   Subjective Pt states R great toe still painful   RUE Assessment   RUE Assessment WFL   LUE Assessment   LUE Assessment WFL   RLE Assessment   RLE Assessment WFL   LLE Assessment   LLE Assessment WFL   Coordination   Movements are Fluid and Coordinated 1   Proprioception   RLE Proprioception Grossly intact   LLE Proprioception Grossly Intact   Bed Mobility   Rolling L 5  Supervision   Supine to Sit 5  Supervision   Transfers   Sit to Stand 5  Supervision   Additional items Increased time required   Stand to Sit 5  Supervision   Additional items Armrests   Stand pivot 5  Supervision   Additional items   (rw)   Ambulation/Elevation   Gait pattern Narrow AZRA;Short stride; WNL   Gait Assistance 6  Modified independent   Assistive Device Rolling walker   Distance 50'   Balance   Static Sitting Good   Dynamic Sitting Fair +   Static Standing Fair   Dynamic Standing Enrique Ray [id number]   Endurance Deficit   Endurance Deficit No   Activity Tolerance   Activity Tolerance Patient tolerated treatment well   Medical Staff Made Aware MIGUEL Berrios   Nurse Made Aware Carlos Alberto Wilson   Assessment   Prognosis Good   Assessment Pt able to mobilize in room with rw  No skilled needs at this time  Wishes to continue with home PT at d/c to progress form RW to no AD  Pt oob in chair after session needs in reach     Barriers to Discharge   (medical status)   Goals   Patient Goals go home   Plan   PT Frequency   (d/c PT)   Recommendation   PT Discharge Recommendation Home with home health rehabilitation   [address]   Turning in Bed Without Bedrails 4   Lying on Back to Sitting on Edge of Flat Bed 4   Moving Bed to Chair 4   Standing Up From Chair 4   Walk in Room 4   Climb 3-5 Stairs 3   Basic Mobility Inpatient Raw Score 23   Basic Mobility Standardized Score 50 88   Highest Level Of Mobility   JH-HLM Goal 7: Walk 25 feet or more   JH-HLM Highest Level of Mobility 7: Walk 25 feet or more   JH-HLM Goal Achieved Yes   Modified Mayra Scale   Modified [place] Scale 1   Renata Lie, PT

## 2022-05-11 NOTE — ASSESSMENT & PLAN NOTE
Wt Readings from Last 3 Encounters:   05/10/22 114 kg (251 lb 5 2 oz)   05/03/22 114 kg (251 lb)   04/29/22 113 kg (250 lb 1 6 oz)     · Prior echo 04/25/2022:  EF 87%, Diastolic function: grade 2 pseudonormal relaxation  · Monitor I/O and daily weights  · Does not take outpatient diuretics  IV Lasix with minimal results during prior admission    Patient had received albumin to help with anasarca  · Fluid restriction, 2 g sodium

## 2022-05-11 NOTE — CONSULTS
Filomena 71 y o  male MRN: [id number]  Unit/Bed#: -01 Encounter: [id number]    ASSESSMENT and PLAN:    67-year-old male, with no prior history of chronic kidney disease, factor V Leiden deficiency, DVT, chronic diastolic congestive heart failure, who underwent cervical fusion on March 79HG which was complicated by MRSA surgical site infection and osteomyelitis was subsequently developed acute kidney injury in April  Patient was readmitted due to concerns of nausea and worsening renal function  Acute kidney injury/worsening renal function  --baseline creatinine is less than 1 mg/dL  --developed acute kidney injury in April, which was suspected to possibly be related to acute tubular necrosis versus postinfectious glomerular nephritis  --his creatinine plateaued in the high threes upon discharge  --renal function now worsening creatinine at 5 mg/dL and currently raining stable at this time  --BUN is not significantly elevated  --renal ultrasound showed no evidence of hydronephrosis with no echogenicity  --serologies:  Hepatitis panel was nonreactive, complement C3 and C4 within normal limits, anti-double-stranded DNA was negative, ZAK was negative, anti-glomerular basement antibody was negative, ANCA titer were negative, serum protein electrophoresis/UPEP showed no monoclonal bands  --acid-base status are stable and potassium is actually on the low side  --having nausea but no other symptoms of uremia and the nausea could be related to others things  --no urgent indication for renal placement therapy  --discussed about a renal biopsy  Given his low hemoglobin and currently being on anticoagulation and given his prior serologies being negative there is low suspicion that the biopsy would change the overall management and could considered be high risk procedure    After discussion with them they would like to hold off on this procedure which I agree with   --check a PVR   Bladder scans thus far have been negative  --low hemoglobin Will slow renal recovery, transfuse for hemoglobin less than 7  --nephrotic range proteinuria with a urine protein creatinine ratio greater than 18, repeat was 16 5, though may not have been steady state  --furosemide 80 mg IV x1  --monitor input and output  --avoid contrast studies, NSAIDs  --no indication for renal ultrasound at this time  --no indication to repeat serologies  --outpatient nephrologist Dr Yesy Kitchen    Hypokalemia  --potassium replaced  --plan to give a dose of IV Lasix    Microscopic hematuria  --suspected to be related to a post infectious glomerular nephritis  --was seen by Urology  --negative serologies    MRSA surgical site infection, OM:  --cervical fusion 3/11/2022 c/b MRSA surgical site infection, osteomyelitis  --s/p debridement and washout 4/1/2022   --previously on vancomycin--> daptomycin course completed 4/29/2022  Remains on oral doxycycline    Diastolic congestive heart failure  --preserved ejection fraction of 55% with grade 2 diastolic impaired relaxation  --trial of IV diuretics    Factor V Leiden deficiency:   --hx of associated DVT  --on chronic coumadin for factor V Leiden and atrial fib    Anemia  --chronic  --recent iron study showed iron level 41, ferritin 521, iron saturation 20, TIBC 204  --hemoglobin trending down  --check hemolysis smear    SUMMARY OF RECOMMENDATIONS:    Please see above    HISTORY OF PRESENT ILLNESS:  Requesting Physician: Dmitri Ferguson MD  Reason for Consult:  Worsening renal function with recent acute kidney injury    Sarmad Adjutant is a 71 y o  male who was admitted to [place] [id number] [facility] for direct admission after presenting with worsening renal function nausea  A renal consultation is requested today for assistance in the management of worsening renal function and acute kidney injury    Patient had normal baseline creatinine prior to his last hospitalization in April we had developed acute kidney injury  This patient had a cervical fusion surgery back in March which was complicated by MRSA infection  He subsequent developed acute kidney injury in April and his creatinine eventually plateaued in the high threes upon discharge and now worsening greater than 5 mg/dL with associated nausea but no vomiting  Reports no chest pain or shortness of breath  He does have chronic swelling and as per his wife it is worse than before  Reports no metallic taste on his tongue he has no asterixis of his hands or feet  Reports no NSAID use  PAST MEDICAL HISTORY:  Past Medical History:   Diagnosis Date    Acute venous embolism and thrombosis of deep vessels of proximal lower extremity (HCC)     Last assessed: 5/18/15    Arthritis     Cellulitis     LE    Chronic kidney disease 3/2020    Acute Kidney Injury    CPAP (continuous positive airway pressure) dependence     Factor V Leiden ([address] )     Forgetfulness     Headache(784 0) 3/2022    Never till cervical  spine surgery    Heart murmur 3/2020    Told when in hospital   [facility] (hard of hearing)     Paroxysmal atrial fibrillation ([address] )     Last assessed: 11/2/15    Sleep apnea        PAST SURGICAL HISTORY:  Past Surgical History:   Procedure Laterality Date    BACK SURGERY      Lower    COLON SURGERY      COLONOSCOPY      INCISION AND DRAINAGE POSTERIOR SPINE N/A 4/1/2022    Procedure: Posterior cervical evacuation of postoperative collection and debridement with placement of drains C3-T1; Surgeon: Trisha Faria MD;  Location: [facility];  Service: Neurosurgery    NV ARTHRODESIS ANT INTERBODY MIN DISCECTOMY, CERVICAL BELOW C2 N/A 3/11/2022    Procedure: Anterior cervical discectomy and fixation fusion C5/6 and C6/7; Posterior cervical decompression and instrumented fusion C3-T1;   Surgeon: Trisha Faria MD;  Location: [facility];  Service: Neurosurgery   [facility] SURGERY  3/11/2022    Cervical myelopathy/ cervical fusion       ALLERGIES:  Allergies   Allergen Reactions    Morphine GI Intolerance    Vitamin K Other (See Comments)     On coumadin-patient sensitive to vitamin K amounts in meds etc        SOCIAL HISTORY:  Social History     Substance and Sexual Activity   Alcohol Use Not Currently    Alcohol/week: 0 0 standard drinks     Social History     Substance and Sexual Activity   Drug Use No     Social History     Tobacco Use   Smoking Status Never Smoker   Smokeless Tobacco Never Used       FAMILY HISTORY:  Family History   Problem Relation Age of Onset    Lung cancer Mother     Hearing loss Father     Heart attack Brother     Atrial fibrillation Brother     Emphysema Sister        MEDICATIONS:    Current Facility-Administered Medications:     acetaminophen (TYLENOL) tablet 650 mg, 650 mg, Oral, Q6H PRN, Jonelle Lou PA-C, 650 mg at 05/11/22 0303    amLODIPine (NORVASC) tablet 5 mg, 5 mg, Oral, Daily, Jonelle Lou PA-C, 5 mg at 05/11/22 1018    docusate sodium (COLACE) capsule 100 mg, 100 mg, Oral, Daily, Jonelle Lou PA-C, 100 mg at 05/11/22 1018    doxycycline hyclate (VIBRAMYCIN) capsule 100 mg, 100 mg, Oral, Q12H [facility], Jonelle Lou PA-C, 100 mg at 05/11/22 1019    DULoxetine (CYMBALTA) delayed release capsule 60 mg, 60 mg, Oral, Daily, Jonelle Lou PA-C, 60 mg at 05/11/22 1019    furosemide (LASIX) injection 80 mg, 80 mg, Intravenous, Once, Art López MD    gabapentin (NEURONTIN) capsule 100 mg, 100 mg, Oral, Daily, Jonelle Lou PA-C, 100 mg at 05/11/22 1018    gabapentin (NEURONTIN) capsule 300 mg, 300 mg, Oral, HS, Jonelle Lou PA-C, 300 mg at 05/10/22 2150    methocarbamol (ROBAXIN) tablet 750 mg, 750 mg, Oral, Q6H PRN, Melchor Jenkins PA-C, 750 mg at 05/11/22 0303    metoprolol succinate (TOPROL-XL) 24 hr tablet 100 mg, 100 mg, Oral, Daily, Jonelle Lou PA-C, 100 mg at 05/11/22 1018    pantoprazole (PROTONIX) EC tablet 40 mg, 40 mg, Oral, BID AC, Jonelle Lou PA-C, 40 mg at 05/11/22 0417   pravastatin (PRAVACHOL) tablet 40 mg, 40 mg, Oral, Daily With Dinner, Bill Linda PA-C    tamsulosin [facility]) capsule 0 4 mg, 0 4 mg, Oral, Daily With Dinner, Bill Linda PA-C    trimethobenzamide (TIGAN) IM injection 200 mg, 200 mg, Intramuscular, Q12H PRN, Bill Linda PA-C    REVIEW OF SYSTEMS:  Constitutional: Negative for fatigue, anorexia, fever, chills, diaphoresis  HENT: Negative for postnasal drip  Eyes: Negative for visual disturbance  Respiratory: Negative for cough, shortness of breath and wheezing  Cardiovascular: Negative for chest pain, palpitations but positive leg swelling  Gastrointestinal: Negative for abdominal pain, constipation, diarrhea, and vomiting  Positive for nausea  Genitourinary: No dysuria, hematuria  Endocrine: Negative for polyuria  Musculoskeletal: Negative for arthralgias, back pain and joint swelling  Skin: Negative for rash  Neurological: Negative for focal weakness, headaches, dizziness  Hematological: Negative for easy bruising or bleeding  Psychiatric/Behavioral: Negative for confusion and sleep disturbance  All the systems were reviewed and were negative except as documented on the HPI  PHYSICAL EXAM:  Current Weight: Weight - Scale: 114 kg (251 lb 5 2 oz)  First Weight: Weight - Scale: 114 kg (251 lb 5 2 oz)  Vitals:    05/10/22 2317 05/11/22 0247 05/11/22 0300 05/11/22 0845   BP: 146/62   (!) 181/83   Pulse: (!) 50   55   Resp: 16   17   Temp:    97 8 °F (36 6 °C)   TempSrc:  Oral     SpO2: 97%  97% 96%   Weight:           Intake/Output Summary (Last 24 hours) at 5/11/2022 1239  Last data filed at 5/10/2022 2151  Gross per 24 hour   Intake 480 ml   Output 250 ml   Net 230 ml     Physical Exam  Vitals and nursing note reviewed  Constitutional:       General: He is not in acute distress  Appearance: He is well-developed  He is not diaphoretic  HENT:      Head: Normocephalic and atraumatic  Mouth/Throat:      Mouth: Mucous membranes are dry  Eyes:      General: No scleral icterus  Pupils: Pupils are equal, round, and reactive to light  Cardiovascular:      Rate and Rhythm: Normal rate and regular rhythm  Heart sounds: Normal heart sounds  No murmur heard  No friction rub  No gallop  Pulmonary:      Effort: Pulmonary effort is normal  No respiratory distress  Breath sounds: Normal breath sounds  No wheezing or rales  Chest:      Chest wall: No tenderness  Abdominal:      General: Bowel sounds are normal  There is no distension  Palpations: Abdomen is soft  Tenderness: There is no abdominal tenderness  There is no rebound  Musculoskeletal:         General: Swelling present  Normal range of motion  Cervical back: Normal range of motion and neck supple  Right lower leg: Edema present  Left lower leg: Edema present  Skin:     General: Skin is dry  Coloration: Skin is pale  Findings: No rash  Neurological:      Mental Status: He is alert and oriented to person, place, and time             Invasive Devices:      Lab Results:   Results from last 7 days   Lab Units 05/11/22  0334 05/10/22  1950 05/10/22  1949   WBC Thousand/uL 5 31  --  6 08   HEMOGLOBIN g/dL 7 1*  --  7 9*   HEMATOCRIT % 22 3*  --  24 8*   PLATELETS Thousands/uL 192  --  237   POTASSIUM mmol/L 3 2* 2 8*  --    CHLORIDE mmol/L 102 100  --    CO2 mmol/L 26 27  --    BUN mg/dL 35* 35*  --    CREATININE mg/dL 5 00* 5 04*  --    CALCIUM mg/dL 8 1* 7 9*  --    MAGNESIUM mg/dL  --  1 4*  --    PHOSPHORUS mg/dL  --  5 4*  --    ALK PHOS U/L  --  94  --    ALT U/L  --  10*  --    AST U/L  --  14  --

## 2022-05-11 NOTE — PLAN OF CARE
Problem: PAIN - ADULT  Goal: Verbalizes/displays adequate comfort level or baseline comfort level  Description: Interventions:  - Encourage patient to monitor pain and request assistance  - Assess pain using appropriate pain scale  - Administer analgesics based on type and severity of pain and evaluate response  - Implement non-pharmacological measures as appropriate and evaluate response  - Consider cultural and social influences on pain and pain management  - Notify physician/advanced practitioner if interventions unsuccessful or patient reports new pain  5/11/2022 1502 by Miguelito Bates RN  Outcome: Progressing  5/11/2022 1501 by Miguelito Bates RN  Outcome: Progressing     Problem: INFECTION - ADULT  Goal: Absence or prevention of progression during hospitalization  Description: INTERVENTIONS:  - Assess and monitor for signs and symptoms of infection  - Monitor lab/diagnostic results  - Monitor all insertion sites, i e  indwelling lines, tubes, and drains  - Monitor endotracheal if appropriate and nasal secretions for changes in amount and color  - [place] appropriate cooling/warming therapies per order  - Administer medications as ordered  - Instruct and encourage patient and family to use good hand hygiene technique  - Identify and instruct in appropriate isolation precautions for identified infection/condition  5/11/2022 1502 by Miguelito Bates RN  Outcome: Progressing  5/11/2022 1501 by Miguelito Bates RN  Outcome: Progressing     Problem: SAFETY ADULT  Goal: Patient will remain free of falls  Description: INTERVENTIONS:  - Educate patient/family on patient safety including physical limitations  - Instruct patient to call for assistance with activity   - Consult OT/PT to assist with strengthening/mobility   - Keep Call bell within reach  - Keep bed low and locked with side rails adjusted as appropriate  - Keep care items and personal belongings within reach  - Initiate and maintain comfort rounds  - Make Fall Risk Sign visible to staff  - Offer Toileting every 2 Hours, in advance of need  - Initiate/Maintain alarm  - Obtain necessary fall risk management equipment:   - Apply yellow socks and bracelet for high fall risk patients  - Consider moving patient to room near nurses station  5/11/2022 1502 by Loraine Barger RN  Outcome: Progressing  5/11/2022 1501 by Loraine Barger RN  Outcome: Progressing  Goal: Maintain or return to baseline ADL function  Description: INTERVENTIONS:  -  Assess patient's ability to carry out ADLs; assess patient's baseline for ADL function and identify physical deficits which impact ability to perform ADLs (bathing, care of mouth/teeth, toileting, grooming, dressing, etc )  - Assess/evaluate cause of self-care deficits   - Assess range of motion  - Assess patient's mobility; develop plan if impaired  - Assess patient's need for assistive devices and provide as appropriate  - Encourage maximum independence but intervene and supervise when necessary  - Involve family in performance of ADLs  - Assess for home care needs following discharge   - Consider OT consult to assist with ADL evaluation and planning for discharge  - Provide patient education as appropriate  5/11/2022 1502 by Loraine Barger RN  Outcome: Progressing  5/11/2022 1501 by Loraine Barger RN  Outcome: Progressing  Goal: Maintains/Returns to pre admission functional level  Description: INTERVENTIONS:  - Perform BMAT or MOVE assessment daily    - Set and communicate daily mobility goal to care team and patient/family/caregiver     - Collaborate with rehabilitation services on mobility goals if consulted  - Out of bed for toileting  - Record patient progress and toleration of activity level   5/11/2022 1502 by Loraine Barger RN  Outcome: Progressing  5/11/2022 1501 by Loraine Barger RN  Outcome: Progressing     Problem: DISCHARGE PLANNING  Goal: Discharge to home or other facility with appropriate resources  Description: INTERVENTIONS:  - Identify barriers to discharge w/patient and caregiver  - Arrange for needed discharge resources and transportation as appropriate  - Identify discharge learning needs (meds, wound care, etc )  - Arrange for interpretive services to assist at discharge as needed  - Refer to Case Management Department for coordinating discharge planning if the patient needs post-hospital services based on physician/advanced practitioner order or complex needs related to functional status, cognitive ability, or social support system  5/11/2022 1502 by Rhina Charles RN  Outcome: Progressing  5/11/2022 1501 by Rhina Charles RN  Outcome: Progressing     Problem: Knowledge Deficit  Goal: Patient/family/caregiver demonstrates understanding of disease process, treatment plan, medications, and discharge instructions  Description: Complete learning assessment and assess knowledge base    Interventions:  - Provide teaching at level of understanding  - Provide teaching via preferred learning methods  5/11/2022 1502 by Rhina Charles RN  Outcome: Progressing  5/11/2022 1501 by Rhina Charles RN  Outcome: Progressing     Problem: MOBILITY - ADULT  Goal: Maintain or return to baseline ADL function  Description: INTERVENTIONS:  -  Assess patient's ability to carry out ADLs; assess patient's baseline for ADL function and identify physical deficits which impact ability to perform ADLs (bathing, care of mouth/teeth, toileting, grooming, dressing, etc )  - Assess/evaluate cause of self-care deficits   - Assess range of motion  - Assess patient's mobility; develop plan if impaired  - Assess patient's need for assistive devices and provide as appropriate  - Encourage maximum independence but intervene and supervise when necessary  - Involve family in performance of ADLs  - Assess for home care needs following discharge   - Consider OT consult to assist with ADL evaluation and planning for discharge  - Provide patient education as appropriate  5/11/2022 1502 by Julia Fermin RN  Outcome: Progressing  5/11/2022 1501 by Julia Fermin RN  Outcome: Progressing  Goal: Maintains/Returns to pre admission functional level  Description: INTERVENTIONS:  - Perform BMAT or MOVE assessment daily    - Set and communicate daily mobility goal to care team and patient/family/caregiver     - Collaborate with rehabilitation services on mobility goals if consulted  - Out of bed for toileting  - Record patient progress and toleration of activity level   5/11/2022 1502 by Julia Fermin RN  Outcome: Progressing  5/11/2022 1501 by Julia Fermin RN  Outcome: Progressing     Problem: Prexisting or High Potential for Compromised Skin Integrity  Goal: Skin integrity is maintained or improved  Description: INTERVENTIONS:  - Identify patients at risk for skin breakdown  - Assess and monitor skin integrity  - Assess and monitor nutrition and hydration status  - Monitor labs   - Assess for incontinence   - Turn and reposition patient  - Assist with mobility/ambulation  - Relieve pressure over bony prominences  - Avoid friction and shearing  - Provide appropriate hygiene as needed including keeping skin clean and dry  - Evaluate need for skin moisturizer/barrier cream  - Collaborate with interdisciplinary team   - Patient/family teaching  - Consider wound care consult   5/11/2022 1502 by Julia Fermin RN  Outcome: Progressing  5/11/2022 1501 by Julia Fermin RN  Outcome: Progressing

## 2022-05-11 NOTE — ASSESSMENT & PLAN NOTE
· Magnesium 1 4 on admission  · Ordered 1 g IV magnesium   · Continue to monitor and replete as needed

## 2022-05-11 NOTE — CONSULTS
Consultation - Infectious Disease   Tawana Cox 71 y o  male MRN: [id number]  Unit/Bed#: -01 Encounter: [id number]      Assessment/Plan   1  Post-op wound infection/osteomyelitis cervical spine/arf    I do not feel that the infection or the doxcycyline is contributing to his worsening renal function at this time  I think it is safe to follow Dr Keny Horne recommendation that we continue the doxycyline for 2 more weeks  D/c 5/24  No need for further imaging at this point in time  Last ESR was 110, but was down in the 50's, may be due to kidney disease       - continue Doxycyline 100 mg bid until 5/24  - monitor for signs of worsening infection  - renal failure per renal  - check esr with next blood work     D/W family     History of Present Illness   Physician Requesting Consult: Olu David MD  Reason for Consult / Principal Problem: MRSA post op wound infection    HPI: Tawana Cox is a 71y o  year old male with AFIB, HTN, Factor V Leiden deficiency on Eliquis  Patient with a complicated recent PMHx  Under went  Cervical fusion surgery  Complicated by wound infection, s/p washout, cultures + MRSA  Patient has had IV vancomycin and had to be hospitalized for ARF felt to be post-infectious and ,abx were changed to Daptomycin for a week, and then doxycycline which he has been on for the past 2 weeks  He has some nausea but does not feel it is the Doxycycline  Clara Campbell he was seen by Nani Matson and recommended 2 more weeks of Doxycycline for a total of 10 weeks of abx  Yesterday he went for labs as well, and his CR was up to over 5 0 and he was told to come to hospital   He states he has been fatigued and had some mild nausea but otherwise okay  He has no headaches, chest pains, cough, abdominal pains, ,fevers, diarrhea  There has been no drainage from his incision  In the ER he was afebrile 97 8, and he had a normal WBC at 5 31   UA had innumerable RBC's, only 10-20 WBC's    His cr was 5 1          Inpatient consult to Infectious Diseases  Consult performed by: Daljit Tracy MD  Consult ordered by: Luis Carlos Sullivan PA-C          ROS: 12 systems reviewed, remainder is neg  Historical Information   Past Medical History:   Diagnosis Date    Acute venous embolism and thrombosis of deep vessels of proximal lower extremity (HCC)     Last assessed: 5/18/15    Arthritis     Cellulitis     LE    Chronic kidney disease 3/2020    Acute Kidney Injury    CPAP (continuous positive airway pressure) dependence     Factor V Leiden ([address] )     Forgetfulness     Headache(784 0) 3/2022    Never till cervical  spine surgery    Heart murmur 3/2020    Told when in hospital   [facility], [place], operated by [facility] (hard of hearing)     Paroxysmal atrial fibrillation ([address] )     Last assessed: 11/2/15    Sleep apnea      Past Surgical History:   Procedure Laterality Date    BACK SURGERY      Lower    COLON SURGERY      COLONOSCOPY      INCISION AND DRAINAGE POSTERIOR SPINE N/A 4/1/2022    Procedure: Posterior cervical evacuation of postoperative collection and debridement with placement of drains C3-T1; Surgeon: Britt Skiff, MD;  Location: [facility];  Service: Neurosurgery    [place] ARTHRODESIS ANT INTERBODY MIN DISCECTOMY, CERVICAL BELOW C2 N/A 3/11/2022    Procedure: Anterior cervical discectomy and fixation fusion C5/6 and C6/7; Posterior cervical decompression and instrumented fusion C3-T1;   Surgeon: Britt Skiff, MD;  Location[facility];  Service: Neurosurgery    SPINE SURGERY  3/11/2022    Cervical myelopathy/ cervical fusion     Social History   Social History     Substance and Sexual Activity   Alcohol Use Not Currently    Alcohol/week: 0 0 standard drinks     Social History     Substance and Sexual Activity   Drug Use No     Social History     Tobacco Use   Smoking Status Never Smoker   Smokeless Tobacco Never Used     Family History   Problem Relation Age of Onset    Lung cancer Mother     Hearing loss Father  Heart attack Brother     Atrial fibrillation Brother     Emphysema Sister        Meds/Allergies   MEDS: reviewed       Current Facility-Administered Medications:     acetaminophen (TYLENOL) tablet 650 mg, 650 mg, Oral, Q6H PRN, Jonelle Lou PA-C, 650 mg at 05/11/22 0303    amLODIPine (NORVASC) tablet 5 mg, 5 mg, Oral, Daily, Jonelle Lou PA-C, 5 mg at 05/11/22 1018    docusate sodium (COLACE) capsule 100 mg, 100 mg, Oral, Daily, Jonelle Lou PA-C, 100 mg at 05/11/22 1018    doxycycline hyclate (VIBRAMYCIN) capsule 100 mg, 100 mg, Oral, Q12H [address], Jonelle Lou PA-C, 100 mg at 05/11/22 1019    DULoxetine (CYMBALTA) delayed release capsule 60 mg, 60 mg, Oral, Daily, Jonelle Lou PA-C, 60 mg at 05/11/22 1019    gabapentin (NEURONTIN) capsule 100 mg, 100 mg, Oral, Daily, Jonelle Lou PA-C, 100 mg at 05/11/22 1018    gabapentin (NEURONTIN) capsule 300 mg, 300 mg, Oral, HS, Jonelle Lou PA-C, 300 mg at 05/10/22 2150    methocarbamol (ROBAXIN) tablet 750 mg, 750 mg, Oral, Q6H PRN, Nelson Burnett PA-C, 750 mg at 05/11/22 0303    metoprolol succinate (TOPROL-XL) 24 hr tablet 100 mg, 100 mg, Oral, Daily, Jonelle Lou PA-C, 100 mg at 05/11/22 1018    pantoprazole (PROTONIX) EC tablet 40 mg, 40 mg, Oral, BID AC, Jonelle Lou PA-C, 40 mg at 05/11/22 9566    pravastatin (PRAVACHOL) tablet 40 mg, 40 mg, Oral, Daily With Dinner, Nelson Burnett PA-C    tamsulosin (FLOMAX) capsule 0 4 mg, 0 4 mg, Oral, Daily With Dinner, Nelson Burnett PA-C    trimethobenzamide (TIGAN) IM injection 200 mg, 200 mg, Intramuscular, Q12H PRN, Nelson Burnett PA-C    Allergies   Allergen Reactions    Morphine GI Intolerance    Vitamin K Other (See Comments)     On coumadin-patient sensitive to vitamin K amounts in meds etc          Intake/Output Summary (Last 24 hours) at 5/11/2022 1045  Last data filed at 5/10/2022 2151  Gross per 24 hour   Intake 480 ml   Output 250 ml   Net 230 ml       PE:  GEN: nad in neck collar  VSS, Tmax: 97 8, other vss  HEENT: anicteric, EOMI  NECK: neck collar in place, wound is clean per wife's report and has been closed for some time now  CARDIAC: rrr s1s2 no murmurs  LUNGS: cta bilaterally no wheezes  ABDOMEN: soft nt/nd + BS  EXTREMITIES: no edema  SKIN: no rashes  NEURO: grossly non-focal  : no thompson catheter  PSYCH: nl affect     Invasive Devices:   Peripheral IV 05/11/22 Dorsal (posterior); Right Hand (Active)           Lab Results:   Admission on 05/10/2022   Component Date Value    Sodium 05/10/2022 137     Potassium 05/10/2022 2 8 (A)    Chloride 05/10/2022 100     CO2 05/10/2022 27     ANION GAP 05/10/2022 10     BUN 05/10/2022 35 (A)    Creatinine 05/10/2022 5 04 (A)    Glucose 05/10/2022 166 (A)    Calcium 05/10/2022 7 9 (A)    Corrected Calcium 05/10/2022 9 4     AST 05/10/2022 14     ALT 05/10/2022 10 (A)    Alkaline Phosphatase 05/10/2022 94     Total Protein 05/10/2022 6 2 (A)    Albumin 05/10/2022 2 1 (A)    Total Bilirubin 05/10/2022 0 40     eGFR 05/10/2022 10     Blood Culture 05/10/2022 Received in Microbiology Lab  Culture in Progress   Blood Culture 05/10/2022 Received in Microbiology Lab  Culture in Progress       Protime 05/10/2022 31 4 (A)    INR 05/10/2022 3 15 (A)    Magnesium 05/10/2022 1 4 (A)    Phosphorus 05/10/2022 5 4 (A)    CRP 05/10/2022 8 2 (A)    Sed Rate 05/10/2022 105 (A)    LACTIC ACID 05/10/2022 1 4     hs TnI 0hr 05/10/2022 10     Color, UA 05/10/2022 Yellow     Clarity, UA 05/10/2022 Clear     Specific Gravity, UA 05/10/2022 1 020     pH, UA 05/10/2022 6 0     Leukocytes, UA 05/10/2022 Negative     Nitrite, UA 05/10/2022 Negative     Protein, UA 05/10/2022 >=300 (A)    Glucose, UA 05/10/2022 100 (1/10%) (A)    Ketones, UA 05/10/2022 Negative     Urobilinogen, UA 05/10/2022 0 2     Bilirubin, UA 05/10/2022 Negative     Blood, UA 05/10/2022 Large (A)    WBC 05/10/2022 6 08     RBC 05/10/2022 2 73 (A)    Hemoglobin 05/10/2022 7 9 (A)    Hematocrit 05/10/2022 24 8 (A)    MCV 05/10/2022 91     MCH 05/10/2022 28 9     MCHC 05/10/2022 31 9     RDW 05/10/2022 14 2     MPV 05/10/2022 10 2     Platelets 04/86/6209 237     nRBC 05/10/2022 0     Neutrophils Relative 05/10/2022 66     Immat GRANS % 05/10/2022 0     Lymphocytes Relative 05/10/2022 20     Monocytes Relative 05/10/2022 10     Eosinophils Relative 05/10/2022 3     Basophils Relative 05/10/2022 1     Neutrophils Absolute 05/10/2022 4 04     Immature Grans Absolute 05/10/2022 0 02     Lymphocytes Absolute 05/10/2022 1 23     Monocytes Absolute 05/10/2022 0 59     Eosinophils Absolute 05/10/2022 0 16     Basophils Absolute 05/10/2022 0 04     RBC, UA 05/10/2022 Innumerable (A)    WBC, UA 05/10/2022 10-20 (A)    Epithelial Cells 05/10/2022 Occasional     Bacteria, UA 05/10/2022 Occasional     Hyaline Casts, UA 05/10/2022 2-4 (A)    Sodium 05/11/2022 139     Potassium 05/11/2022 3 2 (A)    Chloride 05/11/2022 102     CO2 05/11/2022 26     ANION GAP 05/11/2022 11     BUN 05/11/2022 35 (A)    Creatinine 05/11/2022 5 00 (A)    Glucose 05/11/2022 89     Calcium 05/11/2022 8 1 (A)    eGFR 05/11/2022 10     WBC 05/11/2022 5 31     RBC 05/11/2022 2 45 (A)    Hemoglobin 05/11/2022 7 1 (A)    Hematocrit 05/11/2022 22 3 (A)    MCV 05/11/2022 91     MCH 05/11/2022 29 0     MCHC 05/11/2022 31 8     RDW 05/11/2022 14 4     MPV 05/11/2022 10 1     Platelets 03/50/6795 192     nRBC 05/11/2022 0     Neutrophils Relative 05/11/2022 55     Immat GRANS % 05/11/2022 0     Lymphocytes Relative 05/11/2022 29     Monocytes Relative 05/11/2022 11     Eosinophils Relative 05/11/2022 4     Basophils Relative 05/11/2022 1     Neutrophils Absolute 05/11/2022 2 91     Immature Grans Absolute 05/11/2022 0 01     Lymphocytes Absolute 05/11/2022 1 54     Monocytes Absolute 05/11/2022 0 59     Eosinophils Absolute 05/11/2022 0 20     Basophils Absolute 05/11/2022 0 06     Protime 05/11/2022 31 7 (A)    INR 05/11/2022 3 18 (A)     Imaging Studies: I have personally reviewed pertinent reports  EKG, Pathology, and Other Studies: I have personally reviewed pertinent reports  Culture  Lab Results   Component Value Date    BLOODCX Received in Microbiology Lab  Culture in Progress  05/10/2022    BLOODCX Received in Microbiology Lab  Culture in Progress  05/10/2022    BLOODCX No Growth After 5 Days  03/31/2022    BLOODCX No Growth After 5 Days   03/31/2022     Lab Results   Component Value Date    WOUNDCULT (A) 03/31/2022     2+ Growth of Methicillin Resistant Staphylococcus aureus     No results found for: URINECX  No results found for: SPUTUMCULTUR    Principal Problem:    JANEL (acute kidney injury) ([facility] [address] )  Active Problems:    Essential hypertension    WILL (obstructive sleep apnea)    Factor V Leiden mutation (HCC)    Depression, major, single episode, moderate (HCC)    Paroxysmal A-fib (HCC)    Mixed hyperlipidemia    Weakness    Anemia    Surgical site infection    Glomerulonephritis    Chronic diastolic (congestive) heart failure (HCC)    Hypokalemia    Hypoalbuminemia    Hypomagnesemia

## 2022-05-11 NOTE — ASSESSMENT & PLAN NOTE
· Creatinine on admission: 5 04  · Direct admit due to rising creatinine outpatient  · Bilateral lower extremity 2+ pitting edema  · Prior admission: admitted on 04/22,  Discharged on 04/29 with creatinine 3 85  · Believed to be due to volume overload, postinfectious GN, Staph aureus bacteremia  · Renal biopsy was not performed  · Baseline prior to this event was 0 6-0 9  · Fluid restriction, monitor I/O  · Avoid nephrotoxins, hypotension, IV contrast  · Consult nephrology - await recommendations  No clear indications for emergency HD at this time

## 2022-05-11 NOTE — PLAN OF CARE
Problem: PAIN - ADULT  Goal: Verbalizes/displays adequate comfort level or baseline comfort level  Description: Interventions:  - Encourage patient to monitor pain and request assistance  - Assess pain using appropriate pain scale  - Administer analgesics based on type and severity of pain and evaluate response  - Implement non-pharmacological measures as appropriate and evaluate response  - Consider cultural and social influences on pain and pain management  - Notify physician/advanced practitioner if interventions unsuccessful or patient reports new pain  Outcome: Progressing     Problem: INFECTION - ADULT  Goal: Absence or prevention of progression during hospitalization  Description: INTERVENTIONS:  - Assess and monitor for signs and symptoms of infection  - Monitor lab/diagnostic results  - Monitor all insertion sites, i e  indwelling lines, tubes, and drains  - Monitor endotracheal if appropriate and nasal secretions for changes in amount and color  - Matheny appropriate cooling/warming therapies per order  - Administer medications as ordered  - Instruct and encourage patient and family to use good hand hygiene technique  - Identify and instruct in appropriate isolation precautions for identified infection/condition  Outcome: Progressing     Problem: SAFETY ADULT  Goal: Patient will remain free of falls  Description: INTERVENTIONS:  - Educate patient/family on patient safety including physical limitations  - Instruct patient to call for assistance with activity   - Consult OT/PT to assist with strengthening/mobility   - Keep Call bell within reach  - Keep bed low and locked with side rails adjusted as appropriate  - Keep care items and personal belongings within reach  - Initiate and maintain comfort rounds  - Make Fall Risk Sign visible to staff  - Offer Toileting every 2 Hours, in advance of need  - Initiate/Maintain bed alarm  - Obtain necessary fall risk management equipment:   - Apply yellow socks and bracelet for high fall risk patients  - Consider moving patient to room near nurses station  Outcome: Progressing  Goal: Maintain or return to baseline ADL function  Description: INTERVENTIONS:  -  Assess patient's ability to carry out ADLs; assess patient's baseline for ADL function and identify physical deficits which impact ability to perform ADLs (bathing, care of mouth/teeth, toileting, grooming, dressing, etc )  - Assess/evaluate cause of self-care deficits   - Assess range of motion  - Assess patient's mobility; develop plan if impaired  - Assess patient's need for assistive devices and provide as appropriate  - Encourage maximum independence but intervene and supervise when necessary  - Involve family in performance of ADLs  - Assess for home care needs following discharge   - Consider OT consult to assist with ADL evaluation and planning for discharge  - Provide patient education as appropriate  Outcome: Progressing  Goal: Maintains/Returns to pre admission functional level  Description: INTERVENTIONS:  - Perform BMAT or MOVE assessment daily    - Set and communicate daily mobility goal to care team and patient/family/caregiver  - Collaborate with rehabilitation services on mobility goals if consulted  - Perform Range of Motion 3 times a day  - Reposition patient every 2 hours    - Dangle patient 3 times a day  - Stand patient 3 times a day  - Ambulate patient 3 times a day  - Out of bed to chair 3 times a day   - Out of bed for meals 3 times a day  - Out of bed for toileting  - Record patient progress and toleration of activity level   Outcome: Progressing     Problem: DISCHARGE PLANNING  Goal: Discharge to home or other facility with appropriate resources  Description: INTERVENTIONS:  - Identify barriers to discharge w/patient and caregiver  - Arrange for needed discharge resources and transportation as appropriate  - Identify discharge learning needs (meds, wound care, etc )  - Arrange for interpretive services to assist at discharge as needed  - Refer to Case Management Department for coordinating discharge planning if the patient needs post-hospital services based on physician/advanced practitioner order or complex needs related to functional status, cognitive ability, or social support system  Outcome: Progressing     Problem: Knowledge Deficit  Goal: Patient/family/caregiver demonstrates understanding of disease process, treatment plan, medications, and discharge instructions  Description: Complete learning assessment and assess knowledge base    Interventions:  - Provide teaching at level of understanding  - Provide teaching via preferred learning methods  Outcome: Progressing

## 2022-05-11 NOTE — ASSESSMENT & PLAN NOTE
· Albumin 2 1 on admission   · Patient with bilateral lower extremity swelling    Suspect anasarca   · Ordered 1 dose of IV albumin  · Nephrology consulted

## 2022-05-11 NOTE — ASSESSMENT & PLAN NOTE
· S/p cervical fusion performed by Dr Jocelyne Melendrez on 8/64/88, complicated by MRSA infection  · Completed 24 days of IV vancomycin, transitioned to daptomycin on 04/24-4/29    Discharged home on doxycycline 100 mg b i d    · Continue doxycycline  · apprec ID, not felt to be contributing to PIGN

## 2022-05-11 NOTE — CONSULTS
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; seen by Nephrology at that time who recommended fluid restriction and albumin; creat on discharge was 3 8  · Creat on admit 5 0 and repeat this AM is the same  · Fluid restriction in place per SLIM; Nephrology consulted    Paroxysmal afib/aflutter  · Hx of aflutter ablation 2015  · Home meds: flecainide 100 mg BID, Toprol  mg QD  · EKG this AM is sinus bradycardia with 1st degree AV block and RBBB rate of 56  · In setting of him being on flecainide and having JANEL, QRS duration was assessed; he has hx of RBBB and QRS in the past has been <200; this AM his QRS is 218, we will hold flecainide at this time and can reconsider starting prior to d/c depending on his renal function    Hypomagnesemia  · Mag 1 4 on admit; repleted by SLIM  · Mag this AM is pending; goal 2 0 in setting of PAF    Hypokalemia  · K+ 2 8 on admit; repleted by SLIM  · K+ this AM 3 2, continue to replete; goal 4 0 in setting of PAF    B/L LE edema  · Chronic B/L LE edema noted by Dr Edwardo Hernandez in last office note  · TTE from 4/2022: EF 55%; no WMA; grade 2DD; RV normal size and function; mild MR/TR  · Likely secondary to hypoalbuminemia (2 1 on admit); IV lasix trialed last admit with minimal response  · IV albumin ordered by SLIM    Essential HTN  · SBP averaging 140's  · Home meds: amlodipine 5 mg QD, Toprol  mg QD    HLD  · Pravastatin 40 mg QD    Factor V Leiden  · On Coumadin; INR on admit 3 1    WILL  · Compliant with CPAP    Surgical site infection- s/p cervical fusion 9/88/3510 complicated by MRSA      Plan/Recommendations:  · In setting of him being on flecainide and having JANEL, QRS duration was assessed; he has hx of RBBB and QRS in the past has been <200; this AM his QRS is 218, we will hold flecainide at this time and can reconsider starting prior to d/c depending on his renal function  · Continue remainder of cardiac meds  · Coumadin dosing per SLIM team        _________________________________________________________________      History of Present Illness   HPI: Juan Manuel Reyes is a 71y o  year old male with PMH of anemia, chronic B/L LE edema, Factor V Leiden (coumadin), HLD, HTN, WILL, and Afib (Coumadin)  He follows with cardiologist Dr Gallito Valentino and was last seen 11/2021  Patient comes to the ED at the direction of his outpatient nephrologist secondary to elevated creatinine noted on outpatient labs  Of note, the patient was recently admitted 4/22-4/29 with JANEL 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 after MRSA infection s/p cervical fusion surgery  He was seen by Nephrology at that time who recommended fluid restriction and albumin,creat on discharge was 3 8  Creatinine noted to be 5 0 on outpatient labs and outpatient nephrology recommended patient be admitted for further evaluation  Fluid restriction was placed by internal medicine team and Nephrology has been consulted  Secondary to JANEL and admission in the presence of the patient being on flecainide for paroxysmal AFib, Cardiology is consulted to assist with flecainide management  EKG reviewed personally: EKG this AM is sinus bradycardia with 1st degree AV block and RBBB rate of 56          Telemetry reviewed personally:  Sinus rhythm with first-degree AV block and RBBB rates in the 50's-60's        Review of Systems   Constitutional: Positive for malaise/fatigue  Negative for chills and fever  HENT: Negative for congestion      Cardiovascular: Negative for chest pain, dyspnea on exertion, leg swelling, orthopnea and palpitations  Respiratory: Negative for cough, shortness of breath (no SOB at rest) and wheezing  Endocrine: Negative  Hematologic/Lymphatic: Negative  Skin: Negative  Musculoskeletal: Negative  Gastrointestinal: Positive for nausea  Negative for bloating, abdominal pain and vomiting  Genitourinary: Negative  Neurological: Negative for dizziness and light-headedness  Psychiatric/Behavioral: Negative  All other systems reviewed and are negative  Historical Information   Past Medical History:   Diagnosis Date    Acute venous embolism and thrombosis of deep vessels of proximal lower extremity (HCC)     Last assessed: 5/18/15    Arthritis     Cellulitis     LE    Chronic kidney disease 3/2020    Acute Kidney Injury    CPAP (continuous positive airway pressure) dependence     Factor V Leiden ([address] )     Forgetfulness     Headache(784 0) 3/2022    Never till cervical  spine surgery    Heart murmur 3/2020    Told when in hospital   [facility] (hard of hearing)     Paroxysmal atrial fibrillation ([facility] [address] )     Last assessed: 11/2/15    Sleep apnea      Past Surgical History:   Procedure Laterality Date    BACK SURGERY      Lower    COLON SURGERY      COLONOSCOPY      INCISION AND DRAINAGE POSTERIOR SPINE N/A 4/1/2022    Procedure: Posterior cervical evacuation of postoperative collection and debridement with placement of drains C3-T1; Surgeon: Trisha Cosme MD;  [facility];  Service: Neurosurgery    SD ARTHRODESIS ANT INTERBODY MIN DISCECTOMY, CERVICAL BELOW C2 N/A 3/11/2022    Procedure: Anterior cervical discectomy and fixation fusion C5/6 and C6/7; Posterior cervical decompression and instrumented fusion C3-T1;   Surgeon: Trisha Cosme MD;  [facility];  Service: Neurosurgery    SPINE SURGERY  3/11/2022    Cervical myelopathy/ cervical fusion     Social History     Substance and Sexual Activity   Alcohol Use Not Currently    Alcohol/week: 0 0 standard drinks     Social History     Substance and Sexual Activity   Drug Use No     Social History     Tobacco Use   Smoking Status Never Smoker   Smokeless Tobacco Never Used     Family History:   Family History   Problem Relation Age of Onset    Lung cancer Mother     Hearing loss Father     Heart attack Brother     Atrial fibrillation Brother     Emphysema Sister        Meds/Allergies   current meds:   Current Facility-Administered Medications   Medication Dose Route Frequency    acetaminophen (TYLENOL) tablet 650 mg  650 mg Oral Q6H PRN    amLODIPine (NORVASC) tablet 5 mg  5 mg Oral Daily    docusate sodium (COLACE) capsule 100 mg  100 mg Oral Daily    doxycycline hyclate (VIBRAMYCIN) capsule 100 mg  100 mg Oral Q12H [address]    DULoxetine (CYMBALTA) delayed release capsule 60 mg  60 mg Oral Daily    flecainide (TAMBOCOR) tablet 50 mg  50 mg Oral BID    gabapentin (NEURONTIN) capsule 100 mg  100 mg Oral Daily    gabapentin (NEURONTIN) capsule 300 mg  300 mg Oral HS    methocarbamol (ROBAXIN) tablet 750 mg  750 mg Oral Q6H PRN    metoprolol succinate (TOPROL-XL) 24 hr tablet 100 mg  100 mg Oral Daily    pantoprazole (PROTONIX) EC tablet 40 mg  40 mg Oral BID AC    pravastatin (PRAVACHOL) tablet 40 mg  40 mg Oral Daily With Dinner    tamsulosin (FLOMAX) capsule 0 4 mg  0 4 mg Oral Daily With Dinner    trimethobenzamide (TIGAN) IM injection 200 mg  200 mg Intramuscular Q12H PRN          Allergies   Allergen Reactions    Morphine GI Intolerance    Vitamin K Other (See Comments)     On coumadin-patient sensitive to vitamin K amounts in meds etc        Objective   Vitals: Blood pressure 146/62, pulse (!) 50, temperature 97 9 °F (36 6 °C), resp  rate 16, weight 114 kg (251 lb 5 2 oz), SpO2 97 %  ,     Body mass index is 35 05 kg/m²  ,     Systolic (65DZI), FPB:587 , Min:141 , ZA     Diastolic (73NNL), KF, Min:62, Max:67    Wt Readings from Last 3 Encounters:   05/10/22 114 kg (251 lb 5 2 oz) 22 114 kg (251 lb)   22 113 kg (250 lb 1 6 oz)      Lab Results   Component Value Date    CREATININE 5 00 (H) 2022    CREATININE 5 04 (H) 05/10/2022    CREATININE 3 92 (H) 2022             Intake/Output Summary (Last 24 hours) at 2022 0758  Last data filed at 5/10/2022 2151  Gross per 24 hour   Intake 480 ml   Output 250 ml   Net 230 ml     Weight (last 2 days)     Date/Time Weight    05/10/22 1929 114 (251 32)    05/10/22 18:56:25 114 (251 32)        Invasive Devices  Report    Peripheral Intravenous Line  Duration           Peripheral IV 05/10/22 Left;Proximal;Ventral (anterior) Forearm <1 day                  Physical Exam  Vitals reviewed  Constitutional:       General: He is not in acute distress  HENT:      Head: Normocephalic and atraumatic  Mouth/Throat:      Mouth: Mucous membranes are moist    Cardiovascular:      Rate and Rhythm: Normal rate and regular rhythm  Heart sounds: S1 normal and S2 normal  Murmur heard  Pulmonary:      Effort: Pulmonary effort is normal  No respiratory distress  Breath sounds: Normal breath sounds  Abdominal:      General: Bowel sounds are normal  There is no distension  Palpations: Abdomen is soft  Musculoskeletal:         General: Normal range of motion  Cervical back: Normal range of motion and neck supple  Right lower le+ Edema present  Left lower le+ Edema present  Skin:     General: Skin is warm and dry  Neurological:      Mental Status: He is alert and oriented to person, place, and time     Psychiatric:         Mood and Affect: Mood normal            LABORATORY RESULTS:      CBC with diff:   Results from last 7 days   Lab Units 22  0334 05/10/22  1949   WBC Thousand/uL 5 31 6 08   HEMOGLOBIN g/dL 7 1* 7 9*   HEMATOCRIT % 22 3* 24 8*   MCV fL 91 91   PLATELETS Thousands/uL 192 237   MCH pg 29 0 28 9   MCHC g/dL 31 8 31 9   RDW % 14 4 14 2   MPV fL 10 1 10 2   NRBC AUTO /100 WBCs 0 0       CMP:  Results from last 7 days   Lab Units 22  0334 05/10/22  1950   POTASSIUM mmol/L 3 2* 2 8*   CHLORIDE mmol/L 102 100   CO2 mmol/L 26 27   BUN mg/dL 35* 35*   CREATININE mg/dL 5 00* 5 04*   CALCIUM mg/dL 8 1* 7 9*   AST U/L  --  14   ALT U/L  --  10*   ALK PHOS U/L  --  94   EGFR ml/min/1 73sq m 10 10       BMP:  Results from last 7 days   Lab Units 22  0334 05/10/22  1950   POTASSIUM mmol/L 3 2* 2 8*   CHLORIDE mmol/L 102 100   CO2 mmol/L 26 27   BUN mg/dL 35* 35*   CREATININE mg/dL 5 00* 5 04*   CALCIUM mg/dL 8 1* 7 9*          Lab Results   Component Value Date    NTBNP 3,948 (H) 2022    NTBNP 121 2022            Results from last 7 days   Lab Units 05/10/22  1950   MAGNESIUM mg/dL 1 4*                     Results from last 7 days   Lab Units 22  0334 05/10/22  1951 22  0000 22  0000   INR  3 18* 3 15* 4 00* 2 50*     Lipid Profile:   Lab Results   Component Value Date    CHOL 151 10/25/2017    CHOL 169 2016    CHOL 176 09/10/2015     Lab Results   Component Value Date    HDL 50 2022    HDL 47 2021    HDL 65 2020     Lab Results   Component Value Date    LDLCALC 123 (H) 2022    LDLCALC 107 (H) 2021    LDLCALC 128 (H) 2020     Lab Results   Component Value Date    TRIG 164 (H) 2022    TRIG 181 (H) 2021    TRIG 116 2020         Cardiac testing:   Results for orders placed during the hospital encounter of 18    Echo complete with contrast if indicated    Narrative  Abigail Tim  [facility], [address] [facility]  [phone]    Transthoracic Echocardiogram  2D, M-mode, Doppler, and Color Doppler    Study date:  2018    Patient: Toma Sullivan  MR number: [id number]  Account number: [de-identified]  : 1950  Age: 80 years  Gender: Male  Status: Outpatient  Location: [address] [place] [address] and Vascular Center  Height: 72 in  Weight: 277 4 lb  BP: 116/ 78 mmHg    Indications: Atrial fibrillation  Diagnoses: I48 0 - Atrial fibrillation    Sonographer:  KAY Russell RDCS RVT  Primary Physician:  Lety Rock MD  Referring Physician:  Aileen Nicholson MD  Group:  [facility]  Interpreting Physician:  Omero Hurst MD    SUMMARY    LEFT VENTRICLE:  Systolic function was mildly reduced by visual assessment  Ejection fraction was estimated to be 45 %  There was mild diffuse hypokinesis  Wall thickness was mildly increased  LEFT ATRIUM:  The atrium was mildly dilated  RIGHT ATRIUM:  The atrium was mildly dilated  MITRAL VALVE:  There was mild regurgitation  TRICUSPID VALVE:  There was mild regurgitation  HISTORY: PRIOR HISTORY: Hyperlipidemia, Coumadin, Sleep apnea  PRIOR PROCEDURES: Arrhythmia ablation  PROCEDURE: The study was performed in the [facility]  This was a routine study  The transthoracic approach was used  The study included complete 2D imaging, M-mode, complete spectral Doppler, and color Doppler  Images were obtained from the parasternal, apical, subcostal, and suprasternal notch acoustic windows  Echocardiographic views were limited due to decreased penetration and lung interference  This was a technically difficult study  LEFT VENTRICLE: Size was normal  Systolic function was mildly reduced by visual assessment  Ejection fraction was estimated to be 45 %  There was mild diffuse hypokinesis  Wall thickness was mildly increased  DOPPLER: Transmitral flow  pattern: atrial fibrillation  RIGHT VENTRICLE: The size was normal  Systolic function was normal  DOPPLER: Systolic pressure was not estimated  LEFT ATRIUM: The atrium was mildly dilated  RIGHT ATRIUM: The atrium was mildly dilated  MITRAL VALVE: Valve structure was normal  There was normal leaflet separation  DOPPLER: The transmitral velocity was within the normal range  There was no evidence for stenosis   There was mild regurgitation  AORTIC VALVE: The valve was trileaflet  Leaflets exhibited normal thickness, mild calcification, and normal cuspal separation  DOPPLER: Transaortic velocity was within the normal range  There was no evidence for stenosis  There was no  regurgitation  TRICUSPID VALVE: The valve structure was normal  There was normal leaflet separation  DOPPLER: The transtricuspid velocity was within the normal range  There was no evidence for stenosis  There was mild regurgitation  PULMONIC VALVE: Leaflets exhibited normal thickness, no calcification, and normal cuspal separation  DOPPLER: The transpulmonic velocity was within the normal range  There was no regurgitation  PERICARDIUM: There was no pericardial effusion  AORTA: The root exhibited normal size  SYSTEMIC VEINS: IVC: The inferior vena cava was normal in size and course  Respirophasic changes were normal     SYSTEM MEASUREMENT TABLES    2D  %FS: 32 47 %  Ao Diam: 3 04 cm  EDV(Teich): 131 46 ml  EF(Teich): 60 39 %  ESV(Cube): 44 15 ml  ESV(Teich): 52 08 ml  IVSd: 1 17 cm  LA Area: 25 58 cm2  LA Diam: 5 06 cm  LVEDV MOD A4C: 85 53 ml  LVEF MOD A4C: 57 2 %  LVESV MOD A4C: 36 61 ml  LVIDd: 5 23 cm  LVIDs: 3 53 cm  LVLd A4C: 7 43 cm  LVLs A4C: 6 23 cm  LVPWd: 1 17 cm  RA Area: 20 53 cm2  RV Diam : 4 cm  SI(Cube): 40 49 ml/m2  SI(Teich): 32 4 ml/m2  SV MOD A4C: 48 92 ml  SV(Cube): 99 21 ml  SV(Teich): 79 38 ml    MM  TAPSE: 2 06 cm    PW  E': 0 08 m/s    [employer] Accredited Echocardiography Laboratory    Prepared and electronically signed by    Brionna Reddy MD  Signed 03-Apr-2018 12:00:40    No results found for this or any previous visit  No valid procedures specified    Results for orders placed during the hospital encounter of 09/21/18    NM myocardial perfusion spect (stress and/or rest)    [address]    Stress Gated SPECT Myocardial Perfusion Imaging after Exercise    Patient: KAMERON Salcido  MR number: [id number]  Account number: [de-identified]  : 1950  Age: 80 years  Gender: Male  Status: Outpatient  Location: [address] [place] [address]  Height: 72 in  Weight: 258 lb  BP: 116/ 93 mmHg    Allergies: MORPHINE    Diagnosis: I48 1 - Atrial flutter    Primary Physician:  Chilango Abreu MD  RN:  Lenin Moreno  Referring Physician:  Qing Mckeon MD  Technician:  Debbie Guzman:  [facility]  Interpreting Physician:  Qing Mckeon MD    HISTORY: Factor V Leiden, Ablation, WILL, Cardioversion  The patient is a 80year old  male  Chest pain status: no chest pain  Coronary artery disease risk factors: dyslipidemia and family history of premature coronary artery  disease  Cardiovascular history: arrhythmia  Co-morbidity: obesity  Previous test results: abnormal ECG  PHYSICAL EXAM: Baseline physical exam screening: no wheezes audible  REST ECG: Atrial fibrillation  PROCEDURE: The study was performed in the the [address] [place] [address]  Treadmill exercise testing was performed, using the Nirmal protocol  Gated SPECT myocardial perfusion imaging was performed during stress  Systolic blood  pressure was 116 mmHg, at the start of the study  Diastolic blood pressure was 93 mmHg, at the start of the study  The heart rate was 87 bpm, at the start of the study  IV double checked  NIRMAL PROTOCOL:  HR bpm SBP mmHg DBP mmHg Symptoms  Baseline 87 116 93 none  Stage 1 120 100 71 leg pain  Stage 2 144 144 72 fatigue, leg pain  Recovery 1 137 174 93 subsiding  Recovery 2 136 176 99 subsiding  Recovery 3 136 148 97 none  Recovery 5 115 137 99 none  No medications or fluids given  STRESS SUMMARY: 02 sat 97% baseline, 97% peak  Duration of exercise was 6 min and 0 sec  The patient exercised to protocol stage 2  Maximal work rate was 7 METs   Functional capacity was slightly decreased (20% to 30%)  Maximal heart rate  during stress was 164 bpm ( 107 % of maximal predicted heart rate)  The heart rate response to stress was normal  There was normal resting blood pressure with an appropriate response to stress  The rate-pressure product for the peak heart  rate and blood pressure was 16241  There was no chest pain during stress  The stress test was terminated due to leg pain and fatigue  The stress ECG was negative for ischemia and normal  Arrhythmia during stress: Persistence of atrial  fibrillation throughout the study  ISOTOPE ADMINISTRATION:  Resting isotope administration Stress isotope administration  Agent Tetrofosmin Tetrofosmin  Dose 15 8 mCi 48 2 mCi  Date 09/21/2018 09/21/2018  Injection-image interval 44 min 41 min    The radiopharmaceutical was injected one minute before the end of exercise  MYOCARDIAL PERFUSION IMAGING:  The image quality was good  Rotating projection images reveal mild diaphragmatic attenuation  Left ventricular size was normal  The TID ratio was 0 88  PERFUSION DEFECTS:  -  There were no significant perfusion defects  PERFUSION QUANTITATION:  The summed perfusion score measured 0 during stress  GATED SPECT:  The calculated left ventricular ejection fraction was 61 %  Left ventricular ejection fraction was within normal limits by visual estimate  There was no left ventricular regional abnormality  SUMMARY:  -  Stress results: Duration of exercise was 6 min and 0 sec  Maximal work rate was 7 METs  Functional capacity was slightly decreased (20% to 30%)  Target heart rate was achieved  There was no chest pain during stress  -  ECG conclusions: The stress ECG was negative for ischemia and normal  Arrhythmia during stress: Persistence of atrial fibrillation throughout the study  -  Perfusion imaging: There were no significant perfusion defects   -  Gated SPECT: The calculated left ventricular ejection fraction was 61 %   Left ventricular ejection fraction was within normal limits by visual estimate  There was no left ventricular regional abnormality  IMPRESSIONS: Normal study after maximal exercise  Myocardial perfusion imaging was normal at rest and with stress  Left ventricular systolic function was normal     Prepared and signed by    Lisandro Llamas MD  Signed 09/21/2018 13:46:16        Imaging: I have personally reviewed pertinent reports  XR chest 1 view portable    Addendum Date: 4/22/2022 Addendum:   ADDENDUM: PICC line tip projects over the region of the mid SVC  Result Date: 4/22/2022  Narrative: CHEST INDICATION:   weakness  COMPARISON:  None EXAM PERFORMED/VIEWS:  XR CHEST PORTABLE FINDINGS: Cardiomediastinal silhouette appears unremarkable  The lungs are clear  No pneumothorax or pleural effusion  Osseous structures appear within normal limits for patient age  Impression: No acute cardiopulmonary disease  Workstation performed: [id number]     US kidney and bladder    Result Date: 4/28/2022  Narrative: RENAL ULTRASOUND INDICATION:   JANEL  COMPARISON: None TECHNIQUE:   Ultrasound of the retroperitoneum was performed with a curvilinear transducer utilizing volumetric sweeps and still imaging techniques  FINDINGS: KIDNEYS: Within normal limits of size  Right kidney:  12 4 x 8 0 x 6 9 cm  Volume 360 7 mL Left kidney:  13 1 x 7 2 x 6 6 cm  Volume 324 9 mL Right kidney Unremarkable echogenicity and contour  No mass is identified  No hydronephrosis  No shadowing calculi  No perinephric fluid collections  Left kidney Unremarkable echogenicity and contour  No mass is identified  No hydronephrosis  No shadowing calculi  No perinephric fluid collections  URETERS: Nonvisualized  BLADDER: Normally distended  No focal thickening or mass lesions  Right ureteral jet detected  Left ureteral jet not detected  Impression: No hydronephrosis   Workstation performed: [id number]     Echo complete w/ contrast if indicated    Result Date: 4/25/2022  Narrative: [place] Left Ventricle: Left ventricular cavity size is mildly dilated  Wall thickness is mildly increased  The left ventricular ejection fraction is 55%  Systolic function is normal  Wall motion is normal  Diastolic function is moderately abnormal, consistent with grade II (pseudonormal) relaxation    Right Ventricle: Right ventricular cavity size is mildly dilated    Left Atrium: The atrium is mildly dilated    Right Atrium: The atrium is mildly dilated    Aortic Valve: The aortic valve is trileaflet  The leaflets are not thickened  The leaflets are moderately calcified  There is mildly reduced mobility  There is mild stenosis  The aortic valve velocity is increased due to stenosis    Mitral Valve: There is mild regurgitation    Tricuspid Valve: There is mild regurgitation  Counseling / Coordination of Care  Total floor / unit time spent today 45 minutes  Greater than 50% of total time was spent with the patient and / or family counseling and / or coordination of care  A description of the counseling / coordination of care: Review of history, current assessment, development of a plan  Code Status: Level 3 - DNAR and DNI    ** Please Note: Dragon 360 Dictation voice to text software may have been used in the creation of this document   **

## 2022-05-11 NOTE — OCCUPATIONAL THERAPY NOTE
Occupational Therapy Evaluation     Patient Name: Alvaro Rai  [id number] Date: 5/11/2022  Problem List  Principal Problem:    JANEL (acute kidney injury) ([facility] )  Active Problems:    Essential hypertension    WILL (obstructive sleep apnea)    Factor V Leiden mutation ([facility] )    Depression, major, single episode, moderate (HCC)    Paroxysmal A-fib ([facility] )    Mixed hyperlipidemia    Weakness    Anemia    Surgical site infection    Glomerulonephritis    Chronic diastolic (congestive) heart failure (HCC)    Hypokalemia    Hypoalbuminemia    Hypomagnesemia    Past Medical History  Past Medical History:   Diagnosis Date    Acute venous embolism and thrombosis of deep vessels of proximal lower extremity (HCC)     Last assessed: 5/18/15    Arthritis     Cellulitis     LE    Chronic kidney disease 3/2020    Acute Kidney Injury    CPAP (continuous positive airway pressure) dependence     Factor V Leiden ([facility] )     Forgetfulness     Headache(784 0) 3/2022    Never till cervical  spine surgery    Heart murmur 3/2020    Told when in hospital    [facility] (hard of hearing)     Paroxysmal atrial fibrillation ([facility] )     Last assessed: 11/2/15    Sleep apnea      Past Surgical History  Past Surgical History:   Procedure Laterality Date    BACK SURGERY      Lower    COLON SURGERY      COLONOSCOPY      INCISION AND DRAINAGE POSTERIOR SPINE N/A 4/1/2022    Procedure: Posterior cervical evacuation of postoperative collection and debridement with placement of drains C3-T1; Surgeon: Jeremy Clifford MD;  [facility]: [facility];  Service: Neurosurgery    ID ARTHRODESIS ANT INTERBODY MIN DISCECTOMY, CERVICAL BELOW C2 N/A 3/11/2022    Procedure: Anterior cervical discectomy and fixation fusion C5/6 and C6/7; Posterior cervical decompression and instrumented fusion C3-T1;   Surgeon: Jeremy Clifford MD;  [facility];  Service: Neurosurgery    SPINE SURGERY  3/11/2022    Cervical myelopathy/ cervical fusion           05/11/22 0926   OT Last Visit   OT Visit Date 05/11/22   Note Type   Note type Evaluation   Restrictions/Precautions   Weight Bearing Precautions Per Order No   Braces or Orthoses C/S Collar   Other Precautions Multiple lines;Pain; Fall Risk   Home Living   Type of [address] Two level;Stairs to enter with rails;1/2 bath on main level;Bed/bath upstairs  (4STE)   Bathroom Shower/Tub Walk-in shower   Bathroom Equipment Shower chair   Home Equipment Walker   Additional Comments Pt has been using RW since surgery, keeps on upstairs/downstairs/outside  Prior to surgery was I with mobility   Prior Function   Level of [place] Independent with ADLs and functional mobility   Lives With Spouse   Receives Help From Family   ADL Assistance Independent   IADLs Needs assistance   Falls in the last 6 months 1 to 4  (2-3 before neck surgery on 3/11/22)   Vocational Retired   Comments Pt has not driven since cervical fusion 3/11/22   Lifestyle   Autonomy Pt was independent prior to cervical fusion 3/11/22, reports he can dress independently now   A for IADLs since surgery   Reciprocal Relationships Pt lives with wife   Service to Others Retired    Psychosocial   Psychosocial ([facility]) WDL   Subjective   Subjective "Since my neck surgery I have muscles back that I haven't had in 15 years"   ADL   Eating Assistance 7  Independent   Grooming Assistance 7  Independent   UB Bathing Assistance 7  Independent   LB [address] 7  [address] 7  [address]  [address] [address] 5  Supervision   Supine to Sit 5  Supervision   Additional items Increased time required   Additional Comments Pt ended session OOB to recliner   Transfers   Sit to Stand 5  Supervision   Additional items Increased time required   Stand to Sit 5  Supervision   Additional items Armrests   Stand pivot 5  Supervision   Additional items   (RW) Functional Mobility   Functional Mobility 5  Supervision   Additional items Rolling walker   Balance   Static Sitting Good   Dynamic Sitting Fair +   Static Standing Fair -   Dynamic Standing Fair -   Ambulatory Fair -   Activity Tolerance   Activity Tolerance Patient tolerated treatment well   Medical Staff Made Aware PT Renata   Nurse Made Aware MIREILLE Collins Assessment   RUE Assessment WFL   LUE Assessment   LUE Assessment WFL   Sensation   Light Touch No apparent deficits   Cognition   Overall Cognitive Status WFL   Arousal/Participation Alert   Attention Within functional limits   Orientation Level Oriented X4   Memory Within functional limits   Following Commands Follows all commands and directions without difficulty   Assessment   Prognosis Good   Assessment Pt is a 71 y o  male seen for OT evaluation at [facility], admitted 5/10/2022 w/ JANEL (acute kidney injury) ([facility] )  OT completed expanded review of pt's medical and social history  Comorbidities affecting pt's functional performance at time of assessment include: HTN, WILL, Factor V Leiden mutation, depression, A-fib, weakness, anemia, surgical site infection, CHF, glomerulonephritis, great toe pain, recent cervical fusion on 3/11/22  Personal factors affecting pt at time of IE include:steps to enter environment  Pt with active OT orders and OOB/ambulate orders  Prior to admission, pt was living in a 2 SH with his wife  Pt was I w/  ADLS, A for some IADLS 2/2 recent surgery, (-) drove since surgery in March 2/2 cervical collar, & required use of RW since surgery for functional mobility/transfers, I for UB ADLs and I for LB ADLs  Based on findings, pt is of moderate complexity  Pt at functional baseline since surgery with no acute OT needs at this time, will D/C from OT caseload  Recommend pt continue to be OOB for all meals & mobilize with nursing in halls    The patient's raw score on the AM-PAC Daily Activity inpatient short form is 24, standardized score is 57 54, greater than 39 4  Patients at this level are likely to benefit from DC to home, which DOES coincide with CURRENT above OT recommendations  However please refer to therapist recommendation for discharge planning given other factors that may influence destination  At this time, OT recommendations at time of discharge are home with no needs     Goals   Patient Goals Pt wishes to go home   Recommendation   OT Discharge Recommendation No rehabilitation needs   AM-PAC Daily Activity Inpatient   Lower Body Dressing 4   Bathing 4   Toileting 4   Upper Body Dressing 4   Grooming 4   Eating 4   Daily Activity Raw Score 24   Daily Activity Standardized Score (Calc for Raw Score >=11) 57 54   AM-PAC Applied Cognition Inpatient   Following a Speech/Presentation 4   Understanding Ordinary Conversation 4   Taking Medications 4   Remembering Where Things Are Placed or Put Away 4   Remembering List of 4-5 Errands 4   Taking Care of Complicated Tasks 4   Applied Cognition Raw Score 24   Applied Cognition Standardized Score 62 21     Vi Brooks OTR/L

## 2022-05-11 NOTE — PROGRESS NOTES
[place]  Progress Note - Alvaro Rai 1950, 71 y o  male MRN: [id number]  Unit/Bed#: -Yaya Encounter: [id number]  Primary Care Provider: Kristen Barfield MD   Date and time admitted to hospital: 5/10/2022  6:51 PM    Hypomagnesemia  Assessment & Plan  · Magnesium 1 4 on admission  · Ordered 1 g IV magnesium   · Continue to monitor and replete as needed    Hypoalbuminemia  Assessment & Plan  · Albumin 2 1 on admission   · Patient with bilateral lower extremity swelling  Suspect anasarca   · Ordered 1 dose of IV albumin  · Nephrology consulted    Hypokalemia  Assessment & Plan  · Potassium 2 8 on admission  · Ordered 40 mEq p o  potassium and 20 mEq IV potassium   · On telemetry   · Continue to monitor and replete as needed    Chronic diastolic (congestive) heart failure (HCC)  Assessment & Plan  Wt Readings from Last 3 Encounters:   05/10/22 114 kg (251 lb 5 2 oz)   05/03/22 114 kg (251 lb)   04/29/22 113 kg (250 lb 1 6 oz)     · Prior echo 04/25/2022:  EF 90%, Diastolic function: grade 2 pseudonormal relaxation  · Monitor I/O and daily weights  · Does not take outpatient diuretics  IV Lasix with minimal results during prior admission  Patient had received albumin to help with anasarca  · Fluid restriction, 2 g sodium    Glomerulonephritis  Assessment & Plan  · Diagnosed during prior admission  · Biopsy was not performed during prior admit  · Postinfectious suspected  · Treatment per nephrology     Surgical site infection  Assessment & Plan  · S/p cervical fusion performed by Dr Jose Francisco Wells on 0/10/80, complicated by MRSA infection  · Completed 24 days of IV vancomycin, transitioned to daptomycin on 04/24-4/29  Discharged home on doxycycline 100 mg b i d    · Continue doxycycline  · apprec ID, not felt to be contributing to PIGN     Anemia  Assessment & Plan  · Hemoglobin on admission:  7 9  · Patient with hematuria    Believed to be due to GN  · During prior admission, new normocytic anemia, hemoglobin had been 6 6, received 1 unit of PRBCs on 04/22  · FOBT was negative  · Transfuse if hemoglobin less than 7    Weakness  Assessment & Plan  · Reports ongoing weakness and fatigue  Per wife patient spends majority of the day napping  · Denies recent falls  · Suspect ongoing weakness/fatigue due to ongoing infection, JANEL, GN and deconditioning from frequent hospital admissions  · Consult PT/OT    Mixed hyperlipidemia  Assessment & Plan  · Continue statin    Paroxysmal A-fib (HCC)  Assessment & Plan  · Home regimen: metoprolol succinate 100 mg daily and flecainide 100 mg b i d   · Continue metoprolol  flecanide on hold by cards d/t variable renal function  · Coumadin 2 5 mg 5x weekly, 5 mg 2x weekly  · INR 4 0 on 05/09  Outpatient provider had instructed patient to hold dose on 5/9 and 5/10  · INR on admission on 05/10 was 3 15  Continue hold of Coumadin  · INR goal 2-3     Depression, major, single episode, moderate (HCC)  Assessment & Plan  · Continue Cymbalta    Factor V Leiden mutation ([facility] )  Assessment & Plan  · Prescribed Coumadin  · INR 4 0 on 05/09  Outpatient provider had instructed patient to hold dose on 5/9 and 5/10  · INR on admission on 05/10 was 3 15  Continue hold of Coumadin, INR increased this AM  · INR goal 2-3    WILL (obstructive sleep apnea)  Assessment & Plan  · CPAP HS - patient brought home machine in    Essential hypertension  Assessment & Plan  · Home regimen:  Amlodipine 5 mg daily and metoprolol succinate 100 mg daily    * JANEL (acute kidney injury) ([facility] )  Assessment & Plan  · Creatinine on admission: 5 04  · Direct admit due to rising creatinine outpatient  · Bilateral lower extremity 2+ pitting edema  · Prior admission: admitted on 04/22,  Discharged on 04/29 with creatinine 3 85     · Believed to be due to volume overload, postinfectious GN, Staph aureus bacteremia  · Renal biopsy was not performed  · Baseline prior to this event was 0 6-0 9  · Fluid restriction, monitor I/O  · Avoid nephrotoxins, hypotension, IV contrast  · Consult nephrology - await recommendations  No clear indications for emergency HD at this time  VTE  Prophylaxis:   Pharmacologic: in place    Patient Centered Rounds: I have performed bedside rounds with nursing staff today  Discussions with Specialists or Other Care Team Provider: case management    Education and Discussions with Family / Patient: wife via pt mobile      Current Length of Stay: 1 day(s)    Current Patient Status: Inpatient        Code Status: Level 3 - DNAR and DNI      Subjective:   Pt seen  Reports only fatigue  Denies confusion, dizziness, sob, cp    Patient is seen and examined at bedside  All other ROS are negative  Objective:     Vitals:   Temp (24hrs), Av 9 °F (36 6 °C), Min:97 8 °F (36 6 °C), Max:97 9 °F (36 6 °C)    Temp:  [97 8 °F (36 6 °C)-97 9 °F (36 6 °C)] 97 8 °F (36 6 °C)  HR:  [50-55] 55  Resp:  [16-17] 17  BP: (141-181)/(62-83) 181/83  SpO2:  [96 %-97 %] 96 %  Body mass index is 35 05 kg/m²  Input and Output Summary (last 24 hours):        Intake/Output Summary (Last 24 hours) at 2022 1232  Last data filed at 5/10/2022 2151  Gross per 24 hour   Intake 480 ml   Output 250 ml   Net 230 ml       Physical Exam:       GEN: No acute distress, comfortable  HEEENT: No JVD, PERRLA, no scleral icterus  RESP: Lungs clear to auscultation bilaterally  CV: RRR, +s1/s2   ABD: SOFT NON TENDER, POSITIVE BOWEL SOUNDS, NO DISTENTION  PSYCH: CALM  NEURO: A X O X 3, NO FOCAL DEFICITS  SKIN: NO RASH  EXTREM: NO EDEMA    Additional Data:     Labs:    Results from last 7 days   Lab Units 22  0334   WBC Thousand/uL 5 31   HEMOGLOBIN g/dL 7 1*   HEMATOCRIT % 22 3*   PLATELETS Thousands/uL 192   NEUTROS PCT % 55   LYMPHS PCT % 29   MONOS PCT % 11   EOS PCT % 4     Results from last 7 days   Lab Units 22  0334 05/10/22  1950   SODIUM mmol/L 139 137   POTASSIUM mmol/L 3 2* 2 8*   CHLORIDE mmol/L 102 100   CO2 mmol/L 26 27   BUN mg/dL 35* 35*   CREATININE mg/dL 5 00* 5 04*   ANION GAP mmol/L 11 10   CALCIUM mg/dL 8 1* 7 9*   ALBUMIN g/dL  --  2 1*   TOTAL BILIRUBIN mg/dL  --  0 40   ALK PHOS U/L  --  94   ALT U/L  --  10*   AST U/L  --  14   GLUCOSE RANDOM mg/dL 89 166*     Results from last 7 days   Lab Units 05/11/22  0334   INR  3 18*             Results from last 7 days   Lab Units 05/10/22  1949   LACTIC ACID mmol/L 1 4           * I Have Reviewed All Lab Data Listed Above  Imaging:     Results for orders placed during the hospital encounter of 04/22/22    XR chest 1 view portable    Addendum 4/22/2022  8:39 PM  ADDENDUM:    PICC line tip projects over the region of the mid SVC  Narrative  CHEST    INDICATION:   weakness  COMPARISON:  None    EXAM PERFORMED/VIEWS:  XR CHEST PORTABLE      FINDINGS:    Cardiomediastinal silhouette appears unremarkable  The lungs are clear  No pneumothorax or pleural effusion  Osseous structures appear within normal limits for patient age  Impression  No acute cardiopulmonary disease  Workstation performed: [id number]    No results found for this or any previous visit  *I have reviewed all imaging reports listed above      Recent Cultures (last 7 days):     Results from last 7 days   Lab Units 05/10/22  2022 05/10/22  1953   BLOOD CULTURE  Received in Microbiology Lab  Culture in Progress  Received in Microbiology Lab  Culture in Progress         Last 24 Hours Medication List:   Current Facility-Administered Medications   Medication Dose Route Frequency Provider Last Rate    acetaminophen  650 mg Oral Q6H PRN Reanna Strong PA-C      amLODIPine  5 mg Oral Daily Reanna Strong PA-C      docusate sodium  100 mg Oral Daily Reanna Strong PA-C      doxycycline hyclate  100 mg Oral Q12H [address] Reanna Strong PA-C      DULoxetine  60 mg Oral Daily Reanna Strong PA-C      gabapentin  100 mg Oral Daily Reanna Strong PA-C      gabapentin  300 mg Oral HS Jeral Sejal, PA-C      methocarbamol  750 mg Oral Q6H PRN Jeral Sejal, PA-C      metoprolol succinate  100 mg Oral Daily Jeral Sejal, PA-C      pantoprazole  40 mg Oral BID AC Jeral Sejal, PA-C      pravastatin  40 mg Oral Daily With Dinner Jeral Sejal, PA-C      tamsulosin  0 4 mg Oral Daily With Dinner Jeral Sejal, PA-C      trimethobenzamide  200 mg Intramuscular Q12H PRN Jeral Sejal, PA-C          Today, Patient Was Seen By: Eunice Cortes MD    ** Please Note: Dictation voice to text software may have been used in the creation of this document   **

## 2022-05-11 NOTE — ASSESSMENT & PLAN NOTE
· Home regimen: metoprolol succinate 100 mg daily and flecainide 100 mg b i d   · Continue metoprolol  flecanide on hold by cards d/t variable renal function  · Coumadin 2 5 mg 5x weekly, 5 mg 2x weekly  · INR 4 0 on 05/09  Outpatient provider had instructed patient to hold dose on 5/9 and 5/10  · INR on admission on 05/10 was 3 15    Continue hold of Coumadin  · INR goal 2-3

## 2022-05-11 NOTE — CASE MANAGEMENT
Case Management Assessment & Discharge Planning Note    Patient name Bg Thomas  Location /-01 MRN [id number]  : 1950 Date 2022       Current Admission Date: 5/10/2022  Current Admission Diagnosis:JANEL (acute kidney injury) [facility])   Patient Active Problem List    Diagnosis Date Noted    Hypoalbuminemia 2022    Hypomagnesemia 2022    Chronic diastolic (congestive) heart failure ([address] ) 05/10/2022    Hypokalemia 05/10/2022    Microscopic hematuria 2022    Urinary frequency 2022    Glomerulonephritis     Other proteinuria     Ambulatory dysfunction 2022    JANEL (acute kidney injury) ([facility] ) 2022    Great toe pain, right 04/10/2022    Surgical site infection 2022    Status post cervical spinal fusion 2022    Anemia 2022    Head pain cephalgia 2022    Hyponatremia 2022    Muscle spasm 2022    Goals of care, counseling/discussion 2022    Sinus bradycardia 03/10/2022    Cervical myelopathy ([facility] ) 2022    Weakness 2022    Pes anserinus bursitis of right knee 2021    IFG (impaired fasting glucose) 2021    History of DVT of lower extremity 2021    Mixed hyperlipidemia 10/27/2020    Paroxysmal A-fib ([facility] ) 2020    Lower extremity edema 2020    Primary osteoarthritis of left knee 2020    Primary osteoarthritis of right knee 2020    Depression, major, single episode, moderate ([facility] ) 10/30/2017    Insomnia 03/10/2017    Lumbar herniated disc 03/10/2017    Erectile dysfunction 2016    Status post catheter ablation of atrial flutter 2015    Essential hypertension 2015    WILL (obstructive sleep apnea) 2015    Factor V Leiden mutation ([facility] ) 2014      LOS (days): 1  Geometric Mean LOS (GMLOS) (days): 3 10  Days to GMLOS:2 5     OBJECTIVE:  PATIENT READMITTED TO HOSPITAL  Risk of Unplanned Readmission Score: 37 93 Current admission status: Inpatient  Referral Reason: VNA    Preferred Pharmacy:   [address], [address]  [address] [facility], [address] [facility] [address]  [facility] [address]  Phone: [phone] Fax: [phone]    [id number], [address] [facility]  [address] [address]  Phone: [phone] Fax: [phone]    Primary Care Provider: Esequiel Miramontes MD    Primary Insurance: [facility]  Secondary Insurance:     ASSESSMENT:  [address] [facility] Representative - Spouse   Primary Phone: [phone] (Mobile)  Home Phone: [phone]               Advance Directives  Does patient have a [address] [facility] [facility] [address]?: Yes  Does patient have Advance Directives?: Yes  Advance Directives: Living will, Power of  for health care  Primary Contact: Sarahi Coburn (Spouse)   [phone] (Mobile)    Readmission Root Cause  30 Day Readmission: Yes  Who directed you to return to the hospital?: Specialist  Did you understand whom to contact if you had questions or problems?: Yes  Did you get your prescriptions before you left the hospital?: Yes  Were you able to get your prescriptions filled when you left the hospital?: Yes  Did you take your medications as prescribed?: Yes  Were you able to get to your follow-up appointments?: Yes  During previous admission, was a post-acute recommendation made?: Yes  What post-acute resources were offered?: [facility]  Patient was readmitted due to: JANEL worsening kidney function  Action Plan: Continue HHC and OP CM    Patient Information  Admitted from[de-identified] Home  Mental Status: Alert  During Assessment patient was accompanied by: Spouse  Assessment information provided by[de-identified] Patient, Spouse  Primary Caregiver: Self  Support Systems: Self, Spouse/significant other, [address] of Residence: [address] [facility] do you live in?: [address] entry access options   Select all that apply : Stairs  Number of steps to enter home : 5  Do the steps have railings?: No  Type of Current Residence: 2 story home  Upon entering residence, is there a bedroom on the main floor (no further steps)?: Yes  Upon entering residence, is there a bathroom on the main floor (no further steps)?: Yes  In the last 12 months, was there a time when you were not able to pay the mortgage or rent on time?: No  In the last 12 months, how many places have you lived?: 1  In the last 12 months, was there a time when you did not have a steady place to sleep or slept in a shelter (including now)?: No  Living Arrangements: Lives w/ Spouse/significant other  Is patient a ?: No    Activities of Daily Living Prior to Admission  Functional Status: Independent  Completes ADLs independently?: Yes  Ambulates independently?: Yes  Does patient use assisted devices?: Yes  Assisted Devices (DME) used: [employer]  Does patient currently own DME?: Yes  What DME does the patient currently own?: [employer]  Does patient have a history of Outpatient Therapy (PT/OT)?: No  Does the patient have a history of Short-Term Rehab?: No  Does patient have a history of HHC?: Yes ([facility])  Does patient currently have [place] [address]?: Yes    Current Home Health Care  Type of Current Home Care Services: Home PT, Home OT, Nurse visit  [address][de-identified] [facility][note 3][facility] Provider[de-identified] PCP    Patient Information Continued  Income Source: Pension/[employer]  Does patient have prescription coverage?: Yes  Within the past 12 months, you worried that your food would run out before you got the money to buy more : Never true  Within the past 12 months, the food you bought just didn't last and you didn't have money to get more : Never true  Food insecurity resource given?: N/A  Does patient receive dialysis treatments?: No  Does patient have a history of substance abuse?: No  Does patient have a history of Mental Health Diagnosis?: No    Means of Transportation  Means of Transport to Appts[de-identified] Drives Self  In the past 12 months, has lack of transportation kept you from medical appointments or from getting medications?: No  In the past 12 months, has lack of transportation kept you from meetings, work, or from getting things needed for daily living?: No  Was application for public transport provided?: N/A    DISCHARGE DETAILS:    Discharge planning discussed with[de-identified] Patient and spouse  Freedom of Choice: Yes     CM contacted family/caregiver?: Yes  Were Treatment Team discharge recommendations reviewed with patient/caregiver?: Yes  Did patient/caregiver verbalize understanding of patient care needs?: Yes  Were patient/caregiver advised of the risks associated with not following Treatment Team discharge recommendations?: Yes    Contacts  Patient Contacts: Kriss Izquierdos  Relationship to Patient[de-identified] Family  Contact Method: Phone  Phone Number: [phone]  Reason/Outcome: Discharge Planning, Emergency [address]         Is the patient interested in Ada Yañez at discharge?: Yes  [address] requested[de-identified] Nursing, Occupational Therapy, Physical [address] Name[de-identified] [address] [place] [address] Provider[de-identified] PCP  Home Health Services Needed[de-identified] Evaluate Functional Status and Safety, Gait/ADL Training, Strengthening/Theraputic Exercises to Improve Function, Post-Op Care and Assessment  Homebound Criteria Met[de-identified] Uses an Assist Device (i e  cane, walker, etc)  Supporting Clincal Findings[de-identified] Fatigues Easliy in [employer], Limited Endurance    DME Referral Provided  Referral made for DME?: No    Other Referral/Resources/Interventions Provided:  Interventions: [facility]    Treatment Team Recommendation: Home with [address] [employer]     Additional Comments: Met with patient in room and had spouse on speaker phone  Patient is current with [facility] referral made for JOSÉ MIGUEL at discharge    Pt is a readmission has OP CM and has had F/U appointments

## 2022-05-11 NOTE — UTILIZATION REVIEW
Initial Clinical Review    Admission: Date/Time/Statement:   Admission Orders (From admission, onward)     Ordered        05/10/22 2040  Inpatient Admission  Once                      Orders Placed This Encounter   Procedures    Inpatient Admission     Standing Status:   Standing     Number of Occurrences:   1     Order Specific Question:   Level of Care     Answer:   Med Surg [16]     Order Specific Question:   Estimated length of stay     Answer:   More than 2 Midnights     Order Specific Question:   Certification     Answer:   I certify that inpatient services are medically necessary for this patient for a duration of greater than two midnights  See H&P and MD Progress Notes for additional information about the patient's course of treatment  Arrival Information     Patient not seen in ED as direct admission due to post infectious glomerulonephritis with rising creatinine  chief complaint:  Nausea and weakness in setting of rising creatinine  Initial Presentation: 71 y o  male from home direct admission due to JANEL  Patient is status post cervical fusion 2/41/62 complicated by MRSA infection, completed 24 days of IV vancomycin and transitioned to Daptomycin 4/24 - 4/29/22, currently on doxycycline; Glomerulonephritis diagnosed prior admission with anemia  PMH of CHF, hyperlipidemia, PAF, depression, factor V Leiden mutation on Coumadion, WILL on CPAP, hpt  Presented due to rising creatinine, creatinine at time of dc 4/29/22 was 3 85 and on day of arrival told > 4  Baseline creatinine is less than 1  Has weakness and fatigue last 2 weeks, feels thirsty  + nausea  On exam obese  Bilateral +2 edema  H&H 7 9/24 8   K 2 8  Bun 35, creatinine 5 04  INR 3 15  mg 1 4  Plan is fluid restriction  Consult nephrology  Consult ID and continue doxycycline  Transfuse hgb < 7  Dose of albumin with albumin of 2 1  Telemetry, replete K and trend BMP  Replete Mag  Consult PT/OT  Consult cardiology  Hold coumadin  Continue metoprolol and decrease flecainide  Date: 5/11/22   Day 2: overnight ecg done, qtc prolonged and Zofran dc, use tigan for nausea  Has continued nausea  + fatigue  Exam bilateral LE edema    H&H 7 1/22  3   K 3 2  Bun 35, creatinine 2  Plan is dose IV lasix  5/11/22 per Cardiology - patient with JANEL and has fluid restriciton  History of PAF with a flutter ablation in 2015  QRS is 218 and to hold Flecainide  Replete Mag to goal of 2, replete K to goal of 4  Suspect edema secondary to hypoalbuminemia and has been given albumin  Continue home amlodipine and Toprol XL       5/11/22 per ID - patient with post op wound infection/osteomyelitis of cervical spine and ARF  Recommend to continue Doxycycline  Until 5/24, doubt this is contributing to ARF  5/11/22 per nephrology - patient with JANEL/worsening renal function  Baseline creatinine is less than 1 and developed JANEL in April  Plan is Lasix IV x 1, monitor I/O  Avoid contrast studies and NSAIDS  Replete K  ED Triage Vitals   Temperature Pulse Respirations Blood Pressure SpO2   05/10/22 1856 05/10/22 1856 05/10/22 2317 05/10/22 1856 05/10/22 1856   97 9 °F (36 6 °C) 55 16 141/67 97 %      Temp Source Heart Rate Source Patient Position - Orthostatic VS BP Location FiO2 (%)   05/11/22 0247 -- -- -- --   Oral          Pain Score       05/10/22 1917       No Pain          Wt Readings from Last 1 Encounters:   05/10/22 114 kg (251 lb 5 2 oz)     Additional Vital Signs:   05/11/22 08:45:11 97 8 °F (36 6 °C) 55 17 181/83 Abnormal  116 96 % --   05/11/22 0300 -- -- -- -- -- 97 % Full face mask   05/10/22 23:17:48 -- 50 Abnormal  16 146/62 90 97 %        Pertinent Labs/Diagnostic Test Results:  No updated imaging   No orders to display     4/28/22 US kidney and bladder No hydronephrosis    4/25/22 echo Left Ventricle: Left ventricular cavity size is mildly dilated  Wall thickness is mildly increased   The left ventricular ejection fraction is 55%  Systolic function is normal  Wall motion is normal  Diastolic function is moderately abnormal, consistent with grade II (pseudonormal) relaxation    Right Ventricle: Right ventricular cavity size is mildly dilated    Left Atrium: The atrium is mildly dilated    Right Atrium: The atrium is mildly dilated    Aortic Valve: The aortic valve is trileaflet  The leaflets are not thickened  The leaflets are moderately calcified  There is mildly reduced mobility  There is mild stenosis  The aortic valve velocity is increased due to stenosis    Mitral Valve: There is mild regurgitation    Tricuspid Valve: There is mild regurgitation      4/22/22 ecg Sinus bradycardia with 1st degree A-V block  Right bundle branch block  Abnormal ECG    4/22/22 CxR - No acute cardiopulmonary disease    Results from last 7 days   Lab Units 05/11/22  0334 05/10/22  1949   WBC Thousand/uL 5 31 6 08   HEMOGLOBIN g/dL 7 1* 7 9*   HEMATOCRIT % 22 3* 24 8*   PLATELETS Thousands/uL 192 237   NEUTROS ABS Thousands/µL 2 91 4 04     Results from last 7 days   Lab Units 05/11/22  0334 05/10/22  1950   SODIUM mmol/L 139 137   POTASSIUM mmol/L 3 2* 2 8*   CHLORIDE mmol/L 102 100   CO2 mmol/L 26 27   ANION GAP mmol/L 11 10   BUN mg/dL 35* 35*   CREATININE mg/dL 5 00* 5 04*   EGFR ml/min/1 73sq m 10 10   CALCIUM mg/dL 8 1* 7 9*   MAGNESIUM mg/dL  --  1 4*   PHOSPHORUS mg/dL  --  5 4*     Results from last 7 days   Lab Units 05/10/22  1950   AST U/L 14   ALT U/L 10*   ALK PHOS U/L 94   TOTAL PROTEIN g/dL 6 2*   ALBUMIN g/dL 2 1*   TOTAL BILIRUBIN mg/dL 0 40     Results from last 7 days   Lab Units 05/11/22  0334 05/10/22  1950   GLUCOSE RANDOM mg/dL 89 166*     Results from last 7 days   Lab Units 05/10/22  1950   HS TNI 0HR ng/L 10     Results from last 7 days   Lab Units 05/11/22  0334 05/10/22  1951/22  0000   PROTIME seconds 31 7* 31 4*  --    INR  3 18* 3 15* 4 00*     Results from last 7 days Lab Units 05/10/22  1949   LACTIC ACID mmol/L 1 4     Results from last 7 days   Lab Units 05/10/22  1950 05/10/22  1949   CRP mg/L 8 2*  --    SED RATE mm/hour  --  105*     Results from last 7 days   Lab Units 05/10/22  2154 05/05/22  0000   CLARITY UA  Clear  --    COLOR UA  Yellow  --    SPEC GRAV UA  1 020  --    PH UA  6 0  --    GLUCOSE UA mg/dl 100 (1/10%)*  --    KETONES UA mg/dl Negative  --    BLOOD UA  Large*  --    PROTEIN UA mg/dl >=300* 507 5   NITRITE UA  Negative  --    BILIRUBIN UA  Negative  --    UROBILINOGEN UA E U /dl 0 2  --    LEUKOCYTES UA  Negative  --    WBC UA /hpf 10-20*  --    RBC UA /hpf Innumerable*  --    BACTERIA UA /hpf Occasional  --    EPITHELIAL CELLS WET PREP /hpf Occasional  --    CREATININE UR   --  40 9     Results from last 7 days   Lab Units 05/10/22  2022 05/10/22  1953   BLOOD CULTURE  Received in Microbiology Lab  Culture in Progress  Received in Microbiology Lab  Culture in Progress  Past Medical History:   Diagnosis Date    Acute venous embolism and thrombosis of deep vessels of proximal lower extremity (HCC)     Last assessed: 5/18/15    Arthritis     Cellulitis     LE    Chronic kidney disease 3/2020    Acute Kidney Injury    CPAP (continuous positive airway pressure) dependence     Factor V Leiden ([address] )     Forgetfulness     Headache(784 0) 3/2022    Never till cervical  spine surgery    Heart murmur 3/2020    Told when in hospital   [facility] (hard of hearing)     Paroxysmal atrial fibrillation (HCC)     Last assessed: 11/2/15    Sleep apnea      Present on Admission:   JANEL (acute kidney injury) ([address] )   Surgical site infection   Glomerulonephritis   Anemia   Mixed hyperlipidemia   Paroxysmal A-fib (HCC)   Depression, major, single episode, moderate (HCC)   Factor V Leiden mutation (HCC)   WILL (obstructive sleep apnea)   Essential hypertension   Weakness      Admitting Diagnosis: JANEL (acute kidney injury) ([address] )  Age/Sex: 71 y o  male  Admission Orders:  5/10/22 2040 inpatient   Scheduled Medications:  amLODIPine, 5 mg, Oral, Daily  docusate sodium, 100 mg, Oral, Daily  doxycycline hyclate, 100 mg, Oral, Q12H VARGAS  DULoxetine, 60 mg, Oral, Daily  furosemide, 80 mg, Intravenous, Once - 1245 on 5/11/22   gabapentin, 100 mg, Oral, Daily  gabapentin, 300 mg, Oral, HS  metoprolol succinate, 100 mg, Oral, Daily  pantoprazole, 40 mg, Oral, BID AC  pravastatin, 40 mg, Oral, Daily With Dinner  tamsulosin, 0 4 mg, Oral, Daily With Dinner    albumin human (FLEXBUMIN) 5 % injection 25 g  Dose: 25 g  Freq: Once Route: IV  Last Dose: Stopped (05/10/22 2215)  Start: 05/10/22 2115 End: 05/10/22 2215    potassium chloride (K-DUR,KLOR-CON) CR tablet 40 mEq - given x 1 5/10, x 1 5/11  Dose: 40 mEq  Freq: Once Route: PO    potassium chloride 20 mEq IVPB (premix) - given x 1 5/10, x 1 5/11/22   Dose: 20 mEq  Freq: Once Route: IV    Continuous IV Infusions: none      PRN Meds:  acetaminophen, 650 mg, Oral, Q6H PRN - used x 1 5/10, x 1 5/11/22   methocarbamol, 750 mg, Oral, Q6H PRN - use x 1 5/11/22   trimethobenzamide, 200 mg, Intramuscular, Q12H PRN    ondansetron (ZOFRAN) injection 4 mg - used x 1 5/10/22   Dose: 4 mg  Freq: Every 4 hours PRN Route: IV  PRN Reason: nausea  Start: 05/10/22 1854 End: 05/11/22 0322    Telemetry   Fluid restriction     IP CONSULT TO NEPHROLOGY  IP CONSULT TO INFECTIOUS DISEASES  IP CONSULT TO CARDIOLOGY    Network Utilization Review Department  ATTENTION: Please call with any questions or concerns to [phone] and carefully listen to the prompts so that you are directed to the right person  All voicemails are confidential   An Negrete all requests for admission clinical reviews, approved or denied determinations and any other requests to dedicated fax number below belonging to the campus where the patient is receiving treatment   List of dedicated fax numbers for the Facilities:  FACILITY NAME UR FAX NUMBER   ADMISSION DENIALS (Administrative/Medical Necessity) [phone]   [address] (Maternity/NICU/Pediatrics) 261 [facility] 40 125 [facility]  [phone]   [address] [place] [address] [address] [place] [address] [address] [facility] [place] [address] [phone]

## 2022-05-11 NOTE — PROGRESS NOTES
PEBBLES ISSA did chart review and had been notified by CM MIREILLE Martinez during morning huddle that pt was admitted to the hospital yesterday      Will F/U by 5/18

## 2022-05-11 NOTE — ASSESSMENT & PLAN NOTE
· Potassium 2 8 on admission  · Ordered 40 mEq p o  potassium and 20 mEq IV potassium   · On telemetry   · Continue to monitor and replete as needed

## 2022-05-11 NOTE — ASSESSMENT & PLAN NOTE
· Reports ongoing weakness and fatigue    Per wife patient spends majority of the day napping  · Denies recent falls  · Suspect ongoing weakness/fatigue due to ongoing infection, JANEL, GN and deconditioning from frequent hospital admissions  · Consult PT/OT

## 2022-05-11 NOTE — QUICK NOTE
EKG on admission showing sinus bradycardia with first-degree AV block  QT//565    Patient complaining of ongoing nausea    Order Tigan to prevent further QT prolongation

## 2022-05-12 ENCOUNTER — TELEPHONE (OUTPATIENT)
Dept: NEUROSURGERY | Facility: CLINIC | Age: 72
End: 2022-05-12

## 2022-05-12 PROBLEM — E88.09 HYPOALBUMINEMIA: Status: RESOLVED | Noted: 2022-05-11 | Resolved: 2022-05-12

## 2022-05-12 LAB
ALBUMIN SERPL BCP-MCNC: 2 G/DL (ref 3.5–5)
ALP SERPL-CCNC: 81 U/L (ref 46–116)
ALT SERPL W P-5'-P-CCNC: 9 U/L (ref 12–78)
ANION GAP SERPL CALCULATED.3IONS-SCNC: 9 MMOL/L (ref 4–13)
AST SERPL W P-5'-P-CCNC: 14 U/L (ref 5–45)
BACTERIA UR CULT: NORMAL
BASOPHILS # BLD AUTO: 0.05 THOUSANDS/ΜL (ref 0–0.1)
BASOPHILS NFR BLD AUTO: 1 % (ref 0–1)
BILIRUB SERPL-MCNC: 0.6 MG/DL (ref 0.2–1)
BLD SMEAR INTERP: NORMAL
BUN SERPL-MCNC: 39 MG/DL (ref 5–25)
CALCIUM ALBUM COR SERPL-MCNC: 9.7 MG/DL (ref 8.3–10.1)
CALCIUM SERPL-MCNC: 8.1 MG/DL (ref 8.3–10.1)
CHLORIDE SERPL-SCNC: 104 MMOL/L (ref 100–108)
CO2 SERPL-SCNC: 28 MMOL/L (ref 21–32)
CREAT SERPL-MCNC: 5.11 MG/DL (ref 0.6–1.3)
EOSINOPHIL # BLD AUTO: 0.22 THOUSAND/ΜL (ref 0–0.61)
EOSINOPHIL NFR BLD AUTO: 4 % (ref 0–6)
ERYTHROCYTE [DISTWIDTH] IN BLOOD BY AUTOMATED COUNT: 14.4 % (ref 11.6–15.1)
GFR SERPL CREATININE-BSD FRML MDRD: 10 ML/MIN/1.73SQ M
GLUCOSE SERPL-MCNC: 102 MG/DL (ref 65–140)
HCT VFR BLD AUTO: 23.8 % (ref 36.5–49.3)
HGB BLD-MCNC: 7.6 G/DL (ref 12–17)
IMM GRANULOCYTES # BLD AUTO: 0.02 THOUSAND/UL (ref 0–0.2)
IMM GRANULOCYTES NFR BLD AUTO: 0 % (ref 0–2)
LYMPHOCYTES # BLD AUTO: 1.4 THOUSANDS/ΜL (ref 0.6–4.47)
LYMPHOCYTES NFR BLD AUTO: 22 % (ref 14–44)
MCH RBC QN AUTO: 29.2 PG (ref 26.8–34.3)
MCHC RBC AUTO-ENTMCNC: 31.9 G/DL (ref 31.4–37.4)
MCV RBC AUTO: 92 FL (ref 82–98)
MONOCYTES # BLD AUTO: 0.6 THOUSAND/ΜL (ref 0.17–1.22)
MONOCYTES NFR BLD AUTO: 10 % (ref 4–12)
NEUTROPHILS # BLD AUTO: 4.05 THOUSANDS/ΜL (ref 1.85–7.62)
NEUTS SEG NFR BLD AUTO: 63 % (ref 43–75)
NRBC BLD AUTO-RTO: 0 /100 WBCS
PLATELET # BLD AUTO: 197 THOUSANDS/UL (ref 149–390)
PMV BLD AUTO: 10.8 FL (ref 8.9–12.7)
POTASSIUM SERPL-SCNC: 3.4 MMOL/L (ref 3.5–5.3)
PROT SERPL-MCNC: 5.5 G/DL (ref 6.4–8.2)
RBC # BLD AUTO: 2.6 MILLION/UL (ref 3.88–5.62)
SODIUM SERPL-SCNC: 141 MMOL/L (ref 136–145)
WBC # BLD AUTO: 6.34 THOUSAND/UL (ref 4.31–10.16)

## 2022-05-12 PROCEDURE — 99232 SBSQ HOSP IP/OBS MODERATE 35: CPT | Performed by: HOSPITALIST

## 2022-05-12 PROCEDURE — 99232 SBSQ HOSP IP/OBS MODERATE 35: CPT | Performed by: INTERNAL MEDICINE

## 2022-05-12 PROCEDURE — 80053 COMPREHEN METABOLIC PANEL: CPT | Performed by: HOSPITALIST

## 2022-05-12 PROCEDURE — 85025 COMPLETE CBC W/AUTO DIFF WBC: CPT | Performed by: HOSPITALIST

## 2022-05-12 RX ORDER — POTASSIUM CHLORIDE 20 MEQ/1
40 TABLET, EXTENDED RELEASE ORAL ONCE
Status: COMPLETED | OUTPATIENT
Start: 2022-05-12 | End: 2022-05-12

## 2022-05-12 RX ORDER — FUROSEMIDE 10 MG/ML
60 INJECTION INTRAMUSCULAR; INTRAVENOUS ONCE
Status: COMPLETED | OUTPATIENT
Start: 2022-05-12 | End: 2022-05-12

## 2022-05-12 RX ADMIN — POTASSIUM CHLORIDE 40 MEQ: 1500 TABLET, EXTENDED RELEASE ORAL at 08:46

## 2022-05-12 RX ADMIN — DULOXETINE 60 MG: 30 CAPSULE, DELAYED RELEASE ORAL at 08:45

## 2022-05-12 RX ADMIN — PANTOPRAZOLE SODIUM 40 MG: 40 TABLET, DELAYED RELEASE ORAL at 06:32

## 2022-05-12 RX ADMIN — TAMSULOSIN HYDROCHLORIDE 0.4 MG: 0.4 CAPSULE ORAL at 17:19

## 2022-05-12 RX ADMIN — PRAVASTATIN SODIUM 40 MG: 40 TABLET ORAL at 17:19

## 2022-05-12 RX ADMIN — ACETAMINOPHEN 650 MG: 325 TABLET, FILM COATED ORAL at 04:09

## 2022-05-12 RX ADMIN — ONDANSETRON 4 MG: 2 INJECTION INTRAMUSCULAR; INTRAVENOUS at 21:45

## 2022-05-12 RX ADMIN — FUROSEMIDE 60 MG: 10 INJECTION, SOLUTION INTRAMUSCULAR; INTRAVENOUS at 14:02

## 2022-05-12 RX ADMIN — DOCUSATE SODIUM 100 MG: 100 CAPSULE, LIQUID FILLED ORAL at 08:46

## 2022-05-12 RX ADMIN — DOXYCYCLINE 100 MG: 100 CAPSULE ORAL at 08:45

## 2022-05-12 RX ADMIN — METHOCARBAMOL TABLETS 750 MG: 500 TABLET, COATED ORAL at 04:09

## 2022-05-12 RX ADMIN — METOPROLOL SUCCINATE 100 MG: 50 TABLET, EXTENDED RELEASE ORAL at 08:45

## 2022-05-12 RX ADMIN — AMLODIPINE BESYLATE 5 MG: 5 TABLET ORAL at 08:46

## 2022-05-12 RX ADMIN — ACETAMINOPHEN 650 MG: 325 TABLET, FILM COATED ORAL at 21:45

## 2022-05-12 RX ADMIN — DOXYCYCLINE 100 MG: 100 CAPSULE ORAL at 20:32

## 2022-05-12 RX ADMIN — GABAPENTIN 100 MG: 100 CAPSULE ORAL at 08:45

## 2022-05-12 RX ADMIN — PANTOPRAZOLE SODIUM 40 MG: 40 TABLET, DELAYED RELEASE ORAL at 17:19

## 2022-05-12 RX ADMIN — LIDOCAINE 5% 1 PATCH: 700 PATCH TOPICAL at 21:43

## 2022-05-12 RX ADMIN — GABAPENTIN 300 MG: 300 CAPSULE ORAL at 21:42

## 2022-05-12 NOTE — PLAN OF CARE
Problem: PAIN - ADULT  Goal: Verbalizes/displays adequate comfort level or baseline comfort level  Description: Interventions:  - Encourage patient to monitor pain and request assistance  - Assess pain using appropriate pain scale  - Administer analgesics based on type and severity of pain and evaluate response  - Implement non-pharmacological measures as appropriate and evaluate response  - Consider cultural and social influences on pain and pain management  - Notify physician/advanced practitioner if interventions unsuccessful or patient reports new pain  Outcome: Progressing     Problem: INFECTION - ADULT  Goal: Absence or prevention of progression during hospitalization  Description: INTERVENTIONS:  - Assess and monitor for signs and symptoms of infection  - Monitor lab/diagnostic results  - Monitor all insertion sites, i e  indwelling lines, tubes, and drains  - Monitor endotracheal if appropriate and nasal secretions for changes in amount and color  - [place] appropriate cooling/warming therapies per order  - Administer medications as ordered  - Instruct and encourage patient and family to use good hand hygiene technique  - Identify and instruct in appropriate isolation precautions for identified infection/condition  Outcome: Progressing     Problem: SAFETY ADULT  Goal: Patient will remain free of falls  Description: INTERVENTIONS:  - Educate patient/family on patient safety including physical limitations  - Instruct patient to call for assistance with activity   - Consult OT/PT to assist with strengthening/mobility   - Keep Call bell within reach  - Keep bed low and locked with side rails adjusted as appropriate  - Keep care items and personal belongings within reach  - Initiate and maintain comfort rounds  - Make Fall Risk Sign visible to staff  - Offer Toileting every 2 Hours, in advance of need  - Initiate/Maintain alarm  - Obtain necessary fall risk management equipment:   - Apply yellow socks and bracelet for high fall risk patients  - Consider moving patient to room near nurses station  Outcome: Progressing

## 2022-05-12 NOTE — PROGRESS NOTES
[place]  Progress Note - Nolan Arriaza 1950, 71 y o  male MRN: [id number]  Unit/Bed#: -01 Encounter: [id number]  Primary Care Provider: Mikael Daen MD   Date and time admitted to hospital: 5/10/2022  6:51 PM    Hypomagnesemia  Assessment & Plan  · Magnesium 1 4 on admission  · Ordered 1 g IV magnesium   · Continue to monitor and replete as needed    Hypokalemia  Assessment & Plan  · Potassium 2 8 on admission  · Ordered 40 mEq p o  potassium and 20 mEq IV potassium   · On telemetry   · Continue to monitor and replete as needed    Chronic diastolic (congestive) heart failure (HCC)  Assessment & Plan  Wt Readings from Last 3 Encounters:   05/12/22 109 kg (239 lb 11 2 oz)   05/03/22 114 kg (251 lb)   04/29/22 113 kg (250 lb 1 6 oz)     · Prior echo 04/25/2022:  EF 08%, Diastolic function: grade 2 pseudonormal relaxation  · Monitor I/O and daily weights  · Does not take outpatient diuretics  IV Lasix with minimal results during prior admission  Patient had received albumin to help with anasarca  · Fluid restriction, 2 g sodium    Glomerulonephritis  Assessment & Plan  · Diagnosed during prior admission  · Biopsy was not performed during prior admit  · Postinfectious suspected  · Treatment per nephrology     Surgical site infection  Assessment & Plan  · S/p cervical fusion performed by Dr Jose L Henderson on 3/35/48, complicated by MRSA infection  · Completed 24 days of IV vancomycin, transitioned to daptomycin on 04/24-4/29  Discharged home on doxycycline 100 mg b i d    · Continue doxycycline  · apprec ID, not felt to be contributing to PIGN     Anemia  Assessment & Plan  · Hemoglobin on admission:  7 9  · Patient with hematuria    Believed to be due to GN  · During prior admission, new normocytic anemia, hemoglobin had been 6 6, received 1 unit of PRBCs on 04/22  · FOBT was negative  · Transfuse if hemoglobin less than 7    Weakness  Assessment & Plan  · Reports ongoing PT placed on cardiac monitor      [facility]  09/27/20 6243 weakness and fatigue  Per wife patient spends majority of the day napping  · Denies recent falls  · Suspect ongoing weakness/fatigue due to ongoing infection, JANEL, GN and deconditioning from frequent hospital admissions  · Consult PT/OT    Mixed hyperlipidemia  Assessment & Plan  · Continue statin    Paroxysmal A-fib (HCC)  Assessment & Plan  · Home regimen: metoprolol succinate 100 mg daily and flecainide 100 mg b i d   · Continue metoprolol  flecanide on hold by cards d/t variable renal function  · Coumadin 2 5 mg 5x weekly, 5 mg 2x weekly  · INR 4 0 on 05/09  Outpatient provider had instructed patient to hold dose on 5/9 and 5/10  · INR on admission on 05/10 was 3 15  Continue hold of Coumadin  · INR goal 2-3     Depression, major, single episode, moderate (HCC)  Assessment & Plan  · Continue Cymbalta    Factor V Leiden mutation ([facility] )  Assessment & Plan  · Prescribed Coumadin  · INR 4 0 on 05/09  Outpatient provider had instructed patient to hold dose on 5/9 and 5/10  · INR on admission on 05/10 was 3 15  Continue hold of Coumadin, INR increased this AM  · INR goal 2-3    WILL (obstructive sleep apnea)  Assessment & Plan  · CPAP HS - patient brought home machine in    Essential hypertension  Assessment & Plan  · Home regimen:  Amlodipine 5 mg daily and metoprolol succinate 100 mg daily    * JANEL (acute kidney injury) ([facility] )  Assessment & Plan  · Creatinine on admission: 5 04  · Direct admit due to rising creatinine outpatient  · Bilateral lower extremity 2+ pitting edema  · Prior admission: admitted on 04/22,  Discharged on 04/29 with creatinine 3 85  · Believed to be due to volume overload, postinfectious GN, Staph aureus bacteremia  · Renal biopsy was not performed  · Baseline prior to this event was 0 6-0 9  · Fluid restriction, monitor I/O  · Avoid nephrotoxins, hypotension, IV contrast  · Consult nephrology - await recommendations  No clear indications for emergency HD at this time    · Started on diuresis with good output  Cr about stable at 5  Hypoalbuminemia-resolved as of 2022  Assessment & Plan  · Albumin 2 1 on admission   · Patient with bilateral lower extremity swelling  Suspect anasarca   · Ordered 1 dose of IV albumin  · Nephrology consulted       VTE  Prophylaxis:   Pharmacologic: in place    Patient Centered Rounds: I have performed bedside rounds with nursing staff today  Discussions with Specialists or Other Care Team Provider: case management           Current Length of Stay: 2 day(s)    Current Patient Status: Inpatient        Code Status: Level 3 - DNAR and DNI      Subjective:   Pt seen  Reports urinating well  Had a muscle spasm on R side  Lidocaine patch helping  Patient is seen and examined at bedside  All other ROS are negative  Objective:     Vitals:   Temp (24hrs), Av 3 °F (36 3 °C), Min:97 3 °F (36 3 °C), Max:97 4 °F (36 3 °C)    Temp:  [97 3 °F (36 3 °C)-97 4 °F (36 3 °C)] 97 3 °F (36 3 °C)  HR:  [53-56] 53  Resp:  [16-18] 18  BP: (148-163)/(77-84) 157/80  SpO2:  [92 %-96 %] 96 %  Body mass index is 33 43 kg/m²  Input and Output Summary (last 24 hours): Intake/Output Summary (Last 24 hours) at 2022 1429  Last data filed at 2022 1401  Gross per 24 hour   Intake 480 ml   Output 2870 ml   Net -2390 ml       Physical Exam:       GEN: No acute distress, comfortable  In c collar  HEEENT: No JVD, PERRLA, no scleral icterus  RESP: Lungs clear to auscultation bilaterally  CV: RRR, +s1/s2   ABD: SOFT NON TENDER, POSITIVE BOWEL SOUNDS, NO DISTENTION  PSYCH: CALM  NEURO: A X O X 3, NO FOCAL DEFICITS  SKIN: NO RASH  EXTREM:  EDEMA le noted      Additional Data:     Labs:    Results from last 7 days   Lab Units 22  0349   WBC Thousand/uL 6 34   HEMOGLOBIN g/dL 7 6*   HEMATOCRIT % 23 8*   PLATELETS Thousands/uL 197   NEUTROS PCT % 63   LYMPHS PCT % 22   MONOS PCT % 10   EOS PCT % 4     Results from last 7 days   Lab Units 22  0349   SODIUM mmol/L 141   POTASSIUM mmol/L 3 4*   CHLORIDE mmol/L 104   CO2 mmol/L 28   BUN mg/dL 39*   CREATININE mg/dL 5 11*   ANION GAP mmol/L 9   CALCIUM mg/dL 8 1*   ALBUMIN g/dL 2 0*   TOTAL BILIRUBIN mg/dL 0 60   ALK PHOS U/L 81   ALT U/L 9*   AST U/L 14   GLUCOSE RANDOM mg/dL 102     Results from last 7 days   Lab Units 05/11/22  0334   INR  3 18*             Results from last 7 days   Lab Units 05/10/22  1949   LACTIC ACID mmol/L 1 4           * I Have Reviewed All Lab Data Listed Above  Imaging:     Results for orders placed during the hospital encounter of 04/22/22    XR chest 1 view portable    Addendum 4/22/2022  8:39 PM  ADDENDUM:    PICC line tip projects over the region of the mid SVC  Narrative  CHEST    INDICATION:   weakness  COMPARISON:  None    EXAM PERFORMED/VIEWS:  XR CHEST PORTABLE      FINDINGS:    Cardiomediastinal silhouette appears unremarkable  The lungs are clear  No pneumothorax or pleural effusion  Osseous structures appear within normal limits for patient age  Impression  No acute cardiopulmonary disease  Workstation performed: [id number]    No results found for this or any previous visit  *I have reviewed all imaging reports listed above      Recent Cultures (last 7 days):     Results from last 7 days   Lab Units 05/10/22  2154 05/10/22  2022 05/10/22  1953   BLOOD CULTURE   --  No Growth at 24 hrs  No Growth at 24 hrs     URINE CULTURE  No Growth <1000 cfu/mL  --   --        Last 24 Hours Medication List:   Current Facility-Administered Medications   Medication Dose Route Frequency Provider Last Rate    acetaminophen  650 mg Oral Q6H PRN Moreno Willis PA-C      amLODIPine  5 mg Oral Daily Moreno Willis PA-C      docusate sodium  100 mg Oral Daily Moreno Willis PA-C      doxycycline hyclate  100 mg Oral Q12H [address] Moreno Willis PA-C      DULoxetine  60 mg Oral Daily Moreno Willis PA-C      gabapentin  100 mg Oral Daily Moreno Willis PA-C      gabapentin  300 mg Oral HS Reanna Strong PA-C      lidocaine  1 patch Topical HS Annette Flores PA-C      methocarbamol  750 mg Oral Q6H PRN Reanna Strong PA-C      metoprolol succinate  100 mg Oral Daily Reanna Strong PA-C      ondansetron  4 mg Intravenous Q4H PRN Annette Flores PA-C      pantoprazole  40 mg Oral BID AC Jonelle Lou PA-C      pravastatin  40 mg Oral Daily With Dinner Reanna Strong PA-C      tamsulosin  0 4 mg Oral Daily With Eugene Mcqueen PA-C          Today, Patient Was Seen By: Beatriz June MD    ** Please Note: Dictation voice to text software may have been used in the creation of this document   **

## 2022-05-12 NOTE — PROGRESS NOTES
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; seen by Nephrology at that time who recommended fluid restriction and albumin; creat on discharge was 3 8  · Creat on admit 5 0   · Evaluated by Nephrology and IV lasix 80 mg x 1 dose given yesterday with good diuretic response; creat this AM is now 5 1  · Plan for additional IV lasix 60 mg x 1 dose today and start PO diuretics tomorrow  · Worsening renal failure may be underlying cause to volume overload/LE edema in conjunction with hypoalbuminemia as opposed to acute CHF     Paroxysmal afib/aflutter  · Hx of aflutter ablation 2015  · Home meds: flecainide 100 mg BID, Toprol  mg QD  · EKG yesterday AM is sinus bradycardia with 1st degree AV block and RBBB rate of 56  · In setting of him being on flecainide and having JANEL, QRS duration was assessed; he has hx of RBBB and QRS in the past has been <200; yesterday AM his QRS is 218, we will hold flecainide at this time and can reconsider starting prior to d/c depending on his renal function  · Patient is not on tele but rhythm is regular by physical exam      Hypokalemia  · K+ 2 8 on admit; repleted by SLIM  · K+ this AM 3 4, continue to replete; goal 4 0 in setting of PAF     Diastolic HF  · Chronic B/L LE edema noted by Dr Miguel Martinez in last office note; Dr Miguel Martinez felt likely secondary to hypoalbuminemia (2 1 on admit); IV lasix trialed last admit with minimal response  · TTE from 4/2022: EF 55%; no WMA; grade 2DD; RV normal size and function; mild MR/TR  · Not on PO diuretic as outpatient  · See JANEL/renal failure above     Essential HTN  · SBP averaging 160's  · Home meds: amlodipine 5 mg QD, Toprol  mg QD     HLD  · Pravastatin 40 mg QD     Factor V Leiden  · On Coumadin; INR on admit 3 1  · INR from this AM is pending     WILL  · Compliant with CPAP     Surgical site infection- s/p cervical fusion 3/11/7663 complicated by MRSA        Plan/Recommendations:  · Continue to hold flecainide; will review with Cardiology attending if we should restart prior to discharge vs  Chose another anti-arrhythmic  · Continue remainder of home meds  · IV/PO diuretics per Nephrology     _________________________________________________________________    Subjective  Patient notes some mild back pain but denies any shortness of breath at rest   No orthopnea  Patient notes continued lower extremity edema but states it is at his baseline  Review of Systems   Constitutional: Negative for chills, fever and malaise/fatigue  HENT: Negative for congestion  Cardiovascular: Negative for chest pain, dyspnea on exertion, leg swelling, orthopnea and palpitations  Respiratory: Negative for cough, shortness of breath (no SOB at rest) and wheezing  Endocrine: Negative  Hematologic/Lymphatic: Negative  Skin: Negative  Musculoskeletal: Positive for back pain  Gastrointestinal: Negative for bloating, abdominal pain, nausea and vomiting  Genitourinary: Negative  Neurological: Negative for dizziness and light-headedness  Psychiatric/Behavioral: Negative  All other systems reviewed and are negative  Objective:   Vitals: Blood pressure 163/83, pulse 56, temperature (!) 97 3 °F (36 3 °C), resp  rate 18, weight 109 kg (239 lb 11 2 oz), SpO2 92 %  ,     Wt Readings from Last 3 Encounters:   05/12/22 109 kg (239 lb 11 2 oz)   05/03/22 114 kg (251 lb)   04/29/22 113 kg (250 lb 1 6 oz)        Lab Results   Component Value Date    CREATININE 5 11 (H) 05/12/2022    CREATININE 4 99 (H) 05/11/2022    CREATININE 5 00 (H) 05/11/2022         Body mass index is 33 43 kg/m²  ,     Systolic (72XKK), KKB:344 , Min:148 , ZFE:295     Diastolic (17CJV), OFS:97, Min:77, Max:84          Intake/Output Summary (Last 24 hours) at 5/12/2022 8000 Judy Avendaño filed at 2022 1101  Gross per 24 hour   Intake 480 ml   Output 2650 ml   Net -2170 ml     Weight (last 2 days)     Date/Time Weight    22 0600 109 (239 7)    05/10/22 1929 114 (251 32)    05/10/22 18:56:25 114 (251 32)            Telemetry Review: Not on tele        Physical Exam  Vitals reviewed  Constitutional:       General: He is not in acute distress  HENT:      Head: Normocephalic and atraumatic  Mouth/Throat:      Mouth: Mucous membranes are moist    Cardiovascular:      Rate and Rhythm: Regular rhythm  Bradycardia present  Heart sounds: Normal heart sounds, S1 normal and S2 normal  No murmur heard  Pulmonary:      Effort: Pulmonary effort is normal  No respiratory distress  Breath sounds: Normal breath sounds  Abdominal:      General: Bowel sounds are normal  There is no distension  Palpations: Abdomen is soft  Musculoskeletal:         General: Normal range of motion  Cervical back: Normal range of motion and neck supple  Right lower le+ Edema present  Left lower le+ Edema present  Skin:     General: Skin is warm and dry  Neurological:      Mental Status: He is alert and oriented to person, place, and time     Psychiatric:         Mood and Affect: Mood normal          LABORATORY RESULTS      CBC with diff:   Results from last 7 days   Lab Units 22  0349 22  0334 05/10/22  1949   WBC Thousand/uL 6 34 5 31 6 08   HEMOGLOBIN g/dL 7 6* 7 1* 7 9*   HEMATOCRIT % 23 8* 22 3* 24 8*   MCV fL 92 91 91   PLATELETS Thousands/uL 197 192 237   MCH pg 29 2 29 0 28 9   MCHC g/dL 31 9 31 8 31 9   RDW % 14 4 14 4 14 2   MPV fL 10 8 10 1 10 2   NRBC AUTO /100 WBCs 0 0 0       CMP:  Results from last 7 days   Lab Units 22  0349 22  1338 22  0334 05/10/22  1950   POTASSIUM mmol/L 3 4* 3 7 3 2* 2 8*   CHLORIDE mmol/L 104 103 102 100   CO2 mmol/L 28 27 26 27   BUN mg/dL 39* 36* 35* 35*   CREATININE mg/dL 5 11* 4 99* 5 00* 5 04*   CALCIUM mg/dL 8 1* 7 9* 8 1* 7 9*   AST U/L 14  --   --  14   ALT U/L 9*  --   --  10*   ALK PHOS U/L 81  --   --  94   EGFR ml/min/1 73sq m 10 10 10 10       BMP:  Results from last 7 days   Lab Units 22  0349 22  1338 22  0334 05/10/22  1950   POTASSIUM mmol/L 3 4* 3 7 3 2* 2 8*   CHLORIDE mmol/L 104 103 102 100   CO2 mmol/L 28 27 26 27   BUN mg/dL 39* 36* 35* 35*   CREATININE mg/dL 5 11* 4 99* 5 00* 5 04*   CALCIUM mg/dL 8 1* 7 9* 8 1* 7 9*       Lab Results   Component Value Date    NTBNP 3,948 (H) 2022    NTBNP 121 2022             Results from last 7 days   Lab Units 05/10/22  1950   MAGNESIUM mg/dL 1 4*                     Results from last 7 days   Lab Units 22  0334 05/10/22  1951 22  0000   INR  3 18* 3 15* 4 00*       Lipid Profile:   Lab Results   Component Value Date    CHOL 151 10/25/2017    CHOL 169 2016    CHOL 176 09/10/2015     Lab Results   Component Value Date    HDL 50 2022    HDL 47 2021    HDL 65 2020     Lab Results   Component Value Date    LDLCALC 123 (H) 2022    LDLCALC 107 (H) 2021    LDLCALC 128 (H) 2020     Lab Results   Component Value Date    TRIG 164 (H) 2022    TRIG 181 (H) 2021    TRIG 116 2020       Cardiac testing:   Results for orders placed during the hospital encounter of 18    Echo complete with contrast if indicated    Narrative  [facility]  [facility], [address] [place] [address]  [phone]    Transthoracic Echocardiogram  2D, M-mode, Doppler, and Color Doppler    Study date:  2018    Patient: Eduardo Brennan  MR number: [id number]  Account number: [de-identified]  : 1950  Age: 80 years  Gender: Male  Status: Outpatient  Location: [address] [place] [address] [facility]  Height: 72 in  Weight: 277 4 lb  BP: 116/ 78 mmHg    Indications: Atrial fibrillation      Diagnoses: I48 0 - Atrial fibrillation    Sonographer: KAY Boland RDCS RVT  Primary Physician:  Cesar Dudley MD  Referring Physician:  Mildred Reyes MD  Group:  [id number]  Interpreting Physician:  Brionna Reddy MD    SUMMARY    LEFT VENTRICLE:  Systolic function was mildly reduced by visual assessment  Ejection fraction was estimated to be 45 %  There was mild diffuse hypokinesis  Wall thickness was mildly increased  LEFT ATRIUM:  The atrium was mildly dilated  RIGHT ATRIUM:  The atrium was mildly dilated  MITRAL VALVE:  There was mild regurgitation  TRICUSPID VALVE:  There was mild regurgitation  HISTORY: PRIOR HISTORY: Hyperlipidemia, Coumadin, Sleep apnea  PRIOR PROCEDURES: Arrhythmia ablation  PROCEDURE: The study was performed in the [facility]  This was a routine study  The transthoracic approach was used  The study included complete 2D imaging, M-mode, complete spectral Doppler, and color Doppler  Images were obtained from the parasternal, apical, subcostal, and suprasternal notch acoustic windows  Echocardiographic views were limited due to decreased penetration and lung interference  This was a technically difficult study  LEFT VENTRICLE: Size was normal  Systolic function was mildly reduced by visual assessment  Ejection fraction was estimated to be 45 %  There was mild diffuse hypokinesis  Wall thickness was mildly increased  DOPPLER: Transmitral flow  pattern: atrial fibrillation  RIGHT VENTRICLE: The size was normal  Systolic function was normal  DOPPLER: Systolic pressure was not estimated  LEFT ATRIUM: The atrium was mildly dilated  RIGHT ATRIUM: The atrium was mildly dilated  MITRAL VALVE: Valve structure was normal  There was normal leaflet separation  DOPPLER: The transmitral velocity was within the normal range  There was no evidence for stenosis  There was mild regurgitation  AORTIC VALVE: The valve was trileaflet   Leaflets exhibited normal thickness, mild calcification, and normal cuspal separation  DOPPLER: Transaortic velocity was within the normal range  There was no evidence for stenosis  There was no  regurgitation  TRICUSPID VALVE: The valve structure was normal  There was normal leaflet separation  DOPPLER: The transtricuspid velocity was within the normal range  There was no evidence for stenosis  There was mild regurgitation  PULMONIC VALVE: Leaflets exhibited normal thickness, no calcification, and normal cuspal separation  DOPPLER: The transpulmonic velocity was within the normal range  There was no regurgitation  PERICARDIUM: There was no pericardial effusion  AORTA: The root exhibited normal size  SYSTEMIC VEINS: IVC: The inferior vena cava was normal in size and course  Respirophasic changes were normal     SYSTEM MEASUREMENT TABLES    2D  %FS: 32 47 %  Ao Diam: 3 04 cm  EDV(Teich): 131 46 ml  EF(Teich): 60 39 %  ESV(Cube): 44 15 ml  ESV(Teich): 52 08 ml  IVSd: 1 17 cm  LA Area: 25 58 cm2  LA Diam: 5 06 cm  LVEDV MOD A4C: 85 53 ml  LVEF MOD A4C: 57 2 %  LVESV MOD A4C: 36 61 ml  LVIDd: 5 23 cm  LVIDs: 3 53 cm  LVLd A4C: 7 43 cm  LVLs A4C: 6 23 cm  LVPWd: 1 17 cm  RA Area: 20 53 cm2  RV Diam : 4 cm  SI(Cube): 40 49 ml/m2  SI(Teich): 32 4 ml/m2  SV MOD A4C: 48 92 ml  SV(Cube): 99 21 ml  SV(Teich): 79 38 ml    MM  TAPSE: 2 06 cm    PW  E': 0 08 m/s    [employer] Accredited Echocardiography Laboratory    Prepared and electronically signed by    Justa Rosado MD  Signed 03-Apr-2018 12:00:40    No results found for this or any previous visit  No results found for this or any previous visit  No valid procedures specified    Results for orders placed during the hospital encounter of 09/21/18    NM myocardial perfusion spect (stress and/or rest)    [address]    Stress Gated SPECT Myocardial Perfusion Imaging after Exercise    Patient: KAMERON EVERETT PEACE  MR number: [id number]  Account number: [de-identified]  : 1950  Age: 80 years  Gender: Male  Status: Outpatient  Location: [address] [place] [address]  Height: 72 in  Weight: 258 lb  BP: 116/ 93 mmHg    Allergies: MORPHINE    Diagnosis: I48 1 - Atrial flutter    Primary Physician:  Ramirez Girard MD  RN:  Zara Zhou  Referring Physician:  Emily Field MD  Technician:  Jameel Pryoreeters:  [facility]  Interpreting Physician:  Emily Field MD    HISTORY: Factor V Leiden, Ablation, WILL, Cardioversion  The patient is a 80year old  male  Chest pain status: no chest pain  Coronary artery disease risk factors: dyslipidemia and family history of premature coronary artery  disease  Cardiovascular history: arrhythmia  Co-morbidity: obesity  Previous test results: abnormal ECG  PHYSICAL EXAM: Baseline physical exam screening: no wheezes audible  REST ECG: Atrial fibrillation  PROCEDURE: The study was performed in the the [address] [place] [address]  Treadmill exercise testing was performed, using the Nirmal protocol  Gated SPECT myocardial perfusion imaging was performed during stress  Systolic blood  pressure was 116 mmHg, at the start of the study  Diastolic blood pressure was 93 mmHg, at the start of the study  The heart rate was 87 bpm, at the start of the study  IV double checked  NIRMAL PROTOCOL:  HR bpm SBP mmHg DBP mmHg Symptoms  Baseline 87 116 93 none  Stage 1 120 100 71 leg pain  Stage 2 144 144 72 fatigue, leg pain  Recovery 1 137 174 93 subsiding  Recovery 2 136 176 99 subsiding  Recovery 3 136 148 97 none  Recovery 5 115 137 99 none  No medications or fluids given  STRESS SUMMARY: 02 sat 97% baseline, 97% peak  Duration of exercise was 6 min and 0 sec  The patient exercised to protocol stage 2  Maximal work rate was 7 METs  Functional capacity was slightly decreased (20% to 30%)   Maximal heart rate  during stress was 164 bpm ( 107 % of maximal predicted heart rate)  The heart rate response to stress was normal  There was normal resting blood pressure with an appropriate response to stress  The rate-pressure product for the peak heart  rate and blood pressure was 70652  There was no chest pain during stress  The stress test was terminated due to leg pain and fatigue  The stress ECG was negative for ischemia and normal  Arrhythmia during stress: Persistence of atrial  fibrillation throughout the study  ISOTOPE ADMINISTRATION:  Resting isotope administration Stress isotope administration  Agent Tetrofosmin Tetrofosmin  Dose 15 8 mCi 48 2 mCi  Date 09/21/2018 09/21/2018  Injection-image interval 44 min 41 min    The radiopharmaceutical was injected one minute before the end of exercise  MYOCARDIAL PERFUSION IMAGING:  The image quality was good  Rotating projection images reveal mild diaphragmatic attenuation  Left ventricular size was normal  The TID ratio was 0 88  PERFUSION DEFECTS:  -  There were no significant perfusion defects  PERFUSION QUANTITATION:  The summed perfusion score measured 0 during stress  GATED SPECT:  The calculated left ventricular ejection fraction was 61 %  Left ventricular ejection fraction was within normal limits by visual estimate  There was no left ventricular regional abnormality  SUMMARY:  -  Stress results: Duration of exercise was 6 min and 0 sec  Maximal work rate was 7 METs  Functional capacity was slightly decreased (20% to 30%)  Target heart rate was achieved  There was no chest pain during stress  -  ECG conclusions: The stress ECG was negative for ischemia and normal  Arrhythmia during stress: Persistence of atrial fibrillation throughout the study  -  Perfusion imaging: There were no significant perfusion defects   -  Gated SPECT: The calculated left ventricular ejection fraction was 61 %  Left ventricular ejection fraction was within normal limits by visual estimate  There was no left ventricular regional abnormality  IMPRESSIONS: Normal study after maximal exercise  Myocardial perfusion imaging was normal at rest and with stress   Left ventricular systolic function was normal     Prepared and signed by    Lisa Montes MD  Signed 09/21/2018 13:46:16        Meds/Allergies   current meds:   Current Facility-Administered Medications   Medication Dose Route Frequency    acetaminophen (TYLENOL) tablet 650 mg  650 mg Oral Q6H PRN    amLODIPine (NORVASC) tablet 5 mg  5 mg Oral Daily    docusate sodium (COLACE) capsule 100 mg  100 mg Oral Daily    doxycycline hyclate (VIBRAMYCIN) capsule 100 mg  100 mg Oral Q12H [address]    DULoxetine (CYMBALTA) delayed release capsule 60 mg  60 mg Oral Daily    furosemide (LASIX) injection 60 mg  60 mg Intravenous Once    gabapentin (NEURONTIN) capsule 100 mg  100 mg Oral Daily    gabapentin (NEURONTIN) capsule 300 mg  300 mg Oral HS    lidocaine (LIDODERM) 5 % patch 1 patch  1 patch Topical HS    methocarbamol (ROBAXIN) tablet 750 mg  750 mg Oral Q6H PRN    metoprolol succinate (TOPROL-XL) 24 hr tablet 100 mg  100 mg Oral Daily    ondansetron (ZOFRAN) injection 4 mg  4 mg Intravenous Q4H PRN    pantoprazole (PROTONIX) EC tablet 40 mg  40 mg Oral BID AC    pravastatin (PRAVACHOL) tablet 40 mg  40 mg Oral Daily With Dinner    tamsulosin (FLOMAX) capsule 0 4 mg  0 4 mg Oral Daily With Dinner     Medications Prior to Admission   Medication    acetaminophen (TYLENOL) 325 mg tablet    amLODIPine (NORVASC) 5 mg tablet    docusate sodium (COLACE) 100 mg capsule    doxycycline (ADOXA) 100 MG tablet    DULoxetine (CYMBALTA) 60 mg delayed release capsule    flecainide (TAMBOCOR) 100 mg tablet    gabapentin (NEURONTIN) 100 mg capsule    gabapentin (NEURONTIN) 300 mg capsule    methocarbamol (ROBAXIN) 750 mg tablet    metoprolol succinate (TOPROL-XL) 100 mg 24 hr tablet    ondansetron (ZOFRAN) 4 mg tablet    pantoprazole (PROTONIX) 40 mg tablet    pravastatin (PRAVACHOL) 40 mg tablet    tamsulosin (FLOMAX) 0 4 mg    warfarin (COUMADIN) 5 mg tablet    polyethylene glycol (MIRALAX) 17 g packet            Counseling / Coordination of Care  Total floor / unit time spent today 20 minutes  Greater than 50% of total time was spent with the patient and / or family counseling and / or coordination of care  ** Please Note: Dragon 360 Dictation voice to text software may have been used in the creation of this document   **

## 2022-05-12 NOTE — ASSESSMENT & PLAN NOTE
· S/p cervical fusion performed by Dr Jocelyne Melendrez on 9/11/89, complicated by MRSA infection  · Completed 24 days of IV vancomycin, transitioned to daptomycin on 04/24-4/29    Discharged home on doxycycline 100 mg b i d    · Continue doxycycline  · apprec ID, not felt to be contributing to PIGN

## 2022-05-12 NOTE — PROGRESS NOTES
NEPHROLOGY PROGRESS NOTE   Laly Delgado 71 y o  male MRN: [id number]  Unit/Bed#: -01 Encounter: [id number]  Reason for Consult: JANEL      SUMMARY:    25-year-old male, with no prior history of chronic kidney disease, factor V Leiden deficiency, DVT, chronic diastolic congestive heart failure, who underwent cervical fusion on March 84PZ which was complicated by MRSA surgical site infection and osteomyelitis was subsequently developed acute kidney injury in April  Patient was readmitted due to concerns of nausea and worsening renal function  ASSESSMENT and PLAN:    Acute kidney injury/worsening renal function  --baseline creatinine is less than 1 mg/dL  --developed acute kidney injury in April, which was suspected to possibly be related to acute tubular necrosis versus postinfectious glomerular nephritis  --his creatinine plateaued in the high threes upon discharge  --renal function now worsening creatinine at 5 mg/dL and currently raining stable at this time  --BUN is not significantly elevated  --renal ultrasound showed no evidence of hydronephrosis with no echogenicity  --serologies:  Hepatitis panel was nonreactive, complement C3 and C4 within normal limits, anti-double-stranded DNA was negative, ZAK was negative, anti-glomerular basement antibody was negative, ANCA titer were negative, serum protein electrophoresis/UPEP showed no monoclonal bands, hemolysis smear negative for schistocytes  --no symptoms of uremia at this time  --no urgent indication for renal placement therapy  --discussed about a renal biopsy  Given his low hemoglobin and currently being on anticoagulation and given his prior serologies being negative there is low suspicion that the biopsy would change the overall management and could considered be high risk procedure    After discussion with them they would like to hold off on this procedure which I agree with   --good urine output with good response to IV Lasix  --low hemoglobin Will slow renal recovery, transfuse for hemoglobin less than 7  --nephrotic range proteinuria with a urine protein creatinine ratio greater than 18, repeat was 16 5, though may not have been steady state  --received 80 mg IV Lasix yesterday, will give 60 mg IV today, had a good response to diuretics yesterday and put out approximately 2 5 L of urine output  --monitor input and output  --avoid contrast studies, NSAIDs  --no indication for renal ultrasound at this time  --no indication to repeat serologies  --outpatient nephrologist Dr Yesy Kitchen  --plan for IV Lasix today, anticipate starting oral diuretics tomorrow, if the renal function remains stable next 24-48 hours can likely plan for discharge     Hypokalemia  --potassium replaced  --no need for a low-potassium diet, diet liberalized  --IV Lasix     Microscopic hematuria  --suspected to be related to a post infectious glomerular nephritis  --was seen by Urology  --negative serologies  --he is reporting that his urine is becoming less dark and little bit more clear after the dose of IV Lasix     MRSA surgical site infection, OM:  --cervical fusion 3/11/2022 c/b MRSA surgical site infection, osteomyelitis  --s/p debridement and washout 4/1/2022   --previously on vancomycin--> daptomycin course completed 4/29/2022   Remains on oral doxycycline     Diastolic congestive heart failure  --preserved ejection fraction of 55% with grade 2 diastolic impaired relaxation  --excellent response to IV Lasix yesterday, will give 60 mg IV today  --anticipate starting oral diuretics likely tomorrow     Factor V Leiden deficiency:   --hx of associated DVT  --on chronic coumadin for factor V Leiden and atrial fib     Anemia  --chronic  --recent iron study showed iron level 41, ferritin 521, iron saturation 20, TIBC 204  --hemoglobin low but stable  --low hemoglobin Will slow renal recovery  --hemolysis smear showed no schistocytes  --no evidence of thrombocytopenia, less likely TTP      SUBJECTIVE / INTERVAL HISTORY:    Reporting some right lower back pain but otherwise no chest pain or shortness of breath  No nausea or vomiting  OBJECTIVE:  Current Weight: Weight - Scale: 109 kg (239 lb 11 2 oz)  Vitals:    05/11/22 1507 05/11/22 2232 05/12/22 0600 05/12/22 0804   BP: 162/84 148/77  163/83   BP Location:  Right arm     Pulse:  (!) 54  56   Resp:  16  18   Temp: (!) 97 3 °F (36 3 °C) (!) 97 4 °F (36 3 °C)  (!) 97 3 °F (36 3 °C)   TempSrc:  Oral     SpO2:  94%  92%   Weight:   109 kg (239 lb 11 2 oz)        Intake/Output Summary (Last 24 hours) at 5/12/2022 1330  Last data filed at 5/12/2022 1101  Gross per 24 hour   Intake 480 ml   Output 2650 ml   Net -2170 ml       Review of Systems:    12 point ROS has been reviewed  Physical Exam  Vitals and nursing note reviewed  Constitutional:       General: He is not in acute distress  Appearance: He is well-developed  He is not diaphoretic  HENT:      Head: Normocephalic and atraumatic  Eyes:      General: No scleral icterus  Pupils: Pupils are equal, round, and reactive to light  Cardiovascular:      Rate and Rhythm: Normal rate and regular rhythm  Heart sounds: Normal heart sounds  No murmur heard  No friction rub  No gallop  Pulmonary:      Effort: Pulmonary effort is normal  No respiratory distress  Breath sounds: Normal breath sounds  No wheezing or rales  Chest:      Chest wall: No tenderness  Abdominal:      General: Bowel sounds are normal  There is no distension  Palpations: Abdomen is soft  Tenderness: There is no abdominal tenderness  There is no rebound  Musculoskeletal:         General: Swelling present  Normal range of motion  Cervical back: Normal range of motion and neck supple  Right lower leg: Edema present  Left lower leg: Edema present  Skin:     Coloration: Skin is pale  Findings: No rash     Neurological:      Mental Status: He is alert and oriented to person, place, and time           Medications:    Current Facility-Administered Medications:     acetaminophen (TYLENOL) tablet 650 mg, 650 mg, Oral, Q6H PRN, Jonelle Lou PA-C, 650 mg at 05/12/22 0409    amLODIPine (NORVASC) tablet 5 mg, 5 mg, Oral, Daily, Jonelle Lou PA-C, 5 mg at 05/12/22 0846    docusate sodium (COLACE) capsule 100 mg, 100 mg, Oral, Daily, Jonelle Lou PA-C, 100 mg at 05/12/22 0846    doxycycline hyclate (VIBRAMYCIN) capsule 100 mg, 100 mg, Oral, Q12H [address], Jonelle Lou PA-C, 100 mg at 05/12/22 0845    DULoxetine (CYMBALTA) delayed release capsule 60 mg, 60 mg, Oral, Daily, Jonelle Lou PA-C, 60 mg at 05/12/22 0845    furosemide (LASIX) injection 60 mg, 60 mg, Intravenous, Once, Becky Madden MD    gabapentin (NEURONTIN) capsule 100 mg, 100 mg, Oral, Daily, Jonelle Lou PA-C, 100 mg at 05/12/22 0845    gabapentin (NEURONTIN) capsule 300 mg, 300 mg, Oral, HS, Jonelle Lou PA-C, 300 mg at 05/11/22 2226    lidocaine (LIDODERM) 5 % patch 1 patch, 1 patch, Topical, HS, Annette Flores PA-C, 1 patch at 05/11/22 2206    methocarbamol (ROBAXIN) tablet 750 mg, 750 mg, Oral, Q6H PRN, López Walker PA-C, 750 mg at 05/12/22 0409    metoprolol succinate (TOPROL-XL) 24 hr tablet 100 mg, 100 mg, Oral, Daily, Jonelle Lou PA-C, 100 mg at 05/12/22 0845    ondansetron (ZOFRAN) injection 4 mg, 4 mg, Intravenous, Q4H PRN, Annette Flores PA-C, 4 mg at 05/11/22 2206    pantoprazole (PROTONIX) EC tablet 40 mg, 40 mg, Oral, BID AC, Jonelle Lou PA-C, 40 mg at 05/12/22 9529    pravastatin (PRAVACHOL) tablet 40 mg, 40 mg, Oral, Daily With Dinner, VEENA Freeman-C, 40 mg at 05/11/22 1615    tamsulosin (FLOMAX) capsule 0 4 mg, 0 4 mg, Oral, Daily With Dinner, VEENA Freeman-ALTAGRACIA, 0 4 mg at 05/11/22 1616    Laboratory Results:  Results from last 7 days   Lab Units 05/12/22  0349 05/11/22  1338 05/11/22  0334 05/10/22  1950 05/10/22  1949   WBC Thousand/uL 6 34  --  5 31  --  6 08   HEMOGLOBIN g/dL 7 6*  --  7 1*  --  7 9* HEMATOCRIT % 23 8*  --  22 3*  --  24 8*   PLATELETS Thousands/uL 197  --  192  --  237   POTASSIUM mmol/L 3 4* 3 7 3 2* 2 8*  --    CHLORIDE mmol/L 104 103 102 100  --    CO2 mmol/L 28 27 26 27  --    BUN mg/dL 39* 36* 35* 35*  --    CREATININE mg/dL 5 11* 4 99* 5 00* 5 04*  --    CALCIUM mg/dL 8 1* 7 9* 8 1* 7 9*  --    MAGNESIUM mg/dL  --   --   --  1 4*  --    PHOSPHORUS mg/dL  --   --   --  5 4*  --        PLEASE NOTE:  This encounter was completed utilizing the [email or web address] Direct Speech Voice Recognition Software  Grammatical errors, random word insertions, pronoun errors and incomplete sentences are occasional consequences of the system due to software limitations, ambient noise and hardware issues  These may be missed by proof reading prior to affixing electronic signature  Any questions or concerns about the content, text or information contained within the body of this dictation should be directly addressed to the physician for clarification  Please do not hesitate to call me directly if you have any any questions or concerns

## 2022-05-12 NOTE — ASSESSMENT & PLAN NOTE
Wt Readings from Last 3 Encounters:   05/12/22 109 kg (239 lb 11 2 oz)   05/03/22 114 kg (251 lb)   04/29/22 113 kg (250 lb 1 6 oz)     · Prior echo 04/25/2022:  EF 82%, Diastolic function: grade 2 pseudonormal relaxation  · Monitor I/O and daily weights  · Does not take outpatient diuretics  IV Lasix with minimal results during prior admission    Patient had received albumin to help with anasarca  · Fluid restriction, 2 g sodium

## 2022-05-12 NOTE — PLAN OF CARE
Problem: PAIN - ADULT  Goal: Verbalizes/displays adequate comfort level or baseline comfort level  Description: Interventions:  - Encourage patient to monitor pain and request assistance  - Assess pain using appropriate pain scale  - Administer analgesics based on type and severity of pain and evaluate response  - Implement non-pharmacological measures as appropriate and evaluate response  - Consider cultural and social influences on pain and pain management  - Notify physician/advanced practitioner if interventions unsuccessful or patient reports new pain  Outcome: Progressing     Problem: INFECTION - ADULT  Goal: Absence or prevention of progression during hospitalization  Description: INTERVENTIONS:  - Assess and monitor for signs and symptoms of infection  - Monitor lab/diagnostic results  - Monitor all insertion sites, i e  indwelling lines, tubes, and drains  - Monitor endotracheal if appropriate and nasal secretions for changes in amount and color  - [place] appropriate cooling/warming therapies per order  - Administer medications as ordered  - Instruct and encourage patient and family to use good hand hygiene technique  - Identify and instruct in appropriate isolation precautions for identified infection/condition  Outcome: Progressing     Problem: SAFETY ADULT  Goal: Patient will remain free of falls  Description: INTERVENTIONS:  - Educate patient/family on patient safety including physical limitations  - Instruct patient to call for assistance with activity   - Consult OT/PT to assist with strengthening/mobility   - Keep Call bell within reach  - Keep bed low and locked with side rails adjusted as appropriate  - Keep care items and personal belongings within reach  - Initiate and maintain comfort rounds  - Make Fall Risk Sign visible to staff  - Offer Toileting every 2 Hours, in advance of need  - Initiate/Maintain alarm  - Obtain necessary fall risk management equipment:   - Apply yellow socks and bracelet for high fall risk patients  - Consider moving patient to room near nurses station  Outcome: Progressing  Goal: Maintain or return to baseline ADL function  Description: INTERVENTIONS:  -  Assess patient's ability to carry out ADLs; assess patient's baseline for ADL function and identify physical deficits which impact ability to perform ADLs (bathing, care of mouth/teeth, toileting, grooming, dressing, etc )  - Assess/evaluate cause of self-care deficits   - Assess range of motion  - Assess patient's mobility; develop plan if impaired  - Assess patient's need for assistive devices and provide as appropriate  - Encourage maximum independence but intervene and supervise when necessary  - Involve family in performance of ADLs  - Assess for home care needs following discharge   - Consider OT consult to assist with ADL evaluation and planning for discharge  - Provide patient education as appropriate  Outcome: Progressing  Goal: Maintains/Returns to pre admission functional level  Description: INTERVENTIONS:  - Perform BMAT or MOVE assessment daily    - Set and communicate daily mobility goal to care team and patient/family/caregiver     - Collaborate with rehabilitation services on mobility goals if consulted  - Out of bed for toileting  - Record patient progress and toleration of activity level   Outcome: Progressing     Problem: DISCHARGE PLANNING  Goal: Discharge to home or other facility with appropriate resources  Description: INTERVENTIONS:  - Identify barriers to discharge w/patient and caregiver  - Arrange for needed discharge resources and transportation as appropriate  - Identify discharge learning needs (meds, wound care, etc )  - Arrange for interpretive services to assist at discharge as needed  - Refer to Case Management Department for coordinating discharge planning if the patient needs post-hospital services based on physician/advanced practitioner order or complex needs related to functional status, cognitive ability, or social support system  Outcome: Progressing     Problem: Knowledge Deficit  Goal: Patient/family/caregiver demonstrates understanding of disease process, treatment plan, medications, and discharge instructions  Description: Complete learning assessment and assess knowledge base  Interventions:  - Provide teaching at level of understanding  - Provide teaching via preferred learning methods  Outcome: Progressing     Problem: MOBILITY - ADULT  Goal: Maintain or return to baseline ADL function  Description: INTERVENTIONS:  -  Assess patient's ability to carry out ADLs; assess patient's baseline for ADL function and identify physical deficits which impact ability to perform ADLs (bathing, care of mouth/teeth, toileting, grooming, dressing, etc )  - Assess/evaluate cause of self-care deficits   - Assess range of motion  - Assess patient's mobility; develop plan if impaired  - Assess patient's need for assistive devices and provide as appropriate  - Encourage maximum independence but intervene and supervise when necessary  - Involve family in performance of ADLs  - Assess for home care needs following discharge   - Consider OT consult to assist with ADL evaluation and planning for discharge  - Provide patient education as appropriate  Outcome: Progressing  Goal: Maintains/Returns to pre admission functional level  Description: INTERVENTIONS:  - Perform BMAT or MOVE assessment daily    - Set and communicate daily mobility goal to care team and patient/family/caregiver     - Collaborate with rehabilitation services on mobility goals if consulted  - Out of bed for toileting  - Record patient progress and toleration of activity level   Outcome: Progressing     Problem: Prexisting or High Potential for Compromised Skin Integrity  Goal: Skin integrity is maintained or improved  Description: INTERVENTIONS:  - Identify patients at risk for skin breakdown  - Assess and monitor skin integrity  - Assess and monitor nutrition and hydration status  - Monitor labs   - Assess for incontinence   - Turn and reposition patient  - Assist with mobility/ambulation  - Relieve pressure over bony prominences  - Avoid friction and shearing  - Provide appropriate hygiene as needed including keeping skin clean and dry  - Evaluate need for skin moisturizer/barrier cream  - Collaborate with interdisciplinary team   - Patient/family teaching  - Consider wound care consult   Outcome: Progressing

## 2022-05-12 NOTE — ASSESSMENT & PLAN NOTE
· Creatinine on admission: 5 04  · Direct admit due to rising creatinine outpatient  · Bilateral lower extremity 2+ pitting edema  · Prior admission: admitted on 04/22,  Discharged on 04/29 with creatinine 3 85  · Believed to be due to volume overload, postinfectious GN, Staph aureus bacteremia  · Renal biopsy was not performed  · Baseline prior to this event was 0 6-0 9  · Fluid restriction, monitor I/O  · Avoid nephrotoxins, hypotension, IV contrast  · Consult nephrology - await recommendations  No clear indications for emergency HD at this time  · Started on diuresis with good output  Cr about stable at 5

## 2022-05-12 NOTE — TELEPHONE ENCOUNTER
5/12/22  CALLED AND SPOKE TO WIFE CAMILA  MYCHAL JAY FOR PT 5/13/22  PT CURRENTLY IN [facility]

## 2022-05-13 LAB
ALBUMIN SERPL BCP-MCNC: 2.1 G/DL (ref 3.5–5)
ALP SERPL-CCNC: 91 U/L (ref 46–116)
ALT SERPL W P-5'-P-CCNC: 11 U/L (ref 12–78)
ANION GAP SERPL CALCULATED.3IONS-SCNC: 10 MMOL/L (ref 4–13)
AST SERPL W P-5'-P-CCNC: 10 U/L (ref 5–45)
BASOPHILS # BLD AUTO: 0.06 THOUSANDS/ΜL (ref 0–0.1)
BASOPHILS NFR BLD AUTO: 1 % (ref 0–1)
BILIRUB SERPL-MCNC: 0.6 MG/DL (ref 0.2–1)
BUN SERPL-MCNC: 42 MG/DL (ref 5–25)
CALCIUM ALBUM COR SERPL-MCNC: 10.2 MG/DL (ref 8.3–10.1)
CALCIUM SERPL-MCNC: 8.7 MG/DL (ref 8.3–10.1)
CHLORIDE SERPL-SCNC: 103 MMOL/L (ref 100–108)
CO2 SERPL-SCNC: 27 MMOL/L (ref 21–32)
CREAT SERPL-MCNC: 5.5 MG/DL (ref 0.6–1.3)
EOSINOPHIL # BLD AUTO: 0.31 THOUSAND/ΜL (ref 0–0.61)
EOSINOPHIL NFR BLD AUTO: 5 % (ref 0–6)
ERYTHROCYTE [DISTWIDTH] IN BLOOD BY AUTOMATED COUNT: 14.5 % (ref 11.6–15.1)
GFR SERPL CREATININE-BSD FRML MDRD: 9 ML/MIN/1.73SQ M
GLUCOSE SERPL-MCNC: 88 MG/DL (ref 65–140)
HCT VFR BLD AUTO: 23.9 % (ref 36.5–49.3)
HGB BLD-MCNC: 7.7 G/DL (ref 12–17)
IMM GRANULOCYTES # BLD AUTO: 0.02 THOUSAND/UL (ref 0–0.2)
IMM GRANULOCYTES NFR BLD AUTO: 0 % (ref 0–2)
INR PPP: 1.68 (ref 0.84–1.19)
LYMPHOCYTES # BLD AUTO: 1.66 THOUSANDS/ΜL (ref 0.6–4.47)
LYMPHOCYTES NFR BLD AUTO: 24 % (ref 14–44)
MCH RBC QN AUTO: 29.5 PG (ref 26.8–34.3)
MCHC RBC AUTO-ENTMCNC: 32.2 G/DL (ref 31.4–37.4)
MCV RBC AUTO: 92 FL (ref 82–98)
MONOCYTES # BLD AUTO: 0.64 THOUSAND/ΜL (ref 0.17–1.22)
MONOCYTES NFR BLD AUTO: 9 % (ref 4–12)
NEUTROPHILS # BLD AUTO: 4.1 THOUSANDS/ΜL (ref 1.85–7.62)
NEUTS SEG NFR BLD AUTO: 61 % (ref 43–75)
NRBC BLD AUTO-RTO: 0 /100 WBCS
PLATELET # BLD AUTO: 206 THOUSANDS/UL (ref 149–390)
PMV BLD AUTO: 10.6 FL (ref 8.9–12.7)
POTASSIUM SERPL-SCNC: 3.3 MMOL/L (ref 3.5–5.3)
PROT SERPL-MCNC: 5.9 G/DL (ref 6.4–8.2)
PROTHROMBIN TIME: 19.5 SECONDS (ref 11.6–14.5)
RBC # BLD AUTO: 2.61 MILLION/UL (ref 3.88–5.62)
SODIUM SERPL-SCNC: 140 MMOL/L (ref 136–145)
WBC # BLD AUTO: 6.79 THOUSAND/UL (ref 4.31–10.16)

## 2022-05-13 PROCEDURE — 85025 COMPLETE CBC W/AUTO DIFF WBC: CPT | Performed by: HOSPITALIST

## 2022-05-13 PROCEDURE — 99232 SBSQ HOSP IP/OBS MODERATE 35: CPT | Performed by: INTERNAL MEDICINE

## 2022-05-13 PROCEDURE — 99232 SBSQ HOSP IP/OBS MODERATE 35: CPT | Performed by: HOSPITALIST

## 2022-05-13 PROCEDURE — 80053 COMPREHEN METABOLIC PANEL: CPT | Performed by: HOSPITALIST

## 2022-05-13 PROCEDURE — 85610 PROTHROMBIN TIME: CPT | Performed by: HOSPITALIST

## 2022-05-13 RX ORDER — FUROSEMIDE 10 MG/ML
60 INJECTION INTRAMUSCULAR; INTRAVENOUS ONCE
Status: COMPLETED | OUTPATIENT
Start: 2022-05-13 | End: 2022-05-13

## 2022-05-13 RX ORDER — ALBUMIN (HUMAN) 12.5 G/50ML
12.5 SOLUTION INTRAVENOUS ONCE
Status: COMPLETED | OUTPATIENT
Start: 2022-05-13 | End: 2022-05-13

## 2022-05-13 RX ORDER — WARFARIN SODIUM 5 MG/1
5 TABLET ORAL
Status: DISCONTINUED | OUTPATIENT
Start: 2022-05-13 | End: 2022-05-16

## 2022-05-13 RX ORDER — POTASSIUM CHLORIDE 20 MEQ/1
40 TABLET, EXTENDED RELEASE ORAL DAILY
Status: DISCONTINUED | OUTPATIENT
Start: 2022-05-13 | End: 2022-05-14

## 2022-05-13 RX ADMIN — METHOCARBAMOL TABLETS 750 MG: 500 TABLET, COATED ORAL at 04:55

## 2022-05-13 RX ADMIN — ACETAMINOPHEN 650 MG: 325 TABLET, FILM COATED ORAL at 21:19

## 2022-05-13 RX ADMIN — PANTOPRAZOLE SODIUM 40 MG: 40 TABLET, DELAYED RELEASE ORAL at 05:29

## 2022-05-13 RX ADMIN — GABAPENTIN 300 MG: 300 CAPSULE ORAL at 21:21

## 2022-05-13 RX ADMIN — GABAPENTIN 100 MG: 100 CAPSULE ORAL at 08:11

## 2022-05-13 RX ADMIN — DOXYCYCLINE 100 MG: 100 CAPSULE ORAL at 21:21

## 2022-05-13 RX ADMIN — AMLODIPINE BESYLATE 5 MG: 5 TABLET ORAL at 08:12

## 2022-05-13 RX ADMIN — DOXYCYCLINE 100 MG: 100 CAPSULE ORAL at 08:12

## 2022-05-13 RX ADMIN — PANTOPRAZOLE SODIUM 40 MG: 40 TABLET, DELAYED RELEASE ORAL at 17:04

## 2022-05-13 RX ADMIN — METOPROLOL SUCCINATE 100 MG: 50 TABLET, EXTENDED RELEASE ORAL at 08:12

## 2022-05-13 RX ADMIN — DOCUSATE SODIUM 100 MG: 100 CAPSULE, LIQUID FILLED ORAL at 08:12

## 2022-05-13 RX ADMIN — PRAVASTATIN SODIUM 40 MG: 40 TABLET ORAL at 17:05

## 2022-05-13 RX ADMIN — ALBUMIN (HUMAN) 12.5 G: 0.25 INJECTION, SOLUTION INTRAVENOUS at 13:49

## 2022-05-13 RX ADMIN — WARFARIN SODIUM 5 MG: 5 TABLET ORAL at 17:05

## 2022-05-13 RX ADMIN — FUROSEMIDE 60 MG: 10 INJECTION, SOLUTION INTRAMUSCULAR; INTRAVENOUS at 13:49

## 2022-05-13 RX ADMIN — POTASSIUM CHLORIDE 40 MEQ: 1500 TABLET, EXTENDED RELEASE ORAL at 08:11

## 2022-05-13 RX ADMIN — TAMSULOSIN HYDROCHLORIDE 0.4 MG: 0.4 CAPSULE ORAL at 17:04

## 2022-05-13 RX ADMIN — METHOCARBAMOL TABLETS 750 MG: 500 TABLET, COATED ORAL at 21:19

## 2022-05-13 RX ADMIN — DULOXETINE 60 MG: 30 CAPSULE, DELAYED RELEASE ORAL at 08:12

## 2022-05-13 NOTE — PROGRESS NOTES
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; seen by Nephrology at that time who recommended fluid restriction and albumin; creat on discharge was 3 8  · Creat on admit 5 0   · Evaluated by Nephrology and IV lasix 80 mg x 1 dose given 5/11 with good diuretic response; creat yesterday AM 5 1  ·  additional IV lasix 60 mg x 1 dose given yesterday with plan to start PO diuretics today per Nephrology  · Creat has elevated to 5 5 today; will await recommendations from Nephrology  · Worsening renal failure may be underlying cause to volume overload/LE edema in conjunction with hypoalbuminemia as opposed to acute CHF     Paroxysmal afib/aflutter  · Hx of aflutter ablation 2015  · Home meds: flecainide 100 mg BID, Toprol  mg QD  · EKG yesterday AM is sinus bradycardia with 1st degree AV block and RBBB rate of 56  · In setting of him being on flecainide and having JANEL, QRS duration was assessed; he has hx of RBBB and QRS in the past has been <200; AM of 5/11 his QRS is 218, we will hold flecainide at this time and can reconsider starting prior to d/c depending on his renal function  · Patient is not on tele but rhythm is regular by physical exam      Hypokalemia  · K+ 2 8 on admit; repleted by SLIM  · K+ this AM 3 3, continue to replete; goal 4 0 in setting of PAF     Diastolic HF  · Chronic B/L LE edema noted by Dr Bg Padilla in last office note; Dr Bg Padilla felt likely secondary to hypoalbuminemia (2 1 on admit); IV lasix trialed last admit with minimal response  · TTE from 4/2022: EF 55%; no WMA; grade 2DD; RV normal size and function; mild MR/TR  · Not on PO diuretic as outpatient  · See JANEL/renal failure above; Nephrology has been ordering IV diuretics     Essential HTN  · SBP averaging 160's  · Home meds: amlodipine 5 mg QD, Toprol  mg QD     HLD  · Pravastatin 40 mg QD     Factor V Leiden  · On Coumadin; INR on admit 1 6  · INR from this AM is 1 6     WILL  · Compliant with CPAP     Surgical site infection- s/p cervical fusion 8/50/6747 complicated by MRSA        Plan/Recommendations:  · Continue to hold flecainide; will review with Cardiology attending if we should restart prior to discharge vs  Chose another anti-arrhythmic  · Continue remainder of home meds  · IV/PO diuretics per Nephrology     _________________________________________________________________    Subjective  Patient notes some mild back pain but denies any shortness of breath at rest   No orthopnea  Patient notes continued lower extremity edema but states it is at his baseline  Review of Systems   Constitutional: Negative for chills, fever and malaise/fatigue  HENT: Negative for congestion  Cardiovascular: Negative for chest pain, dyspnea on exertion, leg swelling, orthopnea and palpitations  Respiratory: Negative for cough, shortness of breath (no SOB at rest) and wheezing  Endocrine: Negative  Hematologic/Lymphatic: Negative  Skin: Negative  Musculoskeletal: Positive for back pain  Gastrointestinal: Negative for bloating, abdominal pain, nausea and vomiting  Genitourinary: Negative  Neurological: Negative for dizziness and light-headedness  Psychiatric/Behavioral: Negative  All other systems reviewed and are negative  Objective:   Vitals: Blood pressure (!) 186/77, pulse 56, temperature 97 8 °F (36 6 °C), resp  rate 18, weight 107 kg (235 lb), SpO2 94 %  ,     Wt Readings from Last 3 Encounters:   05/13/22 107 kg (235 lb)   05/03/22 114 kg (251 lb)   04/29/22 113 kg (250 lb 1 6 oz)        Lab Results   Component Value Date    CREATININE 5 50 (H) 05/13/2022    CREATININE 5 11 (H) 05/12/2022    CREATININE 4 99 (H) 05/11/2022         Body mass index is 32 78 kg/m²  ,     Systolic (87SNH), RRD:730 , Min:157 , Max:186 Diastolic (74IXA), DFJ:63, Min:77, Max:80          Intake/Output Summary (Last 24 hours) at 5/13/2022 1111  Last data filed at 5/13/2022 0901  Gross per 24 hour   Intake 1210 ml   Output 2345 ml   Net -1135 ml     Weight (last 2 days)     Date/Time Weight    05/13/22 0823 107 (235)    05/13/22 0557 111 (244 27)    05/12/22 0600 109 (239 7)            Telemetry Review: Not on tele        Physical Exam  Vitals reviewed  Constitutional:       General: He is not in acute distress  HENT:      Head: Normocephalic and atraumatic  Mouth/Throat:      Mouth: Mucous membranes are moist    Cardiovascular:      Rate and Rhythm: Normal rate and regular rhythm  Heart sounds: S1 normal and S2 normal  Murmur heard  Pulmonary:      Effort: Pulmonary effort is normal  No respiratory distress  Breath sounds: Normal breath sounds  Abdominal:      General: Bowel sounds are normal  There is no distension  Palpations: Abdomen is soft  Musculoskeletal:         General: Normal range of motion  Cervical back: Normal range of motion and neck supple  Right lower leg: Edema present  Left lower leg: Edema present  Skin:     General: Skin is warm and dry  Neurological:      Mental Status: He is alert and oriented to person, place, and time     Psychiatric:         Mood and Affect: Mood normal          LABORATORY RESULTS      CBC with diff:   Results from last 7 days   Lab Units 05/13/22 0451 05/12/22 0349 05/11/22  0334 05/10/22  1949   WBC Thousand/uL 6 79 6 34 5 31 6 08   HEMOGLOBIN g/dL 7 7* 7 6* 7 1* 7 9*   HEMATOCRIT % 23 9* 23 8* 22 3* 24 8*   MCV fL 92 92 91 91   PLATELETS Thousands/uL 206 197 192 237   MCH pg 29 5 29 2 29 0 28 9   MCHC g/dL 32 2 31 9 31 8 31 9   RDW % 14 5 14 4 14 4 14 2   MPV fL 10 6 10 8 10 1 10 2   NRBC AUTO /100 WBCs 0 0 0 0       CMP:  Results from last 7 days   Lab Units 05/13/22 0451 05/12/22 0349 05/11/22  1338 05/11/22  0334 05/10/22  1950   POTASSIUM mmol/L 3  3* 3 4* 3 7 3 2* 2 8*   CHLORIDE mmol/L 103 104 103 102 100   CO2 mmol/L 27 28 27 26 27   BUN mg/dL 42* 39* 36* 35* 35*   CREATININE mg/dL 5 50* 5 11* 4 99* 5 00* 5 04*   CALCIUM mg/dL 8 7 8 1* 7 9* 8 1* 7 9*   AST U/L 10 14  --   --  14   ALT U/L 11* 9*  --   --  10*   ALK PHOS U/L 91 81  --   --  94   EGFR ml/min/1 73sq m 9 10 10 10 10       BMP:  Results from last 7 days   Lab Units 22  0451 22  0349 22  1338 22  0334 05/10/22  1950   POTASSIUM mmol/L 3 3* 3 4* 3 7 3 2* 2 8*   CHLORIDE mmol/L 103 104 103 102 100   CO2 mmol/L 27 28 27 26 27   BUN mg/dL 42* 39* 36* 35* 35*   CREATININE mg/dL 5 50* 5 11* 4 99* 5 00* 5 04*   CALCIUM mg/dL 8 7 8 1* 7 9* 8 1* 7 9*       Lab Results   Component Value Date    NTBNP 3,948 (H) 2022    NTBNP 121 2022             Results from last 7 days   Lab Units 05/10/22  1950   MAGNESIUM mg/dL 1 4*                     Results from last 7 days   Lab Units 22  0451 22  0334 05/10/22  1951 22  0000   INR  1 68* 3 18* 3 15* 4 00*       Lipid Profile:   Lab Results   Component Value Date    CHOL 151 10/25/2017    CHOL 169 2016    CHOL 176 09/10/2015     Lab Results   Component Value Date    HDL 50 2022    HDL 47 2021    HDL 65 2020     Lab Results   Component Value Date    LDLCALC 123 (H) 2022    LDLCALC 107 (H) 2021    LDLCALC 128 (H) 2020     Lab Results   Component Value Date    TRIG 164 (H) 2022    TRIG 181 (H) 2021    TRIG 116 2020       Cardiac testing:   Results for orders placed during the hospital encounter of 18    Echo complete with contrast if indicated    Narrative  [address] Burgos  [facility]  [facility], [address] [facility]  [phone]    Transthoracic Echocardiogram  2D, M-mode, Doppler, and Color Doppler    Study date:  2018    Patient: Saravanan Nath  MR number: [id number]  Account number: [de-identified]  : [id number]  Age: 80 years  Gender: Male  Status: Outpatient  Location: [address] [place] [address]  Height: 72 in  Weight: 277 4 lb  BP: 116/ 78 mmHg    Indications: Atrial fibrillation  Diagnoses: I48 0 - Atrial fibrillation    Sonographer:  KAY Marquez RD RVT  Primary Physician:  Gabe Garza MD  Referring Physician:  Luzma Kitchen MD  Group:  [facility]  Interpreting Physician:  Dinesh Rothman MD    SUMMARY    LEFT VENTRICLE:  Systolic function was mildly reduced by visual assessment  Ejection fraction was estimated to be 45 %  There was mild diffuse hypokinesis  Wall thickness was mildly increased  LEFT ATRIUM:  The atrium was mildly dilated  RIGHT ATRIUM:  The atrium was mildly dilated  MITRAL VALVE:  There was mild regurgitation  TRICUSPID VALVE:  There was mild regurgitation  HISTORY: PRIOR HISTORY: Hyperlipidemia, Coumadin, Sleep apnea  PRIOR PROCEDURES: Arrhythmia ablation  PROCEDURE: The study was performed in the [address] [place] [address]  This was a routine study  The transthoracic approach was used  The study included complete 2D imaging, M-mode, complete spectral Doppler, and color Doppler  Images were obtained from the parasternal, apical, subcostal, and suprasternal notch acoustic windows  Echocardiographic views were limited due to decreased penetration and lung interference  This was a technically difficult study  LEFT VENTRICLE: Size was normal  Systolic function was mildly reduced by visual assessment  Ejection fraction was estimated to be 45 %  There was mild diffuse hypokinesis  Wall thickness was mildly increased  DOPPLER: Transmitral flow  pattern: atrial fibrillation  RIGHT VENTRICLE: The size was normal  Systolic function was normal  DOPPLER: Systolic pressure was not estimated  LEFT ATRIUM: The atrium was mildly dilated  RIGHT ATRIUM: The atrium was mildly dilated      MITRAL VALVE: Valve structure was normal  There was normal leaflet separation  DOPPLER: The transmitral velocity was within the normal range  There was no evidence for stenosis  There was mild regurgitation  AORTIC VALVE: The valve was trileaflet  Leaflets exhibited normal thickness, mild calcification, and normal cuspal separation  DOPPLER: Transaortic velocity was within the normal range  There was no evidence for stenosis  There was no  regurgitation  TRICUSPID VALVE: The valve structure was normal  There was normal leaflet separation  DOPPLER: The transtricuspid velocity was within the normal range  There was no evidence for stenosis  There was mild regurgitation  PULMONIC VALVE: Leaflets exhibited normal thickness, no calcification, and normal cuspal separation  DOPPLER: The transpulmonic velocity was within the normal range  There was no regurgitation  PERICARDIUM: There was no pericardial effusion  AORTA: The root exhibited normal size  SYSTEMIC VEINS: IVC: The inferior vena cava was normal in size and course  Respirophasic changes were normal     SYSTEM MEASUREMENT TABLES    2D  %FS: 32 47 %  Ao Diam: 3 04 cm  EDV(Teich): 131 46 ml  EF(Teich): 60 39 %  ESV(Cube): 44 15 ml  ESV(Teich): 52 08 ml  IVSd: 1 17 cm  LA Area: 25 58 cm2  LA Diam: 5 06 cm  LVEDV MOD A4C: 85 53 ml  LVEF MOD A4C: 57 2 %  LVESV MOD A4C: 36 61 ml  LVIDd: 5 23 cm  LVIDs: 3 53 cm  LVLd A4C: 7 43 cm  LVLs A4C: 6 23 cm  LVPWd: 1 17 cm  RA Area: 20 53 cm2  RV Diam : 4 cm  SI(Cube): 40 49 ml/m2  SI(Teich): 32 4 ml/m2  SV MOD A4C: 48 92 ml  SV(Cube): 99 21 ml  SV(Teich): 79 38 ml    MM  TAPSE: 2 06 cm    PW  E': 0 08 m/s    [employer] Accredited Echocardiography Laboratory    Prepared and electronically signed by    Jo Epstein MD  Signed 03-Apr-2018 12:00:40    No results found for this or any previous visit  No results found for this or any previous visit  No valid procedures specified    Results for orders placed during the hospital encounter of 18    NM myocardial perfusion spect (stress and/or rest)    Narrative  [address]  [facility], [address] [place] [address]    Stress Gated SPECT Myocardial Perfusion Imaging after Exercise    Patient: KAMERON Barnes  MR number: [id number]  Account number: [de-identified]  : 1950  Age: 80 years  Gender: Male  Status: Outpatient  Location: [address] [place] [address]  Height: 72 in  Weight: 258 lb  BP: 116/ 93 mmHg    Allergies: MORPHINE    Diagnosis: I48 1 - Atrial flutter    Primary Physician:  Allie Ramírez MD  RN:  Anisha Salcedo  Referring Physician:  Luc Jason MD  Technician:  Babak Flies:  [facility]  Interpreting Physician:  Luc Jason MD    HISTORY: Factor V Leiden, Ablation, WILL, Cardioversion  The patient is a 80year old  male  Chest pain status: no chest pain  Coronary artery disease risk factors: dyslipidemia and family history of premature coronary artery  disease  Cardiovascular history: arrhythmia  Co-morbidity: obesity  Previous test results: abnormal ECG  PHYSICAL EXAM: Baseline physical exam screening: no wheezes audible  REST ECG: Atrial fibrillation  PROCEDURE: The study was performed in the the [address] [place] [address]  Treadmill exercise testing was performed, using the Nirmal protocol  Gated SPECT myocardial perfusion imaging was performed during stress  Systolic blood  pressure was 116 mmHg, at the start of the study  Diastolic blood pressure was 93 mmHg, at the start of the study  The heart rate was 87 bpm, at the start of the study  IV double checked      NIRMAL PROTOCOL:  HR bpm SBP mmHg DBP mmHg Symptoms  Baseline 87 116 93 none  Stage 1 120 100 71 leg pain  Stage 2 144 144 72 fatigue, leg pain  Recovery 1 137 174 93 subsiding  Recovery 2 136 176 99 subsiding  Recovery 3 136 148 97 none  Recovery 5 115 137 99 none  No medications or fluids given     STRESS SUMMARY: 02 sat 97% baseline, 97% peak  Duration of exercise was 6 min and 0 sec  The patient exercised to protocol stage 2  Maximal work rate was 7 METs  Functional capacity was slightly decreased (20% to 30%)  Maximal heart rate  during stress was 164 bpm ( 107 % of maximal predicted heart rate)  The heart rate response to stress was normal  There was normal resting blood pressure with an appropriate response to stress  The rate-pressure product for the peak heart  rate and blood pressure was 24289  There was no chest pain during stress  The stress test was terminated due to leg pain and fatigue  The stress ECG was negative for ischemia and normal  Arrhythmia during stress: Persistence of atrial  fibrillation throughout the study  ISOTOPE ADMINISTRATION:  Resting isotope administration Stress isotope administration  Agent Tetrofosmin Tetrofosmin  Dose 15 8 mCi 48 2 mCi  Date 09/21/2018 09/21/2018  Injection-image interval 44 min 41 min    The radiopharmaceutical was injected one minute before the end of exercise  MYOCARDIAL PERFUSION IMAGING:  The image quality was good  Rotating projection images reveal mild diaphragmatic attenuation  Left ventricular size was normal  The TID ratio was 0 88  PERFUSION DEFECTS:  -  There were no significant perfusion defects  PERFUSION QUANTITATION:  The summed perfusion score measured 0 during stress  GATED SPECT:  The calculated left ventricular ejection fraction was 61 %  Left ventricular ejection fraction was within normal limits by visual estimate  There was no left ventricular regional abnormality  SUMMARY:  -  Stress results: Duration of exercise was 6 min and 0 sec  Maximal work rate was 7 METs  Functional capacity was slightly decreased (20% to 30%)  Target heart rate was achieved  There was no chest pain during stress  -  ECG conclusions:  The stress ECG was negative for ischemia and normal  Arrhythmia during stress: Persistence of atrial fibrillation throughout the study  -  Perfusion imaging: There were no significant perfusion defects   -  Gated SPECT: The calculated left ventricular ejection fraction was 61 %  Left ventricular ejection fraction was within normal limits by visual estimate  There was no left ventricular regional abnormality  IMPRESSIONS: Normal study after maximal exercise  Myocardial perfusion imaging was normal at rest and with stress   Left ventricular systolic function was normal     Prepared and signed by    Mary Vidal MD  Signed 09/21/2018 13:46:16        Meds/Allergies   current meds:   Current Facility-Administered Medications   Medication Dose Route Frequency    acetaminophen (TYLENOL) tablet 650 mg  650 mg Oral Q6H PRN    amLODIPine (NORVASC) tablet 5 mg  5 mg Oral Daily    docusate sodium (COLACE) capsule 100 mg  100 mg Oral Daily    doxycycline hyclate (VIBRAMYCIN) capsule 100 mg  100 mg Oral Q12H [address]    DULoxetine (CYMBALTA) delayed release capsule 60 mg  60 mg Oral Daily    gabapentin (NEURONTIN) capsule 100 mg  100 mg Oral Daily    gabapentin (NEURONTIN) capsule 300 mg  300 mg Oral HS    lidocaine (LIDODERM) 5 % patch 1 patch  1 patch Topical HS    methocarbamol (ROBAXIN) tablet 750 mg  750 mg Oral Q6H PRN    metoprolol succinate (TOPROL-XL) 24 hr tablet 100 mg  100 mg Oral Daily    ondansetron (ZOFRAN) injection 4 mg  4 mg Intravenous Q4H PRN    pantoprazole (PROTONIX) EC tablet 40 mg  40 mg Oral BID AC    potassium chloride (K-DUR,KLOR-CON) CR tablet 40 mEq  40 mEq Oral Daily    pravastatin (PRAVACHOL) tablet 40 mg  40 mg Oral Daily With Dinner    tamsulosin (FLOMAX) capsule 0 4 mg  0 4 mg Oral Daily With Dinner     Medications Prior to Admission   Medication    acetaminophen (TYLENOL) 325 mg tablet    amLODIPine (NORVASC) 5 mg tablet    docusate sodium (COLACE) 100 mg capsule    doxycycline (ADOXA) 100 MG tablet    DULoxetine (CYMBALTA) 60 mg delayed release capsule    flecainide (TAMBOCOR) 100 mg tablet    gabapentin (NEURONTIN) 100 mg capsule    gabapentin (NEURONTIN) 300 mg capsule    methocarbamol (ROBAXIN) 750 mg tablet    metoprolol succinate (TOPROL-XL) 100 mg 24 hr tablet    ondansetron (ZOFRAN) 4 mg tablet    pantoprazole (PROTONIX) 40 mg tablet    pravastatin (PRAVACHOL) 40 mg tablet    tamsulosin (FLOMAX) 0 4 mg    warfarin (COUMADIN) 5 mg tablet    polyethylene glycol (MIRALAX) 17 g packet            Counseling / Coordination of Care  Total floor / unit time spent today 20 minutes  Greater than 50% of total time was spent with the patient and / or family counseling and / or coordination of care  ** Please Note: Dragon 360 Dictation voice to text software may have been used in the creation of this document   **

## 2022-05-13 NOTE — PROGRESS NOTES
Miryam Vazquez  71 y o   male  1950  mrn [id number]    Assessment/Plan: 1  Post-op wound infection/osteomyelitis cervical spine/arf     I do not feel that the infection or the doxcycyline is contributing to his worsening renal function at this time  I think it is safe to follow Dr Dontrell Campbell recommendation that we continue the doxycyline for 2 more weeks  D/c 5/24  No need for further imaging at this point in time  Last ESR was 110, but was down in the 50's, CRP went from 30 to 8, so not too concerned         - continue Doxycyline 100 mg bid until 5/24  - monitor for signs of worsening infection  - renal failure per renal    ID to sign off, call with ?'s, changes in clinical status  Patient to f/u with Dr Morgan Bones after discharge    Subjective: No fevers, being diuresed by nephrology  Objective:     T 98 2    Gen: nad   Cor: rrr  Lungs: decreased  Abd: soft  Neck: c-collar      Labs:  CBC w/diff  Recent Labs     05/13/22 0451   WBC 6 79   HGB 7 7*   HCT 23 9*      NEUTOPHILPCT 61   LYMPHOPCT 24   MONOPCT 9   EOSPCT 5     BMP  Recent Labs     05/13/22  0451   K 3 3*      CO2 27   BUN 42*   CREATININE 5 50*   CALCIUM 8 7     CMP  Recent Labs     05/13/22  0451   K 3 3*      CO2 27   BUN 42*   CREATININE 5 50*   CALCIUM 8 7   ALKPHOS 91   ALT 11*   AST 10        labrc    Cultures:  Lab Results   Component Value Date    BLOODCX No Growth at 48 hrs  05/10/2022    BLOODCX No Growth at 48 hrs  05/10/2022    BLOODCX No Growth After 5 Days  03/31/2022    BLOODCX No Growth After 5 Days   03/31/2022     Lab Results   Component Value Date    WOUNDCULT (A) 03/31/2022     2+ Growth of Methicillin Resistant Staphylococcus aureus     Lab Results   Component Value Date    URINECX No Growth <1000 cfu/mL 05/10/2022     No results found for: SPUTUMCULTUR    MED: reviewed       Current Facility-Administered Medications:     acetaminophen (TYLENOL) tablet 650 mg, 650 mg, Oral, Q6H PRN, Jonelle Lou PA-C, 650 mg at 05/12/22 2145    amLODIPine (NORVASC) tablet 5 mg, 5 mg, Oral, Daily, Jonelle Lou PA-C, 5 mg at 05/13/22 3626    docusate sodium (COLACE) capsule 100 mg, 100 mg, Oral, Daily, Jonelle Lou PA-C, 100 mg at 05/13/22 0563    doxycycline hyclate (VIBRAMYCIN) capsule 100 mg, 100 mg, Oral, Q12H [address], Jonelle Lou PA-C, 100 mg at 05/13/22 2973    DULoxetine (CYMBALTA) delayed release capsule 60 mg, 60 mg, Oral, Daily, Jonelle Lou PA-C, 60 mg at 05/13/22 4619    gabapentin (NEURONTIN) capsule 100 mg, 100 mg, Oral, Daily, Jonelle Lou PA-C, 100 mg at 05/13/22 7145    gabapentin (NEURONTIN) capsule 300 mg, 300 mg, Oral, HS, Jonelle Lou PA-C, 300 mg at 05/12/22 2142    lidocaine (LIDODERM) 5 % patch 1 patch, 1 patch, Topical, HS, Annette Flores PA-C, 1 patch at 05/12/22 2143    methocarbamol (ROBAXIN) tablet 750 mg, 750 mg, Oral, Q6H PRN, Reanna Strong PA-C, 750 mg at 05/13/22 0455    metoprolol succinate (TOPROL-XL) 24 hr tablet 100 mg, 100 mg, Oral, Daily, Jonelle Lou PA-C, 100 mg at 05/13/22 3721    ondansetron (ZOFRAN) injection 4 mg, 4 mg, Intravenous, Q4H PRN, Annette Flores PA-C, 4 mg at 05/12/22 2145    pantoprazole (PROTONIX) EC tablet 40 mg, 40 mg, Oral, BID AC, Jonelle Lou PA-C, 40 mg at 05/13/22 0529    potassium chloride (K-DUR,KLOR-CON) CR tablet 40 mEq, 40 mEq, Oral, Daily, Beatriz June MD, 40 mEq at 05/13/22 4602    pravastatin (PRAVACHOL) tablet 40 mg, 40 mg, Oral, Daily With Dinner, Reanna Strong PA-C, 40 mg at 05/12/22 1719    tamsulosin (FLOMAX) capsule 0 4 mg, 0 4 mg, Oral, Daily With Dinner, Reanna Strong PA-C, 0 4 mg at 05/12/22 1719    Principal Problem:    JANEL (acute kidney injury) ([facility] [address] )  Active Problems:    Essential hypertension    WILL (obstructive sleep apnea)    Factor V Leiden mutation (HCC)    Depression, major, single episode, moderate (HCC)    Paroxysmal A-fib (HCC)    Mixed hyperlipidemia    Weakness    Anemia    Surgical site infection    Glomerulonephritis    Chronic diastolic (congestive) heart failure (HCC)    Hypokalemia    Hypomagnesemia      Melony Womack MD

## 2022-05-13 NOTE — PROGRESS NOTES
[place]  Progress Note - Ramiro Hanley 1950, 71 y o  male MRN: [id number]  Unit/Bed#: -Yaya Encounter: [id number]  Primary Care Provider: Cielo Ramirez MD   Date and time admitted to hospital: 5/10/2022  6:51 PM    Hypomagnesemia  Assessment & Plan  · Magnesium 1 4 on admission  · Ordered 1 g IV magnesium   · Continue to monitor and replete as needed    Hypokalemia  Assessment & Plan  · Potassium 2 8 on admission  · Ordered 40 mEq p o  potassium and 20 mEq IV potassium   · On telemetry   · Continue to monitor and replete as needed    Chronic diastolic (congestive) heart failure (HCC)  Assessment & Plan  Wt Readings from Last 3 Encounters:   05/13/22 107 kg (235 lb)   05/03/22 114 kg (251 lb)   04/29/22 113 kg (250 lb 1 6 oz)     · Prior echo 04/25/2022:  EF 74%, Diastolic function: grade 2 pseudonormal relaxation  · Monitor I/O and daily weights  · Does not take outpatient diuretics  IV Lasix with minimal results during prior admission  Patient had received albumin to help with anasarca  · Fluid restriction, 2 g sodium    Glomerulonephritis  Assessment & Plan  · Diagnosed during prior admission  · Biopsy was not performed during prior admit  · Postinfectious suspected  · Treatment per nephrology     Surgical site infection  Assessment & Plan  · S/p cervical fusion performed by Dr Soumya Hensley on 5/21/80, complicated by MRSA infection  · Completed 24 days of IV vancomycin, transitioned to daptomycin on 04/24-4/29  Discharged home on doxycycline 100 mg b i d    · Continue doxycycline  · apprec ID, not felt to be contributing to PIGN     Anemia  Assessment & Plan  · Hemoglobin on admission:  7 9  · Patient with hematuria    Believed to be due to GN  · During prior admission, new normocytic anemia, hemoglobin had been 6 6, received 1 unit of PRBCs on 04/22  · FOBT was negative  · Transfuse if hemoglobin less than 7    Weakness  Assessment & Plan  · Reports ongoing weakness and fatigue  Per wife patient spends majority of the day napping  · Denies recent falls  · Suspect ongoing weakness/fatigue due to ongoing infection, JANEL, GN and deconditioning from frequent hospital admissions  · Consult PT/OT    Mixed hyperlipidemia  Assessment & Plan  · Continue statin    Paroxysmal A-fib (HCC)  Assessment & Plan  · Home regimen: metoprolol succinate 100 mg daily and flecainide 100 mg b i d   · Continue metoprolol  flecanide on hold by cards d/t variable renal function  · Coumadin 2 5 mg 5x weekly, 5 mg 2x weekly  · INR 4 0 on 05/09  Outpatient provider had instructed patient to hold dose on 5/9 and 5/10  · INR on admission on 05/10 was 3 15  Resume coumadin as above  · INR goal 2-3     Depression, major, single episode, moderate (HCC)  Assessment & Plan  · Continue Cymbalta    Factor V Leiden mutation ([facility] )  Assessment & Plan  · Prescribed Coumadin  · INR 4 0 on 05/09  Outpatient provider had instructed patient to hold dose on 5/9 and 5/10  · INR on admission on 05/10 was 3 15  Resume coumadin today as now subtherapeutic  · INR goal 2-3    IWLL (obstructive sleep apnea)  Assessment & Plan  · CPAP HS - patient brought home machine in    Essential hypertension  Assessment & Plan  · Home regimen:  Amlodipine 5 mg daily and metoprolol succinate 100 mg daily    * JANEL (acute kidney injury) ([facility] )  Assessment & Plan  · Creatinine on admission: 5 04  · Direct admit due to rising creatinine outpatient  · Bilateral lower extremity 2+ pitting edema  · Prior admission: admitted on 04/22,  Discharged on 04/29 with creatinine 3 85  · Believed to be due to volume overload, postinfectious GN, Staph aureus bacteremia  · Renal biopsy was not performed  Patient and family are not interested in bx as felt to be invasive per their discussion with me    · Baseline prior to this event was 0 6-0 9  · Fluid restriction, monitor I/O  · Avoid nephrotoxins, hypotension, IV contrast  · Consult nephrology - await recommendations  No clear indications for emergency HD at this time  · Started on diuresis with good output  Cr stable at 5 but slightly trending up  VTE  Prophylaxis:   Pharmacologic: in place    Patient Centered Rounds: I have performed bedside rounds with nursing staff today  Discussions with Specialists or Other Care Team Provider: case management    Education and Discussions with Family / Patient: pt      Current Length of Stay: 3 day(s)    Current Patient Status: Inpatient        Code Status: Level 3 - DNAR and DNI      Subjective:   Pt reports no sob  Resting comfortably  No pain  Urinating ok    Patient is seen and examined at bedside  All other ROS are negative  Objective:     Vitals:   Temp (24hrs), Av 6 °F (36 4 °C), Min:97 5 °F (36 4 °C), Max:97 8 °F (36 6 °C)    Temp:  [97 5 °F (36 4 °C)-97 8 °F (36 6 °C)] 97 5 °F (36 4 °C)  HR:  [55-58] 58  Resp:  [18] 18  BP: (157-186)/(73-80) 162/73  SpO2:  [94 %-97 %] 95 %  Body mass index is 32 78 kg/m²  Input and Output Summary (last 24 hours): Intake/Output Summary (Last 24 hours) at 2022 1449  Last data filed at 2022 1445  Gross per 24 hour   Intake 1450 ml   Output 2525 ml   Net -1075 ml       Physical Exam:       GEN: No acute distress, comfortable in cervical collar    HEEENT: No JVD, PERRLA, no scleral icterus  RESP: Lungs clear to auscultation bilaterally  CV: RRR, +s1/s2   ABD: SOFT NON TENDER, POSITIVE BOWEL SOUNDS, NO DISTENTION  PSYCH: CALM  NEURO: A X O X 3, NO FOCAL DEFICITS  SKIN: NO RASH  EXTREM: ++ Edema    Additional Data:     Labs:    Results from last 7 days   Lab Units 22  0451   WBC Thousand/uL 6 79   HEMOGLOBIN g/dL 7 7*   HEMATOCRIT % 23 9*   PLATELETS Thousands/uL 206   NEUTROS PCT % 61   LYMPHS PCT % 24   MONOS PCT % 9   EOS PCT % 5     Results from last 7 days   Lab Units 22  0451   SODIUM mmol/L 140   POTASSIUM mmol/L 3 3*   CHLORIDE mmol/L 103   CO2 mmol/L 27   BUN mg/dL 42* CREATININE mg/dL 5 50*   ANION GAP mmol/L 10   CALCIUM mg/dL 8 7   ALBUMIN g/dL 2 1*   TOTAL BILIRUBIN mg/dL 0 60   ALK PHOS U/L 91   ALT U/L 11*   AST U/L 10   GLUCOSE RANDOM mg/dL 88     Results from last 7 days   Lab Units 05/13/22  0451   INR  1 68*             Results from last 7 days   Lab Units 05/10/22  1949   LACTIC ACID mmol/L 1 4           * I Have Reviewed All Lab Data Listed Above  Imaging:     Results for orders placed during the hospital encounter of 04/22/22    XR chest 1 view portable    Addendum 4/22/2022  8:39 PM  ADDENDUM:    PICC line tip projects over the region of the mid SVC  Narrative  CHEST    INDICATION:   weakness  COMPARISON:  None    EXAM PERFORMED/VIEWS:  XR CHEST PORTABLE      FINDINGS:    Cardiomediastinal silhouette appears unremarkable  The lungs are clear  No pneumothorax or pleural effusion  Osseous structures appear within normal limits for patient age  Impression  No acute cardiopulmonary disease  Workstation performed: [id number]    No results found for this or any previous visit  *I have reviewed all imaging reports listed above      Recent Cultures (last 7 days):     Results from last 7 days   Lab Units 05/10/22  2154 05/10/22  2022 05/10/22  1953   BLOOD CULTURE   --  No Growth at 48 hrs  No Growth at 48 hrs     URINE CULTURE  No Growth <1000 cfu/mL  --   --        Last 24 Hours Medication List:   Current Facility-Administered Medications   Medication Dose Route Frequency Provider Last Rate    acetaminophen  650 mg Oral Q6H PRN Yen Chiang PA-C      amLODIPine  5 mg Oral Daily Yen Chiang PA-C      docusate sodium  100 mg Oral Daily Yen Chiang PA-C      doxycycline hyclate  100 mg Oral Q12H [facility] Yen Chiang PA-C      DULoxetine  60 mg Oral Daily Yen Chiang PA-C      gabapentin  100 mg Oral Daily Yen Chiang PA-C      gabapentin  300 mg Oral HS Yen Chiang PA-C      lidocaine  1 patch Topical HS Andres Dotson PA-C      methocarbamol  750 mg Oral Q6H PRN Ruby Anaya PA-C      metoprolol succinate  100 mg Oral Daily Ruby Anaya PA-C      ondansetron  4 mg Intravenous Q4H PRN Annette Flores PA-C      pantoprazole  40 mg Oral BID AC Jonelle Lou PA-C      potassium chloride  40 mEq Oral Daily Tee Clifton MD      pravastatin  40 mg Oral Daily With Dinner Ruby Anaya PA-C      tamsulosin  0 4 mg Oral Daily With Dinner Ruby Anaya PA-C      warfarin  5 mg Oral Daily (warfarin) Tee Clifton MD          Today, Patient Was Seen By: Tee Clifton MD    ** Please Note: Dictation voice to text software may have been used in the creation of this document   **

## 2022-05-13 NOTE — ASSESSMENT & PLAN NOTE
Wt Readings from Last 3 Encounters:   05/13/22 107 kg (235 lb)   05/03/22 114 kg (251 lb)   04/29/22 113 kg (250 lb 1 6 oz)     · Prior echo 04/25/2022:  EF 33%, Diastolic function: grade 2 pseudonormal relaxation  · Monitor I/O and daily weights  · Does not take outpatient diuretics  IV Lasix with minimal results during prior admission    Patient had received albumin to help with anasarca  · Fluid restriction, 2 g sodium

## 2022-05-13 NOTE — PROGRESS NOTES
NEPHROLOGY PROGRESS NOTE   Miryam Vazquez 71 y o  male MRN: [id number]  Unit/Bed#: -01 Encounter: [id number]  Reason for Consult: JANEL      SUMMARY:    78-year-old male, with no prior history of chronic kidney disease, factor V Leiden deficiency, DVT, chronic diastolic congestive heart failure, who underwent cervical fusion on March 85UD which was complicated by MRSA surgical site infection and osteomyelitis was subsequently developed acute kidney injury in April   Patient was readmitted due to concerns of nausea and worsening renal function      ASSESSMENT and PLAN:     Acute kidney injury/worsening renal function  --baseline creatinine is less than 1 mg/dL  --developed acute kidney injury in April, which was suspected to possibly be related to acute tubular necrosis versus postinfectious glomerular nephritis  --his creatinine plateaued in the high threes upon discharge  --renal function now worsening creatinine at 5 mg/dL and currently raining stable at this time  --BUN is not significantly elevated  --renal ultrasound showed no evidence of hydronephrosis with no echogenicity    --serologies:  Hepatitis panel was nonreactive, complement C3 and C4 within normal limits, anti-double-stranded DNA was negative, ZAK was negative, anti-glomerular basement antibody was negative, ANCA titer were negative, serum protein electrophoresis/UPEP showed no monoclonal bands, hemolysis smear negative for schistocytes  --no symptoms of uremia at this time  --no urgent indication for renal placement therapy  --discussed about a renal biopsy   Given his low hemoglobin and currently being on anticoagulation and given his prior serologies being negative there is low suspicion that the biopsy would change the overall management and could considered be high risk procedure   After discussion with them they would like to hold off on this procedure which I agree with   --good urine output with good response to IV Lasix  --low hemoglobin Will slow renal recovery, transfuse for hemoglobin less than 7  --nephrotic range proteinuria with a urine protein creatinine ratio greater than 18, repeat was 16 5, though may not have been steady state  --monitor input and output  --avoid contrast studies, NSAIDs  --no indication for renal ultrasound at this time  --no indication to repeat serologies  --outpatient nephrologist Dr Logan  --creatinine has increased to 5 5 mg/dL in the setting of appropriate diuresis, still no indication for renal replacement therapy at this time  --60 mg IV Lasix x1 with 25% 25 g of IV albumin to provide some colloid volume  --I believe if we were to proceed with a renal biopsy in the future his blood pressure and volume status would be need to be better and his hemoglobin should be maintained at least greater than 8 5 before proceeding     Hypokalemia  --planning for diuresis  --started on potassium chloride 40 mEq  --can liberalize potassium in the diet     Microscopic hematuria  --suspected to be related to a post infectious glomerular nephritis  --was seen by Urology  --negative serologies  --he is reporting that his urine is becoming less dark and little bit more clear after the dose of IV Lasix     MRSA surgical site infection, OM:  --cervical fusion 3/11/2022 c/b MRSA surgical site infection, osteomyelitis  --s/p debridement and washout 4/1/2022   --previously on vancomycin--> daptomycin course completed 4/29/2022   Remains on oral doxycycline     Diastolic congestive heart failure  --preserved ejection fraction of 55% with grade 2 diastolic impaired relaxation  --volume status improving  --plan for another dose of IV Lasix today    Hypertension  --volume mediated  --plan for diuresis  --if potassium still low tomorrow, along with high blood pressure, could consider adding spironolactone     Factor V Leiden deficiency:   --hx of associated DVT  --on chronic coumadin for factor V Leiden and atrial fib     Anemia  --chronic  --recent iron study showed iron level 41, ferritin 521, iron saturation 20, TIBC 204  --hemoglobin low but stable  --low hemoglobin Will slow renal recovery  --hemolysis smear showed no schistocytes  --no evidence of thrombocytopenia, less likely TTP      SUBJECTIVE / INTERVAL HISTORY:    No complaints this morning    OBJECTIVE:  Current Weight: Weight - Scale: 107 kg (235 lb)  Vitals:    05/13/22 0557 05/13/22 0749 05/13/22 0823 05/13/22 0900   BP:  (!) 186/77     BP Location:       Pulse:  56     Resp:  18     Temp:  97 8 °F (36 6 °C)     TempSrc:       SpO2:  96%  94%   Weight: 111 kg (244 lb 4 3 oz)  107 kg (235 lb)        Intake/Output Summary (Last 24 hours) at 5/13/2022 1309  Last data filed at 5/13/2022 1232  Gross per 24 hour   Intake 1450 ml   Output 2545 ml   Net -1095 ml       Review of Systems:    12 point ROS has been reviewed  Physical Exam  Vitals and nursing note reviewed  Constitutional:       General: He is not in acute distress  Appearance: He is well-developed  HENT:      Head: Normocephalic and atraumatic  Eyes:      General: No scleral icterus  Conjunctiva/sclera: Conjunctivae normal       Pupils: Pupils are equal, round, and reactive to light  Cardiovascular:      Rate and Rhythm: Normal rate and regular rhythm  Heart sounds: S1 normal and S2 normal  No murmur heard  No friction rub  No gallop  Pulmonary:      Effort: Pulmonary effort is normal  No respiratory distress  Breath sounds: Normal breath sounds  No wheezing or rales  Abdominal:      General: Bowel sounds are normal       Palpations: Abdomen is soft  Tenderness: There is no abdominal tenderness  There is no rebound  Musculoskeletal:         General: Swelling present  Normal range of motion  Cervical back: Normal range of motion and neck supple  Right lower leg: Edema present  Left lower leg: Edema present  Skin:     General: Skin is dry  Coloration: Skin is pale  Findings: No rash  Neurological:      Mental Status: He is alert and oriented to person, place, and time     Psychiatric:         Behavior: Behavior normal          Medications:    Current Facility-Administered Medications:     acetaminophen (TYLENOL) tablet 650 mg, 650 mg, Oral, Q6H PRN, Ceci Nichole PA-C, 650 mg at 05/12/22 2145    albumin human (FLEXBUMIN) 25 % injection 12 5 g, 12 5 g, Intravenous, Once, Jaquelin Lopez MD    amLODIPine (NORVASC) tablet 5 mg, 5 mg, Oral, Daily, Jonelle Lou PA-C, 5 mg at 05/13/22 8901    docusate sodium (COLACE) capsule 100 mg, 100 mg, Oral, Daily, Jonelle Lou PA-C, 100 mg at 05/13/22 2165    doxycycline hyclate (VIBRAMYCIN) capsule 100 mg, 100 mg, Oral, Q12H [address], Jonelle Lou PA-C, 100 mg at 05/13/22 6843    DULoxetine (CYMBALTA) delayed release capsule 60 mg, 60 mg, Oral, Daily, Jonelle Lou PA-C, 60 mg at 05/13/22 0019    furosemide (LASIX) injection 60 mg, 60 mg, Intravenous, Once, Jaquelin Lopez MD    gabapentin (NEURONTIN) capsule 100 mg, 100 mg, Oral, Daily, Jonelle Lou PA-C, 100 mg at 05/13/22 0811    gabapentin (NEURONTIN) capsule 300 mg, 300 mg, Oral, HS, Jonelle Lou PA-C, 300 mg at 05/12/22 2142    lidocaine (LIDODERM) 5 % patch 1 patch, 1 patch, Topical, HS, Annette Flores PA-C, 1 patch at 05/12/22 2143    methocarbamol (ROBAXIN) tablet 750 mg, 750 mg, Oral, Q6H PRN, Ceci Nichole PA-C, 750 mg at 05/13/22 0455    metoprolol succinate (TOPROL-XL) 24 hr tablet 100 mg, 100 mg, Oral, Daily, Jonelle Lou PA-C, 100 mg at 05/13/22 5319    ondansetron (ZOFRAN) injection 4 mg, 4 mg, Intravenous, Q4H PRN, Annette Flores PA-C, 4 mg at 05/12/22 2145    pantoprazole (PROTONIX) EC tablet 40 mg, 40 mg, Oral, BID AC, Jonelle Lou PA-C, 40 mg at 05/13/22 0529    potassium chloride (K-DUR,KLOR-CON) CR tablet 40 mEq, 40 mEq, Oral, Daily, Cesar Ventura MD, 40 mEq at 05/13/22 0811    pravastatin (PRAVACHOL) tablet 40 mg, 40 mg, Oral, Daily With Neil Caballero PA-C, 40 mg at 05/12/22 1719    tamsulosin (FLOMAX) capsule 0 4 mg, 0 4 mg, Oral, Daily With Neil Caballero PA-C, 0 4 mg at 05/12/22 1719    Laboratory Results:  Results from last 7 days   Lab Units 05/13/22  0451 05/12/22  0349 05/11/22  1338 05/11/22  0334 05/10/22  1950 05/10/22  1949   WBC Thousand/uL 6 79 6 34  --  5 31  --  6 08   HEMOGLOBIN g/dL 7 7* 7 6*  --  7 1*  --  7 9*   HEMATOCRIT % 23 9* 23 8*  --  22 3*  --  24 8*   PLATELETS Thousands/uL 206 197  --  192  --  237   POTASSIUM mmol/L 3 3* 3 4* 3 7 3 2* 2 8*  --    CHLORIDE mmol/L 103 104 103 102 100  --    CO2 mmol/L 27 28 27 26 27  --    BUN mg/dL 42* 39* 36* 35* 35*  --    CREATININE mg/dL 5 50* 5 11* 4 99* 5 00* 5 04*  --    CALCIUM mg/dL 8 7 8 1* 7 9* 8 1* 7 9*  --    MAGNESIUM mg/dL  --   --   --   --  1 4*  --    PHOSPHORUS mg/dL  --   --   --   --  5 4*  --        PLEASE NOTE:  This encounter was completed utilizing the [employer] Direct Speech Voice Recognition Software  Grammatical errors, random word insertions, pronoun errors and incomplete sentences are occasional consequences of the system due to software limitations, ambient noise and hardware issues  These may be missed by proof reading prior to affixing electronic signature  Any questions or concerns about the content, text or information contained within the body of this dictation should be directly addressed to the physician for clarification  Please do not hesitate to call me directly if you have any any questions or concerns

## 2022-05-13 NOTE — PLAN OF CARE
Problem: PAIN - ADULT  Goal: Verbalizes/displays adequate comfort level or baseline comfort level  Description: Interventions:  - Encourage patient to monitor pain and request assistance  - Assess pain using appropriate pain scale  - Administer analgesics based on type and severity of pain and evaluate response  - Implement non-pharmacological measures as appropriate and evaluate response  - Consider cultural and social influences on pain and pain management  - Notify physician/advanced practitioner if interventions unsuccessful or patient reports new pain  Outcome: Progressing     Problem: INFECTION - ADULT  Goal: Absence or prevention of progression during hospitalization  Description: INTERVENTIONS:  - Assess and monitor for signs and symptoms of infection  - Monitor lab/diagnostic results  - Monitor all insertion sites, i e  indwelling lines, tubes, and drains  - Monitor endotracheal if appropriate and nasal secretions for changes in amount and color  - [place] appropriate cooling/warming therapies per order  - Administer medications as ordered  - Instruct and encourage patient and family to use good hand hygiene technique  - Identify and instruct in appropriate isolation precautions for identified infection/condition  Outcome: Progressing     Problem: SAFETY ADULT  Goal: Patient will remain free of falls  Description: INTERVENTIONS:  - Educate patient/family on patient safety including physical limitations  - Instruct patient to call for assistance with activity   - Consult OT/PT to assist with strengthening/mobility   - Keep Call bell within reach  - Keep bed low and locked with side rails adjusted as appropriate  - Keep care items and personal belongings within reach  - Initiate and maintain comfort rounds  - Make Fall Risk Sign visible to staff  - Offer Toileting every 2 Hours, in advance of need  - Initiate/Maintain alarm  - Obtain necessary fall risk management equipment:   - Apply yellow socks and bracelet for high fall risk patients  - Consider moving patient to room near nurses station  Outcome: Progressing  Goal: Maintain or return to baseline ADL function  Description: INTERVENTIONS:  -  Assess patient's ability to carry out ADLs; assess patient's baseline for ADL function and identify physical deficits which impact ability to perform ADLs (bathing, care of mouth/teeth, toileting, grooming, dressing, etc )  - Assess/evaluate cause of self-care deficits   - Assess range of motion  - Assess patient's mobility; develop plan if impaired  - Assess patient's need for assistive devices and provide as appropriate  - Encourage maximum independence but intervene and supervise when necessary  - Involve family in performance of ADLs  - Assess for home care needs following discharge   - Consider OT consult to assist with ADL evaluation and planning for discharge  - Provide patient education as appropriate  Outcome: Progressing  Goal: Maintains/Returns to pre admission functional level  Description: INTERVENTIONS:  - Perform BMAT or MOVE assessment daily    - Set and communicate daily mobility goal to care team and patient/family/caregiver     - Collaborate with rehabilitation services on mobility goals if consulted  - Out of bed for toileting  - Record patient progress and toleration of activity level   Outcome: Progressing     Problem: DISCHARGE PLANNING  Goal: Discharge to home or other facility with appropriate resources  Description: INTERVENTIONS:  - Identify barriers to discharge w/patient and caregiver  - Arrange for needed discharge resources and transportation as appropriate  - Identify discharge learning needs (meds, wound care, etc )  - Arrange for interpretive services to assist at discharge as needed  - Refer to Case Management Department for coordinating discharge planning if the patient needs post-hospital services based on physician/advanced practitioner order or complex needs related to functional status, cognitive ability, or social support system  Outcome: Progressing     Problem: Knowledge Deficit  Goal: Patient/family/caregiver demonstrates understanding of disease process, treatment plan, medications, and discharge instructions  Description: Complete learning assessment and assess knowledge base  Interventions:  - Provide teaching at level of understanding  - Provide teaching via preferred learning methods  Outcome: Progressing     Problem: MOBILITY - ADULT  Goal: Maintain or return to baseline ADL function  Description: INTERVENTIONS:  -  Assess patient's ability to carry out ADLs; assess patient's baseline for ADL function and identify physical deficits which impact ability to perform ADLs (bathing, care of mouth/teeth, toileting, grooming, dressing, etc )  - Assess/evaluate cause of self-care deficits   - Assess range of motion  - Assess patient's mobility; develop plan if impaired  - Assess patient's need for assistive devices and provide as appropriate  - Encourage maximum independence but intervene and supervise when necessary  - Involve family in performance of ADLs  - Assess for home care needs following discharge   - Consider OT consult to assist with ADL evaluation and planning for discharge  - Provide patient education as appropriate  Outcome: Progressing  Goal: Maintains/Returns to pre admission functional level  Description: INTERVENTIONS:  - Perform BMAT or MOVE assessment daily    - Set and communicate daily mobility goal to care team and patient/family/caregiver     - Collaborate with rehabilitation services on mobility goals if consulted  - Out of bed for toileting  - Record patient progress and toleration of activity level   Outcome: Progressing     Problem: Prexisting or High Potential for Compromised Skin Integrity  Goal: Skin integrity is maintained or improved  Description: INTERVENTIONS:  - Identify patients at risk for skin breakdown  - Assess and monitor skin integrity  - Assess and monitor nutrition and hydration status  - Monitor labs   - Assess for incontinence   - Turn and reposition patient  - Assist with mobility/ambulation  - Relieve pressure over bony prominences  - Avoid friction and shearing  - Provide appropriate hygiene as needed including keeping skin clean and dry  - Evaluate need for skin moisturizer/barrier cream  - Collaborate with interdisciplinary team   - Patient/family teaching  - Consider wound care consult   Outcome: Progressing

## 2022-05-13 NOTE — ASSESSMENT & PLAN NOTE
· S/p cervical fusion performed by Dr Yenifer Linn on 9/01/87, complicated by MRSA infection  · Completed 24 days of IV vancomycin, transitioned to daptomycin on 04/24-4/29    Discharged home on doxycycline 100 mg b i d    · Continue doxycycline  · apprec ID, not felt to be contributing to PIGN

## 2022-05-13 NOTE — ASSESSMENT & PLAN NOTE
· Prescribed Coumadin  · INR 4 0 on 05/09  Outpatient provider had instructed patient to hold dose on 5/9 and 5/10  · INR on admission on 05/10 was 3 15  Resume coumadin today as now subtherapeutic    · INR goal 2-3

## 2022-05-13 NOTE — PLAN OF CARE
Problem: PAIN - ADULT  Goal: Verbalizes/displays adequate comfort level or baseline comfort level  Description: Interventions:  - Encourage patient to monitor pain and request assistance  - Assess pain using appropriate pain scale  - Administer analgesics based on type and severity of pain and evaluate response  - Implement non-pharmacological measures as appropriate and evaluate response  - Consider cultural and social influences on pain and pain management  - Notify physician/advanced practitioner if interventions unsuccessful or patient reports new pain  Outcome: Progressing     Problem: INFECTION - ADULT  Goal: Absence or prevention of progression during hospitalization  Description: INTERVENTIONS:  - Assess and monitor for signs and symptoms of infection  - Monitor lab/diagnostic results  - Monitor all insertion sites, i e  indwelling lines, tubes, and drains  - Monitor endotracheal if appropriate and nasal secretions for changes in amount and color  - [place] appropriate cooling/warming therapies per order  - Administer medications as ordered  - Instruct and encourage patient and family to use good hand hygiene technique  - Identify and instruct in appropriate isolation precautions for identified infection/condition  Outcome: Progressing     Problem: SAFETY ADULT  Goal: Patient will remain free of falls  Description: INTERVENTIONS:  - Educate patient/family on patient safety including physical limitations  - Instruct patient to call for assistance with activity   - Consult OT/PT to assist with strengthening/mobility   - Keep Call bell within reach  - Keep bed low and locked with side rails adjusted as appropriate  - Keep care items and personal belongings within reach  - Initiate and maintain comfort rounds  - Make Fall Risk Sign visible to staff  - Offer Toileting every 2 Hours, in advance of need  - Initiate/Maintain alarm  - Obtain necessary fall risk management equipment:   - Apply yellow socks and bracelet for high fall risk patients  - Consider moving patient to room near nurses station  Outcome: Progressing

## 2022-05-13 NOTE — ASSESSMENT & PLAN NOTE
· Creatinine on admission: 5 04  · Direct admit due to rising creatinine outpatient  · Bilateral lower extremity 2+ pitting edema  · Prior admission: admitted on 04/22,  Discharged on 04/29 with creatinine 3 85  · Believed to be due to volume overload, postinfectious GN, Staph aureus bacteremia  · Renal biopsy was not performed  Patient and family are not interested in bx as felt to be invasive per their discussion with me  · Baseline prior to this event was 0 6-0 9  · Fluid restriction, monitor I/O  · Avoid nephrotoxins, hypotension, IV contrast  · Consult nephrology - await recommendations  No clear indications for emergency HD at this time  · Started on diuresis with good output  Cr stable at 5 but slightly trending up

## 2022-05-13 NOTE — ASSESSMENT & PLAN NOTE
· Home regimen: metoprolol succinate 100 mg daily and flecainide 100 mg b i d   · Continue metoprolol  flecanide on hold by cards d/t variable renal function  · Coumadin 2 5 mg 5x weekly, 5 mg 2x weekly  · INR 4 0 on 05/09  Outpatient provider had instructed patient to hold dose on 5/9 and 5/10  · INR on admission on 05/10 was 3 15  Resume coumadin as above    · INR goal 2-3

## 2022-05-14 LAB
ALBUMIN SERPL BCP-MCNC: 2.2 G/DL (ref 3.5–5)
ALP SERPL-CCNC: 90 U/L (ref 46–116)
ALT SERPL W P-5'-P-CCNC: 7 U/L (ref 12–78)
ANION GAP SERPL CALCULATED.3IONS-SCNC: 9 MMOL/L (ref 4–13)
AST SERPL W P-5'-P-CCNC: 16 U/L (ref 5–45)
ATRIAL RATE: 50 BPM
ATRIAL RATE: 56 BPM
BASOPHILS # BLD AUTO: 0.06 THOUSANDS/ΜL (ref 0–0.1)
BASOPHILS NFR BLD AUTO: 1 % (ref 0–1)
BILIRUB SERPL-MCNC: 0.6 MG/DL (ref 0.2–1)
BUN SERPL-MCNC: 47 MG/DL (ref 5–25)
CALCIUM ALBUM COR SERPL-MCNC: 10 MG/DL (ref 8.3–10.1)
CALCIUM SERPL-MCNC: 8.6 MG/DL (ref 8.3–10.1)
CHLORIDE SERPL-SCNC: 103 MMOL/L (ref 100–108)
CO2 SERPL-SCNC: 28 MMOL/L (ref 21–32)
CREAT SERPL-MCNC: 5.39 MG/DL (ref 0.6–1.3)
EOSINOPHIL # BLD AUTO: 0.33 THOUSAND/ΜL (ref 0–0.61)
EOSINOPHIL NFR BLD AUTO: 5 % (ref 0–6)
ERYTHROCYTE [DISTWIDTH] IN BLOOD BY AUTOMATED COUNT: 14.6 % (ref 11.6–15.1)
GFR SERPL CREATININE-BSD FRML MDRD: 9 ML/MIN/1.73SQ M
GLUCOSE SERPL-MCNC: 100 MG/DL (ref 65–140)
HCT VFR BLD AUTO: 24.3 % (ref 36.5–49.3)
HGB BLD-MCNC: 7.6 G/DL (ref 12–17)
IMM GRANULOCYTES # BLD AUTO: 0.02 THOUSAND/UL (ref 0–0.2)
IMM GRANULOCYTES NFR BLD AUTO: 0 % (ref 0–2)
INR PPP: 1.44 (ref 0.84–1.19)
LYMPHOCYTES # BLD AUTO: 1.81 THOUSANDS/ΜL (ref 0.6–4.47)
LYMPHOCYTES NFR BLD AUTO: 29 % (ref 14–44)
MCH RBC QN AUTO: 28.8 PG (ref 26.8–34.3)
MCHC RBC AUTO-ENTMCNC: 31.3 G/DL (ref 31.4–37.4)
MCV RBC AUTO: 92 FL (ref 82–98)
MONOCYTES # BLD AUTO: 0.64 THOUSAND/ΜL (ref 0.17–1.22)
MONOCYTES NFR BLD AUTO: 10 % (ref 4–12)
NEUTROPHILS # BLD AUTO: 3.49 THOUSANDS/ΜL (ref 1.85–7.62)
NEUTS SEG NFR BLD AUTO: 55 % (ref 43–75)
NRBC BLD AUTO-RTO: 0 /100 WBCS
P AXIS: 30 DEGREES
P AXIS: 74 DEGREES
PLATELET # BLD AUTO: 195 THOUSANDS/UL (ref 149–390)
PMV BLD AUTO: 10.5 FL (ref 8.9–12.7)
POTASSIUM SERPL-SCNC: 3.4 MMOL/L (ref 3.5–5.3)
PR INTERVAL: 242 MS
PR INTERVAL: 262 MS
PROT SERPL-MCNC: 6 G/DL (ref 6.4–8.2)
PROTHROMBIN TIME: 17.3 SECONDS (ref 11.6–14.5)
QRS AXIS: 53 DEGREES
QRS AXIS: 59 DEGREES
QRSD INTERVAL: 206 MS
QRSD INTERVAL: 218 MS
QT INTERVAL: 518 MS
QT INTERVAL: 548 MS
QTC INTERVAL: 499 MS
QTC INTERVAL: 499 MS
RBC # BLD AUTO: 2.64 MILLION/UL (ref 3.88–5.62)
SODIUM SERPL-SCNC: 140 MMOL/L (ref 136–145)
T WAVE AXIS: -23 DEGREES
T WAVE AXIS: -42 DEGREES
VENTRICULAR RATE: 50 BPM
VENTRICULAR RATE: 56 BPM
WBC # BLD AUTO: 6.35 THOUSAND/UL (ref 4.31–10.16)

## 2022-05-14 PROCEDURE — 99232 SBSQ HOSP IP/OBS MODERATE 35: CPT | Performed by: HOSPITALIST

## 2022-05-14 PROCEDURE — 87081 CULTURE SCREEN ONLY: CPT | Performed by: HOSPITALIST

## 2022-05-14 PROCEDURE — 93010 ELECTROCARDIOGRAM REPORT: CPT | Performed by: INTERNAL MEDICINE

## 2022-05-14 PROCEDURE — 99232 SBSQ HOSP IP/OBS MODERATE 35: CPT | Performed by: INTERNAL MEDICINE

## 2022-05-14 PROCEDURE — 80053 COMPREHEN METABOLIC PANEL: CPT | Performed by: HOSPITALIST

## 2022-05-14 PROCEDURE — 85610 PROTHROMBIN TIME: CPT | Performed by: HOSPITALIST

## 2022-05-14 PROCEDURE — 85025 COMPLETE CBC W/AUTO DIFF WBC: CPT | Performed by: HOSPITALIST

## 2022-05-14 RX ORDER — AMLODIPINE BESYLATE 5 MG/1
5 TABLET ORAL DAILY
Status: DISCONTINUED | OUTPATIENT
Start: 2022-05-15 | End: 2022-05-17

## 2022-05-14 RX ADMIN — TAMSULOSIN HYDROCHLORIDE 0.4 MG: 0.4 CAPSULE ORAL at 17:14

## 2022-05-14 RX ADMIN — AMLODIPINE BESYLATE 5 MG: 5 TABLET ORAL at 08:13

## 2022-05-14 RX ADMIN — PANTOPRAZOLE SODIUM 40 MG: 40 TABLET, DELAYED RELEASE ORAL at 17:14

## 2022-05-14 RX ADMIN — WARFARIN SODIUM 5 MG: 5 TABLET ORAL at 17:14

## 2022-05-14 RX ADMIN — PRAVASTATIN SODIUM 40 MG: 40 TABLET ORAL at 17:14

## 2022-05-14 RX ADMIN — DOXYCYCLINE 100 MG: 100 CAPSULE ORAL at 21:35

## 2022-05-14 RX ADMIN — PANTOPRAZOLE SODIUM 40 MG: 40 TABLET, DELAYED RELEASE ORAL at 05:15

## 2022-05-14 RX ADMIN — DOCUSATE SODIUM 100 MG: 100 CAPSULE, LIQUID FILLED ORAL at 08:13

## 2022-05-14 RX ADMIN — ACETAMINOPHEN 650 MG: 325 TABLET, FILM COATED ORAL at 21:39

## 2022-05-14 RX ADMIN — DULOXETINE 60 MG: 30 CAPSULE, DELAYED RELEASE ORAL at 08:13

## 2022-05-14 RX ADMIN — GABAPENTIN 100 MG: 100 CAPSULE ORAL at 08:13

## 2022-05-14 RX ADMIN — METOPROLOL SUCCINATE 100 MG: 50 TABLET, EXTENDED RELEASE ORAL at 08:13

## 2022-05-14 RX ADMIN — POTASSIUM CHLORIDE 40 MEQ: 1500 TABLET, EXTENDED RELEASE ORAL at 08:13

## 2022-05-14 RX ADMIN — GABAPENTIN 300 MG: 300 CAPSULE ORAL at 21:34

## 2022-05-14 RX ADMIN — DOXYCYCLINE 100 MG: 100 CAPSULE ORAL at 08:13

## 2022-05-14 NOTE — ASSESSMENT & PLAN NOTE
· Diagnosed during prior admission  · Biopsy was not performed during prior admit  · Postinfectious suspected  · Treatment per nephrology Render Post-Care Instructions In Note?: yes

## 2022-05-14 NOTE — PLAN OF CARE
Problem: PAIN - ADULT  Goal: Verbalizes/displays adequate comfort level or baseline comfort level  Description: Interventions:  - Encourage patient to monitor pain and request assistance  - Assess pain using appropriate pain scale  - Administer analgesics based on type and severity of pain and evaluate response  - Implement non-pharmacological measures as appropriate and evaluate response  - Consider cultural and social influences on pain and pain management  - Notify physician/advanced practitioner if interventions unsuccessful or patient reports new pain  Outcome: Progressing     Problem: INFECTION - ADULT  Goal: Absence or prevention of progression during hospitalization  Description: INTERVENTIONS:  - Assess and monitor for signs and symptoms of infection  - Monitor lab/diagnostic results  - Monitor all insertion sites, i e  indwelling lines, tubes, and drains  - Monitor endotracheal if appropriate and nasal secretions for changes in amount and color  - [place] appropriate cooling/warming therapies per order  - Administer medications as ordered  - Instruct and encourage patient and family to use good hand hygiene technique  - Identify and instruct in appropriate isolation precautions for identified infection/condition  Outcome: Progressing     Problem: SAFETY ADULT  Goal: Patient will remain free of falls  Description: INTERVENTIONS:  - Educate patient/family on patient safety including physical limitations  - Instruct patient to call for assistance with activity   - Consult OT/PT to assist with strengthening/mobility   - Keep Call bell within reach  - Keep bed low and locked with side rails adjusted as appropriate  - Keep care items and personal belongings within reach  - Initiate and maintain comfort rounds  - Make Fall Risk Sign visible to staff  - Offer Toileting every 2 Hours, in advance of need  - Initiate/Maintain alarm  - Obtain necessary fall risk management equipment:   - Apply yellow socks and bracelet for high fall risk patients  - Consider moving patient to room near nurses station  Outcome: Progressing  Goal: Maintain or return to baseline ADL function  Description: INTERVENTIONS:  -  Assess patient's ability to carry out ADLs; assess patient's baseline for ADL function and identify physical deficits which impact ability to perform ADLs (bathing, care of mouth/teeth, toileting, grooming, dressing, etc )  - Assess/evaluate cause of self-care deficits   - Assess range of motion  - Assess patient's mobility; develop plan if impaired  - Assess patient's need for assistive devices and provide as appropriate  - Encourage maximum independence but intervene and supervise when necessary  - Involve family in performance of ADLs  - Assess for home care needs following discharge   - Consider OT consult to assist with ADL evaluation and planning for discharge  - Provide patient education as appropriate  Outcome: Progressing  Goal: Maintains/Returns to pre admission functional level  Description: INTERVENTIONS:  - Perform BMAT or MOVE assessment daily    - Set and communicate daily mobility goal to care team and patient/family/caregiver     - Collaborate with rehabilitation services on mobility goals if consulted  - Out of bed for toileting  - Record patient progress and toleration of activity level   Outcome: Progressing     Problem: DISCHARGE PLANNING  Goal: Discharge to home or other facility with appropriate resources  Description: INTERVENTIONS:  - Identify barriers to discharge w/patient and caregiver  - Arrange for needed discharge resources and transportation as appropriate  - Identify discharge learning needs (meds, wound care, etc )  - Arrange for interpretive services to assist at discharge as needed  - Refer to Case Management Department for coordinating discharge planning if the patient needs post-hospital services based on physician/advanced practitioner order or complex needs related to functional status, cognitive ability, or social support system  Outcome: Progressing     Problem: Knowledge Deficit  Goal: Patient/family/caregiver demonstrates understanding of disease process, treatment plan, medications, and discharge instructions  Description: Complete learning assessment and assess knowledge base  Interventions:  - Provide teaching at level of understanding  - Provide teaching via preferred learning methods  Outcome: Progressing     Problem: MOBILITY - ADULT  Goal: Maintain or return to baseline ADL function  Description: INTERVENTIONS:  -  Assess patient's ability to carry out ADLs; assess patient's baseline for ADL function and identify physical deficits which impact ability to perform ADLs (bathing, care of mouth/teeth, toileting, grooming, dressing, etc )  - Assess/evaluate cause of self-care deficits   - Assess range of motion  - Assess patient's mobility; develop plan if impaired  - Assess patient's need for assistive devices and provide as appropriate  - Encourage maximum independence but intervene and supervise when necessary  - Involve family in performance of ADLs  - Assess for home care needs following discharge   - Consider OT consult to assist with ADL evaluation and planning for discharge  - Provide patient education as appropriate  Outcome: Progressing  Goal: Maintains/Returns to pre admission functional level  Description: INTERVENTIONS:  - Perform BMAT or MOVE assessment daily    - Set and communicate daily mobility goal to care team and patient/family/caregiver     - Collaborate with rehabilitation services on mobility goals if consulted  - Out of bed for toileting  - Record patient progress and toleration of activity level   Outcome: Progressing     Problem: Prexisting or High Potential for Compromised Skin Integrity  Goal: Skin integrity is maintained or improved  Description: INTERVENTIONS:  - Identify patients at risk for skin breakdown  - Assess and monitor skin integrity  - Assess and monitor nutrition and hydration status  - Monitor labs   - Assess for incontinence   - Turn and reposition patient  - Assist with mobility/ambulation  - Relieve pressure over bony prominences  - Avoid friction and shearing  - Provide appropriate hygiene as needed including keeping skin clean and dry  - Evaluate need for skin moisturizer/barrier cream  - Collaborate with interdisciplinary team   - Patient/family teaching  - Consider wound care consult   Outcome: Progressing     Problem: Potential for Falls  Goal: Patient will remain free of falls  Description: INTERVENTIONS:  - Educate patient/family on patient safety including physical limitations  - Instruct patient to call for assistance with activity   - Consult OT/PT to assist with strengthening/mobility   - Keep Call bell within reach  - Keep bed low and locked with side rails adjusted as appropriate  - Keep care items and personal belongings within reach  - Initiate and maintain comfort rounds  - Make Fall Risk Sign visible to staff  - Offer Toileting every 2 Hours, in advance of need  - Initiate/Maintain alarm  - Obtain necessary fall risk management equipment:   - Apply yellow socks and bracelet for high fall risk patients  - Consider moving patient to room near nurses station  Outcome: Progressing     Problem: Nutrition/Hydration-ADULT  Goal: Nutrient/Hydration intake appropriate for improving, restoring or maintaining nutritional needs  Description: Monitor and assess patient's nutrition/hydration status for malnutrition  Collaborate with interdisciplinary team and initiate plan and interventions as ordered  Monitor patient's weight and dietary intake as ordered or per policy  Utilize nutrition screening tool and intervene as necessary  Determine patient's food preferences and provide high-protein, high-caloric foods as appropriate       INTERVENTIONS:  - Monitor oral intake, urinary output, labs, and treatment plans  - Assess nutrition and hydration status and recommend course of action  - Evaluate amount of meals eaten  - Assist patient with eating if necessary   - Allow adequate time for meals  - Recommend/ encourage appropriate diets, oral nutritional supplements, and vitamin/mineral supplements  - Order, calculate, and assess calorie counts as needed  - Recommend, monitor, and adjust tube feedings and TPN/PPN based on assessed needs  - Assess need for intravenous fluids  - Provide specific nutrition/hydration education as appropriate  - Include patient/family/caregiver in decisions related to nutrition  Outcome: Progressing     Problem: METABOLIC, FLUID AND ELECTROLYTES - ADULT  Goal: Electrolytes maintained within normal limits  Description: INTERVENTIONS:  - Monitor labs and assess patient for signs and symptoms of electrolyte imbalances  - Administer electrolyte replacement as ordered  - Monitor response to electrolyte replacements, including repeat lab results as appropriate  - Instruct patient on fluid and nutrition as appropriate  Outcome: Progressing  Goal: Fluid balance maintained  Description: INTERVENTIONS:  - Monitor labs   - Monitor I/O and WT  - Instruct patient on fluid and nutrition as appropriate  - Assess for signs & symptoms of volume excess or deficit  Outcome: Progressing     Problem: SKIN/TISSUE INTEGRITY - ADULT  Goal: Skin Integrity remains intact(Skin Breakdown Prevention)  Description: Assess:  -Perform Matt assessment every x  -Clean and moisturize skin every x  -Inspect skin when repositioning, toileting, and assisting with ADLS  -Assess under medical devices such as x every x  -Assess extremities for adequate circulation and sensation     Bed Management:  -Have minimal linens on bed & keep smooth, unwrinkled  -Change linens as needed when moist or perspiring  -Avoid sitting or lying in one position for more than xxxx hours while in bed  -Keep HOB at [facility]     Toileting:  -Offer bedside commode  -Assess for incontinence every x  -Use incontinent care products after each incontinent episode such as x    Activity:  -Mobilize patient x times a day  -Encourage activity and walks on unit  -Encourage or provide ROM exercises   -Turn and reposition patient every x Hours  -Use appropriate equipment to lift or move patient in bed  -Instruct/ Assist with weight shifting every x when out of bed in chair  -Consider limitation of chair time x hour intervals    Skin Care:  -Avoid use of baby powder, tape, friction and shearing, hot water or constrictive clothing  -Relieve pressure over bony prominences using x  -Do not massage red bony areas    Next Steps:  -Teach patient strategies to minimize risks such as x   -Consider consults to  interdisciplinary teams such as x  Outcome: Progressing  Goal: Incision(s), wounds(s) or drain site(s) healing without S/S of infection  Description: INTERVENTIONS  - Assess and document dressing, incision, wound bed, drain sites and surrounding tissue  - Provide patient and family education  - Perform skin care/dressing changes every x  Outcome: Progressing     Problem: HEMATOLOGIC - ADULT  Goal: Maintains hematologic stability  Description: INTERVENTIONS  - Assess for signs and symptoms of bleeding or hemorrhage  - Monitor labs  - Administer supportive blood products/factors as ordered and appropriate  Outcome: Progressing     Problem: MUSCULOSKELETAL - ADULT  Goal: Maintain or return mobility to safest level of function  Description: INTERVENTIONS:  - Assess patient's ability to carry out ADLs; assess patient's baseline for ADL function and identify physical deficits which impact ability to perform ADLs (bathing, care of mouth/teeth, toileting, grooming, dressing, etc )  - Assess/evaluate cause of self-care deficits   - Assess range of motion  - Assess patient's mobility  - Assess patient's need for assistive devices and provide as appropriate  - Encourage maximum independence but intervene and supervise when necessary  - Involve family in performance of ADLs  - Assess for home care needs following discharge   - Consider OT consult to assist with ADL evaluation and planning for discharge  - Provide patient education as appropriate  Outcome: Progressing

## 2022-05-14 NOTE — PLAN OF CARE
Problem: PAIN - ADULT  Goal: Verbalizes/displays adequate comfort level or baseline comfort level  Description: Interventions:  - Encourage patient to monitor pain and request assistance  - Assess pain using appropriate pain scale  - Administer analgesics based on type and severity of pain and evaluate response  - Implement non-pharmacological measures as appropriate and evaluate response  - Consider cultural and social influences on pain and pain management  - Notify physician/advanced practitioner if interventions unsuccessful or patient reports new pain  Outcome: Progressing     Problem: INFECTION - ADULT  Goal: Absence or prevention of progression during hospitalization  Description: INTERVENTIONS:  - Assess and monitor for signs and symptoms of infection  - Monitor lab/diagnostic results  - Monitor all insertion sites, i e  indwelling lines, tubes, and drains  - Monitor endotracheal if appropriate and nasal secretions for changes in amount and color  - [place] appropriate cooling/warming therapies per order  - Administer medications as ordered  - Instruct and encourage patient and family to use good hand hygiene technique  - Identify and instruct in appropriate isolation precautions for identified infection/condition  Outcome: Progressing     Problem: SAFETY ADULT  Goal: Patient will remain free of falls  Description: INTERVENTIONS:  - Educate patient/family on patient safety including physical limitations  - Instruct patient to call for assistance with activity   - Consult OT/PT to assist with strengthening/mobility   - Keep Call bell within reach  - Keep bed low and locked with side rails adjusted as appropriate  - Keep care items and personal belongings within reach  - Initiate and maintain comfort rounds  - Make Fall Risk Sign visible to staff  - Offer Toileting every 2 Hours, in advance of need  - Initiate/Maintain alarm  - Obtain necessary fall risk management equipment:   - Apply yellow socks and bracelet for high fall risk patients  - Consider moving patient to room near nurses station  Outcome: Progressing  Goal: Maintain or return to baseline ADL function  Description: INTERVENTIONS:  -  Assess patient's ability to carry out ADLs; assess patient's baseline for ADL function and identify physical deficits which impact ability to perform ADLs (bathing, care of mouth/teeth, toileting, grooming, dressing, etc )  - Assess/evaluate cause of self-care deficits   - Assess range of motion  - Assess patient's mobility; develop plan if impaired  - Assess patient's need for assistive devices and provide as appropriate  - Encourage maximum independence but intervene and supervise when necessary  - Involve family in performance of ADLs  - Assess for home care needs following discharge   - Consider OT consult to assist with ADL evaluation and planning for discharge  - Provide patient education as appropriate  Outcome: Progressing  Goal: Maintains/Returns to pre admission functional level  Description: INTERVENTIONS:  - Perform BMAT or MOVE assessment daily    - Set and communicate daily mobility goal to care team and patient/family/caregiver     - Collaborate with rehabilitation services on mobility goals if consulted  - Out of bed for toileting  - Record patient progress and toleration of activity level   Outcome: Progressing     Problem: DISCHARGE PLANNING  Goal: Discharge to home or other facility with appropriate resources  Description: INTERVENTIONS:  - Identify barriers to discharge w/patient and caregiver  - Arrange for needed discharge resources and transportation as appropriate  - Identify discharge learning needs (meds, wound care, etc )  - Arrange for interpretive services to assist at discharge as needed  - Refer to Case Management Department for coordinating discharge planning if the patient needs post-hospital services based on physician/advanced practitioner order or complex needs related to functional status, cognitive ability, or social support system  Outcome: Progressing     Problem: Knowledge Deficit  Goal: Patient/family/caregiver demonstrates understanding of disease process, treatment plan, medications, and discharge instructions  Description: Complete learning assessment and assess knowledge base  Interventions:  - Provide teaching at level of understanding  - Provide teaching via preferred learning methods  Outcome: Progressing     Problem: MOBILITY - ADULT  Goal: Maintain or return to baseline ADL function  Description: INTERVENTIONS:  -  Assess patient's ability to carry out ADLs; assess patient's baseline for ADL function and identify physical deficits which impact ability to perform ADLs (bathing, care of mouth/teeth, toileting, grooming, dressing, etc )  - Assess/evaluate cause of self-care deficits   - Assess range of motion  - Assess patient's mobility; develop plan if impaired  - Assess patient's need for assistive devices and provide as appropriate  - Encourage maximum independence but intervene and supervise when necessary  - Involve family in performance of ADLs  - Assess for home care needs following discharge   - Consider OT consult to assist with ADL evaluation and planning for discharge  - Provide patient education as appropriate  Outcome: Progressing  Goal: Maintains/Returns to pre admission functional level  Description: INTERVENTIONS:  - Perform BMAT or MOVE assessment daily    - Set and communicate daily mobility goal to care team and patient/family/caregiver     - Collaborate with rehabilitation services on mobility goals if consulted  - Out of bed for toileting  - Record patient progress and toleration of activity level   Outcome: Progressing     Problem: Prexisting or High Potential for Compromised Skin Integrity  Goal: Skin integrity is maintained or improved  Description: INTERVENTIONS:  - Identify patients at risk for skin breakdown  - Assess and monitor skin integrity  - Assess and monitor nutrition and hydration status  - Monitor labs   - Assess for incontinence   - Turn and reposition patient  - Assist with mobility/ambulation  - Relieve pressure over bony prominences  - Avoid friction and shearing  - Provide appropriate hygiene as needed including keeping skin clean and dry  - Evaluate need for skin moisturizer/barrier cream  - Collaborate with interdisciplinary team   - Patient/family teaching  - Consider wound care consult   Outcome: Progressing

## 2022-05-14 NOTE — ASSESSMENT & PLAN NOTE
· Prescribed Coumadin  · INR 4 0 on 05/09  Outpatient provider had instructed patient to hold dose on 5/9 and 5/10  · INR on admission on 05/10 was 3 15    Cont coumadin 5 mg (higher than home dose) INR goal 2-3

## 2022-05-14 NOTE — ASSESSMENT & PLAN NOTE
· S/p cervical fusion performed by Dr Fabrice Drummond on 5/90/07, complicated by MRSA infection  · Completed 24 days of IV vancomycin, transitioned to daptomycin on 04/24-4/29    Discharged home on doxycycline 100 mg b i d    · Continue doxycycline  · apprec ID, not felt to be contributing to PIGN

## 2022-05-14 NOTE — PROGRESS NOTES
[facility] [place] [facility] PRO[facility]ESS [facility]   Nolan Arriaza 71 y o  male MRN: [id number]  Unit/Bed#: -01 Encounter: [id number]  Reason for Consult:  Acute kidney injury    ASSESSMENT and PLAN:    71-year-old male with a past medical history of CKD, factor 5 leiden deficiency, DVT, chronic diastolic heart failure who underwent cervical fusion March 11 complicated by MRSA surgical site infection and osteomyelitis and also acute kidney injury in April  Now presenting with nausea and worsening renal function  Nephrology on board for acute kidney injury  1) acute kidney injury    -baseline creatinine less than 1 mg/dL  -outpatient nephrologist is Dr Brandon Bills  -acute kidney injury in April possibly secondary to ATN versus post infectious GN  -creatinine plateau to high 3 on discharge  -creatinine now 5 mg/dL initially  -renal ultrasound-no hydronephrosis and no echogenicity  -serological workup-hepatitis panel, complements, anti double stranded DNA, ZAK, anti GBM, Anca titer, SPEP, UPEP, hemolysis smear were unrevealing  -renal biopsy discussions-reviewed note from nephrologist yesterday and patient agrees today also, that the risk of anticoagulation/low hemoglobin/prior negative serologies, the biopsy may be low yield and high risk  And therefore the patient is currently holding on biopsy  -initially received intravenous Lasix  -patient received Lasix and albumin on 05/13  -creatinine on 05/14 is stable 5 3    Plan  -I/O; avoid nephrotoxic agents  -BMP in a m   -avoid hypotension  -may ultimately need to proceed to renal biopsy but will need to correct anemia and monitor blood pressures closely    Currently patient is deferring biopsy for now  -check anti HUGH 2R completeness  -hold standing potassium supplement  -hold Lasix today    Called wife to update per pt request at 553 pm  No answer    2) nephrotic range proteinuria    -18 g U PCR and repeat 16 5 g  -serological workup unrevealing as above    3) acid/base-stable bicarbonate    4) electrolytes    -hypokalemia-on potassium chloride-hold standing potassium chloride starting 5/15    5) microscopic hematuria-possibly in setting of post infectious GN    -was seen by Urology  -negative serologies as above    6) volume overload-    -received intravenous Lasix during this admission  -hold Lasix 5/14    7) history of coagulopathic disorder-per primary team    8) anemia-iron level-iron saturation 20    9) surgical site infection-per primary team      SUBJECTIVE / 24H INTERVAL HISTORY:    Patient denies complaints  No shortness of breath  No pain      OBJECTIVE:  Current Weight: Weight - Scale: 106 kg (233 lb)  Vitals:    05/14/22 0600 05/14/22 0657 05/14/22 1209 05/14/22 1500   BP:  166/83  142/74   Pulse:  58  (!) 54   Resp:  14     Temp:  97 5 °F (36 4 °C)  97 5 °F (36 4 °C)   TempSrc:       SpO2:  98%  96%   Weight: 106 kg (234 lb 2 1 oz)  106 kg (233 lb)        Intake/Output Summary (Last 24 hours) at 5/14/2022 1638  Last data filed at 5/14/2022 1501  Gross per 24 hour   Intake 1420 ml   Output 2275 ml   Net -855 ml     General: NAD  Skin: no rash  Eyes: anicteric sclera  ENT: moist mucous membrane, positive neck brace  Neck: supple  Chest: CTA b/l, no ronchii, no wheeze, no rubs, no rales  CVS: s1s2, no murmur, no gallop, no rub  Abdomen: soft, nontender, nl sounds  Extremities:  Trace to 1+ edema LE b/l  : no thompson  Neuro: AAOX3  Psych: normal affect    Medications:    Current Facility-Administered Medications:     acetaminophen (TYLENOL) tablet 650 mg, 650 mg, Oral, Q6H PRN, Nelson Burnett PA-C, 650 mg at 05/13/22 2119    amLODIPine (NORVASC) tablet 5 mg, 5 mg, Oral, Daily, Jonelle Lou PA-C, 5 mg at 05/14/22 0813    docusate sodium (COLACE) capsule 100 mg, 100 mg, Oral, Daily, Jonelle Lou PA-C, 100 mg at 05/14/22 0813    doxycycline hyclate (VIBRAMYCIN) capsule 100 mg, 100 mg, Oral, Q12H [facility], Jonelle Lou PA-C, 100 mg at 05/14/22 0813    DULoxetine (CYMBALTA) delayed release capsule 60 mg, 60 mg, Oral, Daily, Jonelle Lou PA-C, 60 mg at 05/14/22 0813    gabapentin (NEURONTIN) capsule 100 mg, 100 mg, Oral, Daily, Jonelle Lou PA-C, 100 mg at 05/14/22 0813    gabapentin (NEURONTIN) capsule 300 mg, 300 mg, Oral, HS, Jonelle Lou PA-C, 300 mg at 05/13/22 2121    lidocaine (LIDODERM) 5 % patch 1 patch, 1 patch, Topical, HS, Annette Flores PA-C, 1 patch at 05/12/22 2143    methocarbamol (ROBAXIN) tablet 750 mg, 750 mg, Oral, Q6H PRN, Luis Carlos Sullivan PA-C, 750 mg at 05/13/22 2119    metoprolol succinate (TOPROL-XL) 24 hr tablet 100 mg, 100 mg, Oral, Daily, Jonelle Lou PA-C, 100 mg at 05/14/22 0813    ondansetron (ZOFRAN) injection 4 mg, 4 mg, Intravenous, Q4H PRN, Annette Flores PA-C, 4 mg at 05/12/22 2145    pantoprazole (PROTONIX) EC tablet 40 mg, 40 mg, Oral, BID AC, Jonelle Lou PA-C, 40 mg at 05/14/22 0515    potassium chloride (K-DUR,KLOR-CON) CR tablet 40 mEq, 40 mEq, Oral, Daily, Jhoan Cleveland MD, 40 mEq at 05/14/22 0813    pravastatin (PRAVACHOL) tablet 40 mg, 40 mg, Oral, Daily With Dinner, Luis Carlos Sullivan PA-C, 40 mg at 05/13/22 1705    tamsulosin (FLOMAX) capsule 0 4 mg, 0 4 mg, Oral, Daily With Dinner, Luis Carlos Sullivan PA-C, 0 4 mg at 05/13/22 1704    warfarin (COUMADIN) tablet 5 mg, 5 mg, Oral, Daily (warfarin), Jhoan Cleveland MD, 5 mg at 05/13/22 1705    Laboratory Results:  Results from last 7 days   Lab Units 05/14/22  0520 05/13/22  0451 05/12/22  0349 05/11/22  1338 05/11/22  0334 05/10/22  1950 05/10/22  1949   WBC Thousand/uL 6 35 6 79 6 34  --  5 31  --  6 08   HEMOGLOBIN g/dL 7 6* 7 7* 7 6*  --  7 1*  --  7 9*   HEMATOCRIT % 24 3* 23 9* 23 8*  --  22 3*  --  24 8*   PLATELETS Thousands/uL 195 206 197  --  192  --  237   POTASSIUM mmol/L 3 4* 3 3* 3 4* 3 7 3 2* 2 8*  --    CHLORIDE mmol/L 103 103 104 103 102 100  --    CO2 mmol/L 28 27 28 27 26 27  --    BUN mg/dL 47* 42* 39* 36* 35* 35*  --    CREATININE mg/dL 5 39* 5 50* 5 11* 4 99* 5 00* 5 04*  -- CALCIUM mg/dL 8 6 8 7 8 1* 7 9* 8 1* 7 9*  --    MAGNESIUM mg/dL  --   --   --   --   --  1 4*  --    PHOSPHORUS mg/dL  --   --   --   --   --  5 4*  --

## 2022-05-14 NOTE — ASSESSMENT & PLAN NOTE
· Creatinine on admission: 5 04  · Direct admit due to rising creatinine outpatient  · Bilateral lower extremity 2+ pitting edema  · Prior admission: admitted on 04/22,  Discharged on 04/29 with creatinine 3 85  · Believed to be due to volume overload, postinfectious GN, Staph aureus bacteremia  · Renal biopsy was not performed  Patient and family are not interested in bx as felt to be invasive per their discussion with me  · Baseline prior to this event was 0 6-0 9  · Fluid restriction, monitor I/O  · Avoid nephrotoxins, hypotension, IV contrast  · Apprec nephrology  No clear indications for emergency HD at this time  · Started on diuresis with good output  Cr approximately stable  Cont diuresis per nephrology  Unclear to what degree renal function will recover

## 2022-05-14 NOTE — ASSESSMENT & PLAN NOTE
Wt Readings from Last 3 Encounters:   05/14/22 106 kg (233 lb)   05/03/22 114 kg (251 lb)   04/29/22 113 kg (250 lb 1 6 oz)     · Prior echo 04/25/2022:  EF 49%, Diastolic function: grade 2 pseudonormal relaxation  · Monitor I/O and daily weights  · Does not take outpatient diuretics  IV Lasix with minimal results during prior admission    Patient had received albumin to help with anasarca  · Fluid restriction, 2 g sodium

## 2022-05-15 PROBLEM — E83.42 HYPOMAGNESEMIA: Status: RESOLVED | Noted: 2022-05-11 | Resolved: 2022-05-15

## 2022-05-15 LAB
ALBUMIN SERPL BCP-MCNC: 2.1 G/DL (ref 3.5–5)
ALP SERPL-CCNC: 86 U/L (ref 46–116)
ALT SERPL W P-5'-P-CCNC: 8 U/L (ref 12–78)
ANION GAP SERPL CALCULATED.3IONS-SCNC: 10 MMOL/L (ref 4–13)
AST SERPL W P-5'-P-CCNC: 15 U/L (ref 5–45)
BASOPHILS # BLD AUTO: 0.07 THOUSANDS/ΜL (ref 0–0.1)
BASOPHILS NFR BLD AUTO: 1 % (ref 0–1)
BILIRUB SERPL-MCNC: 0.5 MG/DL (ref 0.2–1)
BUN SERPL-MCNC: 48 MG/DL (ref 5–25)
CALCIUM ALBUM COR SERPL-MCNC: 10 MG/DL (ref 8.3–10.1)
CALCIUM SERPL-MCNC: 8.5 MG/DL (ref 8.3–10.1)
CHLORIDE SERPL-SCNC: 102 MMOL/L (ref 100–108)
CO2 SERPL-SCNC: 27 MMOL/L (ref 21–32)
CREAT SERPL-MCNC: 5.21 MG/DL (ref 0.6–1.3)
EOSINOPHIL # BLD AUTO: 0.29 THOUSAND/ΜL (ref 0–0.61)
EOSINOPHIL NFR BLD AUTO: 5 % (ref 0–6)
ERYTHROCYTE [DISTWIDTH] IN BLOOD BY AUTOMATED COUNT: 14.3 % (ref 11.6–15.1)
GFR SERPL CREATININE-BSD FRML MDRD: 10 ML/MIN/1.73SQ M
GLUCOSE SERPL-MCNC: 99 MG/DL (ref 65–140)
HCT VFR BLD AUTO: 25.4 % (ref 36.5–49.3)
HGB BLD-MCNC: 8 G/DL (ref 12–17)
IMM GRANULOCYTES # BLD AUTO: 0.01 THOUSAND/UL (ref 0–0.2)
IMM GRANULOCYTES NFR BLD AUTO: 0 % (ref 0–2)
INR PPP: 1.84 (ref 0.84–1.19)
LYMPHOCYTES # BLD AUTO: 1.72 THOUSANDS/ΜL (ref 0.6–4.47)
LYMPHOCYTES NFR BLD AUTO: 29 % (ref 14–44)
MCH RBC QN AUTO: 28.9 PG (ref 26.8–34.3)
MCHC RBC AUTO-ENTMCNC: 31.5 G/DL (ref 31.4–37.4)
MCV RBC AUTO: 92 FL (ref 82–98)
MONOCYTES # BLD AUTO: 0.64 THOUSAND/ΜL (ref 0.17–1.22)
MONOCYTES NFR BLD AUTO: 11 % (ref 4–12)
MRSA NOSE QL CULT: NORMAL
NEUTROPHILS # BLD AUTO: 3.19 THOUSANDS/ΜL (ref 1.85–7.62)
NEUTS SEG NFR BLD AUTO: 54 % (ref 43–75)
NRBC BLD AUTO-RTO: 0 /100 WBCS
PLATELET # BLD AUTO: 195 THOUSANDS/UL (ref 149–390)
PMV BLD AUTO: 10.9 FL (ref 8.9–12.7)
POTASSIUM SERPL-SCNC: 3.3 MMOL/L (ref 3.5–5.3)
PROT SERPL-MCNC: 5.9 G/DL (ref 6.4–8.2)
PROTHROMBIN TIME: 20.9 SECONDS (ref 11.6–14.5)
RBC # BLD AUTO: 2.77 MILLION/UL (ref 3.88–5.62)
SODIUM SERPL-SCNC: 139 MMOL/L (ref 136–145)
WBC # BLD AUTO: 5.92 THOUSAND/UL (ref 4.31–10.16)

## 2022-05-15 PROCEDURE — 80053 COMPREHEN METABOLIC PANEL: CPT | Performed by: HOSPITALIST

## 2022-05-15 PROCEDURE — 85025 COMPLETE CBC W/AUTO DIFF WBC: CPT | Performed by: HOSPITALIST

## 2022-05-15 PROCEDURE — 99232 SBSQ HOSP IP/OBS MODERATE 35: CPT | Performed by: INTERNAL MEDICINE

## 2022-05-15 PROCEDURE — 85610 PROTHROMBIN TIME: CPT | Performed by: HOSPITALIST

## 2022-05-15 PROCEDURE — 99232 SBSQ HOSP IP/OBS MODERATE 35: CPT | Performed by: HOSPITALIST

## 2022-05-15 RX ORDER — POTASSIUM CHLORIDE 20 MEQ/1
40 TABLET, EXTENDED RELEASE ORAL ONCE
Status: COMPLETED | OUTPATIENT
Start: 2022-05-15 | End: 2022-05-15

## 2022-05-15 RX ORDER — LIDOCAINE 50 MG/G
1 PATCH TOPICAL DAILY PRN
Status: DISCONTINUED | OUTPATIENT
Start: 2022-05-15 | End: 2022-06-04 | Stop reason: HOSPADM

## 2022-05-15 RX ADMIN — DOXYCYCLINE 100 MG: 100 CAPSULE ORAL at 21:29

## 2022-05-15 RX ADMIN — METOPROLOL SUCCINATE 100 MG: 50 TABLET, EXTENDED RELEASE ORAL at 08:20

## 2022-05-15 RX ADMIN — GABAPENTIN 300 MG: 300 CAPSULE ORAL at 21:29

## 2022-05-15 RX ADMIN — ACETAMINOPHEN 650 MG: 325 TABLET, FILM COATED ORAL at 21:29

## 2022-05-15 RX ADMIN — PRAVASTATIN SODIUM 40 MG: 40 TABLET ORAL at 16:59

## 2022-05-15 RX ADMIN — WARFARIN SODIUM 5 MG: 5 TABLET ORAL at 17:00

## 2022-05-15 RX ADMIN — PANTOPRAZOLE SODIUM 40 MG: 40 TABLET, DELAYED RELEASE ORAL at 16:59

## 2022-05-15 RX ADMIN — POTASSIUM CHLORIDE 40 MEQ: 1500 TABLET, EXTENDED RELEASE ORAL at 10:00

## 2022-05-15 RX ADMIN — DOXYCYCLINE 100 MG: 100 CAPSULE ORAL at 08:20

## 2022-05-15 RX ADMIN — DULOXETINE 60 MG: 30 CAPSULE, DELAYED RELEASE ORAL at 08:20

## 2022-05-15 RX ADMIN — METHOCARBAMOL TABLETS 750 MG: 500 TABLET, COATED ORAL at 21:28

## 2022-05-15 RX ADMIN — AMLODIPINE BESYLATE 5 MG: 5 TABLET ORAL at 08:20

## 2022-05-15 RX ADMIN — TAMSULOSIN HYDROCHLORIDE 0.4 MG: 0.4 CAPSULE ORAL at 16:59

## 2022-05-15 RX ADMIN — GABAPENTIN 100 MG: 100 CAPSULE ORAL at 08:20

## 2022-05-15 RX ADMIN — DOCUSATE SODIUM 100 MG: 100 CAPSULE, LIQUID FILLED ORAL at 08:20

## 2022-05-15 RX ADMIN — LIDOCAINE 5% 1 PATCH: 700 PATCH TOPICAL at 05:49

## 2022-05-15 RX ADMIN — PANTOPRAZOLE SODIUM 40 MG: 40 TABLET, DELAYED RELEASE ORAL at 05:18

## 2022-05-15 NOTE — PROGRESS NOTES
Cardiology Progress Note - Yasmine Hughes 71 y o  male MRN: [id number]    Unit/Bed#: -01 Encounter: [id number]      Assessment:  Principal Problem:    JANEL (acute kidney injury) ([place] [address] )  Active Problems:    Essential hypertension    WILL (obstructive sleep apnea)    Factor V Leiden mutation ([place] [address] )    Depression, major, single episode, moderate (HCC)    Paroxysmal A-fib ([place] [address] )    Mixed hyperlipidemia    Weakness    Anemia    Surgical site infection    Glomerulonephritis    Chronic diastolic (congestive) heart failure (HCC)    Hypokalemia      Plan:    1  Paroxysmal atrial fibrillation - Prior history of an atrial flutter ablation in 2015  Has been maintained on Toprol-XL and flecainide as an outpatient and has been in sinus rhythm  However given acute kidney injury and widening of the QRS, flecainide is currently on hold  If creatinine continues to improve we could restart 50 mg twice daily at some point  For now we will observe off of antiarrhythmic therapy and continue Toprol  If atrial fibrillation recurs amiodarone is another consideration  On warfarin for stroke prevention  Patient also has a history of factor 5 Leiden mutation  2   Hypertension - BP elevated at times, in the setting of his acute illness  Continue amlodipine and Toprol-XL and follow this closely into the outpatient setting  Could add hydralazine if needed  Subjective:   Patient seen and examined  No significant events overnight  Patient continues to recover  Ambulating the nagel with walker  Creatinine slowly improving  Objective:     Vitals: Blood pressure 164/80, pulse 55, temperature 97 5 °F (36 4 °C), resp  rate 18, weight 106 kg (232 lb 11 2 oz), SpO2 95 %  , Body mass index is 32 46 kg/m² ,   Orthostatic Blood Pressures    Flowsheet Row Most Recent Value   Blood Pressure 164/80 filed at 05/15/2022 0754   Patient Position - Orthostatic VS Lying filed at 05/12/2022 2150            Intake/Output Summary (Last 24 hours) at 5/15/2022 1340  Last data filed at 5/15/2022 1333  Gross per 24 hour   Intake 660 ml   Output 3025 ml   Net -2365 ml       No significant arrhythmias seen on telemetry review  Physical Exam:    GEN: Laly Delgado appears well, alert and oriented x 3, pleasant and cooperative   HEENT: pupils equal, round, and reactive to light; extraocular muscles intact  NECK: supple, no carotid bruits   HEART: regular rhythm, mackenzie S1 and S2, soft systolic ejection murmur, clicks, gallops or rubs   LUNGS: clear to auscultation bilaterally; no wheezes, rales, or rhonchi   ABDOMEN: normal bowel sounds, soft, no tenderness, no distention  EXTREMITIES: peripheral pulses normal; no clubbing, cyanosis   + edema  NEURO: no focal findings   SKIN: normal without suspicious lesions on exposed skin    Medications:      Current Facility-Administered Medications:     acetaminophen (TYLENOL) tablet 650 mg, 650 mg, Oral, Q6H PRN, Ezell Dandy, PA-C, 650 mg at 05/14/22 2139    amLODIPine (NORVASC) tablet 5 mg, 5 mg, Oral, Daily, Olivia Skelton MD, 5 mg at 05/15/22 0820    docusate sodium (COLACE) capsule 100 mg, 100 mg, Oral, Daily, Jonelle Lou PA-C, 100 mg at 05/15/22 0820    doxycycline hyclate (VIBRAMYCIN) capsule 100 mg, 100 mg, Oral, Q12H [address], Jonelle Lou PA-C, 100 mg at 05/15/22 0820    DULoxetine (CYMBALTA) delayed release capsule 60 mg, 60 mg, Oral, Daily, Jonelle Lou PA-C, 60 mg at 05/15/22 0820    gabapentin (NEURONTIN) capsule 100 mg, 100 mg, Oral, Daily, Jonelle Lou PA-C, 100 mg at 05/15/22 0820    gabapentin (NEURONTIN) capsule 300 mg, 300 mg, Oral, HS, Jonelle Lou PA-C, 300 mg at 05/14/22 2134    lidocaine (LIDODERM) 5 % patch 1 patch, 1 patch, Topical, Daily PRN, Annette Flores PA-C, 1 patch at 05/15/22 0549    methocarbamol (ROBAXIN) tablet 750 mg, 750 mg, Oral, Q6H PRN, Ezell Dandy, PA-C, 750 mg at 05/13/22 2119    metoprolol succinate (TOPROL-XL) 24 hr tablet 100 mg, 100 mg, Oral, Daily, Jonelle Lou PA-C, 100 mg at 05/15/22 0820    ondansetron (ZOFRAN) injection 4 mg, 4 mg, Intravenous, Q4H PRN, Annette Flores PA-C, 4 mg at 05/12/22 2145    pantoprazole (PROTONIX) EC tablet 40 mg, 40 mg, Oral, BID AC, Jonelle Lou PA-C, 40 mg at 05/15/22 0518    pravastatin (PRAVACHOL) tablet 40 mg, 40 mg, Oral, Daily With Dinner, Val Olsen PA-C, 40 mg at 05/14/22 1714    tamsulosin (FLOMAX) capsule 0 4 mg, 0 4 mg, Oral, Daily With Dinner, Val Olsen PA-C, 0 4 mg at 05/14/22 1714    warfarin (COUMADIN) tablet 5 mg, 5 mg, Oral, Daily (warfarin), Cameron Ty MD, 5 mg at 05/14/22 1714     Labs & Results:        Results from last 7 days   Lab Units 05/15/22  0512 05/14/22  0520 05/13/22  0451   WBC Thousand/uL 5 92 6 35 6 79   HEMOGLOBIN g/dL 8 0* 7 6* 7 7*   HEMATOCRIT % 25 4* 24 3* 23 9*   PLATELETS Thousands/uL 195 195 206         Results from last 7 days   Lab Units 05/15/22  0512 05/14/22  0520 05/13/22 0451   POTASSIUM mmol/L 3 3* 3 4* 3 3*   CHLORIDE mmol/L 102 103 103   CO2 mmol/L 27 28 27   BUN mg/dL 48* 47* 42*   CREATININE mg/dL 5 21* 5 39* 5 50*   CALCIUM mg/dL 8 5 8 6 8 7   ALK PHOS U/L 86 90 91   ALT U/L 8* 7* 11*   AST U/L 15 16 10     Results from last 7 days   Lab Units 05/15/22  0512 05/14/22  0459 05/13/22  0451   INR  1 84* 1 44* 1 68*     Results from last 7 days   Lab Units 05/10/22  1950   MAGNESIUM mg/dL 1 4*         EKG personally reviewed by Dain Yanez MD   Sinus rhythm    ECHO:    Left Ventricle: Left ventricular cavity size is mildly dilated  Wall thickness is mildly increased  The left ventricular ejection fraction is 55%  Systolic function is normal  Wall motion is normal  Diastolic function is moderately abnormal, consistent with grade II (pseudonormal) relaxation    Right Ventricle: Right ventricular cavity size is mildly dilated    Left Atrium: The atrium is mildly dilated    Right Atrium: The atrium is mildly dilated    Aortic Valve: The aortic valve is trileaflet   The leaflets are not thickened  The leaflets are moderately calcified  There is mildly reduced mobility  There is mild stenosis  The aortic valve velocity is increased due to stenosis    Mitral Valve: There is mild regurgitation    Tricuspid Valve: There is mild regurgitation  Counseling / Coordination of Care  Total floor / unit time spent today 25 minutes  Greater than 50% of total time was spent with the patient and / or family counseling and / or coordination of care

## 2022-05-15 NOTE — ASSESSMENT & PLAN NOTE
Wt Readings from Last 3 Encounters:   05/15/22 106 kg (232 lb 11 2 oz)   05/03/22 114 kg (251 lb)   04/29/22 113 kg (250 lb 1 6 oz)     · Prior echo 04/25/2022:  EF 78%, Diastolic function: grade 2 pseudonormal relaxation  · Monitor I/O and daily weights  · Does not take outpatient diuretics  IV Lasix with minimal results during prior admission    Patient had received albumin to help with anasarca  · Fluid restriction, 2 g sodium

## 2022-05-15 NOTE — PROGRESS NOTES
[place]  Progress Note - Yasmine Hughes 1950, 71 y o  male MRN: [id number]  Unit/Bed#: -Yaya Encounter: [id number]  Primary Care Provider: Neil Washington MD   Date and time admitted to hospital: 5/10/2022  6:51 PM    Hypokalemia  Assessment & Plan  · Potassium 2 8 on admission  · Ordered 40 mEq p o  potassium and 20 mEq IV potassium   · On telemetry   · Continue to monitor and replete as needed    Chronic diastolic (congestive) heart failure (HCC)  Assessment & Plan  Wt Readings from Last 3 Encounters:   05/15/22 106 kg (232 lb 11 2 oz)   05/03/22 114 kg (251 lb)   04/29/22 113 kg (250 lb 1 6 oz)     · Prior echo 04/25/2022:  EF 35%, Diastolic function: grade 2 pseudonormal relaxation  · Monitor I/O and daily weights  · Does not take outpatient diuretics  IV Lasix with minimal results during prior admission  Patient had received albumin to help with anasarca  · Fluid restriction, 2 g sodium    Glomerulonephritis  Assessment & Plan  · Diagnosed during prior admission  · Biopsy was not performed during prior admit  · Postinfectious suspected  · Treatment per nephrology     Surgical site infection  Assessment & Plan  · S/p cervical fusion performed by Dr Fabrice Drummond on 3/72/04, complicated by MRSA infection  · Completed 24 days of IV vancomycin, transitioned to daptomycin on 04/24-4/29  Discharged home on doxycycline 100 mg b i d    · Continue doxycycline  · apprec ID, not felt to be contributing to PIGN     Anemia  Assessment & Plan  · Hemoglobin on admission:  7 9  · Patient with hematuria  Believed to be due to GN  · During prior admission, new normocytic anemia, hemoglobin had been 6 6, received 1 unit of PRBCs on 04/22  · FOBT was negative  · Transfuse if hemoglobin less than 7    Weakness  Assessment & Plan  · Reports ongoing weakness and fatigue    Per wife patient spends majority of the day napping  · Denies recent falls  · Suspect ongoing weakness/fatigue due to ongoing infection, JANEL, GN and deconditioning from frequent hospital admissions  · Consult PT/OT    Mixed hyperlipidemia  Assessment & Plan  · Continue statin    Paroxysmal A-fib (HCC)  Assessment & Plan  · Home regimen: metoprolol succinate 100 mg daily and flecainide 100 mg b i d   · Continue metoprolol  flecanide on hold by cards d/t variable renal function  · Coumadin 2 5 mg 5x weekly, 5 mg 2x weekly  · INR 4 0 on 05/09  Outpatient provider had instructed patient to hold dose on 5/9 and 5/10  · INR on admission on 05/10 was 3 15  Resume coumadin as above  · INR goal 2-3     Depression, major, single episode, moderate (HCC)  Assessment & Plan  · Continue Cymbalta    [facility]  Assessment & Plan  · Prescribed Coumadin  · INR 4 0 on 05/09  Outpatient provider had instructed patient to hold dose on 5/9 and 5/10  · INR on admission on 05/10 was 3 15  Cont coumadin 5 mg (higher than home dose) INR goal 2-3    WILL (obstructive sleep apnea)  Assessment & Plan  · CPAP HS - patient brought home machine in    Essential hypertension  Assessment & Plan  · Home regimen:  Amlodipine 5 mg daily and metoprolol succinate 100 mg daily    * JANEL (acute kidney injury) ([facility] [address] )  Assessment & Plan  · Creatinine on admission: 5 04  · Direct admit due to rising creatinine outpatient  · Bilateral lower extremity 2+ pitting edema  · Prior admission: admitted on 04/22,  Discharged on 04/29 with creatinine 3 85  · Believed to be due to volume overload, postinfectious GN, Staph aureus bacteremia  · Renal biopsy was not performed  Patient and family are not interested in bx as felt to be invasive per their discussion with me  Felt to be lower yield per nephrology correspondence  · Baseline prior to this event was 0 6-0 9  · Fluid restriction, monitor I/O  · Avoid nephrotoxins, hypotension, IV contrast  · Apprec nephrology  No clear indications for emergency HD at this time  · Started on diuresis with good output   Cr approximately stable to slight improvement  Completed diuresis  Unclear to what degree renal function will recover  Hypomagnesemia-resolved as of 5/15/2022  Assessment & Plan  · Magnesium 1 4 on admission  · Ordered 1 g IV magnesium   · Continue to monitor and replete as needed       VTE  Prophylaxis:   Pharmacologic: in place    Patient Centered Rounds: I have performed bedside rounds with nursing staff today  Discussions with Specialists or Other Care Team Provider: case management    Education and Discussions with Family / Patient: pt    Current Length of Stay: 5 day(s)    Current Patient Status: Inpatient        Code Status: Level 3 - DNAR and DNI      Subjective:   Resting comfortably   Requesting ginger ale  Otherwise no sob, no confusion, no headache   Reports urine outpt is not dark  Patient is seen and examined at bedside  All other ROS are negative  Objective:     Vitals:   Temp (24hrs), Av 5 °F (36 4 °C), Min:97 5 °F (36 4 °C), Max:97 5 °F (36 4 °C)    Temp:  [97 5 °F (36 4 °C)] 97 5 °F (36 4 °C)  HR:  [54-56] 55  Resp:  [18] 18  BP: (138-180)/(66-91) 164/80  SpO2:  [95 %-98 %] 95 %  Body mass index is 32 46 kg/m²  Input and Output Summary (last 24 hours): Intake/Output Summary (Last 24 hours) at 5/15/2022 1248  Last data filed at 5/15/2022 0801  Gross per 24 hour   Intake 660 ml   Output 2675 ml   Net -2015 ml       Physical Exam:       GEN: No acute distress, comfortable in c collar    HEEENT: No JVD, PERRLA, no scleral icterus  RESP: Lungs clear to auscultation bilaterally  CV: RRR, +s1/s2   ABD: SOFT NON TENDER, POSITIVE BOWEL SOUNDS, NO DISTENTION  PSYCH: CALM  NEURO: A X O X 3, NO FOCAL DEFICITS  SKIN: NO RASH  EXTREM: reduced EDEMA    Additional Data:     Labs:    Results from last 7 days   Lab Units 05/15/22  0512   WBC Thousand/uL 5 92   HEMOGLOBIN g/dL 8 0*   HEMATOCRIT % 25 4*   PLATELETS Thousands/uL 195   NEUTROS PCT % 54   LYMPHS PCT % 29   MONOS PCT % 11   EOS PCT % 5     Results from last 7 days   Lab Units 05/15/22  0512   SODIUM mmol/L 139   POTASSIUM mmol/L 3 3*   CHLORIDE mmol/L 102   CO2 mmol/L 27   BUN mg/dL 48*   CREATININE mg/dL 5 21*   ANION GAP mmol/L 10   CALCIUM mg/dL 8 5   ALBUMIN g/dL 2 1*   TOTAL BILIRUBIN mg/dL 0 50   ALK PHOS U/L 86   ALT U/L 8*   AST U/L 15   GLUCOSE RANDOM mg/dL 99     Results from last 7 days   Lab Units 05/15/22  0512   INR  1 84*             Results from last 7 days   Lab Units 05/10/22  1949   LACTIC ACID mmol/L 1 4           * I Have Reviewed All Lab Data Listed Above  Imaging:     Results for orders placed during the hospital encounter of 04/22/22    XR chest 1 view portable    Addendum 4/22/2022  8:39 PM  ADDENDUM:    PICC line tip projects over the region of the mid SVC  Narrative  CHEST    INDICATION:   weakness  COMPARISON:  None    EXAM PERFORMED/VIEWS:  XR CHEST PORTABLE      FINDINGS:    Cardiomediastinal silhouette appears unremarkable  The lungs are clear  No pneumothorax or pleural effusion  Osseous structures appear within normal limits for patient age  Impression  No acute cardiopulmonary disease  Workstation performed: [id number]    No results found for this or any previous visit  *I have reviewed all imaging reports listed above      Recent Cultures (last 7 days):     Results from last 7 days   Lab Units 05/10/22  2154 05/10/22  2022 05/10/22  1953   BLOOD CULTURE   --  No Growth After 4 Days  No Growth After 4 Days     URINE CULTURE  No Growth <1000 cfu/mL  --   --        Last 24 Hours Medication List:   Current Facility-Administered Medications   Medication Dose Route Frequency Provider Last Rate    acetaminophen  650 mg Oral Q6H PRN Scarlett Post PA-C      amLODIPine  5 mg Oral Daily Ned Cesar MD      docusate sodium  100 mg Oral Daily Scarlett Post PA-C      doxycycline hyclate  100 mg Oral Q12H [address] Scarlett Post PA-C      DULoxetine  60 mg Oral Daily Scarlett Post PA-ALTAGRACIA      gabapentin  100 mg Oral Daily Cinderella Gehrig, PA-ALTAGRACIA      gabapentin  300 mg Oral HS Cinderella Gehrig, FABIOLA      lidocaine  1 patch Topical Daily PRN Renae Crouch, FABIOLA      methocarbamol  750 mg Oral Q6H PRN Cinderella Gehrig, PA-ALTAGRACIA      metoprolol succinate  100 mg Oral Daily Cinderella Gehrig, PA-ALTAGRACIA      ondansetron  4 mg Intravenous Q4H PRN Annette Flores, FABIOLA      pantoprazole  40 mg Oral BID AC Jonelle Lou, FABIOLA      pravastatin  40 mg Oral Daily With Azam Backbone, FABIOLA      tamsulosin  0 4 mg Oral Daily With Dinner Cinderella Gehrig, FABIOLA      warfarin  5 mg Oral Daily (warfarin) Kamini Newman MD          Today, Patient Was Seen By: Kamini Newman MD    ** Please Note: Dictation voice to text software may have been used in the creation of this document   **

## 2022-05-15 NOTE — PLAN OF CARE
Problem: PAIN - ADULT  Goal: Verbalizes/displays adequate comfort level or baseline comfort level  Description: Interventions:  - Encourage patient to monitor pain and request assistance  - Assess pain using appropriate pain scale  - Administer analgesics based on type and severity of pain and evaluate response  - Implement non-pharmacological measures as appropriate and evaluate response  - Consider cultural and social influences on pain and pain management  - Notify physician/advanced practitioner if interventions unsuccessful or patient reports new pain  Outcome: Progressing     Problem: INFECTION - ADULT  Goal: Absence or prevention of progression during hospitalization  Description: INTERVENTIONS:  - Assess and monitor for signs and symptoms of infection  - Monitor lab/diagnostic results  - Monitor all insertion sites, i e  indwelling lines, tubes, and drains  - Monitor endotracheal if appropriate and nasal secretions for changes in amount and color  - [place] appropriate cooling/warming therapies per order  - Administer medications as ordered  - Instruct and encourage patient and family to use good hand hygiene technique  - Identify and instruct in appropriate isolation precautions for identified infection/condition  Outcome: Progressing     Problem: SAFETY ADULT  Goal: Patient will remain free of falls  Description: INTERVENTIONS:  - Educate patient/family on patient safety including physical limitations  - Instruct patient to call for assistance with activity   - Consult OT/PT to assist with strengthening/mobility   - Keep Call bell within reach  - Keep bed low and locked with side rails adjusted as appropriate  - Keep care items and personal belongings within reach  - Initiate and maintain comfort rounds  - Make Fall Risk Sign visible to staff  - Offer Toileting every 2 Hours, in advance of need  - Initiate/Maintain alarm  - Obtain necessary fall risk management equipment:   - Apply yellow socks and bracelet for high fall risk patients  - Consider moving patient to room near nurses station  Outcome: Progressing  Goal: Maintain or return to baseline ADL function  Description: INTERVENTIONS:  -  Assess patient's ability to carry out ADLs; assess patient's baseline for ADL function and identify physical deficits which impact ability to perform ADLs (bathing, care of mouth/teeth, toileting, grooming, dressing, etc )  - Assess/evaluate cause of self-care deficits   - Assess range of motion  - Assess patient's mobility; develop plan if impaired  - Assess patient's need for assistive devices and provide as appropriate  - Encourage maximum independence but intervene and supervise when necessary  - Involve family in performance of ADLs  - Assess for home care needs following discharge   - Consider OT consult to assist with ADL evaluation and planning for discharge  - Provide patient education as appropriate  Outcome: Progressing  Goal: Maintains/Returns to pre admission functional level  Description: INTERVENTIONS:  - Perform BMAT or MOVE assessment daily    - Set and communicate daily mobility goal to care team and patient/family/caregiver     - Collaborate with rehabilitation services on mobility goals if consulted  - Out of bed for toileting  - Record patient progress and toleration of activity level   Outcome: Progressing     Problem: DISCHARGE PLANNING  Goal: Discharge to home or other facility with appropriate resources  Description: INTERVENTIONS:  - Identify barriers to discharge w/patient and caregiver  - Arrange for needed discharge resources and transportation as appropriate  - Identify discharge learning needs (meds, wound care, etc )  - Arrange for interpretive services to assist at discharge as needed  - Refer to Case Management Department for coordinating discharge planning if the patient needs post-hospital services based on physician/advanced practitioner order or complex needs related to functional status, cognitive ability, or social support system  Outcome: Progressing     Problem: Knowledge Deficit  Goal: Patient/family/caregiver demonstrates understanding of disease process, treatment plan, medications, and discharge instructions  Description: Complete learning assessment and assess knowledge base  Interventions:  - Provide teaching at level of understanding  - Provide teaching via preferred learning methods  Outcome: Progressing     Problem: MOBILITY - ADULT  Goal: Maintain or return to baseline ADL function  Description: INTERVENTIONS:  -  Assess patient's ability to carry out ADLs; assess patient's baseline for ADL function and identify physical deficits which impact ability to perform ADLs (bathing, care of mouth/teeth, toileting, grooming, dressing, etc )  - Assess/evaluate cause of self-care deficits   - Assess range of motion  - Assess patient's mobility; develop plan if impaired  - Assess patient's need for assistive devices and provide as appropriate  - Encourage maximum independence but intervene and supervise when necessary  - Involve family in performance of ADLs  - Assess for home care needs following discharge   - Consider OT consult to assist with ADL evaluation and planning for discharge  - Provide patient education as appropriate  Outcome: Progressing  Goal: Maintains/Returns to pre admission functional level  Description: INTERVENTIONS:  - Perform BMAT or MOVE assessment daily    - Set and communicate daily mobility goal to care team and patient/family/caregiver     - Collaborate with rehabilitation services on mobility goals if consulted  - Out of bed for toileting  - Record patient progress and toleration of activity level   Outcome: Progressing     Problem: Prexisting or High Potential for Compromised Skin Integrity  Goal: Skin integrity is maintained or improved  Description: INTERVENTIONS:  - Identify patients at risk for skin breakdown  - Assess and monitor skin integrity  - Assess and monitor nutrition and hydration status  - Monitor labs   - Assess for incontinence   - Turn and reposition patient  - Assist with mobility/ambulation  - Relieve pressure over bony prominences  - Avoid friction and shearing  - Provide appropriate hygiene as needed including keeping skin clean and dry  - Evaluate need for skin moisturizer/barrier cream  - Collaborate with interdisciplinary team   - Patient/family teaching  - Consider wound care consult   Outcome: Progressing     Problem: Potential for Falls  Goal: Patient will remain free of falls  Description: INTERVENTIONS:  - Educate patient/family on patient safety including physical limitations  - Instruct patient to call for assistance with activity   - Consult OT/PT to assist with strengthening/mobility   - Keep Call bell within reach  - Keep bed low and locked with side rails adjusted as appropriate  - Keep care items and personal belongings within reach  - Initiate and maintain comfort rounds  - Make Fall Risk Sign visible to staff  - Offer Toileting every 2 Hours, in advance of need  - Initiate/Maintain alarm  - Obtain necessary fall risk management equipment:   - Apply yellow socks and bracelet for high fall risk patients  - Consider moving patient to room near nurses station  Outcome: Progressing     Problem: Nutrition/Hydration-ADULT  Goal: Nutrient/Hydration intake appropriate for improving, restoring or maintaining nutritional needs  Description: Monitor and assess patient's nutrition/hydration status for malnutrition  Collaborate with interdisciplinary team and initiate plan and interventions as ordered  Monitor patient's weight and dietary intake as ordered or per policy  Utilize nutrition screening tool and intervene as necessary  Determine patient's food preferences and provide high-protein, high-caloric foods as appropriate       INTERVENTIONS:  - Monitor oral intake, urinary output, labs, and treatment plans  - Assess nutrition and hydration status and recommend course of action  - Evaluate amount of meals eaten  - Assist patient with eating if necessary   - Allow adequate time for meals  - Recommend/ encourage appropriate diets, oral nutritional supplements, and vitamin/mineral supplements  - Order, calculate, and assess calorie counts as needed  - Recommend, monitor, and adjust tube feedings and TPN/PPN based on assessed needs  - Assess need for intravenous fluids  - Provide specific nutrition/hydration education as appropriate  - Include patient/family/caregiver in decisions related to nutrition  Outcome: Progressing     Problem: METABOLIC, FLUID AND ELECTROLYTES - ADULT  Goal: Electrolytes maintained within normal limits  Description: INTERVENTIONS:  - Monitor labs and assess patient for signs and symptoms of electrolyte imbalances  - Administer electrolyte replacement as ordered  - Monitor response to electrolyte replacements, including repeat lab results as appropriate  - Instruct patient on fluid and nutrition as appropriate  Outcome: Progressing  Goal: Fluid balance maintained  Description: INTERVENTIONS:  - Monitor labs   - Monitor I/O and WT  - Instruct patient on fluid and nutrition as appropriate  - Assess for signs & symptoms of volume excess or deficit  Outcome: Progressing     Problem: SKIN/TISSUE INTEGRITY - ADULT  Goal: Skin Integrity remains intact(Skin Breakdown Prevention)  Description: Assess:  -Perform Matt assessment every   -Clean and moisturize skin every   -Inspect skin when repositioning, toileting, and assisting with ADLS  -Assess under medical devices such as  every   -Assess extremities for adequate circulation and sensation     Bed Management:  -Have minimal linens on bed & keep smooth, unwrinkled  -Change linens as needed when moist or perspiring  -Avoid sitting or lying in one position for more than  hours while in bed  -Keep HOB at degrees     Toileting:  -Offer bedside commode  -Assess for incontinence every   -Use incontinent care products after each incontinent episode such as     Activity:  -Mobilize patient  times a day  -Encourage activity and walks on unit  -Encourage or provide ROM exercises   -Turn and reposition patient every  Hours  -Use appropriate equipment to lift or move patient in bed  -Instruct/ Assist with weight shifting every  when out of bed in chair  -Consider limitation of chair time  hour intervals    Skin Care:  -Avoid use of baby powder, tape, friction and shearing, hot water or constrictive clothing  -Relieve pressure over bony prominences using   -Do not massage red bony areas    Next Steps:  -Teach patient strategies to minimize risks such as    -Consider consults to  interdisciplinary teams such as   Outcome: Progressing  Goal: Incision(s), wounds(s) or drain site(s) healing without S/S of infection  Description: INTERVENTIONS  - Assess and document dressing, incision, wound bed, drain sites and surrounding tissue  - Provide patient and family education  - Perform skin care/dressing changes every  Outcome: Progressing     Problem: HEMATOLOGIC - ADULT  Goal: Maintains hematologic stability  Description: INTERVENTIONS  - Assess for signs and symptoms of bleeding or hemorrhage  - Monitor labs  - Administer supportive blood products/factors as ordered and appropriate  Outcome: Progressing     Problem: MUSCULOSKELETAL - ADULT  Goal: Maintain or return mobility to safest level of function  Description: INTERVENTIONS:  - Assess patient's ability to carry out ADLs; assess patient's baseline for ADL function and identify physical deficits which impact ability to perform ADLs (bathing, care of mouth/teeth, toileting, grooming, dressing, etc )  - Assess/evaluate cause of self-care deficits   - Assess range of motion  - Assess patient's mobility  - Assess patient's need for assistive devices and provide as appropriate  - Encourage maximum independence but intervene and supervise when necessary  - Involve family in performance of ADLs  - Assess for home care needs following discharge   - Consider OT consult to assist with ADL evaluation and planning for discharge  - Provide patient education as appropriate  Outcome: Progressing

## 2022-05-15 NOTE — PLAN OF CARE
Problem: PAIN - ADULT  Goal: Verbalizes/displays adequate comfort level or baseline comfort level  Description: Interventions:  - Encourage patient to monitor pain and request assistance  - Assess pain using appropriate pain scale  - Administer analgesics based on type and severity of pain and evaluate response  - Implement non-pharmacological measures as appropriate and evaluate response  - Consider cultural and social influences on pain and pain management  - Notify physician/advanced practitioner if interventions unsuccessful or patient reports new pain  Outcome: Progressing     Problem: INFECTION - ADULT  Goal: Absence or prevention of progression during hospitalization  Description: INTERVENTIONS:  - Assess and monitor for signs and symptoms of infection  - Monitor lab/diagnostic results  - Monitor all insertion sites, i e  indwelling lines, tubes, and drains  - Monitor endotracheal if appropriate and nasal secretions for changes in amount and color  - [place] appropriate cooling/warming therapies per order  - Administer medications as ordered  - Instruct and encourage patient and family to use good hand hygiene technique  - Identify and instruct in appropriate isolation precautions for identified infection/condition  Outcome: Progressing     Problem: SAFETY ADULT  Goal: Patient will remain free of falls  Description: INTERVENTIONS:  - Educate patient/family on patient safety including physical limitations  - Instruct patient to call for assistance with activity   - Consult OT/PT to assist with strengthening/mobility   - Keep Call bell within reach  - Keep bed low and locked with side rails adjusted as appropriate  - Keep care items and personal belongings within reach  - Initiate and maintain comfort rounds  - Make Fall Risk Sign visible to staff  - Offer Toileting every 2 Hours, in advance of need  - Initiate/Maintain alarm  - Obtain necessary fall risk management equipment:   - Apply yellow socks and bracelet for high fall risk patients  - Consider moving patient to room near nurses station  Outcome: Progressing  Goal: Maintain or return to baseline ADL function  Description: INTERVENTIONS:  -  Assess patient's ability to carry out ADLs; assess patient's baseline for ADL function and identify physical deficits which impact ability to perform ADLs (bathing, care of mouth/teeth, toileting, grooming, dressing, etc )  - Assess/evaluate cause of self-care deficits   - Assess range of motion  - Assess patient's mobility; develop plan if impaired  - Assess patient's need for assistive devices and provide as appropriate  - Encourage maximum independence but intervene and supervise when necessary  - Involve family in performance of ADLs  - Assess for home care needs following discharge   - Consider OT consult to assist with ADL evaluation and planning for discharge  - Provide patient education as appropriate  Outcome: Progressing  Goal: Maintains/Returns to pre admission functional level  Description: INTERVENTIONS:  - Perform BMAT or MOVE assessment daily    - Set and communicate daily mobility goal to care team and patient/family/caregiver     - Collaborate with rehabilitation services on mobility goals if consulted  - Out of bed for toileting  - Record patient progress and toleration of activity level   Outcome: Progressing     Problem: DISCHARGE PLANNING  Goal: Discharge to home or other facility with appropriate resources  Description: INTERVENTIONS:  - Identify barriers to discharge w/patient and caregiver  - Arrange for needed discharge resources and transportation as appropriate  - Identify discharge learning needs (meds, wound care, etc )  - Arrange for interpretive services to assist at discharge as needed  - Refer to Case Management Department for coordinating discharge planning if the patient needs post-hospital services based on physician/advanced practitioner order or complex needs related to functional status, cognitive ability, or social support system  Outcome: Progressing     Problem: Knowledge Deficit  Goal: Patient/family/caregiver demonstrates understanding of disease process, treatment plan, medications, and discharge instructions  Description: Complete learning assessment and assess knowledge base  Interventions:  - Provide teaching at level of understanding  - Provide teaching via preferred learning methods  Outcome: Progressing     Problem: MOBILITY - ADULT  Goal: Maintain or return to baseline ADL function  Description: INTERVENTIONS:  -  Assess patient's ability to carry out ADLs; assess patient's baseline for ADL function and identify physical deficits which impact ability to perform ADLs (bathing, care of mouth/teeth, toileting, grooming, dressing, etc )  - Assess/evaluate cause of self-care deficits   - Assess range of motion  - Assess patient's mobility; develop plan if impaired  - Assess patient's need for assistive devices and provide as appropriate  - Encourage maximum independence but intervene and supervise when necessary  - Involve family in performance of ADLs  - Assess for home care needs following discharge   - Consider OT consult to assist with ADL evaluation and planning for discharge  - Provide patient education as appropriate  Outcome: Progressing  Goal: Maintains/Returns to pre admission functional level  Description: INTERVENTIONS:  - Perform BMAT or MOVE assessment daily    - Set and communicate daily mobility goal to care team and patient/family/caregiver     - Collaborate with rehabilitation services on mobility goals if consulted  - Out of bed for toileting  - Record patient progress and toleration of activity level   Outcome: Progressing     Problem: Prexisting or High Potential for Compromised Skin Integrity  Goal: Skin integrity is maintained or improved  Description: INTERVENTIONS:  - Identify patients at risk for skin breakdown  - Assess and monitor skin integrity  - Assess and monitor nutrition and hydration status  - Monitor labs   - Assess for incontinence   - Turn and reposition patient  - Assist with mobility/ambulation  - Relieve pressure over bony prominences  - Avoid friction and shearing  - Provide appropriate hygiene as needed including keeping skin clean and dry  - Evaluate need for skin moisturizer/barrier cream  - Collaborate with interdisciplinary team   - Patient/family teaching  - Consider wound care consult   Outcome: Progressing     Problem: Potential for Falls  Goal: Patient will remain free of falls  Description: INTERVENTIONS:  - Educate patient/family on patient safety including physical limitations  - Instruct patient to call for assistance with activity   - Consult OT/PT to assist with strengthening/mobility   - Keep Call bell within reach  - Keep bed low and locked with side rails adjusted as appropriate  - Keep care items and personal belongings within reach  - Initiate and maintain comfort rounds  - Make Fall Risk Sign visible to staff  - Offer Toileting every 2 Hours, in advance of need  - Initiate/Maintain alarm  - Obtain necessary fall risk management equipment:   - Apply yellow socks and bracelet for high fall risk patients  - Consider moving patient to room near nurses station  Outcome: Progressing     Problem: Nutrition/Hydration-ADULT  Goal: Nutrient/Hydration intake appropriate for improving, restoring or maintaining nutritional needs  Description: Monitor and assess patient's nutrition/hydration status for malnutrition  Collaborate with interdisciplinary team and initiate plan and interventions as ordered  Monitor patient's weight and dietary intake as ordered or per policy  Utilize nutrition screening tool and intervene as necessary  Determine patient's food preferences and provide high-protein, high-caloric foods as appropriate       INTERVENTIONS:  - Monitor oral intake, urinary output, labs, and treatment plans  - Assess nutrition and hydration status and recommend course of action  - Evaluate amount of meals eaten  - Assist patient with eating if necessary   - Allow adequate time for meals  - Recommend/ encourage appropriate diets, oral nutritional supplements, and vitamin/mineral supplements  - Order, calculate, and assess calorie counts as needed  - Recommend, monitor, and adjust tube feedings and TPN/PPN based on assessed needs  - Assess need for intravenous fluids  - Provide specific nutrition/hydration education as appropriate  - Include patient/family/caregiver in decisions related to nutrition  Outcome: Progressing     Problem: METABOLIC, FLUID AND ELECTROLYTES - ADULT  Goal: Electrolytes maintained within normal limits  Description: INTERVENTIONS:  - Monitor labs and assess patient for signs and symptoms of electrolyte imbalances  - Administer electrolyte replacement as ordered  - Monitor response to electrolyte replacements, including repeat lab results as appropriate  - Instruct patient on fluid and nutrition as appropriate  Outcome: Progressing  Goal: Fluid balance maintained  Description: INTERVENTIONS:  - Monitor labs   - Monitor I/O and WT  - Instruct patient on fluid and nutrition as appropriate  - Assess for signs & symptoms of volume excess or deficit  Outcome: Progressing     Problem: SKIN/TISSUE INTEGRITY - ADULT  Goal: Skin Integrity remains intact(Skin Breakdown Prevention)  Description: Assess:  -Perform Matt assessment every shift  -Clean and moisturize skin every shift  -Inspect skin when repositioning, toileting, and assisting with ADLS  -Assess under medical devices such as SCD every shift  -Assess extremities for adequate circulation and sensation     Bed Management:  -Have minimal linens on bed & keep smooth, unwrinkled  -Change linens as needed when moist or perspiring  -Avoid sitting or lying in one position for more than 4 hours while in bed  -Keep HOB at 45 degrees     Toileting:  -Offer bedside commode  -Assess for incontinence every shift      Activity:  -Mobilize patient 2 times a day  -Encourage activity and walks on unit  -Encourage or provide ROM exercises   -Turn and reposition patient every 2 Hours  -Use appropriate equipment to lift or move patient in bed  -Instruct/ Assist with weight shifting every shift when out of bed in chair  -Consider limitation of chair time 2 hour intervals    Skin Care:  -Avoid use of baby powder, tape, friction and shearing, hot water or constrictive clothing  -Relieve pressure over bony prominences using pillows  -Do not massage red bony areas      Outcome: Progressing  Goal: Incision(s), wounds(s) or drain site(s) healing without S/S of infection  Description: INTERVENTIONS  - Assess and document dressing, incision, wound bed, drain sites and surrounding tissue  - Provide patient and family education  - Perform skin care/dressing changes every shift  Outcome: Progressing     Problem: HEMATOLOGIC - ADULT  Goal: Maintains hematologic stability  Description: INTERVENTIONS  - Assess for signs and symptoms of bleeding or hemorrhage  - Monitor labs  - Administer supportive blood products/factors as ordered and appropriate  Outcome: Progressing     Problem: MUSCULOSKELETAL - ADULT  Goal: Maintain or return mobility to safest level of function  Description: INTERVENTIONS:  - Assess patient's ability to carry out ADLs; assess patient's baseline for ADL function and identify physical deficits which impact ability to perform ADLs (bathing, care of mouth/teeth, toileting, grooming, dressing, etc )  - Assess/evaluate cause of self-care deficits   - Assess range of motion  - Assess patient's mobility  - Assess patient's need for assistive devices and provide as appropriate  - Encourage maximum independence but intervene and supervise when necessary  - Involve family in performance of ADLs  - Assess for home care needs following discharge   - Consider OT consult to assist with ADL evaluation and planning for discharge  - Provide patient education as appropriate  Outcome: Progressing

## 2022-05-15 NOTE — NURSING NOTE
This Rn called lab to inquire about the supplies needed to collect the blood specimen for the anti PLA2R - anti phospholipase A 2 receptor Ab  Stilling awaiting call back from lab with more information

## 2022-05-15 NOTE — ASSESSMENT & PLAN NOTE
· S/p cervical fusion performed by Dr Rhiannon Rae on 0/12/96, complicated by MRSA infection  · Completed 24 days of IV vancomycin, transitioned to daptomycin on 04/24-4/29    Discharged home on doxycycline 100 mg b i d    · Continue doxycycline  · apprec ID, not felt to be contributing to PIGN

## 2022-05-15 NOTE — PROGRESS NOTES
[address] [facility] NOTE   Gilberto Jennings 71 y o  male MRN: [id number]  Unit/Bed#: -01 Encounter: [id number]  Reason for Consult: JANEL    ASSESSMENT and PLAN:    60-year-old male with a past medical history of CKD, factor 5 leiden deficiency, DVT, chronic diastolic heart failure who underwent cervical fusion March 11 complicated by MRSA surgical site infection and osteomyelitis and also acute kidney injury in April  Now presenting with nausea and worsening renal function  Nephrology on board for acute kidney injury     1) acute kidney injury     -baseline creatinine less than 1 mg/dL  -outpatient nephrologist is Dr Bandar Bartholomew  -acute kidney injury in April possibly secondary to ATN versus post infectious GN  -creatinine plateau to high 3 on discharge  -creatinine now 5 mg/dL initially  -renal ultrasound-no hydronephrosis and no echogenicity  -serological workup-hepatitis panel, complements, anti double stranded DNA, ZAK, anti GBM, Anca titer, SPEP, UPEP, hemolysis smear were unrevealing  -renal biopsy discussions-reviewed note from nephrologist yesterday and patient agrees today also, that the risk of anticoagulation/low hemoglobin/prior negative serologies, the biopsy may be low yield and high risk  And therefore the patient is currently holding on biopsy  -initially received intravenous Lasix  -patient received Lasix and albumin on 05/13  -creatinine on 05/14 is stable 5 3  Held Lasix  -5/15-creatinine slowly improving 5 2  Potassium low 3  3      Plan  -I/O; avoid nephrotoxic agents  -BMP in a m   -avoid hypotension  -may ultimately need to proceed to renal biopsy but will need to correct anemia and monitor blood pressures closely  Currently patient is deferring biopsy for now  -check anti HUGH 2R completeness  -give potassium chloride 40 mEq today    Ordered  -hold Lasix today  -would monitor in-patient today from the renal standpoint     2) nephrotic range proteinuria     -18 g U PCR and repeat 16 5 g and repeat improving to 12 4 g  -serological workup unrevealing as above     3) acid/base-stable bicarbonate     4) electrolytes     -hypokalemia-on potassium chloride-  -given potassium chloride 5/15     5) microscopic hematuria-possibly in setting of post infectious GN     -was seen by Urology  -negative serologies as above     6) volume overload-     -received intravenous Lasix during this admission  -hold Lasix 5/14     7) history of coagulopathic disorder-per primary team     8) anemia-iron level-iron saturation 20     9) surgical site infection-per primary team    SUBJECTIVE / 24H INTERVAL HISTORY:    Blood pressure is labile [phone] systolic  Patient denies complaints  No shortness of breath  No pain  Weight on standing scale is unchanged at 106 kg      OBJECTIVE:  Current Weight: Weight - Scale: 106 kg (232 lb 11 2 oz)  Vitals:    05/14/22 2145 05/14/22 2322 05/15/22 0527 05/15/22 0754   BP: (!) 180/91 138/66  164/80   Pulse: 56 55  55   Resp:    18   Temp: 97 5 °F (36 4 °C)   97 5 °F (36 4 °C)   TempSrc:       SpO2: 98% 95%  95%   Weight:   106 kg (232 lb 11 2 oz)        Intake/Output Summary (Last 24 hours) at 5/15/2022 0851  Last data filed at 5/15/2022 0801  Gross per 24 hour   Intake 660 ml   Output 2675 ml   Net -2015 ml     General: NAD  Skin: no rash  Eyes: anicteric sclera  ENT: moist mucous membrane, neck brace  Neck: supple  Chest: CTA b/l, no ronchii, no wheeze, no rubs, no rales  CVS: s1s2, no murmur, no gallop, no rub  Abdomen: soft, nontender, nl sounds  Extremities:  Trace to 1+ pitting edema LE b/l  : no thompson  Neuro: AAOX3  Psych: normal affect    Medications:    Current Facility-Administered Medications:     acetaminophen (TYLENOL) tablet 650 mg, 650 mg, Oral, Q6H PRN, Monalisa Felty, PA-C, 650 mg at 05/14/22 2139    amLODIPine (NORVASC) tablet 5 mg, 5 mg, Oral, Daily, Thu Loya MD, 5 mg at 05/15/22 0820    docusate sodium (COLACE) capsule 100 mg, 100 mg, Oral, Daily, Particjacob Larios PA-C, 100 mg at 05/15/22 0820    doxycycline hyclate (VIBRAMYCIN) capsule 100 mg, 100 mg, Oral, Q12H [address], Jonelle Lou PA-C, 100 mg at 05/15/22 0820    DULoxetine (CYMBALTA) delayed release capsule 60 mg, 60 mg, Oral, Daily, Jonelle Lou PA-C, 60 mg at 05/15/22 0820    gabapentin (NEURONTIN) capsule 100 mg, 100 mg, Oral, Daily, Jonelle Lou PA-C, 100 mg at 05/15/22 0820    gabapentin (NEURONTIN) capsule 300 mg, 300 mg, Oral, HS, Jonelle Lou PA-C, 300 mg at 05/14/22 2134    lidocaine (LIDODERM) 5 % patch 1 patch, 1 patch, Topical, Daily PRN, Annette Flores PA-C, 1 patch at 05/15/22 0549    methocarbamol (ROBAXIN) tablet 750 mg, 750 mg, Oral, Q6H PRN, Kiki Larios PA-C, 750 mg at 05/13/22 2119    metoprolol succinate (TOPROL-XL) 24 hr tablet 100 mg, 100 mg, Oral, Daily, Jonelle Lou PA-C, 100 mg at 05/15/22 0820    ondansetron (ZOFRAN) injection 4 mg, 4 mg, Intravenous, Q4H PRN, Annette Flores PA-C, 4 mg at 05/12/22 2145    pantoprazole (PROTONIX) EC tablet 40 mg, 40 mg, Oral, BID AC, Jonelle Lou PA-C, 40 mg at 05/15/22 0518    potassium chloride (K-DUR,KLOR-CON) CR tablet 40 mEq, 40 mEq, Oral, Once, Tammi Kerr MD    pravastatin (PRAVACHOL) tablet 40 mg, 40 mg, Oral, Daily With Dinner, Kiki Larios PA-C, 40 mg at 05/14/22 1714    tamsulosin (FLOMAX) capsule 0 4 mg, 0 4 mg, Oral, Daily With Dinner, Kiki Larios PA-C, 0 4 mg at 05/14/22 1714    warfarin (COUMADIN) tablet 5 mg, 5 mg, Oral, Daily (warfarin), Molly Orellana MD, 5 mg at 05/14/22 1714    Laboratory Results:  Results from last 7 days   Lab Units 05/15/22  0512 05/14/22  0520 05/13/22  0451 05/12/22  0349 05/11/22  1338 05/11/22  0334 05/10/22  1950 05/10/22  1949   WBC Thousand/uL 5 92 6 35 6 79 6 34  --  5 31  --  6 08   HEMOGLOBIN g/dL 8 0* 7 6* 7 7* 7 6*  --  7 1*  --  7 9*   HEMATOCRIT % 25 4* 24 3* 23 9* 23 8*  --  22 3*  --  24 8*   PLATELETS Thousands/uL 195 195 206 197  --  192  --  237   POTASSIUM mmol/L 3 3* 3 4* 3 3* 3  4* 3 7 3 2* 2 8*  --    CHLORIDE mmol/L 102 103 103 104 103 102 100  --    CO2 mmol/L 27 28 27 28 27 26 27  --    BUN mg/dL 48* 47* 42* 39* 36* 35* 35*  --    CREATININE mg/dL 5 21* 5 39* 5 50* 5 11* 4 99* 5 00* 5 04*  --    CALCIUM mg/dL 8 5 8 6 8 7 8 1* 7 9* 8 1* 7 9*  --    MAGNESIUM mg/dL  --   --   --   --   --   --  1 4*  --    PHOSPHORUS mg/dL  --   --   --   --   --   --  5 4*  --

## 2022-05-15 NOTE — ASSESSMENT & PLAN NOTE
· Creatinine on admission: 5 04  · Direct admit due to rising creatinine outpatient  · Bilateral lower extremity 2+ pitting edema  · Prior admission: admitted on 04/22,  Discharged on 04/29 with creatinine 3 85  · Believed to be due to volume overload, postinfectious GN, Staph aureus bacteremia  · Renal biopsy was not performed  Patient and family are not interested in bx as felt to be invasive per their discussion with me  Felt to be lower yield per nephrology correspondence  · Baseline prior to this event was 0 6-0 9  · Fluid restriction, monitor I/O  · Avoid nephrotoxins, hypotension, IV contrast  · Apprec nephrology  No clear indications for emergency HD at this time  · Started on diuresis with good output  Cr approximately stable to slight improvement  Completed diuresis  Unclear to what degree renal function will recover

## 2022-05-16 ENCOUNTER — TELEPHONE (OUTPATIENT)
Dept: NEUROSURGERY | Facility: CLINIC | Age: 72
End: 2022-05-16

## 2022-05-16 LAB
ANION GAP SERPL CALCULATED.3IONS-SCNC: 9 MMOL/L (ref 4–13)
BACTERIA BLD CULT: NORMAL
BACTERIA BLD CULT: NORMAL
BASOPHILS # BLD AUTO: 0.06 THOUSANDS/ΜL (ref 0–0.1)
BASOPHILS NFR BLD AUTO: 1 % (ref 0–1)
BUN SERPL-MCNC: 46 MG/DL (ref 5–25)
CALCIUM SERPL-MCNC: 8.8 MG/DL (ref 8.3–10.1)
CHLORIDE SERPL-SCNC: 104 MMOL/L (ref 100–108)
CO2 SERPL-SCNC: 27 MMOL/L (ref 21–32)
CREAT SERPL-MCNC: 4.98 MG/DL (ref 0.6–1.3)
EOSINOPHIL # BLD AUTO: 0.3 THOUSAND/ΜL (ref 0–0.61)
EOSINOPHIL NFR BLD AUTO: 5 % (ref 0–6)
ERYTHROCYTE [DISTWIDTH] IN BLOOD BY AUTOMATED COUNT: 14.5 % (ref 11.6–15.1)
GFR SERPL CREATININE-BSD FRML MDRD: 10 ML/MIN/1.73SQ M
GLUCOSE SERPL-MCNC: 96 MG/DL (ref 65–140)
HCT VFR BLD AUTO: 27.4 % (ref 36.5–49.3)
HGB BLD-MCNC: 8.6 G/DL (ref 12–17)
IMM GRANULOCYTES # BLD AUTO: 0.01 THOUSAND/UL (ref 0–0.2)
IMM GRANULOCYTES NFR BLD AUTO: 0 % (ref 0–2)
INR PPP: 2.68 (ref 0.84–1.19)
LYMPHOCYTES # BLD AUTO: 1.87 THOUSANDS/ΜL (ref 0.6–4.47)
LYMPHOCYTES NFR BLD AUTO: 31 % (ref 14–44)
MCH RBC QN AUTO: 28.8 PG (ref 26.8–34.3)
MCHC RBC AUTO-ENTMCNC: 31.4 G/DL (ref 31.4–37.4)
MCV RBC AUTO: 92 FL (ref 82–98)
MONOCYTES # BLD AUTO: 0.6 THOUSAND/ΜL (ref 0.17–1.22)
MONOCYTES NFR BLD AUTO: 10 % (ref 4–12)
NEUTROPHILS # BLD AUTO: 3.24 THOUSANDS/ΜL (ref 1.85–7.62)
NEUTS SEG NFR BLD AUTO: 53 % (ref 43–75)
NRBC BLD AUTO-RTO: 0 /100 WBCS
PLATELET # BLD AUTO: 187 THOUSANDS/UL (ref 149–390)
PMV BLD AUTO: 10.3 FL (ref 8.9–12.7)
POTASSIUM SERPL-SCNC: 3.4 MMOL/L (ref 3.5–5.3)
PROTHROMBIN TIME: 27.8 SECONDS (ref 11.6–14.5)
RBC # BLD AUTO: 2.99 MILLION/UL (ref 3.88–5.62)
SODIUM SERPL-SCNC: 140 MMOL/L (ref 136–145)
WBC # BLD AUTO: 6.08 THOUSAND/UL (ref 4.31–10.16)

## 2022-05-16 PROCEDURE — 85610 PROTHROMBIN TIME: CPT | Performed by: HOSPITALIST

## 2022-05-16 PROCEDURE — 99232 SBSQ HOSP IP/OBS MODERATE 35: CPT | Performed by: INTERNAL MEDICINE

## 2022-05-16 PROCEDURE — 80048 BASIC METABOLIC PNL TOTAL CA: CPT | Performed by: INTERNAL MEDICINE

## 2022-05-16 PROCEDURE — 85025 COMPLETE CBC W/AUTO DIFF WBC: CPT | Performed by: HOSPITALIST

## 2022-05-16 RX ORDER — POTASSIUM CHLORIDE 20 MEQ/1
40 TABLET, EXTENDED RELEASE ORAL ONCE
Status: COMPLETED | OUTPATIENT
Start: 2022-05-16 | End: 2022-05-16

## 2022-05-16 RX ADMIN — DOXYCYCLINE 100 MG: 100 CAPSULE ORAL at 08:29

## 2022-05-16 RX ADMIN — AMLODIPINE BESYLATE 5 MG: 5 TABLET ORAL at 08:29

## 2022-05-16 RX ADMIN — TAMSULOSIN HYDROCHLORIDE 0.4 MG: 0.4 CAPSULE ORAL at 16:36

## 2022-05-16 RX ADMIN — PANTOPRAZOLE SODIUM 40 MG: 40 TABLET, DELAYED RELEASE ORAL at 05:00

## 2022-05-16 RX ADMIN — GABAPENTIN 300 MG: 300 CAPSULE ORAL at 22:25

## 2022-05-16 RX ADMIN — DOCUSATE SODIUM 100 MG: 100 CAPSULE, LIQUID FILLED ORAL at 08:29

## 2022-05-16 RX ADMIN — WARFARIN SODIUM 5 MG: 5 TABLET ORAL at 18:44

## 2022-05-16 RX ADMIN — DULOXETINE 60 MG: 30 CAPSULE, DELAYED RELEASE ORAL at 08:29

## 2022-05-16 RX ADMIN — DOXYCYCLINE 100 MG: 100 CAPSULE ORAL at 22:25

## 2022-05-16 RX ADMIN — METOPROLOL SUCCINATE 100 MG: 50 TABLET, EXTENDED RELEASE ORAL at 08:29

## 2022-05-16 RX ADMIN — POTASSIUM CHLORIDE 40 MEQ: 1500 TABLET, EXTENDED RELEASE ORAL at 10:41

## 2022-05-16 RX ADMIN — GABAPENTIN 100 MG: 100 CAPSULE ORAL at 08:29

## 2022-05-16 RX ADMIN — PANTOPRAZOLE SODIUM 40 MG: 40 TABLET, DELAYED RELEASE ORAL at 16:36

## 2022-05-16 RX ADMIN — PRAVASTATIN SODIUM 40 MG: 40 TABLET ORAL at 16:36

## 2022-05-16 NOTE — PLAN OF CARE
Problem: PAIN - ADULT  Goal: Verbalizes/displays adequate comfort level or baseline comfort level  Description: Interventions:  - Encourage patient to monitor pain and request assistance  - Assess pain using appropriate pain scale  - Administer analgesics based on type and severity of pain and evaluate response  - Implement non-pharmacological measures as appropriate and evaluate response  - Consider cultural and social influences on pain and pain management  - Notify physician/advanced practitioner if interventions unsuccessful or patient reports new pain  Outcome: Progressing     Problem: INFECTION - ADULT  Goal: Absence or prevention of progression during hospitalization  Description: INTERVENTIONS:  - Assess and monitor for signs and symptoms of infection  - Monitor lab/diagnostic results  - Monitor all insertion sites, i e  indwelling lines, tubes, and drains  - Monitor endotracheal if appropriate and nasal secretions for changes in amount and color  - [place] appropriate cooling/warming therapies per order  - Administer medications as ordered  - Instruct and encourage patient and family to use good hand hygiene technique  - Identify and instruct in appropriate isolation precautions for identified infection/condition  Outcome: Progressing     Problem: SAFETY ADULT  Goal: Patient will remain free of falls  Description: INTERVENTIONS:  - Educate patient/family on patient safety including physical limitations  - Instruct patient to call for assistance with activity   - Consult OT/PT to assist with strengthening/mobility   - Keep Call bell within reach  - Keep bed low and locked with side rails adjusted as appropriate  - Keep care items and personal belongings within reach  - Initiate and maintain comfort rounds  - Make Fall Risk Sign visible to staff  - Offer Toileting every 2 Hours, in advance of need  - Initiate/Maintain alarm  - Obtain necessary fall risk management equipment:   - Apply yellow socks and bracelet for high fall risk patients  - Consider moving patient to room near nurses station  Outcome: Progressing  Goal: Maintain or return to baseline ADL function  Description: INTERVENTIONS:  -  Assess patient's ability to carry out ADLs; assess patient's baseline for ADL function and identify physical deficits which impact ability to perform ADLs (bathing, care of mouth/teeth, toileting, grooming, dressing, etc )  - Assess/evaluate cause of self-care deficits   - Assess range of motion  - Assess patient's mobility; develop plan if impaired  - Assess patient's need for assistive devices and provide as appropriate  - Encourage maximum independence but intervene and supervise when necessary  - Involve family in performance of ADLs  - Assess for home care needs following discharge   - Consider OT consult to assist with ADL evaluation and planning for discharge  - Provide patient education as appropriate  Outcome: Progressing  Goal: Maintains/Returns to pre admission functional level  Description: INTERVENTIONS:  - Perform BMAT or MOVE assessment daily    - Set and communicate daily mobility goal to care team and patient/family/caregiver     - Collaborate with rehabilitation services on mobility goals if consulted  - Out of bed for toileting  - Record patient progress and toleration of activity level   Outcome: Progressing     Problem: DISCHARGE PLANNING  Goal: Discharge to home or other facility with appropriate resources  Description: INTERVENTIONS:  - Identify barriers to discharge w/patient and caregiver  - Arrange for needed discharge resources and transportation as appropriate  - Identify discharge learning needs (meds, wound care, etc )  - Arrange for interpretive services to assist at discharge as needed  - Refer to Case Management Department for coordinating discharge planning if the patient needs post-hospital services based on physician/advanced practitioner order or complex needs related to functional status, cognitive ability, or social support system  Outcome: Progressing     Problem: Knowledge Deficit  Goal: Patient/family/caregiver demonstrates understanding of disease process, treatment plan, medications, and discharge instructions  Description: Complete learning assessment and assess knowledge base  Interventions:  - Provide teaching at level of understanding  - Provide teaching via preferred learning methods  Outcome: Progressing     Problem: MOBILITY - ADULT  Goal: Maintain or return to baseline ADL function  Description: INTERVENTIONS:  -  Assess patient's ability to carry out ADLs; assess patient's baseline for ADL function and identify physical deficits which impact ability to perform ADLs (bathing, care of mouth/teeth, toileting, grooming, dressing, etc )  - Assess/evaluate cause of self-care deficits   - Assess range of motion  - Assess patient's mobility; develop plan if impaired  - Assess patient's need for assistive devices and provide as appropriate  - Encourage maximum independence but intervene and supervise when necessary  - Involve family in performance of ADLs  - Assess for home care needs following discharge   - Consider OT consult to assist with ADL evaluation and planning for discharge  - Provide patient education as appropriate  Outcome: Progressing  Goal: Maintains/Returns to pre admission functional level  Description: INTERVENTIONS:  - Perform BMAT or MOVE assessment daily    - Set and communicate daily mobility goal to care team and patient/family/caregiver     - Collaborate with rehabilitation services on mobility goals if consulted  - Out of bed for toileting  - Record patient progress and toleration of activity level   Outcome: Progressing     Problem: Prexisting or High Potential for Compromised Skin Integrity  Goal: Skin integrity is maintained or improved  Description: INTERVENTIONS:  - Identify patients at risk for skin breakdown  - Assess and monitor skin integrity  - Assess and monitor nutrition and hydration status  - Monitor labs   - Assess for incontinence   - Turn and reposition patient  - Assist with mobility/ambulation  - Relieve pressure over bony prominences  - Avoid friction and shearing  - Provide appropriate hygiene as needed including keeping skin clean and dry  - Evaluate need for skin moisturizer/barrier cream  - Collaborate with interdisciplinary team   - Patient/family teaching  - Consider wound care consult   Outcome: Progressing     Problem: Potential for Falls  Goal: Patient will remain free of falls  Description: INTERVENTIONS:  - Educate patient/family on patient safety including physical limitations  - Instruct patient to call for assistance with activity   - Consult OT/PT to assist with strengthening/mobility   - Keep Call bell within reach  - Keep bed low and locked with side rails adjusted as appropriate  - Keep care items and personal belongings within reach  - Initiate and maintain comfort rounds  - Make Fall Risk Sign visible to staff  - Offer Toileting every 2 Hours, in advance of need  - Initiate/Maintain alarm  - Obtain necessary fall risk management equipment:   - Apply yellow socks and bracelet for high fall risk patients  - Consider moving patient to room near nurses station  Outcome: Progressing     Problem: Nutrition/Hydration-ADULT  Goal: Nutrient/Hydration intake appropriate for improving, restoring or maintaining nutritional needs  Description: Monitor and assess patient's nutrition/hydration status for malnutrition  Collaborate with interdisciplinary team and initiate plan and interventions as ordered  Monitor patient's weight and dietary intake as ordered or per policy  Utilize nutrition screening tool and intervene as necessary  Determine patient's food preferences and provide high-protein, high-caloric foods as appropriate       INTERVENTIONS:  - Monitor oral intake, urinary output, labs, and treatment plans  - Assess nutrition and hydration status and recommend course of action  - Evaluate amount of meals eaten  - Assist patient with eating if necessary   - Allow adequate time for meals  - Recommend/ encourage appropriate diets, oral nutritional supplements, and vitamin/mineral supplements  - Order, calculate, and assess calorie counts as needed  - Recommend, monitor, and adjust tube feedings and TPN/PPN based on assessed needs  - Assess need for intravenous fluids  - Provide specific nutrition/hydration education as appropriate  - Include patient/family/caregiver in decisions related to nutrition  Outcome: Progressing     Problem: METABOLIC, FLUID AND ELECTROLYTES - ADULT  Goal: Electrolytes maintained within normal limits  Description: INTERVENTIONS:  - Monitor labs and assess patient for signs and symptoms of electrolyte imbalances  - Administer electrolyte replacement as ordered  - Monitor response to electrolyte replacements, including repeat lab results as appropriate  - Instruct patient on fluid and nutrition as appropriate  Outcome: Progressing  Goal: Fluid balance maintained  Description: INTERVENTIONS:  - Monitor labs   - Monitor I/O and WT  - Instruct patient on fluid and nutrition as appropriate  - Assess for signs & symptoms of volume excess or deficit  Outcome: Progressing     Problem: SKIN/TISSUE INTEGRITY - ADULT  Goal: Skin Integrity remains intact(Skin Breakdown Prevention)  Description: Assess:  -Perform Matt assessment every shift  -Clean and moisturize skin every shift  -Inspect skin when repositioning, toileting, and assisting with ADLS  -Assess under medical devices such as SCD every shift  -Assess extremities for adequate circulation and sensation     Bed Management:  -Have minimal linens on bed & keep smooth, unwrinkled  -Change linens as needed when moist or perspiring  -Avoid sitting or lying in one position for more than 4 hours while in bed  -Keep HOB at 30degrees         Activity:  -Mobilize patient 5 times a day  -Encourage activity and walks on unit  -Encourage or provide ROM exercises   -Turn and reposition patient every 2 Hours  -Use appropriate equipment to lift or move patient in bed  -Instruct/ Assist with weight shifting every   when out of bed in chair  -Consider limitation of chair time   hour intervals    Skin Care:  -Avoid use of baby powder, tape, friction and shearing, hot water or constrictive clothing  -Relieve pressure over bony prominences using pillows  -Do not massage red bony areas    Outcome: Progressing  Goal: Incision(s), wounds(s) or drain site(s) healing without S/S of infection  Description: INTERVENTIONS  - Assess and document dressing, incision, wound bed, drain sites and surrounding tissue  - Provide patient and family education  - Perform skin care/dressing changes every day  Outcome: Progressing     Problem: HEMATOLOGIC - ADULT  Goal: Maintains hematologic stability  Description: INTERVENTIONS  - Assess for signs and symptoms of bleeding or hemorrhage  - Monitor labs  - Administer supportive blood products/factors as ordered and appropriate  Outcome: Progressing     Problem: MUSCULOSKELETAL - ADULT  Goal: Maintain or return mobility to safest level of function  Description: INTERVENTIONS:  - Assess patient's ability to carry out ADLs; assess patient's baseline for ADL function and identify physical deficits which impact ability to perform ADLs (bathing, care of mouth/teeth, toileting, grooming, dressing, etc )  - Assess/evaluate cause of self-care deficits   - Assess range of motion  - Assess patient's mobility  - Assess patient's need for assistive devices and provide as appropriate  - Encourage maximum independence but intervene and supervise when necessary  - Involve family in performance of ADLs  - Assess for home care needs following discharge   - Consider OT consult to assist with ADL evaluation and planning for discharge  - Provide patient education as appropriate  Outcome: Progressing

## 2022-05-16 NOTE — PLAN OF CARE
Problem: PAIN - ADULT  Goal: Verbalizes/displays adequate comfort level or baseline comfort level  Description: Interventions:  - Encourage patient to monitor pain and request assistance  - Assess pain using appropriate pain scale  - Administer analgesics based on type and severity of pain and evaluate response  - Implement non-pharmacological measures as appropriate and evaluate response  - Consider cultural and social influences on pain and pain management  - Notify physician/advanced practitioner if interventions unsuccessful or patient reports new pain  Outcome: Progressing     Problem: INFECTION - ADULT  Goal: Absence or prevention of progression during hospitalization  Description: INTERVENTIONS:  - Assess and monitor for signs and symptoms of infection  - Monitor lab/diagnostic results  - Monitor all insertion sites, i e  indwelling lines, tubes, and drains  - Monitor endotracheal if appropriate and nasal secretions for changes in amount and color  - [place] appropriate cooling/warming therapies per order  - Administer medications as ordered  - Instruct and encourage patient and family to use good hand hygiene technique  - Identify and instruct in appropriate isolation precautions for identified infection/condition  Outcome: Progressing     Problem: SAFETY ADULT  Goal: Patient will remain free of falls  Description: INTERVENTIONS:  - Educate patient/family on patient safety including physical limitations  - Instruct patient to call for assistance with activity   - Consult OT/PT to assist with strengthening/mobility   - Keep Call bell within reach  - Keep bed low and locked with side rails adjusted as appropriate  - Keep care items and personal belongings within reach  - Initiate and maintain comfort rounds  - Make Fall Risk Sign visible to staff  - Offer Toileting every 2 Hours, in advance of need  - Initiate/Maintain alarm  - Obtain necessary fall risk management equipment:   - Apply yellow socks and bracelet for high fall risk patients  - Consider moving patient to room near nurses station  Outcome: Progressing  Goal: Maintain or return to baseline ADL function  Description: INTERVENTIONS:  -  Assess patient's ability to carry out ADLs; assess patient's baseline for ADL function and identify physical deficits which impact ability to perform ADLs (bathing, care of mouth/teeth, toileting, grooming, dressing, etc )  - Assess/evaluate cause of self-care deficits   - Assess range of motion  - Assess patient's mobility; develop plan if impaired  - Assess patient's need for assistive devices and provide as appropriate  - Encourage maximum independence but intervene and supervise when necessary  - Involve family in performance of ADLs  - Assess for home care needs following discharge   - Consider OT consult to assist with ADL evaluation and planning for discharge  - Provide patient education as appropriate  Outcome: Progressing  Goal: Maintains/Returns to pre admission functional level  Description: INTERVENTIONS:  - Perform BMAT or MOVE assessment daily    - Set and communicate daily mobility goal to care team and patient/family/caregiver     - Collaborate with rehabilitation services on mobility goals if consulted  - Out of bed for toileting  - Record patient progress and toleration of activity level   Outcome: Progressing     Problem: DISCHARGE PLANNING  Goal: Discharge to home or other facility with appropriate resources  Description: INTERVENTIONS:  - Identify barriers to discharge w/patient and caregiver  - Arrange for needed discharge resources and transportation as appropriate  - Identify discharge learning needs (meds, wound care, etc )  - Arrange for interpretive services to assist at discharge as needed  - Refer to Case Management Department for coordinating discharge planning if the patient needs post-hospital services based on physician/advanced practitioner order or complex needs related to functional status, cognitive ability, or social support system  Outcome: Progressing     Problem: Knowledge Deficit  Goal: Patient/family/caregiver demonstrates understanding of disease process, treatment plan, medications, and discharge instructions  Description: Complete learning assessment and assess knowledge base  Interventions:  - Provide teaching at level of understanding  - Provide teaching via preferred learning methods  Outcome: Progressing     Problem: MOBILITY - ADULT  Goal: Maintain or return to baseline ADL function  Description: INTERVENTIONS:  -  Assess patient's ability to carry out ADLs; assess patient's baseline for ADL function and identify physical deficits which impact ability to perform ADLs (bathing, care of mouth/teeth, toileting, grooming, dressing, etc )  - Assess/evaluate cause of self-care deficits   - Assess range of motion  - Assess patient's mobility; develop plan if impaired  - Assess patient's need for assistive devices and provide as appropriate  - Encourage maximum independence but intervene and supervise when necessary  - Involve family in performance of ADLs  - Assess for home care needs following discharge   - Consider OT consult to assist with ADL evaluation and planning for discharge  - Provide patient education as appropriate  Outcome: Progressing  Goal: Maintains/Returns to pre admission functional level  Description: INTERVENTIONS:  - Perform BMAT or MOVE assessment daily    - Set and communicate daily mobility goal to care team and patient/family/caregiver     - Collaborate with rehabilitation services on mobility goals if consulted  - Out of bed for toileting  - Record patient progress and toleration of activity level   Outcome: Progressing     Problem: Prexisting or High Potential for Compromised Skin Integrity  Goal: Skin integrity is maintained or improved  Description: INTERVENTIONS:  - Identify patients at risk for skin breakdown  - Assess and monitor skin integrity  - Assess and monitor nutrition and hydration status  - Monitor labs   - Assess for incontinence   - Turn and reposition patient  - Assist with mobility/ambulation  - Relieve pressure over bony prominences  - Avoid friction and shearing  - Provide appropriate hygiene as needed including keeping skin clean and dry  - Evaluate need for skin moisturizer/barrier cream  - Collaborate with interdisciplinary team   - Patient/family teaching  - Consider wound care consult   Outcome: Progressing     Problem: Potential for Falls  Goal: Patient will remain free of falls  Description: INTERVENTIONS:  - Educate patient/family on patient safety including physical limitations  - Instruct patient to call for assistance with activity   - Consult OT/PT to assist with strengthening/mobility   - Keep Call bell within reach  - Keep bed low and locked with side rails adjusted as appropriate  - Keep care items and personal belongings within reach  - Initiate and maintain comfort rounds  - Make Fall Risk Sign visible to staff  - Offer Toileting every 2 Hours, in advance of need  - Initiate/Maintain alarm  - Obtain necessary fall risk management equipment:   - Apply yellow socks and bracelet for high fall risk patients  - Consider moving patient to room near nurses station  Outcome: Progressing     Problem: Nutrition/Hydration-ADULT  Goal: Nutrient/Hydration intake appropriate for improving, restoring or maintaining nutritional needs  Description: Monitor and assess patient's nutrition/hydration status for malnutrition  Collaborate with interdisciplinary team and initiate plan and interventions as ordered  Monitor patient's weight and dietary intake as ordered or per policy  Utilize nutrition screening tool and intervene as necessary  Determine patient's food preferences and provide high-protein, high-caloric foods as appropriate       INTERVENTIONS:  - Monitor oral intake, urinary output, labs, and treatment plans  - Assess nutrition and hydration status and recommend course of action  - Evaluate amount of meals eaten  - Assist patient with eating if necessary   - Allow adequate time for meals  - Recommend/ encourage appropriate diets, oral nutritional supplements, and vitamin/mineral supplements  - Order, calculate, and assess calorie counts as needed  - Recommend, monitor, and adjust tube feedings and TPN/PPN based on assessed needs  - Assess need for intravenous fluids  - Provide specific nutrition/hydration education as appropriate  - Include patient/family/caregiver in decisions related to nutrition  Outcome: Progressing     Problem: METABOLIC, FLUID AND ELECTROLYTES - ADULT  Goal: Electrolytes maintained within normal limits  Description: INTERVENTIONS:  - Monitor labs and assess patient for signs and symptoms of electrolyte imbalances  - Administer electrolyte replacement as ordered  - Monitor response to electrolyte replacements, including repeat lab results as appropriate  - Instruct patient on fluid and nutrition as appropriate  Outcome: Progressing  Goal: Fluid balance maintained  Description: INTERVENTIONS:  - Monitor labs   - Monitor I/O and WT  - Instruct patient on fluid and nutrition as appropriate  - Assess for signs & symptoms of volume excess or deficit  Outcome: Progressing     Problem: SKIN/TISSUE INTEGRITY - ADULT  Goal: Skin Integrity remains intact(Skin Breakdown Prevention)  Description: Assess:  -Perform Matt assessment every x  -Clean and moisturize skin every x  -Inspect skin when repositioning, toileting, and assisting with ADLS  -Assess under medical devices such as x every x  -Assess extremities for adequate circulation and sensation     Bed Management:  -Have minimal linens on bed & keep smooth, unwrinkled  -Change linens as needed when moist or perspiring  -Avoid sitting or lying in one position for more than x hours while in bed  -Keep HOB at [facility]     Toileting:  -Offer bedside commode  -Assess for incontinence every xxxxx  -Use incontinent care products after each incontinent episode such as x    Activity:  -Mobilize patient x times a day  -Encourage activity and walks on unit  -Encourage or provide ROM exercises   -Turn and reposition patient every x Hours  -Use appropriate equipment to lift or move patient in bed  -Instruct/ Assist with weight shifting every x when out of bed in chair  -Consider limitation of chair time x hour intervals    Skin Care:  -Avoid use of baby powder, tape, friction and shearing, hot water or constrictive clothing  -Relieve pressure over bony prominences using x  -Do not massage red bony areas    Next Steps:  -Teach patient strategies to minimize risks such as x   -Consider consults to  interdisciplinary teams such as x  Outcome: Progressing  Goal: Incision(s), wounds(s) or drain site(s) healing without S/S of infection  Description: INTERVENTIONS  - Assess and document dressing, incision, wound bed, drain sites and surrounding tissue  - Provide patient and family education  - Perform skin care/dressing changes every x  Outcome: Progressing     Problem: HEMATOLOGIC - ADULT  Goal: Maintains hematologic stability  Description: INTERVENTIONS  - Assess for signs and symptoms of bleeding or hemorrhage  - Monitor labs  - Administer supportive blood products/factors as ordered and appropriate  Outcome: Progressing     Problem: MUSCULOSKELETAL - ADULT  Goal: Maintain or return mobility to safest level of function  Description: INTERVENTIONS:  - Assess patient's ability to carry out ADLs; assess patient's baseline for ADL function and identify physical deficits which impact ability to perform ADLs (bathing, care of mouth/teeth, toileting, grooming, dressing, etc )  - Assess/evaluate cause of self-care deficits   - Assess range of motion  - Assess patient's mobility  - Assess patient's need for assistive devices and provide as appropriate  - Encourage maximum independence but intervene and supervise when necessary  - Involve family in performance of ADLs  - Assess for home care needs following discharge   - Consider OT consult to assist with ADL evaluation and planning for discharge  - Provide patient education as appropriate  Outcome: Progressing

## 2022-05-16 NOTE — ASSESSMENT & PLAN NOTE
Patient has chronic anemia  He denies signs of GI  bleeding  Hemoglobin is 8 6 today, stable    Continue monitoring hemoglobin

## 2022-05-16 NOTE — PROGRESS NOTES
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  -Admission creatinine 5 04  Today's creatinine trending down at 4 98  -Peak creatinine 5 5  -baseline creatinine 0 6-0 9  -pt admitted with worsening JANEL as outpatient from prior admission  Creat had plateaued at  High 3s at prior discharge  -workup: UA with 3+ protein, 100 glucose, large blood, innumerable RBC, 10-20 WBC, 2-4 hyaline casts  -previous serologies:  Hepatitis panel, C3/C4, anti double stranded DNA, ANCA, ZAK, GBM ab normal   SPEP/UPEP w/no monoclonal bands   -anti HUGH 2R pending  -holding on renal biopsy for now  -Imaging:  Renal ultrasound 4/28/2022 with no hydronephrosis, normally distended bladder  Normal echogenicity  -nonoliguric   UO 2 5L  -Plan:   Renal function stabilized and improving   Clinically, patient is not uremic and there is no acute indication for renal replacement therapy (dialysis)   s/p albumin and Lasix 5/13   Continue to hold diuretics today   Check anti HUGH 2R, currently pending   Strict I/Os, daily weights   Avoid nephrotoxins, NSAIDs, IV contrast if possible   Avoid hypotension  Maintain MAP >65   Trend BMP   Adjust meds to appropriate GFR   Optimize hemodynamic status to avoid delay in renal recovery   Will d/w Dr Maite Tatum    Nephrotic range proteinuria:  -suspected due to acute glomerulonephritis  -most recent UPCr 12 on 5/5/22, improved from 18  -serology workup unrevealing as noted above  -continue to monitor    Hematuria:  -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  -hx of microscopic hematuria now with intermittent clots  -INR in therapeutic range  -urology following as outpatient    Consider inpatient consult if gross hematuria persists  -management per primary medical service    Hypertension/Volume status:  -BP adequate but with large fluctuations  -clinically, examines euvolemic, slightly hypervolemic  -Home medications:  Amlodipine 5 mg daily, Toprol- mg daily  -Current medications:  Amlodipine 5 mg daily, Toprol  mg daily  -holding further diuretics currently  -Optimize hemodynamic status to avoid delay in renal recovery  -recommend hold parameters on antihypertensive's for SBP <130 mmHg  -Avoid hypotension or fluctuations in blood pressure  Maintain MAP >65  -continue to trend    Hypokalemia:  -K+ 3 4, repleted with 40 mEq  -continue to monitor    Acid/base:  -current bicarb 27, AG 9  -continue to monitor    Anemia:  -Hgb 8 6 and below goal but improving  Goal >10  -hand all system ER with no schistocytes  -Iron studies:  Iron 41, ferritin 521, iron saturation 20%, TIBC 204  -previous SPEP/UPEP with no monoclonal bands  -continue to trend  -Transfuse for Hgb <7 0  -management per primary medical service    MRSA surgical site infection w/OM:  -cervical fusion 3/11/2022 c/b MRSA surgical site infection, osteomyelitis  -s/p debridement washout 4/1/2022  -previously on vancomycin--> daptomycin course completed 4/29/2022  On doxycycline as outpatient  -antibiotics per ID  -surgical site management per Neurosurgery     Factor V Leiden deficiency:  -hx associated DVT  -on chronic Coumadin for factor V Leiden and atrial fibrillation  -INR 2 68  -will require heparin bridging if consideration for future renal biopsy    Hx acute diastolic heart failure:  -echo 4/25/22: EF 55% with grade 2 diastolic pseudo normal relaxation  IVC normal  -intermittent Lasix dosing during current and previous hospitalizations  -clinically, exam euvolemic/mildly hypervolemic  -continue to monitor off diuretics      SUBJECTIVE:  Patient awake, alert without complaints  Adequate urine output    Creatinine in slowly trending down at 4 98   VSS    OBJECTIVE:  Current Weight: Weight - Scale: 104 kg (229 lb 12 8 oz)  Vitals:    05/15/22 2151 05/16/22 0451 05/16/22 0749 05/16/22 0753   BP: 169/86  117/76 162/86   BP Location:   Right arm Left arm   Pulse: 56  (!) 106 57   Resp: 19  18 19   Temp: 97 6 °F (36 4 °C)  98 2 °F (36 8 °C) 97 8 °F (36 6 °C)   TempSrc:   Oral Oral   SpO2: 98%  98% 95%   Weight:  104 kg (229 lb 12 8 oz)         Intake/Output Summary (Last 24 hours) at 5/16/2022 1209  Last data filed at 5/16/2022 1041  Gross per 24 hour   Intake 1322 ml   Output 3175 ml   Net -1853 ml     General:  Awake, alert, appears comfortable and in no acute distress  Nontoxic  Skin:  No rash, warm, good skin turgor   Eyes:  PERRL, EOMI, sclerae nonicteric   no periorbital edema   ENT:  Moist mucous membranes  Neck:  Trachea midline, symmetric  No JVD  Neck brace in place  Posterior incision healing well  Chest:  Clear to auscultation bilaterally without wheezes, crackles or rhonchi  CVS:  Regular rate and rhythm without murmur, gallop or rub  S1 and S2 identified and normal   No S3, S4    Abdomen:  Soft, nontender, nondistended without masses  Normal bowel sounds x 4 quadrants  No bruit  Extremities:  Warm, pink, motor and sensory intact and well perfused  No cyanosis, pallor  1+ BLE edema lower leg  Chronic venous stasis skin changes  Neuro:  Awake, alert, oriented x3  Grossly intact  Psych:  Appropriate affect  Mentating appropriately    Normal mental status exam      Medications:    Current Facility-Administered Medications:     acetaminophen (TYLENOL) tablet 650 mg, 650 mg, Oral, Q6H PRN, Anny Hayes PA-C, 650 mg at 05/15/22 2129    amLODIPine (NORVASC) tablet 5 mg, 5 mg, Oral, Daily, Ninfa Marmolejo MD, 5 mg at 05/16/22 0829    docusate sodium (COLACE) capsule 100 mg, 100 mg, Oral, Daily, Jonelle Lou PA-C, 100 mg at 05/16/22 0829    doxycycline hyclate (VIBRAMYCIN) capsule 100 mg, 100 mg, Oral, Q12H [address], Jonelle Lou PA-C, 100 mg at 05/16/22 0829    DULoxetine (CYMBALTA) delayed release capsule 60 mg, 60 mg, Oral, Daily, Jonelle Lou PA-C, 60 mg at 05/16/22 0829    gabapentin (NEURONTIN) capsule 100 mg, 100 mg, Oral, Daily, Jonelle Lou PA-C, 100 mg at 05/16/22 0829    gabapentin (NEURONTIN) capsule 300 mg, 300 mg, Oral, HS, Jonelle Lou PA-C, 300 mg at 05/15/22 2129    lidocaine (LIDODERM) 5 % patch 1 patch, 1 patch, Topical, Daily PRN, Annette Flores PA-C, 1 patch at 05/15/22 0549    methocarbamol (ROBAXIN) tablet 750 mg, 750 mg, Oral, Q6H PRN, Andris Curling, PA-C, 750 mg at 05/15/22 2128    metoprolol succinate (TOPROL-XL) 24 hr tablet 100 mg, 100 mg, Oral, Daily, Jonelle Lou PA-C, 100 mg at 05/16/22 0829    ondansetron (ZOFRAN) injection 4 mg, 4 mg, Intravenous, Q4H PRN, Annette Flores PA-C, 4 mg at 05/12/22 2145    pantoprazole (PROTONIX) EC tablet 40 mg, 40 mg, Oral, BID AC, Jonelle Lou PA-C, 40 mg at 05/16/22 0500    pravastatin (PRAVACHOL) tablet 40 mg, 40 mg, Oral, Daily With Dinner, Andris Curling, PA-C, 40 mg at 05/15/22 1659    tamsulosin (FLOMAX) capsule 0 4 mg, 0 4 mg, Oral, Daily With Dinner, Andris Curling, PA-C, 0 4 mg at 05/15/22 1659    warfarin (COUMADIN) tablet 5 mg, 5 mg, Oral, Daily (warfarin), Jose Miguel Barrett MD, 5 mg at 05/15/22 1700    Laboratory Results:  Results from last 7 days   Lab Units 05/16/22  0455 05/15/22  0512 05/14/22  0520 05/13/22  0451 05/12/22  0349 05/11/22  1338 05/11/22  0334 05/10/22  1950 05/10/22  1950 05/10/22  1949   WBC Thousand/uL 6 08 5 92 6 35 6 79 6 34  --  5 31  --   --  6 08   HEMOGLOBIN g/dL 8 6* 8 0* 7 6* 7 7* 7 6*  --  7 1*  --   --  7 9*   HEMATOCRIT % 27 4* 25 4* 24 3* 23 9* 23 8*  --  22 3*  --   --  24 8*   PLATELETS Thousands/uL 187 195 195 206 197  --  192  --   --  237   POTASSIUM mmol/L 3 4* 3 3* 3 4* 3 3* 3 4* 3 7 3 2*   < > 2 8*  --    CHLORIDE mmol/L 104 102 103 103 104 103 102   < > 100  --    CO2 mmol/L 27 27 28 27 28 27 26   < > 27  --    BUN mg/dL 46* 48* 47* 42* 39* 36* 35*   < > 35*  --    CREATININE mg/dL 4 98* 5 21* 5 39* 5 50* 5 11* 4 99* 5 00*   < > 5 04*  --    CALCIUM mg/dL 8 8 8 5 8 6 8 7 8 1* 7 9* 8 1*   < > 7 9*  --    MAGNESIUM mg/dL  --   --   --   --   --   --   --   --  1 4*  --    PHOSPHORUS mg/dL  --   --   --   --   --   --   --   --  5 4*  --     < > = values in this interval not displayed  I have personally reviewed the blood work as stated above and in my note  I have personally reviewed internal Medicine, co-consultants and previous nephrology notes

## 2022-05-16 NOTE — PROGRESS NOTES
General Cardiology   Progress Note -  [facility]   Karine Wei 71 y o  male MRN: [id number]    Unit/Bed#: -01 Encounter: [id number]    Assessment:    JANEL/worsening CKD  · Recent admit 4/22-4/29 with JANEL 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; seen by Nephrology at that time who recommended fluid restriction and albumin; creat on discharge was 3 8  · Creat on admit 5 0   · Evaluated by Nephrology and IV lasix 80 mg x 1 dose given 5/11 with good diuretic response; creat remained at 5 1  ·  additional IV lasix 60 mg x 1 dose given 5/12 and 5/13 as well; creat noted to elevated to 5 5 on 5/13 and further diuretics were held  · Creat now improving to 4 9 off all IV/PO diuretics  · Continued management per Nephrology  · Worsening renal failure may be underlying cause to volume overload/LE edema in conjunction with hypoalbuminemia as opposed to acute CHF     Paroxysmal afib/aflutter  · Hx of aflutter ablation 2015  · Home meds: flecainide 100 mg BID, Toprol  mg QD  · EKG yesterday AM is sinus bradycardia with 1st degree AV block and RBBB rate of 56  · In setting of him being on flecainide and having JANEL, QRS duration was assessed; he has hx of RBBB and QRS in the past has been <200; AM of 5/11 his QRS is 218, we will hold flecainide at this time and can reconsider starting prior to d/c at reduced dose depending on his renal function  · Patient is not on tele but rhythm is regular by physical exam      Hypokalemia  · K+ 2 8 on admit; repleted by SLIM  · K+ this AM 3 4, continue to replete; goal 4 0 in setting of PAF     Diastolic HF  · Chronic B/L LE edema noted by Dr Kale Solano in last office note; Dr Kale Solano felt likely secondary to hypoalbuminemia (2 1 on admit); IV lasix trialed last admit with minimal response  · TTE from 4/2022: EF 55%; no WMA; grade 2DD; RV normal size and function; mild MR/TR  · Not on PO diuretic as outpatient  · See JANEL/renal failure above; Nephrology has been ordering IV diuretics     Essential HTN  · SBP averaging 160's  · Home meds: amlodipine 5 mg QD, Toprol  mg QD     HLD  · Pravastatin 40 mg QD     Factor V Leiden  · On Coumadin; INR on admit 1 6  · INR from this AM is 2 6     WILL  · Compliant with CPAP     Surgical site infection- s/p cervical fusion 2/70/7052 complicated by MRSA        Plan/Recommendations:  · Continue to hold flecainide; will review with Cardiology attending if we should restart prior to discharge vs  Chose another anti-arrhythmic  · Continue remainder of home meds  · IV/PO diuretics per Nephrology     _________________________________________________________________    Subjective  Patient offers no acute complaints this morning  Review of Systems   Constitutional: Negative for chills, fever and malaise/fatigue  HENT: Negative for congestion  Cardiovascular: Negative for chest pain, dyspnea on exertion, leg swelling, orthopnea and palpitations  Respiratory: Negative for cough, shortness of breath (no SOB at rest) and wheezing  Endocrine: Negative  Hematologic/Lymphatic: Negative  Skin: Negative  Musculoskeletal: Positive for back pain  Gastrointestinal: Negative for bloating, abdominal pain, nausea and vomiting  Genitourinary: Negative  Neurological: Negative for dizziness and light-headedness  Psychiatric/Behavioral: Negative  All other systems reviewed and are negative  Objective:   Vitals: Blood pressure 162/86, pulse 57, temperature 97 8 °F (36 6 °C), temperature source Oral, resp  rate 19, weight 104 kg (229 lb 12 8 oz), SpO2 95 %  ,     Wt Readings from Last 3 Encounters:   05/16/22 104 kg (229 lb 12 8 oz)   05/03/22 114 kg (251 lb)   04/29/22 113 kg (250 lb 1 6 oz)        Lab Results   Component Value Date    CREATININE 4 98 (H) 05/16/2022    CREATININE 5 21 (H) 05/15/2022    CREATININE 5 39 (H) 05/14/2022         Body mass index is 32 05 kg/m²  ,     Systolic (30WIC), OUT:959 , Min:117 , YEZ:383     Diastolic (25YWY), JWW:30, Min:76, Max:86          Intake/Output Summary (Last 24 hours) at 5/16/2022 1054  Last data filed at 5/16/2022 1041  Gross per 24 hour   Intake 1322 ml   Output 3175 ml   Net -1853 ml     Weight (last 2 days)     Date/Time Weight    05/16/22 0451 104 (229 8)    05/15/22 0527 106 (232 7)    05/14/22 1209 106 (233)    05/14/22 0600 106 (234 13)    05/14/22 0507 106 (234 13)            Telemetry Review: Not on tele        Physical Exam  Vitals reviewed  Constitutional:       General: He is not in acute distress  HENT:      Head: Normocephalic and atraumatic  Mouth/Throat:      Mouth: Mucous membranes are moist    Cardiovascular:      Rate and Rhythm: Normal rate and regular rhythm  Heart sounds: S1 normal and S2 normal  Murmur heard  Pulmonary:      Effort: Pulmonary effort is normal  No respiratory distress  Breath sounds: Normal breath sounds  Abdominal:      General: Bowel sounds are normal  There is no distension  Palpations: Abdomen is soft  Musculoskeletal:         General: Normal range of motion  Cervical back: Normal range of motion and neck supple  Right lower leg: Edema present  Left lower leg: Edema present  Skin:     General: Skin is warm and dry  Neurological:      Mental Status: He is alert and oriented to person, place, and time     Psychiatric:         Mood and Affect: Mood normal          LABORATORY RESULTS      CBC with diff:   Results from last 7 days   Lab Units 05/16/22  0455 05/15/22  0512 05/14/22  0520 05/13/22  0451 05/12/22  0349 05/11/22  0334 05/10/22  1949   WBC Thousand/uL 6 08 5 92 6 35 6 79 6 34 5 31 6 08   HEMOGLOBIN g/dL 8 6* 8 0* 7 6* 7 7* 7 6* 7 1* 7 9*   HEMATOCRIT % 27 4* 25 4* 24 3* 23 9* 23 8* 22 3* 24 8*   MCV fL 92 92 92 92 92 91 91   PLATELETS Thousands/uL 187 195 195 206 197 192 237   MCH pg 28 8 28 9 28 8 29 5 29 2 29 0 28 9   MCHC g/dL 31 4 31 5 31 3* 32 2 31 9 31 8 31 9   RDW % 14 5 14 3 14 6 14 5 14 4 14 4 14 2   MPV fL 10 3 10 9 10 5 10 6 10 8 10 1 10 2   NRBC AUTO /100 WBCs 0 0 0 0 0 0 0       CMP:  Results from last 7 days   Lab Units 05/16/22  0455 05/15/22  0512 05/14/22  0520 05/13/22  0451 05/12/22  0349 05/11/22  1338 05/11/22  0334 05/10/22  1950   POTASSIUM mmol/L 3 4* 3 3* 3 4* 3 3* 3 4* 3 7 3 2* 2 8*   CHLORIDE mmol/L 104 102 103 103 104 103 102 100   CO2 mmol/L 27 27 28 27 28 27 26 27   BUN mg/dL 46* 48* 47* 42* 39* 36* 35* 35*   CREATININE mg/dL 4 98* 5 21* 5 39* 5 50* 5 11* 4 99* 5 00* 5 04*   CALCIUM mg/dL 8 8 8 5 8 6 8 7 8 1* 7 9* 8 1* 7 9*   AST U/L  --  15 16 10 14  --   --  14   ALT U/L  --  8* 7* 11* 9*  --   --  10*   ALK PHOS U/L  --  86 90 91 81  --   --  94   EGFR ml/min/1 73sq m 10 10 9 9 10 10 10 10       BMP:  Results from last 7 days   Lab Units 05/16/22  0455 05/15/22  0512 05/14/22  0520 05/13/22 0451 05/12/22  0349 05/11/22  1338 05/11/22  0334   POTASSIUM mmol/L 3 4* 3 3* 3 4* 3 3* 3 4* 3 7 3 2*   CHLORIDE mmol/L 104 102 103 103 104 103 102   CO2 mmol/L 27 27 28 27 28 27 26   BUN mg/dL 46* 48* 47* 42* 39* 36* 35*   CREATININE mg/dL 4 98* 5 21* 5 39* 5 50* 5 11* 4 99* 5 00*   CALCIUM mg/dL 8 8 8 5 8 6 8 7 8 1* 7 9* 8 1*       Lab Results   Component Value Date    NTBNP 3,948 (H) 04/22/2022    NTBNP 121 03/04/2022             Results from last 7 days   Lab Units 05/10/22  1950   MAGNESIUM mg/dL 1 4*                     Results from last 7 days   Lab Units 05/16/22  0455 05/15/22  0512 05/14/22  0459 05/13/22  0451 05/11/22  0334 05/10/22  1951   INR  2 68* 1 84* 1 44* 1 68* 3 18* 3 15*       Lipid Profile:   Lab Results   Component Value Date    CHOL 151 10/25/2017    CHOL 169 11/03/2016    CHOL 176 09/10/2015     Lab Results   Component Value Date    HDL 50 03/05/2022    HDL 47 11/02/2021    HDL 65 11/05/2020     Lab Results   Component Value Date    LDLCALC 123 (H) 03/05/2022    LDLCALC 107 (H) 11/02/2021    LDLCALC 128 (H) 11/05/2020     Lab Results   Component Value Date    TRIG 164 (H) 03/05/2022 TRIG 181 (H) 2021    TRIG 116 2020       Cardiac testing:   Results for orders placed during the hospital encounter of 18    Echo complete with contrast if indicated    Narrative  [facility] [address] [facility]  [phone]    Transthoracic Echocardiogram  2D, M-mode, Doppler, and Color Doppler    Study date:  2018    Patient: Zana Hernandez  MR number: [id number]  Account number: [de-identified]  : 1950  Age: 80 years  Gender: Male  Status: Outpatient  Location: [address] [place] [address]  Height: 72 in  Weight: 277 4 lb  BP: 116/ 78 mmHg    Indications: Atrial fibrillation  Diagnoses: I48 0 - Atrial fibrillation    Sonographer:  KAY Plummer RDCS RVT  Primary Physician:  Viet Frost MD  Referring Physician:  Nathan Snider MD  Group:  [facility]  Interpreting Physician:  Ramona Giles MD    SUMMARY    LEFT VENTRICLE:  Systolic function was mildly reduced by visual assessment  Ejection fraction was estimated to be 45 %  There was mild diffuse hypokinesis  Wall thickness was mildly increased  LEFT ATRIUM:  The atrium was mildly dilated  RIGHT ATRIUM:  The atrium was mildly dilated  MITRAL VALVE:  There was mild regurgitation  TRICUSPID VALVE:  There was mild regurgitation  HISTORY: PRIOR HISTORY: Hyperlipidemia, Coumadin, Sleep apnea  PRIOR PROCEDURES: Arrhythmia ablation  PROCEDURE: The study was performed in the [address] [place] [address]  This was a routine study  The transthoracic approach was used  The study included complete 2D imaging, M-mode, complete spectral Doppler, and color Doppler  Images were obtained from the parasternal, apical, subcostal, and suprasternal notch acoustic windows  Echocardiographic views were limited due to decreased penetration and lung interference  This was a technically difficult study      LEFT VENTRICLE: Size was normal  Systolic function was mildly reduced by visual assessment  Ejection fraction was estimated to be 45 %  There was mild diffuse hypokinesis  Wall thickness was mildly increased  DOPPLER: Transmitral flow  pattern: atrial fibrillation  RIGHT VENTRICLE: The size was normal  Systolic function was normal  DOPPLER: Systolic pressure was not estimated  LEFT ATRIUM: The atrium was mildly dilated  RIGHT ATRIUM: The atrium was mildly dilated  MITRAL VALVE: Valve structure was normal  There was normal leaflet separation  DOPPLER: The transmitral velocity was within the normal range  There was no evidence for stenosis  There was mild regurgitation  AORTIC VALVE: The valve was trileaflet  Leaflets exhibited normal thickness, mild calcification, and normal cuspal separation  DOPPLER: Transaortic velocity was within the normal range  There was no evidence for stenosis  There was no  regurgitation  TRICUSPID VALVE: The valve structure was normal  There was normal leaflet separation  DOPPLER: The transtricuspid velocity was within the normal range  There was no evidence for stenosis  There was mild regurgitation  PULMONIC VALVE: Leaflets exhibited normal thickness, no calcification, and normal cuspal separation  DOPPLER: The transpulmonic velocity was within the normal range  There was no regurgitation  PERICARDIUM: There was no pericardial effusion  AORTA: The root exhibited normal size  SYSTEMIC VEINS: IVC: The inferior vena cava was normal in size and course   Respirophasic changes were normal     SYSTEM MEASUREMENT TABLES    2D  %FS: 32 47 %  Ao Diam: 3 04 cm  EDV(Teich): 131 46 ml  EF(Teich): 60 39 %  ESV(Cube): 44 15 ml  ESV(Teich): 52 08 ml  IVSd: 1 17 cm  LA Area: 25 58 cm2  LA Diam: 5 06 cm  LVEDV MOD A4C: 85 53 ml  LVEF MOD A4C: 57 2 %  LVESV MOD A4C: 36 61 ml  LVIDd: 5 23 cm  LVIDs: 3 53 cm  LVLd A4C: 7 43 cm  LVLs A4C: 6 23 cm  LVPWd: 1 17 cm  RA Area: 20 53 cm2  RV Diam : 4 cm  SI(Cube): 40 49 ml/m2  SI(Teich): 32 4 ml/m2  SV MOD A4C: 48 92 ml  SV(Cube): 99 21 ml  SV(Teich): 79 38 ml    MM  TAPSE: 2 06 cm    PW  E': 0 08 m/s    [employer] Accredited Echocardiography Laboratory    Prepared and electronically signed by    Alma Isbell MD  Signed 2018 12:00:40    No results found for this or any previous visit  No results found for this or any previous visit  No valid procedures specified  Results for orders placed during the hospital encounter of 18    NM myocardial perfusion spect (stress and/or rest)    Narrative  [facility] [place] [employer] [address]    Stress Gated SPECT Myocardial Perfusion Imaging after Exercise    Patient: Christopher Madison  MR number: [id number]  Account number: [de-identified]  : 1950  Age: 80 years  Gender: Male  Status: Outpatient  Location: [address] [place] [address]  Height: 72 in  Weight: 258 lb  BP: 116/ 93 mmHg    Allergies: MORPHINE    Diagnosis: I48 1 - Atrial flutter    Primary Physician:  Allie Ramírez MD  RN:  Anisha Salcedo  Referring Physician:  Luc Jason MD  Technician:  Babak Flies:  [facility]  Interpreting Physician:  Luc Jason MD    HISTORY: Factor V Leiden, Ablation, WILL, Cardioversion  The patient is a 80year old  male  Chest pain status: no chest pain  Coronary artery disease risk factors: dyslipidemia and family history of premature coronary artery  disease  Cardiovascular history: arrhythmia  Co-morbidity: obesity  Previous test results: abnormal ECG  PHYSICAL EXAM: Baseline physical exam screening: no wheezes audible  REST ECG: Atrial fibrillation  PROCEDURE: The study was performed in the the [address] [place] [address]  Treadmill exercise testing was performed, using the Nirmal protocol  Gated SPECT myocardial perfusion imaging was performed during stress   Systolic blood  pressure was 116 mmHg, at the start of the study  Diastolic blood pressure was 93 mmHg, at the start of the study  The heart rate was 87 bpm, at the start of the study  IV double checked  ALYX PROTOCOL:  HR bpm SBP mmHg DBP mmHg Symptoms  Baseline 87 116 93 none  Stage 1 120 100 71 leg pain  Stage 2 144 144 72 fatigue, leg pain  Recovery 1 137 174 93 subsiding  Recovery 2 136 176 99 subsiding  Recovery 3 136 148 97 none  Recovery 5 115 137 99 none  No medications or fluids given  STRESS SUMMARY: 02 sat 97% baseline, 97% peak  Duration of exercise was 6 min and 0 sec  The patient exercised to protocol stage 2  Maximal work rate was 7 METs  Functional capacity was slightly decreased (20% to 30%)  Maximal heart rate  during stress was 164 bpm ( 107 % of maximal predicted heart rate)  The heart rate response to stress was normal  There was normal resting blood pressure with an appropriate response to stress  The rate-pressure product for the peak heart  rate and blood pressure was 83167  There was no chest pain during stress  The stress test was terminated due to leg pain and fatigue  The stress ECG was negative for ischemia and normal  Arrhythmia during stress: Persistence of atrial  fibrillation throughout the study  ISOTOPE ADMINISTRATION:  Resting isotope administration Stress isotope administration  Agent Tetrofosmin Tetrofosmin  Dose 15 8 mCi 48 2 mCi  Date 09/21/2018 09/21/2018  Injection-image interval 44 min 41 min    The radiopharmaceutical was injected one minute before the end of exercise  MYOCARDIAL PERFUSION IMAGING:  The image quality was good  Rotating projection images reveal mild diaphragmatic attenuation  Left ventricular size was normal  The TID ratio was 0 88  PERFUSION DEFECTS:  -  There were no significant perfusion defects  PERFUSION QUANTITATION:  The summed perfusion score measured 0 during stress  GATED SPECT:  The calculated left ventricular ejection fraction was 61 %   Left ventricular ejection fraction was within normal limits by visual estimate  There was no left ventricular regional abnormality  SUMMARY:  -  Stress results: Duration of exercise was 6 min and 0 sec  Maximal work rate was 7 METs  Functional capacity was slightly decreased (20% to 30%)  Target heart rate was achieved  There was no chest pain during stress  -  ECG conclusions: The stress ECG was negative for ischemia and normal  Arrhythmia during stress: Persistence of atrial fibrillation throughout the study  -  Perfusion imaging: There were no significant perfusion defects   -  Gated SPECT: The calculated left ventricular ejection fraction was 61 %  Left ventricular ejection fraction was within normal limits by visual estimate  There was no left ventricular regional abnormality  IMPRESSIONS: Normal study after maximal exercise  Myocardial perfusion imaging was normal at rest and with stress   Left ventricular systolic function was normal     Prepared and signed by    Alana Gonzalez MD  Signed 09/21/2018 13:46:16        Meds/Allergies   current meds:   Current Facility-Administered Medications   Medication Dose Route Frequency    acetaminophen (TYLENOL) tablet 650 mg  650 mg Oral Q6H PRN    amLODIPine (NORVASC) tablet 5 mg  5 mg Oral Daily    docusate sodium (COLACE) capsule 100 mg  100 mg Oral Daily    doxycycline hyclate (VIBRAMYCIN) capsule 100 mg  100 mg Oral Q12H [address]    DULoxetine (CYMBALTA) delayed release capsule 60 mg  60 mg Oral Daily    gabapentin (NEURONTIN) capsule 100 mg  100 mg Oral Daily    gabapentin (NEURONTIN) capsule 300 mg  300 mg Oral HS    lidocaine (LIDODERM) 5 % patch 1 patch  1 patch Topical Daily PRN    methocarbamol (ROBAXIN) tablet 750 mg  750 mg Oral Q6H PRN    metoprolol succinate (TOPROL-XL) 24 hr tablet 100 mg  100 mg Oral Daily    ondansetron (ZOFRAN) injection 4 mg  4 mg Intravenous Q4H PRN    pantoprazole (PROTONIX) EC tablet 40 mg  40 mg Oral BID AC    pravastatin (PRAVACHOL) tablet 40 mg  40 mg Oral Daily With Dinner    tamsulosin (FLOMAX) capsule 0 4 mg  0 4 mg Oral Daily With Dinner    warfarin (COUMADIN) tablet 5 mg  5 mg Oral Daily (warfarin)     Medications Prior to Admission   Medication    acetaminophen (TYLENOL) 325 mg tablet    amLODIPine (NORVASC) 5 mg tablet    docusate sodium (COLACE) 100 mg capsule    doxycycline (ADOXA) 100 MG tablet    DULoxetine (CYMBALTA) 60 mg delayed release capsule    flecainide (TAMBOCOR) 100 mg tablet    gabapentin (NEURONTIN) 100 mg capsule    gabapentin (NEURONTIN) 300 mg capsule    methocarbamol (ROBAXIN) 750 mg tablet    metoprolol succinate (TOPROL-XL) 100 mg 24 hr tablet    ondansetron (ZOFRAN) 4 mg tablet    pantoprazole (PROTONIX) 40 mg tablet    pravastatin (PRAVACHOL) 40 mg tablet    tamsulosin (FLOMAX) 0 4 mg    warfarin (COUMADIN) 5 mg tablet    polyethylene glycol (MIRALAX) 17 g packet            Counseling / Coordination of Care  Total floor / unit time spent today 20 minutes  Greater than 50% of total time was spent with the patient and / or family counseling and / or coordination of care  ** Please Note: Dragon 360 Dictation voice to text software may have been used in the creation of this document   **

## 2022-05-16 NOTE — TELEPHONE ENCOUNTER
Rosa Bauer, patient's spouse, called the office reporting the patient is currently admitted to the hospital for JANEL/worsening CKD  She inquired when the patient can obtain the cervical spine xrays prior to the appointment on 5/24/22  Advised the patient can obtain them now until one day prior to the appointment  She expressed understanding and was appreciative

## 2022-05-16 NOTE — PROGRESS NOTES
[place]  Progress Note - Tawana Cox 1950, 71 y o  male MRN: [id number]  Unit/Bed#: [facility] Encounter: [id number]  Primary Care Provider: Cynthia Portillo MD   Date and time admitted to hospital: 5/10/2022  6:51 PM    * JANEL (acute kidney injury) [facility])  Assessment & Plan  Patient admitted with acute kidney injury felt to be most likely due to post infectious  glomerulonephritis  Patient's renal function is improving and urine output has been good:  2500 cc last 24 hours    Will continue monitor renal function closely    I discussed the case with Nephrology    Paroxysmal A-fib [facility])  Assessment & Plan  Patient has PAF  He denies palpitations  Patient maintained sinus rhythm    Continue Toprol    He denies signs of bleeding  I was 2 68 today  Warfarin 5 mg was given  Will monitor INR closely    Anemia  Assessment & Plan  Patient has chronic anemia  He denies signs of GI  bleeding  Hemoglobin is 8 6 today, stable  Continue monitoring hemoglobin        VTE Prophylaxis: in place    Patient Centered Rounds: I rounded with patient's nurse    Current Length of Stay: 6 day(s)    Current Patient Status: Inpatient    Certification Statement: Pt requires additional inpatient hospital stay due to: see assessment and plan        Subjective:   Patient denies chest pain, palpitations, abdominal pain, difficulty urinating signs of bleeding    All other ROS are negative    Objective:     Vitals:   Temp (24hrs), Av 8 °F (36 6 °C), Min:97 4 °F (36 3 °C), Max:98 2 °F (36 8 °C)    Temp:  [97 4 °F (36 3 °C)-98 2 °F (36 8 °C)] 97 4 °F (36 3 °C)  HR:  [] 60  Resp:  [18-19] 19  BP: (117-179)/(76-87) 179/87  SpO2:  [95 %-98 %] 95 %  Body mass index is 32 05 kg/m²  Input and Output Summary (last 24 hours):        Intake/Output Summary (Last 24 hours) at 2022 1904  Last data filed at 2022 1745  Gross per 24 hour   Intake 1284 ml   Output 2875 ml   Net -1591 ml Physical Exam:     Physical Exam  HENT:      Head: Normocephalic  Mouth/Throat:      Mouth: Mucous membranes are moist    Eyes:      Conjunctiva/sclera: Conjunctivae normal    Cardiovascular:      Rate and Rhythm: Normal rate and regular rhythm  Heart sounds: Murmur (Systolic murmur was present) heard  Pulmonary:      Effort: No respiratory distress  Breath sounds: No wheezing or rales  Musculoskeletal:      Cervical back: Neck supple  Skin:     General: Skin is warm and dry  Comments: Patient has chronic trace lower extremity edema   Neurological:      Mental Status: He is alert and oriented to person, place, and time  Cranial Nerves: No cranial nerve deficit  Motor: No weakness ( moves all extremities on command)  I personally reviewed labs and imaging reports for today        Last 24 Hours Medication List:   Current Facility-Administered Medications   Medication Dose Route Frequency Provider Last Rate    acetaminophen  650 mg Oral Q6H PRN Melly Dcik PA-C      amLODIPine  5 mg Oral Daily Chiara Avalos MD      docusate sodium  100 mg Oral Daily Melly Dick PA-C      doxycycline hyclate  100 mg Oral Q12H [address] Melly Dick PA-C      DULoxetine  60 mg Oral Daily Melly Dick PA-C      gabapentin  100 mg Oral Daily Melly Dick PA-C      gabapentin  300 mg Oral HS Melly Dick PA-C      lidocaine  1 patch Topical Daily PRN KASSYvoelias Fuentes PA-C      methocarbamol  750 mg Oral Q6H PRN Melly Dick PA-C      metoprolol succinate  100 mg Oral Daily Melly Dick PA-C      ondansetron  4 mg Intravenous Q4H PRN Annette Flores PA-C      pantoprazole  40 mg Oral BID AC Jonelle Lou PA-C      pravastatin  40 mg Oral Daily With FABIOLA Nesbitt      tamsulosin  0 4 mg Oral Daily With Dinner Melly Dick PA-C            Today, Patient Was Seen By: Holly Ferraro MD    ** Please Note: Dictation voice to text software may have been used in the creation of this document   **

## 2022-05-16 NOTE — ASSESSMENT & PLAN NOTE
Patient admitted with acute kidney injury felt to be most likely due to post infectious  glomerulonephritis      Patient's renal function is improving and urine output has been good:  2500 cc last 24 hours    Will continue monitor renal function closely    I discussed the case with Nephrology

## 2022-05-16 NOTE — ASSESSMENT & PLAN NOTE
Patient has PAF  He denies palpitations  Patient maintained sinus rhythm    Continue Toprol    He denies signs of bleeding  I was 2 68 today  Warfarin 5 mg was given    Will monitor INR closely

## 2022-05-17 PROBLEM — R31.0 GROSS HEMATURIA: Status: ACTIVE | Noted: 2022-05-17

## 2022-05-17 PROBLEM — R53.1 WEAKNESS: Status: RESOLVED | Noted: 2022-03-04 | Resolved: 2022-05-17

## 2022-05-17 LAB
ANION GAP SERPL CALCULATED.3IONS-SCNC: 13 MMOL/L (ref 4–13)
BUN SERPL-MCNC: 43 MG/DL (ref 5–25)
CALCIUM SERPL-MCNC: 8.3 MG/DL (ref 8.3–10.1)
CHLORIDE SERPL-SCNC: 101 MMOL/L (ref 100–108)
CO2 SERPL-SCNC: 26 MMOL/L (ref 21–32)
CREAT SERPL-MCNC: 5.08 MG/DL (ref 0.6–1.3)
GFR SERPL CREATININE-BSD FRML MDRD: 10 ML/MIN/1.73SQ M
GLUCOSE SERPL-MCNC: 103 MG/DL (ref 65–140)
HCT VFR BLD AUTO: 26 % (ref 36.5–49.3)
HGB BLD-MCNC: 8 G/DL (ref 12–17)
INR PPP: 3.43 (ref 0.84–1.19)
MYCOBACTERIUM SPEC CULT: NORMAL
POTASSIUM SERPL-SCNC: 3.3 MMOL/L (ref 3.5–5.3)
PROTHROMBIN TIME: 33.6 SECONDS (ref 11.6–14.5)
RHODAMINE-AURAMINE STN SPEC: NORMAL
SODIUM SERPL-SCNC: 140 MMOL/L (ref 136–145)

## 2022-05-17 PROCEDURE — 85610 PROTHROMBIN TIME: CPT | Performed by: INTERNAL MEDICINE

## 2022-05-17 PROCEDURE — 80048 BASIC METABOLIC PNL TOTAL CA: CPT | Performed by: INTERNAL MEDICINE

## 2022-05-17 PROCEDURE — 99232 SBSQ HOSP IP/OBS MODERATE 35: CPT | Performed by: INTERNAL MEDICINE

## 2022-05-17 PROCEDURE — 85014 HEMATOCRIT: CPT | Performed by: INTERNAL MEDICINE

## 2022-05-17 PROCEDURE — 99222 1ST HOSP IP/OBS MODERATE 55: CPT | Performed by: PHYSICIAN ASSISTANT

## 2022-05-17 PROCEDURE — 85018 HEMOGLOBIN: CPT | Performed by: INTERNAL MEDICINE

## 2022-05-17 RX ORDER — POTASSIUM CHLORIDE 20 MEQ/1
20 TABLET, EXTENDED RELEASE ORAL DAILY
Status: DISCONTINUED | OUTPATIENT
Start: 2022-05-17 | End: 2022-05-19

## 2022-05-17 RX ORDER — AMLODIPINE BESYLATE 5 MG/1
10 TABLET ORAL DAILY
Status: DISCONTINUED | OUTPATIENT
Start: 2022-05-17 | End: 2022-05-27

## 2022-05-17 RX ORDER — POTASSIUM CHLORIDE 20 MEQ/1
40 TABLET, EXTENDED RELEASE ORAL ONCE
Status: COMPLETED | OUTPATIENT
Start: 2022-05-17 | End: 2022-05-17

## 2022-05-17 RX ADMIN — ACETAMINOPHEN 650 MG: 325 TABLET, FILM COATED ORAL at 21:14

## 2022-05-17 RX ADMIN — LIDOCAINE 5% 1 PATCH: 700 PATCH TOPICAL at 00:32

## 2022-05-17 RX ADMIN — GABAPENTIN 300 MG: 300 CAPSULE ORAL at 21:14

## 2022-05-17 RX ADMIN — PANTOPRAZOLE SODIUM 40 MG: 40 TABLET, DELAYED RELEASE ORAL at 05:05

## 2022-05-17 RX ADMIN — POTASSIUM CHLORIDE 40 MEQ: 1500 TABLET, EXTENDED RELEASE ORAL at 08:49

## 2022-05-17 RX ADMIN — DOCUSATE SODIUM 100 MG: 100 CAPSULE, LIQUID FILLED ORAL at 08:49

## 2022-05-17 RX ADMIN — POTASSIUM CHLORIDE 20 MEQ: 1500 TABLET, EXTENDED RELEASE ORAL at 08:49

## 2022-05-17 RX ADMIN — DULOXETINE 60 MG: 30 CAPSULE, DELAYED RELEASE ORAL at 08:48

## 2022-05-17 RX ADMIN — TAMSULOSIN HYDROCHLORIDE 0.4 MG: 0.4 CAPSULE ORAL at 15:46

## 2022-05-17 RX ADMIN — PANTOPRAZOLE SODIUM 40 MG: 40 TABLET, DELAYED RELEASE ORAL at 15:46

## 2022-05-17 RX ADMIN — METOPROLOL SUCCINATE 100 MG: 50 TABLET, EXTENDED RELEASE ORAL at 08:49

## 2022-05-17 RX ADMIN — ACETAMINOPHEN 650 MG: 325 TABLET, FILM COATED ORAL at 04:51

## 2022-05-17 RX ADMIN — METHOCARBAMOL TABLETS 750 MG: 500 TABLET, COATED ORAL at 00:32

## 2022-05-17 RX ADMIN — DOXYCYCLINE 100 MG: 100 CAPSULE ORAL at 08:48

## 2022-05-17 RX ADMIN — DOXYCYCLINE 100 MG: 100 CAPSULE ORAL at 21:14

## 2022-05-17 RX ADMIN — ONDANSETRON 4 MG: 2 INJECTION INTRAMUSCULAR; INTRAVENOUS at 20:01

## 2022-05-17 RX ADMIN — GABAPENTIN 100 MG: 100 CAPSULE ORAL at 08:49

## 2022-05-17 RX ADMIN — PRAVASTATIN SODIUM 40 MG: 40 TABLET ORAL at 15:46

## 2022-05-17 RX ADMIN — AMLODIPINE BESYLATE 10 MG: 5 TABLET ORAL at 08:49

## 2022-05-17 NOTE — ASSESSMENT & PLAN NOTE
Patient has gross hematuria today  He felt that it was present before  Urinalysis did show hematuria on May 11th  Today patient does have gross hematuria  He has no abdominal pain  Ultrasound of the kidneys and bladder were unrevealing on April 28th      I will hold warfarin  Consult urology

## 2022-05-17 NOTE — ASSESSMENT & PLAN NOTE
Patient has PAF  He denies palpitations  Patient maintained sinus rhythm    Continue Toprol    INR is 3 43    Will hold warfarin before possible renal biopsy as well as due to hematuria

## 2022-05-17 NOTE — PLAN OF CARE
Problem: PAIN - ADULT  Goal: Verbalizes/displays adequate comfort level or baseline comfort level  Description: Interventions:  - Encourage patient to monitor pain and request assistance  - Assess pain using appropriate pain scale  - Administer analgesics based on type and severity of pain and evaluate response  - Implement non-pharmacological measures as appropriate and evaluate response  - Consider cultural and social influences on pain and pain management  - Notify physician/advanced practitioner if interventions unsuccessful or patient reports new pain  Outcome: Progressing     Problem: INFECTION - ADULT  Goal: Absence or prevention of progression during hospitalization  Description: INTERVENTIONS:  - Assess and monitor for signs and symptoms of infection  - Monitor lab/diagnostic results  - Monitor all insertion sites, i e  indwelling lines, tubes, and drains  - Monitor endotracheal if appropriate and nasal secretions for changes in amount and color  - [place] appropriate cooling/warming therapies per order  - Administer medications as ordered  - Instruct and encourage patient and family to use good hand hygiene technique  - Identify and instruct in appropriate isolation precautions for identified infection/condition  Outcome: Progressing     Problem: SAFETY ADULT  Goal: Patient will remain free of falls  Description: INTERVENTIONS:  - Educate patient/family on patient safety including physical limitations  - Instruct patient to call for assistance with activity   - Consult OT/PT to assist with strengthening/mobility   - Keep Call bell within reach  - Keep bed low and locked with side rails adjusted as appropriate  - Keep care items and personal belongings within reach  - Initiate and maintain comfort rounds  - Make Fall Risk Sign visible to staff  - Offer Toileting every 2 Hours, in advance of need  - Initiate/Maintain alarm  - Obtain necessary fall risk management equipment:   - Apply yellow socks and bracelet for high fall risk patients  - Consider moving patient to room near nurses station  Outcome: Progressing  Goal: Maintain or return to baseline ADL function  Description: INTERVENTIONS:  -  Assess patient's ability to carry out ADLs; assess patient's baseline for ADL function and identify physical deficits which impact ability to perform ADLs (bathing, care of mouth/teeth, toileting, grooming, dressing, etc )  - Assess/evaluate cause of self-care deficits   - Assess range of motion  - Assess patient's mobility; develop plan if impaired  - Assess patient's need for assistive devices and provide as appropriate  - Encourage maximum independence but intervene and supervise when necessary  - Involve family in performance of ADLs  - Assess for home care needs following discharge   - Consider OT consult to assist with ADL evaluation and planning for discharge  - Provide patient education as appropriate  Outcome: Progressing  Goal: Maintains/Returns to pre admission functional level  Description: INTERVENTIONS:  - Perform BMAT or MOVE assessment daily    - Set and communicate daily mobility goal to care team and patient/family/caregiver     - Collaborate with rehabilitation services on mobility goals if consulted  - Out of bed for toileting  - Record patient progress and toleration of activity level   Outcome: Progressing     Problem: DISCHARGE PLANNING  Goal: Discharge to home or other facility with appropriate resources  Description: INTERVENTIONS:  - Identify barriers to discharge w/patient and caregiver  - Arrange for needed discharge resources and transportation as appropriate  - Identify discharge learning needs (meds, wound care, etc )  - Arrange for interpretive services to assist at discharge as needed  - Refer to Case Management Department for coordinating discharge planning if the patient needs post-hospital services based on physician/advanced practitioner order or complex needs related to functional status, cognitive ability, or social support system  Outcome: Progressing     Problem: Knowledge Deficit  Goal: Patient/family/caregiver demonstrates understanding of disease process, treatment plan, medications, and discharge instructions  Description: Complete learning assessment and assess knowledge base  Interventions:  - Provide teaching at level of understanding  - Provide teaching via preferred learning methods  Outcome: Progressing     Problem: MOBILITY - ADULT  Goal: Maintain or return to baseline ADL function  Description: INTERVENTIONS:  -  Assess patient's ability to carry out ADLs; assess patient's baseline for ADL function and identify physical deficits which impact ability to perform ADLs (bathing, care of mouth/teeth, toileting, grooming, dressing, etc )  - Assess/evaluate cause of self-care deficits   - Assess range of motion  - Assess patient's mobility; develop plan if impaired  - Assess patient's need for assistive devices and provide as appropriate  - Encourage maximum independence but intervene and supervise when necessary  - Involve family in performance of ADLs  - Assess for home care needs following discharge   - Consider OT consult to assist with ADL evaluation and planning for discharge  - Provide patient education as appropriate  Outcome: Progressing  Goal: Maintains/Returns to pre admission functional level  Description: INTERVENTIONS:  - Perform BMAT or MOVE assessment daily    - Set and communicate daily mobility goal to care team and patient/family/caregiver     - Collaborate with rehabilitation services on mobility goals if consulted  - Out of bed for toileting  - Record patient progress and toleration of activity level   Outcome: Progressing     Problem: Prexisting or High Potential for Compromised Skin Integrity  Goal: Skin integrity is maintained or improved  Description: INTERVENTIONS:  - Identify patients at risk for skin breakdown  - Assess and monitor skin integrity  - Assess and monitor nutrition and hydration status  - Monitor labs   - Assess for incontinence   - Turn and reposition patient  - Assist with mobility/ambulation  - Relieve pressure over bony prominences  - Avoid friction and shearing  - Provide appropriate hygiene as needed including keeping skin clean and dry  - Evaluate need for skin moisturizer/barrier cream  - Collaborate with interdisciplinary team   - Patient/family teaching  - Consider wound care consult   Outcome: Progressing     Problem: Potential for Falls  Goal: Patient will remain free of falls  Description: INTERVENTIONS:  - Educate patient/family on patient safety including physical limitations  - Instruct patient to call for assistance with activity   - Consult OT/PT to assist with strengthening/mobility   - Keep Call bell within reach  - Keep bed low and locked with side rails adjusted as appropriate  - Keep care items and personal belongings within reach  - Initiate and maintain comfort rounds  - Make Fall Risk Sign visible to staff  - Offer Toileting every 2 Hours, in advance of need  - Initiate/Maintain alarm  - Obtain necessary fall risk management equipment:   - Apply yellow socks and bracelet for high fall risk patients  - Consider moving patient to room near nurses station  Outcome: Progressing     Problem: Nutrition/Hydration-ADULT  Goal: Nutrient/Hydration intake appropriate for improving, restoring or maintaining nutritional needs  Description: Monitor and assess patient's nutrition/hydration status for malnutrition  Collaborate with interdisciplinary team and initiate plan and interventions as ordered  Monitor patient's weight and dietary intake as ordered or per policy  Utilize nutrition screening tool and intervene as necessary  Determine patient's food preferences and provide high-protein, high-caloric foods as appropriate       INTERVENTIONS:  - Monitor oral intake, urinary output, labs, and treatment plans  - Assess nutrition and hydration status and recommend course of action  - Evaluate amount of meals eaten  - Assist patient with eating if necessary   - Allow adequate time for meals  - Recommend/ encourage appropriate diets, oral nutritional supplements, and vitamin/mineral supplements  - Order, calculate, and assess calorie counts as needed  - Recommend, monitor, and adjust tube feedings and TPN/PPN based on assessed needs  - Assess need for intravenous fluids  - Provide specific nutrition/hydration education as appropriate  - Include patient/family/caregiver in decisions related to nutrition  Outcome: Progressing     Problem: METABOLIC, FLUID AND ELECTROLYTES - ADULT  Goal: Electrolytes maintained within normal limits  Description: INTERVENTIONS:  - Monitor labs and assess patient for signs and symptoms of electrolyte imbalances  - Administer electrolyte replacement as ordered  - Monitor response to electrolyte replacements, including repeat lab results as appropriate  - Instruct patient on fluid and nutrition as appropriate  Outcome: Progressing  Goal: Fluid balance maintained  Description: INTERVENTIONS:  - Monitor labs   - Monitor I/O and WT  - Instruct patient on fluid and nutrition as appropriate  - Assess for signs & symptoms of volume excess or deficit  Outcome: Progressing     Problem: SKIN/TISSUE INTEGRITY - ADULT  Goal: Skin Integrity remains intact(Skin Breakdown Prevention)  Description: Assess:  -Perform Matt assessment every   -Clean and moisturize skin every   -Inspect skin when repositioning, toileting, and assisting with ADLS  -Assess under medical devices such as  every   -Assess extremities for adequate circulation and sensation     Bed Management:  -Have minimal linens on bed & keep smooth, unwrinkled  -Change linens as needed when moist or perspiring  -Avoid sitting or lying in one position for more than  hours while in bed  -Keep HOB at degrees     Toileting:  -Offer bedside commode  -Assess for incontinence every   -Use incontinent care products after each incontinent episode such as     Activity:  -Mobilize patient  times a day  -Encourage activity and walks on unit  -Encourage or provide ROM exercises   -Turn and reposition patient every  Hours  -Use appropriate equipment to lift or move patient in bed  -Instruct/ Assist with weight shifting every  when out of bed in chair  -Consider limitation of chair time  hour intervals    Skin Care:  -Avoid use of baby powder, tape, friction and shearing, hot water or constrictive clothing  -Relieve pressure over bony prominences using   -Do not massage red bony areas    Next Steps:  -Teach patient strategies to minimize risks such as    -Consider consults to  interdisciplinary teams such as   Outcome: Progressing  Goal: Incision(s), wounds(s) or drain site(s) healing without S/S of infection  Description: INTERVENTIONS  - Assess and document dressing, incision, wound bed, drain sites and surrounding tissue  - Provide patient and family education  - Perform skin care/dressing changes ever  Outcome: Progressing     Problem: HEMATOLOGIC - ADULT  Goal: Maintains hematologic stability  Description: INTERVENTIONS  - Assess for signs and symptoms of bleeding or hemorrhage  - Monitor labs  - Administer supportive blood products/factors as ordered and appropriate  Outcome: Progressing     Problem: MUSCULOSKELETAL - ADULT  Goal: Maintain or return mobility to safest level of function  Description: INTERVENTIONS:  - Assess patient's ability to carry out ADLs; assess patient's baseline for ADL function and identify physical deficits which impact ability to perform ADLs (bathing, care of mouth/teeth, toileting, grooming, dressing, etc )  - Assess/evaluate cause of self-care deficits   - Assess range of motion  - Assess patient's mobility  - Assess patient's need for assistive devices and provide as appropriate  - Encourage maximum independence but intervene and supervise when necessary  - Involve family in performance of ADLs  - Assess for home care needs following discharge   - Consider OT consult to assist with ADL evaluation and planning for discharge  - Provide patient education as appropriate  Outcome: Progressing

## 2022-05-17 NOTE — ASSESSMENT & PLAN NOTE
Patient admitted with acute kidney injury felt to be most likely due to post infectious  glomerulonephritis  Patient's renal function is is worse today:  Creatinine increased to 5 08    Will continue monitor renal function closely    I discussed the case with Nephrology who will discuss possible renal biopsy with patient and patient's wife      I am going to hold warfarin

## 2022-05-17 NOTE — PROGRESS NOTES
NEPHROLOGY PROGRESS NOTE   Iva Suarez 71 y o  male MRN: [id number]  Unit/Bed#: -01 Encounter: [id number]  Reason for Consult: JANEL, POA     71 y o  male with HTN, HLD, WILL, PAF, factor 5 Leiden deficiency with hx DVT on coumadin, recent anemia, microscopic hematuria and recent cervical fusion 3/11/22 c/b MRSA SSI, s/p debridement/washout 4/1/22 and JANEL 4/22/2022 suspected due to postinfectious glomerulonephritis presents with worsening JANEL  ASSESSMENT/PLAN:  JANEL (POA):  Suspect related to ATN vs postinfectious glomerulonephritis due to MRSA SSI/OM after cervical fusion  -Admission creatinine 5 04  Today's creatinine essentially unchanged at 5 08  -Peak creatinine 5 5  -baseline creatinine 0 6-0 9  -1st rise in creatinine noted 4/18/2022 as outpatient with sCr 1 88, plateaued in high 3s prior to previous discharge  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 w/no renal biopsy  -workup: UA with 3+ protein, 100 glucose, large blood, innumerable RBC, 10-20 WBC, 2-4 hyaline casts  -previous serologies:  Hepatitis panel, C3/C4, anti double stranded DNA, ANCA, ZAK, GBM ab normal   SPEP/UPEP w/no monoclonal bands   -anti HUGH 2R pending  -Imaging:  Renal ultrasound 4/28/2022 with no hydronephrosis, normally distended bladder  Normal echogenicity  -nonoliguric   UO 2 8L  -Plan:  · Renal function stabilized but creatinine presently stalled  · continuing to hold on renal biopsy due to risk >beneft  · Clinically, patient is not uremic and there is no acute indication for renal replacement therapy (dialysis)  · s/p albumin and Lasix 5/13  · Continue to hold diuretics and monitor off IVF and diuretics  · Check anti HUGH 2R, currently pending  · Strict I/Os, daily weights  · Avoid nephrotoxins, NSAIDs, IV contrast if possible  · Avoid hypotension    Maintain MAP >65  · Trend BMP  · Adjust meds to appropriate GFR  · Optimize hemodynamic status to avoid delay in renal recovery  · Will d/w Dr Chris Peres     Nephrotic range proteinuria:  -suspected due to acute glomerulonephritis  -most recent UPCr 12 on 5/5/22, improved from 18  -serology workup unrevealing as noted above  -continue to monitor     Hematuria:  -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  -hx of microscopic hematuria now with intermittent clots  -INR in therapeutic range  -urology following as outpatient  Consider inpatient consult if gross hematuria persists  -management per primary medical service     Hypertension/Volume status:  -BP suboptimal control persists  -clinically, examines euvolemic  -Home medications:  Amlodipine 5 mg daily, Toprol- mg daily  -Current medications:  Amlodipine 5 mg daily, Toprol  mg daily  Will increase amlodipine to 10 mg daily  -holding further diuretics currently  -Optimize hemodynamic status to avoid delay in renal recovery  -recommend hold parameters on antihypertensive's for SBP <130 mmHg  -Avoid hypotension or fluctuations in blood pressure  Maintain MAP >65  -continue to trend     Hypokalemia:  -K+ 3 3, repleted with 60 mEq po today  Start KDur 20 meq daily in am  -continue to monitor     Anemia:  -Hgb 8 0 and below goal  Goal >10  -no schistocytes  -Iron studies:  Iron 41, ferritin 521, iron saturation 20%, TIBC 204  -previous SPEP/UPEP with no monoclonal bands  -continue to trend  -Transfuse for Hgb <7 0  -management per primary medical service     MRSA surgical site infection w/OM:  -cervical fusion 3/11/2022 c/b MRSA surgical site infection, osteomyelitis  -s/p debridement washout 4/1/2022  -previously on vancomycin--> daptomycin course completed 4/29/2022    continues on doxycycline  -antibiotics per ID  -surgical site management per Neurosurgery     Factor V Leiden deficiency:  -hx associated DVT  -on chronic Coumadin for factor V Leiden and atrial fibrillation  -INR 2 68  -will require heparin bridging if consideration for future renal biopsy     Hx acute diastolic heart failure:  -echo 4/25/22: EF 55% with grade 2 diastolic pseudo normal relaxation  IVC normal  -intermittent Lasix dosing during current and previous hospitalizations  -clinically, exam euvolemic  -continue to monitor off diuretics        SUBJECTIVE:  Patient awake, alert without complaints  Creatinine unchanged at 5  Adequate urine output  VSS     OBJECTIVE:  Current Weight: Weight - Scale: 104 kg (228 lb 9 6 oz)  Vitals:    05/17/22 0121 05/17/22 0800 05/17/22 0803 05/17/22 0900   BP: 151/84 (!) 178/84     BP Location:       Pulse: 61 57     Resp:  18     Temp: 98 2 °F (36 8 °C) 97 7 °F (36 5 °C)     TempSrc:       SpO2: 97% 97%  96%   Weight:   104 kg (228 lb 9 6 oz)        Intake/Output Summary (Last 24 hours) at 5/17/2022 1115  Last data filed at 5/17/2022 1005  Gross per 24 hour   Intake 2037 ml   Output 2275 ml   Net -238 ml     General:  Awake, alert, appears comfortable and in no acute distress  Nontoxic  Skin:  No rash, warm, good skin turgor   Eyes:  PERRL, EOMI, sclerae nonicteric   no periorbital edema   ENT:  Moist mucous membranes  Neck:  Trachea midline, symmetric  No JVD  Chest:  Clear to auscultation bilaterally without wheezes, crackles or rhonchi  CVS:  Regular rate and rhythm without murmur, gallop or rub  S1 and S2 identified and normal   No S3, S4    Abdomen:  Soft, nontender, nondistended without masses  Normal bowel sounds x 4 quadrants  No bruit  Extremities:  Warm, pink, motor and sensory intact and well perfused  No cyanosis, pallor  trace BLE edema unchanged  Chronic venous stasis changes noted bilateral lower extremities  Neuro:  Awake, alert, oriented x3  Grossly intact  Psych:  Appropriate affect  Mentating appropriately    Normal mental status exam    Medications:    Current Facility-Administered Medications:     acetaminophen (TYLENOL) tablet 650 mg, 650 mg, Oral, Q6H PRN, Jonelle Lou PA-C, 650 mg at 05/17/22 0451    amLODIPine (NORVASC) tablet 10 mg, 10 mg, Oral, Daily, Shavonne Silverio PA-C, 10 mg at 05/17/22 3096    docusate sodium (COLACE) capsule 100 mg, 100 mg, Oral, Daily, Jonelle Lou PA-C, 100 mg at 05/17/22 0849    doxycycline hyclate (VIBRAMYCIN) capsule 100 mg, 100 mg, Oral, Q12H [address], Jonelle Lou PA-C, 100 mg at 05/17/22 0848    DULoxetine (CYMBALTA) delayed release capsule 60 mg, 60 mg, Oral, Daily, Jonelle Lou PA-C, 60 mg at 05/17/22 0848    gabapentin (NEURONTIN) capsule 100 mg, 100 mg, Oral, Daily, Jonelle Lou PA-C, 100 mg at 05/17/22 0849    gabapentin (NEURONTIN) capsule 300 mg, 300 mg, Oral, HS, Jonelle Lou PA-C, 300 mg at 05/16/22 2225    lidocaine (LIDODERM) 5 % patch 1 patch, 1 patch, Topical, Daily PRN, Annette Flores PA-C, 1 patch at 05/17/22 0032    methocarbamol (ROBAXIN) tablet 750 mg, 750 mg, Oral, Q6H PRN, Anabel Dakins, PA-C, 750 mg at 05/17/22 0032    metoprolol succinate (TOPROL-XL) 24 hr tablet 100 mg, 100 mg, Oral, Daily, Jonelle Lou PA-C, 100 mg at 05/17/22 0849    ondansetron (ZOFRAN) injection 4 mg, 4 mg, Intravenous, Q4H PRN, Annette Flores PA-C, 4 mg at 05/12/22 2145    pantoprazole (PROTONIX) EC tablet 40 mg, 40 mg, Oral, BID AC, Jonelle Lou PA-C, 40 mg at 05/17/22 0505    potassium chloride (K-DUR,KLOR-CON) CR tablet 20 mEq, 20 mEq, Oral, Daily, Shavonne Silverio PA-C, 20 mEq at 05/17/22 0849    pravastatin (PRAVACHOL) tablet 40 mg, 40 mg, Oral, Daily With Noris Rodriguez PA-C, 40 mg at 05/16/22 1636    tamsulosin (FLOMAX) capsule 0 4 mg, 0 4 mg, Oral, Daily With Noris Rodriguez PA-C, 0 4 mg at 05/16/22 1636    Laboratory Results:  Results from last 7 days   Lab Units 05/17/22  0502 05/16/22  0455 05/15/22  0512 05/14/22  0520 05/13/22  0451 05/12/22  0349 05/11/22  1338 05/11/22  0334 05/10/22  1950 05/10/22  1950 05/10/22  1949   WBC Thousand/uL  --  6 08 5 92 6 35 6 79 6 34  --  5 31  --   --  6 08   HEMOGLOBIN g/dL 8 0* 8 6* 8 0* 7 6* 7 7* 7 6*  --  7 1*  --   --  7 9*   HEMATOCRIT % 26 0* 27 4* 25 4* 24 3* 23 9* 23 8*  --  22 3*  --   --  24 8*   PLATELETS Thousands/uL  --  187 195 195 206 197  --  192  --   --  237   POTASSIUM mmol/L 3 3* 3 4* 3 3* 3 4* 3 3* 3 4* 3 7 3 2*   < > 2 8*  --    CHLORIDE mmol/L 101 104 102 103 103 104 103 102   < > 100  --    CO2 mmol/L 26 27 27 28 27 28 27 26   < > 27  --    BUN mg/dL 43* 46* 48* 47* 42* 39* 36* 35*   < > 35*  --    CREATININE mg/dL 5 08* 4 98* 5 21* 5 39* 5 50* 5 11* 4 99* 5 00*   < > 5 04*  --    CALCIUM mg/dL 8 3 8 8 8 5 8 6 8 7 8 1* 7 9* 8 1*   < > 7 9*  --    MAGNESIUM mg/dL  --   --   --   --   --   --   --   --   --  1 4*  --    PHOSPHORUS mg/dL  --   --   --   --   --   --   --   --   --  5 4*  --     < > = values in this interval not displayed  I have personally reviewed the blood work as stated above and in my note  I have personally reviewed internal Medicine, co-consultants and previous nephrology notes

## 2022-05-17 NOTE — PLAN OF CARE
Problem: PAIN - ADULT  Goal: Verbalizes/displays adequate comfort level or baseline comfort level  Description: Interventions:  - Encourage patient to monitor pain and request assistance  - Assess pain using appropriate pain scale  - Administer analgesics based on type and severity of pain and evaluate response  - Implement non-pharmacological measures as appropriate and evaluate response  - Consider cultural and social influences on pain and pain management  - Notify physician/advanced practitioner if interventions unsuccessful or patient reports new pain  Outcome: Progressing     Problem: INFECTION - ADULT  Goal: Absence or prevention of progression during hospitalization  Description: INTERVENTIONS:  - Assess and monitor for signs and symptoms of infection  - Monitor lab/diagnostic results  - Monitor all insertion sites, i e  indwelling lines, tubes, and drains  - Monitor endotracheal if appropriate and nasal secretions for changes in amount and color  - [place] appropriate cooling/warming therapies per order  - Administer medications as ordered  - Instruct and encourage patient and family to use good hand hygiene technique  - Identify and instruct in appropriate isolation precautions for identified infection/condition  Outcome: Progressing

## 2022-05-17 NOTE — CASE MANAGEMENT
Case Management Discharge Planning Note    Patient name Narinder Marcus  Location [address] [id number] MRN [id number]  : 1950 Date 2022       Current Admission Date: 5/10/2022  Current Admission Diagnosis:JANEL (acute kidney injury) [facility])   Patient Active Problem List    Diagnosis Date Noted    Chronic diastolic (congestive) heart failure ([facility] [address] ) 05/10/2022    Hypokalemia 05/10/2022    Microscopic hematuria 2022    Urinary frequency 2022    Glomerulonephritis     Other proteinuria     Ambulatory dysfunction 2022    [facility]    Great toe pain, right 04/10/2022    Surgical site infection 2022    Status post cervical spinal fusion 2022    Anemia 2022    Head pain cephalgia 2022    Hyponatremia 2022    Muscle spasm 2022    Goals of care, counseling/discussion 2022    Sinus bradycardia 03/10/2022    Cervical myelopathy ([facility] ) 2022    Weakness 2022    Pes anserinus bursitis of right knee 2021    IFG (impaired fasting glucose) 2021    History of DVT of lower extremity 2021    Mixed hyperlipidemia 10/27/2020    Paroxysmal A-fib [facility] ) 2020    Lower extremity edema 2020    Primary osteoarthritis of left knee 2020    Primary osteoarthritis of right knee 2020    Depression, major, single episode, moderate (HCC) 10/30/2017    Insomnia 03/10/2017    Lumbar herniated disc 03/10/2017    Erectile dysfunction 2016    Status post catheter ablation of atrial flutter 2015    Essential hypertension 2015    WILL (obstructive sleep apnea) 2015    Factor V Leiden mutation ([facility] ) 2014      LOS (days): 7  Geometric Mean LOS (GMLOS) (days): 3 10  Days to GMLOS:-3 5     OBJECTIVE:  Risk of Unplanned Readmission Score: 34 03      Current admission status: Inpatient   Preferred Pharmacy:   [address], [address]  [address], [address]  [facility] [address]  Phone: [phone] Fax: [phone]    [id number] [place], [place] - [facility] [facility]  [address] [facility]  [address] [facility] [address]  Phone: [phone] Fax: [phone]    Primary Care Provider: Uriah Batista MD    Primary Insurance: [employer]eba Vega [facility]  Secondary Insurance:     DISCHARGE DETAILS:     IMM Given (Date):: 05/17/22 (Copy provided to patient and media)  IMM Given to[de-identified] Patient

## 2022-05-17 NOTE — PROGRESS NOTES
[place]  Progress Note - Mohit Patino 1950, 71 y o  male MRN: [id number]  Unit/Bed#: MS [facility] Encounter: [id number]  Primary Care Provider: Uriah Batista MD   Date and time admitted to hospital: 5/10/2022  6:51 PM    * JANEL (acute kidney injury) [facility])  Assessment & Plan  Patient admitted with acute kidney injury felt to be most likely due to post infectious  glomerulonephritis  Patient's renal function is is worse today:  Creatinine increased to 5 08    Will continue monitor renal function closely    I discussed the case with Nephrology who will discuss possible renal biopsy with patient and patient's wife  I am going to hold warfarin    Paroxysmal A-fib [facility])  Assessment & Plan  Patient has PAF  He denies palpitations  Patient maintained sinus rhythm    Continue Toprol    INR is 3 43  Will hold warfarin before possible renal biopsy as well as due to hematuria    Gross hematuria  Assessment & Plan  Patient has gross hematuria today  He felt that it was present before  Urinalysis did show hematuria on May 11th  Today patient does have gross hematuria  He has no abdominal pain  Ultrasound of the kidneys and bladder were unrevealing on   I will hold warfarin  Consult urology        VTE Prophylaxis: in place    Patient Centered Rounds: I rounded with patient's nurse    Current Length of Stay: 7 day(s)    Current Patient Status: Inpatient    Certification Statement: Pt requires additional inpatient hospital stay due to: see assessment and plan        Subjective:   Patient complains of hematuria today    Denies abdominal pain, chest pain, shortness of breath    All other ROS are negative    Objective:     Vitals:   Temp (24hrs), Av 8 °F (36 6 °C), Min:97 6 °F (36 4 °C), Max:98 2 °F (36 8 °C)    Temp:  [97 6 °F (36 4 °C)-98 2 °F (36 8 °C)] 97 6 °F (36 4 °C)  HR:  [57-61] 57  Resp:  [18] 18  BP: (147-178)/(72-84) 147/72  SpO2:  [96 %-100 %] 100 %  Body mass index is 31 88 kg/m²  Input and Output Summary (last 24 hours): Intake/Output Summary (Last 24 hours) at 5/17/2022 1918  Last data filed at 5/17/2022 1547  Gross per 24 hour   Intake 2385 ml   Output 2500 ml   Net -115 ml       Physical Exam:     Physical Exam  HENT:      Head: Normocephalic  Eyes:      Conjunctiva/sclera: Conjunctivae normal    Cardiovascular:      Rate and Rhythm: Normal rate and regular rhythm  Heart sounds: Murmur (Systolic murmur was present) heard  Pulmonary:      Effort: No respiratory distress  Breath sounds: No wheezing or rales  Abdominal:      General: Bowel sounds are normal       Palpations: Abdomen is soft  Tenderness: There is no abdominal tenderness  Musculoskeletal:      Cervical back: Neck supple  Skin:     General: Skin is warm and dry  Comments: Patient no significant lower extremity edema   Neurological:      Mental Status: He is alert and oriented to person, place, and time  Cranial Nerves: No cranial nerve deficit  Motor: No weakness ( he moves all extremities on command)  Psychiatric:         Mood and Affect: Mood normal              I personally reviewed labs and imaging reports for today        Last 24 Hours Medication List:   Current Facility-Administered Medications   Medication Dose Route Frequency Provider Last Rate    acetaminophen  650 mg Oral Q6H PRN Andris Curling, PA-C      amLODIPine  10 mg Oral Daily [place]      docusate sodium  100 mg Oral Daily Andris Curling, PA-C      doxycycline hyclate  100 mg Oral Q12H [address] Andris Curling, PA-C      DULoxetine  60 mg Oral Daily Andris Curling, PA-C      gabapentin  100 mg Oral Daily Franciscoris Curling, PA-C      gabapentin  300 mg Oral HS Andris Curling, PA-C      lidocaine  1 patch Topical Daily PRN Jeanne Hassan PA-C      methocarbamol  750 mg Oral Q6H PRN Franciscoris Curling, PA-C      metoprolol succinate  100 mg Oral Daily Jonelle Lou PA-C      ondansetron  4 mg Intravenous Q4H PRN Denise Lagos PA-C      pantoprazole  40 mg Oral BID AC Jonelle Lou PA-C      potassium chloride  20 mEq Oral Daily Shavonne Silverio PA-C      pravastatin  40 mg Oral Daily With Dinner Filemon Jennings PA-C      tamsulosin  0 4 mg Oral Daily With Alana Kapoor PA-C            Today, Patient Was Seen By: Melinda Johnson MD    ** Please Note: Dictation voice to text software may have been used in the creation of this document   **

## 2022-05-18 LAB
ANION GAP SERPL CALCULATED.3IONS-SCNC: 12 MMOL/L (ref 4–13)
BACTERIA UR QL AUTO: ABNORMAL /HPF
BILIRUB UR QL STRIP: NEGATIVE
BUN SERPL-MCNC: 47 MG/DL (ref 5–25)
CALCIUM SERPL-MCNC: 8.7 MG/DL (ref 8.3–10.1)
CHLORIDE SERPL-SCNC: 104 MMOL/L (ref 100–108)
CLARITY UR: ABNORMAL
CO2 SERPL-SCNC: 25 MMOL/L (ref 21–32)
COLOR UR: ABNORMAL
CREAT SERPL-MCNC: 5.15 MG/DL (ref 0.6–1.3)
ERYTHROCYTE [DISTWIDTH] IN BLOOD BY AUTOMATED COUNT: 14.5 % (ref 11.6–15.1)
GFR SERPL CREATININE-BSD FRML MDRD: 10 ML/MIN/1.73SQ M
GLUCOSE SERPL-MCNC: 103 MG/DL (ref 65–140)
GLUCOSE UR STRIP-MCNC: ABNORMAL MG/DL
HCT VFR BLD AUTO: 27.5 % (ref 36.5–49.3)
HGB BLD-MCNC: 8.6 G/DL (ref 12–17)
HGB UR QL STRIP.AUTO: ABNORMAL
INR PPP: 4.02 (ref 0.84–1.19)
KETONES UR STRIP-MCNC: NEGATIVE MG/DL
LEUKOCYTE ESTERASE UR QL STRIP: NEGATIVE
MCH RBC QN AUTO: 28.6 PG (ref 26.8–34.3)
MCHC RBC AUTO-ENTMCNC: 31.3 G/DL (ref 31.4–37.4)
MCV RBC AUTO: 91 FL (ref 82–98)
MUCOUS THREADS UR QL AUTO: ABNORMAL
NITRITE UR QL STRIP: NEGATIVE
NON-SQ EPI CELLS URNS QL MICRO: ABNORMAL /HPF
PH UR STRIP.AUTO: 6.5 [PH]
PLATELET # BLD AUTO: 195 THOUSANDS/UL (ref 149–390)
PMV BLD AUTO: 10.1 FL (ref 8.9–12.7)
POTASSIUM SERPL-SCNC: 3.5 MMOL/L (ref 3.5–5.3)
PROT UR STRIP-MCNC: >=300 MG/DL
PROTHROMBIN TIME: 37.9 SECONDS (ref 11.6–14.5)
RBC # BLD AUTO: 3.01 MILLION/UL (ref 3.88–5.62)
RBC #/AREA URNS AUTO: ABNORMAL /HPF
SODIUM SERPL-SCNC: 141 MMOL/L (ref 136–145)
SP GR UR STRIP.AUTO: 1.02 (ref 1–1.03)
UROBILINOGEN UR QL STRIP.AUTO: 0.2 E.U./DL
WBC # BLD AUTO: 5.08 THOUSAND/UL (ref 4.31–10.16)
WBC #/AREA URNS AUTO: ABNORMAL /HPF

## 2022-05-18 PROCEDURE — 99232 SBSQ HOSP IP/OBS MODERATE 35: CPT | Performed by: PHYSICIAN ASSISTANT

## 2022-05-18 PROCEDURE — 99232 SBSQ HOSP IP/OBS MODERATE 35: CPT | Performed by: INTERNAL MEDICINE

## 2022-05-18 PROCEDURE — 81001 URINALYSIS AUTO W/SCOPE: CPT | Performed by: INTERNAL MEDICINE

## 2022-05-18 PROCEDURE — 85610 PROTHROMBIN TIME: CPT | Performed by: INTERNAL MEDICINE

## 2022-05-18 PROCEDURE — 80048 BASIC METABOLIC PNL TOTAL CA: CPT | Performed by: PHYSICIAN ASSISTANT

## 2022-05-18 PROCEDURE — 85027 COMPLETE CBC AUTOMATED: CPT | Performed by: INTERNAL MEDICINE

## 2022-05-18 RX ADMIN — DULOXETINE 60 MG: 30 CAPSULE, DELAYED RELEASE ORAL at 08:56

## 2022-05-18 RX ADMIN — METHOCARBAMOL TABLETS 750 MG: 500 TABLET, COATED ORAL at 06:03

## 2022-05-18 RX ADMIN — GABAPENTIN 300 MG: 300 CAPSULE ORAL at 21:27

## 2022-05-18 RX ADMIN — AMLODIPINE BESYLATE 10 MG: 5 TABLET ORAL at 08:56

## 2022-05-18 RX ADMIN — DOXYCYCLINE 100 MG: 100 CAPSULE ORAL at 21:27

## 2022-05-18 RX ADMIN — GABAPENTIN 100 MG: 100 CAPSULE ORAL at 08:56

## 2022-05-18 RX ADMIN — POTASSIUM CHLORIDE 20 MEQ: 1500 TABLET, EXTENDED RELEASE ORAL at 08:56

## 2022-05-18 RX ADMIN — DOCUSATE SODIUM 100 MG: 100 CAPSULE, LIQUID FILLED ORAL at 08:56

## 2022-05-18 RX ADMIN — PRAVASTATIN SODIUM 40 MG: 40 TABLET ORAL at 15:51

## 2022-05-18 RX ADMIN — DOXYCYCLINE 100 MG: 100 CAPSULE ORAL at 08:56

## 2022-05-18 RX ADMIN — TAMSULOSIN HYDROCHLORIDE 0.4 MG: 0.4 CAPSULE ORAL at 15:51

## 2022-05-18 RX ADMIN — PANTOPRAZOLE SODIUM 40 MG: 40 TABLET, DELAYED RELEASE ORAL at 06:03

## 2022-05-18 RX ADMIN — PANTOPRAZOLE SODIUM 40 MG: 40 TABLET, DELAYED RELEASE ORAL at 15:51

## 2022-05-18 RX ADMIN — METOPROLOL SUCCINATE 100 MG: 50 TABLET, EXTENDED RELEASE ORAL at 08:56

## 2022-05-18 NOTE — ASSESSMENT & PLAN NOTE
Patient has PAF  He denies palpitations    Patient maintained sinus rhythm    Continue Toprol    INR is 4 02 despite holding warfarin today  Continue holding warfarin before possible kidney biopsy, will consider giving vitamin K

## 2022-05-18 NOTE — PROGRESS NOTES
[place]  Progress Note - Tawana Cox 1950, 71 y o  male MRN: [id number]  Unit/Bed#: -Yaya Encounter: [id number]  Primary Care Provider: Cynthia Portillo MD   Date and time admitted to hospital: 5/10/2022  6:51 PM    * JANEL (acute kidney injury) [facility])  Assessment & Plan  Patient admitted with acute kidney injury felt to be most likely due to post infectious  glomerulonephritis  Patient's renal function is is worse today:  Creatinine increased to 5 15    Patient denies obstructive urinary complaints  Nephrology will discuss renal biopsy with patient and patient's wife today    Paroxysmal A-fib [facility])  Assessment & Plan  Patient has PAF  He denies palpitations  Patient maintained sinus rhythm    Continue Toprol    INR is 4 02 despite holding warfarin today  Continue holding warfarin before possible kidney biopsy, will consider giving vitamin K    Gross hematuria  Assessment & Plan  Patient developed gross hematuria which he had had before  His urine was very slightly pinkish color this morning    I will hold warfarin    Will check patient's UA with microscopy    Factor V Leiden mutation [facility])  Assessment & Plan  Patient has history of left lower extremity DVT after which he was diagnosed with factor 5 Leiden mutation  This happened years ago  VTE Prophylaxis: in place    Patient Centered Rounds: I rounded with patient's nurse    Current Length of Stay: 8 day(s)    Current Patient Status: Inpatient    Certification Statement: Pt requires additional inpatient hospital stay due to: see assessment and plan        Subjective:   Patient's hematuria decreased today  Denies signs of GI bleeding    Denies any chest pain, palpitations, shortness breath, abdominal pain, diarrhea    All other ROS are negative    Objective:     Vitals:   Temp (24hrs), Av 7 °F (36 5 °C), Min:97 6 °F (36 4 °C), Max:97 8 °F (36 6 °C)    Temp:  [97 6 °F (36 4 °C)-97 8 °F (36 6 °C)] 97 7 °F (36 5 °C)  HR:  [54-60] 54  BP: (113-156)/(57-72) 156/72  SpO2:  [96 %-100 %] 96 %  Body mass index is 31 49 kg/m²  Input and Output Summary (last 24 hours): Intake/Output Summary (Last 24 hours) at 5/18/2022 0950  Last data filed at 5/18/2022 0900  Gross per 24 hour   Intake 1980 ml   Output 2450 ml   Net -470 ml       Physical Exam:     Physical Exam  HENT:      Head: Normocephalic  Eyes:      Conjunctiva/sclera: Conjunctivae normal    Cardiovascular:      Rate and Rhythm: Normal rate and regular rhythm  Heart sounds: Murmur (Faint systolic murmur was heard) heard  Pulmonary:      Effort: No respiratory distress  Breath sounds: No wheezing or rales  Abdominal:      General: Bowel sounds are normal       Palpations: Abdomen is soft  Tenderness: There is no abdominal tenderness  Musculoskeletal:      Cervical back: Neck supple  Skin:     General: Skin is warm and dry  Neurological:      Mental Status: He is alert and oriented to person, place, and time  Mental status is at baseline  Cranial Nerves: No cranial nerve deficit  Motor: No weakness  I personally reviewed labs and imaging reports for today        Last 24 Hours Medication List:   Current Facility-Administered Medications   Medication Dose Route Frequency Provider Last Rate    acetaminophen  650 mg Oral Q6H PRN Anny Hayes PA-C      amLODIPine  10 mg Oral Daily Jennyfer Mcneil PA-C      docusate sodium  100 mg Oral Daily Anny Hayes PA-C      doxycycline hyclate  100 mg Oral Q12H [address] Anny Hayes PA-C      DULoxetine  60 mg Oral Daily Anny Hayes PA-C      gabapentin  100 mg Oral Daily Anny Hayes PA-C      gabapentin  300 mg Oral HS Anny Hayes PA-C      lidocaine  1 patch Topical Daily PRN Tae Martinez PA-C      methocarbamol  750 mg Oral Q6H PRN Anny Hayes PA-C      metoprolol succinate  100 mg Oral Daily Anny Hayes PA-C      ondansetron  4 mg Intravenous Q4H PRN Tae Martinez PA-C  pantoprazole  40 mg Oral BID AC Jonelle Lou PA-C      potassium chloride  20 mEq Oral Daily Shavonne Silverio PA-C      pravastatin  40 mg Oral Daily With Dinner Burak Redmond PA-C      tamsulosin  0 4 mg Oral Daily With Andres Kaur PA-C            Today, Patient Was Seen By: Ghazal Márquez MD    ** Please Note: Dictation voice to text software may have been used in the creation of this document   **

## 2022-05-18 NOTE — ASSESSMENT & PLAN NOTE
Patient admitted with acute kidney injury felt to be most likely due to post infectious  glomerulonephritis  Patient's renal function is is worse today:  Creatinine increased to 5 15    Patient denies obstructive urinary complaints      Nephrology will discuss renal biopsy with patient and patient's wife today

## 2022-05-18 NOTE — PROGRESS NOTES
Progress Note - Urology   Iva Suarez 71 y o  male MRN: [id number]  Unit/Bed#: -01 Encounter: [id number]    Assessment:  45-year-old male with hypertension, hyperlipidemia, PAF, factor 5 Leiden deficiency with history of DVT on Coumadin, recent cervical fusion complicated by MRSA SSI status post debridement washout and JANEL I suspected to to post infectious glomerulonephritis presents with worsening JANEL and gross hematuria    Assessment/ Plan:  Gross hematuria  - history of microscopic hematuria due to post infectious glomerulonephritis  - now with intermittent gross hematuria without clots  - supratherapeutic INR, agree with holding Coumadin  - no obstructive symptoms  - no need for Lam placement or CBI  but if patient becomes obstructed with clot burden or goes into retention will need to initiate CBI    JANEL  -renal ultrasound 04/28/2022 with no hydronephrosis, normally distended bladder  - non oliguric  - nephrology following  - to discuss with patient possible renal biopsy  - creatinine still elevated at 5 1      Subjective/Objective   Chief Complaint: hematuria    Subjective: no new complaints    Objective:     Blood pressure 156/72, pulse (!) 54, temperature 97 7 °F (36 5 °C), resp  rate 18, weight 102 kg (225 lb 12 oz), SpO2 96 %  ,Body mass index is 31 49 kg/m²  Intake/Output Summary (Last 24 hours) at 5/18/2022 1356  Last data filed at 5/18/2022 1059  Gross per 24 hour   Intake 1270 ml   Output 2230 ml   Net -960 ml       Invasive Devices  Report    Peripheral Intravenous Line  Duration           Peripheral IV 05/15/22 Dorsal (posterior); Left Forearm 3 days                Physical Exam: /72   Pulse (!) 54   Temp 97 7 °F (36 5 °C)   Resp 18   Wt 102 kg (225 lb 12 oz)   SpO2 96%   BMI 31 49 kg/m²   General appearance: alert and oriented, in no acute distress  Lungs: clear to auscultation bilaterally  Heart: regular rate and rhythm, S1, S2 normal, no murmur, click, rub or gallop  Abdomen: soft, non-tender; bowel sounds normal; no masses,  no organomegaly  Extremities: extremities normal, warm and well-perfused; no cyanosis, clubbing, or edema    Lab, Imaging and other studies:  CBC:   Lab Results   Component Value Date    WBC 5 08 05/18/2022    HGB 8 6 (L) 05/18/2022    HCT 27 5 (L) 05/18/2022    MCV 91 05/18/2022     05/18/2022    MCH 28 6 05/18/2022    MCHC 31 3 (L) 05/18/2022    RDW 14 5 05/18/2022    MPV 10 1 05/18/2022   , CMP:   Lab Results   Component Value Date    SODIUM 141 05/18/2022    K 3 5 05/18/2022     05/18/2022    CO2 25 05/18/2022    BUN 47 (H) 05/18/2022    CREATININE 5 15 (H) 05/18/2022    CALCIUM 8 7 05/18/2022    EGFR 10 05/18/2022   , Coagulation:   Lab Results   Component Value Date    INR 4 02 (H) 05/18/2022   , Urinalysis:   Lab Results   Component Value Date    COLORU Red 05/18/2022    CLARITYU Cloudy 05/18/2022    SPECGRAV 1 020 05/18/2022    PHUR 6 5 05/18/2022    LEUKOCYTESUR Negative 05/18/2022    NITRITE Negative 05/18/2022    GLUCOSEU 250 (1/4%) (A) 05/18/2022    KETONESU Negative 05/18/2022    BILIRUBINUR Negative 05/18/2022    BLOODU Large (A) 05/18/2022     VTE Pharmacologic Prophylaxis: RX contraindicated due to: Supratheruapetic INR  VTE Mechanical Prophylaxis: sequential compression device

## 2022-05-18 NOTE — PLAN OF CARE
Problem: PAIN - ADULT  Goal: Verbalizes/displays adequate comfort level or baseline comfort level  Description: Interventions:  - Encourage patient to monitor pain and request assistance  - Assess pain using appropriate pain scale  - Administer analgesics based on type and severity of pain and evaluate response  - Implement non-pharmacological measures as appropriate and evaluate response  - Consider cultural and social influences on pain and pain management  - Notify physician/advanced practitioner if interventions unsuccessful or patient reports new pain  Outcome: Progressing     Problem: INFECTION - ADULT  Goal: Absence or prevention of progression during hospitalization  Description: INTERVENTIONS:  - Assess and monitor for signs and symptoms of infection  - Monitor lab/diagnostic results  - Monitor all insertion sites, i e  indwelling lines, tubes, and drains  - Monitor endotracheal if appropriate and nasal secretions for changes in amount and color  - [place] appropriate cooling/warming therapies per order  - Administer medications as ordered  - Instruct and encourage patient and family to use good hand hygiene technique  - Identify and instruct in appropriate isolation precautions for identified infection/condition  Outcome: Progressing     Problem: SAFETY ADULT  Goal: Patient will remain free of falls  Description: INTERVENTIONS:  - Educate patient/family on patient safety including physical limitations  - Instruct patient to call for assistance with activity   - Consult OT/PT to assist with strengthening/mobility   - Keep Call bell within reach  - Keep bed low and locked with side rails adjusted as appropriate  - Keep care items and personal belongings within reach  - Initiate and maintain comfort rounds  - Make Fall Risk Sign visible to staff  - Offer Toileting every 2 Hours, in advance of need  - Initiate/Maintain alarm  - Obtain necessary fall risk management equipment:   - Apply yellow socks and bracelet for high fall risk patients  - Consider moving patient to room near nurses station  Outcome: Progressing  Goal: Maintain or return to baseline ADL function  Description: INTERVENTIONS:  -  Assess patient's ability to carry out ADLs; assess patient's baseline for ADL function and identify physical deficits which impact ability to perform ADLs (bathing, care of mouth/teeth, toileting, grooming, dressing, etc )  - Assess/evaluate cause of self-care deficits   - Assess range of motion  - Assess patient's mobility; develop plan if impaired  - Assess patient's need for assistive devices and provide as appropriate  - Encourage maximum independence but intervene and supervise when necessary  - Involve family in performance of ADLs  - Assess for home care needs following discharge   - Consider OT consult to assist with ADL evaluation and planning for discharge  - Provide patient education as appropriate  Outcome: Progressing  Goal: Maintains/Returns to pre admission functional level  Description: INTERVENTIONS:  - Perform BMAT or MOVE assessment daily    - Set and communicate daily mobility goal to care team and patient/family/caregiver     - Collaborate with rehabilitation services on mobility goals if consulted  - Out of bed for toileting  - Record patient progress and toleration of activity level   Outcome: Progressing     Problem: DISCHARGE PLANNING  Goal: Discharge to home or other facility with appropriate resources  Description: INTERVENTIONS:  - Identify barriers to discharge w/patient and caregiver  - Arrange for needed discharge resources and transportation as appropriate  - Identify discharge learning needs (meds, wound care, etc )  - Arrange for interpretive services to assist at discharge as needed  - Refer to Case Management Department for coordinating discharge planning if the patient needs post-hospital services based on physician/advanced practitioner order or complex needs related to functional status, cognitive ability, or social support system  Outcome: Progressing     Problem: Knowledge Deficit  Goal: Patient/family/caregiver demonstrates understanding of disease process, treatment plan, medications, and discharge instructions  Description: Complete learning assessment and assess knowledge base  Interventions:  - Provide teaching at level of understanding  - Provide teaching via preferred learning methods  Outcome: Progressing     Problem: MOBILITY - ADULT  Goal: Maintain or return to baseline ADL function  Description: INTERVENTIONS:  -  Assess patient's ability to carry out ADLs; assess patient's baseline for ADL function and identify physical deficits which impact ability to perform ADLs (bathing, care of mouth/teeth, toileting, grooming, dressing, etc )  - Assess/evaluate cause of self-care deficits   - Assess range of motion  - Assess patient's mobility; develop plan if impaired  - Assess patient's need for assistive devices and provide as appropriate  - Encourage maximum independence but intervene and supervise when necessary  - Involve family in performance of ADLs  - Assess for home care needs following discharge   - Consider OT consult to assist with ADL evaluation and planning for discharge  - Provide patient education as appropriate  Outcome: Progressing  Goal: Maintains/Returns to pre admission functional level  Description: INTERVENTIONS:  - Perform BMAT or MOVE assessment daily    - Set and communicate daily mobility goal to care team and patient/family/caregiver     - Collaborate with rehabilitation services on mobility goals if consulted  - Out of bed for toileting  - Record patient progress and toleration of activity level   Outcome: Progressing     Problem: Prexisting or High Potential for Compromised Skin Integrity  Goal: Skin integrity is maintained or improved  Description: INTERVENTIONS:  - Identify patients at risk for skin breakdown  - Assess and monitor skin integrity  - Assess and monitor nutrition and hydration status  - Monitor labs   - Assess for incontinence   - Turn and reposition patient  - Assist with mobility/ambulation  - Relieve pressure over bony prominences  - Avoid friction and shearing  - Provide appropriate hygiene as needed including keeping skin clean and dry  - Evaluate need for skin moisturizer/barrier cream  - Collaborate with interdisciplinary team   - Patient/family teaching  - Consider wound care consult   Outcome: Progressing     Problem: Potential for Falls  Goal: Patient will remain free of falls  Description: INTERVENTIONS:  - Educate patient/family on patient safety including physical limitations  - Instruct patient to call for assistance with activity   - Consult OT/PT to assist with strengthening/mobility   - Keep Call bell within reach  - Keep bed low and locked with side rails adjusted as appropriate  - Keep care items and personal belongings within reach  - Initiate and maintain comfort rounds  - Make Fall Risk Sign visible to staff  - Offer Toileting every 2 Hours, in advance of need  - Initiate/Maintain alarm  - Obtain necessary fall risk management equipment:   - Apply yellow socks and bracelet for high fall risk patients  - Consider moving patient to room near nurses station  Outcome: Progressing     Problem: Nutrition/Hydration-ADULT  Goal: Nutrient/Hydration intake appropriate for improving, restoring or maintaining nutritional needs  Description: Monitor and assess patient's nutrition/hydration status for malnutrition  Collaborate with interdisciplinary team and initiate plan and interventions as ordered  Monitor patient's weight and dietary intake as ordered or per policy  Utilize nutrition screening tool and intervene as necessary  Determine patient's food preferences and provide high-protein, high-caloric foods as appropriate       INTERVENTIONS:  - Monitor oral intake, urinary output, labs, and treatment plans  - Assess nutrition and hydration status and recommend course of action  - Evaluate amount of meals eaten  - Assist patient with eating if necessary   - Allow adequate time for meals  - Recommend/ encourage appropriate diets, oral nutritional supplements, and vitamin/mineral supplements  - Order, calculate, and assess calorie counts as needed  - Recommend, monitor, and adjust tube feedings and TPN/PPN based on assessed needs  - Assess need for intravenous fluids  - Provide specific nutrition/hydration education as appropriate  - Include patient/family/caregiver in decisions related to nutrition  Outcome: Progressing     Problem: METABOLIC, FLUID AND ELECTROLYTES - ADULT  Goal: Electrolytes maintained within normal limits  Description: INTERVENTIONS:  - Monitor labs and assess patient for signs and symptoms of electrolyte imbalances  - Administer electrolyte replacement as ordered  - Monitor response to electrolyte replacements, including repeat lab results as appropriate  - Instruct patient on fluid and nutrition as appropriate  Outcome: Progressing  Goal: Fluid balance maintained  Description: INTERVENTIONS:  - Monitor labs   - Monitor I/O and WT  - Instruct patient on fluid and nutrition as appropriate  - Assess for signs & symptoms of volume excess or deficit  Outcome: Progressing     Problem: SKIN/TISSUE INTEGRITY - ADULT  Goal: Skin Integrity remains intact(Skin Breakdown Prevention)  Description: Assess:  -Perform Matt assessment every   -Clean and moisturize skin every   -Inspect skin when repositioning, toileting, and assisting with ADLS  -Assess under medical devices such as  every   -Assess extremities for adequate circulation and sensation     Bed Management:  -Have minimal linens on bed & keep smooth, unwrinkled  -Change linens as needed when moist or perspiring  -Avoid sitting or lying in one position for more than  hours while in bed  -Keep HOB at degrees     Toileting:  -Offer bedside commode  -Assess for incontinence every   -Use incontinent care products after each incontinent episode such as     Activity:  -Mobilize patient  times a day  -Encourage activity and walks on unit  -Encourage or provide ROM exercises   -Turn and reposition patient every  Hours  -Use appropriate equipment to lift or move patient in bed  -Instruct/ Assist with weight shifting every  when out of bed in chair  -Consider limitation of chair time  hour intervals    Skin Care:  -Avoid use of baby powder, tape, friction and shearing, hot water or constrictive clothing  -Relieve pressure over bony prominences using   -Do not massage red bony areas    Next Steps:  -Teach patient strategies to minimize risks such as    -Consider consults to  interdisciplinary teams such as   Outcome: Progressing  Goal: Incision(s), wounds(s) or drain site(s) healing without S/S of infection  Description: INTERVENTIONS  - Assess and document dressing, incision, wound bed, drain sites and surrounding tissue  - Provide patient and family education  - Perform skin care/dressing changes ever  Outcome: Progressing     Problem: HEMATOLOGIC - ADULT  Goal: Maintains hematologic stability  Description: INTERVENTIONS  - Assess for signs and symptoms of bleeding or hemorrhage  - Monitor labs  - Administer supportive blood products/factors as ordered and appropriate  Outcome: Progressing     Problem: MUSCULOSKELETAL - ADULT  Goal: Maintain or return mobility to safest level of function  Description: INTERVENTIONS:  - Assess patient's ability to carry out ADLs; assess patient's baseline for ADL function and identify physical deficits which impact ability to perform ADLs (bathing, care of mouth/teeth, toileting, grooming, dressing, etc )  - Assess/evaluate cause of self-care deficits   - Assess range of motion  - Assess patient's mobility  - Assess patient's need for assistive devices and provide as appropriate  - Encourage maximum independence but intervene and supervise when necessary  - Involve family in performance of ADLs  - Assess for home care needs following discharge   - Consider OT consult to assist with ADL evaluation and planning for discharge  - Provide patient education as appropriate  Outcome: Progressing

## 2022-05-18 NOTE — CONSULTS
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   -Nephrology considering renal biopsy to confirm  -Urine clear pink/red without clot this evening    -Recent kidney/bladder US 4/28/22 showed no hydronephrosis or other abdnormalities  -PVR 0cc  -Coumadin currently held due to INR and hematuria  Continue to hold if medically able  -Will continue to follow  Discussed with Urology AP Abiodun Miller, who agreed CBI not necessary at this time unless he develops obstruction or worsening of hematuria    -Follow up outpatient as planned  JANEL  -Cr 5 08, fluctuating during stay  -Baseline 0 6-0 9  -Peak 5 5  -Primary team is trending Cr  -Nephrology on board  Initially thought was due to 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  Now considering renal biopsy to assess further        History of Present Illness   Attending: Sven Thomas MD  Reason for Consult / Principal Problem: hematuria    HPI: Kelley Mcguire is a 71y o  year old male with history of HTN, hyperlipidemia, paroxysmal atrial fibrillation, factor V Leiden mutation w/hx of DVT on coumadin, anemia, recent cervical fusion 1/91/76 complicated by MRSA SSI s/p debridement/washout 4/1/22 and JANEL suspected to be due to postinfectious glomerulonephritis  He presented to the hospital with worsening JANEL and we were consulted for evaluation of ongoing intermittent gross hematuria  He states he has no history of hematuria prior to presumed glomerulonephritis  Hematuria contains clots at times, but has not had any episodes of retention or incomplete emptying   Since his neck surgery he has had urgency and occasional episodes of incontinence or not making it to the urinal  History of mild enlarged prostate  Denies dysuria, fever, chest pain, shortness of breath, flank pain, abdominal pain, or other complaints at this time  He has outpatient appointment set up with [facility] urology at the end of the month  Inpatient consult to Urology  Consult performed by: Maria De Jesus Funk PA-C  Consult ordered by: Holly Ferraro MD          Review of Systems   Constitutional: Negative for chills and fever  Respiratory: Negative for cough and shortness of breath  Cardiovascular: Negative for chest pain  Gastrointestinal: Positive for nausea  Negative for abdominal pain and vomiting  Genitourinary: Positive for hematuria and urgency  Negative for decreased urine volume, difficulty urinating, dysuria, flank pain, frequency and genital sores  All other systems reviewed and are negative  Historical Information   Past Medical History:   Diagnosis Date    Acute venous embolism and thrombosis of deep vessels of proximal lower extremity (HCC)     Last assessed: 5/18/15    Arthritis     Cellulitis     LE    Chronic kidney disease 3/2020    Acute Kidney Injury    CPAP (continuous positive airway pressure) dependence     Factor V Leiden ([address] )     Forgetfulness     Headache(784 0) 3/2022    Never till cervical  spine surgery    Heart murmur 3/2020    Told when in hospital   [facility] (hard of hearing)     Hypoalbuminemia 5/11/2022    Paroxysmal atrial fibrillation (HCC)     Last assessed: 11/2/15    Sleep apnea      Past Surgical History:   Procedure Laterality Date    BACK SURGERY      Lower    COLON SURGERY      COLONOSCOPY      INCISION AND DRAINAGE POSTERIOR SPINE N/A 4/1/2022    Procedure: Posterior cervical evacuation of postoperative collection and debridement with placement of drains C3-T1;   Surgeon: Vijay Harp MD;  [facility];  Service: Neurosurgery    AK ARTHRODESIS ANT INTERBODY MIN DISCECTOMY, CERVICAL BELOW C2 N/A 3/11/2022    Procedure: Anterior cervical discectomy and fixation fusion C5/6 and C6/7; Posterior cervical decompression and instrumented fusion C3-T1; Surgeon: Mahendra Crespo MD;  Location: [facility];  Service: Neurosurgery    SPINE SURGERY  3/11/2022    Cervical myelopathy/ cervical fusion     Social History   Social History     Substance and Sexual Activity   Alcohol Use Not Currently    Alcohol/week: 0 0 standard drinks     @DRUGHX  E-Cigarette/Vaping    E-Cigarette Use Never User      E-Cigarette/Vaping Substances     Social History     Tobacco Use   Smoking Status Never Smoker   Smokeless Tobacco Never Used     Family History: non-contributory    Meds/Allergies   PTA meds:   Prior to Admission Medications   Prescriptions Last Dose Informant Patient Reported? Taking?    DULoxetine (CYMBALTA) 60 mg delayed release capsule 5/10/2022 at Unknown time Self No Yes   Sig: TAKE 1 CAPSULE BY MOUTH  DAILY   acetaminophen (TYLENOL) 325 mg tablet Past Week at Unknown time  No Yes   Sig: Take 3 tablets (975 mg total) by mouth 3 (three) times a day as needed for mild pain   amLODIPine (NORVASC) 5 mg tablet 5/10/2022 at Unknown time  No Yes   Sig: Take 1 tablet (5 mg total) by mouth daily   docusate sodium (COLACE) 100 mg capsule Past Week at Unknown time  No Yes   Sig: Take 1 capsule (100 mg total) by mouth 2 (two) times a day   Patient taking differently: Take 100 mg by mouth in the morning     doxycycline (ADOXA) 100 MG tablet 5/10/2022 at Unknown time  No Yes   Sig: Take 1 tablet (100 mg total) by mouth 2 (two) times a day Take until you follow up [facility] at [facility]) 100 mg tablet 5/10/2022 at Unknown time Self No Yes   Sig: TAKE 1 TABLET BY MOUTH  TWICE DAILY   gabapentin (NEURONTIN) 100 mg capsule 5/10/2022 at Unknown time  No Yes   Sig: Take 1 capsule (100 mg total) by mouth in the morning   gabapentin (NEURONTIN) 300 mg capsule 5/9/2022 at Unknown time  No Yes   Sig: Take 1 capsule (300 mg total) by mouth daily at bedtime   methocarbamol (ROBAXIN) 750 mg tablet 5/9/2022 at Unknown time  No Yes   Sig: Take 1 tablet (750 mg total) by mouth every 6 (six) hours as needed for muscle spasms   metoprolol succinate (TOPROL-XL) 100 mg 24 hr tablet 5/10/2022 at Unknown time  No Yes   Sig: TAKE 1 TABLET BY MOUTH  DAILY   ondansetron (ZOFRAN) 4 mg tablet Past Week at Unknown time  No Yes   Sig: Take 1 tablet (4 mg total) by mouth every 8 (eight) hours as needed for nausea or vomiting   pantoprazole (PROTONIX) 40 mg tablet 5/10/2022 at Unknown time  No Yes   Sig: Take 1 tablet (40 mg total) by mouth 2 (two) times a day before meals   polyethylene glycol (MIRALAX) 17 g packet   No No   Sig: Take 17 g by mouth daily as needed (constipation)   pravastatin (PRAVACHOL) 40 mg tablet 5/10/2022 at Unknown time  No Yes   Sig: TAKE 1 TABLET BY MOUTH  DAILY   tamsulosin (FLOMAX) 0 4 mg 5/10/2022 at Unknown time Self No Yes   Sig: TAKE 1 CAPSULE BY MOUTH  DAILY WITH DINNER   warfarin (COUMADIN) 5 mg tablet Past Week at Unknown time  Yes Yes   Sig: Take by mouth daily Take 2 5 mg (1/2 tablets) daily except Wednesday and Saturday take 5 mg daily  Facility-Administered Medications: None     Allergies   Allergen Reactions    Morphine GI Intolerance    Vitamin K Other (See Comments)     On coumadin-patient sensitive to vitamin K amounts in meds etc        Objective   Vitals: Blood pressure 147/72, pulse 57, temperature 97 6 °F (36 4 °C), resp  rate 18, weight 104 kg (228 lb 9 6 oz), SpO2 100 %  I/O last 24 hours: In: 2385 [P O :2385]  Out: 2700 [Urine:2700]    Invasive Devices  Report    Peripheral Intravenous Line  Duration           Peripheral IV 05/15/22 Dorsal (posterior); Left Forearm 2 days                Physical Exam  Vitals reviewed  Constitutional:       General: He is not in acute distress  Appearance: Normal appearance  He is not ill-appearing, toxic-appearing or diaphoretic  HENT:      Head: Normocephalic and atraumatic  Mouth/Throat:      Mouth: Mucous membranes are moist    Eyes:      Extraocular Movements: Extraocular movements intact  Conjunctiva/sclera: Conjunctivae normal    Cardiovascular:      Rate and Rhythm: Normal rate and regular rhythm  Heart sounds: Murmur heard  No friction rub  No gallop  Pulmonary:      Effort: Pulmonary effort is normal  No respiratory distress  Breath sounds: Normal breath sounds  No wheezing or rales  Abdominal:      General: Abdomen is flat  Bowel sounds are normal       Palpations: Abdomen is soft  Tenderness: There is no abdominal tenderness  There is no guarding  Musculoskeletal:      Right lower leg: Edema present  Left lower leg: Edema present  Skin:     General: Skin is warm and dry  Neurological:      General: No focal deficit present  Mental Status: He is alert and oriented to person, place, and time  Psychiatric:         Mood and Affect: Mood normal          Behavior: Behavior normal          Thought Content: Thought content normal          Judgment: Judgment normal          Lab Results:   I have personally reviewed pertinent reports  CBC:   Lab Results   Component Value Date    HGB 8 0 (L) 05/17/2022    HCT 26 0 (L) 05/17/2022     CMP:   Lab Results   Component Value Date    SODIUM 140 05/17/2022     05/17/2022    CO2 26 05/17/2022    BUN 43 (H) 05/17/2022    CREATININE 5 08 (H) 05/17/2022    CALCIUM 8 3 05/17/2022    EGFR 10 05/17/2022     Urinalysis: No results found for: Ehsan Adithya, SPECGRAV, PHUR, LEUKOCYTESUR, NITRITE, PROTEINUA, GLUCOSEU, KETONESU, BILIRUBINUR, BLOODU  Urine Culture: No results found for: URINECX  Imaging Studies: I have personally reviewed pertinent reports  EKG, Pathology, and Other Studies: I have personally reviewed pertinent reports      VTE Prophylaxis: RX contraindicated due to:  Patient on coumadin, held due to INR and hematuria    Code Status: Level 3 - DNAR and DNI  Advance Directive and Living Will: Yes    Power of :    POLST:

## 2022-05-18 NOTE — PROGRESS NOTES
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 due to MRSA SSI/OM after cervical fusion  -Admission creatinine 5 04   Today's creatinine remains unchanged at 5 1  -Peak creatinine 5 5  -baseline creatinine 0 6-0 9  -1st rise in creatinine noted 4/18/2022 as outpatient with sCr 1 88, plateaued in high 3s prior to previous discharge  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 w/no renal biopsy  -workup: UA with 3+ protein, 100 glucose, large blood, innumerable RBC, 10-20 WBC, 2-4 hyaline casts  -previous serologies:  Hepatitis panel, C3/C4, anti double stranded DNA, ANCA, ZAK, GBM ab normal   SPEP/UPEP w/no monoclonal bands   -anti HUGH 2R pending  -Imaging:  Renal ultrasound 4/28/2022 with no hydronephrosis, normally distended bladder   Normal echogenicity  -nonoliguric   UO 2L  -Plan:  · Renal function stabilize but creatinine presently stalled  · renal biopsy not done previously due to risk >benefit  Will need to consider renal biopsy now due to stalled recovery to r/o other GN etiologies and define prognositic indicators  · Discussed at length with patient and wife on phone  Pts wife will come to hospital this afternoon and plan for group discussion with Dr Ab Houser to discuss renal biopsy  · Coumadin currently being held due to hematuria and for therapeutic INR    INR 4 0 today  · Clinically, patient is not uremic and there is no acute indication for renal replacement therapy (dialysis)  · s/p albumin and Lasix 5/13  · Continue to hold diuretics and monitor off IVF and diuretics  · Check anti HUGH 2R, currently pending  · Strict I/Os, daily weights  · Avoid nephrotoxins, NSAIDs, IV contrast if possible  · Avoid hypotension   Maintain MAP >65  · Trend BMP  · Adjust meds to appropriate GFR  · Optimize hemodynamic status to avoid delay in renal recovery  · D/w Dr Lulú Cha  · Will d/w Dr Mosquera     Nephrotic range proteinuria:  -suspected due to acute glomerulonephritis  -most recent UPCr 12 on 5/5/22, improved from 18  -serology workup unrevealing as noted above  -continue to monitor     Gross Hematuria:  -hx microscopic hematuria 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  -now with intermittent gross hematuria with clots initially in the setting of therapeutic INR  -urology consult appreciated  No CBI presently  -Coumadin being held secondary to hematuria and supratherapeutic INR  INR 4 0 today  -will require heparin bridge if renal biopsy pursued  -management per primary medical service and urology     Hypertension/Volume status:  -BP improved with better control in increase in amlodipine to 10 mg daily  -clinically, examines euvolemic  -Home medications:  Amlodipine 5 mg daily, Toprol- mg daily  -Current medications:  Amlodipine 10 mg daily, Toprol  mg daily  -holding further diuretics  -Optimize hemodynamic status to avoid delay in renal recovery  -recommend hold parameters on antihypertensive's for SBP <130 mmHg  -Avoid hypotension or fluctuations in blood pressure   Maintain MAP >65  -continue to trend     Hypokalemia:  -persistent    Kdur 20 mEq daily started 5/17/2022  -K+ 3 5 today  -continue to monitor     Anemia:  -Hgb 8 0 and below goal  Goal >10  -no schistocytes  -Iron studies:  Iron 41, ferritin 521, iron saturation 20%, TIBC 204  -previous SPEP/UPEP with no monoclonal bands  -continue to trend  -Transfuse for Hgb <7 0  -management per primary medical service     MRSA surgical site infection w/OM:  -cervical fusion 3/11/2022 c/b MRSA surgical site infection, osteomyelitis  -s/p debridement washout 4/1/2022  -previously on vancomycin--> daptomycin course completed 4/29/2022   continues on doxycycline  -antibiotics as per ID previous recommendation and per primary medical service  -surgical site management per Neurosurgery as outpatient     Factor V Leiden deficiency:  -hx associated DVT  -on chronic Coumadin for factor V Leiden and atrial fibrillation  -INR 4 0 and Coumadin held since 5/17/2022  -will require heparin bridging if renal biopsy pursued     Hx acute diastolic heart failure:  -echo 4/25/22: EF 55% with grade 2 diastolic pseudo normal relaxation   IVC normal  -intermittent Lasix dosing during current and previous hospitalizations  -clinically, exam euvolemic  -continue to monitor off diuretics    SUBJECTIVE:  Patient without complaints  No dyspnea, lower extremity edema or uremic symptoms  Creatinine unchanged at 5 1  Adequate urine output  VSS    OBJECTIVE:  Current Weight: Weight - Scale: 102 kg (225 lb 12 oz)  Vitals:    05/17/22 2238 05/18/22 0559 05/18/22 0745 05/18/22 0900   BP: 113/57  156/72    Pulse: 60  (!) 54    Resp:       Temp: 97 8 °F (36 6 °C)  97 7 °F (36 5 °C)    TempSrc:       SpO2: 97%  97% 96%   Weight:  102 kg (225 lb 12 oz)         Intake/Output Summary (Last 24 hours) at 5/18/2022 1133  Last data filed at 5/18/2022 1059  Gross per 24 hour   Intake 1620 ml   Output 2630 ml   Net -1010 ml     General:  Awake, alert, appears comfortable and in no acute distress  Nontoxic  Skin:  No rash, warm, good skin turgor   Eyes:  PERRL, EOMI, sclerae nonicteric   no periorbital edema   ENT:  Moist mucous membranes  Neck:  Trachea midline, symmetric  No JVD  Chest:  Clear to auscultation bilaterally without wheezes, crackles or rhonchi  CVS:  Regular rate and rhythm without murmur, gallop or rub    S1 and S2 identified and normal   No S3, S4    Abdomen:  Soft, nontender, nondistended without masses  Normal bowel sounds x 4 quadrants  No bruit  Extremities:  Warm, pink, motor and sensory intact and well perfused  No cyanosis, pallor  trace BLE edema, unchanged  Chronic venous stasis changes BLE  Shiv Tomas Neuro:  Awake, alert, oriented x3  Grossly intact  Psych:  Appropriate affect  Mentating appropriately    Normal mental status exam      Medications:    Current Facility-Administered Medications:     acetaminophen (TYLENOL) tablet 650 mg, 650 mg, Oral, Q6H PRN, Bala Courtney PA-C, 650 mg at 05/17/22 2114    amLODIPine (NORVASC) tablet 10 mg, 10 mg, Oral, Daily, Shavonne ManeJean Claude, PA-C, 10 mg at 05/18/22 0856    docusate sodium (COLACE) capsule 100 mg, 100 mg, Oral, Daily, Jonelle Lou PA-C, 100 mg at 05/18/22 0856    doxycycline hyclate (VIBRAMYCIN) capsule 100 mg, 100 mg, Oral, Q12H [address], Jonelle Lou PA-C, 100 mg at 05/18/22 0856    DULoxetine (CYMBALTA) delayed release capsule 60 mg, 60 mg, Oral, Daily, Jonelle Lou PA-C, 60 mg at 05/18/22 0856    gabapentin (NEURONTIN) capsule 100 mg, 100 mg, Oral, Daily, Jonelle Lou PA-C, 100 mg at 05/18/22 0856    gabapentin (NEURONTIN) capsule 300 mg, 300 mg, Oral, HS, Jonelle Lou PA-C, 300 mg at 05/17/22 2114    lidocaine (LIDODERM) 5 % patch 1 patch, 1 patch, Topical, Daily PRN, Annette Flores PA-C, 1 patch at 05/17/22 0032    methocarbamol (ROBAXIN) tablet 750 mg, 750 mg, Oral, Q6H PRN, Bala Courtney PA-C, 750 mg at 05/18/22 0603    metoprolol succinate (TOPROL-XL) 24 hr tablet 100 mg, 100 mg, Oral, Daily, Jonelle Lou PA-C, 100 mg at 05/18/22 0856    ondansetron (ZOFRAN) injection 4 mg, 4 mg, Intravenous, Q4H PRN, Annette Flores PA-C, 4 mg at 05/17/22 2001    pantoprazole (PROTONIX) EC tablet 40 mg, 40 mg, Oral, BID AC, Jonelle Lou PA-C, 40 mg at 05/18/22 0603    potassium chloride (K-DUR,KLOR-CON) CR tablet 20 mEq, 20 mEq, Oral, Daily, Shavonne Silverio PA-C, 20 mEq at 05/18/22 0856    pravastatin (PRAVACHOL) tablet 40 mg, 40 mg, Oral, Daily With Camden Zelaya PA-C, 40 mg at 05/17/22 1546    tamsulosin (FLOMAX) capsule 0 4 mg, 0 4 mg, Oral, Daily With Camden Zelaya PA-C, 0 4 mg at 05/17/22 1546    Laboratory Results:  Results from last 7 days   Lab Units 05/18/22  0617 05/17/22  0502 05/16/22  0455 05/15/22  0512 05/14/22  0520 05/13/22  0451 05/12/22  0349   WBC Thousand/uL  --   --  6 08 5 92 6 35 6 79 6 34   HEMOGLOBIN g/dL  --  8 0* 8 6* 8 0* 7 6* 7 7* 7 6*   HEMATOCRIT %  --  26 0* 27 4* 25 4* 24 3* 23 9* 23 8*   PLATELETS Thousands/uL  --   --  187 195 195 206 197   POTASSIUM mmol/L 3 5 3 3* 3 4* 3 3* 3 4* 3 3* 3 4*   CHLORIDE mmol/L 104 101 104 102 103 103 104   CO2 mmol/L 25 26 27 27 28 27 28   BUN mg/dL 47* 43* 46* 48* 47* 42* 39*   CREATININE mg/dL 5 15* 5 08* 4 98* 5 21* 5 39* 5 50* 5 11*   CALCIUM mg/dL 8 7 8 3 8 8 8 5 8 6 8 7 8 1*       I have personally reviewed the blood work as stated above and in my note  I have personally reviewed internal Medicine, co-consultants and previous nephrology notes

## 2022-05-18 NOTE — ASSESSMENT & PLAN NOTE
Patient has history of left lower extremity DVT after which he was diagnosed with factor 5 Leiden mutation  This happened years ago  No

## 2022-05-18 NOTE — ASSESSMENT & PLAN NOTE
Patient developed gross hematuria which he had had before        His urine was very slightly pinkish color this morning    I will hold warfarin    Will check patient's UA with microscopy

## 2022-05-19 ENCOUNTER — APPOINTMENT (INPATIENT)
Dept: RADIOLOGY | Facility: HOSPITAL | Age: 72
DRG: 674 | End: 2022-05-19
Payer: COMMERCIAL

## 2022-05-19 LAB
ANION GAP SERPL CALCULATED.3IONS-SCNC: 10 MMOL/L (ref 4–13)
BUN SERPL-MCNC: 47 MG/DL (ref 5–25)
CALCIUM SERPL-MCNC: 8.9 MG/DL (ref 8.3–10.1)
CHLORIDE SERPL-SCNC: 103 MMOL/L (ref 100–108)
CO2 SERPL-SCNC: 27 MMOL/L (ref 21–32)
CREAT SERPL-MCNC: 5.11 MG/DL (ref 0.6–1.3)
ERYTHROCYTE [DISTWIDTH] IN BLOOD BY AUTOMATED COUNT: 14.2 % (ref 11.6–15.1)
GFR SERPL CREATININE-BSD FRML MDRD: 10 ML/MIN/1.73SQ M
GLUCOSE SERPL-MCNC: 95 MG/DL (ref 65–140)
HCT VFR BLD AUTO: 27.3 % (ref 36.5–49.3)
HGB BLD-MCNC: 8.4 G/DL (ref 12–17)
INR PPP: 3.56 (ref 0.84–1.19)
MCH RBC QN AUTO: 28.2 PG (ref 26.8–34.3)
MCHC RBC AUTO-ENTMCNC: 30.8 G/DL (ref 31.4–37.4)
MCV RBC AUTO: 92 FL (ref 82–98)
PLATELET # BLD AUTO: 195 THOUSANDS/UL (ref 149–390)
PMV BLD AUTO: 10.5 FL (ref 8.9–12.7)
POTASSIUM SERPL-SCNC: 3.3 MMOL/L (ref 3.5–5.3)
PROTHROMBIN TIME: 34.5 SECONDS (ref 11.6–14.5)
RBC # BLD AUTO: 2.98 MILLION/UL (ref 3.88–5.62)
SODIUM SERPL-SCNC: 140 MMOL/L (ref 136–145)
WBC # BLD AUTO: 5.92 THOUSAND/UL (ref 4.31–10.16)

## 2022-05-19 PROCEDURE — 99232 SBSQ HOSP IP/OBS MODERATE 35: CPT | Performed by: INTERNAL MEDICINE

## 2022-05-19 PROCEDURE — 85610 PROTHROMBIN TIME: CPT | Performed by: PHYSICIAN ASSISTANT

## 2022-05-19 PROCEDURE — 99232 SBSQ HOSP IP/OBS MODERATE 35: CPT | Performed by: PHYSICIAN ASSISTANT

## 2022-05-19 PROCEDURE — 72040 X-RAY EXAM NECK SPINE 2-3 VW: CPT

## 2022-05-19 PROCEDURE — 94760 N-INVAS EAR/PLS OXIMETRY 1: CPT

## 2022-05-19 PROCEDURE — 80048 BASIC METABOLIC PNL TOTAL CA: CPT | Performed by: PHYSICIAN ASSISTANT

## 2022-05-19 PROCEDURE — 85027 COMPLETE CBC AUTOMATED: CPT | Performed by: INTERNAL MEDICINE

## 2022-05-19 PROCEDURE — 99233 SBSQ HOSP IP/OBS HIGH 50: CPT | Performed by: INTERNAL MEDICINE

## 2022-05-19 RX ORDER — POTASSIUM CHLORIDE 20 MEQ/1
20 TABLET, EXTENDED RELEASE ORAL 2 TIMES DAILY
Status: DISCONTINUED | OUTPATIENT
Start: 2022-05-19 | End: 2022-05-22

## 2022-05-19 RX ORDER — POTASSIUM CHLORIDE 20 MEQ/1
20 TABLET, EXTENDED RELEASE ORAL ONCE
Status: COMPLETED | OUTPATIENT
Start: 2022-05-19 | End: 2022-05-19

## 2022-05-19 RX ADMIN — METHOCARBAMOL TABLETS 750 MG: 500 TABLET, COATED ORAL at 21:08

## 2022-05-19 RX ADMIN — POTASSIUM CHLORIDE 20 MEQ: 1500 TABLET, EXTENDED RELEASE ORAL at 11:08

## 2022-05-19 RX ADMIN — PANTOPRAZOLE SODIUM 40 MG: 40 TABLET, DELAYED RELEASE ORAL at 16:29

## 2022-05-19 RX ADMIN — GABAPENTIN 100 MG: 100 CAPSULE ORAL at 10:01

## 2022-05-19 RX ADMIN — ONDANSETRON 4 MG: 2 INJECTION INTRAMUSCULAR; INTRAVENOUS at 16:34

## 2022-05-19 RX ADMIN — DOXYCYCLINE 100 MG: 100 CAPSULE ORAL at 10:01

## 2022-05-19 RX ADMIN — METOPROLOL SUCCINATE 100 MG: 50 TABLET, EXTENDED RELEASE ORAL at 10:04

## 2022-05-19 RX ADMIN — PRAVASTATIN SODIUM 40 MG: 40 TABLET ORAL at 16:30

## 2022-05-19 RX ADMIN — DOXYCYCLINE 100 MG: 100 CAPSULE ORAL at 21:09

## 2022-05-19 RX ADMIN — DULOXETINE 60 MG: 30 CAPSULE, DELAYED RELEASE ORAL at 10:01

## 2022-05-19 RX ADMIN — PANTOPRAZOLE SODIUM 40 MG: 40 TABLET, DELAYED RELEASE ORAL at 05:23

## 2022-05-19 RX ADMIN — POTASSIUM CHLORIDE 20 MEQ: 1500 TABLET, EXTENDED RELEASE ORAL at 17:59

## 2022-05-19 RX ADMIN — AMLODIPINE BESYLATE 10 MG: 5 TABLET ORAL at 10:01

## 2022-05-19 RX ADMIN — DOCUSATE SODIUM 100 MG: 100 CAPSULE, LIQUID FILLED ORAL at 10:01

## 2022-05-19 RX ADMIN — POTASSIUM CHLORIDE 20 MEQ: 1500 TABLET, EXTENDED RELEASE ORAL at 10:01

## 2022-05-19 RX ADMIN — GABAPENTIN 300 MG: 300 CAPSULE ORAL at 21:09

## 2022-05-19 RX ADMIN — TAMSULOSIN HYDROCHLORIDE 0.4 MG: 0.4 CAPSULE ORAL at 16:30

## 2022-05-19 NOTE — PLAN OF CARE
Problem: PAIN - ADULT  Goal: Verbalizes/displays adequate comfort level or baseline comfort level  Description: Interventions:  - Encourage patient to monitor pain and request assistance  - Assess pain using appropriate pain scale  - Administer analgesics based on type and severity of pain and evaluate response  - Implement non-pharmacological measures as appropriate and evaluate response  - Consider cultural and social influences on pain and pain management  - Notify physician/advanced practitioner if interventions unsuccessful or patient reports new pain  Outcome: Progressing     Problem: INFECTION - ADULT  Goal: Absence or prevention of progression during hospitalization  Description: INTERVENTIONS:  - Assess and monitor for signs and symptoms of infection  - Monitor lab/diagnostic results  - Monitor all insertion sites, i e  indwelling lines, tubes, and drains  - Monitor endotracheal if appropriate and nasal secretions for changes in amount and color  - [place] appropriate cooling/warming therapies per order  - Administer medications as ordered  - Instruct and encourage patient and family to use good hand hygiene technique  - Identify and instruct in appropriate isolation precautions for identified infection/condition  Outcome: Progressing     Problem: SAFETY ADULT  Goal: Patient will remain free of falls  Description: INTERVENTIONS:  - Educate patient/family on patient safety including physical limitations  - Instruct patient to call for assistance with activity   - Consult OT/PT to assist with strengthening/mobility   - Keep Call bell within reach  - Keep bed low and locked with side rails adjusted as appropriate  - Keep care items and personal belongings within reach  - Initiate and maintain comfort rounds  - Make Fall Risk Sign visible to staff  - Offer Toileting every 2 Hours, in advance of need  - Initiate/Maintain alarm  - Obtain necessary fall risk management equipment:   - Apply yellow socks and bracelet for high fall risk patients  - Consider moving patient to room near nurses station  Outcome: Progressing  Goal: Maintain or return to baseline ADL function  Description: INTERVENTIONS:  -  Assess patient's ability to carry out ADLs; assess patient's baseline for ADL function and identify physical deficits which impact ability to perform ADLs (bathing, care of mouth/teeth, toileting, grooming, dressing, etc )  - Assess/evaluate cause of self-care deficits   - Assess range of motion  - Assess patient's mobility; develop plan if impaired  - Assess patient's need for assistive devices and provide as appropriate  - Encourage maximum independence but intervene and supervise when necessary  - Involve family in performance of ADLs  - Assess for home care needs following discharge   - Consider OT consult to assist with ADL evaluation and planning for discharge  - Provide patient education as appropriate  Outcome: Progressing  Goal: Maintains/Returns to pre admission functional level  Description: INTERVENTIONS:  - Perform BMAT or MOVE assessment daily    - Set and communicate daily mobility goal to care team and patient/family/caregiver     - Collaborate with rehabilitation services on mobility goals if consulted  - Out of bed for toileting  - Record patient progress and toleration of activity level   Outcome: Progressing     Problem: DISCHARGE PLANNING  Goal: Discharge to home or other facility with appropriate resources  Description: INTERVENTIONS:  - Identify barriers to discharge w/patient and caregiver  - Arrange for needed discharge resources and transportation as appropriate  - Identify discharge learning needs (meds, wound care, etc )  - Arrange for interpretive services to assist at discharge as needed  - Refer to Case Management Department for coordinating discharge planning if the patient needs post-hospital services based on physician/advanced practitioner order or complex needs related to functional status, cognitive ability, or social support system  Outcome: Progressing     Problem: Knowledge Deficit  Goal: Patient/family/caregiver demonstrates understanding of disease process, treatment plan, medications, and discharge instructions  Description: Complete learning assessment and assess knowledge base    Interventions:  - Provide teaching at level of understanding  - Provide teaching via preferred learning methods  Outcome: Progressing     Problem: HEMATOLOGIC - ADULT  Goal: Maintains hematologic stability  Description: INTERVENTIONS  - Assess for signs and symptoms of bleeding or hemorrhage  - Monitor labs  - Administer supportive blood products/factors as ordered and appropriate  Outcome: Progressing     Problem: MUSCULOSKELETAL - ADULT  Goal: Maintain or return mobility to safest level of function  Description: INTERVENTIONS:  - Assess patient's ability to carry out ADLs; assess patient's baseline for ADL function and identify physical deficits which impact ability to perform ADLs (bathing, care of mouth/teeth, toileting, grooming, dressing, etc )  - Assess/evaluate cause of self-care deficits   - Assess range of motion  - Assess patient's mobility  - Assess patient's need for assistive devices and provide as appropriate  - Encourage maximum independence but intervene and supervise when necessary  - Involve family in performance of ADLs  - Assess for home care needs following discharge   - Consider OT consult to assist with ADL evaluation and planning for discharge  - Provide patient education as appropriate  Outcome: Progressing

## 2022-05-19 NOTE — PLAN OF CARE
Problem: PAIN - ADULT  Goal: Verbalizes/displays adequate comfort level or baseline comfort level  Description: Interventions:  - Encourage patient to monitor pain and request assistance  - Assess pain using appropriate pain scale  - Administer analgesics based on type and severity of pain and evaluate response  - Implement non-pharmacological measures as appropriate and evaluate response  - Consider cultural and social influences on pain and pain management  - Notify physician/advanced practitioner if interventions unsuccessful or patient reports new pain  Outcome: Progressing     Problem: INFECTION - ADULT  Goal: Absence or prevention of progression during hospitalization  Description: INTERVENTIONS:  - Assess and monitor for signs and symptoms of infection  - Monitor lab/diagnostic results  - Monitor all insertion sites, i e  indwelling lines, tubes, and drains  - Monitor endotracheal if appropriate and nasal secretions for changes in amount and color  - [place] appropriate cooling/warming therapies per order  - Administer medications as ordered  - Instruct and encourage patient and family to use good hand hygiene technique  - Identify and instruct in appropriate isolation precautions for identified infection/condition  Outcome: Progressing

## 2022-05-19 NOTE — ASSESSMENT & PLAN NOTE
Wt Readings from Last 3 Encounters:   05/19/22 102 kg (225 lb 1 4 oz)   05/03/22 114 kg (251 lb)   04/29/22 113 kg (250 lb 1 6 oz)     · Prior echo 04/25/2022:  EF 10%, Diastolic function: grade 2 pseudonormal relaxation  · Monitor I/O and daily weights  · Does not take outpatient diuretics  IV Lasix with minimal results during prior admission    Patient had received albumin to help with anasarca  · Fluid restriction, 2 g sodium

## 2022-05-19 NOTE — PROGRESS NOTES
[place]  Progress Note - Frandy Hickey 1950, 71 y o  male MRN: [id number]  Unit/Bed#: -Yaya Encounter: [id number]  Primary Care Provider: Corey Singh MD   Date and time admitted to hospital: 5/10/2022  6:51 PM    * JANEL (acute kidney injury) [facility])  Assessment & Plan  Patient admitted with acute kidney injury felt to be most likely due to post infectious  glomerulonephritis  Likely plan is for renal biopsy on Monday, continue to hold Coumadin, monitor daily labs    Gross hematuria  Assessment & Plan  Patient developed gross hematuria which he had had before  Continue to hold Coumadin    Chronic diastolic (congestive) heart failure (HCC)  Assessment & Plan  Wt Readings from Last 3 Encounters:   05/19/22 102 kg (225 lb 1 4 oz)   05/03/22 114 kg (251 lb)   04/29/22 113 kg (250 lb 1 6 oz)     · Prior echo 04/25/2022:  EF 96%, Diastolic function: grade 2 pseudonormal relaxation  · Monitor I/O and daily weights  · Does not take outpatient diuretics  IV Lasix with minimal results during prior admission  Patient had received albumin to help with anasarca  · Fluid restriction, 2 g sodium    Glomerulonephritis  Assessment & Plan  · Diagnosed during prior admission  · Biopsy was not performed during prior admit  · Postinfectious suspected  · Treatment per nephrology     Surgical site infection  Assessment & Plan  · S/p cervical fusion performed by Dr Rhiannon Rae on 4/29/68, complicated by MRSA infection  · Completed 24 days of IV vancomycin, transitioned to daptomycin on 04/24-4/29  Discharged home on doxycycline 100 mg b i d    · Continue doxycycline  · apprec ID, not felt to be contributing to PIGN     Depression, major, single episode, moderate (HCC)  Assessment & Plan  · Continue Cymbalta    Factor V Leiden mutation [facility])  Assessment & Plan  Patient has history of left lower extremity DVT after which he was diagnosed with factor 5 Leiden mutation    This happened years ago         Code Status: Level 3 - DNAR and DNI    Subjective:   Patient reported some right great toe pain, pain is worse with ambulation, seems to be slightly better today than yesterday    Objective:     Vitals:   Temp (24hrs), Av 7 °F (36 5 °C), Min:97 7 °F (36 5 °C), Max:97 7 °F (36 5 °C)    Temp:  [97 7 °F (36 5 °C)] 97 7 °F (36 5 °C)  HR:  [60] 60  BP: (147-171)/(66-79) 171/79  SpO2:  [96 %-99 %] 98 %  Body mass index is 31 39 kg/m²  Input and Output Summary (last 24 hours): Intake/Output Summary (Last 24 hours) at 2022 1657  Last data filed at 2022 1237  Gross per 24 hour   Intake 250 ml   Output 2450 ml   Net -2200 ml       Physical Exam:   Physical Exam  Vitals and nursing note reviewed  Constitutional:       General: He is not in acute distress  Appearance: Normal appearance  He is not ill-appearing, toxic-appearing or diaphoretic  HENT:      Head: Normocephalic and atraumatic  Cardiovascular:      Rate and Rhythm: Normal rate  Pulses: Normal pulses  Pulmonary:      Effort: Pulmonary effort is normal    Musculoskeletal:         General: Normal range of motion  Cervical back: Normal range of motion  Skin:     General: Skin is warm  Neurological:      General: No focal deficit present  Mental Status: He is alert and oriented to person, place, and time  Mental status is at baseline  Cranial Nerves: No cranial nerve deficit  Psychiatric:         Mood and Affect: Mood normal          Behavior: Behavior normal          Thought Content:  Thought content normal          Judgment: Judgment normal           Additional Data:     Labs:  Results from last 7 days   Lab Units 22  0519 22  0502 22  0455   WBC Thousand/uL 5 92   < > 6 08   HEMOGLOBIN g/dL 8 4*   < > 8 6*   HEMATOCRIT % 27 3*   < > 27 4*   PLATELETS Thousands/uL 195   < > 187   NEUTROS PCT %  --   --  53   LYMPHS PCT %  --   --  31   MONOS PCT %  --   --  10   EOS PCT %  --   -- 5    < > = values in this interval not displayed  Results from last 7 days   Lab Units 05/19/22  0519 05/16/22  0455 05/15/22  0512   SODIUM mmol/L 140   < > 139   POTASSIUM mmol/L 3 3*   < > 3 3*   CHLORIDE mmol/L 103   < > 102   CO2 mmol/L 27   < > 27   BUN mg/dL 47*   < > 48*   CREATININE mg/dL 5 11*   < > 5 21*   ANION GAP mmol/L 10   < > 10   CALCIUM mg/dL 8 9   < > 8 5   ALBUMIN g/dL  --   --  2 1*   TOTAL BILIRUBIN mg/dL  --   --  0 50   ALK PHOS U/L  --   --  86   ALT U/L  --   --  8*   AST U/L  --   --  15   GLUCOSE RANDOM mg/dL 95   < > 99    < > = values in this interval not displayed  Results from last 7 days   Lab Units 05/19/22  0519   INR  3 56*                   Lines/Drains:  Invasive Devices  Report    Peripheral Intravenous Line  Duration           Peripheral IV 05/19/22 Distal;Right;Upper;Ventral (anterior) Arm <1 day                      Imaging: No pertinent imaging reviewed      Recent Cultures (last 7 days):         Last 24 Hours Medication List:   Current Facility-Administered Medications   Medication Dose Route Frequency Provider Last Rate    acetaminophen  650 mg Oral Q6H PRN Abbiderella FABIOLA Steven      amLODIPine  10 mg Oral Daily Maulik Resendiz PA-C      docusate sodium  100 mg Oral Daily Cinderella GeFABIOLA vicente      doxycycline hyclate  100 mg Oral Q12H [address] Cinderella FABIOLA Steven      DULoxetine  60 mg Oral Daily Abbiderella FABIOLA Steven      gabapentin  100 mg Oral Daily Cinderella GeFABIOLA vicente      gabapentin  300 mg Oral HS Cristal Steven PA-C      lidocaine  1 patch Topical Daily PRN Renae Crouch PA-C      methocarbamol  750 mg Oral Q6H PRN Abbiderella GeFABIOLA vicente      metoprolol succinate  100 mg Oral Daily Cinderella GeFABIOLA vicente      ondansetron  4 mg Intravenous Q4H PRN Annette Flores PA-C      pantoprazole  40 mg Oral BID AC Jonelle Lou PA-C      potassium chloride  20 mEq Oral BID Maulik Resendiz PA-C      pravastatin  40 mg Oral Daily With Dinner Cristal Steven PA-C      tamsulosin  0 4 mg Oral Daily With Eva aRmirez PA-C          Today, Patient Was Seen By: Matt Sheets DO    **Please Note: This note may have been constructed using a voice recognition system  **

## 2022-05-19 NOTE — TELEPHONE ENCOUNTER
Received a call from Alison Ngueyn regarding [facility] upcoming appt  She states Chiara Lares is currently admitted for kidney issues and will not likely be discharged before his appt on 5/24  She is working on getting xray of his neck while admitted  Once complete I will route a message to Dr May Narvaez for input and see if he is comfortable clearing him from his collar while admitted  She was appreciative

## 2022-05-19 NOTE — ASSESSMENT & PLAN NOTE
Patient admitted with acute kidney injury felt to be most likely due to post infectious  glomerulonephritis        Likely plan is for renal biopsy on Monday, continue to hold Coumadin, monitor daily labs

## 2022-05-19 NOTE — ASSESSMENT & PLAN NOTE
· S/p cervical fusion performed by Dr Ebony Mendoza on 0/91/67, complicated by MRSA infection  · Completed 24 days of IV vancomycin, transitioned to daptomycin on 04/24-4/29    Discharged home on doxycycline 100 mg b i d    · Continue doxycycline  · apprec ID, not felt to be contributing to PIGN

## 2022-05-19 NOTE — PROGRESS NOTES
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 presents with worsening JANEL      ASSESSMENT/PLAN:  JANEL (POA):  Suspect related to ATN vs postinfectious glomerulonephritis due to MRSA SSI/OM after cervical fusion  -Admission creatinine 5 04   Today's creatinine remains unchanged at 5 1  -Peak creatinine 5 5  -baseline creatinine 0 6-0 9  -1st rise in creatinine noted 4/18/2022 as outpatient with sCr 1 88, plateaued in high 3s prior to previous discharge   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 w/no renal biopsy  -workup: UA with 3+ protein, 100 glucose, large blood, innumerable RBC, 10-20 WBC, 2-4 hyaline casts  -previous serologies:  Hepatitis panel, C3/C4, anti double stranded DNA, ANCA, ZAK, GBM ab normal   SPEP/UPEP w/no monoclonal bands   -anti HUGH 2R pending  To be collected today  -Imaging:  Renal ultrasound 4/28/2022 with no hydronephrosis, normally distended bladder   Normal echogenicity  -nonoliguric   UO 2 2 L  -Plan:  · Renal function stabilized but creatinine presently stalled  · renal biopsy not done previously due to risk >benefit  However, benefits now outweighs risk given clinical course  Discussion as outlined 5/18/22 by Dr Tigre Parra  Current plan for renal biopsy on 5/23/22 when INR appropriate  · Discussed at length with patient and son at bedside again  All questions answered  Patient agrees to proceed on Monday as planned unless renal recovery  · Coumadin currently being held due to hematuria and supratherapeutic INR  INR 3 56 today  Continue to hold in anticipation of renal biopsy    Will require heparin bridge when INR subtherapeutic  · Clinically, patient is not uremic and there is no acute indication for renal replacement therapy (dialysis)  · s/p albumin and Lasix 5/13  · Continue to hold diuretics and monitor off IVF and diuretics  · Check anti HUGH 2R, currently pending  · Strict I/Os, daily weights  · Avoid nephrotoxins, NSAIDs, IV contrast if possible  · Avoid hypotension   Maintain MAP >65  · Trend BMP  · Adjust meds to appropriate GFR  · Optimize hemodynamic status to avoid delay in renal recovery  · d/w Dr Mosquera     Nephrotic range proteinuria:  -suspected due to acute glomerulonephritis  -most recent UPCr 12 on 5/5/22, improved from 18  -serology workup unrevealing as noted above  -continue to monitor     Gross Hematuria:  -hx microscopic hematuria 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  -now with intermittent gross hematuria with clots initially in the setting of therapeutic INR  -urology consult appreciated  No CBI presently  -Coumadin being held secondary to hematuria and supratherapeutic INR  INR 3 5 today  -will require heparin bridge for renal biopsy  -management per primary medical service and urology     Hypertension/Volume status:  -BP improved with better control with increase in amlodipine to 10 mg daily  -clinically, examines euvolemic  -Home medications:  Amlodipine 5 mg daily, Toprol- mg daily  -Current medications:  Amlodipine 10 mg daily, Toprol  mg daily    -holding further diuretics  -Optimize hemodynamic status to avoid delay in renal recovery  -recommend hold parameters on antihypertensive's for SBP <130 mmHg  -Avoid hypotension or fluctuations in blood pressure   Maintain MAP >65  -continue to trend     Hypokalemia:  -persistent  Kdur 20 mEq daily started 5/17/2022  -K+ 3 3 today  Will increase KDur to 20 meq BID    Give additional 20 mEq now  -continue to monitor     Anemia:  -Hgb 8 4 and below goal  Goal >10  -no schistocytes  -Iron studies:  Iron 41, ferritin 521, iron saturation 20%, TIBC 204  -previous SPEP/UPEP with no monoclonal bands  -continue to trend  -Transfuse for Hgb <7 0  -management per primary medical service     MRSA surgical site infection w/OM:  -cervical fusion 3/11/2022 c/b MRSA surgical site infection, osteomyelitis  -s/p debridement washout 4/1/2022  -previously on vancomycin--> daptomycin course completed 4/29/2022   continues on doxycycline  -antibiotics as per ID previous recommendation and per primary medical service  -surgical site management per Neurosurgery as outpatient     Factor V Leiden deficiency:  -hx associated DVT  -on chronic Coumadin for factor V Leiden and atrial fibrillation  -INR 4 0 and Coumadin held since 5/17/2022  -will require heparin bridging if renal biopsy pursued     Hx acute diastolic heart failure:  -echo 4/25/22: EF 55% with grade 2 diastolic pseudo normal relaxation   IVC normal  -intermittent Lasix dosing during current and previous hospitalizations  -clinically, exam euvolemic  -continue to monitor off diuretics    SUBJECTIVE:  Patient awake, alert without complaints  Creatinine unchanged at 5 1  Urine output adequate  VSS    OBJECTIVE:  Current Weight: Weight - Scale: 102 kg (225 lb 1 4 oz)  Vitals:    05/18/22 2115 05/19/22 0500 05/19/22 0600 05/19/22 0817   BP: 147/66   (!) 171/79   Pulse: 60   60   Resp:       Temp: 97 7 °F (36 5 °C)   97 7 °F (36 5 °C)   TempSrc:       SpO2: 99%   98%   Weight:  102 kg (225 lb 1 4 oz) 102 kg (225 lb 1 4 oz)        Intake/Output Summary (Last 24 hours) at 5/19/2022 1025  Last data filed at 5/19/2022 0815  Gross per 24 hour   Intake 610 ml   Output 2745 ml   Net -2135 ml     General:  Awake, alert, appears comfortable and in no acute distress  Nontoxic  Skin:  No rash, warm, good skin turgor   Eyes:  PERRL, EOMI, sclerae nonicteric   no periorbital edema   ENT:  Moist mucous membranes  Neck:  Trachea midline, symmetric  No JVD    Chest:  Clear to auscultation bilaterally without wheezes, crackles or rhonchi  CVS:  Regular rate and rhythm without murmur, gallop or rub  S1 and S2 identified and normal   No S3, S4    Abdomen:  Soft, nontender, nondistended without masses  Normal bowel sounds x 4 quadrants  No bruit  Extremities:  Warm, pink, motor and sensory intact and well perfused  No cyanosis, pallor  trace BLE edema  Chronic venous stasis changes lower extremity  Neuro:  Awake, alert, oriented x3  Grossly intact  Psych:  Appropriate affect  Mentating appropriately    Normal mental status exam    Medications:    Current Facility-Administered Medications:     acetaminophen (TYLENOL) tablet 650 mg, 650 mg, Oral, Q6H PRN, Thee Gray PA-C, 650 mg at 05/17/22 2114    amLODIPine (NORVASC) tablet 10 mg, 10 mg, Oral, Daily, Shavonne Silverio PA-C, 10 mg at 05/19/22 1001    docusate sodium (COLACE) capsule 100 mg, 100 mg, Oral, Daily, Jonelle Lou PA-C, 100 mg at 05/19/22 1001    doxycycline hyclate (VIBRAMYCIN) capsule 100 mg, 100 mg, Oral, Q12H [address], Jonelle Lou PA-C, 100 mg at 05/19/22 1001    DULoxetine (CYMBALTA) delayed release capsule 60 mg, 60 mg, Oral, Daily, Jonelle Lou PA-C, 60 mg at 05/19/22 1001    gabapentin (NEURONTIN) capsule 100 mg, 100 mg, Oral, Daily, Jonelle Lou PA-C, 100 mg at 05/19/22 1001    gabapentin (NEURONTIN) capsule 300 mg, 300 mg, Oral, HS, Jonelle Lou PA-C, 300 mg at 05/18/22 2127    lidocaine (LIDODERM) 5 % patch 1 patch, 1 patch, Topical, Daily PRN, Annette Flores PA-C, 1 patch at 05/17/22 0032    methocarbamol (ROBAXIN) tablet 750 mg, 750 mg, Oral, Q6H PRN, Thee Gray PA-C, 750 mg at 05/18/22 0603    metoprolol succinate (TOPROL-XL) 24 hr tablet 100 mg, 100 mg, Oral, Daily, Jonelle Lou PA-C, 100 mg at 05/19/22 1004    ondansetron (ZOFRAN) injection 4 mg, 4 mg, Intravenous, Q4H PRN, Annette Flores PA-C, 4 mg at 05/17/22 2001    pantoprazole (PROTONIX) EC tablet 40 mg, 40 mg, Oral, BID AC, Jonelle Lou PA-C, 40 mg at 05/19/22 0523    potassium chloride (K-DUR,KLOR-CON) CR tablet 20 mEq, 20 mEq, Oral, Daily, Shavonne Silverio PA-C, 20 mEq at 05/19/22 1001    pravastatin (PRAVACHOL) tablet 40 mg, 40 mg, Oral, Daily With Dinner, Aniya Zaldivar PA-C, 40 mg at 05/18/22 1551    tamsulosin (FLOMAX) capsule 0 4 mg, 0 4 mg, Oral, Daily With Nevaeh Aj PA-C, 0 4 mg at 05/18/22 1551    Laboratory Results:  Results from last 7 days   Lab Units 05/19/22  0519 05/18/22  0617 05/17/22  0502 05/16/22  0455 05/15/22  0512 05/14/22  0520 05/13/22  0451   WBC Thousand/uL 5 92 5 08  --  6 08 5 92 6 35 6 79   HEMOGLOBIN g/dL 8 4* 8 6* 8 0* 8 6* 8 0* 7 6* 7 7*   HEMATOCRIT % 27 3* 27 5* 26 0* 27 4* 25 4* 24 3* 23 9*   PLATELETS Thousands/uL 195 195  --  187 195 195 206   POTASSIUM mmol/L 3 3* 3 5 3 3* 3 4* 3 3* 3 4* 3 3*   CHLORIDE mmol/L 103 104 101 104 102 103 103   CO2 mmol/L 27 25 26 27 27 28 27   BUN mg/dL 47* 47* 43* 46* 48* 47* 42*   CREATININE mg/dL 5 11* 5 15* 5 08* 4 98* 5 21* 5 39* 5 50*   CALCIUM mg/dL 8 9 8 7 8 3 8 8 8 5 8 6 8 7       I have personally reviewed the blood work as stated above and in my note  I have personally reviewed internal Medicine, co-consultants and previous nephrology notes

## 2022-05-19 NOTE — PROGRESS NOTES
Progress Note - General Surgery   Darlys Ready 71 y o  male MRN: [id number]  Unit/Bed#: -01 Encounter: [id number]    Assessment:  41-year-old male with hypertension, hyperlipidemia, PAF, factor 5 Leiden deficiency with history of DVT on Coumadin, recent cervical fusion complicated by MRSA SSI status post debridement washout and JANEL, suspected to be post infectious glomerulonephritis presents with worsening JANEL and gross hematuria    Plan:  Hematuria improving, will plan for outpatient follow-up with imaging and cystoscopy  Urology will continue to follow peripherally, re-consult if worsening hematuria or urinary retention  Nephrology following patient for JANEL  Plan for renal biopsy once Coumadin reversed  Medical management continues    Subjective/Objective   Chief Complaint: JANEL    Subjective: no new complaints    Objective:     Blood pressure (!) 171/79, pulse 60, temperature 97 7 °F (36 5 °C), resp  rate 18, weight 102 kg (225 lb 1 4 oz), SpO2 98 %  ,Body mass index is 31 39 kg/m²        Intake/Output Summary (Last 24 hours) at 5/19/2022 1245  Last data filed at 5/19/2022 1237  Gross per 24 hour   Intake 610 ml   Output 2990 ml   Net -2380 ml       Invasive Devices  Report    Peripheral Intravenous Line  Duration           Peripheral IV 05/19/22 Distal;Right;Upper;Ventral (anterior) Arm <1 day                Physical Exam: BP (!) 171/79   Pulse 60   Temp 97 7 °F (36 5 °C)   Resp 18   Wt 102 kg (225 lb 1 4 oz)   SpO2 98%   BMI 31 39 kg/m²   General appearance: alert and oriented, in no acute distress  Lungs: clear to auscultation bilaterally  Heart: regular rate and rhythm, S1, S2 normal, no murmur, click, rub or gallop  Abdomen: soft, non-tender; bowel sounds normal; no masses,  no organomegaly  Extremities: extremities normal, warm and well-perfused; no cyanosis, clubbing, or edema    Lab, Imaging and other studies:  CBC:   Lab Results   Component Value Date    WBC 5 92 05/19/2022    HGB 8 4 (L) 05/19/2022    HCT 27 3 (L) 05/19/2022    MCV 92 05/19/2022     05/19/2022    MCH 28 2 05/19/2022    MCHC 30 8 (L) 05/19/2022    RDW 14 2 05/19/2022    MPV 10 5 05/19/2022   , CMP:   Lab Results   Component Value Date    SODIUM 140 05/19/2022    K 3 3 (L) 05/19/2022     05/19/2022    CO2 27 05/19/2022    BUN 47 (H) 05/19/2022    CREATININE 5 11 (H) 05/19/2022    CALCIUM 8 9 05/19/2022    EGFR 10 05/19/2022     VTE Pharmacologic Prophylaxis: Sequential compression device (Venodyne)   VTE Mechanical Prophylaxis: sequential compression device

## 2022-05-20 PROBLEM — E87.6 HYPOKALEMIA: Status: RESOLVED | Noted: 2022-05-10 | Resolved: 2022-05-20

## 2022-05-20 LAB
ANION GAP SERPL CALCULATED.3IONS-SCNC: 10 MMOL/L (ref 4–13)
BUN SERPL-MCNC: 50 MG/DL (ref 5–25)
CALCIUM SERPL-MCNC: 8.6 MG/DL (ref 8.3–10.1)
CHLORIDE SERPL-SCNC: 103 MMOL/L (ref 100–108)
CO2 SERPL-SCNC: 26 MMOL/L (ref 21–32)
CREAT SERPL-MCNC: 5.09 MG/DL (ref 0.6–1.3)
GFR SERPL CREATININE-BSD FRML MDRD: 10 ML/MIN/1.73SQ M
GLUCOSE SERPL-MCNC: 98 MG/DL (ref 65–140)
INR PPP: 3 (ref 0.84–1.19)
POTASSIUM SERPL-SCNC: 3.6 MMOL/L (ref 3.5–5.3)
PROTHROMBIN TIME: 30.3 SECONDS (ref 11.6–14.5)
SODIUM SERPL-SCNC: 139 MMOL/L (ref 136–145)

## 2022-05-20 PROCEDURE — 99232 SBSQ HOSP IP/OBS MODERATE 35: CPT | Performed by: INTERNAL MEDICINE

## 2022-05-20 PROCEDURE — 85610 PROTHROMBIN TIME: CPT | Performed by: INTERNAL MEDICINE

## 2022-05-20 PROCEDURE — NC001 PR NO CHARGE: Performed by: INTERNAL MEDICINE

## 2022-05-20 PROCEDURE — 80048 BASIC METABOLIC PNL TOTAL CA: CPT | Performed by: PHYSICIAN ASSISTANT

## 2022-05-20 PROCEDURE — 94760 N-INVAS EAR/PLS OXIMETRY 1: CPT

## 2022-05-20 RX ORDER — DOXAZOSIN MESYLATE 1 MG/1
1 TABLET ORAL
Status: DISCONTINUED | OUTPATIENT
Start: 2022-05-20 | End: 2022-05-23

## 2022-05-20 RX ADMIN — PANTOPRAZOLE SODIUM 40 MG: 40 TABLET, DELAYED RELEASE ORAL at 05:54

## 2022-05-20 RX ADMIN — METOPROLOL SUCCINATE 100 MG: 50 TABLET, EXTENDED RELEASE ORAL at 08:55

## 2022-05-20 RX ADMIN — DOXYCYCLINE 100 MG: 100 CAPSULE ORAL at 08:56

## 2022-05-20 RX ADMIN — PRAVASTATIN SODIUM 40 MG: 40 TABLET ORAL at 17:13

## 2022-05-20 RX ADMIN — DULOXETINE 60 MG: 30 CAPSULE, DELAYED RELEASE ORAL at 08:55

## 2022-05-20 RX ADMIN — POTASSIUM CHLORIDE 20 MEQ: 1500 TABLET, EXTENDED RELEASE ORAL at 08:55

## 2022-05-20 RX ADMIN — DOCUSATE SODIUM 100 MG: 100 CAPSULE, LIQUID FILLED ORAL at 08:56

## 2022-05-20 RX ADMIN — AMLODIPINE BESYLATE 10 MG: 5 TABLET ORAL at 08:55

## 2022-05-20 RX ADMIN — DOXAZOSIN 1 MG: 1 TABLET ORAL at 21:07

## 2022-05-20 RX ADMIN — TAMSULOSIN HYDROCHLORIDE 0.4 MG: 0.4 CAPSULE ORAL at 17:13

## 2022-05-20 RX ADMIN — POTASSIUM CHLORIDE 20 MEQ: 1500 TABLET, EXTENDED RELEASE ORAL at 17:13

## 2022-05-20 RX ADMIN — DOXYCYCLINE 100 MG: 100 CAPSULE ORAL at 21:07

## 2022-05-20 RX ADMIN — GABAPENTIN 300 MG: 300 CAPSULE ORAL at 21:07

## 2022-05-20 RX ADMIN — PANTOPRAZOLE SODIUM 40 MG: 40 TABLET, DELAYED RELEASE ORAL at 17:13

## 2022-05-20 RX ADMIN — GABAPENTIN 100 MG: 100 CAPSULE ORAL at 08:56

## 2022-05-20 NOTE — ASSESSMENT & PLAN NOTE
Patient has PAF  He denies palpitations    Patient maintained sinus rhythm    Continue Toprol    INR currently therapeutic  Continue holding warfarin before possible kidney biopsy, will consider giving vitamin K

## 2022-05-20 NOTE — PLAN OF CARE
Problem: PAIN - ADULT  Goal: Verbalizes/displays adequate comfort level or baseline comfort level  Description: Interventions:  - Encourage patient to monitor pain and request assistance  - Assess pain using appropriate pain scale  - Administer analgesics based on type and severity of pain and evaluate response  - Implement non-pharmacological measures as appropriate and evaluate response  - Consider cultural and social influences on pain and pain management  - Notify physician/advanced practitioner if interventions unsuccessful or patient reports new pain  Outcome: Progressing     Problem: INFECTION - ADULT  Goal: Absence or prevention of progression during hospitalization  Description: INTERVENTIONS:  - Assess and monitor for signs and symptoms of infection  - Monitor lab/diagnostic results  - Monitor all insertion sites, i e  indwelling lines, tubes, and drains  - Monitor endotracheal if appropriate and nasal secretions for changes in amount and color  - Burns appropriate cooling/warming therapies per order  - Administer medications as ordered  - Instruct and encourage patient and family to use good hand hygiene technique  - Identify and instruct in appropriate isolation precautions for identified infection/condition  Outcome: Progressing     Problem: SAFETY ADULT  Goal: Patient will remain free of falls  Description: INTERVENTIONS:  - Educate patient/family on patient safety including physical limitations  - Instruct patient to call for assistance with activity   - Consult OT/PT to assist with strengthening/mobility   - Keep Call bell within reach  - Keep bed low and locked with side rails adjusted as appropriate  - Keep care items and personal belongings within reach  - Initiate and maintain comfort rounds  - Make Fall Risk Sign visible to staff  - Offer Toileting every 2 Hours, in advance of need  - Initiate/Maintain alarm  - Obtain necessary fall risk management equipment:   - Apply yellow socks and bracelet for high fall risk patients  - Consider moving patient to room near nurses station  Outcome: Progressing  Goal: Maintain or return to baseline ADL function  Description: INTERVENTIONS:  -  Assess patient's ability to carry out ADLs; assess patient's baseline for ADL function and identify physical deficits which impact ability to perform ADLs (bathing, care of mouth/teeth, toileting, grooming, dressing, etc )  - Assess/evaluate cause of self-care deficits   - Assess range of motion  - Assess patient's mobility; develop plan if impaired  - Assess patient's need for assistive devices and provide as appropriate  - Encourage maximum independence but intervene and supervise when necessary  - Involve family in performance of ADLs  - Assess for home care needs following discharge   - Consider OT consult to assist with ADL evaluation and planning for discharge  - Provide patient education as appropriate  Outcome: Progressing  Goal: Maintains/Returns to pre admission functional level  Description: INTERVENTIONS:  - Perform BMAT or MOVE assessment daily    - Set and communicate daily mobility goal to care team and patient/family/caregiver     - Collaborate with rehabilitation services on mobility goals if consulted  - Out of bed for toileting  - Record patient progress and toleration of activity level   Outcome: Progressing

## 2022-05-20 NOTE — TELEMEDICINE
e-Consult (IPC)  - Interventional Radiology  Mohit Patino 71 y o  male MRN: [id number]  Unit/Bed#: -01 Encounter: [id number]          Interventional Radiology has been consulted to evaluate Mohit Patino    We were consulted by nephrology concerning this patient with JANEL  IP Consult to IR  Consult performed by: Krupa Edmond MD  Consult ordered by: Maddy Taylor PA-C        05/20/22    Assessment/Recommendation:   Patient is a 71year-old male with history of a fib, factor V leiden, HTN, WILL, CHF, JANEL related to ATN vs glomerulonephritis due to MRSA, now with request for kidney biopsy for further evaluation  We will plan for renal biopsy early next week pending IR availability  Patient will need BP controlled to less than 149 systolic prior to biopsy  He may be transitioned off coumadin onto a heparin drip over the weekend  The heparin drip will need to be discontinued 4 hours prior to the procedure  Total time spent in review of data, discussion with requesting provider and rendering advice was 15 minutes     Thank you for allowing Interventional Radiology to participate in the care of Mohit Patino  Please don't hesitate to call or TigerText us with any questions       Krupa Edmond MD

## 2022-05-20 NOTE — ASSESSMENT & PLAN NOTE
Wt Readings from Last 3 Encounters:   05/20/22 102 kg (224 lb 13 9 oz)   05/03/22 114 kg (251 lb)   04/29/22 113 kg (250 lb 1 6 oz)     · Prior echo 04/25/2022:  EF 61%, Diastolic function: grade 2 pseudonormal relaxation  · Monitor I/O and daily weights  · Does not take outpatient diuretics  IV Lasix with minimal results during prior admission    Patient had received albumin to help with anasarca  · Fluid restriction, 2 g sodium

## 2022-05-20 NOTE — PROGRESS NOTES
[place]  Progress Note - Alvera Adjutant 1950, 71 y o  male MRN: [id number]  Unit/Bed#: -Yaya Encounter: [id number]  Primary Care Provider: Diana Gardner MD   Date and time admitted to hospital: 5/10/2022  6:51 PM    * JANEL (acute kidney injury) [facility])  Assessment & Plan  Patient admitted with acute kidney injury felt to be most likely due to post infectious  glomerulonephritis  Likely plan is for renal biopsy on Monday, continue to hold Coumadin, no lab work ordered for Saturday morning as it will not     Gross hematuria  Assessment & Plan  Patient developed gross hematuria during this hospital stay, he has had previously, it is improving as of today 5/20/2022  Continue to hold Coumadin    Chronic diastolic (congestive) heart failure (HCC)  Assessment & Plan  Wt Readings from Last 3 Encounters:   05/20/22 102 kg (224 lb 13 9 oz)   05/03/22 114 kg (251 lb)   04/29/22 113 kg (250 lb 1 6 oz)     · Prior echo 04/25/2022:  EF 77%, Diastolic function: grade 2 pseudonormal relaxation  · Monitor I/O and daily weights  · Does not take outpatient diuretics  IV Lasix with minimal results during prior admission  Patient had received albumin to help with anasarca  · Fluid restriction, 2 g sodium    Glomerulonephritis  Assessment & Plan  · Diagnosed during prior admission    · Postinfectious suspected  · Likely plan for renal biopsy on Monday    Surgical site infection  Assessment & Plan  · S/p cervical fusion performed by Dr Yenifer Linn on 7/15/54, complicated by MRSA infection  · Completed 24 days of IV vancomycin, transitioned to daptomycin on 04/24-4/29  Discharged home on doxycycline 100 mg b i d    · Continue doxycycline      Anemia  Assessment & Plan  Patient has chronic anemia  He denies signs of GI  bleeding  Hemoglobin is 8 6 today, stable    Check repeat labs Sunday    Mixed hyperlipidemia  Assessment & Plan  · Continue statin    Paroxysmal A-fib [facility])  Assessment & Plan  Patient has PAF  He denies palpitations  Patient maintained sinus rhythm    Continue Toprol    INR currently therapeutic  Continue holding warfarin before possible kidney biopsy, will consider giving vitamin K    Depression, major, single episode, moderate (HCC)  Assessment & Plan  · Continue Cymbalta    Factor V Leiden mutation [facility])  Assessment & Plan  Patient has history of left lower extremity DVT after which he was diagnosed with factor 5 Leiden mutation  This happened years ago  Holding Coumadin    Start IV heparin infusion/bridge when INR less than 2 0    WILL (obstructive sleep apnea)  Assessment & Plan  · CPAP HS - patient brought home machine in    Essential hypertension  Assessment & Plan  · Home regimen:  Amlodipine 5 mg daily and metoprolol succinate 100 mg daily    Hypokalemia-resolved as of 2022  Assessment & Plan  · Potassium 2 8 on admission  · Ordered 40 mEq p o  potassium and 20 mEq IV potassium   · On telemetry   · Continue to monitor and replete as needed        Code Status: Level 3 - DNAR and DNI    Subjective:   No pain    Objective:     Vitals:   Temp (24hrs), Av 6 °F (36 4 °C), Min:97 4 °F (36 3 °C), Max:98 °F (36 7 °C)    Temp:  [97 4 °F (36 3 °C)-98 °F (36 7 °C)] 97 4 °F (36 3 °C)  HR:  [57-64] 64  Resp:  [16-18] 18  BP: (139-162)/() 162/115  SpO2:  [95 %-99 %] 95 %  Body mass index is 31 36 kg/m²  Input and Output Summary (last 24 hours): Intake/Output Summary (Last 24 hours) at 2022 1417  Last data filed at 2022 1052  Gross per 24 hour   Intake 921 ml   Output 1300 ml   Net -379 ml       Physical Exam:   Physical Exam  Vitals and nursing note reviewed  Constitutional:       General: He is not in acute distress  Appearance: He is not ill-appearing, toxic-appearing or diaphoretic  HENT:      Head: Normocephalic and atraumatic        Right Ear: External ear normal       Left Ear: External ear normal    Cardiovascular: Rate and Rhythm: Normal rate  Pulmonary:      Effort: Pulmonary effort is normal    Musculoskeletal:         General: Normal range of motion  Cervical back: Normal range of motion  Neurological:      General: No focal deficit present  Mental Status: He is alert and oriented to person, place, and time  Mental status is at baseline  Cranial Nerves: No cranial nerve deficit  Psychiatric:         Mood and Affect: Mood normal          Behavior: Behavior normal          Thought Content: Thought content normal          Judgment: Judgment normal           Additional Data:     Labs:  Results from last 7 days   Lab Units 05/19/22  0519 05/17/22  0502 05/16/22  0455   WBC Thousand/uL 5 92   < > 6 08   HEMOGLOBIN g/dL 8 4*   < > 8 6*   HEMATOCRIT % 27 3*   < > 27 4*   PLATELETS Thousands/uL 195   < > 187   NEUTROS PCT %  --   --  53   LYMPHS PCT %  --   --  31   MONOS PCT %  --   --  10   EOS PCT %  --   --  5    < > = values in this interval not displayed  Results from last 7 days   Lab Units 05/20/22  0434 05/16/22  0455 05/15/22  0512   SODIUM mmol/L 139   < > 139   POTASSIUM mmol/L 3 6   < > 3 3*   CHLORIDE mmol/L 103   < > 102   CO2 mmol/L 26   < > 27   BUN mg/dL 50*   < > 48*   CREATININE mg/dL 5 09*   < > 5 21*   ANION GAP mmol/L 10   < > 10   CALCIUM mg/dL 8 6   < > 8 5   ALBUMIN g/dL  --   --  2 1*   TOTAL BILIRUBIN mg/dL  --   --  0 50   ALK PHOS U/L  --   --  86   ALT U/L  --   --  8*   AST U/L  --   --  15   GLUCOSE RANDOM mg/dL 98   < > 99    < > = values in this interval not displayed  Results from last 7 days   Lab Units 05/20/22  0434   INR  3 00*                   Lines/Drains:  Invasive Devices  Report    Peripheral Intravenous Line  Duration           Peripheral IV 05/19/22 Distal;Right;Upper;Ventral (anterior) Arm 1 day                      Imaging: No pertinent imaging reviewed      Recent Cultures (last 7 days):         Last 24 Hours Medication List:   Current Facility-Administered Medications   Medication Dose Route Frequency Provider Last Rate    acetaminophen  650 mg Oral Q6H PRN Clark Nuñez, FABIOLA      amLODIPine  10 mg Oral Daily Lisa Villagran, FABIOLA      docusate sodium  100 mg Oral Daily Clark Nuñez, FABIOLA      doxycycline hyclate  100 mg Oral Q12H [address] Clark Nuñez, FABIOLA      DULoxetine  60 mg Oral Daily Clark Nuñez, FABIOLA      gabapentin  100 mg Oral Daily Clark Nuñez, FABIOLA      gabapentin  300 mg Oral HS Clark Nuñez, FABIOLA      lidocaine  1 patch Topical Daily PRN Aleksandra Ice, FABIOLA      methocarbamol  750 mg Oral Q6H PRN Clark Nuñez, FABIOLA      metoprolol succinate  100 mg Oral Daily Clark Nuñez, FABIOLA      ondansetron  4 mg Intravenous Q4H PRN Annette Flores, FABIOLA      pantoprazole  40 mg Oral BID AC Jonelle Lou PA-C      potassium chloride  20 mEq Oral BID Lisa Villagran, FABIOLA      pravastatin  40 mg Oral Daily With FABIOLA Nesbitt      tamsulosin  0 4 mg Oral Daily With Dinner Clark Nuñez, FABIOLA          Today, Patient Was Seen By: Kiko Beck DO    **Please Note: This note may have been constructed using a voice recognition system  **

## 2022-05-20 NOTE — ASSESSMENT & PLAN NOTE
Patient has history of left lower extremity DVT after which he was diagnosed with factor 5 Leiden mutation  This happened years ago      Holding Coumadin    Start IV heparin infusion/bridge when INR less than 2 0

## 2022-05-20 NOTE — ASSESSMENT & PLAN NOTE
Patient admitted with acute kidney injury felt to be most likely due to post infectious  glomerulonephritis        Likely plan is for renal biopsy on Monday, continue to hold Coumadin, no lab work ordered for Saturday morning as it will not

## 2022-05-20 NOTE — ASSESSMENT & PLAN NOTE
· Diagnosed during prior admission    · Postinfectious suspected  · Likely plan for renal biopsy on Monday

## 2022-05-20 NOTE — PROGRESS NOTES
NEPHROLOGY PROGRESS NOTE   Laly Delgado 71 y o  male MRN: [id number]  Unit/Bed#: -01 Encounter: [id number]  Reason for Consult: JANEL, POA     71 y  o  male with HTN, HLD, WILL, PAF, factor 5 Leiden deficiency with hx DVT on coumadin, recent anemia, microscopic hematuria and recent cervical fusion 3/11/22 c/b MRSA SSI, s/p debridement/washout 4/1/22 and JANEL 4/22/2022 suspected due to postinfectious glomerulonephritis presents with worsening JANEL      ASSESSMENT/PLAN:  JANEL (POA):  Suspect related to ATN vs postinfectious glomerulonephritis due to MRSA SSI/OM after cervical fusion  -Admission creatinine 5 04 and has remained unchanged  Creatinine today 5 09  -Peak creatinine 5 5  -baseline creatinine 0 6-0 9  -1st rise in creatinine noted 4/18/2022 as outpatient with sCr 1 88, plateaued in high 3s prior to previous discharge   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 w/no renal biopsy  -workup: UA with 3+ protein, 100 glucose, large blood, innumerable RBC, 10-20 WBC, 2-4 hyaline casts  -previous serologies:  Hepatitis panel, C3/C4, anti double stranded DNA, ANCA, ZAK, GBM ab normal   SPEP/UPEP w/no monoclonal bands   -anti HUGH 2R pending  Collected 5/19/22  -Imaging:  Renal ultrasound 4/28/2022 with no hydronephrosis, normally distended bladder   Normal echogenicity  -nonoliguric   UO 2 2 L  -Plan:  · Renal function stabilized but creatinine stalled  · renal biopsy not done previously due to risk >benefit  However, benefits now outweighs risk given clinical course  Discussion as outlined 5/18/22 by Dr All Poon  Current plan for renal biopsy on 5/23/22 when INR appropriate  IR consulted  · Discussed at length with patient again  All questions answered  Patient agrees to proceed on Monday as planned unless renal recovery  · Coumadin currently being held  Continue to hold in anticipation of renal biopsy  Will require heparin bridge when INR subtherapeutic    INR 3 0  · Clinically, patient is not uremic and there is no acute indication for renal replacement therapy (dialysis)  · s/p albumin and Lasix 5/13  · Continue to hold diuretics and monitor off IVF and diuretics  · Check anti HUGH 2R, currently pending  · Strict I/Os, daily weights  · Avoid nephrotoxins, NSAIDs, IV contrast if possible  · Avoid hypotension   Maintain MAP >65  · Trend BMP  · Adjust meds to appropriate GFR  · Optimize hemodynamic status to avoid delay in renal recovery  · d/w Dr Patricio     Nephrotic range proteinuria:  -suspected due to acute glomerulonephritis  -most recent UPCr 12 on 5/5/22, improved from 18  -serology workup unrevealing as noted above  -continue to monitor     Gross Hematuria:  -hx microscopic hematuria 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  -+intermittent gross hematuria with clots since admission  None presently   -urology consult appreciated  No CBI presently  -Coumadin held secondary to hematuria and supratherapeutic INR and now for renal biopsy     -will require heparin bridge for renal biopsy  -management per primary medical service and urology     Hypertension/Volume status:  -BP improved with better control with increase in amlodipine to 10 mg daily  -clinically, examines euvolemic  -Home medications:  Amlodipine 5 mg daily, Toprol- mg daily  -Current medications:  Amlodipine 10 mg daily, Toprol  mg daily    -holding further diuretics  -Optimize hemodynamic status to avoid delay in renal recovery  -recommend hold parameters on antihypertensive's for SBP <130 mmHg    -Avoid hypotension or fluctuations in blood pressure   Maintain MAP >65  -continue to trend     Hypokalemia:  -on Kdur 20 mEq BID which was increased 5/19  -K+ 3 6 today  -continue to monitor     Anemia:  -Hgb 8 4 and below goal  Goal >10  -no schistocytes  -Iron studies:  Iron 41, ferritin 521, iron saturation 20%, TIBC 204  -previous SPEP/UPEP with no monoclonal bands  -continue to trend  -Transfuse for Hgb <7 0  -management per primary medical service     MRSA surgical site infection w/OM:  -cervical fusion 3/11/2022 c/b MRSA surgical site infection, osteomyelitis  -s/p debridement washout 4/1/2022  -previously on vancomycin--> daptomycin course completed 4/29/2022   continues on doxycycline  -antibiotics as per ID previous recommendation and per primary medical service  -surgical site management per Neurosurgery as outpatient     Factor V Leiden deficiency:  -hx associated DVT  -on chronic Coumadin for factor V Leiden and atrial fibrillation  -INR 3 0 and Coumadin held since 5/17/2022  -will require heparin bridging when INR subtherapeutic in anticipation renal biopsy     Hx acute diastolic heart failure:  -echo 4/25/22: EF 55% with grade 2 diastolic pseudo normal relaxation   IVC normal  -intermittent Lasix dosing during current and previous hospitalizations  -clinically, exam euvolemic  -continue to monitor off diuretics       SUBJECTIVE:  Patient without complaints  Creatinine unchanged at 5  Adequate urine output  VSS     OBJECTIVE:  Current Weight: Weight - Scale: 102 kg (224 lb 13 9 oz)  Vitals:    05/19/22 2253 05/20/22 0427 05/20/22 0732 05/20/22 0800   BP:   155/81    BP Location:       Pulse:   64    Resp:   18    Temp:   98 °F (36 7 °C)    TempSrc:       SpO2: 97% 95% 96% 96%   Weight:  102 kg (224 lb 13 9 oz)     Height:           Intake/Output Summary (Last 24 hours) at 5/20/2022 1125  Last data filed at 5/20/2022 1052  Gross per 24 hour   Intake 921 ml   Output 1725 ml   Net -804 ml     General:  Awake, alert, appears comfortable and in no acute distress  Nontoxic  Skin:  No rash, warm, good skin turgor   Eyes:  PERRL, EOMI, sclerae nonicteric   no periorbital edema   ENT:  Moist mucous membranes  Neck:  Trachea midline, symmetric  No JVD  Chest:  Clear to auscultation bilaterally without wheezes, crackles or rhonchi  CVS:  Regular rate and rhythm without murmur, gallop or rub    S1 and S2 identified and normal   No S3, S4    Abdomen: Soft, nontender, nondistended without masses  Normal bowel sounds x 4 quadrants  No bruit  Extremities:  Warm, pink, motor and sensory intact and well perfused  No cyanosis, pallor  Trace BLE edema  Chronic venous stasis skin changes BLE  Neuro:  Awake, alert, oriented x3  Grossly intact  Psych:  Appropriate affect  Mentating appropriately    Normal mental status exam        Medications:    Current Facility-Administered Medications:     acetaminophen (TYLENOL) tablet 650 mg, 650 mg, Oral, Q6H PRN, Coribn Plascencia PA-C, 650 mg at 05/17/22 2114    amLODIPine (NORVASC) tablet 10 mg, 10 mg, Oral, Daily, Shavonne Silverio PA-C, 10 mg at 05/20/22 0855    docusate sodium (COLACE) capsule 100 mg, 100 mg, Oral, Daily, Jonelle Lou PA-C, 100 mg at 05/20/22 0856    doxycycline hyclate (VIBRAMYCIN) capsule 100 mg, 100 mg, Oral, Q12H [address], Jonelle Lou PA-C, 100 mg at 05/20/22 0856    DULoxetine (CYMBALTA) delayed release capsule 60 mg, 60 mg, Oral, Daily, Jonelle Lou PA-C, 60 mg at 05/20/22 0855    gabapentin (NEURONTIN) capsule 100 mg, 100 mg, Oral, Daily, Jonelle Lou PA-C, 100 mg at 05/20/22 0856    gabapentin (NEURONTIN) capsule 300 mg, 300 mg, Oral, HS, Jonelle Lou PA-C, 300 mg at 05/19/22 2109    lidocaine (LIDODERM) 5 % patch 1 patch, 1 patch, Topical, Daily PRN, Annette Flores PA-C, 1 patch at 05/17/22 0032    methocarbamol (ROBAXIN) tablet 750 mg, 750 mg, Oral, Q6H PRN, Corbin Plascencia PA-C, 750 mg at 05/19/22 2108    metoprolol succinate (TOPROL-XL) 24 hr tablet 100 mg, 100 mg, Oral, Daily, Jonelle Lou PA-C, 100 mg at 05/20/22 0855    ondansetron (ZOFRAN) injection 4 mg, 4 mg, Intravenous, Q4H PRN, Annette Flores PA-C, 4 mg at 05/19/22 1634    pantoprazole (PROTONIX) EC tablet 40 mg, 40 mg, Oral, BID AC, Jonelle Lou PA-C, 40 mg at 05/20/22 0554    potassium chloride (K-DUR,KLOR-CON) CR tablet 20 mEq, 20 mEq, Oral, BID, Shavonen Silverio PA-C, 20 mEq at 05/20/22 0855    pravastatin (PRAVACHOL) tablet 40 mg, 40 mg, Oral, Daily With [place] Box, PA-C, 40 mg at 05/19/22 1630    tamsulosin (FLOMAX) capsule 0 4 mg, 0 4 mg, Oral, Daily With [place] Box, PA-C, 0 4 mg at 05/19/22 1630    Laboratory Results:  Results from last 7 days   Lab Units 05/20/22  0434 05/19/22  0519 05/18/22  0617 05/17/22  0502 05/16/22  0455 05/15/22  0512 05/14/22  0520   WBC Thousand/uL  --  5 92 5 08  --  6 08 5 92 6 35   HEMOGLOBIN g/dL  --  8 4* 8 6* 8 0* 8 6* 8 0* 7 6*   HEMATOCRIT %  --  27 3* 27 5* 26 0* 27 4* 25 4* 24 3*   PLATELETS Thousands/uL  --  195 195  --  187 195 195   POTASSIUM mmol/L 3 6 3 3* 3 5 3 3* 3 4* 3 3* 3 4*   CHLORIDE mmol/L 103 103 104 101 104 102 103   CO2 mmol/L 26 27 25 26 27 27 28   BUN mg/dL 50* 47* 47* 43* 46* 48* 47*   CREATININE mg/dL 5 09* 5 11* 5 15* 5 08* 4 98* 5 21* 5 39*   CALCIUM mg/dL 8 6 8 9 8 7 8 3 8 8 8 5 8 6       I have personally reviewed the blood work as stated above and in my note  I have personally reviewed internal Medicine, co-consultants and previous nephrology notes

## 2022-05-20 NOTE — ASSESSMENT & PLAN NOTE
Patient has chronic anemia  He denies signs of GI  bleeding  Hemoglobin is 8 6 today, stable    Check repeat labs Sunday

## 2022-05-20 NOTE — ASSESSMENT & PLAN NOTE
Patient developed gross hematuria during this hospital stay, he has had previously, it is improving as of today 5/20/2022  Continue to hold Coumadin

## 2022-05-20 NOTE — ASSESSMENT & PLAN NOTE
· S/p cervical fusion performed by Dr Pelon Gates on 9/20/66, complicated by MRSA infection  · Completed 24 days of IV vancomycin, transitioned to daptomycin on 04/24-4/29    Discharged home on doxycycline 100 mg b i d    · Continue doxycycline

## 2022-05-20 NOTE — PLAN OF CARE
Problem: PAIN - ADULT  Goal: Verbalizes/displays adequate comfort level or baseline comfort level  Description: Interventions:  - Encourage patient to monitor pain and request assistance  - Assess pain using appropriate pain scale  - Administer analgesics based on type and severity of pain and evaluate response  - Implement non-pharmacological measures as appropriate and evaluate response  - Consider cultural and social influences on pain and pain management  - Notify physician/advanced practitioner if interventions unsuccessful or patient reports new pain  Outcome: Progressing     Problem: INFECTION - ADULT  Goal: Absence or prevention of progression during hospitalization  Description: INTERVENTIONS:  - Assess and monitor for signs and symptoms of infection  - Monitor lab/diagnostic results  - Monitor all insertion sites, i e  indwelling lines, tubes, and drains  - Monitor endotracheal if appropriate and nasal secretions for changes in amount and color  - [place] appropriate cooling/warming therapies per order  - Administer medications as ordered  - Instruct and encourage patient and family to use good hand hygiene technique  - Identify and instruct in appropriate isolation precautions for identified infection/condition  Outcome: Progressing     Problem: SAFETY ADULT  Goal: Patient will remain free of falls  Description: INTERVENTIONS:  - Educate patient/family on patient safety including physical limitations  - Instruct patient to call for assistance with activity   - Consult OT/PT to assist with strengthening/mobility   - Keep Call bell within reach  - Keep bed low and locked with side rails adjusted as appropriate  - Keep care items and personal belongings within reach  - Initiate and maintain comfort rounds  - Make Fall Risk Sign visible to staff  - Offer Toileting every 2 Hours, in advance of need  - Initiate/Maintain alarm  - Obtain necessary fall risk management equipment:   - Apply yellow socks and bracelet for high fall risk patients  - Consider moving patient to room near nurses station  Outcome: Progressing  Goal: Maintain or return to baseline ADL function  Description: INTERVENTIONS:  -  Assess patient's ability to carry out ADLs; assess patient's baseline for ADL function and identify physical deficits which impact ability to perform ADLs (bathing, care of mouth/teeth, toileting, grooming, dressing, etc )  - Assess/evaluate cause of self-care deficits   - Assess range of motion  - Assess patient's mobility; develop plan if impaired  - Assess patient's need for assistive devices and provide as appropriate  - Encourage maximum independence but intervene and supervise when necessary  - Involve family in performance of ADLs  - Assess for home care needs following discharge   - Consider OT consult to assist with ADL evaluation and planning for discharge  - Provide patient education as appropriate  Outcome: Progressing  Goal: Maintains/Returns to pre admission functional level  Description: INTERVENTIONS:  - Perform BMAT or MOVE assessment daily    - Set and communicate daily mobility goal to care team and patient/family/caregiver     - Collaborate with rehabilitation services on mobility goals if consulted  - Out of bed for toileting  - Record patient progress and toleration of activity level   Outcome: Progressing     Problem: DISCHARGE PLANNING  Goal: Discharge to home or other facility with appropriate resources  Description: INTERVENTIONS:  - Identify barriers to discharge w/patient and caregiver  - Arrange for needed discharge resources and transportation as appropriate  - Identify discharge learning needs (meds, wound care, etc )  - Arrange for interpretive services to assist at discharge as needed  - Refer to Case Management Department for coordinating discharge planning if the patient needs post-hospital services based on physician/advanced practitioner order or complex needs related to functional status, cognitive ability, or social support system  Outcome: Progressing     Problem: Knowledge Deficit  Goal: Patient/family/caregiver demonstrates understanding of disease process, treatment plan, medications, and discharge instructions  Description: Complete learning assessment and assess knowledge base    Interventions:  - Provide teaching at level of understanding  - Provide teaching via preferred learning methods  Outcome: Progressing     Problem: HEMATOLOGIC - ADULT  Goal: Maintains hematologic stability  Description: INTERVENTIONS  - Assess for signs and symptoms of bleeding or hemorrhage  - Monitor labs  - Administer supportive blood products/factors as ordered and appropriate  Outcome: Progressing     Problem: MUSCULOSKELETAL - ADULT  Goal: Maintain or return mobility to safest level of function  Description: INTERVENTIONS:  - Assess patient's ability to carry out ADLs; assess patient's baseline for ADL function and identify physical deficits which impact ability to perform ADLs (bathing, care of mouth/teeth, toileting, grooming, dressing, etc )  - Assess/evaluate cause of self-care deficits   - Assess range of motion  - Assess patient's mobility  - Assess patient's need for assistive devices and provide as appropriate  - Encourage maximum independence but intervene and supervise when necessary  - Involve family in performance of ADLs  - Assess for home care needs following discharge   - Consider OT consult to assist with ADL evaluation and planning for discharge  - Provide patient education as appropriate  Outcome: Progressing

## 2022-05-21 PROCEDURE — 99232 SBSQ HOSP IP/OBS MODERATE 35: CPT | Performed by: STUDENT IN AN ORGANIZED HEALTH CARE EDUCATION/TRAINING PROGRAM

## 2022-05-21 PROCEDURE — 99233 SBSQ HOSP IP/OBS HIGH 50: CPT | Performed by: INTERNAL MEDICINE

## 2022-05-21 RX ADMIN — PRAVASTATIN SODIUM 40 MG: 40 TABLET ORAL at 16:18

## 2022-05-21 RX ADMIN — DULOXETINE 60 MG: 30 CAPSULE, DELAYED RELEASE ORAL at 08:50

## 2022-05-21 RX ADMIN — TAMSULOSIN HYDROCHLORIDE 0.4 MG: 0.4 CAPSULE ORAL at 16:18

## 2022-05-21 RX ADMIN — DOXYCYCLINE 100 MG: 100 CAPSULE ORAL at 08:50

## 2022-05-21 RX ADMIN — DOCUSATE SODIUM 100 MG: 100 CAPSULE, LIQUID FILLED ORAL at 08:50

## 2022-05-21 RX ADMIN — DOXYCYCLINE 100 MG: 100 CAPSULE ORAL at 21:59

## 2022-05-21 RX ADMIN — GABAPENTIN 300 MG: 300 CAPSULE ORAL at 21:59

## 2022-05-21 RX ADMIN — PANTOPRAZOLE SODIUM 40 MG: 40 TABLET, DELAYED RELEASE ORAL at 06:06

## 2022-05-21 RX ADMIN — AMLODIPINE BESYLATE 10 MG: 5 TABLET ORAL at 08:50

## 2022-05-21 RX ADMIN — POTASSIUM CHLORIDE 20 MEQ: 1500 TABLET, EXTENDED RELEASE ORAL at 08:50

## 2022-05-21 RX ADMIN — PANTOPRAZOLE SODIUM 40 MG: 40 TABLET, DELAYED RELEASE ORAL at 16:18

## 2022-05-21 RX ADMIN — DOXAZOSIN 1 MG: 1 TABLET ORAL at 21:59

## 2022-05-21 RX ADMIN — METHOCARBAMOL TABLETS 750 MG: 500 TABLET, COATED ORAL at 21:59

## 2022-05-21 RX ADMIN — METOPROLOL SUCCINATE 100 MG: 50 TABLET, EXTENDED RELEASE ORAL at 08:50

## 2022-05-21 RX ADMIN — METHOCARBAMOL TABLETS 750 MG: 500 TABLET, COATED ORAL at 06:12

## 2022-05-21 RX ADMIN — ACETAMINOPHEN 650 MG: 325 TABLET, FILM COATED ORAL at 22:00

## 2022-05-21 RX ADMIN — POTASSIUM CHLORIDE 20 MEQ: 1500 TABLET, EXTENDED RELEASE ORAL at 16:18

## 2022-05-21 RX ADMIN — GABAPENTIN 100 MG: 100 CAPSULE ORAL at 08:50

## 2022-05-21 NOTE — ASSESSMENT & PLAN NOTE
Wt Readings from Last 3 Encounters:   05/21/22 101 kg (222 lb 9 6 oz)   05/03/22 114 kg (251 lb)   04/29/22 113 kg (250 lb 1 6 oz)     · Prior echo 04/25/2022:  EF 84%, Diastolic function: grade 2 pseudonormal relaxation  · Monitor I/O and daily weights  · Does not take outpatient diuretics  IV Lasix with minimal results during prior admission    Patient had received albumin to help with anasarca  · Fluid restriction, 2 g sodium

## 2022-05-21 NOTE — PROGRESS NOTES
[place]  Progress Note - Emilie Kearney 1950, 71 y o  male MRN: [id number]  Unit/Bed#: -01 Encounter: [id number]  Primary Care Provider: Berto Spear MD   Date and time admitted to hospital: 5/10/2022  6:51 PM    Gross hematuria  Assessment & Plan  Patient developed gross hematuria during this hospital stay, he has had previously, it is improving as of today 5/20/2022  Continue to hold Coumadin    5/21 denies hematuria    Chronic diastolic (congestive) heart failure [facility])  Assessment & Plan  Wt Readings from Last 3 Encounters:   05/21/22 101 kg (222 lb 9 6 oz)   05/03/22 114 kg (251 lb)   04/29/22 113 kg (250 lb 1 6 oz)     · Prior echo 04/25/2022:  EF 25%, Diastolic function: grade 2 pseudonormal relaxation  · Monitor I/O and daily weights  · Does not take outpatient diuretics  IV Lasix with minimal results during prior admission  Patient had received albumin to help with anasarca  · Fluid restriction, 2 g sodium    Glomerulonephritis  Assessment & Plan  · Diagnosed during prior admission    · Postinfectious suspected  · Likely plan for renal biopsy on Monday    Surgical site infection  Assessment & Plan  · S/p cervical fusion performed by Dr Maria Teresa Bentley on 2/79/90, complicated by MRSA infection  · Completed 24 days of IV vancomycin, transitioned to daptomycin on 04/24-4/29  Discharged home on doxycycline 100 mg b i d    · Continue doxycycline      Anemia  Assessment & Plan  Patient has chronic anemia  He denies signs of GI  bleeding  Hemoglobin is 8 6 today, stable  Check repeat labs Sunday    Mixed hyperlipidemia  Assessment & Plan  · Continue statin    Paroxysmal A-fib [facility])  Assessment & Plan  Patient has PAF  He denies palpitations    Patient maintained sinus rhythm    Continue Toprol    INR currently therapeutic  Continue holding warfarin before possible kidney biopsy, will consider giving vitamin K    Depression, major, single episode, moderate [facility])  Assessment & Plan  · Continue Cymbalta    Factor V Leiden mutation [facility])  Assessment & Plan  Patient has history of left lower extremity DVT after which he was diagnosed with factor 5 Leiden mutation  This happened years ago  Holding Coumadin    Start IV heparin infusion/bridge when INR less than 2 0    WILL (obstructive sleep apnea)  Assessment & Plan  · CPAP HS - patient brought home machine in    Essential hypertension  Assessment & Plan  · Home regimen:  Amlodipine 5 mg daily and metoprolol succinate 100 mg daily    * JANEL (acute kidney injury) [facility])  Assessment & Plan  Patient admitted with acute kidney injury felt to be most likely due to post infectious  glomerulonephritis  Likely plan is for renal biopsy on Monday, continue to hold Coumadin, no lab work ordered for Saturday morning as it will not     Hypoalbuminemia-resolved as of 5/12/2022  Assessment & Plan  · Albumin 2 1 on admission   · Patient with bilateral lower extremity swelling  Suspect anasarca   · Ordered 1 dose of IV albumin  · Nephrology consulted    [facility] as of 5/17/2022  Assessment & Plan  · Reports ongoing weakness and fatigue  Per wife patient spends majority of the day napping  · Denies recent falls  · Suspect ongoing weakness/fatigue due to ongoing infection, JANEL, GN and deconditioning from frequent hospital admissions  · Consult PT/OT          VTE Pharmacologic Prophylaxis: VTE Score: 3 Moderate Risk (Score 3-4) - Pharmacological DVT Prophylaxis Contraindicated  Sequential Compression Devices Ordered  Patient Centered Rounds: I performed bedside rounds with nursing staff today  Discussions with Specialists or Other Care Team Provider: case management    Education and Discussions with Family / Patient: Updated  (wife) via phone  Time Spent for Care: 30 minutes  More than 50% of total time spent on counseling and coordination of care as described above      Current Length of Stay: 11 day(s)  Current Patient Status: Inpatient   Certification Statement: The patient will continue to require additional inpatient hospital stay due to pending kidney bx monday  Discharge Plan: Anticipate discharge in >72 hrs to discharge location to be determined pending rehab evaluations  Code Status: Level 3 - DNAR and DNI    Subjective:   Patient seen examined at bedside  No acute events overnight  Patient states that he is not having hematuria today  Objective:     Vitals:   Temp (24hrs), Av 6 °F (36 4 °C), Min:97 3 °F (36 3 °C), Max:97 9 °F (36 6 °C)    Temp:  [97 3 °F (36 3 °C)-97 9 °F (36 6 °C)] 97 3 °F (36 3 °C)  HR:  [57-62] 57  Resp:  [17-18] 17  BP: (144-156)/(73-81) 144/75  SpO2:  [97 %-98 %] 97 %  Body mass index is 31 05 kg/m²  Input and Output Summary (last 24 hours): Intake/Output Summary (Last 24 hours) at 2022 1546  Last data filed at 2022 1254  Gross per 24 hour   Intake 781 ml   Output 2000 ml   Net -1219 ml       Physical Exam:   Physical Exam  Vitals reviewed  HENT:      Head: Normocephalic and atraumatic  Right Ear: External ear normal       Left Ear: External ear normal       Nose: Nose normal       Mouth/Throat:      Mouth: Mucous membranes are moist       Pharynx: Oropharynx is clear  Eyes:      Extraocular Movements: Extraocular movements intact  Cardiovascular:      Rate and Rhythm: Normal rate and regular rhythm  Pulses: Normal pulses  Heart sounds: Normal heart sounds  Pulmonary:      Effort: Pulmonary effort is normal       Breath sounds: Normal breath sounds  Abdominal:      General: Abdomen is flat  Palpations: Abdomen is soft  Tenderness: There is no abdominal tenderness  Musculoskeletal:         General: Normal range of motion  Cervical back: Normal range of motion  Skin:     General: Skin is warm and dry  Neurological:      General: No focal deficit present  Mental Status: He is alert   Mental status is at baseline  Psychiatric:         Mood and Affect: Mood normal          Behavior: Behavior normal           Additional Data:     Labs:  Results from last 7 days   Lab Units 05/19/22  0519 05/17/22  0502 05/16/22  0455   WBC Thousand/uL 5 92   < > 6 08   HEMOGLOBIN g/dL 8 4*   < > 8 6*   HEMATOCRIT % 27 3*   < > 27 4*   PLATELETS Thousands/uL 195   < > 187   NEUTROS PCT %  --   --  53   LYMPHS PCT %  --   --  31   MONOS PCT %  --   --  10   EOS PCT %  --   --  5    < > = values in this interval not displayed  Results from last 7 days   Lab Units 05/20/22  0434 05/16/22  0455 05/15/22  0512   SODIUM mmol/L 139   < > 139   POTASSIUM mmol/L 3 6   < > 3 3*   CHLORIDE mmol/L 103   < > 102   CO2 mmol/L 26   < > 27   BUN mg/dL 50*   < > 48*   CREATININE mg/dL 5 09*   < > 5 21*   ANION GAP mmol/L 10   < > 10   CALCIUM mg/dL 8 6   < > 8 5   ALBUMIN g/dL  --   --  2 1*   TOTAL BILIRUBIN mg/dL  --   --  0 50   ALK PHOS U/L  --   --  86   ALT U/L  --   --  8*   AST U/L  --   --  15   GLUCOSE RANDOM mg/dL 98   < > 99    < > = values in this interval not displayed  Results from last 7 days   Lab Units 05/20/22  0434   INR  3 00*                   Lines/Drains:  Invasive Devices  Report    Peripheral Intravenous Line  Duration           Peripheral IV 05/19/22 Distal;Right;Upper;Ventral (anterior) Arm 2 days                      Imaging: No pertinent imaging reviewed      Recent Cultures (last 7 days):         Last 24 Hours Medication List:   Current Facility-Administered Medications   Medication Dose Route Frequency Provider Last Rate    acetaminophen  650 mg Oral Q6H PRN Sachi Sheth PA-C      amLODIPine  10 mg Oral Daily Shavonne Silverio PA-C      docusate sodium  100 mg Oral Daily Sachi Sheth PA-C      doxazosin  1 mg Oral HS Sang Ziegler MD      doxycycline hyclate  100 mg Oral Q12H [address] Sachi Sheth PA-C      DULoxetine  60 mg Oral Daily Sachi Sheth PA-C      gabapentin  100 mg Oral Daily Jo-Ann Haas FABIOLA Lou      gabapentin  300 mg Oral HS Valeen Gain, FABIOLA      lidocaine  1 patch Topical Daily PRN Mariella Gupta PA-C      methocarbamol  750 mg Oral Q6H PRN Valeen Gain, FABIOLA      metoprolol succinate  100 mg Oral Daily Valeen Gain, FABIOLA      ondansetron  4 mg Intravenous Q4H PRN Annette Flores, FABIOLA      pantoprazole  40 mg Oral BID AC Jonelle Lou PA-C      potassium chloride  20 mEq Oral BID Anahy Naranjo PA-C      pravastatin  40 mg Oral Daily With FABIOLA Nesbitt      tamsulosin  0 4 mg Oral Daily With Dinner Valeen Gain, FABIOLA          Today, Patient Was Seen By: Erika England DO    **Please Note: This note may have been constructed using a voice recognition system  **

## 2022-05-21 NOTE — PROGRESS NOTES
Follow up Consultation    Nephrology   Angelo Rodrigues 71 y o  male MRN: [id number]  Unit/Bed#: -01 Encounter: [id number]      Physician Requesting Consult: Macario Tse DO        ASSESSMENT/PLAN:  76-year-old male with multiple comorbidities including CKD, DVT, factor 5 laden deficiency, CHF status post cervical fusion in March complicated with MRSA surgical in flexion and osteomyelitis along with acute kidney injury in April 2022 now admitted with nausea and worsening renal parameters  Nephrology following for acute kidney injury  Acute kidney injury (POA) on CKD: JANEL multifactorial most likely secondary to incomplete recovery from prior episode of acute kidney injury in April 2022 possibly post infectious GN and subsequent ATN  After review of records [facility] as well as Care everywhere it appears that the patient has a baseline Creatinine of 0 6-0 9 mg/dL  patient was admitted with a creatinine of 5 04 mg/dL  patient's creatinine yesterday is at 5 09 mg/dL, appears plateaued  No labs from today thus far  Renal ultrasound with no hydronephrosis  See detailed note from 05/20/2022 with regards to negative serological workup  Plan initially was to hold off on renal biopsy due to risks involved with patient being on anticoagulation however subsequent to that it was finalized that patient would undergo renal biopsy on 05/23/2022 as long as INR is acceptable  Biopsy forms have been sent for [place] by Donna Petty  Patient is to be NPO Sunday night for potential renal biopsy  No acute indication for dialysis at this time  check BMP in a m  Await renal recovery  Optimize hemodynamic status to avoid delay in renal recovery  Place on a renal diet when allowed diet order  Avoid nephrotoxins, adjust meds to appropriate GFR  Strict I/O    Daily weights  Urinary retention protocol if patient does not have a Lam  will need to set up patient for follow up with Nephrology as an outpatient post hospitalization  for nephrology as an outpatient patient follows up with Dr Yesy Kitchen    Blood pressure/hypertension:  current medications:  Norvasc 10 mg p o  Q day, Cardura 1 mg p o  Q h s  Toprol- mg p o  Q day  recommendations:  No changes for now continue to monitor  Hold further Lasix for now patient with good urine output likely in diuretic phase  Optimize hemodynamics  Maintain MAP > 65mmHg  Avoid BP fluctuations  H/H/anemia:  most recent hemoglobin at 8 4 grams/deciliter  maintain hemoglobin greater than 8 grams/deciliter    Acid-base electrolytes:    Electrolytes:      Hypokalemia:  Most recent potassium 3 6 stable  On K-Dur 20 mEq p o  B i d     Acid-base:    Most recent bicarb stable at 26    Other medical problems:  Nephrotic range Proteinuria: Most recent protein creatinine ratio improving down to 12 4 g  History of coagulopathy disorder:  Management per primary team   Holding oral anticoagulation and bridging with heparin due to plans for renal biopsy on Monday  Microscopic hematuria:  Follow-up with Urology  Negative serologies thus far  Thanks for the consult  Will continue to follow  Please call with questions/ concerns  Above-mentioned orders and Plan in terms of acute kidney injury was discussed with the team in [address] MD, [facility], 2022, 1:57 PM              Objective :   Patient seen and examined in his room urine output close to 2 5 L last 24 hours blood pressure is starting to improve remains afebrile  Spoke to his spouse while in his room on the phone  Discussed renal status and updates  Reports his urine is starting to clear up        PHYSICAL EXAM  /73   Pulse 58   Temp 97 9 °F (36 6 °C)   Resp 18   Ht 5' 11" (1 803 m)   Wt 101 kg (222 lb 9 6 oz)   SpO2 98%   BMI 31 05 kg/m²   Temp (24hrs), Av 7 °F (36 5 °C), Min:97 4 °F (36 3 °C), Max:97 9 °F (36 6 °C)        Intake/Output Summary (Last 24 hours) at 2022 1357  Last data filed at 5/21/2022 1254  Gross per 24 hour   Intake 981 ml   Output 2400 ml   Net -1419 ml       I/O last 24 hours: In: 9623 [P O :1202]  Out: 2900 [Urine:2900]      Current Weight: Weight - Scale: 101 kg (222 lb 9 6 oz)  First Weight: Weight - Scale: 114 kg (251 lb 5 2 oz)  Physical Exam  Vitals and nursing note reviewed  Constitutional:       General: He is not in acute distress  Appearance: Normal appearance  He is obese  He is not ill-appearing, toxic-appearing or diaphoretic  HENT:      Head: Normocephalic and atraumatic  Mouth/Throat:      Pharynx: Oropharynx is clear  No oropharyngeal exudate  Eyes:      General: No scleral icterus  Conjunctiva/sclera: Conjunctivae normal    Neck:      Comments: Neck collar in place  Cardiovascular:      Rate and Rhythm: Normal rate  Heart sounds: Normal heart sounds  No friction rub  Pulmonary:      Effort: Pulmonary effort is normal  No respiratory distress  Breath sounds: Normal breath sounds  No stridor  No wheezing  Abdominal:      General: There is no distension  Palpations: Abdomen is soft  There is no mass  Tenderness: There is no abdominal tenderness  Musculoskeletal:         General: Swelling present  Cervical back: Neck supple  Comments: Trace presacral edema bilaterally   Skin:     General: Skin is warm  Coloration: Skin is not jaundiced  Neurological:      General: No focal deficit present  Mental Status: He is alert and oriented to person, place, and time  Psychiatric:         Mood and Affect: Mood normal          Behavior: Behavior normal              Review of Systems   Constitutional: Negative for appetite change, chills and fatigue  HENT: Negative for congestion  Respiratory: Negative for cough, shortness of breath and wheezing  Cardiovascular: Negative for leg swelling  Gastrointestinal: Negative for abdominal pain, diarrhea, nausea and vomiting     Genitourinary: Positive for hematuria  Urine is now clearing up   Musculoskeletal: Negative for back pain  Neurological: Negative for headaches  Psychiatric/Behavioral: Negative for agitation and confusion  All other systems reviewed and are negative  Scheduled Meds:  Current Facility-Administered Medications   Medication Dose Route Frequency Provider Last Rate    acetaminophen  650 mg Oral Q6H PRN Melchor Jenkins PA-C      amLODIPine  10 mg Oral Daily Anuj Sibley PA-C      docusate sodium  100 mg Oral Daily Melchor Jenkins PA-C      doxazosin  1 mg Oral HS Ben Nolan MD      doxycycline hyclate  100 mg Oral Q12H [address] Melchor Jenkins PA-C      DULoxetine  60 mg Oral Daily Melchor Jenkins PA-C      gabapentin  100 mg Oral Daily Melchor Jenkins PA-C      gabapentin  300 mg Oral HS Melchor Jenkins PA-C      lidocaine  1 patch Topical Daily PRN Reggie Roy PA-C      methocarbamol  750 mg Oral Q6H PRN Melchor Jenkins PA-C      metoprolol succinate  100 mg Oral Daily Melchor Jenkins PA-C      ondansetron  4 mg Intravenous Q4H PRN Annette Flores PA-C      pantoprazole  40 mg Oral BID AC Jonelle Lou PA-C      potassium chloride  20 mEq Oral BID Anuj Sibley PA-C      pravastatin  40 mg Oral Daily With FABIOLA Nesbitt      tamsulosin  0 4 mg Oral Daily With Dinner Melchor Jenkins PA-C         PRN Meds:   acetaminophen    lidocaine    methocarbamol    ondansetron    Continuous Infusions:       Invasive Devices:      Invasive Devices  Report    Peripheral Intravenous Line  Duration           Peripheral IV 05/19/22 Distal;Right;Upper;Ventral (anterior) Arm 2 days                  LABORATORY:    Results from last 7 days   Lab Units 05/20/22  0434 05/19/22  0519 05/18/22  0617 05/17/22  0502 05/16/22  0455 05/15/22  0512   WBC Thousand/uL  --  5 92 5 08  --  6 08 5 92   HEMOGLOBIN g/dL  --  8 4* 8 6* 8 0* 8 6* 8 0*   HEMATOCRIT %  --  27 3* 27 5* 26 0* 27 4* 25 4*   PLATELETS Thousands/uL  --  195 195  --  187 195   POTASSIUM mmol/L 3 6 3 3* 3 5 3 3* 3 4* 3 3*   CHLORIDE mmol/L 103 103 104 101 104 102   CO2 mmol/L 26 27 25 26 27 27   BUN mg/dL 50* 47* 47* 43* 46* 48*   CREATININE mg/dL 5 09* 5 11* 5 15* 5 08* 4 98* 5 21*   CALCIUM mg/dL 8 6 8 9 8 7 8 3 8 8 8 5      rest all reviewed    RADIOLOGY:  XR spine cervical 2 or 3 vw injury   Final Result by Frankie Seay DO (05/20 1588)      Stable postoperative appearance  Workstation performed: [id number]         XR spine thoracolumbar 2 vw   Final Result by Francesco Bejarano MD (05/12 0074)      No acute osseous abnormality  Multilevel degenerative change particularly at L4-5 and L5-S1  Workstation performed: [id number]         IR biopsy kidney columbia kit no laterality    (Results Pending)     Rest all reviewed    Portions of the record may have been created with voice recognition software  Occasional wrong word or "sound a like" substitutions may have occurred due to the inherent limitations of voice recognition software  Read the chart carefully and recognize, using context, where substitutions have occurred  If you have any questions, please contact the dictating provider

## 2022-05-21 NOTE — ASSESSMENT & PLAN NOTE
· S/p cervical fusion performed by Dr Soledad Siegel on 5/39/96, complicated by MRSA infection  · Completed 24 days of IV vancomycin, transitioned to daptomycin on 04/24-4/29    Discharged home on doxycycline 100 mg b i d    · Continue doxycycline

## 2022-05-21 NOTE — PLAN OF CARE
Problem: PAIN - ADULT  Goal: Verbalizes/displays adequate comfort level or baseline comfort level  Description: Interventions:  - Encourage patient to monitor pain and request assistance  - Assess pain using appropriate pain scale  - Administer analgesics based on type and severity of pain and evaluate response  - Implement non-pharmacological measures as appropriate and evaluate response  - Consider cultural and social influences on pain and pain management  - Notify physician/advanced practitioner if interventions unsuccessful or patient reports new pain  Outcome: Progressing     Problem: INFECTION - ADULT  Goal: Absence or prevention of progression during hospitalization  Description: INTERVENTIONS:  - Assess and monitor for signs and symptoms of infection  - Monitor lab/diagnostic results  - Monitor all insertion sites, i e  indwelling lines, tubes, and drains  - Monitor endotracheal if appropriate and nasal secretions for changes in amount and color  - [place] appropriate cooling/warming therapies per order  - Administer medications as ordered  - Instruct and encourage patient and family to use good hand hygiene technique  - Identify and instruct in appropriate isolation precautions for identified infection/condition  Outcome: Progressing     Problem: SAFETY ADULT  Goal: Patient will remain free of falls  Description: INTERVENTIONS:  - Educate patient/family on patient safety including physical limitations  - Instruct patient to call for assistance with activity   - Consult OT/PT to assist with strengthening/mobility   - Keep Call bell within reach  - Keep bed low and locked with side rails adjusted as appropriate  - Keep care items and personal belongings within reach  - Initiate and maintain comfort rounds  - Make Fall Risk Sign visible to staff  - Offer Toileting every 2 Hours, in advance of need  - Initiate/Maintain alarm  - Obtain necessary fall risk management equipment:   - Apply yellow socks and bracelet for high fall risk patients  - Consider moving patient to room near nurses station  Outcome: Progressing  Goal: Maintain or return to baseline ADL function  Description: INTERVENTIONS:  -  Assess patient's ability to carry out ADLs; assess patient's baseline for ADL function and identify physical deficits which impact ability to perform ADLs (bathing, care of mouth/teeth, toileting, grooming, dressing, etc )  - Assess/evaluate cause of self-care deficits   - Assess range of motion  - Assess patient's mobility; develop plan if impaired  - Assess patient's need for assistive devices and provide as appropriate  - Encourage maximum independence but intervene and supervise when necessary  - Involve family in performance of ADLs  - Assess for home care needs following discharge   - Consider OT consult to assist with ADL evaluation and planning for discharge  - Provide patient education as appropriate  Outcome: Progressing  Goal: Maintains/Returns to pre admission functional level  Description: INTERVENTIONS:  - Perform BMAT or MOVE assessment daily    - Set and communicate daily mobility goal to care team and patient/family/caregiver     - Collaborate with rehabilitation services on mobility goals if consulted  - Out of bed for toileting  - Record patient progress and toleration of activity level   Outcome: Progressing     Problem: DISCHARGE PLANNING  Goal: Discharge to home or other facility with appropriate resources  Description: INTERVENTIONS:  - Identify barriers to discharge w/patient and caregiver  - Arrange for needed discharge resources and transportation as appropriate  - Identify discharge learning needs (meds, wound care, etc )  - Arrange for interpretive services to assist at discharge as needed  - Refer to Case Management Department for coordinating discharge planning if the patient needs post-hospital services based on physician/advanced practitioner order or complex needs related to functional status, cognitive ability, or social support system  Outcome: Progressing     Problem: Knowledge Deficit  Goal: Patient/family/caregiver demonstrates understanding of disease process, treatment plan, medications, and discharge instructions  Description: Complete learning assessment and assess knowledge base    Interventions:  - Provide teaching at level of understanding  - Provide teaching via preferred learning methods  Outcome: Progressing     Problem: HEMATOLOGIC - ADULT  Goal: Maintains hematologic stability  Description: INTERVENTIONS  - Assess for signs and symptoms of bleeding or hemorrhage  - Monitor labs  - Administer supportive blood products/factors as ordered and appropriate  Outcome: Progressing     Problem: MUSCULOSKELETAL - ADULT  Goal: Maintain or return mobility to safest level of function  Description: INTERVENTIONS:  - Assess patient's ability to carry out ADLs; assess patient's baseline for ADL function and identify physical deficits which impact ability to perform ADLs (bathing, care of mouth/teeth, toileting, grooming, dressing, etc )  - Assess/evaluate cause of self-care deficits   - Assess range of motion  - Assess patient's mobility  - Assess patient's need for assistive devices and provide as appropriate  - Encourage maximum independence but intervene and supervise when necessary  - Involve family in performance of ADLs  - Assess for home care needs following discharge   - Consider OT consult to assist with ADL evaluation and planning for discharge  - Provide patient education as appropriate  Outcome: Progressing

## 2022-05-21 NOTE — ASSESSMENT & PLAN NOTE
Patient developed gross hematuria during this hospital stay, he has had previously, it is improving as of today 5/20/2022  Continue to hold Coumadin    5/21 denies hematuria

## 2022-05-21 NOTE — PLAN OF CARE
Problem: PAIN - ADULT  Goal: Verbalizes/displays adequate comfort level or baseline comfort level  Description: Interventions:  - Encourage patient to monitor pain and request assistance  - Assess pain using appropriate pain scale  - Administer analgesics based on type and severity of pain and evaluate response  - Implement non-pharmacological measures as appropriate and evaluate response  - Consider cultural and social influences on pain and pain management  - Notify physician/advanced practitioner if interventions unsuccessful or patient reports new pain  Outcome: Progressing     Problem: INFECTION - ADULT  Goal: Absence or prevention of progression during hospitalization  Description: INTERVENTIONS:  - Assess and monitor for signs and symptoms of infection  - Monitor lab/diagnostic results  - Monitor all insertion sites, i e  indwelling lines, tubes, and drains  - Monitor endotracheal if appropriate and nasal secretions for changes in amount and color  - [place] appropriate cooling/warming therapies per order  - Administer medications as ordered  - Instruct and encourage patient and family to use good hand hygiene technique  - Identify and instruct in appropriate isolation precautions for identified infection/condition  Outcome: Progressing     Problem: SAFETY ADULT  Goal: Patient will remain free of falls  Description: INTERVENTIONS:  - Educate patient/family on patient safety including physical limitations  - Instruct patient to call for assistance with activity   - Consult OT/PT to assist with strengthening/mobility   - Keep Call bell within reach  - Keep bed low and locked with side rails adjusted as appropriate  - Keep care items and personal belongings within reach  - Initiate and maintain comfort rounds  - Make Fall Risk Sign visible to staff  - Offer Toileting every 2 Hours, in advance of need  - Initiate/Maintain alarm  - Obtain necessary fall risk management equipment:   - Apply yellow socks and bracelet for high fall risk patients  - Consider moving patient to room near nurses station  Outcome: Progressing  Goal: Maintain or return to baseline ADL function  Description: INTERVENTIONS:  -  Assess patient's ability to carry out ADLs; assess patient's baseline for ADL function and identify physical deficits which impact ability to perform ADLs (bathing, care of mouth/teeth, toileting, grooming, dressing, etc )  - Assess/evaluate cause of self-care deficits   - Assess range of motion  - Assess patient's mobility; develop plan if impaired  - Assess patient's need for assistive devices and provide as appropriate  - Encourage maximum independence but intervene and supervise when necessary  - Involve family in performance of ADLs  - Assess for home care needs following discharge   - Consider OT consult to assist with ADL evaluation and planning for discharge  - Provide patient education as appropriate  Outcome: Progressing  Goal: Maintains/Returns to pre admission functional level  Description: INTERVENTIONS:  - Perform BMAT or MOVE assessment daily    - Set and communicate daily mobility goal to care team and patient/family/caregiver     - Collaborate with rehabilitation services on mobility goals if consulted  - Out of bed for toileting  - Record patient progress and toleration of activity level   Outcome: Progressing     Problem: DISCHARGE PLANNING  Goal: Discharge to home or other facility with appropriate resources  Description: INTERVENTIONS:  - Identify barriers to discharge w/patient and caregiver  - Arrange for needed discharge resources and transportation as appropriate  - Identify discharge learning needs (meds, wound care, etc )  - Arrange for interpretive services to assist at discharge as needed  - Refer to Case Management Department for coordinating discharge planning if the patient needs post-hospital services based on physician/advanced practitioner order or complex needs related to functional status, cognitive ability, or social support system  Outcome: Progressing     Problem: Knowledge Deficit  Goal: Patient/family/caregiver demonstrates understanding of disease process, treatment plan, medications, and discharge instructions  Description: Complete learning assessment and assess knowledge base  Interventions:  - Provide teaching at level of understanding  - Provide teaching via preferred learning methods  Outcome: Progressing     Problem: MOBILITY - ADULT  Goal: Maintain or return to baseline ADL function  Description: INTERVENTIONS:  -  Assess patient's ability to carry out ADLs; assess patient's baseline for ADL function and identify physical deficits which impact ability to perform ADLs (bathing, care of mouth/teeth, toileting, grooming, dressing, etc )  - Assess/evaluate cause of self-care deficits   - Assess range of motion  - Assess patient's mobility; develop plan if impaired  - Assess patient's need for assistive devices and provide as appropriate  - Encourage maximum independence but intervene and supervise when necessary  - Involve family in performance of ADLs  - Assess for home care needs following discharge   - Consider OT consult to assist with ADL evaluation and planning for discharge  - Provide patient education as appropriate  Outcome: Progressing  Goal: Maintains/Returns to pre admission functional level  Description: INTERVENTIONS:  - Perform BMAT or MOVE assessment daily    - Set and communicate daily mobility goal to care team and patient/family/caregiver     - Collaborate with rehabilitation services on mobility goals if consulted  - Out of bed for toileting  - Record patient progress and toleration of activity level   Outcome: Progressing     Problem: Prexisting or High Potential for Compromised Skin Integrity  Goal: Skin integrity is maintained or improved  Description: INTERVENTIONS:  - Identify patients at risk for skin breakdown  - Assess and monitor skin integrity  - Assess and monitor nutrition and hydration status  - Monitor labs   - Assess for incontinence   - Turn and reposition patient  - Assist with mobility/ambulation  - Relieve pressure over bony prominences  - Avoid friction and shearing  - Provide appropriate hygiene as needed including keeping skin clean and dry  - Evaluate need for skin moisturizer/barrier cream  - Collaborate with interdisciplinary team   - Patient/family teaching  - Consider wound care consult   Outcome: Progressing     Problem: Potential for Falls  Goal: Patient will remain free of falls  Description: INTERVENTIONS:  - Educate patient/family on patient safety including physical limitations  - Instruct patient to call for assistance with activity   - Consult OT/PT to assist with strengthening/mobility   - Keep Call bell within reach  - Keep bed low and locked with side rails adjusted as appropriate  - Keep care items and personal belongings within reach  - Initiate and maintain comfort rounds  - Make Fall Risk Sign visible to staff  - Offer Toileting every 2 Hours, in advance of need  - Initiate/Maintain alarm  - Obtain necessary fall risk management equipment:   - Apply yellow socks and bracelet for high fall risk patients  - Consider moving patient to room near nurses station  Outcome: Progressing     Problem: Nutrition/Hydration-ADULT  Goal: Nutrient/Hydration intake appropriate for improving, restoring or maintaining nutritional needs  Description: Monitor and assess patient's nutrition/hydration status for malnutrition  Collaborate with interdisciplinary team and initiate plan and interventions as ordered  Monitor patient's weight and dietary intake as ordered or per policy  Utilize nutrition screening tool and intervene as necessary  Determine patient's food preferences and provide high-protein, high-caloric foods as appropriate       INTERVENTIONS:  - Monitor oral intake, urinary output, labs, and treatment plans  - Assess nutrition and hydration status and recommend course of action  - Evaluate amount of meals eaten  - Assist patient with eating if necessary   - Allow adequate time for meals  - Recommend/ encourage appropriate diets, oral nutritional supplements, and vitamin/mineral supplements  - Order, calculate, and assess calorie counts as needed  - Recommend, monitor, and adjust tube feedings and TPN/PPN based on assessed needs  - Assess need for intravenous fluids  - Provide specific nutrition/hydration education as appropriate  - Include patient/family/caregiver in decisions related to nutrition  Outcome: Progressing     Problem: METABOLIC, FLUID AND ELECTROLYTES - ADULT  Goal: Electrolytes maintained within normal limits  Description: INTERVENTIONS:  - Monitor labs and assess patient for signs and symptoms of electrolyte imbalances  - Administer electrolyte replacement as ordered  - Monitor response to electrolyte replacements, including repeat lab results as appropriate  - Instruct patient on fluid and nutrition as appropriate  Outcome: Progressing  Goal: Fluid balance maintained  Description: INTERVENTIONS:  - Monitor labs   - Monitor I/O and WT  - Instruct patient on fluid and nutrition as appropriate  - Assess for signs & symptoms of volume excess or deficit  Outcome: Progressing      Problem: HEMATOLOGIC - ADULT  Goal: Maintains hematologic stability  Description: INTERVENTIONS  - Assess for signs and symptoms of bleeding or hemorrhage  - Monitor labs  - Administer supportive blood products/factors as ordered and appropriate  Outcome: Progressing     Problem: MUSCULOSKELETAL - ADULT  Goal: Maintain or return mobility to safest level of function  Description: INTERVENTIONS:  - Assess patient's ability to carry out ADLs; assess patient's baseline for ADL function and identify physical deficits which impact ability to perform ADLs (bathing, care of mouth/teeth, toileting, grooming, dressing, etc )  - Assess/evaluate cause of self-care deficits   - Assess range of motion  - Assess patient's mobility  - Assess patient's need for assistive devices and provide as appropriate  - Encourage maximum independence but intervene and supervise when necessary  - Involve family in performance of ADLs  - Assess for home care needs following discharge   - Consider OT consult to assist with ADL evaluation and planning for discharge  - Provide patient education as appropriate  Outcome: Progressing

## 2022-05-22 LAB
ALBUMIN SERPL BCP-MCNC: 2.1 G/DL (ref 3.5–5)
ALP SERPL-CCNC: 88 U/L (ref 46–116)
ALT SERPL W P-5'-P-CCNC: 10 U/L (ref 12–78)
ANION GAP SERPL CALCULATED.3IONS-SCNC: 10 MMOL/L (ref 4–13)
APTT PPP: 20 SECONDS (ref 23–37)
APTT PPP: >210 SECONDS (ref 23–37)
AST SERPL W P-5'-P-CCNC: 14 U/L (ref 5–45)
BILIRUB SERPL-MCNC: 0.6 MG/DL (ref 0.2–1)
BUN SERPL-MCNC: 52 MG/DL (ref 5–25)
CALCIUM ALBUM COR SERPL-MCNC: 10.2 MG/DL (ref 8.3–10.1)
CALCIUM SERPL-MCNC: 8.7 MG/DL (ref 8.3–10.1)
CHLORIDE SERPL-SCNC: 104 MMOL/L (ref 100–108)
CO2 SERPL-SCNC: 25 MMOL/L (ref 21–32)
CREAT SERPL-MCNC: 5.49 MG/DL (ref 0.6–1.3)
ERYTHROCYTE [DISTWIDTH] IN BLOOD BY AUTOMATED COUNT: 14.3 % (ref 11.6–15.1)
GFR SERPL CREATININE-BSD FRML MDRD: 9 ML/MIN/1.73SQ M
GLUCOSE SERPL-MCNC: 98 MG/DL (ref 65–140)
HCT VFR BLD AUTO: 24.8 % (ref 36.5–49.3)
HGB BLD-MCNC: 7.9 G/DL (ref 12–17)
INR PPP: 1.41 (ref 0.84–1.19)
INR PPP: 1.57 (ref 0.84–1.19)
MAGNESIUM SERPL-MCNC: 1.4 MG/DL (ref 1.6–2.6)
MCH RBC QN AUTO: 29.3 PG (ref 26.8–34.3)
MCHC RBC AUTO-ENTMCNC: 31.9 G/DL (ref 31.4–37.4)
MCV RBC AUTO: 92 FL (ref 82–98)
PHOSPHATE SERPL-MCNC: 5.3 MG/DL (ref 2.3–4.1)
PLATELET # BLD AUTO: 174 THOUSANDS/UL (ref 149–390)
PMV BLD AUTO: 10.2 FL (ref 8.9–12.7)
POTASSIUM SERPL-SCNC: 4.2 MMOL/L (ref 3.5–5.3)
PROT SERPL-MCNC: 6.1 G/DL (ref 6.4–8.2)
PROTHROMBIN TIME: 17 SECONDS (ref 11.6–14.5)
PROTHROMBIN TIME: 18.5 SECONDS (ref 11.6–14.5)
RBC # BLD AUTO: 2.7 MILLION/UL (ref 3.88–5.62)
SODIUM SERPL-SCNC: 139 MMOL/L (ref 136–145)
WBC # BLD AUTO: 5.35 THOUSAND/UL (ref 4.31–10.16)

## 2022-05-22 PROCEDURE — 85610 PROTHROMBIN TIME: CPT | Performed by: STUDENT IN AN ORGANIZED HEALTH CARE EDUCATION/TRAINING PROGRAM

## 2022-05-22 PROCEDURE — 80053 COMPREHEN METABOLIC PANEL: CPT | Performed by: INTERNAL MEDICINE

## 2022-05-22 PROCEDURE — 99233 SBSQ HOSP IP/OBS HIGH 50: CPT | Performed by: INTERNAL MEDICINE

## 2022-05-22 PROCEDURE — 83735 ASSAY OF MAGNESIUM: CPT | Performed by: STUDENT IN AN ORGANIZED HEALTH CARE EDUCATION/TRAINING PROGRAM

## 2022-05-22 PROCEDURE — 84100 ASSAY OF PHOSPHORUS: CPT | Performed by: STUDENT IN AN ORGANIZED HEALTH CARE EDUCATION/TRAINING PROGRAM

## 2022-05-22 PROCEDURE — 85027 COMPLETE CBC AUTOMATED: CPT | Performed by: INTERNAL MEDICINE

## 2022-05-22 PROCEDURE — NC001 PR NO CHARGE: Performed by: INTERNAL MEDICINE

## 2022-05-22 PROCEDURE — 85730 THROMBOPLASTIN TIME PARTIAL: CPT | Performed by: STUDENT IN AN ORGANIZED HEALTH CARE EDUCATION/TRAINING PROGRAM

## 2022-05-22 PROCEDURE — 99232 SBSQ HOSP IP/OBS MODERATE 35: CPT | Performed by: STUDENT IN AN ORGANIZED HEALTH CARE EDUCATION/TRAINING PROGRAM

## 2022-05-22 RX ORDER — LIDOCAINE 50 MG/G
2 PATCH TOPICAL DAILY
Status: DISCONTINUED | OUTPATIENT
Start: 2022-05-22 | End: 2022-06-04 | Stop reason: HOSPADM

## 2022-05-22 RX ORDER — HEPARIN SODIUM 1000 [USP'U]/ML
8000 INJECTION, SOLUTION INTRAVENOUS; SUBCUTANEOUS
Status: DISCONTINUED | OUTPATIENT
Start: 2022-05-22 | End: 2022-06-01

## 2022-05-22 RX ORDER — HEPARIN SODIUM 1000 [USP'U]/ML
8000 INJECTION, SOLUTION INTRAVENOUS; SUBCUTANEOUS ONCE
Status: COMPLETED | OUTPATIENT
Start: 2022-05-22 | End: 2022-05-22

## 2022-05-22 RX ORDER — HEPARIN SODIUM 10000 [USP'U]/100ML
3-30 INJECTION, SOLUTION INTRAVENOUS
Status: DISPENSED | OUTPATIENT
Start: 2022-05-22 | End: 2022-05-26

## 2022-05-22 RX ORDER — SODIUM CHLORIDE 9 MG/ML
50 INJECTION, SOLUTION INTRAVENOUS CONTINUOUS
Status: DISCONTINUED | OUTPATIENT
Start: 2022-05-22 | End: 2022-05-23

## 2022-05-22 RX ORDER — HEPARIN SODIUM 1000 [USP'U]/ML
4000 INJECTION, SOLUTION INTRAVENOUS; SUBCUTANEOUS
Status: DISCONTINUED | OUTPATIENT
Start: 2022-05-22 | End: 2022-06-01

## 2022-05-22 RX ORDER — MAGNESIUM SULFATE HEPTAHYDRATE 40 MG/ML
2 INJECTION, SOLUTION INTRAVENOUS ONCE
Status: COMPLETED | OUTPATIENT
Start: 2022-05-22 | End: 2022-05-22

## 2022-05-22 RX ADMIN — AMLODIPINE BESYLATE 10 MG: 5 TABLET ORAL at 09:12

## 2022-05-22 RX ADMIN — LIDOCAINE 5% 2 PATCH: 700 PATCH TOPICAL at 09:22

## 2022-05-22 RX ADMIN — PANTOPRAZOLE SODIUM 40 MG: 40 TABLET, DELAYED RELEASE ORAL at 05:51

## 2022-05-22 RX ADMIN — POTASSIUM CHLORIDE 20 MEQ: 1500 TABLET, EXTENDED RELEASE ORAL at 09:12

## 2022-05-22 RX ADMIN — DOXYCYCLINE 100 MG: 100 CAPSULE ORAL at 21:35

## 2022-05-22 RX ADMIN — MAGNESIUM SULFATE HEPTAHYDRATE 2 G: 2 INJECTION, SOLUTION INTRAVENOUS at 09:12

## 2022-05-22 RX ADMIN — PRAVASTATIN SODIUM 40 MG: 40 TABLET ORAL at 17:50

## 2022-05-22 RX ADMIN — GABAPENTIN 100 MG: 100 CAPSULE ORAL at 09:12

## 2022-05-22 RX ADMIN — DOXAZOSIN 1 MG: 1 TABLET ORAL at 21:35

## 2022-05-22 RX ADMIN — HEPARIN SODIUM 8000 UNITS: 1000 INJECTION INTRAVENOUS; SUBCUTANEOUS at 14:29

## 2022-05-22 RX ADMIN — ACETAMINOPHEN 650 MG: 325 TABLET, FILM COATED ORAL at 21:39

## 2022-05-22 RX ADMIN — GABAPENTIN 300 MG: 300 CAPSULE ORAL at 21:35

## 2022-05-22 RX ADMIN — TAMSULOSIN HYDROCHLORIDE 0.4 MG: 0.4 CAPSULE ORAL at 17:50

## 2022-05-22 RX ADMIN — HEPARIN SODIUM 18 UNITS/KG/HR: 10000 INJECTION, SOLUTION INTRAVENOUS at 14:29

## 2022-05-22 RX ADMIN — PANTOPRAZOLE SODIUM 40 MG: 40 TABLET, DELAYED RELEASE ORAL at 17:50

## 2022-05-22 RX ADMIN — DOCUSATE SODIUM 100 MG: 100 CAPSULE, LIQUID FILLED ORAL at 09:12

## 2022-05-22 RX ADMIN — DOXYCYCLINE 100 MG: 100 CAPSULE ORAL at 09:12

## 2022-05-22 RX ADMIN — DULOXETINE 60 MG: 30 CAPSULE, DELAYED RELEASE ORAL at 09:12

## 2022-05-22 RX ADMIN — SODIUM CHLORIDE 50 ML/HR: 0.9 INJECTION, SOLUTION INTRAVENOUS at 14:29

## 2022-05-22 RX ADMIN — METOPROLOL SUCCINATE 100 MG: 50 TABLET, EXTENDED RELEASE ORAL at 09:12

## 2022-05-22 NOTE — PLAN OF CARE
Problem: PAIN - ADULT  Goal: Verbalizes/displays adequate comfort level or baseline comfort level  Description: Interventions:  - Encourage patient to monitor pain and request assistance  - Assess pain using appropriate pain scale  - Administer analgesics based on type and severity of pain and evaluate response  - Implement non-pharmacological measures as appropriate and evaluate response  - Consider cultural and social influences on pain and pain management  - Notify physician/advanced practitioner if interventions unsuccessful or patient reports new pain  Outcome: Progressing     Problem: INFECTION - ADULT  Goal: Absence or prevention of progression during hospitalization  Description: INTERVENTIONS:  - Assess and monitor for signs and symptoms of infection  - Monitor lab/diagnostic results  - Monitor all insertion sites, i e  indwelling lines, tubes, and drains  - Monitor endotracheal if appropriate and nasal secretions for changes in amount and color  - [place] appropriate cooling/warming therapies per order  - Administer medications as ordered  - Instruct and encourage patient and family to use good hand hygiene technique  - Identify and instruct in appropriate isolation precautions for identified infection/condition  Outcome: Progressing     Problem: SAFETY ADULT  Goal: Patient will remain free of falls  Description: INTERVENTIONS:  - Educate patient/family on patient safety including physical limitations  - Instruct patient to call for assistance with activity   - Consult OT/PT to assist with strengthening/mobility   - Keep Call bell within reach  - Keep bed low and locked with side rails adjusted as appropriate  - Keep care items and personal belongings within reach  - Initiate and maintain comfort rounds  - Make Fall Risk Sign visible to staff  - Offer Toileting every 2 Hours, in advance of need  - Initiate/Maintain alarm  - Obtain necessary fall risk management equipment:   - Apply yellow socks and bracelet for high fall risk patients  - Consider moving patient to room near nurses station  Outcome: Progressing  Goal: Maintain or return to baseline ADL function  Description: INTERVENTIONS:  -  Assess patient's ability to carry out ADLs; assess patient's baseline for ADL function and identify physical deficits which impact ability to perform ADLs (bathing, care of mouth/teeth, toileting, grooming, dressing, etc )  - Assess/evaluate cause of self-care deficits   - Assess range of motion  - Assess patient's mobility; develop plan if impaired  - Assess patient's need for assistive devices and provide as appropriate  - Encourage maximum independence but intervene and supervise when necessary  - Involve family in performance of ADLs  - Assess for home care needs following discharge   - Consider OT consult to assist with ADL evaluation and planning for discharge  - Provide patient education as appropriate  Outcome: Progressing  Goal: Maintains/Returns to pre admission functional level  Description: INTERVENTIONS:  - Perform BMAT or MOVE assessment daily    - Set and communicate daily mobility goal to care team and patient/family/caregiver     - Collaborate with rehabilitation services on mobility goals if consulted  - Out of bed for toileting  - Record patient progress and toleration of activity level   Outcome: Progressing     Problem: DISCHARGE PLANNING  Goal: Discharge to home or other facility with appropriate resources  Description: INTERVENTIONS:  - Identify barriers to discharge w/patient and caregiver  - Arrange for needed discharge resources and transportation as appropriate  - Identify discharge learning needs (meds, wound care, etc )  - Arrange for interpretive services to assist at discharge as needed  - Refer to Case Management Department for coordinating discharge planning if the patient needs post-hospital services based on physician/advanced practitioner order or complex needs related to functional status, cognitive ability, or social support system  Outcome: Progressing     Problem: Knowledge Deficit  Goal: Patient/family/caregiver demonstrates understanding of disease process, treatment plan, medications, and discharge instructions  Description: Complete learning assessment and assess knowledge base  Interventions:  - Provide teaching at level of understanding  - Provide teaching via preferred learning methods  Outcome: Progressing     Problem: MOBILITY - ADULT  Goal: Maintain or return to baseline ADL function  Description: INTERVENTIONS:  -  Assess patient's ability to carry out ADLs; assess patient's baseline for ADL function and identify physical deficits which impact ability to perform ADLs (bathing, care of mouth/teeth, toileting, grooming, dressing, etc )  - Assess/evaluate cause of self-care deficits   - Assess range of motion  - Assess patient's mobility; develop plan if impaired  - Assess patient's need for assistive devices and provide as appropriate  - Encourage maximum independence but intervene and supervise when necessary  - Involve family in performance of ADLs  - Assess for home care needs following discharge   - Consider OT consult to assist with ADL evaluation and planning for discharge  - Provide patient education as appropriate  Outcome: Progressing  Goal: Maintains/Returns to pre admission functional level  Description: INTERVENTIONS:  - Perform BMAT or MOVE assessment daily    - Set and communicate daily mobility goal to care team and patient/family/caregiver     - Collaborate with rehabilitation services on mobility goals if consulted  - Out of bed for toileting  - Record patient progress and toleration of activity level   Outcome: Progressing     Problem: Prexisting or High Potential for Compromised Skin Integrity  Goal: Skin integrity is maintained or improved  Description: INTERVENTIONS:  - Identify patients at risk for skin breakdown  - Assess and monitor skin integrity  - Assess and monitor nutrition and hydration status  - Monitor labs   - Assess for incontinence   - Turn and reposition patient  - Assist with mobility/ambulation  - Relieve pressure over bony prominences  - Avoid friction and shearing  - Provide appropriate hygiene as needed including keeping skin clean and dry  - Evaluate need for skin moisturizer/barrier cream  - Collaborate with interdisciplinary team   - Patient/family teaching  - Consider wound care consult   Outcome: Progressing     Problem: Potential for Falls  Goal: Patient will remain free of falls  Description: INTERVENTIONS:  - Educate patient/family on patient safety including physical limitations  - Instruct patient to call for assistance with activity   - Consult OT/PT to assist with strengthening/mobility   - Keep Call bell within reach  - Keep bed low and locked with side rails adjusted as appropriate  - Keep care items and personal belongings within reach  - Initiate and maintain comfort rounds  - Make Fall Risk Sign visible to staff  - Offer Toileting every 2 Hours, in advance of need  - Initiate/Maintain alarm  - Obtain necessary fall risk management equipment:   - Apply yellow socks and bracelet for high fall risk patients  - Consider moving patient to room near nurses station  Outcome: Progressing     Problem: Nutrition/Hydration-ADULT  Goal: Nutrient/Hydration intake appropriate for improving, restoring or maintaining nutritional needs  Description: Monitor and assess patient's nutrition/hydration status for malnutrition  Collaborate with interdisciplinary team and initiate plan and interventions as ordered  Monitor patient's weight and dietary intake as ordered or per policy  Utilize nutrition screening tool and intervene as necessary  Determine patient's food preferences and provide high-protein, high-caloric foods as appropriate       INTERVENTIONS:  - Monitor oral intake, urinary output, labs, and treatment plans  - Assess nutrition and hydration status and recommend course of action  - Evaluate amount of meals eaten  - Assist patient with eating if necessary   - Allow adequate time for meals  - Recommend/ encourage appropriate diets, oral nutritional supplements, and vitamin/mineral supplements  - Order, calculate, and assess calorie counts as needed  - Recommend, monitor, and adjust tube feedings and TPN/PPN based on assessed needs  - Assess need for intravenous fluids  - Provide specific nutrition/hydration education as appropriate  - Include patient/family/caregiver in decisions related to nutrition  Outcome: Progressing     Problem: METABOLIC, FLUID AND ELECTROLYTES - ADULT  Goal: Electrolytes maintained within normal limits  Description: INTERVENTIONS:  - Monitor labs and assess patient for signs and symptoms of electrolyte imbalances  - Administer electrolyte replacement as ordered  - Monitor response to electrolyte replacements, including repeat lab results as appropriate  - Instruct patient on fluid and nutrition as appropriate  Outcome: Progressing  Goal: Fluid balance maintained  Description: INTERVENTIONS:  - Monitor labs   - Monitor I/O and WT  - Instruct patient on fluid and nutrition as appropriate  - Assess for signs & symptoms of volume excess or deficit  Outcome: Progressing     Problem: MUSCULOSKELETAL - ADULT  Goal: Maintain or return mobility to safest level of function  Description: INTERVENTIONS:  - Assess patient's ability to carry out ADLs; assess patient's baseline for ADL function and identify physical deficits which impact ability to perform ADLs (bathing, care of mouth/teeth, toileting, grooming, dressing, etc )  - Assess/evaluate cause of self-care deficits   - Assess range of motion  - Assess patient's mobility  - Assess patient's need for assistive devices and provide as appropriate  - Encourage maximum independence but intervene and supervise when necessary  - Involve family in performance of ADLs  - Assess for home care needs following discharge   - Consider OT consult to assist with ADL evaluation and planning for discharge  - Provide patient education as appropriate  Outcome: Progressing

## 2022-05-22 NOTE — ASSESSMENT & PLAN NOTE
Wt Readings from Last 3 Encounters:   05/22/22 102 kg (224 lb)   05/03/22 114 kg (251 lb)   04/29/22 113 kg (250 lb 1 6 oz)     · Prior echo 04/25/2022:  EF 32%, Diastolic function: grade 2 pseudonormal relaxation  · Monitor I/O and daily weights  · Does not take outpatient diuretics  IV Lasix with minimal results during prior admission    Patient had received albumin to help with anasarca  · Fluid restriction, 2 g sodium

## 2022-05-22 NOTE — ASSESSMENT & PLAN NOTE
Patient developed gross hematuria during this hospital stay, he has had previously, it is improving as of today 5/20/2022  Continue to hold Coumadin    5/21 denies hematuria  resolved

## 2022-05-22 NOTE — ASSESSMENT & PLAN NOTE
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

## 2022-05-22 NOTE — ASSESSMENT & PLAN NOTE
· Diagnosed during prior admission    · Postinfectious suspected  · Likely plan for renal biopsy on Monday and permacath given worsening cr

## 2022-05-22 NOTE — ASSESSMENT & PLAN NOTE
Patient has PAF  He denies palpitations    Patient maintained sinus rhythm    Continue Toprol    INR subtherapeutic 1 5  Continue holding warfarin before possible kidney biopsy, will consider giving vitamin K    Start IV heparin infusion/bridge and stop at 4 AM for anticipated biopsy

## 2022-05-22 NOTE — ASSESSMENT & PLAN NOTE
· S/p cervical fusion performed by Dr Richad Lesches on 4/66/51, complicated by MRSA infection  · Completed 24 days of IV vancomycin, transitioned to daptomycin on 04/24-4/29    Discharged home on doxycycline 100 mg b i d    · Continue doxycycline

## 2022-05-22 NOTE — PLAN OF CARE
Problem: PAIN - ADULT  Goal: Verbalizes/displays adequate comfort level or baseline comfort level  Description: Interventions:  - Encourage patient to monitor pain and request assistance  - Assess pain using appropriate pain scale  - Administer analgesics based on type and severity of pain and evaluate response  - Implement non-pharmacological measures as appropriate and evaluate response  - Consider cultural and social influences on pain and pain management  - Notify physician/advanced practitioner if interventions unsuccessful or patient reports new pain  Outcome: Progressing     Problem: INFECTION - ADULT  Goal: Absence or prevention of progression during hospitalization  Description: INTERVENTIONS:  - Assess and monitor for signs and symptoms of infection  - Monitor lab/diagnostic results  - Monitor all insertion sites, i e  indwelling lines, tubes, and drains  - Monitor endotracheal if appropriate and nasal secretions for changes in amount and color  - [place] appropriate cooling/warming therapies per order  - Administer medications as ordered  - Instruct and encourage patient and family to use good hand hygiene technique  - Identify and instruct in appropriate isolation precautions for identified infection/condition  Outcome: Progressing

## 2022-05-22 NOTE — ASSESSMENT & PLAN NOTE
Patient admitted with acute kidney injury felt to be most likely due to post infectious  glomerulonephritis        Plan for renal bx tomorrow  Discussed with nephro who placed IR consult for permacath due to worsening Cr  NPO at midnight  Nephro gave 500cc IVF  given worsening Cr 5 -> 5 4

## 2022-05-22 NOTE — PROGRESS NOTES
[place]  Progress Note - Emmanuel Ramos 1950, 71 y o  male MRN: [id number]  Unit/Bed#: [facility]-01 Encounter: [id number]  Primary Care Provider: Richar Cheng MD   Date and time admitted to hospital: 5/10/2022  6:51 PM    * JANEL (acute kidney injury) [facility])  Assessment & Plan  Patient admitted with acute kidney injury felt to be most likely due to post infectious  glomerulonephritis  Plan for renal bx tomorrow  Discussed with nephro who placed IR consult for permacath due to worsening Cr  NPO at midnight  Nephro gave 500cc IVF  given worsening Cr 5 -> 5 4    Glomerulonephritis  Assessment & Plan  · Diagnosed during prior admission    · Postinfectious suspected  · Likely plan for renal biopsy on Monday and permacath given worsening cr    Factor V Leiden mutation [facility])  Assessment & Plan  Patient has history of left lower extremity DVT after which he was diagnosed with factor 5 Leiden mutation  This happened years ago  Holding Coumadin    Start IV heparin infusion/bridge and stop at 4 AM for anticipated biopsy    Paroxysmal A-fib [facility])  Assessment & Plan  Patient has PAF  He denies palpitations    Patient maintained sinus rhythm    Continue Toprol    INR subtherapeutic 1 5  Continue holding warfarin before possible kidney biopsy, will consider giving vitamin K    Start IV heparin infusion/bridge and stop at 4 AM for anticipated biopsy    Gross hematuria  Assessment & Plan  Patient developed gross hematuria during this hospital stay, he has had previously, it is improving as of today 5/20/2022  Continue to hold Coumadin    5/21 denies hematuria  resolved    Chronic diastolic (congestive) heart failure [facility])  Assessment & Plan  Wt Readings from Last 3 Encounters:   05/22/22 102 kg (224 lb)   05/03/22 114 kg (251 lb)   04/29/22 113 kg (250 lb 1 6 oz)     · Prior echo 04/25/2022:  EF 82%, Diastolic function: grade 2 pseudonormal relaxation  · Monitor I/O and daily weights  · Does not take outpatient diuretics  IV Lasix with minimal results during prior admission  Patient had received albumin to help with anasarca  · Fluid restriction, 2 g sodium    Surgical site infection  Assessment & Plan  · S/p cervical fusion performed by Dr Annette Caal on 7/82/75, complicated by MRSA infection  · Completed 24 days of IV vancomycin, transitioned to daptomycin on 04/24-4/29  Discharged home on doxycycline 100 mg b i d    · Continue doxycycline      Anemia  Assessment & Plan  Patient has chronic anemia  He denies signs of GI  bleeding  Hemoglobin is 7 9 today, stable  Mixed hyperlipidemia  Assessment & Plan  · Continue statin    Depression, major, single episode, moderate (HCC)  Assessment & Plan  · Continue Cymbalta    WILL (obstructive sleep apnea)  Assessment & Plan  · CPAP HS - patient brought home machine in    Essential hypertension  Assessment & Plan  · Home regimen:  Amlodipine 5 mg daily and metoprolol succinate 100 mg daily    Hypoalbuminemia-resolved as of 5/12/2022  Assessment & Plan  · Albumin 2 1 on admission   · Patient with bilateral lower extremity swelling  Suspect anasarca   · Ordered 1 dose of IV albumin  · Nephrology consulted          VTE Pharmacologic Prophylaxis: VTE Score: 3 Moderate Risk (Score 3-4) - Pharmacological DVT Prophylaxis Ordered: heparin drip  Patient Centered Rounds: I performed bedside rounds with nursing staff today  Discussions with Specialists or Other Care Team Provider: nephro    Education and Discussions with Family / Patient: Patient declined call to   Time Spent for Care: 30 minutes  More than 50% of total time spent on counseling and coordination of care as described above      Current Length of Stay: 12 day(s)  Current Patient Status: Inpatient   Certification Statement: The patient will continue to require additional inpatient hospital stay due to bx tomorrow and permacath  Discharge Plan: Anticipate discharge in >72 hrs to discharge location to be determined pending rehab evaluations  Code Status: Level 3 - DNAR and DNI    Subjective:   Patient seen examined at bedside  Currently offers no complaints  States that he still is peeing clear yellow urine denies having hematuria over last 48 hours    Objective:     Vitals:   Temp (24hrs), Av 4 °F (36 3 °C), Min:97 3 °F (36 3 °C), Max:97 6 °F (36 4 °C)    Temp:  [97 3 °F (36 3 °C)-97 6 °F (36 4 °C)] 97 4 °F (36 3 °C)  HR:  [57-61] 60  Resp:  [16-18] 18  BP: (144-154)/(65-75) 154/73  SpO2:  [95 %-98 %] 95 %  Body mass index is 31 24 kg/m²  Input and Output Summary (last 24 hours): Intake/Output Summary (Last 24 hours) at 2022 1355  Last data filed at 2022 0912  Gross per 24 hour   Intake 462 ml   Output 1775 ml   Net -1313 ml       Physical Exam:   Physical Exam  Vitals reviewed  HENT:      Head: Normocephalic and atraumatic  Right Ear: External ear normal       Left Ear: External ear normal       Nose: Nose normal       Mouth/Throat:      Mouth: Mucous membranes are moist       Pharynx: Oropharynx is clear  Eyes:      Extraocular Movements: Extraocular movements intact  Cardiovascular:      Rate and Rhythm: Normal rate and regular rhythm  Pulses: Normal pulses  Heart sounds: Normal heart sounds  Pulmonary:      Effort: Pulmonary effort is normal       Breath sounds: Normal breath sounds  Abdominal:      General: Abdomen is flat  Palpations: Abdomen is soft  Tenderness: There is no abdominal tenderness  Musculoskeletal:      Cervical back: Normal range of motion  Right lower leg: No edema  Left lower leg: No edema  Skin:     General: Skin is warm and dry  Neurological:      General: No focal deficit present  Mental Status: He is alert  Mental status is at baseline     Psychiatric:         Mood and Affect: Mood normal          Behavior: Behavior normal           Additional Data:     Labs:  Results from last 7 days   Lab Units 05/22/22  0608 05/17/22  0502 05/16/22  0455   WBC Thousand/uL 5 35   < > 6 08   HEMOGLOBIN g/dL 7 9*   < > 8 6*   HEMATOCRIT % 24 8*   < > 27 4*   PLATELETS Thousands/uL 174   < > 187   NEUTROS PCT %  --   --  53   LYMPHS PCT %  --   --  31   MONOS PCT %  --   --  10   EOS PCT %  --   --  5    < > = values in this interval not displayed       Results from last 7 days   Lab Units 05/22/22  0608   SODIUM mmol/L 139   POTASSIUM mmol/L 4 2   CHLORIDE mmol/L 104   CO2 mmol/L 25   BUN mg/dL 52*   CREATININE mg/dL 5 49*   ANION GAP mmol/L 10   CALCIUM mg/dL 8 7   ALBUMIN g/dL 2 1*   TOTAL BILIRUBIN mg/dL 0 60   ALK PHOS U/L 88   ALT U/L 10*   AST U/L 14   GLUCOSE RANDOM mg/dL 98     Results from last 7 days   Lab Units 05/22/22  0608   INR  1 57*                   Lines/Drains:  Invasive Devices  Report    Peripheral Intravenous Line  Duration           Peripheral IV 05/19/22 Distal;Right;Upper;Ventral (anterior) Arm 3 days                      Imaging: Reviewed radiology reports from this admission including: xray(s)    Recent Cultures (last 7 days):         Last 24 Hours Medication List:   Current Facility-Administered Medications   Medication Dose Route Frequency Provider Last Rate    acetaminophen  650 mg Oral Q6H PRN [facility] FABIOLA Nichole      amLODIPine  10 mg Oral Daily Michelle Huffman PA-C      docusate sodium  100 [facility] Daily [facility]      doxazosin  1 mg Oral HS Nick Lantigua MD      doxycycline hyclate  100 mg Oral Q12H [address] [facility]ndrickson      DULoxetine  60 mg Oral Daily [facility]      gabapentin  100 mg Oral Daily [facility]      gabapentin  300 mg Oral HS [facility] hendricks[facility]      heparin   Intravenous Once Judsoni Tostacey, DO      lidocaine  1 patch Topical Daily PRN Annette Flores PA-C      lidocaine  2 patch Topical Daily Mak Farmer, DO      methocarbamol  750 mg Oral Q6H PRN [facility] FABIOLA Nichole      metoprolol succinate  100 mg Oral Daily [facility] FABIOLA Nichole  ondansetron  4 mg Intravenous Q4H PRN Annette Flores PA-C      pantoprazole  40 mg Oral BID AC Jonelle Lou PA-C      pravastatin  40 mg Oral Daily With FABIOLA Nesbitt      sodium chloride  50 mL/hr Intravenous Continuous Savanah Kitchen MD      tamsulosin  0 4 mg Oral Daily With FABIOLA Nesbitt          Today, Patient Was Seen By: Lisa Quiles DO    **Please Note: This note may have been constructed using a voice recognition system  **

## 2022-05-22 NOTE — TELEMEDICINE
e-Consult (IPC)  - Interventional Radiology  Kavita Magdaleno 71 y o  male MRN: [id number]  Unit/Bed#: -01 Encounter: [id number]          Interventional Radiology has been consulted to evaluate Kasandra Corbin 31 to IR  Consult performed by: Lisa Elias MD  Consult ordered by: Karen Obando MD        05/22/22    Assessment/Recommendation:     71year old male with worsening JANEL, possibly post infectious glomerulonephritis  Renal biopsy planned for 5/23  Plan for tunneled dialysis catheter placement, if needed at that time per nephrology recs  Labs/coags reviewed  Total time spent in review of data, discussion with requesting provider and rendering advice was 15 min     Thank you for allowing Interventional Radiology to participate in the care of Kavita Magdaleno  Please don't hesitate to call or TigerText us with any questions       Lisa Elias MD

## 2022-05-22 NOTE — PROGRESS NOTES
Follow up Consultation    Nephrology   Mukund Larkin 71 y o  male MRN: [id number]  Unit/Bed#: -01 Encounter: [id number]      Physician Requesting Consult: Mary Kate Whelan DO        ASSESSMENT/PLAN:  81-year-old male with multiple comorbidities including CKD, DVT, factor 5 laden deficiency, CHF status post cervical fusion in March complicated with MRSA surgical in flexion and osteomyelitis along with acute kidney injury in April 2022 now admitted with nausea and worsening renal parameters  Nephrology following for acute kidney injury  Acute kidney injury (POA) on CKD: JANEL multifactorial most likely secondary to incomplete recovery from prior episode of acute kidney injury in April 2022 possibly post infectious GN and subsequent ATN  After review of records In [facility] as well as Care everywhere it appears that the patient has a baseline Creatinine of 0 6-0 9 mg/dL  patient was admitted with a creatinine of 5 04 mg/dL  patient's creatinine today is at 5 49 mg/dL, unfortunately elevated/worse from prior   Renal ultrasound with no hydronephrosis  See detailed note from 05/20/2022 with regards to negative serological workup  Plan initially was to hold off on renal biopsy due to risks involved with patient being on anticoagulation however subsequent to that it was finalized that patient would undergo renal biopsy on 05/23/2022 as long as INR is acceptable  Today's INR is down to 1 5  Biopsy forms have been sent for [facility] by [facility]  Patient is to be NPO Sunday night for potential renal biopsy  I did discuss with the patient that if his renal parameters continue to deteriorate and if he becomes symptomatic then he may need an access for dialysis to be placed such as a PermCath  IR consult placed for possible PermCath placement also on 05/23/2022 if renal parameters continued to deteriorate in a m  Discussed with IR    Will give trial of IV fluids with normal saline at 50 cc an hour for 500 cc then DC  No acute indication for dialysis at this time  check BMP in a m  Await renal recovery  No overt uremic symptoms at this time monitor closely for dialysis needs  Optimize hemodynamic status to avoid delay in renal recovery  Place on a renal diet when allowed diet order  Avoid nephrotoxins, adjust meds to appropriate GFR  Strict I/O  Daily weights  Urinary retention protocol if patient does not have a Lam  will need to set up patient for follow up with Nephrology as an outpatient post hospitalization  for nephrology as an outpatient patient follows up with Dr Farshad Linder  However would prefer to be seen in [facility]    Blood pressure/hypertension:  current medications:  Norvasc 10 mg p o  Q day, Cardura 1 mg p o  Q h s  Toprol- mg p o  Q day  recommendations:  No changes for now continue to monitor  Continue to Hold further Lasix for now patient with good urine output likely in diuretic phase  Optimize hemodynamics  Maintain MAP > 65mmHg  Avoid BP fluctuations  H/H/anemia:  most recent hemoglobin at 7 9 grams/deciliter  maintain hemoglobin greater than 8 grams/deciliter    Acid-base electrolytes:    Electrolytes:      Hypokalemia:  Most recent potassium 4 2 stable  Will discontinue potassium supplementation for now in light of renal parameters deteriorating concern for iatrogenic hyperkalemia    Hyperphosphatemia:  Most recent phosphorus 5 5  Continue to monitor for now if continues to be elevated may need to be started on binders  Acid-base:    Most recent bicarb stable at 25    Other medical problems:  Nephrotic range Proteinuria: Most recent protein creatinine ratio improving down to 12 4 g  History of coagulopathy disorder:  Management per primary team   Holding oral anticoagulation and bridging with heparin due to plans for renal biopsy on Monday  Microscopic hematuria:  Follow-up with Urology  Negative serologies thus far        Thanks for the consult  Will continue to follow  Please call with questions/ concerns  Above-mentioned orders and Plan in terms of acute kidney injury was discussed with the team in depth via [facility] text    Yee June MD, FASN, 2022, 11:47 AM              Objective :   Patient seen and examined in his room hemodynamically stable remains afebrile urine output 1 6 L last 24 hours and thus far this a m  775 cc  Spoke with spouse while in the room on the telephone  Discussed plan from renal standpoint discussed with regards to possible need for PermCath for dialysis in case renal parameters continue to deteriorate  PHYSICAL EXAM  /73   Pulse 60   Temp (!) 97 4 °F (36 3 °C)   Resp 18   Ht 5' 11" (1 803 m)   Wt 102 kg (224 lb)   SpO2 95%   BMI 31 24 kg/m²   Temp (24hrs), Av 4 °F (36 3 °C), Min:97 3 °F (36 3 °C), Max:97 6 °F (36 4 °C)        Intake/Output Summary (Last 24 hours) at 2022 1147  Last data filed at 2022 0912  Gross per 24 hour   Intake 582 ml   Output 2175 ml   Net -1593 ml       I/O last 24 hours: In: 632 [P O :803]  Out: 2425 [Urine:2425]      Current Weight: Weight - Scale: 102 kg (224 lb)  First Weight: Weight - Scale: 114 kg (251 lb 5 2 oz)  Physical Exam  Vitals and nursing note reviewed  Constitutional:       General: He is not in acute distress  Appearance: Normal appearance  He is obese  He is not ill-appearing, toxic-appearing or diaphoretic  HENT:      Head: Normocephalic and atraumatic  Mouth/Throat:      Mouth: Mucous membranes are dry  Pharynx: Oropharynx is clear  No oropharyngeal exudate  Eyes:      General: No scleral icterus  Conjunctiva/sclera: Conjunctivae normal    Cardiovascular:      Rate and Rhythm: Normal rate  Heart sounds: Normal heart sounds  No friction rub  Pulmonary:      Effort: Pulmonary effort is normal  No respiratory distress  Breath sounds: Normal breath sounds  No stridor  No wheezing  Abdominal:      General: There is no distension        Palpations: Abdomen is soft  There is no mass  Tenderness: There is no abdominal tenderness  Musculoskeletal:         General: No swelling  Cervical back: Normal range of motion and neck supple  No rigidity  Skin:     General: Skin is warm and dry  Coloration: Skin is not jaundiced  Neurological:      General: No focal deficit present  Mental Status: He is alert and oriented to person, place, and time  Mental status is at baseline  Psychiatric:         Mood and Affect: Mood normal          Behavior: Behavior normal              Review of Systems   Constitutional: Negative for appetite change, chills, fatigue and fever  HENT: Negative for congestion  Respiratory: Negative for cough, shortness of breath and wheezing  Cardiovascular: Negative for leg swelling  Gastrointestinal: Negative for abdominal pain, constipation, diarrhea, nausea and vomiting  Genitourinary: Positive for hematuria  Improving clearing up of urine   Musculoskeletal: Negative for back pain  Neurological: Negative for dizziness and headaches  Psychiatric/Behavioral: Negative for confusion  All other systems reviewed and are negative        Scheduled Meds:  Current Facility-Administered Medications   Medication Dose Route Frequency Provider Last Rate    acetaminophen  650 mg Oral Q6H PRN Leticia El PA-C      amLODIPine  10 mg Oral Daily Kendrick Carbone PA-C      docusate sodium  100 mg Oral Daily Leticia El PA-C      doxazosin  1 mg Oral HS Romero Gonsalves MD      doxycycline hyclate  100 mg Oral Q12H [address] Leticia El PA-C      DULoxetine  60 mg Oral Daily Leticia El PA-C      gabapentin  100 mg Oral Daily Leticia El PA-C      gabapentin  300 mg Oral HS Leticia El PA-C      lidocaine  1 patch Topical Daily PRN Alem Cameron PA-C      lidocaine  2 patch Topical Daily Mak Farmer DO      methocarbamol  750 mg Oral Q6H PRN Leticia El PA-C      metoprolol succinate  100 mg Oral Daily Jonelle FABIOLA Lou      ondansetron  4 mg Intravenous Q4H PRN Stephanie Paniagua PA-C      pantoprazole  40 mg Oral BID AC Jonelle Lou PA-C      potassium chloride  20 mEq Oral BID Vadim Salcedo PA-C      pravastatin  40 mg Oral Daily With FABIOLA Nesbitt      tamsulosin  0 4 mg Oral Daily With Dinner López Walker PA-C         PRN Meds:   acetaminophen    lidocaine    methocarbamol    ondansetron    Continuous Infusions:       Invasive Devices: Invasive Devices  Report    Peripheral Intravenous Line  Duration           Peripheral IV 05/19/22 Distal;Right;Upper;Ventral (anterior) Arm 3 days                  LABORATORY:    Results from last 7 days   Lab Units 05/22/22  0608 05/20/22  0434 05/19/22  0519 05/18/22  0617 05/17/22  0502 05/16/22  0455   WBC Thousand/uL 5 35  --  5 92 5 08  --  6 08   HEMOGLOBIN g/dL 7 9*  --  8 4* 8 6* 8 0* 8 6*   HEMATOCRIT % 24 8*  --  27 3* 27 5* 26 0* 27 4*   PLATELETS Thousands/uL 174  --  195 195  --  187   POTASSIUM mmol/L 4 2 3 6 3 3* 3 5 3 3* 3 4*   CHLORIDE mmol/L 104 103 103 104 101 104   CO2 mmol/L 25 26 27 25 26 27   BUN mg/dL 52* 50* 47* 47* 43* 46*   CREATININE mg/dL 5 49* 5 09* 5 11* 5 15* 5 08* 4 98*   CALCIUM mg/dL 8 7 8 6 8 9 8 7 8 3 8 8   MAGNESIUM mg/dL 1 4*  --   --   --   --   --    PHOSPHORUS mg/dL 5 3*  --   --   --   --   --       rest all reviewed    RADIOLOGY:  XR spine cervical 2 or 3 vw injury   Final Result by Tarun Saenz DO (05/20 7507)      Stable postoperative appearance  Workstation performed: [id number]         XR spine thoracolumbar 2 vw   Final Result by Jaxon Bains MD (05/12 0041)      No acute osseous abnormality  Multilevel degenerative change particularly at L4-5 and L5-S1  Workstation performed: [id number]         IR biopsy kidney columbia kit no laterality    (Results Pending)     Rest all reviewed    Portions of the record may have been created with voice recognition software   Occasional wrong word or "sound a like" substitutions may have occurred due to the inherent limitations of voice recognition software  Read the chart carefully and recognize, using context, where substitutions have occurred  If you have any questions, please contact the dictating provider

## 2022-05-23 ENCOUNTER — APPOINTMENT (INPATIENT)
Dept: INTERVENTIONAL RADIOLOGY/VASCULAR | Facility: HOSPITAL | Age: 72
DRG: 674 | End: 2022-05-23
Attending: INTERNAL MEDICINE
Payer: COMMERCIAL

## 2022-05-23 ENCOUNTER — PATIENT OUTREACH (OUTPATIENT)
Dept: FAMILY MEDICINE CLINIC | Facility: CLINIC | Age: 72
End: 2022-05-23

## 2022-05-23 LAB
ALBUMIN SERPL BCP-MCNC: 1.8 G/DL (ref 3.5–5)
ALP SERPL-CCNC: 78 U/L (ref 46–116)
ALT SERPL W P-5'-P-CCNC: 11 U/L (ref 12–78)
ANION GAP SERPL CALCULATED.3IONS-SCNC: 11 MMOL/L (ref 4–13)
APTT PPP: 109 SECONDS (ref 23–37)
APTT PPP: 208 SECONDS (ref 23–37)
AST SERPL W P-5'-P-CCNC: 14 U/L (ref 5–45)
BILIRUB SERPL-MCNC: 0.5 MG/DL (ref 0.2–1)
BUN SERPL-MCNC: 51 MG/DL (ref 5–25)
CALCIUM ALBUM COR SERPL-MCNC: 10 MG/DL (ref 8.3–10.1)
CALCIUM SERPL-MCNC: 8.2 MG/DL (ref 8.3–10.1)
CHLORIDE SERPL-SCNC: 104 MMOL/L (ref 100–108)
CO2 SERPL-SCNC: 24 MMOL/L (ref 21–32)
CREAT SERPL-MCNC: 5.26 MG/DL (ref 0.6–1.3)
ERYTHROCYTE [DISTWIDTH] IN BLOOD BY AUTOMATED COUNT: 14.3 % (ref 11.6–15.1)
GFR SERPL CREATININE-BSD FRML MDRD: 10 ML/MIN/1.73SQ M
GLUCOSE SERPL-MCNC: 90 MG/DL (ref 65–140)
HCT VFR BLD AUTO: 23.8 % (ref 36.5–49.3)
HGB BLD-MCNC: 7.4 G/DL (ref 12–17)
INR PPP: 1.47 (ref 0.84–1.19)
MAGNESIUM SERPL-MCNC: 1.5 MG/DL (ref 1.6–2.6)
MCH RBC QN AUTO: 28.2 PG (ref 26.8–34.3)
MCHC RBC AUTO-ENTMCNC: 31.1 G/DL (ref 31.4–37.4)
MCV RBC AUTO: 91 FL (ref 82–98)
PHOSPHATE SERPL-MCNC: 5.2 MG/DL (ref 2.3–4.1)
PLATELET # BLD AUTO: 180 THOUSANDS/UL (ref 149–390)
PMV BLD AUTO: 10.8 FL (ref 8.9–12.7)
POTASSIUM SERPL-SCNC: 3.5 MMOL/L (ref 3.5–5.3)
PROT SERPL-MCNC: 5.4 G/DL (ref 6.4–8.2)
PROTHROMBIN TIME: 17.6 SECONDS (ref 11.6–14.5)
RBC # BLD AUTO: 2.62 MILLION/UL (ref 3.88–5.62)
SODIUM SERPL-SCNC: 139 MMOL/L (ref 136–145)
WBC # BLD AUTO: 5.97 THOUSAND/UL (ref 4.31–10.16)

## 2022-05-23 PROCEDURE — 77001 FLUOROGUIDE FOR VEIN DEVICE: CPT | Performed by: RADIOLOGY

## 2022-05-23 PROCEDURE — 0JH63XZ INSERTION OF TUNNELED VASCULAR ACCESS DEVICE INTO CHEST SUBCUTANEOUS TISSUE AND FASCIA, PERCUTANEOUS APPROACH: ICD-10-PCS | Performed by: INTERNAL MEDICINE

## 2022-05-23 PROCEDURE — 76937 US GUIDE VASCULAR ACCESS: CPT

## 2022-05-23 PROCEDURE — 85610 PROTHROMBIN TIME: CPT | Performed by: STUDENT IN AN ORGANIZED HEALTH CARE EDUCATION/TRAINING PROGRAM

## 2022-05-23 PROCEDURE — 99233 SBSQ HOSP IP/OBS HIGH 50: CPT | Performed by: INTERNAL MEDICINE

## 2022-05-23 PROCEDURE — 36558 INSERT TUNNELED CV CATH: CPT

## 2022-05-23 PROCEDURE — 99152 MOD SED SAME PHYS/QHP 5/>YRS: CPT | Performed by: RADIOLOGY

## 2022-05-23 PROCEDURE — 84100 ASSAY OF PHOSPHORUS: CPT | Performed by: STUDENT IN AN ORGANIZED HEALTH CARE EDUCATION/TRAINING PROGRAM

## 2022-05-23 PROCEDURE — 85730 THROMBOPLASTIN TIME PARTIAL: CPT | Performed by: STUDENT IN AN ORGANIZED HEALTH CARE EDUCATION/TRAINING PROGRAM

## 2022-05-23 PROCEDURE — 02H633Z INSERTION OF INFUSION DEVICE INTO RIGHT ATRIUM, PERCUTANEOUS APPROACH: ICD-10-PCS | Performed by: INTERNAL MEDICINE

## 2022-05-23 PROCEDURE — 76937 US GUIDE VASCULAR ACCESS: CPT | Performed by: RADIOLOGY

## 2022-05-23 PROCEDURE — 85027 COMPLETE CBC AUTOMATED: CPT | Performed by: STUDENT IN AN ORGANIZED HEALTH CARE EDUCATION/TRAINING PROGRAM

## 2022-05-23 PROCEDURE — C1750 CATH, HEMODIALYSIS,LONG-TERM: HCPCS

## 2022-05-23 PROCEDURE — C1894 INTRO/SHEATH, NON-LASER: HCPCS

## 2022-05-23 PROCEDURE — 83735 ASSAY OF MAGNESIUM: CPT | Performed by: STUDENT IN AN ORGANIZED HEALTH CARE EDUCATION/TRAINING PROGRAM

## 2022-05-23 PROCEDURE — 99152 MOD SED SAME PHYS/QHP 5/>YRS: CPT

## 2022-05-23 PROCEDURE — 80053 COMPREHEN METABOLIC PANEL: CPT | Performed by: STUDENT IN AN ORGANIZED HEALTH CARE EDUCATION/TRAINING PROGRAM

## 2022-05-23 PROCEDURE — 77001 FLUOROGUIDE FOR VEIN DEVICE: CPT

## 2022-05-23 PROCEDURE — 99232 SBSQ HOSP IP/OBS MODERATE 35: CPT | Performed by: STUDENT IN AN ORGANIZED HEALTH CARE EDUCATION/TRAINING PROGRAM

## 2022-05-23 PROCEDURE — 94760 N-INVAS EAR/PLS OXIMETRY 1: CPT

## 2022-05-23 PROCEDURE — 36558 INSERT TUNNELED CV CATH: CPT | Performed by: RADIOLOGY

## 2022-05-23 RX ORDER — HYDRALAZINE HYDROCHLORIDE 10 MG/1
10 TABLET, FILM COATED ORAL EVERY 8 HOURS SCHEDULED
Status: DISCONTINUED | OUTPATIENT
Start: 2022-05-23 | End: 2022-05-23

## 2022-05-23 RX ORDER — LABETALOL HYDROCHLORIDE 5 MG/ML
10 INJECTION, SOLUTION INTRAVENOUS EVERY 6 HOURS
Status: DISCONTINUED | OUTPATIENT
Start: 2022-05-23 | End: 2022-05-23

## 2022-05-23 RX ORDER — HYDRALAZINE HYDROCHLORIDE 20 MG/ML
10 INJECTION INTRAMUSCULAR; INTRAVENOUS EVERY 6 HOURS PRN
Status: DISCONTINUED | OUTPATIENT
Start: 2022-05-23 | End: 2022-06-04 | Stop reason: HOSPADM

## 2022-05-23 RX ORDER — MIDAZOLAM HYDROCHLORIDE 2 MG/2ML
INJECTION, SOLUTION INTRAMUSCULAR; INTRAVENOUS CODE/TRAUMA/SEDATION MEDICATION
Status: COMPLETED | OUTPATIENT
Start: 2022-05-23 | End: 2022-05-23

## 2022-05-23 RX ORDER — DOXAZOSIN MESYLATE 1 MG/1
1 TABLET ORAL
Status: DISCONTINUED | OUTPATIENT
Start: 2022-05-23 | End: 2022-06-04 | Stop reason: HOSPADM

## 2022-05-23 RX ORDER — CEFAZOLIN SODIUM 2 G/50ML
SOLUTION INTRAVENOUS
Status: COMPLETED | OUTPATIENT
Start: 2022-05-23 | End: 2022-05-23

## 2022-05-23 RX ORDER — HYDRALAZINE HYDROCHLORIDE 25 MG/1
25 TABLET, FILM COATED ORAL EVERY 8 HOURS SCHEDULED
Status: DISCONTINUED | OUTPATIENT
Start: 2022-05-23 | End: 2022-05-27

## 2022-05-23 RX ORDER — HYDRALAZINE HYDROCHLORIDE 20 MG/ML
5 INJECTION INTRAMUSCULAR; INTRAVENOUS EVERY 6 HOURS PRN
Status: DISCONTINUED | OUTPATIENT
Start: 2022-05-23 | End: 2022-05-23

## 2022-05-23 RX ORDER — HYDRALAZINE HYDROCHLORIDE 20 MG/ML
5 INJECTION INTRAMUSCULAR; INTRAVENOUS EVERY 6 HOURS SCHEDULED
Status: DISCONTINUED | OUTPATIENT
Start: 2022-05-23 | End: 2022-05-23

## 2022-05-23 RX ORDER — FENTANYL CITRATE 50 UG/ML
INJECTION, SOLUTION INTRAMUSCULAR; INTRAVENOUS CODE/TRAUMA/SEDATION MEDICATION
Status: COMPLETED | OUTPATIENT
Start: 2022-05-23 | End: 2022-05-23

## 2022-05-23 RX ADMIN — FENTANYL CITRATE 100 MCG: 50 INJECTION, SOLUTION INTRAMUSCULAR; INTRAVENOUS at 10:18

## 2022-05-23 RX ADMIN — DOXAZOSIN 1 MG: 1 TABLET ORAL at 21:39

## 2022-05-23 RX ADMIN — AMLODIPINE BESYLATE 10 MG: 5 TABLET ORAL at 08:18

## 2022-05-23 RX ADMIN — DOXYCYCLINE 100 MG: 100 CAPSULE ORAL at 08:17

## 2022-05-23 RX ADMIN — LABETALOL 20 MG/4 ML (5 MG/ML) INTRAVENOUS SYRINGE 10 MG: at 11:58

## 2022-05-23 RX ADMIN — HYDRALAZINE HYDROCHLORIDE 25 MG: 25 TABLET ORAL at 21:39

## 2022-05-23 RX ADMIN — MIDAZOLAM 2 MG: 1 INJECTION INTRAMUSCULAR; INTRAVENOUS at 10:18

## 2022-05-23 RX ADMIN — DOCUSATE SODIUM 100 MG: 100 CAPSULE, LIQUID FILLED ORAL at 08:18

## 2022-05-23 RX ADMIN — CEFAZOLIN SODIUM 2000 MG: 2 SOLUTION INTRAVENOUS at 10:02

## 2022-05-23 RX ADMIN — HYDRALAZINE HYDROCHLORIDE 5 MG: 20 INJECTION, SOLUTION INTRAMUSCULAR; INTRAVENOUS at 11:06

## 2022-05-23 RX ADMIN — GABAPENTIN 300 MG: 300 CAPSULE ORAL at 21:39

## 2022-05-23 RX ADMIN — PANTOPRAZOLE SODIUM 40 MG: 40 TABLET, DELAYED RELEASE ORAL at 06:04

## 2022-05-23 RX ADMIN — HEPARIN SODIUM 13 UNITS/KG/HR: 10000 INJECTION, SOLUTION INTRAVENOUS at 11:54

## 2022-05-23 RX ADMIN — LIDOCAINE 5% 2 PATCH: 700 PATCH TOPICAL at 08:20

## 2022-05-23 RX ADMIN — Medication 20 ML: at 10:18

## 2022-05-23 RX ADMIN — PANTOPRAZOLE SODIUM 40 MG: 40 TABLET, DELAYED RELEASE ORAL at 16:58

## 2022-05-23 RX ADMIN — DULOXETINE 60 MG: 30 CAPSULE, DELAYED RELEASE ORAL at 08:18

## 2022-05-23 RX ADMIN — PRAVASTATIN SODIUM 40 MG: 40 TABLET ORAL at 16:58

## 2022-05-23 RX ADMIN — TAMSULOSIN HYDROCHLORIDE 0.4 MG: 0.4 CAPSULE ORAL at 16:58

## 2022-05-23 RX ADMIN — ACETAMINOPHEN 650 MG: 325 TABLET, FILM COATED ORAL at 14:42

## 2022-05-23 RX ADMIN — DOXYCYCLINE 100 MG: 100 CAPSULE ORAL at 21:39

## 2022-05-23 RX ADMIN — HYDRALAZINE HYDROCHLORIDE 25 MG: 25 TABLET ORAL at 14:42

## 2022-05-23 RX ADMIN — GABAPENTIN 100 MG: 100 CAPSULE ORAL at 08:17

## 2022-05-23 RX ADMIN — METOPROLOL SUCCINATE 100 MG: 50 TABLET, EXTENDED RELEASE ORAL at 08:16

## 2022-05-23 NOTE — ASSESSMENT & PLAN NOTE
Patient has PAF  He denies palpitations    Patient maintained sinus rhythm    Continue Toprol    INR subtherapeutic 1 4  Continue holding warfarin   Continue IV heparin infusion/bridge until Thursday morning

## 2022-05-23 NOTE — ASSESSMENT & PLAN NOTE
Wt Readings from Last 3 Encounters:   05/23/22 102 kg (225 lb 8 5 oz)   05/03/22 114 kg (251 lb)   04/29/22 113 kg (250 lb 1 6 oz)     · Prior echo 04/25/2022:  EF 30%, Diastolic function: grade 2 pseudonormal relaxation  · Monitor I/O and daily weights  · Does not take outpatient diuretics  IV Lasix with minimal results during prior admission    Patient had received albumin to help with anasarca  · Fluid restriction, 2 g sodium

## 2022-05-23 NOTE — ASSESSMENT & PLAN NOTE
· Home regimen:  Amlodipine 5 mg daily and metoprolol succinate 100 mg daily  · Nephrology added hydralazine 25 mg q 8 hours

## 2022-05-23 NOTE — PROGRESS NOTES
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, and incomplete recovery from previous JANEL in April 2022     -baseline creatinine previously 0 6-0 9   -presented with creatinine of 5 04   -creatinine currently 5 26  Peaked at 5 49 on 05/22/2022   -follows with Dr Helen Monroy, however requesting to be seen in [facility]   -renal ultrasound negative for hydro   -please see previous note from 05/20/2022 to review negative serological workup   -planning on renal biopsy  Initially was on hold due to anticoagulation  Harry Weiss biopsy form on chart    -no urgent indication to start on dialysis today   -strict I/O, good urine output  Access: tunneled dialysis catheter insertion today  Nephrotic range proteinuria:  -most recent urine protein to creatinine ratio 12 4 g    -planning on renal biopsy once BP is improved, hopefully tomorrow  Discussed positioning with IR physician  BPH:  Maintained on tamsulosin  Gross hematuria: (resolved)  -Coumadin remains on hold  -will continue to monitor and trend hemoglobin   -continue to follow with Urology  Serologies negative  Hyperphosphatemia:  Most recent phosphorus level 5 2   -will continue to monitor, if trending upward, would recommend starting on phosphorus binders  Hypertension: overall acceptable  Periods above goal   -currently on amlodipine 10 mg daily, doxazosin 1 mg at bedtime, metoprolol 100 mg daily  -will add hydralazine 10 mg TID and uptitrate as needed for SBP in the 140s      -avoid hypotension or high fluctuations in blood pressure   -would avoid aggressive management of blood pressure due to JANEL  -recommend holding parameters antihypertensives for systolic blood pressure less than 130  Chronic diastolic heart failure:   With EF of 55% and grade 2 pseudo normal relaxation   -not on outpatient diuretics  -received IV albumin to assist with his anasarca  -will continue to monitor volume status with daily weights, fluid restriction, strict I/O  Factor 5 Leiden mutation:  With history of left lower extremity DVT  -anticoagulated with Coumadin, currently on hold and on heparin drip due to biopsy  Surgical site infection:  With previous cervical fusion on 85/96/6170 complicated by MRSA infection  Currently being treated with doxycycline  Anemia:  -hemoglobin currently 7 4   -continue to monitor and transfuse as needed for hemoglobin less than 7 0  Other:  Atrial fibrillation, hyperlipidemia, depression, WILL on CPAP    Disposition:  Requiring additional stay due to medical needs  SUBJECTIVE:  The patient is resting in his bed  His wife is present at the bedside  He denies chest pain or SOB  He is sleepy from anesthesia  He has not had anything to drink today  He is having difficulty with holding his urine  He denies nausea or vomiting       OBJECTIVE:  Current Weight: Weight - Scale: 102 kg (225 lb 8 5 oz)  Vitals:    05/22/22 1503 05/22/22 2139 05/23/22 0505 05/23/22 0738   BP: 153/78 148/75  149/68   Pulse: 62 60  63   Resp: 18   17   Temp: (!) 97 3 °F (36 3 °C)   97 9 °F (36 6 °C)   TempSrc:       SpO2: 98% 97%  98%   Weight:   102 kg (225 lb 8 5 oz)    Height:           Intake/Output Summary (Last 24 hours) at 5/23/2022 3590  Last data filed at 5/23/2022 7263  Gross per 24 hour   Intake 929 6 ml   Output 1925 ml   Net -995 4 ml     General: NAD  Skin: warm, dry, intact, no rash  HEENT: dry mucous membranes, sclera anicteric, normocephalic, atraumatic  Neck: No apparent JVD appreciated, neck brace  Chest: lung sounds clear B/L, on RA   CVS:Regular rate and rhythm, no murmer   Abdomen: Soft, round, non-tender, +BS, obese  Extremities: No B/L LE edema present  Neuro: alert and oriented  Psych: appropriate mood and affect Medications:    Current Facility-Administered Medications:     acetaminophen (TYLENOL) tablet 650 mg, 650 mg, Oral, Q6H PRN, Aniya Zaldivar PA-C, 650 mg at 05/22/22 2139    amLODIPine (NORVASC) tablet 10 mg, 10 mg, Oral, Daily, Shavonne Silverio PA-C, 10 mg at 05/23/22 0818    docusate sodium (COLACE) capsule 100 mg, 100 mg, Oral, Daily, Jonelle Lou PA-C, 100 mg at 05/23/22 0818    doxazosin (CARDURA) tablet 1 mg, 1 mg, Oral, HS, Rosanna Holliday MD, 1 mg at 05/22/22 2135    doxycycline hyclate (VIBRAMYCIN) capsule 100 mg, 100 mg, Oral, Q12H [address], Jonelle Lou PA-C, 100 mg at 05/23/22 0817    DULoxetine (CYMBALTA) delayed release capsule 60 mg, 60 mg, Oral, Daily, Jonelle Lou PA-C, 60 mg at 05/23/22 0818    gabapentin (NEURONTIN) capsule 100 mg, 100 mg, Oral, Daily, Jonelle Lou PA-C, 100 mg at 05/23/22 0817    gabapentin (NEURONTIN) capsule 300 mg, 300 mg, Oral, HS, Jonelle Lou PA-C, 300 mg at 05/22/22 2135    heparin (porcine) 25,000 units in 0 45% NaCl 250 mL infusion (premix), 3-30 Units/kg/hr (Order-Specific), Intravenous, Titrated, Mak Farmer DO, Stopped at 05/23/22 0359    heparin (porcine) injection 4,000 Units, 4,000 Units, Intravenous, Q1H PRN, Mak Farmer DO    heparin (porcine) injection 8,000 Units, 8,000 Units, Intravenous, Q1H PRN, Mak Farmer DO    lidocaine (LIDODERM) 5 % patch 1 patch, 1 patch, Topical, Daily PRN, Annette Flores PA-C, 1 patch at 05/17/22 0032    lidocaine (LIDODERM) 5 % patch 2 patch, 2 patch, Topical, Daily, Mak Farmer DO, 2 patch at 05/23/22 0820    methocarbamol (ROBAXIN) tablet 750 mg, 750 mg, Oral, Q6H PRN, Aniya Zaldivar PA-C, 750 mg at 05/21/22 2159    metoprolol succinate (TOPROL-XL) 24 hr tablet 100 mg, 100 mg, Oral, Daily, Jonelle Lou PA-C, 100 mg at 05/23/22 0816    ondansetron (ZOFRAN) injection 4 mg, 4 mg, Intravenous, Q4H PRN, Annette Flores PA-C, 4 mg at 05/19/22 1634    pantoprazole (PROTONIX) EC tablet 40 mg, 40 mg, Oral, BID AC, Aniya Zaldivar, PA-C, 40 mg at 05/23/22 0604    pravastatin (PRAVACHOL) tablet 40 mg, 40 mg, Oral, Daily With Laverna Dinning, PA-C, 40 mg at 05/22/22 1750    tamsulosin (FLOMAX) capsule 0 4 mg, 0 4 mg, Oral, Daily With Laverna Dinning, PA-C, 0 4 mg at 05/22/22 1750    Laboratory Results:  Results from last 7 days   Lab Units 05/23/22  0453 05/22/22  0608 05/20/22  0434 05/19/22  0519   WBC Thousand/uL 5 97 5 35  --  5 92   HEMOGLOBIN g/dL 7 4* 7 9*  --  8 4*   HEMATOCRIT % 23 8* 24 8*  --  27 3*   PLATELETS Thousands/uL 180 174  --  195   SODIUM mmol/L 139 139 139 140   POTASSIUM mmol/L 3 5 4 2 3 6 3 3*   CHLORIDE mmol/L 104 104 103 103   CO2 mmol/L 24 25 26 27   BUN mg/dL 51* 52* 50* 47*   CREATININE mg/dL 5 26* 5 49* 5 09* 5 11*   CALCIUM mg/dL 8 2* 8 7 8 6 8 9   MAGNESIUM mg/dL 1 5* 1 4*  --   --    PHOSPHORUS mg/dL 5 2* 5 3*  --   --    ALK PHOS U/L 78 88  --   --    ALT U/L 11* 10*  --   --    AST U/L 14 14  --   --

## 2022-05-23 NOTE — PROGRESS NOTES
Outpatient Care Management Note:    Chart reviewed for outreach purposes  Patient remains hospitalized  Care manager will follow up on discharge

## 2022-05-23 NOTE — ASSESSMENT & PLAN NOTE
Patient has history of left lower extremity DVT after which he was diagnosed with factor 5 Leiden mutation  This happened years ago      Holding Coumadin    Resume heparin drip continue until Thursday and discontinue early that morning

## 2022-05-23 NOTE — ASSESSMENT & PLAN NOTE
· Diagnosed during prior admission    · Postinfectious suspected  · PermCath placed today  · Will re-attempt kidney biopsy on Thursday

## 2022-05-23 NOTE — DISCHARGE INSTRUCTIONS
Acute Kidney Injury (JANEL)  You have been diagnosed with Acute Kidney Injury (JANEL)  The following information has been developed to provide you with information about JANEL and treatment  What is Acute Kidney Injury (JANEL)? JANEL occurs when kidney function decreases over a short period of time  This condition causes a buildup of waste products in the blood and can cause fluid to build up causing swelling in the legs and shortness of breath  Sometimes called Acute Kidney Failure or Acute Renal Failure, JANEL is often reversible if it is found and treated quickly  How do you know if you have JANEL? JANEL is diagnosed by assessing kidney function  This is done by obtaining a blood test to measure the blood level of creatinine  Decreased urine output can sometimes also indicate JANEL  Who is at risk for JANEL? JANEL can happen to anyone but usually happens to people who are already sick and may be in the hospital  People are at higher risk for JANEL if they have any of the following:  age 72 years or older  high or low blood pressure  underlying kidney disease (e g , Chronic Kidney Disease (CKD)  peripheral vascular disease (hardening of arteries)  chronic diseases such as liver disease, heart disease and diabetes  a single kidney    What are the symptoms of JANEL? You may or may not have the symptoms to suggest you have kidney injury until the JANEL has progressed  Some of the symptoms are listed below:  Not making enough urine  Increased swelling in legs   Feeling tired  Trouble breathing or shortness of breath  Nausea  New or worsening confusion    What causes JANEL?   The causes are divided into three categories:  Not enough blood flowing to the kidneys (e g , low blood pressure, bleeding, diarrhea, dehydration)  Injury directly to the kidneys (e g , blood clots, severe infections such as sepsis, medicine toxicity, IV contrast dye used for cardiac catheterization or CT scans)  Blockage to the tubes (ureters) that drain the urine from the kidneys (e g , enlarged prostate, kidney stones, blood clots)    What is the treatment for JANEL? The treatment for JNAEL depends on correcting what caused it  Treatment usually involves removing the cause and measures to prevent further injury to the kidneys  This may require the use of intravenous fluids or medications and/or temporary dialysis  Dialysis is a process using a machine that does the job of the kidneys to remove waste and help correct the electrolyte and fluid balance while the kidneys are recovering  If dialysis is needed to treat JANEL, the doctor will assess daily to see if the kidneys are showing signs of recovery  The daily assessments determine how long dialysis needs to continue  Depending on the cause and the extent of damage, an episode of JANEL may resolve in a few days to several weeks to several months  What are the long term effects of having an episode of JANEL? People who have one episode of JANEL are at an increased risk of having another episode of JANEL as well as other health problems such as kidney disease, stroke, and heart disease  In a small number of people who had unrecognized kidney disease, an JANEL episode may result in Chronic Kidney Disease (CKD) which requires lifelong monitoring and treatment  How do you prevent future episodes of JANEL? Make sure to follow up with the kidney doctor after hospital discharge and obtain blood work to reassess and monitor kidney function  If you have diabetes, keep your blood sugar in goal range and keep appointments with your diabetes specialist    If you have high blood pressure, have your blood pressure checked regularly to make sure it is in target range  If you take blood pressure medicine called ACEIs or ARBs (e g , Lisinopril, Enalapril, Diovan, Losartan), your doctor may tell you to skip a dose or two if you have severe dehydration and your blood pressure is running low    Avoid using medicines such as NSAIDs (Nonsteroidal Anti-inflammatory Drugs) and May-2 Inhibitors (a type of NSAID) that may be harmful to kidney function  These may include the medicines listed in the table that follows  Examples of NSAIDS and May-2 Inhibitors   Talk to your doctor or healthcare provider before stopping any medicine ordered for you  Celecoxib (CELEBREX) Ketoprofen (ORUDIS, ORUVAIL)   Diclofenac (VOLTAREN, CATAFLAM) Ketorolac (TORADOL)   Diflunisal (DOLOBID) Meloxicam (MOBIC)   Etodolac (LODINE) Nabumetone (RELAFEN)   Fenoprofen (NALFON) Naproxen (ALEVE, NAPROSYN,    NAPRELAN, ANAPROX)   Flurbiprofen (ANSAID) Oxaprozin (DAYPRO)   Ibuprofen (MOTRIN, ADVIL) Piroxicam (FELDENE)   Indomethacin (INDOCIN) Sulindac (CLINORIL)    Tolmetin ([place])     Is there a special diet for people with JANEL? People with JANEL and/or other kidney disease often have high potassium and phosphorus levels in their blood  To protect the kidneys from further injury and to avoid complications, most doctors recommend following a healthy diet choosing foods low potassium and low phosphorus  Limiting dietary potassium to 2 5 grams/day and phosphorus to 800 milligrams/day is recommended  A dietitian can help you with learning more about this type of diet  The tables on the following page may help you to choose lower potassium and phosphorus foods  The following websites are also good sources of information:  [employer] at  org/nutrition/Kidney-Disease-Stages-1-4   [employer]   https://www niddk nih gov/health-information/kidney-disease/chronic-kidney-disease-ckd/eating-nutrition  https://[facility] org/health/articles/15641-renal-diet-basics  [employer] cy    If you have any questions or concerns about your condition, please contact your doctor or healthcare provider    These tables may help you to choose lower potassium and phosphorus foods    AVOID these higher phosphorus* foods: CHOOSE these lower phosphorus* foods:   Milk, pudding , yogurt made from animals and from many soy varieties Rice milk (unfortified), nondairy creamer   Hard cheeses, ricotta, cottage cheese, fat-free cream cheese Regular and low-fat cream cheese   Ice cream, frozen yogurt Sherbet, frozen fruit pops, sorbet   Soups made with milk, dried peas, beans, lentils or other high phosphorus ingredients Soups made with broth, are water-based, or other lower phosphorus ingredients   Whole grains, including whole-grain breads, crackers, cereal, rice and pasta, corn tortillas Refined grains, including white bread, crackers, cereals, rice and pasta   Quick breads, biscuits, cornbread, muffins, pancakes, waffles, granola, wheat germ Homemade refined (white) dinner rolls, bagels, English muffins, sugar cookies, shortbread cookies, leo food cake   Dried peas (split, black-eyed), beans (black, garbanzo, lima, kidney, navy, gambino), lentils Green peas (canned, frozen), green beans, wax beans   Organ meats, walleye, [place], sardines Lean beef, pork, lamb, poultry, other fish   Nuts and seeds Popcorn   Peanut butter, other nut butters; tofu, veggie or soy burgers Jam, jelly, honey   Chocolate, including chocolate drinks Carob (chocolate-flavored) candy, hard candy,  gumdrops   Hay, pepper-type sodas, flavored campos, bottled teas, beer Lemon-lime soda, ginger ale or root beer, plain water, cream soda, grape soda   *Always read labels and avoid foods with ingredients containing "phos"  AVOID these higher potassium foods: CHOOSE these lower potassium foods:      Milk (fat free, low fat, whole, buttermilk, Soy), yogurt    Regular and low-fat cream cheese      Beans (white, [place]), [place] sprouts, spinach Swiss       chard, broccoli, avocado, artichoke, potatoes, sweet      potatoes, tomatoes/tomato sauce, beet greens      Green beans, alfalfa sprouts, bamboo shoots (canned), cabbage, carrots, cauliflower, corn, cucumber, eggplant,      endive, lettuce, mushrooms, onions, radishes,  watercress,       water chestnuts (canned), rice, peas      Halibut, tuna, cod, snapper, tuna fish, turkey    Egg, lean beef, pork, lamb, shellfish, chicken       Banana, papaya, orange, cantaloupe, dates, raisins and      other dried fruit, pomegranate, avocado      Apple, applesauce, blackberries, raspberries, pears,     watermelon, cucumbers, blueberries, cranberries,       peaches      Almonds, peanuts, hazelnuts, [place], cashew, mixed,      seeds (sunflower, pumpkin)     Homemade refined (white) dinner rolls, bagels,      English muffins, flour tortilla, crackers, ankita      crackers, popcorn, pretzels, spaghetti or macaroni,       hummus      Tomato or vegetable juice, prune juice    Papaya, james, or pear nectar, cranberry juice cocktail      Molasses

## 2022-05-23 NOTE — ASSESSMENT & PLAN NOTE
· S/p cervical fusion performed by Dr Flynn Rangel on 3/98/95, complicated by MRSA infection  · Completed 24 days of IV vancomycin, transitioned to daptomycin on 04/24-4/29    Discharged home on doxycycline 100 mg b i d    · Continue doxycycline

## 2022-05-23 NOTE — PROGRESS NOTES
[place]  Progress Note - Frandy Hickey 1950, 71 y o  male MRN: [id number]  Unit/Bed#: -Yaya Encounter: [id number]  Primary Care Provider: Corey Singh MD   Date and time admitted to hospital: 5/10/2022  6:51 PM    * JANEL (acute kidney injury) [facility])  Assessment & Plan  Patient admitted with acute kidney injury felt to be most likely due to post infectious  glomerulonephritis  Creatinine still elevated at 5 2   -Unable have kidney biopsy today due to cervical collar not allowing him to lay on his stomach and uncontrolled blood pressure       -Reached out to Dr Rhiannon Rae regarding cervical collar  States that it should not be removed yet  He will decide when to remove in the outpatient setting    -also in regards to the upcoming IR procedure Dr Rhiannon Rae recommends having patient turned prone in collar then have the collar removed for the procedure and then replaced after the procedure    -will attempt kidney biopsy this coming Thursday    Glomerulonephritis  Assessment & Plan  · Diagnosed during prior admission    · Postinfectious suspected  · PermCath placed today  · Will re-attempt kidney biopsy on Thursday    Factor V Leiden mutation [facility])  Assessment & Plan  Patient has history of left lower extremity DVT after which he was diagnosed with factor 5 Leiden mutation  This happened years ago  Holding Coumadin    Resume heparin drip continue until Thursday and discontinue early that morning    Paroxysmal A-fib [facility])  Assessment & Plan  Patient has PAF  He denies palpitations    Patient maintained sinus rhythm    Continue Toprol    INR subtherapeutic 1 4  Continue holding warfarin   Continue IV heparin infusion/bridge until Thursday morning    Gross hematuria  Assessment & Plan  Patient developed gross hematuria during this hospital stay, he has had previously, it is improving as of today 5/20/2022  Continue to hold Coumadin    5/21 denies hematuria  resolved    Chronic diastolic (congestive) heart failure (HCC)  Assessment & Plan  Wt Readings from Last 3 Encounters:   05/23/22 102 kg (225 lb 8 5 oz)   05/03/22 114 kg (251 lb)   04/29/22 113 kg (250 lb 1 6 oz)     · Prior echo 04/25/2022:  EF 32%, Diastolic function: grade 2 pseudonormal relaxation  · Monitor I/O and daily weights  · Does not take outpatient diuretics  IV Lasix with minimal results during prior admission  Patient had received albumin to help with anasarca  · Fluid restriction, 2 g sodium    Surgical site infection  Assessment & Plan  · S/p cervical fusion performed by Dr Jocelyne Melendrez on 3/38/15, complicated by MRSA infection  · Completed 24 days of IV vancomycin, transitioned to daptomycin on 04/24-4/29  Discharged home on doxycycline 100 mg b i d    · Continue doxycycline      Anemia  Assessment & Plan  Patient has chronic anemia  He denies signs of GI  bleeding  Hemoglobin is 7 4 today,    Mixed hyperlipidemia  Assessment & Plan  · Continue statin    Depression, major, single episode, moderate (HCC)  Assessment & Plan  · Continue Cymbalta    WILL (obstructive sleep apnea)  Assessment & Plan  · CPAP HS - patient brought home machine in    Essential hypertension  Assessment & Plan  · Home regimen:  Amlodipine 5 mg daily and metoprolol succinate 100 mg daily  · Nephrology added hydralazine 25 mg q 8 hours    Hypoalbuminemia-resolved as of 5/12/2022  Assessment & Plan  · Albumin 2 1 on admission   · Patient with bilateral lower extremity swelling  Suspect anasarca   · Ordered 1 dose of IV albumin  · Nephrology consulted          VTE Pharmacologic Prophylaxis: VTE Score: 3 Moderate Risk (Score 3-4) - Pharmacological DVT Prophylaxis Ordered: heparin drip  Patient Centered Rounds: I performed bedside rounds with nursing staff today    Discussions with Specialists or Other Care Team Provider: neurosrugery, nephro    Education and Discussions with Family / Patient: Updated contact person (wife) at bedside  Time Spent for Care: 30 minutes  More than 50% of total time spent on counseling and coordination of care as described above  Current Length of Stay: 13 day(s)  Current Patient Status: Inpatient   Certification Statement: The patient will continue to require additional inpatient hospital stay due to kidney bx  Discharge Plan: Anticipate discharge in >72 hrs to discharge location to be determined pending rehab evaluations  Code Status: Level 3 - DNAR and DNI    Subjective:   Patient seen examined at bedside  Currently offers no complaints  Denies hematuria  Objective:     Vitals:   Temp (24hrs), Av 9 °F (36 6 °C), Min:97 9 °F (36 6 °C), Max:97 9 °F (36 6 °C)    Temp:  [97 9 °F (36 6 °C)] 97 9 °F (36 6 °C)  HR:  [54-63] 62  Resp:  [7-17] 17  BP: (133-193)/(64-82) 170/77  SpO2:  [97 %-100 %] 99 %  Body mass index is 31 46 kg/m²  Input and Output Summary (last 24 hours): Intake/Output Summary (Last 24 hours) at 2022 1651  Last data filed at 2022 7250  Gross per 24 hour   Intake 472 ml   Output 1075 ml   Net -603 ml       Physical Exam:   Physical Exam  Vitals reviewed  HENT:      Head: Normocephalic and atraumatic  Right Ear: External ear normal       Left Ear: External ear normal       Nose: Nose normal       Mouth/Throat:      Mouth: Mucous membranes are moist       Pharynx: Oropharynx is clear  Eyes:      Extraocular Movements: Extraocular movements intact  Neck:      Comments: Cervical collar noted  Cardiovascular:      Rate and Rhythm: Normal rate and regular rhythm  Pulses: Normal pulses  Heart sounds: Normal heart sounds  Pulmonary:      Effort: Pulmonary effort is normal       Breath sounds: Normal breath sounds  Abdominal:      General: Abdomen is flat  Palpations: Abdomen is soft  Tenderness: There is no abdominal tenderness  Musculoskeletal:      Cervical back: Normal range of motion     Skin:     General: Skin is warm and dry  Neurological:      General: No focal deficit present  Mental Status: He is alert  Mental status is at baseline     Psychiatric:         Mood and Affect: Mood normal          Behavior: Behavior normal           Additional Data:     Labs:  Results from last 7 days   Lab Units 05/23/22  0453   WBC Thousand/uL 5 97   HEMOGLOBIN g/dL 7 4*   HEMATOCRIT % 23 8*   PLATELETS Thousands/uL 180     Results from last 7 days   Lab Units 05/23/22  0453   SODIUM mmol/L 139   POTASSIUM mmol/L 3 5   CHLORIDE mmol/L 104   CO2 mmol/L 24   BUN mg/dL 51*   CREATININE mg/dL 5 26*   ANION GAP mmol/L 11   CALCIUM mg/dL 8 2*   ALBUMIN g/dL 1 8*   TOTAL BILIRUBIN mg/dL 0 50   ALK PHOS U/L 78   ALT U/L 11*   AST U/L 14   GLUCOSE RANDOM mg/dL 90     Results from last 7 days   Lab Units 05/23/22  0503   INR  1 47*                   Lines/Drains:  Invasive Devices  Report    Peripheral Intravenous Line  Duration           Peripheral IV 05/22/22 Distal;Left;Ventral (anterior) Forearm <1 day          Hemodialysis Catheter  Duration           HD Permanent Double Catheter <1 day                      Imaging: Reviewed radiology reports from this admission including: procedure reports    Recent Cultures (last 7 days):         Last 24 Hours Medication List:   Current Facility-Administered Medications   Medication Dose Route Frequency Provider Last Rate    acetaminophen  650 mg Oral Q6H PRN López Irons, PA-C      amLODIPine  10 mg Oral Daily [place], PA-C      docusate sodium  100 mg Oral Daily López Irons, PA-C      doxazosin  1 mg Oral HS Jennifer Bridge, CRNP      doxycycline hyclate  100 mg Oral Q12H [address] López Irons, PA-C      DULoxetine  60 mg Oral Daily López Irons, PA-C      gabapentin  100 mg Oral Daily López Irons, PA-C      gabapentin  300 mg Oral HS Jonelle Lou PA-C      heparin (porcine)  3-30 Units/kg/hr (Order-Specific) Intravenous Titrated Mak Farmer DO 13 Units/kg/hr (05/23/22 1154)    heparin (porcine) 4,000 Units Intravenous Q1H PRN Mak Tostacey, DO      heparin (porcine)  8,000 Units Intravenous Q1H PRN Mak Toapoorvae, DO      hydrALAZINE  10 mg Intravenous Q6H PRN Deidre Ramon,       hydrALAZINE  25 mg Oral Q8H [address] Sharon Reis, DEEPTHI      lidocaine  1 patch Topical Daily PRN Eleazar Sow PA-C      lidocaine  2 patch Topical Daily Mak Farmer, DO      methocarbamol  750 mg Oral Q6H PRN Major Cherry PA-C      metoprolol succinate  100 mg Oral Daily Major Cherry PA-C      ondansetron  4 mg Intravenous Q4H PRN Annette Flores PA-C      pantoprazole  40 mg Oral BID AC Jonelle Lou PA-C      pravastatin  40 mg Oral Daily With FABIOLA Nesbitt      tamsulosin  0 4 mg Oral Daily With Dinner Major Cherry PA-C          Today, Patient Was Seen By: Jean Paul Olsen DO    **Please Note: This note may have been constructed using a voice recognition system  **

## 2022-05-23 NOTE — SEDATION DOCUMENTATION
Patient arrived to IR suite with cervical in place S/P neck surgery  Dr Jocelyne Melendrez contacted and okay to remove collar for 1 hr for Permacath insertion  Collar immediately placed back on after procedure  Tolerated well and moving all extremities, no c/o numbness or tingling in extremities  Transferred back to room in stable condition, report and care assumed by primary RN

## 2022-05-23 NOTE — BRIEF OP NOTE (RAD/CATH)
INTERVENTIONAL RADIOLOGY PROCEDURE NOTE    Date: 5/23/2022    Procedure: Tunneled dialysis catheter placement    Preoperative diagnosis:   1  JANEL (acute kidney injury) ([facility] [address] )    2  Surgical site infection    3  Paroxysmal A-fib ([facility] [address] )    4  Gross hematuria         Postoperative diagnosis: Same  Surgeon: Neeru Colon MD     Assistant: None  No qualified resident was available  Blood loss: 2 mL    Specimens: None     Findings: Successful right IJV tunneled HD catheter placement  Complications: None immediate      Anesthesia: conscious sedation and local

## 2022-05-23 NOTE — TELEPHONE ENCOUNTER
Received a call from patient's spouse Nancy Benitez regarding patient's status  Nancy Figures stated patient is in the hospital and in need of a renal biopsy however this requires patient to be out of the collar  Patient was scheduled for 6 week visit tomorrow however patient is still in the hospital      As previously discussed with LS RN patient to obtain xray in hospital for Dr Deo Carlos to review since patient will not be able to come to 6 week postop visit  Xray was completed  Advised Nancy Benitez this RN will route a message to Dr Deo Carlos asking him to review the imaging  Nancy Benitez then requested for a phone call from Dr Deo Carlos as her and the patient will not be [facility] [address]  [facility]

## 2022-05-23 NOTE — CASE MANAGEMENT
Case Management Progress Note    Patient name Iva Suarez  Location /-01 MRN [id number]  : 1950 Date 2022       LOS (days): 13  Geometric Mean LOS (GMLOS) (days): 3 10  Days to GMLOS:-9 5        OBJECTIVE:        Current admission status: Inpatient  Preferred Pharmacy:   [address]  [address], [facility] [address]  [address] [place] [address],[address]  [facility] [address]  Phone: [phone] Fax: [phone]    [id number] [place] [facility] [address] [facility]  [address] [facility] [address]  [address] [facility] [address]  Phone: [phone] Fax: [phone]    Primary Care Provider: Mary Jo Osman MD    Primary Insurance: Ana Motta W Lex Magallon REP  Secondary Insurance:     PROGRESS NOTE: CM met with pt to review IMM

## 2022-05-23 NOTE — PLAN OF CARE
Problem: PAIN - ADULT  Goal: Verbalizes/displays adequate comfort level or baseline comfort level  Description: Interventions:  - Encourage patient to monitor pain and request assistance  - Assess pain using appropriate pain scale  - Administer analgesics based on type and severity of pain and evaluate response  - Implement non-pharmacological measures as appropriate and evaluate response  - Consider cultural and social influences on pain and pain management  - Notify physician/advanced practitioner if interventions unsuccessful or patient reports new pain  Outcome: Progressing     Problem: INFECTION - ADULT  Goal: Absence or prevention of progression during hospitalization  Description: INTERVENTIONS:  - Assess and monitor for signs and symptoms of infection  - Monitor lab/diagnostic results  - Monitor all insertion sites, i e  indwelling lines, tubes, and drains  - Monitor endotracheal if appropriate and nasal secretions for changes in amount and color  - [place] appropriate cooling/warming therapies per order  - Administer medications as ordered  - Instruct and encourage patient and family to use good hand hygiene technique  - Identify and instruct in appropriate isolation precautions for identified infection/condition  Outcome: Progressing     Problem: SAFETY ADULT  Goal: Patient will remain free of falls  Description: INTERVENTIONS:  - Educate patient/family on patient safety including physical limitations  - Instruct patient to call for assistance with activity   - Consult OT/PT to assist with strengthening/mobility   - Keep Call bell within reach  - Keep bed low and locked with side rails adjusted as appropriate  - Keep care items and personal belongings within reach  - Initiate and maintain comfort rounds  - Make Fall Risk Sign visible to staff  - Offer Toileting every 2 Hours, in advance of need  - Initiate/Maintain alarm  - Obtain necessary fall risk management equipment:   - Apply yellow socks and bracelet for high fall risk patients  - Consider moving patient to room near nurses station  Outcome: Progressing  Goal: Maintain or return to baseline ADL function  Description: INTERVENTIONS:  -  Assess patient's ability to carry out ADLs; assess patient's baseline for ADL function and identify physical deficits which impact ability to perform ADLs (bathing, care of mouth/teeth, toileting, grooming, dressing, etc )  - Assess/evaluate cause of self-care deficits   - Assess range of motion  - Assess patient's mobility; develop plan if impaired  - Assess patient's need for assistive devices and provide as appropriate  - Encourage maximum independence but intervene and supervise when necessary  - Involve family in performance of ADLs  - Assess for home care needs following discharge   - Consider OT consult to assist with ADL evaluation and planning for discharge  - Provide patient education as appropriate  Outcome: Progressing  Goal: Maintains/Returns to pre admission functional level  Description: INTERVENTIONS:  - Perform BMAT or MOVE assessment daily    - Set and communicate daily mobility goal to care team and patient/family/caregiver     - Collaborate with rehabilitation services on mobility goals if consulted  - Out of bed for toileting  - Record patient progress and toleration of activity level   Outcome: Progressing     Problem: DISCHARGE PLANNING  Goal: Discharge to home or other facility with appropriate resources  Description: INTERVENTIONS:  - Identify barriers to discharge w/patient and caregiver  - Arrange for needed discharge resources and transportation as appropriate  - Identify discharge learning needs (meds, wound care, etc )  - Arrange for interpretive services to assist at discharge as needed  - Refer to Case Management Department for coordinating discharge planning if the patient needs post-hospital services based on physician/advanced practitioner order or complex needs related to functional status, cognitive ability, or social support system  Outcome: Progressing     Problem: Knowledge Deficit  Goal: Patient/family/caregiver demonstrates understanding of disease process, treatment plan, medications, and discharge instructions  Description: Complete learning assessment and assess knowledge base  Interventions:  - Provide teaching at level of understanding  - Provide teaching via preferred learning methods  Outcome: Progressing     Problem: MOBILITY - ADULT  Goal: Maintain or return to baseline ADL function  Description: INTERVENTIONS:  -  Assess patient's ability to carry out ADLs; assess patient's baseline for ADL function and identify physical deficits which impact ability to perform ADLs (bathing, care of mouth/teeth, toileting, grooming, dressing, etc )  - Assess/evaluate cause of self-care deficits   - Assess range of motion  - Assess patient's mobility; develop plan if impaired  - Assess patient's need for assistive devices and provide as appropriate  - Encourage maximum independence but intervene and supervise when necessary  - Involve family in performance of ADLs  - Assess for home care needs following discharge   - Consider OT consult to assist with ADL evaluation and planning for discharge  - Provide patient education as appropriate  Outcome: Progressing  Goal: Maintains/Returns to pre admission functional level  Description: INTERVENTIONS:  - Perform BMAT or MOVE assessment daily    - Set and communicate daily mobility goal to care team and patient/family/caregiver     - Collaborate with rehabilitation services on mobility goals if consulted  - Out of bed for toileting  - Record patient progress and toleration of activity level   Outcome: Progressing     Problem: Prexisting or High Potential for Compromised Skin Integrity  Goal: Skin integrity is maintained or improved  Description: INTERVENTIONS:  - Identify patients at risk for skin breakdown  - Assess and monitor skin integrity  - Assess and monitor nutrition and hydration status  - Monitor labs   - Assess for incontinence   - Turn and reposition patient  - Assist with mobility/ambulation  - Relieve pressure over bony prominences  - Avoid friction and shearing  - Provide appropriate hygiene as needed including keeping skin clean and dry  - Evaluate need for skin moisturizer/barrier cream  - Collaborate with interdisciplinary team   - Patient/family teaching  - Consider wound care consult   Outcome: Progressing     Problem: Potential for Falls  Goal: Patient will remain free of falls  Description: INTERVENTIONS:  - Educate patient/family on patient safety including physical limitations  - Instruct patient to call for assistance with activity   - Consult OT/PT to assist with strengthening/mobility   - Keep Call bell within reach  - Keep bed low and locked with side rails adjusted as appropriate  - Keep care items and personal belongings within reach  - Initiate and maintain comfort rounds  - Make Fall Risk Sign visible to staff  - Offer Toileting every 2 Hours, in advance of need  - Initiate/Maintain alarm  - Obtain necessary fall risk management equipment:   - Apply yellow socks and bracelet for high fall risk patients  - Consider moving patient to room near nurses station  Outcome: Progressing     Problem: Nutrition/Hydration-ADULT  Goal: Nutrient/Hydration intake appropriate for improving, restoring or maintaining nutritional needs  Description: Monitor and assess patient's nutrition/hydration status for malnutrition  Collaborate with interdisciplinary team and initiate plan and interventions as ordered  Monitor patient's weight and dietary intake as ordered or per policy  Utilize nutrition screening tool and intervene as necessary  Determine patient's food preferences and provide high-protein, high-caloric foods as appropriate       INTERVENTIONS:  - Monitor oral intake, urinary output, labs, and treatment plans  - Assess nutrition and hydration status and recommend course of action  - Evaluate amount of meals eaten  - Assist patient with eating if necessary   - Allow adequate time for meals  - Recommend/ encourage appropriate diets, oral nutritional supplements, and vitamin/mineral supplements  - Order, calculate, and assess calorie counts as needed  - Recommend, monitor, and adjust tube feedings and TPN/PPN based on assessed needs  - Assess need for intravenous fluids  - Provide specific nutrition/hydration education as appropriate  - Include patient/family/caregiver in decisions related to nutrition  Outcome: Progressing     Problem: METABOLIC, FLUID AND ELECTROLYTES - ADULT  Goal: Electrolytes maintained within normal limits  Description: INTERVENTIONS:  - Monitor labs and assess patient for signs and symptoms of electrolyte imbalances  - Administer electrolyte replacement as ordered  - Monitor response to electrolyte replacements, including repeat lab results as appropriate  - Instruct patient on fluid and nutrition as appropriate  Outcome: Progressing  Goal: Fluid balance maintained  Description: INTERVENTIONS:  - Monitor labs   - Monitor I/O and WT  - Instruct patient on fluid and nutrition as appropriate  - Assess for signs & symptoms of volume excess or deficit  Outcome: Progressing     Problem: SKIN/TISSUE INTEGRITY - ADULT  Goal: Skin Integrity remains intact(Skin Breakdown Prevention)  Description: Assess:  -Perform Matt assessment every shift  -Clean and moisturize skin every shift  -Inspect skin when repositioning, toileting, and assisting with ADLS  -Assess extremities for adequate circulation and sensation     Bed Management:  -Have minimal linens on bed & keep smooth, unwrinkled  -Change linens as needed when moist or perspiring  -Avoid sitting or lying in one position for more than 2 hours while in bed  -Keep HOB at 30 degrees     Toileting:  -Offer bedside commode  -Assess for incontinence every shift  -Use incontinent care products after each incontinent episode such as chucks    Activity:  -Mobilize patient 3 times a day  -Encourage activity and walks on unit  -Encourage or provide ROM exercises   -Turn and reposition patient every 2 Hours  -Use appropriate equipment to lift or move patient in bed  -Instruct/ Assist with weight shifting every shift when out of bed in chair  -Consider limitation of chair time 2 hour intervals    Skin Care:  -Avoid use of baby powder, tape, friction and shearing, hot water or constrictive clothing  -Relieve pressure over bony prominences using weight shifting  -Do not massage red bony areas    Next Steps:  -Teach patient strategies to minimize risks such as weight shifting   -Consider consults to  interdisciplinary teams such as   Outcome: Progressing  Goal: Incision(s), wounds(s) or drain site(s) healing without S/S of infection  Description: INTERVENTIONS  - Assess and document dressing, incision, wound bed, drain sites and surrounding tissue  - Provide patient and family education  - Perform skin care/dressing changes every shift  Outcome: Progressing     Problem: HEMATOLOGIC - ADULT  Goal: Maintains hematologic stability  Description: INTERVENTIONS  - Assess for signs and symptoms of bleeding or hemorrhage  - Monitor labs  - Administer supportive blood products/factors as ordered and appropriate  Outcome: Progressing     Problem: MUSCULOSKELETAL - ADULT  Goal: Maintain or return mobility to safest level of function  Description: INTERVENTIONS:  - Assess patient's ability to carry out ADLs; assess patient's baseline for ADL function and identify physical deficits which impact ability to perform ADLs (bathing, care of mouth/teeth, toileting, grooming, dressing, etc )  - Assess/evaluate cause of self-care deficits   - Assess range of motion  - Assess patient's mobility  - Assess patient's need for assistive devices and provide as appropriate  - Encourage maximum independence but intervene and supervise when necessary  - Involve family in performance of ADLs  - Assess for home care needs following discharge   - Consider OT consult to assist with ADL evaluation and planning for discharge  - Provide patient education as appropriate  Outcome: Progressing

## 2022-05-23 NOTE — PLAN OF CARE
Problem: PAIN - ADULT  Goal: Verbalizes/displays adequate comfort level or baseline comfort level  Description: Interventions:  - Encourage patient to monitor pain and request assistance  - Assess pain using appropriate pain scale  - Administer analgesics based on type and severity of pain and evaluate response  - Implement non-pharmacological measures as appropriate and evaluate response  - Consider cultural and social influences on pain and pain management  - Notify physician/advanced practitioner if interventions unsuccessful or patient reports new pain  Outcome: Progressing     Problem: INFECTION - ADULT  Goal: Absence or prevention of progression during hospitalization  Description: INTERVENTIONS:  - Assess and monitor for signs and symptoms of infection  - Monitor lab/diagnostic results  - Monitor all insertion sites, i e  indwelling lines, tubes, and drains  - Monitor endotracheal if appropriate and nasal secretions for changes in amount and color  - [place] appropriate cooling/warming therapies per order  - Administer medications as ordered  - Instruct and encourage patient and family to use good hand hygiene technique  - Identify and instruct in appropriate isolation precautions for identified infection/condition  Outcome: Progressing     Problem: SAFETY ADULT  Goal: Patient will remain free of falls  Description: INTERVENTIONS:  - Educate patient/family on patient safety including physical limitations  - Instruct patient to call for assistance with activity   - Consult OT/PT to assist with strengthening/mobility   - Keep Call bell within reach  - Keep bed low and locked with side rails adjusted as appropriate  - Keep care items and personal belongings within reach  - Initiate and maintain comfort rounds  - Make Fall Risk Sign visible to staff  - Offer Toileting every 2 Hours, in advance of need  - Initiate/Maintain alarm  - Obtain necessary fall risk management equipment:   - Apply yellow socks and bracelet for high fall risk patients  - Consider moving patient to room near nurses station  Outcome: Progressing  Goal: Maintain or return to baseline ADL function  Description: INTERVENTIONS:  -  Assess patient's ability to carry out ADLs; assess patient's baseline for ADL function and identify physical deficits which impact ability to perform ADLs (bathing, care of mouth/teeth, toileting, grooming, dressing, etc )  - Assess/evaluate cause of self-care deficits   - Assess range of motion  - Assess patient's mobility; develop plan if impaired  - Assess patient's need for assistive devices and provide as appropriate  - Encourage maximum independence but intervene and supervise when necessary  - Involve family in performance of ADLs  - Assess for home care needs following discharge   - Consider OT consult to assist with ADL evaluation and planning for discharge  - Provide patient education as appropriate  Outcome: Progressing  Goal: Maintains/Returns to pre admission functional level  Description: INTERVENTIONS:  - Perform BMAT or MOVE assessment daily    - Set and communicate daily mobility goal to care team and patient/family/caregiver     - Collaborate with rehabilitation services on mobility goals if consulted  - Out of bed for toileting  - Record patient progress and toleration of activity level   Outcome: Progressing     Problem: DISCHARGE PLANNING  Goal: Discharge to home or other facility with appropriate resources  Description: INTERVENTIONS:  - Identify barriers to discharge w/patient and caregiver  - Arrange for needed discharge resources and transportation as appropriate  - Identify discharge learning needs (meds, wound care, etc )  - Arrange for interpretive services to assist at discharge as needed  - Refer to Case Management Department for coordinating discharge planning if the patient needs post-hospital services based on physician/advanced practitioner order or complex needs related to functional status, cognitive ability, or social support system  Outcome: Progressing     Problem: Knowledge Deficit  Goal: Patient/family/caregiver demonstrates understanding of disease process, treatment plan, medications, and discharge instructions  Description: Complete learning assessment and assess knowledge base  Interventions:  - Provide teaching at level of understanding  - Provide teaching via preferred learning methods  Outcome: Progressing     Problem: MOBILITY - ADULT  Goal: Maintain or return to baseline ADL function  Description: INTERVENTIONS:  -  Assess patient's ability to carry out ADLs; assess patient's baseline for ADL function and identify physical deficits which impact ability to perform ADLs (bathing, care of mouth/teeth, toileting, grooming, dressing, etc )  - Assess/evaluate cause of self-care deficits   - Assess range of motion  - Assess patient's mobility; develop plan if impaired  - Assess patient's need for assistive devices and provide as appropriate  - Encourage maximum independence but intervene and supervise when necessary  - Involve family in performance of ADLs  - Assess for home care needs following discharge   - Consider OT consult to assist with ADL evaluation and planning for discharge  - Provide patient education as appropriate  Outcome: Progressing  Goal: Maintains/Returns to pre admission functional level  Description: INTERVENTIONS:  - Perform BMAT or MOVE assessment daily    - Set and communicate daily mobility goal to care team and patient/family/caregiver     - Collaborate with rehabilitation services on mobility goals if consulted  - Out of bed for toileting  - Record patient progress and toleration of activity level   Outcome: Progressing     Problem: Prexisting or High Potential for Compromised Skin Integrity  Goal: Skin integrity is maintained or improved  Description: INTERVENTIONS:  - Identify patients at risk for skin breakdown  - Assess and monitor skin integrity  - Assess and monitor nutrition and hydration status  - Monitor labs   - Assess for incontinence   - Turn and reposition patient  - Assist with mobility/ambulation  - Relieve pressure over bony prominences  - Avoid friction and shearing  - Provide appropriate hygiene as needed including keeping skin clean and dry  - Evaluate need for skin moisturizer/barrier cream  - Collaborate with interdisciplinary team   - Patient/family teaching  - Consider wound care consult   Outcome: Progressing     Problem: Potential for Falls  Goal: Patient will remain free of falls  Description: INTERVENTIONS:  - Educate patient/family on patient safety including physical limitations  - Instruct patient to call for assistance with activity   - Consult OT/PT to assist with strengthening/mobility   - Keep Call bell within reach  - Keep bed low and locked with side rails adjusted as appropriate  - Keep care items and personal belongings within reach  - Initiate and maintain comfort rounds  - Make Fall Risk Sign visible to staff  - Offer Toileting every 2 Hours, in advance of need  - Initiate/Maintain alarm  - Obtain necessary fall risk management equipment:   - Apply yellow socks and bracelet for high fall risk patients  - Consider moving patient to room near nurses station  Outcome: Progressing     Problem: Nutrition/Hydration-ADULT  Goal: Nutrient/Hydration intake appropriate for improving, restoring or maintaining nutritional needs  Description: Monitor and assess patient's nutrition/hydration status for malnutrition  Collaborate with interdisciplinary team and initiate plan and interventions as ordered  Monitor patient's weight and dietary intake as ordered or per policy  Utilize nutrition screening tool and intervene as necessary  Determine patient's food preferences and provide high-protein, high-caloric foods as appropriate       INTERVENTIONS:  - Monitor oral intake, urinary output, labs, and treatment plans  - Assess nutrition and hydration status and recommend course of action  - Evaluate amount of meals eaten  - Assist patient with eating if necessary   - Allow adequate time for meals  - Recommend/ encourage appropriate diets, oral nutritional supplements, and vitamin/mineral supplements  - Order, calculate, and assess calorie counts as needed  - Recommend, monitor, and adjust tube feedings and TPN/PPN based on assessed needs  - Assess need for intravenous fluids  - Provide specific nutrition/hydration education as appropriate  - Include patient/family/caregiver in decisions related to nutrition  Outcome: Progressing     Problem: METABOLIC, FLUID AND ELECTROLYTES - ADULT  Goal: Electrolytes maintained within normal limits  Description: INTERVENTIONS:  - Monitor labs and assess patient for signs and symptoms of electrolyte imbalances  - Administer electrolyte replacement as ordered  - Monitor response to electrolyte replacements, including repeat lab results as appropriate  - Instruct patient on fluid and nutrition as appropriate  Outcome: Progressing  Goal: Fluid balance maintained  Description: INTERVENTIONS:  - Monitor labs   - Monitor I/O and WT  - Instruct patient on fluid and nutrition as appropriate  - Assess for signs & symptoms of volume excess or deficit  Outcome: Progressing     Problem: HEMATOLOGIC - ADULT  Goal: Maintains hematologic stability  Description: INTERVENTIONS  - Assess for signs and symptoms of bleeding or hemorrhage  - Monitor labs  - Administer supportive blood products/factors as ordered and appropriate  Outcome: Progressing     Problem: MUSCULOSKELETAL - ADULT  Goal: Maintain or return mobility to safest level of function  Description: INTERVENTIONS:  - Assess patient's ability to carry out ADLs; assess patient's baseline for ADL function and identify physical deficits which impact ability to perform ADLs (bathing, care of mouth/teeth, toileting, grooming, dressing, etc )  - Assess/evaluate cause of self-care deficits   - Assess range of motion  - Assess patient's mobility  - Assess patient's need for assistive devices and provide as appropriate  - Encourage maximum independence but intervene and supervise when necessary  - Involve family in performance of ADLs  - Assess for home care needs following discharge   - Consider OT consult to assist with ADL evaluation and planning for discharge  - Provide patient education as appropriate  Outcome: Progressing

## 2022-05-23 NOTE — ASSESSMENT & PLAN NOTE
Patient admitted with acute kidney injury felt to be most likely due to post infectious  glomerulonephritis  Creatinine still elevated at 5 2   -Unable have kidney biopsy today due to cervical collar not allowing him to lay on his stomach and uncontrolled blood pressure       -Reached out to Dr Fabrice Drummond regarding cervical collar  States that it should not be removed yet    He will decide when to remove in the outpatient setting    -also in regards to the upcoming IR procedure Dr Fabrice Drummond recommends having patient turned prone in collar then have the collar removed for the procedure and then replaced after the procedure    -will attempt kidney biopsy this coming Thursday

## 2022-05-24 PROBLEM — R31.0 GROSS HEMATURIA: Status: RESOLVED | Noted: 2022-05-17 | Resolved: 2022-05-24

## 2022-05-24 LAB
ALBUMIN SERPL BCP-MCNC: 2 G/DL (ref 3.5–5)
ALP SERPL-CCNC: 85 U/L (ref 46–116)
ALT SERPL W P-5'-P-CCNC: 9 U/L (ref 12–78)
ANION GAP SERPL CALCULATED.3IONS-SCNC: 9 MMOL/L (ref 4–13)
APTT PPP: 113 SECONDS (ref 23–37)
APTT PPP: 47 SECONDS (ref 23–37)
APTT PPP: 58 SECONDS (ref 23–37)
AST SERPL W P-5'-P-CCNC: 13 U/L (ref 5–45)
BILIRUB SERPL-MCNC: 0.4 MG/DL (ref 0.2–1)
BUN SERPL-MCNC: 50 MG/DL (ref 5–25)
CALCIUM ALBUM COR SERPL-MCNC: 10 MG/DL (ref 8.3–10.1)
CALCIUM SERPL-MCNC: 8.4 MG/DL (ref 8.3–10.1)
CHLORIDE SERPL-SCNC: 103 MMOL/L (ref 100–108)
CO2 SERPL-SCNC: 27 MMOL/L (ref 21–32)
CREAT 24H UR-MRATE: 0.9 G/24HR (ref 0.8–1.8)
CREAT SERPL-MCNC: 5.46 MG/DL (ref 0.6–1.3)
ERYTHROCYTE [DISTWIDTH] IN BLOOD BY AUTOMATED COUNT: 14.1 % (ref 11.6–15.1)
GFR SERPL CREATININE-BSD FRML MDRD: 9 ML/MIN/1.73SQ M
GLUCOSE SERPL-MCNC: 103 MG/DL (ref 65–140)
HCT VFR BLD AUTO: 23.4 % (ref 36.5–49.3)
HGB BLD-MCNC: 7.4 G/DL (ref 12–17)
INR PPP: 1.32 (ref 0.84–1.19)
MAGNESIUM SERPL-MCNC: 1.5 MG/DL (ref 1.6–2.6)
MCH RBC QN AUTO: 28.6 PG (ref 26.8–34.3)
MCHC RBC AUTO-ENTMCNC: 31.6 G/DL (ref 31.4–37.4)
MCV RBC AUTO: 90 FL (ref 82–98)
PHOSPHATE SERPL-MCNC: 6 MG/DL (ref 2.3–4.1)
PLATELET # BLD AUTO: 173 THOUSANDS/UL (ref 149–390)
PMV BLD AUTO: 10.6 FL (ref 8.9–12.7)
POTASSIUM SERPL-SCNC: 3.5 MMOL/L (ref 3.5–5.3)
PROT SERPL-MCNC: 5.8 G/DL (ref 6.4–8.2)
PROTHROMBIN TIME: 16.2 SECONDS (ref 11.6–14.5)
RBC # BLD AUTO: 2.59 MILLION/UL (ref 3.88–5.62)
SODIUM SERPL-SCNC: 139 MMOL/L (ref 136–145)
SPECIMEN VOL UR: 2000 ML
WBC # BLD AUTO: 5.82 THOUSAND/UL (ref 4.31–10.16)

## 2022-05-24 PROCEDURE — 84100 ASSAY OF PHOSPHORUS: CPT | Performed by: STUDENT IN AN ORGANIZED HEALTH CARE EDUCATION/TRAINING PROGRAM

## 2022-05-24 PROCEDURE — 94760 N-INVAS EAR/PLS OXIMETRY 1: CPT

## 2022-05-24 PROCEDURE — 99232 SBSQ HOSP IP/OBS MODERATE 35: CPT | Performed by: PHYSICIAN ASSISTANT

## 2022-05-24 PROCEDURE — 80053 COMPREHEN METABOLIC PANEL: CPT | Performed by: STUDENT IN AN ORGANIZED HEALTH CARE EDUCATION/TRAINING PROGRAM

## 2022-05-24 PROCEDURE — 85730 THROMBOPLASTIN TIME PARTIAL: CPT | Performed by: STUDENT IN AN ORGANIZED HEALTH CARE EDUCATION/TRAINING PROGRAM

## 2022-05-24 PROCEDURE — 99232 SBSQ HOSP IP/OBS MODERATE 35: CPT | Performed by: INTERNAL MEDICINE

## 2022-05-24 PROCEDURE — 83735 ASSAY OF MAGNESIUM: CPT | Performed by: STUDENT IN AN ORGANIZED HEALTH CARE EDUCATION/TRAINING PROGRAM

## 2022-05-24 PROCEDURE — 85027 COMPLETE CBC AUTOMATED: CPT | Performed by: STUDENT IN AN ORGANIZED HEALTH CARE EDUCATION/TRAINING PROGRAM

## 2022-05-24 PROCEDURE — 85610 PROTHROMBIN TIME: CPT | Performed by: STUDENT IN AN ORGANIZED HEALTH CARE EDUCATION/TRAINING PROGRAM

## 2022-05-24 PROCEDURE — 82570 ASSAY OF URINE CREATININE: CPT | Performed by: INTERNAL MEDICINE

## 2022-05-24 PROCEDURE — 84156 ASSAY OF PROTEIN URINE: CPT | Performed by: INTERNAL MEDICINE

## 2022-05-24 PROCEDURE — 85730 THROMBOPLASTIN TIME PARTIAL: CPT | Performed by: HOSPITALIST

## 2022-05-24 RX ORDER — CALCIUM ACETATE 667 MG/1
667 CAPSULE ORAL
Status: DISCONTINUED | OUTPATIENT
Start: 2022-05-24 | End: 2022-05-27

## 2022-05-24 RX ORDER — SENNOSIDES 8.6 MG
1 TABLET ORAL
Status: DISCONTINUED | OUTPATIENT
Start: 2022-05-24 | End: 2022-06-01

## 2022-05-24 RX ADMIN — METHOCARBAMOL TABLETS 750 MG: 500 TABLET, COATED ORAL at 08:54

## 2022-05-24 RX ADMIN — GABAPENTIN 300 MG: 300 CAPSULE ORAL at 21:21

## 2022-05-24 RX ADMIN — DOCUSATE SODIUM 100 MG: 100 CAPSULE, LIQUID FILLED ORAL at 08:50

## 2022-05-24 RX ADMIN — METOPROLOL SUCCINATE 100 MG: 50 TABLET, EXTENDED RELEASE ORAL at 08:50

## 2022-05-24 RX ADMIN — DOXYCYCLINE 100 MG: 100 CAPSULE ORAL at 21:21

## 2022-05-24 RX ADMIN — DOXAZOSIN 1 MG: 1 TABLET ORAL at 21:21

## 2022-05-24 RX ADMIN — PANTOPRAZOLE SODIUM 40 MG: 40 TABLET, DELAYED RELEASE ORAL at 15:47

## 2022-05-24 RX ADMIN — HYDRALAZINE HYDROCHLORIDE 25 MG: 25 TABLET ORAL at 21:21

## 2022-05-24 RX ADMIN — ACETAMINOPHEN 650 MG: 325 TABLET, FILM COATED ORAL at 21:29

## 2022-05-24 RX ADMIN — ACETAMINOPHEN 650 MG: 325 TABLET, FILM COATED ORAL at 08:54

## 2022-05-24 RX ADMIN — HYDRALAZINE HYDROCHLORIDE 25 MG: 25 TABLET ORAL at 13:35

## 2022-05-24 RX ADMIN — GABAPENTIN 100 MG: 100 CAPSULE ORAL at 08:50

## 2022-05-24 RX ADMIN — CALCIUM ACETATE 667 MG: 667 CAPSULE ORAL at 15:47

## 2022-05-24 RX ADMIN — SENNOSIDES 8.6 MG: 8.6 TABLET, FILM COATED ORAL at 05:02

## 2022-05-24 RX ADMIN — PRAVASTATIN SODIUM 40 MG: 40 TABLET ORAL at 15:47

## 2022-05-24 RX ADMIN — DOXYCYCLINE 100 MG: 100 CAPSULE ORAL at 08:49

## 2022-05-24 RX ADMIN — HYDRALAZINE HYDROCHLORIDE 25 MG: 25 TABLET ORAL at 05:02

## 2022-05-24 RX ADMIN — AMLODIPINE BESYLATE 10 MG: 5 TABLET ORAL at 08:50

## 2022-05-24 RX ADMIN — HEPARIN SODIUM 4000 UNITS: 1000 INJECTION INTRAVENOUS; SUBCUTANEOUS at 15:44

## 2022-05-24 RX ADMIN — DULOXETINE 60 MG: 30 CAPSULE, DELAYED RELEASE ORAL at 08:49

## 2022-05-24 RX ADMIN — TAMSULOSIN HYDROCHLORIDE 0.4 MG: 0.4 CAPSULE ORAL at 15:47

## 2022-05-24 RX ADMIN — CALCIUM ACETATE 667 MG: 667 CAPSULE ORAL at 10:36

## 2022-05-24 RX ADMIN — HEPARIN SODIUM 8 UNITS/KG/HR: 10000 INJECTION, SOLUTION INTRAVENOUS at 07:27

## 2022-05-24 RX ADMIN — ONDANSETRON 4 MG: 2 INJECTION INTRAMUSCULAR; INTRAVENOUS at 22:00

## 2022-05-24 RX ADMIN — PANTOPRAZOLE SODIUM 40 MG: 40 TABLET, DELAYED RELEASE ORAL at 06:15

## 2022-05-24 RX ADMIN — CALCIUM ACETATE 667 MG: 667 CAPSULE ORAL at 13:35

## 2022-05-24 RX ADMIN — HEPARIN SODIUM 4000 UNITS: 1000 INJECTION INTRAVENOUS; SUBCUTANEOUS at 23:00

## 2022-05-24 NOTE — TELEPHONE ENCOUNTER
Contacted both Nancy Benitez and Kiana Varela to advise of message received from [address]  Advised that he is to remain in his collar for another 2 weeks and have xray completed prior  Assisted to schedule virtual visit to discuss the repeat xray  They were appreciative

## 2022-05-24 NOTE — PLAN OF CARE
Problem: PAIN - ADULT  Goal: Verbalizes/displays adequate comfort level or baseline comfort level  Description: Interventions:  - Encourage patient to monitor pain and request assistance  - Assess pain using appropriate pain scale  - Administer analgesics based on type and severity of pain and evaluate response  - Implement non-pharmacological measures as appropriate and evaluate response  - Consider cultural and social influences on pain and pain management  - Notify physician/advanced practitioner if interventions unsuccessful or patient reports new pain  Outcome: Progressing     Problem: INFECTION - ADULT  Goal: Absence or prevention of progression during hospitalization  Description: INTERVENTIONS:  - Assess and monitor for signs and symptoms of infection  - Monitor lab/diagnostic results  - Monitor all insertion sites, i e  indwelling lines, tubes, and drains  - Monitor endotracheal if appropriate and nasal secretions for changes in amount and color  - [place] appropriate cooling/warming therapies per order  - Administer medications as ordered  - Instruct and encourage patient and family to use good hand hygiene technique  - Identify and instruct in appropriate isolation precautions for identified infection/condition  Outcome: Progressing     Problem: SAFETY ADULT  Goal: Patient will remain free of falls  Description: INTERVENTIONS:  - Educate patient/family on patient safety including physical limitations  - Instruct patient to call for assistance with activity   - Consult OT/PT to assist with strengthening/mobility   - Keep Call bell within reach  - Keep bed low and locked with side rails adjusted as appropriate  - Keep care items and personal belongings within reach  - Initiate and maintain comfort rounds  - Make Fall Risk Sign visible to staff  - Offer Toileting every 2 Hours, in advance of need  - Initiate/Maintain alarm  - Obtain necessary fall risk management equipment:   - Apply yellow socks and bracelet for high fall risk patients  - Consider moving patient to room near nurses station  Outcome: Progressing  Goal: Maintain or return to baseline ADL function  Description: INTERVENTIONS:  -  Assess patient's ability to carry out ADLs; assess patient's baseline for ADL function and identify physical deficits which impact ability to perform ADLs (bathing, care of mouth/teeth, toileting, grooming, dressing, etc )  - Assess/evaluate cause of self-care deficits   - Assess range of motion  - Assess patient's mobility; develop plan if impaired  - Assess patient's need for assistive devices and provide as appropriate  - Encourage maximum independence but intervene and supervise when necessary  - Involve family in performance of ADLs  - Assess for home care needs following discharge   - Consider OT consult to assist with ADL evaluation and planning for discharge  - Provide patient education as appropriate  Outcome: Progressing  Goal: Maintains/Returns to pre admission functional level  Description: INTERVENTIONS:  - Perform BMAT or MOVE assessment daily    - Set and communicate daily mobility goal to care team and patient/family/caregiver     - Collaborate with rehabilitation services on mobility goals if consulted  - Out of bed for toileting  - Record patient progress and toleration of activity level   Outcome: Progressing     Problem: DISCHARGE PLANNING  Goal: Discharge to home or other facility with appropriate resources  Description: INTERVENTIONS:  - Identify barriers to discharge w/patient and caregiver  - Arrange for needed discharge resources and transportation as appropriate  - Identify discharge learning needs (meds, wound care, etc )  - Arrange for interpretive services to assist at discharge as needed  - Refer to Case Management Department for coordinating discharge planning if the patient needs post-hospital services based on physician/advanced practitioner order or complex needs related to functional status, cognitive ability, or social support system  Outcome: Progressing     Problem: Knowledge Deficit  Goal: Patient/family/caregiver demonstrates understanding of disease process, treatment plan, medications, and discharge instructions  Description: Complete learning assessment and assess knowledge base  Interventions:  - Provide teaching at level of understanding  - Provide teaching via preferred learning methods  Outcome: Progressing     Problem: MOBILITY - ADULT  Goal: Maintain or return to baseline ADL function  Description: INTERVENTIONS:  -  Assess patient's ability to carry out ADLs; assess patient's baseline for ADL function and identify physical deficits which impact ability to perform ADLs (bathing, care of mouth/teeth, toileting, grooming, dressing, etc )  - Assess/evaluate cause of self-care deficits   - Assess range of motion  - Assess patient's mobility; develop plan if impaired  - Assess patient's need for assistive devices and provide as appropriate  - Encourage maximum independence but intervene and supervise when necessary  - Involve family in performance of ADLs  - Assess for home care needs following discharge   - Consider OT consult to assist with ADL evaluation and planning for discharge  - Provide patient education as appropriate  Outcome: Progressing  Goal: Maintains/Returns to pre admission functional level  Description: INTERVENTIONS:  - Perform BMAT or MOVE assessment daily    - Set and communicate daily mobility goal to care team and patient/family/caregiver     - Collaborate with rehabilitation services on mobility goals if consulted  - Out of bed for toileting  - Record patient progress and toleration of activity level   Outcome: Progressing     Problem: Prexisting or High Potential for Compromised Skin Integrity  Goal: Skin integrity is maintained or improved  Description: INTERVENTIONS:  - Identify patients at risk for skin breakdown  - Assess and monitor skin integrity  - Assess and monitor nutrition and hydration status  - Monitor labs   - Assess for incontinence   - Turn and reposition patient  - Assist with mobility/ambulation  - Relieve pressure over bony prominences  - Avoid friction and shearing  - Provide appropriate hygiene as needed including keeping skin clean and dry  - Evaluate need for skin moisturizer/barrier cream  - Collaborate with interdisciplinary team   - Patient/family teaching  - Consider wound care consult   Outcome: Progressing     Problem: Potential for Falls  Goal: Patient will remain free of falls  Description: INTERVENTIONS:  - Educate patient/family on patient safety including physical limitations  - Instruct patient to call for assistance with activity   - Consult OT/PT to assist with strengthening/mobility   - Keep Call bell within reach  - Keep bed low and locked with side rails adjusted as appropriate  - Keep care items and personal belongings within reach  - Initiate and maintain comfort rounds  - Make Fall Risk Sign visible to staff  - Offer Toileting every 2 Hours, in advance of need  - Initiate/Maintain alarm  - Obtain necessary fall risk management equipment:   - Apply yellow socks and bracelet for high fall risk patients  - Consider moving patient to room near nurses station  Outcome: Progressing     Problem: Nutrition/Hydration-ADULT  Goal: Nutrient/Hydration intake appropriate for improving, restoring or maintaining nutritional needs  Description: Monitor and assess patient's nutrition/hydration status for malnutrition  Collaborate with interdisciplinary team and initiate plan and interventions as ordered  Monitor patient's weight and dietary intake as ordered or per policy  Utilize nutrition screening tool and intervene as necessary  Determine patient's food preferences and provide high-protein, high-caloric foods as appropriate       INTERVENTIONS:  - Monitor oral intake, urinary output, labs, and treatment plans  - Assess nutrition and hydration status and recommend course of action  - Evaluate amount of meals eaten  - Assist patient with eating if necessary   - Allow adequate time for meals  - Recommend/ encourage appropriate diets, oral nutritional supplements, and vitamin/mineral supplements  - Order, calculate, and assess calorie counts as needed  - Recommend, monitor, and adjust tube feedings and TPN/PPN based on assessed needs  - Assess need for intravenous fluids  - Provide specific nutrition/hydration education as appropriate  - Include patient/family/caregiver in decisions related to nutrition  Outcome: Progressing     Problem: METABOLIC, FLUID AND ELECTROLYTES - ADULT  Goal: Electrolytes maintained within normal limits  Description: INTERVENTIONS:  - Monitor labs and assess patient for signs and symptoms of electrolyte imbalances  - Administer electrolyte replacement as ordered  - Monitor response to electrolyte replacements, including repeat lab results as appropriate  - Instruct patient on fluid and nutrition as appropriate  Outcome: Progressing  Goal: Fluid balance maintained  Description: INTERVENTIONS:  - Monitor labs   - Monitor I/O and WT  - Instruct patient on fluid and nutrition as appropriate  - Assess for signs & symptoms of volume excess or deficit  Outcome: Progressing     Problem: SKIN/TISSUE INTEGRITY - ADULT  Goal: Skin Integrity remains intact(Skin Breakdown Prevention)  Description: Assess:  -Perform Matt assessment every   -Clean and moisturize skin every   -Inspect skin when repositioning, toileting, and assisting with ADLS  -Assess under medical devices such as  every   -Assess extremities for adequate circulation and sensation     Bed Management:  -Have minimal linens on bed & keep smooth, unwrinkled  -Change linens as needed when moist or perspiring  -Avoid sitting or lying in one position for more than  hours while in bed  -Keep HOB at degrees     Toileting:  -Offer bedside commode  -Assess for incontinence every   -Use incontinent care products after each incontinent episode such as     Activity:  -Mobilize patient  times a day  -Encourage activity and walks on unit  -Encourage or provide ROM exercises   -Turn and reposition patient every  Hours  -Use appropriate equipment to lift or move patient in bed  -Instruct/ Assist with weight shifting every  when out of bed in chair  -Consider limitation of chair time  hour intervals    Skin Care:  -Avoid use of baby powder, tape, friction and shearing, hot water or constrictive clothing  -Relieve pressure over bony prominences using   -Do not massage red bony areas    Next Steps:  -Teach patient strategies to minimize risks such as    -Consider consults to  interdisciplinary teams such as   Outcome: Progressing  Goal: Incision(s), wounds(s) or drain site(s) healing without S/S of infection  Description: INTERVENTIONS  - Assess and document dressing, incision, wound bed, drain sites and surrounding tissue  - Provide patient and family education  - Perform skin care/dressing changes ever  Outcome: Progressing     Problem: HEMATOLOGIC - ADULT  Goal: Maintains hematologic stability  Description: INTERVENTIONS  - Assess for signs and symptoms of bleeding or hemorrhage  - Monitor labs  - Administer supportive blood products/factors as ordered and appropriate  Outcome: Progressing     Problem: MUSCULOSKELETAL - ADULT  Goal: Maintain or return mobility to safest level of function  Description: INTERVENTIONS:  - Assess patient's ability to carry out ADLs; assess patient's baseline for ADL function and identify physical deficits which impact ability to perform ADLs (bathing, care of mouth/teeth, toileting, grooming, dressing, etc )  - Assess/evaluate cause of self-care deficits   - Assess range of motion  - Assess patient's mobility  - Assess patient's need for assistive devices and provide as appropriate  - Encourage maximum independence but intervene and supervise when necessary  - Involve family in performance of ADLs  - Assess for home care needs following discharge   - Consider OT consult to assist with ADL evaluation and planning for discharge  - Provide patient education as appropriate  Outcome: Progressing

## 2022-05-24 NOTE — ASSESSMENT & PLAN NOTE
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

## 2022-05-24 NOTE — PROGRESS NOTES
Pastoral Care Progress Note    2022  Patient: Maria Ines Connolly : 1950  Admission Date & Time: 5/10/2022 1851  MRN: [id number] [facility]: [id number]                     Chaplaincy Interventions Utilized:   Relationship Building: Cultivated a relationship of care and support  Patient said that he is feeling pretty well overall  He strikes me as extraordinarily patient and easy-going seeing as though he has been hospitalized for two weeks and indicated he might be here another week  He said the biggest regret of this and a previous hospitalization is that he is losing quality time to spend with his wife, who has two terminal diseases  He is [place] and said that his belief in God helps him  He asked me to cut our visit short because he wasn't feeling well  I told his nurse that he was feeling nauseous      22 1100   Clinical Encounter Type   Visited With Patient   Routine Visit Introduction   Referral To   (census/rounds)

## 2022-05-24 NOTE — ASSESSMENT & PLAN NOTE
· Patient admitted with acute kidney injury felt to be most likely due to post infectious  glomerulonephritis  · Creatinine still elevated at 5 2  · Was unable have kidney biopsy due to cervical collar not allowing him to lay on his stomach and uncontrolled blood pressure    · Neurosurgery okay with patient being prone as long as he can keep the cervical collar in place  · Nephrology discussed with IR who were agreeable to have patient last on his side for the procedure    · Dr Jose Francisco Wells recommending cervical collar remain in place for at least additional 2 weeks  · Attempt kidney biopsy this coming Thursday

## 2022-05-24 NOTE — ASSESSMENT & PLAN NOTE
· Home regimen:  Amlodipine 5 mg daily and metoprolol succinate 100 mg daily  · Nephrology added hydralazine 25 mg q 8 hours  · Consider changing hydralazine for increased dose of amlodipine after biopsy for patient tolerance

## 2022-05-24 NOTE — ASSESSMENT & PLAN NOTE
· S/p cervical fusion performed by Dr May Narvaez on 1/43/79, complicated by MRSA infection  · Completed 24 days of IV vancomycin, transitioned to daptomycin on 04/24-4/29    Discharged home on doxycycline 100 mg b i d    · Continue doxycycline  · Will need follow up with Neurosurgery

## 2022-05-24 NOTE — PROGRESS NOTES
NEPHROLOGY PROGRESS NOTE   Luiz Stiles 71 y o  male MRN: [id number]  Unit/Bed#: -01 Encounter: [id number]  Reason for Consult: JANEL (POA) on CKD    ASSESSMENT/PLAN:  JANEL (POA) on CKD:  Suspected due to post infectious glomerulonephritis with ATN, and incomplete recovery from previous JANEL in April 2022     -baseline creatinine previously 0 6-0 9   -presented with creatinine of 5 04   -creatinine currently 5 46   -follows with Dr Nick Miramontes, however requesting to be seen in [facility] office   -renal ultrasound negative for hydro   -please see previous note from 05/20/2022 to review negative serological workup   -planning on renal biopsy Thursday  Initially was on hold due to anticoagulation  [place] biopsy form on chart    -no urgent indication to start on dialysis today  Denies uremic symptoms   -strict I/O, good urine output      Access: tunneled dialysis catheter, placed 05/23/2022  Site intact       Nephrotic range proteinuria:  -most recent urine protein to creatinine ratio 12 4 g    -planning on renal biopsy Thursday    -anti HUGH 2 R and 24 hour urine for protein/creatinine and eosinophils pending       BPH:  Maintained on tamsulosin  Reports difficulty holding urine    -,may benefit from urology follow up       Gross hematuria: (resolved)  -coumadin remains on hold  Currently on heparin drip    -will continue to monitor and trend hemoglobin   -continue to follow with Urology  Serologies negative      Hyperphosphatemia:  Most recent phosphorus level 6 0   -will start on PhosLo  Continue to monitor and trend      Hypertension:  improved with addition of hydralazine   -currently on amlodipine 10 mg daily, doxazosin 1 mg at bedtime, metoprolol 100 mg daily   -added hydralazine 25 mg TID and PRN  -avoid hypotension or high fluctuations in blood pressure   -would avoid aggressive management of blood pressure due to JANEL    -recommend holding parameters antihypertensives for systolic blood pressure less than 130 mmHg       Chronic diastolic heart failure: With EF of 55% and grade 2 pseudo normal relaxation   -not on outpatient diuretics  -received IV albumin to assist with his anasarca  -will continue to monitor volume status with daily weights, fluid restriction, strict I/O      Factor 5 Leiden mutation:  With history of left lower extremity DVT  -anticoagulated with Coumadin, currently on hold and on heparin drip due to biopsy      Surgical site infection:  With previous cervical fusion on 16/19/1037 complicated by MRSA infection  Currently being treated with doxycycline      Anemia:  -hemoglobin currently 7 4   -continue to monitor and transfuse as needed for hemoglobin less than 7 0      Other:  Atrial fibrillation, hyperlipidemia, depression, WILL on CPAP       Disposition:  Requiring additional stay due to medical needs  SUBJECTIVE:  The patient is resting in his bed  He denies chest discomfort or shortness of breath  He reports feeling slightly lightheaded this morning after getting up from the toilet  He denies nausea, vomiting, diarrhea  He reports slight neck discomfort  He would like follow-up in regards to Neuro surgery and possibly removing his collar      OBJECTIVE:  Current Weight: Weight - Scale: 101 kg (223 lb 9 6 oz)  Vitals:    05/24/22 0433 05/24/22 0600 05/24/22 0732 05/24/22 0850   BP: 146/70  144/66 143/65   BP Location: Right arm      Pulse: 63  62 66   Resp: 16      Temp:   97 7 °F (36 5 °C)    TempSrc:       SpO2: 97%  97% 97%   Weight:  101 kg (223 lb 9 6 oz)     Height:           Intake/Output Summary (Last 24 hours) at 5/24/2022 0902  Last data filed at 5/24/2022 0801  Gross per 24 hour   Intake 2001 ml   Output 1425 ml   Net 576 ml     General: NAD  Skin: warm, dry, intact, no rash  HEENT: Moist mucous membranes, sclera anicteric, normocephalic, atraumatic  Neck: No apparent JVD appreciated, collar  Chest: lung sounds clear B/L, on RA, perm cath present  CVS:Regular rate and rhythm, no murmer   Abdomen: Soft, round, non-tender, +BS  Extremities: No B/L LE edema present  Neuro: alert and oriented  Psych: appropriate mood and affect     Medications:    Current Facility-Administered Medications:     acetaminophen (TYLENOL) tablet 650 mg, 650 mg, Oral, Q6H PRN, Jonelle Lou PA-C, 650 mg at 05/24/22 0854    amLODIPine (NORVASC) tablet 10 mg, 10 mg, Oral, Daily, Shavonne Silverio PA-C, 10 mg at 05/24/22 0850    docusate sodium (COLACE) capsule 100 mg, 100 mg, Oral, Daily, Jonelle Lou PA-C, 100 mg at 05/24/22 0850    doxazosin (CARDURA) tablet 1 mg, 1 mg, Oral, HS, Marci Rogers, CRNP, 1 mg at 05/23/22 2139    doxycycline hyclate (VIBRAMYCIN) capsule 100 mg, 100 mg, Oral, Q12H [address], Jonelle Lou PA-C, 100 mg at 05/24/22 0849    DULoxetine (CYMBALTA) delayed release capsule 60 mg, 60 mg, Oral, Daily, Jonelle Lou PA-C, 60 mg at 05/24/22 0849    gabapentin (NEURONTIN) capsule 100 mg, 100 mg, Oral, Daily, Jonelle Lou PA-C, 100 mg at 05/24/22 0850    gabapentin (NEURONTIN) capsule 300 mg, 300 mg, Oral, HS, Jonelle Lou PA-C, 300 mg at 05/23/22 2139    heparin (porcine) 25,000 units in 0 45% NaCl 250 mL infusion (premix), 3-30 Units/kg/hr (Order-Specific), Intravenous, Titrated, Mak Farmer DO, Last Rate: 8 mL/hr at 05/24/22 0727, 8 Units/kg/hr at 05/24/22 0727    heparin (porcine) injection 4,000 Units, 4,000 Units, Intravenous, Q1H PRN, Mak Farmer DO    heparin (porcine) injection 8,000 Units, 8,000 Units, Intravenous, Q1H PRN, Mak Farmer DO    hydrALAZINE (APRESOLINE) injection 10 mg, 10 mg, Intravenous, Q6H PRN, Radha Prieto DO    hydrALAZINE (APRESOLINE) tablet 25 mg, 25 mg, Oral, Q8H VARGAS, DEEPTHI Robertson, 25 mg at 05/24/22 0502    lidocaine (LIDODERM) 5 % patch 1 patch, 1 patch, Topical, Daily PRN, Annette Flores PA-C, 1 patch at 05/17/22 0032    lidocaine (LIDODERM) 5 % patch 2 patch, 2 patch, Topical, Daily, Mak Farmer DO, 2 patch at 05/23/22 0820    methocarbamol (ROBAXIN) tablet 750 mg, 750 mg, Oral, Q6H PRN, Scarlett Post PA-C, 750 mg at 05/24/22 0854    metoprolol succinate (TOPROL-XL) 24 hr tablet 100 mg, 100 mg, Oral, Daily, Jonelle Lou PA-C, 100 mg at 05/24/22 0850    ondansetron (ZOFRAN) injection 4 mg, 4 mg, Intravenous, Q4H PRN, Annette Flores PA-C, 4 mg at 05/19/22 1634    pantoprazole (PROTONIX) EC tablet 40 mg, 40 mg, Oral, BID AC, Jonelle Lou PA-C, 40 mg at 05/24/22 0615    pravastatin (PRAVACHOL) tablet 40 mg, 40 mg, Oral, Daily With Dinner, Scarlett Post PA-C, 40 mg at 05/23/22 1658    senna (SENOKOT) tablet 8 6 mg, 1 tablet, Oral, HS PRN, Scarlett Post PA-C, 8 6 mg at 05/24/22 0502    tamsulosin (FLOMAX) capsule 0 4 mg, 0 4 mg, Oral, Daily With Pastora Tapia PA-C, 0 4 mg at 05/23/22 1658    Laboratory Results:  Results from last 7 days   Lab Units 05/24/22  0432 05/23/22  0453 05/22/22  0608   WBC Thousand/uL 5 82 5 97 5 35   HEMOGLOBIN g/dL 7 4* 7 4* 7 9*   HEMATOCRIT % 23 4* 23 8* 24 8*   PLATELETS Thousands/uL 173 180 174   SODIUM mmol/L 139 139 139   POTASSIUM mmol/L 3 5 3 5 4 2   CHLORIDE mmol/L 103 104 104   CO2 mmol/L 27 24 25   BUN mg/dL 50* 51* 52*   CREATININE mg/dL 5 46* 5 26* 5 49*   CALCIUM mg/dL 8 4 8 2* 8 7   MAGNESIUM mg/dL 1 5* 1 5* 1 4*   PHOSPHORUS mg/dL 6 0* 5 2* 5 3*   ALK PHOS U/L 85 78 88   ALT U/L 9* 11* 10*   AST U/L 13 14 14

## 2022-05-24 NOTE — PROGRESS NOTES
[place]  Progress Note - Narinder Angeline 1950, 71 y o  male MRN: [id number]  Unit/Bed#: -Yaya Encounter: [id number]  Primary Care Provider: Josh Jacobs MD   Date and time admitted to hospital: 5/10/2022  6:51 PM    * JANEL (acute kidney injury) [facility])  Assessment & Plan  · Patient admitted with acute kidney injury felt to be most likely due to post infectious  glomerulonephritis  · Creatinine still elevated at 5 2  · Was unable have kidney biopsy due to cervical collar not allowing him to lay on his stomach and uncontrolled blood pressure  · Neurosurgery okay with patient being prone as long as he can keep the cervical collar in place  · Nephrology discussed with IR who were agreeable to have patient last on his side for the procedure    · Dr Darylene Doss recommending cervical collar remain in place for at least additional 2 weeks  · Attempt kidney biopsy this coming Thursday    Surgical site infection  Assessment & Plan  · S/p cervical fusion performed by Dr Darylene Doss on 9/34/99, complicated by MRSA infection  · Completed 24 days of IV vancomycin, transitioned to daptomycin on 04/24-4/29  Discharged home on doxycycline 100 mg b i d    · Continue doxycycline  · Will need follow up with Neurosurgery      Chronic diastolic (congestive) heart failure (HCC)  Assessment & Plan  Wt Readings from Last 3 Encounters:   05/24/22 101 kg (223 lb 9 6 oz)   05/03/22 114 kg (251 lb)   04/29/22 113 kg (250 lb 1 6 oz)     · Prior echo 04/25/2022:  EF 87%, Diastolic function: grade 2 pseudonormal relaxation  · Monitor I/O and daily weights  · Does not take outpatient diuretics  IV Lasix with minimal results during prior admission    Patient had received albumin to help with anasarca  · Fluid restriction, 2 g sodium    Glomerulonephritis  Assessment & Plan  · Diagnosed during prior admission  · Postinfectious suspected  · PermCath placed 5/23  · Re-attempt kidney biopsy on Thursday    Anemia  Assessment & Plan  · Patient has chronic anemia  He denies signs of GI  bleeding  · Hemoglobin is 7 4 today    Mixed hyperlipidemia  Assessment & Plan  · Continue statin    Paroxysmal A-fib (HCC)  Assessment & Plan  · Patient has PAF  He denies palpitations  · Patient maintained sinus rhythm  · Continue Toprol  · INR subtherapeutic 1 32  · Continue holding warfarin in anticipation of biopsy  · Continue IV heparin infusion until Thursday morning    Depression, major, single episode, moderate (HCC)  Assessment & Plan  · Continue Cymbalta    Factor V Leiden mutation [facility])  Assessment & Plan  · Patient has history of left lower extremity DVT after which he was diagnosed with factor 5 Leiden mutation  This happened years ago  · Holding Coumadin  · Resumed heparin drip continue until Thursday and discontinue early that morning    WILL (obstructive sleep apnea)  Assessment & Plan  · CPAP HS - patient brought home machine in    Essential hypertension  Assessment & Plan  · Home regimen:  Amlodipine 5 mg daily and metoprolol succinate 100 mg daily  · Nephrology added hydralazine 25 mg q 8 hours  · Consider changing hydralazine for increased dose of amlodipine after biopsy for patient tolerance    Gross hematuria-resolved as of 5/24/2022  Assessment & Plan  · Patient developed gross hematuria during this hospital stay, he has had previously, it is improving as of 5/20/2022  · Continue to hold Coumadin, heparin drip until renal biopsy can be performed  · Resolved since 05/21      VTE Pharmacologic Prophylaxis: VTE Score: 3 Moderate Risk (Score 3-4) - Pharmacological DVT Prophylaxis Ordered: heparin drip  Patient Centered Rounds: I performed bedside rounds with nursing staff today  Discussions with Specialists or Other Care Team Provider:  Reached out to Neurosurgery who will review images and discuss with team, ongoing management of surgical site   Will need c-collar in place for at least 2 more weeks  Nephrology discussed presence of collar with IR  Education and Discussions with Family / Patient: Updated  (wife) via phone  Time Spent for Care: 60 minutes  More than 50% of total time spent on counseling and coordination of care as described above  Current Length of Stay: 14 day(s)  Current Patient Status: Inpatient   Certification Statement: The patient will continue to require additional inpatient hospital stay due to Pending renal biopsy, heparin drip  Discharge Plan: Anticipate discharge in >72 hrs to home with home services  Code Status: Level 3 - DNAR and DNI    Subjective:   Patient frustrated with duration of hospitalization, hopeful nothing inhibits him from having his biopsy on Thursday  Objective:     Vitals:   Temp (24hrs), Av 7 °F (36 5 °C), Min:97 6 °F (36 4 °C), Max:97 9 °F (36 6 °C)    Temp:  [97 6 °F (36 4 °C)-97 9 °F (36 6 °C)] 97 9 °F (36 6 °C)  HR:  [59-66] 62  Resp:  [16] 16  BP: (139-148)/(65-76) 139/65  SpO2:  [92 %-98 %] 97 %  Body mass index is 31 19 kg/m²  Input and Output Summary (last 24 hours): Intake/Output Summary (Last 24 hours) at 2022 1631  Last data filed at 2022 1526  Gross per 24 hour   Intake 1761 ml   Output 1425 ml   Net 336 ml       Physical Exam:   Physical Exam  Vitals and nursing note reviewed  Constitutional:       General: He is not in acute distress  Appearance: Normal appearance  He is well-developed  HENT:      Head: Normocephalic and atraumatic  Eyes:      General: No scleral icterus  Conjunctiva/sclera: Conjunctivae normal    Neck:      Comments: Cervical collar in place, surgical incision  Cardiovascular:      Rate and Rhythm: Normal rate and regular rhythm  Heart sounds: Murmur heard  Pulmonary:      Effort: Pulmonary effort is normal       Breath sounds: No wheezing, rhonchi or rales  Abdominal:      General: There is no distension  Palpations: Abdomen is soft     Skin: General: Skin is warm and dry  Neurological:      General: No focal deficit present  Mental Status: He is alert     Psychiatric:         Mood and Affect: Mood normal                 Additional Data:     Labs:  Results from last 7 days   Lab Units 05/24/22  0432   WBC Thousand/uL 5 82   HEMOGLOBIN g/dL 7 4*   HEMATOCRIT % 23 4*   PLATELETS Thousands/uL 173     Results from last 7 days   Lab Units 05/24/22  0432   SODIUM mmol/L 139   POTASSIUM mmol/L 3 5   CHLORIDE mmol/L 103   CO2 mmol/L 27   BUN mg/dL 50*   CREATININE mg/dL 5 46*   ANION GAP mmol/L 9   CALCIUM mg/dL 8 4   ALBUMIN g/dL 2 0*   TOTAL BILIRUBIN mg/dL 0 40   ALK PHOS U/L 85   ALT U/L 9*   AST U/L 13   GLUCOSE RANDOM mg/dL 103     Results from last 7 days   Lab Units 05/24/22  0432   INR  1 32*                   Lines/Drains:  Invasive Devices  Report    Peripheral Intravenous Line  Duration           Peripheral IV 05/22/22 Distal;Left;Ventral (anterior) Forearm 1 day          Hemodialysis Catheter  Duration           HD Permanent Double Catheter 1 day                      Imaging: Reviewed radiology reports from this admission including: procedure reports    Recent Cultures (last 7 days):         Last 24 Hours Medication List:   Current Facility-Administered Medications   Medication Dose Route Frequency Provider Last Rate    acetaminophen  650 mg Oral Q6H PRN Anny Hayes PA-C      amLODIPine  10 mg Oral Daily Shavonne Jean Claude, PA-C      calcium acetate  667 mg Oral TID With Meals [place], CRNP      docusate sodium  100 mg Oral Daily Anny Hayes PA-C      doxazosin  1 mg Oral HS [place], CRNP      doxycycline hyclate  100 mg Oral Q12H [address] Anny Hayes PA-C      DULoxetine  60 mg Oral Daily Anny Hayes PA-C      gabapentin  100 mg Oral Daily Anny Hayes PA-C      gabapentin  300 mg Oral HS Anny Hayes PA-C      heparin (porcine)  3-30 Units/kg/hr (Order-Specific) Intravenous Titrated Mak Farmer DO 10 Units/kg/hr (05/24/22 1534)  heparin (porcine)  4,000 Units Intravenous Q1H PRN Pandi Todhe, DO      heparin (porcine)  8,000 Units Intravenous Q1H PRN Pandi Todhe, DO      hydrALAZINE  10 mg Intravenous Q6H PRN [place], DO      hydrALAZINE  25 mg Oral Q8H [address] DEEPTHI Romero      lidocaine  1 patch Topical Daily PRN Alcus Chantal PA-C      lidocaine  2 patch Topical Daily Pandi Todhe, DO      methocarbamol  750 mg Oral Q6H PRN Ernie Valladares PA-C      metoprolol succinate  100 mg Oral Daily Ernie Valladares PA-C      ondansetron  4 mg Intravenous Q4H PRN Annette Flores PA-C      pantoprazole  40 mg Oral BID AC Jonelle Lou PA-C      pravastatin  40 mg Oral Daily With FABIOLA Nesbitt      senna  1 tablet Oral HS PRN Ernie Valladares PA-C      tamsulosin  0 4 mg Oral Daily With Dinner Ernie Valladares PA-C          Today, Patient Was Seen By: Gallito Walter PA-C    **Please Note: This note may have been constructed using a voice recognition system  **

## 2022-05-24 NOTE — UTILIZATION REVIEW
Continued Stay Review    Date: 5/24/22                          Current Patient Class: inpatient   Current Level of Care: med surg    HPI:72 y o  male initially admitted on 5/10/22 inpatient due to JANEL  Patient is status post cervical fusion 0/66/41 complicated by MRSA infection, completed 24 days of IV vancomycin and transitioned to Daptomycin 4/24 - 4/29/22, currently on doxycycline; Glomerulonephritis diagnosed prior admission with anemia  PMH of CHF, hyperlipidemia, PAF, depression, factor V Leiden mutation on Coumadion, WILL on CPAP, hpt  Baseline creatinine < 1  On admission creatinine 5 04  Procedure 5/23/22 IR -Tunneled dialysis catheter placement      Assessment/Plan:   5/24/22  Today with some lightheadedness on getting up from toilet  Some neck discomfort  On exam perm cath present  H&H 7 4/23  4  Bun 50, creatinine 5 46  Magnesium 1 5  Phosphorous 6  For renal biopsy 5/26/22, cervical collar does not allow patient to lay on stomach  Surgeon contacted and patient to turn prone in collar then remove for procedure and replace post procedure     To continue Heparin gtt  Coumadin on hold  Start Phoslo  Hold antihypertensives for SBP < 130  Doxycycline continues and pain control in progress  Vital Signs:   05/24/22 0900 -- -- -- -- -- 92 % -- -- None (Room air) -- --   05/24/22 08:50:41 -- 66 -- 143/65 91 97 % -- -- -- -- --   05/24/22 07:32:01 97 7 °F (36 5 °C) 62 -- 144/66 92 97 % -- -- --         Pertinent Labs/Diagnostic Results:   IR tunneled dialysis catheter placement   Final Result by Paulina Amos MD (05/23 9352)      Insertion of right-sided tunneled dialysis catheter, with tip in the expected location of the right atrium  Plan:       The catheter may be used immediately  Workstation performed: [id number]         XR spine cervical 2 or 3 vw injury   Final Result by Bright Flower DO (05/20 2582)      Stable postoperative appearance           Workstation performed: [id number]         XR spine thoracolumbar 2 vw   Final Result by Andreina Zuniga MD (05/12 6767)      No acute osseous abnormality  Multilevel degenerative change particularly at L4-5 and L5-S1  Workstation performed: [id number]         IR biopsy kidney [place] kit no laterality    (Results Pending)     Results from last 7 days   Lab Units 05/24/22  0432 05/23/22  0453 05/22/22  0608 05/19/22  0519 05/18/22  0617   WBC Thousand/uL 5 82 5 97 5 35 5 92 5 08   HEMOGLOBIN g/dL 7 4* 7 4* 7 9* 8 4* 8 6*   HEMATOCRIT % 23 4* 23 8* 24 8* 27 3* 27 5*   PLATELETS Thousands/uL 173 180 174 195 195     Results from last 7 days   Lab Units 05/24/22  0432 05/23/22  0453 05/22/22  0608 05/20/22  0434 05/19/22  0519   SODIUM mmol/L 139 139 139 139 140   POTASSIUM mmol/L 3 5 3 5 4 2 3 6 3 3*   CHLORIDE mmol/L 103 104 104 103 103   CO2 mmol/L 27 24 25 26 27   ANION GAP mmol/L 9 11 10 10 10   BUN mg/dL 50* 51* 52* 50* 47*   CREATININE mg/dL 5 46* 5 26* 5 49* 5 09* 5 11*   EGFR ml/min/1 73sq m 9 10 9 10 10   CALCIUM mg/dL 8 4 8 2* 8 7 8 6 8 9   MAGNESIUM mg/dL 1 5* 1 5* 1 4*  --   --    PHOSPHORUS mg/dL 6 0* 5 2* 5 3*  --   --      Results from last 7 days   Lab Units 05/24/22  0432 05/23/22  0453 05/22/22  0608   AST U/L 13 14 14   ALT U/L 9* 11* 10*   ALK PHOS U/L 85 78 88   TOTAL PROTEIN g/dL 5 8* 5 4* 6 1*   ALBUMIN g/dL 2 0* 1 8* 2 1*   TOTAL BILIRUBIN mg/dL 0 40 0 50 0 60     Results from last 7 days   Lab Units 05/24/22  0432 05/23/22  0453 05/22/22  0608 05/20/22  0434 05/19/22  0519 05/18/22  0617   GLUCOSE RANDOM mg/dL 103 90 98 98 95 103     Results from last 7 days   Lab Units 05/24/22  0432 05/23/22  1911 05/23/22  0503 05/23/22  0453 05/22/22  2041 05/22/22  1428   PROTIME seconds 16 2*  --  17 6*  --   --  17 0*   INR  1 32*  --  1 47*  --   --  1 41*   PTT seconds 113* 109*  --  208*   < > 20*    < > = values in this interval not displayed       Results from last 7 days   Lab Units 05/18/22  1057 CLARITY UA  Cloudy   COLOR UA  Red   SPEC GRAV UA  1 020   PH UA  6 5   GLUCOSE UA mg/dl 250 (1/4%)*   KETONES UA mg/dl Negative   BLOOD UA  Large*   PROTEIN UA mg/dl >=300*   NITRITE UA  Negative   BILIRUBIN UA  Negative   UROBILINOGEN UA E U /dl 0 2   LEUKOCYTES UA  Negative   WBC UA /hpf 1-2*   RBC UA /hpf Innumerable*   BACTERIA UA /hpf Occasional   EPITHELIAL CELLS WET PREP /hpf None Seen   MUCUS THREADS  None Seen         Medications:   Scheduled Medications:  amLODIPine, 10 mg, Oral, Daily  calcium acetate, 667 mg, Oral, TID With Meals  docusate sodium, 100 mg, Oral, Daily  doxazosin, 1 mg, Oral, HS  doxycycline hyclate, 100 mg, Oral, Q12H VARGAS  DULoxetine, 60 mg, Oral, Daily  gabapentin, 100 mg, Oral, Daily  gabapentin, 300 mg, Oral, HS  hydrALAZINE, 25 mg, Oral, Q8H VARGAS  lidocaine, 2 patch, Topical, Daily  metoprolol succinate, 100 mg, Oral, Daily  pantoprazole, 40 mg, Oral, BID AC  pravastatin, 40 mg, Oral, Daily With Dinner  tamsulosin, 0 4 mg, Oral, Daily With Dinner      Continuous IV Infusions:  heparin (porcine), 3-30 Units/kg/hr (Order-Specific), Intravenous, Titrated      PRN Meds:  acetaminophen, 650 mg, Oral, Q6H PRN - used x 1   heparin (porcine), 4,000 Units, Intravenous, Q1H PRN  heparin (porcine), 8,000 Units, Intravenous, Q1H PRN  hydrALAZINE, 10 mg, Intravenous, Q6H PRN  lidocaine, 1 patch, Topical, Daily PRN  methocarbamol, 750 mg, Oral, Q6H PRN - used x 1   ondansetron, 4 mg, Intravenous, Q4H PRN  senna, 1 tablet, Oral, HS PRN        Discharge Plan: to be determined  Network Utilization Review Department  ATTENTION: Please call with any questions or concerns to [phone] and carefully listen to the prompts so that you are directed to the right person   All voicemails are confidential   Mat Bicker all requests for admission clinical reviews, approved or denied determinations and any other requests to dedicated fax number below belonging to the campus where the patient is receiving treatment   List of dedicated fax numbers for the Facilities:  [address] DENIALS (Administrative/Medical Necessity) [phone]   [address]  (Maternity/NICU/Pediatrics) [phone]   [address] [place] [address]  [address] [facility] [place] [address] [id number] [place] [address] [address] [facility] [place] [address] [phone]

## 2022-05-24 NOTE — QUICK NOTE
Pt is s/p Cervical 360 degrees decompression and fusion on 3/11/22 by Dr Mak Dudley that was complicated by MRSA infection  Pt completed IV abx and transitioned to oral abx  Pt continues to wear the Cervical brace at this time  Pt is currently admitted at [facility] for JANEL  Pt is pending a renal biopsy and his team wanted to find out if his Cervical collar can be removed and if patient is cleared for renal biopsy in the prone position? Pt had recent Xrays of Cervical spine done on 5/23/22 that were reviewed in detail  Stable hardware in place  No signs of hardware malfunction  Xrays were also reviewed by Dr Mak Dudley  Per discussion with Dr Pecola Severe,  infection slows down the fusion process  Given his recent infection, he should continue to wear the cervical brace at this time  Will obtain xrays of Cervical spine in about 2 weeks to re-assess  Should pt require any procedures, recommend that the Cervical brace is well fitted throughout the procedure  Will arrange for approximately 2 week f/u with neurosurgery  Please contact nsx with any questions or concerns in the interim

## 2022-05-24 NOTE — ASSESSMENT & PLAN NOTE
· Patient developed gross hematuria during this hospital stay, he has had previously, it is improving as of 5/20/2022  · Continue to hold Coumadin, heparin drip until renal biopsy can be performed  · Resolved since 05/21

## 2022-05-24 NOTE — ASSESSMENT & PLAN NOTE
· Diagnosed during prior admission  · Postinfectious suspected  · PermCath placed 5/23  · Re-attempt kidney biopsy on Thursday

## 2022-05-24 NOTE — ASSESSMENT & PLAN NOTE
· Patient has PAF  He denies palpitations    · Patient maintained sinus rhythm  · Continue Toprol  · INR subtherapeutic 1 32  · Continue holding warfarin in anticipation of biopsy  · Continue IV heparin infusion until Thursday morning

## 2022-05-24 NOTE — ASSESSMENT & PLAN NOTE
Wt Readings from Last 3 Encounters:   05/24/22 101 kg (223 lb 9 6 oz)   05/03/22 114 kg (251 lb)   04/29/22 113 kg (250 lb 1 6 oz)     · Prior echo 04/25/2022:  EF 54%, Diastolic function: grade 2 pseudonormal relaxation  · Monitor I/O and daily weights  · Does not take outpatient diuretics  IV Lasix with minimal results during prior admission    Patient had received albumin to help with anasarca  · Fluid restriction, 2 g sodium

## 2022-05-25 LAB
ANION GAP SERPL CALCULATED.3IONS-SCNC: 9 MMOL/L (ref 4–13)
APTT PPP: 209 SECONDS (ref 23–37)
APTT PPP: 67 SECONDS (ref 23–37)
APTT PPP: 69 SECONDS (ref 23–37)
BASOPHILS # BLD AUTO: 0.02 THOUSANDS/ΜL (ref 0–0.1)
BASOPHILS NFR BLD AUTO: 0 % (ref 0–1)
BUN SERPL-MCNC: 54 MG/DL (ref 5–25)
CALCIUM SERPL-MCNC: 8.5 MG/DL (ref 8.3–10.1)
CHLORIDE SERPL-SCNC: 103 MMOL/L (ref 100–108)
CO2 SERPL-SCNC: 26 MMOL/L (ref 21–32)
CREAT SERPL-MCNC: 5.44 MG/DL (ref 0.6–1.3)
EOSINOPHIL # BLD AUTO: 0.18 THOUSAND/ΜL (ref 0–0.61)
EOSINOPHIL NFR BLD AUTO: 4 % (ref 0–6)
ERYTHROCYTE [DISTWIDTH] IN BLOOD BY AUTOMATED COUNT: 14.2 % (ref 11.6–15.1)
GFR SERPL CREATININE-BSD FRML MDRD: 9 ML/MIN/1.73SQ M
GLUCOSE SERPL-MCNC: 103 MG/DL (ref 65–140)
HCT VFR BLD AUTO: 23.8 % (ref 36.5–49.3)
HGB BLD-MCNC: 7.5 G/DL (ref 12–17)
IMM GRANULOCYTES # BLD AUTO: 0.02 THOUSAND/UL (ref 0–0.2)
IMM GRANULOCYTES NFR BLD AUTO: 0 % (ref 0–2)
INR PPP: 1.34 (ref 0.84–1.19)
LYMPHOCYTES # BLD AUTO: 1.28 THOUSANDS/ΜL (ref 0.6–4.47)
LYMPHOCYTES NFR BLD AUTO: 25 % (ref 14–44)
MCH RBC QN AUTO: 28.5 PG (ref 26.8–34.3)
MCHC RBC AUTO-ENTMCNC: 31.5 G/DL (ref 31.4–37.4)
MCV RBC AUTO: 91 FL (ref 82–98)
MONOCYTES # BLD AUTO: 0.43 THOUSAND/ΜL (ref 0.17–1.22)
MONOCYTES NFR BLD AUTO: 9 % (ref 4–12)
NEUTROPHILS # BLD AUTO: 3.11 THOUSANDS/ΜL (ref 1.85–7.62)
NEUTS SEG NFR BLD AUTO: 62 % (ref 43–75)
NRBC BLD AUTO-RTO: 0 /100 WBCS
PLATELET # BLD AUTO: 165 THOUSANDS/UL (ref 149–390)
PMV BLD AUTO: 10.7 FL (ref 8.9–12.7)
POTASSIUM SERPL-SCNC: 3.6 MMOL/L (ref 3.5–5.3)
PROT 24H UR-MCNC: 4620 MG/24 HRS (ref 40–150)
PROTHROMBIN TIME: 16.4 SECONDS (ref 11.6–14.5)
RBC # BLD AUTO: 2.63 MILLION/UL (ref 3.88–5.62)
SODIUM SERPL-SCNC: 138 MMOL/L (ref 136–145)
SPECIMEN VOL UR: 2000 ML
WBC # BLD AUTO: 5.04 THOUSAND/UL (ref 4.31–10.16)

## 2022-05-25 PROCEDURE — 80048 BASIC METABOLIC PNL TOTAL CA: CPT | Performed by: PHYSICIAN ASSISTANT

## 2022-05-25 PROCEDURE — 94760 N-INVAS EAR/PLS OXIMETRY 1: CPT

## 2022-05-25 PROCEDURE — 99232 SBSQ HOSP IP/OBS MODERATE 35: CPT | Performed by: PHYSICIAN ASSISTANT

## 2022-05-25 PROCEDURE — 85730 THROMBOPLASTIN TIME PARTIAL: CPT | Performed by: HOSPITALIST

## 2022-05-25 PROCEDURE — 85610 PROTHROMBIN TIME: CPT | Performed by: HOSPITALIST

## 2022-05-25 PROCEDURE — 99233 SBSQ HOSP IP/OBS HIGH 50: CPT | Performed by: INTERNAL MEDICINE

## 2022-05-25 PROCEDURE — 85025 COMPLETE CBC W/AUTO DIFF WBC: CPT | Performed by: PHYSICIAN ASSISTANT

## 2022-05-25 PROCEDURE — 85730 THROMBOPLASTIN TIME PARTIAL: CPT | Performed by: PHYSICIAN ASSISTANT

## 2022-05-25 PROCEDURE — 87205 SMEAR GRAM STAIN: CPT | Performed by: INTERNAL MEDICINE

## 2022-05-25 RX ADMIN — PANTOPRAZOLE SODIUM 40 MG: 40 TABLET, DELAYED RELEASE ORAL at 05:02

## 2022-05-25 RX ADMIN — HYDRALAZINE HYDROCHLORIDE 25 MG: 25 TABLET ORAL at 22:02

## 2022-05-25 RX ADMIN — GABAPENTIN 300 MG: 300 CAPSULE ORAL at 22:02

## 2022-05-25 RX ADMIN — AMLODIPINE BESYLATE 10 MG: 5 TABLET ORAL at 09:14

## 2022-05-25 RX ADMIN — PRAVASTATIN SODIUM 40 MG: 40 TABLET ORAL at 18:29

## 2022-05-25 RX ADMIN — ACETAMINOPHEN 650 MG: 325 TABLET, FILM COATED ORAL at 22:02

## 2022-05-25 RX ADMIN — CALCIUM ACETATE 667 MG: 667 CAPSULE ORAL at 18:29

## 2022-05-25 RX ADMIN — CALCIUM ACETATE 667 MG: 667 CAPSULE ORAL at 09:14

## 2022-05-25 RX ADMIN — METOPROLOL SUCCINATE 100 MG: 50 TABLET, EXTENDED RELEASE ORAL at 09:14

## 2022-05-25 RX ADMIN — CALCIUM ACETATE 667 MG: 667 CAPSULE ORAL at 14:02

## 2022-05-25 RX ADMIN — DULOXETINE 60 MG: 30 CAPSULE, DELAYED RELEASE ORAL at 09:14

## 2022-05-25 RX ADMIN — DOXYCYCLINE 100 MG: 100 CAPSULE ORAL at 20:16

## 2022-05-25 RX ADMIN — DOXAZOSIN 1 MG: 1 TABLET ORAL at 22:02

## 2022-05-25 RX ADMIN — GABAPENTIN 100 MG: 100 CAPSULE ORAL at 09:15

## 2022-05-25 RX ADMIN — TAMSULOSIN HYDROCHLORIDE 0.4 MG: 0.4 CAPSULE ORAL at 18:29

## 2022-05-25 RX ADMIN — LIDOCAINE 5% 2 PATCH: 700 PATCH TOPICAL at 09:15

## 2022-05-25 RX ADMIN — HYDRALAZINE HYDROCHLORIDE 25 MG: 25 TABLET ORAL at 05:02

## 2022-05-25 RX ADMIN — DOXYCYCLINE 100 MG: 100 CAPSULE ORAL at 09:14

## 2022-05-25 RX ADMIN — HEPARIN SODIUM 9 UNITS/KG/HR: 10000 INJECTION, SOLUTION INTRAVENOUS at 09:54

## 2022-05-25 RX ADMIN — SENNOSIDES 8.6 MG: 8.6 TABLET, FILM COATED ORAL at 09:15

## 2022-05-25 RX ADMIN — PANTOPRAZOLE SODIUM 40 MG: 40 TABLET, DELAYED RELEASE ORAL at 18:29

## 2022-05-25 RX ADMIN — DOCUSATE SODIUM 100 MG: 100 CAPSULE, LIQUID FILLED ORAL at 09:15

## 2022-05-25 NOTE — PLAN OF CARE
Problem: PAIN - ADULT  Goal: Verbalizes/displays adequate comfort level or baseline comfort level  Description: Interventions:  - Encourage patient to monitor pain and request assistance  - Assess pain using appropriate pain scale  - Administer analgesics based on type and severity of pain and evaluate response  - Implement non-pharmacological measures as appropriate and evaluate response  - Consider cultural and social influences on pain and pain management  - Notify physician/advanced practitioner if interventions unsuccessful or patient reports new pain  Outcome: Progressing     Problem: INFECTION - ADULT  Goal: Absence or prevention of progression during hospitalization  Description: INTERVENTIONS:  - Assess and monitor for signs and symptoms of infection  - Monitor lab/diagnostic results  - Monitor all insertion sites, i e  indwelling lines, tubes, and drains  - Monitor endotracheal if appropriate and nasal secretions for changes in amount and color  - [place] appropriate cooling/warming therapies per order  - Administer medications as ordered  - Instruct and encourage patient and family to use good hand hygiene technique  - Identify and instruct in appropriate isolation precautions for identified infection/condition  Outcome: Progressing     Problem: DISCHARGE PLANNING  Goal: Discharge to home or other facility with appropriate resources  Description: INTERVENTIONS:  - Identify barriers to discharge w/patient and caregiver  - Arrange for needed discharge resources and transportation as appropriate  - Identify discharge learning needs (meds, wound care, etc )  - Arrange for interpretive services to assist at discharge as needed  - Refer to Case Management Department for coordinating discharge planning if the patient needs post-hospital services based on physician/advanced practitioner order or complex needs related to functional status, cognitive ability, or social support system  Outcome: Progressing Problem: Knowledge Deficit  Goal: Patient/family/caregiver demonstrates understanding of disease process, treatment plan, medications, and discharge instructions  Description: Complete learning assessment and assess knowledge base  Interventions:  - Provide teaching at level of understanding  - Provide teaching via preferred learning methods  Outcome: Progressing     Problem: MOBILITY - ADULT  Goal: Maintain or return to baseline ADL function  Description: INTERVENTIONS:  -  Assess patient's ability to carry out ADLs; assess patient's baseline for ADL function and identify physical deficits which impact ability to perform ADLs (bathing, care of mouth/teeth, toileting, grooming, dressing, etc )  - Assess/evaluate cause of self-care deficits   - Assess range of motion  - Assess patient's mobility; develop plan if impaired  - Assess patient's need for assistive devices and provide as appropriate  - Encourage maximum independence but intervene and supervise when necessary  - Involve family in performance of ADLs  - Assess for home care needs following discharge   - Consider OT consult to assist with ADL evaluation and planning for discharge  - Provide patient education as appropriate  Outcome: Progressing  Goal: Maintains/Returns to pre admission functional level  Description: INTERVENTIONS:  - Perform BMAT or MOVE assessment daily    - Set and communicate daily mobility goal to care team and patient/family/caregiver     - Collaborate with rehabilitation services on mobility goals if consulted  - Out of bed for toileting  - Record patient progress and toleration of activity level   Outcome: Progressing

## 2022-05-25 NOTE — PLAN OF CARE
Problem: PAIN - ADULT  Goal: Verbalizes/displays adequate comfort level or baseline comfort level  Description: Interventions:  - Encourage patient to monitor pain and request assistance  - Assess pain using appropriate pain scale  - Administer analgesics based on type and severity of pain and evaluate response  - Implement non-pharmacological measures as appropriate and evaluate response  - Consider cultural and social influences on pain and pain management  - Notify physician/advanced practitioner if interventions unsuccessful or patient reports new pain  5/25/2022 0009 by Eunice Ramirez RN  Outcome: Progressing  5/24/2022 2255 by Eunice Ramirez RN  Outcome: Progressing     Problem: INFECTION - ADULT  Goal: Absence or prevention of progression during hospitalization  Description: INTERVENTIONS:  - Assess and monitor for signs and symptoms of infection  - Monitor lab/diagnostic results  - Monitor all insertion sites, i e  indwelling lines, tubes, and drains  - Monitor endotracheal if appropriate and nasal secretions for changes in amount and color  - [place] appropriate cooling/warming therapies per order  - Administer medications as ordered  - Instruct and encourage patient and family to use good hand hygiene technique  - Identify and instruct in appropriate isolation precautions for identified infection/condition  5/25/2022 0009 by Eunice Ramirez RN  Outcome: Progressing  5/24/2022 2255 by Eunice Ramirez RN  Outcome: Progressing     Problem: SAFETY ADULT  Goal: Patient will remain free of falls  Description: INTERVENTIONS:  - Educate patient/family on patient safety including physical limitations  - Instruct patient to call for assistance with activity   - Consult OT/PT to assist with strengthening/mobility   - Keep Call bell within reach  - Keep bed low and locked with side rails adjusted as appropriate  - Keep care items and personal belongings within reach  - Initiate and maintain comfort rounds  - Make Fall Risk Sign visible to staff  - Offer Toileting every 2 Hours, in advance of need  - Initiate/Maintain alarm  - Obtain necessary fall risk management equipment:   - Apply yellow socks and bracelet for high fall risk patients  - Consider moving patient to room near nurses station  5/25/2022 0009 by Alonso Canela RN  Outcome: Progressing  5/24/2022 2255 by Alonso Canela RN  Outcome: Progressing  Goal: Maintain or return to baseline ADL function  Description: INTERVENTIONS:  -  Assess patient's ability to carry out ADLs; assess patient's baseline for ADL function and identify physical deficits which impact ability to perform ADLs (bathing, care of mouth/teeth, toileting, grooming, dressing, etc )  - Assess/evaluate cause of self-care deficits   - Assess range of motion  - Assess patient's mobility; develop plan if impaired  - Assess patient's need for assistive devices and provide as appropriate  - Encourage maximum independence but intervene and supervise when necessary  - Involve family in performance of ADLs  - Assess for home care needs following discharge   - Consider OT consult to assist with ADL evaluation and planning for discharge  - Provide patient education as appropriate  Outcome: Progressing  Goal: Maintains/Returns to pre admission functional level  Description: INTERVENTIONS:  - Perform BMAT or MOVE assessment daily    - Set and communicate daily mobility goal to care team and patient/family/caregiver     - Collaborate with rehabilitation services on mobility goals if consulted  - Out of bed for toileting  - Record patient progress and toleration of activity level   Outcome: Progressing     Problem: MOBILITY - ADULT  Goal: Maintain or return to baseline ADL function  Description: INTERVENTIONS:  -  Assess patient's ability to carry out ADLs; assess patient's baseline for ADL function and identify physical deficits which impact ability to perform ADLs (bathing, care of mouth/teeth, toileting, grooming, dressing, etc )  - Assess/evaluate cause of self-care deficits   - Assess range of motion  - Assess patient's mobility; develop plan if impaired  - Assess patient's need for assistive devices and provide as appropriate  - Encourage maximum independence but intervene and supervise when necessary  - Involve family in performance of ADLs  - Assess for home care needs following discharge   - Consider OT consult to assist with ADL evaluation and planning for discharge  - Provide patient education as appropriate  Outcome: Progressing  Goal: Maintains/Returns to pre admission functional level  Description: INTERVENTIONS:  - Perform BMAT or MOVE assessment daily    - Set and communicate daily mobility goal to care team and patient/family/caregiver     - Collaborate with rehabilitation services on mobility goals if consulted  - Out of bed for toileting  - Record patient progress and toleration of activity level   Outcome: Progressing     Problem: Potential for Falls  Goal: Patient will remain free of falls  Description: INTERVENTIONS:  - Educate patient/family on patient safety including physical limitations  - Instruct patient to call for assistance with activity   - Consult OT/PT to assist with strengthening/mobility   - Keep Call bell within reach  - Keep bed low and locked with side rails adjusted as appropriate  - Keep care items and personal belongings within reach  - Initiate and maintain comfort rounds  - Make Fall Risk Sign visible to staff  - Offer Toileting every 2 Hours, in advance of need  - Initiate/Maintain alarm  - Obtain necessary fall risk management equipment:   - Apply yellow socks and bracelet for high fall risk patients  - Consider moving patient to room near nurses station  5/25/2022 0009 by Vamsi Angel, RN  Outcome: Progressing  5/24/2022 7305 by Vamsi Angel, RN  Outcome: Progressing

## 2022-05-25 NOTE — PROGRESS NOTES
[place]  Progress Note - Marj Valerio 1950, 71 y o  male MRN: [id number]  Unit/Bed#: -01 Encounter: [id number]  Primary Care Provider: Chely Glynn MD   Date and time admitted to hospital: 5/10/2022  6:51 PM    * JANEL (acute kidney injury) [facility])  Assessment & Plan  · Patient admitted with acute kidney injury felt to be most likely due to post infectious  glomerulonephritis  · Creatinine still elevated at 5 2  · Was unable have kidney biopsy due to cervical collar not allowing him to lay on his stomach and uncontrolled blood pressure  · Neurosurgery okay with patient being prone as long as he can keep the cervical collar in place  · Nephrology discussed with IR who were agreeable to have patient last on his side for the procedure    · Dr Mak Dudley recommending cervical collar remain in place for at least additional 2 weeks  · Attempt kidney biopsy this coming Thursday    Surgical site infection  Assessment & Plan  · S/p cervical fusion performed by Dr Mak Dudley on 4/48/36, complicated by MRSA infection  · Completed 24 days of IV vancomycin, transitioned to daptomycin on 04/24-4/29  Discharged home on doxycycline 100 mg b i d    · Continue doxycycline  · Will need follow up with Neurosurgery      Chronic diastolic (congestive) heart failure (HCC)  Assessment & Plan  Wt Readings from Last 3 Encounters:   05/25/22 102 kg (224 lb 3 2 oz)   05/03/22 114 kg (251 lb)   04/29/22 113 kg (250 lb 1 6 oz)     · Prior echo 04/25/2022:  EF 41%, Diastolic function: grade 2 pseudonormal relaxation  · Monitor I/O and daily weights  · Does not take outpatient diuretics  IV Lasix with minimal results during prior admission    Patient had received albumin to help with anasarca  · Fluid restriction, 2 g sodium    Glomerulonephritis  Assessment & Plan  · Diagnosed during prior admission  · Postinfectious suspected  · PermCath placed 5/23  · Re-attempt kidney biopsy on Thursday    Anemia  Assessment & Plan  · Patient has chronic anemia  He denies signs of GI  bleeding  · Hemoglobin is 7 4 today    Mixed hyperlipidemia  Assessment & Plan  · Continue statin    Paroxysmal A-fib (HCC)  Assessment & Plan  · Patient has PAF  He denies palpitations  · Patient maintained sinus rhythm  · Continue Toprol  · INR subtherapeutic 1 32  · Continue holding warfarin in anticipation of biopsy  · Continue IV heparin infusion until Thursday morning    Depression, major, single episode, moderate (HCC)  Assessment & Plan  · Continue Cymbalta    Factor V Leiden mutation [facility])  Assessment & Plan  · Patient has history of left lower extremity DVT after which he was diagnosed with factor 5 Leiden mutation  This happened years ago  · Holding Coumadin  · Resumed heparin drip continue until Thursday and discontinue early that morning    WILL (obstructive sleep apnea)  Assessment & Plan  · CPAP HS - patient brought home machine in    Essential hypertension  Assessment & Plan  · Home regimen:  Amlodipine 5 mg daily and metoprolol succinate 100 mg daily  · Nephrology added hydralazine 25 mg q 8 hours  · Consider changing hydralazine for increased dose of amlodipine after biopsy for patient tolerance      VTE Pharmacologic Prophylaxis: VTE Score: 3 Moderate Risk (Score 3-4) - Pharmacological DVT Prophylaxis Ordered: heparin drip  Patient Centered Rounds: I performed bedside rounds with nursing staff today  Discussions with Specialists or Other Care Team Provider: Discussed with Nephrology    Education and Discussions with Family / Patient: Updated  (wife) via phone  Time Spent for Care: 30 minutes  More than 50% of total time spent on counseling and coordination of care as described above      Current Length of Stay: 15 day(s)  Current Patient Status: Inpatient   Certification Statement: The patient will continue to require additional inpatient hospital stay due to Pending renal biopsy  Discharge Plan: Anticipate discharge in 24-48 hrs to home with home services  Code Status: Level 3 - DNAR and DNI    Subjective:   Patient feels better today, eager for his biopsy to be performed tomorrow so that he can ultimately return home to be with his wife  Objective:     Vitals:   Temp (24hrs), Av 8 °F (36 6 °C), Min:97 6 °F (36 4 °C), Max:97 9 °F (36 6 °C)    Temp:  [97 6 °F (36 4 °C)-97 9 °F (36 6 °C)] 97 6 °F (36 4 °C)  HR:  [59-62] 59  Resp:  [16-20] 20  BP: (120-139)/(63-67) 120/63  SpO2:  [94 %-97 %] 96 %  Body mass index is 31 27 kg/m²  Input and Output Summary (last 24 hours): Intake/Output Summary (Last 24 hours) at 2022 1428  Last data filed at 2022 1405  Gross per 24 hour   Intake 1790 ml   Output 2955 ml   Net -1165 ml       Physical Exam:   Physical Exam  Vitals and nursing note reviewed  Constitutional:       General: He is not in acute distress  Appearance: Normal appearance  He is well-developed  HENT:      Head: Normocephalic and atraumatic  Eyes:      General: No scleral icterus  Conjunctiva/sclera: Conjunctivae normal    Cardiovascular:      Rate and Rhythm: Normal rate and regular rhythm  Heart sounds: No murmur heard  Pulmonary:      Effort: Pulmonary effort is normal       Breath sounds: No wheezing, rhonchi or rales  Abdominal:      General: There is no distension  Palpations: Abdomen is soft  Skin:     General: Skin is warm and dry  Neurological:      General: No focal deficit present  Mental Status: He is alert     Psychiatric:         Mood and Affect: Mood normal           Additional Data:     Labs:  Results from last 7 days   Lab Units 22  0455   WBC Thousand/uL 5 04   HEMOGLOBIN g/dL 7 5*   HEMATOCRIT % 23 8*   PLATELETS Thousands/uL 165   NEUTROS PCT % 62   LYMPHS PCT % 25   MONOS PCT % 9   EOS PCT % 4     Results from last 7 days   Lab Units 22  0455 22  0432   SODIUM mmol/L 138 139 POTASSIUM mmol/L 3 6 3 5   CHLORIDE mmol/L 103 103   CO2 mmol/L 26 27   BUN mg/dL 54* 50*   CREATININE mg/dL 5 44* 5 46*   ANION GAP mmol/L 9 9   CALCIUM mg/dL 8 5 8 4   ALBUMIN g/dL  --  2 0*   TOTAL BILIRUBIN mg/dL  --  0 40   ALK PHOS U/L  --  85   ALT U/L  --  9*   AST U/L  --  13   GLUCOSE RANDOM mg/dL 103 103     Results from last 7 days   Lab Units 05/25/22  0455   INR  1 34*                   Lines/Drains:  Invasive Devices  Report    Peripheral Intravenous Line  Duration           Peripheral IV 05/22/22 Distal;Left;Ventral (anterior) Forearm 2 days          Hemodialysis Catheter  Duration           HD Permanent Double Catheter 2 days                      Imaging: No pertinent imaging reviewed      Recent Cultures (last 7 days):         Last 24 Hours Medication List:   Current Facility-Administered Medications   Medication Dose Route Frequency Provider Last Rate    acetaminophen  650 mg Oral Q6H PRN Deborrah Jacquelyn, PA-C      amLODIPine  10 mg Oral Daily Shavonne Jean Claude, PA-C      calcium acetate  667 mg Oral TID With Meals DEEPTHI Romero      docusate sodium  100 mg Oral Daily Deborrah Jacquelyn, FABIOLA      doxazosin  1 mg Oral HS DEEPTHI Romero      doxycycline hyclate  100 mg Oral Q12H [address] DeborrVEENA Churchill-ALTAGRACIA      DULoxetine  60 mg Oral Daily Deborrah Jacquelyn, PA-C      gabapentin  100 mg Oral Daily Deborrah Jacquelyn, PA-C      gabapentin  300 mg Oral HS Deborrah Jacquelyn, PA-C      heparin (porcine)  3-30 Units/kg/hr (Order-Specific) Intravenous Titrated Pandi Todhe, DO 9 Units/kg/hr (05/25/22 0954)    heparin (porcine)  4,000 Units Intravenous Q1H PRN Pandi Todhe, DO      heparin (porcine)  8,000 Units Intravenous Q1H PRN Pandi Todhe, DO      hydrALAZINE  10 mg Intravenous Q6H PRN Caleb Jeffrey, DO      hydrALAZINE  25 mg Oral Q8H [address] DEEPTHI Romero      lidocaine  1 patch Topical Daily PRN Annette Flores PA-C      lidocaine  2 patch Topical Daily Pandi Todhe, DO      methocarbamol  750 mg Oral Q6H PRN Mariela Fermin FABIOLA Lou      metoprolol succinate  100 mg Oral Daily Antonia Cotto PA-C      ondansetron  4 mg Intravenous Q4H PRN Annette Flores PA-C      pantoprazole  40 mg Oral BID AC Jonelle Lou PA-C      pravastatin  40 mg Oral Daily With FABIOLA Nesbitt      senna  1 tablet Oral HS PRN Antonia Cotto PA-C      tamsulosin  0 4 mg Oral Daily With Dinner Antonia Cotto PA-C          Today, Patient Was Seen By: Serenity Carney PA-C    **Please Note: This note may have been constructed using a voice recognition system  **

## 2022-05-25 NOTE — ASSESSMENT & PLAN NOTE
· Patient admitted with acute kidney injury felt to be most likely due to post infectious  glomerulonephritis  · Creatinine still elevated at 5 2  · Was unable have kidney biopsy due to cervical collar not allowing him to lay on his stomach and uncontrolled blood pressure    · Neurosurgery okay with patient being prone as long as he can keep the cervical collar in place  · Nephrology discussed with IR who were agreeable to have patient last on his side for the procedure    · Dr Pilar Mohan recommending cervical collar remain in place for at least additional 2 weeks  · Attempt kidney biopsy this coming Thursday

## 2022-05-25 NOTE — ASSESSMENT & PLAN NOTE
Wt Readings from Last 3 Encounters:   05/25/22 102 kg (224 lb 3 2 oz)   05/03/22 114 kg (251 lb)   04/29/22 113 kg (250 lb 1 6 oz)     · Prior echo 04/25/2022:  EF 10%, Diastolic function: grade 2 pseudonormal relaxation  · Monitor I/O and daily weights  · Does not take outpatient diuretics  IV Lasix with minimal results during prior admission    Patient had received albumin to help with anasarca  · Fluid restriction, 2 g sodium

## 2022-05-25 NOTE — PROGRESS NOTES
NEPHROLOGY PROGRESS NOTE   Luiz Stiles 71 y o  male MRN: [id number]  Unit/Bed#: -01 Encounter: [id number]  Reason for Consult: JANEL (POA) on CKD     ASSESSMENT/PLAN:  JANEL (POA) on CKD:  Suspected due to post infectious glomerulonephritis with ATN, and incomplete recovery from previous JANEL in April 2022     -baseline creatinine previously 0 6-0 9   -presented with creatinine of 5 04   -creatinine currently stable at 5 4   -follows with Dr Nick Miramontes, however requesting to be seen in [facility] office  Message sent to office to arrange    -renal ultrasound negative for hydro   -please see previous note from 05/20/2022 to review negative serological workup   -planning on renal biopsy Thursday  Initially was on hold due to anticoagulation   Jostin biopsy form on chart  Verified that lab can utilize Jared  Also spoke with YESENIA and makenzie for biopsy laterally  Cervical collar will need to remain in place  Consider desmopressin to reduce bleeding risk    -no urgent indication to start on dialysis today  Denies uremic symptoms   -strict I/O, good urine output      Access: tunneled dialysis catheter, placed 05/23/2022  Site intact       Nephrotic range proteinuria: unclear cause, 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    -most recent urine protein to creatinine ratio 12 4 g    -planning on renal biopsy Thursday    -anti HUGH 2 R pending,   -24 hour urine creatinine 0 8, total urine volume 2 L  -24 hour urine protein, 4 6 g      BPH:  Maintained on tamsulosin  Reports difficulty holding urine    -may benefit from urology follow up       Gross hematuria: (resolved)  -coumadin remains on hold  Currently on heparin drip    -will continue to monitor and trend hemoglobin   -serologies negative      Hyperphosphatemia:  Most recent phosphorus level 6 0   -continue on PhosLo    Continue to monitor and trend      Hypertension:  improved with addition of hydralazine   -currently on amlodipine 10 mg daily, doxazosin 1 mg at bedtime, metoprolol 100 mg daily, and hydralazine 25 mg TID  -avoid hypotension or high fluctuations in blood pressure   -recommend holding parameters antihypertensives for systolic blood pressure less than 130 mmHg       Chronic diastolic heart failure:  With EF of 55% and grade 2 pseudo normal relaxation   -not on outpatient diuretics  -received IV albumin to assist with his anasarca  -will continue to monitor volume status with daily weights, fluid restriction, strict I/O      Factor 5 Leiden mutation:  With history of left lower extremity DVT  -anticoagulated with Coumadin, currently on hold and on heparin drip due to biopsy      Surgical site infection:  With previous cervical fusion on 95/98/2858 complicated by MRSA infection   Currently being treated with doxycycline  -per neurosurgery note, cervical collar will remain in place for at least additional of 2 weeks      Anemia:  -hemoglobin currently 7 4   -continue to monitor and transfuse as needed for hemoglobin less than 7 0      Other:  Atrial fibrillation, hyperlipidemia, depression, WILL on CPAP    Disposition:  Requiring additional stay due to medical needs  SUBJECTIVE:  Patient is resting in his bed  He appears be comfortable  He denies chest discomfort or shortness of breath      OBJECTIVE:  Current Weight: Weight - Scale: 102 kg (224 lb 3 2 oz)  Vitals:    05/24/22 1526 05/24/22 2200 05/25/22 0533 05/25/22 0732   BP: 139/65   139/67   BP Location: Right arm   Left arm   Pulse: 62   62   Resp: 16      Temp: 97 9 °F (36 6 °C)   97 8 °F (36 6 °C)   TempSrc: Oral   Oral   SpO2: 97% 97%  94%   Weight:   102 kg (224 lb 3 2 oz)    Height:           Intake/Output Summary (Last 24 hours) at 5/25/2022 8764  Last data filed at 5/25/2022 0732  Gross per 24 hour   Intake 600 ml   Output 2325 ml   Net -1725 ml     General: NAD  Skin: warm, dry, intact, no rash  HEENT: Moist mucous membranes, sclera anicteric, normocephalic, atraumatic  Neck: No apparent JVD appreciated, cervical collar in place  Chest: lung sounds clear B/L, on RA, dialysis catheter intact   CVS:Regular rate and rhythm, no murmer   Abdomen: Soft, round, non-tender, +BS  Extremities: No B/L LE edema present  Neuro: alert and oriented  Psych: appropriate mood and affect     Medications:    Current Facility-Administered Medications:     acetaminophen (TYLENOL) tablet 650 mg, 650 mg, Oral, Q6H PRN, Bill Linda PA-C, 650 mg at 05/24/22 2129    amLODIPine (NORVASC) tablet 10 mg, 10 mg, Oral, Daily, Shavonne Silverio PA-C, 10 mg at 05/24/22 0850    calcium acetate (PHOSLO) capsule 667 mg, 667 mg, Oral, TID With Meals, DEEPTHI Gale, 667 mg at 05/24/22 1547    docusate sodium (COLACE) capsule 100 mg, 100 mg, Oral, Daily, Jonelle Lou PA-C, 100 mg at 05/24/22 0850    doxazosin (CARDURA) tablet 1 mg, 1 mg, Oral, HS, DEEPTHI Gale, 1 mg at 05/24/22 2121    doxycycline hyclate (VIBRAMYCIN) capsule 100 mg, 100 mg, Oral, Q12H [address], Jonelle Lou PA-C, 100 mg at 05/24/22 2121    DULoxetine (CYMBALTA) delayed release capsule 60 mg, 60 mg, Oral, Daily, Jonelle Lou PA-C, 60 mg at 05/24/22 0849    gabapentin (NEURONTIN) capsule 100 mg, 100 mg, Oral, Daily, Jonelle Lou PA-C, 100 mg at 05/24/22 0850    gabapentin (NEURONTIN) capsule 300 mg, 300 mg, Oral, HS, Jonelle Lou PA-C, 300 mg at 05/24/22 2121    heparin (porcine) 25,000 units in 0 45% NaCl 250 mL infusion (premix), 3-30 Units/kg/hr (Order-Specific), Intravenous, Titrated, Mak Farmer DO, Last Rate: 9 mL/hr at 05/25/22 0747, 9 Units/kg/hr at 05/25/22 0747    heparin (porcine) injection 4,000 Units, 4,000 Units, Intravenous, Q1H PRN, Mak Farmer DO, 4,000 Units at 05/24/22 2300    heparin (porcine) injection 8,000 Units, 8,000 Units, Intravenous, Q1H PRN, Mak Farmer DO    hydrALAZINE (APRESOLINE) injection 10 mg, 10 mg, Intravenous, Q6H PRN, Claudene Spates, DO    hydrALAZINE (APRESOLINE) tablet 25 mg, 25 mg, Oral, Q8H [facility], DEEPTHI Gale, 25 mg at 05/25/22 0502    lidocaine (LIDODERM) 5 % patch 1 patch, 1 patch, Topical, Daily PRN, Annette Flores PA-C, 1 patch at 05/17/22 0032    lidocaine (LIDODERM) 5 % patch 2 patch, 2 patch, Topical, Daily, Judsonjan HartleyapoorvabjornDO, 2 patch at 05/23/22 0820    methocarbamol (ROBAXIN) tablet 750 mg, 750 mg, Oral, Q6H PRN, Antonia Cotto PA-C, 750 mg at 05/24/22 0854    metoprolol succinate (TOPROL-XL) 24 hr tablet 100 mg, 100 mg, Oral, Daily, Jonelle Lou PA-C, 100 mg at 05/24/22 0850    ondansetron (ZOFRAN) injection 4 mg, 4 mg, Intravenous, Q4H PRN, Annette Flores PA-C, 4 mg at 05/24/22 2200    pantoprazole (PROTONIX) EC tablet 40 mg, 40 mg, Oral, BID AC, Jonelel Lou PA-C, 40 mg at 05/25/22 0502    pravastatin (PRAVACHOL) tablet 40 mg, 40 mg, Oral, Daily With Dinner, Antonia Cotto PA-C, 40 mg at 05/24/22 1547    senna (SENOKOT) tablet 8 6 mg, 1 tablet, Oral, HS PRN, Antonia Cotto PA-C, 8 6 mg at 05/24/22 0502    tamsulosin (FLOMAX) capsule 0 4 mg, 0 4 mg, Oral, Daily With FABIOLA Nesbitt, 0 4 mg at 05/24/22 1547    Laboratory Results:  Results from last 7 days   Lab Units 05/25/22  0455 05/24/22  0432 05/23/22  0453 05/22/22  0608   WBC Thousand/uL 5 04 5 82 5 97 5 35   HEMOGLOBIN g/dL 7 5* 7 4* 7 4* 7 9*   HEMATOCRIT % 23 8* 23 4* 23 8* 24 8*   PLATELETS Thousands/uL 165 173 180 174   SODIUM mmol/L 138 139 139 139   POTASSIUM mmol/L 3 6 3 5 3 5 4 2   CHLORIDE mmol/L 103 103 104 104   CO2 mmol/L 26 27 24 25   BUN mg/dL 54* 50* 51* 52*   CREATININE mg/dL 5 44* 5 46* 5 26* 5 49*   CALCIUM mg/dL 8 5 8 4 8 2* 8 7   MAGNESIUM mg/dL  --  1 5* 1 5* 1 4*   PHOSPHORUS mg/dL  --  6 0* 5 2* 5 3*   ALK PHOS U/L  --  85 78 88   ALT U/L  --  9* 11* 10*   AST U/L  --  13 14 14

## 2022-05-25 NOTE — ASSESSMENT & PLAN NOTE
· S/p cervical fusion performed by Dr Pelon Gates on 1/69/11, complicated by MRSA infection  · Completed 24 days of IV vancomycin, transitioned to daptomycin on 04/24-4/29    Discharged home on doxycycline 100 mg b i d    · Continue doxycycline  · Will need follow up with Neurosurgery

## 2022-05-25 NOTE — PLAN OF CARE
Problem: PAIN - ADULT  Goal: Verbalizes/displays adequate comfort level or baseline comfort level  Description: Interventions:  - Encourage patient to monitor pain and request assistance  - Assess pain using appropriate pain scale  - Administer analgesics based on type and severity of pain and evaluate response  - Implement non-pharmacological measures as appropriate and evaluate response  - Consider cultural and social influences on pain and pain management  - Notify physician/advanced practitioner if interventions unsuccessful or patient reports new pain  Outcome: Progressing     Problem: INFECTION - ADULT  Goal: Absence or prevention of progression during hospitalization  Description: INTERVENTIONS:  - Assess and monitor for signs and symptoms of infection  - Monitor lab/diagnostic results  - Monitor all insertion sites, i e  indwelling lines, tubes, and drains  - Monitor endotracheal if appropriate and nasal secretions for changes in amount and color  - [place] appropriate cooling/warming therapies per order  - Administer medications as ordered  - Instruct and encourage patient and family to use good hand hygiene technique  - Identify and instruct in appropriate isolation precautions for identified infection/condition  Outcome: Progressing     Problem: SAFETY ADULT  Goal: Patient will remain free of falls  Description: INTERVENTIONS:  - Educate patient/family on patient safety including physical limitations  - Instruct patient to call for assistance with activity   - Consult OT/PT to assist with strengthening/mobility   - Keep Call bell within reach  - Keep bed low and locked with side rails adjusted as appropriate  - Keep care items and personal belongings within reach  - Initiate and maintain comfort rounds  - Make Fall Risk Sign visible to staff  - Offer Toileting every 2 Hours, in advance of need  - Initiate/Maintain alarm  - Obtain necessary fall risk management equipment:   - Apply yellow socks and bracelet for high fall risk patients  - Consider moving patient to room near nurses station  Outcome: Progressing     Problem: Potential for Falls  Goal: Patient will remain free of falls  Description: INTERVENTIONS:  - Educate patient/family on patient safety including physical limitations  - Instruct patient to call for assistance with activity   - Consult OT/PT to assist with strengthening/mobility   - Keep Call bell within reach  - Keep bed low and locked with side rails adjusted as appropriate  - Keep care items and personal belongings within reach  - Initiate and maintain comfort rounds  - Make Fall Risk Sign visible to staff  - Offer Toileting every 2 Hours, in advance of need  - Initiate/Maintain alarm  - Obtain necessary fall risk management equipment:   - Apply yellow socks and bracelet for high fall risk patients  - Consider moving patient to room near nurses station  Outcome: Progressing

## 2022-05-26 ENCOUNTER — APPOINTMENT (INPATIENT)
Dept: CT IMAGING | Facility: HOSPITAL | Age: 72
DRG: 674 | End: 2022-05-26
Attending: INTERNAL MEDICINE
Payer: COMMERCIAL

## 2022-05-26 ENCOUNTER — TELEPHONE (OUTPATIENT)
Dept: NEPHROLOGY | Facility: CLINIC | Age: 72
End: 2022-05-26

## 2022-05-26 PROBLEM — R07.9 CHEST PAIN: Status: ACTIVE | Noted: 2022-05-26

## 2022-05-26 LAB
ANION GAP SERPL CALCULATED.3IONS-SCNC: 9 MMOL/L (ref 4–13)
APTT PPP: 59 SECONDS (ref 23–37)
BASOPHILS # BLD AUTO: 0.03 THOUSANDS/ΜL (ref 0–0.1)
BASOPHILS NFR BLD AUTO: 1 % (ref 0–1)
BUN SERPL-MCNC: 60 MG/DL (ref 5–25)
CALCIUM SERPL-MCNC: 8.2 MG/DL (ref 8.3–10.1)
CARDIAC TROPONIN I PNL SERPL HS: 8 NG/L
CHLORIDE SERPL-SCNC: 102 MMOL/L (ref 100–108)
CO2 SERPL-SCNC: 25 MMOL/L (ref 21–32)
CREAT SERPL-MCNC: 5.6 MG/DL (ref 0.6–1.3)
EOSINOPHIL # BLD AUTO: 0.17 THOUSAND/ΜL (ref 0–0.61)
EOSINOPHIL NFR BLD AUTO: 3 % (ref 0–6)
EOSINOPHIL NFR URNS MANUAL: 1 %
ERYTHROCYTE [DISTWIDTH] IN BLOOD BY AUTOMATED COUNT: 14.3 % (ref 11.6–15.1)
GFR SERPL CREATININE-BSD FRML MDRD: 9 ML/MIN/1.73SQ M
GLUCOSE SERPL-MCNC: 108 MG/DL (ref 65–140)
HCT VFR BLD AUTO: 21.9 % (ref 36.5–49.3)
HGB BLD-MCNC: 7.1 G/DL (ref 12–17)
IMM GRANULOCYTES # BLD AUTO: 0.02 THOUSAND/UL (ref 0–0.2)
IMM GRANULOCYTES NFR BLD AUTO: 0 % (ref 0–2)
LYMPHOCYTES # BLD AUTO: 1.45 THOUSANDS/ΜL (ref 0.6–4.47)
LYMPHOCYTES NFR BLD AUTO: 28 % (ref 14–44)
MCH RBC QN AUTO: 29.3 PG (ref 26.8–34.3)
MCHC RBC AUTO-ENTMCNC: 32.4 G/DL (ref 31.4–37.4)
MCV RBC AUTO: 91 FL (ref 82–98)
MONOCYTES # BLD AUTO: 0.45 THOUSAND/ΜL (ref 0.17–1.22)
MONOCYTES NFR BLD AUTO: 9 % (ref 4–12)
NEUTROPHILS # BLD AUTO: 2.98 THOUSANDS/ΜL (ref 1.85–7.62)
NEUTS SEG NFR BLD AUTO: 59 % (ref 43–75)
NRBC BLD AUTO-RTO: 0 /100 WBCS
PLATELET # BLD AUTO: 161 THOUSANDS/UL (ref 149–390)
PMV BLD AUTO: 10.6 FL (ref 8.9–12.7)
POTASSIUM SERPL-SCNC: 3.5 MMOL/L (ref 3.5–5.3)
RBC # BLD AUTO: 2.42 MILLION/UL (ref 3.88–5.62)
SODIUM SERPL-SCNC: 136 MMOL/L (ref 136–145)
WBC # BLD AUTO: 5.1 THOUSAND/UL (ref 4.31–10.16)

## 2022-05-26 PROCEDURE — 93005 ELECTROCARDIOGRAM TRACING: CPT

## 2022-05-26 PROCEDURE — 84484 ASSAY OF TROPONIN QUANT: CPT | Performed by: PHYSICIAN ASSISTANT

## 2022-05-26 PROCEDURE — 99152 MOD SED SAME PHYS/QHP 5/>YRS: CPT

## 2022-05-26 PROCEDURE — 50200 RENAL BIOPSY PERQ: CPT | Performed by: RADIOLOGY

## 2022-05-26 PROCEDURE — 99152 MOD SED SAME PHYS/QHP 5/>YRS: CPT | Performed by: RADIOLOGY

## 2022-05-26 PROCEDURE — 99232 SBSQ HOSP IP/OBS MODERATE 35: CPT | Performed by: PHYSICIAN ASSISTANT

## 2022-05-26 PROCEDURE — 80048 BASIC METABOLIC PNL TOTAL CA: CPT | Performed by: HOSPITALIST

## 2022-05-26 PROCEDURE — 0TB03ZX EXCISION OF RIGHT KIDNEY, PERCUTANEOUS APPROACH, DIAGNOSTIC: ICD-10-PCS | Performed by: INTERNAL MEDICINE

## 2022-05-26 PROCEDURE — 77012 CT SCAN FOR NEEDLE BIOPSY: CPT

## 2022-05-26 PROCEDURE — 99232 SBSQ HOSP IP/OBS MODERATE 35: CPT | Performed by: INTERNAL MEDICINE

## 2022-05-26 PROCEDURE — 50200 RENAL BIOPSY PERQ: CPT

## 2022-05-26 PROCEDURE — 85730 THROMBOPLASTIN TIME PARTIAL: CPT | Performed by: HOSPITALIST

## 2022-05-26 PROCEDURE — 94760 N-INVAS EAR/PLS OXIMETRY 1: CPT

## 2022-05-26 PROCEDURE — 85025 COMPLETE CBC W/AUTO DIFF WBC: CPT | Performed by: HOSPITALIST

## 2022-05-26 PROCEDURE — 77012 CT SCAN FOR NEEDLE BIOPSY: CPT | Performed by: RADIOLOGY

## 2022-05-26 PROCEDURE — 99153 MOD SED SAME PHYS/QHP EA: CPT

## 2022-05-26 RX ORDER — SODIUM CHLORIDE 9 MG/ML
50 INJECTION, SOLUTION INTRAVENOUS CONTINUOUS
Status: DISCONTINUED | OUTPATIENT
Start: 2022-05-26 | End: 2022-05-27

## 2022-05-26 RX ORDER — WARFARIN SODIUM 5 MG/1
5 TABLET ORAL
Status: COMPLETED | OUTPATIENT
Start: 2022-05-26 | End: 2022-05-26

## 2022-05-26 RX ORDER — MIDAZOLAM HYDROCHLORIDE 2 MG/2ML
INJECTION, SOLUTION INTRAMUSCULAR; INTRAVENOUS CODE/TRAUMA/SEDATION MEDICATION
Status: COMPLETED | OUTPATIENT
Start: 2022-05-26 | End: 2022-05-26

## 2022-05-26 RX ORDER — WARFARIN SODIUM 2.5 MG/1
2.5 TABLET ORAL
Status: DISCONTINUED | OUTPATIENT
Start: 2022-05-27 | End: 2022-05-30

## 2022-05-26 RX ORDER — HEPARIN SODIUM 10000 [USP'U]/100ML
3-30 INJECTION, SOLUTION INTRAVENOUS
Status: DISCONTINUED | OUTPATIENT
Start: 2022-05-26 | End: 2022-06-01

## 2022-05-26 RX ORDER — FENTANYL CITRATE 50 UG/ML
INJECTION, SOLUTION INTRAMUSCULAR; INTRAVENOUS CODE/TRAUMA/SEDATION MEDICATION
Status: COMPLETED | OUTPATIENT
Start: 2022-05-26 | End: 2022-05-26

## 2022-05-26 RX ORDER — WARFARIN SODIUM 5 MG/1
5 TABLET ORAL
Status: DISCONTINUED | OUTPATIENT
Start: 2022-05-28 | End: 2022-05-30

## 2022-05-26 RX ADMIN — DOXYCYCLINE 100 MG: 100 CAPSULE ORAL at 09:47

## 2022-05-26 RX ADMIN — WARFARIN SODIUM 5 MG: 5 TABLET ORAL at 18:21

## 2022-05-26 RX ADMIN — AMLODIPINE BESYLATE 10 MG: 5 TABLET ORAL at 09:47

## 2022-05-26 RX ADMIN — MIDAZOLAM 1 MG: 1 INJECTION INTRAMUSCULAR; INTRAVENOUS at 15:07

## 2022-05-26 RX ADMIN — CALCIUM ACETATE 667 MG: 667 CAPSULE ORAL at 18:21

## 2022-05-26 RX ADMIN — PRAVASTATIN SODIUM 40 MG: 40 TABLET ORAL at 18:21

## 2022-05-26 RX ADMIN — HYDRALAZINE HYDROCHLORIDE 25 MG: 25 TABLET ORAL at 05:55

## 2022-05-26 RX ADMIN — LIDOCAINE 5% 2 PATCH: 700 PATCH TOPICAL at 09:47

## 2022-05-26 RX ADMIN — HYDRALAZINE HYDROCHLORIDE 25 MG: 25 TABLET ORAL at 14:03

## 2022-05-26 RX ADMIN — METHOCARBAMOL TABLETS 750 MG: 500 TABLET, COATED ORAL at 22:45

## 2022-05-26 RX ADMIN — DOCUSATE SODIUM 100 MG: 100 CAPSULE, LIQUID FILLED ORAL at 09:47

## 2022-05-26 RX ADMIN — SODIUM CHLORIDE 50 ML/HR: 0.9 INJECTION, SOLUTION INTRAVENOUS at 15:54

## 2022-05-26 RX ADMIN — SODIUM CHLORIDE 500 ML: 0.9 INJECTION, SOLUTION INTRAVENOUS at 11:14

## 2022-05-26 RX ADMIN — HEPARIN SODIUM 18 UNITS/KG/HR: 10000 INJECTION, SOLUTION INTRAVENOUS at 18:32

## 2022-05-26 RX ADMIN — DULOXETINE 60 MG: 30 CAPSULE, DELAYED RELEASE ORAL at 09:47

## 2022-05-26 RX ADMIN — FENTANYL CITRATE 50 MCG: 50 INJECTION, SOLUTION INTRAMUSCULAR; INTRAVENOUS at 15:25

## 2022-05-26 RX ADMIN — FENTANYL CITRATE 50 MCG: 50 INJECTION, SOLUTION INTRAMUSCULAR; INTRAVENOUS at 15:07

## 2022-05-26 RX ADMIN — HYDRALAZINE HYDROCHLORIDE 25 MG: 25 TABLET ORAL at 22:18

## 2022-05-26 RX ADMIN — GABAPENTIN 100 MG: 100 CAPSULE ORAL at 09:48

## 2022-05-26 RX ADMIN — ACETAMINOPHEN 650 MG: 325 TABLET, FILM COATED ORAL at 18:36

## 2022-05-26 RX ADMIN — PANTOPRAZOLE SODIUM 40 MG: 40 TABLET, DELAYED RELEASE ORAL at 18:21

## 2022-05-26 RX ADMIN — GABAPENTIN 300 MG: 300 CAPSULE ORAL at 22:13

## 2022-05-26 RX ADMIN — DESMOPRESSIN ACETATE 30.8 MCG: 4 SOLUTION INTRAVENOUS at 13:58

## 2022-05-26 RX ADMIN — DOXAZOSIN 1 MG: 1 TABLET ORAL at 22:17

## 2022-05-26 RX ADMIN — CALCIUM ACETATE 667 MG: 667 CAPSULE ORAL at 09:46

## 2022-05-26 RX ADMIN — PANTOPRAZOLE SODIUM 40 MG: 40 TABLET, DELAYED RELEASE ORAL at 05:55

## 2022-05-26 RX ADMIN — TAMSULOSIN HYDROCHLORIDE 0.4 MG: 0.4 CAPSULE ORAL at 18:20

## 2022-05-26 RX ADMIN — DOXYCYCLINE 100 MG: 100 CAPSULE ORAL at 22:13

## 2022-05-26 RX ADMIN — METOPROLOL SUCCINATE 100 MG: 50 TABLET, EXTENDED RELEASE ORAL at 09:47

## 2022-05-26 NOTE — BRIEF OP NOTE (RAD/CATH)
INTERVENTIONAL RADIOLOGY PROCEDURE NOTE    Date: 5/26/2022    Procedure: Right kidney biopsy    Preoperative diagnosis:   1  JANEL (acute kidney injury) ([facility] [address] )    2  Surgical site infection    3  Paroxysmal A-fib ([facility] [address] )    4  Gross hematuria    5  Status post catheter ablation of atrial flutter    6  Status post cervical spinal fusion         Postoperative diagnosis: Same  Surgeon: Luan Leal MD     Assistant: None  No qualified resident was available  Blood loss: 1 mL    Specimens: 4 core needle samples placed into [place] kit  Findings: Successful right renal cortex biopsy  Complications: None immediate      Anesthesia: conscious sedation and local

## 2022-05-26 NOTE — ASSESSMENT & PLAN NOTE
· S/p cervical fusion performed by Dr Annette Caal on 1/65/33, complicated by MRSA infection  · Completed 24 days of IV vancomycin, transitioned to daptomycin on 04/24-4/29    Discharged home on doxycycline 100 mg b i d    · Continue doxycycline  · Will need follow up with Neurosurgery

## 2022-05-26 NOTE — ASSESSMENT & PLAN NOTE
· Patient admitted with acute kidney injury felt to be most likely due to post infectious  glomerulonephritis  · Creatinine still elevated at 5 2  · Was unable have kidney biopsy due to cervical collar not allowing him to lay on his stomach and uncontrolled blood pressure    · Neurosurgery okay with patient being prone as long as he can keep the cervical collar in place  · Nephrology discussed with IR who were agreeable to have patient last on his side for the procedure    · Dr Salome Gibbs recommending cervical collar remain in place for at least additional 2 weeks  · Renal biopsy performed 5/26

## 2022-05-26 NOTE — ASSESSMENT & PLAN NOTE
· Diagnosed during prior admission  · Postinfectious suspected  · PermCath placed 5/23  · Renal biopsy performed 5/26

## 2022-05-26 NOTE — PLAN OF CARE
Problem: INFECTION - ADULT  Goal: Absence or prevention of progression during hospitalization  Description: INTERVENTIONS:  - Assess and monitor for signs and symptoms of infection  - Monitor lab/diagnostic results  - Monitor all insertion sites, i e  indwelling lines, tubes, and drains  - Monitor endotracheal if appropriate and nasal secretions for changes in amount and color  - [place] appropriate cooling/warming therapies per order  - Administer medications as ordered  - Instruct and encourage patient and family to use good hand hygiene technique  - Identify and instruct in appropriate isolation precautions for identified infection/condition  Outcome: Progressing     Problem: DISCHARGE PLANNING  Goal: Discharge to home or other facility with appropriate resources  Description: INTERVENTIONS:  - Identify barriers to discharge w/patient and caregiver  - Arrange for needed discharge resources and transportation as appropriate  - Identify discharge learning needs (meds, wound care, etc )  - Arrange for interpretive services to assist at discharge as needed  - Refer to Case Management Department for coordinating discharge planning if the patient needs post-hospital services based on physician/advanced practitioner order or complex needs related to functional status, cognitive ability, or social support system  Outcome: Progressing     Problem: METABOLIC, FLUID AND ELECTROLYTES - ADULT  Goal: Electrolytes maintained within normal limits  Description: INTERVENTIONS:  - Monitor labs and assess patient for signs and symptoms of electrolyte imbalances  - Administer electrolyte replacement as ordered  - Monitor response to electrolyte replacements, including repeat lab results as appropriate  - Instruct patient on fluid and nutrition as appropriate  Outcome: Progressing  Goal: Fluid balance maintained  Description: INTERVENTIONS:  - Monitor labs   - Monitor I/O and WT  - Instruct patient on fluid and nutrition as appropriate  - Assess for signs & symptoms of volume excess or deficit  Outcome: Progressing     Problem: HEMATOLOGIC - ADULT  Goal: Maintains hematologic stability  Description: INTERVENTIONS  - Assess for signs and symptoms of bleeding or hemorrhage  - Monitor labs  - Administer supportive blood products/factors as ordered and appropriate  Outcome: Progressing

## 2022-05-26 NOTE — ASSESSMENT & PLAN NOTE
· Patient has history of left lower extremity DVT after which he was diagnosed with factor 5 Leiden mutation  This happened years ago    · Hold Coumadin prior to biopsy  · Heparin drip discontinued morning of 5/26 in anticipation of biopsy, transition back to Coumadin

## 2022-05-26 NOTE — PROGRESS NOTES
Pastoral Care Progress Note    2022  Patient: Frandy Hickey : 1950  Admission Date & Time: 5/10/2022 1851  MRN: [id number] CSN: [id number]                     Chaplaincy Interventions Utilized:   Relationship Building: Cultivated a relationship of care and support  Patient was somewhat down today  He said, "I'm not a josh roderick right now " He has a kidney biopsy this afternoon and hopes that it can provide some answers and the right treatment  Patient welcomed a prayer and also asked for prayers for his wife, who is terminally ill  He wants to return home some he can spend quality time with her    Ritual: Provided prayer   22 1100   Clinical Encounter Type   Visited With Patient   Routine Visit Follow-up   Yazdanism Encounters   Yazdanism Needs [facility] Text;Prayer
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
## 2022-05-26 NOTE — PROGRESS NOTES
NEPHROLOGY PROGRESS NOTE   Emmanuel Ramos 71 y o  male MRN: [id number]  Unit/Bed#: -01 Encounter: [id number]  Reason for Consult: JANEL (POA) on CKD    ASSESSMENT/PLAN:  JANEL (POA) on CKD:  Suspected due to post infectious glomerulonephritis with ATN, and incomplete recovery from previous JANEL in April 2022     -baseline creatinine previously 0 6-0 9   -presented with creatinine of 5 04   -creatinine slightly increased today to 5 6   -will give small fluid bolus  NPO since midnight   -follows with Dr Gregg Escobedo, however requesting to be seen in [facility] office  Message sent to office to arrange    -renal ultrasound negative for hydro   -please see previous note from 05/20/2022 to review negative serological workup   -planning on renal biopsy today at 1430  Initially was on hold due to anticoagulation   [place] biopsy form on chart  Verified that lab can utilize [place]  Also spoke with IR and makenzie for biopsy laterally  Cervical collar will need to remain in place  Desmopressin ordered    -no urgent indication to start on dialysis today   Denies uremic symptoms   -strict I/O, good urine output      Access: tunneled dialysis catheter, placed 05/23/2022  Site intact    -we will need to determine the appropriate timing to remove if we do not initiate on HD      Nephrotic range proteinuria: unclear cause, 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    -most recent urine protein to creatinine ratio 12 4 g    -planning on renal biopsy Thursday    -anti HUGH 2 R pending,   -24 hour urine creatinine 0 8, total urine volume 2 L    -24 hour urine protein, 4 6 g      BPH:  Maintained on tamsulosin  Reports difficulty holding urine    -may benefit from urology follow up       Gross hematuria: (resolved)  -coumadin remains on hold  Currently on heparin drip    -will continue to monitor and trend hemoglobin   -serologies negative      Hyperphosphatemia:  Most recent phosphorus level 6 0   -continue on PhosLo   Continue to monitor and trend      Hypertension:  currently acceptable  -currently on amlodipine 10 mg daily, doxazosin 1 mg at bedtime, metoprolol 100 mg daily, and hydralazine 25 mg TID  -avoid hypotension or high fluctuations in blood pressure   -recommend holding parameters antihypertensives for systolic blood pressure less than 130 mmHg       Chronic diastolic heart failure:  With EF of 55% and grade 2 pseudo normal relaxation   -not on outpatient diuretics  -received IV albumin to assist with his anasarca  -will continue to monitor volume status with daily weights, fluid restriction, strict I/O      Factor 5 Leiden mutation:  With history of left lower extremity DVT  -anticoagulated with Coumadin, currently on hold and on heparin drip due to biopsy      Surgical site infection:  With previous cervical fusion on 65/77/2302 complicated by MRSA infection   Currently being treated with doxycycline  -per neurosurgery note, cervical collar will remain in place for at least additional of 2 weeks      Anemia:  -hemoglobin currently 7 1   -continue to monitor and transfuse as needed for hemoglobin less than 7 0      Other:  Atrial fibrillation, hyperlipidemia, depression, WILL on CPAP    Disposition:  Requiring additional stay due to medical needs  SUBJECTIVE:  The patient denies chest pain or shortness of breath  He ambulated in the [place]ag[place] today  He denies nausea, vomiting, diarrhea  He is currently NPO for biopsy later today      OBJECTIVE:  Current Weight: Weight - Scale: 102 kg (223 lb 15 8 oz)  Vitals:    05/25/22 2157 05/26/22 0554 05/26/22 0733 05/26/22 0740   BP: 144/67 138/67 131/65    BP Location:  Left arm     Pulse: 62 60 60    Resp: 19  18    Temp: 97 9 °F (36 6 °C)  97 7 °F (36 5 °C)    TempSrc:       SpO2: 97% 95% 95%    Weight:    102 kg (223 lb 15 8 oz)   Height:           Intake/Output Summary (Last 24 hours) at 5/26/2022 0919  Last data filed at 5/26/2022 0734  Gross per 24 hour   Intake 987 ml   Output 1510 ml Net -523 ml     General: NAD  Skin: warm, dry, intact, no rash  HEENT: Moist mucous membranes, sclera anicteric, normocephalic, atraumatic  Neck: No apparent JVD appreciated, c collar I place   Chest: lung sounds clear B/L, on RA, perm cath   CVS:Regular rate and rhythm, no murmer   Abdomen: Soft, round, non-tender, +BS  Extremities: No B/L LE edema present  Neuro: alert and oriented  Psych: appropriate mood and affect     Medications:    Current Facility-Administered Medications:     acetaminophen (TYLENOL) tablet 650 mg, 650 mg, Oral, Q6H PRN, Thee Gray PA-C, 650 mg at 05/25/22 2202    amLODIPine (NORVASC) tablet 10 mg, 10 mg, Oral, Daily, Shavonne ManeJean Claude, PA-C, 10 mg at 05/25/22 1540    calcium acetate (PHOSLO) capsule 667 mg, 667 mg, Oral, TID With Meals, Cindra Dark, CRNP, 667 mg at 05/25/22 1829    desmopressin (DDAVP) 30 8 mcg in sodium chloride 0 9 % 50 mL IVPB, 0 3 mcg/kg, Intravenous, Once, Ami Schaffer MD    docusate sodium (COLACE) capsule 100 mg, 100 mg, Oral, Daily, Jonelle Lou PA-C, 100 mg at 05/25/22 0915    doxazosin (CARDURA) tablet 1 mg, 1 mg, Oral, HS, Cindra Dark, CRNP, 1 mg at 05/25/22 2202    doxycycline hyclate (VIBRAMYCIN) capsule 100 mg, 100 mg, Oral, Q12H [address], Jonelle Lou PA-C, 100 mg at 05/25/22 2016    DULoxetine (CYMBALTA) delayed release capsule 60 mg, 60 mg, Oral, Daily, Jonelle Lou PA-C, 60 mg at 05/25/22 0914    gabapentin (NEURONTIN) capsule 100 mg, 100 mg, Oral, Daily, Jonelle Lou PA-C, 100 mg at 05/25/22 0915    gabapentin (NEURONTIN) capsule 300 mg, 300 mg, Oral, HS, Jonelle Lou PA-C, 300 mg at 05/25/22 2202    heparin (porcine) injection 4,000 Units, 4,000 Units, Intravenous, Q1H PRN, Mak Farmer DO, 4,000 Units at 05/24/22 2300    heparin (porcine) injection 8,000 Units, 8,000 Units, Intravenous, Q1H PRN, Mak Farmer DO    hydrALAZINE (APRESOLINE) injection 10 mg, 10 mg, Intravenous, Q6H PRN, Jesus Ribeiro DO    hydrALAZINE (APRESOLINE) tablet 25 mg, 25 mg, Oral, Q8H [address], Pheobe Brendan, CRNP, 25 mg at 05/26/22 0555    lidocaine (LIDODERM) 5 % patch 1 patch, 1 patch, Topical, Daily PRN, Annette Flores PA-C, 1 patch at 05/17/22 0032    lidocaine (LIDODERM) 5 % patch 2 patch, 2 patch, Topical, Daily, Mak Farmer DO, 2 patch at 05/25/22 0915    methocarbamol (ROBAXIN) tablet 750 mg, 750 mg, Oral, Q6H PRN, Katie Gonzalez PA-C, 750 mg at 05/24/22 0854    metoprolol succinate (TOPROL-XL) 24 hr tablet 100 mg, 100 mg, Oral, Daily, Jonelle Lou PA-C, 100 mg at 05/25/22 0914    ondansetron (ZOFRAN) injection 4 mg, 4 mg, Intravenous, Q4H PRN, Annette Flores PA-C, 4 mg at 05/24/22 2200    pantoprazole (PROTONIX) EC tablet 40 mg, 40 mg, Oral, BID AC, Jonelle Lou PA-C, 40 mg at 05/26/22 0555    pravastatin (PRAVACHOL) tablet 40 mg, 40 mg, Oral, Daily With Dinner, Katie Gonzalez PA-C, 40 mg at 05/25/22 1829    senna (SENOKOT) tablet 8 6 mg, 1 tablet, Oral, HS PRN, Katie Gonzalez PA-C, 8 6 mg at 05/25/22 0915    tamsulosin (FLOMAX) capsule 0 4 mg, 0 4 mg, Oral, Daily With Neil Caballero PA-C, 0 4 mg at 05/25/22 1829    Laboratory Results:  Results from last 7 days   Lab Units 05/26/22  0350 05/25/22  0455 05/24/22  0432 05/23/22  0453 05/22/22  0608   WBC Thousand/uL 5 10 5 04 5 82 5 97 5 35   HEMOGLOBIN g/dL 7 1* 7 5* 7 4* 7 4* 7 9*   HEMATOCRIT % 21 9* 23 8* 23 4* 23 8* 24 8*   PLATELETS Thousands/uL 161 165 173 180 174   SODIUM mmol/L 136 138 139 139 139   POTASSIUM mmol/L 3 5 3 6 3 5 3 5 4 2   CHLORIDE mmol/L 102 103 103 104 104   CO2 mmol/L 25 26 27 24 25   BUN mg/dL 60* 54* 50* 51* 52*   CREATININE mg/dL 5 60* 5 44* 5 46* 5 26* 5 49*   CALCIUM mg/dL 8 2* 8 5 8 4 8 2* 8 7   MAGNESIUM mg/dL  --   --  1 5* 1 5* 1 4*   PHOSPHORUS mg/dL  --   --  6 0* 5 2* 5 3*   ALK PHOS U/L  --   --  85 78 88   ALT U/L  --   --  9* 11* 10*   AST U/L  --   --  13 14 14

## 2022-05-26 NOTE — ASSESSMENT & PLAN NOTE
Wt Readings from Last 3 Encounters:   05/26/22 102 kg (223 lb 15 8 oz)   05/03/22 114 kg (251 lb)   04/29/22 113 kg (250 lb 1 6 oz)     · Prior echo 04/25/2022:  EF 95%, Diastolic function: grade 2 pseudonormal relaxation  · Monitor I/O and daily weights  · Does not take outpatient diuretics  IV Lasix with minimal results during prior admission    Patient had received albumin to help with anasarca  · Fluid restriction, 2 g sodium

## 2022-05-26 NOTE — CASE MANAGEMENT
Case Management Discharge Planning Note    Patient name [place] Shape  Location /-93 MRN [id number]  : 1950 Date 2022       Current Admission Date: 5/10/2022  Current Admission Diagnosis:JANEL (acute kidney injury) [facility])   Patient Active Problem List    Diagnosis Date Noted    Chronic diastolic (congestive) heart failure ([facility] ) 05/10/2022    Microscopic hematuria 2022    Urinary frequency 2022    Glomerulonephritis     Other proteinuria     Ambulatory dysfunction 2022    JANEL (acute kidney injury) ([facility] ) 2022    Great toe pain, right 04/10/2022    Surgical site infection 2022    Status post cervical spinal fusion 2022    Anemia 2022    Head pain cephalgia 2022    Hyponatremia 2022    Muscle spasm 2022    Goals of care, counseling/discussion 2022    Sinus bradycardia 03/10/2022    Cervical myelopathy ([facility] ) 2022    Pes anserinus bursitis of right knee 2021    IFG (impaired fasting glucose) 2021    History of DVT of lower extremity 2021    Mixed hyperlipidemia 10/27/2020    Paroxysmal A-fib ([facility] ) 2020    Lower extremity edema 2020    Primary osteoarthritis of left knee 2020    Primary osteoarthritis of right knee 2020    Depression, major, single episode, moderate (HCC) 10/30/2017    Insomnia 03/10/2017    Lumbar herniated disc 03/10/2017    Erectile dysfunction 2016    Status post catheter ablation of atrial flutter 2015    Essential hypertension 2015    WILL (obstructive sleep apnea) 2015    Factor V Leiden mutation ([facility] ) 2014      LOS (days): 16  Geometric Mean LOS (GMLOS) (days): 3 10  Days to GMLOS:-12 8     OBJECTIVE:  Risk of Unplanned Readmission Score: 47 15         Current admission status: Inpatient   Preferred Pharmacy:   [address]  [address], [address]  [address] [place] [id number]  Phone: [phone] Fax: [phone]    [id number] [place], [address] - [facility] [address]  Phone: [phone] Fax: [phone]    Primary Care Provider: Tunde Fay MD    Primary Insurance: [address]  Secondary Insurance:     DISCHARGE DETAILS:    Contacts  Patient Contacts: Carlos Muzzy  Relationship to Patient[de-identified] Family  Contact Method: In Person  Phone Number: [phone]  Reason/Outcome: Discharge Planning, Emergency Contact     IMM Given (Date):: 05/26/22 (Copy provided to spouse and media)  IMM Given to[de-identified] Family     Family notified[de-identified] Spouse Erling Moritz  Additional Comments: Met with spouse Erling Moritz at bedside and discussed discharge planning    Patient down in IR for Kidney Bx

## 2022-05-26 NOTE — PLAN OF CARE
Problem: PAIN - ADULT  Goal: Verbalizes/displays adequate comfort level or baseline comfort level  Description: Interventions:  - Encourage patient to monitor pain and request assistance  - Assess pain using appropriate pain scale  - Administer analgesics based on type and severity of pain and evaluate response  - Implement non-pharmacological measures as appropriate and evaluate response  - Consider cultural and social influences on pain and pain management  - Notify physician/advanced practitioner if interventions unsuccessful or patient reports new pain  Outcome: Progressing     Problem: INFECTION - ADULT  Goal: Absence or prevention of progression during hospitalization  Description: INTERVENTIONS:  - Assess and monitor for signs and symptoms of infection  - Monitor lab/diagnostic results  - Monitor all insertion sites, i e  indwelling lines, tubes, and drains  - Monitor endotracheal if appropriate and nasal secretions for changes in amount and color  - Rock appropriate cooling/warming therapies per order  - Administer medications as ordered  - Instruct and encourage patient and family to use good hand hygiene technique  - Identify and instruct in appropriate isolation precautions for identified infection/condition  Outcome: Progressing

## 2022-05-26 NOTE — TELEPHONE ENCOUNTER
----- Message from Rufina De La Cruzjamel Chamberlain sent at 5/24/2022  1:15 PM EDT -----  Hi  The patient has appointment with Dr Linsey Holland on 06/13  Please cancel and schedule with Dr Shoaib Quiroz in [facility]   He is currently in the hospital      Thank you

## 2022-05-26 NOTE — ASSESSMENT & PLAN NOTE
· Following biopsy 5/26  · Pressure in the center chest, denies any similar to 2 prior events  Denies palpitations or shortness of breath, associated nausea or diaphoresis     · Was managed on heparin drip prior to going to biopsy  · Will plan on transitioning back to Coumadin  · EKG shows some lateral depression, will check repeat  · Placed on telemetry  · Follow up on troponin  · Pain improved when patient sat up and drink some ginger ale

## 2022-05-27 ENCOUNTER — APPOINTMENT (INPATIENT)
Dept: CT IMAGING | Facility: HOSPITAL | Age: 72
DRG: 674 | End: 2022-05-27
Payer: COMMERCIAL

## 2022-05-27 PROBLEM — R07.9 CHEST PAIN: Status: RESOLVED | Noted: 2022-05-26 | Resolved: 2022-05-27

## 2022-05-27 LAB
ABO GROUP BLD: NORMAL
ALBUMIN SERPL BCP-MCNC: 1.7 G/DL (ref 3.5–5)
ALP SERPL-CCNC: 68 U/L (ref 46–116)
ALT SERPL W P-5'-P-CCNC: <6 U/L (ref 12–78)
ANION GAP SERPL CALCULATED.3IONS-SCNC: 7 MMOL/L (ref 4–13)
APTT PPP: 101 SECONDS (ref 23–37)
APTT PPP: 108 SECONDS (ref 23–37)
APTT PPP: >210 SECONDS (ref 23–37)
AST SERPL W P-5'-P-CCNC: 21 U/L (ref 5–45)
BASOPHILS # BLD AUTO: 0.03 THOUSANDS/ΜL (ref 0–0.1)
BASOPHILS NFR BLD AUTO: 1 % (ref 0–1)
BILIRUB SERPL-MCNC: 0.4 MG/DL (ref 0.2–1)
BLD GP AB SCN SERPL QL: NEGATIVE
BUN SERPL-MCNC: 60 MG/DL (ref 5–25)
CALCIUM ALBUM COR SERPL-MCNC: 10.2 MG/DL (ref 8.3–10.1)
CALCIUM SERPL-MCNC: 8.4 MG/DL (ref 8.3–10.1)
CHLORIDE SERPL-SCNC: 103 MMOL/L (ref 100–108)
CO2 SERPL-SCNC: 26 MMOL/L (ref 21–32)
CREAT SERPL-MCNC: 5.02 MG/DL (ref 0.6–1.3)
EOSINOPHIL # BLD AUTO: 0.12 THOUSAND/ΜL (ref 0–0.61)
EOSINOPHIL NFR BLD AUTO: 2 % (ref 0–6)
ERYTHROCYTE [DISTWIDTH] IN BLOOD BY AUTOMATED COUNT: 14.3 % (ref 11.6–15.1)
GFR SERPL CREATININE-BSD FRML MDRD: 10 ML/MIN/1.73SQ M
GLUCOSE SERPL-MCNC: 113 MG/DL (ref 65–140)
HCT VFR BLD AUTO: 19.4 % (ref 36.5–49.3)
HCT VFR BLD AUTO: 20.2 % (ref 36.5–49.3)
HCT VFR BLD AUTO: 24.2 % (ref 36.5–49.3)
HCT VFR BLD AUTO: 24.5 % (ref 36.5–49.3)
HGB BLD-MCNC: 6.4 G/DL (ref 12–17)
HGB BLD-MCNC: 6.5 G/DL (ref 12–17)
HGB BLD-MCNC: 7.7 G/DL (ref 12–17)
HGB BLD-MCNC: 7.9 G/DL (ref 12–17)
IMM GRANULOCYTES # BLD AUTO: 0.02 THOUSAND/UL (ref 0–0.2)
IMM GRANULOCYTES NFR BLD AUTO: 0 % (ref 0–2)
INR PPP: 1.21 (ref 0.84–1.19)
LYMPHOCYTES # BLD AUTO: 1.15 THOUSANDS/ΜL (ref 0.6–4.47)
LYMPHOCYTES NFR BLD AUTO: 22 % (ref 14–44)
MCH RBC QN AUTO: 28.7 PG (ref 26.8–34.3)
MCHC RBC AUTO-ENTMCNC: 31.7 G/DL (ref 31.4–37.4)
MCV RBC AUTO: 91 FL (ref 82–98)
MONOCYTES # BLD AUTO: 0.49 THOUSAND/ΜL (ref 0.17–1.22)
MONOCYTES NFR BLD AUTO: 9 % (ref 4–12)
NEUTROPHILS # BLD AUTO: 3.4 THOUSANDS/ΜL (ref 1.85–7.62)
NEUTS SEG NFR BLD AUTO: 66 % (ref 43–75)
NRBC BLD AUTO-RTO: 0 /100 WBCS
PLATELET # BLD AUTO: 136 THOUSANDS/UL (ref 149–390)
PMV BLD AUTO: 10.7 FL (ref 8.9–12.7)
POTASSIUM SERPL-SCNC: 3.6 MMOL/L (ref 3.5–5.3)
PROT SERPL-MCNC: 5.2 G/DL (ref 6.4–8.2)
PROTHROMBIN TIME: 15.1 SECONDS (ref 11.6–14.5)
RBC # BLD AUTO: 2.23 MILLION/UL (ref 3.88–5.62)
RH BLD: POSITIVE
SODIUM SERPL-SCNC: 136 MMOL/L (ref 136–145)
SPECIMEN EXPIRATION DATE: NORMAL
WBC # BLD AUTO: 5.21 THOUSAND/UL (ref 4.31–10.16)

## 2022-05-27 PROCEDURE — 85014 HEMATOCRIT: CPT | Performed by: PHYSICIAN ASSISTANT

## 2022-05-27 PROCEDURE — G1004 CDSM NDSC: HCPCS

## 2022-05-27 PROCEDURE — 86901 BLOOD TYPING SEROLOGIC RH(D): CPT | Performed by: PHYSICIAN ASSISTANT

## 2022-05-27 PROCEDURE — 85610 PROTHROMBIN TIME: CPT | Performed by: HOSPITALIST

## 2022-05-27 PROCEDURE — 86900 BLOOD TYPING SEROLOGIC ABO: CPT | Performed by: PHYSICIAN ASSISTANT

## 2022-05-27 PROCEDURE — P9016 RBC LEUKOCYTES REDUCED: HCPCS

## 2022-05-27 PROCEDURE — 74176 CT ABD & PELVIS W/O CONTRAST: CPT

## 2022-05-27 PROCEDURE — 85018 HEMOGLOBIN: CPT | Performed by: PHYSICIAN ASSISTANT

## 2022-05-27 PROCEDURE — 85730 THROMBOPLASTIN TIME PARTIAL: CPT | Performed by: HOSPITALIST

## 2022-05-27 PROCEDURE — 85025 COMPLETE CBC W/AUTO DIFF WBC: CPT | Performed by: HOSPITALIST

## 2022-05-27 PROCEDURE — 85014 HEMATOCRIT: CPT | Performed by: HOSPITALIST

## 2022-05-27 PROCEDURE — 86850 RBC ANTIBODY SCREEN: CPT | Performed by: PHYSICIAN ASSISTANT

## 2022-05-27 PROCEDURE — 99232 SBSQ HOSP IP/OBS MODERATE 35: CPT | Performed by: INTERNAL MEDICINE

## 2022-05-27 PROCEDURE — 85018 HEMOGLOBIN: CPT | Performed by: HOSPITALIST

## 2022-05-27 PROCEDURE — 99232 SBSQ HOSP IP/OBS MODERATE 35: CPT | Performed by: PHYSICIAN ASSISTANT

## 2022-05-27 PROCEDURE — 86920 COMPATIBILITY TEST SPIN: CPT

## 2022-05-27 PROCEDURE — 30233N1 TRANSFUSION OF NONAUTOLOGOUS RED BLOOD CELLS INTO PERIPHERAL VEIN, PERCUTANEOUS APPROACH: ICD-10-PCS | Performed by: INTERNAL MEDICINE

## 2022-05-27 PROCEDURE — 85730 THROMBOPLASTIN TIME PARTIAL: CPT | Performed by: PHYSICIAN ASSISTANT

## 2022-05-27 PROCEDURE — 80053 COMPREHEN METABOLIC PANEL: CPT | Performed by: HOSPITALIST

## 2022-05-27 RX ORDER — AMLODIPINE BESYLATE 5 MG/1
10 TABLET ORAL DAILY
Status: DISCONTINUED | OUTPATIENT
Start: 2022-05-28 | End: 2022-06-04 | Stop reason: HOSPADM

## 2022-05-27 RX ORDER — AMLODIPINE BESYLATE 5 MG/1
5 TABLET ORAL DAILY
Status: DISCONTINUED | OUTPATIENT
Start: 2022-05-28 | End: 2022-05-27

## 2022-05-27 RX ORDER — SEVELAMER HYDROCHLORIDE 800 MG/1
800 TABLET, FILM COATED ORAL
Status: DISCONTINUED | OUTPATIENT
Start: 2022-05-27 | End: 2022-06-04 | Stop reason: HOSPADM

## 2022-05-27 RX ADMIN — DULOXETINE 60 MG: 30 CAPSULE, DELAYED RELEASE ORAL at 08:46

## 2022-05-27 RX ADMIN — CALCIUM ACETATE 667 MG: 667 CAPSULE ORAL at 08:46

## 2022-05-27 RX ADMIN — PANTOPRAZOLE SODIUM 40 MG: 40 TABLET, DELAYED RELEASE ORAL at 07:28

## 2022-05-27 RX ADMIN — DOXYCYCLINE 100 MG: 100 CAPSULE ORAL at 08:46

## 2022-05-27 RX ADMIN — PRAVASTATIN SODIUM 40 MG: 40 TABLET ORAL at 17:19

## 2022-05-27 RX ADMIN — HEPARIN SODIUM 16 UNITS/KG/HR: 10000 INJECTION, SOLUTION INTRAVENOUS at 10:48

## 2022-05-27 RX ADMIN — DOCUSATE SODIUM 100 MG: 100 CAPSULE, LIQUID FILLED ORAL at 08:46

## 2022-05-27 RX ADMIN — CALCIUM ACETATE 667 MG: 667 CAPSULE ORAL at 10:49

## 2022-05-27 RX ADMIN — SEVELAMER HYDROCHLORIDE 800 MG: 800 TABLET, FILM COATED PARENTERAL at 17:19

## 2022-05-27 RX ADMIN — PANTOPRAZOLE SODIUM 40 MG: 40 TABLET, DELAYED RELEASE ORAL at 17:19

## 2022-05-27 RX ADMIN — TAMSULOSIN HYDROCHLORIDE 0.4 MG: 0.4 CAPSULE ORAL at 17:19

## 2022-05-27 RX ADMIN — AMLODIPINE BESYLATE 10 MG: 5 TABLET ORAL at 08:46

## 2022-05-27 RX ADMIN — WARFARIN SODIUM 2.5 MG: 2.5 TABLET ORAL at 17:19

## 2022-05-27 RX ADMIN — METOPROLOL SUCCINATE 100 MG: 50 TABLET, EXTENDED RELEASE ORAL at 08:46

## 2022-05-27 RX ADMIN — GABAPENTIN 100 MG: 100 CAPSULE ORAL at 08:46

## 2022-05-27 RX ADMIN — DOXAZOSIN 1 MG: 1 TABLET ORAL at 21:38

## 2022-05-27 RX ADMIN — DOXYCYCLINE 100 MG: 100 CAPSULE ORAL at 21:38

## 2022-05-27 RX ADMIN — GABAPENTIN 300 MG: 300 CAPSULE ORAL at 21:38

## 2022-05-27 RX ADMIN — ACETAMINOPHEN 650 MG: 325 TABLET, FILM COATED ORAL at 07:28

## 2022-05-27 RX ADMIN — ONDANSETRON 4 MG: 2 INJECTION INTRAMUSCULAR; INTRAVENOUS at 10:45

## 2022-05-27 NOTE — ASSESSMENT & PLAN NOTE
· Patient admitted with acute kidney injury felt to be most likely due to post infectious  glomerulonephritis  · Was unable have kidney biopsy due to cervical collar not allowing him to lay on his stomach and uncontrolled blood pressure  · Neurosurgery okay with patient being prone as long as he can keep the cervical collar in place  · Nephrology discussed with IR who were agreeable to have patient last on his side for the procedure    · Dr Vladimir Campuzano recommending cervical collar remain in place for at least additional 2 weeks  · Renal biopsy performed 5/26  · Creatinine stable at 5 02, no urgent need for dialysis at this time    Nephrology following

## 2022-05-27 NOTE — ASSESSMENT & PLAN NOTE
· Patient has PAF  He denies palpitations    · Patient maintained sinus rhythm  · Continue Toprol  · INR subtherapeutic 1 21  · Continue IV heparin gtt to Coumadin bridge

## 2022-05-27 NOTE — ASSESSMENT & PLAN NOTE
· Following biopsy 5/26  · Pressure in the center chest, denies any similar to 2 prior events  Denies palpitations or shortness of breath, associated nausea or diaphoresis     · Was managed on heparin drip prior to going to biopsy  · Will plan on transitioning back to Coumadin  · EKG shows some lateral depression, will check repeat  · Placed on telemetry  · Pain improved when patient sat up and drink some ginger ale  · Resolved

## 2022-05-27 NOTE — ASSESSMENT & PLAN NOTE
· Patient has chronic anemia  He denies signs of GI  bleeding      · Did have decreased to 6 5 overnight following renal biopsy  · CT abdomen/pelvis without evidence of hematoma  · S/p 1 unit PRBCs with improvement to 7 7  · Trend CBC

## 2022-05-27 NOTE — PROGRESS NOTES
[place]     Progress Note - Neville Clipper 1950, 71 y o  male MRN: [id number]  Unit/Bed#: MS Sushila[facility]Yaya Encounter: [id number]  Primary Care Provider: Kandice Macias MD   Date and time admitted to hospital: 5/10/2022  6:51 PM    * JANEL (acute kidney injury) [facility])  Assessment & Plan  · Patient admitted with acute kidney injury felt to be most likely due to post infectious  glomerulonephritis  · Was unable have kidney biopsy due to cervical collar not allowing him to lay on his stomach and uncontrolled blood pressure  · Neurosurgery okay with patient being prone as long as he can keep the cervical collar in place  · Nephrology discussed with IR who were agreeable to have patient last on his side for the procedure    · Dr Fito Robison recommending cervical collar remain in place for at least additional 2 weeks  · Renal biopsy performed 5/26  · Creatinine stable at 5 02, no urgent need for dialysis at this time  Nephrology following    Chronic diastolic (congestive) heart failure (HCC)  Assessment & Plan  Wt Readings from Last 3 Encounters:   05/27/22 103 kg (226 lb 9 6 oz)   05/03/22 114 kg (251 lb)   04/29/22 113 kg (250 lb 1 6 oz)     · Prior echo 04/25/2022:  EF 79%, Diastolic function: grade 2 pseudonormal relaxation  · Monitor I/O and daily weights  · Does not take outpatient diuretics  IV Lasix with minimal results during prior admission  Patient had received albumin to help with anasarca  · Fluid restriction, 2 g sodium    Glomerulonephritis  Assessment & Plan  · Diagnosed during prior admission  · Postinfectious suspected  · PermCath placed 5/23  · Renal biopsy performed 5/26    Surgical site infection  Assessment & Plan  · S/p cervical fusion performed by Dr Fito Robison on 9/29/34, complicated by MRSA infection  · Completed 24 days of IV vancomycin, transitioned to daptomycin on 04/24-4/29    Discharged home on doxycycline 100 mg b i d    · Continue doxycycline  · Will need follow up with Neurosurgery      Anemia  Assessment & Plan  · Patient has chronic anemia  He denies signs of GI  bleeding  · Did have decreased to 6 5 overnight following renal biopsy  · CT abdomen/pelvis without evidence of hematoma  · S/p 1 unit PRBCs with improvement to 7 7  · Trend CBC    Mixed hyperlipidemia  Assessment & Plan  · Continue statin    Paroxysmal A-fib (HCC)  Assessment & Plan  · Patient has PAF  He denies palpitations  · Patient maintained sinus rhythm  · Continue Toprol  · INR subtherapeutic 1 21  · Continue IV heparin gtt to Coumadin bridge    Depression, major, single episode, moderate (HCC)  Assessment & Plan  · Continue Cymbalta    Factor V Leiden mutation [facility])  Assessment & Plan  · Patient has history of left lower extremity DVT after which he was diagnosed with factor 5 Leiden mutation  This happened years ago  · Hold Coumadin prior to biopsy  · Continues on heparin drip to Coumadin bridge  · Goal INR 2-3  · Daily INR - as of this morning 1 21    WILL (obstructive sleep apnea)  Assessment & Plan  · CPAP HS - patient brought home machine in    Essential hypertension  Assessment & Plan  · Home regimen:  Amlodipine 5 mg daily and metoprolol succinate 100 mg daily  · Amlodipine had been increased to 10 mg daily during this admission  · Consider reducing back to 5 mg daily if persistent hypotension despite discontinuation of hydralazine  · Hydralazine discontinued per Nephrology given low blood pressures    Chest pain-resolved as of 5/27/2022  Assessment & Plan  · Following biopsy 5/26  · Pressure in the center chest, denies any similar to 2 prior events  Denies palpitations or shortness of breath, associated nausea or diaphoresis     · Was managed on heparin drip prior to going to biopsy  · Will plan on transitioning back to Coumadin  · EKG shows some lateral depression, will check repeat  · Placed on telemetry  · Pain improved when patient sat up and drink some ginger ale  · Resolved    VTE Pharmacologic Prophylaxis: VTE Score: 3 Moderate Risk (Score 3-4) - Pharmacological DVT Prophylaxis Ordered: heparin drip  Patient Centered Rounds: I performed bedside rounds with nursing staff today  Discussions with Specialists or Other Care Team Provider: PEBBLES, appreciate nephrology input    Education and Discussions with Family / Patient: Updated  (wife) via phone  Time Spent for Care: 30 minutes  More than 50% of total time spent on counseling and coordination of care as described above  Current Length of Stay: 17 day(s)  Current Patient Status: Inpatient   Certification Statement: The patient will continue to require additional inpatient hospital stay due to Heparin gtt to Coumadin  Discharge Plan: Anticipate discharge in 48-72 hrs to home  Code Status: Level 3 - DNAR and DNI    Subjective:   Patient reports poor sleep overnight due to needing CT scan, blood transfusion  Denies any further chest pain/palpitations, shortness of breath, nausea vomiting, abdominal pain  Objective:     Vitals:   Temp (24hrs), Av 7 °F (36 5 °C), Min:97 4 °F (36 3 °C), Max:98 °F (36 7 °C)    Temp:  [97 4 °F (36 3 °C)-98 °F (36 7 °C)] 97 9 °F (36 6 °C)  HR:  [61-72] 61  Resp:  [8-20] 18  BP: ()/(42-74) 132/60  SpO2:  [90 %-100 %] 95 %  Body mass index is 31 6 kg/m²  Input and Output Summary (last 24 hours): Intake/Output Summary (Last 24 hours) at 2022 1257  Last data filed at 2022 1244  Gross per 24 hour   Intake 1304 58 ml   Output 1560 ml   Net -255 42 ml       Physical Exam:   Physical Exam  Vitals and nursing note reviewed  Constitutional:       Appearance: He is well-developed  Comments: Appears comfortable, no acute distress   HENT:      Head: Normocephalic and atraumatic  Eyes:      General: No scleral icterus  Extraocular Movements: Extraocular movements intact        Conjunctiva/sclera: Conjunctivae normal    Neck:      Comments: Cervical collar in place  Cardiovascular:      Rate and Rhythm: Normal rate and regular rhythm  Heart sounds: No murmur heard  Pulmonary:      Effort: Pulmonary effort is normal  No respiratory distress  Breath sounds: Normal breath sounds  No wheezing, rhonchi or rales  Abdominal:      General: Bowel sounds are normal       Palpations: Abdomen is soft  Tenderness: There is no abdominal tenderness  There is no guarding or rebound  Musculoskeletal:      Comments: Able to move upper/lower extremities bilaterally, no edema   Skin:     General: Skin is warm and dry  Neurological:      Mental Status: He is alert and oriented to person, place, and time  Psychiatric:         Mood and Affect: Mood normal          Speech: Speech normal          Behavior: Behavior normal           Additional Data:     Labs:  Results from last 7 days   Lab Units 05/27/22  1015 05/27/22  0219 05/27/22  0049   WBC Thousand/uL  --   --  5 21   HEMOGLOBIN g/dL 7 7*   < > 6 4*   HEMATOCRIT % 24 2*   < > 20 2*   PLATELETS Thousands/uL  --   --  136*   NEUTROS PCT %  --   --  66   LYMPHS PCT %  --   --  22   MONOS PCT %  --   --  9   EOS PCT %  --   --  2    < > = values in this interval not displayed  Results from last 7 days   Lab Units 05/27/22  0049   SODIUM mmol/L 136   POTASSIUM mmol/L 3 6   CHLORIDE mmol/L 103   CO2 mmol/L 26   BUN mg/dL 60*   CREATININE mg/dL 5 02*   ANION GAP mmol/L 7   CALCIUM mg/dL 8 4   ALBUMIN g/dL 1 7*   TOTAL BILIRUBIN mg/dL 0 40   ALK PHOS U/L 68   ALT U/L <6*   AST U/L 21   GLUCOSE RANDOM mg/dL 113     Results from last 7 days   Lab Units 05/27/22  0049   INR  1 21*                   Lines/Drains:  Invasive Devices  Report    Peripheral Intravenous Line  Duration           Peripheral IV 05/26/22 Dorsal (posterior); Left Forearm <1 day    Peripheral IV 05/27/22 Dorsal (posterior); Left Wrist <1 day          Hemodialysis Catheter  Duration           HD Permanent Double Catheter 4 days Telemetry:  Telemetry Orders (From admission, onward)             48 Hour Telemetry Monitoring  Continuous x 48 hours        References:    Telemetry Guidelines   Question:  Reason for 48 Hour Telemetry  Answer:  Acute MI, chest pain - R/O MI, or unstable angina                 Telemetry Reviewed: Normal Sinus Rhythm  Indication for Continued Telemetry Use: No indication for continued use  Will discontinue                Imaging: Reviewed radiology reports from this admission including: abdominal/pelvic CT    Recent Cultures (last 7 days):         Last 24 Hours Medication List:   Current Facility-Administered Medications   Medication Dose Route Frequency Provider Last Rate    acetaminophen  650 mg Oral Q6H PRN Melchor Jenkins PA-C      [START ON 5/28/2022] amLODIPine  10 mg Oral Daily Lizzeth Taras Alcazar PA-C      calcium acetate  667 mg Oral TID With Meals [facility], DEEPTHI      docusate sodium  100 mg Oral Daily Melchor Jenkins PA-C      doxazosin  1 mg Oral HS Vinny BrazilDEEPTHI      doxycycline hyclate  100 mg Oral Q12H [facility] Melchor Jenkins PA-C      DULoxetine  60 mg Oral Daily Melchor Jenkins PA-C      gabapentin  100 mg Oral Daily Melchor Jenkins PA-C      gabapentin  300 mg Oral HS Melchor Jenkins PA-C      heparin (porcine)  3-30 Units/kg/hr (Order-Specific) Intravenous Titrated Marj GUZMAN PA-C Stopped (05/27/22 1218)    heparin (porcine)  4,000 Units Intravenous Q1H PRN Pandi Todhe, DO      heparin (porcine)  8,000 Units Intravenous Q1H PRN Pandi Todhe, DO      hydrALAZINE  10 mg Intravenous Q6H PRN Sims Dakin, DO      lidocaine  1 patch Topical Daily PRN Annette Flores PA-C      lidocaine  2 patch Topical Daily Judsoni Todhe, DO      methocarbamol  750 mg Oral Q6H PRN Melchor Jenkins PA-C      metoprolol succinate  100 mg Oral Daily Melchor Jenkins PA-C      ondansetron  4 mg Intravenous Q4H PRN Annetet Flores PA-C      pantoprazole  40 mg Oral BID YOBANI Lou PA-C      pravastatin  40 mg Oral Daily With Dinner Juan Daniel Castro PA-C      senna  1 tablet Oral HS PRN Juan Daniel Castro PA-C      tamsulosin  0 4 mg Oral Daily With Dinner Juan Daniel Castro PA-C      warfarin  2 5 mg Oral Once per day on Sun Mon Tue Thu Fri Robbieie Zuleima GUZMAN PA-C      [START ON 5/28/2022] warfarin  5 mg Oral Once per day on Wed Sat Patrice Trinidad PA-C          Today, Patient Was Seen By: Franky Marie PA-C    **Please Note: This note may have been constructed using a voice recognition system  **

## 2022-05-27 NOTE — PLAN OF CARE
Problem: PAIN - ADULT  Goal: Verbalizes/displays adequate comfort level or baseline comfort level  Description: Interventions:  - Encourage patient to monitor pain and request assistance  - Assess pain using appropriate pain scale  - Administer analgesics based on type and severity of pain and evaluate response  - Implement non-pharmacological measures as appropriate and evaluate response  - Consider cultural and social influences on pain and pain management  - Notify physician/advanced practitioner if interventions unsuccessful or patient reports new pain  Outcome: Progressing     Problem: INFECTION - ADULT  Goal: Absence or prevention of progression during hospitalization  Description: INTERVENTIONS:  - Assess and monitor for signs and symptoms of infection  - Monitor lab/diagnostic results  - Monitor all insertion sites, i e  indwelling lines, tubes, and drains  - Monitor endotracheal if appropriate and nasal secretions for changes in amount and color  - [place] appropriate cooling/warming therapies per order  - Administer medications as ordered  - Instruct and encourage patient and family to use good hand hygiene technique  - Identify and instruct in appropriate isolation precautions for identified infection/condition  Outcome: Progressing     Problem: DISCHARGE PLANNING  Goal: Discharge to home or other facility with appropriate resources  Description: INTERVENTIONS:  - Identify barriers to discharge w/patient and caregiver  - Arrange for needed discharge resources and transportation as appropriate  - Identify discharge learning needs (meds, wound care, etc )  - Arrange for interpretive services to assist at discharge as needed  - Refer to Case Management Department for coordinating discharge planning if the patient needs post-hospital services based on physician/advanced practitioner order or complex needs related to functional status, cognitive ability, or social support system  Outcome: Progressing Problem: Knowledge Deficit  Goal: Patient/family/caregiver demonstrates understanding of disease process, treatment plan, medications, and discharge instructions  Description: Complete learning assessment and assess knowledge base  Interventions:  - Provide teaching at level of understanding  - Provide teaching via preferred learning methods  Outcome: Progressing     Problem: SAFETY ADULT  Goal: Maintain or return to baseline ADL function  Description: INTERVENTIONS:  -  Assess patient's ability to carry out ADLs; assess patient's baseline for ADL function and identify physical deficits which impact ability to perform ADLs (bathing, care of mouth/teeth, toileting, grooming, dressing, etc )  - Assess/evaluate cause of self-care deficits   - Assess range of motion  - Assess patient's mobility; develop plan if impaired  - Assess patient's need for assistive devices and provide as appropriate  - Encourage maximum independence but intervene and supervise when necessary  - Involve family in performance of ADLs  - Assess for home care needs following discharge   - Consider OT consult to assist with ADL evaluation and planning for discharge  - Provide patient education as appropriate  Outcome: Progressing  Goal: Maintains/Returns to pre admission functional level  Description: INTERVENTIONS:  - Perform BMAT or MOVE assessment daily    - Set and communicate daily mobility goal to care team and patient/family/caregiver  - Collaborate with rehabilitation services on mobility goals if consulted  - Perform Range of Motion 5 times a day  - Reposition patient every 3 hours    - Dangle patient 3 times a day  - Stand patient 3 times a day  - Ambulate patient 3 times a day  - Out of bed to chair 3 times a day   - Out of bed for meals 3 times a day  - Out of bed for toileting  - Record patient progress and toleration of activity level   Outcome: Progressing  Goal: Patient will remain free of falls  Description: INTERVENTIONS:  - Educate patient/family on patient safety including physical limitations  - Instruct patient to call for assistance with activity   - Consult OT/PT to assist with strengthening/mobility   - Keep Call bell within reach  - Keep bed low and locked with side rails adjusted as appropriate  - Keep care items and personal belongings within reach  - Initiate and maintain comfort rounds  - Make Fall Risk Sign visible to staff  - Offer Toileting every 2 Hours, in advance of need  - Initiate/Maintain bed alarm  - Obtain necessary fall risk management equipment: non slip socks  - Apply yellow socks and bracelet for high fall risk patients  - Consider moving patient to room near nurses station  Outcome: Progressing     Problem: MOBILITY - ADULT  Goal: Maintain or return to baseline ADL function  Description: INTERVENTIONS:  -  Assess patient's ability to carry out ADLs; assess patient's baseline for ADL function and identify physical deficits which impact ability to perform ADLs (bathing, care of mouth/teeth, toileting, grooming, dressing, etc )  - Assess/evaluate cause of self-care deficits   - Assess range of motion  - Assess patient's mobility; develop plan if impaired  - Assess patient's need for assistive devices and provide as appropriate  - Encourage maximum independence but intervene and supervise when necessary  - Involve family in performance of ADLs  - Assess for home care needs following discharge   - Consider OT consult to assist with ADL evaluation and planning for discharge  - Provide patient education as appropriate  Outcome: Progressing  Goal: Maintains/Returns to pre admission functional level  Description: INTERVENTIONS:  - Perform BMAT or MOVE assessment daily    - Set and communicate daily mobility goal to care team and patient/family/caregiver  - Collaborate with rehabilitation services on mobility goals if consulted  - Perform Range of Motion 5 times a day  - Reposition patient every 3 hours    - Dangle patient 3 times a day  - Stand patient 3 times a day  - Ambulate patient 3 times a day  - Out of bed to chair 3 times a day   - Out of bed for meals 3 times a day  - Out of bed for toileting  - Record patient progress and toleration of activity level   Outcome: Progressing     Problem: Prexisting or High Potential for Compromised Skin Integrity  Goal: Skin integrity is maintained or improved  Description: INTERVENTIONS:  - Identify patients at risk for skin breakdown  - Assess and monitor skin integrity  - Assess and monitor nutrition and hydration status  - Monitor labs   - Assess for incontinence   - Turn and reposition patient  - Assist with mobility/ambulation  - Relieve pressure over bony prominences  - Avoid friction and shearing  - Provide appropriate hygiene as needed including keeping skin clean and dry  - Evaluate need for skin moisturizer/barrier cream  - Collaborate with interdisciplinary team   - Patient/family teaching  - Consider wound care consult   Outcome: Progressing     Problem: Nutrition/Hydration-ADULT  Goal: Nutrient/Hydration intake appropriate for improving, restoring or maintaining nutritional needs  Description: Monitor and assess patient's nutrition/hydration status for malnutrition  Collaborate with interdisciplinary team and initiate plan and interventions as ordered  Monitor patient's weight and dietary intake as ordered or per policy  Utilize nutrition screening tool and intervene as necessary  Determine patient's food preferences and provide high-protein, high-caloric foods as appropriate       INTERVENTIONS:  - Monitor oral intake, urinary output, labs, and treatment plans  - Assess nutrition and hydration status and recommend course of action  - Evaluate amount of meals eaten  - Assist patient with eating if necessary   - Allow adequate time for meals  - Recommend/ encourage appropriate diets, oral nutritional supplements, and vitamin/mineral supplements  - Order, calculate, and assess calorie counts as needed  - Recommend, monitor, and adjust tube feedings and TPN/PPN based on assessed needs  - Assess need for intravenous fluids  - Provide specific nutrition/hydration education as appropriate  - Include patient/family/caregiver in decisions related to nutrition  Outcome: Progressing     Problem: METABOLIC, FLUID AND ELECTROLYTES - ADULT  Goal: Electrolytes maintained within normal limits  Description: INTERVENTIONS:  - Monitor labs and assess patient for signs and symptoms of electrolyte imbalances  - Administer electrolyte replacement as ordered  - Monitor response to electrolyte replacements, including repeat lab results as appropriate  - Instruct patient on fluid and nutrition as appropriate  Outcome: Progressing  Goal: Fluid balance maintained  Description: INTERVENTIONS:  - Monitor labs   - Monitor I/O and WT  - Instruct patient on fluid and nutrition as appropriate  - Assess for signs & symptoms of volume excess or deficit  Outcome: Progressing     Problem: SKIN/TISSUE INTEGRITY - ADULT  Goal: Skin Integrity remains intact(Skin Breakdown Prevention)  Description: Assess:  -Perform Matt assessment every shift  -Clean and moisturize skin every day  -Inspect skin when repositioning, toileting, and assisting with ADLS  -Assess under medical devices such as bracelets every shift  -Assess extremities for adequate circulation and sensation     Bed Management:  -Have minimal linens on bed & keep smooth, unwrinkled  -Change linens as needed when moist or perspiring  -Avoid sitting or lying in one position for more than 2 hours while in bed  -Keep HOB at 30 degrees     Toileting:  -Offer bedside commode  -Assess for incontinence every 2 hours  -Use incontinent care products after each incontinent episode such as barrier cream    Activity:  -Mobilize patient 3 times a day  -Encourage activity and walks on unit  -Encourage or provide ROM exercises   -Turn and reposition patient every 2 Hours  -Use appropriate equipment to lift or move patient in bed  -Instruct/ Assist with weight shifting every 3 hours when out of bed in chair  -Consider limitation of chair time 3 hour intervals    Skin Care:  -Avoid use of baby powder, tape, friction and shearing, hot water or constrictive clothing  -Relieve pressure over bony prominences using allevyn  -Do not massage red bony areas    Next Steps:  -Teach patient strategies to minimize risks such as shifting weight   -Consider consults to  interdisciplinary teams such as wound care  Outcome: Progressing  Goal: Incision(s), wounds(s) or drain site(s) healing without S/S of infection  Description: INTERVENTIONS  - Assess and document dressing, incision, wound bed, drain sites and surrounding tissue  - Provide patient and family education  - Perform skin care/dressing changes every day  Outcome: Progressing     Problem: HEMATOLOGIC - ADULT  Goal: Maintains hematologic stability  Description: INTERVENTIONS  - Assess for signs and symptoms of bleeding or hemorrhage  - Monitor labs  - Administer supportive blood products/factors as ordered and appropriate  Outcome: Progressing     Problem: MUSCULOSKELETAL - ADULT  Goal: Maintain or return mobility to safest level of function  Description: INTERVENTIONS:  - Assess patient's ability to carry out ADLs; assess patient's baseline for ADL function and identify physical deficits which impact ability to perform ADLs (bathing, care of mouth/teeth, toileting, grooming, dressing, etc )  - Assess/evaluate cause of self-care deficits   - Assess range of motion  - Assess patient's mobility  - Assess patient's need for assistive devices and provide as appropriate  - Encourage maximum independence but intervene and supervise when necessary  - Involve family in performance of ADLs  - Assess for home care needs following discharge   - Consider OT consult to assist with ADL evaluation and planning for discharge  - Provide patient education as appropriate  Outcome: Progressing

## 2022-05-27 NOTE — ASSESSMENT & PLAN NOTE
Wt Readings from Last 3 Encounters:   05/27/22 103 kg (226 lb 9 6 oz)   05/03/22 114 kg (251 lb)   04/29/22 113 kg (250 lb 1 6 oz)     · Prior echo 04/25/2022:  EF 59%, Diastolic function: grade 2 pseudonormal relaxation  · Monitor I/O and daily weights  · Does not take outpatient diuretics  IV Lasix with minimal results during prior admission    Patient had received albumin to help with anasarca  · Fluid restriction, 2 g sodium

## 2022-05-27 NOTE — PLAN OF CARE
Problem: PAIN - ADULT  Goal: Verbalizes/displays adequate comfort level or baseline comfort level  Description: Interventions:  - Encourage patient to monitor pain and request assistance  - Assess pain using appropriate pain scale  - Administer analgesics based on type and severity of pain and evaluate response  - Implement non-pharmacological measures as appropriate and evaluate response  - Consider cultural and social influences on pain and pain management  - Notify physician/advanced practitioner if interventions unsuccessful or patient reports new pain  Outcome: Progressing     Problem: INFECTION - ADULT  Goal: Absence or prevention of progression during hospitalization  Description: INTERVENTIONS:  - Assess and monitor for signs and symptoms of infection  - Monitor lab/diagnostic results  - Monitor all insertion sites, i e  indwelling lines, tubes, and drains  - Monitor endotracheal if appropriate and nasal secretions for changes in amount and color  - [place] appropriate cooling/warming therapies per order  - Administer medications as ordered  - Instruct and encourage patient and family to use good hand hygiene technique  - Identify and instruct in appropriate isolation precautions for identified infection/condition  Outcome: Progressing     Problem: SAFETY ADULT  Goal: Patient will remain free of falls  Description: INTERVENTIONS:  - Educate patient/family on patient safety including physical limitations  - Instruct patient to call for assistance with activity   - Consult OT/PT to assist with strengthening/mobility   - Keep Call bell within reach  - Keep bed low and locked with side rails adjusted as appropriate  - Keep care items and personal belongings within reach  - Initiate and maintain comfort rounds  - Make Fall Risk Sign visible to staff  - Offer Toileting every 2 Hours, in advance of need  - Initiate/Maintain alarm  - Obtain necessary fall risk management equipment:   - Apply yellow socks and bracelet for high fall risk patients  - Consider moving patient to room near nurses station  Outcome: Progressing

## 2022-05-27 NOTE — PROGRESS NOTES
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, and incomplete recovery from previous JANEL in April 2022     -baseline creatinine previously 0 6-0 9   -presented with creatinine of 5 04   -creatinine improved to 5 02   -received fluid bolus yesterday due to NPO status   -unfortunately with dropping hemoglobin today status post renal biopsy 05/26   -follows with Dr Brandon Bills, however requesting to be seen in [facility] office  Message sent to office to arrange    -renal ultrasound negative for hydro   -please see previous note from 05/20/2022 to review negative serological workup   -no urgent indication to start on dialysis today   Denies uremic symptoms   -strict I/O, good urine output  1 7 L yesterday/24 hours      Access: tunneled dialysis catheter, placed 05/23/2022  Site intact    -we will need to determine the appropriate timing to remove if we do not initiate on HD closer to discharge      Nephrotic range proteinuria: unclear cause, 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    -most recent urine protein to creatinine ratio 12 4 g    -status post renal biopsy     -anti HUGH 2 R pending,   -24 hour urine creatinine 0 8, total urine volume 2 L  -24 hour urine protein, 4 6 g      BPH:  Maintained on tamsulosin  Reports difficulty holding urine    -may benefit from urology follow up       Gross hematuria: (resolved)  -coumadin remains on hold  Currently on heparin drip with bridge to coumadin    -will continue to monitor and trend hemoglobin   -serologies negative      Hyperphosphatemia:  Most recent phosphorus level 6 0   -continue on PhosLo with meals  Continue to monitor and trend      Hypertension:  currently acceptable   Previous episodes of hypotension    -currently on amlodipine 10 mg daily, doxazosin 1 mg at bedtime, metoprolol 100 mg daily, and hydralazine 25 mg PO TID  -will discontinue hydrazine for now   -avoid hypotension or high fluctuations in blood pressure   -recommend holding parameters antihypertensives for systolic blood pressure less than 130 mmHg       Chronic diastolic heart failure:  With EF of 55% and grade 2 pseudo normal relaxation   -not on outpatient diuretics  -received IV albumin to assist with his anasarca  -will continue to monitor volume status with daily weights, fluid restriction, strict I/O      Factor 5 Leiden mutation:  With history of left lower extremity DVT  -currently on heparin drips with bridge to Coumadin      Surgical site infection:  With previous cervical fusion on 01/21/9896 complicated by MRSA infection   Currently being treated with doxycycline  -per neurosurgery note, cervical collar will remain in place for at least additional of 2 weeks      Anemia:  -hemoglobin currently trending downward  Status post 1 unit of PRBCs with repeat hemoglobin is 7 7  Status post renal biopsy  Concern for hematoma  CT negative for evidence  -continue to monitor and transfuse as needed for hemoglobin less than 7 0      Other:  Atrial fibrillation, hyperlipidemia, depression, WILL on CPAP    Disposition:  Requiring additional stay due to medical needs  SUBJECTIVE:  The patient is resting in his bed  He appears be comfortable  He denies any chest discomfort or shortness of breath  He denies nausea, vomiting, diarrhea  He reports urinating okay  He reports having an normal appetite      OBJECTIVE:  Current Weight: Weight - Scale: 103 kg (226 lb 9 6 oz)  Vitals:    05/27/22 0627 05/27/22 0728 05/27/22 0847 05/27/22 0907   BP: 119/65 118/62 132/60    BP Location: Left arm      Pulse: 65 63 61    Resp: 20  18    Temp: (!) 97 4 °F (36 3 °C) 98 °F (36 7 °C) 97 9 °F (36 6 °C)    TempSrc: Oral      SpO2: 94% 95%     Weight:    103 kg (226 lb 9 6 oz)   Height:           Intake/Output Summary (Last 24 hours) at 5/27/2022 1045  Last data filed at 5/27/2022 9889  Gross per 24 hour   Intake 1804 58 ml   Output 1560 ml   Net 244 58 ml     General: NAD  Skin: warm, dry, intact, no rash  HEENT: Moist mucous membranes, sclera anicteric, normocephalic, atraumatic  Neck: No apparent JVD appreciated, cervical collar  Chest: lung sounds clear B/L, on RA   CVS:Regular rate and rhythm, no murmer   Abdomen: Soft, round, non-tender, +BS  Extremities: No B/L LE edema present  Neuro: alert and oriented  Psych: appropriate mood and affect     Medications:    Current Facility-Administered Medications:     acetaminophen (TYLENOL) tablet 650 mg, 650 mg, Oral, Q6H PRN, Jonelle Lou PA-C, 650 mg at 05/27/22 0728    amLODIPine (NORVASC) tablet 10 mg, 10 mg, Oral, Daily, Shavonne Silverio PA-C, 10 mg at 05/27/22 0846    calcium acetate (PHOSLO) capsule 667 mg, 667 mg, Oral, TID With Meals, Corita Margarette, CRNP, 667 mg at 05/27/22 0846    docusate sodium (COLACE) capsule 100 mg, 100 mg, Oral, Daily, Jonelle Lou PA-C, 100 mg at 05/27/22 0846    doxazosin (CARDURA) tablet 1 mg, 1 mg, Oral, HS, Corita Margarette, CRNP, 1 mg at 05/26/22 2217    doxycycline hyclate (VIBRAMYCIN) capsule 100 mg, 100 mg, Oral, Q12H [address], Jonelle Lou PA-C, 100 mg at 05/27/22 0846    DULoxetine (CYMBALTA) delayed release capsule 60 mg, 60 mg, Oral, Daily, Jonelle Lou PA-C, 60 mg at 05/27/22 0846    gabapentin (NEURONTIN) capsule 100 mg, 100 mg, Oral, Daily, Jonelle Lou PA-C, 100 mg at 05/27/22 0846    gabapentin (NEURONTIN) capsule 300 mg, 300 mg, Oral, HS, Jonelle Lou PA-C, 300 mg at 05/26/22 2213    heparin (porcine) 25,000 units in 0 45% NaCl 250 mL infusion (premix), 3-30 Units/kg/hr (Order-Specific), Intravenous, Titrated, Marj GUZMAN PA-C, Paused at 05/27/22 0350    heparin (porcine) injection 4,000 Units, 4,000 Units, Intravenous, Q1H PRN, Mak Farmer DO, 4,000 Units at 05/24/22 2300    heparin (porcine) injection 8,000 Units, 8,000 Units, Intravenous, Q1H PRN, Mak Farmer DO    hydrALAZINE (APRESOLINE) injection 10 mg, 10 mg, Intravenous, Q6H PRN, Baylee Willoughby DO    hydrALAZINE (APRESOLINE) tablet 25 mg, 25 mg, Oral, Q8H VARGAS, DEEPTHI Aden, 25 mg at 05/26/22 2218    lidocaine (LIDODERM) 5 % patch 1 patch, 1 patch, Topical, Daily PRN, Annette Flores PA-C, 1 patch at 05/17/22 0032    lidocaine (LIDODERM) 5 % patch 2 patch, 2 patch, Topical, Daily, Mak Farmer DO, 2 patch at 05/26/22 0947    methocarbamol (ROBAXIN) tablet 750 mg, 750 mg, Oral, Q6H PRN, Ezell Dandy, PA-C, 750 mg at 05/26/22 2245    metoprolol succinate (TOPROL-XL) 24 hr tablet 100 mg, 100 mg, Oral, Daily, Jonelle Lou PA-C, 100 mg at 05/27/22 0846    ondansetron (ZOFRAN) injection 4 mg, 4 mg, Intravenous, Q4H PRN, Annette Flores PA-C, 4 mg at 05/24/22 2200    pantoprazole (PROTONIX) EC tablet 40 mg, 40 mg, Oral, BID AC, Jonelle Lou PA-C, 40 mg at 05/27/22 2150    pravastatin (PRAVACHOL) tablet 40 mg, 40 mg, Oral, Daily With Dinner, Ezell Dandy, PA-C, 40 mg at 05/26/22 1821    senna (SENOKOT) tablet 8 6 mg, 1 tablet, Oral, HS PRN, Ezell Dandy, PA-C, 8 6 mg at 05/25/22 0915    tamsulosin (FLOMAX) capsule 0 4 mg, 0 4 mg, Oral, Daily With Dinner, Ezell Dandy, PA-C, 0 4 mg at 05/26/22 1820    warfarin (COUMADIN) tablet 2 5 mg, 2 5 mg, Oral, Once per day on Sun Mon Tue Jenniferu Fri, Marj GUZMAN PA-C    [START ON 5/28/2022] warfarin (COUMADIN) tablet 5 mg, 5 mg, Oral, Once per day on Wed Sat, Marj Orozco [place], [place]    Laboratory Results:  Results from last 7 days   Lab Units 05/27/22  0219 05/27/22  0049 05/26/22  0350 05/25/22  0455 05/24/22  0432 05/23/22  0453 05/22/22  0608   WBC Thousand/uL  --  5 21 5 10 5 04 5 82 5 97 5 35   HEMOGLOBIN g/dL 6 5* 6 4* 7 1* 7 5* 7 4* 7 4* 7 9*   HEMATOCRIT % 19 4* 20 2* 21 9* 23 8* 23 4* 23 8* 24 8*   PLATELETS Thousands/uL  --  136* 161 165 173 180 174   SODIUM mmol/L  --  136 136 138 139 139 139   POTASSIUM mmol/L  --  3 6 3 5 3 6 3 5 3 5 4  2   CHLORIDE mmol/L  --  103 102 103 103 104 104   CO2 mmol/L  --  26 25 26 27 24 25   BUN mg/dL  --  60* 60* 54* 50* 51* 52*   CREATININE mg/dL  --  5 02* 5 60* 5 44* 5 46* 5 26* 5 49*   CALCIUM mg/dL  --  8 4 8 2* 8 5 8 4 8 2* 8 7   MAGNESIUM mg/dL  --   --   --   --  1 5* 1 5* 1 4*   PHOSPHORUS mg/dL  --   --   --   --  6 0* 5 2* 5 3*   ALK PHOS U/L  --  68  --   --  85 78 88   ALT U/L  --  <6*  --   --  9* 11* 10*   AST U/L  --  21  --   --  13 14 14

## 2022-05-27 NOTE — ASSESSMENT & PLAN NOTE
· Home regimen:  Amlodipine 5 mg daily and metoprolol succinate 100 mg daily  · Amlodipine had been increased to 10 mg daily during this admission  · Consider reducing back to 5 mg daily if persistent hypotension despite discontinuation of hydralazine  · Hydralazine discontinued per Nephrology given low blood pressures

## 2022-05-27 NOTE — QUICK NOTE
Notified of Hgb at 6 4 this morning, confirmed with recheck  Pt seen and examined at bedside as he had renal biopsy performed yesterday (5/26) afternoon  He denies any acute complaints  On exam, there no flank ecchymosis or tenderness  No scrotal ecchymosis  (RN chaperone present in room)  STAT CT A/P without contrast due to renal function does not show any retroperitoneal or abdominopelvic hematoma  Will give 1U PRBCs (pt gave written consent) and will monitor Hgb response  Continue heparin for now  Pt updated about CT findings

## 2022-05-27 NOTE — ASSESSMENT & PLAN NOTE
· Patient has history of left lower extremity DVT after which he was diagnosed with factor 5 Leiden mutation  This happened years ago    · Hold Coumadin prior to biopsy  · Continues on heparin drip to Coumadin bridge  · Goal INR 2-3  · Daily INR - as of this morning 1 21

## 2022-05-27 NOTE — ASSESSMENT & PLAN NOTE
· S/p cervical fusion performed by Dr Fito Robison on 4/80/33, complicated by MRSA infection  · Completed 24 days of IV vancomycin, transitioned to daptomycin on 04/24-4/29    Discharged home on doxycycline 100 mg b i d    · Continue doxycycline  · Will need follow up with Neurosurgery

## 2022-05-28 LAB
ABO GROUP BLD BPU: NORMAL
ANION GAP SERPL CALCULATED.3IONS-SCNC: 12 MMOL/L (ref 4–13)
APTT PPP: 85 SECONDS (ref 23–37)
APTT PPP: 86 SECONDS (ref 23–37)
BASOPHILS # BLD AUTO: 0.04 THOUSANDS/ΜL (ref 0–0.1)
BASOPHILS NFR BLD AUTO: 1 % (ref 0–1)
BPU ID: NORMAL
BUN SERPL-MCNC: 58 MG/DL (ref 5–25)
CALCIUM SERPL-MCNC: 8.4 MG/DL (ref 8.3–10.1)
CHLORIDE SERPL-SCNC: 101 MMOL/L (ref 100–108)
CO2 SERPL-SCNC: 22 MMOL/L (ref 21–32)
CREAT SERPL-MCNC: 4.72 MG/DL (ref 0.6–1.3)
CROSSMATCH: NORMAL
EOSINOPHIL # BLD AUTO: 0.16 THOUSAND/ΜL (ref 0–0.61)
EOSINOPHIL NFR BLD AUTO: 3 % (ref 0–6)
ERYTHROCYTE [DISTWIDTH] IN BLOOD BY AUTOMATED COUNT: 14.6 % (ref 11.6–15.1)
GFR SERPL CREATININE-BSD FRML MDRD: 11 ML/MIN/1.73SQ M
GLUCOSE SERPL-MCNC: 91 MG/DL (ref 65–140)
HCT VFR BLD AUTO: 23 % (ref 36.5–49.3)
HCT VFR BLD AUTO: 25.4 % (ref 36.5–49.3)
HGB BLD-MCNC: 7.1 G/DL (ref 12–17)
HGB BLD-MCNC: 8.2 G/DL (ref 12–17)
IMM GRANULOCYTES # BLD AUTO: 0.01 THOUSAND/UL (ref 0–0.2)
IMM GRANULOCYTES NFR BLD AUTO: 0 % (ref 0–2)
INR PPP: 1.31 (ref 0.84–1.19)
LYMPHOCYTES # BLD AUTO: 1.61 THOUSANDS/ΜL (ref 0.6–4.47)
LYMPHOCYTES NFR BLD AUTO: 30 % (ref 14–44)
MCH RBC QN AUTO: 28.5 PG (ref 26.8–34.3)
MCHC RBC AUTO-ENTMCNC: 30.9 G/DL (ref 31.4–37.4)
MCV RBC AUTO: 92 FL (ref 82–98)
MONOCYTES # BLD AUTO: 0.57 THOUSAND/ΜL (ref 0.17–1.22)
MONOCYTES NFR BLD AUTO: 11 % (ref 4–12)
NEUTROPHILS # BLD AUTO: 2.94 THOUSANDS/ΜL (ref 1.85–7.62)
NEUTS SEG NFR BLD AUTO: 55 % (ref 43–75)
NRBC BLD AUTO-RTO: 0 /100 WBCS
PLATELET # BLD AUTO: 131 THOUSANDS/UL (ref 149–390)
PMV BLD AUTO: 11.4 FL (ref 8.9–12.7)
POTASSIUM SERPL-SCNC: 3.3 MMOL/L (ref 3.5–5.3)
PROTHROMBIN TIME: 16.1 SECONDS (ref 11.6–14.5)
RBC # BLD AUTO: 2.49 MILLION/UL (ref 3.88–5.62)
SODIUM SERPL-SCNC: 135 MMOL/L (ref 136–145)
UNIT DISPENSE STATUS: NORMAL
UNIT PRODUCT CODE: NORMAL
UNIT PRODUCT VOLUME: 350 ML
UNIT RH: NORMAL
WBC # BLD AUTO: 5.33 THOUSAND/UL (ref 4.31–10.16)

## 2022-05-28 PROCEDURE — 85730 THROMBOPLASTIN TIME PARTIAL: CPT | Performed by: HOSPITALIST

## 2022-05-28 PROCEDURE — 99232 SBSQ HOSP IP/OBS MODERATE 35: CPT | Performed by: INTERNAL MEDICINE

## 2022-05-28 PROCEDURE — 99232 SBSQ HOSP IP/OBS MODERATE 35: CPT | Performed by: PHYSICIAN ASSISTANT

## 2022-05-28 PROCEDURE — 85014 HEMATOCRIT: CPT | Performed by: PHYSICIAN ASSISTANT

## 2022-05-28 PROCEDURE — 85025 COMPLETE CBC W/AUTO DIFF WBC: CPT | Performed by: HOSPITALIST

## 2022-05-28 PROCEDURE — 94760 N-INVAS EAR/PLS OXIMETRY 1: CPT

## 2022-05-28 PROCEDURE — 80048 BASIC METABOLIC PNL TOTAL CA: CPT | Performed by: HOSPITALIST

## 2022-05-28 PROCEDURE — 85610 PROTHROMBIN TIME: CPT | Performed by: HOSPITALIST

## 2022-05-28 PROCEDURE — 85018 HEMOGLOBIN: CPT | Performed by: PHYSICIAN ASSISTANT

## 2022-05-28 RX ORDER — POTASSIUM CHLORIDE 20 MEQ/1
20 TABLET, EXTENDED RELEASE ORAL ONCE
Status: COMPLETED | OUTPATIENT
Start: 2022-05-28 | End: 2022-05-28

## 2022-05-28 RX ADMIN — SEVELAMER HYDROCHLORIDE 800 MG: 800 TABLET, FILM COATED PARENTERAL at 08:44

## 2022-05-28 RX ADMIN — SEVELAMER HYDROCHLORIDE 800 MG: 800 TABLET, FILM COATED PARENTERAL at 12:22

## 2022-05-28 RX ADMIN — TAMSULOSIN HYDROCHLORIDE 0.4 MG: 0.4 CAPSULE ORAL at 17:29

## 2022-05-28 RX ADMIN — DOXYCYCLINE 100 MG: 100 CAPSULE ORAL at 08:44

## 2022-05-28 RX ADMIN — GABAPENTIN 100 MG: 100 CAPSULE ORAL at 08:44

## 2022-05-28 RX ADMIN — ACETAMINOPHEN 650 MG: 325 TABLET, FILM COATED ORAL at 21:33

## 2022-05-28 RX ADMIN — METHOCARBAMOL TABLETS 750 MG: 500 TABLET, COATED ORAL at 21:33

## 2022-05-28 RX ADMIN — PRAVASTATIN SODIUM 40 MG: 40 TABLET ORAL at 17:30

## 2022-05-28 RX ADMIN — ACETAMINOPHEN 650 MG: 325 TABLET, FILM COATED ORAL at 08:43

## 2022-05-28 RX ADMIN — DULOXETINE 60 MG: 30 CAPSULE, DELAYED RELEASE ORAL at 08:44

## 2022-05-28 RX ADMIN — SEVELAMER HYDROCHLORIDE 800 MG: 800 TABLET, FILM COATED PARENTERAL at 17:29

## 2022-05-28 RX ADMIN — DOCUSATE SODIUM 100 MG: 100 CAPSULE, LIQUID FILLED ORAL at 08:44

## 2022-05-28 RX ADMIN — DOXYCYCLINE 100 MG: 100 CAPSULE ORAL at 21:33

## 2022-05-28 RX ADMIN — METOPROLOL SUCCINATE 100 MG: 50 TABLET, EXTENDED RELEASE ORAL at 08:44

## 2022-05-28 RX ADMIN — POTASSIUM CHLORIDE 20 MEQ: 1500 TABLET, EXTENDED RELEASE ORAL at 08:44

## 2022-05-28 RX ADMIN — PANTOPRAZOLE SODIUM 40 MG: 40 TABLET, DELAYED RELEASE ORAL at 05:54

## 2022-05-28 RX ADMIN — WARFARIN SODIUM 5 MG: 5 TABLET ORAL at 17:29

## 2022-05-28 RX ADMIN — HEPARIN SODIUM 11 UNITS/KG/HR: 10000 INJECTION, SOLUTION INTRAVENOUS at 10:57

## 2022-05-28 RX ADMIN — DOXAZOSIN 1 MG: 1 TABLET ORAL at 21:32

## 2022-05-28 RX ADMIN — GABAPENTIN 300 MG: 300 CAPSULE ORAL at 21:32

## 2022-05-28 RX ADMIN — PANTOPRAZOLE SODIUM 40 MG: 40 TABLET, DELAYED RELEASE ORAL at 17:29

## 2022-05-28 NOTE — PROGRESS NOTES
[place]     Progress Note - Tawana Altafmarcell 1950, 71 y o  male MRN: [id number]  Unit/Bed#: -Yaya Encounter: [id number]  Primary Care Provider: Cynthia Portillo MD   Date and time admitted to hospital: 5/10/2022  6:51 PM    * JANEL (acute kidney injury) [facility])  Assessment & Plan  · Patient admitted with acute kidney injury felt to be most likely due to post infectious  glomerulonephritis  · Was unable have kidney biopsy due to cervical collar not allowing him to lay on his stomach and uncontrolled blood pressure  · Neurosurgery okay with patient being prone as long as he can keep the cervical collar in place  · Nephrology discussed with IR who were agreeable to have patient last on his side for the procedure    · Dr Pablito Ballard recommending cervical collar remain in place for at least additional 2 weeks  · Renal biopsy performed 5/26  · Creatinine improving to 4 72, no urgent need for dialysis at this time  Nephrology following    Chronic diastolic (congestive) heart failure (HCC)  Assessment & Plan  Wt Readings from Last 3 Encounters:   05/28/22 103 kg (226 lb 9 6 oz)   05/03/22 114 kg (251 lb)   04/29/22 113 kg (250 lb 1 6 oz)     · Prior echo 04/25/2022:  EF 84%, Diastolic function: grade 2 pseudonormal relaxation  · Monitor I/O and daily weights  · Does not take outpatient diuretics  IV Lasix with minimal results during prior admission  Patient had received albumin to help with anasarca  · Fluid restriction, 2 g sodium    Glomerulonephritis  Assessment & Plan  · Diagnosed during prior admission  · Postinfectious suspected  · PermCath placed 5/23  · Renal biopsy performed 5/26    Surgical site infection  Assessment & Plan  · S/p cervical fusion performed by Dr Pablito Ballard on 1/23/11, complicated by MRSA infection  · Completed 24 days of IV vancomycin, transitioned to daptomycin on 04/24-4/29    Discharged home on doxycycline 100 mg b i d    · Continue doxycycline  · Will need follow up with Neurosurgery      Anemia  Assessment & Plan  · Patient has chronic anemia  He denies signs of GI  bleeding  · Did have decreased to 6 5 overnight following renal biopsy  · CT abdomen/pelvis without evidence of hematoma  · S/p 1 unit PRBCs 5/26  · Hemoglobin 7 1 - will recheck later today  Patient denies evidence of active bleeding  · Suspect renal function also contributing to persistent anemia  · Check stool occult/folate/vitamin b12/iron panel  · Trend CBC    Mixed hyperlipidemia  Assessment & Plan  · Continue statin    Paroxysmal A-fib (HCC)  Assessment & Plan  · Patient has PAF  He denies palpitations  · Patient maintained sinus rhythm  · Continue Toprol  · INR subtherapeutic 1 31  · Continue IV heparin gtt to Coumadin bridge    Depression, major, single episode, moderate (HCC)  Assessment & Plan  · Continue Cymbalta    Factor V Leiden mutation [facility])  Assessment & Plan  · Patient has history of left lower extremity DVT after which he was diagnosed with factor 5 Leiden mutation  This happened years ago  · Hold Coumadin prior to biopsy  · Continues on heparin drip to Coumadin bridge  · Goal INR 2-3  · Daily INR - as of this morning 1 31    WILL (obstructive sleep apnea)  Assessment & Plan  · CPAP HS - patient brought home machine in    Essential hypertension  Assessment & Plan  · Home regimen:  Amlodipine 5 mg daily and metoprolol succinate 100 mg daily  · Amlodipine had been increased to 10 mg daily during this admission  · Consider reducing back to 5 mg daily if persistent hypotension despite discontinuation of hydralazine  · Hydralazine discontinued per Nephrology given low blood pressures    VTE Pharmacologic Prophylaxis: VTE Score: 3 Moderate Risk (Score 3-4) - Pharmacological DVT Prophylaxis Ordered: heparin drip  Patient Centered Rounds: I performed bedside rounds with nursing staff today    Discussions with Specialists or Other Care Team Provider:     Education and Discussions with Family / Patient: Updated  (wife) via phone  Time Spent for Care: 30 minutes  More than 50% of total time spent on counseling and coordination of care as described above  Current Length of Stay: 18 day(s)  Current Patient Status: Inpatient   Certification Statement: The patient will continue to require additional inpatient hospital stay due to Heparin gtt to coumadin bridge, ongoing nephrology recs  Discharge Plan: Anticipate discharge in 48-72 hrs to home  Code Status: Level 3 - DNAR and DNI    Subjective:   Patient denies any complaints  Overall feels better after having improved sleep since yesterday  Denies chest pain/palpitations, shortness of breath, nausea vomiting, abdominal pain  Objective:     Vitals:   Temp (24hrs), Av 6 °F (36 4 °C), Min:97 5 °F (36 4 °C), Max:97 8 °F (36 6 °C)    Temp:  [97 5 °F (36 4 °C)-97 8 °F (36 6 °C)] 97 8 °F (36 6 °C)  HR:  [56-58] 58  Resp:  [19] 19  BP: (120-136)/(50-99) 120/51  SpO2:  [94 %-99 %] 94 %  Body mass index is 31 6 kg/m²  Input and Output Summary (last 24 hours): Intake/Output Summary (Last 24 hours) at 2022 1052  Last data filed at 2022 0801  Gross per 24 hour   Intake 236 ml   Output 1075 ml   Net -839 ml       Physical Exam:   Physical Exam  Nursing note reviewed  Constitutional:       Appearance: He is well-developed  Comments: Comfortable, no acute distress   HENT:      Head: Normocephalic and atraumatic  Eyes:      General: No scleral icterus  Extraocular Movements: Extraocular movements intact  Conjunctiva/sclera: Conjunctivae normal    Neck:      Comments: Cervical collar in place  Cardiovascular:      Rate and Rhythm: Normal rate and regular rhythm  Heart sounds: S1 normal and S2 normal  No murmur heard  Pulmonary:      Effort: Pulmonary effort is normal  No respiratory distress  Breath sounds: Normal breath sounds  No wheezing, rhonchi or rales     Abdominal:      General: Bowel sounds are normal       Palpations: Abdomen is soft  Tenderness: There is no abdominal tenderness  There is no guarding or rebound  Comments: Right flank assessed at bx site - no ecchymosis  Nontender   Musculoskeletal:      Comments: Able to move upper/lower extremities bilaterally, no edema   Skin:     General: Skin is warm and dry  Neurological:      Mental Status: He is alert and oriented to person, place, and time  Psychiatric:         Mood and Affect: Mood normal          Speech: Speech normal          Behavior: Behavior normal           Additional Data:     Labs:  Results from last 7 days   Lab Units 05/28/22  0313   WBC Thousand/uL 5 33   HEMOGLOBIN g/dL 7 1*   HEMATOCRIT % 23 0*   PLATELETS Thousands/uL 131*   NEUTROS PCT % 55   LYMPHS PCT % 30   MONOS PCT % 11   EOS PCT % 3     Results from last 7 days   Lab Units 05/28/22  0313 05/27/22  0049   SODIUM mmol/L 135* 136   POTASSIUM mmol/L 3 3* 3 6   CHLORIDE mmol/L 101 103   CO2 mmol/L 22 26   BUN mg/dL 58* 60*   CREATININE mg/dL 4 72* 5 02*   ANION GAP mmol/L 12 7   CALCIUM mg/dL 8 4 8 4   ALBUMIN g/dL  --  1 7*   TOTAL BILIRUBIN mg/dL  --  0 40   ALK PHOS U/L  --  68   ALT U/L  --  <6*   AST U/L  --  21   GLUCOSE RANDOM mg/dL 91 113     Results from last 7 days   Lab Units 05/28/22  0313   INR  1 31*                   Lines/Drains:  Invasive Devices  Report    Peripheral Intravenous Line  Duration           Peripheral IV 05/26/22 Dorsal (posterior); Left Forearm 1 day    Peripheral IV 05/27/22 Dorsal (posterior); Left Wrist 1 day          Hemodialysis Catheter  Duration           HD Permanent Double Catheter 5 days                  Telemetry:  Telemetry Orders (From admission, onward)             48 Hour Telemetry Monitoring  Continuous x 48 hours        Expiring   References:    Telemetry Guidelines   Question:  Reason for 48 Hour Telemetry  Answer:  Acute MI, chest pain - R/O MI, or unstable angina                 Telemetry Reviewed: Normal Sinus Rhythm  Indication for Continued Telemetry Use: No indication for continued use  Will discontinue                Imaging: Reviewed radiology reports from this admission including: abdominal/pelvic CT    Recent Cultures (last 7 days):         Last 24 Hours Medication List:   Current Facility-Administered Medications   Medication Dose Route Frequency Provider Last Rate    acetaminophen  650 mg Oral Q6H PRN Ben Area, PA-C      amLODIPine  10 mg Oral Daily Irish Em, PA-C      docusate sodium  100 mg Oral Daily Ben Area, PA-C      doxazosin  1 mg Oral HS Tonye Plater, CRNP      doxycycline hyclate  100 mg Oral Q12H [address] Ben Area, PA-C      DULoxetine  60 mg Oral Daily Ben Area, PA-C      gabapentin  100 mg Oral Daily Ben Area, PA-C      gabapentin  300 mg Oral HS Ben Area, PA-C      heparin (porcine)  3-30 Units/kg/hr (Order-Specific) Intravenous Titrated Hieu Kristel V, PA-C 11 Units/kg/hr (05/27/22 2038)    heparin (porcine)  4,000 Units Intravenous Q1H PRN Judsoni Todhe, DO      heparin (porcine)  8,000 Units Intravenous Q1H PRN Pandi Todhe, DO      hydrALAZINE  10 mg Intravenous Q6H PRN Abel Schulz, DO      lidocaine  1 patch Topical Daily PRN Annette Diasio, PA-C      lidocaine  2 patch Topical Daily Pandi Todhe, DO      methocarbamol  750 mg Oral Q6H PRN Ben Area, PA-C      metoprolol succinate  100 mg Oral Daily Ben Area, PA-C      ondansetron  4 mg Intravenous Q4H PRN Annette Dianicolasa, PA-C      pantoprazole  40 mg Oral BID AC Jonelle Lou PA-C      pravastatin  40 mg Oral Daily With VEENA Nesbitt-ALTAGRACIA      senna  1 tablet Oral HS PRN Ben Area, PA-C      sevelamer  800 mg Oral TID With Meals Cheryl Layne MD      tamsulosin  0 4 mg Oral Daily With Dinner Ben Area, PA-ALTAGRACIA      warfarin  2 5 mg Oral Once per day on Sun Mon Tue Thu Fri Hieu Kristel V, PA-C      warfarin  5 mg Oral Once per day on Wed Sat Coco Herbert PA-C          Today, Patient Was Seen By: Nahomi Andrea PA-C    **Please Note: This note may have been constructed using a voice recognition system  **

## 2022-05-28 NOTE — ASSESSMENT & PLAN NOTE
· Patient has history of left lower extremity DVT after which he was diagnosed with factor 5 Leiden mutation  This happened years ago    · Hold Coumadin prior to biopsy  · Continues on heparin drip to Coumadin bridge  · Goal INR 2-3  · Daily INR - as of this morning 1 31

## 2022-05-28 NOTE — ASSESSMENT & PLAN NOTE
· Patient has chronic anemia  He denies signs of GI  bleeding  · Did have decreased to 6 5 overnight following renal biopsy  · CT abdomen/pelvis without evidence of hematoma  · S/p 1 unit PRBCs 5/26  · Hemoglobin 7 1 - will recheck later today    Patient denies evidence of active bleeding  · Suspect renal function also contributing to persistent anemia  · Check stool occult/folate/vitamin b12/iron panel  · Trend CBC

## 2022-05-28 NOTE — ASSESSMENT & PLAN NOTE
· Patient admitted with acute kidney injury felt to be most likely due to post infectious  glomerulonephritis  · Was unable have kidney biopsy due to cervical collar not allowing him to lay on his stomach and uncontrolled blood pressure  · Neurosurgery okay with patient being prone as long as he can keep the cervical collar in place  · Nephrology discussed with IR who were agreeable to have patient last on his side for the procedure    · Dr Pablito Ballard recommending cervical collar remain in place for at least additional 2 weeks  · Renal biopsy performed 5/26  · Creatinine improving to 4 72, no urgent need for dialysis at this time    Nephrology following

## 2022-05-28 NOTE — PROGRESS NOTES
NEPHROLOGY PROGRESS NOTE   Opal Gonzalez 71 y o  male MRN: [id number]  Unit/Bed#: -Yaya Encounter: [id number]  Reason for Consult:  Acute kidney injury    ASSESSMENT/PLAN:  1  Acute kidney injury, etiology attributed to likely post infectious GN as well as in component of ATN  Renal function slightly improving, previous serological workup unremarkable  Renal biopsy is currently pending  2  Nephrotic range proteinuria, renal biopsy is currently pending  Pending anti HUGH 2 antibody  3  Acute on chronic anemia status post PRBC transfusion would recommend checking iron stores    4  Diastolic heart failure, clinically appears euvolemic  5  Hyperphosphatemia, some phosphate binders change to non calcium based binders  6  Hypercalcemia, appears stable  7  Hypertension  8  Recent MRSA surgical site infection currently remains on doxycycline    PLAN:  · Replace potassium currently 3 3  · Renal function slowly improving  · Continue to encourage oral intake  · Check iron stores hemoglobin 7 1    SUBJECTIVE:  Seen and examined  Patient awake alert  Overall is doing reasonably well  Denies any chest pain shortness of breath  No reports of abdominal pain  Appetite appears stable  Review of Systems    OBJECTIVE:  Current Weight: Weight - Scale: 103 kg (226 lb 9 6 oz)  Vitals:    05/28/22 0156 05/28/22 0555 05/28/22 0728 05/28/22 1447   BP:   120/51 165/82   BP Location:       Pulse:    63   Resp:   19    Temp:   97 8 °F (36 6 °C) (!) 97 4 °F (36 3 °C)   TempSrc:       SpO2: 94%   96%   Weight:  103 kg (226 lb 9 6 oz)     Height:           Intake/Output Summary (Last 24 hours) at 5/28/2022 1511  Last data filed at 5/28/2022 0801  Gross per 24 hour   Intake 236 ml   Output 1075 ml   Net -839 ml       Physical Exam  Constitutional:       Appearance: He is obese  He is not ill-appearing  HENT:      Head: Normocephalic and atraumatic  Eyes:      General: No scleral icterus    Cardiovascular:      Rate and Rhythm: Normal rate and regular rhythm  Pulmonary:      Effort: Pulmonary effort is normal       Breath sounds: Normal breath sounds  Abdominal:      General: There is distension  Palpations: Abdomen is soft  Tenderness: There is no abdominal tenderness  Musculoskeletal:      Right lower leg: No edema  Left lower leg: No edema  Skin:     General: Skin is warm and dry  Findings: No rash  Neurological:      Mental Status: He is alert and oriented to person, place, and time           Medications:    Current Facility-Administered Medications:     acetaminophen (TYLENOL) tablet 650 mg, 650 mg, Oral, Q6H PRN, Ceci Nichole PA-C, 650 mg at 05/28/22 0843    amLODIPine (NORVASC) tablet 10 mg, 10 mg, Oral, Daily, Naty Awad PA-C    docusate sodium (COLACE) capsule 100 mg, 100 mg, Oral, Daily, Jonelle Lou PA-C, 100 mg at 05/28/22 0844    doxazosin (CARDURA) tablet 1 mg, 1 mg, Oral, HS, Nella Arriaza, EDWINNP, 1 mg at 05/27/22 2138    doxycycline hyclate (VIBRAMYCIN) capsule 100 mg, 100 mg, Oral, Q12H [address], Jonelle Lou PA-C, 100 mg at 05/28/22 0844    DULoxetine (CYMBALTA) delayed release capsule 60 mg, 60 mg, Oral, Daily, Jonelle Lou PA-C, 60 mg at 05/28/22 0844    gabapentin (NEURONTIN) capsule 100 mg, 100 mg, Oral, Daily, Jonelle Lou PA-C, 100 mg at 05/28/22 0844    gabapentin (NEURONTIN) capsule 300 mg, 300 mg, Oral, HS, Jonelle Lou PA-C, 300 mg at 05/27/22 2138    heparin (porcine) 25,000 units in 0 45% NaCl 250 mL infusion (premix), 3-30 Units/kg/hr (Order-Specific), Intravenous, Titrated, Marj GUZMAN PA-C, Last Rate: 11 mL/hr at 05/28/22 1415, 11 Units/kg/hr at 05/28/22 1415    heparin (porcine) injection 4,000 Units, 4,000 Units, Intravenous, Q1H PRN, Pandi Todhe, DO, 4,000 Units at 05/24/22 2300    heparin (porcine) injection 8,000 Units, 8,000 Units, Intravenous, Q1H PRN, Mak Farmer DO    hydrALAZINE (APRESOLINE) injection 10 mg, 10 mg, Intravenous, Q6H PRN, Martha Brands, DO    lidocaine (LIDODERM) 5 % patch 1 patch, 1 patch, Topical, Daily PRN, Annette Flores PA-C, 1 patch at 05/17/22 0032    lidocaine (LIDODERM) 5 % patch 2 patch, 2 patch, Topical, Daily, Mak NaeemapoorvajbornDO, 2 patch at 05/26/22 0947    methocarbamol (ROBAXIN) tablet 750 mg, 750 mg, Oral, Q6H PRN, Bill Linda PA-C, 750 mg at 05/26/22 2245    metoprolol succinate (TOPROL-XL) 24 hr tablet 100 mg, 100 mg, Oral, Daily, Jonelle Lou PA-C, 100 mg at 05/28/22 0844    ondansetron (ZOFRAN) injection 4 mg, 4 mg, Intravenous, Q4H PRN, Annette Flores PA-C, 4 mg at 05/27/22 1045    pantoprazole (PROTONIX) EC tablet 40 mg, 40 mg, Oral, BID AC, Jonelle Lou PA-C, 40 mg at 05/28/22 0554    pravastatin (PRAVACHOL) tablet 40 mg, 40 mg, Oral, Daily With Dinner, Bill Linda PA-C, 40 mg at 05/27/22 1719    senna (SENOKOT) tablet 8 6 mg, 1 tablet, Oral, HS PRN, Bill Linda PA-C, 8 6 mg at 05/25/22 0915    sevelamer (RENAGEL) tablet 800 mg, 800 mg, Oral, TID With Meals, Ayaz Le MD, 800 mg at 05/28/22 1222    tamsulosin (FLOMAX) capsule 0 4 mg, 0 4 mg, Oral, Daily With Dinner, Bill Linda PA-C, 0 4 mg at 05/27/22 1719    warfarin (COUMADIN) tablet 2 5 mg, 2 5 mg, Oral, Once per day on Sun Mon Tue Thu Fri, Marj GUZMAN PA-C, 2 5 mg at 05/27/22 1719    warfarin (COUMADIN) tablet 5 mg, 5 mg, Oral, Once per day on Wed Sat, Marj Orozco V [place]    Laboratory Results:  Results from last 7 days   Lab Units 05/28/22  0313 05/27/22  1938 05/27/22  1015 05/27/22  0219 05/27/22  0049 05/26/22  0350 05/25/22  0455 05/24/22  0432 05/23/22  0453 05/22/22  0608   WBC Thousand/uL 5 33  --   --   --  5 21 5 10 5 04 5 82 5 97 5 35   HEMOGLOBIN g/dL 7 1* 7 9* 7 7* 6 5* 6 4* 7 1* 7 5* 7 4* 7 4* 7 9*   HEMATOCRIT % 23 0* 24 5* 24 2* 19 4* 20 2* 21 9* 23 8* 23 4* 23 8* 24 8*   PLATELETS Thousands/uL 131*  --   --   --  136* 161 165 173 180 174   POTASSIUM mmol/L 3 3*  --   --   --  3 6 3 5 3 6 3 5 3 5 4 2   CHLORIDE mmol/L 101  --   --   --  103 102 103 103 104 104   CO2 mmol/L 22  --   --   --  26 25 26 27 24 25   BUN mg/dL 58*  --   --   --  60* 60* 54* 50* 51* 52*   CREATININE mg/dL 4 72*  --   --   --  5 02* 5 60* 5 44* 5 46* 5 26* 5 49*   CALCIUM mg/dL 8 4  --   --   --  8 4 8 2* 8 5 8 4 8 2* 8 7   MAGNESIUM mg/dL  --   --   --   --   --   --   --  1 5* 1 5* 1 4*   PHOSPHORUS mg/dL  --   --   --   --   --   --   --  6 0* 5 2* 5 3*

## 2022-05-28 NOTE — PLAN OF CARE
Problem: PAIN - ADULT  Goal: Verbalizes/displays adequate comfort level or baseline comfort level  Description: Interventions:  - Encourage patient to monitor pain and request assistance  - Assess pain using appropriate pain scale  - Administer analgesics based on type and severity of pain and evaluate response  - Implement non-pharmacological measures as appropriate and evaluate response  - Consider cultural and social influences on pain and pain management  - Notify physician/advanced practitioner if interventions unsuccessful or patient reports new pain  Outcome: Progressing     Problem: INFECTION - ADULT  Goal: Absence or prevention of progression during hospitalization  Description: INTERVENTIONS:  - Assess and monitor for signs and symptoms of infection  - Monitor lab/diagnostic results  - Monitor all insertion sites, i e  indwelling lines, tubes, and drains  - Monitor endotracheal if appropriate and nasal secretions for changes in amount and color  - [place] appropriate cooling/warming therapies per order  - Administer medications as ordered  - Instruct and encourage patient and family to use good hand hygiene technique  - Identify and instruct in appropriate isolation precautions for identified infection/condition  Outcome: Progressing     Problem: DISCHARGE PLANNING  Goal: Discharge to home or other facility with appropriate resources  Description: INTERVENTIONS:  - Identify barriers to discharge w/patient and caregiver  - Arrange for needed discharge resources and transportation as appropriate  - Identify discharge learning needs (meds, wound care, etc )  - Arrange for interpretive services to assist at discharge as needed  - Refer to Case Management Department for coordinating discharge planning if the patient needs post-hospital services based on physician/advanced practitioner order or complex needs related to functional status, cognitive ability, or social support system  Outcome: Progressing Problem: Knowledge Deficit  Goal: Patient/family/caregiver demonstrates understanding of disease process, treatment plan, medications, and discharge instructions  Description: Complete learning assessment and assess knowledge base  Interventions:  - Provide teaching at level of understanding  - Provide teaching via preferred learning methods  Outcome: Progressing     Problem: SAFETY ADULT  Goal: Maintain or return to baseline ADL function  Description: INTERVENTIONS:  -  Assess patient's ability to carry out ADLs; assess patient's baseline for ADL function and identify physical deficits which impact ability to perform ADLs (bathing, care of mouth/teeth, toileting, grooming, dressing, etc )  - Assess/evaluate cause of self-care deficits   - Assess range of motion  - Assess patient's mobility; develop plan if impaired  - Assess patient's need for assistive devices and provide as appropriate  - Encourage maximum independence but intervene and supervise when necessary  - Involve family in performance of ADLs  - Assess for home care needs following discharge   - Consider OT consult to assist with ADL evaluation and planning for discharge  - Provide patient education as appropriate  Outcome: Progressing  Goal: Maintains/Returns to pre admission functional level  Description: INTERVENTIONS:  - Perform BMAT or MOVE assessment daily    - Set and communicate daily mobility goal to care team and patient/family/caregiver  - Collaborate with rehabilitation services on mobility goals if consulted  - Perform Range of Motion 5 times a day  - Reposition patient every 3 hours    - Dangle patient 3 times a day  - Stand patient 3 times a day  - Ambulate patient 3 times a day  - Out of bed to chair 3 times a day   - Out of bed for meals 3 times a day  - Out of bed for toileting  - Record patient progress and toleration of activity level   Outcome: Progressing  Goal: Patient will remain free of falls  Description: INTERVENTIONS:  - Educate patient/family on patient safety including physical limitations  - Instruct patient to call for assistance with activity   - Consult OT/PT to assist with strengthening/mobility   - Keep Call bell within reach  - Keep bed low and locked with side rails adjusted as appropriate  - Keep care items and personal belongings within reach  - Initiate and maintain comfort rounds  - Make Fall Risk Sign visible to staff  - Offer Toileting every 2 Hours, in advance of need  - Initiate/Maintain bed alarm  - Obtain necessary fall risk management equipment: non slip socks  - Apply yellow socks and bracelet for high fall risk patients  - Consider moving patient to room near nurses station  Outcome: Progressing     Problem: MOBILITY - ADULT  Goal: Maintain or return to baseline ADL function  Description: INTERVENTIONS:  -  Assess patient's ability to carry out ADLs; assess patient's baseline for ADL function and identify physical deficits which impact ability to perform ADLs (bathing, care of mouth/teeth, toileting, grooming, dressing, etc )  - Assess/evaluate cause of self-care deficits   - Assess range of motion  - Assess patient's mobility; develop plan if impaired  - Assess patient's need for assistive devices and provide as appropriate  - Encourage maximum independence but intervene and supervise when necessary  - Involve family in performance of ADLs  - Assess for home care needs following discharge   - Consider OT consult to assist with ADL evaluation and planning for discharge  - Provide patient education as appropriate  Outcome: Progressing  Goal: Maintains/Returns to pre admission functional level  Description: INTERVENTIONS:  - Perform BMAT or MOVE assessment daily    - Set and communicate daily mobility goal to care team and patient/family/caregiver  - Collaborate with rehabilitation services on mobility goals if consulted  - Perform Range of Motion 5 times a day  - Reposition patient every 3 hours    - Dangle patient 3 times a day  - Stand patient 3 times a day  - Ambulate patient 3 times a day  - Out of bed to chair 3 times a day   - Out of bed for meals 3 times a day  - Out of bed for toileting  - Record patient progress and toleration of activity level   Outcome: Progressing     Problem: Prexisting or High Potential for Compromised Skin Integrity  Goal: Skin integrity is maintained or improved  Description: INTERVENTIONS:  - Identify patients at risk for skin breakdown  - Assess and monitor skin integrity  - Assess and monitor nutrition and hydration status  - Monitor labs   - Assess for incontinence   - Turn and reposition patient  - Assist with mobility/ambulation  - Relieve pressure over bony prominences  - Avoid friction and shearing  - Provide appropriate hygiene as needed including keeping skin clean and dry  - Evaluate need for skin moisturizer/barrier cream  - Collaborate with interdisciplinary team   - Patient/family teaching  - Consider wound care consult   Outcome: Progressing     Problem: Nutrition/Hydration-ADULT  Goal: Nutrient/Hydration intake appropriate for improving, restoring or maintaining nutritional needs  Description: Monitor and assess patient's nutrition/hydration status for malnutrition  Collaborate with interdisciplinary team and initiate plan and interventions as ordered  Monitor patient's weight and dietary intake as ordered or per policy  Utilize nutrition screening tool and intervene as necessary  Determine patient's food preferences and provide high-protein, high-caloric foods as appropriate       INTERVENTIONS:  - Monitor oral intake, urinary output, labs, and treatment plans  - Assess nutrition and hydration status and recommend course of action  - Evaluate amount of meals eaten  - Assist patient with eating if necessary   - Allow adequate time for meals  - Recommend/ encourage appropriate diets, oral nutritional supplements, and vitamin/mineral supplements  - Order, calculate, and assess calorie counts as needed  - Recommend, monitor, and adjust tube feedings and TPN/PPN based on assessed needs  - Assess need for intravenous fluids  - Provide specific nutrition/hydration education as appropriate  - Include patient/family/caregiver in decisions related to nutrition  Outcome: Progressing     Problem: METABOLIC, FLUID AND ELECTROLYTES - ADULT  Goal: Electrolytes maintained within normal limits  Description: INTERVENTIONS:  - Monitor labs and assess patient for signs and symptoms of electrolyte imbalances  - Administer electrolyte replacement as ordered  - Monitor response to electrolyte replacements, including repeat lab results as appropriate  - Instruct patient on fluid and nutrition as appropriate  Outcome: Progressing  Goal: Fluid balance maintained  Description: INTERVENTIONS:  - Monitor labs   - Monitor I/O and WT  - Instruct patient on fluid and nutrition as appropriate  - Assess for signs & symptoms of volume excess or deficit  Outcome: Progressing     Problem: SKIN/TISSUE INTEGRITY - ADULT  Goal: Skin Integrity remains intact(Skin Breakdown Prevention)  Description: Assess:  -Perform Matt assessment every shift  -Clean and moisturize skin every day  -Inspect skin when repositioning, toileting, and assisting with ADLS  -Assess under medical devices such as bracelets every shift  -Assess extremities for adequate circulation and sensation     Bed Management:  -Have minimal linens on bed & keep smooth, unwrinkled  -Change linens as needed when moist or perspiring  -Avoid sitting or lying in one position for more than 2 hours while in bed  -Keep HOB at 30 degrees     Toileting:  -Offer bedside commode  -Assess for incontinence every 2 hours  -Use incontinent care products after each incontinent episode such as barrier cream    Activity:  -Mobilize patient 3 times a day  -Encourage activity and walks on unit  -Encourage or provide ROM exercises   -Turn and reposition patient every 2 Hours  -Use appropriate equipment to lift or move patient in bed  -Instruct/ Assist with weight shifting every 3 hours when out of bed in chair  -Consider limitation of chair time 3 hour intervals    Skin Care:  -Avoid use of baby powder, tape, friction and shearing, hot water or constrictive clothing  -Relieve pressure over bony prominences using allevyn  -Do not massage red bony areas    Next Steps:  -Teach patient strategies to minimize risks such as shifting weight   -Consider consults to  interdisciplinary teams such as wound care  Outcome: Progressing  Goal: Incision(s), wounds(s) or drain site(s) healing without S/S of infection  Description: INTERVENTIONS  - Assess and document dressing, incision, wound bed, drain sites and surrounding tissue  - Provide patient and family education  - Perform skin care/dressing changes every day  Outcome: Progressing     Problem: HEMATOLOGIC - ADULT  Goal: Maintains hematologic stability  Description: INTERVENTIONS  - Assess for signs and symptoms of bleeding or hemorrhage  - Monitor labs  - Administer supportive blood products/factors as ordered and appropriate  Outcome: Progressing     Problem: MUSCULOSKELETAL - ADULT  Goal: Maintain or return mobility to safest level of function  Description: INTERVENTIONS:  - Assess patient's ability to carry out ADLs; assess patient's baseline for ADL function and identify physical deficits which impact ability to perform ADLs (bathing, care of mouth/teeth, toileting, grooming, dressing, etc )  - Assess/evaluate cause of self-care deficits   - Assess range of motion  - Assess patient's mobility  - Assess patient's need for assistive devices and provide as appropriate  - Encourage maximum independence but intervene and supervise when necessary  - Involve family in performance of ADLs  - Assess for home care needs following discharge   - Consider OT consult to assist with ADL evaluation and planning for discharge  - Provide patient education as appropriate  Outcome: Progressing

## 2022-05-28 NOTE — PLAN OF CARE
Problem: PAIN - ADULT  Goal: Verbalizes/displays adequate comfort level or baseline comfort level  Description: Interventions:  - Encourage patient to monitor pain and request assistance  - Assess pain using appropriate pain scale  - Administer analgesics based on type and severity of pain and evaluate response  - Implement non-pharmacological measures as appropriate and evaluate response  - Consider cultural and social influences on pain and pain management  - Notify physician/advanced practitioner if interventions unsuccessful or patient reports new pain  Outcome: Progressing     Problem: INFECTION - ADULT  Goal: Absence or prevention of progression during hospitalization  Description: INTERVENTIONS:  - Assess and monitor for signs and symptoms of infection  - Monitor lab/diagnostic results  - Monitor all insertion sites, i e  indwelling lines, tubes, and drains  - Monitor endotracheal if appropriate and nasal secretions for changes in amount and color  - [place] appropriate cooling/warming therapies per order  - Administer medications as ordered  - Instruct and encourage patient and family to use good hand hygiene technique  - Identify and instruct in appropriate isolation precautions for identified infection/condition  Outcome: Progressing     Problem: DISCHARGE PLANNING  Goal: Discharge to home or other facility with appropriate resources  Description: INTERVENTIONS:  - Identify barriers to discharge w/patient and caregiver  - Arrange for needed discharge resources and transportation as appropriate  - Identify discharge learning needs (meds, wound care, etc )  - Arrange for interpretive services to assist at discharge as needed  - Refer to Case Management Department for coordinating discharge planning if the patient needs post-hospital services based on physician/advanced practitioner order or complex needs related to functional status, cognitive ability, or social support system  Outcome: Progressing Problem: Knowledge Deficit  Goal: Patient/family/caregiver demonstrates understanding of disease process, treatment plan, medications, and discharge instructions  Description: Complete learning assessment and assess knowledge base  Interventions:  - Provide teaching at level of understanding  - Provide teaching via preferred learning methods  Outcome: Progressing     Problem: Prexisting or High Potential for Compromised Skin Integrity  Goal: Skin integrity is maintained or improved  Description: INTERVENTIONS:  - Identify patients at risk for skin breakdown  - Assess and monitor skin integrity  - Assess and monitor nutrition and hydration status  - Monitor labs   - Assess for incontinence   - Turn and reposition patient  - Assist with mobility/ambulation  - Relieve pressure over bony prominences  - Avoid friction and shearing  - Provide appropriate hygiene as needed including keeping skin clean and dry  - Evaluate need for skin moisturizer/barrier cream  - Collaborate with interdisciplinary team   - Patient/family teaching  - Consider wound care consult   Outcome: Progressing     Problem: Nutrition/Hydration-ADULT  Goal: Nutrient/Hydration intake appropriate for improving, restoring or maintaining nutritional needs  Description: Monitor and assess patient's nutrition/hydration status for malnutrition  Collaborate with interdisciplinary team and initiate plan and interventions as ordered  Monitor patient's weight and dietary intake as ordered or per policy  Utilize nutrition screening tool and intervene as necessary  Determine patient's food preferences and provide high-protein, high-caloric foods as appropriate       INTERVENTIONS:  - Monitor oral intake, urinary output, labs, and treatment plans  - Assess nutrition and hydration status and recommend course of action  - Evaluate amount of meals eaten  - Assist patient with eating if necessary   - Allow adequate time for meals  - Recommend/ encourage appropriate diets, oral nutritional supplements, and vitamin/mineral supplements  - Order, calculate, and assess calorie counts as needed  - Recommend, monitor, and adjust tube feedings and TPN/PPN based on assessed needs  - Assess need for intravenous fluids  - Provide specific nutrition/hydration education as appropriate  - Include patient/family/caregiver in decisions related to nutrition  Outcome: Progressing     Problem: METABOLIC, FLUID AND ELECTROLYTES - ADULT  Goal: Electrolytes maintained within normal limits  Description: INTERVENTIONS:  - Monitor labs and assess patient for signs and symptoms of electrolyte imbalances  - Administer electrolyte replacement as ordered  - Monitor response to electrolyte replacements, including repeat lab results as appropriate  - Instruct patient on fluid and nutrition as appropriate  Outcome: Progressing  Goal: Fluid balance maintained  Description: INTERVENTIONS:  - Monitor labs   - Monitor I/O and WT  - Instruct patient on fluid and nutrition as appropriate  - Assess for signs & symptoms of volume excess or deficit  Outcome: Progressing     Problem: SKIN/TISSUE INTEGRITY - ADULT  Goal: Skin Integrity remains intact(Skin Breakdown Prevention)  Description: Assess:  -Perform Matt assessment every shift  -Clean and moisturize skin every day  -Inspect skin when repositioning, toileting, and assisting with ADLS  -Assess under medical devices such as bracelets every shift  -Assess extremities for adequate circulation and sensation     Bed Management:  -Have minimal linens on bed & keep smooth, unwrinkled  -Change linens as needed when moist or perspiring  -Avoid sitting or lying in one position for more than 2 hours while in bed  -Keep HOB at 30 degrees     Toileting:  -Offer bedside commode  -Assess for incontinence every 2 hours  -Use incontinent care products after each incontinent episode such as barrier cream    Activity:  -Mobilize patient 3 times a day  -Encourage activity and walks on unit  -Encourage or provide ROM exercises   -Turn and reposition patient every 2 Hours  -Use appropriate equipment to lift or move patient in bed  -Instruct/ Assist with weight shifting every 3 hours when out of bed in chair  -Consider limitation of chair time 3 hour intervals    Skin Care:  -Avoid use of baby powder, tape, friction and shearing, hot water or constrictive clothing  -Relieve pressure over bony prominences using allevyn  -Do not massage red bony areas    Next Steps:  -Teach patient strategies to minimize risks such as shifting weight   -Consider consults to  interdisciplinary teams such as wound care  Outcome: Progressing  Goal: Incision(s), wounds(s) or drain site(s) healing without S/S of infection  Description: INTERVENTIONS  - Assess and document dressing, incision, wound bed, drain sites and surrounding tissue  - Provide patient and family education  - Perform skin care/dressing changes every day  Outcome: Progressing     Problem: HEMATOLOGIC - ADULT  Goal: Maintains hematologic stability  Description: INTERVENTIONS  - Assess for signs and symptoms of bleeding or hemorrhage  - Monitor labs  - Administer supportive blood products/factors as ordered and appropriate  Outcome: Progressing     Problem: MUSCULOSKELETAL - ADULT  Goal: Maintain or return mobility to safest level of function  Description: INTERVENTIONS:  - Assess patient's ability to carry out ADLs; assess patient's baseline for ADL function and identify physical deficits which impact ability to perform ADLs (bathing, care of mouth/teeth, toileting, grooming, dressing, etc )  - Assess/evaluate cause of self-care deficits   - Assess range of motion  - Assess patient's mobility  - Assess patient's need for assistive devices and provide as appropriate  - Encourage maximum independence but intervene and supervise when necessary  - Involve family in performance of ADLs  - Assess for home care needs following discharge   - Consider OT consult to assist with ADL evaluation and planning for discharge  - Provide patient education as appropriate  Outcome: Progressing     Problem: MOBILITY - ADULT  Goal: Maintain or return to baseline ADL function  Description: INTERVENTIONS:  -  Assess patient's ability to carry out ADLs; assess patient's baseline for ADL function and identify physical deficits which impact ability to perform ADLs (bathing, care of mouth/teeth, toileting, grooming, dressing, etc )  - Assess/evaluate cause of self-care deficits   - Assess range of motion  - Assess patient's mobility; develop plan if impaired  - Assess patient's need for assistive devices and provide as appropriate  - Encourage maximum independence but intervene and supervise when necessary  - Involve family in performance of ADLs  - Assess for home care needs following discharge   - Consider OT consult to assist with ADL evaluation and planning for discharge  - Provide patient education as appropriate  Outcome: Progressing  Goal: Maintains/Returns to pre admission functional level  Description: INTERVENTIONS:  - Perform BMAT or MOVE assessment daily    - Set and communicate daily mobility goal to care team and patient/family/caregiver  - Collaborate with rehabilitation services on mobility goals if consulted  - Perform Range of Motion 5 times a day  - Reposition patient every 3 hours    - Dangle patient 3 times a day  - Stand patient 3 times a day  - Ambulate patient 3 times a day  - Out of bed to chair 3 times a day   - Out of bed for meals 3 times a day  - Out of bed for toileting  - Record patient progress and toleration of activity level   Outcome: Progressing     Problem: SAFETY ADULT  Goal: Maintain or return to baseline ADL function  Description: INTERVENTIONS:  -  Assess patient's ability to carry out ADLs; assess patient's baseline for ADL function and identify physical deficits which impact ability to perform ADLs (bathing, care of mouth/teeth, toileting, grooming, dressing, etc )  - Assess/evaluate cause of self-care deficits   - Assess range of motion  - Assess patient's mobility; develop plan if impaired  - Assess patient's need for assistive devices and provide as appropriate  - Encourage maximum independence but intervene and supervise when necessary  - Involve family in performance of ADLs  - Assess for home care needs following discharge   - Consider OT consult to assist with ADL evaluation and planning for discharge  - Provide patient education as appropriate  Outcome: Progressing  Goal: Maintains/Returns to pre admission functional level  Description: INTERVENTIONS:  - Perform BMAT or MOVE assessment daily    - Set and communicate daily mobility goal to care team and patient/family/caregiver  - Collaborate with rehabilitation services on mobility goals if consulted  - Perform Range of Motion 5 times a day  - Reposition patient every 3 hours    - Dangle patient 3 times a day  - Stand patient 3 times a day  - Ambulate patient 3 times a day  - Out of bed to chair 3 times a day   - Out of bed for meals 3 times a day  - Out of bed for toileting  - Record patient progress and toleration of activity level   Outcome: Progressing  Goal: Patient will remain free of falls  Description: INTERVENTIONS:  - Educate patient/family on patient safety including physical limitations  - Instruct patient to call for assistance with activity   - Consult OT/PT to assist with strengthening/mobility   - Keep Call bell within reach  - Keep bed low and locked with side rails adjusted as appropriate  - Keep care items and personal belongings within reach  - Initiate and maintain comfort rounds  - Make Fall Risk Sign visible to staff  - Offer Toileting every 2 Hours, in advance of need  - Initiate/Maintain bed alarm  - Obtain necessary fall risk management equipment: non slip socks  - Apply yellow socks and bracelet for high fall risk patients  - Consider moving patient to room near nurses station  Outcome: Progressing

## 2022-05-28 NOTE — ASSESSMENT & PLAN NOTE
· S/p cervical fusion performed by Dr Mak Dudley on 0/69/94, complicated by MRSA infection  · Completed 24 days of IV vancomycin, transitioned to daptomycin on 04/24-4/29    Discharged home on doxycycline 100 mg b i d    · Continue doxycycline  · Will need follow up with Neurosurgery

## 2022-05-28 NOTE — ASSESSMENT & PLAN NOTE
Wt Readings from Last 3 Encounters:   05/28/22 103 kg (226 lb 9 6 oz)   05/03/22 114 kg (251 lb)   04/29/22 113 kg (250 lb 1 6 oz)     · Prior echo 04/25/2022:  EF 21%, Diastolic function: grade 2 pseudonormal relaxation  · Monitor I/O and daily weights  · Does not take outpatient diuretics  IV Lasix with minimal results during prior admission    Patient had received albumin to help with anasarca  · Fluid restriction, 2 g sodium

## 2022-05-28 NOTE — ASSESSMENT & PLAN NOTE
· Patient has PAF  He denies palpitations    · Patient maintained sinus rhythm  · Continue Toprol  · INR subtherapeutic 1 31  · Continue IV heparin gtt to Coumadin bridge

## 2022-05-29 LAB
ANION GAP SERPL CALCULATED.3IONS-SCNC: 8 MMOL/L (ref 4–13)
APTT PPP: 100 SECONDS (ref 23–37)
APTT PPP: 55 SECONDS (ref 23–37)
APTT PPP: 92 SECONDS (ref 23–37)
BASOPHILS # BLD AUTO: 0.04 THOUSANDS/ΜL (ref 0–0.1)
BASOPHILS NFR BLD AUTO: 1 % (ref 0–1)
BUN SERPL-MCNC: 54 MG/DL (ref 5–25)
CALCIUM SERPL-MCNC: 8.2 MG/DL (ref 8.3–10.1)
CHLORIDE SERPL-SCNC: 102 MMOL/L (ref 100–108)
CO2 SERPL-SCNC: 26 MMOL/L (ref 21–32)
CREAT SERPL-MCNC: 4.65 MG/DL (ref 0.6–1.3)
EOSINOPHIL # BLD AUTO: 0.23 THOUSAND/ΜL (ref 0–0.61)
EOSINOPHIL NFR BLD AUTO: 4 % (ref 0–6)
ERYTHROCYTE [DISTWIDTH] IN BLOOD BY AUTOMATED COUNT: 13.8 % (ref 11.6–15.1)
FERRITIN SERPL-MCNC: 602 NG/ML (ref 8–388)
FOLATE SERPL-MCNC: 6.7 NG/ML (ref 3.1–17.5)
GFR SERPL CREATININE-BSD FRML MDRD: 11 ML/MIN/1.73SQ M
GLUCOSE SERPL-MCNC: 91 MG/DL (ref 65–140)
HCT VFR BLD AUTO: 25.5 % (ref 36.5–49.3)
HGB BLD-MCNC: 8.1 G/DL (ref 12–17)
IMM GRANULOCYTES # BLD AUTO: 0.01 THOUSAND/UL (ref 0–0.2)
IMM GRANULOCYTES NFR BLD AUTO: 0 % (ref 0–2)
INR PPP: 1.56 (ref 0.84–1.19)
IRON SATN MFR SERPL: 27 % (ref 20–50)
IRON SERPL-MCNC: 71 UG/DL (ref 65–175)
LYMPHOCYTES # BLD AUTO: 1.72 THOUSANDS/ΜL (ref 0.6–4.47)
LYMPHOCYTES NFR BLD AUTO: 33 % (ref 14–44)
MCH RBC QN AUTO: 28.9 PG (ref 26.8–34.3)
MCHC RBC AUTO-ENTMCNC: 31.8 G/DL (ref 31.4–37.4)
MCV RBC AUTO: 91 FL (ref 82–98)
MONOCYTES # BLD AUTO: 0.54 THOUSAND/ΜL (ref 0.17–1.22)
MONOCYTES NFR BLD AUTO: 10 % (ref 4–12)
NEUTROPHILS # BLD AUTO: 2.74 THOUSANDS/ΜL (ref 1.85–7.62)
NEUTS SEG NFR BLD AUTO: 52 % (ref 43–75)
NRBC BLD AUTO-RTO: 0 /100 WBCS
PLATELET # BLD AUTO: 134 THOUSANDS/UL (ref 149–390)
PMV BLD AUTO: 10.8 FL (ref 8.9–12.7)
POTASSIUM SERPL-SCNC: 3.3 MMOL/L (ref 3.5–5.3)
PROTHROMBIN TIME: 18.4 SECONDS (ref 11.6–14.5)
RBC # BLD AUTO: 2.8 MILLION/UL (ref 3.88–5.62)
SODIUM SERPL-SCNC: 136 MMOL/L (ref 136–145)
TIBC SERPL-MCNC: 265 UG/DL (ref 250–450)
VIT B12 SERPL-MCNC: 280 PG/ML (ref 100–900)
WBC # BLD AUTO: 5.28 THOUSAND/UL (ref 4.31–10.16)

## 2022-05-29 PROCEDURE — 83540 ASSAY OF IRON: CPT | Performed by: PHYSICIAN ASSISTANT

## 2022-05-29 PROCEDURE — 85025 COMPLETE CBC W/AUTO DIFF WBC: CPT | Performed by: PHYSICIAN ASSISTANT

## 2022-05-29 PROCEDURE — 85730 THROMBOPLASTIN TIME PARTIAL: CPT | Performed by: PHYSICIAN ASSISTANT

## 2022-05-29 PROCEDURE — 85610 PROTHROMBIN TIME: CPT | Performed by: PHYSICIAN ASSISTANT

## 2022-05-29 PROCEDURE — 80048 BASIC METABOLIC PNL TOTAL CA: CPT | Performed by: PHYSICIAN ASSISTANT

## 2022-05-29 PROCEDURE — 99232 SBSQ HOSP IP/OBS MODERATE 35: CPT | Performed by: HOSPITALIST

## 2022-05-29 PROCEDURE — 99232 SBSQ HOSP IP/OBS MODERATE 35: CPT | Performed by: INTERNAL MEDICINE

## 2022-05-29 PROCEDURE — 85730 THROMBOPLASTIN TIME PARTIAL: CPT | Performed by: HOSPITALIST

## 2022-05-29 PROCEDURE — 83550 IRON BINDING TEST: CPT | Performed by: PHYSICIAN ASSISTANT

## 2022-05-29 PROCEDURE — 82746 ASSAY OF FOLIC ACID SERUM: CPT | Performed by: PHYSICIAN ASSISTANT

## 2022-05-29 PROCEDURE — 82607 VITAMIN B-12: CPT | Performed by: PHYSICIAN ASSISTANT

## 2022-05-29 PROCEDURE — 82728 ASSAY OF FERRITIN: CPT | Performed by: PHYSICIAN ASSISTANT

## 2022-05-29 RX ORDER — POTASSIUM CHLORIDE 20 MEQ/1
40 TABLET, EXTENDED RELEASE ORAL ONCE
Status: COMPLETED | OUTPATIENT
Start: 2022-05-29 | End: 2022-05-29

## 2022-05-29 RX ADMIN — GABAPENTIN 100 MG: 100 CAPSULE ORAL at 08:16

## 2022-05-29 RX ADMIN — SEVELAMER HYDROCHLORIDE 800 MG: 800 TABLET, FILM COATED PARENTERAL at 12:13

## 2022-05-29 RX ADMIN — PANTOPRAZOLE SODIUM 40 MG: 40 TABLET, DELAYED RELEASE ORAL at 16:37

## 2022-05-29 RX ADMIN — DOCUSATE SODIUM 100 MG: 100 CAPSULE, LIQUID FILLED ORAL at 08:15

## 2022-05-29 RX ADMIN — DULOXETINE 60 MG: 30 CAPSULE, DELAYED RELEASE ORAL at 08:16

## 2022-05-29 RX ADMIN — METOPROLOL SUCCINATE 100 MG: 50 TABLET, EXTENDED RELEASE ORAL at 08:14

## 2022-05-29 RX ADMIN — PRAVASTATIN SODIUM 40 MG: 40 TABLET ORAL at 16:37

## 2022-05-29 RX ADMIN — AMLODIPINE BESYLATE 10 MG: 5 TABLET ORAL at 08:14

## 2022-05-29 RX ADMIN — POTASSIUM CHLORIDE 40 MEQ: 1500 TABLET, EXTENDED RELEASE ORAL at 08:17

## 2022-05-29 RX ADMIN — DOXAZOSIN 1 MG: 1 TABLET ORAL at 22:27

## 2022-05-29 RX ADMIN — DOXYCYCLINE 100 MG: 100 CAPSULE ORAL at 08:16

## 2022-05-29 RX ADMIN — GABAPENTIN 300 MG: 300 CAPSULE ORAL at 22:27

## 2022-05-29 RX ADMIN — DOXYCYCLINE 100 MG: 100 CAPSULE ORAL at 22:27

## 2022-05-29 RX ADMIN — PANTOPRAZOLE SODIUM 40 MG: 40 TABLET, DELAYED RELEASE ORAL at 05:36

## 2022-05-29 RX ADMIN — HEPARIN SODIUM 4000 UNITS: 1000 INJECTION INTRAVENOUS; SUBCUTANEOUS at 16:35

## 2022-05-29 RX ADMIN — HEPARIN SODIUM 9 UNITS/KG/HR: 10000 INJECTION, SOLUTION INTRAVENOUS at 12:13

## 2022-05-29 RX ADMIN — SEVELAMER HYDROCHLORIDE 800 MG: 800 TABLET, FILM COATED PARENTERAL at 08:17

## 2022-05-29 RX ADMIN — TAMSULOSIN HYDROCHLORIDE 0.4 MG: 0.4 CAPSULE ORAL at 16:40

## 2022-05-29 RX ADMIN — WARFARIN SODIUM 2.5 MG: 2.5 TABLET ORAL at 17:32

## 2022-05-29 RX ADMIN — SEVELAMER HYDROCHLORIDE 800 MG: 800 TABLET, FILM COATED PARENTERAL at 16:38

## 2022-05-29 NOTE — PLAN OF CARE
Problem: PAIN - ADULT  Goal: Verbalizes/displays adequate comfort level or baseline comfort level  Description: Interventions:  - Encourage patient to monitor pain and request assistance  - Assess pain using appropriate pain scale  - Administer analgesics based on type and severity of pain and evaluate response  - Implement non-pharmacological measures as appropriate and evaluate response  - Consider cultural and social influences on pain and pain management  - Notify physician/advanced practitioner if interventions unsuccessful or patient reports new pain  Outcome: Progressing     Problem: INFECTION - ADULT  Goal: Absence or prevention of progression during hospitalization  Description: INTERVENTIONS:  - Assess and monitor for signs and symptoms of infection  - Monitor lab/diagnostic results  - Monitor all insertion sites, i e  indwelling lines, tubes, and drains  - Monitor endotracheal if appropriate and nasal secretions for changes in amount and color  - [place] appropriate cooling/warming therapies per order  - Administer medications as ordered  - Instruct and encourage patient and family to use good hand hygiene technique  - Identify and instruct in appropriate isolation precautions for identified infection/condition  Outcome: Progressing     Problem: DISCHARGE PLANNING  Goal: Discharge to home or other facility with appropriate resources  Description: INTERVENTIONS:  - Identify barriers to discharge w/patient and caregiver  - Arrange for needed discharge resources and transportation as appropriate  - Identify discharge learning needs (meds, wound care, etc )  - Arrange for interpretive services to assist at discharge as needed  - Refer to Case Management Department for coordinating discharge planning if the patient needs post-hospital services based on physician/advanced practitioner order or complex needs related to functional status, cognitive ability, or social support system  Outcome: Progressing Problem: Knowledge Deficit  Goal: Patient/family/caregiver demonstrates understanding of disease process, treatment plan, medications, and discharge instructions  Description: Complete learning assessment and assess knowledge base  Interventions:  - Provide teaching at level of understanding  - Provide teaching via preferred learning methods  Outcome: Progressing     Problem: SAFETY ADULT  Goal: Maintain or return to baseline ADL function  Description: INTERVENTIONS:  -  Assess patient's ability to carry out ADLs; assess patient's baseline for ADL function and identify physical deficits which impact ability to perform ADLs (bathing, care of mouth/teeth, toileting, grooming, dressing, etc )  - Assess/evaluate cause of self-care deficits   - Assess range of motion  - Assess patient's mobility; develop plan if impaired  - Assess patient's need for assistive devices and provide as appropriate  - Encourage maximum independence but intervene and supervise when necessary  - Involve family in performance of ADLs  - Assess for home care needs following discharge   - Consider OT consult to assist with ADL evaluation and planning for discharge  - Provide patient education as appropriate  Outcome: Progressing  Goal: Maintains/Returns to pre admission functional level  Description: INTERVENTIONS:  - Perform BMAT or MOVE assessment daily    - Set and communicate daily mobility goal to care team and patient/family/caregiver  - Collaborate with rehabilitation services on mobility goals if consulted  - Perform Range of Motion 5 times a day  - Reposition patient every 3 hours    - Dangle patient 3 times a day  - Stand patient 3 times a day  - Ambulate patient 3 times a day  - Out of bed to chair 3 times a day   - Out of bed for meals 3 times a day  - Out of bed for toileting  - Record patient progress and toleration of activity level   Outcome: Progressing  Goal: Patient will remain free of falls  Description: INTERVENTIONS:  - Educate patient/family on patient safety including physical limitations  - Instruct patient to call for assistance with activity   - Consult OT/PT to assist with strengthening/mobility   - Keep Call bell within reach  - Keep bed low and locked with side rails adjusted as appropriate  - Keep care items and personal belongings within reach  - Initiate and maintain comfort rounds  - Make Fall Risk Sign visible to staff  - Offer Toileting every 2 Hours, in advance of need  - Initiate/Maintain bed alarm  - Obtain necessary fall risk management equipment: non slip socks  - Apply yellow socks and bracelet for high fall risk patients  - Consider moving patient to room near nurses station  Outcome: Progressing     Problem: MOBILITY - ADULT  Goal: Maintain or return to baseline ADL function  Description: INTERVENTIONS:  -  Assess patient's ability to carry out ADLs; assess patient's baseline for ADL function and identify physical deficits which impact ability to perform ADLs (bathing, care of mouth/teeth, toileting, grooming, dressing, etc )  - Assess/evaluate cause of self-care deficits   - Assess range of motion  - Assess patient's mobility; develop plan if impaired  - Assess patient's need for assistive devices and provide as appropriate  - Encourage maximum independence but intervene and supervise when necessary  - Involve family in performance of ADLs  - Assess for home care needs following discharge   - Consider OT consult to assist with ADL evaluation and planning for discharge  - Provide patient education as appropriate  Outcome: Progressing  Goal: Maintains/Returns to pre admission functional level  Description: INTERVENTIONS:  - Perform BMAT or MOVE assessment daily    - Set and communicate daily mobility goal to care team and patient/family/caregiver  - Collaborate with rehabilitation services on mobility goals if consulted  - Perform Range of Motion 5 times a day  - Reposition patient every 3 hours    - Dangle patient 3 times a day  - Stand patient 3 times a day  - Ambulate patient 3 times a day  - Out of bed to chair 3 times a day   - Out of bed for meals 3 times a day  - Out of bed for toileting  - Record patient progress and toleration of activity level   Outcome: Progressing     Problem: Prexisting or High Potential for Compromised Skin Integrity  Goal: Skin integrity is maintained or improved  Description: INTERVENTIONS:  - Identify patients at risk for skin breakdown  - Assess and monitor skin integrity  - Assess and monitor nutrition and hydration status  - Monitor labs   - Assess for incontinence   - Turn and reposition patient  - Assist with mobility/ambulation  - Relieve pressure over bony prominences  - Avoid friction and shearing  - Provide appropriate hygiene as needed including keeping skin clean and dry  - Evaluate need for skin moisturizer/barrier cream  - Collaborate with interdisciplinary team   - Patient/family teaching  - Consider wound care consult   Outcome: Progressing     Problem: Nutrition/Hydration-ADULT  Goal: Nutrient/Hydration intake appropriate for improving, restoring or maintaining nutritional needs  Description: Monitor and assess patient's nutrition/hydration status for malnutrition  Collaborate with interdisciplinary team and initiate plan and interventions as ordered  Monitor patient's weight and dietary intake as ordered or per policy  Utilize nutrition screening tool and intervene as necessary  Determine patient's food preferences and provide high-protein, high-caloric foods as appropriate       INTERVENTIONS:  - Monitor oral intake, urinary output, labs, and treatment plans  - Assess nutrition and hydration status and recommend course of action  - Evaluate amount of meals eaten  - Assist patient with eating if necessary   - Allow adequate time for meals  - Recommend/ encourage appropriate diets, oral nutritional supplements, and vitamin/mineral supplements  - Order, calculate, and assess calorie counts as needed  - Recommend, monitor, and adjust tube feedings and TPN/PPN based on assessed needs  - Assess need for intravenous fluids  - Provide specific nutrition/hydration education as appropriate  - Include patient/family/caregiver in decisions related to nutrition  Outcome: Progressing     Problem: METABOLIC, FLUID AND ELECTROLYTES - ADULT  Goal: Electrolytes maintained within normal limits  Description: INTERVENTIONS:  - Monitor labs and assess patient for signs and symptoms of electrolyte imbalances  - Administer electrolyte replacement as ordered  - Monitor response to electrolyte replacements, including repeat lab results as appropriate  - Instruct patient on fluid and nutrition as appropriate  Outcome: Progressing  Goal: Fluid balance maintained  Description: INTERVENTIONS:  - Monitor labs   - Monitor I/O and WT  - Instruct patient on fluid and nutrition as appropriate  - Assess for signs & symptoms of volume excess or deficit  Outcome: Progressing     Problem: SKIN/TISSUE INTEGRITY - ADULT  Goal: Skin Integrity remains intact(Skin Breakdown Prevention)  Description: Assess:  -Perform Matt assessment every shift  -Clean and moisturize skin every day  -Inspect skin when repositioning, toileting, and assisting with ADLS  -Assess under medical devices such as bracelets every shift  -Assess extremities for adequate circulation and sensation     Bed Management:  -Have minimal linens on bed & keep smooth, unwrinkled  -Change linens as needed when moist or perspiring  -Avoid sitting or lying in one position for more than 2 hours while in bed  -Keep HOB at 30 degrees     Toileting:  -Offer bedside commode  -Assess for incontinence every 2 hours  -Use incontinent care products after each incontinent episode such as barrier cream    Activity:  -Mobilize patient 3 times a day  -Encourage activity and walks on unit  -Encourage or provide ROM exercises   -Turn and reposition patient every 2 Hours  -Use appropriate equipment to lift or move patient in bed  -Instruct/ Assist with weight shifting every 3 hours when out of bed in chair  -Consider limitation of chair time 3 hour intervals    Skin Care:  -Avoid use of baby powder, tape, friction and shearing, hot water or constrictive clothing  -Relieve pressure over bony prominences using allevyn  -Do not massage red bony areas    Next Steps:  -Teach patient strategies to minimize risks such as shifting weight   -Consider consults to  interdisciplinary teams such as wound care  Outcome: Progressing  Goal: Incision(s), wounds(s) or drain site(s) healing without S/S of infection  Description: INTERVENTIONS  - Assess and document dressing, incision, wound bed, drain sites and surrounding tissue  - Provide patient and family education  - Perform skin care/dressing changes every day  Outcome: Progressing     Problem: HEMATOLOGIC - ADULT  Goal: Maintains hematologic stability  Description: INTERVENTIONS  - Assess for signs and symptoms of bleeding or hemorrhage  - Monitor labs  - Administer supportive blood products/factors as ordered and appropriate  Outcome: Progressing     Problem: MUSCULOSKELETAL - ADULT  Goal: Maintain or return mobility to safest level of function  Description: INTERVENTIONS:  - Assess patient's ability to carry out ADLs; assess patient's baseline for ADL function and identify physical deficits which impact ability to perform ADLs (bathing, care of mouth/teeth, toileting, grooming, dressing, etc )  - Assess/evaluate cause of self-care deficits   - Assess range of motion  - Assess patient's mobility  - Assess patient's need for assistive devices and provide as appropriate  - Encourage maximum independence but intervene and supervise when necessary  - Involve family in performance of ADLs  - Assess for home care needs following discharge   - Consider OT consult to assist with ADL evaluation and planning for discharge  - Provide patient education as appropriate  Outcome: Progressing

## 2022-05-29 NOTE — PLAN OF CARE
Problem: PAIN - ADULT  Goal: Verbalizes/displays adequate comfort level or baseline comfort level  Description: Interventions:  - Encourage patient to monitor pain and request assistance  - Assess pain using appropriate pain scale  - Administer analgesics based on type and severity of pain and evaluate response  - Implement non-pharmacological measures as appropriate and evaluate response  - Consider cultural and social influences on pain and pain management  - Notify physician/advanced practitioner if interventions unsuccessful or patient reports new pain  Outcome: Progressing     Problem: INFECTION - ADULT  Goal: Absence or prevention of progression during hospitalization  Description: INTERVENTIONS:  - Assess and monitor for signs and symptoms of infection  - Monitor lab/diagnostic results  - Monitor all insertion sites, i e  indwelling lines, tubes, and drains  - Monitor endotracheal if appropriate and nasal secretions for changes in amount and color  - [place] appropriate cooling/warming therapies per order  - Administer medications as ordered  - Instruct and encourage patient and family to use good hand hygiene technique  - Identify and instruct in appropriate isolation precautions for identified infection/condition  Outcome: Progressing     Problem: DISCHARGE PLANNING  Goal: Discharge to home or other facility with appropriate resources  Description: INTERVENTIONS:  - Identify barriers to discharge w/patient and caregiver  - Arrange for needed discharge resources and transportation as appropriate  - Identify discharge learning needs (meds, wound care, etc )  - Arrange for interpretive services to assist at discharge as needed  - Refer to Case Management Department for coordinating discharge planning if the patient needs post-hospital services based on physician/advanced practitioner order or complex needs related to functional status, cognitive ability, or social support system  Outcome: Progressing Problem: Knowledge Deficit  Goal: Patient/family/caregiver demonstrates understanding of disease process, treatment plan, medications, and discharge instructions  Description: Complete learning assessment and assess knowledge base  Interventions:  - Provide teaching at level of understanding  - Provide teaching via preferred learning methods  Outcome: Progressing     Problem: SAFETY ADULT  Goal: Maintain or return to baseline ADL function  Description: INTERVENTIONS:  -  Assess patient's ability to carry out ADLs; assess patient's baseline for ADL function and identify physical deficits which impact ability to perform ADLs (bathing, care of mouth/teeth, toileting, grooming, dressing, etc )  - Assess/evaluate cause of self-care deficits   - Assess range of motion  - Assess patient's mobility; develop plan if impaired  - Assess patient's need for assistive devices and provide as appropriate  - Encourage maximum independence but intervene and supervise when necessary  - Involve family in performance of ADLs  - Assess for home care needs following discharge   - Consider OT consult to assist with ADL evaluation and planning for discharge  - Provide patient education as appropriate  Outcome: Progressing  Goal: Maintains/Returns to pre admission functional level  Description: INTERVENTIONS:  - Perform BMAT or MOVE assessment daily    - Set and communicate daily mobility goal to care team and patient/family/caregiver  - Collaborate with rehabilitation services on mobility goals if consulted  - Perform Range of Motion 5 times a day  - Reposition patient every 3 hours    - Dangle patient 3 times a day  - Stand patient 3 times a day  - Ambulate patient 3 times a day  - Out of bed to chair 3 times a day   - Out of bed for meals 3 times a day  - Out of bed for toileting  - Record patient progress and toleration of activity level   Outcome: Progressing  Goal: Patient will remain free of falls  Description: INTERVENTIONS:  - Educate patient/family on patient safety including physical limitations  - Instruct patient to call for assistance with activity   - Consult OT/PT to assist with strengthening/mobility   - Keep Call bell within reach  - Keep bed low and locked with side rails adjusted as appropriate  - Keep care items and personal belongings within reach  - Initiate and maintain comfort rounds  - Make Fall Risk Sign visible to staff  - Offer Toileting every 2 Hours, in advance of need  - Initiate/Maintain bed alarm  - Obtain necessary fall risk management equipment: non slip socks  - Apply yellow socks and bracelet for high fall risk patients  - Consider moving patient to room near nurses station  Outcome: Progressing

## 2022-05-29 NOTE — ASSESSMENT & PLAN NOTE
Wt Readings from Last 3 Encounters:   05/29/22 102 kg (225 lb 6 4 oz)   05/03/22 114 kg (251 lb)   04/29/22 113 kg (250 lb 1 6 oz)     · Prior echo 04/25/2022:  EF 05%, Diastolic function: grade 2 pseudonormal relaxation  · Monitor I/O and daily weights  · Does not take outpatient diuretics  IV Lasix with minimal results during prior admission    Patient had received albumin to help with anasarca  · Fluid restriction, 2 g sodium

## 2022-05-29 NOTE — ASSESSMENT & PLAN NOTE
· Patient admitted with acute kidney injury felt to be most likely due to post infectious  glomerulonephritis  · Was unable have kidney biopsy due to cervical collar not allowing him to lay on his stomach and uncontrolled blood pressure  · Neurosurgery okay with patient being prone as long as he can keep the cervical collar in place  · Nephrology discussed with IR who were agreeable to have patient last on his side for the procedure    · Dr Pelon Gates recommending cervical collar remain in place for at least additional 2 weeks  · Renal biopsy performed 5/26  · Creatinine improving further, no urgent need for dialysis at this time    Nephrology following

## 2022-05-29 NOTE — PROGRESS NOTES
NEPHROLOGY PROGRESS NOTE   Nolan Arriaza 71 y o  male MRN: [id number]  Unit/Bed#: -01 Encounter: [id number]  Reason for Consult:  Acute kidney injury    ASSESSMENT/PLAN:  1  Acute kidney injury, etiology attributed to likely post infectious GN as well as in component of ATN  Renal function slightly improving, previous serological workup unremarkable  Renal biopsy is currently pending  2  Nephrotic range proteinuria, renal biopsy is currently pending  Pending anti HUGH 2 antibody  3  Acute on chronic anemia status post PRBC transfusion, adequate iron stores, unfortunately unable to use EARLE therapy given relative contraindication due to factor 5 Leiden deficiency  4  Diastolic heart failure, clinically appears euvolemic  5  Hyperphosphatemia, continue phosphate binders recently changed to non calcium based binders  6  Hypercalcemia, appears resolved  7  Hypertension  8  Recent MRSA surgical site infection currently remains on doxycycline    PLAN:  · Overall renal function has continued to improve with a creatinine of 4 65  · Volume status and blood pressure appears stable  · Continue potassium replacement as needed  · Iron studies appear stable  · Unfortunately relative contraindication to EARLE therapy given history of factor 5 Leiden deficiency  · If renal function continues to improve early next week okay to discontinue PermCath  · Await kidney biopsy    SUBJECTIVE:  Seen and examined  Patient doing well  Denies any chest pain or shortness breath  No abdominal pain  Appetite appears stable      Review of Systems    OBJECTIVE:  Current Weight: Weight - Scale: 102 kg (225 lb 6 4 oz)  Vitals:    05/28/22 2134 05/29/22 0535 05/29/22 0751 05/29/22 1518   BP: 158/75  155/74 152/74   BP Location: Left arm      Pulse: 60  60 59   Resp: 18  18    Temp: 97 9 °F (36 6 °C)  97 9 °F (36 6 °C) 97 5 °F (36 4 °C)   TempSrc: Oral      SpO2: 96%  97% 99%   Weight:  102 kg (225 lb 6 4 oz)     Height: Intake/Output Summary (Last 24 hours) at 5/29/2022 1522  Last data filed at 5/29/2022 1351  Gross per 24 hour   Intake 1260 ml   Output 2175 ml   Net -915 ml       Physical Exam  Constitutional:       Appearance: He is not ill-appearing  HENT:      Head: Normocephalic and atraumatic  Eyes:      General: No scleral icterus  Cardiovascular:      Rate and Rhythm: Normal rate and regular rhythm  Pulmonary:      Effort: Pulmonary effort is normal       Breath sounds: Normal breath sounds  Abdominal:      General: There is no distension  Palpations: Abdomen is soft  Musculoskeletal:      Right lower leg: Edema (Trace) present  Left lower leg: Edema ( trace) present  Skin:     General: Skin is warm and dry  Neurological:      Mental Status: He is alert and oriented to person, place, and time           Medications:    Current Facility-Administered Medications:     acetaminophen (TYLENOL) tablet 650 mg, 650 mg, Oral, Q6H PRN, Burak Redmond PA-C, 650 mg at 05/28/22 2133    amLODIPine (NORVASC) tablet 10 mg, 10 mg, Oral, Daily, Reanna Awad PA-C, 10 mg at 05/29/22 0060    docusate sodium (COLACE) capsule 100 mg, 100 mg, Oral, Daily, Jonelle Lou PA-C, 100 mg at 05/29/22 0815    doxazosin (CARDURA) tablet 1 mg, 1 mg, Oral, HS, Joshua Pfeiffer, CRNP, 1 mg at 05/28/22 2132    doxycycline hyclate (VIBRAMYCIN) capsule 100 mg, 100 mg, Oral, Q12H [address], Jonelle Lou PA-C, 100 mg at 05/29/22 0816    DULoxetine (CYMBALTA) delayed release capsule 60 mg, 60 mg, Oral, Daily, Jonelle Lou PA-C, 60 mg at 05/29/22 0816    gabapentin (NEURONTIN) capsule 100 mg, 100 mg, Oral, Daily, Jonelle Lou PA-C, 100 mg at 05/29/22 0816    gabapentin (NEURONTIN) capsule 300 mg, 300 mg, Oral, HS, Jonelle Lou PA-C, 300 mg at 05/28/22 2132    heparin (porcine) 25,000 units in 0 45% NaCl 250 mL infusion (premix), 3-30 Units/kg/hr (Order-Specific), Intravenous, Titrated, Marj GUZMAN PA-C, Last Rate: 9 mL/hr at 05/29/22 1213, 9 Units/kg/hr at 05/29/22 1213    heparin (porcine) injection 4,000 Units, 4,000 Units, Intravenous, Q1H PRN, Mak Farmer DO, 4,000 Units at 05/24/22 2300    heparin (porcine) injection 8,000 Units, 8,000 Units, Intravenous, Q1H PRN, Mak Farmer DO    hydrALAZINE (APRESOLINE) injection 10 mg, 10 mg, Intravenous, Q6H PRN, Kyle Lou, DO    lidocaine (LIDODERM) 5 % patch 1 patch, 1 patch, Topical, Daily PRN, Annette Flores PA-C, 1 patch at 05/17/22 0032    lidocaine (LIDODERM) 5 % patch 2 patch, 2 patch, Topical, Daily, Mak Farmer DO, 2 patch at 05/26/22 0947    methocarbamol (ROBAXIN) tablet 750 mg, 750 mg, Oral, Q6H PRN, Moreno Willis PA-C, 750 mg at 05/28/22 2133    metoprolol succinate (TOPROL-XL) 24 hr tablet 100 mg, 100 mg, Oral, Daily, Jonelle Lou PA-C, 100 mg at 05/29/22 0814    ondansetron (ZOFRAN) injection 4 mg, 4 mg, Intravenous, Q4H PRN, Annette Flores PA-C, 4 mg at 05/27/22 1045    pantoprazole (PROTONIX) EC tablet 40 mg, 40 mg, Oral, BID AC, Jonelle Lou PA-C, 40 mg at 05/29/22 0536    pravastatin (PRAVACHOL) tablet 40 mg, 40 mg, Oral, Daily With Dinner, Morneo Willis PA-C, 40 mg at 05/28/22 1730    senna (SENOKOT) tablet 8 6 mg, 1 tablet, Oral, HS PRN, Moreno Willis PA-C, 8 6 mg at 05/25/22 0915    sevelamer (RENAGEL) tablet 800 mg, 800 mg, Oral, TID With Meals, Gianfranco Vicente MD, 800 mg at 05/29/22 1213    tamsulosin (FLOMAX) capsule 0 4 mg, 0 4 mg, Oral, Daily With Dinner, Moreno Willis PA-C, 0 4 mg at 05/28/22 1729    warfarin (COUMADIN) tablet 2 5 mg, 2 5 mg, Oral, Once per day on Sun Mon Tue Thu Fri, Marj GUZMAN PA-C, 2 5 mg at 05/27/22 1719    warfarin (COUMADIN) tablet 5 mg, 5 mg, Oral, Once per day on Wed Sat, Rogelio Welsh, 5 mg at 05/28/22 1729    Laboratory Results:  Results from last 7 days   Lab Units 05/29/22  0536 05/28/22  1817 05/28/22  7442 05/27/22  1938 05/27/22  1015 05/27/22  7041 05/27/22  0049 05/26/22  0350 05/25/22  0455 05/24/22  4031 05/23/22  0453   WBC Thousand/uL 5 28  --  5 33  --   --   --  5 21 5 10 5 04 5 82 5 97   HEMOGLOBIN g/dL 8 1* 8 2* 7 1* 7 9* 7 7* 6 5* 6 4* 7 1* 7 5* 7 4* 7 4*   HEMATOCRIT % 25 5* 25 4* 23 0* 24 5* 24 2* 19 4* 20 2* 21 9* 23 8* 23 4* 23 8*   PLATELETS Thousands/uL 134*  --  131*  --   --   --  136* 161 165 173 180   POTASSIUM mmol/L 3 3*  --  3 3*  --   --   --  3 6 3 5 3 6 3 5 3 5   CHLORIDE mmol/L 102  --  101  --   --   --  103 102 103 103 104   CO2 mmol/L 26  --  22  --   --   --  26 25 26 27 24   BUN mg/dL 54*  --  58*  --   --   --  60* 60* 54* 50* 51*   CREATININE mg/dL 4 65*  --  4 72*  --   --   --  5 02* 5 60* 5 44* 5 46* 5 26*   CALCIUM mg/dL 8 2*  --  8 4  --   --   --  8 4 8 2* 8 5 8 4 8 2*   MAGNESIUM mg/dL  --   --   --   --   --   --   --   --   --  1 5* 1 5*   PHOSPHORUS mg/dL  --   --   --   --   --   --   --   --   --  6 0* 5 2*

## 2022-05-29 NOTE — ASSESSMENT & PLAN NOTE
· Patient has PAF  He denies palpitations  · Patient maintained sinus rhythm  · Continue Toprol  · INR subtherapeutic but improving    · Continue IV heparin gtt to Coumadin bridge

## 2022-05-29 NOTE — ASSESSMENT & PLAN NOTE
· S/p cervical fusion performed by Dr Jose L Henderson on 3/05/15, complicated by MRSA infection  · Completed 24 days of IV vancomycin, transitioned to daptomycin on 04/24-4/29    Discharged home on doxycycline 100 mg b i d    · Continue doxycycline  · Will need follow up with Neurosurgery

## 2022-05-29 NOTE — PROGRESS NOTES
[place]  Progress Note - Gina Hooper 1950, 71 y o  male MRN: [id number]  Unit/Bed#: -Yaya Encounter: [id number]  Primary Care Provider: Jeffrey Nguyen MD   Date and time admitted to hospital: 5/10/2022  6:51 PM    Chronic diastolic (congestive) heart failure [facility])  Assessment & Plan  Wt Readings from Last 3 Encounters:   05/29/22 102 kg (225 lb 6 4 oz)   05/03/22 114 kg (251 lb)   04/29/22 113 kg (250 lb 1 6 oz)     · Prior echo 04/25/2022:  EF 50%, Diastolic function: grade 2 pseudonormal relaxation  · Monitor I/O and daily weights  · Does not take outpatient diuretics  IV Lasix with minimal results during prior admission  Patient had received albumin to help with anasarca  · Fluid restriction, 2 g sodium    Glomerulonephritis  Assessment & Plan  · Diagnosed during prior admission  · Postinfectious suspected  · PermCath placed 5/23  · Renal biopsy performed 5/26    Surgical site infection  Assessment & Plan  · S/p cervical fusion performed by Dr Jose Luis Joshua on 4/95/70, complicated by MRSA infection  · Completed 24 days of IV vancomycin, transitioned to daptomycin on 04/24-4/29  Discharged home on doxycycline 100 mg b i d    · Continue doxycycline  · Will need follow up with Neurosurgery      Anemia  Assessment & Plan  · Patient has chronic anemia  He denies signs of GI  bleeding  · Did have decreased to 6 5 overnight following renal biopsy  · CT abdomen/pelvis without evidence of hematoma  · S/p 1 unit PRBCs 5/26  · Hemoglobin 7 1 - will recheck later today  Patient denies evidence of active bleeding  · Suspect renal function also contributing to persistent anemia  · Check stool occult/folate/vitamin b12/iron panel  · Trend CBC    Mixed hyperlipidemia  Assessment & Plan  · Continue statin    Paroxysmal A-fib (HCC)  Assessment & Plan  · Patient has PAF  He denies palpitations    · Patient maintained sinus rhythm  · Continue Toprol  · INR subtherapeutic but improving  · Continue IV heparin gtt to Coumadin bridge    Depression, major, single episode, moderate (HCC)  Assessment & Plan  · Continue Cymbalta    Factor V Leiden mutation [facility])  Assessment & Plan  · Patient has history of left lower extremity DVT after which he was diagnosed with factor 5 Leiden mutation  This happened years ago  · Hold Coumadin prior to biopsy  · Continues on heparin drip to Coumadin bridge  · Goal INR 2-3  · Daily INR - as of this morning 1 56    WILL (obstructive sleep apnea)  Assessment & Plan  · CPAP HS - patient brought home machine in    Essential hypertension  Assessment & Plan  · Home regimen:  Amlodipine 5 mg daily and metoprolol succinate 100 mg daily  · Amlodipine had been increased to 10 mg daily during this admission  · Consider reducing back to 5 mg daily if persistent hypotension despite discontinuation of hydralazine  · Hydralazine discontinued per Nephrology given low blood pressures    * JANEL (acute kidney injury) ([facility] [address] )  Assessment & Plan  · Patient admitted with acute kidney injury felt to be most likely due to post infectious  glomerulonephritis  · Was unable have kidney biopsy due to cervical collar not allowing him to lay on his stomach and uncontrolled blood pressure  · Neurosurgery okay with patient being prone as long as he can keep the cervical collar in place  · Nephrology discussed with IR who were agreeable to have patient last on his side for the procedure    · Dr Ace Felton recommending cervical collar remain in place for at least additional 2 weeks  · Renal biopsy performed 5/26  · Creatinine improving further, no urgent need for dialysis at this time  Nephrology following         VTE  Prophylaxis:   Pharmacologic: in place    Patient Centered Rounds: I have performed bedside rounds with nursing staff today      Discussions with Specialists or Other Care Team Provider: case management    Education and Discussions with Family / Patient: pt wife via pt telephone      Current Length of Stay: 19 day(s)    Current Patient Status: Inpatient        Code Status: Level 3 - DNAR and DNI      Subjective:   Denies complaints  Resting comfortably  Patient is seen and examined at bedside  All other ROS are negative  Objective:     Vitals:   Temp (24hrs), Av 7 °F (36 5 °C), Min:97 4 °F (36 3 °C), Max:97 9 °F (36 6 °C)    Temp:  [97 4 °F (36 3 °C)-97 9 °F (36 6 °C)] 97 9 °F (36 6 °C)  HR:  [60-63] 60  Resp:  [18] 18  BP: (155-165)/(74-82) 155/74  SpO2:  [96 %-97 %] 97 %  Body mass index is 31 44 kg/m²  Input and Output Summary (last 24 hours): Intake/Output Summary (Last 24 hours) at 2022 1301  Last data filed at 2022 1209  Gross per 24 hour   Intake 1260 ml   Output 1700 ml   Net -440 ml       Physical Exam:       GEN: No acute distress, comfortable, in neck brace  HEEENT: No JVD, PERRLA, no scleral icterus  RESP: Lungs clear to auscultation bilaterally  CV: RRR, +s1/s2   ABD: SOFT NON TENDER, POSITIVE BOWEL SOUNDS, NO DISTENTION  PSYCH: CALM  NEURO: A X O X 3, NO FOCAL DEFICITS  SKIN: NO RASH  EXTREM: NO EDEMA    Additional Data:     Labs:    Results from last 7 days   Lab Units 22  0536   WBC Thousand/uL 5 28   HEMOGLOBIN g/dL 8 1*   HEMATOCRIT % 25 5*   PLATELETS Thousands/uL 134*   NEUTROS PCT % 52   LYMPHS PCT % 33   MONOS PCT % 10   EOS PCT % 4     Results from last 7 days   Lab Units 22  0536 22  0313 22  0049   SODIUM mmol/L 136   < > 136   POTASSIUM mmol/L 3 3*   < > 3 6   CHLORIDE mmol/L 102   < > 103   CO2 mmol/L 26   < > 26   BUN mg/dL 54*   < > 60*   CREATININE mg/dL 4 65*   < > 5 02*   ANION GAP mmol/L 8   < > 7   CALCIUM mg/dL 8 2*   < > 8 4   ALBUMIN g/dL  --   --  1 7*   TOTAL BILIRUBIN mg/dL  --   --  0 40   ALK PHOS U/L  --   --  68   ALT U/L  --   --  <6*   AST U/L  --   --  21   GLUCOSE RANDOM mg/dL 91   < > 113    < > = values in this interval not displayed       Results from last 7 days   Lab Units 05/29/22  0536   INR  1 56*                       * I Have Reviewed All Lab Data Listed Above  Imaging:     Results for orders placed during the hospital encounter of 04/22/22    XR chest 1 view portable    Addendum 4/22/2022  8:39 PM  ADDENDUM:    PICC line tip projects over the region of the mid SVC  Narrative  CHEST    INDICATION:   weakness  COMPARISON:  None    EXAM PERFORMED/VIEWS:  XR CHEST PORTABLE      FINDINGS:    Cardiomediastinal silhouette appears unremarkable  The lungs are clear  No pneumothorax or pleural effusion  Osseous structures appear within normal limits for patient age  Impression  No acute cardiopulmonary disease  Workstation performed: [id number]    No results found for this or any previous visit        *I have reviewed all imaging reports listed above      Recent Cultures (last 7 days):           Last 24 Hours Medication List:   Current Facility-Administered Medications   Medication Dose Route Frequency Provider Last Rate    acetaminophen  650 mg Oral Q6H PRN VEENA Bonds-ALTAGRACIA      amLODIPine  10 mg Oral Daily VEENA Coreas-ALTAGRACIA      docusate sodium  100 mg Oral Daily Katie Gonzalez PA-C      doxazosin  1 mg Oral HS [place] CRTERRI      doxycycline hyclate  100 mg Oral Q12H [address] Katie Gonzalez PA-C      DULoxetine  60 mg Oral Daily Katie Gonzalez PA-C      gabapentin  100 mg Oral Daily Katie Gonzalez PA-C      gabapentin  300 mg Oral HS VEENA Bonds-ALTAGRACIA      heparin (porcine)  3-30 Units/kg/hr (Order-Specific) Intravenous Titrated [address] V, PA-C 9 Units/kg/hr (05/29/22 1213)    heparin (porcine)  4,000 Units Intravenous Q1H PRN Pandi Todhe, DO      heparin (porcine)  8,000 Units Intravenous Q1H PRN Pandi Todhe, DO      hydrALAZINE  10 mg Intravenous Q6H PRN Ruben Palmer, DO      lidocaine  1 patch Topical Daily PRN Annette Flores PA-C      lidocaine  2 patch Topical Daily Judsoni Tostacey, DO      methocarbamol  750 mg Oral Q6H PRN Katie Gonzalez FABIOLA      metoprolol succinate  100 mg Oral Daily Anabel Dakins, PA-C      ondansetron  4 mg Intravenous Q4H PRN Annette Flores PA-C      pantoprazole  40 mg Oral BID AC Jonelle Lou PA-C      pravastatin  40 mg Oral Daily With FABIOLA Nesbitt      senna  1 tablet Oral HS PRN Anabel Dakins, PA-C      sevelamer  800 mg Oral TID With Meals Alyx Ramos MD      tamsulosin  0 4 mg Oral Daily With Dinner Anabel Dakins, PA-C      warfarin  2 5 mg Oral Once per day on Sun Mon Tue Thu Fri Mak Franco PA-C      warfarin  5 mg Oral Once per day on Wed Sat Mak Franco PA-C          Today, Patient Was Seen By: Olu David MD    ** Please Note: Dictation voice to text software may have been used in the creation of this document   **

## 2022-05-29 NOTE — ASSESSMENT & PLAN NOTE
· Patient has history of left lower extremity DVT after which he was diagnosed with factor 5 Leiden mutation  This happened years ago    · Hold Coumadin prior to biopsy  · Continues on heparin drip to Coumadin bridge  · Goal INR 2-3  · Daily INR - as of this morning 1 56

## 2022-05-30 LAB
ALBUMIN SERPL BCP-MCNC: 1.8 G/DL (ref 3.5–5)
ALP SERPL-CCNC: 77 U/L (ref 46–116)
ALT SERPL W P-5'-P-CCNC: 6 U/L (ref 12–78)
ANION GAP SERPL CALCULATED.3IONS-SCNC: 9 MMOL/L (ref 4–13)
APTT PPP: 56 SECONDS (ref 23–37)
APTT PPP: 59 SECONDS (ref 23–37)
APTT PPP: 98 SECONDS (ref 23–37)
AST SERPL W P-5'-P-CCNC: 17 U/L (ref 5–45)
BASOPHILS # BLD AUTO: 0.04 THOUSANDS/ΜL (ref 0–0.1)
BASOPHILS NFR BLD AUTO: 1 % (ref 0–1)
BILIRUB SERPL-MCNC: 0.4 MG/DL (ref 0.2–1)
BUN SERPL-MCNC: 57 MG/DL (ref 5–25)
CALCIUM ALBUM COR SERPL-MCNC: 10 MG/DL (ref 8.3–10.1)
CALCIUM SERPL-MCNC: 8.2 MG/DL (ref 8.3–10.1)
CHLORIDE SERPL-SCNC: 102 MMOL/L (ref 100–108)
CO2 SERPL-SCNC: 24 MMOL/L (ref 21–32)
CREAT SERPL-MCNC: 4.78 MG/DL (ref 0.6–1.3)
EOSINOPHIL # BLD AUTO: 0.19 THOUSAND/ΜL (ref 0–0.61)
EOSINOPHIL NFR BLD AUTO: 3 % (ref 0–6)
ERYTHROCYTE [DISTWIDTH] IN BLOOD BY AUTOMATED COUNT: 13.9 % (ref 11.6–15.1)
GFR SERPL CREATININE-BSD FRML MDRD: 11 ML/MIN/1.73SQ M
GLUCOSE SERPL-MCNC: 87 MG/DL (ref 65–140)
HCT VFR BLD AUTO: 23.2 % (ref 36.5–49.3)
HEMOCCULT STL QL: NEGATIVE
HGB BLD-MCNC: 7.5 G/DL (ref 12–17)
IMM GRANULOCYTES # BLD AUTO: 0.02 THOUSAND/UL (ref 0–0.2)
IMM GRANULOCYTES NFR BLD AUTO: 0 % (ref 0–2)
INR PPP: 1.8 (ref 0.84–1.19)
LYMPHOCYTES # BLD AUTO: 1.64 THOUSANDS/ΜL (ref 0.6–4.47)
LYMPHOCYTES NFR BLD AUTO: 30 % (ref 14–44)
MCH RBC QN AUTO: 29.1 PG (ref 26.8–34.3)
MCHC RBC AUTO-ENTMCNC: 32.3 G/DL (ref 31.4–37.4)
MCV RBC AUTO: 90 FL (ref 82–98)
MONOCYTES # BLD AUTO: 0.49 THOUSAND/ΜL (ref 0.17–1.22)
MONOCYTES NFR BLD AUTO: 9 % (ref 4–12)
NEUTROPHILS # BLD AUTO: 3.16 THOUSANDS/ΜL (ref 1.85–7.62)
NEUTS SEG NFR BLD AUTO: 57 % (ref 43–75)
NRBC BLD AUTO-RTO: 0 /100 WBCS
PLATELET # BLD AUTO: 151 THOUSANDS/UL (ref 149–390)
PMV BLD AUTO: 11.2 FL (ref 8.9–12.7)
POTASSIUM SERPL-SCNC: 3.3 MMOL/L (ref 3.5–5.3)
PROT SERPL-MCNC: 5.4 G/DL (ref 6.4–8.2)
PROTHROMBIN TIME: 20.5 SECONDS (ref 11.6–14.5)
RBC # BLD AUTO: 2.58 MILLION/UL (ref 3.88–5.62)
SODIUM SERPL-SCNC: 135 MMOL/L (ref 136–145)
WBC # BLD AUTO: 5.54 THOUSAND/UL (ref 4.31–10.16)

## 2022-05-30 PROCEDURE — 85025 COMPLETE CBC W/AUTO DIFF WBC: CPT | Performed by: INTERNAL MEDICINE

## 2022-05-30 PROCEDURE — 80053 COMPREHEN METABOLIC PANEL: CPT | Performed by: INTERNAL MEDICINE

## 2022-05-30 PROCEDURE — 82272 OCCULT BLD FECES 1-3 TESTS: CPT | Performed by: PHYSICIAN ASSISTANT

## 2022-05-30 PROCEDURE — 85610 PROTHROMBIN TIME: CPT | Performed by: INTERNAL MEDICINE

## 2022-05-30 PROCEDURE — 99232 SBSQ HOSP IP/OBS MODERATE 35: CPT | Performed by: INTERNAL MEDICINE

## 2022-05-30 PROCEDURE — 85730 THROMBOPLASTIN TIME PARTIAL: CPT | Performed by: INTERNAL MEDICINE

## 2022-05-30 PROCEDURE — 99233 SBSQ HOSP IP/OBS HIGH 50: CPT | Performed by: INTERNAL MEDICINE

## 2022-05-30 RX ORDER — WARFARIN SODIUM 5 MG/1
5 TABLET ORAL
Status: COMPLETED | OUTPATIENT
Start: 2022-05-30 | End: 2022-05-30

## 2022-05-30 RX ORDER — POTASSIUM CHLORIDE 20 MEQ/1
40 TABLET, EXTENDED RELEASE ORAL 2 TIMES DAILY
Status: COMPLETED | OUTPATIENT
Start: 2022-05-30 | End: 2022-05-30

## 2022-05-30 RX ADMIN — AMLODIPINE BESYLATE 10 MG: 5 TABLET ORAL at 08:37

## 2022-05-30 RX ADMIN — PRAVASTATIN SODIUM 40 MG: 40 TABLET ORAL at 17:24

## 2022-05-30 RX ADMIN — HEPARIN SODIUM 4000 UNITS: 1000 INJECTION INTRAVENOUS; SUBCUTANEOUS at 23:39

## 2022-05-30 RX ADMIN — GABAPENTIN 300 MG: 300 CAPSULE ORAL at 21:34

## 2022-05-30 RX ADMIN — GABAPENTIN 100 MG: 100 CAPSULE ORAL at 08:37

## 2022-05-30 RX ADMIN — POTASSIUM CHLORIDE 40 MEQ: 1500 TABLET, EXTENDED RELEASE ORAL at 08:37

## 2022-05-30 RX ADMIN — METOPROLOL SUCCINATE 100 MG: 50 TABLET, EXTENDED RELEASE ORAL at 08:37

## 2022-05-30 RX ADMIN — DOXYCYCLINE 100 MG: 100 CAPSULE ORAL at 21:34

## 2022-05-30 RX ADMIN — SEVELAMER HYDROCHLORIDE 800 MG: 800 TABLET, FILM COATED PARENTERAL at 17:24

## 2022-05-30 RX ADMIN — PANTOPRAZOLE SODIUM 40 MG: 40 TABLET, DELAYED RELEASE ORAL at 17:24

## 2022-05-30 RX ADMIN — DOXAZOSIN 1 MG: 1 TABLET ORAL at 21:38

## 2022-05-30 RX ADMIN — SEVELAMER HYDROCHLORIDE 800 MG: 800 TABLET, FILM COATED PARENTERAL at 13:45

## 2022-05-30 RX ADMIN — SEVELAMER HYDROCHLORIDE 800 MG: 800 TABLET, FILM COATED PARENTERAL at 07:40

## 2022-05-30 RX ADMIN — HEPARIN SODIUM 9 UNITS/KG/HR: 10000 INJECTION, SOLUTION INTRAVENOUS at 17:31

## 2022-05-30 RX ADMIN — PANTOPRAZOLE SODIUM 40 MG: 40 TABLET, DELAYED RELEASE ORAL at 05:19

## 2022-05-30 RX ADMIN — DOCUSATE SODIUM 100 MG: 100 CAPSULE, LIQUID FILLED ORAL at 08:38

## 2022-05-30 RX ADMIN — WARFARIN SODIUM 5 MG: 5 TABLET ORAL at 17:24

## 2022-05-30 RX ADMIN — DULOXETINE 60 MG: 30 CAPSULE, DELAYED RELEASE ORAL at 08:37

## 2022-05-30 RX ADMIN — DOXYCYCLINE 100 MG: 100 CAPSULE ORAL at 08:38

## 2022-05-30 RX ADMIN — HEPARIN SODIUM 4000 UNITS: 1000 INJECTION INTRAVENOUS; SUBCUTANEOUS at 06:12

## 2022-05-30 RX ADMIN — TAMSULOSIN HYDROCHLORIDE 0.4 MG: 0.4 CAPSULE ORAL at 17:24

## 2022-05-30 RX ADMIN — POTASSIUM CHLORIDE 40 MEQ: 1500 TABLET, EXTENDED RELEASE ORAL at 17:24

## 2022-05-30 NOTE — ASSESSMENT & PLAN NOTE
Please approve or deny     Last Visit Date:  11/26/2018       Next Visit Date:    Visit date not found     CREATININE 1.1  0.7 - 1.5 mg/dl Final 11/24/2017  8:30 AM     Potassium 4.2  3.5 - 5.4 mmol/L Final 11/24/2017  8:30 AM · Patient has PAF  He denies palpitations  · Patient currently in  sinus rhythm  · Continue Toprol  · INR subtherapeutic but improving    · Continue IV heparin gtt to Coumadin bridge

## 2022-05-30 NOTE — ASSESSMENT & PLAN NOTE
· Patient has history of left lower extremity DVT after which he was diagnosed with factor 5 Leiden mutation  This happened years ago    · Held Coumadin prior to biopsy  · Continues on heparin drip to Coumadin bridge  · Goal INR 2-3  · Daily INR - as of this morning 1 80- hope to stop heparin in am if INR is therapeutic

## 2022-05-30 NOTE — ASSESSMENT & PLAN NOTE
· Continue Cymbalta-   · Frustrated with long hospital stay but has good eye contact and make some humorous remarks

## 2022-05-30 NOTE — PROGRESS NOTES
[place]  Progress Note - Kavita Magdaleno 1950, 71 y o  male MRN: [id number]  Unit/Bed#: -Yaya Encounter: [id number]  Primary Care Provider: Agustin Kidd MD   Date and time admitted to hospital: 5/10/2022  6:51 PM    Glomerulonephritis  Assessment & Plan  · Diagnosed during prior admission  · Postinfectious suspected  · PermCath placed 5/23-no urgent need for dialysis present  · Renal biopsy performed 5/26    Factor V Leiden mutation [facility])  Assessment & Plan  · Patient has history of left lower extremity DVT after which he was diagnosed with factor 5 Leiden mutation  This happened years ago  · Held Coumadin prior to biopsy  · Continues on heparin drip to Coumadin bridge  · Goal INR 2-3  · Daily INR - as of this morning 1 80- hope to stop heparin in am if INR is therapeutic    * JANEL (acute kidney injury) ([facility] [address] )  Assessment & Plan  · Patient admitted with acute kidney injury felt to be most likely due to post infectious  glomerulonephritis  · Was unable have kidney biopsy due to cervical collar not allowing him to lay on his stomach and uncontrolled blood pressure  · Neurosurgery okay with patient being prone as long as he can keep the cervical collar in place  · Nephrology discussed with IR who were agreeable to have patient last on his side for the procedure    · Dr Soledad Siegel recommending cervical collar remain in place for at least additional 2 weeks  · Renal biopsy performed 5/26  · Peak creatinine of 5 6-now at 4 78 today nephrology input appreciated-continue to trend    Chronic diastolic (congestive) heart failure [facility])  Assessment & Plan  Wt Readings from Last 3 Encounters:   05/30/22 102 kg (225 lb 1 6 oz)   05/03/22 114 kg (251 lb)   04/29/22 113 kg (250 lb 1 6 oz)     · Prior echo 04/25/2022:  EF 94%, Diastolic function: grade 2 pseudonormal relaxation  · Monitor I/O and daily weights  · Does not take outpatient diuretics    IV Lasix with minimal results during prior admission  Patient had received albumin to help with anasarca  · Fluid restriction, 2 g sodium    Anemia  Assessment & Plan  · Patient has chronic anemia  He denies signs of GI  bleeding  · Did have decreased to 6 5 overnight following renal biopsy foot now stable at 7 5 today-  · CT abdomen/pelvis without evidence of hematoma  · S/p 1 unit PRBCs 5/26  ·    Patient denies evidence of active bleeding  · Suspect renal function also contributing to persistent anemia of chronic disease state  ·   Trend CBC  · Iron panel showed elevated ferritin ,  normal iron and B12 level    Mixed hyperlipidemia  Assessment & Plan  · Continue Pravachol 40 mg daily    Paroxysmal A-fib (HCC)  Assessment & Plan  · Patient has PAF  He denies palpitations  · Patient currently in  sinus rhythm  · Continue Toprol  · INR subtherapeutic but improving  · Continue IV heparin gtt to Coumadin bridge    Essential hypertension  Assessment & Plan  · Home regimen:  Amlodipine 5 mg daily and metoprolol succinate 100 mg daily  · Amlodipine had been increased to 10 mg daily during this admission  ·  Hydralazine discontinued per Nephrology given low blood pressures    Surgical site infection  Assessment & Plan  · S/p cervical fusion performed by Dr Maria Teresa Bentley on 2/57/13, complicated by MRSA infection  · Completed 24 days of IV vancomycin, transitioned to daptomycin on 04/24-4/29    Discharged home on doxycycline 100 mg b i d    · Continue doxycycline  · Will need follow up with Neurosurgery      Depression, major, single episode, moderate ([address] )  Assessment & Plan  · Continue Cymbalta-   · Frustrated with long hospital stay but has good eye contact and make some humorous remarks     WILL (obstructive sleep apnea)  Assessment & Plan  · CPAP HS - patient brought home machine in         VTE Prophylaxis: in place    Patient Centered Rounds: I rounded with patient's nurse    Current Length of Stay: 20 day(s)    Current Patient Status: Inpatient    Certification Statement: Pt requires additional inpatient hospital stay due to: see assessment and plan        Subjective:     Patient without complaints of shortness of breath or pain today  All other ROS are negative    Objective:     Vitals:   Temp (24hrs), Av 5 °F (36 4 °C), Min:97 5 °F (36 4 °C), Max:97 6 °F (36 4 °C)    Temp:  [97 5 °F (36 4 °C)-97 6 °F (36 4 °C)] 97 6 °F (36 4 °C)  HR:  [59-64] 61  Resp:  [16-18] 16  BP: (152-161)/(74-82) 161/82  SpO2:  [95 %-99 %] 95 %  Body mass index is 31 4 kg/m²  Input and Output Summary (last 24 hours): Intake/Output Summary (Last 24 hours) at 2022 1353  Last data filed at 2022 1347  Gross per 24 hour   Intake 400 ml   Output 2675 ml   Net -2275 ml       Physical Exam:     Physical Exam  Vitals and nursing note reviewed  Constitutional:       General: He is not in acute distress  Eyes:      General:         Right eye: No discharge  Left eye: No discharge  Conjunctiva/sclera: Conjunctivae normal    Neck:      Comments: Cervical collar in place for previous neck surgery  Cardiovascular:      Rate and Rhythm: Normal rate and regular rhythm  Heart sounds:     No gallop  Pulmonary:      Breath sounds: No wheezing, rhonchi or rales  Abdominal:      General: There is no distension  Palpations: Abdomen is soft  Tenderness: There is no abdominal tenderness  Musculoskeletal:      Right lower leg: No edema  Left lower leg: No edema  Neurological:      Mental Status: He is alert  Psychiatric:         Mood and Affect: Mood normal          Thought Content: Thought content normal          Judgment: Judgment normal              I personally reviewed labs and imaging reports for today        Last 24 Hours Medication List:   Current Facility-Administered Medications   Medication Dose Route Frequency Provider Last Rate    acetaminophen  650 mg Oral Q6H PRN Corbin Plascencia PA-C      amLODIPine  10 mg Oral Daily Bernabe Clayton PA-C      docusate sodium  100 mg Oral Daily Melly Dick PA-C      doxazosin  1 mg Oral HS Norberto BullDEEPTHI      doxycycline hyclate  100 mg Oral Q12H [address] Melly Dick PA-C      DULoxetine  60 mg Oral Daily Melly Dick PA-C      gabapentin  100 mg Oral Daily Melly Dick PA-C      gabapentin  300 mg Oral HS Melly Dick PA-C      heparin (porcine)  3-30 Units/kg/hr (Order-Specific) Intravenous Titrated Melly Dick PA-C 11 Units/kg/hr (05/30/22 6288)    heparin (porcine)  4,000 Units Intravenous Q1H PRN Judsoni Toapoorvae, DO      heparin (porcine)  8,000 Units Intravenous Q1H PRN Judsoni Toapoorvae, DO      hydrALAZINE  10 mg Intravenous Q6H PRN Kandace Blackburn DO      lidocaine  1 patch Topical Daily PRN Annette Flores PA-C      lidocaine  2 patch Topical Daily Mak Farmer DO      methocarbamol  750 mg Oral Q6H PRN Melly Dick PA-C      metoprolol succinate  100 mg Oral Daily Melly Dick PA-C      ondansetron  4 mg Intravenous Q4H PRN Annette Flores PA-C      pantoprazole  40 mg Oral BID AC Jonelle Lou PA-C      potassium chloride  40 mEq Oral BID DEEPTHI Siu      pravastatin  40 mg Oral Daily With FABIOLA Nesbitt      senna  1 tablet Oral HS PRN Melly Dick PA-C      sevelamer  800 mg Oral TID With Meals Nikolai Marsh MD      tamsulosin  0 4 mg Oral Daily With Dinner Melly Dick PA-C      warfarin  2 5 mg Oral Once per day on Sun Mon Tue Thu Fri Marj GUZMAN PA-C      warfarin  5 mg Oral Once per day on Wed Sat Mariella Juarez PA-C            Today, Patient Was Seen By: Graciela Ness DO    ** Please Note: Dictation voice to text software may have been used in the creation of this document   **

## 2022-05-30 NOTE — ASSESSMENT & PLAN NOTE
Wt Readings from Last 3 Encounters:   05/30/22 102 kg (225 lb 1 6 oz)   05/03/22 114 kg (251 lb)   04/29/22 113 kg (250 lb 1 6 oz)     · Prior echo 04/25/2022:  EF 92%, Diastolic function: grade 2 pseudonormal relaxation  · Monitor I/O and daily weights  · Does not take outpatient diuretics  IV Lasix with minimal results during prior admission    Patient had received albumin to help with anasarca  · Fluid restriction, 2 g sodium

## 2022-05-30 NOTE — PROGRESS NOTES
Progress Note - Nephrology   Mukund Larkin 71 y o  male MRN: [id number]  Unit/Bed#: -01 Encounter: [id number]    ASSESSMENT and PLAN:  Acute kidney injury (POA):  Etiology: Suspect component of ATN as well as post infectious GN  Assessment/PLAN   Baseline creatinine previously 0 6-0 9   Admission creatinine 5 04   Current creatinine 4 78 slightly improving remains fairly stable   Status post renal biopsy on 05/26/2022-results pending   Renal ultrasound was negative for hydronephrosis   Serology goal workup was negative   Status post PermCath placement 5/23/2022-has not needed dialysis-site intact; likely will need to be discontinued prior to discharge if dialysis has not been needed   Clinically, patient is not uremic and there is no acute indication for renal replacement therapy    Avoid nephrotoxins, adjust meds to appropriate GFR   Optimize hemodynamic status to avoid delay in renal recovery   Await renal biopsy results    Nephrotic range proteinuria  Etiology:  Etiology unclear however possible FSGS versus membranous  Assessment and Plan:   Most recent UPCR 12 4 g   Status post renal biopsy 05/26/2022   Anti HUGH 2R from 5/22/2022--pending    Blood pressure/Hypertension:  Assessment and Plan:   Current blood pressure:  150s to 160s   Current medications include:  Amlodipine 10 mg daily, doxazosin 1 mg q h s , Toprol  mg daily   Maximize hemodynamics to maintain MAP >65   Avoid hypotension or fluctuations in blood pressure   Will continue to trend    Acute on chronic anemia  Assessment and Plan:   Current hemoglobin 7 5   Status post transfusions   Has adequate iron stores   Avoid EARLE therapy given contraindication secondary to factor five Leiden deficiency   Per primary team   Transfuse if hemoglobin less than 7 0    Hyperphosphatemia  Assessment and Plan:  · Remains on phosphorus binders with Renagel one tablet t i d   With meals (recently calcium containing binders were discontinued in the setting of hypercalcemia)  · Most recent phosphorus 6 0 on 05/24/2022  · Will repeat BMP in a m  Along with phosphorus     Hypercalcemia:  Improved  · Avoid calcium containing products  · Current corrected calcium is 10 0  · Continue to monitor    Hypokalemia:  Assessment and plan:  · Current potassium is 3 3  · Will treat with K-Dur 40 mEq b i d   · Patient status post K-Dur x1 and potassium remains stable at 3 3    Chronic diastolic heart failure:  Assessment and plan:  · Examining euvolemic on exam  · Most recent echo revealed EF of 55% with grade to pseudo normal relaxation  · Will continue monitor I/O, lab values volume status    Surgical site infection/MRSA:  Assessment plan:  · Previous cervical fusion on 06/78/8309 complicated by MRSA infection  · Currently on doxycycline  · Patient remains in cervical collar for an additional two weeks per Neurosurgery note    Factor five Leiden mutation:  With history of left lower extremity DVT  · Remains on heparin drip bridge to Coumadin  · Per primary team  · Avoid EARLE agents this is contraindicated      Review of systems  Patient seen and examined at bedside:  Patient continues to be frustrated with current situationReview of Systems   Constitutional: Negative  Negative for activity change, appetite change, chills, diaphoresis, fatigue and fever  HENT: Negative  Negative for congestion and facial swelling  Eyes: Negative  Respiratory: Negative  Cardiovascular: Negative  Gastrointestinal: Negative  Endocrine: Negative  Genitourinary: Negative  Musculoskeletal: Negative  Skin: Negative  Allergic/Immunologic: Negative  Neurological: Negative  Hematological: Negative  Psychiatric/Behavioral: Negative           Physical exam:  Current Weight: Weight - Scale: 102 kg (225 lb 1 6 oz)  Vitals:    05/29/22 1518 05/29/22 2211 05/30/22 0534 05/30/22 0744   BP: 152/74 159/80  161/82   Pulse: 59 64  61   Resp:  18  16 Temp: 97 5 °F (36 4 °C) 97 5 °F (36 4 °C)  97 6 °F (36 4 °C)   TempSrc:       SpO2: 99% 97%  95%   Weight:   102 kg (225 lb 1 6 oz)    Height:           Intake/Output Summary (Last 24 hours) at 5/30/2022 1004  Last data filed at 5/30/2022 0838  Gross per 24 hour   Intake 880 ml   Output 2400 ml   Net -1520 ml       Physical Exam  Constitutional:       Appearance: Normal appearance  Comments: NAD in cervical neck brace   HENT:      Head: Normocephalic and atraumatic  Nose: Nose normal       Mouth/Throat:      Mouth: Mucous membranes are moist       Pharynx: Oropharynx is clear  Eyes:      Extraocular Movements: Extraocular movements intact  Conjunctiva/sclera: Conjunctivae normal    Neck:      Comments: Cervical neck brace in place  Right perm cath site intact with dressing  Cardiovascular:      Rate and Rhythm: Normal rate and regular rhythm  Pulses: Normal pulses  Heart sounds: Normal heart sounds  Pulmonary:      Effort: Pulmonary effort is normal       Breath sounds: Normal breath sounds  Abdominal:      General: Bowel sounds are normal       Palpations: Abdomen is soft  Musculoskeletal:         General: Normal range of motion  Cervical back: Neck supple  Skin:     General: Skin is dry  Neurological:      General: No focal deficit present  Mental Status: He is alert and oriented to person, place, and time  Psychiatric:         Mood and Affect: Mood normal          Thought Content:  Thought content normal          Judgment: Judgment normal             Medications:    Current Facility-Administered Medications:     acetaminophen (TYLENOL) tablet 650 mg, 650 mg, Oral, Q6H PRN, Antonia Cotto PA-C, 650 mg at 05/28/22 2133    amLODIPine (NORVASC) tablet 10 mg, 10 mg, Oral, Daily, James Awad PA-C, 10 mg at 05/30/22 7631    docusate sodium (COLACE) capsule 100 mg, 100 mg, Oral, Daily, Jonelle Lou PA-C, 100 mg at 05/30/22 0838    doxazosin (CARDURA) tablet 1 mg, 1 mg, Oral, HS, Clementina Leiva, CRNP, 1 mg at 05/29/22 2227    doxycycline hyclate (VIBRAMYCIN) capsule 100 mg, 100 mg, Oral, Q12H [address], Jonelle Lou PA-C, 100 mg at 05/30/22 0838    DULoxetine (CYMBALTA) delayed release capsule 60 mg, 60 mg, Oral, Daily, Jonelle Lou PA-C, 60 mg at 05/30/22 9545    gabapentin (NEURONTIN) capsule 100 mg, 100 mg, Oral, Daily, Jonelle Lou PA-C, 100 mg at 05/30/22 2169    gabapentin (NEURONTIN) capsule 300 mg, 300 mg, Oral, HS, Jonelle Lou PA-C, 300 mg at 05/29/22 2227    heparin (porcine) 25,000 units in 0 45% NaCl 250 mL infusion (premix), 3-30 Units/kg/hr (Order-Specific), Intravenous, Titrated, Jonelle Lou PA-C, Last Rate: 11 mL/hr at 05/30/22 0614, 11 Units/kg/hr at 05/30/22 0614    heparin (porcine) injection 4,000 Units, 4,000 Units, Intravenous, Q1H PRN, Mak Farmer DO, 4,000 Units at 05/30/22 0612    heparin (porcine) injection 8,000 Units, 8,000 Units, Intravenous, Q1H PRN, Mak Farmer DO    hydrALAZINE (APRESOLINE) injection 10 mg, 10 mg, Intravenous, Q6H PRN, Alessandro Feliciano DO    lidocaine (LIDODERM) 5 % patch 1 patch, 1 patch, Topical, Daily PRN, Annette Flores PA-C, 1 patch at 05/17/22 0032    lidocaine (LIDODERM) 5 % patch 2 patch, 2 patch, Topical, Daily, Mak Farmer DO, 2 patch at 05/26/22 0947    methocarbamol (ROBAXIN) tablet 750 mg, 750 mg, Oral, Q6H PRN, Reanna Strong PA-C, 750 mg at 05/28/22 2133    metoprolol succinate (TOPROL-XL) 24 hr tablet 100 mg, 100 mg, Oral, Daily, Jonelle Lou PA-C, 100 mg at 05/30/22 0837    ondansetron (ZOFRAN) injection 4 mg, 4 mg, Intravenous, Q4H PRN, Annette Flores PA-C, 4 mg at 05/27/22 1045    pantoprazole (PROTONIX) EC tablet 40 mg, 40 mg, Oral, BID AC, Jonelle Lou PA-C, 40 mg at 05/30/22 0519    potassium chloride (K-DUR,KLOR-CON) CR tablet 40 mEq, 40 mEq, Oral, BID, DEEPTHI Diaz, 40 mEq at 05/30/22 0416    pravastatin (PRAVACHOL) tablet 40 mg, 40 mg, Oral, Daily With Dinner, Reanna Strong PA-C, 40 mg at 05/29/22 1637    senna (SENOKOT) tablet 8 6 mg, 1 tablet, Oral, HS PRN, Kiki Larios PA-C, 8 6 mg at 05/25/22 0915    sevelamer (RENAGEL) tablet 800 mg, 800 mg, Oral, TID With Meals, Candice Blanchard MD, 800 mg at 05/30/22 0740    tamsulosin (FLOMAX) capsule 0 4 mg, 0 4 mg, Oral, Daily With Dinner, Kiki Larios PA-C, 0 4 mg at 05/29/22 1640    warfarin (COUMADIN) tablet 2 5 mg, 2 5 mg, Oral, Once per day on Sun Mon Tue Thu Fri, Marj GUZMAN PA-C, 2 5 mg at 05/29/22 9915    warfarin (COUMADIN) tablet 5 mg, 5 mg, Oral, Once per day on Wed Sat, Marj Orozco V [place], 5 mg at 05/28/22 1729    Laboratory Results:  Results from last 7 days   Lab Units 05/30/22  0513 05/29/22  0536 05/28/22  1817 05/28/22  0313 05/27/22  1938 05/27/22  1015 05/27/22  0219 05/27/22  0049 05/26/22  0350 05/25/22  0455 05/24/22  0432   WBC Thousand/uL 5 54 5 28  --  5 33  --   --   --  5 21 5 10 5 04 5 82   HEMOGLOBIN g/dL 7 5* 8 1* 8 2* 7 1* 7 9* 7 7* 6 5* 6 4* 7 1* 7 5* 7 4*   HEMATOCRIT % 23 2* 25 5* 25 4* 23 0* 24 5* 24 2* 19 4* 20 2* 21 9* 23 8* 23 4*   PLATELETS Thousands/uL 151 134*  --  131*  --   --   --  136* 161 165 173   SODIUM mmol/L 135* 136  --  135*  --   --   --  136 136 138 139   POTASSIUM mmol/L 3 3* 3 3*  --  3 3*  --   --   --  3 6 3 5 3 6 3 5   CHLORIDE mmol/L 102 102  --  101  --   --   --  103 102 103 103   CO2 mmol/L 24 26  --  22  --   --   --  26 25 26 27   BUN mg/dL 57* 54*  --  58*  --   --   --  60* 60* 54* 50*   CREATININE mg/dL 4 78* 4 65*  --  4 72*  --   --   --  5 02* 5 60* 5 44* 5 46*   CALCIUM mg/dL 8 2* 8 2*  --  8 4  --   --   --  8 4 8 2* 8 5 8 4   MAGNESIUM mg/dL  --   --   --   --   --   --   --   --   --   --  1 5*   PHOSPHORUS mg/dL  --   --   --   --   --   --   --   --   --   --  6 0*

## 2022-05-30 NOTE — PLAN OF CARE
Problem: PAIN - ADULT  Goal: Verbalizes/displays adequate comfort level or baseline comfort level  Description: Interventions:  - Encourage patient to monitor pain and request assistance  - Assess pain using appropriate pain scale  - Administer analgesics based on type and severity of pain and evaluate response  - Implement non-pharmacological measures as appropriate and evaluate response  - Consider cultural and social influences on pain and pain management  - Notify physician/advanced practitioner if interventions unsuccessful or patient reports new pain  Outcome: Progressing     Problem: INFECTION - ADULT  Goal: Absence or prevention of progression during hospitalization  Description: INTERVENTIONS:  - Assess and monitor for signs and symptoms of infection  - Monitor lab/diagnostic results  - Monitor all insertion sites, i e  indwelling lines, tubes, and drains  - Monitor endotracheal if appropriate and nasal secretions for changes in amount and color  - [place] appropriate cooling/warming therapies per order  - Administer medications as ordered  - Instruct and encourage patient and family to use good hand hygiene technique  - Identify and instruct in appropriate isolation precautions for identified infection/condition  Outcome: Progressing     Problem: DISCHARGE PLANNING  Goal: Discharge to home or other facility with appropriate resources  Description: INTERVENTIONS:  - Identify barriers to discharge w/patient and caregiver  - Arrange for needed discharge resources and transportation as appropriate  - Identify discharge learning needs (meds, wound care, etc )  - Arrange for interpretive services to assist at discharge as needed  - Refer to Case Management Department for coordinating discharge planning if the patient needs post-hospital services based on physician/advanced practitioner order or complex needs related to functional status, cognitive ability, or social support system  Outcome: Progressing Problem: Knowledge Deficit  Goal: Patient/family/caregiver demonstrates understanding of disease process, treatment plan, medications, and discharge instructions  Description: Complete learning assessment and assess knowledge base  Interventions:  - Provide teaching at level of understanding  - Provide teaching via preferred learning methods  Outcome: Progressing     Problem: Prexisting or High Potential for Compromised Skin Integrity  Goal: Skin integrity is maintained or improved  Description: INTERVENTIONS:  - Identify patients at risk for skin breakdown  - Assess and monitor skin integrity  - Assess and monitor nutrition and hydration status  - Monitor labs   - Assess for incontinence   - Turn and reposition patient  - Assist with mobility/ambulation  - Relieve pressure over bony prominences  - Avoid friction and shearing  - Provide appropriate hygiene as needed including keeping skin clean and dry  - Evaluate need for skin moisturizer/barrier cream  - Collaborate with interdisciplinary team   - Patient/family teaching  - Consider wound care consult   Outcome: Progressing     Problem: Nutrition/Hydration-ADULT  Goal: Nutrient/Hydration intake appropriate for improving, restoring or maintaining nutritional needs  Description: Monitor and assess patient's nutrition/hydration status for malnutrition  Collaborate with interdisciplinary team and initiate plan and interventions as ordered  Monitor patient's weight and dietary intake as ordered or per policy  Utilize nutrition screening tool and intervene as necessary  Determine patient's food preferences and provide high-protein, high-caloric foods as appropriate       INTERVENTIONS:  - Monitor oral intake, urinary output, labs, and treatment plans  - Assess nutrition and hydration status and recommend course of action  - Evaluate amount of meals eaten  - Assist patient with eating if necessary   - Allow adequate time for meals  - Recommend/ encourage appropriate diets, oral nutritional supplements, and vitamin/mineral supplements  - Order, calculate, and assess calorie counts as needed  - Recommend, monitor, and adjust tube feedings and TPN/PPN based on assessed needs  - Assess need for intravenous fluids  - Provide specific nutrition/hydration education as appropriate  - Include patient/family/caregiver in decisions related to nutrition  Outcome: Progressing     Problem: METABOLIC, FLUID AND ELECTROLYTES - ADULT  Goal: Electrolytes maintained within normal limits  Description: INTERVENTIONS:  - Monitor labs and assess patient for signs and symptoms of electrolyte imbalances  - Administer electrolyte replacement as ordered  - Monitor response to electrolyte replacements, including repeat lab results as appropriate  - Instruct patient on fluid and nutrition as appropriate  Outcome: Progressing  Goal: Fluid balance maintained  Description: INTERVENTIONS:  - Monitor labs   - Monitor I/O and WT  - Instruct patient on fluid and nutrition as appropriate  - Assess for signs & symptoms of volume excess or deficit  Outcome: Progressing     Problem: SKIN/TISSUE INTEGRITY - ADULT  Goal: Skin Integrity remains intact(Skin Breakdown Prevention)  Description: Assess:  -Perform Matt assessment every shift  -Clean and moisturize skin every day  -Inspect skin when repositioning, toileting, and assisting with ADLS  -Assess under medical devices such as bracelets every shift  -Assess extremities for adequate circulation and sensation     Bed Management:  -Have minimal linens on bed & keep smooth, unwrinkled  -Change linens as needed when moist or perspiring  -Avoid sitting or lying in one position for more than 2 hours while in bed  -Keep HOB at 30 degrees     Toileting:  -Offer bedside commode  -Assess for incontinence every 2 hours  -Use incontinent care products after each incontinent episode such as barrier cream    Activity:  -Mobilize patient 3 times a day  -Encourage activity and walks on unit  -Encourage or provide ROM exercises   -Turn and reposition patient every 2 Hours  -Use appropriate equipment to lift or move patient in bed  -Instruct/ Assist with weight shifting every 3 hours when out of bed in chair  -Consider limitation of chair time 3 hour intervals    Skin Care:  -Avoid use of baby powder, tape, friction and shearing, hot water or constrictive clothing  -Relieve pressure over bony prominences using allevyn  -Do not massage red bony areas    Next Steps:  -Teach patient strategies to minimize risks such as shifting weight   -Consider consults to  interdisciplinary teams such as wound care  Outcome: Progressing  Goal: Incision(s), wounds(s) or drain site(s) healing without S/S of infection  Description: INTERVENTIONS  - Assess and document dressing, incision, wound bed, drain sites and surrounding tissue  - Provide patient and family education  - Perform skin care/dressing changes every day  Outcome: Progressing     Problem: HEMATOLOGIC - ADULT  Goal: Maintains hematologic stability  Description: INTERVENTIONS  - Assess for signs and symptoms of bleeding or hemorrhage  - Monitor labs  - Administer supportive blood products/factors as ordered and appropriate  Outcome: Progressing     Problem: MUSCULOSKELETAL - ADULT  Goal: Maintain or return mobility to safest level of function  Description: INTERVENTIONS:  - Assess patient's ability to carry out ADLs; assess patient's baseline for ADL function and identify physical deficits which impact ability to perform ADLs (bathing, care of mouth/teeth, toileting, grooming, dressing, etc )  - Assess/evaluate cause of self-care deficits   - Assess range of motion  - Assess patient's mobility  - Assess patient's need for assistive devices and provide as appropriate  - Encourage maximum independence but intervene and supervise when necessary  - Involve family in performance of ADLs  - Assess for home care needs following discharge   - Consider OT consult to assist with ADL evaluation and planning for discharge  - Provide patient education as appropriate  Outcome: Progressing     Problem: MOBILITY - ADULT  Goal: Maintain or return to baseline ADL function  Description: INTERVENTIONS:  -  Assess patient's ability to carry out ADLs; assess patient's baseline for ADL function and identify physical deficits which impact ability to perform ADLs (bathing, care of mouth/teeth, toileting, grooming, dressing, etc )  - Assess/evaluate cause of self-care deficits   - Assess range of motion  - Assess patient's mobility; develop plan if impaired  - Assess patient's need for assistive devices and provide as appropriate  - Encourage maximum independence but intervene and supervise when necessary  - Involve family in performance of ADLs  - Assess for home care needs following discharge   - Consider OT consult to assist with ADL evaluation and planning for discharge  - Provide patient education as appropriate  Outcome: Progressing  Goal: Maintains/Returns to pre admission functional level  Description: INTERVENTIONS:  - Perform BMAT or MOVE assessment daily    - Set and communicate daily mobility goal to care team and patient/family/caregiver  - Collaborate with rehabilitation services on mobility goals if consulted  - Perform Range of Motion 5 times a day  - Reposition patient every 3 hours    - Dangle patient 3 times a day  - Stand patient 3 times a day  - Ambulate patient 3 times a day  - Out of bed to chair 3 times a day   - Out of bed for meals 3 times a day  - Out of bed for toileting  - Record patient progress and toleration of activity level   Outcome: Progressing     Problem: SAFETY ADULT  Goal: Maintain or return to baseline ADL function  Description: INTERVENTIONS:  -  Assess patient's ability to carry out ADLs; assess patient's baseline for ADL function and identify physical deficits which impact ability to perform ADLs (bathing, care of mouth/teeth, toileting, grooming, dressing, etc )  - Assess/evaluate cause of self-care deficits   - Assess range of motion  - Assess patient's mobility; develop plan if impaired  - Assess patient's need for assistive devices and provide as appropriate  - Encourage maximum independence but intervene and supervise when necessary  - Involve family in performance of ADLs  - Assess for home care needs following discharge   - Consider OT consult to assist with ADL evaluation and planning for discharge  - Provide patient education as appropriate  Outcome: Progressing  Goal: Maintains/Returns to pre admission functional level  Description: INTERVENTIONS:  - Perform BMAT or MOVE assessment daily    - Set and communicate daily mobility goal to care team and patient/family/caregiver  - Collaborate with rehabilitation services on mobility goals if consulted  - Perform Range of Motion 5 times a day  - Reposition patient every 3 hours    - Dangle patient 3 times a day  - Stand patient 3 times a day  - Ambulate patient 3 times a day  - Out of bed to chair 3 times a day   - Out of bed for meals 3 times a day  - Out of bed for toileting  - Record patient progress and toleration of activity level   Outcome: Progressing  Goal: Patient will remain free of falls  Description: INTERVENTIONS:  - Educate patient/family on patient safety including physical limitations  - Instruct patient to call for assistance with activity   - Consult OT/PT to assist with strengthening/mobility   - Keep Call bell within reach  - Keep bed low and locked with side rails adjusted as appropriate  - Keep care items and personal belongings within reach  - Initiate and maintain comfort rounds  - Make Fall Risk Sign visible to staff  - Offer Toileting every 2 Hours, in advance of need  - Initiate/Maintain bed alarm  - Obtain necessary fall risk management equipment: non slip socks  - Apply yellow socks and bracelet for high fall risk patients  - Consider moving patient to room near nurses station  Outcome: Progressing

## 2022-05-30 NOTE — ASSESSMENT & PLAN NOTE
· Potassium 2 8 on admission  · Then given 40 mEq p o  potassium and 20 mEq IV potassium -dropped again today to 3 3-on oral supplement  · On telemetry   · Continue to monitor and replete as needed

## 2022-05-30 NOTE — ASSESSMENT & PLAN NOTE
· Patient admitted with acute kidney injury felt to be most likely due to post infectious  glomerulonephritis  · Was unable have kidney biopsy due to cervical collar not allowing him to lay on his stomach and uncontrolled blood pressure    · Neurosurgery okay with patient being prone as long as he can keep the cervical collar in place  · Nephrology discussed with IR who were agreeable to have patient last on his side for the procedure    · Dr Darylene Doss recommending cervical collar remain in place for at least additional 2 weeks  · Renal biopsy performed 5/26  · Peak creatinine of 5 6-now at 4 78 today nephrology input appreciated-continue to trend

## 2022-05-30 NOTE — ASSESSMENT & PLAN NOTE
· S/p cervical fusion performed by Dr Andres Noyola on 5/56/66, complicated by MRSA infection  · Completed 24 days of IV vancomycin, transitioned to daptomycin on 04/24-4/29    Discharged home on doxycycline 100 mg b i d    · Continue doxycycline  · Will need follow up with Neurosurgery

## 2022-05-30 NOTE — ASSESSMENT & PLAN NOTE
· Home regimen:  Amlodipine 5 mg daily and metoprolol succinate 100 mg daily  · Amlodipine had been increased to 10 mg daily during this admission  ·  Hydralazine discontinued per Nephrology given low blood pressures

## 2022-05-30 NOTE — PLAN OF CARE
Problem: INFECTION - ADULT  Goal: Absence or prevention of progression during hospitalization  Description: INTERVENTIONS:  - Assess and monitor for signs and symptoms of infection  - Monitor lab/diagnostic results  - Monitor all insertion sites, i e  indwelling lines, tubes, and drains  - Monitor endotracheal if appropriate and nasal secretions for changes in amount and color  - [place] appropriate cooling/warming therapies per order  - Administer medications as ordered  - Instruct and encourage patient and family to use good hand hygiene technique  - Identify and instruct in appropriate isolation precautions for identified infection/condition  Outcome: Progressing

## 2022-05-30 NOTE — ASSESSMENT & PLAN NOTE
· Diagnosed during prior admission  · Postinfectious suspected  · PermCath placed 5/23-no urgent need for dialysis present  · Renal biopsy performed 5/26

## 2022-05-30 NOTE — ASSESSMENT & PLAN NOTE
· Patient has chronic anemia  He denies signs of GI  bleeding      · Did have decreased to 6 5 overnight following renal biopsy foot now stable at 7 5 today-  · CT abdomen/pelvis without evidence of hematoma  · S/p 1 unit PRBCs 5/26  ·    Patient denies evidence of active bleeding  · Suspect renal function also contributing to persistent anemia of chronic disease state  ·   Trend CBC  · Iron panel showed elevated ferritin ,  normal iron and B12 level

## 2022-05-31 ENCOUNTER — TELEPHONE (OUTPATIENT)
Dept: UROLOGY | Facility: HOSPITAL | Age: 72
End: 2022-05-31

## 2022-05-31 LAB
ANION GAP SERPL CALCULATED.3IONS-SCNC: 10 MMOL/L (ref 4–13)
APTT PPP: 61 SECONDS (ref 23–37)
APTT PPP: >210 SECONDS (ref 23–37)
BUN SERPL-MCNC: 52 MG/DL (ref 5–25)
CALCIUM SERPL-MCNC: 8.2 MG/DL (ref 8.3–10.1)
CHLORIDE SERPL-SCNC: 103 MMOL/L (ref 100–108)
CO2 SERPL-SCNC: 23 MMOL/L (ref 21–32)
CREAT SERPL-MCNC: 4.61 MG/DL (ref 0.6–1.3)
GFR SERPL CREATININE-BSD FRML MDRD: 11 ML/MIN/1.73SQ M
GLUCOSE SERPL-MCNC: 87 MG/DL (ref 65–140)
INR PPP: 2.19 (ref 0.84–1.19)
MAGNESIUM SERPL-MCNC: 1.3 MG/DL (ref 1.6–2.6)
PHOSPHATE SERPL-MCNC: 4.5 MG/DL (ref 2.3–4.1)
POTASSIUM SERPL-SCNC: 3.7 MMOL/L (ref 3.5–5.3)
PROTHROMBIN TIME: 23.8 SECONDS (ref 11.6–14.5)
SODIUM SERPL-SCNC: 136 MMOL/L (ref 136–145)

## 2022-05-31 PROCEDURE — 85610 PROTHROMBIN TIME: CPT | Performed by: INTERNAL MEDICINE

## 2022-05-31 PROCEDURE — 99232 SBSQ HOSP IP/OBS MODERATE 35: CPT | Performed by: INTERNAL MEDICINE

## 2022-05-31 PROCEDURE — 80048 BASIC METABOLIC PNL TOTAL CA: CPT | Performed by: NURSE PRACTITIONER

## 2022-05-31 PROCEDURE — 99233 SBSQ HOSP IP/OBS HIGH 50: CPT | Performed by: INTERNAL MEDICINE

## 2022-05-31 PROCEDURE — 84100 ASSAY OF PHOSPHORUS: CPT | Performed by: NURSE PRACTITIONER

## 2022-05-31 PROCEDURE — 85730 THROMBOPLASTIN TIME PARTIAL: CPT | Performed by: INTERNAL MEDICINE

## 2022-05-31 PROCEDURE — 83735 ASSAY OF MAGNESIUM: CPT | Performed by: NURSE PRACTITIONER

## 2022-05-31 RX ORDER — MAGNESIUM SULFATE HEPTAHYDRATE 40 MG/ML
4 INJECTION, SOLUTION INTRAVENOUS ONCE
Status: COMPLETED | OUTPATIENT
Start: 2022-05-31 | End: 2022-05-31

## 2022-05-31 RX ORDER — WARFARIN SODIUM 2.5 MG/1
2.5 TABLET ORAL
Status: COMPLETED | OUTPATIENT
Start: 2022-05-31 | End: 2022-05-31

## 2022-05-31 RX ADMIN — SEVELAMER HYDROCHLORIDE 800 MG: 800 TABLET, FILM COATED PARENTERAL at 13:22

## 2022-05-31 RX ADMIN — PRAVASTATIN SODIUM 40 MG: 40 TABLET ORAL at 16:35

## 2022-05-31 RX ADMIN — METOPROLOL SUCCINATE 100 MG: 50 TABLET, EXTENDED RELEASE ORAL at 08:21

## 2022-05-31 RX ADMIN — DOXYCYCLINE 100 MG: 100 CAPSULE ORAL at 08:21

## 2022-05-31 RX ADMIN — WARFARIN SODIUM 2.5 MG: 2.5 TABLET ORAL at 17:22

## 2022-05-31 RX ADMIN — MAGNESIUM OXIDE TAB 400 MG (241.3 MG ELEMENTAL MG) 400 MG: 400 (241.3 MG) TAB at 17:22

## 2022-05-31 RX ADMIN — MAGNESIUM OXIDE TAB 400 MG (241.3 MG ELEMENTAL MG) 400 MG: 400 (241.3 MG) TAB at 08:21

## 2022-05-31 RX ADMIN — PANTOPRAZOLE SODIUM 40 MG: 40 TABLET, DELAYED RELEASE ORAL at 16:35

## 2022-05-31 RX ADMIN — DULOXETINE 60 MG: 30 CAPSULE, DELAYED RELEASE ORAL at 08:21

## 2022-05-31 RX ADMIN — MAGNESIUM SULFATE HEPTAHYDRATE 4 G: 40 INJECTION, SOLUTION INTRAVENOUS at 13:22

## 2022-05-31 RX ADMIN — GABAPENTIN 100 MG: 100 CAPSULE ORAL at 08:21

## 2022-05-31 RX ADMIN — AMLODIPINE BESYLATE 10 MG: 5 TABLET ORAL at 08:21

## 2022-05-31 RX ADMIN — PANTOPRAZOLE SODIUM 40 MG: 40 TABLET, DELAYED RELEASE ORAL at 06:10

## 2022-05-31 RX ADMIN — SEVELAMER HYDROCHLORIDE 800 MG: 800 TABLET, FILM COATED PARENTERAL at 16:35

## 2022-05-31 RX ADMIN — GABAPENTIN 300 MG: 300 CAPSULE ORAL at 22:47

## 2022-05-31 RX ADMIN — SEVELAMER HYDROCHLORIDE 800 MG: 800 TABLET, FILM COATED PARENTERAL at 07:43

## 2022-05-31 RX ADMIN — DOXYCYCLINE 100 MG: 100 CAPSULE ORAL at 22:47

## 2022-05-31 RX ADMIN — TAMSULOSIN HYDROCHLORIDE 0.4 MG: 0.4 CAPSULE ORAL at 16:35

## 2022-05-31 RX ADMIN — DOXAZOSIN 1 MG: 1 TABLET ORAL at 22:47

## 2022-05-31 NOTE — ASSESSMENT & PLAN NOTE
· S/p cervical fusion performed by Dr Ebony Mendoza on 0/21/97, complicated by MRSA infection  · Completed 24 days of IV vancomycin, transitioned to daptomycin on 04/24-4/29    Discharged home on doxycycline 100 mg b i d    · Continue doxycycline  · Will need follow up with Neurosurgery as an outpatient-

## 2022-05-31 NOTE — ASSESSMENT & PLAN NOTE
· Patient has PAF  He denies palpitations    · Patient currently in  sinus rhythm-had prior ablation for a flutter according to old chart  · Continue Toprol  ·  Now converted back to usual dosing of his warfarin

## 2022-05-31 NOTE — ASSESSMENT & PLAN NOTE
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

## 2022-05-31 NOTE — ASSESSMENT & PLAN NOTE
· Home regimen:  Amlodipine 5 mg daily and metoprolol succinate 100 mg daily  · Amlodipine had been increased to 10 mg daily during this admission  ·  Hydralazine discontinued per Nephrology given low blood pressures  · Blood pressure stable 140/75 today

## 2022-05-31 NOTE — PLAN OF CARE
Problem: PAIN - ADULT  Goal: Verbalizes/displays adequate comfort level or baseline comfort level  Description: Interventions:  - Encourage patient to monitor pain and request assistance  - Assess pain using appropriate pain scale  - Administer analgesics based on type and severity of pain and evaluate response  - Implement non-pharmacological measures as appropriate and evaluate response  - Consider cultural and social influences on pain and pain management  - Notify physician/advanced practitioner if interventions unsuccessful or patient reports new pain  Outcome: Progressing     Problem: INFECTION - ADULT  Goal: Absence or prevention of progression during hospitalization  Description: INTERVENTIONS:  - Assess and monitor for signs and symptoms of infection  - Monitor lab/diagnostic results  - Monitor all insertion sites, i e  indwelling lines, tubes, and drains  - Monitor endotracheal if appropriate and nasal secretions for changes in amount and color  - Lester appropriate cooling/warming therapies per order  - Administer medications as ordered  - Instruct and encourage patient and family to use good hand hygiene technique  - Identify and instruct in appropriate isolation precautions for identified infection/condition  Outcome: Progressing     Problem: DISCHARGE PLANNING  Goal: Discharge to home or other facility with appropriate resources  Description: INTERVENTIONS:  - Identify barriers to discharge w/patient and caregiver  - Arrange for needed discharge resources and transportation as appropriate  - Identify discharge learning needs (meds, wound care, etc )  - Arrange for interpretive services to assist at discharge as needed  - Refer to Case Management Department for coordinating discharge planning if the patient needs post-hospital services based on physician/advanced practitioner order or complex needs related to functional status, cognitive ability, or social support system  Outcome: Progressing Problem: Knowledge Deficit  Goal: Patient/family/caregiver demonstrates understanding of disease process, treatment plan, medications, and discharge instructions  Description: Complete learning assessment and assess knowledge base  Interventions:  - Provide teaching at level of understanding  - Provide teaching via preferred learning methods  Outcome: Progressing     Problem: Prexisting or High Potential for Compromised Skin Integrity  Goal: Skin integrity is maintained or improved  Description: INTERVENTIONS:  - Identify patients at risk for skin breakdown  - Assess and monitor skin integrity  - Assess and monitor nutrition and hydration status  - Monitor labs   - Assess for incontinence   - Turn and reposition patient  - Assist with mobility/ambulation  - Relieve pressure over bony prominences  - Avoid friction and shearing  - Provide appropriate hygiene as needed including keeping skin clean and dry  - Evaluate need for skin moisturizer/barrier cream  - Collaborate with interdisciplinary team   - Patient/family teaching  - Consider wound care consult   Outcome: Progressing     Problem: Nutrition/Hydration-ADULT  Goal: Nutrient/Hydration intake appropriate for improving, restoring or maintaining nutritional needs  Description: Monitor and assess patient's nutrition/hydration status for malnutrition  Collaborate with interdisciplinary team and initiate plan and interventions as ordered  Monitor patient's weight and dietary intake as ordered or per policy  Utilize nutrition screening tool and intervene as necessary  Determine patient's food preferences and provide high-protein, high-caloric foods as appropriate       INTERVENTIONS:  - Monitor oral intake, urinary output, labs, and treatment plans  - Assess nutrition and hydration status and recommend course of action  - Evaluate amount of meals eaten  - Assist patient with eating if necessary   - Allow adequate time for meals  - Recommend/ encourage appropriate diets, oral nutritional supplements, and vitamin/mineral supplements  - Order, calculate, and assess calorie counts as needed  - Recommend, monitor, and adjust tube feedings and TPN/PPN based on assessed needs  - Assess need for intravenous fluids  - Provide specific nutrition/hydration education as appropriate  - Include patient/family/caregiver in decisions related to nutrition  Outcome: Progressing     Problem: METABOLIC, FLUID AND ELECTROLYTES - ADULT  Goal: Electrolytes maintained within normal limits  Description: INTERVENTIONS:  - Monitor labs and assess patient for signs and symptoms of electrolyte imbalances  - Administer electrolyte replacement as ordered  - Monitor response to electrolyte replacements, including repeat lab results as appropriate  - Instruct patient on fluid and nutrition as appropriate  Outcome: Progressing  Goal: Fluid balance maintained  Description: INTERVENTIONS:  - Monitor labs   - Monitor I/O and WT  - Instruct patient on fluid and nutrition as appropriate  - Assess for signs & symptoms of volume excess or deficit  Outcome: Progressing     Problem: SKIN/TISSUE INTEGRITY - ADULT  Goal: Skin Integrity remains intact(Skin Breakdown Prevention)  Description: Assess:  -Perform Matt assessment every shift  -Clean and moisturize skin every day  -Inspect skin when repositioning, toileting, and assisting with ADLS  -Assess under medical devices such as bracelets every shift  -Assess extremities for adequate circulation and sensation     Bed Management:  -Have minimal linens on bed & keep smooth, unwrinkled  -Change linens as needed when moist or perspiring  -Avoid sitting or lying in one position for more than 2 hours while in bed  -Keep HOB at 30 degrees     Toileting:  -Offer bedside commode  -Assess for incontinence every 2 hours  -Use incontinent care products after each incontinent episode such as barrier cream    Activity:  -Mobilize patient 3 times a day  -Encourage activity and walks on unit  -Encourage or provide ROM exercises   -Turn and reposition patient every 2 Hours  -Use appropriate equipment to lift or move patient in bed  -Instruct/ Assist with weight shifting every 3 hours when out of bed in chair  -Consider limitation of chair time 3 hour intervals    Skin Care:  -Avoid use of baby powder, tape, friction and shearing, hot water or constrictive clothing  -Relieve pressure over bony prominences using allevyn  -Do not massage red bony areas    Next Steps:  -Teach patient strategies to minimize risks such as shifting weight   -Consider consults to  interdisciplinary teams such as wound care  Outcome: Progressing  Goal: Incision(s), wounds(s) or drain site(s) healing without S/S of infection  Description: INTERVENTIONS  - Assess and document dressing, incision, wound bed, drain sites and surrounding tissue  - Provide patient and family education  - Perform skin care/dressing changes every day  Outcome: Progressing     Problem: HEMATOLOGIC - ADULT  Goal: Maintains hematologic stability  Description: INTERVENTIONS  - Assess for signs and symptoms of bleeding or hemorrhage  - Monitor labs  - Administer supportive blood products/factors as ordered and appropriate  Outcome: Progressing     Problem: MUSCULOSKELETAL - ADULT  Goal: Maintain or return mobility to safest level of function  Description: INTERVENTIONS:  - Assess patient's ability to carry out ADLs; assess patient's baseline for ADL function and identify physical deficits which impact ability to perform ADLs (bathing, care of mouth/teeth, toileting, grooming, dressing, etc )  - Assess/evaluate cause of self-care deficits   - Assess range of motion  - Assess patient's mobility  - Assess patient's need for assistive devices and provide as appropriate  - Encourage maximum independence but intervene and supervise when necessary  - Involve family in performance of ADLs  - Assess for home care needs following discharge   - Consider OT consult to assist with ADL evaluation and planning for discharge  - Provide patient education as appropriate  Outcome: Progressing     Problem: MOBILITY - ADULT  Goal: Maintain or return to baseline ADL function  Description: INTERVENTIONS:  -  Assess patient's ability to carry out ADLs; assess patient's baseline for ADL function and identify physical deficits which impact ability to perform ADLs (bathing, care of mouth/teeth, toileting, grooming, dressing, etc )  - Assess/evaluate cause of self-care deficits   - Assess range of motion  - Assess patient's mobility; develop plan if impaired  - Assess patient's need for assistive devices and provide as appropriate  - Encourage maximum independence but intervene and supervise when necessary  - Involve family in performance of ADLs  - Assess for home care needs following discharge   - Consider OT consult to assist with ADL evaluation and planning for discharge  - Provide patient education as appropriate  Outcome: Progressing  Goal: Maintains/Returns to pre admission functional level  Description: INTERVENTIONS:  - Perform BMAT or MOVE assessment daily    - Set and communicate daily mobility goal to care team and patient/family/caregiver  - Collaborate with rehabilitation services on mobility goals if consulted  - Perform Range of Motion 5 times a day  - Reposition patient every 3 hours    - Dangle patient 3 times a day  - Stand patient 3 times a day  - Ambulate patient 3 times a day  - Out of bed to chair 3 times a day   - Out of bed for meals 3 times a day  - Out of bed for toileting  - Record patient progress and toleration of activity level   Outcome: Progressing     Problem: SAFETY ADULT  Goal: Maintain or return to baseline ADL function  Description: INTERVENTIONS:  -  Assess patient's ability to carry out ADLs; assess patient's baseline for ADL function and identify physical deficits which impact ability to perform ADLs (bathing, care of mouth/teeth, toileting, grooming, dressing, etc )  - Assess/evaluate cause of self-care deficits   - Assess range of motion  - Assess patient's mobility; develop plan if impaired  - Assess patient's need for assistive devices and provide as appropriate  - Encourage maximum independence but intervene and supervise when necessary  - Involve family in performance of ADLs  - Assess for home care needs following discharge   - Consider OT consult to assist with ADL evaluation and planning for discharge  - Provide patient education as appropriate  Outcome: Progressing  Goal: Maintains/Returns to pre admission functional level  Description: INTERVENTIONS:  - Perform BMAT or MOVE assessment daily    - Set and communicate daily mobility goal to care team and patient/family/caregiver  - Collaborate with rehabilitation services on mobility goals if consulted  - Perform Range of Motion 5 times a day  - Reposition patient every 3 hours    - Dangle patient 3 times a day  - Stand patient 3 times a day  - Ambulate patient 3 times a day  - Out of bed to chair 3 times a day   - Out of bed for meals 3 times a day  - Out of bed for toileting  - Record patient progress and toleration of activity level   Outcome: Progressing  Goal: Patient will remain free of falls  Description: INTERVENTIONS:  - Educate patient/family on patient safety including physical limitations  - Instruct patient to call for assistance with activity   - Consult OT/PT to assist with strengthening/mobility   - Keep Call bell within reach  - Keep bed low and locked with side rails adjusted as appropriate  - Keep care items and personal belongings within reach  - Initiate and maintain comfort rounds  - Make Fall Risk Sign visible to staff  - Offer Toileting every 2 Hours, in advance of need  - Initiate/Maintain bed alarm  - Obtain necessary fall risk management equipment: non slip socks  - Apply yellow socks and bracelet for high fall risk patients  - Consider moving patient to room near nurses station  Outcome: Progressing

## 2022-05-31 NOTE — ASSESSMENT & PLAN NOTE
· Patient admitted with acute kidney injury felt to be most likely due to post infectious  glomerulonephritis    ·  Dr Fabrice Drummond recommending cervical collar remain in place for at least additional 2 weeks  · Renal biopsy performed 5/26-results are still pending  · Peak creatinine of 5 6-now at 4 61 today   · nephrology input appreciated-continue to trend  · If continued improvement may wish to consider for discharge tomorrow

## 2022-05-31 NOTE — ASSESSMENT & PLAN NOTE
· Continue Cymbalta-   · Frustrated with long hospital stay but  does not appear to be severely depressed

## 2022-05-31 NOTE — PROGRESS NOTES
[place]  Progress Note - Paulette Olmedo 1950, 71 y o  male MRN: [id number]  Unit/Bed#: -01 Encounter: [id number]  Primary Care Provider: David Ortiz MD   Date and time admitted to hospital: 5/10/2022  6:51 PM    Glomerulonephritis  Assessment & Plan  · Diagnosed during prior admission  · Postinfectious suspected  · PermCath placed 5/23-no urgent need for dialysis present-likely will remove this prior to discharge but await final determination by Nephrology team  · Renal biopsy performed 5/26    Factor V Leiden mutation [facility])  Assessment & Plan  · Patient has history of left lower extremity DVT after which he was diagnosed with factor 5 Leiden mutation  This happened years ago  · Held Coumadin prior to biopsy  ·  Will DC heparin as he is now therapeutic-will give 2 5 mg dose today and recheck INR in the a m   · Goal INR 2-3  · Daily INR -      * JANEL (acute kidney injury) ([facility] )  Assessment & Plan  · Patient admitted with acute kidney injury felt to be most likely due to post infectious  glomerulonephritis  ·  Dr Pelon Gates recommending cervical collar remain in place for at least additional 2 weeks  · Renal biopsy performed 5/26-results are still pending  · Peak creatinine of 5 6-now at 4 61 today   · nephrology input appreciated-continue to trend  · If continued improvement may wish to consider for discharge tomorrow    Hypokalemia  Assessment & Plan  · Potassium 2 8 on admission  · Then given 40 mEq p o  potassium and 20 mEq IV potassium -dropped again today to 3 3-on oral supplement  · On telemetry   · Continue to monitor and replete as needed    Mixed hyperlipidemia  Assessment & Plan  · Continue Pravachol 40 mg daily    Paroxysmal A-fib ([facility] )  Assessment & Plan  · Patient has PAF  He denies palpitations    · Patient currently in  sinus rhythm-had prior ablation for a flutter according to old chart  · Continue Toprol  ·  Now converted back to usual dosing of his warfarin    Essential hypertension  Assessment & Plan  · Home regimen:  Amlodipine 5 mg daily and metoprolol succinate 100 mg daily  · Amlodipine had been increased to 10 mg daily during this admission  ·  Hydralazine discontinued per Nephrology given low blood pressures  · Blood pressure stable 140/75 today    Surgical site infection  Assessment & Plan  · S/p cervical fusion performed by Dr Nancy Lee on , complicated by MRSA infection  · Completed 24 days of IV vancomycin, transitioned to daptomycin on -  Discharged home on doxycycline 100 mg b i d    · Continue doxycycline  · Will need follow up with Neurosurgery as an outpatient-      Depression, major, single episode, moderate ([address] )  Assessment & Plan  · Continue Cymbalta-   · Frustrated with long hospital stay but  does not appear to be severely depressed    WILL (obstructive sleep apnea)  Assessment & Plan  · CPAP HS - patient brought home machine in and tolerating well           VTE Prophylaxis: in place    Patient Centered Rounds: I rounded with patient's nurse    Current Length of Stay: 21 day(s)    Current Patient Status: Inpatient    Certification Statement: Pt requires additional inpatient hospital stay due to: see assessment and plan        Subjective:   Patient without complaints of shortness of breath or palpitations  Denies headache or dizziness  No nausea or vomiting  Denies pruritus    All other ROS are negative    Objective:     Vitals:   Temp (24hrs), Av °F (36 7 °C), Min:97 4 °F (36 3 °C), Max:98 7 °F (37 1 °C)    Temp:  [97 4 °F (36 3 °C)-98 7 °F (37 1 °C)] 97 4 °F (36 3 °C)  HR:  [58-66] 61  Resp:  [14-19] 16  BP: (145-166)/(75-83) 147/75  SpO2:  [96 %-99 %] 98 %  Body mass index is 31 26 kg/m²  Input and Output Summary (last 24 hours):        Intake/Output Summary (Last 24 hours) at 2022 1314  Last data filed at 2022 1100  Gross per 24 hour   Intake 1555 ml   Output 1825 ml   Net -270 ml       Physical Exam:     Physical Exam  Vitals and nursing note reviewed  Constitutional:       General: He is not in acute distress  HENT:      Head: Normocephalic  Mouth/Throat:      Mouth: Mucous membranes are moist    Eyes:      General: No scleral icterus  Right eye: No discharge  Left eye: No discharge  Conjunctiva/sclera: Conjunctivae normal    Neck:      Comments: Cervical collar in place for prior cervical surgery  Cardiovascular:      Rate and Rhythm: Normal rate and regular rhythm  Heart sounds:     No gallop  Pulmonary:      Breath sounds: No wheezing, rhonchi or rales  Abdominal:      General: There is no distension  Palpations: Abdomen is soft  Tenderness: There is no abdominal tenderness  Musculoskeletal:      Right lower leg: No edema  Left lower leg: No edema  Skin:     Coloration: Skin is not jaundiced  Neurological:      General: No focal deficit present  Mental Status: He is alert and oriented to person, place, and time  Psychiatric:         Mood and Affect: Mood normal          Thought Content: Thought content normal          Judgment: Judgment normal              I personally reviewed labs and imaging reports for today        Last 24 Hours Medication List:   Current Facility-Administered Medications   Medication Dose Route Frequency Provider Last Rate    acetaminophen  650 mg Oral Q6H PRN Juan Daniel Climes, PA-C      amLODIPine  10 mg Oral Daily Stephanie Alcazar, PA-C      docusate sodium  100 mg Oral Daily Juan Daniel Climes, PA-C      doxazosin  1 mg Oral HS DEEPTHI Jones      doxycycline hyclate  100 mg Oral Q12H [facility] Juan Daniel Climes, PA-C      DULoxetine  60 mg Oral Daily Juan Daniel Climes, PA-C      gabapentin  100 mg Oral Daily Juan Daniel Climes, PA-C      gabapentin  300 mg Oral HS Juan Daniel Climes, PA-C      heparin (porcine)  3-30 Units/kg/hr (Order-Specific) Intravenous Titrated Juan Daniel Climes, PA-C 8 Units/kg/hr (05/31/22 0705)    heparin (porcine)  4,000 Units Intravenous Q1H PRN Mak Farmer, DO      heparin (porcine)  8,000 Units Intravenous Q1H PRN Mak Farmer, DO      hydrALAZINE  10 mg Intravenous Q6H PRN Sriram Coronado DO      lidocaine  1 patch Topical Daily PRN Annette Flores PA-C      lidocaine  2 patch Topical Daily Mak Tostacey, DO      magnesium oxide  400 mg Oral BID Lazarus Eth, DO      magnesium sulfate  4 g Intravenous Once DEEPTHI Graves      methocarbamol  750 mg Oral Q6H PRN Bell Henry PA-C      metoprolol succinate  100 mg Oral Daily Belltera Henry PA-C      ondansetron  4 mg Intravenous Q4H PRN Annette Flores PA-C      pantoprazole  40 mg Oral BID YOBANI Lou PA-C      pravastatin  40 mg Oral Daily With FABIOLA Nesbitt      senna  1 tablet Oral HS PRN Bell Henry PA-C      sevelamer  800 mg Oral TID With Meals Kevon Khan MD      tamsulosin  0 4 mg Oral Daily With Dinner Bell Henry PA-C      warfarin  2 5 mg Oral Once (warfarin) Lazarus Eth, DO        await pathology reports   plan for tentative discharge tomorrow    Today, Patient Was Seen By: Lazarus Eth, DO    ** Please Note: Dictation voice to text software may have been used in the creation of this document   **

## 2022-05-31 NOTE — ASSESSMENT & PLAN NOTE
· Diagnosed during prior admission  · Postinfectious suspected  · PermCath placed 5/23-no urgent need for dialysis present-likely will remove this prior to discharge but await final determination by Nephrology team  · Renal biopsy performed 5/26

## 2022-05-31 NOTE — PROGRESS NOTES
Progress Note - Nephrology   Keysha Cox 71 y o  male MRN: [id number]  Unit/Bed#: -01 Encounter: [id number]    ASSESSMENT and PLAN:  Acute kidney injury (POA):  Etiology: Suspect component of ATN as well as post infectious GN  Assessment/PLAN  · Baseline creatinine previously 0 6-0 9  · Admission creatinine 5 04  · Current creatinine 4 61 remains stable  · Status post renal biopsy on 05/26/2022-results pending  · Renal ultrasound was negative for hydronephrosis  · Serology goal workup was negative  · Status post PermCath placement 5/23/2022-has not needed dialysis-site intact; likely will need to be discontinued prior to discharge if dialysis has not been needed  · Clinically, patient is not uremic and there is no acute indication for renal replacement therapy   · Avoid nephrotoxins, adjust meds to appropriate GFR  · Optimize hemodynamic status to avoid delay in renal recovery  · Await renal biopsy results     Nephrotic range proteinuria  Etiology:  Etiology unclear however possible FSGS versus membranous  Assessment and Plan:  · Most recent UPCR 12 4 g  · Status post renal biopsy 05/26/2022  · Anti HUGH 2R from 5/22/2022--pending     Blood pressure/Hypertension:  Assessment and Plan:  · Current blood pressure:  150s to 160s  · Current medications include:  Amlodipine 10 mg daily, doxazosin 1 mg q h s , Toprol  mg daily  · Maximize hemodynamics to maintain MAP >65  · Avoid hypotension or fluctuations in blood pressure  · Will continue to trend     Acute on chronic anemia  Assessment and Plan:  · Current hemoglobin 7 5  · Status post transfusions  · Has adequate iron stores  · Avoid EARLE therapy given contraindication secondary to factor five Leiden deficiency  · Per primary team  · Transfuse if hemoglobin less than 7 0     Hyperphosphatemia  Assessment and Plan:  · Remains on phosphorus binders with Renagel one tablet t i d   With meals (recently calcium containing binders were discontinued in the setting of hypercalcemia)  · Most recent phosphorus 6 0 on 05/24/2022  · Will repeat BMP in a m  Along with phosphorus     Hypercalcemia:  Improved  · Avoid calcium containing products  · Current corrected calcium is 8 2  · Continue to monitor     Hypokalemia:  Assessment and plan:  · Current potassium is 3 7  · S/P K-Dur 40 mEq b i d  5/30/2022  · Repeat BMP in a m  Hypo magnesium:  Assessment plan:  · Will treat with IV magnesium over 6 hours  · Repeat magnesium in a m      Chronic diastolic heart failure:  Assessment and plan:  · Examining euvolemic on exam  · Most recent echo revealed EF of 55% with grade to pseudo normal relaxation  · Will continue monitor I/O, lab values volume status     Surgical site infection/MRSA:  Assessment plan:  · Previous cervical fusion on 50/73/8175 complicated by MRSA infection  · Currently on doxycycline  · Patient remains in cervical collar for an additional two weeks per Neurosurgery note     Factor five Leiden mutation:  With history of left lower extremity DVT  · Remains on heparin drip bridge to Coumadin  · Per primary team  · Avoid EARLE agents this is contraindicated        Review of systems  Patient seen and examined at bedside: feels well over all  Review of Systems   Constitutional: Negative  HENT: Negative  Eyes: Negative  Respiratory: Negative  Cardiovascular: Negative  Gastrointestinal: Negative  Endocrine: Negative  Musculoskeletal: Negative  Skin: Negative             Physical exam:  Current Weight: Weight - Scale: 102 kg (224 lb 1 6 oz)  Vitals:    05/30/22 2138 05/31/22 0410 05/31/22 0600 05/31/22 0752   BP: 145/75   147/75   BP Location:       Pulse: 58   61   Resp: 19   16   Temp: 97 9 °F (36 6 °C)   (!) 97 4 °F (36 3 °C)   TempSrc:       SpO2: 96%   98%   Weight:  102 kg (224 lb 1 6 oz) 102 kg (224 lb 1 6 oz)    Height:           Intake/Output Summary (Last 24 hours) at 5/31/2022 1054  Last data filed at 5/31/2022 0834  Gross per 24 hour Intake 1334 ml   Output 1525 ml   Net -191 ml       Physical Exam  Vitals and nursing note reviewed  Constitutional:       Appearance: Normal appearance  HENT:      Head: Normocephalic and atraumatic  Nose: Nose normal       Mouth/Throat:      Mouth: Mucous membranes are moist       Pharynx: Oropharynx is clear  Eyes:      Extraocular Movements: Extraocular movements intact  Conjunctiva/sclera: Conjunctivae normal    Neck:      Comments: By aging [facility] site intact  Cardiovascular:      Rate and Rhythm: Normal rate and regular rhythm  Pulses: Normal pulses  Heart sounds: Normal heart sounds  Pulmonary:      Effort: Pulmonary effort is normal       Breath sounds: Normal breath sounds  Abdominal:      General: Bowel sounds are normal       Palpations: Abdomen is soft  Musculoskeletal:         General: Normal range of motion  Cervical back: Normal range of motion and neck supple  Skin:     General: Skin is warm and dry  Neurological:      General: No focal deficit present  Mental Status: He is alert and oriented to person, place, and time  Psychiatric:         Mood and Affect: Mood normal          Behavior: Behavior normal          Thought Content:  Thought content normal          Judgment: Judgment normal             Medications:    Current Facility-Administered Medications:     acetaminophen (TYLENOL) tablet 650 mg, 650 mg, Oral, Q6H PRN, Kiki Larios PA-C, 650 mg at 05/28/22 2133    amLODIPine (NORVASC) tablet 10 mg, 10 mg, Oral, Daily, Marga Awad PA-C, 10 mg at 05/31/22 0868    docusate sodium (COLACE) capsule 100 mg, 100 mg, Oral, Daily, Jonelle Lou PA-C, 100 mg at 05/30/22 9649    doxazosin (CARDURA) tablet 1 mg, 1 mg, Oral, , [facility], CRNP, 1 mg at 05/30/22 2138    doxycycline hyclate (VIBRAMYCIN) capsule 100 mg, 100 mg, Oral, Q12H [facility], Jonelle Lou PA-C, 100 mg at 05/31/22 0821    DULoxetine (CYMBALTA) delayed release capsule 60 mg, 60 mg, Oral, Daily, Katie Gonzalez PA-C, 60 mg at 05/31/22 7679    gabapentin (NEURONTIN) capsule 100 mg, 100 mg, Oral, Daily, Jonelle Lou PA-C, 100 mg at 05/31/22 4525    gabapentin (NEURONTIN) capsule 300 mg, 300 mg, Oral, HS, Jonelle Lou PA-C, 300 mg at 05/30/22 2134    heparin (porcine) 25,000 units in 0 45% NaCl 250 mL infusion (premix), 3-30 Units/kg/hr (Order-Specific), Intravenous, Titrated, Jonelle Lou PA-C, Last Rate: 8 mL/hr at 05/31/22 0705, 8 Units/kg/hr at 05/31/22 0705    heparin (porcine) injection 4,000 Units, 4,000 Units, Intravenous, Q1H PRN, Mak Farmer DO, 4,000 Units at 05/30/22 2339    heparin (porcine) injection 8,000 Units, 8,000 Units, Intravenous, Q1H PRN, Mak Farmer DO    hydrALAZINE (APRESOLINE) injection 10 mg, 10 mg, Intravenous, Q6H PRN, Ruben Palmer DO    lidocaine (LIDODERM) 5 % patch 1 patch, 1 patch, Topical, Daily PRN, Annette Flores PA-C, 1 patch at 05/17/22 0032    lidocaine (LIDODERM) 5 % patch 2 patch, 2 patch, Topical, Daily, Mak Farmer DO, 2 patch at 05/26/22 0947    magnesium oxide (MAG-OX) tablet 400 mg, 400 mg, Oral, BID, Aneta Santillan DO, 400 mg at 05/31/22 5306    methocarbamol (ROBAXIN) tablet 750 mg, 750 mg, Oral, Q6H PRN, Katie Gonzalez PA-C, 750 mg at 05/28/22 2133    metoprolol succinate (TOPROL-XL) 24 hr tablet 100 mg, 100 mg, Oral, Daily, Jonelle Lou PA-C, 100 mg at 05/31/22 0821    ondansetron (ZOFRAN) injection 4 mg, 4 mg, Intravenous, Q4H PRN, Annette Flores PA-C, 4 mg at 05/27/22 1045    pantoprazole (PROTONIX) EC tablet 40 mg, 40 mg, Oral, BID AC, Jonelle Lou PA-C, 40 mg at 05/31/22 0610    pravastatin (PRAVACHOL) tablet 40 mg, 40 mg, Oral, Daily With Dinner, Katie Gonzalez PA-C, 40 mg at 05/30/22 1724    senna (SENOKOT) tablet 8 6 mg, 1 tablet, Oral, HS PRN, Katie Gonzalez PA-C, 8 6 mg at 05/25/22 0915    sevelamer (RENAGEL) tablet 800 mg, 800 mg, Oral, TID With Meals, Thomas Don MD, 800 mg at 05/31/22 0743    tamsulosin (FLOMAX) capsule 0 4 mg, 0 4 mg, Oral, Daily With Noris Rodriguez PA-C, 0 4 mg at 05/30/22 1724    Laboratory Results:  Results from last 7 days   Lab Units 05/31/22  0438 05/30/22  0513 05/29/22  0536 05/28/22  1817 05/28/22 0313 05/27/22  1938 05/27/22  1015 05/27/22  0219 05/27/22  0049 05/26/22  0350 05/25/22  0455   WBC Thousand/uL  --  5 54 5 28  --  5 33  --   --   --  5 21 5 10 5 04   HEMOGLOBIN g/dL  --  7 5* 8 1* 8 2* 7 1* 7 9* 7 7* 6 5* 6 4* 7 1* 7 5*   HEMATOCRIT %  --  23 2* 25 5* 25 4* 23 0* 24 5* 24 2* 19 4* 20 2* 21 9* 23 8*   PLATELETS Thousands/uL  --  151 134*  --  131*  --   --   --  136* 161 165   SODIUM mmol/L 136 135* 136  --  135*  --   --   --  136 136 138   POTASSIUM mmol/L 3 7 3 3* 3 3*  --  3 3*  --   --   --  3 6 3 5 3 6   CHLORIDE mmol/L 103 102 102  --  101  --   --   --  103 102 103   CO2 mmol/L 23 24 26  --  22  --   --   --  26 25 26   BUN mg/dL 52* 57* 54*  --  58*  --   --   --  60* 60* 54*   CREATININE mg/dL 4 61* 4 78* 4 65*  --  4 72*  --   --   --  5 02* 5 60* 5 44*   CALCIUM mg/dL 8 2* 8 2* 8 2*  --  8 4  --   --   --  8 4 8 2* 8 5   MAGNESIUM mg/dL 1 3*  --   --   --   --   --   --   --   --   --   --    PHOSPHORUS mg/dL 4 5*  --   --   --   --   --   --   --   --   --   --

## 2022-05-31 NOTE — PLAN OF CARE
Problem: PAIN - ADULT  Goal: Verbalizes/displays adequate comfort level or baseline comfort level  Description: Interventions:  - Encourage patient to monitor pain and request assistance  - Assess pain using appropriate pain scale  - Administer analgesics based on type and severity of pain and evaluate response  - Implement non-pharmacological measures as appropriate and evaluate response  - Consider cultural and social influences on pain and pain management  - Notify physician/advanced practitioner if interventions unsuccessful or patient reports new pain  Outcome: Progressing     Problem: INFECTION - ADULT  Goal: Absence or prevention of progression during hospitalization  Description: INTERVENTIONS:  - Assess and monitor for signs and symptoms of infection  - Monitor lab/diagnostic results  - Monitor all insertion sites, i e  indwelling lines, tubes, and drains  - Monitor endotracheal if appropriate and nasal secretions for changes in amount and color  - [place] appropriate cooling/warming therapies per order  - Administer medications as ordered  - Instruct and encourage patient and family to use good hand hygiene technique  - Identify and instruct in appropriate isolation precautions for identified infection/condition  Outcome: Progressing     Problem: DISCHARGE PLANNING  Goal: Discharge to home or other facility with appropriate resources  Description: INTERVENTIONS:  - Identify barriers to discharge w/patient and caregiver  - Arrange for needed discharge resources and transportation as appropriate  - Identify discharge learning needs (meds, wound care, etc )  - Arrange for interpretive services to assist at discharge as needed  - Refer to Case Management Department for coordinating discharge planning if the patient needs post-hospital services based on physician/advanced practitioner order or complex needs related to functional status, cognitive ability, or social support system  Outcome: Progressing Problem: Knowledge Deficit  Goal: Patient/family/caregiver demonstrates understanding of disease process, treatment plan, medications, and discharge instructions  Description: Complete learning assessment and assess knowledge base  Interventions:  - Provide teaching at level of understanding  - Provide teaching via preferred learning methods  Outcome: Progressing     Problem: Prexisting or High Potential for Compromised Skin Integrity  Goal: Skin integrity is maintained or improved  Description: INTERVENTIONS:  - Identify patients at risk for skin breakdown  - Assess and monitor skin integrity  - Assess and monitor nutrition and hydration status  - Monitor labs   - Assess for incontinence   - Turn and reposition patient  - Assist with mobility/ambulation  - Relieve pressure over bony prominences  - Avoid friction and shearing  - Provide appropriate hygiene as needed including keeping skin clean and dry  - Evaluate need for skin moisturizer/barrier cream  - Collaborate with interdisciplinary team   - Patient/family teaching  - Consider wound care consult   Outcome: Progressing     Problem: Nutrition/Hydration-ADULT  Goal: Nutrient/Hydration intake appropriate for improving, restoring or maintaining nutritional needs  Description: Monitor and assess patient's nutrition/hydration status for malnutrition  Collaborate with interdisciplinary team and initiate plan and interventions as ordered  Monitor patient's weight and dietary intake as ordered or per policy  Utilize nutrition screening tool and intervene as necessary  Determine patient's food preferences and provide high-protein, high-caloric foods as appropriate       INTERVENTIONS:  - Monitor oral intake, urinary output, labs, and treatment plans  - Assess nutrition and hydration status and recommend course of action  - Evaluate amount of meals eaten  - Assist patient with eating if necessary   - Allow adequate time for meals  - Recommend/ encourage appropriate diets, oral nutritional supplements, and vitamin/mineral supplements  - Order, calculate, and assess calorie counts as needed  - Recommend, monitor, and adjust tube feedings and TPN/PPN based on assessed needs  - Assess need for intravenous fluids  - Provide specific nutrition/hydration education as appropriate  - Include patient/family/caregiver in decisions related to nutrition  Outcome: Progressing     Problem: METABOLIC, FLUID AND ELECTROLYTES - ADULT  Goal: Electrolytes maintained within normal limits  Description: INTERVENTIONS:  - Monitor labs and assess patient for signs and symptoms of electrolyte imbalances  - Administer electrolyte replacement as ordered  - Monitor response to electrolyte replacements, including repeat lab results as appropriate  - Instruct patient on fluid and nutrition as appropriate  Outcome: Progressing  Goal: Fluid balance maintained  Description: INTERVENTIONS:  - Monitor labs   - Monitor I/O and WT  - Instruct patient on fluid and nutrition as appropriate  - Assess for signs & symptoms of volume excess or deficit  Outcome: Progressing     Problem: SKIN/TISSUE INTEGRITY - ADULT  Goal: Skin Integrity remains intact(Skin Breakdown Prevention)  Description: Assess:  -Perform Matt assessment every shift  -Clean and moisturize skin every day  -Inspect skin when repositioning, toileting, and assisting with ADLS  -Assess under medical devices such as bracelets every shift  -Assess extremities for adequate circulation and sensation     Bed Management:  -Have minimal linens on bed & keep smooth, unwrinkled  -Change linens as needed when moist or perspiring  -Avoid sitting or lying in one position for more than 2 hours while in bed  -Keep HOB at 30 degrees     Toileting:  -Offer bedside commode  -Assess for incontinence every 2 hours  -Use incontinent care products after each incontinent episode such as barrier cream    Activity:  -Mobilize patient 3 times a day  -Encourage activity and walks on unit  -Encourage or provide ROM exercises   -Turn and reposition patient every 2 Hours  -Use appropriate equipment to lift or move patient in bed  -Instruct/ Assist with weight shifting every 3 hours when out of bed in chair  -Consider limitation of chair time 3 hour intervals    Skin Care:  -Avoid use of baby powder, tape, friction and shearing, hot water or constrictive clothing  -Relieve pressure over bony prominences using allevyn  -Do not massage red bony areas    Next Steps:  -Teach patient strategies to minimize risks such as shifting weight   -Consider consults to  interdisciplinary teams such as wound care  Outcome: Progressing  Goal: Incision(s), wounds(s) or drain site(s) healing without S/S of infection  Description: INTERVENTIONS  - Assess and document dressing, incision, wound bed, drain sites and surrounding tissue  - Provide patient and family education  - Perform skin care/dressing changes every day  Outcome: Progressing     Problem: HEMATOLOGIC - ADULT  Goal: Maintains hematologic stability  Description: INTERVENTIONS  - Assess for signs and symptoms of bleeding or hemorrhage  - Monitor labs  - Administer supportive blood products/factors as ordered and appropriate  Outcome: Progressing     Problem: MUSCULOSKELETAL - ADULT  Goal: Maintain or return mobility to safest level of function  Description: INTERVENTIONS:  - Assess patient's ability to carry out ADLs; assess patient's baseline for ADL function and identify physical deficits which impact ability to perform ADLs (bathing, care of mouth/teeth, toileting, grooming, dressing, etc )  - Assess/evaluate cause of self-care deficits   - Assess range of motion  - Assess patient's mobility  - Assess patient's need for assistive devices and provide as appropriate  - Encourage maximum independence but intervene and supervise when necessary  - Involve family in performance of ADLs  - Assess for home care needs following discharge   - Consider OT consult to assist with ADL evaluation and planning for discharge  - Provide patient education as appropriate  Outcome: Progressing     Problem: MOBILITY - ADULT  Goal: Maintain or return to baseline ADL function  Description: INTERVENTIONS:  -  Assess patient's ability to carry out ADLs; assess patient's baseline for ADL function and identify physical deficits which impact ability to perform ADLs (bathing, care of mouth/teeth, toileting, grooming, dressing, etc )  - Assess/evaluate cause of self-care deficits   - Assess range of motion  - Assess patient's mobility; develop plan if impaired  - Assess patient's need for assistive devices and provide as appropriate  - Encourage maximum independence but intervene and supervise when necessary  - Involve family in performance of ADLs  - Assess for home care needs following discharge   - Consider OT consult to assist with ADL evaluation and planning for discharge  - Provide patient education as appropriate  Outcome: Progressing  Goal: Maintains/Returns to pre admission functional level  Description: INTERVENTIONS:  - Perform BMAT or MOVE assessment daily    - Set and communicate daily mobility goal to care team and patient/family/caregiver  - Collaborate with rehabilitation services on mobility goals if consulted  - Perform Range of Motion 5 times a day  - Reposition patient every 3 hours    - Dangle patient 3 times a day  - Stand patient 3 times a day  - Ambulate patient 3 times a day  - Out of bed to chair 3 times a day   - Out of bed for meals 3 times a day  - Out of bed for toileting  - Record patient progress and toleration of activity level   Outcome: Progressing     Problem: SAFETY ADULT  Goal: Maintain or return to baseline ADL function  Description: INTERVENTIONS:  -  Assess patient's ability to carry out ADLs; assess patient's baseline for ADL function and identify physical deficits which impact ability to perform ADLs (bathing, care of mouth/teeth, toileting, grooming, dressing, etc )  - Assess/evaluate cause of self-care deficits   - Assess range of motion  - Assess patient's mobility; develop plan if impaired  - Assess patient's need for assistive devices and provide as appropriate  - Encourage maximum independence but intervene and supervise when necessary  - Involve family in performance of ADLs  - Assess for home care needs following discharge   - Consider OT consult to assist with ADL evaluation and planning for discharge  - Provide patient education as appropriate  Outcome: Progressing  Goal: Maintains/Returns to pre admission functional level  Description: INTERVENTIONS:  - Perform BMAT or MOVE assessment daily    - Set and communicate daily mobility goal to care team and patient/family/caregiver  - Collaborate with rehabilitation services on mobility goals if consulted  - Perform Range of Motion 5 times a day  - Reposition patient every 3 hours    - Dangle patient 3 times a day  - Stand patient 3 times a day  - Ambulate patient 3 times a day  - Out of bed to chair 3 times a day   - Out of bed for meals 3 times a day  - Out of bed for toileting  - Record patient progress and toleration of activity level   Outcome: Progressing  Goal: Patient will remain free of falls  Description: INTERVENTIONS:  - Educate patient/family on patient safety including physical limitations  - Instruct patient to call for assistance with activity   - Consult OT/PT to assist with strengthening/mobility   - Keep Call bell within reach  - Keep bed low and locked with side rails adjusted as appropriate  - Keep care items and personal belongings within reach  - Initiate and maintain comfort rounds  - Make Fall Risk Sign visible to staff  - Offer Toileting every 2 Hours, in advance of need  - Initiate/Maintain bed alarm  - Obtain necessary fall risk management equipment: non slip socks  - Apply yellow socks and bracelet for high fall risk patients  - Consider moving patient to room near nurses station  Outcome: Progressing

## 2022-05-31 NOTE — TELEPHONE ENCOUNTER
Appointment canceled for Tawana Cox ([id number])  Visit Type: NEW PATIENT PG  Date        Time      Length    Provider                  Department  5/31/2022    2:45 PM  15 mins  DEEPTHI Aponte     [facility] [place]     Reason for Cancellation: Hospitalized     CURRENT INPT, please r/s upon discharge

## 2022-05-31 NOTE — CASE MANAGEMENT
Case Management Discharge Planning Note    Patient name Delmi Romero  Location /-52 MRN [id number]  : 1950 Date 2022       Current Admission Date: 5/10/2022  Current Admission Diagnosis:JANEL (acute kidney injury) [facility])   Patient Active Problem List    Diagnosis Date Noted    Hypomagnesemia 2022    Chronic diastolic (congestive) heart failure ([facility] [address] ) 05/10/2022    Hypokalemia 05/10/2022    Microscopic hematuria 2022    Urinary frequency 2022    Glomerulonephritis     Other proteinuria     Ambulatory dysfunction 2022    JANEL (acute kidney injury) ([facility] [address] ) 2022    Great toe pain, right 04/10/2022    Surgical site infection 2022    Status post cervical spinal fusion 2022    Anemia 2022    Head pain cephalgia 2022    Hyponatremia 2022    Muscle spasm 2022    Goals of care, counseling/discussion 2022    Sinus bradycardia 03/10/2022    Cervical myelopathy (HCC) 2022    Pes anserinus bursitis of right knee 2021    IFG (impaired fasting glucose) 2021    History of DVT of lower extremity 2021    Mixed hyperlipidemia 10/27/2020    Paroxysmal A-fib ([facility] [address] ) 2020    Lower extremity edema 2020    Primary osteoarthritis of left knee 2020    Primary osteoarthritis of right knee 2020    Depression, major, single episode, moderate ([facility] [address] ) 10/30/2017    Insomnia 03/10/2017    Lumbar herniated disc 03/10/2017    Erectile dysfunction 2016    Status post catheter ablation of atrial flutter 2015    Essential hypertension 2015    WILL (obstructive sleep apnea) 2015    Factor V Leiden mutation ([facility] [address] ) 2014      LOS (days): 21  Geometric Mean LOS (GMLOS) (days): 3 10  Days to GMLOS:-17 7     OBJECTIVE:  Risk of Unplanned Readmission Score: 49 33      Current admission status: Inpatient   Preferred Pharmacy:   [address], [place] - [address] [facility] [facility] [address]  [address]  [address] [address]  Phone: [phone] Fax: [phone]    [facility]/pharmacy #[id number] Jonas [place] - [facility]  [address] [place] [address]  Phone: [phone] Fax: [phone]    Primary Care Provider: David Ortiz MD    Primary Insurance: [facility] REP  Secondary Insurance:     DISCHARGE DETAILS:     IMM Given (Date):: 05/31/22 (copy provided to patient and media)  IMM Given to[de-identified] Patient

## 2022-06-01 ENCOUNTER — PATIENT OUTREACH (OUTPATIENT)
Dept: FAMILY MEDICINE CLINIC | Facility: CLINIC | Age: 72
End: 2022-06-01

## 2022-06-01 ENCOUNTER — APPOINTMENT (INPATIENT)
Dept: RADIOLOGY | Facility: HOSPITAL | Age: 72
DRG: 674 | End: 2022-06-01
Payer: COMMERCIAL

## 2022-06-01 LAB
ANION GAP SERPL CALCULATED.3IONS-SCNC: 9 MMOL/L (ref 4–13)
ATRIAL RATE: 74 BPM
BASOPHILS # BLD AUTO: 0.04 THOUSANDS/ΜL (ref 0–0.1)
BASOPHILS NFR BLD AUTO: 1 % (ref 0–1)
BUN SERPL-MCNC: 53 MG/DL (ref 5–25)
CALCIUM SERPL-MCNC: 8.5 MG/DL (ref 8.3–10.1)
CHLORIDE SERPL-SCNC: 102 MMOL/L (ref 100–108)
CO2 SERPL-SCNC: 24 MMOL/L (ref 21–32)
CREAT SERPL-MCNC: 4.69 MG/DL (ref 0.6–1.3)
EOSINOPHIL # BLD AUTO: 0.13 THOUSAND/ΜL (ref 0–0.61)
EOSINOPHIL NFR BLD AUTO: 2 % (ref 0–6)
ERYTHROCYTE [DISTWIDTH] IN BLOOD BY AUTOMATED COUNT: 14 % (ref 11.6–15.1)
GFR SERPL CREATININE-BSD FRML MDRD: 11 ML/MIN/1.73SQ M
GLUCOSE SERPL-MCNC: 89 MG/DL (ref 65–140)
HCT VFR BLD AUTO: 28.5 % (ref 36.5–49.3)
HGB BLD-MCNC: 8.9 G/DL (ref 12–17)
IMM GRANULOCYTES # BLD AUTO: 0.01 THOUSAND/UL (ref 0–0.2)
IMM GRANULOCYTES NFR BLD AUTO: 0 % (ref 0–2)
INR PPP: 2.48 (ref 0.84–1.19)
LYMPHOCYTES # BLD AUTO: 1.16 THOUSANDS/ΜL (ref 0.6–4.47)
LYMPHOCYTES NFR BLD AUTO: 19 % (ref 14–44)
MAGNESIUM SERPL-MCNC: 2.2 MG/DL (ref 1.6–2.6)
MCH RBC QN AUTO: 28.2 PG (ref 26.8–34.3)
MCHC RBC AUTO-ENTMCNC: 31.2 G/DL (ref 31.4–37.4)
MCV RBC AUTO: 90 FL (ref 82–98)
MONOCYTES # BLD AUTO: 0.55 THOUSAND/ΜL (ref 0.17–1.22)
MONOCYTES NFR BLD AUTO: 9 % (ref 4–12)
NEUTROPHILS # BLD AUTO: 4.14 THOUSANDS/ΜL (ref 1.85–7.62)
NEUTS SEG NFR BLD AUTO: 69 % (ref 43–75)
NRBC BLD AUTO-RTO: 0 /100 WBCS
P AXIS: 113 DEGREES
PLATELET # BLD AUTO: 194 THOUSANDS/UL (ref 149–390)
PMV BLD AUTO: 10.9 FL (ref 8.9–12.7)
POTASSIUM SERPL-SCNC: 3.9 MMOL/L (ref 3.5–5.3)
PR INTERVAL: 212 MS
PROTHROMBIN TIME: 26.2 SECONDS (ref 11.6–14.5)
QRS AXIS: 127 DEGREES
QRSD INTERVAL: 188 MS
QT INTERVAL: 434 MS
QTC INTERVAL: 481 MS
RBC # BLD AUTO: 3.16 MILLION/UL (ref 3.88–5.62)
SODIUM SERPL-SCNC: 135 MMOL/L (ref 136–145)
T WAVE AXIS: 199 DEGREES
VENTRICULAR RATE: 74 BPM
WBC # BLD AUTO: 6.03 THOUSAND/UL (ref 4.31–10.16)

## 2022-06-01 PROCEDURE — 83735 ASSAY OF MAGNESIUM: CPT | Performed by: INTERNAL MEDICINE

## 2022-06-01 PROCEDURE — 85025 COMPLETE CBC W/AUTO DIFF WBC: CPT | Performed by: INTERNAL MEDICINE

## 2022-06-01 PROCEDURE — 71101 X-RAY EXAM UNILAT RIBS/CHEST: CPT

## 2022-06-01 PROCEDURE — 85610 PROTHROMBIN TIME: CPT | Performed by: INTERNAL MEDICINE

## 2022-06-01 PROCEDURE — 93010 ELECTROCARDIOGRAM REPORT: CPT | Performed by: INTERNAL MEDICINE

## 2022-06-01 PROCEDURE — 99232 SBSQ HOSP IP/OBS MODERATE 35: CPT | Performed by: INTERNAL MEDICINE

## 2022-06-01 PROCEDURE — 80048 BASIC METABOLIC PNL TOTAL CA: CPT | Performed by: INTERNAL MEDICINE

## 2022-06-01 RX ORDER — WARFARIN SODIUM 2.5 MG/1
2.5 TABLET ORAL
Status: COMPLETED | OUTPATIENT
Start: 2022-06-01 | End: 2022-06-01

## 2022-06-01 RX ADMIN — AMLODIPINE BESYLATE 10 MG: 5 TABLET ORAL at 08:26

## 2022-06-01 RX ADMIN — DOXYCYCLINE 100 MG: 100 CAPSULE ORAL at 08:26

## 2022-06-01 RX ADMIN — PANTOPRAZOLE SODIUM 40 MG: 40 TABLET, DELAYED RELEASE ORAL at 05:49

## 2022-06-01 RX ADMIN — DOXAZOSIN 1 MG: 1 TABLET ORAL at 21:46

## 2022-06-01 RX ADMIN — PANTOPRAZOLE SODIUM 40 MG: 40 TABLET, DELAYED RELEASE ORAL at 17:28

## 2022-06-01 RX ADMIN — TAMSULOSIN HYDROCHLORIDE 0.4 MG: 0.4 CAPSULE ORAL at 17:28

## 2022-06-01 RX ADMIN — DULOXETINE 60 MG: 30 CAPSULE, DELAYED RELEASE ORAL at 08:26

## 2022-06-01 RX ADMIN — MAGNESIUM OXIDE TAB 400 MG (241.3 MG ELEMENTAL MG) 400 MG: 400 (241.3 MG) TAB at 05:49

## 2022-06-01 RX ADMIN — PRAVASTATIN SODIUM 40 MG: 40 TABLET ORAL at 17:28

## 2022-06-01 RX ADMIN — GABAPENTIN 300 MG: 300 CAPSULE ORAL at 21:46

## 2022-06-01 RX ADMIN — WARFARIN SODIUM 2.5 MG: 2.5 TABLET ORAL at 17:28

## 2022-06-01 RX ADMIN — DOCUSATE SODIUM 100 MG: 100 CAPSULE, LIQUID FILLED ORAL at 08:26

## 2022-06-01 RX ADMIN — SEVELAMER HYDROCHLORIDE 800 MG: 800 TABLET, FILM COATED PARENTERAL at 17:28

## 2022-06-01 RX ADMIN — METOPROLOL SUCCINATE 100 MG: 50 TABLET, EXTENDED RELEASE ORAL at 08:26

## 2022-06-01 RX ADMIN — SEVELAMER HYDROCHLORIDE 800 MG: 800 TABLET, FILM COATED PARENTERAL at 12:04

## 2022-06-01 RX ADMIN — SEVELAMER HYDROCHLORIDE 800 MG: 800 TABLET, FILM COATED PARENTERAL at 08:26

## 2022-06-01 RX ADMIN — GABAPENTIN 100 MG: 100 CAPSULE ORAL at 08:26

## 2022-06-01 NOTE — ASSESSMENT & PLAN NOTE
· Home regimen:  Amlodipine 5 mg daily and metoprolol succinate 100 mg daily  · Amlodipine had been increased to 10 mg daily during this admission  ·  Hydralazine discontinued per Nephrology given low blood pressures  · Blood pressure stable  130/68

## 2022-06-01 NOTE — PROGRESS NOTES
Outpatient Care Management Note:    Lorena Orozco message received from [facility]: Patient's spouse, Arley Burdick, left a message on the department line  Requesting a call back, she mentioned questions about an application  Patient is still admitted at [address]  CM called Arley Burdick  She wanted to know what she is to do with the [facility]  She will complete the application, attach a copy of Tian's license, and mail it back to the address on the application  I updated Arley Burdick that our community health worker has been out of the office for an extended leave and apologized for any confusion  Arley Burdick states that she is hoping Emmet Cockayne will be discharged tomorrow  He is still urinating with blood and having issues with dropping hemoglobin requiring transfusion  CM informed Arley Burdick that I will outreach her when Emmet Cockayne is discharged  She can call with any questions

## 2022-06-01 NOTE — PLAN OF CARE
Problem: PAIN - ADULT  Goal: Verbalizes/displays adequate comfort level or baseline comfort level  Description: Interventions:  - Encourage patient to monitor pain and request assistance  - Assess pain using appropriate pain scale  - Administer analgesics based on type and severity of pain and evaluate response  - Implement non-pharmacological measures as appropriate and evaluate response  - Consider cultural and social influences on pain and pain management  - Notify physician/advanced practitioner if interventions unsuccessful or patient reports new pain  Outcome: Progressing     Problem: INFECTION - ADULT  Goal: Absence or prevention of progression during hospitalization  Description: INTERVENTIONS:  - Assess and monitor for signs and symptoms of infection  - Monitor lab/diagnostic results  - Monitor all insertion sites, i e  indwelling lines, tubes, and drains  - Monitor endotracheal if appropriate and nasal secretions for changes in amount and color  - [place] appropriate cooling/warming therapies per order  - Administer medications as ordered  - Instruct and encourage patient and family to use good hand hygiene technique  - Identify and instruct in appropriate isolation precautions for identified infection/condition  Outcome: Progressing     Problem: DISCHARGE PLANNING  Goal: Discharge to home or other facility with appropriate resources  Description: INTERVENTIONS:  - Identify barriers to discharge w/patient and caregiver  - Arrange for needed discharge resources and transportation as appropriate  - Identify discharge learning needs (meds, wound care, etc )  - Arrange for interpretive services to assist at discharge as needed  - Refer to Case Management Department for coordinating discharge planning if the patient needs post-hospital services based on physician/advanced practitioner order or complex needs related to functional status, cognitive ability, or social support system  Outcome: Progressing Problem: Knowledge Deficit  Goal: Patient/family/caregiver demonstrates understanding of disease process, treatment plan, medications, and discharge instructions  Description: Complete learning assessment and assess knowledge base  Interventions:  - Provide teaching at level of understanding  - Provide teaching via preferred learning methods  Outcome: Progressing     Problem: Prexisting or High Potential for Compromised Skin Integrity  Goal: Skin integrity is maintained or improved  Description: INTERVENTIONS:  - Identify patients at risk for skin breakdown  - Assess and monitor skin integrity  - Assess and monitor nutrition and hydration status  - Monitor labs   - Assess for incontinence   - Turn and reposition patient  - Assist with mobility/ambulation  - Relieve pressure over bony prominences  - Avoid friction and shearing  - Provide appropriate hygiene as needed including keeping skin clean and dry  - Evaluate need for skin moisturizer/barrier cream  - Collaborate with interdisciplinary team   - Patient/family teaching  - Consider wound care consult   Outcome: Progressing     Problem: Nutrition/Hydration-ADULT  Goal: Nutrient/Hydration intake appropriate for improving, restoring or maintaining nutritional needs  Description: Monitor and assess patient's nutrition/hydration status for malnutrition  Collaborate with interdisciplinary team and initiate plan and interventions as ordered  Monitor patient's weight and dietary intake as ordered or per policy  Utilize nutrition screening tool and intervene as necessary  Determine patient's food preferences and provide high-protein, high-caloric foods as appropriate       INTERVENTIONS:  - Monitor oral intake, urinary output, labs, and treatment plans  - Assess nutrition and hydration status and recommend course of action  - Evaluate amount of meals eaten  - Assist patient with eating if necessary   - Allow adequate time for meals  - Recommend/ encourage appropriate diets, oral nutritional supplements, and vitamin/mineral supplements  - Order, calculate, and assess calorie counts as needed  - Recommend, monitor, and adjust tube feedings and TPN/PPN based on assessed needs  - Assess need for intravenous fluids  - Provide specific nutrition/hydration education as appropriate  - Include patient/family/caregiver in decisions related to nutrition  Outcome: Progressing     Problem: METABOLIC, FLUID AND ELECTROLYTES - ADULT  Goal: Electrolytes maintained within normal limits  Description: INTERVENTIONS:  - Monitor labs and assess patient for signs and symptoms of electrolyte imbalances  - Administer electrolyte replacement as ordered  - Monitor response to electrolyte replacements, including repeat lab results as appropriate  - Instruct patient on fluid and nutrition as appropriate  Outcome: Progressing  Goal: Fluid balance maintained  Description: INTERVENTIONS:  - Monitor labs   - Monitor I/O and WT  - Instruct patient on fluid and nutrition as appropriate  - Assess for signs & symptoms of volume excess or deficit  Outcome: Progressing     Problem: SKIN/TISSUE INTEGRITY - ADULT  Goal: Skin Integrity remains intact(Skin Breakdown Prevention)  Description: Assess:  -Perform Matt assessment every shift  -Clean and moisturize skin every day  -Inspect skin when repositioning, toileting, and assisting with ADLS  -Assess under medical devices such as bracelets every shift  -Assess extremities for adequate circulation and sensation     Bed Management:  -Have minimal linens on bed & keep smooth, unwrinkled  -Change linens as needed when moist or perspiring  -Avoid sitting or lying in one position for more than 2 hours while in bed  -Keep HOB at 30 degrees     Toileting:  -Offer bedside commode  -Assess for incontinence every 2 hours  -Use incontinent care products after each incontinent episode such as barrier cream    Activity:  -Mobilize patient 3 times a day  -Encourage activity and walks on unit  -Encourage or provide ROM exercises   -Turn and reposition patient every 2 Hours  -Use appropriate equipment to lift or move patient in bed  -Instruct/ Assist with weight shifting every 3 hours when out of bed in chair  -Consider limitation of chair time 3 hour intervals    Skin Care:  -Avoid use of baby powder, tape, friction and shearing, hot water or constrictive clothing  -Relieve pressure over bony prominences using allevyn  -Do not massage red bony areas    Next Steps:  -Teach patient strategies to minimize risks such as shifting weight   -Consider consults to  interdisciplinary teams such as wound care  Outcome: Progressing  Goal: Incision(s), wounds(s) or drain site(s) healing without S/S of infection  Description: INTERVENTIONS  - Assess and document dressing, incision, wound bed, drain sites and surrounding tissue  - Provide patient and family education  - Perform skin care/dressing changes every day  Outcome: Progressing     Problem: HEMATOLOGIC - ADULT  Goal: Maintains hematologic stability  Description: INTERVENTIONS  - Assess for signs and symptoms of bleeding or hemorrhage  - Monitor labs  - Administer supportive blood products/factors as ordered and appropriate  Outcome: Progressing     Problem: MUSCULOSKELETAL - ADULT  Goal: Maintain or return mobility to safest level of function  Description: INTERVENTIONS:  - Assess patient's ability to carry out ADLs; assess patient's baseline for ADL function and identify physical deficits which impact ability to perform ADLs (bathing, care of mouth/teeth, toileting, grooming, dressing, etc )  - Assess/evaluate cause of self-care deficits   - Assess range of motion  - Assess patient's mobility  - Assess patient's need for assistive devices and provide as appropriate  - Encourage maximum independence but intervene and supervise when necessary  - Involve family in performance of ADLs  - Assess for home care needs following discharge   - Consider OT consult to assist with ADL evaluation and planning for discharge  - Provide patient education as appropriate  Outcome: Progressing     Problem: MOBILITY - ADULT  Goal: Maintain or return to baseline ADL function  Description: INTERVENTIONS:  -  Assess patient's ability to carry out ADLs; assess patient's baseline for ADL function and identify physical deficits which impact ability to perform ADLs (bathing, care of mouth/teeth, toileting, grooming, dressing, etc )  - Assess/evaluate cause of self-care deficits   - Assess range of motion  - Assess patient's mobility; develop plan if impaired  - Assess patient's need for assistive devices and provide as appropriate  - Encourage maximum independence but intervene and supervise when necessary  - Involve family in performance of ADLs  - Assess for home care needs following discharge   - Consider OT consult to assist with ADL evaluation and planning for discharge  - Provide patient education as appropriate  Outcome: Progressing  Goal: Maintains/Returns to pre admission functional level  Description: INTERVENTIONS:  - Perform BMAT or MOVE assessment daily    - Set and communicate daily mobility goal to care team and patient/family/caregiver  - Collaborate with rehabilitation services on mobility goals if consulted  - Perform Range of Motion 5 times a day  - Reposition patient every 3 hours    - Dangle patient 3 times a day  - Stand patient 3 times a day  - Ambulate patient 3 times a day  - Out of bed to chair 3 times a day   - Out of bed for meals 3 times a day  - Out of bed for toileting  - Record patient progress and toleration of activity level   Outcome: Progressing     Problem: SAFETY ADULT  Goal: Maintain or return to baseline ADL function  Description: INTERVENTIONS:  -  Assess patient's ability to carry out ADLs; assess patient's baseline for ADL function and identify physical deficits which impact ability to perform ADLs (bathing, care of mouth/teeth, toileting, grooming, dressing, etc )  - Assess/evaluate cause of self-care deficits   - Assess range of motion  - Assess patient's mobility; develop plan if impaired  - Assess patient's need for assistive devices and provide as appropriate  - Encourage maximum independence but intervene and supervise when necessary  - Involve family in performance of ADLs  - Assess for home care needs following discharge   - Consider OT consult to assist with ADL evaluation and planning for discharge  - Provide patient education as appropriate  Outcome: Progressing  Goal: Maintains/Returns to pre admission functional level  Description: INTERVENTIONS:  - Perform BMAT or MOVE assessment daily    - Set and communicate daily mobility goal to care team and patient/family/caregiver  - Collaborate with rehabilitation services on mobility goals if consulted  - Perform Range of Motion 5 times a day  - Reposition patient every 3 hours    - Dangle patient 3 times a day  - Stand patient 3 times a day  - Ambulate patient 3 times a day  - Out of bed to chair 3 times a day   - Out of bed for meals 3 times a day  - Out of bed for toileting  - Record patient progress and toleration of activity level   Outcome: Progressing  Goal: Patient will remain free of falls  Description: INTERVENTIONS:  - Educate patient/family on patient safety including physical limitations  - Instruct patient to call for assistance with activity   - Consult OT/PT to assist with strengthening/mobility   - Keep Call bell within reach  - Keep bed low and locked with side rails adjusted as appropriate  - Keep care items and personal belongings within reach  - Initiate and maintain comfort rounds  - Make Fall Risk Sign visible to staff  - Offer Toileting every 2 Hours, in advance of need  - Initiate/Maintain bed alarm  - Obtain necessary fall risk management equipment: non slip socks  - Apply yellow socks and bracelet for high fall risk patients  - Consider moving patient to room near nurses station  Outcome: Progressing

## 2022-06-01 NOTE — PROGRESS NOTES
Progress Note - Nephrology   Thermon Honorio 71 y o  male MRN: [id number]  Unit/Bed#: -01 Encounter: [id number]    ASSESSMENT and PLAN:  Acute kidney injury (POA):  Etiology: Suspect component of ATN as well as post infectious GN  Assessment/PLAN  · Baseline creatinine previously 0 6-0 9  · Admission creatinine 5 04  · Current creatinine 4 69 remains stable-likely new baseline  · Status post renal biopsy on 05/26/2022-results pending  · Sent to [facility]  · Renal ultrasound was negative for hydronephrosis  · Serology goal workup was negative  · Status post PermCath placement 5/23/2022-has not needed dialysis-site intact; likely will need to be discontinued prior to discharge if dialysis has not been needed-will discuss with Dr Roby Carlos to best timeing  · Clinically, patient is not uremic and there is no acute indication for renal replacement therapy   · Avoid nephrotoxins, adjust meds to appropriate GFR  · Optimize hemodynamic status to avoid delay in renal recovery  · Await renal biopsy results     Nephrotic range proteinuria  Etiology:  Etiology unclear however possible FSGS versus membranous  Assessment and Plan:  · Most recent UPCR 12 4 g on 05/05/2022  · Status post renal biopsy 05/26/2022  · Anti HUGH 2R from 5/22/2022--still pending  · Consider repeating U PCR     Blood pressure/Hypertension:  Assessment and Plan:  · Current blood pressure:  Stable 130s to 140s  · Current medications include:  Amlodipine 10 mg daily, doxazosin 1 mg q h s , Toprol  mg daily  · Maximize hemodynamics to maintain MAP >65  · Avoid hypotension or fluctuations in blood pressure  · Will continue to trend     Acute on chronic anemia  Assessment and Plan:  · Current hemoglobin 7 5 on 5/30/2022  · Status post transfusions  · Has adequate iron stores  · Avoid EARLE therapy given contraindication secondary to factor five Leiden deficiency  · Per primary team  · Transfuse if hemoglobin less than 7 0     Hyperphosphatemia  Assessment and Plan:  · Remains on phosphorus binders with Renagel one tablet t i d  With meals (recently calcium containing binders were discontinued in the setting of hypercalcemia)  · Most recent phosphorus 4 5 on 05/31/2022     Hypercalcemia:  Improved  · Avoid calcium containing products  · Current corrected calcium is 10 26  · Continue to monitor     Hypokalemia:  Assessment and plan:  · Current potassium is 3 9  · Status post oral supplements  · Repeat BMP in a m      Hypo magnesium:  Assessment plan:  · Improved to 2 2 status post IV and oral supplements  · Repeat magnesium in a m     Mild hyponatremia:  Assessment and plan:  · Sodium has been fluctuating between 251329 since 05/27/2022  · Patient reported having episode of diarrhea yesterday but maintains oral intake  · Will continue to monitor for now     Chronic diastolic heart failure:  Assessment and plan:  · Examining euvolemic on exam  · Most recent echo revealed EF of 55% with grade to pseudo normal relaxation  · Will continue monitor I/O, lab values volume status     Surgical site infection/MRSA:  Assessment plan:  · Previous cervical fusion on 59/47/9062 complicated by MRSA infection  · Currently on doxycycline  · Patient remains in cervical collar for an additional two weeks per Neurosurgery note     Factor five Leiden mutation:  With history of left lower extremity DVT  · Remains on heparin drip bridge to Coumadin  · Per primary team  · Avoid EARLE agents this is contraindicated     Right flank/rib pain:  With movement  · Reported today by patient  · Discussed with Dr Aline Agarwal to evaluate    Review of systems  Patient seen and examined at bedside:  Feels well overall  Looking forward to go home  Anticipating biopsy results and wondering when PermCath will be removed  Report episode of diarrhea x1 yesterday none today  Review of Systems   Constitutional: Negative  HENT: Negative  Eyes: Negative  Respiratory: Negative  Cardiovascular: Negative  Gastrointestinal: Positive for diarrhea  Genitourinary: Negative  Musculoskeletal:        Complained of right flank/rib pain with movement resolves when resting  Skin: Negative  Neurological: Negative  Hematological: Negative  Psychiatric/Behavioral: Negative  Physical exam:  Current Weight: Weight - Scale: 102 kg (224 lb)  Vitals:    05/31/22 2051 05/31/22 2158 06/01/22 0552 06/01/22 0720   BP:  132/69  130/68   Pulse:  58  59   Resp:    16   Temp:  97 9 °F (36 6 °C)  (!) 97 4 °F (36 3 °C)   TempSrc:       SpO2: 98% 97%  97%   Weight:   102 kg (224 lb)    Height:           Intake/Output Summary (Last 24 hours) at 6/1/2022 1000  Last data filed at 6/1/2022 0759  Gross per 24 hour   Intake 1160 ml   Output 1700 ml   Net -540 ml       Physical Exam  Constitutional:       Appearance: Normal appearance  Comments: NAD, resting in bed   HENT:      Head: Normocephalic and atraumatic  Nose: Nose normal       Mouth/Throat:      Mouth: Mucous membranes are moist       Pharynx: Oropharynx is clear  Eyes:      Extraocular Movements: Extraocular movements intact  Conjunctiva/sclera: Conjunctivae normal    Neck:      Comments: Cervical neck collar in place  Cardiovascular:      Rate and Rhythm: Normal rate and regular rhythm  Pulses: Normal pulses  Heart sounds: Normal heart sounds  Pulmonary:      Effort: Pulmonary effort is normal       Breath sounds: Normal breath sounds  Abdominal:      General: Bowel sounds are normal       Palpations: Abdomen is soft  Musculoskeletal:         General: Normal range of motion  Skin:     General: Skin is warm and dry  Neurological:      General: No focal deficit present  Mental Status: He is alert and oriented to person, place, and time  Psychiatric:         Mood and Affect: Mood normal          Behavior: Behavior normal          Thought Content:  Thought content normal          Judgment: Judgment normal  Medications:    Current Facility-Administered Medications:     acetaminophen (TYLENOL) tablet 650 mg, 650 mg, Oral, Q6H PRN, Corbin Plascencia PA-C, 650 mg at 05/28/22 2133    amLODIPine (NORVASC) tablet 10 mg, 10 mg, Oral, Daily, Sharee Schlatter Gyarmaty, PA-C, 10 mg at 06/01/22 5241    docusate sodium (COLACE) capsule 100 mg, 100 mg, Oral, Daily, Jonelle Lou PA-C, 100 mg at 06/01/22 6401    doxazosin (CARDURA) tablet 1 mg, 1 mg, Oral, HS, Moira Drone, CRNP, 1 mg at 05/31/22 2247    doxycycline hyclate (VIBRAMYCIN) capsule 100 mg, 100 mg, Oral, Q12H [address], Jonelle Lou PA-C, 100 mg at 06/01/22 0826    DULoxetine (CYMBALTA) delayed release capsule 60 mg, 60 mg, Oral, Daily, Jonelle Lou PA-C, 60 mg at 06/01/22 0826    gabapentin (NEURONTIN) capsule 100 mg, 100 mg, Oral, Daily, Jonelle Lou PA-C, 100 mg at 06/01/22 0826    gabapentin (NEURONTIN) capsule 300 mg, 300 mg, Oral, HS, Jonelle Lou PA-C, 300 mg at 05/31/22 2247    hydrALAZINE (APRESOLINE) injection 10 mg, 10 mg, Intravenous, Q6H PRN, Lulu Vivas DO    lidocaine (LIDODERM) 5 % patch 1 patch, 1 patch, Topical, Daily PRN, Annette Flores PA-C, 1 patch at 05/17/22 0032    lidocaine (LIDODERM) 5 % patch 2 patch, 2 patch, Topical, Daily, Mak Farmer DO, 2 patch at 05/26/22 0947    magnesium oxide (MAG-OX) tablet 400 mg, 400 mg, Oral, BID, Aneta Santillan DO, 400 mg at 06/01/22 0549    methocarbamol (ROBAXIN) tablet 750 mg, 750 mg, Oral, Q6H PRN, Corbin Plascencia PA-C, 750 mg at 05/28/22 2133    metoprolol succinate (TOPROL-XL) 24 hr tablet 100 mg, 100 mg, Oral, Daily, Jonelle Lou PA-C, 100 mg at 06/01/22 0826    ondansetron (ZOFRAN) injection 4 mg, 4 mg, Intravenous, Q4H PRN, Annette Flores PA-C, 4 mg at 05/27/22 1045    pantoprazole (PROTONIX) EC tablet 40 mg, 40 mg, Oral, BID AC, Jonelle Lou PA-C, 40 mg at 06/01/22 0549    pravastatin (PRAVACHOL) tablet 40 mg, 40 mg, Oral, Daily With Dinner, Corbin Plascencia PA-C, 40 mg at 05/31/22 1635    senna (SENOKOT) tablet 8 6 mg, 1 tablet, Oral, HS PRN, Virgil Beard PA-C, 8 6 mg at 05/25/22 0915    sevelamer (RENAGEL) tablet 800 mg, 800 mg, Oral, TID With Meals, Rosa Maria Roberts MD, 800 mg at 06/01/22 0826    tamsulosin (FLOMAX) capsule 0 4 mg, 0 4 mg, Oral, Daily With Jenny FABIOLA Thayer, 0 4 mg at 05/31/22 1635    Laboratory Results:  Results from last 7 days   Lab Units 06/01/22  0557 05/31/22  0438 05/30/22  0513 05/29/22  0536 05/28/22  1817 05/28/22  0313 05/27/22  1938 05/27/22  1015 05/27/22  0219 05/27/22  0049 05/26/22  0350   WBC Thousand/uL  --   --  5 54 5 28  --  5 33  --   --   --  5 21 5 10   HEMOGLOBIN g/dL  --   --  7 5* 8 1* 8 2* 7 1* 7 9* 7 7* 6 5* 6 4* 7 1*   HEMATOCRIT %  --   --  23 2* 25 5* 25 4* 23 0* 24 5* 24 2* 19 4* 20 2* 21 9*   PLATELETS Thousands/uL  --   --  151 134*  --  131*  --   --   --  136* 161   SODIUM mmol/L 135* 136 135* 136  --  135*  --   --   --  136 136   POTASSIUM mmol/L 3 9 3 7 3 3* 3 3*  --  3 3*  --   --   --  3 6 3 5   CHLORIDE mmol/L 102 103 102 102  --  101  --   --   --  103 102   CO2 mmol/L 24 23 24 26  --  22  --   --   --  26 25   BUN mg/dL 53* 52* 57* 54*  --  58*  --   --   --  60* 60*   CREATININE mg/dL 4 69* 4 61* 4 78* 4 65*  --  4 72*  --   --   --  5 02* 5 60*   CALCIUM mg/dL 8 5 8 2* 8 2* 8 2*  --  8 4  --   --   --  8 4 8 2*   MAGNESIUM mg/dL 2 2 1 3*  --   --   --   --   --   --   --   --   --    PHOSPHORUS mg/dL  --  4 5*  --   --   --   --   --   --   --   --   --

## 2022-06-01 NOTE — PROGRESS NOTES
[place]  Progress Note - Gilberto Jennings 1950, 71 y o  male MRN: [id number]  Unit/Bed#: -[facility] Encounter: [id number]  Primary Care Provider: Tunde Fay MD   Date and time admitted to hospital: 5/10/2022  6:51 PM    Glomerulonephritis  Assessment & Plan  · Diagnosed during prior admission  · Postinfectious suspected  · PermCath placed 5/23-no urgent need for dialysis present-likely will remove this prior to discharge but await final determination by Nephrology team  · Renal biopsy performed 5/26     Factor V Leiden mutation [facility])  Assessment & Plan  · Patient has history of left lower extremity DVT after which he was diagnosed with factor 5 Leiden mutation  This happened years ago  · Held Coumadin prior to biopsy and was on IV heparin until 6 May 31, 2022  ·  INR 2 48 today-will give 2 5 mg dose today  · Daily INR -      * JANEL (acute kidney injury) ([facility] [address] )  Assessment & Plan  · Patient admitted with acute kidney injury felt to be most likely due to post infectious  glomerulonephritis    ·  Dr Mary Myers recommending cervical collar remain in place for at least additional 2 weeks  · Renal biopsy performed 5/26-results -being reviewed by Nephrology-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 postinfectious-await Dr Sravanthi Fabian opinion  · Peak creatinine of 5 6-now at 4 69 today -this may be his new baseline  ·  Await decision by Nephrology on treatment options    Hypokalemia  Assessment & Plan  · Potassium 2 8 on admission  · Then given 40 mEq p o  potassium and 20 mEq IV potassium -  · Stable today-continue on oral supplement  ·      Chronic diastolic (congestive) heart failure (HCC)  Assessment & Plan  Wt Readings from Last 3 Encounters:   06/01/22 102 kg (224 lb)   05/03/22 114 kg (251 lb)   04/29/22 113 kg (250 lb 1 6 oz)     · Prior echo 04/25/2022:  EF 99%, Diastolic function: grade 2 pseudonormal relaxation  · Monitor I/O and daily weights  · Does not take outpatient diuretics  IV Lasix with minimal results during prior admission  Patient had received albumin to help with anasarca  · Continue on  Fluid restriction, 2 g sodium    Paroxysmal A-fib ([address] )  Assessment & Plan  · Patient has PAF  He denies palpitations  · Patient currently in  sinus rhythm-had prior ablation for a flutter according to old chart  · Continue Toprol  ·  Continue warfarin for anticoagulation    Essential hypertension  Assessment & Plan  · Home regimen:  Amlodipine 5 mg daily and metoprolol succinate 100 mg daily  · Amlodipine had been increased to 10 mg daily during this admission  ·  Hydralazine discontinued per Nephrology given low blood pressures  · Blood pressure stable  130/68    Surgical site infection  Assessment & Plan  · S/p cervical fusion performed by Dr Deo Carlos on , complicated by MRSA infection  · Completed 24 days of IV vancomycin, transitioned to daptomycin on -  Discharged home on doxycycline 100 mg b i d    · Continue doxycycline  · Will need follow up with Neurosurgery as an outpatient-             VTE Prophylaxis: in place    Patient Centered Rounds: I rounded with patient's nurse    Current Length of Stay: 22 day(s)    Current Patient Status: Inpatient    Certification Statement: Pt requires additional inpatient hospital stay due to: see assessment and plan        Subjective:    Patient having loose stools in the past 24-48 hours-we had held his Colace    Had been ambulating in the room yesterday now just back in for to the bathroom but will encourage him to try to ambulate more this afternoon if he is stable will also hold on the magnesium tablets    All other ROS are negative    Objective:     Vitals:   Temp (24hrs), Av 7 °F (36 5 °C), Min:97 4 °F (36 3 °C), Max:97 9 °F (36 6 °C)    Temp:  [97 4 °F (36 3 °C)-97 9 °F (36 6 °C)] 97 4 °F (36 3 °C)  HR:  [57-59] 59  Resp:  [16-18] 16  BP: (130-132)/(68-69) 131/68  SpO2:  [97 %-98 %] 97 %  Body mass index is 31 24 kg/m²  Input and Output Summary (last 24 hours): Intake/Output Summary (Last 24 hours) at 6/1/2022 1442  Last data filed at 6/1/2022 1100  Gross per 24 hour   Intake 1779 ml   Output 1400 ml   Net 379 ml       Physical Exam:     Physical Exam  Vitals and nursing note reviewed  Constitutional:       General: He is not in acute distress  HENT:      Head: Normocephalic  Mouth/Throat:      Pharynx: No posterior oropharyngeal erythema  Eyes:      Extraocular Movements: Extraocular movements intact  Pupils: Pupils are equal, round, and reactive to light  Neck:      Comments: Cervical collar in place  Cardiovascular:      Rate and Rhythm: Normal rate and regular rhythm  Pulmonary:      Breath sounds: No wheezing or rhonchi  Abdominal:      General: There is no distension  Palpations: Abdomen is soft  Comments: Hyperactive bowel sounds   Musculoskeletal:      Right lower leg: No edema  Left lower leg: No edema  Neurological:      General: No focal deficit present  Mental Status: He is alert  I personally reviewed labs and imaging reports for today        Last 24 Hours Medication List:   Current Facility-Administered Medications   Medication Dose Route Frequency Provider Last Rate    acetaminophen  650 mg Oral Q6H PRN Filemon Jennings PA-C      amLODIPine  10 mg Oral Daily Alex Joyce PA-C      docusate sodium  100 mg Oral Daily Filemon Jennings PA-C      doxazosin  1 mg Oral HS Lai Dark, CRNP      doxycycline hyclate  100 mg Oral Q12H [address] Filemon Jennings PA-C      DULoxetine  60 mg Oral Daily Filemon Jennings PA-C      gabapentin  100 mg Oral Daily Filemon Jennings PA-C      gabapentin  300 mg Oral HS Filemon Jennings PA-C      hydrALAZINE  10 mg Intravenous Q6H PRN Linnette Lares, DO      lidocaine  1 patch Topical Daily PRN Annette Flores PA-C      lidocaine  2 patch Topical Daily Mak Farmer, DO      magnesium oxide  400 mg Oral BID Cinthia Fast, DO      methocarbamol 750 mg Oral Q6H PRN Filemon Jennings PA-C      metoprolol succinate  100 mg Oral Daily Filemon Jennings PA-C      ondansetron  4 mg Intravenous Q4H PRN Annette Flores PA-C      pantoprazole  40 mg Oral BID AC Jonelle Lou PA-C      pravastatin  40 mg Oral Daily With FABIOLA Nesbitt      senna  1 tablet Oral HS PRN Filemon Jennings PA-C      sevelamer  800 mg Oral TID With Meals John Valencia MD      tamsulosin  0 4 mg Oral Daily With Dinner Filemon Jennings PA-C      warfarin  2 5 mg Oral Once (warfarin) Cinthia Anderson DO         I updated his wife via phone     Today, Patient Was Seen By: Cinthia Anderson DO    ** Please Note: Dictation voice to text software may have been used in the creation of this document   **

## 2022-06-01 NOTE — ASSESSMENT & PLAN NOTE
Wt Readings from Last 3 Encounters:   06/01/22 102 kg (224 lb)   05/03/22 114 kg (251 lb)   04/29/22 113 kg (250 lb 1 6 oz)     · Prior echo 04/25/2022:  EF 79%, Diastolic function: grade 2 pseudonormal relaxation  · Monitor I/O and daily weights  · Does not take outpatient diuretics  IV Lasix with minimal results during prior admission    Patient had received albumin to help with anasarca  · Continue on  Fluid restriction, 2 g sodium

## 2022-06-01 NOTE — PLAN OF CARE
Problem: PAIN - ADULT  Goal: Verbalizes/displays adequate comfort level or baseline comfort level  Description: Interventions:  - Encourage patient to monitor pain and request assistance  - Assess pain using appropriate pain scale  - Administer analgesics based on type and severity of pain and evaluate response  - Implement non-pharmacological measures as appropriate and evaluate response  - Consider cultural and social influences on pain and pain management  - Notify physician/advanced practitioner if interventions unsuccessful or patient reports new pain  Outcome: Progressing     Problem: INFECTION - ADULT  Goal: Absence or prevention of progression during hospitalization  Description: INTERVENTIONS:  - Assess and monitor for signs and symptoms of infection  - Monitor lab/diagnostic results  - Monitor all insertion sites, i e  indwelling lines, tubes, and drains  - Monitor endotracheal if appropriate and nasal secretions for changes in amount and color  - [place] appropriate cooling/warming therapies per order  - Administer medications as ordered  - Instruct and encourage patient and family to use good hand hygiene technique  - Identify and instruct in appropriate isolation precautions for identified infection/condition  Outcome: Progressing     Problem: DISCHARGE PLANNING  Goal: Discharge to home or other facility with appropriate resources  Description: INTERVENTIONS:  - Identify barriers to discharge w/patient and caregiver  - Arrange for needed discharge resources and transportation as appropriate  - Identify discharge learning needs (meds, wound care, etc )  - Arrange for interpretive services to assist at discharge as needed  - Refer to Case Management Department for coordinating discharge planning if the patient needs post-hospital services based on physician/advanced practitioner order or complex needs related to functional status, cognitive ability, or social support system  Outcome: Progressing Problem: Knowledge Deficit  Goal: Patient/family/caregiver demonstrates understanding of disease process, treatment plan, medications, and discharge instructions  Description: Complete learning assessment and assess knowledge base  Interventions:  - Provide teaching at level of understanding  - Provide teaching via preferred learning methods  Outcome: Progressing     Problem: Prexisting or High Potential for Compromised Skin Integrity  Goal: Skin integrity is maintained or improved  Description: INTERVENTIONS:  - Identify patients at risk for skin breakdown  - Assess and monitor skin integrity  - Assess and monitor nutrition and hydration status  - Monitor labs   - Assess for incontinence   - Turn and reposition patient  - Assist with mobility/ambulation  - Relieve pressure over bony prominences  - Avoid friction and shearing  - Provide appropriate hygiene as needed including keeping skin clean and dry  - Evaluate need for skin moisturizer/barrier cream  - Collaborate with interdisciplinary team   - Patient/family teaching  - Consider wound care consult   Outcome: Progressing     Problem: Nutrition/Hydration-ADULT  Goal: Nutrient/Hydration intake appropriate for improving, restoring or maintaining nutritional needs  Description: Monitor and assess patient's nutrition/hydration status for malnutrition  Collaborate with interdisciplinary team and initiate plan and interventions as ordered  Monitor patient's weight and dietary intake as ordered or per policy  Utilize nutrition screening tool and intervene as necessary  Determine patient's food preferences and provide high-protein, high-caloric foods as appropriate       INTERVENTIONS:  - Monitor oral intake, urinary output, labs, and treatment plans  - Assess nutrition and hydration status and recommend course of action  - Evaluate amount of meals eaten  - Assist patient with eating if necessary   - Allow adequate time for meals  - Recommend/ encourage appropriate diets, oral nutritional supplements, and vitamin/mineral supplements  - Order, calculate, and assess calorie counts as needed  - Recommend, monitor, and adjust tube feedings and TPN/PPN based on assessed needs  - Assess need for intravenous fluids  - Provide specific nutrition/hydration education as appropriate  - Include patient/family/caregiver in decisions related to nutrition  Outcome: Progressing     Problem: METABOLIC, FLUID AND ELECTROLYTES - ADULT  Goal: Electrolytes maintained within normal limits  Description: INTERVENTIONS:  - Monitor labs and assess patient for signs and symptoms of electrolyte imbalances  - Administer electrolyte replacement as ordered  - Monitor response to electrolyte replacements, including repeat lab results as appropriate  - Instruct patient on fluid and nutrition as appropriate  Outcome: Progressing  Goal: Fluid balance maintained  Description: INTERVENTIONS:  - Monitor labs   - Monitor I/O and WT  - Instruct patient on fluid and nutrition as appropriate  - Assess for signs & symptoms of volume excess or deficit  Outcome: Progressing     Problem: SKIN/TISSUE INTEGRITY - ADULT  Goal: Skin Integrity remains intact(Skin Breakdown Prevention)  Description: Assess:  -Perform Matt assessment every shift  -Clean and moisturize skin every day  -Inspect skin when repositioning, toileting, and assisting with ADLS  -Assess under medical devices such as bracelets every shift  -Assess extremities for adequate circulation and sensation     Bed Management:  -Have minimal linens on bed & keep smooth, unwrinkled  -Change linens as needed when moist or perspiring  -Avoid sitting or lying in one position for more than 2 hours while in bed  -Keep HOB at 30 degrees     Toileting:  -Offer bedside commode  -Assess for incontinence every 2 hours  -Use incontinent care products after each incontinent episode such as barrier cream    Activity:  -Mobilize patient 3 times a day  -Encourage activity and walks on unit  -Encourage or provide ROM exercises   -Turn and reposition patient every 2 Hours  -Use appropriate equipment to lift or move patient in bed  -Instruct/ Assist with weight shifting every 3 hours when out of bed in chair  -Consider limitation of chair time 3 hour intervals    Skin Care:  -Avoid use of baby powder, tape, friction and shearing, hot water or constrictive clothing  -Relieve pressure over bony prominences using allevyn  -Do not massage red bony areas    Next Steps:  -Teach patient strategies to minimize risks such as shifting weight   -Consider consults to  interdisciplinary teams such as wound care  Outcome: Progressing  Goal: Incision(s), wounds(s) or drain site(s) healing without S/S of infection  Description: INTERVENTIONS  - Assess and document dressing, incision, wound bed, drain sites and surrounding tissue  - Provide patient and family education  - Perform skin care/dressing changes every day  Outcome: Progressing     Problem: HEMATOLOGIC - ADULT  Goal: Maintains hematologic stability  Description: INTERVENTIONS  - Assess for signs and symptoms of bleeding or hemorrhage  - Monitor labs  - Administer supportive blood products/factors as ordered and appropriate  Outcome: Progressing     Problem: MUSCULOSKELETAL - ADULT  Goal: Maintain or return mobility to safest level of function  Description: INTERVENTIONS:  - Assess patient's ability to carry out ADLs; assess patient's baseline for ADL function and identify physical deficits which impact ability to perform ADLs (bathing, care of mouth/teeth, toileting, grooming, dressing, etc )  - Assess/evaluate cause of self-care deficits   - Assess range of motion  - Assess patient's mobility  - Assess patient's need for assistive devices and provide as appropriate  - Encourage maximum independence but intervene and supervise when necessary  - Involve family in performance of ADLs  - Assess for home care needs following discharge   - Consider OT consult to assist with ADL evaluation and planning for discharge  - Provide patient education as appropriate  Outcome: Progressing     Problem: MOBILITY - ADULT  Goal: Maintain or return to baseline ADL function  Description: INTERVENTIONS:  -  Assess patient's ability to carry out ADLs; assess patient's baseline for ADL function and identify physical deficits which impact ability to perform ADLs (bathing, care of mouth/teeth, toileting, grooming, dressing, etc )  - Assess/evaluate cause of self-care deficits   - Assess range of motion  - Assess patient's mobility; develop plan if impaired  - Assess patient's need for assistive devices and provide as appropriate  - Encourage maximum independence but intervene and supervise when necessary  - Involve family in performance of ADLs  - Assess for home care needs following discharge   - Consider OT consult to assist with ADL evaluation and planning for discharge  - Provide patient education as appropriate  Outcome: Progressing  Goal: Maintains/Returns to pre admission functional level  Description: INTERVENTIONS:  - Perform BMAT or MOVE assessment daily    - Set and communicate daily mobility goal to care team and patient/family/caregiver  - Collaborate with rehabilitation services on mobility goals if consulted  - Perform Range of Motion 5 times a day  - Reposition patient every 3 hours    - Dangle patient 3 times a day  - Stand patient 3 times a day  - Ambulate patient 3 times a day  - Out of bed to chair 3 times a day   - Out of bed for meals 3 times a day  - Out of bed for toileting  - Record patient progress and toleration of activity level   Outcome: Progressing     Problem: SAFETY ADULT  Goal: Maintain or return to baseline ADL function  Description: INTERVENTIONS:  -  Assess patient's ability to carry out ADLs; assess patient's baseline for ADL function and identify physical deficits which impact ability to perform ADLs (bathing, care of mouth/teeth, toileting, grooming, dressing, etc )  - Assess/evaluate cause of self-care deficits   - Assess range of motion  - Assess patient's mobility; develop plan if impaired  - Assess patient's need for assistive devices and provide as appropriate  - Encourage maximum independence but intervene and supervise when necessary  - Involve family in performance of ADLs  - Assess for home care needs following discharge   - Consider OT consult to assist with ADL evaluation and planning for discharge  - Provide patient education as appropriate  Outcome: Progressing  Goal: Maintains/Returns to pre admission functional level  Description: INTERVENTIONS:  - Perform BMAT or MOVE assessment daily    - Set and communicate daily mobility goal to care team and patient/family/caregiver  - Collaborate with rehabilitation services on mobility goals if consulted  - Perform Range of Motion 5 times a day  - Reposition patient every 3 hours    - Dangle patient 3 times a day  - Stand patient 3 times a day  - Ambulate patient 3 times a day  - Out of bed to chair 3 times a day   - Out of bed for meals 3 times a day  - Out of bed for toileting  - Record patient progress and toleration of activity level   Outcome: Progressing  Goal: Patient will remain free of falls  Description: INTERVENTIONS:  - Educate patient/family on patient safety including physical limitations  - Instruct patient to call for assistance with activity   - Consult OT/PT to assist with strengthening/mobility   - Keep Call bell within reach  - Keep bed low and locked with side rails adjusted as appropriate  - Keep care items and personal belongings within reach  - Initiate and maintain comfort rounds  - Make Fall Risk Sign visible to staff  - Offer Toileting every 2 Hours, in advance of need  - Initiate/Maintain bed alarm  - Obtain necessary fall risk management equipment: non slip socks  - Apply yellow socks and bracelet for high fall risk patients  - Consider moving patient to room near nurses station  Outcome: Progressing

## 2022-06-01 NOTE — ASSESSMENT & PLAN NOTE
· S/p cervical fusion performed by Dr Nyasia Mix on 4/60/76, complicated by MRSA infection  · Completed 24 days of IV vancomycin, transitioned to daptomycin on 04/24-4/29    Discharged home on doxycycline 100 mg b i d    · Continue doxycycline  · Will need follow up with Neurosurgery as an outpatient-

## 2022-06-01 NOTE — PLAN OF CARE
Problem: PAIN - ADULT  Goal: Verbalizes/displays adequate comfort level or baseline comfort level  Description: Interventions:  - Encourage patient to monitor pain and request assistance  - Assess pain using appropriate pain scale  - Administer analgesics based on type and severity of pain and evaluate response  - Implement non-pharmacological measures as appropriate and evaluate response  - Consider cultural and social influences on pain and pain management  - Notify physician/advanced practitioner if interventions unsuccessful or patient reports new pain  Outcome: Progressing     Problem: INFECTION - ADULT  Goal: Absence or prevention of progression during hospitalization  Description: INTERVENTIONS:  - Assess and monitor for signs and symptoms of infection  - Monitor lab/diagnostic results  - Monitor all insertion sites, i e  indwelling lines, tubes, and drains  - Monitor endotracheal if appropriate and nasal secretions for changes in amount and color  - [place] appropriate cooling/warming therapies per order  - Administer medications as ordered  - Instruct and encourage patient and family to use good hand hygiene technique  - Identify and instruct in appropriate isolation precautions for identified infection/condition  Outcome: Progressing     Problem: DISCHARGE PLANNING  Goal: Discharge to home or other facility with appropriate resources  Description: INTERVENTIONS:  - Identify barriers to discharge w/patient and caregiver  - Arrange for needed discharge resources and transportation as appropriate  - Identify discharge learning needs (meds, wound care, etc )  - Arrange for interpretive services to assist at discharge as needed  - Refer to Case Management Department for coordinating discharge planning if the patient needs post-hospital services based on physician/advanced practitioner order or complex needs related to functional status, cognitive ability, or social support system  Outcome: Progressing Problem: Knowledge Deficit  Goal: Patient/family/caregiver demonstrates understanding of disease process, treatment plan, medications, and discharge instructions  Description: Complete learning assessment and assess knowledge base  Interventions:  - Provide teaching at level of understanding  - Provide teaching via preferred learning methods  Outcome: Progressing     Problem: Prexisting or High Potential for Compromised Skin Integrity  Goal: Skin integrity is maintained or improved  Description: INTERVENTIONS:  - Identify patients at risk for skin breakdown  - Assess and monitor skin integrity  - Assess and monitor nutrition and hydration status  - Monitor labs   - Assess for incontinence   - Turn and reposition patient  - Assist with mobility/ambulation  - Relieve pressure over bony prominences  - Avoid friction and shearing  - Provide appropriate hygiene as needed including keeping skin clean and dry  - Evaluate need for skin moisturizer/barrier cream  - Collaborate with interdisciplinary team   - Patient/family teaching  - Consider wound care consult   Outcome: Progressing     Problem: Nutrition/Hydration-ADULT  Goal: Nutrient/Hydration intake appropriate for improving, restoring or maintaining nutritional needs  Description: Monitor and assess patient's nutrition/hydration status for malnutrition  Collaborate with interdisciplinary team and initiate plan and interventions as ordered  Monitor patient's weight and dietary intake as ordered or per policy  Utilize nutrition screening tool and intervene as necessary  Determine patient's food preferences and provide high-protein, high-caloric foods as appropriate       INTERVENTIONS:  - Monitor oral intake, urinary output, labs, and treatment plans  - Assess nutrition and hydration status and recommend course of action  - Evaluate amount of meals eaten  - Assist patient with eating if necessary   - Allow adequate time for meals  - Recommend/ encourage appropriate diets, oral nutritional supplements, and vitamin/mineral supplements  - Order, calculate, and assess calorie counts as needed  - Recommend, monitor, and adjust tube feedings and TPN/PPN based on assessed needs  - Assess need for intravenous fluids  - Provide specific nutrition/hydration education as appropriate  - Include patient/family/caregiver in decisions related to nutrition  Outcome: Progressing     Problem: METABOLIC, FLUID AND ELECTROLYTES - ADULT  Goal: Electrolytes maintained within normal limits  Description: INTERVENTIONS:  - Monitor labs and assess patient for signs and symptoms of electrolyte imbalances  - Administer electrolyte replacement as ordered  - Monitor response to electrolyte replacements, including repeat lab results as appropriate  - Instruct patient on fluid and nutrition as appropriate  Outcome: Progressing  Goal: Fluid balance maintained  Description: INTERVENTIONS:  - Monitor labs   - Monitor I/O and WT  - Instruct patient on fluid and nutrition as appropriate  - Assess for signs & symptoms of volume excess or deficit  Outcome: Progressing     Problem: SKIN/TISSUE INTEGRITY - ADULT  Goal: Skin Integrity remains intact(Skin Breakdown Prevention)  Description: Assess:  -Perform Matt assessment every shift  -Clean and moisturize skin every day  -Inspect skin when repositioning, toileting, and assisting with ADLS  -Assess under medical devices such as bracelets every shift  -Assess extremities for adequate circulation and sensation     Bed Management:  -Have minimal linens on bed & keep smooth, unwrinkled  -Change linens as needed when moist or perspiring  -Avoid sitting or lying in one position for more than 2 hours while in bed  -Keep HOB at 30 degrees     Toileting:  -Offer bedside commode  -Assess for incontinence every 2 hours  -Use incontinent care products after each incontinent episode such as barrier cream    Activity:  -Mobilize patient 3 times a day  -Encourage activity and walks on unit  -Encourage or provide ROM exercises   -Turn and reposition patient every 2 Hours  -Use appropriate equipment to lift or move patient in bed  -Instruct/ Assist with weight shifting every 3 hours when out of bed in chair  -Consider limitation of chair time 3 hour intervals    Skin Care:  -Avoid use of baby powder, tape, friction and shearing, hot water or constrictive clothing  -Relieve pressure over bony prominences using allevyn  -Do not massage red bony areas    Next Steps:  -Teach patient strategies to minimize risks such as shifting weight   -Consider consults to  interdisciplinary teams such as wound care  Outcome: Progressing  Goal: Incision(s), wounds(s) or drain site(s) healing without S/S of infection  Description: INTERVENTIONS  - Assess and document dressing, incision, wound bed, drain sites and surrounding tissue  - Provide patient and family education  - Perform skin care/dressing changes every day  Outcome: Progressing     Problem: HEMATOLOGIC - ADULT  Goal: Maintains hematologic stability  Description: INTERVENTIONS  - Assess for signs and symptoms of bleeding or hemorrhage  - Monitor labs  - Administer supportive blood products/factors as ordered and appropriate  Outcome: Progressing     Problem: MUSCULOSKELETAL - ADULT  Goal: Maintain or return mobility to safest level of function  Description: INTERVENTIONS:  - Assess patient's ability to carry out ADLs; assess patient's baseline for ADL function and identify physical deficits which impact ability to perform ADLs (bathing, care of mouth/teeth, toileting, grooming, dressing, etc )  - Assess/evaluate cause of self-care deficits   - Assess range of motion  - Assess patient's mobility  - Assess patient's need for assistive devices and provide as appropriate  - Encourage maximum independence but intervene and supervise when necessary  - Involve family in performance of ADLs  - Assess for home care needs following discharge   - Consider OT consult to assist with ADL evaluation and planning for discharge  - Provide patient education as appropriate  Outcome: Progressing     Problem: MOBILITY - ADULT  Goal: Maintain or return to baseline ADL function  Description: INTERVENTIONS:  -  Assess patient's ability to carry out ADLs; assess patient's baseline for ADL function and identify physical deficits which impact ability to perform ADLs (bathing, care of mouth/teeth, toileting, grooming, dressing, etc )  - Assess/evaluate cause of self-care deficits   - Assess range of motion  - Assess patient's mobility; develop plan if impaired  - Assess patient's need for assistive devices and provide as appropriate  - Encourage maximum independence but intervene and supervise when necessary  - Involve family in performance of ADLs  - Assess for home care needs following discharge   - Consider OT consult to assist with ADL evaluation and planning for discharge  - Provide patient education as appropriate  Outcome: Progressing  Goal: Maintains/Returns to pre admission functional level  Description: INTERVENTIONS:  - Perform BMAT or MOVE assessment daily    - Set and communicate daily mobility goal to care team and patient/family/caregiver  - Collaborate with rehabilitation services on mobility goals if consulted  - Perform Range of Motion 5 times a day  - Reposition patient every 3 hours    - Dangle patient 3 times a day  - Stand patient 3 times a day  - Ambulate patient 3 times a day  - Out of bed to chair 3 times a day   - Out of bed for meals 3 times a day  - Out of bed for toileting  - Record patient progress and toleration of activity level   Outcome: Progressing     Problem: SAFETY ADULT  Goal: Maintain or return to baseline ADL function  Description: INTERVENTIONS:  -  Assess patient's ability to carry out ADLs; assess patient's baseline for ADL function and identify physical deficits which impact ability to perform ADLs (bathing, care of mouth/teeth, toileting, grooming, dressing, etc )  - Assess/evaluate cause of self-care deficits   - Assess range of motion  - Assess patient's mobility; develop plan if impaired  - Assess patient's need for assistive devices and provide as appropriate  - Encourage maximum independence but intervene and supervise when necessary  - Involve family in performance of ADLs  - Assess for home care needs following discharge   - Consider OT consult to assist with ADL evaluation and planning for discharge  - Provide patient education as appropriate  Outcome: Progressing  Goal: Maintains/Returns to pre admission functional level  Description: INTERVENTIONS:  - Perform BMAT or MOVE assessment daily    - Set and communicate daily mobility goal to care team and patient/family/caregiver  - Collaborate with rehabilitation services on mobility goals if consulted  - Perform Range of Motion 5 times a day  - Reposition patient every 3 hours    - Dangle patient 3 times a day  - Stand patient 3 times a day  - Ambulate patient 3 times a day  - Out of bed to chair 3 times a day   - Out of bed for meals 3 times a day  - Out of bed for toileting  - Record patient progress and toleration of activity level   Outcome: Progressing  Goal: Patient will remain free of falls  Description: INTERVENTIONS:  - Educate patient/family on patient safety including physical limitations  - Instruct patient to call for assistance with activity   - Consult OT/PT to assist with strengthening/mobility   - Keep Call bell within reach  - Keep bed low and locked with side rails adjusted as appropriate  - Keep care items and personal belongings within reach  - Initiate and maintain comfort rounds  - Make Fall Risk Sign visible to staff  - Offer Toileting every 2 Hours, in advance of need  - Initiate/Maintain bed alarm  - Obtain necessary fall risk management equipment: non slip socks  - Apply yellow socks and bracelet for high fall risk patients  - Consider moving patient to room near nurses station  Outcome: Progressing

## 2022-06-01 NOTE — OCCUPATIONAL THERAPY NOTE
Occupational Therapy Screen    Patient Name: Bg Thomas  ASZKQ'P Date: 6/1/2022 06/01/22 1315   OT Last Visit   OT Visit Date 06/01/22   Note Type   Note type Screen   Additional Comments OT orders received and chart reviewed  Pt was evaluated and discharged from OT services previously during admission  Spoke to pt who reports he is currently able to manage ADLs in his room  Pt has concerns regarding transitioning from RW to cane, as he did not use an assistive device prior to admission and need to care for his ill wife  Relayed this information to PT who plans to see patient for re-evaluation on 6/2  Recommend pt continue to be OOB for meals, ambulation to/from BR, perform self care tasks, and mobility in hallway with nursing  At this time, OT recommendations at time of discharge are return home at prior level of function  No acute OT needs identified at this time; please re-consult if OT needs arise during remainder of hospital stay       Patrick Vasques MS, OTR/L

## 2022-06-01 NOTE — ASSESSMENT & PLAN NOTE
· Potassium 2 8 on admission  · Then given 40 mEq p o  potassium and 20 mEq IV potassium -  · Stable today-continue on oral supplement  ·

## 2022-06-01 NOTE — ASSESSMENT & PLAN NOTE
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

## 2022-06-01 NOTE — ASSESSMENT & PLAN NOTE
· Patient has history of left lower extremity DVT after which he was diagnosed with factor 5 Leiden mutation  This happened years ago    · Held Coumadin prior to biopsy and was on IV heparin until 6 May 31, 2022  ·  INR 2 48 today-will give 2 5 mg dose today  · Daily INR -

## 2022-06-01 NOTE — ASSESSMENT & PLAN NOTE
· Patient has PAF  He denies palpitations    · Patient currently in  sinus rhythm-had prior ablation for a flutter according to old chart  · Continue Toprol  ·  Continue warfarin for anticoagulation

## 2022-06-02 ENCOUNTER — APPOINTMENT (INPATIENT)
Dept: INTERVENTIONAL RADIOLOGY/VASCULAR | Facility: HOSPITAL | Age: 72
DRG: 674 | End: 2022-06-02
Attending: RADIOLOGY
Payer: COMMERCIAL

## 2022-06-02 LAB
ANION GAP SERPL CALCULATED.3IONS-SCNC: 8 MMOL/L (ref 4–13)
BUN SERPL-MCNC: 57 MG/DL (ref 5–25)
CALCIUM SERPL-MCNC: 8.3 MG/DL (ref 8.3–10.1)
CHLORIDE SERPL-SCNC: 104 MMOL/L (ref 100–108)
CO2 SERPL-SCNC: 24 MMOL/L (ref 21–32)
CREAT SERPL-MCNC: 4.92 MG/DL (ref 0.6–1.3)
GFR SERPL CREATININE-BSD FRML MDRD: 10 ML/MIN/1.73SQ M
GLUCOSE SERPL-MCNC: 86 MG/DL (ref 65–140)
INR PPP: 2.52 (ref 0.84–1.19)
POTASSIUM SERPL-SCNC: 3.7 MMOL/L (ref 3.5–5.3)
PROTHROMBIN TIME: 26.5 SECONDS (ref 11.6–14.5)
SODIUM SERPL-SCNC: 136 MMOL/L (ref 136–145)

## 2022-06-02 PROCEDURE — 99233 SBSQ HOSP IP/OBS HIGH 50: CPT | Performed by: INTERNAL MEDICINE

## 2022-06-02 PROCEDURE — 36589 REMOVAL TUNNELED CV CATH: CPT | Performed by: RADIOLOGY

## 2022-06-02 PROCEDURE — 97164 PT RE-EVAL EST PLAN CARE: CPT

## 2022-06-02 PROCEDURE — NC001 PR NO CHARGE: Performed by: RADIOLOGY

## 2022-06-02 PROCEDURE — 85610 PROTHROMBIN TIME: CPT | Performed by: INTERNAL MEDICINE

## 2022-06-02 PROCEDURE — 97530 THERAPEUTIC ACTIVITIES: CPT

## 2022-06-02 PROCEDURE — 02PAX3Z REMOVAL OF INFUSION DEVICE FROM HEART, EXTERNAL APPROACH: ICD-10-PCS | Performed by: INTERNAL MEDICINE

## 2022-06-02 PROCEDURE — 80048 BASIC METABOLIC PNL TOTAL CA: CPT | Performed by: INTERNAL MEDICINE

## 2022-06-02 PROCEDURE — 36589 REMOVAL TUNNELED CV CATH: CPT

## 2022-06-02 RX ORDER — B-COMPLEX WITH VITAMIN C
1 TABLET ORAL
Status: DISCONTINUED | OUTPATIENT
Start: 2022-06-03 | End: 2022-06-04 | Stop reason: HOSPADM

## 2022-06-02 RX ORDER — PREDNISONE 20 MG/1
60 TABLET ORAL DAILY
Status: DISCONTINUED | OUTPATIENT
Start: 2022-06-02 | End: 2022-06-04 | Stop reason: HOSPADM

## 2022-06-02 RX ADMIN — AMLODIPINE BESYLATE 10 MG: 5 TABLET ORAL at 09:13

## 2022-06-02 RX ADMIN — PREDNISONE 60 MG: 20 TABLET ORAL at 12:45

## 2022-06-02 RX ADMIN — PRAVASTATIN SODIUM 40 MG: 40 TABLET ORAL at 15:44

## 2022-06-02 RX ADMIN — GABAPENTIN 100 MG: 100 CAPSULE ORAL at 09:13

## 2022-06-02 RX ADMIN — Medication 10 ML: at 15:31

## 2022-06-02 RX ADMIN — METOPROLOL SUCCINATE 100 MG: 50 TABLET, EXTENDED RELEASE ORAL at 09:13

## 2022-06-02 RX ADMIN — DULOXETINE 60 MG: 30 CAPSULE, DELAYED RELEASE ORAL at 09:13

## 2022-06-02 RX ADMIN — SEVELAMER HYDROCHLORIDE 800 MG: 800 TABLET, FILM COATED PARENTERAL at 11:57

## 2022-06-02 RX ADMIN — PANTOPRAZOLE SODIUM 40 MG: 40 TABLET, DELAYED RELEASE ORAL at 15:44

## 2022-06-02 RX ADMIN — GABAPENTIN 300 MG: 300 CAPSULE ORAL at 22:00

## 2022-06-02 RX ADMIN — SEVELAMER HYDROCHLORIDE 800 MG: 800 TABLET, FILM COATED PARENTERAL at 09:13

## 2022-06-02 RX ADMIN — PANTOPRAZOLE SODIUM 40 MG: 40 TABLET, DELAYED RELEASE ORAL at 06:10

## 2022-06-02 RX ADMIN — TAMSULOSIN HYDROCHLORIDE 0.4 MG: 0.4 CAPSULE ORAL at 15:44

## 2022-06-02 RX ADMIN — SEVELAMER HYDROCHLORIDE 800 MG: 800 TABLET, FILM COATED PARENTERAL at 15:44

## 2022-06-02 NOTE — SEDATION DOCUMENTATION
Orders from Nephrology to remove Permacath  Dr Izzy Meneses successfully removed at bedside  Hemostasis achieved with 10 mins of digital pressure  DSD applied  Patient encouraged to sit up with high INR

## 2022-06-02 NOTE — PHYSICAL THERAPY NOTE
PHYSICAL THERAPY Re-evaluation    Performed at least 2 patient identifiers during session:  Patient Active Problem List   Diagnosis    Status post catheter ablation of atrial flutter    Essential hypertension    WILL (obstructive sleep apnea)    Factor V Leiden mutation ([address] )    Erectile dysfunction    Depression, major, single episode, moderate (HCC)    Insomnia    Lumbar herniated disc    Primary osteoarthritis of left knee    Primary osteoarthritis of right knee    Paroxysmal A-fib ([address] )    Lower extremity edema    Mixed hyperlipidemia    History of DVT of lower extremity    IFG (impaired fasting glucose)    Pes anserinus bursitis of right knee    Cervical myelopathy (HCC)    Sinus bradycardia    Goals of care, counseling/discussion    Muscle spasm    Hyponatremia    Head pain cephalgia    Anemia    Surgical site infection    Status post cervical spinal fusion    Great toe pain, right    Ambulatory dysfunction    JANEL (acute kidney injury) ([address] )    Other proteinuria    Glomerulonephritis    Urinary frequency    Microscopic hematuria    Chronic diastolic (congestive) heart failure (HCC)    Hypokalemia    Hypomagnesemia       Past Medical History:   Diagnosis Date    Acute venous embolism and thrombosis of deep vessels of proximal lower extremity (HCC)     Last assessed: 5/18/15    Arthritis     Cellulitis     LE    Chronic kidney disease 3/2020    Acute Kidney Injury    CPAP (continuous positive airway pressure) dependence     Factor V Leiden ([address] )     Forgetfulness     Gross hematuria 5/17/2022    Headache(784 0) 3/2022    Never till cervical  spine surgery    Heart murmur 3/2020    Told when in hospital    [facility] (hard of hearing)     Hypoalbuminemia 5/11/2022    Paroxysmal atrial fibrillation ([address] )     Last assessed: 11/2/15    Sleep apnea        Past Surgical History:   Procedure Laterality Date    BACK SURGERY      Lower    COLON SURGERY      COLONOSCOPY      INCISION AND DRAINAGE POSTERIOR SPINE N/A 4/1/2022    Procedure: Posterior cervical evacuation of postoperative collection and debridement with placement of drains C3-T1; Surgeon: Reena Tamez MD;  Location: [facility];  Service: Neurosurgery    IR BIOPSY KIDNEY RANDOM  5/26/2022    IR TUNNELED DIALYSIS CATHETER PLACEMENT  5/23/2022    NM ARTHRODESIS ANT INTERBODY MIN DISCECTOMY, CERVICAL BELOW C2 N/A 3/11/2022    Procedure: Anterior cervical discectomy and fixation fusion C5/6 and C6/7; Posterior cervical decompression and instrumented fusion C3-T1;   Surgeon: Reena Tamez MD;  Location: [facility];  Service: Neurosurgery    SPINE SURGERY  3/11/2022    Cervical myelopathy/ cervical fusion        06/02/22 1152   PT Last Visit   PT Visit Date 06/02/22   Note Type   Note type Re-Evaluation   Pain Assessment   Pain Assessment Tool 0-10   Pain Score No Pain   Restrictions/Precautions   Weight Bearing Precautions Per Order No   Braces or Orthoses C/S Collar   Home Living   Additional Comments See PT IE   Prior Function   Level of [place] Independent with ADLs and functional mobility   Comments See PT IE   General   Family/Caregiver Present No   Cognition   Overall Cognitive Status WFL   Arousal/Participation Alert   Attention Within functional limits   Orientation Level Oriented X4   Memory Within functional limits   Following Commands Follows all commands and directions without difficulty   Subjective   Subjective Pt states he has been walking the halls, 3 laps/day;  pt agreeable to trial steps this session   RUE Assessment   RUE Assessment WFL   LUE Assessment   LUE Assessment WFL   RLE Assessment   RLE Assessment WFL   LLE Assessment   LLE Assessment WFL   Bed Mobility   Supine to Sit 6  Modified independent   Sit to Supine 6  Modified independent   Transfers   Sit to Stand 6  Modified independent   Stand to Sit 6  Modified independent   Ambulation/Elevation Gait pattern Forward Flexion;Narrow AZRA   Gait Assistance 6  Modified independent   Assistive Device Rolling walker   Distance 300ft   Stair Management Assistance 6  Modified independent   Additional items Assist x 1   Stair Management Technique Two rails   Number of Stairs 6   Balance   Static Sitting Good   Dynamic Sitting Fair +   Static Standing Fair -  (RW)   Dynamic Standing Fair -  (RW)   Ambulatory Fair -  (RW)   Activity Tolerance   Activity Tolerance Patient tolerated treatment well   Nurse Made Aware [place]   Assessment   Prognosis Good   Assessment Re-Evaluation performed as new PT orders were placed; Pt continues to be functioning at baseline with use of RW for distance mobility and SPC or no AD for short distances; Pt able to ambulate in halls and up/down steps without assistance or cueing; No need for skilled PT; safe for DC home when medically ready; will DC PT order, encourage ambulation TID and OOB for all meals  The patient's AM-PAC Basic Mobility Inpatient Short Form Raw Score is 24  A Raw score of greater than 17 suggests the patient may benefit from discharge to home  Please also refer to the recommendation of the Physical Therapist for safe discharge planning     Goals   Patient Goals to go home   Recommendation   PT Discharge Recommendation No rehabilitation needs  (pt at baseline for mobility with RW for distances, uses SPC or no AD in room/short distances)   AM-PAC Basic Mobility Inpatient   Turning in Bed Without Bedrails 4   Lying on Back to Sitting on Edge of Flat Bed 4   Moving Bed to Chair 4   Standing Up From Chair 4   Walk in Room 4   Climb 3-5 Stairs 4   Basic Mobility Inpatient Raw Score 24   Basic Mobility Standardized Score 57 68   Highest Level Of Mobility   JH-HLM Goal 8: Walk 250 feet or more   JH-HLM Achieved 8: Walk 250 feet ot more   Bijal Rosario, PT            Patient Name: Frandy Hickey  [facility] Date: 6/2/2022

## 2022-06-02 NOTE — CONSULTS
e-Consult (IPC)  - Interventional Radiology  Mukund Larkin 71 y o  male MRN: [id number]  Unit/Bed#: -01 Encounter: [id number]          Interventional Radiology has been consulted to evaluate [address] to IR  Consult performed by: Addis Leonard MD  Consult ordered by: Zunilda Sung PA-C        06/02/22    Assessment/Recommendation:   80-year-old male with acute kidney injury status post PermCath placement last week  Patient suspected to have ATN and IgA post infectious flomerulonephritis  Patient will be started on high-dose steroids  Given concerns for increased susceptibility to infection, patient is referred for removal of PermCath  There are no plans for dialysis at this moment  - plan for tunneled hemodialysis catheter removal today  - Discussed w/ Dr Marquis Machado      Total time spent in review of data, discussion with requesting provider and rendering advice was 10 min  Thank you for allowing Interventional Radiology to participate in the care of Mukund Larkin  Please don't hesitate to call or TigerText us with any questions       Addis Leonard MD

## 2022-06-02 NOTE — BRIEF OP NOTE (RAD/CATH)
INTERVENTIONAL RADIOLOGY PROCEDURE NOTE    Date: 6/2/2022    Procedure: Tunneled HD catheter removal    Preoperative diagnosis:   1  JANEL (acute kidney injury) ([facility] )    2  Surgical site infection    3  Paroxysmal A-fib ([facility] )    4  Gross hematuria    5  Status post catheter ablation of atrial flutter    6  Status post cervical spinal fusion         Postoperative diagnosis: Same  Surgeon: Romana Martinez MD     Assistant: None  No qualified resident was available  Blood loss: 1 mL    Specimens: None     Findings: Successful right IJV tunneled HD catheter removal     Complications: None immediate      Anesthesia: local

## 2022-06-02 NOTE — PROGRESS NOTES
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  -Admission creatinine 5 04 and has remained unchanged  Creatinine today 4 9  -Peak creatinine 5 5  -baseline creatinine 0 6-0 9  -1st rise in creatinine noted 4/18/2022 as outpatient with sCr 1 88, plateaued in high 3s prior to previous discharge   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 w/no renal biopsy  -workup: UA with 3+ protein, 100 glucose, large blood, innumerable RBC, 10-20 WBC, 2-4 hyaline casts  -previous serologies:  Hepatitis panel, C3/C4, anti double stranded DNA, ANCA, ZAK, GBM ab normal   SPEP/UPEP w/no monoclonal bands   -anti HUGH 2R pending  Collected 5/19/22  -Imaging:  Renal ultrasound 4/28/2022 with no hydronephrosis, normally distended bladder   Normal echogenicity  -renal biopsy 5/26: IgA, likely secondary in setting of MRSA infection  1 fibrocellular crescent, 2/8 glomerular globally sclerosed, moderate scarring  +potocyte effacement present raising concern for other superimposed condition  -nonoliguric    -Plan:  · Renal function stable but creatinine stalled  · renal biopsy as noted above  Although potocyte effacement, 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  · Recommend initiating steroids  Will start prednisone 60 mg p o  Daily  No pulse dosing  D/w Dr Stephanie Phelps (ID) by Dr Jesus Tafoya and ok to start steroids from ID perspective  · Continue PPI    No hx of PUD or GI bleed  · Will add calcium and vitamin-D supplement  · Discussed at length with patient and wife via phone today  All questions answered  The both express understanding and wish to proceed with steroid treatment  · Clinically, patient is not uremic and there is no acute indication for renal replacement therapy (dialysis)  · Will remove Permcath  D/w IR and IR consulted  INR 2 5 today and appropriate for PermCath removal   · Check anti HUGH 2R, currently pending  · Strict I/Os, daily weights  · Avoid nephrotoxins, NSAIDs, IV contrast if possible  · Avoid hypotension   Maintain MAP >65  · Trend BMP  · Adjust meds to appropriate GFR  · Optimize hemodynamic status to avoid delay in renal recovery  · D/w Dr Jona Moeller  · d/w Dr Patricio     Nephrotic range proteinuria:  -suspected due to acute glomerulonephritis  -most recent UPCr 12 on 5/5/22, improved from 18  -serology workup unrevealing as noted above  -continue to monitor     Hypertension/Volume status:  -BP stable and acceptable  -clinically, examines euvolemic  -Home medications:  Amlodipine 5 mg daily, Toprol- mg daily  -Current medications:  Amlodipine 10 mg daily, doxazosin 1 mg q h s  Toprol  mg daily    -holding further diuretics  -Optimize hemodynamic status to avoid delay in renal recovery  -recommend hold parameters on antihypertensive's for SBP <130 mmHg  -Avoid hypotension or fluctuations in blood pressure   Maintain MAP >65  -continue to trend     Hypokalemia:  -stable  -K+ 3 7 today  -continue to monitor    Hyperphosphatemia:  -remains on Renagel 1 tab t i d  With meals  -recently calcium containing binders were discontinued setting of hypercalcemia  -most recent phosphorus 4 5 on 5/31/2022  -continue to monitor    Hypercalcemia:  -improved  -current calcium 8 3, corrected 10 0  -previously avoid calcium supplements or products  Calcium supplementation indicated in the setting of steroid use    Will monitor closely  -continue to monitor    Microscopic hematuria:  -possibly in setting of postinfectious GN  -hx of intermittent gross hematuria during hospitalization  Resolved   -was followed by urology  -negative serologies  -continue to monitor     Anemia:  -Hgb 8 8  Stable but remains below goal  Goal >10  -no schistocytes  -Iron studies:  Iron 41, ferritin 521, iron saturation 20%, TIBC 204  -previous SPEP/UPEP with no monoclonal bands  -avoid EARLE due to underlying factor 5 Leiden deficiency with VTE hx  -continue to trend  -Transfuse for Hgb <7 0  -management per primary medical service     MRSA surgical site infection w/OM:  -cervical fusion 3/11/2022 c/b MRSA surgical site infection, osteomyelitis  -s/p debridement washout 4/1/2022  -previously on vancomycin--> daptomycin course completed 4/29/2022--> on doxycycline until 6/1  Now d/c'd since am dose 6/1   -antibiotics as per ID previous recommendation and per primary medical service  -surgical site management per Neurosurgery as outpatient     Factor V Leiden deficiency:  -hx associated DVT  -on chronic Coumadin for factor V Leiden and atrial fibrillation  -INR 2 5 today  -avoid EARLE therapy due to contraindication     Chronic diastolic heart failure:  -echo 4/25/22: EF 55% with grade 2 diastolic pseudo normal relaxation   IVC normal  -intermittent Lasix dosing during current and previous hospitalizations  -clinically, exam euvolemic  -continue to monitor off diuretics     SUBJECTIVE:  Patient awake, alert without complaints  Creatinine remains unchanged at 4 9  Adequate urine output  No uremic symptoms  Renal biopsy as noted above    Off doxycycline since 06/01 a m  VSS    OBJECTIVE:  Current Weight: Weight - Scale: 101 kg (223 lb 12 3 oz)  Vitals:    06/01/22 2349 06/02/22 0549 06/02/22 0750 06/02/22 0900   BP:   154/74    Pulse: 55  59    Resp:   16    Temp:   97 9 °F (36 6 °C)    TempSrc:       SpO2: 97% 95% 98% 98%   Weight:  101 kg (223 lb 12 3 oz)     Height:           Intake/Output Summary (Last 24 hours) at 6/2/2022 Carmela Urena 51 filed at 6/2/2022 1042  Gross per 24 hour   Intake 701 ml   Output 1800 ml   Net -1099 ml     General:  Awake, alert, appears comfortable and in no acute distress  Nontoxic  Skin:  No rash, warm, good skin turgor   Eyes:  PERRL, EOMI, sclerae nonicteric   no periorbital edema   ENT:  Moist mucous membranes  Neck:  Trachea midline, symmetric  No JVD  Chest:  Clear to auscultation bilaterally without wheezes, crackles or rhonchi  CVS:  Regular rate and rhythm without murmur, gallop or rub  S1 and S2 identified and normal   No S3, S4    Abdomen:  Soft, nontender, nondistended without masses  Normal bowel sounds x 4 quadrants  No bruit  Extremities:  Warm, pink, motor and sensory intact and well perfused  No cyanosis, pallor  no BLE edema  Chronic venous stasis skin changes bilateral lower extremities  , unchanged  Neuro:  Awake, alert, oriented x3  Grossly intact  Psych:  Appropriate affect  Mentating appropriately  Normal mental status exam  Access:  R IJV PermCath site clear with dressing intact  No erythema, induration or drainage        Medications:    Current Facility-Administered Medications:     acetaminophen (TYLENOL) tablet 650 mg, 650 mg, Oral, Q6H PRN, Katie Gonzalez PA-C, 650 mg at 05/28/22 2133    amLODIPine (NORVASC) tablet 10 mg, 10 mg, Oral, Daily, Dario Covington PA-C, 10 mg at 06/02/22 0913    doxazosin (CARDURA) tablet 1 mg, 1 mg, Oral, HS, Pheobe Brendan, CRNP, 1 mg at 06/01/22 2146    DULoxetine (CYMBALTA) delayed release capsule 60 mg, 60 mg, Oral, Daily, Jonelle Lou PA-C, 60 mg at 06/02/22 0913    gabapentin (NEURONTIN) capsule 100 mg, 100 mg, Oral, Daily, Jonelle Lou PA-C, 100 mg at 06/02/22 0913    gabapentin (NEURONTIN) capsule 300 mg, 300 mg, Oral, HS, Jonelle Lou PA-C, 300 mg at 06/01/22 2146    hydrALAZINE (APRESOLINE) injection 10 mg, 10 mg, Intravenous, Q6H PRN, Ruben Palmer DO    lidocaine (LIDODERM) 5 % patch 1 patch, 1 patch, Topical, Daily PRN, Pedro Cassidy PA-C, 1 patch at 05/17/22 0032    lidocaine (LIDODERM) 5 % patch 2 patch, 2 patch, Topical, Daily, Mak Farmer DO, 2 patch at 05/26/22 0947    methocarbamol (ROBAXIN) tablet 750 mg, 750 mg, Oral, Q6H PRN, Katie Gonzalez PA-C, 750 mg at 05/28/22 2133    metoprolol succinate (TOPROL-XL) 24 hr tablet 100 mg, 100 mg, Oral, Daily, Jonelle Lou PA-C, 100 mg at 06/02/22 0913    ondansetron (ZOFRAN) injection 4 mg, 4 mg, Intravenous, Q4H PRN, Annette Flores PA-C, 4 mg at 05/27/22 1045    pantoprazole (PROTONIX) EC tablet 40 mg, 40 mg, Oral, BID AC, Jonelle Lou PA-C, 40 mg at 06/02/22 0610    pravastatin (PRAVACHOL) tablet 40 mg, 40 mg, Oral, Daily With Dinner, Katie Gonzalez PA-C, 40 mg at 06/01/22 1728    sevelamer (RENAGEL) tablet 800 mg, 800 mg, Oral, TID With Meals, Thomas Don MD, 800 mg at 06/02/22 0913    tamsulosin (FLOMAX) capsule 0 4 mg, 0 4 mg, Oral, Daily With Neil Caballero PA-C, 0 4 mg at 06/01/22 1728    Laboratory Results:  Results from last 7 days   Lab Units 06/02/22  0545 06/01/22  1042 06/01/22  0557 05/31/22  0438 05/30/22  0513 05/29/22  0536 05/28/22  1817 05/28/22  0313 05/27/22  1938 05/27/22  1015 05/27/22  0219 05/27/22  0049   WBC Thousand/uL  --  6 03  --   --  5 54 5 28  --  5 33  --   --   --  5 21   HEMOGLOBIN g/dL  --  8 9*  --   --  7 5* 8 1* 8 2* 7 1* 7 9* 7 7*   < > 6 4*   HEMATOCRIT %  --  28 5*  --   --  23 2* 25 5* 25 4* 23 0* 24 5* 24 2*   < > 20 2*   PLATELETS Thousands/uL  --  194  --   --  151 134*  --  131*  --   --   --  136*   SODIUM mmol/L 136  --  135* 136 135* 136  --  135*  --   --   --  136   POTASSIUM mmol/L 3 7  --  3 9 3 7 3 3* 3 3*  --  3 3*  --   --   --  3 6   CHLORIDE mmol/L 104  --  102 103 102 102  --  101  --   --   --  103   CO2 mmol/L 24  --  24 23 24 26  --  22  --   --   --  26   BUN mg/dL 57*  --  53* 52* 57* 54*  --  58*  --   --   --  60*   CREATININE mg/dL 4 92*  --  4 69* 4 61* 4 78* 4 65*  --  4 72*  --   --   --  5 02*   CALCIUM mg/dL 8 3  --  8 5 8 2* 8 2* 8 2*  --  8 4  --   --   --  8 4   MAGNESIUM mg/dL  --   --  2 2 1 3*  --   --   --   --   --   --   --   --    PHOSPHORUS mg/dL  --   --   --  4 5*  --   --   --   --   --   --   --   --     < > = values in this interval not displayed  I have personally reviewed the blood work as stated above and in my note  I have personally reviewed internal Medicine, co-consultants and previous nephrology notes

## 2022-06-02 NOTE — PLAN OF CARE
Problem: PAIN - ADULT  Goal: Verbalizes/displays adequate comfort level or baseline comfort level  Description: Interventions:  - Encourage patient to monitor pain and request assistance  - Assess pain using appropriate pain scale  - Administer analgesics based on type and severity of pain and evaluate response  - Implement non-pharmacological measures as appropriate and evaluate response  - Consider cultural and social influences on pain and pain management  - Notify physician/advanced practitioner if interventions unsuccessful or patient reports new pain  Outcome: Progressing     Problem: INFECTION - ADULT  Goal: Absence or prevention of progression during hospitalization  Description: INTERVENTIONS:  - Assess and monitor for signs and symptoms of infection  - Monitor lab/diagnostic results  - Monitor all insertion sites, i e  indwelling lines, tubes, and drains  - Monitor endotracheal if appropriate and nasal secretions for changes in amount and color  - [place] appropriate cooling/warming therapies per order  - Administer medications as ordered  - Instruct and encourage patient and family to use good hand hygiene technique  - Identify and instruct in appropriate isolation precautions for identified infection/condition  Outcome: Progressing     Problem: DISCHARGE PLANNING  Goal: Discharge to home or other facility with appropriate resources  Description: INTERVENTIONS:  - Identify barriers to discharge w/patient and caregiver  - Arrange for needed discharge resources and transportation as appropriate  - Identify discharge learning needs (meds, wound care, etc )  - Arrange for interpretive services to assist at discharge as needed  - Refer to Case Management Department for coordinating discharge planning if the patient needs post-hospital services based on physician/advanced practitioner order or complex needs related to functional status, cognitive ability, or social support system  Outcome: Progressing Problem: Knowledge Deficit  Goal: Patient/family/caregiver demonstrates understanding of disease process, treatment plan, medications, and discharge instructions  Description: Complete learning assessment and assess knowledge base  Interventions:  - Provide teaching at level of understanding  - Provide teaching via preferred learning methods  Outcome: Progressing     Problem: Prexisting or High Potential for Compromised Skin Integrity  Goal: Skin integrity is maintained or improved  Description: INTERVENTIONS:  - Identify patients at risk for skin breakdown  - Assess and monitor skin integrity  - Assess and monitor nutrition and hydration status  - Monitor labs   - Assess for incontinence   - Turn and reposition patient  - Assist with mobility/ambulation  - Relieve pressure over bony prominences  - Avoid friction and shearing  - Provide appropriate hygiene as needed including keeping skin clean and dry  - Evaluate need for skin moisturizer/barrier cream  - Collaborate with interdisciplinary team   - Patient/family teaching  - Consider wound care consult   Outcome: Progressing     Problem: Nutrition/Hydration-ADULT  Goal: Nutrient/Hydration intake appropriate for improving, restoring or maintaining nutritional needs  Description: Monitor and assess patient's nutrition/hydration status for malnutrition  Collaborate with interdisciplinary team and initiate plan and interventions as ordered  Monitor patient's weight and dietary intake as ordered or per policy  Utilize nutrition screening tool and intervene as necessary  Determine patient's food preferences and provide high-protein, high-caloric foods as appropriate       INTERVENTIONS:  - Monitor oral intake, urinary output, labs, and treatment plans  - Assess nutrition and hydration status and recommend course of action  - Evaluate amount of meals eaten  - Assist patient with eating if necessary   - Allow adequate time for meals  - Recommend/ encourage appropriate diets, oral nutritional supplements, and vitamin/mineral supplements  - Order, calculate, and assess calorie counts as needed  - Recommend, monitor, and adjust tube feedings and TPN/PPN based on assessed needs  - Assess need for intravenous fluids  - Provide specific nutrition/hydration education as appropriate  - Include patient/family/caregiver in decisions related to nutrition  Outcome: Progressing     Problem: METABOLIC, FLUID AND ELECTROLYTES - ADULT  Goal: Electrolytes maintained within normal limits  Description: INTERVENTIONS:  - Monitor labs and assess patient for signs and symptoms of electrolyte imbalances  - Administer electrolyte replacement as ordered  - Monitor response to electrolyte replacements, including repeat lab results as appropriate  - Instruct patient on fluid and nutrition as appropriate  Outcome: Progressing  Goal: Fluid balance maintained  Description: INTERVENTIONS:  - Monitor labs   - Monitor I/O and WT  - Instruct patient on fluid and nutrition as appropriate  - Assess for signs & symptoms of volume excess or deficit  Outcome: Progressing     Problem: SKIN/TISSUE INTEGRITY - ADULT  Goal: Skin Integrity remains intact(Skin Breakdown Prevention)  Description: Assess:  -Perform Matt assessment every shift  -Clean and moisturize skin every day  -Inspect skin when repositioning, toileting, and assisting with ADLS  -Assess under medical devices such as bracelets every shift  -Assess extremities for adequate circulation and sensation     Bed Management:  -Have minimal linens on bed & keep smooth, unwrinkled  -Change linens as needed when moist or perspiring  -Avoid sitting or lying in one position for more than 2 hours while in bed  -Keep HOB at 30 degrees     Toileting:  -Offer bedside commode  -Assess for incontinence every 2 hours  -Use incontinent care products after each incontinent episode such as barrier cream    Activity:  -Mobilize patient 3 times a day  -Encourage activity and walks on unit  -Encourage or provide ROM exercises   -Turn and reposition patient every 2 Hours  -Use appropriate equipment to lift or move patient in bed  -Instruct/ Assist with weight shifting every 3 hours when out of bed in chair  -Consider limitation of chair time 3 hour intervals    Skin Care:  -Avoid use of baby powder, tape, friction and shearing, hot water or constrictive clothing  -Relieve pressure over bony prominences using allevyn  -Do not massage red bony areas    Next Steps:  -Teach patient strategies to minimize risks such as shifting weight   -Consider consults to  interdisciplinary teams such as wound care  Outcome: Progressing  Goal: Incision(s), wounds(s) or drain site(s) healing without S/S of infection  Description: INTERVENTIONS  - Assess and document dressing, incision, wound bed, drain sites and surrounding tissue  - Provide patient and family education  - Perform skin care/dressing changes every day  Outcome: Progressing     Problem: HEMATOLOGIC - ADULT  Goal: Maintains hematologic stability  Description: INTERVENTIONS  - Assess for signs and symptoms of bleeding or hemorrhage  - Monitor labs  - Administer supportive blood products/factors as ordered and appropriate  Outcome: Progressing     Problem: MUSCULOSKELETAL - ADULT  Goal: Maintain or return mobility to safest level of function  Description: INTERVENTIONS:  - Assess patient's ability to carry out ADLs; assess patient's baseline for ADL function and identify physical deficits which impact ability to perform ADLs (bathing, care of mouth/teeth, toileting, grooming, dressing, etc )  - Assess/evaluate cause of self-care deficits   - Assess range of motion  - Assess patient's mobility  - Assess patient's need for assistive devices and provide as appropriate  - Encourage maximum independence but intervene and supervise when necessary  - Involve family in performance of ADLs  - Assess for home care needs following discharge   - Consider OT consult to assist with ADL evaluation and planning for discharge  - Provide patient education as appropriate  Outcome: Progressing     Problem: MOBILITY - ADULT  Goal: Maintain or return to baseline ADL function  Description: INTERVENTIONS:  -  Assess patient's ability to carry out ADLs; assess patient's baseline for ADL function and identify physical deficits which impact ability to perform ADLs (bathing, care of mouth/teeth, toileting, grooming, dressing, etc )  - Assess/evaluate cause of self-care deficits   - Assess range of motion  - Assess patient's mobility; develop plan if impaired  - Assess patient's need for assistive devices and provide as appropriate  - Encourage maximum independence but intervene and supervise when necessary  - Involve family in performance of ADLs  - Assess for home care needs following discharge   - Consider OT consult to assist with ADL evaluation and planning for discharge  - Provide patient education as appropriate  Outcome: Progressing  Goal: Maintains/Returns to pre admission functional level  Description: INTERVENTIONS:  - Perform BMAT or MOVE assessment daily    - Set and communicate daily mobility goal to care team and patient/family/caregiver  - Collaborate with rehabilitation services on mobility goals if consulted  - Perform Range of Motion 5 times a day  - Reposition patient every 3 hours    - Dangle patient 3 times a day  - Stand patient 3 times a day  - Ambulate patient 3 times a day  - Out of bed to chair 3 times a day   - Out of bed for meals 3 times a day  - Out of bed for toileting  - Record patient progress and toleration of activity level   Outcome: Progressing     Problem: SAFETY ADULT  Goal: Maintain or return to baseline ADL function  Description: INTERVENTIONS:  -  Assess patient's ability to carry out ADLs; assess patient's baseline for ADL function and identify physical deficits which impact ability to perform ADLs (bathing, care of mouth/teeth, toileting, grooming, dressing, etc )  - Assess/evaluate cause of self-care deficits   - Assess range of motion  - Assess patient's mobility; develop plan if impaired  - Assess patient's need for assistive devices and provide as appropriate  - Encourage maximum independence but intervene and supervise when necessary  - Involve family in performance of ADLs  - Assess for home care needs following discharge   - Consider OT consult to assist with ADL evaluation and planning for discharge  - Provide patient education as appropriate  Outcome: Progressing  Goal: Maintains/Returns to pre admission functional level  Description: INTERVENTIONS:  - Perform BMAT or MOVE assessment daily    - Set and communicate daily mobility goal to care team and patient/family/caregiver  - Collaborate with rehabilitation services on mobility goals if consulted  - Perform Range of Motion 5 times a day  - Reposition patient every 3 hours    - Dangle patient 3 times a day  - Stand patient 3 times a day  - Ambulate patient 3 times a day  - Out of bed to chair 3 times a day   - Out of bed for meals 3 times a day  - Out of bed for toileting  - Record patient progress and toleration of activity level   Outcome: Progressing  Goal: Patient will remain free of falls  Description: INTERVENTIONS:  - Educate patient/family on patient safety including physical limitations  - Instruct patient to call for assistance with activity   - Consult OT/PT to assist with strengthening/mobility   - Keep Call bell within reach  - Keep bed low and locked with side rails adjusted as appropriate  - Keep care items and personal belongings within reach  - Initiate and maintain comfort rounds  - Make Fall Risk Sign visible to staff  - Offer Toileting every 2 Hours, in advance of need  - Initiate/Maintain bed alarm  - Obtain necessary fall risk management equipment: non slip socks  - Apply yellow socks and bracelet for high fall risk patients  - Consider moving patient to room near nurses station  Outcome: Progressing

## 2022-06-02 NOTE — PROGRESS NOTES
Pastoral Care Progress Note    2022  Patient: Sophy Beard : 1950  Admission Date & Time: 5/10/2022 1851  MRN: [id number] 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

## 2022-06-03 PROBLEM — N02.8 IGA NEPHROPATHY DETERMINED BY BIOPSY OF KIDNEY: Status: ACTIVE | Noted: 2022-06-03

## 2022-06-03 PROBLEM — E83.39 HYPERPHOSPHATEMIA: Status: ACTIVE | Noted: 2022-06-03

## 2022-06-03 PROBLEM — N02.B9 IGA NEPHROPATHY DETERMINED BY BIOPSY OF KIDNEY: Status: ACTIVE | Noted: 2022-06-03

## 2022-06-03 PROBLEM — E83.52 HYPERCALCEMIA: Status: ACTIVE | Noted: 2022-06-03

## 2022-06-03 PROBLEM — E83.42 HYPOMAGNESEMIA: Status: RESOLVED | Noted: 2022-05-11 | Resolved: 2022-06-03

## 2022-06-03 PROBLEM — E87.6 HYPOKALEMIA: Status: RESOLVED | Noted: 2022-05-10 | Resolved: 2022-06-03

## 2022-06-03 PROBLEM — F32.1 DEPRESSION, MAJOR, SINGLE EPISODE, MODERATE (HCC): Status: RESOLVED | Noted: 2017-10-30 | Resolved: 2022-06-03

## 2022-06-03 LAB
ALBUMIN SERPL BCP-MCNC: 2.1 G/DL (ref 3.5–5)
ALP SERPL-CCNC: 81 U/L (ref 46–116)
ALT SERPL W P-5'-P-CCNC: 9 U/L (ref 12–78)
ANION GAP SERPL CALCULATED.3IONS-SCNC: 10 MMOL/L (ref 4–13)
AST SERPL W P-5'-P-CCNC: 13 U/L (ref 5–45)
BILIRUB SERPL-MCNC: 0.3 MG/DL (ref 0.2–1)
BUN SERPL-MCNC: 66 MG/DL (ref 5–25)
CALCIUM ALBUM COR SERPL-MCNC: 10.3 MG/DL (ref 8.3–10.1)
CALCIUM SERPL-MCNC: 8.8 MG/DL (ref 8.3–10.1)
CHLORIDE SERPL-SCNC: 103 MMOL/L (ref 100–108)
CO2 SERPL-SCNC: 22 MMOL/L (ref 21–32)
CREAT SERPL-MCNC: 5.34 MG/DL (ref 0.6–1.3)
GFR SERPL CREATININE-BSD FRML MDRD: 9 ML/MIN/1.73SQ M
GLUCOSE SERPL-MCNC: 143 MG/DL (ref 65–140)
GLUCOSE SERPL-MCNC: 179 MG/DL (ref 65–140)
GLUCOSE SERPL-MCNC: 180 MG/DL (ref 65–140)
GLUCOSE SERPL-MCNC: 181 MG/DL (ref 65–140)
INR PPP: 2.31 (ref 0.84–1.19)
PHOSPHATE SERPL-MCNC: 5.5 MG/DL (ref 2.3–4.1)
POTASSIUM SERPL-SCNC: 4.1 MMOL/L (ref 3.5–5.3)
PROT SERPL-MCNC: 5.9 G/DL (ref 6.4–8.2)
PROTHROMBIN TIME: 24.9 SECONDS (ref 11.6–14.5)
SODIUM SERPL-SCNC: 135 MMOL/L (ref 136–145)

## 2022-06-03 PROCEDURE — 82948 REAGENT STRIP/BLOOD GLUCOSE: CPT

## 2022-06-03 PROCEDURE — 85610 PROTHROMBIN TIME: CPT | Performed by: INTERNAL MEDICINE

## 2022-06-03 PROCEDURE — 99232 SBSQ HOSP IP/OBS MODERATE 35: CPT | Performed by: INTERNAL MEDICINE

## 2022-06-03 PROCEDURE — 80053 COMPREHEN METABOLIC PANEL: CPT | Performed by: PHYSICIAN ASSISTANT

## 2022-06-03 PROCEDURE — 84100 ASSAY OF PHOSPHORUS: CPT | Performed by: INTERNAL MEDICINE

## 2022-06-03 RX ORDER — WARFARIN SODIUM 5 MG/1
5 TABLET ORAL
Status: COMPLETED | OUTPATIENT
Start: 2022-06-03 | End: 2022-06-03

## 2022-06-03 RX ORDER — SULFAMETHOXAZOLE AND TRIMETHOPRIM 800; 160 MG/1; MG/1
1 TABLET ORAL 3 TIMES WEEKLY
Status: DISCONTINUED | OUTPATIENT
Start: 2022-06-03 | End: 2022-06-04 | Stop reason: HOSPADM

## 2022-06-03 RX ADMIN — SEVELAMER HYDROCHLORIDE 800 MG: 800 TABLET, FILM COATED PARENTERAL at 13:42

## 2022-06-03 RX ADMIN — PANTOPRAZOLE SODIUM 40 MG: 40 TABLET, DELAYED RELEASE ORAL at 16:30

## 2022-06-03 RX ADMIN — SULFAMETHOXAZOLE AND TRIMETHOPRIM 1 TABLET: 800; 160 TABLET ORAL at 13:42

## 2022-06-03 RX ADMIN — WARFARIN SODIUM 5 MG: 5 TABLET ORAL at 17:46

## 2022-06-03 RX ADMIN — SEVELAMER HYDROCHLORIDE 800 MG: 800 TABLET, FILM COATED PARENTERAL at 09:33

## 2022-06-03 RX ADMIN — AMLODIPINE BESYLATE 10 MG: 5 TABLET ORAL at 09:33

## 2022-06-03 RX ADMIN — Medication 1 TABLET: at 09:33

## 2022-06-03 RX ADMIN — DOXAZOSIN 1 MG: 1 TABLET ORAL at 22:04

## 2022-06-03 RX ADMIN — DULOXETINE 60 MG: 30 CAPSULE, DELAYED RELEASE ORAL at 09:32

## 2022-06-03 RX ADMIN — SEVELAMER HYDROCHLORIDE 800 MG: 800 TABLET, FILM COATED PARENTERAL at 16:30

## 2022-06-03 RX ADMIN — PREDNISONE 60 MG: 20 TABLET ORAL at 09:32

## 2022-06-03 RX ADMIN — GABAPENTIN 100 MG: 100 CAPSULE ORAL at 09:32

## 2022-06-03 RX ADMIN — METOPROLOL SUCCINATE 100 MG: 50 TABLET, EXTENDED RELEASE ORAL at 09:32

## 2022-06-03 RX ADMIN — TAMSULOSIN HYDROCHLORIDE 0.4 MG: 0.4 CAPSULE ORAL at 16:30

## 2022-06-03 RX ADMIN — PRAVASTATIN SODIUM 40 MG: 40 TABLET ORAL at 16:30

## 2022-06-03 RX ADMIN — GABAPENTIN 300 MG: 300 CAPSULE ORAL at 22:05

## 2022-06-03 RX ADMIN — PANTOPRAZOLE SODIUM 40 MG: 40 TABLET, DELAYED RELEASE ORAL at 09:32

## 2022-06-03 NOTE — ASSESSMENT & PLAN NOTE
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

## 2022-06-03 NOTE — PLAN OF CARE
Problem: PAIN - ADULT  Goal: Verbalizes/displays adequate comfort level or baseline comfort level  Description: Interventions:  - Encourage patient to monitor pain and request assistance  - Assess pain using appropriate pain scale  - Administer analgesics based on type and severity of pain and evaluate response  - Implement non-pharmacological measures as appropriate and evaluate response  - Consider cultural and social influences on pain and pain management  - Notify physician/advanced practitioner if interventions unsuccessful or patient reports new pain  Outcome: Progressing     Problem: INFECTION - ADULT  Goal: Absence or prevention of progression during hospitalization  Description: INTERVENTIONS:  - Assess and monitor for signs and symptoms of infection  - Monitor lab/diagnostic results  - Monitor all insertion sites, i e  indwelling lines, tubes, and drains  - Monitor endotracheal if appropriate and nasal secretions for changes in amount and color  - [place] appropriate cooling/warming therapies per order  - Administer medications as ordered  - Instruct and encourage patient and family to use good hand hygiene technique  - Identify and instruct in appropriate isolation precautions for identified infection/condition  Outcome: Progressing     Problem: DISCHARGE PLANNING  Goal: Discharge to home or other facility with appropriate resources  Description: INTERVENTIONS:  - Identify barriers to discharge w/patient and caregiver  - Arrange for needed discharge resources and transportation as appropriate  - Identify discharge learning needs (meds, wound care, etc )  - Arrange for interpretive services to assist at discharge as needed  - Refer to Case Management Department for coordinating discharge planning if the patient needs post-hospital services based on physician/advanced practitioner order or complex needs related to functional status, cognitive ability, or social support system  Outcome: Progressing Problem: Knowledge Deficit  Goal: Patient/family/caregiver demonstrates understanding of disease process, treatment plan, medications, and discharge instructions  Description: Complete learning assessment and assess knowledge base  Interventions:  - Provide teaching at level of understanding  - Provide teaching via preferred learning methods  Outcome: Progressing     Problem: Prexisting or High Potential for Compromised Skin Integrity  Goal: Skin integrity is maintained or improved  Description: INTERVENTIONS:  - Identify patients at risk for skin breakdown  - Assess and monitor skin integrity  - Assess and monitor nutrition and hydration status  - Monitor labs   - Assess for incontinence   - Turn and reposition patient  - Assist with mobility/ambulation  - Relieve pressure over bony prominences  - Avoid friction and shearing  - Provide appropriate hygiene as needed including keeping skin clean and dry  - Evaluate need for skin moisturizer/barrier cream  - Collaborate with interdisciplinary team   - Patient/family teaching  - Consider wound care consult   Outcome: Progressing     Problem: Nutrition/Hydration-ADULT  Goal: Nutrient/Hydration intake appropriate for improving, restoring or maintaining nutritional needs  Description: Monitor and assess patient's nutrition/hydration status for malnutrition  Collaborate with interdisciplinary team and initiate plan and interventions as ordered  Monitor patient's weight and dietary intake as ordered or per policy  Utilize nutrition screening tool and intervene as necessary  Determine patient's food preferences and provide high-protein, high-caloric foods as appropriate       INTERVENTIONS:  - Monitor oral intake, urinary output, labs, and treatment plans  - Assess nutrition and hydration status and recommend course of action  - Evaluate amount of meals eaten  - Assist patient with eating if necessary   - Allow adequate time for meals  - Recommend/ encourage appropriate diets, oral nutritional supplements, and vitamin/mineral supplements  - Order, calculate, and assess calorie counts as needed  - Recommend, monitor, and adjust tube feedings and TPN/PPN based on assessed needs  - Assess need for intravenous fluids  - Provide specific nutrition/hydration education as appropriate  - Include patient/family/caregiver in decisions related to nutrition  Outcome: Progressing     Problem: METABOLIC, FLUID AND ELECTROLYTES - ADULT  Goal: Electrolytes maintained within normal limits  Description: INTERVENTIONS:  - Monitor labs and assess patient for signs and symptoms of electrolyte imbalances  - Administer electrolyte replacement as ordered  - Monitor response to electrolyte replacements, including repeat lab results as appropriate  - Instruct patient on fluid and nutrition as appropriate  Outcome: Progressing  Goal: Fluid balance maintained  Description: INTERVENTIONS:  - Monitor labs   - Monitor I/O and WT  - Instruct patient on fluid and nutrition as appropriate  - Assess for signs & symptoms of volume excess or deficit  Outcome: Progressing     Problem: SKIN/TISSUE INTEGRITY - ADULT  Goal: Skin Integrity remains intact(Skin Breakdown Prevention)  Description: Assess:  -Perform Matt assessment every shift  -Clean and moisturize skin every day  -Inspect skin when repositioning, toileting, and assisting with ADLS  -Assess under medical devices such as bracelets every shift  -Assess extremities for adequate circulation and sensation     Bed Management:  -Have minimal linens on bed & keep smooth, unwrinkled  -Change linens as needed when moist or perspiring  -Avoid sitting or lying in one position for more than 2 hours while in bed  -Keep HOB at 30 degrees     Toileting:  -Offer bedside commode  -Assess for incontinence every 2 hours  -Use incontinent care products after each incontinent episode such as barrier cream    Activity:  -Mobilize patient 3 times a day  -Encourage activity and walks on unit  -Encourage or provide ROM exercises   -Turn and reposition patient every 2 Hours  -Use appropriate equipment to lift or move patient in bed  -Instruct/ Assist with weight shifting every 3 hours when out of bed in chair  -Consider limitation of chair time 3 hour intervals    Skin Care:  -Avoid use of baby powder, tape, friction and shearing, hot water or constrictive clothing  -Relieve pressure over bony prominences using allevyn  -Do not massage red bony areas    Next Steps:  -Teach patient strategies to minimize risks such as shifting weight   -Consider consults to  interdisciplinary teams such as wound care  Outcome: Progressing  Goal: Incision(s), wounds(s) or drain site(s) healing without S/S of infection  Description: INTERVENTIONS  - Assess and document dressing, incision, wound bed, drain sites and surrounding tissue  - Provide patient and family education  - Perform skin care/dressing changes every day  Outcome: Progressing     Problem: HEMATOLOGIC - ADULT  Goal: Maintains hematologic stability  Description: INTERVENTIONS  - Assess for signs and symptoms of bleeding or hemorrhage  - Monitor labs  - Administer supportive blood products/factors as ordered and appropriate  Outcome: Progressing     Problem: MUSCULOSKELETAL - ADULT  Goal: Maintain or return mobility to safest level of function  Description: INTERVENTIONS:  - Assess patient's ability to carry out ADLs; assess patient's baseline for ADL function and identify physical deficits which impact ability to perform ADLs (bathing, care of mouth/teeth, toileting, grooming, dressing, etc )  - Assess/evaluate cause of self-care deficits   - Assess range of motion  - Assess patient's mobility  - Assess patient's need for assistive devices and provide as appropriate  - Encourage maximum independence but intervene and supervise when necessary  - Involve family in performance of ADLs  - Assess for home care needs following discharge   - Consider OT consult to assist with ADL evaluation and planning for discharge  - Provide patient education as appropriate  Outcome: Progressing     Problem: MOBILITY - ADULT  Goal: Maintain or return to baseline ADL function  Description: INTERVENTIONS:  -  Assess patient's ability to carry out ADLs; assess patient's baseline for ADL function and identify physical deficits which impact ability to perform ADLs (bathing, care of mouth/teeth, toileting, grooming, dressing, etc )  - Assess/evaluate cause of self-care deficits   - Assess range of motion  - Assess patient's mobility; develop plan if impaired  - Assess patient's need for assistive devices and provide as appropriate  - Encourage maximum independence but intervene and supervise when necessary  - Involve family in performance of ADLs  - Assess for home care needs following discharge   - Consider OT consult to assist with ADL evaluation and planning for discharge  - Provide patient education as appropriate  Outcome: Progressing  Goal: Maintains/Returns to pre admission functional level  Description: INTERVENTIONS:  - Perform BMAT or MOVE assessment daily    - Set and communicate daily mobility goal to care team and patient/family/caregiver  - Collaborate with rehabilitation services on mobility goals if consulted  - Perform Range of Motion 5 times a day  - Reposition patient every 3 hours    - Dangle patient 3 times a day  - Stand patient 3 times a day  - Ambulate patient 3 times a day  - Out of bed to chair 3 times a day   - Out of bed for meals 3 times a day  - Out of bed for toileting  - Record patient progress and toleration of activity level   Outcome: Progressing     Problem: SAFETY ADULT  Goal: Maintain or return to baseline ADL function  Description: INTERVENTIONS:  -  Assess patient's ability to carry out ADLs; assess patient's baseline for ADL function and identify physical deficits which impact ability to perform ADLs (bathing, care of mouth/teeth, toileting, grooming, dressing, etc )  - Assess/evaluate cause of self-care deficits   - Assess range of motion  - Assess patient's mobility; develop plan if impaired  - Assess patient's need for assistive devices and provide as appropriate  - Encourage maximum independence but intervene and supervise when necessary  - Involve family in performance of ADLs  - Assess for home care needs following discharge   - Consider OT consult to assist with ADL evaluation and planning for discharge  - Provide patient education as appropriate  Outcome: Progressing  Goal: Maintains/Returns to pre admission functional level  Description: INTERVENTIONS:  - Perform BMAT or MOVE assessment daily    - Set and communicate daily mobility goal to care team and patient/family/caregiver  - Collaborate with rehabilitation services on mobility goals if consulted  - Perform Range of Motion 5 times a day  - Reposition patient every 3 hours    - Dangle patient 3 times a day  - Stand patient 3 times a day  - Ambulate patient 3 times a day  - Out of bed to chair 3 times a day   - Out of bed for meals 3 times a day  - Out of bed for toileting  - Record patient progress and toleration of activity level   Outcome: Progressing  Goal: Patient will remain free of falls  Description: INTERVENTIONS:  - Educate patient/family on patient safety including physical limitations  - Instruct patient to call for assistance with activity   - Consult OT/PT to assist with strengthening/mobility   - Keep Call bell within reach  - Keep bed low and locked with side rails adjusted as appropriate  - Keep care items and personal belongings within reach  - Initiate and maintain comfort rounds  - Make Fall Risk Sign visible to staff  - Offer Toileting every 2 Hours, in advance of need  - Initiate/Maintain bed alarm  - Obtain necessary fall risk management equipment: non slip socks  - Apply yellow socks and bracelet for high fall risk patients  - Consider moving patient to room near nurses station  Outcome: Progressing

## 2022-06-03 NOTE — PROGRESS NOTES
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 due to recent infection issues    Factor V Leiden mutation [facility])  Assessment & Plan  · Patient has history of left lower extremity DVT after which he was diagnosed with factor 5 Leiden mutation  This happened years ago  · Held Coumadin prior to biopsy and was on IV heparin until  May 31, 2022  ·  -will give5 mg dose today  · Daily INR -      * JANEL (acute kidney injury) [facility])  Assessment & Plan  · Patient admitted with acute kidney injury felt to be most likely due to post infectious  glomerulonephritis  ·  Dr Ting Avilez recommending cervical collar remain in place for at least additional 2 weeks  · Renal biopsy performed 5/26-results -being reviewed by Nephrology-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 postinfectious-await Dr Gusman Pall opinion  · Peak creatinine of 5 6- then 4 61 on 5/31 - up to 5/34 today  ·  dialysis catherter was removed by ir yesterday  ·  now day 2 of prednisone - tolerating wll- if glucose and is stble will plan for discharge tomorrow    Chronic diastolic (congestive) heart failure (HCC)  Assessment & Plan  Wt Readings from Last 3 Encounters:   06/03/22 103 kg (226 lb)   05/03/22 114 kg (251 lb)   04/29/22 113 kg (250 lb 1 6 oz)     · Prior echo 04/25/2022:  EF 84%, Diastolic function: grade 2 pseudonormal relaxation  · Monitor I/O and daily weights  · Does not take outpatient diuretics  IV Lasix with minimal results during prior admission    Patient had received albumin to help with anasarca  · Continue on  Fluid restriction, 2 g sodium- no signs of fluid overload    Mixed hyperlipidemia  Assessment & Plan  · Continue Pravachol 40 mg daily    Paroxysmal A-fib (HCC)  Assessment & Plan  · Patient has PAF  He denies palpitations  · Patient currently in  sinus rhythm-had prior ablation for a flutter according to old chart  · Continue Toprol  ·  Continue warfarin for anticoagulation    Essential hypertension  Assessment & Plan  · Home regimen:  Amlodipine 5 mg daily and metoprolol succinate 100 mg daily  · Amlodipine had been increased to 10 mg daily during this admission  ·  Hydralazine discontinued per Nephrology given low blood pressures  · Blood pressure stable  130/68    Surgical site infection  Assessment & Plan  · S/p cervical fusion performed by Dr Soledad Siegel on , complicated by MRSA infection  · Completed 24 days of IV vancomycin, transitioned to daptomycin on -  Discharged home on doxycycline 100 mg b i d    · Now off doxycyline course as per id team   · Will need follow up with Neurosurgery as an outpatient-             VTE Prophylaxis: in place    Patient Centered Rounds: I rounded with patient's nurse    Current Length of Stay: 24 day(s)    Current Patient Status: Inpatient    Certification Statement: Pt requires additional inpatient hospital stay due to: see assessment and plan        Subjective:    Overall feeling stable-denies any nausea or abdominal pain  Has been having bowel movements which are more formed  With improvement is anxious to get home-understands plans are for discharge tomorrow if stable    All other ROS are negative    Objective:     Vitals:   Temp (24hrs), Av 9 °F (36 6 °C), Min:97 8 °F (36 6 °C), Max:98 °F (36 7 °C)    Temp:  [97 8 °F (36 6 °C)-98 °F (36 7 °C)] 97 8 °F (36 6 °C)  HR:  [51-63] 63  Resp:  [18] 18  BP: (120-157)/(62-70) 157/70  SpO2:  [95 %-99 %] 99 %  Body mass index is 31 52 kg/m²       Input and Output Summary (last 24 hours): Intake/Output Summary (Last 24 hours) at 6/3/2022 0852  Last data filed at 6/3/2022 0842  Gross per 24 hour   Intake 937 ml   Output 1850 ml   Net -913 ml       Physical Exam:     Physical Exam  Vitals and nursing note reviewed  Constitutional:       General: He is not in acute distress  Appearance: He is not toxic-appearing  Neck:      Comments: Cervical collar inplace  Cardiovascular:      Rate and Rhythm: Normal rate and regular rhythm  Heart sounds: No murmur heard  Pulmonary:      Effort: No respiratory distress  Abdominal:      General: There is no distension  Palpations: Abdomen is soft  Tenderness: There is no abdominal tenderness  Musculoskeletal:         General: No deformity  Neurological:      Mental Status: He is alert  Psychiatric:         Mood and Affect: Mood normal          Thought Content: Thought content normal          Judgment: Judgment normal              I personally reviewed labs and imaging reports for today        Last 24 Hours Medication List:   Current Facility-Administered Medications   Medication Dose Route Frequency Provider Last Rate    acetaminophen  650 mg Oral Q6H PRN Ernie Valladares, PA-C      amLODIPine  10 mg Oral Daily Chaz Knapp PA-C      calcium carbonate-vitamin D  1 tablet Oral Daily With Breakfast Zenaida Francisco PA-C      doxazosin  1 mg Oral HS DEEPTHI Romero      DULoxetine  60 mg Oral Daily Tammiorrah Jacquelyn, PA-ALTAGRACIA      gabapentin  100 mg Oral Daily Deborrah Jacquelyn, PA-ALTAGRACIA      gabapentin  300 mg Oral HS Ernie Valladares PA-C      hydrALAZINE  10 mg Intravenous Q6H PRN Fisher Jeffrey, DO      lidocaine  1 patch Topical Daily PRN Alcus Hum, PA-C      lidocaine  2 patch Topical Daily Mak Farmer, DO      methocarbamol  750 mg Oral Q6H PRN Tammiorrah Jacquelyn, PA-ALTAGRACIA      metoprolol succinate  100 mg Oral Daily Deborrah Jacquelyn, PA-C      ondansetron  4 mg Intravenous Q4H PRN Annette Flores PA-C      pantoprazole  40 mg Oral BID YOBANI Sal FABIOLA Lou      pravastatin  40 mg Oral Daily With FABIOLA Nesbitt      predniSONE  60 mg Oral Daily [facility], FABIOLA      sevelamer  800 mg Oral TID With Meals Gianfranco Vicente MD      tamsulosin  0 4 mg Oral Daily With Dinner Moreno Willis PA-C      warfarin  5 mg Oral Once (warfarin) Julissa Vale DO            Today, Patient Was Seen By: Julissa Vale DO    ** Please Note: Dictation voice to text software may have been used in the creation of this document   **

## 2022-06-03 NOTE — PROGRESS NOTES
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  -Admission creatinine 5 04 and has remained unchanged   Creatinine today 5 5  -Peak creatinine 5 5  -baseline creatinine 0 6-0 9  -1st rise in creatinine noted 4/18/2022 as outpatient with sCr 1 88, plateaued in high 3s prior to previous discharge   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 w/no renal biopsy  -workup: UA with 3+ protein, 100 glucose, large blood, innumerable RBC, 10-20 WBC, 2-4 hyaline casts  -previous serologies:  Hepatitis panel, C3/C4, anti double stranded DNA, ANCA, ZAK, GBM ab normal   SPEP/UPEP w/no monoclonal bands   -anti HUGH 2R pending  Collected 5/19/22  -Imaging:  Renal ultrasound 4/28/2022 with no hydronephrosis, normally distended bladder   Normal echogenicity  -renal biopsy 5/26: IgA, likely secondary in setting of MRSA infection  1 fibrocellular crescent, 2/8 glomerular globally sclerosed, moderate scarring  +potocyte effacement present raising concern for other superimposed condition  -nonoliguric    -Plan:  · Renal function stable  Creat stalled around 5 since admission  · renal biopsy as noted above  Although potocyte effacement, 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  · Continue prednisone 60 mg p o  daily  cleared by ID to start steroids  Started 6/2/22  · Continue PPI  No hx of PUD or GI bleed  · Continue calcium and vitamin-D supplement    Follow calcium levels closely  · Will start Bactrim DS MWF for PCP prophylaxis  · Clinically, patient is not uremic and there is no acute indication for renal replacement therapy (dialysis)  · S/p Permcath removal 6/2     · Check anti HUGH 2R, currently pending  · Strict I/Os, daily weights  · Avoid nephrotoxins, NSAIDs, IV contrast if possible  · Avoid hypotension   Maintain MAP >65  · Trend BMP  · Adjust meds to appropriate GFR  · Optimize hemodynamic status to avoid delay in renal recovery  · Tentative plan for hospital discharge tomorrow, 6/4/2022, if remains medically stable  Will plan outpatient follow-up next week with Dr Sonia Calderon with CMP, phosphorus, UPC ratio, UA w/micro  Continue prednisone 60 mg daily, PPI,daily Bactrim 3x/week and calcium/vitamin-D supplements on discharge  · Discussed at length with patient and wife via phone today  All questions answered  They both express understanding  · D/w Dr Rivka Smith  · d/w Dr Covarrubias     Nephrotic range proteinuria:  -suspected due to acute glomerulonephritis  -most recent UPCr 12 on 5/5/22, improved from 18  -serology workup unrevealing as noted above  -continue to monitor     Hypertension/Volume status:  -BP stable and acceptable  -clinically, examines euvolemic  -Home medications:  Amlodipine 5 mg daily, Toprol- mg daily  -Current medications:  Amlodipine 10 mg daily, doxazosin 1 mg q h s  Toprol  mg daily    -holding further diuretics  -Optimize hemodynamic status to avoid delay in renal recovery  -recommend hold parameters on antihypertensive's for SBP <130 mmHg  -Avoid hypotension or fluctuations in blood pressure   Maintain MAP >65  -continue to trend     Hypokalemia:  -stable  -K+ 4 1today  -continue to monitor     Hyperphosphatemia:  -remains on Renagel 1 tab t i d   With meals  -recently calcium containing binders were discontinued setting of hypercalcemia  -most recent phosphorus 5 5 on 6/3/2022  -continue to monitor     Hypercalcemia:  -stable  -current calcium 8 8, corrected 10 3  -previously avoided calcium supplements or products  Calcium supplementation indicated in the setting of steroid use  Will monitor closely  -continue to monitor  -plan for CMP in 1 week after hospital discharge     Microscopic hematuria:  -possibly in setting of postinfectious GN  -hx of intermittent gross hematuria during hospitalization  Resolved   -was followed by urology  -negative serologies  -continue to monitor     Anemia:  -Hgb 8 9  Stable but remains below goal  Goal >10  -no schistocytes  -Iron studies:  Iron 41, ferritin 521, iron saturation 20%, TIBC 204  -previous SPEP/UPEP with no monoclonal bands  -avoid EARLE due to underlying factor 5 Leiden deficiency with VTE hx  -continue to trend  -Transfuse for Hgb <7 0  -management per primary medical service     MRSA surgical site infection w/OM:  -cervical fusion 3/11/2022 c/b MRSA surgical site infection, osteomyelitis  -s/p debridement washout 4/1/2022  -previously on vancomycin--> daptomycin course completed 4/29/2022--> on doxycycline until 6/1  Now d/c'd since am dose 6/1   -f/u with ID   -surgical site management per Neurosurgery as outpatient     Factor V Leiden deficiency:  -hx associated DVT  -on chronic Coumadin for factor V Leiden and atrial fibrillation  -INR 2 31 today  -avoid EARLE therapy due to contraindication  -Coumadin per primary medical service     Chronic diastolic heart failure:  -echo 4/25/22: EF 55% with grade 2 diastolic pseudo normal relaxation   IVC normal  -intermittent Lasix dosing during current and previous hospitalizations  -clinically, exam euvolemic  -continue to monitor off diuretics       SUBJECTIVE:  Patient awake, alert without complaints  Creatinine to 5 5 today  Adequate urine output  s/p PermCath removal yesterday  Denies edema    VSS    OBJECTIVE:  Current Weight: Weight - Scale: 103 kg (226 lb)  Vitals:    06/02/22 2157 06/02/22 2200 06/03/22 0600 06/03/22 0725   BP: 120/62   157/70   Pulse: 62   63 Resp:    18   Temp: 97 9 °F (36 6 °C)   97 8 °F (36 6 °C)   TempSrc:       SpO2: 95% 97%  99%   Weight:   103 kg (226 lb)    Height:           Intake/Output Summary (Last 24 hours) at 6/3/2022 1136  Last data filed at 6/3/2022 1045  Gross per 24 hour   Intake 1394 ml   Output 1650 ml   Net -256 ml     General:  Awake, alert, appears comfortable and in no acute distress  Nontoxic  Skin:  No rash, warm, good skin turgor   Eyes:  PERRL, EOMI, sclerae nonicteric   no periorbital edema   ENT:  Moist mucous membranes  Neck:  Trachea midline, symmetric  No JVD  Cervical collar in place  Chest:  Clear to auscultation bilaterally without wheezes, crackles or rhonchi  CVS:  Regular rate and rhythm without murmur, gallop or rub  S1 and S2 identified and normal   No S3, S4    Abdomen:  Soft, nontender, nondistended without masses  Normal bowel sounds x 4 quadrants  No bruit  Extremities:  Warm, pink, motor and sensory intact and well perfused  No cyanosis, pallor  No BLE edema  Neuro:  Awake, alert, oriented x3  Grossly intact  Psych:  Appropriate affect  Mentating appropriately    Normal mental status exam      Medications:    Current Facility-Administered Medications:     acetaminophen (TYLENOL) tablet 650 mg, 650 mg, Oral, Q6H PRN, Anny Hayes PA-C, 650 mg at 05/28/22 2133    amLODIPine (NORVASC) tablet 10 mg, 10 mg, Oral, Daily, Gordo Awad PA-C, 10 mg at 06/03/22 9203    calcium carbonate-vitamin D (OSCAL-D) 500 mg-200 units per tablet 1 tablet, 1 tablet, Oral, Daily With Breakfast, Jorge Roblero PA-C, 1 tablet at 06/03/22 0933    doxazosin (CARDURA) tablet 1 mg, 1 mg, Oral, HS, DEEPTHI Springer, 1 mg at 06/01/22 2146    DULoxetine (CYMBALTA) delayed release capsule 60 mg, 60 mg, Oral, Daily, Jonelle Lou PA-C, 60 mg at 06/03/22 0932    gabapentin (NEURONTIN) capsule 100 mg, 100 mg, Oral, Daily, Jonelle Lou PA-C, 100 mg at 06/03/22 0932    gabapentin (NEURONTIN) capsule 300 mg, 300 mg, Oral, HS, Leticia El PA-C, 300 mg at 06/02/22 2200    hydrALAZINE (APRESOLINE) injection 10 mg, 10 mg, Intravenous, Q6H PRN, Ricky Louis DO    lidocaine (LIDODERM) 5 % patch 1 patch, 1 patch, Topical, Daily PRN, Annette Flores PA-C, 1 patch at 05/17/22 0032    lidocaine (LIDODERM) 5 % patch 2 patch, 2 patch, Topical, Daily, Mak Farmer DO, 2 patch at 05/26/22 0947    methocarbamol (ROBAXIN) tablet 750 mg, 750 mg, Oral, Q6H PRN, Leticia El PA-C, 750 mg at 05/28/22 2133    metoprolol succinate (TOPROL-XL) 24 hr tablet 100 mg, 100 mg, Oral, Daily, Jonelle Lou PA-C, 100 mg at 06/03/22 0932    ondansetron (ZOFRAN) injection 4 mg, 4 mg, Intravenous, Q4H PRN, Annette Flores PA-C, 4 mg at 05/27/22 1045    pantoprazole (PROTONIX) EC tablet 40 mg, 40 mg, Oral, BID AC, Jonelle Lou PA-C, 40 mg at 06/03/22 0932    pravastatin (PRAVACHOL) tablet 40 mg, 40 mg, Oral, Daily With Dinner, Leticia El PA-C, 40 mg at 06/02/22 1544    predniSONE tablet 60 mg, 60 mg, Oral, Daily, Shavonne Silverio PA-C, 60 mg at 06/03/22 0932    sevelamer (RENAGEL) tablet 800 mg, 800 mg, Oral, TID With Meals, Daphnie Bruce MD, 800 mg at 06/03/22 0933    tamsulosin (FLOMAX) capsule 0 4 mg, 0 4 mg, Oral, Daily With Dinner, Leticia El PA-C, 0 4 mg at 06/02/22 1544    warfarin (COUMADIN) tablet 5 mg, 5 mg, Oral, Once (warfarin), Jeff Chavez DO    Laboratory Results:  Results from last 7 days   Lab Units 06/03/22  0604 06/02/22  0545 06/01/22  1042 06/01/22  0557 05/31/22  0438 05/30/22  0513 05/29/22  0536 05/28/22  1817 05/28/22  0313 05/27/22  1938   WBC Thousand/uL  --   --  6 03  --   --  5 54 5 28  --  5 33  --    HEMOGLOBIN g/dL  --   --  8 9*  --   --  7 5* 8 1* 8 2* 7 1* 7 9*   HEMATOCRIT %  --   --  28 5*  --   --  23 2* 25 5* 25 4* 23 0* 24 5*   PLATELETS Thousands/uL  --   --  194  --   --  151 134*  --  131*  --    SODIUM mmol/L 135* 136  --  135* 136 135* 136  --  135*  --    POTASSIUM mmol/L 4 1 3 7  --  3 9 3 7 3 3* 3 3*  --  3 3*  -- CHLORIDE mmol/L 103 104  --  102 103 102 102  --  101  --    CO2 mmol/L 22 24  --  24 23 24 26  --  22  --    BUN mg/dL 66* 57*  --  53* 52* 57* 54*  --  58*  --    CREATININE mg/dL 5 34* 4 92*  --  4 69* 4 61* 4 78* 4 65*  --  4 72*  --    CALCIUM mg/dL 8 8 8 3  --  8 5 8 2* 8 2* 8 2*  --  8 4  --    MAGNESIUM mg/dL  --   --   --  2 2 1 3*  --   --   --   --   --    PHOSPHORUS mg/dL 5 5*  --   --   --  4 5*  --   --   --   --   --        I have personally reviewed the blood work as stated above and in my note  I have personally reviewed internal Medicine, co-consultants and previous nephrology notes

## 2022-06-03 NOTE — ASSESSMENT & PLAN NOTE
· Patient has history of left lower extremity DVT after which he was diagnosed with factor 5 Leiden mutation  This happened years ago    · Held Coumadin prior to biopsy and was on IV heparin until  May 31, 2022  ·  -will give5 mg dose today  · Daily INR -

## 2022-06-03 NOTE — ASSESSMENT & PLAN NOTE
Wt Readings from Last 3 Encounters:   06/03/22 103 kg (226 lb)   05/03/22 114 kg (251 lb)   04/29/22 113 kg (250 lb 1 6 oz)     · Prior echo 04/25/2022:  EF 41%, Diastolic function: grade 2 pseudonormal relaxation  · Monitor I/O and daily weights  · Does not take outpatient diuretics  IV Lasix with minimal results during prior admission    Patient had received albumin to help with anasarca  · Continue on  Fluid restriction, 2 g sodium- no signs of fluid overload

## 2022-06-03 NOTE — ASSESSMENT & PLAN NOTE
· S/p cervical fusion performed by Dr Andres Noyola on 2/18/35, complicated by MRSA infection  · Completed 24 days of IV vancomycin, transitioned to daptomycin on 04/24-4/29    Discharged home on doxycycline 100 mg b i d    · Now off doxycyline course as per id team   · Will need follow up with Neurosurgery as an outpatient-

## 2022-06-03 NOTE — PLAN OF CARE
Problem: INFECTION - ADULT  Goal: Absence or prevention of progression during hospitalization  Description: INTERVENTIONS:  - Assess and monitor for signs and symptoms of infection  - Monitor lab/diagnostic results  - Monitor all insertion sites, i e  indwelling lines, tubes, and drains  - Monitor endotracheal if appropriate and nasal secretions for changes in amount and color  - [place] appropriate cooling/warming therapies per order  - Administer medications as ordered  - Instruct and encourage patient and family to use good hand hygiene technique  - Identify and instruct in appropriate isolation precautions for identified infection/condition  Outcome: Progressing     Problem: DISCHARGE PLANNING  Goal: Discharge to home or other facility with appropriate resources  Description: INTERVENTIONS:  - Identify barriers to discharge w/patient and caregiver  - Arrange for needed discharge resources and transportation as appropriate  - Identify discharge learning needs (meds, wound care, etc )  - Arrange for interpretive services to assist at discharge as needed  - Refer to Case Management Department for coordinating discharge planning if the patient needs post-hospital services based on physician/advanced practitioner order or complex needs related to functional status, cognitive ability, or social support system  Outcome: Progressing     Problem: Knowledge Deficit  Goal: Patient/family/caregiver demonstrates understanding of disease process, treatment plan, medications, and discharge instructions  Description: Complete learning assessment and assess knowledge base    Interventions:  - Provide teaching at level of understanding  - Provide teaching via preferred learning methods  Outcome: Progressing     Problem: PAIN - ADULT  Goal: Verbalizes/displays adequate comfort level or baseline comfort level  Description: Interventions:  - Encourage patient to monitor pain and request assistance  - Assess pain using appropriate pain scale  - Administer analgesics based on type and severity of pain and evaluate response  - Implement non-pharmacological measures as appropriate and evaluate response  - Consider cultural and social influences on pain and pain management  - Notify physician/advanced practitioner if interventions unsuccessful or patient reports new pain  Outcome: Progressing

## 2022-06-03 NOTE — ASSESSMENT & PLAN NOTE
· Patient admitted with acute kidney injury felt to be most likely due to post infectious  glomerulonephritis    ·  Dr Salome Gibbs recommending cervical collar remain in place for at least additional 2 weeks  · Renal biopsy performed 5/26-results -being reviewed by Nephrology-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 postinfectious-await Dr Azra Olmstead opinion  · Peak creatinine of 5 6- then 4 61 on 5/31 - up to 5/34 today  ·  dialysis catherter was removed by ir yesterday  ·  now day 2 of prednisone - tolerating wll- if glucose and is stble will plan for discharge tomorrow

## 2022-06-03 NOTE — CASE MANAGEMENT
Case Management Discharge Planning Note    Patient name Maria Ines Connolly  Location /-92 MRN [id number]  : 1950 Date 6/3/2022       Current Admission Date: 5/10/2022  Current Admission Diagnosis:JANEL (acute kidney injury) [facility])   Patient Active Problem List    Diagnosis Date Noted    [facility] diastolic [facility]congestive) [facility] failure [facility] 05/10/2022    Microscopic hematuria 2022    Urinary frequency 2022    Glomerulonephritis     Other proteinuria     Ambulatory dysfunction 2022    JANEL (acute kidney injury) ([facility] ) 2022    Great toe pain, right 04/10/2022    Surgical site infection 2022    Status post cervical spinal fusion 2022    Anemia 2022    Head pain cephalgia 2022    Hyponatremia 2022    Muscle spasm 2022    Goals of care, counseling/discussion 2022    Sinus bradycardia 03/10/2022    Cervical myelopathy (HCC) 2022    Pes anserinus bursitis of right knee 2021    IFG (impaired fasting glucose) 2021    History of DVT of lower extremity 2021    Mixed hyperlipidemia 10/27/2020    Paroxysmal A-fib ([facility] ) 2020    Lower extremity edema 2020    Primary osteoarthritis of left knee 2020    Primary osteoarthritis of right knee 2020    Insomnia 03/10/2017    Lumbar herniated disc 03/10/2017    Erectile dysfunction 2016    Status post catheter ablation of atrial flutter 2015    Essential hypertension 2015    WILL (obstructive sleep apnea) 2015    Factor V Leiden mutation ([facility] ) 2014      LOS (days): 24  Geometric Mean LOS (GMLOS) (days): 3 10  Days to GMLOS:-20 7     OBJECTIVE:  Risk of Unplanned Readmission Score: 47 21         Current admission status: Inpatient   Preferred Pharmacy:   [facility]  ([address] [facility],   [address], [address]  [address] [place], [address] [address]  Phone: [phone] Fax: [phone]    [facility]/pharmacy #[id number]- Meri Jonas [facility] - [facility]  [address] [address]  Phone: [phone] Fax: [phone]    Primary Care Provider: Jeffrey Nguyen MD    Primary Insurance: [facility]  Secondary Insurance:     DISCHARGE DETAILS:    Discharge planning discussed with[de-identified] Patient     Were Treatment Team discharge recommendations reviewed with patient/caregiver?: Yes  Did patient/caregiver verbalize understanding of patient care needs?: Yes  Were patient/caregiver advised of the risks associated with not following Treatment Team discharge recommendations?: Yes    [facility]  Discharge Destination Plan[de-identified] Home with Guerlineerikayen at Discharge : Family         IMM Given (Date):: 06/03/22 (Copy provided to patient and media)  IMM Given to[de-identified] Patient     Additional Comments: Met with patient at bedside to discuss discharge planning  Discussed MADHAVI [facility] [place] [facility] services  Message to PCP office to make follow up appointment  HR red and readmission  Referral to OP CM

## 2022-06-04 VITALS
WEIGHT: 228.1 LBS | DIASTOLIC BLOOD PRESSURE: 73 MMHG | HEART RATE: 57 BPM | TEMPERATURE: 97.9 F | RESPIRATION RATE: 18 BRPM | BODY MASS INDEX: 31.93 KG/M2 | HEIGHT: 71 IN | OXYGEN SATURATION: 96 % | SYSTOLIC BLOOD PRESSURE: 145 MMHG

## 2022-06-04 LAB
ALBUMIN SERPL BCP-MCNC: 1.8 G/DL (ref 3.5–5)
ALP SERPL-CCNC: 61 U/L (ref 46–116)
ALT SERPL W P-5'-P-CCNC: 10 U/L (ref 12–78)
ANION GAP SERPL CALCULATED.3IONS-SCNC: 10 MMOL/L (ref 4–13)
AST SERPL W P-5'-P-CCNC: 10 U/L (ref 5–45)
BILIRUB SERPL-MCNC: 0.2 MG/DL (ref 0.2–1)
BUN SERPL-MCNC: 70 MG/DL (ref 5–25)
CALCIUM ALBUM COR SERPL-MCNC: 10.1 MG/DL (ref 8.3–10.1)
CALCIUM SERPL-MCNC: 8.3 MG/DL (ref 8.3–10.1)
CHLORIDE SERPL-SCNC: 103 MMOL/L (ref 100–108)
CO2 SERPL-SCNC: 22 MMOL/L (ref 21–32)
CREAT SERPL-MCNC: 4.94 MG/DL (ref 0.6–1.3)
ERYTHROCYTE [DISTWIDTH] IN BLOOD BY AUTOMATED COUNT: 14.1 % (ref 11.6–15.1)
GFR SERPL CREATININE-BSD FRML MDRD: 10 ML/MIN/1.73SQ M
GLUCOSE SERPL-MCNC: 113 MG/DL (ref 65–140)
GLUCOSE SERPL-MCNC: 117 MG/DL (ref 65–140)
GLUCOSE SERPL-MCNC: 132 MG/DL (ref 65–140)
GLUCOSE SERPL-MCNC: 132 MG/DL (ref 65–140)
HCT VFR BLD AUTO: 23 % (ref 36.5–49.3)
HGB BLD-MCNC: 7.5 G/DL (ref 12–17)
INR PPP: 2.65 (ref 0.84–1.19)
MCH RBC QN AUTO: 29.1 PG (ref 26.8–34.3)
MCHC RBC AUTO-ENTMCNC: 32.6 G/DL (ref 31.4–37.4)
MCV RBC AUTO: 89 FL (ref 82–98)
PLATELET # BLD AUTO: 175 THOUSANDS/UL (ref 149–390)
PMV BLD AUTO: 10 FL (ref 8.9–12.7)
POTASSIUM SERPL-SCNC: 4 MMOL/L (ref 3.5–5.3)
PROT SERPL-MCNC: 5.4 G/DL (ref 6.4–8.2)
PROTHROMBIN TIME: 27.6 SECONDS (ref 11.6–14.5)
RBC # BLD AUTO: 2.58 MILLION/UL (ref 3.88–5.62)
SODIUM SERPL-SCNC: 135 MMOL/L (ref 136–145)
WBC # BLD AUTO: 9.23 THOUSAND/UL (ref 4.31–10.16)

## 2022-06-04 PROCEDURE — 80053 COMPREHEN METABOLIC PANEL: CPT | Performed by: INTERNAL MEDICINE

## 2022-06-04 PROCEDURE — 82948 REAGENT STRIP/BLOOD GLUCOSE: CPT

## 2022-06-04 PROCEDURE — 99239 HOSP IP/OBS DSCHRG MGMT >30: CPT | Performed by: INTERNAL MEDICINE

## 2022-06-04 PROCEDURE — 85610 PROTHROMBIN TIME: CPT | Performed by: INTERNAL MEDICINE

## 2022-06-04 PROCEDURE — 85027 COMPLETE CBC AUTOMATED: CPT | Performed by: INTERNAL MEDICINE

## 2022-06-04 PROCEDURE — 99232 SBSQ HOSP IP/OBS MODERATE 35: CPT | Performed by: NURSE PRACTITIONER

## 2022-06-04 RX ORDER — LIDOCAINE 50 MG/G
1 PATCH TOPICAL DAILY PRN
Qty: 30 PATCH | Refills: 0 | Status: SHIPPED | OUTPATIENT
Start: 2022-06-04 | End: 2022-06-04 | Stop reason: SDUPTHER

## 2022-06-04 RX ORDER — PREDNISONE 20 MG/1
60 TABLET ORAL DAILY
Qty: 270 TABLET | Refills: 0 | Status: SHIPPED | OUTPATIENT
Start: 2022-06-04 | End: 2022-06-09

## 2022-06-04 RX ORDER — SEVELAMER HYDROCHLORIDE 800 MG/1
800 TABLET, FILM COATED ORAL
Qty: 90 TABLET | Refills: 0 | Status: SHIPPED | OUTPATIENT
Start: 2022-06-04 | End: 2022-06-04 | Stop reason: SDUPTHER

## 2022-06-04 RX ORDER — SULFAMETHOXAZOLE AND TRIMETHOPRIM 800; 160 MG/1; MG/1
1 TABLET ORAL 3 TIMES WEEKLY
Qty: 36 TABLET | Refills: 1 | Status: SHIPPED | OUTPATIENT
Start: 2022-06-06 | End: 2022-06-04 | Stop reason: SDUPTHER

## 2022-06-04 RX ORDER — DOXAZOSIN MESYLATE 1 MG/1
1 TABLET ORAL
Qty: 30 TABLET | Refills: 0 | Status: SHIPPED | OUTPATIENT
Start: 2022-06-04 | End: 2022-06-10 | Stop reason: SDUPTHER

## 2022-06-04 RX ORDER — SEVELAMER HYDROCHLORIDE 800 MG/1
800 TABLET, FILM COATED ORAL
Qty: 90 TABLET | Refills: 0 | Status: SHIPPED | OUTPATIENT
Start: 2022-06-04 | End: 2022-07-06

## 2022-06-04 RX ORDER — LIDOCAINE 50 MG/G
1 PATCH TOPICAL DAILY PRN
Qty: 30 PATCH | Refills: 0 | Status: SHIPPED | OUTPATIENT
Start: 2022-06-04 | End: 2022-06-08 | Stop reason: ALTCHOICE

## 2022-06-04 RX ORDER — PREDNISONE 20 MG/1
60 TABLET ORAL DAILY
Qty: 270 TABLET | Refills: 0 | Status: SHIPPED | OUTPATIENT
Start: 2022-06-04 | End: 2022-06-04 | Stop reason: SDUPTHER

## 2022-06-04 RX ORDER — SULFAMETHOXAZOLE AND TRIMETHOPRIM 800; 160 MG/1; MG/1
1 TABLET ORAL 3 TIMES WEEKLY
Qty: 36 TABLET | Refills: 0 | Status: SHIPPED | OUTPATIENT
Start: 2022-06-06 | End: 2022-06-21 | Stop reason: SDUPTHER

## 2022-06-04 RX ORDER — AMLODIPINE BESYLATE 10 MG/1
10 TABLET ORAL DAILY
Qty: 30 TABLET | Refills: 0 | Status: SHIPPED | OUTPATIENT
Start: 2022-06-04 | End: 2022-06-21 | Stop reason: SDUPTHER

## 2022-06-04 RX ORDER — B-COMPLEX WITH VITAMIN C
1 TABLET ORAL
Qty: 30 TABLET | Refills: 0 | Status: SHIPPED | OUTPATIENT
Start: 2022-06-04

## 2022-06-04 RX ORDER — B-COMPLEX WITH VITAMIN C
1 TABLET ORAL
Qty: 30 TABLET | Refills: 1 | Status: SHIPPED | OUTPATIENT
Start: 2022-06-04 | End: 2022-06-04 | Stop reason: SDUPTHER

## 2022-06-04 RX ORDER — AMLODIPINE BESYLATE 10 MG/1
10 TABLET ORAL DAILY
Qty: 30 TABLET | Refills: 0 | Status: SHIPPED | OUTPATIENT
Start: 2022-06-04 | End: 2022-06-04 | Stop reason: SDUPTHER

## 2022-06-04 RX ADMIN — GABAPENTIN 100 MG: 100 CAPSULE ORAL at 08:33

## 2022-06-04 RX ADMIN — SEVELAMER HYDROCHLORIDE 800 MG: 800 TABLET, FILM COATED PARENTERAL at 08:33

## 2022-06-04 RX ADMIN — Medication 1 TABLET: at 08:33

## 2022-06-04 RX ADMIN — DULOXETINE 60 MG: 30 CAPSULE, DELAYED RELEASE ORAL at 08:33

## 2022-06-04 RX ADMIN — AMLODIPINE BESYLATE 10 MG: 5 TABLET ORAL at 08:31

## 2022-06-04 RX ADMIN — PANTOPRAZOLE SODIUM 40 MG: 40 TABLET, DELAYED RELEASE ORAL at 06:25

## 2022-06-04 RX ADMIN — METOPROLOL SUCCINATE 100 MG: 50 TABLET, EXTENDED RELEASE ORAL at 08:30

## 2022-06-04 RX ADMIN — PREDNISONE 60 MG: 20 TABLET ORAL at 08:31

## 2022-06-04 NOTE — PLAN OF CARE
Problem: PAIN - ADULT  Goal: Verbalizes/displays adequate comfort level or baseline comfort level  Description: Interventions:  - Encourage patient to monitor pain and request assistance  - Assess pain using appropriate pain scale  - Administer analgesics based on type and severity of pain and evaluate response  - Implement non-pharmacological measures as appropriate and evaluate response  - Consider cultural and social influences on pain and pain management  - Notify physician/advanced practitioner if interventions unsuccessful or patient reports new pain  Outcome: Progressing     Problem: INFECTION - ADULT  Goal: Absence or prevention of progression during hospitalization  Description: INTERVENTIONS:  - Assess and monitor for signs and symptoms of infection  - Monitor lab/diagnostic results  - Monitor all insertion sites, i e  indwelling lines, tubes, and drains  - Monitor endotracheal if appropriate and nasal secretions for changes in amount and color  - [place] appropriate cooling/warming therapies per order  - Administer medications as ordered  - Instruct and encourage patient and family to use good hand hygiene technique  - Identify and instruct in appropriate isolation precautions for identified infection/condition  Outcome: Progressing     Problem: DISCHARGE PLANNING  Goal: Discharge to home or other facility with appropriate resources  Description: INTERVENTIONS:  - Identify barriers to discharge w/patient and caregiver  - Arrange for needed discharge resources and transportation as appropriate  - Identify discharge learning needs (meds, wound care, etc )  - Arrange for interpretive services to assist at discharge as needed  - Refer to Case Management Department for coordinating discharge planning if the patient needs post-hospital services based on physician/advanced practitioner order or complex needs related to functional status, cognitive ability, or social support system  Outcome: Progressing Problem: Knowledge Deficit  Goal: Patient/family/caregiver demonstrates understanding of disease process, treatment plan, medications, and discharge instructions  Description: Complete learning assessment and assess knowledge base  Interventions:  - Provide teaching at level of understanding  - Provide teaching via preferred learning methods  Outcome: Progressing     Problem: SAFETY ADULT  Goal: Maintain or return to baseline ADL function  Description: INTERVENTIONS:  -  Assess patient's ability to carry out ADLs; assess patient's baseline for ADL function and identify physical deficits which impact ability to perform ADLs (bathing, care of mouth/teeth, toileting, grooming, dressing, etc )  - Assess/evaluate cause of self-care deficits   - Assess range of motion  - Assess patient's mobility; develop plan if impaired  - Assess patient's need for assistive devices and provide as appropriate  - Encourage maximum independence but intervene and supervise when necessary  - Involve family in performance of ADLs  - Assess for home care needs following discharge   - Consider OT consult to assist with ADL evaluation and planning for discharge  - Provide patient education as appropriate  Outcome: Progressing  Goal: Maintains/Returns to pre admission functional level  Description: INTERVENTIONS:  - Perform BMAT or MOVE assessment daily    - Set and communicate daily mobility goal to care team and patient/family/caregiver  - Collaborate with rehabilitation services on mobility goals if consulted  - Perform Range of Motion 5 times a day  - Reposition patient every 3 hours    - Dangle patient 3 times a day  - Stand patient 3 times a day  - Ambulate patient 3 times a day  - Out of bed to chair 3 times a day   - Out of bed for meals 3 times a day  - Out of bed for toileting  - Record patient progress and toleration of activity level   Outcome: Progressing  Goal: Patient will remain free of falls  Description: INTERVENTIONS:  - Educate patient/family on patient safety including physical limitations  - Instruct patient to call for assistance with activity   - Consult OT/PT to assist with strengthening/mobility   - Keep Call bell within reach  - Keep bed low and locked with side rails adjusted as appropriate  - Keep care items and personal belongings within reach  - Initiate and maintain comfort rounds  - Make Fall Risk Sign visible to staff  - Offer Toileting every 2 Hours, in advance of need  - Initiate/Maintain bed alarm  - Obtain necessary fall risk management equipment: non slip socks  - Apply yellow socks and bracelet for high fall risk patients  - Consider moving patient to room near nurses station  Outcome: Progressing     Problem: MOBILITY - ADULT  Goal: Maintain or return to baseline ADL function  Description: INTERVENTIONS:  -  Assess patient's ability to carry out ADLs; assess patient's baseline for ADL function and identify physical deficits which impact ability to perform ADLs (bathing, care of mouth/teeth, toileting, grooming, dressing, etc )  - Assess/evaluate cause of self-care deficits   - Assess range of motion  - Assess patient's mobility; develop plan if impaired  - Assess patient's need for assistive devices and provide as appropriate  - Encourage maximum independence but intervene and supervise when necessary  - Involve family in performance of ADLs  - Assess for home care needs following discharge   - Consider OT consult to assist with ADL evaluation and planning for discharge  - Provide patient education as appropriate  Outcome: Progressing  Goal: Maintains/Returns to pre admission functional level  Description: INTERVENTIONS:  - Perform BMAT or MOVE assessment daily    - Set and communicate daily mobility goal to care team and patient/family/caregiver  - Collaborate with rehabilitation services on mobility goals if consulted  - Perform Range of Motion 5 times a day  - Reposition patient every 3 hours    - Dangle patient 3 times a day  - Stand patient 3 times a day  - Ambulate patient 3 times a day  - Out of bed to chair 3 times a day   - Out of bed for meals 3 times a day  - Out of bed for toileting  - Record patient progress and toleration of activity level   Outcome: Progressing     Problem: Prexisting or High Potential for Compromised Skin Integrity  Goal: Skin integrity is maintained or improved  Description: INTERVENTIONS:  - Identify patients at risk for skin breakdown  - Assess and monitor skin integrity  - Assess and monitor nutrition and hydration status  - Monitor labs   - Assess for incontinence   - Turn and reposition patient  - Assist with mobility/ambulation  - Relieve pressure over bony prominences  - Avoid friction and shearing  - Provide appropriate hygiene as needed including keeping skin clean and dry  - Evaluate need for skin moisturizer/barrier cream  - Collaborate with interdisciplinary team   - Patient/family teaching  - Consider wound care consult   Outcome: Progressing     Problem: Nutrition/Hydration-ADULT  Goal: Nutrient/Hydration intake appropriate for improving, restoring or maintaining nutritional needs  Description: Monitor and assess patient's nutrition/hydration status for malnutrition  Collaborate with interdisciplinary team and initiate plan and interventions as ordered  Monitor patient's weight and dietary intake as ordered or per policy  Utilize nutrition screening tool and intervene as necessary  Determine patient's food preferences and provide high-protein, high-caloric foods as appropriate       INTERVENTIONS:  - Monitor oral intake, urinary output, labs, and treatment plans  - Assess nutrition and hydration status and recommend course of action  - Evaluate amount of meals eaten  - Assist patient with eating if necessary   - Allow adequate time for meals  - Recommend/ encourage appropriate diets, oral nutritional supplements, and vitamin/mineral supplements  - Order, calculate, and assess calorie counts as needed  - Recommend, monitor, and adjust tube feedings and TPN/PPN based on assessed needs  - Assess need for intravenous fluids  - Provide specific nutrition/hydration education as appropriate  - Include patient/family/caregiver in decisions related to nutrition  Outcome: Progressing     Problem: METABOLIC, FLUID AND ELECTROLYTES - ADULT  Goal: Electrolytes maintained within normal limits  Description: INTERVENTIONS:  - Monitor labs and assess patient for signs and symptoms of electrolyte imbalances  - Administer electrolyte replacement as ordered  - Monitor response to electrolyte replacements, including repeat lab results as appropriate  - Instruct patient on fluid and nutrition as appropriate  Outcome: Progressing  Goal: Fluid balance maintained  Description: INTERVENTIONS:  - Monitor labs   - Monitor I/O and WT  - Instruct patient on fluid and nutrition as appropriate  - Assess for signs & symptoms of volume excess or deficit  Outcome: Progressing     Problem: SKIN/TISSUE INTEGRITY - ADULT  Goal: Skin Integrity remains intact(Skin Breakdown Prevention)  Description: Assess:  -Perform Matt assessment every shift  -Clean and moisturize skin every day  -Inspect skin when repositioning, toileting, and assisting with ADLS  -Assess under medical devices such as bracelets every shift  -Assess extremities for adequate circulation and sensation     Bed Management:  -Have minimal linens on bed & keep smooth, unwrinkled  -Change linens as needed when moist or perspiring  -Avoid sitting or lying in one position for more than 2 hours while in bed  -Keep HOB at 30 degrees     Toileting:  -Offer bedside commode  -Assess for incontinence every 2 hours  -Use incontinent care products after each incontinent episode such as barrier cream    Activity:  -Mobilize patient 3 times a day  -Encourage activity and walks on unit  -Encourage or provide ROM exercises   -Turn and reposition patient every 2 Hours  -Use appropriate equipment to lift or move patient in bed  -Instruct/ Assist with weight shifting every 3 hours when out of bed in chair  -Consider limitation of chair time 3 hour intervals    Skin Care:  -Avoid use of baby powder, tape, friction and shearing, hot water or constrictive clothing  -Relieve pressure over bony prominences using allevyn  -Do not massage red bony areas    Next Steps:  -Teach patient strategies to minimize risks such as shifting weight   -Consider consults to  interdisciplinary teams such as wound care  Outcome: Progressing  Goal: Incision(s), wounds(s) or drain site(s) healing without S/S of infection  Description: INTERVENTIONS  - Assess and document dressing, incision, wound bed, drain sites and surrounding tissue  - Provide patient and family education  - Perform skin care/dressing changes every day  Outcome: Progressing     Problem: HEMATOLOGIC - ADULT  Goal: Maintains hematologic stability  Description: INTERVENTIONS  - Assess for signs and symptoms of bleeding or hemorrhage  - Monitor labs  - Administer supportive blood products/factors as ordered and appropriate  Outcome: Progressing     Problem: MUSCULOSKELETAL - ADULT  Goal: Maintain or return mobility to safest level of function  Description: INTERVENTIONS:  - Assess patient's ability to carry out ADLs; assess patient's baseline for ADL function and identify physical deficits which impact ability to perform ADLs (bathing, care of mouth/teeth, toileting, grooming, dressing, etc )  - Assess/evaluate cause of self-care deficits   - Assess range of motion  - Assess patient's mobility  - Assess patient's need for assistive devices and provide as appropriate  - Encourage maximum independence but intervene and supervise when necessary  - Involve family in performance of ADLs  - Assess for home care needs following discharge   - Consider OT consult to assist with ADL evaluation and planning for discharge  - Provide patient education as appropriate  Outcome: Progressing

## 2022-06-04 NOTE — PROGRESS NOTES
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  -previous baseline creatinine less than 1 0   -presents with creatinine of 5 04, peak 5 5   -creatinine has remained stable around 5 0   -workup:   +urine eosinophils  UA with 3+ protein, glucose, large blood, innumerable RBCs, 10-20 WBCs, 2-4  hyaline cast    Serologies:  Hepatitis panel C3/C4, anti-double-stranded DNA, Anca, a ZAK, GBM  normal   SPEP/UPEP without monoclonal banding  Anti HUGH 2 are pending  Collected 05/19/2022  Renal biopsy 5/26 with IgA, suspected due to MRSA infection   -status post PermCath removal 6/2   -no indication for RRT this time   -has outpatient follow-up arranged with Dr Carly Pablo on June 8th, with  labs are ready in epic System  -recommend continue to avoid nephrotoxins, hypotension, IV contrast     IgA nephropathy:  Status post renal biopsy which showed IgA, fibrocellualrcrescent, 2/8 glomerular globally sclerosed, moderate scarring, + potocyte effacement raising concern for other superimposed condition  -currently on high-dose prednisone 60 mg daily, started 06/02/2022  Will be tapered as an outpatient  -on PPI for GI prophylaxis, calcium, and vitamin-D for mineral bone metabolism   -also on Bactrim DS 3 times a week for PJP prophylaxis  Nephrotic range proteinuria:  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   -most recent urine protein to creatinine ratio 18    -serologies as above   -will repeat urine protein to creatinine ratio as an outpatient  Microscopic hematuria: In the setting of post infectious GN  Currently resolved  Hypertension:  Blood pressure remains stable and acceptable    -currently on amlodipine 10 mg daily, doxazosin 1 mg at bedtime, Toprol- mg daily   -avoid hypotension or high fluctuations in blood pressure   -recommend holding parameters antihypertensive for systolic blood pressure less than 130 mmHg   -goal blood pressure less than 150/90  Chronic diastolic CHF:  With EF of 55% and grade 2 diastolic pseudonormal relaxation, normal IVC  Required intermittent Lasix dosing  During hospitalization  -currently monitoring off of diuretics  Hypercalcemia:  Most recent calcium 8 3 with corrected calcium at 10 1   -calcium acetate discontinued  Would avoid any calcium supplements   -continue to encourage oral hydration  Hyperphosphatemia:  -continue low phosphorus diet  -previously on phosphorus binders but discontinued due to hypercalcemia   -most recent phosphorus level 5 5 on 06/03/2022  Factor 5 deficiency    Anemia:  -continue to monitor and transfuse as needed for hemoglobin less than 7 0   -monoclonal workup negative, peripheral smear negative for schistocytes   -avoiding EARLE due to factor 5 deficiency with history of DVT  Recent cervical fusion 90/36/5079 complicated by MRSA infection, status post debridement with washout 04/01/2022  Completed antibiotic course  Other:  WILL, PAF, DVT on Coumadin    Disposition:  Okay to discharge from Renal with follow-up with Dr Loc Patrick already arranged  SUBJECTIVE:  The patient is resting in his bed  He is pleasant during our conversation  He denies chest discomfort or shortness of breath  He denies nausea, vomiting, diarrhea      OBJECTIVE:  Current Weight: Weight - Scale: 103 kg (228 lb 1 6 oz)  Vitals:    06/03/22 2204 06/03/22 2210 06/04/22 0500 06/04/22 0600   BP: 150/75 150/75     Pulse: 74 69     Resp: 20      Temp:  97 6 °F (36 4 °C)     TempSrc:       SpO2: 97% 97%     Weight:   103 kg (228 lb 1 6 oz) 103 kg (228 lb 1 6 oz)   Height:           Intake/Output Summary (Last 24 hours) at 6/4/2022 0823  Last data filed at 6/4/2022 0757  Gross per 24 hour   Intake 1544 ml   Output 2275 ml   Net -731 ml     General: NAD  Skin: warm, dry, intact, no rash  HEENT: Moist mucous membranes, sclera anicteric, normocephalic, atraumatic  Neck: No apparent JVD appreciated, cervical collar  Chest: lung sounds clear B/L, on RA   CVS:Regular rate and rhythm, no murmer   Abdomen: Soft, round, non-tender, +BS  Extremities:  Trace B/L LE edema present  Neuro: alert and oriented  Psych: appropriate mood and affect     Medications:    Current Facility-Administered Medications:     acetaminophen (TYLENOL) tablet 650 mg, 650 mg, Oral, Q6H PRN, Melchor Jenkins PA-C, 650 mg at 05/28/22 2133    amLODIPine (NORVASC) tablet 10 mg, 10 mg, Oral, Daily, Lizzeth Awad PA-C, 10 mg at 06/03/22 5278    calcium carbonate-vitamin D (OSCAL-D) 500 mg-200 units per tablet 1 tablet, 1 tablet, Oral, Daily With Breakfast, Anuj Sibley PA-C, 1 tablet at 06/03/22 0933    doxazosin (CARDURA) tablet 1 mg, 1 mg, Oral, HS, DEEPTHI Ferguson, 1 mg at 06/03/22 2204    DULoxetine (CYMBALTA) delayed release capsule 60 mg, 60 mg, Oral, Daily, Jonelle Lou PA-C, 60 mg at 06/03/22 0932    gabapentin (NEURONTIN) capsule 100 mg, 100 mg, Oral, Daily, Jonelle Lou PA-C, 100 mg at 06/03/22 0932    gabapentin (NEURONTIN) capsule 300 mg, 300 mg, Oral, HS, Jonelle Lou PA-C, 300 mg at 06/03/22 2205    hydrALAZINE (APRESOLINE) injection 10 mg, 10 mg, Intravenous, Q6H PRN, Knox Dakin, DO    lidocaine (LIDODERM) 5 % patch 1 patch, 1 patch, Topical, Daily PRN, Annette Flores PA-C, 1 patch at 05/17/22 0032    lidocaine (LIDODERM) 5 % patch 2 patch, 2 patch, Topical, Daily, Mak Farmer DO, 2 patch at 05/26/22 0947    methocarbamol (ROBAXIN) tablet 750 mg, 750 mg, Oral, Q6H PRN, Melchor Jenkins PA-C, 750 mg at 05/28/22 2133    metoprolol succinate (TOPROL-XL) 24 hr tablet 100 mg, 100 mg, Oral, Daily, Jonelle Lou PA-C, 100 mg at 06/03/22 0932    ondansetron (ZOFRAN) injection 4 mg, 4 mg, Intravenous, Q4H PRN, Annette Flores PA-C, 4 mg at 05/27/22 1045    pantoprazole (PROTONIX) EC tablet 40 mg, 40 mg, Oral, BID AC, Jonelle Lou PA-C, 40 mg at 06/04/22 2938    pravastatin (PRAVACHOL) tablet 40 mg, 40 mg, Oral, Daily With Dinner, Thee Gray PA-C, 40 mg at 06/03/22 1630    predniSONE tablet 60 mg, 60 mg, Oral, Daily, Shavonne Silverio PA-C, 60 mg at 06/03/22 0932    sevelamer (RENAGEL) tablet 800 mg, 800 mg, Oral, TID With Meals, Ami Schaffer MD, 800 mg at 06/03/22 1630    sulfamethoxazole-trimethoprim (BACTRIM DS) 800-160 mg per tablet 1 tablet, 1 tablet, Oral, Once per day on Mon Wed Fri, Shavonne Silverio PA-C, 1 tablet at 06/03/22 1342    tamsulosin (FLOMAX) capsule 0 4 mg, 0 4 mg, Oral, Daily With Juan Jerome PA-C, 0 4 mg at 06/03/22 1630    Laboratory Results:  Results from last 7 days   Lab Units 06/04/22  0622 06/03/22  0604 06/02/22  0545 06/01/22  1042 06/01/22  0557 05/31/22  0438 05/30/22  0513   WBC Thousand/uL 9 23  --   --  6 03  --   --  5 54   HEMOGLOBIN g/dL 7 5*  --   --  8 9*  --   --  7 5*   HEMATOCRIT % 23 0*  --   --  28 5*  --   --  23 2*   PLATELETS Thousands/uL 175  --   --  194  --   --  151   SODIUM mmol/L 135* 135* 136  --  135* 136 135*   POTASSIUM mmol/L 4 0 4 1 3 7  --  3 9 3 7 3 3*   CHLORIDE mmol/L 103 103 104  --  102 103 102   CO2 mmol/L 22 22 24  --  24 23 24   BUN mg/dL 70* 66* 57*  --  53* 52* 57*   CREATININE mg/dL 4 94* 5 34* 4 92*  --  4 69* 4 61* 4 78*   CALCIUM mg/dL 8 3 8 8 8 3  --  8 5 8 2* 8 2*   MAGNESIUM mg/dL  --   --   --   --  2 2 1 3*  --    PHOSPHORUS mg/dL  --  5 5*  --   --   --  4 5*  --    ALK PHOS U/L 61 81  --   --   --   --  77   ALT U/L 10* 9*  --   --   --   --  6*   AST U/L 10 13  --   --   --   --  17

## 2022-06-04 NOTE — PLAN OF CARE
Problem: PAIN - ADULT  Goal: Verbalizes/displays adequate comfort level or baseline comfort level  Description: Interventions:  - Encourage patient to monitor pain and request assistance  - Assess pain using appropriate pain scale  - Administer analgesics based on type and severity of pain and evaluate response  - Implement non-pharmacological measures as appropriate and evaluate response  - Consider cultural and social influences on pain and pain management  - Notify physician/advanced practitioner if interventions unsuccessful or patient reports new pain  Outcome: Progressing     Problem: INFECTION - ADULT  Goal: Absence or prevention of progression during hospitalization  Description: INTERVENTIONS:  - Assess and monitor for signs and symptoms of infection  - Monitor lab/diagnostic results  - Monitor all insertion sites, i e  indwelling lines, tubes, and drains  - Monitor endotracheal if appropriate and nasal secretions for changes in amount and color  - Whitakers appropriate cooling/warming therapies per order  - Administer medications as ordered  - Instruct and encourage patient and family to use good hand hygiene technique  - Identify and instruct in appropriate isolation precautions for identified infection/condition  Outcome: Progressing     Problem: DISCHARGE PLANNING  Goal: Discharge to home or other facility with appropriate resources  Description: INTERVENTIONS:  - Identify barriers to discharge w/patient and caregiver  - Arrange for needed discharge resources and transportation as appropriate  - Identify discharge learning needs (meds, wound care, etc )  - Arrange for interpretive services to assist at discharge as needed  - Refer to Case Management Department for coordinating discharge planning if the patient needs post-hospital services based on physician/advanced practitioner order or complex needs related to functional status, cognitive ability, or social support system  Outcome: Progressing Problem: Knowledge Deficit  Goal: Patient/family/caregiver demonstrates understanding of disease process, treatment plan, medications, and discharge instructions  Description: Complete learning assessment and assess knowledge base  Interventions:  - Provide teaching at level of understanding  - Provide teaching via preferred learning methods  Outcome: Progressing     Problem: Prexisting or High Potential for Compromised Skin Integrity  Goal: Skin integrity is maintained or improved  Description: INTERVENTIONS:  - Identify patients at risk for skin breakdown  - Assess and monitor skin integrity  - Assess and monitor nutrition and hydration status  - Monitor labs   - Assess for incontinence   - Turn and reposition patient  - Assist with mobility/ambulation  - Relieve pressure over bony prominences  - Avoid friction and shearing  - Provide appropriate hygiene as needed including keeping skin clean and dry  - Evaluate need for skin moisturizer/barrier cream  - Collaborate with interdisciplinary team   - Patient/family teaching  - Consider wound care consult   Outcome: Progressing     Problem: Nutrition/Hydration-ADULT  Goal: Nutrient/Hydration intake appropriate for improving, restoring or maintaining nutritional needs  Description: Monitor and assess patient's nutrition/hydration status for malnutrition  Collaborate with interdisciplinary team and initiate plan and interventions as ordered  Monitor patient's weight and dietary intake as ordered or per policy  Utilize nutrition screening tool and intervene as necessary  Determine patient's food preferences and provide high-protein, high-caloric foods as appropriate       INTERVENTIONS:  - Monitor oral intake, urinary output, labs, and treatment plans  - Assess nutrition and hydration status and recommend course of action  - Evaluate amount of meals eaten  - Assist patient with eating if necessary   - Allow adequate time for meals  - Recommend/ encourage appropriate diets, oral nutritional supplements, and vitamin/mineral supplements  - Order, calculate, and assess calorie counts as needed  - Recommend, monitor, and adjust tube feedings and TPN/PPN based on assessed needs  - Assess need for intravenous fluids  - Provide specific nutrition/hydration education as appropriate  - Include patient/family/caregiver in decisions related to nutrition  Outcome: Progressing     Problem: METABOLIC, FLUID AND ELECTROLYTES - ADULT  Goal: Electrolytes maintained within normal limits  Description: INTERVENTIONS:  - Monitor labs and assess patient for signs and symptoms of electrolyte imbalances  - Administer electrolyte replacement as ordered  - Monitor response to electrolyte replacements, including repeat lab results as appropriate  - Instruct patient on fluid and nutrition as appropriate  Outcome: Progressing  Goal: Fluid balance maintained  Description: INTERVENTIONS:  - Monitor labs   - Monitor I/O and WT  - Instruct patient on fluid and nutrition as appropriate  - Assess for signs & symptoms of volume excess or deficit  Outcome: Progressing     Problem: SKIN/TISSUE INTEGRITY - ADULT  Goal: Skin Integrity remains intact(Skin Breakdown Prevention)  Description: Assess:  -Perform Matt assessment every shift  -Clean and moisturize skin every day  -Inspect skin when repositioning, toileting, and assisting with ADLS  -Assess under medical devices such as bracelets every shift  -Assess extremities for adequate circulation and sensation     Bed Management:  -Have minimal linens on bed & keep smooth, unwrinkled  -Change linens as needed when moist or perspiring  -Avoid sitting or lying in one position for more than 2 hours while in bed  -Keep HOB at 30 degrees     Toileting:  -Offer bedside commode  -Assess for incontinence every 2 hours  -Use incontinent care products after each incontinent episode such as barrier cream    Activity:  -Mobilize patient 3 times a day  -Encourage activity and walks on unit  -Encourage or provide ROM exercises   -Turn and reposition patient every 2 Hours  -Use appropriate equipment to lift or move patient in bed  -Instruct/ Assist with weight shifting every 3 hours when out of bed in chair  -Consider limitation of chair time 3 hour intervals    Skin Care:  -Avoid use of baby powder, tape, friction and shearing, hot water or constrictive clothing  -Relieve pressure over bony prominences using allevyn  -Do not massage red bony areas    Next Steps:  -Teach patient strategies to minimize risks such as shifting weight   -Consider consults to  interdisciplinary teams such as wound care  Outcome: Progressing  Goal: Incision(s), wounds(s) or drain site(s) healing without S/S of infection  Description: INTERVENTIONS  - Assess and document dressing, incision, wound bed, drain sites and surrounding tissue  - Provide patient and family education  - Perform skin care/dressing changes every day  Outcome: Progressing     Problem: HEMATOLOGIC - ADULT  Goal: Maintains hematologic stability  Description: INTERVENTIONS  - Assess for signs and symptoms of bleeding or hemorrhage  - Monitor labs  - Administer supportive blood products/factors as ordered and appropriate  Outcome: Progressing     Problem: MUSCULOSKELETAL - ADULT  Goal: Maintain or return mobility to safest level of function  Description: INTERVENTIONS:  - Assess patient's ability to carry out ADLs; assess patient's baseline for ADL function and identify physical deficits which impact ability to perform ADLs (bathing, care of mouth/teeth, toileting, grooming, dressing, etc )  - Assess/evaluate cause of self-care deficits   - Assess range of motion  - Assess patient's mobility  - Assess patient's need for assistive devices and provide as appropriate  - Encourage maximum independence but intervene and supervise when necessary  - Involve family in performance of ADLs  - Assess for home care needs following discharge   - Consider OT consult to assist with ADL evaluation and planning for discharge  - Provide patient education as appropriate  Outcome: Progressing     Problem: MOBILITY - ADULT  Goal: Maintain or return to baseline ADL function  Description: INTERVENTIONS:  -  Assess patient's ability to carry out ADLs; assess patient's baseline for ADL function and identify physical deficits which impact ability to perform ADLs (bathing, care of mouth/teeth, toileting, grooming, dressing, etc )  - Assess/evaluate cause of self-care deficits   - Assess range of motion  - Assess patient's mobility; develop plan if impaired  - Assess patient's need for assistive devices and provide as appropriate  - Encourage maximum independence but intervene and supervise when necessary  - Involve family in performance of ADLs  - Assess for home care needs following discharge   - Consider OT consult to assist with ADL evaluation and planning for discharge  - Provide patient education as appropriate  Outcome: Progressing  Goal: Maintains/Returns to pre admission functional level  Description: INTERVENTIONS:  - Perform BMAT or MOVE assessment daily    - Set and communicate daily mobility goal to care team and patient/family/caregiver  - Collaborate with rehabilitation services on mobility goals if consulted  - Perform Range of Motion 5 times a day  - Reposition patient every 3 hours    - Dangle patient 3 times a day  - Stand patient 3 times a day  - Ambulate patient 3 times a day  - Out of bed to chair 3 times a day   - Out of bed for meals 3 times a day  - Out of bed for toileting  - Record patient progress and toleration of activity level   Outcome: Progressing     Problem: SAFETY ADULT  Goal: Maintain or return to baseline ADL function  Description: INTERVENTIONS:  -  Assess patient's ability to carry out ADLs; assess patient's baseline for ADL function and identify physical deficits which impact ability to perform ADLs (bathing, care of mouth/teeth, toileting, grooming, dressing, etc )  - Assess/evaluate cause of self-care deficits   - Assess range of motion  - Assess patient's mobility; develop plan if impaired  - Assess patient's need for assistive devices and provide as appropriate  - Encourage maximum independence but intervene and supervise when necessary  - Involve family in performance of ADLs  - Assess for home care needs following discharge   - Consider OT consult to assist with ADL evaluation and planning for discharge  - Provide patient education as appropriate  Outcome: Progressing  Goal: Maintains/Returns to pre admission functional level  Description: INTERVENTIONS:  - Perform BMAT or MOVE assessment daily    - Set and communicate daily mobility goal to care team and patient/family/caregiver  - Collaborate with rehabilitation services on mobility goals if consulted  - Perform Range of Motion 5 times a day  - Reposition patient every 3 hours    - Dangle patient 3 times a day  - Stand patient 3 times a day  - Ambulate patient 3 times a day  - Out of bed to chair 3 times a day   - Out of bed for meals 3 times a day  - Out of bed for toileting  - Record patient progress and toleration of activity level   Outcome: Progressing  Goal: Patient will remain free of falls  Description: INTERVENTIONS:  - Educate patient/family on patient safety including physical limitations  - Instruct patient to call for assistance with activity   - Consult OT/PT to assist with strengthening/mobility   - Keep Call bell within reach  - Keep bed low and locked with side rails adjusted as appropriate  - Keep care items and personal belongings within reach  - Initiate and maintain comfort rounds  - Make Fall Risk Sign visible to staff  - Offer Toileting every 2 Hours, in advance of need  - Initiate/Maintain bed alarm  - Obtain necessary fall risk management equipment: non slip socks  - Apply yellow socks and bracelet for high fall risk patients  - Consider moving patient to room near nurses station  Outcome: Progressing

## 2022-06-04 NOTE — DISCHARGE SUMMARY
Discharge Summary - Jef Mitchell 71 y o  male MRN: [id number]    Unit/Bed#: -01 Encounter: [id number]    Admission Date: 5/10/2022     Admitting Diagnosis: JANEL (acute kidney injury) [facility])    HPI:  76-year-old male admitted on May 10, 2022 with rising creatinine level to 5 04 as an outpatient with associated pitting lower extremity edema  Prior creatinine had been 3 85 on discharge from hospital April 29, 2022  Consults  Nephrology-Dr Gladys Prado, Cardiology-Dr Sukumar Houston, ID-Dr Bertha Nicholson, urology-Dr Xuan Ivory, interventional Radiology Dr Haydee Ramirez  Procedures Performed: No orders of the defined types were placed in this encounter  Placement of PermCath  Hospital Course:  Patient was admitted for evaluation after developing his gross hematuria and worsening acute kidney injury as an outpatient  He has had a complex course after undergoing cervical surgery with number no surgery team and [facility] followed by surgical site   infection with MRSA  He had a lengthy stay in [facility] followed by acute rehab and had been discharged home but now developed increased creatinine levels and hematuria  Urology did evaluate patient as well as Nephrology  He was felt to have a glomerular nephritis possibly triggered by the recent staph infection  Biopsy did show findings consistent with IgA nephropathy  He is being started on prednisone 60 mg daily  He did not have any CNS effects with the dosing of that  He did have mild elevation in glucose readings varying between 115 and 143  He will be sent home with glucometer to monitor this at home and follow-up with his primary physician  Patient had a PermCath placed but had gradual stabilization in his creatinine was not felt to need acute dialysis  This is was removed prior to his discharge home    On the date of discharge creatinine was 4 9 4 with a BUN of 70  He has been continuing to wear a firm cervical collar and will be scheduled to follow-up with neuro surgery team and a decision be made on timing of discontinuation of the support collar based on their evaluation  Patient also had some right lower lumbar tenderness with some radiation laterally  Id he has been on a Lidoderm patch with some improvement in symptoms  We did discuss gentle stretching exercises and once cleared by neuro surgery will likely resume physical therapy  Significant Findings, Care, Treatment and Services Provided:  CT ABDOMEN AND PELVIS WITHOUT IV CONTRAST     INDICATION:   had renal biopsy on 5/26, on heparin gtt due to coumadin intake held prior to procedure, Hgb drop to 6 4 this AM from 7 1 yesterday pre-biopsy, concern for possible hematoma, unable to use IV contrast due to kidney function      COMPARISON:  5/26/2022 and 4/16/2008      TECHNIQUE:  CT examination of the abdomen and pelvis was performed without intravenous contrast  This examination was performed without intravenous contrast in the context of the critical nationwide Omnipaque shortage  Axial, sagittal, and coronal 2D   reformatted images were created from the source data and submitted for interpretation       Radiation dose length product (DLP) for this visit:  548 88 mGy-cm   This examination, like all CT scans performed in the [facility], was performed utilizing techniques to minimize radiation dose exposure, including the use of iterative   reconstruction and automated exposure control       Enteric contrast was not administered       FINDINGS:     ABDOMEN     LOWER CHEST:  Low-attenuation overlying the cardiac chambers suggestive of anemic state  Tiny hiatal hernia      LIVER/BILIARY TREE:  Unremarkable      GALLBLADDER:  No calcified gallstones  No pericholecystic inflammatory change      SPLEEN:  Unremarkable      PANCREAS:  Unremarkable      ADRENAL GLANDS:  Unremarkable      KIDNEYS/URETERS:  Few foci of gas noted along the posterior right renal cortex consistent with very recent postbiopsy changes   No hydronephrosis      STOMACH AND BOWEL:  Redemonstrated postsurgical changes at the right colon  Colonic diverticulosis      APPENDIX:  No findings to suggest appendicitis      ABDOMINOPELVIC CAVITY:  No ascites  No pneumoperitoneum  No lymphadenopathy      VESSELS:  Unremarkable for patient's age      PELVIS     REPRODUCTIVE ORGANS:  Unremarkable for patient's age      URINARY BLADDER:  Unremarkable      ABDOMINAL WALL/INGUINAL REGIONS:  Unremarkable      OSSEOUS STRUCTURES:  No acute fracture or destructive osseous lesion  Spinal degenerative changes are noted      IMPRESSION:     No evidence of retroperitoneal/abdominopelvic hematoma      Right renal postbiopsy changes      Colonic diverticulosis      Workstation performed: [id number]     General appearance: alert and cyanotic  Neck: no adenopathy, no JVD and thyroid not enlarged, symmetric, no tenderness/mass/nodules firm collar in palce  Lungs: clear to auscultation bilaterally  Heart: regular rate and rhythm, S1, S2 normal, no murmur, click, rub or gallop  Abdomen: soft, non-tender; bowel sounds normal; no masses,  no organomegaly  Extremities: extremities normal, warm and well-perfused; no cyanosis, clubbing, or edema  Skin: no rashes noted  Neurologic: no focal motor changes       Complications: none    Discharge Diagnosis: Principal Problem:    JANEL (acute kidney injury) ([facility] )  Active Problems:    Factor V Leiden mutation ([facility] )    Glomerulonephritis    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    Essential hypertension    Paroxysmal A-fib (HCC)    Mixed hyperlipidemia    Anemia    Chronic diastolic (congestive) heart failure (HCC)    Hypercalcemia    Hyperphosphatemia    WILL (obstructive sleep apnea)    Surgical site infection        Condition at Discharge: good     Discharge instructions/Information to patient and family:   See after visit summary for information provided to patient and family        Provisions for Follow-Up Care:  See after visit summary for information related to follow-up care and any pertinent home health orders  Disposition: Home    Planned Readmission: No    Discharge Statement   I spent 42 minutes discharging the patient  This time was spent on the day of discharge  I had direct contact with the patient on the day of discharge  coordination of care with case management and nephrology team Bedside  discussion with patient and wife regarding plan for home glucose monitoring as well as outpatient labs weekly with protime monitoring given new high-dose steroids on discharge  Discharge Medications:  See after visit summary for reconciled discharge medications provided to patient and family          Ok Guaman DO

## 2022-06-04 NOTE — NURSING NOTE
Nurse provided extensive education on new/changed medications, Heart Healthy Diet, and how to protect kidneys using diet and avoiding medications  Both pt and pt's wife educated on an demonstrated competence and understanding on how to utilize glucometer to test pt's blood sugar at home  Pt and wife provided multiple reference pamphlets on JANEL, Heart Healthy Diet, and glucometers to take home  Pt and  had no additional questions

## 2022-06-06 ENCOUNTER — PATIENT OUTREACH (OUTPATIENT)
Dept: FAMILY MEDICINE CLINIC | Facility: CLINIC | Age: 72
End: 2022-06-06

## 2022-06-06 ENCOUNTER — TRANSITIONAL CARE MANAGEMENT (OUTPATIENT)
Dept: FAMILY MEDICINE CLINIC | Facility: CLINIC | Age: 72
End: 2022-06-06

## 2022-06-06 ENCOUNTER — HOSPITAL ENCOUNTER (OUTPATIENT)
Dept: RADIOLOGY | Facility: HOSPITAL | Age: 72
Discharge: HOME | End: 2022-06-06
Payer: COMMERCIAL

## 2022-06-06 DIAGNOSIS — Z98.1 STATUS POST CERVICAL SPINAL FUSION: ICD-10-CM

## 2022-06-06 LAB
CREAT ?TM UR-SCNC: 358.1 UMOL/L
EXT PROTEIN URINE: 84.2
PROT/CREAT UR: 4253 MG/G{CREAT}

## 2022-06-06 PROCEDURE — 72040 X-RAY EXAM NECK SPINE 2-3 VW: CPT

## 2022-06-06 NOTE — PROGRESS NOTES
Outpatient Care Management Note:    Care manager called Titus Hernandez following up after his recent hospitalization  I spoke with his wife, Ebony Cartwright  She states that they are headed out to get blood work and Tian's cervical spine xray  She denies any redness, swelling or drainage from the incision site  Titus Hernandez is still wearing his collar  He has not been seen by [facility] yet post discharge, but he was contacted  He is to have nursing, physical and occupation therapy  Ebony Cartwright states that it is easier and better for them to get lab work completed at [facility], so they do not get charged extra costs  Titus Hernandez is to monitor his blood sugars due to steroid use  He has a glucometer but needs instruction on use  Ebony Cartwright will have [facility] nurse teach them to use the glucometer  CM instructed her to record blood sugars and monitor them for trends  She should call Dr Brittany Harrison if they are trending higher  CM reviewed that Titus Hernandez needs to schedule follow up appts with cardiology and Dr Brittany Harrison  Ebony Cartwright states that Titus Hernandez no longer has visible blood in his urine  He is urinating without difficulty  She did note that he has been having pain in his rib cage area radiating to his back  She states that they evaluated it in the hospital and he had xrays completed  He was told it is likely a pulled muscle  Ebony Cartwright will continue to monitor the pain symptoms  She will call Dr Brittany Harrison with any worsening pain or shortness of breath symptoms  Ebonyeddie Cartwright has my contact information  She knows to call the PCP office directly for any immediate concerns  Ebonyreynold Cartwright did not have time to complete med review this morning  She will call CM back tomorrow to complete med review if [facility] nurse does not complete it prior

## 2022-06-06 NOTE — PROGRESS NOTES
Jovanni Flanagan communicated her pt just came home from the hospital yesterday and they have been busy going to appointments everyday this week and she will mail pt BCT application with a copy of his license  Jovanni Flanagan indicated she will return call to CM OC when it is mailed      Will F/U by 6/10

## 2022-06-06 NOTE — UTILIZATION REVIEW
Notification of Discharge   This is a Notification of Discharge from our facility [address]  Please be advised that this patient has been discharge from our facility  Below you will find the admission and discharge date and time including the patients disposition  UTILIZATION REVIEW CONTACT:  Jordan Geller  Utilization   Network Utilization Review Department  Phone: [phone] carefully listen to the prompts  All voicemails are confidential   Email: [email or web address]     PHYSICIAN ADVISORY SERVICES:  FOR VCJM-TT-NAVA REVIEW - MEDICAL NECESSITY DENIAL  Phone: [phone]  Fax: [phone]  Email: [email or web address]     PRESENTATION DATE: 5/10/2022  6:51 PM  OBERVATION ADMISSION DATE:   INPATIENT ADMISSION DATE: 5/10/22  6:51 PM   DISCHARGE DATE: 6/4/2022  1:20 PM  [facility] [address] INFORMATION:  Send all requests for admission clinical reviews, approved or denied determinations and any other requests to dedicated fax number below belonging to the campus where the patient is receiving treatment   List of dedicated fax numbers:  [address] DENIALS (Administrative/Medical Necessity) [phone]   [address] (Maternity/NICU/Pediatrics) [phone]   [facility] [phone]   [address] [facility] [phone]   [address] [place] [address] [phone]   2000 [facility] [address] [place] [address],[address] [phone]   [facility]  [phone] [address] [facility] [address] [facility]  [address] [facility] 1000 [address] [facility] [phone]

## 2022-06-07 ENCOUNTER — TELEPHONE (OUTPATIENT)
Dept: NEPHROLOGY | Facility: CLINIC | Age: 72
End: 2022-06-07

## 2022-06-07 ENCOUNTER — TELEMEDICINE (OUTPATIENT)
Dept: NEUROSURGERY | Facility: CLINIC | Age: 72
End: 2022-06-07

## 2022-06-07 VITALS — WEIGHT: 230 LBS | BODY MASS INDEX: 32.08 KG/M2

## 2022-06-07 DIAGNOSIS — Z48.89 ENCOUNTER FOR POSTOPERATIVE CARE RELATED TO SURGICAL JOINT FUSION: Primary | ICD-10-CM

## 2022-06-07 DIAGNOSIS — Z98.1 ENCOUNTER FOR POSTOPERATIVE CARE RELATED TO SURGICAL JOINT FUSION: Primary | ICD-10-CM

## 2022-06-07 LAB
ALBUMIN SERPL-MCNC: 3.3 G/DL (ref 3.7–4.7)
ALBUMIN/GLOB SERPL: 1.4 {RATIO} (ref 1.2–2.2)
ALP SERPL-CCNC: 73 IU/L (ref 44–121)
ALT SERPL-CCNC: 20 IU/L (ref 0–44)
AST SERPL-CCNC: 20 IU/L (ref 0–40)
BASOPHILS # BLD AUTO: 0 X10E3/UL (ref 0–0.2)
BASOPHILS NFR BLD AUTO: 0 %
BILIRUB SERPL-MCNC: <0.2 MG/DL (ref 0–1.2)
BUN SERPL-MCNC: 85 MG/DL (ref 8–27)
BUN/CREAT SERPL: 18 (ref 10–24)
CALCIUM SERPL-MCNC: 8.2 MG/DL (ref 8.6–10.2)
CHLORIDE SERPL-SCNC: 101 MMOL/L (ref 96–106)
CO2 SERPL-SCNC: 18 MMOL/L (ref 20–29)
CREAT ?TM UR-SCNC: 84.2 UMOL/L
CREAT SERPL-MCNC: 4.71 MG/DL (ref 0.76–1.27)
EGFR: 13 ML/MIN/1.73
EOSINOPHIL # BLD AUTO: 0 X10E3/UL (ref 0–0.4)
EOSINOPHIL NFR BLD AUTO: 0 %
ERYTHROCYTE [DISTWIDTH] IN BLOOD BY AUTOMATED COUNT: 13.5 % (ref 11.6–15.4)
EXT BILIRUBIN, UA: NORMAL
EXT BLOOD, UA: NORMAL
EXT COLOR, UA: YELLOW
EXT GLUCOSE, UA: NORMAL
EXT KETONES: NORMAL
EXT NITRITE, UA: NORMAL
EXT PH, UA: 5
EXT PROTEIN URINE: 358.1
EXT PROTEIN, UA: NORMAL
EXT SPECIFIC GRAVITY, UA: 1.01
EXT UROBILINOGEN: 0.2
EXTERNAL BACTERIA (UA): NORMAL
EXTERNAL CASTS (UA): NORMAL
EXTERNAL RBC (UA): >30
EXTERNAL WBC (UA): NORMAL
GLOBULIN SER-MCNC: 2.4 G/DL (ref 1.5–4.5)
GLUCOSE SERPL-MCNC: 123 MG/DL (ref 65–99)
HCT VFR BLD AUTO: 26.8 % (ref 37.5–51)
HGB BLD-MCNC: 8.5 G/DL (ref 13–17.7)
IMM GRANULOCYTES # BLD: 0.1 X10E3/UL (ref 0–0.1)
IMM GRANULOCYTES NFR BLD: 1 %
INR PPP: 2.4 (ref 0.9–1.2)
INR PPP: 2.4 (ref 0.9–1.2)
LYMPHOCYTES # BLD AUTO: 0.9 X10E3/UL (ref 0.7–3.1)
LYMPHOCYTES NFR BLD AUTO: 10 %
MCH RBC QN AUTO: 28.8 PG (ref 26.6–33)
MCHC RBC AUTO-ENTMCNC: 31.7 G/DL (ref 31.5–35.7)
MCV RBC AUTO: 91 FL (ref 79–97)
MONOCYTES # BLD AUTO: 0.6 X10E3/UL (ref 0.1–0.9)
MONOCYTES NFR BLD AUTO: 7 %
NEUTROPHILS # BLD AUTO: 7.6 X10E3/UL (ref 1.4–7)
NEUTROPHILS NFR BLD AUTO: 82 %
PLATELET # BLD AUTO: 252 X10E3/UL (ref 150–450)
POTASSIUM SERPL-SCNC: 4.2 MMOL/L (ref 3.5–5.2)
PROT SERPL-MCNC: 5.7 G/DL (ref 6–8.5)
PROT/CREAT UR: 4.25 MG/G{CREAT}
PROTHROMBIN TIME: 24 SEC (ref 9.1–12)
PROTHROMBIN TIME: 24.1 SEC (ref 9.1–12)
RBC # BLD AUTO: 2.95 X10E6/UL (ref 4.14–5.8)
SODIUM SERPL-SCNC: 135 MMOL/L (ref 134–144)
WBC # BLD AUTO: 9.2 X10E3/UL (ref 3.4–10.8)
WBC # BLD EST: NORMAL 10*3/UL

## 2022-06-07 PROCEDURE — 99024 POSTOP FOLLOW-UP VISIT: CPT | Performed by: NEUROLOGICAL SURGERY

## 2022-06-07 RX ORDER — BLOOD SUGAR DIAGNOSTIC
STRIP MISCELLANEOUS
COMMUNITY
Start: 2022-06-05 | End: 2022-07-04

## 2022-06-07 RX ORDER — BLOOD-GLUCOSE METER
EACH MISCELLANEOUS
COMMUNITY
Start: 2022-06-05

## 2022-06-07 NOTE — RESULT ENCOUNTER NOTE
Call patient with lab result-same  I believe he is out of the hospital   Should probably check in 1 week

## 2022-06-07 NOTE — PROGRESS NOTES
Virtual Regular Visit    Verification of patient location:    Patient is located in the following state in which I hold an active license [place]      Assessment/Plan:    Problem List Items Addressed This Visit    None              Reason for visit is   Chief Complaint   Patient presents with    Virtual Regular Visit     2 Weeks F/u for Status post cervical spinal fusion, S/p (DKO) Posterior cervical evacuation of postoperative collection and debridement with placement of drains C3-T1 [4/1/2022]; XR C-Spine 6/6/22 SL        Encounter provider Parish Pfeiffer MD    Provider located at [address] [address] [phone]      Recent Visits  No visits were found meeting these conditions  Showing recent visits within past 7 days and meeting all other requirements  Today's Visits  Date Type Provider Dept   06/07/22 Telemedicine aPrish Pfeiffer MD [address] [place] [address] today's visits and meeting all other requirements  Future Appointments  No visits were found meeting these conditions  Showing future appointments within next 150 days and meeting all other requirements       The patient was identified by name and date of birth  Frandy Hickey was informed that this is a telemedicine visit and that the visit is being conducted through [address] [place] [address] Now and patient was informed that this is a secure, HIPAA-compliant platform  He agrees to proceed     My office door was closed  No one else was in the room  He acknowledged consent and understanding of privacy and security of the video platform  The patient has agreed to participate and understands they can discontinue the visit at any time  Patient is aware this is a billable service  Subjective  Frandy Hickey is a 71 y o  male status post a posterior cervical decompression fixation fusion with an anterior cervical decompression fixation fusion    In general he has been doing well  He did have a surgical site infection which has now cleared  Complains of some redness in the position of the collar  He has some neck stiffness in general but is doing fairly well  Family had questions about returning to driving (I recommended against this at this point) the collar (I have recommended removing this today) as well as physical therapy (I recommended beginning physical therapy after 2 weeks time) he and his wife asked informed questions reflecting her understanding  They tell me that his acute kidney injury is improving in his retaining less fluid  Past Medical History:   Diagnosis Date    Acute venous embolism and thrombosis of deep vessels of proximal lower extremity (HCC)     Last assessed: 5/18/15    Arthritis     Cellulitis     LE    Chronic kidney disease 3/2020    Acute Kidney Injury    CPAP (continuous positive airway pressure) dependence     Factor V Leiden ([address] )     Forgetfulness     Gross hematuria 5/17/2022    Headache(784 0) 3/2022    Never till cervical  spine surgery    Heart murmur 3/2020    Told when in hospital   [facility] (hard of hearing)     Hypoalbuminemia 5/11/2022    Paroxysmal atrial fibrillation (HCC)     Last assessed: 11/2/15    Sleep apnea        Past Surgical History:   Procedure Laterality Date    BACK SURGERY      Lower    COLON SURGERY      COLONOSCOPY      INCISION AND DRAINAGE POSTERIOR SPINE N/A 4/1/2022    Procedure: Posterior cervical evacuation of postoperative collection and debridement with placement of drains C3-T1;   Surgeon: Hammad Stokes MD;  [facility];  Service: Neurosurgery    IR BIOPSY KIDNEY RANDOM  5/26/2022    IR TUNNELED DIALYSIS CATHETER PLACEMENT  5/23/2022    IR TUNNELED DIALYSIS CATHETER REMOVAL  6/2/2022    GA ARTHRODESIS ANT INTERBODY MIN DISCECTOMY, CERVICAL BELOW C2 N/A 3/11/2022    Procedure: Anterior cervical discectomy and fixation fusion C5/6 and C6/7; Posterior cervical decompression and instrumented fusion C3-T1;   Surgeon: Uzair Grigsby MD;  [facility];  Service: Neurosurgery    SPINE SURGERY  3/11/2022    Cervical myelopathy/ cervical fusion       Current Outpatient Medications   Medication Sig Dispense Refill    Accu-Chek Guide test strip       acetaminophen (TYLENOL) 325 mg tablet Take 3 tablets (975 mg total) by mouth 3 (three) times a day as needed for mild pain  0    amLODIPine (NORVASC) 10 mg tablet Take 1 tablet (10 mg total) by mouth daily 30 tablet 0    Blood Glucose Monitoring Suppl (Accu-Chek Guide) w/Device KIT       calcium carbonate-vitamin D (OSCAL-D) 500 mg-200 units per tablet Take 1 tablet by mouth daily with breakfast 30 tablet 0    doxazosin (CARDURA) 1 mg tablet Take 1 tablet (1 mg total) by mouth daily at bedtime 30 tablet 0    flecainide (TAMBOCOR) 100 mg tablet TAKE 1 TABLET BY MOUTH  TWICE DAILY 180 tablet 1    gabapentin (NEURONTIN) 300 mg capsule Take 1 capsule (300 mg total) by mouth daily at bedtime 30 capsule 0    methocarbamol (ROBAXIN) 750 mg tablet Take 1 tablet (750 mg total) by mouth every 6 (six) hours as needed for muscle spasms 30 tablet 0    metoprolol succinate (TOPROL-XL) 100 mg 24 hr tablet TAKE 1 TABLET BY MOUTH  DAILY 90 tablet 0    ondansetron (ZOFRAN) 4 mg tablet Take 1 tablet (4 mg total) by mouth every 8 (eight) hours as needed for nausea or vomiting 30 tablet 0    pantoprazole (PROTONIX) 40 mg tablet Take 1 tablet (40 mg total) by mouth 2 (two) times a day before meals 30 tablet 0    pravastatin (PRAVACHOL) 40 mg tablet TAKE 1 TABLET BY MOUTH  DAILY 90 tablet 1    predniSONE 20 mg tablet Take 3 tablets (60 mg total) by mouth daily 270 tablet 0    sevelamer (RENAGEL) 800 mg tablet Take 1 tablet (800 mg total) by mouth 3 (three) times a day with meals 90 tablet 0    sulfamethoxazole-trimethoprim (BACTRIM DS) 800-160 mg per tablet Take 1 tablet by mouth 3 (three) times a week 36 tablet 0    tamsulosin (FLOMAX) 0 4 mg TAKE 1 CAPSULE BY MOUTH  DAILY WITH DINNER 90 capsule 1    warfarin (COUMADIN) 5 mg tablet Take by mouth daily Take 2 5 mg (1/2 tablets) daily except Wednesday and Saturday take 5 mg daily   docusate sodium (COLACE) 100 mg capsule Take 1 capsule (100 mg total) by mouth 2 (two) times a day (Patient not taking: No sig reported) 60 capsule 0    DULoxetine (CYMBALTA) 60 mg delayed release capsule TAKE 1 CAPSULE BY MOUTH  DAILY (Patient not taking: Reported on 6/7/2022) 90 capsule 1    lidocaine (LIDODERM) 5 % Apply 1 patch topically daily as needed (Back pain) Remove & Discard patch within 12 hours or as directed by MD (Patient not taking: No sig reported) 30 patch 0     No current facility-administered medications for this visit  Allergies   Allergen Reactions    Morphine GI Intolerance    Vitamin K Other (See Comments)     On coumadin-patient sensitive to vitamin K amounts in meds etc        Review of Systems   Constitutional: Positive for activity change  Negative for appetite change  HENT: Positive for hearing loss (wears B/L hearing aids)  Negative for trouble swallowing and voice change (hoarsness with lots of talking)  Eyes: Positive for photophobia  Respiratory: Negative  Cardiovascular: Negative  Gastrointestinal: Negative  Negative for constipation, nausea and vomiting  Endocrine: Negative  Genitourinary: Negative  Negative for frequency and urgency  Musculoskeletal: Positive for gait problem (using rolling walker)  Negative for myalgias, neck pain and neck stiffness (is currently wearing neck collar)  Per patient, better since last visit  0/10 pain in neck  7/10 pain in right ribs  Takes tylenol and methocarbamol for pain relief, helpful    PT - has not restarted  INJ - No     No falls    Patient was recently discharged from hospital 6/4/22 for kidney infection  Skin: Positive for rash (psoriasis)  Negative for wound  Allergic/Immunologic: Negative  Neurological: Positive for weakness (Right leg )  Negative for headaches  Hematological: Bruises/bleeds easily  Psychiatric/Behavioral: Negative  Negative for sleep disturbance  I reviewed the ROS  Video Exam    Vitals:    06/07/22 1124   Weight: 104 kg (230 lb)       Physical Exam  Constitutional:       Appearance: Normal appearance  He is normal weight  HENT:      Head: Normocephalic  Eyes:      Extraocular Movements: Extraocular movements intact  Pupils: Pupils are equal, round, and reactive to light  Neck:      Comments: His neck movements were animated and he had no facial expressions with regards to putting his neck in different positions  Musculoskeletal:         General: Normal range of motion  Skin:     Comments: There is erythema around the portion of the skin which was in contact with the collar  His incisions are clean and dry and healing well   Neurological:      General: No focal deficit present  Mental Status: He is alert  Psychiatric:         Mood and Affect: Mood normal          Behavior: Behavior normal          Thought Content: Thought content normal          Judgment: Judgment normal           I spent 20 minutes directly with the patient during this visit     X-rays of the cervical spine demonstrate the instrumentation to be in good position without signs of loosening or breakage    VIRTUAL VISIT [id number] [facility] verbally agrees to participate in [employer]  Pt is aware that [employer] could be limited without vital signs or the ability to perform a full hands-on physical exam  Tian Hall understands he or the provider may request at any time to terminate the video visit and request the patient to seek care or treatment in person

## 2022-06-07 NOTE — TELEPHONE ENCOUNTER
Appointment Confirmation   Person confirmed appointment with  If not patient, name of the person Patient wife Diane Rogers   Date and time of appointment 6/8   Patient acknowledged and will be at appointment? Yes   Did you advise the patient that they will need a urine sample if they are a new patient?  N/a   Did you advise the patient to bring their current medications for verification? (including any OTC) Virtual   Additional Information

## 2022-06-08 ENCOUNTER — TELEPHONE (OUTPATIENT)
Dept: FAMILY MEDICINE CLINIC | Facility: CLINIC | Age: 72
End: 2022-06-08

## 2022-06-08 ENCOUNTER — TELEMEDICINE (OUTPATIENT)
Dept: NEPHROLOGY | Facility: HOSPITAL | Age: 72
End: 2022-06-08
Attending: INTERNAL MEDICINE
Payer: COMMERCIAL

## 2022-06-08 ENCOUNTER — ANTICOAG VISIT (OUTPATIENT)
Dept: FAMILY MEDICINE CLINIC | Facility: CLINIC | Age: 72
End: 2022-06-08

## 2022-06-08 VITALS
SYSTOLIC BLOOD PRESSURE: 142 MMHG | DIASTOLIC BLOOD PRESSURE: 65 MMHG | BODY MASS INDEX: 32.2 KG/M2 | WEIGHT: 230 LBS | HEIGHT: 71 IN | HEART RATE: 77 BPM

## 2022-06-08 DIAGNOSIS — N02.8 IGA NEPHROPATHY DETERMINED BY BIOPSY OF KIDNEY: ICD-10-CM

## 2022-06-08 DIAGNOSIS — I50.32 CHRONIC DIASTOLIC (CONGESTIVE) HEART FAILURE (HCC): ICD-10-CM

## 2022-06-08 DIAGNOSIS — E83.52 HYPERCALCEMIA: ICD-10-CM

## 2022-06-08 DIAGNOSIS — N17.9 AKI (ACUTE KIDNEY INJURY) (HCC): Primary | ICD-10-CM

## 2022-06-08 DIAGNOSIS — R52 PAIN: ICD-10-CM

## 2022-06-08 DIAGNOSIS — N05.9 GLOMERULONEPHRITIS: ICD-10-CM

## 2022-06-08 DIAGNOSIS — N04.9 NEPHROTIC SYNDROME: ICD-10-CM

## 2022-06-08 DIAGNOSIS — R51.9 HEAD PAIN CEPHALGIA: ICD-10-CM

## 2022-06-08 DIAGNOSIS — R80.8 OTHER PROTEINURIA: ICD-10-CM

## 2022-06-08 DIAGNOSIS — I10 ESSENTIAL HYPERTENSION: ICD-10-CM

## 2022-06-08 DIAGNOSIS — Z98.890 POST-OPERATIVE STATE: ICD-10-CM

## 2022-06-08 DIAGNOSIS — R26.2 AMBULATORY DYSFUNCTION: ICD-10-CM

## 2022-06-08 DIAGNOSIS — E87.1 HYPONATREMIA: ICD-10-CM

## 2022-06-08 DIAGNOSIS — E83.39 HYPERPHOSPHATEMIA: ICD-10-CM

## 2022-06-08 DIAGNOSIS — D64.9 ANEMIA, UNSPECIFIED TYPE: ICD-10-CM

## 2022-06-08 PROCEDURE — 99215 OFFICE O/P EST HI 40 MIN: CPT | Performed by: INTERNAL MEDICINE

## 2022-06-08 PROCEDURE — 1111F DSCHRG MED/CURRENT MED MERGE: CPT | Performed by: INTERNAL MEDICINE

## 2022-06-08 RX ORDER — PANTOPRAZOLE SODIUM 40 MG/1
40 TABLET, DELAYED RELEASE ORAL
Qty: 30 TABLET | Refills: 0 | Status: SHIPPED | OUTPATIENT
Start: 2022-06-08 | End: 2022-06-16

## 2022-06-08 RX ORDER — FUROSEMIDE 20 MG/1
20 TABLET ORAL DAILY
Qty: 30 TABLET | Refills: 1 | Status: SHIPPED | OUTPATIENT
Start: 2022-06-08 | End: 2022-06-21 | Stop reason: SDUPTHER

## 2022-06-08 NOTE — PROGRESS NOTES
Virtual Regular Visit    Verification of patient location:    Patient is located in the following state in which I hold an active license [place]      Assessment/Plan:    Problem List Items Addressed This Visit        Cardiovascular and Mediastinum    Essential hypertension    Relevant Medications    furosemide (LASIX) 20 mg tablet    Chronic diastolic (congestive) heart failure (HCC)       Genitourinary    JANEL (acute kidney injury) ([facility] [address] ) - Primary    Relevant Medications    furosemide (LASIX) 20 mg tablet    Other Relevant Orders    Urinalysis with microscopic    Protein / creatinine ratio, urine    CBC and differential    Comprehensive metabolic panel    Glomerulonephritis    Relevant Medications    pantoprazole (PROTONIX) 40 mg tablet    furosemide (LASIX) 20 mg tablet    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    Relevant Medications    furosemide (LASIX) 20 mg tablet    Other Relevant Orders    Urinalysis with microscopic    Protein / creatinine ratio, urine    CBC and differential    Comprehensive metabolic panel       Other    Hyponatremia    Anemia    Ambulatory dysfunction    Relevant Medications    pantoprazole (PROTONIX) 40 mg tablet    Other proteinuria    Hypercalcemia    Hyperphosphatemia      Other Visit Diagnoses     Nephrotic syndrome        Relevant Medications    furosemide (LASIX) 20 mg tablet        1  JANEL d/t IgAN, biopsy proven, in setting of recent MRSA infection  -renal biopsy performed May 26, 20 102 was limited as only 6 intact glomeruli were present for evaluation on light microscopy  IgA dominant immune complex deposition noted by immunofluorescence  Differential includes IgA nephropathy both primary and secondary forms versus infectious associated glomerulonephritis  Staph aureus infections have been associated with IgA dominant immune complex deposition    Patient did have recent MRSA surgical site infection so infection associated GN should be considered   -Patient currently on prednisone 60 mg daily begun June 2nd   -on Bactrim 3 times weekly for PJP prophylaxis   -on Protonix 40 mg twice daily for GI prophylaxis  -continue oscal D for bone protection on high dose steroids  -continue daily prednisone 60mg for now  -monitor weekly bloodwork and urine testing  -if this was a primary IgA nephropathy, it would be compatible with Bleckley classification of M0 E1 S0 T1-C1   -of 38 glomeruli, 6 were intact, forehead global glomerulosclerosis, segmental sclerosis was absent  Moderate interstitial fibrosis and tubular atrophy as well as arterial intimal fibrosis and mild arterolar hyalinosis were noted  -did not require dialysis inpatient, permacath removed prior to discharge  -sCr now 4 71, slowly improving from 5 34  -BUN elevated to 85  No uremic signs/symptoms to warrant HD initiation    -as the patient has had an acute onset of rapidly progressive glomerulonephritis with normal complement levels and deposition of mesangial IgA, 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    2  Hyponatremia, hypercalcemia - resolved    3  Hyperphosphatemia - phos 5 5 as of 6/3/22, repeat phos  Currently on Renagel 800 mg 3 times daily with meals  Likely d/t decreased excretion in setting of JANEL    4  Proteinuria - UpCr in setting of IgAN shows UpCr improved from 12 4g to 4 2g as of 6/6/22      5  HTN - BP well controlled, continue metoprolol 100 mg daily, Cardura 1 mg at bedtime, amlodipine 10 mg daily    6  Anemia - Hgb 8 5, monitor CBC weekly    7  LE edema likely d/t nephrotic syndrome - begin lasix 20mg daily, will monitor K/SCr weekly  Monitor daily weight/BP readings  Will order weekly CMP, CBC, Urinalysis and urine protein:Cr, phos  Continue current prophylaxis and 60mg prednisone daily  Begin lasix 20mg daily for edema/nephrotic syndrome      Reason for visit is   Chief Complaint   Patient presents with    Virtual Regular Visit    Acute Renal Failure Encounter provider Jada Bryant DO    Provider located at [address]  [address] [place] [address]      Recent Visits  No visits were found meeting these conditions  Showing recent visits within past 7 days and meeting all other requirements  Today's Visits  Date Type Provider Dept   06/08/22 Telemedicine Renata Roblero DO [facility]   Showing today's visits and meeting all other requirements  Future Appointments  No visits were found meeting these conditions  Showing future appointments within next 150 days and meeting all other requirements       The patient was identified by name and date of birth  Opal Gonzalez was informed that this is a telemedicine visit and that the visit is being conducted through [facility] [employer] and patient was informed this is a secure, HIPAA-complaint platform  He agrees to proceed     My office door was closed  No one else was in the room  He acknowledged consent and understanding of privacy and security of the video platform  The patient has agreed to participate and understands they can discontinue the visit at any time  Patient is aware this is a billable service  Subjective  Opal Gonzalez is a 71 y o  male presents in [facility] hospital follow up of JANEL d/t biopsy proven IgAN  The patient presents in virtual follow up s/p hospitalization for gross hematuria and JANEL in setting of treatment for MRSA surgical site infection s/p cervical surgery  Patient discharged on prednisone 60mg daily, begun June 2, 2022  Also on bactrim and PPI for prophylaxis  Ankles and legs swollen since getting home  On prednisone  Feels well  BP has been ok at home           Past Medical History:   Diagnosis Date    Acute venous embolism and thrombosis of deep vessels of proximal lower extremity (HCC)     Last assessed: 5/18/15    Arthritis     Cellulitis     LE    Chronic kidney disease 3/2020 Acute Kidney Injury    CPAP (continuous positive airway pressure) dependence     Factor V Leiden ([address] )     Forgetfulness     Gross hematuria 5/17/2022    Headache(784 0) 3/2022    Never till cervical  spine surgery    Heart murmur 3/2020    Told when in hospital   [facility] (hard of hearing)     Hypoalbuminemia 5/11/2022    Paroxysmal atrial fibrillation (HCC)     Last assessed: 11/2/15    Sleep apnea        Past Surgical History:   Procedure Laterality Date    BACK SURGERY      Lower    COLON SURGERY      COLONOSCOPY      INCISION AND DRAINAGE POSTERIOR SPINE N/A 4/1/2022    Procedure: Posterior cervical evacuation of postoperative collection and debridement with placement of drains C3-T1; Surgeon: Dunia Agustin MD;  [facility];  Service: Neurosurgery    IR BIOPSY KIDNEY RANDOM  5/26/2022    IR TUNNELED DIALYSIS CATHETER PLACEMENT  5/23/2022    IR TUNNELED DIALYSIS CATHETER REMOVAL  6/2/2022    CA ARTHRODESIS ANT INTERBODY MIN DISCECTOMY, CERVICAL BELOW C2 N/A 3/11/2022    Procedure: Anterior cervical discectomy and fixation fusion C5/6 and C6/7; Posterior cervical decompression and instrumented fusion C3-T1;   Surgeon: Dunia Agustin MD;  [facility];  Service: Neurosurgery    SPINE SURGERY  3/11/2022    Cervical myelopathy/ cervical fusion       Current Outpatient Medications   Medication Sig Dispense Refill    acetaminophen (TYLENOL) 325 mg tablet Take 3 tablets (975 mg total) by mouth 3 (three) times a day as needed for mild pain  0    amLODIPine (NORVASC) 10 mg tablet Take 1 tablet (10 mg total) by mouth daily 30 tablet 0    calcium carbonate-vitamin D (OSCAL-D) 500 mg-200 units per tablet Take 1 tablet by mouth daily with breakfast 30 tablet 0    doxazosin (CARDURA) 1 mg tablet Take 1 tablet (1 mg total) by mouth daily at bedtime 30 tablet 0    flecainide (TAMBOCOR) 100 mg tablet TAKE 1 TABLET BY MOUTH  TWICE DAILY 180 tablet 1    furosemide (LASIX) 20 mg tablet Take 1 tablet (20 mg total) by mouth daily 30 tablet 1    gabapentin (NEURONTIN) 300 mg capsule Take 1 capsule (300 mg total) by mouth daily at bedtime 30 capsule 0    methocarbamol (ROBAXIN) 750 mg tablet Take 1 tablet (750 mg total) by mouth every 6 (six) hours as needed for muscle spasms 30 tablet 0    metoprolol succinate (TOPROL-XL) 100 mg 24 hr tablet TAKE 1 TABLET BY MOUTH  DAILY 90 tablet 0    ondansetron (ZOFRAN) 4 mg tablet Take 1 tablet (4 mg total) by mouth every 8 (eight) hours as needed for nausea or vomiting 30 tablet 0    pantoprazole (PROTONIX) 40 mg tablet Take 1 tablet (40 mg total) by mouth 2 (two) times a day before meals 30 tablet 0    pravastatin (PRAVACHOL) 40 mg tablet TAKE 1 TABLET BY MOUTH  DAILY 90 tablet 1    predniSONE 20 mg tablet Take 3 tablets (60 mg total) by mouth daily 270 tablet 0    sevelamer (RENAGEL) 800 mg tablet Take 1 tablet (800 mg total) by mouth 3 (three) times a day with meals 90 tablet 0    sulfamethoxazole-trimethoprim (BACTRIM DS) 800-160 mg per tablet Take 1 tablet by mouth 3 (three) times a week 36 tablet 0    tamsulosin (FLOMAX) 0 4 mg TAKE 1 CAPSULE BY MOUTH  DAILY WITH DINNER 90 capsule 1    warfarin (COUMADIN) 5 mg tablet Take by mouth daily Take 2 5 mg (1/2 tablets) daily except Wednesday and Saturday take 5 mg daily   Accu-Chek Guide test strip  (Patient not taking: Reported on 6/8/2022)      Blood Glucose Monitoring Suppl (Accu-Chek Guide) w/Device KIT  (Patient not taking: Reported on 6/8/2022)       No current facility-administered medications for this visit  Allergies   Allergen Reactions    Morphine GI Intolerance    Vitamin K Other (See Comments)     On coumadin-patient sensitive to vitamin K amounts in meds etc        Review of Systems   Constitutional: Negative for chills and fever  HENT: Negative for sore throat  Eyes: Negative for visual disturbance  Respiratory: Negative for cough and shortness of breath  Cardiovascular: Positive for leg swelling (both legs)  Negative for chest pain  Gastrointestinal: Negative for abdominal pain, constipation, diarrhea, nausea and vomiting  Endocrine: Negative for polyuria  Genitourinary: Negative for decreased urine volume, difficulty urinating, dysuria and hematuria  Musculoskeletal: Positive for back pain (right side around rib cage, loses breath with turning sometimes)  Negative for myalgias  Skin: Negative for rash  Neurological: Negative for dizziness, light-headedness and numbness  Psychiatric/Behavioral: Negative for confusion  Video Exam    Vitals:    06/08/22 1337   BP: 142/65   BP Location: Left arm   Patient Position: Sitting   Cuff Size: Standard   Pulse: 77   Weight: 104 kg (230 lb)   Height: 5' 11" (1 803 m)       Physical Exam  Vitals reviewed  Constitutional:       Appearance: Normal appearance  He is not ill-appearing  HENT:      Head: Normocephalic and atraumatic  Nose: Nose normal       Mouth/Throat:      Mouth: Mucous membranes are moist       Pharynx: No oropharyngeal exudate or posterior oropharyngeal erythema  Eyes:      General: No scleral icterus  Right eye: No discharge  Left eye: No discharge  Extraocular Movements: Extraocular movements intact  Cardiovascular:      Comments: B/l LE edema  Pulmonary:      Effort: Pulmonary effort is normal       Breath sounds: No wheezing  Abdominal:      General: There is no distension  Palpations: Abdomen is soft  Tenderness: There is no abdominal tenderness  Musculoskeletal:         General: Swelling (b/l LE) present  Cervical back: Normal range of motion  Skin:     Coloration: Skin is not jaundiced  Neurological:      General: No focal deficit present  Mental Status: He is alert     Psychiatric:         Mood and Affect: Mood normal          Behavior: Behavior normal           I spent 30 minutes directly with the patient during this visit    VIRTUAL VISIT DISCLAIMER      Theo Kitchen verbally agrees to participate in [facility]  Pt is aware that [facility] could be limited without vital signs or the ability to perform a full hands-on physical exam  Tian Tariq understands he or the provider may request at any time to terminate the video visit and request the patient to seek care or treatment in person

## 2022-06-08 NOTE — TELEPHONE ENCOUNTER
----- Message from Karina Danielle MD sent at 6/7/2022  8:05 AM EDT -----  Call patient with lab result-same  I believe he is out of the hospital   Should probably check in 1 week

## 2022-06-09 ENCOUNTER — DOCUMENTATION (OUTPATIENT)
Dept: NEPHROLOGY | Facility: HOSPITAL | Age: 72
End: 2022-06-09

## 2022-06-09 ENCOUNTER — TELEPHONE (OUTPATIENT)
Dept: FAMILY MEDICINE CLINIC | Facility: CLINIC | Age: 72
End: 2022-06-09

## 2022-06-09 DIAGNOSIS — N02.8 IGA NEPHROPATHY DETERMINED BY BIOPSY OF KIDNEY: ICD-10-CM

## 2022-06-09 RX ORDER — PREDNISONE 20 MG/1
TABLET ORAL
Qty: 270 TABLET | Refills: 0
Start: 2022-06-09

## 2022-06-09 NOTE — TELEPHONE ENCOUNTER
Yaquelin Lee from [facility] wanted to follow up with Fariba Negrete, she recieved Dr Khanh Dominguez referral and saw patient for evaluation today 6/9 and patient is refusing further OT

## 2022-06-09 NOTE — PROGRESS NOTES
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  There is an underlying [place] cytopathy that has responded to prednisone well  After 4 days of prednisone 60 mg a day, the patient's  urine protein to creatinine ratio improved from 12 4 g down to 4 2 g   I am hopeful for ongoing improvement in renal function  We will continue weekly lab monitoring including blood and urine testing  We will continue prednisone 60 mg a day along with GI and PJP prophylaxis for a total of 4 weeks  We will then taper down to 40 mg a day for 2 weeks, with ongoing taper every 2 weeks  We will consider genetic testing with the [facility] for complement deficiency in the setting of these findings  We will discuss this with patient at next office visit

## 2022-06-10 RX ORDER — DOXAZOSIN MESYLATE 1 MG/1
1 TABLET ORAL
Qty: 30 TABLET | Refills: 0 | Status: SHIPPED | OUTPATIENT
Start: 2022-06-10 | End: 2022-08-01 | Stop reason: SDUPTHER

## 2022-06-10 RX ORDER — METHOCARBAMOL 750 MG/1
750 TABLET, FILM COATED ORAL EVERY 6 HOURS PRN
Qty: 30 TABLET | Refills: 0 | Status: SHIPPED | OUTPATIENT
Start: 2022-06-10

## 2022-06-10 RX ORDER — GABAPENTIN 300 MG/1
300 CAPSULE ORAL
Qty: 30 CAPSULE | Refills: 0 | Status: SHIPPED | OUTPATIENT
Start: 2022-06-10 | End: 2022-06-21 | Stop reason: SDUPTHER

## 2022-06-10 NOTE — PROGRESS NOTES
Spoke with patient's wife Roula Yeager  Understood the plan and does not have any further questions at this time

## 2022-06-10 NOTE — TELEPHONE ENCOUNTER
Patient wife informed ,requesting this rx to be send to local pharmacy       Virtual [facility] for 06/15/2022

## 2022-06-10 NOTE — PROGRESS NOTES
Left message with patient's wife Praveena Tijerina to give us a call back and go over patients medication for prednisone    Please let the patient's wife know that we will continue prednisone 60 mg a day as his protein in the urine is improving   We will continue this for a total of 4 weeks   Starting on June 30th, we will taper down to 40 mg a day for 2 weeks, 30 mg for 2 weeks, 20 mg for 2 weeks and so far it is and so on until the next office visit  [facility] should continue taking Bactrim 3 times weekly, Protonix for GI prophylaxis, and Os-Kermit D  we will also continue weekly lab monitoring   If there are any changes in labs that suggest the prednisone is ineffective, we will call and discuss again  Lakeshia Ballard

## 2022-06-11 ENCOUNTER — HOME HEALTH ADMISSION (OUTPATIENT)
Dept: HOME HEALTH SERVICES | Facility: HOME HEALTHCARE | Age: 72
End: 2022-06-11

## 2022-06-13 ENCOUNTER — PATIENT OUTREACH (OUTPATIENT)
Dept: FAMILY MEDICINE CLINIC | Facility: CLINIC | Age: 72
End: 2022-06-13

## 2022-06-13 NOTE — PROGRESS NOTES
PEBBLES ISSA left pt spouse a message to return call  Erling Moritz returned call and communicated that she was going to mail pt application today and just wanted to verify with PEBBLES ISSA to send pt application to the [facility] address on last page of application, which PEBBLES ISSA confirmed with Erling Moritz  Will F/U by 6/20

## 2022-06-13 NOTE — PROGRESS NOTES
Outpatient Care Management Note:    Voice mail message left for Marvamaxi Cadetlow and his wife, Erling Moritz, with my contact information, requesting a call back

## 2022-06-14 ENCOUNTER — TELEPHONE (OUTPATIENT)
Dept: NEPHROLOGY | Facility: CLINIC | Age: 72
End: 2022-06-14

## 2022-06-14 NOTE — TELEPHONE ENCOUNTER
Patient's wife called to check if we had received the patient's lab results yet  I informed her that we had not received them yet  She expressed concern over us not having them yet, I provided her with the office fax number of [phone] and advised her to call [facility] and ask them to fax them over  Patient also called to double check that her  is supposed to be taking 60 mg of prednisone for now  I confirmed that he is supposed to be taking 60 mg for four weeks  She said that that was what she had understood from Dr Ting Cazares and said that following the four weeks she will decrease his dosage to 40mg for two weeks

## 2022-06-15 ENCOUNTER — TELEMEDICINE (OUTPATIENT)
Dept: FAMILY MEDICINE CLINIC | Facility: CLINIC | Age: 72
End: 2022-06-15
Payer: COMMERCIAL

## 2022-06-15 DIAGNOSIS — I48.0 PAROXYSMAL A-FIB (HCC): ICD-10-CM

## 2022-06-15 DIAGNOSIS — N13.8 BPH WITH OBSTRUCTION/LOWER URINARY TRACT SYMPTOMS: Primary | ICD-10-CM

## 2022-06-15 DIAGNOSIS — N17.9 AKI (ACUTE KIDNEY INJURY) (HCC): ICD-10-CM

## 2022-06-15 DIAGNOSIS — R07.81 RIB PAIN ON RIGHT SIDE: ICD-10-CM

## 2022-06-15 DIAGNOSIS — D68.51 FACTOR V LEIDEN MUTATION (HCC): ICD-10-CM

## 2022-06-15 DIAGNOSIS — Z79.01 ANTICOAGULATED ON COUMADIN: ICD-10-CM

## 2022-06-15 DIAGNOSIS — N40.1 BPH WITH OBSTRUCTION/LOWER URINARY TRACT SYMPTOMS: Primary | ICD-10-CM

## 2022-06-15 DIAGNOSIS — G95.9 CERVICAL MYELOPATHY (HCC): ICD-10-CM

## 2022-06-15 DIAGNOSIS — R73.01 IFG (IMPAIRED FASTING GLUCOSE): ICD-10-CM

## 2022-06-15 PROCEDURE — 99495 TRANSJ CARE MGMT MOD F2F 14D: CPT | Performed by: FAMILY MEDICINE

## 2022-06-15 RX ORDER — OXYCODONE HYDROCHLORIDE 5 MG/1
5 TABLET ORAL EVERY 6 HOURS PRN
Qty: 10 TABLET | Refills: 0 | Status: SHIPPED | OUTPATIENT
Start: 2022-06-15

## 2022-06-15 RX ORDER — TAMSULOSIN HYDROCHLORIDE 0.4 MG/1
0.4 CAPSULE ORAL
Qty: 90 CAPSULE | Refills: 1 | Status: SHIPPED | OUTPATIENT
Start: 2022-06-15

## 2022-06-15 RX ORDER — LANCETS
EACH MISCELLANEOUS
Qty: 200 EACH | Refills: 2 | Status: SHIPPED | OUTPATIENT
Start: 2022-06-15

## 2022-06-15 NOTE — PROGRESS NOTES
Virtual TCM Visit:    Verification of patient location:    Patient is located in the following state in which I hold an active license [place]        Assessment/Plan:        Problem List Items Addressed This Visit        Endocrine    IFG (impaired fasting glucose)    Relevant Medications    Accu-Chek Softclix Lancets lancets       Cardiovascular and Mediastinum    Paroxysmal A-fib ([facility] )       Nervous and Auditory    Cervical myelopathy (HCC)       Hematopoietic and Hemostatic    Factor V Leiden mutation ([facility] )    Relevant Orders    Protime-INR       Genitourinary    JANEL (acute kidney injury) ([facility] )    Relevant Orders    Protime-INR      Other Visit Diagnoses     BPH with obstruction/lower urinary tract symptoms    -  Primary    Relevant Medications    tamsulosin (FLOMAX) 0 4 mg    Rib pain on right side        Relevant Medications    oxyCODONE (ROXICODONE) 5 immediate release tablet    Anticoagulated on Coumadin        Relevant Orders    Protime-INR             Reason for visit is TCM    Encounter provider Kristen Barfield MD       Provider located at [address] [place] [id number]      Recent Visits  Date Type Provider Dept   06/09/22 Telephone Natividad Morrison [facility] [facility]   06/08/22 Telephone Henny Gallegos [facility] [address] [facility] recent visits within past 7 days and meeting all other requirements  Today's Visits  Date Type Provider Dept   06/15/22 [address] MD [facility] [address] [facility] today's visits and meeting all other requirements  Future Appointments  No visits were found meeting these conditions  Showing future appointments within next 150 days and meeting all other requirements       After connecting through [employer], the patient was identified by name and date of birth   Alvaro Rai was informed that this is a telemedicine visit and that the visit is being conducted through [address] [place] [address] Embedded and patient was informed this is a secure, HIPAA-complaint platform  He agrees to proceed     My office door was closed  No one else was in the room  He acknowledged consent and understanding of privacy and security of the video platform  The patient has agreed to participate and understands they can discontinue the visit at any time  Patient is aware this is a billable service  Subjective:     Patient ID: Gilberto Jennings is a 71 y o  male  TCM Call (since 5/15/2022)     Date and time call was made  6/6/2022 10:00 AM    Hospital care reviewed  Records reviewed    Patient was hospitialized at  [facility]    Date of Admission  05/10/22    Date of discharge  06/04/22    Diagnosis  JANEL (acute kidney injury)    Disposition  Home    Were the patients medications reviewed and updated  Yes      TCM Call (since 5/15/2022)     Living Arrangements  Spouse or Significiant other      Hospital records reviewed  Meds reconciled  Labs from yesterday reviewed  Kidney function is improving  PT INR was missed on the order sheet  Patient's levels have been stable  Review of Systems   Constitutional: Positive for fatigue  Negative for activity change, appetite change, diaphoresis and fever  Respiratory: Negative for cough, chest tightness, shortness of breath and wheezing  Cardiovascular: Negative for chest pain, palpitations and leg swelling  Fast or slow heart rate   Genitourinary: Negative for difficulty urinating, dysuria, frequency and hematuria  Musculoskeletal: Positive for arthralgias  Negative for gait problem, joint swelling and myalgias  Neurological: Negative for dizziness, light-headedness and headaches  Psychiatric/Behavioral: Negative for agitation, confusion, dysphoric mood and sleep disturbance  The patient is not nervous/anxious  Objective: There were no vitals filed for this visit      Physical Exam  Constitutional:       General: He is not in acute distress  Appearance: Normal appearance  HENT:      Head: Normocephalic and atraumatic  Pulmonary:      Effort: Pulmonary effort is normal    Neurological:      Mental Status: He is alert  Psychiatric:         Behavior: Behavior normal              Transitional Care Management Review:  Emmanuel Ramos is a 71 y o  male here for TCM follow up  During the TCM phone call patient stated:    TCM Call (since 5/15/2022)     Date and time call was made  6/6/2022 10:00 AM    Hospital care reviewed  Records reviewed    Patient was hospitialized at  [address] [place] [address]    Date of Admission  05/10/22    Date of discharge  06/04/22    Diagnosis  JANEL (acute kidney injury)    Disposition  Home    Were the patients medications reviewed and updated  Yes      TCM Call (since 5/15/2022)     Living Arrangements  Spouse or Significiant other        Patient Instructions   Continue current medications  Follow-up with multiple specialist   Prescription sent in  I spent 25 minutes with the patient during this visit  Richar Cheng MD      VIRTUAL VISIT DISCLAIMER    Emmanuel Ramos verbally agrees to participate in [employer]  Pt is aware that [employer] could be limited without vital signs or the ability to perform a full hands-on physical exam  Tian Day understands he or the provider may request at any time to terminate the video visit and request the patient to seek care or treatment in person

## 2022-06-16 ENCOUNTER — TELEPHONE (OUTPATIENT)
Dept: NEPHROLOGY | Facility: CLINIC | Age: 72
End: 2022-06-16

## 2022-06-16 DIAGNOSIS — N05.9 GLOMERULONEPHRITIS: ICD-10-CM

## 2022-06-16 DIAGNOSIS — R26.2 AMBULATORY DYSFUNCTION: ICD-10-CM

## 2022-06-16 RX ORDER — PANTOPRAZOLE SODIUM 40 MG/1
TABLET, DELAYED RELEASE ORAL
Qty: 180 TABLET | Refills: 1 | Status: SHIPPED | OUTPATIENT
Start: 2022-06-16 | End: 2022-06-21 | Stop reason: SDUPTHER

## 2022-06-16 NOTE — TELEPHONE ENCOUNTER
I spoke with patient's wife Adrien Mcarthur informed her Dr Kwame Sivla is okay with Ciera Clayton seeing a AP, scheduled for soonest appointment 06/21/22 with Machelle Vazquez in [place]  Adrien Mcarthur stated she would like for Dr Kwame Silva to go over information concerning the patient with Machelle Vazquez prior to pt being seen

## 2022-06-16 NOTE — TELEPHONE ENCOUNTER
I spoke to the patient's wife Keli Elias in regards to scheduling a follow up appointment unfortunately Dr Laura Prakash next availability is not until August 30th  I offered for Beatriz Padron to be schedule with an AP she denied and would like to know if Dr Roberta Bermudez approves for him to be seen by a AP  Please advise

## 2022-06-16 NOTE — TELEPHONE ENCOUNTER
----- Message from Gonzales Stubbs DO sent at 6/16/2022 10:01 AM EDT -----  Regarding: FW: Follow-up post hospital    ----- Message -----  From: Mikael Dean MD  Sent: 6/15/2022   3:46 PM EDT  To: Yoav Taylor, [facility] ([place], [place]), Gonzales Stubbs DO  Subject: Follow-up post hospital                          I had virtual medicine TCM with patient today  He is home from the hospital once again  Patient and his wife are not exactly sure when he should make appointments to follow-up  Did have laboratory studies yesterday  Could your staff please contact patient and arrange for appropriate follow-up  Thanks

## 2022-06-20 ENCOUNTER — TELEPHONE (OUTPATIENT)
Dept: NEPHROLOGY | Facility: CLINIC | Age: 72
End: 2022-06-20

## 2022-06-20 ENCOUNTER — PATIENT OUTREACH (OUTPATIENT)
Dept: FAMILY MEDICINE CLINIC | Facility: CLINIC | Age: 72
End: 2022-06-20

## 2022-06-20 NOTE — TELEPHONE ENCOUNTER
Appointment Confirmation   Person confirmed appointment with  If not patient, name of the person Voice msg    Date and time of appointment 6/21   10:30    Patient acknowledged and will be at appointment? no    Did you advise the patient that they will need a urine sample if they are a new patient?  N/A    Did you advise the patient to bring their current medications for verification? (including any OTC) Yes    Additional Information

## 2022-06-20 NOTE — PROGRESS NOTES
Outpatient Care Management Note:    Care manager called Kiana Varela and his wife, Nancy Benitez states that things are going well  Kiana Varela had blood work completed this morning  He drove himself  CM did review that the neurosurgeon did note that he did not recommend driving at his last appt  Nancy Benitez will call the neurosurgeon to review  VNA services ended and Kiana Varela will be starting outpatient therapy  He is no longer wearing the cervical collar  We reviewed that Kiana Varela still needs to schedule an appt with cardiology  They will call to schedule  We discussed monitoring blood sugars  Nancy Benitez states they did learn to use the glucometer but have not been doing it  He does get weekly blood work for nephrology which includes a sugar  Nancy Benitez will begin testing blood sugars once a day  They will contact Dr Redmond Early if the sugars are trending higher  Kiana Varela also checks daily weights  He states that he weighed 236 lbs  He does have leg swelling and is now on lasix  Over the weekend, his legs were weeping but this stopped  We reviewed that they should call nephrology if his weight increases by 3 lb in 1 day or 5lb in 1 week  Nancy Benitez denies any issues with the cervical incision  She also states he has not had any further visible blood in his urine  Nancy Benitez has my contact information and will call with any questions  PEBBLES reviewed that I need to complete initial assessment  They agreed to completing this next week

## 2022-06-20 NOTE — PROGRESS NOTES
CM OC spoke to Charlesetta Mortimer with [address] [place] [address] Street communicated that Tony Perrin was enrolled for transportation and that he will need to try to schedule rides onlt fro the morning up until 2pm, because they are not allowing new pt's enrolled to schedule any later then 2pm, due to [facility] having an increase in Dialysis pt's  CM OC responded that was fine and will let pt know of status  CM OC then spoke with pt spouse Donnakamille Lizzie and communicated that Tony Perrin is enrolled for [facility] and that he or her can only schedule pt rides from the mornings up to 2 pm and they will need to be scheduled 24-48 hours in advance notice   Shazia Samuel responded that is great and no problem and they appreciate CM OC help with everything  CM OC said that the couple were welcome and now that pt goal has been addressed, pt will be closed by CM OC  Shazia Samuel responded that is fine & that was all they needed

## 2022-06-21 ENCOUNTER — TELEPHONE (OUTPATIENT)
Dept: NEPHROLOGY | Facility: CLINIC | Age: 72
End: 2022-06-21

## 2022-06-21 ENCOUNTER — OFFICE VISIT (OUTPATIENT)
Dept: NEPHROLOGY | Facility: CLINIC | Age: 72
End: 2022-06-21
Payer: COMMERCIAL

## 2022-06-21 ENCOUNTER — ANTICOAG VISIT (OUTPATIENT)
Dept: FAMILY MEDICINE CLINIC | Facility: CLINIC | Age: 72
End: 2022-06-21

## 2022-06-21 ENCOUNTER — TELEPHONE (OUTPATIENT)
Dept: OTHER | Facility: OTHER | Age: 72
End: 2022-06-21

## 2022-06-21 VITALS
HEIGHT: 71 IN | DIASTOLIC BLOOD PRESSURE: 72 MMHG | WEIGHT: 242 LBS | OXYGEN SATURATION: 97 % | SYSTOLIC BLOOD PRESSURE: 122 MMHG | BODY MASS INDEX: 33.88 KG/M2 | HEART RATE: 56 BPM

## 2022-06-21 DIAGNOSIS — N04.9 NEPHROTIC SYNDROME: ICD-10-CM

## 2022-06-21 DIAGNOSIS — E83.39 HYPERPHOSPHATEMIA: ICD-10-CM

## 2022-06-21 DIAGNOSIS — N05.9 GLOMERULONEPHRITIS: ICD-10-CM

## 2022-06-21 DIAGNOSIS — N17.9 AKI (ACUTE KIDNEY INJURY) (HCC): Primary | ICD-10-CM

## 2022-06-21 DIAGNOSIS — I10 ESSENTIAL HYPERTENSION: ICD-10-CM

## 2022-06-21 DIAGNOSIS — R51.9 HEAD PAIN CEPHALGIA: ICD-10-CM

## 2022-06-21 DIAGNOSIS — N02.8 IGA NEPHROPATHY DETERMINED BY BIOPSY OF KIDNEY: ICD-10-CM

## 2022-06-21 DIAGNOSIS — R26.2 AMBULATORY DYSFUNCTION: ICD-10-CM

## 2022-06-21 PROCEDURE — 3074F SYST BP LT 130 MM HG: CPT | Performed by: PHYSICIAN ASSISTANT

## 2022-06-21 PROCEDURE — 99215 OFFICE O/P EST HI 40 MIN: CPT | Performed by: PHYSICIAN ASSISTANT

## 2022-06-21 PROCEDURE — 1111F DSCHRG MED/CURRENT MED MERGE: CPT | Performed by: PHYSICIAN ASSISTANT

## 2022-06-21 PROCEDURE — 3078F DIAST BP <80 MM HG: CPT | Performed by: PHYSICIAN ASSISTANT

## 2022-06-21 RX ORDER — PANTOPRAZOLE SODIUM 40 MG/1
40 TABLET, DELAYED RELEASE ORAL
Qty: 180 TABLET | Refills: 3 | Status: SHIPPED | OUTPATIENT
Start: 2022-06-21 | End: 2022-09-19

## 2022-06-21 RX ORDER — AMLODIPINE BESYLATE 10 MG/1
10 TABLET ORAL DAILY
Qty: 90 TABLET | Refills: 3 | Status: SHIPPED | OUTPATIENT
Start: 2022-06-21 | End: 2022-08-04 | Stop reason: SDUPTHER

## 2022-06-21 RX ORDER — SULFAMETHOXAZOLE AND TRIMETHOPRIM 800; 160 MG/1; MG/1
1 TABLET ORAL 3 TIMES WEEKLY
Qty: 36 TABLET | Refills: 3 | Status: SHIPPED | OUTPATIENT
Start: 2022-06-22 | End: 2022-09-20

## 2022-06-21 RX ORDER — FUROSEMIDE 20 MG/1
40 TABLET ORAL DAILY
Qty: 90 TABLET | Refills: 3 | Status: SHIPPED | OUTPATIENT
Start: 2022-06-21 | End: 2022-06-28 | Stop reason: ALTCHOICE

## 2022-06-21 RX ORDER — GABAPENTIN 300 MG/1
300 CAPSULE ORAL
Qty: 90 CAPSULE | Refills: 3 | Status: SHIPPED | OUTPATIENT
Start: 2022-06-21 | End: 2022-07-19

## 2022-06-21 RX ORDER — FUROSEMIDE 20 MG/1
20 TABLET ORAL DAILY
Qty: 90 TABLET | Refills: 3 | Status: SHIPPED | OUTPATIENT
Start: 2022-06-21 | End: 2022-06-21

## 2022-06-21 NOTE — PROGRESS NOTES
OFFICE FOLLOW UP - Nephrology   Maria Ines Connolly 71 y o  male MRN: [id number]       ASSESSMENT/PLAN:  1  Acute kidney injury: Due to biopsy-proven IgA nephropathy and recent MRSA infection  Creatinine peaked at 5 3 and is now improving down to 3 67  Also had repeat CMP yesterday which is still pending so will follow up on those results  Baseline creatinine <1 as recently as April 2022   · renal biopsy performed May 26, 2022 was limited as only 6 intact glomeruli were present for evaluation on light microscopy  IgA dominant immune complex deposition noted by immunofluorescence  Differential includes IgA nephropathy both primary and secondary forms versus infectious associated glomerulonephritis  Staph aureus infections have been associated with IgA dominant immune complex deposition  Patient did have recent MRSA surgical site infection so infection associated GN should be considered  · Case discussed between Dr Jefferson Loaiza and Dr Kwame Silva -- 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  · Currently on prednisone 60 mg daily starting June 2nd  Taper to 40mg qdaily x 2 weeks starting June 30, then 30mg x 2 weeks, 20mg x 2 weeks, 10mg x 2 weeks and then will rediscuss   · Also on Bactrim 3 times weekly for PJP prophylaxis and Protonix 40 mg b i d  for GI prophylaxis  2  Nephrotic range proteinuria:  Initial upc ratio 12 4 g and now improving to 4 2 g  Continue prednisone taper as above   3  Hypertension: BP well controlled   Continue current medications  4  Anemia:  Last hemoglobin fairly stable at 8  5  Bilateral lower extremity edema: Likely due to nephrotic syndrome  Started on Lasix 20 mg daily recently  Still with significant LE edema and recent weeping in his legs  If creatinine improving on yesterday's labs will increase lasix   6  Hyperphosphatemia: On Renagel 800 mg 3 times daily with meals   Last phos down to 4 5 on 5/31 so will repeat phos with next labs and likely can d/c renagel given that creatinine is improving   7  Ambulatory dysfunction:  Patient very frustrated given his current functional status  We discussed    Plan:   Repeat labs in 1 week   Follow up with Dr Mabel Seip in 1 month  Will likely increase lasix based on results of yesterday's lab work   Discussed plan with Dr Mabel Seip     Spent 45 minutes directly with patient counseling/answering questions and discussing his disease  PATIENT INSTRUCTIONS:  Patient Instructions    We will follow up yesterday's blood work  If kidney function still getting better will increase lasix    Repeat blood work in 1 week    Repeat phosphorus with next labs to see if you still need sevelamer    Please call if you gain or lose more than 3lbs in a day or 5lbs in a week   Avoid all NSAIDs (ibuprofen, Motrin, Aleve, Advil, Naproxen   ) but it is ok to take tylenol    Follow up in 1 month with Dr Mabel Seip     Addendum:   Received lab work from 06/20  Creatinine 3 9 with GFR 17, potassium 5 4  Will increase Lasix to 40 mg p o  daily  If no improvement in swelling will change to torsemide  Low-potassium diet  Repeat BMP in 1 week as previously ordered  Called and relayed plan to patient/wife who agreed  HPI: Marj Valerio is a 71 y o  male who is here for JANEL follow up   Patient presents with his wife today  His main complaint is feeling depressed that he is not functionally back to baseline yet  He is frustrated that he can not do all the things he wants to do and that he does not feel like himself  He does have significant edema in his legs which is stable since starting the Lasix  He had some weeping in his legs last week but that did get better  He is urinating well and has no hematuria although he does still have foaming in his urine  His appetite is good  Does have occasional twitching movements  No chest pain or shortness of breath  Had significant nausea in the hospital which has now resolved      ROS:   A complete review of systems was done  Pertinent positives and negatives as noted in the HPI, otherwise the review of systems is negative      Allergies: Morphine and Vitamin k    Medications:   Current Outpatient Medications:     Accu-Chek Guide test strip, , Disp: , Rfl:     Accu-Chek Softclix Lancets lancets, Use as instructed daily to test sugar E11 9, Disp: 200 each, Rfl: 2    acetaminophen (TYLENOL) 325 mg tablet, Take 3 tablets (975 mg total) by mouth 3 (three) times a day as needed for mild pain, Disp: , Rfl: 0    amLODIPine (NORVASC) 10 mg tablet, Take 1 tablet (10 mg total) by mouth daily, Disp: 90 tablet, Rfl: 3    Blood Glucose Monitoring Suppl (Accu-Chek Guide) w/Device KIT, , Disp: , Rfl:     calcium carbonate-vitamin D (OSCAL-D) 500 mg-200 units per tablet, Take 1 tablet by mouth daily with breakfast, Disp: 30 tablet, Rfl: 0    doxazosin (CARDURA) 1 mg tablet, Take 1 tablet (1 mg total) by mouth daily at bedtime, Disp: 30 tablet, Rfl: 0    flecainide (TAMBOCOR) 100 mg tablet, TAKE 1 TABLET BY MOUTH  TWICE DAILY, Disp: 180 tablet, Rfl: 1    furosemide (LASIX) 20 mg tablet, Take 1 tablet (20 mg total) by mouth daily, Disp: 90 tablet, Rfl: 3    gabapentin (NEURONTIN) 300 mg capsule, Take 1 capsule (300 mg total) by mouth daily at bedtime, Disp: 90 capsule, Rfl: 3    methocarbamol (ROBAXIN) 750 mg tablet, Take 1 tablet (750 mg total) by mouth every 6 (six) hours as needed for muscle spasms, Disp: 30 tablet, Rfl: 0    metoprolol succinate (TOPROL-XL) 100 mg 24 hr tablet, TAKE 1 TABLET BY MOUTH  DAILY, Disp: 90 tablet, Rfl: 0    ondansetron (ZOFRAN) 4 mg tablet, Take 1 tablet (4 mg total) by mouth every 8 (eight) hours as needed for nausea or vomiting, Disp: 30 tablet, Rfl: 0    oxyCODONE (ROXICODONE) 5 immediate release tablet, Take 1 tablet (5 mg total) by mouth every 6 (six) hours as needed for moderate pain Max Daily Amount: 20 mg, Disp: 10 tablet, Rfl: 0    pantoprazole (PROTONIX) 40 mg tablet, Take 1 tablet (40 mg total) by mouth 2 (two) times a day before meals, Disp: 180 tablet, Rfl: 3    pravastatin (PRAVACHOL) 40 mg tablet, TAKE 1 TABLET BY MOUTH  DAILY, Disp: 90 tablet, Rfl: 1    predniSONE 20 mg tablet, 60mg daily for 4 weeks, then 40mg daily x 2 weeks starting June 30th, 2022  Then 30mg daily x 2 weeks, 20mg daily x 2 weeks, 10mg daily x 2 weeks, 5mg daily, Disp: 270 tablet, Rfl: 0    sevelamer (RENAGEL) 800 mg tablet, Take 1 tablet (800 mg total) by mouth 3 (three) times a day with meals, Disp: 90 tablet, Rfl: 0    [START ON 6/22/2022] sulfamethoxazole-trimethoprim (BACTRIM DS) 800-160 mg per tablet, Take 1 tablet by mouth 3 (three) times a week, Disp: 36 tablet, Rfl: 3    tamsulosin (FLOMAX) 0 4 mg, Take 1 capsule (0 4 mg total) by mouth daily with dinner, Disp: 90 capsule, Rfl: 1    warfarin (COUMADIN) 5 mg tablet, Take by mouth daily Take 2 5 mg (1/2 tablets) daily except Wednesday and Saturday take 5 mg daily  , Disp: , Rfl:     Past Medical History:   Diagnosis Date    Acute venous embolism and thrombosis of deep vessels of proximal lower extremity (HCC)     Last assessed: 5/18/15    Arthritis     Cellulitis     LE    Chronic kidney disease 3/2020    Acute Kidney Injury    CPAP (continuous positive airway pressure) dependence     Factor V Leiden ([address] )     Forgetfulness     Gross hematuria 5/17/2022    Headache(784 0) 3/2022    Never till cervical  spine surgery    Heart murmur 3/2020    Told when in hospital   [facility] (hard of hearing)     Hypoalbuminemia 5/11/2022    Paroxysmal atrial fibrillation (HCC)     Last assessed: 11/2/15    Sleep apnea      Past Surgical History:   Procedure Laterality Date    BACK SURGERY      Lower    COLON SURGERY      COLONOSCOPY      INCISION AND DRAINAGE POSTERIOR SPINE N/A 4/1/2022    Procedure: Posterior cervical evacuation of postoperative collection and debridement with placement of drains C3-T1;   Surgeon: Julia Addison MD;  [facility]: [facility]; Service: Neurosurgery    IR BIOPSY KIDNEY RANDOM  5/26/2022    IR TUNNELED DIALYSIS CATHETER PLACEMENT  5/23/2022    IR TUNNELED DIALYSIS CATHETER REMOVAL  6/2/2022    LA ARTHRODESIS ANT INTERBODY MIN DISCECTOMY, CERVICAL BELOW C2 N/A 3/11/2022    Procedure: Anterior cervical discectomy and fixation fusion C5/6 and C6/7; Posterior cervical decompression and instrumented fusion C3-T1; Surgeon: Trisha Faria MD;  [facility];  Service: Neurosurgery    SPINE SURGERY  3/11/2022    Cervical myelopathy/ cervical fusion     Family History   Problem Relation Age of Onset    Lung cancer Mother     Hearing loss Father     Heart attack Brother     Atrial fibrillation Brother     Emphysema Sister       reports that he has never smoked  He has never used smokeless tobacco  He reports previous alcohol use  He reports that he does not use drugs  Physical Exam:   Vitals:    06/21/22 1029   BP: 122/72   BP Location: Left arm   Patient Position: Sitting   Cuff Size: Large   Pulse: 56   SpO2: 97%   Weight: 110 kg (242 lb)   Height: 5' 11" (1 803 m)     Body mass index is 33 75 kg/m²  General: no acute distress , obese  Eyes: conjunctivae pink, anicteric sclerae  ENT: mucous membranes moist  Neck:  Symmetric  Chest: clear to auscultation bilaterally with no wheezes, rale or rhochi  CVS: regular rate and rhythm   Abdomen: soft, non-tender, non-distended  Extremities:  Significant below-the-knee edema bilaterally  Skin: no rash  Neuro: awake and alert   Psych: tearful, depressed       Lab Results:  Results for orders placed or performed in visit on 06/17/22   Protime-INR   Result Value Ref Range    INR 6 0 (>) 0 9 - 1 2    Prothrombin Time 58 7 (H) 9 1 - 12 0 sec             Invalid input(s): ALBUMIN      Portions of the record may have been created with voice recognition software   Occasional wrong word or "sound a like" substitutions may have occurred due to the inherent limitations of voice recognition software  Read the chart carefully and recognize, using context, where substitutions have occurred  If you have any questions, please contact the dictating provider

## 2022-06-21 NOTE — PATIENT INSTRUCTIONS
We will follow up yesterday's blood work  If kidney function still getting better will increase lasix   Repeat blood work in 1 week   Repeat phosphorus with next labs to see if you still need sevelamer   Please call if you gain or lose more than 3lbs in a day or 5lbs in a week  Avoid all NSAIDs (ibuprofen, Motrin, Aleve, Advil, Naproxen   ) but it is ok to take tylenol   Follow up in 1 month with Dr Brittnee Feliciano

## 2022-06-21 NOTE — TELEPHONE ENCOUNTER
Lab Result: INR 6 0, verified by repeated analysis   Date/Time Drawn: 06/20/2022  0933   Ordering Provider: Sanju Willoughby Name: Indu Mosquera       The following critical/stat result was read back to the lab as stated above and [employer] to the on-call provider

## 2022-06-21 NOTE — PROGRESS NOTES
Patient's current dose is 5mg W AND SAT, 2 5mg T TH F SUN MON  WILL HOLD X 2 DAYS AND RECHECK THURS 06/23/2022    PT DID NOTICE A HEMORRHAGE IN THE WHITE OF HIS EYE YESTERDAY

## 2022-06-22 ENCOUNTER — OFFICE VISIT (OUTPATIENT)
Dept: PODIATRY | Facility: CLINIC | Age: 72
End: 2022-06-22
Payer: COMMERCIAL

## 2022-06-22 VITALS
HEART RATE: 58 BPM | BODY MASS INDEX: 34.16 KG/M2 | SYSTOLIC BLOOD PRESSURE: 132 MMHG | DIASTOLIC BLOOD PRESSURE: 62 MMHG | WEIGHT: 244 LBS | HEIGHT: 71 IN

## 2022-06-22 DIAGNOSIS — B35.1 ONYCHOMYCOSIS: ICD-10-CM

## 2022-06-22 DIAGNOSIS — I73.9 PERIPHERAL VASCULAR DISEASE, UNSPECIFIED (HCC): Primary | ICD-10-CM

## 2022-06-22 DIAGNOSIS — I89.0 LYMPHEDEMA: ICD-10-CM

## 2022-06-22 PROCEDURE — 1036F TOBACCO NON-USER: CPT | Performed by: PODIATRIST

## 2022-06-22 PROCEDURE — 1160F RVW MEDS BY RX/DR IN RCRD: CPT | Performed by: PODIATRIST

## 2022-06-22 PROCEDURE — 99202 OFFICE O/P NEW SF 15 MIN: CPT | Performed by: PODIATRIST

## 2022-06-22 PROCEDURE — 11721 DEBRIDE NAIL 6 OR MORE: CPT | Performed by: PODIATRIST

## 2022-06-22 PROCEDURE — 3008F BODY MASS INDEX DOCD: CPT | Performed by: PODIATRIST

## 2022-06-22 PROCEDURE — 1111F DSCHRG MED/CURRENT MED MERGE: CPT | Performed by: PODIATRIST

## 2022-06-22 NOTE — PROGRESS NOTES
PATIENT:  Neville Sherman  1950    ASSESSMENT/PLAN:  1  Peripheral vascular disease, unspecified ([facility] [address] )     2  Lymphedema     3  Onychomycosis  Debridement         Orders Placed This Encounter   Procedures    Debridement        Patient was counseled and educated on the condition and the diagnosis  The diagnosis, treatment options and prognosis were discussed with the patient  Recommended periodic foot care  The patient was educated in proper foot wear  Discussed daily foot assessment and proper foot care  Educated management of LE edema with compression, diet, and elevation  The patient will follow up in 9 weeks for foot exam and care  PROCEDURE:  All mycotic toenails were reduced and debrided in length, width, and girth using a nail nipper and dremel  Patient tolerated procedure(s) well without complications  HPI:  Neville Sherman is a 71 y  o year old male seen for chief complaint of elongated toenails  He has difficulty managing them at home  The patient denied any acute pedal disorder, redness, acute swelling, or recent injury  He has chronic LE edema  He walks with a walker          PAST MEDICAL HISTORY:  Past Medical History:   Diagnosis Date    Acute venous embolism and thrombosis of deep vessels of proximal lower extremity (HCC)     Last assessed: 5/18/15    Arthritis     Cellulitis     LE    Chronic kidney disease 3/2020    Acute Kidney Injury    CPAP (continuous positive airway pressure) dependence     [facility]     Forgetfulness     Gross hematuria 5/17/2022    Headache(784 0) 3/2022    Never till cervical  spine surgery    Heart murmur 3/2020    Told when in hospital   [facility] (hard of hearing)     Hypoalbuminemia 5/11/2022    Paroxysmal atrial fibrillation (HCC)     Last assessed: 11/2/15    Sleep apnea        PAST SURGICAL HISTORY:  Past Surgical History:   Procedure Laterality Date    BACK SURGERY      Lower    COLON SURGERY      COLONOSCOPY      INCISION AND DRAINAGE POSTERIOR SPINE N/A 4/1/2022    Procedure: Posterior cervical evacuation of postoperative collection and debridement with placement of drains C3-T1; Surgeon: Reena Tamez MD;  [facility];  Service: Neurosurgery    IR BIOPSY KIDNEY RANDOM  5/26/2022    IR TUNNELED DIALYSIS CATHETER PLACEMENT  5/23/2022    IR TUNNELED DIALYSIS CATHETER REMOVAL  6/2/2022    AL ARTHRODESIS ANT INTERBODY MIN DISCECTOMY, CERVICAL BELOW C2 N/A 3/11/2022    Procedure: Anterior cervical discectomy and fixation fusion C5/6 and C6/7; Posterior cervical decompression and instrumented fusion C3-T1;   Surgeon: Reena Tamez MD;  Location[facility];  Service: Neurosurgery    SPINE SURGERY  3/11/2022    Cervical myelopathy/ cervical fusion        ALLERGIES:  Morphine and Vitamin k    MEDICATIONS:  Current Outpatient Medications   Medication Sig Dispense Refill    Accu-Chek Guide test strip       Accu-Chek Softclix Lancets lancets Use as instructed daily to test sugar E11 9 200 each 2    acetaminophen (TYLENOL) 325 mg tablet Take 3 tablets (975 mg total) by mouth 3 (three) times a day as needed for mild pain  0    amLODIPine (NORVASC) 10 mg tablet Take 1 tablet (10 mg total) by mouth daily 90 tablet 3    Blood Glucose Monitoring Suppl (Accu-Chek Guide) w/Device KIT       calcium carbonate-vitamin D (OSCAL-D) 500 mg-200 units per tablet Take 1 tablet by mouth daily with breakfast 30 tablet 0    doxazosin (CARDURA) 1 mg tablet Take 1 tablet (1 mg total) by mouth daily at bedtime 30 tablet 0    flecainide (TAMBOCOR) 100 mg tablet TAKE 1 TABLET BY MOUTH  TWICE DAILY 180 tablet 1    furosemide (LASIX) 20 mg tablet Take 2 tablets (40 mg total) by mouth daily 90 tablet 3    gabapentin (NEURONTIN) 300 mg capsule Take 1 capsule (300 mg total) by mouth daily at bedtime 90 capsule 3    methocarbamol (ROBAXIN) 750 mg tablet Take 1 tablet (750 mg total) by mouth every 6 (six) hours as needed for muscle spasms 30 tablet 0    metoprolol succinate (TOPROL-XL) 100 mg 24 hr tablet TAKE 1 TABLET BY MOUTH  DAILY 90 tablet 0    ondansetron (ZOFRAN) 4 mg tablet Take 1 tablet (4 mg total) by mouth every 8 (eight) hours as needed for nausea or vomiting 30 tablet 0    oxyCODONE (ROXICODONE) 5 immediate release tablet Take 1 tablet (5 mg total) by mouth every 6 (six) hours as needed for moderate pain Max Daily Amount: 20 mg 10 tablet 0    pantoprazole (PROTONIX) 40 mg tablet Take 1 tablet (40 mg total) by mouth 2 (two) times a day before meals 180 tablet 3    pravastatin (PRAVACHOL) 40 mg tablet TAKE 1 TABLET BY MOUTH  DAILY 90 tablet 1    predniSONE 20 mg tablet 60mg daily for 4 weeks, then 40mg daily x 2 weeks starting June 30th, 2022  Then 30mg daily x 2 weeks, 20mg daily x 2 weeks, 10mg daily x 2 weeks, 5mg daily 270 tablet 0    sevelamer (RENAGEL) 800 mg tablet Take 1 tablet (800 mg total) by mouth 3 (three) times a day with meals 90 tablet 0    sulfamethoxazole-trimethoprim (BACTRIM DS) 800-160 mg per tablet Take 1 tablet by mouth 3 (three) times a week 36 tablet 3    tamsulosin (FLOMAX) 0 4 mg Take 1 capsule (0 4 mg total) by mouth daily with dinner 90 capsule 1    warfarin (COUMADIN) 5 mg tablet Take by mouth daily Take 2 5 mg (1/2 tablets) daily except Wednesday and Saturday take 5 mg daily  No current facility-administered medications for this visit         SOCIAL HISTORY:  Social History     Socioeconomic History    Marital status: /Civil Union     Spouse name: Cosimo Crigler Number of children: 3    Years of education: None    Highest education level: None   Occupational History    Occupation: Retired   Tobacco Use    Smoking status: Never Smoker    Smokeless tobacco: Never Used   Vaping Use    Vaping Use: Never used   Substance and Sexual Activity    Alcohol use: Not Currently     Alcohol/week: 0 0 standard drinks    Drug use: No    Sexual activity: Not Currently     Partners: Female   Other Topics Concern    None   Social History Narrative    Caffeine use: 2 cups coffee a day     Social Determinants of Health     Financial Resource Strain: Not on file   Food Insecurity: No Food Insecurity    Worried About Running Out of Food in the Last Year: Never true    Kacy of Food in the Last Year: Never true   Transportation Needs: No Transportation Needs    Lack of Transportation (Medical): No    Lack of Transportation (Non-Medical): No   Physical Activity: Not on file   Stress: Not on file   Social Connections: Not on file   Intimate Partner Violence: Not on file   Housing Stability: Low Risk     Unable to Pay for Housing in the Last Year: No    Number of Places Lived in the Last Year: 1    Unstable Housing in the Last Year: No        REVIEW OF SYSTEMS:  GENERAL: No fever or chills  HEART: No chest pain, or palpitation  RESPIRATORY:  No acute SOB or cough  GI: No Nausea, vomit or diarrhea  NEUROLOGIC: No syncope or acute weakness    PHYSICAL EXAM:    /62   Pulse 58   Ht 5' 11" (1 803 m) Comment: verbal  Wt 111 kg (244 lb)   BMI 34 03 kg/m²     VASCULAR EXAM  Dorsalis pedis  +1, Posterior tibial artery  absent  The patient has class findings with skin atrophy, decreased digital hair, and nail dystrophy  There is +2 lower extremity edema bilaterally  NEUROLOGIC EXAM  Sensation is intact to light touch  Sensation is intact to 10gm monofilament  No focal neurologic deficit  DERMATOLOGIC EXAM:   No ulcer or cellulitis noted  The patient has hypertrophic toenails X 6 with discoloration, onycholysis, and subungal debris  No notable skin lesion  MUSCULOSKELETAL EXAM:   No acute joint pain, edema, or redness  No acute musculoskeletal problem

## 2022-06-23 ENCOUNTER — TELEPHONE (OUTPATIENT)
Dept: CARDIOLOGY CLINIC | Facility: CLINIC | Age: 72
End: 2022-06-23

## 2022-06-23 ENCOUNTER — EVALUATION (OUTPATIENT)
Dept: PHYSICAL THERAPY | Facility: CLINIC | Age: 72
End: 2022-06-23
Payer: COMMERCIAL

## 2022-06-23 DIAGNOSIS — Z48.89 ENCOUNTER FOR POSTOPERATIVE CARE RELATED TO SURGICAL JOINT FUSION: Primary | ICD-10-CM

## 2022-06-23 DIAGNOSIS — Z98.1 ENCOUNTER FOR POSTOPERATIVE CARE RELATED TO SURGICAL JOINT FUSION: Primary | ICD-10-CM

## 2022-06-23 DIAGNOSIS — M54.2 NECK PAIN: ICD-10-CM

## 2022-06-23 PROCEDURE — 97110 THERAPEUTIC EXERCISES: CPT | Performed by: PHYSICAL THERAPIST

## 2022-06-23 PROCEDURE — 97162 PT EVAL MOD COMPLEX 30 MIN: CPT | Performed by: PHYSICAL THERAPIST

## 2022-06-23 NOTE — PROGRESS NOTES
PT Evaluation     Today's date: 2022  Patient name: Alvaro Rai  : 1950  MRN: [id number]  Referring provider: Galdino Hall  Dx:   Encounter Diagnosis     ICD-10-CM    1  Encounter for postoperative care related to surgical joint fusion  Z48 89 Ambulatory referral to Physical Therapy    Z98 1    2  Neck pain  M54 2                   Assessment  Assessment details: Alvaro Rai is a 71 y o  male presenting to outpatient physical therapy with chief complaints of balance deficits following neck surgery  Patient describes onset of (R) sided numbness and tingling following 2 falls in early March  Patient displays with abnormal gait, restricted cervical AROM (WNL given fusion surgery), (B) UE strength deficits, balance impairments, and functional restrictions  Patient's symptoms are multifactorial in nature with a primary movement diagnosis of poor motor control resulting in pathoanatomical signs and symptoms consistent with Encounter for postoperative care related to surgical joint fusion  (primary encounter diagnosis), Neck pain resulting in limitations in his ability to ambulate safely without AD and perform daily housework and yard work  No further referral appears necessary at this time based upon examination results  Please contact me if you have any questions ([phone])  Thank you for the opportunity to share in the care of this patient  Impairments: abnormal or restricted ROM, activity intolerance, impaired physical strength, lacks appropriate home exercise program and pain with function    Symptom irritability: moderateUnderstanding of Dx/Px/POC: good   Prognosis: good  Prognosis details: Positive prognostic indicators include positive attitude toward recovery, motivated to improve, high self-efficacy, good understanding of condition, realistic expectations  Goals  STG to be met in 3 weeks:  - Increase (B) UE strength by 1/2 MMT grade    - Increased SL Balance to 5 sec    - I with HEP    - Patient will be able to transition to walking with SPC  LTG to be met in 6 weeks:  - Increase (B) UE strength to 4+/5 all planes  - Increase SL Balance to 10 sec  - I with updated HEP   - Patient will be able to return to walking without AD  Plan  Plan details: Prognosis above is given PT services 2x/week tapering to 1x/week over the next 6 weeks and home program adherence  Patient would benefit from: skilled physical therapy  Planned modality interventions: cryotherapy and thermotherapy: hydrocollator packs  Planned therapy interventions: activity modification, joint mobilization, manual therapy, neuromuscular re-education, body mechanics training, patient education, postural training, strengthening, stretching, therapeutic activities, therapeutic exercise, functional ROM exercises and home exercise program  Plan of Care beginning date: 2022  Plan of Care expiration date: 2022  Treatment plan discussed with: patient        Subjective Evaluation    History of Present Illness  Date of surgery: 3/11/2022  Mechanism of injury: surgery  Mechanism of injury: At Evaluation (2022): Patient reports having (L) sided arm and leg numbness and weakness in March - went to ER - diagnosed with cervical myelopathy - cervical fusion was performed on 3/11/22  He reports having an infection (MRSA) - had another surgery on 3/31/22  He reports having some complications with kidney function  At his last visit with his surgeon PT was recommended        Red Flag Screen:  Patient denies recent fever, headache, dizziness, nausea, vomiting, vision changes, weakness, tingling, or numbness      Greatest concern: difficulty balancing   Quality of life: good    Pain  Current pain ratin  At best pain ratin  At worst pain rating: 3  Location: Cervical Spine   Quality: dull ache    Social Support  Steps to enter house: yes  Stairs in house: yes   Lives in: multiple-level home  Lives with: spouse    Employment status: not working    Diagnostic Tests    FCE comments: Post op imaging normal Patient Goals  Patient goal: Patient Specific Functional Scale: return to walking without AD 0/10, return to walking on the beach 0/10, perform housework and yard work with minimal to no assistance 0/10; Total 0/30        Objective     Static Posture   General Observations  Symmetrical weight bearing  Postural Observations  Seated posture: fair  Standing posture: fair        Neurological Testing     Sensation   Cervical/Thoracic   Left   Intact: light touch    Right   Intact: light touch    Active Range of Motion   Cervical/Thoracic Spine       Cervical    Flexion: 40 degrees   Extension: 15 degrees      Left lateral flexion: 10 degrees      Right lateral flexion: 10 degrees      Left rotation: 45 degrees  Right rotation: 40 degrees             Strength/Myotome Testing     Left Shoulder     Planes of Motion   Flexion: 4   Extension: 4+   Abduction: 4   External rotation at 0°: 4-   Internal rotation at 0°: 4+     Right Shoulder     Planes of Motion   Flexion: 4   Extension: 4+   Abduction: 4   External rotation at 0°: 4   Internal rotation at 0°: 4+     Left Elbow   Flexion: 4+  Extension: 4+    Right Elbow   Flexion: 4+  Extension: 4+    Left Wrist/Hand   Wrist extension: 4+  Wrist flexion: 4+  Thumb extension: 4+    Right Wrist/Hand   Wrist extension: 4+  Wrist flexion: 4+  Thumb extension: 4+    Tests     Left Shoulder   Negative ULTT1, ULTT3 and ULTT4  Right Shoulder   Negative ULTT1, ULTT3 and ULTT4  Ambulation     Observational Gait   Gait: antalgic   Decreased walking speed, left stance time, right stance time, left step length and right step length     Left foot contact pattern: foot flat  Right foot contact pattern: foot flat  Left arm swing: high guard  Right arm swing: high guard  Base of support: increased    Additional Observational Gait Details  Very off balanced without using FW Walker    Functional Assessment        Comments  TU 48 sec  Romberg Balance: EO: 30 sec no sway; EC: 30 sec min sway  Sharpened Romberg Balance: (R) Foot FWD 5 sec mod sway; (L) Foot FWD 5 sec mod sway   SL Balance:    - EO: (R) unable; (L) unable             Daily Treatment Diary     DX: s/p Cervical Fusion C3-T1  EPOC:  Follow Up with Referring Provider: AVINASH  Precautions: NA  CO-MORBIDITIES: A-fib, Hx of DVT  HEP ACCESS CODE: [id number]       Stage: chronic   Stability of Symptoms:  At Evaluation: stable - unchanged  Global Rating of Change:   Symptom Irritability Level: low  Primary Movement Diagnosis: poor motor control   Patient Specific Functional Scale:   22: return to walking without AD 0/10, return to walking on the beach 0/10, perform housework and yard work with minimal to no assistance 0/10;  Total 0/30  FOTO Prediction: 65 by visit 12  FOTO Progress: 61 at evaluation   Greatest Concern: difficulty balancing   Current Activity Plan: added to HEP ()  Current Educational Needs: progressions      Treatment Diary          Manual Therapy                                                                        Therapeutic Exercise  HEP         UBE                    Cervical AROM Matrix          Cervical Isometrics                    Standing Sh' Flex          Standing Sh' Scaption          Standing Sh' ABD                                        Neuromuscular Reeducation          TB Sh' Row          TB Sh' Ext          TB Triceps                     Romberg Balance on Foam          Romberg with Reach Matrix                    Sidestepping on Foam                                        Therapeutic Activity                              Gait Training                                        Modalities

## 2022-06-24 ENCOUNTER — ANTICOAG VISIT (OUTPATIENT)
Dept: FAMILY MEDICINE CLINIC | Facility: CLINIC | Age: 72
End: 2022-06-24

## 2022-06-24 ENCOUNTER — OFFICE VISIT (OUTPATIENT)
Dept: PHYSICAL THERAPY | Facility: CLINIC | Age: 72
End: 2022-06-24
Payer: COMMERCIAL

## 2022-06-24 DIAGNOSIS — Z48.89 ENCOUNTER FOR POSTOPERATIVE CARE RELATED TO SURGICAL JOINT FUSION: Primary | ICD-10-CM

## 2022-06-24 DIAGNOSIS — M54.2 NECK PAIN: ICD-10-CM

## 2022-06-24 DIAGNOSIS — Z98.1 ENCOUNTER FOR POSTOPERATIVE CARE RELATED TO SURGICAL JOINT FUSION: Primary | ICD-10-CM

## 2022-06-24 LAB
INR PPP: 2.2 (ref 0.9–1.2)
PROTHROMBIN TIME: 22 SEC (ref 9.1–12)

## 2022-06-24 PROCEDURE — 97110 THERAPEUTIC EXERCISES: CPT | Performed by: PHYSICAL THERAPIST

## 2022-06-24 PROCEDURE — 97112 NEUROMUSCULAR REEDUCATION: CPT | Performed by: PHYSICAL THERAPIST

## 2022-06-24 NOTE — PROGRESS NOTES
Daily Note     Today's date: 2022  Patient name: Mukund Larkin  : 1950  MRN: [id number]  Referring provider: Ceola Claude  Dx:   Encounter Diagnosis     ICD-10-CM    1  Encounter for postoperative care related to surgical joint fusion  Z48 89     Z98 1    2  Neck pain  M54 2                   Subjective: Patient reports good tolerance to his IE and initial HEP  He reports his neck is feeling pretty good - continues to struggle with balance  Objective: See treatment diary below      Assessment:   Stage: chronic   Stability of Symptoms:  At Evaluation: stable - unchanged  Global Rating of Change:   Symptom Irritability Level: low  Primary Movement Diagnosis: poor motor control   Patient Specific Functional Scale:   22: return to walking without AD 0/10, return to walking on the beach 0/10, perform housework and yard work with minimal to no assistance 0/10; Total 030  FOTO Prediction: 65 by visit 12  FOTO Progress: 59 at evaluation   Greatest Concern: difficulty balancing   Current Activity Plan: added to HEP ()  Current Educational Needs: progressions    Patient had good tolerance to previously issued HEP  He had good tolerance to progressions with cervical and shoulder strengthening  He was challenged with balance exercises  He noted no increased pain post treatment  Skilled PT continues to be required to guide progression of cervical and UE strength as well as balance to allow him to return to walking without AD and performing housework/ yard work          Plan: Continue with POC - monitor tolerance to treatment - progress as able NV     Daily Treatment Diary     DX: s/p Cervical Fusion C3-T1  EPOC: 22  Follow Up with Referring Provider: AVINASH  Precautions: NA  CO-MORBIDITIES: A-fib, Hx of DVT  HEP ACCESS CODE: [id number]       Treatment Diary          Manual Therapy                                                                        Therapeutic Exercise  HEP UBE  3'/3'                  Cervical AROM Matrix 6/23 10x ea        Cervical Isometrics  4 Way  5"x10 ea                  Standing Sh' Flex  2# 10x        Standing Sh' Scaption  2# 10x        Standing Sh' ABD  2# 10x                                      Neuromuscular Reeducation          TB Sh' Row  GTB  20x        TB Sh' Ext  GTB  20x        TB Triceps                     Romberg Balance on Foam  30"x2        Romberg with Reach Matrix  On Foam  10x ea                  Sidestepping on Foam                                        Therapeutic Activity                              Gait Training                                        Modalities

## 2022-06-27 ENCOUNTER — PATIENT OUTREACH (OUTPATIENT)
Dept: FAMILY MEDICINE CLINIC | Facility: CLINIC | Age: 72
End: 2022-06-27

## 2022-06-27 NOTE — PROGRESS NOTES
Outpatient Care Management Note:    Call received from [facility] wife, Erling Moritz  She states that VNA services have ended  Leonel Flores is now starting outpatient therapy  He had his blood work drawn this morning  CM questioned if he also completed the phosphorus level  Erling Moritz was aware this needed to be done  Leonel Flores is not consistently checking blood sugars  Tal Moritz states that he "grazes" all day  He often forgets to check it fasting in the morning  CM instructed fatemeh to record the sugar and zachary it non fasting or after eating  She feels they were averaging around 120  Leonel Flores is also not consistently checking daily weights  CM reviewed that Leonel Flores is on lasix and has issues with swelling and draining legs  We reviewed that he should call nephrology if his weight increases by 3 lb in 1 day or 5 lb in 1 week  Tal Moritz is struggling with meal planning  CM encouraged her to speak with Dr Brian Duong about a dietician referral, so she can learn meal planning of low phosphorus, renal  and diabetic diet  Leonel Flores was not home currently  She will encourage him to call CM back to complete initial assessment

## 2022-06-27 NOTE — PROGRESS NOTES
Outpatient Care Management Note:    Voice mail message left for Elisabet Wilson and his wife, with my contact information, requesting a call back

## 2022-06-28 ENCOUNTER — TELEPHONE (OUTPATIENT)
Dept: NEPHROLOGY | Facility: CLINIC | Age: 72
End: 2022-06-28

## 2022-06-28 DIAGNOSIS — R60.9 EDEMA, UNSPECIFIED TYPE: Primary | ICD-10-CM

## 2022-06-28 LAB
ALBUMIN SERPL-MCNC: 3.4 G/DL (ref 3.7–4.7)
ALBUMIN/GLOB SERPL: 1.8 {RATIO} (ref 1.2–2.2)
ALP SERPL-CCNC: 37 IU/L (ref 44–121)
ALT SERPL-CCNC: 12 IU/L (ref 0–44)
APPEARANCE UR: CLEAR
AST SERPL-CCNC: 8 IU/L (ref 0–40)
BACTERIA URNS QL MICRO: ABNORMAL
BASOPHILS # BLD AUTO: 0 X10E3/UL (ref 0–0.2)
BASOPHILS NFR BLD AUTO: 0 %
BILIRUB SERPL-MCNC: <0.2 MG/DL (ref 0–1.2)
BILIRUB UR QL STRIP: NEGATIVE
BUN SERPL-MCNC: 92 MG/DL (ref 8–27)
BUN/CREAT SERPL: 21 (ref 10–24)
CALCIUM SERPL-MCNC: 8.1 MG/DL (ref 8.6–10.2)
CASTS URNS QL MICRO: ABNORMAL /LPF
CHLORIDE SERPL-SCNC: 111 MMOL/L (ref 96–106)
CO2 SERPL-SCNC: 18 MMOL/L (ref 20–29)
COLOR UR: ABNORMAL
CREAT SERPL-MCNC: 4.44 MG/DL (ref 0.76–1.27)
CREAT UR-MCNC: 46.8 MG/DL
EGFR: 13 ML/MIN/1.73
EOSINOPHIL # BLD AUTO: 0.1 X10E3/UL (ref 0–0.4)
EOSINOPHIL NFR BLD AUTO: 1 %
EPI CELLS #/AREA URNS HPF: ABNORMAL /HPF (ref 0–10)
ERYTHROCYTE [DISTWIDTH] IN BLOOD BY AUTOMATED COUNT: 14 % (ref 11.6–15.4)
GLOBULIN SER-MCNC: 1.9 G/DL (ref 1.5–4.5)
GLUCOSE SERPL-MCNC: 108 MG/DL (ref 65–99)
GLUCOSE UR QL: NEGATIVE
HCT VFR BLD AUTO: 21.9 % (ref 37.5–51)
HGB BLD-MCNC: 7.1 G/DL (ref 13–17.7)
HGB UR QL STRIP: ABNORMAL
IMM GRANULOCYTES # BLD: 0 X10E3/UL (ref 0–0.1)
IMM GRANULOCYTES NFR BLD: 0 %
KETONES UR QL STRIP: NEGATIVE
LEUKOCYTE ESTERASE UR QL STRIP: NEGATIVE
LYMPHOCYTES # BLD AUTO: 1 X10E3/UL (ref 0.7–3.1)
LYMPHOCYTES NFR BLD AUTO: 9 %
MCH RBC QN AUTO: 29.2 PG (ref 26.6–33)
MCHC RBC AUTO-ENTMCNC: 32.4 G/DL (ref 31.5–35.7)
MCV RBC AUTO: 90 FL (ref 79–97)
MICRO URNS: ABNORMAL
MONOCYTES # BLD AUTO: 0.5 X10E3/UL (ref 0.1–0.9)
MONOCYTES NFR BLD AUTO: 5 %
NEUTROPHILS # BLD AUTO: 8.5 X10E3/UL (ref 1.4–7)
NEUTROPHILS NFR BLD AUTO: 85 %
NITRITE UR QL STRIP: NEGATIVE
PH UR STRIP: 5.5 [PH] (ref 5–7.5)
PLATELET # BLD AUTO: 161 X10E3/UL (ref 150–450)
POTASSIUM SERPL-SCNC: 5.1 MMOL/L (ref 3.5–5.2)
PROT SERPL-MCNC: 5.3 G/DL (ref 6–8.5)
PROT UR QL STRIP: ABNORMAL
PROT UR-MCNC: 108.5 MG/DL
PROT/CREAT UR: 2318 MG/G CREAT (ref 0–200)
RBC # BLD AUTO: 2.43 X10E6/UL (ref 4.14–5.8)
RBC #/AREA URNS HPF: >30 /HPF (ref 0–2)
SODIUM SERPL-SCNC: 144 MMOL/L (ref 134–144)
SP GR UR: 1.01 (ref 1–1.03)
UROBILINOGEN UR STRIP-ACNC: 0.2 MG/DL (ref 0.2–1)
WBC # BLD AUTO: 10.1 X10E3/UL (ref 3.4–10.8)
WBC #/AREA URNS HPF: ABNORMAL /HPF (ref 0–5)

## 2022-06-28 RX ORDER — TORSEMIDE 20 MG/1
20 TABLET ORAL DAILY
Qty: 90 TABLET | Refills: 1 | Status: SHIPPED | OUTPATIENT
Start: 2022-06-28

## 2022-06-28 NOTE — TELEPHONE ENCOUNTER
Patient stated that he is currently taking 40 mg of furosemide a day  States that it is not really helping he is still have a good amount of swelling in the legs and ankles  Patient would like to know what he should be eating states that one diet contradicts the other diet  Patient is having a hard time following a low potassium low sodium diet  Patient stated that he would like to use the [facility] in [facility]

## 2022-06-28 NOTE — TELEPHONE ENCOUNTER
----- Message from Valerie Jolly DO sent at 6/28/2022  8:54 AM EDT -----  Proteinuria has improved but sCr and BUN have risen  sCr now 4 44 with BUN 92  Please inquire about how patient is feeling, specifically whether he is having nausea, vomiting, poor appetite, metallic taste in his mouth, swelling, diarrhea, tremor  His hemoglobin and bicarbonate are also low  I am concerned he may need dialysis if he is having any of the above symptoms

## 2022-06-28 NOTE — TELEPHONE ENCOUNTER
Spoke with patient and advised: Please let the patient know that I will change his medication from furosemide to torsemide  He should generally follow a low sodium diet and avoid foods high in potassium  Many foods contain potassium  This is not to say he has to avoid all foods but should avoid eating high potassium containing foods frequently  Will attempt torsemide and send Rx to pharmacy in hopes that it helps his swelling  If it does not, he may need hospital admission for possible dialysis initiation  He should have repeat BMP in 2 days once he has started torsemide  He should monitor daily weight and BP as well  Patient will go for blood work on Friday  Labs were faxed to [address] in [address] [place] [address]

## 2022-06-28 NOTE — TELEPHONE ENCOUNTER
Pt called the office back, I went over his lab results with him per Dr Loc Patrick  Pt is currently having b/l leg and feet swelling pt does have drainage coming out of his legs, pt also c/o of tiredness  Pt denies having a poor appetite, nausea, vomiting, metallic taste in his mouth  I informed him I will inform Dr Loc Patrick know of current symptoms and we will contact him back for further recommendations  Pt verbalized understanding and stated the lab did not complete the phos testing so he will go to tomorrow  ROBERT

## 2022-06-28 NOTE — PROGRESS NOTES
06/28/22 1051   Hello, [Guardians Name / Babs Ahumada, this is [Caller Jeri Gauthier from [facility], and our clinical care team wanted to check on you / your child after your recent visit to the hospital  It will only take 3-5 minutes  Is this a good time? Discharge Call Type/ Specific Diagnosis: ARC 90 Day Call   ARC 90 Day Discharge Call   Assessment Source 2   Call Complete for 90 Days call back? Complete   Call Complete   Hi, This is ________ from [facility]  This is just a courtesy call, and there is no need to call us back  Have a great day     (Called by [place])

## 2022-06-30 ENCOUNTER — TELEPHONE (OUTPATIENT)
Dept: NEPHROLOGY | Facility: CLINIC | Age: 72
End: 2022-06-30

## 2022-06-30 ENCOUNTER — OFFICE VISIT (OUTPATIENT)
Dept: PHYSICAL THERAPY | Facility: CLINIC | Age: 72
End: 2022-06-30
Payer: COMMERCIAL

## 2022-06-30 DIAGNOSIS — Z98.1 ENCOUNTER FOR POSTOPERATIVE CARE RELATED TO SURGICAL JOINT FUSION: Primary | ICD-10-CM

## 2022-06-30 DIAGNOSIS — M54.2 NECK PAIN: ICD-10-CM

## 2022-06-30 DIAGNOSIS — Z48.89 ENCOUNTER FOR POSTOPERATIVE CARE RELATED TO SURGICAL JOINT FUSION: Primary | ICD-10-CM

## 2022-06-30 PROCEDURE — 97112 NEUROMUSCULAR REEDUCATION: CPT | Performed by: PHYSICAL THERAPIST

## 2022-06-30 PROCEDURE — 97110 THERAPEUTIC EXERCISES: CPT | Performed by: PHYSICAL THERAPIST

## 2022-06-30 NOTE — TELEPHONE ENCOUNTER
I spoke with pt's wife reminding her Vivian Iraheta will need to obtain labs prior to his visit with Dr Karen Farrell, she stated he is aware of the standing orders that have been placed and will complete at [facility]

## 2022-06-30 NOTE — PROGRESS NOTES
Daily Note     Today's date: 2022  Patient name: Asiya George  : 1950  MRN: [id number]  Referring provider: Nakia Prince  Dx:   Encounter Diagnosis     ICD-10-CM    1  Encounter for postoperative care related to surgical joint fusion  Z48 89     Z98 1    2  Neck pain  M54 2                   Subjective: Patient reports good tolerance to his LV  He reports he has been very active today with mowing grass and doing some housework  He reports he was able to drive today without any issues  Objective: See treatment diary below      Assessment:   Stage: chronic   Stability of Symptoms:  At Evaluation: stable - unchanged  Global Rating of Change:   Symptom Irritability Level: low  Primary Movement Diagnosis: poor motor control   Patient Specific Functional Scale:   22: return to walking without AD 0/10, return to walking on the beach 0/10, perform housework and yard work with minimal to no assistance 0/10; Total 0  FOTO Prediction: 65 by visit 12  FOTO Progress: 59 at evaluation   Greatest Concern: difficulty balancing   Current Activity Plan: added to HEP ()  Current Educational Needs: progressions    Patient continues to respond well to exercise treatment - cervical mobility remains significantly improved  He demonstrated improved form with balance - able to reach further outside of his base of support and control himself better  He is making good overall progress with PT  Skilled PT continues to be required to guide progression of cervical and UE strength as well as balance to allow him to return to walking without AD and performing housework/ yard work          Plan: Continue with POC - monitor tolerance to treatment - progress as able NV     Daily Treatment Diary     DX: s/p Cervical Fusion C3-T1  EPOC: 22  Follow Up with Referring Provider: AVINASH  Precautions: NA  CO-MORBIDITIES: A-fib, Hx of DVT  HEP ACCESS CODE: [id number]       Treatment Diary         Manual Therapy                                                                        Therapeutic Exercise  HEP         UBE  3'/3' 3'/3'                 Cervical AROM Matrix 6/23 10x ea 10x ea       Cervical Isometrics  4 Way  5"x10 ea 4 Way  5"x10 ea                 Standing Sh' Flex  2# 10x 2# 10x       Standing Sh' Scaption  2# 10x 2# 10x       Standing Sh' ABD  2# 10x 2# 10x                                     Neuromuscular Reeducation          TB Sh' Row  GTB  20x GTB  20x       TB Sh' Ext  GTB  20x GTB  20x       TB Triceps                     Romberg Balance on Foam  30"x2 30"x2       Romberg with Reach Matrix  On Foam  10x ea On Foam  10x ea                 Sidestepping on Foam   6 Feet  3 laps                                     Therapeutic Activity                              Gait Training                                        Modalities

## 2022-06-30 NOTE — TELEPHONE ENCOUNTER
Patient wife called and was asking about the dosages of the prednisone and how it is going to be tapered down  I informed her of the followinmg daily for 4 weeks, then 40mg daily x 2 weeks starting 2022  Then 30mg daily x 2 weeks, 20mg daily x 2 weeks, 10mg daily x 2 weeks, 5mg daily  Natty Joshi verbally understood and had no further questions foe me

## 2022-07-02 DIAGNOSIS — R73.01 IFG (IMPAIRED FASTING GLUCOSE): Primary | ICD-10-CM

## 2022-07-04 RX ORDER — BLOOD SUGAR DIAGNOSTIC
STRIP MISCELLANEOUS
Qty: 50 STRIP | Refills: 2 | Status: SHIPPED | OUTPATIENT
Start: 2022-07-04

## 2022-07-05 ENCOUNTER — PATIENT OUTREACH (OUTPATIENT)
Dept: FAMILY MEDICINE CLINIC | Facility: CLINIC | Age: 72
End: 2022-07-05

## 2022-07-05 DIAGNOSIS — N17.9 AKI (ACUTE KIDNEY INJURY) (HCC): ICD-10-CM

## 2022-07-05 DIAGNOSIS — Z98.1 STATUS POST CERVICAL SPINAL FUSION: ICD-10-CM

## 2022-07-05 NOTE — PROGRESS NOTES
Outpatient Care Management Note:    Voice mail message left for Colletta Able and his wife, Sukumar Duffy, with my contact information, requesting a call back

## 2022-07-05 NOTE — PROGRESS NOTES
Outpatient Care Management Note:    Voice mail message received from Mortimer Pointer returning my call  Care manager called Mortimer Pointer back  He states that his weight has been stable at 230 lbs  We reviewed that he should call nephrology if he gains 3 lb in 1 day or 5 lb in 1 week  He is also aware to call nephrology with any symptoms of nausea, vomiting, metallic taste, diarrhea, swelling or tremors  Mortimer Pointer continues with swelling of his lower legs  He feels the torsemide is helping  Mortimer Pointer is monitoring his blood sugars  He states that it is averaging between  in the morning and 150-160 in the afternoon  CM explained how a blood sugar will rise 2-3 hours after eating  He will track his sugars and will call DR Dario Parson if they are trending higher  Mortimer Pointer is on a prednisone taper  Mortimer Pointer is attending physical therapy  He did note that he is always fatigued  He is most concerned about his mobility and balance  He is attending physical therapy  We completed initial assessment  Mortimer Pointer manages his own medications  He does not use med boxes but states his system works for him  CM reviewed the benefits of med boxes when taking multiple medications  I encouraged him to consider using a med box  Mortimer Pointer has my contact information  He will call with any questions  Care manager mailed [facility] Potassium content of foods list and limiting potassium per [employer]

## 2022-07-06 ENCOUNTER — TELEPHONE (OUTPATIENT)
Dept: FAMILY MEDICINE CLINIC | Facility: CLINIC | Age: 72
End: 2022-07-06

## 2022-07-06 ENCOUNTER — TELEPHONE (OUTPATIENT)
Dept: NEPHROLOGY | Facility: CLINIC | Age: 72
End: 2022-07-06

## 2022-07-06 ENCOUNTER — ANTICOAG VISIT (OUTPATIENT)
Dept: FAMILY MEDICINE CLINIC | Facility: CLINIC | Age: 72
End: 2022-07-06

## 2022-07-06 DIAGNOSIS — N17.9 AKI (ACUTE KIDNEY INJURY) (HCC): Primary | ICD-10-CM

## 2022-07-06 DIAGNOSIS — I48.0 PAROXYSMAL A-FIB (HCC): ICD-10-CM

## 2022-07-06 DIAGNOSIS — Z79.01 ANTICOAGULATED ON COUMADIN: ICD-10-CM

## 2022-07-06 DIAGNOSIS — D68.51 FACTOR V LEIDEN MUTATION (HCC): ICD-10-CM

## 2022-07-06 RX ORDER — SEVELAMER HYDROCHLORIDE 800 MG/1
TABLET, FILM COATED ORAL
Qty: 90 TABLET | Refills: 0 | Status: SHIPPED | OUTPATIENT
Start: 2022-07-06 | End: 2022-07-08 | Stop reason: ALTCHOICE

## 2022-07-06 NOTE — TELEPHONE ENCOUNTER
Patient's wife Carla Kahn called the office with multiple questions she would like to know if Severa Muff will be on Sevelamer 800 mg for long term? If so, she would like a 90 day supply of this medication sent to [facility]  She also would like to verify if patient still needs to obtain weekly labs until upcoming appointment on 07/19/22 and does he need to complete a phos/mag test? Also, pt completed a series of labs on 07/01/22 and she would like to go over the results  I informed her a phosphorus lab was ordered at his last office visit that has not been completed  I told her I will send this information to Dr Florence Moncada and will give her a c/b with any recommendations she may have      (Patient completed labs at [facility])

## 2022-07-06 NOTE — TELEPHONE ENCOUNTER
----- Message from Vivian Rosenberg MD sent at 7/4/2022 11:08 AM EDT -----  Regarding: PT/INR  Verify current Coumadin dosage    May need to decrease slightly as he has gone high in the past   ----- Message -----  From: [facility] Lab Results In  Sent: 7/2/2022   6:06 AM EDT  To: Vivian Rosenberg MD

## 2022-07-06 NOTE — TELEPHONE ENCOUNTER
I spoke to pt's wife Maximiliano Dalal her Dr Dick Shin did review his labs and it shows his creatinine level is at 4 which seems to be improving elevated CR is most likely due to diuretics and bactrim use  Protein in the urine has reduced so Dr Dick Shin will like to continue current treatment plan which includes weekly testing  Also, informed her once he completes the phos testing she will be able to determine if sevelamer medication will be a long term use  Pt's wife verbalized understanding and stated his edema decreased minimally in his b/l feet and ankles  Sanaz Clements asked should the phos test be added weekly? I told her as of now no due to it not being a standing order she would like for Yeimi Malone to check his levels now and follow up in office on 07/19/22  I faxed lab scripts to [facility] in [facility] [phone]

## 2022-07-07 ENCOUNTER — OFFICE VISIT (OUTPATIENT)
Dept: PHYSICAL THERAPY | Facility: CLINIC | Age: 72
End: 2022-07-07
Payer: COMMERCIAL

## 2022-07-07 DIAGNOSIS — Z48.89 ENCOUNTER FOR POSTOPERATIVE CARE RELATED TO SURGICAL JOINT FUSION: Primary | ICD-10-CM

## 2022-07-07 DIAGNOSIS — M54.2 NECK PAIN: ICD-10-CM

## 2022-07-07 DIAGNOSIS — Z98.1 ENCOUNTER FOR POSTOPERATIVE CARE RELATED TO SURGICAL JOINT FUSION: Primary | ICD-10-CM

## 2022-07-07 PROCEDURE — 97112 NEUROMUSCULAR REEDUCATION: CPT | Performed by: PHYSICAL THERAPIST

## 2022-07-07 PROCEDURE — 97110 THERAPEUTIC EXERCISES: CPT | Performed by: PHYSICAL THERAPIST

## 2022-07-07 NOTE — PROGRESS NOTES
Daily Note     Today's date: 2022  Patient name: Jhonathan Tay  : 1950  MRN: [id number]  Referring provider: Christopher Devries  Dx:   Encounter Diagnosis     ICD-10-CM    1  Encounter for postoperative care related to surgical joint fusion  Z48 89     Z98 1    2  Neck pain  M54 2                   Subjective: Patient reports good tolerance to his LV  He reports he is fatigued today but overall is feeling very good with his neck  He has transitioned to ambulating with a SPC  Objective: See treatment diary below      Assessment:   Stage: chronic   Stability of Symptoms:  At Evaluation: stable - unchanged  Global Rating of Change:   Symptom Irritability Level: low  Primary Movement Diagnosis: poor motor control   Patient Specific Functional Scale:   22: return to walking without AD 0/10, return to walking on the beach 0/10, perform housework and yard work with minimal to no assistance 0/10; Total   FOTO Prediction: 65 by visit 12  FOTO Progress: 59 at evaluation   Greatest Concern: difficulty balancing   Current Activity Plan: added to HEP ()  Current Educational Needs: progressions    Patient had good exercise form and recall throughout treatment  He had no complaints of increased pain throughout treatment  He displayed improvement with balance control but continues to fatigue very easily  Skilled PT continues to be required to guide progression of cervical and UE strength as well as balance to allow him to return to walking without AD and performing housework/ yard work          Plan: Continue with POC - monitor tolerance to treatment - progress as able NV     Daily Treatment Diary     DX: s/p Cervical Fusion C3-T1  EPOC: 22  Follow Up with Referring Provider: AVINASH  Precautions: NA  CO-MORBIDITIES: A-fib, Hx of DVT  HEP ACCESS CODE: [id number]       Treatment Diary        Manual Therapy Therapeutic Exercise  HEP         UBE  3'/3' 3'/3' 3'/3'                Cervical AROM Matrix 6/23 10x ea 10x ea 10x ea      Cervical Isometrics  4 Way  5"x10 ea 4 Way  5"x10 ea 4 Way  5"x10 ea                Standing Sh' Flex  2# 10x 2# 10x 2# 10x      Standing Sh' Scaption  2# 10x 2# 10x 2# 10x      Standing Sh' ABD  2# 10x 2# 10x 2# 10x                                    Neuromuscular Reeducation          TB Sh' Row  GTB  20x GTB  20x GTB  20x      TB Sh' Ext  GTB  20x GTB  20x GTB  20x      TB Triceps                     Romberg Balance on Foam  30"x2 30"x2 30"x2      Romberg with Reach Matrix  On Foam  10x ea On Foam  10x ea On Foam  10x ea                Sidestepping on Foam   6 Feet  3 laps 6 Feet  3 laps                                    Therapeutic Activity                              Gait Training                                        Modalities

## 2022-07-08 ENCOUNTER — TELEPHONE (OUTPATIENT)
Dept: NEPHROLOGY | Facility: CLINIC | Age: 72
End: 2022-07-08

## 2022-07-08 DIAGNOSIS — N17.9 AKI (ACUTE KIDNEY INJURY) (HCC): ICD-10-CM

## 2022-07-08 DIAGNOSIS — I10 ESSENTIAL HYPERTENSION: Primary | ICD-10-CM

## 2022-07-08 DIAGNOSIS — I10 ESSENTIAL HYPERTENSION: ICD-10-CM

## 2022-07-08 LAB — PHOSPHATE SERPL-MCNC: 2.4 MG/DL (ref 2.8–4.1)

## 2022-07-08 RX ORDER — AMLODIPINE BESYLATE 10 MG/1
10 TABLET ORAL DAILY
Qty: 90 TABLET | Refills: 3 | Status: CANCELLED | OUTPATIENT
Start: 2022-07-08 | End: 2022-08-07

## 2022-07-08 NOTE — TELEPHONE ENCOUNTER
Pt called to request 1 refill  rx-  Amlodipine (Norvasc) 10 mg     Pharmacy -  [facility]  [phone]    Please review and send rx  Please be aware to send to the [facility], rather than pt's usual Mail order Pharm

## 2022-07-08 NOTE — TELEPHONE ENCOUNTER
Patients wife wants a repeat phos level on on 7/18 prior to their appt as she feels he should not wait that long for repeat labs  They are also questioning what side effects they should look out for it his phos level goes up again?

## 2022-07-08 NOTE — TELEPHONE ENCOUNTER
Patients wife called the office and would like the Phos level of 2 4 reviewed  They ran out of Sevelamar but wish not to refill if not necessary as it does cost $100 each time  Can you please advise on any changes

## 2022-07-11 ENCOUNTER — APPOINTMENT (OUTPATIENT)
Dept: PHYSICAL THERAPY | Facility: CLINIC | Age: 72
End: 2022-07-11
Payer: COMMERCIAL

## 2022-07-12 ENCOUNTER — ANTICOAG VISIT (OUTPATIENT)
Dept: FAMILY MEDICINE CLINIC | Facility: CLINIC | Age: 72
End: 2022-07-12

## 2022-07-12 ENCOUNTER — TELEPHONE (OUTPATIENT)
Dept: FAMILY MEDICINE CLINIC | Facility: CLINIC | Age: 72
End: 2022-07-12

## 2022-07-12 LAB
INR PPP: 3.3 (ref 0.9–1.2)
PHOSPHATE SERPL-MCNC: 4.6 MG/DL (ref 2.8–4.1)
PROTHROMBIN TIME: 33.4 SEC (ref 9.1–12)

## 2022-07-12 NOTE — TELEPHONE ENCOUNTER
----- Message from Denise Lennon MD sent at 7/12/2022 12:36 PM EDT -----  Regarding: FW: INR  Continue same-I believe he goes weekly    ----- Message -----  From: Mae Coe  Sent: 7/12/2022   9:37 AM EDT  To: Denise Lennon MD  Subject: INR                                              Please review INR results  ----- Message -----  From: [facility] Lab Results In  Sent: 7/12/2022   6:06 AM EDT  To: Danae Alvarez [facility] Clinical

## 2022-07-14 ENCOUNTER — OFFICE VISIT (OUTPATIENT)
Dept: PHYSICAL THERAPY | Facility: CLINIC | Age: 72
End: 2022-07-14
Payer: COMMERCIAL

## 2022-07-14 DIAGNOSIS — Z98.1 ENCOUNTER FOR POSTOPERATIVE CARE RELATED TO SURGICAL JOINT FUSION: Primary | ICD-10-CM

## 2022-07-14 DIAGNOSIS — M54.2 NECK PAIN: ICD-10-CM

## 2022-07-14 DIAGNOSIS — Z48.89 ENCOUNTER FOR POSTOPERATIVE CARE RELATED TO SURGICAL JOINT FUSION: Primary | ICD-10-CM

## 2022-07-14 PROCEDURE — 97112 NEUROMUSCULAR REEDUCATION: CPT | Performed by: PHYSICAL THERAPIST

## 2022-07-14 PROCEDURE — 97110 THERAPEUTIC EXERCISES: CPT | Performed by: PHYSICAL THERAPIST

## 2022-07-14 NOTE — PROGRESS NOTES
Daily Note     Today's date: 2022  Patient name: Alexandru Kathleen  : 1950  MRN: [id number]  Referring provider: Tamra Singh  Dx:   Encounter Diagnosis     ICD-10-CM    1  Encounter for postoperative care related to surgical joint fusion  Z48 89     Z98 1    2  Neck pain  M54 2                   Subjective: Patient reports no adverse reaction to his LV  He continues to feel better with his neck but notes fatigue continues to be a significant limiting factor with his function  Objective: See treatment diary below      Assessment:   Stage: chronic   Stability of Symptoms:  At Evaluation: stable - unchanged  Global Rating of Change:   Symptom Irritability Level: low  Primary Movement Diagnosis: poor motor control   Patient Specific Functional Scale:   22: return to walking without AD 0/10, return to walking on the beach 0/10, perform housework and yard work with minimal to no assistance 0/10; Total 0  FOTO Prediction: 65 by visit 12  FOTO Progress: 59 at evaluation   Greatest Concern: difficulty balancing   Current Activity Plan: added to HEP ()  Current Educational Needs: progressions    Patient had good tolerance to cervical exercises  He continues to fatigue easily with exercises but had good form throughout treatment  Skilled PT continues to be required to guide progression of cervical and UE strength as well as balance to allow him to return to walking without AD and performing housework/ yard work          Plan: Continue with POC - monitor tolerance to treatment - progress as able NV     Daily Treatment Diary     DX: s/p Cervical Fusion C3-T1  EPOC: 22  Follow Up with Referring Provider: AVINASH  Precautions: NA  CO-MORBIDITIES: A-fib, Hx of DVT  HEP ACCESS CODE: [id number]       Treatment Diary       Manual Therapy                                                                        Therapeutic Exercise  HEP         UBE  3'/3' 3'/3' 3'/3' 3'/3' Cervical AROM Matrix 6/23 10x ea 10x ea 10x ea 10x ea     Cervical Isometrics  4 Way  5"x10 ea 4 Way  5"x10 ea 4 Way  5"x10 ea 4 Way  5"x10 ea               Standing Sh' Flex  2# 10x 2# 10x 2# 10x 2# 10x     Standing Sh' Scaption  2# 10x 2# 10x 2# 10x 2# 10x     Standing Sh' ABD  2# 10x 2# 10x 2# 10x 2# 10x                                   Neuromuscular Reeducation          TB Sh' Row  GTB  20x GTB  20x GTB  20x GTB  20x     TB Sh' Ext  GTB  20x GTB  20x GTB  20x GTB  20x     TB Triceps                     Romberg Balance on Foam  30"x2 30"x2 30"x2 30"x2     Romberg with Reach Matrix  On Foam  10x ea On Foam  10x ea On Foam  10x ea On Foam  10x ea               Sidestepping on Foam   6 Feet  3 laps 6 Feet  3 laps 6 Feet  3 laps                                   Therapeutic Activity                              Gait Training                                        Modalities

## 2022-07-15 ENCOUNTER — TELEPHONE (OUTPATIENT)
Dept: NEPHROLOGY | Facility: CLINIC | Age: 72
End: 2022-07-15

## 2022-07-15 NOTE — TELEPHONE ENCOUNTER
rec'd phone call from patients wife, Ruthie Bustos - concerned that patient went for labs on Monday and they have had the results in [address] but have not heard from our office  Wife is concerned because he was told to stop a medication last week that was lowering his phosphorus levels and now, this week, the levels are back up  Wife stated that the medication is expensive so they didn't really want to continue it but now they're questioning if they should  Wife would like labs reviewed and a phone call to discuss

## 2022-07-18 ENCOUNTER — TELEPHONE (OUTPATIENT)
Dept: NEPHROLOGY | Facility: CLINIC | Age: 72
End: 2022-07-18

## 2022-07-18 ENCOUNTER — OFFICE VISIT (OUTPATIENT)
Dept: PHYSICAL THERAPY | Facility: CLINIC | Age: 72
End: 2022-07-18
Payer: COMMERCIAL

## 2022-07-18 DIAGNOSIS — Z98.1 ENCOUNTER FOR POSTOPERATIVE CARE RELATED TO SURGICAL JOINT FUSION: Primary | ICD-10-CM

## 2022-07-18 DIAGNOSIS — Z48.89 ENCOUNTER FOR POSTOPERATIVE CARE RELATED TO SURGICAL JOINT FUSION: Primary | ICD-10-CM

## 2022-07-18 DIAGNOSIS — M54.2 NECK PAIN: ICD-10-CM

## 2022-07-18 PROCEDURE — 97110 THERAPEUTIC EXERCISES: CPT | Performed by: PHYSICAL THERAPIST

## 2022-07-18 PROCEDURE — 97112 NEUROMUSCULAR REEDUCATION: CPT | Performed by: PHYSICAL THERAPIST

## 2022-07-18 NOTE — PROGRESS NOTES
Daily Note     Today's date: 2022  Patient name: Eli Huffman  : 1950  MRN: [id number]  Referring provider: Noé Drake  Dx:   Encounter Diagnosis     ICD-10-CM    1  Encounter for postoperative care related to surgical joint fusion  Z48 89     Z98 1    2  Neck pain  M54 2                   Subjective: Patient reports his neck is feeling very good - minimal discomfort and functional restrictions at this time  Objective: See treatment diary below      Assessment:   Stage: chronic   Stability of Symptoms:  At Evaluation: stable - unchanged  Global Rating of Change:   Symptom Irritability Level: low  Primary Movement Diagnosis: poor motor control   Patient Specific Functional Scale:   22: return to walking without AD 0/10, return to walking on the beach 0/10, perform housework and yard work with minimal to no assistance 0/10; Total 030  FOTO Prediction: 65 by visit 12  FOTO Progress: 59 at evaluation   Greatest Concern: difficulty balancing   Current Activity Plan: added to HEP ()  Current Educational Needs: progressions    Patient had good tolerance to cervical exercises  He displayed independence with HEP  At this time it is recommended that he continue with an I HEP  He is to contact me if he has return of symptoms or any questions/ concerns              Plan: DC to I HEP     Daily Treatment Diary     DX: s/p Cervical Fusion C3-T1  EPOC: 22  Follow Up with Referring Provider: AVINASH  Precautions: NA  CO-MORBIDITIES: A-fib, Hx of DVT  HEP ACCESS CODE: [id number]       Treatment Diary      Manual Therapy                                                                        Therapeutic Exercise  HEP         UBE  3'/3' 3'/3' 3'/3' 3'/3' 3'/3'              Cervical AROM Matrix  10x ea 10x ea 10x ea 10x ea 10x ea    Cervical Isometrics  4 Way  5"x10 ea 4 Way  5"x10 ea 4 Way  5"x10 ea 4 Way  5"x10 ea 4 Way  5"x10 ea              Standing Sh' Flex 2# 10x 2# 10x 2# 10x 2# 10x 2# 10x    Standing Sh' Scaption  2# 10x 2# 10x 2# 10x 2# 10x 2# 10x    Standing Sh' ABD  2# 10x 2# 10x 2# 10x 2# 10x 2# 10x                                  Neuromuscular Reeducation          TB Sh' Row  GTB  20x GTB  20x GTB  20x GTB  20x GTB  20x    TB Sh' Ext  GTB  20x GTB  20x GTB  20x GTB  20x GTB  20x    TB Triceps                     Romberg Balance on Foam  30"x2 30"x2 30"x2 30"x2     Romberg with Reach Matrix  On Foam  10x ea On Foam  10x ea On Foam  10x ea On Foam  10x ea On Foam  10x ea              Sidestepping on Foam   6 Feet  3 laps 6 Feet  3 laps 6 Feet  3 laps 6 Feet  3 laps                                  Therapeutic Activity                              Gait Training                                        Modalities

## 2022-07-18 NOTE — TELEPHONE ENCOUNTER
Lm for the patients wife Sanaz Clements, I told them that labs are so far unchanged and would be further discussed during tomorrows appt   ]

## 2022-07-18 NOTE — TELEPHONE ENCOUNTER
Appointment Confirmation   Person confirmed appointment with  If not patient, name of the person Voice msg    Date and time of appointment 7/19 12:00    Patient acknowledged and will be at appointment? no    Did you advise the patient that they will need a urine sample if they are a new patient?  N/A    Did you advise the patient to bring their current medications for verification? (including any OTC) Yes    Additional Information

## 2022-07-19 ENCOUNTER — PATIENT OUTREACH (OUTPATIENT)
Dept: FAMILY MEDICINE CLINIC | Facility: CLINIC | Age: 72
End: 2022-07-19

## 2022-07-19 ENCOUNTER — OFFICE VISIT (OUTPATIENT)
Dept: FAMILY MEDICINE CLINIC | Facility: CLINIC | Age: 72
End: 2022-07-19
Payer: COMMERCIAL

## 2022-07-19 ENCOUNTER — ANTICOAG VISIT (OUTPATIENT)
Dept: FAMILY MEDICINE CLINIC | Facility: CLINIC | Age: 72
End: 2022-07-19

## 2022-07-19 ENCOUNTER — TELEPHONE (OUTPATIENT)
Dept: OTHER | Facility: OTHER | Age: 72
End: 2022-07-19

## 2022-07-19 ENCOUNTER — HOSPITAL ENCOUNTER (EMERGENCY)
Facility: HOSPITAL | Age: 72
Discharge: HOME | End: 2022-07-19
Attending: EMERGENCY MEDICINE
Payer: COMMERCIAL

## 2022-07-19 ENCOUNTER — OFFICE VISIT (OUTPATIENT)
Dept: NEPHROLOGY | Facility: CLINIC | Age: 72
End: 2022-07-19
Payer: COMMERCIAL

## 2022-07-19 ENCOUNTER — APPOINTMENT (EMERGENCY)
Dept: RADIOLOGY | Facility: HOSPITAL | Age: 72
End: 2022-07-19
Payer: COMMERCIAL

## 2022-07-19 VITALS
DIASTOLIC BLOOD PRESSURE: 80 MMHG | HEIGHT: 71 IN | TEMPERATURE: 97.2 F | WEIGHT: 233 LBS | SYSTOLIC BLOOD PRESSURE: 132 MMHG | HEART RATE: 62 BPM | BODY MASS INDEX: 32.62 KG/M2 | OXYGEN SATURATION: 97 %

## 2022-07-19 VITALS
BODY MASS INDEX: 32.5 KG/M2 | WEIGHT: 233 LBS | OXYGEN SATURATION: 99 % | SYSTOLIC BLOOD PRESSURE: 156 MMHG | DIASTOLIC BLOOD PRESSURE: 86 MMHG | TEMPERATURE: 97.5 F | HEART RATE: 56 BPM | RESPIRATION RATE: 17 BRPM

## 2022-07-19 VITALS
DIASTOLIC BLOOD PRESSURE: 64 MMHG | HEART RATE: 61 BPM | BODY MASS INDEX: 33.04 KG/M2 | SYSTOLIC BLOOD PRESSURE: 136 MMHG | HEIGHT: 71 IN | WEIGHT: 236 LBS

## 2022-07-19 DIAGNOSIS — E83.39 HYPERPHOSPHATEMIA: ICD-10-CM

## 2022-07-19 DIAGNOSIS — N17.9 AKI (ACUTE KIDNEY INJURY) (HCC): ICD-10-CM

## 2022-07-19 DIAGNOSIS — N02.8 IGA NEPHROPATHY DETERMINED BY BIOPSY OF KIDNEY: Primary | ICD-10-CM

## 2022-07-19 DIAGNOSIS — D64.9 ANEMIA, UNSPECIFIED TYPE: ICD-10-CM

## 2022-07-19 DIAGNOSIS — D64.9 ANEMIA, UNSPECIFIED TYPE: Primary | ICD-10-CM

## 2022-07-19 DIAGNOSIS — I50.32 CHRONIC DIASTOLIC (CONGESTIVE) HEART FAILURE (HCC): ICD-10-CM

## 2022-07-19 DIAGNOSIS — R80.8 OTHER PROTEINURIA: ICD-10-CM

## 2022-07-19 DIAGNOSIS — I10 ESSENTIAL HYPERTENSION: ICD-10-CM

## 2022-07-19 DIAGNOSIS — R31.29 MICROSCOPIC HEMATURIA: ICD-10-CM

## 2022-07-19 DIAGNOSIS — D68.59 HYPERCOAGULABLE STATE (HCC): ICD-10-CM

## 2022-07-19 DIAGNOSIS — R77.8 ELEVATED TROPONIN: ICD-10-CM

## 2022-07-19 DIAGNOSIS — R53.83 FATIGUE: ICD-10-CM

## 2022-07-19 DIAGNOSIS — D68.51 FACTOR V LEIDEN MUTATION (HCC): ICD-10-CM

## 2022-07-19 DIAGNOSIS — R60.0 LOWER EXTREMITY EDEMA: ICD-10-CM

## 2022-07-19 LAB
2HR DELTA HS TROPONIN: -5 NG/L
ABO GROUP BLD BPU: NORMAL
ABO GROUP BLD: NORMAL
ALBUMIN SERPL BCP-MCNC: 3.2 G/DL (ref 3.5–5)
ALP SERPL-CCNC: 35 U/L (ref 46–116)
ALT SERPL W P-5'-P-CCNC: 19 U/L (ref 12–78)
ANION GAP SERPL CALCULATED.3IONS-SCNC: 13 MMOL/L (ref 4–13)
APTT PPP: 34 SECONDS (ref 23–37)
AST SERPL W P-5'-P-CCNC: 10 U/L (ref 5–45)
BASOPHILS # BLD AUTO: 0.01 THOUSANDS/ΜL (ref 0–0.1)
BASOPHILS NFR BLD AUTO: 0 % (ref 0–1)
BILIRUB SERPL-MCNC: 0.3 MG/DL (ref 0.2–1)
BLD GP AB SCN SERPL QL: NEGATIVE
BPU ID: NORMAL
BUN SERPL-MCNC: 99 MG/DL (ref 5–25)
CALCIUM ALBUM COR SERPL-MCNC: 8.4 MG/DL (ref 8.3–10.1)
CALCIUM SERPL-MCNC: 7.8 MG/DL (ref 8.3–10.1)
CARDIAC TROPONIN I PNL SERPL HS: 22 NG/L
CARDIAC TROPONIN I PNL SERPL HS: 27 NG/L
CHLORIDE SERPL-SCNC: 99 MMOL/L (ref 96–108)
CO2 SERPL-SCNC: 22 MMOL/L (ref 21–32)
CREAT SERPL-MCNC: 4.93 MG/DL (ref 0.6–1.3)
CROSSMATCH: NORMAL
EOSINOPHIL # BLD AUTO: 0 THOUSAND/ΜL (ref 0–0.61)
EOSINOPHIL NFR BLD AUTO: 0 % (ref 0–6)
ERYTHROCYTE [DISTWIDTH] IN BLOOD BY AUTOMATED COUNT: 15.4 % (ref 11.6–15.1)
GFR SERPL CREATININE-BSD FRML MDRD: 10 ML/MIN/1.73SQ M
GLUCOSE SERPL-MCNC: 146 MG/DL (ref 65–140)
HCT VFR BLD AUTO: 21.6 % (ref 36.5–49.3)
HGB BLD-MCNC: 7.1 G/DL (ref 12–17)
IMM GRANULOCYTES # BLD AUTO: 0.14 THOUSAND/UL (ref 0–0.2)
IMM GRANULOCYTES NFR BLD AUTO: 2 % (ref 0–2)
INR PPP: 4.67 (ref 0.84–1.19)
LYMPHOCYTES # BLD AUTO: 0.47 THOUSANDS/ΜL (ref 0.6–4.47)
LYMPHOCYTES NFR BLD AUTO: 5 % (ref 14–44)
MCH RBC QN AUTO: 30 PG (ref 26.8–34.3)
MCHC RBC AUTO-ENTMCNC: 32.9 G/DL (ref 31.4–37.4)
MCV RBC AUTO: 91 FL (ref 82–98)
MONOCYTES # BLD AUTO: 0.3 THOUSAND/ΜL (ref 0.17–1.22)
MONOCYTES NFR BLD AUTO: 3 % (ref 4–12)
NEUTROPHILS # BLD AUTO: 7.86 THOUSANDS/ΜL (ref 1.85–7.62)
NEUTS SEG NFR BLD AUTO: 90 % (ref 43–75)
NRBC BLD AUTO-RTO: 0 /100 WBCS
PHOSPHATE SERPL-MCNC: 5.3 MG/DL (ref 2.8–4.1)
PLATELET # BLD AUTO: 217 THOUSANDS/UL (ref 149–390)
PMV BLD AUTO: 9.1 FL (ref 8.9–12.7)
POTASSIUM SERPL-SCNC: 4.9 MMOL/L (ref 3.5–5.3)
PROT SERPL-MCNC: 6.5 G/DL (ref 6.4–8.4)
PROTHROMBIN TIME: 45.9 SECONDS (ref 11.6–14.5)
RBC # BLD AUTO: 2.37 MILLION/UL (ref 3.88–5.62)
RH BLD: POSITIVE
SODIUM SERPL-SCNC: 134 MMOL/L (ref 135–147)
SPECIMEN EXPIRATION DATE: NORMAL
UNIT DISPENSE STATUS: NORMAL
UNIT PRODUCT CODE: NORMAL
UNIT PRODUCT VOLUME: 350 ML
UNIT RH: NORMAL
WBC # BLD AUTO: 8.78 THOUSAND/UL (ref 4.31–10.16)

## 2022-07-19 PROCEDURE — 3079F DIAST BP 80-89 MM HG: CPT | Performed by: FAMILY MEDICINE

## 2022-07-19 PROCEDURE — 93005 ELECTROCARDIOGRAM TRACING: CPT

## 2022-07-19 PROCEDURE — 86900 BLOOD TYPING SEROLOGIC ABO: CPT | Performed by: EMERGENCY MEDICINE

## 2022-07-19 PROCEDURE — 1160F RVW MEDS BY RX/DR IN RCRD: CPT | Performed by: INTERNAL MEDICINE

## 2022-07-19 PROCEDURE — 85730 THROMBOPLASTIN TIME PARTIAL: CPT | Performed by: EMERGENCY MEDICINE

## 2022-07-19 PROCEDURE — 99284 EMERGENCY DEPT VISIT MOD MDM: CPT

## 2022-07-19 PROCEDURE — 36415 COLL VENOUS BLD VENIPUNCTURE: CPT | Performed by: EMERGENCY MEDICINE

## 2022-07-19 PROCEDURE — 3725F SCREEN DEPRESSION PERFORMED: CPT | Performed by: FAMILY MEDICINE

## 2022-07-19 PROCEDURE — 85025 COMPLETE CBC W/AUTO DIFF WBC: CPT | Performed by: EMERGENCY MEDICINE

## 2022-07-19 PROCEDURE — 99214 OFFICE O/P EST MOD 30 MIN: CPT | Performed by: FAMILY MEDICINE

## 2022-07-19 PROCEDURE — 86901 BLOOD TYPING SEROLOGIC RH(D): CPT | Performed by: EMERGENCY MEDICINE

## 2022-07-19 PROCEDURE — 99285 EMERGENCY DEPT VISIT HI MDM: CPT | Performed by: EMERGENCY MEDICINE

## 2022-07-19 PROCEDURE — 80053 COMPREHEN METABOLIC PANEL: CPT | Performed by: EMERGENCY MEDICINE

## 2022-07-19 PROCEDURE — 86923 COMPATIBILITY TEST ELECTRIC: CPT

## 2022-07-19 PROCEDURE — P9016 RBC LEUKOCYTES REDUCED: HCPCS

## 2022-07-19 PROCEDURE — 36430 TRANSFUSION BLD/BLD COMPNT: CPT

## 2022-07-19 PROCEDURE — 99214 OFFICE O/P EST MOD 30 MIN: CPT | Performed by: INTERNAL MEDICINE

## 2022-07-19 PROCEDURE — 71045 X-RAY EXAM CHEST 1 VIEW: CPT

## 2022-07-19 PROCEDURE — 3075F SYST BP GE 130 - 139MM HG: CPT | Performed by: FAMILY MEDICINE

## 2022-07-19 PROCEDURE — 84484 ASSAY OF TROPONIN QUANT: CPT | Performed by: EMERGENCY MEDICINE

## 2022-07-19 PROCEDURE — 86850 RBC ANTIBODY SCREEN: CPT | Performed by: EMERGENCY MEDICINE

## 2022-07-19 PROCEDURE — 85610 PROTHROMBIN TIME: CPT | Performed by: EMERGENCY MEDICINE

## 2022-07-19 RX ORDER — FUROSEMIDE 20 MG/1
TABLET ORAL
COMMUNITY
Start: 2022-06-28 | End: 2022-08-04 | Stop reason: ALTCHOICE

## 2022-07-19 NOTE — PATIENT INSTRUCTIONS
1  JANEL d/t IgAN, biopsy proven, in setting of recent MRSA infection  -renal biopsy performed May 26, 20 102 was limited as only 6 intact glomeruli were present for evaluation on light microscopy  IgA dominant immune complex deposition noted by immunofluorescence  Differential includes IgA nephropathy both primary and secondary forms versus infectious associated glomerulonephritis  Staph aureus infections have been associated with IgA dominant immune complex deposition  Patient did have recent MRSA surgical site infection so infection associated GN should be considered   -prednisone 60 mg daily begun June 2nd   -on Bactrim 3 times weekly for PJP prophylaxis   -on Protonix 40 mg twice daily for GI prophylaxis  -continue oscal D for bone protection on high dose steroids  -continue daily prednisone 60mg for now  -monitor weekly bloodwork and urine testing  -if this was a primary IgA nephropathy, it would be compatible with Boone classification of M0 E1 S0 T1-C1   -of 38 glomeruli, 6 were intact, forehead global glomerulosclerosis, segmental sclerosis was absent  Moderate interstitial fibrosis and tubular atrophy as well as arterial intimal fibrosis and mild arterolar hyalinosis were noted  -did not require dialysis inpatient, permacath removed prior to discharge  -sCr now 4 18 and stable, slowly improving from 5 34  -BUN elevated to 89  No uremic signs/symptoms to warrant HD initiation    -as the patient has had an acute onset of rapidly progressive glomerulonephritis with normal complement levels and deposition of mesangial IgA, 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  -will continue prednisone 20mg x 3 weeks, then drop to 10mg x 3 weeks  To be seen again within 1 month  -may need to consider rituxan infusion in light of podocytopathy       2  Hyponatremia, hypercalcemia - resolved     3  Hyperphosphatemia - phos 5 3 as of 7/18/22, monitor phos monthly    Will hold off on restarting Renagel 800 mg 3 times daily with meals for now  Likely d/t decreased excretion in setting of JANEL  Should follow low phos diet  4  Proteinuria - UpCr in setting of IgAN shows UpCr improved from 12 4g to 2g as of 7/11/22      5  HTN - BP well controlled, continue metoprolol 100 mg daily, Cardura 1 mg at bedtime, amlodipine 10 mg daily, flomax     6  Anemia - Hgb 7 5 and stable as of 7/11/22, monitor CBC weekly     7  LE edema likely d/t nephrotic syndrome - continue torsemide 20mg daily, will monitor K/SCr weekly  Monitor daily weight/BP readings  Will continue weekly CMP, CBC, Urinalysis and urine protein:Cr and monthly phos  Continue current prophylaxis and 20mg prednisone daily  Continue torsemide 20mg daily for edema/nephrotic syndrome  RTC in 1 month

## 2022-07-19 NOTE — TELEPHONE ENCOUNTER
PT:  Bella Garcia-ELAYNE 1950  Lab Result: Critical   Date/Time Drawn: 2022 @ 0190   Ordering Provider: Dr Viraj Todd Name: Rodney Deshpande @ [facility] [phone]-  REF# [id number]       The following critical/stat result was read back to the lab as stated above and [employer] to the on-call provider

## 2022-07-19 NOTE — PROGRESS NOTES
Outpatient Care Management Note:    Chart reviewed for outreach purposes  Yeimi Malone is scheduled with nephrology and his PCP this afternoon  CM will follow up next week

## 2022-07-19 NOTE — PROGRESS NOTES
Hgb low and patient symptomatic  BUN and CR higher as of 7/18  ADT order placed to [facility] for blood transfusion

## 2022-07-19 NOTE — PROGRESS NOTES
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     2  Factor V Leiden mutation ([facility] [address] )     3  Anemia, unspecified type         No problem-specific Assessment & Plan notes found for this encounter  Patient Instructions     Patient Instructions   Due to low hemoglobin  Nephrology is of eyes that he should proceed to the hospital emergency room for evaluation and possible transfusion  They will recheck his PT iron are after not have any Coumadin today  No signs of active bleeding  Blood pressure stable  Patient will follow-up post ER/hospital discharge  They would wish to review medications and see whether anything could be decreased  Follow-up with Nephrology as previously advised  Chief Complaint     Chief Complaint   Patient presents with    Follow-up     Dizziness          History of Present Illness        Patient comes in today for follow-up from multiple recent hospitalizations  Lab work done yesterday showed elevated PT INR at 5 3  Subsequent labs a came in later today show hemoglobin down below 7  He has been having multiple transfusions due to IgA nephropathy  He did see Nephrology earlier today  They called when they got the results and advised to go to the hospital emergency room for transfusion  The try to keep his hemoglobin above 7  He is having no active bleeding but there are multiple bruises  Review of Systems   Review of Systems   Constitutional: Negative for appetite change, chills, diaphoresis and fever  HENT: Negative for ear pain, rhinorrhea, sinus pressure and sore throat  Eyes: Negative for discharge, redness and itching  Respiratory: Negative for cough, shortness of breath and wheezing  Cardiovascular: Negative for chest pain and palpitations  Rapid or slow heart rate   Gastrointestinal: Negative for abdominal pain, diarrhea, nausea and vomiting  Genitourinary: Negative for hematuria  Skin: Positive for wound  Neurological: Positive for headaches  Negative for dizziness and light-headedness           Current Medications       Current Outpatient Medications:     Accu-Chek Softclix Lancets lancets, Use as instructed daily to test sugar E11 9, Disp: 200 each, Rfl: 2    acetaminophen (TYLENOL) 325 mg tablet, Take 3 tablets (975 mg total) by mouth 3 (three) times a day as needed for mild pain, Disp: , Rfl: 0    amLODIPine (NORVASC) 10 mg tablet, Take 1 tablet (10 mg total) by mouth daily, Disp: 90 tablet, Rfl: 3    Blood Glucose Monitoring Suppl (Accu-Chek Guide) w/Device KIT, , Disp: , Rfl:     calcium carbonate-vitamin D (OSCAL-D) 500 mg-200 units per tablet, Take 1 tablet by mouth daily with breakfast, Disp: 30 tablet, Rfl: 0    doxazosin (CARDURA) 1 mg tablet, Take 1 tablet (1 mg total) by mouth daily at bedtime, Disp: 30 tablet, Rfl: 0    flecainide (TAMBOCOR) 100 mg tablet, TAKE 1 TABLET BY MOUTH  TWICE DAILY, Disp: 180 tablet, Rfl: 1    furosemide (LASIX) 20 mg tablet, , Disp: , Rfl:     glucose blood (Accu-Chek Guide) test strip, USE TO TEST AS DIRECTED, Disp: 50 strip, Rfl: 2    methocarbamol (ROBAXIN) 750 mg tablet, Take 1 tablet (750 mg total) by mouth every 6 (six) hours as needed for muscle spasms, Disp: 30 tablet, Rfl: 0    metoprolol succinate (TOPROL-XL) 100 mg 24 hr tablet, TAKE 1 TABLET BY MOUTH  DAILY, Disp: 90 tablet, Rfl: 0    ondansetron (ZOFRAN) 4 mg tablet, Take 1 tablet (4 mg total) by mouth every 8 (eight) hours as needed for nausea or vomiting, Disp: 30 tablet, Rfl: 0    oxyCODONE (ROXICODONE) 5 immediate release tablet, Take 1 tablet (5 mg total) by mouth every 6 (six) hours as needed for moderate pain Max Daily Amount: 20 mg, Disp: 10 tablet, Rfl: 0    pantoprazole (PROTONIX) 40 mg tablet, Take 1 tablet (40 mg total) by mouth 2 (two) times a day before meals, Disp: 180 tablet, Rfl: 3    pravastatin (PRAVACHOL) 40 mg tablet, TAKE 1 TABLET BY MOUTH  DAILY, Disp: 90 tablet, Rfl: 1    predniSONE 20 mg tablet, 60mg daily for 4 weeks, then 40mg daily x 2 weeks starting June 30th, 2022  Then 30mg daily x 2 weeks, 20mg daily x 2 weeks, 10mg daily x 2 weeks, 5mg daily, Disp: 270 tablet, Rfl: 0    sulfamethoxazole-trimethoprim (BACTRIM DS) 800-160 mg per tablet, Take 1 tablet by mouth 3 (three) times a week, Disp: 36 tablet, Rfl: 3    tamsulosin (FLOMAX) 0 4 mg, Take 1 capsule (0 4 mg total) by mouth daily with dinner, Disp: 90 capsule, Rfl: 1    torsemide (DEMADEX) 20 mg tablet, Take 1 tablet (20 mg total) by mouth daily, Disp: 90 tablet, Rfl: 1    warfarin (COUMADIN) 5 mg tablet, Take by mouth daily Take 2 5 mg (1/2 tablets) daily except Wednesday and Saturday take 5 mg daily  , Disp: , Rfl:     Current Allergies     Allergies as of 07/19/2022 - Reviewed 07/19/2022   Allergen Reaction Noted    Morphine GI Intolerance 09/11/2018    Vitamin k Other (See Comments) 12/28/2020            The following portions of the patient's history were reviewed and updated as appropriate: allergies, current medications, past family history, past medical history, past social history, past surgical history and problem list      Past Medical History:   Diagnosis Date    Acute venous embolism and thrombosis of deep vessels of proximal lower extremity (HCC)     Last assessed: 5/18/15    Arthritis     Cellulitis     LE    Chronic kidney disease 3/2020    Acute Kidney Injury    CPAP (continuous positive airway pressure) dependence     Factor V Leiden ([address] )     Forgetfulness     Gross hematuria 5/17/2022    Headache(784 0) 3/2022    Never till cervical  spine surgery    Heart murmur 3/2020    Told when in hospital   [facility] [place], operated by [facility] (hard of hearing)     Hypoalbuminemia 5/11/2022    Paroxysmal atrial fibrillation (HCC)     Last assessed: 11/2/15    Sleep apnea        Past Surgical History:   Procedure Laterality Date    BACK SURGERY      Lower    COLON SURGERY      COLONOSCOPY      INCISION AND DRAINAGE POSTERIOR SPINE N/A 4/1/2022    Procedure: Posterior cervical evacuation of postoperative collection and debridement with placement of drains C3-T1; Surgeon: Roland Darby MD;  Location: [facility];  Service: Neurosurgery    IR BIOPSY KIDNEY RANDOM  5/26/2022    IR TUNNELED DIALYSIS CATHETER PLACEMENT  5/23/2022    IR TUNNELED DIALYSIS CATHETER REMOVAL  6/2/2022    MS ARTHRODESIS ANT INTERBODY MIN DISCECTOMY, CERVICAL BELOW C2 N/A 3/11/2022    Procedure: Anterior cervical discectomy and fixation fusion C5/6 and C6/7; Posterior cervical decompression and instrumented fusion C3-T1; Surgeon: Roland Darby MD;  Location: [facility];  Service: Neurosurgery    SPINE SURGERY  3/11/2022    Cervical myelopathy/ cervical fusion       Family History   Problem Relation Age of Onset    Lung cancer Mother     Hearing loss Father     Heart attack Brother     Atrial fibrillation Brother     Emphysema Sister          Medications have been verified  Objective   /80   Pulse 62   Temp (!) 97 2 °F (36 2 °C) (Tympanic)   Ht 5' 11" (1 803 m)   Wt 106 kg (233 lb)   SpO2 97%   BMI 32 50 kg/m²        Physical Exam     Physical Exam  Vitals reviewed  Constitutional:       General: He is not in acute distress  Appearance: Normal appearance  He is ill-appearing  HENT:      Head: Normocephalic and atraumatic  Nose: Nose normal    Eyes:      Extraocular Movements: Extraocular movements intact  Pupils: Pupils are equal, round, and reactive to light  Cardiovascular:      Rate and Rhythm: Normal rate and regular rhythm  Heart sounds: Normal heart sounds  Pulmonary:      Effort: Pulmonary effort is normal       Breath sounds: Normal breath sounds  Musculoskeletal:      Right lower leg: Edema present  Left lower leg: Edema present  Neurological:      Mental Status: He is alert     Psychiatric:         Mood and Affect: Mood normal          Behavior: Behavior normal

## 2022-07-19 NOTE — ED PROVIDER NOTES
History  Chief Complaint   Patient presents with    Anemia     Patient told he has a hgb of 6 9 and feels tired all the time  Pt denies black or bloody stools  Patient takes coumadin for factor 5 leiden  Denies any bleeding  Patient is a 71year old male with a past medical history significant for IgA nephropathy on prednisone, anemia having required prior blood transfusions with recommendation to be >7, CKD 3 with Cr 4 18 (stable) per notes from nephrology from earlier today, factor V Leiden on chronic anticoagulation with coumadin, HTN, BL LE edema on torsemide who presents with outpatient labs that showed INR 5 3; hgb 6 9; BUN:Cr 99:4 78 as compared to 89:4 18 one week prior  Patient reports that he has been feeling extremely fatigued, lightheaded  States that when he gets up from sitting, the lightheadedness is worse  He feels like he has no stamina and gets tired with minimal activity  Denies any blood in stools, blood in urine, chest pain, palpitations, shortness of breath, change in LE swelling, fevers, chills  Prior to Admission Medications   Prescriptions Last Dose Informant Patient Reported? Taking?    Accu-Chek Softclix Lancets lancets   No No   Sig: Use as instructed daily to test sugar E11 9   Blood Glucose Monitoring Suppl (Accu-Chek Guide) w/Device KIT   Yes No   acetaminophen (TYLENOL) 325 mg tablet  Self No No   Sig: Take 3 tablets (975 mg total) by mouth 3 (three) times a day as needed for mild pain   amLODIPine (NORVASC) 10 mg tablet   No No   Sig: Take 1 tablet (10 mg total) by mouth daily   calcium carbonate-vitamin D (OSCAL-D) 500 mg-200 units per tablet  Self No No   Sig: Take 1 tablet by mouth daily with breakfast   doxazosin (CARDURA) 1 mg tablet   No No   Sig: Take 1 tablet (1 mg total) by mouth daily at bedtime   flecainide (TAMBOCOR) 100 mg tablet  Self No No   Sig: TAKE 1 TABLET BY MOUTH  TWICE DAILY   furosemide (LASIX) 20 mg tablet   Yes No   glucose blood (Accu-Chek Guide) test strip   No No   Sig: USE TO TEST AS DIRECTED   methocarbamol (ROBAXIN) 750 mg tablet   No No   Sig: Take 1 tablet (750 mg total) by mouth every 6 (six) hours as needed for muscle spasms   metoprolol succinate (TOPROL-XL) 100 mg 24 hr tablet  Self No No   Sig: TAKE 1 TABLET BY MOUTH  DAILY   ondansetron (ZOFRAN) 4 mg tablet  Self No No   Sig: Take 1 tablet (4 mg total) by mouth every 8 (eight) hours as needed for nausea or vomiting   oxyCODONE (ROXICODONE) 5 immediate release tablet   No No   Sig: Take 1 tablet (5 mg total) by mouth every 6 (six) hours as needed for moderate pain Max Daily Amount: 20 mg   pantoprazole (PROTONIX) 40 mg tablet   No No   Sig: Take 1 tablet (40 mg total) by mouth 2 (two) times a day before meals   pravastatin (PRAVACHOL) 40 mg tablet  Self No No   Sig: TAKE 1 TABLET BY MOUTH  DAILY   predniSONE 20 mg tablet   No No   Simg daily for 4 weeks, then 40mg daily x 2 weeks starting 2022  Then 30mg daily x 2 weeks, 20mg daily x 2 weeks, 10mg daily x 2 weeks, 5mg daily   sulfamethoxazole-trimethoprim (BACTRIM DS) 800-160 mg per tablet   No No   Sig: Take 1 tablet by mouth 3 (three) times a week   tamsulosin (FLOMAX) 0 4 mg   No No   Sig: Take 1 capsule (0 4 mg total) by mouth daily with dinner   torsemide (DEMADEX) 20 mg tablet   No No   Sig: Take 1 tablet (20 mg total) by mouth daily   warfarin (COUMADIN) 5 mg tablet  Self Yes No   Sig: Take by mouth daily Take 2 5 mg (1/2 tablets) daily except Wednesday and Saturday take 5 mg daily        Facility-Administered Medications: None       Past Medical History:   Diagnosis Date    Acute venous embolism and thrombosis of deep vessels of proximal lower extremity (HCC)     Last assessed: 5/18/15    Arthritis     Cellulitis     LE    Chronic kidney disease 3/2020    Acute Kidney Injury    CPAP (continuous positive airway pressure) dependence     Factor V Leiden ([facility] [address] [facility]     Forgetfulness     Gross hematuria 2022    Headache(784 0) 3/2022    Never till cervical  spine surgery    Heart murmur 3/2020    Told when in hospital   [facility] (hard of hearing)     Hypoalbuminemia 5/11/2022    Paroxysmal atrial fibrillation (HCC)     Last assessed: 11/2/15    Sleep apnea        Past Surgical History:   Procedure Laterality Date    BACK SURGERY      Lower    COLON SURGERY      COLONOSCOPY      INCISION AND DRAINAGE POSTERIOR SPINE N/A 4/1/2022    Procedure: Posterior cervical evacuation of postoperative collection and debridement with placement of drains C3-T1; Surgeon: Yasmani Feliz MD;  Location: [facility];  Service: Neurosurgery    IR BIOPSY KIDNEY RANDOM  5/26/2022    IR TUNNELED DIALYSIS CATHETER PLACEMENT  5/23/2022    IR TUNNELED DIALYSIS CATHETER REMOVAL  6/2/2022    SD ARTHRODESIS ANT INTERBODY MIN DISCECTOMY, CERVICAL BELOW C2 N/A 3/11/2022    Procedure: Anterior cervical discectomy and fixation fusion C5/6 and C6/7; Posterior cervical decompression and instrumented fusion C3-T1; Surgeon: Yasmani Feliz MD;  Location: [facility];  Service: Neurosurgery    SPINE SURGERY  3/11/2022    Cervical myelopathy/ cervical fusion       Family History   Problem Relation Age of Onset    Lung cancer Mother     Hearing loss Father     Heart attack Brother     Atrial fibrillation Brother     Emphysema Sister      I have reviewed and agree with the history as documented  E-Cigarette/Vaping    E-Cigarette Use Never User      E-Cigarette/Vaping Substances     Social History     Tobacco Use    Smoking status: Never Smoker    Smokeless tobacco: Never Used   Vaping Use    Vaping Use: Never used   Substance Use Topics    Alcohol use: Not Currently     Alcohol/week: 0 0 standard drinks    Drug use: No       Review of Systems   Constitutional: Positive for fatigue  Negative for chills and fever  HENT: Negative for congestion and rhinorrhea  Eyes: Negative for photophobia and visual disturbance  Respiratory: Negative for cough, shortness of breath and wheezing  Cardiovascular: Positive for leg swelling (chronic, unchanged)  Negative for chest pain and palpitations  Gastrointestinal: Negative for abdominal pain, blood in stool, constipation, diarrhea, nausea and vomiting  Genitourinary: Negative for dysuria, flank pain and hematuria  Musculoskeletal: Negative for back pain and neck pain  Skin: Negative for color change and pallor  Neurological: Positive for weakness (generalized) and light-headedness  Negative for dizziness, numbness and headaches  Physical Exam  Physical Exam  Vitals and nursing note reviewed  Constitutional:       General: He is not in acute distress  Appearance: Normal appearance  He is not ill-appearing, toxic-appearing or diaphoretic  HENT:      Head: Normocephalic and atraumatic  Mouth/Throat:      Mouth: Mucous membranes are moist    Eyes:      Extraocular Movements: Extraocular movements intact  Conjunctiva/sclera: Conjunctivae normal       Pupils: Pupils are equal, round, and reactive to light  Cardiovascular:      Rate and Rhythm: Normal rate and regular rhythm  Pulses: Normal pulses  Heart sounds: Normal heart sounds  No murmur heard  Pulmonary:      Effort: Pulmonary effort is normal  No respiratory distress  Breath sounds: Normal breath sounds  No stridor  No wheezing, rhonchi or rales  Chest:      Chest wall: No tenderness  Abdominal:      General: Bowel sounds are normal  There is no distension  Palpations: Abdomen is soft  Tenderness: There is no abdominal tenderness  There is no guarding or rebound  Musculoskeletal:      Cervical back: Normal range of motion and neck supple  Right lower leg: Edema present  Left lower leg: Edema present  Skin:     General: Skin is warm and dry  Findings: Bruising present  Neurological:      General: No focal deficit present        Mental Status: He is alert and oriented to person, place, and time  Mental status is at baseline     Psychiatric:         Mood and Affect: Mood normal          Behavior: Behavior normal          Vital Signs  ED Triage Vitals   Temperature Pulse Respirations Blood Pressure SpO2   07/19/22 1517 07/19/22 1517 07/19/22 1517 07/19/22 1517 07/19/22 1517   (!) 97 2 °F (36 2 °C) 63 18 114/59 98 %      Temp Source Heart Rate Source Patient Position - Orthostatic VS BP Location FiO2 (%)   07/19/22 1517 07/19/22 1915 07/19/22 1517 07/19/22 1517 --   Temporal Monitor Sitting Left arm       Pain Score       --                  Vitals:    07/19/22 2227 07/19/22 2229 07/19/22 2230 07/19/22 2245   BP: 137/68 167/75 154/76 156/86   Pulse: 57 57 58 56   Patient Position - Orthostatic VS: Standing - Orthostatic VS Standing for 3 minutes - Orthostatic VS           Visual Acuity      ED Medications  Medications - No data to display    Diagnostic Studies  Results Reviewed     Procedure Component Value Units Date/Time    HS Troponin I 2hr [[id number]]  (Normal) Collected: 07/19/22 1906    Lab Status: Final result Specimen: Blood from Arm, Right Updated: 07/19/22 1945     hs TnI 2hr 22 ng/L      Delta 2hr hsTnI -5 ng/L     HS Troponin I 4hr [[id number]]     Lab Status: No result Specimen: Blood     HS Troponin 0hr (reflex protocol) [[id number]]  (Normal) Collected: 07/19/22 1707    Lab Status: Final result Specimen: Blood Updated: 07/19/22 1708     hs TnI 0hr 27 ng/L     Protime-INR [[id number]]  (Abnormal) Collected: 07/19/22 1620    Lab Status: Final result Specimen: Blood from Arm, Right Updated: 07/19/22 1704     Protime 45 9 seconds      INR 4 67    APTT [[id number]]  (Normal) Collected: 07/19/22 1620    Lab Status: Final result Specimen: Blood from Arm, Right Updated: 07/19/22 1704     PTT 34 seconds     CBC and differential [[id number]]  (Abnormal) Collected: 07/19/22 1620    Lab Status: Final result Specimen: Blood from Arm, Right Updated: 07/19/22 8426 WBC 8 78 Thousand/uL      RBC 2 37 Million/uL      Hemoglobin 7 1 g/dL      Hematocrit 21 6 %      MCV 91 fL      MCH 30 0 pg      MCHC 32 9 g/dL      RDW 15 4 %      MPV 9 1 fL      Platelets 920 Thousands/uL      nRBC 0 /100 WBCs      Neutrophils Relative 90 %      Immat GRANS % 2 %      Lymphocytes Relative 5 %      Monocytes Relative 3 %      Eosinophils Relative 0 %      Basophils Relative 0 %      Neutrophils Absolute 7 86 Thousands/µL      Immature Grans Absolute 0 14 Thousand/uL      Lymphocytes Absolute 0 47 Thousands/µL      Monocytes Absolute 0 30 Thousand/µL      Eosinophils Absolute 0 00 Thousand/µL      Basophils Absolute 0 01 Thousands/µL     Narrative: This is an appended report  These results have been appended to a previously verified report      Comprehensive metabolic panel [[id number]]  (Abnormal) Collected: 07/19/22 1620    Lab Status: Final result Specimen: Blood from Arm, Right Updated: 07/19/22 1650     Sodium 134 mmol/L      Potassium 4 9 mmol/L      Chloride 99 mmol/L      CO2 22 mmol/L      ANION GAP 13 mmol/L      BUN 99 mg/dL      Creatinine 4 93 mg/dL      Glucose 146 mg/dL      Calcium 7 8 mg/dL      Corrected Calcium 8 4 mg/dL      AST 10 U/L      ALT 19 U/L      Alkaline Phosphatase 35 U/L      Total Protein 6 5 g/dL      Albumin 3 2 g/dL      Total Bilirubin 0 30 mg/dL      eGFR 10 ml/min/1 73sq m     Narrative:      National Kidney Disease Foundation guidelines for Chronic Kidney Disease (CKD):     Stage 1 with normal or high GFR (GFR > 90 mL/min/1 73 square meters)    Stage 2 Mild CKD (GFR = 60-89 mL/min/1 73 square meters)    Stage 3A Moderate CKD (GFR = 45-59 mL/min/1 73 square meters)    Stage 3B Moderate CKD (GFR = 30-44 mL/min/1 73 square meters)    Stage 4 Severe CKD (GFR = 15-29 mL/min/1 73 square meters)    Stage 5 End Stage CKD (GFR <15 mL/min/1 73 square meters)  Note: GFR calculation is accurate only with a steady state creatinine                 XR chest 1 view portable    (Results Pending)              Procedures  ECG 12 Lead Documentation Only    Date/Time: 7/19/2022 4:29 PM  Performed by: Nataly Lynch DO  Authorized by: Nataly Lynch DO     Indications / Diagnosis:  Lightheadedness  ECG reviewed by me, the ED Provider: yes    Patient location:  ED  Previous ECG:     Previous ECG:  Compared to current    Comparison ECG info:  5/26/22    Similarity:  No change  Interpretation:     Interpretation: non-specific    Rate:     ECG rate:  57    ECG rate assessment: bradycardic    Rhythm:     Rhythm: sinus bradycardia    Ectopy:     Ectopy: none    QRS:     QRS intervals: Wide  Conduction:     Conduction: abnormal      Abnormal conduction: 1st degree and non-specific intraventricular conduction delay    ST segments:     ST segments:  Non-specific  T waves:     T waves: non-specific    Comments:      qtc 478    ECG 12 Lead Documentation Only    Date/Time: 7/20/2022 12:09 AM  Performed by: Nataly Lynch DO  Authorized by: Nataly Lynch DO     Indications / Diagnosis:  Repeat troponin  ECG reviewed by me, the ED Provider: yes    Patient location:  ED  Previous ECG:     Previous ECG:  Compared to current    Comparison ECG info:  3 hours prior    Similarity:  No change    Comparison to cardiac monitor: Yes    Interpretation:     Interpretation: non-specific    Rate:     ECG rate:  63    ECG rate assessment: normal    Rhythm:     Rhythm: sinus rhythm    Ectopy:     Ectopy: none    QRS:     QRS axis:  Normal    QRS intervals:  Normal  Conduction:     Conduction: abnormal      Abnormal conduction: incomplete RBBB and 1st degree    ST segments:     ST segments:  Non-specific  T waves:     T waves: non-specific    Comments:                   ED Course  ED Course as of 07/20/22 0011   e Jul 19, 2022   1650 Creatinine(!): 4 93  Continuing to rise- will discuss with nephrology     1706 POCT INR(!): 4 67   1727 Tigertexted nephrology on call to discuss labs and plan of care  1753 Discussed case with Dr Everardo Pandya, nephrology  If patient feels symptomatically improved s/p unit of PRBCs, can be discharged from nephrology standpoint as kidney function is not significantly changed  1954 Delta 2hr hsTnI: -5   2233 Patient feels improved s/p 1U PRBC  He was able to get up and walk to the bathroom without any lightheadedness  His SBP did drop from laying to standing, but he was asymptomatic  Discussed with patient that his kidney function is slightly worse and that he needs close follow up with Dr Tanya Cruz, nephrology and his PCP  Counseled regarding return precautions which patient and family verbalized understanding of  HEART Risk Score    Flowsheet Row Most Recent Value   Heart Score Risk Calculator    History 0 Filed at: 07/20/2022 0005   ECG 1 Filed at: 07/20/2022 0005   Age 2 Filed at: 07/20/2022 0005   Risk Factors 1 Filed at: 07/20/2022 0005   Troponin 1 Filed at: 07/20/2022 0005   HEART Score 5 Filed at: 07/20/2022 0005                                      MDM  Number of Diagnoses or Management Options  JANEL (acute kidney injury) ([facility] )  Anemia, unspecified type  Elevated troponin  Fatigue  Hypercoagulable state ([facility] )  Diagnosis management comments: Assessment and Plan:   71year old M p/w fatigue/lightheadedness  Fatigue likely secondary to anemia, but will also check orthostatics and ACS workup  Will recheck kidney function and INR as well  If no lab error, will require blood transfusion today for symptomatic anemia  Discussed the following with patient and his spouse:  #1 JANEL on CKD- slightly increased, but within prior range  Follow up with nephrology  #2 anemia- in setting of CKD  Feels improved s/p 1U PRBC  Will need repeat labs outpatient  #3 hypercagulable- hold coumadin, call PCP for further recommendations  #4 elevated troponin- no CP/ ekg unchanged from prior  Could be elevated in setting of JANEL   Discussed with patient that secondary to HEART score 5, will need referral and follow up with cardiology as well and counseled regarding strict return precautions which patient and wife both verbalized understanding of  Disposition  Final diagnoses:   Anemia, unspecified type   Hypercoagulable state ([facility] )   JANEL (acute kidney injury) ([facility] [address] )   Fatigue   Elevated troponin     Time reflects when diagnosis was documented in both MDM as applicable and the Disposition within this note     Time User Action Codes Description Comment    7/19/2022 10:38 PM Elverna Layman Add [D64 9] Anemia, unspecified type     7/19/2022 10:38 PM Dolphus Torito [D68 59] Hypercoagulable state ([facility] [address] )     7/19/2022 10:38 PM Elverna Layman Add [N17 9] JANEL (acute kidney injury) ([facility] [address] )     7/19/2022 10:39 PM Elverna Layman Add [R53 83] Fatigue     7/19/2022 10:39 PM Elverna Layman Add [R77 8] Elevated troponin       ED Disposition     ED Disposition   Discharge    Condition   Stable    Date/Time   Tue Jul 19, 2022 10:38 PM    Comment   Ryann Segal discharge to home/self care  Follow-up Information     Follow up With Specialties Details Why Contact Info Additional Information    Beatriz Alejandra MD [facility] Medicine Schedule an appointment as soon as possible for a visit in 3 days for re-evaluation [address] [address] Cardiology Schedule an appointment as soon as possible for a visit in 3 days for re-evaluation [address] [facility] [id number]  [address]  Cardiology [phone], [address] [facility]    Kyung Oats, DO Nephrology Schedule an appointment as soon as possible for a visit in 2 days for re-evaluation [address]    [address] Emergency Department Emergency Medicine Go to  As needed, If symptoms worsen, for re-evaluation [address] [place],[address] [address]  [address] [facility] Emergency Department, [address] [facility] , [place], [address]          Discharge Medication List as of 7/19/2022 10:41 PM      CONTINUE these medications which have NOT CHANGED    Details   Accu-Chek Softclix Lancets lancets Use as instructed daily to test sugar E11 9, Normal      acetaminophen (TYLENOL) 325 mg tablet Take 3 tablets (975 mg total) by mouth 3 (three) times a day as needed for mild pain, Starting Fri 4/15/2022, No Print      amLODIPine (NORVASC) 10 mg tablet Take 1 tablet (10 mg total) by mouth daily, Starting Tue 6/21/2022, Until Thu 7/21/2022, Normal      Blood Glucose Monitoring Suppl (Accu-Chek Guide) w/Device KIT Starting Sun 6/5/2022, Historical Med      calcium carbonate-vitamin D (OSCAL-D) 500 mg-200 units per tablet Take 1 tablet by mouth daily with breakfast, Starting Sat 6/4/2022, Normal      doxazosin (CARDURA) 1 mg tablet Take 1 tablet (1 mg total) by mouth daily at bedtime, Starting Fri 6/10/2022, Until Tue 7/19/2022, Normal      flecainide (TAMBOCOR) 100 mg tablet TAKE 1 TABLET BY MOUTH  TWICE DAILY, Normal      furosemide (LASIX) 20 mg tablet Starting Tue 6/28/2022, Historical Med      glucose blood (Accu-Chek Guide) test strip USE TO TEST AS DIRECTED, Normal      methocarbamol (ROBAXIN) 750 mg tablet Take 1 tablet (750 mg total) by mouth every 6 (six) hours as needed for muscle spasms, Starting Fri 6/10/2022, Normal      metoprolol succinate (TOPROL-XL) 100 mg 24 hr tablet TAKE 1 TABLET BY MOUTH  DAILY, Normal      ondansetron (ZOFRAN) 4 mg tablet Take 1 tablet (4 mg total) by mouth every 8 (eight) hours as needed for nausea or vomiting, Starting Tue 5/10/2022, Normal      oxyCODONE (ROXICODONE) 5 immediate release tablet Take 1 tablet (5 mg total) by mouth every 6 (six) hours as needed for moderate pain Max Daily Amount: 20 mg, Starting Wed 6/15/2022, Normal      pantoprazole (PROTONIX) 40 mg tablet Take 1 tablet (40 mg total) by mouth 2 (two) times a day before meals, Starting Tue 6/21/2022, Until Mon 9/19/2022, Normal      pravastatin (PRAVACHOL) 40 mg tablet TAKE 1 TABLET BY MOUTH  DAILY, Normal      predniSONE 20 mg tablet 60mg daily for 4 weeks, then 40mg daily x 2 weeks starting June 30th, 2022  Then 30mg daily x 2 weeks, 20mg daily x 2 weeks, 10mg daily x 2 weeks, 5mg daily, No Print      sulfamethoxazole-trimethoprim (BACTRIM DS) 800-160 mg per tablet Take 1 tablet by mouth 3 (three) times a week, Starting Wed 6/22/2022, Until Tue 9/20/2022, Normal      tamsulosin (FLOMAX) 0 4 mg Take 1 capsule (0 4 mg total) by mouth daily with dinner, Starting Wed 6/15/2022, Normal      torsemide (DEMADEX) 20 mg tablet Take 1 tablet (20 mg total) by mouth daily, Starting Tue 6/28/2022, Normal      warfarin (COUMADIN) 5 mg tablet Take by mouth daily Take 2 5 mg (1/2 tablets) daily except Wednesday and Saturday take 5 mg daily  , Historical Med                 PDMP Review       Value Time User    PDMP Reviewed  Yes 6/15/2022  2:12 PM Warren Torres MD          ED Provider  Electronically Signed by           Cira Zhou DO  07/20/22 6812

## 2022-07-19 NOTE — PROGRESS NOTES
NEPHROLOGY OUTPATIENT PROGRESS NOTE   Ty Gurrola 71 y o  male MRN: [id number]  DATE: 7/19/2022  Reason for visit:   Chief Complaint   Patient presents with    Follow-up    Nephropathy        Patient Instructions   1  JANEL d/t IgAN, biopsy proven, in setting of recent MRSA infection  -renal biopsy performed May 26, 20 102 was limited as only 6 intact glomeruli were present for evaluation on light microscopy  IgA dominant immune complex deposition noted by immunofluorescence  Differential includes IgA nephropathy both primary and secondary forms versus infectious associated glomerulonephritis  Staph aureus infections have been associated with IgA dominant immune complex deposition  Patient did have recent MRSA surgical site infection so infection associated GN should be considered   -prednisone 60 mg daily begun June 2nd   -on Bactrim 3 times weekly for PJP prophylaxis   -on Protonix 40 mg twice daily for GI prophylaxis  -continue oscal D for bone protection on high dose steroids  -continue daily prednisone 60mg for now  -monitor weekly bloodwork and urine testing  -if this was a primary IgA nephropathy, it would be compatible with [place] classification of M0 E1 S0 T1-C1   -of 38 glomeruli, 6 were intact, forehead global glomerulosclerosis, segmental sclerosis was absent  Moderate interstitial fibrosis and tubular atrophy as well as arterial intimal fibrosis and mild arterolar hyalinosis were noted  -did not require dialysis inpatient, permacath removed prior to discharge  -sCr now 4 18 and stable, slowly improving from 5 34  -BUN elevated to 89  No uremic signs/symptoms to warrant HD initiation    -as the patient has had an acute onset of rapidly progressive glomerulonephritis with normal complement levels and deposition of mesangial IgA, 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  -will continue prednisone 20mg x 3 weeks, then drop to 10mg x 3 weeks   To be seen again within 1 month  -may need to consider rituxan infusion in light of podocytopathy       2  Hyponatremia, hypercalcemia - resolved     3  Hyperphosphatemia - phos 5 3 as of 7/18/22, monitor phos monthly  Will hold off on restarting Renagel 800 mg 3 times daily with meals for now  Likely d/t decreased excretion in setting of JANEL  Should follow low phos diet  4  Proteinuria - UpCr in setting of IgAN shows UpCr improved from 12 4g to 2g as of 7/11/22      5  HTN - BP well controlled, continue metoprolol 100 mg daily, Cardura 1 mg at bedtime, amlodipine 10 mg daily, flomax     6  Anemia - Hgb 7 5 and stable as of 7/11/22, monitor CBC weekly     7  LE edema likely d/t nephrotic syndrome - continue torsemide 20mg daily, will monitor K/SCr weekly  Monitor daily weight/BP readings  Will continue weekly CMP, CBC, Urinalysis and urine protein:Cr and monthly phos  Continue current prophylaxis and 20mg prednisone daily  Continue torsemide 20mg daily for edema/nephrotic syndrome  RTC in 1 month  Brenda Dorado was seen today for follow-up and nephropathy  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    Chronic diastolic (congestive) heart failure (HCC)    Essential hypertension    Microscopic hematuria    Anemia, unspecified type    Hyperphosphatemia  -     Phosphorus; Future  -     Phosphorus    Lower extremity edema    Other proteinuria        Assessment/Plan:  1  JANEL d/t IgAN, biopsy proven, in setting of recent MRSA infection  -renal biopsy performed May 26, 20 102 was limited as only 6 intact glomeruli were present for evaluation on light microscopy  IgA dominant immune complex deposition noted by immunofluorescence  Differential includes IgA nephropathy both primary and secondary forms versus infectious associated glomerulonephritis  Staph aureus infections have been associated with IgA dominant immune complex deposition    Patient did have recent MRSA surgical site infection so infection associated GN should be considered   -prednisone 60 mg daily begun June 2nd   -on Bactrim 3 times weekly for PJP prophylaxis   -on Protonix 40 mg twice daily for GI prophylaxis  -continue oscal D for bone protection on high dose steroids  -continue daily prednisone 60mg for now  -monitor weekly bloodwork and urine testing  -if this was a primary IgA nephropathy, it would be compatible with [place] classification of M0 E1 S0 T1-C1   -of 38 glomeruli, 6 were intact, forehead global glomerulosclerosis, segmental sclerosis was absent  Moderate interstitial fibrosis and tubular atrophy as well as arterial intimal fibrosis and mild arterolar hyalinosis were noted  -did not require dialysis inpatient, permacath removed prior to discharge  -sCr now 4 18 and stable, slowly improving from 5 34  -BUN elevated to 89  No uremic signs/symptoms to warrant HD initiation    -as the patient has had an acute onset of rapidly progressive glomerulonephritis with normal complement levels and deposition of mesangial IgA, 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  -will continue prednisone 20mg x 3 weeks, then drop to 10mg x 3 weeks  To be seen again within 1 month  -may need to consider rituxan infusion in light of podocytopathy       2  Hyponatremia, hypercalcemia - resolved     3  Hyperphosphatemia - phos 5 3 as of 7/18/22, monitor phos monthly  Will hold off on restarting Renagel 800 mg 3 times daily with meals for now  Likely d/t decreased excretion in setting of JANEL  Should follow low phos diet       4  Proteinuria - UpCr in setting of IgAN shows UpCr improved from 12 4g to 2g as of 7/11/22      5  HTN - BP well controlled, continue metoprolol 100 mg daily, Cardura 1 mg at bedtime, amlodipine 10 mg daily, flomax     6  Anemia - Hgb 7 5 and stable as of 7/11/22, monitor CBC weekly     7   LE edema likely d/t nephrotic syndrome - continue torsemide 20mg daily, will monitor K/SCr weekly  Monitor daily weight/BP readings      Will continue weekly CMP, CBC, Urinalysis and urine protein:Cr and monthly phos  Continue current prophylaxis and 20mg prednisone daily  Continue torsemide 20mg daily for edema/nephrotic syndrome  RTC in 1 month  SUBJECTIVE / INTERVAL HISTORY:  71 y o  male presents in follow up of Adriana  Faina Salguero denies any recent illness/hospitalizations/medication changes since last office visit  Denies NSAID use  Weight fluctuates 227lbs to 235lbs  Usually around 230lbs  Still has LE edema but less than before  Still has weeping  Tops of feet swell, more on right  Has no strength in his legs and feels weak  He walks and it does not help  He has a short temper at home, he thinks because of prednisone  He is tired of being sick  Feels terrible  Has had fatigue and sleeps a lot  Has had nausea as of yesterday  Does urinate with torsemide  Review of Systems   Constitutional: Negative for chills and fever  HENT: Negative for sore throat  Eyes: Negative for visual disturbance  Respiratory: Negative for cough and shortness of breath  Cardiovascular: Negative for chest pain and leg swelling  Gastrointestinal: Negative for abdominal pain, constipation, diarrhea, nausea and vomiting  Endocrine: Negative for polyuria  Genitourinary: Negative for decreased urine volume, difficulty urinating and dysuria  Musculoskeletal: Negative for back pain and myalgias  Skin: Negative for rash  Neurological: Negative for light-headedness and numbness  Psychiatric/Behavioral: Negative for confusion  OBJECTIVE:  /64 (BP Location: Left arm, Patient Position: Sitting, Cuff Size: Standard)   Pulse 61   Ht 5' 11" (1 803 m)   Wt 107 kg (236 lb)   BMI 32 92 kg/m²  Body mass index is 32 92 kg/m²  Physical exam:  Physical Exam  Vitals reviewed  Constitutional:       General: He is not in acute distress       Appearance: Normal appearance  He is well-developed  He is not diaphoretic  HENT:      Head: Normocephalic and atraumatic  Nose: Nose normal    Eyes:      General: No scleral icterus  Right eye: No discharge  Left eye: No discharge  Neck:      Thyroid: No thyromegaly  Cardiovascular:      Rate and Rhythm: Normal rate and regular rhythm  Heart sounds: Normal heart sounds  Pulmonary:      Effort: Pulmonary effort is normal       Breath sounds: Normal breath sounds  No wheezing or rales  Abdominal:      General: Bowel sounds are normal  There is no distension  Palpations: Abdomen is soft  Tenderness: There is no abdominal tenderness  Musculoskeletal:         General: Swelling (b/l pitting edema) present  Cervical back: Neck supple  Lymphadenopathy:      Cervical: No cervical adenopathy  Skin:     General: Skin is warm and dry  Coloration: Skin is not jaundiced  Findings: Bruising (b/l arms) present  No rash  Neurological:      General: No focal deficit present  Mental Status: He is alert        Comments: awake   Psychiatric:         Mood and Affect: Mood normal          Behavior: Behavior normal          Medications:    Current Outpatient Medications:     Accu-Chek Softclix Lancets lancets, Use as instructed daily to test sugar E11 9, Disp: 200 each, Rfl: 2    acetaminophen (TYLENOL) 325 mg tablet, Take 3 tablets (975 mg total) by mouth 3 (three) times a day as needed for mild pain, Disp: , Rfl: 0    amLODIPine (NORVASC) 10 mg tablet, Take 1 tablet (10 mg total) by mouth daily, Disp: 90 tablet, Rfl: 3    Blood Glucose Monitoring Suppl (Accu-Chek Guide) w/Device KIT, , Disp: , Rfl:     calcium carbonate-vitamin D (OSCAL-D) 500 mg-200 units per tablet, Take 1 tablet by mouth daily with breakfast, Disp: 30 tablet, Rfl: 0    doxazosin (CARDURA) 1 mg tablet, Take 1 tablet (1 mg total) by mouth daily at bedtime, Disp: 30 tablet, Rfl: 0    flecainide (TAMBOCOR) 100 mg tablet, TAKE 1 TABLET BY MOUTH  TWICE DAILY, Disp: 180 tablet, Rfl: 1    glucose blood (Accu-Chek Guide) test strip, USE TO TEST AS DIRECTED, Disp: 50 strip, Rfl: 2    methocarbamol (ROBAXIN) 750 mg tablet, Take 1 tablet (750 mg total) by mouth every 6 (six) hours as needed for muscle spasms, Disp: 30 tablet, Rfl: 0    metoprolol succinate (TOPROL-XL) 100 mg 24 hr tablet, TAKE 1 TABLET BY MOUTH  DAILY, Disp: 90 tablet, Rfl: 0    ondansetron (ZOFRAN) 4 mg tablet, Take 1 tablet (4 mg total) by mouth every 8 (eight) hours as needed for nausea or vomiting, Disp: 30 tablet, Rfl: 0    oxyCODONE (ROXICODONE) 5 immediate release tablet, Take 1 tablet (5 mg total) by mouth every 6 (six) hours as needed for moderate pain Max Daily Amount: 20 mg, Disp: 10 tablet, Rfl: 0    pantoprazole (PROTONIX) 40 mg tablet, Take 1 tablet (40 mg total) by mouth 2 (two) times a day before meals, Disp: 180 tablet, Rfl: 3    pravastatin (PRAVACHOL) 40 mg tablet, TAKE 1 TABLET BY MOUTH  DAILY, Disp: 90 tablet, Rfl: 1    predniSONE 20 mg tablet, 60mg daily for 4 weeks, then 40mg daily x 2 weeks starting June 30th, 2022  Then 30mg daily x 2 weeks, 20mg daily x 2 weeks, 10mg daily x 2 weeks, 5mg daily, Disp: 270 tablet, Rfl: 0    sulfamethoxazole-trimethoprim (BACTRIM DS) 800-160 mg per tablet, Take 1 tablet by mouth 3 (three) times a week, Disp: 36 tablet, Rfl: 3    tamsulosin (FLOMAX) 0 4 mg, Take 1 capsule (0 4 mg total) by mouth daily with dinner, Disp: 90 capsule, Rfl: 1    torsemide (DEMADEX) 20 mg tablet, Take 1 tablet (20 mg total) by mouth daily, Disp: 90 tablet, Rfl: 1    warfarin (COUMADIN) 5 mg tablet, Take by mouth daily Take 2 5 mg (1/2 tablets) daily except Wednesday and Saturday take 5 mg daily  , Disp: , Rfl:     gabapentin (NEURONTIN) 300 mg capsule, Take 1 capsule (300 mg total) by mouth daily at bedtime (Patient not taking: Reported on 7/19/2022), Disp: 90 capsule, Rfl: 3    Allergies:   Allergies as of 07/19/2022 - Reviewed 07/19/2022   Allergen Reaction Noted    Morphine GI Intolerance 09/11/2018    Vitamin k Other (See Comments) 12/28/2020       The following portions of the patient's history were reviewed and updated as appropriate: past family history, past surgical history and problem list     Laboratory Results:  Lab Results   Component Value Date    SODIUM 134 07/11/2022    K 5 2 07/11/2022    CL 98 07/11/2022    CO2 21 07/11/2022    BUN 89 (HH) 07/11/2022    CREATININE 4 18 (H) 07/11/2022    GLUC 176 (H) 07/11/2022    CALCIUM 8 3 06/04/2022        Lab Results   Component Value Date    CALCIUM 8 3 06/04/2022    PHOS 5 5 (H) 06/03/2022       Portions of the record may have been created with voice recognition software   Occasional wrong word or "sound a like" substitutions may have occurred due to the inherent limitations of voice recognition software   Read the chart carefully and recognize, using context, where substitutions have occurred

## 2022-07-20 ENCOUNTER — TELEPHONE (OUTPATIENT)
Dept: CARDIOLOGY CLINIC | Facility: CLINIC | Age: 72
End: 2022-07-20

## 2022-07-20 ENCOUNTER — ANTICOAG VISIT (OUTPATIENT)
Dept: FAMILY MEDICINE CLINIC | Facility: CLINIC | Age: 72
End: 2022-07-20

## 2022-07-20 ENCOUNTER — TELEPHONE (OUTPATIENT)
Dept: FAMILY MEDICINE CLINIC | Facility: CLINIC | Age: 72
End: 2022-07-20

## 2022-07-20 ENCOUNTER — TELEPHONE (OUTPATIENT)
Dept: NEPHROLOGY | Facility: CLINIC | Age: 72
End: 2022-07-20

## 2022-07-20 LAB
ABO GROUP BLD BPU: NORMAL
ATRIAL RATE: 57 BPM
ATRIAL RATE: 63 BPM
BPU ID: NORMAL
CROSSMATCH: NORMAL
P AXIS: 59 DEGREES
P AXIS: 76 DEGREES
PR INTERVAL: 226 MS
PR INTERVAL: 230 MS
QRS AXIS: 78 DEGREES
QRS AXIS: 81 DEGREES
QRSD INTERVAL: 162 MS
QRSD INTERVAL: 206 MS
QT INTERVAL: 492 MS
QT INTERVAL: 498 MS
QTC INTERVAL: 478 MS
QTC INTERVAL: 509 MS
T WAVE AXIS: -14 DEGREES
T WAVE AXIS: 21 DEGREES
UNIT DISPENSE STATUS: NORMAL
UNIT PRODUCT CODE: NORMAL
UNIT PRODUCT VOLUME: 350 ML
UNIT RH: NORMAL
VENTRICULAR RATE: 57 BPM
VENTRICULAR RATE: 63 BPM

## 2022-07-20 PROCEDURE — 93010 ELECTROCARDIOGRAM REPORT: CPT | Performed by: INTERNAL MEDICINE

## 2022-07-20 NOTE — TELEPHONE ENCOUNTER
Pt has hospital follow up appt w/Dr Porsha Barclay on 8/4/22  Pt was just seen in ER yesterday and advised to see Dr Porsha Barclay sooner than scheduled appt  Pt has scheduled vacation next week  Pt was hoping doctor could review chart and advise if sooner appt is required  Thank you

## 2022-07-20 NOTE — TELEPHONE ENCOUNTER
Patient's wife called and stated per the hospital discharge papers the patient should follow up with Dr Kennedy Parkinson in two days  Patient's wife would like to know if he needs to seen again  Also stated that he usually gets blood work done on Mondays but is going away for a week starting this Saturday  Patient's wife would like to know if he should also have any repeat labs done and if the patient should go for labs Friday before they leave

## 2022-07-20 NOTE — DISCHARGE INSTRUCTIONS
Your INR today is 4 57  Hold your next dose of coumadin and call your PCP for further instructions tomorrow  Your kidney function is slightly worse today, but still within the realm of your normal   Please call your nephrologist to follow-up  Your troponin which is a heart enzyme, was elevated today which could be in the setting of worsening anemia and kidney function, but as you have been feeling lightheaded and fatigued, I would like for you to follow-up with your primary care doctor and Cardiology  Please return to the emergency department for the following, not limited to chest pain, shortness of breath, palpitations, lightheadedness, dizziness

## 2022-07-20 NOTE — TELEPHONE ENCOUNTER
Simón Thompson aware to HOLD X 2 days and check Friday morning  Advised to go to the hospital to have labs drawn but she doesn't want to b/c they will be charged unless they go to [facility]  Aware Dr Florian Askew works sat morning  They are leaving for vacation on Saturday and asked that Dr Florian Askew call Elsy's Cell number with INR results/instructions       Robin FRIAS# [phone]    Task to PM to monitor for Sat morning for INR Results

## 2022-07-20 NOTE — TELEPHONE ENCOUNTER
Patient requesting PT INR instruction had labs completed  07/19/2022  Patient stated had labs jose on 07/18/2022 an instruction was to hold medication  on 07/19/2022  But labs redrawn 07/19/2022      Please review advised

## 2022-07-21 ENCOUNTER — APPOINTMENT (OUTPATIENT)
Dept: PHYSICAL THERAPY | Facility: CLINIC | Age: 72
End: 2022-07-21
Payer: COMMERCIAL

## 2022-07-21 NOTE — TELEPHONE ENCOUNTER
Is he willing to come in next week for an appointment at [facility]? I wrote all week but I can bring him over the office since the office is attached to the hospital   The best time for me with the Wednesday next week early afternoon appointment  Thank you

## 2022-07-21 NOTE — TELEPHONE ENCOUNTER
Left message with patient advising that per Dr Covarrubias he should have weekly labs drawn tomorrow before leaving on vacation

## 2022-07-23 ENCOUNTER — TELEPHONE (OUTPATIENT)
Dept: FAMILY MEDICINE CLINIC | Facility: CLINIC | Age: 72
End: 2022-07-23

## 2022-07-23 DIAGNOSIS — D68.51 FACTOR V LEIDEN MUTATION (HCC): Primary | ICD-10-CM

## 2022-07-23 LAB — PHOSPHATE SERPL-MCNC: 4.4 MG/DL (ref 2.8–4.1)

## 2022-07-25 ENCOUNTER — APPOINTMENT (OUTPATIENT)
Dept: PHYSICAL THERAPY | Facility: CLINIC | Age: 72
End: 2022-07-25
Payer: COMMERCIAL

## 2022-07-25 ENCOUNTER — TELEPHONE (OUTPATIENT)
Dept: NEPHROLOGY | Facility: CLINIC | Age: 72
End: 2022-07-25

## 2022-07-25 ENCOUNTER — TELEPHONE (OUTPATIENT)
Dept: FAMILY MEDICINE CLINIC | Facility: CLINIC | Age: 72
End: 2022-07-25

## 2022-07-25 ENCOUNTER — ANTICOAG VISIT (OUTPATIENT)
Dept: FAMILY MEDICINE CLINIC | Facility: CLINIC | Age: 72
End: 2022-07-25

## 2022-07-25 NOTE — TELEPHONE ENCOUNTER
----- Message from Hedy Hillman DO sent at 7/25/2022  3:11 PM EDT -----  Urine protein:cr slightly higher at 1 6g but sCr improved to 4 59  He should have ongoing weekly bloodwork  Also let the patient know the phosphorus has improved to 4 4  No need for medications to control phosphorus at this time  Thanks

## 2022-07-26 ENCOUNTER — PATIENT OUTREACH (OUTPATIENT)
Dept: FAMILY MEDICINE CLINIC | Facility: CLINIC | Age: 72
End: 2022-07-26

## 2022-07-26 NOTE — PROGRESS NOTES
Outpatient Care Management Note:    Chart reviewed for outreach purposes  Patient is on vacation this week  CM will follow up next week  He did schedule his cardiology and nephrology follow up appts and completed his nephrology blood work

## 2022-08-01 ENCOUNTER — APPOINTMENT (OUTPATIENT)
Dept: PHYSICAL THERAPY | Facility: CLINIC | Age: 72
End: 2022-08-01
Payer: COMMERCIAL

## 2022-08-01 ENCOUNTER — ANTICOAG VISIT (OUTPATIENT)
Dept: FAMILY MEDICINE CLINIC | Facility: CLINIC | Age: 72
End: 2022-08-01

## 2022-08-01 DIAGNOSIS — G95.9 CERVICAL MYELOPATHY (HCC): Primary | ICD-10-CM

## 2022-08-01 DIAGNOSIS — I50.32 CHRONIC DIASTOLIC (CONGESTIVE) HEART FAILURE (HCC): ICD-10-CM

## 2022-08-01 RX ORDER — DULOXETIN HYDROCHLORIDE 60 MG/1
60 CAPSULE, DELAYED RELEASE ORAL DAILY
Qty: 90 CAPSULE | Refills: 1 | Status: SHIPPED | OUTPATIENT
Start: 2022-08-01 | End: 2022-08-04 | Stop reason: SDUPTHER

## 2022-08-01 RX ORDER — DOXAZOSIN MESYLATE 1 MG/1
1 TABLET ORAL
Qty: 90 TABLET | Refills: 1 | Status: SHIPPED | OUTPATIENT
Start: 2022-08-01 | End: 2022-08-04 | Stop reason: SDUPTHER

## 2022-08-01 RX ORDER — DOXAZOSIN MESYLATE 1 MG/1
1 TABLET ORAL
Qty: 30 TABLET | Refills: 5 | Status: SHIPPED | OUTPATIENT
Start: 2022-08-01 | End: 2022-08-01 | Stop reason: SDUPTHER

## 2022-08-02 ENCOUNTER — PATIENT OUTREACH (OUTPATIENT)
Dept: FAMILY MEDICINE CLINIC | Facility: CLINIC | Age: 72
End: 2022-08-02

## 2022-08-02 NOTE — PROGRESS NOTES
Outpatient Care Management Note:    Voice mail message left for Shelley Vanessa and his wife, Anna Silva, with my contact information, requesting a call back

## 2022-08-03 ENCOUNTER — OFFICE VISIT (OUTPATIENT)
Dept: PHYSICAL THERAPY | Facility: CLINIC | Age: 72
End: 2022-08-03
Payer: COMMERCIAL

## 2022-08-03 DIAGNOSIS — M17.0 PRIMARY OSTEOARTHRITIS OF BOTH KNEES: Primary | ICD-10-CM

## 2022-08-03 PROCEDURE — 97110 THERAPEUTIC EXERCISES: CPT | Performed by: PHYSICAL THERAPIST

## 2022-08-03 PROCEDURE — 97162 PT EVAL MOD COMPLEX 30 MIN: CPT | Performed by: PHYSICAL THERAPIST

## 2022-08-03 NOTE — PROGRESS NOTES
PT Evaluation     Today's date: 8/3/2022  Patient name: Julio Weiss  : 1950  MRN: [id number]  Referring provider: Courtney Fitzpatrick PT  Dx: No diagnosis found  Assessment  Assessment details: Julio Weiss is a 71 y o  male presenting to outpatient physical therapy with chief complaints of (B) Knee pain and poor balance  Patient describes gradual decline in LE strength with inactivity over the past several months  Patient displays with abnormal gait, good knee AROM, (B) hip and knee strength deficits, (+) varus/ valgus instability on (R), poor balance, and functional restrictions  Patient's symptoms are multifactorial in nature with a primary movement diagnosis of poor motor control resulting in pathoanatomical signs and symptoms consistent with Primary osteoarthritis of both knees  (primary encounter diagnosis) resulting in limitations in his ability to walk without pain, exercise appropriately without pain  No further referral appears necessary at this time based upon examination results  Please contact me if you have any questions ([phone])  Thank you for the opportunity to share in the care of this patient  Impairments: abnormal gait, abnormal or restricted ROM, activity intolerance, impaired balance, impaired physical strength, lacks appropriate home exercise program and pain with function    Symptom irritability: highUnderstanding of Dx/Px/POC: fair   Prognosis: fair  Prognosis details: Positive prognostic indicators include positive attitude toward recovery, motivated to improve  Negative prognostic indicators include chronicity of symptoms, unrealistic expectations  Goals  STG to be met in 2 weeks:  - Increase (B) Hip and Knee strength by 1/2 MMT grade  - Improve SL balance to 5 seconds  - I with HEP   - Patient will be able to walk over 1/4 mile without pain  LTG to be met in 4 weeks:  - Increase (B) Hip and Knee strength to 4-4+/5 all planes    - Improve SL balance to 10 seconds  - I with updated HEP   - Patient will be able to engage in progressive exercise program for general fitness  Plan  Plan details: Prognosis above is given PT services 2x/week tapering to 1x/week over the next 4 weeks and home program adherence  Patient would benefit from: skilled physical therapy  Planned modality interventions: cryotherapy and thermotherapy: hydrocollator packs  Planned therapy interventions: joint mobilization, manual therapy, neuromuscular re-education, patient education, strengthening, stretching, therapeutic activities, therapeutic exercise, home exercise program, body mechanics training, activity modification, functional ROM exercises, gait training and balance  Plan of Care beginning date: 8/3/2022  Plan of Care expiration date: 2022  Treatment plan discussed with: patient        Subjective Evaluation    History of Present Illness  Mechanism of injury: At Evaluation (August 3, 2022): Patient reports chronic (B) knee pain for many years  He has experienced a gradual decline in LE strength over the past several months  He notes feeling exhausted most of the time with relatively minimal activity  He recently had neck surgery and recovered well - discussed LE strength issues with his PT and requested an evaluation  Red Flag Screen:  Patient denies recent fever, changes in bowel and bladder function, nausea and vomiting, unexplained weight loss, tingling, numbness, pain with coughing, or traumatic ALYSON   Patient reports generalized LE weakness         Greatest concern: declining function and fear of falling    Quality of life: good    Pain  Current pain ratin  At best pain ratin  At worst pain ratin  Location: (B) Knee  Quality: dull ache and sharp  Alleviating factors: Activity modification, sitting  Symptom course: Standing, Walking, Stair negotiation      Social Support  Steps to enter house: yes  Stairs in house: yes   Lives in: multiple-level home  Lives with: spouse    Employment status: not working    Diagnostic Tests    FCE comments: No recent imaging Treatments  Previous treatment: physical therapy, medication and injection treatment  Patient Goals  Patient goal: Patient Specific Functional Scale: improve confidence with walking 0/10, Improve LE strength 0/10, learn exercises to improve endurance and activity level 0/10; Total 0/30        Objective     Static Posture   General Observations  Symmetrical weight bearing  Palpation     Additional Palpation Details  TTP (B) Anterior knee    Active Range of Motion   Left Knee   Flexion: 120 degrees   Extension: 0 degrees     Right Knee   Flexion: 125 degrees   Extension: 0 degrees     Additional Active Range of Motion Details  No increased pain with (B) flexion and extension OP    Strength/Myotome Testing     Left Hip   Planes of Motion   Flexion: 4-  Extension: 3  Abduction: 3-    Right Hip   Planes of Motion   Flexion: 4-  Extension: 3-  Abduction: 3-    Left Knee   Flexion: 4+  Extension: 4    Right Knee   Flexion: 4+  Extension: 4-    Tests     Left Knee   Negative anterior drawer, posterior drawer, valgus stress test at 0 degrees, valgus stress test at 30 degrees, varus stress test at 0 degrees and varus stress test at 30 degrees  Right Knee   Positive valgus stress test at 0 degrees, valgus stress test at 30 degrees, varus stress test at 0 degrees and varus stress test at 30 degrees  Negative anterior drawer and posterior drawer  Additional Tests Details  Hypermobility noted with varus/ valgus stress tests on (R) knee - no pain reported     Ambulation     Observational Gait   Gait: antalgic   Decreased walking speed, left step length and right step length       Additional Observational Gait Details  FW Walker    Functional Assessment        Comments  5x sit to stand: 16 seconds  TU 9 seconds with FW Walker    Romberg Balance: EO: 30 sec no sway; EC: 5 sec moderate sway  Sharpened Romberg Balance: (R) Foot FWD 2 sec moderate sway; (L) Foot FWD 2 sec moderate sway                  Daily Treatment Diary     DX: (B) Knee OA  EPOC: 9/2/22  Follow Up with Referring Provider: AVINASH  Precautions: NA  CO-MORBIDITIES: A-fib, Hx of DVT  HEP ACCESS CODE: [id number]     Stage: chronic   Stability of Symptoms:  At Evaluation: stable - unchanged  Global Rating of Change:   Symptom Irritability Level: high  Primary Movement Diagnosis: poor motor control   Patient Specific Functional Scale:   8/3/22: improve confidence with walking 0/10, Improve LE strength 0/10, learn exercises to improve endurance and activity level 0/10;  Total 0/30  FOTO Prediction: 34 by visit 15  FOTO Progress: 8 at evaluation   Greatest Concern: declining function and fear of falling  Current Activity Plan: added to HEP (8/3)  Current Educational Needs: progressions, safety of activity       Treatment Diary          Manual Therapy                                                                        Therapeutic Exercise  HEP         Recumbent Bike                    Standing Hip Flex 8/3         Standing Hip ABD 8/3         Standing Hip Ext 8/3         HS Curls 8/3                   LAQ          SLR          TB H/L Hip ABD          Bridge                    Neuromuscular Reeducation                    Romberg Balance with Reach Matrix                    Foam Side stepping                    Sharpened Romberg Balance                    FWD Mini Lunge          LAT Mini Lunge                              Therapeutic Activity                              Gait Training                                        Modalities

## 2022-08-04 ENCOUNTER — APPOINTMENT (OUTPATIENT)
Dept: PHYSICAL THERAPY | Facility: CLINIC | Age: 72
End: 2022-08-04
Payer: COMMERCIAL

## 2022-08-04 ENCOUNTER — OFFICE VISIT (OUTPATIENT)
Dept: CARDIOLOGY CLINIC | Facility: CLINIC | Age: 72
End: 2022-08-04
Payer: COMMERCIAL

## 2022-08-04 VITALS
BODY MASS INDEX: 33.88 KG/M2 | SYSTOLIC BLOOD PRESSURE: 112 MMHG | DIASTOLIC BLOOD PRESSURE: 58 MMHG | HEIGHT: 71 IN | HEART RATE: 56 BPM | WEIGHT: 242 LBS

## 2022-08-04 DIAGNOSIS — I10 ESSENTIAL HYPERTENSION: ICD-10-CM

## 2022-08-04 DIAGNOSIS — G95.9 CERVICAL MYELOPATHY (HCC): ICD-10-CM

## 2022-08-04 DIAGNOSIS — N05.9 GLOMERULONEPHRITIS: ICD-10-CM

## 2022-08-04 DIAGNOSIS — I50.32 CHRONIC DIASTOLIC (CONGESTIVE) HEART FAILURE (HCC): ICD-10-CM

## 2022-08-04 DIAGNOSIS — I48.91 ATRIAL FIBRILLATION, UNSPECIFIED TYPE (HCC): ICD-10-CM

## 2022-08-04 DIAGNOSIS — G47.33 OSA (OBSTRUCTIVE SLEEP APNEA): ICD-10-CM

## 2022-08-04 DIAGNOSIS — N02.8 IGA NEPHROPATHY DETERMINED BY BIOPSY OF KIDNEY: ICD-10-CM

## 2022-08-04 DIAGNOSIS — I48.0 PAROXYSMAL ATRIAL FIBRILLATION (HCC): Primary | ICD-10-CM

## 2022-08-04 DIAGNOSIS — D68.51 FACTOR V LEIDEN MUTATION (HCC): ICD-10-CM

## 2022-08-04 DIAGNOSIS — E78.2 MIXED HYPERLIPIDEMIA: ICD-10-CM

## 2022-08-04 DIAGNOSIS — Z98.890 STATUS POST CATHETER ABLATION OF ATRIAL FLUTTER: ICD-10-CM

## 2022-08-04 PROCEDURE — 99214 OFFICE O/P EST MOD 30 MIN: CPT | Performed by: INTERNAL MEDICINE

## 2022-08-04 PROCEDURE — 93000 ELECTROCARDIOGRAM COMPLETE: CPT | Performed by: INTERNAL MEDICINE

## 2022-08-04 RX ORDER — FLECAINIDE ACETATE 50 MG/1
50 TABLET ORAL 2 TIMES DAILY
Qty: 180 TABLET | Refills: 3 | Status: SHIPPED | OUTPATIENT
Start: 2022-08-04

## 2022-08-04 RX ORDER — DOXAZOSIN MESYLATE 1 MG/1
1 TABLET ORAL
Qty: 90 TABLET | Refills: 1 | Status: SHIPPED | OUTPATIENT
Start: 2022-08-04 | End: 2022-10-28 | Stop reason: SDUPTHER

## 2022-08-04 RX ORDER — DULOXETIN HYDROCHLORIDE 60 MG/1
60 CAPSULE, DELAYED RELEASE ORAL DAILY
Qty: 90 CAPSULE | Refills: 1 | Status: SHIPPED | OUTPATIENT
Start: 2022-08-04 | End: 2022-10-28 | Stop reason: SDUPTHER

## 2022-08-04 RX ORDER — AMLODIPINE BESYLATE 5 MG/1
5 TABLET ORAL DAILY
Qty: 90 TABLET | Refills: 3 | Status: SHIPPED | OUTPATIENT
Start: 2022-08-04 | End: 2022-10-06

## 2022-08-04 NOTE — TELEPHONE ENCOUNTER
Patient is requesting medication sent to [facility] [phone] instead of the [facility] delivery

## 2022-08-04 NOTE — PATIENT INSTRUCTIONS
Low-Sodium Diet   AMBULATORY CARE:   A low-sodium diet  limits foods that are high in sodium (salt)  You will need to follow a low-sodium diet if you have high blood pressure, kidney disease, or heart failure  You may also need to follow this diet if you have a condition that is causing your body to retain (hold) extra fluid  You may need to limit the amount of sodium you eat in a day to 1,500 to 2,000 mg  Ask your healthcare provider how much sodium you can have each day  How to use food labels to choose foods that are low in sodium:  Read food labels to find the amount of sodium they contain  The amount of sodium is listed in milligrams (mg)  The % Daily Value (DV) column tells you how much of your daily needs are met by 1 serving of the food for each nutrient listed  Choose foods that have less than 5% of the DV of sodium  These foods are considered low in sodium  Foods that have 20% or more of the DV of sodium are considered high in sodium  Some food labels may also list any of the following terms that tell you about the sodium content in the food:  Sodium-free:  Less than 5 mg in each serving    Very low sodium:  35 mg of sodium or less in each serving    Low sodium:  140 mg of sodium or less in each serving    Reduced sodium: At least 25% less sodium in each serving than the regular type    Light in sodium:  50% less sodium in each serving    Unsalted or no added salt:  No extra salt is added during processing (the food may still contain sodium)       Foods to avoid:  Salty foods are high in sodium   You should avoid the following:  Processed foods:      Mixes for cornbread, biscuits, cake, and pudding     Instant foods, such as potatoes, cereals, noodles, and rice     Packaged foods, such as bread stuffing, rice and pasta mixes, snack dip mixes, and macaroni and cheese     Canned foods, such as canned vegetables, soups, broths, sauces, and vegetable or tomato juice    Snack foods, such as salted chips, popcorn, pretzels, pork rinds, salted crackers, and salted nuts    Frozen foods, such as dinners, entrees, vegetables with sauces, and breaded meats    Sauerkraut, pickled vegetables, and other foods prepared in brine    Meats and cheeses:      Smoked or cured meat, such as corned beef, march, ham, hot dogs, and sausage    Canned meats or spreads, such as potted meats, sardines, anchovies, and imitation seafood    Deli or lunch meats, such as bologna, ham, turkey, and roast beef    Processed cheese, such as American cheese and cheese spreads    Condiments, sauces, and seasonings:      Salt (¼ teaspoon of salt contains 575 mg of sodium)    Seasonings made with salt, such as garlic salt, celery salt, onion salt, and seasoned salt    Regular soy sauce, barbecue sauce, teriyaki sauce, steak sauce, Worcestershire sauce, and most flavored vinegars    Canned gravy and mixes     Regular condiments, such as mustard, ketchup, and salad dressings    Pickles and olives    Meat tenderizers and monosodium glutamate (MSG)    Foods to include:  Read the food label to find the exact amount of sodium in each serving  Bread and cereal:  Try to choose breads with less than 80 mg of sodium per serving  Bread, roll, lorenzo, tortilla, or unsalted crackers  Ready-to-eat cereals with less than 5% DV of sodium (examples include shredded wheat and puffed rice)    Pasta    Vegetables and fruits:      Unsalted fresh, frozen, or canned vegetables    Fresh, frozen, or canned fruits    Fruit juice    Dairy:  One serving has about 150 mg of sodium  Milk, all types    Yogurt    Hard cheese, such as cheddar, Swiss, [employer], or [employer] and other protein foods:  Some raw meats may have added sodium       Plain meats, fish, and poultry     Eggs    Other foods:      Homemade pudding    Unsalted nuts, popcorn, or pretzels    Unsalted butter or margarine    Ways to decrease sodium:   Add spices and herbs to foods instead of salt during cooking  Use salt-free seasonings to add flavor to foods  Examples include onion powder, garlic powder, basil, jorge powder, paprika, and parsley  Try lemon or lime juice or vinegar to give foods a tart flavor  Use hot peppers, pepper, or cayenne pepper to add a spicy flavor  Do not keep a salt shaker at your kitchen table  This may help keep you from adding salt to food at the table  A teaspoon of salt has 2,300 mg of sodium  It may take time to get used to enjoying the natural flavor of food instead of adding salt  Talk to your healthcare provider before you use salt substitutes  Some salt substitutes have a high amount of potassium and need to be avoided if you have kidney disease  Choose low-sodium foods at restaurants  Meals from restaurants are often high in sodium  Some restaurants have nutrition information on the menu that tells you the amount of sodium in their foods  If possible, ask for your food to be prepared with less, or no salt  Shop for unsalted or low-sodium foods and snacks at the grocery store  Examples include unsalted or low-sodium broths, soups, and canned vegetables  Choose fresh or frozen vegetables instead  Choose unsalted nuts or seeds or fresh fruits or vegetables as snacks  Read food labels and choose salt-free, very low-sodium, or low-sodium foods  © Copyright [address] 2022 Information is for End User's use only and may not be sold, redistributed or otherwise used for commercial purposes  All illustrations and images included in CareNotes® are the copyrighted property of [employer]  or [place] Justine Garibay   The above information is an  only  It is not intended as medical advice for individual conditions or treatments  Talk to your doctor, nurse or pharmacist before following any medical regimen to see if it is safe and effective for you

## 2022-08-04 NOTE — PROGRESS NOTES
Cardiology Follow Up    Kev Butt  1950  [id number]  HEART & VASCULAR  [facility] [place] [address]  [id number] [place] [address] Drive [address]    1  Paroxysmal atrial fibrillation (HCC)  POCT ECG   2  Chronic diastolic (congestive) heart failure [facility])  Ambulatory Referral to Cardiology   3  Essential hypertension  amLODIPine (NORVASC) 5 mg tablet   4  WILL (obstructive sleep apnea)     5  Factor V Leiden mutation ([address] )     6  Glomerulonephritis     7  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     8  Status post catheter ablation of atrial flutter     9  Mixed hyperlipidemia         Discussion/Summary:    1  PAF - Celia Miller remains in sinus rhythm  Because of his worsening chronic kidney disease 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 IgA nephropathy he is on too high a dose of flecainide  We will decrease this to 50 mg twice daily and we will follow blood work closely  He will be back to see us in 3 months for an ECG  He is on warfarin for stroke prevention given this and his factor 5 Leiden mutation and DVT history  2  Obstructive sleep apnea -  He is now on and tolerating CPAP  It is encouraged for him to remain compliant on this  3   Hypertension - His blood pressure is under good control  Given his edema we are going to decrease his amlodipine to 5 mg daily  He should periodically check his blood pressure at home  4   Dyslipidemia - He has been compliant on pravastatin  With changing his diet his LDL did improve back to where it had been in the past   No changes made today and he will periodically have blood work followed  5   Chronic heart failure with preserved ejection fraction - This has worsened and is associated with his worsening chronic kidney disease  He is on torsemide and I will allow Nephrology to comment on any dosing changes  He is volume overloaded today  Obstructive sleep apnea is also contributing to this    He should follow a low-sodium diet and watch his weight  We will see him back in 3 months  Interval History:     Colletta Able returns for followup given his history of paroxysmal atrial fibrillation  This was diagnosed at the time of a colonoscopy, performed by Dr Benjie Leonardo a few years ago  After starting metoprolol, in follow-up he was back to sinus rhythm  Then in January 2015 Colletta Able arrived in atrial flutter with 2:1 conduction  He however has no symptoms other than is ongoing fatigue  At that time I set him up for a cardioversion that next week  He converted without any problems  He then met Dr Cesar Baez of our EP service  He then came back a week later and had his atrial flutter ablated  He was in sinus rhythm up until a few appointments ago in which he was found to be back in atrial fibrillation, with controlled rates  He has been on Coumadin for years given his history of a DVT and being found to have factor 5 Leiden mutation-homozygous  When we 1st met him, he also went for a sleep study, and was found to have severe obstructive sleep apnea  He however did not tolerate the CPAP mask due to claustrophobia  I encouraged him to seek consultation with sleep medicine, and for a while he did not do this  However after last year he did do this  He then had proper mask fitting, and now is on CPAP  He has been compliant on this since  For a while there Colletta Able remained in atrial fibrillation, and did complain of some fatigue and some shortness of breath /diaphoresis on exertion  He overall felt well, but we discussed a rhythm control strategy  He we set him up for a cardioversion which was only transiently successful  Within the month he was back in atrial fibrillation  We had him undergo stress nuclear study which was normal   At that point the thought was another cardioversion, and then start flecainide  When he arrived for his cardioversion in December 2018 he converted on his own back to sinus rhythm    He remains in sinus rhythm today on flecainide and metoprolol  Bj Larios has had a few setbacks in his health over the last 6 months  He has been in the hospital a few times  He did develop cervical spine disease/myelopathy  He has gone through surgery for this  He also developed worsening signs of CHF and kidney disease  His creatinine has been hovering in the 4-4 5 range  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 but then he did also showed have IgA nephropathy proven by biopsy  He has been following with Nephrology closely and they have had discussions of hemodialysis  Bj Larios has been fatigued in trying to recover with physical therapy  His stamina is very low at this point because of his extensive hospitalization  He has chronic lower extremity edema  He denies chest pains or any symptoms of angina  He does remain in sinus rhythm  No palpitations  No lightheadedness or syncope  No orthopnea or PND        Past Medical History:   Diagnosis Date    Acute venous embolism and thrombosis of deep vessels of proximal lower extremity (HCC)     Last assessed: 5/18/15    Arthritis     Cellulitis     LE    Chronic kidney disease 3/2020    Acute Kidney Injury    CPAP (continuous positive airway pressure) dependence     Factor V Leiden ([address] )     Forgetfulness     Gross hematuria 5/17/2022    Headache(784 0) 3/2022    Never till cervical  spine surgery    Heart murmur 3/2020    Told when in hospital   [facility] (hard of hearing)     Hypoalbuminemia 5/11/2022    Paroxysmal atrial fibrillation (HCC)     Last assessed: 11/2/15    Sleep apnea      Social History     Socioeconomic History    Marital status: /Civil Union     Spouse name: Ruby Coreas Number of children: 3    Years of education: Not on file    Highest education level: Not on file   Occupational History    Occupation: Retired   Tobacco Use    Smoking status: Never Smoker    Smokeless tobacco: Never Used   Vaping Use    Vaping Use: Never used   Substance and Sexual Activity    Alcohol use: Not Currently     Alcohol/week: 0 0 standard drinks    Drug use: No    Sexual activity: Not Currently     Partners: Female   Other Topics Concern    Not on file   Social History Narrative    Caffeine use: 2 cups coffee a day     Social Determinants of Health     Financial Resource Strain: Not on file   Food Insecurity: No Food Insecurity    Worried About Running Out of Food in the Last Year: Never true    Kacy of Food in the Last Year: Never true   Transportation Needs: No Transportation Needs    Lack of Transportation (Medical): No    Lack of Transportation (Non-Medical): No   Physical Activity: Not on file   Stress: Not on file   Social Connections: Not on file   Intimate Partner Violence: Not on file   Housing Stability: Low Risk     Unable to Pay for Housing in the Last Year: No    Number of Places Lived in the Last Year: 1    Unstable Housing in the Last Year: No      Family History   Problem Relation Age of Onset    Lung cancer Mother     Hearing loss Father     Heart attack Brother     Atrial fibrillation Brother     Emphysema Sister      Past Surgical History:   Procedure Laterality Date    BACK SURGERY      Lower    COLON SURGERY      COLONOSCOPY      INCISION AND DRAINAGE POSTERIOR SPINE N/A 4/1/2022    Procedure: Posterior cervical evacuation of postoperative collection and debridement with placement of drains C3-T1; Surgeon: Elio Rhodes MD;  [facility]: [facility];  Service: Neurosurgery    IR BIOPSY KIDNEY RANDOM  5/26/2022    IR TUNNELED DIALYSIS CATHETER PLACEMENT  5/23/2022    IR TUNNELED DIALYSIS CATHETER REMOVAL  6/2/2022    IN ARTHRODESIS ANT INTERBODY MIN DISCECTOMY, CERVICAL BELOW C2 N/A 3/11/2022    Procedure: Anterior cervical discectomy and fixation fusion C5/6 and C6/7; Posterior cervical decompression and instrumented fusion C3-T1;   Surgeon: Elio Rhodes MD;  [facility]: [facility]; Service: Neurosurgery    SPINE SURGERY  3/11/2022    Cervical myelopathy/ cervical fusion       Current Outpatient Medications:     Accu-Chek Softclix Lancets lancets, Use as instructed daily to test sugar E11 9, Disp: 200 each, Rfl: 2    acetaminophen (TYLENOL) 325 mg tablet, Take 3 tablets (975 mg total) by mouth 3 (three) times a day as needed for mild pain, Disp: , Rfl: 0    amLODIPine (NORVASC) 5 mg tablet, Take 1 tablet (5 mg total) by mouth daily, Disp: 90 tablet, Rfl: 3    Blood Glucose Monitoring Suppl (Accu-Chek Guide) w/Device KIT, , Disp: , Rfl:     calcium carbonate-vitamin D (OSCAL-D) 500 mg-200 units per tablet, Take 1 tablet by mouth daily with breakfast, Disp: 30 tablet, Rfl: 0    doxazosin (CARDURA) 1 mg tablet, Take 1 tablet (1 mg total) by mouth daily at bedtime, Disp: 90 tablet, Rfl: 1    DULoxetine (CYMBALTA) 60 mg delayed release capsule, Take 1 capsule (60 mg total) by mouth daily, Disp: 90 capsule, Rfl: 1    flecainide (TAMBOCOR) 100 mg tablet, TAKE 1 TABLET BY MOUTH  TWICE DAILY, Disp: 180 tablet, Rfl: 1    glucose blood (Accu-Chek Guide) test strip, USE TO TEST AS DIRECTED, Disp: 50 strip, Rfl: 2    methocarbamol (ROBAXIN) 750 mg tablet, Take 1 tablet (750 mg total) by mouth every 6 (six) hours as needed for muscle spasms, Disp: 30 tablet, Rfl: 0    metoprolol succinate (TOPROL-XL) 100 mg 24 hr tablet, TAKE 1 TABLET BY MOUTH  DAILY, Disp: 90 tablet, Rfl: 0    ondansetron (ZOFRAN) 4 mg tablet, Take 1 tablet (4 mg total) by mouth every 8 (eight) hours as needed for nausea or vomiting, Disp: 30 tablet, Rfl: 0    oxyCODONE (ROXICODONE) 5 immediate release tablet, Take 1 tablet (5 mg total) by mouth every 6 (six) hours as needed for moderate pain Max Daily Amount: 20 mg, Disp: 10 tablet, Rfl: 0    pantoprazole (PROTONIX) 40 mg tablet, Take 1 tablet (40 mg total) by mouth 2 (two) times a day before meals, Disp: 180 tablet, Rfl: 3    pravastatin (PRAVACHOL) 40 mg tablet, TAKE 1 TABLET BY MOUTH  DAILY, Disp: 90 tablet, Rfl: 1    predniSONE 20 mg tablet, 60mg daily for 4 weeks, then 40mg daily x 2 weeks starting June 30th, 2022  Then 30mg daily x 2 weeks, 20mg daily x 2 weeks, 10mg daily x 2 weeks, 5mg daily, Disp: 270 tablet, Rfl: 0    sulfamethoxazole-trimethoprim (BACTRIM DS) 800-160 mg per tablet, Take 1 tablet by mouth 3 (three) times a week, Disp: 36 tablet, Rfl: 3    tamsulosin (FLOMAX) 0 4 mg, Take 1 capsule (0 4 mg total) by mouth daily with dinner, Disp: 90 capsule, Rfl: 1    torsemide (DEMADEX) 20 mg tablet, Take 1 tablet (20 mg total) by mouth daily, Disp: 90 tablet, Rfl: 1    warfarin (COUMADIN) 5 mg tablet, Take by mouth daily Take 2 5 mg (1/2 tablets) daily except Wednesday and Saturday take 5 mg daily  , Disp: , Rfl:   Allergies   Allergen Reactions    Morphine GI Intolerance    Vitamin K Other (See Comments)     On coumadin-patient sensitive to vitamin K amounts in meds etc        Labs:  Lab Results   Component Value Date     10/25/2017    K 4 9 07/30/2022     07/30/2022    CO2 23 07/30/2022    BUN 88 (HH) 07/30/2022    CREATININE 4 24 (H) 07/30/2022    CREATININE 4 93 (H) 07/19/2022    CREATININE 1 24 10/25/2017    GLUCOSE 102 (H) 10/25/2017    CALCIUM 7 8 (L) 07/19/2022    CALCIUM 9 6 10/25/2017     Lab Results   Component Value Date    WBC 9 0 07/30/2022    WBC 8 78 07/19/2022    WBC 8 43 01/23/2015    HGB 7 5 (L) 07/30/2022    HGB 7 1 (L) 07/19/2022    HGB 12 9 01/23/2015    HCT 22 7 (L) 07/30/2022    HCT 21 6 (L) 07/19/2022    HCT 39 5 01/23/2015    MCV 91 07/30/2022    MCV 91 07/19/2022    MCV 93 01/23/2015     07/30/2022     07/19/2022     01/23/2015     Lab Results   Component Value Date    CHOL 151 10/25/2017    TRIG 164 (H) 03/05/2022    TRIG 181 (H) 11/02/2021    HDL 50 03/05/2022    HDL 47 11/02/2021     Imaging:   ECG obtained today demonstrates sinus bradycardia, first-degree AV block with a right bundle branch block     Review of Systems:  Review of Systems   Constitutional: Positive for fatigue  HENT: Negative  Eyes: Negative  Respiratory: Positive for shortness of breath  Cardiovascular: Positive for leg swelling  Gastrointestinal: Negative  Musculoskeletal: Negative  Skin: Negative  Allergic/Immunologic: Negative  Neurological: Negative  Hematological: Negative  Psychiatric/Behavioral: Negative  All other systems reviewed and are negative  Vitals:    08/04/22 1112   BP: 112/58   BP Location: Right arm   Patient Position: Sitting   Cuff Size: Standard   Pulse: 56   Weight: 110 kg (242 lb)   Height: 5' 11" (1 803 m)       Physical Exam:  Physical Exam  Vitals and nursing note reviewed  Constitutional:       Appearance: He is well-developed  HENT:      Head: Normocephalic and atraumatic  Eyes:      General: No scleral icterus  Right eye: No discharge  Left eye: No discharge  Pupils: Pupils are equal, round, and reactive to light  Neck:      Thyroid: No thyromegaly  Vascular: No JVD  Cardiovascular:      Rate and Rhythm: Normal rate and regular rhythm  Pulses: Normal pulses  Heart sounds: S1 normal and S2 normal  Heart sounds are distant  No murmur heard  No friction rub  No gallop  Pulmonary:      Effort: Pulmonary effort is normal  No respiratory distress  Breath sounds: Normal breath sounds  No wheezing or rales  Abdominal:      General: Bowel sounds are normal  There is no distension  Palpations: Abdomen is soft  Tenderness: There is no abdominal tenderness  Musculoskeletal:         General: No tenderness or deformity  Normal range of motion  Cervical back: Normal range of motion and neck supple  Skin:     General: Skin is warm and dry  Findings: No rash  Neurological:      Mental Status: He is alert and oriented to person, place, and time  Cranial Nerves: No cranial nerve deficit     Psychiatric: Thought Content: Thought content normal          Judgment: Judgment normal        Counseling / Coordination of Care  Total floor / unit time spent today 25 minutes  Greater than 50% of total time was spent with the patient and / or family counseling and / or coordination of care

## 2022-08-08 ENCOUNTER — PATIENT OUTREACH (OUTPATIENT)
Dept: FAMILY MEDICINE CLINIC | Facility: CLINIC | Age: 72
End: 2022-08-08

## 2022-08-08 NOTE — PROGRESS NOTES
Outpatient Care Management Note:    Voice mail message left for Mary Pack, with my contact information, requesting a call back  Unable to reach letter mailed to patient

## 2022-08-08 NOTE — LETTER
Date: 08/08/22    Dear Jonel Delong,   My name is Chet Miranda  I am a registered nurse care manager working with  [id number]   I have not been able to reach you and would like to set a time that I can talk with you over the phone  My work is to help patients that have complex medical conditions get the care they need  This includes patients who may have been in the hospital or emergency room  Please call me with any questions you may have, or if I can be of any further assistance  I look forward to speaking with you    Sincerely,  Chet Romerot  [phone]  Outpatient Care Manager  Copy:  (primary care physician name and address)

## 2022-08-09 ENCOUNTER — TELEPHONE (OUTPATIENT)
Dept: OBGYN CLINIC | Facility: HOSPITAL | Age: 72
End: 2022-08-09

## 2022-08-09 NOTE — TELEPHONE ENCOUNTER
Patient was last seen by you on 02/24 of this year  He is wanted to get the surgery now but wants to know if he needs to come see you first and discuss everything all over again? Or can he just be placed in the schedule for surgery  Pt would like a call back

## 2022-08-10 ENCOUNTER — TELEPHONE (OUTPATIENT)
Dept: NEPHROLOGY | Facility: CLINIC | Age: 72
End: 2022-08-10

## 2022-08-10 DIAGNOSIS — N17.9 AKI (ACUTE KIDNEY INJURY) (HCC): Primary | ICD-10-CM

## 2022-08-10 DIAGNOSIS — R80.8 OTHER PROTEINURIA: ICD-10-CM

## 2022-08-10 DIAGNOSIS — I10 ESSENTIAL HYPERTENSION: ICD-10-CM

## 2022-08-10 DIAGNOSIS — E87.1 HYPONATREMIA: ICD-10-CM

## 2022-08-10 DIAGNOSIS — I48.91 ATRIAL FIBRILLATION, UNSPECIFIED TYPE (HCC): ICD-10-CM

## 2022-08-10 RX ORDER — METOPROLOL SUCCINATE 100 MG/1
TABLET, EXTENDED RELEASE ORAL
Qty: 90 TABLET | Refills: 0 | Status: SHIPPED | OUTPATIENT
Start: 2022-08-10

## 2022-08-10 NOTE — TELEPHONE ENCOUNTER
----- Message from Daphnie Sales DO sent at 8/10/2022  9:12 AM EDT -----  BUN and sCr improving although proteinuria remains elevated  Patient should have repeat BMP, Ua and UpCr prior to appt with me on 8/18/22  Thanks

## 2022-08-10 NOTE — TELEPHONE ENCOUNTER
Lvm informing pt of lab results per Dr Leslie Abreu BUN and sCr level is improving however proteinuria remains elevated  Lab scripts mailed to address on file

## 2022-08-11 ENCOUNTER — ANTICOAG VISIT (OUTPATIENT)
Dept: FAMILY MEDICINE CLINIC | Facility: CLINIC | Age: 72
End: 2022-08-11

## 2022-08-11 ENCOUNTER — APPOINTMENT (OUTPATIENT)
Dept: PHYSICAL THERAPY | Facility: CLINIC | Age: 72
End: 2022-08-11
Payer: COMMERCIAL

## 2022-08-11 NOTE — PROGRESS NOTES
Lauren Palma MD  P [facility]  same     Message left to continue same 2 5mg Daily and recheck in 2 weeks     Any questions to call office

## 2022-08-15 ENCOUNTER — OFFICE VISIT (OUTPATIENT)
Dept: FAMILY MEDICINE CLINIC | Facility: CLINIC | Age: 72
End: 2022-08-15
Payer: COMMERCIAL

## 2022-08-15 VITALS
BODY MASS INDEX: 34.58 KG/M2 | WEIGHT: 247 LBS | OXYGEN SATURATION: 97 % | TEMPERATURE: 97.2 F | SYSTOLIC BLOOD PRESSURE: 158 MMHG | HEART RATE: 65 BPM | HEIGHT: 71 IN | DIASTOLIC BLOOD PRESSURE: 66 MMHG

## 2022-08-15 DIAGNOSIS — N17.9 AKI (ACUTE KIDNEY INJURY) (HCC): ICD-10-CM

## 2022-08-15 DIAGNOSIS — F40.298 FEAR OF SIDE EFFECTS OF MEDICATION: Primary | ICD-10-CM

## 2022-08-15 DIAGNOSIS — I48.0 PAROXYSMAL A-FIB (HCC): ICD-10-CM

## 2022-08-15 DIAGNOSIS — I50.32 CHRONIC DIASTOLIC (CONGESTIVE) HEART FAILURE (HCC): ICD-10-CM

## 2022-08-15 PROCEDURE — 3078F DIAST BP <80 MM HG: CPT | Performed by: NURSE PRACTITIONER

## 2022-08-15 PROCEDURE — 3077F SYST BP >= 140 MM HG: CPT | Performed by: NURSE PRACTITIONER

## 2022-08-15 PROCEDURE — 99214 OFFICE O/P EST MOD 30 MIN: CPT | Performed by: NURSE PRACTITIONER

## 2022-08-15 NOTE — PROGRESS NOTES
[facility]        NAME: Enrique العراقي is a 71 y o  male  : 1950    MRN: [id number]  DATE: August 15, 2022  TIME: 1:01 PM    Assessment and Plan   Fear of side effects of medication [F40 298]  1  Fear of side effects of medication     2  JANEL (acute kidney injury) ([address] )     3  Chronic diastolic (congestive) heart failure (HCC)     4  Paroxysmal A-fib [facility])           Patient Instructions     Patient Instructions   F/up with nephrology as scheduled  F/up with Neurosurgeon/nephrology regarding Bactrim  Continue physical therapy  Schedule appointment with Dr Keshawn Mast for Medicare wellness which is due 22  Call with any problems/concerns  Chief Complaint     Chief Complaint   Patient presents with    Medication Reaction     Pt is in the office c/o adverse reaction to his antibiotics  Pt is using sulfamethoxazole and Trimethoprim Tablets, 1 tab 3xweek  Pt developed purple spots on his arms, sensitivity to sunlight (must wear sunglasses), dizzy, lightheadedness  Pt is not currently checking blood sugar at home  Pt requests refill on oxy, to use as needed  Lab - [facility]         History of Present Illness       Patient has been taking Bactrim since March due to a post-op infection  Patient states since taking the medication he has increased purple spots on his arms and is sensitive to light  Patient has a hx of Afib, CHF, Chronic coagulation-on Coumadin  He is followed by cardiology and was seen last week and is to f/up in 3 months  Denies chest pain, no respiratory distress  Hx of JANEL-followed by nephrology  Patients wife states the Bactrim was prescribed post-op for infection, but was also told it was to be taken for prophylaxis due to being on high doses of prednisone  She states Prednisone dose has been decreased  He has an appointment scheduled with nephrology this Thursday   Due to his extensive hospitalization he has been going to physical therapy to increase his strength and stamina  Review of Systems   Review of Systems   Constitutional: Negative for activity change and fever  Respiratory: Negative for chest tightness, shortness of breath and wheezing  Cardiovascular: Negative for chest pain  Gastrointestinal: Negative for abdominal pain  Musculoskeletal: Negative for arthralgias  Skin: Positive for color change  bruising   Neurological: Positive for weakness (generalized)  Negative for dizziness, facial asymmetry, numbness and headaches  Hematological: Bruises/bleeds easily           Current Medications       Current Outpatient Medications:     acetaminophen (TYLENOL) 325 mg tablet, Take 3 tablets (975 mg total) by mouth 3 (three) times a day as needed for mild pain, Disp: , Rfl: 0    amLODIPine (NORVASC) 5 mg tablet, Take 1 tablet (5 mg total) by mouth daily, Disp: 90 tablet, Rfl: 3    calcium carbonate-vitamin D (OSCAL-D) 500 mg-200 units per tablet, Take 1 tablet by mouth daily with breakfast, Disp: 30 tablet, Rfl: 0    doxazosin (CARDURA) 1 mg tablet, Take 1 tablet (1 mg total) by mouth daily at bedtime, Disp: 90 tablet, Rfl: 1    DULoxetine (CYMBALTA) 60 mg delayed release capsule, Take 1 capsule (60 mg total) by mouth daily, Disp: 90 capsule, Rfl: 1    flecainide (TAMBOCOR) 50 mg tablet, Take 1 tablet (50 mg total) by mouth 2 (two) times a day, Disp: 180 tablet, Rfl: 3    methocarbamol (ROBAXIN) 750 mg tablet, Take 1 tablet (750 mg total) by mouth every 6 (six) hours as needed for muscle spasms, Disp: 30 tablet, Rfl: 0    metoprolol succinate (TOPROL-XL) 100 mg 24 hr tablet, TAKE 1 TABLET BY MOUTH  DAILY, Disp: 90 tablet, Rfl: 0    ondansetron (ZOFRAN) 4 mg tablet, Take 1 tablet (4 mg total) by mouth every 8 (eight) hours as needed for nausea or vomiting, Disp: 30 tablet, Rfl: 0    oxyCODONE (ROXICODONE) 5 immediate release tablet, Take 1 tablet (5 mg total) by mouth every 6 (six) hours as needed for moderate pain Max Daily Amount: 20 mg, Disp: 10 tablet, Rfl: 0    pantoprazole (PROTONIX) 40 mg tablet, Take 1 tablet (40 mg total) by mouth 2 (two) times a day before meals, Disp: 180 tablet, Rfl: 3    pravastatin (PRAVACHOL) 40 mg tablet, TAKE 1 TABLET BY MOUTH  DAILY, Disp: 90 tablet, Rfl: 1    predniSONE 20 mg tablet, 60mg daily for 4 weeks, then 40mg daily x 2 weeks starting June 30th, 2022  Then 30mg daily x 2 weeks, 20mg daily x 2 weeks, 10mg daily x 2 weeks, 5mg daily, Disp: 270 tablet, Rfl: 0    sulfamethoxazole-trimethoprim (BACTRIM DS) 800-160 mg per tablet, Take 1 tablet by mouth 3 (three) times a week, Disp: 36 tablet, Rfl: 3    tamsulosin (FLOMAX) 0 4 mg, Take 1 capsule (0 4 mg total) by mouth daily with dinner, Disp: 90 capsule, Rfl: 1    torsemide (DEMADEX) 20 mg tablet, Take 1 tablet (20 mg total) by mouth daily, Disp: 90 tablet, Rfl: 1    warfarin (COUMADIN) 5 mg tablet, Take by mouth daily Take 2 5 mg (1/2 tablets) daily except Wednesday and Saturday take 5 mg daily  , Disp: , Rfl:     Accu-Chek Softclix Lancets lancets, Use as instructed daily to test sugar E11 9 (Patient not taking: Reported on 8/15/2022), Disp: 200 each, Rfl: 2    Blood Glucose Monitoring Suppl (Accu-Chek Guide) w/Device KIT, , Disp: , Rfl:     glucose blood (Accu-Chek Guide) test strip, USE TO TEST AS DIRECTED (Patient not taking: Reported on 8/15/2022), Disp: 50 strip, Rfl: 2    Current Allergies     Allergies as of 08/15/2022 - Reviewed 08/15/2022   Allergen Reaction Noted    Morphine GI Intolerance 09/11/2018    Vitamin k Other (See Comments) 12/28/2020            The following portions of the patient's history were reviewed and updated as appropriate: allergies, current medications, past family history, past medical history, past social history, past surgical history and problem list      Past Medical History:   Diagnosis Date    Acute venous embolism and thrombosis of deep vessels of proximal lower extremity ([facility] [address] )     Last assessed: 5/18/15    Arthritis     Cellulitis     LE    Chronic kidney disease 3/2020    Acute Kidney Injury    CPAP (continuous positive airway pressure) dependence     Factor V Leiden ([address] )     Forgetfulness     Gross hematuria 5/17/2022    Headache(784 0) 3/2022    Never till cervical  spine surgery    Heart murmur 3/2020    Told when in hospital   [facility] (hard of hearing)     Hypoalbuminemia 5/11/2022    Paroxysmal atrial fibrillation (HCC)     Last assessed: 11/2/15    Sleep apnea        Past Surgical History:   Procedure Laterality Date    BACK SURGERY      Lower    COLON SURGERY      COLONOSCOPY      INCISION AND DRAINAGE POSTERIOR SPINE N/A 4/1/2022    Procedure: Posterior cervical evacuation of postoperative collection and debridement with placement of drains C3-T1; Surgeon: Colby Beck MD;  [facility];  Service: Neurosurgery    IR BIOPSY KIDNEY RANDOM  5/26/2022    IR TUNNELED DIALYSIS CATHETER PLACEMENT  5/23/2022    IR TUNNELED DIALYSIS CATHETER REMOVAL  6/2/2022    SD ARTHRODESIS ANT INTERBODY MIN DISCECTOMY, CERVICAL BELOW C2 N/A 3/11/2022    Procedure: Anterior cervical discectomy and fixation fusion C5/6 and C6/7; Posterior cervical decompression and instrumented fusion C3-T1; Surgeon: Colby Beck MD;  [facility];  Service: Neurosurgery    SPINE SURGERY  3/11/2022    Cervical myelopathy/ cervical fusion       Family History   Problem Relation Age of Onset    Lung cancer Mother     Hearing loss Father     Heart attack Brother     Atrial fibrillation Brother     Emphysema Sister          Medications have been verified  Objective   /66 (BP Location: Left arm, Patient Position: Sitting, Cuff Size: Large)   Pulse 65   Temp (!) 97 2 °F (36 2 °C) (Temporal)   Ht 5' 11" (1 803 m)   Wt 112 kg (247 lb)   SpO2 97%   BMI 34 45 kg/m²        Physical Exam     Physical Exam  Constitutional:       General: He is not in acute distress  Appearance: Normal appearance  He is not ill-appearing, toxic-appearing or diaphoretic  HENT:      Head: Normocephalic  Cardiovascular:      Rate and Rhythm: Normal rate and regular rhythm  Heart sounds: Normal heart sounds  No murmur heard  No friction rub  No gallop  Pulmonary:      Effort: Pulmonary effort is normal  No respiratory distress  Breath sounds: Normal breath sounds  No wheezing  Chest:      Chest wall: No tenderness  Musculoskeletal:      Comments: Ambulating with walker  Skin:     Findings: Bruising present  Comments: Multiple bruises noted b/l arms-   Neurological:      Mental Status: He is alert and oriented to person, place, and time  Psychiatric:         Mood and Affect: Mood normal          Behavior: Behavior normal          Thought Content:  Thought content normal          Judgment: Judgment normal

## 2022-08-15 NOTE — PATIENT INSTRUCTIONS
F/up with nephrology as scheduled  F/up with Neurosurgeon/nephrology regarding Bactrim  Continue physical therapy  Schedule appointment with Dr Na Barnes for Medicare wellness which is due 11/4/22  Call with any problems/concerns

## 2022-08-16 ENCOUNTER — HOSPITAL ENCOUNTER (EMERGENCY)
Facility: HOSPITAL | Age: 72
Discharge: HOME | End: 2022-08-16
Attending: EMERGENCY MEDICINE
Payer: COMMERCIAL

## 2022-08-16 ENCOUNTER — TELEPHONE (OUTPATIENT)
Dept: NEPHROLOGY | Facility: HOSPITAL | Age: 72
End: 2022-08-16

## 2022-08-16 VITALS
RESPIRATION RATE: 18 BRPM | TEMPERATURE: 98 F | DIASTOLIC BLOOD PRESSURE: 91 MMHG | BODY MASS INDEX: 33.6 KG/M2 | WEIGHT: 240 LBS | OXYGEN SATURATION: 97 % | SYSTOLIC BLOOD PRESSURE: 169 MMHG | HEIGHT: 71 IN | HEART RATE: 59 BPM

## 2022-08-16 DIAGNOSIS — Z51.89 ENCOUNTER FOR BLOOD TRANSFUSION: ICD-10-CM

## 2022-08-16 DIAGNOSIS — D63.8 ANEMIA OF CHRONIC DISEASE: ICD-10-CM

## 2022-08-16 DIAGNOSIS — N18.9 CHRONIC KIDNEY DISEASE: Primary | ICD-10-CM

## 2022-08-16 LAB
ABO GROUP BLD: NORMAL
ANION GAP SERPL CALCULATED.3IONS-SCNC: 11 MMOL/L (ref 4–13)
BASOPHILS # BLD AUTO: 0.02 THOUSANDS/ΜL (ref 0–0.1)
BASOPHILS NFR BLD AUTO: 0 % (ref 0–1)
BLD GP AB SCN SERPL QL: NEGATIVE
BUN SERPL-MCNC: 67 MG/DL (ref 5–25)
CALCIUM SERPL-MCNC: 8.2 MG/DL (ref 8.3–10.1)
CHLORIDE SERPL-SCNC: 103 MMOL/L (ref 96–108)
CO2 SERPL-SCNC: 25 MMOL/L (ref 21–32)
CREAT SERPL-MCNC: 4.12 MG/DL (ref 0.6–1.3)
EOSINOPHIL # BLD AUTO: 0.01 THOUSAND/ΜL (ref 0–0.61)
EOSINOPHIL NFR BLD AUTO: 0 % (ref 0–6)
ERYTHROCYTE [DISTWIDTH] IN BLOOD BY AUTOMATED COUNT: 14.6 % (ref 11.6–15.1)
GFR SERPL CREATININE-BSD FRML MDRD: 13 ML/MIN/1.73SQ M
GLUCOSE SERPL-MCNC: 177 MG/DL (ref 65–140)
HCT VFR BLD AUTO: 20.4 % (ref 36.5–49.3)
HGB BLD-MCNC: 6.6 G/DL (ref 12–17)
IMM GRANULOCYTES # BLD AUTO: 0.09 THOUSAND/UL (ref 0–0.2)
IMM GRANULOCYTES NFR BLD AUTO: 1 % (ref 0–2)
LYMPHOCYTES # BLD AUTO: 0.73 THOUSANDS/ΜL (ref 0.6–4.47)
LYMPHOCYTES NFR BLD AUTO: 11 % (ref 14–44)
MCH RBC QN AUTO: 30.3 PG (ref 26.8–34.3)
MCHC RBC AUTO-ENTMCNC: 32.4 G/DL (ref 31.4–37.4)
MCV RBC AUTO: 94 FL (ref 82–98)
MONOCYTES # BLD AUTO: 0.42 THOUSAND/ΜL (ref 0.17–1.22)
MONOCYTES NFR BLD AUTO: 6 % (ref 4–12)
NEUTROPHILS # BLD AUTO: 5.29 THOUSANDS/ΜL (ref 1.85–7.62)
NEUTS SEG NFR BLD AUTO: 82 % (ref 43–75)
NRBC BLD AUTO-RTO: 0 /100 WBCS
PLATELET # BLD AUTO: 193 THOUSANDS/UL (ref 149–390)
PMV BLD AUTO: 9.7 FL (ref 8.9–12.7)
POTASSIUM SERPL-SCNC: 4.9 MMOL/L (ref 3.5–5.3)
RBC # BLD AUTO: 2.18 MILLION/UL (ref 3.88–5.62)
RH BLD: POSITIVE
SODIUM SERPL-SCNC: 139 MMOL/L (ref 135–147)
SPECIMEN EXPIRATION DATE: NORMAL
WBC # BLD AUTO: 6.56 THOUSAND/UL (ref 4.31–10.16)

## 2022-08-16 PROCEDURE — 86900 BLOOD TYPING SEROLOGIC ABO: CPT | Performed by: PHYSICIAN ASSISTANT

## 2022-08-16 PROCEDURE — 86901 BLOOD TYPING SEROLOGIC RH(D): CPT | Performed by: PHYSICIAN ASSISTANT

## 2022-08-16 PROCEDURE — 99285 EMERGENCY DEPT VISIT HI MDM: CPT | Performed by: PHYSICIAN ASSISTANT

## 2022-08-16 PROCEDURE — 99283 EMERGENCY DEPT VISIT LOW MDM: CPT

## 2022-08-16 PROCEDURE — 36430 TRANSFUSION BLD/BLD COMPNT: CPT

## 2022-08-16 PROCEDURE — 86923 COMPATIBILITY TEST ELECTRIC: CPT

## 2022-08-16 PROCEDURE — P9016 RBC LEUKOCYTES REDUCED: HCPCS

## 2022-08-16 PROCEDURE — 36415 COLL VENOUS BLD VENIPUNCTURE: CPT | Performed by: PHYSICIAN ASSISTANT

## 2022-08-16 PROCEDURE — 85025 COMPLETE CBC W/AUTO DIFF WBC: CPT | Performed by: PHYSICIAN ASSISTANT

## 2022-08-16 PROCEDURE — 80048 BASIC METABOLIC PNL TOTAL CA: CPT | Performed by: PHYSICIAN ASSISTANT

## 2022-08-16 PROCEDURE — 86850 RBC ANTIBODY SCREEN: CPT | Performed by: PHYSICIAN ASSISTANT

## 2022-08-16 NOTE — ED PROVIDER NOTES
History  Chief Complaint   Patient presents with    Abnormal Lab     Patient comes to the ER with a Hgb of 6 6        67-year-old male past medical history chronic diastolic heart failure, paroxysmal atrial fibrillation, chronic kidney, chronic coagulopathy on coumadin and Hx of FVL and historical postop infection presenting in the emergency department for abnormal blood lab level of hemoglobin 6 6  Chronic medical problems and recently admitted 05/10 to 6/4  Remote history of spine surgery ACDF on 3/11 with resultant MRSA infection  Chronically immunosuppressed on prednisone for biopsy-proven IgA nephropathy and on Bactrim prophylaxis  He is not considered end-stage  Presents to the emergency department at direction of his nephrologist for blood transfusion with notification of hemoglobin level  Patient is otherwise asymptomatic  No active bleeding  No hematochezia or melanotic stools  Has received blood transfusions prior due to his chronic kidney disease  Most recent blood transfusion on 07/19 on most recent visit to the emergency department  Prior to Admission Medications   Prescriptions Last Dose Informant Patient Reported? Taking?    Accu-Chek Softclix Lancets lancets  Self No No   Sig: Use as instructed daily to test sugar E11 9   Patient not taking: Reported on 8/15/2022   Blood Glucose Monitoring Suppl (Accu-Chek Guide) w/Device KIT  Self Yes No   Patient not taking: Reported on 8/15/2022   DULoxetine (CYMBALTA) 60 mg delayed release capsule  Self No No   Sig: Take 1 capsule (60 mg total) by mouth daily   acetaminophen (TYLENOL) 325 mg tablet  Self No No   Sig: Take 3 tablets (975 mg total) by mouth 3 (three) times a day as needed for mild pain   amLODIPine (NORVASC) 5 mg tablet   No No   Sig: Take 1 tablet (5 mg total) by mouth daily   calcium carbonate-vitamin D (OSCAL-D) 500 mg-200 units per tablet  Self No No   Sig: Take 1 tablet by mouth daily with breakfast   doxazosin (CARDURA) 1 mg tablet  Self No No   Sig: Take 1 tablet (1 mg total) by mouth daily at bedtime   flecainide (TAMBOCOR) 50 mg tablet   No No   Sig: Take 1 tablet (50 mg total) by mouth 2 (two) times a day   glucose blood (Accu-Chek Guide) test strip  Self No No   Sig: USE TO TEST AS DIRECTED   Patient not taking: Reported on 8/15/2022   methocarbamol (ROBAXIN) 750 mg tablet  Self No No   Sig: Take 1 tablet (750 mg total) by mouth every 6 (six) hours as needed for muscle spasms   metoprolol succinate (TOPROL-XL) 100 mg 24 hr tablet   No No   Sig: TAKE 1 TABLET BY MOUTH  DAILY   ondansetron (ZOFRAN) 4 mg tablet  Self No No   Sig: Take 1 tablet (4 mg total) by mouth every 8 (eight) hours as needed for nausea or vomiting   oxyCODONE (ROXICODONE) 5 immediate release tablet  Self No No   Sig: Take 1 tablet (5 mg total) by mouth every 6 (six) hours as needed for moderate pain Max Daily Amount: 20 mg   pantoprazole (PROTONIX) 40 mg tablet  Self No No   Sig: Take 1 tablet (40 mg total) by mouth 2 (two) times a day before meals   pravastatin (PRAVACHOL) 40 mg tablet  Self No No   Sig: TAKE 1 TABLET BY MOUTH  DAILY   predniSONE 20 mg tablet  Self No No   Simg daily for 4 weeks, then 40mg daily x 2 weeks starting 2022  Then 30mg daily x 2 weeks, 20mg daily x 2 weeks, 10mg daily x 2 weeks, 5mg daily   sulfamethoxazole-trimethoprim (BACTRIM DS) 800-160 mg per tablet  Self No No   Sig: Take 1 tablet by mouth 3 (three) times a week   tamsulosin (FLOMAX) 0 4 mg  Self No No   Sig: Take 1 capsule (0 4 mg total) by mouth daily with dinner   torsemide (DEMADEX) 20 mg tablet  Self No No   Sig: Take 1 tablet (20 mg total) by mouth daily   warfarin (COUMADIN) 5 mg tablet  Self Yes No   Sig: Take by mouth daily Take 2 5 mg (1/2 tablets) daily except Wednesday and Saturday take 5 mg daily        Facility-Administered Medications: None       Past Medical History:   Diagnosis Date    Acute venous embolism and thrombosis of deep vessels of proximal lower extremity (HCC)     Last assessed: 5/18/15    Arthritis     Cellulitis     LE    Chronic kidney disease 3/2020    Acute Kidney Injury    CPAP (continuous positive airway pressure) dependence     Factor V Leiden ([address] )     Forgetfulness     Gross hematuria 5/17/2022    Headache(784 0) 3/2022    Never till cervical  spine surgery    Heart murmur 3/2020    Told when in hospital   [facility] (hard of hearing)     Hypoalbuminemia 5/11/2022    Paroxysmal atrial fibrillation (HCC)     Last assessed: 11/2/15    Sleep apnea        Past Surgical History:   Procedure Laterality Date    BACK SURGERY      Lower    COLON SURGERY      COLONOSCOPY      INCISION AND DRAINAGE POSTERIOR SPINE N/A 4/1/2022    Procedure: Posterior cervical evacuation of postoperative collection and debridement with placement of drains C3-T1; Surgeon: Arletta Bloch, MD;  [facility];  Service: Neurosurgery    IR BIOPSY KIDNEY RANDOM  5/26/2022    IR TUNNELED DIALYSIS CATHETER PLACEMENT  5/23/2022    IR TUNNELED DIALYSIS CATHETER REMOVAL  6/2/2022    MI ARTHRODESIS ANT INTERBODY MIN DISCECTOMY, CERVICAL BELOW C2 N/A 3/11/2022    Procedure: Anterior cervical discectomy and fixation fusion C5/6 and C6/7; Posterior cervical decompression and instrumented fusion C3-T1; Surgeon: Arletta Bloch, MD;  [facility];  Service: Neurosurgery    SPINE SURGERY  3/11/2022    Cervical myelopathy/ cervical fusion       Family History   Problem Relation Age of Onset    Lung cancer Mother     Hearing loss Father     Heart attack Brother     Atrial fibrillation Brother     Emphysema Sister      I have reviewed and agree with the history as documented      E-Cigarette/Vaping    E-Cigarette Use Never User      E-Cigarette/Vaping Substances     Social History     Tobacco Use    Smoking status: Never Smoker    Smokeless tobacco: Never Used   Vaping Use    Vaping Use: Never used   Substance Use Topics    Alcohol use: Not Currently     Alcohol/week: 0 0 standard drinks    Drug use: No       Review of Systems   Constitutional: Positive for fatigue  Negative for chills and fever  HENT: Negative for ear pain and sore throat  Eyes: Negative for pain and visual disturbance  Respiratory: Negative for cough and shortness of breath  Cardiovascular: Negative for chest pain and palpitations  Gastrointestinal: Negative for abdominal pain and vomiting  Genitourinary: Negative for dysuria and hematuria  Musculoskeletal: Negative for arthralgias and back pain  Skin: Negative for color change and rash  Neurological: Negative for seizures and syncope  All other systems reviewed and are negative  Physical Exam  Physical Exam  Vitals and nursing note reviewed  Constitutional:       Appearance: He is well-developed  HENT:      Head: Normocephalic and atraumatic  Eyes:      Conjunctiva/sclera: Conjunctivae normal    Cardiovascular:      Rate and Rhythm: Normal rate and regular rhythm  Heart sounds: No murmur heard  Pulmonary:      Effort: Pulmonary effort is normal  No respiratory distress  Breath sounds: Normal breath sounds  Abdominal:      Palpations: Abdomen is soft  Tenderness: There is no abdominal tenderness  Musculoskeletal:      Cervical back: Neck supple  Right lower le+ Edema present  Left lower le+ Edema present  Comments: Has chronic bilateral lower extremity pitting edema > subjectively markedly improved from prior as per patient   Skin:     General: Skin is warm and dry  Neurological:      Mental Status: He is alert           Vital Signs  ED Triage Vitals [22 1703]   Temperature Pulse Respirations Blood Pressure SpO2   98 1 °F (36 7 °C) 64 18 119/56 98 %      Temp Source Heart Rate Source Patient Position - Orthostatic VS BP Location FiO2 (%)   Oral Monitor Sitting Left arm --      Pain Score       8           Vitals:    22 1937 08/16/22 1952 08/16/22 2007 08/16/22 2022   BP: (!) 173/84 (!) 172/96 167/83 169/86   Pulse: 61 60 61 60   Patient Position - Orthostatic VS:    Lying         Visual Acuity      ED Medications  Medications - No data to display    Diagnostic Studies  Results Reviewed     Procedure Component Value Units Date/Time    Basic metabolic panel [[id number]]  (Abnormal) Collected: 08/16/22 1735    Lab Status: Final result Specimen: Blood from Arm, Right Updated: 08/16/22 1802     Sodium 139 mmol/L      Potassium 4 9 mmol/L      Chloride 103 mmol/L      CO2 25 mmol/L      ANION GAP 11 mmol/L      BUN 67 mg/dL      Creatinine 4 12 mg/dL      Glucose 177 mg/dL      Calcium 8 2 mg/dL      eGFR 13 ml/min/1 73sq m     Narrative:      National Kidney Disease Foundation guidelines for Chronic Kidney Disease (CKD):     Stage 1 with normal or high GFR (GFR > 90 mL/min/1 73 square meters)    Stage 2 Mild CKD (GFR = 60-89 mL/min/1 73 square meters)    Stage 3A Moderate CKD (GFR = 45-59 mL/min/1 73 square meters)    Stage 3B Moderate CKD (GFR = 30-44 mL/min/1 73 square meters)    Stage 4 Severe CKD (GFR = 15-29 mL/min/1 73 square meters)    Stage 5 End Stage CKD (GFR <15 mL/min/1 73 square meters)  Note: GFR calculation is accurate only with a steady state creatinine    CBC and differential [[id number]]  (Abnormal) Collected: 08/16/22 1735    Lab Status: Final result Specimen: Blood from Arm, Right Updated: 08/16/22 1757     WBC 6 56 Thousand/uL      RBC 2 18 Million/uL      Hemoglobin 6 6 g/dL      Hematocrit 20 4 %      MCV 94 fL      MCH 30 3 pg      MCHC 32 4 g/dL      RDW 14 6 %      MPV 9 7 fL      Platelets 660 Thousands/uL      nRBC 0 /100 WBCs      Neutrophils Relative 82 %      Immat GRANS % 1 %      Lymphocytes Relative 11 %      Monocytes Relative 6 %      Eosinophils Relative 0 %      Basophils Relative 0 %      Neutrophils Absolute 5 29 Thousands/µL      Immature Grans Absolute 0 09 Thousand/uL      Lymphocytes Absolute 0 73 Thousands/µL      Monocytes Absolute 0 42 Thousand/µL      Eosinophils Absolute 0 01 Thousand/µL      Basophils Absolute 0 02 Thousands/µL                  No orders to display              Procedures  Procedures         ED Course  ED Course as of 08/16/22 2103   Tue Aug 16, 2022   1745 Stable and normotensive  Pending laboratory work  1758 Confirmation hemoglobin 6 6  Ordered 1 unit cross matched packed red blood cells with transfusion order  Patient remains stable  Case d/w Dr Ash Larsen who agrees w/ assessment and plan for Blood Tx and discharge given his chronicity and prior and recurrent requirements for tranfusions  1907 Patient receiving blood administration   1934 Patient currently receiving blood transfusion  Non acidotic  Not hyperkalemic  2055 Patient finished with blood transfusion  No difficulty during transfusion and no signs of reaction  Maintains adequate oxygen saturations  Normotensive and not tachycardic  Patient feeling well  Ready for discharge                                               MDM  Number of Diagnoses or Management Options  Anemia of chronic disease: established and worsening  Chronic kidney disease: minor  Encounter for blood transfusion: established and improving     Amount and/or Complexity of Data Reviewed  Clinical lab tests: ordered and reviewed        Disposition  Final diagnoses:   Chronic kidney disease   Anemia of chronic disease   Encounter for blood transfusion     Time reflects when diagnosis was documented in both MDM as applicable and the Disposition within this note     Time User Action Codes Description Comment    8/16/2022  8:58 PM Hall Bumps Add [N18 9] Chronic kidney disease     8/16/2022  8:59 PM Hall Bumps Add [D63 8] Anemia of chronic disease     8/16/2022  9:00 PM Hall Bumps Add [Z51 89] Encounter for blood transfusion       ED Disposition     ED Disposition   Discharge    Condition   Stable    Date/Time   Tue Aug 16, 2022  9:01 PM    Comment   Jhonathan Azevedoe discharge to home/self care  Follow-up Information    None         Patient's Medications   Discharge Prescriptions    No medications on file       No discharge procedures on file      PDMP Review       Value Time User    PDMP Reviewed  Yes 8/15/2022 12:39 PM Jorge L Rothman, [facility]          ED Provider  Electronically Signed by           Marvin Solis PA-C  08/16/22 4066

## 2022-08-16 NOTE — TELEPHONE ENCOUNTER
----- Message from Sarah Perrin DO sent at 8/16/2022  2:07 PM EDT -----  Patient's Hgb < 7, sCr higher at 4 05  Patient would benefit from blood transfusion  I recommend Er visit for this  Please let the patient's wife know  Thank you

## 2022-08-16 NOTE — ASSESSMENT & PLAN NOTE
Writer called the floor nurse Pilar regarding order for IR miscellaneous drainage. Plan is to perform the case tomorrow as patient received Lovenox and ASA today. Lovenox and ASA on MAR hold per provider. Procedure is local only per Dr. Lubin so patient can continue to eat. Will call again tomorrow with procedure time. CARLY Quezada verbalized understanding and denied questions.    · Status post cervical spinal fusion on 3/11/22 as above   · See related plan

## 2022-08-16 NOTE — TELEPHONE ENCOUNTER
Called patients wife Flavia Carpenter and informed her of the following:    Patient's Hgb < 7, sCr higher at 4 05  Patient would benefit from blood transfusion  Dr Renny Mosquera  recommend Er visit for this  Patients wife verbally understood and states she will be taking him to [place] in about an hour or so  She states she would like the order to be in so they know she is coming

## 2022-08-17 ENCOUNTER — ANTICOAG VISIT (OUTPATIENT)
Dept: FAMILY MEDICINE CLINIC | Facility: CLINIC | Age: 72
End: 2022-08-17

## 2022-08-17 ENCOUNTER — APPOINTMENT (OUTPATIENT)
Dept: PHYSICAL THERAPY | Facility: CLINIC | Age: 72
End: 2022-08-17
Payer: COMMERCIAL

## 2022-08-17 ENCOUNTER — TELEPHONE (OUTPATIENT)
Dept: NEPHROLOGY | Facility: CLINIC | Age: 72
End: 2022-08-17

## 2022-08-17 DIAGNOSIS — Z79.01 ANTICOAGULATED ON COUMADIN: ICD-10-CM

## 2022-08-17 DIAGNOSIS — D68.51 FACTOR V LEIDEN MUTATION (HCC): Primary | ICD-10-CM

## 2022-08-17 LAB
ABO GROUP BLD BPU: NORMAL
BPU ID: NORMAL
CROSSMATCH: NORMAL
UNIT DISPENSE STATUS: NORMAL
UNIT PRODUCT CODE: NORMAL
UNIT PRODUCT VOLUME: 350 ML
UNIT RH: NORMAL

## 2022-08-17 NOTE — TELEPHONE ENCOUNTER
Appointment Confirmation   Person confirmed appointment with  If not patient, name of the person lm    Date and time of appointment 08/18   Patient acknowledged and will be at appointment? no   Did you advise the patient that they will need a urine sample if they are a new patient? no   Did you advise the patient to bring their current medications for verification? (including any OTC) yes   Additional Information Lm to confirm

## 2022-08-17 NOTE — PROGRESS NOTES
Indira Amado MD  P [facility]  same       To review w/Dr Janessa Shankar -- has been increasing INR:  7/22 = 1 8  8/08 = 2 7  8/15 = 3 4    Reviewed with Dr Janessa Shankar -- to have patient HOLD 1 day and continue same, Recheck in 1 week    3 orders placed

## 2022-08-18 ENCOUNTER — OFFICE VISIT (OUTPATIENT)
Dept: NEPHROLOGY | Facility: CLINIC | Age: 72
End: 2022-08-18
Payer: COMMERCIAL

## 2022-08-18 VITALS
WEIGHT: 243 LBS | SYSTOLIC BLOOD PRESSURE: 132 MMHG | HEIGHT: 71 IN | DIASTOLIC BLOOD PRESSURE: 66 MMHG | BODY MASS INDEX: 34.02 KG/M2

## 2022-08-18 DIAGNOSIS — E83.39 HYPERPHOSPHATEMIA: ICD-10-CM

## 2022-08-18 DIAGNOSIS — I50.32 CHRONIC DIASTOLIC (CONGESTIVE) HEART FAILURE (HCC): ICD-10-CM

## 2022-08-18 DIAGNOSIS — N02.8 IGA NEPHROPATHY DETERMINED BY BIOPSY OF KIDNEY: Primary | ICD-10-CM

## 2022-08-18 DIAGNOSIS — E87.1 HYPONATREMIA: ICD-10-CM

## 2022-08-18 DIAGNOSIS — D64.9 ANEMIA, UNSPECIFIED TYPE: ICD-10-CM

## 2022-08-18 DIAGNOSIS — R80.8 OTHER PROTEINURIA: ICD-10-CM

## 2022-08-18 DIAGNOSIS — E83.52 HYPERCALCEMIA: ICD-10-CM

## 2022-08-18 DIAGNOSIS — R60.0 LOWER EXTREMITY EDEMA: ICD-10-CM

## 2022-08-18 PROCEDURE — 99214 OFFICE O/P EST MOD 30 MIN: CPT | Performed by: INTERNAL MEDICINE

## 2022-08-18 NOTE — PROGRESS NOTES
NEPHROLOGY OUTPATIENT PROGRESS NOTE   Harry Fan 71 y o  male MRN: [id number]  DATE: 8/18/2022  Reason for visit:   Chief Complaint   Patient presents with    Chronic Kidney Disease    Follow-up        Patient Instructions   1  JANEL d/t IgAN, biopsy proven, in setting of recent MRSA infection  -renal biopsy performed May 26, 2022 was limited as only 6 intact glomeruli were present for evaluation on light microscopy  IgA dominant immune complex deposition noted by immunofluorescence  Differential includes IgA nephropathy both primary and secondary forms versus infectious associated glomerulonephritis  Staph aureus infections have been associated with IgA dominant immune complex deposition  Patient did have recent MRSA surgical site infection so infection associated GN should be considered   -prednisone 60 mg daily begun June 2nd   -on Bactrim 3 times weekly for PJP prophylaxis   -on Protonix 40 mg twice daily for GI prophylaxis  -continue oscal D for bone protection on high dose steroids  -continue daily prednisone 60mg for now  -monitor weekly bloodwork and urine testing  -if this was a primary IgA nephropathy, it would be compatible with [place] classification of M0 E1 S0 T1-C1   -of 38 glomeruli, 6 were intact, forehead global glomerulosclerosis, segmental sclerosis was absent  Moderate interstitial fibrosis and tubular atrophy as well as arterial intimal fibrosis and mild arterolar hyalinosis were noted  -did not require dialysis inpatient, permacath removed prior to discharge  -sCr now 4 12 and stable, slowly improving from 5 34  Do suspect anemia playing a role in slowing renal recovery  -BUN improving  -as the patient has had an acute onset of rapidly progressive glomerulonephritis with normal complement levels and deposition of mesangial IgA, 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  -on prednisone 5 mg daily   Will obtain DEXA scan    -may reduce to prednisone 5mg every other day for 2 weeks and then stop   -when you stop prednisone, you stop taking bactrim   -may drop protonix to once a day once you stop prednisone  -may need to consider rituxan infusion in light of podocytopathy       2  Hyponatremia, hypercalcemia - resolved     3  Hyperphosphatemia - phos 4 4 as of 7/22/22, monitor phos monthly  Will hold off on restarting Renagel 800 mg 3 times daily with meals for now  Likely d/t decreased excretion in setting of JANEL  Should follow low phos diet  4  Proteinuria - UpCr in setting of IgAN shows UpCr improved from 18 8g to 1 5g as of 8/15/22      5  HTN - BP well controlled for age  Continue metoprolol 100 mg daily, Cardura 1 mg at bedtime, amlodipine 5 mg daily, flomax, torsemide 20mg daily     6  Anemia ? 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  -     Cancel: Basic metabolic panel; Standing  -     Cancel: Urinalysis with microscopic; Standing  -     Cancel: Protein / creatinine ratio, urine; Standing  -     Cancel: Basic metabolic panel  -     Cancel: Urinalysis with microscopic  -     Cancel: Protein / creatinine ratio, urine  -     Phosphorus;  Future  -     Basic metabolic panel; Standing  -     Protein / creatinine ratio, urine; Standing  -     Urinalysis with microscopic; Standing  -     Basic metabolic panel  -     Protein / creatinine ratio, urine  - Urinalysis with microscopic  -     Phosphorus  -     DXA bone density spine hip and pelvis; Future    Chronic diastolic (congestive) heart failure (HCC)    Anemia, unspecified type    Hypercalcemia    Hyperphosphatemia  -     Phosphorus; Future  -     Phosphorus    Hyponatremia    Lower extremity edema    Other proteinuria  -     Cancel: Protein / creatinine ratio, urine; Standing  -     Cancel: Protein / creatinine ratio, urine  -     Protein / creatinine ratio, urine; Standing  -     Protein / creatinine ratio, urine        Assessment/Plan:  1  JANEL d/t IgAN, biopsy proven, in setting of recent MRSA infection  -renal biopsy performed May 26, 2022 was limited as only 6 intact glomeruli were present for evaluation on light microscopy   IgA dominant immune complex deposition noted by immunofluorescence   Differential includes IgA nephropathy both primary and secondary forms versus infectious associated glomerulonephritis   Staph aureus infections have been associated with IgA dominant immune complex deposition   Patient did have recent MRSA surgical site infection so infection associated GN should be considered   -prednisone 60 mg daily begun June 2nd   -on Bactrim 3 times weekly for PJP prophylaxis   -on Protonix 40 mg twice daily for GI prophylaxis  -continue oscal D for bone protection on high dose steroids  -continue daily prednisone 60mg for now  -monitor weekly bloodwork and urine testing  -if this was a primary IgA nephropathy, it would be compatible with [place] classification of M0 E1 S0 T1-C1   -of 38 glomeruli, 6 were intact, forehead global glomerulosclerosis, segmental sclerosis was absent   Moderate interstitial fibrosis and tubular atrophy as well as arterial intimal fibrosis and mild arterolar hyalinosis were noted  -did not require dialysis inpatient, permacath removed prior to discharge  -sCr now 4 12 and stable, slowly improving from 5 34   Do suspect anemia playing a role in slowing renal recovery  -BUN improving  -as the patient has had an acute onset of rapidly progressive glomerulonephritis with normal complement levels and deposition of mesangial IgA, 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  -on prednisone 5 mg daily  Will obtain DEXA scan    -may reduce to prednisone 5mg every other day for 2 weeks and then stop   -when you stop prednisone, you stop taking bactrim   -may drop protonix to once a day once you stop prednisone  -may need to consider rituxan infusion in light of podocytopathy       2  Hyponatremia, hypercalcemia - resolved     3  Hyperphosphatemia - phos 4 4 as of 7/22/22, monitor phos monthly   Will hold off on restarting Renagel 800 mg 3 times daily with meals for now  Jessica Roach d/t decreased excretion in setting of JANEL  Should follow low phos diet       4  Proteinuria - UpCr in setting of IgAN shows UpCr improved from 18 8g to 1 5g as of 8/15/22      5  HTN - BP well controlled for age  Continue metoprolol 100 mg daily, Cardura 1 mg at bedtime, amlodipine 5 mg daily, flomax, torsemide 20mg daily     6  Anemia ? D/t underlying GN/CKD - Hgb 6 6, received blood transfusion, monitor CBC, begin epogen, staff will reach out about scheduling this       7  LE edema likely d/t nephrotic syndrome - continue torsemide 20mg daily, will monitor K/SCr weekly  Monitor daily weight/BP readings      Will continue weekly CMP, CBC, Urinalysis and urine protein:Cr and monthly phos    May stop prophylaxis once off prednisone  Continue torsemide 20mg daily for edema/nephrotic syndrome  RTC in 1 month  SUBJECTIVE / INTERVAL HISTORY:  71 y o  male presents in follow up of IgAN  Alana Mckeon denies any recent illness/hospitalizations/medication changes since last office visit  Denies NSAID use  LE edema has improved  HTN-  BP at home fluctuates often  Sometimes in the 120s, other times 150s  Denies cramping  Denies dizziness now     Wishes he could walk  Has a lot of knee pain  Review of Systems   Constitutional: Negative for chills and fever  HENT: Negative for sore throat  Eyes: Negative for visual disturbance  Respiratory: Negative for cough and shortness of breath  Cardiovascular: Positive for leg swelling (present but much improved)  Negative for chest pain  Gastrointestinal: Negative for abdominal pain, constipation, diarrhea, nausea and vomiting  Endocrine: Negative for polyuria  Genitourinary: Positive for urgency  Negative for decreased urine volume, difficulty urinating, dysuria and hematuria  Musculoskeletal: Positive for back pain (occasional low back pain)  Negative for myalgias  Skin: Negative for rash  Neurological: Negative for dizziness, light-headedness and numbness  Psychiatric/Behavioral: Negative for confusion  OBJECTIVE:  /66 (BP Location: Left arm, Patient Position: Sitting, Cuff Size: Large)   Ht 5' 11" (1 803 m)   Wt 110 kg (243 lb)   BMI 33 89 kg/m²  Body mass index is 33 89 kg/m²  Physical exam:  Physical Exam  Vitals reviewed  Constitutional:       General: He is not in acute distress  Appearance: Normal appearance  He is well-developed  He is obese  He is not diaphoretic  HENT:      Head: Normocephalic and atraumatic  Nose: Nose normal       Mouth/Throat:      Mouth: Mucous membranes are moist       Pharynx: No oropharyngeal exudate  Eyes:      General: No scleral icterus  Right eye: No discharge  Left eye: No discharge  Neck:      Thyroid: No thyromegaly  Cardiovascular:      Rate and Rhythm: Normal rate and regular rhythm  Heart sounds: Normal heart sounds  Comments: B/l LE   Pulmonary:      Effort: Pulmonary effort is normal       Breath sounds: Normal breath sounds  No wheezing or rales  Abdominal:      General: Bowel sounds are normal  There is no distension  Palpations: Abdomen is soft  Tenderness:  There is no abdominal tenderness  Musculoskeletal:         General: Swelling (b/l LE) present  Normal range of motion  Cervical back: Neck supple  Lymphadenopathy:      Cervical: No cervical adenopathy  Skin:     General: Skin is warm and dry  Findings: Bruising (b/l UE) present  No rash  Neurological:      General: No focal deficit present  Mental Status: He is alert        Comments: awake   Psychiatric:         Mood and Affect: Mood normal          Behavior: Behavior normal          Medications:    Current Outpatient Medications:     acetaminophen (TYLENOL) 325 mg tablet, Take 3 tablets (975 mg total) by mouth 3 (three) times a day as needed for mild pain, Disp: , Rfl: 0    amLODIPine (NORVASC) 5 mg tablet, Take 1 tablet (5 mg total) by mouth daily, Disp: 90 tablet, Rfl: 3    calcium carbonate-vitamin D (OSCAL-D) 500 mg-200 units per tablet, Take 1 tablet by mouth daily with breakfast, Disp: 30 tablet, Rfl: 0    doxazosin (CARDURA) 1 mg tablet, Take 1 tablet (1 mg total) by mouth daily at bedtime, Disp: 90 tablet, Rfl: 1    DULoxetine (CYMBALTA) 60 mg delayed release capsule, Take 1 capsule (60 mg total) by mouth daily, Disp: 90 capsule, Rfl: 1    flecainide (TAMBOCOR) 50 mg tablet, Take 1 tablet (50 mg total) by mouth 2 (two) times a day, Disp: 180 tablet, Rfl: 3    methocarbamol (ROBAXIN) 750 mg tablet, Take 1 tablet (750 mg total) by mouth every 6 (six) hours as needed for muscle spasms, Disp: 30 tablet, Rfl: 0    metoprolol succinate (TOPROL-XL) 100 mg 24 hr tablet, TAKE 1 TABLET BY MOUTH  DAILY, Disp: 90 tablet, Rfl: 0    ondansetron (ZOFRAN) 4 mg tablet, Take 1 tablet (4 mg total) by mouth every 8 (eight) hours as needed for nausea or vomiting, Disp: 30 tablet, Rfl: 0    oxyCODONE (ROXICODONE) 5 immediate release tablet, Take 1 tablet (5 mg total) by mouth every 6 (six) hours as needed for moderate pain Max Daily Amount: 20 mg, Disp: 10 tablet, Rfl: 0    pantoprazole (PROTONIX) 40 mg tablet, Take 1 tablet (40 mg total) by mouth 2 (two) times a day before meals, Disp: 180 tablet, Rfl: 3    pravastatin (PRAVACHOL) 40 mg tablet, TAKE 1 TABLET BY MOUTH  DAILY, Disp: 90 tablet, Rfl: 1    predniSONE 20 mg tablet, 60mg daily for 4 weeks, then 40mg daily x 2 weeks starting June 30th, 2022  Then 30mg daily x 2 weeks, 20mg daily x 2 weeks, 10mg daily x 2 weeks, 5mg daily, Disp: 270 tablet, Rfl: 0    sulfamethoxazole-trimethoprim (BACTRIM DS) 800-160 mg per tablet, Take 1 tablet by mouth 3 (three) times a week, Disp: 36 tablet, Rfl: 3    tamsulosin (FLOMAX) 0 4 mg, Take 1 capsule (0 4 mg total) by mouth daily with dinner, Disp: 90 capsule, Rfl: 1    torsemide (DEMADEX) 20 mg tablet, Take 1 tablet (20 mg total) by mouth daily, Disp: 90 tablet, Rfl: 1    warfarin (COUMADIN) 5 mg tablet, Take by mouth daily Take 2 5 mg (1/2 tablets) daily except Wednesday and Saturday take 5 mg daily  , Disp: , Rfl:     Accu-Chek Softclix Lancets lancets, Use as instructed daily to test sugar E11 9 (Patient not taking: No sig reported), Disp: 200 each, Rfl: 2    Blood Glucose Monitoring Suppl (Accu-Chek Guide) w/Device KIT, , Disp: , Rfl:     glucose blood (Accu-Chek Guide) test strip, USE TO TEST AS DIRECTED (Patient not taking: No sig reported), Disp: 50 strip, Rfl: 2    Allergies:   Allergies as of 08/18/2022 - Reviewed 08/18/2022   Allergen Reaction Noted    Morphine GI Intolerance 09/11/2018    Vitamin k Other (See Comments) 12/28/2020       The following portions of the patient's history were reviewed and updated as appropriate: past family history, past surgical history and problem list     Laboratory Results:  Lab Results   Component Value Date    SODIUM 139 08/16/2022    K 4 9 08/16/2022     08/16/2022    CO2 25 08/16/2022    BUN 67 (H) 08/16/2022    CREATININE 4 12 (H) 08/16/2022    GLUC 177 (H) 08/16/2022    CALCIUM 8 2 (L) 08/16/2022        Lab Results   Component Value Date    CALCIUM 8 2 (L) 08/16/2022    PHOS 5 5 (H) 06/03/2022       Portions of the record may have been created with voice recognition software   Occasional wrong word or "sound a like" substitutions may have occurred due to the inherent limitations of voice recognition software   Read the chart carefully and recognize, using context, where substitutions have occurred

## 2022-08-18 NOTE — PATIENT INSTRUCTIONS
1  JANEL d/t IgAN, biopsy proven, in setting of recent MRSA infection  -renal biopsy performed May 26, 2022 was limited as only 6 intact glomeruli were present for evaluation on light microscopy  IgA dominant immune complex deposition noted by immunofluorescence  Differential includes IgA nephropathy both primary and secondary forms versus infectious associated glomerulonephritis  Staph aureus infections have been associated with IgA dominant immune complex deposition  Patient did have recent MRSA surgical site infection so infection associated GN should be considered   -prednisone 60 mg daily begun June 2nd   -on Bactrim 3 times weekly for PJP prophylaxis   -on Protonix 40 mg twice daily for GI prophylaxis  -continue oscal D for bone protection on high dose steroids  -continue daily prednisone 60mg for now  -monitor weekly bloodwork and urine testing  -if this was a primary IgA nephropathy, it would be compatible with [place] classification of M0 E1 S0 T1-C1   -of 38 glomeruli, 6 were intact, forehead global glomerulosclerosis, segmental sclerosis was absent  Moderate interstitial fibrosis and tubular atrophy as well as arterial intimal fibrosis and mild arterolar hyalinosis were noted  -did not require dialysis inpatient, permacath removed prior to discharge  -sCr now 4 12 and stable, slowly improving from 5 34  Do suspect anemia playing a role in slowing renal recovery  -BUN improving  -as the patient has had an acute onset of rapidly progressive glomerulonephritis with normal complement levels and deposition of mesangial IgA, 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  -on prednisone 5 mg daily  Will obtain DEXA scan    -may reduce to prednisone 5mg every other day for 2 weeks and then stop   -when you stop prednisone, you stop taking bactrim   -may drop protonix to once a day once you stop prednisone  -may need to consider rituxan infusion in light of podocytopathy       2  Hyponatremia, hypercalcemia - resolved     3  Hyperphosphatemia - phos 4 4 as of 7/22/22, monitor phos monthly  Will hold off on restarting Renagel 800 mg 3 times daily with meals for now  Likely d/t decreased excretion in setting of JANEL  Should follow low phos diet  4  Proteinuria - UpCr in setting of IgAN shows UpCr improved from 18 8g to 1 5g as of 8/15/22      5  HTN - BP well controlled for age  Continue metoprolol 100 mg daily, Cardura 1 mg at bedtime, amlodipine 5 mg daily, flomax, torsemide 20mg daily     6  Anemia ? D/t underlying GN/CKD - Hgb 6 6, received blood transfusion, monitor CBC, begin epogen, staff will reach out about scheduling this  7  LE edema likely d/t nephrotic syndrome - continue torsemide 20mg daily, will monitor K/SCr weekly  Monitor daily weight/BP readings  Will continue weekly CMP, CBC, Urinalysis and urine protein:Cr and monthly phos  May stop prophylaxis once off prednisone  Continue torsemide 20mg daily for edema/nephrotic syndrome  RTC in 1 month

## 2022-08-19 ENCOUNTER — TELEPHONE (OUTPATIENT)
Dept: NEPHROLOGY | Facility: CLINIC | Age: 72
End: 2022-08-19

## 2022-08-19 ENCOUNTER — OFFICE VISIT (OUTPATIENT)
Dept: PHYSICAL THERAPY | Facility: CLINIC | Age: 72
End: 2022-08-19
Payer: COMMERCIAL

## 2022-08-19 DIAGNOSIS — M17.0 PRIMARY OSTEOARTHRITIS OF BOTH KNEES: Primary | ICD-10-CM

## 2022-08-19 PROCEDURE — 97140 MANUAL THERAPY 1/> REGIONS: CPT | Performed by: PHYSICAL THERAPIST

## 2022-08-19 PROCEDURE — 97110 THERAPEUTIC EXERCISES: CPT | Performed by: PHYSICAL THERAPIST

## 2022-08-19 NOTE — PROGRESS NOTES
Daily Note     Today's date: 2022  Patient name: Chapo Jason  : 1950  MRN: [id number]  Referring provider: Roxy Barreto PT  Dx:   Encounter Diagnosis     ICD-10-CM    1  Primary osteoarthritis of both knees  M17 0                   Subjective: Patient reports he has experienced increased pain with exercises he was given for HEP  He notes he has also been performing additional exercises which have been painful as well  Objective: See treatment diary below      Assessment:   Stage: chronic   Stability of Symptoms:  At Evaluation: stable - unchanged  Global Rating of Change:   Symptom Irritability Level: high  Primary Movement Diagnosis: poor motor control   Patient Specific Functional Scale:   8/3/22: improve confidence with walking 0/10, Improve LE strength 0/10, learn exercises to improve endurance and activity level 0/10; Total 0  FOTO Prediction: 34 by visit 15  FOTO Progress: 8 at evaluation   Greatest Concern: declining function and fear of falling  Current Activity Plan: added to HEP (8/3); updated HEP and reiterated the importance of only performing HEP exercises to accurately determine his response to treatment ()  Current Educational Needs: progressions, safety of activity     Patient had good tolerance to manual treatment today  He was fatigued with mat-based exercises but noted no increased knee pain following  HEP was adjusted accordingly  Skilled PT continues to be required to guide progression of strength and control to allow him to return to walking with less pain and improved confidence and to improve his endurance for daily activities          Plan: Continue with POC - monitor tolerance to treatment and HEP     Daily Treatment Diary     DX: (B) Knee OA  EPOC: 22  Follow Up with Referring Provider: AVINASH  Precautions: NA  CO-MORBIDITIES: A-fib, Hx of DVT  HEP ACCESS CODE: [id number]       Treatment Diary          Manual Therapy         (B) Knee Traction 8 min Therapeutic Exercise  HEP         Recumbent Bike  4 min                  Standing Hip Flex 8/3 Hold        Standing Hip ABD 8/3 Hold        Standing Hip Ext 8/3 Hold        HS Curls 8/3 Hold                   LAQ  15x ea        LFS: Alt LE  15x ea        S/L Clamshells  10x ea        Bridge  15x                  Neuromuscular Reeducation                    Romberg Balance with Reach Matrix                    Foam Side stepping                    Sharpened Romberg Balance                    FWD Mini Lunge          LAT Mini Lunge                              Therapeutic Activity                              Gait Training                                        Modalities

## 2022-08-19 NOTE — TELEPHONE ENCOUNTER
----- Message from Kourtney Torres DO sent at 8/18/2022  3:42 PM EDT -----  Call patient to determine which infusion center he could receive epogen  Once that is determined, I can order the epogen  Please task me with this information  Thank you

## 2022-08-19 NOTE — TELEPHONE ENCOUNTER
Called patient and left a voicemail asking what infusion center he could receive epogen  Once that is determined, Dr Roby Gastelum can order the epogen  As I was documenting the Patients wife Sukumar Duffy called back and stated she would like to go to the [facility] for that

## 2022-08-22 ENCOUNTER — PATIENT OUTREACH (OUTPATIENT)
Dept: FAMILY MEDICINE CLINIC | Facility: CLINIC | Age: 72
End: 2022-08-22

## 2022-08-22 NOTE — PROGRESS NOTES
Outpatient Care Management Note:    No response to telephone calls or unable to reach letter mailed to patient  Episode closed and removed self from the care team  Care manager will remain available if patient returns my call  Please re consult as needed

## 2022-08-23 LAB
APPEARANCE UR: CLEAR
BACTERIA URNS QL MICRO: ABNORMAL
BILIRUB UR QL STRIP: NEGATIVE
BUN SERPL-MCNC: 61 MG/DL (ref 8–27)
BUN/CREAT SERPL: 16 (ref 10–24)
CALCIUM SERPL-MCNC: 8.7 MG/DL (ref 8.6–10.2)
CASTS URNS QL MICRO: ABNORMAL /LPF
CHLORIDE SERPL-SCNC: 102 MMOL/L (ref 96–106)
CO2 SERPL-SCNC: 25 MMOL/L (ref 20–29)
COLOR UR: YELLOW
CREAT SERPL-MCNC: 3.8 MG/DL (ref 0.76–1.27)
CREAT UR-MCNC: 49.5 MG/DL
EGFR: 16 ML/MIN/1.73
EPI CELLS #/AREA URNS HPF: ABNORMAL /HPF (ref 0–10)
GLUCOSE SERPL-MCNC: 134 MG/DL (ref 65–99)
GLUCOSE UR QL: NEGATIVE
HGB UR QL STRIP: ABNORMAL
KETONES UR QL STRIP: NEGATIVE
LEUKOCYTE ESTERASE UR QL STRIP: NEGATIVE
MICRO URNS: ABNORMAL
NITRITE UR QL STRIP: NEGATIVE
PH UR STRIP: 5 [PH] (ref 5–7.5)
PHOSPHATE SERPL-MCNC: 4 MG/DL (ref 2.8–4.1)
POTASSIUM SERPL-SCNC: 4.2 MMOL/L (ref 3.5–5.2)
PROT UR QL STRIP: ABNORMAL
PROT UR-MCNC: 70.3 MG/DL
PROT/CREAT UR: 1420 MG/G CREAT (ref 0–200)
RBC #/AREA URNS HPF: >30 /HPF (ref 0–2)
SODIUM SERPL-SCNC: 143 MMOL/L (ref 134–144)
SP GR UR: 1.01 (ref 1–1.03)
UROBILINOGEN UR STRIP-ACNC: 0.2 MG/DL (ref 0.2–1)
WBC #/AREA URNS HPF: ABNORMAL /HPF (ref 0–5)

## 2022-08-24 ENCOUNTER — OFFICE VISIT (OUTPATIENT)
Dept: PHYSICAL THERAPY | Facility: CLINIC | Age: 72
End: 2022-08-24
Payer: COMMERCIAL

## 2022-08-24 DIAGNOSIS — M17.0 PRIMARY OSTEOARTHRITIS OF BOTH KNEES: Primary | ICD-10-CM

## 2022-08-24 DIAGNOSIS — D63.1 ANEMIA DUE TO STAGE 4 CHRONIC KIDNEY DISEASE (HCC): ICD-10-CM

## 2022-08-24 DIAGNOSIS — N18.4 ANEMIA DUE TO STAGE 4 CHRONIC KIDNEY DISEASE (HCC): ICD-10-CM

## 2022-08-24 DIAGNOSIS — N05.9 GLOMERULONEPHRITIS: Primary | ICD-10-CM

## 2022-08-24 PROCEDURE — 97110 THERAPEUTIC EXERCISES: CPT | Performed by: PHYSICAL THERAPIST

## 2022-08-24 PROCEDURE — 97112 NEUROMUSCULAR REEDUCATION: CPT | Performed by: PHYSICAL THERAPIST

## 2022-08-24 PROCEDURE — 97140 MANUAL THERAPY 1/> REGIONS: CPT | Performed by: PHYSICAL THERAPIST

## 2022-08-24 NOTE — PROGRESS NOTES
Daily Note     Today's date: 2022  Patient name: Verna Cantrell  : 1950  MRN: [id number]  Referring provider: Ana Olivares, PT  Dx:   Encounter Diagnosis     ICD-10-CM    1  Primary osteoarthritis of both knees  M17 0                   Subjective: Patient reports good tolerance to his LV  He reports his knees have not been as painful overall  He notes he is seeing improvement with updated HEP - (R) hip strength is improving  Objective: See treatment diary below      Assessment:   Stage: chronic   Stability of Symptoms:  At Evaluation: stable - unchanged  Global Rating of Change:   Symptom Irritability Level: high  Primary Movement Diagnosis: poor motor control   Patient Specific Functional Scale:   8/3/22: improve confidence with walking 0/10, Improve LE strength 0/10, learn exercises to improve endurance and activity level 0/10; Total 0  FOTO Prediction: 34 by visit 15  FOTO Progress: 8 at evaluation   Greatest Concern: declining function and fear of falling  Current Activity Plan: added to HEP (8/3); updated HEP and reiterated the importance of only performing HEP exercises to accurately determine his response to treatment ()  Current Educational Needs: progressions, safety of activity     Patient continues to have good tolerance to manual treatment - less pain in the knees following  He demonstrated good overall form with exercises - able to tolerate progressions without increased pain  Skilled PT continues to be required to guide progression of strength and control to allow him to return to walking with less pain and improved confidence and to improve his endurance for daily activities          Plan: Continue with POC - monitor tolerance to treatment and HEP     Daily Treatment Diary     DX: (B) Knee OA  EPOC: 22  Follow Up with Referring Provider: AVINASH  Precautions: NA  CO-MORBIDITIES: A-fib, Hx of DVT  HEP ACCESS CODE: [id number]       Treatment Diary         Manual Therapy         (B) Knee Traction 8 min 8 min                                                             Therapeutic Exercise  HEP         Recumbent Bike  4 min 5 min                 Standing Hip Flex 8/3 Hold 10x ea       Standing Hip ABD 8/3 Hold 10x ea       Standing Hip Ext 8/3 Hold        HS Curls 8/3 Hold                   LAQ  15x ea 2# 20x ea       LFS: Alt LE  15x ea 20x ea       S/L Clamshells  10x ea 20x ea       Bridge  15x 20x                 Neuromuscular Reeducation                    Romberg Balance with Reach Matrix                    Foam Side stepping   6 Feet  3 laps                 Sharpened Romberg Balance                    FWD Mini Lunge          LAT Mini Lunge                              Therapeutic Activity                              Gait Training                                        Modalities

## 2022-08-25 DIAGNOSIS — R07.81 RIB PAIN ON RIGHT SIDE: ICD-10-CM

## 2022-08-25 RX ORDER — OXYCODONE HYDROCHLORIDE 5 MG/1
5 TABLET ORAL EVERY 6 HOURS PRN
Qty: 10 TABLET | Refills: 0 | Status: SHIPPED | OUTPATIENT
Start: 2022-08-25

## 2022-08-25 NOTE — TELEPHONE ENCOUNTER
I have called [facility] center and scheduled the patient for his first 2 infusions  They stated they have an open schedule after his second infusion and the patient can then schedule further with times of their choice  I then called the patient and spoke with his wife Flavia Carpenter and explained the dates and times of the infusions  Monday 8/29/2022 @ 1:30  Monday 9/12/2022 @ 2:30    Flavia Carpenter verbally understood and asked how many infusions does Dr Renny Mosquera want him to have so they can schedule out further  Please advise

## 2022-08-26 ENCOUNTER — OFFICE VISIT (OUTPATIENT)
Dept: OBGYN CLINIC | Facility: MEDICAL CENTER | Age: 72
End: 2022-08-26
Payer: COMMERCIAL

## 2022-08-26 ENCOUNTER — OFFICE VISIT (OUTPATIENT)
Dept: PHYSICAL THERAPY | Facility: CLINIC | Age: 72
End: 2022-08-26
Payer: COMMERCIAL

## 2022-08-26 VITALS
BODY MASS INDEX: 34.5 KG/M2 | SYSTOLIC BLOOD PRESSURE: 146 MMHG | HEART RATE: 78 BPM | HEIGHT: 71 IN | WEIGHT: 246.4 LBS | DIASTOLIC BLOOD PRESSURE: 79 MMHG

## 2022-08-26 DIAGNOSIS — N05.9 GLOMERULONEPHRITIS: Primary | ICD-10-CM

## 2022-08-26 DIAGNOSIS — N18.4 ANEMIA DUE TO STAGE 4 CHRONIC KIDNEY DISEASE (HCC): ICD-10-CM

## 2022-08-26 DIAGNOSIS — D63.1 ANEMIA DUE TO STAGE 4 CHRONIC KIDNEY DISEASE (HCC): ICD-10-CM

## 2022-08-26 DIAGNOSIS — M17.0 BILATERAL PRIMARY OSTEOARTHRITIS OF KNEE: Primary | ICD-10-CM

## 2022-08-26 DIAGNOSIS — M17.0 PRIMARY OSTEOARTHRITIS OF BOTH KNEES: Primary | ICD-10-CM

## 2022-08-26 PROCEDURE — 1160F RVW MEDS BY RX/DR IN RCRD: CPT | Performed by: ORTHOPAEDIC SURGERY

## 2022-08-26 PROCEDURE — 97110 THERAPEUTIC EXERCISES: CPT | Performed by: PHYSICAL THERAPIST

## 2022-08-26 PROCEDURE — 97140 MANUAL THERAPY 1/> REGIONS: CPT | Performed by: PHYSICAL THERAPIST

## 2022-08-26 PROCEDURE — 97112 NEUROMUSCULAR REEDUCATION: CPT | Performed by: PHYSICAL THERAPIST

## 2022-08-26 PROCEDURE — 99213 OFFICE O/P EST LOW 20 MIN: CPT | Performed by: ORTHOPAEDIC SURGERY

## 2022-08-26 NOTE — PROGRESS NOTES
Assessment/Plan:  1  Bilateral primary osteoarthritis of knee      Orders Placed This Encounter   Procedures    Brace    Ambulatory Referral to Pain Management       · Patient has severe bilateral knee osteoarthritis  · We had a lengthy discussion regarding total knee replacement in the setting of patients recent medical issues including post op MRSA infection, JANEL, anemia, Factor 5 Leiden, a fib on coumadin, previous DVT, current oral steroid use, current oral bactrim use  In light of these medical issues, we could consider total knee replacement 6 months after discontinuing the oral prednisone and bactrim if he is doing well  He will also need to stabilize his hemaglobin  · If TKA is considered, we would likely need PCP, cardiology, nephrology, hematology, and ID clearance  · We discussed other non operative options for the time being including injections and geniculate nerve ablation  Patient is interested in ablation  Referral placed for Pain Management to discuss ablation  · Patient can return for CSI once off of oral steroids  · Continue PT  Continue to work on knee strength and ROM  · Order placed for bilateral knee braces which he can wear for comfort  · Continue tylenol as needed for pain  No NSAIDs while on coumadin and with kidney function  No follow-ups on file  I answered all of the patient's questions during the visit and provided education of the patient's condition during the visit  The patient verbalized understanding of the information given and agrees with the plan  This note was dictated using [employer]Synker software  It may contain errors including improperly dictated words  Please contact physician directly for any questions  Subjective   Chief Complaint:   Chief Complaint   Patient presents with    Left Knee - Follow-up, Pain    Right Knee - Follow-up, Pain       HPI  Krunal Bruce is a 71 y o  male who presents for follow up for bilateral knee pain and severe OA  Patient as last seen on 2/24/22 at which time he signed up for R TKA  Since then, patient has had multiple hospital admissions  He underwent cervical spine surgery on 3/11/22 due to cord compression  He had a post op MRSA infection that required surgical washout on 4/1/22 and subsequent IV antibiotics  Patient was initially on IV vancomycin until 4/23/22 but was switched to daptomycin due to concerns over kidney function  Patient transitioned to oral doxycycline through 5/24/22  At that point patient was placed on oral prednisone and bactrim due to kidney issues  He is scheduled to stop the oral prednisone and bactrim next week per nephrology recommendations  He will begin epogen infusions in two weeks due to anemia  Patient also has Factor 5 Leiden and afib on coumadin  Patient reports that his knee pain has been constant throughout these other issues  He notes right knee pain worse than left knee pain typically  He will take tylenol as needed for pain  He has been getting PT since his cervical spine surgery but continues to notice deficits in his balance and strength  He is using a walker for assistance  He recalls relief with previous steroid injections but no relief with previous gel injections  Review of Systems  ROS:    See HPI for musculoskeletal review     All other systems reviewed are negative     History:  Past Medical History:   Diagnosis Date    Acute venous embolism and thrombosis of deep vessels of proximal lower extremity (HCC)     Last assessed: 5/18/15    Arthritis     Cellulitis     LE    Chronic kidney disease 3/2020    Acute Kidney Injury    CPAP (continuous positive airway pressure) dependence     Factor V Leiden ([address] )     Forgetfulness     Gross hematuria 5/17/2022    Headache(784 0) 3/2022    Never till cervical  spine surgery    Heart murmur 3/2020    Told when in hospital   [facility] (hard of hearing)     Hypoalbuminemia 5/11/2022    Paroxysmal atrial fibrillation ([address] ) Last assessed: 11/2/15    Sleep apnea      Past Surgical History:   Procedure Laterality Date    BACK SURGERY      Lower    COLON SURGERY      COLONOSCOPY      INCISION AND DRAINAGE POSTERIOR SPINE N/A 4/1/2022    Procedure: Posterior cervical evacuation of postoperative collection and debridement with placement of drains C3-T1; Surgeon: Elio Rhodes MD;  Location: [facility];  Service: Neurosurgery    IR BIOPSY KIDNEY RANDOM  5/26/2022    IR TUNNELED DIALYSIS CATHETER PLACEMENT  5/23/2022    IR TUNNELED DIALYSIS CATHETER REMOVAL  6/2/2022    AL ARTHRODESIS ANT INTERBODY MIN DISCECTOMY, CERVICAL BELOW C2 N/A 3/11/2022    Procedure: Anterior cervical discectomy and fixation fusion C5/6 and C6/7; Posterior cervical decompression and instrumented fusion C3-T1;   Surgeon: Elio Rhodes MD;  Location: [facility];  Service: Neurosurgery    SPINE SURGERY  3/11/2022    Cervical myelopathy/ cervical fusion     Social History   Social History     Substance and Sexual Activity   Alcohol Use Not Currently    Alcohol/week: 0 0 standard drinks     Social History     Substance and Sexual Activity   Drug Use No     Social History     Tobacco Use   Smoking Status Never Smoker   Smokeless Tobacco Never Used     Family History:   Family History   Problem Relation Age of Onset    Lung cancer Mother     Hearing loss Father     Heart attack Brother     Atrial fibrillation Brother     Emphysema Sister        Current Outpatient Medications on File Prior to Visit   Medication Sig Dispense Refill    Accu-Chek Softclix Lancets lancets Use as instructed daily to test sugar E11 9 (Patient not taking: No sig reported) 200 each 2    acetaminophen (TYLENOL) 325 mg tablet Take 3 tablets (975 mg total) by mouth 3 (three) times a day as needed for mild pain  0    amLODIPine (NORVASC) 5 mg tablet Take 1 tablet (5 mg total) by mouth daily 90 tablet 3    Blood Glucose Monitoring Suppl (Accu-Chek Guide) w/Device KIT (Patient not taking: No sig reported)      calcium carbonate-vitamin D (OSCAL-D) 500 mg-200 units per tablet Take 1 tablet by mouth daily with breakfast 30 tablet 0    doxazosin (CARDURA) 1 mg tablet Take 1 tablet (1 mg total) by mouth daily at bedtime 90 tablet 1    DULoxetine (CYMBALTA) 60 mg delayed release capsule Take 1 capsule (60 mg total) by mouth daily 90 capsule 1    flecainide (TAMBOCOR) 50 mg tablet Take 1 tablet (50 mg total) by mouth 2 (two) times a day 180 tablet 3    glucose blood (Accu-Chek Guide) test strip USE TO TEST AS DIRECTED (Patient not taking: No sig reported) 50 strip 2    methocarbamol (ROBAXIN) 750 mg tablet Take 1 tablet (750 mg total) by mouth every 6 (six) hours as needed for muscle spasms 30 tablet 0    metoprolol succinate (TOPROL-XL) 100 mg 24 hr tablet TAKE 1 TABLET BY MOUTH  DAILY 90 tablet 0    ondansetron (ZOFRAN) 4 mg tablet Take 1 tablet (4 mg total) by mouth every 8 (eight) hours as needed for nausea or vomiting 30 tablet 0    oxyCODONE (ROXICODONE) 5 immediate release tablet Take 1 tablet (5 mg total) by mouth every 6 (six) hours as needed for moderate pain Max Daily Amount: 20 mg 10 tablet 0    pantoprazole (PROTONIX) 40 mg tablet Take 1 tablet (40 mg total) by mouth 2 (two) times a day before meals 180 tablet 3    pravastatin (PRAVACHOL) 40 mg tablet TAKE 1 TABLET BY MOUTH  DAILY 90 tablet 1    predniSONE 20 mg tablet 60mg daily for 4 weeks, then 40mg daily x 2 weeks starting June 30th, 2022   Then 30mg daily x 2 weeks, 20mg daily x 2 weeks, 10mg daily x 2 weeks, 5mg daily 270 tablet 0    sulfamethoxazole-trimethoprim (BACTRIM DS) 800-160 mg per tablet Take 1 tablet by mouth 3 (three) times a week 36 tablet 3    tamsulosin (FLOMAX) 0 4 mg Take 1 capsule (0 4 mg total) by mouth daily with dinner 90 capsule 1    torsemide (DEMADEX) 20 mg tablet Take 1 tablet (20 mg total) by mouth daily 90 tablet 1    warfarin (COUMADIN) 5 mg tablet Take by mouth daily Take 2 5 mg (1/2 tablets) daily except Wednesday and Saturday take 5 mg daily  No current facility-administered medications on file prior to visit       Allergies   Allergen Reactions    Morphine GI Intolerance    Vitamin K Other (See Comments)     On coumadin-patient sensitive to vitamin K amounts in meds etc         Objective     /79   Pulse 78   Ht 5' 11" (1 803 m)   Wt 112 kg (246 lb 6 4 oz)   BMI 34 37 kg/m²      PE:  AAOx 3  WDWN  Hearing intact, no drainage from eyes  no audible wheezing  no abdominal distension  LE compartments soft, skin intact    Ortho Exam:  bilateral Knee:   No erythema  no swelling  no effusion  no warmth  Mild discoloration bilateral lower legs  AROM: right knee 3- 120, left knee 0- 120  Weakness with right knee extension   Stable to varus/valgus stress      Scribe Attestation    I,:  Nikki Florez PA-C am acting as a scribe while in the presence of the attending physician :       I,:  Claudeen Blizzard, DO personally performed the services described in this documentation    as scribed in my presence :

## 2022-08-26 NOTE — TELEPHONE ENCOUNTER
Called and spoke with patients wife Miguelito Price and informed her that Dr Svetlana Tyler would like Jyothi Razo to have a total of 4 appointments for his infusion  Miguelito Price verbally understood and stated she will schedule 2 more

## 2022-08-26 NOTE — PROGRESS NOTES
Daily Note     Today's date: 2022  Patient name: Antonio Chavez  : 1950  MRN: [id number]  Referring provider: Maribell Gong, PT  Dx:   Encounter Diagnosis     ICD-10-CM    1  Primary osteoarthritis of both knees  M17 0                   Subjective: Patient reports no adverse reaction to his LV  He reports having some increased soreness today but he continues to see improvements with hip strength  Objective: See treatment diary below      Assessment:   Stage: chronic   Stability of Symptoms:  At Evaluation: stable - unchanged  Global Rating of Change:   Symptom Irritability Level: high  Primary Movement Diagnosis: poor motor control   Patient Specific Functional Scale:   8/3/22: improve confidence with walking 0/10, Improve LE strength 0/10, learn exercises to improve endurance and activity level 0/10; Total 0  FOTO Prediction: 34 by visit 15  FOTO Progress: 8 at evaluation   Greatest Concern: declining function and fear of falling  Current Activity Plan: added to HEP (8/3); updated HEP and reiterated the importance of only performing HEP exercises to accurately determine his response to treatment ()  Current Educational Needs: progressions, safety of activity     Patient continues to respond well to manual treatment - had less pain and stiffness following  He demonstrated good form with exercises - no increased pain post treatment  Skilled PT continues to be required to guide progression of strength and control to allow him to return to walking with less pain and improved confidence and to improve his endurance for daily activities          Plan: Continue with POC - monitor tolerance to treatment and HEP     Daily Treatment Diary     DX: (B) Knee OA  EPOC: 22  Follow Up with Referring Provider: AVINASH  Precautions: NA  CO-MORBIDITIES: A-fib, Hx of DVT  HEP ACCESS CODE: [id number]       Treatment Diary        Manual Therapy         (B) Knee Traction 8 min 8 min 4 min      (B) Knee PROM   4 min                                                   Therapeutic Exercise  HEP         Recumbent Bike  4 min 5 min 6 min                Standing Hip Flex 8/3 Hold 10x ea 15x ea      Standing Hip ABD 8/3 Hold 10x ea 15x ea      Standing Hip Ext 8/3 Hold        HS Curls 8/3 Hold                   LAQ  15x ea 2# 20x ea 2# 20x ea      LFS: Alt LE  15x ea 20x ea 20x ea      S/L Clamshells  10x ea 20x ea 20x ea      Bridge  15x 20x 20x                Neuromuscular Reeducation                    Romberg Balance with Reach Matrix                    Foam Side stepping   6 Feet  3 laps                 Sharpened Romberg Balance                    FWD Mini Lunge          LAT Mini Lunge                              Therapeutic Activity                              Gait Training                                        Modalities

## 2022-08-29 ENCOUNTER — HOSPITAL ENCOUNTER (OUTPATIENT)
Dept: INFUSION CENTER | Facility: HOSPITAL | Age: 72
Discharge: HOME | End: 2022-08-29
Attending: INTERNAL MEDICINE
Payer: COMMERCIAL

## 2022-08-29 VITALS
OXYGEN SATURATION: 98 % | TEMPERATURE: 96.3 F | DIASTOLIC BLOOD PRESSURE: 63 MMHG | HEART RATE: 58 BPM | SYSTOLIC BLOOD PRESSURE: 132 MMHG | RESPIRATION RATE: 14 BRPM

## 2022-08-29 DIAGNOSIS — D63.1 ANEMIA DUE TO STAGE 4 CHRONIC KIDNEY DISEASE (HCC): ICD-10-CM

## 2022-08-29 DIAGNOSIS — N05.9 GLOMERULONEPHRITIS: Primary | ICD-10-CM

## 2022-08-29 DIAGNOSIS — N18.4 ANEMIA DUE TO STAGE 4 CHRONIC KIDNEY DISEASE (HCC): ICD-10-CM

## 2022-08-29 PROCEDURE — 96372 THER/PROPH/DIAG INJ SC/IM: CPT

## 2022-08-29 RX ADMIN — EPOETIN ALFA 11000 UNITS: 4000 SOLUTION INTRAVENOUS; SUBCUTANEOUS at 13:39

## 2022-08-29 NOTE — PROGRESS NOTES
Patient received Epogen SQ, left x2, right x1  Next appointments made, schedule provided  Left unit ambulatory with walker, accompanied by wife

## 2022-08-31 ENCOUNTER — TELEPHONE (OUTPATIENT)
Dept: NEPHROLOGY | Facility: CLINIC | Age: 72
End: 2022-08-31

## 2022-08-31 ENCOUNTER — PATIENT OUTREACH (OUTPATIENT)
Dept: FAMILY MEDICINE CLINIC | Facility: CLINIC | Age: 72
End: 2022-08-31

## 2022-08-31 ENCOUNTER — ANTICOAG VISIT (OUTPATIENT)
Dept: FAMILY MEDICINE CLINIC | Facility: CLINIC | Age: 72
End: 2022-08-31

## 2022-08-31 ENCOUNTER — OFFICE VISIT (OUTPATIENT)
Dept: PHYSICAL THERAPY | Facility: CLINIC | Age: 72
End: 2022-08-31
Payer: COMMERCIAL

## 2022-08-31 DIAGNOSIS — G47.00 INSOMNIA, UNSPECIFIED TYPE: Primary | ICD-10-CM

## 2022-08-31 DIAGNOSIS — M17.0 PRIMARY OSTEOARTHRITIS OF BOTH KNEES: Primary | ICD-10-CM

## 2022-08-31 PROCEDURE — 97110 THERAPEUTIC EXERCISES: CPT | Performed by: PHYSICAL THERAPIST

## 2022-08-31 PROCEDURE — 97140 MANUAL THERAPY 1/> REGIONS: CPT | Performed by: PHYSICAL THERAPIST

## 2022-08-31 NOTE — PROGRESS NOTES
Daily Note     Today's date: 2022  Patient name: Enrique العراقي  : 1950  MRN: [id number]  Referring provider: Brigette Bull, PT  Dx:   Encounter Diagnosis     ICD-10-CM    1  Primary osteoarthritis of both knees  M17 0                   Subjective: Patient reports he is very achy today  He notes his hemoglobin is low today and he is waiting for a call for a transfusion  Objective: See treatment diary below      Assessment:   Stage: chronic   Stability of Symptoms:  At Evaluation: stable - unchanged  Global Rating of Change:   Symptom Irritability Level: high  Primary Movement Diagnosis: poor motor control   Patient Specific Functional Scale:   8/3/22: improve confidence with walking 0/10, Improve LE strength 0/10, learn exercises to improve endurance and activity level 0/10; Total   FOTO Prediction: 34 by visit 15  FOTO Progress: 8 at evaluation   Greatest Concern: declining function and fear of falling  Current Activity Plan: added to HEP (8/3); updated HEP and reiterated the importance of only performing HEP exercises to accurately determine his response to treatment ()  Current Educational Needs: progressions, safety of activity     Patient continues to show slight improvements with pain levels and ROM following manual treatment  He had good overall tolerance to modified exercise program today  Skilled PT continues to be required to guide progression of strength and control to allow him to return to walking with less pain and improved confidence and to improve his endurance for daily activities          Plan: Continue with POC - monitor tolerance to treatment and HEP     Daily Treatment Diary     DX: (B) Knee OA  EPOC: 22  Follow Up with Referring Provider: AVINASH  Precautions: NA  CO-MORBIDITIES: A-fib, Hx of DVT  HEP ACCESS CODE: [id number]       Treatment Diary       Manual Therapy         (B) Knee Traction 8 min 8 min 4 min 4 min     (B) Knee PROM   4 min 4 min Therapeutic Exercise  HEP         Recumbent Bike  4 min 5 min 6 min 6 min               Standing Hip Flex 8/3 Hold 10x ea 15x ea      Standing Hip ABD 8/3 Hold 10x ea 15x ea      Standing Hip Ext 8/3 Hold        HS Curls 8/3 Hold                   LAQ  15x ea 2# 20x ea 2# 20x ea 2# 20x ea     LFS: Alt LE  15x ea 20x ea 20x ea 20x ea     S/L Clamshells  10x ea 20x ea 20x ea 20x ea     Bridge  15x 20x 20x 20x               Neuromuscular Reeducation                    Romberg Balance with Reach Matrix                    Foam Side stepping   6 Feet  3 laps                 Sharpened Romberg Balance                    FWD Mini Lunge          LAT Mini Lunge                              Therapeutic Activity                              Gait Training                                        Modalities

## 2022-08-31 NOTE — PROGRESS NOTES
Outpatient Care Management Note:    Voice mail message received from Darvin returning my calls from several weeks ago  PEBBLES called Darvin back and spoke with his wife, Rhoda Kyle  She states that Darvin is maintaining  He is now getting epogen injections and has needed blood transfusions  She notes that his creatinine has improved a little  He has been following closely with nephrology  Rhoda Kyle denies any current needs  They will keep my contact information and will call with any questions  CM will not outreach at this time

## 2022-09-01 DIAGNOSIS — D64.9 ANEMIA, UNSPECIFIED TYPE: Primary | ICD-10-CM

## 2022-09-01 RX ORDER — ZOLPIDEM TARTRATE 10 MG/1
10 TABLET ORAL
Qty: 90 TABLET | Refills: 3 | Status: SHIPPED | OUTPATIENT
Start: 2022-09-01

## 2022-09-01 NOTE — TELEPHONE ENCOUNTER
Reviewed labs with patient and his wife, Sixto Bello  Cr improving  Urine protein level improved  Will continue weekly monitoring of BMP, UA and UpCr  Has begun epogen  Will continue q2 week CBC checks  All questions answered

## 2022-09-02 ENCOUNTER — OFFICE VISIT (OUTPATIENT)
Dept: PHYSICAL THERAPY | Facility: CLINIC | Age: 72
End: 2022-09-02
Payer: COMMERCIAL

## 2022-09-02 DIAGNOSIS — M17.0 PRIMARY OSTEOARTHRITIS OF BOTH KNEES: Primary | ICD-10-CM

## 2022-09-02 PROCEDURE — 97140 MANUAL THERAPY 1/> REGIONS: CPT | Performed by: PHYSICAL THERAPIST

## 2022-09-02 PROCEDURE — 97110 THERAPEUTIC EXERCISES: CPT | Performed by: PHYSICAL THERAPIST

## 2022-09-02 NOTE — PROGRESS NOTES
Daily Note     Today's date: 2022  Patient name: Harry Fan  : 1950  MRN: [id number]  Referring provider: Mely Lancaster, PT  Dx:   Encounter Diagnosis     ICD-10-CM    1  Primary osteoarthritis of both knees  M17 0                 Subjective: Patient reports his knees have been feeling a little better over the past few days  He has been using the knee braces less with good overall tolerance to walking  He reports he is ready to transition to working on his lower back in PT  Objective: See treatment diary below      Assessment:   Stage: chronic   Stability of Symptoms:  At Evaluation: stable - unchanged  Global Rating of Change:   Symptom Irritability Level: high  Primary Movement Diagnosis: poor motor control   Patient Specific Functional Scale:   8/3/22: improve confidence with walking 0/10, Improve LE strength 0/10, learn exercises to improve endurance and activity level 0/10; Total 0  FOTO Prediction: 34 by visit 15  FOTO Progress: 8 at evaluation   Greatest Concern: declining function and fear of falling  Current Activity Plan: added to HEP (8/3); updated HEP and reiterated the importance of only performing HEP exercises to accurately determine his response to treatment ()  Current Educational Needs: progressions, safety of activity     Patient demonstrated good form with exercises throughout treatment  He was challenged with current level of exercise  At this time it is recommended that he continue with an I HEP  He is to contact me if he has return of symptoms or any questions/ concerns  Plan: DC to I HEP    Evaluate lower back NV     Daily Treatment Diary     DX: (B) Knee OA  EPOC: 22  Follow Up with Referring Provider: AVINASH  Precautions: NA  CO-MORBIDITIES: A-fib, Hx of DVT  HEP ACCESS CODE: [id number]       Treatment Diary      Manual Therapy         (B) Knee Traction 8 min 8 min 4 min 4 min 4 min    (B) Knee PROM   4 min 4 min 4 min Therapeutic Exercise  HEP         Recumbent Bike  4 min 5 min 6 min 6 min 6 min              Standing Hip Flex 8/3 Hold 10x ea 15x ea  15x ea    Standing Hip ABD 8/3 Hold 10x ea 15x ea  15x ea    Standing Hip Ext 8/3 Hold        HS Curls 8/3 Hold                   LAQ  15x ea 2# 20x ea 2# 20x ea 2# 20x ea 2# 20x ea    LFS: Alt LE  15x ea 20x ea 20x ea 20x ea 20x ea    S/L Clamshells  10x ea 20x ea 20x ea 20x ea 20x ea    Bridge  15x 20x 20x 20x 5"x20              Neuromuscular Reeducation                    Romberg Balance with Reach Matrix                    Foam Side stepping   6 Feet  3 laps                 Sharpened Romberg Balance                    FWD Mini Lunge          LAT Mini Lunge                              Therapeutic Activity                              Gait Training                                        Modalities

## 2022-09-07 ENCOUNTER — ANTICOAG VISIT (OUTPATIENT)
Dept: FAMILY MEDICINE CLINIC | Facility: CLINIC | Age: 72
End: 2022-09-07

## 2022-09-07 ENCOUNTER — TELEPHONE (OUTPATIENT)
Dept: PAIN MEDICINE | Facility: CLINIC | Age: 72
End: 2022-09-07

## 2022-09-07 LAB
BASOPHILS # BLD AUTO: 0.1 X10E3/UL (ref 0–0.2)
BASOPHILS NFR BLD AUTO: 1 %
EOSINOPHIL # BLD AUTO: 0.1 X10E3/UL (ref 0–0.4)
EOSINOPHIL NFR BLD AUTO: 2 %
ERYTHROCYTE [DISTWIDTH] IN BLOOD BY AUTOMATED COUNT: 14.4 % (ref 11.6–15.4)
HCT VFR BLD AUTO: 22.6 % (ref 37.5–51)
HGB BLD-MCNC: 7.4 G/DL (ref 13–17.7)
IMM GRANULOCYTES # BLD: 0 X10E3/UL (ref 0–0.1)
IMM GRANULOCYTES NFR BLD: 1 %
LYMPHOCYTES # BLD AUTO: 1.2 X10E3/UL (ref 0.7–3.1)
LYMPHOCYTES NFR BLD AUTO: 22 %
MCH RBC QN AUTO: 30 PG (ref 26.6–33)
MCHC RBC AUTO-ENTMCNC: 32.7 G/DL (ref 31.5–35.7)
MCV RBC AUTO: 92 FL (ref 79–97)
MONOCYTES # BLD AUTO: 0.5 X10E3/UL (ref 0.1–0.9)
MONOCYTES NFR BLD AUTO: 8 %
NEUTROPHILS # BLD AUTO: 3.7 X10E3/UL (ref 1.4–7)
NEUTROPHILS NFR BLD AUTO: 66 %
PLATELET # BLD AUTO: 254 X10E3/UL (ref 150–450)
RBC # BLD AUTO: 2.47 X10E6/UL (ref 4.14–5.8)
WBC # BLD AUTO: 5.6 X10E3/UL (ref 3.4–10.8)

## 2022-09-07 NOTE — TELEPHONE ENCOUNTER
PATIENT WAS REFERRED BY DR Kiara Swain FOR KNEE INJECTIONS. CONSULT OR SCHEDULE INJECTION?  # [phone]

## 2022-09-08 ENCOUNTER — APPOINTMENT (OUTPATIENT)
Dept: PHYSICAL THERAPY | Facility: CLINIC | Age: 72
End: 2022-09-08
Payer: COMMERCIAL

## 2022-09-12 ENCOUNTER — HOSPITAL ENCOUNTER (OUTPATIENT)
Dept: INFUSION CENTER | Facility: HOSPITAL | Age: 72
Discharge: HOME | End: 2022-09-12
Attending: INTERNAL MEDICINE
Payer: COMMERCIAL

## 2022-09-12 VITALS
WEIGHT: 248.9 LBS | HEART RATE: 61 BPM | TEMPERATURE: 96.8 F | DIASTOLIC BLOOD PRESSURE: 59 MMHG | SYSTOLIC BLOOD PRESSURE: 122 MMHG | RESPIRATION RATE: 16 BRPM | BODY MASS INDEX: 34.71 KG/M2 | OXYGEN SATURATION: 97 %

## 2022-09-12 DIAGNOSIS — N18.4 ANEMIA DUE TO STAGE 4 CHRONIC KIDNEY DISEASE (HCC): ICD-10-CM

## 2022-09-12 DIAGNOSIS — N05.9 GLOMERULONEPHRITIS: Primary | ICD-10-CM

## 2022-09-12 DIAGNOSIS — D63.1 ANEMIA DUE TO STAGE 4 CHRONIC KIDNEY DISEASE (HCC): ICD-10-CM

## 2022-09-12 DIAGNOSIS — I48.0 PAROXYSMAL A-FIB (HCC): Primary | ICD-10-CM

## 2022-09-12 PROCEDURE — 96372 THER/PROPH/DIAG INJ SC/IM: CPT

## 2022-09-12 RX ORDER — WARFARIN SODIUM 5 MG/1
TABLET ORAL
Qty: 90 TABLET | Refills: 1 | Status: SHIPPED | OUTPATIENT
Start: 2022-09-12

## 2022-09-12 RX ADMIN — EPOETIN ALFA 11000 UNITS: 4000 SOLUTION INTRAVENOUS; SUBCUTANEOUS at 15:14

## 2022-09-12 NOTE — TELEPHONE ENCOUNTER
Pharmacy faxed over a request for "Gabapentin Cap" but prescription is not listed  [facility] [phone]

## 2022-09-12 NOTE — PROGRESS NOTES
Pt arrived amb with walker with his wife for Epogen injections  Pt c/o fatigue, appears pale  Wife asking for Epogen appts to be changed to Wednesdays because he gets weekly labs at [facility] on Mondays, and we won't have results  Appts changed  Epogen given SQ both upper arms  Dsd applied  Disch amb to home with walker , slow gait

## 2022-09-13 ENCOUNTER — TELEPHONE (OUTPATIENT)
Dept: NEPHROLOGY | Facility: HOSPITAL | Age: 72
End: 2022-09-13

## 2022-09-13 ENCOUNTER — ANTICOAG VISIT (OUTPATIENT)
Dept: FAMILY MEDICINE CLINIC | Facility: CLINIC | Age: 72
End: 2022-09-13

## 2022-09-13 LAB
APPEARANCE UR: CLEAR
BACTERIA URNS QL MICRO: ABNORMAL
BILIRUB UR QL STRIP: NEGATIVE
BUN SERPL-MCNC: 47 MG/DL (ref 8–27)
BUN/CREAT SERPL: 15 (ref 10–24)
CALCIUM SERPL-MCNC: 9.3 MG/DL (ref 8.6–10.2)
CASTS URNS QL MICRO: ABNORMAL /LPF
CHLORIDE SERPL-SCNC: 100 MMOL/L (ref 96–106)
CO2 SERPL-SCNC: 23 MMOL/L (ref 20–29)
COLOR UR: YELLOW
CREAT SERPL-MCNC: 3.11 MG/DL (ref 0.76–1.27)
CREAT UR-MCNC: 27.3 MG/DL
EGFR: 21 ML/MIN/1.73
EPI CELLS #/AREA URNS HPF: ABNORMAL /HPF (ref 0–10)
GLUCOSE SERPL-MCNC: 108 MG/DL (ref 65–99)
GLUCOSE UR QL: NEGATIVE
HGB UR QL STRIP: ABNORMAL
INR PPP: 2.5 (ref 0.9–1.2)
KETONES UR QL STRIP: NEGATIVE
LEUKOCYTE ESTERASE UR QL STRIP: NEGATIVE
MICRO URNS: ABNORMAL
NITRITE UR QL STRIP: NEGATIVE
PH UR STRIP: 6 [PH] (ref 5–7.5)
PHOSPHATE SERPL-MCNC: 4.4 MG/DL (ref 2.8–4.1)
POTASSIUM SERPL-SCNC: 4 MMOL/L (ref 3.5–5.2)
PROT UR QL STRIP: ABNORMAL
PROT UR-MCNC: 30 MG/DL
PROT/CREAT UR: 1099 MG/G CREAT (ref 0–200)
PROTHROMBIN TIME: 24.7 SEC (ref 9.1–12)
RBC #/AREA URNS HPF: >30 /HPF (ref 0–2)
SODIUM SERPL-SCNC: 141 MMOL/L (ref 134–144)
SP GR UR: 1.01 (ref 1–1.03)
UROBILINOGEN UR STRIP-ACNC: 0.2 MG/DL (ref 0.2–1)
WBC #/AREA URNS HPF: ABNORMAL /HPF (ref 0–5)

## 2022-09-13 NOTE — TELEPHONE ENCOUNTER
Spoke to Nikki Rainey, and made her aware of improving serum cr, proteinuria stable but will continue with weekly labs  - cbc was not completed I confirmed with Dontae Marie will be faxed to [phone]- lm for Nikki Rainey and Miley Ring

## 2022-09-13 NOTE — TELEPHONE ENCOUNTER
----- Message from Cheko Staples DO sent at 9/13/2022  2:23 PM EDT -----  Serum Cr is improving  Please let patient know  Urine protein present but relatively stable  Should have weekly labs continued before office visit with me 9/27/22

## 2022-09-14 ENCOUNTER — OFFICE VISIT (OUTPATIENT)
Dept: PHYSICAL THERAPY | Facility: CLINIC | Age: 72
End: 2022-09-14
Payer: COMMERCIAL

## 2022-09-14 DIAGNOSIS — M54.50 CHRONIC MIDLINE LOW BACK PAIN WITHOUT SCIATICA: Primary | ICD-10-CM

## 2022-09-14 DIAGNOSIS — G89.29 CHRONIC MIDLINE LOW BACK PAIN WITHOUT SCIATICA: Primary | ICD-10-CM

## 2022-09-14 LAB
EXT DIFF-ABS BASOPHILS: 0
EXT DIFF-ABS EOSINOPHILS: 0.1
EXT DIFF-ABS LYMPHOCYTES: 1.2
EXT DIFF-ABS MONOCYTES: 0.5
EXT DIFF-ABS NEUTROPHILS: 4
EXTERNAL HEMATOCRIT: 23 %
EXTERNAL HEMOGLOBIN: 7.6 G/DL
EXTERNAL MCV: 92
EXTERNAL PLATELET COUNT: 219 K/ΜL
EXTERNAL RBC: 2.5
EXTERNAL RDW: 13.9
EXTERNAL WBC: 5.9
MCH RBC BLDCO QN: 30.4 PG (ref 27–34)
MCHC. (HISTORICAL): 33

## 2022-09-14 PROCEDURE — 97110 THERAPEUTIC EXERCISES: CPT | Performed by: PHYSICAL THERAPIST

## 2022-09-14 PROCEDURE — 97162 PT EVAL MOD COMPLEX 30 MIN: CPT | Performed by: PHYSICAL THERAPIST

## 2022-09-14 NOTE — PROGRESS NOTES
PT Evaluation     Today's date: 2022  Patient name: Jailene Mello  : 1950  MRN: [id number]  Referring provider: Milind Torres PT  Dx:   Encounter Diagnosis     ICD-10-CM    1  Chronic midline low back pain without sciatica  M54 50     G89 29                   Assessment  Assessment details: Jailene Mello is a 71 y o  male presenting to outpatient physical therapy with chief complaints of chronic LBP  Patient describes chronic lower back pain that has worsened with inactivity over the summer  Patient displays with abnormal gait, poor balance, lumbar AROM restriction, (B) LE strength deficits, preference for unloaded positions, and functional restrictions  Patient's symptoms are multifactorial in nature with a primary movement diagnosis of lumbar hypomobility resulting in pathoanatomical signs and symptoms consistent with Chronic midline low back pain without sciatica  (primary encounter diagnosis) resulting in limitations in his ability to walk without assistance and perform all transitional movements without pain  No further referral appears necessary at this time based upon examination results  Please contact me if you have any questions ([phone])  Thank you for the opportunity to share in the care of this patient  Impairments: abnormal gait, abnormal or restricted ROM, activity intolerance, impaired physical strength, lacks appropriate home exercise program and pain with function    Symptom irritability: moderateUnderstanding of Dx/Px/POC: good   Prognosis: fair  Prognosis details: Positive prognostic indicators include positive attitude toward recovery, motivated to improve, good understanding of condition, realistic expectations  Negative prognostic indicators include chronicity of symptoms  Goals  STG to be met in 3 weeks:  - Increase Lumbar AROM: minimal to moderate restrictions  - Increase (B) LE strength by 1/2 MMT grade    - I with HEP   - Patient will be able to perform sit to stand transitions without pain  LTG to be met in 6 weeks:  - Increase Lumbar AROM: minimal to no restrictions  - Increase (B) LE strength to 4+/5 all planes  - I with updated HEP   - Patient will be able to return to walking without AD  Plan  Plan details: Prognosis above is given PT services 2x/week tapering to 1x/week over the next 6 weeks and home program adherence  Patient would benefit from: skilled physical therapy  Planned modality interventions: cryotherapy and thermotherapy: hydrocollator packs  Planned therapy interventions: activity modification, joint mobilization, manual therapy, neuromuscular re-education, patient education, postural training, strengthening, stretching, therapeutic exercise, therapeutic activities, functional ROM exercises, home exercise program, gait training and body mechanics training  Plan of Care beginning date: 2022  Plan of Care expiration date: 10/26/2022  Treatment plan discussed with: patient        Subjective Evaluation    History of Present Illness  Mechanism of injury: At Evaluation (2022): Patient reports chronic back pain for many years  He reports since this past summer following his cervical surgery he had increased lower back pain  He notes no radiating pain down the legs  He was being treated for knee OA in PT and asked to be evaluated for the spine       Red Flag Screen:  Patient denies recent fever, changes in bowel and bladder function, nausea and vomiting, weakness, unexplained weight loss, tingling, numbness, pain with coughing, or traumatic ALYSON       Greatest concern: declining function    Quality of life: good    Pain  Current pain ratin  At best pain ratin  At worst pain ratin  Location: Central lower back  Quality: tight, dull ache and burning    Social Support  Steps to enter house: yes  Stairs in house: yes   Lives in: multiple-level home  Lives with: spouse    Employment status: not working    Diagnostic Tests  No diagnostic tests performed  Treatments  Previous treatment: medication  Patient Goals  Patient goal: Patient Specific Functional Scale: walk in the house without assistance 0/10, perform all transitional movements without increased pain 0/10, perform outdoor activities with minimal discomfort 0/10; Total 0/30        Objective     Static Posture   General Observations  Symmetrical weight bearing  Lumbar Spine   Flattened  Postural Observations  Seated posture: fair  Standing posture: fair        Palpation     Additional Palpation Details  Minimal tenderness to palpation throughout lumbar spine    Active Range of Motion     Lumbar   Flexion:  Restriction level: moderate  Extension:  Restriction level: moderate    Additional Active Range of Motion Details  (B) SG: moderate restriction    Strength/Myotome Testing     Left Hip   Planes of Motion   Flexion: 4-    Right Hip   Planes of Motion   Flexion: 4-    Left Knee   Flexion: 4+  Extension: 4    Right Knee   Flexion: 4+  Extension: 4    Left Ankle/Foot   Dorsiflexion: 4+  Plantar flexion: 4  Great toe extension: 4    Right Ankle/Foot   Dorsiflexion: 4+  Plantar flexion: 4  Great toe extension: 4    Tests     Lumbar   Negative SIJ compression and sacroiliac distraction  Left   Positive passive SLR  Negative crossed SLR and slump test      Right   Negative crossed SLR, passive SLR and slump test      Additional Tests Details  Static Position Testing:   Hooklying position: Decreased pain  PPT with Hooklying position: Decreased pain    Repeated Movement Testing:   Repeated Flexion in Sitting: Decreased, Better - improved lumbar flexion mobility      Ambulation     Observational Gait   Gait: antalgic   Decreased walking speed, left step length and right step length     Left foot contact pattern: foot flat  Right foot contact pattern: foot flat    Additional Observational Gait Details  FW Walker - step to pattern Daily Treatment Diary     DX: chronic LBP  EPOC: 10/26/22  Follow Up with Referring Provider:   Precautions: NA  CO-MORBIDITIES: A-fib, Hx of DVT  HEP ACCESS CODE: [id number]    Stage: chronic   Stability of Symptoms:  At Evaluation: stable - unchanged  Global Rating of Change:   Symptom Irritability Level: moderate  Primary Movement Diagnosis: lumbar hypomobility   Patient Specific Functional Scale:   9/14/22: walk in the house without assistance 0/10, perform all transitional movements without increased pain 0/10, perform outdoor activities with minimal discomfort 0/10;  Total 0/30  FOTO Prediction: 51 by visit 11   FOTO Progress: 40 at evaluation   Greatest Concern: declining function  Current Activity Plan: added to HEP (9/14)  Current Educational Needs: progressions      Treatment Diary          Manual Therapy         Lumbar Mobs - Sidelying                                                                Therapeutic Exercise  HEP         Recumbent Bike                    Seated Lumbar Flex 9/14         Abdominal Brace 9/14         LFS: Alt LE 9/14         Hip ADD Isometric 9/14         BKFO 9/14         Sidelying Clamshells 9/14         Bridge 9/14         LAQ                    SKTC Stretch          DKTC Stretch                    Neuromuscular Reeducation          Foam Sidestepping          LE Cone Taps          RB Balance                    TB Counter Balance                    FWD Step Up          LAT Step Up                                        Therapeutic Activity                              Gait Training                                        Modalities

## 2022-09-15 ENCOUNTER — HOSPITAL ENCOUNTER (OUTPATIENT)
Dept: BONE DENSITY | Facility: IMAGING CENTER | Age: 72
Discharge: HOME | End: 2022-09-15
Payer: COMMERCIAL

## 2022-09-15 DIAGNOSIS — Z13.820 ENCOUNTER FOR SCREENING FOR OSTEOPOROSIS: ICD-10-CM

## 2022-09-15 DIAGNOSIS — N02.8 IGA NEPHROPATHY DETERMINED BY BIOPSY OF KIDNEY: ICD-10-CM

## 2022-09-15 PROCEDURE — 77080 DXA BONE DENSITY AXIAL: CPT

## 2022-09-16 ENCOUNTER — OFFICE VISIT (OUTPATIENT)
Dept: PHYSICAL THERAPY | Facility: CLINIC | Age: 72
End: 2022-09-16
Payer: COMMERCIAL

## 2022-09-16 ENCOUNTER — TELEPHONE (OUTPATIENT)
Dept: PAIN MEDICINE | Facility: CLINIC | Age: 72
End: 2022-09-16

## 2022-09-16 DIAGNOSIS — G89.29 CHRONIC MIDLINE LOW BACK PAIN WITHOUT SCIATICA: Primary | ICD-10-CM

## 2022-09-16 DIAGNOSIS — M54.50 CHRONIC MIDLINE LOW BACK PAIN WITHOUT SCIATICA: Primary | ICD-10-CM

## 2022-09-16 PROCEDURE — 97140 MANUAL THERAPY 1/> REGIONS: CPT | Performed by: PHYSICAL THERAPIST

## 2022-09-16 PROCEDURE — 97110 THERAPEUTIC EXERCISES: CPT | Performed by: PHYSICAL THERAPIST

## 2022-09-16 NOTE — PROGRESS NOTES
Daily Note     Today's date: 2022  Patient name: Sunny Hoskins  : 1950  MRN: [id number]  Referring provider: Rylee Hawk, YUMIKO  Dx:   Encounter Diagnosis     ICD-10-CM    1  Chronic midline low back pain without sciatica  M54 50     G89 29                   Subjective: Patient reports good overall tolerance to his IE and HEP  He reports his back is feeling a little better - has more discomfort in the cervical spine and knees currently  Objective: See treatment diary below      Assessment:   Stage: chronic   Stability of Symptoms:  At Evaluation: stable - unchanged  Global Rating of Change:   Symptom Irritability Level: moderate  Primary Movement Diagnosis: lumbar hypomobility   Patient Specific Functional Scale:   22: walk in the house without assistance 0/10, perform all transitional movements without increased pain 0/10, perform outdoor activities with minimal discomfort 0/10; Total 0/30  FOTO Prediction: 51 by visit 11   FOTO Progress: 40 at evaluation   Greatest Concern: declining function  Current Activity Plan: added to HEP ()  Current Educational Needs: progressions    Patient had good tolerance to manual treatment - noted less discomfort post treatment  He demonstrated good overall form with mat-based exercises  He was challenged with balance and strengthening exercises  Skilled PT continues to be required to guide progression with lumbar mobility and strength to allow him to perform sit to stand and outdoor activities with less pain and difficulty  Plan: Continue with POC - monitor tolerance to treatment - progress as able NV        Daily Treatment Diary     DX: chronic LBP  EPOC: 10/26/22  Follow Up with Referring Provider:   Precautions: NA  CO-MORBIDITIES: A-fib, Hx of DVT  HEP ACCESS CODE: [id number]      Treatment Diary          Manual Therapy         Lumbar Mobs - Sidelying  Gr 2/3  8 min Therapeutic Exercise  HEP         Recumbent Bike  6 min                  Seated Lumbar Flex 9/14 30"x3        Abdominal Brace 9/14 5"x15        LFS: Alt LE 9/14 15x ea        Hip ADD Isometric 9/14 5"x15        BKFO 9/14 15x ea        Sidelying Clamshells 9/14 15x ea        Bridge 9/14 5"x15        LAQ                    SKTC Stretch  20"x3 ea        DKTC Stretch                    Neuromuscular Reeducation          Foam Sidestepping  6 Feet  3 laps        LE Cone Taps  2 Cones with UE Support  15x ea        RB Balance                    TB Counter Balance                    FWD Step Up          LAT Step Up                                        Therapeutic Activity                              Gait Training                                        Modalities

## 2022-09-20 ENCOUNTER — OFFICE VISIT (OUTPATIENT)
Dept: PHYSICAL THERAPY | Facility: CLINIC | Age: 72
End: 2022-09-20
Payer: COMMERCIAL

## 2022-09-20 ENCOUNTER — ANTICOAG VISIT (OUTPATIENT)
Dept: FAMILY MEDICINE CLINIC | Facility: CLINIC | Age: 72
End: 2022-09-20

## 2022-09-20 DIAGNOSIS — M54.50 CHRONIC MIDLINE LOW BACK PAIN WITHOUT SCIATICA: Primary | ICD-10-CM

## 2022-09-20 DIAGNOSIS — G89.29 CHRONIC MIDLINE LOW BACK PAIN WITHOUT SCIATICA: Primary | ICD-10-CM

## 2022-09-20 PROCEDURE — 97110 THERAPEUTIC EXERCISES: CPT | Performed by: PHYSICAL THERAPIST

## 2022-09-20 PROCEDURE — 97112 NEUROMUSCULAR REEDUCATION: CPT | Performed by: PHYSICAL THERAPIST

## 2022-09-20 NOTE — PROGRESS NOTES
Daily Note     Today's date: 2022  Patient name: Wanda Brewer  : 1950  MRN: [id number]  Referring provider: Nilson Hudson PT  Dx:   Encounter Diagnosis     ICD-10-CM    1  Chronic midline low back pain without sciatica  M54 50     G89 29                   Subjective: Patient reports no adverse reaction to his LV  He notes his lower back is feeling a little better  He had a great weekend - feeling better overall  Today his knees are achy but overall not too painful  HEP is going well  Objective: See treatment diary below      Assessment:   Stage: chronic   Stability of Symptoms:  At Evaluation: stable - unchanged  Global Rating of Change:   Symptom Irritability Level: moderate  Primary Movement Diagnosis: lumbar hypomobility   Patient Specific Functional Scale:   22: walk in the house without assistance 0/10, perform all transitional movements without increased pain 0/10, perform outdoor activities with minimal discomfort 0/10; Total 0/30  FOTO Prediction: 51 by visit 11   FOTO Progress: 40 at evaluation   Greatest Concern: declining function  Current Activity Plan: added to HEP ()  Current Educational Needs: progressions    Patient continues to respond well to manual treatment - decreased pain post treatment  He tolerated exercise progressions well - no complaints of increased pain and minimal fatigue noted post treatment  He was encouraged to continue progressing his activity at home - paying attention to how tired he gets after and the next day  Skilled PT continues to be required to guide progression with lumbar mobility and strength to allow him to perform sit to stand and outdoor activities with less pain and difficulty  Plan: Continue with POC - monitor tolerance to treatment - progress as able NV        Daily Treatment Diary     DX: chronic LBP  EPOC: 10/26/22  Follow Up with Referring Provider:   Precautions: NA  CO-MORBIDITIES: A-fib, Hx of DVT  HEP ACCESS CODE: [facility]      Treatment Diary  9/16 9/20       Manual Therapy         Lumbar Mobs - Sidelying  Gr 2/3  8 min Gr 2/3  8 min                                                             Therapeutic Exercise  HEP         Recumbent Bike  6 min 6 min                 Seated Lumbar Flex 9/14 30"x3        Abdominal Brace 9/14 5"x15 5"x15       LFS: Alt LE 9/14 15x ea 20x ea       Hip ADD Isometric 9/14 5"x15 5'x15       BKFO 9/14 15x ea        Sidelying Clamshells 9/14 15x ea 15x ea       Bridge 9/14 5"x15 5"x15       LAQ   4# 20x ea                 SKTC Stretch  20"x3 ea        DKTC Stretch                    Neuromuscular Reeducation          Foam Sidestepping  6 Feet  3 laps 6 Feet  3 laps       LE Cone Taps  2 Cones with UE Support  15x ea 2 Cones with UE Support  15x ea       RB Balance                    TB Counter Balance                    FWD Step Up          LAT Step Up                    Standing Hip Flex   20x ea       Standing Hip ABD   20x ea                                     Therapeutic Activity                              Gait Training                                        Modalities

## 2022-09-22 DIAGNOSIS — N05.9 GLOMERULONEPHRITIS: Primary | ICD-10-CM

## 2022-09-22 DIAGNOSIS — N18.4 ANEMIA DUE TO STAGE 4 CHRONIC KIDNEY DISEASE (HCC): ICD-10-CM

## 2022-09-22 DIAGNOSIS — D63.1 ANEMIA DUE TO STAGE 4 CHRONIC KIDNEY DISEASE (HCC): ICD-10-CM

## 2022-09-23 DIAGNOSIS — R60.9 EDEMA, UNSPECIFIED TYPE: ICD-10-CM

## 2022-09-23 RX ORDER — TORSEMIDE 20 MG/1
20 TABLET ORAL DAILY
Qty: 90 TABLET | Refills: 1 | Status: SHIPPED | OUTPATIENT
Start: 2022-09-23

## 2022-09-26 ENCOUNTER — TELEPHONE (OUTPATIENT)
Dept: FAMILY MEDICINE CLINIC | Facility: CLINIC | Age: 72
End: 2022-09-26

## 2022-09-26 ENCOUNTER — TELEPHONE (OUTPATIENT)
Dept: NEPHROLOGY | Facility: CLINIC | Age: 72
End: 2022-09-26

## 2022-09-26 DIAGNOSIS — R51.9 INTRACTABLE HEADACHE, UNSPECIFIED CHRONICITY PATTERN, UNSPECIFIED HEADACHE TYPE: Primary | ICD-10-CM

## 2022-09-26 RX ORDER — GABAPENTIN 300 MG/1
CAPSULE ORAL
Qty: 90 CAPSULE | Refills: 1 | Status: SHIPPED | OUTPATIENT
Start: 2022-09-26

## 2022-09-26 NOTE — TELEPHONE ENCOUNTER
Appointment Confirmation   Person confirmed appointment with  If not patient, name of the person lm    Date and time of appointment 09/27   Patient acknowledged and will be at appointment? no   Did you advise the patient that they will need a urine sample if they are a new patient? no   Did you advise the patient to bring their current medications for verification? (including any OTC) yes   Additional Information

## 2022-09-26 NOTE — TELEPHONE ENCOUNTER
Pt called in today @10:05am in regards to this  Pt states he's out and has been using his wife's and wanted a call once this is handled

## 2022-09-26 NOTE — TELEPHONE ENCOUNTER
Pharmacy faxed over a request for gabapentin cap that's not on patient's med list  No directions or anything further, just request for gabapentin cap   [facility] [phone]

## 2022-09-27 ENCOUNTER — OFFICE VISIT (OUTPATIENT)
Dept: NEPHROLOGY | Facility: CLINIC | Age: 72
End: 2022-09-27
Payer: COMMERCIAL

## 2022-09-27 ENCOUNTER — ANTICOAG VISIT (OUTPATIENT)
Dept: FAMILY MEDICINE CLINIC | Facility: CLINIC | Age: 72
End: 2022-09-27

## 2022-09-27 VITALS
DIASTOLIC BLOOD PRESSURE: 76 MMHG | BODY MASS INDEX: 36.12 KG/M2 | WEIGHT: 258 LBS | HEIGHT: 71 IN | SYSTOLIC BLOOD PRESSURE: 154 MMHG

## 2022-09-27 DIAGNOSIS — E87.1 HYPONATREMIA: ICD-10-CM

## 2022-09-27 DIAGNOSIS — N02.8 IGA NEPHROPATHY DETERMINED BY BIOPSY OF KIDNEY: Primary | ICD-10-CM

## 2022-09-27 DIAGNOSIS — E83.39 HYPERPHOSPHATEMIA: ICD-10-CM

## 2022-09-27 DIAGNOSIS — N05.9 GLOMERULONEPHRITIS: ICD-10-CM

## 2022-09-27 DIAGNOSIS — N18.4 ANEMIA DUE TO STAGE 4 CHRONIC KIDNEY DISEASE (HCC): ICD-10-CM

## 2022-09-27 DIAGNOSIS — R80.8 OTHER PROTEINURIA: ICD-10-CM

## 2022-09-27 DIAGNOSIS — D63.1 ANEMIA DUE TO STAGE 4 CHRONIC KIDNEY DISEASE (HCC): ICD-10-CM

## 2022-09-27 DIAGNOSIS — I50.32 CHRONIC DIASTOLIC (CONGESTIVE) HEART FAILURE (HCC): ICD-10-CM

## 2022-09-27 DIAGNOSIS — R26.2 AMBULATORY DYSFUNCTION: ICD-10-CM

## 2022-09-27 DIAGNOSIS — E83.52 HYPERCALCEMIA: ICD-10-CM

## 2022-09-27 PROCEDURE — 1160F RVW MEDS BY RX/DR IN RCRD: CPT | Performed by: INTERNAL MEDICINE

## 2022-09-27 PROCEDURE — 3077F SYST BP >= 140 MM HG: CPT | Performed by: INTERNAL MEDICINE

## 2022-09-27 PROCEDURE — 99214 OFFICE O/P EST MOD 30 MIN: CPT | Performed by: INTERNAL MEDICINE

## 2022-09-27 PROCEDURE — 3078F DIAST BP <80 MM HG: CPT | Performed by: INTERNAL MEDICINE

## 2022-09-27 RX ORDER — PANTOPRAZOLE SODIUM 40 MG/1
40 TABLET, DELAYED RELEASE ORAL DAILY
Qty: 180 TABLET | Refills: 3
Start: 2022-09-27 | End: 2022-12-26

## 2022-09-27 NOTE — PROGRESS NOTES
NEPHROLOGY OUTPATIENT PROGRESS NOTE   Eli Huffman 71 y o  male MRN: [id number]  DATE: 9/27/2022  Reason for visit:   Chief Complaint   Patient presents with    Chronic Kidney Disease    Follow-up        Patient Instructions   1  JANEL d/t IgAN, biopsy proven, in setting of recent MRSA infection  -renal biopsy performed May 26, 2022 was limited as only 6 intact glomeruli were present for evaluation on light microscopy  IgA dominant immune complex deposition noted by immunofluorescence  Differential includes IgA nephropathy both primary and secondary forms versus infectious associated glomerulonephritis  Staph aureus infections have been associated with IgA dominant immune complex deposition  Patient did have recent MRSA surgical site infection so infection associated GN should be considered   -prednisone 60 mg daily begun June 2nd  S/p prednisone taper, completed 9/1/22  -monitor weekly bloodwork and urine testing  -if this was a primary IgA nephropathy, it would be compatible with [place] classification of M0 E1 S0 T1-C1   -of 38 glomeruli, 6 were intact, forehead global glomerulosclerosis, segmental sclerosis was absent  Moderate interstitial fibrosis and tubular atrophy as well as arterial intimal fibrosis and mild arterolar hyalinosis were noted  -did not require dialysis inpatient, permacath removed prior to discharge  -sCr now 3 06 as of 9/26/22, slowly improving from 5 34  Do suspect anemia playing a role in slowing renal recovery  -BUN improving  -as the patient has had an acute onset of rapidly progressive glomerulonephritis with normal complement levels and deposition of mesangial IgA, 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  -DEXA scan shows normal bone density  -Off prednisone since 9/1/22  -off bactrim  -may need to consider rituxan infusion in light of podocytopathy if renal function does not continue to improve     2   Hyponatremia, hypercalcemia - resolved     3  Hyperphosphatemia - phos 4 4 as of 9/12/22 and stable, monitor phos monthly  Will hold off on restarting Renagel 800 mg 3 times daily with meals for now  Likely d/t decreased excretion in setting of JANEL  4  Proteinuria - UpCr in setting of IgAN shows UpCr improved from 18 8g to 1 5g as of 8/15/22, remains around 1-1g, did have UpCr as low as 692mg as of 9/19/22  -consider ACEi initiation if UpCr remains above 1g      5  HTN - BP elevated in office  Continue metoprolol 100 mg daily, Cardura 1 mg at bedtime, amlodipine 5 mg daily, flomax, torsemide 20mg daily     6  Anemia ? D/t underlying GN/CKD - Hgb 7 2, received blood transfusion, monitor CBC, continue scheduled epogen infusions, goal Hgb 9-10    -Will increase epogen to 38693g every 2 weeks  -monitor CBC weekly, check iron panel 10/3/22     7  LE edema likely d/t nephrotic syndrome - continue torsemide 20mg daily, will monitor K/SCr weekly  Monitor daily weight/BP readings  Will continue weekly CMP, CBC, Urinalysis and urine protein:Cr  Continue torsemide 20mg daily for edema/nephrotic syndrome  RTC in 1 month  Wesly Cortes was seen today for chronic kidney disease and follow-up  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  -     Comprehensive metabolic panel; Standing  -     CBC and differential; Standing  -     Urinalysis with microscopic; Standing  -     Protein / creatinine ratio, urine; Standing  -     Comprehensive metabolic panel  -     CBC and differential  -     Urinalysis with microscopic  -     Protein / creatinine ratio, urine  -     Iron Panel (Includes Ferritin, Iron Sat%, Iron, and TIBC);  Future    Chronic diastolic (congestive) heart failure (HCC)    Anemia due to stage 4 chronic kidney disease (HCC)  -     CBC and differential; Standing  -     CBC and differential    Hypercalcemia    Hyperphosphatemia    Hyponatremia    Other proteinuria  -     Protein / creatinine ratio, urine; Standing  -     Protein / creatinine ratio, urine    Glomerulonephritis  -     pantoprazole (PROTONIX) 40 mg tablet; Take 1 tablet (40 mg total) by mouth daily    Ambulatory dysfunction  -     pantoprazole (PROTONIX) 40 mg tablet; Take 1 tablet (40 mg total) by mouth daily    Other orders  -     epoetin asha (EPOGEN,PROCRIT) injection 15,000 Units        Assessment/Plan:  1  JANEL d/t IgAN, biopsy proven, in setting of recent MRSA infection  -renal biopsy performed May 26, 2022 was limited as only 6 intact glomeruli were present for evaluation on light microscopy   IgA dominant immune complex deposition noted by immunofluorescence   Differential includes IgA nephropathy both primary and secondary forms versus infectious associated glomerulonephritis   Staph aureus infections have been associated with IgA dominant immune complex deposition   Patient did have recent MRSA surgical site infection so infection associated GN should be considered   -prednisone 60 mg daily begun June 2nd  S/p prednisone taper, completed 9/1/22  -monitor weekly bloodwork and urine testing  -if this was a primary IgA nephropathy, it would be compatible with [place] classification of M0 E1 S0 T1-C1   -of 38 glomeruli, 6 were intact, forehead global glomerulosclerosis, segmental sclerosis was absent   Moderate interstitial fibrosis and tubular atrophy as well as arterial intimal fibrosis and mild arterolar hyalinosis were noted  -did not require dialysis inpatient, permacath removed prior to discharge  -sCr now 3 06 as of 9/26/22, slowly improving from 5 34  Do suspect anemia playing a role in slowing renal recovery    -BUN improving  -as the patient has had an acute onset of rapidly progressive glomerulonephritis with normal complement levels and deposition of mesangial IgA, 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  -DEXA scan shows normal bone density  -Off prednisone since 9/1/22  -off bactrim  -may need to consider rituxan infusion in light of podocytopathy if renal function does not continue to improve     2  Hyponatremia, hypercalcemia - resolved     3  Hyperphosphatemia - phos 4 4 as of 9/12/22 and stable, monitor phos monthly   Will hold off on restarting Renagel 800 mg 3 times daily with meals for now  Alia Mayo d/t decreased excretion in setting of JANEL        4  Proteinuria - UpCr in setting of IgAN shows UpCr improved from 18 8g to 1 5g as of 8/15/22, remains around 1-1g, did have UpCr as low as 692mg as of 9/19/22  -consider ACEi initiation if UpCr remains above 1g      5  HTN - BP elevated in office  Continue metoprolol 100 mg daily, Cardura 1 mg at bedtime, amlodipine 5 mg daily, flomax, torsemide 20mg daily     6  Anemia ? D/t underlying GN/CKD - Hgb 7 2, received blood transfusion, monitor CBC, continue scheduled epogen infusions, goal Hgb 9-10    -Will increase epogen to 69919w every 2 weeks  -monitor CBC weekly, check iron panel 10/3/22     7  LE edema likely d/t nephrotic syndrome - continue torsemide 20mg daily, will monitor K/SCr weekly  Monitor daily weight/BP readings      Will continue weekly CMP, CBC, Urinalysis and urine protein:Cr  Continue torsemide 20mg daily for edema/nephrotic syndrome  RTC in 1 month  SUBJECTIVE / INTERVAL HISTORY:  71 y o  male presents in follow up of IgAN/CKD  Dietermathew Dubois denies any recent illness/hospitalizations/medication changes since last office visit  Denies NSAID use  Weight has gone up but he thinks he is eating more  He thinks his legs are also hard  BP at home 620F systolic  Does have urinary urgency  Review of Systems   Constitutional: Negative for chills and fever  HENT: Negative for sore throat  Eyes: Negative for visual disturbance  Respiratory: Negative for cough and shortness of breath  Cardiovascular: Positive for leg swelling (b/l LE)  Negative for chest pain     Gastrointestinal: Negative for abdominal pain, constipation, diarrhea, nausea and vomiting  Endocrine: Negative for polyuria  Genitourinary: Positive for urgency  Negative for decreased urine volume, difficulty urinating, dysuria and hematuria  Musculoskeletal: Negative for back pain and myalgias         "hurts all over in shoulders and knees and back and neck"   Skin: Negative for rash  Neurological: Negative for dizziness, light-headedness and numbness  Psychiatric/Behavioral: Negative for confusion  OBJECTIVE:  /76 (BP Location: Left arm, Patient Position: Sitting, Cuff Size: Large)   Ht 5' 11" (1 803 m)   Wt 117 kg (258 lb)   BMI 35 98 kg/m²  Body mass index is 35 98 kg/m²  Physical exam:  Physical Exam  Vitals reviewed  Constitutional:       General: He is not in acute distress  Appearance: Normal appearance  He is well-developed  He is obese  He is not diaphoretic  HENT:      Head: Normocephalic and atraumatic  Nose: Nose normal       Mouth/Throat:      Mouth: Mucous membranes are moist       Pharynx: No oropharyngeal exudate  Eyes:      General: No scleral icterus  Right eye: No discharge  Left eye: No discharge  Neck:      Thyroid: No thyromegaly  Cardiovascular:      Rate and Rhythm: Normal rate and regular rhythm  Heart sounds: Normal heart sounds  Comments: B/l LE edema  Pulmonary:      Effort: Pulmonary effort is normal       Breath sounds: Normal breath sounds  No wheezing or rales  Abdominal:      General: Bowel sounds are normal  There is no distension  Palpations: Abdomen is soft  Tenderness: There is no abdominal tenderness  Musculoskeletal:         General: Swelling (b/l LE edema) present  Normal range of motion  Cervical back: Neck supple  Lymphadenopathy:      Cervical: No cervical adenopathy  Skin:     General: Skin is warm and dry  Findings: No rash  Neurological:      General: No focal deficit present        Mental Status: He is alert        Comments: awake   Psychiatric:         Mood and Affect: Mood normal          Behavior: Behavior normal          Medications:    Current Outpatient Medications:     acetaminophen (TYLENOL) 325 mg tablet, Take 3 tablets (975 mg total) by mouth 3 (three) times a day as needed for mild pain, Disp: , Rfl: 0    amLODIPine (NORVASC) 5 mg tablet, Take 1 tablet (5 mg total) by mouth daily, Disp: 90 tablet, Rfl: 3    calcium carbonate-vitamin D (OSCAL-D) 500 mg-200 units per tablet, Take 1 tablet by mouth daily with breakfast, Disp: 30 tablet, Rfl: 0    doxazosin (CARDURA) 1 mg tablet, Take 1 tablet (1 mg total) by mouth daily at bedtime, Disp: 90 tablet, Rfl: 1    DULoxetine (CYMBALTA) 60 mg delayed release capsule, Take 1 capsule (60 mg total) by mouth daily, Disp: 90 capsule, Rfl: 1    flecainide (TAMBOCOR) 50 mg tablet, Take 1 tablet (50 mg total) by mouth 2 (two) times a day, Disp: 180 tablet, Rfl: 3    gabapentin (Neurontin) 300 mg capsule, Take 1 capsule (300 mg total) by mouth daily at bedtime, Disp: 90 capsule, Rfl: 1    methocarbamol (ROBAXIN) 750 mg tablet, Take 1 tablet (750 mg total) by mouth every 6 (six) hours as needed for muscle spasms, Disp: 30 tablet, Rfl: 0    metoprolol succinate (TOPROL-XL) 100 mg 24 hr tablet, TAKE 1 TABLET BY MOUTH  DAILY, Disp: 90 tablet, Rfl: 0    ondansetron (ZOFRAN) 4 mg tablet, Take 1 tablet (4 mg total) by mouth every 8 (eight) hours as needed for nausea or vomiting, Disp: 30 tablet, Rfl: 0    oxyCODONE (ROXICODONE) 5 immediate release tablet, Take 1 tablet (5 mg total) by mouth every 6 (six) hours as needed for moderate pain Max Daily Amount: 20 mg, Disp: 10 tablet, Rfl: 0    pantoprazole (PROTONIX) 40 mg tablet, Take 1 tablet (40 mg total) by mouth daily, Disp: 180 tablet, Rfl: 3    pravastatin (PRAVACHOL) 40 mg tablet, TAKE 1 TABLET BY MOUTH  DAILY, Disp: 90 tablet, Rfl: 1    tamsulosin (FLOMAX) 0 4 mg, Take 1 capsule (0 4 mg total) by mouth daily with dinner, Disp: 90 capsule, Rfl: 1    torsemide (DEMADEX) 20 mg tablet, Take 1 tablet (20 mg total) by mouth daily, Disp: 90 tablet, Rfl: 1    warfarin (COUMADIN) 5 mg tablet, Take 2 5 mg (1/2 tablets) daily except Wednesday and Saturday take 5 mg daily  , Disp: 90 tablet, Rfl: 1    zolpidem (AMBIEN) 10 mg tablet, Take 1 tablet (10 mg total) by mouth daily at bedtime as needed for sleep, Disp: 90 tablet, Rfl: 3    Accu-Chek Softclix Lancets lancets, Use as instructed daily to test sugar E11 9 (Patient not taking: No sig reported), Disp: 200 each, Rfl: 2    Blood Glucose Monitoring Suppl (Accu-Chek Guide) w/Device KIT, , Disp: , Rfl:     glucose blood (Accu-Chek Guide) test strip, USE TO TEST AS DIRECTED (Patient not taking: No sig reported), Disp: 50 strip, Rfl: 2    predniSONE 20 mg tablet, 60mg daily for 4 weeks, then 40mg daily x 2 weeks starting June 30th, 2022  Then 30mg daily x 2 weeks, 20mg daily x 2 weeks, 10mg daily x 2 weeks, 5mg daily (Patient not taking: No sig reported), Disp: 270 tablet, Rfl: 0    Allergies:   Allergies as of 09/27/2022 - Reviewed 09/27/2022   Allergen Reaction Noted    Morphine GI Intolerance 09/11/2018    Vitamin k Other (See Comments) 12/28/2020       The following portions of the patient's history were reviewed and updated as appropriate: past family history, past surgical history and problem list     Laboratory Results:  Lab Results   Component Value Date    SODIUM 138 09/26/2022    K 4 3 09/26/2022     09/26/2022    CO2 21 09/26/2022    BUN 48 (H) 09/26/2022    CREATININE 3 06 (H) 09/26/2022    GLUC 101 (H) 09/26/2022    CALCIUM 8 2 (L) 08/16/2022        Lab Results   Component Value Date    CALCIUM 8 2 (L) 08/16/2022    PHOS 5 5 (H) 06/03/2022       Portions of the record may have been created with voice recognition software   Occasional wrong word or "sound a like" substitutions may have occurred due to the inherent limitations of voice recognition software   Read the chart carefully and recognize, using context, where substitutions have occurred

## 2022-09-27 NOTE — PATIENT INSTRUCTIONS
1  JANEL d/t IgAN, biopsy proven, in setting of recent MRSA infection  -renal biopsy performed May 26, 2022 was limited as only 6 intact glomeruli were present for evaluation on light microscopy  IgA dominant immune complex deposition noted by immunofluorescence  Differential includes IgA nephropathy both primary and secondary forms versus infectious associated glomerulonephritis  Staph aureus infections have been associated with IgA dominant immune complex deposition  Patient did have recent MRSA surgical site infection so infection associated GN should be considered   -prednisone 60 mg daily begun June 2nd  S/p prednisone taper, completed 9/1/22  -monitor weekly bloodwork and urine testing  -if this was a primary IgA nephropathy, it would be compatible with [place] classification of M0 E1 S0 T1-C1   -of 38 glomeruli, 6 were intact, forehead global glomerulosclerosis, segmental sclerosis was absent  Moderate interstitial fibrosis and tubular atrophy as well as arterial intimal fibrosis and mild arterolar hyalinosis were noted  -did not require dialysis inpatient, permacath removed prior to discharge  -sCr now 3 06 as of 9/26/22, slowly improving from 5 34  Do suspect anemia playing a role in slowing renal recovery  -BUN improving  -as the patient has had an acute onset of rapidly progressive glomerulonephritis with normal complement levels and deposition of mesangial IgA, 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  -DEXA scan shows normal bone density  -Off prednisone since 9/1/22  -off bactrim  -may need to consider rituxan infusion in light of podocytopathy if renal function does not continue to improve     2  Hyponatremia, hypercalcemia - resolved     3  Hyperphosphatemia - phos 4 4 as of 9/12/22 and stable, monitor phos monthly  Will hold off on restarting Renagel 800 mg 3 times daily with meals for now  Likely d/t decreased excretion in setting of JANEL         4  Proteinuria - UpCr in setting of IgAN shows UpCr improved from 18 8g to 1 5g as of 8/15/22, remains around 1-1g, did have UpCr as low as 692mg as of 9/19/22  -consider ACEi initiation if UpCr remains above 1g      5  HTN - BP elevated in office  Continue metoprolol 100 mg daily, Cardura 1 mg at bedtime, amlodipine 5 mg daily, flomax, torsemide 20mg daily     6  Anemia ? D/t underlying GN/CKD - Hgb 7 2, received blood transfusion, monitor CBC, continue scheduled epogen infusions, goal Hgb 9-10    -Will increase epogen to 09082d every 2 weeks  -monitor CBC weekly, check iron panel 10/3/22     7  LE edema likely d/t nephrotic syndrome - continue torsemide 20mg daily, will monitor K/SCr weekly  Monitor daily weight/BP readings  Will continue weekly CMP, CBC, Urinalysis and urine protein:Cr  Continue torsemide 20mg daily for edema/nephrotic syndrome  RTC in 1 month

## 2022-09-28 ENCOUNTER — HOSPITAL ENCOUNTER (OUTPATIENT)
Dept: INFUSION CENTER | Facility: HOSPITAL | Age: 72
Discharge: HOME | End: 2022-09-28
Attending: INTERNAL MEDICINE
Payer: COMMERCIAL

## 2022-09-28 ENCOUNTER — TELEPHONE (OUTPATIENT)
Dept: NEPHROLOGY | Facility: CLINIC | Age: 72
End: 2022-09-28

## 2022-09-28 VITALS
OXYGEN SATURATION: 98 % | SYSTOLIC BLOOD PRESSURE: 136 MMHG | RESPIRATION RATE: 14 BRPM | DIASTOLIC BLOOD PRESSURE: 66 MMHG | WEIGHT: 259.48 LBS | TEMPERATURE: 96.8 F | BODY MASS INDEX: 36.19 KG/M2

## 2022-09-28 DIAGNOSIS — N05.9 GLOMERULONEPHRITIS: Primary | ICD-10-CM

## 2022-09-28 DIAGNOSIS — N18.4 ANEMIA DUE TO STAGE 4 CHRONIC KIDNEY DISEASE (HCC): ICD-10-CM

## 2022-09-28 DIAGNOSIS — D63.1 ANEMIA DUE TO STAGE 4 CHRONIC KIDNEY DISEASE (HCC): ICD-10-CM

## 2022-09-28 PROCEDURE — 96372 THER/PROPH/DIAG INJ SC/IM: CPT

## 2022-09-28 RX ADMIN — ERYTHROPOIETIN 15000 UNITS: 20000 INJECTION, SOLUTION INTRAVENOUS; SUBCUTANEOUS at 12:44

## 2022-09-28 NOTE — TELEPHONE ENCOUNTER
rec'd phone call from infusion center that patient was there currently and orders were changed yesterday for a dosage increase but they were also changed from subQ to intravenous  Message sent to Amy and Dr Noel Yeung - confirmed that orders should still be subQ  Orders will be changed in the system

## 2022-09-28 NOTE — ADDENDUM NOTE
Encounter addended by: Blessing Segovia RN on: 9/28/2022 1:01 PM   Actions taken: Clinical Note Signed

## 2022-09-28 NOTE — PROGRESS NOTES
Pt arrived amb with walker for Epogen injection  Dose had been increased late yesterda  Pharmacy did not have exact dose, used   75ml of 20,000unit dose/ml SQ  Original order was for intravenous  Called Dr Wayne Mai office, order changed but had already been released  OK to give SQ per Dr Terry Keller  Given SQ LEA, dsd applied  Pt jaylon well  Disch amb with walker, steady gait

## 2022-09-29 ENCOUNTER — OFFICE VISIT (OUTPATIENT)
Dept: PHYSICAL THERAPY | Facility: CLINIC | Age: 72
End: 2022-09-29
Payer: COMMERCIAL

## 2022-09-29 DIAGNOSIS — G89.29 CHRONIC MIDLINE LOW BACK PAIN WITHOUT SCIATICA: Primary | ICD-10-CM

## 2022-09-29 DIAGNOSIS — M54.50 CHRONIC MIDLINE LOW BACK PAIN WITHOUT SCIATICA: Primary | ICD-10-CM

## 2022-09-29 PROCEDURE — 97112 NEUROMUSCULAR REEDUCATION: CPT | Performed by: PHYSICAL THERAPIST

## 2022-09-29 PROCEDURE — 97110 THERAPEUTIC EXERCISES: CPT | Performed by: PHYSICAL THERAPIST

## 2022-09-29 NOTE — PROGRESS NOTES
Daily Note     Today's date: 2022  Patient name: Kalyn Lemos  : 1950  MRN: [id number]  Referring provider: Jeanie Cobos PT  Dx:   Encounter Diagnosis     ICD-10-CM    1  Chronic midline low back pain without sciatica  M54 50     G89 29                   Subjective: Patient reports good tolerance to his LV  He reports he is still trying to find a good balance with activity but overall he is feeling better  Objective: See treatment diary below      Assessment:   Stage: chronic   Stability of Symptoms:  At Evaluation: stable - unchanged  Global Rating of Change:   Symptom Irritability Level: moderate  Primary Movement Diagnosis: lumbar hypomobility   Patient Specific Functional Scale:   22: walk in the house without assistance 0/10, perform all transitional movements without increased pain 0/10, perform outdoor activities with minimal discomfort 0/10; Total 0/30  FOTO Prediction: 51 by visit 11   FOTO Progress: 40 at evaluation   Greatest Concern: declining function  Current Activity Plan: added to HEP ()  Current Educational Needs: progressions    Patient continues to tolerate manual treatment well - noted decreased pain post treatment  He had good tolerance for mat-based exercises - increased repetitions without pain - fatigue only  Standing exercises were modified secondary to fatigue today  Skilled PT continues to be required to guide progression with lumbar mobility and strength to allow him to perform sit to stand and outdoor activities with less pain and difficulty  Plan: Continue with POC - monitor tolerance to treatment - progress as able NV        Daily Treatment Diary     DX: chronic LBP  EPOC: 10/26/22  Follow Up with Referring Provider:   Precautions: NA  CO-MORBIDITIES: A-fib, Hx of DVT  HEP ACCESS CODE: [id number]      Treatment Diary        Manual Therapy         Lumbar Mobs - Sidelying  Gr 2/3  8 min Gr 2/3  8 min Gr 2/3  8 min Therapeutic Exercise  HEP         Recumbent Bike  6 min 6 min 6 min                Seated Lumbar Flex 9/14 30"x3        Abdominal Brace 9/14 5"x15 5"x15 5"x15      LFS: Alt LE 9/14 15x ea 20x ea 20x ea      Hip ADD Isometric 9/14 5"x15 5"x15       BKFO 9/14 15x ea        Sidelying Clamshells 9/14 15x ea 15x ea 20x ea      Bridge 9/14 5"x15 5"x15 5"x20      LAQ   4# 20x ea 4# 20x ea                SKTC Stretch  20"x3 ea        DKTC Stretch                    Neuromuscular Reeducation          Foam Sidestepping  6 Feet  3 laps 6 Feet  3 laps 6 Feet  3 laps      LE Cone Taps  2 Cones with UE Support  15x ea 2 Cones with UE Support  15x ea       RB Balance                    TB Counter Balance                    FWD Step Up          LAT Step Up                    Standing Hip Flex   20x ea       Standing Hip ABD   20x ea                                     Therapeutic Activity                              Gait Training                                        Modalities

## 2022-10-04 ENCOUNTER — APPOINTMENT (OUTPATIENT)
Dept: PHYSICAL THERAPY | Facility: CLINIC | Age: 72
End: 2022-10-04

## 2022-10-05 ENCOUNTER — APPOINTMENT (OUTPATIENT)
Dept: RADIOLOGY | Facility: CLINIC | Age: 72
End: 2022-10-05
Payer: COMMERCIAL

## 2022-10-05 DIAGNOSIS — Z48.89 ENCOUNTER FOR POSTOPERATIVE CARE RELATED TO SURGICAL JOINT FUSION: ICD-10-CM

## 2022-10-05 DIAGNOSIS — Z98.1 ENCOUNTER FOR POSTOPERATIVE CARE RELATED TO SURGICAL JOINT FUSION: ICD-10-CM

## 2022-10-05 PROCEDURE — 72050 X-RAY EXAM NECK SPINE 4/5VWS: CPT

## 2022-10-06 ENCOUNTER — OFFICE VISIT (OUTPATIENT)
Dept: NEUROSURGERY | Facility: CLINIC | Age: 72
End: 2022-10-06
Payer: COMMERCIAL

## 2022-10-06 ENCOUNTER — ANTICOAG VISIT (OUTPATIENT)
Dept: FAMILY MEDICINE CLINIC | Facility: CLINIC | Age: 72
End: 2022-10-06

## 2022-10-06 ENCOUNTER — APPOINTMENT (OUTPATIENT)
Dept: PHYSICAL THERAPY | Facility: CLINIC | Age: 72
End: 2022-10-06

## 2022-10-06 VITALS
RESPIRATION RATE: 16 BRPM | TEMPERATURE: 97.5 F | WEIGHT: 259 LBS | DIASTOLIC BLOOD PRESSURE: 78 MMHG | HEART RATE: 51 BPM | HEIGHT: 71 IN | BODY MASS INDEX: 36.26 KG/M2 | SYSTOLIC BLOOD PRESSURE: 142 MMHG

## 2022-10-06 DIAGNOSIS — Z98.1 S/P CERVICAL SPINAL FUSION: Primary | ICD-10-CM

## 2022-10-06 PROCEDURE — 99213 OFFICE O/P EST LOW 20 MIN: CPT | Performed by: NEUROLOGICAL SURGERY

## 2022-10-06 NOTE — PROGRESS NOTES
DISCUSSION SUMMARY  This is a 71 y o  male who is status post a decompression for severe spinal stenosis  This was complicated with an infection  This has since been drained and he is slowly improving  I recommended that he continue the exercises that he learned in physical therapy at home in participate in physical therapy programs if possible  Will plan on seeing him back in the office with repeat imaging studies in 6 weeks time    Return in about 6 weeks (around 11/17/2022)  Diagnosis ICD-10-CM Associated Orders   1  S/P cervical spinal fusion  Z98 1 XR spine cervical complete 6+ vw flex/ext/obl          Chief Complaint   Patient presents with   • Follow-up     3 months F/u for S/p (DKO) Posterior cervical evacuation of postoperative collection and debridement with placement of drains C3-T1 [4/1/2022]; XR C-Spine 10/5/22 SL           HPI he does complain of some generalized weakness and balance difficulties but this is better and safer using a walker  He has muscle aches and spasms especially in his shoulders  He feels he has a constant left-sided neck pain that radiates into his left shoulder he also has neck stiffness with limited range of motion  He tried physical therapy last week which he found not to be helpful  he has not fallen  He does feel that he has bilateral arm weakness and occasionally drops things he has some tremors bilaterally in his hands and occasional headaches  Review of Systems   Constitutional: Positive for activity change  HENT: Positive for hearing loss (wears B/L hearing aids)  Negative for trouble swallowing and voice change  Eyes: Negative  Respiratory: Negative  Cardiovascular: Negative  Gastrointestinal: Negative  Negative for constipation, nausea and vomiting  Endocrine: Negative  Genitourinary: Negative  Negative for frequency and urgency     Musculoskeletal: Positive for arthralgias (generalized), gait problem (using rolling walker, off balance), myalgias (spasms and cramps in b/l shoulders), neck pain (Constant left sided neck pain and radiates into left shoulder) and neck stiffness (L>R limited ROM)  Per patient, feels worse since last visit    MEDIATIONS: tylenol -- mild pain relief    PT - last week -- not helpful    No falls       Skin: Negative  Allergic/Immunologic: Negative  Neurological: Positive for tremors (b/l hands R>L), weakness (b/l arms; occasionally drops objects from b/l hands) and headaches (occasionally due to neck pain  )  Negative for numbness  Hematological: Bruises/bleeds easily  Psychiatric/Behavioral: Negative  Negative for sleep disturbance  I reviewed the ROS  Vitals:    /78 (BP Location: Left arm, Patient Position: Sitting, Cuff Size: Standard)   Pulse (!) 51   Temp 97 5 °F (36 4 °C) (Probe)   Resp 16   Ht 5' 11" (1 803 m)   Wt 117 kg (259 lb)   BMI 36 12 kg/m²       MEDICAL HISTORY  Past Medical History:   Diagnosis Date   • Acute venous embolism and thrombosis of deep vessels of proximal lower extremity (HCC)     Last assessed: 5/18/15   • Arthritis    • Cellulitis     LE   • Chronic kidney disease 3/2020    Acute Kidney Injury   • CPAP (continuous positive airway pressure) dependence    • Factor V Leiden ([address] )    • Forgetfulness    • Gross hematuria 5/17/2022   • Headache(784 0) 3/2022    Never till cervical  spine surgery   • Heart murmur 3/2020    Told when in hospital   • [facility] (hard of hearing)    • Hypoalbuminemia 5/11/2022   • Paroxysmal atrial fibrillation ([address] )     Last assessed: 11/2/15   • Sleep apnea      Past Surgical History:   Procedure Laterality Date   • BACK SURGERY      Lower   • COLON SURGERY     • COLONOSCOPY     • INCISION AND DRAINAGE POSTERIOR SPINE N/A 4/1/2022    Procedure: Posterior cervical evacuation of postoperative collection and debridement with placement of drains C3-T1;   Surgeon: Kailey Curry MD;  Location: [facility];  Service: Neurosurgery • IR BIOPSY KIDNEY RANDOM  5/26/2022   • IR TUNNELED DIALYSIS CATHETER PLACEMENT  5/23/2022   • IR TUNNELED DIALYSIS CATHETER REMOVAL  6/2/2022   • TN ARTHRODESIS ANT INTERBODY MIN DISCECTOMY, CERVICAL BELOW C2 N/A 3/11/2022    Procedure: Anterior cervical discectomy and fixation fusion C5/6 and C6/7; Posterior cervical decompression and instrumented fusion C3-T1;   Surgeon: Bina Romero MD;  Location: [facility];  Service: Neurosurgery   • SPINE SURGERY  3/11/2022    Cervical myelopathy/ cervical fusion     Social History     Tobacco Use   • Smoking status: Never Smoker   • Smokeless tobacco: Never Used   Vaping Use   • Vaping Use: Never used   Substance Use Topics   • Alcohol use: Not Currently     Alcohol/week: 0 0 standard drinks   • Drug use: No        Current Outpatient Medications:   •  acetaminophen (TYLENOL) 325 mg tablet, Take 3 tablets (975 mg total) by mouth 3 (three) times a day as needed for mild pain, Disp: , Rfl: 0  •  amLODIPine (NORVASC) 5 mg tablet, Take 1 tablet (5 mg total) by mouth daily, Disp: 90 tablet, Rfl: 3  •  calcium carbonate-vitamin D (OSCAL-D) 500 mg-200 units per tablet, Take 1 tablet by mouth daily with breakfast, Disp: 30 tablet, Rfl: 0  •  doxazosin (CARDURA) 1 mg tablet, Take 1 tablet (1 mg total) by mouth daily at bedtime, Disp: 90 tablet, Rfl: 1  •  DULoxetine (CYMBALTA) 60 mg delayed release capsule, Take 1 capsule (60 mg total) by mouth daily, Disp: 90 capsule, Rfl: 1  •  flecainide (TAMBOCOR) 50 mg tablet, Take 1 tablet (50 mg total) by mouth 2 (two) times a day, Disp: 180 tablet, Rfl: 3  •  gabapentin (Neurontin) 300 mg capsule, Take 1 capsule (300 mg total) by mouth daily at bedtime, Disp: 90 capsule, Rfl: 1  •  methocarbamol (ROBAXIN) 750 mg tablet, Take 1 tablet (750 mg total) by mouth every 6 (six) hours as needed for muscle spasms, Disp: 30 tablet, Rfl: 0  •  metoprolol succinate (TOPROL-XL) 100 mg 24 hr tablet, TAKE 1 TABLET BY MOUTH  DAILY, Disp: 90 tablet, Rfl: 0  •  oxyCODONE (ROXICODONE) 5 immediate release tablet, Take 1 tablet (5 mg total) by mouth every 6 (six) hours as needed for moderate pain Max Daily Amount: 20 mg, Disp: 10 tablet, Rfl: 0  •  pantoprazole (PROTONIX) 40 mg tablet, Take 1 tablet (40 mg total) by mouth daily, Disp: 180 tablet, Rfl: 3  •  pravastatin (PRAVACHOL) 40 mg tablet, TAKE 1 TABLET BY MOUTH  DAILY, Disp: 90 tablet, Rfl: 1  •  tamsulosin (FLOMAX) 0 4 mg, Take 1 capsule (0 4 mg total) by mouth daily with dinner, Disp: 90 capsule, Rfl: 1  •  torsemide (DEMADEX) 20 mg tablet, Take 1 tablet (20 mg total) by mouth daily, Disp: 90 tablet, Rfl: 1  •  warfarin (COUMADIN) 5 mg tablet, Take 2 5 mg (1/2 tablets) daily except Wednesday and Saturday take 5 mg daily  , Disp: 90 tablet, Rfl: 1  •  zolpidem (AMBIEN) 10 mg tablet, Take 1 tablet (10 mg total) by mouth daily at bedtime as needed for sleep, Disp: 90 tablet, Rfl: 3  •  Blood Glucose Monitoring Suppl (Accu-Chek Guide) w/Device KIT, , Disp: , Rfl:   •  glucose blood (Accu-Chek Guide) test strip, USE TO TEST AS DIRECTED (Patient not taking: No sig reported), Disp: 50 strip, Rfl: 2  •  predniSONE 20 mg tablet, 60mg daily for 4 weeks, then 40mg daily x 2 weeks starting June 30th, 2022  Then 30mg daily x 2 weeks, 20mg daily x 2 weeks, 10mg daily x 2 weeks, 5mg daily (Patient not taking: Reported on 10/6/2022), Disp: 270 tablet, Rfl: 0   Allergies   Allergen Reactions   • Morphine GI Intolerance   • Vitamin K Other (See Comments)     On coumadin-patient sensitive to vitamin K amounts in meds etc         The following portions of the patient's history were updated by MA and reviewed by MD: allergies, current medications, past family history, past medical history, past social history, past surgical history and problem list       Physical Exam  Vitals and nursing note reviewed  Constitutional:       General: He is not in acute distress  Appearance: Normal appearance  He is normal weight   He is not ill-appearing, toxic-appearing or diaphoretic  HENT:      Head: Normocephalic and atraumatic  Nose: Nose normal    Eyes:      Extraocular Movements: Extraocular movements intact  Pupils: Pupils are equal, round, and reactive to light  Musculoskeletal:         General: No swelling, tenderness, deformity or signs of injury  Normal range of motion  Cervical back: Normal range of motion and neck supple  Right lower leg: No edema  Left lower leg: No edema  Skin:     General: Skin is warm and dry  Comments: Incision clean and dry and healing well  There is no erythema or edema   Neurological:      Mental Status: He is alert and oriented to person, place, and time  Mental status is at baseline  Cranial Nerves: No cranial nerve deficit  Sensory: Sensory deficit present  Motor: Weakness ( his arms or at 4/5 bilaterally and has difficulty raising his shoulders above the horizon) present  Coordination: Coordination normal       Gait: Gait abnormal ( Slow and deliberate gait uses a walker)  Deep Tendon Reflexes: Abnormal reflex:  patchy nonradicular sensory changes  Psychiatric:         Mood and Affect: Mood normal          Behavior: Behavior normal          Thought Content: Thought content normal          Judgment: Judgment normal            RESULTS/DATA  I have personally reviewed pertinent films in PACS   X-rays of the cervical spine are carefully reviewed    These demonstrate the instrumentation to be in good position without signs of loosening or breakage

## 2022-10-06 NOTE — PROGRESS NOTES
MD Bhupendra Barrera MA  Same              Message left to continue same and recheck in 1-2 weeks  Call office with any questions    LAK

## 2022-10-07 DIAGNOSIS — N05.9 GLOMERULONEPHRITIS: Primary | ICD-10-CM

## 2022-10-07 DIAGNOSIS — N18.4 ANEMIA DUE TO STAGE 4 CHRONIC KIDNEY DISEASE (HCC): ICD-10-CM

## 2022-10-07 DIAGNOSIS — D63.1 ANEMIA DUE TO STAGE 4 CHRONIC KIDNEY DISEASE (HCC): ICD-10-CM

## 2022-10-10 LAB
EXT DIFF-ABS BASOPHILS: 1
EXT DIFF-ABS EOSINOPHILS: 4
EXT DIFF-ABS LYMPHOCYTES: 20
EXT DIFF-ABS MONOCYTES: 8
EXT DIFF-ABS NEUTROPHILS: 67
EXTERNAL HEMATOCRIT: 23 %
EXTERNAL HEMOGLOBIN: 7.7 G/DL
EXTERNAL MCV: 91
EXTERNAL PLATELET COUNT: 211 K/ΜL
EXTERNAL RBC: 2.49
EXTERNAL RDW: 12.8
EXTERNAL WBC: 5.2
MCH RBC BLDCO QN: 30.9 PG (ref 27–34)
MCHC. (HISTORICAL): 34.1

## 2022-10-10 NOTE — TELEPHONE ENCOUNTER
Pt advised   # Urinary tract infection  # Generalized weakness 2/2 UTI  - c/w IV antibiotic  - blood cx negative, urine cx pending     #Weight Loss/ New LBP   - will order XR of T/LS spine   - discuss cancer screenings with daughter     # Parkinson's disease  - not on meds as per home list    # HTN, stable  - c/w home BP meds, amlodipine and ARB    # DM II   - monitor fingerstick glucose, start insulin sliding scale if fsg>180    # DLD   - c/w lipitor    # Gout  - on allopurinol    # BPH  - on flomax, monitor urine output    # DVT prophylaxis - on lovenox subcut  # Code status - Full Code.     Total time spent to complete patient's bedside assessment, review medical chart, discuss medical plan of care with covering medical team was more than 25 minutes  with >50% of time spent face to face with patient, discussion with patient/family and/or coordination of care      Gladis Fong, DO

## 2022-10-11 ENCOUNTER — ANTICOAG VISIT (OUTPATIENT)
Dept: FAMILY MEDICINE CLINIC | Facility: CLINIC | Age: 72
End: 2022-10-11

## 2022-10-11 ENCOUNTER — OFFICE VISIT (OUTPATIENT)
Dept: PHYSICAL THERAPY | Facility: CLINIC | Age: 72
End: 2022-10-11
Payer: COMMERCIAL

## 2022-10-11 ENCOUNTER — TELEPHONE (OUTPATIENT)
Dept: NEPHROLOGY | Facility: CLINIC | Age: 72
End: 2022-10-11

## 2022-10-11 ENCOUNTER — CONSULT (OUTPATIENT)
Dept: PAIN MEDICINE | Facility: CLINIC | Age: 72
End: 2022-10-11
Payer: COMMERCIAL

## 2022-10-11 VITALS
WEIGHT: 256 LBS | TEMPERATURE: 97.8 F | HEART RATE: 53 BPM | SYSTOLIC BLOOD PRESSURE: 122 MMHG | BODY MASS INDEX: 35.84 KG/M2 | HEIGHT: 71 IN | DIASTOLIC BLOOD PRESSURE: 68 MMHG

## 2022-10-11 DIAGNOSIS — M17.11 PRIMARY OSTEOARTHRITIS OF RIGHT KNEE: ICD-10-CM

## 2022-10-11 DIAGNOSIS — G89.29 CHRONIC PAIN OF BOTH KNEES: Primary | ICD-10-CM

## 2022-10-11 DIAGNOSIS — M25.562 CHRONIC PAIN OF BOTH KNEES: Primary | ICD-10-CM

## 2022-10-11 DIAGNOSIS — D68.51 FACTOR V LEIDEN MUTATION (HCC): ICD-10-CM

## 2022-10-11 DIAGNOSIS — M25.561 CHRONIC PAIN OF BOTH KNEES: Primary | ICD-10-CM

## 2022-10-11 DIAGNOSIS — G89.29 CHRONIC MIDLINE LOW BACK PAIN WITHOUT SCIATICA: Primary | ICD-10-CM

## 2022-10-11 DIAGNOSIS — M54.50 CHRONIC MIDLINE LOW BACK PAIN WITHOUT SCIATICA: Primary | ICD-10-CM

## 2022-10-11 DIAGNOSIS — M17.12 PRIMARY OSTEOARTHRITIS OF LEFT KNEE: ICD-10-CM

## 2022-10-11 PROBLEM — Z79.01 ANTICOAGULATED ON WARFARIN: Status: ACTIVE | Noted: 2022-10-11

## 2022-10-11 LAB
INR PPP: 2.4 (ref 0.9–1.2)
PROTHROMBIN TIME: 24.1 SEC (ref 9.1–12)

## 2022-10-11 PROCEDURE — 99204 OFFICE O/P NEW MOD 45 MIN: CPT | Performed by: ANESTHESIOLOGY

## 2022-10-11 PROCEDURE — 97110 THERAPEUTIC EXERCISES: CPT

## 2022-10-11 PROCEDURE — 97112 NEUROMUSCULAR REEDUCATION: CPT

## 2022-10-11 NOTE — TELEPHONE ENCOUNTER
Called and spoke with [employer] to complete their bloodwork prior to their appointment on 10/17/22 with Dr Svetlana Tyler at the [facility] location

## 2022-10-11 NOTE — PROGRESS NOTES
Daily Note     Today's date: 10/11/2022  Patient name: Ty Gurorla  : 1950  MRN: [id number]  Referring provider: Darius Mcclendon PT  Dx:   Encounter Diagnosis     ICD-10-CM    1  Chronic midline low back pain without sciatica  M54 50     G89 29                   Subjective: Patient reports no change in his sxs  States some days are good some are bad  Reports between him and his wife's health they are "in a funk"  Objective: See treatment diary below  Manuals performed by PT Miguelina Faustin  Pt's POC cosigned by PCP  Assessment:   Stage: chronic   Stability of Symptoms:  At Evaluation: stable - unchanged  Global Rating of Change:   Symptom Irritability Level: moderate  Primary Movement Diagnosis: lumbar hypomobility   Patient Specific Functional Scale:   22: walk in the house without assistance 0/10, perform all transitional movements without increased pain 0/10, perform outdoor activities with minimal discomfort 0/10; Total 0/30  FOTO Prediction: 51 by visit 11   FOTO Progress: 40 at evaluation   Greatest Concern: declining function  Current Activity Plan: added to HEP ()  Current Educational Needs: progressions    Pt c/o knee pain and "giving out" when performing standing ex's  Skilled PT continues to be required to guide progression with lumbar mobility and strength to allow him to perform sit to stand and outdoor activities with less pain and difficulty  Pt presents w/ instability in his R LE  Plan: Continue with POC - monitor tolerance to treatment - progress as able NV        Daily Treatment Diary     DX: chronic LBP  EPOC: 10/26/22  Follow Up with Referring Provider:   Precautions: NA  CO-MORBIDITIES: A-fib, Hx of DVT  HEP ACCESS CODE: [id number]      Treatment Diary  9/16 9/20 9/29 10/11     Manual Therapy         Lumbar Mobs - Sidelying  Gr 2/3  8 min Gr 2/3  8 min Gr 2/3  8 min Gr 2/3  8 min NS                                                           Therapeutic Exercise HEP         Recumbent Bike  6 min 6 min 6 min 6 min               Seated Lumbar Flex 9/14 30"x3        Abdominal Brace 9/14 5"x15 5"x15 5"x15 5"x15     LFS: Alt LE 9/14 15x ea 20x ea 20x ea 20x ea     Hip ADD Isometric 9/14 5"x15 5"x15  5"x15     BKFO 9/14 15x ea        Sidelying Clamshells 9/14 15x ea 15x ea 20x ea 20x ea     Bridge 9/14 5"x15 5"x15 5"x20 5" x20     LAQ   4# 20x ea 4# 20x ea 4# 20x ea               SKTC Stretch  20"x3 ea        DKTC Stretch                    Neuromuscular Reeducation          Foam Sidestepping  6 Feet  3 laps 6 Feet  3 laps 6 Feet  3 laps      LE Cone Taps  2 Cones with UE Support  15x ea 2 Cones with UE Support  15x ea       RB Balance                    TB Counter Balance                    FWD Step Up          LAT Step Up                    Standing Hip Flex   20x ea  20x ea     Standing Hip ABD   20x ea  20x ea                                   Therapeutic Activity                              Gait Training                                        Modalities

## 2022-10-11 NOTE — PROGRESS NOTES
Assessment  1  Chronic pain of both knees    2  Factor V Leiden mutation ([address] )    3  Primary osteoarthritis of left knee    4  Primary osteoarthritis of right knee        Plan  Patient presents today to discuss geniculate nerve block and genicular radiofrequency  Unfortunately his insurance company does not cover the radiofrequency  He currently is on opioids, muscle relaxants, nerve membrane stabilizing medications and has tried topical nonsteroidal anti-inflammatories  He would like to try steroid injections will schedule patient for bilateral intra-articular knee injections under fluoroscopic guidance  He understands that if this does not provide long-term relief then no further options at this time  I reviewed the procedure including the risks, infection bleeding etc , patient provided verbal consent  My impressions and treatment recommendations were discussed in detail with the patient who verbalized understanding and had no further questions  Discharge instructions were provided  I personally saw and examined the patient and I agree with the above discussed plan of care  This note is created using dictation transcription  It may contain typographical errors, grammatical errors, improperly dictated words, background noise and other errors  Orders Placed This Encounter   Procedures   • FL spine and pain procedure     Standing Status:   Future     Standing Expiration Date:   10/11/2026     Order Specific Question:   Reason for Exam:     Answer:   bilateral Knee injection     Order Specific Question:   Anticoagulant hold needed? Answer:   no     No orders of the defined types were placed in this encounter  Referred By: Beto Zeng PA-C  History of Present Illness    Alexy Miranda is a 71 y o  male who is referred from Orthopedics for consideration of geniculate nerve blocks  Patient has severe bilateral knee pain    He would like to undergo surgery but did have MRSA infection and was on antibiotics  He is no signs of infection at this time currently rates his pain is 9/10 on the visual analog scale significant interfering with daily living activities  Subsequently uses a walker for ambulation  His pain describes shooting sharp and cutting and lying down and sitting decreases symptoms will standing bending and walking aggravate his pain  Physical therapy and exercise have provided no relief  I have personally reviewed and/or updated the patient's past medical history, past surgical history, family history, social history, current medications, allergies, and vital signs today  Review of Systems   Constitutional: Positive for unexpected weight change  Negative for fever  HENT: Positive for hearing loss  Negative for trouble swallowing  Eyes: Positive for visual disturbance  Respiratory: Negative for shortness of breath and wheezing  Cardiovascular: Positive for leg swelling  Negative for chest pain and palpitations  Gastrointestinal: Negative for constipation, diarrhea, nausea and vomiting  Endocrine: Positive for polyuria  Negative for cold intolerance, heat intolerance and polydipsia  Genitourinary: Positive for hematuria  Negative for difficulty urinating and frequency  Musculoskeletal: Positive for arthralgias, joint swelling and myalgias  Negative for gait problem  Skin: Positive for rash  Neurological: Positive for dizziness  Negative for seizures, syncope, weakness and headaches  Hematological: Does not bruise/bleed easily  Psychiatric/Behavioral: Positive for dysphoric mood  All other systems reviewed and are negative        Patient Active Problem List   Diagnosis   • Status post catheter ablation of atrial flutter   • WILL (obstructive sleep apnea)   • Factor V Leiden mutation [facility])   • Erectile dysfunction   • Insomnia   • Lumbar herniated disc   • Primary osteoarthritis of left knee   • Primary osteoarthritis of right knee   • Paroxysmal A-fib ([facility] )   • Lower extremity edema   • Mixed hyperlipidemia   • History of DVT of lower extremity   • IFG (impaired fasting glucose)   • Pes anserinus bursitis of right knee   • Cervical myelopathy (HCC)   • Sinus bradycardia   • Goals of care, counseling/discussion   • Muscle spasm   • Hyponatremia   • Head pain cephalgia   • Anemia   • Surgical site infection   • Status post cervical spinal fusion   • Great toe pain, right   • Ambulatory dysfunction   • JANEL (acute kidney injury) ([facility] )   • Other proteinuria   • Glomerulonephritis   • Urinary frequency   • Microscopic hematuria   • Chronic diastolic (congestive) heart failure (HCC)   • Hypercalcemia   • Hyperphosphatemia   • 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   • Anticoagulated on warfarin       Past Medical History:   Diagnosis Date   • Acute venous embolism and thrombosis of deep vessels of proximal lower extremity (HCC)     Last assessed: 5/18/15   • Arthritis    • Cellulitis     LE   • Chronic kidney disease 3/2020    Acute Kidney Injury   • CPAP (continuous positive airway pressure) dependence    • Factor V Leiden ([facility] )    • Forgetfulness    • Gross hematuria 5/17/2022   • Headache(784 0) 3/2022    Never till cervical  spine surgery   • Heart murmur 3/2020    Told when in hospital   • [facility] (hard of hearing)    • Hypoalbuminemia 5/11/2022   • Paroxysmal atrial fibrillation ([facility])     Last assessed: 11/2/15   • Sleep apnea        Past Surgical History:   Procedure Laterality Date   • BACK SURGERY      Lower   • COLON SURGERY     • COLONOSCOPY     • INCISION AND DRAINAGE POSTERIOR SPINE N/A 4/1/2022    Procedure: Posterior cervical evacuation of postoperative collection and debridement with placement of drains C3-T1;   Surgeon: Yana Espinoza MD;  [facility];  Service: Neurosurgery   • IR BIOPSY KIDNEY RANDOM  5/26/2022   • IR TUNNELED DIALYSIS CATHETER PLACEMENT  5/23/2022   • IR TUNNELED DIALYSIS CATHETER REMOVAL  6/2/2022   • CO ARTHRODESIS ANT INTERBODY MIN DISCECTOMY, CERVICAL BELOW C2 N/A 3/11/2022    Procedure: Anterior cervical discectomy and fixation fusion C5/6 and C6/7; Posterior cervical decompression and instrumented fusion C3-T1;   Surgeon: Millicent Alfaro MD;  [facility];  Service: Neurosurgery   • SPINE SURGERY  3/11/2022    Cervical myelopathy/ cervical fusion       Family History   Problem Relation Age of Onset   • Lung cancer Mother    • Hearing loss Father    • Heart attack Brother    • Atrial fibrillation Brother    • Emphysema Sister        Social History     Occupational History   • Occupation: Retired   Tobacco Use   • Smoking status: Never Smoker   • Smokeless tobacco: Never Used   Vaping Use   • Vaping Use: Never used   Substance and Sexual Activity   • Alcohol use: Not Currently     Alcohol/week: 0 0 standard drinks   • Drug use: No   • Sexual activity: Not Currently     Partners: Female       Current Outpatient Medications on File Prior to Visit   Medication Sig   • acetaminophen (TYLENOL) 325 mg tablet Take 3 tablets (975 mg total) by mouth 3 (three) times a day as needed for mild pain   • calcium carbonate-vitamin D (OSCAL-D) 500 mg-200 units per tablet Take 1 tablet by mouth daily with breakfast   • DULoxetine (CYMBALTA) 60 mg delayed release capsule Take 1 capsule (60 mg total) by mouth daily   • flecainide (TAMBOCOR) 50 mg tablet Take 1 tablet (50 mg total) by mouth 2 (two) times a day   • gabapentin (Neurontin) 300 mg capsule Take 1 capsule (300 mg total) by mouth daily at bedtime   • methocarbamol (ROBAXIN) 750 mg tablet Take 1 tablet (750 mg total) by mouth every 6 (six) hours as needed for muscle spasms   • metoprolol succinate (TOPROL-XL) 100 mg 24 hr tablet TAKE 1 TABLET BY MOUTH  DAILY   • oxyCODONE (ROXICODONE) 5 immediate release tablet Take 1 tablet (5 mg total) by mouth every 6 (six) hours as needed for moderate pain Max Daily Amount: 20 mg   • pantoprazole (PROTONIX) 40 mg tablet Take 1 tablet (40 mg total) by mouth daily   • pravastatin (PRAVACHOL) 40 mg tablet TAKE 1 TABLET BY MOUTH  DAILY   • tamsulosin (FLOMAX) 0 4 mg Take 1 capsule (0 4 mg total) by mouth daily with dinner   • torsemide (DEMADEX) 20 mg tablet Take 1 tablet (20 mg total) by mouth daily   • warfarin (COUMADIN) 5 mg tablet Take 2 5 mg (1/2 tablets) daily except Wednesday and Saturday take 5 mg daily  • zolpidem (AMBIEN) 10 mg tablet Take 1 tablet (10 mg total) by mouth daily at bedtime as needed for sleep   • amLODIPine (NORVASC) 5 mg tablet Take 1 tablet (5 mg total) by mouth daily   • Blood Glucose Monitoring Suppl (Accu-Chek Guide) w/Device KIT  (Patient not taking: No sig reported)   • doxazosin (CARDURA) 1 mg tablet Take 1 tablet (1 mg total) by mouth daily at bedtime   • glucose blood (Accu-Chek Guide) test strip USE TO TEST AS DIRECTED (Patient not taking: No sig reported)   • predniSONE 20 mg tablet 60mg daily for 4 weeks, then 40mg daily x 2 weeks starting June 30th, 2022  Then 30mg daily x 2 weeks, 20mg daily x 2 weeks, 10mg daily x 2 weeks, 5mg daily (Patient not taking: Reported on 10/6/2022)     No current facility-administered medications on file prior to visit  Allergies   Allergen Reactions   • Morphine GI Intolerance   • Vitamin K Other (See Comments)     On coumadin-patient sensitive to vitamin K amounts in meds etc        Physical Exam    /68 (BP Location: Left arm, Patient Position: Sitting, Cuff Size: Standard)   Pulse (!) 53   Temp 97 8 °F (36 6 °C)   Ht 5' 11" (1 803 m)   Wt 116 kg (256 lb)   BMI 35 70 kg/m²     Constitutional: normal, well developed, well nourished, alert, in no distress and non-toxic and no overt pain behavior   and overweight  Eyes: anicteric  HEENT: grossly intact  Neck: supple, symmetric, trachea midline and no masses   Pulmonary:even and unlabored  Cardiovascular:No edema or pitting edema present  Skin:Normal without rashes or lesions and well hydrated  Psychiatric:Mood and affect appropriate  Neurologic:Cranial Nerves II-XII grossly intact  Musculoskeletal:normal, ambulates with a walker    Imaging  RIGHT KNEE @  6-5-20     INDICATION:   M25 561: Pain in right knee  M25 562: Pain in left knee      COMPARISON:  5/1/2017     VIEWS:  XR KNEE 4+ VW RIGHT INJURY   Images: 4     FINDINGS:     There is no acute fracture or dislocation      There is no joint effusion      Moderate tricompartmental osteoarthritis as evidenced by joint space narrowing, osteophyte formation and subchondral sclerosis  Narrowing is most prominent within the lateral compartment and has progressed, since prior study      No lytic or blastic osseous lesion      Soft tissues are unremarkable      IMPRESSION:     No acute osseous abnormality      Worsening degenerative changes as described  LEFT KNEE @  6-5-20     INDICATION:   M25 561: Pain in right knee  M25 562: Pain in left knee      COMPARISON:  None     VIEWS:  XR KNEE 4+ VW LEFT INJURY         FINDINGS:     There is no acute fracture or dislocation      There is no joint effusion      Moderate tricompartmental osteoarthritis as evidenced by joint space narrowing, osteophyte formation and subchondral sclerosis      No lytic or blastic osseous lesion      Soft tissues are unremarkable      IMPRESSION:     Moderate tricompartmental degenerative changes  Interval worsening  I have personally reviewed pertinent films in PACS and my interpretation is Moderate bilateral osteoarthritis

## 2022-10-12 ENCOUNTER — HOSPITAL ENCOUNTER (OUTPATIENT)
Dept: INFUSION CENTER | Facility: HOSPITAL | Age: 72
Discharge: HOME | End: 2022-10-12
Attending: INTERNAL MEDICINE
Payer: COMMERCIAL

## 2022-10-12 VITALS
HEART RATE: 61 BPM | DIASTOLIC BLOOD PRESSURE: 69 MMHG | SYSTOLIC BLOOD PRESSURE: 155 MMHG | RESPIRATION RATE: 16 BRPM | OXYGEN SATURATION: 99 % | TEMPERATURE: 97.2 F

## 2022-10-12 DIAGNOSIS — N18.4 ANEMIA DUE TO STAGE 4 CHRONIC KIDNEY DISEASE (HCC): ICD-10-CM

## 2022-10-12 DIAGNOSIS — D63.1 ANEMIA DUE TO STAGE 4 CHRONIC KIDNEY DISEASE (HCC): ICD-10-CM

## 2022-10-12 DIAGNOSIS — N05.9 GLOMERULONEPHRITIS: Primary | ICD-10-CM

## 2022-10-12 PROCEDURE — 96372 THER/PROPH/DIAG INJ SC/IM: CPT

## 2022-10-12 RX ADMIN — ERYTHROPOIETIN 15000 UNITS: 20000 INJECTION, SOLUTION INTRAVENOUS; SUBCUTANEOUS at 12:10

## 2022-10-12 NOTE — PROGRESS NOTES
Pt arrived amb with walker for Epogen injection  Hgb = 7 7  Given SQ LEA, jaylon well  Dsd applied  Instructions reviewed  Disch amb with walker to home, steady gait  Has more appts

## 2022-10-13 ENCOUNTER — OFFICE VISIT (OUTPATIENT)
Dept: PHYSICAL THERAPY | Facility: CLINIC | Age: 72
End: 2022-10-13
Payer: COMMERCIAL

## 2022-10-13 ENCOUNTER — HOSPITAL ENCOUNTER (OUTPATIENT)
Dept: RADIOLOGY | Facility: CLINIC | Age: 72
Discharge: HOME | End: 2022-10-13
Payer: COMMERCIAL

## 2022-10-13 VITALS
RESPIRATION RATE: 20 BRPM | SYSTOLIC BLOOD PRESSURE: 109 MMHG | OXYGEN SATURATION: 96 % | HEART RATE: 58 BPM | DIASTOLIC BLOOD PRESSURE: 61 MMHG | TEMPERATURE: 96.8 F

## 2022-10-13 DIAGNOSIS — G89.29 CHRONIC PAIN OF BOTH KNEES: ICD-10-CM

## 2022-10-13 DIAGNOSIS — M54.50 CHRONIC MIDLINE LOW BACK PAIN WITHOUT SCIATICA: Primary | ICD-10-CM

## 2022-10-13 DIAGNOSIS — M25.562 CHRONIC PAIN OF BOTH KNEES: ICD-10-CM

## 2022-10-13 DIAGNOSIS — G89.29 CHRONIC MIDLINE LOW BACK PAIN WITHOUT SCIATICA: Primary | ICD-10-CM

## 2022-10-13 DIAGNOSIS — M25.561 CHRONIC PAIN OF BOTH KNEES: ICD-10-CM

## 2022-10-13 PROCEDURE — 20610 DRAIN/INJ JOINT/BURSA W/O US: CPT | Performed by: ANESTHESIOLOGY

## 2022-10-13 PROCEDURE — 97140 MANUAL THERAPY 1/> REGIONS: CPT | Performed by: PHYSICAL THERAPIST

## 2022-10-13 PROCEDURE — 97110 THERAPEUTIC EXERCISES: CPT | Performed by: PHYSICAL THERAPIST

## 2022-10-13 PROCEDURE — 77002 NEEDLE LOCALIZATION BY XRAY: CPT

## 2022-10-13 PROCEDURE — 77002 NEEDLE LOCALIZATION BY XRAY: CPT | Performed by: ANESTHESIOLOGY

## 2022-10-13 RX ORDER — METHYLPREDNISOLONE ACETATE 80 MG/ML
80 INJECTION, SUSPENSION INTRA-ARTICULAR; INTRALESIONAL; INTRAMUSCULAR; PARENTERAL; SOFT TISSUE ONCE
Status: COMPLETED | OUTPATIENT
Start: 2022-10-13 | End: 2022-10-13

## 2022-10-13 RX ADMIN — IOHEXOL 2 ML: 300 INJECTION, SOLUTION INTRAVENOUS at 11:36

## 2022-10-13 RX ADMIN — LIDOCAINE HYDROCHLORIDE 4 ML: 20 INJECTION, SOLUTION EPIDURAL; INFILTRATION; INTRACAUDAL at 11:36

## 2022-10-13 RX ADMIN — METHYLPREDNISOLONE ACETATE 80 MG: 80 INJECTION, SUSPENSION INTRA-ARTICULAR; INTRALESIONAL; INTRAMUSCULAR; SOFT TISSUE at 11:36

## 2022-10-13 NOTE — H&P
History of Present Illness: The patient is a 71 y o  male who presents with complaints of knee pain  Past Medical History:   Diagnosis Date   • Acute venous embolism and thrombosis of deep vessels of proximal lower extremity (HCC)     Last assessed: 5/18/15   • Arthritis    • Cellulitis     LE   • Chronic kidney disease 3/2020    Acute Kidney Injury   • CPAP (continuous positive airway pressure) dependence    • Factor V Leiden ([address] )    • Forgetfulness    • Gross hematuria 5/17/2022   • Headache(784 0) 3/2022    Never till cervical  spine surgery   • Heart murmur 3/2020    Told when in hospital   • [facility] (hard of hearing)    • Hypoalbuminemia 5/11/2022   • Paroxysmal atrial fibrillation ([facility] )     Last assessed: 11/2/15   • Sleep apnea        Past Surgical History:   Procedure Laterality Date   • BACK SURGERY      Lower   • COLON SURGERY     • COLONOSCOPY     • INCISION AND DRAINAGE POSTERIOR SPINE N/A 4/1/2022    Procedure: Posterior cervical evacuation of postoperative collection and debridement with placement of drains C3-T1; Surgeon: Elif Carpenter MD;  [facility];  Service: Neurosurgery   • IR BIOPSY KIDNEY RANDOM  5/26/2022   • IR TUNNELED DIALYSIS CATHETER PLACEMENT  5/23/2022   • IR TUNNELED DIALYSIS CATHETER REMOVAL  6/2/2022   • MN ARTHRODESIS ANT INTERBODY MIN DISCECTOMY, CERVICAL BELOW C2 N/A 3/11/2022    Procedure: Anterior cervical discectomy and fixation fusion C5/6 and C6/7; Posterior cervical decompression and instrumented fusion C3-T1;   Surgeon: Elif Carpenter MD;  [facility];  Service: Neurosurgery   • SPINE SURGERY  3/11/2022    Cervical myelopathy/ cervical fusion         Current Outpatient Medications:   •  acetaminophen (TYLENOL) 325 mg tablet, Take 3 tablets (975 mg total) by mouth 3 (three) times a day as needed for mild pain, Disp: , Rfl: 0  •  amLODIPine (NORVASC) 5 mg tablet, Take 1 tablet (5 mg total) by mouth daily, Disp: 90 tablet, Rfl: 3  •  Blood Glucose Monitoring Suppl (Accu-Chek Guide) w/Device KIT, , Disp: , Rfl:   •  calcium carbonate-vitamin D (OSCAL-D) 500 mg-200 units per tablet, Take 1 tablet by mouth daily with breakfast, Disp: 30 tablet, Rfl: 0  •  doxazosin (CARDURA) 1 mg tablet, Take 1 tablet (1 mg total) by mouth daily at bedtime, Disp: 90 tablet, Rfl: 1  •  DULoxetine (CYMBALTA) 60 mg delayed release capsule, Take 1 capsule (60 mg total) by mouth daily, Disp: 90 capsule, Rfl: 1  •  flecainide (TAMBOCOR) 50 mg tablet, Take 1 tablet (50 mg total) by mouth 2 (two) times a day, Disp: 180 tablet, Rfl: 3  •  gabapentin (Neurontin) 300 mg capsule, Take 1 capsule (300 mg total) by mouth daily at bedtime, Disp: 90 capsule, Rfl: 1  •  glucose blood (Accu-Chek Guide) test strip, USE TO TEST AS DIRECTED (Patient not taking: No sig reported), Disp: 50 strip, Rfl: 2  •  methocarbamol (ROBAXIN) 750 mg tablet, Take 1 tablet (750 mg total) by mouth every 6 (six) hours as needed for muscle spasms, Disp: 30 tablet, Rfl: 0  •  metoprolol succinate (TOPROL-XL) 100 mg 24 hr tablet, TAKE 1 TABLET BY MOUTH  DAILY, Disp: 90 tablet, Rfl: 0  •  oxyCODONE (ROXICODONE) 5 immediate release tablet, Take 1 tablet (5 mg total) by mouth every 6 (six) hours as needed for moderate pain Max Daily Amount: 20 mg, Disp: 10 tablet, Rfl: 0  •  pantoprazole (PROTONIX) 40 mg tablet, Take 1 tablet (40 mg total) by mouth daily, Disp: 180 tablet, Rfl: 3  •  pravastatin (PRAVACHOL) 40 mg tablet, TAKE 1 TABLET BY MOUTH  DAILY, Disp: 90 tablet, Rfl: 1  •  predniSONE 20 mg tablet, 60mg daily for 4 weeks, then 40mg daily x 2 weeks starting June 30th, 2022   Then 30mg daily x 2 weeks, 20mg daily x 2 weeks, 10mg daily x 2 weeks, 5mg daily (Patient not taking: Reported on 10/6/2022), Disp: 270 tablet, Rfl: 0  •  tamsulosin (FLOMAX) 0 4 mg, Take 1 capsule (0 4 mg total) by mouth daily with dinner, Disp: 90 capsule, Rfl: 1  •  torsemide (DEMADEX) 20 mg tablet, Take 1 tablet (20 mg total) by mouth daily, Disp: 90 tablet, Rfl: 1  •  warfarin (COUMADIN) 5 mg tablet, Take 2 5 mg (1/2 tablets) daily except Wednesday and Saturday take 5 mg daily  , Disp: 90 tablet, Rfl: 1  •  zolpidem (AMBIEN) 10 mg tablet, Take 1 tablet (10 mg total) by mouth daily at bedtime as needed for sleep, Disp: 90 tablet, Rfl: 3    Current Facility-Administered Medications:   •  iohexol (OMNIPAQUE) 300 mg/mL injection 50 mL, 50 mL, Intra-articular, Once, Nicanor Dao DO  •  lidocaine (PF) (XYLOCAINE-MPF) 2 % injection 5 mL, 5 mL, Intra-articular, Once, Nicanor Dao DO  •  methylPREDNISolone acetate (DEPO-MEDROL) injection 80 mg, 80 mg, Intra-articular, Once, Nicanor Dao DO    Allergies   Allergen Reactions   • Morphine GI Intolerance   • Vitamin K Other (See Comments)     On coumadin-patient sensitive to vitamin K amounts in meds etc        Physical Exam: There were no vitals filed for this visit  General: Awake, Alert, Oriented x 3, Mood and affect appropriate  Respiratory: Respirations even and unlabored  Cardiovascular: Peripheral pulses intact; no edema  Musculoskeletal Exam:  Decreased range of motion bilateral knee, ambulates with a walker    ASA Score: II    Patient/Chart Verification  Patient ID Verified: Verbal  ID Band Applied: No  Consents Confirmed: Procedural, To be obtained in the Pre-Procedure area  Interval H&P(within 24 hr) Complete (required for Outpatients and Surgery Admit only): To be obtained in the Pre-Procedure area  Allergies Reviewed: Yes  Anticoag/NSAID held?: NA  Currently on antibiotics?: No    Assessment:   1   Chronic pain of both knees        Plan: bilateral Knee injection

## 2022-10-13 NOTE — DISCHARGE INSTRUCTIONS
Steroid Joint Injection   WHAT YOU NEED TO KNOW:   A steroid joint injection is a procedure to inject steroid medicine into a joint  Steroid medicine decreases pain and inflammation  The injection may also contain an anesthetic (numbing medicine) to decrease pain  It may be done to treat conditions such as arthritis, gout, or carpal tunnel syndrome  The injections may be given in your knee, ankle, shoulder, elbow, wrist, ankle or sacroiliac joint  Do not apply heat to any area that is numb  If you have discomfort or soreness at the injection site, you may apply ice today, 20 minutes on and 20 minutes off  Tomorrow you may use ice or warm, moist heat  Do not apply ice or heat directly to the skin  You may have an increase or change in the discomfort for 36-48 hours after your treatment  Apply ice and continue with any pain medicine you have been prescribed  Do not do anything strenuous today  You may shower, but no tub baths or hot tubs today  You may resume your normal activities tomorrow, but do not “overdo it”  Resume normal activities slowly when you are feeling better  If you experience redness, drainage or swelling at the injection site, or if you develop a fever above 100 degrees, please call [facility] at [phone] or go to the Emergency Room  Continue to take all routine medicines prescribed by your primary care physician unless otherwise instructed by our staff  Most blood thinners should be started again according to your regularly scheduled dosing  If you have any questions, please give our office a call  As no general anesthesia was used in today's procedure, you should not experience any side effects related to anesthesia  If you are diabetic, the steroids used in today's injection may temporarily increase your blood sugar levels after the first few days after your injection   Please keep a close eye on your sugars and alert the doctor who manages your diabetes if your sugars are significantly high from your baseline or you are symptomatic  If you have a problem specifically related to your procedure, please call our office at [phone]  Problems not related to your procedure should be directed to your primary care physician

## 2022-10-13 NOTE — PROGRESS NOTES
Daily Note     Today's date: 10/13/2022  Patient name: Sunny Hoskins  : 1950  MRN: [id number]  Referring provider: Rylee Hawk PT  Dx:   Encounter Diagnosis     ICD-10-CM    1  Chronic midline low back pain without sciatica  M54 50     G89 29                   Subjective: Patient reports he is achy from having an infusion yesterday but overall he is feeling better  He reports he is feeling confident transitioning to an I HEP at this time  Objective: See treatment diary below  Assessment:   Stage: chronic   Stability of Symptoms:  At Evaluation: stable - unchanged  Global Rating of Change:   Symptom Irritability Level: moderate  Primary Movement Diagnosis: lumbar hypomobility   Patient Specific Functional Scale:   22: walk in the house without assistance 0/10, perform all transitional movements without increased pain 0/10, perform outdoor activities with minimal discomfort 0/10; Total 0/30  FOTO Prediction: 51 by visit 11   FOTO Progress: 40 at evaluation   Greatest Concern: declining function  Current Activity Plan: added to HEP ()  Current Educational Needs: progressions    Patient had good tolerance to manual treatment - noted feeling looser post treatment  He demonstrated good form with exercises  He had no questions with exercises  At this time it is recommended that he continue with an I HEP  He is to contact me if he has return of symptoms or any questions/ concerns  Plan: DC to I HEP         Daily Treatment Diary     DX: chronic LBP  EPOC: 10/26/22  Follow Up with Referring Provider:   Precautions: NA  CO-MORBIDITIES: A-fib, Hx of DVT  HEP ACCESS CODE: [id number]      Treatment Diary  9/20 9/29 10/11 10/13     Manual Therapy         Lumbar Mobs - Sidelying  Gr 2/3  8 min Gr 2/3  8 min Gr 2/3  8 min NS Gr 2/3  8 min                                                            Therapeutic Exercise  HEP         Recumbent Bike  6 min 6 min 6 min 6 min               Seated Lumbar Flex 9/14         Abdominal Brace 9/14 5"x15 5"x15 5"x15 5"x15     LFS: Alt LE 9/14 20x ea 20x ea 20x ea 20x ea     Hip ADD Isometric 9/14 5"x15  5"x15      BKFO 9/14         Sidelying Clamshells 9/14 15x ea 20x ea 20x ea 20x ea     Bridge 9/14 5"x15 5"x20 5" x20 5"x20     LAQ  4# 20x ea 4# 20x ea 4# 20x ea                SKTC Stretch          DKTC Stretch                    Neuromuscular Reeducation          Foam Sidestepping  6 Feet  3 laps 6 Feet  3 laps       LE Cone Taps  2 Cones with UE Support  15x ea        RB Balance                    TB Counter Balance                    FWD Step Up          LAT Step Up                    Standing Hip Flex  20x ea  20x ea      Standing Hip ABD  20x ea  20x ea                                    Therapeutic Activity                              Gait Training                                        Modalities

## 2022-10-14 ENCOUNTER — TELEPHONE (OUTPATIENT)
Dept: NEPHROLOGY | Facility: CLINIC | Age: 72
End: 2022-10-14

## 2022-10-14 NOTE — TELEPHONE ENCOUNTER
Spoke to the patient and he confirm appoint for 10/17/22 also remind to bring in a list of all the medications

## 2022-10-17 ENCOUNTER — OFFICE VISIT (OUTPATIENT)
Dept: NEPHROLOGY | Facility: HOSPITAL | Age: 72
End: 2022-10-17
Payer: COMMERCIAL

## 2022-10-17 VITALS
OXYGEN SATURATION: 98 % | BODY MASS INDEX: 34.72 KG/M2 | HEIGHT: 71 IN | WEIGHT: 248 LBS | SYSTOLIC BLOOD PRESSURE: 142 MMHG | DIASTOLIC BLOOD PRESSURE: 70 MMHG | HEART RATE: 62 BPM

## 2022-10-17 DIAGNOSIS — I50.32 CHRONIC DIASTOLIC (CONGESTIVE) HEART FAILURE (HCC): ICD-10-CM

## 2022-10-17 DIAGNOSIS — N02.8 IGA NEPHROPATHY DETERMINED BY BIOPSY OF KIDNEY: Primary | ICD-10-CM

## 2022-10-17 DIAGNOSIS — E83.39 HYPERPHOSPHATEMIA: ICD-10-CM

## 2022-10-17 DIAGNOSIS — R31.29 MICROSCOPIC HEMATURIA: ICD-10-CM

## 2022-10-17 DIAGNOSIS — R80.8 OTHER PROTEINURIA: ICD-10-CM

## 2022-10-17 PROCEDURE — 99214 OFFICE O/P EST MOD 30 MIN: CPT | Performed by: INTERNAL MEDICINE

## 2022-10-17 NOTE — PATIENT INSTRUCTIONS
1  JANEL d/t IgAN, biopsy proven, in setting of recent MRSA infection  -renal biopsy performed May 26, 2022 was limited as only 6 intact glomeruli were present for evaluation on light microscopy  IgA dominant immune complex deposition noted by immunofluorescence  Differential includes IgA nephropathy both primary and secondary forms versus infectious associated glomerulonephritis  Staph aureus infections have been associated with IgA dominant immune complex deposition  Patient did have recent MRSA surgical site infection so infection associated GN should be considered   -prednisone 60 mg daily begun June 2nd  S/p prednisone taper, completed 9/1/22  -continue to monitor weekly bloodwork and urine testing  -if this was a primary IgA nephropathy, it would be compatible with [place] classification of M0 E1 S0 T1-C1   -of 38 glomeruli, 6 were intact, forehead global glomerulosclerosis, segmental sclerosis was absent  Moderate interstitial fibrosis and tubular atrophy as well as arterial intimal fibrosis and mild arterolar hyalinosis were noted  -did not require dialysis inpatient, permacath removed prior to discharge  -sCr 3 10 as of 10/14/22, slowly improving from 5 34 and seems to have stabilized  Do suspect anemia playing a role in slowing renal recovery  -BUN improving  -as the patient has had an acute onset of rapidly progressive glomerulonephritis with normal complement levels and deposition of mesangial IgA, 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  -DEXA scan shows normal bone density  -Off prednisone since 9/1/22  -off bactrim  -may need to consider rituxan infusion in light of podocytopathy if renal function/proteinuria does not continue to improve     2  Hyponatremia, hypercalcemia - resolved     3  Hyperphosphatemia - phos 4 4 as of 9/12/22 and stable, monitor phos monthly  Will hold off on restarting Renagel 800 mg 3 times daily with meals for now    Likely d/t decreased excretion in setting of JANEL  4  Proteinuria - UpCr in setting of IgAN shows UpCr improved from 18 8g to 1 5g as of 8/15/22, with latest UpCr 853mg/g as of 10/14/22  -consider ACEi initiation if UpCr rises above 1g      5  HTN - BP elevated in office  Continue metoprolol 100 mg daily, Cardura 1 mg at bedtime, amlodipine 5 mg daily, flomax, torsemide 20mg daily     6  Anemia ? D/t underlying GN/CKD - Hgb 7 7, received blood transfusion in the past, monitor CBC, continue scheduled epogen infusions 20374p every 2 weeks, goal Hgb 9-10    -monitor CBC weekly, check iron panel (ordered 9/27/22 but never obtained)     7  LE edema likely d/t nephrotic syndrome - continue torsemide 20mg daily, will monitor K/SCr weekly  Monitor daily weight/BP readings  Will continue weekly CMP, CBC, phosphorus, Urinalysis and urine protein:Cr  Should also obtain iron panel  Continue torsemide 20mg daily for edema/nephrotic syndrome  RTC in 1 month

## 2022-10-17 NOTE — PROGRESS NOTES
NEPHROLOGY OUTPATIENT PROGRESS NOTE   Brunilda Blancas 71 y o  male MRN: [id number]  DATE: 10/17/2022  Reason for visit:   Chief Complaint   Patient presents with   • Follow-up   • Proteinuria        Patient Instructions   1  JANEL d/t IgAN, biopsy proven, in setting of recent MRSA infection  -renal biopsy performed May 26, 2022 was limited as only 6 intact glomeruli were present for evaluation on light microscopy  IgA dominant immune complex deposition noted by immunofluorescence  Differential includes IgA nephropathy both primary and secondary forms versus infectious associated glomerulonephritis  Staph aureus infections have been associated with IgA dominant immune complex deposition  Patient did have recent MRSA surgical site infection so infection associated GN should be considered   -prednisone 60 mg daily begun June 2nd  S/p prednisone taper, completed 9/1/22  -continue to monitor weekly bloodwork and urine testing  -if this was a primary IgA nephropathy, it would be compatible with [place] classification of M0 E1 S0 T1-C1   -of 38 glomeruli, 6 were intact, forehead global glomerulosclerosis, segmental sclerosis was absent  Moderate interstitial fibrosis and tubular atrophy as well as arterial intimal fibrosis and mild arterolar hyalinosis were noted  -did not require dialysis inpatient, permacath removed prior to discharge  -sCr 3 10 as of 10/14/22, slowly improving from 5 34 and seems to have stabilized  Do suspect anemia playing a role in slowing renal recovery  -BUN improving  -as the patient has had an acute onset of rapidly progressive glomerulonephritis with normal complement levels and deposition of mesangial IgA, 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  -DEXA scan shows normal bone density  -Off prednisone since 9/1/22  -off bactrim  -may need to consider rituxan infusion in light of podocytopathy if renal function/proteinuria does not continue to improve     2  Hyponatremia, hypercalcemia - resolved     3  Hyperphosphatemia - phos 4 4 as of 9/12/22 and stable, monitor phos monthly  Will hold off on restarting Renagel 800 mg 3 times daily with meals for now  Likely d/t decreased excretion in setting of JANEL  4  Proteinuria - UpCr in setting of IgAN shows UpCr improved from 18 8g to 1 5g as of 8/15/22, with latest UpCr 853mg/g as of 10/14/22  -consider ACEi initiation if UpCr rises above 1g      5  HTN - BP elevated in office  Continue metoprolol 100 mg daily, Cardura 1 mg at bedtime, amlodipine 5 mg daily, flomax, torsemide 20mg daily     6  Anemia ? D/t underlying GN/CKD - Hgb 7 7, received blood transfusion in the past, monitor CBC, continue scheduled epogen infusions 80023a every 2 weeks, goal Hgb 9-10    -monitor CBC weekly, check iron panel (ordered 9/27/22 but never obtained)     7  LE edema likely d/t nephrotic syndrome - continue torsemide 20mg daily, will monitor K/SCr weekly  Monitor daily weight/BP readings  Will continue weekly CMP, CBC, phosphorus, Urinalysis and urine protein:Cr  Should also obtain iron panel  Continue torsemide 20mg daily for edema/nephrotic syndrome  RTC in 1 month  Lawrence Wisdom was seen today for follow-up and proteinuria  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    Chronic diastolic (congestive) heart failure (HCC)    Microscopic hematuria    Hyperphosphatemia    Other proteinuria        Assessment/Plan:  1  JANEL d/t IgAN, biopsy proven, in setting of recent MRSA infection  -renal biopsy performed May 26, 2022 was limited as only 6 intact glomeruli were present for evaluation on light microscopy   IgA dominant immune complex deposition noted by immunofluorescence   Differential includes IgA nephropathy both primary and secondary forms versus infectious associated glomerulonephritis   Staph aureus infections have been associated with IgA dominant immune complex deposition   Patient did have recent MRSA surgical site infection so infection associated GN should be considered   -prednisone 60 mg daily begun June 2nd  S/p prednisone taper, completed 9/1/22  -continue to monitor weekly bloodwork and urine testing  -if this was a primary IgA nephropathy, it would be compatible with [place] classification of M0 E1 S0 T1-C1   -of 38 glomeruli, 6 were intact, forehead global glomerulosclerosis, segmental sclerosis was absent   Moderate interstitial fibrosis and tubular atrophy as well as arterial intimal fibrosis and mild arterolar hyalinosis were noted  -did not require dialysis inpatient, permacath removed prior to discharge  -sCr 3 10 as of 10/14/22, slowly improving from 5 34 and seems to have stabilized  Do suspect anemia playing a role in slowing renal recovery  -BUN improving  -as the patient has had an acute onset of rapidly progressive glomerulonephritis with normal complement levels and deposition of mesangial IgA, 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  -DEXA scan shows normal bone density  -Off prednisone since 9/1/22  -off bactrim  -may need to consider rituxan infusion in light of podocytopathy if renal function/proteinuria does not continue to improve     2  Hyponatremia, hypercalcemia - resolved     3  Hyperphosphatemia - phos 4 4 as of 9/12/22 and stable, monitor phos monthly   Will hold off on restarting Renagel 800 mg 3 times daily with meals for now  Shena Mason d/t decreased excretion in setting of JANEL        4  Proteinuria - UpCr in setting of IgAN shows UpCr improved from 18 8g to 1 5g as of 8/15/22, with latest UpCr 853mg/g as of 10/14/22  -consider ACEi initiation if UpCr rises above 1g      5  HTN - BP elevated in office  Continue metoprolol 100 mg daily, Cardura 1 mg at bedtime, amlodipine 5 mg daily, flomax, torsemide 20mg daily     6  Anemia ?  D/t underlying GN/CKD - Hgb 7 7, received blood transfusion in the past, monitor CBC, continue scheduled epogen infusions 35391c every 2 weeks, goal Hgb 9-10    -monitor CBC weekly, check iron panel (ordered 9/27/22 but never obtained)     7  LE edema likely d/t nephrotic syndrome - continue torsemide 20mg daily, will monitor K/SCr weekly  Monitor daily weight/BP readings      Will continue weekly CMP, CBC, phosphorus, Urinalysis and urine protein:Cr  Should also obtain iron panel  Continue torsemide 20mg daily for edema/nephrotic syndrome  RTC in 1 month  SUBJECTIVE / INTERVAL HISTORY:  71 y o  male presents in follow up of IgAN Reyes Faraz denies any recent illness/hospitalizations/medication changes since last office visit  Denies NSAID use  Does still have leg edema  HTN -BP at home [id number] systolic    Review of Systems   Constitutional: Positive for chills  Negative for fever  HENT: Negative for sore throat  Eyes: Negative for visual disturbance  Respiratory: Negative for cough and shortness of breath  Cardiovascular: Positive for leg swelling (as per HPI)  Negative for chest pain  Gastrointestinal: Negative for abdominal pain, constipation, diarrhea, nausea and vomiting  Endocrine: Negative for polyuria  Genitourinary: Negative for decreased urine volume, difficulty urinating, dysuria and hematuria  "pees too much" urinary incontinence at times   Musculoskeletal: Positive for arthralgias and back pain  Negative for myalgias         "a lot of aches and pains"   Skin: Negative for rash  Neurological: Negative for dizziness, light-headedness and numbness  Psychiatric/Behavioral: Negative for confusion  OBJECTIVE:  /70 (BP Location: Left arm, Patient Position: Sitting, Cuff Size: Adult)   Pulse 62   Ht 5' 11" (1 803 m)   Wt 112 kg (248 lb)   SpO2 98%   BMI 34 59 kg/m²  Body mass index is 34 59 kg/m²  Physical exam:  Physical Exam  Vitals reviewed  Constitutional:       General: He is not in acute distress       Appearance: Normal appearance  He is well-developed  He is not diaphoretic  HENT:      Head: Normocephalic and atraumatic  Nose: Nose normal       Mouth/Throat:      Mouth: Mucous membranes are moist       Pharynx: No oropharyngeal exudate  Eyes:      General: No scleral icterus  Right eye: No discharge  Left eye: No discharge  Neck:      Thyroid: No thyromegaly  Cardiovascular:      Rate and Rhythm: Normal rate and regular rhythm  Heart sounds: Normal heart sounds  Comments: B/l LE edema  Pulmonary:      Effort: Pulmonary effort is normal       Breath sounds: Normal breath sounds  No wheezing or rales  Abdominal:      General: Bowel sounds are normal  There is no distension  Palpations: Abdomen is soft  Tenderness: There is no abdominal tenderness  Musculoskeletal:         General: Swelling (b/l LE edema) present  Normal range of motion  Cervical back: Neck supple  Lymphadenopathy:      Cervical: No cervical adenopathy  Skin:     General: Skin is warm and dry  Coloration: Skin is not jaundiced  Findings: No rash  Neurological:      General: No focal deficit present  Mental Status: He is alert        Comments: awake   Psychiatric:         Mood and Affect: Mood normal          Behavior: Behavior normal          Medications:    Current Outpatient Medications:   •  acetaminophen (TYLENOL) 325 mg tablet, Take 3 tablets (975 mg total) by mouth 3 (three) times a day as needed for mild pain, Disp: , Rfl: 0  •  calcium carbonate-vitamin D (OSCAL-D) 500 mg-200 units per tablet, Take 1 tablet by mouth daily with breakfast, Disp: 30 tablet, Rfl: 0  •  DULoxetine (CYMBALTA) 60 mg delayed release capsule, Take 1 capsule (60 mg total) by mouth daily, Disp: 90 capsule, Rfl: 1  •  flecainide (TAMBOCOR) 50 mg tablet, Take 1 tablet (50 mg total) by mouth 2 (two) times a day, Disp: 180 tablet, Rfl: 3  •  gabapentin (Neurontin) 300 mg capsule, Take 1 capsule (300 mg total) by mouth daily at bedtime, Disp: 90 capsule, Rfl: 1  •  methocarbamol (ROBAXIN) 750 mg tablet, Take 1 tablet (750 mg total) by mouth every 6 (six) hours as needed for muscle spasms, Disp: 30 tablet, Rfl: 0  •  metoprolol succinate (TOPROL-XL) 100 mg 24 hr tablet, TAKE 1 TABLET BY MOUTH  DAILY, Disp: 90 tablet, Rfl: 0  •  oxyCODONE (ROXICODONE) 5 immediate release tablet, Take 1 tablet (5 mg total) by mouth every 6 (six) hours as needed for moderate pain Max Daily Amount: 20 mg, Disp: 10 tablet, Rfl: 0  •  pantoprazole (PROTONIX) 40 mg tablet, Take 1 tablet (40 mg total) by mouth daily, Disp: 180 tablet, Rfl: 3  •  pravastatin (PRAVACHOL) 40 mg tablet, TAKE 1 TABLET BY MOUTH  DAILY, Disp: 90 tablet, Rfl: 1  •  tamsulosin (FLOMAX) 0 4 mg, Take 1 capsule (0 4 mg total) by mouth daily with dinner, Disp: 90 capsule, Rfl: 1  •  torsemide (DEMADEX) 20 mg tablet, Take 1 tablet (20 mg total) by mouth daily, Disp: 90 tablet, Rfl: 1  •  warfarin (COUMADIN) 5 mg tablet, Take 2 5 mg (1/2 tablets) daily except Wednesday and Saturday take 5 mg daily  , Disp: 90 tablet, Rfl: 1  •  zolpidem (AMBIEN) 10 mg tablet, Take 1 tablet (10 mg total) by mouth daily at bedtime as needed for sleep, Disp: 90 tablet, Rfl: 3  •  amLODIPine (NORVASC) 5 mg tablet, Take 1 tablet (5 mg total) by mouth daily, Disp: 90 tablet, Rfl: 3  •  Blood Glucose Monitoring Suppl (Accu-Chek Guide) w/Device KIT, , Disp: , Rfl:   •  doxazosin (CARDURA) 1 mg tablet, Take 1 tablet (1 mg total) by mouth daily at bedtime, Disp: 90 tablet, Rfl: 1  •  glucose blood (Accu-Chek Guide) test strip, USE TO TEST AS DIRECTED (Patient not taking: No sig reported), Disp: 50 strip, Rfl: 2  •  predniSONE 20 mg tablet, 60mg daily for 4 weeks, then 40mg daily x 2 weeks starting June 30th, 2022  Then 30mg daily x 2 weeks, 20mg daily x 2 weeks, 10mg daily x 2 weeks, 5mg daily (Patient not taking: Reported on 10/6/2022), Disp: 270 tablet, Rfl: 0    Allergies:   Allergies as of 10/17/2022 - Reviewed 10/17/2022   Allergen Reaction Noted   • Morphine GI Intolerance 09/11/2018   • Vitamin k Other (See Comments) 12/28/2020       The following portions of the patient's history were reviewed and updated as appropriate: past family history, past surgical history and problem list     Laboratory Results:  Lab Results   Component Value Date    SODIUM 143 10/14/2022    K 4 2 10/14/2022     10/14/2022    CO2 21 10/14/2022    BUN 58 (H) 10/14/2022    CREATININE 3 10 (H) 10/14/2022    GLUC 127 (H) 10/14/2022    CALCIUM 8 2 (L) 08/16/2022        Lab Results   Component Value Date    CALCIUM 8 2 (L) 08/16/2022    PHOS 5 5 (H) 06/03/2022       Portions of the record may have been created with voice recognition software   Occasional wrong word or "sound a like" substitutions may have occurred due to the inherent limitations of voice recognition software   Read the chart carefully and recognize, using context, where substitutions have occurred

## 2022-10-18 ENCOUNTER — TELEPHONE (OUTPATIENT)
Dept: NEPHROLOGY | Facility: HOSPITAL | Age: 72
End: 2022-10-18

## 2022-10-18 NOTE — TELEPHONE ENCOUNTER
Called patient and spoke with his wife about scheduling the 1 month follow up  Patient is scheduled for 11/28

## 2022-10-20 ENCOUNTER — TELEPHONE (OUTPATIENT)
Dept: PAIN MEDICINE | Facility: CLINIC | Age: 72
End: 2022-10-20

## 2022-10-24 ENCOUNTER — HOSPITAL ENCOUNTER (OUTPATIENT)
Dept: INFUSION CENTER | Facility: HOSPITAL | Age: 72
Discharge: HOME | End: 2022-10-24
Attending: INTERNAL MEDICINE

## 2022-10-26 ENCOUNTER — HOSPITAL ENCOUNTER (OUTPATIENT)
Dept: INFUSION CENTER | Facility: HOSPITAL | Age: 72
Discharge: HOME | End: 2022-10-26
Attending: INTERNAL MEDICINE
Payer: COMMERCIAL

## 2022-10-26 VITALS
RESPIRATION RATE: 16 BRPM | DIASTOLIC BLOOD PRESSURE: 69 MMHG | OXYGEN SATURATION: 97 % | HEART RATE: 59 BPM | SYSTOLIC BLOOD PRESSURE: 151 MMHG | TEMPERATURE: 96.8 F | WEIGHT: 246.91 LBS | BODY MASS INDEX: 34.44 KG/M2

## 2022-10-26 DIAGNOSIS — N05.9 GLOMERULONEPHRITIS: Primary | ICD-10-CM

## 2022-10-26 DIAGNOSIS — N18.4 ANEMIA DUE TO STAGE 4 CHRONIC KIDNEY DISEASE (HCC): ICD-10-CM

## 2022-10-26 DIAGNOSIS — D63.1 ANEMIA DUE TO STAGE 4 CHRONIC KIDNEY DISEASE (HCC): ICD-10-CM

## 2022-10-26 RX ADMIN — ERYTHROPOIETIN 15000 UNITS: 20000 INJECTION, SOLUTION INTRAVENOUS; SUBCUTANEOUS at 12:06

## 2022-10-26 NOTE — PROGRESS NOTES
Pt tolerated Epogen injection in [facility] with no complications  Pt aware of next appt  Left unit ambulatory with steady gait using walker  AVS printed and given to pt

## 2022-10-28 DIAGNOSIS — I50.32 CHRONIC DIASTOLIC (CONGESTIVE) HEART FAILURE (HCC): ICD-10-CM

## 2022-10-28 DIAGNOSIS — G95.9 CERVICAL MYELOPATHY (HCC): ICD-10-CM

## 2022-10-28 RX ORDER — DULOXETIN HYDROCHLORIDE 60 MG/1
60 CAPSULE, DELAYED RELEASE ORAL DAILY
Qty: 90 CAPSULE | Refills: 1 | Status: SHIPPED | OUTPATIENT
Start: 2022-10-28

## 2022-10-28 RX ORDER — DOXAZOSIN MESYLATE 1 MG/1
1 TABLET ORAL
Qty: 90 TABLET | Refills: 1 | Status: SHIPPED | OUTPATIENT
Start: 2022-10-28 | End: 2022-11-27

## 2022-10-28 NOTE — TELEPHONE ENCOUNTER
[facility] stating that they have tried to contact us regarding refills for the pts prescriptions       Please send the following ASAP to [phone]

## 2022-10-31 ENCOUNTER — ANTICOAG VISIT (OUTPATIENT)
Dept: FAMILY MEDICINE CLINIC | Facility: CLINIC | Age: 72
End: 2022-10-31

## 2022-11-03 DIAGNOSIS — I50.32 CHRONIC DIASTOLIC (CONGESTIVE) HEART FAILURE (HCC): ICD-10-CM

## 2022-11-03 RX ORDER — DOXAZOSIN MESYLATE 1 MG/1
1 TABLET ORAL
Qty: 90 TABLET | Refills: 1 | OUTPATIENT
Start: 2022-11-03 | End: 2022-12-03

## 2022-11-07 DIAGNOSIS — I50.32 CHRONIC DIASTOLIC (CONGESTIVE) HEART FAILURE (HCC): ICD-10-CM

## 2022-11-07 RX ORDER — DOXAZOSIN MESYLATE 1 MG/1
1 TABLET ORAL
Qty: 90 TABLET | Refills: 1 | Status: SHIPPED | OUTPATIENT
Start: 2022-11-07 | End: 2022-12-07

## 2022-11-09 ENCOUNTER — HOSPITAL ENCOUNTER (OUTPATIENT)
Dept: INFUSION CENTER | Facility: HOSPITAL | Age: 72
Discharge: HOME | End: 2022-11-09
Attending: INTERNAL MEDICINE

## 2022-11-09 VITALS
SYSTOLIC BLOOD PRESSURE: 159 MMHG | HEART RATE: 58 BPM | TEMPERATURE: 96.7 F | OXYGEN SATURATION: 96 % | DIASTOLIC BLOOD PRESSURE: 72 MMHG

## 2022-11-09 DIAGNOSIS — N18.4 ANEMIA DUE TO STAGE 4 CHRONIC KIDNEY DISEASE (HCC): ICD-10-CM

## 2022-11-09 DIAGNOSIS — N05.9 GLOMERULONEPHRITIS: Primary | ICD-10-CM

## 2022-11-09 DIAGNOSIS — D63.1 ANEMIA DUE TO STAGE 4 CHRONIC KIDNEY DISEASE (HCC): ICD-10-CM

## 2022-11-09 RX ADMIN — EPOETIN ALFA 15000 UNITS: 20000 SOLUTION INTRAVENOUS; SUBCUTANEOUS at 14:17

## 2022-11-09 NOTE — PROGRESS NOTES
Pt arrived amb with walker for Epogen injection  Pt c/o muscle and joint pains all over his body about 4 days prior to each injection appt, ever since he started this med  Instructions reviewed  Epogen given SQ LEA, kavitha applied  Disch amb with walker to home

## 2022-11-14 ENCOUNTER — DOCUMENTATION (OUTPATIENT)
Dept: NEPHROLOGY | Facility: HOSPITAL | Age: 72
End: 2022-11-14

## 2022-11-14 ENCOUNTER — TELEPHONE (OUTPATIENT)
Dept: FAMILY MEDICINE CLINIC | Facility: CLINIC | Age: 72
End: 2022-11-14

## 2022-11-14 ENCOUNTER — ANTICOAG VISIT (OUTPATIENT)
Dept: FAMILY MEDICINE CLINIC | Facility: CLINIC | Age: 72
End: 2022-11-14

## 2022-11-14 LAB
CREAT 24H UR-MCNC: 37.7 MG/DL
EXT BILIRUBIN, UA: NEGATIVE
EXT BLOOD, UA: ABNORMAL
EXT COLOR, UA: YELLOW
EXT DIFF-ABS BASOPHILS: 1
EXT DIFF-ABS EOSINOPHILS: 1
EXT DIFF-ABS LYMPHOCYTES: 20
EXT DIFF-ABS MONOCYTES: 8
EXT DIFF-ABS NEUTROPHILS: 70
EXT GLUCOSE BLD: 110
EXT GLUCOSE, UA: NEGATIVE
EXT KETONES: NEGATIVE
EXT NITRITE, UA: NEGATIVE
EXT PH, UA: 5
EXT PROTEIN, UA: ABNORMAL
EXT SPECIFIC GRAVITY, UA: 1.01
EXT UROBILINOGEN: 0.2
EXTERNAL ALBUMIN: 4.5
EXTERNAL ALK PHOS: 85
EXTERNAL ALT: 8
EXTERNAL AST: 17
EXTERNAL BACTERIA (UA): ABNORMAL
EXTERNAL BICARBONATE: 25
EXTERNAL BUN: 59
EXTERNAL CALCIUM: 9.3
EXTERNAL CASTS (UA): ABNORMAL
EXTERNAL CHLORIDE: 100
EXTERNAL CREATININE: 2.93
EXTERNAL HEMATOCRIT: 30 %
EXTERNAL HEMOGLOBIN: 9.9 G/DL
EXTERNAL MCV: 92
EXTERNAL PLATELET COUNT: 207 K/ÂΜL
EXTERNAL POTASSIUM: 3.9
EXTERNAL RBC (UA): ABNORMAL
EXTERNAL RBC: 3.23
EXTERNAL RDW: 12.4
EXTERNAL SODIUM: 140
EXTERNAL T.BILIRUBIN: 0.3
EXTERNAL TOTAL PROTEIN: 6.8
EXTERNAL WBC (UA): ABNORMAL
EXTERNAL WBC: 5.4
PROT SNV-MCNC: 28.9 MG/DL
PROTEIN/CREAT RATIO (HISTORICAL): 767

## 2022-11-14 NOTE — TELEPHONE ENCOUNTER
----- Message from Krissy Oliveira MD sent at 11/6/2022 10:57 AM EST -----  Regarding: Medicare wellness  Patient should probably have Medicare wellness before the end of the year  This could be a video if patient would prefer to not make journey into the office

## 2022-11-14 NOTE — TELEPHONE ENCOUNTER
Left VM for pt to call and schedule [facility]  This can be a VV if pt is not feeling up to travel  Please schedule before the end of the year

## 2022-11-16 DIAGNOSIS — E78.5 DYSLIPIDEMIA: ICD-10-CM

## 2022-11-16 RX ORDER — PRAVASTATIN SODIUM 40 MG
TABLET ORAL
Qty: 90 TABLET | Refills: 1 | Status: SHIPPED | OUTPATIENT
Start: 2022-11-16

## 2022-11-18 ENCOUNTER — APPOINTMENT (OUTPATIENT)
Dept: RADIOLOGY | Facility: CLINIC | Age: 72
End: 2022-11-18

## 2022-11-18 DIAGNOSIS — Z98.1 S/P CERVICAL SPINAL FUSION: ICD-10-CM

## 2022-11-18 LAB
CREAT ?TM UR-SCNC: 50.2 UMOL/L
EXT PROTEIN URINE: 28.9
PROT/CREAT UR: 576 MG/G{CREAT}

## 2022-11-21 ENCOUNTER — TELEPHONE (OUTPATIENT)
Dept: NEPHROLOGY | Facility: CLINIC | Age: 72
End: 2022-11-21

## 2022-11-21 ENCOUNTER — TELEPHONE (OUTPATIENT)
Dept: NEPHROLOGY | Facility: HOSPITAL | Age: 72
End: 2022-11-21

## 2022-11-21 ENCOUNTER — DOCUMENTATION (OUTPATIENT)
Dept: NEPHROLOGY | Facility: HOSPITAL | Age: 72
End: 2022-11-21

## 2022-11-21 LAB
CREAT 24H UR-MCNC: 50.2 MG/DL
EXT DIFF-ABS BASOPHILS: 1
EXT DIFF-ABS EOSINOPHILS: 1
EXT DIFF-ABS LYMPHOCYTES: 20
EXT DIFF-ABS MONOCYTES: 7
EXT DIFF-ABS NEUTROPHILS: 71
EXT GLUCOSE BLD: 112
EXTERNAL ALBUMIN: 4.4
EXTERNAL ALK PHOS: 80
EXTERNAL ALT: 9
EXTERNAL AST: 14
EXTERNAL BICARBONATE: 25
EXTERNAL BUN: 54
EXTERNAL CALCIUM: 9.3
EXTERNAL CHLORIDE: 99
EXTERNAL CREATININE: 3.25
EXTERNAL HEMATOCRIT: 31 %
EXTERNAL HEMOGLOBIN: 10.3 G/DL
EXTERNAL IRON: 45
EXTERNAL MCV: 91
EXTERNAL PLATELET COUNT: 265 K/ÂΜL
EXTERNAL POTASSIUM: 4.4
EXTERNAL RBC: 3.4
EXTERNAL RDW: 12.6
EXTERNAL SODIUM: 139
EXTERNAL T.BILIRUBIN: 0.2
EXTERNAL TOTAL PROTEIN: 7
EXTERNAL WBC: 6
PROT SNV-MCNC: 28.9 MG/DL
PROTEIN/CREAT RATIO (HISTORICAL): 576

## 2022-11-21 NOTE — TELEPHONE ENCOUNTER
Called and spoke with patient  Patient is asking if he should get his Epogen injection on Wednesday 11/23 he is due by that time  He also has an appointment with Dr Janina Galarza on 11/28  Hemoglobin 10  He wants to know if he hold on Epogen until he see Dr Janina Galarza

## 2022-11-21 NOTE — TELEPHONE ENCOUNTER
Pt left a voice mail on our phone  Says he has questions in regards to his Epogen injections  Requesting a call back  [phone]

## 2022-11-22 ENCOUNTER — OFFICE VISIT (OUTPATIENT)
Dept: NEUROSURGERY | Facility: CLINIC | Age: 72
End: 2022-11-22

## 2022-11-22 VITALS
HEART RATE: 51 BPM | BODY MASS INDEX: 35.56 KG/M2 | RESPIRATION RATE: 18 BRPM | TEMPERATURE: 97.5 F | WEIGHT: 254 LBS | SYSTOLIC BLOOD PRESSURE: 148 MMHG | DIASTOLIC BLOOD PRESSURE: 70 MMHG | HEIGHT: 71 IN

## 2022-11-22 DIAGNOSIS — Z98.1 S/P CERVICAL SPINAL FUSION: Primary | ICD-10-CM

## 2022-11-22 DIAGNOSIS — M47.12 OTHER SPONDYLOSIS WITH MYELOPATHY, CERVICAL REGION: ICD-10-CM

## 2022-11-22 DIAGNOSIS — R26.89 BALANCE DISORDER: ICD-10-CM

## 2022-11-22 DIAGNOSIS — Z91.81 AT HIGH RISK FOR INJURY RELATED TO FALL: ICD-10-CM

## 2022-11-22 RX ORDER — EPOETIN ALFA 2000 [IU]/ML
SOLUTION INTRAVENOUS; SUBCUTANEOUS
COMMUNITY
Start: 2022-09-06

## 2022-11-22 RX ORDER — ACETAMINOPHEN 500 MG
500 TABLET ORAL AS NEEDED
COMMUNITY

## 2022-11-22 NOTE — PROGRESS NOTES
DISCUSSION SUMMARY  This is a 71 y o  male who is status post a combined anterior-posterior fixation fusion  This was complicated by a postoperative collection and debridement  He is improving neurologically although he continues to be at high risk for gait and balance problems and is at a high fall risk because of this  Using a walker and was encouraged to use this on a regular basis  I did recommend continued physical therapy and    Return in about 4 months (around 3/15/2023) for follow-up after test         Diagnosis ICD-10-CM Associated Orders   1  S/P cervical spinal fusion  Z98 1 Ambulatory referral to Physical Therapy     MRI cervical spine without contrast      2  At high risk for injury related to fall  Z91 [id number] Ambulatory referral to Physical Therapy     MRI cervical spine without contrast      3  Balance disorder  R26 89 Ambulatory referral to Physical Therapy     MRI cervical spine without contrast      4  Other spondylosis with myelopathy, cervical region  M47 12 Ambulatory referral to Physical Therapy     MRI cervical spine without contrast             Chief Complaint   Patient presents with   • Follow-up     6 WEEK F/U W/XR CSPINE (6+ VW)   S/P ACDF C5-6 AND C6-7; PCDF C3-T1 3/11/22 (DKO)  S/P POSTERIOR CERVICAL EVACUATION OF POSTOPERATIVE COLLECTION AND DEBRIDEMENT WITH PLACEMENT OF DRAINS C3-T1 4/1/22 (DKO)       HPI overall patient's kidney function has been improving  Does have muscle aches and pains but feels that these are made much worse with Epogen injections and the far that he gets out from the Epogen injections the better his function is  He continues to have some balance difficulties but has not fallen  Uses a walker  He does feel that physical therapy benefits him tremendously  Has generalized tremors that occur when he gets weaker but these go away when he is able to rest   He is had no problems with his incisions    In general he feels that he is improving albeit slowly  Review of Systems   Constitutional: Positive for activity change and fatigue (mild)  HENT: Positive for hearing loss (wears B/L hearing aids)  Eyes: Negative  Respiratory: Negative  Cardiovascular: Negative  Gastrointestinal: Negative  Endocrine: Negative  Genitourinary: Positive for urgency  Some leakage related to urgency   Musculoskeletal: Positive for arthralgias (generalized), back pain (centered of mid back, sharp, stabbing pain and comes and goes), gait problem (unchanged since last visit, using rolling walker, off balance), myalgias (spasms and cramps in b/l shoulders), neck pain (Constant left sided neck pain and radiates into left shoulder) and neck stiffness (L>R limited ROM)  Per patient, feels same as last visit    ROM when turning left side of neck has quick sharp pain rates 7-8/10    MEDIATIONS: tylenol -- mild pain relief    PT - last week -- not helpful    No falls   Skin: Negative  Allergic/Immunologic: Negative  Neurological: Positive for tremors (b/l hands R>L), weakness (b/l arms; occasionally drops objects from b/l hands), light-headedness (episode of about 3 days about 1-2 weeks ago) and headaches (unchanged since last visit, occasionally due to neck pain and usually in the am when waking up)  Negative for dizziness, seizures, syncope and numbness  Hematological: Bruises/bleeds easily  Psychiatric/Behavioral: Negative            Vitals:    /70   Pulse (!) 51   Temp 97 5 °F (36 4 °C)   Resp 18   Ht 5' 11" (1 803 m)   Wt 115 kg (254 lb)   BMI 35 43 kg/m²       MEDICAL HISTORY  Past Medical History:   Diagnosis Date   • Acute venous embolism and thrombosis of deep vessels of proximal lower extremity (HCC)     Last assessed: 5/18/15   • Arthritis    • Cellulitis     LE   • Chronic kidney disease 3/2020    Acute Kidney Injury   • CPAP (continuous positive airway pressure) dependence    • Factor V Leiden ([facility] [address] )    • Forgetfulness • Gross hematuria 5/17/2022   • Headache(784 0) 3/2022    Never till cervical  spine surgery   • Heart murmur 3/2020    Told when in hospital   • [facility] (hard of hearing)    • Hypoalbuminemia 5/11/2022   • Paroxysmal atrial fibrillation (HCC)     Last assessed: 11/2/15   • Sleep apnea      Past Surgical History:   Procedure Laterality Date   • BACK SURGERY      Lower   • COLON SURGERY     • COLONOSCOPY     • INCISION AND DRAINAGE POSTERIOR SPINE N/A 4/1/2022    Procedure: Posterior cervical evacuation of postoperative collection and debridement with placement of drains C3-T1; Surgeon: Mary Edmondson MD;  Location: [facility];  Service: Neurosurgery   • IR BIOPSY KIDNEY RANDOM  5/26/2022   • IR TUNNELED DIALYSIS CATHETER PLACEMENT  5/23/2022   • IR TUNNELED DIALYSIS CATHETER REMOVAL  6/2/2022   • [place] ARTHRODESIS ANT INTERBODY MIN DISCECTOMY, CERVICAL BELOW C2 N/A 3/11/2022    Procedure: Anterior cervical discectomy and fixation fusion C5/6 and C6/7; Posterior cervical decompression and instrumented fusion C3-T1;   Surgeon: Mary Edmondson MD;  Location: [facility];  Service: Neurosurgery   • SPINE SURGERY  3/11/2022    Cervical myelopathy/ cervical fusion     Social History     Tobacco Use   • Smoking status: Never   • Smokeless tobacco: Never   Vaping Use   • Vaping Use: Never used   Substance Use Topics   • Alcohol use: Not Currently     Alcohol/week: 0 0 standard drinks   • Drug use: No        Current Outpatient Medications:   •  acetaminophen (TYLENOL) 500 mg tablet, Take 500 mg by mouth if needed for mild pain, Disp: , Rfl:   •  amLODIPine (NORVASC) 5 mg tablet, Take 1 tablet (5 mg total) by mouth daily, Disp: 90 tablet, Rfl: 3  •  doxazosin (CARDURA) 1 mg tablet, Take 1 tablet (1 mg total) by mouth daily at bedtime, Disp: 90 tablet, Rfl: 1  •  DULoxetine (CYMBALTA) 60 mg delayed release capsule, Take 1 capsule (60 mg total) by mouth daily, Disp: 90 capsule, Rfl: 1  •  epoetin asha (Epogen) 2,000 units/mL, Inject under the skin every 14 (fourteen) days, Disp: , Rfl:   •  flecainide (TAMBOCOR) 50 mg tablet, Take 1 tablet (50 mg total) by mouth 2 (two) times a day, Disp: 180 tablet, Rfl: 3  •  gabapentin (Neurontin) 300 mg capsule, Take 1 capsule (300 mg total) by mouth daily at bedtime, Disp: 90 capsule, Rfl: 1  •  methocarbamol (ROBAXIN) 750 mg tablet, Take 1 tablet (750 mg total) by mouth every 6 (six) hours as needed for muscle spasms, Disp: 30 tablet, Rfl: 0  •  metoprolol succinate (TOPROL-XL) 100 mg 24 hr tablet, TAKE 1 TABLET BY MOUTH  DAILY, Disp: 90 tablet, Rfl: 0  •  oxyCODONE (ROXICODONE) 5 immediate release tablet, Take 1 tablet (5 mg total) by mouth every 6 (six) hours as needed for moderate pain Max Daily Amount: 20 mg (Patient taking differently: Take 5 mg by mouth if needed for moderate pain), Disp: 10 tablet, Rfl: 0  •  pantoprazole (PROTONIX) 40 mg tablet, Take 1 tablet (40 mg total) by mouth daily, Disp: 180 tablet, Rfl: 3  •  pravastatin (PRAVACHOL) 40 mg tablet, TAKE 1 TABLET BY MOUTH  DAILY, Disp: 90 tablet, Rfl: 1  •  tamsulosin (FLOMAX) 0 4 mg, Take 1 capsule (0 4 mg total) by mouth daily with dinner, Disp: 90 capsule, Rfl: 1  •  torsemide (DEMADEX) 20 mg tablet, Take 1 tablet (20 mg total) by mouth daily, Disp: 90 tablet, Rfl: 1  •  warfarin (COUMADIN) 5 mg tablet, Take 2 5 mg (1/2 tablets) daily except Wednesday and Saturday take 5 mg daily  , Disp: 90 tablet, Rfl: 1  •  zolpidem (AMBIEN) 10 mg tablet, Take 1 tablet (10 mg total) by mouth daily at bedtime as needed for sleep, Disp: 90 tablet, Rfl: 3  •  acetaminophen (TYLENOL) 325 mg tablet, Take 3 tablets (975 mg total) by mouth 3 (three) times a day as needed for mild pain (Patient not taking: Reported on 11/22/2022), Disp: , Rfl: 0  •  Blood Glucose Monitoring Suppl (Accu-Chek Guide) w/Device KIT, , Disp: , Rfl:   •  calcium carbonate-vitamin D (OSCAL-D) 500 mg-200 units per tablet, Take 1 tablet by mouth daily with breakfast, Disp: 30 tablet, Rfl: 0  •  glucose blood (Accu-Chek Guide) test strip, USE TO TEST AS DIRECTED, Disp: 50 strip, Rfl: 2  •  predniSONE 20 mg tablet, 60mg daily for 4 weeks, then 40mg daily x 2 weeks starting June 30th, 2022  Then 30mg daily x 2 weeks, 20mg daily x 2 weeks, 10mg daily x 2 weeks, 5mg daily, Disp: 270 tablet, Rfl: 0   Allergies   Allergen Reactions   • Morphine GI Intolerance   • Vitamin K Other (See Comments)     On coumadin-patient sensitive to vitamin K amounts in meds etc         The following portions of the patient's history were updated by MA and reviewed by MD: allergies, current medications, past family history, past medical history, past social history, past surgical history and problem list       Physical Exam  Vitals and nursing note reviewed  Constitutional:       General: He is not in acute distress  Appearance: Normal appearance  He is normal weight  He is not ill-appearing, toxic-appearing or diaphoretic  HENT:      Head: Normocephalic and atraumatic  Nose: Nose normal    Eyes:      Extraocular Movements: Extraocular movements intact  Pupils: Pupils are equal, round, and reactive to light  Musculoskeletal:         General: Tenderness (Tenderness with range of motion to the right lateral region) present  No swelling, deformity or signs of injury  Cervical back: Normal range of motion and neck supple  Right lower leg: No edema  Left lower leg: No edema  Comments: Range of motion is reduced in the cervical spine    He has bilateral knee structural problems with a deviation of his knees and he has been told that he needs replacement  This affects his walking as well  Skin:     General: Skin is warm and dry  Neurological:      Mental Status: He is alert and oriented to person, place, and time  Mental status is at baseline  Cranial Nerves: No cranial nerve deficit  Sensory: No sensory deficit  Motor: No weakness        Coordination: Coordination normal       Gait: Gait abnormal (  abnormal gait and is dependent upon a walker or contact support  )  Deep Tendon Reflexes: Reflexes normal    Psychiatric:         Mood and Affect: Mood normal          Behavior: Behavior normal          Thought Content:  Thought content normal          Judgment: Judgment normal            RESULTS/DATA  I have personally reviewed pertinent films in PACS   X-rays of the cervical spine demonstrate the instrumentation to be in good position without signs of loosening or breakage

## 2022-11-22 NOTE — TELEPHONE ENCOUNTER
Called ans spoke to patient  Patient aware Per the epogen order in [facility] it says to hold for hgb >11  Recent hgb 10 so would still go for his epogen injection  Patient already schedule

## 2022-11-23 ENCOUNTER — HOSPITAL ENCOUNTER (OUTPATIENT)
Dept: INFUSION CENTER | Facility: HOSPITAL | Age: 72
Discharge: HOME | End: 2022-11-23
Attending: INTERNAL MEDICINE

## 2022-11-23 VITALS
RESPIRATION RATE: 16 BRPM | OXYGEN SATURATION: 99 % | DIASTOLIC BLOOD PRESSURE: 88 MMHG | TEMPERATURE: 97.2 F | HEART RATE: 58 BPM | SYSTOLIC BLOOD PRESSURE: 151 MMHG

## 2022-11-23 DIAGNOSIS — N05.9 GLOMERULONEPHRITIS: Primary | ICD-10-CM

## 2022-11-23 DIAGNOSIS — N18.4 ANEMIA DUE TO STAGE 4 CHRONIC KIDNEY DISEASE (HCC): ICD-10-CM

## 2022-11-23 DIAGNOSIS — D63.1 ANEMIA DUE TO STAGE 4 CHRONIC KIDNEY DISEASE (HCC): ICD-10-CM

## 2022-11-23 RX ADMIN — EPOETIN ALFA 15000 UNITS: 20000 SOLUTION INTRAVENOUS; SUBCUTANEOUS at 13:36

## 2022-11-23 NOTE — PLAN OF CARE
Problem: Knowledge Deficit  Goal: Patient/family/caregiver demonstrates understanding of disease process, treatment plan, medications, and discharge instructions  Description: Complete learning assessment and assess knowledge base    Interventions:  - Provide teaching at level of understanding  - Provide teaching via preferred learning methods  11/23/2022 1318 by Zia Loera RN  Outcome: Progressing  11/23/2022 1315 by Zia Loera RN  Outcome: Progressing     Problem: Potential for Falls  Goal: Patient will remain free of falls  Description: INTERVENTIONS:  - Educate patient/family on patient safety including physical limitations  - Instruct patient to call for assistance with activity   - Keep Call bell within reach  - Keep bed low and locked with side rails adjusted as appropriate  - Keep care items and personal belongings within reach  - Initiate and maintain comfort rounds  - Consider moving patient to room near nurses station  Outcome: Progressing

## 2022-11-23 NOTE — PROGRESS NOTES
Pt here for EPOETIN injection  Tolerated well no adverse reactions AVS provided  Pt left ambulatory with steady gait utilizing a walker

## 2022-11-25 ENCOUNTER — TELEPHONE (OUTPATIENT)
Dept: NEPHROLOGY | Facility: CLINIC | Age: 72
End: 2022-11-25

## 2022-11-25 NOTE — TELEPHONE ENCOUNTER
Appointment Confirmation   Person confirmed appointment with  If not patient, name of the person Answering Machine    Date and time of appointment 11/28    Patient acknowledged and will be at appointment? no    Did you advise the patient that they will need a urine sample if they are a new patient?  N/A    Did you advise the patient to bring their current medications for verification? (including any OTC) Yes    Additional Information

## 2022-11-28 ENCOUNTER — ANTICOAG VISIT (OUTPATIENT)
Dept: FAMILY MEDICINE CLINIC | Facility: CLINIC | Age: 72
End: 2022-11-28

## 2022-11-28 ENCOUNTER — OFFICE VISIT (OUTPATIENT)
Dept: NEPHROLOGY | Facility: HOSPITAL | Age: 72
End: 2022-11-28

## 2022-11-28 VITALS
WEIGHT: 252 LBS | BODY MASS INDEX: 35.28 KG/M2 | DIASTOLIC BLOOD PRESSURE: 80 MMHG | HEIGHT: 71 IN | SYSTOLIC BLOOD PRESSURE: 150 MMHG

## 2022-11-28 DIAGNOSIS — E66.01 OBESITY, MORBID (HCC): ICD-10-CM

## 2022-11-28 DIAGNOSIS — E83.52 HYPERCALCEMIA: ICD-10-CM

## 2022-11-28 DIAGNOSIS — R31.29 MICROSCOPIC HEMATURIA: ICD-10-CM

## 2022-11-28 DIAGNOSIS — R60.0 LOWER EXTREMITY EDEMA: ICD-10-CM

## 2022-11-28 DIAGNOSIS — I50.32 CHRONIC DIASTOLIC (CONGESTIVE) HEART FAILURE (HCC): ICD-10-CM

## 2022-11-28 DIAGNOSIS — D63.1 ANEMIA DUE TO STAGE 4 CHRONIC KIDNEY DISEASE (HCC): ICD-10-CM

## 2022-11-28 DIAGNOSIS — E83.39 HYPERPHOSPHATEMIA: ICD-10-CM

## 2022-11-28 DIAGNOSIS — R80.8 OTHER PROTEINURIA: ICD-10-CM

## 2022-11-28 DIAGNOSIS — N18.4 ANEMIA DUE TO STAGE 4 CHRONIC KIDNEY DISEASE (HCC): ICD-10-CM

## 2022-11-28 DIAGNOSIS — N02.8 IGA NEPHROPATHY DETERMINED BY BIOPSY OF KIDNEY: Primary | ICD-10-CM

## 2022-11-28 DIAGNOSIS — E87.1 HYPONATREMIA: ICD-10-CM

## 2022-11-28 DIAGNOSIS — N17.9 AKI (ACUTE KIDNEY INJURY) (HCC): ICD-10-CM

## 2022-11-28 NOTE — PROGRESS NOTES
NEPHROLOGY OUTPATIENT PROGRESS NOTE   Sophy Beard 71 y o  male MRN: [id number]  DATE: 11/28/2022  Reason for visit:   Chief Complaint   Patient presents with   • Follow-up   • Chronic Kidney Disease        Patient Instructions   1  JANEL d/t IgAN, biopsy proven, in setting of recent MRSA infection  -renal biopsy performed May 26, 2022 was limited as only 6 intact glomeruli were present for evaluation on light microscopy  IgA dominant immune complex deposition noted by immunofluorescence  Differential includes IgA nephropathy both primary and secondary forms versus infectious associated glomerulonephritis  Staph aureus infections have been associated with IgA dominant immune complex deposition  Patient did have recent MRSA surgical site infection so infection associated GN should be considered   -prednisone 60 mg daily begun June 2nd, 2022  S/p prednisone taper, completed 9/1/22  -continue to monitor weekly bloodwork and urine testing  -if this was a primary IgA nephropathy, it would be compatible with 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  -DEXA scan shows normal bone density  -Off prednisone since 9/1/22  -off bactrim  -may need to consider rituxan infusion in light of podocytopathy if renal function/proteinuria does not continue to improve   Thankfully, sCr stable and proteinuria continues to improve     2  Hyponatremia, hypercalcemia - resolved     3  Hyperphosphatemia - phos 4 4 as of 9/12/22 and stable, monitor phos monthly  Will hold off on restarting Renagel 800 mg 3 times daily with meals for now  Likely d/t decreased excretion in setting of JANEL  4  Proteinuria - UpCr in setting of IgAN shows UpCr improved from 18 8g to 1 5g as of 8/15/22, with latest UpCr 467 mg/g as of 11/26/22  -consider ACEi initiation if UpCr rises above 1g      5  HTN - BP elevated in office  Continue metoprolol 100 mg daily, Cardura 1 mg at bedtime, amlodipine 5 mg daily, flomax, torsemide 20mg daily     6  Anemia ? D/t underlying GN/CKD - Hgb 10 3, received blood transfusion in the past, monitor CBC, continue scheduled epogen infusions 63329g every 2 weeks, goal Hgb 10-11    -monitor CBC weekly     7  LE edema likely d/t nephrotic syndrome - continue torsemide 20mg daily, will monitor K/SCr weekly  Monitor daily weight/BP readings  Will continue weekly CMP, CBC, phosphorus, Urinalysis and urine protein:Cr  Continue torsemide 20mg daily for edema/nephrotic syndrome  RTC in 1-2 months  Titus Hernandez was seen today for follow-up and chronic kidney disease      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  -     Comprehensive metabolic panel; Standing  -     CBC and differential; Standing  -     Phosphorus; Standing  -     Urinalysis with microscopic; Standing  -     Protein / creatinine ratio, urine; Standing  -     Comprehensive metabolic panel  -     CBC and differential  -     Phosphorus  -     Urinalysis with microscopic  -     Protein / creatinine ratio, urine    Chronic diastolic (congestive) heart failure (HCC)    JANEL (acute kidney injury) (HCC)    Microscopic hematuria  -     Urinalysis with microscopic; Standing  -     Urinalysis with microscopic    Anemia due to stage 4 chronic kidney disease (HCC)  -     CBC and differential; Standing  -     CBC and differential    Hypercalcemia  -     Comprehensive metabolic panel; Standing  -     Comprehensive metabolic panel    Hyperphosphatemia  -     Phosphorus; Standing  -     Phosphorus    Hyponatremia  -     Comprehensive metabolic panel; Standing  -     Comprehensive metabolic panel    Lower extremity edema    Other proteinuria  -     Protein / creatinine ratio, urine; Standing  -     Protein / creatinine ratio, urine    Obesity, morbid (HCC)        Assessment/Plan:  1  JANEL d/t IgAN, biopsy proven, in setting of recent MRSA infection  -renal biopsy performed May 26, 2022 was limited as only 6 intact glomeruli were present for evaluation on light microscopy   IgA dominant immune complex deposition noted by immunofluorescence   Differential includes IgA nephropathy both primary and secondary forms versus infectious associated glomerulonephritis   Staph aureus infections have been associated with IgA dominant immune complex deposition   Patient did have recent MRSA surgical site infection so infection associated GN should be considered   -prednisone 60 mg daily begun June 2nd, 2022   S/p prednisone taper, completed 9/1/22  -continue to monitor weekly bloodwork and urine testing  -if this was a primary IgA nephropathy, it would be compatible with 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  -DEXA scan shows normal bone density  -Off prednisone since 9/1/22  -off bactrim  -may need to consider rituxan infusion in light of podocytopathy if renal function/proteinuria does not continue to improve  Thankfully, sCr stable and proteinuria continues to improve     2  Hyponatremia, hypercalcemia - resolved     3  Hyperphosphatemia - phos 4 4 as of 9/12/22 and stable, monitor phos monthly   Will hold off on restarting Renagel 800 mg 3 times daily with meals for now  Mary Castillo d/t decreased excretion in setting of JANEL        4  Proteinuria - UpCr in setting of IgAN shows UpCr improved from 18 8g to 1 5g as of 8/15/22, with latest UpCr 467 mg/g as of 11/26/22  -consider ACEi initiation if UpCr rises above 1g      5  HTN - BP elevated in office  Continue metoprolol 100 mg daily, Cardura 1 mg at bedtime, amlodipine 5 mg daily, flomax, torsemide 20mg daily     6  Anemia ? D/t underlying GN/CKD - Hgb 10 3, received blood transfusion in the past, monitor CBC, continue scheduled epogen infusions 16049o every 2 weeks, goal Hgb 10-11    -monitor CBC weekly     7  LE edema likely d/t nephrotic syndrome - continue torsemide 20mg daily, will monitor K/SCr weekly  Monitor daily weight/BP readings      Will continue weekly CMP, CBC, phosphorus, Urinalysis and urine protein:Cr  Continue torsemide 20mg daily for edema/nephrotic syndrome  RTC in 1-2 months  SUBJECTIVE / INTERVAL HISTORY:  71 y o  male presents in follow up of IgAN  [place] denies any recent illness/hospitalizations/medication changes since last office visit  Denies NSAID use  Does have leg edema  More active since starting epogen shots  Review of Systems   Constitutional: Negative for chills and fever  HENT: Negative for sore throat  Eyes: Negative for visual disturbance  Respiratory: Negative for cough and shortness of breath  Cardiovascular: Positive for leg swelling (around the sock line)  Negative for chest pain     Gastrointestinal: Negative for abdominal pain, constipation, diarrhea, nausea and vomiting  Endocrine: Negative for polyuria  Genitourinary: Negative for decreased urine volume, difficulty urinating, dysuria and hematuria  Musculoskeletal: Negative for back pain and myalgias  Skin: Negative for rash  Neurological: Negative for dizziness, light-headedness and numbness  Balance issues   Psychiatric/Behavioral: Negative for confusion  OBJECTIVE:  /80 (BP Location: Left arm, Patient Position: Sitting, Cuff Size: Large)   Ht 5' 11" (1 803 m)   Wt 114 kg (252 lb)   BMI 35 15 kg/m²  Body mass index is 35 15 kg/m²  Physical exam:  Physical Exam  Vitals reviewed  Constitutional:       General: He is not in acute distress  Appearance: Normal appearance  He is well-developed and well-nourished  He is not diaphoretic  HENT:      Head: Normocephalic and atraumatic  Ears:      Comments: Hearing aids     Nose: Nose normal       Mouth/Throat:      Mouth: Mucous membranes are moist       Pharynx: No oropharyngeal exudate  Eyes:      General: No scleral icterus  Right eye: No discharge  Left eye: No discharge  Neck:      Thyroid: No thyromegaly  Cardiovascular:      Rate and Rhythm: Normal rate and regular rhythm  Heart sounds: Normal heart sounds  Pulmonary:      Effort: Pulmonary effort is normal       Breath sounds: Normal breath sounds  No wheezing or rales  Abdominal:      General: Bowel sounds are normal  There is no distension  Palpations: Abdomen is soft  Tenderness: There is no abdominal tenderness  Musculoskeletal:         General: Swelling (b/l ankles) present  No edema  Normal range of motion  Cervical back: Neck supple  Lymphadenopathy:      Cervical: No cervical adenopathy  Skin:     General: Skin is warm and dry  Findings: No rash  Neurological:      General: No focal deficit present  Mental Status: He is alert        Comments: awake Psychiatric:         Mood and Affect: Mood and affect and mood normal          Behavior: Behavior normal          Medications:    Current Outpatient Medications:   •  acetaminophen (TYLENOL) 500 mg tablet, Take 500 mg by mouth if needed for mild pain, Disp: , Rfl:   •  amLODIPine (NORVASC) 5 mg tablet, Take 1 tablet (5 mg total) by mouth daily, Disp: 90 tablet, Rfl: 3  •  doxazosin (CARDURA) 1 mg tablet, Take 1 tablet (1 mg total) by mouth daily at bedtime, Disp: 90 tablet, Rfl: 1  •  DULoxetine (CYMBALTA) 60 mg delayed release capsule, Take 1 capsule (60 mg total) by mouth daily, Disp: 90 capsule, Rfl: 1  •  epoetin asha (Epogen) 2,000 units/mL, Inject under the skin every 14 (fourteen) days, Disp: , Rfl:   •  flecainide (TAMBOCOR) 50 mg tablet, Take 1 tablet (50 mg total) by mouth 2 (two) times a day, Disp: 180 tablet, Rfl: 3  •  gabapentin (Neurontin) 300 mg capsule, Take 1 capsule (300 mg total) by mouth daily at bedtime, Disp: 90 capsule, Rfl: 1  •  methocarbamol (ROBAXIN) 750 mg tablet, Take 1 tablet (750 mg total) by mouth every 6 (six) hours as needed for muscle spasms, Disp: 30 tablet, Rfl: 0  •  metoprolol succinate (TOPROL-XL) 100 mg 24 hr tablet, TAKE 1 TABLET BY MOUTH  DAILY, Disp: 90 tablet, Rfl: 0  •  oxyCODONE (ROXICODONE) 5 immediate release tablet, Take 1 tablet (5 mg total) by mouth every 6 (six) hours as needed for moderate pain Max Daily Amount: 20 mg (Patient taking differently: Take 5 mg by mouth if needed for moderate pain), Disp: 10 tablet, Rfl: 0  •  pantoprazole (PROTONIX) 40 mg tablet, Take 1 tablet (40 mg total) by mouth daily, Disp: 180 tablet, Rfl: 3  •  pravastatin (PRAVACHOL) 40 mg tablet, TAKE 1 TABLET BY MOUTH  DAILY, Disp: 90 tablet, Rfl: 1  •  tamsulosin (FLOMAX) 0 4 mg, Take 1 capsule (0 4 mg total) by mouth daily with dinner, Disp: 90 capsule, Rfl: 1  •  torsemide (DEMADEX) 20 mg tablet, Take 1 tablet (20 mg total) by mouth daily, Disp: 90 tablet, Rfl: 1  •  warfarin (COUMADIN) 5 mg tablet, Take 2 5 mg (1/2 tablets) daily except Wednesday and Saturday take 5 mg daily  , Disp: 90 tablet, Rfl: 1  •  zolpidem (AMBIEN) 10 mg tablet, Take 1 tablet (10 mg total) by mouth daily at bedtime as needed for sleep, Disp: 90 tablet, Rfl: 3  •  acetaminophen (TYLENOL) 325 mg tablet, Take 3 tablets (975 mg total) by mouth 3 (three) times a day as needed for mild pain (Patient not taking: Reported on 11/22/2022), Disp: , Rfl: 0  •  Blood Glucose Monitoring Suppl (Accu-Chek Guide) w/Device KIT, , Disp: , Rfl:   •  glucose blood (Accu-Chek Guide) test strip, USE TO TEST AS DIRECTED, Disp: 50 strip, Rfl: 2    Allergies: Allergies as of 11/28/2022 - Reviewed 11/28/2022   Allergen Reaction Noted   • Morphine GI Intolerance 09/11/2018   • Vitamin k Other (See Comments) 12/28/2020       The following portions of the patient's history were reviewed and updated as appropriate: past family history, past surgical history and problem list     Laboratory Results:  Lab Results   Component Value Date    SODIUM 140 11/26/2022    K 4 1 11/26/2022     11/26/2022    CO2 25 11/26/2022    BUN 56 (H) 11/26/2022    CREATININE 3 23 (H) 11/26/2022    GLUC 128 (H) 11/26/2022    CALCIUM 9 3 11/18/2022        Lab Results   Component Value Date    CALCIUM 9 3 11/18/2022    PHOS 5 5 (H) 06/03/2022       Portions of the record may have been created with voice recognition software   Occasional wrong word or "sound a like" substitutions may have occurred due to the inherent limitations of voice recognition software   Read the chart carefully and recognize, using context, where substitutions have occurred

## 2022-11-28 NOTE — PATIENT INSTRUCTIONS
1  JANEL d/t IgAN, biopsy proven, in setting of recent MRSA infection  -renal biopsy performed May 26, 2022 was limited as only 6 intact glomeruli were present for evaluation on light microscopy  IgA dominant immune complex deposition noted by immunofluorescence  Differential includes IgA nephropathy both primary and secondary forms versus infectious associated glomerulonephritis  Staph aureus infections have been associated with IgA dominant immune complex deposition  Patient did have recent MRSA surgical site infection so infection associated GN should be considered   -prednisone 60 mg daily begun June 2nd, 2022  S/p prednisone taper, completed 9/1/22  -continue to monitor weekly bloodwork and urine testing  -if this was a primary IgA nephropathy, it would be compatible with [facility] classification of M0 E1 S0 T1-C1   -of 38 glomeruli, 6 were intact, forehead global glomerulosclerosis, segmental sclerosis was absent  Moderate interstitial fibrosis and tubular atrophy as well as arterial intimal fibrosis and mild arterolar hyalinosis were noted  -did not require dialysis inpatient, permacath removed prior to discharge  -sCr 3 23 as of 11/26/22, slowly improving from 5 34 and seems to have stabilized  -BUN stable  -as the patient has had an acute onset of rapidly progressive glomerulonephritis with normal complement levels and deposition of mesangial IgA, 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  -DEXA scan shows normal bone density  -Off prednisone since 9/1/22  -off bactrim  -may need to consider rituxan infusion in light of podocytopathy if renal function/proteinuria does not continue to improve  Thankfully, sCr stable and proteinuria continues to improve     2  Hyponatremia, hypercalcemia - resolved     3  Hyperphosphatemia - phos 4 4 as of 9/12/22 and stable, monitor phos monthly  Will hold off on restarting Renagel 800 mg 3 times daily with meals for now    Likely d/t decreased excretion in setting of JANEL  4  Proteinuria - UpCr in setting of IgAN shows UpCr improved from 18 8g to 1 5g as of 8/15/22, with latest UpCr 467 mg/g as of 11/26/22  -consider ACEi initiation if UpCr rises above 1g      5  HTN - BP elevated in office  Continue metoprolol 100 mg daily, Cardura 1 mg at bedtime, amlodipine 5 mg daily, flomax, torsemide 20mg daily     6  Anemia ? D/t underlying GN/CKD - Hgb 10 3, received blood transfusion in the past, monitor CBC, continue scheduled epogen infusions 87713o every 2 weeks, goal Hgb 10-11    -monitor CBC weekly     7  LE edema likely d/t nephrotic syndrome - continue torsemide 20mg daily, will monitor K/SCr weekly  Monitor daily weight/BP readings  Will continue weekly CMP, CBC, phosphorus, Urinalysis and urine protein:Cr  Continue torsemide 20mg daily for edema/nephrotic syndrome  RTC in 1-2 months

## 2022-11-29 ENCOUNTER — EVALUATION (OUTPATIENT)
Dept: PHYSICAL THERAPY | Facility: CLINIC | Age: 72
End: 2022-11-29

## 2022-11-29 DIAGNOSIS — R26.9 GAIT ABNORMALITY: Primary | ICD-10-CM

## 2022-11-29 DIAGNOSIS — Z98.1 S/P CERVICAL SPINAL FUSION: ICD-10-CM

## 2022-11-29 DIAGNOSIS — Z91.81 AT HIGH RISK FOR INJURY RELATED TO FALL: ICD-10-CM

## 2022-11-29 DIAGNOSIS — R26.89 BALANCE DISORDER: ICD-10-CM

## 2022-11-29 DIAGNOSIS — M47.12 OTHER SPONDYLOSIS WITH MYELOPATHY, CERVICAL REGION: ICD-10-CM

## 2022-11-29 NOTE — PROGRESS NOTES
PT Evaluation     Today's date: 2022  Patient name: Alvaro Rai  : 1950  MRN: [id number]  Referring provider: Princess Amos,Fide  Dx:   Encounter Diagnosis     ICD-10-CM    1  Gait abnormality  R26 9       2  S/P cervical spinal fusion  Z98 1 Ambulatory referral to Physical Therapy      3  At high risk for injury related to fall  Z91 81 Ambulatory referral to Physical Therapy      4  Balance disorder  R26 89 Ambulatory referral to Physical Therapy      5  Other spondylosis with myelopathy, cervical region  M47 12 Ambulatory referral to Physical Therapy                     Assessment  Assessment details: Tracy Bustos is a 71year old male that presented to outpatient PT with the diagnoses of s/p cervical spinal fusion, high risk for injury related to fall, balance disorder, and other spondylosis with myelopathy of the cervical spine  He presented with decreased LE strength (R>L), pain in his knees with movement, difficulty with maintaining balance when in a SL moment, difficulty with maintaining narrow AZRA, which is impacting his balance and putting his at an increased risk for falls  This is impacting his ability to safely walk in his home and community, and he is unable to do the activities he enjoys doing such as hunting and fishing  The pt would benefit from skilled PT to improve his balance confidence, LE strength, and decrease his risk for falls  Impairments: impaired balance, impaired physical strength, pain with function and weight-bearing intolerance  Understanding of Dx/Px/POC: good   Prognosis: good    Goals  ST  Pt will improve TUG score by 3 seconds in 4 weeks to improve his LE output and decrease his risk for falls  2  Pt will improve BBS score by 7 points in 4 weeks to improve his balance and decrease the risk for falls  3  Pt will be independent with HEP in 4 weeks to improve strength and functional mobility  LT   Pt will ambulate with LRAD in 10 weeks to improve his QoL    2  Pt will improve BBS by 10 points in 10 weeks to improve his balance and decrease risk for falls  Plan  Patient would benefit from: skilled physical therapy  Planned therapy interventions: balance, gait training, home exercise program, strengthening, therapeutic exercise and therapeutic activities  Frequency: 2x week  Duration in weeks: 10  Plan of Care beginning date: 11/29/2022  Plan of Care expiration date: 2/7/2023        Subjective Evaluation    History of Present Illness  Mechanism of injury: Pt a 71year old male that presented to outpatient PT with the diagnoses of s/p cervical spinal fusion, high risk for injury related to fall, balance disorder, and other spondylosis with myelopathy of the cervical spine  In March, the pt noticed that he was having a balance problem with R sided weakness and numbness  The pt did not bring it to his wife's attention until 3 days later  Eventually, he was transferred to [address] for surgery  After surgery, everything seemed fine, but he was sent home from the hospital with a "hematoma" on his neck  The pt had a follow up with his doctor, where he was sent to the ED again  He was diagnosed with MRSA  He had another surgery to remove the hardware and put in new hardware  He spent a total of 3 months in the hospital, but only went home for a few days  After hs hospital stay, he started using a walker in the community, but furniture walks at home  Pt has L neck pain that is 5/10 at worst and 1/10 at its best  He also has B/L knee pain that comes on when he is moving or pivots on his knees, he noted that he needs knee replacements in both knees  Pt denies any numbness, tingling, and any dizziness                 Quality of life: good    Social Support  Steps to enter house: yes (5 MICHELLE B/L railings)  Stairs in house: yes (1 flight)   Lives in: multiple-level home  Lives with: spouse    Employment status: not working  Exercise history: none    Treatments  Previous treatment: physical therapy  Patient Goals  Patient goals for therapy: improved balance  Patient goal: To walk without an AD        Objective       Balance Test    6 Minute Walk Test (ft): Next visit   BBS 36/56   Gait Speed (ft/s): Next Visit   5x Sit To Stand (s): 16 85 seconds with BUE   TU 94 seconds with RW               Coordination Left Right   Rapid Alternating Movement Great    UE WNL WNL   LE WNL WNL       Sensation Left Right   Kinesthesia Great Toe-normal Great toe-abnormal  Ankle-normal   Light Touch normal normal       Muscle Tone Left Right   Modified Clifford Scale     Hamstring 0 0   Gastroc 0 0   Quad 0 0       Manual Muscle Testing - Hip Left Right   Flexion 5 4   Abduction 4 3     Manual Muscle Testing - Knee Left Right   Extension 5 painful in knee 5 painful in knee     Manual Muscle Testing - Ankle Left Right   Doriflexion 4 4   Plantarflexion 5 5        Transfers    Sit To Stand Independent   Sit To Supine Independent   Roll Independent         Gait Assessment: Flexed posture, decreased hip extension B/L       Short Term Goal Expiration Date:2022  Long Term Goal Expiration Date: 2023   POC Expiration Date: 2022    how did you hear about us: physician       Precautions Fall risk       Manuals                                        Neuro Re-Ed                                                                 Ther Ex                                                                        Ther Activity                        Gait Training                        Modalities

## 2022-12-05 ENCOUNTER — APPOINTMENT (OUTPATIENT)
Dept: PHYSICAL THERAPY | Facility: CLINIC | Age: 72
End: 2022-12-05

## 2022-12-05 ENCOUNTER — ANTICOAG VISIT (OUTPATIENT)
Dept: FAMILY MEDICINE CLINIC | Facility: CLINIC | Age: 72
End: 2022-12-05

## 2022-12-07 ENCOUNTER — HOSPITAL ENCOUNTER (OUTPATIENT)
Dept: INFUSION CENTER | Facility: HOSPITAL | Age: 72
Discharge: HOME | End: 2022-12-07
Attending: INTERNAL MEDICINE

## 2022-12-07 ENCOUNTER — OFFICE VISIT (OUTPATIENT)
Dept: PHYSICAL THERAPY | Facility: CLINIC | Age: 72
End: 2022-12-07

## 2022-12-07 VITALS
SYSTOLIC BLOOD PRESSURE: 141 MMHG | TEMPERATURE: 96.5 F | RESPIRATION RATE: 16 BRPM | DIASTOLIC BLOOD PRESSURE: 64 MMHG | HEART RATE: 63 BPM | OXYGEN SATURATION: 98 %

## 2022-12-07 DIAGNOSIS — D63.1 ANEMIA DUE TO STAGE 4 CHRONIC KIDNEY DISEASE (HCC): ICD-10-CM

## 2022-12-07 DIAGNOSIS — N18.4 ANEMIA DUE TO STAGE 4 CHRONIC KIDNEY DISEASE (HCC): ICD-10-CM

## 2022-12-07 DIAGNOSIS — Z91.81 AT HIGH RISK FOR INJURY RELATED TO FALL: ICD-10-CM

## 2022-12-07 DIAGNOSIS — N05.9 GLOMERULONEPHRITIS: Primary | ICD-10-CM

## 2022-12-07 DIAGNOSIS — Z98.1 S/P CERVICAL SPINAL FUSION: ICD-10-CM

## 2022-12-07 DIAGNOSIS — R26.9 GAIT ABNORMALITY: Primary | ICD-10-CM

## 2022-12-07 RX ADMIN — EPOETIN ALFA 15000 UNITS: 20000 SOLUTION INTRAVENOUS; SUBCUTANEOUS at 13:35

## 2022-12-07 NOTE — PROGRESS NOTES
Daily Note     Today's date: 2022  Patient name: Faina Salguero  : 1950  MRN: [id number]  Referring provider: Maddy Lee,*  Dx:   Encounter Diagnosis     ICD-10-CM    1  Gait abnormality  R26 9       2  S/P cervical spinal fusion  Z98 1       3  At high risk for injury related to fall  Z91 81                      Subjective: Patient reports feeling hopeful after IE that he will get better with his balance and get back to doing the things he likes      Objective: See treatment diary below      Assessment: Pt had knee pain with most activities  Ended session 15 min early due to pt fatigue and pain in knees  Gloria nto attempt step up and overs on 4" step and/or hurdles next session if patient is able  Will adapt next session if patient experiences too much knee pain  Plan: Continue per plan of care           Precautions: fall risk       Manuals                                        Neuro Re-Ed         Side stepping 2  20 ft laps solo step        Up and over airex pads 4 pad - solo step 3 laps       STS 2 airex pads on chair x 10                                       Ther Ex        treadmill Endurance training - 5 min x 1, 6 min x 1 at 1 4 -1 6 mph b/l UE solo step                                                               Ther Activity                        Gait Training        No AD 20 ft x 8 solo               Modalities

## 2022-12-07 NOTE — PROGRESS NOTES
Patient received Epogen injection SQ left arm, no complications, dressing Cd&I  Aware of next appointment, declined AVS, left unit ambulatory with steady gait

## 2022-12-09 ENCOUNTER — OFFICE VISIT (OUTPATIENT)
Dept: PHYSICAL THERAPY | Facility: CLINIC | Age: 72
End: 2022-12-09

## 2022-12-09 DIAGNOSIS — Z91.81 AT HIGH RISK FOR INJURY RELATED TO FALL: ICD-10-CM

## 2022-12-09 DIAGNOSIS — Z98.1 S/P CERVICAL SPINAL FUSION: ICD-10-CM

## 2022-12-09 DIAGNOSIS — R26.9 GAIT ABNORMALITY: Primary | ICD-10-CM

## 2022-12-09 DIAGNOSIS — M47.12 OTHER SPONDYLOSIS WITH MYELOPATHY, CERVICAL REGION: ICD-10-CM

## 2022-12-09 DIAGNOSIS — R26.89 BALANCE DISORDER: ICD-10-CM

## 2022-12-09 NOTE — PROGRESS NOTES
Daily Note     Today's date: 2022  Patient name: Monika Carlisle  : 1950  MRN: [id number]  Referring provider: Zainab Joel,*  Dx:   Encounter Diagnosis     ICD-10-CM    1  Gait abnormality  R26 9       2  S/P cervical spinal fusion  Z98 1       3  At high risk for injury related to fall  Z91 81       4  Balance disorder  R26 89       5  Other spondylosis with myelopathy, cervical region  M47 12                      Subjective: Roberto Marilee states that his knees were sore after last session, but not out of his normal  He notes that he is stiff this morning  He reports walking around [employer] yesterday  Objective: See treatment diary below      Assessment: Tian tolerated treatment fair with knee pain reported throughout session  Despite this, he was able to increase consecutive duration on treadmill today compared to last session  He needed intermittent verbal cues for foot clearance on treadmill to minimize heel scuff; however, he had a difficult time maintaining correction  Hurdles were able to be introduced with good challenge to his balance with decreased SLS stance stability with increased lateral trunk compensations  He had a few instances of LOB throughout session that he was able to independently recover from via stepping strategy  He reported feeling better than start of session  He would benefit from continued skilled PT to progress endurance, strength, and gait for increased safety and mobility  Plan: Continue per plan of care  Progress treatment as tolerated           Precautions: fall risk       Manuals                                       Neuro Re-Ed         Side stepping 2  20 ft laps solo step  Solo step 1/4 length track  x3 laps       Up and over airex pads 4 pad - solo step 3 laps       STS 2 airex pads on chair x 10 1 airex pads on chair x10 with BUE use      Hurdles   Solo step 1/4 length track  4" hurdles (5)    x3 laps fwd (step to pattern)      Bwd amb  Solo step 1/4 length track  x2 laps                      Ther Ex        treadmill Endurance training - 5 min x 1, 6 min x 1 at 1 4 -1 6 mph b/l UE solo step Endurance training--solo BUE use    1 4 mph x2 mins  1 6 mph x8 mins      V c for foot clearance to minimize audible heel scuff                                                              Ther Activity                        Gait Training        No AD 20 ft x 8 solo Solo 1/4 length track x4 laps              Modalities

## 2022-12-10 ENCOUNTER — ANTICOAG VISIT (OUTPATIENT)
Dept: FAMILY MEDICINE CLINIC | Facility: CLINIC | Age: 72
End: 2022-12-10

## 2022-12-12 ENCOUNTER — OFFICE VISIT (OUTPATIENT)
Dept: PHYSICAL THERAPY | Facility: CLINIC | Age: 72
End: 2022-12-12

## 2022-12-12 ENCOUNTER — DOCUMENTATION (OUTPATIENT)
Dept: NEPHROLOGY | Facility: HOSPITAL | Age: 72
End: 2022-12-12

## 2022-12-12 DIAGNOSIS — Z91.81 AT HIGH RISK FOR INJURY RELATED TO FALL: ICD-10-CM

## 2022-12-12 DIAGNOSIS — Z98.1 S/P CERVICAL SPINAL FUSION: ICD-10-CM

## 2022-12-12 DIAGNOSIS — M47.12 OTHER SPONDYLOSIS WITH MYELOPATHY, CERVICAL REGION: ICD-10-CM

## 2022-12-12 DIAGNOSIS — R26.9 GAIT ABNORMALITY: Primary | ICD-10-CM

## 2022-12-12 LAB
CREAT 24H UR-MCNC: 32.2 MG/DL
EXT BILIRUBIN, UA: NEGATIVE
EXT BLOOD, UA: ABNORMAL
EXT COLOR, UA: YELLOW
EXT DIFF-ABS BASOPHILS: 0.1
EXT DIFF-ABS EOSINOPHILS: 0.1
EXT DIFF-ABS LYMPHOCYTES: 1
EXT DIFF-ABS MONOCYTES: 0.4
EXT DIFF-ABS NEUTROPHILS: 70
EXT GLUCOSE BLD: 118
EXT GLUCOSE, UA: NEGATIVE
EXT KETONES: NEGATIVE
EXT PH, UA: 5
EXT PROTEIN, UA: ABNORMAL
EXT SPECIFIC GRAVITY, UA: 1.01
EXT UROBILINOGEN: 0.2
EXTERNAL ALBUMIN: 4.3
EXTERNAL ALK PHOS: 80
EXTERNAL ALT: 9
EXTERNAL AST: 15
EXTERNAL BACTERIA (UA): ABNORMAL
EXTERNAL BICARBONATE: 25
EXTERNAL BUN: 63
EXTERNAL CALCIUM: 9.7
EXTERNAL CASTS (UA): ABNORMAL
EXTERNAL CHLORIDE: 97
EXTERNAL CREATININE: 3.2
EXTERNAL HEMATOCRIT: 32 %
EXTERNAL HEMOGLOBIN: 10.8 G/DL
EXTERNAL MCV: 89
EXTERNAL PHOSPHORUS: 4.7
EXTERNAL PLATELET COUNT: 221 K/ÂΜL
EXTERNAL POTASSIUM: 4
EXTERNAL RBC (UA): ABNORMAL
EXTERNAL RBC: 3.59
EXTERNAL RDW: 12.8
EXTERNAL SODIUM: 139
EXTERNAL T.BILIRUBIN: 0.2
EXTERNAL TOTAL PROTEIN: 6.8
EXTERNAL WBC (UA): ABNORMAL
EXTERNAL WBC: 5.2
PROT SNV-MCNC: 18.5 MG/DL
PROTEIN/CREAT RATIO (HISTORICAL): 575

## 2022-12-12 NOTE — PROGRESS NOTES
Daily Note     Today's date: 2022  Patient name: Nava Lui  : 1950  MRN: [id number]  Referring provider: Macy Landers,*  Dx:   Encounter Diagnosis     ICD-10-CM    1  Gait abnormality  R26 9       2  Other spondylosis with myelopathy, cervical region  M47 12       3  S/P cervical spinal fusion  Z98 1       4  At high risk for injury related to fall  Z91 81                      Subjective: Patient reports to feeling well after last session and doing a lot of work at home and today is feeling stiff      Objective: See treatment diary below      Assessment: Attempted step up and overs today within solo step which he was able to do but struggled due to knee pain  Discussed with pt attempted to unlock knees during initial weight bearin gmoments so that he can strength extensors  Pt did not report increased pain when in that moment and was able to stabilize  Plan: Continue per plan of care          Precautions: fall risk       Manuals                                      Neuro Re-Ed         Side stepping 2  20 ft laps solo step  Solo step 1/4 length track  x3 laps  3 laps solo step     Up and over airex pads 4 pad - solo step 3 laps       STS 2 airex pads on chair x 10 1 airex pads on chair x10 with BUE use      Hurdles   Solo step 1/4 length track  4" hurdles (5)    x3 laps fwd (step to pattern) Low hurdles 4 laps fwd/lat ea solo step      Bwd amb  Solo step 1/4 length track  x2 laps      Step ups   6" x2 and 4" step 3 laps      Resisted walking   MTB - 4 laps solo      Ther Ex        treadmill Endurance training - 5 min x 1, 6 min x 1 at 1 4 -1 6 mph b/l UE solo step Endurance training--solo BUE use    1 4 mph x2 mins  1 6 mph x8 mins      V c for foot clearance to minimize audible heel scuff Endurance training--solo BUE use    1 4 mph x2 mins  1 6 mph x8 mins      V c for foot clearance to minimize audible heel scuff Ther Activity                        Gait Training        No AD 20 ft x 8 solo Solo 1/4 length track x4 laps              Modalities

## 2022-12-14 ENCOUNTER — OFFICE VISIT (OUTPATIENT)
Dept: PHYSICAL THERAPY | Facility: CLINIC | Age: 72
End: 2022-12-14

## 2022-12-14 DIAGNOSIS — Z98.1 S/P CERVICAL SPINAL FUSION: ICD-10-CM

## 2022-12-14 DIAGNOSIS — R26.89 BALANCE DISORDER: Primary | ICD-10-CM

## 2022-12-14 DIAGNOSIS — R26.9 GAIT ABNORMALITY: ICD-10-CM

## 2022-12-14 DIAGNOSIS — M47.12 OTHER SPONDYLOSIS WITH MYELOPATHY, CERVICAL REGION: ICD-10-CM

## 2022-12-16 LAB
CREAT ?TM UR-SCNC: 26.9 UMOL/L
EXT PROTEIN URINE: 21.2
PROT/CREAT UR: 788 MG/G{CREAT}

## 2022-12-19 ENCOUNTER — OFFICE VISIT (OUTPATIENT)
Dept: PHYSICAL THERAPY | Facility: CLINIC | Age: 72
End: 2022-12-19

## 2022-12-19 ENCOUNTER — DOCUMENTATION (OUTPATIENT)
Dept: NEPHROLOGY | Facility: HOSPITAL | Age: 72
End: 2022-12-19

## 2022-12-19 DIAGNOSIS — M47.12 OTHER SPONDYLOSIS WITH MYELOPATHY, CERVICAL REGION: ICD-10-CM

## 2022-12-19 DIAGNOSIS — R26.9 GAIT ABNORMALITY: ICD-10-CM

## 2022-12-19 DIAGNOSIS — R26.89 BALANCE DISORDER: Primary | ICD-10-CM

## 2022-12-19 DIAGNOSIS — Z91.81 AT HIGH RISK FOR INJURY RELATED TO FALL: ICD-10-CM

## 2022-12-19 LAB
EXT BILIRUBIN, UA: NEGATIVE
EXT BLOOD, UA: ABNORMAL
EXT COLOR, UA: YELLOW
EXT DIFF-ABS BASOPHILS: 0
EXT DIFF-ABS EOSINOPHILS: 1
EXT DIFF-ABS LYMPHOCYTES: 17
EXT DIFF-ABS MONOCYTES: 7
EXT DIFF-ABS NEUTROPHILS: 75
EXT GLUCOSE BLD: 104
EXT GLUCOSE, UA: NEGATIVE
EXT KETONES: NEGATIVE
EXT NITRITE, UA: NEGATIVE
EXT PH, UA: 5.5
EXT PROTEIN, UA: ABNORMAL
EXT SPECIFIC GRAVITY, UA: 1.01
EXT UROBILINOGEN: 0.2
EXTERNAL ALBUMIN: 4.4
EXTERNAL ALK PHOS: 93
EXTERNAL ALT: 9
EXTERNAL AST: 16
EXTERNAL BACTERIA (UA): ABNORMAL
EXTERNAL BICARBONATE: 26
EXTERNAL BUN: 44
EXTERNAL CALCIUM: 9.4
EXTERNAL CASTS (UA): ABNORMAL
EXTERNAL CHLORIDE: 97
EXTERNAL CREATININE: 2.69
EXTERNAL HEMATOCRIT: 35 %
EXTERNAL HEMOGLOBIN: 11.7 G/DL
EXTERNAL MCV: 89
EXTERNAL PHOSPHORUS: 3.9
EXTERNAL PLATELET COUNT: 245 K/ÂΜL
EXTERNAL POTASSIUM: 3.6
EXTERNAL RBC (UA): ABNORMAL
EXTERNAL RBC: 3.92
EXTERNAL RDW: 13
EXTERNAL SODIUM: 142
EXTERNAL T.BILIRUBIN: 0.3
EXTERNAL TOTAL PROTEIN: 7
EXTERNAL WBC (UA): ABNORMAL
EXTERNAL WBC: 6.7

## 2022-12-21 ENCOUNTER — OFFICE VISIT (OUTPATIENT)
Dept: PHYSICAL THERAPY | Facility: CLINIC | Age: 72
End: 2022-12-21

## 2022-12-21 DIAGNOSIS — M47.12 OTHER SPONDYLOSIS WITH MYELOPATHY, CERVICAL REGION: ICD-10-CM

## 2022-12-21 DIAGNOSIS — R26.9 GAIT ABNORMALITY: ICD-10-CM

## 2022-12-21 DIAGNOSIS — R26.89 BALANCE DISORDER: Primary | ICD-10-CM

## 2022-12-21 NOTE — PROGRESS NOTES
Progress Note     Today's date: 2022  Patient name: Fili December  : 1950  MRN: [id number]  Referring provider: Myra Hyde,*  Dx:   Encounter Diagnosis     ICD-10-CM    1  Balance disorder  R26 89       2  Other spondylosis with myelopathy, cervical region  M47 12       3  Gait abnormality  R26 9                      Subjective: Pt feels he continues to make improvements with balance, endurance and overall confidence and independnece at home and in community  He is feeling much happier and less down not that he has made physical improvements  Objective: See treatment diary below    Goals  ST  Pt will improve TUG score by 3 seconds in 4 weeks to improve his LE output and decrease his risk for falls  - met  2  Pt will improve BBS score by 7 points in 4 weeks to improve his balance and decrease the risk for falls  - met  3  Pt will be independent with HEP in 4 weeks to improve strength and functional mobility  - met    LT  Pt will ambulate with LRAD in 10 weeks to improve his QoL  2  Pt will improve BBS by 10 points in 10 weeks to improve his balance and decrease risk for falls  Balance Test     6 Minute Walk Test (ft): 1000ft RW    BBS 36/56;  45/56   Gait Speed (ft/s): 0 98 m/s    5x Sit To Stand (s): 16 85 seconds with BUE; 20 sec with B UE    TU 94 seconds with RW;  13 22 RW            Assessment: Completed progress update this session  Pt had significant improvements in balance per BBS and overall continues to progress with endurance and speed per patient report  Pt will continue to benefit from skilled therapy intervention  Plan: Continue per plan of care          Precautions: fall risk       Manuals                                    Neuro Re-Ed         Side stepping 2  20 ft laps solo step  Solo step 1/4 length track  x3 laps  3 laps solo step 3 laps solo step    Up and over airex pads 4 pad - solo step 3 laps       STS 2 airex pads on chair x 10 1 airex pads on chair x10 with BUE use  2x10    Hurdles   Solo step 1/4 length track  4" hurdles (5)    x3 laps fwd (step to pattern) Low hurdles 4 laps fwd/lat ea solo step  3 laps fwd/lat ea     Bwd amb  Solo step 1/4 length track  x2 laps  3 laps solo 3#    Step ups   6" x2 and 4" step 3 laps  6" x2 and 4" step 2 laps     Resisted walking   MTB - 4 laps solo      Semi tandem    3 laps solo                             Ther Ex        treadmill Endurance training - 5 min x 1, 6 min x 1 at 1 4 -1 6 mph b/l UE solo step Endurance training--solo BUE use    1 4 mph x2 mins  1 6 mph x8 mins      V c for foot clearance to minimize audible heel scuff Endurance training--solo BUE use    1 4 mph x2 mins  1 6 mph x8 mins      V c for foot clearance to minimize audible heel scuff Endurance and NMR - solo B UE - 1 5 mph 6 min, 7 min                                                            Ther Activity                        Gait Training        No AD 20 ft x 8 solo Solo 1/4 length track x4 laps  SPC - 4 lps solo            Modalities

## 2022-12-23 NOTE — PROGRESS NOTES
Daily Note     Today's date: 2022  Patient name: Kun Joshi  : 1950  MRN: [id number]  Referring provider: Julio César Canales,*  Dx:   Encounter Diagnosis     ICD-10-CM    1  Balance disorder  R26 89       2  Gait abnormality  R26 9       3  Other spondylosis with myelopathy, cervical region  M47 12       4  At high risk for injury related to fall  Z91 81                      Subjective: Patient reports no new changes since last session, still feels like he gets better every day  Objective: See treatment diary below      Assessment:       Plan: Continue per plan of care          Precautions: fall risk       Manuals                                    Neuro Re-Ed         Side stepping 2  20 ft laps solo step  Solo step 1/4 length track  x3 laps  3 laps solo step 3 laps solo step 3 laps solo step   Up and over airex pads 4 pad - solo step 3 laps    4 pads 3 laps fwd/lat ea solo step   STS 2 airex pads on chair x 10 1 airex pads on chair x10 with BUE use  2x10    Hurdles   Solo step 1/4 length track  4" hurdles (5)    x3 laps fwd (step to pattern) Low hurdles 4 laps fwd/lat ea solo step  3 laps fwd/lat ea  Fwd/lat 3 laps solo step ea   Bwd amb  Solo step 1/4 length track  x2 laps  3 laps solo 3# 3 laps 3#   Step ups   6" x2 and 4" step 3 laps  6" x2 and 4" step 2 laps  6" and 4" step 2 laps   Resisted walking   MTB - 4 laps solo      Semi tandem    3 laps solo                             Ther Ex        treadmill Endurance training - 5 min x 1, 6 min x 1 at 1 4 -1 6 mph b/l UE solo step Endurance training--solo BUE use    1 4 mph x2 mins  1 6 mph x8 mins      V c for foot clearance to minimize audible heel scuff Endurance training--solo BUE use    1 4 mph x2 mins  1 6 mph x8 mins      V c for foot clearance to minimize audible heel scuff Endurance and NMR - solo B UE - 1 5 mph 6 min, 7 min 10 min 1 5 mph b/l UE Ther Activity                        Gait Training        No AD 20 ft x 8 solo Solo 1/4 length track x4 laps  SPC - 4 lps solo 4 laps solo step           Modalities

## 2022-12-24 LAB
INR PPP: 2.4 (ref 0.9–1.2)
PROTHROMBIN TIME: 24.1 SEC (ref 9.1–12)

## 2022-12-27 ENCOUNTER — ANTICOAG VISIT (OUTPATIENT)
Dept: FAMILY MEDICINE CLINIC | Facility: CLINIC | Age: 72
End: 2022-12-27

## 2022-12-27 ENCOUNTER — OFFICE VISIT (OUTPATIENT)
Dept: PHYSICAL THERAPY | Facility: CLINIC | Age: 72
End: 2022-12-27

## 2022-12-27 ENCOUNTER — TELEPHONE (OUTPATIENT)
Dept: FAMILY MEDICINE CLINIC | Facility: CLINIC | Age: 72
End: 2022-12-27

## 2022-12-27 DIAGNOSIS — R26.9 GAIT ABNORMALITY: ICD-10-CM

## 2022-12-27 DIAGNOSIS — R26.89 BALANCE DISORDER: Primary | ICD-10-CM

## 2022-12-27 DIAGNOSIS — M47.12 OTHER SPONDYLOSIS WITH MYELOPATHY, CERVICAL REGION: ICD-10-CM

## 2022-12-28 ENCOUNTER — DOCUMENTATION (OUTPATIENT)
Dept: NEPHROLOGY | Facility: CLINIC | Age: 72
End: 2022-12-28

## 2022-12-28 ENCOUNTER — OFFICE VISIT (OUTPATIENT)
Dept: PHYSICAL THERAPY | Facility: CLINIC | Age: 72
End: 2022-12-28

## 2022-12-28 DIAGNOSIS — M47.12 OTHER SPONDYLOSIS WITH MYELOPATHY, CERVICAL REGION: ICD-10-CM

## 2022-12-28 DIAGNOSIS — R26.89 BALANCE DISORDER: Primary | ICD-10-CM

## 2022-12-28 DIAGNOSIS — I48.91 ATRIAL FIBRILLATION, UNSPECIFIED TYPE (HCC): ICD-10-CM

## 2022-12-28 DIAGNOSIS — I10 ESSENTIAL HYPERTENSION: ICD-10-CM

## 2022-12-28 DIAGNOSIS — Z91.81 AT HIGH RISK FOR INJURY RELATED TO FALL: ICD-10-CM

## 2022-12-28 DIAGNOSIS — N13.8 BPH WITH OBSTRUCTION/LOWER URINARY TRACT SYMPTOMS: ICD-10-CM

## 2022-12-28 DIAGNOSIS — R26.9 GAIT ABNORMALITY: ICD-10-CM

## 2022-12-28 DIAGNOSIS — Z98.1 S/P CERVICAL SPINAL FUSION: ICD-10-CM

## 2022-12-28 DIAGNOSIS — N40.1 BPH WITH OBSTRUCTION/LOWER URINARY TRACT SYMPTOMS: ICD-10-CM

## 2022-12-28 LAB
EXT BILIRUBIN, UA: NEGATIVE
EXT BLOOD, UA: ABNORMAL
EXT COLOR, UA: YELLOW
EXT DIFF-ABS BASOPHILS: 1
EXT DIFF-ABS EOSINOPHILS: 1
EXT DIFF-ABS LYMPHOCYTES: 20
EXT DIFF-ABS MONOCYTES: 7
EXT DIFF-ABS NEUTROPHILS: 71
EXT GLUCOSE BLD: 133
EXT GLUCOSE, UA: NEGATIVE
EXT KETONES: NEGATIVE
EXT NITRITE, UA: NEGATIVE
EXT PH, UA: 5.5
EXT PROTEIN, UA: ABNORMAL
EXT SPECIFIC GRAVITY, UA: 1.01
EXT UROBILINOGEN: 0.2
EXTERNAL ALBUMIN: 4.4
EXTERNAL ALK PHOS: 86
EXTERNAL ALT: 9
EXTERNAL AST: 18
EXTERNAL BICARBONATE: 28
EXTERNAL BUN: 46
EXTERNAL CALCIUM: 9.6
EXTERNAL CHLORIDE: 100
EXTERNAL CREATININE: 2.68
EXTERNAL HEMATOCRIT: 35 %
EXTERNAL HEMOGLOBIN: 11.5 G/DL
EXTERNAL MCV: 89
EXTERNAL PHOSPHORUS: 2.7
EXTERNAL PLATELET COUNT: 243 K/ÂΜL
EXTERNAL POTASSIUM: 3.6
EXTERNAL RBC (UA): 30
EXTERNAL RBC: 3.92
EXTERNAL RDW: 13
EXTERNAL SODIUM: 141
EXTERNAL T.BILIRUBIN: 0.2
EXTERNAL TOTAL PROTEIN: 6.9
EXTERNAL WBC: 6.2

## 2022-12-28 RX ORDER — TAMSULOSIN HYDROCHLORIDE 0.4 MG/1
CAPSULE ORAL
Qty: 90 CAPSULE | Refills: 1 | Status: SHIPPED | OUTPATIENT
Start: 2022-12-28

## 2022-12-28 RX ORDER — METOPROLOL SUCCINATE 100 MG/1
TABLET, EXTENDED RELEASE ORAL
Qty: 90 TABLET | Refills: 0 | Status: SHIPPED | OUTPATIENT
Start: 2022-12-28

## 2022-12-28 NOTE — PROGRESS NOTES
Daily Note     Today's date: 2022  Patient name: Sharon Cameron  : 1950  MRN: [id number]  Referring provider: Gurwinder Salter,*  Dx:   Encounter Diagnosis     ICD-10-CM    1  Balance disorder  R26 89       2  Other spondylosis with myelopathy, cervical region  M47 12       3  Gait abnormality  R26 9                      Subjective: Patient reports feeling well today, gaining strength all the time      Objective: See treatment diary below      Assessment: Patient was able to walk longer on treadmill today than he has been able to any previous sessions  He had difficulty with steps due to knee stiffness but felt better bu tthe end of the session  Able to maintain balance on foam better than any previous sessions  Plan: Continue per plan of care          Precautions: fall risk       Manuals                                    Neuro Re-Ed         Side stepping 3 laps solo step  3 laps solo step 3 laps solo step    Up and over airex pads 3 laps fwd/lat ea       STS 10x 2 with airex on chair    2x10    Hurdles  6" hurdles fwd/lat 3 laps ea solo  Low hurdles 4 laps fwd/lat ea solo step  3 laps fwd/lat ea     Bwd amb 3 laps solo step   3 laps solo 3#    Step ups 4" and 6" steps solo step up and over fwd 2 laps  6" x2 and 4" step 3 laps  6" x2 and 4" step 2 laps     Resisted walking   MTB - 4 laps solo      Semi tandem 3 laps solo step   3 laps solo                             Ther Ex        treadmill 15 min 1 8 mph solo step b UE  Endurance training--solo BUE use    1 4 mph x2 mins  1 6 mph x8 mins      V c for foot clearance to minimize audible heel scuff Endurance and NMR - solo B UE - 1 5 mph 6 min, 7 min                                                            Ther Activity                        Gait Training        No AD    SPC - 4 lps solo            Modalities

## 2022-12-28 NOTE — PROGRESS NOTES
Daily Note     Today's date: 2022  Patient name: Sergio Denney  : 1950  MRN: [id number]  Referring provider: hRea Junior,*  Dx:   Encounter Diagnosis     ICD-10-CM    1  Balance disorder  R26 89       2  Other spondylosis with myelopathy, cervical region  M47 12       3  S/P cervical spinal fusion  Z98 1       4  Gait abnormality  R26 9       5  At high risk for injury related to fall  Z91 81                      Subjective: Patient reports feeling well today, nothing new      Objective: See treatment diary below      Assessment: Tolerated session very well today  Able to complete 15 min on treamdill once again  Better tolerance for step up and overs today compared to previous  Plan: Continue per plan of care          Precautions: fall risk       Manuals                                    Neuro Re-Ed         Side stepping 3 laps solo step  3 laps solo step 3 laps solo step    Up and over airex pads 3 laps fwd/lat ea 3 laps fwd/lat ea      STS 10x 2 with airex on chair    2x10    Hurdles  6" hurdles fwd/lat 3 laps ea solo 3 laps fwd/lat ea solo step Low hurdles 4 laps fwd/lat ea solo step  3 laps fwd/lat ea     Bwd amb 3 laps solo step   3 laps solo 3#    Step ups 4" and 6" steps solo step up and over fwd 2 laps 4" x 2 ad 6 " step 2 laps fwd solo step 6" x2 and 4" step 3 laps  6" x2 and 4" step 2 laps     Resisted walking   MTB - 4 laps solo      Semi tandem 3 laps solo step   3 laps solo                             Ther Ex        treadmill 15 min 1 8 mph solo step b UE 15 min 1 8 mph solo step b ue for cardiovascular and muscular  endurance Endurance training--solo BUE use    1 4 mph x2 mins  1 6 mph x8 mins      V c for foot clearance to minimize audible heel scuff Endurance and NMR - solo B UE - 1 5 mph 6 min, 7 min                                                            Ther Activity                        Gait Training        No AD    SPC - 4 lps solo            Modalities

## 2022-12-30 LAB
CREAT ?TM UR-SCNC: 53.2 UMOL/L
EXT PROTEIN URINE: 24.9
PROT/CREAT UR: 468 MG/G{CREAT}

## 2022-12-31 LAB
ALBUMIN SNV-MCNC: 4.1 G/DL
ALP SERPL-CCNC: 83 U/L (ref 46–116)
ALT SERPL W P-5'-P-CCNC: 9 U/L (ref 12–78)
AST SERPL W P-5'-P-CCNC: 14 U/L (ref 5–45)
BILIRUB SERPL-MCNC: 0.2 MG/DL
BUN BLD-SCNC: 57 MMOL/L
BUN/CREA RATIO (HISTORICAL): 19
CALCIUM SERPL-MCNC: 9.4 MG/DL (ref 8.3–10.1)
CHARACTER SMN: CLEAR
CHLORIDE SERPL-SCNC: 101 MMOL/L (ref 96–108)
CO2 SERPL-SCNC: 25 MMOL/L
COLOR UR: YELLOW
CREAT ?TM UR-SCNC: 53.2 UMOL/L
CREAT SERPL-MCNC: 2.96 MG/DL (ref 0.6–1.3)
EXT DIFF-ABS BASOPHILS: 1
EXT DIFF-ABS EOSINOPHILS: 2
EXT DIFF-ABS LYMPHOCYTES: 23
EXT DIFF-ABS MONOCYTES: 8
EXT DIFF-ABS NEUTROPHILS: 66
EXT PROTEIN URINE: 53.2
EXTERNAL HEMATOCRIT: 34 %
EXTERNAL HEMOGLOBIN: 10.8 G/DL
EXTERNAL MCV: 90
EXTERNAL PHOSPHORUS: 3.5
EXTERNAL PLATELET COUNT: 239 K/ÂΜL
EXTERNAL RBC: 3.76
EXTERNAL RDW: 13
EXTERNAL WBC: 6.6
GASTROCULT SP2 GAST QL: ABNORMAL
GLOBULIN UR ELPH-MCNC: 2.3 MG/DL
GLUCOSE SERPL-MCNC: 147 MG/DL
IGG/ALB SER: 1.8 {RATIO} (ref 1.1–1.8)
MCH RBC BLDCO QN: 28.7 PG (ref 27–34)
MCHC. (HISTORICAL): 32
PH SMN: 5 [PH]
POTASSIUM SERPL-SCNC: 3.6 MMOL/L (ref 3.5–5.3)
PROT SERPL-MCNC: 6.4 G/DL (ref 6.4–8.2)
PROT UR STRIP-MCNC: ABNORMAL MG/DL
PROT/CREAT UR: 468 MG/G{CREAT}
RBC # BLD AUTO: ABNORMAL MILLION/UL
SODIUM SERPL-SCNC: 139 MMOL/L (ref 135–147)
SP GR UR STRIP.AUTO: 1.02 (ref 1–1.03)
UROBILINOGEN UR QL STRIP.AUTO: 0.2 E.U./DL

## 2023-01-01 NOTE — ASSESSMENT & PLAN NOTE
"SUBJECTIVE:  Subjective  Yair Torres is a 5 days female who is here for a  checkup accompanied by mother and grandmother.    HPI  Current concerns include not many bowel movements, only once in the morning for the past 2 days; today 2 bms in the morning.  Plenty of wet diapers.  Last BM    Yair's allergies, medications, history and problem list were updated as appropriate.    Review of  Issues:  Screening tests:              A. State  metabolic screen: pending              B. Hearing screen (OAE, ABR): PASS              C. Bilirubin screening: n/a              D. TSH: 14.7 T4: 1.64    There is no immunization history on file for this patient.      Review of Systems:    Nutrition:  Current diet and frequency: breast milk; q3 hours; ~15-30 minute feedings  Difficulties with feeding? No     Elimination:  Stool consistency and frequency: not hard, not soft;     Sleep: good    Development:  Follows/Regards your face?  Not yet  Turns and calms to your voice? Yes  Can suck, swallow and breathe easily? Yes         OBJECTIVE:  Vital signs  Vitals:    23 0946   Temp: 98.3 °F (36.8 °C)   TempSrc: Axillary   Weight: 2.863 kg (6 lb 5 oz)   Height: 1' 8.25" (0.514 m)   HC: 35.6 cm (14")      Change in weight since birth: 0%     Physical Exam  Constitutional:       General: She is active.      Appearance: Normal appearance. She is well-developed.   HENT:      Head: Normocephalic. Anterior fontanelle is flat.      Right Ear: Tympanic membrane normal.      Left Ear: Tympanic membrane normal.      Nose: Nose normal.      Mouth/Throat:      Mouth: Mucous membranes are moist.      Pharynx: No posterior oropharyngeal erythema.   Eyes:      Extraocular Movements: Extraocular movements intact.      Pupils: Pupils are equal, round, and reactive to light.   Cardiovascular:      Rate and Rhythm: Normal rate and regular rhythm.      Heart sounds: No murmur heard.  Pulmonary:      Effort: Pulmonary effort " Continue statin is normal. No nasal flaring or retractions.      Breath sounds: Normal breath sounds.   Abdominal:      General: Abdomen is flat. Bowel sounds are normal.      Palpations: Abdomen is soft.   Musculoskeletal:         General: Normal range of motion.      Cervical back: Normal range of motion and neck supple.   Skin:     General: Skin is warm.      Turgor: Normal.      Findings: No rash.   Neurological:      General: No focal deficit present.      Mental Status: She is alert.      Primitive Reflexes: Suck normal.        ASSESSMENT/PLAN:  Yair was seen today for well child.    Diagnoses and all orders for this visit:    Well baby, under 8 days old         Preventive Health Issues Addressed:  1. Anticipatory guidance discussed and a handout addressing  issues was provided.    2. Immunizations and screening tests today: per orders.    Follow Up:  Follow up in about 13 days (around 2023).

## 2023-01-03 ENCOUNTER — OFFICE VISIT (OUTPATIENT)
Dept: PHYSICAL THERAPY | Facility: CLINIC | Age: 73
End: 2023-01-03

## 2023-01-03 ENCOUNTER — OFFICE VISIT (OUTPATIENT)
Dept: FAMILY MEDICINE CLINIC | Facility: CLINIC | Age: 73
End: 2023-01-03

## 2023-01-03 VITALS
DIASTOLIC BLOOD PRESSURE: 64 MMHG | BODY MASS INDEX: 35.7 KG/M2 | WEIGHT: 256 LBS | OXYGEN SATURATION: 96 % | SYSTOLIC BLOOD PRESSURE: 124 MMHG | HEART RATE: 67 BPM

## 2023-01-03 DIAGNOSIS — M47.12 OTHER SPONDYLOSIS WITH MYELOPATHY, CERVICAL REGION: ICD-10-CM

## 2023-01-03 DIAGNOSIS — G47.33 OSA (OBSTRUCTIVE SLEEP APNEA): ICD-10-CM

## 2023-01-03 DIAGNOSIS — R26.2 AMBULATORY DYSFUNCTION: ICD-10-CM

## 2023-01-03 DIAGNOSIS — N18.4 ANEMIA DUE TO STAGE 4 CHRONIC KIDNEY DISEASE (HCC): ICD-10-CM

## 2023-01-03 DIAGNOSIS — D68.51 FACTOR V LEIDEN MUTATION (HCC): ICD-10-CM

## 2023-01-03 DIAGNOSIS — Z00.00 MEDICARE ANNUAL WELLNESS VISIT, SUBSEQUENT: Primary | ICD-10-CM

## 2023-01-03 DIAGNOSIS — R26.89 BALANCE DISORDER: Primary | ICD-10-CM

## 2023-01-03 DIAGNOSIS — Z91.81 AT HIGH RISK FOR INJURY RELATED TO FALL: ICD-10-CM

## 2023-01-03 DIAGNOSIS — I73.9 PERIPHERAL VASCULAR DISEASE, UNSPECIFIED (HCC): ICD-10-CM

## 2023-01-03 DIAGNOSIS — Z79.01 ANTICOAGULATED ON WARFARIN: ICD-10-CM

## 2023-01-03 DIAGNOSIS — Z12.5 ENCOUNTER FOR PROSTATE CANCER SCREENING: ICD-10-CM

## 2023-01-03 DIAGNOSIS — I50.32 CHRONIC DIASTOLIC (CONGESTIVE) HEART FAILURE (HCC): ICD-10-CM

## 2023-01-03 DIAGNOSIS — E66.01 OBESITY, MORBID (HCC): ICD-10-CM

## 2023-01-03 DIAGNOSIS — I48.0 PAROXYSMAL A-FIB (HCC): ICD-10-CM

## 2023-01-03 DIAGNOSIS — Z98.1 S/P CERVICAL SPINAL FUSION: ICD-10-CM

## 2023-01-03 DIAGNOSIS — G95.9 CERVICAL MYELOPATHY (HCC): ICD-10-CM

## 2023-01-03 DIAGNOSIS — D63.1 ANEMIA DUE TO STAGE 4 CHRONIC KIDNEY DISEASE (HCC): ICD-10-CM

## 2023-01-03 PROBLEM — N18.9 CHRONIC KIDNEY DISEASE: Status: ACTIVE | Noted: 2020-03-01

## 2023-01-03 PROBLEM — M86.18 OTHER ACUTE OSTEOMYELITIS, OTHER SITE (HCC): Status: RESOLVED | Noted: 2023-01-03 | Resolved: 2023-01-03

## 2023-01-03 PROBLEM — M86.18 OTHER ACUTE OSTEOMYELITIS, OTHER SITE (HCC): Status: ACTIVE | Noted: 2023-01-03

## 2023-01-03 NOTE — ASSESSMENT & PLAN NOTE
Slight decrease in hemoglobin below 11  No current symptoms  Will be continued to be followed closely by nephrology

## 2023-01-03 NOTE — PROGRESS NOTES
Assessment and Plan:     Medical management-continue current medication  Follow-up with multiple specialist   Add PSA to next blood work  Problem List Items Addressed This Visit        Respiratory    WILL (obstructive sleep apnea)       Cardiovascular and Mediastinum    Paroxysmal A-fib ([facility] )     Patient remains on Eliquis  No recent atrial fibrillation episodes  Chronic diastolic (congestive) heart failure (HCC)     Wt Readings from Last 3 Encounters:   01/03/23 116 kg (256 lb)   11/28/22 114 kg (252 lb)   11/22/22 115 kg (254 lb)     No current symptoms  Does follow with cardiology  Continues on torsemide  Peripheral vascular disease, unspecified ([facility] )     No current symptoms  We will continue to monitor  Nervous and Auditory    Cervical myelopathy [facility])     Being followed by neurosurgeon  Has upcoming repeat MRI scheduled  Hematopoietic and Hemostatic    Factor V Leiden mutation ([place] [address] )     Continues long-term anticoagulation with Coumadin  Other    Ambulatory dysfunction    Anticoagulated on warfarin    Obesity, morbid (HCC)    Anemia due to stage 4 chronic kidney disease (HCC)     Slight decrease in hemoglobin below 11  No current symptoms  Will be continued to be followed closely by nephrology  Other Visit Diagnoses     Medicare annual wellness visit, subsequent    -  Primary    Encounter for prostate cancer screening        Relevant Orders    PSA Total (Reflex To Free)        BMI Counseling: Body mass index is 35 7 kg/m²  The BMI is above normal  Nutrition recommendations include decreasing portion sizes and moderation in carbohydrate intake  Exercise recommendations include moderate physical activity 150 minutes/week  No pharmacotherapy was ordered  Rationale for BMI follow-up plan is due to patient being overweight or obese         Preventive health issues were discussed with patient, and age appropriate screening tests were ordered as noted in patient's After Visit Summary  Personalized health advice and appropriate referrals for health education or preventive services given if needed, as noted in patient's After Visit Summary  History of Present Illness:     Patient presents for a Medicare Wellness Visit    Patient for medical follow-up and Medicare wellness  Recent labs reviewed  Medications reviewed  1) for cardiac issues to include paroxysmal atrial fibrillation and chronic diastolic heart failure-stable  2) chronic kidney disease with anemia-followed by nephrology  3) cervical myelopathy post surgery with complication of postoperative infection  Patient continues with physical therapy and has shown continued improvement  4) obesity-continues to be an issue  Patient Care Team:  Leonor Nichols MD as PCP - General  DO Blayne Padron MD Zorita Hammer, MD Harden Nevins, DO (Nephrology)     Review of Systems:     Review of Systems   Constitutional: Negative for activity change, appetite change, diaphoresis and fatigue  HENT: Negative for congestion, sinus pressure and sore throat  Respiratory: Negative for cough, chest tightness, shortness of breath and wheezing  Cardiovascular: Negative for chest pain, palpitations and leg swelling  Fast or slow heart rate   Gastrointestinal: Negative for abdominal pain, blood in stool, constipation, diarrhea, nausea and vomiting  Genitourinary: Negative for difficulty urinating, dysuria, frequency and hematuria  Musculoskeletal: Positive for arthralgias and gait problem  Negative for joint swelling and myalgias  Neurological: Negative for dizziness, light-headedness and headaches  Psychiatric/Behavioral: Negative for agitation, confusion, dysphoric mood and sleep disturbance  The patient is not nervous/anxious           Problem List:     Patient Active Problem List   Diagnosis   • Status post catheter ablation of atrial flutter   • WILL (obstructive sleep apnea)   • Factor V Leiden mutation [facility])   • Erectile dysfunction   • Insomnia   • Lumbar herniated disc   • Primary osteoarthritis of left knee   • Primary osteoarthritis of right knee   • Paroxysmal A-fib (HCC)   • Lower extremity edema   • Mixed hyperlipidemia   • History of DVT of lower extremity   • IFG (impaired fasting glucose)   • Pes anserinus bursitis of right knee   • Cervical myelopathy ([facility])   • Sinus bradycardia   • Goals of care, counseling/discussion   • Muscle spasm   • Hyponatremia   • Head pain cephalgia   • Anemia   • Surgical site infection   • S/P cervical spinal fusion   • Great toe pain, right   • Ambulatory dysfunction   • JANEL (acute kidney injury) ([facility] )   • Other proteinuria   • Glomerulonephritis   • Urinary frequency   • Microscopic hematuria   • Chronic diastolic (congestive) heart failure ([facility])   • Hypercalcemia   • Hyperphosphatemia   • 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   • Anticoagulated on warfarin   • Chronic pain of both knees   • Other spondylosis with myelopathy, cervical region   • Balance disorder   • At high risk for injury related to fall   • Obesity, morbid ([facility] )   • Anemia due to stage 4 chronic kidney disease ([facility])   • Peripheral vascular disease, unspecified ([facility] )   • Chronic kidney disease      Past Medical and Surgical History:     Past Medical History:   Diagnosis Date   • Acute venous embolism and thrombosis of deep vessels of proximal lower extremity ([address] )     Last assessed: 5/18/15   • Arthritis    • Cellulitis     LE   • Chronic kidney disease 3/2020    Acute Kidney Injury   • CPAP (continuous positive airway pressure) dependence    • Factor V Leiden ([address] )    • Forgetfulness    • Gross hematuria 5/17/2022   • Headache(784 0) 3/2022    Never till cervical  spine surgery   • Heart murmur 3/2020    Told when in hospital   • [facility]    • Hypoalbuminemia 5/11/2022   • Other acute osteomyelitis, other site ([address] ) 1/3/2023   • Paroxysmal atrial fibrillation ([address] )     Last assessed: 11/2/15   • Sleep apnea      Past Surgical History:   Procedure Laterality Date   • BACK SURGERY      Lower   • COLON SURGERY     • COLONOSCOPY     • INCISION AND DRAINAGE POSTERIOR SPINE N/A 4/1/2022    Procedure: Posterior cervical evacuation of postoperative collection and debridement with placement of drains C3-T1; Surgeon: Romero Lang MD;  [facility];  Service: Neurosurgery   • IR BIOPSY KIDNEY RANDOM  5/26/2022   • IR TUNNELED DIALYSIS CATHETER PLACEMENT  5/23/2022   • IR TUNNELED DIALYSIS CATHETER REMOVAL  6/2/2022   • [place] ARTHRD ANT INTERBODY  [address] CRV BELOW C2 N/A 3/11/2022    Procedure: Anterior cervical discectomy and fixation fusion C5/6 and C6/7; Posterior cervical decompression and instrumented fusion C3-T1;   Surgeon: Romero Lang MD;  Location: [facility];  Service: Neurosurgery   • SPINE SURGERY  3/11/2022    Cervical myelopathy/ cervical fusion      Family History:     Family History   Problem Relation Age of Onset   • Lung cancer Mother    • Hearing loss Father    • Heart attack Brother    • Atrial fibrillation Brother    • Emphysema Sister       Social History:     Social History     Socioeconomic History   • Marital status: /Civil Union     Spouse name: Radha Mulligan   • Number of children: 4   • Years of education: None   • Highest education level: None   Occupational History   • Occupation: Retired   Tobacco Use   • Smoking status: Never   • Smokeless tobacco: Never   Vaping Use   • Vaping Use: Never used   Substance and Sexual Activity   • Alcohol use: Not Currently     Alcohol/week: 0 0 standard drinks   • Drug use: No   • Sexual activity: Not Currently     Partners: Female   Other Topics Concern   • None   Social History Narrative    Caffeine use: 2 cups coffee a day     Social Determinants of Health     Financial Resource Strain: Low Risk    • Difficulty of Paying Living Expenses: Not hard at all   Food Insecurity: No Food Insecurity   • Worried About Running Out of Food in the Last Year: Never true   • Ran Out of Food in the Last Year: Never true   Transportation Needs: No Transportation Needs   • Lack of Transportation (Medical): No   • Lack of Transportation (Non-Medical): No   Physical Activity: Not on file   Stress: Not on file   Social Connections: Not on file   Intimate Partner Violence: Not on file   Housing Stability: Low Risk    • Unable to Pay for Housing in the Last Year: No   • Number of Places Lived in the Last Year: 1   • Unstable Housing in the Last Year: No      Medications and Allergies:     Current Outpatient Medications   Medication Sig Dispense Refill   • acetaminophen (TYLENOL) 500 mg tablet Take 500 mg by mouth if needed for mild pain     • DULoxetine (CYMBALTA) 60 mg delayed release capsule Take 1 capsule (60 mg total) by mouth daily 90 capsule 1   • epoetin asha (Epogen) 2,000 units/mL Inject under the skin every 14 (fourteen) days     • flecainide (TAMBOCOR) 50 mg tablet Take 1 tablet (50 mg total) by mouth 2 (two) times a day 180 tablet 3   • gabapentin (Neurontin) 300 mg capsule Take 1 capsule (300 mg total) by mouth daily at bedtime 90 capsule 1   • methocarbamol (ROBAXIN) 750 mg tablet Take 1 tablet (750 mg total) by mouth every 6 (six) hours as needed for muscle spasms 30 tablet 0   • metoprolol succinate (TOPROL-XL) 100 mg 24 hr tablet TAKE 1 TABLET BY MOUTH  DAILY 90 tablet 0   • pravastatin (PRAVACHOL) 40 mg tablet TAKE 1 TABLET BY MOUTH  DAILY 90 tablet 1   • tamsulosin (FLOMAX) 0 4 mg TAKE 1 CAPSULE BY MOUTH  DAILY WITH DINNER 90 capsule 1   • torsemide (DEMADEX) 20 mg tablet Take 1 tablet (20 mg total) by mouth daily 90 tablet 1   • warfarin (COUMADIN) 5 mg tablet Take 2 5 mg (1/2 tablets) daily except Wednesday and Saturday take 5 mg daily   90 tablet 1   • zolpidem (AMBIEN) 10 mg tablet Take 1 tablet (10 mg total) by mouth daily at bedtime as needed for sleep 90 tablet 3   • amLODIPine (NORVASC) 5 mg tablet Take 1 tablet (5 mg total) by mouth daily 90 tablet 3   • Blood Glucose Monitoring Suppl (Accu-Chek Guide) w/Device KIT      • doxazosin (CARDURA) 1 mg tablet Take 1 tablet (1 mg total) by mouth daily at bedtime 90 tablet 1   • glucose blood (Accu-Chek Guide) test strip USE TO TEST AS DIRECTED 50 strip 2   • pantoprazole (PROTONIX) 40 mg tablet Take 1 tablet (40 mg total) by mouth daily 180 tablet 3     No current facility-administered medications for this visit  Allergies   Allergen Reactions   • Morphine GI Intolerance   • Vitamin K Other (See Comments)     On coumadin-patient sensitive to vitamin K amounts in meds etc       Immunizations:     Immunization History   Administered Date(s) Administered   • COVID-19 MODERNA VACC 0 5 ML IM 03/03/2021, 03/31/2021   • INFLUENZA 10/08/2014, 09/13/2018   • Influenza Quadrivalent Preservative Free 3 years and older IM 10/08/2015, 11/03/2016   • Influenza Split High Dose Preservative Free IM 10/30/2017   • Influenza, high dose seasonal 0 7 mL 09/13/2018, 10/24/2019, 10/27/2020, 11/04/2021   • Pneumococcal Conjugate 13-Valent 02/09/2016   • Pneumococcal Polysaccharide PPV23 04/24/2017   • Tdap 03/10/2014      Health Maintenance:         Topic Date Due   • Colorectal Cancer Screening  05/30/2023   • Hepatitis C Screening  Completed         Topic Date Due   • Hepatitis B Vaccine (1 of 3 - 3-dose series) Never done   • COVID-19 Vaccine (3 - Moderna risk series) 04/28/2021   • Influenza Vaccine (1) 09/01/2022      Medicare Screening Tests and Risk Assessments:     Yuki Robledo is here for his Subsequent Wellness visit  Last Medicare Wellness visit information reviewed, patient interviewed, no change since last AWV  Health Risk Assessment:   Patient rates overall health as poor  Patient feels that their physical health rating is slightly worse  Patient is satisfied with their life  Eyesight was rated as slightly worse  Hearing was rated as slightly worse  Patient feels that their emotional and mental health rating is slightly better  Patients states they are never, rarely angry  Patient states they are often unusually tired/fatigued  Pain experienced in the last 7 days has been some  Patient's pain rating has been 8/10  Patient states that he has experienced weight loss or gain in last 6 months  Bilateral knee pain    Fall Risk Screening: In the past year, patient has experienced: no history of falling in past year      Home Safety:  Patient has trouble with stairs inside or outside of their home  Patient has working smoke alarms Home safety hazards include: none  Nutrition:   Current diet is No Added Salt  Medications:   Patient is currently taking over-the-counter supplements  OTC medications include: see medication list  Patient is able to manage medications  Activities of Daily Living (ADLs)/Instrumental Activities of Daily Living (IADLs):   Walk and transfer into and out of bed and chair?: Yes  Dress and groom yourself?: Yes    Bathe or shower yourself?: Yes    Feed yourself? Yes  Do your laundry/housekeeping?: Yes  Manage your money, pay your bills and track your expenses?: Yes  Make your own meals?: Yes    Do your own shopping?: Yes    Previous Hospitalizations:   Any hospitalizations or ED visits within the last 12 months?: Yes    How many hospitalizations have you had in the last year?: 1-2    Hospitalization Comments: March 2022- June 2022    Advance Care Planning:   Living will: Yes    Durable POA for healthcare:  Yes    Advanced directive: Yes      Cognitive Screening:   Provider or family/friend/caregiver concerned regarding cognition?: No    PREVENTIVE SCREENINGS      Cardiovascular Screening:    General: Screening Not Indicated and History Lipid Disorder      Diabetes Screening:     General: Screening Current      Prostate Cancer Screening:      Due for: PSA      Abdominal Aortic Aneurysm (AAA) Screening:    Risk factors include: age between 73-67 yo        Lung Cancer Screening:     General: Screening Not Indicated      Hepatitis C Screening:    General: Screening Current    Screening, Brief Intervention, and Referral to Treatment (SBIRT)    Screening  Typical number of drinks in a day: 0  Typical number of drinks in a week: 0  Interpretation: Low risk drinking behavior  Single Item Drug Screening:  How often have you used an illegal drug (including marijuana) or a prescription medication for non-medical reasons in the past year? never    Single Item Drug Screen Score: 0  Interpretation: Negative screen for possible drug use disorder    Brief Intervention  Alcohol & drug use screenings were reviewed  No concerns regarding substance use disorder identified  Other Counseling Topics:   Car/seat belt/driving safety and regular weightbearing exercise  No results found  Physical Exam:     /64 (BP Location: Left arm, Patient Position: Sitting, Cuff Size: Standard)   Pulse 67   Wt 116 kg (256 lb)   SpO2 96%   BMI 35 70 kg/m²     Physical Exam  Vitals and nursing note reviewed  Constitutional:       General: He is not in acute distress  Appearance: He is obese  He is not ill-appearing  HENT:      Head: Normocephalic and atraumatic  Right Ear: Tympanic membrane, ear canal and external ear normal       Left Ear: Tympanic membrane, ear canal and external ear normal       Nose: Nose normal       Mouth/Throat:      Pharynx: No posterior oropharyngeal erythema  Eyes:      General:         Right eye: No discharge  Left eye: No discharge  Extraocular Movements: Extraocular movements intact  Conjunctiva/sclera: Conjunctivae normal       Pupils: Pupils are equal, round, and reactive to light  Neck:      Thyroid: No thyromegaly  Vascular: No carotid bruit  Trachea: No tracheal deviation  Cardiovascular:      Rate and Rhythm: Normal rate and regular rhythm        Heart sounds: Normal heart sounds  No murmur heard  Pulmonary:      Effort: Pulmonary effort is normal  No respiratory distress  Breath sounds: Normal breath sounds  No wheezing  Abdominal:      General: Bowel sounds are normal  There is no distension  Palpations: Abdomen is soft  There is no mass  Tenderness: There is no abdominal tenderness  There is no guarding  Musculoskeletal:      Cervical back: Normal range of motion and neck supple  Right lower leg: No edema  Left lower leg: No edema  Lymphadenopathy:      Cervical: No cervical adenopathy  Skin:     Findings: No rash  Neurological:      General: No focal deficit present  Mental Status: He is alert and oriented to person, place, and time  Cranial Nerves: No cranial nerve deficit        Gait: Gait abnormal       Deep Tendon Reflexes: Reflexes normal    Psychiatric:         Mood and Affect: Mood normal          Behavior: Behavior normal           Cara Chao MD

## 2023-01-03 NOTE — ASSESSMENT & PLAN NOTE
Wt Readings from Last 3 Encounters:   01/03/23 116 kg (256 lb)   11/28/22 114 kg (252 lb)   11/22/22 115 kg (254 lb)     No current symptoms  Does follow with cardiology  Continues on torsemide

## 2023-01-03 NOTE — PROGRESS NOTES
Daily Note     Today's date: 1/3/2023  Patient name: Debbie Florentino  : 1950  MRN: [id number]  Referring provider: Kati Thomas,*  Dx:   Encounter Diagnosis     ICD-10-CM    1  Balance disorder  R26 89       2  At high risk for injury related to fall  Z91 81       3  Other spondylosis with myelopathy, cervical region  M47 12       4  S/P cervical spinal fusion  Z98 1                      Subjective: Feeling really well today, feels he is getting stronger each day  Objective: See treatment diary below      Assessment: patient improved with balance this session with no instances of loss of balance going over foam forward or laterally  Pt did have increasing knee pain through session  Plan: Continue per plan of care          Precautions: fall risk       Manuals 12/27 12/28 1/3  12/21                                   Neuro Re-Ed         Side stepping 3 laps solo step  3 laps solo step     Up and over airex pads 3 laps fwd/lat ea 3 laps fwd/lat ea 3 laps fwd/lat ea     STS 10x 2 with airex on chair   10x 2     Hurdles  6" hurdles fwd/lat 3 laps ea solo 3 laps fwd/lat ea solo step 3 laps fwd/lat solo 6"     Bwd amb 3 laps solo step       Step ups 4" and 6" steps solo step up and over fwd 2 laps 4" x 2 ad 6 " step 2 laps fwd solo step      Resisted walking        Semi tandem 3 laps solo step                               Ther Ex        treadmill 15 min 1 8 mph solo step b UE 15 min 1 8 mph solo step b ue for cardiovascular and muscular  endurance 15 min 1 7 mph b/l ue for cardiovascular and muscular endurance                                                              Ther Activity                        Gait Training        No AD                Modalities

## 2023-01-03 NOTE — PATIENT INSTRUCTIONS
Medical management-continue current medication  Follow-up with multiple specialist   Add PSA to next blood work  Medicare Preventive Visit Patient Instructions  Thank you for completing your Welcome to Medicare Visit or Medicare Annual Wellness Visit today  Your next wellness visit will be due in one year (1/4/2024)  The screening/preventive services that you may require over the next 5-10 years are detailed below  Some tests may not apply to you based off risk factors and/or age  Screening tests ordered at today's visit but not completed yet may show as past due  Also, please note that scanned in results may not display below  Preventive Screenings:  Service Recommendations Previous Testing/Comments   Colorectal Cancer Screening  Colonoscopy    Fecal Occult Blood Test (FOBT)/Fecal Immunochemical Test (FIT)  Fecal DNA/Cologuard Test  Flexible Sigmoidoscopy Age: 39-70 years old   Colonoscopy: every 10 years (May be performed more frequently if at higher risk)  OR  FOBT/FIT: every 1 year  OR  Cologuard: every 3 years  OR  Sigmoidoscopy: every 5 years  Screening may be recommended earlier than age 39 if at higher risk for colorectal cancer  Also, an individualized decision between you and your healthcare provider will decide whether screening between the ages of 74-80 would be appropriate   Colonoscopy: 02/06/2012  FOBT/FIT: 05/30/2022  Cologuard: Not on file  Sigmoidoscopy: Not on file          Prostate Cancer Screening Individualized decision between patient and health care provider in men between ages of 53-78   Medicare will cover every 12 months beginning on the day after your 50th birthday PSA: No results in last 5 years           Hepatitis C Screening Once for adults born between 1945 and 1965  More frequently in patients at high risk for Hepatitis C Hep C Antibody: 09/27/2018        Diabetes Screening 1-2 times per year if you're at risk for diabetes or have pre-diabetes Fasting glucose: No results in last 5 years (No results in last 5 years)  A1C: 6 3 % (3/5/2022)      Cholesterol Screening Once every 5 years if you don't have a lipid disorder  May order more often based on risk factors  Lipid panel: 03/05/2022         Other Preventive Screenings Covered by Medicare:  Abdominal Aortic Aneurysm (AAA) Screening: covered once if your at risk  You're considered to be at risk if you have a family history of AAA or a male between the age of 73-68 who smoking at least 100 cigarettes in your lifetime  Lung Cancer Screening: covers low dose CT scan once per year if you meet all of the following conditions: (1) Age 50-69; (2) No signs or symptoms of lung cancer; (3) Current smoker or have quit smoking within the last 15 years; (4) You have a tobacco smoking history of at least 20 pack years (packs per day x number of years you smoked); (5) You get a written order from a healthcare provider  Glaucoma Screening: covered annually if you're considered high risk: (1) You have diabetes OR (2) Family history of glaucoma OR (3)  aged 48 and older OR (3)  American aged 72 and older  Osteoporosis Screening: covered every 2 years if you meet one of the following conditions: (1) Have a vertebral abnormality; (2) On glucocorticoid therapy for more than 3 months; (3) Have primary hyperparathyroidism; (4) On osteoporosis medications and need to assess response to drug therapy  HIV Screening: covered annually if you're between the age of 12-76  Also covered annually if you are younger than 13 and older than 72 with risk factors for HIV infection  For pregnant patients, it is covered up to 3 times per pregnancy      Immunizations:  Immunization Recommendations   Influenza Vaccine Annual influenza vaccination during flu season is recommended for all persons aged >= 6 months who do not have contraindications   Pneumococcal Vaccine   * Pneumococcal conjugate vaccine = PCV13 (Prevnar 13), PCV15 (Vaxneuvance), PCV20 (Prevnar 20)  * Pneumococcal polysaccharide vaccine = PPSV23 (Pneumovax) Adults 2364 years old: 1-3 doses may be recommended based on certain risk factors  Adults 72 years old: 1-2 doses may be recommended based off what pneumonia vaccine you previously received   Hepatitis B Vaccine 3 dose series if at intermediate or high risk (ex: diabetes, end stage renal disease, liver disease)   Tetanus (Td) Vaccine - COST NOT COVERED BY MEDICARE PART B Following completion of primary series, a booster dose should be given every 10 years to maintain immunity against tetanus  Td may also be given as tetanus wound prophylaxis  Tdap Vaccine - COST NOT COVERED BY MEDICARE PART B Recommended at least once for all adults  For pregnant patients, recommended with each pregnancy  Shingles Vaccine (Shingrix) - COST NOT COVERED BY MEDICARE PART B  2 shot series recommended in those aged 48 and above     Health Maintenance Due:      Topic Date Due    Colorectal Cancer Screening  05/30/2023    Hepatitis C Screening  Completed     Immunizations Due:      Topic Date Due    Hepatitis B Vaccine (1 of 3 - 3-dose series) Never done    COVID-19 Vaccine (3 - Moderna risk series) 04/28/2021    Influenza Vaccine (1) 09/01/2022     Advance Directives   What are advance directives? Advance directives are legal documents that state your wishes and plans for medical care  These plans are made ahead of time in case you lose your ability to make decisions for yourself  Advance directives can apply to any medical decision, such as the treatments you want, and if you want to donate organs  What are the types of advance directives? There are many types of advance directives, and each state has rules about how to use them  You may choose a combination of any of the following:  Living will: This is a written record of the treatment you want  You can also choose which treatments you do not want, which to limit, and which to stop at a certain time  This includes surgery, medicine, IV fluid, and tube feedings  Durable power of  for healthcare [facility]): This is a written record that states who you want to make healthcare choices for you when you are unable to make them for yourself  This person, called a proxy, is usually a family member or a friend  You may choose more than 1 proxy  Do not resuscitate (DNR) order:  A DNR order is used in case your heart stops beating or you stop breathing  It is a request not to have certain forms of treatment, such as CPR  A DNR order may be included in other types of advance directives  Medical directive: This covers the care that you want if you are in a coma, near death, or unable to make decisions for yourself  You can list the treatments you want for each condition  Treatment may include pain medicine, surgery, blood transfusions, dialysis, IV or tube feedings, and a ventilator (breathing machine)  Values history: This document has questions about your views, beliefs, and how you feel and think about life  This information can help others choose the care that you would choose  Why are advance directives important? An advance directive helps you control your care  Although spoken wishes may be used, it is better to have your wishes written down  Spoken wishes can be misunderstood, or not followed  Treatments may be given even if you do not want them  An advance directive may make it easier for your family to make difficult choices about your care  Weight Management   Why it is important to manage your weight:  Being overweight increases your risk of health conditions such as heart disease, high blood pressure, type 2 diabetes, and certain types of cancer  It can also increase your risk for osteoarthritis, sleep apnea, and other respiratory problems  Aim for a slow, steady weight loss  Even a small amount of weight loss can lower your risk of health problems    How to lose weight safely:  A safe and healthy way to lose weight is to eat fewer calories and get regular exercise  You can lose up about 1 pound a week by decreasing the number of calories you eat by 500 calories each day  Healthy meal plan for weight management:  A healthy meal plan includes a variety of foods, contains fewer calories, and helps you stay healthy  A healthy meal plan includes the following:  Eat whole-grain foods more often  A healthy meal plan should contain fiber  Fiber is the part of grains, fruits, and vegetables that is not broken down by your body  Whole-grain foods are healthy and provide extra fiber in your diet  Some examples of whole-grain foods are whole-wheat breads and pastas, oatmeal, brown rice, and bulgur  Eat a variety of vegetables every day  Include dark, leafy greens such as spinach, kale, jens greens, and mustard greens  Eat yellow and orange vegetables such as carrots, sweet potatoes, and winter squash  Eat a variety of fruits every day  Choose fresh or canned fruit (canned in its own juice or light syrup) instead of juice  Fruit juice has very little or no fiber  Eat low-fat dairy foods  Drink fat-free (skim) milk or 1% milk  Eat fat-free yogurt and low-fat cottage cheese  Try low-fat cheeses such as mozzarella and other reduced-fat cheeses  Choose meat and other protein foods that are low in fat  Choose beans or other legumes such as split peas or lentils  Choose fish, skinless poultry (chicken or turkey), or lean cuts of red meat (beef or pork)  Before you cook meat or poultry, cut off any visible fat  Use less fat and oil  Try baking foods instead of frying them  Add less fat, such as margarine, sour cream, regular salad dressing and mayonnaise to foods  Eat fewer high-fat foods  Some examples of high-fat foods include french fries, doughnuts, ice cream, and cakes  Eat fewer sweets  Limit foods and drinks that are high in sugar   This includes candy, cookies, regular soda, and sweetened drinks  Exercise:  Exercise at least 30 minutes per day on most days of the week  Some examples of exercise include walking, biking, dancing, and swimming  You can also fit in more physical activity by taking the stairs instead of the elevator or parking farther away from stores  Ask your healthcare provider about the best exercise plan for you  © Copyright [employer] 2018 Information is for End User's use only and may not be sold, redistributed or otherwise used for commercial purposes   All illustrations and images included in CareNotes® are the copyrighted property of [employer] M , Inc  or [address] [place] [address] [employer]

## 2023-01-04 ENCOUNTER — OFFICE VISIT (OUTPATIENT)
Dept: PHYSICAL THERAPY | Facility: CLINIC | Age: 73
End: 2023-01-04

## 2023-01-04 DIAGNOSIS — R26.9 GAIT ABNORMALITY: ICD-10-CM

## 2023-01-04 DIAGNOSIS — R26.89 BALANCE DISORDER: Primary | ICD-10-CM

## 2023-01-04 DIAGNOSIS — Z98.1 S/P CERVICAL SPINAL FUSION: ICD-10-CM

## 2023-01-04 DIAGNOSIS — M47.12 OTHER SPONDYLOSIS WITH MYELOPATHY, CERVICAL REGION: ICD-10-CM

## 2023-01-04 DIAGNOSIS — Z91.81 AT HIGH RISK FOR INJURY RELATED TO FALL: ICD-10-CM

## 2023-01-06 NOTE — PROGRESS NOTES
Daily Note     Today's date: 2023  Patient name: Faizan Pineda  : 1950  MRN: [id number]  Referring provider: Emily Teixeira,*  Dx:   Encounter Diagnosis     ICD-10-CM    1  Balance disorder  R26 89       2  Gait abnormality  R26 9       3  At high risk for injury related to fall  Z91 81       4  Other spondylosis with myelopathy, cervical region  M47 12       5  S/P cervical spinal fusion  Z98 1                      Subjective:       Objective: See treatment diary below      Assessment: Patient tolerating more each session, able to walk 15 min on treadmill and still complete all other activities with less fatigue at end of session  Plan to complete gait training with SPC at next session  Plan: Continue per plan of care          Precautions: fall risk       Manuals 12/27 12/28 1/3 1/4 12/21                                   Neuro Re-Ed         Side stepping 3 laps solo step  3 laps solo step 3 laps solo step    Up and over airex pads 3 laps fwd/lat ea 3 laps fwd/lat ea 3 laps fwd/lat ea 3 laps fwd/lat foam to foam     STS 10x 2 with airex on chair   10x 2 10x2    Hurdles  6" hurdles fwd/lat 3 laps ea solo 3 laps fwd/lat ea solo step 3 laps fwd/lat solo 6" 6" hurdles fwd/lat    Bwd amb 3 laps solo step   3 laps solo step    Step ups 4" and 6" steps solo step up and over fwd 2 laps 4" x 2 ad 6 " step 2 laps fwd solo step      Resisted walking        Semi tandem 3 laps solo step                               Ther Ex        treadmill 15 min 1 8 mph solo step b UE 15 min 1 8 mph solo step b ue for cardiovascular and muscular  endurance 15 min 1 7 mph b/l ue for cardiovascular and muscular endurance  15 min 1 8 mph b/l ue for cardiovascular and muscular endurance                                                             Ther Activity                        Gait Training        No AD                Modalities

## 2023-01-09 ENCOUNTER — OFFICE VISIT (OUTPATIENT)
Dept: PHYSICAL THERAPY | Facility: CLINIC | Age: 73
End: 2023-01-09

## 2023-01-09 DIAGNOSIS — Z98.1 S/P CERVICAL SPINAL FUSION: ICD-10-CM

## 2023-01-09 DIAGNOSIS — R26.89 BALANCE DISORDER: Primary | ICD-10-CM

## 2023-01-09 DIAGNOSIS — R26.9 GAIT ABNORMALITY: ICD-10-CM

## 2023-01-09 DIAGNOSIS — Z91.81 AT HIGH RISK FOR INJURY RELATED TO FALL: ICD-10-CM

## 2023-01-09 NOTE — PROGRESS NOTES
Daily Note     Today's date: 2023  Patient name: Ty Gurrola  : 1950  MRN: [id number]  Referring provider: Immanuel Garzon,*  Dx:   Encounter Diagnosis     ICD-10-CM    1  Balance disorder  R26 89       2  Gait abnormality  R26 9       3  At high risk for injury related to fall  Z91 81       4  S/P cervical spinal fusion  Z98 1                      Subjective: Patient reports continuing to feel better each day      Objective: See treatment diary below      Assessment: Patient was able to complete cone taps with good balance this session with minimal instances of knocking over cones likely indicative of improving ability to balance on one foot  Most delayed with progress due to knee pain  Plan: Continue per plan of care          Precautions: fall risk       Manuals 12/27 12/28 1/3 1/4 1/9                                   Neuro Re-Ed         Side stepping 3 laps solo step  3 laps solo step 3 laps solo step 3 laps solo step with cone taps   Up and over airex pads 3 laps fwd/lat ea 3 laps fwd/lat ea 3 laps fwd/lat ea 3 laps fwd/lat foam to foam     STS 10x 2 with airex on chair   10x 2 10x2    Hurdles  6" hurdles fwd/lat 3 laps ea solo 3 laps fwd/lat ea solo step 3 laps fwd/lat solo 6" 6" hurdles fwd/lat 6" hurdles 3 laps lateral   Bwd amb 3 laps solo step   3 laps solo step 3 laps solo step lateral   Step ups 4" and 6" steps solo step up and over fwd 2 laps 4" x 2 ad 6 " step 2 laps fwd solo step      Resisted walking        Semi tandem 3 laps solo step       Cone taps     Lateral taps with forward walking 3 laps solo step                   Ther Ex        treadmill 15 min 1 8 mph solo step b UE 15 min 1 8 mph solo step b ue for cardiovascular and muscular  endurance 15 min 1 7 mph b/l ue for cardiovascular and muscular endurance  15 min 1 8 mph b/l ue for cardiovascular and muscular endurance  15 min 1 8 mph with 3% incline b/l UE for CV and muscular endurance Ther Activity                        Gait Training        No AD                Modalities

## 2023-01-10 ENCOUNTER — ANTICOAG VISIT (OUTPATIENT)
Dept: FAMILY MEDICINE CLINIC | Facility: CLINIC | Age: 73
End: 2023-01-10

## 2023-01-10 LAB
INR PPP: 2.8 (ref 0.9–1.2)
PROTHROMBIN TIME: 27.7 SEC (ref 9.1–12)
PSA SERPL-MCNC: 1.2 NG/ML (ref 0–4)
SL AMB REFLEX CRITERIA: NORMAL

## 2023-01-11 ENCOUNTER — DOCUMENTATION (OUTPATIENT)
Dept: NEPHROLOGY | Facility: HOSPITAL | Age: 73
End: 2023-01-11

## 2023-01-11 ENCOUNTER — OFFICE VISIT (OUTPATIENT)
Dept: PHYSICAL THERAPY | Facility: CLINIC | Age: 73
End: 2023-01-11

## 2023-01-11 DIAGNOSIS — R26.89 BALANCE DISORDER: Primary | ICD-10-CM

## 2023-01-11 DIAGNOSIS — Z91.81 AT HIGH RISK FOR INJURY RELATED TO FALL: ICD-10-CM

## 2023-01-11 DIAGNOSIS — M47.12 OTHER SPONDYLOSIS WITH MYELOPATHY, CERVICAL REGION: ICD-10-CM

## 2023-01-11 DIAGNOSIS — R26.9 GAIT ABNORMALITY: ICD-10-CM

## 2023-01-11 LAB — CREAT 24H UR-MCNC: 36.9 MG/DL

## 2023-01-11 NOTE — PROGRESS NOTES
Daily Note     Today's date: 2023  Patient name: Reyes Dash  : 1950  MRN: [id number]  Referring provider: Yasmin Cody,*  Dx:   Encounter Diagnosis     ICD-10-CM    1  Balance disorder  R26 89       2  Gait abnormality  R26 9       3  Other spondylosis with myelopathy, cervical region  M47 12       4  At high risk for injury related to fall  Z91 81                      Subjective: Patient reports to physical therapy this session feeling a little bit stiff in his knees      Objective: See treatment diary below      Assessment: Patient had more difficulty with most activities this session with pain in knees and feeling a headache  Fewer activities done due to increased time required  Plan: Continue per plan of care          Precautions: fall risk       Manuals                                    Neuro Re-Ed         Side stepping 20 ft x 6 solo step   3 laps solo step 3 laps solo step with cone taps   Up and over airex pads    3 laps fwd/lat foam to foam     STS 20x   10x2    Hurdles  Fwd/lat 2 laps ea 6"   6" hurdles fwd/lat 6" hurdles 3 laps lateral   Bwd amb    3 laps solo step 3 laps solo step lateral   Step ups np       Resisted walking        Semi tandem 3 laps solo step       Cone taps     Lateral taps with forward walking 3 laps solo step                   Ther Ex        treadmill 7 min 1 6 mph 5% incline   15 min 1 8 mph b/l ue for cardiovascular and muscular endurance  15 min 1 8 mph with 3% incline b/l UE for CV and muscular endurance                                                           Ther Activity                        Gait Training        No AD                Modalities

## 2023-01-13 ENCOUNTER — TELEPHONE (OUTPATIENT)
Dept: NEPHROLOGY | Facility: HOSPITAL | Age: 73
End: 2023-01-13

## 2023-01-13 NOTE — TELEPHONE ENCOUNTER
Called and spoke to patient  Patient aware He should continue to make appointments for now  They check the lab tests prior and hold the Epogen if Hgb >11 0  If his Hgb consistently remains above this, we will cancel his injections at that time

## 2023-01-13 NOTE — TELEPHONE ENCOUNTER
----- Message from Rufina Chamberlain sent at 1/13/2023  9:52 AM EST -----  Hello,   He should continue to make appointments for now  They check the lab tests prior and hold the Epogen if Hgb >11 0  If his Hgb consistently remains above this, we will cancel his injections at that time  Please make patient aware  Thank you   ----- Message -----  From: Chava Harrison  Sent: 1/12/2023   2:39 PM EST  To: Rufina Chamberlain      ----- Message -----  From: Dina Dillon RN  Sent: 1/12/2023   2:36 PM EST  To: Nephrology [address], patient did not make any future appointments with infusion for his epogen injections  Patient states the provider told him if his hgb was over 11 he would no longer need them  Can you please clarify it patient should continue to make appointments with infusion and HOLD if above 11? Or he indeed no longer needs these appointments and the therapy plan can be discontinued    Patients labs are drawn at [address] [place] [address]

## 2023-01-16 ENCOUNTER — OFFICE VISIT (OUTPATIENT)
Dept: PHYSICAL THERAPY | Facility: CLINIC | Age: 73
End: 2023-01-16

## 2023-01-16 DIAGNOSIS — Z98.1 S/P CERVICAL SPINAL FUSION: ICD-10-CM

## 2023-01-16 DIAGNOSIS — R26.89 BALANCE DISORDER: Primary | ICD-10-CM

## 2023-01-16 DIAGNOSIS — R26.9 GAIT ABNORMALITY: ICD-10-CM

## 2023-01-16 DIAGNOSIS — M47.12 OTHER SPONDYLOSIS WITH MYELOPATHY, CERVICAL REGION: ICD-10-CM

## 2023-01-17 DIAGNOSIS — N05.9 GLOMERULONEPHRITIS: Primary | ICD-10-CM

## 2023-01-17 DIAGNOSIS — D63.1 ANEMIA DUE TO STAGE 4 CHRONIC KIDNEY DISEASE (HCC): ICD-10-CM

## 2023-01-17 DIAGNOSIS — N18.4 ANEMIA DUE TO STAGE 4 CHRONIC KIDNEY DISEASE (HCC): ICD-10-CM

## 2023-01-17 NOTE — PROGRESS NOTES
Daily Note     Today's date: 2023  Patient name: Salomon Salas  : 1950  MRN: [id number]  Referring provider: Richardson Jeong,*  Dx:   Encounter Diagnosis     ICD-10-CM    1  Balance disorder  R26 89       2  Gait abnormality  R26 9       3  Other spondylosis with myelopathy, cervical region  M47 12       4  S/P cervical spinal fusion  Z98 1                      Subjective: Patient reports feeling fine today, missed exercising nad moving more over the weekend  Objective: See treatment diary below      Assessment: Pt moving slow this session due to more knee pain with activities  He was able to control his single limb stance moment better this session compared to previous with a light tap to cone rather than a step on  Plan: Continue per plan of care          Precautions: fall risk       Manuals                                    Neuro Re-Ed         Side stepping 20 ft x 6 solo step   3 laps solo step 3 laps solo step with cone taps   Up and over airex pads    3 laps fwd/lat foam to foam     STS 20x 20x  10x2    Hurdles  Fwd/lat 2 laps ea 6"   6" hurdles fwd/lat 6" hurdles 3 laps lateral   Bwd amb  20 ft x 6x  3 laps solo step 3 laps solo step lateral   Step ups np       Resisted walking        Semi tandem 3 laps solo step       Cone taps  Forward then lateral 3 laps ea solo step    Lateral taps with forward walking 3 laps solo step                   Ther Ex        treadmill 7 min 1 6 mph 5% incline 15 min 1 6 mph 2% incline  15 min 1 8 mph b/l ue for cardiovascular and muscular endurance  15 min 1 8 mph with 3% incline b/l UE for CV and muscular endurance                                                           Ther Activity                        Gait Training        No AD                Modalities

## 2023-01-18 ENCOUNTER — OFFICE VISIT (OUTPATIENT)
Dept: PHYSICAL THERAPY | Facility: CLINIC | Age: 73
End: 2023-01-18

## 2023-01-18 ENCOUNTER — HOSPITAL ENCOUNTER (OUTPATIENT)
Dept: INFUSION CENTER | Facility: HOSPITAL | Age: 73
Discharge: HOME | End: 2023-01-18
Attending: INTERNAL MEDICINE

## 2023-01-18 VITALS
RESPIRATION RATE: 16 BRPM | DIASTOLIC BLOOD PRESSURE: 72 MMHG | TEMPERATURE: 96.9 F | OXYGEN SATURATION: 99 % | HEART RATE: 60 BPM | SYSTOLIC BLOOD PRESSURE: 148 MMHG

## 2023-01-18 DIAGNOSIS — M47.12 OTHER SPONDYLOSIS WITH MYELOPATHY, CERVICAL REGION: ICD-10-CM

## 2023-01-18 DIAGNOSIS — D63.1 ANEMIA DUE TO STAGE 4 CHRONIC KIDNEY DISEASE (HCC): ICD-10-CM

## 2023-01-18 DIAGNOSIS — R26.89 BALANCE DISORDER: Primary | ICD-10-CM

## 2023-01-18 DIAGNOSIS — Z91.81 AT HIGH RISK FOR INJURY RELATED TO FALL: ICD-10-CM

## 2023-01-18 DIAGNOSIS — R26.9 GAIT ABNORMALITY: ICD-10-CM

## 2023-01-18 DIAGNOSIS — N18.4 ANEMIA DUE TO STAGE 4 CHRONIC KIDNEY DISEASE (HCC): ICD-10-CM

## 2023-01-18 DIAGNOSIS — N05.9 GLOMERULONEPHRITIS: Primary | ICD-10-CM

## 2023-01-18 RX ADMIN — EPOETIN ALFA 15000 UNITS: 20000 SOLUTION INTRAVENOUS; SUBCUTANEOUS at 14:52

## 2023-01-18 NOTE — PROGRESS NOTES
Daily Note     Today's date: 2023  Patient name: Danette Singh  : 1950  MRN: [id number]  Referring provider: Jolie Dodd,*  Dx:   Encounter Diagnosis     ICD-10-CM    1  Balance disorder  R26 89       2  Gait abnormality  R26 9       3  At high risk for injury related to fall  Z91 81       4  Other spondylosis with myelopathy, cervical region  M47 12                      Subjective: Feeling well today, always feels better after exercising  Objective: See treatment diary below      Assessment: Therapist suggested dayne go to gym regularly and try walking on treadmill on the days that he does not come to therapy as he is now safe to get on and off and walk independently  He also asked about doing aquatics and as long as he has an option with 2 handrails or a lift to get in and out of pool therapist felt it would be benefiial aerobically  Used SPC while walking over uneven surfaces this sesion in order to get patient prepared to walk to fishing spots or to do some hiking  Plan: Continue per plan of care          Precautions: fall risk       Manuals                                    Neuro Re-Ed         Side stepping 20 ft x 6 solo step  3 laps over uneven mat with SPC - solo step 3 laps solo step 3 laps solo step with cone taps   Up and over airex pads    3 laps fwd/lat foam to foam     STS 20x  10x2 10x2    Hurdles  Fwd/lat 2 laps ea 6"  4 laps with 4 hurdles on mat - solo step fwd/lat ea 6" hurdles fwd/lat 6" hurdles 3 laps lateral   Bwd amb    3 laps solo step 3 laps solo step lateral   Step ups np       Resisted walking        Semi tandem 3 laps solo step       Cone taps  Forward then lateral 3 laps ea solo step    Lateral taps with forward walking 3 laps solo step                   Ther Ex        treadmill 7 min 1 6 mph 5% incline 15 min 1 6 mph 2% incline 15 min 1 6 mph 2% incline 15 min 1 8 mph b/l ue for cardiovascular and muscular endurance  15 min 1 8 mph with 3% incline b/l UE for CV and muscular endurance                                                           Ther Activity                        Gait Training        No AD                Modalities

## 2023-01-18 NOTE — PROGRESS NOTES
Pt tolerated Epogen injection in [facility] with no adverse reaction  Pt aware of next appt  Left unit ambulatory with steady gait  AVS printed and given to pt

## 2023-01-23 ENCOUNTER — DOCUMENTATION (OUTPATIENT)
Dept: NEPHROLOGY | Facility: HOSPITAL | Age: 73
End: 2023-01-23

## 2023-01-23 ENCOUNTER — OFFICE VISIT (OUTPATIENT)
Dept: PHYSICAL THERAPY | Facility: CLINIC | Age: 73
End: 2023-01-23

## 2023-01-23 ENCOUNTER — TELEPHONE (OUTPATIENT)
Dept: NEPHROLOGY | Facility: HOSPITAL | Age: 73
End: 2023-01-23

## 2023-01-23 DIAGNOSIS — Z91.81 AT HIGH RISK FOR INJURY RELATED TO FALL: ICD-10-CM

## 2023-01-23 DIAGNOSIS — Z98.1 S/P CERVICAL SPINAL FUSION: ICD-10-CM

## 2023-01-23 DIAGNOSIS — R26.89 BALANCE DISORDER: Primary | ICD-10-CM

## 2023-01-23 DIAGNOSIS — R26.9 GAIT ABNORMALITY: ICD-10-CM

## 2023-01-23 LAB
CREAT 24H UR-MCNC: 68.6 MG/DL
EXT BILIRUBIN, UA: NEGATIVE
EXT BLOOD, UA: ABNORMAL
EXT COLOR, UA: YELLOW
EXT DIFF-ABS BASOPHILS: 1
EXT DIFF-ABS EOSINOPHILS: 2
EXT DIFF-ABS LYMPHOCYTES: 20
EXT DIFF-ABS MONOCYTES: 7
EXT DIFF-ABS NEUTROPHILS: 69
EXT GLUCOSE BLD: 151
EXT GLUCOSE, UA: NEGATIVE
EXT KETONES: NEGATIVE
EXT NITRITE, UA: NEGATIVE
EXT PH, UA: 5.5
EXT PROTEIN, UA: ABNORMAL
EXT SPECIFIC GRAVITY, UA: 1.01
EXT UROBILINOGEN: 0.2
EXTERNAL ALBUMIN: 4.3
EXTERNAL ALK PHOS: 92
EXTERNAL ALT: 13
EXTERNAL AST: 18
EXTERNAL BACTERIA (UA): ABNORMAL
EXTERNAL BICARBONATE: 24
EXTERNAL BUN: 39
EXTERNAL CALCIUM: 9.7
EXTERNAL CASTS (UA): ABNORMAL
EXTERNAL CHLORIDE: 100
EXTERNAL CREATININE: 2.85
EXTERNAL HEMATOCRIT: 33 %
EXTERNAL HEMOGLOBIN: 10.7 G/DL
EXTERNAL MCV: 87
EXTERNAL PHOSPHORUS: 3.5
EXTERNAL PLATELET COUNT: 241 K/ÂΜL
EXTERNAL POTASSIUM: 4.2
EXTERNAL RBC (UA): ABNORMAL
EXTERNAL RBC: 3.73
EXTERNAL RDW: 13.3
EXTERNAL SODIUM: 139
EXTERNAL T.BILIRUBIN: 0.3
EXTERNAL TOTAL PROTEIN: 6.8
EXTERNAL WBC (UA): ABNORMAL
EXTERNAL WBC: 5.3
PROT SNV-MCNC: 21 MG/DL
PROTEIN/CREAT RATIO (HISTORICAL): 306

## 2023-01-23 NOTE — PROGRESS NOTES
Daily Note     Today's date: 2023  Patient name: Erica Edmondson  : 1950  MRN: [id number]  Referring provider: Socorro Patino,*  Dx:   Encounter Diagnosis     ICD-10-CM    1  Balance disorder  R26 89       2  Gait abnormality  R26 9       3  At high risk for injury related to fall  Z91 81       4  S/P cervical spinal fusion  Z98 1                      Subjective:After walkin james treadmill pt stated that he had low blood pressure this morning and had been feeling a little lightheaded which started last night  Objective: See treatment diary below    BP: 98/72    Assessment: Fcused on strengthening exercises in supine for hip and knee strength partially due to low BP  Encouraged pt to call and report to MD  Did drink 3 glasses of water as he stated he might also be dehydrated  Plan: Continue per plan of care          Precautions: fall risk       Manuals                                     Neuro Re-Ed         Side stepping 20 ft x 6 solo step  3 laps over uneven mat with SPC - solo step     Up and over airex pads        STS 20x  10x2     Hurdles  Fwd/lat 2 laps ea 6"  4 laps with 4 hurdles on mat - solo step fwd/lat ea     Bwd amb        Step ups np       Resisted walking        Semi tandem 3 laps solo step       Cone taps  Forward then lateral 3 laps ea solo step       marches    2 laps solo step            Ther Ex        treadmill 7 min 1 6 mph 5% incline 15 min 1 6 mph 2% incline  15 min 1 6 mph 2% incline    bridges    10 x 3 sec    clamshell    10x 2 ea 3# aw at knee    SLR    14x L, 12x R    SL hip abd    5x R LE, 10x L LE                            Ther Activity                        Gait Training        No AD                Modalities

## 2023-01-24 ENCOUNTER — ANTICOAG VISIT (OUTPATIENT)
Dept: FAMILY MEDICINE CLINIC | Facility: CLINIC | Age: 73
End: 2023-01-24

## 2023-01-24 NOTE — TELEPHONE ENCOUNTER
Left message with patient advising:Is the patient still having the systolic(top number) in the 100s and is he feeling dizzy or lightheaded? He may need medication adjustments  Look forward to hearing back

## 2023-01-25 ENCOUNTER — APPOINTMENT (OUTPATIENT)
Dept: PHYSICAL THERAPY | Facility: CLINIC | Age: 73
End: 2023-01-25

## 2023-01-25 NOTE — TELEPHONE ENCOUNTER
Called and spoke to patient  Will not make medication changes at this time  Patient stated he is a little lightheaded today Bp is 125/80

## 2023-01-26 ENCOUNTER — OFFICE VISIT (OUTPATIENT)
Dept: PHYSICAL THERAPY | Facility: CLINIC | Age: 73
End: 2023-01-26

## 2023-01-26 DIAGNOSIS — Z91.81 AT HIGH RISK FOR INJURY RELATED TO FALL: ICD-10-CM

## 2023-01-26 DIAGNOSIS — M47.12 OTHER SPONDYLOSIS WITH MYELOPATHY, CERVICAL REGION: ICD-10-CM

## 2023-01-26 DIAGNOSIS — R51.9 INTRACTABLE HEADACHE, UNSPECIFIED CHRONICITY PATTERN, UNSPECIFIED HEADACHE TYPE: ICD-10-CM

## 2023-01-26 DIAGNOSIS — R26.9 GAIT ABNORMALITY: ICD-10-CM

## 2023-01-26 DIAGNOSIS — R26.89 BALANCE DISORDER: Primary | ICD-10-CM

## 2023-01-26 RX ORDER — GABAPENTIN 300 MG/1
CAPSULE ORAL
Qty: 90 CAPSULE | Refills: 1 | Status: SHIPPED | OUTPATIENT
Start: 2023-01-26

## 2023-01-27 ENCOUNTER — TELEPHONE (OUTPATIENT)
Dept: NEPHROLOGY | Facility: CLINIC | Age: 73
End: 2023-01-27

## 2023-01-27 NOTE — TELEPHONE ENCOUNTER
Appointment Confirmation   Person confirmed appointment with  If not patient, name of the person Patient    Date and time of appointment 11 1/30   Patient acknowledged and will be at appointment? yes    Did you advise the patient that they will need a urine sample if they are a new patient?  N/A    Did you advise the patient to bring their current medications for verification? (including any OTC) Yes    Additional Information

## 2023-01-27 NOTE — PROGRESS NOTES
Daily Note     Today's date: 2023  Patient name: Sergio Denney  : 1950  MRN: [id number]  Referring provider: Rhea Junior,*  Dx:   Encounter Diagnosis     ICD-10-CM    1  Balance disorder  R26 89       2  Gait abnormality  R26 9       3  At high risk for injury related to fall  Z91 81       4  Other spondylosis with myelopathy, cervical region  M47 12                      Subjective: Pt reports he has been feeling much better  BP was 150/78 this mroning      Objective: See treatment diary below      Assessment: Pt continues to struggle with cone taps once he is fatigued  Plan next session to return to completing walking over uneven surfaces with varying tasks  Plan: Continue per plan of care          Precautions: fall risk       Manuals                                    Neuro Re-Ed         Side stepping 20 ft x 6 solo step  3 laps over uneven mat with SPC - solo step     Up and over airex pads        STS 20x  10x2  10x2   Hurdles  Fwd/lat 2 laps ea 6"  4 laps with 4 hurdles on mat - solo step fwd/lat ea  4 laps 8 hurdles fwd/lat ea solo step   Bwd amb        Step ups np       Resisted walking        Semi tandem 3 laps solo step       Cone taps  Forward then lateral 3 laps ea solo step    Fwd/lat 3 laps ea   marches    2 laps solo step 1 lap solo step           Ther Ex        treadmill 7 min 1 6 mph 5% incline 15 min 1 6 mph 2% incline  15 min 1 6 mph 2% incline 15 min 1 6 mph   bridges    10 x 3 sec    clamshell    10x 2 ea 3# aw at knee    SLR    14x L, 12x R    SL hip abd    5x R LE, 10x L LE                            Ther Activity                        Gait Training        No AD                Modalities

## 2023-01-28 LAB
BUN/CREA RATIO (HISTORICAL): 20
CO2 SERPL-SCNC: 24 MMOL/L
CREAT ?TM UR-SCNC: 28.2 UMOL/L
EXT BLOOD, UA: ABNORMAL
EXT COLOR, UA: YELLOW
EXT DIFF-ABS BASOPHILS: 0
EXT DIFF-ABS EOSINOPHILS: 0.1
EXT DIFF-ABS LYMPHOCYTES: 1.2
EXT DIFF-ABS MONOCYTES: 0.5
EXT DIFF-ABS NEUTROPHILS: 3.4
EXT GLUCOSE BLD: 89
EXT PH, UA: 6
EXT PROTEIN URINE: 9.8
EXT SPECIFIC GRAVITY, UA: 1.01
EXT UROBILINOGEN: 0.2
EXTERNAL ALBUMIN: 4.5
EXTERNAL ALK PHOS: 92
EXTERNAL ALT: 8
EXTERNAL AST: 15
EXTERNAL BUN: 49
EXTERNAL CALCIUM: 9.6
EXTERNAL CHLORIDE: 100
EXTERNAL CREATININE: 2.5
EXTERNAL HEMATOCRIT: 33 %
EXTERNAL HEMOGLOBIN: 10.8 G/DL
EXTERNAL MCV: 87
EXTERNAL PHOSPHORUS: 3.8
EXTERNAL PLATELET COUNT: 249 K/ÂΜL
EXTERNAL POTASSIUM: 4
EXTERNAL RBC (UA): ABNORMAL
EXTERNAL RBC: 3.78
EXTERNAL RDW: 13.7
EXTERNAL SODIUM: 138
EXTERNAL T.BILIRUBIN: 0.3
EXTERNAL TOTAL PROTEIN: 6.9
EXTERNAL WBC: 5.3
GLOBULIN UR ELPH-MCNC: 2.4 MG/DL
IGG/ALB SER: 1.9 {RATIO} (ref 1.1–1.8)
MCH RBC BLDCO QN: 28.6 PG (ref 27–34)
MCHC. (HISTORICAL): 32.9
PROT/CREAT UR: 348 MG/G{CREAT}

## 2023-01-30 ENCOUNTER — OFFICE VISIT (OUTPATIENT)
Dept: NEPHROLOGY | Facility: CLINIC | Age: 73
End: 2023-01-30

## 2023-01-30 VITALS — SYSTOLIC BLOOD PRESSURE: 136 MMHG | BODY MASS INDEX: 35.57 KG/M2 | DIASTOLIC BLOOD PRESSURE: 76 MMHG | WEIGHT: 255 LBS

## 2023-01-30 DIAGNOSIS — N02.8 IGA NEPHROPATHY DETERMINED BY BIOPSY OF KIDNEY: Primary | ICD-10-CM

## 2023-01-30 DIAGNOSIS — I50.32 CHRONIC DIASTOLIC (CONGESTIVE) HEART FAILURE (HCC): ICD-10-CM

## 2023-01-30 DIAGNOSIS — R80.8 OTHER PROTEINURIA: ICD-10-CM

## 2023-01-30 DIAGNOSIS — D63.1 ANEMIA DUE TO STAGE 4 CHRONIC KIDNEY DISEASE (HCC): ICD-10-CM

## 2023-01-30 DIAGNOSIS — R60.0 LOWER EXTREMITY EDEMA: ICD-10-CM

## 2023-01-30 DIAGNOSIS — N18.4 ANEMIA DUE TO STAGE 4 CHRONIC KIDNEY DISEASE (HCC): ICD-10-CM

## 2023-01-30 PROBLEM — E83.52 HYPERCALCEMIA: Status: RESOLVED | Noted: 2022-06-03 | Resolved: 2023-01-30

## 2023-01-30 PROBLEM — E87.1 HYPONATREMIA: Status: RESOLVED | Noted: 2022-03-21 | Resolved: 2023-01-30

## 2023-01-30 PROBLEM — E83.39 HYPERPHOSPHATEMIA: Status: RESOLVED | Noted: 2022-06-03 | Resolved: 2023-01-30

## 2023-01-30 NOTE — PATIENT INSTRUCTIONS
1  JANEL d/t IgAN, biopsy proven, in setting of recent MRSA infection  -renal biopsy performed May 26, 2022 was limited as only 6 intact glomeruli were present for evaluation on light microscopy  IgA dominant immune complex deposition noted by immunofluorescence  Differential includes IgA nephropathy both primary and secondary forms versus infectious associated glomerulonephritis  Staph aureus infections have been associated with IgA dominant immune complex deposition  Patient did have recent MRSA surgical site infection so infection associated GN should be considered   -prednisone 60 mg daily begun June 2nd, 2022  S/p prednisone taper, completed 9/1/22  -continue to bloodwork and urine testing every 2 weeks  -if this was a primary IgA nephropathy, it would be compatible with [place] classification of M0 E1 S0 T1-C1   -of 38 glomeruli, 6 were intact, forehead global glomerulosclerosis, segmental sclerosis was absent  Moderate interstitial fibrosis and tubular atrophy as well as arterial intimal fibrosis and mild arterolar hyalinosis were noted  -did not require dialysis inpatient, permacath removed prior to discharge  -sCr 2 85 as of 1/20/23, slowly improving from 5 34 and seems to have stabilized  -BUN improved to 39  -as the patient has had an acute onset of rapidly progressive glomerulonephritis with normal complement levels and deposition of mesangial IgA, 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  -DEXA scan shows normal bone density  -Off prednisone since 9/1/22  -off bactrim  -may need to consider rituxan infusion in light of podocytopathy if renal function/proteinuria does not continue to improve  Thankfully, sCr stable and proteinuria continues to improve     2  Hyponatremia, hypercalcemia - resolved     3  Hyperphosphatemia - phos 3 5 as of 1/20/23 and stable, monitor phos monthly  Will hold off on restarting Renagel 800 mg 3 times daily with meals for now    Likely d/t decreased excretion in setting of JANEL  4  Proteinuria - UpCr in setting of IgAN shows UpCr improved from 18 8g to 1 5g as of 8/15/22, with latest UpCr 306 mg/g as of 1/20/23(improved)  -consider ACEi initiation if UpCr rises above 1g      5  HTN - BP acceptable for age/CKD in office  Continue metoprolol 100 mg daily, Cardura 1 mg at bedtime, amlodipine 5 mg daily, flomax, torsemide 20mg daily     6  Anemia ? D/t underlying GN/CKD - Hgb 10 7, received blood transfusion in the past, monitor CBC, continue scheduled epogen infusions [id number] every 2 weeks, goal Hgb 10-11, at goal    -monitor CBC every 2 weeks     7  LE edema likely d/t nephrotic syndrome as well as history of chromic diastolic CHF (grade 2 diastolic dysfunction noted on April 2022 echo)  - continue torsemide 20mg daily, will monitor K/SCr every 2 weeks  Monitor daily weight/BP readings  Will continue CMP and CBC every 2 weeks along with Urinalysis and urine protein:Cr  Continue torsemide 20mg daily for edema/nephrotic syndrome  RTC in 3 months

## 2023-01-30 NOTE — PROGRESS NOTES
NEPHROLOGY OUTPATIENT PROGRESS NOTE   Jermaine Romero 71 y o  male MRN: [id number]  DATE: 1/30/2023  Reason for visit:   Chief Complaint   Patient presents with   • Follow-up   • Nephropathy        Patient Instructions   1  JANEL d/t IgAN, biopsy proven, in setting of recent MRSA infection  -renal biopsy performed May 26, 2022 was limited as only 6 intact glomeruli were present for evaluation on light microscopy  IgA dominant immune complex deposition noted by immunofluorescence  Differential includes IgA nephropathy both primary and secondary forms versus infectious associated glomerulonephritis  Staph aureus infections have been associated with IgA dominant immune complex deposition  Patient did have recent MRSA surgical site infection so infection associated GN should be considered   -prednisone 60 mg daily begun June 2nd, 2022  S/p prednisone taper, completed 9/1/22  -continue to bloodwork and urine testing every 2 weeks  -if this was a primary IgA nephropathy, it would be compatible with [place] classification of M0 E1 S0 T1-C1   -of 38 glomeruli, 6 were intact, forehead global glomerulosclerosis, segmental sclerosis was absent  Moderate interstitial fibrosis and tubular atrophy as well as arterial intimal fibrosis and mild arterolar hyalinosis were noted  -did not require dialysis inpatient, permacath removed prior to discharge  -sCr 2 85 as of 1/20/23, slowly improving from 5 34 and seems to have stabilized  -BUN improved to 39  -as the patient has had an acute onset of rapidly progressive glomerulonephritis with normal complement levels and deposition of mesangial IgA, 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  -DEXA scan shows normal bone density  -Off prednisone since 9/1/22  -off bactrim  -may need to consider rituxan infusion in light of podocytopathy if renal function/proteinuria does not continue to improve   Thankfully, sCr stable and proteinuria continues to improve     2  Hyponatremia, hypercalcemia - resolved     3  Hyperphosphatemia - phos 3 5 as of 1/20/23 and stable, monitor phos monthly  Will hold off on restarting Renagel 800 mg 3 times daily with meals for now  Likely d/t decreased excretion in setting of JANEL  4  Proteinuria - UpCr in setting of IgAN shows UpCr improved from 18 8g to 1 5g as of 8/15/22, with latest UpCr 306 mg/g as of 1/20/23(improved)  -consider ACEi initiation if UpCr rises above 1g      5  HTN - BP acceptable for age/CKD in office  Continue metoprolol 100 mg daily, Cardura 1 mg at bedtime, amlodipine 5 mg daily, flomax, torsemide 20mg daily     6  Anemia ? D/t underlying GN/CKD - Hgb 10 7, received blood transfusion in the past, monitor CBC, continue scheduled epogen infusions [id number] every 2 weeks, goal Hgb 10-11, at goal    -monitor CBC every 2 weeks     7  LE edema likely d/t nephrotic syndrome as well as history of chromic diastolic CHF (grade 2 diastolic dysfunction noted on April 2022 echo)  - continue torsemide 20mg daily, will monitor K/SCr every 2 weeks  Monitor daily weight/BP readings  Will continue CMP and CBC every 2 weeks along with Urinalysis and urine protein:Cr  Continue torsemide 20mg daily for edema/nephrotic syndrome  RTC in 3 months  Christianoeddie Becerril was seen today for follow-up and nephropathy      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  -     Comprehensive metabolic panel; Standing  -     Urinalysis with microscopic; Standing  -     Protein / creatinine ratio, urine; Standing  -     CBC and differential; Standing  -     Comprehensive metabolic panel  -     Urinalysis with microscopic  -     Protein / creatinine ratio, urine  -     CBC and differential    Chronic diastolic (congestive) heart failure (HCC)    Anemia due to stage 4 chronic kidney disease (HCC)  -     CBC and differential; Standing  -     CBC and differential    Lower extremity edema    Other proteinuria  -     Protein / creatinine ratio, urine; Standing  -     Protein / creatinine ratio, urine    Other orders  -     epoetin asha (EPOGEN,PROCRIT) injection 10,000 Units        Assessment/Plan:  1  JANEL d/t IgAN, biopsy proven, in setting of recent MRSA infection  -renal biopsy performed May 26, 2022 was limited as only 6 intact glomeruli were present for evaluation on light microscopy   IgA dominant immune complex deposition noted by immunofluorescence   Differential includes IgA nephropathy both primary and secondary forms versus infectious associated glomerulonephritis   Staph aureus infections have been associated with IgA dominant immune complex deposition   Patient did have recent MRSA surgical site infection so infection associated GN should be considered   -prednisone 60 mg daily begun June 2nd, 2022  S/p prednisone taper, completed 9/1/22  -continue to monitor weekly bloodwork and urine testing  -if this was a primary IgA nephropathy, it would be compatible with [place] classification of M0 E1 S0 T1-C1   -of 38 glomeruli, 6 were intact, forehead global glomerulosclerosis, segmental sclerosis was absent   Moderate interstitial fibrosis and tubular atrophy as well as arterial intimal fibrosis and mild arterolar hyalinosis were noted  -did not require dialysis inpatient, permacath removed prior to discharge  -sCr 2 85 as of 1/20/23, slowly improving from 5 34 and seems to have stabilized    -BUN improved to 39  -as the patient has had an acute onset of rapidly progressive glomerulonephritis with normal complement levels and deposition of mesangial IgA, 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  -DEXA scan shows normal bone density  -Off prednisone since 9/1/22  -off bactrim  -may need to consider rituxan infusion in light of podocytopathy if renal function/proteinuria does not continue to improve  Thankfully, sCr stable and proteinuria continues to improve     2  Hyponatremia, hypercalcemia - resolved     3  Hyperphosphatemia - phos 3 5 as of 1/20/23 and stable, monitor phos monthly   Will hold off on restarting Renagel 800 mg 3 times daily with meals for now  [employer] d/t decreased excretion in setting of JANEL        4  Proteinuria - UpCr in setting of IgAN shows UpCr improved from 18 8g to 1 5g as of 8/15/22, with latest UpCr 306 mg/g as of 1/20/23(improved)  -consider ACEi initiation if UpCr rises above 1g      5  HTN - BP acceptable for age/CKD in office  Continue metoprolol 100 mg daily, Cardura 1 mg at bedtime, amlodipine 5 mg daily, flomax, torsemide 20mg daily     6  Anemia ? D/t underlying GN/CKD - Hgb 10 7, received blood transfusion in the past, monitor CBC, continue scheduled epogen infusions 72322g every 2 weeks, goal Hgb 10-11, at goal    -monitor CBC every 2 weeks     7  LE edema likely d/t nephrotic syndrome as well as history of chromic diastolic CHF (grade 2 diastolic dysfunction noted on April 2022 echo)  - continue torsemide 20mg daily, will monitor K/SCr every 2 weeks  Monitor daily weight/BP readings      Will continue CMP and CBC every 2 weeks along with Urinalysis and urine protein:Cr  Continue torsemide 20mg daily for edema/nephrotic syndrome  RTC in 2 months  SUBJECTIVE / INTERVAL HISTORY:  71 y o  male presents in follow up of IgABENJAMIN Gastelum [place]ock denies any recent illness/hospitalizations/medication changes since last office visit  Denies NSAID use  Has a bit of swelling in the legs above sock line  Once he walks around his swelling worsens  Has been going to PT and doing well  Does not need walker at home but does use it when he is out  He is getting stronger  After restarting epogen his bone pain began  This occurred the first time he took epogen as well  Was recently lightheaded at PT recently  BP at PT a bit low in 100s  Review of Systems   Constitutional: Negative for chills and fever  HENT: Negative for sore throat  Eyes: Negative for visual disturbance  Respiratory: Negative for cough and shortness of breath  Cardiovascular: Positive for leg swelling (as per HPI)  Negative for chest pain  Gastrointestinal: Positive for constipation  Negative for abdominal pain, diarrhea, nausea and vomiting  Endocrine: Negative for polyuria  Genitourinary: Negative for decreased urine volume, difficulty urinating, dysuria and hematuria  Musculoskeletal: Positive for myalgias  Negative for back pain  Skin: Negative for rash  Neurological: Positive for light-headedness  Negative for dizziness and numbness  Psychiatric/Behavioral: Negative for confusion  OBJECTIVE:  /76 (BP Location: Left arm, Patient Position: Sitting, Cuff Size: Large)   Wt 116 kg (255 lb)   BMI 35 57 kg/m²  Body mass index is 35 57 kg/m²  Physical exam:  Physical Exam  Vitals reviewed  Constitutional:       General: He is not in acute distress  Appearance: Normal appearance  He is well-developed  He is obese  He is not diaphoretic  HENT:      Head: Normocephalic and atraumatic  Nose: Nose normal       Mouth/Throat:      Mouth: Mucous membranes are dry  Pharynx: No oropharyngeal exudate  Eyes:      General: No scleral icterus  Right eye: No discharge  Left eye: No discharge  Neck:      Thyroid: No thyromegaly  Cardiovascular:      Rate and Rhythm: Normal rate and regular rhythm  Heart sounds: Normal heart sounds  Pulmonary:      Effort: Pulmonary effort is normal       Breath sounds: Normal breath sounds  No wheezing or rales  Abdominal:      General: Bowel sounds are normal  There is no distension  Palpations: Abdomen is soft  Tenderness: There is no abdominal tenderness  Musculoskeletal:         General: No swelling  Normal range of motion  Cervical back: Neck supple  Lymphadenopathy:      Cervical: No cervical adenopathy  Skin:     General: Skin is warm and dry  Coloration: Skin is not pale  Findings: No rash  Neurological:      General: No focal deficit present  Mental Status: He is alert        Comments: awake   Psychiatric:         Mood and Affect: Mood normal          Behavior: Behavior normal          Medications:    Current Outpatient Medications:   •  acetaminophen (TYLENOL) 500 mg tablet, Take 500 mg by mouth if needed for mild pain, Disp: , Rfl:   •  amLODIPine (NORVASC) 5 mg tablet, Take 1 tablet (5 mg total) by mouth daily, Disp: 90 tablet, Rfl: 3  •  Blood Glucose Monitoring Suppl (Accu-Chek Guide) w/Device KIT, , Disp: , Rfl:   •  doxazosin (CARDURA) 1 mg tablet, Take 1 tablet (1 mg total) by mouth daily at bedtime, Disp: 90 tablet, Rfl: 1  •  DULoxetine (CYMBALTA) 60 mg delayed release capsule, Take 1 capsule (60 mg total) by mouth daily, Disp: 90 capsule, Rfl: 1  •  epoetin asha (Epogen) 2,000 units/mL, Inject under the skin every 14 (fourteen) days, Disp: , Rfl:   •  flecainide (TAMBOCOR) 50 mg tablet, Take 1 tablet (50 mg total) by mouth 2 (two) times a day, Disp: 180 tablet, Rfl: 3  •  gabapentin (NEURONTIN) 300 mg capsule, TAKE 1 CAPSULE BY MOUTH  DAILY AT BEDTIME, Disp: 90 capsule, Rfl: 1  •  glucose blood (Accu-Chek Guide) test strip, USE TO TEST AS DIRECTED, Disp: 50 strip, Rfl: 2  •  methocarbamol (ROBAXIN) 750 mg tablet, Take 1 tablet (750 mg total) by mouth every 6 (six) hours as needed for muscle spasms, Disp: 30 tablet, Rfl: 0  •  metoprolol succinate (TOPROL-XL) 100 mg 24 hr tablet, TAKE 1 TABLET BY MOUTH  DAILY, Disp: 90 tablet, Rfl: 0  •  pravastatin (PRAVACHOL) 40 mg tablet, TAKE 1 TABLET BY MOUTH  DAILY, Disp: 90 tablet, Rfl: 1  •  tamsulosin (FLOMAX) 0 4 mg, TAKE 1 CAPSULE BY MOUTH  DAILY WITH DINNER, Disp: 90 capsule, Rfl: 1  •  torsemide (DEMADEX) 20 mg tablet, Take 1 tablet (20 mg total) by mouth daily, Disp: 90 tablet, Rfl: 1  •  warfarin (COUMADIN) 5 mg tablet, Take 2 5 mg (1/2 tablets) daily except Wednesday and Saturday take 5 mg daily  , Disp: 90 tablet, Rfl: 1  •  zolpidem (AMBIEN) 10 mg tablet, Take 1 tablet (10 mg total) by mouth daily at bedtime as needed for sleep, Disp: 90 tablet, Rfl: 3  •  pantoprazole (PROTONIX) 40 mg tablet, Take 1 tablet (40 mg total) by mouth daily (Patient not taking: Reported on 1/30/2023), Disp: 180 tablet, Rfl: 3    Allergies: Allergies as of 01/30/2023 - Reviewed 01/30/2023   Allergen Reaction Noted   • Morphine GI Intolerance 09/11/2018   • Vitamin k Other (See Comments) 12/28/2020       The following portions of the patient's history were reviewed and updated as appropriate: past family history, past surgical history and problem list     Laboratory Results:  Lab Results   Component Value Date    SODIUM 139 01/20/2023    K 4 2 01/20/2023     01/20/2023    CO2 25 12/30/2022    BUN 39 01/20/2023    CREATININE 2 85 01/20/2023    GLUC 151 01/20/2023    CALCIUM 9 7 01/20/2023        Lab Results   Component Value Date    CALCIUM 9 7 01/20/2023    PHOS 3 5 01/20/2023       Portions of the record may have been created with voice recognition software   Occasional wrong word or "sound a like" substitutions may have occurred due to the inherent limitations of voice recognition software   Read the chart carefully and recognize, using context, where substitutions have occurred

## 2023-01-31 ENCOUNTER — OFFICE VISIT (OUTPATIENT)
Dept: PHYSICAL THERAPY | Facility: CLINIC | Age: 73
End: 2023-01-31

## 2023-01-31 ENCOUNTER — TELEPHONE (OUTPATIENT)
Dept: NEPHROLOGY | Facility: HOSPITAL | Age: 73
End: 2023-01-31

## 2023-01-31 ENCOUNTER — TELEPHONE (OUTPATIENT)
Dept: NEPHROLOGY | Facility: CLINIC | Age: 73
End: 2023-01-31

## 2023-01-31 DIAGNOSIS — Z91.81 AT HIGH RISK FOR INJURY RELATED TO FALL: ICD-10-CM

## 2023-01-31 DIAGNOSIS — R26.89 BALANCE DISORDER: Primary | ICD-10-CM

## 2023-01-31 DIAGNOSIS — R26.9 GAIT ABNORMALITY: ICD-10-CM

## 2023-01-31 NOTE — TELEPHONE ENCOUNTER
Called and spoke to patients wife  Patient aware that his sCr is improving  Last sCr 2 5 as of 1/27/23  Hemoglobin stable at 10 8  May continue reduced dose of epogen infusions (65452j each session) for now

## 2023-01-31 NOTE — TELEPHONE ENCOUNTER
----- Message from Guillermo Marin DO sent at 1/31/2023  9:34 AM EST -----  Please let the patient and his wife know that his sCr is improving  Last sCr 2 5 as of 1/27/23  Hemoglobin stable at 10 8  May continue reduced dose of epogen infusions (62136p each session) for now  Thanks

## 2023-01-31 NOTE — TELEPHONE ENCOUNTER
Patient was seen 01/30/23 and is due back in 3 months  Please advise if 05/15/23 is okay to schedule patient

## 2023-01-31 NOTE — PROGRESS NOTES
Daily Note     Today's date: 2023  Patient name: Lidya Lujan  : 1950  MRN: [id number]  Referring provider: Mike Ballard,*  Dx: No diagnosis found  Subjective: Patient reports to physical therapy this session with reports of continued improvement, feeling better energy and feeling balance and strength are improving  Objective: See treatment diary below  Balance Test     6 Minute Walk Test (ft): 1000ft RW    BBS 3656;  45   Gait Speed (ft/s): 0 98 m/s    5x Sit To Stand (s): 16 85 seconds with BUE; 20 sec with B UE    TU 94 seconds with RW;  13  RW          Assessment: Pt remains steady at similar levels as compared to last progress update regarding balance, endurance, gait speed  Did discuss importance of HEP both strengthening twice a week and walking program  Plan to switch fosue to fewer rest breaks, increased exertion and increased resistances  Limiting factor remains knee pain and instability  Goals  ST  Pt will improve TUG score by 3 seconds in 4 weeks to improve his LE output and decrease his risk for falls  - met  2  Pt will improve BBS score by 7 points in 4 weeks to improve his balance and decrease the risk for falls  - met  3  Pt will be independent with HEP in 4 weeks to improve strength and functional mobility  - met    LT  Pt will ambulate with LRAD in 10 weeks to improve his QoL  2  Pt will improve BBS by 10 points in 10 weeks to improve his balance and decrease risk for falls       Plan: Continue per plan of care          Precautions: fall risk       Manuals                                    Neuro Re-Ed         Side stepping 5 laps5# aw b/l fast as possible       Up and over airex pads        STS     10x2   Hurdles  5 laps fwd 5# aw fast as possible    4 laps 8 hurdles fwd/lat ea solo step   Bwd amb 5# aw 5 laps fast as possible       Step ups Up and over stairscase 5# aw b/l 8 laps Resisted walking        Semi tandem 4 laps solo step 5# aw       Cone taps     Fwd/lat 3 laps ea   marches 5 laps 5# aw solo step   2 laps solo step 1 lap solo step           Ther Ex        treadmill    15 min 1 6 mph 2% incline 15 min 1 6 mph   bridges    10 x 3 sec    clamshell    10x 2 ea 3# aw at knee    SLR    14x L, 12x R    SL hip abd    5x R LE, 10x L LE                            Ther Activity                        Gait Training        No AD                Modalities

## 2023-02-01 ENCOUNTER — TELEPHONE (OUTPATIENT)
Dept: NEPHROLOGY | Facility: CLINIC | Age: 73
End: 2023-02-01

## 2023-02-01 ENCOUNTER — HOSPITAL ENCOUNTER (OUTPATIENT)
Dept: INFUSION CENTER | Facility: HOSPITAL | Age: 73
Discharge: HOME | End: 2023-02-01
Attending: INTERNAL MEDICINE

## 2023-02-01 VITALS
TEMPERATURE: 97.3 F | DIASTOLIC BLOOD PRESSURE: 59 MMHG | OXYGEN SATURATION: 98 % | SYSTOLIC BLOOD PRESSURE: 115 MMHG | HEART RATE: 57 BPM | RESPIRATION RATE: 16 BRPM

## 2023-02-01 DIAGNOSIS — D63.1 ANEMIA DUE TO STAGE 4 CHRONIC KIDNEY DISEASE (HCC): ICD-10-CM

## 2023-02-01 DIAGNOSIS — N05.9 GLOMERULONEPHRITIS: Primary | ICD-10-CM

## 2023-02-01 DIAGNOSIS — R60.9 EDEMA, UNSPECIFIED TYPE: ICD-10-CM

## 2023-02-01 DIAGNOSIS — N18.4 ANEMIA DUE TO STAGE 4 CHRONIC KIDNEY DISEASE (HCC): ICD-10-CM

## 2023-02-01 DIAGNOSIS — G47.00 INSOMNIA, UNSPECIFIED TYPE: ICD-10-CM

## 2023-02-01 RX ORDER — TORSEMIDE 20 MG/1
TABLET ORAL
Qty: 90 TABLET | Refills: 1 | Status: SHIPPED | OUTPATIENT
Start: 2023-02-01

## 2023-02-01 RX ADMIN — EPOETIN ALFA 10000 UNITS: 10000 SOLUTION INTRAVENOUS; SUBCUTANEOUS at 15:03

## 2023-02-01 NOTE — PROGRESS NOTES
Pt arrived amb with walker for Epogen injection  Pt knows it is dose reduced  Spoke with DR Pito Nixon to adjust pt's orders from IV to SQ, as it has been in the past   Pt's hgb = 10 8  Hgb and BP meet parameters  Verbal order received and written from Dr Pito Nixon regarding same  Verbal order sent to  to sign  Epogen 10,000 units given SQ LEA, dsd applied  More appts amde  Pt disch amb with walker to home, steady gait

## 2023-02-01 NOTE — TELEPHONE ENCOUNTER
Received a call from Manny Rivera, RN at [address] [place] [address] called stating patient has an appointment 02/01/23 at 2:30 but the order says 10,000 intervenous  Manny Rivera stated the orders need to be changed

## 2023-02-02 DIAGNOSIS — N18.4 ANEMIA DUE TO STAGE 4 CHRONIC KIDNEY DISEASE (HCC): ICD-10-CM

## 2023-02-02 DIAGNOSIS — N05.9 GLOMERULONEPHRITIS: Primary | ICD-10-CM

## 2023-02-02 DIAGNOSIS — D63.1 ANEMIA DUE TO STAGE 4 CHRONIC KIDNEY DISEASE (HCC): ICD-10-CM

## 2023-02-02 RX ORDER — ZOLPIDEM TARTRATE 10 MG/1
10 TABLET ORAL
Qty: 90 TABLET | Refills: 0 | Status: SHIPPED | OUTPATIENT
Start: 2023-02-02

## 2023-02-02 NOTE — TELEPHONE ENCOUNTER
Spoke with Patient and schedule appointment for 05/15/23 with Dr Robert [place]fan [place] [facility]

## 2023-02-03 ENCOUNTER — OFFICE VISIT (OUTPATIENT)
Dept: PHYSICAL THERAPY | Facility: CLINIC | Age: 73
End: 2023-02-03

## 2023-02-03 DIAGNOSIS — R26.89 BALANCE DISORDER: Primary | ICD-10-CM

## 2023-02-03 DIAGNOSIS — Z91.81 AT HIGH RISK FOR INJURY RELATED TO FALL: ICD-10-CM

## 2023-02-03 DIAGNOSIS — Z98.1 S/P CERVICAL SPINAL FUSION: ICD-10-CM

## 2023-02-03 DIAGNOSIS — R26.9 GAIT ABNORMALITY: ICD-10-CM

## 2023-02-03 NOTE — PROGRESS NOTES
Daily Note     Today's date: 2/3/2023  Patient name: Renetta Keita  : 1950  MRN: [id number]  Referring provider: Dilma Linares,*  Dx:   Encounter Diagnosis     ICD-10-CM    1  Balance disorder  R26 89       2  Gait abnormality  R26 9       3  At high risk for injury related to fall  Z91 81       4  S/P cervical spinal fusion  Z98 1                      Subjective: Patient reports to physical therapy this date with r/o fatigue overall  Knee was very sore after last session, had injections tow days later  Objective: See treatment diary below      Assessment: Pt was fatigued this session with activities with increased knee pain  Completed supine strengthening this session  Plan to return to weighted walks as well as may trial resisted walked to build endurance and strength  Plan: Continue per plan of care          Precautions: fall risk       Manuals 1/31 2/3  1/23 1/26                                   Neuro Re-Ed         Side stepping 5 laps5# aw b/l fast as possible 5 laps with 10# bolster solo      Up and over airex pads        STS     10x2   Hurdles  5 laps fwd 5# aw fast as possible    4 laps 8 hurdles fwd/lat ea solo step   Bwd amb 5# aw 5 laps fast as possible 5 laps with 10# bolster      Step ups Up and over stairscase 5# aw b/l 8 laps       Resisted walking        Semi tandem 4 laps solo step 5# aw       Cone taps     Fwd/lat 3 laps ea   marches 5 laps 5# aw solo step 5 laps with 10# bolster solo  2 laps solo step 1 lap solo step           Ther Ex        treadmill  15 min 1 6 mph for CV and muscular endurance  15 min 1 6 mph 2% incline 15 min 1 6 mph   bridges  10x2  10 x 3 sec    clamshell  10x2 ea  10x 2 ea 3# aw at knee    SLR  10x ea  14x L, 12x R    SL hip abd  10x ea  5x R LE, 10x L LE                            Ther Activity                        Gait Training        No AD                Modalities

## 2023-02-06 ENCOUNTER — OFFICE VISIT (OUTPATIENT)
Dept: PHYSICAL THERAPY | Facility: CLINIC | Age: 73
End: 2023-02-06

## 2023-02-06 DIAGNOSIS — Z91.81 AT HIGH RISK FOR INJURY RELATED TO FALL: ICD-10-CM

## 2023-02-06 DIAGNOSIS — R26.9 GAIT ABNORMALITY: ICD-10-CM

## 2023-02-06 DIAGNOSIS — R26.89 BALANCE DISORDER: Primary | ICD-10-CM

## 2023-02-06 NOTE — PROGRESS NOTES
Daily Note     Today's date: 2023  Patient name: Talat Garcia  : 1950  MRN: [id number]  Referring provider: Ronel Hill,*  Dx:   Encounter Diagnosis     ICD-10-CM    1  Balance disorder  R26 89       2  Gait abnormality  R26 9       3  At high risk for injury related to fall  Z91 81                      Subjective: Patient reports not feeling well yesterday but today feels much better  Did not do exerises over the weekend due to feeling lightheaded and fatigued  Objective: See treatment diary below      Assessment: Attempted resisted ambulation to mprove strength in a functional walking pattern which pt struggled to do due to pain in his right knee  Left knee extension and hip abduction strength was bale to support leg in a single leg stance moment as well  Plan: Continue per plan of care          Precautions: fall risk       Manuals 1/31 2/3 2/6 1/23 1/26                                   Neuro Re-Ed         Side stepping 5 laps5# aw b/l fast as possible 5 laps with 10# bolster solo GTB at knees with 3# aw 3 laps solo step     Up and over airex pads        STS     10x2   Hurdles  5 laps fwd 5# aw fast as possible    4 laps 8 hurdles fwd/lat ea solo step   Bwd amb 5# aw 5 laps fast as possible 5 laps with 10# bolster In 20 degree knee flexion - 2 laps (able to maintain left kne only)     Step ups Up and over stairscase 5# aw b/l 8 laps       Resisted walking   GTB - 3 laps solo step 3# aw     Semi tandem 4 laps solo step 5# aw       Cone taps     Fwd/lat 3 laps ea   marches 5 laps 5# aw solo step 5 laps with 10# bolster solo  2 laps solo step 1 lap solo step           Ther Ex        treadmill  15 min 1 6 mph for CV and muscular endurance 10 min 1 6 mph 5% incline 15 min 1 6 mph 2% incline 15 min 1 6 mph   bridges  10x2  10 x 3 sec    clamshell  10x2 ea  10x 2 ea 3# aw at knee    SLR  10x ea  14x L, 12x R    SL hip abd  10x ea  5x R LE, 10x L LE                            Ther Activity                        Gait Training        No AD                Modalities

## 2023-02-08 ENCOUNTER — OFFICE VISIT (OUTPATIENT)
Dept: PHYSICAL THERAPY | Facility: CLINIC | Age: 73
End: 2023-02-08

## 2023-02-08 DIAGNOSIS — R26.9 GAIT ABNORMALITY: ICD-10-CM

## 2023-02-08 DIAGNOSIS — R26.89 BALANCE DISORDER: Primary | ICD-10-CM

## 2023-02-08 DIAGNOSIS — Z91.81 AT HIGH RISK FOR INJURY RELATED TO FALL: ICD-10-CM

## 2023-02-10 NOTE — PROGRESS NOTES
Daily Note     Today's date: 2/10/2023  Patient name: Bettina Diamond  : 1950  MRN: [id number]  Referring provider: Racquel Perez,*  Dx:   Encounter Diagnosis     ICD-10-CM    1  Balance disorder  R26 89       2  Gait abnormality  R26 9       3  At high risk for injury related to fall  Z91 81                      Subjective: feeling well today, was pretty sore after last session      Objective: See treatment diary below      Assessment:Patient was able to complete resisted walking today again  Therapist encouraged pt to complete supine exercise program daily which he voiced understanding  Continues to be challenged by single leg stance moment most siginficantly on R LE  Plan: Continue per plan of care          Precautions: fall risk       Manuals 1/31 2/3 2/6 2/8                                    Neuro Re-Ed         Side stepping 5 laps5# aw b/l fast as possible 5 laps with 10# bolster solo GTB at knees with 3# aw 3 laps solo step Resisted at waist 2 laps    Up and over airex pads        STS        Hurdles  5 laps fwd 5# aw fast as possible       Bwd amb 5# aw 5 laps fast as possible 5 laps with 10# bolster In 20 degree knee flexion - 2 laps (able to maintain left kne only) Resisted GTB 3 laps solo step    Step ups Up and over stairscase 5# aw b/l 8 laps       Resisted walking   GTB - 3 laps solo step 3# aw Fwd GTB 3 laps fwd    Semi tandem 4 laps solo step 5# aw       Cone taps    3 laps fwd with lat tap solo step    marches 5 laps 5# aw solo step 5 laps with 10# bolster solo  10# bolster carry 3 laps             Ther Ex        treadmill  15 min 1 6 mph for CV and muscular endurance 10 min 1 6 mph 5% incline 10 min 5% incline 1 6 mph CV and muscular endurance    bridges  10x2      clamshell  10x2 ea      SLR  10x ea      SL hip abd  10x ea                              Ther Activity                        Gait Training        No AD                Modalities

## 2023-02-13 ENCOUNTER — DOCUMENTATION (OUTPATIENT)
Dept: NEPHROLOGY | Facility: HOSPITAL | Age: 73
End: 2023-02-13

## 2023-02-13 ENCOUNTER — ANTICOAG VISIT (OUTPATIENT)
Dept: FAMILY MEDICINE CLINIC | Facility: CLINIC | Age: 73
End: 2023-02-13

## 2023-02-13 ENCOUNTER — OFFICE VISIT (OUTPATIENT)
Dept: PHYSICAL THERAPY | Facility: CLINIC | Age: 73
End: 2023-02-13

## 2023-02-13 DIAGNOSIS — R26.89 BALANCE DISORDER: Primary | ICD-10-CM

## 2023-02-13 DIAGNOSIS — Z91.81 AT HIGH RISK FOR INJURY RELATED TO FALL: ICD-10-CM

## 2023-02-13 DIAGNOSIS — R26.9 GAIT ABNORMALITY: ICD-10-CM

## 2023-02-13 DIAGNOSIS — M47.12 OTHER SPONDYLOSIS WITH MYELOPATHY, CERVICAL REGION: ICD-10-CM

## 2023-02-13 LAB
CREAT 24H UR-MCNC: 56.5 MG/DL
EXT BILIRUBIN, UA: NEGATIVE
EXT BLOOD, UA: ABNORMAL
EXT COLOR, UA: YELLOW
EXT DIFF-ABS BASOPHILS: 1
EXT DIFF-ABS EOSINOPHILS: 2
EXT DIFF-ABS LYMPHOCYTES: 25
EXT DIFF-ABS MONOCYTES: 7
EXT DIFF-ABS NEUTROPHILS: 65
EXT GLUCOSE BLD: 135
EXT GLUCOSE, UA: NEGATIVE
EXT KETONES: NEGATIVE
EXT NITRITE, UA: NEGATIVE
EXT PH, UA: 5
EXT PROTEIN, UA: NEGATIVE
EXT SPECIFIC GRAVITY, UA: 1.01
EXT UROBILINOGEN: 0.2
EXTERNAL ALBUMIN: 4.1
EXTERNAL ALK PHOS: 88
EXTERNAL ALT: 10
EXTERNAL AST: 12
EXTERNAL BACTERIA (UA): ABNORMAL
EXTERNAL BICARBONATE: 23
EXTERNAL BUN: 47
EXTERNAL CALCIUM: 9.4
EXTERNAL CASTS (UA): ABNORMAL
EXTERNAL CHLORIDE: 102
EXTERNAL CREATININE: 2.91
EXTERNAL HEMATOCRIT: 33 %
EXTERNAL HEMOGLOBIN: 10.9 G/DL
EXTERNAL MCV: 87
EXTERNAL PHOSPHORUS: 4.1
EXTERNAL PLATELET COUNT: 239 K/ÂΜL
EXTERNAL POTASSIUM: 4
EXTERNAL RBC (UA): ABNORMAL
EXTERNAL RBC: 3.74
EXTERNAL RDW: 14.4
EXTERNAL SODIUM: 141
EXTERNAL T.BILIRUBIN: 0.4
EXTERNAL TOTAL PROTEIN: 6.9
EXTERNAL WBC (UA): ABNORMAL
EXTERNAL WBC: 5.5
PROT SNV-MCNC: 14.5 MG/DL
PROTEIN/CREAT RATIO (HISTORICAL): 257

## 2023-02-14 NOTE — PROGRESS NOTES
Daily Note     Today's date: 2023  Patient name: Gege Villaseñor  : 1950  MRN: [id number]  Referring provider: Joi Pace,*  Dx:   Encounter Diagnosis     ICD-10-CM    1  Balance disorder  R26 89       2  Gait abnormality  R26 9       3  At high risk for injury related to fall  Z91 81       4  Other spondylosis with myelopathy, cervical region  M47 12                      Subjective: Patient reports to physical therapy with reports of decreased LE strength    Objective: See treatment diary below      Assessment: Patient was able to maintain balance well with unevn surfaces but struggled most with lateral hurdles with a few instances of loss of balance into solo step  Plan: Continue per plan of care          Precautions: fall risk       Manuals 1/31 2/3 2/6 2/8 2/13                                   Neuro Re-Ed         Side stepping 5 laps5# aw b/l fast as possible 5 laps with 10# bolster solo GTB at knees with 3# aw 3 laps solo step Resisted at waist 2 laps Uneven mat - 4 laps solo step   Up and over airex pads        STS        Hurdles  5 laps fwd 5# aw fast as possible    Uneven mat - 4 laps fwd, 4 laps lat   Bwd amb 5# aw 5 laps fast as possible 5 laps with 10# bolster In 20 degree knee flexion - 2 laps (able to maintain left kne only) Resisted GTB 3 laps solo step Uneven mat 4 laps   Step ups Up and over stairscase 5# aw b/l 8 laps       Resisted walking   GTB - 3 laps solo step 3# aw Fwd GTB 3 laps fwd    Semi tandem 4 laps solo step 5# aw       Cone taps    3 laps fwd with lat tap solo step    marches 5 laps 5# aw solo step 5 laps with 10# bolster solo  10# bolster carry 3 laps             Ther Ex        treadmill  15 min 1 6 mph for CV and muscular endurance 10 min 1 6 mph 5% incline 10 min 5% incline 1 6 mph CV and muscular endurance 10 min 5% incline 1 6 mph    bridges  10x2      clamshell  10x2 ea      SLR  10x ea      SL hip abd  10x ea                              Ther Activity                        Gait Training        No AD                Modalities

## 2023-02-15 ENCOUNTER — HOSPITAL ENCOUNTER (OUTPATIENT)
Dept: INFUSION CENTER | Facility: HOSPITAL | Age: 73
Discharge: HOME | End: 2023-02-15
Attending: INTERNAL MEDICINE

## 2023-02-15 VITALS
TEMPERATURE: 96.9 F | HEART RATE: 54 BPM | RESPIRATION RATE: 16 BRPM | SYSTOLIC BLOOD PRESSURE: 150 MMHG | DIASTOLIC BLOOD PRESSURE: 67 MMHG | OXYGEN SATURATION: 98 %

## 2023-02-15 DIAGNOSIS — D63.1 ANEMIA DUE TO STAGE 4 CHRONIC KIDNEY DISEASE (HCC): ICD-10-CM

## 2023-02-15 DIAGNOSIS — N18.4 ANEMIA DUE TO STAGE 4 CHRONIC KIDNEY DISEASE (HCC): ICD-10-CM

## 2023-02-15 DIAGNOSIS — N05.9 GLOMERULONEPHRITIS: Primary | ICD-10-CM

## 2023-02-15 RX ADMIN — EPOETIN ALFA 10000 UNITS: 10000 SOLUTION INTRAVENOUS; SUBCUTANEOUS at 14:16

## 2023-02-15 NOTE — PROGRESS NOTES
Epogen injection received into R arm, band aid applied  Pt aware of next appt  Left ambulatory with steady gait for d/c

## 2023-02-17 ENCOUNTER — OFFICE VISIT (OUTPATIENT)
Dept: PHYSICAL THERAPY | Facility: CLINIC | Age: 73
End: 2023-02-17

## 2023-02-17 DIAGNOSIS — Z91.81 AT HIGH RISK FOR INJURY RELATED TO FALL: ICD-10-CM

## 2023-02-17 DIAGNOSIS — R26.9 GAIT ABNORMALITY: ICD-10-CM

## 2023-02-17 DIAGNOSIS — R26.89 BALANCE DISORDER: Primary | ICD-10-CM

## 2023-02-17 NOTE — PROGRESS NOTES
Daily Note     Today's date: 2023  Patient name: Gege Villaseñor  : 1950  MRN: [id number]  Referring provider: Joi Pace,*  Dx:   Encounter Diagnosis     ICD-10-CM    1  Balance disorder  R26 89       2  Gait abnormality  R26 9       3  At high risk for injury related to fall  Z91 81                      Subjective: Pt reports feeling a little sore from injections on Wednesday  Did exercises a couple of days ago  Objective: See treatment diary below      Assessment: Significant time spent discussion which hand to use SPC as well as which foot to lead with when going up and down stairs  Pt typically does the reverse form recommended  Plan to continue to practice at next session  Plan: Continue per plan of care          Precautions: fall risk       Manuals                                    Neuro Re-Ed         Side stepping 5 laps PTB at knees  GTB at knees with 3# aw 3 laps solo step Resisted at waist 2 laps Uneven mat - 4 laps solo step   Up and over airex pads        STS 10x2       Hurdles      Uneven mat - 4 laps fwd, 4 laps lat   Bwd amb   In 20 degree knee flexion - 2 laps (able to maintain left kne only) Resisted GTB 3 laps solo step Uneven mat 4 laps   Step ups        Resisted walking   GTB - 3 laps solo step 3# aw Fwd GTB 3 laps fwd    Semi tandem        Cone taps    3 laps fwd with lat tap solo step    marches 2 5# aw 4 laps   10# bolster carry 3 laps     obstacle 6" step, airex pads, hurdles, black pads        Ther Ex        treadmill 15 min 1 8 mph solo step  10 min 1 6 mph 5% incline 10 min 5% incline 1 6 mph CV and muscular endurance 10 min 5% incline 1 6 mph    bridges        clamshell        SLR        SL hip abd                                Ther Activity                        Gait Training        No AD                Modalities

## 2023-02-20 ENCOUNTER — APPOINTMENT (OUTPATIENT)
Dept: PHYSICAL THERAPY | Facility: CLINIC | Age: 73
End: 2023-02-20

## 2023-02-21 ENCOUNTER — OFFICE VISIT (OUTPATIENT)
Dept: PHYSICAL THERAPY | Facility: CLINIC | Age: 73
End: 2023-02-21

## 2023-02-21 DIAGNOSIS — R26.89 BALANCE DISORDER: Primary | ICD-10-CM

## 2023-02-21 DIAGNOSIS — R26.9 GAIT ABNORMALITY: ICD-10-CM

## 2023-02-21 DIAGNOSIS — Z98.1 S/P CERVICAL SPINAL FUSION: ICD-10-CM

## 2023-02-21 DIAGNOSIS — Z91.81 AT HIGH RISK FOR INJURY RELATED TO FALL: ICD-10-CM

## 2023-02-21 NOTE — PROGRESS NOTES
Daily Note     Today's date: 2023  Patient name: Brunilda Blancas  : 1950  MRN: [id number]  Referring provider: Jeff Allred,*  Dx:   Encounter Diagnosis     ICD-10-CM    1  Balance disorder  R26 89       2  Gait abnormality  R26 9       3  At high risk for injury related to fall  Z91 81       4  S/P cervical spinal fusion  Z98 1                      Subjective: patient reports feeling well today  He had a very bad weekend and spent much of it lying down and was very busy due to a poor reaction to his injections last week  Objective: See treatment diary below      Assessment: Patient had significant knee pain with latera lstepping over uneven surface but no increase in knee pain  Completed resisted side steping which did increase pain but was able to tolerate in order to increase hip abduction strength  NO instances of fall sinto solo step wit huneven surfaces with hurdles  Plan: Continue per plan of care          Precautions: fall risk       Manuals                                    Neuro Re-Ed         Side stepping 5 laps PTB at knees 5 laps btb at knees solo step GTB at knees with 3# aw 3 laps solo step Resisted at waist 2 laps Uneven mat - 4 laps solo step   Up and over airex pads        STS 10x2 10x2      Hurdles   On uneven mat - 2 hurdlses x 10   Uneven mat - 4 laps fwd, 4 laps lat   Bwd amb   In 20 degree knee flexion - 2 laps (able to maintain left kne only) Resisted GTB 3 laps solo step Uneven mat 4 laps   Step ups        Resisted walking   GTB - 3 laps solo step 3# aw Fwd GTB 3 laps fwd    Semi tandem        Cone taps    3 laps fwd with lat tap solo step    marches 2 5# aw 4 laps 3# aw 4 laps   10# bolster carry 3 laps     obstacle 6" step, airex pads, hurdles, black pads        Ther Ex        treadmill 15 min 1 8 mph solo step 15 min 1 8 mph solo step end of session 10 min 1 6 mph 5% incline 10 min 5% incline 1 6 mph CV and muscular endurance 10 min 5% incline 1 6 mph    [facility]        SLR        SL hip abd                                Ther Activity                        Gait Training        No AD        uneve surfaces  10 laps uneven mat solo step      Modalities

## 2023-02-24 ENCOUNTER — TELEPHONE (OUTPATIENT)
Dept: NEPHROLOGY | Facility: CLINIC | Age: 73
End: 2023-02-24

## 2023-02-24 ENCOUNTER — APPOINTMENT (OUTPATIENT)
Dept: PHYSICAL THERAPY | Facility: CLINIC | Age: 73
End: 2023-02-24

## 2023-02-24 NOTE — TELEPHONE ENCOUNTER
Received a call from patient on the voicemail and called patient back  Patient told me that he and his wife have covid  Patient would like to know with his condition what he should and shouldn't take  Patient told me he does not have severe symptoms  Please advise

## 2023-02-27 ENCOUNTER — APPOINTMENT (OUTPATIENT)
Dept: PHYSICAL THERAPY | Facility: CLINIC | Age: 73
End: 2023-02-27

## 2023-02-27 NOTE — TELEPHONE ENCOUNTER
Called and spoke to patient  He may take over the counter tylenol, and should avoid medications containing NSAIDs like ibuprofen  He should also avoid medications containing pseudoephedrine as this can raise blood pressure  Please ask him to contact his PCP for potential therapies for COVID  See my chart note

## 2023-03-01 ENCOUNTER — HOSPITAL ENCOUNTER (OUTPATIENT)
Dept: INFUSION CENTER | Facility: HOSPITAL | Age: 73
Discharge: HOME | End: 2023-03-01
Attending: INTERNAL MEDICINE

## 2023-03-03 ENCOUNTER — APPOINTMENT (OUTPATIENT)
Dept: PHYSICAL THERAPY | Facility: CLINIC | Age: 73
End: 2023-03-03

## 2023-03-06 ENCOUNTER — APPOINTMENT (OUTPATIENT)
Dept: PHYSICAL THERAPY | Facility: CLINIC | Age: 73
End: 2023-03-06

## 2023-03-06 ENCOUNTER — ANTICOAG VISIT (OUTPATIENT)
Dept: FAMILY MEDICINE CLINIC | Facility: CLINIC | Age: 73
End: 2023-03-06

## 2023-03-07 ENCOUNTER — TELEPHONE (OUTPATIENT)
Dept: NEPHROLOGY | Facility: CLINIC | Age: 73
End: 2023-03-07

## 2023-03-07 DIAGNOSIS — D63.1 ANEMIA DUE TO STAGE 4 CHRONIC KIDNEY DISEASE (HCC): ICD-10-CM

## 2023-03-07 DIAGNOSIS — N18.4 ANEMIA DUE TO STAGE 4 CHRONIC KIDNEY DISEASE (HCC): ICD-10-CM

## 2023-03-07 DIAGNOSIS — N05.9 GLOMERULONEPHRITIS: Primary | ICD-10-CM

## 2023-03-07 NOTE — TELEPHONE ENCOUNTER
Call from Preston from [facility] requesting that dates be changed on order  Patient has appointment on 3/8/23

## 2023-03-08 ENCOUNTER — APPOINTMENT (OUTPATIENT)
Dept: PHYSICAL THERAPY | Facility: CLINIC | Age: 73
End: 2023-03-08

## 2023-03-08 ENCOUNTER — HOSPITAL ENCOUNTER (OUTPATIENT)
Dept: INFUSION CENTER | Facility: HOSPITAL | Age: 73
Discharge: HOME | End: 2023-03-08
Attending: INTERNAL MEDICINE

## 2023-03-10 ENCOUNTER — APPOINTMENT (OUTPATIENT)
Dept: PHYSICAL THERAPY | Facility: CLINIC | Age: 73
End: 2023-03-10

## 2023-03-13 ENCOUNTER — OFFICE VISIT (OUTPATIENT)
Dept: PHYSICAL THERAPY | Facility: CLINIC | Age: 73
End: 2023-03-13

## 2023-03-13 DIAGNOSIS — Z91.81 AT HIGH RISK FOR INJURY RELATED TO FALL: ICD-10-CM

## 2023-03-13 DIAGNOSIS — R26.89 BALANCE DISORDER: Primary | ICD-10-CM

## 2023-03-13 DIAGNOSIS — R26.9 GAIT ABNORMALITY: ICD-10-CM

## 2023-03-13 DIAGNOSIS — N18.4 ANEMIA DUE TO STAGE 4 CHRONIC KIDNEY DISEASE (HCC): ICD-10-CM

## 2023-03-13 DIAGNOSIS — N05.9 GLOMERULONEPHRITIS: Primary | ICD-10-CM

## 2023-03-13 DIAGNOSIS — D63.1 ANEMIA DUE TO STAGE 4 CHRONIC KIDNEY DISEASE (HCC): ICD-10-CM

## 2023-03-13 NOTE — PROGRESS NOTES
Re-evaluation Note     Today's date: 3/13/2023  Patient name: Rushie Hodgkins  : 1950  MRN: [id number]  Referring provider: Leann Mandujano,*  Dx:   Encounter Diagnosis     ICD-10-CM    1  Balance disorder  R26 89       2  Gait abnormality  R26 9       3  At high risk for injury related to fall  Z91 81                      Subjective: has had several weeks from therpay and exercise due to having covid  For the most part patient reports being in bed and not being able to be very active at all  Objective: See treatment diary below          Objective: See treatment diary below  Balance Test     6 Minute Walk Test (ft): 1000ft RW ; 3/13 500 ft RW 3 min 20 sec   BBS 36/56;  45/56; 3/13 50/56   Gait Speed (ft/s): 0 98 m/s : 1 1 m/s 3/13   5x Sit To Stand (s): 16 85 seconds with BUE; 20 sec with B UE ; 3/13 18 91 sec   TU 94 seconds with RW;  13 22 RW; 3/13 9 63 sec        Vitals: SP02 post exercise 96%  HR: 62 bpm      Assessment: Since last progress update patient made significant improvements in balance and gait speed but overall endurance significantly declined likely due to covid  Pt will continue to benefit from 2x/week for 4 more weeks  Goals  ST  Pt will improve TUG score by 3 seconds in 4 weeks to improve his LE output and decrease his risk for falls   - met  2  Pt will improve BBS score by 7 points in 4 weeks to improve his balance and decrease the risk for falls  - met  3  Pt will be independent with HEP in 4 weeks to improve strength and functional mobility   - met    LT  Pt will ambulate with LRAD in 10 weeks to improve his QoL  - met  2  Pt will improve BBS by 10 points in 10 weeks to improve his balance and decrease risk for falls  - not met    Plan: Continue per plan of care          Precautions: fall risk       Manuals                                    Neuro Re-Ed         Side stepping 5 laps PTB at knees 5 laps btb at knees solo step GTB at knees with 3# aw 3 laps solo step Resisted at waist 2 laps Uneven mat - 4 laps solo step   Up and over airex pads        STS 10x2 10x2      Hurdles   On uneven mat - 2 hurdlses x 10   Uneven mat - 4 laps fwd, 4 laps lat   Bwd amb   In 20 degree knee flexion - 2 laps (able to maintain left kne only) Resisted GTB 3 laps solo step Uneven mat 4 laps   Step ups        Resisted walking   GTB - 3 laps solo step 3# aw Fwd GTB 3 laps fwd    Semi tandem        Cone taps    3 laps fwd with lat tap solo step    marches 2 5# aw 4 laps 3# aw 4 laps   10# bolster carry 3 laps     obstacle 6" step, airex pads, hurdles, black pads        Ther Ex        treadmill 15 min 1 8 mph solo step 15 min 1 8 mph solo step end of session 10 min 1 6 mph 5% incline 10 min 5% incline 1 6 mph CV and muscular endurance 10 min 5% incline 1 6 mph    bridges        clamshell        SLR        SL hip abd                                Ther Activity                        Gait Training        No AD        uneve surfaces  10 laps uneven mat solo step      Modalities

## 2023-03-15 ENCOUNTER — HOSPITAL ENCOUNTER (OUTPATIENT)
Dept: INFUSION CENTER | Facility: HOSPITAL | Age: 73
Discharge: HOME | End: 2023-03-15
Attending: INTERNAL MEDICINE

## 2023-03-15 DIAGNOSIS — D63.1 ANEMIA DUE TO STAGE 4 CHRONIC KIDNEY DISEASE (HCC): ICD-10-CM

## 2023-03-15 DIAGNOSIS — N18.4 ANEMIA DUE TO STAGE 4 CHRONIC KIDNEY DISEASE (HCC): ICD-10-CM

## 2023-03-15 DIAGNOSIS — N05.9 GLOMERULONEPHRITIS: Primary | ICD-10-CM

## 2023-03-17 ENCOUNTER — OFFICE VISIT (OUTPATIENT)
Dept: PHYSICAL THERAPY | Facility: CLINIC | Age: 73
End: 2023-03-17

## 2023-03-17 DIAGNOSIS — R26.9 GAIT ABNORMALITY: ICD-10-CM

## 2023-03-17 DIAGNOSIS — R26.89 BALANCE DISORDER: Primary | ICD-10-CM

## 2023-03-17 DIAGNOSIS — Z91.81 AT HIGH RISK FOR INJURY RELATED TO FALL: ICD-10-CM

## 2023-03-17 NOTE — PROGRESS NOTES
Daily Note     Today's date: 3/17/2023  Patient name: Renetta Keita  : 1950  MRN: [id number]  Referring provider: Dilma Linares,*  Dx:   Encounter Diagnosis     ICD-10-CM    1  Balance disorder  R26 89       2  Gait abnormality  R26 9       3  At high risk for injury related to fall  Z91 81                      Subjective: Patient reports to physical therapy this session with feelings of being tired and some spells of dizziness      Objective: See treatment diary below      Assessment: Patient was able to complete more activity than last session but overall was more fatigued than he had been prior to start of covid  Plan next session to attemp to return to solo step and more resistive exercises  Plan: Continue per plan of care          Precautions: fall risk       Manuals 2/17 2/21 3/17                                     Neuro Re-Ed         Side stepping 5 laps PTB at knees 5 laps btb at knees solo step      Up and over airex pads        STS 10x2 10x2      Hurdles   On uneven mat - 2 hurdlses x 10 //bars 3 laps fwd, 3 laps lat     Bwd amb        Step ups   6" //bars 10x ea      Resisted walking        Semi tandem        Cone taps        march 2 5# aw 4 laps 3# aw 4 laps       obstacle 6" step, airex pads, hurdles, black pads        Ther Ex        treadmill 15 min 1 8 mph solo step 15 min 1 8 mph solo step end of session 10 min 1 7 mph for cardiovascula rnad muscular endurance with b/l UE     bridges        clamshell        SLR        SL hip abd                                Ther Activity                        Gait Training        No AD        uneve surfaces  10 laps uneven mat solo step      Modalities

## 2023-03-20 ENCOUNTER — HOSPITAL ENCOUNTER (OUTPATIENT)
Dept: MRI IMAGING | Facility: HOSPITAL | Age: 73
Discharge: HOME | End: 2023-03-20
Attending: NEUROLOGICAL SURGERY

## 2023-03-20 ENCOUNTER — DOCUMENTATION (OUTPATIENT)
Dept: NEPHROLOGY | Facility: HOSPITAL | Age: 73
End: 2023-03-20

## 2023-03-20 ENCOUNTER — ANTICOAG VISIT (OUTPATIENT)
Dept: FAMILY MEDICINE CLINIC | Facility: CLINIC | Age: 73
End: 2023-03-20

## 2023-03-20 DIAGNOSIS — Z91.81 AT HIGH RISK FOR INJURY RELATED TO FALL: ICD-10-CM

## 2023-03-20 DIAGNOSIS — R26.89 BALANCE DISORDER: ICD-10-CM

## 2023-03-20 DIAGNOSIS — M47.12 OTHER SPONDYLOSIS WITH MYELOPATHY, CERVICAL REGION: ICD-10-CM

## 2023-03-20 DIAGNOSIS — Z98.1 S/P CERVICAL SPINAL FUSION: ICD-10-CM

## 2023-03-20 LAB
EXT BILIRUBIN, UA: NEGATIVE
EXT BLOOD, UA: ABNORMAL
EXT COLOR, UA: YELLOW
EXT DIFF-ABS BASOPHILS: 0.1
EXT DIFF-ABS EOSINOPHILS: 0.1
EXT DIFF-ABS LYMPHOCYTES: 1.2
EXT DIFF-ABS MONOCYTES: 0.4
EXT DIFF-ABS NEUTROPHILS: 3.5
EXT GLUCOSE BLD: 122
EXT GLUCOSE, UA: NEGATIVE
EXT KETONES: NEGATIVE
EXT NITRITE, UA: NEGATIVE
EXT PH, UA: 5.5
EXT PROTEIN, UA: NEGATIVE
EXT SPECIFIC GRAVITY, UA: 1.01
EXT UROBILINOGEN: 0.2
EXTERNAL ALBUMIN: 4.5
EXTERNAL ALK PHOS: 91
EXTERNAL ALT: 10
EXTERNAL AST: 17
EXTERNAL BACTERIA (UA): ABNORMAL
EXTERNAL BICARBONATE: 24
EXTERNAL BUN: 43
EXTERNAL CALCIUM: 10
EXTERNAL CASTS (UA): ABNORMAL
EXTERNAL CHLORIDE: 100
EXTERNAL CREATININE: 2.74
EXTERNAL HEMATOCRIT: 34 %
EXTERNAL HEMOGLOBIN: 11.3 G/DL
EXTERNAL MCV: 88
EXTERNAL PLATELET COUNT: 234 K/ÂΜL
EXTERNAL POTASSIUM: 4.2
EXTERNAL RBC (UA): ABNORMAL
EXTERNAL RBC: 3.85
EXTERNAL RDW: 14
EXTERNAL SODIUM: 140
EXTERNAL T.BILIRUBIN: 0.2
EXTERNAL TOTAL PROTEIN: 7.3
EXTERNAL WBC (UA): ABNORMAL
EXTERNAL WBC: 5.3

## 2023-03-21 ENCOUNTER — OFFICE VISIT (OUTPATIENT)
Dept: PHYSICAL THERAPY | Facility: CLINIC | Age: 73
End: 2023-03-21

## 2023-03-21 DIAGNOSIS — R26.9 GAIT ABNORMALITY: ICD-10-CM

## 2023-03-21 DIAGNOSIS — Z91.81 AT HIGH RISK FOR INJURY RELATED TO FALL: ICD-10-CM

## 2023-03-21 DIAGNOSIS — R26.89 BALANCE DISORDER: Primary | ICD-10-CM

## 2023-03-21 NOTE — PROGRESS NOTES
Daily Note     Today's date: 3/21/2023  Patient name: Sosa Myers  : 1950  MRN: [id number]  Referring provider: Dalton Lopez,*  Dx:   Encounter Diagnosis     ICD-10-CM    1  Balance disorder  R26 89       2  Gait abnormality  R26 9       3  At high risk for injury related to fall  Z91 81                      Subjective: Patient reports feeling very well today compared to last session  Has felt well the last two days  Objective: See treatment diary below      Assessment: Increased speed with treadmill this date which he was able to complete but did feel much more fatigued afterwards  He also was able to complete step taps from foam to cone without loss of balance and without UE for the firs ttime  Much less Pain in knees compared to previous trials  Completed session in //bars as solo step was not available  Plan: Continue per plan of care          Precautions: fall risk       Manuals 2/17 2/21 3/17 3/21                                    Neuro Re-Ed         Side stepping 5 laps PTB at knees 5 laps btb at knees solo step      Up and over airex pads        STS 10x2 10x2  10x2    Hurdles   On uneven mat - 2 hurdlses x 10 //bars 3 laps fwd, 3 laps lat 3 laps fwd, 3l aps lat no UE    Bwd amb        Step ups   6" //bars 10x ea  6" //bars 10x ea    Resisted walking        Semi tandem        Cone taps    From airex pad - no UE 30x    marches 2 5# aw 4 laps 3# aw 4 laps   5 laps //bars no UE    obstacle 6" step, airex pads, hurdles, black pads        Ther Ex        treadmill 15 min 1 8 mph solo step 15 min 1 8 mph solo step end of session 10 min 1 7 mph for cardiovascula rnad muscular endurance with b/l UE 10 min 2 0 mph for cardiovascula rnad muscular endurance with b/l UE    bridges        clamshell        SLR        SL hip abd                                Ther Activity                        Gait Training        No AD        uneve surfaces  10 laps uneven mat solo step      Modalities

## 2023-03-22 ENCOUNTER — OFFICE VISIT (OUTPATIENT)
Dept: FAMILY MEDICINE CLINIC | Facility: CLINIC | Age: 73
End: 2023-03-22

## 2023-03-22 ENCOUNTER — HOSPITAL ENCOUNTER (OUTPATIENT)
Dept: INFUSION CENTER | Facility: HOSPITAL | Age: 73
Discharge: HOME | End: 2023-03-22
Attending: INTERNAL MEDICINE

## 2023-03-22 VITALS
DIASTOLIC BLOOD PRESSURE: 80 MMHG | SYSTOLIC BLOOD PRESSURE: 124 MMHG | HEART RATE: 60 BPM | BODY MASS INDEX: 35.82 KG/M2 | WEIGHT: 256.8 LBS

## 2023-03-22 DIAGNOSIS — N18.4 ANEMIA DUE TO STAGE 4 CHRONIC KIDNEY DISEASE (HCC): ICD-10-CM

## 2023-03-22 DIAGNOSIS — D68.51 FACTOR V LEIDEN MUTATION (HCC): ICD-10-CM

## 2023-03-22 DIAGNOSIS — I48.0 PAROXYSMAL A-FIB (HCC): ICD-10-CM

## 2023-03-22 DIAGNOSIS — D63.1 ANEMIA DUE TO STAGE 4 CHRONIC KIDNEY DISEASE (HCC): ICD-10-CM

## 2023-03-22 DIAGNOSIS — H25.9 SENILE CATARACT OF RIGHT EYE, UNSPECIFIED AGE-RELATED CATARACT TYPE: ICD-10-CM

## 2023-03-22 DIAGNOSIS — Z01.818 PRE-OP EXAMINATION: Primary | ICD-10-CM

## 2023-03-22 DIAGNOSIS — L30.9 ECZEMA, UNSPECIFIED TYPE: ICD-10-CM

## 2023-03-22 RX ORDER — MOMETASONE FUROATE 1 MG/G
CREAM TOPICAL DAILY
Qty: 45 G | Refills: 0 | Status: SHIPPED | OUTPATIENT
Start: 2023-03-22

## 2023-03-22 NOTE — PROGRESS NOTES
[id number]         NAME: Alana Mckeon is a 68 y o  male  : 1950    MRN: [id number]  DATE: 2023  TIME: 8:39 AM    Assessment and Plan   Pre-op examination [H05 851]  3  Pre-op examination        2  Senile cataract of right eye, unspecified age-related cataract type        3  Eczema, unspecified type  mometasone (ELOCON) 0 1 % cream      4  Paroxysmal A-fib ([facility] )        5  Factor V Leiden mutation ([facility] )        6  Anemia due to stage 4 chronic kidney disease ([facility] )            No problem-specific Assessment & Plan notes found for this encounter  Patient Instructions     Patient Instructions   Patient medically stable  Cleared for proposed cataract surgery  Elocon cream for rash on legs  I would speak to orthopedist about the right shoulder probably impingement  Chief Complaint     Chief Complaint   Patient presents with   • Pre-op Exam     Cataract right eye 23         History of Present Illness       Patient here for preoperative valuation for planned right cataract procedure  Operating surgeon Dr Juan Antonio Marquez  He does down at outpatient center in [place]  No preoperative testing was requested  Patient is on Coumadin operating surgeon does not feel he needs to stop this  Review of Systems   Review of Systems   Constitutional: Negative for activity change, appetite change, diaphoresis and fatigue  HENT: Negative for congestion, sinus pressure and sore throat  Respiratory: Negative for cough, chest tightness, shortness of breath and wheezing  Cardiovascular: Negative for chest pain, palpitations and leg swelling  Fast or slow heart rate   Gastrointestinal: Negative for abdominal pain, blood in stool, constipation, diarrhea, nausea and vomiting  Genitourinary: Negative for difficulty urinating, dysuria, frequency and hematuria  Musculoskeletal: Negative for arthralgias, gait problem, joint swelling and myalgias  Neurological: Negative for dizziness, light-headedness and headaches  Psychiatric/Behavioral: Negative for agitation, confusion, dysphoric mood and sleep disturbance  The patient is not nervous/anxious  Current Medications       Current Outpatient Medications:   •  mometasone (ELOCON) 0 1 % cream, Apply topically daily, Disp: 45 g, Rfl: 0  •  acetaminophen (TYLENOL) 500 mg tablet, Take 500 mg by mouth if needed for mild pain, Disp: , Rfl:   •  amLODIPine (NORVASC) 5 mg tablet, Take 1 tablet (5 mg total) by mouth daily, Disp: 90 tablet, Rfl: 3  •  doxazosin (CARDURA) 1 mg tablet, TAKE 1 TABLET BY MOUTH DAILY AT BEDTIME, Disp: 90 tablet, Rfl: 1  •  DULoxetine (CYMBALTA) 60 mg delayed release capsule, Take 1 capsule (60 mg total) by mouth daily, Disp: 90 capsule, Rfl: 1  •  epoetin asha (Epogen) 2,000 units/mL, Inject under the skin every 14 (fourteen) days, Disp: , Rfl:   •  flecainide (TAMBOCOR) 50 mg tablet, Take 1 tablet (50 mg total) by mouth 2 (two) times a day, Disp: 180 tablet, Rfl: 3  •  gabapentin (NEURONTIN) 300 mg capsule, TAKE 1 CAPSULE BY MOUTH  DAILY AT BEDTIME, Disp: 90 capsule, Rfl: 1  •  methocarbamol (ROBAXIN) 750 mg tablet, Take 1 tablet (750 mg total) by mouth every 6 (six) hours as needed for muscle spasms, Disp: 30 tablet, Rfl: 0  •  metoprolol succinate (TOPROL-XL) 100 mg 24 hr tablet, TAKE 1 TABLET BY MOUTH  DAILY, Disp: 90 tablet, Rfl: 0  •  pravastatin (PRAVACHOL) 40 mg tablet, TAKE 1 TABLET BY MOUTH  DAILY, Disp: 90 tablet, Rfl: 1  •  tamsulosin (FLOMAX) 0 4 mg, TAKE 1 CAPSULE BY MOUTH  DAILY WITH DINNER, Disp: 90 capsule, Rfl: 1  •  torsemide (DEMADEX) 20 mg tablet, TAKE 1 TABLET BY MOUTH EVERY DAY, Disp: 90 tablet, Rfl: 1  •  warfarin (COUMADIN) 5 mg tablet, Take 2 5 mg (1/2 tablets) daily except Wednesday and Saturday take 5 mg daily  , Disp: 90 tablet, Rfl: 1  •  zolpidem (AMBIEN) 10 mg tablet, Take 1 tablet (10 mg total) by mouth daily at bedtime as needed for sleep, Disp: 90 tablet, Rfl: 0    Current Allergies     Allergies as of 03/22/2023 - Reviewed 03/22/2023   Allergen Reaction Noted   • Morphine GI Intolerance 09/11/2018   • Vitamin k Other (See Comments) 12/28/2020            The following portions of the patient's history were reviewed and updated as appropriate: allergies, current medications, past family history, past medical history, past social history, past surgical history and problem list      Past Medical History:   Diagnosis Date   • Acute venous embolism and thrombosis of deep vessels of proximal lower extremity ([facility] )     Last assessed: 5/18/15   • Arthritis    • Cellulitis     LE   • Chronic kidney disease 3/2020    Acute Kidney Injury   • CPAP (continuous positive airway pressure) dependence    • Factor V Leiden ([facility] [address] )    • Forgetfulness    • Gross hematuria 5/17/2022   • Headache(784 0) 3/2022    Never till cervical  spine surgery   • Heart murmur 3/2020    Told when in hospital   • [facility] (hard of hearing)    • Hypoalbuminemia 5/11/2022   • Other acute osteomyelitis, other site ([facility] ) 1/3/2023   • Paroxysmal atrial fibrillation ([facility] [address] )     Last assessed: 11/2/15   • Sleep apnea        Past Surgical History:   Procedure Laterality Date   • BACK SURGERY      Lower   • COLON SURGERY     • COLONOSCOPY     • INCISION AND DRAINAGE POSTERIOR SPINE N/A 4/1/2022    Procedure: Posterior cervical evacuation of postoperative collection and debridement with placement of drains C3-T1; Surgeon: Elina Lynn MD;  [facility];  Service: Neurosurgery   • IR BIOPSY KIDNEY RANDOM  5/26/2022   • IR TUNNELED DIALYSIS CATHETER PLACEMENT  5/23/2022   • IR TUNNELED DIALYSIS CATHETER REMOVAL  6/2/2022   • [place] ARTHRD ANT INTERBODY  [address] CRV BELOW C2 N/A 3/11/2022    Procedure: Anterior cervical discectomy and fixation fusion C5/6 and C6/7; Posterior cervical decompression and instrumented fusion C3-T1;   Surgeon: Elina Lynn MD;  [facility];  Service: Neurosurgery   • SPINE SURGERY  3/11/2022    Cervical myelopathy/ cervical fusion       Family History   Problem Relation Age of Onset   • Lung cancer Mother    • Hearing loss Father    • Heart attack Brother    • Atrial fibrillation Brother    • Emphysema Sister          Medications have been verified  Objective   /80   Pulse 60   Wt 116 kg (256 lb 12 8 oz)   BMI 35 82 kg/m²        Physical Exam     Physical Exam  Constitutional:       Appearance: He is well-developed  HENT:      Head: Normocephalic and atraumatic  Right Ear: Tympanic membrane and external ear normal       Left Ear: Tympanic membrane and external ear normal       Nose: Nose normal       Mouth/Throat:      Pharynx: Uvula midline  Eyes:      General:         Right eye: No discharge  Left eye: No discharge  Conjunctiva/sclera: Conjunctivae normal       Pupils: Pupils are equal, round, and reactive to light  Neck:      Thyroid: No thyromegaly  Cardiovascular:      Rate and Rhythm: Normal rate and regular rhythm  Heart sounds: Normal heart sounds  No murmur heard  Pulmonary:      Effort: Pulmonary effort is normal       Breath sounds: Normal breath sounds  No wheezing or rales  Abdominal:      General: Bowel sounds are normal       Palpations: Abdomen is soft  There is no mass  Tenderness: There is no abdominal tenderness  Musculoskeletal:         General: No deformity  Normal range of motion  Cervical back: Normal range of motion and neck supple  Lymphadenopathy:      Cervical: No cervical adenopathy  Skin:     General: Skin is warm  Findings: Erythema and rash present  Neurological:      Mental Status: He is alert and oriented to person, place, and time  Cranial Nerves: No cranial nerve deficit  Deep Tendon Reflexes: Reflexes are normal and symmetric     Psychiatric:         Behavior: Behavior normal

## 2023-03-22 NOTE — PATIENT INSTRUCTIONS
Patient medically stable  Cleared for proposed cataract surgery  Elocon cream for rash on legs  I would speak to orthopedist about the right shoulder probably impingement

## 2023-03-23 ENCOUNTER — OFFICE VISIT (OUTPATIENT)
Dept: NEUROSURGERY | Facility: CLINIC | Age: 73
End: 2023-03-23

## 2023-03-23 VITALS
DIASTOLIC BLOOD PRESSURE: 68 MMHG | HEART RATE: 56 BPM | SYSTOLIC BLOOD PRESSURE: 126 MMHG | WEIGHT: 252 LBS | HEIGHT: 71 IN | TEMPERATURE: 97.8 F | BODY MASS INDEX: 35.28 KG/M2 | RESPIRATION RATE: 16 BRPM

## 2023-03-23 DIAGNOSIS — M25.561 BILATERAL KNEE PAIN: ICD-10-CM

## 2023-03-23 DIAGNOSIS — M25.562 BILATERAL KNEE PAIN: ICD-10-CM

## 2023-03-23 DIAGNOSIS — Z98.1 S/P CERVICAL SPINAL FUSION: Primary | ICD-10-CM

## 2023-03-23 NOTE — PROGRESS NOTES
DISCUSSION SUMMARY   This is a 68 y o  male overall improving but will likley take another 6 - 12 months to maximize improvement  Will need delayed images to insure fusion is complete    Return in about 1 year (around 3/23/2024) for F/U after test completed (solo DKO)  Diagnosis ICD-10-CM Associated Orders   1  S/P cervical spinal fusion  Z98 1 XR spine cervical complete 6+ vw flex/ext/obl      2  Bilateral knee pain  M25 561 Ambulatory referral to Orthopedic Surgery    M25 562            Chief Complaint   Follow-up (4 months f/u after MRI cervical 3/20/2023 s/p cervical fusion; PT @ [facility] 11/2022 - present)       History of Present Illness   Patient is known to our office for the following:   S/p ACDF C5-6 and C6-7; PCDF C3-T1 3/11/22 (DKO)   S/p posterior cervical evacuation of postoperative collection and debridement with placement of drains C3-T1 4/1/22 (DKO)     Last seen on 11/22/2022 (DKO) after second surgery which was complicated by a postoperative collection and debridement  He was improving neurologically although he continues to be at high risk for gait and balance problems and is at a high fall risk because of this   Using a walker and was encouraged to use this on a regular basis   I did recommend continued physical therapy  In general his gait is improving although he continues to use a walker for stability  His neck pain is reduced to a 3 out of 10 and he does have neck stiffness which is to be expected following this type of surgery  Review of Systems   Constitutional: Negative  HENT: Positive for hearing loss (wearing hearing loss)  Eyes: Positive for visual disturbance (scheduled for cataract surgery in a couple weeks)  Respiratory: Negative  Cardiovascular: Negative  Gastrointestinal: Negative  Endocrine: Negative      Genitourinary:        H/o kidney failure   Musculoskeletal: Positive for gait problem (using a walker), neck pain (3/10) and neck stiffness "(intermittent)  Skin: Negative  Allergic/Immunologic: Negative  Neurological: Positive for light-headedness, numbness (back of neck) and headaches  Hematological: Negative  Psychiatric/Behavioral: Negative  All other systems reviewed and are negative  Reviewed the ROS  Vitals:    /68 (BP Location: Right arm, Patient Position: Sitting, Cuff Size: Standard)   Pulse 56   Temp 97 8 °F (36 6 °C) (Temporal)   Resp 16   Ht 5' 11\" (1 803 m)   Wt 114 kg (252 lb)   BMI 35 15 kg/m²     MEDICAL HISTORY  Past Medical History:   Diagnosis Date   • Acute venous embolism and thrombosis of deep vessels of proximal lower extremity (HCC)     Last assessed: 5/18/15   • Arthritis    • Cellulitis     LE   • Chronic kidney disease 3/2020    Acute Kidney Injury   • CPAP (continuous positive airway pressure) dependence    • Factor V Leiden ([facility] )    • Forgetfulness    • Gross hematuria 5/17/2022   • Headache(784 0) 3/2022    Never till cervical  spine surgery   • Heart murmur 3/2020    Told when in hospital   • [facility] (hard of hearing)    • Hypoalbuminemia 5/11/2022   • Other acute osteomyelitis, other site ([facility] [address] ) 1/3/2023   • Paroxysmal atrial fibrillation ([facility] )     Last assessed: 11/2/15   • Sleep apnea      Past Surgical History:   Procedure Laterality Date   • BACK SURGERY      Lower   • COLON SURGERY     • COLONOSCOPY     • INCISION AND DRAINAGE POSTERIOR SPINE N/A 4/1/2022    Procedure: Posterior cervical evacuation of postoperative collection and debridement with placement of drains C3-T1;   Surgeon: Shelly Thurman MD;  [facility];  Service: Neurosurgery   • IR BIOPSY KIDNEY RANDOM  5/26/2022   • IR TUNNELED DIALYSIS CATHETER PLACEMENT  5/23/2022   • IR TUNNELED DIALYSIS CATHETER REMOVAL  6/2/2022   • NE ARTHRD ANT INTERBODY  [address] CRV BELOW C2 N/A 3/11/2022    Procedure: Anterior cervical discectomy and fixation fusion C5/6 and C6/7; Posterior cervical decompression and instrumented " fusion C3-T1;   Surgeon: Odessa Baumgarten, MD;  [facility];  Service: Neurosurgery   • SPINE SURGERY  3/11/2022    Cervical myelopathy/ cervical fusion     Social History     Tobacco Use   • Smoking status: Never   • Smokeless tobacco: Never   Vaping Use   • Vaping Use: Never used   Substance Use Topics   • Alcohol use: Not Currently     Alcohol/week: 0 0 standard drinks   • Drug use: No      Family History   Problem Relation Age of Onset   • Lung cancer Mother    • Hearing loss Father    • Heart attack Brother    • Atrial fibrillation Brother    • Emphysema Sister         Current Outpatient Medications:   •  acetaminophen (TYLENOL) 500 mg tablet, Take 500 mg by mouth if needed for mild pain, Disp: , Rfl:   •  doxazosin (CARDURA) 1 mg tablet, TAKE 1 TABLET BY MOUTH DAILY AT BEDTIME, Disp: 90 tablet, Rfl: 1  •  DULoxetine (CYMBALTA) 60 mg delayed release capsule, Take 1 capsule (60 mg total) by mouth daily, Disp: 90 capsule, Rfl: 1  •  epoetin asha (Epogen) 2,000 units/mL, Inject under the skin every 14 (fourteen) days, Disp: , Rfl:   •  flecainide (TAMBOCOR) 50 mg tablet, Take 1 tablet (50 mg total) by mouth 2 (two) times a day, Disp: 180 tablet, Rfl: 3  •  gabapentin (NEURONTIN) 300 mg capsule, TAKE 1 CAPSULE BY MOUTH  DAILY AT BEDTIME, Disp: 90 capsule, Rfl: 1  •  methocarbamol (ROBAXIN) 750 mg tablet, Take 1 tablet (750 mg total) by mouth every 6 (six) hours as needed for muscle spasms, Disp: 30 tablet, Rfl: 0  •  metoprolol succinate (TOPROL-XL) 100 mg 24 hr tablet, TAKE 1 TABLET BY MOUTH  DAILY, Disp: 90 tablet, Rfl: 0  •  mometasone (ELOCON) 0 1 % cream, Apply topically daily, Disp: 45 g, Rfl: 0  •  pravastatin (PRAVACHOL) 40 mg tablet, TAKE 1 TABLET BY MOUTH  DAILY, Disp: 90 tablet, Rfl: 1  •  tamsulosin (FLOMAX) 0 4 mg, TAKE 1 CAPSULE BY MOUTH  DAILY WITH DINNER, Disp: 90 capsule, Rfl: 1  •  torsemide (DEMADEX) 20 mg tablet, TAKE 1 TABLET BY MOUTH EVERY DAY, Disp: 90 tablet, Rfl: 1  •  warfarin (COUMADIN) 5 mg tablet, Take 2 5 mg (1/2 tablets) daily except Wednesday and Saturday take 5 mg daily  , Disp: 90 tablet, Rfl: 1  •  zolpidem (AMBIEN) 10 mg tablet, Take 1 tablet (10 mg total) by mouth daily at bedtime as needed for sleep, Disp: 90 tablet, Rfl: 0  •  amLODIPine (NORVASC) 5 mg tablet, Take 1 tablet (5 mg total) by mouth daily, Disp: 90 tablet, Rfl: 3     Allergies   Allergen Reactions   • Morphine GI Intolerance   • Vitamin K Other (See Comments)     On coumadin-patient sensitive to vitamin K amounts in meds etc         The following portions of the patient's history were updated by MA and reviewed by MD: allergies, current medications, past family history, past medical history, past social history, past surgical history and problem list     Physical Exam  Vitals and nursing note reviewed  Constitutional:       General: He is not in acute distress  Appearance: Normal appearance  He is normal weight  He is not ill-appearing, toxic-appearing or diaphoretic  HENT:      Head: Normocephalic and atraumatic  Nose: Nose normal    Eyes:      Extraocular Movements: Extraocular movements intact  Pupils: Pupils are equal, round, and reactive to light  Musculoskeletal:         General: No swelling, tenderness, deformity or signs of injury  Normal range of motion  Cervical back: Normal range of motion and neck supple  Right lower leg: No edema  Left lower leg: No edema  Skin:     General: Skin is warm and dry  Neurological:      General: No focal deficit present  Mental Status: He is alert and oriented to person, place, and time  Mental status is at baseline  Cranial Nerves: No cranial nerve deficit  Sensory: No sensory deficit  Motor: No weakness  Coordination: Coordination normal       Gait: Gait abnormal (  Use a walker for stability  He cannot perform tandem gait )        Deep Tendon Reflexes: Reflexes abnormal ( Hyperreflexic at 3 out of 4 without clonus)  Psychiatric:         Mood and Affect: Mood normal          Behavior: Behavior normal          Thought Content: Thought content normal          Judgment: Judgment normal          RESULTS/DATA  I have personally reviewed pertinent films in PACS     MRI of the cervical spine is carefully reviewed to the preoperative study  There is good decompression and no development of syringomyelia on this current study  The alignment is much improved    X-rays also are obtained which demonstrate the instrumentation to be in good position without signs of loosening or breakage

## 2023-03-24 ENCOUNTER — OFFICE VISIT (OUTPATIENT)
Dept: PHYSICAL THERAPY | Facility: CLINIC | Age: 73
End: 2023-03-24

## 2023-03-24 DIAGNOSIS — R26.9 GAIT ABNORMALITY: ICD-10-CM

## 2023-03-24 DIAGNOSIS — R26.89 BALANCE DISORDER: Primary | ICD-10-CM

## 2023-03-24 DIAGNOSIS — Z91.81 AT HIGH RISK FOR INJURY RELATED TO FALL: ICD-10-CM

## 2023-03-24 NOTE — PROGRESS NOTES
Daily Note     Today's date: 3/24/2023  Patient name: Monika Carlisle  : 1950  MRN: [id number]  Referring provider: Zainab Joel,*  Dx:   Encounter Diagnosis     ICD-10-CM    1  Balance disorder  R26 89       2  Gait abnormality  R26 9       3  At high risk for injury related to fall  Z91 81                      Subjective: Patient reports no new change ssince last session      Objective: See treatment diary below      Assessment: Patient had mor edifficulty overall with activities today compared to previous session  He was having more knee pain and overal fatgued easily  Plan: Continue per plan of care          Precautions: fall risk       Manuals 2/17 2/21 3/17 3/21 3/24                                   Neuro Re-Ed         Side stepping 5 laps PTB at knees 5 laps btb at knees solo step   3 laps solo step   Up and over airex pads        STS 10x2 10x2  10x2    Hurdles   On uneven mat - 2 hurdlses x 10 //bars 3 laps fwd, 3 laps lat 3 laps fwd, 3l aps lat no UE 3 laps fwd/lat ea   Bwd amb        Step ups   6" //bars 10x ea  6" //bars 10x ea    Resisted walking        Semi tandem        Cone taps    From airex pad - no UE 30x    marches 2 5# aw 4 laps 3# aw 4 laps   5 laps //bars no UE 5 laps solo step   obstacle 6" step, airex pads, hurdles, black pads        Ther Ex        treadmill 15 min 1 8 mph solo step 15 min 1 8 mph solo step end of session 10 min 1 7 mph for cardiovascula rnad muscular endurance with b/l UE 10 min 2 0 mph for cardiovascula rnad muscular endurance with b/l UE 5 min 2 0 ph b/ UE   bridges        clamshell        SLR        SL hip abd                                Ther Activity                        Gait Training        No AD        uneve surfaces  10 laps uneven mat solo step      Modalities

## 2023-03-28 ENCOUNTER — OFFICE VISIT (OUTPATIENT)
Dept: PHYSICAL THERAPY | Facility: CLINIC | Age: 73
End: 2023-03-28

## 2023-03-28 DIAGNOSIS — Z91.81 AT HIGH RISK FOR INJURY RELATED TO FALL: ICD-10-CM

## 2023-03-28 DIAGNOSIS — R26.89 BALANCE DISORDER: Primary | ICD-10-CM

## 2023-03-28 DIAGNOSIS — R26.9 GAIT ABNORMALITY: ICD-10-CM

## 2023-03-31 ENCOUNTER — OFFICE VISIT (OUTPATIENT)
Dept: PHYSICAL THERAPY | Facility: CLINIC | Age: 73
End: 2023-03-31

## 2023-03-31 DIAGNOSIS — R26.9 GAIT ABNORMALITY: ICD-10-CM

## 2023-03-31 DIAGNOSIS — Z91.81 AT HIGH RISK FOR INJURY RELATED TO FALL: ICD-10-CM

## 2023-03-31 DIAGNOSIS — R26.89 BALANCE DISORDER: Primary | ICD-10-CM

## 2023-03-31 NOTE — PROGRESS NOTES
"Daily Note     Today's date: 3/31/2023  Patient name: Claire Perrin  : 1950  MRN: [id number]  Referring provider: Xenia Nunn,*  Dx:   Encounter Diagnosis     ICD-10-CM    1  Balance disorder  R26 89       2  Gait abnormality  R26 9       3  At high risk for injury related to fall  Z91 81                      Subjective: Patient reports not feeling well today, very tired today    Objective: See treatment diary below      Assessment: Overall pt took long periods of time to complete exercises more so than normal  Very fatigued after treadmill walking  He does however continues to progress with stability  Able to complete step taps without knocking over cones for the first time today  Plan: Continue per plan of care          Precautions: fall risk       Manuals 3/31  3/17 3/21 3/24                                   Neuro Re-Ed         Side stepping     3 laps solo step   Up and over airex pads Pad to pad - 5 laps fwd/lat ea solo step       STS    10x2    Hurdles    //bars 3 laps fwd, 3 laps lat 3 laps fwd, 3l aps lat no UE 3 laps fwd/lat ea   Bwd amb        Step ups   6\" //bars 10x ea  6\" //bars 10x ea    Resisted walking        Semi tandem        Cone taps Laterally [place] step 4 laps    From airex pad - no UE 30x    marches    5 laps //bars no UE 5 laps solo step   obstacle        Ther Ex        treadmill 10 min 2 0 mph for cardiovascula rnad muscular endurance with b/l UE 5% incline  10 min 1 7 mph for cardiovascula rnad muscular endurance with b/l UE 10 min 2 0 mph for cardiovascula rnad muscular endurance with b/l UE 5 min 2 0 ph b/ UE   bridges        clamshell        SLR        SL hip abd                                Ther Activity                        Gait Training        No AD        uneve surfaces        Modalities                             "

## 2023-03-31 NOTE — PROGRESS NOTES
"Daily Note     Today's date: 3/31/2023  Patient name: Claire Perrin  : 1950  MRN: [id number]  Referring provider: Xenia Nunn,*  Dx:   Encounter Diagnosis     ICD-10-CM    1  Balance disorder  R26 89       2  Gait abnormality  R26 9       3  At high risk for injury related to fall  Z91 81                      Subjective: Patient reports feeling well today, has some more energy       Objective: See treatment diary below      Assessment: Pt had significant improvement in stance stability during activities today and overall was able to toleratemuch more this session compared to last two  Will complete re-evlauation at next session  Plan: Continue per plan of care          Precautions: fall risk       Manuals 3/31   3/21 3/24                                   Neuro Re-Ed         Side stepping     3 laps solo step   Up and over airex pads        STS 3x10 without UE   10x2    Hurdles  6\" with airex pads between: 4 laps ea fwd/lat   3 laps fwd, 3l aps lat no UE 3 laps fwd/lat ea   Bwd amb        Step ups    6\" //bars 10x ea    Resisted walking        Semi tandem        Cone taps    From airex pad - no UE 30x    marches 5 laps solo step   5 laps //bars no UE 5 laps solo step   obstacle        Ther Ex        treadmill 15 min 1 9 mph for cardiovascula rnad muscular endurance with b/l UE   10 min 2 0 mph for cardiovascula rnad muscular endurance with b/l UE 5 min 2 0 ph b/ UE   bridges        clamshell        SLR        SL hip abd                                Ther Activity                        Gait Training        No AD        uneve surfaces        Modalities                             "

## 2023-04-03 ENCOUNTER — TELEPHONE (OUTPATIENT)
Dept: CARDIOLOGY CLINIC | Facility: CLINIC | Age: 73
End: 2023-04-03

## 2023-04-03 ENCOUNTER — ANTICOAG VISIT (OUTPATIENT)
Dept: FAMILY MEDICINE CLINIC | Facility: CLINIC | Age: 73
End: 2023-04-03

## 2023-04-03 DIAGNOSIS — I48.0 PAROXYSMAL A-FIB (HCC): ICD-10-CM

## 2023-04-03 DIAGNOSIS — I10 ESSENTIAL HYPERTENSION: ICD-10-CM

## 2023-04-03 DIAGNOSIS — I48.91 ATRIAL FIBRILLATION, UNSPECIFIED TYPE (HCC): ICD-10-CM

## 2023-04-03 RX ORDER — WARFARIN SODIUM 5 MG/1
TABLET ORAL
Qty: 78 TABLET | Refills: 0 | Status: ON HOLD | OUTPATIENT
Start: 2023-04-03

## 2023-04-03 RX ORDER — METOPROLOL SUCCINATE 100 MG/1
TABLET, EXTENDED RELEASE ORAL
Qty: 90 TABLET | Refills: 0 | Status: ON HOLD | OUTPATIENT
Start: 2023-04-03

## 2023-04-03 NOTE — TELEPHONE ENCOUNTER
Marj called in from [facility] surgery [place], pt is going to be having cataract surgery and they need last ov note along with EKG    Faxed ov note from 8/22/23 along w/ekg to [phone]

## 2023-04-04 ENCOUNTER — OFFICE VISIT (OUTPATIENT)
Dept: PHYSICAL THERAPY | Facility: CLINIC | Age: 73
End: 2023-04-04

## 2023-04-04 DIAGNOSIS — Z91.81 AT HIGH RISK FOR INJURY RELATED TO FALL: ICD-10-CM

## 2023-04-04 DIAGNOSIS — R26.9 GAIT ABNORMALITY: ICD-10-CM

## 2023-04-04 DIAGNOSIS — R26.89 BALANCE DISORDER: Primary | ICD-10-CM

## 2023-04-05 ENCOUNTER — OFFICE VISIT (OUTPATIENT)
Dept: PHYSICAL THERAPY | Facility: CLINIC | Age: 73
End: 2023-04-05

## 2023-04-05 DIAGNOSIS — R26.9 GAIT ABNORMALITY: ICD-10-CM

## 2023-04-05 DIAGNOSIS — R26.89 BALANCE DISORDER: Primary | ICD-10-CM

## 2023-04-05 NOTE — PROGRESS NOTES
"Daily Note     Today's date: 2023  Patient name: Edna Zacarias  : 1950  MRN: [id number]  Referring provider: Alexis Cotto,*  Dx:   Encounter Diagnosis     ICD-10-CM    1  Balance disorder  R26 89       2  Gait abnormality  R26 9                      Subjective: Patient reports feeling sore in his arms and his knees this morning  Objective: See treatment diary below      Assessment: Pt was slow to complete standing activities due to knee pain  Was able to maintain single leg stance b/l long enough to complete a double cone tap which is a demonstration of significant progress in balance  Plan to attempt outdoor walking again at next session as he is able  Plan: Continue per plan of care          Precautions: fall risk       Manuals 3/31 4/5  3/21 3/24                                   Neuro Re-Ed         Side stepping  With ytb around toes - 15 ft x 3 ea   3 laps solo step   Up and over airex pads        STS 3x10 without UE 10x2  10x2    Hurdles  6\" with airex pads between: 4 laps ea fwd/lat   3 laps fwd, 3l aps lat no UE 3 laps fwd/lat ea   Bwd amb  4 laps //bars min UE      Step ups    6\" //bars 10x ea    Resisted walking        Semi tandem  4 laps solo step      Cone taps  2 cones - 40x  From airex pad - no UE 30x    marches 5 laps solo step 5 laps solo step  5 laps //bars no UE 5 laps solo step   obstacle        Ther Ex        treadmill 15 min 1 9 mph for cardiovascula rnad muscular endurance with b/l UE 5 min 1 9 mph b/l UE - for CV endurance: ended early due to knee pain and overall fatigue  10 min 2 0 mph for cardiovascula rnad muscular endurance with b/l UE 5 min 2 0 ph b/ UE   bridges        clamshell        SLR        SL hip abd                                Ther Activity                        Gait Training        No AD        uneve surfaces        Modalities                             "

## 2023-04-07 ENCOUNTER — APPOINTMENT (OUTPATIENT)
Dept: PHYSICAL THERAPY | Facility: CLINIC | Age: 73
End: 2023-04-07

## 2023-04-07 NOTE — PROGRESS NOTES
"Daily Note     Today's date: 2023  Patient name: Nicole García  : 1950  MRN: [id number]  Referring provider: Hedy Euceda,*  Dx:   Encounter Diagnosis     ICD-10-CM    1  Balance disorder  R26 89       2  Gait abnormality  R26 9       3  At high risk for injury related to fall  Z91 81                      Subjective: Patient reports to physical therapy this session feeling very well       Objective: See treatment diary below      Assessment: Patient was able to complete outdoor ambulation for 25 min over grassy surfaces and up and down curbs  No loss of balance on grass but occassional swaying with CGa by therapist throughout  Plan: Continue per plan of care          Precautions: fall risk       Manuals 3/31   3/21 3/24                                   Neuro Re-Ed         Side stepping     3 laps solo step   Up and over airex pads        STS 3x10 without UE   10x2    Hurdles  6\" with airex pads between: 4 laps ea fwd/lat   3 laps fwd, 3l aps lat no UE 3 laps fwd/lat ea   Bwd amb        Step ups    6\" //bars 10x ea    Resisted walking        Semi tandem        Cone taps    From airex pad - no UE 30x    marches 5 laps solo step   5 laps //bars no UE 5 laps solo step   obstacle        Ther Ex        treadmill 15 min 1 9 mph for cardiovascula rnad muscular endurance with b/l UE   10 min 2 0 mph for cardiovascula rnad muscular endurance with b/l UE 5 min 2 0 ph b/ UE   bridges        clamshell        SLR        SL hip abd                                Ther Activity                        Gait Training        No AD        uneve surfaces        Modalities                             "

## 2023-04-09 ENCOUNTER — HOSPITAL ENCOUNTER (INPATIENT)
Facility: HOSPITAL | Age: 73
LOS: 3 days | Discharge: HOME | DRG: 247 | End: 2023-04-12
Attending: INTERNAL MEDICINE | Admitting: INTERNAL MEDICINE
Payer: COMMERCIAL

## 2023-04-09 DIAGNOSIS — I21.02 ST ELEVATION MYOCARDIAL INFARCTION INVOLVING LEFT ANTERIOR DESCENDING (LAD) CORONARY ARTERY (HCC): ICD-10-CM

## 2023-04-09 DIAGNOSIS — I25.10 CORONARY ARTERY DISEASE INVOLVING NATIVE CORONARY ARTERY OF NATIVE HEART WITHOUT ANGINA PECTORIS: Primary | Chronic | ICD-10-CM

## 2023-04-09 DIAGNOSIS — N18.4 STAGE 4 CHRONIC KIDNEY DISEASE (HCC): ICD-10-CM

## 2023-04-09 DIAGNOSIS — I21.3 STEMI (ST ELEVATION MYOCARDIAL INFARCTION) (HCC): ICD-10-CM

## 2023-04-09 DIAGNOSIS — I25.5 ISCHEMIC CARDIOMYOPATHY: ICD-10-CM

## 2023-04-09 DIAGNOSIS — N18.9 CHRONIC KIDNEY DISEASE, UNSPECIFIED CKD STAGE: ICD-10-CM

## 2023-04-09 PROCEDURE — 84484 ASSAY OF TROPONIN QUANT: CPT | Performed by: INTERNAL MEDICINE

## 2023-04-09 PROCEDURE — C1769 GUIDE WIRE: HCPCS | Performed by: INTERNAL MEDICINE

## 2023-04-09 PROCEDURE — B2111ZZ FLUOROSCOPY OF MULTIPLE CORONARY ARTERIES USING LOW OSMOLAR CONTRAST: ICD-10-PCS | Performed by: INTERNAL MEDICINE

## 2023-04-09 PROCEDURE — 94660 CPAP INITIATION&MGMT: CPT

## 2023-04-09 PROCEDURE — C9606 PERC D-E COR REVASC W AMI S: HCPCS | Performed by: INTERNAL MEDICINE

## 2023-04-09 PROCEDURE — NC001 PR NO CHARGE: Performed by: INTERNAL MEDICINE

## 2023-04-09 PROCEDURE — 92941 PRQ TRLML REVSC TOT OCCL AMI: CPT | Performed by: INTERNAL MEDICINE

## 2023-04-09 PROCEDURE — 85347 COAGULATION TIME ACTIVATED: CPT

## 2023-04-09 PROCEDURE — C1874 STENT, COATED/COV W/DEL SYS: HCPCS | Performed by: INTERNAL MEDICINE

## 2023-04-09 PROCEDURE — 93005 ELECTROCARDIOGRAM TRACING: CPT

## 2023-04-09 PROCEDURE — 93454 CORONARY ARTERY ANGIO S&I: CPT | Performed by: INTERNAL MEDICINE

## 2023-04-09 PROCEDURE — 99152 MOD SED SAME PHYS/QHP 5/>YRS: CPT | Performed by: INTERNAL MEDICINE

## 2023-04-09 PROCEDURE — C1725 CATH, TRANSLUMIN NON-LASER: HCPCS | Performed by: INTERNAL MEDICINE

## 2023-04-09 PROCEDURE — C1894 INTRO/SHEATH, NON-LASER: HCPCS | Performed by: INTERNAL MEDICINE

## 2023-04-09 PROCEDURE — C1887 CATHETER, GUIDING: HCPCS | Performed by: INTERNAL MEDICINE

## 2023-04-09 PROCEDURE — 027035Z DILATION OF CORONARY ARTERY, ONE ARTERY WITH TWO DRUG-ELUTING INTRALUMINAL DEVICES, PERCUTANEOUS APPROACH: ICD-10-PCS | Performed by: INTERNAL MEDICINE

## 2023-04-09 PROCEDURE — 94760 N-INVAS EAR/PLS OXIMETRY 1: CPT

## 2023-04-09 PROCEDURE — 99153 MOD SED SAME PHYS/QHP EA: CPT | Performed by: INTERNAL MEDICINE

## 2023-04-09 DEVICE — IMPLANTABLE DEVICE
Type: IMPLANTABLE DEVICE | Status: FUNCTIONAL
Brand: ORSIRO MISSION

## 2023-04-09 RX ORDER — ACETAMINOPHEN 325 MG/1
650 TABLET ORAL EVERY 6 HOURS PRN
Status: DISCONTINUED | OUTPATIENT
Start: 2023-04-09 | End: 2023-04-12 | Stop reason: HOSPADM

## 2023-04-09 RX ORDER — MIDAZOLAM HYDROCHLORIDE 2 MG/2ML
INJECTION, SOLUTION INTRAMUSCULAR; INTRAVENOUS CODE/TRAUMA/SEDATION MEDICATION
Status: DISCONTINUED | OUTPATIENT
Start: 2023-04-09 | End: 2023-04-09 | Stop reason: HOSPADM

## 2023-04-09 RX ORDER — FENTANYL CITRATE 50 UG/ML
INJECTION, SOLUTION INTRAMUSCULAR; INTRAVENOUS CODE/TRAUMA/SEDATION MEDICATION
Status: DISCONTINUED | OUTPATIENT
Start: 2023-04-09 | End: 2023-04-09 | Stop reason: HOSPADM

## 2023-04-09 RX ORDER — NITROGLYCERIN 20 MG/100ML
INJECTION INTRAVENOUS CODE/TRAUMA/SEDATION MEDICATION
Status: DISCONTINUED | OUTPATIENT
Start: 2023-04-09 | End: 2023-04-09 | Stop reason: HOSPADM

## 2023-04-09 RX ORDER — HEPARIN SODIUM 1000 [USP'U]/ML
INJECTION, SOLUTION INTRAVENOUS; SUBCUTANEOUS CODE/TRAUMA/SEDATION MEDICATION
Status: DISCONTINUED | OUTPATIENT
Start: 2023-04-09 | End: 2023-04-09 | Stop reason: HOSPADM

## 2023-04-09 RX ORDER — DULOXETIN HYDROCHLORIDE 60 MG/1
60 CAPSULE, DELAYED RELEASE ORAL DAILY
Status: DISCONTINUED | OUTPATIENT
Start: 2023-04-10 | End: 2023-04-12 | Stop reason: HOSPADM

## 2023-04-09 RX ORDER — METOPROLOL SUCCINATE 100 MG/1
100 TABLET, EXTENDED RELEASE ORAL DAILY
Status: DISCONTINUED | OUTPATIENT
Start: 2023-04-10 | End: 2023-04-12 | Stop reason: HOSPADM

## 2023-04-09 RX ORDER — DOXAZOSIN MESYLATE 1 MG/1
1 TABLET ORAL
Status: DISCONTINUED | OUTPATIENT
Start: 2023-04-09 | End: 2023-04-12 | Stop reason: HOSPADM

## 2023-04-09 RX ORDER — WARFARIN SODIUM 5 MG/1
5 TABLET ORAL
Status: DISCONTINUED | OUTPATIENT
Start: 2023-04-10 | End: 2023-04-12 | Stop reason: HOSPADM

## 2023-04-09 RX ORDER — AMLODIPINE BESYLATE 5 MG/1
5 TABLET ORAL DAILY
Status: DISCONTINUED | OUTPATIENT
Start: 2023-04-10 | End: 2023-04-10

## 2023-04-09 RX ORDER — PREDNISOLONE/MOXIFLOX/BROMFEN 1 %-0.5 %
1 SUSPENSION, DROPS(FINAL DOSAGE FORM)(ML) OPHTHALMIC (EYE) 4 TIMES DAILY
Status: DISCONTINUED | OUTPATIENT
Start: 2023-04-10 | End: 2023-04-10

## 2023-04-09 RX ORDER — GABAPENTIN 300 MG/1
300 CAPSULE ORAL DAILY
Status: DISCONTINUED | OUTPATIENT
Start: 2023-04-10 | End: 2023-04-12 | Stop reason: HOSPADM

## 2023-04-09 RX ORDER — VERAPAMIL HYDROCHLORIDE 2.5 MG/ML
INJECTION, SOLUTION INTRAVENOUS CODE/TRAUMA/SEDATION MEDICATION
Status: DISCONTINUED | OUTPATIENT
Start: 2023-04-09 | End: 2023-04-09 | Stop reason: HOSPADM

## 2023-04-09 RX ORDER — ATORVASTATIN CALCIUM 80 MG/1
80 TABLET, FILM COATED ORAL EVERY EVENING
Status: DISCONTINUED | OUTPATIENT
Start: 2023-04-09 | End: 2023-04-12 | Stop reason: HOSPADM

## 2023-04-09 RX ORDER — SODIUM CHLORIDE 9 MG/ML
100 INJECTION, SOLUTION INTRAVENOUS CONTINUOUS
Status: DISCONTINUED | OUTPATIENT
Start: 2023-04-09 | End: 2023-04-10

## 2023-04-09 RX ORDER — TAMSULOSIN HYDROCHLORIDE 0.4 MG/1
0.4 CAPSULE ORAL
Status: DISCONTINUED | OUTPATIENT
Start: 2023-04-10 | End: 2023-04-12 | Stop reason: HOSPADM

## 2023-04-09 RX ORDER — LIDOCAINE HYDROCHLORIDE 10 MG/ML
INJECTION, SOLUTION EPIDURAL; INFILTRATION; INTRACAUDAL; PERINEURAL CODE/TRAUMA/SEDATION MEDICATION
Status: DISCONTINUED | OUTPATIENT
Start: 2023-04-09 | End: 2023-04-09 | Stop reason: HOSPADM

## 2023-04-09 RX ORDER — SODIUM CHLORIDE 9 MG/ML
INJECTION, SOLUTION INTRAVENOUS
Status: COMPLETED | OUTPATIENT
Start: 2023-04-09 | End: 2023-04-09

## 2023-04-09 RX ADMIN — SODIUM CHLORIDE 169.5 ML: 0.9 INJECTION, SOLUTION INTRAVENOUS at 22:33

## 2023-04-09 RX ADMIN — DOXAZOSIN 1 MG: 1 TABLET ORAL at 22:34

## 2023-04-09 RX ADMIN — SODIUM CHLORIDE 100 ML/HR: 0.9 INJECTION, SOLUTION INTRAVENOUS at 22:34

## 2023-04-09 RX ADMIN — ATORVASTATIN CALCIUM 80 MG: 80 TABLET, FILM COATED ORAL at 22:34

## 2023-04-10 ENCOUNTER — APPOINTMENT (INPATIENT)
Dept: NON INVASIVE DIAGNOSTICS | Facility: HOSPITAL | Age: 73
DRG: 247 | End: 2023-04-10
Payer: COMMERCIAL

## 2023-04-10 LAB
ANION GAP SERPL CALCULATED.3IONS-SCNC: 4 MMOL/L (ref 4–13)
AORTIC ROOT: 3.6 CM
AORTIC VALVE MEAN VELOCITY: 16.7 M/S
ASCENDING AORTA: 3.6 CM
ATRIAL RATE: 110 BPM
ATRIAL RATE: 122 BPM
AV AREA BY CONTINUOUS VTI: 1 CM2
AV AREA PEAK VELOCITY: 1.1 CM2
AV LVOT MEAN GRADIENT: 1 MMHG
AV LVOT PEAK GRADIENT: 2 MMHG
AV MEAN GRADIENT: 11 MMHG
AV PEAK GRADIENT: 19 MMHG
AV REGURGITATION PRESSURE HALF TIME: 380 MS
AV VALVE AREA: 1.1 CM2
AV VELOCITY RATIO: 0.33
BASOPHILS # BLD AUTO: 0.04 THOUSANDS/ÂΜL (ref 0–0.1)
BASOPHILS NFR BLD AUTO: 0 % (ref 0–1)
BUN SERPL-MCNC: 34 MG/DL (ref 5–25)
CALCIUM SERPL-MCNC: 9.1 MG/DL (ref 8.3–10.1)
CARDIAC TROPONIN I PNL SERPL HS: ABNORMAL NG/L
CARDIAC TROPONIN I PNL SERPL HS: ABNORMAL NG/L
CHLORIDE SERPL-SCNC: 108 MMOL/L (ref 96–108)
CO2 SERPL-SCNC: 26 MMOL/L (ref 21–32)
CREAT SERPL-MCNC: 2.43 MG/DL (ref 0.6–1.3)
DOP CALC AO PEAK VEL: 2.2 M/S
DOP CALC AO VTI: 47 CM
DOP CALC LVOT AREA: 3.46 CM2
DOP CALC LVOT DIAMETER: 2.1 CM
DOP CALC LVOT PEAK VEL VTI: 14.93 CM
DOP CALC LVOT PEAK VEL: 0.73 M/S
DOP CALC LVOT STROKE INDEX: 21.6 ML/M2
DOP CALC LVOT STROKE VOLUME: 51.69 CM3
E WAVE DECELERATION TIME: 176 MS
EOSINOPHIL # BLD AUTO: 0.04 THOUSAND/ÂΜL (ref 0–0.61)
EOSINOPHIL NFR BLD AUTO: 0 % (ref 0–6)
ERYTHROCYTE [DISTWIDTH] IN BLOOD BY AUTOMATED COUNT: 14 % (ref 11.6–15.1)
FRACTIONAL SHORTENING: 22 % (ref 28–44)
GFR SERPL CREATININE-BSD FRML MDRD: 25 ML/MIN/1.73SQ M
GLOBAL LONGITUIDAL STRAIN: -6 %
GLUCOSE SERPL-MCNC: 104 MG/DL (ref 65–140)
GLUCOSE SERPL-MCNC: 109 MG/DL (ref 65–140)
GLUCOSE SERPL-MCNC: 114 MG/DL (ref 65–140)
GLUCOSE SERPL-MCNC: 120 MG/DL (ref 65–140)
GLUCOSE SERPL-MCNC: 126 MG/DL (ref 65–140)
HCT VFR BLD AUTO: 28.7 % (ref 36.5–49.3)
HGB BLD-MCNC: 9.3 G/DL (ref 12–17)
IMM GRANULOCYTES # BLD AUTO: 0.03 THOUSAND/UL (ref 0–0.2)
IMM GRANULOCYTES NFR BLD AUTO: 0 % (ref 0–2)
INR PPP: 3.15 (ref 0.84–1.19)
INTERVENTRICULAR SEPTUM IN DIASTOLE (PARASTERNAL SHORT AXIS VIEW): 1.5 CM
INTERVENTRICULAR SEPTUM: 1.5 CM (ref 0.6–1.1)
KCT BLD-ACNC: 313 SEC (ref 89–137)
KCT BLD-ACNC: 377 SEC (ref 89–137)
LAAS-AP2: 31.2 CM2
LAAS-AP4: 25.5 CM2
LEFT ATRIUM SIZE: 4.6 CM
LEFT INTERNAL DIMENSION IN SYSTOLE: 4.2 CM (ref 2.1–4)
LEFT VENTRICLE DIASTOLIC VOLUME (MOD BIPLANE): 154 ML
LEFT VENTRICLE SYSTOLIC VOLUME (MOD BIPLANE): 81 ML
LEFT VENTRICULAR INTERNAL DIMENSION IN DIASTOLE: 5.4 CM (ref 3.5–6)
LEFT VENTRICULAR POSTERIOR WALL IN END DIASTOLE: 1.5 CM
LEFT VENTRICULAR STROKE VOLUME: 64 ML
LV EF: 47 %
LVSV (TEICH): 64 ML
LYMPHOCYTES # BLD AUTO: 1.2 THOUSANDS/ÂΜL (ref 0.6–4.47)
LYMPHOCYTES NFR BLD AUTO: 14 % (ref 14–44)
MCH RBC QN AUTO: 29.8 PG (ref 26.8–34.3)
MCHC RBC AUTO-ENTMCNC: 32.4 G/DL (ref 31.4–37.4)
MCV RBC AUTO: 92 FL (ref 82–98)
MONOCYTES # BLD AUTO: 0.74 THOUSAND/ÂΜL (ref 0.17–1.22)
MONOCYTES NFR BLD AUTO: 8 % (ref 4–12)
MV E'TISSUE VEL-SEP: 5 CM/S
MV PEAK A VEL: 0.44 M/S
MV PEAK E VEL: 82 CM/S
MV STENOSIS PRESSURE HALF TIME: 51 MS
MV VALVE AREA P 1/2 METHOD: 4.31 CM2
NEUTROPHILS # BLD AUTO: 6.86 THOUSANDS/ÂΜL (ref 1.85–7.62)
NEUTS SEG NFR BLD AUTO: 78 % (ref 43–75)
NRBC BLD AUTO-RTO: 0 /100 WBCS
PLATELET # BLD AUTO: 199 THOUSANDS/UL (ref 149–390)
PMV BLD AUTO: 10.2 FL (ref 8.9–12.7)
POTASSIUM SERPL-SCNC: 3.8 MMOL/L (ref 3.5–5.3)
PROTHROMBIN TIME: 32.6 SECONDS (ref 11.6–14.5)
QRS AXIS: 100 DEGREES
QRS AXIS: 107 DEGREES
QRSD INTERVAL: 162 MS
QRSD INTERVAL: 170 MS
QT INTERVAL: 342 MS
QT INTERVAL: 396 MS
QTC INTERVAL: 436 MS
QTC INTERVAL: 518 MS
RA PRESSURE ESTIMATED: 8 MMHG
RBC # BLD AUTO: 3.12 MILLION/UL (ref 3.88–5.62)
RIGHT ATRIUM AREA SYSTOLE A4C: 23.5 CM2
RIGHT VENTRICLE ID DIMENSION: 5.2 CM
RV PSP: 30 MMHG
SL CV AV DECELERATION TIME RETROGRADE: 1311 MS
SL CV AV PEAK GRADIENT RETROGRADE: 29 MMHG
SL CV LEFT ATRIUM LENGTH A2C: 6.7 CM
SL CV LV EF: 35
SL CV PED ECHO LEFT VENTRICLE DIASTOLIC VOLUME (MOD BIPLANE) 2D: 143 ML
SL CV PED ECHO LEFT VENTRICLE SYSTOLIC VOLUME (MOD BIPLANE) 2D: 79 ML
SODIUM SERPL-SCNC: 138 MMOL/L (ref 135–147)
SPECIMEN SOURCE: ABNORMAL
SPECIMEN SOURCE: ABNORMAL
T WAVE AXIS: -18 DEGREES
T WAVE AXIS: -34 DEGREES
TR MAX PG: 22 MMHG
TR PEAK VELOCITY: 2.4 M/S
TRICUSPID ANNULAR PLANE SYSTOLIC EXCURSION: 2.5 CM
TRICUSPID VALVE PEAK REGURGITATION VELOCITY: 2.36 M/S
VENTRICULAR RATE: 103 BPM
VENTRICULAR RATE: 98 BPM
WBC # BLD AUTO: 8.91 THOUSAND/UL (ref 4.31–10.16)

## 2023-04-10 PROCEDURE — 93010 ELECTROCARDIOGRAM REPORT: CPT | Performed by: INTERNAL MEDICINE

## 2023-04-10 PROCEDURE — 80048 BASIC METABOLIC PNL TOTAL CA: CPT | Performed by: INTERNAL MEDICINE

## 2023-04-10 PROCEDURE — 99233 SBSQ HOSP IP/OBS HIGH 50: CPT | Performed by: INTERNAL MEDICINE

## 2023-04-10 PROCEDURE — 85025 COMPLETE CBC W/AUTO DIFF WBC: CPT | Performed by: INTERNAL MEDICINE

## 2023-04-10 PROCEDURE — 84484 ASSAY OF TROPONIN QUANT: CPT | Performed by: INTERNAL MEDICINE

## 2023-04-10 PROCEDURE — 94760 N-INVAS EAR/PLS OXIMETRY 1: CPT

## 2023-04-10 PROCEDURE — 82948 REAGENT STRIP/BLOOD GLUCOSE: CPT

## 2023-04-10 PROCEDURE — 93306 TTE W/DOPPLER COMPLETE: CPT | Performed by: INTERNAL MEDICINE

## 2023-04-10 PROCEDURE — 85610 PROTHROMBIN TIME: CPT | Performed by: INTERNAL MEDICINE

## 2023-04-10 PROCEDURE — 93306 TTE W/DOPPLER COMPLETE: CPT

## 2023-04-10 RX ORDER — TORSEMIDE 20 MG/1
20 TABLET ORAL DAILY
Status: DISCONTINUED | OUTPATIENT
Start: 2023-04-10 | End: 2023-04-10

## 2023-04-10 RX ORDER — LISINOPRIL 2.5 MG/1
2.5 TABLET ORAL DAILY
Status: DISCONTINUED | OUTPATIENT
Start: 2023-04-10 | End: 2023-04-12 | Stop reason: HOSPADM

## 2023-04-10 RX ORDER — DOCUSATE SODIUM 100 MG/1
100 CAPSULE, LIQUID FILLED ORAL 2 TIMES DAILY
Status: DISCONTINUED | OUTPATIENT
Start: 2023-04-10 | End: 2023-04-10

## 2023-04-10 RX ORDER — ONDANSETRON 2 MG/ML
4 INJECTION INTRAMUSCULAR; INTRAVENOUS ONCE
Status: COMPLETED | OUTPATIENT
Start: 2023-04-10 | End: 2023-04-10

## 2023-04-10 RX ORDER — SENNOSIDES 8.6 MG
1 TABLET ORAL
Status: DISCONTINUED | OUTPATIENT
Start: 2023-04-10 | End: 2023-04-12 | Stop reason: HOSPADM

## 2023-04-10 RX ORDER — PREDNISOLONE/MOXIFLOX/BROMFEN 1 %-0.5 %
1 SUSPENSION, DROPS(FINAL DOSAGE FORM)(ML) OPHTHALMIC (EYE) 4 TIMES DAILY
Status: DISCONTINUED | OUTPATIENT
Start: 2023-04-10 | End: 2023-04-12 | Stop reason: HOSPADM

## 2023-04-10 RX ORDER — DOCUSATE SODIUM 100 MG/1
100 CAPSULE, LIQUID FILLED ORAL 2 TIMES DAILY
Status: DISCONTINUED | OUTPATIENT
Start: 2023-04-10 | End: 2023-04-12 | Stop reason: HOSPADM

## 2023-04-10 RX ORDER — TORSEMIDE 20 MG/1
20 TABLET ORAL DAILY
Status: DISCONTINUED | OUTPATIENT
Start: 2023-04-10 | End: 2023-04-12 | Stop reason: HOSPADM

## 2023-04-10 RX ORDER — SENNOSIDES 8.6 MG
1 TABLET ORAL
Status: DISCONTINUED | OUTPATIENT
Start: 2023-04-10 | End: 2023-04-10

## 2023-04-10 RX ADMIN — Medication 1 DROP: at 17:31

## 2023-04-10 RX ADMIN — TAMSULOSIN HYDROCHLORIDE 0.4 MG: 0.4 CAPSULE ORAL at 15:34

## 2023-04-10 RX ADMIN — Medication 1 DROP: at 08:19

## 2023-04-10 RX ADMIN — TORSEMIDE 20 MG: 20 TABLET ORAL at 15:34

## 2023-04-10 RX ADMIN — METOPROLOL SUCCINATE 100 MG: 100 TABLET, EXTENDED RELEASE ORAL at 08:16

## 2023-04-10 RX ADMIN — ACETAMINOPHEN 650 MG: 325 TABLET ORAL at 00:12

## 2023-04-10 RX ADMIN — ATORVASTATIN CALCIUM 80 MG: 80 TABLET, FILM COATED ORAL at 17:30

## 2023-04-10 RX ADMIN — Medication 1 DROP: at 12:11

## 2023-04-10 RX ADMIN — DULOXETINE HYDROCHLORIDE 60 MG: 60 CAPSULE, DELAYED RELEASE ORAL at 08:16

## 2023-04-10 RX ADMIN — WARFARIN SODIUM 5 MG: 5 TABLET ORAL at 17:30

## 2023-04-10 RX ADMIN — AMLODIPINE BESYLATE 5 MG: 5 TABLET ORAL at 08:16

## 2023-04-10 RX ADMIN — GABAPENTIN 300 MG: 300 CAPSULE ORAL at 08:16

## 2023-04-10 RX ADMIN — ONDANSETRON 4 MG: 2 INJECTION INTRAMUSCULAR; INTRAVENOUS at 13:19

## 2023-04-10 RX ADMIN — Medication 1 DROP: at 23:02

## 2023-04-10 RX ADMIN — DOCUSATE SODIUM 100 MG: 100 CAPSULE, LIQUID FILLED ORAL at 17:30

## 2023-04-10 RX ADMIN — LISINOPRIL 2.5 MG: 2.5 TABLET ORAL at 15:34

## 2023-04-10 RX ADMIN — SENNOSIDES 8.6 MG: 8.6 TABLET, FILM COATED ORAL at 21:31

## 2023-04-10 NOTE — PROGRESS NOTES
"Cardiology Progress Note - Sahil Grubbs 68 y o  male MRN: [id number]    [facility] Encounter: [id number]    Hospital Problems:  Active Problems:    * No active hospital problems  *      Assessment & Plan:    1  STEMI  - s/p PCI x2 to the prox LAD - mid LAD  - residual 80% RCA lesion -- can monitor and medically manage for now  - patient on long term warfarin, continue antiplatelet monotherapy with clopidogrel 75 mg daily  - continue atorvastatin 80 mg daily    2  PAF - previously on flecainide, now discontinued due to #1  - continue metoprolol for rate control  - continue warfarin for thromboembolic ppx -- also has Factor V Leiden deficiency    3  Cardiomyopathy - new, ischemic s/p STEMI, LVEF 30-35%  - continue metoprolol XL  - add lisinopril 2 5 mg daily, BP is borderline will stop amlodipine  - can consider additional GDMT including Jardiance prior to discharge  - Life Vest -- will need re-eval in 45 days to determine if AICD is required    4  HFpEF - now combined systolic & diastolic CHF  -mildly hypervolemic, resume home torsemide 20 mg daily    5  HTN  - continue metoprolol XL, torsemide  - stop amlodipine, start lisinopril as above    6  Moderate AS  - will continue outpatient monitoring    7  UFQ0N-1  - creatinine at baseline  - continue torsemide       Subjective:   Patient seen and examined  No new complaints    Objective:     Vitals: Blood pressure 109/67, pulse 61, temperature (!) 97 4 °F (36 3 °C), temperature source Oral, resp  rate 16, height 5' 11\" (1 803 m), weight 113 kg (250 lb), SpO2 98 %  , Body mass index is 34 87 kg/m² ,   Orthostatic Blood Pressures    Flowsheet Row Most Recent Value   Blood Pressure 109/67 filed at 04/10/2023 1041   Patient Position - Orthostatic VS Lying filed at 04/10/2023 1041            Intake/Output Summary (Last 24 hours) at 4/10/2023 1447  Last data filed at 4/10/2023 0716  Gross per 24 hour   Intake 662 83 ml   Output 1500 ml   Net -837 17 ml       No " significant arrhythmias seen on telemetry review  Physical Exam:    General: Hubert Karimi is a well appearing male, in no acute distress, sitting comfortably  HEENT: moist mucous membranes, EOMI  Neck:  No JVD, supple, trachea midline  Cardiovascular: unremarkable S1/S2, regular rate and rhythm, 2/6 SM  Pulmonary: normal respiratory effort, CTAB  Abdomen: soft and nondistended  Extremities: No lower extremity edema  Warm and well perfused extremities    Neuro: no focal motor deficits, AAOx3 (person, place, time)  Psych: Normal mood and affect, cooperative      Medications:      Current Facility-Administered Medications:   •  acetaminophen (TYLENOL) tablet 650 mg, 650 mg, Oral, Q6H PRN, Mira Roberts MD, 650 mg at 04/10/23 0012  •  amLODIPine (NORVASC) tablet 5 mg, 5 mg, Oral, Daily, Mira Roberts MD, 5 mg at 04/10/23 0816  •  atorvastatin (LIPITOR) tablet 80 mg, 80 mg, Oral, QPM, Mira Roberts MD, 80 mg at 04/09/23 2234  •  docusate sodium (COLACE) capsule 100 mg, 100 mg, Oral, BID, David Falcon MD  •  doxazosin (CARDURA) tablet 1 mg, 1 mg, Oral, HS, Mira Roberts MD, 1 mg at 04/09/23 2234  •  DULoxetine (CYMBALTA) delayed release capsule 60 mg, 60 mg, Oral, Daily, Mira Roberts MD, 60 mg at 04/10/23 0816  •  gabapentin (NEURONTIN) capsule 300 mg, 300 mg, Oral, Daily, Mira Roberts MD, 300 mg at 04/10/23 6429  •  metoprolol succinate (TOPROL-XL) 24 hr tablet 100 mg, 100 mg, Oral, Daily, Mira Roberts MD, 100 mg at 04/10/23 0816  •  Prednisol Ace-Moxiflox-Bromfen 1-0 5-0 075 % SUSP 1 drop, 1 drop, Right Eye, 4x Daily, Damien Knight MD, 1 drop at 04/10/23 1211  •  senna (SENOKOT) tablet 8 6 mg, 1 tablet, Oral, HS, Fadia Lock MD  •  tamsulosin (FLOMAX) capsule 0 4 mg, 0 4 mg, Oral, Daily With Jaylon Ray MD  •  warfarin (COUMADIN) tablet 5 mg, 5 mg, Oral, Daily (warfarin), Mira Roberts MD     Labs & Results:        Results "from last 7 days   Lab Units 04/10/23  0500 04/09/23  1902   WBC Thousand/uL 8 91 7 00   HEMOGLOBIN g/dL 9 3* 10 7*   HEMATOCRIT % 28 7* 33 2*   PLATELETS Thousands/uL 199 223     Results from last 7 days   Lab Units 04/09/23  1902   TRIGLYCERIDES mg/dL 278*   HDL mg/dL 45     Results from last 7 days   Lab Units 04/10/23  0500 04/09/23  1902   POTASSIUM mmol/L 3 8 3 4*   CHLORIDE mmol/L 108 101   CO2 mmol/L 26 28   BUN mg/dL 34* 39*   CREATININE mg/dL 2 43* 2 59*   CALCIUM mg/dL 9 1 9 9   ALK PHOS U/L  --  78   ALT U/L  --  9   AST U/L  --  16     Results from last 7 days   Lab Units 04/10/23  0500 04/09/23  1902   INR  3 15* 2 85*   PTT seconds  --  38*     Results from last 7 days   Lab Units 04/09/23  1902   MAGNESIUM mg/dL 2 1            This note was completed in part utilizing M*Modal fluency direct voice recognition software  Grammatical errors, random word insertion, spelling mistakes, occasional wrong word or \"sound-alike\" substitutions and incomplete sentences may be an occasional consequence of the system secondary to software limitations, ambient noise and hardware issues  At the time of dictation, efforts were made to edit, clarify and /or correct errors  Please read the chart carefully and recognize, using context, where substitutions have occurred  If you have any questions or concerns about the context, text or information contained within the body of this dictation, please contact myself, the provider, for further clarification      "

## 2023-04-10 NOTE — H&P
Cardiology Team 1 STEMI Inpatient - History & Physical  Jenni Hanna 68 y o  male MRN: [id number]  Unit/Bed#:  Encounter: [id number]      PCP: Isacc Morrow MD  Outpatient Cardiologist: Dr Lidia Mijares    STEMI  C: prox and mid LAD lesions, s/p balloon angioplasty + ROBERT X 2  Initial troponin 83  Presented with chest pain x 2 hours  Risk factors: HTN, HLD, BMI 35  Loaded with aspirin and brilinta prior to transfer    Paroxysmal atrial fibrillation  Meds: flecainide (dose decreased recently given worsening CKD), toprol xl  AC on warfarin  He had a Aflutter ablation in the past  Currently in afib    RBBB  Noted in prior ekgs    Diastolic heart failure  Last echo April 2022: EF 55%, G2DD  PTA: torsemide 20 mg daily  Currently appears mildly volume up    CKD  Baseline creatinine 2 5-2 8, currently at baseline    Factor V leiden deficiency with DVT of lower extremities  On warfarin    HTN  Home meds: amlodipine 5 mg daily, toprol xl 100 mg daily    HLD  LDL calculated 128    Mild AS    Impaired glucose  HBA1C 6 3 in May 2022      Plan  1  Will start plavix with warfarin (history of Factor V leiden deficiency with DVT)  2  Atorvastatin 80 mg daily  3  Continue toprol xl 100 mg daily  4  Hold flecainide  5  Can start ACE/ARB prior to discharge  6  2D echo  7  Will hydrate with n saline given CKD  8  Nephrology consult if creatinine worsens by morning  9  Check INR  10  Accuchecks    History of Present Illness     HPI:  Jenni Hanna is a 68 y o  male who with a history of paroxysmal atrial fibrillation on flecainide, toprol xl, and warfarin, atrial flutter s/p ablation, RBBB, diastolic heart failure, worsening CKD, factor V leiden deficiency with DVT, HTN, HLD, who presented initially to [facility] with  Chest pain for about 2 hours, central, radiating to shoulder and jaw  EKG shows marked ST elevations in leads I, aVL, II,III, aVF, V1-V6 with RBBB   STEMI alert has been activated and patient was transferred to [facility] for emergent [facility]       Pertinent labs:  Hs-trop 0HR  83  Cr 2 59, K 3 4  Hgb 10 7, Plt 223   calculated, A1c 6 3 in May 2022      EKG:           Historical Information   Past Medical History:   Diagnosis Date   • Acute venous embolism and thrombosis of deep vessels of proximal lower extremity (HCC)     Last assessed: 5/18/15   • Arthritis    • Cellulitis     LE   • Chronic kidney disease 3/2020    Acute Kidney Injury   • CPAP (continuous positive airway pressure) dependence    • Factor V Leiden ([facility] [address] )    • Forgetfulness    • Gross hematuria 5/17/2022   • Headache(784 0) 3/2022    Never till cervical  spine surgery   • Heart murmur 3/2020    Told when in hospital   • [facility] (hard of hearing)    • Hypoalbuminemia 5/11/2022   • Other acute osteomyelitis, other site ([facility] [address] ) 1/3/2023   • Paroxysmal atrial fibrillation ([facility] [address] )     Last assessed: 11/2/15   • Sleep apnea      Past Surgical History:   Procedure Laterality Date   • BACK SURGERY      Lower   • CATARACT EXTRACTION     • COLON SURGERY     • COLONOSCOPY     • INCISION AND DRAINAGE POSTERIOR SPINE N/A 04/01/2022    Procedure: Posterior cervical evacuation of postoperative collection and debridement with placement of drains C3-T1; Surgeon: Skylar Kim MD;  Location[facility];  Service: Neurosurgery   • IR BIOPSY KIDNEY RANDOM  05/26/2022   • IR TUNNELED DIALYSIS CATHETER PLACEMENT  05/23/2022   • IR TUNNELED DIALYSIS CATHETER REMOVAL  06/02/2022   • VT ARTHRD ANT INTERBODY  [address] CRV BELOW C2 N/A 03/11/2022    Procedure: Anterior cervical discectomy and fixation fusion C5/6 and C6/7; Posterior cervical decompression and instrumented fusion C3-T1;   Surgeon: Skylar Kim MD;  Location: [facility];  Service: Neurosurgery   • SPINE SURGERY  03/11/2022    Cervical myelopathy/ cervical fusion     Social History   Social History     Substance and Sexual Activity   Alcohol Use Not Currently   • Alcohol/week: 0 0 standard drinks     Social History Substance and Sexual Activity   Drug Use No     Social History     Tobacco Use   Smoking Status Never   Smokeless Tobacco Never     Family History:   Family History   Problem Relation Age of Onset   • Lung cancer Mother    • Hearing loss Father    • Heart attack Brother    • Atrial fibrillation Brother    • Emphysema Sister        Meds/Allergies   all medications and allergies reviewed  Allergies   Allergen Reactions   • Morphine GI Intolerance   • Vitamin K Other (See Comments)     On coumadin-patient sensitive to vitamin K amounts in meds etc        Objective   Vitals: There were no vitals taken for this visit  No intake or output data in the 24 hours ending 04/09/23 2018    Invasive Devices     Peripheral Intravenous Line  Duration           Peripheral IV 04/09/23 Distal;Left;Upper;Ventral (anterior) Arm <1 day    Peripheral IV 04/09/23 Right Antecubital <1 day                Review of Systems:  Review of Systems   Unable to perform ROS: Acuity of condition       Physical Exam  GEN: Antonia Prior appears well, alert and oriented x 3, pleasant and cooperative   HEENT:  Normocephalic, atraumatic, anicteric, moist mucous membranes  NECK: no JVD, no carotid bruits   HEART: regular rhythm, normal rate, normal S1 and S2, no murmurs, clicks, gallops or rubs   LUNGS: mild B/L rales  ABDOMEN:  Normoactive bowel sounds, soft, no tenderness, no distention  EXTREMITIES: peripheral pulses palpable; no edema  NEURO: no gross focal findings; cranial nerves grossly intact   SKIN:  Dry, intact, warm to touch  ACCESS: right femoral      Lab Results: I have personally reviewed pertinent lab results      Results from last 7 days   Lab Units 04/09/23  1902   HS TNI 0HR ng/L 83*         Results from last 7 days   Lab Units 04/09/23  1902   POTASSIUM mmol/L 3 4*   CO2 mmol/L 28   CHLORIDE mmol/L 101   BUN mg/dL 39*   CREATININE mg/dL 2 59*     Results from last 7 days   Lab Units 04/09/23  1902   HEMOGLOBIN g/dL 10 7* HEMATOCRIT % 33 2*   PLATELETS Thousands/uL 223     Results from last 7 days   Lab Units 23  1902   TRIGLYCERIDES mg/dL 278*   HDL mg/dL 45   LDL CALC mg/dL 128*         Imaging: I have personally reviewed pertinent reports  Previous Cath/PCI:  No results found for this or any previous visit  No results found for this or any previous visit  No results found for this or any previous visit  No results found for this or any previous visit  Previous STRESS TEST:  No results found for this or any previous visit  Results for orders placed during the hospital encounter of 18    NM myocardial perfusion spect (stress and/or rest)    [place] [address] [address]    Stress Gated SPECT Myocardial Perfusion Imaging after Exercise    Patient: Georgie Santos  MR number: [id number]  Account number: [de-identified]  : 1950  Age: 80 years  Gender: Male  Status: Outpatient  Location: [address] [place] [address]  Height: 72 in  Weight: 258 lb  BP: 116/ 93 mmHg    Allergies: MORPHINE    Diagnosis: I48 1 - Atrial flutter    Primary Physician:  Junie Rick MD  RN:  Juana Bautista  Referring Physician:  Reza Guevara MD  Technician:  Nitza Sifuentes:  [facility]  Interpreting Physician:  Reza Guevara MD    HISTORY: Factor V Leiden, Ablation, WILL, Cardioversion  The patient is a 80year old  male  Chest pain status: no chest pain  Coronary artery disease risk factors: dyslipidemia and family history of premature coronary artery  disease  Cardiovascular history: arrhythmia  Co-morbidity: obesity  Previous test results: abnormal ECG  PHYSICAL EXAM: Baseline physical exam screening: no wheezes audible  REST ECG: Atrial fibrillation  PROCEDURE: The study was performed in the the [address] [place] [address]   Treadmill exercise testing was performed, using the Nirmal protocol  Gated SPECT myocardial perfusion imaging was performed during stress  Systolic blood  pressure was 116 mmHg, at the start of the study  Diastolic blood pressure was 93 mmHg, at the start of the study  The heart rate was 87 bpm, at the start of the study  IV double checked  ALYX PROTOCOL:  HR bpm SBP mmHg DBP mmHg Symptoms  Baseline 87 116 93 none  Stage 1 120 100 71 leg pain  Stage 2 144 144 72 fatigue, leg pain  Recovery 1 137 174 93 subsiding  Recovery 2 136 176 99 subsiding  Recovery 3 136 148 97 none  Recovery 5 115 137 99 none  No medications or fluids given  STRESS SUMMARY: 02 sat 97% baseline, 97% peak  Duration of exercise was 6 min and 0 sec  The patient exercised to protocol stage 2  Maximal work rate was 7 METs  Functional capacity was slightly decreased (20% to 30%)  Maximal heart rate  during stress was 164 bpm ( 107 % of maximal predicted heart rate)  The heart rate response to stress was normal  There was normal resting blood pressure with an appropriate response to stress  The rate-pressure product for the peak heart  rate and blood pressure was 37593  There was no chest pain during stress  The stress test was terminated due to leg pain and fatigue  The stress ECG was negative for ischemia and normal  Arrhythmia during stress: Persistence of atrial  fibrillation throughout the study  ISOTOPE ADMINISTRATION:  Resting isotope administration Stress isotope administration  Agent Tetrofosmin Tetrofosmin  Dose 15 8 mCi 48 2 mCi  Date 09/21/2018 09/21/2018  Injection-image interval 44 min 41 min    The radiopharmaceutical was injected one minute before the end of exercise  MYOCARDIAL PERFUSION IMAGING:  The image quality was good  Rotating projection images reveal mild diaphragmatic attenuation  Left ventricular size was normal  The TID ratio was 0 88  PERFUSION DEFECTS:  -  There were no significant perfusion defects      PERFUSION QUANTITATION:  The summed perfusion score measured 0 during stress  GATED SPECT:  The calculated left ventricular ejection fraction was 61 %  Left ventricular ejection fraction was within normal limits by visual estimate  There was no left ventricular regional abnormality  SUMMARY:  -  Stress results: Duration of exercise was 6 min and 0 sec  Maximal work rate was 7 METs  Functional capacity was slightly decreased (20% to 30%)  Target heart rate was achieved  There was no chest pain during stress  -  ECG conclusions: The stress ECG was negative for ischemia and normal  Arrhythmia during stress: Persistence of atrial fibrillation throughout the study  -  Perfusion imaging: There were no significant perfusion defects   -  Gated SPECT: The calculated left ventricular ejection fraction was 61 %  Left ventricular ejection fraction was within normal limits by visual estimate  There was no left ventricular regional abnormality  IMPRESSIONS: Normal study after maximal exercise  Myocardial perfusion imaging was normal at rest and with stress  Left ventricular systolic function was normal     Prepared and signed by    Loulou Grewal MD  Signed 09/21/2018 13:46:16    No results found for this or any previous visit  ECHO:  No results found for this or any previous visit  Results for orders placed during the hospital encounter of 04/22/22    Echo complete w/ contrast if indicated    Interpretation Summary  •  Left Ventricle: Left ventricular cavity size is mildly dilated  Wall thickness is mildly increased  The left ventricular ejection fraction is 55%  Systolic function is normal  Wall motion is normal  Diastolic function is moderately abnormal, consistent with grade II (pseudonormal) relaxation  •  Right Ventricle: Right ventricular cavity size is mildly dilated  •  Left Atrium: The atrium is mildly dilated  •  Right Atrium: The atrium is mildly dilated  •  Aortic Valve: The aortic valve is trileaflet  The leaflets are not thickened   The leaflets are moderately calcified  There is mildly reduced mobility  There is mild stenosis  The aortic valve velocity is increased due to stenosis  •  Mitral Valve: There is mild regurgitation  •  Tricuspid Valve: There is mild regurgitation  DIANA:  No results found for this or any previous visit  No results found for this or any previous visit  CMR:  No results found for this or any previous visit  No results found for this or any previous visit  No results found for this or any previous visit  Code Status: level 1 full code      Case discussed and reviewed with Dr Tyson Richmond who agrees with my assessment and plan  Andreina Coates MD  Cardiology Fellow  PGY-4    ==========================================================================================      Epic/ Allscripts/Care Everywhere records reviewed: Yes    ** Please Note: Fluency DirectDictation voice to text software may have been used in the creation of this document   **

## 2023-04-10 NOTE — RESTORATIVE TECHNICIAN NOTE
Restorative Technician Note      Patient Name: Parish Lopez     Note Type: Mobility  Patient Position Upon Consult: Supine  Activity Performed: Ambulated  Assistive Device: Roller walker  Patient Position at End of Consult: Bedside chair;  All needs within reach

## 2023-04-10 NOTE — UTILIZATION REVIEW
Initial Clinical Review    Admission: Date/Time/Statement:   Admission Orders (From admission, onward)     Ordered        04/09/23 2029  Inpatient Admission  Once                      Orders Placed This Encounter   Procedures   • Inpatient Admission     Standing Status:   Standing     Number of Occurrences:   1     Order Specific Question:   Level of Care     Answer:   Level 1 Stepdown [13]     Order Specific Question:   Estimated length of stay     Answer:   More than 2 Midnights     Order Specific Question:   Certification     Answer:   I certify that inpatient services are medically necessary for this patient for a duration of greater than two midnights  See H&P and MD Progress Notes for additional information about the patient's course of treatment  ED Arrival Information     Patient not seen in ED                       Initial Presentation: 68 y o  male, transfer from [facility] ED initially presented for Chest pain for approx  2 hrs,  central, radiating to shoulder and jaw  EKG shows marked ST elevations in leads I, aVL, II,III, aVF, V1-V6 with RBBB  STEMI alert has been activated  Transferred for emergent [facility]  PMH for  paroxysmal atrial fibrillation on flecainide, toprol xl, and warfarin, atrial flutter s/p ablation, RBBB, diastolic heart failure, worsening CKD, factor V leiden deficiency with DVT, HTN, HLD  Inpatient level of care for STEMI, RBBB  [facility]: prox and mid LAD lesions, s/p balloon angioplasty + ROBERT X 2  Start Plavix with Warfarin (history of Factor V leiden deficiency with DVT)  Statin daily  Continue Toprol x1 100 mg daily  Hold flecainide  Echo  IVFs given CKD  Nephrology consult  Check INR   Start ACE/ARB prior to d/c      4/9 S/p Cardiac Cath;  Impression  Successful PCI w/ non-overlapping ROBERT x2 of sequential prox & mid LAD culprit lesions  Residual mid RCA 80% diffuse lesion    Recommendation  Clopidogrel monotherapy + warfarin, intensify statin rx, aggressive risk factor medications  Echo, cardiac rehab on d/c    Date: 4/10  Day 2:  Progress notes; HFpEF - now combined systolic and diastolic CHF  Mild hypervolemic, resume home torsemide daily  Add lisinopril 2 5 mg daily, BP is borderline will stop amlodipine  Continue metoprolol  Continue antiplatelet monotherapy with clopidogrel 75 mg daily  Life Vest -- will need re-eval in 45 days to determine if AICD is required  Can consider additional GDMT including Jardiance prior to discharge    Vitals   Temperature Pulse Respirations Blood Pressure SpO2   04/10/23 0257 04/09/23 2151 04/09/23 2151 04/09/23 2151 04/09/23 2151   98 °F (36 7 °C) 94 20 114/72 94 %      Temp Source Heart Rate Source Patient Position - Orthostatic VS BP Location FiO2 (%)   04/09/23 2151 04/10/23 0716 04/09/23 2151 04/10/23 0257 --   Oral Monitor Lying Left arm       Pain Score       04/09/23 2022       No Pain          Wt Readings from Last 1 Encounters:   04/10/23 113 kg (250 lb)     Additional Vital Signs:   04/10/23 1041 97 4 °F (36 3 °C)   Abnormal  61 16 109/67 79 98 % -- -- None (Room air) -- Lying   04/10/23 0815 -- 75 -- 112/72 -- -- -- -- -- -- --   04/10/23 0800 -- -- -- -- -- -- -- -- None (Room air) -- --   04/10/23 0716 97 7 °F (36 5 °C) 69 16 112/72 85 97 % -- -- None (Room air) --      Pertinent Labs/Diagnostic Test Results:   4/9  CTA dissection protocol chest/abd/pelvis - 1  No acute aortic dissection  2   Moderate hiatal hernia  PCXR - No acute cardiopulmonary disease        Results from last 7 days   Lab Units 04/10/23  0500 04/09/23  1902   WBC Thousand/uL 8 91 7 00   HEMOGLOBIN g/dL 9 3* 10 7*   HEMATOCRIT % 28 7* 33 2*   PLATELETS Thousands/uL 199 223   NEUTROS ABS Thousands/µL 6 86 4 97         Results from last 7 days   Lab Units 04/10/23  0500 04/09/23  1902   SODIUM mmol/L 138 139   POTASSIUM mmol/L 3 8 3 4*   CHLORIDE mmol/L 108 101   CO2 mmol/L 26 28   ANION GAP mmol/L 4 10   BUN mg/dL 34* 39*   CREATININE mg/dL 2 43* 2 59*   EGFR ml/min/1 73sq m 25 23   CALCIUM mg/dL 9 1 9 9   MAGNESIUM mg/dL  --  2 1     Results from last 7 days   Lab Units 04/09/23  1902   AST U/L 16   ALT U/L 9   ALK PHOS U/L 78   TOTAL PROTEIN g/dL 7 9   ALBUMIN g/dL 4 3   TOTAL BILIRUBIN mg/dL 0 40     Results from last 7 days   Lab Units 04/10/23  1049 04/10/23  0724   POC GLUCOSE mg/dl 109 126     Results from last 7 days   Lab Units 04/10/23  0500 04/09/23  1902   GLUCOSE RANDOM mg/dL 120 181*         Results from last 7 days   Lab Units 04/09/23  1902   HEMOGLOBIN A1C % 6 2*   EAG mg/dl 131       Results from last 7 days   Lab Units 04/10/23  0500 04/09/23  2357 04/09/23  1902   HS TNI 0HR ng/L  --  >22,973* 83*   HS TNI 4HR ng/L >22,973*  --   --          Results from last 7 days   Lab Units 04/10/23  0500 04/09/23  1902   PROTIME seconds 32 6* 31 3*   INR  3 15* 2 85*   PTT seconds  --  38*         Past Medical History:   Diagnosis Date   • Acute venous embolism and thrombosis of deep vessels of proximal lower extremity (HCC)     Last assessed: 5/18/15   • Arthritis    • Cellulitis     LE   • Chronic kidney disease 3/2020    Acute Kidney Injury   • CPAP (continuous positive airway pressure) dependence    • Factor V Leiden ([facility] )    • Forgetfulness    • Gross hematuria 5/17/2022   • Headache(784 0) 3/2022    Never till cervical  spine surgery   • Heart murmur 3/2020    Told when in hospital   • PATRICIA [facility] (hard of hearing)    • Hypoalbuminemia 5/11/2022   • Other acute osteomyelitis, other site ([facility] ) 1/3/2023   • Paroxysmal atrial fibrillation (HCC)     Last assessed: 11/2/15   • Sleep apnea      Present on Admission:  **None**      Admitting Diagnosis: STEMI (ST elevation myocardial infarction) ([facility] ) [I21 3]  Age/Sex: 68 y o  male     Admission Orders:  Scheduled Medications:  amLODIPine, 5 mg, Oral, Daily  atorvastatin, 80 mg, Oral, QPM  doxazosin, 1 mg, Oral, HS  DULoxetine, 60 mg, Oral, Daily  gabapentin, 300 mg, Oral, Daily  metoprolol succinate, 100 mg, Oral, Daily  Prednisol Ace-Moxiflox-Bromfen, 1 drop, Right Eye, 4x Daily  tamsulosin, 0 4 mg, Oral, Daily With Dinner  warfarin, 5 mg, Oral, Daily (warfarin)      Continuous IV Infusions:     PRN Meds:  acetaminophen, 650 mg, Oral, Q6H PRN 4/10 x1        None    Network Utilization Review Department  ATTENTION: Please call with any questions or concerns to [phone] and carefully listen to the prompts so that you are directed to the right person  All voicemails are confidential   Evelyn Flynn all requests for admission clinical reviews, approved or denied determinations and any other requests to dedicated fax number below belonging to the campus where the patient is receiving treatment   List of dedicated fax numbers for the Facilities:  [address] DENIALS (Administrative/Medical Necessity) [phone]   [address] (Maternity/NICU/Pediatrics) [phone]   [address] [phone]   [place] [address] [phone]   [address] [place] [address] [id number] [place] [address] 28 [phone]   1556 [facility] [address] [phone]

## 2023-04-11 ENCOUNTER — APPOINTMENT (OUTPATIENT)
Dept: PHYSICAL THERAPY | Facility: CLINIC | Age: 73
End: 2023-04-11

## 2023-04-11 LAB
ANION GAP SERPL CALCULATED.3IONS-SCNC: 6 MMOL/L (ref 4–13)
BUN SERPL-MCNC: 32 MG/DL (ref 5–25)
CALCIUM SERPL-MCNC: 9.1 MG/DL (ref 8.3–10.1)
CHLORIDE SERPL-SCNC: 104 MMOL/L (ref 96–108)
CO2 SERPL-SCNC: 27 MMOL/L (ref 21–32)
CREAT SERPL-MCNC: 2.6 MG/DL (ref 0.6–1.3)
GFR SERPL CREATININE-BSD FRML MDRD: 23 ML/MIN/1.73SQ M
GLUCOSE SERPL-MCNC: 111 MG/DL (ref 65–140)
GLUCOSE SERPL-MCNC: 113 MG/DL (ref 65–140)
GLUCOSE SERPL-MCNC: 117 MG/DL (ref 65–140)
GLUCOSE SERPL-MCNC: 119 MG/DL (ref 65–140)
GLUCOSE SERPL-MCNC: 98 MG/DL (ref 65–140)
INR PPP: 2.8 (ref 0.84–1.19)
POTASSIUM SERPL-SCNC: 3.7 MMOL/L (ref 3.5–5.3)
PROTHROMBIN TIME: 29.7 SECONDS (ref 11.6–14.5)
SODIUM SERPL-SCNC: 137 MMOL/L (ref 135–147)

## 2023-04-11 PROCEDURE — 99233 SBSQ HOSP IP/OBS HIGH 50: CPT | Performed by: INTERNAL MEDICINE

## 2023-04-11 PROCEDURE — 82948 REAGENT STRIP/BLOOD GLUCOSE: CPT

## 2023-04-11 PROCEDURE — 80048 BASIC METABOLIC PNL TOTAL CA: CPT | Performed by: INTERNAL MEDICINE

## 2023-04-11 PROCEDURE — 85610 PROTHROMBIN TIME: CPT | Performed by: INTERNAL MEDICINE

## 2023-04-11 RX ORDER — POLYETHYLENE GLYCOL 3350 17 G/17G
17 POWDER, FOR SOLUTION ORAL DAILY PRN
Status: DISCONTINUED | OUTPATIENT
Start: 2023-04-11 | End: 2023-04-12 | Stop reason: HOSPADM

## 2023-04-11 RX ADMIN — Medication 1 DROP: at 12:02

## 2023-04-11 RX ADMIN — ATORVASTATIN CALCIUM 80 MG: 80 TABLET, FILM COATED ORAL at 17:41

## 2023-04-11 RX ADMIN — DOCUSATE SODIUM 100 MG: 100 CAPSULE, LIQUID FILLED ORAL at 17:42

## 2023-04-11 RX ADMIN — Medication 1 DROP: at 21:10

## 2023-04-11 RX ADMIN — TORSEMIDE 20 MG: 20 TABLET ORAL at 08:49

## 2023-04-11 RX ADMIN — TAMSULOSIN HYDROCHLORIDE 0.4 MG: 0.4 CAPSULE ORAL at 17:42

## 2023-04-11 RX ADMIN — Medication 1 DROP: at 17:42

## 2023-04-11 RX ADMIN — GABAPENTIN 300 MG: 300 CAPSULE ORAL at 08:49

## 2023-04-11 RX ADMIN — SENNOSIDES 8.6 MG: 8.6 TABLET, FILM COATED ORAL at 21:09

## 2023-04-11 RX ADMIN — WARFARIN SODIUM 5 MG: 5 TABLET ORAL at 17:42

## 2023-04-11 RX ADMIN — Medication 1 DROP: at 08:49

## 2023-04-11 RX ADMIN — METOPROLOL SUCCINATE 100 MG: 100 TABLET, EXTENDED RELEASE ORAL at 08:48

## 2023-04-11 RX ADMIN — POLYETHYLENE GLYCOL 3350 17 G: 17 POWDER, FOR SOLUTION ORAL at 20:30

## 2023-04-11 RX ADMIN — DOCUSATE SODIUM 100 MG: 100 CAPSULE, LIQUID FILLED ORAL at 08:48

## 2023-04-11 RX ADMIN — DULOXETINE HYDROCHLORIDE 60 MG: 60 CAPSULE, DELAYED RELEASE ORAL at 08:48

## 2023-04-11 RX ADMIN — LISINOPRIL 2.5 MG: 2.5 TABLET ORAL at 08:48

## 2023-04-11 NOTE — CASE MANAGEMENT
Case Management Discharge Planning Note    Patient name Parish Lopez  Location [address] [place] [address] 508/[facility] 945-56 MRN [id number]  : 1950 Date 2023       Current Admission Date: 2023  Current Admission Diagnosis:STEMI (ST elevation myocardial infarction) [facility])   Patient Active Problem List    Diagnosis Date Noted   • Anemia due to stage 4 chronic kidney disease ([facility] ) 2023   • Peripheral vascular disease, unspecified ([place] [address] ) 2023   • Obesity, morbid ([place] [address] ) 2022   • Other spondylosis with myelopathy, cervical region 2022   • Balance disorder 2022   • At high risk for injury related to fall 2022   • Chronic pain of both knees    • Anticoagulated on warfarin 10/11/2022   • 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 2022   • Chronic diastolic (congestive) heart failure ([place] [address] ) 05/10/2022   • Microscopic hematuria 2022   • Urinary frequency 2022   • Glomerulonephritis    • Other proteinuria    • Ambulatory dysfunction 2022   • JANEL (acute kidney injury) ([place] [address] ) 2022   • Great toe pain, right 04/10/2022   • Surgical site infection 2022   • S/P cervical spinal fusion 2022   • Anemia 2022   • Head pain cephalgia 2022   • Muscle spasm 2022   • Goals of care, counseling/discussion 2022   • Sinus bradycardia 03/10/2022   • Cervical myelopathy ([facility] ) 2022   • Pes anserinus bursitis of right knee 2021   • IFG (impaired fasting glucose) 2021   • History of DVT of lower extremity 2021   • Mixed hyperlipidemia 10/27/2020   • Paroxysmal A-fib ([facility] ) 2020   • Lower extremity edema 2020   • Primary osteoarthritis of left knee 2020   • Primary osteoarthritis of right knee 2020   • Chronic kidney disease 2020   • Insomnia 03/10/2017   • Lumbar herniated disc 03/10/2017   • Erectile dysfunction 2016   • Status post catheter ablation of atrial flutter 2015   • WILL (obstructive sleep apnea) 01/09/2015   • Factor V Leiden mutation ([address] ) 01/03/2014      LOS (days): 2  Geometric Mean LOS (GMLOS) (days):   Days to GMLOS:     OBJECTIVE:  Risk of Unplanned Readmission Score: 24 45         Current admission status: Inpatient   Preferred Pharmacy:   [facility] ([address] [facility], [place]  [address] [address]  Phone: [phone] Fax: [phone]    [id number] [place], [place] - [address] [place] [address]  Phone: [phone] Fax: [phone]    [facility] ([facility] ) - [address],[facility]  [address]  Phone: [phone] Fax: [phone]    Primary Care Provider: Jenniffer Vickers MD    Primary Insurance: Devan Link [facility]  Secondary Insurance:     DISCHARGE DETAILS:    Per MD Lock pt will DC with a lift vest  Life vest form is completed  CM faxed clinicals to Nic Fitch

## 2023-04-11 NOTE — PLAN OF CARE
Problem: MOBILITY - ADULT  Goal: Maintain or return to baseline ADL function  Description: INTERVENTIONS:  -  Assess patient's ability to carry out ADLs; assess patient's baseline for ADL function and identify physical deficits which impact ability to perform ADLs (bathing, care of mouth/teeth, toileting, grooming, dressing, etc )  - Assess/evaluate cause of self-care deficits   - Assess range of motion  - Assess patient's mobility; develop plan if impaired  - Assess patient's need for assistive devices and provide as appropriate  - Encourage maximum independence but intervene and supervise when necessary  - Involve family in performance of ADLs  - Assess for home care needs following discharge   - Consider OT consult to assist with ADL evaluation and planning for discharge  - Provide patient education as appropriate  4/11/2023 1114 by Shaji Conner RN  Outcome: Progressing  4/11/2023 0531 by Shaji Conner RN  Outcome: Progressing  Goal: Maintains/Returns to pre admission functional level  Description: INTERVENTIONS:  - Perform BMAT or MOVE assessment daily    - Set and communicate daily mobility goal to care team and patient/family/caregiver  - Collaborate with rehabilitation services on mobility goals if consulted  - Perform Range of Motion times a day  - Reposition patient every  hours    - Dangle patient  times a day  - Stand patient  times a day  - Ambulate patient  times a day  - Out of bed to chair  times a day   - Out of bed for meals  times a day  - Out of bed for toileting  - Record patient progress and toleration of activity level   4/11/2023 1114 by Shaji Conner RN  Outcome: Progressing  4/11/2023 0531 by Shaji Conner RN  Outcome: Progressing

## 2023-04-11 NOTE — PROGRESS NOTES
"  Cardiology Progress Note - Kurt Hill 68 y o  male MRN: [id number]    [facility] Encounter: [id number]    Hospital Problems:  Active Problems:    * No active hospital problems  *      Assessment & Plan:    1  STEMI  - s/p PCI x2 to the prox LAD - mid LAD  - residual 80% RCA lesion -- can monitor and medically manage for now  - patient on long term warfarin, continue antiplatelet monotherapy with clopidogrel 75 mg daily  - continue atorvastatin 80 mg daily    2  PAF - previously on flecainide, now discontinued due to #1  - continue metoprolol for rate control  - continue warfarin for thromboembolic ppx -- also has Factor V Leiden deficiency    3  Cardiomyopathy - new, ischemic s/p STEMI, LVEF 30-35%  - continue metoprolol XL  - add lisinopril 2 5 mg daily, BP is borderline will stop amlodipine  - can consider additional GDMT including Jardiance prior to discharge, presently limited by BP  - Life Vest -- will need re-eval in 45 days to determine if AICD is required    4  HFpEF - now combined systolic & diastolic CHF  -continue home torsemide 20 mg daily    5  HTN  - continue metoprolol XL, torsemide  - stop amlodipine, start lisinopril as above    6  Moderate AS  - will continue outpatient monitoring    7  [id number]  - creatinine at baseline  - continue torsemide       Subjective:   Patient seen and examined  No new complaints      Objective:     Vitals: Blood pressure 114/56, pulse 67, temperature 99 1 °F (37 3 °C), temperature source Axillary, resp  rate 19, height 5' 11\" (1 803 m), weight 113 kg (250 lb), SpO2 94 %  , Body mass index is 34 87 kg/m² ,   Orthostatic Blood Pressures    Flowsheet Row Most Recent Value   Blood Pressure 114/56 filed at 04/11/2023 0848   Patient Position - Orthostatic VS Lying filed at 04/11/2023 0606            Intake/Output Summary (Last 24 hours) at 4/11/2023 1103  Last data filed at 4/11/2023 1001  Gross per 24 hour   Intake 2370 ml   Output 750 ml   Net 1620 ml " Tele review: occasional NSVT up to 5 beats, otherwise SR      Physical Exam:    General: Sanya Prado is a well appearing male, in no acute distress, sitting comfortably  HEENT: moist mucous membranes, EOMI  Neck:  No JVD, supple, trachea midline  Cardiovascular: unremarkable S1/S2, regular rate and rhythm, 2/6 SM  Pulmonary: normal respiratory effort, CTAB  Abdomen: soft and nondistended  Extremities: No lower extremity edema  Warm and well perfused extremities    Neuro: no focal motor deficits, AAOx3 (person, place, time)  Psych: Normal mood and affect, cooperative        Medications:      Current Facility-Administered Medications:   •  acetaminophen (TYLENOL) tablet 650 mg, 650 mg, Oral, Q6H PRN, Thompson Monroy MD, 650 mg at 04/10/23 0012  •  atorvastatin (LIPITOR) tablet 80 mg, 80 mg, Oral, QPM, Thompson Monroy MD, 80 mg at 04/10/23 1730  •  docusate sodium (COLACE) capsule 100 mg, 100 mg, Oral, BID, Selene Miller MD, 100 mg at 04/11/23 0848  •  doxazosin (CARDURA) tablet 1 mg, 1 mg, Oral, HS, Thompson Monroy MD, 1 mg at 04/09/23 2234  •  DULoxetine (CYMBALTA) delayed release capsule 60 mg, 60 mg, Oral, Daily, Thompson Monroy MD, 60 mg at 04/11/23 0848  •  gabapentin (NEURONTIN) capsule 300 mg, 300 mg, Oral, Daily, Thompson Monroy MD, 300 mg at 04/11/23 0849  •  lisinopril (ZESTRIL) tablet 2 5 mg, 2 5 mg, Oral, Daily, Selene Miller MD, 2 5 mg at 04/11/23 0848  •  metoprolol succinate (TOPROL-XL) 24 hr tablet 100 mg, 100 mg, Oral, Daily, Thompson Monroy MD, 100 mg at 04/11/23 0848  •  Prednisol Ace-Moxiflox-Bromfen 1-0 5-0 075 % SUSP 1 drop, 1 drop, Right Eye, 4x Daily, Daniel Curry MD, 1 drop at 04/11/23 0849  •  senna (SENOKOT) tablet 8 6 mg, 1 tablet, Oral, HS, Selene Miller MD, 8 6 mg at 04/10/23 2131  •  tamsulosin (FLOMAX) capsule 0 4 mg, 0 4 mg, Oral, Daily With Dinner, Thompson Monroy MD, 0 4 mg at 04/10/23 1534  •  torsemide (DEMADEX) tablet "20 mg, 20 mg, Oral, Daily, Ayaz Lock MD, 20 mg at 04/11/23 0849  •  warfarin (COUMADIN) tablet 5 mg, 5 mg, Oral, Daily (warfarin), Brian Valiente MD, 5 mg at 04/10/23 1730     Labs & Results:        Results from last 7 days   Lab Units 04/10/23  0500 04/09/23  1902   WBC Thousand/uL 8 91 7 00   HEMOGLOBIN g/dL 9 3* 10 7*   HEMATOCRIT % 28 7* 33 2*   PLATELETS Thousands/uL 199 223     Results from last 7 days   Lab Units 04/09/23  1902   TRIGLYCERIDES mg/dL 278*   HDL mg/dL 45     Results from last 7 days   Lab Units 04/11/23  0439 04/10/23  0500 04/09/23  1902   POTASSIUM mmol/L 3 7 3 8 3 4*   CHLORIDE mmol/L 104 108 101   CO2 mmol/L 27 26 28   BUN mg/dL 32* 34* 39*   CREATININE mg/dL 2 60* 2 43* 2 59*   CALCIUM mg/dL 9 1 9 1 9 9   ALK PHOS U/L  --   --  78   ALT U/L  --   --  9   AST U/L  --   --  16     Results from last 7 days   Lab Units 04/11/23  0439 04/10/23  0500 04/09/23  1902   INR  2 80* 3 15* 2 85*   PTT seconds  --   --  38*     Results from last 7 days   Lab Units 04/09/23  1902   MAGNESIUM mg/dL 2 1            This note was completed in part utilizing M*Modal fluency direct voice recognition software  Grammatical errors, random word insertion, spelling mistakes, occasional wrong word or \"sound-alike\" substitutions and incomplete sentences may be an occasional consequence of the system secondary to software limitations, ambient noise and hardware issues  At the time of dictation, efforts were made to edit, clarify and /or correct errors  Please read the chart carefully and recognize, using context, where substitutions have occurred  If you have any questions or concerns about the context, text or information contained within the body of this dictation, please contact myself, the provider, for further clarification      "

## 2023-04-11 NOTE — PLAN OF CARE
Problem: MOBILITY - ADULT  Goal: Maintain or return to baseline ADL function  Description: INTERVENTIONS:  -  Assess patient's ability to carry out ADLs; assess patient's baseline for ADL function and identify physical deficits which impact ability to perform ADLs (bathing, care of mouth/teeth, toileting, grooming, dressing, etc )  - Assess/evaluate cause of self-care deficits   - Assess range of motion  - Assess patient's mobility; develop plan if impaired  - Assess patient's need for assistive devices and provide as appropriate  - Encourage maximum independence but intervene and supervise when necessary  - Involve family in performance of ADLs  - Assess for home care needs following discharge   - Consider OT consult to assist with ADL evaluation and planning for discharge  - Provide patient education as appropriate  Outcome: Progressing  Goal: Maintains/Returns to pre admission functional level  Description: INTERVENTIONS:  - Perform BMAT or MOVE assessment daily    - Set and communicate daily mobility goal to care team and patient/family/caregiver  - Collaborate with rehabilitation services on mobility goals if consulted  - Perform Range of Motion times a day  - Reposition patient every  hours    - Dangle patient  times a day  - Stand patient  times a day  - Ambulate patient  times a day  - Out of bed to chair  times a day   - Out of bed for meals times a day  - Out of bed for toileting  - Record patient progress and toleration of activity level   Outcome: Progressing

## 2023-04-11 NOTE — RESTORATIVE TECHNICIAN NOTE
Restorative Technician Note      Patient Name: Arie Rodrigez     Note Type: Mobility  Patient Position Upon Consult: Supine  Activity Performed: Ambulated  Assistive Device: Roller walker  Patient Position at End of Consult: Bedside chair;  All needs within reach

## 2023-04-11 NOTE — CASE MANAGEMENT
Case Management Assessment    Patient name King [address]/PPHP 232-19 MRN [id number]  : 1950 Date 2023       Current Admission Date: 2023  Current Admission Diagnosis:STEMI (ST elevation myocardial infarction) [facility])   Patient Active Problem List    Diagnosis Date Noted   • Anemia due to stage 4 chronic kidney disease ([place] [address] ) 2023   • Peripheral vascular disease, unspecified ([place] [address] ) 2023   • Obesity, morbid ([place] [address] ) 2022   • Other spondylosis with myelopathy, cervical region 2022   • Balance disorder 2022   • At high risk for injury related to fall 2022   • Chronic pain of both knees    • Anticoagulated on warfarin 10/11/2022   • 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 2022   • Chronic diastolic (congestive) heart failure ([place] [address] ) 05/10/2022   • Microscopic hematuria 2022   • Urinary frequency 2022   • Glomerulonephritis    • Other proteinuria    • Ambulatory dysfunction 2022   • JANEL (acute kidney injury) ([place] [address] ) 2022   • Great toe pain, right 04/10/2022   • Surgical site infection 2022   • S/P cervical spinal fusion 2022   • Anemia 2022   • Head pain cephalgia 2022   • Muscle spasm 2022   • Goals of care, counseling/discussion 2022   • Sinus bradycardia 03/10/2022   • Cervical myelopathy ([place] [address] ) 2022   • Pes anserinus bursitis of right knee 2021   • IFG (impaired fasting glucose) 2021   • History of DVT of lower extremity 2021   • Mixed hyperlipidemia 10/27/2020   • Paroxysmal A-fib ([place] [address] ) 2020   • Lower extremity edema 2020   • Primary osteoarthritis of left knee 2020   • Primary osteoarthritis of right knee 2020   • Chronic kidney disease 2020   • Insomnia 03/10/2017   • Lumbar herniated disc 03/10/2017   • Erectile dysfunction 2016   • Status post catheter ablation of atrial flutter 2015   • WILL (obstructive sleep apnea) 01/09/2015   • Factor V Leiden mutation ([facility] [address] ) 01/03/2014      LOS (days): 2  Geometric Mean LOS (GMLOS) (days): 2 00  Days to GMLOS:0 1     OBJECTIVE:    Risk of Unplanned Readmission Score: 24 56         Current admission status: Inpatient       Preferred Pharmacy:   [facility] ([address] [facility], [address]  [address] [facility]  [address] [address]  Phone: [phone] Fax: [phone]    [id number], [address] [address]  Phone: [phone] Fax: [phone]    [facility] ([facility] ) - [address],[facility]  [address]  Phone: [phone] Fax: [phone]    Primary Care Provider: Elmo Dill MD    Primary Insurance: [facility]  Secondary Insurance:     ASSESSMENT:  [facility], [address] [place] [address] Representative - Spouse   Primary Phone: [phone] (Mobile)  Home Phone: [phone]                              Patient Information  Admitted from[de-identified] Other (comment) ([facility])  Mental Status: Alert  During Assessment patient was accompanied by: Not accompanied during assessment  Assessment information provided by[de-identified] Spouse  Primary Caregiver: Self  Support Systems: Spouse/significant other  Home entry access options   Select all that apply : Stairs  Number of steps to enter home : 5  Do the steps have railings?: Yes  Type of Current Residence: [address] [place] home  Upon entering residence, is there a bedroom on the main floor (no further steps)?: Yes  Upon entering residence, is there a bathroom on the main floor (no further steps)?: Yes  Homeless/housing insecurity resource given?: N/A  Living Arrangements: Lives w/ Spouse/significant other    Activities of Daily Living Prior to Admission  Functional Status: Independent  Completes ADLs independently?: Yes  Ambulates independently?: Yes  Does patient use assisted devices?: Yes  Assisted Devices (DME) used: Sophia Grate  Does patient currently own DME?: Yes  What DME does the patient currently own?: Jessica [employer], Shower [employer]  Does patient have a history of Outpatient Therapy (PT/OT)?: Yes (currently doing op PT with [facility])  Does the patient have a history of Short-Term Rehab?: No  Does patient have a history of HHC?: Yes  Does patient currently have [place] [address]?: No         Patient Information Continued  Income Source: Pension/assisted  Food insecurity resource given?: N/A  Does patient receive dialysis treatments?: No  Does patient have a history of substance abuse?: No  Does patient have a history of Mental Health Diagnosis?: No         Means of Transportation  Means of Transport to [facility][de-identified] Drives Self  Was application for public transport provided?: N/A

## 2023-04-12 VITALS
SYSTOLIC BLOOD PRESSURE: 101 MMHG | TEMPERATURE: 98 F | HEART RATE: 57 BPM | WEIGHT: 250 LBS | DIASTOLIC BLOOD PRESSURE: 55 MMHG | HEIGHT: 71 IN | OXYGEN SATURATION: 93 % | BODY MASS INDEX: 35 KG/M2 | RESPIRATION RATE: 20 BRPM

## 2023-04-12 PROBLEM — I21.02 ST ELEVATION MYOCARDIAL INFARCTION INVOLVING LEFT ANTERIOR DESCENDING (LAD) CORONARY ARTERY (HCC): Status: ACTIVE | Noted: 2023-04-12

## 2023-04-12 PROBLEM — I21.02 ST ELEVATION MYOCARDIAL INFARCTION INVOLVING LEFT ANTERIOR DESCENDING (LAD) CORONARY ARTERY (HCC): Status: RESOLVED | Noted: 2023-04-12 | Resolved: 2023-04-12

## 2023-04-12 PROBLEM — I25.10 CORONARY ARTERY DISEASE INVOLVING NATIVE CORONARY ARTERY OF NATIVE HEART WITHOUT ANGINA PECTORIS: Chronic | Status: ACTIVE | Noted: 2023-04-12

## 2023-04-12 PROBLEM — I25.5 ISCHEMIC CARDIOMYOPATHY: Status: ACTIVE | Noted: 2023-04-12

## 2023-04-12 LAB
ANION GAP SERPL CALCULATED.3IONS-SCNC: 7 MMOL/L (ref 4–13)
BUN SERPL-MCNC: 42 MG/DL (ref 5–25)
CALCIUM SERPL-MCNC: 8.9 MG/DL (ref 8.3–10.1)
CHLORIDE SERPL-SCNC: 103 MMOL/L (ref 96–108)
CO2 SERPL-SCNC: 25 MMOL/L (ref 21–32)
CREAT SERPL-MCNC: 3.07 MG/DL (ref 0.6–1.3)
GFR SERPL CREATININE-BSD FRML MDRD: 19 ML/MIN/1.73SQ M
GLUCOSE SERPL-MCNC: 97 MG/DL (ref 65–140)
INR PPP: 3.14 (ref 0.84–1.19)
POTASSIUM SERPL-SCNC: 3.4 MMOL/L (ref 3.5–5.3)
PROTHROMBIN TIME: 32.5 SECONDS (ref 11.6–14.5)
SODIUM SERPL-SCNC: 135 MMOL/L (ref 135–147)

## 2023-04-12 PROCEDURE — 97116 GAIT TRAINING THERAPY: CPT

## 2023-04-12 PROCEDURE — 85610 PROTHROMBIN TIME: CPT | Performed by: INTERNAL MEDICINE

## 2023-04-12 PROCEDURE — 99239 HOSP IP/OBS DSCHRG MGMT >30: CPT | Performed by: INTERNAL MEDICINE

## 2023-04-12 PROCEDURE — 80048 BASIC METABOLIC PNL TOTAL CA: CPT | Performed by: INTERNAL MEDICINE

## 2023-04-12 PROCEDURE — NC001 PR NO CHARGE: Performed by: INTERNAL MEDICINE

## 2023-04-12 PROCEDURE — 97163 PT EVAL HIGH COMPLEX 45 MIN: CPT

## 2023-04-12 RX ORDER — ATORVASTATIN CALCIUM 80 MG/1
80 TABLET, FILM COATED ORAL EVERY EVENING
Qty: 90 TABLET | Refills: 3 | Status: SHIPPED | OUTPATIENT
Start: 2023-04-12

## 2023-04-12 RX ORDER — POTASSIUM CHLORIDE 20 MEQ/1
20 TABLET, EXTENDED RELEASE ORAL DAILY
Qty: 30 TABLET | Refills: 0 | Status: SHIPPED | OUTPATIENT
Start: 2023-04-12 | End: 2023-04-19

## 2023-04-12 RX ORDER — POTASSIUM CHLORIDE 20 MEQ/1
40 TABLET, EXTENDED RELEASE ORAL ONCE
Status: COMPLETED | OUTPATIENT
Start: 2023-04-12 | End: 2023-04-12

## 2023-04-12 RX ORDER — CLOPIDOGREL BISULFATE 75 MG/1
600 TABLET ORAL ONCE
Status: COMPLETED | OUTPATIENT
Start: 2023-04-12 | End: 2023-04-12

## 2023-04-12 RX ORDER — CLOPIDOGREL BISULFATE 75 MG/1
75 TABLET ORAL DAILY
Qty: 90 TABLET | Refills: 3 | Status: SHIPPED | OUTPATIENT
Start: 2023-04-12

## 2023-04-12 RX ORDER — LISINOPRIL 2.5 MG/1
2.5 TABLET ORAL DAILY
Qty: 90 TABLET | Refills: 3 | Status: SHIPPED | OUTPATIENT
Start: 2023-04-13 | End: 2023-04-18

## 2023-04-12 RX ADMIN — TORSEMIDE 20 MG: 20 TABLET ORAL at 08:59

## 2023-04-12 RX ADMIN — GABAPENTIN 300 MG: 300 CAPSULE ORAL at 08:58

## 2023-04-12 RX ADMIN — CLOPIDOGREL BISULFATE 600 MG: 75 TABLET ORAL at 12:01

## 2023-04-12 RX ADMIN — POTASSIUM CHLORIDE 40 MEQ: 1500 TABLET, EXTENDED RELEASE ORAL at 13:30

## 2023-04-12 RX ADMIN — DULOXETINE HYDROCHLORIDE 60 MG: 60 CAPSULE, DELAYED RELEASE ORAL at 08:57

## 2023-04-12 RX ADMIN — Medication 1 DROP: at 08:58

## 2023-04-12 RX ADMIN — Medication 1 DROP: at 12:06

## 2023-04-12 RX ADMIN — DOCUSATE SODIUM 100 MG: 100 CAPSULE, LIQUID FILLED ORAL at 08:57

## 2023-04-12 RX ADMIN — METOPROLOL SUCCINATE 100 MG: 100 TABLET, EXTENDED RELEASE ORAL at 08:58

## 2023-04-12 RX ADMIN — LISINOPRIL 2.5 MG: 2.5 TABLET ORAL at 08:58

## 2023-04-12 NOTE — DISCHARGE SUMMARY
Discharge Summary - Monico Gonzales 68 y o  male MRN: [id number]    Unit/Bed#: [facility] 217-99 Encounter: [id number]    Admission Date:   Admission Orders (From admission, onward)     Ordered        04/09/23 2029  Inpatient Admission  Once                        Admitting Diagnosis: STEMI (ST elevation myocardial infarction) ([facility] [address] ) [I21 3]    HPI:     Markel Christianson a 68 y  o  male who with a history of paroxysmal atrial fibrillation on flecainide, toprol xl, and warfarin, atrial flutter s/p ablation, RBBB, diastolic heart failure, worsening CKD, factor V leiden deficiency with DVT, HTN, HLD, who presented initially to [facility] with  Chest pain for about 2 hours, central, radiating to shoulder and jaw  EKG shows marked ST elevations in leads I, aVL, II,III, aVF, V1-V6 with RBBB  STEMI alert has been activated and patient was transferred to [facility] for emergent [facility]  Procedures Performed:   Orders Placed This Encounter   Procedures   • Cardiac catheterization       Summary of Hospital Course:     Patient was admitted 4/9/23 for STEMI  Cardiac catheterization on 4/9/23 found prox to mid LAD 99% stenosis -- culprit s/p PCI x 2 (Prox [facility] ROBERT 3 5 x13 mm, mid [employer] ROBERT 3 0 x 40 mm)  Additionally there was non-culprit mid RCA 80% diffuse without intervention  There were no procedural complications  Echocardiogram on 4/10/23 showed reduced LVEF 30-35% with RWMA c/w presenting STEMI  There was mild ectopy on telemetry including 5 beat runs of NSVT  GDMT included metoprolol XL and low-dose lisinopril limited by low resting BP  LifeVest was ordered and provided to patient prior to discharge  Patient was monitored until 4/12/23 and discharged in stable condition           Significant Findings, Care, Treatment and Services Provided:     Cardiac Cath 4/9/23  Impression       •  Successful PCI w/ non-overlapping ROBERT x2 of sequential prox & mid LAD culprit lesions  •  Residual mid RCA 80% diffuse lesion       TTE 4/10/23 Interpretation Summary       •  Left Ventricle: Left ventricular cavity size is normal  Wall thickness is moderately increased  There is moderate concentric hypertrophy  The left ventricular ejection fraction is 30-35%  Global longitudinal strain is reduced at -6%  There is moderate global hypokinesis with severe hypokinesis of the mid anterior, mid anteroseptal, mid anterolateral, and all apical segments  There is regional variation of the inferior wall  Diastolic function is moderately abnormal, consistent with grade II (pseudonormal) relaxation  •  Right Ventricle: Wall motion is abnormal  There is severe hypokinesis of the apical free wall  •  Mitral Valve: There is mild regurgitation  •  Aortic Valve: There is moderate stenosis  The aortic valve velocity is increased due to stenosis but lower than expected due to the presence of decreased flow            Complications: None    Discharge Diagnosis:     1  CAD & STEMI  - s/p PCI x2 to the prox LAD - mid LAD  - residual 80% RCA lesion -- can monitor and medically manage for now  - continue metoprolol  mg daily  - patient on long term warfarin, continue antiplatelet monotherapy with clopidogrel 75 mg daily  - continue atorvastatin 80 mg daily upon d/c (PTA statin pravastatin 40 mg daily with last  mg/dL) -- goal LDL < 70 mg/dL, will need recheck in 3-4 months and consider PCSK9i if above goal     2  PAF - previously on flecainide, now discontinued due to #1  - continue metoprolol XL for rate control  If rhythm control needed in future would consider dofetilide or amiodarone     - continue warfarin for thromboembolic ppx -- also has Factor V Leiden deficiency     3  Cardiomyopathy - new, ischemic s/p STEMI, LVEF 30-35%  - continue metoprolol XL  - continue lisinopril 2 5 mg daily, BP is borderline will stop amlodipine  - can consider additional GDMT including Jardiance during follow-up, presently limited by BP  - Life Vest provided -- will need re-eval in 45 days to determine if AICD is required  - referral to cardiac rehab     4  HFpEF - now combined systolic & diastolic CHF  -continue home torsemide 20 mg daily     5  HTN  - continue metoprolol XL, torsemide, doxazosin  - stop amlodipine, start lisinopril as above     6  Moderate AS  - will continue outpatient monitoring     7  ONF9L-9  - creatinine at baseline  - continue torsemide       Medical Problems     Resolved Problems  Date Reviewed: 3/22/2023          Resolved    * (Principal) ST elevation myocardial infarction involving left anterior descending (LAD) coronary artery ([facility] [address] ) 4/12/2023     Resolved by  Geovanny Merrill MD          Condition at Discharge: good         Discharge instructions/Information to patient and family:   See after visit summary for information provided to patient and family  Provisions for Follow-Up Care:  See after visit summary for information related to follow-up care and any pertinent home health orders  PCP: Dilip Stinson MD    Disposition: Home    Planned Readmission: No      Discharge Statement   I spent 31 minutes discharging the patient  This time was spent on the day of discharge  I had direct contact with the patient on the day of discharge  Additional documentation is required if more than 30 minutes were spent on discharge  Discharge Medications:  See after visit summary for reconciled discharge medications provided to patient and family

## 2023-04-12 NOTE — PLAN OF CARE
Problem: PHYSICAL THERAPY ADULT  Goal: Performs mobility at highest level of function for planned discharge setting  See evaluation for individualized goals  Description:    Equipment Recommended: Eleazar Moreno       See flowsheet documentation for full assessment, interventions and recommendations  Note: Prognosis: Good     Assessment: Pt seen for high complexity PT evaluation due to decrease in functional mobility status compared to baseline  Pt with active PT eval/treat orders at this time  Pt is a 68 y o  M who presented to [address] [place] [address] as a transfer from [address] [place] [address] Road on 4/9/23 where he initially presented with chest pain  Pt dx with STEMI  Pt  has a past medical history of Acute venous embolism and thrombosis of deep vessels of proximal lower extremity ([facility] [address] ), Arthritis, Cellulitis, Chronic kidney disease (3/2020), CPAP (continuous positive airway pressure) dependence, Factor V Leiden ([facility] [address] ), Forgetfulness, Gross hematuria (5/17/2022), Headache(784 0) (3/2022), Heart murmur (3/2020), [place] (hard of hearing), Hypoalbuminemia (5/11/2022), Other acute osteomyelitis, other site [facility]) (1/3/2023), Paroxysmal atrial fibrillation ([facility] [address] ), and Sleep apnea  Pt resides with spouse in [facility] with 5[facility]  Pt requires supervision for all mobility at this time  Pt left upright in bedside chair with all needs in reach  Pt will benefit from skilled therapy in order to address current impairments and functional limitations  PT to DC pt as pt has no further acute skilled PT needs and recommending OPPT  The patient's AM-PAC Basic Mobility Inpatient Short Form Raw Score is 19  A Raw score of greater than 16 suggests the patient may benefit from discharge to home  Please also refer to the recommendation of the Physical Therapist for safe discharge planning  Barriers to Discharge: None     PT Discharge Recommendation: Home with outpatient rehabilitation    See flowsheet documentation for full assessment

## 2023-04-12 NOTE — PLAN OF CARE
Problem: MOBILITY - ADULT  Goal: Maintain or return to baseline ADL function  Description: INTERVENTIONS:  -  Assess patient's ability to carry out ADLs; assess patient's baseline for ADL function and identify physical deficits which impact ability to perform ADLs (bathing, care of mouth/teeth, toileting, grooming, dressing, etc )  - Assess/evaluate cause of self-care deficits   - Assess range of motion  - Assess patient's mobility; develop plan if impaired  - Assess patient's need for assistive devices and provide as appropriate  - Encourage maximum independence but intervene and supervise when necessary  - Involve family in performance of ADLs  - Assess for home care needs following discharge   - Consider OT consult to assist with ADL evaluation and planning for discharge  - Provide patient education as appropriate  Outcome: Progressing  Goal: Maintains/Returns to pre admission functional level  Description: INTERVENTIONS:  - Perform BMAT or MOVE assessment daily    - Set and communicate daily mobility goal to care team and patient/family/caregiver  - Collaborate with rehabilitation services on mobility goals if consulted  - Perform Range of Motion  times a day  - Reposition patient every  hours    - Dangle patient  times a day  - Stand patient  times a day  - Ambulate patient times a day  - Out of bed to chair  times a day   - Out of bed for meals times a day  - Out of bed for toileting  - Record patient progress and toleration of activity level   Outcome: Progressing     Problem: Prexisting or High Potential for Compromised Skin Integrity  Goal: Skin integrity is maintained or improved  Description: INTERVENTIONS:  - Identify patients at risk for skin breakdown  - Assess and monitor skin integrity  - Assess and monitor nutrition and hydration status  - Monitor labs   - Assess for incontinence   - Turn and reposition patient  - Assist with mobility/ambulation  - Relieve pressure over bony prominences  - Avoid friction and shearing  - Provide appropriate hygiene as needed including keeping skin clean and dry  - Evaluate need for skin moisturizer/barrier cream  - Collaborate with interdisciplinary team   - Patient/family teaching  - Consider wound care consult   Outcome: Progressing

## 2023-04-12 NOTE — PROGRESS NOTES
"  Cardiology Progress Note - Brandon Morneo 68 y o  male MRN: [id number]    Unit/Bed#: [facility] 508-01 Encounter: [id number]    Hospital Problems:  Active Problems:    * No active hospital problems  *      Assessment & Plan:    1  CAD & STEMI  - s/p PCI x2 to the prox LAD - mid LAD  - residual 80% RCA lesion -- can monitor and medically manage for now  - continue metoprolol  mg daily  - patient on long term warfarin, continue antiplatelet monotherapy with clopidogrel 75 mg daily  - continue atorvastatin 80 mg daily upon d/c (PTA statin pravastatin 40 mg daily with last  mg/dL) -- goal LDL < 70 mg/dL, will need recheck in 3-4 months and consider PCSK9i if above goal    2  PAF - previously on flecainide, now discontinued due to #1  - continue metoprolol XL for rate control  If rhythm control needed in future would consider dofetilide or amiodarone  - continue warfarin for thromboembolic ppx -- also has Factor V Leiden deficiency    3  Cardiomyopathy - new, ischemic s/p STEMI, LVEF 30-35%  - continue metoprolol XL  - continue lisinopril 2 5 mg daily, BP is borderline will stop amlodipine  - can consider additional GDMT including Jardiance during follow-up, presently limited by BP  - Life Vest provided -- will need re-eval in 45 days to determine if AICD is required  - referral to cardiac rehab    4  HFpEF - now combined systolic & diastolic CHF  -continue home torsemide 20 mg daily    5  HTN  - continue metoprolol XL, torsemide, doxazosin  - stop amlodipine, start lisinopril as above    6  Moderate AS  - will continue outpatient monitoring    7  SUE8C-2  - creatinine at baseline  - continue torsemide     Stable for discharge today  Subjective:   Patient seen and examined  No new complaints        Objective:     Vitals: Blood pressure 101/55, pulse 57, temperature 98 °F (36 7 °C), temperature source Oral, resp  rate 20, height 5' 11\" (1 803 m), weight 113 kg (250 lb), SpO2 93 %  , Body mass index is " 34 87 kg/m² ,   Orthostatic Blood Pressures    Flowsheet Row Most Recent Value   Blood Pressure 101/55 filed at 04/12/2023 1036   Patient Position - Orthostatic VS Lying filed at 04/12/2023 1036            Intake/Output Summary (Last 24 hours) at 4/12/2023 1129  Last data filed at 4/12/2023 5250  Gross per 24 hour   Intake 840 ml   Output 1075 ml   Net -235 ml       Tele review: NSR; no significant ectopy last 12 hrs      Physical Exam:    General: Jenni Hanna is a well appearing male, in no acute distress, sitting comfortably  HEENT: moist mucous membranes, EOMI  Neck:  No JVD, supple, trachea midline  Cardiovascular: unremarkable S1/S2, regular rate and rhythm, 2/6 SM  Pulmonary: normal respiratory effort, CTAB  Abdomen: soft and nondistended  Extremities: No lower extremity edema  Warm and well perfused extremities    Neuro: no focal motor deficits, AAOx3 (person, place, time)  Psych: Normal mood and affect, cooperative            Medications:      Current Facility-Administered Medications:   •  acetaminophen (TYLENOL) tablet 650 mg, 650 mg, Oral, Q6H PRN, Higinio Clemente MD, 650 mg at 04/10/23 0012  •  atorvastatin (LIPITOR) tablet 80 mg, 80 mg, Oral, QPM, Higinio Clemente MD, 80 mg at 04/11/23 1741  •  docusate sodium (COLACE) capsule 100 mg, 100 mg, Oral, BID, Shira Ortiz MD, 100 mg at 04/12/23 0857  •  doxazosin (CARDURA) tablet 1 mg, 1 mg, Oral, HS, Higinio Clemente MD, 1 mg at 04/09/23 2234  •  DULoxetine (CYMBALTA) delayed release capsule 60 mg, 60 mg, Oral, Daily, Higinio Clemente MD, 60 mg at 04/12/23 0857  •  gabapentin (NEURONTIN) capsule 300 mg, 300 mg, Oral, Daily, Higinio Clemente MD, 300 mg at 04/12/23 0858  •  lisinopril (ZESTRIL) tablet 2 5 mg, 2 5 mg, Oral, Daily, Shira Ortiz MD, 2 5 mg at 04/12/23 0858  •  metoprolol succinate (TOPROL-XL) 24 hr tablet 100 mg, 100 mg, Oral, Daily, Higinio Clemente MD, 100 mg at 04/12/23 0872  •  polyethylene glycol "(MIRALAX) packet 17 g, 17 g, Oral, Daily PRN, Vida Miramontes MD, 17 g at 04/11/23 2030  •  Prednisol Ace-Moxiflox-Bromfen 1-0 5-0 075 % SUSP 1 drop, 1 drop, Right Eye, 4x Daily, Edith Snyder MD, 1 drop at 04/12/23 0858  •  senna (SENOKOT) tablet 8 6 mg, 1 tablet, Oral, HS, Teagan Lock MD, 8 6 mg at 04/11/23 2109  •  tamsulosin (FLOMAX) capsule 0 4 mg, 0 4 mg, Oral, Daily With Dinner, Fish Melvin MD, 0 4 mg at 04/11/23 1742  •  torsemide (DEMADEX) tablet 20 mg, 20 mg, Oral, Daily, Teagan Lock MD, 20 mg at 04/12/23 0859  •  warfarin (COUMADIN) tablet 5 mg, 5 mg, Oral, Daily (warfarin), Fish Melvin MD, 5 mg at 04/11/23 1742     Labs & Results:        Results from last 7 days   Lab Units 04/10/23  0500 04/09/23  1902   WBC Thousand/uL 8 91 7 00   HEMOGLOBIN g/dL 9 3* 10 7*   HEMATOCRIT % 28 7* 33 2*   PLATELETS Thousands/uL 199 223     Results from last 7 days   Lab Units 04/09/23  1902   TRIGLYCERIDES mg/dL 278*   HDL mg/dL 45     Results from last 7 days   Lab Units 04/12/23  0502 04/11/23  0439 04/10/23  0500 04/09/23  1902   POTASSIUM mmol/L 3 4* 3 7 3 8 3 4*   CHLORIDE mmol/L 103 104 108 101   CO2 mmol/L 25 27 26 28   BUN mg/dL 42* 32* 34* 39*   CREATININE mg/dL 3 07* 2 60* 2 43* 2 59*   CALCIUM mg/dL 8 9 9 1 9 1 9 9   ALK PHOS U/L  --   --   --  78   ALT U/L  --   --   --  9   AST U/L  --   --   --  16     Results from last 7 days   Lab Units 04/12/23  0503 04/11/23  0439 04/10/23  0500 04/09/23  1902   INR  3 14* 2 80* 3 15* 2 85*   PTT seconds  --   --   --  38*     Results from last 7 days   Lab Units 04/09/23  1902   MAGNESIUM mg/dL 2 1            This note was completed in part utilizing [employer] direct voice recognition software   Grammatical errors, random word insertion, spelling mistakes, occasional wrong word or \"sound-alike\" substitutions and incomplete sentences may be an occasional consequence of the system secondary to software limitations, ambient " noise and hardware issues  At the time of dictation, efforts were made to edit, clarify and /or correct errors  Please read the chart carefully and recognize, using context, where substitutions have occurred  If you have any questions or concerns about the context, text or information contained within the body of this dictation, please contact myself, the provider, for further clarification

## 2023-04-12 NOTE — PHYSICAL THERAPY NOTE
Physical Therapy Evaluation     Patient's Name: Geovani Woo    Admitting Diagnosis  STEMI (ST elevation myocardial infarction) [facility]) [I21 3]    Problem List  Patient Active Problem List   Diagnosis    Status post catheter ablation of atrial flutter    WILL (obstructive sleep apnea)    Factor V Leiden mutation [facility])    Erectile dysfunction    Insomnia    Lumbar herniated disc    Primary osteoarthritis of left knee    Primary osteoarthritis of right knee    Paroxysmal A-fib (HCC)    Lower extremity edema    Mixed hyperlipidemia    History of DVT of lower extremity    IFG (impaired fasting glucose)    Pes anserinus bursitis of right knee    Cervical myelopathy (HCC)    Sinus bradycardia    Goals of care, counseling/discussion    Muscle spasm    Head pain cephalgia    Anemia    Surgical site infection    S/P cervical spinal fusion    Great toe pain, right    Ambulatory dysfunction    JANEL (acute kidney injury) ([facility] )    Other proteinuria    Glomerulonephritis    Urinary frequency    Microscopic hematuria    Chronic diastolic (congestive) heart failure ([facility])    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    Anticoagulated on warfarin    Chronic pain of both knees    Other spondylosis with myelopathy, cervical region    Balance disorder    At high risk for injury related to fall    Obesity, morbid ([facility] )    Anemia due to stage 4 chronic kidney disease ([facility])    Peripheral vascular disease, unspecified ([facility] )    Chronic kidney disease    Coronary artery disease involving native coronary artery of native heart without angina pectoris    Ischemic cardiomyopathy       Past Medical History  Past Medical History:   Diagnosis Date    Acute venous embolism and thrombosis of deep vessels of proximal lower extremity ([facility])     Last assessed: 5/18/15    Arthritis     Cellulitis     LE    Chronic kidney disease 3/2020    Acute Kidney Injury    CPAP (continuous positive airway pressure) dependence     Factor V Leiden ([facility] ) Forgetfulness     Gross hematuria 5/17/2022    Headache(784 0) 3/2022    Never till cervical  spine surgery    Heart murmur 3/2020    Told when in hospital    [facility] (hard of hearing)     Hypoalbuminemia 5/11/2022    Other acute osteomyelitis, other site ([facility] ) 1/3/2023    Paroxysmal atrial fibrillation (HCC)     Last assessed: 11/2/15    Sleep apnea        Past Surgical History  Past Surgical History:   Procedure Laterality Date    BACK SURGERY      Lower    CARDIAC CATHETERIZATION N/A 4/9/2023    Procedure: Cardiac pci;  Surgeon: Radha Hernandez MD;  [facility]; Service: Cardiology    CARDIAC CATHETERIZATION N/A 4/9/2023    Procedure: Cardiac Coronary Angiogram;  Surgeon: Radha Hernandez MD;  Location: [facility]; Service: Cardiology    CATARACT EXTRACTION      COLON SURGERY      COLONOSCOPY      INCISION AND DRAINAGE POSTERIOR SPINE N/A 04/01/2022    Procedure: Posterior cervical evacuation of postoperative collection and debridement with placement of drains C3-T1; Surgeon: Jessica Byrnes MD;  Location: [facility] MAIN [facility];  Service: Neurosurgery    IR BIOPSY KIDNEY RANDOM  05/26/2022    IR TUNNELED DIALYSIS CATHETER PLACEMENT  05/23/2022    IR TUNNELED DIALYSIS CATHETER REMOVAL  06/02/2022    AL ARTHRD ANT INTERBODY  [address] CRV BELOW C2 N/A 03/11/2022    Procedure: Anterior cervical discectomy and fixation fusion C5/6 and C6/7; Posterior cervical decompression and instrumented fusion C3-T1; Surgeon: Jessica Byrnes MD;  Location: [facility] MAIN [facility];  Service: Neurosurgery    SPINE SURGERY  03/11/2022    Cervical myelopathy/ cervical fusion          04/12/23 0855   PT Last Visit   PT Visit Date 04/12/23   Note Type   Note type Evaluation   Pain Assessment   Pain Assessment Tool 0-10   Pain Score No Pain   Restrictions/Precautions   Weight Bearing Precautions Per Order No   Other Precautions Multiple lines;Telemetry; Fall Risk   Home Living   Type of [address] Two level  (5STE)   Bathroom Shower/Tub Walk-in shower  (and tub)   Bathroom Toilet Standard   Bathroom Equipment   (denies)   Home Equipment Walker  (uses in community)   Prior Function   Level of [place] Independent with ADLs; Independent with functional mobility; Independent with IADLS   Lives With Spouse   IADLs Independent with driving; Independent with meal prep; Independent with medication management   Falls in the last 6 months 0   General   Family/Caregiver Present No   Cognition   Overall Cognitive Status WFL   Arousal/Participation Alert   Orientation Level Oriented X4   Memory Within functional limits   Following Commands Follows all commands and directions without difficulty   Subjective   Subjective Pleasant and agreeable to participate   RLE Assessment   RLE Assessment   (functionally 3+/5)   LLE Assessment   LLE Assessment   (functionally 3+/5)   Bed Mobility   Supine to Sit 5  Supervision   Additional items HOB elevated   Additional Comments Left OOB in chair with all needs in reach   Transfers   Sit to Stand 5  Supervision   Stand to Sit 5  Supervision   Additional Comments with RW   Ambulation/Elevation   Gait pattern Excessively slow; Antalgic  (mild unsteadiness)   Gait Assistance 5  Supervision   Assistive Device Rolling walker   Distance 40 ft x 1  (see below for additional)   Balance   Static Sitting Good   Dynamic Sitting Fair +   Static Standing Fair   Dynamic Standing Fair -   Ambulatory Fair -   Activity Tolerance   Activity Tolerance Patient tolerated treatment well  (hypotnesion, however, not symptomatic)   Nurse Made Aware RN cleared pt to be seen by PT   Assessment   Prognosis Good   Assessment Pt seen for high complexity PT evaluation due to decrease in functional mobility status compared to baseline  Pt with active PT eval/treat orders at this time  Pt is a 68 y o  M who presented to [facility] as a transfer from [address] on 4/9/23 where he initially presented with chest pain  Pt dx with STEMI  Pt  has a past medical history of Acute venous embolism and thrombosis of deep vessels of proximal lower extremity ([facility] ), Arthritis, Cellulitis, Chronic kidney disease (3/2020), CPAP (continuous positive airway pressure) dependence, Factor V Leiden ([facility] ), Forgetfulness, Gross hematuria (5/17/2022), Headache(784 0) (3/2022), Heart murmur (3/2020), [place] (hard of hearing), Hypoalbuminemia (5/11/2022), Other acute osteomyelitis, other site [facility]) (1/3/2023), Paroxysmal atrial fibrillation ([facility] ), and Sleep apnea  Pt resides with spouse in [facility] with 5STE  Pt requires supervision for all mobility at this time  Pt left upright in bedside chair with all needs in reach  Pt will benefit from skilled therapy in order to address current impairments and functional limitations  PT to DC pt as pt has no further acute skilled PT needs and recommending OPPT  The patient's AM-PAC Basic Mobility Inpatient Short Form Raw Score is 19  A Raw score of greater than 16 suggests the patient may benefit from discharge to home  Please also refer to the recommendation of the Physical Therapist for safe discharge planning  Barriers to Discharge None   Goals   Patient Goals to get stronger   Recommendation   PT Discharge Recommendation Home with outpatient rehabilitation   Equipment Recommended [address] [facility] Recommended Wheeled walker   Change/add to [employer]?  No   AM-PAC Basic Mobility Inpatient   Turning in Flat Bed Without Bedrails 4   Lying on Back to Sitting on Edge of Flat Bed Without Bedrails 3   Moving Bed to Chair 3   Standing Up From Chair Using Arms 3   Walk in Room 3   Climb 3-5 Stairs With Railing 3   Basic Mobility Inpatient Raw Score 19   Basic Mobility Standardized Score 42 48   Highest Level Of Mobility   JH-HLM Goal 6: Walk 10 steps or more   JH-HLM Achieved 8: Walk 250 feet ot more   Modified [place] Scale   Modified [place] Scale 3   Additional Treatment Session   Start Time 0847   End Time "[id number]   Treatment Assessment Pt agreeable to additional gait training  Pt ambulated additional 200 ft x 2 with RW  With fatigue, pt with slight increase in undsteadiness as well as antalgic gait 2/2 pt reports of \"bad knees\"  Pt tolerated prolonged distance ambulation well     Equipment Use RW         Jada Gunn, PT, DPT  "

## 2023-04-13 NOTE — UTILIZATION REVIEW
NOTIFICATION OF ADMISSION DISCHARGE   This is a Notification of Discharge from [address]  Please be advised that this patient has been discharge from our facility  Below you will find the admission and discharge date and time including the patient’s disposition  UTILIZATION REVIEW CONTACT:  Alize Harp  Utilization   Network Utilization Review Department  Phone: [phone] x carefully listen to the prompts  All voicemails are confidential   Email: [email or web address]     ADMISSION INFORMATION  PRESENTATION DATE: 4/9/2023  8:21 PM  OBERVATION ADMISSION DATE:   INPATIENT ADMISSION DATE: 4/9/23  8:29 PM   DISCHARGE DATE: 4/12/2023  6:11 PM   DISPOSITION:Home/Self Care    IMPORTANT INFORMATION:  Send all requests for admission clinical reviews, approved or denied determinations and any other requests to dedicated fax number below belonging to the campus where the patient is receiving treatment   List of dedicated fax numbers:  [address] DENIALS (Administrative/Medical Necessity) [phone]   [address] (Maternity/NICU/Pediatrics) [phone]   [facility] [phone]   [facility] 87 [phone]   [address] [phone]   [address] [facility] [phone] 90 [facility] [phone]   [address] [place], [place] [address] [address] [phone]   [facility]  [phone]   [address] [facility] [phone]   [address] [facility] [address] [facility] [phone]

## 2023-04-14 ENCOUNTER — APPOINTMENT (OUTPATIENT)
Dept: PHYSICAL THERAPY | Facility: CLINIC | Age: 73
End: 2023-04-14

## 2023-04-18 ENCOUNTER — HOSPITAL ENCOUNTER (OUTPATIENT)
Facility: HOSPITAL | Age: 73
Setting detail: OBSERVATION
Discharge: HOME | End: 2023-04-19
Attending: EMERGENCY MEDICINE | Admitting: STUDENT IN AN ORGANIZED HEALTH CARE EDUCATION/TRAINING PROGRAM

## 2023-04-18 ENCOUNTER — APPOINTMENT (EMERGENCY)
Dept: NON INVASIVE DIAGNOSTICS | Facility: HOSPITAL | Age: 73
End: 2023-04-18

## 2023-04-18 ENCOUNTER — APPOINTMENT (EMERGENCY)
Dept: RADIOLOGY | Facility: HOSPITAL | Age: 73
End: 2023-04-18

## 2023-04-18 DIAGNOSIS — R60.9 EDEMA, UNSPECIFIED TYPE: ICD-10-CM

## 2023-04-18 DIAGNOSIS — N18.9 CHRONIC KIDNEY DISEASE: ICD-10-CM

## 2023-04-18 DIAGNOSIS — I95.9 HYPOTENSION: ICD-10-CM

## 2023-04-18 DIAGNOSIS — I25.10 CORONARY ARTERY DISEASE INVOLVING NATIVE CORONARY ARTERY OF NATIVE HEART WITHOUT ANGINA PECTORIS: Primary | Chronic | ICD-10-CM

## 2023-04-18 DIAGNOSIS — E66.01 OBESITY, MORBID (HCC): ICD-10-CM

## 2023-04-18 DIAGNOSIS — N18.9 CHRONIC KIDNEY DISEASE, UNSPECIFIED CKD STAGE: ICD-10-CM

## 2023-04-18 DIAGNOSIS — I25.5 ISCHEMIC CARDIOMYOPATHY: ICD-10-CM

## 2023-04-18 DIAGNOSIS — I25.2 HISTORY OF ST ELEVATION MYOCARDIAL INFARCTION (STEMI): ICD-10-CM

## 2023-04-18 LAB
2HR DELTA HS TROPONIN: -50 NG/L
4HR DELTA HS TROPONIN: -90 NG/L
ALBUMIN SERPL BCP-MCNC: 4.2 G/DL (ref 3.5–5)
ALP SERPL-CCNC: 82 U/L (ref 34–104)
ALT SERPL W P-5'-P-CCNC: 14 U/L (ref 7–52)
ANION GAP SERPL CALCULATED.3IONS-SCNC: 12 MMOL/L (ref 4–13)
APTT PPP: 46 SECONDS (ref 23–37)
AST SERPL W P-5'-P-CCNC: 20 U/L (ref 13–39)
ATRIAL RATE: 115 BPM
ATRIAL RATE: 340 BPM
BACTERIA UR QL AUTO: NORMAL /HPF
BASOPHILS # BLD AUTO: 0.03 THOUSANDS/ΜL (ref 0–0.1)
BASOPHILS NFR BLD AUTO: 0 % (ref 0–1)
BILIRUB SERPL-MCNC: 0.63 MG/DL (ref 0.2–1)
BILIRUB UR QL STRIP: NEGATIVE
BUN SERPL-MCNC: 51 MG/DL (ref 5–25)
CALCIUM SERPL-MCNC: 9.6 MG/DL (ref 8.4–10.2)
CARDIAC TROPONIN I PNL SERPL HS: 245 NG/L
CARDIAC TROPONIN I PNL SERPL HS: 285 NG/L
CARDIAC TROPONIN I PNL SERPL HS: 335 NG/L
CHLORIDE SERPL-SCNC: 99 MMOL/L (ref 96–108)
CLARITY UR: CLEAR
CO2 SERPL-SCNC: 22 MMOL/L (ref 21–32)
COLOR UR: YELLOW
CREAT SERPL-MCNC: 3.33 MG/DL (ref 0.6–1.3)
EOSINOPHIL # BLD AUTO: 0.07 THOUSAND/ΜL (ref 0–0.61)
EOSINOPHIL NFR BLD AUTO: 1 % (ref 0–6)
ERYTHROCYTE [DISTWIDTH] IN BLOOD BY AUTOMATED COUNT: 13.5 % (ref 11.6–15.1)
GFR SERPL CREATININE-BSD FRML MDRD: 17 ML/MIN/1.73SQ M
GLUCOSE SERPL-MCNC: 112 MG/DL (ref 65–140)
GLUCOSE UR STRIP-MCNC: NEGATIVE MG/DL
HCT VFR BLD AUTO: 30.3 % (ref 36.5–49.3)
HGB BLD-MCNC: 9.7 G/DL (ref 12–17)
HGB UR QL STRIP.AUTO: ABNORMAL
IMM GRANULOCYTES # BLD AUTO: 0.02 THOUSAND/UL (ref 0–0.2)
IMM GRANULOCYTES NFR BLD AUTO: 0 % (ref 0–2)
INR PPP: 2.65 (ref 0.84–1.19)
KETONES UR STRIP-MCNC: NEGATIVE MG/DL
LEUKOCYTE ESTERASE UR QL STRIP: NEGATIVE
LYMPHOCYTES # BLD AUTO: 1.14 THOUSANDS/ΜL (ref 0.6–4.47)
LYMPHOCYTES NFR BLD AUTO: 17 % (ref 14–44)
MAGNESIUM SERPL-MCNC: 2.2 MG/DL (ref 1.9–2.7)
MCH RBC QN AUTO: 29.3 PG (ref 26.8–34.3)
MCHC RBC AUTO-ENTMCNC: 32 G/DL (ref 31.4–37.4)
MCV RBC AUTO: 92 FL (ref 82–98)
MONOCYTES # BLD AUTO: 0.47 THOUSAND/ΜL (ref 0.17–1.22)
MONOCYTES NFR BLD AUTO: 7 % (ref 4–12)
NEUTROPHILS # BLD AUTO: 5.04 THOUSANDS/ΜL (ref 1.85–7.62)
NEUTS SEG NFR BLD AUTO: 75 % (ref 43–75)
NITRITE UR QL STRIP: NEGATIVE
NON-SQ EPI CELLS URNS QL MICRO: NORMAL /HPF
NRBC BLD AUTO-RTO: 0 /100 WBCS
PH UR STRIP.AUTO: 5 [PH]
PHOSPHATE SERPL-MCNC: 4.3 MG/DL (ref 2.3–4.1)
PLATELET # BLD AUTO: 231 THOUSANDS/UL (ref 149–390)
PMV BLD AUTO: 9.7 FL (ref 8.9–12.7)
POTASSIUM SERPL-SCNC: 5 MMOL/L (ref 3.5–5.3)
PROT SERPL-MCNC: 7.8 G/DL (ref 6.4–8.4)
PROT UR STRIP-MCNC: NEGATIVE MG/DL
PROTHROMBIN TIME: 29.6 SECONDS (ref 11.6–14.5)
QRS AXIS: 115 DEGREES
QRS AXIS: 122 DEGREES
QRSD INTERVAL: 154 MS
QRSD INTERVAL: 156 MS
QT INTERVAL: 378 MS
QT INTERVAL: 382 MS
QTC INTERVAL: 487 MS
QTC INTERVAL: 523 MS
RBC # BLD AUTO: 3.31 MILLION/UL (ref 3.88–5.62)
RBC #/AREA URNS AUTO: NORMAL /HPF
SODIUM SERPL-SCNC: 133 MMOL/L (ref 135–147)
SP GR UR STRIP.AUTO: 1.01 (ref 1–1.03)
T WAVE AXIS: -43 DEGREES
T WAVE AXIS: -64 DEGREES
UROBILINOGEN UR STRIP-ACNC: <2 MG/DL
VENTRICULAR RATE: 100 BPM
VENTRICULAR RATE: 113 BPM
WBC # BLD AUTO: 6.77 THOUSAND/UL (ref 4.31–10.16)
WBC #/AREA URNS AUTO: NORMAL /HPF

## 2023-04-18 RX ORDER — WARFARIN SODIUM 2.5 MG/1
2.5 TABLET ORAL
Status: DISCONTINUED | OUTPATIENT
Start: 2023-04-18 | End: 2023-04-19 | Stop reason: HOSPADM

## 2023-04-18 RX ORDER — GABAPENTIN 300 MG/1
300 CAPSULE ORAL
Status: DISCONTINUED | OUTPATIENT
Start: 2023-04-18 | End: 2023-04-19 | Stop reason: HOSPADM

## 2023-04-18 RX ORDER — CLOPIDOGREL BISULFATE 75 MG/1
75 TABLET ORAL DAILY
Status: DISCONTINUED | OUTPATIENT
Start: 2023-04-18 | End: 2023-04-19 | Stop reason: HOSPADM

## 2023-04-18 RX ORDER — ACETAMINOPHEN 325 MG/1
650 TABLET ORAL AS NEEDED
Status: DISCONTINUED | OUTPATIENT
Start: 2023-04-18 | End: 2023-04-19 | Stop reason: HOSPADM

## 2023-04-18 RX ORDER — TRIAMCINOLONE ACETONIDE 1 MG/G
CREAM TOPICAL 2 TIMES DAILY
Status: DISCONTINUED | OUTPATIENT
Start: 2023-04-18 | End: 2023-04-19 | Stop reason: HOSPADM

## 2023-04-18 RX ORDER — DULOXETIN HYDROCHLORIDE 30 MG/1
60 CAPSULE, DELAYED RELEASE ORAL DAILY
Status: DISCONTINUED | OUTPATIENT
Start: 2023-04-18 | End: 2023-04-19 | Stop reason: HOSPADM

## 2023-04-18 RX ORDER — PREDNISOLONE ACETATE 10 MG/ML
1 SUSPENSION/ DROPS OPHTHALMIC 3 TIMES DAILY
Status: DISCONTINUED | OUTPATIENT
Start: 2023-04-18 | End: 2023-04-19 | Stop reason: HOSPADM

## 2023-04-18 RX ORDER — ZOLPIDEM TARTRATE 5 MG/1
10 TABLET ORAL
Status: DISCONTINUED | OUTPATIENT
Start: 2023-04-18 | End: 2023-04-19 | Stop reason: HOSPADM

## 2023-04-18 RX ORDER — METOPROLOL SUCCINATE 25 MG/1
25 TABLET, EXTENDED RELEASE ORAL DAILY
Status: DISCONTINUED | OUTPATIENT
Start: 2023-04-19 | End: 2023-04-19 | Stop reason: HOSPADM

## 2023-04-18 RX ORDER — ATORVASTATIN CALCIUM 40 MG/1
80 TABLET, FILM COATED ORAL EVERY EVENING
Status: DISCONTINUED | OUTPATIENT
Start: 2023-04-18 | End: 2023-04-19 | Stop reason: HOSPADM

## 2023-04-18 RX ORDER — TAMSULOSIN HYDROCHLORIDE 0.4 MG/1
0.4 CAPSULE ORAL
Status: DISCONTINUED | OUTPATIENT
Start: 2023-04-18 | End: 2023-04-19 | Stop reason: HOSPADM

## 2023-04-18 RX ADMIN — TAMSULOSIN HYDROCHLORIDE 0.4 MG: 0.4 CAPSULE ORAL at 16:18

## 2023-04-18 RX ADMIN — ATORVASTATIN CALCIUM 80 MG: 40 TABLET, FILM COATED ORAL at 17:08

## 2023-04-18 RX ADMIN — PREDNISOLONE ACETATE 1 DROP: 10 SUSPENSION/ DROPS OPHTHALMIC at 22:38

## 2023-04-18 RX ADMIN — TRIAMCINOLONE ACETONIDE: 1 CREAM TOPICAL at 17:35

## 2023-04-18 RX ADMIN — PREDNISOLONE ACETATE 1 DROP: 10 SUSPENSION/ DROPS OPHTHALMIC at 16:19

## 2023-04-18 RX ADMIN — SODIUM CHLORIDE 250 ML: 0.9 INJECTION, SOLUTION INTRAVENOUS at 12:42

## 2023-04-18 NOTE — ASSESSMENT & PLAN NOTE
· With recent stent placement as documented above  · Continue aspirin, statin, and beta-blocker if able to tolerate

## 2023-04-18 NOTE — ASSESSMENT & PLAN NOTE
· Previously on flecainide but discontinued status post STEMI> now continue with Toprol  daily  · Maintained on Coumadin  · Goal INR 2-3

## 2023-04-18 NOTE — H&P
[place]  H&P  Name: Jourdan Clements 68 y o  male I MRN: [id number]  Unit/Bed#: ED 09 I Date of Admission: 4/18/2023   Date of Service: 4/18/2023 I Hospital Day: 0      Assessment/Plan   JANEL (acute kidney injury) ([facility] )  Assessment & Plan  · 3 33 on admission due to hypotension and diuretic use  · Baseline appears to be 2 6 - 3 0  · S/P IVF in ED  · Holding Lisinopril and diuretics  · Decrease metoprolol to 25mg daily    * Hypotension  Assessment & Plan  Background: Presented to the outpatient office on 4/18 and recommended to come to the ER for evaluation given ongoing hypotension thought to be secondary to new cardiomyopathy and inability to tolerate higher antihypertensive regimen with new reduced ejection fraction    · Symptoms associated include fatigue and lightheadedness however denied chest pain, palpitations, nausea, or episodes of syncope  · EKG  · Chest x-ray  · Cardiology consulted for assistance with medication regimen  · Monitor with routine vitals    History of ST elevation myocardial infarction (STEMI)  Assessment & Plan  · Discharged on 4/12 for STEMI status post cardiac catheterization found to have mid LAD 99% stenosis status post PCI x2  · See plan as noted above    Ischemic cardiomyopathy  Assessment & Plan  · TTE from 4/10 with LVEF of 30 to 35% and apical hypokinesis  · LifeVest continuous telemetry  · Continue diuretic with 20 mg Demadex daily    Coronary artery disease involving native coronary artery of native heart without angina pectoris  Assessment & Plan  · With recent stent placement as documented above  · Continue aspirin, statin, and beta-blocker if able to tolerate    Anemia due to stage 4 chronic kidney disease ([facility] )  Assessment & Plan  · Epogen as an outpatient every 14 days  · Monitor closely given factor V Leyden deficiency and on Coumadin therapy  · INR therapeutic on admission, trend daily  · Baseline appears to be approximately 10-11  · Stable on admission  · Avoid nephrotoxins, and NSAIDs if possible  · Will have cardiology assist with medical management to avoid hypotension  · Serial lab monitoring    Chronic diastolic (congestive) heart failure (HCC)  Assessment & Plan  Wt Readings from Last 3 Encounters:   04/18/23 112 kg (247 lb)   04/18/23 112 kg (247 lb)   04/10/23 113 kg (250 lb)     · With ischemic cardiomyopathy and LifeVest placement  · Monitor on tele  · EF of 30% status post STEMI  · Daily weight  · Intake and output  · Monitor volume status with serial physical exam  · Holding diuretics and received fluids in ED for hypotension  · Anticipate decreasing torsemide to 10mg daily        Paroxysmal A-fib (HCC)  Assessment & Plan  · Previously on flecainide but discontinued status post STEMI> now continue with Toprol  daily  · Maintained on Coumadin  · Goal INR 2-3           VTE Prophylaxis: Warfarin (Coumadin)  / sequential compression device   Code Status: full code   Discussion with family: Patient reports he will update family himself    Anticipated Length of Stay:  Patient will be admitted on an Observation basis with an anticipated length of stay of  < 2 midnights  Justification for Hospital Stay: hypotension     Total Time for Visit, including Counseling / Coordination of Care: 45 minutes  Greater than 50% of this total time spent on direct patient counseling and coordination of care      Past Medical History:    Past Medical History:   Diagnosis Date   • Acute venous embolism and thrombosis of deep vessels of proximal lower extremity ([address] )     Last assessed: 5/18/15   • Arthritis    • Cellulitis     LE   • Chronic kidney disease 3/2020    Acute Kidney Injury   • CPAP (continuous positive airway pressure) dependence    • Factor V Leiden ([facility] )    • Forgetfulness    • Gross hematuria 5/17/2022   • Headache(784 0) 3/2022    Never till cervical  spine surgery   • Heart murmur 3/2020    Told when in hospital   [facility]    • Hypoalbuminemia 5/11/2022   • Other acute osteomyelitis, other site [facility]) 1/3/2023   • Paroxysmal atrial fibrillation ([facility] )     Last assessed: 11/2/15   • Sleep apnea        Chief Complaint:   Hypotension (Patient presents to the ER wearing a life vest, being hypotensive post having a heart attack and being stented up in [place] )      History of Present Illness:    Arlen Batres is a 68 y o  male with past medical history as noted in table above who presents with Hypotension (Patient presents to the ER wearing a life vest, being hypotensive post having a heart attack and being stented up in [place] )   Patient has been intermittently dizzy at home since his procedure with feelings of fatigue and weakness  Cr elevated on admission at 3 33 likely due to new diuretics and hypotension  Holding diuretics and lisinopril  Received IVF bolus in ED  Cardiology following for medication adjustment  Will likely need to discontinue lisinopril long-term, metoprolol decreased to 25 mg  Holding diuretics for now  Trend blood pressure while admitted  No fluid restriction while admitted  Review of Systems:    Review of Systems   Constitutional: Positive for fatigue  Neurological: Positive for weakness  All other systems reviewed and are negative        Past Surgical History:     Past Medical History:   Diagnosis Date   • Acute venous embolism and thrombosis of deep vessels of proximal lower extremity (HCC)     Last assessed: 5/18/15   • Arthritis    • Cellulitis     LE   • Chronic kidney disease 3/2020    Acute Kidney Injury   • CPAP (continuous positive airway pressure) dependence    • Factor V Leiden ([facility] )    • Forgetfulness    • Gross hematuria 5/17/2022   • Headache(784 0) 3/2022    Never till cervical  spine surgery   • Heart murmur 3/2020    Told when in hospital   [facility]    • Hypoalbuminemia 5/11/2022   • Other acute osteomyelitis, other site ([facility] [address] ) 1/3/2023   • Paroxysmal atrial fibrillation [facility])     Last assessed: 11/2/15   • Sleep apnea        Past Surgical History:   Procedure Laterality Date   • BACK SURGERY      Lower   • CARDIAC CATHETERIZATION N/A 4/9/2023    Procedure: Cardiac pci;  Surgeon: Sierra Jacobs MD;  Location: [facility] CARD[facility]; Service: Cardiology   • CARDIAC CATHETERIZATION N/A 4/9/2023    Procedure: Cardiac Coronary Angiogram;  Surgeon: Sierra Jacobs MD;  Location: [facility] CARDIAC C[facility]; Service: Cardiology   • CATARACT EXTRACTION     • COLON SURGERY     • COLONOSCOPY     • INCISION AND DRAINAGE POSTERIOR SPINE N/A 04/01/2022    Procedure: Posterior cervical evacuation of postoperative collection and debridement with placement of drains C3-T1; Surgeon: Destinee Alcala MD;  Location: [facility] MAIN OR;  Service: Neurosurgery   • IR BIOPSY KIDNEY RANDOM  05/26/2022   • IR TUNNELED DIALYSIS CATHETER PLACEMENT  05/23/2022   • IR TUNNELED DIALYSIS CATHETER REMOVAL  06/02/2022   • IN ARTHRD ANT INTERBODY  [address] CRV BELOW C2 N/A 03/11/2022    Procedure: Anterior cervical discectomy and fixation fusion C5/6 and C6/7; Posterior cervical decompression and instrumented fusion C3-T1; Surgeon: Destinee Alcala MD;  Location: [facility] MAIN OR;  Service: Neurosurgery   • SPINE SURGERY  03/11/2022    Cervical myelopathy/ cervical fusion       Meds/Allergies:    Prior to Admission medications    Medication Sig Start Date End Date Taking?  Authorizing Provider   acetaminophen (TYLENOL) 500 mg tablet Take 500 mg by mouth if needed for mild pain    Historical Provider, MD   atorvastatin (LIPITOR) 80 mg tablet Take 1 tablet (80 mg total) by mouth every evening 4/12/23   Reymundo Christie MD   clopidogrel (PLAVIX) 75 mg tablet Take 1 tablet (75 mg total) by mouth daily 4/12/23   Loren Lock MD   DULoxetine (CYMBALTA) 60 mg delayed release capsule Take 1 capsule (60 mg total) by mouth daily 2/9/23   Kristen Palmer MD   epoetin asha (Epogen) 2,000 units/mL Inject under the skin every 14 (fourteen) days  Patient not taking: Reported on 4/18/2023 9/6/22   Historical Provider, MD   gabapentin (NEURONTIN) 300 mg capsule TAKE 1 CAPSULE BY MOUTH  DAILY AT BEDTIME 1/26/23   Isacc Morrow MD   methocarbamol (ROBAXIN) 750 mg tablet Take 1 tablet (750 mg total) by mouth every 6 (six) hours as needed for muscle spasms  Patient not taking: Reported on 4/18/2023 6/10/22   Isacc Morrow MD   metoprolol succinate (TOPROL-XL) 50 mg 24 hr tablet Take 1 tablet (50 mg total) by mouth daily 4/18/23   Shira Ortiz MD   mometasone (ELOCON) 0 1 % cream Apply topically daily 3/22/23   Isacc Morrow MD   potassium chloride (K-DUR,KLOR-CON) 20 mEq tablet Take 1 tablet (20 mEq total) by mouth daily 4/12/23 5/12/23  Mert Lock MD   prednisoLONE acetate (PRED FORTE) 1 % ophthalmic suspension INSTILL ONE DROP INTO THE RIGHT EYE 4 TIMES A DAY 4/13/23   Historical Provider, MD   tamsulosin (FLOMAX) 0 4 mg TAKE 1 CAPSULE BY MOUTH  DAILY WITH DINNER 12/28/22   Isacc Morrow MD   torsemide (DEMADEX) 20 mg tablet TAKE 1 TABLET BY MOUTH EVERY DAY 2/1/23   Renata Covarrubias DO   warfarin (COUMADIN) 5 mg tablet TAKE 2 5MG (1/2 TAB) BY  MOUTH DAILY EXCEPT  WED   AND SAT TAKE 5MG ( 1 TAB) DAILY 4/3/23   Isacc Morrow MD   zolpidem (AMBIEN) 10 mg tablet Take 1 tablet (10 mg total) by mouth daily at bedtime as needed for sleep 2/2/23   Isacc Morrow MD   doxazosin (CARDURA) 1 mg tablet TAKE 1 TABLET BY MOUTH DAILY AT BEDTIME 2/9/23 4/18/23  Isacc Morrow MD   lisinopril (ZESTRIL) 2 5 mg tablet Take 1 tablet (2 5 mg total) by mouth daily Do not start before April 13, 2023 4/13/23 4/18/23  Shira Ortiz MD   metoprolol succinate (TOPROL-XL) 100 mg 24 hr tablet TAKE 1 TABLET BY MOUTH DAILY 4/3/23 4/18/23  Isacc Morrow MD   metoprolol succinate (TOPROL-XL) 50 mg 24 hr tablet Take 2 tablets (100 mg total) by mouth daily 4/18/23 4/18/23  Shira Ortiz MD     I have reviewed home medications using "allscripts  Allergies: Allergies   Allergen Reactions   • Morphine GI Intolerance   • Vitamin K Other (See Comments)     On coumadin-patient sensitive to vitamin K amounts in meds etc        Social History:     Marital Status: /Civil Union   Occupation: non-contributory  Patient Pre-hospital Living Situation: home  Patient Pre-hospital Level of Mobility: ambulatiry  Patient Pre-hospital Diet Restrictions: renal, cardiac  Substance Use History:   Social History     Substance and Sexual Activity   Alcohol Use Not Currently   • Alcohol/week: 0 0 standard drinks     Social History     Tobacco Use   Smoking Status Never   Smokeless Tobacco Never     Social History     Substance and Sexual Activity   Drug Use No       Family History:    Family History   Problem Relation Age of Onset   • Lung cancer Mother    • Hearing loss Father    • Heart attack Brother    • Atrial fibrillation Brother    • Emphysema Sister        Physical Exam:     Vitals:   Blood Pressure: 115/67 (04/18/23 1415)  Pulse: 59 (04/18/23 1415)  Temperature: 98 3 °F (36 8 °C) (04/18/23 1055)  Temp Source: Oral (04/18/23 1055)  Respirations: 17 (04/18/23 1415)  Height: 5' 11\" (180 3 cm) (04/18/23 1055)  Weight - Scale: 112 kg (247 lb) (04/18/23 1055)  SpO2: 100 % (04/18/23 1415)    Physical Exam  Vitals and nursing note reviewed  Constitutional:       General: He is not in acute distress  Appearance: Normal appearance  He is well-developed  HENT:      Head: Normocephalic and atraumatic  Eyes:      General: No scleral icterus  Conjunctiva/sclera: Conjunctivae normal    Cardiovascular:      Rate and Rhythm: Normal rate and regular rhythm  Heart sounds: Murmur heard  Pulmonary:      Effort: Pulmonary effort is normal       Breath sounds: No wheezing, rhonchi or rales  Abdominal:      General: There is no distension  Palpations: Abdomen is soft  Skin:     General: Skin is warm and dry     Neurological:      General: No " focal deficit present  Mental Status: He is alert  Psychiatric:         Mood and Affect: Mood normal        Additional Data:     Lab Results: I have personally reviewed pertinent reports  Results from last 7 days   Lab Units 04/18/23  1151   WBC Thousand/uL 6 77   HEMOGLOBIN g/dL 9 7*   HEMATOCRIT % 30 3*   PLATELETS Thousands/uL 231   NEUTROS PCT % 75   LYMPHS PCT % 17   MONOS PCT % 7   EOS PCT % 1     Results from last 7 days   Lab Units 04/18/23  1124   SODIUM mmol/L 133*   POTASSIUM mmol/L 5 0   CHLORIDE mmol/L 99   CO2 mmol/L 22   BUN mg/dL 51*   CREATININE mg/dL 3 33*   ANION GAP mmol/L 12   CALCIUM mg/dL 9 6   ALBUMIN g/dL 4 2   TOTAL BILIRUBIN mg/dL 0 63   ALK PHOS U/L 82   ALT U/L 14   AST U/L 20   GLUCOSE RANDOM mg/dL 112     Results from last 7 days   Lab Units 04/18/23  1151   INR  2 65*     Results from last 7 days   Lab Units 04/11/23  2037 04/11/23  1556   POC GLUCOSE mg/dl 119 98               Imaging: I have personally reviewed pertinent reports  VAS upper limb arterial duplex, unilateral/limited, graft   Final Result by Shaun Tay MD (04/18 1416)      XR chest 1 view portable    (Results Pending)       EKG, Pathology, and Other Studies Reviewed on Admission:   · EKG: with a fib which is known to patient   · Outpatient documentation reviewed when available     Allscripts / Epic Records Reviewed: Yes     ** Please Note: This note has been constructed using a voice recognition system   **

## 2023-04-18 NOTE — ASSESSMENT & PLAN NOTE
· Epogen as an outpatient every 14 days  · Monitor closely given factor V Leyden deficiency and on Coumadin therapy  · INR therapeutic on admission, trend daily  · Baseline appears to be approximately 10-11  · Stable on admission  · Avoid nephrotoxins, and NSAIDs if possible  · Will have cardiology assist with medical management to avoid hypotension  · Serial lab monitoring

## 2023-04-18 NOTE — CONSULTS
Consult - Cardiology   Nicole García 68 y o  male MRN: [id number]  Unit/Bed#: ED 09 Encounter: [id number]        Reason For Consult: Hypotension  Outpatient Cardiologist: Dr Lang Koyanagi:  1  Hypotension  a  Seen in the cardiology clinic April 18, 6818 with systolic pressure in the 13F subsequently sent to the ER  b  Initial systolic pressure in the 66L in the ER but improved with gentle IV fluid  c  Suspect hypotensive secondary to new cardiomyopathy and inability to tolerate his prior antihypertensive regiment with reduced EF  2  Acute on chronic kidney injury  a  Baseline creatinine looks around 2 7-3 0  b  Creatinine 3 33 on arrival, suspect secondary to hypotension  3  Ischemic cardiomyopathy  a  TTE 4/10/2023: LVEF 30-35% with anterior and apical hypokinesis, grade 2 diastolic dysfunction, severe RV hypokinesis of apical free wall, mild MR, moderate AS  b  Current diuretic: Torsemide 20 mg daily  4  History of coronary artery disease  a  Anterior STEM April 9, 2023  b  C 4/9/2023: LM minor irregularities, proximal LAD 99%, mid LAD 99%, mild diffuse circumflex disease, proximal to mid RCA 80%, PCI/ROBERT x2 to proximal and mid LAD, residual RCA managed medically  c  Maintained on warfarin and Plavix  d  GDMT: Toprol- daily, lisinopril 2 5 daily  5  Paroxysmal atrial fibrillation  a  Previously on flecainide but discontinued after his STEMI  b  On warfarin for thromboembolic prophylaxis as well as factor V Leiden deficiency  6  Moderate aortic stenosis on echo in April 2023  7  Factor V Leiden deficiency    PLAN/ DISCUSSION:     • Blood pressure is already improving with gentle IV fluids, SBP now 110's  • Patient's primary cardiologist did reach out to us to discuss the plan  We will hold his lisinopril and his diuretics for now  We will reintroduce beta-blockers at half of the dose he was previously on so Toprol-XL 50 mg daily  • Observe overnight and repeat BMP in a m    • We will need to gradually reintroduce guideline directed medical therapy as well as diuretics to avoid hypotension  • Continue warfarin and Plavix for recent PCI as well as AF  • Right upper extremity arterial duplex pending  • Continues to wear LifeVest    History Of Present Illness: This is a 15-year-old gentleman with recent anterior STEMI treated with 2 drug-eluting stents to his LAD artery  He has an ischemic cardiomyopathy with new reduced EF 30-35%  He has paroxysmal atrial fibrillation  He also has factor V Leiden deficiency and is maintained on warfarin therapy  He was just discharged from the hospital about 1 week ago  He was doing well on discharge  He came to the cardiology office this morning and was noted to be dizzy and hypotensive  His systolic blood pressure was in the 70s and 80s  He was sent to the ER  He was found to have a mild acute kidney injury  He was also hypotensive here  He was given 250 mL IV fluids and blood pressure improved with systolic in the 546Q  Symptomatically he felt much better  He has had no chest pain or chest pressure    We are seeing him in consultation for hypotension    Past Medical History:        Past Medical History:   Diagnosis Date   • Acute venous embolism and thrombosis of deep vessels of proximal lower extremity ([address] )     Last assessed: 5/18/15   • Arthritis    • Cellulitis     LE   • Chronic kidney disease 3/2020    Acute Kidney Injury   • CPAP (continuous positive airway pressure) dependence    • Factor V Leiden ([address] )    • Forgetfulness    • Gross hematuria 5/17/2022   • Headache(784 0) 3/2022    Never till cervical  spine surgery   • Heart murmur 3/2020    Told when in hospital   • PATRICIA [facility] (hard of hearing)    • Hypoalbuminemia 5/11/2022   • Other acute osteomyelitis, other site ([address] ) 1/3/2023   • Paroxysmal atrial fibrillation ([address] )     Last assessed: 11/2/15   • Sleep apnea       Past Surgical History:   Procedure Laterality Date   • BACK SURGERY      Lower • CARDIAC CATHETERIZATION N/A 4/9/2023    Procedure: Cardiac pci;  Surgeon: Rob Harvey MD;  [facility]: [facility]; Service: Cardiology   • CARDIAC CATHETERIZATION N/A 4/9/2023    Procedure: Cardiac Coronary Angiogram;  Surgeon: Rob Harvey MD;  Location: [facility]; Service: Cardiology   • CATARACT EXTRACTION     • COLON SURGERY     • COLONOSCOPY     • INCISION AND DRAINAGE POSTERIOR SPINE N/A 04/01/2022    Procedure: Posterior cervical evacuation of postoperative collection and debridement with placement of drains C3-T1; Surgeon: Russ German MD;  Location: [facility];  Service: Neurosurgery   • IR BIOPSY KIDNEY RANDOM  05/26/2022   • IR TUNNELED DIALYSIS CATHETER PLACEMENT  05/23/2022   • IR TUNNELED DIALYSIS CATHETER REMOVAL  06/02/2022   • GA ARTHRD ANT INTERBODY  [address] [facility] BELOW C2 N/A 03/11/2022    Procedure: Anterior cervical discectomy and fixation fusion C5/6 and C6/7; Posterior cervical decompression and instrumented fusion C3-T1; Surgeon: Russ German MD;  Location: [facility];  Service: Neurosurgery   • SPINE SURGERY  03/11/2022    Cervical myelopathy/ cervical fusion        Allergy:        Allergies   Allergen Reactions   • Morphine GI Intolerance   • Vitamin K Other (See Comments)     On coumadin-patient sensitive to vitamin K amounts in meds etc        Medications:       Prior to Admission medications    Medication Sig Start Date End Date Taking?  Authorizing Provider   acetaminophen (TYLENOL) 500 mg tablet Take 500 mg by mouth if needed for mild pain    Historical Provider, MD   atorvastatin (LIPITOR) 80 mg tablet Take 1 tablet (80 mg total) by mouth every evening 4/12/23   Truman Rodríguez MD   clopidogrel (PLAVIX) 75 mg tablet Take 1 tablet (75 mg total) by mouth daily 4/12/23   Ortega Lock MD   DULoxetine (CYMBALTA) 60 mg delayed release capsule Take 1 capsule (60 mg total) by mouth daily 2/9/23   Anam Bliss MD   epoetin asha (Epogen) 2,000 units/mL Inject under the skin every 14 (fourteen) days  Patient not taking: Reported on 4/18/2023 9/6/22   Historical Provider, MD   gabapentin (NEURONTIN) 300 mg capsule TAKE 1 CAPSULE BY MOUTH  DAILY AT BEDTIME 1/26/23   Brando Enriquez MD   methocarbamol (ROBAXIN) 750 mg tablet Take 1 tablet (750 mg total) by mouth every 6 (six) hours as needed for muscle spasms  Patient not taking: Reported on 4/18/2023 6/10/22   Brando Enriquez MD   metoprolol succinate (TOPROL-XL) 50 mg 24 hr tablet Take 1 tablet (50 mg total) by mouth daily 4/18/23   lGenis Bey MD   mometasone (ELOCON) 0 1 % cream Apply topically daily 3/22/23   Brando Enriquez MD   potassium chloride (K-DUR,KLOR-CON) 20 mEq tablet Take 1 tablet (20 mEq total) by mouth daily 4/12/23 5/12/23  Yasmani Lock MD   prednisoLONE acetate (PRED FORTE) 1 % ophthalmic suspension INSTILL ONE DROP INTO THE RIGHT EYE 4 TIMES A DAY 4/13/23   Historical Provider, MD   tamsulosin (FLOMAX) 0 4 mg TAKE 1 CAPSULE BY MOUTH  DAILY WITH DINNER 12/28/22   Brando Enriquez MD   torsemide (DEMADEX) 20 mg tablet TAKE 1 TABLET BY MOUTH EVERY DAY 2/1/23   Renata Covarrubias DO   warfarin (COUMADIN) 5 mg tablet TAKE 2 5MG (1/2 TAB) BY  MOUTH DAILY EXCEPT  WED   AND SAT TAKE 5MG ( 1 TAB) DAILY 4/3/23   Brando Enriquez MD   zolpidem (AMBIEN) 10 mg tablet Take 1 tablet (10 mg total) by mouth daily at bedtime as needed for sleep 2/2/23   Brando Enriquez MD   doxazosin (CARDURA) 1 mg tablet TAKE 1 TABLET BY MOUTH DAILY AT BEDTIME 2/9/23 4/18/23  Brando Enriquez MD   lisinopril (ZESTRIL) 2 5 mg tablet Take 1 tablet (2 5 mg total) by mouth daily Do not start before April 13, 2023 4/13/23 4/18/23  Glenis Bey MD   metoprolol succinate (TOPROL-XL) 100 mg 24 hr tablet TAKE 1 TABLET BY MOUTH DAILY 4/3/23 4/18/23  Brando Enriquez MD   metoprolol succinate (TOPROL-XL) 50 mg 24 hr tablet Take 2 tablets (100 mg total) by mouth daily 4/18/23 4/18/23  Glenis Bey MD Family History:     Family History   Problem Relation Age of Onset   • Lung cancer Mother    • Hearing loss Father    • Heart attack Brother    • Atrial fibrillation Brother    • Emphysema Sister         Social History:       Social History     Socioeconomic History   • Marital status: /Civil Union     Spouse name: Mihai Smith   • Number of children: 4   • Years of education: None   • Highest education level: None   Occupational History   • Occupation: Retired   Tobacco Use   • Smoking status: Never   • Smokeless tobacco: Never   Vaping Use   • Vaping Use: Never used   Substance and Sexual Activity   • Alcohol use: Not Currently     Alcohol/week: 0 0 standard drinks   • Drug use: No   • Sexual activity: Not Currently     Partners: Female   Other Topics Concern   • None   Social History Narrative    Caffeine use: 2 cups coffee a day     Social Determinants of Health     Financial Resource Strain: Low Risk    • Difficulty of Paying Living Expenses: Not hard at all   Food Insecurity: No Food Insecurity   • Worried About Running Out of Food in the Last Year: Never true   • Ran Out of Food in the Last Year: Never true   Transportation Needs: No Transportation Needs   • Lack of Transportation (Medical): No   • Lack of Transportation (Non-Medical): No   Physical Activity: Not on file   Stress: Not on file   Social Connections: Not on file   Intimate Partner Violence: Not on file   Housing Stability: Low Risk    • Unable to Pay for Housing in the Last Year: No   • Number of Places Lived in the Last Year: 1   • Unstable Housing in the Last Year: No       ROS:  14 point ROS negative except as outlined above  Remainder review of systems is negative    Exam:  General:  alert, oriented and in no distress, cooperative  Head: Normocephalic, atraumatic  Eyes:  EOMI  Pupils - equal, round, reactive to accomodation  No icterus  Normal Conjunctiva     Oropharynx: moist and normal-appearing mucosa  Neck: supple, symmetrical, trachea midline and no JVD  Heart: Irregular, rate controlled, No: murmer, rub or gallop, S1 & S2 normal   Respiratory effort / Chest Inspection: unlabored  Lungs:  normal air entry, lungs clear to auscultation and no rales, rhonchi or wheezing   Abdomen: flat, normal findings: bowel sounds normal and soft, non-tender  Lower Limbs:  no pitting edema  Pulses[de-identified]  RLE - DP: present 2+                 LLE - DP: present 2+  Musculoskeletal: ROM grossly normal        DATA:      ECG:                     Telemetry: Atrial fibrillation  HR 55-80          Echocardiogram:           Ischemic Testing:         Weights: Wt Readings from Last 3 Encounters:   04/18/23 112 kg (247 lb)   04/18/23 112 kg (247 lb)   04/10/23 113 kg (250 lb)   , Body mass index is 34 45 kg/m²           Lab Studies:             Results from last 7 days   Lab Units 04/18/23  1151 04/14/23  0000   WBC Thousand/uL 6 77 5 8   HEMOGLOBIN g/dL 9 7* 8 9   HEMATOCRIT % 30 3* 27   PLATELETS Thousands/uL 231 217   ,   Results from last 7 days   Lab Units 04/18/23  1124 04/14/23  0000 04/12/23  0502   POTASSIUM mmol/L 5 0 4 3 3 4*   CHLORIDE mmol/L 99 101 103   CO2 mmol/L 22  --  25   BUN mg/dL 51* 41 42*   CREATININE mg/dL 3 33* 2 77 3 07*   CALCIUM mg/dL 9 6 9 2 8 9   ALK PHOS U/L 82 89  --    ALT U/L 14 20  --    AST U/L 20 35  --

## 2023-04-18 NOTE — ASSESSMENT & PLAN NOTE
Wt Readings from Last 3 Encounters:   04/18/23 112 kg (247 lb)   04/18/23 112 kg (247 lb)   04/10/23 113 kg (250 lb)     · With ischemic cardiomyopathy and LifeVest placement  · Monitor on tele  · EF of 30% status post STEMI  · Daily weight  · Intake and output  · Monitor volume status with serial physical exam  · Holding diuretics and received fluids in ED for hypotension  · Anticipate decreasing torsemide to 10mg daily

## 2023-04-18 NOTE — ED PROVIDER NOTES
History  Chief Complaint   Patient presents with   • Hypotension     Patient presents to the ER wearing a life vest, being hypotensive post having a heart attack and being stented up in [place]  55-year-old male with history of chronic kidney disease, factor V Leiden, paroxysmal atrial fibrillation, CAD status post STEMI on April 9 of this year with a 99% occlusion of LAD  He received PCI with stents  Echocardiogram April 10 shows ejection fraction of 30 to 35% with right ventricular wall motion abnormality  He had nonsustained sustained V  tach episode and is wearing a LifeVest   He is combined systolic and diastolic heart failure  Amlodipine was discontinued due to hypotension but he continues on his lisinopril  Patient also has moderate aortic stenosis  He was seen by cardiology today for routine post discharge evaluation was found to have low blood pressure in the 21I systolic  He feels a little bit fatigued and lightheaded  Says he has felt this way before and this is not necessarily worse than those other episodes  Denies recent chest pain, palpitations, nausea and syncope  Here blood pressure is varying from 84 systolic to 546 systolic  Prior to Admission Medications   Prescriptions Last Dose Informant Patient Reported? Taking?    DULoxetine (CYMBALTA) 60 mg delayed release capsule   No No   Sig: Take 1 capsule (60 mg total) by mouth daily   acetaminophen (TYLENOL) 500 mg tablet   Yes No   Sig: Take 500 mg by mouth if needed for mild pain   atorvastatin (LIPITOR) 80 mg tablet   No No   Sig: Take 1 tablet (80 mg total) by mouth every evening   clopidogrel (PLAVIX) 75 mg tablet   No No   Sig: Take 1 tablet (75 mg total) by mouth daily   epoetin asha (Epogen) 2,000 units/mL   Yes No   Sig: Inject under the skin every 14 (fourteen) days   Patient not taking: Reported on 4/18/2023   gabapentin (NEURONTIN) 300 mg capsule   No No   Sig: TAKE 1 CAPSULE BY MOUTH  DAILY AT BEDTIME methocarbamol (ROBAXIN) 750 mg tablet   No No   Sig: Take 1 tablet (750 mg total) by mouth every 6 (six) hours as needed for muscle spasms   Patient not taking: Reported on 4/18/2023   metoprolol succinate (TOPROL-XL) 50 mg 24 hr tablet   No No   Sig: Take 1 tablet (50 mg total) by mouth daily   mometasone (ELOCON) 0 1 % cream   No No   Sig: Apply topically daily   potassium chloride (K-DUR,KLOR-CON) 20 mEq tablet   No No   Sig: Take 1 tablet (20 mEq total) by mouth daily   prednisoLONE acetate (PRED FORTE) 1 % ophthalmic suspension   Yes No   Sig: INSTILL ONE DROP INTO THE RIGHT EYE 4 TIMES A DAY   tamsulosin (FLOMAX) 0 4 mg   No No   Sig: TAKE 1 CAPSULE BY MOUTH  DAILY WITH DINNER   torsemide (DEMADEX) 20 mg tablet   No No   Sig: TAKE 1 TABLET BY MOUTH EVERY DAY   warfarin (COUMADIN) 5 mg tablet   No No   Sig: TAKE 2 5MG (1/2 TAB) BY  MOUTH DAILY EXCEPT  WED   AND SAT TAKE 5MG ( 1 TAB) DAILY   zolpidem (AMBIEN) 10 mg tablet   No No   Sig: Take 1 tablet (10 mg total) by mouth daily at bedtime as needed for sleep      Facility-Administered Medications: None       Past Medical History:   Diagnosis Date   • Acute venous embolism and thrombosis of deep vessels of proximal lower extremity (HCC)     Last assessed: 5/18/15   • Arthritis    • Cellulitis     LE   • Chronic kidney disease 3/2020    Acute Kidney Injury   • CPAP (continuous positive airway pressure) dependence    • Factor V Leiden ([facility])    • Forgetfulness    • Gross hematuria 5/17/2022   • Headache(784 0) 3/2022    Never till cervical  spine surgery   • Heart murmur 3/2020    Told when in hospital   • PATRICIA [facility] (hard of hearing)    • Hypoalbuminemia 5/11/2022   • Other acute osteomyelitis, other site ([facility] [address] ) 1/3/2023   • Paroxysmal atrial fibrillation ([facility] [address] )     Last assessed: 11/2/15   • Sleep apnea        Past Surgical History:   Procedure Laterality Date   • BACK SURGERY      Lower   • CARDIAC CATHETERIZATION N/A 4/9/2023    Procedure: Cardiac pci;  Surgeon: Enrico Be MD;  Location: [facility] CARD[facility]; Service: Cardiology   • CARDIAC CATHETERIZATION N/A 4/9/2023    Procedure: Cardiac Coronary Angiogram;  Surgeon: Enrico Be MD;  Location: [facility] CARD[facility]; Service: Cardiology   • CATARACT EXTRACTION     • COLON SURGERY     • COLONOSCOPY     • INCISION AND DRAINAGE POSTERIOR SPINE N/A 04/01/2022    Procedure: Posterior cervical evacuation of postoperative collection and debridement with placement of drains C3-T1; Surgeon: Wilmer White MD;  Location: [facility] MAIN OR;  Service: Neurosurgery   • IR BIOPSY KIDNEY RANDOM  05/26/2022   • IR TUNNELED DIALYSIS CATHETER PLACEMENT  05/23/2022   • IR TUNNELED DIALYSIS CATHETER REMOVAL  06/02/2022   • NV ARTHRD ANT INTERBODY  [address] CRV BELOW C2 N/A 03/11/2022    Procedure: Anterior cervical discectomy and fixation fusion C5/6 and C6/7; Posterior cervical decompression and instrumented fusion C3-T1; Surgeon: Wilmer White MD;  Location: [facility] MAIN [facility];  Service: Neurosurgery   • SPINE SURGERY  03/11/2022    Cervical myelopathy/ cervical fusion       Family History   Problem Relation Age of Onset   • Lung cancer Mother    • Hearing loss Father    • Heart attack Brother    • Atrial fibrillation Brother    • Emphysema Sister      I have reviewed and agree with the history as documented  E-Cigarette/Vaping   • E-Cigarette Use Never User      E-Cigarette/Vaping Substances   • Nicotine No    • THC No    • CBD No    • Flavoring No    • Other No    • Unknown No      Social History     Tobacco Use   • Smoking status: Never   • Smokeless tobacco: Never   Vaping Use   • Vaping Use: Never used   Substance Use Topics   • Alcohol use: Not Currently     Alcohol/week: 0 0 standard drinks   • Drug use: No       Review of Systems   Constitutional: Positive for fatigue  Negative for fever  HENT: Negative for nosebleeds  Respiratory: Negative for cough and shortness of breath      Cardiovascular: Negative for chest pain, palpitations and leg swelling  Gastrointestinal: Negative for abdominal pain, blood in stool, diarrhea and vomiting  Genitourinary: Negative for hematuria  Musculoskeletal: Positive for myalgias  Skin: Positive for pallor  Negative for wound  Neurological: Positive for light-headedness and numbness ( Right index finger since right radial artery catheterization)  Negative for dizziness, syncope and headaches  All other systems reviewed and are negative  Physical Exam  Physical Exam  Vitals and nursing note reviewed  Constitutional:       General: He is not in acute distress  Appearance: He is well-developed  He is not ill-appearing or diaphoretic  HENT:      Head: Normocephalic and atraumatic  Right Ear: External ear normal       Left Ear: External ear normal    Eyes:      General: No scleral icterus  Conjunctiva/sclera: Conjunctivae normal       Pupils: Pupils are equal, round, and reactive to light  Neck:      Vascular: No JVD  Cardiovascular:      Rate and Rhythm: Normal rate and regular rhythm  Pulses: Normal pulses  Heart sounds: Normal heart sounds  No murmur heard  Pulmonary:      Effort: Pulmonary effort is normal       Breath sounds: Normal breath sounds  Abdominal:      General: Bowel sounds are normal       Palpations: Abdomen is soft  There is no mass  Tenderness: There is no abdominal tenderness  There is no guarding or rebound  Musculoskeletal:         General: Swelling ( Swelling and ecchymosis of the right forearm, volar aspect of mid to distal radius  No compartment syndrome ) and tenderness ( Right radial artery) present  No signs of injury  Normal range of motion  Cervical back: Normal range of motion and neck supple  Right lower leg: No edema  Left lower leg: No edema  Lymphadenopathy:      Cervical: No cervical adenopathy  Skin:     General: Skin is warm and dry        Capillary Refill: Capillary refill takes less than 2 seconds  Findings: Bruising present  No rash  Neurological:      General: No focal deficit present  Mental Status: He is alert and oriented to person, place, and time  Mental status is at baseline  Cranial Nerves: No cranial nerve deficit  Coordination: Coordination normal       Deep Tendon Reflexes: Reflexes are normal and symmetric     Psychiatric:         Mood and Affect: Mood normal          Behavior: Behavior normal          Vital Signs  ED Triage Vitals [04/18/23 1055]   Temperature Pulse Respirations Blood Pressure SpO2   98 3 °F (36 8 °C) (!) 43 18 (!) 84/51 98 %      Temp Source Heart Rate Source Patient Position - Orthostatic VS BP Location FiO2 (%)   Oral Monitor Sitting Left arm --      Pain Score       No Pain           Vitals:    04/18/23 1330 04/18/23 1345 04/18/23 1400 04/18/23 1415   BP: 118/77 115/66 126/66 115/67   Pulse: 63 62 59 59   Patient Position - Orthostatic VS:             Visual Acuity      ED Medications  Medications   acetaminophen (TYLENOL) tablet 500 mg (has no administration in time range)   atorvastatin (LIPITOR) tablet 80 mg (has no administration in time range)   clopidogrel (PLAVIX) tablet 75 mg (has no administration in time range)   DULoxetine (CYMBALTA) delayed release capsule 60 mg (has no administration in time range)   gabapentin (NEURONTIN) capsule 300 mg (has no administration in time range)   prednisoLONE acetate (PRED FORTE) 1 % ophthalmic suspension 1 drop (has no administration in time range)   tamsulosin (FLOMAX) capsule 0 4 mg (has no administration in time range)   warfarin (COUMADIN) tablet 2 5 mg (has no administration in time range)   zolpidem (AMBIEN) tablet 10 mg (has no administration in time range)   triamcinolone (KENALOG) 0 1 % cream (has no administration in time range)   metoprolol succinate (TOPROL-XL) 24 hr tablet 25 mg (has no administration in time range)   sodium chloride 0 9 % bolus 250 mL (0 mL Intravenous Stopped 4/18/23 1326)       Diagnostic Studies  Results Reviewed     Procedure Component Value Units Date/Time    HS Troponin I 2hr [[id number]]  (Abnormal) Collected: 04/18/23 1337    Lab Status: Final result Specimen: Blood from Arm, Right Updated: 04/18/23 1419     hs TnI 2hr 285 ng/L      Delta 2hr hsTnI -50 ng/L     Protime-INR [[id number]]  (Abnormal) Collected: 04/18/23 1151    Lab Status: Final result Specimen: Blood from Arm, Right Updated: 04/18/23 1240     Protime 29 6 seconds      INR 2 65    APTT [[id number]]  (Abnormal) Collected: 04/18/23 1151    Lab Status: Final result Specimen: Blood from Arm, Right Updated: 04/18/23 1240     PTT 46 seconds     Urine Microscopic [[id number]]  (Normal) Collected: 04/18/23 1151    Lab Status: Final result Specimen: Urine, Clean Catch Updated: 04/18/23 1227     RBC, UA 2-4 /hpf      WBC, UA 0-1 /hpf      Epithelial Cells None Seen /hpf      Bacteria, UA Occasional /hpf     UA (URINE) with reflex to Scope [[id number]]  (Abnormal) Collected: 04/18/23 1151    Lab Status: Final result Specimen: Urine, Clean Catch Updated: 04/18/23 1226     Color, UA Yellow     Clarity, UA Clear     Specific Gravity, UA 1 015     pH, UA 5 0     Leukocytes, UA Negative     Nitrite, UA Negative     Protein, UA Negative mg/dl      Glucose, UA Negative mg/dl      Ketones, UA Negative mg/dl      Urobilinogen, UA <2 0 mg/dl      Bilirubin, UA Negative     Occult Blood, UA Small    HS Troponin I 4hr [[id number]]     Lab Status: No result Specimen: Blood     HS Troponin 0hr (reflex protocol) [[id number]]  (Abnormal) Collected: 04/18/23 1124    Lab Status: Final result Specimen: Blood from Arm, Left Updated: 04/18/23 1200     hs TnI 0hr 335 ng/L     CBC and differential [[id number]]  (Abnormal) Collected: 04/18/23 1151    Lab Status: Final result Specimen: Blood from Arm, Right Updated: 04/18/23 1157     WBC 6 77 Thousand/uL      RBC 3 31 Million/uL      Hemoglobin 9 7 g/dL      Hematocrit 30 3 %      MCV 92 fL MCH 29 3 pg      MCHC 32 0 g/dL      RDW 13 5 %      MPV 9 7 fL      Platelets 941 Thousands/uL      nRBC 0 /100 WBCs      Neutrophils Relative 75 %      Immat GRANS % 0 %      Lymphocytes Relative 17 %      Monocytes Relative 7 %      Eosinophils Relative 1 %      Basophils Relative 0 %      Neutrophils Absolute 5 04 Thousands/µL      Immature Grans Absolute 0 02 Thousand/uL      Lymphocytes Absolute 1 14 Thousands/µL      Monocytes Absolute 0 47 Thousand/µL      Eosinophils Absolute 0 07 Thousand/µL      Basophils Absolute 0 03 Thousands/µL     Comprehensive metabolic panel [[id number]]  (Abnormal) Collected: 04/18/23 1124    Lab Status: Final result Specimen: Blood from Arm, Left Updated: 04/18/23 1156     Sodium 133 mmol/L      Potassium 5 0 mmol/L      Chloride 99 mmol/L      CO2 22 mmol/L      ANION GAP 12 mmol/L      BUN 51 mg/dL      Creatinine 3 33 mg/dL      Glucose 112 mg/dL      Calcium 9 6 mg/dL      AST 20 U/L      ALT 14 U/L      Alkaline Phosphatase 82 U/L      Total Protein 7 8 g/dL      Albumin 4 2 g/dL      Total Bilirubin 0 63 mg/dL      eGFR 17 ml/min/1 73sq m     Narrative:      [facility] guidelines for Chronic Kidney Disease (CKD):   •  Stage 1 with normal or high GFR (GFR > 90 mL/min/1 73 square meters)  •  Stage 2 Mild CKD (GFR = 60-89 mL/min/1 73 square meters)  •  Stage 3A Moderate CKD (GFR = 45-59 mL/min/1 73 square meters)  •  Stage 3B Moderate CKD (GFR = 30-44 mL/min/1 73 square meters)  •  Stage 4 Severe CKD (GFR = 15-29 mL/min/1 73 square meters)  •  Stage 5 End Stage CKD (GFR <15 mL/min/1 73 square meters)  Note: GFR calculation is accurate only with a steady state creatinine    Magnesium [[id number]]  (Normal) Collected: 04/18/23 1124    Lab Status: Final result Specimen: Blood from Arm, Left Updated: 04/18/23 1156     Magnesium 2 2 mg/dL     Phosphorus [[id number]]  (Abnormal) Collected: 04/18/23 1124    Lab Status: Final result Specimen: Blood from Arm, Left Updated: 04/18/23 1156     Phosphorus 4 3 mg/dL                  VAS upper limb arterial duplex, unilateral/limited, graft   Final Result by Cecilia Godoy MD (04/18 1416)      XR chest 1 view portable    (Results Pending)              Procedures  ECG 12 Lead Documentation Only    Date/Time: 4/18/2023 11:11 AM  Performed by: Jc Kilgore DO  Authorized by: Jc Kilgore DO     ECG reviewed by me, the ED Provider: yes    Patient location:  ED  Previous ECG:     Previous ECG:  Compared to current    Comparison ECG info:  Serial changes of septal infarct are present    Similarity:  Changes noted  Rhythm:     Rhythm: atrial fibrillation    Ectopy:     Ectopy: aberrant    QRS:     QRS axis:  Right    QRS intervals: Wide  Conduction:     Conduction: abnormal      Abnormal conduction: complete RBBB    ST segments:     ST segments:  Non-specific  T waves:     T waves: inverted      Inverted:  III, II, aVF, V3 and V2  Q waves:     Q waves:  V2             ED Course  ED Course as of 04/18/23 1602   Tue Apr 18, 2023   1300 Texted cardiology   [phone] Cardiology requests admit to [address] [place] [address] texted for admission                               SBIRT 22yo+    Flowsheet Row Most Recent Value   Initial Alcohol Screen: US AUDIT-C     1  How often do you have a drink containing alcohol? 0 Filed at: 04/18/2023 1113   2  How many drinks containing alcohol do you have on a typical day you are drinking? 0 Filed at: 04/18/2023 1113   3a  Male UNDER 65: How often do you have five or more drinks on one occasion? 0 Filed at: 04/18/2023 1113   3b  FEMALE Any Age, or MALE 65+: How often do you have 4 or more drinks on one occassion? 0 Filed at: 04/18/2023 1113   Audit-C Score 0 Filed at: 04/18/2023 1113   JONATHAN: How many times in the past year have you    Used an illegal drug or used a prescription medication for non-medical reasons?  Never Filed at: 04/18/2023 1113                    Medical Decision Making  16-year-old male with recent STEMI status post stent, ischemic cardiomyopathy with ejection fraction approximately 30 to 35% presents with hypotension  Concern for ACS, cardiac dysrhythmia, dehydration, electrolyte abnormality, anemia, hemorrhage  No chest pain, palpitations or new dyspnea  Will check labs  Patient had right ventricular wall motion abnormalities  Will give a small aliquot of saline, 250 cc, and discuss with cardiology  Amount and/or Complexity of Data Reviewed  Labs: ordered  Radiology: ordered  Disposition  Final diagnoses:   Ischemic cardiomyopathy   Coronary artery disease involving native coronary artery of native heart without angina pectoris   Hypotension     Time reflects when diagnosis was documented in both MDM as applicable and the Disposition within this note     Time User Action Codes Description Comment    4/18/2023  1:18 PM Marine Copier Add [I25 5] Ischemic cardiomyopathy     4/18/2023  1:18 PM Melvi Tanner Add [I25 10] Coronary artery disease involving native coronary artery of native heart without angina pectoris     4/18/2023  1:18 PM Marine Copier Add [Q00 134] Great toe pain, right     4/18/2023  1:35 PM Marine Copier Remove [H67 389] Great toe pain, right     4/18/2023  1:35 PM Marine Copier Add [I95 9] Hypotension     4/18/2023  2:41 PM Bill Orozco Add [E66 01] Obesity, morbid [facility])       ED Disposition     ED Disposition   Admit    Condition   Stable    Date/Time   Tue Apr 18, 2023  1:35 PM    Comment   Case was discussed with Dr Shan Kim and the patient's admission status was agreed to be Admission Status: observation status to the service of Dr Shan Kim   Follow-up Information    None         Patient's Medications   Discharge Prescriptions    No medications on file       No discharge procedures on file      PDMP Review       Value Time User    PDMP Reviewed  Yes 2/2/2023 11:14 AM Chase Gong MD          ED Provider  Electronically Signed by           Anne Nesbitt DO  04/18/23 1917

## 2023-04-18 NOTE — ASSESSMENT & PLAN NOTE
· Discharged on 4/12 for STEMI status post cardiac catheterization found to have mid LAD 99% stenosis status post PCI x2  · See plan as noted above

## 2023-04-18 NOTE — ASSESSMENT & PLAN NOTE
Background: Presented to the outpatient office on 4/18 and recommended to come to the ER for evaluation given ongoing hypotension thought to be secondary to new cardiomyopathy and inability to tolerate higher antihypertensive regimen with new reduced ejection fraction    · Symptoms associated include fatigue and lightheadedness however denied chest pain, palpitations, nausea, or episodes of syncope  · EKG  · Chest x-ray  · Cardiology consulted for assistance with medication regimen  · Monitor with routine vitals

## 2023-04-18 NOTE — ASSESSMENT & PLAN NOTE
· TTE from 4/10 with LVEF of 30 to 35% and apical hypokinesis  · LifeVest continuous telemetry  · Continue diuretic with 20 mg Demadex daily

## 2023-04-18 NOTE — ASSESSMENT & PLAN NOTE
· 3 33 on admission due to hypotension and diuretic use  · Baseline appears to be 2 6 - 3 0  · S/P IVF in ED  · Holding Lisinopril and diuretics  · Decrease metoprolol to 25mg daily

## 2023-04-19 ENCOUNTER — TRANSITIONAL CARE MANAGEMENT (OUTPATIENT)
Dept: FAMILY MEDICINE CLINIC | Facility: CLINIC | Age: 73
End: 2023-04-19

## 2023-04-19 VITALS
OXYGEN SATURATION: 95 % | RESPIRATION RATE: 14 BRPM | HEART RATE: 60 BPM | DIASTOLIC BLOOD PRESSURE: 60 MMHG | TEMPERATURE: 97.6 F | BODY MASS INDEX: 34.35 KG/M2 | HEIGHT: 71 IN | WEIGHT: 245.4 LBS | SYSTOLIC BLOOD PRESSURE: 111 MMHG

## 2023-04-19 LAB
ANION GAP SERPL CALCULATED.3IONS-SCNC: 10 MMOL/L (ref 4–13)
BASOPHILS # BLD AUTO: 0.04 THOUSANDS/ΜL (ref 0–0.1)
BASOPHILS NFR BLD AUTO: 1 % (ref 0–1)
BUN SERPL-MCNC: 57 MG/DL (ref 5–25)
CALCIUM SERPL-MCNC: 9.2 MG/DL (ref 8.4–10.2)
CHLORIDE SERPL-SCNC: 101 MMOL/L (ref 96–108)
CO2 SERPL-SCNC: 23 MMOL/L (ref 21–32)
CREAT SERPL-MCNC: 3.15 MG/DL (ref 0.6–1.3)
EOSINOPHIL # BLD AUTO: 0.14 THOUSAND/ΜL (ref 0–0.61)
EOSINOPHIL NFR BLD AUTO: 2 % (ref 0–6)
ERYTHROCYTE [DISTWIDTH] IN BLOOD BY AUTOMATED COUNT: 13.7 % (ref 11.6–15.1)
GFR SERPL CREATININE-BSD FRML MDRD: 18 ML/MIN/1.73SQ M
GLUCOSE SERPL-MCNC: 100 MG/DL (ref 65–140)
HCT VFR BLD AUTO: 27.9 % (ref 36.5–49.3)
HGB BLD-MCNC: 9 G/DL (ref 12–17)
IMM GRANULOCYTES # BLD AUTO: 0.04 THOUSAND/UL (ref 0–0.2)
IMM GRANULOCYTES NFR BLD AUTO: 1 % (ref 0–2)
INR PPP: 3.13 (ref 0.84–1.19)
LYMPHOCYTES # BLD AUTO: 1.9 THOUSANDS/ΜL (ref 0.6–4.47)
LYMPHOCYTES NFR BLD AUTO: 26 % (ref 14–44)
MCH RBC QN AUTO: 29.5 PG (ref 26.8–34.3)
MCHC RBC AUTO-ENTMCNC: 32.3 G/DL (ref 31.4–37.4)
MCV RBC AUTO: 92 FL (ref 82–98)
MONOCYTES # BLD AUTO: 0.67 THOUSAND/ΜL (ref 0.17–1.22)
MONOCYTES NFR BLD AUTO: 9 % (ref 4–12)
NEUTROPHILS # BLD AUTO: 4.61 THOUSANDS/ΜL (ref 1.85–7.62)
NEUTS SEG NFR BLD AUTO: 61 % (ref 43–75)
NRBC BLD AUTO-RTO: 0 /100 WBCS
PLATELET # BLD AUTO: 225 THOUSANDS/UL (ref 149–390)
PMV BLD AUTO: 10 FL (ref 8.9–12.7)
POTASSIUM SERPL-SCNC: 4.2 MMOL/L (ref 3.5–5.3)
PROTHROMBIN TIME: 33.7 SECONDS (ref 11.6–14.5)
RBC # BLD AUTO: 3.05 MILLION/UL (ref 3.88–5.62)
SODIUM SERPL-SCNC: 134 MMOL/L (ref 135–147)
WBC # BLD AUTO: 7.4 THOUSAND/UL (ref 4.31–10.16)

## 2023-04-19 RX ORDER — METOPROLOL SUCCINATE 25 MG/1
25 TABLET, EXTENDED RELEASE ORAL DAILY
Qty: 30 TABLET | Refills: 0 | Status: SHIPPED | OUTPATIENT
Start: 2023-04-20 | End: 2023-04-25 | Stop reason: SDUPTHER

## 2023-04-19 RX ORDER — TORSEMIDE 10 MG/1
10 TABLET ORAL DAILY
Qty: 30 TABLET | Refills: 0 | Status: SHIPPED | OUTPATIENT
Start: 2023-04-19 | End: 2023-05-03 | Stop reason: SDUPTHER

## 2023-04-19 RX ORDER — POTASSIUM CHLORIDE 750 MG/1
10 CAPSULE, EXTENDED RELEASE ORAL 2 TIMES DAILY
Qty: 30 CAPSULE | Refills: 0 | Status: SHIPPED | OUTPATIENT
Start: 2023-04-19 | End: 2023-05-03 | Stop reason: SDUPTHER

## 2023-04-19 RX ADMIN — METOPROLOL SUCCINATE 25 MG: 25 TABLET, EXTENDED RELEASE ORAL at 09:17

## 2023-04-19 RX ADMIN — PREDNISOLONE ACETATE 1 DROP: 10 SUSPENSION/ DROPS OPHTHALMIC at 09:34

## 2023-04-19 RX ADMIN — DULOXETINE HYDROCHLORIDE 60 MG: 30 CAPSULE, DELAYED RELEASE ORAL at 09:17

## 2023-04-19 RX ADMIN — CLOPIDOGREL BISULFATE 75 MG: 75 TABLET ORAL at 09:17

## 2023-04-19 NOTE — ASSESSMENT & PLAN NOTE
Wt Readings from Last 3 Encounters:   04/19/23 111 kg (245 lb 6 4 oz)   04/18/23 112 kg (247 lb)   04/10/23 113 kg (250 lb)     · With ischemic cardiomyopathy and LifeVest placement  · Monitor on tele  · EF of 30% status post STEMI  · Daily weight  · Intake and output  · Monitor volume status with serial physical exam  · Holding diuretics and received fluids in ED for hypotension  · Decrease torsemide to 10mg daily on discharge

## 2023-04-19 NOTE — UTILIZATION REVIEW
Initial Clinical Review    Admission: Date/Time/Statement: 4/1823 1336 observation   Admission Orders (From admission, onward)     Ordered        04/18/23 1336  Place in Observation  Once                      Orders Placed This Encounter   Procedures   • Place in Observation     Standing Status:   Standing     Number of Occurrences:   1     Order Specific Question:   Level of Care     Answer:   Med Surg [16]     ED Arrival Information     Expected   -    Arrival   4/18/2023 10:40    Acuity   Emergent            Means of arrival   Walk-In    Escorted by   Self    Service   Hospitalist    Admission type   Emergency            Arrival complaint   Hypotension            Chief Complaint   Patient presents with   • Hypotension     Patient presents to the ER wearing a life vest, being hypotensive post having a heart attack and being stented up in [place]  Initial Presentation: 68 y o  male from home to ED, per Cardiology,  admitted to observation due to  Hypotension/JANEL on CKD  PMH Of MI, CHF with EF of 30%, Afib  Presented due to SBP in 80s at Cardiology on day of arrival   Feels fatigued and lightheaded  On exam SBP 84  Swelling and ecchymosis of right forearm, volar aspect of mid to distal radius  Tenderness of right radial artery  INR 2 65 on warfarin  Hs Tnl 335> 285/-50  H&H 9 7/30 3  Bun 51, creatinine 3 33 wit baseline of 2 6 - 3  In the ED given IVF of 250 ml  Plan is hold home lisinopril and torsemide  Trend BMP  Decrease home metoprolol  Consult Cardiology  Telemetry  4/18/23 per Cardiology - Patient with recent anterior STEMI, EF of 35% with LifeVest, presented with hypotension and treated with IVF  Plan is hold current medications of lisinopril, Toresemide  Continue Plavix, warfarin and statin       Date:    Day 2:     ED Triage Vitals [04/18/23 1055]   Temperature Pulse Respirations Blood Pressure SpO2   98 3 °F (36 8 °C) (!) 43 18 (!) 84/51 98 %      Temp Source Heart Rate Source Patient Position - Orthostatic VS BP Location FiO2 (%)   Oral Monitor Sitting Left arm --      Pain Score       No Pain          Wt Readings from Last 1 Encounters:   04/19/23 111 kg (245 lb 6 4 oz)     Additional Vital Signs:   04/19/23 07:21:22 97 6 °F (36 4 °C) 60 14 111/60 77 95 % -- --   04/18/23 22:35:22 97 9 °F (36 6 °C) 71 18 118/68 85 96 % None (Room air) --   04/18/23 22:33:53 97 9 °F (36 6 °C) -- -- 118/68 85 -- -- --   04/18/23 22:21:16 -- -- 18 118/68 85 -- -- --   04/18/23 2000 -- 66 18 95/53 68 99 % None (Room air) Lying   04/18/23 1800 -- 59 15 106/58 76 98 % -- --   04/18/23 1600 -- 61 19 100/59 77 100 % -- --   04/18/23 1400 -- 59 15 126/66 90 100 % -- --   04/18/23 1300 -- 72 21 109/71 85 93 % None (Room air) --   04/18/23 1245 -- 64 -- 117/65 86 93 % -- --   04/18/23 1130 -- 110 Abnormal  22 92/64 74 91 % None (Room air) --   04/18/23 1115 -- 110 Abnormal  25 Abnormal  83/63 Abnormal  68 --       Pertinent Labs/Diagnostic Test Results:   VAS upper limb arterial duplex, unilateral/limited, graft   Final Result by Yeison Alonso MD (04/18 2349)   RIGHT UPPER LIMB:   This resting evaluation shows no evidence of significant upper extremity   arterial occlusive disease  Unable to obtain a Forearm/Upper arm index due to non-compressible vessels  Digit pressure of 97mmHg, which is within limits for healing potential        LEFT UPPER LIMB:   Forearm/Upper arm index:  1 35 , which is in the normal category  Digit pressure of 84 mmHg, which is within limits for healing potential     XR chest 1 view portable   Final Result by Pelon Finn MD (04/18 6229)      No acute cardiopulmonary disease                    Workstation performed: [id number]             4/18/23 ecg - Atrial fibrillation with premature ventricular or aberrantly conducted complexes   Right bundle branch block   Left posterior fascicular block   ** Bifascicular block **   Septal infarct (cited on or before 09-APR-2023)   T wave abnormality, consider inferior ischemia   Abnormal ECG   When compared with ECG of 09-APR-2023 23:04,   No significant change since prior tracing     4/18/23 ecg Atrial fibrillation with rapid ventricular response with premature ventricular or aberrantly conducted complexes   Right bundle branch block   Nonspecific ST and T wave abnormality   Abnormal ECG   When compared with ECG of 18-APR-2023 11:08, (unconfirmed)   No significant change since prior tracing     Results from last 7 days   Lab Units 04/19/23  0538 04/18/23  1151 04/14/23  0000   WBC Thousand/uL 7 40 6 77 5 8   HEMOGLOBIN g/dL 9 0* 9 7* 8 9   HEMATOCRIT % 27 9* 30 3* 27   PLATELETS Thousands/uL 225 231 217   NEUTROS ABS Thousands/µL 4 61 5 04  --    TOTAL NEUT ABS   --   --  3 9     Results from last 7 days   Lab Units 04/19/23  0538 04/18/23  1124 04/14/23  0000   SODIUM mmol/L 134* 133* 138   POTASSIUM mmol/L 4 2 5 0 4 3   CHLORIDE mmol/L 101 99 101   CO2 mmol/L 23 22  --    ANION GAP mmol/L 10 12  --    BUN mg/dL 57* 51* 41   CREATININE mg/dL 3 15* 3 33* 2 77   EGFR ml/min/1 73sq m 18 17  --    CALCIUM mg/dL 9 2 9 6 9 2   MAGNESIUM mg/dL  --  2 2  --    PHOSPHORUS mg/dL  --  4 3* 3 1     Results from last 7 days   Lab Units 04/18/23  1124 04/14/23  0000   AST U/L 20 35   ALT U/L 14 20   ALK PHOS U/L 82 89   TOTAL PROTEIN g/dL 7 8 6 7   ALBUMIN g/dL 4 2 4 0   TOTAL BILIRUBIN mg/dL 0 63  --          Results from last 7 days   Lab Units 04/19/23  0538 04/18/23  1124 04/14/23  0000   GLUCOSE RANDOM mg/dL 100 112 93     Results from last 7 days   Lab Units 04/18/23  1616 04/18/23  1337 04/18/23  1124   HS TNI 0HR ng/L  --   --  335*   HS TNI 2HR ng/L  --  285*  --    HSTNI D2 ng/L  --  -50  --    HS TNI 4HR ng/L 245*  --   --    HSTNI D4 ng/L -90  --   --      Results from last 7 days   Lab Units 04/19/23  0538 04/18/23  1151 04/14/23  1318   PROTIME seconds 33 7* 29 6* 31 4*   INR  3 13* 2 65* 3 2*   PTT seconds  --  46*  --      Results from last 7 days   Lab Units 04/18/23  1151 04/14/23  0000   CLARITY UA  Clear  --    COLOR UA  Yellow Yellow   SPEC GRAV UA  1 015  --    SPEC GRAV US   --  1 008   PH UA  5 0 6 0   GLUCOSE UA mg/dl Negative Negative   KETONES UA mg/dl Negative Negative   BLOOD UA  Small* Trace abnormal   PROTEIN UA mg/dl Negative Negative   NITRITE UA  Negative Negative   BILIRUBIN UA  Negative  --    BILIRUBIN, UA   --  Negative   UROBILINOGEN UA   --  0 2   UROBILINOGEN UA (BE) mg/dl <2 0  --    LEUKOCYTES UA  Negative  --    WBC UA /hpf 0-1 None seen   RBC UA /hpf 2-4 3-10   BACTERIA UA /hpf Occasional None seen   EPITHELIAL CELLS WET PREP /hpf None Seen  --    CREATININE UR   --  43 6       ED Treatment:   Medication Administration from 04/18/2023 1040 to 04/18/2023 2212       Date/Time Order Dose Route Action Comments     04/18/2023 1242 EDT sodium chloride 0 9 % bolus 250 mL 250 mL Intravenous New Bag --     04/18/2023 1708 EDT atorvastatin (LIPITOR) tablet 80 mg 80 mg Oral Given --     04/18/2023 1619 EDT prednisoLONE acetate (PRED FORTE) 1 % ophthalmic suspension 1 drop 1 drop Right Eye Given --     04/18/2023 1618 EDT tamsulosin (FLOMAX) capsule 0 4 mg 0 4 mg Oral Given --     04/18/2023 1735 EDT triamcinolone (KENALOG) 0 1 % cream -- Topical Given --        Past Medical History:   Diagnosis Date   • Acute venous embolism and thrombosis of deep vessels of proximal lower extremity (HCC)     Last assessed: 5/18/15   • Arthritis    • Cellulitis     LE   • Chronic kidney disease 3/2020    Acute Kidney Injury   • CPAP (continuous positive airway pressure) dependence    • Factor V Leiden ([facility])    • Forgetfulness    • Gross hematuria 5/17/2022   • Headache(784 0) 3/2022    Never till cervical  spine surgery   • Heart murmur 3/2020    Told when in hospital   • PATRICIA [facility] (hard of hearing)    • Hypoalbuminemia 5/11/2022   • Other acute osteomyelitis, other site ([facility] [address] ) 1/3/2023   • Paroxysmal atrial fibrillation (HCC)     Last assessed: 11/2/15   • Sleep apnea      Present on Admission:  • Coronary artery disease involving native coronary artery of native heart without angina pectoris  • Paroxysmal A-fib (HCC)  • Chronic diastolic (congestive) heart failure (HCC)  • Anemia due to stage 4 chronic kidney disease (HCC)  • Ischemic cardiomyopathy  • Hypotension  • JANEL (acute kidney injury) ([facility] [address] )      Admitting Diagnosis: Hypotension [I95 9]  [facility], morbid ([facility] ) [E66 01]  Ischemic cardiomyopathy [I25 5]  Coronary artery disease involving native coronary artery of native heart without angina pectoris [I25 10]  Age/Sex: 68 y o  male  Admission Orders:  Scheduled Medications:  atorvastatin, 80 mg, Oral, QPM  clopidogrel, 75 mg, Oral, Daily  DULoxetine, 60 mg, Oral, Daily  gabapentin, 300 mg, Oral, HS  metoprolol succinate, 25 mg, Oral, Daily  prednisoLONE acetate, 1 drop, Right Eye, TID  tamsulosin, 0 4 mg, Oral, Daily With Dinner  triamcinolone, , Topical, BID  warfarin, 2 5 mg, Oral, Daily (warfarin)      Continuous IV Infusions:     PRN Meds:  acetaminophen, 650 mg, Oral, PRN  zolpidem, 10 mg, Oral, HS PRN      Life Vest Patient - telemetry  IP CONSULT TO CARDIOLOGY      Network Utilization Review Department  ATTENTION: Please call with any questions or concerns to [phone] and carefully listen to the prompts so that you are directed to the right person  All voicemails are confidential   [place] all requests for admission clinical reviews, approved or denied determinations and any other requests to dedicated fax number below belonging to the campus where the patient is receiving treatment   List of dedicated fax numbers for the Facilities:  [address] DENIALS (Administrative/Medical Necessity) [phone]   [address] [place] [address] (Maternity/NICU/Pediatrics) [phone]   [address] [phone]   [place] [phone]     [address] 150 [facility] [place] 435 [facility] [place] [address] [facility] 28 U [facility] 310 [facility] 134 [address] [phone]

## 2023-04-19 NOTE — ACP (ADVANCE CARE PLANNING)
Advanced Care Planning Progress Note    Serious Illness Conversation    1  What is your understanding now of where you are with your illness? Prognostic Understanding: appropriate understanding of prognosis     2  How much information about what is likely to be ahead with your illness would you like to have? Information: patient wants to be fully informed     3  What did you (clinician) communicate to the patient? Prognostic Communication: Uncertain - It can be difficult to predict what will happen with your illness  I hope you will continue to live well for a long time but I’m worried that you could get sick quickly, and I think it is important to prepare for that possibility  4  If your health situation worsens, what are your most important goals? Goals: be at home, be independent, provide support for family  Being with wife  5  What are the biggest fears and worries about the future and your health? Fears/Worries: being dependent, emotional concerns  Not being with wife who has terminal diagnosis     6  What abilities are so critical to your life that you cannot imagine living without them? Unacceptable Function: not being myself  Not being able to take care of wife  Continuing to be limited due to balance and knee pain     7  What gives you strength as you think about the future with your illness? Taking care of wife, having four sons and two grandchildren that he is proud of     6  If you become sicker, how much are you willing to go through for the possibility of gaining more time? Be in the hospital: Yes    Be in the ICU: Yes    Would prefer to avoid dialysis  When this becomes a possibility   9  How much does your proxy and family know about your priorities and wishes? Wants wife informed, she keeps all of his medical records     I’ve heard you say that being home and taking care of your wife is really important to you, and continuing to be outdoors fishing and hunting   Keeping that in mind, and what we know about your illness, I recommend that we optimize your medication regimen to try to get you stable hopefully follow up for knee surgery ultimately  This will help us make sure that your treatment plans reflect what’s important to you  How does this plan sound to you? I will do everything I can to help you through this    Patient verbalized understanding of the plan     I have spent 32 minutes speaking with my patient on advanced care planning today or during this visit     Advanced directives         Ramona Dalal PA-C

## 2023-04-19 NOTE — ASSESSMENT & PLAN NOTE
Background: Presented to the outpatient office on 4/18 and recommended to come to the ER for evaluation given ongoing hypotension thought to be secondary to new cardiomyopathy and inability to tolerate higher antihypertensive regimen with new reduced ejection fraction    · Symptoms associated include fatigue and lightheadedness however denied chest pain, palpitations, nausea, or episodes of syncope  · EKG  · Chest x-ray unremarkable  · Cardiology consulted for assistance with medication regimen  · Discontinue lisinopril  · Reduce metoprolol to 25 mg daily  · Reduce Demadex to 10 mg daily  · Monitor with routine vitals

## 2023-04-19 NOTE — DISCHARGE SUMMARY
[place]  Discharge- Scarlett Baez 1950, 68 y o  male MRN: [id number]  Unit/Bed#: -01 Encounter: [id number]  Primary Care Provider: Wood Dominguez MD   Date and time admitted to hospital: 4/18/2023 10:56 AM    JANEL (acute kidney injury) [facility])  Assessment & Plan  · 3 33 on admission due to hypotension and diuretic use  · Trending towards baseline at time of discharge  · Baseline appears to be 2 6 - 3 0  · S/P IVF in ED  · Holding Lisinopril and diuretics  · Decrease metoprolol to 25mg daily    * Hypotension  Assessment & Plan  Background: Presented to the outpatient office on 4/18 and recommended to come to the ER for evaluation given ongoing hypotension thought to be secondary to new cardiomyopathy and inability to tolerate higher antihypertensive regimen with new reduced ejection fraction    · Symptoms associated include fatigue and lightheadedness however denied chest pain, palpitations, nausea, or episodes of syncope  · EKG  · Chest x-ray unremarkable  · Cardiology consulted for assistance with medication regimen  · Discontinue lisinopril  · Reduce metoprolol to 25 mg daily  · Reduce Demadex to 10 mg daily  · Monitor with routine vitals    History of ST elevation myocardial infarction (STEMI)  Assessment & Plan  · Discharged on 4/12 for STEMI status post cardiac catheterization found to have mid LAD 99% stenosis status post PCI x2  · See plan as noted above    Ischemic cardiomyopathy  Assessment & Plan  · TTE from 4/10 with LVEF of 30 to 35% and apical hypokinesis  · LifeVest continuous telemetry  · Reduce Demadex to 10mg daily    Coronary artery disease involving native coronary artery of native heart without angina pectoris  Assessment & Plan  · With recent stent placement as documented above  · Continue aspirin, statin, and beta-blocker at reduced dose    Anemia due to stage 4 chronic kidney disease ([facility] [address] )  Assessment & Plan  · Epogen as an outpatient every 14 days, currently on hold by nephro as patient has been stable  · Monitor closely given factor V Leyden deficiency and on Coumadin therapy  · INR therapeutic on admission, trend daily  · Baseline appears to be approximately 10-11  · Stable on admission  · Avoid nephrotoxins, and NSAIDs if possible  · Will have cardiology assist with medical management to avoid hypotension  · Serial lab monitoring    Chronic diastolic (congestive) heart failure (HCC)  Assessment & Plan  Wt Readings from Last 3 Encounters:   04/19/23 111 kg (245 lb 6 4 oz)   04/18/23 112 kg (247 lb)   04/10/23 113 kg (250 lb)     · With ischemic cardiomyopathy and LifeVest placement  · Monitor on tele  · EF of 30% status post STEMI  · Daily weight  · Intake and output  · Monitor volume status with serial physical exam  · Holding diuretics and received fluids in ED for hypotension  · Decrease torsemide to 10mg daily on discharge        Paroxysmal A-fib ([address] )  Assessment & Plan  · Previously on flecainide but discontinued status post STEMI> now continue with Toprol  daily  · Maintained on Coumadin  · Goal INR 2-3        Medical Problems     Resolved Problems  Date Reviewed: 4/19/2023   None       Discharging Physician / Practitioner: Wilma Rodrigues PA-C  PCP: Tiffany Montoya MD  Admission Date:   Admission Orders (From admission, onward)     Ordered        04/18/23 1336  Place in Observation  Once                      Discharge Date: 04/19/23    Consultations During Hospital Stay:  · Cardiology    Procedures Performed:   · KVNG BRADSHAW arterial duplex 4/18:   RIGHT UPPER LIMB:  This resting evaluation shows no evidence of significant upper extremity  arterial occlusive disease  Unable to obtain a Forearm/Upper arm index due to non-compressible vessels  Digit pressure of 97mmHg, which is within limits for healing potential      LEFT UPPER LIMB:  Forearm/Upper arm index:  1 35 , which is in the normal category    Digit pressure of 84 mmHg, which is within limits "for healing potential     Significant Findings / Test Results:   · CXR 4/18: No acute cardiopulmonary disease    Incidental Findings:   · None     Test Results Pending at Discharge (will require follow up): · None      Outpatient Tests Requested:  · Home BP and weight monitoring    Complications:  None    Reason for Admission: hypotension    Hospital Course:   Scarlett Baez is a 68 y o  male patient past medical history of STEMI with recent cardiac catheterization status post PCI, ischemic cardiomyopathy, CKD 4, CHF, LifeVest present who originally presented to the hospital on 4/18/2023 due to willing to fatigue and weakness      Creatinine elevation noted on admission, patient will be at baseline, likely secondary to diuretic use  Patient discharged on 50 mg Toprol-XL, lisinopril and 20 mg Demadex daily, suspect especially in patient's renal function, these doses may have been too high for patient to tolerate  Decreased metoprolol, discontinue lisinopril and decrease Demadex under the guidance of cardiology this admission  Hemodynamically stable at time of discharge and appropriate for outpatient follow-up  Patient advised to follow-up with his outpatient cardiologist, weigh himself daily at home and take his blood pressure, he is advised to call his outpatient provider if these become unstable as he may need further adjustment to his medication regimen  Please see above list of diagnoses and related plan for additional information  Condition at Discharge: stable    Discharge Day Visit / Exam:   Subjective: Patient reports he feels better, wants to leave the hospital and take care of his wife    Vitals: Blood Pressure: 111/60 (04/19/23 0721)  Pulse: 60 (04/19/23 0721)  Temperature: 97 6 °F (36 4 °C) (04/19/23 0721)  Temp Source: Oral (04/19/23 0721)  Respirations: 14 (04/19/23 0721)  Height: 5' 11\" (180 3 cm) (04/18/23 1055)  Weight - Scale: 111 kg (245 lb 6 4 oz) (04/19/23 0530)  SpO2: 95 % (04/19/23 " 6444)  Exam:   Physical Exam  Vitals and nursing note reviewed  Constitutional:       General: He is not in acute distress  Appearance: Normal appearance  He is well-developed  HENT:      Head: Normocephalic and atraumatic  Eyes:      General: No scleral icterus  Conjunctiva/sclera: Conjunctivae normal    Cardiovascular:      Rate and Rhythm: Normal rate and regular rhythm  Heart sounds: Murmur heard  Pulmonary:      Effort: Pulmonary effort is normal       Breath sounds: No wheezing, rhonchi or rales  Abdominal:      General: There is no distension  Palpations: Abdomen is soft  Skin:     General: Skin is warm and dry  Neurological:      General: No focal deficit present  Mental Status: He is alert  Psychiatric:         Mood and Affect: Mood normal         Discussion with Family: Updated  (wife) via phone  Discharge instructions/Information to patient and family:   See after visit summary for information provided to patient and family  Provisions for Follow-Up Care:  See after visit summary for information related to follow-up care and any pertinent home health orders  Disposition:   Home    Planned Readmission: None     Discharge Statement:  I spent 65 minutes discharging the patient  This time was spent on the day of discharge  I had direct contact with the patient on the day of discharge  Greater than 50% of the total time was spent examining patient, answering all patient questions, arranging and discussing plan of care with patient as well as directly providing post-discharge instructions  Additional time then spent on discharge activities  Discharge Medications:  See after visit summary for reconciled discharge medications provided to patient and/or family        **Please Note: This note may have been constructed using a voice recognition system**

## 2023-04-19 NOTE — ASSESSMENT & PLAN NOTE
dexa shows worsening -6% decline and now osteoporosis  Cont calcium 1200-1500mg daily  Vit d 800-1000iu daily  Daily 60+min weight bearing exercise.   Fosamax 70mg qweek, 12tab, 3 refills  Repeat dexa in 2y   · Previously on flecainide but discontinued status post STEMI> now continue with Toprol  daily  · Maintained on Coumadin  · Goal INR 2-3

## 2023-04-19 NOTE — ASSESSMENT & PLAN NOTE
· 3 33 on admission due to hypotension and diuretic use  · Trending towards baseline at time of discharge  · Baseline appears to be 2 6 - 3 0  · S/P IVF in ED  · Holding Lisinopril and diuretics  · Decrease metoprolol to 25mg daily

## 2023-04-19 NOTE — ASSESSMENT & PLAN NOTE
· With recent stent placement as documented above  · Continue aspirin, statin, and beta-blocker at reduced dose

## 2023-04-19 NOTE — ASSESSMENT & PLAN NOTE
· TTE from 4/10 with LVEF of 30 to 35% and apical hypokinesis  · LifeVest continuous telemetry  · Reduce Demadex to 10mg daily

## 2023-04-19 NOTE — ASSESSMENT & PLAN NOTE
· Epogen as an outpatient every 14 days, currently on hold by nephro as patient has been stable  · Monitor closely given factor V Leyden deficiency and on Coumadin therapy  · INR therapeutic on admission, trend daily  · Baseline appears to be approximately 10-11  · Stable on admission  · Avoid nephrotoxins, and NSAIDs if possible  · Will have cardiology assist with medical management to avoid hypotension  · Serial lab monitoring

## 2023-04-25 ENCOUNTER — TRANSCRIBE ORDERS (OUTPATIENT)
Dept: CARDIAC REHAB | Facility: HOSPITAL | Age: 73
End: 2023-04-25

## 2023-04-25 ENCOUNTER — CLINICAL SUPPORT (OUTPATIENT)
Dept: CARDIAC REHAB | Facility: HOSPITAL | Age: 73
End: 2023-04-25

## 2023-04-25 DIAGNOSIS — I25.2 HISTORY OF ST ELEVATION MYOCARDIAL INFARCTION (STEMI): ICD-10-CM

## 2023-04-25 DIAGNOSIS — Z95.5 S/P CORONARY ARTERY STENT PLACEMENT: Primary | ICD-10-CM

## 2023-04-25 DIAGNOSIS — I25.10 CORONARY ARTERY DISEASE INVOLVING NATIVE CORONARY ARTERY OF NATIVE HEART WITHOUT ANGINA PECTORIS: Chronic | ICD-10-CM

## 2023-04-25 RX ORDER — METOPROLOL SUCCINATE 25 MG/1
25 TABLET, EXTENDED RELEASE ORAL 2 TIMES DAILY
Qty: 60 TABLET | Refills: 1 | Status: SHIPPED | OUTPATIENT
Start: 2023-04-25

## 2023-04-25 NOTE — PROGRESS NOTES
"CARDIAC REHAB ASSESSMENT    Today's date: 2023  Patient name: Sary Guajardo     : 1950       MRN: [id number]  PCP: Aquiles Mathew MD  Referring Physician: Laverne Nieves, *  Cardiologist: Dr Emilie Tse   Surgeon: None   Dx:   Encounter Diagnosis   Name Primary?    • S/P coronary artery stent placement Yes       Date of onset: 2023  Cultural needs: None     Weight    Wt Readings from Last 1 Encounters:   23 111 kg (245 lb 6 4 oz)      Height:   Ht Readings from Last 1 Encounters:   23 5' 11\" (1 803 m)     Medical History:   Past Medical History:   Diagnosis Date   • Acute venous embolism and thrombosis of deep vessels of proximal lower extremity (HCC)     Last assessed: 5/18/15   • Arthritis    • Cellulitis     LE   • Chronic kidney disease 3/2020    Acute Kidney Injury   • CPAP (continuous positive airway pressure) dependence    • Factor V Leiden ([facility] [address] )    • Forgetfulness    • Gross hematuria 2022   • Headache(784 0) 3/2022    Never till cervical  spine surgery   • Heart murmur 3/2020    Told when in hospital   • [facility] (hard of hearing)    • Hypoalbuminemia 2022   • Other acute osteomyelitis, other site ([facility] [address] ) 1/3/2023   • Paroxysmal atrial fibrillation (HCC)     Last assessed: 11/2/15   • Sleep apnea          Physical Limitations: weakness in lower body, balance     Fall Risk: Moderate   Comments: Patient uses walking assist device (walker/cane/rollator) and Denies a fall in the past 6 months    Anginal Equivalent: Dyspnea, Nausea, Vomiting, Jaw Pain and chest heaviness and\"burping feeling\"   Was in [place]    NTG use: No prescription    Risk Factors   Cholesterol: Yes  Smoking: Never used  HTN: Yes  DM: No  Obesity: Yes   Inactivity: Yes  Stress:  perceived  stress: 8/10   Stressors: wife's health, his health, recent health changes and concerns    Goals for Stress Management:Practice Relaxation Techniques, Exercise, Keep a positive mindset, Spend time outside, Enjoy a " hobby and Spend time with family    Family History:  Family History   Problem Relation Age of Onset   • Lung cancer Mother    • Hearing loss Father    • Heart attack Brother    • Atrial fibrillation Brother    • Emphysema Sister        Allergies: Morphine and Vitamin k  ETOH:   Social History     Substance and Sexual Activity   Alcohol Use Not Currently   • Alcohol/week: 0 0 standard drinks         Current Medications:   Current Outpatient Medications   Medication Sig Dispense Refill   • acetaminophen (TYLENOL) 500 mg tablet Take 500 mg by mouth if needed for mild pain     • atorvastatin (LIPITOR) 80 mg tablet Take 1 tablet (80 mg total) by mouth every evening 90 tablet 3   • clopidogrel (PLAVIX) 75 mg tablet Take 1 tablet (75 mg total) by mouth daily 90 tablet 3   • DULoxetine (CYMBALTA) 60 mg delayed release capsule Take 1 capsule (60 mg total) by mouth daily 90 capsule 1   • gabapentin (NEURONTIN) 300 mg capsule TAKE 1 CAPSULE BY MOUTH  DAILY AT BEDTIME 90 capsule 1   • metoprolol succinate (TOPROL-XL) 25 mg 24 hr tablet Take 1 tablet (25 mg total) by mouth 2 (two) times a day 60 tablet 1   • mometasone (ELOCON) 0 1 % cream Apply topically daily 45 g 0   • potassium chloride (MICRO-K) 10 MEQ CR capsule Take 1 capsule (10 mEq total) by mouth 2 (two) times a day 30 capsule 0   • prednisoLONE acetate (PRED FORTE) 1 % ophthalmic suspension INSTILL ONE DROP INTO THE RIGHT EYE 4 TIMES A DAY     • tamsulosin (FLOMAX) 0 4 mg TAKE 1 CAPSULE BY MOUTH  DAILY WITH DINNER 90 capsule 1   • torsemide (DEMADEX) 10 mg tablet Take 1 tablet (10 mg total) by mouth daily 30 tablet 0   • warfarin (COUMADIN) 5 mg tablet TAKE 2 5MG (1/2 TAB) BY  MOUTH DAILY EXCEPT  WED   AND SAT TAKE 5MG ( 1 TAB) DAILY 78 tablet 0   • zolpidem (AMBIEN) 10 mg tablet Take 1 tablet (10 mg total) by mouth daily at bedtime as needed for sleep 90 tablet 0     No current facility-administered medications for this visit           Functional Status Prior to Diagnosis for Treatment   Occupation: retired    Recreation: Hunting, fishing   ADL’s: Capable of performing light to moderate ADLs  [place]: able to perform self-care driving   Exercise: Was completing PT for back   Other: STEMI occurred on drive back from [facility]    Current Functional Status  Occupation: retired    Recreation: Loves to hunt and fish - wants to get back to it   ADL’s:Capable of performing light to moderate ADLs limited by Weakness  Orthopedic limitations   [place]: able to perform self-care, driving, Takes care of wife   Exercise: None  Other: Using walker, a lot of stress with health of wife, did not exercise today due to rapid AFIB      Patient Specific Goals:  Walk better with better balance, to get back hunting and fishing, and help wife feel better/take care of her      Short Term Program Goals: dietary modifications increased strength improved energy/stamina with ADLs exercise 120-150 mins/wk wt loss 1-2 ppw    Long Term Goals: increased maximal walking duration  increased intial training workload  Improved [facility] Activity Status score  Improved functional capacity  Improved Quality of Life - [facility] score reduced  Improved lipid profile  Reduced body fat%  Improved fasting glucose  Reduced stress  improved Rate Your Plate Score    Ability to reach goals/rehabilitation potential:  Very Good     Projected return to function: 8-12 weeks  Objective tests: Did not complete initial submax TRM ETT due to rapid afib per Dr Arslan Trivedi, medication changes made  Will complete on 1st day of exercise on 5/2      Nutritional   Reviewed details of Rate your Plate  Discussed key elements of heart healthy eating  Reviewed patient goals for dietary modifications and their clinical implications  Reviewed most recent lipid profile       Goals for dietary modification based on Rate Your Plate Dietary Assessment:  more meatless meals  low fat dairy   increase whole grains  more nuts/seeds  Patient already making changes to diet following STEMI, son helps with cooking meals for him      Emotional/Social  Patient reports feelings of depression   Patient reports feelings of anxiety  Reports sufficient emotional support  Patient sees psychologist 1x/week   Wife is not doing well/struggling to take of her and himself    Hs a good attitude about things though     Marital status:     Domestic Violence Screening: No    Comments: STEMI on 4/9/2023 - [place]   developed chest pain that he describes as severe, central, radiating to the back and both shoulder blades and both sides of the jaw, constant, associated with mild shortness of breath and then nausea, and episode of vomiting  History of paroxysmal atrial fibrillation on flecainide, toprol xl, and warfarin, atrial flutter s/p ablation, RBBB, diastolic heart failure, worsening CKD, factor V leiden deficiency with DVT, HTN, HLD  Cardiac cath - prox to mid LAD 99% PCI   Echo: 30-35%, LifeVest   Recent OV - hypotension sent to ED     Patient put on monitor today for evaluation- rapid Afib noted and Dr Thomas  notified - no exercise and med adjustments made   Reports depression and anxiety - health of him and wife but reports good support system

## 2023-04-25 NOTE — PROGRESS NOTES
Spoke with pt  Relayed msg as given  Pt verbalized understanding  Tobias Mcguire MD at 4/25/2023  2:52 PM    Status: Signed   Paged from rehab patient having resting HR AF with RVR @ 130 bpm  /62  Will increase metoprolol XL from 25 mg daily to 25 mg BID and skip rehab today        Please call patient to confirm rx to [facility]

## 2023-04-25 NOTE — PROGRESS NOTES
Paged from rehab patient having resting HR AF with RVR @ 130 bpm  /62  Will increase metoprolol XL from 25 mg daily to 25 mg BID and skip rehab today       Please call patient to confirm rx to [facility]

## 2023-04-25 NOTE — PROGRESS NOTES
Cardiac Rehabilitation Plan of Care   Initial Care Plan        Today's date: 2023   # of Exercise Sessions Completed: 1 - evaluation   Patient name: Kimberly Hardin      : 1950  Age: 68 y o  MRN: [id number]  Referring Physician: Carole Esteves, *  Cardiologist: Dr Nevin Rubio   Provider: Clyde Patel  Clinician: Lorraine Kelly, MS, CEP     Dx:   Encounter Diagnosis   Name Primary? • S/P coronary artery stent placement Yes     Date of onset: 2023      SUMMARY OF PROGRESS:  Today is Jay Jay Garcia's initial evaluation to begin Cardiac Rehab post PCI to mid to prox LAD  He was sent to the ED for having chest pain on 2023 coming home from [facility]  He was then noted to have an EF of 30-35% and wearing LifeVest  He recently had an OV which sent him to the ED for hypotension  He reports he has been feeling tired but feeling better today  The patient does not currently follow a formal exercise program at home  He wants to get back to doing PT for his back on his opposite days of rehab as well  He has resumed light to moderate ADLs reporting weakness and fatigue  Depression screening using the PHQ-9 interprets the patient's score of  12  as  10-14 = Moderate Depression  SERGE-7 screening tool for anxiety suggests 8  5-9 = Mild anxiety  When addressed, the patient admits to having depression/anxiety  He has been seeing a psychologist 1x/week  Patient reports excellent social/emotional support from family  Information to utilize [employer] was provided as well as contact information for counseling through [employer]  PHQ-9 score will be reassessed in 30 days due to an initial score revealing concern for depression  They rate stress 8/10 with the following stressors: wife currently in hospital/declining health and his recent health concerns  This is contributing to his depression but he reports he tries to maintain a positive outlook each day  Stress management will be reviewed    The patient is a non-smoker  Patient admits to 100% medication compliance  They report the following physical limitations: lower body weakness/balance - using walker when outside of house  He did not complete his initial assessment submax TM ETT due to rapid afib noted on the monitor (120-130s)  Dr Pierce Guardidesean notified via [employer] and deferred exercise and medication change of Metoprolol 25mg 2x/day  Will complete assessment on first day of exercise 5/2  Resting  /62  Blood Pressure will be monitored throughout the program and cardiologist will be notified of elevated trends  Telemetry revealed Rapid afib  Cristine Rangel was counseled on exercise guidelines to achieve a minimum of 150 mins/wk of moderate intensity (RPE 4-6) exercise and encouraged to add 1-2 days of exercise on opposite days of cardiac rehab as tolerated  We discussed current dietary habits and goals of heart healthy eating for lipid management and weight loss  The patient has does not have diabetes  Patient's personal goals include: increase lower body strength with stamina with walking/balance, get back to hunting and fishing, and be able to help his wife get better/care taker  The patient's CAD risk factors include:  inactivity, stress, obesity/overweight, hypertension and hyperlipidemia  His education will focus on lifestyle modification/education specific to His needs  Patient will attend group education classes on heart healthy eating, reading food labels, stress management, risk factor reduction, understanding heart disease and common heart medications  Patient will attend 35 monitored exercise sessions, 2x/wk for 12-18 weeks beginning 05/02/2023         Medication compliance: Yes   Comments: Pt reports to be compliant with medications  Fall Risk: Moderate   Comments: Patient uses walking assist device (walker/cane/rollator) and Denies a fall in the past 6 months    EKG Interpretation: Rapid Afib       EXERCISE ASSESSMENT and PLAN    Exercise Prescription:      Frequency: 2 days/week   Supplement with home exercise 2+ days/wk as tolerated       Minutes: 20-25         METS: Will calculate on 1st day of exercise, defer submax ETT             HR: RHR+ 30 beats above, monitor HR closely    RPE: 4-6         Modalities: Treadmill, UBE and NuStep      30 Day Goals for Exercise Progression:    Frequency: 2 days/week of cardiac rehab       Supplement with home exercise 2+ days/wk as tolerated    Minutes: 30-35                              >150 mins/wk of moderate intensity exercise   METS: Will calculate following 1st day of execise    HR: RHR+30 beats, monitor HR closely     RPE: 3-4   Modalities: Treadmill, UBE, Lifecycle, NuStep and Recumbent bike    Strength trainin-3 days / week  12-15 repetitions  1-2 sets per modality   Will be added following 2-3 weeks of monitored exercise sessions   Modalities: Pull Downs, Lateral Raise, Arm Extension, Arm Curl, Sit to [employer], Upright Rows, Front Raises and Shoulder Shrugs    Home Exercise: none    Goals: 10% improvement in functional capacity - based on max METs achieved in fitness assessment, improved DASI score by 10%, Increase in exercise capacity by 40% - based on peak METs tolerated in cardiac rehab exercise session, Exercise 5 days/wk, >150mins/wk of moderate intensity exercise, Resume ADLs with increased strength, Attend Rehab regularly, Decrease sitting time and start a home exercise program    Progression Toward Goals:  Reviewed Pt goals and determined plan of care, Patient will start to increase lower body stamina and strength with different modalities  in the next 30 days, Will continue to educate and progress as tolerated      Education: benefit of exercise for CAD risk factors, home exercise guidelines, AHA guidelines to achieve >150 mins/wk of moderate exercise, RPE scale, class: Risk Factors for Heart Disease and physical activity/exercise in extreme weather conditions Plan:education on home exercise guidelines, home exercise 30+ mins 2 days opposite CR and Education class: Risk Factors for Heart Disease  Readiness to change: Preparation:  (Getting ready to change)       NUTRITION ASSESSMENT AND PLAN    Weight control:    Starting weight: 246 6 lbs    Current weight:       Diabetes: N/A  A1c: 6 2    last measured: 4/9/2023    Lipid management: Discussed diet and lipid management and Last lipid profile 4/9/2023  Chol 229    HDL 45      Goals:reduced BMI to < 25, LDL <100, TRG <150, CHOL <200, decreased body fat% <25%   (M), fasting BG , Improved Rate Your Plate score  >44, 1 8-3%  wt loss, increase intake of fish, shellfish, increase intake of meatless meals, use low fat dairy, eat 3 or more servings of whole grains a day, choose low sodium processed foods, eliminate butter, Increase intake of nuts and seeds, daily saturated fat intake <7%/13g and Pt has made many changes since his event     Measurable goals were based Rate Your Plate Dietary Self-Assessment  These are the areas in which the patient could score higher on the assessment  Goals include recommendations for a heart healthy diet based on American Heart Association  Progression Toward Goals: Reviewed Pt goals and determined plan of care, Patient will monitor daily weights and continue making heart healthy choices as well watching diet due to CKD3 in the next 30 days, Will continue to educate and progress as tolerated      Education: heart healthy eating  low sodium diet  hydration  nutrition for  lipid management  healthy choices while dining out  portion control  education class: Heart Healthy Eating  education class:  Label Reading  Plan: Education class: Reading Food Labels, Education Class: Heart Healthy Eating, replace refined grain bread with whole grain bread, replace unhealthy snacks with fruits & vegs, switch to skim or 1% milk, avoid processed foods, remove salt shaker from table, use salt substitute like Mrs  Dash, increase utilization of fresh or dried herbs, will try new grains like brown rice, quinoa, farro, drink more water and learn how to read food labels  Readiness to change: Action:  (Changing behavior)      PSYCHOSOCIAL ASSESSMENT AND PLAN    Emotional:  Depression assessment:  PHQ-9 = 12  10-14 = Moderate Depression Medically managing with seeing a psychologist weekly             Anxiety measure:  SERGE-7 = 8  5-9 = Mild anxiety  Self-reported stress level:  8  Social support: Very Good    Goals:  Reduce perceived stress to 1-3/10, improved [facility] QOL < 27, PHQ-9 - reduced severity by one level, continue medical therapy, Feelings in [facility] Score < 3, Physical Fitness in [facility] Score < 3, Overall Health in [facility] Score < 3, Quality of Life in [facility] Score < 3 , Change in Health in [place] Score < 3 , Increased interest in doing things, improved sleep, improved concentration, improved positive thoughts of well being, increased energy, stop worrying, take time to relax and Feel less anxious    Progression Toward Goals: Reviewed Pt goals and determined plan of care, Patient will continue medical therapy and repeat PHQ-9 and SERGE-7 in the next 30 days, Will continue to educate and progress as tolerated      Education: signs/sxs of depression, benefits of a positive support system, stress management techniques, depression and CAD, benefits of mental health counseling and class:  Stress and Your Health   Plan: Class: Stress and Your Health, PHQ-9 >5 will refer to MD, Practice relaxation techniques, Exercise, Spend time outdoors, Enjoy a hobby, Keep a positive mindset, Reduce dependence  on family, Enjoy family and Repeat PHQ-9 every 30 days if score >5  Readiness to change: Preparation:  (Getting ready to change)       OTHER CORE COMPONENTS     Tobacco:   Social History     Tobacco Use   Smoking Status Never   Smokeless Tobacco Never       Tobacco Use Intervention:   N/A:  Patient is a non-smoker     Anginal Symptoms:  SOB, nausea/vomiting and chest heaviness   NTG use: No prescription    Blood pressure:    Restin/62   Exercise: None  Goals: consistent BP < 130/80, reduced dietary sodium <2300mg, moderate intensity exercise >150 mins/wk and medication compliance    Progression Toward Goals: Reviewed Pt goals and determined plan of care, Patient will montior BP at home closely with recent medication adjustments in the next 30 days, Will continue to educate and progress as tolerated      Education:  understanding high blood pressure and it's relationship to CAD, low sodium diet and HTN, Education class:  Common Heart Medications and Education class: Understanding Heart Disease  Plan: Class: Understanding Heart Disease, Class: Common Heart Medications, avoid places with second hand smoke, Avoid Processed foods, engage in regular exercise, eliminate salt shaker at the table, use salt substitutes, check labels for sodium content and monitor home BP  Readiness to change: Preparation:  (Getting ready to change)

## 2023-04-25 NOTE — PROGRESS NOTES
Grzegorz Del Angel        Dear Dr Jo-Ann Garay,    Emotional well-being and depression is addressed in the cardiac  rehab evaluation  To assess the severity of depression, patients are given the PHQ-9 Depression Questionnaire  This is to inform you that your patient Geneva Pulido scored a 12 which is interpreted as 10-14 = Moderate Depression  He reports he sees a psychologist weekly and tries to keep a positive mindset even with everything that is going on with him and his wife  Just FYI  Your patient was provided contact information for [facility] and has been encouraged to enroll in [employer]  A repeat PHQ -9 will be administered in 30 days to assess improvement  Thank you for your support of cardiopulmonary rehab      Sincerely,      Dimitri Sarkar MS, CEP

## 2023-04-26 ENCOUNTER — OFFICE VISIT (OUTPATIENT)
Dept: FAMILY MEDICINE CLINIC | Facility: HOSPITAL | Age: 73
End: 2023-04-26

## 2023-04-26 VITALS
BODY MASS INDEX: 33.6 KG/M2 | WEIGHT: 240 LBS | HEART RATE: 69 BPM | OXYGEN SATURATION: 99 % | HEIGHT: 71 IN | SYSTOLIC BLOOD PRESSURE: 130 MMHG | DIASTOLIC BLOOD PRESSURE: 70 MMHG

## 2023-04-26 DIAGNOSIS — R73.01 IFG (IMPAIRED FASTING GLUCOSE): Primary | ICD-10-CM

## 2023-04-26 DIAGNOSIS — D63.1 ANEMIA DUE TO STAGE 4 CHRONIC KIDNEY DISEASE (HCC): ICD-10-CM

## 2023-04-26 DIAGNOSIS — N18.4 ANEMIA DUE TO STAGE 4 CHRONIC KIDNEY DISEASE (HCC): ICD-10-CM

## 2023-04-26 DIAGNOSIS — D68.51 FACTOR V LEIDEN MUTATION (HCC): ICD-10-CM

## 2023-04-26 DIAGNOSIS — R26.2 AMBULATORY DYSFUNCTION: ICD-10-CM

## 2023-04-26 DIAGNOSIS — N02.8 IGA NEPHROPATHY DETERMINED BY BIOPSY OF KIDNEY: ICD-10-CM

## 2023-04-26 NOTE — PROGRESS NOTES
Assessment/Plan:    IFG (impaired fasting glucose)  hba1c  Will be ordered   had some mild elevation during recent stay   will repeat labs     Ambulatory dysfunction  Using wheeled walker    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  To see Dr Radha Browne mutation [facility])  On chronic warfarin       Diagnoses and all orders for this visit:    IFG (impaired fasting glucose)    Anemia due to stage 4 chronic kidney disease ([facility] [address] )    Ambulatory dysfunction    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    Factor V Leiden mutation ([facility] [address] )          Subjective:      Patient ID: Jenni Hanna is a 68 y o  male  1  Transition of care -   had chest pain on Easter and then went to ED and  Found to have st eelvation mi- transferred to [facility]  With cardiac cath = Dr Melinda Horton - had cath done and had 2 stents in LAD  Also has a  Right cad lesion about 80%   to start cardiac rehab next week  following with Dr plemenac    echo with decreased ej fraction  30- 35% on 4/10 - has lifevest      2   had been in hospital  last march 2023-  Had infection on neck with mRSA and has used a walker since then- had seen  Dr Zhao Banuelos and had surgery twice  was doing rehab for his spinal cord issues in past      3  leiden v factor-- is on longstanding warfarin- discussed possible home monitoring if he wishes in the future  4   A ki / ckd- has appt w to see Dr Ángela Vicente- reports crt had been over 5 a fter  Mrsa infection last year - now had crt 3 15 on last lebs  5  Obesity( wt 240 - has lost about  10 lbs since in hospital  For MI- eating healthy diet       Patient is seen for diabetic followup exam today  Recent hba1c was done   No complaints of episodes of hypoglycemia, excess thirst or increased urinary frequency    Home readings were reviewed with fasting glucose readings generally   The following portions of the patient's history were reviewed and updated as appropriate: allergies, current medications, past family history, past medical history, past social history, past surgical history and problem list     Review of Systems   Constitutional: Negative for fatigue  HENT: Negative for postnasal drip  Respiratory: Negative for shortness of breath  Cardiovascular: Positive for palpitations  Negative for chest pain  Had paf during recent admit    Gastrointestinal: Negative for constipation and nausea  Genitourinary: Negative for difficulty urinating  Musculoskeletal: Negative for gait problem  Psychiatric/Behavioral: Negative for agitation and dysphoric mood  The patient is not hyperactive  All other systems reviewed and are negative        Current Outpatient Medications on File Prior to Visit   Medication Sig Dispense Refill   • acetaminophen (TYLENOL) 500 mg tablet Take 500 mg by mouth if needed for mild pain     • atorvastatin (LIPITOR) 80 mg tablet Take 1 tablet (80 mg total) by mouth every evening 90 tablet 3   • clopidogrel (PLAVIX) 75 mg tablet Take 1 tablet (75 mg total) by mouth daily 90 tablet 3   • DULoxetine (CYMBALTA) 60 mg delayed release capsule Take 1 capsule (60 mg total) by mouth daily 90 capsule 1   • gabapentin (NEURONTIN) 300 mg capsule TAKE 1 CAPSULE BY MOUTH  DAILY AT BEDTIME 90 capsule 1   • metoprolol succinate (TOPROL-XL) 25 mg 24 hr tablet Take 1 tablet (25 mg total) by mouth 2 (two) times a day 60 tablet 1   • mometasone (ELOCON) 0 1 % cream Apply topically daily 45 g 0   • potassium chloride (MICRO-K) 10 MEQ CR capsule Take 1 capsule (10 mEq total) by mouth 2 (two) times a day 30 capsule 0   • prednisoLONE acetate (PRED FORTE) 1 % ophthalmic suspension INSTILL ONE DROP INTO THE RIGHT EYE 4 TIMES A DAY     • tamsulosin (FLOMAX) 0 4 mg TAKE 1 CAPSULE BY MOUTH  DAILY WITH DINNER 90 capsule 1   • torsemide (DEMADEX) 10 mg tablet Take 1 tablet (10 mg total) by mouth daily 30 tablet 0   • warfarin (COUMADIN) 5 mg tablet TAKE 2 5MG (1/2 TAB) BY  MOUTH DAILY EXCEPT  WED   AND SAT TAKE 5MG ( 1 TAB) DAILY 78 tablet 0   • zolpidem (AMBIEN) 10 mg tablet Take 1 tablet (10 mg total) by mouth daily at bedtime as needed for sleep 90 tablet 0     No current facility-administered medications on file prior to visit  Objective:    Physical Exam  Vitals and nursing note reviewed  HENT:      Head: Normocephalic  Right Ear: Tympanic membrane normal       Left Ear: Tympanic membrane normal       Nose: No congestion or rhinorrhea  Mouth/Throat:      Pharynx: No oropharyngeal exudate or posterior oropharyngeal erythema  Eyes:      General:         Right eye: No discharge  Left eye: No discharge  Conjunctiva/sclera: Conjunctivae normal       Pupils: Pupils are equal, round, and reactive to light  Cardiovascular:      Rate and Rhythm: Normal rate  Rhythm irregular  Heart sounds: No murmur heard  Pulmonary:      Breath sounds: No wheezing or rhonchi  Abdominal:      General: There is no distension  Palpations: Abdomen is soft  Tenderness: There is no guarding  Comments: pverweight   Musculoskeletal:         General: Tenderness present  Cervical back: No rigidity  Comments: Has chronic knee pain- was to have surgery  3 years ago but on hold due to neck surery   medial tenderness   Neurological:      Mental Status: He is alert  Cranial Nerves: No cranial nerve deficit  Motor: No weakness  Coordination: Coordination normal    Psychiatric:         Mood and Affect: Mood normal          Thought Content:  Thought content normal          Judgment: Judgment normal        TCM Call     Date and time call was made  4/19/2023  2:57 PM    Hospital care reviewed  Records reviewed    Patient was hospitialized at  [address] [place] [address]    Date of Admission  04/18/23    Date of discharge  04/19/23    Diagnosis  Hypotension    Disposition  Home    Were the patients medications reviewed and updated  Yes    Current Symptoms  None      TCM Call     Post hospital issues  None    Should patient be enrolled in anticoag monitoring? Yes  already enrolled    Scheduled for follow up? Yes    Patients specialists  Cardiologist    Did you obtain your prescribed medications  Yes    Do you need help managing your prescriptions or medications  No    Is transportation to your appointment needed  No    I have advised the patient to call PCP with any new or worsening symptoms  Yenny Pike MA      Living Arrangements  Spouse or Significiant other    Support System  Spouse    The type of support provided  Emotional; Financial; Physical    Do you have social support  Yes, as much as I need    Are you recieving any outpatient services  No    Are you recieving home care services  No    Types of home care services  Nurse visit    Are you using any community resources  No    Have you fallen in the last 12 months  No    Interperter language line needed  No

## 2023-04-27 ENCOUNTER — OFFICE VISIT (OUTPATIENT)
Dept: NEPHROLOGY | Facility: HOSPITAL | Age: 73
End: 2023-04-27

## 2023-04-27 VITALS
DIASTOLIC BLOOD PRESSURE: 70 MMHG | WEIGHT: 245 LBS | HEIGHT: 71 IN | BODY MASS INDEX: 34.3 KG/M2 | SYSTOLIC BLOOD PRESSURE: 128 MMHG | HEART RATE: 70 BPM

## 2023-04-27 DIAGNOSIS — D63.1 ANEMIA DUE TO STAGE 4 CHRONIC KIDNEY DISEASE (HCC): ICD-10-CM

## 2023-04-27 DIAGNOSIS — N18.4 ANEMIA DUE TO STAGE 4 CHRONIC KIDNEY DISEASE (HCC): ICD-10-CM

## 2023-04-27 DIAGNOSIS — R60.0 LOWER EXTREMITY EDEMA: ICD-10-CM

## 2023-04-27 DIAGNOSIS — I50.32 CHRONIC DIASTOLIC (CONGESTIVE) HEART FAILURE (HCC): ICD-10-CM

## 2023-04-27 DIAGNOSIS — N17.9 AKI (ACUTE KIDNEY INJURY) (HCC): Primary | ICD-10-CM

## 2023-04-27 DIAGNOSIS — N02.8 IGA NEPHROPATHY DETERMINED BY BIOPSY OF KIDNEY: ICD-10-CM

## 2023-04-27 DIAGNOSIS — R80.8 OTHER PROTEINURIA: ICD-10-CM

## 2023-04-27 DIAGNOSIS — R31.29 MICROSCOPIC HEMATURIA: ICD-10-CM

## 2023-04-27 DIAGNOSIS — N05.9 GLOMERULONEPHRITIS: ICD-10-CM

## 2023-04-27 NOTE — PROGRESS NOTES
NEPHROLOGY OUTPATIENT PROGRESS NOTE   Jourdan Clements 68 y o  male MRN: [id number]  DATE: 4/27/2023  Reason for visit:   Chief Complaint   Patient presents with   • Acute Renal Failure   • Follow-up        Patient Instructions   1  JANEL d/t IgAN, biopsy proven, in setting of recent MRSA infection  -renal biopsy performed May 26, 2022 was limited as only 6 intact glomeruli were present for evaluation on light microscopy  IgA dominant immune complex deposition noted by immunofluorescence  Differential includes IgA nephropathy both primary and secondary forms versus infectious associated glomerulonephritis  Staph aureus infections have been associated with IgA dominant immune complex deposition  Patient did have recent MRSA surgical site infection so infection associated GN should be considered   -prednisone 60 mg daily begun June 2nd, 2022  S/p prednisone taper, completed 9/1/22  -monitor CMP, CBC next week  -if this was a primary IgA nephropathy, it would be compatible with [place] classification of M0 E1 S0 T1-C1   -of 38 glomeruli, 6 were intact, forehead global glomerulosclerosis, segmental sclerosis was absent  Moderate interstitial fibrosis and tubular atrophy as well as arterial intimal fibrosis and mild arterolar hyalinosis were noted  -did not require dialysis inpatient, permacath removed prior to discharge  -sCr 2 85 as of 1/20/23, slowly improving from 5 34 and seems to have stabilized  -BUN improved to 39  -as the patient has had an acute onset of rapidly progressive glomerulonephritis with normal complement levels and deposition of mesangial IgA, 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  -DEXA scan shows normal bone density  -Off prednisone since 9/1/22  -off bactrim  -may need to consider rituxan infusion in light of podocytopathy if renal function/proteinuria does not continue to improve  Thankfully, sCr stable and proteinuria continues to improve     2  Hyponatremia - sNa 134, monitor BMP     3  Hyperphosphatemia - phos 4 3, in setting of JANEL  Repeat phos  Likely d/t decreased excretion in setting of JANEL  4  Proteinuria - UpCr in setting of IgAN shows UpCr improved from 18 8g to 1 5g as of 8/15/22, with latest UpCr 202 mg/g as of 4/14/23(improved)  -consider ACEi initiation if UpCr rises above 1g      5  HTN - BP acceptable for age/CKD in office  Continue metoprolol 25mg twice daily, flomax, torsemide 10mg daily  -no longer on Cardura 1 mg at bedtime, amlodipine 5 mg daily,      6  Anemia ? D/t underlying GN/CKD - Hgb 9, received blood transfusion in the past, monitor CBC, Hgb has dropped s/p MI  Will repeat CBC and determine need for ongoing epogen infusions  7  LE edema likely d/t nephrotic syndrome as well as history of chromic diastolic CHF (grade 2 diastolic dysfunction noted on April 2022 echo)  - continue torsemide 10mg daily, will monitor K/SCr every 2 weeks  Monitor daily weight/BP readings  Repeat CMP, CBC next week  Continue torsemide 10mg daily for edema/nephrotic syndrome  RTC in 3 months  Naty San was seen today for acute renal failure and follow-up  Diagnoses and all orders for this visit:    JANEL (acute kidney injury) ([facility] [address] )  -     Comprehensive metabolic panel; Future  -     CBC and differential; Future  -     Phosphorus; Future  -     Comprehensive metabolic panel  -     CBC and differential  -     Phosphorus    Chronic diastolic (congestive) heart failure (HCC)    Glomerulonephritis    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    Microscopic hematuria    Anemia due to stage 4 chronic kidney disease (HCC)    Lower extremity edema    Other proteinuria        Assessment/Plan:  1   JANEL d/t IgAN, biopsy proven, in setting of recent MRSA infection + recent MI  -renal biopsy performed May 26, 2022 was limited as only 6 intact glomeruli were present for evaluation on light microscopy   IgA dominant immune complex deposition noted by immunofluorescence   Differential includes IgA nephropathy both primary and secondary forms versus infectious associated glomerulonephritis   Staph aureus infections have been associated with IgA dominant immune complex deposition   Patient did have recent MRSA surgical site infection so infection associated GN should be considered   -prednisone 60 mg daily begun June 2nd, 2022  S/p prednisone taper, completed 9/1/22  -monitor CMP, CBC next week  -if this was a primary IgA nephropathy, it would be compatible with [place] classification of M0 E1 S0 T1-C1   -of 38 glomeruli, 6 were intact, forehead global glomerulosclerosis, segmental sclerosis was absent   Moderate interstitial fibrosis and tubular atrophy as well as arterial intimal fibrosis and mild arterolar hyalinosis were noted  -did not require dialysis inpatient, permacath removed prior to discharge  -sCr 2 85 as of 1/20/23, slowly improving from 5 34 and seems to have stabilized    -BUN improved to 39  -as the patient has had an acute onset of rapidly progressive glomerulonephritis with normal complement levels and deposition of mesangial IgA, 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  -DEXA scan shows normal bone density  -Off prednisone since 9/1/22  -off bactrim  -may need to consider rituxan infusion in light of podocytopathy if renal function/proteinuria does not continue to improve  Thankfully, sCr stable and proteinuria continues to improve     2  Hyponatremia - sNa 134, monitor BMP     3  Hyperphosphatemia - phos 4 3, in setting of JANEL  Repeat phos   Likely d/t decreased excretion in setting of JANEL        4  Proteinuria - UpCr in setting of IgAN shows UpCr improved from 18 8g to 1 5g as of 8/15/22, with latest UpCr 202 mg/g as of 4/14/23(improved)  -consider ACEi initiation if UpCr rises above 1g      5  HTN - BP acceptable for age/CKD in office   Continue metoprolol 25mg twice daily, flomax, torsemide 10mg "daily  -no longer on Cardura 1 mg at bedtime, amlodipine 5 mg daily,      6  Anemia ? D/t underlying GN/CKD - Hgb 9, received blood transfusion in the past, monitor CBC, Hgb has dropped s/p MI  Will repeat CBC and determine need for ongoing epogen infusions      7  LE edema likely d/t nephrotic syndrome as well as history of chromic diastolic CHF (grade 2 diastolic dysfunction noted on April 2022 echo)  - continue torsemide 10mg daily, will monitor K/SCr every 2 weeks  Monitor daily weight/BP readings      Repeat CMP, CBC  Continue torsemide 10mg daily for edema/nephrotic syndrome  RTC in 3 months  SUBJECTIVE / INTERVAL HISTORY:  68 y o  male presents in follow up of JANEL  Arlen Batres admitted for JANEL d/t hypotension and diuretic use  Was diagnosed with STEMI prior to this in [facility]  No longer on amlodipine or flecainide  Metoprolol dose decreased  Denies NSAID use  HTN - BP at home varies [id number] systolic  Has been going to cardiac rehab upstairs  Review of Systems   Constitutional: Negative for chills and fever  HENT: Negative for sore throat  Eyes: Negative for visual disturbance  Respiratory: Negative for cough and shortness of breath  Cardiovascular: Positive for leg swelling  Negative for chest pain  Gastrointestinal: Negative for abdominal pain, constipation, diarrhea, nausea and vomiting  Endocrine: Negative for polyuria  Genitourinary: Negative for decreased urine volume, difficulty urinating, dysuria and hematuria  Musculoskeletal: Negative for back pain and myalgias  Skin: Negative for rash  Neurological: Negative for dizziness, light-headedness and numbness  Psychiatric/Behavioral: Negative for confusion  OBJECTIVE:  /70   Pulse 70   Ht 5' 11\" (1 803 m)   Wt 111 kg (245 lb)   BMI 34 17 kg/m²  Body mass index is 34 17 kg/m²  Physical exam:  Physical Exam  Vitals reviewed  Constitutional:       General: He is not in acute distress       " Appearance: Normal appearance  He is well-developed  He is not diaphoretic  HENT:      Head: Normocephalic and atraumatic  Nose: Nose normal       Mouth/Throat:      Mouth: Mucous membranes are moist       Pharynx: No oropharyngeal exudate  Eyes:      General: No scleral icterus  Right eye: No discharge  Left eye: No discharge  Neck:      Thyroid: No thyromegaly  Cardiovascular:      Rate and Rhythm: Normal rate and regular rhythm  Heart sounds: Normal heart sounds  Pulmonary:      Effort: Pulmonary effort is normal       Breath sounds: Normal breath sounds  No wheezing or rales  Abdominal:      General: Bowel sounds are normal  There is no distension  Palpations: Abdomen is soft  Tenderness: There is no abdominal tenderness  Musculoskeletal:         General: Swelling (trace b/l LE) present  Normal range of motion  Cervical back: Neck supple  Lymphadenopathy:      Cervical: No cervical adenopathy  Skin:     General: Skin is warm and dry  Findings: No rash  Neurological:      General: No focal deficit present  Mental Status: He is alert        Comments: awake   Psychiatric:         Mood and Affect: Mood normal          Behavior: Behavior normal          Medications:    Current Outpatient Medications:   •  acetaminophen (TYLENOL) 500 mg tablet, Take 500 mg by mouth if needed for mild pain, Disp: , Rfl:   •  atorvastatin (LIPITOR) 80 mg tablet, Take 1 tablet (80 mg total) by mouth every evening, Disp: 90 tablet, Rfl: 3  •  clopidogrel (PLAVIX) 75 mg tablet, Take 1 tablet (75 mg total) by mouth daily, Disp: 90 tablet, Rfl: 3  •  DULoxetine (CYMBALTA) 60 mg delayed release capsule, Take 1 capsule (60 mg total) by mouth daily, Disp: 90 capsule, Rfl: 1  •  gabapentin (NEURONTIN) 300 mg capsule, TAKE 1 CAPSULE BY MOUTH  DAILY AT BEDTIME, Disp: 90 capsule, Rfl: 1  •  metoprolol succinate (TOPROL-XL) 25 mg 24 hr tablet, Take 1 tablet (25 mg total) by mouth 2 "(two) times a day, Disp: 60 tablet, Rfl: 1  •  mometasone (ELOCON) 0 1 % cream, Apply topically daily, Disp: 45 g, Rfl: 0  •  potassium chloride (MICRO-K) 10 MEQ CR capsule, Take 1 capsule (10 mEq total) by mouth 2 (two) times a day, Disp: 30 capsule, Rfl: 0  •  prednisoLONE acetate (PRED FORTE) 1 % ophthalmic suspension, INSTILL ONE DROP INTO THE RIGHT EYE 4 TIMES A DAY, Disp: , Rfl:   •  tamsulosin (FLOMAX) 0 4 mg, TAKE 1 CAPSULE BY MOUTH  DAILY WITH DINNER, Disp: 90 capsule, Rfl: 1  •  torsemide (DEMADEX) 10 mg tablet, Take 1 tablet (10 mg total) by mouth daily, Disp: 30 tablet, Rfl: 0  •  warfarin (COUMADIN) 5 mg tablet, TAKE 2 5MG (1/2 TAB) BY  MOUTH DAILY EXCEPT  WED   AND SAT TAKE 5MG ( 1 TAB) DAILY, Disp: 78 tablet, Rfl: 0  •  zolpidem (AMBIEN) 10 mg tablet, Take 1 tablet (10 mg total) by mouth daily at bedtime as needed for sleep, Disp: 90 tablet, Rfl: 0    Allergies: Allergies as of 04/27/2023 - Reviewed 04/27/2023   Allergen Reaction Noted   • Morphine GI Intolerance 09/11/2018   • Vitamin k Other (See Comments) 12/28/2020       The following portions of the patient's history were reviewed and updated as appropriate: past family history, past surgical history and problem list     Laboratory Results:  Lab Results   Component Value Date    SODIUM 134 (L) 04/19/2023    K 4 2 04/19/2023     04/19/2023    CO2 23 04/19/2023    BUN 57 (H) 04/19/2023    CREATININE 3 15 (H) 04/19/2023    GLUC 100 04/19/2023    CALCIUM 9 2 04/19/2023        Lab Results   Component Value Date    CALCIUM 9 2 04/19/2023    PHOS 4 3 (H) 04/18/2023       Portions of the record may have been created with voice recognition software   Occasional wrong word or \"sound a like\" substitutions may have occurred due to the inherent limitations of voice recognition software   Read the chart carefully and recognize, using context, where substitutions have occurred    "

## 2023-04-27 NOTE — PATIENT INSTRUCTIONS
1  JANEL d/t IgAN, biopsy proven, in setting of recent MRSA infection + recent MI/hypotension  -renal biopsy performed May 26, 2022 was limited as only 6 intact glomeruli were present for evaluation on light microscopy  IgA dominant immune complex deposition noted by immunofluorescence  Differential includes IgA nephropathy both primary and secondary forms versus infectious associated glomerulonephritis  Staph aureus infections have been associated with IgA dominant immune complex deposition  Patient did have recent MRSA surgical site infection so infection associated GN should be considered   -prednisone 60 mg daily begun June 2nd, 2022  S/p prednisone taper, completed 9/1/22  -monitor CMP, CBC next week  -if this was a primary IgA nephropathy, it would be compatible with [place] classification of M0 E1 S0 T1-C1   -of 38 glomeruli, 6 were intact, forehead global glomerulosclerosis, segmental sclerosis was absent  Moderate interstitial fibrosis and tubular atrophy as well as arterial intimal fibrosis and mild arterolar hyalinosis were noted  -did not require dialysis inpatient, permacath removed prior to discharge  -sCr 2 85 as of 1/20/23, slowly improving from 5 34 and seems to have stabilized  -BUN improved to 39  -as the patient has had an acute onset of rapidly progressive glomerulonephritis with normal complement levels and deposition of mesangial IgA, 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  -DEXA scan shows normal bone density  -Off prednisone since 9/1/22  -off bactrim  -may need to consider rituxan infusion in light of podocytopathy if renal function/proteinuria does not continue to improve  Thankfully, sCr stable and proteinuria continues to improve     2  Hyponatremia - sNa 134, monitor BMP     3  Hyperphosphatemia - phos 4 3, in setting of JANEL  Repeat phos  Likely d/t decreased excretion in setting of JANEL         4  Proteinuria - UpCr in setting of IgAN shows UpCr improved from 18 8g to 1 5g as of 8/15/22, with latest UpCr 202 mg/g as of 4/14/23(improved)  -consider ACEi initiation if UpCr rises above 1g      5  HTN - BP acceptable for age/CKD in office  Continue metoprolol 25mg twice daily, flomax, torsemide 10mg daily  -no longer on Cardura 1 mg at bedtime, amlodipine 5 mg daily,      6  Anemia ? D/t underlying GN/CKD - Hgb 9, received blood transfusion in the past, monitor CBC, Hgb has dropped s/p MI  Will repeat CBC and determine need for ongoing epogen infusions  7  LE edema likely d/t nephrotic syndrome as well as history of chromic diastolic CHF (grade 2 diastolic dysfunction noted on April 2022 echo)  - continue torsemide 10mg daily, will monitor K/SCr every 2 weeks  Monitor daily weight/BP readings  Repeat CMP, CBC next week  Continue torsemide 10mg daily for edema/nephrotic syndrome  RTC in 3 months

## 2023-04-28 ENCOUNTER — TELEPHONE (OUTPATIENT)
Dept: NEPHROLOGY | Facility: CLINIC | Age: 73
End: 2023-04-28

## 2023-04-28 NOTE — TELEPHONE ENCOUNTER
----- Message from Whitman Collet, DO sent at 4/27/2023  3:06 PM EDT -----  Regarding: office f/u appt  Patient should follow up in the office in 3 months  Please schedule follow up with me  Thanks

## 2023-04-28 NOTE — TELEPHONE ENCOUNTER
I called patient to schedule his next follow up with Dr Mcmullen Eye  Pt is currently at a doctors appointment and will call the office back to schedule

## 2023-05-02 ENCOUNTER — CLINICAL SUPPORT (OUTPATIENT)
Dept: CARDIAC REHAB | Facility: HOSPITAL | Age: 73
End: 2023-05-02

## 2023-05-02 ENCOUNTER — TELEPHONE (OUTPATIENT)
Dept: CARDIOLOGY CLINIC | Facility: CLINIC | Age: 73
End: 2023-05-02

## 2023-05-02 DIAGNOSIS — Z95.5 S/P CORONARY ARTERY STENT PLACEMENT: Primary | ICD-10-CM

## 2023-05-02 NOTE — PROGRESS NOTES
Exercise session details and Pre Submax ETT  Patient came for 1st session of rehab - He was NSR, BBB  He reported that all day yesterday and this morning he felt fatigued, lightheaded, some SOB - could be attributed to being in Afib? He reports staying hydrated all day  This afternoon he feels great  He has been compliant with taking the increased Metoprolol  Just wanted to give an update

## 2023-05-03 ENCOUNTER — TELEPHONE (OUTPATIENT)
Dept: CARDIOLOGY CLINIC | Facility: CLINIC | Age: 73
End: 2023-05-03

## 2023-05-03 DIAGNOSIS — I25.2 HISTORY OF ST ELEVATION MYOCARDIAL INFARCTION (STEMI): ICD-10-CM

## 2023-05-03 DIAGNOSIS — I25.10 CORONARY ARTERY DISEASE INVOLVING NATIVE CORONARY ARTERY OF NATIVE HEART WITHOUT ANGINA PECTORIS: Chronic | ICD-10-CM

## 2023-05-03 NOTE — TELEPHONE ENCOUNTER
Good morning  This is Aniya Haynes calling [employer] [id number] on the patient of doctor pull up thematic whatever now and I need to talk to someone about my prescriptions  I called the prescription line, but that didn't help me at all  Please give me a call back   I have a lot of new prescriptions  I had a heart attack and things have changed and I need to talk to someone  Ordering  Thank you so much  Have a good day

## 2023-05-04 ENCOUNTER — CLINICAL SUPPORT (OUTPATIENT)
Dept: CARDIAC REHAB | Facility: HOSPITAL | Age: 73
End: 2023-05-04

## 2023-05-04 DIAGNOSIS — Z95.5 S/P CORONARY ARTERY STENT PLACEMENT: Primary | ICD-10-CM

## 2023-05-04 RX ORDER — TORSEMIDE 10 MG/1
10 TABLET ORAL DAILY
Qty: 30 TABLET | Refills: 0 | Status: SHIPPED | OUTPATIENT
Start: 2023-05-04

## 2023-05-04 RX ORDER — POTASSIUM CHLORIDE 750 MG/1
10 CAPSULE, EXTENDED RELEASE ORAL 2 TIMES DAILY
Qty: 30 CAPSULE | Refills: 0 | Status: SHIPPED | OUTPATIENT
Start: 2023-05-04

## 2023-05-05 ENCOUNTER — DOCUMENTATION (OUTPATIENT)
Dept: NEPHROLOGY | Facility: CLINIC | Age: 73
End: 2023-05-05

## 2023-05-05 ENCOUNTER — TELEPHONE (OUTPATIENT)
Dept: FAMILY MEDICINE CLINIC | Facility: HOSPITAL | Age: 73
End: 2023-05-05

## 2023-05-05 ENCOUNTER — ANTICOAG VISIT (OUTPATIENT)
Dept: FAMILY MEDICINE CLINIC | Facility: HOSPITAL | Age: 73
End: 2023-05-05

## 2023-05-05 DIAGNOSIS — I48.0 PAROXYSMAL A-FIB (HCC): ICD-10-CM

## 2023-05-05 DIAGNOSIS — G47.00 INSOMNIA, UNSPECIFIED TYPE: ICD-10-CM

## 2023-05-05 LAB
CREAT 24H UR-MCNC: 80.4 MG/DL
EXT BILIRUBIN, UA: NEGATIVE
EXT BLOOD, UA: NEGATIVE
EXT COLOR, UA: YELLOW
EXT DIFF-ABS BASOPHILS: 0
EXT DIFF-ABS EOSINOPHILS: 0.1
EXT DIFF-ABS LYMPHOCYTES: 1.1
EXT DIFF-ABS MONOCYTES: 0.5
EXT DIFF-ABS NEUTROPHILS: 3.3
EXT GLUCOSE BLD: 97
EXT GLUCOSE, UA: NEGATIVE
EXT KETONES: NEGATIVE
EXT NITRITE, UA: NEGATIVE
EXT PH, UA: 5
EXT PROTEIN, UA: NEGATIVE
EXT SPECIFIC GRAVITY, UA: 1.01
EXT UROBILINOGEN: 0.2
EXTERNAL ALBUMIN: 4.4
EXTERNAL ALK PHOS: 80
EXTERNAL ALT: 13
EXTERNAL AST: 22
EXTERNAL BACTERIA (UA): NORMAL
EXTERNAL BICARBONATE: 24
EXTERNAL BUN: 35
EXTERNAL CALCIUM: 9.3
EXTERNAL CASTS (UA): NORMAL
EXTERNAL CHLORIDE: 100
EXTERNAL CREATININE: 2.47
EXTERNAL HEMATOCRIT: 28 %
EXTERNAL HEMOGLOBIN: 9.3 G/DL
EXTERNAL MCV: 88
EXTERNAL PHOSPHORUS: 2.8
EXTERNAL PLATELET COUNT: 188 K/ΜL
EXTERNAL POTASSIUM: 4.1
EXTERNAL RBC (UA): NORMAL
EXTERNAL RBC: 3.14
EXTERNAL RDW: 13.7
EXTERNAL SODIUM: 138
EXTERNAL T.BILIRUBIN: 0.4
EXTERNAL TOTAL PROTEIN: 6.8
EXTERNAL WBC (UA): NORMAL
EXTERNAL WBC: 5
PROT SNV-MCNC: 14.1 MG/DL
PROTEIN/CREAT RATIO (HISTORICAL): 175

## 2023-05-05 RX ORDER — WARFARIN SODIUM 5 MG/1
TABLET ORAL
Qty: 78 TABLET | Refills: 0 | Status: SHIPPED | OUTPATIENT
Start: 2023-05-05

## 2023-05-05 RX ORDER — ZOLPIDEM TARTRATE 10 MG/1
10 TABLET ORAL
Qty: 90 TABLET | Refills: 0 | Status: SHIPPED | OUTPATIENT
Start: 2023-05-05

## 2023-05-05 NOTE — TELEPHONE ENCOUNTER
Spoke to pt  Prev dr Rosalino Nguyen pt  Seeing card (Dr Melendez Friday) currently for his new cardiac issues  New pcp is ef here in our office  Inr high at 4 6 yest 5/4  Current dose is 5 mg M/W/Sat,   2 5 mg rest    Dx--factor V leiden mutation ,  hx of dvt, parox afib  He already took his coumadin today  I explained to take in the evening, but Im not sure if he will  Uses [facility], will need new standing order  I tt'd alberto casillas with card and will await her answer to some questions  Do they want to handle coumadin? ?

## 2023-05-05 NOTE — TELEPHONE ENCOUNTER
Dr antony to handle coumadin  Pt already took coumadin today---HOLD TOMORROW--SAT 5/6, TAKE 2 5 MG Sunday  RECHECK INR ON MON 5/8    MESS TO PT

## 2023-05-08 DIAGNOSIS — I48.0 PAROXYSMAL A-FIB (HCC): Primary | ICD-10-CM

## 2023-05-09 ENCOUNTER — ANTICOAG VISIT (OUTPATIENT)
Dept: FAMILY MEDICINE CLINIC | Facility: HOSPITAL | Age: 73
End: 2023-05-09

## 2023-05-09 ENCOUNTER — TELEPHONE (OUTPATIENT)
Dept: FAMILY MEDICINE CLINIC | Facility: HOSPITAL | Age: 73
End: 2023-05-09

## 2023-05-09 ENCOUNTER — CLINICAL SUPPORT (OUTPATIENT)
Dept: CARDIAC REHAB | Facility: HOSPITAL | Age: 73
End: 2023-05-09

## 2023-05-09 DIAGNOSIS — Z95.5 S/P CORONARY ARTERY STENT PLACEMENT: Primary | ICD-10-CM

## 2023-05-09 LAB
INR PPP: 1.8 (ref 0.9–1.2)
PROTHROMBIN TIME: 18.4 SEC (ref 9.1–12)

## 2023-05-11 ENCOUNTER — CLINICAL SUPPORT (OUTPATIENT)
Dept: CARDIAC REHAB | Facility: HOSPITAL | Age: 73
End: 2023-05-11

## 2023-05-11 DIAGNOSIS — Z95.5 S/P CORONARY ARTERY STENT PLACEMENT: Primary | ICD-10-CM

## 2023-05-12 DIAGNOSIS — I25.10 CORONARY ARTERY DISEASE INVOLVING NATIVE CORONARY ARTERY OF NATIVE HEART WITHOUT ANGINA PECTORIS: Chronic | ICD-10-CM

## 2023-05-12 DIAGNOSIS — I25.2 HISTORY OF ST ELEVATION MYOCARDIAL INFARCTION (STEMI): ICD-10-CM

## 2023-05-16 ENCOUNTER — ANTICOAG VISIT (OUTPATIENT)
Dept: FAMILY MEDICINE CLINIC | Facility: HOSPITAL | Age: 73
End: 2023-05-16

## 2023-05-16 ENCOUNTER — TELEPHONE (OUTPATIENT)
Dept: FAMILY MEDICINE CLINIC | Facility: CLINIC | Age: 73
End: 2023-05-16

## 2023-05-16 ENCOUNTER — CLINICAL SUPPORT (OUTPATIENT)
Dept: CARDIAC REHAB | Facility: HOSPITAL | Age: 73
End: 2023-05-16

## 2023-05-16 DIAGNOSIS — Z79.01 ANTICOAGULATED ON WARFARIN: ICD-10-CM

## 2023-05-16 DIAGNOSIS — D68.51 FACTOR V LEIDEN MUTATION (HCC): ICD-10-CM

## 2023-05-16 DIAGNOSIS — Z86.718 HISTORY OF DVT OF LOWER EXTREMITY: ICD-10-CM

## 2023-05-16 DIAGNOSIS — Z95.5 S/P CORONARY ARTERY STENT PLACEMENT: Primary | ICD-10-CM

## 2023-05-16 DIAGNOSIS — I48.0 PAROXYSMAL A-FIB (HCC): Primary | ICD-10-CM

## 2023-05-16 LAB — INR PPP: 1.8 (ref 0.84–1.19)

## 2023-05-16 NOTE — TELEPHONE ENCOUNTER
Patient calls in for clarification for his INR standing lab orders, I contacted Dr Lita Habermann office and spoke with Marybeth Viera, per Marybeth Viera she will place new standing orders for [facility] under Dr Lita Habermann  Patient notified to complete labs from Dr Lita Habermann only

## 2023-05-17 DIAGNOSIS — L30.9 ECZEMA, UNSPECIFIED TYPE: ICD-10-CM

## 2023-05-17 RX ORDER — MOMETASONE FUROATE 1 MG/G
CREAM TOPICAL
Qty: 45 G | Refills: 0 | Status: SHIPPED | OUTPATIENT
Start: 2023-05-17

## 2023-05-17 RX ORDER — POTASSIUM CHLORIDE 750 MG/1
CAPSULE, EXTENDED RELEASE ORAL
Qty: 180 CAPSULE | Refills: 1 | Status: SHIPPED | OUTPATIENT
Start: 2023-05-17

## 2023-05-18 ENCOUNTER — APPOINTMENT (OUTPATIENT)
Dept: CARDIAC REHAB | Facility: HOSPITAL | Age: 73
End: 2023-05-18
Payer: COMMERCIAL

## 2023-05-18 ENCOUNTER — OFFICE VISIT (OUTPATIENT)
Dept: CARDIOLOGY CLINIC | Facility: CLINIC | Age: 73
End: 2023-05-18

## 2023-05-18 VITALS
WEIGHT: 249 LBS | HEIGHT: 71 IN | OXYGEN SATURATION: 99 % | SYSTOLIC BLOOD PRESSURE: 112 MMHG | DIASTOLIC BLOOD PRESSURE: 66 MMHG | HEART RATE: 82 BPM | BODY MASS INDEX: 34.86 KG/M2

## 2023-05-18 DIAGNOSIS — I25.5 ISCHEMIC CARDIOMYOPATHY: ICD-10-CM

## 2023-05-18 DIAGNOSIS — I25.10 CORONARY ARTERY DISEASE INVOLVING NATIVE CORONARY ARTERY OF NATIVE HEART WITHOUT ANGINA PECTORIS: Chronic | ICD-10-CM

## 2023-05-18 DIAGNOSIS — I50.22 CHRONIC SYSTOLIC HEART FAILURE (HCC): ICD-10-CM

## 2023-05-18 DIAGNOSIS — I25.10 CORONARY ARTERY DISEASE INVOLVING NATIVE CORONARY ARTERY OF NATIVE HEART WITHOUT ANGINA PECTORIS: Primary | Chronic | ICD-10-CM

## 2023-05-18 DIAGNOSIS — I25.2 HISTORY OF ST ELEVATION MYOCARDIAL INFARCTION (STEMI): ICD-10-CM

## 2023-05-18 DIAGNOSIS — I48.0 PAROXYSMAL A-FIB (HCC): ICD-10-CM

## 2023-05-18 RX ORDER — TORSEMIDE 20 MG/1
20 TABLET ORAL DAILY
Qty: 90 TABLET | Refills: 3 | Status: SHIPPED | OUTPATIENT
Start: 2023-05-18

## 2023-05-18 RX ORDER — POTASSIUM CHLORIDE 750 MG/1
10 CAPSULE, EXTENDED RELEASE ORAL 2 TIMES DAILY
Qty: 180 CAPSULE | Refills: 3 | OUTPATIENT
Start: 2023-05-18

## 2023-05-18 RX ORDER — NITROGLYCERIN 0.4 MG/1
0.4 TABLET SUBLINGUAL
Qty: 30 TABLET | Refills: 0 | Status: SHIPPED | OUTPATIENT
Start: 2023-05-18

## 2023-05-18 NOTE — PATIENT INSTRUCTIONS
Nitroglycerin Tablets (0 04 mg tablets) -- Dissolvable Under the Tongue    1  If you have an episode of chest pain/pressure that does not quickly resolve with rest, dissolve one nitroglycerin tablet under your tongue  2  Wait 3-5 minutes  3  If pain/pressure has not resolved, begin dissolving a second tablet under your tongue and call 9-1-1  Nitroglycerin (By mouth)   Nitroglycerin ([id number])  Treats or prevents angina (chest pain)  This medicine is a nitrate  Brand Name(s): Nitro-Time, Nitrostat   There may be other brand names for this medicine  When This Medicine Should Not Be Used: This medicine is not right for everyone  Do not use it if you had an allergic reaction to nitroglycerin or similar medicines  How to Use This Medicine:   Long Acting Capsule, Tablet, Long Acting Tablet  Your doctor will tell you how much medicine to use  Do not use more than directed  Sit down before you take this medicine, because it could make you lightheaded  Most people use this medicine for only part of the day or as needed  Extended-release capsule or extended-release tablet:   Take on an empty stomach with a full glass of water  Swallow whole  Do not crush, break, or chew it  Buccal tablet:   Place it between your gum and upper cheek or upper lip  Let the tablet dissolve slowly in your mouth over several hours  Do not chew, crush, or swallow the tablet or put it under your tongue  Avoid drinking anything hot while the tablet is in your mouth and do not touch the tablet with your tongue  Do not go to sleep with a buccal tablet in your mouth  Sublingual tablet:   Wet the tablet with saliva and place it under your tongue or inside your cheek  Let the tablet dissolve  Do not chew, crush, or swallow the tablet  Wait 5 minutes  If you still have pain, take a second tablet  Do not take more than 3 tablets in 15 minutes  If you still have pain, this is an emergency  Call 911   Do not drive yourself to the [facility] the [place] may use this medicine 5 to 10 minutes before an activity that can cause angina  This may help prevent the attack  Read and follow the patient instructions that come with this medicine  Talk to your doctor or pharmacist if you have any questions  Missed dose: Take a dose as soon as you remember  If it is almost time for your next dose, wait until then and take a regular dose  Do not take extra medicine to make up for a missed dose  Store the medicine in a closed container at room temperature, away from heat, moisture, and direct light  Store the sublingual tablets at room temperature in the original glass container, away from heat, moisture, and direct light  How to Store and Dispose of This Medicine:   Store the medicine at room temperature in a closed container, away from heat, moisture, and direct light  Ask your pharmacist, doctor, or health caregiver about the best way to dispose of any outdated medicine or medicine no longer needed  Keep all medicine out of the reach of children and never share your medicine with anyone  Drugs and Foods to Avoid:   Ask your doctor or pharmacist before using any other medicine, including over-the-counter medicines, vitamins, and herbal products  Do not use this medicine if you are also using avanafil, riociguat, sildenafil, tadalafil, or vardenafil  Some medicines can affect how nitroglycerin works  Tell your doctor if you are using any of the following: Alteplase, aspirin, heparin  Blood pressure medicine  Diuretic (water pill)  Ergot medicine  Tell your doctor if you are using any medicine that makes your mouth dry, such as medicines that treat depression  Do not drink alcohol while you are using this medicine  Warnings While Using This Medicine:   Tell your doctor if you are pregnant or breastfeeding, or if you have kidney disease, anemia, congestive heart failure, enlarged heart, low blood pressure, or a recent heart attack   Tell your doctor if you have a history of a head injury  This medicine may make you dizzy or lightheaded  Do not drive or do anything else that could be dangerous until you know how this medicine affects you  These symptoms may be worse if you drink alcohol  Medicines that treat chest pain sometimes cause headaches when you first start using it  This is normal  Do not stop using the medicine or change the time you use it to avoid headaches  Ask your doctor if you can take aspirin or acetaminophen to treat the headache  Tell any doctor or dentist who treats you that you are using this medicine  This medicine may affect certain medical test results  Keep all medicine out of the reach of children  Never share your medicine with anyone  Possible Side Effects While Using This Medicine:   Call your doctor right away if you notice any of these side effects: Allergic reaction: Itching or hives, swelling in your face or hands, swelling or tingling in your mouth or throat, chest tightness, trouble breathing  Blurred vision, dry mouth  Increased chest pain, fast or slow heartbeat  Severe or ongoing dizziness, lightheadedness, or fainting  Throbbing, severe, or ongoing headache, confusion, low fever, or trouble seeing  Trouble breathing, cold sweat, blue skin, lips, or nails  Warmth or redness in your face, neck, arms, or upper chest  If you notice these less serious side effects, talk with your doctor:   Nausea, vomiting, weakness  If you notice other side effects that you think are caused by this medicine, tell your doctor  Call your doctor for medical advice about side effects  You may report side effects to FDA at 3-343-FDA-0331    © Copyright [employer] [facility] 2022 Information is for End User's use only and may not be sold, redistributed or otherwise used for commercial purposes  The above information is an  only  It is not intended as medical advice for individual conditions or treatments   Talk to your doctor, nurse or pharmacist before following any medical regimen to see if it is safe and effective for you

## 2023-05-18 NOTE — PROGRESS NOTES
Cardiology Outpatient Follow-Up Note - Gilmer Payan 68 y o  male MRN: [id number]      Assessment/Plan:  1  Coronary artery disease involving native coronary artery of native heart without angina pectoris  With recent STEMI April 2023  Continue Plavix 75 mg daily with warfarin  Continue high intensity statin, atorvastatin 80 mg daily  - torsemide (DEMADEX) 20 mg tablet; Take 1 tablet (20 mg total) by mouth daily  Dispense: 90 tablet; Refill: 3  - nitroglycerin (NITROSTAT) 0 4 mg SL tablet; Place 1 tablet (0 4 mg total) under the tongue every 5 (five) minutes as needed for chest pain  Dispense: 30 tablet; Refill: 0    2  Ischemic cardiomyopathy  Last LVEF 30 to 35% immediately post MI on 4/10/2023  He has been revascularized but his medical therapy has been suboptimal due to hypotension  He is presently no longer on ACE inhibitor or ARB  He has been on beta-blocker uninterrupted, metoprolol XL  He had been euvolemic on torsemide 20 mg daily, reduced to 10 mg daily when hypotensive and now possibly mildly hypervolemic again  Continue metoprolol XL 25 mg twice daily  Increase torsemide back to 20 mg daily  Recheck echocardiogram in about 2 weeks, which will reflect 6 weeks post revascularization and medical therapy (as tolerated)  Did discuss with patient if LVEF remains under 35% it would be an indication for AICD  He is presently wearing LifeVest     - Echo follow up/limited w/ contrast if indicated; Future    3  Chronic systolic heart failure ([address] )  As above  - Echo follow up/limited w/ contrast if indicated; Future    4  Paroxysmal A-fib ([address] )  He may be more aware of his paroxysms now that he has reduced EF  Previously on rhythm control with flecainide now discontinued due to ischemic heart disease  Continue rate control with metoprolol XL 25 mg twice daily    If antiarrhythmic therapy is warranted, would need to consider inpatient admission for Tikosyn load versus starting "amiodarone  Continue warfarin for thromboembolic prophylaxis  He is also on warfarin for factor V Leiden deficiency  May be reasonable to change this in the near future to [id number] if financially feasible for patient  5  History of ST elevation myocardial infarction (STEMI)  - torsemide (DEMADEX) 20 mg tablet; Take 1 tablet (20 mg total) by mouth daily  Dispense: 90 tablet; Refill: 3    We discussed his prospects for elective TKA today  I recommend waiting at least 6 months from his recent MI for elective orthopedic surgery  We will see Saadia Finn back in 1 month for routine follow-up  Subjective:     HPI: Saadia Finn is a 68y o  year old male  with paroxysmal atrial fibrillation, congestive heart failure, hypertension, moderate aortic stenosis, CKD 3B to 4, recent STEMI April 2023 with PCI to the proximal to mid LAD  Subsequently, developed ischemic cardiomyopathy with LVEF of 30 to 35%  He presents today for follow-up  Last OV 4/18/23 he was hypotensive and sent to [facility]  He was symptomatic with lightheadedness, fatigue, and appeared pale  He was given IVF and some anti-HTN were stopped  He was discharged with metoprolol XL 25 mg daily and torsemide 10 mg daily  At rehab on 4/25/23 he was having AF with RVR and his metoprolol XL was increased to 25 mg BID  He is still wearing a LifeVest  He is not sure if he would want an AICD if indicated, \"If my ticket is up it's up\"  His wife openly admits she thinks he is depressed  He agrees  His primary complaint today is the severe pain of his knees, and states he needs a knee replacement but it keeps getting delayed for other health reasons  He is upset/disappointed that his recent MI would push potential orthopedic surgery out  He has not been having chest pain during cardiac rehab, pressure or shortness of breath   He does think he is experiencing his AF more, feeling more tired, \"no energy\", and feeling irregular in his " "chest     He did have some brief jaw pain while watching TV last night but it has not been occurring with exertion such as day to day activities or with cardiac rehab  Cardiac Testing:    Cardiac Cath 4/9/23 Impression       •  Successful PCI w/ non-overlapping ROBERT x2 of sequential prox & mid LAD culprit lesions  •  Residual mid RCA 80% diffuse lesion    Echo 4/10/23 Interpretation Summary       •  Left Ventricle: Left ventricular cavity size is normal  Wall thickness is moderately increased  There is moderate concentric hypertrophy  The left ventricular ejection fraction is 30-35%  Global longitudinal strain is reduced at -6%  There is moderate global hypokinesis with severe hypokinesis of the mid anterior, mid anteroseptal, mid anterolateral, and all apical segments  There is regional variation of the inferior wall  Diastolic function is moderately abnormal, consistent with grade II (pseudonormal) relaxation  •  Right Ventricle: Wall motion is abnormal  There is severe hypokinesis of the apical free wall  •  Mitral Valve: There is mild regurgitation  •  Aortic Valve: There is moderate stenosis  The aortic valve velocity is increased due to stenosis but lower than expected due to the presence of decreased flow          EKGs, personally reviewed:  n/a    Relevant Labs & Results:  CMP 5/4/23 --  Cr 2 5, K 4 1  Lipid panel 4/9/23 -- , , HDL 45,  mg/dL -- on pravastatin 40 mg   LDL direct 4/9/23 -- 139 mg/dL       ROS:  Review of Systems:  Review of Systems    Objective:     Vitals:   Vitals:    05/18/23 1339   BP: 112/66   BP Location: Right arm   Patient Position: Sitting   Cuff Size: Large   Pulse: 82   SpO2: 99%   Weight: 113 kg (249 lb)   Height: 5' 11\" (1 803 m)    Body surface area is 2 32 meters squared    Wt Readings from Last 3 Encounters:   05/18/23 113 kg (249 lb)   04/27/23 111 kg (245 lb)   04/26/23 109 kg (240 lb)       Physical Exam:  General: Eda Zepeda is a well " appearing male, in no acute distress, sitting comfortably  HEENT: moist mucous membranes, EOMI  Neck:  No JVD, supple, trachea midline  Cardiovascular: unremarkable S1/S2, irregularly irregular rhythm, normal rate, 3/6 SM  Pulmonary: normal respiratory effort, CTAB  Abdomen: soft and nondistended  Extremities: +1 pitting b/l lower extremity edema  Warm and well perfused extremities  Neuro: no focal motor deficits, AAOx3 (person, place, time)  Psych: Normal mood and affect, cooperative      Medications (at the START of this encounter): Outpatient Medications Prior to Visit   Medication Sig Dispense Refill   • acetaminophen (TYLENOL) 500 mg tablet Take 500 mg by mouth if needed for mild pain     • atorvastatin (LIPITOR) 80 mg tablet Take 1 tablet (80 mg total) by mouth every evening 90 tablet 3   • clopidogrel (PLAVIX) 75 mg tablet Take 1 tablet (75 mg total) by mouth daily 90 tablet 3   • DULoxetine (CYMBALTA) 60 mg delayed release capsule Take 1 capsule (60 mg total) by mouth daily 90 capsule 1   • gabapentin (NEURONTIN) 300 mg capsule TAKE 1 CAPSULE BY MOUTH  DAILY AT BEDTIME 90 capsule 1   • metoprolol succinate (TOPROL-XL) 25 mg 24 hr tablet Take 1 tablet (25 mg total) by mouth 2 (two) times a day 60 tablet 1   • mometasone (ELOCON) 0 1 % cream APPLY TO AFFECTED AREA TOPICALLY EVERY DAY 45 g 0   • potassium chloride (MICRO-K) 10 MEQ CR capsule TAKE 1 CAPSULE BY MOUTH 2 TIMES A DAY   180 capsule 1   • tamsulosin (FLOMAX) 0 4 mg TAKE 1 CAPSULE BY MOUTH  DAILY WITH DINNER 90 capsule 1   • torsemide (DEMADEX) 10 mg tablet Take 1 tablet (10 mg total) by mouth daily 30 tablet 0   • warfarin (COUMADIN) 5 mg tablet TAKE 2 5MG (1/2 TAB) BY  MOUTH DAILY EXCEPT  WED   AND SAT TAKE 5MG ( 1 TAB) DAILY 78 tablet 0   • zolpidem (AMBIEN) 10 mg tablet Take 1 tablet (10 mg total) by mouth daily at bedtime as needed for sleep 90 tablet 0   • prednisoLONE acetate (PRED FORTE) 1 % ophthalmic suspension INSTILL ONE DROP INTO THE "RIGHT EYE 4 TIMES A DAY       No facility-administered medications prior to visit  Time Spent:  Total time (face-to-face and non-face-to-face) spent on today's visit was 40 minutes  This includes preparation for the visits (i e  reviewing test results), performance of a medically appropriate history and examination, and orders for medications, tests or other procedures  This time is exclusive of procedures performed and time spent teaching  BILLING BASED ON TIME    This note was completed in part utilizing "[employer] voice recognition software  Grammatical errors, random word insertion, spelling mistakes, occasional wrong word or \"sound-alike\" substitutions and incomplete sentences may be an occasional consequence of the system secondary to software limitations, ambient noise and hardware issues  At the time of dictation, efforts were made to edit, clarify and /or correct errors  Please read the chart carefully and recognize, using context, where substitutions have occurred  If you have any questions or concerns about the context, text or information contained within the body of this dictation, please contact myself, the provider, for further clarification      "

## 2023-05-23 ENCOUNTER — ANTICOAG VISIT (OUTPATIENT)
Dept: FAMILY MEDICINE CLINIC | Facility: HOSPITAL | Age: 73
End: 2023-05-23

## 2023-05-23 ENCOUNTER — CLINICAL SUPPORT (OUTPATIENT)
Dept: CARDIAC REHAB | Facility: HOSPITAL | Age: 73
End: 2023-05-23

## 2023-05-23 DIAGNOSIS — Z95.5 S/P CORONARY ARTERY STENT PLACEMENT: Primary | ICD-10-CM

## 2023-05-24 ENCOUNTER — DOCUMENTATION (OUTPATIENT)
Dept: NEPHROLOGY | Facility: CLINIC | Age: 73
End: 2023-05-24

## 2023-05-24 DIAGNOSIS — I25.2 HISTORY OF ST ELEVATION MYOCARDIAL INFARCTION (STEMI): ICD-10-CM

## 2023-05-24 DIAGNOSIS — I25.10 CORONARY ARTERY DISEASE INVOLVING NATIVE CORONARY ARTERY OF NATIVE HEART WITHOUT ANGINA PECTORIS: Chronic | ICD-10-CM

## 2023-05-24 LAB
CREAT 24H UR-MCNC: 91.8 MG/DL
EXT BILIRUBIN, UA: NEGATIVE
EXT BLOOD, UA: ABNORMAL
EXT COLOR, UA: YELLOW
EXT DIFF-ABS BASOPHILS: 0
EXT DIFF-ABS EOSINOPHILS: 0.1
EXT DIFF-ABS LYMPHOCYTES: 1.1
EXT DIFF-ABS MONOCYTES: 0.4
EXT DIFF-ABS NEUTROPHILS: 3.4
EXT GLUCOSE BLD: 131
EXT GLUCOSE, UA: NEGATIVE
EXT KETONES: NEGATIVE
EXT NITRITE, UA: NEGATIVE
EXT PH, UA: 5
EXT PROTEIN, UA: ABNORMAL
EXT SPECIFIC GRAVITY, UA: 1.01
EXT UROBILINOGEN: 0.2
EXTERNAL ALBUMIN: 4.2
EXTERNAL ALK PHOS: 76
EXTERNAL ALT: 14
EXTERNAL AST: 18
EXTERNAL BACTERIA (UA): ABNORMAL
EXTERNAL BICARBONATE: 23
EXTERNAL BUN: 44
EXTERNAL CALCIUM: 9.4
EXTERNAL CASTS (UA): ABNORMAL
EXTERNAL CHLORIDE: 100
EXTERNAL CREATININE: 2.49
EXTERNAL HEMATOCRIT: 29 %
EXTERNAL HEMOGLOBIN: 9.5 G/DL
EXTERNAL MCV: 90
EXTERNAL PHOSPHORUS: 3.6
EXTERNAL PLATELET COUNT: 209 K/ÂΜL
EXTERNAL POTASSIUM: 4.3
EXTERNAL RBC (UA): ABNORMAL
EXTERNAL RBC: 3.2
EXTERNAL RDW: 13.4
EXTERNAL SODIUM: 139
EXTERNAL T.BILIRUBIN: 0.4
EXTERNAL TOTAL PROTEIN: 7.2
EXTERNAL WBC (UA): ABNORMAL
EXTERNAL WBC: 5.1
PROT SNV-MCNC: 25.6 MG/DL
PROTEIN/CREAT RATIO (HISTORICAL): 279

## 2023-05-24 RX ORDER — METOPROLOL SUCCINATE 25 MG/1
TABLET, EXTENDED RELEASE ORAL
Qty: 180 TABLET | Refills: 1 | Status: SHIPPED | OUTPATIENT
Start: 2023-05-24

## 2023-05-24 NOTE — PROGRESS NOTES
Cardiac Rehabilitation Plan of Care   30 Day Reassessment        Today's date: 2023   # of Exercise Sessions Completed: 7  Patient name: Ralph Charlton      : 1950  Age: 68 y o  MRN: [id number]  Referring Physician: Chris Barclay, *  Cardiologist: Dr Corinne Botello   Provider: Phoebe Kendrick  Clinician: Erasto Kitchen, MS, CEP      Dx:   Encounter Diagnosis   Name Primary? • S/P coronary artery stent placement Yes     Date of onset: 2023      SUMMARY OF PROGRESS:  Mr Nilson Alvarez has started his cardiac rehabilitation program and has attended 7 sessions of his rehab program over the past 30 days  He is attending regularly 2x/week and is progressing his exercise times and intensities appropriately  He is currently completing 40-45 min of aerobic exercise at 2 6-2 9 METs  He shows normal hemodynamic response to exercise  Telemetry shows NSR  Will continue to progress exercise times and intensities as he tolerates over next 30 days of rehab  His goals for his program are to increase lower body strength with stamina with walking/balance, get back to hunting and fishing, and be able to help his wife get better/care taker  He feels he is on target with his goals at 30 days  He is increasing his strength and endurance with exercise  He is limited with exercise due to knee pain, but he pushes as much as he can to complete his rehab sessions  He is using NuStep, TM, and UBE  He will be starting a light strength training program 2x/week also  He has been participating in weekly education sessions on cardiac risk factor management  Depression screening using the PHQ-9 interprets the patient's score of 13  as  10-14 = Moderate Depression, showing no improvement over past 30 days (12 initial score)  SERGE-7 screening tool for anxiety= 0 which is an improvement from score of 8 initially  When addressed, the patient admits to having depression/anxiety  He has been seeing a psychologist 1x/week    Patient reports excellent social/emotional support from family  Information to utilize [employer] was provided as well as contact information for counseling through [employer]  PHQ-9 score will be reassessed in 30 days due to an initial score revealing concern for depression  They rate stress 8/10 with the following stressors: wife currently declining health and his recent health concerns  This is contributing to his depression but he reports he tries to maintain a positive outlook each day  Stress management will be reviewed  The patient is a non-smoker  Patient admits to 100% medication compliance  They report the following physical limitations: lower body weakness/balance - using walker when outside of house       Medication compliance: Yes   Comments: Pt reports to be compliant with medications  Fall Risk: Moderate   Comments: Patient uses walking assist device (walker/cane/rollator) and Denies a fall in the past 6 months    EKG Interpretation: Rapid Afib       EXERCISE ASSESSMENT and PLAN    Exercise Prescription:      Frequency: 2 days/week   Supplement with home exercise 2+ days/wk as tolerated       Minutes: 40-50        METS: 2 5-3 5            HR: RHR+ 30 beats above, monitor HR closely    RPE: 4-6         Modalities: Treadmill, UBE and NuStep      30 Day Goals for Exercise Progression:    Frequency: 2 days/week of cardiac rehab       Supplement with home exercise 2+ days/wk as tolerated    Minutes: 40-45                              >150 mins/wk of moderate intensity exercise   METS: 2 6-2 9   HR: RHR+30 beats, monitor HR closely     RPE: 3-4   Modalities: Treadmill, UBE, [employer], NuStep and Recumbent bike    Strength trainin-3 days / week  12-15 repetitions  1-2 sets per modality   Will be added following 2-3 weeks of monitored exercise sessions   Modalities: Pull Downs, Lateral Raise, Arm Extension, Arm Curl, Sit to [employer], Upright Rows, Front Raises and Shoulder Shrugs    Home Exercise: none-have encouraged walking at home 2x/week on non-rehab days for 10-15 min as toelrated    Goals: 10% improvement in functional capacity - based on max METs achieved in fitness assessment, improved DASI score by 10%, Increase in exercise capacity by 40% - based on peak METs tolerated in cardiac rehab exercise session, Exercise 5 days/wk, >150mins/wk of moderate intensity exercise, Resume ADLs with increased strength, Attend Rehab regularly, Decrease sitting time and start a home exercise program    Progression Toward Goals:  Pt is progressing and showing improvement  toward the following goals:  increasing strength and endurance with exercise to be able to normal activities and care for his wife, increasing functional capacity shown by increased MET levels, attending rehab sessions 2x/week regularly  , Will continue to educate and progress as tolerated        Education: benefit of exercise for CAD risk factors, home exercise guidelines, AHA guidelines to achieve >150 mins/wk of moderate exercise, RPE scale, class: Risk Factors for Heart Disease and physical activity/exercise in extreme weather conditions   Plan:education on home exercise guidelines, home exercise 30+ mins 2 days opposite CR and Education class: Risk Factors for Heart Disease  Readiness to change: Preparation:  (Getting ready to change)       NUTRITION ASSESSMENT AND PLAN    Weight control:    Starting weight: 246 6 lbs    Current weight: 246 lb    Diabetes: N/A  A1c: 6 2    last measured: 4/9/2023    Lipid management: Discussed diet and lipid management and Last lipid profile 4/9/2023  Chol 229    HDL 45      Goals:reduced BMI to < 25, LDL <100, TRG <150, CHOL <200, decreased body fat% <25%   (M), fasting BG , Improved Rate Your Plate score  >57, 2 2-9%  wt loss, increase intake of fish, shellfish, increase intake of meatless meals, use low fat dairy, eat 3 or more servings of whole grains a day, choose low sodium processed foods, eliminate butter, Increase intake of nuts and seeds, daily saturated fat intake <7%/13g and Pt has made many changes since his event     Measurable goals were based Rate Your Plate Dietary Self-Assessment  These are the areas in which the patient could score higher on the assessment  Goals include recommendations for a heart healthy diet based on American Heart Association  Progression Toward Goals: Pt is progressing and showing improvement  toward the following goals:  doing well following healthy diet, has to follow diet to manage CKD and gets frustrated that he is battleing between what to eat with kidney disease and heart disease  Education on healthy eating and label reading  Education: heart healthy eating  low sodium diet  hydration  nutrition for  lipid management  healthy choices while dining out  portion control  education class: Heart Healthy Eating  education class:  Label Reading  Plan: Education class: Reading Food Labels, Education Class: Heart Healthy Eating, replace refined grain bread with whole grain bread, replace unhealthy snacks with fruits & vegs, switch to skim or 1% milk, avoid processed foods, remove salt shaker from table, use salt substitute like Mrs   Dash, increase utilization of fresh or dried herbs, will try new grains like brown rice, quinoa, farro, drink more water and learn how to read food labels  Readiness to change: Action:  (Changing behavior)      PSYCHOSOCIAL ASSESSMENT AND PLAN    Emotional:  Depression assessment:  PHQ-9 = 12  10-14 = Moderate Depression Medically managing with seeing a psychologist weekly   30 day PHQ-9=13 showing no improvement from intial PHQ-9  30 Day SERGE-7=0 showing improvement from initial SERGE-7            Anxiety measure:  SERGE-7 = 8  5-9 = Mild anxiety  Self-reported stress level:  8-caring for wife and worrying about her declining health  Social support: Very Good    Goals:  Reduce perceived stress to 1-3/10, improved [facility] QOL < 27, PHQ-9 - reduced severity by one level, continue medical therapy, Feelings in [facility] Score < 3, Physical Fitness in [facility] Score < 3, Overall Health in [facility] Score < 3, Quality of Life in [facility] Score < 3 , Change in Health in [place] Score < 3 , Increased interest in doing things, improved sleep, improved concentration, improved positive thoughts of well being, increased energy, stop worrying, take time to relax and Feel less anxious    Progression Toward Goals: Pt is progressing and showing improvement  toward the following goals:  continues to report feelings of depression, but anxiety has improved  He has stress related to his wife's declining health and having to be able to care for her  continues to see psychologist weekly  , Will continue to educate and progress as tolerated        Education: signs/sxs of depression, benefits of a positive support system, stress management techniques, depression and CAD, benefits of mental health counseling and class:  Stress and Your Health   Plan: Class: Stress and Your Health, PHQ-9 >5 will refer to MD, Practice relaxation techniques, Exercise, Spend time outdoors, Enjoy a hobby, Keep a positive mindset, Reduce dependence  on family, Enjoy family and Repeat PHQ-9 every 30 days if score >5  Readiness to change: Preparation:  (Getting ready to change)       OTHER CORE COMPONENTS     Tobacco:   Social History     Tobacco Use   Smoking Status Never   Smokeless Tobacco Never       Tobacco Use Intervention:   N/A:  Patient is a non-smoker     Anginal Symptoms:  SOB, nausea/vomiting and chest heaviness   NTG use: No prescription    Blood pressure:    Restin//72   Exercise: 138//72     Goals: consistent BP < 130/80, reduced dietary sodium <2300mg, moderate intensity exercise >150 mins/wk and medication compliance    Progression Toward Goals: Pt is progressing and showing improvement  toward the following goals:  BP is typically within normal resting limits, shows normal hemodynamic response to exercise, managing BP with increased exercise, medication, and low sodium intake   , Will continue to educate and progress as tolerated        Education:  understanding high blood pressure and it's relationship to CAD, low sodium diet and HTN, Education class:  Common Heart Medications and Education class: Understanding Heart Disease  Plan: Class: Understanding Heart Disease, Class: Common Heart Medications, avoid places with second hand smoke, Avoid Processed foods, engage in regular exercise, eliminate salt shaker at the table, use salt substitutes, check labels for sodium content and monitor home BP  Readiness to change: action

## 2023-05-25 ENCOUNTER — CLINICAL SUPPORT (OUTPATIENT)
Dept: CARDIAC REHAB | Facility: HOSPITAL | Age: 73
End: 2023-05-25

## 2023-05-25 DIAGNOSIS — Z95.5 S/P CORONARY ARTERY STENT PLACEMENT: Primary | ICD-10-CM

## 2023-05-30 ENCOUNTER — HOSPITAL ENCOUNTER (OUTPATIENT)
Dept: RADIOLOGY | Facility: HOSPITAL | Age: 73
Discharge: HOME | End: 2023-05-30
Attending: ORTHOPAEDIC SURGERY

## 2023-05-30 ENCOUNTER — APPOINTMENT (OUTPATIENT)
Dept: CARDIAC REHAB | Facility: HOSPITAL | Age: 73
End: 2023-05-30
Payer: COMMERCIAL

## 2023-05-30 ENCOUNTER — OFFICE VISIT (OUTPATIENT)
Dept: OBGYN CLINIC | Facility: CLINIC | Age: 73
End: 2023-05-30

## 2023-05-30 VITALS
HEIGHT: 71 IN | WEIGHT: 244 LBS | HEART RATE: 60 BPM | BODY MASS INDEX: 34.16 KG/M2 | SYSTOLIC BLOOD PRESSURE: 137 MMHG | DIASTOLIC BLOOD PRESSURE: 81 MMHG

## 2023-05-30 DIAGNOSIS — M25.561 BILATERAL KNEE PAIN: ICD-10-CM

## 2023-05-30 DIAGNOSIS — M25.562 BILATERAL KNEE PAIN: ICD-10-CM

## 2023-05-30 DIAGNOSIS — M25.561 PAIN IN BOTH KNEES, UNSPECIFIED CHRONICITY: ICD-10-CM

## 2023-05-30 DIAGNOSIS — M25.562 PAIN IN BOTH KNEES, UNSPECIFIED CHRONICITY: ICD-10-CM

## 2023-05-30 DIAGNOSIS — M17.0 BILATERAL PRIMARY OSTEOARTHRITIS OF KNEE: Primary | ICD-10-CM

## 2023-05-30 RX ORDER — KETOROLAC TROMETHAMINE 30 MG/ML
30 INJECTION, SOLUTION INTRAMUSCULAR; INTRAVENOUS
Status: COMPLETED | OUTPATIENT
Start: 2023-05-30 | End: 2023-05-30

## 2023-05-30 RX ORDER — METHYLPREDNISOLONE ACETATE 40 MG/ML
2 INJECTION, SUSPENSION INTRA-ARTICULAR; INTRALESIONAL; INTRAMUSCULAR; SOFT TISSUE
Status: COMPLETED | OUTPATIENT
Start: 2023-05-30 | End: 2023-05-30

## 2023-05-30 RX ORDER — BUPIVACAINE HYDROCHLORIDE 2.5 MG/ML
1 INJECTION, SOLUTION INFILTRATION; PERINEURAL
Status: COMPLETED | OUTPATIENT
Start: 2023-05-30 | End: 2023-05-30

## 2023-05-30 RX ADMIN — METHYLPREDNISOLONE ACETATE 2 ML: 40 INJECTION, SUSPENSION INTRA-ARTICULAR; INTRALESIONAL; INTRAMUSCULAR; SOFT TISSUE at 13:15

## 2023-05-30 RX ADMIN — BUPIVACAINE HYDROCHLORIDE 1 ML: 2.5 INJECTION, SOLUTION INFILTRATION; PERINEURAL at 13:15

## 2023-05-30 RX ADMIN — KETOROLAC TROMETHAMINE 30 MG: 30 INJECTION, SOLUTION INTRAMUSCULAR; INTRAVENOUS at 13:15

## 2023-05-30 NOTE — PROGRESS NOTES
Assessment:   Diagnosis ICD-10-CM Associated Orders   1  Pain in both knees, unspecified chronicity  M25 561 XR knee 3 vw left non injury    M25 562 XR knee 3 vw right non injury      2  Bilateral knee pain  M25 561 Ambulatory referral to Orthopedic Surgery    M25 562       3  Bilateral primary osteoarthritis of knee  M17 0           Plan:  • New xrays of the bilateral knees were obtained today and reviewed with the patient noting that the right knee has most of his OA on the alteral compartment where the left knee has most of the OA in the medial compartment; windswept knees  • On exam, he has functional but painful ROM of the knees  • Due to patient needing to wait at least 6 months until he can come off the Plavix due to his recent heart attack, 4/9/2023  • Bilateral cortisone injections were provided to the patient at today's visit  • The right knee will be aspirated and will be sent out via Synovasure at his follow up in 3 months  • We will sign consents for right knee arthroplasty at the next visit in 3 months     To do next visit:  Return in about 3 months (around 8/30/2023) for bilateral knees  The above stated was discussed in layman's terms and the patient expressed understanding  All questions were answered to the patient's satisfaction  Scribe Attestation    I,:  Michelle Robledo am acting as a scribe while in the presence of the attending physician :       I,:  Linda Ayers MD personally performed the services described in this documentation    as scribed in my presence :             Subjective:   Emeterio Frankel is a 68 y o  male who presents today for consultation for bilateral knee pain referred by Dr Marni Ling  He had cervical fusion with Dr Vish Armendariz and had MRSA that kept him bedridden for 3 month muscle atrophy  He had kidney issues due to the MRSA but did not need to go on dialysis  He was scheduled with a right total knee arthoplasty with Dr Leeann Sin 5/3/2022     His neck surgery went well and was in PT for balance and gait  He had heart attack this past Easter  He has difficulty getting through cardiac rehab  Most of his pain in the knees is when he is weight bearing  The right knee feels like it gives out  He ambulates today with a rollator  He is on Coumadin and on Plavix  He has a hx Factor V     Review of systems negative unless otherwise specified in HPI  Review of Systems   Constitutional: Negative for chills, fever and unexpected weight change  HENT: Negative for hearing loss, nosebleeds and sore throat  Eyes: Negative for pain, redness and visual disturbance  Respiratory: Negative for cough, shortness of breath and wheezing  Cardiovascular: Negative for chest pain, palpitations and leg swelling  Gastrointestinal: Negative for abdominal pain, nausea and vomiting  Endocrine: Negative for polydipsia and polyuria  Genitourinary: Negative for dysuria and hematuria  Musculoskeletal: Positive for arthralgias  Skin: Negative for rash and wound  Neurological: Negative for dizziness, light-headedness and headaches  Psychiatric/Behavioral: Negative for decreased concentration, dysphoric mood and suicidal ideas  The patient is not nervous/anxious          Past Medical History:   Diagnosis Date   • Acute venous embolism and thrombosis of deep vessels of proximal lower extremity (HCC)     Last assessed: 5/18/15   • Arthritis    • Cellulitis     LE   • Chronic kidney disease 3/2020    Acute Kidney Injury   • CPAP (continuous positive airway pressure) dependence    • Factor V Leiden ([address] )    • Forgetfulness    • Gross hematuria 5/17/2022   • Headache(784 0) 3/2022    Never till cervical  spine surgery   • Heart murmur 3/2020    Told when in hospital   • [facility] (hard of hearing)    • Hypoalbuminemia 5/11/2022   • Other acute osteomyelitis, other site ([address] ) 1/3/2023   • Paroxysmal atrial fibrillation ([address] )     Last assessed: 11/2/15   • Sleep apnea        Past Surgical History:   Procedure Laterality Date   • BACK SURGERY      Lower   • CARDIAC CATHETERIZATION N/A 04/09/2023    Procedure: Cardiac pci;  Surgeon: Thereasa Lombard, MD;  Location: [facility]; Service: Cardiology   • CARDIAC CATHETERIZATION N/A 04/09/2023    Procedure: Cardiac Coronary Angiogram;  Surgeon: Thereasa Lombard, MD;  Location: [facility]; Service: Cardiology   • CATARACT EXTRACTION     • COLON SURGERY     • COLONOSCOPY     • INCISION AND DRAINAGE POSTERIOR SPINE N/A 04/01/2022    Procedure: Posterior cervical evacuation of postoperative collection and debridement with placement of drains C3-T1; Surgeon: Josie Shell MD;  Location: [facility];  Service: Neurosurgery   • IR BIOPSY KIDNEY RANDOM  05/26/2022   • IR TUNNELED DIALYSIS CATHETER PLACEMENT  05/23/2022   • IR TUNNELED DIALYSIS CATHETER REMOVAL  06/02/2022   • OR ARTHRD ANT INTERBODY  [address] [facility] BELOW C2 N/A 03/11/2022    Procedure: Anterior cervical discectomy and fixation fusion C5/6 and C6/7; Posterior cervical decompression and instrumented fusion C3-T1;   Surgeon: Josie Shell MD;  Location: [facility];  Service: Neurosurgery   • RENAL BIOPSY  6/2022   • SPINE SURGERY  03/11/2022    Cervical myelopathy/ cervical fusion       Family History   Problem Relation Age of Onset   • Lung cancer Mother    • Hearing loss Father    • Heart attack Brother    • Atrial fibrillation Brother    • Emphysema Sister        Social History     Occupational History   • Occupation: Retired   Tobacco Use   • Smoking status: Never   • Smokeless tobacco: Never   Vaping Use   • Vaping Use: Never used   Substance and Sexual Activity   • Alcohol use: Not Currently   • Drug use: No   • Sexual activity: Not Currently     Partners: Female         Current Outpatient Medications:   •  acetaminophen (TYLENOL) 500 mg tablet, Take 500 mg by mouth if needed for mild pain, Disp: , Rfl:   •  atorvastatin (LIPITOR) 80 mg tablet, Take 1 tablet (80 mg total) by mouth every evening, Disp: 90 tablet, Rfl: 3  •  clopidogrel (PLAVIX) 75 mg tablet, Take 1 tablet (75 mg total) by mouth daily, Disp: 90 tablet, Rfl: 3  •  DULoxetine (CYMBALTA) 60 mg delayed release capsule, Take 1 capsule (60 mg total) by mouth daily, Disp: 90 capsule, Rfl: 1  •  gabapentin (NEURONTIN) 300 mg capsule, TAKE 1 CAPSULE BY MOUTH  DAILY AT BEDTIME, Disp: 90 capsule, Rfl: 1  •  metoprolol succinate (TOPROL-XL) 25 mg 24 hr tablet, TAKE 1 TABLET BY MOUTH TWICE A DAY, Disp: 180 tablet, Rfl: 1  •  nitroglycerin (NITROSTAT) 0 4 mg SL tablet, Place 1 tablet (0 4 mg total) under the tongue every 5 (five) minutes as needed for chest pain, Disp: 30 tablet, Rfl: 0  •  potassium chloride (MICRO-K) 10 MEQ CR capsule, TAKE 1 CAPSULE BY MOUTH 2 TIMES A DAY , Disp: 180 capsule, Rfl: 1  •  tamsulosin (FLOMAX) 0 4 mg, TAKE 1 CAPSULE BY MOUTH  DAILY WITH DINNER, Disp: 90 capsule, Rfl: 1  •  torsemide (DEMADEX) 20 mg tablet, Take 1 tablet (20 mg total) by mouth daily, Disp: 90 tablet, Rfl: 3  •  warfarin (COUMADIN) 5 mg tablet, TAKE 2 5MG (1/2 TAB) BY  MOUTH DAILY EXCEPT  WED   AND SAT TAKE 5MG ( 1 TAB) DAILY (Patient taking differently: TAKE 2 5MG (1/2 TAB) BY  MOUTH Tuesday and Thursday TAKE 5MG ( 1 TAB) rest of the week), Disp: 78 tablet, Rfl: 0  •  zolpidem (AMBIEN) 10 mg tablet, Take 1 tablet (10 mg total) by mouth daily at bedtime as needed for sleep, Disp: 90 tablet, Rfl: 0  •  mometasone (ELOCON) 0 1 % cream, APPLY TO AFFECTED AREA TOPICALLY EVERY DAY (Patient not taking: Reported on 5/30/2023), Disp: 45 g, Rfl: 0    Allergies   Allergen Reactions   • Morphine GI Intolerance   • Vitamin K Other (See Comments)     On coumadin-patient sensitive to vitamin K amounts in meds etc             Vitals:    05/30/23 1323   BP: 137/81   Pulse: 60       Objective:                     Right Knee Exam     Tenderness   The patient is experiencing tenderness in the lateral joint line, medial joint line and "medial retinaculum  Range of Motion   Extension: 0   Flexion: 120     Tests   Varus: negative Valgus: negative  Drawer:  Anterior - negative        Other   Erythema: absent  Sensation: normal  Pulse: present  Swelling: none  Effusion: no effusion present    Comments:  Calf is soft and non tender      Left Knee Exam     Tenderness   The patient is experiencing tenderness in the medial joint line  Range of Motion   Extension: 0   Flexion: 110     Tests   Varus: negative Valgus: negative  Drawer:  Anterior - negative         Other   Erythema: absent  Sensation: normal  Pulse: present  Swelling: mild  Effusion: effusion present    Comments:  Calf is soft and non tender            Diagnostics, reviewed and taken today if performed as documented: The attending physician has personally reviewed the pertinent films in PACS and interpretation is as follows:  Xray of the right knee 3 views: Severe lateral compartment osteoarthritis    Xrays of the left knee 3 views: Severe medial compartment osteoarthritis with subluxation    Procedures, if performed today:    Large joint arthrocentesis: R knee  Universal Protocol:  Consent: Verbal consent obtained  Risks and benefits: risks, benefits and alternatives were discussed  Consent given by: patient  Time out: Immediately prior to procedure a \"time out\" was called to verify the correct patient, procedure, equipment, support staff and site/side marked as required    Timeout called at: 5/30/2023 2:17 PM   Patient understanding: patient states understanding of the procedure being performed  Site marked: the operative site was marked  Patient identity confirmed: verbally with patient    Supporting Documentation  Indications: pain   Procedure Details  Location: knee - R knee  Preparation: Patient was prepped and draped in the usual sterile fashion  Needle size: 22 G  Ultrasound guidance: no  Approach: anterolateral  Medications administered: 2 mL methylPREDNISolone acetate 40 " "mg/mL; 1 mL bupivacaine 0 25 %; 30 mg ketorolac 30 mg/mL    Patient tolerance: patient tolerated the procedure well with no immediate complications  Dressing:  Sterile dressing applied    Large joint arthrocentesis: L knee  Universal Protocol:  Consent: Verbal consent obtained  Risks and benefits: risks, benefits and alternatives were discussed  Consent given by: patient  Time out: Immediately prior to procedure a \"time out\" was called to verify the correct patient, procedure, equipment, support staff and site/side marked as required  Timeout called at: 5/30/2023 2:19 PM   Patient understanding: patient states understanding of the procedure being performed  Site marked: the operative site was marked  Patient identity confirmed: verbally with patient    Supporting Documentation  Indications: pain   Procedure Details  Location: knee - L knee  Preparation: Patient was prepped and draped in the usual sterile fashion  Needle size: 22 G  Ultrasound guidance: no  Approach: anterolateral  Medications administered: 2 mL methylPREDNISolone acetate 40 mg/mL; 1 mL bupivacaine 0 25 %; 30 mg ketorolac 30 mg/mL    Patient tolerance: patient tolerated the procedure well with no immediate complications  Dressing:  Sterile dressing applied            Portions of the record may have been created with voice recognition software  Occasional wrong word or \"sound a like\" substitutions may have occurred due to the inherent limitations of voice recognition software  Read the chart carefully and recognize, using context, where substitutions have occurred    "

## 2023-05-30 NOTE — PATIENT INSTRUCTIONS
Plan:  New xrays of the bilateral knees were obtained today and reviewed with the patient noting that the right knee has most of his OA on the alteral compartment where the left knee has most of the OA in the medial compartment; windswept knees  On exam, he has functional but painful ROM of the knees  Due to patient needing to wait at least 6 months until he can come off the Plavix due to his recent heart attack, 4/9/2023  Bilateral cortisone injections were provided to the patient at today's visit     The right knee will be aspirated and will be sent out via Synovasure at his follow up in 3 months  We will sign consents for right knee arthroplasty at the next visit in 3 months

## 2023-06-01 ENCOUNTER — ANTICOAG VISIT (OUTPATIENT)
Dept: FAMILY MEDICINE CLINIC | Facility: HOSPITAL | Age: 73
End: 2023-06-01

## 2023-06-01 ENCOUNTER — CLINICAL SUPPORT (OUTPATIENT)
Dept: CARDIAC REHAB | Facility: HOSPITAL | Age: 73
End: 2023-06-01

## 2023-06-01 DIAGNOSIS — Z95.5 S/P CORONARY ARTERY STENT PLACEMENT: Primary | ICD-10-CM

## 2023-06-06 ENCOUNTER — ANTICOAG VISIT (OUTPATIENT)
Dept: FAMILY MEDICINE CLINIC | Facility: HOSPITAL | Age: 73
End: 2023-06-06

## 2023-06-06 ENCOUNTER — CLINICAL SUPPORT (OUTPATIENT)
Dept: CARDIAC REHAB | Facility: HOSPITAL | Age: 73
End: 2023-06-06
Payer: COMMERCIAL

## 2023-06-06 DIAGNOSIS — Z95.5 S/P CORONARY ARTERY STENT PLACEMENT: Primary | ICD-10-CM

## 2023-06-06 LAB
INR PPP: 1.6 (ref 0.9–1.2)
PROTHROMBIN TIME: 15.7 SEC (ref 9.1–12)

## 2023-06-06 PROCEDURE — 93798 PHYS/QHP OP CAR RHAB W/ECG: CPT

## 2023-06-08 ENCOUNTER — CLINICAL SUPPORT (OUTPATIENT)
Dept: CARDIAC REHAB | Facility: HOSPITAL | Age: 73
End: 2023-06-08
Payer: COMMERCIAL

## 2023-06-08 DIAGNOSIS — Z95.5 S/P CORONARY ARTERY STENT PLACEMENT: Primary | ICD-10-CM

## 2023-06-08 PROCEDURE — 93798 PHYS/QHP OP CAR RHAB W/ECG: CPT

## 2023-06-09 ENCOUNTER — HOSPITAL ENCOUNTER (OUTPATIENT)
Dept: NON INVASIVE DIAGNOSTICS | Facility: HOSPITAL | Age: 73
Discharge: HOME | End: 2023-06-09
Attending: INTERNAL MEDICINE
Payer: COMMERCIAL

## 2023-06-09 VITALS
BODY MASS INDEX: 34.16 KG/M2 | HEIGHT: 71 IN | SYSTOLIC BLOOD PRESSURE: 137 MMHG | DIASTOLIC BLOOD PRESSURE: 81 MMHG | HEART RATE: 53 BPM | WEIGHT: 244 LBS

## 2023-06-09 DIAGNOSIS — I25.5 ISCHEMIC CARDIOMYOPATHY: ICD-10-CM

## 2023-06-09 DIAGNOSIS — I50.22 CHRONIC SYSTOLIC HEART FAILURE (HCC): ICD-10-CM

## 2023-06-09 LAB
AORTIC VALVE MEAN VELOCITY: 19.5 M/S
APICAL FOUR CHAMBER EJECTION FRACTION: 61 %
AV AREA BY CONTINUOUS VTI: 1.3 CM2
AV AREA PEAK VELOCITY: 1.2 CM2
AV LVOT MEAN GRADIENT: 2 MMHG
AV LVOT PEAK GRADIENT: 4 MMHG
AV MEAN GRADIENT: 17 MMHG
AV PEAK GRADIENT: 31 MMHG
AV VALVE AREA: 1.28 CM2
DOP CALC AO VTI: 63.6 CM
DOP CALC LVOT AREA: 3.46
DOP CALC LVOT DIAMETER: 2.1 CM
DOP CALC LVOT PEAK VEL VTI: 23.6 CM
DOP CALC LVOT PEAK VEL: 0.95 M/S
DOP CALC LVOT STROKE INDEX: 35.8 ML/M2
DOP CALC LVOT STROKE VOLUME: 81.7
LEFT VENTRICLE DIASTOLIC VOLUME (MOD BIPLANE): 130 ML
LEFT VENTRICLE SYSTOLIC VOLUME (MOD BIPLANE): 63 ML
LV EF: 52 %
SL CV LV EF: 60

## 2023-06-09 PROCEDURE — 93308 TTE F-UP OR LMTD: CPT

## 2023-06-09 PROCEDURE — 93321 DOPPLER ECHO F-UP/LMTD STD: CPT | Performed by: INTERNAL MEDICINE

## 2023-06-09 PROCEDURE — 93325 DOPPLER ECHO COLOR FLOW MAPG: CPT | Performed by: INTERNAL MEDICINE

## 2023-06-09 PROCEDURE — 93308 TTE F-UP OR LMTD: CPT | Performed by: INTERNAL MEDICINE

## 2023-06-09 RX ADMIN — PERFLUTREN 1.6 ML/MIN: 6.52 INJECTION, SUSPENSION INTRAVENOUS at 15:08

## 2023-06-09 NOTE — RESULT ENCOUNTER NOTE
Tried calling patient and wife  Please try  EF has returned to normal  Very good news  OK to stop LifeVest  No need for implantable debrillation

## 2023-06-12 ENCOUNTER — TELEPHONE (OUTPATIENT)
Dept: CARDIOLOGY CLINIC | Facility: CLINIC | Age: 73
End: 2023-06-12

## 2023-06-12 NOTE — TELEPHONE ENCOUNTER
----- Message from Radha Gilliam MD sent at 6/9/2023  4:53 PM EDT -----  Tried calling patient and wife  Please try  EF has returned to normal  Very good news  OK to stop LifeVest  No need for implantable debrillation

## 2023-06-13 ENCOUNTER — CLINICAL SUPPORT (OUTPATIENT)
Dept: CARDIAC REHAB | Facility: HOSPITAL | Age: 73
End: 2023-06-13
Payer: COMMERCIAL

## 2023-06-13 ENCOUNTER — ANTICOAG VISIT (OUTPATIENT)
Dept: FAMILY MEDICINE CLINIC | Facility: HOSPITAL | Age: 73
End: 2023-06-13

## 2023-06-13 DIAGNOSIS — Z95.5 S/P CORONARY ARTERY STENT PLACEMENT: Primary | ICD-10-CM

## 2023-06-13 PROCEDURE — 93798 PHYS/QHP OP CAR RHAB W/ECG: CPT

## 2023-06-13 NOTE — ASSESSMENT & PLAN NOTE
Patient admitted with acute kidney injury felt to be most likely due to post infectious  glomerulonephritis        Likely plan is for renal biopsy on Monday, continue to hold Coumadin, no lab work ordered for Saturday morning as it will not  Azathioprine Counseling:  I discussed with the patient the risks of azathioprine including but not limited to myelosuppression, immunosuppression, hepatotoxicity, lymphoma, and infections.  The patient understands that monitoring is required including baseline LFTs, Creatinine, possible TPMP genotyping and weekly CBCs for the first month and then every 2 weeks thereafter.  The patient verbalized understanding of the proper use and possible adverse effects of azathioprine.  All of the patient's questions and concerns were addressed.

## 2023-06-14 ENCOUNTER — TELEPHONE (OUTPATIENT)
Dept: CARDIOLOGY CLINIC | Facility: CLINIC | Age: 73
End: 2023-06-14

## 2023-06-14 NOTE — TELEPHONE ENCOUNTER
Patient called and is inquiring if he still needs to keep his 6/15/2023 appointment tomorrow w/Dr Lock    Patient's Echo results from 6/9/2023 were normal and he was advised he didn't need to wear Life Vest   Please Advise (tamra)

## 2023-06-15 ENCOUNTER — CLINICAL SUPPORT (OUTPATIENT)
Dept: CARDIAC REHAB | Facility: HOSPITAL | Age: 73
End: 2023-06-15
Payer: COMMERCIAL

## 2023-06-15 DIAGNOSIS — Z95.5 S/P CORONARY ARTERY STENT PLACEMENT: Primary | ICD-10-CM

## 2023-06-15 PROCEDURE — 93798 PHYS/QHP OP CAR RHAB W/ECG: CPT

## 2023-06-16 ENCOUNTER — OFFICE VISIT (OUTPATIENT)
Dept: FAMILY MEDICINE CLINIC | Facility: HOSPITAL | Age: 73
End: 2023-06-16
Payer: COMMERCIAL

## 2023-06-16 VITALS
OXYGEN SATURATION: 98 % | SYSTOLIC BLOOD PRESSURE: 138 MMHG | HEART RATE: 58 BPM | DIASTOLIC BLOOD PRESSURE: 80 MMHG | BODY MASS INDEX: 32.48 KG/M2 | HEIGHT: 71 IN | WEIGHT: 232 LBS

## 2023-06-16 DIAGNOSIS — I25.10 CORONARY ARTERY DISEASE INVOLVING NATIVE CORONARY ARTERY OF NATIVE HEART WITHOUT ANGINA PECTORIS: Primary | Chronic | ICD-10-CM

## 2023-06-16 DIAGNOSIS — Z12.11 SCREEN FOR COLON CANCER: ICD-10-CM

## 2023-06-16 DIAGNOSIS — D63.1 ANEMIA DUE TO STAGE 4 CHRONIC KIDNEY DISEASE (HCC): ICD-10-CM

## 2023-06-16 DIAGNOSIS — I35.0 MODERATE AORTIC STENOSIS: ICD-10-CM

## 2023-06-16 DIAGNOSIS — I10 ESSENTIAL HYPERTENSION: ICD-10-CM

## 2023-06-16 DIAGNOSIS — N18.4 ANEMIA DUE TO STAGE 4 CHRONIC KIDNEY DISEASE (HCC): ICD-10-CM

## 2023-06-16 DIAGNOSIS — D68.51 FACTOR V LEIDEN MUTATION (HCC): ICD-10-CM

## 2023-06-16 DIAGNOSIS — R73.01 IFG (IMPAIRED FASTING GLUCOSE): ICD-10-CM

## 2023-06-16 PROCEDURE — 99215 OFFICE O/P EST HI 40 MIN: CPT | Performed by: INTERNAL MEDICINE

## 2023-06-16 NOTE — PROGRESS NOTES
Assessment/Plan:     Diagnosis ICD-10-CM Associated Orders   1  Coronary artery disease involving native coronary artery of native heart without angina pectoris  I25 10       2  Factor V Leiden mutation ([facility] [address] )  D68 51       3  Moderate aortic stenosis  I35 0       4  Anemia due to stage 4 chronic kidney disease (HCC)  N18 4     D63 1           Problem List Items Addressed This Visit        Cardiovascular and Mediastinum    Coronary artery disease involving native coronary artery of native heart without angina pectoris - Primary (Chronic)     Had echo with improved ej fractuion now at 55%- is doing cardiac rehab  And feeling stornger overall   also has moderate aortic stenosis         Moderate aortic stenosis       Hematopoietic and Hemostatic    Factor V Leiden mutation ([facility] [address] )       Other    Anemia due to stage 4 chronic kidney disease (HCC)     Stopped epogen about 2 months ago- improving  Numbers   Sees Dr Darnell Allred               No follow-ups on file  Subjective:    Patient ID: Tom Rowland is a 68 y o  male    Here for follow up    reviewed echo with encouraging improvmenet in ejection- will be seen again in 3 months      The following portions of the patient's history were reviewed and updated as appropriate: allergies, current medications and problem list      Review of Systems   HENT: Negative for congestion  Respiratory: Negative for shortness of breath  Cardiovascular: Negative for chest pain and palpitations  Gastrointestinal: Negative for abdominal pain  Musculoskeletal:        Using walker today   All other systems reviewed and are negative          Objective:      Current Outpatient Medications:   •  acetaminophen (TYLENOL) 500 mg tablet, Take 500 mg by mouth if needed for mild pain, Disp: , Rfl:   •  atorvastatin (LIPITOR) 80 mg tablet, Take 1 tablet (80 mg total) by mouth every evening, Disp: 90 tablet, Rfl: 3  •  clopidogrel (PLAVIX) 75 mg tablet, Take 1 tablet (75 mg total) by mouth "daily, Disp: 90 tablet, Rfl: 3  •  DULoxetine (CYMBALTA) 60 mg delayed release capsule, Take 1 capsule (60 mg total) by mouth daily, Disp: 90 capsule, Rfl: 1  •  gabapentin (NEURONTIN) 300 mg capsule, TAKE 1 CAPSULE BY MOUTH  DAILY AT BEDTIME, Disp: 90 capsule, Rfl: 1  •  metoprolol succinate (TOPROL-XL) 25 mg 24 hr tablet, TAKE 1 TABLET BY MOUTH TWICE A DAY, Disp: 180 tablet, Rfl: 1  •  nitroglycerin (NITROSTAT) 0 4 mg SL tablet, Place 1 tablet (0 4 mg total) under the tongue every 5 (five) minutes as needed for chest pain, Disp: 30 tablet, Rfl: 0  •  potassium chloride (MICRO-K) 10 MEQ CR capsule, TAKE 1 CAPSULE BY MOUTH 2 TIMES A DAY , Disp: 180 capsule, Rfl: 1  •  tamsulosin (FLOMAX) 0 4 mg, TAKE 1 CAPSULE BY MOUTH  DAILY WITH DINNER, Disp: 90 capsule, Rfl: 1  •  torsemide (DEMADEX) 20 mg tablet, Take 1 tablet (20 mg total) by mouth daily, Disp: 90 tablet, Rfl: 3  •  warfarin (COUMADIN) 5 mg tablet, TAKE 2 5MG (1/2 TAB) BY  MOUTH DAILY EXCEPT  WED   AND SAT TAKE 5MG ( 1 TAB) DAILY (Patient taking differently: TAKE 2 5MG (1/2 TAB) BY  MOUTH Tuesday and Thursday TAKE 5MG ( 1 TAB) rest of the week), Disp: 78 tablet, Rfl: 0  •  zolpidem (AMBIEN) 10 mg tablet, Take 1 tablet (10 mg total) by mouth daily at bedtime as needed for sleep, Disp: 90 tablet, Rfl: 0    Blood pressure 138/80, pulse 58, height 5' 11\" (1 803 m), weight 105 kg (232 lb), SpO2 98 %  Physical Exam  Vitals and nursing note reviewed  Constitutional:       General: He is not in acute distress  Appearance: He is not toxic-appearing  HENT:      Head: Normocephalic  Right Ear: There is no impacted cerumen  Left Ear: There is no impacted cerumen  Nose: No congestion  Mouth/Throat:      Pharynx: No oropharyngeal exudate or posterior oropharyngeal erythema  Eyes:      General:         Right eye: No discharge  Left eye: No discharge  Cardiovascular:      Rate and Rhythm: Normal rate and regular rhythm        Heart " sounds: No murmur heard  Pulmonary:      Effort: No respiratory distress  Breath sounds: No wheezing or rhonchi  Abdominal:      Palpations: Abdomen is soft  Tenderness: There is no guarding or rebound  Musculoskeletal:         General: No swelling or tenderness  Skin:     Findings: No erythema  Neurological:      Mental Status: He is alert  Cranial Nerves: No cranial nerve deficit  Psychiatric:         Mood and Affect: Mood normal          Thought Content:  Thought content normal          Judgment: Judgment normal

## 2023-06-19 ENCOUNTER — TELEPHONE (OUTPATIENT)
Dept: FAMILY MEDICINE CLINIC | Facility: HOSPITAL | Age: 73
End: 2023-06-19

## 2023-06-19 NOTE — TELEPHONE ENCOUNTER
Form filled out for [employer] and to ef desk to sign then will fax  I gave message to pt, but he NOW DOESN'T want it  He wants to go to lab when he gets his other labs done  Form discarded         ----- Message from Abiola Aparicio DO sent at 6/16/2023  5:46 PM EDT -----  Could we look into having home pt monitor for this patient please

## 2023-06-20 ENCOUNTER — CLINICAL SUPPORT (OUTPATIENT)
Dept: CARDIAC REHAB | Facility: HOSPITAL | Age: 73
End: 2023-06-20
Payer: COMMERCIAL

## 2023-06-20 DIAGNOSIS — Z95.5 S/P CORONARY ARTERY STENT PLACEMENT: Primary | ICD-10-CM

## 2023-06-20 PROCEDURE — 93798 PHYS/QHP OP CAR RHAB W/ECG: CPT

## 2023-06-20 NOTE — PROGRESS NOTES
Cardiac Rehabilitation Plan of Care   60 Day Reassessment        Today's date: 2023   # of Exercise Sessions Completed: 14  Patient name: Richie Figueroa      : 1950  Age: 68 y o  MRN: [id number]  Referring Physician: Jessy Lawson, *  Cardiologist: Dr Monica Pierre   Provider: Clara Raymond  Clinician: Tg Kendall MS, CEP      Dx:   Encounter Diagnosis   Name Primary? • S/P coronary artery stent placement Yes     Date of onset: 2023      SUMMARY OF PROGRESS:  Mr Radha Be has started his cardiac rehabilitation program and has attended 14 sessions of his rehab program over the past 60 days  He is attending regularly 2x/week and is progressing his exercise times and intensities appropriately  He is currently completing 40-50 min of aerobic exercise and has increased to 3 4 METs  He shows normal hemodynamic response to exercise  Telemetry shows NSR  He has also started a light strength training program 2x/week  Will continue to progress exercise times and intensities as he tolerates over next 30 days of rehab  His goals for his program are to increase lower body strength with stamina with walking/balance, get back to hunting and fishing, and be able to help his wife get better/care taker  He feels he is on target with his goals at 60 days  He is increasing his strength and endurance with exercise  He is limited with exercise due to knee pain, but he pushes as much as he can to complete his rehab sessions  He is using NuStep, TM, and UBE  He will be starting a light strength training program 2x/week also  He has been participating in weekly education sessions on cardiac risk factor management  Depression screening using the PHQ-9 interprets the patient's score of 1/2  as  No depression, showing great improvement over past 30 days (12 initial score)  SERGE-7 screening tool for anxiety= 0 which is an improvement from score of 8 initially   When addressed, the patient admits to having depression/anxiety  He has been seeing a psychologist 1x/week  Patient reports excellent social/emotional support from family  Information to utilize [employer] was provided as well as contact information for counseling through [facility]  PHQ-9 score will be reassessed in 30 days due to an initial score revealing concern for depression  They rate stress 8/10 with the following stressors: wife currently declining health and his recent health concerns  This is contributing to his depression but he reports he tries to maintain a positive outlook each day  Stress management will be reviewed  The patient is a non-smoker  Patient admits to 100% medication compliance  They report the following physical limitations: lower body weakness/balance - using walker when outside of house       Medication compliance: Yes   Comments: Pt reports to be compliant with medications  Fall Risk: Moderate   Comments: Patient uses walking assist device (walker/cane/rollator) and Denies a fall in the past 6 months    EKG Interpretation: Rapid Afib       EXERCISE ASSESSMENT and PLAN    Exercise Prescription:      Frequency: 2 days/week   Supplement with home exercise 2+ days/wk as tolerated       Minutes: 40-50        METS: 2 5-3 5           HR: RHR+ 30 beats above, monitor HR closely    RPE: 4-6         Modalities: Treadmill, UBE and NuStep      30 Day Goals for Exercise Progression:    Frequency: 2 days/week of cardiac rehab       Supplement with home exercise 2+ days/wk as tolerated    Minutes: 45-60                              >150 mins/wk of moderate intensity exercise   METS: 2 6-3 4   HR: RHR+30 beats, monitor HR closely     RPE: 3-4   Modalities: Treadmill, UBE, [employer], NuStep and Recumbent bike    Strength trainin-3 days / week  12-15 repetitions  1-2 sets per modality   Will be added following 2-3 weeks of monitored exercise sessions   Modalities: Pull Downs, Lateral Raise, Arm Extension, Arm Curl, Sit to Stands, Upright Rows, Front Raises and Shoulder Shrugs    Home Exercise: none-have encouraged walking at home 2x/week on non-rehab days for 10-15 min as toelrated    Goals: 10% improvement in functional capacity - based on max METs achieved in fitness assessment, improved DASI score by 10%, Increase in exercise capacity by 40% - based on peak METs tolerated in cardiac rehab exercise session, Exercise 5 days/wk, >150mins/wk of moderate intensity exercise, Resume ADLs with increased strength, Attend Rehab regularly, Decrease sitting time and start a home exercise program    Progression Toward Goals:  Pt is progressing and showing improvement  toward the following goals:  increasing strength and endurance with exercise to be able to normal activities and care for his wife, increasing functional capacity shown by increased MET levels, attending rehab sessions 2x/week regularly  , Will continue to educate and progress as tolerated        Education: benefit of exercise for CAD risk factors, home exercise guidelines, AHA guidelines to achieve >150 mins/wk of moderate exercise, RPE scale, class: Risk Factors for Heart Disease and physical activity/exercise in extreme weather conditions   Plan:education on home exercise guidelines, home exercise 30+ mins 2 days opposite CR and Education class: Risk Factors for Heart Disease  Readiness to change: Preparation:  (Getting ready to change)       NUTRITION ASSESSMENT AND PLAN    Weight control:    Starting weight: 246 6 lbs    Current weight: 246 lb    Diabetes: N/A  A1c: 6 2    last measured: 4/9/2023    Lipid management: Discussed diet and lipid management and Last lipid profile 4/9/2023  Chol 229    HDL 45      Goals:reduced BMI to < 25, LDL <100, TRG <150, CHOL <200, decreased body fat% <25%   (M), fasting BG , Improved Rate Your Plate score  >68, 7 4-5%  wt loss, increase intake of fish, shellfish, increase intake of meatless meals, use low fat dairy, eat 3 or more servings of whole grains a day, choose low sodium processed foods, eliminate butter, Increase intake of nuts and seeds, daily saturated fat intake <7%/13g and Pt has made many changes since his event     Measurable goals were based Rate Your Plate Dietary Self-Assessment  These are the areas in which the patient could score higher on the assessment  Goals include recommendations for a heart healthy diet based on American Heart Association  Progression Toward Goals: Pt is progressing and showing improvement  toward the following goals:  doing well following healthy diet, has to follow diet to manage CKD and gets frustrated that he is battleing between what to eat with kidney disease and heart disease  Education on healthy eating and label reading  Education: heart healthy eating  low sodium diet  hydration  nutrition for  lipid management  healthy choices while dining out  portion control  education class: Heart Healthy Eating  education class:  Label Reading  Plan: Education class: Reading Food Labels, Education Class: Heart Healthy Eating, replace refined grain bread with whole grain bread, replace unhealthy snacks with fruits & vegs, switch to skim or 1% milk, avoid processed foods, remove salt shaker from table, use salt substitute like Mrs   Dash, increase utilization of fresh or dried herbs, will try new grains like brown rice, quinoa, farro, drink more water and learn how to read food labels  Readiness to change: Action:  (Changing behavior)      PSYCHOSOCIAL ASSESSMENT AND PLAN    Emotional:  Depression assessment:  PHQ-9 = 12  10-14 = Moderate Depression Medically managing with seeing a psychologist weekly   30 day PHQ-9=13 showing no improvement from intial PHQ-9  30 Day SERGE-7=0 showing improvement from initial SERGE-7            Anxiety measure:  SERGE-7 = 8  5-9 = Mild anxiety  Self-reported stress level:  8-caring for wife and worrying about her declining health  Social support: Very Good    Goals:  Reduce perceived stress to 1-3/10, improved [facility] QOL < 27, PHQ-9 - reduced severity by one level, continue medical therapy, Feelings in [facility] Score < 3, Physical Fitness in [facility] Score < 3, Overall Health in [facility] Score < 3, Quality of Life in [facility] Score < 3 , Change in Health in [place] Score < 3 , Increased interest in doing things, improved sleep, improved concentration, improved positive thoughts of well being, increased energy, stop worrying, take time to relax and Feel less anxious    Progression Toward Goals: Pt is progressing and showing improvement  toward the following goals:  continues to report feelings of depression, but anxiety has improved  He has stress related to his wife's declining health and having to be able to care for her  continues to see psychologist weekly  , Will continue to educate and progress as tolerated        Education: signs/sxs of depression, benefits of a positive support system, stress management techniques, depression and CAD, benefits of mental health counseling and class:  Stress and Your Health   Plan: Class: Stress and Your Health, PHQ-9 >5 will refer to MD, Practice relaxation techniques, Exercise, Spend time outdoors, Enjoy a hobby, Keep a positive mindset, Reduce dependence  on family, Enjoy family and Repeat PHQ-9 every 30 days if score >5  Readiness to change: Preparation:  (Getting ready to change)       OTHER CORE COMPONENTS     Tobacco:   Social History     Tobacco Use   Smoking Status Never   Smokeless Tobacco Never       Tobacco Use Intervention:   N/A:  Patient is a non-smoker     Anginal Symptoms:  SOB, nausea/vomiting and chest heaviness   NTG use: No prescription    Blood pressure:    Restin//72   Exercise: 138//72     Goals: consistent BP < 130/80, reduced dietary sodium <2300mg, moderate intensity exercise >150 mins/wk and medication compliance    Progression Toward Goals: Pt is progressing and showing improvement  toward the following goals:  BP is typically within normal resting limits, shows normal hemodynamic response to exercise, managing BP with increased exercise, medication, and low sodium intake   , Will continue to educate and progress as tolerated        Education:  understanding high blood pressure and it's relationship to CAD, low sodium diet and HTN, Education class:  Common Heart Medications and Education class: Understanding Heart Disease  Plan: Class: Understanding Heart Disease, Class: Common Heart Medications, avoid places with second hand smoke, Avoid Processed foods, engage in regular exercise, eliminate salt shaker at the table, use salt substitutes, check labels for sodium content and monitor home BP  Readiness to change: action

## 2023-06-22 ENCOUNTER — APPOINTMENT (OUTPATIENT)
Dept: CARDIAC REHAB | Facility: HOSPITAL | Age: 73
End: 2023-06-22
Payer: COMMERCIAL

## 2023-06-23 ENCOUNTER — CLINICAL SUPPORT (OUTPATIENT)
Dept: CARDIAC REHAB | Facility: HOSPITAL | Age: 73
End: 2023-06-23
Payer: COMMERCIAL

## 2023-06-23 DIAGNOSIS — Z95.5 S/P CORONARY ARTERY STENT PLACEMENT: Primary | ICD-10-CM

## 2023-06-23 PROCEDURE — 93798 PHYS/QHP OP CAR RHAB W/ECG: CPT

## 2023-06-24 DIAGNOSIS — G95.9 CERVICAL MYELOPATHY (HCC): ICD-10-CM

## 2023-06-24 RX ORDER — DULOXETIN HYDROCHLORIDE 60 MG/1
CAPSULE, DELAYED RELEASE ORAL
Qty: 90 CAPSULE | Refills: 1 | Status: SHIPPED | OUTPATIENT
Start: 2023-06-24

## 2023-06-27 ENCOUNTER — CLINICAL SUPPORT (OUTPATIENT)
Dept: CARDIAC REHAB | Facility: HOSPITAL | Age: 73
End: 2023-06-27
Payer: COMMERCIAL

## 2023-06-27 DIAGNOSIS — Z95.5 S/P CORONARY ARTERY STENT PLACEMENT: Primary | ICD-10-CM

## 2023-06-27 PROCEDURE — 93798 PHYS/QHP OP CAR RHAB W/ECG: CPT

## 2023-06-29 ENCOUNTER — APPOINTMENT (OUTPATIENT)
Dept: CARDIAC REHAB | Facility: HOSPITAL | Age: 73
End: 2023-06-29
Payer: COMMERCIAL

## 2023-06-30 ENCOUNTER — APPOINTMENT (OUTPATIENT)
Dept: CARDIAC REHAB | Facility: HOSPITAL | Age: 73
End: 2023-06-30
Payer: COMMERCIAL

## 2023-06-30 ENCOUNTER — ANTICOAG VISIT (OUTPATIENT)
Dept: FAMILY MEDICINE CLINIC | Facility: HOSPITAL | Age: 73
End: 2023-06-30

## 2023-06-30 LAB
ALBUMIN SERPL-MCNC: 4.3 G/DL (ref 3.7–4.7)
ALBUMIN/GLOB SERPL: 1.7 {RATIO} (ref 1.2–2.2)
ALP SERPL-CCNC: 81 IU/L (ref 44–121)
ALT SERPL-CCNC: 15 IU/L (ref 0–44)
AST SERPL-CCNC: 23 IU/L (ref 0–40)
BASOPHILS # BLD AUTO: 0 X10E3/UL (ref 0–0.2)
BASOPHILS NFR BLD AUTO: 1 %
BILIRUB SERPL-MCNC: 0.5 MG/DL (ref 0–1.2)
BUN SERPL-MCNC: 37 MG/DL (ref 8–27)
BUN/CREAT SERPL: 15 (ref 10–24)
CALCIUM SERPL-MCNC: 9.4 MG/DL (ref 8.6–10.2)
CHLORIDE SERPL-SCNC: 97 MMOL/L (ref 96–106)
CO2 SERPL-SCNC: 26 MMOL/L (ref 20–29)
CREAT SERPL-MCNC: 2.44 MG/DL (ref 0.76–1.27)
EGFR: 27 ML/MIN/1.73
EOSINOPHIL # BLD AUTO: 0.1 X10E3/UL (ref 0–0.4)
EOSINOPHIL NFR BLD AUTO: 1 %
ERYTHROCYTE [DISTWIDTH] IN BLOOD BY AUTOMATED COUNT: 13 % (ref 11.6–15.4)
GLOBULIN SER-MCNC: 2.6 G/DL (ref 1.5–4.5)
GLUCOSE SERPL-MCNC: 149 MG/DL (ref 70–99)
HCT VFR BLD AUTO: 31.6 % (ref 37.5–51)
HGB BLD-MCNC: 10.7 G/DL (ref 13–17.7)
IMM GRANULOCYTES # BLD: 0 X10E3/UL (ref 0–0.1)
IMM GRANULOCYTES NFR BLD: 0 %
LYMPHOCYTES # BLD AUTO: 1.4 X10E3/UL (ref 0.7–3.1)
LYMPHOCYTES NFR BLD AUTO: 23 %
MCH RBC QN AUTO: 29.8 PG (ref 26.6–33)
MCHC RBC AUTO-ENTMCNC: 33.9 G/DL (ref 31.5–35.7)
MCV RBC AUTO: 88 FL (ref 79–97)
MONOCYTES # BLD AUTO: 0.4 X10E3/UL (ref 0.1–0.9)
MONOCYTES NFR BLD AUTO: 7 %
NEUTROPHILS # BLD AUTO: 4.2 X10E3/UL (ref 1.4–7)
NEUTROPHILS NFR BLD AUTO: 68 %
PHOSPHATE SERPL-MCNC: 2.7 MG/DL (ref 2.8–4.1)
PLATELET # BLD AUTO: 215 X10E3/UL (ref 150–450)
POTASSIUM SERPL-SCNC: 4.3 MMOL/L (ref 3.5–5.2)
PROT SERPL-MCNC: 6.9 G/DL (ref 6–8.5)
RBC # BLD AUTO: 3.59 X10E6/UL (ref 4.14–5.8)
SODIUM SERPL-SCNC: 138 MMOL/L (ref 134–144)
WBC # BLD AUTO: 6.1 X10E3/UL (ref 3.4–10.8)

## 2023-07-05 LAB — COLOGUARD RESULT REPORTABLE: NEGATIVE

## 2023-07-06 ENCOUNTER — CLINICAL SUPPORT (OUTPATIENT)
Dept: CARDIAC REHAB | Facility: HOSPITAL | Age: 73
End: 2023-07-06
Payer: COMMERCIAL

## 2023-07-06 DIAGNOSIS — Z95.5 S/P CORONARY ARTERY STENT PLACEMENT: Primary | ICD-10-CM

## 2023-07-06 PROCEDURE — 93798 PHYS/QHP OP CAR RHAB W/ECG: CPT

## 2023-07-11 ENCOUNTER — CLINICAL SUPPORT (OUTPATIENT)
Dept: CARDIAC REHAB | Facility: HOSPITAL | Age: 73
End: 2023-07-11
Payer: COMMERCIAL

## 2023-07-11 DIAGNOSIS — Z95.5 S/P CORONARY ARTERY STENT PLACEMENT: Primary | ICD-10-CM

## 2023-07-11 PROCEDURE — 93798 PHYS/QHP OP CAR RHAB W/ECG: CPT

## 2023-07-11 NOTE — PROGRESS NOTES
Exercise session details     Just FYI - pt back in afib. See attached rhythm strips.    Pt took all medications and does not feel anything besides slight fatigue

## 2023-07-12 ENCOUNTER — OFFICE VISIT (OUTPATIENT)
Dept: NEPHROLOGY | Facility: CLINIC | Age: 73
End: 2023-07-12
Payer: COMMERCIAL

## 2023-07-12 VITALS
SYSTOLIC BLOOD PRESSURE: 126 MMHG | WEIGHT: 234 LBS | DIASTOLIC BLOOD PRESSURE: 76 MMHG | BODY MASS INDEX: 32.76 KG/M2 | OXYGEN SATURATION: 99 % | HEIGHT: 71 IN | HEART RATE: 53 BPM

## 2023-07-12 DIAGNOSIS — N18.4 ANEMIA DUE TO STAGE 4 CHRONIC KIDNEY DISEASE (HCC): ICD-10-CM

## 2023-07-12 DIAGNOSIS — I25.5 ISCHEMIC CARDIOMYOPATHY: ICD-10-CM

## 2023-07-12 DIAGNOSIS — I50.22 CHRONIC SYSTOLIC HEART FAILURE (HCC): ICD-10-CM

## 2023-07-12 DIAGNOSIS — N02.8 IGA NEPHROPATHY DETERMINED BY BIOPSY OF KIDNEY: Primary | ICD-10-CM

## 2023-07-12 DIAGNOSIS — D63.1 ANEMIA DUE TO STAGE 4 CHRONIC KIDNEY DISEASE (HCC): ICD-10-CM

## 2023-07-12 DIAGNOSIS — R80.8 OTHER PROTEINURIA: ICD-10-CM

## 2023-07-12 DIAGNOSIS — E83.39 HYPOPHOSPHATEMIA: ICD-10-CM

## 2023-07-12 PROCEDURE — 99214 OFFICE O/P EST MOD 30 MIN: CPT | Performed by: INTERNAL MEDICINE

## 2023-07-12 NOTE — PATIENT INSTRUCTIONS
Due to low hemoglobin  Nephrology is of eyes that he should proceed to the hospital emergency room for evaluation and possible transfusion  They will recheck his PT iron are after not have any Coumadin today  No signs of active bleeding  Blood pressure stable  Patient will follow-up post ER/hospital discharge  They would wish to review medications and see whether anything could be decreased  Follow-up with Nephrology as previously advised  [facility] counseling:  Patient advised to apply a pea-sized amount only at bedtime and wait 30 minutes after washing their face before applying.  If too drying, patient may add a non-comedogenic moisturizer.  The most commonly reported side effects including irritation, redness, scaling, dryness, stinging, burning, itching, and increased risk of sunburn.  The patient verbalized understanding of the proper use and possible adverse effects of retinoids.  All of the patient's questions and concerns were addressed.

## 2023-07-12 NOTE — PROGRESS NOTES
NEPHROLOGY OUTPATIENT PROGRESS NOTE   Murali Sorensen 68 y.o. male MRN: [id number]  DATE: 7/12/2023  Reason for visit:   Chief Complaint   Patient presents with   • Follow-up   • Chronic Kidney Disease        Patient Instructions   1. JANEL d/t IgAN, biopsy proven, in setting of recent MRSA infection  -renal biopsy performed May 26, 2022 was limited as only 6 intact glomeruli were present for evaluation on light microscopy. IgA dominant immune complex deposition noted by immunofluorescence. Differential includes IgA nephropathy both primary and secondary forms versus infectious associated glomerulonephritis. Staph aureus infections have been associated with IgA dominant immune complex deposition. Patient did have recent MRSA surgical site infection so infection associated GN should be considered.  -prednisone 60 mg daily begun June 2nd, 2022. S/p prednisone taper, completed 9/1/22  -monitor CMP, CBC next week  -if this was a primary IgA nephropathy, it would be compatible with Paris classification of M0 E1 S0 T1-C1.  -of 38 glomeruli, 6 were intact, forehead global glomerulosclerosis, segmental sclerosis was absent. Moderate interstitial fibrosis and tubular atrophy as well as arterial intimal fibrosis and mild arterolar hyalinosis were noted  -did not require dialysis inpatient, permacath removed prior to discharge  -sCr 2.28 as of 6/29/23, slowly improving from 5.34 and seems to have stabilized. 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 Thankfully, sCr stable and proteinuria continues to improve     2. Hyponatremia - resolved, last sNa 140, monitor BMP     3. Hypophosphatemia - phos 2.7, in setting of JANEL. Repeat phos. Hold phos binders. 4. Proteinuria - UpCr in setting of IgAN shows UpCr improved from 18.8g to 1.5g as of 8/15/22, with latest UpCr 150mg/g as of 6/29/23(improved)  -consider ACEi initiation if UpCr rises above 1g      5. HTN - BP acceptable for age/CKD in office. Continue metoprolol 25mg twice daily, flomax, torsemide 20mg daily  -no longer on Cardura 1 mg at bedtime, amlodipine 5 mg daily     6. Anemia ? D/t underlying GN/CKD - Hgb 9.5, received blood transfusion in the past, monitor CBC, previously receiving epogen infusions     7. LE edema likely d/t nephrotic syndrome as well as history of chronic diastolic CHF (grade 2 diastolic dysfunction noted on April 2022 echo)  - continue torsemide 20mg daily, will monitor K/SCr every 2 weeks. Monitor daily weight/BP readings. Repeat CMP, Ua, UpCr and microalb:Cr, to begin in Aug. 2023. Check CBC, phos in Sept. 2023. Continue torsemide 20mg daily for edema/nephrotic syndrome. RTC in 3 months. Casandra Ziegler was seen today for follow-up and chronic kidney disease.     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  -     Urinalysis with microscopic; Standing  -     Protein / creatinine ratio, urine; Standing  -     Comprehensive metabolic panel; Standing  -     Albumin / creatinine urine ratio; Standing  -     Urinalysis with microscopic  -     Protein / creatinine ratio, urine  -     Comprehensive metabolic panel  -     Albumin / creatinine urine ratio    Chronic systolic heart failure (HCC)    Ischemic cardiomyopathy    Anemia due to stage 4 chronic kidney disease (HCC)  -     CBC and differential; Future  -     CBC and differential    Other proteinuria  -     Protein / creatinine ratio, urine; Standing  -     Albumin / creatinine urine ratio; Standing  -     Protein / creatinine ratio, urine  - Albumin / creatinine urine ratio    Hypophosphatemia  -     Phosphorus; Future  -     Phosphorus        Assessment/Plan:  1. JANEL d/t IgAN, biopsy proven, in setting of recent MRSA infection  -renal biopsy performed May 26, 2022 was limited as only 6 intact glomeruli were present for evaluation on light microscopy.  IgA dominant immune complex deposition noted by immunofluorescence.  Differential includes IgA nephropathy both primary and secondary forms versus infectious associated glomerulonephritis.  Staph aureus infections have been associated with IgA dominant immune complex deposition.  Patient did have recent MRSA surgical site infection so infection associated GN should be considered.  -prednisone 60 mg daily begun June 2nd, 2022. S/p prednisone taper, completed 9/1/22  -monitor CMP, CBC next week  -if this was a primary IgA nephropathy, it would be compatible with Letcher classification of M0 E1 S0 T1-C1.  -of 38 glomeruli, 6 were intact, forehead global glomerulosclerosis, segmental sclerosis was absent.  Moderate interstitial fibrosis and tubular atrophy as well as arterial intimal fibrosis and mild arterolar hyalinosis were noted  -did not require dialysis inpatient, permacath removed prior to discharge  -sCr 2.28 as of 6/29/23, slowly improving from 5.34 and seems to have stabilized.   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 Thankfully, sCr stable and proteinuria continues to improve     2. Hyponatremia - resolved, last sNa 140, monitor BMP     3. Hypophosphatemia - phos 2.7, in setting of JANEL.  Repeat phos. Hold phos binders.     4. Proteinuria - UpCr in setting of IgAN shows UpCr improved from 18.8g to 1.5g as of 8/15/22, with latest UpCr 150mg/g as of 6/29/23(improved)  -consider ACEi initiation if UpCr rises above 1g      5. HTN - BP acceptable for age/CKD in office. Continue metoprolol 25mg twice daily, flomax, torsemide 20mg daily  -no longer on Cardura 1 mg at bedtime, amlodipine 5 mg daily     6. Anemia ? D/t underlying GN/CKD - Hgb 9.5, received blood transfusion in the past, monitor CBC, previously receiving epogen infusions     7. LE edema likely d/t nephrotic syndrome as well as history of chronic diastolic CHF (grade 2 diastolic dysfunction noted on April 2022 echo)  - continue torsemide 20mg daily, will monitor K/SCr every 2 weeks. Monitor daily weight/BP readings.     Repeat CMP, Ua, UpCr and microalb:Cr, to begin in Aug. 2023. Check CBC, phos in Sept. 2023. Continue torsemide 20mg daily for edema/nephrotic syndrome. RTC in 3 months.     SUBJECTIVE / INTERVAL HISTORY:  68 y.o. male presents in follow up of IgAN. Silviano Mejia denies any recent illness/hospitalizations/medication changes since last office visit. Denies NSAID use. Torsemide dose increased by cardiology. Denies leg edema. He has lost some weight and has more energy. Not currently receiving epogen infusions. Does feel lightheaded at times. Still getting cardiac rehab. Wife is doing worse with oxygen requirements. She is on 10L now. Has seen orthopedic surgeon in [place], [place]. Surgeon is considering knee replacement in Aug. 2023. Review of Systems   Constitutional: Negative for chills and fever. HENT: Negative for sore throat. Eyes: Negative for visual disturbance. Respiratory: Negative for cough and shortness of breath. Cardiovascular: Negative for chest pain and leg swelling. Gastrointestinal: Negative for abdominal pain, constipation, diarrhea, nausea and vomiting. Endocrine: Negative for polyuria. Genitourinary: Positive for urgency (dribbles if he doesn't make it to the bathroom). Negative for decreased urine volume, difficulty urinating, dysuria and hematuria. Musculoskeletal: Negative for back pain and myalgias. Knee pain   Skin: Negative for rash. Neurological: Positive for light-headedness (while in afib). Negative for dizziness and numbness. Psychiatric/Behavioral: Negative for confusion. OBJECTIVE:  /76 (BP Location: Left arm, Patient Position: Sitting, Cuff Size: Adult)   Pulse (!) 53   Ht 5' 11" (1.803 m)   Wt 106 kg (234 lb)   SpO2 99%   BMI 32.64 kg/m²  Body mass index is 32.64 kg/m². Physical exam:  Physical Exam  Vitals reviewed. Constitutional:       General: He is not in acute distress. Appearance: Normal appearance. He is well-developed. He is obese. He is not diaphoretic. HENT:      Head: Normocephalic and atraumatic. Nose: Nose normal.      Mouth/Throat:      Mouth: Mucous membranes are moist.      Pharynx: No oropharyngeal exudate. Eyes:      General: No scleral icterus. Right eye: No discharge. Left eye: No discharge. Neck:      Thyroid: No thyromegaly. Cardiovascular:      Rate and Rhythm: Normal rate and regular rhythm. Heart sounds: Normal heart sounds. Pulmonary:      Effort: Pulmonary effort is normal.      Breath sounds: Normal breath sounds. No wheezing or rales. Abdominal:      General: Bowel sounds are normal. There is no distension. Palpations: Abdomen is soft. Tenderness: There is no abdominal tenderness. Musculoskeletal:         General: No swelling. Normal range of motion. Cervical back: Neck supple. Lymphadenopathy:      Cervical: No cervical adenopathy. Skin:     General: Skin is warm and dry. Findings: No rash. Neurological:      General: No focal deficit present. Mental Status: He is alert.       Comments: awake   Psychiatric:         Mood and Affect: Mood normal. Behavior: Behavior normal.         Medications:    Current Outpatient Medications:   •  acetaminophen (TYLENOL) 500 mg tablet, Take 500 mg by mouth if needed for mild pain, Disp: , Rfl:   •  atorvastatin (LIPITOR) 80 mg tablet, Take 1 tablet (80 mg total) by mouth every evening, Disp: 90 tablet, Rfl: 3  •  clopidogrel (PLAVIX) 75 mg tablet, Take 1 tablet (75 mg total) by mouth daily, Disp: 90 tablet, Rfl: 3  •  DULoxetine (CYMBALTA) 60 mg delayed release capsule, TAKE 1 CAPSULE BY MOUTH EVERY DAY, Disp: 90 capsule, Rfl: 1  •  gabapentin (NEURONTIN) 300 mg capsule, TAKE 1 CAPSULE BY MOUTH  DAILY AT BEDTIME, Disp: 90 capsule, Rfl: 1  •  metoprolol succinate (TOPROL-XL) 25 mg 24 hr tablet, TAKE 1 TABLET BY MOUTH TWICE A DAY, Disp: 180 tablet, Rfl: 1  •  nitroglycerin (NITROSTAT) 0.4 mg SL tablet, Place 1 tablet (0.4 mg total) under the tongue every 5 (five) minutes as needed for chest pain, Disp: 30 tablet, Rfl: 0  •  potassium chloride (MICRO-K) 10 MEQ CR capsule, TAKE 1 CAPSULE BY MOUTH 2 TIMES A DAY., Disp: 180 capsule, Rfl: 1  •  tamsulosin (FLOMAX) 0.4 mg, TAKE 1 CAPSULE BY MOUTH  DAILY WITH DINNER, Disp: 90 capsule, Rfl: 1  •  torsemide (DEMADEX) 20 mg tablet, Take 1 tablet (20 mg total) by mouth daily, Disp: 90 tablet, Rfl: 3  •  warfarin (COUMADIN) 5 mg tablet, TAKE 2.5MG (1/2 TAB) BY  MOUTH DAILY EXCEPT  WED   AND SAT TAKE 5MG ( 1 TAB) DAILY (Patient taking differently: TAKE 2.5MG (1/2 TAB) BY  MOUTH Tuesday and Thursday TAKE 5MG ( 1 TAB) rest of the week), Disp: 78 tablet, Rfl: 0  •  zolpidem (AMBIEN) 10 mg tablet, Take 1 tablet (10 mg total) by mouth daily at bedtime as needed for sleep, Disp: 90 tablet, Rfl: 0    Allergies:   Allergies as of 07/12/2023 - Reviewed 07/12/2023   Allergen Reaction Noted   • Morphine GI Intolerance 09/11/2018   • Vitamin k Other (See Comments) 12/28/2020       The following portions of the patient's history were reviewed and updated as appropriate: past family history, past surgical history and problem list.    Laboratory Results:  Lab Results   Component Value Date    SODIUM 140 06/29/2023    K 4.3 06/29/2023    CL 96 06/29/2023    CO2 25 06/29/2023    BUN 38 (H) 06/29/2023    CREATININE 2.28 (H) 06/29/2023    GLUC 142 (H) 06/29/2023    CALCIUM 9.4 05/22/2023        Lab Results   Component Value Date    CALCIUM 9.4 05/22/2023    PHOS 3.6 05/22/2023       Portions of the record may have been created with voice recognition software.  Occasional wrong word or "sound a like" substitutions may have occurred due to the inherent limitations of voice recognition software.  Read the chart carefully and recognize, using context, where substitutions have occurred.

## 2023-07-12 NOTE — PATIENT INSTRUCTIONS
1. JANEL d/t IgAN, biopsy proven, in setting of recent MRSA infection  -renal biopsy performed May 26, 2022 was limited as only 6 intact glomeruli were present for evaluation on light microscopy. IgA dominant immune complex deposition noted by immunofluorescence. Differential includes IgA nephropathy both primary and secondary forms versus infectious associated glomerulonephritis. Staph aureus infections have been associated with IgA dominant immune complex deposition. Patient did have recent MRSA surgical site infection so infection associated GN should be considered.  -prednisone 60 mg daily begun June 2nd, 2022. S/p prednisone taper, completed 9/1/22  -monitor CMP, CBC next week  -if this was a primary IgA nephropathy, it would be compatible with Van classification of M0 E1 S0 T1-C1.  -of 38 glomeruli, 6 were intact, forehead global glomerulosclerosis, segmental sclerosis was absent. Moderate interstitial fibrosis and tubular atrophy as well as arterial intimal fibrosis and mild arterolar hyalinosis were noted  -did not require dialysis inpatient, permacath removed prior to discharge  -sCr 2.28 as of 6/29/23, slowly improving from 5.34 and seems to have stabilized. 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 Thankfully, sCr stable and proteinuria continues to improve     2. Hyponatremia - resolved, last sNa 140, monitor BMP     3. Hypophosphatemia - phos 2.7, in setting of JANEL. Repeat phos. Hold phos binders.      4. Proteinuria - UpCr in setting of IgAN shows UpCr improved from 18.8g to 1.5g as of 8/15/22, with latest UpCr 150mg/g as of 6/29/23(improved)  -consider ACEi initiation if UpCr rises above 1g      5. HTN - BP acceptable for age/CKD in office. Continue metoprolol 25mg twice daily, flomax, torsemide 20mg daily  -no longer on Cardura 1 mg at bedtime, amlodipine 5 mg daily     6. Anemia ? D/t underlying GN/CKD - Hgb 9.5, received blood transfusion in the past, monitor CBC, previously receiving epogen infusions     7. LE edema likely d/t nephrotic syndrome as well as history of chronic diastolic CHF (grade 2 diastolic dysfunction noted on April 2022 echo)  - continue torsemide 20mg daily, will monitor K/SCr every 2 weeks. Monitor daily weight/BP readings. Repeat CMP, Ua, UpCr and microalb:Cr, to begin in Aug. 2023. Check CBC, phos in Sept. 2023. Continue torsemide 20mg daily for edema/nephrotic syndrome. RTC in 3 months.

## 2023-07-13 ENCOUNTER — CLINICAL SUPPORT (OUTPATIENT)
Dept: CARDIAC REHAB | Facility: HOSPITAL | Age: 73
End: 2023-07-13
Payer: COMMERCIAL

## 2023-07-13 DIAGNOSIS — Z95.5 S/P CORONARY ARTERY STENT PLACEMENT: Primary | ICD-10-CM

## 2023-07-13 PROCEDURE — 93798 PHYS/QHP OP CAR RHAB W/ECG: CPT

## 2023-07-18 ENCOUNTER — APPOINTMENT (OUTPATIENT)
Dept: CARDIAC REHAB | Facility: HOSPITAL | Age: 73
End: 2023-07-18
Payer: COMMERCIAL

## 2023-07-18 NOTE — PROGRESS NOTES
Cardiac Rehabilitation Plan of Care   90 Day Reassessment        Today's date: 2023   # of Exercise Sessions Completed: 19  Patient name: Carina Lazo      : 1950  Age: 68 y.o. MRN: [id number]  Referring Physician: Jennifer Blue, *  Cardiologist: Dr. Chris Bolaños   Provider: [facility]  Clinician: Aiden Zuñiga MS, CEP       Dx:   Encounter Diagnosis   Name Primary? • S/P coronary artery stent placement Yes     Date of onset: 2023      SUMMARY OF PROGRESS:  Mr. Dove Crereji continues his cardiac rehabilitation program and has attended 19 sessions of his rehab program over the past 90 days and is on vacation this week. He is attending regularly 2x/week and is progressing his exercise times and intensities appropriately. He had a recent echo completed on 2023 where his EF improved to 55-60% and he was able to D/C his LifeVest!  He is currently completing 60-70 min of aerobic exercise reaching 3.4 METs on different modalities such as the TRM, Nustep, and UBE. He is also completing a strength training program 2x/week. Resting //88 with normal response to exercise 158/68. Telemetry shows NSR but recently has been in afib which the MD is made aware. Will continue to progress exercise times and intensities as he tolerates over next 30 days of rehab. They report the following physical limitations: lower body weakness/balance - using walker when outside of house. He is not completing a home exercise program with rehab. His goals for his program are to increase lower body strength with stamina with walking/balance, get back to hunting and fishing, and be able to help his wife get better/care taker. He feels he is on target with his goals at 90 days. He is increasing his strength and endurance with exercise. He is limited with exercise due to knee pain, but he pushes as much as he can to complete his rehab sessions especially on the TRM.   He has been given weekly education sessions on cardiac risk factor management. He does report ongoing fatigue throughout the day as well but no other symptoms. Depression screening using the PHQ-9 interprets the patient's score of 1/2  as  No depression, showing great improvement at 60 days (12 initial score). SERGE-7 screening tool for anxiety= 0 which is an improvement from score of 8 initially. When addressed, the patient admits to having depression/anxiety. He has been seeing a psychologist 1x/week. Patient reports excellent social/emotional support from family. PHQ-9 score will not  reassessed at 120 days due to recent scoring. They rate stress 8/10 with the following stressors: wife currently declining health and his recent health concerns. This is contributing to his depression but he reports he tries to maintain a positive outlook each day. Stress management will be reviewed. The patient is a non-smoker. Patient admits to 100% medication compliance. Will continue to educate, monitor, and progress as tolerated in the next 30 days.      Medication compliance: Yes   Comments: Pt reports to be compliant with medications  Fall Risk: Moderate   Comments: Patient uses walking assist device (walker/cane/rollator) and Denies a fall in the past 6 months    EKG Interpretation: Rapid Afib/NSR       EXERCISE ASSESSMENT and PLAN    Exercise Prescription:      Frequency: 2 days/week   Supplement with home exercise 2+ days/wk as tolerated       Minutes: 60-70       METS: 3.4           HR: RHR+ 30 beats above, monitor HR closely    RPE: 4-6         Modalities: Treadmill, UBE and NuStep      30 Day Goals for Exercise Progression:    Frequency: 2 days/week of cardiac rehab       Supplement with home exercise 2+ days/wk as tolerated    Minutes: 60-70                            >150 mins/wk of moderate intensity exercise   METS: 3.5-3.8   HR: RHR+30 beats, monitor HR closely     RPE: 3-4   Modalities: Treadmill, UBE, NuStep and Recumbent bike    Strength trainin-3 days / week  12-15 repetitions  1-2 sets per modality    Modalities: Pull Downs, Lateral Raise, Arm Extension, Arm Curl, Sit to Stands, Upright Rows, Front Raises and Shoulder Shrugs    Home Exercise: none-have encouraged walking at home 2x/week on non-rehab days for 10-15 min as toelrated    Goals: 10% improvement in functional capacity - based on max METs achieved in fitness assessment, improved DASI score by 10%, Increase in exercise capacity by 40% - based on peak METs tolerated in cardiac rehab exercise session, Exercise 5 days/wk, >150mins/wk of moderate intensity exercise, Resume ADLs with increased strength, Attend Rehab regularly, Decrease sitting time and start a home exercise program    Progression Toward Goals:  Pt is progressing and showing improvement  toward the following goals:  increasing strength and endurance with exercise to be able to normal activities and care for his wife, increasing functional capacity shown by increased MET levels, attending rehab sessions 2x/week regularly, and completing a strength training program.  , Pt has not made progress toward the following goals: no home exercise with rehab . , Patient will start to plan structured plan for home exercise for discharge  in the next 30 days, Will continue to educate and progress as tolerated.     Education: benefit of exercise for CAD risk factors, home exercise guidelines, AHA guidelines to achieve >150 mins/wk of moderate exercise, RPE scale, class: Risk Factors for Heart Disease and physical activity/exercise in extreme weather conditions   Plan:education on home exercise guidelines, home exercise 30+ mins 2 days opposite CR and Education class: Risk Factors for Heart Disease  Readiness to change: Action:  (Changing behavior)      NUTRITION ASSESSMENT AND PLAN    Weight control:    Starting weight: 246.6 lbs    Current weight: 233 lb    Diabetes: N/A  A1c: 6.2    last measured: 4/9/2023    Lipid management: Discussed diet and lipid management and Last lipid profile 4/9/2023  Chol 229    HDL 45      Goals:reduced BMI to < 25, LDL <100, TRG <150, CHOL <200, decreased body fat% <25%   (M), fasting BG , Improved Rate Your Plate score  >35, 1.7-0%  wt loss, increase intake of fish, shellfish, increase intake of meatless meals, use low fat dairy, eat 3 or more servings of whole grains a day, choose low sodium processed foods, eliminate butter, Increase intake of nuts and seeds, daily saturated fat intake <7%/13g and Pt has made many changes since his event     Measurable goals were based Rate Your Plate Dietary Self-Assessment. These are the areas in which the patient could score higher on the assessment. Goals include recommendations for a heart healthy diet based on American Heart Association. Progression Toward Goals: Pt is progressing and showing improvement  toward the following goals:  doing well following healthy diet, has to follow diet to manage CKD and gets frustrated that he is battleing between what to eat with kidney disease and heart disease. Education on healthy eating and label reading. Weight loss with the program.  , Will continue to educate and progress as tolerated. Education: heart healthy eating  low sodium diet  hydration  nutrition for  lipid management  healthy choices while dining out  portion control  education class: Heart Healthy Eating  education class:  Label Reading  Plan: Education class: Reading Food Labels, Education Class: Heart Healthy Eating, replace refined grain bread with whole grain bread, replace unhealthy snacks with fruits & vegs, switch to skim or 1% milk, avoid processed foods, remove salt shaker from table, use salt substitute like Mrs. Ruth, increase utilization of fresh or dried herbs, will try new grains like brown rice, quinoa, farro, drink more water and learn how to read food labels  Readiness to change: Action:  (Changing behavior)      PSYCHOSOCIAL ASSESSMENT AND PLAN    Emotional:  Depression assessment:  PHQ-9 = 12  10-14 = Moderate Depression Medically managing with seeing a psychologist weekly   60 day PHQ-9=1/2  showing  improvement from intial PHQ-9  60 Day SERGE-7=0 showing improvement from initial SERGE-7            Anxiety measure:  SERGE-7 = 8  5-9 = Mild anxiety  Self-reported stress level:  8-caring for wife and worrying about her declining health  Social support: Very Good    Goals:  Reduce perceived stress to 1-3/10, improved [facility] QOL < 27, PHQ-9 - reduced severity by one level, continue medical therapy, Feelings in [facility] Score < 3, Physical Fitness in [facility] Score < 3, Overall Health in [facility] Score < 3, Quality of Life in [facility] Score < 3 , Change in Health in [place] Score < 3 , Increased interest in doing things, improved sleep, improved concentration, improved positive thoughts of well being, increased energy, stop worrying, take time to relax and Feel less anxious    Progression Toward Goals: Pt is progressing and showing improvement  toward the following goals:  60 day improvement of PHQ-9 and SERGE-7, feeling better overall since getting LifeVest D/C, and good support system at home .  , Patient will continue to maintain emotional health  in the next 30 days, Will continue to educate and progress as tolerated.     Education: signs/sxs of depression, benefits of a positive support system, stress management techniques, depression and CAD, benefits of mental health counseling and class:  Stress and Your Health   Plan: Class: Stress and Your Health, PHQ-9 >5 will refer to MD, Practice relaxation techniques, Exercise, Spend time outdoors, Enjoy a hobby, Keep a positive mindset, Reduce dependence  on family, Enjoy family and Repeat PHQ-9 every 30 days if score >5  Readiness to change: Action:  (Changing behavior)      OTHER CORE COMPONENTS     Tobacco:   Social History     Tobacco Use   Smoking Status Never   Smokeless Tobacco Never       Tobacco Use Intervention:   N/A:  Patient is a non-smoker     Anginal Symptoms:  SOB, nausea/vomiting and chest heaviness   NTG use: No prescription    Blood pressure:    Restin//88   Exercise: 158/68    Goals: consistent BP < 130/80, reduced dietary sodium <2300mg, moderate intensity exercise >150 mins/wk and medication compliance    Progression Toward Goals: Pt is progressing and showing improvement  toward the following goals:  BP is typically within normal resting limits, shows normal hemodynamic response to exercise, managing BP with increased exercise, medication compliance, and low sodium intake.  , Will continue to educate and progress as tolerated.     Education:  understanding high blood pressure and it's relationship to CAD, low sodium diet and HTN, Education class:  Common Heart Medications and Education class: Understanding Heart Disease  Plan: Class: Understanding Heart Disease, Class: Common Heart Medications, avoid places with second hand smoke, Avoid Processed foods, engage in regular exercise, eliminate salt shaker at the table, use salt substitutes, check labels for sodium content and monitor home BP  Readiness to change: Action (changing behavior)

## 2023-07-20 ENCOUNTER — APPOINTMENT (OUTPATIENT)
Dept: CARDIAC REHAB | Facility: HOSPITAL | Age: 73
End: 2023-07-20
Payer: COMMERCIAL

## 2023-07-23 DIAGNOSIS — N40.1 BPH WITH OBSTRUCTION/LOWER URINARY TRACT SYMPTOMS: ICD-10-CM

## 2023-07-23 DIAGNOSIS — N13.8 BPH WITH OBSTRUCTION/LOWER URINARY TRACT SYMPTOMS: ICD-10-CM

## 2023-07-23 DIAGNOSIS — R51.9 INTRACTABLE HEADACHE, UNSPECIFIED CHRONICITY PATTERN, UNSPECIFIED HEADACHE TYPE: ICD-10-CM

## 2023-07-23 DIAGNOSIS — I48.0 PAROXYSMAL A-FIB (HCC): ICD-10-CM

## 2023-07-23 RX ORDER — GABAPENTIN 300 MG/1
CAPSULE ORAL
Qty: 90 CAPSULE | Refills: 1 | Status: SHIPPED | OUTPATIENT
Start: 2023-07-23

## 2023-07-23 RX ORDER — WARFARIN SODIUM 5 MG/1
TABLET ORAL
Qty: 58 TABLET | Refills: 1 | Status: SHIPPED | OUTPATIENT
Start: 2023-07-23

## 2023-07-23 RX ORDER — TAMSULOSIN HYDROCHLORIDE 0.4 MG/1
CAPSULE ORAL
Qty: 90 CAPSULE | Refills: 1 | Status: SHIPPED | OUTPATIENT
Start: 2023-07-23

## 2023-07-27 ENCOUNTER — ANTICOAG VISIT (OUTPATIENT)
Dept: FAMILY MEDICINE CLINIC | Facility: HOSPITAL | Age: 73
End: 2023-07-27

## 2023-07-27 ENCOUNTER — CLINICAL SUPPORT (OUTPATIENT)
Dept: CARDIAC REHAB | Facility: HOSPITAL | Age: 73
End: 2023-07-27
Payer: COMMERCIAL

## 2023-07-27 DIAGNOSIS — Z95.5 S/P CORONARY ARTERY STENT PLACEMENT: Primary | ICD-10-CM

## 2023-07-27 LAB
ALBUMIN SERPL-MCNC: 4.4 G/DL (ref 3.8–4.8)
ALBUMIN/CREAT UR: 85 MG/G CREAT (ref 0–29)
ALBUMIN/GLOB SERPL: 1.8 {RATIO} (ref 1.2–2.2)
ALP SERPL-CCNC: 88 IU/L (ref 44–121)
ALT SERPL-CCNC: 15 IU/L (ref 0–44)
APPEARANCE UR: CLEAR
AST SERPL-CCNC: 23 IU/L (ref 0–40)
BACTERIA URNS QL MICRO: NORMAL
BILIRUB SERPL-MCNC: 0.5 MG/DL (ref 0–1.2)
BILIRUB UR QL STRIP: NEGATIVE
BUN SERPL-MCNC: 44 MG/DL (ref 8–27)
BUN/CREAT SERPL: 18 (ref 10–24)
CALCIUM SERPL-MCNC: 9.7 MG/DL (ref 8.6–10.2)
CASTS URNS QL MICRO: NORMAL /LPF
CHLORIDE SERPL-SCNC: 99 MMOL/L (ref 96–106)
CO2 SERPL-SCNC: 24 MMOL/L (ref 20–29)
COLOR UR: YELLOW
CREAT SERPL-MCNC: 2.5 MG/DL (ref 0.76–1.27)
CREAT UR-MCNC: 26.1 MG/DL
EGFR: 27 ML/MIN/1.73
EPI CELLS #/AREA URNS HPF: NORMAL /HPF (ref 0–10)
GLOBULIN SER-MCNC: 2.5 G/DL (ref 1.5–4.5)
GLUCOSE SERPL-MCNC: 134 MG/DL (ref 70–99)
GLUCOSE UR QL: NEGATIVE
HGB UR QL STRIP: ABNORMAL
INR PPP: 2 (ref 0.9–1.2)
KETONES UR QL STRIP: NEGATIVE
LEUKOCYTE ESTERASE UR QL STRIP: NEGATIVE
MICRO URNS: ABNORMAL
MICROALBUMIN UR-MCNC: 22.3 UG/ML
NITRITE UR QL STRIP: NEGATIVE
PH UR STRIP: 5.5 [PH] (ref 5–7.5)
POTASSIUM SERPL-SCNC: 3.9 MMOL/L (ref 3.5–5.2)
PROT SERPL-MCNC: 6.9 G/DL (ref 6–8.5)
PROT UR QL STRIP: NEGATIVE
PROT UR-MCNC: 6.7 MG/DL
PROT/CREAT UR: 257 MG/G CREAT (ref 0–200)
PROTHROMBIN TIME: 19.9 SEC (ref 9.1–12)
RBC #/AREA URNS HPF: NORMAL /HPF (ref 0–2)
SODIUM SERPL-SCNC: 140 MMOL/L (ref 134–144)
SP GR UR: 1.01 (ref 1–1.03)
UROBILINOGEN UR STRIP-ACNC: 0.2 MG/DL (ref 0.2–1)
WBC #/AREA URNS HPF: NORMAL /HPF (ref 0–5)

## 2023-07-27 PROCEDURE — 93798 PHYS/QHP OP CAR RHAB W/ECG: CPT

## 2023-08-01 ENCOUNTER — CLINICAL SUPPORT (OUTPATIENT)
Dept: CARDIAC REHAB | Facility: HOSPITAL | Age: 73
End: 2023-08-01
Payer: COMMERCIAL

## 2023-08-01 DIAGNOSIS — Z95.5 S/P CORONARY ARTERY STENT PLACEMENT: Primary | ICD-10-CM

## 2023-08-01 PROCEDURE — 93798 PHYS/QHP OP CAR RHAB W/ECG: CPT

## 2023-08-03 ENCOUNTER — CLINICAL SUPPORT (OUTPATIENT)
Dept: CARDIAC REHAB | Facility: HOSPITAL | Age: 73
End: 2023-08-03
Payer: COMMERCIAL

## 2023-08-03 DIAGNOSIS — Z95.5 S/P CORONARY ARTERY STENT PLACEMENT: Primary | ICD-10-CM

## 2023-08-03 PROCEDURE — 93798 PHYS/QHP OP CAR RHAB W/ECG: CPT

## 2023-08-08 ENCOUNTER — CLINICAL SUPPORT (OUTPATIENT)
Dept: CARDIAC REHAB | Facility: HOSPITAL | Age: 73
End: 2023-08-08
Payer: COMMERCIAL

## 2023-08-08 ENCOUNTER — ANTICOAG VISIT (OUTPATIENT)
Dept: FAMILY MEDICINE CLINIC | Facility: HOSPITAL | Age: 73
End: 2023-08-08

## 2023-08-08 DIAGNOSIS — Z95.5 S/P CORONARY ARTERY STENT PLACEMENT: Primary | ICD-10-CM

## 2023-08-08 PROCEDURE — 93798 PHYS/QHP OP CAR RHAB W/ECG: CPT

## 2023-08-10 ENCOUNTER — CLINICAL SUPPORT (OUTPATIENT)
Dept: CARDIAC REHAB | Facility: HOSPITAL | Age: 73
End: 2023-08-10
Payer: COMMERCIAL

## 2023-08-10 DIAGNOSIS — Z95.5 S/P CORONARY ARTERY STENT PLACEMENT: Primary | ICD-10-CM

## 2023-08-10 PROCEDURE — 93798 PHYS/QHP OP CAR RHAB W/ECG: CPT

## 2023-08-15 ENCOUNTER — CLINICAL SUPPORT (OUTPATIENT)
Dept: CARDIAC REHAB | Facility: HOSPITAL | Age: 73
End: 2023-08-15
Payer: COMMERCIAL

## 2023-08-15 DIAGNOSIS — Z95.5 S/P CORONARY ARTERY STENT PLACEMENT: Primary | ICD-10-CM

## 2023-08-15 PROCEDURE — 93798 PHYS/QHP OP CAR RHAB W/ECG: CPT

## 2023-08-16 NOTE — PROGRESS NOTES
Cardiac Rehabilitation Plan of Care   120 Day Reassessment        Today's date: 2023   # of Exercise Sessions Completed: 25  Patient name: Guy Olguin      : 1950  Age: 68 y.o. MRN: [id number]  Referring Physician: Monica Jaimes, *  Cardiologist: Dr. Fabby Jones   Provider: Richard marroquin  Clinician: Kasandra Erazo MS, CEP       Dx:   Encounter Diagnosis   Name Primary? • S/P coronary artery stent placement Yes     Date of onset: 2023      SUMMARY OF PROGRESS:  Mr. Imani Boyce continues his cardiac rehabilitation program and has attended 25 sessions of his rehab program over the past 120 days. He is attending regularly 2x/week and is progressing his exercise times and intensities appropriately. He had a recent echo completed on 2023 where his EF improved to 55-60% and he was able to D/C his LifeVest. He has been doing very well in his rehab program from cardiac standpoint. He is also increasing his overall strength and balance as well. He is currently completing 60 min of aerobic exercise reaching 3.4 METs on different modalities such as the TRM, Nustep, and UBE. He is also completing a strength training program 2x/week. Resting //88 with normal response to exercise 158/68. Telemetry shows NSR, with a couple bouts of Afib as well. Will continue to progress exercise times and intensities as he tolerates over next 30 days of rehab. He reports the following physical limitations: lower body weakness/balance - using walker when outside of house. He is not completing a home exercise program with rehab. His goals for his program are to increase lower body strength with stamina with walking/balance, get back to hunting and fishing, and be able to help his wife get better/care taker. He feels he is on target with his goals at 120 days. He is increasing his strength and endurance with exercise.  He is limited with exercise due to knee pain, but he pushes as much as he can to complete his rehab sessions especially on the TRM. He is now able to walk with his cane rather than his walker which he is very happy about. He notices significant improvement in strength of his lets and balance. He feels he is more energized throughout the day and is not limited with his ADLs and yard work. He feels he will be able to get back to hunting and fishing if he is able to get knee replacement. He is to have follow up to discuss surgery soon. He has been given weekly education sessions on cardiac risk factor management. Depression screening using the PHQ-9 interprets the patient's score of 1/2  as  No depression, showing great improvement (12 initial score). SERGE-7 screening tool for anxiety= 0 which is an improvement from score of 8 initially. When addressed, the patient admits to having depression/anxiety. He has been seeing a psychologist 1x/week. He and his family are currently dealing with possibility of his wife having a lung transplant. Patient reports excellent social/emotional support from family. PHQ-9 score will not  reassessed at 120 days due to recent scoring. They rate stress 8/10 with the following stressors: wife currently declining health and his recent health concerns. This is contributing to his depression but he reports he tries to maintain a positive outlook each day. Stress management will be reviewed. The patient is a non-smoker. Patient admits to 100% medication compliance. Will continue to educate, monitor, and progress as tolerated in the next 30 days.      Medication compliance: Yes   Comments: Pt reports to be compliant with medications  Fall Risk: Moderate   Comments: Patient uses walking assist device (walker/cane/rollator) and Denies a fall in the past 6 months    EKG Interpretation: Rapid Afib/NSR       EXERCISE ASSESSMENT and PLAN    Exercise Prescription:      Frequency: 2 days/week   Supplement with home exercise 2+ days/wk as tolerated       Minutes: 60-70       METS: 3.4           HR: RHR+ 30 beats above, monitor HR closely    RPE: 4-6         Modalities: Treadmill, UBE and NuStep      30 Day Goals for Exercise Progression:    Frequency: 2 days/week of cardiac rehab       Supplement with home exercise 2+ days/wk as tolerated    Minutes: 60-70                            >150 mins/wk of moderate intensity exercise   METS: 3.5-3.8   HR: RHR+30 beats, monitor HR closely     RPE: 3-4   Modalities: Treadmill, UBE, NuStep and Recumbent bike    Strength trainin-3 days / week  12-15 repetitions  1-2 sets per modality    Modalities: Pull Downs, Lateral Raise, Arm Extension, Arm Curl, Sit to Stands, Upright Rows, Front Raises and Shoulder Shrugs    Home Exercise: none-have encouraged walking at home 2x/week on non-rehab days for 10-15 min as toelrated    Goals: 10% improvement in functional capacity - based on max METs achieved in fitness assessment, improved DASI score by 10%, Increase in exercise capacity by 40% - based on peak METs tolerated in cardiac rehab exercise session, Exercise 5 days/wk, >150mins/wk of moderate intensity exercise, Resume ADLs with increased strength, Attend Rehab regularly, Decrease sitting time and start a home exercise program    Progression Toward Goals:  Pt is progressing and showing improvement  toward the following goals:  increasing strength and endurance with exercise to be able to normal activities and care for his wife, increasing functional capacity shown by increased MET levels, attending rehab sessions 2x/week regularly, and completing a strength training program.  , Pt has not made progress toward the following goals: no home exercise with rehab . , Patient will start to plan structured plan for home exercise for discharge  in the next 30 days, Will continue to educate and progress as tolerated.     Education: benefit of exercise for CAD risk factors, home exercise guidelines, AHA guidelines to achieve >150 mins/wk of moderate exercise, RPE scale, class: Risk Factors for Heart Disease and physical activity/exercise in extreme weather conditions   Plan:education on home exercise guidelines, home exercise 30+ mins 2 days opposite CR and Education class: Risk Factors for Heart Disease  Readiness to change: Action:  (Changing behavior)      NUTRITION ASSESSMENT AND PLAN    Weight control:    Starting weight: 246.6 lbs    Current weight: 237 lb    Diabetes: N/A  A1c: 6.2    last measured: 4/9/2023    Lipid management: Discussed diet and lipid management and Last lipid profile 4/9/2023  Chol 229    HDL 45      Goals:reduced BMI to < 25, LDL <100, TRG <150, CHOL <200, decreased body fat% <25%   (M), fasting BG , Improved Rate Your Plate score  >82, 8.1-0%  wt loss, increase intake of fish, shellfish, increase intake of meatless meals, use low fat dairy, eat 3 or more servings of whole grains a day, choose low sodium processed foods, eliminate butter, Increase intake of nuts and seeds, daily saturated fat intake <7%/13g and Pt has made many changes since his event     Measurable goals were based Rate Your Plate Dietary Self-Assessment. These are the areas in which the patient could score higher on the assessment. Goals include recommendations for a heart healthy diet based on American Heart Association. Progression Toward Goals: Pt is progressing and showing improvement  toward the following goals:  doing well following healthy diet, has to follow diet to manage CKD and gets frustrated that he is battleing between what to eat with kidney disease and heart disease. Education on healthy eating and label reading. Weight loss with the program.  , Will continue to educate and progress as tolerated.       Education: heart healthy eating  low sodium diet  hydration  nutrition for  lipid management  healthy choices while dining out  portion control  education class: Heart Healthy Eating  education class:  Label Reading  Plan: Education class: Reading Food Labels, Education Class: Heart Healthy Eating, replace refined grain bread with whole grain bread, replace unhealthy snacks with fruits & vegs, switch to skim or 1% milk, avoid processed foods, remove salt shaker from table, use salt substitute like Mrs. Ruth, increase utilization of fresh or dried herbs, will try new grains like brown rice, quinoa, farro, drink more water and learn how to read food labels  Readiness to change: Action:  (Changing behavior)      PSYCHOSOCIAL ASSESSMENT AND PLAN    Emotional:  Depression assessment:  PHQ-9 = 12  10-14 = Moderate Depression Medically managing with seeing a psychologist weekly   60 day PHQ-9=1/2  showing  improvement from intial PHQ-9  60 Day SERGE-7=0 showing improvement from initial SERGE-7            Anxiety measure:  SERGE-7 = 8  5-9 = Mild anxiety  Self-reported stress level:  8-caring for wife and worrying about her declining health  Social support: Very Good    Goals:  Reduce perceived stress to 1-3/10, improved [facility] QOL < 27, PHQ-9 - reduced severity by one level, continue medical therapy, Feelings in [facility] Score < 3, Physical Fitness in [facility] Score < 3, Overall Health in [facility] Score < 3, Quality of Life in [facility] Score < 3 , Change in Health in [place] Score < 3 , Increased interest in doing things, improved sleep, improved concentration, improved positive thoughts of well being, increased energy, stop worrying, take time to relax and Feel less anxious    Progression Toward Goals: Pt is progressing and showing improvement  toward the following goals:  60 day improvement of PHQ-9 and SERGE-7, feeling better overall since getting LifeVest D/C, and good support system at home .  , Patient will continue to maintain emotional health  in the next 30 days, Will continue to educate and progress as tolerated.     Education: signs/sxs of depression, benefits of a positive support system, stress management techniques, depression and CAD, benefits of mental health counseling and class:  Stress and Your Health   Plan: Class: Stress and Your Health, PHQ-9 >5 will refer to MD, Practice relaxation techniques, Exercise, Spend time outdoors, Enjoy a hobby, Keep a positive mindset, Reduce dependence  on family, Enjoy family and Repeat PHQ-9 every 30 days if score >5  Readiness to change: Action:  (Changing behavior)      OTHER CORE COMPONENTS     Tobacco:   Social History     Tobacco Use   Smoking Status Never   Smokeless Tobacco Never       Tobacco Use Intervention:   N/A:  Patient is a non-smoker     Anginal Symptoms:  SOB, nausea/vomiting and chest heaviness   NTG use: No prescription    Blood pressure:    Restin//88   Exercise: 158/68    Goals: consistent BP < 130/80, reduced dietary sodium <2300mg, moderate intensity exercise >150 mins/wk and medication compliance    Progression Toward Goals: Pt is progressing and showing improvement  toward the following goals:  BP is typically within normal resting limits, shows normal hemodynamic response to exercise, managing BP with increased exercise, medication compliance, and low sodium intake.  , Will continue to educate and progress as tolerated.     Education:  understanding high blood pressure and it's relationship to CAD, low sodium diet and HTN, Education class:  Common Heart Medications and Education class: Understanding Heart Disease  Plan: Class: Understanding Heart Disease, Class: Common Heart Medications, avoid places with second hand smoke, Avoid Processed foods, engage in regular exercise, eliminate salt shaker at the table, use salt substitutes, check labels for sodium content and monitor home BP  Readiness to change: Action (changing behavior)

## 2023-08-17 ENCOUNTER — CLINICAL SUPPORT (OUTPATIENT)
Dept: CARDIAC REHAB | Facility: HOSPITAL | Age: 73
End: 2023-08-17
Payer: COMMERCIAL

## 2023-08-17 DIAGNOSIS — Z95.5 S/P CORONARY ARTERY STENT PLACEMENT: Primary | ICD-10-CM

## 2023-08-17 PROCEDURE — 93798 PHYS/QHP OP CAR RHAB W/ECG: CPT

## 2023-08-22 ENCOUNTER — ANTICOAG VISIT (OUTPATIENT)
Dept: FAMILY MEDICINE CLINIC | Facility: HOSPITAL | Age: 73
End: 2023-08-22

## 2023-08-22 ENCOUNTER — CLINICAL SUPPORT (OUTPATIENT)
Dept: CARDIAC REHAB | Facility: HOSPITAL | Age: 73
End: 2023-08-22
Payer: COMMERCIAL

## 2023-08-22 DIAGNOSIS — Z95.5 S/P CORONARY ARTERY STENT PLACEMENT: Primary | ICD-10-CM

## 2023-08-22 PROCEDURE — 93798 PHYS/QHP OP CAR RHAB W/ECG: CPT

## 2023-08-24 ENCOUNTER — CLINICAL SUPPORT (OUTPATIENT)
Dept: CARDIAC REHAB | Facility: HOSPITAL | Age: 73
End: 2023-08-24
Payer: COMMERCIAL

## 2023-08-24 DIAGNOSIS — Z95.5 S/P CORONARY ARTERY STENT PLACEMENT: Primary | ICD-10-CM

## 2023-08-24 PROCEDURE — 93798 PHYS/QHP OP CAR RHAB W/ECG: CPT

## 2023-08-29 ENCOUNTER — CLINICAL SUPPORT (OUTPATIENT)
Dept: CARDIAC REHAB | Facility: HOSPITAL | Age: 73
End: 2023-08-29
Payer: COMMERCIAL

## 2023-08-29 DIAGNOSIS — Z95.5 S/P CORONARY ARTERY STENT PLACEMENT: Primary | ICD-10-CM

## 2023-08-29 PROCEDURE — 93798 PHYS/QHP OP CAR RHAB W/ECG: CPT

## 2023-08-30 ENCOUNTER — OFFICE VISIT (OUTPATIENT)
Dept: OBGYN CLINIC | Facility: CLINIC | Age: 73
End: 2023-08-30
Payer: COMMERCIAL

## 2023-08-30 VITALS
HEART RATE: 54 BPM | BODY MASS INDEX: 32.84 KG/M2 | SYSTOLIC BLOOD PRESSURE: 142 MMHG | HEIGHT: 71 IN | DIASTOLIC BLOOD PRESSURE: 80 MMHG | WEIGHT: 234.6 LBS

## 2023-08-30 DIAGNOSIS — M17.11 PRIMARY OSTEOARTHRITIS OF RIGHT KNEE: ICD-10-CM

## 2023-08-30 DIAGNOSIS — M25.561 CHRONIC PAIN OF BOTH KNEES: ICD-10-CM

## 2023-08-30 DIAGNOSIS — M17.0 BILATERAL PRIMARY OSTEOARTHRITIS OF KNEE: Primary | ICD-10-CM

## 2023-08-30 DIAGNOSIS — Z01.818 PREOPERATIVE TESTING: ICD-10-CM

## 2023-08-30 DIAGNOSIS — M25.562 CHRONIC PAIN OF BOTH KNEES: ICD-10-CM

## 2023-08-30 DIAGNOSIS — G89.29 CHRONIC PAIN OF BOTH KNEES: ICD-10-CM

## 2023-08-30 PROCEDURE — 87070 CULTURE OTHR SPECIMN AEROBIC: CPT | Performed by: PHYSICIAN ASSISTANT

## 2023-08-30 PROCEDURE — 20610 DRAIN/INJ JOINT/BURSA W/O US: CPT | Performed by: ORTHOPAEDIC SURGERY

## 2023-08-30 PROCEDURE — 99214 OFFICE O/P EST MOD 30 MIN: CPT | Performed by: ORTHOPAEDIC SURGERY

## 2023-08-30 PROCEDURE — 87205 SMEAR GRAM STAIN: CPT | Performed by: PHYSICIAN ASSISTANT

## 2023-08-30 PROCEDURE — 87075 CULTR BACTERIA EXCEPT BLOOD: CPT | Performed by: ORTHOPAEDIC SURGERY

## 2023-08-30 RX ORDER — FOLIC ACID 1 MG/1
1 TABLET ORAL DAILY
Qty: 30 TABLET | Refills: 0 | Status: SHIPPED | OUTPATIENT
Start: 2023-08-30

## 2023-08-30 RX ORDER — ASCORBIC ACID 500 MG
500 TABLET ORAL 2 TIMES DAILY
Qty: 30 TABLET | Refills: 0 | Status: SHIPPED | OUTPATIENT
Start: 2023-08-30

## 2023-08-30 RX ORDER — MULTIVIT-MIN/IRON FUM/FOLIC AC 7.5 MG-4
1 TABLET ORAL DAILY
Qty: 30 TABLET | Refills: 0 | Status: SHIPPED | OUTPATIENT
Start: 2023-08-30

## 2023-08-30 RX ORDER — FERROUS SULFATE TAB EC 324 MG (65 MG FE EQUIVALENT) 324 (65 FE) MG
324 TABLET DELAYED RESPONSE ORAL
Qty: 60 TABLET | Refills: 0 | Status: SHIPPED | OUTPATIENT
Start: 2023-08-30

## 2023-08-30 RX ORDER — CHLORHEXIDINE GLUCONATE 0.12 MG/ML
15 RINSE ORAL ONCE
OUTPATIENT
Start: 2023-08-30 | End: 2023-08-30

## 2023-08-30 NOTE — PROGRESS NOTES
Assessment:  1. Bilateral primary osteoarthritis of knee  Large joint arthrocentesis      2. Chronic pain of both knees  Large joint arthrocentesis          Plan:    • An aspiration of the right knee was completed today and sent out for testing via [employer] due to history of MRSA. • Total knee arthroplasty was discussed at length with the patient. It was explained to the patient that it is more beneficial to have one knee replaced at a time. • Pros and cons of operative and nonoperative intervention were discussed with patient. Similar complications include but not limited to infection, bleeding, scarring, nerve injury, vascular injury, continued pain, decreased range of motion, DVT, PE, death, loss of limb, obtain results patient wished, fracture. Orders were understood and patient did consent for right total knee arthroplasty  • Patient will restart Coumadin postoperatively  • Patient will stay for 1 night and surgery will be done at [address] [place] [address] discussed with patient and patient's wife and both are in agreement with treatment plan. Thank you  • The patient completed an Opioid Agreement and Procedure Consent form today. The above stated was discussed in layman's terms and the patient expressed understanding. All questions were answered to the patient's satisfaction. Subjective:   Kreg Severe is a 68 y.o. male who presents with bilateral osteoarthritis of the knees. He reports his right knee is bothering him more than the left. He has previously had corticosteroid injections of the knees. His last injection was on 5/30/2023. He recalls he did not experience relief from the injections for about 2 weeks after the date of the injection. He has been working on lower extremity exercises when at physical therapy for cardiac rehabilitation. He is interested in discussion total knee arthroplasty today.       Review of systems negative unless otherwise specified in HPI    Past Medical History:   Diagnosis Date   • Acute venous embolism and thrombosis of deep vessels of proximal lower extremity (HCC)     Last assessed: 5/18/15   • Arthritis    • Cellulitis     LE   • Chronic kidney disease 3/2020    Acute Kidney Injury   • CPAP (continuous positive airway pressure) dependence    • Factor V Leiden ([address])    • Forgetfulness    • Gross hematuria 5/17/2022   • Headache(784.0) 3/2022    Never till cervical  spine surgery   • Heart murmur 3/2020    Told when in hospital   • [facility] (hard of hearing)    • Hypoalbuminemia 5/11/2022   • Other acute osteomyelitis, other site ([address]) 1/3/2023   • Paroxysmal atrial fibrillation ([address])     Last assessed: 11/2/15   • Sleep apnea        Past Surgical History:   Procedure Laterality Date   • BACK SURGERY      Lower   • CARDIAC CATHETERIZATION N/A 04/09/2023    Procedure: Cardiac pci;  Surgeon: Giovanny Ruiz MD;  Location: [facility]; Service: Cardiology   • CARDIAC CATHETERIZATION N/A 04/09/2023    Procedure: Cardiac Coronary Angiogram;  Surgeon: Giovanny Ruiz MD;  Location[facility]; Service: Cardiology   • CATARACT EXTRACTION     • COLON SURGERY     • COLONOSCOPY     • INCISION AND DRAINAGE POSTERIOR SPINE N/A 04/01/2022    Procedure: Posterior cervical evacuation of postoperative collection and debridement with placement of drains C3-T1; Surgeon: Doug Navarro MD;  Location: [facility];  Service: Neurosurgery   • IR BIOPSY KIDNEY RANDOM  05/26/2022   • IR TUNNELED DIALYSIS CATHETER PLACEMENT  05/23/2022   • IR TUNNELED DIALYSIS CATHETER REMOVAL  06/02/2022   • OH ARTHRD ANT INTERBODY MIN [address] CRV BELOW C2 N/A 03/11/2022    Procedure: Anterior cervical discectomy and fixation fusion C5/6 and C6/7; Posterior cervical decompression and instrumented fusion C3-T1;   Surgeon: Doug Navarro MD;  Location: [facility];  Service: Neurosurgery   • RENAL BIOPSY  6/2022   • SPINE SURGERY  03/11/2022    Cervical myelopathy/ cervical fusion       Family History   Problem Relation Age of Onset   • Lung cancer Mother    • Hearing loss Father    • Heart attack Brother    • Atrial fibrillation Brother    • Emphysema Sister        Social History     Occupational History   • Occupation: Retired   Tobacco Use   • Smoking status: Never   • Smokeless tobacco: Never   Vaping Use   • Vaping Use: Never used   Substance and Sexual Activity   • Alcohol use: Not Currently   • Drug use: No   • Sexual activity: Not Currently     Partners: Female         Current Outpatient Medications:   •  acetaminophen (TYLENOL) 500 mg tablet, Take 500 mg by mouth if needed for mild pain, Disp: , Rfl:   •  atorvastatin (LIPITOR) 80 mg tablet, Take 1 tablet (80 mg total) by mouth every evening, Disp: 90 tablet, Rfl: 3  •  clopidogrel (PLAVIX) 75 mg tablet, Take 1 tablet (75 mg total) by mouth daily, Disp: 90 tablet, Rfl: 3  •  DULoxetine (CYMBALTA) 60 mg delayed release capsule, TAKE 1 CAPSULE BY MOUTH EVERY DAY, Disp: 90 capsule, Rfl: 1  •  gabapentin (NEURONTIN) 300 mg capsule, TAKE 1 CAPSULE BY MOUTH DAILY AT BEDTIME, Disp: 90 capsule, Rfl: 1  •  metoprolol succinate (TOPROL-XL) 25 mg 24 hr tablet, TAKE 1 TABLET BY MOUTH TWICE A DAY, Disp: 180 tablet, Rfl: 1  •  nitroglycerin (NITROSTAT) 0.4 mg SL tablet, Place 1 tablet (0.4 mg total) under the tongue every 5 (five) minutes as needed for chest pain, Disp: 30 tablet, Rfl: 0  •  potassium chloride (MICRO-K) 10 MEQ CR capsule, TAKE 1 CAPSULE BY MOUTH 2 TIMES A DAY., Disp: 180 capsule, Rfl: 1  •  tamsulosin (FLOMAX) 0.4 mg, TAKE 1 CAPSULE BY MOUTH DAILY  WITH DINNER, Disp: 90 capsule, Rfl: 1  •  torsemide (DEMADEX) 20 mg tablet, Take 1 tablet (20 mg total) by mouth daily, Disp: 90 tablet, Rfl: 3  •  warfarin (COUMADIN) 5 mg tablet, TAKE ONE-HALF TABLET BY MOUTH  DAILY , EXCEPT TAKE 1 TABLET BY  MOUTH ON WEDNESDAY AND SATURDAY, Disp: 58 tablet, Rfl: 1  •  zolpidem (AMBIEN) 10 mg tablet, Take 1 tablet (10 mg total) by mouth daily at bedtime as needed for sleep, Disp: 90 tablet, Rfl: 0    Allergies   Allergen Reactions   • Morphine GI Intolerance   • Vitamin K Other (See Comments)     On coumadin-patient sensitive to vitamin K amounts in meds etc.            Vitals:    08/30/23 1402   BP: 142/80   Pulse: (!) 54       Objective:            Physical Exam  Physical Exam:      General Appearance:    Alert, cooperative, no distress, appears stated age   Head:    Normocephalic, without obvious abnormality, atraumatic   Eyes:    conjunctiva/corneas clear, both eyes         Nose:   Nares normal, septum midline, no drainage    Throat:   Lips normal; teeth and gums normal   Neck:    symmetrical, trachea midline, ;     thyroid:  no enlargement/   Back:     Symmetric, no curvature, ROM normal   Lungs:   No audible wheezing or labored breathing   Chest Wall:    No tenderness or deformity    Heart:    Regular rate and rhythm                         Pulses:   2+ and symmetric all extremities   Skin:   Skin color, texture, turgor normal, no rashes or lesions   Neurologic:   normal strength, sensation and reflexes     throughout                       Right Knee Exam     Tenderness   The patient is experiencing tenderness in the lateral joint line and medial joint line. Range of Motion   Extension: 15   Flexion: 120     Tests   Varus: negative Valgus: negative  Drawer:  Anterior - negative        Other   Erythema: absent  Sensation: normal  Pulse: present  Swelling: mild  Effusion: effusion present    Comments:  Calf is soft and non tender      Left Knee Exam     Tenderness   The patient is experiencing tenderness in the medial joint line.     Range of Motion   Extension: 0   Flexion: 110     Tests   Varus: negative Valgus: negative  Drawer:  Anterior - negative         Other   Erythema: absent  Sensation: normal  Pulse: present  Swelling: none    Comments:  Calf is soft and non tender        Neurovascularly Intact Distally     Diagnostics, reviewed and taken today if performed as documented:    None performed      Procedures, if performed today:    Large joint arthrocentesis: R knee  Universal Protocol:  Consent: Verbal consent obtained. Risks and benefits: risks, benefits and alternatives were discussed  Consent given by: patient  Time out: Immediately prior to procedure a "time out" was called to verify the correct patient, procedure, equipment, support staff and site/side marked as required. Patient understanding: patient states understanding of the procedure being performed  Site marked: the operative site was marked  Patient identity confirmed: verbally with patient    Supporting Documentation  Indications: joint swelling and diagnostic evaluation   Procedure Details  Location: knee - R knee  Needle size: 18 G  Approach: anterolateral    Aspirate amount: 1 mL  Aspirate: blood-tinged  Analysis: fluid sample sent for laboratory analysis    Patient tolerance: patient tolerated the procedure well with no immediate complications  Dressing:  Sterile dressing applied        Scribe Attestation    I,:  Monty Sampson am acting as a scribe while in the presence of the attending physician.:       I,:  Griselda Pinks, MD personally performed the services described in this documentation    as scribed in my presence.:           Portions of the record may have been created with voice recognition software. Occasional wrong word or "sound a like" substitutions may have occurred due to the inherent limitations of voice recognition software. Read the chart carefully and recognize, using context, where substitutions have occurred.

## 2023-08-30 NOTE — PATIENT INSTRUCTIONS
Mindful preparation to begin daily prior surgery  1. I know and accept that this procedure will bring greater quality of life to me. 2. I must remember the incredible power I possess within me to achieve anything I desire, including being "pain free". 3. I have found out what is keeping me from happiness and will remove it. That is why I am here today. 4. I will always come out of a hard situation stronger. 5. I can't wait to embrace the new me when I wake up later. 6. I am determined to remain calm through this  7. Anxiety has no home in my being. 8. I rise above my physical pain. 9. There are some things I can't change, and I'm ok with that. But this procedure isn't one of those things because my team and I have got this in the bag. 10. I am feeling strong and healthy today. 11. Even in this bed, I am making better decisions about what I eat and drink. 12. Because I am here, I am choosing to make my mental and physical health a priority. 13. I picture my body glowing, growing, and healing. 14. I am well, fit, healthy, and powerful.

## 2023-08-31 ENCOUNTER — TELEPHONE (OUTPATIENT)
Dept: OBGYN CLINIC | Facility: CLINIC | Age: 73
End: 2023-08-31

## 2023-08-31 ENCOUNTER — CLINICAL SUPPORT (OUTPATIENT)
Dept: CARDIAC REHAB | Facility: HOSPITAL | Age: 73
End: 2023-08-31
Payer: COMMERCIAL

## 2023-08-31 DIAGNOSIS — Z95.5 S/P CORONARY ARTERY STENT PLACEMENT: Primary | ICD-10-CM

## 2023-08-31 PROCEDURE — 93798 PHYS/QHP OP CAR RHAB W/ECG: CPT

## 2023-08-31 NOTE — TELEPHONE ENCOUNTER
S/w pt's wife to schedule surgery and all associated appts. Reviewed instructions and explained that I will mail more information as well as lab orders. She agreed and verbalized understanding.

## 2023-09-01 ENCOUNTER — TELEPHONE (OUTPATIENT)
Dept: ANESTHESIOLOGY | Facility: CLINIC | Age: 73
End: 2023-09-01

## 2023-09-02 LAB
BACTERIA SPEC ANAEROBE CULT: NO GROWTH
BACTERIA TISS AEROBE CULT: NO GROWTH
GRAM STN SPEC: NORMAL

## 2023-09-05 ENCOUNTER — APPOINTMENT (OUTPATIENT)
Dept: CARDIAC REHAB | Facility: HOSPITAL | Age: 73
End: 2023-09-05
Payer: COMMERCIAL

## 2023-09-07 ENCOUNTER — CLINICAL SUPPORT (OUTPATIENT)
Dept: CARDIAC REHAB | Facility: HOSPITAL | Age: 73
End: 2023-09-07
Payer: COMMERCIAL

## 2023-09-07 DIAGNOSIS — Z95.5 S/P CORONARY ARTERY STENT PLACEMENT: Primary | ICD-10-CM

## 2023-09-07 PROCEDURE — 93798 PHYS/QHP OP CAR RHAB W/ECG: CPT

## 2023-09-12 ENCOUNTER — CLINICAL SUPPORT (OUTPATIENT)
Dept: CARDIAC REHAB | Facility: HOSPITAL | Age: 73
End: 2023-09-12
Payer: COMMERCIAL

## 2023-09-12 DIAGNOSIS — Z95.5 S/P CORONARY ARTERY STENT PLACEMENT: Primary | ICD-10-CM

## 2023-09-12 PROCEDURE — 93798 PHYS/QHP OP CAR RHAB W/ECG: CPT

## 2023-09-12 NOTE — PROGRESS NOTES
Cardiac Rehabilitation Plan of Care   150 Day Reassessment        Today's date: 2023   # of Exercise Sessions Completed: 32  Patient name: Bertram Payne      : 1950  Age: 68 y.o. MRN: [id number]  Referring Physician: Cameron Terry, *  Cardiologist: Dr. Myesha Ross   Provider: Richard marroquin  Clinician: Flavia Bernal MS, CEP       Dx:   Encounter Diagnosis   Name Primary? • S/P coronary artery stent placement Yes     Date of onset: 2023      SUMMARY OF PROGRESS:  Mr. Kyara Lindquist continues his cardiac rehabilitation program and has attended 32 sessions of his rehab program over the past 150 days. He is attending regularly 2x/week and is progressing his exercise times and intensities appropriately. He had a recent echo completed on 2023 where his EF improved to 55-60% and he was able to D/C his LifeVest. He has been doing very well in his rehab program from cardiac standpoint. He continues to increase his overall strength and balance as well. He is currently completing 60 min of aerobic exercise reaching 3.4 METs on different modalities such as the TRM, Nustep, and UBE. He is also completing a strength training program 2x/week. Resting //88 with normal response to exercise 158/68. Telemetry shows NSR, with a couple bouts of Afib as well. Will continue to progress exercise times and intensities as he tolerates over next 30 days of rehab, and will be preparing for discharge from rehab after 4 more sessions. [place] He reports the following physical limitations: lower body weakness/balance. He is now able to walk with a cane instead of walker, and is working on improving balance while walking with his exercise at rehab. He is not completing a home exercise program with rehab. His goals for his program are to increase lower body strength with stamina with walking/balance, get back to hunting and fishing, and be able to help his wife get better/care taker.   He feels he is on target with his goals at 150 days. He is increasing his strength and endurance with exercise. He is limited with exercise due to knee pain, but he pushes as much as he can to complete his rehab sessions especially on the TRM. He is now able to walk with his cane rather than his walker which he is very happy about. He notices significant improvement in strength of his lets and balance. He is now scheduled for a knee replacement in November which he is also happy about. He feels he is more energized throughout the day and is not limited with his ADLs and yard work. He feels he will be able to get back to hunting and fishing after his knee replacement. He has been given weekly education sessions on cardiac risk factor management. Depression screening using the PHQ-9 interprets the patient's score of 1/2  as  No depression, showing great improvement (12 initial score). SERGE-7 screening tool for anxiety= 0 which is an improvement from score of 8 initially. When addressed, the patient admits to having depression/anxiety. He has been seeing a psychologist 1x/week. He and his family are currently dealing with possibility of his wife having a lung transplant. Patient reports excellent social/emotional support from family. PHQ-9 score will not  reassessed at 150 days due to recent scoring. They rate stress 8/10 with the following stressors: wife currently declining health and his recent health concerns. This is contributing to his depression but he reports he tries to maintain a positive outlook each day. Stress management will be reviewed. The patient is a non-smoker. Patient admits to 100% medication compliance. Will continue to educate, monitor, and progress as tolerated in the next 30 days.      Medication compliance: Yes   Comments: Pt reports to be compliant with medications  Fall Risk: Moderate   Comments: Patient uses walking assist device (walker/cane/rollator) and Denies a fall in the past 6 months    EKG Interpretation: Rapid Afib/NSR       EXERCISE ASSESSMENT and PLAN    Exercise Prescription:      Frequency: 2 days/week   Supplement with home exercise 2+ days/wk as tolerated       Minutes: 60-70       METS: 3.4           HR: RHR+ 30 beats above, monitor HR closely    RPE: 4-6         Modalities: Treadmill, UBE and NuStep      30 Day Goals for Exercise Progression:    Frequency: 2 days/week of cardiac rehab       Supplement with home exercise 2+ days/wk as tolerated    Minutes: 60-70                            >150 mins/wk of moderate intensity exercise   METS: 3.5-3.8   HR: RHR+30 beats, monitor HR closely     RPE: 3-4   Modalities: Treadmill, UBE, NuStep and Recumbent bike    Strength trainin-3 days / week  12-15 repetitions  1-2 sets per modality    Modalities: Pull Downs, Lateral Raise, Arm Extension, Arm Curl, Sit to Stands, Upright Rows, Front Raises and Shoulder Shrugs    Home Exercise: none-have encouraged walking at home 2x/week on non-rehab days for 10-15 min as toelrated    Goals: 10% improvement in functional capacity - based on max METs achieved in fitness assessment, improved DASI score by 10%, Increase in exercise capacity by 40% - based on peak METs tolerated in cardiac rehab exercise session, Exercise 5 days/wk, >150mins/wk of moderate intensity exercise, Resume ADLs with increased strength, Attend Rehab regularly, Decrease sitting time and start a home exercise program    Progression Toward Goals:  Pt is progressing and showing improvement  toward the following goals:  increasing strength and endurance with exercise to be able to normal activities and care for his wife, increasing functional capacity shown by increased MET levels, attending rehab sessions 2x/week regularly, and completing a strength training program.  , Pt has not made progress toward the following goals: no home exercise with rehab . , Patient will start to plan structured plan for home exercise for discharge  in the next 30 days, Will continue to educate and progress as tolerated. Education: benefit of exercise for CAD risk factors, home exercise guidelines, AHA guidelines to achieve >150 mins/wk of moderate exercise, RPE scale, class: Risk Factors for Heart Disease and physical activity/exercise in extreme weather conditions   Plan:education on home exercise guidelines, home exercise 30+ mins 2 days opposite CR and Education class: Risk Factors for Heart Disease  Readiness to change: Action:  (Changing behavior)      NUTRITION ASSESSMENT AND PLAN    Weight control:    Starting weight: 246.6 lbs    Current weight: 237 lb    Diabetes: N/A  A1c: 6.2    last measured: 4/9/2023    Lipid management: Discussed diet and lipid management and Last lipid profile 4/9/2023  Chol 229    HDL 45      Goals:reduced BMI to < 25, LDL <100, TRG <150, CHOL <200, decreased body fat% <25%   (M), fasting BG , Improved Rate Your Plate score  >57, 7.3-6%  wt loss, increase intake of fish, shellfish, increase intake of meatless meals, use low fat dairy, eat 3 or more servings of whole grains a day, choose low sodium processed foods, eliminate butter, Increase intake of nuts and seeds, daily saturated fat intake <7%/13g and Pt has made many changes since his event     Measurable goals were based Rate Your Plate Dietary Self-Assessment. These are the areas in which the patient could score higher on the assessment. Goals include recommendations for a heart healthy diet based on American Heart Association. Progression Toward Goals: Pt is progressing and showing improvement  toward the following goals:  doing well following healthy diet, has to follow diet to manage CKD and gets frustrated that he is battleing between what to eat with kidney disease and heart disease. Education on healthy eating and label reading. Weight loss with the program.  , Will continue to educate and progress as tolerated.       Education: heart healthy eating  low sodium diet  hydration  nutrition for  lipid management  healthy choices while dining out  portion control  education class: Heart Healthy Eating  education class:  Label Reading  Plan: Education class: Reading Food Labels, Education Class: Heart Healthy Eating, replace refined grain bread with whole grain bread, replace unhealthy snacks with fruits & vegs, switch to skim or 1% milk, avoid processed foods, remove salt shaker from table, use salt substitute like Mrs. Ruth, increase utilization of fresh or dried herbs, will try new grains like brown rice, quinoa, farro, drink more water and learn how to read food labels  Readiness to change: Action:  (Changing behavior)      PSYCHOSOCIAL ASSESSMENT AND PLAN    Emotional:  Depression assessment:  PHQ-9 = 12  10-14 = Moderate Depression Medically managing with seeing a psychologist weekly   60 day PHQ-9=1/2  showing  improvement from intial PHQ-9  60 Day SERGE-7=0 showing improvement from initial SERGE-7            Anxiety measure:  SERGE-7 = 8  5-9 = Mild anxiety  Self-reported stress level:  8-caring for wife and worrying about her declining health  Social support: Very Good    Goals:  Reduce perceived stress to 1-3/10, improved [facility] QOL < 27, PHQ-9 - reduced severity by one level, continue medical therapy, Feelings in [facility] Score < 3, Physical Fitness in [facility] Score < 3, Overall Health in [facility] Score < 3, Quality of Life in [facility] Score < 3 , Change in Health in [place] Score < 3 , Increased interest in doing things, improved sleep, improved concentration, improved positive thoughts of well being, increased energy, stop worrying, take time to relax and Feel less anxious    Progression Toward Goals: Pt is progressing and showing improvement  toward the following goals:  60 day improvement of PHQ-9 and SERGE-7, feeling better overall since getting LifeVest D/C, and good support system at home .  , Patient will continue to maintain emotional health  in the next 30 days, Will continue to educate and progress as tolerated. Education: signs/sxs of depression, benefits of a positive support system, stress management techniques, depression and CAD, benefits of mental health counseling and class:  Stress and Your Health   Plan: Class: Stress and Your Health, PHQ-9 >5 will refer to MD, Practice relaxation techniques, Exercise, Spend time outdoors, Enjoy a hobby, Keep a positive mindset, Reduce dependence  on family, Enjoy family and Repeat PHQ-9 every 30 days if score >5  Readiness to change: Action:  (Changing behavior)      OTHER CORE COMPONENTS     Tobacco:   Social History     Tobacco Use   Smoking Status Never   Smokeless Tobacco Never       Tobacco Use Intervention:   N/A:  Patient is a non-smoker     Anginal Symptoms:  SOB, nausea/vomiting and chest heaviness   NTG use: No prescription    Blood pressure:    Restin//88   Exercise: 158/68    Goals: consistent BP < 130/80, reduced dietary sodium <2300mg, moderate intensity exercise >150 mins/wk and medication compliance    Progression Toward Goals: Pt is progressing and showing improvement  toward the following goals:  BP is typically within normal resting limits, shows normal hemodynamic response to exercise, managing BP with increased exercise, medication compliance, and low sodium intake.  , Will continue to educate and progress as tolerated.     Education:  understanding high blood pressure and it's relationship to CAD, low sodium diet and HTN, Education class:  Common Heart Medications and Education class: Understanding Heart Disease  Plan: Class: Understanding Heart Disease, Class: Common Heart Medications, avoid places with second hand smoke, Avoid Processed foods, engage in regular exercise, eliminate salt shaker at the table, use salt substitutes, check labels for sodium content and monitor home BP  Readiness to change: Action (changing behavior)

## 2023-09-14 ENCOUNTER — CLINICAL SUPPORT (OUTPATIENT)
Dept: CARDIAC REHAB | Facility: HOSPITAL | Age: 73
End: 2023-09-14
Payer: COMMERCIAL

## 2023-09-14 DIAGNOSIS — Z95.5 S/P CORONARY ARTERY STENT PLACEMENT: Primary | ICD-10-CM

## 2023-09-14 LAB
EXT ALBUMIN URINE RANDOM: 9
MICROALBUMIN/CREAT UR: 12 MG/G{CREAT}

## 2023-09-14 PROCEDURE — 93798 PHYS/QHP OP CAR RHAB W/ECG: CPT

## 2023-09-15 ENCOUNTER — ANTICOAG VISIT (OUTPATIENT)
Dept: FAMILY MEDICINE CLINIC | Facility: HOSPITAL | Age: 73
End: 2023-09-15

## 2023-09-15 LAB
ALBUMIN SERPL-MCNC: 4.2 G/DL (ref 3.8–4.8)
ALBUMIN/GLOB SERPL: 1.7 {RATIO} (ref 1.2–2.2)
ALP SERPL-CCNC: 93 IU/L (ref 44–121)
ALT SERPL-CCNC: 11 IU/L (ref 0–44)
AST SERPL-CCNC: 18 IU/L (ref 0–40)
BASOPHILS # BLD AUTO: 0 X10E3/UL (ref 0–0.2)
BASOPHILS NFR BLD AUTO: 1 %
BILIRUB SERPL-MCNC: 0.4 MG/DL (ref 0–1.2)
BUN SERPL-MCNC: 34 MG/DL (ref 8–27)
BUN/CREAT SERPL: 14 (ref 10–24)
CALCIUM SERPL-MCNC: 9.4 MG/DL (ref 8.6–10.2)
CHLORIDE SERPL-SCNC: 99 MMOL/L (ref 96–106)
CO2 SERPL-SCNC: 25 MMOL/L (ref 20–29)
CREAT SERPL-MCNC: 2.42 MG/DL (ref 0.76–1.27)
EGFR: 28 ML/MIN/1.73
EOSINOPHIL # BLD AUTO: 0.1 X10E3/UL (ref 0–0.4)
EOSINOPHIL NFR BLD AUTO: 2 %
ERYTHROCYTE [DISTWIDTH] IN BLOOD BY AUTOMATED COUNT: 12.9 % (ref 11.6–15.4)
GLOBULIN SER-MCNC: 2.5 G/DL (ref 1.5–4.5)
GLUCOSE SERPL-MCNC: 115 MG/DL (ref 70–99)
HCT VFR BLD AUTO: 32 % (ref 37.5–51)
HGB BLD-MCNC: 10.8 G/DL (ref 13–17.7)
IMM GRANULOCYTES # BLD: 0 X10E3/UL (ref 0–0.1)
IMM GRANULOCYTES NFR BLD: 0 %
LYMPHOCYTES # BLD AUTO: 1.4 X10E3/UL (ref 0.7–3.1)
LYMPHOCYTES NFR BLD AUTO: 28 %
MCH RBC QN AUTO: 30.5 PG (ref 26.6–33)
MCHC RBC AUTO-ENTMCNC: 33.8 G/DL (ref 31.5–35.7)
MCV RBC AUTO: 90 FL (ref 79–97)
MONOCYTES # BLD AUTO: 0.4 X10E3/UL (ref 0.1–0.9)
MONOCYTES NFR BLD AUTO: 8 %
NEUTROPHILS # BLD AUTO: 3.1 X10E3/UL (ref 1.4–7)
NEUTROPHILS NFR BLD AUTO: 61 %
PLATELET # BLD AUTO: 198 X10E3/UL (ref 150–450)
POTASSIUM SERPL-SCNC: 4 MMOL/L (ref 3.5–5.2)
PROT SERPL-MCNC: 6.7 G/DL (ref 6–8.5)
RBC # BLD AUTO: 3.54 X10E6/UL (ref 4.14–5.8)
SODIUM SERPL-SCNC: 139 MMOL/L (ref 134–144)
WBC # BLD AUTO: 5.1 X10E3/UL (ref 3.4–10.8)

## 2023-09-19 ENCOUNTER — OFFICE VISIT (OUTPATIENT)
Dept: FAMILY MEDICINE CLINIC | Facility: HOSPITAL | Age: 73
End: 2023-09-19
Payer: COMMERCIAL

## 2023-09-19 ENCOUNTER — APPOINTMENT (OUTPATIENT)
Dept: CARDIAC REHAB | Facility: HOSPITAL | Age: 73
End: 2023-09-19
Payer: COMMERCIAL

## 2023-09-19 VITALS
OXYGEN SATURATION: 98 % | HEIGHT: 71 IN | BODY MASS INDEX: 32.68 KG/M2 | WEIGHT: 233.4 LBS | SYSTOLIC BLOOD PRESSURE: 138 MMHG | HEART RATE: 51 BPM | DIASTOLIC BLOOD PRESSURE: 78 MMHG

## 2023-09-19 DIAGNOSIS — I48.0 PAROXYSMAL A-FIB (HCC): ICD-10-CM

## 2023-09-19 DIAGNOSIS — I50.22 CHRONIC SYSTOLIC HEART FAILURE (HCC): ICD-10-CM

## 2023-09-19 DIAGNOSIS — I25.5 ISCHEMIC CARDIOMYOPATHY: ICD-10-CM

## 2023-09-19 DIAGNOSIS — Z23 NEED FOR INFLUENZA VACCINATION: ICD-10-CM

## 2023-09-19 DIAGNOSIS — I25.10 CORONARY ARTERY DISEASE INVOLVING NATIVE CORONARY ARTERY OF NATIVE HEART WITHOUT ANGINA PECTORIS: Primary | Chronic | ICD-10-CM

## 2023-09-19 DIAGNOSIS — M75.21 BICEPS TENDINITIS OF RIGHT UPPER EXTREMITY: ICD-10-CM

## 2023-09-19 DIAGNOSIS — I73.9 PERIPHERAL VASCULAR DISEASE, UNSPECIFIED (HCC): ICD-10-CM

## 2023-09-19 DIAGNOSIS — L30.9 ECZEMA, UNSPECIFIED TYPE: Primary | ICD-10-CM

## 2023-09-19 DIAGNOSIS — I35.0 MODERATE AORTIC STENOSIS: ICD-10-CM

## 2023-09-19 PROBLEM — I95.9 HYPOTENSION: Status: RESOLVED | Noted: 2023-04-18 | Resolved: 2023-09-19

## 2023-09-19 PROCEDURE — 99214 OFFICE O/P EST MOD 30 MIN: CPT | Performed by: INTERNAL MEDICINE

## 2023-09-19 PROCEDURE — 90662 IIV NO PRSV INCREASED AG IM: CPT

## 2023-09-19 PROCEDURE — G0008 ADMIN INFLUENZA VIRUS VAC: HCPCS

## 2023-09-19 RX ORDER — MOMETASONE FUROATE 1 MG/G
CREAM TOPICAL DAILY
Qty: 45 G | Refills: 1 | Status: SHIPPED | OUTPATIENT
Start: 2023-09-19

## 2023-09-19 NOTE — PROGRESS NOTES
Name: Fausto Kessler      : 1950      MRN: [id number]  Encounter Provider: Franca Cm DO  Encounter Date: 2023   Encounter department: [address]     1. Coronary artery disease involving native coronary artery of native heart without angina pectoris  -     XR shoulder 2+ vw right; Future; Expected date: 2023    2. Need for influenza vaccination  -     influenza vaccine, high-dose, PF 0.7 mL (FLUZONE HIGH-DOSE)    3. Paroxysmal A-fib ([address])    4. Chronic systolic heart failure (HCC)  -     Lipid Panel with Direct LDL reflex; Future  -     Lipid Panel with Direct LDL reflex    5. Ischemic cardiomyopathy  -     Lipid Panel with Direct LDL reflex; Future  -     Lipid Panel with Direct LDL reflex    6. Moderate aortic stenosis    7. Peripheral vascular disease, unspecified ([address])    8. Biceps tendinitis of right upper extremity  -     Diclofenac Sodium (VOLTAREN) 1 %; Apply 2 g topically 4 (four) times a day        Falls Plan of Care: balance, strength, and gait training instructions were provided. Doing well with mowing grass and doing  Exercise at  Cardiac rehab-- using cane for balance and safety        Subjective      Here  With son and  Wife-   1 bilateral  Osteoarthritis of knees- has  Deviation on right  Medially - is planning on having right tkr  with Dr. Cayden Mckeon- at [facility] - bilateral  knee pain for many years was waiting  For surgery for several years due to other medical issues - had recovered from  mrsa infection after  c spine surgery- discussed need to avoid public exposures preceding his surgery with covid rising in the community- disucussed new covid vaccine, rsv and also flu immunization recommendations  2, Right  Shoulder pain- has pain with reaching over head having  Trouble with sleeping on right side due to pain. Will have patient  See ortho if not improving     3.  Cad-prior MI -  improved  ej fraction  50- 60%  On last study. - has upcoming appointment with Caleb Marin for  preop clearance-   had MI  When driving home from [address] [facility] [address] juarez went to ed with his  Symptoms -   initially with transfer to cath lab-   wife is concerned that this occurred after being in hospital for 3 months  and no reported signs  Before that. Discussed need for  Lifestyle changes, tight lipid control and bp control -  will repeat lipid profile -   4. PAF- has occasinoal days of weakness and tiredness- discusssed using apple watch etc to check but no definite sensation of irregular beats  5, chronic anticoagulation- will s ee if we can get home  montior-as this may make it easier to control and test post op      Review of Systems   HENT: Negative for congestion. Respiratory: Negative for shortness of breath. Cardiovascular: Positive for palpitations. Negative for chest pain. Gastrointestinal: Negative for abdominal distention and abdominal pain. Genitourinary: Negative for difficulty urinating. Musculoskeletal: Positive for arthralgias. All other systems reviewed and are negative.       Current Outpatient Medications on File Prior to Visit   Medication Sig   • acetaminophen (TYLENOL) 500 mg tablet Take 500 mg by mouth if needed for mild pain   • ascorbic acid (VITAMIN C) 500 MG tablet Take 1 tablet (500 mg total) by mouth 2 (two) times a day   • atorvastatin (LIPITOR) 80 mg tablet Take 1 tablet (80 mg total) by mouth every evening   • clopidogrel (PLAVIX) 75 mg tablet Take 1 tablet (75 mg total) by mouth daily   • DULoxetine (CYMBALTA) 60 mg delayed release capsule TAKE 1 CAPSULE BY MOUTH EVERY DAY   • ferrous sulfate 324 (65 Fe) mg Take 1 tablet (324 mg total) by mouth 2 (two) times a day before meals   • folic acid (FOLVITE) 1 mg tablet Take 1 tablet (1 mg total) by mouth daily   • gabapentin (NEURONTIN) 300 mg capsule TAKE 1 CAPSULE BY MOUTH DAILY AT BEDTIME   • metoprolol succinate (TOPROL-XL) 25 mg 24 hr tablet TAKE 1 TABLET BY MOUTH TWICE A DAY   • Multiple Vitamins-Minerals (multivitamin with minerals) tablet Take 1 tablet by mouth daily   • potassium chloride (MICRO-K) 10 MEQ CR capsule TAKE 1 CAPSULE BY MOUTH 2 TIMES A DAY. • tamsulosin (FLOMAX) 0.4 mg TAKE 1 CAPSULE BY MOUTH DAILY  WITH DINNER   • torsemide (DEMADEX) 20 mg tablet Take 1 tablet (20 mg total) by mouth daily   • warfarin (COUMADIN) 5 mg tablet TAKE ONE-HALF TABLET BY MOUTH  DAILY , EXCEPT TAKE 1 TABLET BY  MOUTH ON WEDNESDAY AND SATURDAY   • zolpidem (AMBIEN) 10 mg tablet Take 1 tablet (10 mg total) by mouth daily at bedtime as needed for sleep   • nitroglycerin (NITROSTAT) 0.4 mg SL tablet Place 1 tablet (0.4 mg total) under the tongue every 5 (five) minutes as needed for chest pain (Patient not taking: Reported on 9/19/2023)       Objective     /78   Pulse (!) 51   Ht 5' 11" (1.803 m)   Wt 106 kg (233 lb 6.4 oz)   SpO2 98%   BMI 32.55 kg/m²     Physical Exam  Vitals and nursing note reviewed. Constitutional:       General: He is not in acute distress. HENT:      Head: Normocephalic. Right Ear: Tympanic membrane normal. There is no impacted cerumen. Left Ear: Tympanic membrane normal. There is no impacted cerumen. Nose: No congestion. Mouth/Throat:      Pharynx: No oropharyngeal exudate or posterior oropharyngeal erythema. Eyes:      General: No scleral icterus. Right eye: No discharge. Cardiovascular:      Rate and Rhythm: Normal rate and regular rhythm. Heart sounds: No murmur heard. Pulmonary:      Effort: No respiratory distress. Breath sounds: No wheezing or rhonchi. Abdominal:      Palpations: Abdomen is soft. Tenderness: There is no abdominal tenderness. There is no right CVA tenderness or left CVA tenderness. Hernia: No hernia is present. Musculoskeletal:         General: Tenderness present. No swelling. Cervical back: No rigidity.       Comments: Right knee swelling   using cane   Skin:     Findings: No erythema. Neurological:      General: No focal deficit present. Mental Status: He is alert. Psychiatric:         Mood and Affect: Mood normal.         Thought Content:  Thought content normal.         Judgment: Judgment normal.       Sriram Warren DO

## 2023-09-26 ENCOUNTER — CLINICAL SUPPORT (OUTPATIENT)
Dept: CARDIAC REHAB | Facility: HOSPITAL | Age: 73
End: 2023-09-26
Payer: COMMERCIAL

## 2023-09-26 DIAGNOSIS — Z95.5 S/P CORONARY ARTERY STENT PLACEMENT: Primary | ICD-10-CM

## 2023-09-26 PROCEDURE — 93798 PHYS/QHP OP CAR RHAB W/ECG: CPT

## 2023-09-28 ENCOUNTER — CLINICAL SUPPORT (OUTPATIENT)
Dept: CARDIAC REHAB | Facility: HOSPITAL | Age: 73
End: 2023-09-28
Payer: COMMERCIAL

## 2023-09-28 ENCOUNTER — OFFICE VISIT (OUTPATIENT)
Dept: CARDIOLOGY CLINIC | Facility: CLINIC | Age: 73
End: 2023-09-28
Payer: COMMERCIAL

## 2023-09-28 VITALS
BODY MASS INDEX: 33.04 KG/M2 | DIASTOLIC BLOOD PRESSURE: 74 MMHG | SYSTOLIC BLOOD PRESSURE: 126 MMHG | HEART RATE: 58 BPM | HEIGHT: 71 IN | WEIGHT: 236 LBS

## 2023-09-28 DIAGNOSIS — Z01.818 PREOPERATIVE TESTING: ICD-10-CM

## 2023-09-28 DIAGNOSIS — M17.11 PRIMARY OSTEOARTHRITIS OF RIGHT KNEE: ICD-10-CM

## 2023-09-28 DIAGNOSIS — I48.0 PAROXYSMAL A-FIB (HCC): ICD-10-CM

## 2023-09-28 DIAGNOSIS — I35.0 MODERATE AORTIC STENOSIS: ICD-10-CM

## 2023-09-28 DIAGNOSIS — Z01.810 PREOP CARDIOVASCULAR EXAM: Primary | ICD-10-CM

## 2023-09-28 DIAGNOSIS — I25.5 ISCHEMIC CARDIOMYOPATHY: ICD-10-CM

## 2023-09-28 DIAGNOSIS — I25.10 CORONARY ARTERY DISEASE INVOLVING NATIVE CORONARY ARTERY OF NATIVE HEART WITHOUT ANGINA PECTORIS: Chronic | ICD-10-CM

## 2023-09-28 DIAGNOSIS — Z95.5 S/P CORONARY ARTERY STENT PLACEMENT: Primary | ICD-10-CM

## 2023-09-28 PROCEDURE — 93798 PHYS/QHP OP CAR RHAB W/ECG: CPT

## 2023-09-28 PROCEDURE — 99214 OFFICE O/P EST MOD 30 MIN: CPT | Performed by: INTERNAL MEDICINE

## 2023-09-28 PROCEDURE — 93000 ELECTROCARDIOGRAM COMPLETE: CPT | Performed by: INTERNAL MEDICINE

## 2023-09-28 RX ORDER — FOLIC ACID 1 MG/1
1000 TABLET ORAL DAILY
Qty: 30 TABLET | Refills: 0 | Status: SHIPPED | OUTPATIENT
Start: 2023-09-28

## 2023-09-28 NOTE — LETTER
Cardiology Pre Operative Clearance      PRE OPERATIVE CARDIAC RISK ASSESSMENT    09/28/23    Kenia Llamas  1950  [id number]    Case: [id number] Date/Time: 11/13/23 1000   Procedure: ARTHROPLASTY KNEE TOTAL and all associated procedures (Right: Knee)   Anesthesia type: Choice   Diagnosis: Primary osteoarthritis of right knee [M17.11]   Pre-op diagnosis: Primary osteoarthritis of right knee [M17.11]   Location: [facility] OR ROOM  / [address] [place] [address]   Surgeons: Noemi Garcia MD       No Cardiac Contraindication for Planned Surgical Procedures    Anticoagulation: See office visit note 9/28/23    Physician Comment: No further cardiac testing.      Electronically Signed: Chacorta Mao MD

## 2023-09-28 NOTE — PROGRESS NOTES
Cardiology Outpatient Follow-Up Note - Prisca Johnson 68 y.o. male MRN: [id number]      Assessment/Plan:    1. Preop cardiovascular exam  No cardiac contraindication to knee arthroplasty. Low  risk of perioperative cardiac complication. We did discuss management of warfarin and clopidogrel perioperatively. We will transition the patient from clopidogrel to aspirin. Additionally, we will attempt to use this opportunity to transition warfarin to Eliquis. The following patient instructions were provided    7 days prior to surgery, stop your clopidogrel (Plavix) and take one aspirin 325 mg (one dose)  6 days prior to surgery, start aspirin 81 mg daily -- you will remain on aspirin throughout surgery  3 days prior to surgery, begin holding warfarin -- at this point you will be on just aspirin 81 mg daily until surgery    After surgery, start Eliquis 5 mg twice daily. and continue aspirin 81 mg daily.   - POCT ECG    2. Coronary artery disease involving native coronary artery of native heart without angina pectoris  Without angina. Status post PCI to mid LAD April 2023 due to STEMI. Continue metoprolol XL 25 mg daily. On atorvastatin 80 mg daily; recheck lipid panel.   - Lipid Panel with Direct LDL reflex; Future  - Lipid Panel with Direct LDL reflex    3. Ischemic cardiomyopathy  His LV systolic function has recovered to an LVEF of about 60%. He is euvolemic, compensated. He remains on torsemide 20 mg daily which will likely remain required due to CKD 4.    4. Moderate aortic stenosis  Continue surveillance. Echo in 1 year. 5. Paroxysmal A-fib (HCC)  Paroxysmal.  He is in sinus rhythm today at 58 bpm.  Continue metoprolol XL 50 mg daily for rate control. He has been on warfarin for paroxysmal A-fib as well as factor V Leiden. We discussed transition to [address] today. He is interested in making the transition to Eliquis.     Given his CKD 4 with a GFR of under 30, we will opt for Eliquis 2.5 mg twice daily. He has upcoming orthopedic surgery in November which will require interruption of his warfarin. Furthermore, he is working out price estimates for the [employer]. Therefore, we will make the transition at the time of his surgery. See patient instructions above. - apixaban (Eliquis) 2.5 mg; Take 1 tablet (2.5 mg total) by mouth 2 (two) times a day  Dispense: 180 tablet; Refill: 3      We will see Magali Delgado back in 6 month for routine follow-up. Subjective:     HPI: Magali Delgado is a 68y.o. year old male  with paroxysmal atrial fibrillation, congestive heart failure, hypertension, moderate aortic stenosis, CKD 4, CAD and STEMI April 2023 with PCI to the proximal to mid LAD. Subsequently, developed ischemic cardiomyopathy with LVEF of 30 to 35 and has since recovered to 60-65%. He presents today for follow-up. He presents today for risk stratification for knee surgery. He has no worsening dyspnea, chest pain, or LE edema. Weight has been stable on torsemide 20 mg daily. He has been pushing himself at physical therapy without significant cardiac symptoms. Cardiac Testing:    Cardiac Cath 4/9/23 Impression         Successful PCI w/ non-overlapping ROBERT x2 of sequential prox & mid LAD culprit lesions    Residual mid RCA 80% diffuse lesion    Echo 4/10/23 Interpretation Summary         Left Ventricle: Left ventricular cavity size is normal. Wall thickness is moderately increased. There is moderate concentric hypertrophy. The left ventricular ejection fraction is 30-35%. Global longitudinal strain is reduced at -6%. There is moderate global hypokinesis with severe hypokinesis of the mid anterior, mid anteroseptal, mid anterolateral, and all apical segments. There is regional variation of the inferior wall. Diastolic function is moderately abnormal, consistent with grade II (pseudonormal) relaxation.     Right Ventricle: Wall motion is abnormal. There is severe hypokinesis of the apical free wall. Mitral Valve: There is mild regurgitation. Aortic Valve: There is moderate stenosis. The aortic valve velocity is increased due to stenosis but lower than expected due to the presence of decreased flow. EKGs, personally reviewed:  EKG in the office 9/28/2023 shows sinus bradycardia, 58 bpm, normal axis,  ms RBBB, otherwise normal intervals. Abnormal study. Relevant Labs & Results:  CMP 5/4/23 --  Cr 2.5, K 4.1  Lipid panel 4/9/23 -- , , HDL 45,  mg/dL -- on pravastatin 40 mg   LDL direct 4/9/23 -- 139 mg/dL       ROS:  Review of Systems:  Review of Systems    Objective:     Vitals:   Vitals:    09/28/23 1538   Pulse: 58   Weight: 107 kg (236 lb)   Height: 5' 11" (1.803 m)    Body surface area is 2.26 meters squared. Wt Readings from Last 3 Encounters:   09/28/23 107 kg (236 lb)   09/19/23 106 kg (233 lb 6.4 oz)   08/30/23 106 kg (234 lb 9.6 oz)       Physical Exam:    General: Mac Taylor is a well appearing male, in no acute distress, sitting comfortably  HEENT: moist mucous membranes, EOMI  Neck:  No JVD, supple, trachea midline  Cardiovascular: Soft S1, regular rate and rhythm, 2/6 SM  Pulmonary: normal respiratory effort, CTAB  Abdomen: soft and nondistended  Extremities: Trace bilateral pitting lower extremity edema. Warm and well perfused extremities. Neuro: no focal motor deficits, AAOx3 (person, place, time)  Psych: Normal mood and affect, cooperative    Medications (at the START of this encounter):   Outpatient Medications Prior to Visit   Medication Sig Dispense Refill    acetaminophen (TYLENOL) 500 mg tablet Take 500 mg by mouth if needed for mild pain      ascorbic acid (VITAMIN C) 500 MG tablet Take 1 tablet (500 mg total) by mouth 2 (two) times a day 30 tablet 0    atorvastatin (LIPITOR) 80 mg tablet Take 1 tablet (80 mg total) by mouth every evening 90 tablet 3    clopidogrel (PLAVIX) 75 mg tablet Take 1 tablet (75 mg total) by mouth daily 90 tablet 3    Diclofenac Sodium (VOLTAREN) 1 % Apply 2 g topically 4 (four) times a day 100 g 3    DULoxetine (CYMBALTA) 60 mg delayed release capsule TAKE 1 CAPSULE BY MOUTH EVERY DAY 90 capsule 1    ferrous sulfate 324 (65 Fe) mg Take 1 tablet (324 mg total) by mouth 2 (two) times a day before meals 60 tablet 0    folic acid (FOLVITE) 1 mg tablet TAKE 1 TABLET BY MOUTH DAILY 30 tablet 0    gabapentin (NEURONTIN) 300 mg capsule TAKE 1 CAPSULE BY MOUTH DAILY AT BEDTIME 90 capsule 1    metoprolol succinate (TOPROL-XL) 25 mg 24 hr tablet TAKE 1 TABLET BY MOUTH TWICE A  tablet 1    mometasone (ELOCON) 0.1 % cream Apply topically daily 45 g 1    Multiple Vitamins-Minerals (multivitamin with minerals) tablet Take 1 tablet by mouth daily 30 tablet 0    nitroglycerin (NITROSTAT) 0.4 mg SL tablet Place 1 tablet (0.4 mg total) under the tongue every 5 (five) minutes as needed for chest pain 30 tablet 0    potassium chloride (MICRO-K) 10 MEQ CR capsule TAKE 1 CAPSULE BY MOUTH 2 TIMES A DAY. 180 capsule 1    tamsulosin (FLOMAX) 0.4 mg TAKE 1 CAPSULE BY MOUTH DAILY  WITH DINNER 90 capsule 1    torsemide (DEMADEX) 20 mg tablet Take 1 tablet (20 mg total) by mouth daily 90 tablet 3    warfarin (COUMADIN) 5 mg tablet TAKE ONE-HALF TABLET BY MOUTH  DAILY , EXCEPT TAKE 1 TABLET BY  MOUTH ON WEDNESDAY AND SATURDAY 58 tablet 1    zolpidem (AMBIEN) 10 mg tablet Take 1 tablet (10 mg total) by mouth daily at bedtime as needed for sleep 90 tablet 0     No facility-administered medications prior to visit. Time Spent:  Total time (face-to-face and non-face-to-face) spent on today's visit was 30 minutes. This includes preparation for the visits (i.e. reviewing test results), performance of a medically appropriate history and examination, and orders for medications, tests or other procedures. This time is exclusive of procedures performed and time spent teaching.     BILLING BASED ON TIME    This note was completed in part utilizing [employer] voice recognition software. Grammatical errors, random word insertion, spelling mistakes, occasional wrong word or "sound-alike" substitutions and incomplete sentences may be an occasional consequence of the system secondary to software limitations, ambient noise and hardware issues. At the time of dictation, efforts were made to edit, clarify and /or correct errors. Please read the chart carefully and recognize, using context, where substitutions have occurred. If you have any questions or concerns about the context, text or information contained within the body of this dictation, please contact myself, the provider, for further clarification.

## 2023-09-28 NOTE — PATIENT INSTRUCTIONS
7 days prior to surgery, stop your clopidogrel (Plavix) and take one aspirin 325 mg (one dose)  6 days prior to surgery, start aspirin 81 mg daily -- you will remain on aspirin throughout surgery  3 days prior to surgery, begin holding warfarin -- at this point you will be on just aspirin 81 mg daily until surgery    After surgery, start Eliquis 5 mg twice daily. and continue aspirin 81 mg daily.

## 2023-10-03 ENCOUNTER — CLINICAL SUPPORT (OUTPATIENT)
Dept: CARDIAC REHAB | Facility: HOSPITAL | Age: 73
End: 2023-10-03
Payer: COMMERCIAL

## 2023-10-03 DIAGNOSIS — Z95.5 S/P CORONARY ARTERY STENT PLACEMENT: Primary | ICD-10-CM

## 2023-10-03 PROCEDURE — 93798 PHYS/QHP OP CAR RHAB W/ECG: CPT

## 2023-10-03 NOTE — PROGRESS NOTES
Cardiac Rehabilitation Plan of Care   Discharge        Today's date: 10/3/2023   # of Exercise Sessions Completed: 36  Patient name: Carl Jeffries      : 1950  Age: 68 y.o. MRN: [id number]  Referring Physician: Dorinda Vera, *  Cardiologist: Dr. Baljeet Ordoñez   Provider: [facility]  Clinician: Bina Leal MS, CEP       Dx: stent    Date of onset: 2023      SUMMARY OF PROGRESS:  Mr. Andrey Stephens has completed his cardiac rehabilitation program at 36 sessions. He has attended regularly 2x/week and has progressed his exercise times and intensities appropriately. He had an echo completed on 2023 where his EF improved to 55-60% and he was able to D/C his LifeVest. He has been doing very well in his rehab program from cardiac standpoint. He is also increasing his overall strength and balance as well. He is currently completing 60 min of aerobic exercise reaching 3.4 METs on different modalities such as the TRM, Nustep, and UBE. He is also completing a strength training program 2x/week. Resting //88 with normal response to exercise 158/68. Telemetry shows NSR, with a couple bouts of Afib as well.  house. He is not completing a home exercise program with rehab. His goals for his program were to increase lower body strength with stamina with walking/balance, get back to hunting and fishing, and be able to help his wife get better/care taker. He feels he has met his goals at completion of his rehab program. He has increased his strength and endurance with exercise. He is limited with exercise due to knee pain, but he pushes as much as he can to complete his rehab sessions especially on the TRM. He is now able to walk with his cane rather than his walker which he is very happy about. He notices significant improvement in strength of his lets and balance. He feels he is more energized throughout the day and is not limited with his ADLs and yard work.  He feels he will be able to get back to hunting and fishing if he is able to get knee replacement. He is planning to have B/L knee replacement soon. He has been given weekly education sessions on cardiac risk factor management. Depression screening using the PHQ-9 interprets the patient's score of 0  as  No depression, showing great improvement (12 initial score). SERGE-7 screening tool for anxiety= 0 which is an improvement from score of 8 initially. When addressed, the patient admits to having depression/anxiety. He has been seeing a psychologist 1x/week. He and his family are currently dealing with possibility of his wife having a lung transplant. Patient reports excellent social/emotional support from family. He rates stress 8/10 with the following stressors: wife currently declining health and his recent health concerns. This is contributing to his depression but he reports he tries to maintain a positive outlook each day. Stress management has been reviewed. The patient is a non-smoker. Patient admits to 100% medication compliance.      Medication compliance: Yes   Comments: Pt reports to be compliant with medications  Fall Risk: Moderate   Comments: Patient uses walking assist device (walker/cane/rollator) and Denies a fall in the past 6 months    EKG Interpretation: Rapid Afib/NSR       EXERCISE ASSESSMENT and PLAN    Exercise Prescription:      Frequency: 2 days/week   Supplement with home exercise 2+ days/wk as tolerated       Minutes: 60-70       METS: 3.4           HR: RHR+ 30 beats above, monitor HR closely    RPE: 4-6         Modalities: Treadmill, UBE and NuStep      Exercise Progression after Discharge:    Frequency: 3-5 days of gym or home exercise   Minutes: 45-60  >150 mins/wk of moderate intensity exercise   METS: 3.4-4.0   HR: 30 beats above resting   RPE: 4-6   Modalities: Treadmill, UBE, NuStep and Recumbent bike      Strength trainin-3 days / week  12-15 repetitions  1-2 sets per modality    Modalities: Teo Myers Lateral Raise, Arm Extension, Arm Curl, Sit to Stands, Upright Rows, Front Raises and Shoulder Shrugs    Home Exercise: none-have encouraged walking at home 2x/week on non-rehab days for 10-15 min as toelrated    Goals: 10% improvement in functional capacity - based on max METs achieved in fitness assessment, improved DASI score by 10%, Increase in exercise capacity by 40% - based on peak METs tolerated in cardiac rehab exercise session, Exercise 5 days/wk, >150mins/wk of moderate intensity exercise, Resume ADLs with increased strength, Attend Rehab regularly, Decrease sitting time and start a home exercise program    Progression Toward Goals:  Goals met: increasing strength and endurance with exercise to be able to normal activities and care for his wife, increasing functional capacity shown by increased MET levels shown in CR sessions and post TM ETT, attending rehab sessions 2x/week regularly, and completing a strength training program, has plan to continue exercise at the [facility] with [facility].       Education: benefit of exercise for CAD risk factors, home exercise guidelines, AHA guidelines to achieve >150 mins/wk of moderate exercise, RPE scale, class: Risk Factors for Heart Disease and physical activity/exercise in extreme weather conditions   Plan:education on home exercise guidelines, home exercise 30+ mins 2 days opposite CR and Education class: Risk Factors for Heart Disease  Readiness to change: Action:  (Changing behavior)      NUTRITION ASSESSMENT AND PLAN    Weight control:    Starting weight: 246.6 lbs    Current weight: 236.4 lbs    Diabetes: N/A  A1c: 6.2    last measured: 4/9/2023    Lipid management: Discussed diet and lipid management and Last lipid profile 4/9/2023  Chol 229    HDL 45      Goals:reduced BMI to < 25, LDL <100, TRG <150, CHOL <200, decreased body fat% <25%   (M), fasting BG , Improved Rate Your Plate score  >48, 8.4-2%  wt loss, increase intake of fish, shellfish, increase intake of meatless meals, use low fat dairy, eat 3 or more servings of whole grains a day, choose low sodium processed foods, eliminate butter, Increase intake of nuts and seeds, daily saturated fat intake <7%/13g and Pt has made many changes since his event     Measurable goals were based Rate Your Plate Dietary Self-Assessment. These are the areas in which the patient could score higher on the assessment. Goals include recommendations for a heart healthy diet based on American Heart Association. Progression Toward Goals: Goals met: doing well following healthy diet, has to follow diet to manage CKD and gets frustrated that he is battleing between what to eat with kidney disease and heart disease. Education on healthy eating and label reading. Weight loss with the program. has lost 10 lbs since starting rehab program.      Education: heart healthy eating  low sodium diet  hydration  nutrition for  lipid management  healthy choices while dining out  portion control  education class: Heart Healthy Eating  education class:  Label Reading  Plan: Education class: Reading Food Labels, Education Class: Heart Healthy Eating, replace refined grain bread with whole grain bread, replace unhealthy snacks with fruits & vegs, switch to skim or 1% milk, avoid processed foods, remove salt shaker from table, use salt substitute like Mrs. Ruth, increase utilization of fresh or dried herbs, will try new grains like brown rice, quinoa, farro, drink more water and learn how to read food labels  Readiness to change: Action:  (Changing behavior)      PSYCHOSOCIAL ASSESSMENT AND PLAN    Emotional:  Depression assessment:  PHQ-9 = 0  No depression (12 initial)  Medically managing with seeing a psychologist weekly   SERGE-7=0 (9 initial) showing improvement            Anxiety measure:  SERGE-7 = 8  5-9 = Mild anxiety  Self-reported stress level:  8-caring for wife and worrying about her declining health, supporting her through decision to have lung transplant  Social support: Very Good    Goals:  Reduce perceived stress to 1-3/10, improved [facility] QOL < 27, PHQ-9 - reduced severity by one level, continue medical therapy, Feelings in [facility] Score < 3, Physical Fitness in [facility] Score < 3, Overall Health in [facility] Score < 3, Quality of Life in [facility] Score < 3 , Change in Health in [place] Score < 3 , Increased interest in doing things, improved sleep, improved concentration, improved positive thoughts of well being, increased energy, stop worrying, take time to relax and Feel less anxious    Progression Toward Goals: Goals met: improvement of PHQ-9 and SERGE-7, feeling better overall since getting LifeVest D/C, and good support system at home . Darylemaxi Barreto       Education: signs/sxs of depression, benefits of a positive support system, stress management techniques, depression and CAD, benefits of mental health counseling and class:  Stress and Your Health   Plan: Class: Stress and Your Health, PHQ-9 >5 will refer to MD, Practice relaxation techniques, Exercise, Spend time outdoors, Enjoy a hobby, Keep a positive mindset, Reduce dependence  on family, Enjoy family and Repeat PHQ-9 every 30 days if score >5  Readiness to change: Action:  (Changing behavior)      OTHER CORE COMPONENTS     Tobacco:   Social History     Tobacco Use   Smoking Status Never   Smokeless Tobacco Never       Tobacco Use Intervention:   N/A:  Patient is a non-smoker     Anginal Symptoms:  SOB, nausea/vomiting and chest heaviness   NTG use: No prescription    Blood pressure:    Restin//88   Exercise: 120//68    Goals: consistent BP < 130/80, reduced dietary sodium <2300mg, moderate intensity exercise >150 mins/wk and medication compliance    Progression Toward Goals: Goals met: BP is typically within normal resting limits, shows normal hemodynamic response to exercise, managing BP with increased exercise, medication compliance, and low sodium intake. .    Education:  understanding high blood pressure and it's relationship to CAD, low sodium diet and HTN, Education class:  Common Heart Medications and Education class: Understanding Heart Disease  Plan: Class: Understanding Heart Disease, Class: Common Heart Medications, avoid places with second hand smoke, Avoid Processed foods, engage in regular exercise, eliminate salt shaker at the table, use salt substitutes, check labels for sodium content and monitor home BP  Readiness to change: Action (changing behavior)

## 2023-10-06 ENCOUNTER — TELEPHONE (OUTPATIENT)
Dept: OBGYN CLINIC | Facility: CLINIC | Age: 73
End: 2023-10-06

## 2023-10-06 ENCOUNTER — TELEPHONE (OUTPATIENT)
Dept: CARDIOLOGY CLINIC | Facility: CLINIC | Age: 73
End: 2023-10-06

## 2023-10-06 ENCOUNTER — TELEPHONE (OUTPATIENT)
Age: 73
End: 2023-10-06

## 2023-10-06 NOTE — TELEPHONE ENCOUNTER
As per our discussion today, patient said he will remain on the coumadin for now. He has not yet checked into the cost of the Eliquis. He will let you know if he decides to make the switch.

## 2023-10-06 NOTE — TELEPHONE ENCOUNTER
Called pt per task (unable to reach pt's wife at number given). Explained to pt that I have no messages stating that he needs to be postponed for 6 months and that I will need to investigate this further. Pt requests that I continue to try to reach his wife as he's not home and she is his medical advocate. I will continue to try to reach her.

## 2023-10-06 NOTE — TELEPHONE ENCOUNTER
Caller: Patients wife and patient    Doctor: Celi Prado    Reason for call: Patient and his wife are are calling stating they received a message that his surgery has to be postponed 6 months.  He is requesting a callback with an explanation    Call back#: [phone]

## 2023-10-10 ENCOUNTER — APPOINTMENT (OUTPATIENT)
Dept: CARDIAC REHAB | Facility: HOSPITAL | Age: 73
End: 2023-10-10
Payer: COMMERCIAL

## 2023-10-11 ENCOUNTER — ANTICOAG VISIT (OUTPATIENT)
Dept: FAMILY MEDICINE CLINIC | Facility: HOSPITAL | Age: 73
End: 2023-10-11

## 2023-10-11 ENCOUNTER — TELEPHONE (OUTPATIENT)
Dept: OBGYN CLINIC | Facility: CLINIC | Age: 73
End: 2023-10-11

## 2023-10-11 ENCOUNTER — OFFICE VISIT (OUTPATIENT)
Dept: NEPHROLOGY | Facility: CLINIC | Age: 73
End: 2023-10-11

## 2023-10-11 VITALS
DIASTOLIC BLOOD PRESSURE: 80 MMHG | SYSTOLIC BLOOD PRESSURE: 138 MMHG | BODY MASS INDEX: 33.18 KG/M2 | OXYGEN SATURATION: 98 % | WEIGHT: 237 LBS | HEART RATE: 52 BPM | HEIGHT: 71 IN

## 2023-10-11 DIAGNOSIS — D63.1 ANEMIA DUE TO STAGE 4 CHRONIC KIDNEY DISEASE: ICD-10-CM

## 2023-10-11 DIAGNOSIS — N18.4 ANEMIA DUE TO STAGE 4 CHRONIC KIDNEY DISEASE: ICD-10-CM

## 2023-10-11 DIAGNOSIS — N02.B9 IGA NEPHROPATHY DETERMINED BY BIOPSY OF KIDNEY: ICD-10-CM

## 2023-10-11 DIAGNOSIS — R60.0 LOWER EXTREMITY EDEMA: ICD-10-CM

## 2023-10-11 DIAGNOSIS — I25.10 CORONARY ARTERY DISEASE INVOLVING NATIVE CORONARY ARTERY OF NATIVE HEART WITHOUT ANGINA PECTORIS: Chronic | ICD-10-CM

## 2023-10-11 DIAGNOSIS — R80.8 OTHER PROTEINURIA: ICD-10-CM

## 2023-10-11 DIAGNOSIS — E83.39 HYPOPHOSPHATEMIA: ICD-10-CM

## 2023-10-11 DIAGNOSIS — I50.22 CHRONIC SYSTOLIC HEART FAILURE (HCC): ICD-10-CM

## 2023-10-11 DIAGNOSIS — N18.4 CKD (CHRONIC KIDNEY DISEASE) STAGE 4, GFR 15-29 ML/MIN (HCC): Primary | ICD-10-CM

## 2023-10-11 LAB
CHOLEST SERPL-MCNC: 159 MG/DL (ref 100–199)
CHOLEST SERPL-MCNC: 163 MG/DL (ref 100–199)
HDLC SERPL-MCNC: 49 MG/DL
HDLC SERPL-MCNC: 51 MG/DL
LDLC SERPL CALC-MCNC: 90 MG/DL (ref 0–99)
LDLC SERPL CALC-MCNC: 92 MG/DL (ref 0–99)
LDLC/HDLC SERPL: 1.8 RATIO (ref 0–3.6)
LDLC/HDLC SERPL: 1.8 RATIO (ref 0–3.6)
SL AMB VLDL CHOLESTEROL CALC: 20 MG/DL (ref 5–40)
SL AMB VLDL CHOLESTEROL CALC: 20 MG/DL (ref 5–40)
TRIGL SERPL-MCNC: 109 MG/DL (ref 0–149)
TRIGL SERPL-MCNC: 111 MG/DL (ref 0–149)

## 2023-10-11 RX ORDER — EZETIMIBE 10 MG/1
10 TABLET ORAL DAILY
Qty: 30 TABLET | Refills: 6 | Status: SHIPPED | OUTPATIENT
Start: 2023-10-11

## 2023-10-11 NOTE — PATIENT INSTRUCTIONS
1. JANEL d/t IgAN, biopsy proven, in setting of recent MRSA infection. Suspect CKD stage 4  -renal biopsy performed May 26, 2022 was limited as only 6 intact glomeruli were present for evaluation on light microscopy. IgA dominant immune complex deposition noted by immunofluorescence. Differential includes IgA nephropathy both primary and secondary forms versus infectious associated glomerulonephritis. Staph aureus infections have been associated with IgA dominant immune complex deposition. Patient did have recent MRSA surgical site infection so infection associated GN should be considered.  -prednisone 60 mg daily begun June 2nd, 2022. S/p prednisone taper, completed 9/1/22  -monitor CMP, CBC   -if this was a primary IgA nephropathy, it would be compatible with [place] classification of M0 E1 S0 T1-C1.  -of 38 glomeruli, 6 were intact, forehead global glomerulosclerosis, segmental sclerosis was absent. Moderate interstitial fibrosis and tubular atrophy as well as arterial intimal fibrosis and mild arterolar hyalinosis were noted  -did not require dialysis inpatient, permacath removed prior to discharge  -sCr 2.42 as of 9/14/23, slowly improving from 5.34 and seems to have stabilized. 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 Thankfully, sCr stable and proteinuria continues to improve  -recommend CKD education - referral placed     2. Hypophosphatemia - phos 2.7, in setting of JANEL. Repeat phos. Hold phos binders.      3. Proteinuria - UpCr in setting of IgAN shows UpCr improved from 18.8g to 1.5g as of 8/15/22, with latest UpCr 12mg/g as of 9/14/23(improved)  -consider ACEi initiation if UpCr rises above 1g      4. HTN - BP acceptable for age/CKD in office. Continue metoprolol 25mg twice daily, flomax, torsemide 20mg daily  -no longer on Cardura 1 mg at bedtime, amlodipine 5 mg daily     5. Anemia ? D/t underlying GN/CKD - Hgb 9.5 as of May 2023, received blood transfusion in the past, monitor CBC, previously receiving epogen infusions, on oral iron     6. LE edema likely d/t nephrotic syndrome as well as history of chronic diastolic CHF (grade 2 diastolic dysfunction noted on April 2022 ech)  - continue torsemide 20mg daily, will monitor K/SCr. Monitor daily weight/BP readings. Repeat CMP, Ua, UpCr and microalb:Cr, phos and CBC in Dec. 2023. Continue torsemide 20mg daily for edema/nephrotic syndrome. RTC in 3 months.

## 2023-10-11 NOTE — TELEPHONE ENCOUNTER
S/w pt to postpone surgery until April per recommendations of Dr. Arie Ivy and pt's cardiologist.  Pt disappointed but understands. I will cancel all related appts and surgery. New dates will be provided to patient at a later date. Pt verbalized understanding.

## 2023-10-11 NOTE — PROGRESS NOTES
NEPHROLOGY OUTPATIENT PROGRESS NOTE   Mac Bun 68 y.o. male MRN: [id number]  DATE: 10/11/2023  Reason for visit:   Chief Complaint   Patient presents with    Follow-up    Chronic Kidney Disease        Patient Instructions   1. JANEL d/t IgAN, biopsy proven, in setting of recent MRSA infection. Suspect CKD stage 4  -renal biopsy performed May 26, 2022 was limited as only 6 intact glomeruli were present for evaluation on light microscopy. IgA dominant immune complex deposition noted by immunofluorescence. Differential includes IgA nephropathy both primary and secondary forms versus infectious associated glomerulonephritis. Staph aureus infections have been associated with IgA dominant immune complex deposition. Patient did have recent MRSA surgical site infection so infection associated GN should be considered.  -prednisone 60 mg daily begun June 2nd, 2022. S/p prednisone taper, completed 9/1/22  -monitor CMP, CBC   -if this was a primary IgA nephropathy, it would be compatible with Bibb classification of M0 E1 S0 T1-C1.  -of 38 glomeruli, 6 were intact, forehead global glomerulosclerosis, segmental sclerosis was absent. Moderate interstitial fibrosis and tubular atrophy as well as arterial intimal fibrosis and mild arterolar hyalinosis were noted  -did not require dialysis inpatient, permacath removed prior to discharge  -sCr 2.42 as of 9/14/23, slowly improving from 5.34 and seems to have stabilized. 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  Thankfully, sCr stable and proteinuria continues to improve  -recommend CKD education - referral placed     2. Hypophosphatemia - phos 2.7, in setting of JANEL. Repeat phos. Hold phos binders. 3. Proteinuria - UpCr in setting of IgAN shows UpCr improved from 18.8g to 1.5g as of 8/15/22, with latest UpCr 12mg/g as of 9/14/23(improved)  -consider ACEi initiation if UpCr rises above 1g      4. HTN - BP acceptable for age/CKD in office. Continue metoprolol 25mg twice daily, flomax, torsemide 20mg daily  -no longer on Cardura 1 mg at bedtime, amlodipine 5 mg daily     5. Anemia ? D/t underlying GN/CKD - Hgb 9.5 as of May 2023, received blood transfusion in the past, monitor CBC, previously receiving epogen infusions, on oral iron     6. LE edema likely d/t nephrotic syndrome as well as history of chronic diastolic CHF (grade 2 diastolic dysfunction noted on April 2022 ech)  - continue torsemide 20mg daily, will monitor K/SCr. Monitor daily weight/BP readings. Repeat CMP, Ua, UpCr and microalb:Cr, phos and CBC in Dec. 2023. Continue torsemide 20mg daily for edema/nephrotic syndrome. RTC in 3 months. Yeimi Wheeler was seen today for follow-up and chronic kidney disease. Diagnoses and all orders for this visit:    CKD (chronic kidney disease) stage 4, GFR 15-29 ml/min ([facility])  -     Urinalysis with microscopic; Future  -     Protein / creatinine ratio, urine; Future  -     Albumin / creatinine urine ratio; Future  -     Phosphorus; Future  -     CBC and differential; Future  -     Comprehensive metabolic panel;  Future  -     Urinalysis with microscopic  -     Protein / creatinine ratio, urine  -     Albumin / creatinine urine ratio  -     Phosphorus  -     CBC and differential  -     Comprehensive metabolic panel  -     Ambulatory Referral to CKD Education Program; Future    Chronic systolic heart failure (HCC)    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 JANEL d/t IgAN, biopsy proven, in setting of recent MRSA infection. Suspect CKD stage 4  -renal biopsy performed May 26, 2022 was limited as only 6 intact glomeruli were present for evaluation on light microscopy. IgA dominant immune complex deposition noted by immunofluorescence. Differential includes IgA nephropathy both primary and secondary forms versus infectious associated glomerulonephritis. Staph aureus infections have been associated with IgA dominant immune complex deposition. Patient did have recent MRSA surgical site infection so infection associated GN should be considered.  -prednisone 60 mg daily begun June 2nd, 2022. S/p prednisone taper, completed 9/1/22  -monitor CMP, CBC   -if this was a primary IgA nephropathy, it would be compatible with Everett classification of M0 E1 S0 T1-C1.  -of 38 glomeruli, 6 were intact, forehead global glomerulosclerosis, segmental sclerosis was absent. Moderate interstitial fibrosis and tubular atrophy as well as arterial intimal fibrosis and mild arterolar hyalinosis were noted  -did not require dialysis inpatient, permacath removed prior to discharge  -sCr 2.42 as of 9/14/23, slowly improving from 5.34 and seems to have stabilized.    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 Thankfully, sCr stable and proteinuria continues to improve  -recommend CKD education - referral placed     2. Hypophosphatemia - phos 2.7, in setting of JANEL. Repeat phos. Hold phos binders. 3. Proteinuria - UpCr in setting of IgAN shows UpCr improved from 18.8g to 1.5g as of 8/15/22, with latest UpCr 12mg/g as of 9/14/23(improved)  -consider ACEi initiation if UpCr rises above 1g      4. HTN - BP acceptable for age/CKD in office. Continue metoprolol 25mg twice daily, flomax, torsemide 20mg daily  -no longer on Cardura 1 mg at bedtime, amlodipine 5 mg daily     5. Anemia ? D/t underlying GN/CKD - Hgb 9.5 as of May 2023, received blood transfusion in the past, monitor CBC, previously receiving epogen infusions, on oral iron     6. LE edema likely d/t nephrotic syndrome as well as history of chronic diastolic CHF (grade 2 diastolic dysfunction noted on April 2022 ech)  - continue torsemide 20mg daily, will monitor K/SCr. Monitor daily weight/BP readings. Repeat CMP, Ua, UpCr and microalb:Cr, phos and CBC in Dec. 2023. Continue torsemide 20mg daily for edema/nephrotic syndrome. RTC in 3 months. SUBJECTIVE / INTERVAL HISTORY:  68 y.o. male presents in follow up of IgAN. Kristen Reeves denies any recent illness/hospitalizations/medication changes since last office visit. Had flu shot a few weeks ago. Got covid shot last week. Denies NSAID use. HTN - BP at home well controlled at home. Weight a  bit higher up to by 4 lbs since last visit. Does have a little leg edema. Wife is now on 15L and at least 12L at home. Has 2 concentrators and so she does not accompany him in the office today. She is trying to get a lung transplant. Right knee surgery to take place in Nov. 2023 but it has been cancelled due to being on plavix. Review of Systems   Constitutional:  Negative for chills and fever. HENT:  Negative for sore throat.     Eyes:  Negative for visual disturbance. Respiratory:  Negative for cough and shortness of breath. Cardiovascular:  Negative for chest pain and leg swelling. Gastrointestinal:  Negative for abdominal pain, constipation, diarrhea, nausea and vomiting. Endocrine: Negative for polyuria. Genitourinary:  Positive for frequency. Negative for decreased urine volume, difficulty urinating, dysuria and hematuria. Musculoskeletal:  Negative for back pain and myalgias. Skin:  Negative for rash. Dry skin over legs   Neurological:  Positive for light-headedness (occasional, denies falls) and numbness (in left hand). Negative for dizziness. Psychiatric/Behavioral:  Negative for confusion. OBJECTIVE:  /80 (BP Location: Left arm, Patient Position: Sitting, Cuff Size: Adult)   Pulse (!) 52   Ht 5' 11" (1.803 m)   Wt 108 kg (237 lb)   SpO2 98%   BMI 33.05 kg/m²  Body mass index is 33.05 kg/m². Physical exam:  Physical Exam  Vitals reviewed. Constitutional:       General: He is not in acute distress. Appearance: Normal appearance. He is well-developed. He is not diaphoretic. HENT:      Head: Normocephalic and atraumatic. Nose: Nose normal.   Eyes:      General: No scleral icterus. Right eye: No discharge. Left eye: No discharge. Neck:      Thyroid: No thyromegaly. Cardiovascular:      Rate and Rhythm: Normal rate and regular rhythm. Heart sounds: Normal heart sounds. Pulmonary:      Effort: Pulmonary effort is normal.      Breath sounds: Normal breath sounds. No wheezing or rales. Abdominal:      General: Bowel sounds are normal. There is no distension. Palpations: Abdomen is soft. Tenderness: There is no abdominal tenderness. Musculoskeletal:         General: Swelling (left ankle edema) present. Normal range of motion. Cervical back: Neck supple. Lymphadenopathy:      Cervical: No cervical adenopathy. Skin:     General: Skin is warm and dry. Findings: No rash. Neurological:      General: No focal deficit present. Mental Status: He is alert.       Comments: awake   Psychiatric:         Mood and Affect: Mood normal.         Behavior: Behavior normal.         Medications:    Current Outpatient Medications:     acetaminophen (TYLENOL) 500 mg tablet, Take 500 mg by mouth if needed for mild pain, Disp: , Rfl:     apixaban (Eliquis) 2.5 mg, Take 1 tablet (2.5 mg total) by mouth 2 (two) times a day, Disp: 180 tablet, Rfl: 3    ascorbic acid (VITAMIN C) 500 MG tablet, Take 1 tablet (500 mg total) by mouth 2 (two) times a day, Disp: 30 tablet, Rfl: 0    atorvastatin (LIPITOR) 80 mg tablet, Take 1 tablet (80 mg total) by mouth every evening, Disp: 90 tablet, Rfl: 3    clopidogrel (PLAVIX) 75 mg tablet, Take 1 tablet (75 mg total) by mouth daily, Disp: 90 tablet, Rfl: 3    Diclofenac Sodium (VOLTAREN) 1 %, Apply 2 g topically 4 (four) times a day, Disp: 100 g, Rfl: 3    DULoxetine (CYMBALTA) 60 mg delayed release capsule, TAKE 1 CAPSULE BY MOUTH EVERY DAY, Disp: 90 capsule, Rfl: 1    ezetimibe (ZETIA) 10 mg tablet, Take 1 tablet (10 mg total) by mouth daily, Disp: 30 tablet, Rfl: 6    ferrous sulfate 324 (65 Fe) mg, Take 1 tablet (324 mg total) by mouth 2 (two) times a day before meals, Disp: 60 tablet, Rfl: 0    folic acid (FOLVITE) 1 mg tablet, TAKE 1 TABLET BY MOUTH DAILY, Disp: 30 tablet, Rfl: 0    gabapentin (NEURONTIN) 300 mg capsule, TAKE 1 CAPSULE BY MOUTH DAILY AT BEDTIME, Disp: 90 capsule, Rfl: 1    metoprolol succinate (TOPROL-XL) 25 mg 24 hr tablet, TAKE 1 TABLET BY MOUTH TWICE A DAY, Disp: 180 tablet, Rfl: 1    mometasone (ELOCON) 0.1 % cream, Apply topically daily, Disp: 45 g, Rfl: 1    Multiple Vitamins-Minerals (multivitamin with minerals) tablet, Take 1 tablet by mouth daily, Disp: 30 tablet, Rfl: 0    nitroglycerin (NITROSTAT) 0.4 mg SL tablet, Place 1 tablet (0.4 mg total) under the tongue every 5 (five) minutes as needed for chest pain, Disp: 30 tablet, Rfl: 0    potassium chloride (MICRO-K) 10 MEQ CR capsule, TAKE 1 CAPSULE BY MOUTH 2 TIMES A DAY., Disp: 180 capsule, Rfl: 1    tamsulosin (FLOMAX) 0.4 mg, TAKE 1 CAPSULE BY MOUTH DAILY  WITH DINNER, Disp: 90 capsule, Rfl: 1    torsemide (DEMADEX) 20 mg tablet, Take 1 tablet (20 mg total) by mouth daily, Disp: 90 tablet, Rfl: 3    warfarin (COUMADIN) 5 mg tablet, TAKE ONE-HALF TABLET BY MOUTH  DAILY , EXCEPT TAKE 1 TABLET BY  MOUTH ON WEDNESDAY AND SATURDAY, Disp: 58 tablet, Rfl: 1    zolpidem (AMBIEN) 10 mg tablet, Take 1 tablet (10 mg total) by mouth daily at bedtime as needed for sleep, Disp: 90 tablet, Rfl: 0    Allergies: Allergies as of 10/11/2023 - Reviewed 10/11/2023   Allergen Reaction Noted    Morphine GI Intolerance 09/11/2018    Vitamin k Other (See Comments) 12/28/2020       The following portions of the patient's history were reviewed and updated as appropriate: past family history, past surgical history and problem list.    Laboratory Results:  Lab Results   Component Value Date    SODIUM 139 09/14/2023    K 4.0 09/14/2023    CL 99 09/14/2023    CO2 25 09/14/2023    BUN 34 (H) 09/14/2023    CREATININE 2.42 (H) 09/14/2023    GLUC 115 (H) 09/14/2023    CALCIUM 9.4 05/22/2023        Lab Results   Component Value Date    CALCIUM 9.4 05/22/2023    PHOS 3.6 05/22/2023       Portions of the record may have been created with voice recognition software. Occasional wrong word or "sound a like" substitutions may have occurred due to the inherent limitations of voice recognition software. Read the chart carefully and recognize, using context, where substitutions have occurred.

## 2023-10-14 NOTE — TELEPHONE ENCOUNTER
Good morning  I called for an appointment or to change it  I need a follow up with Dr Finney Sour  He didn't really give instructions after I saw my heart attack on Easter day and I was in the hospital a few days  So please give me a call back  I need to schedule an appointment as soon as I can  Thank you  [phone] 
13-Oct-2023

## 2023-10-20 ENCOUNTER — TELEPHONE (OUTPATIENT)
Dept: ANESTHESIOLOGY | Facility: CLINIC | Age: 73
End: 2023-10-20

## 2023-11-03 DIAGNOSIS — I25.10 CORONARY ARTERY DISEASE INVOLVING NATIVE CORONARY ARTERY OF NATIVE HEART WITHOUT ANGINA PECTORIS: Chronic | ICD-10-CM

## 2023-11-03 RX ORDER — EZETIMIBE 10 MG/1
10 TABLET ORAL DAILY
Qty: 90 TABLET | Refills: 3 | Status: SHIPPED | OUTPATIENT
Start: 2023-11-03

## 2023-11-04 DIAGNOSIS — I25.2 HISTORY OF ST ELEVATION MYOCARDIAL INFARCTION (STEMI): ICD-10-CM

## 2023-11-04 DIAGNOSIS — I25.10 CORONARY ARTERY DISEASE INVOLVING NATIVE CORONARY ARTERY OF NATIVE HEART WITHOUT ANGINA PECTORIS: Chronic | ICD-10-CM

## 2023-11-09 RX ORDER — POTASSIUM CHLORIDE 750 MG/1
10 CAPSULE, EXTENDED RELEASE ORAL 2 TIMES DAILY
Qty: 180 CAPSULE | Refills: 1 | Status: SHIPPED | OUTPATIENT
Start: 2023-11-09

## 2023-11-16 ENCOUNTER — ANTICOAG VISIT (OUTPATIENT)
Dept: FAMILY MEDICINE CLINIC | Facility: HOSPITAL | Age: 73
End: 2023-11-16

## 2023-11-16 LAB
BASOPHILS # BLD AUTO: 0.1 X10E3/UL (ref 0–0.2)
BASOPHILS NFR BLD AUTO: 1 %
EOSINOPHIL # BLD AUTO: 0.1 X10E3/UL (ref 0–0.4)
EOSINOPHIL NFR BLD AUTO: 2 %
ERYTHROCYTE [DISTWIDTH] IN BLOOD BY AUTOMATED COUNT: 13.1 % (ref 11.6–15.4)
HCT VFR BLD AUTO: 36.6 % (ref 37.5–51)
HGB BLD-MCNC: 12 G/DL (ref 13–17.7)
IMM GRANULOCYTES # BLD: 0 X10E3/UL (ref 0–0.1)
IMM GRANULOCYTES NFR BLD: 0 %
LYMPHOCYTES # BLD AUTO: 1.6 X10E3/UL (ref 0.7–3.1)
LYMPHOCYTES NFR BLD AUTO: 28 %
MCH RBC QN AUTO: 29.4 PG (ref 26.6–33)
MCHC RBC AUTO-ENTMCNC: 32.8 G/DL (ref 31.5–35.7)
MCV RBC AUTO: 90 FL (ref 79–97)
MONOCYTES # BLD AUTO: 0.6 X10E3/UL (ref 0.1–0.9)
MONOCYTES NFR BLD AUTO: 11 %
NEUTROPHILS # BLD AUTO: 3.4 X10E3/UL (ref 1.4–7)
NEUTROPHILS NFR BLD AUTO: 58 %
PLATELET # BLD AUTO: 233 X10E3/UL (ref 150–450)
RBC # BLD AUTO: 4.08 X10E6/UL (ref 4.14–5.8)
WBC # BLD AUTO: 5.7 X10E3/UL (ref 3.4–10.8)

## 2023-12-02 DIAGNOSIS — G47.00 INSOMNIA, UNSPECIFIED TYPE: ICD-10-CM

## 2023-12-04 RX ORDER — ZOLPIDEM TARTRATE 10 MG/1
10 TABLET ORAL
Qty: 90 TABLET | Refills: 0 | Status: SHIPPED | OUTPATIENT
Start: 2023-12-04

## 2023-12-05 DIAGNOSIS — I48.0 PAROXYSMAL A-FIB (HCC): ICD-10-CM

## 2023-12-05 RX ORDER — WARFARIN SODIUM 5 MG/1
TABLET ORAL
Qty: 58 TABLET | Refills: 3 | Status: SHIPPED | OUTPATIENT
Start: 2023-12-05

## 2023-12-13 ENCOUNTER — ANTICOAG VISIT (OUTPATIENT)
Dept: FAMILY MEDICINE CLINIC | Facility: HOSPITAL | Age: 73
End: 2023-12-13

## 2023-12-15 ENCOUNTER — APPOINTMENT (OUTPATIENT)
Dept: RADIOLOGY | Facility: CLINIC | Age: 73
End: 2023-12-15
Payer: COMMERCIAL

## 2023-12-15 DIAGNOSIS — I25.10 CORONARY ARTERY DISEASE INVOLVING NATIVE CORONARY ARTERY OF NATIVE HEART WITHOUT ANGINA PECTORIS: Chronic | ICD-10-CM

## 2023-12-15 PROCEDURE — 73030 X-RAY EXAM OF SHOULDER: CPT

## 2023-12-19 DIAGNOSIS — M19.011 PRIMARY OSTEOARTHRITIS OF RIGHT SHOULDER: Primary | ICD-10-CM

## 2023-12-20 ENCOUNTER — ANTICOAG VISIT (OUTPATIENT)
Dept: FAMILY MEDICINE CLINIC | Facility: HOSPITAL | Age: 73
End: 2023-12-20

## 2023-12-21 ENCOUNTER — OFFICE VISIT (OUTPATIENT)
Dept: FAMILY MEDICINE CLINIC | Facility: HOSPITAL | Age: 73
End: 2023-12-21
Payer: COMMERCIAL

## 2023-12-21 VITALS
OXYGEN SATURATION: 98 % | HEART RATE: 48 BPM | WEIGHT: 242 LBS | DIASTOLIC BLOOD PRESSURE: 70 MMHG | SYSTOLIC BLOOD PRESSURE: 136 MMHG | HEIGHT: 71 IN | BODY MASS INDEX: 33.88 KG/M2

## 2023-12-21 DIAGNOSIS — I25.5 ISCHEMIC CARDIOMYOPATHY: ICD-10-CM

## 2023-12-21 DIAGNOSIS — M65.342 TRIGGER RING FINGER OF LEFT HAND: ICD-10-CM

## 2023-12-21 DIAGNOSIS — R60.0 PEDAL EDEMA: Primary | ICD-10-CM

## 2023-12-21 DIAGNOSIS — I35.0 MODERATE AORTIC STENOSIS: ICD-10-CM

## 2023-12-21 DIAGNOSIS — D68.51 FACTOR V LEIDEN MUTATION (HCC): ICD-10-CM

## 2023-12-21 DIAGNOSIS — I25.2 HISTORY OF ST ELEVATION MYOCARDIAL INFARCTION (STEMI): ICD-10-CM

## 2023-12-21 DIAGNOSIS — D63.1 ANEMIA DUE TO STAGE 4 CHRONIC KIDNEY DISEASE: ICD-10-CM

## 2023-12-21 DIAGNOSIS — N18.4 CKD (CHRONIC KIDNEY DISEASE) STAGE 4, GFR 15-29 ML/MIN (HCC): ICD-10-CM

## 2023-12-21 DIAGNOSIS — E78.2 MIXED HYPERLIPIDEMIA: ICD-10-CM

## 2023-12-21 DIAGNOSIS — I25.10 CORONARY ARTERY DISEASE INVOLVING NATIVE CORONARY ARTERY OF NATIVE HEART WITHOUT ANGINA PECTORIS: Chronic | ICD-10-CM

## 2023-12-21 DIAGNOSIS — N18.4 ANEMIA DUE TO STAGE 4 CHRONIC KIDNEY DISEASE: ICD-10-CM

## 2023-12-21 PROBLEM — F32.9 REACTIVE DEPRESSION: Status: ACTIVE | Noted: 2023-12-21

## 2023-12-21 PROBLEM — E66.01 OBESITY, MORBID (HCC): Status: RESOLVED | Noted: 2022-11-28 | Resolved: 2023-12-21

## 2023-12-21 PROCEDURE — 99214 OFFICE O/P EST MOD 30 MIN: CPT | Performed by: INTERNAL MEDICINE

## 2023-12-21 RX ORDER — TORSEMIDE 20 MG/1
TABLET ORAL
Qty: 135 TABLET | Refills: 3 | Status: SHIPPED | OUTPATIENT
Start: 2023-12-21

## 2023-12-21 NOTE — ASSESSMENT & PLAN NOTE
Having increased edema in  past month- is on torsemide 20 mg daily- by Dr. Jovel from 10 mg daily in September   Will increase to 30 mg one day alt with 20 mg daily   Has trouble walking- using a cane

## 2023-12-21 NOTE — ASSESSMENT & PLAN NOTE
Lab Results   Component Value Date    EGFR 33 (L) 12/11/2023    EGFR 35 (L) 11/15/2023    EGFR 28 (L) 09/14/2023    CREATININE 2.08 (H) 12/11/2023    CREATININE 2.01 (H) 11/15/2023    CREATININE 2.42 (H) 09/14/2023   Improving  crt to 2.08   yes

## 2023-12-21 NOTE — ASSESSMENT & PLAN NOTE
Increased stressors  with wife being evaluated  by lung transplant team-(pulmaonry fibrosis)   Patient feels his life  was changed after having mrsa infection after  back surgery--now using  cane regualrlary

## 2023-12-21 NOTE — PROGRESS NOTES
Assessment/Plan:     Diagnosis ICD-10-CM Associated Orders   1. Pedal edema  R60.0       2. CKD (chronic kidney disease) stage 4, GFR 15-29 ml/min ([facility])  N18.4       3. Factor V Leiden mutation ([facility])  D68.51       4. Ischemic cardiomyopathy  I25.5       5. Coronary artery disease involving native coronary artery of native heart without angina pectoris  I25.10       6. Moderate aortic stenosis  I35.0       7. Mixed hyperlipidemia  E78.2       8. Anemia due to stage 4 chronic kidney disease   N18.4     D63.1           Problem List Items Addressed This Visit          Cardiovascular and Mediastinum    Coronary artery disease involving native coronary artery of native heart without angina pectoris (Chronic)     Having increased edema in  past month- is on torsemide 20 mg daily- by Dr. Jovel from 10 mg daily in September   Will increase to 30 mg one day alt with 20 mg daily   Has trouble walking- using a cane         Ischemic cardiomyopathy    Moderate aortic stenosis     Last echo in June 2023            Hematopoietic and Hemostatic    Factor V Leiden mutation ([facility])       Genitourinary    CKD (chronic kidney disease) stage 4, GFR 15-29 ml/min ([facility])     Lab Results   Component Value Date    EGFR 33 (L) 12/11/2023    EGFR 35 (L) 11/15/2023    EGFR 28 (L) 09/14/2023    CREATININE 2.08 (H) 12/11/2023    CREATININE 2.01 (H) 11/15/2023    CREATININE 2.42 (H) 09/14/2023   Improving  crt to 2.08            Other    Mixed hyperlipidemia    Anemia due to stage 4 chronic kidney disease      Other Visit Diagnoses       Pedal edema    -  Primary              No follow-ups on file.      Subjective:    Patient ID: Tian Garcia is a 73 y.o. male    Here for 3 month follow up   Having increased edema - will increase torsemide dose to 30 mg alt with 20- will repeat labs in 2 months- also follows with swapnil    Worsening right shoulder pain-poor sleep - uanble to lie on that side and if on left side feels hip pain there  Now  increasing contractures of hands- has some trigger finger- has palmar burning  '  Planning for right knee surgery in April - after 3 years  since mrsa   Had mI and on warfarin and plavix- went to see cardilogy- given clearance - then being changed to April 2024     Neck surgery march 2021- then had mrsa- infection- - frustrated that that now doctors did not want to do  other surgeries        The following portions of the patient's history were reviewed and updated as appropriate: allergies, current medications and problem list.     Review of Systems   Constitutional:  Negative for fatigue.   Respiratory:  Negative for shortness of breath.    Cardiovascular:  Negative for chest pain and palpitations.   All other systems reviewed and are negative.        Objective:      Current Outpatient Medications:     acetaminophen (TYLENOL) 500 mg tablet, Take 500 mg by mouth if needed for mild pain, Disp: , Rfl:     atorvastatin (LIPITOR) 80 mg tablet, Take 1 tablet (80 mg total) by mouth every evening, Disp: 90 tablet, Rfl: 3    clopidogrel (PLAVIX) 75 mg tablet, Take 1 tablet (75 mg total) by mouth daily, Disp: 90 tablet, Rfl: 3    Diclofenac Sodium (VOLTAREN) 1 %, Apply 2 g topically 4 (four) times a day, Disp: 100 g, Rfl: 3    DULoxetine (CYMBALTA) 60 mg delayed release capsule, TAKE 1 CAPSULE BY MOUTH EVERY DAY, Disp: 90 capsule, Rfl: 1    ezetimibe (ZETIA) 10 mg tablet, TAKE 1 TABLET BY MOUTH EVERY DAY, Disp: 90 tablet, Rfl: 3    gabapentin (NEURONTIN) 300 mg capsule, TAKE 1 CAPSULE BY MOUTH DAILY AT BEDTIME, Disp: 90 capsule, Rfl: 1    metoprolol succinate (TOPROL-XL) 25 mg 24 hr tablet, TAKE 1 TABLET BY MOUTH TWICE A DAY, Disp: 180 tablet, Rfl: 1    mometasone (ELOCON) 0.1 % cream, Apply topically daily, Disp: 45 g, Rfl: 1    potassium chloride (MICRO-K) 10 MEQ CR capsule, TAKE 1 CAPSULE BY MOUTH TWICE A DAY, Disp: 180 capsule, Rfl: 1    tamsulosin (FLOMAX) 0.4 mg, TAKE 1 CAPSULE BY MOUTH DAILY  WITH DINNER, Disp: 90  "capsule, Rfl: 1    torsemide (DEMADEX) 20 mg tablet, Take 1 tablet (20 mg total) by mouth daily, Disp: 90 tablet, Rfl: 3    warfarin (COUMADIN) 5 mg tablet, TAKE ONE-HALF TABLET BY MOUTH  DAILY , EXCEPT TAKE 1 TABLET BY  MOUTH ON WEDNESDAY AND SATURDAY, Disp: 58 tablet, Rfl: 3    zolpidem (AMBIEN) 10 mg tablet, TAKE 1 TABLET BY MOUTH ONCE  DAILY AT BEDTIME AS NEEDED FOR  SLEEP, Disp: 90 tablet, Rfl: 0    ascorbic acid (VITAMIN C) 500 MG tablet, Take 1 tablet (500 mg total) by mouth 2 (two) times a day (Patient not taking: Reported on 12/21/2023), Disp: 30 tablet, Rfl: 0    ferrous sulfate 324 (65 Fe) mg, Take 1 tablet (324 mg total) by mouth 2 (two) times a day before meals (Patient not taking: Reported on 12/21/2023), Disp: 60 tablet, Rfl: 0    folic acid (FOLVITE) 1 mg tablet, TAKE 1 TABLET BY MOUTH DAILY (Patient not taking: Reported on 12/21/2023), Disp: 30 tablet, Rfl: 0    Multiple Vitamins-Minerals (multivitamin with minerals) tablet, Take 1 tablet by mouth daily (Patient not taking: Reported on 12/21/2023), Disp: 30 tablet, Rfl: 0    nitroglycerin (NITROSTAT) 0.4 mg SL tablet, Place 1 tablet (0.4 mg total) under the tongue every 5 (five) minutes as needed for chest pain (Patient not taking: Reported on 12/21/2023), Disp: 30 tablet, Rfl: 0    Blood pressure 136/70, pulse (!) 48, height 5' 11\" (1.803 m), weight 110 kg (242 lb), SpO2 98%.     Physical Exam  Vitals and nursing note reviewed.   Constitutional:       General: He is not in acute distress.  HENT:      Head: Normocephalic and atraumatic.      Nose: No congestion.      Mouth/Throat:      Pharynx: No oropharyngeal exudate or posterior oropharyngeal erythema.   Eyes:      General:         Right eye: No discharge.         Left eye: No discharge.   Cardiovascular:      Rate and Rhythm: Normal rate and regular rhythm.      Heart sounds: No murmur heard.  Pulmonary:      Breath sounds: No wheezing or rhonchi.   Abdominal:      General: There is no " distension.   Musculoskeletal:      Cervical back: No rigidity.      Right lower leg: Edema present.      Left lower leg: Edema present.      Comments: Hand contracture on left hand 3 and 4th digit   Skin:     Findings: No rash.   Neurological:      Mental Status: He is alert.   Psychiatric:         Mood and Affect: Mood normal.

## 2023-12-23 DIAGNOSIS — M19.012 PRIMARY OSTEOARTHRITIS OF LEFT SHOULDER: Primary | ICD-10-CM

## 2023-12-23 RX ORDER — TRAMADOL HYDROCHLORIDE 50 MG/1
50 TABLET ORAL EVERY 8 HOURS PRN
Qty: 90 TABLET | Refills: 0 | Status: SHIPPED | OUTPATIENT
Start: 2023-12-23

## 2023-12-23 RX ORDER — TRAMADOL HYDROCHLORIDE 50 MG/1
50 TABLET ORAL EVERY 8 HOURS PRN
Qty: 90 TABLET | Refills: 0 | Status: SHIPPED | OUTPATIENT
Start: 2023-12-23 | End: 2023-12-23 | Stop reason: SDUPTHER

## 2023-12-23 NOTE — PROGRESS NOTES
Telephone Call Documentation    Tian Garcia       1950               Discussed with Dr. Nicholas nephrology- will agree to using tramdol   I have sent in to local pharmacy- [facility] in [place] to help with pain contirol until he is able to have a shoulder replacement with dr. Cruz.   I have asked him to check PT/INT R early after on med x 1 week to assess if any interaction is noted

## 2024-01-04 ENCOUNTER — ANTICOAG VISIT (OUTPATIENT)
Dept: FAMILY MEDICINE CLINIC | Facility: HOSPITAL | Age: 74
End: 2024-01-04

## 2024-01-04 LAB
ALBUMIN SERPL-MCNC: 4.5 G/DL (ref 3.8–4.8)
ALBUMIN SERPL-MCNC: 4.5 G/DL (ref 3.8–4.8)
ALBUMIN/CREAT UR: 20 MG/G CREAT (ref 0–29)
ALBUMIN/GLOB SERPL: 1.8 {RATIO} (ref 1.2–2.2)
ALBUMIN/GLOB SERPL: 1.9 {RATIO} (ref 1.2–2.2)
ALP SERPL-CCNC: 88 IU/L (ref 44–121)
ALP SERPL-CCNC: 89 IU/L (ref 44–121)
ALT SERPL-CCNC: 15 IU/L (ref 0–44)
ALT SERPL-CCNC: 15 IU/L (ref 0–44)
APPEARANCE UR: CLEAR
AST SERPL-CCNC: 21 IU/L (ref 0–40)
AST SERPL-CCNC: 22 IU/L (ref 0–40)
BACTERIA URNS QL MICRO: NORMAL
BASOPHILS # BLD AUTO: 0 X10E3/UL (ref 0–0.2)
BASOPHILS # BLD AUTO: 0 X10E3/UL (ref 0–0.2)
BASOPHILS NFR BLD AUTO: 0 %
BASOPHILS NFR BLD AUTO: 1 %
BILIRUB SERPL-MCNC: 0.4 MG/DL (ref 0–1.2)
BILIRUB SERPL-MCNC: 0.5 MG/DL (ref 0–1.2)
BILIRUB UR QL STRIP: NEGATIVE
BUN SERPL-MCNC: 35 MG/DL (ref 8–27)
BUN SERPL-MCNC: 36 MG/DL (ref 8–27)
BUN/CREAT SERPL: 17 (ref 10–24)
BUN/CREAT SERPL: 17 (ref 10–24)
CALCIUM SERPL-MCNC: 9.3 MG/DL (ref 8.6–10.2)
CALCIUM SERPL-MCNC: 9.5 MG/DL (ref 8.6–10.2)
CASTS URNS QL MICRO: NORMAL /LPF
CHLORIDE SERPL-SCNC: 98 MMOL/L (ref 96–106)
CHLORIDE SERPL-SCNC: 98 MMOL/L (ref 96–106)
CO2 SERPL-SCNC: 25 MMOL/L (ref 20–29)
CO2 SERPL-SCNC: 27 MMOL/L (ref 20–29)
COLOR UR: YELLOW
CREAT SERPL-MCNC: 2.1 MG/DL (ref 0.76–1.27)
CREAT SERPL-MCNC: 2.16 MG/DL (ref 0.76–1.27)
CREAT UR-MCNC: 49.4 MG/DL
EGFR: 32 ML/MIN/1.73
EGFR: 33 ML/MIN/1.73
EOSINOPHIL # BLD AUTO: 0.1 X10E3/UL (ref 0–0.4)
EOSINOPHIL # BLD AUTO: 0.1 X10E3/UL (ref 0–0.4)
EOSINOPHIL NFR BLD AUTO: 1 %
EOSINOPHIL NFR BLD AUTO: 1 %
EPI CELLS #/AREA URNS HPF: NORMAL /HPF (ref 0–10)
ERYTHROCYTE [DISTWIDTH] IN BLOOD BY AUTOMATED COUNT: 13.2 % (ref 11.6–15.4)
ERYTHROCYTE [DISTWIDTH] IN BLOOD BY AUTOMATED COUNT: 13.3 % (ref 11.6–15.4)
GLOBULIN SER-MCNC: 2.4 G/DL (ref 1.5–4.5)
GLOBULIN SER-MCNC: 2.5 G/DL (ref 1.5–4.5)
GLUCOSE SERPL-MCNC: 115 MG/DL (ref 70–99)
GLUCOSE SERPL-MCNC: 117 MG/DL (ref 70–99)
GLUCOSE UR QL: NEGATIVE
HCT VFR BLD AUTO: 36.1 % (ref 37.5–51)
HCT VFR BLD AUTO: 36.7 % (ref 37.5–51)
HGB BLD-MCNC: 12.3 G/DL (ref 13–17.7)
HGB BLD-MCNC: 12.7 G/DL (ref 13–17.7)
HGB UR QL STRIP: ABNORMAL
IMM GRANULOCYTES # BLD: 0 X10E3/UL (ref 0–0.1)
IMM GRANULOCYTES # BLD: 0.1 X10E3/UL (ref 0–0.1)
IMM GRANULOCYTES NFR BLD: 0 %
IMM GRANULOCYTES NFR BLD: 1 %
KETONES UR QL STRIP: NEGATIVE
LEUKOCYTE ESTERASE UR QL STRIP: NEGATIVE
LYMPHOCYTES # BLD AUTO: 1.1 X10E3/UL (ref 0.7–3.1)
LYMPHOCYTES # BLD AUTO: 1.2 X10E3/UL (ref 0.7–3.1)
LYMPHOCYTES NFR BLD AUTO: 18 %
LYMPHOCYTES NFR BLD AUTO: 18 %
MCH RBC QN AUTO: 30.6 PG (ref 26.6–33)
MCH RBC QN AUTO: 30.9 PG (ref 26.6–33)
MCHC RBC AUTO-ENTMCNC: 34.1 G/DL (ref 31.5–35.7)
MCHC RBC AUTO-ENTMCNC: 34.6 G/DL (ref 31.5–35.7)
MCV RBC AUTO: 89 FL (ref 79–97)
MCV RBC AUTO: 90 FL (ref 79–97)
MICRO URNS: ABNORMAL
MICROALBUMIN UR-MCNC: 9.9 UG/ML
MONOCYTES # BLD AUTO: 0.4 X10E3/UL (ref 0.1–0.9)
MONOCYTES # BLD AUTO: 0.5 X10E3/UL (ref 0.1–0.9)
MONOCYTES NFR BLD AUTO: 7 %
MONOCYTES NFR BLD AUTO: 7 %
NEUTROPHILS # BLD AUTO: 4.4 X10E3/UL (ref 1.4–7)
NEUTROPHILS # BLD AUTO: 4.6 X10E3/UL (ref 1.4–7)
NEUTROPHILS NFR BLD AUTO: 73 %
NEUTROPHILS NFR BLD AUTO: 73 %
NITRITE UR QL STRIP: NEGATIVE
PH UR STRIP: 5.5 [PH] (ref 5–7.5)
PHOSPHATE SERPL-MCNC: 3.2 MG/DL (ref 2.8–4.1)
PLATELET # BLD AUTO: 234 X10E3/UL (ref 150–450)
PLATELET # BLD AUTO: 237 X10E3/UL (ref 150–450)
POTASSIUM SERPL-SCNC: 4 MMOL/L (ref 3.5–5.2)
POTASSIUM SERPL-SCNC: 4 MMOL/L (ref 3.5–5.2)
PROT SERPL-MCNC: 6.9 G/DL (ref 6–8.5)
PROT SERPL-MCNC: 7 G/DL (ref 6–8.5)
PROT UR QL STRIP: NEGATIVE
PROT UR-MCNC: 5.3 MG/DL
PROT/CREAT UR: 107 MG/G CREAT (ref 0–200)
RBC # BLD AUTO: 4.02 X10E6/UL (ref 4.14–5.8)
RBC # BLD AUTO: 4.11 X10E6/UL (ref 4.14–5.8)
RBC #/AREA URNS HPF: NORMAL /HPF (ref 0–2)
SODIUM SERPL-SCNC: 140 MMOL/L (ref 134–144)
SODIUM SERPL-SCNC: 141 MMOL/L (ref 134–144)
SP GR UR: 1.01 (ref 1–1.03)
UROBILINOGEN UR STRIP-ACNC: 0.2 MG/DL (ref 0.2–1)
WBC # BLD AUTO: 6 X10E3/UL (ref 3.4–10.8)
WBC # BLD AUTO: 6.3 X10E3/UL (ref 3.4–10.8)
WBC #/AREA URNS HPF: NORMAL /HPF (ref 0–5)

## 2024-01-10 DIAGNOSIS — R51.9 INTRACTABLE HEADACHE, UNSPECIFIED CHRONICITY PATTERN, UNSPECIFIED HEADACHE TYPE: ICD-10-CM

## 2024-01-10 DIAGNOSIS — N13.8 BPH WITH OBSTRUCTION/LOWER URINARY TRACT SYMPTOMS: ICD-10-CM

## 2024-01-10 DIAGNOSIS — N40.1 BPH WITH OBSTRUCTION/LOWER URINARY TRACT SYMPTOMS: ICD-10-CM

## 2024-01-10 RX ORDER — TAMSULOSIN HYDROCHLORIDE 0.4 MG/1
CAPSULE ORAL
Qty: 90 CAPSULE | Refills: 1 | Status: SHIPPED | OUTPATIENT
Start: 2024-01-10

## 2024-01-10 RX ORDER — GABAPENTIN 300 MG/1
CAPSULE ORAL
Qty: 90 CAPSULE | Refills: 1 | Status: SHIPPED | OUTPATIENT
Start: 2024-01-10

## 2024-01-16 ENCOUNTER — TELEPHONE (OUTPATIENT)
Dept: NEPHROLOGY | Facility: CLINIC | Age: 74
End: 2024-01-16

## 2024-01-16 NOTE — TELEPHONE ENCOUNTER
Pt called to cancel appt tomorrow 1/17 with Dr Covarrubias in the [facility]. He stated that he has a conflict in his schedule. Appt was rescheduled on 1/2524 with Dr Covarrubias in the [facility].

## 2024-01-22 ENCOUNTER — TELEPHONE (OUTPATIENT)
Dept: FAMILY MEDICINE CLINIC | Facility: HOSPITAL | Age: 74
End: 2024-01-22

## 2024-01-22 ENCOUNTER — TELEPHONE (OUTPATIENT)
Dept: OBGYN CLINIC | Facility: CLINIC | Age: 74
End: 2024-01-22

## 2024-01-22 ENCOUNTER — TELEPHONE (OUTPATIENT)
Dept: CARDIOLOGY CLINIC | Facility: CLINIC | Age: 74
End: 2024-01-22

## 2024-01-22 DIAGNOSIS — M19.012 PRIMARY OSTEOARTHRITIS OF LEFT SHOULDER: ICD-10-CM

## 2024-01-22 RX ORDER — TRAMADOL HYDROCHLORIDE 100 MG/1
100 TABLET, COATED ORAL EVERY 12 HOURS PRN
Qty: 60 TABLET | Refills: 0 | Status: SHIPPED | OUTPATIENT
Start: 2024-01-22

## 2024-01-22 NOTE — TELEPHONE ENCOUNTER
Hi, my name's Tian Radha and I'm calling for Doctor José Manuel's office. I have a shoulder problem which she was treating me for and she had prescribed. I guess it was Tramadol pain medicine and it hasn't been working and I'm out of it anyway. And I was I asked last couple of weeks ago about looking into maybe getting something a little stronger or even more of what I've had and I never heard back from anyone. So please give me a call back and let me know what the scoop is. I'll my number is [phone] and it's Tian Radha. And also by the way, my wife as of Friday has two new lungs. She had a double lung transplant and a temple. Thank you. Attila.  You received a voice mail from RADHA FERGUSON.

## 2024-01-22 NOTE — TELEPHONE ENCOUNTER
Spoke to pt. States he takes Plavix but no aspirin.    States his Coumadin is managed by Dr. Santillan.    Advised pt that any hold request for plavix should come to our office but Dr. Santillan's office should receive any hold requests regarding his Coumadin.     Pt verbalized understanding.   yes

## 2024-01-22 NOTE — TELEPHONE ENCOUNTER
S/w pt to verify if he still wants TKA surgery on 4/8 as his pre and post op appts were canceled via [facility].  Pt still wants surgery.  Pre and post op appts rescheduled and all questions answered.

## 2024-01-22 NOTE — TELEPHONE ENCOUNTER
Patient called & needs a call back to discuss concerns about the medications he is on & his upcoming surgery.  Said he is on multiple blood thinners.      I told him the surgeon needs to fax us any hold requests and he has additional concerns.    Please contact patient.

## 2024-01-25 ENCOUNTER — TELEPHONE (OUTPATIENT)
Dept: FAMILY MEDICINE CLINIC | Facility: HOSPITAL | Age: 74
End: 2024-01-25

## 2024-01-25 ENCOUNTER — ANTICOAG VISIT (OUTPATIENT)
Dept: FAMILY MEDICINE CLINIC | Facility: HOSPITAL | Age: 74
End: 2024-01-25

## 2024-01-25 ENCOUNTER — OFFICE VISIT (OUTPATIENT)
Dept: NEPHROLOGY | Facility: HOSPITAL | Age: 74
End: 2024-01-25
Payer: COMMERCIAL

## 2024-01-25 ENCOUNTER — TELEPHONE (OUTPATIENT)
Dept: CARDIOLOGY CLINIC | Facility: CLINIC | Age: 74
End: 2024-01-25

## 2024-01-25 VITALS
SYSTOLIC BLOOD PRESSURE: 110 MMHG | WEIGHT: 247 LBS | DIASTOLIC BLOOD PRESSURE: 82 MMHG | HEIGHT: 71 IN | BODY MASS INDEX: 34.58 KG/M2

## 2024-01-25 DIAGNOSIS — D63.1 ANEMIA DUE TO STAGE 4 CHRONIC KIDNEY DISEASE: ICD-10-CM

## 2024-01-25 DIAGNOSIS — R60.0 LOWER EXTREMITY EDEMA: ICD-10-CM

## 2024-01-25 DIAGNOSIS — N18.32 STAGE 3B CHRONIC KIDNEY DISEASE (CKD) (HCC): Primary | ICD-10-CM

## 2024-01-25 DIAGNOSIS — N18.4 ANEMIA DUE TO STAGE 4 CHRONIC KIDNEY DISEASE: ICD-10-CM

## 2024-01-25 DIAGNOSIS — R80.8 OTHER PROTEINURIA: ICD-10-CM

## 2024-01-25 DIAGNOSIS — E83.39 HYPOPHOSPHATEMIA: ICD-10-CM

## 2024-01-25 DIAGNOSIS — N02.B9 IGA NEPHROPATHY DETERMINED BY BIOPSY OF KIDNEY: ICD-10-CM

## 2024-01-25 DIAGNOSIS — I50.22 CHRONIC SYSTOLIC HEART FAILURE (HCC): ICD-10-CM

## 2024-01-25 PROCEDURE — 99214 OFFICE O/P EST MOD 30 MIN: CPT | Performed by: INTERNAL MEDICINE

## 2024-01-25 NOTE — PATIENT INSTRUCTIONS
1. JANLE d/t IgAN, biopsy proven, in setting of recent MRSA infection. Suspect CKD stage 3b  -renal biopsy performed May 26, 2022 was limited as only 6 intact glomeruli were present for evaluation on light microscopy.  IgA dominant immune complex deposition noted by immunofluorescence.  Differential includes IgA nephropathy both primary and secondary forms versus infectious associated glomerulonephritis.  Staph aureus infections have been associated with IgA dominant immune complex deposition.  Patient did have MRSA surgical site infection so infection associated GN should be considered.  -prednisone 60 mg daily begun June 2nd, 2022. S/p prednisone taper, completed 9/1/22  -monitor CMP, CBC   -if this was a primary IgA nephropathy, it would be compatible with Talbot classification of M0 E1 S0 T1-C1.  -of 38 glomeruli, 6 were intact, forehead global glomerulosclerosis, segmental sclerosis was absent.  Moderate interstitial fibrosis and tubular atrophy as well as arterial intimal fibrosis and mild arterolar hyalinosis were noted  -did not require dialysis inpatient, permacath removed prior to discharge  -sCr 2.1 as of 1/3/24, slowly improving from 5.34 and seems to have stabilized.   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 Thankfully, sCr stable and proteinuria continues to improve  -recommend CKD education - referral placed last visit. Call her when you are ready to do this.     2. Hypophosphatemia - phos 3.2, in setting of JANEL. Resolved off binders.     3. Proteinuria - UpCr in setting of IgAN shows UpCr improved  from 18.8g to 1.5g as of 8/15/22, with latest UpCr 107mg/g as of 1/3/24 and microalb:Cr 20mg/g(improved)  -consider ACEi initiation if UpCr rises above 1g      4. HTN - BP acceptable for age/CKD in office. Continue metoprolol 25mg twice daily, flomax, torsemide 20mg alternating with 30mg daily  -no longer on Cardura 1 mg at bedtime, amlodipine 5 mg daily     5. Anemia ? D/t underlying GN/CKD - Hgb 12.7 as of Jan. 2024, improved, off epogen infusion, off oral iron     6. LE edema likely d/t nephrotic syndrome as well as history of chronic diastolic CHF (grade 2 diastolic dysfunction noted on April 2022 ech)  - continue torsemide as above, will monitor K/SCr. Monitor daily weight/BP readings.     Repeat CMP, UA, UpCr and microalb:Cr, phos and CBC in April 2024.   RTC in 3-4 months.

## 2024-01-25 NOTE — PROGRESS NOTES
LM for pt to call the office back to reschedule appointment.   NEPHROLOGY OUTPATIENT PROGRESS NOTE   Tian Garcia 73 y.o. male MRN: [id number]  DATE: 1/25/2024  Reason for visit:   Chief Complaint   Patient presents with    Chronic Kidney Disease    Follow-up        Patient Instructions   1. JANEL d/t IgAN, biopsy proven, in setting of recent MRSA infection. Suspect CKD stage 3b  -renal biopsy performed May 26, 2022 was limited as only 6 intact glomeruli were present for evaluation on light microscopy.  IgA dominant immune complex deposition noted by immunofluorescence.  Differential includes IgA nephropathy both primary and secondary forms versus infectious associated glomerulonephritis.  Staph aureus infections have been associated with IgA dominant immune complex deposition.  Patient did have MRSA surgical site infection so infection associated GN should be considered.  -prednisone 60 mg daily begun June 2nd, 2022. S/p prednisone taper, completed 9/1/22  -monitor CMP, CBC   -if this was a primary IgA nephropathy, it would be compatible with Florence classification of M0 E1 S0 T1-C1.  -of 38 glomeruli, 6 were intact, forehead global glomerulosclerosis, segmental sclerosis was absent.  Moderate interstitial fibrosis and tubular atrophy as well as arterial intimal fibrosis and mild arterolar hyalinosis were noted  -did not require dialysis inpatient, permacath removed prior to discharge  -sCr 2.1 as of 1/3/24, slowly improving from 5.34 and seems to have stabilized.   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 Thankfully, sCr stable and proteinuria continues to  improve  -recommend CKD education - referral placed last visit. Call her when you are ready to do this.     2. Hypophosphatemia - phos 3.2, in setting of JANEL. Resolved off binders.     3. Proteinuria - UpCr in setting of IgAN shows UpCr improved from 18.8g to 1.5g as of 8/15/22, with latest UpCr 107mg/g as of 1/3/24 and microalb:Cr 20mg/g(improved)  -consider ACEi initiation if UpCr rises above 1g      4. HTN - BP acceptable for age/CKD in office. Continue metoprolol 25mg twice daily, flomax, torsemide 20mg alternating with 30mg daily  -no longer on Cardura 1 mg at bedtime, amlodipine 5 mg daily     5. Anemia ? D/t underlying GN/CKD - Hgb 12.7 as of Jan. 2024, improved, off epogen infusion, off oral iron     6. LE edema likely d/t nephrotic syndrome as well as history of chronic diastolic CHF (grade 2 diastolic dysfunction noted on April 2022 ech)  - continue torsemide as above, will monitor K/SCr. Monitor daily weight/BP readings.     Repeat CMP, UA, UpCr and microalb:Cr, phos and CBC in April 2024.   RTC in 3-4 months.      Tian was seen today for chronic kidney disease and follow-up.    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 JANEL d/t IgAN, biopsy proven, in setting of recent MRSA infection. Suspect CKD stage 3b  -renal biopsy performed May 26, 2022 was limited as only 6 intact glomeruli were present for evaluation on light microscopy.  IgA dominant immune complex deposition noted by immunofluorescence.  Differential includes IgA nephropathy both primary and secondary forms versus infectious associated glomerulonephritis.  Staph aureus infections have been associated with IgA dominant immune complex deposition.  Patient did have MRSA surgical site infection so infection associated GN should be considered.  -prednisone 60 mg daily begun June 2nd, 2022. S/p prednisone taper, completed 9/1/22  -monitor CMP, CBC   -if this was a primary IgA nephropathy, it would be compatible with Miller classification of M0 E1 S0 T1-C1.  -of 38 glomeruli, 6 were intact, forehead global glomerulosclerosis, segmental sclerosis was absent.  Moderate interstitial fibrosis and tubular atrophy as well as arterial intimal fibrosis and mild arterolar hyalinosis were noted  -did not require dialysis inpatient, permacath removed prior to discharge  -sCr 2.1 as of 1/3/24, slowly improving from 5.34 and seems to have stabilized.   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 Thankfully, sCr stable and proteinuria continues to improve  -recommend CKD education - referral placed last visit. Call her when you are ready to do this.     2. Hypophosphatemia  - phos 3.2, in setting of JANEL. Resolved off binders.     3. Proteinuria - UpCr in setting of IgAN shows UpCr improved from 18.8g to 1.5g as of 8/15/22, with latest UpCr 107mg/g as of 1/3/24 and microalb:Cr 20mg/g(improved)  -consider ACEi initiation if UpCr rises above 1g      4. HTN - BP acceptable for age/CKD in office. Continue metoprolol 25mg twice daily, flomax, torsemide 20mg alternating with 30mg daily  -no longer on Cardura 1 mg at bedtime, amlodipine 5 mg daily     5. Anemia ? D/t underlying GN/CKD - Hgb 12.7 as of Jan. 2024, improved, off epogen infusion, off oral iron     6. LE edema likely d/t nephrotic syndrome as well as history of chronic diastolic CHF (grade 2 diastolic dysfunction noted on April 2022 ech)  - continue torsemide as above, will monitor K/SCr. Monitor daily weight/BP readings.     Repeat CMP, UA, UpCr and microalb:Cr, phos and CBC in April 2024.   RTC in 3-4 months.    SUBJECTIVE / INTERVAL HISTORY:  73 y.o. male presents in follow up of CKD.     Tian Garcia denies any recent illness/hospitalizations/medication changes since last office visit.    Denies NSAID use. Uses tramadol for knee pain. Takes tylenol.  No longer receiving epogen infusions.  BP at home well controlled.  Has gained 12 lbs. Was going to cardio rehab and doing well. Has some leg edema at night.  Drinks a lot of water. Has lightheadedness rarely. Not sure if this is due to dehydration.   Was to have knee surgery. Is seeing orthopedic surgery. On coumadin for Factor V Leiden. Now also on plavix.    His wife had a double lung transplant at [place] on Friday. Today is Thursday.     Review of Systems   Constitutional:  Negative for chills and fever.   HENT:  Negative for sore throat.    Eyes:  Negative for visual disturbance.   Respiratory:  Negative for cough and shortness of breath.    Cardiovascular:  Positive for leg swelling. Negative for chest pain.   Gastrointestinal:  Negative for abdominal pain, constipation,  "diarrhea, nausea and vomiting.   Endocrine: Negative for polyuria.   Genitourinary:  Positive for urgency. Negative for decreased urine volume, difficulty urinating, dysuria and hematuria.   Musculoskeletal:  Negative for back pain and myalgias.        Knee pain   Skin:  Negative for rash.   Neurological:  Positive for dizziness (rarely) and numbness (in hands from sleeping). Negative for light-headedness.        Loses balance at times   Psychiatric/Behavioral:  Negative for confusion.        OBJECTIVE:  /82   Ht 5' 11\" (1.803 m)   Wt 112 kg (247 lb)   BMI 34.45 kg/m²  Body mass index is 34.45 kg/m².    Physical exam:  Physical Exam  Vitals reviewed.   Constitutional:       General: He is not in acute distress.     Appearance: Normal appearance. He is well-developed. He is not diaphoretic.   HENT:      Head: Normocephalic and atraumatic.      Nose: Nose normal.      Mouth/Throat:      Mouth: Mucous membranes are moist.   Eyes:      General: No scleral icterus.        Right eye: No discharge.         Left eye: No discharge.   Neck:      Thyroid: No thyromegaly.   Cardiovascular:      Rate and Rhythm: Normal rate and regular rhythm.      Heart sounds: Normal heart sounds.   Pulmonary:      Effort: Pulmonary effort is normal.      Breath sounds: Normal breath sounds. No wheezing or rales.   Abdominal:      General: Bowel sounds are normal. There is no distension.      Palpations: Abdomen is soft.      Tenderness: There is no abdominal tenderness.   Musculoskeletal:         General: Swelling (b/l LE) present. Normal range of motion.      Cervical back: Neck supple.   Lymphadenopathy:      Cervical: No cervical adenopathy.   Skin:     General: Skin is warm and dry.      Findings: No rash.   Neurological:      General: No focal deficit present.      Mental Status: He is alert.      Comments: awake   Psychiatric:         Mood and Affect: Mood normal.         Behavior: Behavior normal. "         Medications:    Current Outpatient Medications:     acetaminophen (TYLENOL) 500 mg tablet, Take 500 mg by mouth if needed for mild pain, Disp: , Rfl:     atorvastatin (LIPITOR) 80 mg tablet, Take 1 tablet (80 mg total) by mouth every evening, Disp: 90 tablet, Rfl: 3    clopidogrel (PLAVIX) 75 mg tablet, Take 1 tablet (75 mg total) by mouth daily, Disp: 90 tablet, Rfl: 3    Diclofenac Sodium (VOLTAREN) 1 %, Apply 2 g topically 4 (four) times a day, Disp: 100 g, Rfl: 3    DULoxetine (CYMBALTA) 60 mg delayed release capsule, TAKE 1 CAPSULE BY MOUTH EVERY DAY, Disp: 90 capsule, Rfl: 1    ezetimibe (ZETIA) 10 mg tablet, TAKE 1 TABLET BY MOUTH EVERY DAY, Disp: 90 tablet, Rfl: 3    gabapentin (NEURONTIN) 300 mg capsule, TAKE 1 CAPSULE BY MOUTH DAILY AT BEDTIME, Disp: 90 capsule, Rfl: 1    metoprolol succinate (TOPROL-XL) 25 mg 24 hr tablet, TAKE 1 TABLET BY MOUTH TWICE A DAY, Disp: 180 tablet, Rfl: 1    mometasone (ELOCON) 0.1 % cream, Apply topically daily, Disp: 45 g, Rfl: 1    potassium chloride (MICRO-K) 10 MEQ CR capsule, TAKE 1 CAPSULE BY MOUTH TWICE A DAY, Disp: 180 capsule, Rfl: 1    tamsulosin (FLOMAX) 0.4 mg, TAKE 1 CAPSULE BY MOUTH DAILY  WITH DINNER, Disp: 90 capsule, Rfl: 1    torsemide (DEMADEX) 20 mg tablet, 30 mg alt with 20 mg daily, Disp: 135 tablet, Rfl: 3    traMADol 100 MG TABS, Take 100 mg by mouth every 12 (twelve) hours as needed for moderate pain, Disp: 60 tablet, Rfl: 0    warfarin (COUMADIN) 5 mg tablet, TAKE ONE-HALF TABLET BY MOUTH  DAILY , EXCEPT TAKE 1 TABLET BY  MOUTH ON WEDNESDAY AND SATURDAY, Disp: 58 tablet, Rfl: 3    folic acid (FOLVITE) 1 mg tablet, TAKE 1 TABLET BY MOUTH DAILY (Patient not taking: Reported on 12/21/2023), Disp: 30 tablet, Rfl: 0    nitroglycerin (NITROSTAT) 0.4 mg SL tablet, Place 1 tablet (0.4 mg total) under the tongue every 5 (five) minutes as needed for chest pain (Patient not taking: Reported on 12/21/2023), Disp: 30 tablet, Rfl: 0    zolpidem (AMBIEN) 10  "mg tablet, TAKE 1 TABLET BY MOUTH ONCE  DAILY AT BEDTIME AS NEEDED FOR  SLEEP, Disp: 90 tablet, Rfl: 0    Allergies:  Allergies as of 01/25/2024 - Reviewed 01/25/2024   Allergen Reaction Noted    Morphine GI Intolerance 09/11/2018    Vitamin k Other (See Comments) 12/28/2020       The following portions of the patient's history were reviewed and updated as appropriate: past family history, past surgical history and problem list.    Laboratory Results:  Lab Results   Component Value Date    SODIUM 140 01/03/2024    K 4.0 01/03/2024    CL 98 01/03/2024    CO2 27 01/03/2024    BUN 35 (H) 01/03/2024    CREATININE 2.10 (H) 01/03/2024    GLUC 117 (H) 01/03/2024    CALCIUM 9.4 05/22/2023        Lab Results   Component Value Date    CALCIUM 9.4 05/22/2023    PHOS 3.6 05/22/2023       Portions of the record may have been created with voice recognition software.  Occasional wrong word or \"sound a like\" substitutions may have occurred due to the inherent limitations of voice recognition software.  Read the chart carefully and recognize, using context, where substitutions have occurred.  "

## 2024-01-25 NOTE — TELEPHONE ENCOUNTER
Fax from Ortho requesting clearance for R TKR scheduled for 4/8/24.    Requesting hold instructions for blood thinner.    Coumadin is managed by Dr. Santillan's office.  Contacted surgical scheduler @ [phone] - they will contact PCP re: Coumadin hold.    Pt also takes Plavix - letter prepped and routed for editing/approval.

## 2024-01-25 NOTE — TELEPHONE ENCOUNTER
Ortho - ROBERT regarding cardiac clearance     Per Dr. Lock -   It's a little premature to clear him for surgery in April  Let's see him in office in March and will handle all of this then     Pt has appt w/ our office March 8th and we will address clearance at that visit.

## 2024-01-28 DIAGNOSIS — G95.9 CERVICAL MYELOPATHY (HCC): ICD-10-CM

## 2024-01-29 RX ORDER — DULOXETIN HYDROCHLORIDE 60 MG/1
CAPSULE, DELAYED RELEASE ORAL
Qty: 90 CAPSULE | Refills: 1 | OUTPATIENT
Start: 2024-01-29

## 2024-02-02 ENCOUNTER — TELEPHONE (OUTPATIENT)
Dept: CARDIOLOGY CLINIC | Facility: CLINIC | Age: 74
End: 2024-02-02

## 2024-02-02 DIAGNOSIS — I25.10 CORONARY ARTERY DISEASE INVOLVING NATIVE CORONARY ARTERY OF NATIVE HEART WITHOUT ANGINA PECTORIS: Chronic | ICD-10-CM

## 2024-02-02 DIAGNOSIS — I25.2 HISTORY OF ST ELEVATION MYOCARDIAL INFARCTION (STEMI): ICD-10-CM

## 2024-02-02 PROBLEM — N17.9 AKI (ACUTE KIDNEY INJURY) (HCC): Status: RESOLVED | Noted: 2022-04-22 | Resolved: 2024-02-02

## 2024-02-02 RX ORDER — ATORVASTATIN CALCIUM 80 MG/1
80 TABLET, FILM COATED ORAL EVERY EVENING
Qty: 90 TABLET | Refills: 0 | Status: SHIPPED | OUTPATIENT
Start: 2024-02-02

## 2024-02-02 RX ORDER — TORSEMIDE 20 MG/1
TABLET ORAL
Qty: 135 TABLET | Refills: 0 | Status: SHIPPED | OUTPATIENT
Start: 2024-02-02

## 2024-02-07 ENCOUNTER — OFFICE VISIT (OUTPATIENT)
Dept: FAMILY MEDICINE CLINIC | Facility: HOSPITAL | Age: 74
End: 2024-02-07
Payer: COMMERCIAL

## 2024-02-07 VITALS
OXYGEN SATURATION: 92 % | WEIGHT: 249 LBS | SYSTOLIC BLOOD PRESSURE: 122 MMHG | HEART RATE: 58 BPM | BODY MASS INDEX: 34.86 KG/M2 | DIASTOLIC BLOOD PRESSURE: 68 MMHG | HEIGHT: 71 IN

## 2024-02-07 DIAGNOSIS — I25.10 CORONARY ARTERY DISEASE INVOLVING NATIVE CORONARY ARTERY OF NATIVE HEART WITHOUT ANGINA PECTORIS: Chronic | ICD-10-CM

## 2024-02-07 DIAGNOSIS — I25.5 ISCHEMIC CARDIOMYOPATHY: ICD-10-CM

## 2024-02-07 DIAGNOSIS — L30.9 ECZEMA, UNSPECIFIED TYPE: ICD-10-CM

## 2024-02-07 DIAGNOSIS — Z79.01 ANTICOAGULATED ON WARFARIN: ICD-10-CM

## 2024-02-07 DIAGNOSIS — M47.12 OTHER SPONDYLOSIS WITH MYELOPATHY, CERVICAL REGION: ICD-10-CM

## 2024-02-07 DIAGNOSIS — I73.9 PERIPHERAL VASCULAR DISEASE, UNSPECIFIED (HCC): ICD-10-CM

## 2024-02-07 DIAGNOSIS — Z00.00 MEDICARE ANNUAL WELLNESS VISIT, SUBSEQUENT: Primary | ICD-10-CM

## 2024-02-07 DIAGNOSIS — I50.22 CHRONIC SYSTOLIC HEART FAILURE (HCC): ICD-10-CM

## 2024-02-07 DIAGNOSIS — I70.0 ATHEROSCLEROSIS OF AORTA (HCC): ICD-10-CM

## 2024-02-07 DIAGNOSIS — I25.2 HISTORY OF ST ELEVATION MYOCARDIAL INFARCTION (STEMI): ICD-10-CM

## 2024-02-07 DIAGNOSIS — I48.0 PAROXYSMAL A-FIB (HCC): ICD-10-CM

## 2024-02-07 PROCEDURE — G0439 PPPS, SUBSEQ VISIT: HCPCS | Performed by: INTERNAL MEDICINE

## 2024-02-07 RX ORDER — METOPROLOL SUCCINATE 25 MG/1
TABLET, EXTENDED RELEASE ORAL
Qty: 180 TABLET | Refills: 1 | Status: SHIPPED | OUTPATIENT
Start: 2024-02-07

## 2024-02-07 RX ORDER — MOMETASONE FUROATE 1 MG/G
CREAM TOPICAL
Qty: 45 G | Refills: 1 | Status: SHIPPED | OUTPATIENT
Start: 2024-02-07

## 2024-02-07 NOTE — ASSESSMENT & PLAN NOTE
Never aware of change when in atrial fib   is on plavix with Dr. Marquez- to see him for upcoming preop- April 8th is surgery date- Dr. Batres in [place]

## 2024-02-07 NOTE — PATIENT INSTRUCTIONS
Medicare Preventive Visit Patient Instructions  Thank you for completing your Welcome to Medicare Visit or Medicare Annual Wellness Visit today. Your next wellness visit will be due in one year (2/7/2025).  The screening/preventive services that you may require over the next 5-10 years are detailed below. Some tests may not apply to you based off risk factors and/or age. Screening tests ordered at today's visit but not completed yet may show as past due. Also, please note that scanned in results may not display below.  Preventive Screenings:  Service Recommendations Previous Testing/Comments   Colorectal Cancer Screening  Colonoscopy    Fecal Occult Blood Test (FOBT)/Fecal Immunochemical Test (FIT)  Fecal DNA/Cologuard Test  Flexible Sigmoidoscopy Age: 45-75 years old   Colonoscopy: every 10 years (May be performed more frequently if at higher risk)  OR  FOBT/FIT: every 1 year  OR  Cologuard: every 3 years  OR  Sigmoidoscopy: every 5 years  Screening may be recommended earlier than age 45 if at higher risk for colorectal cancer. Also, an individualized decision between you and your healthcare provider will decide whether screening between the ages of 76-85 would be appropriate. Colonoscopy: 06/27/2023  FOBT/FIT: 06/27/2023  Cologuard: 06/27/2023  Sigmoidoscopy: 06/27/2023          Prostate Cancer Screening Individualized decision between patient and health care provider in men between ages of 55-69   Medicare will cover every 12 months beginning on the day after your 50th birthday PSA: 1.2 ng/mL           Hepatitis C Screening Once for adults born between 1945 and 1965  More frequently in patients at high risk for Hepatitis C Hep C Antibody: 09/27/2018    Screening Current   Diabetes Screening 1-2 times per year if you're at risk for diabetes or have pre-diabetes Fasting glucose: No results in last 5 years (No results in last 5 years)  A1C: 6.2 % (4/9/2023)  Screening Current   Cholesterol Screening Once every 5  years if you don't have a lipid disorder. May order more often based on risk factors. Lipid panel: 10/10/2023  Screening Not Indicated  History Lipid Disorder      Other Preventive Screenings Covered by Medicare:  Abdominal Aortic Aneurysm (AAA) Screening: covered once if your at risk. You're considered to be at risk if you have a family history of AAA or a male between the age of 65-75 who smoking at least 100 cigarettes in your lifetime.  Lung Cancer Screening: covers low dose CT scan once per year if you meet all of the following conditions: (1) Age 55-77; (2) No signs or symptoms of lung cancer; (3) Current smoker or have quit smoking within the last 15 years; (4) You have a tobacco smoking history of at least 20 pack years (packs per day x number of years you smoked); (5) You get a written order from a healthcare provider.  Glaucoma Screening: covered annually if you're considered high risk: (1) You have diabetes OR (2) Family history of glaucoma OR (3)  aged 50 and older OR (4)  American aged 65 and older  Osteoporosis Screening: covered every 2 years if you meet one of the following conditions: (1) Have a vertebral abnormality; (2) On glucocorticoid therapy for more than 3 months; (3) Have primary hyperparathyroidism; (4) On osteoporosis medications and need to assess response to drug therapy.  HIV Screening: covered annually if you're between the age of 15-65. Also covered annually if you are younger than 15 and older than 65 with risk factors for HIV infection. For pregnant patients, it is covered up to 3 times per pregnancy.    Immunizations:  Immunization Recommendations   Influenza Vaccine Annual influenza vaccination during flu season is recommended for all persons aged >= 6 months who do not have contraindications   Pneumococcal Vaccine   * Pneumococcal conjugate vaccine = PCV13 (Prevnar 13), PCV15 (Vaxneuvance), PCV20 (Prevnar 20)  * Pneumococcal polysaccharide vaccine = PPSV23  (Pneumovax) Adults 19-63 yo with certain risk factors or if 65+ yo  If never received any pneumonia vaccine: recommend Prevnar 20 (PCV20)  Give PCV20 if previously received 1 dose of PCV13 or PPSV23   Hepatitis B Vaccine 3 dose series if at intermediate or high risk (ex: diabetes, end stage renal disease, liver disease)   Respiratory syncytial virus (RSV) Vaccine - COVERED BY MEDICARE PART D  * RSVPreF3 (Arexvy) CDC recommends that adults 60 years of age and older may receive a single dose of RSV vaccine using shared clinical decision-making (SCDM)   Tetanus (Td) Vaccine - COST NOT COVERED BY MEDICARE PART B Following completion of primary series, a booster dose should be given every 10 years to maintain immunity against tetanus. Td may also be given as tetanus wound prophylaxis.   Tdap Vaccine - COST NOT COVERED BY MEDICARE PART B Recommended at least once for all adults. For pregnant patients, recommended with each pregnancy.   Shingles Vaccine (Shingrix) - COST NOT COVERED BY MEDICARE PART B  2 shot series recommended in those 19 years and older who have or will have weakened immune systems or those 50 years and older     Health Maintenance Due:      Topic Date Due   • Colorectal Cancer Screening  06/27/2026   • Hepatitis C Screening  Completed     Immunizations Due:      Topic Date Due   • COVID-19 Vaccine (6 - 2023-24 season) 12/01/2023     Advance Directives   What are advance directives?  Advance directives are legal documents that state your wishes and plans for medical care. These plans are made ahead of time in case you lose your ability to make decisions for yourself. Advance directives can apply to any medical decision, such as the treatments you want, and if you want to donate organs.   What are the types of advance directives?  There are many types of advance directives, and each state has rules about how to use them. You may choose a combination of any of the following:  Living will:  This is a  written record of the treatment you want. You can also choose which treatments you do not want, which to limit, and which to stop at a certain time. This includes surgery, medicine, IV fluid, and tube feedings.   Durable power of  for healthcare (DPAHC):  This is a written record that states who you want to make healthcare choices for you when you are unable to make them for yourself. This person, called a proxy, is usually a family member or a friend. You may choose more than 1 proxy.  Do not resuscitate (DNR) order:  A DNR order is used in case your heart stops beating or you stop breathing. It is a request not to have certain forms of treatment, such as CPR. A DNR order may be included in other types of advance directives.  Medical directive:  This covers the care that you want if you are in a coma, near death, or unable to make decisions for yourself. You can list the treatments you want for each condition. Treatment may include pain medicine, surgery, blood transfusions, dialysis, IV or tube feedings, and a ventilator (breathing machine).  Values history:  This document has questions about your views, beliefs, and how you feel and think about life. This information can help others choose the care that you would choose.  Why are advance directives important?  An advance directive helps you control your care. Although spoken wishes may be used, it is better to have your wishes written down. Spoken wishes can be misunderstood, or not followed. Treatments may be given even if you do not want them. An advance directive may make it easier for your family to make difficult choices about your care.   Weight Management   Why it is important to manage your weight:  Being overweight increases your risk of health conditions such as heart disease, high blood pressure, type 2 diabetes, and certain types of cancer. It can also increase your risk for osteoarthritis, sleep apnea, and other respiratory problems. Aim for  a slow, steady weight loss. Even a small amount of weight loss can lower your risk of health problems.  How to lose weight safely:  A safe and healthy way to lose weight is to eat fewer calories and get regular exercise. You can lose up about 1 pound a week by decreasing the number of calories you eat by 500 calories each day.   Healthy meal plan for weight management:  A healthy meal plan includes a variety of foods, contains fewer calories, and helps you stay healthy. A healthy meal plan includes the following:  Eat whole-grain foods more often.  A healthy meal plan should contain fiber. Fiber is the part of grains, fruits, and vegetables that is not broken down by your body. Whole-grain foods are healthy and provide extra fiber in your diet. Some examples of whole-grain foods are whole-wheat breads and pastas, oatmeal, brown rice, and bulgur.  Eat a variety of vegetables every day.  Include dark, leafy greens such as spinach, kale, jens greens, and mustard greens. Eat yellow and orange vegetables such as carrots, sweet potatoes, and winter squash.   Eat a variety of fruits every day.  Choose fresh or canned fruit (canned in its own juice or light syrup) instead of juice. Fruit juice has very little or no fiber.  Eat low-fat dairy foods.  Drink fat-free (skim) milk or 1% milk. Eat fat-free yogurt and low-fat cottage cheese. Try low-fat cheeses such as mozzarella and other reduced-fat cheeses.  Choose meat and other protein foods that are low in fat.  Choose beans or other legumes such as split peas or lentils. Choose fish, skinless poultry (chicken or turkey), or lean cuts of red meat (beef or pork). Before you cook meat or poultry, cut off any visible fat.   Use less fat and oil.  Try baking foods instead of frying them. Add less fat, such as margarine, sour cream, regular salad dressing and mayonnaise to foods. Eat fewer high-fat foods. Some examples of high-fat foods include french fries, doughnuts, ice  cream, and cakes.  Eat fewer sweets.  Limit foods and drinks that are high in sugar. This includes candy, cookies, regular soda, and sweetened drinks.  Exercise:  Exercise at least 30 minutes per day on most days of the week. Some examples of exercise include walking, biking, dancing, and swimming. You can also fit in more physical activity by taking the stairs instead of the elevator or parking farther away from stores. Ask your healthcare provider about the best exercise plan for you.      © Copyright [employer] 2018 Information is for End User's use only and may not be sold, redistributed or otherwise used for commercial purposes. All illustrations and images included in CareNotes® are the copyrighted property of [employer] or [employer]

## 2024-02-07 NOTE — PROGRESS NOTES
Assessment and Plan:     Problem List Items Addressed This Visit          Cardiovascular and Mediastinum    Paroxysmal A-fib (HCC)     Never aware of change when in atrial fib   is on plavix with Dr. Marquez- to see him for upcoming preop- April 8th is surgery date- Dr. Batres in [place]         Chronic systolic heart failure (HCC)    Coronary artery disease involving native coronary artery of native heart without angina pectoris (Chronic)     Had indigestion in past but not  needed to take nitro         Peripheral vascular disease, unspecified (HCC)     Had pain in knees with walking but improving           Ischemic cardiomyopathy    Atherosclerosis of aorta (HCC)       Nervous and Auditory    Other spondylosis with myelopathy, cervical region     Ongoing neck pain- is better with Tramadoleed            Other    Anticoagulated on warfarin     Other Visit Diagnoses       Medicare annual wellness visit, subsequent    -  Primary            Depression Screening and Follow-up Plan: Patient was screened for depression during today's encounter. They screened negative with a PHQ-9 score of 1.      Preventive health issues were discussed with patient, and age appropriate screening tests were ordered as noted in patient's After Visit Summary.  Personalized health advice and appropriate referrals for health education or preventive services given if needed, as noted in patient's After Visit Summary.     History of Present Illness:     Patient presents for a Medicare Wellness Visit    Here for medicare wellness   Wife had bilateral lung transplant and now leaving [place] to [facility]- no need for additional oxygen- to do 3 hours a day rehab   Will be having surgery April 8 th - cancelled 2x prior       Patient Care Team:  Aneta Santillan DO as PCP - General (Internal Medicine)  DO Ronnie Chavez MD Paul Marion, MD Hina K Trivedi, DO (Nephrology)     Review of Systems:     Review of Systems   Constitutional:   Negative for fever.   HENT:  Negative for congestion.    Respiratory:  Negative for cough and shortness of breath.    All other systems reviewed and are negative.       Problem List:     Patient Active Problem List   Diagnosis    Status post catheter ablation of atrial flutter    WILL (obstructive sleep apnea)    Factor V Leiden mutation ([facility])    Erectile dysfunction    Insomnia    Lumbar herniated disc    Primary osteoarthritis of left knee    Primary osteoarthritis of right knee    Paroxysmal A-fib ([facility])    Lower extremity edema    Mixed hyperlipidemia    History of DVT of lower extremity    IFG (impaired fasting glucose)    Pes anserinus bursitis of right knee    Cervical myelopathy ([facility])    Sinus bradycardia    Goals of care, counseling/discussion    Muscle spasm    Head pain cephalgia    Anemia    Surgical site infection    S/P cervical spinal fusion    Great toe pain, right    Ambulatory dysfunction    Other proteinuria    Glomerulonephritis    Urinary frequency    Microscopic hematuria    Chronic systolic heart failure ([facility])    Hypophosphatemia    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    Anticoagulated on warfarin    Chronic pain of both knees    Other spondylosis with myelopathy, cervical region    Balance disorder    At high risk for injury related to fall    Anemia due to stage 4 chronic kidney disease     Peripheral vascular disease, unspecified ([facility])    Stage 3b chronic kidney disease (CKD) ([facility])    Coronary artery disease involving native coronary artery of native heart without angina pectoris    Ischemic cardiomyopathy    History of ST elevation myocardial infarction (STEMI)    Moderate aortic stenosis    Reactive depression    Atherosclerosis of aorta ([facility])      Past Medical and Surgical History:     Past Medical History:   Diagnosis Date    Acute venous embolism and thrombosis of deep vessels of proximal lower extremity ([facility])     Last assessed: 5/18/15    JANEL (acute kidney injury) ([facility])      Arthritis     Cellulitis     LE    Chronic kidney disease 03/2020    Acute Kidney Injury    CPAP (continuous positive airway pressure) dependence     Factor V Leiden (HCC)     Forgetfulness     Gross hematuria 05/17/2022    Headache(784.0) 03/2022    Never till cervical  spine surgery    Heart murmur 03/2020    Told when in hospital    [place] (hard of hearing)     Hypoalbuminemia 05/11/2022    Other acute osteomyelitis, other site (HCC) 01/03/2023    Paroxysmal atrial fibrillation (HCC)     Last assessed: 11/2/15    Sleep apnea      Past Surgical History:   Procedure Laterality Date    BACK SURGERY      Lower    CARDIAC CATHETERIZATION N/A 04/09/2023    Procedure: Cardiac pci;  Surgeon: Bird Cast MD;  Location: [facility];  Service: Cardiology    CARDIAC CATHETERIZATION N/A 04/09/2023    Procedure: Cardiac Coronary Angiogram;  Surgeon: Bird Cast MD;  Location: [facility];  Service: Cardiology    CATARACT EXTRACTION      COLON SURGERY      COLONOSCOPY      INCISION AND DRAINAGE POSTERIOR SPINE N/A 04/01/2022    Procedure: Posterior cervical evacuation of postoperative collection and debridement with placement of drains C3-T1;  Surgeon: Rajeev Garcia MD;  Location: [facility];  Service: Neurosurgery    IR BIOPSY KIDNEY RANDOM  05/26/2022    IR TUNNELED DIALYSIS CATHETER PLACEMENT  05/23/2022    IR TUNNELED DIALYSIS CATHETER REMOVAL  06/02/2022    TN ARTHRD ANT INTERBODY MIN DSC CRV BELOW C2 N/A 03/11/2022    Procedure: Anterior cervical discectomy and fixation fusion C5/6 and C6/7; Posterior cervical decompression and instrumented fusion C3-T1;  Surgeon: Rajeev Garcia MD;  Location: [facility];  Service: Neurosurgery    RENAL BIOPSY  6/2022    SPINE SURGERY  03/11/2022    Cervical myelopathy/ cervical fusion      Family History:     Family History   Problem Relation Age of Onset    Lung cancer Mother     Hearing loss Father     Emphysema Sister     Heart attack Brother      Atrial fibrillation Brother     Clotting disorder Son       Social History:     Social History     Socioeconomic History    Marital status: /Civil Union     Spouse name: Elsy    Number of children: 4    Years of education: None    Highest education level: None   Occupational History    Occupation: Retired   Tobacco Use    Smoking status: Never    Smokeless tobacco: Never   Vaping Use    Vaping status: Never Used   Substance and Sexual Activity    Alcohol use: Not Currently    Drug use: No    Sexual activity: Not Currently     Partners: Female   Other Topics Concern    None   Social History Narrative    Caffeine use: 2 cups coffee a day     Social Determinants of Health     Financial Resource Strain: Medium Risk (2/7/2024)    Overall Financial Resource Strain (CARDIA)     Difficulty of Paying Living Expenses: Somewhat hard   Food Insecurity: No Food Insecurity (1/3/2023)    Hunger Vital Sign     Worried About Running Out of Food in the Last Year: Never true     Ran Out of Food in the Last Year: Never true   Transportation Needs: No Transportation Needs (2/7/2024)    PRAPARE - Transportation     Lack of Transportation (Medical): No     Lack of Transportation (Non-Medical): No   Physical Activity: Not on file   Stress: Not on file   Social Connections: Not on file   Intimate Partner Violence: Not on file   Housing Stability: Low Risk  (5/11/2022)    Housing Stability Vital Sign     Unable to Pay for Housing in the Last Year: No     Number of Places Lived in the Last Year: 1     Unstable Housing in the Last Year: No      Medications and Allergies:     Current Outpatient Medications   Medication Sig Dispense Refill    acetaminophen (TYLENOL) 500 mg tablet Take 500 mg by mouth if needed for mild pain      atorvastatin (LIPITOR) 80 mg tablet Take 1 tablet (80 mg total) by mouth every evening 90 tablet 0    clopidogrel (PLAVIX) 75 mg tablet Take 1 tablet (75 mg total) by mouth daily 90 tablet 3    Diclofenac  Sodium (VOLTAREN) 1 % Apply 2 g topically 4 (four) times a day 100 g 3    DULoxetine (CYMBALTA) 60 mg delayed release capsule TAKE 1 CAPSULE BY MOUTH EVERY DAY 90 capsule 1    ezetimibe (ZETIA) 10 mg tablet TAKE 1 TABLET BY MOUTH EVERY DAY 90 tablet 3    gabapentin (NEURONTIN) 300 mg capsule TAKE 1 CAPSULE BY MOUTH DAILY AT BEDTIME 90 capsule 1    metoprolol succinate (TOPROL-XL) 25 mg 24 hr tablet TAKE 1 TABLET BY MOUTH TWICE A  tablet 1    mometasone (ELOCON) 0.1 % cream APPLY TO AFFECTED AREA TOPICALLY EVERY DAY 45 g 1    potassium chloride (MICRO-K) 10 MEQ CR capsule TAKE 1 CAPSULE BY MOUTH TWICE A  capsule 1    tamsulosin (FLOMAX) 0.4 mg TAKE 1 CAPSULE BY MOUTH DAILY  WITH DINNER 90 capsule 1    torsemide (DEMADEX) 20 mg tablet 30 mg alt with 20 mg daily 135 tablet 0    traMADol 100 MG TABS Take 100 mg by mouth every 12 (twelve) hours as needed for moderate pain 60 tablet 0    warfarin (COUMADIN) 5 mg tablet TAKE ONE-HALF TABLET BY MOUTH  DAILY , EXCEPT TAKE 1 TABLET BY  MOUTH ON WEDNESDAY AND SATURDAY 58 tablet 3    zolpidem (AMBIEN) 10 mg tablet TAKE 1 TABLET BY MOUTH ONCE  DAILY AT BEDTIME AS NEEDED FOR  SLEEP 90 tablet 0    folic acid (FOLVITE) 1 mg tablet TAKE 1 TABLET BY MOUTH DAILY (Patient not taking: Reported on 12/21/2023) 30 tablet 0    nitroglycerin (NITROSTAT) 0.4 mg SL tablet Place 1 tablet (0.4 mg total) under the tongue every 5 (five) minutes as needed for chest pain (Patient not taking: Reported on 12/21/2023) 30 tablet 0     No current facility-administered medications for this visit.     Allergies   Allergen Reactions    Morphine GI Intolerance    Vitamin K Other (See Comments)     On coumadin-patient sensitive to vitamin K amounts in meds etc.      Immunizations:     Immunization History   Administered Date(s) Administered    COVID-19 MODERNA VACC 0.5 ML IM 03/03/2021, 03/31/2021, 12/10/2021    COVID-19 Moderna Vac BIVALENT 12 Yr+ IM 0.5 ML 10/24/2022    COVID-19 Moderna mRNA  Vaccine 12 Yr+ 50 mcg/0.5 mL (Spikevax) 10/06/2023    INFLUENZA 10/08/2014, 09/13/2018, 10/24/2022    Influenza Quadrivalent Preservative Free 3 years and older IM 10/08/2015, 11/03/2016    Influenza Split High Dose Preservative Free IM 10/30/2017    Influenza, high dose seasonal 0.7 mL 09/13/2018, 10/24/2019, 10/27/2020, 11/04/2021, 09/19/2023    Pneumococcal Conjugate 13-Valent 02/09/2016    Pneumococcal Polysaccharide PPV23 04/24/2017    Tdap 03/10/2014      Health Maintenance:         Topic Date Due    Colorectal Cancer Screening  06/27/2026    Hepatitis C Screening  Completed         Topic Date Due    COVID-19 Vaccine (6 - 2023-24 season) 12/01/2023      Medicare Screening Tests and Risk Assessments:     Tian is here for his Subsequent Wellness visit.     Health Risk Assessment:   Patient rates overall health as fair. Patient feels that their physical health rating is much better. Patient is satisfied with their life. Eyesight was rated as same. Hearing was rated as same. Patient feels that their emotional and mental health rating is slightly better. Patients states they are never, rarely angry. Patient states they are sometimes unusually tired/fatigued. Pain experienced in the last 7 days has been some. Patient's pain rating has been 6/10. Patient states that he has experienced no weight loss or gain in last 6 months.     Depression Screening:   PHQ-9 Score: 1      Fall Risk Screening:   In the past year, patient has experienced: no history of falling in past year      Home Safety:  Patient has trouble with stairs inside or outside of their home. Patient has working smoke alarms and has no working carbon monoxide detector. Home safety hazards include: none.     Nutrition:   Current diet is No Added Salt and Low Saturated Fat.     Medications:   Patient is currently taking over-the-counter supplements. OTC medications include: see medication list. Patient is able to manage medications.     Activities of Daily  Living (ADLs)/Instrumental Activities of Daily Living (IADLs):   Walk and transfer into and out of bed and chair?: Yes  Dress and groom yourself?: Yes    Bathe or shower yourself?: Yes    Feed yourself? Yes  Do your laundry/housekeeping?: Yes  Manage your money, pay your bills and track your expenses?: Yes  Make your own meals?: Yes    Do your own shopping?: Yes    Previous Hospitalizations:   Any hospitalizations or ED visits within the last 12 months?: Yes    How many hospitalizations have you had in the last year?: 1-2    Advance Care Planning:   Living will: Yes    Durable POA for healthcare: Yes    Advanced directive: Yes    Advanced directive counseling given: Yes    End of Life Decisions reviewed with patient: Yes    Provider agrees with end of life decisions: Yes      Comments: Wife is medical poa   Full code but if no hope for recovery    Cognitive Screening:   Provider or family/friend/caregiver concerned regarding cognition?: No    PREVENTIVE SCREENINGS      Cardiovascular Screening:    General: Screening Not Indicated and History Lipid Disorder      Diabetes Screening:     General: Screening Current      Colorectal Cancer Screening:     General: Risks and Benefits Discussed      Prostate Cancer Screening:    General: Risks and Benefits Discussed      Osteoporosis Screening:    General: Screening Current      Abdominal Aortic Aneurysm (AAA) Screening:    Risk factors include: age between 65-74 yo        Lung Cancer Screening:     General: Screening Not Indicated      Hepatitis C Screening:    General: Screening Current    Screening, Brief Intervention, and Referral to Treatment (SBIRT)    Screening      AUDIT-C Screenin) How often did you have a drink containing alcohol in the past year? never  2) How many drinks did you have on a typical day when you were drinking in the past year? 0  3) How often did you have 6 or more drinks on one occasion in the past year? never    AUDIT-C Score:  "0  Interpretation: Score 0-3 (male): Negative screen for alcohol misuse    Single Item Drug Screening:  How often have you used an illegal drug (including marijuana) or a prescription medication for non-medical reasons in the past year? never    Single Item Drug Screen Score: 0  Interpretation: Negative screen for possible drug use disorder    Other Counseling Topics:   Regular weightbearing exercise and calcium and vitamin D intake.     No results found.     Physical Exam:     /68   Pulse 58   Ht 5' 11\" (1.803 m)   Wt 113 kg (249 lb)   SpO2 92%   BMI 34.73 kg/m²     Physical Exam  Vitals reviewed.   Constitutional:       General: He is not in acute distress.     Appearance: He is not diaphoretic.   HENT:      Right Ear: Tympanic membrane normal. There is no impacted cerumen.      Left Ear: Tympanic membrane normal. There is no impacted cerumen.      Nose: No congestion.      Mouth/Throat:      Pharynx: No posterior oropharyngeal erythema.   Eyes:      General:         Right eye: No discharge.   Cardiovascular:      Rate and Rhythm: Normal rate and regular rhythm.      Heart sounds:      No gallop.   Pulmonary:      Breath sounds: No wheezing or rhonchi.   Abdominal:      General: There is no distension.      Tenderness: There is no rebound.   Musculoskeletal:         General: Tenderness present. No swelling.      Comments: Improved room in arms  Ongoing knee pain bilaterally- using cane   Lymphadenopathy:      Cervical: No cervical adenopathy.   Neurological:      Mental Status: He is alert.          Aneta Fly, DO  "

## 2024-02-14 ENCOUNTER — TELEPHONE (OUTPATIENT)
Dept: NEPHROLOGY | Facility: CLINIC | Age: 74
End: 2024-02-14

## 2024-02-14 NOTE — TELEPHONE ENCOUNTER
Pt had 5/28/24 appt with Dr. Covarrubias in the [facility] that needed to be rescheduled due to provider not being in the office. Pt rescheduled to 5/29/24.

## 2024-02-15 ENCOUNTER — TELEPHONE (OUTPATIENT)
Dept: FAMILY MEDICINE CLINIC | Facility: HOSPITAL | Age: 74
End: 2024-02-15

## 2024-02-15 DIAGNOSIS — R73.01 IFG (IMPAIRED FASTING GLUCOSE): Primary | ICD-10-CM

## 2024-02-15 NOTE — TELEPHONE ENCOUNTER
Patient states that insurance requires orders for these meds before they will state if they are covered or not    Mounjaro    Ozempic    Gepbound    He wants to take this because he is prediabetic and it could help him lose weight    PCB

## 2024-02-19 DIAGNOSIS — G95.9 CERVICAL MYELOPATHY (HCC): ICD-10-CM

## 2024-02-20 RX ORDER — DULOXETIN HYDROCHLORIDE 60 MG/1
CAPSULE, DELAYED RELEASE ORAL
Qty: 90 CAPSULE | Refills: 1 | Status: SHIPPED | OUTPATIENT
Start: 2024-02-20

## 2024-02-27 ENCOUNTER — TELEPHONE (OUTPATIENT)
Dept: FAMILY MEDICINE CLINIC | Facility: HOSPITAL | Age: 74
End: 2024-02-27

## 2024-02-27 LAB
ALBUMIN SERPL-MCNC: 4.3 G/DL (ref 3.8–4.8)
ALBUMIN/CREAT UR: 9 MG/G CREAT (ref 0–29)
ALBUMIN/GLOB SERPL: 1.8 {RATIO} (ref 1.2–2.2)
ALP SERPL-CCNC: 83 IU/L (ref 44–121)
ALT SERPL-CCNC: 11 IU/L (ref 0–44)
APPEARANCE UR: CLEAR
AST SERPL-CCNC: 13 IU/L (ref 0–40)
BACTERIA URNS QL MICRO: NORMAL
BILIRUB SERPL-MCNC: 0.6 MG/DL (ref 0–1.2)
BILIRUB UR QL STRIP: NEGATIVE
BUN SERPL-MCNC: 40 MG/DL (ref 8–27)
BUN/CREAT SERPL: 18 (ref 10–24)
CALCIUM SERPL-MCNC: 9.4 MG/DL (ref 8.6–10.2)
CASTS URNS QL MICRO: NORMAL /LPF
CHLORIDE SERPL-SCNC: 100 MMOL/L (ref 96–106)
CO2 SERPL-SCNC: 25 MMOL/L (ref 20–29)
COLOR UR: YELLOW
CREAT SERPL-MCNC: 2.17 MG/DL (ref 0.76–1.27)
CREAT UR-MCNC: 114.3 MG/DL
EGFR: 31 ML/MIN/1.73
EPI CELLS #/AREA URNS HPF: NORMAL /HPF (ref 0–10)
GLOBULIN SER-MCNC: 2.4 G/DL (ref 1.5–4.5)
GLUCOSE SERPL-MCNC: 118 MG/DL (ref 70–99)
GLUCOSE UR QL: NEGATIVE
HGB UR QL STRIP: NEGATIVE
KETONES UR QL STRIP: NEGATIVE
LEUKOCYTE ESTERASE UR QL STRIP: NEGATIVE
MICRO URNS: NORMAL
MICRO URNS: NORMAL
MICROALBUMIN UR-MCNC: 10.5 UG/ML
NITRITE UR QL STRIP: NEGATIVE
PH UR STRIP: 6 [PH] (ref 5–7.5)
POTASSIUM SERPL-SCNC: 4.2 MMOL/L (ref 3.5–5.2)
PROT SERPL-MCNC: 6.7 G/DL (ref 6–8.5)
PROT UR QL STRIP: NORMAL
PROT UR-MCNC: 10.7 MG/DL
PROT/CREAT UR: 94 MG/G CREAT (ref 0–200)
RBC #/AREA URNS HPF: NORMAL /HPF (ref 0–2)
SODIUM SERPL-SCNC: 139 MMOL/L (ref 134–144)
SP GR UR: 1.02 (ref 1–1.03)
UROBILINOGEN UR STRIP-ACNC: 0.2 MG/DL (ref 0.2–1)
WBC #/AREA URNS HPF: NORMAL /HPF (ref 0–5)

## 2024-02-28 ENCOUNTER — ANTICOAG VISIT (OUTPATIENT)
Dept: FAMILY MEDICINE CLINIC | Facility: HOSPITAL | Age: 74
End: 2024-02-28

## 2024-02-28 ENCOUNTER — OFFICE VISIT (OUTPATIENT)
Dept: CARDIOLOGY CLINIC | Facility: CLINIC | Age: 74
End: 2024-02-28
Payer: COMMERCIAL

## 2024-02-28 VITALS
WEIGHT: 252.3 LBS | DIASTOLIC BLOOD PRESSURE: 76 MMHG | SYSTOLIC BLOOD PRESSURE: 120 MMHG | HEART RATE: 52 BPM | HEIGHT: 71 IN | BODY MASS INDEX: 35.32 KG/M2

## 2024-02-28 DIAGNOSIS — N18.4 ANEMIA DUE TO STAGE 4 CHRONIC KIDNEY DISEASE: ICD-10-CM

## 2024-02-28 DIAGNOSIS — R73.01 IFG (IMPAIRED FASTING GLUCOSE): Primary | ICD-10-CM

## 2024-02-28 DIAGNOSIS — I35.0 MODERATE AORTIC STENOSIS: ICD-10-CM

## 2024-02-28 DIAGNOSIS — Z01.810 PREOP CARDIOVASCULAR EXAM: Primary | ICD-10-CM

## 2024-02-28 DIAGNOSIS — I25.10 ATHEROSCLEROSIS OF NATIVE CORONARY ARTERY OF NATIVE HEART WITHOUT ANGINA PECTORIS: ICD-10-CM

## 2024-02-28 DIAGNOSIS — D63.1 ANEMIA DUE TO STAGE 4 CHRONIC KIDNEY DISEASE: ICD-10-CM

## 2024-02-28 DIAGNOSIS — I48.0 PAROXYSMAL A-FIB (HCC): ICD-10-CM

## 2024-02-28 PROBLEM — I50.22 CHRONIC SYSTOLIC HEART FAILURE (HCC): Status: RESOLVED | Noted: 2022-05-10 | Resolved: 2024-02-28

## 2024-02-28 PROCEDURE — 93000 ELECTROCARDIOGRAM COMPLETE: CPT | Performed by: INTERNAL MEDICINE

## 2024-02-28 PROCEDURE — 99214 OFFICE O/P EST MOD 30 MIN: CPT | Performed by: INTERNAL MEDICINE

## 2024-02-28 RX ORDER — BLOOD SUGAR DIAGNOSTIC
STRIP MISCELLANEOUS
Qty: 100 STRIP | Refills: 1 | Status: SHIPPED | OUTPATIENT
Start: 2024-02-28

## 2024-02-28 NOTE — PATIENT INSTRUCTIONS
You are on your last fill of clopidogrel, it should run out near end of March. Once it runs out, stop taking it. We will not refill. At that time, start taking aspirin 81 mg daily.   Aspirin does not need to be held for your knee surgery.

## 2024-02-28 NOTE — LETTER
Cardiology Pre Operative Clearance      PRE OPERATIVE CARDIAC RISK ASSESSMENT    02/28/24    Tian Garcia  1950  [id number]    Case: [id number] Date/Time: 04/08/24 0730   Procedure: ARTHROPLASTY KNEE TOTAL and all associated procedures (Right: Knee)   Anesthesia type: Choice   Diagnosis: Primary osteoarthritis of right knee [M17.11]   Pre-op diagnosis: Primary osteoarthritis of right knee [M17.11]   Location: AN OR ROOM 01 / [facility]   Surgeons: Dot Galindo MD       No Cardiac Contraindication for Planned Surgical Procedures    Anticoagulation: yes  Patient will transition clopidogrel to aspirin 81 mg daily > 1 week prior to surgery. Do not hold aspirin.   Hold warfarin 5 days pre-operatively. Bridging with LMWH or UFH not recommended. Resume warfarin as soon as possible post-operatively.     Physician Comment: Low risk of cardiac complications with planned surgery.     Electronically Signed: Seamus Lock MD

## 2024-02-28 NOTE — PROGRESS NOTES
Cardiology Outpatient Follow-Up Note - Tian Garcia 74 y.o. male MRN: [id number]      Assessment/Plan:      1. Preop cardiovascular exam  TKA surgery now scheduled 12 months post STEMI  No cardiac contraindication. Low cardiac risk for surgery.  Plavix treatment completing end of March 2024. Patient will transition to aspirin 81 mg daily at that time and continue aspirin 81 mg daily through surgery.   Warfarin managed by PCP. My recommendation is warfarin hold 5 days pre-op without bridge. His [id number] is 4 (age, HTN, CAD). Resume warfarin as soon as possible post-op.   - POCT ECG    2. Atherosclerosis of native coronary artery of native heart without angina pectoris  Continue clopidogrel 75 mg daily until end of March 2024. Then, stop clopidogrel and start aspirin 81 mg daily  Continue warfarin  Continue beta metoprolol XL 25 mg daily    3. Paroxysmal A-fib (HCC)  In sinus rhythm today  Continue metoprolol XL 25 mg daily  Continue warfarin with target INR 2.5 (2-3).     4. Anemia due to stage 4 chronic kidney disease   Noted, last Hgb was 12.7 g/dL .     5. Moderate aortic stenosis  We will check a repeat echo in 6 months.   - Echo complete w/ contrast if indicated; Future        We will see Tian Garcia back in 6 month for routine follow-up.    Subjective:     HPI: Tian Garcia is a 74 y.o. year old male  with paroxysmal atrial fibrillation, HFpEF, hypertension, moderate aortic stenosis, CKD 4, CAD and STEMI April 2023 with PCI to the proximal to mid LAD.  Subsequently, developed ischemic cardiomyopathy with LVEF of 30 to 35 and has since recovered to 60-65%.  He presents today for follow-up.    No cardiac complaints today        Cardiac Testing:      Cardiac Cath 4/9/23 Impression         Successful PCI w/ non-overlapping ROBERT x2 of sequential prox & mid LAD culprit lesions    Residual mid RCA 80% diffuse lesion    Echo 4/10/23 Interpretation Summary         Left Ventricle: Left ventricular cavity size  is normal. Wall thickness is moderately increased. There is moderate concentric hypertrophy. The left ventricular ejection fraction is 30-35%. Global longitudinal strain is reduced at -6%. There is moderate global hypokinesis with severe hypokinesis of the mid anterior, mid anteroseptal, mid anterolateral, and all apical segments. There is regional variation of the inferior wall. Diastolic function is moderately abnormal, consistent with grade II (pseudonormal) relaxation.    Right Ventricle: Wall motion is abnormal. There is severe hypokinesis of the apical free wall.    Mitral Valve: There is mild regurgitation.    Aortic Valve: There is moderate stenosis. The aortic valve velocity is increased due to stenosis but lower than expected due to the presence of decreased flow.       Echo 6/9/23  Interpretation Summary     Left Ventricle: Left ventricular cavity size is normal. The left ventricular ejection fraction is 55-60% by biplane measurement. Systolic function is normal.    The following segments are hypokinetic: basal inferior.    All other segments are normal.    Aortic Valve: There is moderate stenosis.     Marked recovery of LV systolic function compared to prior study dated 04/10/2022.          EKGs, personally reviewed:  EKG in the office 9/28/2023 shows sinus bradycardia, 58 bpm, normal axis,  ms RBBB, otherwise normal intervals.  Abnormal study.    EKG 2/28/24 - sinus bradycardia, 52 bpm, normal axis, RBBB  ms. Abnormal study.     Relevant Labs & Results:  CMP 5/4/23 --  Cr 2.5, K 4.1  Lipid panel 4/9/23 -- , , HDL 45,  mg/dL -- on pravastatin 40 mg   LDL direct 4/9/23 -- 139 mg/dL     CMP 2/26/24 - Cr 2.17, GFR 31, K 4.2  Lipidp pancho 10/10/23 - LDL 90 mg/dL   CBC 1/3/24 - Hgb 12.7 g/dL      ROS:  Review of Systems:  Review of Systems    Objective:     Vitals:   There were no vitals filed for this visit.   There is no height or weight on file to calculate BSA.  Wt Readings  from Last 3 Encounters:   02/07/24 113 kg (249 lb)   01/25/24 112 kg (247 lb)   12/21/23 110 kg (242 lb)       Physical Exam:    General: Tian Garcia is a well appearing male, in no acute distress, sitting comfortably  HEENT: moist mucous membranes, EOMI  Neck:  No JVD, supple, trachea midline  Cardiovascular: unremarkable S1/S2, regular rate and rhythm, 2/6 SM RUSB  Pulmonary: normal respiratory effort, CTAB  Abdomen: soft and nondistended  Extremities: trace lower extremity edema. Warm and well perfused extremities.  Neuro: no focal motor deficits, AAOx3 (person, place, time)  Psych: Normal mood and affect, cooperative      Medications (at the START of this encounter):  Outpatient Medications Prior to Visit   Medication Sig Dispense Refill    glucose blood (Accu-Chek Guide) test strip Use one new strip daily dx r73.01 100 strip 1    acetaminophen (TYLENOL) 500 mg tablet Take 500 mg by mouth if needed for mild pain      atorvastatin (LIPITOR) 80 mg tablet Take 1 tablet (80 mg total) by mouth every evening 90 tablet 0    clopidogrel (PLAVIX) 75 mg tablet Take 1 tablet (75 mg total) by mouth daily 90 tablet 3    Diclofenac Sodium (VOLTAREN) 1 % Apply 2 g topically 4 (four) times a day 100 g 3    DULoxetine (CYMBALTA) 60 mg delayed release capsule TAKE 1 CAPSULE BY MOUTH EVERY DAY 90 capsule 1    ezetimibe (ZETIA) 10 mg tablet TAKE 1 TABLET BY MOUTH EVERY DAY 90 tablet 3    folic acid (FOLVITE) 1 mg tablet TAKE 1 TABLET BY MOUTH DAILY (Patient not taking: Reported on 12/21/2023) 30 tablet 0    gabapentin (NEURONTIN) 300 mg capsule TAKE 1 CAPSULE BY MOUTH DAILY AT BEDTIME 90 capsule 1    metoprolol succinate (TOPROL-XL) 25 mg 24 hr tablet TAKE 1 TABLET BY MOUTH TWICE A  tablet 1    mometasone (ELOCON) 0.1 % cream APPLY TO AFFECTED AREA TOPICALLY EVERY DAY 45 g 1    nitroglycerin (NITROSTAT) 0.4 mg SL tablet Place 1 tablet (0.4 mg total) under the tongue every 5 (five) minutes as needed for chest pain (Patient  "not taking: Reported on 12/21/2023) 30 tablet 0    potassium chloride (MICRO-K) 10 MEQ CR capsule TAKE 1 CAPSULE BY MOUTH TWICE A  capsule 1    tamsulosin (FLOMAX) 0.4 mg TAKE 1 CAPSULE BY MOUTH DAILY  WITH DINNER 90 capsule 1    tirzepatide (Mounjaro) 2.5 MG/0.5ML Inject 0.5 mL (2.5 mg total) under the skin every 7 days 2 mL 0    torsemide (DEMADEX) 20 mg tablet 30 mg alt with 20 mg daily 135 tablet 0    traMADol 100 MG TABS Take 100 mg by mouth every 12 (twelve) hours as needed for moderate pain 60 tablet 0    warfarin (COUMADIN) 5 mg tablet TAKE ONE-HALF TABLET BY MOUTH  DAILY , EXCEPT TAKE 1 TABLET BY  MOUTH ON WEDNESDAY AND SATURDAY 58 tablet 3    zolpidem (AMBIEN) 10 mg tablet TAKE 1 TABLET BY MOUTH ONCE  DAILY AT BEDTIME AS NEEDED FOR  SLEEP 90 tablet 0     No facility-administered medications prior to visit.                 Time Spent:  Total time (face-to-face and non-face-to-face) spent on today's visit was 24 minutes. This includes preparation for the visits (i.e. reviewing test results), performance of a medically appropriate history and examination, and orders for medications, tests or other procedures. This time is exclusive of procedures performed and time spent teaching.        This note was completed in part utilizing Dragon [employer] One voice recognition software. Grammatical errors, random word insertion, spelling mistakes, occasional wrong word or \"sound-alike\" substitutions and incomplete sentences may be an occasional consequence of the system secondary to software limitations, ambient noise and hardware issues. At the time of dictation, efforts were made to edit, clarify and /or correct errors.  Please read the chart carefully and recognize, using context, where substitutions have occurred.  If you have any questions or concerns about the context, text or information contained within the body of this dictation, please contact myself, the provider, for further clarification.    "

## 2024-03-03 DIAGNOSIS — I25.10 CORONARY ARTERY DISEASE INVOLVING NATIVE CORONARY ARTERY OF NATIVE HEART WITHOUT ANGINA PECTORIS: Chronic | ICD-10-CM

## 2024-03-03 DIAGNOSIS — I25.2 HISTORY OF ST ELEVATION MYOCARDIAL INFARCTION (STEMI): ICD-10-CM

## 2024-03-04 ENCOUNTER — TELEPHONE (OUTPATIENT)
Dept: OBGYN CLINIC | Facility: HOSPITAL | Age: 74
End: 2024-03-04

## 2024-03-04 DIAGNOSIS — R73.01 IFG (IMPAIRED FASTING GLUCOSE): ICD-10-CM

## 2024-03-04 RX ORDER — POTASSIUM CHLORIDE 750 MG/1
10 CAPSULE, EXTENDED RELEASE ORAL 2 TIMES DAILY
Qty: 180 CAPSULE | Refills: 1 | Status: SHIPPED | OUTPATIENT
Start: 2024-03-04

## 2024-03-04 NOTE — TELEPHONE ENCOUNTER
Preoperative Elective Admission Assessment- spoke to the patient.     Living Situation: Pt reports living at home with his wifeElsy     Home Layout: Pt reports living in a multilevel home with first floor walk in shower, denies shower seat or grab bars. Can use lawn chair as he had in the past.                      Steps: 4 to enter with double rail.     First Floor Setup: Pt reports planning to sleep in entry level living room with full bathroom with walk in shower.      Post-op Caregiver: WifeElsy or local son and daughter in law.     Post-op Transport: WifeElsy or local son and daughter in law.     Outpatient Physical Therapy Site: [facility]- postop scheduled.      DME:Pt reports having cane, RW and BSC     Patient's Current Level of Function: Pt reports being independent with ADLS and Ambulating with RW.     Medication Management: Pt self manages daily medications removing them daily from a pillbox.                      Preferred Pharmacy:[facility]                      Blood Management Vitamins: Starting this week the folic acid, vitamin C, and Iron daily.                      Post-op anticoagulant:TBD     DC Plan: Pt educated that our goal, if at all possible, is to appropriately discharge patient based off their post-op function while striving to maintain maximal independence. If possible, the goal is to discharge patient to home and for them to attend outpatient physical therapy.                     Barriers to DC identified preoperatively: None     BMI: 35.19     Patient Education: Pt educated on post op pain, early mobilization (POD0), indication/use of incentive spirometer (10x/hr while awake), and indication for/use of foot/leg pumps. pt encouraged to call me with questions, concerns or issues.

## 2024-03-05 RX ORDER — TORSEMIDE 20 MG/1
TABLET ORAL
Qty: 135 TABLET | Refills: 0 | Status: SHIPPED | OUTPATIENT
Start: 2024-03-05

## 2024-03-09 NOTE — ASSESSMENT & PLAN NOTE
Continue levothyroxine   Pt here for neuro follow up  Overall pt doing well  No new sxs currently  Pt notes no issues currently with judgement, directions, cooking or finances  Pt just got hearing aides this week and has already noticed improvement in attention to detail  Pt memory labs good  Re rev updated mri head  Pt neuro and cognitive exam stable at this time  Will continue to follow clinically  To consider neurocogntive eval if any worsening of sxs  Pt to call for any concerns  No recommendation for memory medication at this time

## 2024-03-11 NOTE — PROGRESS NOTES
Internal Medicine Pre-Operative Evaluation:     Reason for Visit: Pre-operative Evaluation for Risk Stratification and Optimization    Patient ID: Tian Garcia is a 74 y.o. male.     Surgery: Arthroplasty of right knee  Referring Provider: Dr Galindo      Recommendations to Proceed withSurgery    Patient is considered to be Medium risk for Medium risk procedure.     After evaluation and discussion with patient with emphasis that all surgery has some degree of inherent risk it is determined this procedure is of acceptable risk  medically.    Patient may proceed with planned procedure      Assessment    Pre-operative Medical Evaluation for planned surgery  Recommendations as listed in PLAN section below  Contact surgical nurse  navigator with any questions regarding preoperative plan or schedule.      Problem List Items Addressed This Visit          Unprioritized    Factor V Leiden mutation ([facility])     Hold warfarin x 5 days prior to surgery  Should be bridged with lovenox  Presurgery anticougulation plan as follows:  Stop Warfarin on 4/3/2024  Start Lovenox injection daily on 4/5/2024 and continue through and including morning 4/7/2024  Resume Warfarin the evening of surgery  Start Lovenox injection daily on 4/9/2024 and continue through 4/12/2024 or until PT/INR ( coumadin level) is 2.0 or greater as proven by post surgery lab test with monitoring by your PCP or surgical team         Primary osteoarthritis of right knee     Failed outpatient conservative measures  Electing to undergo arthroplasty           Paroxysmal A-fib ([facility])     Hold warfarin as instructed  Continue metoprolol as prescribed         History of DVT of lower extremity     Takes warfarin as above         Stage 3b chronic kidney disease (CKD) ([facility])     Lab Results   Component Value Date    EGFR 31 (L) 02/26/2024    EGFR 33 (L) 01/03/2024    EGFR 32 (L) 01/03/2024    CREATININE 2.17 (H) 02/26/2024    CREATININE 2.10 (H) 01/03/2024    CREATININE  2.16 (H) 01/03/2024   Level 3b  Baseline as above  F/b nephrology will see them next week before surgery  Avoid nephrotoxic medications  Avoid hypotension in the post operative setting  Monitor bmp             History of ST elevation myocardial infarction (STEMI)     ROBERT x2 to prox /mid LAD 2023; 80% mid RCA disease  Cont ASA throughout perioperative period per cardiology recommendations  Cleared 2/28/24         Moderate aortic stenosis     Last echo ef 55-60%/moderate AS  Cleared by cards  Monitor volume status carefully          Other Visit Diagnoses       Preoperative clearance    -  Primary                 Plan:     1. Further preoperative workup as follows:   - none no further testing required may proceed with surgery    2. Medication Management/Recommendations:   - Insulin Management: hold mounjaro as directed by PAT  - Warfarin management: Discontinue warfarin 5 days before surgery and resume POD 0 and use lovenox bridge pre and post op  - avoid use of NSAID such as motrin, advil, aleve for 7 days prior to surgery  - hold all OTC herbal or nutritional supplements 7 days before surgery    3. Patient requires further consultation with:   cardiology nephrology    4. Discharge Planning / Barriers to Discharge  none identified - patients has post discharge therapy plan in place, transportation arranged for discharge day, adequate family support at home to assist with discharge to home.        Subjective:           History of Present Illness:     Tian Garcia is a 74 y.o. male who presents to the office today for a preoperative consultation at the request of surgeon. The patient understands this is an elective procedure and not emergent. They are electing to undergo planned procedure with an understanding that all surgery has inherent risk. They have worked with their surgeon and failed conservative treatment measures. Today they present for preoperative risk assessment and recommendations for optimization in  "preparation for surgery.    Pt seen in surgical optimization center for upcoming proposed surgery. They have failed previous conservative measures and have elected surgical intervention.     Pt meets presurgical lab and BMI optimization goals, except Current GFR is <60. We will hold all nephrotoxic medications and monitor bmp closely in the post operative setting avoiding hypotension if at all possible. Pt is followed by nephrology.       Upon interview questioning patient is able to perform greater than 4 METs workload in daily life without any reported cardio-pulmonary symptoms. Pt has a h/o PAF/Aflutter and takes warfarin d/t same as well as Factor V deficiency. See pre-post sx AC plan as above and in AVS. He also has a h/o CAD, s/p catheterization 2023 which included ROBERT x2 to prox/mid LAD, 80% mid RCA, he was cleared by CARDIOLOGY WHO RECOMMENDS CONTINUING ASA THROUGHOUT THE PERIOPERATIVE PERIOD.            ROS: No TIA's or unusual headaches, no dysphagia.  No prolonged cough. No dyspnea or chest pain on exertion.  No abdominal pain, change in bowel habits, black or bloody stools.  No urinary tract or BPH symptoms.  Positive reported pain in arthritic joint. Positive difficulty with gait. No skin rashes or issues.      Objective:    /68   Pulse (!) 51   Ht 5' 11\" (1.803 m)   Wt 111 kg (245 lb)   SpO2 96%   BMI 34.17 kg/m²       General Appearance: no distress, conversive  HEENT: PERRLA, conjuctiva normal; oropharynx clear; mucous membranes moist;   Neck:  Supple, no lymphadenopathy or thyromegaly  Lungs: breath sounds normal, normal respiratory effort, no retractions, expiratory effort normal  CV: normal heart sounds S1/S2, PMI normal   ABD: soft non tender, no masses , no hepatic or splenomegaly  EXT: DP pulses intact, no lymphadenopathy, no edema  Skin: normal turgor, normal texture, no rash  Psych: affect normal, mood normal  Neuro: AAOx3        The following portions of the patient's history were " reviewed and updated as appropriate: allergies, current medications, past family history, past medical history, past social history, past surgical history and problem list.     Past History:       Past Medical History:   Diagnosis Date   • Acute venous embolism and thrombosis of deep vessels of proximal lower extremity ([facility])     Last assessed: 5/18/15   • JANEL (acute kidney injury) ([facility])    • Arthritis    • Cellulitis     LE   • Chronic kidney disease 03/2020    Acute Kidney Injury   • CPAP (continuous positive airway pressure) dependence    • Factor V Leiden ([facility])    • Forgetfulness    • Gross hematuria 05/17/2022   • Headache(784.0) 03/2022    Never till cervical  spine surgery   • Heart murmur 03/2020    Told when in hospital   • Native (hard of hearing)    • Hypoalbuminemia 05/11/2022   • Other acute osteomyelitis, other site ([facility]) 01/03/2023   • Paroxysmal atrial fibrillation ([facility])     Last assessed: 11/2/15   • Sleep apnea     Past Surgical History:   Procedure Laterality Date   • BACK SURGERY      Lower   • CARDIAC CATHETERIZATION N/A 04/09/2023    Procedure: Cardiac pci;  Surgeon: Bird Cast MD;  Location: [facility];  Service: Cardiology   • CARDIAC CATHETERIZATION N/A 04/09/2023    Procedure: Cardiac Coronary Angiogram;  Surgeon: Bird Cast MD;  Location: [facility];  Service: Cardiology   • CATARACT EXTRACTION     • COLON SURGERY     • COLONOSCOPY     • INCISION AND DRAINAGE POSTERIOR SPINE N/A 04/01/2022    Procedure: Posterior cervical evacuation of postoperative collection and debridement with placement of drains C3-T1;  Surgeon: Rajeev Garcia MD;  Location: [facility];  Service: Neurosurgery   • IR BIOPSY KIDNEY RANDOM  05/26/2022   • IR TUNNELED DIALYSIS CATHETER PLACEMENT  05/23/2022   • IR TUNNELED DIALYSIS CATHETER REMOVAL  06/02/2022   • MI ARTHRD ANT INTERBODY MIN DSC CRV BELOW C2 N/A 03/11/2022    Procedure: Anterior cervical discectomy and fixation fusion  C5/6 and C6/7; Posterior cervical decompression and instrumented fusion C3-T1;  Surgeon: Rajeev Garcia MD;  Location: [facility];  Service: Neurosurgery   • RENAL BIOPSY  6/2022   • SPINE SURGERY  03/11/2022    Cervical myelopathy/ cervical fusion          Social History     Tobacco Use   • Smoking status: Never   • Smokeless tobacco: Never   Vaping Use   • Vaping status: Never Used   Substance Use Topics   • Alcohol use: Not Currently   • Drug use: No     Family History   Problem Relation Age of Onset   • Lung cancer Mother    • Hearing loss Father    • Emphysema Sister    • Heart attack Brother    • Atrial fibrillation Brother    • Clotting disorder Son           Allergies:     Allergies   Allergen Reactions   • Morphine GI Intolerance   • Vitamin K Other (See Comments)     On coumadin-patient sensitive to vitamin K amounts in meds etc.        Current Medications:     Current Outpatient Medications   Medication Instructions   • acetaminophen (TYLENOL) 500 mg, Oral, As needed   • Aspirin 81 MG CAPS Oral   • atorvastatin (LIPITOR) 80 mg, Oral, Every evening   • Diclofenac Sodium (VOLTAREN) 2 g, Topical, 4 times daily   • DULoxetine (CYMBALTA) 60 mg delayed release capsule TAKE 1 CAPSULE BY MOUTH EVERY DAY   • [START ON 4/5/2024] enoxaparin (LOVENOX) 100 mg, Subcutaneous, Every 24 hours, Start Lovenox injection daily on 4/5/2024 and continue through and including morning 4/7/2024 then stop and then Start Lovenox injection daily on 4/9/2024 and continue through 4/12/2024 or until PT/INR ( coumadin level) is 2.0 or greater as proven by post surgery lab test with monitoring by your PCP or surgical team   • ezetimibe (ZETIA) 10 mg, Oral, Daily   • folic acid (FOLVITE) 1,000 mcg, Oral, Daily   • gabapentin (NEURONTIN) 300 mg capsule TAKE 1 CAPSULE BY MOUTH DAILY AT BEDTIME   • glucose blood (Accu-Chek Guide) test strip Use one new strip daily dx r73.01   • metoprolol succinate (TOPROL-XL) 25 mg 24 hr tablet TAKE  "1 TABLET BY MOUTH TWICE A DAY   • mometasone (ELOCON) 0.1 % cream APPLY TO AFFECTED AREA TOPICALLY EVERY DAY   • nitroglycerin (NITROSTAT) 0.4 mg, Sublingual, Every 5 minutes PRN   • potassium chloride (MICRO-K) 10 MEQ CR capsule 10 mEq, Oral, 2 times daily   • tamsulosin (FLOMAX) 0.4 mg TAKE 1 CAPSULE BY MOUTH DAILY  WITH DINNER   • tirzepatide (MOUNJARO) 2.5 mg, Subcutaneous, Every 7 days   • torsemide (DEMADEX) 20 mg tablet TAKE 30 MG ALTERNATE WITH 20 MG DAILY   • traMADol HCl 100 mg, Oral, Every 12 hours PRN   • warfarin (COUMADIN) 5 mg tablet TAKE ONE-HALF TABLET BY MOUTH  DAILY , EXCEPT TAKE 1 TABLET BY  MOUTH ON WEDNESDAY AND SATURDAY   • zolpidem (AMBIEN) 10 mg, Oral, Daily at bedtime PRN           PRE-OP WORKSHEET DATA    Assessment of Pre-Operative Risks     MLJ Quality Hard Stops:  BMI (<40) : Estimated body mass index is 34.17 kg/m² as calculated from the following:    Height as of this encounter: 5' 11\" (1.803 m).    Weight as of this encounter: 111 kg (245 lb).    Hgb ( >11):   Lab Results   Component Value Date    HGB 12.6 (L) 03/13/2024     HbA1c (<7.5) :   Lab Results   Component Value Date    HGBA1C 6.2 (H) 04/09/2023     GFR (>60) (Less then 45 = Nephrology consult):  Estimated Creatinine Clearance: 41.1 mL/min (A) (by C-G formula based on SCr of 2.03 mg/dL (H)).      Active Decompensated Chronic Conditions which would delay surgery  No acutely decompensated medical issues such as recent CVA, MI, new onset arrhythmia, severe aortic stenosis, CHF, uncontrolled COPD       Exercise Capacity: (if less the 4 mets consider functional assessment via cardiac stress testing or consultation)    Able to walk 2 blocks without symptoms?: Yes  Able to walk 1 flights without symptoms?: Yes    Assessment of intra and post operative respiratory, hemodynamic and thrombotic risks     Prior Anesthesia Reactions: No     Personal history of venous thromboembolic disease? Yes, multi- Leyden Factor def    History of " steroid use > 5 mg for >2 weeks within last year? No      The patient's risk factors for cardiac complications include :  serum Cr > 2.0 mg/dL or GFR < 30    Tian Garcia has an IN HOSPITAL cardiac risk of RCI RISK CLASS II (1 risk factor, risk of major cardiac compl. appr. 1.3%) based on RCRI calculator    Cardiac Risk Estimation: per the Revised Cardiac Risk Index (Circ. 100:1043, 1999),        Pre-Op Data Reviewed:       Laboratory Results: I have personally reviewed the pertinent laboratory results/reports     EKG:I have personally reviewed pertinent reports.  . I personally reviewed and interpreted available tracings in the electronic medical record    sinus bradycardia, 52 bpm, normal axis, RBBB  ms. Abnormal study.        Specimen Collected: 02/28/24 11:02          OLD RECORDS: reviewed old records in the chart review section if EHR on day of visit.    Previous cardiopulmonary studies within the past year:  Echocardiogram: yes, 2023 ef 55-60% moderate AS  Cardiac Catheterization: yes, ROBERT x 2 prox/mid LAD/80% RCA  Stress Test: no      Time of visit including pre-visit chart review, visit and post-visit coordination of plan and care , review of pre-surgical lab work, preparation and time spent documenting note in electronic medical record, time spent face-to-face in physical examination answering patient questions by care team 35 minutes             [facility]

## 2024-03-11 NOTE — ASSESSMENT & PLAN NOTE
Lab Results   Component Value Date    EGFR 31 (L) 02/26/2024    EGFR 33 (L) 01/03/2024    EGFR 32 (L) 01/03/2024    CREATININE 2.17 (H) 02/26/2024    CREATININE 2.10 (H) 01/03/2024    CREATININE 2.16 (H) 01/03/2024   Level 3b  Baseline as above  F/b nephrology  Avoid nephrotoxic medications  Avoid hypotension in the post operative setting  Monitor bmp

## 2024-03-11 NOTE — PATIENT INSTRUCTIONS
Contact surgical nurse  navigator with any questions regarding preoperative plan or schedule.  Stop all over the counter supplements, herbal, naturopathic  medications for 1 week prior to surgery UNLESS prescribed by your surgeon  Hold NSAIDS (i.e. advil, alleve, motrin, ibuprofen, celebrex) minimum 7 days prior to surgery  Hold Asprin minimum 7 days prior to surgery unless instructed otherwise by your physician  Recommend using Tylenol ( acetaminophen ) 500mg every eight hours during the first week post discharge in conjunction with any additional pain medicine prescribed by your surgeon  Use bowel medicines prescribed by your surgeon ( colace) daily post op during the first 1-2 weeks to avoid post operative constipation issues  Practice deep breathing and blood clot prevention exercises starting 2 weeks before surgery and continue for minimum of 2 weeks after surgery. Examples can be found in the following video link: [email or web address].com/[email or web address]  Follow presurgical medication instructions provided by preadmission nursing team reviewed during your presurgery phone call  Presurgery anticougulation plan as follows:  Stop Warfarin on 4/3/2024  Start Lovenox injection daily on 4/5/2024 and continue through and including morning 4/7/2024  Resume Warfarin the evening of surgery  Start Lovenox injection daily on 4/9/2024 and continue through 4/12/2024 or until PT/INR ( coumadin level) is 2.0 or greater as proven by post surgery lab test with monitoring by your PCP or surgical team  Call [phone] with any post discharge concerns or medical issues

## 2024-03-11 NOTE — H&P (VIEW-ONLY)
Internal Medicine Pre-Operative Evaluation:     Reason for Visit: Pre-operative Evaluation for Risk Stratification and Optimization    Patient ID: Tian Garcia is a 74 y.o. male.     Surgery: Arthroplasty of right knee  Referring Provider: Dr Galindo      Recommendations to Proceed withSurgery    Patient is considered to be Medium risk for Medium risk procedure.     After evaluation and discussion with patient with emphasis that all surgery has some degree of inherent risk it is determined this procedure is of acceptable risk  medically.    Patient may proceed with planned procedure      Assessment    Pre-operative Medical Evaluation for planned surgery  Recommendations as listed in PLAN section below  Contact surgical nurse  navigator with any questions regarding preoperative plan or schedule.      Problem List Items Addressed This Visit          Unprioritized    Factor V Leiden mutation ([facility])     Hold warfarin x 5 days prior to surgery  Should be bridged with lovenox  Presurgery anticougulation plan as follows:  Stop Warfarin on 4/3/2024  Start Lovenox injection daily on 4/5/2024 and continue through and including morning 4/7/2024  Resume Warfarin the evening of surgery  Start Lovenox injection daily on 4/9/2024 and continue through 4/12/2024 or until PT/INR ( coumadin level) is 2.0 or greater as proven by post surgery lab test with monitoring by your PCP or surgical team         Primary osteoarthritis of right knee     Failed outpatient conservative measures  Electing to undergo arthroplasty           Paroxysmal A-fib ([facility])     Hold warfarin as instructed  Continue metoprolol as prescribed         History of DVT of lower extremity     Takes warfarin as above         Stage 3b chronic kidney disease (CKD) ([facility])     Lab Results   Component Value Date    EGFR 31 (L) 02/26/2024    EGFR 33 (L) 01/03/2024    EGFR 32 (L) 01/03/2024    CREATININE 2.17 (H) 02/26/2024    CREATININE 2.10 (H) 01/03/2024    CREATININE  2.16 (H) 01/03/2024   Level 3b  Baseline as above  F/b nephrology will see them next week before surgery  Avoid nephrotoxic medications  Avoid hypotension in the post operative setting  Monitor bmp             History of ST elevation myocardial infarction (STEMI)     ROBERT x2 to prox /mid LAD 2023; 80% mid RCA disease  Cont ASA throughout perioperative period per cardiology recommendations  Cleared 2/28/24         Moderate aortic stenosis     Last echo ef 55-60%/moderate AS  Cleared by cards  Monitor volume status carefully          Other Visit Diagnoses       Preoperative clearance    -  Primary                 Plan:     1. Further preoperative workup as follows:   - none no further testing required may proceed with surgery    2. Medication Management/Recommendations:   - Insulin Management: hold mounjaro as directed by PAT  - Warfarin management: Discontinue warfarin 5 days before surgery and resume POD 0 and use lovenox bridge pre and post op  - avoid use of NSAID such as motrin, advil, aleve for 7 days prior to surgery  - hold all OTC herbal or nutritional supplements 7 days before surgery    3. Patient requires further consultation with:   cardiology nephrology    4. Discharge Planning / Barriers to Discharge  none identified - patients has post discharge therapy plan in place, transportation arranged for discharge day, adequate family support at home to assist with discharge to home.        Subjective:           History of Present Illness:     Tian Garcia is a 74 y.o. male who presents to the office today for a preoperative consultation at the request of surgeon. The patient understands this is an elective procedure and not emergent. They are electing to undergo planned procedure with an understanding that all surgery has inherent risk. They have worked with their surgeon and failed conservative treatment measures. Today they present for preoperative risk assessment and recommendations for optimization in  "preparation for surgery.    Pt seen in surgical optimization center for upcoming proposed surgery. They have failed previous conservative measures and have elected surgical intervention.     Pt meets presurgical lab and BMI optimization goals, except Current GFR is <60. We will hold all nephrotoxic medications and monitor bmp closely in the post operative setting avoiding hypotension if at all possible. Pt is followed by nephrology.       Upon interview questioning patient is able to perform greater than 4 METs workload in daily life without any reported cardio-pulmonary symptoms. Pt has a h/o PAF/Aflutter and takes warfarin d/t same as well as Factor V deficiency. See pre-post sx AC plan as above and in AVS. He also has a h/o CAD, s/p catheterization 2023 which included ROBERT x2 to prox/mid LAD, 80% mid RCA, he was cleared by CARDIOLOGY WHO RECOMMENDS CONTINUING ASA THROUGHOUT THE PERIOPERATIVE PERIOD.            ROS: No TIA's or unusual headaches, no dysphagia.  No prolonged cough. No dyspnea or chest pain on exertion.  No abdominal pain, change in bowel habits, black or bloody stools.  No urinary tract or BPH symptoms.  Positive reported pain in arthritic joint. Positive difficulty with gait. No skin rashes or issues.      Objective:    /68   Pulse (!) 51   Ht 5' 11\" (1.803 m)   Wt 111 kg (245 lb)   SpO2 96%   BMI 34.17 kg/m²       General Appearance: no distress, conversive  HEENT: PERRLA, conjuctiva normal; oropharynx clear; mucous membranes moist;   Neck:  Supple, no lymphadenopathy or thyromegaly  Lungs: breath sounds normal, normal respiratory effort, no retractions, expiratory effort normal  CV: normal heart sounds S1/S2, PMI normal   ABD: soft non tender, no masses , no hepatic or splenomegaly  EXT: DP pulses intact, no lymphadenopathy, no edema  Skin: normal turgor, normal texture, no rash  Psych: affect normal, mood normal  Neuro: AAOx3        The following portions of the patient's history were " reviewed and updated as appropriate: allergies, current medications, past family history, past medical history, past social history, past surgical history and problem list.     Past History:       Past Medical History:   Diagnosis Date   • Acute venous embolism and thrombosis of deep vessels of proximal lower extremity ([facility])     Last assessed: 5/18/15   • JANEL (acute kidney injury) ([facility])    • Arthritis    • Cellulitis     LE   • Chronic kidney disease 03/2020    Acute Kidney Injury   • CPAP (continuous positive airway pressure) dependence    • Factor V Leiden ([facility])    • Forgetfulness    • Gross hematuria 05/17/2022   • Headache(784.0) 03/2022    Never till cervical  spine surgery   • Heart murmur 03/2020    Told when in hospital   • [place] (hard of hearing)    • Hypoalbuminemia 05/11/2022   • Other acute osteomyelitis, other site ([facility]) 01/03/2023   • Paroxysmal atrial fibrillation ([facility])     Last assessed: 11/2/15   • Sleep apnea     Past Surgical History:   Procedure Laterality Date   • BACK SURGERY      Lower   • CARDIAC CATHETERIZATION N/A 04/09/2023    Procedure: Cardiac pci;  Surgeon: Bird Cast MD;  Location: [facility];  Service: Cardiology   • CARDIAC CATHETERIZATION N/A 04/09/2023    Procedure: Cardiac Coronary Angiogram;  Surgeon: Bird Cast MD;  Location: [facility]ARD[facility];  Service: Cardiology   • CATARACT EXTRACTION     • COLON SURGERY     • COLONOSCOPY     • INCISION AND DRAINAGE POSTERIOR SPINE N/A 04/01/2022    Procedure: Posterior cervical evacuation of postoperative collection and debridement with placement of drains C3-T1;  Surgeon: Rajeev Garcia MD;  Location: [facility] MAIN [facility];  Service: Neurosurgery   • IR BIOPSY KIDNEY RANDOM  05/26/2022   • IR TUNNELED DIALYSIS CATHETER PLACEMENT  05/23/2022   • IR TUNNELED DIALYSIS CATHETER REMOVAL  06/02/2022   • AL ARTHRD ANT INTERBODY MIN DSC CRV BELOW C2 N/A 03/11/2022    Procedure: Anterior cervical discectomy and fixation fusion  C5/6 and C6/7; Posterior cervical decompression and instrumented fusion C3-T1;  Surgeon: Rajeev Garcia MD;  Location: [facility];  Service: Neurosurgery   • RENAL BIOPSY  6/2022   • SPINE SURGERY  03/11/2022    Cervical myelopathy/ cervical fusion          Social History     Tobacco Use   • Smoking status: Never   • Smokeless tobacco: Never   Vaping Use   • Vaping status: Never Used   Substance Use Topics   • Alcohol use: Not Currently   • Drug use: No     Family History   Problem Relation Age of Onset   • Lung cancer Mother    • Hearing loss Father    • Emphysema Sister    • Heart attack Brother    • Atrial fibrillation Brother    • Clotting disorder Son           Allergies:     Allergies   Allergen Reactions   • Morphine GI Intolerance   • Vitamin K Other (See Comments)     On coumadin-patient sensitive to vitamin K amounts in meds etc.        Current Medications:     Current Outpatient Medications   Medication Instructions   • acetaminophen (TYLENOL) 500 mg, Oral, As needed   • Aspirin 81 MG CAPS Oral   • atorvastatin (LIPITOR) 80 mg, Oral, Every evening   • Diclofenac Sodium (VOLTAREN) 2 g, Topical, 4 times daily   • DULoxetine (CYMBALTA) 60 mg delayed release capsule TAKE 1 CAPSULE BY MOUTH EVERY DAY   • [START ON 4/5/2024] enoxaparin (LOVENOX) 100 mg, Subcutaneous, Every 24 hours, Start Lovenox injection daily on 4/5/2024 and continue through and including morning 4/7/2024 then stop and then Start Lovenox injection daily on 4/9/2024 and continue through 4/12/2024 or until PT/INR ( coumadin level) is 2.0 or greater as proven by post surgery lab test with monitoring by your PCP or surgical team   • ezetimibe (ZETIA) 10 mg, Oral, Daily   • folic acid (FOLVITE) 1,000 mcg, Oral, Daily   • gabapentin (NEURONTIN) 300 mg capsule TAKE 1 CAPSULE BY MOUTH DAILY AT BEDTIME   • glucose blood (Accu-Chek Guide) test strip Use one new strip daily dx r73.01   • metoprolol succinate (TOPROL-XL) 25 mg 24 hr tablet TAKE  "1 TABLET BY MOUTH TWICE A DAY   • mometasone (ELOCON) 0.1 % cream APPLY TO AFFECTED AREA TOPICALLY EVERY DAY   • nitroglycerin (NITROSTAT) 0.4 mg, Sublingual, Every 5 minutes PRN   • potassium chloride (MICRO-K) 10 MEQ CR capsule 10 mEq, Oral, 2 times daily   • tamsulosin (FLOMAX) 0.4 mg TAKE 1 CAPSULE BY MOUTH DAILY  WITH DINNER   • tirzepatide (MOUNJARO) 2.5 mg, Subcutaneous, Every 7 days   • torsemide (DEMADEX) 20 mg tablet TAKE 30 MG ALTERNATE WITH 20 MG DAILY   • traMADol HCl 100 mg, Oral, Every 12 hours PRN   • warfarin (COUMADIN) 5 mg tablet TAKE ONE-HALF TABLET BY MOUTH  DAILY , EXCEPT TAKE 1 TABLET BY  MOUTH ON WEDNESDAY AND SATURDAY   • zolpidem (AMBIEN) 10 mg, Oral, Daily at bedtime PRN           PRE-OP WORKSHEET DATA    Assessment of Pre-Operative Risks     MLJ Quality Hard Stops:  BMI (<40) : Estimated body mass index is 34.17 kg/m² as calculated from the following:    Height as of this encounter: 5' 11\" (1.803 m).    Weight as of this encounter: 111 kg (245 lb).    Hgb ( >11):   Lab Results   Component Value Date    HGB 12.6 (L) 03/13/2024     HbA1c (<7.5) :   Lab Results   Component Value Date    HGBA1C 6.2 (H) 04/09/2023     GFR (>60) (Less then 45 = Nephrology consult):  Estimated Creatinine Clearance: 41.1 mL/min (A) (by C-G formula based on SCr of 2.03 mg/dL (H)).      Active Decompensated Chronic Conditions which would delay surgery  No acutely decompensated medical issues such as recent CVA, MI, new onset arrhythmia, severe aortic stenosis, CHF, uncontrolled COPD       Exercise Capacity: (if less the 4 mets consider functional assessment via cardiac stress testing or consultation)    Able to walk 2 blocks without symptoms?: Yes  Able to walk 1 flights without symptoms?: Yes    Assessment of intra and post operative respiratory, hemodynamic and thrombotic risks     Prior Anesthesia Reactions: No     Personal history of venous thromboembolic disease? Yes, multi- Leyden Factor def    History of " steroid use > 5 mg for >2 weeks within last year? No      The patient's risk factors for cardiac complications include :  serum Cr > 2.0 mg/dL or GFR < 30    Tian Garcia has an IN HOSPITAL cardiac risk of RCI RISK CLASS II (1 risk factor, risk of major cardiac compl. appr. 1.3%) based on RCRI calculator    Cardiac Risk Estimation: per the Revised Cardiac Risk Index (Circ. 100:1043, 1999),        Pre-Op Data Reviewed:       Laboratory Results: I have personally reviewed the pertinent laboratory results/reports     EKG:I have personally reviewed pertinent reports.  . I personally reviewed and interpreted available tracings in the electronic medical record    sinus bradycardia, 52 bpm, normal axis, RBBB  ms. Abnormal study.        Specimen Collected: 02/28/24 11:02          OLD RECORDS: reviewed old records in the chart review section if EHR on day of visit.    Previous cardiopulmonary studies within the past year:  Echocardiogram: yes, 2023 ef 55-60% moderate AS  Cardiac Catheterization: yes, ROBERT x 2 prox/mid LAD/80% RCA  Stress Test: no      Time of visit including pre-visit chart review, visit and post-visit coordination of plan and care , review of pre-surgical lab work, preparation and time spent documenting note in electronic medical record, time spent face-to-face in physical examination answering patient questions by care team 35 minutes             [facility]

## 2024-03-11 NOTE — ASSESSMENT & PLAN NOTE
ROBERT x2 to prox /mid LAD 2023; 80% mid RCA disease  Cont ASA throughout perioperative period per cardiology recommendations  Cleared 2/28/24

## 2024-03-12 ENCOUNTER — TELEPHONE (OUTPATIENT)
Age: 74
End: 2024-03-12

## 2024-03-12 NOTE — TELEPHONE ENCOUNTER
Caller: Patient    Doctor: Alisia    Reason for call: [facility] [place]. Please cb to confirm    Call back#: [phone]

## 2024-03-14 DIAGNOSIS — R73.01 IFG (IMPAIRED FASTING GLUCOSE): ICD-10-CM

## 2024-03-15 NOTE — TELEPHONE ENCOUNTER
Caller: Vega Thomas    Doctor: Mateo    Reason for call: Calling because she received a script for Labs, which he just had all the same labs completed on 03/12/24.   Right now it is not all final yet, which is why you cannot see it.  He did get all his lab done from Dr. Crane. Some is still pending because certain tests take 4-5 day turn around time for results.     You can call for any other results for a partial, they do not fax partials.     Call back#: [phone]

## 2024-03-19 ENCOUNTER — OFFICE VISIT (OUTPATIENT)
Age: 74
End: 2024-03-19
Payer: COMMERCIAL

## 2024-03-19 VITALS
OXYGEN SATURATION: 96 % | DIASTOLIC BLOOD PRESSURE: 68 MMHG | HEART RATE: 51 BPM | HEIGHT: 71 IN | SYSTOLIC BLOOD PRESSURE: 112 MMHG | WEIGHT: 245 LBS | BODY MASS INDEX: 34.3 KG/M2

## 2024-03-19 DIAGNOSIS — M17.11 PRIMARY OSTEOARTHRITIS OF RIGHT KNEE: ICD-10-CM

## 2024-03-19 DIAGNOSIS — E66.01 OBESITY, MORBID (HCC): ICD-10-CM

## 2024-03-19 DIAGNOSIS — Z01.818 PREOPERATIVE CLEARANCE: Primary | ICD-10-CM

## 2024-03-19 DIAGNOSIS — D68.51 FACTOR V LEIDEN MUTATION (HCC): ICD-10-CM

## 2024-03-19 DIAGNOSIS — D68.2 HEREDITARY DEFICIENCY OF OTHER CLOTTING FACTORS (HCC): ICD-10-CM

## 2024-03-19 DIAGNOSIS — I48.0 PAROXYSMAL A-FIB (HCC): ICD-10-CM

## 2024-03-19 DIAGNOSIS — N18.32 STAGE 3B CHRONIC KIDNEY DISEASE (CKD) (HCC): ICD-10-CM

## 2024-03-19 DIAGNOSIS — I25.2 HISTORY OF ST ELEVATION MYOCARDIAL INFARCTION (STEMI): ICD-10-CM

## 2024-03-19 DIAGNOSIS — I35.0 MODERATE AORTIC STENOSIS: ICD-10-CM

## 2024-03-19 DIAGNOSIS — G95.9 CERVICAL MYELOPATHY (HCC): ICD-10-CM

## 2024-03-19 DIAGNOSIS — Z86.718 HISTORY OF DVT OF LOWER EXTREMITY: ICD-10-CM

## 2024-03-19 PROCEDURE — 99215 OFFICE O/P EST HI 40 MIN: CPT | Performed by: INTERNAL MEDICINE

## 2024-03-19 PROCEDURE — G2211 COMPLEX E/M VISIT ADD ON: HCPCS | Performed by: INTERNAL MEDICINE

## 2024-03-19 RX ORDER — ENOXAPARIN SODIUM 300 MG/3ML
100 INJECTION INTRAVENOUS; SUBCUTANEOUS EVERY 24 HOURS
Status: CANCELLED | OUTPATIENT
Start: 2024-04-05 | End: 2024-04-12

## 2024-03-19 RX ORDER — ENOXAPARIN SODIUM 100 MG/ML
100 INJECTION SUBCUTANEOUS EVERY 24 HOURS
Qty: 7 ML | Refills: 0 | Status: SHIPPED | OUTPATIENT
Start: 2024-04-05 | End: 2024-04-12

## 2024-03-21 ENCOUNTER — ANESTHESIA EVENT (OUTPATIENT)
Dept: PERIOP | Facility: HOSPITAL | Age: 74
End: 2024-03-21
Payer: COMMERCIAL

## 2024-03-21 NOTE — PRE-PROCEDURE INSTRUCTIONS
Pre-Surgery Instructions:   Medication Instructions    acetaminophen (TYLENOL) 500 mg tablet Uses PRN- OK to take day of surgery    Aspirin 81 MG CAPS Take day of surgery.    atorvastatin (LIPITOR) 80 mg tablet Take night before surgery    DULoxetine (CYMBALTA) 60 mg delayed release capsule Take day of surgery.    ezetimibe (ZETIA) 10 mg tablet Take day of surgery.    folic acid (FOLVITE) 1 mg tablet Hold day of surgery.    gabapentin (NEURONTIN) 300 mg capsule Take night before surgery    metoprolol succinate (TOPROL-XL) 25 mg 24 hr tablet Take day of surgery.    nitroglycerin (NITROSTAT) 0.4 mg SL tablet Uses PRN- OK to take day of surgery    potassium chloride (MICRO-K) 10 MEQ CR capsule Hold day of surgery.    tamsulosin (FLOMAX) 0.4 mg Take night before surgery    tirzepatide (Mounjaro) 2.5 MG/0.5ML Stop taking 7 days prior to surgery.    torsemide (DEMADEX) 20 mg tablet Hold day of surgery.    traMADol 100 MG TABS Uses PRN- OK to take day of surgery    warfarin (COUMADIN) 5 mg tablet Instructions provided by MD    zolpidem (AMBIEN) 10 mg tablet Take night before surgery      Medication instructions for day surgery reviewed. Please use only a sip of water to take your instructed medications. Avoid all over the counter vitamins, supplements and NSAIDS for one week prior to surgery per anesthesia guidelines. Tylenol is ok to take as needed.     You will receive a call one business day prior to surgery with an arrival time and hospital directions. If your surgery is scheduled on a Monday, the hospital will be calling you on the Friday prior to your surgery. If you have not heard from anyone by 8pm, please call the hospital supervisor through the hospital  at [phone]. ([place] [phone] or [place] [phone]).    Do not eat or drink anything after midnight the night before your surgery, including candy, mints, lifesavers, or chewing gum. Do not drink alcohol 24hrs before your surgery. Try not to  smoke at least 24hrs before your surgery.       Follow the pre surgery showering instructions as listed in the “My Surgical Experience Booklet” or otherwise provided by your surgeon's office. Do not use a blade to shave the surgical area 1 week before surgery. It is okay to use a clean electric clippers up to 24 hours before surgery. Do not apply any lotions, creams, including makeup, cologne, deodorant, or perfumes after showering on the day of your surgery. Do not use dry shampoo, hair spray, hair gel, or any type of hair products.     No contact lenses, eye make-up, or artificial eyelashes. Remove nail polish, including gel polish, and any artificial, gel, or acrylic nails if possible. Remove all jewelry including rings and body piercing jewelry.     Wear causal clothing that is easy to take on and off. Consider your type of surgery.    Keep any valuables, jewelry, piercings at home. Please bring any specially ordered equipment (sling, braces) if indicated.    Arrange for a responsible person to drive you to and from the hospital on the day of your surgery. Please confirm the visitor policy for the day of your procedure when you receive your phone call with an arrival time.     Call the surgeon's office with any new illnesses, exposures, or additional questions prior to surgery.    Please reference your “My Surgical Experience Booklet” for additional information to prepare for your upcoming surgery.    See Geriatric Assessment below...  Matt Total Score: 19  PHQ- 9 Depression Scale:0  Nutrition Assessment Score:0  METS:7  Health goals:  -What are your overall health goals? (quit smoking, wt. loss, rest, decrease stress)  Pt wants to be able to walk without assistance  -What brings you strength? (family, friends, Jainism, health)  family  -What activities are important to you? (exercise, reading, travel, work)   Hunting, fishing, and going to beach

## 2024-03-21 NOTE — ANESTHESIA PREPROCEDURE EVALUATION
Procedure:  ARTHROPLASTY KNEE TOTAL and all associated procedures (Right: Knee)    Relevant Problems   CARDIO   (+) Atherosclerosis of aorta (HCC)   (+) Atherosclerosis of native coronary artery of native heart without angina pectoris   (+) Mixed hyperlipidemia   (+) Moderate aortic stenosis   (+) Paroxysmal A-fib (HCC)   (+) Sinus bradycardia      /RENAL   (+) Glomerulonephritis   (+) 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   (+) Stage 3b chronic kidney disease (CKD) (HCC)      HEMATOLOGY   (+) Anemia   (+) Anemia due to stage 4 chronic kidney disease  (HCC)   (+) 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HCC)      MUSCULOSKELETAL   (+) Other spondylosis with myelopathy, cervical region   (+) Primary osteoarthritis of left knee   (+) Primary osteoarthritis of right knee      NEURO/PSYCH   (+) Head pain cephalgia   (+) Reactive depression      PULMONARY   (+) WILL (obstructive sleep apnea)      Pt with factor 5 leiden, was provided instructions with lovenox bridging.   Pt with mod aortic stenosis    Physical Exam    Airway    Mallampati score: II  TM Distance: >3 FB  Neck ROM: full     Dental       Cardiovascular  Rate: normal    Pulmonary  Pulmonary exam normal     Other Findings  Per pt denies anything remaining that is loose or removeable      Anesthesia Plan  ASA Score- 3     Anesthesia Type- spinal with ASA Monitors.         Additional Monitors:     Airway Plan:            Plan Factors-Exercise tolerance (METS): >4 METS.    Chart reviewed.   Existing labs reviewed. Patient summary reviewed.    Patient is not a current smoker.              Induction- intravenous.    Postoperative Plan- Plan for postoperative opioid use.     Informed Consent- Anesthetic plan and risks discussed with patient.  I personally reviewed this patient with the CRNA. Discussed and agreed on the Anesthesia Plan with the CRNA..

## 2024-03-22 ENCOUNTER — APPOINTMENT (OUTPATIENT)
Dept: RADIOLOGY | Facility: CLINIC | Age: 74
End: 2024-03-22
Payer: COMMERCIAL

## 2024-03-22 DIAGNOSIS — Z98.1 S/P CERVICAL SPINAL FUSION: ICD-10-CM

## 2024-03-22 PROCEDURE — 72052 X-RAY EXAM NECK SPINE 6/>VWS: CPT

## 2024-03-26 ENCOUNTER — ANTICOAG VISIT (OUTPATIENT)
Dept: FAMILY MEDICINE CLINIC | Facility: HOSPITAL | Age: 74
End: 2024-03-26

## 2024-03-26 ENCOUNTER — OFFICE VISIT (OUTPATIENT)
Dept: NEUROSURGERY | Facility: CLINIC | Age: 74
End: 2024-03-26
Payer: COMMERCIAL

## 2024-03-26 VITALS
RESPIRATION RATE: 17 BRPM | SYSTOLIC BLOOD PRESSURE: 118 MMHG | TEMPERATURE: 98 F | OXYGEN SATURATION: 98 % | HEART RATE: 83 BPM | BODY MASS INDEX: 34.3 KG/M2 | WEIGHT: 245 LBS | HEIGHT: 71 IN | DIASTOLIC BLOOD PRESSURE: 78 MMHG

## 2024-03-26 DIAGNOSIS — Z98.1 ARTHRODESIS STATUS: Primary | ICD-10-CM

## 2024-03-26 PROCEDURE — 99212 OFFICE O/P EST SF 10 MIN: CPT | Performed by: NEUROLOGICAL SURGERY

## 2024-03-26 NOTE — PROGRESS NOTES
"DISCUSSION SUMMARY   This is a 74 y.o. male who is status post a 360 fixation fusion.  His instrumentation is intact.  He does continue to have a significant neurologic deficit but is improved with regards to balance in comparison to before the procedure.  Will see him back in the office depending upon any regression in his neurologic status which I do not anticipate.    Return if symptoms worsen or fail to improve.      Diagnosis ICD-10-CM Associated Orders   1. Arthrodesis status  Z98.1            Chief Complaint   Patient presents with    Follow-up        HPIHe continues with upper extremity numbness and tingling however continues to improve.  He has weakness and numbness of his lower extremities.  Reports difficulty with ambulation and uses a walker.  He has had no recent falls.  He does have occasional sleep disturbance because of his pain.  He does need to have a knee replacement performed and has pain radiating from his knee up towards the hips.  He expressed concerns that he cannot do the things that he wants to in life including fishing and hunting.  We discussed alternative ways of doing some of the activities that he would like to do.  He did have a trigger finger which release spontaneously and his hand functionality is much better than it had been previously.    Review of Systems   HENT:  Positive for hearing loss (wears hearing aids).    Eyes:  Negative for visual disturbance.   Musculoskeletal:  Positive for gait problem (ambulates w/ walker, no recent falls), neck pain (occasional) and neck stiffness (w/ certain movements).        Upcoming knee surgery 4/2024 w/ SL, associated knee pain that radiates up towards hips   Neurological:  Positive for weakness (BLE) and numbness (posterior neck numb at surgery site). Negative for light-headedness (occasional) and headaches.        Pt-reports \"trigger finger\" subsided in L/R hands since 2022 surgery   Hematological:  Bruises/bleeds easily (ASA/eliquis). " "  Psychiatric/Behavioral:  Positive for sleep disturbance (due to pain).    All other systems reviewed and are negative.  I reviewed the ROS.    Vitals:    /78 (BP Location: Left arm, Patient Position: Sitting)   Pulse 83   Temp 98 °F (36.7 °C) (Temporal)   Resp 17   Ht 5' 11\" (1.803 m)   Wt 111 kg (245 lb)   SpO2 98%   BMI 34.17 kg/m²     MEDICAL HISTORY  Past Medical History:   Diagnosis Date    Acute venous embolism and thrombosis of deep vessels of proximal lower extremity (HCC)     Last assessed: 5/18/15    JANEL (acute kidney injury) ([facility])     Arthritis     Cellulitis     LE    Chronic kidney disease 03/2020    Acute Kidney Injury    CPAP (continuous positive airway pressure) dependence     Factor V Leiden ([facility])     Forgetfulness     Gross hematuria 05/17/2022    Headache(784.0) 03/2022    Never till cervical  spine surgery    Heart murmur 03/2020    Told when in hospital    History of transfusion     [place] (hard of hearing)     Hypoalbuminemia 05/11/2022    Other acute osteomyelitis, other site ([facility]) 01/03/2023    Paroxysmal atrial fibrillation ([facility])     Last assessed: 11/2/15    Sleep apnea      Past Surgical History:   Procedure Laterality Date    BACK SURGERY      Lower    CARDIAC CATHETERIZATION N/A 04/09/2023    Procedure: Cardiac pci;  Surgeon: Bird Cast MD;  Location: [facility];  Service: Cardiology    CARDIAC CATHETERIZATION N/A 04/09/2023    Procedure: Cardiac Coronary Angiogram;  Surgeon: Bird Cast MD;  Location: [facility];  Service: Cardiology    CATARACT EXTRACTION      COLON SURGERY      COLONOSCOPY      INCISION AND DRAINAGE POSTERIOR SPINE N/A 04/01/2022    Procedure: Posterior cervical evacuation of postoperative collection and debridement with placement of drains C3-T1;  Surgeon: Rajeev Garcia MD;  Location: [facility];  Service: Neurosurgery    IR BIOPSY KIDNEY RANDOM  05/26/2022    IR TUNNELED DIALYSIS CATHETER PLACEMENT  05/23/2022    IR " TUNNELED DIALYSIS CATHETER REMOVAL  06/02/2022    MN ARTHRD ANT INTERBODY MIN DSC CRV BELOW C2 N/A 03/11/2022    Procedure: Anterior cervical discectomy and fixation fusion C5/6 and C6/7; Posterior cervical decompression and instrumented fusion C3-T1;  Surgeon: Rajeev Garcia MD;  Location: [facility];  Service: Neurosurgery    RENAL BIOPSY  6/2022    SPINE SURGERY  03/11/2022    Cervical myelopathy/ cervical fusion     Social History     Tobacco Use    Smoking status: Never    Smokeless tobacco: Never   Vaping Use    Vaping status: Never Used   Substance Use Topics    Alcohol use: Not Currently    Drug use: No      Family History   Problem Relation Age of Onset    Lung cancer Mother     Hearing loss Father     Emphysema Sister     Heart attack Brother     Atrial fibrillation Brother     Clotting disorder Son         Current Outpatient Medications:     acetaminophen (TYLENOL) 500 mg tablet, Take 500 mg by mouth if needed for mild pain, Disp: , Rfl:     Aspirin 81 MG CAPS, Take by mouth, Disp: , Rfl:     atorvastatin (LIPITOR) 80 mg tablet, Take 1 tablet (80 mg total) by mouth every evening, Disp: 90 tablet, Rfl: 0    Diclofenac Sodium (VOLTAREN) 1 %, Apply 2 g topically 4 (four) times a day, Disp: 100 g, Rfl: 3    DULoxetine (CYMBALTA) 60 mg delayed release capsule, TAKE 1 CAPSULE BY MOUTH EVERY DAY, Disp: 90 capsule, Rfl: 1    [START ON 4/5/2024] enoxaparin (LOVENOX) 100 mg/mL, Inject 1 mL (100 mg total) under the skin every 24 hours for 7 days Start Lovenox injection daily on 4/5/2024 and continue through and including morning 4/7/2024 then stop and then Start Lovenox injection daily on 4/9/2024 and continue through 4/12/2024 or until PT/INR ( coumadin level) is 2.0 or greater as proven by post surgery lab test with monitoring by your PCP or surgical team Do not start before April 5, 2024., Disp: 7 mL, Rfl: 0    ezetimibe (ZETIA) 10 mg tablet, TAKE 1 TABLET BY MOUTH EVERY DAY, Disp: 90 tablet, Rfl: 3     folic acid (FOLVITE) 1 mg tablet, TAKE 1 TABLET BY MOUTH DAILY, Disp: 30 tablet, Rfl: 0    gabapentin (NEURONTIN) 300 mg capsule, TAKE 1 CAPSULE BY MOUTH DAILY AT BEDTIME, Disp: 90 capsule, Rfl: 1    glucose blood (Accu-Chek Guide) test strip, Use one new strip daily dx r73.01, Disp: 100 strip, Rfl: 1    metoprolol succinate (TOPROL-XL) 25 mg 24 hr tablet, TAKE 1 TABLET BY MOUTH TWICE A DAY, Disp: 180 tablet, Rfl: 1    mometasone (ELOCON) 0.1 % cream, APPLY TO AFFECTED AREA TOPICALLY EVERY DAY, Disp: 45 g, Rfl: 1    nitroglycerin (NITROSTAT) 0.4 mg SL tablet, Place 1 tablet (0.4 mg total) under the tongue every 5 (five) minutes as needed for chest pain, Disp: 30 tablet, Rfl: 0    potassium chloride (MICRO-K) 10 MEQ CR capsule, TAKE 1 CAPSULE BY MOUTH TWICE A DAY, Disp: 180 capsule, Rfl: 1    tamsulosin (FLOMAX) 0.4 mg, TAKE 1 CAPSULE BY MOUTH DAILY  WITH DINNER, Disp: 90 capsule, Rfl: 1    tirzepatide (Mounjaro) 2.5 MG/0.5ML, Inject 0.5 mL (2.5 mg total) under the skin every 7 days, Disp: 2 mL, Rfl: 1    torsemide (DEMADEX) 20 mg tablet, TAKE 30 MG ALTERNATE WITH 20 MG DAILY, Disp: 135 tablet, Rfl: 0    traMADol HCl 100 MG TABS, Take 100 mg by mouth 2 (two) times a day, Disp: 60 tablet, Rfl: 0    warfarin (COUMADIN) 5 mg tablet, TAKE ONE-HALF TABLET BY MOUTH  DAILY , EXCEPT TAKE 1 TABLET BY  MOUTH ON WEDNESDAY AND SATURDAY, Disp: 58 tablet, Rfl: 3    zolpidem (AMBIEN) 10 mg tablet, TAKE 1 TABLET BY MOUTH ONCE  DAILY AT BEDTIME AS NEEDED FOR  SLEEP, Disp: 90 tablet, Rfl: 0     Allergies   Allergen Reactions    Morphine GI Intolerance    Vitamin K Other (See Comments)     On coumadin-patient sensitive to vitamin K amounts in meds etc.        The following portions of the patient's history were updated by MA and reviewed by MD: allergies, current medications, past family history, past medical history, past social history, past surgical history, and problem list.    Physical Exam  Vitals and nursing note reviewed.    Constitutional:       General: He is not in acute distress.     Appearance: Normal appearance. He is normal weight. He is not ill-appearing, toxic-appearing or diaphoretic.   HENT:      Head: Normocephalic and atraumatic.      Nose: Nose normal.   Eyes:      Extraocular Movements: Extraocular movements intact.      Pupils: Pupils are equal, round, and reactive to light.   Musculoskeletal:         General: No swelling, tenderness, deformity or signs of injury. Normal range of motion.      Cervical back: Normal range of motion and neck supple.      Right lower leg: No edema.      Left lower leg: No edema.   Skin:     General: Skin is warm and dry.   Neurological:      General: No focal deficit present.      Mental Status: He is alert and oriented to person, place, and time. Mental status is at baseline.      Cranial Nerves: No cranial nerve deficit.      Sensory: No sensory deficit.      Motor: Weakness (Weakness bilateral lower extremities however he can ambulate.) present.      Coordination: Coordination normal.      Gait: Gait abnormal (Ambulates with a wide-based gait and cannot perform tandem gait.  This is however improved in comparison to his preoperative exam.).      Deep Tendon Reflexes: Reflexes normal.   Psychiatric:         Mood and Affect: Mood normal.         Behavior: Behavior normal.         Thought Content: Thought content normal.         Judgment: Judgment normal.         RESULTS/DATA  I have personally reviewed pertinent films in PACS  X-rays of the cervical spine are carefully reviewed.  Screws and rods are intact without signs of loosening or breakage.

## 2024-03-27 ENCOUNTER — EVALUATION (OUTPATIENT)
Dept: PHYSICAL THERAPY | Facility: CLINIC | Age: 74
End: 2024-03-27
Payer: COMMERCIAL

## 2024-03-27 DIAGNOSIS — M17.11 PRIMARY OSTEOARTHRITIS OF RIGHT KNEE: ICD-10-CM

## 2024-03-27 DIAGNOSIS — Z01.818 PREOPERATIVE TESTING: ICD-10-CM

## 2024-03-27 DIAGNOSIS — M17.11 PRIMARY OSTEOARTHRITIS OF RIGHT KNEE: Primary | ICD-10-CM

## 2024-03-27 PROCEDURE — 97110 THERAPEUTIC EXERCISES: CPT | Performed by: PHYSICAL THERAPIST

## 2024-03-27 PROCEDURE — 97161 PT EVAL LOW COMPLEX 20 MIN: CPT | Performed by: PHYSICAL THERAPIST

## 2024-03-27 NOTE — PROGRESS NOTES
PT Evaluation     Today's date: 3/27/2024  Patient name: Tian Garcia  : 1950  MRN: [id number]  Referring provider: Dot Galindo MD  Dx:   Encounter Diagnosis     ICD-10-CM    1. Primary osteoarthritis of right knee  M17.11 Ambulatory referral to Physical Therapy      2. Preoperative testing  Z01.818 Ambulatory referral to Physical Therapy                     Assessment  Assessment details: Pt completed pre operative assessment this session with physical therapy prior to right total knee replacement on 24. At this time patient is signifiantly limited in right hip strength with minor impairment in range of motion, decreased strength of quadricep muscle  but full range of motion of knee joint. Pt has overall impaired strength grossly of R LE.       Reviewed Home Preparation Checklist was reviewed with patient including identification of care partner and encouragement of single level set-up. Post-operative pain management expectations discussed to the patient to satisfaction. Post-operative gait training for level ground, stairs, and car transfers was performed. Patient demonstrated competence with immediate post-operative home exercise program.       Upon return from surgery pt will benefit from skilled therapy intervention 3x/week for 2 weeks then 2x/week for 6 weeks after in order to improve neuromuscular control, balance, strength, range of motion, improve independence and overall work on pain control and retur npatient to greatest level of independence possible.     Goals  STG  Pt will have 90 degrees knee flexion ROM in 4 weeks  Pt will have 0 degrees knee extension ROM in 4 weeks  Pt will have 3/5 MMT of right knee extension in 4 weeks  Pt will be independent with HEP in 4 weeks  Pt will be able to ascend/descend stairs independently in 4 weeks    LTG  Pt will have full knee ROM of R knee in 8 weeks  Pt will have at least 4/5 MMT strength of right knee extensors in 8 weeks  Pt will be  independent with mobility using LRAD in 8 weeks  Pt will complete TUG below 13 seconds in 8 weeks    Plan  Planned therapy interventions: neuromuscular re-education, patient education, self care, strengthening, stretching, therapeutic activities, therapeutic exercise, gait training and home exercise program  Frequency: 2x week  Duration in weeks: 9  Plan of Care beginning date: 3/27/2024  Plan of Care expiration date: 2024  Treatment plan discussed with: patient        Subjective Evaluation    History of Present Illness  Mechanism of injury: Pt was in physical therapy for several weeks about one year ago. At the end of his POC he had a HA and surgery then began cardiac rehab. He has been waiting on a knee replacement surgery on 24 on his R knee. Currently walks with rollator walker. Has a h/o cervical spine fusion with complications of infection which have caused him to have remaining deficits of strength in lower extremity.   Patient Goals  Patient goal: improve balance and walking with reduced knee pain  Pain  Current pain ratin  At worst pain ratin  Location: right knee    Social Support  Steps to enter house: yes  Stairs in house: yes     Employment status: not working  Treatments  Previous treatment: physical therapy        Objective    STrength  R Hip flex: 3+/5  R Hip ext: 2+/5  R hip abd: 2+/5  R knee flex: 4/5  R knee ext: 3/5  R ankle PF: 2/5  R ankl DF: 4/5    ROM:  R hip ext: 0  R hip IR: 15  R hip ext: 30  Knee ext: 0  Knee flex: 110    TUG: - 17.13 sec with rollator        Short Term Goal Expiration Date:(24)  Long Term Goal Expiration Date: (24)  POC Expiration Date: (24)      POC expires Unit limit Auth Expiration date PT/OT/ST + Visit Limit?   24 bomn bomn bomn                           Visit/Unit Tracking  AUTH Status:  Date 3/27             pending Used 1              Remaining                     Precautions fall risk       Manuals 3/27                                        Neuro Re-Ed                                                                 Ther Ex        Ankle pumps        Glut sets        Supine Heel slide        Supine Quad set        LAQ        Supine hip abd                        Ther Activity                        Gait Training                        Modalities

## 2024-03-28 DIAGNOSIS — M19.012 PRIMARY OSTEOARTHRITIS OF LEFT SHOULDER: ICD-10-CM

## 2024-03-28 DIAGNOSIS — G47.00 INSOMNIA, UNSPECIFIED TYPE: ICD-10-CM

## 2024-03-28 RX ORDER — ZOLPIDEM TARTRATE 10 MG/1
10 TABLET ORAL
Qty: 90 TABLET | Refills: 0 | OUTPATIENT
Start: 2024-03-28

## 2024-03-28 RX ORDER — TRAMADOL HYDROCHLORIDE 100 MG/1
100 TABLET, COATED ORAL 2 TIMES DAILY
Qty: 60 TABLET | Refills: 0 | Status: SHIPPED | OUTPATIENT
Start: 2024-03-28

## 2024-03-28 RX ORDER — ZOLPIDEM TARTRATE 10 MG/1
10 TABLET ORAL
Qty: 90 TABLET | Refills: 0 | Status: SHIPPED | OUTPATIENT
Start: 2024-03-28

## 2024-03-29 ENCOUNTER — TELEPHONE (OUTPATIENT)
Age: 74
End: 2024-03-29

## 2024-03-29 NOTE — TELEPHONE ENCOUNTER
Left a voicemail to patient asking to please call the office and let us know What surgery are you having? .

## 2024-03-29 NOTE — TELEPHONE ENCOUNTER
Patient called and said he is having surgery on April 8th. He says he needs a surgical clearance by Neph in order to get surgery. He wasn't sure if he needed an office visit or if we could just do some labs and be cleared. If he needs an appt I don't see any in [facility] or [facility] before the. Could someone please advise the patient? Thanks!

## 2024-03-29 NOTE — TELEPHONE ENCOUNTER
Pt called back to respond to surgery clearance question. He states that he is getting total knee replacement surgery of the right knee. Please give pt a call with any other information in regards to this matter [phone]

## 2024-04-01 ENCOUNTER — TELEPHONE (OUTPATIENT)
Age: 74
End: 2024-04-01

## 2024-04-01 ENCOUNTER — TELEPHONE (OUTPATIENT)
Dept: OBGYN CLINIC | Facility: CLINIC | Age: 74
End: 2024-04-01

## 2024-04-01 ENCOUNTER — APPOINTMENT (OUTPATIENT)
Dept: PHYSICAL THERAPY | Facility: CLINIC | Age: 74
End: 2024-04-01
Payer: COMMERCIAL

## 2024-04-01 NOTE — TELEPHONE ENCOUNTER
Phone call from Ortho - Clearance form sent has not been received. Patient scheduled for surgery on 4/8/24. Spoke to Julissa this morning did not have a cleared answered, since clinical staff was not avail, advised to take number and office to call Ortho. Please contact Lillian (Ortho) at [phone].

## 2024-04-01 NOTE — TELEPHONE ENCOUNTER
"Per Dr Covarrubias:    Pt does not need in office appt for clearance.     \"I sent a message back stating diuretics should be held the day of surgery, should avoid NSAIDs, and renal should be consulted inpatient at the time of surgery pre op.  If there is a clearance form I need to fill out please email it to me.\"   "

## 2024-04-01 NOTE — TELEPHONE ENCOUNTER
Does patient need an appointment in office to be cleared prior to surgery on 4/8 or can patient be cleared with clearance form, there are no available appointments prior to surgery and patient is scheduled for a follow up end of May in the QO.     Please advise

## 2024-04-01 NOTE — TELEPHONE ENCOUNTER
Lillian (Ortho) at [phone]. Spoke with Lillian went over the above message and she said can Dr. Covarrubias write her recommendation and sing the Form. Asked for the Form because our office no received. Lillian said she sent it to media. Sent to Dr. Covarrubias the form. We need to fax it to Orthopedic.

## 2024-04-01 NOTE — TELEPHONE ENCOUNTER
Pt called asking if we have the results of his latest blood test-yes everything is resulted.  He also states that he has a stye in his eye.  He's doing hot compresses and wants to know if this will interfere with surgery or if he should be doing something other than compresses.  Message sent to provider for advice.  Will call patient with answer once received.

## 2024-04-02 NOTE — TELEPHONE ENCOUNTER
Called Lillian to let her know that faxed the clearance form at [phone]. No further questions at this time.

## 2024-04-03 ENCOUNTER — TELEPHONE (OUTPATIENT)
Dept: OBGYN CLINIC | Facility: CLINIC | Age: 74
End: 2024-04-03

## 2024-04-04 ENCOUNTER — TELEPHONE (OUTPATIENT)
Age: 74
End: 2024-04-04

## 2024-04-04 NOTE — TELEPHONE ENCOUNTER
The clearance letter for ortho is very dark and they are unable to read it. Can you scan a clearer copy of it under media? Thank you

## 2024-04-04 NOTE — TELEPHONE ENCOUNTER
I spoke with Lillian and sent to Lillian patient clearance Form. No further questions at this time.

## 2024-04-08 ENCOUNTER — ANESTHESIA (OUTPATIENT)
Dept: PERIOP | Facility: HOSPITAL | Age: 74
End: 2024-04-08
Payer: COMMERCIAL

## 2024-04-08 ENCOUNTER — HOSPITAL ENCOUNTER (OUTPATIENT)
Facility: HOSPITAL | Age: 74
Setting detail: OUTPATIENT SURGERY
Discharge: HOME | End: 2024-04-09
Attending: ORTHOPAEDIC SURGERY | Admitting: ORTHOPAEDIC SURGERY
Payer: COMMERCIAL

## 2024-04-08 DIAGNOSIS — Z96.651 STATUS POST TOTAL KNEE REPLACEMENT USING CEMENT, RIGHT: Primary | ICD-10-CM

## 2024-04-08 DIAGNOSIS — D68.51 FACTOR V LEIDEN MUTATION (HCC): ICD-10-CM

## 2024-04-08 DIAGNOSIS — M17.11 PRIMARY OSTEOARTHRITIS OF RIGHT KNEE: ICD-10-CM

## 2024-04-08 DIAGNOSIS — N18.4 ANEMIA DUE TO STAGE 4 CHRONIC KIDNEY DISEASE  (HCC): ICD-10-CM

## 2024-04-08 DIAGNOSIS — N18.32 STAGE 3B CHRONIC KIDNEY DISEASE (CKD) (HCC): ICD-10-CM

## 2024-04-08 DIAGNOSIS — M17.12 PRIMARY OSTEOARTHRITIS OF LEFT KNEE: ICD-10-CM

## 2024-04-08 DIAGNOSIS — I25.10 ATHEROSCLEROSIS OF NATIVE CORONARY ARTERY OF NATIVE HEART WITHOUT ANGINA PECTORIS: ICD-10-CM

## 2024-04-08 DIAGNOSIS — D63.1 ANEMIA DUE TO STAGE 4 CHRONIC KIDNEY DISEASE  (HCC): ICD-10-CM

## 2024-04-08 DIAGNOSIS — Z96.651 STATUS POST TOTAL RIGHT KNEE REPLACEMENT: ICD-10-CM

## 2024-04-08 PROBLEM — R73.02 IGT (IMPAIRED GLUCOSE TOLERANCE): Status: ACTIVE | Noted: 2024-04-08

## 2024-04-08 LAB
ABO GROUP BLD: NORMAL
APTT PPP: 29 SECONDS (ref 23–37)
BLD GP AB SCN SERPL QL: NEGATIVE
GLUCOSE SERPL-MCNC: 112 MG/DL (ref 65–140)
INR PPP: 1.02 (ref 0.84–1.19)
PROTHROMBIN TIME: 14 SECONDS (ref 11.6–14.5)
RH BLD: POSITIVE
SPECIMEN EXPIRATION DATE: NORMAL

## 2024-04-08 PROCEDURE — 99221 1ST HOSP IP/OBS SF/LOW 40: CPT | Performed by: INTERNAL MEDICINE

## 2024-04-08 PROCEDURE — C1776 JOINT DEVICE (IMPLANTABLE): HCPCS | Performed by: ORTHOPAEDIC SURGERY

## 2024-04-08 PROCEDURE — 27447 TOTAL KNEE ARTHROPLASTY: CPT | Performed by: PHYSICIAN ASSISTANT

## 2024-04-08 PROCEDURE — 86850 RBC ANTIBODY SCREEN: CPT | Performed by: ORTHOPAEDIC SURGERY

## 2024-04-08 PROCEDURE — 27447 TOTAL KNEE ARTHROPLASTY: CPT | Performed by: ORTHOPAEDIC SURGERY

## 2024-04-08 PROCEDURE — C1713 ANCHOR/SCREW BN/BN,TIS/BN: HCPCS | Performed by: ORTHOPAEDIC SURGERY

## 2024-04-08 PROCEDURE — 82948 REAGENT STRIP/BLOOD GLUCOSE: CPT

## 2024-04-08 PROCEDURE — 97163 PT EVAL HIGH COMPLEX 45 MIN: CPT

## 2024-04-08 PROCEDURE — 86901 BLOOD TYPING SEROLOGIC RH(D): CPT | Performed by: ORTHOPAEDIC SURGERY

## 2024-04-08 PROCEDURE — 85730 THROMBOPLASTIN TIME PARTIAL: CPT | Performed by: ANESTHESIOLOGY

## 2024-04-08 PROCEDURE — 97167 OT EVAL HIGH COMPLEX 60 MIN: CPT

## 2024-04-08 PROCEDURE — 85610 PROTHROMBIN TIME: CPT | Performed by: ANESTHESIOLOGY

## 2024-04-08 PROCEDURE — 86900 BLOOD TYPING SEROLOGIC ABO: CPT | Performed by: ORTHOPAEDIC SURGERY

## 2024-04-08 DEVICE — ATTUNE KNEE SYSTEM FEMORAL POSTERIOR STABILIZED SIZE 9 RIGHT CEMENTED
Type: IMPLANTABLE DEVICE | Site: KNEE | Status: FUNCTIONAL
Brand: ATTUNE

## 2024-04-08 DEVICE — ATTUNE KNEE SYSTEM TIBIAL BASE ROTATING PLATFORM SIZE 8 CEMENTED
Type: IMPLANTABLE DEVICE | Site: KNEE | Status: FUNCTIONAL
Brand: ATTUNE

## 2024-04-08 DEVICE — SMARTSET HV HIGH VISCOSITY BONE CEMENT 40G
Type: IMPLANTABLE DEVICE | Site: KNEE | Status: FUNCTIONAL
Brand: SMARTSET

## 2024-04-08 DEVICE — ATTUNE PATELLA MEDIALIZED DOME 41MM CEMENTED AOX
Type: IMPLANTABLE DEVICE | Site: KNEE | Status: FUNCTIONAL
Brand: ATTUNE

## 2024-04-08 DEVICE — ATTUNE KNEE SYSTEM TIBIAL INSERT ROTATING PLATFORM POSTERIOR STABILIZED SIZE 9 5MM AOX
Type: IMPLANTABLE DEVICE | Site: KNEE | Status: FUNCTIONAL
Brand: ATTUNE

## 2024-04-08 RX ORDER — OXYCODONE HYDROCHLORIDE 10 MG/1
10 TABLET ORAL EVERY 4 HOURS PRN
Status: DISCONTINUED | OUTPATIENT
Start: 2024-04-08 | End: 2024-04-09 | Stop reason: HOSPADM

## 2024-04-08 RX ORDER — METOPROLOL SUCCINATE 25 MG/1
25 TABLET, EXTENDED RELEASE ORAL 2 TIMES DAILY
Status: DISCONTINUED | OUTPATIENT
Start: 2024-04-08 | End: 2024-04-09 | Stop reason: HOSPADM

## 2024-04-08 RX ORDER — WARFARIN SODIUM 5 MG/1
5 TABLET ORAL
Status: DISCONTINUED | OUTPATIENT
Start: 2024-04-08 | End: 2024-04-09 | Stop reason: HOSPADM

## 2024-04-08 RX ORDER — CEFAZOLIN SODIUM 2 G/50ML
2000 SOLUTION INTRAVENOUS ONCE
Status: COMPLETED | OUTPATIENT
Start: 2024-04-08 | End: 2024-04-08

## 2024-04-08 RX ORDER — METOPROLOL TARTRATE 1 MG/ML
INJECTION, SOLUTION INTRAVENOUS AS NEEDED
Status: DISCONTINUED | OUTPATIENT
Start: 2024-04-08 | End: 2024-04-08

## 2024-04-08 RX ORDER — ONDANSETRON 2 MG/ML
4 INJECTION INTRAMUSCULAR; INTRAVENOUS ONCE AS NEEDED
Status: DISCONTINUED | OUTPATIENT
Start: 2024-04-08 | End: 2024-04-08

## 2024-04-08 RX ORDER — SODIUM CHLORIDE, SODIUM LACTATE, POTASSIUM CHLORIDE, CALCIUM CHLORIDE 600; 310; 30; 20 MG/100ML; MG/100ML; MG/100ML; MG/100ML
INJECTION, SOLUTION INTRAVENOUS CONTINUOUS PRN
Status: DISCONTINUED | OUTPATIENT
Start: 2024-04-08 | End: 2024-04-08

## 2024-04-08 RX ORDER — PROPOFOL 10 MG/ML
INJECTION, EMULSION INTRAVENOUS CONTINUOUS PRN
Status: DISCONTINUED | OUTPATIENT
Start: 2024-04-08 | End: 2024-04-08

## 2024-04-08 RX ORDER — LIDOCAINE HYDROCHLORIDE AND EPINEPHRINE 10; 10 MG/ML; UG/ML
INJECTION, SOLUTION INFILTRATION; PERINEURAL AS NEEDED
Status: DISCONTINUED | OUTPATIENT
Start: 2024-04-08 | End: 2024-04-08 | Stop reason: HOSPADM

## 2024-04-08 RX ORDER — WARFARIN SODIUM 2.5 MG/1
2.5 TABLET ORAL
Status: DISCONTINUED | OUTPATIENT
Start: 2024-04-08 | End: 2024-04-08

## 2024-04-08 RX ORDER — ACETAMINOPHEN 325 MG/1
650 TABLET ORAL EVERY 6 HOURS PRN
Status: DISCONTINUED | OUTPATIENT
Start: 2024-04-08 | End: 2024-04-09 | Stop reason: HOSPADM

## 2024-04-08 RX ORDER — TRANEXAMIC ACID 100 MG/ML
INJECTION, SOLUTION INTRAVENOUS AS NEEDED
Status: DISCONTINUED | OUTPATIENT
Start: 2024-04-08 | End: 2024-04-08 | Stop reason: HOSPADM

## 2024-04-08 RX ORDER — ONDANSETRON 2 MG/ML
INJECTION INTRAMUSCULAR; INTRAVENOUS AS NEEDED
Status: DISCONTINUED | OUTPATIENT
Start: 2024-04-08 | End: 2024-04-08

## 2024-04-08 RX ORDER — FOLIC ACID 1 MG/1
1000 TABLET ORAL DAILY
Status: DISCONTINUED | OUTPATIENT
Start: 2024-04-08 | End: 2024-04-09 | Stop reason: HOSPADM

## 2024-04-08 RX ORDER — CEFAZOLIN SODIUM 1 G/50ML
1000 SOLUTION INTRAVENOUS EVERY 8 HOURS
Qty: 100 ML | Refills: 0 | Status: COMPLETED | OUTPATIENT
Start: 2024-04-08 | End: 2024-04-09

## 2024-04-08 RX ORDER — SODIUM CHLORIDE, SODIUM LACTATE, POTASSIUM CHLORIDE, CALCIUM CHLORIDE 600; 310; 30; 20 MG/100ML; MG/100ML; MG/100ML; MG/100ML
75 INJECTION, SOLUTION INTRAVENOUS CONTINUOUS
Status: DISCONTINUED | OUTPATIENT
Start: 2024-04-08 | End: 2024-04-09 | Stop reason: HOSPADM

## 2024-04-08 RX ORDER — KETOROLAC TROMETHAMINE 30 MG/ML
INJECTION, SOLUTION INTRAMUSCULAR; INTRAVENOUS AS NEEDED
Status: DISCONTINUED | OUTPATIENT
Start: 2024-04-08 | End: 2024-04-08 | Stop reason: HOSPADM

## 2024-04-08 RX ORDER — ATORVASTATIN CALCIUM 40 MG/1
80 TABLET, FILM COATED ORAL EVERY EVENING
Status: DISCONTINUED | OUTPATIENT
Start: 2024-04-08 | End: 2024-04-09 | Stop reason: HOSPADM

## 2024-04-08 RX ORDER — BUPIVACAINE HYDROCHLORIDE 7.5 MG/ML
INJECTION, SOLUTION INTRASPINAL AS NEEDED
Status: DISCONTINUED | OUTPATIENT
Start: 2024-04-08 | End: 2024-04-08

## 2024-04-08 RX ORDER — MAGNESIUM HYDROXIDE 1200 MG/15ML
LIQUID ORAL AS NEEDED
Status: DISCONTINUED | OUTPATIENT
Start: 2024-04-08 | End: 2024-04-08 | Stop reason: HOSPADM

## 2024-04-08 RX ORDER — TRANEXAMIC ACID 10 MG/ML
1000 INJECTION, SOLUTION INTRAVENOUS ONCE
Status: COMPLETED | OUTPATIENT
Start: 2024-04-08 | End: 2024-04-08

## 2024-04-08 RX ORDER — DEXAMETHASONE SODIUM PHOSPHATE 10 MG/ML
INJECTION, SOLUTION INTRAMUSCULAR; INTRAVENOUS AS NEEDED
Status: DISCONTINUED | OUTPATIENT
Start: 2024-04-08 | End: 2024-04-08

## 2024-04-08 RX ORDER — DOCUSATE SODIUM 100 MG/1
100 CAPSULE, LIQUID FILLED ORAL 2 TIMES DAILY
Status: DISCONTINUED | OUTPATIENT
Start: 2024-04-08 | End: 2024-04-09 | Stop reason: HOSPADM

## 2024-04-08 RX ORDER — ONDANSETRON 2 MG/ML
4 INJECTION INTRAMUSCULAR; INTRAVENOUS EVERY 6 HOURS PRN
Status: DISCONTINUED | OUTPATIENT
Start: 2024-04-08 | End: 2024-04-08 | Stop reason: SINTOL

## 2024-04-08 RX ORDER — MIDAZOLAM HYDROCHLORIDE 2 MG/2ML
INJECTION, SOLUTION INTRAMUSCULAR; INTRAVENOUS AS NEEDED
Status: DISCONTINUED | OUTPATIENT
Start: 2024-04-08 | End: 2024-04-08

## 2024-04-08 RX ORDER — TAMSULOSIN HYDROCHLORIDE 0.4 MG/1
0.4 CAPSULE ORAL
Status: DISCONTINUED | OUTPATIENT
Start: 2024-04-08 | End: 2024-04-09 | Stop reason: HOSPADM

## 2024-04-08 RX ORDER — ENOXAPARIN SODIUM 100 MG/ML
100 INJECTION SUBCUTANEOUS DAILY
Status: DISCONTINUED | OUTPATIENT
Start: 2024-04-09 | End: 2024-04-09 | Stop reason: HOSPADM

## 2024-04-08 RX ORDER — SODIUM CHLORIDE, SODIUM LACTATE, POTASSIUM CHLORIDE, CALCIUM CHLORIDE 600; 310; 30; 20 MG/100ML; MG/100ML; MG/100ML; MG/100ML
75 INJECTION, SOLUTION INTRAVENOUS CONTINUOUS
Status: DISCONTINUED | OUTPATIENT
Start: 2024-04-08 | End: 2024-04-09

## 2024-04-08 RX ORDER — EZETIMIBE 10 MG/1
10 TABLET ORAL DAILY
Status: DISCONTINUED | OUTPATIENT
Start: 2024-04-08 | End: 2024-04-09 | Stop reason: HOSPADM

## 2024-04-08 RX ORDER — METOCLOPRAMIDE HYDROCHLORIDE 5 MG/ML
10 INJECTION INTRAMUSCULAR; INTRAVENOUS EVERY 6 HOURS PRN
Status: DISCONTINUED | OUTPATIENT
Start: 2024-04-08 | End: 2024-04-09

## 2024-04-08 RX ORDER — OXYCODONE HYDROCHLORIDE 5 MG/1
5 TABLET ORAL EVERY 4 HOURS PRN
Status: DISCONTINUED | OUTPATIENT
Start: 2024-04-08 | End: 2024-04-09 | Stop reason: HOSPADM

## 2024-04-08 RX ORDER — DULOXETIN HYDROCHLORIDE 60 MG/1
60 CAPSULE, DELAYED RELEASE ORAL DAILY
Status: DISCONTINUED | OUTPATIENT
Start: 2024-04-08 | End: 2024-04-09 | Stop reason: HOSPADM

## 2024-04-08 RX ORDER — HYDROMORPHONE HCL/PF 1 MG/ML
0.5 SYRINGE (ML) INJECTION EVERY 2 HOUR PRN
Status: DISCONTINUED | OUTPATIENT
Start: 2024-04-08 | End: 2024-04-09 | Stop reason: HOSPADM

## 2024-04-08 RX ORDER — FENTANYL CITRATE 50 UG/ML
INJECTION, SOLUTION INTRAMUSCULAR; INTRAVENOUS AS NEEDED
Status: DISCONTINUED | OUTPATIENT
Start: 2024-04-08 | End: 2024-04-08

## 2024-04-08 RX ORDER — FENTANYL CITRATE/PF 50 MCG/ML
25 SYRINGE (ML) INJECTION
Status: DISCONTINUED | OUTPATIENT
Start: 2024-04-08 | End: 2024-04-08 | Stop reason: HOSPADM

## 2024-04-08 RX ORDER — SENNOSIDES 8.6 MG
1 TABLET ORAL DAILY
Status: DISCONTINUED | OUTPATIENT
Start: 2024-04-08 | End: 2024-04-09 | Stop reason: HOSPADM

## 2024-04-08 RX ORDER — METHOCARBAMOL 750 MG/1
750 TABLET, FILM COATED ORAL EVERY 6 HOURS SCHEDULED
Status: DISCONTINUED | OUTPATIENT
Start: 2024-04-08 | End: 2024-04-09 | Stop reason: HOSPADM

## 2024-04-08 RX ORDER — DIPHENHYDRAMINE HYDROCHLORIDE 50 MG/ML
INJECTION INTRAMUSCULAR; INTRAVENOUS AS NEEDED
Status: DISCONTINUED | OUTPATIENT
Start: 2024-04-08 | End: 2024-04-08

## 2024-04-08 RX ORDER — CHLORHEXIDINE GLUCONATE ORAL RINSE 1.2 MG/ML
15 SOLUTION DENTAL ONCE
Status: COMPLETED | OUTPATIENT
Start: 2024-04-08 | End: 2024-04-08

## 2024-04-08 RX ORDER — METOPROLOL SUCCINATE 25 MG/1
25 TABLET, EXTENDED RELEASE ORAL ONCE
Status: COMPLETED | OUTPATIENT
Start: 2024-04-08 | End: 2024-04-08

## 2024-04-08 RX ORDER — GABAPENTIN 300 MG/1
300 CAPSULE ORAL
Status: DISCONTINUED | OUTPATIENT
Start: 2024-04-08 | End: 2024-04-09 | Stop reason: HOSPADM

## 2024-04-08 RX ORDER — CALCIUM CARBONATE 500 MG/1
1000 TABLET, CHEWABLE ORAL DAILY PRN
Status: DISCONTINUED | OUTPATIENT
Start: 2024-04-08 | End: 2024-04-09 | Stop reason: HOSPADM

## 2024-04-08 RX ADMIN — METOROPROLOL TARTRATE 1.5 MG: 5 INJECTION, SOLUTION INTRAVENOUS at 12:34

## 2024-04-08 RX ADMIN — FENTANYL CITRATE 25 MCG: 50 INJECTION INTRAMUSCULAR; INTRAVENOUS at 09:30

## 2024-04-08 RX ADMIN — OXYCODONE HYDROCHLORIDE 10 MG: 5 TABLET ORAL at 17:44

## 2024-04-08 RX ADMIN — TAMSULOSIN HYDROCHLORIDE 0.4 MG: 0.4 CAPSULE ORAL at 17:57

## 2024-04-08 RX ADMIN — ATORVASTATIN CALCIUM 80 MG: 40 TABLET, FILM COATED ORAL at 17:44

## 2024-04-08 RX ADMIN — PHENYLEPHRINE HYDROCHLORIDE 40 MCG/MIN: 10 INJECTION, SOLUTION INTRAVENOUS at 10:37

## 2024-04-08 RX ADMIN — FENTANYL CITRATE 12.5 MCG: 50 INJECTION INTRAMUSCULAR; INTRAVENOUS at 11:43

## 2024-04-08 RX ADMIN — FENTANYL CITRATE 12.5 MCG: 50 INJECTION INTRAMUSCULAR; INTRAVENOUS at 12:04

## 2024-04-08 RX ADMIN — PROPOFOL 100 MCG/KG/MIN: 10 INJECTION, EMULSION INTRAVENOUS at 10:30

## 2024-04-08 RX ADMIN — FENTANYL CITRATE 12.5 MCG: 50 INJECTION INTRAMUSCULAR; INTRAVENOUS at 12:22

## 2024-04-08 RX ADMIN — METOPROLOL SUCCINATE 25 MG: 25 TABLET, EXTENDED RELEASE ORAL at 15:50

## 2024-04-08 RX ADMIN — SODIUM CHLORIDE, SODIUM LACTATE, POTASSIUM CHLORIDE, AND CALCIUM CHLORIDE: .6; .31; .03; .02 INJECTION, SOLUTION INTRAVENOUS at 11:50

## 2024-04-08 RX ADMIN — METOROPROLOL TARTRATE 1 MG: 5 INJECTION, SOLUTION INTRAVENOUS at 12:44

## 2024-04-08 RX ADMIN — CHLORHEXIDINE GLUCONATE 15 ML: 1.2 SOLUTION ORAL at 08:31

## 2024-04-08 RX ADMIN — DULOXETINE 60 MG: 60 CAPSULE, DELAYED RELEASE ORAL at 15:51

## 2024-04-08 RX ADMIN — TRANEXAMIC ACID 1000 MG: 10 INJECTION, SOLUTION INTRAVENOUS at 10:40

## 2024-04-08 RX ADMIN — METHOCARBAMOL TABLETS 750 MG: 500 TABLET, COATED ORAL at 22:31

## 2024-04-08 RX ADMIN — WARFARIN SODIUM 5 MG: 5 TABLET ORAL at 17:45

## 2024-04-08 RX ADMIN — METOPROLOL SUCCINATE 25 MG: 25 TABLET, EXTENDED RELEASE ORAL at 21:33

## 2024-04-08 RX ADMIN — DIPHENHYDRAMINE HYDROCHLORIDE 25 MG: 50 INJECTION, SOLUTION INTRAMUSCULAR; INTRAVENOUS at 10:23

## 2024-04-08 RX ADMIN — OXYCODONE HYDROCHLORIDE 5 MG: 5 TABLET ORAL at 22:31

## 2024-04-08 RX ADMIN — BUPIVACAINE HYDROCHLORIDE IN DEXTROSE 2 ML: 7.5 INJECTION, SOLUTION SUBARACHNOID at 10:25

## 2024-04-08 RX ADMIN — FENTANYL CITRATE 12.5 MCG: 50 INJECTION INTRAMUSCULAR; INTRAVENOUS at 12:27

## 2024-04-08 RX ADMIN — METOROPROLOL TARTRATE 2.5 MG: 5 INJECTION, SOLUTION INTRAVENOUS at 12:29

## 2024-04-08 RX ADMIN — DEXAMETHASONE SODIUM PHOSPHATE 10 MG: 10 INJECTION, SOLUTION INTRAMUSCULAR; INTRAVENOUS at 10:50

## 2024-04-08 RX ADMIN — METHOCARBAMOL TABLETS 750 MG: 500 TABLET, COATED ORAL at 15:52

## 2024-04-08 RX ADMIN — MIDAZOLAM 2 MG: 1 INJECTION INTRAMUSCULAR; INTRAVENOUS at 09:30

## 2024-04-08 RX ADMIN — GABAPENTIN 300 MG: 300 CAPSULE ORAL at 21:33

## 2024-04-08 RX ADMIN — CEFAZOLIN SODIUM 1000 MG: 1 SOLUTION INTRAVENOUS at 18:04

## 2024-04-08 RX ADMIN — EZETIMIBE 10 MG: 10 TABLET ORAL at 15:51

## 2024-04-08 RX ADMIN — CEFAZOLIN SODIUM 2000 MG: 2 SOLUTION INTRAVENOUS at 10:35

## 2024-04-08 RX ADMIN — DOCUSATE SODIUM 100 MG: 100 CAPSULE, LIQUID FILLED ORAL at 17:43

## 2024-04-08 RX ADMIN — FENTANYL CITRATE 12.5 MCG: 50 INJECTION INTRAMUSCULAR; INTRAVENOUS at 11:11

## 2024-04-08 RX ADMIN — PHENYLEPHRINE HYDROCHLORIDE 20 MCG/MIN: 10 INJECTION, SOLUTION INTRAVENOUS at 10:33

## 2024-04-08 RX ADMIN — SODIUM CHLORIDE, SODIUM LACTATE, POTASSIUM CHLORIDE, AND CALCIUM CHLORIDE 75 ML/HR: .6; .31; .03; .02 INJECTION, SOLUTION INTRAVENOUS at 14:00

## 2024-04-08 RX ADMIN — SENNOSIDES 8.6 MG: 8.6 TABLET, FILM COATED ORAL at 15:50

## 2024-04-08 RX ADMIN — ACETAMINOPHEN 650 MG: 325 TABLET, FILM COATED ORAL at 17:43

## 2024-04-08 RX ADMIN — FOLIC ACID 1000 MCG: 1 TABLET ORAL at 15:51

## 2024-04-08 RX ADMIN — SODIUM CHLORIDE, SODIUM LACTATE, POTASSIUM CHLORIDE, AND CALCIUM CHLORIDE: .6; .31; .03; .02 INJECTION, SOLUTION INTRAVENOUS at 09:21

## 2024-04-08 RX ADMIN — FENTANYL CITRATE 12.5 MCG: 50 INJECTION INTRAMUSCULAR; INTRAVENOUS at 11:57

## 2024-04-08 RX ADMIN — ONDANSETRON 4 MG: 2 INJECTION INTRAMUSCULAR; INTRAVENOUS at 12:04

## 2024-04-08 RX ADMIN — SODIUM CHLORIDE, SODIUM LACTATE, POTASSIUM CHLORIDE, AND CALCIUM CHLORIDE 125 ML/HR: .6; .31; .03; .02 INJECTION, SOLUTION INTRAVENOUS at 20:46

## 2024-04-08 NOTE — OP NOTE
Brief Op Note  Tian Garcia  MRN: [id number]      Unit/Bed#: AN OR MAIN    PreOp Dx: right knee DJD      Postop Dx: right knee DJD      Procedure: right total knee arthroplasty      Surgeon: Dot Galindo MD      Assistants:Deepak Crane PA-C      No Qualified Resident Available for this Case. The physician assistant was needed for all portions of this case including prepping and draping the patient, as well as prepping and final implantation of the femoral, tibial, and patella components    Fluids:       EBL:       Drains: none      Specimens: No       Complications: No       Condition: stable transferred to postanesthesia care unit      Implants: Depuy Attune RP  Femoral, size: 9  Tibial tray: 8  Polyethylene: 5  Patellar button: 41      74 y.o.male, presents at this time, secondary to treatment of right knee DJD with valgus deformity which has failed conservative treatment.    The patient was told of all the pros and cons of operative and nonoperative intervention. Some of the complications of operative intervention include infection, bleeding, scarring, nerve injury, vascular injury, fracture, continued pain, decreased range of motion, DVT, PE death, loss of limb, need for further surgery, not obtain an results. The patient wished. Patient did consent for operative intervention for this pathology.    Patient was brought to the operating room. Patient was anesthetized as anesthesia team. Patient was prepped and draped in normal sterile fashion. After this was done, we did conduct a time out to make sure correct. Patient was in the room, prepped marked and draped. Implants were in the room, Rep. For the implants were present, DVT prophylaxis and antibiotics were addressed.    Midline incision was made over the anterior knee after going through skin, fatty tissue, fascia was identified. Flaps were created both medially and laterally. Medial parapatellar incision was made. Excision of Hoffa fat pad was  conducted. At this time because this was a varus knee, we did release to expose the medial and lateral tibial regions. We're able to excise the fatty tissue from the anterior aspect of the distal femur. We were able to at this time, flex the knee with the patella everted. Intramedullary reamer was used. Intramedullary guide for the femur was then placed to determine how much of the distal femur to cut and also, valgus cut angle. Pins were then placed. Intramedullary guide was removed. Distal femoral cut was made.  Attention was now to the proximal tibia. Extramedullary guide was used. After making sure that we took the appropriate amount from the medial and lateral side with the aid of a measuring device, the jig was held in place with pins. Protection of the medial and lateral ligaments, as well as the popliteal region, was done. Proximal tibial cut was made. Extension gaps were evaluated.  Size of the femur was then determined with the aid of a guide. After this was done, we placed the appropriate sized block. These were held down with pins. Anterior and posterior cuts were made. Anterior, posterior, chamfer cuts were made. We did check our flexion gap and was noted to be appropriate. This was a PCL sacrificing device being used Therefore a box cut was made.  Tibial tray size was determined. This was held down with 2 small screws. The reamer and the punch was then used with the appropriate guide to make sure that these were all done, the appropriate manner. Guide was removed. Trial femoral component was placed. Trial tibial polyethylene was placed. Patient was noted to be stable. On coronal and sagittal planes.   Patellar button size was determined after the articular surface of the patella was excised. The patella button was placed on the medial aspect of the patella. Holes were drilled for our true patella button to be cemented on. We tracked the knee, and we noted that the patella was tracking appropriately  over the trochlear of the trial implants. After this was done we did drill holes in our trial femoral component. We then removed. All trial components.  Copious irrigation was used at the operative site. We did place some bone within the intramedullary canal of the femur. High viscocity cement was used and all components were placed, including the femur, tibia, and patella button. A trial tibial Polyethylene was placed. After cement had dried, removed the trial polyethylene and removed any excess cement that was in the popliteal region. Copious irrigation was used. The tibial polyethylene was then placed. Patient was placed in the appropriate ranges of motion and patient's Knee was noted to be stable.  Tourniquet was discontinued. Hemostasis was obtained. #1 Vicryl sutures were used to reapproximate the parapatellar incision. 2-0 Vicryl sutures for the subcutaneous closure. This was reinforced with staples. Wounds were cleaned and dried. Acticoat, 4 x 4, ABDs and web roll was placed prior to Ace bandage be placed from the foot. All the way to the mid thigh region.  Patient was awakened as anesthesia team noted to be a stable condition and transferred to postanesthesia care unit

## 2024-04-08 NOTE — ASSESSMENT & PLAN NOTE
Lab Results   Component Value Date    EGFR 34 (L) 03/13/2024    EGFR 31 (L) 02/26/2024    EGFR 33 (L) 01/03/2024    CREATININE 2.03 (H) 03/13/2024    CREATININE 2.17 (H) 02/26/2024    CREATININE 2.10 (H) 01/03/2024   Stable within baseline  Hold demadex   Avoid NSAIDS and hypotension

## 2024-04-08 NOTE — INTERVAL H&P NOTE
H&P reviewed. After examining the patient I find no changes in the patients condition since the H&P had been written.    Vitals:    04/08/24 0823   BP: 135/76   Pulse: 73   Resp: 18   Temp: 97.6 °F (36.4 °C)   SpO2: 97%   Patient seen and examined  General: No apparent distress  CV: S1-S2  Abdomen: Soft  Chest: No audible wheezing  Right lower extremity: No abrasions or open wounds; minimal effusion; neurologically vascular intact distally  To the operating room for right total knee arthroplasty  Pros and cons of operative and nonoperative intervention discussed with patient  We will follow-up postoperatively

## 2024-04-08 NOTE — OCCUPATIONAL THERAPY NOTE
Occupational Therapy Evaluation     Patient Name: Tian Garcia  Today's Date: 4/8/2024  Problem List  Principal Problem:    Status post total knee replacement using cement, right  Active Problems:    Factor V Leiden mutation (HCC)    Paroxysmal A-fib (HCC)    Stage 3b chronic kidney disease (CKD) ([facility])    Atherosclerosis of native coronary artery of native heart without angina pectoris    IGT (impaired glucose tolerance)    Past Medical History  Past Medical History:   Diagnosis Date    Acute venous embolism and thrombosis of deep vessels of proximal lower extremity ([facility])     Last assessed: 5/18/15    JANEL (acute kidney injury) ([facility])     Arthritis     Cellulitis     LE    Chronic kidney disease 03/2020    Acute Kidney Injury    CPAP (continuous positive airway pressure) dependence     Factor V Leiden ([facility])     Forgetfulness     Gross hematuria 05/17/2022    Headache(784.0) 03/2022    Never till cervical  spine surgery    Heart murmur 03/2020    Told when in hospital    History of transfusion     [place] (hard of hearing)     Hypoalbuminemia 05/11/2022    Other acute osteomyelitis, other site ([facility]) 01/03/2023    Paroxysmal atrial fibrillation ([facility])     Last assessed: 11/2/15    Sleep apnea      Past Surgical History  Past Surgical History:   Procedure Laterality Date    BACK SURGERY      Lower    CARDIAC CATHETERIZATION N/A 04/09/2023    Procedure: Cardiac pci;  Surgeon: Bird Cast MD;  Location: [facility];  Service: Cardiology    CARDIAC CATHETERIZATION N/A 04/09/2023    Procedure: Cardiac Coronary Angiogram;  Surgeon: Bird Cast MD;  Location: [facility];  Service: Cardiology    CATARACT EXTRACTION      COLON SURGERY      COLONOSCOPY      INCISION AND DRAINAGE POSTERIOR SPINE N/A 04/01/2022    Procedure: Posterior cervical evacuation of postoperative collection and debridement with placement of drains C3-T1;  Surgeon: Rajeev Garcia MD;  Location: [facility] MA[facility];  Service:  Neurosurgery    IR BIOPSY KIDNEY RANDOM  05/26/2022    IR TUNNELED DIALYSIS CATHETER PLACEMENT  05/23/2022    IR TUNNELED DIALYSIS CATHETER REMOVAL  06/02/2022    OH ARTHRD ANT INTERBODY MIN DSC CRV BELOW C2 N/A 03/11/2022    Procedure: Anterior cervical discectomy and fixation fusion C5/6 and C6/7; Posterior cervical decompression and instrumented fusion C3-T1;  Surgeon: Rajeev Garcia MD;  Location: [facility];  Service: Neurosurgery    RENAL BIOPSY  6/2022    SPINE SURGERY  03/11/2022    Cervical myelopathy/ cervical fusion           04/08/24 1329   OT Last Visit   OT Visit Date 04/08/24   Note Type   Note type Evaluation   Pain Assessment   Pain Assessment Tool 0-10   Pain Score No Pain   Restrictions/Precautions   Weight Bearing Precautions Per Order Yes   RLE Weight Bearing Per Order WBAT   Home Living   Type of Home House   Home Layout Two level;Stairs to enter with rails;Bed/bath upstairs  (4 MICHELLE c B HR, full bathroom on 1st floor, plans to sleep in living room)   Bathroom Shower/Tub Walk-in shower   Bathroom Toilet Standard   Bathroom Equipment Shower chair;Commode  (reports having a lawn chair that fits in the bathroom)   Bathroom Accessibility Accessible via walker   Home Equipment Walker;Cane   Additional Comments no use of AD in home, use of rollator in community   Prior Function   Level of [place] Independent with ADLs   Lives With Spouse  (pt retports that wife recently underwent double lung transplant in January and is still slowly recovering)   Receives Help From Family  (son + DIL local)   IADLs Independent with meal prep;Independent with medication management;Independent with driving   Falls in the last 6 months 0   Vocational Retired   Lifestyle   Autonomy PTA pt living with wifein 2SH with FFSU possible, pt (I) with ADLs and IADLs, (-)falls, (+)drives use of rollator at baseline   Reciprocal Relationships supportive wife, son is local   Service to Others retired   Intrinsic  "Gratification enjoys hunting and fishing   General   Additional Pertinent History Admit due to elective R TKA with Dr. Galindo on 4/8, pt is WBAT and currently POD #0. PMH: cervical fusion in '22, a fib, CKD   Family/Caregiver Present No   Subjective   Subjective \"You can call me Tian, or Donovan or Mr. Garcia\"   ADL   Eating Assistance 7  Independent   Eating Deficit   (eating lunch at end of session)   Grooming Assistance 7  Independent   UB Bathing Assistance 7  Independent   LB Bathing Assistance 4  Minimal Assistance   UB Dressing Assistance 7  Independent   LB Dressing Assistance 4  Minimal Assistance   LB Dressing Deficit Increased time to complete;Verbal cueing;Supervision/safety;Thread RLE into underwear;Thread LLE into underwear;Pull up over hips   Toileting Assistance  5  Supervision/Setup   Additional Comments Did not observe grooming, UB bathing, LB bathing, UB dressing, and toileting at time of evaluation, with use of clinical reasoning, pt's performance throughout evaluation indicates that pt may be able to perform these tasks at the levels listed above   Bed Mobility   Supine to Sit 5  Supervision   Additional items Increased time required;Verbal cues   Transfers   Sit to Stand 5  Supervision   Additional items Increased time required;Verbal cues   Stand to Sit 5  Supervision   Additional items Increased time required;Verbal cues   Additional Comments use of RW   Functional Mobility   Functional Mobility 5  Supervision   Additional Comments functional household distance   Additional items Rolling walker   Balance   Static Sitting Good   Dynamic Sitting Fair +   Static Standing Fair   Dynamic Standing Fair   Ambulatory Fair   Activity Tolerance   Activity Tolerance Patient tolerated treatment well   Medical Staff Made Aware RN Ilsa, PT RJ   RUE Assessment   RUE Assessment WFL  (limited ROM at shoulder due to premorbid injury)   LUE Assessment   LUE Assessment WFL   Hand Function   Gross Motor " Coordination Functional   Fine Motor Coordination Functional   Cognition   Overall Cognitive Status WFL   Arousal/Participation Alert;Cooperative   Attention Within functional limits   Orientation Level Oriented X4   Memory Within functional limits   Following Commands Follows all commands and directions without difficulty   Comments pleasant and cooperative   Assessment   Limitation Decreased ADL status;Decreased endurance;Decreased self-care trans;Decreased high-level ADLs  (impaired pain, balance, act jaylon, standing jaylon, strength, pacing)   Prognosis Good   Assessment Pt is a 74 y.o. male seen for OT evaluation s/p admission to [facility] on 4/8/2024 due to elective R TKA, pt is POD #0 and is WBAT. Personal and env factors supporting pt at time of IE include (I) PLOF, supportive wife, accessible home environment, and FFSU. Personal and env factors inhibiting engagement in occupations include advanced age and difficulty completing IADLs. Performance deficits that affect the pt’s occupational performance can be seen above. Due to pt's current functional limitations and medical complications pt is functioning below baseline. Pt would benefit from continued skilled OT treatment in order to maximize safety, independence and overall performance with ADLs, functional mobility, and functional transfers in order to achieve highest level of function.   Goals   Patient Goals to be able to go fishing again   LTG Time Frame 10-14   Long Term Goal see goals listed below   Plan   Treatment Interventions ADL retraining;Functional transfer training;Endurance training;Patient/family training;Equipment evaluation/education;Compensatory technique education   Goal Expiration Date 04/18/24   OT Treatment Day 0   OT Frequency 3-5x/wk   Discharge Recommendation   Rehab Resource Intensity Level, OT No post-acute rehabilitation needs  (no OT needs)   AM-PAC Daily Activity Inpatient   Lower Body Dressing 3   Bathing 3   Toileting 3   Upper Body  Dressing 4   Grooming 4   Eating 4   Daily Activity Raw Score 21   Daily Activity Standardized Score (Calc for Raw Score >=11) 44.27   AM-PAC Applied Cognition Inpatient   Following a Speech/Presentation 4   Understanding Ordinary Conversation 4   Taking Medications 4   Remembering Where Things Are Placed or Put Away 4   Remembering List of 4-5 Errands 4   Taking Care of Complicated Tasks 4   Applied Cognition Raw Score 24   Applied Cognition Standardized Score 62.21   End of Consult   Patient Position at End of Consult Bedside chair;All needs within reach     GOALS:     -Patient will be Mod I with LB dressing with use of AE and AD as needed in order to increase (I) with ADLs    -Patient will be Mod I with LB bathing with use of AE and AD as needed in order to increase (I) with ADLs    -Patient will complete toileting w/ Mod I w/ G hygiene/thoroughness in order to reduce caregiver burden    -Patient will demonstrate Mod I with bed mobility for ability to manage own comfort and initiate OOB tasks.     -Patient will perform functional transfers with Mod I to/from all surfaces using DME as needed in order to increase (I) with functional tasks    -Patient will be Mod I with functional mobility to/from bathroom for increased independence with toileting tasks    -Patient will complete simulated tub/shower transfer with overall mod I in order to safely participate in bathing tasks upon d/c home    The patient's raw score on the AM-PAC Daily Activity Inpatient Short Form is 21. A raw score of greater than or equal to 19 suggests the patient may benefit from discharge to home. Please refer to the recommendation of the Occupational Therapist for safe discharge planning.    This session, pt required and most appropriately benefited from skilled OT/PT co-eval due to unpredictable medical and/or functional status. OT and PT goals were addressed separately as seen in documentation.     Divine Cain MS, OTR/L

## 2024-04-08 NOTE — DISCHARGE INSTR - AVS FIRST PAGE
Discharge Instructions - Orthopedics  Tian Garcia 74 y.o. male MRN: [id number]  Unit/Bed#: AN OR MAIN    Weight Bearing Status:                                           Weight Bearing as tolerated to the right lower extremity.    DVT prophylaxis:  Complete course of Lovenox injections and coumadin as directed    Pain:  Start oxycodone 5 mg every 6 hrs after last dose taken after surgery for 1 day  Transition to oxycodone 5 mg every 6 hours as needed    Showering Instructions:   Do not shower until 5 days from date of surgery  Do not soak your incision(Tubs, Jacuzzi's, pools, ext)    Dressing Instructions:   May remove dressing 5 days from date of surgery OR may leave dressing in place until follow up office visit 2 weeks from surgery date  Keep dressing/incision clean and intact until follow up appointment.  Do not apply any creams or ointments/lotions to your incisions including antibiotic ointment, peroxide    Driving Instructions:  No driving until cleared by Orthopaedic Surgery.    PT/OT:  Continue PT/OT on outpatient basis as directed  Continue motion and strengthening exercises while at home    Appt Instructions:   If you do not have your appointment, please call the clinic at [phone]  Otherwise followup as scheduled    Contact the office sooner if you experience any increased numbness/tingling in the extremities.           Take Coumadin 2.5 mg nightly and continue your Lovenox injections as already directed by your family doctor.  Go get a repeat INR done Thursday or Friday and discussed with PCP

## 2024-04-08 NOTE — ANESTHESIA PROCEDURE NOTES
Peripheral Block    Patient location during procedure: holding area  Start time: 4/8/2024 9:45 AM  Reason for block: procedure for pain, at surgeon's request and post-op pain management  Staffing  Performed by: Heidi Ulrich MD  Authorized by: Heidi Ulrich MD    Preanesthetic Checklist  Completed: patient identified, IV checked, site marked, risks and benefits discussed, surgical consent, monitors and equipment checked, pre-op evaluation and timeout performed  Peripheral Block  Patient position: supine  Prep: ChloraPrep  Patient monitoring: frequent blood pressure checks, continuous pulse oximetry and heart rate  Block type: IPACK  Laterality: right  Injection technique: single-shot  Procedures: ultrasound guided, Ultrasound guidance required for the procedure to increase accuracy and safety of medication placement and decrease risk of complications.  Ultrasound permanent image saved  Needle  Needle type: Stimuplex   Needle localization: anatomical landmarks and ultrasound guidance  Assessment  Injection assessment: incremental injection, frequent aspiration, injected with ease, negative aspiration, negative for heart rate change, no paresthesia on injection, no symptoms of intraneural/intravenous injection and needle tip visualized at all times  Paresthesia pain: immediately resolved  Post-procedure:  site cleaned and adhesive bandage applied  patient tolerated the procedure well with no immediate complications  Additional Notes  Single needle pass, needle visualized throughout, minimal resistance on injection, appropriate perineural spread

## 2024-04-08 NOTE — ANESTHESIA PROCEDURE NOTES
Spinal Block    Patient location during procedure: OR  Start time: 4/8/2024 10:25 AM  Reason for block: at surgeon's request and primary anesthetic  Staffing  Performed by: Marcy Sampson CRNA  Authorized by: Heidi Ulrich MD    Preanesthetic Checklist  Completed: patient identified, IV checked, risks and benefits discussed, surgical consent, monitors and equipment checked, pre-op evaluation and timeout performed  Spinal Block  Patient position: sitting  Prep: ChloraPrep  Patient monitoring: heart rate, cardiac monitor, continuous pulse ox and frequent blood pressure checks  Approach: midline  Location: L3-4  Injection technique: single-shot  Needle  Needle type: pencil-tip   Needle gauge: 24 G  Needle length: 10 cm  Assessment  Sensory level: T10  Injection Assessment:  negative aspiration for heme, no paresthesia on injection and positive aspiration for clear CSF.

## 2024-04-08 NOTE — ASSESSMENT & PLAN NOTE
April 2023, NSTEMI s/p Successful PCI w/ non-overlapping ROBERT x2 of sequential prox & mid LAD culprit lesions. Residual mid RCA 80% diffuse lesion  Statin, BB, aspirin

## 2024-04-08 NOTE — PLAN OF CARE
Problem: OCCUPATIONAL THERAPY ADULT  Goal: Performs self-care activities at highest level of function for planned discharge setting.  See evaluation for individualized goals.  Description: Treatment Interventions: ADL retraining, Functional transfer training, Endurance training, Patient/family training, Equipment evaluation/education, Compensatory technique education          See flowsheet documentation for full assessment, interventions and recommendations.   Note: Limitation: Decreased ADL status, Decreased endurance, Decreased self-care trans, Decreased high-level ADLs (impaired pain, balance, act jaylon, standing jaylon, strength, pacing)  Prognosis: Good  Assessment: Pt is a 74 y.o. male seen for OT evaluation s/p admission to [facility] on 4/8/2024 due to elective R TKA, pt is POD #0 and is WBAT. Personal and env factors supporting pt at time of IE include (I) PLOF, supportive wife, accessible home environment, and FFSU. Personal and env factors inhibiting engagement in occupations include advanced age and difficulty completing IADLs. Performance deficits that affect the pt’s occupational performance can be seen above. Due to pt's current functional limitations and medical complications pt is functioning below baseline. Pt would benefit from continued skilled OT treatment in order to maximize safety, independence and overall performance with ADLs, functional mobility, and functional transfers in order to achieve highest level of function.     Rehab Resource Intensity Level, OT: No post-acute rehabilitation needs (no OT needs)

## 2024-04-08 NOTE — ANESTHESIA PROCEDURE NOTES
Peripheral Block    Patient location during procedure: holding area  Start time: 4/8/2024 9:45 AM  Reason for block: procedure for pain, at surgeon's request and post-op pain management  Staffing  Performed by: Heidi Ulrich MD  Authorized by: Heidi Ulrich MD    Preanesthetic Checklist  Completed: patient identified, IV checked, site marked, risks and benefits discussed, surgical consent, monitors and equipment checked, pre-op evaluation and timeout performed  Peripheral Block  Patient position: supine  Prep: ChloraPrep  Patient monitoring: frequent blood pressure checks, continuous pulse oximetry and heart rate  Block type: Adductor Canal  Laterality: right  Injection technique: single-shot  Procedures: ultrasound guided, Ultrasound guidance required for the procedure to increase accuracy and safety of medication placement and decrease risk of complications.  Ultrasound permanent image saved  Needle  Needle type: Stimuplex   Needle localization: anatomical landmarks and ultrasound guidance  Assessment  Injection assessment: incremental injection, frequent aspiration, injected with ease, negative aspiration, negative for heart rate change, no paresthesia on injection, no symptoms of intraneural/intravenous injection and needle tip visualized at all times  Paresthesia pain: immediately resolved  Post-procedure:  site cleaned and adhesive bandage applied  patient tolerated the procedure well with no immediate complications  Additional Notes  Single needle pass, needle visualized throughout, minimal resistance on injection, appropriate perineural spread

## 2024-04-08 NOTE — ASSESSMENT & PLAN NOTE
With history of DVT  On coumadin longstanding at a dose of 2.5 mg daily, held pre-op. Resume tonight at 5 mg  Lovenox bridge as directed by PCP and trend INR daily

## 2024-04-08 NOTE — PHYSICAL THERAPY NOTE
PHYSICAL THERAPY EVALUATION NOTE    Patient Name: Tian Garcia  Today's Date: 4/8/2024  AGE:   74 y.o.  Mrn:   [id number]  ADMIT DX:  Primary osteoarthritis of right knee [M17.11]    Past Medical History:   Diagnosis Date    Acute venous embolism and thrombosis of deep vessels of proximal lower extremity (HCC)     Last assessed: 5/18/15    JANEL (acute kidney injury) ([facility])     Arthritis     Cellulitis     LE    Chronic kidney disease 03/2020    Acute Kidney Injury    CPAP (continuous positive airway pressure) dependence     Factor V Leiden ([facility])     Forgetfulness     Gross hematuria 05/17/2022    Headache(784.0) 03/2022    Never till cervical  spine surgery    Heart murmur 03/2020    Told when in hospital    History of transfusion     [place] (hard of hearing)     Hypoalbuminemia 05/11/2022    Other acute osteomyelitis, other site ([facility]) 01/03/2023    Paroxysmal atrial fibrillation ([facility])     Last assessed: 11/2/15    Sleep apnea      Length Of Stay: 0  PHYSICAL THERAPY EVALUATION :    04/08/24 1549   PT Last Visit   PT Visit Date 04/08/24   Pain Assessment   Pain Assessment Tool 0-10   Pain Score No Pain   Restrictions/Precautions   RLE Weight Bearing Per Order WBAT   Other Precautions WBS;Fall Risk;Pain;Multiple lines   Home Living   Type of Home House   Home Layout Two level;Bed/bath upstairs;Able to live on main level with bedroom/bathroom  (4 MICHELLE w/ bilateral railings. full bathroom on 1st floor. pt plans to sleep in living room.)   Additional Comments lives w/ spouse. ambulates w/o device. owns cane, bedside commode, and walker. independent w/ ADLs and IADLs. no falls in last 6 months.   General   Additional Pertinent History 4/8/24 at 12:45 heart rate was 124 BPM.   Family/Caregiver Present No   Cognition   Arousal/Participation Alert   Orientation Level Oriented to person;Other (Comment)  (pt was identified w/ full name, birth date)    Following Commands Follows one step commands without difficulty   Comments supine blood pressure 98/59, seated out of bed at end of session 125/81. room air resting pulse ox 95% and 99 BPM.   Subjective   Subjective pt seen sitting on edge of bed w/ [facility] present. pt agreed to PT eval. denied pain or dizziness. reports having mild numbness of feet.   RLE Assessment   RLE Assessment X  (hip flexion 3/5 extension 3+/5, knee 3-/5, ankle 3/5)   LLE Assessment   LLE Assessment WFL  (5/5)   Coordination   Sensation X  (light touch impaired bilateral feet)   Transfers   Sit to Stand 5  Supervision   Additional items Increased time required;Verbal cues  (for hand placement, LE positioning)   Stand to Sit 5  Supervision   Additional items Increased time required;Verbal cues  (for body positioning, hand placement)   Toilet transfer 5  Supervision   Additional items Standard toilet;Increased time required;Verbal cues  (for body positioning, right grab bar use)   Ambulation/Elevation   Gait pattern Foward flexed;Short stride;Decreased R stance;Excessively slow   Gait Assistance 5  Supervision   Additional items Verbal cues  (for walker positioning, sequencing)   Assistive Device Rolling walker   Distance 40 feet x2 w/ seated rest break  (additional ambulation not possible due to fatigue)   Balance   Static Sitting Good   Dynamic Sitting Fair +   Static Standing Fair  (w/ roller walker)   Ambulatory Fair -  (w/ roller walker)   Activity Tolerance   Activity Tolerance Patient limited by fatigue   Nurse Made Aware spoke to [facility]   Assessment   Problem List Decreased strength;Decreased endurance;Decreased range of motion;Impaired balance;Decreased mobility;Decreased safety awareness;Impaired sensation;Obesity   Assessment Pt presents secondary to treatment of right knee DJD with valgus deformity which has failed conservative treatment. Dx: right knee DJD, a-fib, factor V Leiden mutation, CKD, impaired  glucose tolerance, and atherosclerosis. 4/8/24 right TKA. Order placed for PT eval and tx, w/ activity order of up w/ assist and WBAT R LE. pt presents w/ comorbidities of factor V Leiden, a-fib, DVT, arthritis, cellulitis, CKD, osteomyelitis, back surgery, and CAD and personal factors of living in 2 story house and stair(s) to enter home. pt presents w/ weakness, decreased ROM, decreased endurance, impaired balance, gait deviations, altered sensation, decreased safety awareness, and fall risk. these impairments are evident in findings from physical examination (weakness, decreased ROM, and altered sensation), mobility assessment (need for standby assist w/ all phases of mobility when usually mobilizing independently, tolerance to only 40 feet of ambulation, and need for cueing for mobility technique), and Barthel Index: 55/100. pt needed input for mobility technique. pt is at risk for falls due to physical and safety awareness deficits. pt's clinical presentation is unstable/unpredictable (evident in tachycardia, poor blood pressure control, need for standbyassist w/ all phases of mobility when usually mobilizing independently, tolerance to only 40 feet of ambulation, and need for input for mobility technique/safety). pt needs inpatient PT tx to improve mobility deficits and progress mobility training as appropriate.   Goals   Patient Goals go fishing and hunting.   STG Expiration Date 04/18/24   Short Term Goal #1 pt will: Increase R LE strength 1/2 grade to facilitate independent mobility, Perform bed mobility independently to increase level of independence, Perform all transfers independently to improve independence, Ambulate 300 ft. with roller walker independently w/o LOB to improve functional independence, Navigate 4 stair(s) independently with bilateral handrails to facilitate return to previous living environment, Increase ambulatory balance 1/2 grade to decrease risk for falls, Complete exercise program  independently to increase strength and endurance, Tolerate 3 hr OOB to faciliate upright tolerance, Improve Barthel Index score to 75 or greater to facilitate independence, and Complete Timed Up and Go or Comfortable Gait Speed to further assess mobility and monitor progress   PT Treatment Day 0   Plan   Treatment/Interventions Functional transfer training;LE strengthening/ROM;Elevations;Therapeutic exercise;Endurance training;Gait training;Bed mobility;Equipment eval/education;Patient/family training   PT Frequency Twice a day   Discharge Recommendation   Rehab Resource Intensity Level, PT III (Minimum Resource Intensity)   AM-PAC Basic Mobility Inpatient   Turning in Flat Bed Without Bedrails 4   Lying on Back to Sitting on Edge of Flat Bed Without Bedrails 3   Moving Bed to Chair 3   Standing Up From Chair Using Arms 3   Walk in Room 3   Climb 3-5 Stairs With Railing 2   Basic Mobility Inpatient Raw Score 18   Basic Mobility Standardized Score 41.05   [facility] Level Of Mobility   -HLM Goal 6: Walk 10 steps or more   -HLM Achieved 7: Walk 25 feet or more   Barthel Index   Feeding 10   Bathing 0   Grooming Score 5   Dressing Score 5   Bladder Score 10   Bowels Score 10   Toilet Use Score 5   Transfers (Bed/Chair) Score 10   Mobility (Level Surface) Score 0   Stairs Score 0   Barthel Index Score 55   End of Consult   Patient Position at End of Consult Bedside chair;All needs within reach     The patient's AM-PAC Basic Mobility Inpatient Short Form Raw Score is 18. A Raw score of greater than 16 suggests the patient may benefit from discharge to home. Please also refer to the recommendation of the Physical Therapist for safe discharge planning.    Skilled PT recommended while in hospital and upon DC to progress pt toward treatment goals.     Francesco Yee, PT

## 2024-04-08 NOTE — CONSULTS
[facility]  Consult  Name: Tian Garcia 74 y.o. male I MRN: [id number]  Unit/Bed#: OR POOL I Date of Admission: 4/8/2024   Date of Service: 4/8/2024 I Hospital Day: 0    Inpatient consult to Internal Medicine  Consult performed by: Malika Portillo PA-C  Consult ordered by: Deepak Crane PA-C          Assessment/Plan   * Status post total knee replacement using cement, right  Assessment & Plan  POD #0 --mgmt per ortho  Pain control  PT    Paroxysmal A-fib (HCC)  Assessment & Plan  Reports resting heart rate is usually in the 50s. Rates in PACU fluctuating but overall elevated at this time, though he is asymptomatic. He received IV lopressor during surgery  Continue toprol 25 BID, give dose now  Coumadin held pre op, resume    Factor V Leiden mutation ([facility])  Assessment & Plan  With history of DVT  On coumadin longstanding at a dose of 2.5 mg daily, held pre-op. Resume tonight at 5 mg  Lovenox bridge as directed by PCP and trend INR daily    Stage 3b chronic kidney disease (CKD) ([facility])  Assessment & Plan  Lab Results   Component Value Date    EGFR 34 (L) 03/13/2024    EGFR 31 (L) 02/26/2024    EGFR 33 (L) 01/03/2024    CREATININE 2.03 (H) 03/13/2024    CREATININE 2.17 (H) 02/26/2024    CREATININE 2.10 (H) 01/03/2024   Stable within baseline  Hold demadex   Avoid NSAIDS and hypotension    IGT (impaired glucose tolerance)  Assessment & Plan  Due for A1c, can be done outpatient  Hold mounjaro    Atherosclerosis of native coronary artery of native heart without angina pectoris  Assessment & Plan  April 2023, NSTEMI s/p Successful PCI w/ non-overlapping ROBERT x2 of sequential prox & mid LAD culprit lesions. Residual mid RCA 80% diffuse lesion  Statin, BB, aspirin         VTE Prophylaxis:   lovenox    Mobility:      PT    Recommendations for Discharge:  Per primary team, observe overnight    Total Time Spent on Date of Encounter in care of patient: 40 mins. This time was spent on one or  more of the following: performing physical exam; counseling and coordination of care; obtaining or reviewing history; documenting in the medical record; reviewing/ordering tests, medications or procedures; communicating with other healthcare professionals and discussing with patient's family/caregivers. Case d/w PACU RN    Collaboration of Care: Were Recommendations Directly Discussed with Primary Treatment Team? Yes    History of Present Illness:  Tian Garcia is a 74 y.o. male with history of factor 5 Leiden, afib and CAD who was originally admitted to the orthopedic service for TKR after having failed conservative treatment. We are consulted for medical management. Currently reports he is a little groggy post op but otherwise feeling well with no pain. He is not aware of any fast heart rate symptoms at this time.    Review of Systems:  Review of Systems   Constitutional:  Negative for activity change, appetite change, chills, diaphoresis, fatigue, fever and unexpected weight change.   HENT:  Negative for congestion, sore throat and trouble swallowing.    Respiratory:  Negative for cough, choking, chest tightness, shortness of breath, wheezing and stridor.    Cardiovascular:  Negative for chest pain, palpitations and leg swelling.   Gastrointestinal:  Negative for abdominal pain, blood in stool, constipation, diarrhea, nausea and vomiting.   Genitourinary:  Negative for difficulty urinating.   Musculoskeletal:  Positive for arthralgias.   Skin:  Negative for color change, pallor, rash and wound.   Neurological:  Negative for dizziness, tremors, syncope, speech difficulty, weakness, light-headedness and headaches.   Psychiatric/Behavioral:  Negative for confusion.        Past Medical and Surgical History:   Past Medical History:   Diagnosis Date    Acute venous embolism and thrombosis of deep vessels of proximal lower extremity (HCC)     Last assessed: 5/18/15    JANEL (acute kidney injury) (HCC)     Arthritis      Cellulitis     LE    Chronic kidney disease 03/2020    Acute Kidney Injury    CPAP (continuous positive airway pressure) dependence     Factor V Leiden (HCC)     Forgetfulness     Gross hematuria 05/17/2022    Headache(784.0) 03/2022    Never till cervical  spine surgery    Heart murmur 03/2020    Told when in hospital    History of transfusion     [place] (hard of hearing)     Hypoalbuminemia 05/11/2022    Other acute osteomyelitis, other site (HCC) 01/03/2023    Paroxysmal atrial fibrillation (HCC)     Last assessed: 11/2/15    Sleep apnea        Past Surgical History:   Procedure Laterality Date    BACK SURGERY      Lower    CARDIAC CATHETERIZATION N/A 04/09/2023    Procedure: Cardiac pci;  Surgeon: Bird Cast MD;  Location: [facility];  Service: Cardiology    CARDIAC CATHETERIZATION N/A 04/09/2023    Procedure: Cardiac Coronary Angiogram;  Surgeon: Bird Cast MD;  Location: [facility];  Service: Cardiology    CATARACT EXTRACTION      COLON SURGERY      COLONOSCOPY      INCISION AND DRAINAGE POSTERIOR SPINE N/A 04/01/2022    Procedure: Posterior cervical evacuation of postoperative collection and debridement with placement of drains C3-T1;  Surgeon: Rajeev Garcia MD;  Location: [facility];  Service: Neurosurgery    IR BIOPSY KIDNEY RANDOM  05/26/2022    IR TUNNELED DIALYSIS CATHETER PLACEMENT  05/23/2022    IR TUNNELED DIALYSIS CATHETER REMOVAL  06/02/2022    MT ARTHRD ANT INTERBODY MIN DSC CRV BELOW C2 N/A 03/11/2022    Procedure: Anterior cervical discectomy and fixation fusion C5/6 and C6/7; Posterior cervical decompression and instrumented fusion C3-T1;  Surgeon: Rajeev Garcia MD;  Location: [facility];  Service: Neurosurgery    RENAL BIOPSY  6/2022    SPINE SURGERY  03/11/2022    Cervical myelopathy/ cervical fusion       Meds/Allergies:  all medications and allergies reviewed    Allergies:   Allergies   Allergen Reactions    Morphine GI Intolerance    Vitamin K  "Other (See Comments)     On coumadin-patient sensitive to vitamin K amounts in meds etc.       Social History:  Marital Status: /Civil Union  Substance Use History:   Social History     Substance and Sexual Activity   Alcohol Use Not Currently     Social History     Tobacco Use   Smoking Status Never   Smokeless Tobacco Never     Social History     Substance and Sexual Activity   Drug Use No       Family History:  noncontributory    Physical Exam:   Vitals:   Blood Pressure: 115/82 (04/08/24 1338)  Pulse: 88 (04/08/24 1338)  Temperature: (!) 97.4 °F (36.3 °C) (04/08/24 1241)  Temp Source: Temporal (04/08/24 1338)  Respirations: 17 (04/08/24 1338)  Height: 5' 11\" (180.3 cm) (04/08/24 0823)  Weight - Scale: 106 kg (233 lb) (04/08/24 0823)  SpO2: 100 % (04/08/24 1338)    Physical Exam  Vitals reviewed.   Constitutional:       General: He is not in acute distress.     Appearance: He is obese. He is not ill-appearing, toxic-appearing or diaphoretic.   HENT:      Mouth/Throat:      Pharynx: No oropharyngeal exudate.   Eyes:      General: No scleral icterus.        Right eye: No discharge.         Left eye: No discharge.      Conjunctiva/sclera: Conjunctivae normal.   Cardiovascular:      Rate and Rhythm: Tachycardia present. Rhythm irregular.      Heart sounds: No murmur heard.  Pulmonary:      Effort: No respiratory distress.      Breath sounds: No stridor. No wheezing, rhonchi or rales.   Abdominal:      General: There is no distension.      Palpations: Abdomen is soft.      Tenderness: There is no abdominal tenderness. There is no guarding.   Musculoskeletal:      Right lower leg: No edema.      Left lower leg: No edema.   Skin:     General: Skin is warm and dry.      Coloration: Skin is not jaundiced or pale.      Findings: No bruising, erythema, lesion or rash.   Neurological:      General: No focal deficit present.      Mental Status: He is alert. Mental status is at baseline.      Comments: Awake alert " interactive   Psychiatric:         Mood and Affect: Mood normal.          Additional Data:   Lab Results:            Results from last 7 days   Lab Units 04/08/24  0827   INR  1.02         Lab Results   Component Value Date/Time    HGBA1C 6.2 (H) 04/09/2023 07:02 PM    HGBA1C 6.3 (H) 03/05/2022 04:14 AM    HGBA1C 6.2 (H) 11/02/2021 01:02 PM    HGBA1C 6.4 (H) 06/23/2021 09:46 AM    HGBA1C 6.2 (H) 12/09/2020 11:04 AM     Results from last 7 days   Lab Units 04/08/24  0827   POC GLUCOSE mg/dl 112           Imaging:   No orders to display         ** Please Note: This note may have been constructed using a voice recognition system. **

## 2024-04-08 NOTE — ASSESSMENT & PLAN NOTE
Problem: PHYSICAL THERAPY ADULT  Goal: Performs mobility at highest level of function for planned discharge setting.  See evaluation for individualized goals.  Description: Treatment/Interventions: Functional transfer training, LE strengthening/ROM, Elevations, Therapeutic exercise, Endurance training, Gait training, Bed mobility, Equipment eval/education, Patient/family training          See flowsheet documentation for full assessment, interventions and recommendations.  Note:    Problem List: Decreased strength, Decreased endurance, Decreased range of motion, Impaired balance, Decreased mobility, Decreased safety awareness, Impaired sensation, Obesity  Assessment: Pt presents secondary to treatment of right knee DJD with valgus deformity which has failed conservative treatment. Dx: right knee DJD, a-fib, factor V Leiden mutation, CKD, impaired glucose tolerance, and atherosclerosis. 4/8/24 right TKA. Order placed for PT eval and tx, w/ activity order of up w/ assist and WBAT R LE. pt presents w/ comorbidities of factor V Leiden, a-fib, DVT, arthritis, cellulitis, CKD, osteomyelitis, back surgery, and CAD and personal factors of living in 2 story house and stair(s) to enter home. pt presents w/ weakness, decreased ROM, decreased endurance, impaired balance, gait deviations, altered sensation, decreased safety awareness, and fall risk. these impairments are evident in findings from physical examination (weakness, decreased ROM, and altered sensation), mobility assessment (need for standby assist w/ all phases of mobility when usually mobilizing independently, tolerance to only 40 feet of ambulation, and need for cueing for mobility technique), and Barthel Index: 55/100. pt needed input for mobility technique. pt is at risk for falls due to physical and safety awareness deficits. pt's clinical presentation is unstable/unpredictable (evident in tachycardia, poor blood pressure control, need for standbyassist w/ all  · BP acceptable, monitor routinely   · Continue home dose Norvasc, metoprolol phases of mobility when usually mobilizing independently, tolerance to only 40 feet of ambulation, and need for input for mobility technique/safety). pt needs inpatient PT tx to improve mobility deficits and progress mobility training as appropriate.        Rehab Resource Intensity Level, PT: III (Minimum Resource Intensity)    See flowsheet documentation for full assessment.

## 2024-04-08 NOTE — ASSESSMENT & PLAN NOTE
Reports resting heart rate is usually in the 50s. Rates in PACU fluctuating but overall elevated at this time, though he is asymptomatic. He received IV lopressor during surgery  Continue toprol 25 BID, give dose now  Coumadin held pre op, resume

## 2024-04-08 NOTE — ANESTHESIA POSTPROCEDURE EVALUATION
Post-Op Assessment Note    CV Status:  Stable  Pain Score: 0    Pain management: adequate       Mental Status:  Alert   Hydration Status:  Stable   PONV Controlled:  None   Airway Patency:  Patent     Post Op Vitals Reviewed: Yes    No anethesia notable event occurred.    Staff: CRNA               BP   142/86   Temp   97   Pulse  140   Resp   14   SpO2   93

## 2024-04-09 VITALS
BODY MASS INDEX: 32.62 KG/M2 | HEART RATE: 89 BPM | TEMPERATURE: 98.1 F | WEIGHT: 233 LBS | HEIGHT: 71 IN | SYSTOLIC BLOOD PRESSURE: 113 MMHG | DIASTOLIC BLOOD PRESSURE: 84 MMHG | OXYGEN SATURATION: 98 % | RESPIRATION RATE: 16 BRPM

## 2024-04-09 LAB
ANION GAP SERPL CALCULATED.3IONS-SCNC: 6 MMOL/L (ref 4–13)
BUN SERPL-MCNC: 32 MG/DL (ref 5–25)
CALCIUM SERPL-MCNC: 8.8 MG/DL (ref 8.4–10.2)
CHLORIDE SERPL-SCNC: 104 MMOL/L (ref 96–108)
CO2 SERPL-SCNC: 27 MMOL/L (ref 21–32)
CREAT SERPL-MCNC: 1.85 MG/DL (ref 0.6–1.3)
ERYTHROCYTE [DISTWIDTH] IN BLOOD BY AUTOMATED COUNT: 13.6 % (ref 11.6–15.1)
GFR SERPL CREATININE-BSD FRML MDRD: 35 ML/MIN/1.73SQ M
GLUCOSE SERPL-MCNC: 179 MG/DL (ref 65–140)
HCT VFR BLD AUTO: 32.3 % (ref 36.5–49.3)
HGB BLD-MCNC: 10.1 G/DL (ref 12–17)
INR PPP: 1.15 (ref 0.84–1.19)
MCH RBC QN AUTO: 29.6 PG (ref 26.8–34.3)
MCHC RBC AUTO-ENTMCNC: 31.3 G/DL (ref 31.4–37.4)
MCV RBC AUTO: 95 FL (ref 82–98)
PLATELET # BLD AUTO: 139 THOUSANDS/UL (ref 149–390)
PMV BLD AUTO: 10.8 FL (ref 8.9–12.7)
POTASSIUM SERPL-SCNC: 3.9 MMOL/L (ref 3.5–5.3)
PROTHROMBIN TIME: 15.3 SECONDS (ref 11.6–14.5)
RBC # BLD AUTO: 3.41 MILLION/UL (ref 3.88–5.62)
SODIUM SERPL-SCNC: 137 MMOL/L (ref 135–147)
WBC # BLD AUTO: 13.31 THOUSAND/UL (ref 4.31–10.16)

## 2024-04-09 PROCEDURE — 80048 BASIC METABOLIC PNL TOTAL CA: CPT | Performed by: PHYSICIAN ASSISTANT

## 2024-04-09 PROCEDURE — 99214 OFFICE O/P EST MOD 30 MIN: CPT | Performed by: STUDENT IN AN ORGANIZED HEALTH CARE EDUCATION/TRAINING PROGRAM

## 2024-04-09 PROCEDURE — 97535 SELF CARE MNGMENT TRAINING: CPT

## 2024-04-09 PROCEDURE — 85027 COMPLETE CBC AUTOMATED: CPT | Performed by: PHYSICIAN ASSISTANT

## 2024-04-09 PROCEDURE — 97110 THERAPEUTIC EXERCISES: CPT

## 2024-04-09 PROCEDURE — 97116 GAIT TRAINING THERAPY: CPT

## 2024-04-09 PROCEDURE — NC001 PR NO CHARGE: Performed by: STUDENT IN AN ORGANIZED HEALTH CARE EDUCATION/TRAINING PROGRAM

## 2024-04-09 PROCEDURE — 99232 SBSQ HOSP IP/OBS MODERATE 35: CPT | Performed by: PHYSICIAN ASSISTANT

## 2024-04-09 PROCEDURE — 99222 1ST HOSP IP/OBS MODERATE 55: CPT | Performed by: STUDENT IN AN ORGANIZED HEALTH CARE EDUCATION/TRAINING PROGRAM

## 2024-04-09 PROCEDURE — 99024 POSTOP FOLLOW-UP VISIT: CPT | Performed by: PHYSICIAN ASSISTANT

## 2024-04-09 PROCEDURE — 85610 PROTHROMBIN TIME: CPT | Performed by: PHYSICIAN ASSISTANT

## 2024-04-09 RX ORDER — OXYCODONE HYDROCHLORIDE 5 MG/1
TABLET ORAL
Qty: 30 TABLET | Refills: 0 | Status: SHIPPED | OUTPATIENT
Start: 2024-04-09

## 2024-04-09 RX ORDER — SENNOSIDES 8.6 MG
650 CAPSULE ORAL EVERY 8 HOURS PRN
Qty: 30 TABLET | Refills: 0 | Status: SHIPPED | OUTPATIENT
Start: 2024-04-09 | End: 2024-05-09

## 2024-04-09 RX ORDER — METOCLOPRAMIDE HYDROCHLORIDE 5 MG/ML
5 INJECTION INTRAMUSCULAR; INTRAVENOUS EVERY 6 HOURS PRN
Status: DISCONTINUED | OUTPATIENT
Start: 2024-04-09 | End: 2024-04-09 | Stop reason: HOSPADM

## 2024-04-09 RX ORDER — DOCUSATE SODIUM 100 MG/1
100 CAPSULE, LIQUID FILLED ORAL 2 TIMES DAILY
Qty: 10 CAPSULE | Refills: 0 | Status: SHIPPED | OUTPATIENT
Start: 2024-04-09

## 2024-04-09 RX ORDER — ONDANSETRON 4 MG/1
4 TABLET, FILM COATED ORAL EVERY 8 HOURS PRN
Qty: 5 TABLET | Refills: 0 | Status: SHIPPED | OUTPATIENT
Start: 2024-04-09

## 2024-04-09 RX ADMIN — METHOCARBAMOL TABLETS 750 MG: 500 TABLET, COATED ORAL at 09:13

## 2024-04-09 RX ADMIN — FOLIC ACID 1000 MCG: 1 TABLET ORAL at 08:06

## 2024-04-09 RX ADMIN — DOCUSATE SODIUM 100 MG: 100 CAPSULE, LIQUID FILLED ORAL at 08:06

## 2024-04-09 RX ADMIN — DULOXETINE 60 MG: 60 CAPSULE, DELAYED RELEASE ORAL at 08:06

## 2024-04-09 RX ADMIN — CEFAZOLIN SODIUM 1000 MG: 1 SOLUTION INTRAVENOUS at 03:41

## 2024-04-09 RX ADMIN — METOPROLOL SUCCINATE 25 MG: 25 TABLET, EXTENDED RELEASE ORAL at 08:06

## 2024-04-09 RX ADMIN — SENNOSIDES 8.6 MG: 8.6 TABLET, FILM COATED ORAL at 08:06

## 2024-04-09 RX ADMIN — ENOXAPARIN SODIUM 100 MG: 100 INJECTION SUBCUTANEOUS at 03:46

## 2024-04-09 RX ADMIN — EZETIMIBE 10 MG: 10 TABLET ORAL at 08:06

## 2024-04-09 RX ADMIN — OXYCODONE HYDROCHLORIDE 10 MG: 5 TABLET ORAL at 11:18

## 2024-04-09 RX ADMIN — METHOCARBAMOL TABLETS 750 MG: 500 TABLET, COATED ORAL at 03:39

## 2024-04-09 NOTE — ASSESSMENT & PLAN NOTE
Reports resting heart rate is usually in the 50s. Rates in PACU fluctuating but overall elevated though he was asymptomatic. He received IV lopressor during surgery  Continue toprol 25 BID  Coumadin held pre op, resume now

## 2024-04-09 NOTE — OCCUPATIONAL THERAPY NOTE
Occupational Therapy Progress Note     Patient Name: Tian Garcia  Today's Date: 4/9/2024  Problem List  Principal Problem:    Status post total knee replacement using cement, right  Active Problems:    WILL (obstructive sleep apnea)    Factor V Leiden mutation (HCC)    Paroxysmal A-fib (HCC)    Stage 3b chronic kidney disease (CKD) (HCC)    Atherosclerosis of native coronary artery of native heart without angina pectoris    IGT (impaired glucose tolerance)            04/09/24 0822   OT Last Visit   OT Visit Date 04/09/24   Note Type   Note Type Treatment   Pain Assessment   Pain Assessment Tool 0-10   Pain Score No Pain   Restrictions/Precautions   Weight Bearing Precautions Per Order Yes   RLE Weight Bearing Per Order WBAT   Other Precautions Cognitive;Chair Alarm;Bed Alarm;Multiple lines;Fall Risk;Pain   Lifestyle   Autonomy PTA pt living with wifein 2SH with FFSU possible, pt (I) with ADLs and IADLs, (-)falls, (+)drives use of rollator at baseline   Reciprocal Relationships supportive wife, son is local   Service to Others retired   Intrinsic Gratification enjoys hunting and fishing   ADL   Eating Assistance 7  Independent   Eating Comments finishing breakfast upon arrival   LB Dressing Assistance 7  Independent   LB Dressing Deficit Increased time to complete;Thread RLE into underwear;Thread LLE into underwear;Thread LLE into pants;Thread RLE into pants;Pull up over hips   LB Dressing Comments donning shorts and underwear   Toileting Assistance  7  Independent   Toileting Deficit Clothing management down;Perineal hygiene;Clothing management up;Grab bar use   Bed Mobility   Supine to Sit 6  Modified independent   Additional items Increased time required  (from flat bed)   Transfers   Sit to Stand 6  Modified independent   Stand to Sit 6  Modified independent   Toilet transfer 6  Modified independent   Additional items   (grab bar)   Additional Comments use of RW   Functional Mobility   Functional Mobility 6   "Modified independent   Additional Comments functional household distance   Additional items Rolling walker   Shower Transfers   Shower Transfers Comments Edu on technique for shower transfers and to make sure family is present during first shower   Subjective   Subjective \"I like you, when I come back for my next knee you need to make sure you're around\"   Cognition   Overall Cognitive Status WFL   Arousal/Participation Alert;Cooperative   Attention Within functional limits   Orientation Level Oriented X4   Memory Within functional limits   Following Commands Follows all commands and directions without difficulty   Comments pleasant and cooperative   Activity Tolerance   Activity Tolerance Patient tolerated treatment well   Medical Staff Made Aware MIREILLE Garcia, spoke to PT Eloina and NILTON Weeks   Assessment   Assessment Pt seen on this date for skilled OT treatment session. At start of session pt supine in bed. Pt agreeable and motivated to participate in session. Pt required increased time for bed mobility, however demonstrating improved performance with transfers and functional mobility. Pt with improved standing tolerance and toilet transfers. Improved LB dressing performance able to don underwear and pants, no VC needed. Edu on safe showering transfers. Pt has achieved all OT goals and reached current highest level of function at this time for ADL tasks. Pt with no further acute OT needs at this time, will d/c from OT services. Please re-consult if functional status declines.   Plan   Goal Expiration Date 04/18/24   OT Treatment Day 1   OT Frequency 3-5x/wk   Discharge Recommendation   Rehab Resource Intensity Level, OT No post-acute rehabilitation needs  (no OT needs)   AM-PAC Daily Activity Inpatient   Lower Body Dressing 4   Bathing 4   Toileting 4   Upper Body Dressing 4   Grooming 4   Eating 4   Daily Activity Raw Score 24   Daily Activity Standardized Score (Calc for Raw Score >=11) 57.54   AM-PAC Applied " Cognition Inpatient   Following a Speech/Presentation 4   Understanding Ordinary Conversation 4   Taking Medications 4   Remembering Where Things Are Placed or Put Away 4   Remembering List of 4-5 Errands 4   Taking Care of Complicated Tasks 4   Applied Cognition Raw Score 24   Applied Cognition Standardized Score 62.21   End of Consult   Patient Position at End of Consult Bedside chair;Bed/Chair alarm activated;All needs within reach           Divine Cain MS, OTR/L

## 2024-04-09 NOTE — PROGRESS NOTES
"Progress Note - Orthopedics   Tian Garcia 74 y.o. male MRN: [id number]  Unit/Bed#: S -01      Subjective:    74 y.o.male seen and examined at bedside. Overall doing well. No complaints. Pain is controlled. Voiding without issue. Eating/drinking well. No new or different complaints. Notes that his dressings fell off while working with PT/OT this morning and were \"around his ankle\". These have been removed.     Labs:  0   Lab Value Date/Time    HCT 32.3 (L) 04/09/2024 0536    HCT 37.9 03/13/2024 1054    HCT 36.7 (L) 01/03/2024 1028    HCT 36.1 (L) 01/03/2024 1027    HCT 29 05/22/2023 0000    HCT 28 05/04/2023 0000    HCT 27.9 (L) 04/19/2023 0538    HCT 30.3 (L) 04/18/2023 1151    HCT 39.5 01/23/2015 0440    HCT 40.7 01/22/2015 0610    HCT 43.4 01/21/2015 2022    HGB 10.1 (L) 04/09/2024 0536    HGB 12.6 (L) 03/13/2024 1054    HGB 12.7 (L) 01/03/2024 1028    HGB 12.3 (L) 01/03/2024 1027    HGB 9.5 05/22/2023 0000    HGB 9.3 05/04/2023 0000    HGB 9.0 (L) 04/19/2023 0538    HGB 9.7 (L) 04/18/2023 1151    HGB 12.9 01/23/2015 0440    HGB 13.4 01/22/2015 0610    HGB 15.0 01/21/2015 2042    HGB 14.4 01/21/2015 2022    INR 1.15 04/09/2024 0536    INR 2.2 (H) 01/11/2018 1405    WBC 13.31 (H) 04/09/2024 0536    WBC 5.2 03/13/2024 1054    WBC 6.0 01/03/2024 1028    WBC 6.3 01/03/2024 1027    WBC 5.1 05/22/2023 0000    WBC 5.0 05/04/2023 0000    WBC 7.40 04/19/2023 0538    WBC 6.77 04/18/2023 1151    WBC 8.43 01/23/2015 0440    WBC 12.60 (H) 01/22/2015 0610    WBC 14.04 (H) 01/21/2015 2022     (H) 05/10/2022 1949    CRP 8.2 (H) 05/10/2022 1950       Meds:    Current Facility-Administered Medications:     acetaminophen (TYLENOL) tablet 650 mg, 650 mg, Oral, Q6H PRN, Deepak Crane PA-C, 650 mg at 04/08/24 1743    atorvastatin (LIPITOR) tablet 80 mg, 80 mg, Oral, QPM, Deepak Crane PA-C, 80 mg at 04/08/24 1744    calcium carbonate (TUMS) chewable tablet 1,000 mg, 1,000 mg, Oral, Daily PRN, Deepak" Jerry Crane PA-C    docusate sodium (COLACE) capsule 100 mg, 100 mg, Oral, BID, Deepak Crane PA-C, 100 mg at 04/09/24 0806    DULoxetine (CYMBALTA) delayed release capsule 60 mg, 60 mg, Oral, Daily, Deepak Crane PA-C, 60 mg at 04/09/24 0806    enoxaparin (LOVENOX) subcutaneous injection 100 mg, 100 mg, Subcutaneous, Daily, Deepak Crane PA-C, 100 mg at 04/09/24 0346    ezetimibe (ZETIA) tablet 10 mg, 10 mg, Oral, Daily, Deepak Crane PA-C, 10 mg at 04/09/24 0806    folic acid (FOLVITE) tablet 1,000 mcg, 1,000 mcg, Oral, Daily, Deepak Crane PA-C, 1,000 mcg at 04/09/24 0806    gabapentin (NEURONTIN) capsule 300 mg, 300 mg, Oral, HS, Deepak Crane PA-C, 300 mg at 04/08/24 2133    HYDROmorphone (DILAUDID) injection 0.5 mg, 0.5 mg, Intravenous, Q2H PRN, Deepak Crane PA-C    lactated ringers bolus 1,000 mL, 1,000 mL, Intravenous, Once PRN **AND** lactated ringers bolus 1,000 mL, 1,000 mL, Intravenous, Once PRN, Deepak Crane PA-C    lactated ringers infusion, 75 mL/hr, Intravenous, Continuous, Deepak Crane PA-C, Last Rate: 75 mL/hr at 04/08/24 1400, 75 mL/hr at 04/08/24 1400    methocarbamol (ROBAXIN) tablet 750 mg, 750 mg, Oral, Q6H VARGAS, Deepak Crane PA-C, 750 mg at 04/09/24 0913    metoclopramide (REGLAN) injection 5 mg, 5 mg, Intravenous, Q6H PRN, Joselyn Reyes Bahamonde, MD    metoprolol succinate (TOPROL-XL) 24 hr tablet 25 mg, 25 mg, Oral, BID, Deepak Crane PA-C, 25 mg at 04/09/24 0806    oxyCODONE (ROXICODONE) immediate release tablet 10 mg, 10 mg, Oral, Q4H PRN, Deepak Crane PA-C, 10 mg at 04/08/24 1744    oxyCODONE (ROXICODONE) IR tablet 5 mg, 5 mg, Oral, Q4H PRN, Deepak Crane PA-C, 5 mg at 04/08/24 2231    senna (SENOKOT) tablet 8.6 mg, 1 tablet, Oral, Daily, Deepak Crane PA-C, 8.6 mg at 04/09/24 0806    sodium chloride 0.9 % bolus 1,000 mL, 1,000 mL,  Intravenous, Once PRN **AND** sodium chloride 0.9 % bolus 1,000 mL, 1,000 mL, Intravenous, Once PRN, Deepak Crane PA-C    tamsulosin (FLOMAX) capsule 0.4 mg, 0.4 mg, Oral, Daily With Dinner, Deepak Crane PA-C, 0.4 mg at 04/08/24 1757    warfarin (COUMADIN) tablet 5 mg, 5 mg, Oral, Daily (warfarin), Malika Portillo PA-C, 5 mg at 04/08/24 1745    Blood Culture:   Lab Results   Component Value Date    BLOODCX No Growth After 5 Days. 05/10/2022       Wound Culture:   Lab Results   Component Value Date    WOUNDCULT (A) 03/31/2022     2+ Growth of Methicillin Resistant Staphylococcus aureus       Ins and Outs:  I/O last 24 hours:  In: 2130 [P.O.:480; I.V.:1600; IV Piggyback:50]  Out: 1550 [Urine:1550]          Physical:  Vitals:    04/09/24 0808   BP: 111/72   Pulse: 93   Resp:    Temp:    SpO2: 97%     Musculoskeletal: right Lower Extremity  Surgical dressings have come entirely off at bedside.   Staple line is intact. No evidence of wound dehiscence. No drainage appreciated.   Wound re-dressed.   Sensation intact to saphenous, sural, tibial, superficial peroneal nerve, and deep peroneal  Motor intact to +FHL/EHL, +ankle dorsi/plantar flexion  2+ DP pulse  Digits warm and well perfused  Capillary refill < 2 seconds    Assessment:    74 y.o.male s/p right total knee arthroplasty with Dr. Galindo 4/8/2024 (POD 1) .     Plan:  WBAT to the right lower extremity, range of motion as tolerated.   Will monitor for ABLA and administer IVF/prbc as indicated for Greater than 2 gram drop or Hgb < 7   PT/OT - cleared for d/c home.   Pain control  DVT ppx: coumadin along with lovenox bridge.   Patient to follow up with primary provider upon discharge for repeat INR.   Appreciate medicine assistance with co-management. Clear for discharge per medicine team.   Dispo: Discharge home today  Will need follow up upon discharge with Dr. Galindo.     Desi Gonzales PA-C

## 2024-04-09 NOTE — PLAN OF CARE
Problem: PHYSICAL THERAPY ADULT  Goal: Performs mobility at highest level of function for planned discharge setting.  See evaluation for individualized goals.  Description: Treatment/Interventions: Functional transfer training, LE strengthening/ROM, Elevations, Therapeutic exercise, Endurance training, Gait training, Bed mobility, Equipment eval/education, Patient/family training          See flowsheet documentation for full assessment, interventions and recommendations.  Outcome: Progressing  Note:    Problem List: Decreased strength, Decreased range of motion, Decreased endurance, Impaired balance, Decreased mobility, Decreased safety awareness, Pain, Orthopedic restrictions  Assessment: Patient agreeable to PT session this a.m. patient is doing very well with transfers, ambulation with a roller walker and gait on stairs with use of bilateral rails.  Patient with good tolerance to right lower extremity exercise.  Patient will continue to benefit from skilled physical therapy services to further increase his active range of motion and strength to the right knee/lower extremity, and to improve his endurance, balance, functional mobility and gait with a roller walker.  Patient is safe for discharge home today with follow-up outpatient PT as planned.  Patient remains appropriate for Level III Minimum Resource Intensity when medically stable for discharge.        Rehab Resource Intensity Level, PT: III (Minimum Resource Intensity)    See flowsheet documentation for full assessment.

## 2024-04-09 NOTE — PROGRESS NOTES
[facility]  Progress Note  Name: Tian Garcia I  MRN: [id number]  Unit/Bed#: S -01 I Date of Admission: 4/8/2024   Date of Service: 4/9/2024 I Hospital Day: 0    Assessment/Plan   * Status post total knee replacement using cement, right  Assessment & Plan  POD #1 --mgmt per ortho  Pain control  PT    Paroxysmal A-fib ([facility])  Assessment & Plan  Reports resting heart rate is usually in the 50s. Rates in PACU fluctuating but overall elevated though he was asymptomatic. He received IV lopressor during surgery  Continue toprol 25 BID  Coumadin held pre op, resume now    Factor V Leiden mutation ([facility])  Assessment & Plan  With history of DVT  On coumadin longstanding at a dose of 2.5 mg daily, held pre-op. Gave 5 mg last night  Lovenox bridge as directed by PCP and trend INR    Stage 3b chronic kidney disease (CKD) ([facility])  Assessment & Plan  Lab Results   Component Value Date    EGFR 35 04/09/2024    EGFR 34 (L) 03/13/2024    EGFR 31 (L) 02/26/2024    CREATININE 1.85 (H) 04/09/2024    CREATININE 2.03 (H) 03/13/2024    CREATININE 2.17 (H) 02/26/2024   Stable creat--lower than baseline  resume demadex   No NSAIDS  Avoid NSAIDS and hypotension    IGT (impaired glucose tolerance)  Assessment & Plan  Routine A1c q 90 days  resume mounjaro    Atherosclerosis of native coronary artery of native heart without angina pectoris  Assessment & Plan  April 2023, NSTEMI s/p Successful PCI w/ non-overlapping ROBERT x2 of sequential prox & mid LAD culprit lesions. Residual mid RCA 80% diffuse lesion  Statin, BB, aspirin    WILL (obstructive sleep apnea)  Assessment & Plan  CPAP         VTE Pharmacologic Prophylaxis:   lovneox/coumadin    Mobility:   Basic Mobility Inpatient Raw Score: 19  JH-HLM Goal: 6: Walk 10 steps or more  JH-HLM Achieved: 7: Walk 25 feet or more  JH-HLM Goal achieved. Continue to encourage appropriate mobility.    Patient Centered Rounds:    Discussions with Specialists or Other Care Team  Provider: ortho    Education and Discussions with Family / Patient:     Total Time Spent on Date of Encounter in care of patient: 20 mins. This time was spent on one or more of the following: performing physical exam; counseling and coordination of care; obtaining or reviewing history; documenting in the medical record; reviewing/ordering tests, medications or procedures; communicating with other healthcare professionals and discussing with patient's family/caregivers.    Current Length of Stay: 0 day(s)  Current Patient Status: Outpatient Surgery   Certification Statement:  d/c today per ortho  Discharge Plan:  home today    Code Status: Level 1 - Full Code    Subjective:   Patient is doing well at this time with no complaints or concerns reported    Objective:     Vitals:   Temp (24hrs), Av.7 °F (36.5 °C), Min:97.4 °F (36.3 °C), Max:98 °F (36.7 °C)    Temp:  [97.4 °F (36.3 °C)-98 °F (36.7 °C)] 98 °F (36.7 °C)  HR:  [] 93  Resp:  [10-18] 18  BP: ()/(59-90) 111/72  SpO2:  [93 %-100 %] 97 %  Body mass index is 32.5 kg/m².     Input and Output Summary (last 24 hours):     Intake/Output Summary (Last 24 hours) at 2024 0956  Last data filed at 2024 0845  Gross per 24 hour   Intake 2130 ml   Output 1550 ml   Net 580 ml       Physical Exam:   Physical Exam  Vitals reviewed.   Constitutional:       General: He is not in acute distress.     Appearance: He is obese. He is not ill-appearing, toxic-appearing or diaphoretic.   Eyes:      General: No scleral icterus.        Right eye: No discharge.         Left eye: No discharge.      Conjunctiva/sclera: Conjunctivae normal.   Cardiovascular:      Rate and Rhythm: Normal rate.      Heart sounds: No murmur heard.  Pulmonary:      Effort: No respiratory distress.      Breath sounds: No stridor. No wheezing, rhonchi or rales.   Abdominal:      General: There is no distension.      Tenderness: There is no abdominal tenderness. There is no guarding.    Musculoskeletal:      Comments: Ace wrap in place RLE   Skin:     Coloration: Skin is not jaundiced or pale.      Findings: No bruising, erythema, lesion or rash.   Neurological:      General: No focal deficit present.      Mental Status: He is alert. Mental status is at baseline.   Psychiatric:         Mood and Affect: Mood normal.         Thought Content: Thought content normal.          Additional Data:     Labs:  Results from last 7 days   Lab Units 04/09/24  0536   WBC Thousand/uL 13.31*   HEMOGLOBIN g/dL 10.1*   HEMATOCRIT % 32.3*   PLATELETS Thousands/uL 139*     Results from last 7 days   Lab Units 04/09/24  0536   SODIUM mmol/L 137   POTASSIUM mmol/L 3.9   CHLORIDE mmol/L 104   CO2 mmol/L 27   BUN mg/dL 32*   CREATININE mg/dL 1.85*   ANION GAP mmol/L 6   CALCIUM mg/dL 8.8   GLUCOSE RANDOM mg/dL 179*     Results from last 7 days   Lab Units 04/09/24  0536   INR  1.15     Results from last 7 days   Lab Units 04/08/24  0827   POC GLUCOSE mg/dl 112               Lines/Drains:  Invasive Devices       Peripheral Intravenous Line  Duration             Peripheral IV 04/08/24 Right Wrist 1 day                    Imaging:     Recent Cultures (last 7 days):         Last 24 Hours Medication List:   Current Facility-Administered Medications   Medication Dose Route Frequency Provider Last Rate    acetaminophen  650 mg Oral Q6H PRN Deepak Crane PA-C      atorvastatin  80 mg Oral QPM Deepak Crane PA-C      calcium carbonate  1,000 mg Oral Daily PRN Deepak Crane PA-C      docusate sodium  100 mg Oral BID Deepak Crane PA-C      DULoxetine  60 mg Oral Daily Deepak Crane PA-C      enoxaparin  100 mg Subcutaneous Daily Deepak Crane PA-C      ezetimibe  10 mg Oral Daily Deepak Crane PA-C      folic acid  1,000 mcg Oral Daily Deepak Crane PA-C      gabapentin  300 mg Oral HS Deepak Crane PA-C      HYDROmorphone  0.5 mg Intravenous  Q2H PRN Deepak Crane PA-C      lactated ringers  1,000 mL Intravenous Once PRN Deepak Crane PA-C      And    lactated ringers  1,000 mL Intravenous Once PRN Deepak Crane PA-C      lactated ringers  75 mL/hr Intravenous Continuous Deepak Crane PA-C 75 mL/hr (04/08/24 1400)    methocarbamol  750 mg Oral Q6H VARGAS Deepak Crane PA-C      metoclopramide  5 mg Intravenous Q6H PRN Joselyn Reyes Bahamonde, MD      metoprolol succinate  25 mg Oral BID Deepak Crane PA-C      oxyCODONE  10 mg Oral Q4H PRN Deepak Crane PA-C      oxyCODONE  5 mg Oral Q4H PRN Deepak Crane PA-C      senna  1 tablet Oral Daily Deepak Crane PA-C      sodium chloride  1,000 mL Intravenous Once PRN Deepak Crane PA-C      And    sodium chloride  1,000 mL Intravenous Once PRN Deepak Crane PA-C      tamsulosin  0.4 mg Oral Daily With Dinner Deepak Crane PA-C      warfarin  5 mg Oral Daily (warfarin) Malika Portillo PA-C          Today, Patient Was Seen By: Malika Portillo PA-C    **Please Note: This note may have been constructed using a voice recognition system.**

## 2024-04-09 NOTE — ASSESSMENT & PLAN NOTE
Lab Results   Component Value Date    EGFR 35 04/09/2024    EGFR 34 (L) 03/13/2024    EGFR 31 (L) 02/26/2024    CREATININE 1.85 (H) 04/09/2024    CREATININE 2.03 (H) 03/13/2024    CREATININE 2.17 (H) 02/26/2024   Stable creat--lower than baseline  resume demadex   No NSAIDS  Avoid NSAIDS and hypotension

## 2024-04-09 NOTE — ASSESSMENT & PLAN NOTE
With history of DVT  On coumadin longstanding at a dose of 2.5 mg daily, held pre-op. Gave 5 mg last night  Lovenox bridge as directed by PCP and trend INR

## 2024-04-09 NOTE — CONSULTS
Consultation - Nephrology   Tian KARLOS Garcia 74 y.o. male MRN: [id number]  Unit/Bed#: S -01 Encounter: [id number]    ASSESSMENT AND PLAN:  74 y.o.  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  Patient admitted for right knee replacement.  Nephrology is consulted for management of CKD and JANEL prevention    PLAN    #CKD G3bA2  Baseline creatinine: 1.8 to 2 mg/dL  Current creatinine: At baseline  Etiology: Secondary to infection associated GN versus background of IgA nephropathy  UPCR 166 mg/g  Plan:  Kidney function at baseline  Maintain mean arterial pressure above 65 mmHg to avoid hypotension/hypoperfusion  Avoid nephrotoxic agents such as NSAIDs and contrast if at all possible    Avoid opioids   Adjust medications to GFR  Renal diet   Patient can continue follow-up with Dr. Covarrubias on discharge  Discontinue IV fluids since patient is allowed to eat    #Volume/hypertension:  Volume: Euvolemic  Blood pressure: Normotensive, /72, goal less than 140/90  Low sodium diet   Discontinue IV fluids as above  Hold torsemide for now can be restarted on discharge  On metoprolol twice daily    # JANEL prevention  Status post IV fluids, can discontinue now that patient can eat  Avoid NSAIDs  Kidney function is stable at baseline    #Acid base disorder  serum HCO3 27 mmol/L  At goal    #CKD-MBD  Calcium 8.8 mg/dL  At goal    #Secondary Hyperparathyroidism   iPTH as an outpatient, guidelines levels KDIGO 2017      #Anemia:  Current hemoglobin: 10.1 mg/dL  Treatment:  No indication of Epogen at this time  Transfuse for hemoglobin less than 7.0 per primary service      #OA  status post right knee replacementA  Management as per  orthopedics      The highlighted and/or bolded points in my assessment, plan, and disposition were discussed with the primary team and they agree with those points and the plan.  Previous records were personally reviewed by me to obtain a baseline creatinine.   The images (CXR) were personally reviewed by me in PACS      HISTORY OF PRESENT ILLNESS:  Requesting Physician: Dot Galindo MD  Reason for Consult: Management of CKD, JANEL prevention    Tian Garcia is a 74 y.o.  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  Patient admitted for right knee replacement. A renal consultation is requested today for assistance in the management of CKD and JANEL..    PAST MEDICAL HISTORY:  Past Medical History:   Diagnosis Date    Acute venous embolism and thrombosis of deep vessels of proximal lower extremity (HCC)     Last assessed: 5/18/15    JANEL (acute kidney injury) (HCC)     Arthritis     Cellulitis     LE    Chronic kidney disease 03/2020    Acute Kidney Injury    CPAP (continuous positive airway pressure) dependence     Factor V Leiden (HCC)     Forgetfulness     Gross hematuria 05/17/2022    Headache(784.0) 03/2022    Never till cervical  spine surgery    Heart murmur 03/2020    Told when in hospital    History of transfusion     [place] (hard of hearing)     Hypoalbuminemia 05/11/2022    Other acute osteomyelitis, other site (HCC) 01/03/2023    Paroxysmal atrial fibrillation (HCC)     Last assessed: 11/2/15    Sleep apnea        PAST SURGICAL HISTORY:  Past Surgical History:   Procedure Laterality Date    BACK SURGERY      Lower    CARDIAC CATHETERIZATION N/A 04/09/2023    Procedure: Cardiac pci;  Surgeon: Bird Cast MD;   [facility];  Service: Cardiology    CARDIAC CATHETERIZATION N/A 04/09/2023    Procedure: Cardiac Coronary Angiogram;  Surgeon: Bird Cast MD;  [facility];  Service: Cardiology    CATARACT EXTRACTION      COLON SURGERY      COLONOSCOPY      INCISION AND DRAINAGE POSTERIOR SPINE N/A 04/01/2022    Procedure: Posterior cervical evacuation of postoperative collection and debridement with placement of drains C3-T1;  Surgeon: Rajeev Garcia MD;  Location: [facility];  Service: Neurosurgery    IR BIOPSY KIDNEY RANDOM  05/26/2022    IR TUNNELED DIALYSIS CATHETER PLACEMENT  05/23/2022    IR TUNNELED DIALYSIS CATHETER REMOVAL  06/02/2022    UT ARTHRD ANT INTERBODY MIN DSC CRV BELOW C2 N/A 03/11/2022    Procedure: Anterior cervical discectomy and fixation fusion C5/6 and C6/7; Posterior cervical decompression and instrumented fusion C3-T1;  Surgeon: Rajeev Garcia MD;  Location[facility];  Service: Neurosurgery    UT ARTHRP KNE CONDYLE&PLATU MEDIAL&LAT COMPARTMENTS Right 4/8/2024    Procedure: ARTHROPLASTY KNEE TOTAL and all associated procedures;  Surgeon: Dot Galindo MD;  Location[facility];  Service: Orthopedics    RENAL BIOPSY  6/2022    SPINE SURGERY  03/11/2022    Cervical myelopathy/ cervical fusion       ALLERGIES:  Allergies   Allergen Reactions    Morphine GI Intolerance    Vitamin K Other (See Comments)     On coumadin-patient sensitive to vitamin K amounts in meds etc.       SOCIAL HISTORY:  Social History     Substance and Sexual Activity   Alcohol Use Not Currently     Social History     Substance and Sexual Activity   Drug Use No     Social History     Tobacco Use   Smoking Status Never   Smokeless Tobacco Never       FAMILY HISTORY:  Family History   Problem Relation Age of Onset    Lung cancer Mother     Hearing loss Father     Emphysema Sister     Heart attack Brother     Atrial fibrillation Brother     Clotting disorder Son         MEDICATIONS:    Current Facility-Administered Medications:     acetaminophen (TYLENOL) tablet 650 mg, 650 mg, Oral, Q6H PRN, Deepak Crane PA-C, 650 mg at 04/08/24 1743    atorvastatin (LIPITOR) tablet 80 mg, 80 mg, Oral, QPM, Deepak Crane PA-C, 80 mg at 04/08/24 1744    calcium carbonate (TUMS) chewable tablet 1,000 mg, 1,000 mg, Oral, Daily PRN, Deepak Crane PA-C    docusate sodium (COLACE) capsule 100 mg, 100 mg, Oral, BID, Deepak Crane PA-C, 100 mg at 04/09/24 0806    DULoxetine (CYMBALTA) delayed release capsule 60 mg, 60 mg, Oral, Daily, Deepak Crane PA-C, 60 mg at 04/09/24 0806    enoxaparin (LOVENOX) subcutaneous injection 100 mg, 100 mg, Subcutaneous, Daily, Deepak Crane PA-C, 100 mg at 04/09/24 0346    ezetimibe (ZETIA) tablet 10 mg, 10 mg, Oral, Daily, Deepak Crane PA-C, 10 mg at 04/09/24 0806    folic acid (FOLVITE) tablet 1,000 mcg, 1,000 mcg, Oral, Daily, Deepak Crane PA-C, 1,000 mcg at 04/09/24 0806    gabapentin (NEURONTIN) capsule 300 mg, 300 mg, Oral, HS, Deepak Crane PA-C, 300 mg at 04/08/24 2133    HYDROmorphone (DILAUDID) injection 0.5 mg, 0.5 mg, Intravenous, Q2H PRN, Deepak Crane PA-C    lactated ringers bolus 1,000 mL, 1,000 mL, Intravenous, Once PRN **AND** lactated ringers bolus 1,000 mL, 1,000 mL, Intravenous, Once PRN, Deepak Crane PA-C    lactated ringers infusion, 75 mL/hr, Intravenous, Continuous, Deepak Crane PA-C, Last Rate: 75 mL/hr at 04/08/24 1400, 75 mL/hr at 04/08/24 1400    methocarbamol (ROBAXIN) tablet 750 mg, 750 mg, Oral, Q6H VARGAS, Deepak Crane PA-C, 750 mg at 04/09/24 0339    metoclopramide (REGLAN) injection 5 mg, 5 mg, Intravenous, Q6H PRN, Joselyn Reyes Bahamonde, MD    metoprolol succinate (TOPROL-XL) 24 hr tablet 25 mg, 25 mg, Oral, BID, Deepak Crane PA-C, 25 mg at 04/09/24 0806    oxyCODONE (ROXICODONE)  immediate release tablet 10 mg, 10 mg, Oral, Q4H PRN, Deepak Crane PA-C, 10 mg at 04/08/24 1744    oxyCODONE (ROXICODONE) IR tablet 5 mg, 5 mg, Oral, Q4H PRN, Deepak Crane PA-C, 5 mg at 04/08/24 2231    senna (SENOKOT) tablet 8.6 mg, 1 tablet, Oral, Daily, Deepak Crane PA-C, 8.6 mg at 04/09/24 0806    sodium chloride 0.9 % bolus 1,000 mL, 1,000 mL, Intravenous, Once PRN **AND** sodium chloride 0.9 % bolus 1,000 mL, 1,000 mL, Intravenous, Once PRN, Deepak Crane PA-C    tamsulosin (FLOMAX) capsule 0.4 mg, 0.4 mg, Oral, Daily With Dinner, Deepak Crane PA-C, 0.4 mg at 04/08/24 1757    warfarin (COUMADIN) tablet 5 mg, 5 mg, Oral, Daily (warfarin), Malika Portillo PA-C, 5 mg at 04/08/24 1745    REVIEW OF SYSTEMS:  Complete 10 point review of systems were obtained and discussed in length with the patient. Complete review of systems were negative / unremarkable except mentioned in the HPI section.     Review of Systems - Psychological ROS: negative  Ophthalmic ROS: negative  ENT ROS: negative  Hematological and Lymphatic ROS: negative  Endocrine ROS: negative  Respiratory ROS: no cough, shortness of breath, or wheezing  negative  Cardiovascular ROS: no chest pain or dyspnea on exertion  Gastrointestinal ROS: no abdominal pain, change in bowel habits, or black or bloody stools  Genito-Urinary ROS: no dysuria, trouble voiding, or hematuria  Musculoskeletal ROS: positive for - joint pain  Neurological ROS: no TIA or stroke symptoms  Dermatological ROS: negative     PHYSICAL EXAM:  Current Weight: Weight - Scale: 106 kg (233 lb)  First Weight: Weight - Scale: 106 kg (233 lb)  Vitals:    04/09/24 0808   BP: 111/72   Pulse: 93   Resp:    Temp:    SpO2: 97%       Intake/Output Summary (Last 24 hours) at 4/9/2024 0840  Last data filed at 4/9/2024 0401  Gross per 24 hour   Intake 1650 ml   Output 1550 ml   Net 100 ml     Wt Readings from Last 3 Encounters:   04/08/24 106 kg (233  "lb)   03/26/24 111 kg (245 lb)   03/19/24 111 kg (245 lb)     Temp Readings from Last 3 Encounters:   04/09/24 98 °F (36.7 °C)   03/26/24 98 °F (36.7 °C) (Temporal)   04/19/23 97.6 °F (36.4 °C) (Oral)     BP Readings from Last 3 Encounters:   04/09/24 111/72   03/26/24 118/78   03/19/24 112/68     Pulse Readings from Last 3 Encounters:   04/09/24 93   03/26/24 83   03/19/24 (!) 51        General:  no acute distress at this time  Skin:  No acute rash  Eyes:  No scleral icterus and noninjected  ENT:  mucous membranes moist  Neck:  no carotid bruits  Chest:  Clear to auscultation percussion, good respiratory effort, no use of accessory respiratory muscles  CVS:  Regular rate and rhythm without rub   Abdomen:  soft and nontender   Extremities: Right knee dressing   Neuro:  No gross focality  Psych:  Alert , cooperative       Invasive Devices:      Lab Results:   Results from last 7 days   Lab Units 04/09/24  0536   WBC Thousand/uL 13.31*   HEMOGLOBIN g/dL 10.1*   HEMATOCRIT % 32.3*   PLATELETS Thousands/uL 139*   POTASSIUM mmol/L 3.9   CHLORIDE mmol/L 104   CO2 mmol/L 27   BUN mg/dL 32*   CREATININE mg/dL 1.85*   CALCIUM mg/dL 8.8       Other Studies:   No orders to display       Portions of the record may have been created with voice recognition software. Occasional wrong word or \"sound a like\" substitutions may have occurred due to the inherent limitations of voice recognition software. Read the chart carefully and recognize, using context, where substitutions have occurred.   "

## 2024-04-09 NOTE — PHYSICAL THERAPY NOTE
"   PT TREATMENT     04/09/24 0906   PT Last Visit   PT Visit Date 04/09/24   Note Type   Note Type BID visit/treatment   Pain Assessment   Pain Assessment Tool 0-10   Pain Score 3   Pain Location/Orientation Orientation: Right;Location: Knee   Pain Onset/Description Onset: Ongoing   Effect of Pain on Daily Activities Limits comfort mildly   Patient's Stated Pain Goal No pain   Hospital Pain Intervention(s) Repositioned;Ambulation/increased activity;Emotional support;Rest   Restrictions/Precautions   Weight Bearing Precautions Per Order Yes   RLE Weight Bearing Per Order WBAT   Other Precautions WBS;Fall Risk;Bed Alarm;Chair Alarm;Pain  (IV; Darnell on room air)   General   Chart Reviewed Yes   Family/Caregiver Present No   Cognition   Overall Cognitive Status WFL   Arousal/Participation Cooperative   Attention Within functional limits   Orientation Level Oriented X4   Memory Within functional limits   Following Commands Follows all commands and directions without difficulty   Comments At least 2 patient identifiers including name and date of birth   Subjective   Subjective \"I cannot believe how I do not really have any pain anymore\"   Bed Mobility   Additional Comments Patient received out of bed in chair   Transfers   Sit to Stand 6  Modified independent   Additional items Armrests   Stand to Sit 6  Modified independent   Additional items Armrests   Ambulation/Elevation   Gait pattern Narrow AZRA;Decreased foot clearance;Decreased R stance;Step through pattern   Gait Assistance 6  Modified independent   Additional items Assist x 1;Verbal cues;Tactile cues   Assistive Device Rolling walker   Distance 50 feet x 2 reps, both with change in direction   Stair Management Assistance 6  Modified independent   Additional items Assist x 1;Verbal cues;Tactile cues   Stair Management Technique Step to pattern;Two rails   Number of Stairs 4   Balance   Static Sitting Good   Static Standing Fair +  (With roller walker) "   Ambulatory Fair  (With roller walker)   Activity Tolerance   Activity Tolerance Patient tolerated treatment well;Patient limited by fatigue;Patient limited by pain   Medical Staff Made Aware MIGUEL Haskins; Ortho FABIOLA Weeks   Nurse Made Aware MIREILLE Garcia   Exercises   Quad Sets 15 reps;Right  (In recliner chair)   Knee Flexion Stretch 15 reps;Right;Sitting   Knee AROM Long Arc Quad 15 reps;Right;Sitting   Assessment   Problem List Decreased strength;Decreased range of motion;Decreased endurance;Impaired balance;Decreased mobility;Decreased safety awareness;Pain;Orthopedic restrictions   Assessment Patient agreeable to PT session this a.m. patient is doing very well with transfers, ambulation with a roller walker and gait on stairs with use of bilateral rails.  Patient with good tolerance to right lower extremity exercise.  Patient will continue to benefit from skilled physical therapy services to further increase his active range of motion and strength to the right knee/lower extremity, and to improve his endurance, balance, functional mobility and gait with a roller walker.  Patient is safe for discharge home today with follow-up outpatient PT as planned.  Patient remains appropriate for Level III Minimum Resource Intensity when medically stable for discharge.    The patient's AM-PAC Basic Mobility Inpatient Short Form Raw Score is 19. A Raw score of greater than 16 suggests the patient may benefit from discharge to home. Please also refer to the recommendation of the Physical Therapist for safe discharge planning.   Goals   STG Expiration Date 04/18/24   Short Term Goal #1 pt will: Increase R LE strength 1/2 grade to facilitate independent mobility, Perform bed mobility independently to increase level of independence, Perform all transfers independently to improve independence, Ambulate 300 ft. with roller walker independently w/o LOB to improve functional independence, Navigate 4 stair(s) independently with  bilateral handrails to facilitate return to previous living environment, Increase ambulatory balance 1/2 grade to decrease risk for falls, Complete exercise program independently to increase strength and endurance, Tolerate 3 hr OOB to faciliate upright tolerance, Improve Barthel Index score to 75 or greater to facilitate independence, and Complete Timed Up and Go or Comfortable Gait Speed to further assess mobility and monitor progress   Plan   Treatment/Interventions ADL retraining;Functional transfer training;LE strengthening/ROM;Elevations;Therapeutic exercise;Endurance training;Patient/family training;Equipment eval/education;Bed mobility;Gait training;Compensatory technique education;Spoke to nursing;OT;Spoke to advanced practitioner   PT Frequency Twice a day   Discharge Recommendation   Rehab Resource Intensity Level, PT III (Minimum Resource Intensity)   Equipment Recommended   (Per patient, has 2 walkers at home)   AM-PAC Basic Mobility Inpatient   Turning in Flat Bed Without Bedrails 4   Lying on Back to Sitting on Edge of Flat Bed Without Bedrails 3   Moving Bed to Chair 3   Standing Up From Chair Using Arms 3   Walk in Room 3   Climb 3-5 Stairs With Railing 3   Basic Mobility Inpatient Raw Score 19   Basic Mobility Standardized Score 42.48   [facility] Highest Level Of Mobility   -HLM Goal 6: Walk 10 steps or more   -HLM Achieved 7: Walk 25 feet or more   Education   Education Provided Mobility training;Home exercise program;Assistive device   Patient Demonstrates acceptance/verbal understanding;Explanation/teachback used   End of Consult   Patient Position at End of Consult Bedside chair;Bed/Chair alarm activated;All needs within reach   Licensure   [place] License Number  Yara L YUMIKO García   Portions of the documentation may have been created using voice recognition software. Occasional wrong word or sound alike substitutions may have occurred due to the inherent limitation of the voice  recognition software. Read the chart carefully and recognize, using context, where substitutions have occurred.

## 2024-04-09 NOTE — PLAN OF CARE
Problem: PAIN - ADULT  Goal: Verbalizes/displays adequate comfort level or baseline comfort level  Description: Interventions:  - Encourage patient to monitor pain and request assistance  - Assess pain using appropriate pain scale  - Administer analgesics based on type and severity of pain and evaluate response  - Implement non-pharmacological measures as appropriate and evaluate response  - Consider cultural and social influences on pain and pain management  - Notify physician/advanced practitioner if interventions unsuccessful or patient reports new pain  Outcome: Progressing     Problem: INFECTION - ADULT  Goal: Absence or prevention of progression during hospitalization  Description: INTERVENTIONS:  - Assess and monitor for signs and symptoms of infection  - Monitor lab/diagnostic results  - Monitor all insertion sites, i.e. indwelling lines, tubes, and drains  - Monitor endotracheal if appropriate and nasal secretions for changes in amount and color  - [place] appropriate cooling/warming therapies per order  - Administer medications as ordered  - Instruct and encourage patient and family to use good hand hygiene technique  - Identify and instruct in appropriate isolation precautions for identified infection/condition  Outcome: Progressing  Goal: Absence of fever/infection during neutropenic period  Description: INTERVENTIONS:  - Monitor WBC    Outcome: Progressing     Problem: SAFETY ADULT  Goal: Patient will remain free of falls  Description: INTERVENTIONS:  - Educate patient/family on patient safety including physical limitations  - Instruct patient to call for assistance with activity   - Consult OT/PT to assist with strengthening/mobility   - Keep Call bell within reach  - Keep bed low and locked with side rails adjusted as appropriate  - Keep care items and personal belongings within reach  - Initiate and maintain comfort rounds  - Make Fall Risk Sign visible to staff  - Apply yellow socks and bracelet  for high fall risk patients  - Consider moving patient to room near nurses station  Outcome: Progressing  Goal: Maintain or return to baseline ADL function  Description: INTERVENTIONS:  -  Assess patient's ability to carry out ADLs; assess patient's baseline for ADL function and identify physical deficits which impact ability to perform ADLs (bathing, care of mouth/teeth, toileting, grooming, dressing, etc.)  - Assess/evaluate cause of self-care deficits   - Assess range of motion  - Assess patient's mobility; develop plan if impaired  - Assess patient's need for assistive devices and provide as appropriate  - Encourage maximum independence but intervene and supervise when necessary  - Involve family in performance of ADLs  - Assess for home care needs following discharge   - Consider OT consult to assist with ADL evaluation and planning for discharge  - Provide patient education as appropriate  Outcome: Progressing  Goal: Maintains/Returns to pre admission functional level  Description: INTERVENTIONS:  - Perform AM-PAC 6 Click Basic Mobility/ Daily Activity assessment daily.  - Set and communicate daily mobility goal to care team and patient/family/caregiver.   - Collaborate with rehabilitation services on mobility goals if consulted  - Out of bed for toileting  - Record patient progress and toleration of activity level   Outcome: Progressing     Problem: DISCHARGE PLANNING  Goal: Discharge to home or other facility with appropriate resources  Description: INTERVENTIONS:  - Identify barriers to discharge w/patient and caregiver  - Arrange for needed discharge resources and transportation as appropriate  - Identify discharge learning needs (meds, wound care, etc.)  - Arrange for interpretive services to assist at discharge as needed  - Refer to Case Management Department for coordinating discharge planning if the patient needs post-hospital services based on physician/advanced practitioner order or complex needs  related to functional status, cognitive ability, or social support system  Outcome: Progressing     Problem: Knowledge Deficit  Goal: Patient/family/caregiver demonstrates understanding of disease process, treatment plan, medications, and discharge instructions  Description: Complete learning assessment and assess knowledge base.  Interventions:  - Provide teaching at level of understanding  - Provide teaching via preferred learning methods  Outcome: Progressing

## 2024-04-09 NOTE — PLAN OF CARE
Problem: OCCUPATIONAL THERAPY ADULT  Goal: Performs self-care activities at highest level of function for planned discharge setting.  See evaluation for individualized goals.  Description: Treatment Interventions: ADL retraining, Functional transfer training, Endurance training, Patient/family training, Equipment evaluation/education, Compensatory technique education          See flowsheet documentation for full assessment, interventions and recommendations.   Outcome: Progressing  Note: Limitation: Decreased ADL status, Decreased endurance, Decreased self-care trans, Decreased high-level ADLs (impaired pain, balance, act jaylon, standing jaylon, strength, pacing)  Prognosis: Good  Assessment: Pt seen on this date for skilled OT treatment session. At start of session pt supine in bed. Pt agreeable and motivated to participate in session. Pt required increased time for bed mobility, however demonstrating improved performance with transfers and functional mobility. Pt with improved standing tolerance and toilet transfers. Improved LB dressing performance able to don underwear and pants, no VC needed. Edu on safe showering transfers. Pt has achieved all OT goals and reached current highest level of function at this time for ADL tasks. Pt with no further acute OT needs at this time, will d/c from OT services. Please re-consult if functional status declines.     Rehab Resource Intensity Level, OT: No post-acute rehabilitation needs (no OT needs)

## 2024-04-09 NOTE — CONSULTS
Please see progress note from today by this provider for formal details of encounter.    DEEPTHI Berg  Acute Pain Service     High cholesterol    Hypertension

## 2024-04-09 NOTE — PROGRESS NOTES
Peripheral Nerve Block Follow-up Note - Acute Pain Service    Tian Garcia 74 y.o. male MRN: [id number]  Unit/Bed#: S -01 Encounter: [id number]      Assessment:   Principal Problem:    Status post total knee replacement using cement, right  Active Problems:    Factor V Leiden mutation (HCC)    Paroxysmal A-fib (HCC)    Stage 3b chronic kidney disease (CKD) (HCC)    Atherosclerosis of native coronary artery of native heart without angina pectoris    IGT (impaired glucose tolerance)    Tian Garcia is a 74 y.o. year old male with history of right knee osteoarthritis.  He underwent elective right total knee arthroplasty with orthopedic surgery on 4/8/2024.  He received right-sided single shot adductor canal and ipack blocks with Exparel for postoperative analgesia.  Acute pain service was consulted for postoperative block follow-up.    Patient seen at bedside this morning, sitting up in chair.  He reports pain has been well-controlled postoperatively and he has used minimal as needed opioids.  He has been tolerating the current analgesic regimen and denies any opioid-induced side effects.  He is tentative for discharge home today.    Plan:   Right-sided single shot adductor canal and ipack blocks with Exparel have resolved as of this morning, he denies any residual sensory/motor deficit and pain continues to be well-controlled.    - Multimodal analgesia with:  Cymbalta 60 mg daily, home medication  Gabapentin 300 mg daily at bedtime  Methocarbamol 750 mg every 6 hours scheduled  Tylenol 650 mg every 6 hours as needed, for mild pain  Oxycodone 5 mg / 10 mg every 4 hours as needed for moderate/severe pain  IV Dilaudid 0.5 mg every 2 hours as needed for breakthrough pain  Recommend continuing bowel regimen while utilizing opioids to prevent opioid-induced constipation    APS will sign off at this time. Thank you for the consult. All opioids and other analgesics to be written at discretion of primary team. Please  contact Acute Pain Service - [facility] via [employer] from [phone] with additional questions or concerns. See [employer] or [facility] for additional contacts and after hours information.    Pain History  Current pain location(s): Right knee  Pain Scale:   3  Quality: Aching  24 hour history: No acute events overnight, remains hemodynamically stable.  Tolerating current MMA and denies any opioid-induced side effects.  Plan for discharge home today.    Opioid requirement previous 24 hours: 15 mg oxycodone    Meds/Allergies   all current active meds have been reviewed, current meds:   Current Facility-Administered Medications   Medication Dose Route Frequency    acetaminophen (TYLENOL) tablet 650 mg  650 mg Oral Q6H PRN    atorvastatin (LIPITOR) tablet 80 mg  80 mg Oral QPM    calcium carbonate (TUMS) chewable tablet 1,000 mg  1,000 mg Oral Daily PRN    docusate sodium (COLACE) capsule 100 mg  100 mg Oral BID    DULoxetine (CYMBALTA) delayed release capsule 60 mg  60 mg Oral Daily    enoxaparin (LOVENOX) subcutaneous injection 100 mg  100 mg Subcutaneous Daily    ezetimibe (ZETIA) tablet 10 mg  10 mg Oral Daily    folic acid (FOLVITE) tablet 1,000 mcg  1,000 mcg Oral Daily    gabapentin (NEURONTIN) capsule 300 mg  300 mg Oral HS    HYDROmorphone (DILAUDID) injection 0.5 mg  0.5 mg Intravenous Q2H PRN    lactated ringers bolus 1,000 mL  1,000 mL Intravenous Once PRN    And    lactated ringers bolus 1,000 mL  1,000 mL Intravenous Once PRN    lactated ringers infusion  75 mL/hr Intravenous Continuous    methocarbamol (ROBAXIN) tablet 750 mg  750 mg Oral Q6H VARGAS    metoclopramide (REGLAN) injection 5 mg  5 mg Intravenous Q6H PRN    metoprolol succinate (TOPROL-XL) 24 hr tablet 25 mg  25 mg Oral BID    oxyCODONE (ROXICODONE) immediate release tablet 10 mg  10 mg Oral Q4H PRN    oxyCODONE (ROXICODONE) IR tablet 5 mg  5 mg Oral Q4H PRN    senna (SENOKOT) tablet 8.6 mg  1 tablet Oral Daily    sodium chloride 0.9 % bolus 1,000 mL  1,000  mL Intravenous Once PRN    And    sodium chloride 0.9 % bolus 1,000 mL  1,000 mL Intravenous Once PRN    tamsulosin (FLOMAX) capsule 0.4 mg  0.4 mg Oral Daily With Dinner    warfarin (COUMADIN) tablet 5 mg  5 mg Oral Daily (warfarin)   , and PTA meds:   Prior to Admission Medications   Prescriptions Last Dose Informant Patient Reported? Taking?   Aspirin 81 MG CAPS Past Month  Yes Yes   Sig: Take by mouth   DULoxetine (CYMBALTA) 60 mg delayed release capsule 4/7/2024 Self No Yes   Sig: TAKE 1 CAPSULE BY MOUTH EVERY DAY   Diclofenac Sodium (VOLTAREN) 1 %  Self No No   Sig: Apply 2 g topically 4 (four) times a day   acetaminophen (TYLENOL) 500 mg tablet 4/7/2024 Self Yes Yes   Sig: Take 500 mg by mouth if needed for mild pain   atorvastatin (LIPITOR) 80 mg tablet 4/7/2024 Self No Yes   Sig: Take 1 tablet (80 mg total) by mouth every evening   enoxaparin (LOVENOX) 100 mg/mL 4/7/2024  No Yes   Sig: Inject 1 mL (100 mg total) under the skin every 24 hours for 7 days Start Lovenox injection daily on 4/5/2024 and continue through and including morning 4/7/2024 then stop and then Start Lovenox injection daily on 4/9/2024 and continue through 4/12/2024 or until PT/INR ( coumadin level) is 2.0 or greater as proven by post surgery lab test with monitoring by your PCP or surgical team Do not start before April 5, 2024.   ezetimibe (ZETIA) 10 mg tablet Unknown Self No No   Sig: TAKE 1 TABLET BY MOUTH EVERY DAY   folic acid (FOLVITE) 1 mg tablet Past Week Self No Yes   Sig: TAKE 1 TABLET BY MOUTH DAILY   gabapentin (NEURONTIN) 300 mg capsule 4/7/2024 Self No Yes   Sig: TAKE 1 CAPSULE BY MOUTH DAILY AT BEDTIME   glucose blood (Accu-Chek Guide) test strip  Self No No   Sig: Use one new strip daily dx r73.01   metoprolol succinate (TOPROL-XL) 25 mg 24 hr tablet 4/7/2024 Self No Yes   Sig: TAKE 1 TABLET BY MOUTH TWICE A DAY   mometasone (ELOCON) 0.1 % cream  Self No No   Sig: APPLY TO AFFECTED AREA TOPICALLY EVERY DAY   nitroglycerin  (NITROSTAT) 0.4 mg SL tablet  Self No Yes   Sig: Place 1 tablet (0.4 mg total) under the tongue every 5 (five) minutes as needed for chest pain   potassium chloride (MICRO-K) 10 MEQ CR capsule Past Week  No Yes   Sig: TAKE 1 CAPSULE BY MOUTH TWICE A DAY   tamsulosin (FLOMAX) 0.4 mg 4/7/2024 Self No Yes   Sig: TAKE 1 CAPSULE BY MOUTH DAILY  WITH DINNER   tirzepatide (Mounjaro) 2.5 MG/0.5ML 3/25/2024  No Yes   Sig: Inject 0.5 mL (2.5 mg total) under the skin every 7 days   torsemide (DEMADEX) 20 mg tablet Past Week  No Yes   Sig: TAKE 30 MG ALTERNATE WITH 20 MG DAILY   traMADol HCl 100 MG TABS 4/7/2024  No Yes   Sig: Take 100 mg by mouth 2 (two) times a day   warfarin (COUMADIN) 5 mg tablet 4/3/2024 Self No Yes   Sig: TAKE ONE-HALF TABLET BY MOUTH  DAILY , EXCEPT TAKE 1 TABLET BY  MOUTH ON WEDNESDAY AND SATURDAY   zolpidem (AMBIEN) 10 mg tablet More than a month  No No   Sig: TAKE 1 TABLET BY MOUTH ONCE  DAILY AT BEDTIME AS NEEDED FOR  SLEEP      Facility-Administered Medications: None       Allergies   Allergen Reactions    Morphine GI Intolerance    Vitamin K Other (See Comments)     On coumadin-patient sensitive to vitamin K amounts in meds etc.       Objective     Temp:  [97.4 °F (36.3 °C)-98 °F (36.7 °C)] 98 °F (36.7 °C)  HR:  [] 93  Resp:  [10-18] 18  BP: ()/(59-90) 111/72    Physical Exam  Vitals reviewed.   Constitutional:       General: He is not in acute distress.     Appearance: He is not ill-appearing or toxic-appearing.   HENT:      Head: Normocephalic and atraumatic.   Eyes:      Conjunctiva/sclera: Conjunctivae normal.   Cardiovascular:      Rate and Rhythm: Normal rate.   Pulmonary:      Effort: Pulmonary effort is normal.   Chest:      Chest wall: No tenderness.   Abdominal:      General: There is no distension.      Tenderness: There is no abdominal tenderness.   Musculoskeletal:         General: Swelling (R knee) and tenderness (R knee) present.      Cervical back: Normal range of  motion.   Skin:     General: Skin is warm and dry.   Neurological:      Mental Status: He is alert and oriented to person, place, and time. Mental status is at baseline.           Lab Results:   Results from last 7 days   Lab Units 04/09/24  0536   WBC Thousand/uL 13.31*   HEMOGLOBIN g/dL 10.1*   HEMATOCRIT % 32.3*   PLATELETS Thousands/uL 139*      Results from last 7 days   Lab Units 04/09/24  0536   POTASSIUM mmol/L 3.9   CHLORIDE mmol/L 104   CO2 mmol/L 27   BUN mg/dL 32*   CREATININE mg/dL 1.85*   CALCIUM mg/dL 8.8       Imaging Studies: I have personally reviewed pertinent reports.        Counseling / Coordination of Care  Total floor / unit time spent today 15 minutes. Greater than 50% of total time was spent with the patient and / or family counseling and / or coordination of care. A description of the counseling / coordination of care: Patient interview, physical examination, review of PDMP, review of medical record, review of imaging and laboratory data, development of pain management plan, discussion of pain management plan with patient and primary service.      Please note that the APS provides consultative services regarding pain management only.  With the exception of ketamine, peripheral nerve catheters, and epidural infusions (and except when indicated), final decisions regarding starting or changing doses of analgesic medications are at the discretion of the consulting service.  Off hours consultation and/or medication management is generally not available.    DEEPTHI Carter  Acute Pain Service

## 2024-04-10 ENCOUNTER — TELEPHONE (OUTPATIENT)
Dept: OBGYN CLINIC | Facility: HOSPITAL | Age: 74
End: 2024-04-10

## 2024-04-10 ENCOUNTER — OFFICE VISIT (OUTPATIENT)
Dept: PHYSICAL THERAPY | Facility: CLINIC | Age: 74
End: 2024-04-10
Payer: COMMERCIAL

## 2024-04-10 DIAGNOSIS — M17.11 PRIMARY OSTEOARTHRITIS OF RIGHT KNEE: Primary | ICD-10-CM

## 2024-04-10 DIAGNOSIS — R26.2 AMBULATORY DYSFUNCTION: ICD-10-CM

## 2024-04-10 PROCEDURE — 97110 THERAPEUTIC EXERCISES: CPT | Performed by: PHYSICAL THERAPIST

## 2024-04-10 PROCEDURE — 97164 PT RE-EVAL EST PLAN CARE: CPT | Performed by: PHYSICAL THERAPIST

## 2024-04-10 NOTE — TELEPHONE ENCOUNTER
Patient contacted for a postoperative follow up assessment. Patient states current pain level of a  5-6/10  when sitting and 8-9/10 when walking with RW. Patient states they have minimal pain and it is relieved with medication regimen. Patient denies increase in swelling and dressing is clean, dry and intact. Patient is icing the site regularly. PT 4/10 at 2PM.     We reviewed patients AVS medication list. Patient is taking Tylenol 650mg every 8 hours, Oxycodone 5mg every hour, Gabapentin 300mg at bedtime, Lovenox injections, Warfarin, Colace 100mg BID. Patient has not yet had a BM but is passing gas.      Patient denies nausea, vomiting, abdominal pain, chest pain, shortness of breath, fever, dizziness and calf pain. Patient confirmed post-op appointment with surgeon on  4/23 at 1PM .Patient does not have any other questions or concerns at this time. Pt was encouraged to call with any questions, concerns or issues.

## 2024-04-10 NOTE — PROGRESS NOTES
Re-evaluation Note     Today's date: 4/10/2024  Patient name: Tian Garcia  : 1950  MRN: [id number]  Referring provider: Dot Galindo MD  Dx:   Encounter Diagnosis     ICD-10-CM    1. Primary osteoarthritis of right knee  M17.11       2. Ambulatory dysfunction  R26.2                      Subjective:Was d/c home yesterday post surgery. Has been doing exericses 1x/day. Able to ascend/descend stairs. Able to walk with RW.       Objective: See treatment diary below    4/10 measurements:  R knee ext ROM: -7  R Knee flex ROM: 89     R Knee ext MMT: 2/5  R knee flex MMT: 2/5      Assessment:   Reviewed Hep with patient once again. Pt is able to recruit good quad contraction and by end of session had 3/5 MMT knee extension. Fair ROM post surgically. Pt is able to ambulate with step through pattern with RW. Gloria nto continue 3x/week for 2 weeks and 2x/week for 6 weeks post.     Plan: Continue per plan of care.      Short Term Goal Expiration Date:(24)  Long Term Goal Expiration Date: (24)  POC Expiration Date: (24)      POC expires Unit limit Auth Expiration date PT/OT/ST + Visit Limit?   24 bomn bomn bomn                           Visit/Unit Tracking  AUTH Status:  Date 3/27             pending Used 1              Remaining                     Precautions fall risk       Manuals 3/27 4/10                                      Neuro Re-Ed                                                                 Ther Ex        Ankle pumps        Glut sets  5 sec x 15      Supine Heel slide  10 x 2      Supine Quad set  5 sec hold x 15      LAQ        Supine hip abd  2x10       SAQ  2x 10               Ther Activity                        Gait Training                        Modalities

## 2024-04-12 ENCOUNTER — OFFICE VISIT (OUTPATIENT)
Dept: PHYSICAL THERAPY | Facility: CLINIC | Age: 74
End: 2024-04-12
Payer: COMMERCIAL

## 2024-04-12 DIAGNOSIS — M17.11 PRIMARY OSTEOARTHRITIS OF RIGHT KNEE: Primary | ICD-10-CM

## 2024-04-12 DIAGNOSIS — R26.2 AMBULATORY DYSFUNCTION: ICD-10-CM

## 2024-04-12 PROCEDURE — 97112 NEUROMUSCULAR REEDUCATION: CPT | Performed by: PHYSICAL THERAPIST

## 2024-04-12 PROCEDURE — 97110 THERAPEUTIC EXERCISES: CPT | Performed by: PHYSICAL THERAPIST

## 2024-04-12 NOTE — PROGRESS NOTES
Daily Note     Today's date: 2024  Patient name: Tian Garcia  : 1950  MRN: [id number]  Referring provider: Dot Galindo MD  Dx:   Encounter Diagnosis     ICD-10-CM    1. Primary osteoarthritis of right knee  M17.11       2. Ambulatory dysfunction  R26.2                      Subjective:   Patient repots no new changes since last session.     Objective: See treatment diary below      Assessment: Patient was able to do some sit to stands and also straight leg raises today as a progressin of activity.       Plan: Continue per plan of care.      Short Term Goal Expiration Date:(24)  Long Term Goal Expiration Date: (24)  POC Expiration Date: (24)      POC expires Unit limit Auth Expiration date PT/OT/ST + Visit Limit?   24 bomn bomn bomn                           Visit/Unit Tracking  AUTH Status:  Date 3/27 4/12            pending Used 1 3             Remaining                     Precautions fall risk       Manuals 3/27 4/10 4/12                                     Neuro Re-Ed         Sit to stand   10x                                                     Ther Ex        Ankle pumps        Glut sets  5 sec x 15      Supine Heel slide  10 x 2 10x 2 with overpressure by therapist     Supine Quad set  5 sec hold x 15 5 sec hold x 15     LAQ   10x 3     Supine hip abd  2x10  2x10     SAQ  2x 10  2x10     SLR   2x10     Ther Activity                        Gait Training                        Modalities

## 2024-04-15 ENCOUNTER — OFFICE VISIT (OUTPATIENT)
Dept: PHYSICAL THERAPY | Facility: CLINIC | Age: 74
End: 2024-04-15
Payer: COMMERCIAL

## 2024-04-15 DIAGNOSIS — M17.11 PRIMARY OSTEOARTHRITIS OF RIGHT KNEE: Primary | ICD-10-CM

## 2024-04-15 DIAGNOSIS — R26.2 AMBULATORY DYSFUNCTION: ICD-10-CM

## 2024-04-15 PROCEDURE — 97110 THERAPEUTIC EXERCISES: CPT | Performed by: PHYSICAL THERAPIST

## 2024-04-15 PROCEDURE — 97112 NEUROMUSCULAR REEDUCATION: CPT | Performed by: PHYSICAL THERAPIST

## 2024-04-15 NOTE — PROGRESS NOTES
"Daily Note     Today's date: 4/15/2024  Patient name: Tian Garcia  : 1950  MRN: [id number]  Referring provider: Dot Galindo MD  Dx:   Encounter Diagnosis     ICD-10-CM    1. Primary osteoarthritis of right knee  M17.11       2. Ambulatory dysfunction  R26.2           Start Time: 1115  Stop Time: 1200  Total time in clinic (min): 45 minutes    Subjective: Patient did lots of exercise over the weekend.       Objective: See treatment diary below      Assessment: Attempted more standing exercises today as well as Nustep which turned out well. Patient did very well with more weight bearing exercise today. Plan to attempt more gait training next session.       Plan: Continue per plan of care.      Short Term Goal Expiration Date:(24)  Long Term Goal Expiration Date: (24)  POC Expiration Date: (24)      POC expires Unit limit Auth Expiration date PT/OT/ST + Visit Limit?   24 bomn bomn bomn                           Visit/Unit Tracking  AUTH Status:  Date 3/27 4/12            pending Used 1 3             Remaining                     Precautions fall risk       Manuals 3/27 4/10 4/12 4/15                                    Neuro Re-Ed         Sit to stand   10x 10x2    Step ups    6\" fwd/lat 10x ea                                             Ther Ex        Ankle pumps        NUstep    5 min L5    Heel raise    30x    Glut sets  5 sec x 15      Supine Heel slide  10 x 2 10x 2 with overpressure by therapist 10x 2 with over pressure - 105 degrees flex    Supine Quad set  5 sec hold x 15 5 sec hold x 15     LAQ   10x 3     Supine hip abd  2x10  2x10 Sidelying - 2x10 with assist    SAQ  2x 10  2x10     SLR   2x10 2x10    Ther Activity                        Gait Training                        Modalities                                          "
I personally performed the service described in the documentation recorded by the scribe in my presence, and it accurately and completely records my words and actions.

## 2024-04-16 ENCOUNTER — ANTICOAG VISIT (OUTPATIENT)
Dept: FAMILY MEDICINE CLINIC | Facility: HOSPITAL | Age: 74
End: 2024-04-16

## 2024-04-16 LAB — PHOSPHATE SERPL-MCNC: 3.4 MG/DL (ref 2.8–4.1)

## 2024-04-17 ENCOUNTER — OFFICE VISIT (OUTPATIENT)
Dept: PHYSICAL THERAPY | Facility: CLINIC | Age: 74
End: 2024-04-17
Payer: COMMERCIAL

## 2024-04-17 DIAGNOSIS — R26.2 AMBULATORY DYSFUNCTION: ICD-10-CM

## 2024-04-17 DIAGNOSIS — M17.11 PRIMARY OSTEOARTHRITIS OF RIGHT KNEE: Primary | ICD-10-CM

## 2024-04-17 PROCEDURE — 97110 THERAPEUTIC EXERCISES: CPT | Performed by: PHYSICAL THERAPIST

## 2024-04-17 PROCEDURE — 97112 NEUROMUSCULAR REEDUCATION: CPT | Performed by: PHYSICAL THERAPIST

## 2024-04-19 ENCOUNTER — OFFICE VISIT (OUTPATIENT)
Dept: PHYSICAL THERAPY | Facility: CLINIC | Age: 74
End: 2024-04-19
Payer: COMMERCIAL

## 2024-04-19 DIAGNOSIS — R26.2 AMBULATORY DYSFUNCTION: ICD-10-CM

## 2024-04-19 DIAGNOSIS — M17.11 PRIMARY OSTEOARTHRITIS OF RIGHT KNEE: Primary | ICD-10-CM

## 2024-04-19 PROCEDURE — 97112 NEUROMUSCULAR REEDUCATION: CPT | Performed by: PHYSICAL THERAPIST

## 2024-04-19 PROCEDURE — 97110 THERAPEUTIC EXERCISES: CPT | Performed by: PHYSICAL THERAPIST

## 2024-04-19 NOTE — PROGRESS NOTES
"Daily Note     Today's date: 2024  Patient name: Tian Garcia  : 1950  MRN: [id number]  Referring provider: Dot Galindo MD  Dx:   Encounter Diagnosis     ICD-10-CM    1. Primary osteoarthritis of right knee  M17.11       2. Ambulatory dysfunction  R26.2                      Subjective: having more incision pain lately      Objective: See treatment diary below      Assessment: Continues to struggle with sidelying hip abduction due to significantly decreased strength in b/l hip abductors. Reviewed execises that are part of HEP to help strengthen. Attempted walking in /bars without ad with good balance noted but still slow and poor control of hips.       Plan: Continue per plan of care.      Short Term Goal Expiration Date:(24)  Long Term Goal Expiration Date: (24)  POC Expiration Date: (24)      POC expires Unit limit Auth Expiration date PT/OT/ST + Visit Limit?   24 bomn bomn bomn                           Visit/Unit Tracking  AUTH Status:  Date 3/27 4/12 4/19           pending Used 1 3 4            Remaining                     Precautions fall risk       Manuals 4/19  4/12 4/15 4/17                                   Neuro Re-Ed         Sit to stand 2x10  10x 10x2 10x2   Step ups    6\" fwd/lat 10x ea  6\" fwd/lat 10x ea                                            Ther Ex        Ankle pumps        NUstep 5 min L5   5 min L5 5 min L5   Heel raise    30x 20x   Glut sets Bridges - 2x10       Supine Heel slide 10x 2 no over pressure  10x 2 with overpressure by therapist 10x 2 with over pressure - 105 degrees flex 10 x 2 no overpressure this date   Supine Quad set   5 sec hold x 15     LAQ   10x 3     Supine hip abd Sidelying 2x10 with assist  2x10 Sidelying - 2x10 with assist    SAQ   2x10     SLR 2x10  2x10 2x10 2x10   Ther Activity                        Gait Training        No AD  In //bars 10 laps                Modalities                                              "

## 2024-04-22 ENCOUNTER — OFFICE VISIT (OUTPATIENT)
Dept: PHYSICAL THERAPY | Facility: CLINIC | Age: 74
End: 2024-04-22
Payer: COMMERCIAL

## 2024-04-22 DIAGNOSIS — M17.11 PRIMARY OSTEOARTHRITIS OF RIGHT KNEE: Primary | ICD-10-CM

## 2024-04-22 DIAGNOSIS — R26.2 AMBULATORY DYSFUNCTION: ICD-10-CM

## 2024-04-22 PROCEDURE — 97112 NEUROMUSCULAR REEDUCATION: CPT | Performed by: PHYSICAL THERAPIST

## 2024-04-22 NOTE — PROGRESS NOTES
"Daily Note     Today's date: 2024  Patient name: Tian Garcia  : 1950  MRN: [id number]  Referring provider: Dot Galindo MD  Dx:   Encounter Diagnosis     ICD-10-CM    1. Primary osteoarthritis of right knee  M17.11       2. Ambulatory dysfunction  R26.2                      Subjective: Pt arrived walking with SPC today.       Objective: See treatment diary below      Assessment: Added weight to SAQ and LAQ this session. Also added stepping over hurdles this date which he was able to tolerate well with only unilateral UE assist.       Plan: Continue per plan of care.      Short Term Goal Expiration Date:(24)  Long Term Goal Expiration Date: (24)  POC Expiration Date: (24)      POC expires Unit limit Auth Expiration date PT/OT/ST + Visit Limit?   24 bomn bomn bomn                           Visit/Unit Tracking  AUTH Status:  Date 3/27 4/12 4/19 4/22          pending Used 1 3 4 7           Remaining     5                Precautions fall risk       Manuals                                    Neuro Re-Ed         Sit to stand 2x10 2x10   10x2   Step ups  15x fwd/lat   6\" fwd/lat 10x ea                                            Ther Ex        Ankle pumps        NUstep 5 min L5    5 min L5   Heel raise  30x   20x   Glut sets Bridges - 2x10       Supine Heel slide 10x 2 no over pressure 10x 2 with overpressure by therapist   10 x 2 no overpressure this date   Supine Quad set        LAQ  4# aw R LE 2x10      Supine hip abd Sidelying 2x10 with assist       SAQ  4# aw 2x10      SLR 2x10 2x10   2x10   Ther Activity                        Gait Training        No AD  In //bars 10 laps                Modalities                                                "

## 2024-04-23 ENCOUNTER — TELEPHONE (OUTPATIENT)
Age: 74
End: 2024-04-23

## 2024-04-23 ENCOUNTER — OFFICE VISIT (OUTPATIENT)
Dept: OBGYN CLINIC | Facility: CLINIC | Age: 74
End: 2024-04-23

## 2024-04-23 ENCOUNTER — TELEPHONE (OUTPATIENT)
Dept: CARDIOLOGY CLINIC | Facility: CLINIC | Age: 74
End: 2024-04-23

## 2024-04-23 ENCOUNTER — HOSPITAL ENCOUNTER (OUTPATIENT)
Dept: RADIOLOGY | Facility: HOSPITAL | Age: 74
Discharge: HOME | End: 2024-04-23
Payer: COMMERCIAL

## 2024-04-23 VITALS — BODY MASS INDEX: 32.62 KG/M2 | WEIGHT: 233 LBS | HEIGHT: 71 IN

## 2024-04-23 DIAGNOSIS — Z96.651 S/P TOTAL KNEE REPLACEMENT USING CEMENT, RIGHT: ICD-10-CM

## 2024-04-23 DIAGNOSIS — Z96.651 S/P TOTAL KNEE REPLACEMENT USING CEMENT, RIGHT: Primary | ICD-10-CM

## 2024-04-23 PROCEDURE — 73562 X-RAY EXAM OF KNEE 3: CPT

## 2024-04-23 PROCEDURE — 99024 POSTOP FOLLOW-UP VISIT: CPT | Performed by: PHYSICIAN ASSISTANT

## 2024-04-23 NOTE — TELEPHONE ENCOUNTER
Caller: Tian    Doctor: Dr Lock    Reason for call: Pt is calling to see if he should restart Plavix now that knee surgery is done or if he should continue the baby aspirin that it was switched to. Please advise.     Call back#: [phone]

## 2024-04-23 NOTE — PROGRESS NOTES
74 y.o.male presents for 2 weeks postoperative visit status post right TKA. Patient denies any chest pain, shortness or breath or calf pain .  Patient is taking coumadin.  Pain is well controlled with medication.  Patient states he is doing well physical therapy ambulate with the assistance of a walker at this time.    Review of Systems  Review of systems negative unless otherwise specified in HPI    Past Medical History  Past Medical History:   Diagnosis Date    Acute venous embolism and thrombosis of deep vessels of proximal lower extremity (HCC)     Last assessed: 5/18/15    JANEL (acute kidney injury) ([facility])     Arthritis     Cellulitis     LE    Chronic kidney disease 03/2020    Acute Kidney Injury    CPAP (continuous positive airway pressure) dependence     Factor V Leiden ([facility])     Forgetfulness     Gross hematuria 05/17/2022    Headache(784.0) 03/2022    Never till cervical  spine surgery    Heart murmur 03/2020    Told when in hospital    History of transfusion     Apache (hard of hearing)     Hypoalbuminemia 05/11/2022    Other acute osteomyelitis, other site ([facility]) 01/03/2023    Paroxysmal atrial fibrillation ([facility])     Last assessed: 11/2/15    Sleep apnea        Past Surgical History  Past Surgical History:   Procedure Laterality Date    BACK SURGERY      Lower    CARDIAC CATHETERIZATION N/A 04/09/2023    Procedure: Cardiac pci;  Surgeon: Bird Cast MD;  Location: [facility];  Service: Cardiology    CARDIAC CATHETERIZATION N/A 04/09/2023    Procedure: Cardiac Coronary Angiogram;  Surgeon: Bird Cast MD;  Location: [facility];  Service: Cardiology    CATARACT EXTRACTION      COLON SURGERY      COLONOSCOPY      INCISION AND DRAINAGE POSTERIOR SPINE N/A 04/01/2022    Procedure: Posterior cervical evacuation of postoperative collection and debridement with placement of drains C3-T1;  Surgeon: Rajeev Garcia MD;  Location: [facility];  Service: Neurosurgery    IR BIOPSY KIDNEY  RANDOM  05/26/2022    IR TUNNELED DIALYSIS CATHETER PLACEMENT  05/23/2022    IR TUNNELED DIALYSIS CATHETER REMOVAL  06/02/2022    OR ARTHRD ANT INTERBODY MIN DSC CRV BELOW C2 N/A 03/11/2022    Procedure: Anterior cervical discectomy and fixation fusion C5/6 and C6/7; Posterior cervical decompression and instrumented fusion C3-T1;  Surgeon: Rajeev Garcia MD;  Location: [facility];  Service: Neurosurgery    OR ARTHRP KNE CONDYLE&PLATU MEDIAL&LAT COMPARTMENTS Right 4/8/2024    Procedure: ARTHROPLASTY KNEE TOTAL and all associated procedures;  Surgeon: Dot Galindo MD;  Location: [facility];  Service: Orthopedics    RENAL BIOPSY  6/2022    SPINE SURGERY  03/11/2022    Cervical myelopathy/ cervical fusion       Current Medications  Current Outpatient Medications on File Prior to Visit   Medication Sig Dispense Refill    acetaminophen (TYLENOL) 650 mg CR tablet Take 1 tablet (650 mg total) by mouth every 8 (eight) hours as needed for mild pain 30 tablet 0    Aspirin 81 MG CAPS Take by mouth      atorvastatin (LIPITOR) 80 mg tablet Take 1 tablet (80 mg total) by mouth every evening 90 tablet 0    DULoxetine (CYMBALTA) 60 mg delayed release capsule TAKE 1 CAPSULE BY MOUTH EVERY DAY 90 capsule 1    ezetimibe (ZETIA) 10 mg tablet TAKE 1 TABLET BY MOUTH EVERY DAY 90 tablet 3    folic acid (FOLVITE) 1 mg tablet TAKE 1 TABLET BY MOUTH DAILY 30 tablet 0    gabapentin (NEURONTIN) 300 mg capsule TAKE 1 CAPSULE BY MOUTH DAILY AT BEDTIME 90 capsule 1    glucose blood (Accu-Chek Guide) test strip Use one new strip daily dx r73.01 100 strip 1    metoprolol succinate (TOPROL-XL) 25 mg 24 hr tablet TAKE 1 TABLET BY MOUTH TWICE A  tablet 1    mometasone (ELOCON) 0.1 % cream APPLY TO AFFECTED AREA TOPICALLY EVERY DAY 45 g 1    nitroglycerin (NITROSTAT) 0.4 mg SL tablet Place 1 tablet (0.4 mg total) under the tongue every 5 (five) minutes as needed for chest pain 30 tablet 0    oxyCODONE (ROXICODONE) 5 immediate  release tablet Take 1 tablets every 6 hrs as needed for pain control 30 tablet 0    potassium chloride (MICRO-K) 10 MEQ CR capsule TAKE 1 CAPSULE BY MOUTH TWICE A  capsule 1    tamsulosin (FLOMAX) 0.4 mg TAKE 1 CAPSULE BY MOUTH DAILY  WITH DINNER 90 capsule 1    tirzepatide (Mounjaro) 2.5 MG/0.5ML Inject 0.5 mL (2.5 mg total) under the skin every 7 days 2 mL 1    torsemide (DEMADEX) 20 mg tablet TAKE 30 MG ALTERNATE WITH 20 MG DAILY 135 tablet 0    warfarin (COUMADIN) 5 mg tablet TAKE ONE-HALF TABLET BY MOUTH  DAILY , EXCEPT TAKE 1 TABLET BY  MOUTH ON WEDNESDAY AND SATURDAY 58 tablet 3    zolpidem (AMBIEN) 10 mg tablet TAKE 1 TABLET BY MOUTH ONCE  DAILY AT BEDTIME AS NEEDED FOR  SLEEP 90 tablet 0    docusate sodium (COLACE) 100 mg capsule Take 1 capsule (100 mg total) by mouth 2 (two) times a day (Patient not taking: Reported on 4/23/2024) 10 capsule 0    enoxaparin (LOVENOX) 100 mg/mL Inject 1 mL (100 mg total) under the skin every 24 hours for 7 days Start Lovenox injection daily on 4/5/2024 and continue through and including morning 4/7/2024 then stop and then Start Lovenox injection daily on 4/9/2024 and continue through 4/12/2024 or until PT/INR ( coumadin level) is 2.0 or greater as proven by post surgery lab test with monitoring by your PCP or surgical team Do not start before April 5, 2024. 7 mL 0    ondansetron (ZOFRAN) 4 mg tablet Take 1 tablet (4 mg total) by mouth every 8 (eight) hours as needed for nausea or vomiting (Patient not taking: Reported on 4/23/2024) 5 tablet 0     No current facility-administered medications on file prior to visit.       Recent Labs (HCT,HGB,PT,INR,ESR,CRP,GLU,HgA1C)  0   Lab Value Date/Time    HCT 32.3 (L) 04/09/2024 0536    HCT 39.5 01/23/2015 0440    HGB 10.1 (L) 04/09/2024 0536    HGB 12.9 01/23/2015 0440    WBC 13.31 (H) 04/09/2024 0536    WBC 8.43 01/23/2015 0440    INR 2.3 (H) 04/15/2024 1313    INR 1.15 04/09/2024 0536    INR 2.2 (H) 01/11/2018 1405      (H) 05/10/2022 1949    CRP 8.2 (H) 05/10/2022 1950    GLUCOSE 102 (H) 10/25/2017 0951    HGBA1C 6.2 (H) 04/09/2023 1902    HGBA1C 6.2 (H) 11/02/2021 1302           Body mass index is 32.5 kg/m².  Wt Readings from Last 3 Encounters:   04/23/24 106 kg (233 lb)   04/08/24 106 kg (233 lb)   03/26/24 111 kg (245 lb)       Physical exam   General: Awake, Alert, Oriented   Eyes: Pupils equal, round and reactive to light    Heart: regular rate and rhythm   Lungs: No audible wheezing   Abdomen: soft  right Lower extremity      Incision well approximated without erythema no tenderness to palpation   5 degrees off of Full knee extension 110 of flexion   Active ankle dorsal and plantarflexion without pain, calf nontender palpation   sensation mildly decreased daniel-incisionally otherwise intact   Distal pulses present      Imaging  X rays of the right knee reviewed.  X rays reveal excellently aligned right total knee arthroplasty without evidence of loosening or osseous abnormality.    Assessment:  Status post 2 weeks right TKA    Plan:  Weight bearing as tolerated right Lower extremity  Physical therapy  [facility] antibiotic prophylaxis recommended  Pain medication as needed  Follow-up in 2 months and repeat x-rays right knee

## 2024-04-23 NOTE — TELEPHONE ENCOUNTER
Caller: patient     Doctor: jean pierre    Reason for call: would like to know when he can start driving?  Please advise     Call back#: [phone]

## 2024-04-24 ENCOUNTER — TELEPHONE (OUTPATIENT)
Age: 74
End: 2024-04-24

## 2024-04-24 ENCOUNTER — OFFICE VISIT (OUTPATIENT)
Dept: PHYSICAL THERAPY | Facility: CLINIC | Age: 74
End: 2024-04-24
Payer: COMMERCIAL

## 2024-04-24 DIAGNOSIS — R26.2 AMBULATORY DYSFUNCTION: ICD-10-CM

## 2024-04-24 DIAGNOSIS — Z96.651 STATUS POST TOTAL KNEE REPLACEMENT USING CEMENT, RIGHT: ICD-10-CM

## 2024-04-24 DIAGNOSIS — M17.11 PRIMARY OSTEOARTHRITIS OF RIGHT KNEE: Primary | ICD-10-CM

## 2024-04-24 PROCEDURE — 97150 GROUP THERAPEUTIC PROCEDURES: CPT | Performed by: PHYSICAL THERAPIST

## 2024-04-24 PROCEDURE — 97110 THERAPEUTIC EXERCISES: CPT | Performed by: PHYSICAL THERAPIST

## 2024-04-24 PROCEDURE — 97112 NEUROMUSCULAR REEDUCATION: CPT | Performed by: PHYSICAL THERAPIST

## 2024-04-24 RX ORDER — OXYCODONE HYDROCHLORIDE 5 MG/1
TABLET ORAL
Qty: 10 TABLET | Refills: 0 | Status: SHIPPED | OUTPATIENT
Start: 2024-04-24

## 2024-04-24 NOTE — TELEPHONE ENCOUNTER
Caller: Tian    Doctor: Antonio Quintanilla    Reason for call: Wanted to know when he will bePt contacted Call Center requested refill of their medication.      Doctor Name: Deepak Crane     Medication Name: Oxycodone     Dosage of Med: 5 mg    Frequency of Med:     Remaining Medication: 1 pill     Pharmacy and Location: [facility]     Pt. Preferred Callback Phone Number: [phone]    Thank you.      Caller: Patient    Doctor: Deepak Crane / Bernie     Reason for call: forgot to ask at his appointment yesterday when he might able to start driving again     Call back#: [phone]

## 2024-04-24 NOTE — PROGRESS NOTES
"Daily Note     Today's date: 2024  Patient name: Tian Garcia  : 1950  MRN: [id number]  Referring provider: Dot Galindo MD  Dx:   Encounter Diagnosis     ICD-10-CM    1. Primary osteoarthritis of right knee  M17.11       2. Ambulatory dysfunction  R26.2                      Subjective: Patient reports to therapy this session post       Objective: See treatment diary below      Assessment: Patient was able to improve knee extension with weight this session and no assist needed with consistent SLR with better extension.       Plan: Continue per plan of care.      Short Term Goal Expiration Date:(24)  Long Term Goal Expiration Date: (24)  POC Expiration Date: (24)      POC expires Unit limit Auth Expiration date PT/OT/ST + Visit Limit?   24 bomn bomn bomn                           Visit/Unit Tracking  AUTH Status:  Date 3/27 4/12 4/19 4/22          pending Used 1 3 4 7           Remaining     5                Precautions fall risk       Manuals                                    Neuro Re-Ed         Sit to stand 2x10 2x10 2x10 min UE  10x2   Step ups  15x fwd/lat 2x10 8\" step fwd  6\" fwd/lat 10x ea                                            Ther Ex        Ankle pumps        NUstep 5 min L5  5 min L5  5 min L5   Heel raise  30x   20x   Glut sets Bridges - 2x10  Bridges 2x10     Supine Heel slide 10x 2 no over pressure 10x 2 with overpressure by therapist   10 x 2 no overpressure this date   Supine Quad set        LAQ  4# aw R LE 2x10 4# aw R LE 2x10     Supine hip abd Sidelying 2x10 with assist       SAQ  4# aw 2x10 4# aw 2x10     SLR 2x10 2x10 2x10  2x10   Ther Activity                        Gait Training        No AD  In //bars 10 laps                Modalities                                                  "

## 2024-04-25 NOTE — TELEPHONE ENCOUNTER
Called - left very brief message on pt's voicemail.  Sent physician's response to pt verbatim via My Chart.

## 2024-04-29 ENCOUNTER — APPOINTMENT (OUTPATIENT)
Dept: PHYSICAL THERAPY | Facility: CLINIC | Age: 74
End: 2024-04-29
Payer: COMMERCIAL

## 2024-04-29 DIAGNOSIS — R73.01 IFG (IMPAIRED FASTING GLUCOSE): ICD-10-CM

## 2024-04-29 RX ORDER — TIRZEPATIDE 2.5 MG/.5ML
INJECTION, SOLUTION SUBCUTANEOUS
Qty: 2 ML | Refills: 1 | Status: SHIPPED | OUTPATIENT
Start: 2024-04-29

## 2024-05-01 ENCOUNTER — OFFICE VISIT (OUTPATIENT)
Dept: PHYSICAL THERAPY | Facility: CLINIC | Age: 74
End: 2024-05-01
Payer: COMMERCIAL

## 2024-05-01 DIAGNOSIS — R26.2 AMBULATORY DYSFUNCTION: ICD-10-CM

## 2024-05-01 DIAGNOSIS — M17.11 PRIMARY OSTEOARTHRITIS OF RIGHT KNEE: Primary | ICD-10-CM

## 2024-05-01 DIAGNOSIS — G47.00 INSOMNIA, UNSPECIFIED TYPE: ICD-10-CM

## 2024-05-01 PROCEDURE — 97110 THERAPEUTIC EXERCISES: CPT | Performed by: PHYSICAL THERAPIST

## 2024-05-01 PROCEDURE — 97112 NEUROMUSCULAR REEDUCATION: CPT | Performed by: PHYSICAL THERAPIST

## 2024-05-01 NOTE — PROGRESS NOTES
Progress Note     Today's date: 2024  Patient name: Tian Garcia  : 1950  MRN: [id number]  Referring provider: Dot Galindo MD  Dx: No diagnosis found.               Subjective: Patient reports to therpay this date reporting being a bit sore from walking around [employer] for a long time      Objective: See treatment diary below    TUG: - 17.13 sec with rollator  (IE)   - 13 sec with RW    MMT   R knee: 3/5 extension  4/5 flexion  R hip abd: 2+/5  SLR extension lag: 15degrees    ROM:  R knee flex: 117 degrees  R knee ext: 0 degrees         Assessment: Patient presents today for progress update. At this time patient has made significant improvements post surgery with his right knee range of motion, strength, coordination and has improved overall balance per TUG testing. He continues to benefit from use of RW with ambulation due to lack of hpi strength b/l with compensatory patterns noted due to lack of strength. Pt will continue to benefi tform skilled therapy intervention 2x/week for 4 more weeks in order to transition to use of cane, improve balance, improve strength, neuromuscular coordination and improve overall functional mobility.     Goals  STG  Pt will have 90 degrees knee flexion ROM in 4 weeks - met  Pt will have 0 degrees knee extension ROM in 4 weeks - met  Pt will have 3/5 MMT of right knee extension in 4 weeks - met  Pt will be independent with HEP in 4 weeks - met  Pt will be able to ascend/descend stairs independently in 4 weeks - met     LTG  Pt will have full knee ROM of R knee in 8 weeks  Pt will have at least 4/5 MMT strength of right knee extensors in 8 weeks  Pt will be independent with mobility using LRAD in 8 weeks  Pt will complete TUG below 13 seconds in 8 weeks    Plan: Continue per plan of care.      Short Term Goal Expiration Date:(24)  Long Term Goal Expiration Date: (24)  POC Expiration Date: (24)      POC expires Unit limit Auth Expiration date PT/OT/ST  "+ Visit Limit?   6/5/24 bomn 5/17/24 bomn                           Visit/Unit Tracking  AUTH Status:  Date 3/27 4/12 4/19 4/22 5/1         approved Used 1 3 4 7 9          Remaining     5 3               Precautions fall risk       Manuals 4/19 4/22 4/24 5/1 4/17                                   Neuro Re-Ed         Sit to stand 2x10 2x10 2x10 min UE  10x2   Step ups  15x fwd/lat 2x10 8\" step fwd 6\" step with 5# aw fwd/lat 10x ea with HR 6\" fwd/lat 10x ea    Heel tap    4\" step L LE 10x with HHA    Side stepping    At HR pink TB 3 laps                             Ther Ex        Ankle pumps        NUstep 5 min L5  5 min L5 6 min L5 5 min L5   Heel raise  30x   20x   Glut sets Bridges - 2x10  Bridges 2x10     Supine Heel slide 10x 2 no over pressure 10x 2 with overpressure by therapist   10 x 2 no overpressure this date   Supine Quad set        LAQ  4# aw R LE 2x10 4# aw R LE 2x10 5# aw R LE 2x10    Supine hip abd Sidelying 2x10 with assist       SAQ  4# aw 2x10 4# aw 2x10     SLR 2x10 2x10 2x10  2x10   Ther Activity                        Gait Training        No AD  In //bars 10 laps                Modalities                                                    "

## 2024-05-02 RX ORDER — ZOLPIDEM TARTRATE 10 MG/1
10 TABLET ORAL
Qty: 90 TABLET | Refills: 0 | Status: SHIPPED | OUTPATIENT
Start: 2024-05-02

## 2024-05-03 ENCOUNTER — OFFICE VISIT (OUTPATIENT)
Dept: PHYSICAL THERAPY | Facility: CLINIC | Age: 74
End: 2024-05-03
Payer: COMMERCIAL

## 2024-05-03 DIAGNOSIS — M17.11 PRIMARY OSTEOARTHRITIS OF RIGHT KNEE: Primary | ICD-10-CM

## 2024-05-03 DIAGNOSIS — R26.2 AMBULATORY DYSFUNCTION: ICD-10-CM

## 2024-05-03 PROCEDURE — 97110 THERAPEUTIC EXERCISES: CPT | Performed by: PHYSICAL THERAPIST

## 2024-05-03 PROCEDURE — 97112 NEUROMUSCULAR REEDUCATION: CPT | Performed by: PHYSICAL THERAPIST

## 2024-05-03 NOTE — PROGRESS NOTES
"Daily Note     Today's date: 5/3/2024  Patient name: Tian Garcia  : 1950  MRN: [id number]  Referring provider: Dot Galindo MD  Dx: No diagnosis found.               Subjective: Having a lot of sorenss today      Objective: See treatment diary below      Assessment: Pt was sore through session moreso than previous. Most soreness experienced with eccentric lowering. Focused more on hip strengthening this session which pt struggled with.       Plan: Continue per plan of care.      Short Term Goal Expiration Date:(24)  Long Term Goal Expiration Date: (24)  POC Expiration Date: (24)      POC expires Unit limit Auth Expiration date PT/OT/ST + Visit Limit?   24 bomn 24 bomn                           Visit/Unit Tracking  AUTH Status:  Date 3/27 4/12 4/19 4/22 5/1 5/3        approved Used 1 3 4 7 9 10         Remaining     5 3 2              Precautions fall risk       Manuals 4/19 4/22 4/24 5/1 5/3                                   Neuro Re-Ed         Sit to stand 2x10 2x10 2x10 min UE  2x10   Step ups  15x fwd/lat 2x10 8\" step fwd 6\" step with 5# aw fwd/lat 10x ea with HR    Heel tap    4\" step L LE 10x with HHA 4\" step L LE 10x with HHA   Side stepping    At HR pink TB 3 laps  3 laps with HHA                           Ther Ex        Ankle pumps        NUstep 5 min L5  5 min L5 6 min L5 6 min L7   Heel raise  30x      Glut sets Bridges - 2x10  Bridges 2x10     Supine Heel slide 10x 2 no over pressure 10x 2 with overpressure by therapist      Supine Quad set        LAQ  4# aw R LE 2x10 4# aw R LE 2x10 5# aw R LE 2x10 5# 2x10    Supine hip abd Sidelying 2x10 with assist       SAQ  4# aw 2x10 4# aw 2x10     SLR 2x10 2x10 2x10  3x10   Ther Activity                        Gait Training        No AD  In //bars 10 laps                Modalities                                                      "

## 2024-05-06 ENCOUNTER — APPOINTMENT (OUTPATIENT)
Dept: PHYSICAL THERAPY | Facility: CLINIC | Age: 74
End: 2024-05-06
Payer: COMMERCIAL

## 2024-05-07 ENCOUNTER — TELEPHONE (OUTPATIENT)
Age: 74
End: 2024-05-07

## 2024-05-07 NOTE — TELEPHONE ENCOUNTER
Calling back to provide covermymeds key code pertaining to prior auth information below for patients Zolpidem #[id number].  Routing to office and prior auth department for documentation

## 2024-05-07 NOTE — TELEPHONE ENCOUNTER
Licha, from [facility], called regarding the PA for Zolpidem. Requesting a diagnosis code and previous medications tried before Zolpidem.     Verified our fax number and Licha stated she will be sending another form over via fax to the office regarding the above information.    Please advise further.

## 2024-05-08 ENCOUNTER — TELEPHONE (OUTPATIENT)
Dept: FAMILY MEDICINE CLINIC | Facility: HOSPITAL | Age: 74
End: 2024-05-08

## 2024-05-08 ENCOUNTER — OFFICE VISIT (OUTPATIENT)
Dept: PHYSICAL THERAPY | Facility: CLINIC | Age: 74
End: 2024-05-08
Payer: COMMERCIAL

## 2024-05-08 DIAGNOSIS — R26.2 AMBULATORY DYSFUNCTION: ICD-10-CM

## 2024-05-08 DIAGNOSIS — M17.11 PRIMARY OSTEOARTHRITIS OF RIGHT KNEE: Primary | ICD-10-CM

## 2024-05-08 PROCEDURE — 97116 GAIT TRAINING THERAPY: CPT | Performed by: PHYSICAL THERAPIST

## 2024-05-08 PROCEDURE — 97112 NEUROMUSCULAR REEDUCATION: CPT | Performed by: PHYSICAL THERAPIST

## 2024-05-08 NOTE — TELEPHONE ENCOUNTER
[facility] called Rx refill line stating zolpidem 10mg has been denied and will be faxing a letter to the office. Case # is [id number]

## 2024-05-10 ENCOUNTER — OFFICE VISIT (OUTPATIENT)
Dept: PHYSICAL THERAPY | Facility: CLINIC | Age: 74
End: 2024-05-10
Payer: COMMERCIAL

## 2024-05-10 ENCOUNTER — TELEPHONE (OUTPATIENT)
Age: 74
End: 2024-05-10

## 2024-05-10 DIAGNOSIS — R26.2 AMBULATORY DYSFUNCTION: ICD-10-CM

## 2024-05-10 DIAGNOSIS — M17.11 PRIMARY OSTEOARTHRITIS OF RIGHT KNEE: Primary | ICD-10-CM

## 2024-05-10 PROCEDURE — 97110 THERAPEUTIC EXERCISES: CPT | Performed by: PHYSICAL THERAPIST

## 2024-05-10 PROCEDURE — 97112 NEUROMUSCULAR REEDUCATION: CPT | Performed by: PHYSICAL THERAPIST

## 2024-05-10 NOTE — PROGRESS NOTES
"Daily Note     Today's date: 5/10/2024  Patient name: Tian Garcia  : 1950  MRN: [id number]  Referring provider: Dot Galindo MD  Dx:   Encounter Diagnosis     ICD-10-CM    1. Primary osteoarthritis of right knee  M17.11       2. Ambulatory dysfunction  R26.2                      Subjective: Feeling a little sore lately      Objective: See treatment diary below      Assessment:Increased overall weight bearing activity this session compared to previous. Pt demonstrated improved ability to do some eccentric lowering today. Encouraged pt to cntinue with it at home as well as to continue to focus on hip abduction activity.       Plan: Continue per plan of care.      Short Term Goal Expiration Date:(24)  Long Term Goal Expiration Date: (24)  POC Expiration Date: (24)      POC expires Unit limit Auth Expiration date PT/OT/ST + Visit Limit?   24 bomn 24 bomn                           Visit/Unit Tracking  AUTH Status:  Date 3/27 4/12 4/19 4/22 5/1 5/3 5/8       approved Used 1 3 4 7 9 10 11        Remaining     5 3 2 1             Precautions fall risk       Manuals 5/10  4/24 5/1 5/3                                   Neuro Re-Ed         Sit to stand   2x10 min UE  2x10   Step ups 8\" step 2x10 fwd  2x10 8\" step fwd 6\" step with 5# aw fwd/lat 10x ea with HR    Heel tap 4\" HHA R LE 10x 2   4\" step L LE 10x with HHA 4\" step L LE 10x with HHA   Side stepping 3 laps pink TB with UE   At HR pink TB 3 laps  3 laps with HHA                           Ther Ex        Ankle pumps        NUstep   5 min L5 6 min L5 6 min L7   Heel raise        Glut sets   Bridges 2x10     Supine Heel slide        Supine Quad set        LAQ   4# aw R LE 2x10 5# aw R LE 2x10 5# 2x10    Supine hip abd Sidelying - 2x10    Clamshell - 2x10       SAQ   4# aw 2x10     SLR   2x10  3x10   Ther Activity                        Gait Training        No AD SPC - sidewalk 100 ft    No AD - 50 ft               Modalities            "

## 2024-05-10 NOTE — PROGRESS NOTES
"Daily Note     Today's date: 5/10/2024  Patient name: Tian Garcia  : 1950  MRN: [id number]  Referring provider: Dot Galindo MD  Dx:   Encounter Diagnosis     ICD-10-CM    1. Primary osteoarthritis of right knee  M17.11       2. Ambulatory dysfunction  R26.2                      Subjective: Mowed his lawn yesterday which went well. Was on his feet a lot. Has a calf muscle cramp today.       Objective: See treatment diary below      Assessment: Improved quad lag thi ssession with SLR as well as improved ability to control heel dip. Improved pain with stretching calf muscle on step. Plan next session to attempt 6\" step heel dips.       Plan: Continue per plan of care.      Short Term Goal Expiration Date:(24)  Long Term Goal Expiration Date: (24)  POC Expiration Date: (24)      POC expires Unit limit Auth Expiration date PT/OT/ST + Visit Limit?   24 bomn 24 bomn                           Visit/Unit Tracking  AUTH Status:  Date 3/27 4/12 4/19 4/22 5/1 5/3 5/8 5/10      approved Used 1 3 4 7 9 10 11 12       Remaining     5 3 2 1 0            Precautions fall risk       Manuals 5/8 5/10 4/24 5/1 5/3                                   Neuro Re-Ed         Sit to stand   2x10 min UE  2x10   Step ups 8\" step 2x10 fwd  2x10 8\" step fwd 6\" step with 5# aw fwd/lat 10x ea with HR    Heel tap 4\" HHA R LE 10x 2 4\" hha R LE 10x2  4\" step L LE 10x with HHA 4\" step L LE 10x with HHA   Side stepping 3 laps pink TB with UE   At HR pink TB 3 laps  3 laps with HHA                           Ther Ex        Ankle pumps        NUstep  5 min L5 5 min L5 6 min L5 6 min L7   Heel raise  Stretch - 30 sec x 2      Glut sets   Bridges 2x10     Supine Heel slide        Supine Quad set        LAQ   4# aw R LE 2x10 5# aw R LE 2x10 5# 2x10    Supine hip abd Sidelying - 2x10    Clamshell - 2x10 Sidelying - 2x10    Clamshell - 2x10      SAQ   4# aw 2x10     SLR   2x10  3x10   Ther Activity                      "   Gait Training        No AD SPC - sidewalk 100 ft    No AD - 50 ft               Modalities

## 2024-05-10 NOTE — TELEPHONE ENCOUNTER
Patient called and states the pharmacy said he needs a prior authorization for his Zolpidem, but he has been on it for years. I told him I would send everything over to our prior authorization team.     Reason for call:   [x] Prior Auth  [] Other:     Caller:  [x] Patient  [] Pharmacy  Name:   Address:   Callback Number:     Medication: Zolpidem     Dose/Frequency: 10 mg tablet taken by mouth daily at bedtime PRN for sleep     Quantity: 90    Ordering Provider:   [x] PCP/Provider -   [] Speciality/Provider -

## 2024-05-11 NOTE — TELEPHONE ENCOUNTER
Duplicate PA encounter please see telephone encounter from [employer] on 5/8/24 regarding zolpidem PA. Please review patient's chart to see status of PA and to document anything regarding this medication in regards to anything regarding the authorization process etc before creating another encounter Thank You.

## 2024-05-13 ENCOUNTER — APPOINTMENT (OUTPATIENT)
Dept: PHYSICAL THERAPY | Facility: CLINIC | Age: 74
End: 2024-05-13
Payer: COMMERCIAL

## 2024-05-13 NOTE — TELEPHONE ENCOUNTER
PA for Zolpidem Denied    Reason:(Screenshot if applicable)        Message sent to office clinical pool Yes    Denial letter scanned into [employer] Yes    Appeal started No ( Provider will need to decide if appeal is warranted and send clinical documentation to PA team for initiation.)    **Please follow up with your patient regarding denial and next steps**

## 2024-05-15 ENCOUNTER — OFFICE VISIT (OUTPATIENT)
Dept: PHYSICAL THERAPY | Facility: CLINIC | Age: 74
End: 2024-05-15
Payer: COMMERCIAL

## 2024-05-15 DIAGNOSIS — R26.2 AMBULATORY DYSFUNCTION: ICD-10-CM

## 2024-05-15 DIAGNOSIS — M17.11 PRIMARY OSTEOARTHRITIS OF RIGHT KNEE: Primary | ICD-10-CM

## 2024-05-15 PROCEDURE — 97150 GROUP THERAPEUTIC PROCEDURES: CPT | Performed by: PHYSICAL THERAPIST

## 2024-05-15 PROCEDURE — 97112 NEUROMUSCULAR REEDUCATION: CPT | Performed by: PHYSICAL THERAPIST

## 2024-05-15 NOTE — PROGRESS NOTES
"Daily Note     Today's date: 5/15/2024  Patient name: Tian Garcia  : 1950  MRN: [id number]  Referring provider: Dot Galindo MD  Dx:   Encounter Diagnosis     ICD-10-CM    1. Primary osteoarthritis of right knee  M17.11       2. Ambulatory dysfunction  R26.2                      Subjective: Patient reports not feeling well today with low back pain and stiffness as well as having dizziness at time when first going to sit up or renzo dup from lying down or seated position.       Objective: See treatment diary below    Knee flex ROM: 125     Right araseli hallpike: positive upbeating torsional nystagmus    Assessment: Added ankle weights to straight leg raise with patient. Pt had positive testing for right posterior canalithiasis. Completed epley x 1. Due to lack of cervical spine ROM completed sidelying test with much more symptoms. Completed semont maneuver which pt was significantly dizzy through. Discussed the likelihood that he would have dizziness for hte next 24 hours and to take his time when changing positions. Plan next session to reassess. Will return focus to standing LE strengthening next session once BPPV is cleared.       Plan: Continue per plan of care.      Short Term Goal Expiration Date:(24)  Long Term Goal Expiration Date: (24)  POC Expiration Date: (24)      POC expires Unit limit Auth Expiration date PT/OT/ST + Visit Limit?   24 bomn 24 bomn     24                      Visit/Unit Tracking  AUTH Status:  Date 3/27 4/12 4/19 4/22 5/1 5/3 5/8 5/10 5/15     approved Used 1 3 4 7 9 10 11 12 1      Remaining     5 3 2 1 0 7           Precautions fall risk       Manuals 5/8 5/10 5/15 5/1 5/3                                   Neuro Re-Ed         Sit to stand     2x10   Step ups 8\" step 2x10 fwd   6\" step with 5# aw fwd/lat 10x ea with HR    Heel tap 4\" HHA R LE 10x 2 4\" hha R LE 10x2  4\" step L LE 10x with HHA 4\" step L LE 10x with HHA   Side stepping 3 laps pink TB " with UE   At HR pink TB 3 laps  3 laps with HHA                           Ther Ex        Ankle pumps        NUstep  5 min L5 5 min L7 6 min L5 6 min L7   Heel raise  Stretch - 30 sec x 2      Glut sets        Supine Heel slide        Supine Quad set        LAQ    5# aw R LE 2x10 5# 2x10    Supine hip abd Sidelying - 2x10    Clamshell - 2x10 Sidelying - 2x10    Clamshell - 2x10      SAQ        SLR   2# aw - 3x10  3x10   Ther Activity                        Gait Training        No AD SPC - sidewalk 100 ft    No AD - 50 ft               Modalities

## 2024-05-16 DIAGNOSIS — R80.8 OTHER PROTEINURIA: ICD-10-CM

## 2024-05-16 DIAGNOSIS — N18.32 STAGE 3B CHRONIC KIDNEY DISEASE (CKD) (HCC): Primary | ICD-10-CM

## 2024-05-16 DIAGNOSIS — N02.B9 IGA NEPHROPATHY DETERMINED BY BIOPSY OF KIDNEY: ICD-10-CM

## 2024-05-16 NOTE — TELEPHONE ENCOUNTER
JIHAN for patient to call the office to schedule a visit this month w/  Fly to go over his meds for insurance purposes.     Provided call back # [phone]

## 2024-05-17 ENCOUNTER — OFFICE VISIT (OUTPATIENT)
Dept: PHYSICAL THERAPY | Facility: CLINIC | Age: 74
End: 2024-05-17
Payer: COMMERCIAL

## 2024-05-17 DIAGNOSIS — M17.11 PRIMARY OSTEOARTHRITIS OF RIGHT KNEE: Primary | ICD-10-CM

## 2024-05-17 DIAGNOSIS — R26.2 AMBULATORY DYSFUNCTION: ICD-10-CM

## 2024-05-17 PROCEDURE — 97110 THERAPEUTIC EXERCISES: CPT

## 2024-05-17 PROCEDURE — 97112 NEUROMUSCULAR REEDUCATION: CPT

## 2024-05-17 NOTE — PROGRESS NOTES
Daily Note     Today's date: 2024  Patient name: Tian Garcia  : 1950  MRN: [id number]  Referring provider: Dot Galindo MD  Dx:   Encounter Diagnosis     ICD-10-CM    1. Primary osteoarthritis of right knee  M17.11       2. Ambulatory dysfunction  R26.2                      Subjective: Pt reports that he felt good the next 2 days after last session, but the dizziness came back yesterday and this morning from rolling in bed and standing and sitting down. Pt also reports that his knee is feeling much better and he is able to do things that he wasn't able to do before like walk without a cane and go around his house.       Objective: See treatment diary below      Assessment: Pt presented to today's session with continued reports of dizziness with quick position changes, noted most frequently when rolling in bed and getting up from a chair quickly. Reassessed for R posterior canalithiasis using sidelying test with continued noted upbeat torsional nystagmus. Performed Semont maneuver with noted reduction in dizziness symptoms. Following assessment and treatment for BPPV, continued with strengthening program for TKR, progressing weights for SLR and step height for heel taps. Pt will continue to benefit from skilled PT services to address residual limitations in strength, ROM, balance, and functional endurance related to TKR as well as to resolve symptoms associated with BPPV. Recommend reassessing BPPV symptoms at next session as pt reported improvement after performance of Semont maneuver today, but continued to feel mild dizziness at end of session.       Plan: Progress treatment as tolerated.       Short Term Goal Expiration Date:(24)  Long Term Goal Expiration Date: (24)  POC Expiration Date: (24)      POC expires Unit limit Auth Expiration date PT/OT/ST + Visit Limit?   24 bomn 24 bomn     24                      Visit/Unit Tracking  AUTH Status:  Date 3/27 4/12 4/19  "4/22 5/1 5/3 5/8 5/10 5/15 5/17    approved Used 1 3 4 7 9 10 11 12 1 2     Remaining     5 3 2 1 0 7 6          Precautions fall risk       Manuals 5/8 5/10 5/15 5/17 5/3                                   Neuro Re-Ed         Sit to stand     2x10   Step ups 8\" step 2x10 fwd   8\" step 2x10 fwd, #5 AW, fwd/lat    Heel tap 4\" HHA R LE 10x 2 4\" hha R LE 10x2  6\" hha R LE 2x10 4\" step L LE 10x with HHA   Side stepping 3 laps pink TB with UE    3 laps with HHA   Sidelying test    Performed SURAJ Adams    Performed JE            Ther Ex        Ankle pumps        NUstep  5 min L5 5 min L7  6 min L7   Heel raise  Stretch - 30 sec x 2      Glut sets        Supine Heel slide        Supine Quad set        LAQ     5# 2x10    Supine hip abd Sidelying - 2x10    Clamshell - 2x10 Sidelying - 2x10    Clamshell - 2x10      SAQ        SLR   2# aw - 3x10 3x10 2.5# AW 3x10   Ther Activity                        Gait Training        No AD SPC - sidewalk 100 ft    No AD - 50 ft               Modalities                                                              "

## 2024-05-21 ENCOUNTER — OFFICE VISIT (OUTPATIENT)
Dept: PHYSICAL THERAPY | Facility: CLINIC | Age: 74
End: 2024-05-21
Payer: COMMERCIAL

## 2024-05-21 DIAGNOSIS — M17.11 PRIMARY OSTEOARTHRITIS OF RIGHT KNEE: Primary | ICD-10-CM

## 2024-05-21 DIAGNOSIS — R26.2 AMBULATORY DYSFUNCTION: ICD-10-CM

## 2024-05-21 PROCEDURE — 97112 NEUROMUSCULAR REEDUCATION: CPT | Performed by: PHYSICAL THERAPIST

## 2024-05-21 PROCEDURE — 97110 THERAPEUTIC EXERCISES: CPT | Performed by: PHYSICAL THERAPIST

## 2024-05-21 NOTE — PROGRESS NOTES
"Daily Note     Today's date: 2024  Patient name: Tian Garcia  : 1950  MRN: [id number]  Referring provider: Dot Galindo MD  Dx:   Encounter Diagnosis     ICD-10-CM    1. Primary osteoarthritis of right knee  M17.11       2. Ambulatory dysfunction  R26.2                      Subjective: Patient reports feeling continued dizziness when changing positions. Other than when he is changing positions he is not dizzy.       Objective: See treatment diary below      Assessment: Completed sidelying into semont again x 2 this session. Continues to have positive sidelying test. Attempted elevated on 2 pillows epley attempting to get more extension. Will reassess next session. Completed step ups and hurdles today as well as treadmill within solo step to improve balance and strength independence. Pt had no instances of loss of balance into harness. Encouraged himt ot attempt to not maintain full knee extension but to flex slightly through activities which he was able to do with encouragement. Plan to attempt again at next session.       Plan: Continue per plan of care.      Short Term Goal Expiration Date:(24)  Long Term Goal Expiration Date: (24)  POC Expiration Date: (24)      POC expires Unit limit Auth Expiration date PT/OT/ST + Visit Limit?   24 bomn 24 bomn     24                      Visit/Unit Tracking  AUTH Status:  Date 3/27 4/12 4/19 4/22 5/ 5/3 5/8 5/10 5/15 5/17 5/21   approved Used 1 3 4 7 9 10 11 12 1 2 3    Remaining     5 3 2 1 0 7 6 5         Precautions fall risk       Manuals 5/8 5/10 5/15 5/17 5/21                                   Neuro Re-Ed         Sit to stand        Step ups 8\" step 2x10 fwd   8\" step 2x10 fwd, #5 AW, fwd/lat Up and over solo - 6\" with R LE lead 4 laps fwd 2 steps   Heel tap 4\" HHA R LE 10x 2 4\" hha R LE 10x2  6\" hha R LE 2x10    Side stepping 3 laps pink TB with UE       Sidelying test    Performed JE Completed x 2   Semont    Performed " "JE Completed x 2   hurdles     Fwd solo R LE lead 3 laps, L LE lead 3 laps 6\" hurdles    Ther Ex        treadmill     5% incline 7 minutes at 1.6 mphwith UE solo step   NUstep  5 min L5 5 min L7     Heel raise  Stretch - 30 sec x 2      Glut sets        Supine Heel slide        Supine Quad set        LAQ     10# 20x    Supine hip abd Sidelying - 2x10    Clamshell - 2x10 Sidelying - 2x10    Clamshell - 2x10      SAQ        SLR   2# aw - 3x10 3x10 2.5# AW 4x10 2.5# aw   Ther Activity                        Gait Training        No AD SPC - sidewalk 100 ft    No AD - 50 ft               Modalities                                                                "

## 2024-05-22 ENCOUNTER — TELEPHONE (OUTPATIENT)
Dept: NEPHROLOGY | Facility: CLINIC | Age: 74
End: 2024-05-22

## 2024-05-22 ENCOUNTER — APPOINTMENT (OUTPATIENT)
Dept: PHYSICAL THERAPY | Facility: CLINIC | Age: 74
End: 2024-05-22
Payer: COMMERCIAL

## 2024-05-22 LAB
ALBUMIN SERPL-MCNC: 4.2 G/DL (ref 3.8–4.8)
ALBUMIN/CREAT UR: 9 MG/G CREAT (ref 0–29)
ALBUMIN/GLOB SERPL: 1.6 {RATIO} (ref 1.2–2.2)
ALP SERPL-CCNC: 112 IU/L (ref 44–121)
ALT SERPL-CCNC: 13 IU/L (ref 0–44)
APPEARANCE UR: CLEAR
AST SERPL-CCNC: 21 IU/L (ref 0–40)
BACTERIA URNS QL MICRO: NORMAL
BILIRUB SERPL-MCNC: 0.9 MG/DL (ref 0–1.2)
BILIRUB UR QL STRIP: NEGATIVE
BUN SERPL-MCNC: 40 MG/DL (ref 8–27)
BUN/CREAT SERPL: 19 (ref 10–24)
CALCIUM SERPL-MCNC: 9.5 MG/DL (ref 8.6–10.2)
CASTS URNS QL MICRO: NORMAL /LPF
CHLORIDE SERPL-SCNC: 97 MMOL/L (ref 96–106)
CO2 SERPL-SCNC: 22 MMOL/L (ref 20–29)
COLOR UR: YELLOW
CREAT SERPL-MCNC: 2.15 MG/DL (ref 0.76–1.27)
CREAT UR-MCNC: 87.1 MG/DL
EGFR: 32 ML/MIN/1.73
EPI CELLS #/AREA URNS HPF: NORMAL /HPF (ref 0–10)
GLOBULIN SER-MCNC: 2.6 G/DL (ref 1.5–4.5)
GLUCOSE SERPL-MCNC: 120 MG/DL (ref 70–99)
GLUCOSE UR QL: NEGATIVE
HGB UR QL STRIP: NEGATIVE
KETONES UR QL STRIP: NEGATIVE
LEUKOCYTE ESTERASE UR QL STRIP: NEGATIVE
MICRO URNS: NORMAL
MICRO URNS: NORMAL
MICROALBUMIN UR-MCNC: 7.5 UG/ML
NITRITE UR QL STRIP: NEGATIVE
PH UR STRIP: 6 [PH] (ref 5–7.5)
POTASSIUM SERPL-SCNC: 4.1 MMOL/L (ref 3.5–5.2)
PROT SERPL-MCNC: 6.8 G/DL (ref 6–8.5)
PROT UR QL STRIP: NORMAL
PROT UR-MCNC: 8.6 MG/DL
PROT/CREAT UR: 99 MG/G CREAT (ref 0–200)
RBC #/AREA URNS HPF: NORMAL /HPF (ref 0–2)
SODIUM SERPL-SCNC: 137 MMOL/L (ref 134–144)
SP GR UR: 1.01 (ref 1–1.03)
UROBILINOGEN UR STRIP-ACNC: 0.2 MG/DL (ref 0.2–1)
WBC #/AREA URNS HPF: NORMAL /HPF (ref 0–5)

## 2024-05-22 NOTE — TELEPHONE ENCOUNTER
Called patient reminding to please complete labwork prior to 5/29 appointment with Dr. Lay. [facility] stating that [facility]    [facility] at [phone].

## 2024-05-23 ENCOUNTER — ANTICOAG VISIT (OUTPATIENT)
Dept: FAMILY MEDICINE CLINIC | Facility: HOSPITAL | Age: 74
End: 2024-05-23

## 2024-05-24 ENCOUNTER — OFFICE VISIT (OUTPATIENT)
Dept: PHYSICAL THERAPY | Facility: CLINIC | Age: 74
End: 2024-05-24
Payer: COMMERCIAL

## 2024-05-24 DIAGNOSIS — R26.2 AMBULATORY DYSFUNCTION: ICD-10-CM

## 2024-05-24 DIAGNOSIS — M17.11 PRIMARY OSTEOARTHRITIS OF RIGHT KNEE: Primary | ICD-10-CM

## 2024-05-24 PROCEDURE — 97110 THERAPEUTIC EXERCISES: CPT | Performed by: PHYSICAL THERAPIST

## 2024-05-24 PROCEDURE — 97112 NEUROMUSCULAR REEDUCATION: CPT | Performed by: PHYSICAL THERAPIST

## 2024-05-28 ENCOUNTER — APPOINTMENT (OUTPATIENT)
Dept: PHYSICAL THERAPY | Facility: CLINIC | Age: 74
End: 2024-05-28
Payer: COMMERCIAL

## 2024-05-29 ENCOUNTER — OFFICE VISIT (OUTPATIENT)
Dept: NEPHROLOGY | Facility: HOSPITAL | Age: 74
End: 2024-05-29
Payer: COMMERCIAL

## 2024-05-29 ENCOUNTER — OFFICE VISIT (OUTPATIENT)
Dept: PHYSICAL THERAPY | Facility: CLINIC | Age: 74
End: 2024-05-29
Payer: COMMERCIAL

## 2024-05-29 VITALS
BODY MASS INDEX: 33.18 KG/M2 | DIASTOLIC BLOOD PRESSURE: 60 MMHG | HEIGHT: 71 IN | SYSTOLIC BLOOD PRESSURE: 102 MMHG | WEIGHT: 237 LBS

## 2024-05-29 DIAGNOSIS — N18.32 STAGE 3B CHRONIC KIDNEY DISEASE (CKD) (HCC): Primary | ICD-10-CM

## 2024-05-29 DIAGNOSIS — N18.4 ANEMIA DUE TO STAGE 4 CHRONIC KIDNEY DISEASE  (HCC): ICD-10-CM

## 2024-05-29 DIAGNOSIS — R60.0 LOWER EXTREMITY EDEMA: ICD-10-CM

## 2024-05-29 DIAGNOSIS — N02.B9 IGA NEPHROPATHY DETERMINED BY BIOPSY OF KIDNEY: ICD-10-CM

## 2024-05-29 DIAGNOSIS — M17.11 PRIMARY OSTEOARTHRITIS OF RIGHT KNEE: Primary | ICD-10-CM

## 2024-05-29 DIAGNOSIS — R80.8 OTHER PROTEINURIA: ICD-10-CM

## 2024-05-29 DIAGNOSIS — I25.10 CORONARY ARTERY DISEASE INVOLVING NATIVE CORONARY ARTERY OF NATIVE HEART WITHOUT ANGINA PECTORIS: Chronic | ICD-10-CM

## 2024-05-29 DIAGNOSIS — D63.1 ANEMIA DUE TO STAGE 4 CHRONIC KIDNEY DISEASE  (HCC): ICD-10-CM

## 2024-05-29 DIAGNOSIS — R26.2 AMBULATORY DYSFUNCTION: ICD-10-CM

## 2024-05-29 DIAGNOSIS — R31.29 MICROSCOPIC HEMATURIA: ICD-10-CM

## 2024-05-29 PROCEDURE — 99214 OFFICE O/P EST MOD 30 MIN: CPT | Performed by: INTERNAL MEDICINE

## 2024-05-29 PROCEDURE — 97112 NEUROMUSCULAR REEDUCATION: CPT | Performed by: PHYSICAL THERAPIST

## 2024-05-29 PROCEDURE — G2211 COMPLEX E/M VISIT ADD ON: HCPCS | Performed by: INTERNAL MEDICINE

## 2024-05-29 PROCEDURE — 97110 THERAPEUTIC EXERCISES: CPT | Performed by: PHYSICAL THERAPIST

## 2024-05-29 NOTE — PATIENT INSTRUCTIONS
1. JANEL d/t IgAN, biopsy proven, in setting of recent MRSA infection. Suspect CKD stage 3b  -renal biopsy performed May 26, 2022 was limited as only 6 intact glomeruli were present for evaluation on light microscopy.  IgA dominant immune complex deposition noted by immunofluorescence.  Differential includes IgA nephropathy both primary and secondary forms versus infectious associated glomerulonephritis.  Staph aureus infections have been associated with IgA dominant immune complex deposition.  Patient did have MRSA surgical site infection so infection associated GN should be considered.  -prednisone 60 mg daily begun June 2nd, 2022. S/p prednisone taper, completed 9/1/22  -monitor CMP, CBC   -if this was a primary IgA nephropathy, it would be compatible with [place] classification of M0 E1 S0 T1-C1.  -of 38 glomeruli, 6 were intact, forehead global glomerulosclerosis, segmental sclerosis was absent.  Moderate interstitial fibrosis and tubular atrophy as well as arterial intimal fibrosis and mild arterolar hyalinosis were noted  -did not require dialysis inpatient, permacath removed prior to discharge  -sCr 2.15 as of 5/21/24, slowly improving from 5.34 and seems to have stabilized.   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 Thankfully, sCr stable and proteinuria continues to improve  -recommend CKD education - referral placed last visit. Call her when you are ready to do this.     2. Hypophosphatemia - phos 3.4, in setting of JANEL. Resolved.     3. Proteinuria - UpCr in setting of IgAN shows UpCr improved from 18.8g to 1.5g as of 8/15/22, with latest  UpCr 99mg/g as of 5/21/24 and microalb:Cr 9mg/g(improved)  -consider ACEi initiation if UpCr rises above 1g      4. HTN - BP low normal for age/CKD in office. Continue metoprolol 25mg twice daily, flomax, torsemide 20mg daily but may take 30mg tablet when increased edema noted     5. Anemia ? D/t underlying GN/CKD - Hgb 10.1 as of April 2024, repeat CBC post op     6. LE edema likely d/t nephrotic syndrome as well as history of chronic diastolic CHF (grade 2 diastolic dysfunction noted on April 2022 ech)  - continue torsemide as above, will monitor K/SCr. Monitor daily weight/BP readings.    7. Urinary frequency/urgency - on flomax, check renal u/s with PVR     Repeat CMP, UA, UpCr and microalb:Cr and CBC in Aug. 2024   RTC in 4 months.

## 2024-05-29 NOTE — PROGRESS NOTES
NEPHROLOGY OUTPATIENT PROGRESS NOTE   Tian Garcia 74 y.o. male MRN: [id number]  DATE: 5/29/2024  Reason for visit:   Chief Complaint   Patient presents with    Follow-up    Chronic Kidney Disease        Patient Instructions   1. JANEL d/t IgAN, biopsy proven, in setting of recent MRSA infection. Suspect CKD stage 3b  -renal biopsy performed May 26, 2022 was limited as only 6 intact glomeruli were present for evaluation on light microscopy.  IgA dominant immune complex deposition noted by immunofluorescence.  Differential includes IgA nephropathy both primary and secondary forms versus infectious associated glomerulonephritis.  Staph aureus infections have been associated with IgA dominant immune complex deposition.  Patient did have MRSA surgical site infection so infection associated GN should be considered.  -prednisone 60 mg daily begun June 2nd, 2022. S/p prednisone taper, completed 9/1/22  -monitor CMP, CBC   -if this was a primary IgA nephropathy, it would be compatible with [place] classification of M0 E1 S0 T1-C1.  -of 38 glomeruli, 6 were intact, forehead global glomerulosclerosis, segmental sclerosis was absent.  Moderate interstitial fibrosis and tubular atrophy as well as arterial intimal fibrosis and mild arterolar hyalinosis were noted  -did not require dialysis inpatient, permacath removed prior to discharge  -sCr 2.15 as of 5/21/24, slowly improving from 5.34 and seems to have stabilized.   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 Thankfully, sCr stable and proteinuria continues to improve  -recommend CKD education - referral placed  last visit. Call her when you are ready to do this.     2. Hypophosphatemia - phos 3.4, in setting of JANEL. Resolved.     3. Proteinuria - UpCr in setting of IgAN shows UpCr improved from 18.8g to 1.5g as of 8/15/22, with latest UpCr 99mg/g as of 5/21/24 and microalb:Cr 9mg/g(improved)  -consider ACEi initiation if UpCr rises above 1g      4. HTN - BP low normal for age/CKD in office. Continue metoprolol 25mg twice daily, flomax, torsemide 20mg daily but may take 30mg tablet when increased edema noted     5. Anemia ? D/t underlying GN/CKD - Hgb 10.1 as of April 2024, repeat CBC post op     6. LE edema likely d/t nephrotic syndrome as well as history of chronic diastolic CHF (grade 2 diastolic dysfunction noted on April 2022 ech)  - continue torsemide as above, will monitor K/SCr. Monitor daily weight/BP readings.    7. Urinary frequency/urgency - on flomax, check renal u/s with PVR     Repeat CMP, UA, UpCr and microalb:Cr and CBC in Aug. 2024   RTC in 4 months.      Tian was seen today for follow-up and chronic kidney disease.    Diagnoses and all orders for this visit:    Stage 3b chronic kidney disease (CKD) (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 JANEL d/t IgAN, biopsy proven, in setting of recent MRSA infection. Suspect CKD stage 3b  -renal biopsy performed May 26, 2022 was limited as only 6 intact glomeruli were present for evaluation on light microscopy.  IgA dominant immune complex deposition noted by immunofluorescence.  Differential includes IgA nephropathy both primary and secondary forms versus infectious associated glomerulonephritis.  Staph aureus infections have been associated with IgA dominant immune complex deposition.  Patient did have MRSA surgical site infection so infection associated GN should be considered.  -prednisone 60 mg daily begun June 2nd, 2022. S/p prednisone taper, completed 9/1/22  -monitor CMP, CBC   -if this was a primary IgA nephropathy, it would be compatible with [place] classification of M0 E1 S0 T1-C1.  -of 38 glomeruli, 6 were intact, forehead global glomerulosclerosis, segmental sclerosis was absent.  Moderate interstitial fibrosis and tubular atrophy as well as arterial intimal fibrosis and mild arterolar hyalinosis were noted  -did not require dialysis inpatient, permacath removed prior to discharge  -sCr 2.15 as of 5/21/24, slowly improving from 5.34 and seems to have stabilized.   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 Thankfully, sCr stable and proteinuria continues to improve  -recommend CKD education - referral placed last visit. Call her when you are ready to do this.     2. Hypophosphatemia - phos 3.4, in  setting of JANEL. Resolved.     3. Proteinuria - UpCr in setting of IgAN shows UpCr improved from 18.8g to 1.5g as of 8/15/22, with latest UpCr 99mg/g as of 5/21/24 and microalb:Cr 9mg/g(improved)  -consider ACEi initiation if UpCr rises above 1g      4. HTN - BP low normal for age/CKD in office. Continue metoprolol 25mg twice daily, flomax, torsemide 20mg daily but may take 30mg tablet when increased edema noted     5. Anemia ? D/t underlying GN/CKD - Hgb 10.1 as of April 2024, repeat CBC post op     6. LE edema likely d/t nephrotic syndrome as well as history of chronic diastolic CHF (grade 2 diastolic dysfunction noted on April 2022 ech)  - continue torsemide as above, will monitor K/SCr. Monitor daily weight/BP readings.    7. Urinary frequency/urgency - on flomax, check renal u/s with PVR     Repeat CMP, UA, UpCr and microalb:Cr and CBC in Aug. 2024   RTC in 4 months.    SUBJECTIVE / INTERVAL HISTORY:  74 y.o. male presents in follow up of CKD.     Tian Garcia admitted in April 2024 for right knee replacement.    Denies NSAID use. Uses tylenol.  HTN - BP at home has bene ok  Denies LE edema. Has been doing mostly torsemide 20mg daily.  Her wife had double lung transplant.  Does have trouble urinating since the surgery. Did have catheter in at the time of surgery.     Review of Systems   Constitutional:  Negative for chills and fever.   HENT:  Negative for sore throat.    Eyes:  Negative for visual disturbance.   Respiratory:  Negative for cough and shortness of breath.    Cardiovascular:  Negative for chest pain and leg swelling.   Gastrointestinal:  Negative for abdominal pain, constipation, diarrhea, nausea and vomiting.   Endocrine: Negative for polyuria.   Genitourinary:  Positive for frequency and urgency. Negative for decreased urine volume, difficulty urinating, dysuria and hematuria.   Musculoskeletal:  Positive for back pain (goes to PT). Negative for myalgias.   Skin:  Negative for rash.  "  Neurological:  Positive for dizziness (from BPPV) and numbness (outside of knee after surgery on right). Negative for light-headedness.   Psychiatric/Behavioral:  Negative for confusion.        OBJECTIVE:  /60 (BP Location: Left arm, Patient Position: Sitting, Cuff Size: Large)   Ht 5' 11\" (1.803 m)   Wt 108 kg (237 lb)   BMI 33.05 kg/m²  Body mass index is 33.05 kg/m².    Physical exam:  Physical Exam  Vitals reviewed.   Constitutional:       General: He is not in acute distress.     Appearance: Normal appearance. He is well-developed. He is not diaphoretic.   HENT:      Head: Normocephalic and atraumatic.      Nose: Nose normal.      Mouth/Throat:      Mouth: Mucous membranes are moist.      Pharynx: No oropharyngeal exudate.   Eyes:      General: No scleral icterus.        Right eye: No discharge.         Left eye: No discharge.   Neck:      Thyroid: No thyromegaly.   Cardiovascular:      Rate and Rhythm: Normal rate and regular rhythm.      Heart sounds: Murmur heard.   Pulmonary:      Effort: Pulmonary effort is normal.      Breath sounds: Normal breath sounds. No wheezing or rales.   Abdominal:      General: Bowel sounds are normal. There is no distension.      Palpations: Abdomen is soft.      Tenderness: There is no abdominal tenderness.   Musculoskeletal:         General: Swelling (trace b/l LE) present. Normal range of motion.      Cervical back: Neck supple.   Lymphadenopathy:      Cervical: No cervical adenopathy.   Skin:     General: Skin is warm and dry.      Coloration: Skin is not jaundiced.      Findings: No rash.      Comments: Right knee incision c/d/i   Neurological:      General: No focal deficit present.      Mental Status: He is alert.      Comments: awake   Psychiatric:         Mood and Affect: Mood normal.         Behavior: Behavior normal.         Medications:    Current Outpatient Medications:     Aspirin 81 MG CAPS, Take by mouth, Disp: , Rfl:     atorvastatin (LIPITOR) 80 mg " tablet, Take 1 tablet (80 mg total) by mouth every evening, Disp: 90 tablet, Rfl: 0    DULoxetine (CYMBALTA) 60 mg delayed release capsule, TAKE 1 CAPSULE BY MOUTH EVERY DAY, Disp: 90 capsule, Rfl: 1    ezetimibe (ZETIA) 10 mg tablet, TAKE 1 TABLET BY MOUTH EVERY DAY, Disp: 90 tablet, Rfl: 3    gabapentin (NEURONTIN) 300 mg capsule, TAKE 1 CAPSULE BY MOUTH DAILY AT BEDTIME, Disp: 90 capsule, Rfl: 1    glucose blood (Accu-Chek Guide) test strip, Use one new strip daily dx r73.01, Disp: 100 strip, Rfl: 1    metoprolol succinate (TOPROL-XL) 25 mg 24 hr tablet, TAKE 1 TABLET BY MOUTH TWICE A DAY, Disp: 180 tablet, Rfl: 1    mometasone (ELOCON) 0.1 % cream, APPLY TO AFFECTED AREA TOPICALLY EVERY DAY, Disp: 45 g, Rfl: 1    nitroglycerin (NITROSTAT) 0.4 mg SL tablet, Place 1 tablet (0.4 mg total) under the tongue every 5 (five) minutes as needed for chest pain, Disp: 30 tablet, Rfl: 0    oxyCODONE (ROXICODONE) 5 immediate release tablet, Take 1 tablets every 6 hrs as needed for pain control, Disp: 10 tablet, Rfl: 0    potassium chloride (MICRO-K) 10 MEQ CR capsule, TAKE 1 CAPSULE BY MOUTH TWICE A DAY, Disp: 180 capsule, Rfl: 1    tamsulosin (FLOMAX) 0.4 mg, TAKE 1 CAPSULE BY MOUTH DAILY  WITH DINNER, Disp: 90 capsule, Rfl: 1    tirzepatide (Mounjaro) 2.5 MG/0.5ML, INJECT 0.5 ML (2.5 MG TOTAL) UNDER THE SKIN EVERY 7 DAYS, Disp: 2 mL, Rfl: 1    torsemide (DEMADEX) 20 mg tablet, TAKE 30 MG ALTERNATE WITH 20 MG DAILY, Disp: 135 tablet, Rfl: 0    warfarin (COUMADIN) 5 mg tablet, TAKE ONE-HALF TABLET BY MOUTH  DAILY , EXCEPT TAKE 1 TABLET BY  MOUTH ON WEDNESDAY AND SATURDAY, Disp: 58 tablet, Rfl: 3    zolpidem (AMBIEN) 10 mg tablet, Take 1 tablet (10 mg total) by mouth daily at bedtime as needed for sleep, Disp: 90 tablet, Rfl: 0    docusate sodium (COLACE) 100 mg capsule, Take 1 capsule (100 mg total) by mouth 2 (two) times a day (Patient not taking: Reported on 4/23/2024), Disp: 10 capsule, Rfl: 0    enoxaparin (LOVENOX) 100  "mg/mL, Inject 1 mL (100 mg total) under the skin every 24 hours for 7 days Start Lovenox injection daily on 4/5/2024 and continue through and including morning 4/7/2024 then stop and then Start Lovenox injection daily on 4/9/2024 and continue through 4/12/2024 or until PT/INR ( coumadin level) is 2.0 or greater as proven by post surgery lab test with monitoring by your PCP or surgical team Do not start before April 5, 2024. (Patient not taking: Reported on 5/29/2024), Disp: 7 mL, Rfl: 0    folic acid (FOLVITE) 1 mg tablet, TAKE 1 TABLET BY MOUTH DAILY (Patient not taking: Reported on 5/29/2024), Disp: 30 tablet, Rfl: 0    ondansetron (ZOFRAN) 4 mg tablet, Take 1 tablet (4 mg total) by mouth every 8 (eight) hours as needed for nausea or vomiting (Patient not taking: Reported on 4/23/2024), Disp: 5 tablet, Rfl: 0    Allergies:  Allergies as of 05/29/2024 - Reviewed 05/29/2024   Allergen Reaction Noted    Morphine GI Intolerance 09/11/2018    Vitamin k Other (See Comments) 12/28/2020       The following portions of the patient's history were reviewed and updated as appropriate: past family history, past surgical history and problem list.    Laboratory Results:  Lab Results   Component Value Date    SODIUM 137 05/21/2024    K 4.1 05/21/2024    CL 97 05/21/2024    CO2 22 05/21/2024    BUN 40 (H) 05/21/2024    CREATININE 2.15 (H) 05/21/2024    GLUC 120 (H) 05/21/2024    CALCIUM 8.8 04/09/2024        Lab Results   Component Value Date    CALCIUM 8.8 04/09/2024    PHOS 3.6 05/22/2023       Portions of the record may have been created with voice recognition software.  Occasional wrong word or \"sound a like\" substitutions may have occurred due to the inherent limitations of voice recognition software.  Read the chart carefully and recognize, using context, where substitutions have occurred.  "

## 2024-05-29 NOTE — PROGRESS NOTES
"Daily Note     Today's date: 2024  Patient name: Tian Garcia  : 1950  MRN: [id number]  Referring provider: Dot Galindo MD  Dx: No diagnosis found.               Subjective: Patient reports feeling tired today, did a lot of outdoor yardwork.       Objective: See treatment diary below      Assessment: Patient has more difficulty and pain with lunges with L Le versus R LE. Plan to issue for Hep after next session.       Plan: Continue per plan of care.      Short Term Goal Expiration Date:(24)  Long Term Goal Expiration Date: (24)  POC Expiration Date: (24)      POC expires Unit limit Auth Expiration date PT/OT/ST + Visit Limit?   24 bomn 24 bomn     24                      Visit/Unit Tracking  AUTH Status:  Date 5/24 5/29   5/1 5/3 5/8 5/10 5/15 5/17 5/21 5/24   approved Used 4 5   9 10 11 12 1 2 3 4    Remaining  4 3   3 2 1 0 7 6 5 4         Precautions fall risk       Manuals                                    Neuro Re-Ed         Sit to stand  3x10 with 10# bolster hold      Step ups 8\" step x25 fwd    Lateral with 8\" x10 each side   8\" step 2x10 fwd, #5 AW, fwd/lat Up and over solo - 6\" with R LE lead 4 laps fwd 2 steps   Heel tap    6\" hha R LE 2x10    Side stepping        Sidelying test    Performed JE Completed x 2   Semont Performed AB   Performed JE Completed x 2   Hurdles 8\" hurdles (6) fwd, alternating which foot leads, 3 laps  8\" hurdles (6) fwd, alternating which foot leads, 3 laps    Fwd solo R LE lead 3 laps, L LE lead 3 laps 6\" hurdles    Cone taps  3 laps of walking and tapping the cone with each step       Ther Ex        treadmill  5% incline for 3 min, no incline for 5 min - 1.6 mph with b/l UE   5% incline 7 minutes at 1.6 mphwith UE solo step   NUstep 10 mins on L5        Heel raise        Glut sets        Supine Heel slide        Supine Quad set        LAQ  7.5# aw - 2x10    10# 20x    Supine hip abd        SAQ        SLR    " 3x10 2.5# AW 4x10 2.5# aw   Ther Activity                        Gait Training        No AD                Modalities

## 2024-05-30 RX ORDER — ATORVASTATIN CALCIUM 80 MG/1
80 TABLET, FILM COATED ORAL EVERY EVENING
Qty: 90 TABLET | Refills: 1 | Status: SHIPPED | OUTPATIENT
Start: 2024-05-30

## 2024-05-31 ENCOUNTER — OFFICE VISIT (OUTPATIENT)
Dept: PHYSICAL THERAPY | Facility: CLINIC | Age: 74
End: 2024-05-31
Payer: COMMERCIAL

## 2024-05-31 DIAGNOSIS — R26.2 AMBULATORY DYSFUNCTION: ICD-10-CM

## 2024-05-31 DIAGNOSIS — M17.11 PRIMARY OSTEOARTHRITIS OF RIGHT KNEE: Primary | ICD-10-CM

## 2024-05-31 PROCEDURE — 97112 NEUROMUSCULAR REEDUCATION: CPT | Performed by: PHYSICAL THERAPIST

## 2024-05-31 PROCEDURE — 97110 THERAPEUTIC EXERCISES: CPT | Performed by: PHYSICAL THERAPIST

## 2024-05-31 NOTE — PROGRESS NOTES
"Daily Note     Today's date: 2024  Patient name: Tian Garcia  : 1950  MRN: [id number]  Referring provider: Dot Galindo MD  Dx:   Encounter Diagnosis     ICD-10-CM    1. Primary osteoarthritis of right knee  M17.11       2. Ambulatory dysfunction  R26.2                      Subjective: Patient reports to physical therapy this date with no new changes since last session      Objective: See treatment diary below      Assessment: Patient attempted heel dips with very poor success due to poor hip and knee strength control. Patient is able to walk consistently with SPC with fair balance.     Plan: Continue per plan of care.      Short Term Goal Expiration Date:(24)  Long Term Goal Expiration Date: (24)  POC Expiration Date: (24)      POC expires Unit limit Auth Expiration date PT/OT/ST + Visit Limit?   24 bomn 24 bomn     24                      Visit/Unit Tracking  AUTH Status:  Date 5/24 5/29 5/31  5/1 5/3 5/8 5/10 5/15 5/17 5/21 5/24   approved Used 4 5   9 10 11 12 1 2 3 4    Remaining  4 3   3 2 1 0 7 6 5 4         Precautions fall risk       Manuals                                    Neuro Re-Ed         Sit to stand  3x10 with 10# bolster hold 3x10 with 10# bolster hold     Step ups 8\" step x25 fwd    Lateral with 8\" x10 each side   8\" step 2x10 fwd, #5 AW, fwd/lat Up and over solo - 6\" with R LE lead 4 laps fwd 2 steps   Heel tap   4\" step 3x10 6\" hha R LE 2x10    Side stepping        Sidelying test    Performed JE Completed x 2   Semont Performed AB   Performed JE Completed x 2   Hurdles 8\" hurdles (6) fwd, alternating which foot leads, 3 laps  8\" hurdles (6) fwd, alternating which foot leads, 3 laps    Fwd solo R LE lead 3 laps, L LE lead 3 laps 6\" hurdles    Cone taps  3 laps of walking and tapping the cone with each step       Ther Ex        treadmill  5% incline for 3 min, no incline for 5 min - 1.6 mph with b/l UE 5% incline for 8 min, " 1.6 mph with b/l UE  5% incline 7 minutes at 1.6 mphwith UE solo step   NUstep 10 mins on L5        Heel raise        Glut sets        Supine Heel slide        Supine Quad set        LAQ  7.5# aw - 2x10  10# 3x10   10# 20x    Supine hip abd        SAQ        SLR   3x10 no aw - focus on full extension 3x10 2.5# AW 4x10 2.5# aw   Ther Activity                        Gait Training        No AD                Modalities

## 2024-06-04 ENCOUNTER — OFFICE VISIT (OUTPATIENT)
Dept: PHYSICAL THERAPY | Facility: CLINIC | Age: 74
End: 2024-06-04
Payer: COMMERCIAL

## 2024-06-04 DIAGNOSIS — R51.9 INTRACTABLE HEADACHE, UNSPECIFIED CHRONICITY PATTERN, UNSPECIFIED HEADACHE TYPE: ICD-10-CM

## 2024-06-04 DIAGNOSIS — N13.8 BPH WITH OBSTRUCTION/LOWER URINARY TRACT SYMPTOMS: ICD-10-CM

## 2024-06-04 DIAGNOSIS — N40.1 BPH WITH OBSTRUCTION/LOWER URINARY TRACT SYMPTOMS: ICD-10-CM

## 2024-06-04 DIAGNOSIS — R26.2 AMBULATORY DYSFUNCTION: ICD-10-CM

## 2024-06-04 DIAGNOSIS — M17.11 PRIMARY OSTEOARTHRITIS OF RIGHT KNEE: Primary | ICD-10-CM

## 2024-06-04 PROCEDURE — 97110 THERAPEUTIC EXERCISES: CPT | Performed by: PHYSICAL THERAPIST

## 2024-06-04 PROCEDURE — 97112 NEUROMUSCULAR REEDUCATION: CPT | Performed by: PHYSICAL THERAPIST

## 2024-06-04 RX ORDER — GABAPENTIN 300 MG/1
CAPSULE ORAL
Qty: 90 CAPSULE | Refills: 1 | Status: SHIPPED | OUTPATIENT
Start: 2024-06-04

## 2024-06-04 RX ORDER — TAMSULOSIN HYDROCHLORIDE 0.4 MG/1
CAPSULE ORAL
Qty: 90 CAPSULE | Refills: 1 | Status: SHIPPED | OUTPATIENT
Start: 2024-06-04

## 2024-06-04 NOTE — PROGRESS NOTES
"Daily Note     Today's date: 2024  Patient name: Tian Garcia  : 1950  MRN: [id number]  Referring provider: Dot Galindo MD  Dx:   Encounter Diagnosis     ICD-10-CM    1. Primary osteoarthritis of right knee  M17.11       2. Ambulatory dysfunction  R26.2                      Subjective: Patient reports feeling well today, no new changes over the weekend      Objective: See treatment diary below      Assessment: Focused the session on bulding strength in the last 20-30 degrees of knee extension in order to better support staying in slight flexion rather than hyper extending in stance phase. Some improvements noted as session progressed with better control and coordination with practice.       Plan: Continue per plan of care.      Short Term Goal Expiration Date:(24)  Long Term Goal Expiration Date: (24)  POC Expiration Date: (24)      POC expires Unit limit Auth Expiration date PT/OT/ST + Visit Limit?   24 bomn 24 bomn     24                      Visit/Unit Tracking  AUTH Status:  Date 5/24 5/29 5/31 6/4 5/1 5/3 5/8 5/10 5/15 5/17 5/21 5/24   approved Used 4 5 6 7 9 10 11 12 1 2 3 4    Remaining  4 3   3 2 1 0 7 6 5 4         Precautions fall risk       Manuals  6                                    Neuro Re-Ed         Sit to stand  3x10 with 10# bolster hold 3x10 with 10# bolster hold     Step ups 8\" step x25 fwd    Lateral with 8\" x10 each side   Up and overs solo step 4\" and 2 6\" steps with R LE lead ascending, L LE lead descending    Heel tap   4\" step 3x10     TKE    Purple TB - 2x10 R LE    Sidelying test        Semont Performed AB       Hurdles 8\" hurdles (6) fwd, alternating which foot leads, 3 laps  8\" hurdles (6) fwd, alternating which foot leads, 3 laps   8\" hurdles (6) fwd, alternating which foot leads, 3 laps     Cone taps  3 laps of walking and tapping the cone with each step       Ther Ex        treadmill  5% incline for 3 min, no incline " for 5 min - 1.6 mph with b/l UE 5% incline for 8 min, 1.6 mph with b/l UE 5% incline for 8 min, 1.6 mph with b/l UE    NUstep 10 mins on L5        Heel raise        Glut sets        Supine Heel slide        Supine Quad set        LAQ  7.5# aw - 2x10  10# 3x10      Supine hip abd        SAQ        SLR   3x10 no aw - focus on full extension     Ther Activity                        Gait Training        No AD                Modalities

## 2024-06-07 ENCOUNTER — OFFICE VISIT (OUTPATIENT)
Dept: PHYSICAL THERAPY | Facility: CLINIC | Age: 74
End: 2024-06-07
Payer: COMMERCIAL

## 2024-06-07 DIAGNOSIS — M17.11 PRIMARY OSTEOARTHRITIS OF RIGHT KNEE: Primary | ICD-10-CM

## 2024-06-07 DIAGNOSIS — R26.2 AMBULATORY DYSFUNCTION: ICD-10-CM

## 2024-06-07 PROCEDURE — 97116 GAIT TRAINING THERAPY: CPT | Performed by: PHYSICAL THERAPIST

## 2024-06-07 PROCEDURE — 97110 THERAPEUTIC EXERCISES: CPT | Performed by: PHYSICAL THERAPIST

## 2024-06-07 NOTE — PROGRESS NOTES
"Daily Note     Today's date: 2024  Patient name: Tian Garcia  : 1950  MRN: [id number]  Referring provider: Dot Galindo MD  Dx:   Encounter Diagnosis     ICD-10-CM    1. Primary osteoarthritis of right knee  M17.11       2. Ambulatory dysfunction  R26.2                      Subjective: Patient reprots no new changes since last session.       Objective: See treatment diary below      Assessment: Had patient consciously attempt to maintain some flexion in right knee through stance phase. Had to slow down and struggled more but balance overall was must better.       Plan: Continue per plan of care.      Short Term Goal Expiration Date:(24)  Long Term Goal Expiration Date: (24)  POC Expiration Date: (24)      POC expires Unit limit Auth Expiration date PT/OT/ST + Visit Limit?   24 bomn 24 bomn     24                      Visit/Unit Tracking  AUTH Status:  Date 5/24 5/29 5/31 6/4 5/1 5/3 5/8 5/10 5/15 5/17 5/21 5/24   approved Used 4 5 6 7 9 10 11 12 1 2 3 4    Remaining  4 3   3 2 1 0 7 6 5 4         Precautions fall risk       Manuals                                    Neuro Re-Ed         Sit to stand  3x10 with 10# bolster hold 3x10 with 10# bolster hold     Step ups 8\" step x25 fwd    Lateral with 8\" x10 each side   Up and overs solo step 4\" and 2 6\" steps with R LE lead ascending, L LE lead descending    Heel tap   4\" step 3x10  4\" 3x10    TKE    Purple TB - 2x10 R LE Blue TB - 3x10 R LE   Sidelying test        Semont Performed AB       Hurdles 8\" hurdles (6) fwd, alternating which foot leads, 3 laps  8\" hurdles (6) fwd, alternating which foot leads, 3 laps   8\" hurdles (6) fwd, alternating which foot leads, 3 laps     Cone taps  3 laps of walking and tapping the cone with each step       Ther Ex        treadmill  5% incline for 3 min, no incline for 5 min - 1.6 mph with b/l UE 5% incline for 8 min, 1.6 mph with b/l UE 5% incline for 8 min, 1.6 " mph with b/l UE    NUstep 10 mins on L5        Heel raise     30x with b/l LE   Glut sets        Supine Heel slide        Supine Quad set        LAQ  7.5# aw - 2x10  10# 3x10      Supine hip abd     Sidelying - 3x10   SAQ        SLR   3x10 no aw - focus on full extension  2.5# 3x10   Ther Activity                        Gait Training        No AD        outdoors     Up and down sidewalk incline with SPC    Modalities

## 2024-06-09 ENCOUNTER — HOSPITAL ENCOUNTER (OUTPATIENT)
Dept: ULTRASOUND IMAGING | Facility: HOSPITAL | Age: 74
Discharge: HOME | End: 2024-06-09
Attending: INTERNAL MEDICINE
Payer: COMMERCIAL

## 2024-06-09 DIAGNOSIS — N18.32 STAGE 3B CHRONIC KIDNEY DISEASE (CKD) (HCC): ICD-10-CM

## 2024-06-09 PROCEDURE — 76770 US EXAM ABDO BACK WALL COMP: CPT

## 2024-06-10 ENCOUNTER — APPOINTMENT (OUTPATIENT)
Dept: PHYSICAL THERAPY | Facility: CLINIC | Age: 74
End: 2024-06-10
Payer: COMMERCIAL

## 2024-06-11 ENCOUNTER — OFFICE VISIT (OUTPATIENT)
Dept: PHYSICAL THERAPY | Facility: CLINIC | Age: 74
End: 2024-06-11
Payer: COMMERCIAL

## 2024-06-11 DIAGNOSIS — R26.2 AMBULATORY DYSFUNCTION: ICD-10-CM

## 2024-06-11 DIAGNOSIS — M17.11 PRIMARY OSTEOARTHRITIS OF RIGHT KNEE: Primary | ICD-10-CM

## 2024-06-11 PROCEDURE — 97112 NEUROMUSCULAR REEDUCATION: CPT | Performed by: PHYSICAL THERAPIST

## 2024-06-11 PROCEDURE — 97110 THERAPEUTIC EXERCISES: CPT | Performed by: PHYSICAL THERAPIST

## 2024-06-11 NOTE — PROGRESS NOTES
"Daily Note     Today's date: 2024  Patient name: Tian Garcia  : 1950  MRN: [id number]  Referring provider: Dot Galindo MD  Dx:   Encounter Diagnosis     ICD-10-CM    1. Primary osteoarthritis of right knee  M17.11       2. Ambulatory dysfunction  R26.2                      Subjective: Patient reports to physical therapy this date with no new changes      Objective: See treatment diary below      Assessment: Patient was overall fatigued this morning. Patient continues to have hte greatest strength deficits with knee strength in last 20 degrees of motion.       Plan: Continue per plan of care.      Short Term Goal Expiration Date:(24)  Long Term Goal Expiration Date: (24)  POC Expiration Date: (24)      POC expires Unit limit Auth Expiration date PT/OT/ST + Visit Limit?   24 bomn 24 bomn     24                      Visit/Unit Tracking  AUTH Status:  Date 5/24 5/29 5/31 6/4 5/1 5/3 5/8 5/10 5/15 5/17 5/21 5/24   approved Used 4 5 6 7 9 10 11 12 1 2 3 4    Remaining  4 3   3 2 1 0 7 6 5 4         Precautions fall risk       Manuals                                    Neuro Re-Ed         Sit to stand 3x10 with 10# bolster 3x10 with 10# bolster hold 3x10 with 10# bolster hold     Step ups    Up and overs solo step 4\" and 2 6\" steps with R LE lead ascending, L LE lead descending    Heel tap   4\" step 3x10  4\" 3x10    TKE Purple TB 3x10   Purple TB - 2x10 R LE Blue TB - 3x10 R LE   Sidelying test        Semont        Hurdles 6\" hurdles fwd focus on maintain some flexion - 4 laps solo step  8\" hurdles (6) fwd, alternating which foot leads, 3 laps   8\" hurdles (6) fwd, alternating which foot leads, 3 laps     Cone taps         Ther Ex        treadmill 5% cinline for 5 min 1.6 mph b/l UE 5% incline for 3 min, no incline for 5 min - 1.6 mph with b/l UE 5% incline for 8 min, 1.6 mph with b/l UE 5% incline for 8 min, 1.6 mph with b/l UE    NUstep        Heel " raise     30x with b/l LE   Glut sets        Supine Heel slide        Supine Quad set        LAQ  7.5# aw - 2x10  10# 3x10      Supine hip abd     Sidelying - 3x10   SAQ        SLR 2x10  3x10 no aw - focus on full extension  2.5# 3x10   Ther Activity                        Gait Training        No AD        outdoors     Up and down sidewalk incline with SPC    Modalities

## 2024-06-14 ENCOUNTER — OFFICE VISIT (OUTPATIENT)
Dept: PHYSICAL THERAPY | Facility: CLINIC | Age: 74
End: 2024-06-14
Payer: COMMERCIAL

## 2024-06-14 DIAGNOSIS — M17.11 PRIMARY OSTEOARTHRITIS OF RIGHT KNEE: Primary | ICD-10-CM

## 2024-06-14 DIAGNOSIS — R26.2 AMBULATORY DYSFUNCTION: ICD-10-CM

## 2024-06-14 PROCEDURE — 97110 THERAPEUTIC EXERCISES: CPT

## 2024-06-14 PROCEDURE — 97112 NEUROMUSCULAR REEDUCATION: CPT

## 2024-06-14 NOTE — PROGRESS NOTES
"Daily Note     Today's date: 2024  Patient name: Tian Garcia  : 1950  MRN: [id number]  Referring provider: Dot Galindo MD  Dx:   Encounter Diagnosis     ICD-10-CM    1. Primary osteoarthritis of right knee  M17.11       2. Ambulatory dysfunction  R26.2                      Subjective: Pt approx. 15 min late for session. Was out working in his garden and misjudged amount of time task and then drive would take. Very apologetic for late arrival.       Objective: See treatment diary below      Assessment: Tolerated treatment well despite reports of increased fatigue after working in garden spreading mulch for 3 hours. Session abbreviated due to late arrival. Continued w/ TKE as pt has greatest limitations in strength w/ last 10-20 degrees of motion. Patient demonstrated fatigue post treatment, exhibited good technique with therapeutic exercises, and would benefit from continued PT      Plan: Continue per plan of care.  Progress treatment as tolerated.       Short Term Goal Expiration Date:(24)  Long Term Goal Expiration Date: (24)  POC Expiration Date: (24)      POC expires Unit limit Auth Expiration date PT/OT/ST + Visit Limit?   24 bomn 24 bomn     24                      Visit/Unit Tracking  AUTH Status:  Date 5/24 5/29 5/31 6/4 6/7 6/11 6/14  5/15 5/17 5/21 5/24   approved Used 4 5 6 7 8 9 10  1 2 3 4    Remaining  4 3       7 6 5 4         Precautions fall risk       Manuals                                    Neuro Re-Ed         Sit to stand 3x10 with 10# bolster 3x10 with 10# bolster 3x10 with 10# bolster hold     Step ups    Up and overs solo step 4\" and 2 6\" steps with R LE lead ascending, L LE lead descending    Heel tap   4\" step 3x10  4\" 3x10    TKE Purple TB 3x10 Purple TB 3x10 5\"  Purple TB - 2x10 R LE Blue TB - 3x10 R LE   Sidelying test        Semont        Hurdles 6\" hurdles fwd focus on maintain some flexion - 4 laps solo step    " "8\" hurdles (6) fwd, alternating which foot leads, 3 laps     Cone taps         Ther Ex        treadmill 5% cinline for 5 min 1.6 mph b/l UE 5% cinline for 5 min 1.6 mph b/l UE 5% incline for 8 min, 1.6 mph with b/l UE 5% incline for 8 min, 1.6 mph with b/l UE    NUstep        Heel raise     30x with b/l LE   Glut sets        Supine Heel slide        Supine Quad set        LAQ   10# 3x10      Supine hip abd     Sidelying - 3x10   SAQ        SLR 2x10 Flex/abd/ext 2x10 ea b/l 3x10 no aw - focus on full extension  2.5# 3x10   Ther Activity                        Gait Training        No AD        outdoors     Up and down sidewalk incline with SPC    Modalities                                                                              "

## 2024-06-17 ENCOUNTER — APPOINTMENT (OUTPATIENT)
Dept: PHYSICAL THERAPY | Facility: CLINIC | Age: 74
End: 2024-06-17
Payer: COMMERCIAL

## 2024-06-18 ENCOUNTER — APPOINTMENT (OUTPATIENT)
Dept: PHYSICAL THERAPY | Facility: CLINIC | Age: 74
End: 2024-06-18
Payer: COMMERCIAL

## 2024-06-19 ENCOUNTER — OFFICE VISIT (OUTPATIENT)
Dept: PHYSICAL THERAPY | Facility: CLINIC | Age: 74
End: 2024-06-19
Payer: COMMERCIAL

## 2024-06-19 DIAGNOSIS — M17.11 PRIMARY OSTEOARTHRITIS OF RIGHT KNEE: Primary | ICD-10-CM

## 2024-06-19 DIAGNOSIS — R26.2 AMBULATORY DYSFUNCTION: ICD-10-CM

## 2024-06-19 PROCEDURE — 97110 THERAPEUTIC EXERCISES: CPT | Performed by: PHYSICAL THERAPIST

## 2024-06-19 PROCEDURE — 97112 NEUROMUSCULAR REEDUCATION: CPT | Performed by: PHYSICAL THERAPIST

## 2024-06-19 NOTE — PROGRESS NOTES
"Daily Note     Today's date: 2024  Patient name: Tian Garcia  : 1950  MRN: [id number]  Referring provider: Dot Galindo MD  Dx:   Encounter Diagnosis     ICD-10-CM    1. Primary osteoarthritis of right knee  M17.11       2. Ambulatory dysfunction  R26.2                      Subjective: Patient reports to therapy this date with reports of back and shoulder pain in addition to knee pain      Objective: See treatment diary below      Assessment: Assigend lunges as well as issued stronger TB for TKE at home. Pt continues to have significant lack of strength in end range extension of knee. Discussed possible transfer to ortho PT in order to address back and shoulder pain. Pt will be consulting with PCP. Will continue to focus on balance and strength until then. Will RE next week.       Plan: Continue per plan of care.      Short Term Goal Expiration Date:(24)  Long Term Goal Expiration Date: (24)  POC Expiration Date: (24)      POC expires Unit limit Auth Expiration date PT/OT/ST + Visit Limit?   24 bomn 24 bomn     24                      Visit/Unit Tracking  AUTH Status:  Date        approved Used 4 5 6 7 8 1 2 3        Remaining  4 3    7 6 5             Precautions fall risk       Manuals                                    Neuro Re-Ed         Sit to stand 3x10 with 10# bolster 3x10 with 10# bolster 3x10 with tidal tank     Step ups   With R LE with blue TB pulling into TKE Up and overs solo step 4\" and 2 6\" steps with R LE lead ascending, L LE lead descending    Heel tap   3x10 6\" step  4\" 3x10    TKE Purple TB 3x10 Purple TB 3x10 5\"  Purple TB - 2x10 R LE Blue TB - 3x10 R LE   Backwards walking   Ht/hn 4 laps //bars no ue             Hurdles 6\" hurdles fwd focus on maintain some flexion - 4 laps solo step    8\" hurdles (6) fwd, alternating which foot leads, 3 laps     Cone taps         Ther Ex      "   treadmill 5% cinline for 5 min 1.6 mph b/l UE 5% cinline for 5 min 1.6 mph b/l UE 5% cinline for 5 min 1.6 mph b/l UE 5% incline for 8 min, 1.6 mph with b/l UE    NUstep        Heel raise   50x with b/l LE/UE  30x with b/l LE   Glut sets        lunges   3 laps //bars with UE             Supine Heel slide        Supine Quad set        LAQ        Supine hip abd     Sidelying - 3x10   SAQ        SLR 2x10 Flex/abd/ext 2x10 ea b/l   2.5# 3x10   Ther Activity                        Gait Training        No AD        outdoors     Up and down sidewalk incline with SPC    Modalities

## 2024-06-21 ENCOUNTER — OFFICE VISIT (OUTPATIENT)
Dept: PHYSICAL THERAPY | Facility: CLINIC | Age: 74
End: 2024-06-21
Payer: COMMERCIAL

## 2024-06-21 DIAGNOSIS — R26.2 AMBULATORY DYSFUNCTION: ICD-10-CM

## 2024-06-21 DIAGNOSIS — M17.11 PRIMARY OSTEOARTHRITIS OF RIGHT KNEE: Primary | ICD-10-CM

## 2024-06-21 PROCEDURE — 97110 THERAPEUTIC EXERCISES: CPT | Performed by: PHYSICAL THERAPIST

## 2024-06-21 PROCEDURE — 97112 NEUROMUSCULAR REEDUCATION: CPT | Performed by: PHYSICAL THERAPIST

## 2024-06-21 NOTE — PROGRESS NOTES
"Daily Note     Today's date: 2024  Patient name: Tian Garcia  : 1950  MRN: [id number]  Referring provider: Dot Galindo MD  Dx:   Encounter Diagnosis     ICD-10-CM    1. Primary osteoarthritis of right knee  M17.11       2. Ambulatory dysfunction  R26.2                      Subjective: patient reports to physical therapy this date with no new changes to report. Would like to get to the pool and swim      Objective: See treatment diary below      Assessment: Patient was able to walk over uneven surface and step over alen son uneven surface in solo step. He had a few instances of loss of balance but after rest was able to complete and maintain balance.       Plan: Continue per plan of care.      Short Term Goal Expiration Date:(24)  Long Term Goal Expiration Date: (24)  POC Expiration Date: (24)      POC expires Unit limit Auth Expiration date PT/OT/ST + Visit Limit?   24 bomn 24 bomn     24                      Visit/Unit Tracking  AUTH Status:  Date  6      approved Used 4 5 6 7 8 1 2 3 4       Remaining  4 3    7 6 5 4            Precautions fall risk       Manuals                                     Neuro Re-Ed         Sit to stand 3x10 with 10# bolster 3x10 with 10# bolster 3x10 with tidal tank 2x10 with feet on airex    Step ups   With R LE with blue TB pulling into TKE Up and over steps 6\" and 8\"  - 3 laps fwd/lat ea    Heel tap   3x10 6\" step     TKE Purple TB 3x10 Purple TB 3x10 5\"      Backwards walking   Ht/hn 4 laps //bars no ue             Hurdles 6\" hurdles fwd focus on maintain some flexion - 4 laps solo step    Over uneven surface - 5 laps with UE     Cone taps         Ther Ex        treadmill 5% cinline for 5 min 1.6 mph b/l UE 5% cinline for 5 min 1.6 mph b/l UE 5% cinline for 5 min 1.6 mph b/l UE     NUstep        Heel raise   50x with b/l LE/UE     Glut sets        lunges   3 laps //bars " with UE             Supine Heel slide        Supine Quad set        LAQ        Supine hip abd        SAQ        SLR 2x10 Flex/abd/ext 2x10 ea b/l      Ther Activity                        Gait Training        No AD        outdoors        Modalities

## 2024-06-24 ENCOUNTER — APPOINTMENT (OUTPATIENT)
Dept: PHYSICAL THERAPY | Facility: CLINIC | Age: 74
End: 2024-06-24
Payer: COMMERCIAL

## 2024-06-25 ENCOUNTER — OFFICE VISIT (OUTPATIENT)
Dept: PHYSICAL THERAPY | Facility: CLINIC | Age: 74
End: 2024-06-25
Payer: COMMERCIAL

## 2024-06-25 DIAGNOSIS — M17.11 PRIMARY OSTEOARTHRITIS OF RIGHT KNEE: Primary | ICD-10-CM

## 2024-06-25 DIAGNOSIS — R26.2 AMBULATORY DYSFUNCTION: ICD-10-CM

## 2024-06-25 PROCEDURE — 97112 NEUROMUSCULAR REEDUCATION: CPT | Performed by: PHYSICAL THERAPIST

## 2024-06-25 PROCEDURE — 97110 THERAPEUTIC EXERCISES: CPT | Performed by: PHYSICAL THERAPIST

## 2024-06-25 NOTE — PROGRESS NOTES
Re-Evaluation Note     Today's date: 2024  Patient name: Tian Garcia  : 1950  MRN: [id number]  Referring provider: Dot Galindo MD  Dx:   Encounter Diagnosis     ICD-10-CM    1. Primary osteoarthritis of right knee  M17.11       2. Ambulatory dysfunction  R26.2                      Subjective: Feeling sore and stiff today, spent a lot of time standing yesterday at a swim meet      Objective: See treatment diary below     FGA -     Functional Outcomes:   TUG: -   17.13 sec with rollator  (IE)   - 13 sec with RW   - 11 sec with SPC     MMT   R knee: 4/5 extension  4/5 flexion  R hip abd: 2+/5  SLR extension lag: 10 degrees     ROM:  R knee flex: 128 degrees  R knee ext: 0 degrees       Assessment: Since last progress update patient has made improvements in strength, range of motion, overall balance and is ambulating within the community wit least restrictive asssistive device. However, pt continues to have significant weakness in end range knee extension, bilateral hip abductors and extensors, decreased balance at a high risk of falls, decreased endurance and gait efficiency and overall decreased functional mobility. Pt will benefit from skilled therapy intervention continued at 2x/week for 8 more weeks to focus on progression of strength, balance, endurance, gait efficiency and continue to improve independence in functional mobility.       Goals  STG  Pt will have 90 degrees knee flexion ROM in 4 weeks - met  Pt will have 0 degrees knee extension ROM in 4 weeks - met  Pt will have 3/5 MMT of right knee extension in 4 weeks - met  Pt will be independent with HEP in 4 weeks - met  Pt will be able to ascend/descend stairs independently in 4 weeks - met     LTG  Pt will have full knee ROM of R knee in 8 weeks - met  Pt will have at least 4/5 MMT strength of right knee extensors in 8 weeks  Pt will be independent with mobility using LRAD in 8 weeks - met  Pt will complete TUG  "below 13 seconds in 8 weeks - met    Updated goals:  STG:  Pt will complete 6 mwt in 1200 ft with SPC in 4 weeks  Pt will score above 18/30 on FGA in 4 weeks  Pt will improve HEP to independence in 4 weeks    LTG:  Pt will improve 6 mwt to 1400 ft with SPC in 4 weeks  Pt will score above fall risk cut off on FGA In 8 weeks  Pt will be able to ambulate within the community for longer ditsances without difficulty with his wife in 8 weeks      Patient goals: improve balance and strength      Plan: Continue per plan of care.      Short Term Goal Expiration Date:(7/25/24)  Long Term Goal Expiration Date: (8/25/24)  POC Expiration Date: (8/25/24)      POC expires Unit limit Auth Expiration date PT/OT/ST + Visit Limit?   6/5/24 bomn 5/17/24 bomn     6/17/24                      Visit/Unit Tracking  AUTH Status:  Date 5/24 5/29 5/31 6/4 6/7 6/11 6/14 6/19 6/21      approved Used 4 5 6 7 8 1 2 3 4       Remaining  4 3    7 6 5 4            Precautions fall risk       Manuals 6/11 6/14 6/19 6/21 6/25                                   Neuro Re-Ed         Sit to stand 3x10 with 10# bolster 3x10 with 10# bolster 3x10 with tidal tank 2x10 with feet on airex    Step ups   With R LE with blue TB pulling into TKE Up and over steps 6\" and 8\"  - 3 laps fwd/lat ea 8\" step fwd 15x ea    Heel tap   3x10 6\" step     TKE Purple TB 3x10 Purple TB 3x10 5\"      Backwards walking   Ht/hn 4 laps //bars no ue             Hurdles 6\" hurdles fwd focus on maintain some flexion - 4 laps solo step    Over uneven surface - 5 laps with UE     Cone taps         Ther Ex        treadmill 5% cinline for 5 min 1.6 mph b/l UE 5% cinline for 5 min 1.6 mph b/l UE 5% cinline for 5 min 1.6 mph b/l UE     NUstep        Heel raise   50x with b/l LE/UE     Glut sets        lunges   3 laps //bars with UE  3 laps //bars with UE            Supine Heel slide        Supine Quad set        LAQ        Supine hip abd        SAQ        SLR 2x10 Flex/abd/ext 2x10 ea b/l    "   Ther Activity                        Gait Training        No AD        outdoors        Modalities

## 2024-06-28 ENCOUNTER — OFFICE VISIT (OUTPATIENT)
Dept: PHYSICAL THERAPY | Facility: CLINIC | Age: 74
End: 2024-06-28
Payer: COMMERCIAL

## 2024-06-28 DIAGNOSIS — R26.2 AMBULATORY DYSFUNCTION: ICD-10-CM

## 2024-06-28 DIAGNOSIS — M17.11 PRIMARY OSTEOARTHRITIS OF RIGHT KNEE: Primary | ICD-10-CM

## 2024-06-28 PROCEDURE — 97112 NEUROMUSCULAR REEDUCATION: CPT | Performed by: PHYSICAL THERAPIST

## 2024-06-28 PROCEDURE — 97110 THERAPEUTIC EXERCISES: CPT | Performed by: PHYSICAL THERAPIST

## 2024-06-28 NOTE — PROGRESS NOTES
"Daily Note     Today's date: 2024  Patient name: Tian Garcia  : 1950  MRN: [id number]  Referring provider: Dot Galindo MD  Dx:   Encounter Diagnosis     ICD-10-CM    1. Primary osteoarthritis of right knee  M17.11       2. Ambulatory dysfunction  R26.2                      Subjective: Patient presents to physical therpay this date with reports of back pain and has been taking pain medications to reduce it daily with only moderate success.       Objective: See treatment diary below    Functional Outcomes:   6 mwt: 800 ft with SPC   Gait speed:0.9 m/s    Assessment: Pt ocntinues to have weakness in multiple aspects of his lower extremity and likely due to weakness and the way it impacts his gait there is greater strain placed on his low back. Discussed this with patient and emphasized continued focus on HEP and strengthening program.       Plan: Continue per plan of care.      Short Term Goal Expiration Date:(24)  Long Term Goal Expiration Date: (24)  POC Expiration Date: (24)      POC expires Unit limit Auth Expiration date PT/OT/ST + Visit Limit?   24 bomn 24 bomn     24                      Visit/Unit Tracking  AUTH Status:  Date      approved Used 4 5 6 7 8 1 2 3 4 5      Remaining  4 3    7 6 5 4 3           Precautions fall risk       Manuals                                    Neuro Re-Ed         Sit to stand   3x10 with tidal tank 2x10 with feet on airex    Step ups   With R LE with blue TB pulling into TKE Up and over steps 6\" and 8\"  - 3 laps fwd/lat ea 8\" step fwd 15x ea    Heel tap 3x10 ea 6\" stpe   3x10 6\" step     TKE        Backwards walking   Ht/hn 4 laps //bars no ue     HKM 5 laps //bars no UE       Hurdles    Over uneven surface - 5 laps with UE     Cone taps         Ther Ex        treadmill   5% cinline for 5 min 1.6 mph b/l UE     NUstep        Heel raise   50x with b/l LE/UE  "    Glut sets        lunges 5 laps //bars with UE  3 laps //bars with UE  3 laps //bars with UE            Supine Heel slide        Supine Quad set        LAQ        Supine hip abd        SAQ        SLR        Ther Activity                        Gait Training        No AD        outdoors        Modalities

## 2024-07-02 ENCOUNTER — OFFICE VISIT (OUTPATIENT)
Dept: PHYSICAL THERAPY | Facility: CLINIC | Age: 74
End: 2024-07-02
Payer: COMMERCIAL

## 2024-07-02 DIAGNOSIS — R26.2 AMBULATORY DYSFUNCTION: ICD-10-CM

## 2024-07-02 DIAGNOSIS — M17.11 PRIMARY OSTEOARTHRITIS OF RIGHT KNEE: Primary | ICD-10-CM

## 2024-07-02 PROCEDURE — 97150 GROUP THERAPEUTIC PROCEDURES: CPT | Performed by: PHYSICAL THERAPIST

## 2024-07-02 PROCEDURE — 97110 THERAPEUTIC EXERCISES: CPT | Performed by: PHYSICAL THERAPIST

## 2024-07-02 NOTE — PROGRESS NOTES
Daily Note     Today's date: 2024  Patient name: Tian Garcia  : 1950  MRN: [id number]  Referring provider: Dot Galindo MD  Dx:   Encounter Diagnosis     ICD-10-CM    1. Primary osteoarthritis of right knee  M17.11       2. Ambulatory dysfunction  R26.2                      Subjective: Pt reports being a little lightheaded when standing the last few days. Going to get bloodwork after appt.       Objective: See treatment diary below    Vitals:  BP: 105/82  PO2 - unable to read  HR: unable to read      Assessment: Pt stood at end of session and felt dizzy, felt OK to walk out of clinic. Pulse ox was unable to read vitals. When getting water just prior to exiiting he fell forward and braced against the water cooler due to lightheadedness. He was assisted to sit in chair and took BP which was WNL. After 2 cups of water and seated rest he had no dizziness when he stood to walk out of the door. Encouraged pt to f/u with PCP.     He also was introduced to home exercise program and monitoring program this session. Spent 5 minute reviewing with patient. He is to complete program and f/u at home.       Plan: Continue per plan of care.      Short Term Goal Expiration Date:(24)  Long Term Goal Expiration Date: (24)  POC Expiration Date: (24)      POC expires Unit limit Auth Expiration date PT/OT/ST + Visit Limit?   24 bomn 24 bomn     24                      Visit/Unit Tracking  AUTH Status:  Date  6 6    approved Used 4 5 6 7 8 1 2 3 4 5 7     Remaining  4 3    7 6 5 4 3 1          Precautions fall risk   Access Code: [email or web address]  URL: [email or web address]  Date: 2024  Prepared by: Edelmira Hernandez    Exercises  - Supine Bridge  - 1 x daily - 7 x weekly - 3 sets - 10 reps  - Supine Straight Leg Raise  - 1 x daily - 7 x weekly - 3 sets - 10 reps  - Beginner Side Leg Lift  - 1 x daily - 7 x weekly - 3 sets - 10  "reps  - Clamshell with Resistance  - 1 x daily - 7 x weekly - 3 sets - 10 reps  - Walking March  - 1 x daily - 7 x weekly - 3 sets - 10 reps  - Lateral Step Up  - 1 x daily - 7 x weekly - 3 sets - 10 reps  - Step Up  - 1 x daily - 7 x weekly - 3 sets - 10 reps  - Standard Lunge  - 1 x daily - 7 x weekly - 3 sets - 10 reps  - Forward Step Down with Heel Tap and Counter Support  - 1 x daily - 7 x weekly - 3 sets - 10 reps    Manuals 6/28 7/1 6/19 6/21 6/25                                   Neuro Re-Ed         Sit to stand   3x10 with tidal tank 2x10 with feet on airex    Step ups  8\" in //bars with 4# aw fwd 15 ea, 15 x lat with R LE only With R LE with blue TB pulling into TKE Up and over steps 6\" and 8\"  - 3 laps fwd/lat ea 8\" step fwd 15x ea    Heel tap 3x10 ea 6\" stpe   3x10 6\" step     TKE        Backwards walking   Ht/hn 4 laps //bars no ue     HKM 5 laps //bars no UE 3 laps //bars      Hurdles    Over uneven surface - 5 laps with UE     Cone taps         Ther Ex        treadmill   5% cinline for 5 min 1.6 mph b/l UE     NUstep  10 min with L 8 resistance - CV and muscular endurance      Heel raise   50x with b/l LE/UE     Glut sets        lunges 5 laps //bars with UE  3 laps //bars with UE  3 laps //bars with UE            Supine Heel slide        Supine Quad set        LAQ        Supine hip abd        SAQ        SLR        Ther Activity                        Gait Training        No AD        outdoors        Modalities                                                                                        "

## 2024-07-03 ENCOUNTER — ANTICOAG VISIT (OUTPATIENT)
Dept: FAMILY MEDICINE CLINIC | Facility: HOSPITAL | Age: 74
End: 2024-07-03

## 2024-07-03 ENCOUNTER — OFFICE VISIT (OUTPATIENT)
Dept: FAMILY MEDICINE CLINIC | Facility: HOSPITAL | Age: 74
End: 2024-07-03
Payer: COMMERCIAL

## 2024-07-03 VITALS
SYSTOLIC BLOOD PRESSURE: 96 MMHG | HEIGHT: 71 IN | HEART RATE: 69 BPM | RESPIRATION RATE: 16 BRPM | TEMPERATURE: 97.4 F | DIASTOLIC BLOOD PRESSURE: 70 MMHG | BODY MASS INDEX: 30.66 KG/M2 | WEIGHT: 219 LBS | OXYGEN SATURATION: 98 %

## 2024-07-03 DIAGNOSIS — I25.10 ATHEROSCLEROSIS OF NATIVE CORONARY ARTERY OF NATIVE HEART WITHOUT ANGINA PECTORIS: ICD-10-CM

## 2024-07-03 DIAGNOSIS — I10 PRIMARY HYPERTENSION: ICD-10-CM

## 2024-07-03 DIAGNOSIS — I48.0 PAROXYSMAL A-FIB (HCC): Primary | ICD-10-CM

## 2024-07-03 DIAGNOSIS — I35.0 MODERATE AORTIC STENOSIS: ICD-10-CM

## 2024-07-03 DIAGNOSIS — R06.02 SOB (SHORTNESS OF BREATH) ON EXERTION: ICD-10-CM

## 2024-07-03 LAB
ALBUMIN/CREAT UR: 35 MG/G CREAT (ref 0–29)
APPEARANCE UR: CLEAR
ATRIAL RATE: 82 DEGREES
BACTERIA URNS QL MICRO: NORMAL
BASOPHILS # BLD AUTO: 0 X10E3/UL (ref 0–0.2)
BASOPHILS NFR BLD AUTO: 1 %
BILIRUB UR QL STRIP: NEGATIVE
BUN SERPL-MCNC: 45 MG/DL (ref 8–27)
BUN/CREAT SERPL: 19 (ref 10–24)
CALCIUM SERPL-MCNC: 10 MG/DL (ref 8.6–10.2)
CASTS URNS QL MICRO: NORMAL /LPF
CHLORIDE SERPL-SCNC: 91 MMOL/L (ref 96–106)
CO2 SERPL-SCNC: 27 MMOL/L (ref 20–29)
COLOR UR: YELLOW
CREAT SERPL-MCNC: 2.42 MG/DL (ref 0.76–1.27)
CREAT UR-MCNC: 92.8 MG/DL
EGFR: 28 ML/MIN/1.73
EOSINOPHIL # BLD AUTO: 0 X10E3/UL (ref 0–0.4)
EOSINOPHIL NFR BLD AUTO: 1 %
EPI CELLS #/AREA URNS HPF: NORMAL /HPF (ref 0–10)
ERYTHROCYTE [DISTWIDTH] IN BLOOD BY AUTOMATED COUNT: 13.7 % (ref 11.6–15.4)
GLUCOSE SERPL-MCNC: 148 MG/DL (ref 70–99)
GLUCOSE UR QL: NEGATIVE
HCT VFR BLD AUTO: 41.9 % (ref 37.5–51)
HGB BLD-MCNC: 13.6 G/DL (ref 13–17.7)
HGB UR QL STRIP: ABNORMAL
IMM GRANULOCYTES # BLD: 0 X10E3/UL (ref 0–0.1)
IMM GRANULOCYTES NFR BLD: 0 %
KETONES UR QL STRIP: NEGATIVE
LEUKOCYTE ESTERASE UR QL STRIP: NEGATIVE
LYMPHOCYTES # BLD AUTO: 1.1 X10E3/UL (ref 0.7–3.1)
LYMPHOCYTES NFR BLD AUTO: 19 %
MCH RBC QN AUTO: 29.3 PG (ref 26.6–33)
MCHC RBC AUTO-ENTMCNC: 32.5 G/DL (ref 31.5–35.7)
MCV RBC AUTO: 90 FL (ref 79–97)
MICRO URNS: ABNORMAL
MICROALBUMIN UR-MCNC: 32.7 UG/ML
MONOCYTES # BLD AUTO: 0.5 X10E3/UL (ref 0.1–0.9)
MONOCYTES NFR BLD AUTO: 9 %
NEUTROPHILS # BLD AUTO: 4.2 X10E3/UL (ref 1.4–7)
NEUTROPHILS NFR BLD AUTO: 70 %
NITRITE UR QL STRIP: NEGATIVE
PH UR STRIP: 6 [PH] (ref 5–7.5)
PLATELET # BLD AUTO: 216 X10E3/UL (ref 150–450)
POTASSIUM SERPL-SCNC: 3 MMOL/L (ref 3.5–5.2)
PROT UR QL STRIP: ABNORMAL
PROT UR-MCNC: 22.4 MG/DL
PROT/CREAT UR: 241 MG/G CREAT (ref 0–200)
RBC # BLD AUTO: 4.64 X10E6/UL (ref 4.14–5.8)
RBC #/AREA URNS HPF: NORMAL /HPF (ref 0–2)
SODIUM SERPL-SCNC: 139 MMOL/L (ref 134–144)
SP GR UR: 1.01 (ref 1–1.03)
UROBILINOGEN UR STRIP-ACNC: 0.2 MG/DL (ref 0.2–1)
WBC # BLD AUTO: 6 X10E3/UL (ref 3.4–10.8)
WBC #/AREA URNS HPF: NORMAL /HPF (ref 0–5)

## 2024-07-03 PROCEDURE — 99214 OFFICE O/P EST MOD 30 MIN: CPT | Performed by: INTERNAL MEDICINE

## 2024-07-03 PROCEDURE — 93000 ELECTROCARDIOGRAM COMPLETE: CPT | Performed by: INTERNAL MEDICINE

## 2024-07-03 RX ORDER — TORSEMIDE 20 MG/1
20 TABLET ORAL EVERY MORNING
Qty: 90 TABLET | Refills: 1 | Status: SHIPPED | OUTPATIENT
Start: 2024-07-03

## 2024-07-03 NOTE — ASSESSMENT & PLAN NOTE
Patient presents with chief complaint of lightheadedness.    The lightheadedness started about 3 days ago and is associated with fatigue but no chest pain, palpitations, shortness of breath.    He feels lightheaded when standing up from a sitting position or after working with physical therapy.  Yesterday he felt lightheaded when he was undergoing physical therapy and he had to sit down because he felt that he was going to pass out.  The feeling resolved after sitting for couple minutes and he was able to drive home.    He has PAF and he had undergo cardioversion before.  He denies palpitations, signs of GI/ bleeding.    Patient was orthostatic today and was found to be in irregularly irregular heart rhythm with controlled rates.  EKG showed atrial fibrillation with right Billetz block, no signs of acute ischemia heart was 82 bpm    Patient's lightheadedness on orthostatic changes and fatigue is likely caused by orthostatic hypotension as well as atrial fibrillation who is rate is controlled today.    I asked patient to continue taking metoprolol but to hold his torsemide for 2 days  INR was 4.9 today and we already gave him instructions on how to take warfarin    I asked him to return in a week to assess his atrial fibrillation, heart rate and orthostatic hypotension.  In the meantime I advised him to make sure that he has something he can hold onto when he stands up from a sitting position

## 2024-07-03 NOTE — ASSESSMENT & PLAN NOTE
Patient has coronary artery disease.  He was concerned whether he was having a heart attack causing his lightheadedness.  EKG shows no signs of ischemia.  His lightheadedness is most likely caused by atrial fibrillation/orthostasis    Continue aspirin, atorvastatin, Toprol

## 2024-07-03 NOTE — ASSESSMENT & PLAN NOTE
Patient has hypertension.  His blood pressure runs less than 110 at home, controlled.  His blood pressure today dropped to 96 when standing up.  He was not symptomatic today.  I asked him to hold his torsemide for 2 days, continue metoprolol.  Advised him to resume torsemide at a dose of 20 mg daily instead of 20 mg alternating with 30 mg    Will reassess his blood pressure next week

## 2024-07-03 NOTE — PROGRESS NOTES
Assessment/Plan:    Paroxysmal A-fib (HCC)  Patient presents with chief complaint of lightheadedness.    The lightheadedness started about 3 days ago and is associated with fatigue but no chest pain, palpitations, shortness of breath.    He feels lightheaded when standing up from a sitting position or after working with physical therapy.  Yesterday he felt lightheaded when he was undergoing physical therapy and he had to sit down because he felt that he was going to pass out.  The feeling resolved after sitting for couple minutes and he was able to drive home.    He has PAF and he had undergo cardioversion before.  He denies palpitations, signs of GI/ bleeding.    Patient was orthostatic today and was found to be in irregularly irregular heart rhythm with controlled rates.  EKG showed atrial fibrillation with right Billetz block, no signs of acute ischemia heart was 82 bpm    Patient's lightheadedness on orthostatic changes and fatigue is likely caused by orthostatic hypotension as well as atrial fibrillation who is rate is controlled today.    I asked patient to continue taking metoprolol but to hold his torsemide for 2 days  INR was 4.9 today and we already gave him instructions on how to take warfarin    I asked him to return in a week to assess his atrial fibrillation, heart rate and orthostatic hypotension.  In the meantime I advised him to make sure that he has something he can hold onto when he stands up from a sitting position    Primary hypertension  Patient has hypertension.  His blood pressure runs less than 110 at home, controlled.  His blood pressure today dropped to 96 when standing up.  He was not symptomatic today.  I asked him to hold his torsemide for 2 days, continue metoprolol.  Advised him to resume torsemide at a dose of 20 mg daily instead of 20 mg alternating with 30 mg    Will reassess his blood pressure next week    Atherosclerosis of native coronary artery of native heart without angina  "pectoris  Patient has coronary artery disease.  He was concerned whether he was having a heart attack causing his lightheadedness.  EKG shows no signs of ischemia.  His lightheadedness is most likely caused by atrial fibrillation/orthostasis    Continue aspirin, atorvastatin, Toprol       Diagnoses and all orders for this visit:    Paroxysmal A-fib (HCC)    Primary hypertension  -     torsemide (DEMADEX) 20 mg tablet; Take 1 tablet (20 mg total) by mouth every morning    SOB (shortness of breath) on exertion  -     POCT ECG    Moderate aortic stenosis    Atherosclerosis of native coronary artery of native heart without angina pectoris          Subjective:      Patient ID: Tian Garcia is a 74 y.o. male.      HPI    Patient presents for follow-up of chronic conditions as detailed in the assessment and plan.      The following portions of the patient's history were reviewed and updated as appropriate: current medications, past family history, past medical history, past social history, past surgical history and problem list.    Review of Systems      Objective:    BP 96/70 (BP Location: Left arm, Patient Position: Standing)   Pulse 69   Temp (!) 97.4 °F (36.3 °C) (Tympanic)   Resp 16   Ht 5' 11\" (1.803 m)   Wt 99.3 kg (219 lb)   SpO2 98%   BMI 30.54 kg/m²      Physical Exam  Constitutional:       General: He is not in acute distress.     Appearance: He is not toxic-appearing.   HENT:      Head: Normocephalic.   Eyes:      Pupils: Pupils are equal, round, and reactive to light.   Neck:      Comments: He had no JVD  Cardiovascular:      Rate and Rhythm: Normal rate. Rhythm irregular.      Heart sounds: Murmur (Systolic murmur was heard) heard.   Pulmonary:      Effort: No respiratory distress.      Breath sounds: No wheezing or rales.   Musculoskeletal:      Cervical back: Neck supple.   Skin:     General: Skin is warm and dry.      Comments: He had no lower extremity edema   Neurological:      Mental Status: He " is alert.             Nelly Baker MD

## 2024-07-05 ENCOUNTER — OFFICE VISIT (OUTPATIENT)
Dept: PHYSICAL THERAPY | Facility: CLINIC | Age: 74
End: 2024-07-05
Payer: COMMERCIAL

## 2024-07-05 DIAGNOSIS — M17.11 PRIMARY OSTEOARTHRITIS OF RIGHT KNEE: Primary | ICD-10-CM

## 2024-07-05 DIAGNOSIS — R26.2 AMBULATORY DYSFUNCTION: ICD-10-CM

## 2024-07-05 PROCEDURE — 97110 THERAPEUTIC EXERCISES: CPT | Performed by: PHYSICAL THERAPIST

## 2024-07-05 PROCEDURE — 97112 NEUROMUSCULAR REEDUCATION: CPT | Performed by: PHYSICAL THERAPIST

## 2024-07-05 NOTE — PROGRESS NOTES
Daily Note     Today's date: 2024  Patient name: Tian Garcia  : 1950  MRN: [id number]  Referring provider: Dot Galindo MD  Dx:   Encounter Diagnosis     ICD-10-CM    1. Primary osteoarthritis of right knee  M17.11       2. Ambulatory dysfunction  R26.2                      Subjective: Feeling less lightheaded, changed medicaiton       Objective: See treatment diary below    BP: 120/80    Assessment: Demonstrating better control with LE stability during stepping with hurdles compared to previous sessions. He also was able to coordinate lunges better than last session. Continue to encourage pt to attempt to complete Hep regularly for hip strengtheing.       Plan: Continue per plan of care.      Short Term Goal Expiration Date:(24)  Long Term Goal Expiration Date: (24)  POC Expiration Date: (24)      POC expires Unit limit Auth Expiration date PT/OT/ST + Visit Limit?   24 bomn 24 bomn     24                      Visit/Unit Tracking  AUTH Status:  Date    approved Used 4 5 6 7 8 1 2 3 4 5 7 8    Remaining  4 3    7 6 5 4 3 1 0         Precautions fall risk   Access Code: [email or web address]  URL: [email or web address]  Date: 2024  Prepared by: Edelmira Hernandez    Exercises  - Supine Bridge  - 1 x daily - 7 x weekly - 3 sets - 10 reps  - Supine Straight Leg Raise  - 1 x daily - 7 x weekly - 3 sets - 10 reps  - Beginner Side Leg Lift  - 1 x daily - 7 x weekly - 3 sets - 10 reps  - Clamshell with Resistance  - 1 x daily - 7 x weekly - 3 sets - 10 reps  - Walking March  - 1 x daily - 7 x weekly - 3 sets - 10 reps  - Lateral Step Up  - 1 x daily - 7 x weekly - 3 sets - 10 reps  - Step Up  - 1 x daily - 7 x weekly - 3 sets - 10 reps  - Standard Lunge  - 1 x daily - 7 x weekly - 3 sets - 10 reps  - Forward Step Down with Heel Tap and Counter Support  - 1 x daily - 7 x weekly - 3 sets - 10 reps    Manuals   "7/1 7/5 6/25                                   Neuro Re-Ed         Sit to stand        Step ups  8\" in //bars with 4# aw fwd 15 ea, 15 x lat with R LE only   8\" step fwd 15x ea    Heel tap 3x10 ea 6\" stpe        TKE        Backwards walking                tandem   Walking 5 laps no UE //bars     HKM 5 laps //bars no UE 3 laps //bars      Hurdles   3  laps ea fwd/lat no UE //bars      Cone taps         Ther Ex        treadmill   10 min with 4% incline  - 1.6 mph     NUstep  10 min with L 8 resistance - CV and muscular endurance      Heel raise   30x b/l LE     Glut sets        lunges 5 laps //bars with UE  5 laps //bars min UE  3 laps //bars with UE            Supine Heel slide        Supine Quad set        LAQ        Supine hip abd        SAQ        SLR        Ther Activity                        Gait Training        No AD        outdoors        Modalities                                                                                          "

## 2024-07-08 ENCOUNTER — TELEPHONE (OUTPATIENT)
Dept: NEPHROLOGY | Facility: CLINIC | Age: 74
End: 2024-07-08

## 2024-07-08 DIAGNOSIS — N02.B9 IGA NEPHROPATHY DETERMINED BY BIOPSY OF KIDNEY: ICD-10-CM

## 2024-07-08 DIAGNOSIS — N18.32 STAGE 3B CHRONIC KIDNEY DISEASE (CKD) (HCC): Primary | ICD-10-CM

## 2024-07-08 DIAGNOSIS — N17.9 AKI (ACUTE KIDNEY INJURY) (HCC): ICD-10-CM

## 2024-07-08 NOTE — TELEPHONE ENCOUNTER
----- Message from Pia Pan PA-C sent at 7/5/2024  3:56 PM EDT -----  Please inform patient potassium was low at 3 on his last blood work.  If he is currently taking his potassium chloride would increase to 2 tablets twice a day, if he is not taking would start taking 1 tablet twice a day as prescribed.  Repeat BMP in 1 week

## 2024-07-08 NOTE — TELEPHONE ENCOUNTER
Patient is aware and he is currently taking his potassium chloride and now  will increase to 2 tablets twice a day. No further questions at this time.            BMP added and mailed to patient home address.

## 2024-07-09 ENCOUNTER — OFFICE VISIT (OUTPATIENT)
Dept: FAMILY MEDICINE CLINIC | Facility: HOSPITAL | Age: 74
End: 2024-07-09
Payer: COMMERCIAL

## 2024-07-09 ENCOUNTER — OFFICE VISIT (OUTPATIENT)
Dept: CARDIOLOGY CLINIC | Facility: CLINIC | Age: 74
End: 2024-07-09
Payer: COMMERCIAL

## 2024-07-09 VITALS
HEART RATE: 76 BPM | BODY MASS INDEX: 31.36 KG/M2 | RESPIRATION RATE: 16 BRPM | OXYGEN SATURATION: 98 % | WEIGHT: 224 LBS | HEIGHT: 71 IN | DIASTOLIC BLOOD PRESSURE: 60 MMHG | SYSTOLIC BLOOD PRESSURE: 92 MMHG | TEMPERATURE: 97.8 F

## 2024-07-09 VITALS
HEIGHT: 71 IN | HEART RATE: 81 BPM | SYSTOLIC BLOOD PRESSURE: 110 MMHG | WEIGHT: 221 LBS | BODY MASS INDEX: 30.94 KG/M2 | DIASTOLIC BLOOD PRESSURE: 58 MMHG

## 2024-07-09 DIAGNOSIS — I25.2 HISTORY OF ST ELEVATION MYOCARDIAL INFARCTION (STEMI): ICD-10-CM

## 2024-07-09 DIAGNOSIS — M19.011 PRIMARY OSTEOARTHRITIS OF RIGHT SHOULDER: ICD-10-CM

## 2024-07-09 DIAGNOSIS — N18.32 STAGE 3B CHRONIC KIDNEY DISEASE (CKD) (HCC): ICD-10-CM

## 2024-07-09 DIAGNOSIS — I25.10 CORONARY ARTERY DISEASE INVOLVING NATIVE CORONARY ARTERY OF NATIVE HEART WITHOUT ANGINA PECTORIS: Chronic | ICD-10-CM

## 2024-07-09 DIAGNOSIS — I10 PRIMARY HYPERTENSION: ICD-10-CM

## 2024-07-09 DIAGNOSIS — I48.0 PAROXYSMAL A-FIB (HCC): Primary | ICD-10-CM

## 2024-07-09 PROCEDURE — 99214 OFFICE O/P EST MOD 30 MIN: CPT | Performed by: INTERNAL MEDICINE

## 2024-07-09 PROCEDURE — G2211 COMPLEX E/M VISIT ADD ON: HCPCS | Performed by: INTERNAL MEDICINE

## 2024-07-09 PROCEDURE — 99214 OFFICE O/P EST MOD 30 MIN: CPT | Performed by: PHYSICIAN ASSISTANT

## 2024-07-09 RX ORDER — METOPROLOL SUCCINATE 25 MG/1
25 TABLET, EXTENDED RELEASE ORAL DAILY
Qty: 90 TABLET | Refills: 1 | Status: SHIPPED | OUTPATIENT
Start: 2024-07-09

## 2024-07-09 RX ORDER — POTASSIUM CHLORIDE 750 MG/1
20 CAPSULE, EXTENDED RELEASE ORAL 2 TIMES DAILY
Qty: 180 CAPSULE | Refills: 1 | Status: SHIPPED | OUTPATIENT
Start: 2024-07-09

## 2024-07-09 RX ORDER — TORSEMIDE 20 MG/1
10 TABLET ORAL EVERY MORNING
Start: 2024-07-09

## 2024-07-09 RX ORDER — TRAMADOL HYDROCHLORIDE 50 MG/1
50 TABLET ORAL 2 TIMES DAILY PRN
Qty: 60 TABLET | Refills: 0 | Status: SHIPPED | OUTPATIENT
Start: 2024-07-09

## 2024-07-09 NOTE — ASSESSMENT & PLAN NOTE
Patient presents for follow-up of atrial fibrillation, orthostatic hypotension.    He decreased his torsemide to 20 mg once a day since last appointment.    He does not feel as lightheaded as he did before: He only feels lightheaded when he stands up in the sun.    He denies chest pain, shortness of breath at rest or with exertion, palpitation feelings.    He remains in atrial fibrillation with controlled rates.    his systolic blood pressure is in the 90s    I decreased the dose of Toprol to 25 mg once a day.    I reached out to cardiology to see if they could see him sooner than the scheduled appointment

## 2024-07-09 NOTE — PATIENT INSTRUCTIONS
Please move your echocardiogram up with scheduling to get this prior to your visit with Dr. Lock   You can try to decrease the Torsemide to 10 mg daily and see if this helps with the dizziness. Get an eye on your weight though and call us if you are gaining weight > 3 lbs I 1 day or 5 lbs in 1 week

## 2024-07-09 NOTE — PROGRESS NOTES
Assessment/Plan:    Paroxysmal A-fib (HCC)  Patient presents for follow-up of atrial fibrillation, orthostatic hypotension.    He decreased his torsemide to 20 mg once a day since last appointment.    He does not feel as lightheaded as he did before: He only feels lightheaded when he stands up in the sun.    He denies chest pain, shortness of breath at rest or with exertion, palpitation feelings.    He remains in atrial fibrillation with controlled rates.    his systolic blood pressure is in the 90s    I decreased the dose of Toprol to 25 mg once a day.    I reached out to cardiology to see if they could see him sooner than the scheduled appointment        Primary hypertension  Patient has chronic hypertension but today he is hypotensive.  He told me that his blood pressure at home runs between 100-110 but today in the office it was in the 90s.  He had no orthostasis today    His lungs were completely clear to auscultation, he had no signs of fluid overload or dehydration.    I decreased the dose of Toprol to 25 mg daily and he will continue torsemide 20 mg daily.    Stage 3b chronic kidney disease (CKD) ([facility])  Lab Results   Component Value Date    EGFR 28 (L) 07/02/2024    EGFR 32 (L) 05/21/2024    EGFR 35 04/09/2024    CREATININE 2.42 (H) 07/02/2024    CREATININE 2.15 (H) 05/21/2024    CREATININE 1.85 (H) 04/09/2024     Patient has stage IIIb CKD.  Renal function was slightly worse on July 2.  He denies urinary retention.  I decreased the dose of torsemide to 20 mg daily.  Continue the same dose.  His potassium was low on July 2 and nephrology increased his potassium to 20 mg twice daily.  Repeat BMP was ordered.  I encouraged patient to get it done this week    Primary osteoarthritis of right shoulder  Patient complains of right shoulder pain which has been chronic and is worse when he abducts his shoulder and rotates his shoulder.  His range of motion on abduction and external rotation is decreased.    Right  "shoulder x-ray in December 2023 showed osteoarthritis of the right shoulder.  He was taking tramadol as needed that relieved the discomfort.  Toradol was last prescribed in December.  I reviewed [place] PDMP    I referred patient to physical therapy for the right shoulder arthritis and I refilled his tramadol.     Diagnoses and all orders for this visit:    Paroxysmal A-fib (HCC)  -     Ambulatory Referral to Cardiology; Future    Primary hypertension  -     metoprolol succinate (TOPROL-XL) 25 mg 24 hr tablet; Take 1 tablet (25 mg total) by mouth daily    Primary osteoarthritis of right shoulder  -     traMADol (Ultram) 50 mg tablet; Take 1 tablet (50 mg total) by mouth 2 (two) times a day as needed for moderate pain  -     Ambulatory Referral to Physical Therapy; Future    Coronary artery disease involving native coronary artery of native heart without angina pectoris  -     potassium chloride (MICRO-K) 10 MEQ CR capsule; Take 2 capsules (20 mEq total) by mouth 2 (two) times a day    History of ST elevation myocardial infarction (STEMI)  -     potassium chloride (MICRO-K) 10 MEQ CR capsule; Take 2 capsules (20 mEq total) by mouth 2 (two) times a day    Stage 3b chronic kidney disease (CKD) ([facility])          Subjective:      Patient ID: Tian Garcia is a 74 y.o. male.      HPI    Patient presents for follow-up of chronic conditions as detailed in the assessment and plan.      The following portions of the patient's history were reviewed and updated as appropriate: current medications, past family history, past medical history, past social history, past surgical history and problem list.    Review of Systems      Objective:    BP 92/60 (BP Location: Left arm, Patient Position: Sitting)   Pulse 76   Temp 97.8 °F (36.6 °C) (Tympanic)   Resp 16   Ht 5' 11\" (1.803 m)   Wt 102 kg (224 lb)   SpO2 98%   BMI 31.24 kg/m²      Physical Exam  Constitutional:       General: He is not in acute distress.     Appearance: He is not " toxic-appearing.   HENT:      Head: Normocephalic.   Cardiovascular:      Rate and Rhythm: Normal rate. Rhythm irregular.      Heart sounds: Murmur heard.   Pulmonary:      Effort: No respiratory distress.      Breath sounds: No wheezing or rales.   Musculoskeletal:         General: Tenderness present. No deformity.   Skin:     General: Skin is warm and dry.      Findings: No bruising, erythema or rash.      Comments: He has no lower extremity edema today   Neurological:      Mental Status: He is alert.             Nelly Baker MD

## 2024-07-09 NOTE — ASSESSMENT & PLAN NOTE
Patient complains of right shoulder pain which has been chronic and is worse when he abducts his shoulder and rotates his shoulder.  His range of motion on abduction and external rotation is decreased.    Right shoulder x-ray in December 2023 showed osteoarthritis of the right shoulder.  He was taking tramadol as needed that relieved the discomfort.  Toradol was last prescribed in December.  I reviewed [place]MP    I referred patient to physical therapy for the right shoulder arthritis and I refilled his tramadol.

## 2024-07-09 NOTE — ASSESSMENT & PLAN NOTE
Lab Results   Component Value Date    EGFR 28 (L) 07/02/2024    EGFR 32 (L) 05/21/2024    EGFR 35 04/09/2024    CREATININE 2.42 (H) 07/02/2024    CREATININE 2.15 (H) 05/21/2024    CREATININE 1.85 (H) 04/09/2024     Patient has stage IIIb CKD.  Renal function was slightly worse on July 2.  He denies urinary retention.  I decreased the dose of torsemide to 20 mg daily.  Continue the same dose.  His potassium was low on July 2 and nephrology increased his potassium to 20 mg twice daily.  Repeat BMP was ordered.  I encouraged patient to get it done this week

## 2024-07-09 NOTE — PROGRESS NOTES
Cardiology Office Follow Up  Tian Garcia  1950  [id number]      ASSESSMENT:  Paroxysmal atrial fibrillation  Hypertension  Chronic kidney disease stage IIIb  History of CAD with anterior STEMI April 2023 with subsequent stenting of proximal and mid LAD with 50% residual RCA lesion managed medically  Factor V Leiden deficiency  Moderate aortic stenosis  Chronic AC with warfarin  Ischemic cardiomyopathy    PLAN/ DISCUSSION:  Fatigue, orthostatic hypotension  Symptoms are suggestive of orthostasis  We will decrease torsemide to 10 daily but discussed that he needs to keep a close eye on weight and if he is retaining any fluid we will go back to usual dose of 20 daily  I have asked him to move up his echocardiogram as well  If despite the above measures he continues to feel poorly then we may need to consider DCCV or ischemic evaluation  History of coronary artery disease  Current symptoms are somewhat reminiscent of his prior anginal equivalent (fatigue, dizziness).  If he does not improve with decreasing torsemide and if his echo is unrevealing then he may need an ischemic evaluation  In the interim continue aspirin, statin, Zetia, beta-blocker  Paroxysmal atrial fibrillation  Rhythm is regular on exam today but rate is well-controlled  If continues to have dizziness despite decreasing diuretics then can consider DCCV to NSR  He remains on warfarin for stroke risk reduction  Aortic stenosis  Repeat echo pending  Ischemic cardiomyopathy with recovery of EF  Repeat echo pending    Interval History/ HPI:   Here for an acute visit sent by PCP  For the last few weeks has been having dizziness when going from sitting to standing.  Feels somewhat similar to the symptoms he had before his heart attack but not nearly as severe.  He has been following with PCP and has decreased his diuretics which he said actually helped his symptoms temporarily.  He is now back on the diuretic at a lower dose and says he still has some  "dizziness but it is not as severe as it used to be.  He has no chest pain or chest pressure.  He has not had any episodes of passing out.  Breathing is stable.  No swelling in the legs.     Vitals:  /58 (BP Location: Left arm, Patient Position: Sitting, Cuff Size: Standard)   Pulse 81   Ht 5' 11\" (1.803 m)   Wt 100 kg (221 lb)   BMI 30.82 kg/m²      Past Medical History:   Diagnosis Date    Acute venous embolism and thrombosis of deep vessels of proximal lower extremity (HCC)     Last assessed: 5/18/15    JANEL (acute kidney injury) ([facility])     Arthritis     Cellulitis     LE    Chronic kidney disease 03/2020    Acute Kidney Injury    CPAP (continuous positive airway pressure) dependence     Factor V Leiden ([facility])     Forgetfulness     Gross hematuria 05/17/2022    Headache(784.0) 03/2022    Never till cervical  spine surgery    Heart murmur 03/2020    Told when in hospital    History of transfusion     [place] (hard of hearing)     Hypoalbuminemia 05/11/2022    Other acute osteomyelitis, other site ([facility]) 01/03/2023    Paroxysmal atrial fibrillation ([facility])     Last assessed: 11/2/15    Sleep apnea      Social History     Socioeconomic History    Marital status: /Civil Union     Spouse name: Elsy    Number of children: 4    Years of education: Not on file    Highest education level: Not on file   Occupational History    Occupation: Retired   Tobacco Use    Smoking status: Never    Smokeless tobacco: Never   Vaping Use    Vaping status: Never Used   Substance and Sexual Activity    Alcohol use: Not Currently    Drug use: No    Sexual activity: Not Currently     Partners: Female   Other Topics Concern    Not on file   Social History Narrative    Caffeine use: 2 cups coffee a day     Social Determinants of Health     Financial Resource Strain: Medium Risk (2/7/2024)    Overall Financial Resource Strain (CARDIA)     Difficulty of Paying Living Expenses: Somewhat hard   Food Insecurity: No Food Insecurity " (1/3/2023)    Hunger Vital Sign     Worried About Running Out of Food in the Last Year: Never true     Ran Out of Food in the Last Year: Never true   Transportation Needs: No Transportation Needs (2/7/2024)    PRAPARE - Transportation     Lack of Transportation (Medical): No     Lack of Transportation (Non-Medical): No   Physical Activity: Not on file   Stress: Not on file   Social Connections: Not on file   Intimate Partner Violence: Not on file   Housing Stability: Low Risk  (5/11/2022)    Housing Stability Vital Sign     Unable to Pay for Housing in the Last Year: No     Number of Places Lived in the Last Year: 1     Unstable Housing in the Last Year: No      Family History   Problem Relation Age of Onset    Lung cancer Mother     Hearing loss Father     Emphysema Sister     Heart attack Brother     Atrial fibrillation Brother     Clotting disorder Son      Past Surgical History:   Procedure Laterality Date    BACK SURGERY      Lower    CARDIAC CATHETERIZATION N/A 04/09/2023    Procedure: Cardiac pci;  Surgeon: Bird Cast MD;  [facility];  Service: Cardiology    CARDIAC CATHETERIZATION N/A 04/09/2023    Procedure: Cardiac Coronary Angiogram;  Surgeon: Bird Cast MD;  [facility];  Service: Cardiology    CATARACT EXTRACTION      COLON SURGERY      COLONOSCOPY      INCISION AND DRAINAGE POSTERIOR SPINE N/A 04/01/2022    Procedure: Posterior cervical evacuation of postoperative collection and debridement with placement of drains C3-T1;  Surgeon: Rajeev Garcia MD;  [facility];  Service: Neurosurgery    IR BIOPSY KIDNEY RANDOM  05/26/2022    IR TUNNELED DIALYSIS CATHETER PLACEMENT  05/23/2022    IR TUNNELED DIALYSIS CATHETER REMOVAL  06/02/2022    FL ARTHRD ANT INTERBODY MIN DSC CRV BELOW C2 N/A 03/11/2022    Procedure: Anterior cervical discectomy and fixation fusion C5/6 and C6/7; Posterior cervical decompression and instrumented fusion C3-T1;   Surgeon: Rajeev Garcia MD;  [facility];  Service: Neurosurgery    CT ARTHRP KNE CONDYLE&PLATU MEDIAL&LAT COMPARTMENTS Right 4/8/2024    Procedure: ARTHROPLASTY KNEE TOTAL and all associated procedures;  Surgeon: Dot Galindo MD;  [facility];  Service: Orthopedics    RENAL BIOPSY  6/2022    SPINE SURGERY  03/11/2022    Cervical myelopathy/ cervical fusion       Current Outpatient Medications:     Aspirin 81 MG CAPS, Take by mouth, Disp: , Rfl:     atorvastatin (LIPITOR) 80 mg tablet, TAKE 1 TABLET BY MOUTH EVERY EVENING, Disp: 90 tablet, Rfl: 1    DULoxetine (CYMBALTA) 60 mg delayed release capsule, TAKE 1 CAPSULE BY MOUTH EVERY DAY, Disp: 90 capsule, Rfl: 1    ezetimibe (ZETIA) 10 mg tablet, TAKE 1 TABLET BY MOUTH EVERY DAY, Disp: 90 tablet, Rfl: 3    gabapentin (NEURONTIN) 300 mg capsule, TAKE 1 CAPSULE BY MOUTH DAILY AT BEDTIME, Disp: 90 capsule, Rfl: 1    glucose blood (Accu-Chek Guide) test strip, Use one new strip daily dx r73.01, Disp: 100 strip, Rfl: 1    metoprolol succinate (TOPROL-XL) 25 mg 24 hr tablet, Take 1 tablet (25 mg total) by mouth daily, Disp: 90 tablet, Rfl: 1    mometasone (ELOCON) 0.1 % cream, APPLY TO AFFECTED AREA TOPICALLY EVERY DAY, Disp: 45 g, Rfl: 1    nitroglycerin (NITROSTAT) 0.4 mg SL tablet, Place 1 tablet (0.4 mg total) under the tongue every 5 (five) minutes as needed for chest pain, Disp: 30 tablet, Rfl: 0    potassium chloride (MICRO-K) 10 MEQ CR capsule, Take 2 capsules (20 mEq total) by mouth 2 (two) times a day, Disp: 180 capsule, Rfl: 1    tamsulosin (FLOMAX) 0.4 mg, TAKE 1 CAPSULE BY MOUTH DAILY  WITH DINNER, Disp: 90 capsule, Rfl: 1    tirzepatide (Mounjaro) 2.5 MG/0.5ML, INJECT 0.5 ML (2.5 MG TOTAL) UNDER THE SKIN EVERY 7 DAYS, Disp: 2 mL, Rfl: 1    torsemide (DEMADEX) 20 mg tablet, Take 1 tablet (20 mg total) by mouth every morning, Disp: 90 tablet, Rfl: 1    traMADol (Ultram) 50 mg tablet, Take 1 tablet (50 mg total) by mouth 2 (two)  times a day as needed for moderate pain, Disp: 60 tablet, Rfl: 0    warfarin (COUMADIN) 5 mg tablet, TAKE ONE-HALF TABLET BY MOUTH  DAILY , EXCEPT TAKE 1 TABLET BY  MOUTH ON WEDNESDAY AND SATURDAY, Disp: 58 tablet, Rfl: 3    zolpidem (AMBIEN) 10 mg tablet, Take 1 tablet (10 mg total) by mouth daily at bedtime as needed for sleep, Disp: 90 tablet, Rfl: 0    oxyCODONE (ROXICODONE) 5 immediate release tablet, Take 1 tablets every 6 hrs as needed for pain control (Patient not taking: Reported on 7/9/2024), Disp: 10 tablet, Rfl: 0      Review of Systems:  Review of Systems   Constitutional:  Negative for chills and fever.   HENT:  Negative for ear pain and sore throat.    Eyes:  Negative for pain and visual disturbance.   Respiratory:  Negative for cough and shortness of breath.    Cardiovascular:  Negative for chest pain and palpitations.   Gastrointestinal:  Negative for abdominal pain and vomiting.   Genitourinary:  Negative for dysuria and hematuria.   Musculoskeletal:  Negative for arthralgias and back pain.   Skin:  Negative for color change and rash.   Neurological:  Positive for dizziness. Negative for seizures and syncope.   All other systems reviewed and are negative.        Physical Exam:  Physical Exam  Constitutional:       Appearance: He is well-developed.   HENT:      Head: Normocephalic and atraumatic.   Eyes:      Pupils: Pupils are equal, round, and reactive to light.   Cardiovascular:      Rate and Rhythm: Normal rate. Rhythm irregular.      Heart sounds: No murmur heard.     No friction rub. No gallop.   Pulmonary:      Effort: Pulmonary effort is normal. No respiratory distress.      Breath sounds: Normal breath sounds. No wheezing or rales.   Abdominal:      General: Bowel sounds are normal. There is no distension.      Palpations: Abdomen is soft.      Tenderness: There is no abdominal tenderness.   Musculoskeletal:         General: No deformity. Normal range of motion.      Cervical back: Normal  range of motion and neck supple.   Skin:     General: Skin is warm and dry.   Neurological:      Mental Status: He is alert and oriented to person, place, and time.   Psychiatric:         Behavior: Behavior normal.         This note was completed in part utilizing M-Modal Fluency Direct Software.  Grammatical errors, random word insertions, spelling mistakes, and incomplete sentences can be an occasional consequence of this system secondary to software limitations, ambient noise, and hardware issues.  If you have any questions or concerns about the content, text, or information contained within the body of this dictation, please contact the provider for clarification.

## 2024-07-09 NOTE — ASSESSMENT & PLAN NOTE
Patient has chronic hypertension but today he is hypotensive.  He told me that his blood pressure at home runs between 100-110 but today in the office it was in the 90s.  He had no orthostasis today    His lungs were completely clear to auscultation, he had no signs of fluid overload or dehydration.    I decreased the dose of Toprol to 25 mg daily and he will continue torsemide 20 mg daily.

## 2024-07-10 ENCOUNTER — OFFICE VISIT (OUTPATIENT)
Dept: PHYSICAL THERAPY | Facility: CLINIC | Age: 74
End: 2024-07-10
Payer: COMMERCIAL

## 2024-07-10 ENCOUNTER — HOSPITAL ENCOUNTER (OUTPATIENT)
Dept: RADIOLOGY | Facility: HOSPITAL | Age: 74
Discharge: HOME | End: 2024-07-10
Attending: ORTHOPAEDIC SURGERY
Payer: COMMERCIAL

## 2024-07-10 ENCOUNTER — OFFICE VISIT (OUTPATIENT)
Dept: OBGYN CLINIC | Facility: CLINIC | Age: 74
End: 2024-07-10

## 2024-07-10 VITALS — BODY MASS INDEX: 30.94 KG/M2 | HEIGHT: 71 IN | WEIGHT: 221 LBS

## 2024-07-10 DIAGNOSIS — Z96.651 STATUS POST TOTAL KNEE REPLACEMENT USING CEMENT, RIGHT: ICD-10-CM

## 2024-07-10 DIAGNOSIS — I48.0 PAROXYSMAL A-FIB (HCC): ICD-10-CM

## 2024-07-10 DIAGNOSIS — M17.11 PRIMARY OSTEOARTHRITIS OF RIGHT KNEE: Primary | ICD-10-CM

## 2024-07-10 DIAGNOSIS — Z01.818 PREOP TESTING: ICD-10-CM

## 2024-07-10 DIAGNOSIS — G95.9 CERVICAL MYELOPATHY (HCC): ICD-10-CM

## 2024-07-10 DIAGNOSIS — R26.2 AMBULATORY DYSFUNCTION: ICD-10-CM

## 2024-07-10 DIAGNOSIS — I73.9 PERIPHERAL VASCULAR DISEASE, UNSPECIFIED (HCC): ICD-10-CM

## 2024-07-10 DIAGNOSIS — Z96.651 STATUS POST TOTAL KNEE REPLACEMENT USING CEMENT, RIGHT: Primary | ICD-10-CM

## 2024-07-10 DIAGNOSIS — M25.562 LEFT KNEE PAIN, UNSPECIFIED CHRONICITY: ICD-10-CM

## 2024-07-10 DIAGNOSIS — M17.12 ARTHRITIS OF LEFT KNEE: ICD-10-CM

## 2024-07-10 PROCEDURE — 73562 X-RAY EXAM OF KNEE 3: CPT

## 2024-07-10 PROCEDURE — 97150 GROUP THERAPEUTIC PROCEDURES: CPT

## 2024-07-10 PROCEDURE — 97110 THERAPEUTIC EXERCISES: CPT

## 2024-07-10 PROCEDURE — 97112 NEUROMUSCULAR REEDUCATION: CPT

## 2024-07-10 PROCEDURE — 99024 POSTOP FOLLOW-UP VISIT: CPT | Performed by: ORTHOPAEDIC SURGERY

## 2024-07-10 RX ORDER — CHLORHEXIDINE GLUCONATE ORAL RINSE 1.2 MG/ML
15 SOLUTION DENTAL ONCE
OUTPATIENT
Start: 2024-07-10 | End: 2024-07-10

## 2024-07-10 RX ORDER — CHLORHEXIDINE GLUCONATE 40 MG/ML
SOLUTION TOPICAL DAILY PRN
OUTPATIENT
Start: 2024-07-10

## 2024-07-10 RX ORDER — FERROUS SULFATE 324(65)MG
324 TABLET, DELAYED RELEASE (ENTERIC COATED) ORAL
Qty: 60 TABLET | Refills: 1 | Status: SHIPPED | OUTPATIENT
Start: 2024-07-10

## 2024-07-10 RX ORDER — FOLIC ACID 1 MG/1
1 TABLET ORAL DAILY
Qty: 30 TABLET | Refills: 1 | Status: SHIPPED | OUTPATIENT
Start: 2024-07-10

## 2024-07-10 RX ORDER — ASCORBIC ACID 500 MG
500 TABLET ORAL 2 TIMES DAILY
Qty: 60 TABLET | Refills: 1 | Status: SHIPPED | OUTPATIENT
Start: 2024-07-10

## 2024-07-10 RX ORDER — SODIUM CHLORIDE, SODIUM LACTATE, POTASSIUM CHLORIDE, CALCIUM CHLORIDE 600; 310; 30; 20 MG/100ML; MG/100ML; MG/100ML; MG/100ML
125 INJECTION, SOLUTION INTRAVENOUS CONTINUOUS
OUTPATIENT
Start: 2024-07-10

## 2024-07-10 RX ORDER — ACETAMINOPHEN 325 MG/1
975 TABLET ORAL ONCE
OUTPATIENT
Start: 2024-07-10 | End: 2024-07-10

## 2024-07-10 NOTE — PROGRESS NOTES
Orthopedics          Tian Garcia 74 y.o. male MRN: [id number]      Chief Complaint:   left knee pain, status post 3 months right total knee arthroplasty    HPI:   74 y.o.male complaining of left knee pain and status post 3 months right knee arthroplasty.  Patient states that his right knee is doing very well at this time he has no pain or issues guarding his right knee states he is doing very well at home exercise.  Patient does complain of pain in his left knee.  Patient has been diagnosed with left knee pain due to arthritis and has failed conservative management.  Localized the pain to his left knee is aching nature worse with mildly relieved with rest.  Significant limit his activities of daily living due to his left knee pain he and his with the states of a cane due to his left knee pain from arthritis.  He is limited with anti-inflammatories due to his other medications denies any change in numbness tingling left lower extremity.        Review Of Systems:   Skin: Normal  Neuro: See HPI  Musculoskeletal: See HPI  All other systems reviewed and are negative    Past Medical History:   Past Medical History:   Diagnosis Date    Acute venous embolism and thrombosis of deep vessels of proximal lower extremity ([facility])     Last assessed: 5/18/15    JANEL (acute kidney injury) ([facility])     Arthritis     Cellulitis     LE    Chronic kidney disease 03/2020    Acute Kidney Injury    CPAP (continuous positive airway pressure) dependence     Factor V Leiden ([facility])     Forgetfulness     Gross hematuria 05/17/2022    Headache(784.0) 03/2022    Never till cervical  spine surgery    Heart murmur 03/2020    Told when in hospital    History of transfusion     [place] (hard of hearing)     Hypoalbuminemia 05/11/2022    Other acute osteomyelitis, other site ([facility]) 01/03/2023    Paroxysmal atrial fibrillation ([facility])     Last assessed: 11/2/15    Sleep apnea        Past Surgical History:   Past Surgical History:   Procedure Laterality Date     BACK SURGERY      Lower    CARDIAC CATHETERIZATION N/A 04/09/2023    Procedure: Cardiac pci;  Surgeon: Bird Cast MD;  [facility];  Service: Cardiology    CARDIAC CATHETERIZATION N/A 04/09/2023    Procedure: Cardiac Coronary Angiogram;  Surgeon: Bird Cast MD;  [facility];  Service: Cardiology    CATARACT EXTRACTION      COLON SURGERY      COLONOSCOPY      INCISION AND DRAINAGE POSTERIOR SPINE N/A 04/01/2022    Procedure: Posterior cervical evacuation of postoperative collection and debridement with placement of drains C3-T1;  Surgeon: Rajeev Garcia MD;  [facility];  Service: Neurosurgery    IR BIOPSY KIDNEY RANDOM  05/26/2022    IR TUNNELED DIALYSIS CATHETER PLACEMENT  05/23/2022    IR TUNNELED DIALYSIS CATHETER REMOVAL  06/02/2022    WI ARTHRD ANT INTERBODY MIN DSC CRV BELOW C2 N/A 03/11/2022    Procedure: Anterior cervical discectomy and fixation fusion C5/6 and C6/7; Posterior cervical decompression and instrumented fusion C3-T1;  Surgeon: Rajeev Garcia MD;  [facility];  Service: Neurosurgery    WI ARTHRP KNE CONDYLE&PLATU MEDIAL&LAT COMPARTMENTS Right 4/8/2024    Procedure: ARTHROPLASTY KNEE TOTAL and all associated procedures;  Surgeon: Dot Galindo MD;  [facility];  Service: Orthopedics    RENAL BIOPSY  6/2022    SPINE SURGERY  03/11/2022    Cervical myelopathy/ cervical fusion       Family History:  Family history reviewed and non-contributory  Family History   Problem Relation Age of Onset    Lung cancer Mother     Hearing loss Father     Emphysema Sister     Heart attack Brother     Atrial fibrillation Brother     Clotting disorder Son          Social History:  Social History     Socioeconomic History    Marital status: /Civil Union     Spouse name: Elsy    Number of children: 4    Years of education: None    Highest education level: None   Occupational History    Occupation: Retired   Tobacco  Use    Smoking status: Never    Smokeless tobacco: Never   Vaping Use    Vaping status: Never Used   Substance and Sexual Activity    Alcohol use: Not Currently    Drug use: No    Sexual activity: Not Currently     Partners: Female   Other Topics Concern    None   Social History Narrative    Caffeine use: 2 cups coffee a day     Social Determinants of Health     Financial Resource Strain: Medium Risk (2/7/2024)    Overall Financial Resource Strain (CARDIA)     Difficulty of Paying Living Expenses: Somewhat hard   Food Insecurity: No Food Insecurity (1/3/2023)    Hunger Vital Sign     Worried About Running Out of Food in the Last Year: Never true     Ran Out of Food in the Last Year: Never true   Transportation Needs: No Transportation Needs (2/7/2024)    PRAPARE - Transportation     Lack of Transportation (Medical): No     Lack of Transportation (Non-Medical): No   Physical Activity: Not on file   Stress: Not on file   Social Connections: Not on file   Intimate Partner Violence: Not on file   Housing Stability: Low Risk  (5/11/2022)    Housing Stability Vital Sign     Unable to Pay for Housing in the Last Year: No     Number of Places Lived in the Last Year: 1     Unstable Housing in the Last Year: No       Allergies:   Allergies   Allergen Reactions    Morphine GI Intolerance    Vitamin K Other (See Comments)     On coumadin-patient sensitive to vitamin K amounts in meds etc.       Labs:  0   Lab Value Date/Time    HCT 41.9 07/02/2024 1024    HCT 32.3 (L) 04/09/2024 0536    HCT 37.9 03/13/2024 1054    HCT 36.7 (L) 01/03/2024 1028    HCT 29 05/22/2023 0000    HCT 28 05/04/2023 0000    HCT 27.9 (L) 04/19/2023 0538    HCT 30.3 (L) 04/18/2023 1151    HCT 39.5 01/23/2015 0440    HCT 40.7 01/22/2015 0610    HCT 43.4 01/21/2015 2022    HGB 13.6 07/02/2024 1024    HGB 10.1 (L) 04/09/2024 0536    HGB 12.6 (L) 03/13/2024 1054    HGB 12.7 (L) 01/03/2024 1028    HGB 9.5 05/22/2023 0000    HGB 9.3 05/04/2023 0000    HGB 9.0 (L)  04/19/2023 0538    HGB 9.7 (L) 04/18/2023 1151    HGB 12.9 01/23/2015 0440    HGB 13.4 01/22/2015 0610    HGB 15.0 01/21/2015 2042    HGB 14.4 01/21/2015 2022    INR 4.9 (H) 07/02/2024 1025    INR 1.15 04/09/2024 0536    INR 2.2 (H) 01/11/2018 1405    WBC 6.0 07/02/2024 1024    WBC 13.31 (H) 04/09/2024 0536    WBC 5.2 03/13/2024 1054    WBC 6.0 01/03/2024 1028    WBC 5.1 05/22/2023 0000    WBC 5.0 05/04/2023 0000    WBC 7.40 04/19/2023 0538    WBC 6.77 04/18/2023 1151    WBC 8.43 01/23/2015 0440    WBC 12.60 (H) 01/22/2015 0610    WBC 14.04 (H) 01/21/2015 2022     (H) 05/10/2022 1949    CRP 8.2 (H) 05/10/2022 1950       Meds:    Current Outpatient Medications:     Aspirin 81 MG CAPS, Take by mouth, Disp: , Rfl:     atorvastatin (LIPITOR) 80 mg tablet, TAKE 1 TABLET BY MOUTH EVERY EVENING, Disp: 90 tablet, Rfl: 1    DULoxetine (CYMBALTA) 60 mg delayed release capsule, TAKE 1 CAPSULE BY MOUTH EVERY DAY, Disp: 90 capsule, Rfl: 1    ezetimibe (ZETIA) 10 mg tablet, TAKE 1 TABLET BY MOUTH EVERY DAY, Disp: 90 tablet, Rfl: 3    gabapentin (NEURONTIN) 300 mg capsule, TAKE 1 CAPSULE BY MOUTH DAILY AT BEDTIME, Disp: 90 capsule, Rfl: 1    glucose blood (Accu-Chek Guide) test strip, Use one new strip daily dx r73.01, Disp: 100 strip, Rfl: 1    metoprolol succinate (TOPROL-XL) 25 mg 24 hr tablet, Take 1 tablet (25 mg total) by mouth daily, Disp: 90 tablet, Rfl: 1    mometasone (ELOCON) 0.1 % cream, APPLY TO AFFECTED AREA TOPICALLY EVERY DAY, Disp: 45 g, Rfl: 1    nitroglycerin (NITROSTAT) 0.4 mg SL tablet, Place 1 tablet (0.4 mg total) under the tongue every 5 (five) minutes as needed for chest pain, Disp: 30 tablet, Rfl: 0    potassium chloride (MICRO-K) 10 MEQ CR capsule, Take 2 capsules (20 mEq total) by mouth 2 (two) times a day, Disp: 180 capsule, Rfl: 1    tamsulosin (FLOMAX) 0.4 mg, TAKE 1 CAPSULE BY MOUTH DAILY  WITH DINNER, Disp: 90 capsule, Rfl: 1    tirzepatide (Mounjaro) 2.5 MG/0.5ML, INJECT 0.5 ML (2.5 MG  TOTAL) UNDER THE SKIN EVERY 7 DAYS, Disp: 2 mL, Rfl: 1    torsemide (DEMADEX) 20 mg tablet, Take 0.5 tablets (10 mg total) by mouth every morning, Disp: , Rfl:     traMADol (Ultram) 50 mg tablet, Take 1 tablet (50 mg total) by mouth 2 (two) times a day as needed for moderate pain, Disp: 60 tablet, Rfl: 0    warfarin (COUMADIN) 5 mg tablet, TAKE ONE-HALF TABLET BY MOUTH  DAILY , EXCEPT TAKE 1 TABLET BY  MOUTH ON WEDNESDAY AND SATURDAY, Disp: 58 tablet, Rfl: 3    zolpidem (AMBIEN) 10 mg tablet, Take 1 tablet (10 mg total) by mouth daily at bedtime as needed for sleep, Disp: 90 tablet, Rfl: 0    oxyCODONE (ROXICODONE) 5 immediate release tablet, Take 1 tablets every 6 hrs as needed for pain control (Patient not taking: Reported on 7/9/2024), Disp: 10 tablet, Rfl: 0    Body mass index is 30.82 kg/m².  Wt Readings from Last 3 Encounters:   07/10/24 100 kg (221 lb)   07/09/24 100 kg (221 lb)   07/09/24 102 kg (224 lb)     Physical Exam:   There were no vitals filed for this visit.    General Appearance:    Alert, cooperative, no distress, appears stated age   Head:    Normocephalic, without obvious abnormality, atraumatic   Eyes:    conjunctiva/corneas clear, both eyes        Nose:   Nares normal, septum midline, no drainage    Throat:   Lips normal; teeth and gums normal   Neck:    symmetrical, trachea midline, ;     thyroid:  no enlargement/   Back:     Symmetric, no curvature, ROM normal   Lungs:   No audible wheezing or labored breathing   Chest Wall:    No tenderness or deformity    Heart:    Regular rate and rhythm               Pulses:   2+ and symmetric all extremities   Skin:   Skin color, texture, turgor normal, no rashes or lesions   Neurologic:   normal strength, sensation and reflexes     throughout       Musculoskeletal: bilateral lower extremity  On examination of the left knee there is no effusion, no erythema, varus deformity.  Range of motion to  full active extension and flexion to greater than 120°.   Pain on palpation medial and lateral joint lines.  There is crepitus with range of motion, no warmth to palpation, bony enlargement noted. No pain on palpation pes anserine bursa region or distal iliotibial band.  Stable to varus and valgus stress without pain or gapping.  Negative anterior and posterior drawer testing.  Sensation intact distal pulses present.  Examination of patient's right knee well-healed anterior incision full active extension flexion greater than 120 degrees stable to varus valgus stress quadriceps strength 5 out of 5 no pain to palpation quadriceps patella tendon active ankle dorsi and flexion calf nontender palpation sensation mildly diminished daniel-incisional he otherwise intact    Radiology:   I personally reviewed the films.  X-rays bilateral knee shows well aligned right total knee arthroplasty without evidence of loosening or osseous abnormality x-rays left knee reveal severe osteoarthritis deformity subchondral sclerosis obliteration of the left knee joint with severe osteoarthritis.    _*_*_*_*_*_*_*_*_*_*_*_*_*_*_*_*_*_*_*_*_*_*_*_*_*_*_*_*_*_*_*_*_*_*_*_*_*_*_*_*_*    Assessment:  74 y.o.male with left knee pain due to arthritis which is failed conservative management and status post 3 months of right total knee arthroplasty doing well    Plan:   Weight bearing as tolerated  bilateral lower extremity  Patient is a candidate for left total knee arthroplasty  The benefits and purpose of the operations and/or procedure have been explained to me in language I understand by Dr. Galindo well as the risks and benefits, alternatives and complications pertaining to the above procedure/surgery which include but is not limited to: infections, deeps vein thrombosis, blood clots to the lungs, wound healing problems, complications from extensive blood loss, including shock, possible death, continued pain, fracture, vascular or nerve injury, potential need for further surgery/revision, persistent  pain/stiffness/instability, failure of hardware,heart attack, stroke and not obtaining results patient used.  Patient will stay 1 night after his left total knee arthroplasty at the [facility]  Patient will resume Coumadin due to his atrial fibrillation postoperatively which also serves as his DVT prophylaxis  Continue physical therapy/home exercise regimen range of motion stretching strengthening  Lifetime antibiotic prophylaxis recommended                          .

## 2024-07-10 NOTE — PROGRESS NOTES
Daily Note     Today's date: 7/10/2024  Patient name: Tian Garcia  : 1950  MRN: [id number]  Referring provider: Dot Galindo MD  Dx:   Encounter Diagnosis     ICD-10-CM    1. Primary osteoarthritis of right knee  M17.11       2. Ambulatory dysfunction  R26.2                      Subjective: Reports continued low BP, but no reports of lightheadedness at start of or during session.      Objective: See treatment diary below   Group: 9:30-9:45  1-1: 9:45-10:15      Assessment: Pt is motivated to participate in session this date, tolerating all interventions fairly well. He reports his BP has continued to be low, but is not experiencing symptoms at any point during today's sessions. Pt demonstrates difficulty maintaining balance with activities involving NBOS and SLS, demonstrating intermittent LOB during these tasks. He is able to self recover from all LOB, though preferentially uses a stepping strategy. He continues to benefit from skilled PT services to address limitations in strength, balance, and functional endurance to maximize functional potential and reduce risk for falls.       Plan: Continue per plan of care.      Short Term Goal Expiration Date:(24)  Long Term Goal Expiration Date: (24)  POC Expiration Date: (24)      POC expires Unit limit Auth Expiration date PT/OT/ST + Visit Limit?   24 bomn 24 bomn     24                      Visit/Unit Tracking  AUTH Status:  Date    approved Used 4 5 6 7 8 1 2 3 4 5 7 8    Remaining  4 3    7 6 5 4 3 1 0         Precautions fall risk   Access Code: [email or web address]  URL: [email or web address]  Date: 2024  Prepared by: Edelmira Hernandez    Exercises  - Supine Bridge  - 1 x daily - 7 x weekly - 3 sets - 10 reps  - Supine Straight Leg Raise  - 1 x daily - 7 x weekly - 3 sets - 10 reps  - Beginner Side Leg Lift  - 1 x daily - 7 x weekly - 3 sets - 10 reps  -  "Clamshell with Resistance  - 1 x daily - 7 x weekly - 3 sets - 10 reps  - Walking March  - 1 x daily - 7 x weekly - 3 sets - 10 reps  - Lateral Step Up  - 1 x daily - 7 x weekly - 3 sets - 10 reps  - Step Up  - 1 x daily - 7 x weekly - 3 sets - 10 reps  - Standard Lunge  - 1 x daily - 7 x weekly - 3 sets - 10 reps  - Forward Step Down with Heel Tap and Counter Support  - 1 x daily - 7 x weekly - 3 sets - 10 reps    Manuals 6/28 7/1 7/5 7/10 6/25                                   Neuro Re-Ed         Sit to stand        Step ups  8\" in //bars with 4# aw fwd 15 ea, 15 x lat with R LE only   8\" step fwd 15x ea    Heel tap 3x10 ea 6\" stpe        TKE        Backwards walking                tandem   Walking 5 laps no UE //bars In SOLO: x5 laps    HKM 5 laps //bars no UE 3 laps //bars      Hurdles   3  laps ea fwd/lat no UE //bars  In SOLO: x4 laps fwd, x4 laps lat    Cone taps         Ther Ex        treadmill   10 min with 4% incline  - 1.6 mph X10 min at 1.6 mph at 4% incline    NUstep  10 min with L 8 resistance - CV and muscular endurance      Heel raise   30x b/l LE     Glut sets        lunges 5 laps //bars with UE  5 laps //bars min UE X5 laps in // bars with minimal UE support 3 laps //bars with UE            Supine Heel slide        Supine Quad set        LAQ        Supine hip abd        SAQ        SLR        Ther Activity                        Gait Training        No AD        outdoors        Modalities                                                                                          "

## 2024-07-11 ENCOUNTER — HOSPITAL ENCOUNTER (OUTPATIENT)
Dept: RADIOLOGY | Facility: HOSPITAL | Age: 74
Discharge: HOME | End: 2024-07-11
Attending: ORTHOPAEDIC SURGERY

## 2024-07-11 ENCOUNTER — OFFICE VISIT (OUTPATIENT)
Dept: OBGYN CLINIC | Facility: CLINIC | Age: 74
End: 2024-07-11
Payer: COMMERCIAL

## 2024-07-11 VITALS
HEIGHT: 71 IN | WEIGHT: 221 LBS | DIASTOLIC BLOOD PRESSURE: 70 MMHG | SYSTOLIC BLOOD PRESSURE: 111 MMHG | HEART RATE: 67 BPM | BODY MASS INDEX: 30.94 KG/M2

## 2024-07-11 DIAGNOSIS — M19.011 PRIMARY OSTEOARTHRITIS OF RIGHT SHOULDER: ICD-10-CM

## 2024-07-11 DIAGNOSIS — M25.511 RIGHT SHOULDER PAIN, UNSPECIFIED CHRONICITY: ICD-10-CM

## 2024-07-11 DIAGNOSIS — M25.511 RIGHT SHOULDER PAIN, UNSPECIFIED CHRONICITY: Primary | ICD-10-CM

## 2024-07-11 PROCEDURE — 99214 OFFICE O/P EST MOD 30 MIN: CPT | Performed by: ORTHOPAEDIC SURGERY

## 2024-07-11 PROCEDURE — 20610 DRAIN/INJ JOINT/BURSA W/O US: CPT | Performed by: ORTHOPAEDIC SURGERY

## 2024-07-11 RX ORDER — TRIAMCINOLONE ACETONIDE 40 MG/ML
40 INJECTION, SUSPENSION INTRA-ARTICULAR; INTRAMUSCULAR
Status: COMPLETED | OUTPATIENT
Start: 2024-07-11 | End: 2024-07-11

## 2024-07-11 RX ORDER — BUPIVACAINE HYDROCHLORIDE 2.5 MG/ML
4 INJECTION, SOLUTION INFILTRATION; PERINEURAL
Status: COMPLETED | OUTPATIENT
Start: 2024-07-11 | End: 2024-07-11

## 2024-07-11 RX ADMIN — TRIAMCINOLONE ACETONIDE 40 MG: 40 INJECTION, SUSPENSION INTRA-ARTICULAR; INTRAMUSCULAR at 11:15

## 2024-07-11 RX ADMIN — BUPIVACAINE HYDROCHLORIDE 4 ML: 2.5 INJECTION, SOLUTION INFILTRATION; PERINEURAL at 11:15

## 2024-07-11 NOTE — ASSESSMENT & PLAN NOTE
Patient has history of left lower extremity DVT after which he was diagnosed with factor 5 Leiden mutation  This happened years ago  Detail Level: Detailed Quality 226: Preventive Care And Screening: Tobacco Use: Screening And Cessation Intervention: Patient screened for tobacco use and is an ex/non-smoker Quality 431: Preventive Care And Screening: Unhealthy Alcohol Use - Screening: Patient not identified as an unhealthy alcohol user when screened for unhealthy alcohol use using a systematic screening method Quality 130: Documentation Of Current Medications In The Medical Record: Current Medications Documented Quality 47: Advance Care Plan: Advance Care Planning discussed and documented in the medical record; patient did not wish or was not able to name a surrogate decision maker or provide an advance care plan.

## 2024-07-11 NOTE — PROGRESS NOTES
[facility] [address] [id number] [phone]       Tian KARLOS Garica  [id number]  1950    ORTHOPAEDIC SURGERY OUTPATIENT NOTE  7/11/2024      HISTORY:  74 y.o. male  referred to me by Dr. Galindo with right shoulder pain. He has daily pain while driving, sitting in the chair, lifting his arm. Pain does wake him up at night. He is right handed. No previous treatments to his shoulder. The patient has a history of cervical spine fusion C5-6 and C6-7.       Past Medical History:   Diagnosis Date   • Acute venous embolism and thrombosis of deep vessels of proximal lower extremity ([facility])     Last assessed: 5/18/15   • JANEL (acute kidney injury) ([facility])    • Arthritis    • Cellulitis     LE   • Chronic kidney disease 03/2020    Acute Kidney Injury   • CPAP (continuous positive airway pressure) dependence    • Factor V Leiden ([facility])    • Forgetfulness    • Gross hematuria 05/17/2022   • Headache(784.0) 03/2022    Never till cervical  spine surgery   • Heart murmur 03/2020    Told when in hospital   • History of transfusion    • Pala (hard of hearing)    • Hypoalbuminemia 05/11/2022   • Other acute osteomyelitis, other site ([facility]) 01/03/2023   • Paroxysmal atrial fibrillation ([facility])     Last assessed: 11/2/15   • Sleep apnea        Past Surgical History:   Procedure Laterality Date   • BACK SURGERY      Lower   • CARDIAC CATHETERIZATION N/A 04/09/2023    Procedure: Cardiac pci;  Surgeon: Bird Cast MD;  [facility];  Service: Cardiology   • CARDIAC CATHETERIZATION N/A 04/09/2023    Procedure: Cardiac Coronary Angiogram;  Surgeon: Bird Cast MD;  [facility];  Service: Cardiology   • CATARACT EXTRACTION     • COLON SURGERY     • COLONOSCOPY     • INCISION AND DRAINAGE POSTERIOR SPINE N/A 04/01/2022    Procedure: Posterior cervical evacuation of postoperative collection and debridement with  placement of drains C3-T1;  Surgeon: Rajeev Garcia MD;  Location: [facility];  Service: Neurosurgery   • IR BIOPSY KIDNEY RANDOM  05/26/2022   • IR TUNNELED DIALYSIS CATHETER PLACEMENT  05/23/2022   • IR TUNNELED DIALYSIS CATHETER REMOVAL  06/02/2022   • MA ARTHRD ANT INTERBODY MIN DSC CRV BELOW C2 N/A 03/11/2022    Procedure: Anterior cervical discectomy and fixation fusion C5/6 and C6/7; Posterior cervical decompression and instrumented fusion C3-T1;  Surgeon: Rajeev Garcia MD;  Location: [facility];  Service: Neurosurgery   • MA ARTHRP KNE CONDYLE&PLATU MEDIAL&LAT COMPARTMENTS Right 4/8/2024    Procedure: ARTHROPLASTY KNEE TOTAL and all associated procedures;  Surgeon: Dot Galindo MD;  Location: [facility];  Service: Orthopedics   • RENAL BIOPSY  6/2022   • SPINE SURGERY  03/11/2022    Cervical myelopathy/ cervical fusion       Social History     Socioeconomic History   • Marital status: /Civil Union     Spouse name: Elsy   • Number of children: 4   • Years of education: Not on file   • Highest education level: Not on file   Occupational History   • Occupation: Retired   Tobacco Use   • Smoking status: Never   • Smokeless tobacco: Never   Vaping Use   • Vaping status: Never Used   Substance and Sexual Activity   • Alcohol use: Not Currently   • Drug use: No   • Sexual activity: Not Currently     Partners: Female   Other Topics Concern   • Not on file   Social History Narrative    Caffeine use: 2 cups coffee a day     Social Determinants of Health     Financial Resource Strain: Medium Risk (2/7/2024)    Overall Financial Resource Strain (CARDIA)    • Difficulty of Paying Living Expenses: Somewhat hard   Food Insecurity: No Food Insecurity (1/3/2023)    Hunger Vital Sign    • Worried About Running Out of Food in the Last Year: Never true    • Ran Out of Food in the Last Year: Never true   Transportation Needs: No Transportation Needs (2/7/2024)    PRAPARE - Transportation    • Lack  of Transportation (Medical): No    • Lack of Transportation (Non-Medical): No   Physical Activity: Not on file   Stress: Not on file   Social Connections: Not on file   Intimate Partner Violence: Not on file   Housing Stability: Low Risk  (5/11/2022)    Housing Stability Vital Sign    • Unable to Pay for Housing in the Last Year: No    • Number of Places Lived in the Last Year: 1    • Unstable Housing in the Last Year: No       Family History   Problem Relation Age of Onset   • Lung cancer Mother    • Hearing loss Father    • Emphysema Sister    • Heart attack Brother    • Atrial fibrillation Brother    • Clotting disorder Son         Patient's Medications   New Prescriptions    No medications on file   Previous Medications    ASCORBIC ACID (VITAMIN C) 500 MG TABLET    Take 1 tablet (500 mg total) by mouth 2 (two) times a day    ASPIRIN 81 MG CAPS    Take by mouth    ATORVASTATIN (LIPITOR) 80 MG TABLET    TAKE 1 TABLET BY MOUTH EVERY EVENING    DULOXETINE (CYMBALTA) 60 MG DELAYED RELEASE CAPSULE    TAKE 1 CAPSULE BY MOUTH EVERY DAY    EZETIMIBE (ZETIA) 10 MG TABLET    TAKE 1 TABLET BY MOUTH EVERY DAY    FERROUS SULFATE 324 (65 FE) MG    Take 1 tablet (324 mg total) by mouth daily before breakfast    FOLIC ACID (FOLVITE) 1 MG TABLET    Take 1 tablet (1 mg total) by mouth daily    GABAPENTIN (NEURONTIN) 300 MG CAPSULE    TAKE 1 CAPSULE BY MOUTH DAILY AT BEDTIME    GLUCOSE BLOOD (ACCU-CHEK GUIDE) TEST STRIP    Use one new strip daily dx r73.01    METOPROLOL SUCCINATE (TOPROL-XL) 25 MG 24 HR TABLET    Take 1 tablet (25 mg total) by mouth daily    MOMETASONE (ELOCON) 0.1 % CREAM    APPLY TO AFFECTED AREA TOPICALLY EVERY DAY    NITROGLYCERIN (NITROSTAT) 0.4 MG SL TABLET    Place 1 tablet (0.4 mg total) under the tongue every 5 (five) minutes as needed for chest pain    OXYCODONE (ROXICODONE) 5 IMMEDIATE RELEASE TABLET    Take 1 tablets every 6 hrs as needed for pain control    POTASSIUM CHLORIDE (MICRO-K) 10 MEQ CR  "CAPSULE    Take 2 capsules (20 mEq total) by mouth 2 (two) times a day    TAMSULOSIN (FLOMAX) 0.4 MG    TAKE 1 CAPSULE BY MOUTH DAILY  WITH DINNER    TIRZEPATIDE (MOUNJARO) 2.5 MG/0.5ML    INJECT 0.5 ML (2.5 MG TOTAL) UNDER THE SKIN EVERY 7 DAYS    TORSEMIDE (DEMADEX) 20 MG TABLET    Take 0.5 tablets (10 mg total) by mouth every morning    TRAMADOL (ULTRAM) 50 MG TABLET    Take 1 tablet (50 mg total) by mouth 2 (two) times a day as needed for moderate pain    WARFARIN (COUMADIN) 5 MG TABLET    TAKE ONE-HALF TABLET BY MOUTH  DAILY , EXCEPT TAKE 1 TABLET BY  MOUTH ON WEDNESDAY AND SATURDAY    ZOLPIDEM (AMBIEN) 10 MG TABLET    Take 1 tablet (10 mg total) by mouth daily at bedtime as needed for sleep   Modified Medications    No medications on file   Discontinued Medications    No medications on file       Allergies   Allergen Reactions   • Morphine GI Intolerance   • Vitamin K Other (See Comments)     On coumadin-patient sensitive to vitamin K amounts in meds etc.        /70 (BP Location: Left arm, Patient Position: Sitting, Cuff Size: Standard)   Pulse 67   Ht 5' 11\" (1.803 m)   Wt 100 kg (221 lb)   BMI 30.82 kg/m²      REVIEW OF SYSTEMS:  Constitutional: Negative.    HEENT: Negative.    Respiratory: Negative.    Skin: Negative.    Neurological: Negative.    Psychiatric/Behavioral: Negative.  Musculoskeletal: Negative except for that mentioned in the HPI.    PHYSICAL EXAM:     LEFT SHOULDER:     NVI axillary/medial/radial/ulnar and sensory intact     Forward flexion:   180 degrees   Abduction:  180 degrees   External rotation at 90 degrees abduction:   90 degrees   Internal rotation at 90 degrees abduction:  90 degrees   External rotation at 0 degrees:   70 degrees   Internal rotation: T7     STRENGTH:  Forward flexion:  5/5   Abduction:  5/5   External rotation:  5/5   Internal rotation:  5/5        Speed test: Negative  Yergason's: Negative   Tender to palpation ACJ (acromioclavicular joint): Negative "   Tender to palpation LHB (long head of biceps): Negative  Santana test: Negative  Ogle test: Negative  Hornblower's: Negative  Lift off: Negative  Belly press: Negative  Bear hug: Negative  External lag sign: Negative  Cross-body adduction: Negative  Sulcus sign: Negative  Karen's test: Negative  Drop arm test: negative     RIGHT SHOULDER:     NVI axillary/medial/radial/ulnar and sensory intact     Forward flexion:   160 degrees   Abduction:  80 degrees   External rotation at 0 degrees:  60 degrees   Internal rotation: sacrum      STRENGTH:  Forward flexion:  4/5   External rotation:  4/5   Internal rotation:  5/5     Reflexes:  Brachioradialis:  Symmetric bilaterally  Triceps: Symmetric bilaterally  Biceps: Symmetric bilaterally  Patella tendon: Symmetric bilaterally  Achilles tendon: Symmetric bilaterally  Babinski's: Negative  Manohar sign: Negative     No scapular winging or dyskinesis     Capillary refill brisk   Full ROM of elbows bilaterally      Skin:  No ecchymosis/abrasion/erythema/warmth     Cervical spine:   Full rotation, side bending, flexion and extension without radiating pain  Spurling's: Negative    IMAGING:  x-rays of the right shoulder obtained 12/15/2023 demonstrate moderate glenohumeral degenerative changes.    ASSESSMENT AND PLAN: 74 y.o. male  with right shoulder glenohumeral osteoarthritis. X-rays were reviewed in the office today. The patient was offered a corticosteroid injection for their right shoulder. They tolerated the procedure well.  The patient was educated they may have some irritation in the next few days and should rest, ice, elevate and perform gentle range of motion exercises. They were advised the medicine should begin to work in a few days time.  The patient was informed corticosteroid injections can be repeated at the earliest every 3 months. I will see the patient back in approximately 3  months for repeat evaluation.    Large joint arthrocentesis  Universal  "Protocol:  Consent: Verbal consent obtained.  Risks and benefits: risks, benefits and alternatives were discussed  Consent given by: patient  Time out: Immediately prior to procedure a \"time out\" was called to verify the correct patient, procedure, equipment, support staff and site/side marked as required.  Patient understanding: patient states understanding of the procedure being performed  Site marked: the operative site was marked  Patient identity confirmed: verbally with patient  Supporting Documentation  Indications: pain   Procedure Details  Preparation: Patient was prepped and draped in the usual sterile fashion  Needle size: 22 G  Ultrasound guidance: no  Medications administered: 40 mg triamcinolone acetonide 40 mg/mL; 4 mL bupivacaine 0.25 %    Patient tolerance: patient tolerated the procedure well with no immediate complications  Dressing:  Sterile dressing applied            Scribe Attestation    I,:  Savannah Campo am acting as a scribe while in the presence of the attending physician.:       I,:  Efe Carbajal DO personally performed the services described in this documentation    as scribed in my presence.:           "

## 2024-07-12 ENCOUNTER — OFFICE VISIT (OUTPATIENT)
Dept: PHYSICAL THERAPY | Facility: CLINIC | Age: 74
End: 2024-07-12
Payer: COMMERCIAL

## 2024-07-12 DIAGNOSIS — M17.11 PRIMARY OSTEOARTHRITIS OF RIGHT KNEE: Primary | ICD-10-CM

## 2024-07-12 DIAGNOSIS — R26.2 AMBULATORY DYSFUNCTION: ICD-10-CM

## 2024-07-12 PROCEDURE — 97110 THERAPEUTIC EXERCISES: CPT

## 2024-07-12 PROCEDURE — 97112 NEUROMUSCULAR REEDUCATION: CPT

## 2024-07-12 NOTE — PROGRESS NOTES
"Daily Note     Today's date: 2024  Patient name: Tian Garcia  : 1950  MRN: [id number]  Referring provider: Dot Galindo MD  Dx:   Encounter Diagnosis     ICD-10-CM    1. Primary osteoarthritis of right knee  M17.11       2. Ambulatory dysfunction  R26.2           Start Time: 0845  Stop Time: 0930  Total time in clinic (min): 45 minutes    Subjective: Reports that he is doing well. R knee has been feeling really good lately. \"One of my biggest problems is my balance and my hip strength\".  Notes that he hasn't done much this week, his BP has been a little low.       Objective: See treatment diary below      Assessment: Tolerated treatment well. Continues to tolerated treadmill well, able to reduce and foot dragging with verbal cuing and attention to clearance. Patient educated on possibility of DOMS - patient verbalized good understanding of muscle soreness and ability to differentiate between possible pain. With patient reporting lateral hip weakness, introduced some lateral hip strengthening activities that he tolerated well. No increase in any discomfort throughout session. Patient demonstrated fatigue post treatment, exhibited good technique with therapeutic exercises, and would benefit from continued PT      Plan: Continue per plan of care.  Progress treatment as tolerated.       Short Term Goal Expiration Date:(24)  Long Term Goal Expiration Date: (24)  POC Expiration Date: (24)      POC expires Unit limit Auth Expiration date PT/OT/ST + Visit Limit?   24 bomn 24 bomn     24                      Visit/Unit Tracking  AUTH Status:  Date  6/ 6// 7   approved Used 4 5 6 7 8 1 2 3 4 5 7 8 2    Remaining  4 3    7 6 5 4 3 1 0 14         Precautions fall risk   Access Code: [email or web address]  URL: [email or web address]  Date: 2024  Prepared by: Edelmira Hernandez    Exercises  - Supine Bridge  - 1 x daily - " "7 x weekly - 3 sets - 10 reps  - Supine Straight Leg Raise  - 1 x daily - 7 x weekly - 3 sets - 10 reps  - Beginner Side Leg Lift  - 1 x daily - 7 x weekly - 3 sets - 10 reps  - Clamshell with Resistance  - 1 x daily - 7 x weekly - 3 sets - 10 reps  - Walking March  - 1 x daily - 7 x weekly - 3 sets - 10 reps  - Lateral Step Up  - 1 x daily - 7 x weekly - 3 sets - 10 reps  - Step Up  - 1 x daily - 7 x weekly - 3 sets - 10 reps  - Standard Lunge  - 1 x daily - 7 x weekly - 3 sets - 10 reps  - Forward Step Down with Heel Tap and Counter Support  - 1 x daily - 7 x weekly - 3 sets - 10 reps    Manuals 6/28 7/1 7/5 7/10 7/12                                   Neuro Re-Ed         Sit to stand        FTEC foam     3x30\" no UE needed   Step ups  8\" in //bars with 4# aw fwd 15 ea, 15 x lat with R LE only   8\" Fwd and lat 20x ea    Lat step down      6\" x20 ea    Heel tap 3x10 ea 6\" stpe        TKE        Backwards walking                tandem   Walking 5 laps no UE //bars In SOLO: x5 laps    HKM 5 laps //bars no UE 3 laps //bars      Hurdles   3  laps ea fwd/lat no UE //bars  In SOLO: x4 laps fwd, x4 laps lat    Cone taps         Ther Ex        treadmill   10 min with 4% incline  - 1.6 mph X10 min at 1.6 mph at 4% incline X10 min 1.6 mph 4% incline no solo    NUstep  10 min with L 8 resistance - CV and muscular endurance      Heel raise   30x b/l LE     Glut sets        lunges 5 laps //bars with UE  5 laps //bars min UE X5 laps in // bars with minimal UE support X6 laps //bars min UE    Lateral Stepping banded      GTB 3x laps //bars min UE support    Supine Heel slide        Supine Quad set        LAQ        Supine hip abd        SAQ        SLR        Ther Activity                        Gait Training        No AD        outdoors        Modalities                                                                                            "

## 2024-07-15 DIAGNOSIS — R73.01 IFG (IMPAIRED FASTING GLUCOSE): ICD-10-CM

## 2024-07-15 RX ORDER — TIRZEPATIDE 2.5 MG/.5ML
INJECTION, SOLUTION SUBCUTANEOUS
Qty: 2 ML | Refills: 1 | Status: SHIPPED | OUTPATIENT
Start: 2024-07-15

## 2024-07-16 ENCOUNTER — OFFICE VISIT (OUTPATIENT)
Dept: PHYSICAL THERAPY | Facility: CLINIC | Age: 74
End: 2024-07-16
Payer: COMMERCIAL

## 2024-07-16 ENCOUNTER — ANTICOAG VISIT (OUTPATIENT)
Dept: FAMILY MEDICINE CLINIC | Facility: HOSPITAL | Age: 74
End: 2024-07-16

## 2024-07-16 DIAGNOSIS — M17.11 PRIMARY OSTEOARTHRITIS OF RIGHT KNEE: Primary | ICD-10-CM

## 2024-07-16 DIAGNOSIS — R26.2 AMBULATORY DYSFUNCTION: ICD-10-CM

## 2024-07-16 PROCEDURE — 97116 GAIT TRAINING THERAPY: CPT | Performed by: PHYSICAL THERAPIST

## 2024-07-16 PROCEDURE — 97112 NEUROMUSCULAR REEDUCATION: CPT | Performed by: PHYSICAL THERAPIST

## 2024-07-16 NOTE — PROGRESS NOTES
Daily Note     Today's date: 2024  Patient name: Tian Garcia  : 1950  MRN: [id number]  Referring provider: Dot Galindo MD  Dx:   Encounter Diagnosis     ICD-10-CM    1. Primary osteoarthritis of right knee  M17.11       2. Ambulatory dysfunction  R26.2                      Subjective: Patient reports being scheduled for left knee replacement in September and had a right shoulder cortisone injection last week which has not changed pain in shoulder.       Objective: See treatment diary below      Assessment: Pt had increasing knee pain with uneven surfaces this date. He was able to control balance and able to utilize stepping strategies to maintain balance when unsteady. Attempted to avoid full knee extension during stance phase on R LE but when on treadmill was maintaining full extension throughout stance phase.       Plan: Continue per plan of care.      Short Term Goal Expiration Date:(24)  Long Term Goal Expiration Date: (24)  POC Expiration Date: (24)      POC expires Unit limit Auth Expiration date PT/OT/ST + Visit Limit?   24 bomn 24 bomn     24                      Visit/Unit Tracking  AUTH Status:  Date    approved Used 4 5 6 7 8 1 2 3 4 5 7 8 2    Remaining  4 3    7 6 5 4 3 1 0 14     /16                 3                 13                     Precautions fall risk   Access Code: [email or web address]  URL: [email or web address]  Date: 2024  Prepared by: Edelmira Hernandez    Exercises  - Supine Bridge  - 1 x daily - 7 x weekly - 3 sets - 10 reps  - Supine Straight Leg Raise  - 1 x daily - 7 x weekly - 3 sets - 10 reps  - Beginner Side Leg Lift  - 1 x daily - 7 x weekly - 3 sets - 10 reps  - Clamshell with Resistance  - 1 x daily - 7 x weekly - 3 sets - 10 reps  - Walking March  - 1 x daily - 7 x weekly - 3 sets - 10 reps  - Lateral Step Up  - 1 x daily - 7 x weekly - 3 sets - 10 reps  -  "Step Up  - 1 x daily - 7 x weekly - 3 sets - 10 reps  - Standard Lunge  - 1 x daily - 7 x weekly - 3 sets - 10 reps  - Forward Step Down with Heel Tap and Counter Support  - 1 x daily - 7 x weekly - 3 sets - 10 reps    Manuals 7/16  7/5 7/10 7/12                                   Neuro Re-Ed         Sit to stand        FTEC foam     3x30\" no UE needed   Step ups     8\" Fwd and lat 20x ea    Lat step down      6\" x20 ea    Heel tap        TKE        Backwards walking                tandem   Walking 5 laps no UE //bars In SOLO: x5 laps    HKM 4 laps solo step       Hurdles   3  laps ea fwd/lat no UE //bars  In SOLO: x4 laps fwd, x4 laps lat    Cone taps         Ther Ex        treadmill   10 min with 4% incline  - 1.6 mph X10 min at 1.6 mph at 4% incline X10 min 1.6 mph 4% incline no solo    NUstep        Heel raise   30x b/l LE     Glut sets        lunges   5 laps //bars min UE X5 laps in // bars with minimal UE support X6 laps //bars min UE    Lateral Stepping banded      GTB 3x laps //bars min UE support    Supine Heel slide        Supine Quad set        LAQ        Supine hip abd        SAQ        SLR        Ther Activity                        Gait Training        No AD        outdoors        Modalities                                                                                              "

## 2024-07-18 ENCOUNTER — TELEPHONE (OUTPATIENT)
Age: 74
End: 2024-07-18

## 2024-07-19 ENCOUNTER — OFFICE VISIT (OUTPATIENT)
Dept: PHYSICAL THERAPY | Facility: CLINIC | Age: 74
End: 2024-07-19
Payer: COMMERCIAL

## 2024-07-19 DIAGNOSIS — R26.2 AMBULATORY DYSFUNCTION: ICD-10-CM

## 2024-07-19 DIAGNOSIS — M17.11 PRIMARY OSTEOARTHRITIS OF RIGHT KNEE: Primary | ICD-10-CM

## 2024-07-19 PROCEDURE — 97112 NEUROMUSCULAR REEDUCATION: CPT | Performed by: PHYSICAL THERAPIST

## 2024-07-19 PROCEDURE — 97110 THERAPEUTIC EXERCISES: CPT | Performed by: PHYSICAL THERAPIST

## 2024-07-19 PROCEDURE — 98975 RTM 1ST SET-UP&PT EDUCAJ EQP: CPT | Performed by: PHYSICAL THERAPIST

## 2024-07-19 NOTE — PROGRESS NOTES
Daily Note     Today's date: 2024  Patient name: Tian Garcia  : 1950  MRN: [id number]  Referring provider: Dot Galindo MD  Dx:   No diagnosis found.                 Subjective: Doing well today! No new c/o. Says he has been working on some eyes closed static balance exercises at home and feels his balance is getting better.       Objective: See treatment diary below      Assessment: Assisted patient in set up and educaiton myla campbell of his therapeutic monitoring device. Downloaded Darian and assisted patient discover how to log and record       Plan: Continue per plan of care.      Short Term Goal Expiration Date:(24)  Long Term Goal Expiration Date: (24)  POC Expiration Date: (24)      POC expires Unit limit Auth Expiration date PT/OT/ST + Visit Limit?   24 bomn 24 bomn     24                      Visit/Unit Tracking  AUTH Status:  Date    approved Used 4 5 6 7 8 1 2 3 4 5 7 8 2    Remaining  4 3    7 6 5 4 3 1 0 14                     3 4                13 12                    Precautions fall risk   Access Code: [email or web address]  URL: [email or web address]  Date: 2024  Prepared by: Edelmira Hernandez    Exercises  - Supine Bridge  - 1 x daily - 7 x weekly - 3 sets - 10 reps  - Supine Straight Leg Raise  - 1 x daily - 7 x weekly - 3 sets - 10 reps  - Beginner Side Leg Lift  - 1 x daily - 7 x weekly - 3 sets - 10 reps  - Clamshell with Resistance  - 1 x daily - 7 x weekly - 3 sets - 10 reps  - Walking March  - 1 x daily - 7 x weekly - 3 sets - 10 reps  - Lateral Step Up  - 1 x daily - 7 x weekly - 3 sets - 10 reps  - Step Up  - 1 x daily - 7 x weekly - 3 sets - 10 reps  - Standard Lunge  - 1 x daily - 7 x weekly - 3 sets - 10 reps  - Forward Step Down with Heel Tap and Counter Support  - 1 x daily - 7 x weekly - 3 sets - 10 reps    Manuals 7/16 7/19 7/5 7/10 7/12                     "               Neuro Re-Ed         Sit to stand        FTEC foam     3x30\" no UE needed   Step ups     8\" Fwd and lat 20x ea    Lat step down      6\" x20 ea    Heel tap        TKE        Backwards walking                tandem   Walking 5 laps no UE //bars In SOLO: x5 laps    HKM 4 laps solo step       Hurdles   3  laps ea fwd/lat no UE //bars  In SOLO: x4 laps fwd, x4 laps lat    Cone taps         Ther Ex        treadmill  No SOLO  BUE  1.6 mph 5% grade x10 min 10 min with 4% incline  - 1.6 mph X10 min at 1.6 mph at 4% incline X10 min 1.6 mph 4% incline no solo    NUstep        Heel raise   30x b/l LE     Glut sets        lunges   5 laps //bars min UE X5 laps in // bars with minimal UE support X6 laps //bars min UE    Lateral Stepping banded      GTB 3x laps //bars min UE support    Supine Heel slide        Supine Quad set        LAQ        Supine hip abd        SAQ        SLR        Ther Activity                        Gait Training        No AD        outdoors        Modalities                                                                                              "

## 2024-07-23 ENCOUNTER — APPOINTMENT (OUTPATIENT)
Dept: PHYSICAL THERAPY | Facility: CLINIC | Age: 74
End: 2024-07-23
Payer: COMMERCIAL

## 2024-07-24 NOTE — TELEPHONE ENCOUNTER
I spoke to pt.   [facility] never reached out to pt as indicated on lab specimen result.  [facility] unable to get sufficient specimen.  Pt will try and go tomorrow for INR.

## 2024-07-24 NOTE — TELEPHONE ENCOUNTER
Pt calling to check on lab results not on chart yet.  He had done Monday. Usually back by Tuesday am  and  results not back yet.    He was waiting to see if he should still take the same amount of medication     warfarin (COUMADIN) 5 mg tablet

## 2024-07-25 ENCOUNTER — TELEPHONE (OUTPATIENT)
Dept: OBGYN CLINIC | Facility: CLINIC | Age: 74
End: 2024-07-25

## 2024-07-25 ENCOUNTER — DOCUMENTATION (OUTPATIENT)
Dept: NEPHROLOGY | Facility: CLINIC | Age: 74
End: 2024-07-25

## 2024-07-25 LAB
EXT ALBUMIN URINE RANDOM: 3.4
EXT BILIRUBIN, UA: NEGATIVE
EXT BLOOD, UA: NEGATIVE
EXT COLOR, UA: YELLOW
EXT GLUCOSE BLD: 75
EXT GLUCOSE, UA: NEGATIVE
EXT KETONES: NEGATIVE
EXT NITRITE, UA: NEGATIVE
EXT PH, UA: 5.5
EXT PROTEIN, UA: NEGATIVE
EXT SPECIFIC GRAVITY, UA: 1.01
EXT UROBILINOGEN: 0.2
EXTERNAL ALBUMIN: 4.2
EXTERNAL ALK PHOS: 101
EXTERNAL ALT: 19
EXTERNAL AST: 15
EXTERNAL BUN: 30
EXTERNAL CALCIUM: 9.5
EXTERNAL CHLORIDE: 101
EXTERNAL CO2: 25
EXTERNAL CREATININE: 2.09
EXTERNAL POTASSIUM: 4.3
EXTERNAL SODIUM: 140
EXTERNAL T.BILIRUBIN: 0.9
EXTERNAL TOTAL PROTEIN: 6.5
MICROALBUMIN/CREAT UR: 12 MG/G{CREAT}

## 2024-07-26 ENCOUNTER — OFFICE VISIT (OUTPATIENT)
Dept: PHYSICAL THERAPY | Facility: CLINIC | Age: 74
End: 2024-07-26
Payer: COMMERCIAL

## 2024-07-26 DIAGNOSIS — M17.11 PRIMARY OSTEOARTHRITIS OF RIGHT KNEE: Primary | ICD-10-CM

## 2024-07-26 DIAGNOSIS — R26.2 AMBULATORY DYSFUNCTION: ICD-10-CM

## 2024-07-26 PROCEDURE — 97112 NEUROMUSCULAR REEDUCATION: CPT | Performed by: PHYSICAL THERAPIST

## 2024-07-26 PROCEDURE — 97110 THERAPEUTIC EXERCISES: CPT | Performed by: PHYSICAL THERAPIST

## 2024-07-26 NOTE — PROGRESS NOTES
Daily Note     Today's date: 2024  Patient name: Tian Garcia  : 1950  MRN: [id number]  Referring provider: Dot Galindo MD  Dx:   Encounter Diagnosis     ICD-10-CM    1. Primary osteoarthritis of right knee  M17.11       2. Ambulatory dysfunction  R26.2                      Subjective: Feeling well today, no new changes      Objective: See treatment diary below      Assessment: Patient was able to more effectively stabilize and push into extension with control during step ups on R LE compared to any other session. He had less compensation and better control into extension. Plan to continue to work on balance and strengthening once again at next session.       Plan: Continue per plan of care.      Short Term Goal Expiration Date:(24)  Long Term Goal Expiration Date: (24)  POC Expiration Date: (24)      POC expires Unit limit Auth Expiration date PT/OT/ST + Visit Limit?   24 bomn 24 bomn     24                      Visit/Unit Tracking  AUTH Status:  Date  6 6   approved Used 4 5 6 7 8 1 2 3 4 5 7 8 2    Remaining  4 3    7 6 5 4 3 1 0 14                     3 4                13 12                    Precautions fall risk   Access Code: [email or web address]  URL: [email or web address]  Date: 2024  Prepared by: Edelmira Hernandez    Exercises  - Supine Bridge  - 1 x daily - 7 x weekly - 3 sets - 10 reps  - Supine Straight Leg Raise  - 1 x daily - 7 x weekly - 3 sets - 10 reps  - Beginner Side Leg Lift  - 1 x daily - 7 x weekly - 3 sets - 10 reps  - Clamshell with Resistance  - 1 x daily - 7 x weekly - 3 sets - 10 reps  - Walking March  - 1 x daily - 7 x weekly - 3 sets - 10 reps  - Lateral Step Up  - 1 x daily - 7 x weekly - 3 sets - 10 reps  - Step Up  - 1 x daily - 7 x weekly - 3 sets - 10 reps  - Standard Lunge  - 1 x daily - 7 x weekly - 3 sets - 10 reps  - Forward Step Down with Heel Tap and  "Counter Support  - 1 x daily - 7 x weekly - 3 sets - 10 reps    Manuals 7/16 7/19 7/26 7/12                                   Neuro Re-Ed         Sit to stand   2x10     FTEC foam     3x30\" no UE needed   Step ups   With BTB for TKE - 20x R LE 6\" //bars    20x L LE 6\" //bars  8\" Fwd and lat 20x ea    Lat step down      6\" x20 ea    Heel tap        foam   Foam to foam lap - 4 laps     Backwards walking                tandem        HKM 4 laps solo step       Hurdles        Cone taps    5 laps //bars prn UE     Ther Ex        treadmill  No SOLO  BUE  1.6 mph 5% grade x10 min No SOLO  BUE  1.6 mph 5% grade x10 min  X10 min 1.6 mph 4% incline no solo    NUstep        Heel raise        Glut sets        lunges   5 laps prn UE  X6 laps //bars min UE    Lateral Stepping banded      GTB 3x laps //bars min UE support    Supine Heel slide        Supine Quad set        LAQ        Supine hip abd        SAQ        SLR        Ther Activity                        Gait Training        No AD        outdoors        Modalities                                                                                                "

## 2024-07-29 ENCOUNTER — ANTICOAG VISIT (OUTPATIENT)
Dept: FAMILY MEDICINE CLINIC | Facility: HOSPITAL | Age: 74
End: 2024-07-29

## 2024-07-29 ENCOUNTER — ANESTHESIA EVENT (OUTPATIENT)
Dept: PERIOP | Facility: HOSPITAL | Age: 74
End: 2024-07-29
Payer: COMMERCIAL

## 2024-07-29 DIAGNOSIS — Z01.89 ENCOUNTER FOR GERIATRIC ASSESSMENT: Primary | ICD-10-CM

## 2024-07-30 ENCOUNTER — OFFICE VISIT (OUTPATIENT)
Dept: PHYSICAL THERAPY | Facility: CLINIC | Age: 74
End: 2024-07-30
Payer: COMMERCIAL

## 2024-07-30 DIAGNOSIS — R26.2 AMBULATORY DYSFUNCTION: ICD-10-CM

## 2024-07-30 DIAGNOSIS — I10 PRIMARY HYPERTENSION: ICD-10-CM

## 2024-07-30 DIAGNOSIS — M17.11 PRIMARY OSTEOARTHRITIS OF RIGHT KNEE: Primary | ICD-10-CM

## 2024-07-30 PROCEDURE — 97112 NEUROMUSCULAR REEDUCATION: CPT | Performed by: PHYSICAL THERAPIST

## 2024-07-30 PROCEDURE — 97110 THERAPEUTIC EXERCISES: CPT | Performed by: PHYSICAL THERAPIST

## 2024-07-30 RX ORDER — TORSEMIDE 20 MG/1
TABLET ORAL
Qty: 135 TABLET | Refills: 1 | Status: SHIPPED | OUTPATIENT
Start: 2024-07-30

## 2024-08-01 NOTE — PROGRESS NOTES
[place]  Progress Note - Orma Shown 1950, 71 y o  male MRN: [id number]  Unit/Bed#: [facility] Encounter: [id number]  Primary Care Provider: Aminah Cantrell MD   Date and time admitted to hospital: 5/10/2022  6:51 PM    Hypomagnesemia  Assessment & Plan  · Magnesium 1 4 on admission  · Ordered 1 g IV magnesium   · Continue to monitor and replete as needed    Hypokalemia  Assessment & Plan  · Potassium 2 8 on admission  · Ordered 40 mEq p o  potassium and 20 mEq IV potassium   · On telemetry   · Continue to monitor and replete as needed    Chronic diastolic (congestive) heart failure (HCC)  Assessment & Plan  Wt Readings from Last 3 Encounters:   05/14/22 106 kg (233 lb)   05/03/22 114 kg (251 lb)   04/29/22 113 kg (250 lb 1 6 oz)     · Prior echo 04/25/2022:  EF 70%, Diastolic function: grade 2 pseudonormal relaxation  · Monitor I/O and daily weights  · Does not take outpatient diuretics  IV Lasix with minimal results during prior admission  Patient had received albumin to help with anasarca  · Fluid restriction, 2 g sodium    Glomerulonephritis  Assessment & Plan  · Diagnosed during prior admission  · Biopsy was not performed during prior admit  · Postinfectious suspected  · Treatment per nephrology     Surgical site infection  Assessment & Plan  · S/p cervical fusion performed by Dr Marya Avila on 0/33/58, complicated by MRSA infection  · Completed 24 days of IV vancomycin, transitioned to daptomycin on 04/24-4/29  Discharged home on doxycycline 100 mg b i d    · Continue doxycycline  · apprec ID, not felt to be contributing to PIGN     Anemia  Assessment & Plan  · Hemoglobin on admission:  7 9  · Patient with hematuria    Believed to be due to GN  · During prior admission, new normocytic anemia, hemoglobin had been 6 6, received 1 unit of PRBCs on 04/22  · FOBT was negative  · Transfuse if hemoglobin less than 7    Weakness  Assessment & Plan  · Reports ongoing weakness and fatigue  Per wife patient spends majority of the day napping  · Denies recent falls  · Suspect ongoing weakness/fatigue due to ongoing infection, JANEL, GN and deconditioning from frequent hospital admissions  · Consult PT/OT    Mixed hyperlipidemia  Assessment & Plan  · Continue statin    Paroxysmal A-fib (HCC)  Assessment & Plan  · Home regimen: metoprolol succinate 100 mg daily and flecainide 100 mg b i d   · Continue metoprolol  flecanide on hold by cards d/t variable renal function  · Coumadin 2 5 mg 5x weekly, 5 mg 2x weekly  · INR 4 0 on 05/09  Outpatient provider had instructed patient to hold dose on 5/9 and 5/10  · INR on admission on 05/10 was 3 15  Resume coumadin as above  · INR goal 2-3     Depression, major, single episode, moderate (HCC)  Assessment & Plan  · Continue Cymbalta    Factor V Leiden mutation ([facility] )  Assessment & Plan  · Prescribed Coumadin  · INR 4 0 on 05/09  Outpatient provider had instructed patient to hold dose on 5/9 and 5/10  · INR on admission on 05/10 was 3 15  Cont coumadin 5 mg (higher than home dose) INR goal 2-3    WILL (obstructive sleep apnea)  Assessment & Plan  · CPAP HS - patient brought home machine in    Essential hypertension  Assessment & Plan  · Home regimen:  Amlodipine 5 mg daily and metoprolol succinate 100 mg daily    * [facility]EL [facility]acute kidney [facility]) [facility] )  Assessment & Plan  · Creatinine on admission: 5 04  · Direct admit due to rising creatinine outpatient  · Bilateral lower extremity 2+ pitting edema  · Prior admission: admitted on 04/22,  Discharged on 04/29 with creatinine 3 85  · Believed to be due to volume overload, postinfectious GN, Staph aureus bacteremia  · Renal biopsy was not performed  Patient and family are not interested in bx as felt to be invasive per their discussion with me  · Baseline prior to this event was 0 6-0 9  · Fluid restriction, monitor I/O  · Avoid nephrotoxins, hypotension, IV contrast  · Apprec nephrology   No clear indications for emergency HD at this time  · Started on diuresis with good output  Cr approximately stable  Cont diuresis per nephrology  Unclear to what degree renal function will recover  VTE  Prophylaxis:   Pharmacologic: in place    Patient Centered Rounds: I have performed bedside rounds with nursing staff today  Discussions with Specialists or Other Care Team Provider: case management    Education and Discussions with Family / Patient: pt son at bedside      Current Length of Stay: 4 day(s)    Current Patient Status: Inpatient        Code Status: Level 3 - DNAR and DNI      Subjective:     Pt seen  Son at bedside  Pt denies sob, chest pain etc  Continues with LE edema which does not bother him    Patient is seen and examined at bedside  All other ROS are negative  Objective:     Vitals:   Temp (24hrs), Av 4 °F (36 3 °C), Min:97 1 °F (36 2 °C), Max:97 5 °F (36 4 °C)    Temp:  [97 1 °F (36 2 °C)-97 5 °F (36 4 °C)] 97 5 °F (36 4 °C)  HR:  [55-58] 58  Resp:  [14-18] 14  BP: (120-166)/(58-83) 166/83  SpO2:  [95 %-98 %] 98 %  Body mass index is 32 5 kg/m²  Input and Output Summary (last 24 hours): Intake/Output Summary (Last 24 hours) at 2022 1356  Last data filed at 2022 0708  Gross per 24 hour   Intake 1420 ml   Output 1275 ml   Net 145 ml       Physical Exam:       GEN: No acute distress, comfortable  In c collar    HEEENT: No JVD, PERRLA, no scleral icterus  RESP: Lungs clear to auscultation bilaterally  CV: RRR, +s1/s2   ABD: SOFT NON TENDER, POSITIVE BOWEL SOUNDS, NO DISTENTION  PSYCH: CALM  NEURO: A X O X 3, NO FOCAL DEFICITS  SKIN: NO RASH  EXTREM: + edema le    Additional Data:     Labs:    Results from last 7 days   Lab Units 22  0520   WBC Thousand/uL 6 35   HEMOGLOBIN g/dL 7 6*   HEMATOCRIT % 24 3*   PLATELETS Thousands/uL 195   NEUTROS PCT % 55   LYMPHS PCT % 29   MONOS PCT % 10   EOS PCT % 5     Results from last 7 days   Lab Units 22  0520   SODIUM mmol/L 140 POTASSIUM mmol/L 3 4*   CHLORIDE mmol/L 103   CO2 mmol/L 28   BUN mg/dL 47*   CREATININE mg/dL 5 39*   ANION GAP mmol/L 9   CALCIUM mg/dL 8 6   ALBUMIN g/dL 2 2*   TOTAL BILIRUBIN mg/dL 0 60   ALK PHOS U/L 90   ALT U/L 7*   AST U/L 16   GLUCOSE RANDOM mg/dL 100     Results from last 7 days   Lab Units 05/14/22  0459   INR  1 44*             Results from last 7 days   Lab Units 05/10/22  1949   LACTIC ACID mmol/L 1 4           * I Have Reviewed All Lab Data Listed Above  Imaging:     Results for orders placed during the hospital encounter of 04/22/22    XR chest 1 view portable    Addendum 4/22/2022  8:39 PM  ADDENDUM:    PICC line tip projects over the region of the mid SVC  Narrative  CHEST    INDICATION:   weakness  COMPARISON:  None    EXAM PERFORMED/VIEWS:  XR CHEST PORTABLE      FINDINGS:    Cardiomediastinal silhouette appears unremarkable  The lungs are clear  No pneumothorax or pleural effusion  Osseous structures appear within normal limits for patient age  Impression  No acute cardiopulmonary disease  Workstation performed: [id number]    No results found for this or any previous visit  *I have reviewed all imaging reports listed above      Recent Cultures (last 7 days):     Results from last 7 days   Lab Units 05/10/22  2154 05/10/22  2022 05/10/22  1953   BLOOD CULTURE   --  No Growth at 72 hrs  No Growth at 72 hrs     URINE CULTURE  No Growth <1000 cfu/mL  --   --        Last 24 Hours Medication List:   Current Facility-Administered Medications   Medication Dose Route Frequency Provider Last Rate    acetaminophen  650 mg Oral Q6H PRN Antonia Cotto PA-C      amLODIPine  5 mg Oral Daily Antonia Cotto PA-C      docusate sodium  100 mg Oral Daily Antonia Cotto PA-C      doxycycline hyclate  100 mg Oral Q12H [address] Antonia Cotto PA-C      DULoxetine  60 mg Oral Daily Antonia Cotto PA-C      gabapentin  100 mg Oral Daily Antonia Cotto PA-C      gabapentin  300 mg Oral [facility] FABIOLA Lou      lidocaine  1 patch Topical HS Annette Flores PA-C      methocarbamol  750 mg Oral Q6H PRN Clark Nuñez PA-C      metoprolol succinate  100 mg Oral Daily Clark Nuñez PA-C      ondansetron  4 mg Intravenous Q4H PRN Annette Flores PA-C      pantoprazole  40 mg Oral BID AC Jonelle Lou PA-C      potassium chloride  40 mEq Oral Daily Eduardo Polk MD      pravastatin  40 mg Oral Daily With Dinner Clark Nuñez PA-C      tamsulosin  0 4 mg Oral Daily With Dinner Clark Nuñez PA-C      warfarin  5 mg Oral Daily (warfarin) Eduardo Polk MD          Today, Patient Was Seen By: Eduardo Polk MD    ** Please Note: Dictation voice to text software may have been used in the creation of this document   ** No

## 2024-08-02 ENCOUNTER — OFFICE VISIT (OUTPATIENT)
Dept: PHYSICAL THERAPY | Facility: CLINIC | Age: 74
End: 2024-08-02
Payer: COMMERCIAL

## 2024-08-02 DIAGNOSIS — R26.2 AMBULATORY DYSFUNCTION: ICD-10-CM

## 2024-08-02 DIAGNOSIS — M17.11 PRIMARY OSTEOARTHRITIS OF RIGHT KNEE: Primary | ICD-10-CM

## 2024-08-02 PROCEDURE — 97112 NEUROMUSCULAR REEDUCATION: CPT | Performed by: PHYSICAL THERAPIST

## 2024-08-02 PROCEDURE — 97110 THERAPEUTIC EXERCISES: CPT | Performed by: PHYSICAL THERAPIST

## 2024-08-02 NOTE — PROGRESS NOTES
Daily Note     Today's date: 2024  Patient name: Tian Garcia  : 1950  MRN: [id number]  Referring provider: Dot Galindo MD  Dx:   Encounter Diagnosis     ICD-10-CM    1. Primary osteoarthritis of right knee  M17.11       2. Ambulatory dysfunction  R26.2                      Subjective: Feels his balance continues to get better      Objective: See treatment diary below      Assessment: Pt wasa able to successfully step over hurdles with foam under his feet without any loss of balance which is a good demonstration of continued improved balance. Overall continues to be weak in hips but feels that is improving.       Plan: Continue per plan of care.      Short Term Goal Expiration Date:(24)  Long Term Goal Expiration Date: (24)  POC Expiration Date: (24)      POC expires Unit limit Auth Expiration date PT/OT/ST + Visit Limit?   24 bomn 24 bomn     24                      Visit/Unit Tracking  AUTH Status:  Date  6 6/   approved Used 4 5 6 7 8 1 2 3 4 5 7 8 2    Remaining  4 3    7 6 5 4 3 1 0 14      8/2              3 4 6 7              13 12 10 9                  Precautions fall risk   Access Code: [email or web address]  URL: [email or web address]  Date: 2024  Prepared by: Edelmira Hernandez    Exercises  - Supine Bridge  - 1 x daily - 7 x weekly - 3 sets - 10 reps  - Supine Straight Leg Raise  - 1 x daily - 7 x weekly - 3 sets - 10 reps  - Beginner Side Leg Lift  - 1 x daily - 7 x weekly - 3 sets - 10 reps  - Clamshell with Resistance  - 1 x daily - 7 x weekly - 3 sets - 10 reps  - Walking March  - 1 x daily - 7 x weekly - 3 sets - 10 reps  - Lateral Step Up  - 1 x daily - 7 x weekly - 3 sets - 10 reps  - Step Up  - 1 x daily - 7 x weekly - 3 sets - 10 reps  - Standard Lunge  - 1 x daily - 7 x weekly - 3 sets - 10 reps  - Forward Step Down with Heel Tap and Counter Support  - 1 x daily - 7 x  "weekly - 3 sets - 10 reps    Manuals 7/16 7/19 7/26 7/30 8/2                                   Neuro Re-Ed         Sit to stand   2x10  2x10 with feet on airex   FTEC foam        Step ups   With BTB for TKE - 20x R LE 6\" //bars    20x L LE 6\" //bars 8\" step 30x R LE with PRN UE //bars Up and over 8\" and 2 6\" steps solo step - 5 laps with R LE lead   Lat step down         Heel tap        foam   Foam to foam lap - 4 laps  Double Faom beams with hurdles 6\" on top - 5 laps fwd   Backwards walking        River rocks    10 laps //bars small and medium rocks Prn UE    tandem     4 laps solo step    HKM 4 laps solo step       Hurdles    12\" hurdles 7 laps //bars PRN UE    Cone taps    5 laps //bars prn UE     Ther Ex        treadmill  No SOLO  BUE  1.6 mph 5% grade x10 min No SOLO  BUE  1.6 mph 5% grade x10 min B/l UE 1.6 mph 5% grade 10 min    NUstep     5 min L6 for msucular endurance   Heel raise        Glut sets        lunges   5 laps prn UE 5 laps PRN UE     Lateral Stepping banded         Supine Heel slide        Supine Quad set        LAQ        Supine hip abd        SAQ        SLR        Ther Activity                        Gait Training        No AD        outdoors        Modalities                                                                                                    "

## 2024-08-06 ENCOUNTER — OFFICE VISIT (OUTPATIENT)
Dept: PHYSICAL THERAPY | Facility: CLINIC | Age: 74
End: 2024-08-06
Payer: COMMERCIAL

## 2024-08-06 DIAGNOSIS — M17.11 PRIMARY OSTEOARTHRITIS OF RIGHT KNEE: Primary | ICD-10-CM

## 2024-08-06 DIAGNOSIS — R26.2 AMBULATORY DYSFUNCTION: ICD-10-CM

## 2024-08-06 PROCEDURE — 97112 NEUROMUSCULAR REEDUCATION: CPT | Performed by: PHYSICAL THERAPIST

## 2024-08-06 PROCEDURE — 97116 GAIT TRAINING THERAPY: CPT | Performed by: PHYSICAL THERAPIST

## 2024-08-06 NOTE — PROGRESS NOTES
Daily Note     Today's date: 2024  Patient name: Tian Garcia  : 1950  MRN: [id number]  Referring provider: Dot Galindo MD  Dx:   Encounter Diagnosis     ICD-10-CM    1. Primary osteoarthritis of right knee  M17.11                      Subjective: Patient reports to physical therapy this date with no new changes to report      Objective: See treatment diary below      Assessment: Patient was able to complete hurdles with weights on ankles with minimal to no difficulty today. Plan to progress to unevn surface wit hhurdles next time.       Plan: Continue per plan of care.      Short Term Goal Expiration Date:(24)  Long Term Goal Expiration Date: (24)  POC Expiration Date: (24)      POC expires Unit limit Auth Expiration date PT/OT/ST + Visit Limit?   24 bomn 24 bomn     24                      Visit/Unit Tracking  AUTH Status:  Date    approved Used 4 5 6 7 8 1 2 3 4 5 7 8 2    Remaining  4 3    7 6 5 4 3 1 0 14      8/2 8/6             3 4 6 7 8             13 12 10 9                  Precautions fall risk   Access Code: [email or web address]  URL: [email or web address]  Date: 2024  Prepared by: Edelmira Hernandez    Exercises  - Supine Bridge  - 1 x daily - 7 x weekly - 3 sets - 10 reps  - Supine Straight Leg Raise  - 1 x daily - 7 x weekly - 3 sets - 10 reps  - Beginner Side Leg Lift  - 1 x daily - 7 x weekly - 3 sets - 10 reps  - Clamshell with Resistance  - 1 x daily - 7 x weekly - 3 sets - 10 reps  - Walking March  - 1 x daily - 7 x weekly - 3 sets - 10 reps  - Lateral Step Up  - 1 x daily - 7 x weekly - 3 sets - 10 reps  - Step Up  - 1 x daily - 7 x weekly - 3 sets - 10 reps  - Standard Lunge  - 1 x daily - 7 x weekly - 3 sets - 10 reps  - Forward Step Down with Heel Tap and Counter Support  - 1 x daily - 7 x weekly - 3 sets - 10 reps    Manuals                   "                  Neuro Re-Ed         Sit to stand Airex - 2x10  2x10  2x10 with feet on airex   FTEC foam        Step ups 8\" step 20x ea LE //bars with 4# aw  With BTB for TKE - 20x R LE 6\" //bars    20x L LE 6\" //bars 8\" step 30x R LE with PRN UE //bars Up and over 8\" and 2 6\" steps solo step - 5 laps with R LE lead   Lat step down         Heel tap        foam   Foam to foam lap - 4 laps  Double Faom beams with hurdles 6\" on top - 5 laps fwd   Backwards walking        River rocks    10 laps //bars small and medium rocks Prn UE    tandem     4 laps solo step    HKM        Hurdles 6\" hurdles with b/l 4# aw 4 laps fwd, lat ea    12\" hurdles 7 laps //bars PRN UE    Cone taps  4# aw solo step 4 laps   5 laps //bars prn UE     Ther Ex        treadmill 10% incline for 6 minutes at 1.6 mph b/l UE   No SOLO  BUE  1.6 mph 5% grade x10 min B/l UE 1.6 mph 5% grade 10 min    NUstep     5 min L6 for msucular endurance   Heel raise        Glut sets        lunges   5 laps prn UE 5 laps PRN UE     Lateral Stepping banded         Supine Heel slide        Supine Quad set        LAQ        Supine hip abd        SAQ        SLR        Ther Activity                        Gait Training        No AD        outdoors        Modalities                                                                                                      "

## 2024-08-07 ENCOUNTER — OFFICE VISIT (OUTPATIENT)
Dept: FAMILY MEDICINE CLINIC | Facility: HOSPITAL | Age: 74
End: 2024-08-07
Payer: COMMERCIAL

## 2024-08-07 VITALS
BODY MASS INDEX: 30.66 KG/M2 | OXYGEN SATURATION: 97 % | WEIGHT: 219 LBS | HEART RATE: 91 BPM | DIASTOLIC BLOOD PRESSURE: 80 MMHG | SYSTOLIC BLOOD PRESSURE: 126 MMHG | HEIGHT: 71 IN

## 2024-08-07 DIAGNOSIS — M17.12 PRIMARY OSTEOARTHRITIS OF LEFT KNEE: Primary | ICD-10-CM

## 2024-08-07 DIAGNOSIS — I25.5 ISCHEMIC CARDIOMYOPATHY: ICD-10-CM

## 2024-08-07 DIAGNOSIS — G95.9 CERVICAL MYELOPATHY (HCC): ICD-10-CM

## 2024-08-07 DIAGNOSIS — I25.10 ATHEROSCLEROSIS OF NATIVE CORONARY ARTERY OF NATIVE HEART WITHOUT ANGINA PECTORIS: ICD-10-CM

## 2024-08-07 DIAGNOSIS — M47.12 OTHER SPONDYLOSIS WITH MYELOPATHY, CERVICAL REGION: ICD-10-CM

## 2024-08-07 DIAGNOSIS — I48.0 PAROXYSMAL A-FIB (HCC): ICD-10-CM

## 2024-08-07 DIAGNOSIS — M19.011 PRIMARY OSTEOARTHRITIS OF RIGHT SHOULDER: ICD-10-CM

## 2024-08-07 DIAGNOSIS — I35.0 MODERATE AORTIC STENOSIS: ICD-10-CM

## 2024-08-07 DIAGNOSIS — I10 PRIMARY HYPERTENSION: ICD-10-CM

## 2024-08-07 PROCEDURE — G2211 COMPLEX E/M VISIT ADD ON: HCPCS | Performed by: INTERNAL MEDICINE

## 2024-08-07 PROCEDURE — 99214 OFFICE O/P EST MOD 30 MIN: CPT | Performed by: INTERNAL MEDICINE

## 2024-08-07 RX ORDER — TRAMADOL HYDROCHLORIDE 50 MG/1
50 TABLET ORAL EVERY 8 HOURS PRN
Qty: 90 TABLET | Refills: 0 | Status: SHIPPED | OUTPATIENT
Start: 2024-08-07

## 2024-08-07 NOTE — ASSESSMENT & PLAN NOTE
Significant improvement since neck surgery- 3 years ago  Doing PT and still gradually improved  Enjoys hunting and fishing( not able to cast due to shoulder pain recently)

## 2024-08-07 NOTE — PROGRESS NOTES
Assessment/Plan:     Diagnosis ICD-10-CM Associated Orders   1. Primary osteoarthritis of left knee  M17.12 traMADol (Ultram) 50 mg tablet      2. Primary osteoarthritis of right shoulder  M19.011 traMADol (Ultram) 50 mg tablet      3. Other spondylosis with myelopathy, cervical region  M47.12       4. Cervical myelopathy (HCC)  G95.9       5. Paroxysmal A-fib (HCC)  I48.0       6. Ischemic cardiomyopathy  I25.5       7. Primary hypertension  I10       8. Moderate aortic stenosis  I35.0       9. Atherosclerosis of native coronary artery of native heart without angina pectoris  I25.10           Problem List Items Addressed This Visit        Cardiovascular and Mediastinum    Paroxysmal A-fib (HCC)     Has upcoming appt with Dr. Lock for preop clearance         Primary hypertension     Well controlled-   Controlled on metoprolol         Atherosclerosis of native coronary artery of native heart without angina pectoris    Ischemic cardiomyopathy    Moderate aortic stenosis       Nervous and Auditory    Cervical myelopathy (HCC)    Other spondylosis with myelopathy, cervical region     Significant improvement since neck surgery- 3 years ago  Doing PT and still gradually improved  Enjoys hunting and fishing( not able to cast due to shoulder pain recently)            Musculoskeletal and Integument    Primary osteoarthritis of left knee - Primary    Relevant Medications    traMADol (Ultram) 50 mg tablet    Primary osteoarthritis of right shoulder    Relevant Medications    traMADol (Ultram) 50 mg tablet         No follow-ups on file.      Subjective:    Patient ID: Tian Garcia is a 74 y.o. male    Overall doing well- no sob or chest pain- has atrial fibrillation - paroxysmal  Had right knee surgery- 2 years ago plus- doing PT   had prior neck surgery with mrsa        The following portions of the patient's history were reviewed and updated as appropriate: allergies, current medications and problem list.     Review of  Systems   Constitutional:  Negative for fatigue.   HENT:  Negative for congestion and postnasal drip.    Respiratory:  Negative for shortness of breath.    Cardiovascular:  Negative for chest pain and palpitations.   Gastrointestinal:  Negative for abdominal pain and rectal pain.   Genitourinary:  Negative for dysuria.   Musculoskeletal:  Positive for arthralgias, back pain and neck pain.        Right shoulder pain- had recent injection but not much help- will increase to  tramadol 3x day for now   Skin:  Negative for color change and rash.   All other systems reviewed and are negative.        Objective:      Current Outpatient Medications:   •  Aspirin 81 MG CAPS, Take by mouth, Disp: , Rfl:   •  atorvastatin (LIPITOR) 80 mg tablet, TAKE 1 TABLET BY MOUTH EVERY EVENING, Disp: 90 tablet, Rfl: 1  •  DULoxetine (CYMBALTA) 60 mg delayed release capsule, TAKE 1 CAPSULE BY MOUTH EVERY DAY, Disp: 90 capsule, Rfl: 1  •  ezetimibe (ZETIA) 10 mg tablet, TAKE 1 TABLET BY MOUTH EVERY DAY, Disp: 90 tablet, Rfl: 3  •  gabapentin (NEURONTIN) 300 mg capsule, TAKE 1 CAPSULE BY MOUTH DAILY AT BEDTIME, Disp: 90 capsule, Rfl: 1  •  glucose blood (Accu-Chek Guide) test strip, Use one new strip daily dx r73.01, Disp: 100 strip, Rfl: 1  •  metoprolol succinate (TOPROL-XL) 25 mg 24 hr tablet, Take 1 tablet (25 mg total) by mouth daily, Disp: 90 tablet, Rfl: 1  •  mometasone (ELOCON) 0.1 % cream, APPLY TO AFFECTED AREA TOPICALLY EVERY DAY, Disp: 45 g, Rfl: 1  •  potassium chloride (MICRO-K) 10 MEQ CR capsule, Take 2 capsules (20 mEq total) by mouth 2 (two) times a day, Disp: 180 capsule, Rfl: 1  •  tamsulosin (FLOMAX) 0.4 mg, TAKE 1 CAPSULE BY MOUTH DAILY  WITH DINNER, Disp: 90 capsule, Rfl: 1  •  tirzepatide (Mounjaro) 2.5 MG/0.5ML, INJECT 0.5 ML (2.5 MG TOTAL) UNDER THE SKIN EVERY 7 DAYS, Disp: 2 mL, Rfl: 1  •  torsemide (DEMADEX) 20 mg tablet, TAKE 1 AND 1/2 TABLETS ALTERNATING WITH 1 TABLET DAILY AS DIRECTED (Patient taking  "differently: PT taking 1 tablet daily), Disp: 135 tablet, Rfl: 1  •  traMADol (Ultram) 50 mg tablet, Take 1 tablet (50 mg total) by mouth every 8 (eight) hours as needed for moderate pain, Disp: 90 tablet, Rfl: 0  •  warfarin (COUMADIN) 5 mg tablet, TAKE ONE-HALF TABLET BY MOUTH  DAILY , EXCEPT TAKE 1 TABLET BY  MOUTH ON WEDNESDAY AND SATURDAY, Disp: 58 tablet, Rfl: 3  •  zolpidem (AMBIEN) 10 mg tablet, Take 1 tablet (10 mg total) by mouth daily at bedtime as needed for sleep, Disp: 90 tablet, Rfl: 0  •  ascorbic acid (VITAMIN C) 500 MG tablet, Take 1 tablet (500 mg total) by mouth 2 (two) times a day (Patient not taking: Reported on 8/7/2024), Disp: 60 tablet, Rfl: 1  •  ferrous sulfate 324 (65 Fe) mg, Take 1 tablet (324 mg total) by mouth daily before breakfast (Patient not taking: Reported on 8/7/2024), Disp: 60 tablet, Rfl: 1  •  folic acid (FOLVITE) 1 mg tablet, Take 1 tablet (1 mg total) by mouth daily (Patient not taking: Reported on 8/7/2024), Disp: 30 tablet, Rfl: 1  •  nitroglycerin (NITROSTAT) 0.4 mg SL tablet, Place 1 tablet (0.4 mg total) under the tongue every 5 (five) minutes as needed for chest pain (Patient not taking: Reported on 8/7/2024), Disp: 30 tablet, Rfl: 0    Blood pressure 126/80, pulse 91, height 5' 11\" (1.803 m), weight 99.3 kg (219 lb), SpO2 97%.     Physical Exam  Vitals and nursing note reviewed.   HENT:      Head: Normocephalic.      Right Ear: There is no impacted cerumen.      Left Ear: There is no impacted cerumen.      Nose: No congestion.   Eyes:      Pupils: Pupils are equal, round, and reactive to light.   Cardiovascular:      Rate and Rhythm: Normal rate. Rhythm irregular.      Heart sounds: No murmur heard.  Pulmonary:      Breath sounds: No wheezing or rhonchi.   Abdominal:      General: There is no distension.      Palpations: Abdomen is soft.   Musculoskeletal:         General: No swelling or tenderness.   Skin:     Findings: No erythema.   Neurological:      Mental " Status: He is alert and oriented to person, place, and time.      Cranial Nerves: No cranial nerve deficit.   Psychiatric:         Mood and Affect: Mood normal.         Thought Content: Thought content normal.         Judgment: Judgment normal.

## 2024-08-09 ENCOUNTER — OFFICE VISIT (OUTPATIENT)
Dept: PHYSICAL THERAPY | Facility: CLINIC | Age: 74
End: 2024-08-09
Payer: COMMERCIAL

## 2024-08-09 DIAGNOSIS — M17.11 PRIMARY OSTEOARTHRITIS OF RIGHT KNEE: Primary | ICD-10-CM

## 2024-08-09 DIAGNOSIS — R26.2 AMBULATORY DYSFUNCTION: ICD-10-CM

## 2024-08-09 PROCEDURE — 97110 THERAPEUTIC EXERCISES: CPT | Performed by: PHYSICAL THERAPIST

## 2024-08-09 PROCEDURE — 97112 NEUROMUSCULAR REEDUCATION: CPT | Performed by: PHYSICAL THERAPIST

## 2024-08-09 NOTE — PROGRESS NOTES
Daily Note     Today's date: 2024  Patient name: Tian Garcia  : 1950  MRN: [id number]  Referring provider: Dot Galindo MD  Dx: No diagnosis found.               Subjective: Has been walking around home without assistive device and is not having back pain.       Objective: See treatment diary below      Assessment: Pt is progressing with overall stability and balance. Discussed possible d/c next week, will reassess at that time. Patient does continue to have weakness in R LE that hasn't made major improvements recently. Will continue to encourage HEP to improve strength.       Plan: Continue per plan of care.      Short Term Goal Expiration Date:(24)  Long Term Goal Expiration Date: (24)  POC Expiration Date: (24)      POC expires Unit limit Auth Expiration date PT/OT/ST + Visit Limit?   24 bomn 24 bomn     24                      Visit/Unit Tracking  AUTH Status:  Date  6   approved Used 4 5 6 7 8 1 2 3 4 5 7 8 2    Remaining  4 3    7 6 5 4 3 1 0 14      8/2 8/6 8/9            3 4 6 7 8 9            13 12 10 9                  Precautions fall risk   Access Code: [email or web address]  URL: [email or web address]  Date: 2024  Prepared by: Edelmira Hernandez    Exercises  - Supine Bridge  - 1 x daily - 7 x weekly - 3 sets - 10 reps  - Supine Straight Leg Raise  - 1 x daily - 7 x weekly - 3 sets - 10 reps  - Beginner Side Leg Lift  - 1 x daily - 7 x weekly - 3 sets - 10 reps  - Clamshell with Resistance  - 1 x daily - 7 x weekly - 3 sets - 10 reps  - Walking March  - 1 x daily - 7 x weekly - 3 sets - 10 reps  - Lateral Step Up  - 1 x daily - 7 x weekly - 3 sets - 10 reps  - Step Up  - 1 x daily - 7 x weekly - 3 sets - 10 reps  - Standard Lunge  - 1 x daily - 7 x weekly - 3 sets - 10 reps  - Forward Step Down with Heel Tap and Counter Support  - 1 x daily - 7 x weekly - 3 sets - 10  "reps    Manuals 8/6 8/9 7/26 7/30 8/2                                   Neuro Re-Ed         Sit to stand Airex - 2x10  2x10  2x10 with feet on airex   FTEC foam        Step ups 8\" step 20x ea LE //bars with 4# aw Up and over 8\" and 6\" steps with 4# aw 5 laps fwd/lat With BTB for TKE - 20x R LE 6\" //bars    20x L LE 6\" //bars 8\" step 30x R LE with PRN UE //bars Up and over 8\" and 2 6\" steps solo step - 5 laps with R LE lead   Lat step down         Heel tap        foam  Side stepping foam beam 5 laps with 4# aw Foam to foam lap - 4 laps  Double Faom beams with hurdles 6\" on top - 5 laps fwd   Backwards walking        River rocks    10 laps //bars small and medium rocks Prn UE    tandem     4 laps solo step    HKM        Hurdles 6\" hurdles with b/l 4# aw 4 laps fwd, lat ea    12\" hurdles 7 laps //bars PRN UE    Cone taps  4# aw solo step 4 laps  Side stepping on foam beam - 3 laps Prn UE 5 laps //bars prn UE     Ther Ex        treadmill 10% incline for 6 minutes at 1.6 mph b/l UE  5% incline for 5 min at 1.8 mph b/l UE, 5 min without incline at 1.8 mph No SOLO  BUE  1.6 mph 5% grade x10 min B/l UE 1.6 mph 5% grade 10 min    NUstep     5 min L6 for msucular endurance   Heel raise        Glut sets        lunges   5 laps prn UE 5 laps PRN UE     Lateral Stepping banded         Supine Heel slide        Supine Quad set        LAQ        Supine hip abd        SAQ        SLR        Ther Activity                        Gait Training        No AD        outdoors        Modalities                                                                                                        "

## 2024-08-12 ENCOUNTER — HOSPITAL ENCOUNTER (OUTPATIENT)
Dept: NON INVASIVE DIAGNOSTICS | Age: 74
Discharge: HOME | End: 2024-08-12
Payer: COMMERCIAL

## 2024-08-12 VITALS
DIASTOLIC BLOOD PRESSURE: 80 MMHG | SYSTOLIC BLOOD PRESSURE: 126 MMHG | HEIGHT: 71 IN | WEIGHT: 219 LBS | BODY MASS INDEX: 30.66 KG/M2 | HEART RATE: 117 BPM

## 2024-08-12 DIAGNOSIS — I35.0 MODERATE AORTIC STENOSIS: ICD-10-CM

## 2024-08-12 DIAGNOSIS — G95.9 CERVICAL MYELOPATHY (HCC): ICD-10-CM

## 2024-08-12 PROCEDURE — 93306 TTE W/DOPPLER COMPLETE: CPT | Performed by: INTERNAL MEDICINE

## 2024-08-12 PROCEDURE — 93306 TTE W/DOPPLER COMPLETE: CPT

## 2024-08-12 RX ORDER — DULOXETIN HYDROCHLORIDE 60 MG/1
CAPSULE, DELAYED RELEASE ORAL
Qty: 90 CAPSULE | Refills: 1 | Status: SHIPPED | OUTPATIENT
Start: 2024-08-12

## 2024-08-13 ENCOUNTER — DOCUMENTATION (OUTPATIENT)
Dept: NEPHROLOGY | Facility: CLINIC | Age: 74
End: 2024-08-13

## 2024-08-13 ENCOUNTER — OFFICE VISIT (OUTPATIENT)
Dept: PHYSICAL THERAPY | Facility: CLINIC | Age: 74
End: 2024-08-13
Payer: COMMERCIAL

## 2024-08-13 ENCOUNTER — ANTICOAG VISIT (OUTPATIENT)
Dept: FAMILY MEDICINE CLINIC | Facility: HOSPITAL | Age: 74
End: 2024-08-13

## 2024-08-13 DIAGNOSIS — M17.11 PRIMARY OSTEOARTHRITIS OF RIGHT KNEE: Primary | ICD-10-CM

## 2024-08-13 DIAGNOSIS — R26.2 AMBULATORY DYSFUNCTION: ICD-10-CM

## 2024-08-13 LAB
CREAT ?TM UR-SCNC: 41.5 UMOL/L
EXT ALBUMIN URINE RANDOM: 4.1
EXT BILIRUBIN, UA: NEGATIVE
EXT BLOOD, UA: NEGATIVE
EXT COLOR, UA: YELLOW
EXT GLUCOSE BLD: 96
EXT GLUCOSE BLD: 97
EXT GLUCOSE, UA: NEGATIVE
EXT KETONES: NEGATIVE
EXT NITRITE, UA: NEGATIVE
EXT PH, UA: 6
EXT PROTEIN, UA: NEGATIVE
EXT SPECIFIC GRAVITY, UA: 1.01
EXT UROBILINOGEN: 0.2
EXTERNAL ALBUMIN: 4.2
EXTERNAL ALK PHOS: 93
EXTERNAL ALT: 18
EXTERNAL AST: 21
EXTERNAL BACTERIA (UA): NORMAL
EXTERNAL BUN: 33
EXTERNAL BUN: 34
EXTERNAL CALCIUM: 9.5
EXTERNAL CALCIUM: 9.5
EXTERNAL CASTS (UA): NORMAL
EXTERNAL CHLORIDE: 100
EXTERNAL CHLORIDE: 102
EXTERNAL CO2: 21
EXTERNAL CO2: 25
EXTERNAL CREATININE: 2.09
EXTERNAL CREATININE: 2.32
EXTERNAL POTASSIUM: 4.8
EXTERNAL POTASSIUM: 4.8
EXTERNAL RBC (UA): NORMAL
EXTERNAL SODIUM: 138
EXTERNAL SODIUM: 142
EXTERNAL T.BILIRUBIN: 0.9
EXTERNAL TOTAL PROTEIN: 6.5
EXTERNAL WBC (UA): NORMAL
MICROALBUMIN/CREAT UR: 10 MG/G{CREAT}

## 2024-08-13 PROCEDURE — 97150 GROUP THERAPEUTIC PROCEDURES: CPT | Performed by: PHYSICAL THERAPIST

## 2024-08-13 PROCEDURE — 97112 NEUROMUSCULAR REEDUCATION: CPT | Performed by: PHYSICAL THERAPIST

## 2024-08-13 NOTE — PROGRESS NOTES
Discharge Note     Today's date: 2024  Patient name: Tian Garcia  : 1950  MRN: [id number]  Referring provider: Dot Galindo MD  Dx:   Encounter Diagnosis     ICD-10-CM    1. Primary osteoarthritis of right knee  M17.11       2. Ambulatory dysfunction  R26.2                      Subjective: Feels prepared for discharge. Knows HEP and feels confident. Feels confident with his balance. Will return to gym and continue to work out at the gym.       Objective: See treatment diary below    Functional outcomes :   mwt: 850 ft with SPC  Gait speed: 0.9 m/s  FGA:  FGA -      Functional Outcomes:   TUG: -   17.13 sec with rollator  (IE)   - 13 sec with RW   - 11 sec with SPC      Assessment: Patient reports to physical therapy this date for discharge. At this time patient has made significant improvements overall in endurance, balance, gait speed reduced dependence on assistive device, has full knee range of motion and improved strength. Pt does however remain at a high fall risk, has decreased endurance, strength and overall has some difficulty with community mobility but has reached maximal level at this time until left knee replacement surgery ocurs. Pt will be d/c at this time with goals met.       Plan: Continue per plan of care.      Short Term Goal Expiration Date:(24)  Long Term Goal Expiration Date: (24)  POC Expiration Date: (24)      POC expires Unit limit Auth Expiration date PT/OT/ST + Visit Limit?   24 bomn 24 bomn     24                      Visit/Unit Tracking  AUTH Status:  Date  6/ 6/   approved Used 4 5 6 7 8 1 2 3 4 5 7 8 2    Remaining  4 3    7 6 5 4 3 1 0 14      8/2 8/6 8/9            3 4 6 7 8 9            13 12 10 9                  Precautions fall risk   Access Code: [email or web address]  URL: [email or web address]  Date: 2024  Prepared by:  "Edelmira Hernandez    Exercises  - Supine Bridge  - 1 x daily - 7 x weekly - 3 sets - 10 reps  - Supine Straight Leg Raise  - 1 x daily - 7 x weekly - 3 sets - 10 reps  - Beginner Side Leg Lift  - 1 x daily - 7 x weekly - 3 sets - 10 reps  - Clamshell with Resistance  - 1 x daily - 7 x weekly - 3 sets - 10 reps  - Walking March  - 1 x daily - 7 x weekly - 3 sets - 10 reps  - Lateral Step Up  - 1 x daily - 7 x weekly - 3 sets - 10 reps  - Step Up  - 1 x daily - 7 x weekly - 3 sets - 10 reps  - Standard Lunge  - 1 x daily - 7 x weekly - 3 sets - 10 reps  - Forward Step Down with Heel Tap and Counter Support  - 1 x daily - 7 x weekly - 3 sets - 10 reps    Manuals 8/6 8/9 7/26 7/30 8/2                                   Neuro Re-Ed         Sit to stand Airex - 2x10  2x10  2x10 with feet on airex   FTEC foam        Step ups 8\" step 20x ea LE //bars with 4# aw Up and over 8\" and 6\" steps with 4# aw 5 laps fwd/lat With BTB for TKE - 20x R LE 6\" //bars    20x L LE 6\" //bars 8\" step 30x R LE with PRN UE //bars Up and over 8\" and 2 6\" steps solo step - 5 laps with R LE lead   Lat step down         Heel tap        foam  Side stepping foam beam 5 laps with 4# aw Foam to foam lap - 4 laps  Double Faom beams with hurdles 6\" on top - 5 laps fwd   Backwards walking        River rocks    10 laps //bars small and medium rocks Prn UE    tandem     4 laps solo step    HKM        Hurdles 6\" hurdles with b/l 4# aw 4 laps fwd, lat ea    12\" hurdles 7 laps //bars PRN UE    Cone taps  4# aw solo step 4 laps  Side stepping on foam beam - 3 laps Prn UE 5 laps //bars prn UE     Ther Ex        treadmill 10% incline for 6 minutes at 1.6 mph b/l UE  5% incline for 5 min at 1.8 mph b/l UE, 5 min without incline at 1.8 mph No SOLO  BUE  1.6 mph 5% grade x10 min B/l UE 1.6 mph 5% grade 10 min    NUstep     5 min L6 for msucular endurance   Heel raise        Glut sets        lunges   5 laps prn UE 5 laps PRN UE     Lateral Stepping banded       "   Supine Heel slide        Supine Quad set        LAQ        Supine hip abd        SAQ        SLR        Ther Activity                        Gait Training        No AD        outdoors        Modalities

## 2024-08-14 LAB
AORTIC ROOT: 3.3 CM
AORTIC VALVE MEAN VELOCITY: 17.5 M/S
APICAL FOUR CHAMBER EJECTION FRACTION: 42 %
AV AREA BY CONTINUOUS VTI: 1.3 CM2
AV AREA PEAK VELOCITY: 1.2 CM2
AV LVOT MEAN GRADIENT: 1 MMHG
AV LVOT PEAK GRADIENT: 2 MMHG
AV MEAN GRADIENT: 13 MMHG
AV PEAK GRADIENT: 23 MMHG
AV VALVE AREA: 1.32 CM2
AV VELOCITY RATIO: 0.32
BSA FOR ECHO PROCEDURE: 2.19 M2
DOP CALC AO PEAK VEL: 2.36 M/S
DOP CALC AO VTI: 42.24 CM
DOP CALC LVOT AREA: 3.8 CM2
DOP CALC LVOT CARDIAC INDEX: 2.13 L/MIN/M2
DOP CALC LVOT CARDIAC OUTPUT: 4.67 L/MIN
DOP CALC LVOT DIAMETER: 2.2 CM
DOP CALC LVOT PEAK VEL VTI: 14.62 CM
DOP CALC LVOT PEAK VEL: 0.75 M/S
DOP CALC LVOT STROKE INDEX: 24.2 ML/M2
DOP CALC LVOT STROKE VOLUME: 55.55
DOP CALC MV VTI: 16.12 CM
FRACTIONAL SHORTENING: 16 (ref 28–44)
INTERVENTRICULAR SEPTUM IN DIASTOLE (PARASTERNAL SHORT AXIS VIEW): 0.8 CM
INTERVENTRICULAR SEPTUM: 0.8 CM (ref 0.6–1.1)
LAAS-AP2: 25.4 CM2
LAAS-AP4: 31 CM2
LEFT ATRIUM SIZE: 4.9 CM
LEFT ATRIUM VOLUME (MOD BIPLANE): 100 ML
LEFT ATRIUM VOLUME INDEX (MOD BIPLANE): 45.7 ML/M2
LEFT INTERNAL DIMENSION IN SYSTOLE: 5.3 CM (ref 2.1–4)
LEFT VENTRICLE DIASTOLIC VOLUME (MOD BIPLANE): 142 ML
LEFT VENTRICLE DIASTOLIC VOLUME INDEX (MOD BIPLANE): 64.8 ML/M2
LEFT VENTRICLE SYSTOLIC VOLUME (MOD BIPLANE): 89 ML
LEFT VENTRICLE SYSTOLIC VOLUME INDEX (MOD BIPLANE): 40.6 ML/M2
LEFT VENTRICULAR INTERNAL DIMENSION IN DIASTOLE: 6.3 CM (ref 3.5–6)
LEFT VENTRICULAR POSTERIOR WALL IN END DIASTOLE: 0.7 CM
LEFT VENTRICULAR STROKE VOLUME: 63 ML
LV EF: 37 %
LVSV (TEICH): 63 ML
MV MEAN GRADIENT: 2 MMHG
MV PEAK GRADIENT: 5 MMHG
MV VALVE AREA BY CONTINUITY EQUATION: 3.45 CM2
RIGHT ATRIAL 2D VOLUME: 83 ML
RIGHT ATRIUM AREA SYSTOLE A4C: 25.3 CM2
RIGHT VENTRICLE ID DIMENSION: 4.4 CM
SL CV LEFT ATRIUM LENGTH A2C: 6.3 CM
SL CV LV EF: 37
SL CV PED ECHO LEFT VENTRICLE DIASTOLIC VOLUME (MOD BIPLANE) 2D: 201 ML
SL CV PED ECHO LEFT VENTRICLE SYSTOLIC VOLUME (MOD BIPLANE) 2D: 137 ML
TRICUSPID ANNULAR PLANE SYSTOLIC EXCURSION: 1.8 CM

## 2024-08-15 ENCOUNTER — APPOINTMENT (OUTPATIENT)
Dept: PHYSICAL THERAPY | Facility: CLINIC | Age: 74
End: 2024-08-15
Payer: COMMERCIAL

## 2024-08-15 DIAGNOSIS — I25.10 CORONARY ARTERY DISEASE INVOLVING NATIVE CORONARY ARTERY OF NATIVE HEART WITHOUT ANGINA PECTORIS: Chronic | ICD-10-CM

## 2024-08-15 DIAGNOSIS — I25.2 HISTORY OF ST ELEVATION MYOCARDIAL INFARCTION (STEMI): ICD-10-CM

## 2024-08-16 ENCOUNTER — TELEPHONE (OUTPATIENT)
Age: 74
End: 2024-08-16

## 2024-08-16 ENCOUNTER — OFFICE VISIT (OUTPATIENT)
Dept: CARDIOLOGY CLINIC | Facility: CLINIC | Age: 74
End: 2024-08-16
Payer: COMMERCIAL

## 2024-08-16 ENCOUNTER — TELEPHONE (OUTPATIENT)
Dept: OBGYN CLINIC | Facility: CLINIC | Age: 74
End: 2024-08-16

## 2024-08-16 VITALS
WEIGHT: 218 LBS | HEART RATE: 86 BPM | DIASTOLIC BLOOD PRESSURE: 70 MMHG | BODY MASS INDEX: 30.52 KG/M2 | SYSTOLIC BLOOD PRESSURE: 120 MMHG | HEIGHT: 71 IN

## 2024-08-16 DIAGNOSIS — I48.0 PAROXYSMAL A-FIB (HCC): Primary | ICD-10-CM

## 2024-08-16 DIAGNOSIS — M17.12 ARTHRITIS OF LEFT KNEE: ICD-10-CM

## 2024-08-16 DIAGNOSIS — I10 PRIMARY HYPERTENSION: ICD-10-CM

## 2024-08-16 DIAGNOSIS — I25.5 ISCHEMIC CARDIOMYOPATHY: ICD-10-CM

## 2024-08-16 DIAGNOSIS — Z01.818 PREOP TESTING: ICD-10-CM

## 2024-08-16 DIAGNOSIS — I25.10 CORONARY ARTERY DISEASE INVOLVING NATIVE CORONARY ARTERY OF NATIVE HEART WITHOUT ANGINA PECTORIS: Chronic | ICD-10-CM

## 2024-08-16 PROCEDURE — 99214 OFFICE O/P EST MOD 30 MIN: CPT | Performed by: INTERNAL MEDICINE

## 2024-08-16 RX ORDER — LISINOPRIL 5 MG/1
5 TABLET ORAL DAILY
Qty: 90 TABLET | Refills: 3 | Status: SHIPPED | OUTPATIENT
Start: 2024-08-16

## 2024-08-16 RX ORDER — POTASSIUM CHLORIDE 750 MG/1
CAPSULE, EXTENDED RELEASE ORAL
Qty: 360 CAPSULE | Refills: 1 | OUTPATIENT
Start: 2024-08-16

## 2024-08-16 RX ORDER — METOPROLOL SUCCINATE 25 MG/1
25 TABLET, EXTENDED RELEASE ORAL DAILY
Qty: 90 TABLET | Refills: 3 | Status: SHIPPED | OUTPATIENT
Start: 2024-08-16

## 2024-08-16 RX ORDER — POTASSIUM CHLORIDE 1500 MG/1
20 TABLET, EXTENDED RELEASE ORAL DAILY
Qty: 90 TABLET | Refills: 3 | Status: SHIPPED | OUTPATIENT
Start: 2024-08-16

## 2024-08-16 RX ORDER — METOPROLOL SUCCINATE 50 MG/1
50 TABLET, EXTENDED RELEASE ORAL DAILY
Qty: 90 TABLET | Refills: 3 | Status: SHIPPED | OUTPATIENT
Start: 2024-08-16 | End: 2024-08-16

## 2024-08-16 NOTE — TELEPHONE ENCOUNTER
Patient call about having knee surgery again on September 23, 2024 and would like to know how the patient should be taking   warfarin (COUMADIN) 5 mg tablet  before the surgery. Patient call to confirm how he should be taking this. Thank you

## 2024-08-16 NOTE — PATIENT INSTRUCTIONS
You do have a residual blockage in your right coronary artery which we are medically managing. It does not need intervention unless you develop symptoms such as chest pain or pressure. This article is much smaller than the one you had stented last year.

## 2024-08-16 NOTE — LETTER
Cardiology Pre Operative Clearance      PRE OPERATIVE CARDIAC RISK ASSESSMENT    08/16/24    Tian Garica  1950  [id number]    Case: [id number] Date/Time: 09/23/24 1010   Procedure: ARTHROPLASTY KNEE TOTAL and all associated procedures (Left: Knee)   Anesthesia type: Choice   Diagnosis: Arthritis of left knee [M17.12]   Pre-op diagnosis: Arthritis of left knee [M17.12]   Location: [facility] OR ROOM 01 / [facility]   Surgeons: Dot Galindo MD       No Cardiac Contraindication for Planned Surgical Procedures    Anticoagulation: yes, see office note    Physician Comment: No further work-up recommend preoperatively.     Electronically Signed: Seamus Lock MD

## 2024-08-16 NOTE — PROGRESS NOTES
Cardiology Outpatient Follow-Up Note - Tian Garcia 74 y.o. adult MRN: [id number]      Assessment/Plan:    1. Arthritis of left knee  - Ambulatory referral to Cardiology    2. Preop testing  No cardiac contraindication to proceed with TKA  His EKG is unchanged from baseline  He has chronic systolic CHF which is compensated. He is euvolemic. He has known CAD which is adequately medically managed as well as s/p PCI last year.   His LVEF was measured lower on recent echo but likely underestimated due to image quality and atrial fibrillation.    He is on warfarin for atrial fibrillation. Managed by PCP. My recommendation is to hold warfarin 5 days pre-op without bridge. Resume warfarin post-op. Maintain aspirin therapy perioperatively.     - Ambulatory referral to Cardiology    3. Paroxysmal A-fib (HCC)  Continue rate control with metoprolol XL 25 mg daily  Titration limited by sinus bradycardia when not in AF  - Echo follow up/limited w/ contrast if indicated; Future  - metoprolol succinate (TOPROL-XL) 25 mg 24 hr tablet; Take 1 tablet (25 mg total) by mouth daily  Dispense: 90 tablet; Refill: 3    4. Ischemic cardiomyopathy  Continue metoprolol XL 25 mg daily  Add lisinopril 5 mg daily  I do believe his recent EF of 37% is underestimated due to poor image quality and atrial fibrillation.  With addition of lisinopril, repeat echo in 3 months.  He appears euvolemic.  Continue torsemide 10 mg daily.  Reduce potassium to 20 mEq daily.    - lisinopril (ZESTRIL) 5 mg tablet; Take 1 tablet (5 mg total) by mouth daily  Dispense: 90 tablet; Refill: 3  - potassium chloride (Klor-Con M20) 20 mEq tablet; Take 1 tablet (20 mEq total) by mouth daily  Dispense: 90 tablet; Refill: 3  - Echo follow up/limited w/ contrast if indicated; Future  - metoprolol succinate (TOPROL-XL) 25 mg 24 hr tablet; Take 1 tablet (25 mg total) by mouth daily  Dispense: 90 tablet; Refill: 3    5. Primary hypertension  - potassium chloride (Klor-Con M20)  "20 mEq tablet; Take 1 tablet (20 mEq total) by mouth daily  Dispense: 90 tablet; Refill: 3    6. Coronary artery disease involving native coronary artery of native heart without angina pectoris  - metoprolol succinate (TOPROL-XL) 25 mg 24 hr tablet; Take 1 tablet (25 mg total) by mouth daily  Dispense: 90 tablet; Refill: 3      We will see Tian Garcia back in 6 month for routine follow-up.    Subjective:     HPI: Tian Garcia is a 74 y.o. year old adult  with paroxysmal atrial fibrillation, HFrEF 2/2 ICMP hypertension, moderate aortic stenosis, CKD 4, CAD and STEMI April 2023 with PCI to the proximal to mid LAD.   He presents today for follow-up.    He has no complaints today -- no chest pain or chest pressure. No dyspnea, no worsened LE edema, no orthopnea or PND. He states he has been able to work in the yard without any symptoms or concerns.     He did have orthostatic lightheadedness last month, seen by cardiology ROBERTO on 7/9/24 -- his torsemide was cut from 20 mg to 10 mg and he feels \"totally relieved\" of the lightheadedness.    A repeat echo was done in Aug 2024 which showed his LVEF reduced to 37%, after having recovered to 60% last year post-MI. I personally reviewed that 8/12/24 echo which has poor endocardial border definition and patient is in atrial fibrillation which may limit ability to accurately assess LVEF. His prior study 6/9/23 was contrast enhanced.       Cardiac Testing:    Echo 8/12/24  Interpretation Summary  Show Result Comparison     Left Ventricle: Left ventricular cavity size is moderately dilated. Wall thickness is normal. The left ventricular ejection fraction is 37% by biplane measurement. Systolic function is normal. There is moderate global hypokinesis.    IVS: Septal motion is not well visualized.    Right Ventricle: Right ventricular cavity size is mildly dilated. Systolic function is normal.    Left Atrium: The atrium is moderately dilated (42-48 mL/m2).    Right Atrium: The " atrium is mildly dilated.    Aortic Valve: The aortic valve is trileaflet. The leaflets are moderately thickened. The leaflets are moderately calcified. There is moderately reduced mobility. There is mild regurgitation. There is moderate stenosis. The aortic valve peak velocity is 2.36 m/s. The aortic valve mean gradient is 13 mmHg. The dimensionless velocity index is 0.32. The aortic valve area is 1.32 cm2. The stroke volume index is 24.20 ml/m2. The aortic valve velocity is increased due to stenosis but lower than expected due to the presence of decreased flow.    Mitral Valve: There is mild annular calcification. There is mild regurgitation.    Prior TTE study available for comparison. Prior study date: 6/9/2023. Changes noted when compared to prior study. Changes include: LVEF is now moderately reduced. .        Echo 6/9/23     Interpretation Summary  Show Result Comparison     Left Ventricle: Left ventricular cavity size is normal. The left ventricular ejection fraction is 55-60% by biplane measurement. Systolic function is normal.    The following segments are hypokinetic: basal inferior.    All other segments are normal.    Aortic Valve: There is moderate stenosis.     Marked recovery of LV systolic function compared to prior study dated 04/10/2022.           Cardiac Cath 4/9/23 Impression         Successful PCI w/ non-overlapping ROBERT x2 of sequential prox & mid LAD culprit lesions    Residual mid RCA 80% diffuse lesion    Echo 4/10/23 Interpretation Summary         Left Ventricle: Left ventricular cavity size is normal. Wall thickness is moderately increased. There is moderate concentric hypertrophy. The left ventricular ejection fraction is 30-35%. Global longitudinal strain is reduced at -6%. There is moderate global hypokinesis with severe hypokinesis of the mid anterior, mid anteroseptal, mid anterolateral, and all apical segments. There is regional variation of the inferior wall. Diastolic function is  "moderately abnormal, consistent with grade II (pseudonormal) relaxation.    Right Ventricle: Wall motion is abnormal. There is severe hypokinesis of the apical free wall.    Mitral Valve: There is mild regurgitation.    Aortic Valve: There is moderate stenosis. The aortic valve velocity is increased due to stenosis but lower than expected due to the presence of decreased flow.       Echo 6/9/23  Interpretation Summary     Left Ventricle: Left ventricular cavity size is normal. The left ventricular ejection fraction is 55-60% by biplane measurement. Systolic function is normal.    The following segments are hypokinetic: basal inferior.    All other segments are normal.    Aortic Valve: There is moderate stenosis.     Marked recovery of LV systolic function compared to prior study dated 04/10/2022.          EKGs, personally reviewed:  EKG in the office 9/28/2023 shows sinus bradycardia, 58 bpm, normal axis,  ms RBBB, otherwise normal intervals.  Abnormal study.    EKG 2/28/24 - sinus bradycardia, 52 bpm, normal axis, RBBB  ms. Abnormal study.     7/3/24 - atrial fibrillation, 82 bpm, RBBB, nonspecific T wave abnormalities, abnormal study    Relevant Labs & Results:  CMP 5/4/23 --  Cr 2.5, K 4.1  Lipid panel 4/9/23 -- , , HDL 45,  mg/dL -- on pravastatin 40 mg   LDL direct 4/9/23 -- 139 mg/dL     CMP 2/26/24 - Cr 2.17, GFR 31, K 4.2  Lipid panel 10/10/23 - LDL 90 mg/dL   CBC 1/3/24 - Hgb 12.7 g/dL      CMP 8/12/24 - K 4.8, Cr 2.09    ROS:  Review of Systems:  Review of Systems    Objective:     Vitals:   Vitals:    08/16/24 0917   BP: 120/70   BP Location: Left arm   Patient Position: Sitting   Cuff Size: Standard   Pulse: 86   Weight: 98.9 kg (218 lb)   Height: 5' 11\" (1.803 m)      Body surface area is 2.19 meters squared.  Wt Readings from Last 3 Encounters:   08/16/24 98.9 kg (218 lb)   08/12/24 99.3 kg (219 lb)   08/07/24 99.3 kg (219 lb)       Physical Exam:    General: Tian EVERETT" Radha is a well appearing adult, in no acute distress, sitting comfortably  HEENT: moist mucous membranes, EOMI  Neck:  No JVD, supple, trachea midline  Cardiovascular: unremarkable S1/S2, irregular rhythm, 2/6 SM  Pulmonary: normal respiratory effort, CTAB  Abdomen: soft and nondistended  Extremities: trace to +1 lower extremity edema. Warm and well perfused extremities.  Neuro: no focal motor deficits, AAOx3 (person, place, time)  Psych: Normal mood and affect, cooperative      Medications (at the START of this encounter):  Outpatient Medications Prior to Visit   Medication Sig Dispense Refill    ascorbic acid (VITAMIN C) 500 MG tablet Take 1 tablet (500 mg total) by mouth 2 (two) times a day 60 tablet 1    Aspirin 81 MG CAPS Take by mouth      atorvastatin (LIPITOR) 80 mg tablet TAKE 1 TABLET BY MOUTH EVERY EVENING 90 tablet 1    DULoxetine (CYMBALTA) 60 mg delayed release capsule TAKE 1 CAPSULE BY MOUTH EVERY DAY 90 capsule 1    ezetimibe (ZETIA) 10 mg tablet TAKE 1 TABLET BY MOUTH EVERY DAY 90 tablet 3    ferrous sulfate 324 (65 Fe) mg Take 1 tablet (324 mg total) by mouth daily before breakfast 60 tablet 1    folic acid (FOLVITE) 1 mg tablet Take 1 tablet (1 mg total) by mouth daily 30 tablet 1    gabapentin (NEURONTIN) 300 mg capsule TAKE 1 CAPSULE BY MOUTH DAILY AT BEDTIME 90 capsule 1    glucose blood (Accu-Chek Guide) test strip Use one new strip daily dx r73.01 100 strip 1    metoprolol succinate (TOPROL-XL) 25 mg 24 hr tablet Take 1 tablet (25 mg total) by mouth daily 90 tablet 1    mometasone (ELOCON) 0.1 % cream APPLY TO AFFECTED AREA TOPICALLY EVERY DAY 45 g 1    nitroglycerin (NITROSTAT) 0.4 mg SL tablet Place 1 tablet (0.4 mg total) under the tongue every 5 (five) minutes as needed for chest pain 30 tablet 0    potassium chloride (MICRO-K) 10 MEQ CR capsule Take 2 capsules (20 mEq total) by mouth 2 (two) times a day 180 capsule 1    tamsulosin (FLOMAX) 0.4 mg TAKE 1 CAPSULE BY MOUTH DAILY  WITH  "DINNER 90 capsule 1    tirzepatide (Mounjaro) 2.5 MG/0.5ML INJECT 0.5 ML (2.5 MG TOTAL) UNDER THE SKIN EVERY 7 DAYS 2 mL 1    torsemide (DEMADEX) 20 mg tablet TAKE 1 AND 1/2 TABLETS ALTERNATING WITH 1 TABLET DAILY AS DIRECTED (Patient taking differently: Take 10 mg by mouth PT taking 1/2 tablet daily (10mg) usually) 135 tablet 1    traMADol (Ultram) 50 mg tablet Take 1 tablet (50 mg total) by mouth every 8 (eight) hours as needed for moderate pain 90 tablet 0    warfarin (COUMADIN) 5 mg tablet TAKE ONE-HALF TABLET BY MOUTH  DAILY , EXCEPT TAKE 1 TABLET BY  MOUTH ON WEDNESDAY AND SATURDAY (Patient taking differently: TAKE ONE-HALF TABLET BY MOUTH  DAILY , EXCEPT TAKE 1 TABLET BY  MOUTH ON WEDNESDAY AND SATURDAY  Managed by Internal Medicine.) 58 tablet 3    zolpidem (AMBIEN) 10 mg tablet Take 1 tablet (10 mg total) by mouth daily at bedtime as needed for sleep 90 tablet 0     No facility-administered medications prior to visit.                 Time Spent:  Total time (face-to-face and non-face-to-face) spent on today's visit was 26 minutes. This includes preparation for the visits (i.e. reviewing test results), performance of a medically appropriate history and examination, and orders for medications, tests or other procedures. This time is exclusive of procedures performed and time spent teaching.        This note was completed in part utilizing [employer] voice recognition software. Grammatical errors, random word insertion, spelling mistakes, occasional wrong word or \"sound-alike\" substitutions and incomplete sentences may be an occasional consequence of the system secondary to software limitations, ambient noise and hardware issues. At the time of dictation, efforts were made to edit, clarify and /or correct errors.  Please read the chart carefully and recognize, using context, where substitutions have occurred.  If you have any questions or concerns about the context, text or information contained within " the body of this dictation, please contact myself, the provider, for further clarification.

## 2024-08-16 NOTE — TELEPHONE ENCOUNTER
Returned pt's call per message rec'd.  Pt had questions about the timing of his blood work since he will be away on vacation when it needs to be done.  Pt stated he may be able to find a [facility] near where he'll be and will have it done within the time-frame.  If not, he will have it done the day that he returns from vacation.

## 2024-08-19 ENCOUNTER — TELEPHONE (OUTPATIENT)
Dept: OBGYN CLINIC | Facility: HOSPITAL | Age: 74
End: 2024-08-19

## 2024-08-19 NOTE — H&P (VIEW-ONLY)
Internal Medicine Pre-Operative Evaluation:     Reason for Visit: Pre-operative Evaluation for Risk Stratification and Optimization    Patient ID: Tian Garcia is a 74 y.o. male.     Surgery: Arthroplasty of left knee  Referring Provider: Dr Galindo      Recommendations to Proceed withSurgery    Patient is considered to be Medium risk for Medium risk procedure.     After evaluation and discussion with patient with emphasis that all surgery has some degree of inherent risk it is determined this procedure is of acceptable risk  medically.    Patient may proceed with planned procedure      Assessment    Pre-operative Medical Evaluation for planned surgery  Recommendations as listed in PLAN section below  Contact surgical nurse  navigator with any questions regarding preoperative plan or schedule.      Problem List Items Addressed This Visit          Unprioritized    Factor V Leiden mutation (HCC)     Hold warfarin x 5 days prior to surgery  Should be bridged with lovenox  Presurgery anticougulation plan as follows:  Stop Warfarin take last dose on on 9/19/2024  Resume coumadin POD 0 evening of surgery  Will continue ASA w/o interruption  Lovenox deferred due to renal fxn egfr of 28         Primary osteoarthritis of left knee     Failed outpatient conservative measures  Elected to undergo total joint arthroplasty         Paroxysmal A-fib ([facility])     Hold warfarin as instructed  Continue metoprolol as prescribed         History of DVT of lower extremity     Takes warfarin as above         Stage 3b chronic kidney disease (CKD) ([facility])     Lab Results   Component Value Date    EGFR 31 (L) 02/26/2024    EGFR 33 (L) 01/03/2024    EGFR 32 (L) 01/03/2024    CREATININE 2.17 (H) 02/26/2024    CREATININE 2.10 (H) 01/03/2024    CREATININE 2.16 (H) 01/03/2024   Level 3b  Baseline as above  F/b nephrology will see them next week before surgery  Avoid nephrotoxic medications  Avoid hypotension in the post operative  setting  Monitor bmp             History of ST elevation myocardial infarction (STEMI)     ROBERT x2 to prox /mid LAD 2023; 80% mid RCA disease  Cont ASA throughout perioperative period per cardiology recommendations  Cleared 2/28/24         Moderate aortic stenosis     Last echo ef 55-60%/moderate AS  Cleared by cards for previous sx  Monitor volume status carefully          Other Visit Diagnoses       Preoperative clearance    -  Primary                 Plan:     1. Further preoperative workup as follows:   - none no further testing required may proceed with surgery    2. Medication Management/Recommendations:       3. Patient requires further consultation with:   cardiology nephrology    4. Discharge Planning / Barriers to Discharge  none identified - patients has post discharge therapy plan in place, transportation arranged for discharge day, adequate family support at home to assist with discharge to home.        Subjective:           History of Present Illness:     Tian Garcia is a 74 y.o. male who presents to the office today for a preoperative consultation at the request of surgeon. The patient understands this is an elective procedure and not emergent. They are electing to undergo planned procedure with an understanding that all surgery has inherent risk. They have worked with their surgeon and failed conservative treatment measures. Today they present for preoperative risk assessment and recommendations for optimization in preparation for surgery.    Pt seen in surgical optimization center for upcoming proposed surgery. They have failed previous conservative measures and have elected surgical intervention.     Had previous Right arthroplasty which went uneventfully and did well post op.    Pt meets presurgical lab and BMI optimization goals, except Current GFR is <60. We will hold all nephrotoxic medications and monitor bmp closely in the post operative setting avoiding hypotension if at all possible. Pt  "is followed by nephrology.       Upon interview questioning patient is able to perform greater than 4 METs workload in daily life without any reported cardio-pulmonary symptoms. Pt has a h/o PAF/Aflutter and takes warfarin d/t same as well as Factor V deficiency. See pre-post sx AC plan as above and in AVS. He also has a h/o CAD, s/p catheterization 2023 which included ROBERT x2 to prox/mid LAD, 80% mid RCA, he was cleared by CARDIOLOGY WHO RECOMMENDS CONTINUING ASA THROUGHOUT THE PERIOPERATIVE PERIOD.            ROS: No TIA's or unusual headaches, no dysphagia.  No prolonged cough. No dyspnea or chest pain on exertion.  No abdominal pain, change in bowel habits, black or bloody stools.  No urinary tract or BPH symptoms.  Positive reported pain in arthritic joint. Positive difficulty with gait. No skin rashes or issues.      Objective:    /70   Pulse (!) 52   Ht 5' 11\" (1.803 m)   Wt 98.4 kg (217 lb)   SpO2 98%   BMI 30.27 kg/m²       General Appearance: no distress, conversive  HEENT: PERRLA, conjuctiva normal; oropharynx clear; mucous membranes moist;   Neck:  Supple, no lymphadenopathy or thyromegaly  Lungs: breath sounds normal, normal respiratory effort, no retractions, expiratory effort normal  CV: normal heart sounds S1/S2, PMI normal   ABD: soft non tender, no masses , no hepatic or splenomegaly  EXT: DP pulses intact, no lymphadenopathy, no edema  Skin: normal turgor, normal texture, no rash  Psych: affect normal, mood normal  Neuro: AAOx3        The following portions of the patient's history were reviewed and updated as appropriate: allergies, current medications, past family history, past medical history, past social history, past surgical history and problem list.     Past History:       Past Medical History:   Diagnosis Date    Acute venous embolism and thrombosis of deep vessels of proximal lower extremity (HCC)     Last assessed: 5/18/15    JANEL (acute kidney injury) (HCC)     Arthritis     " Cellulitis     LE    Chronic kidney disease 03/2020    Acute Kidney Injury    CPAP (continuous positive airway pressure) dependence     Factor V Leiden (HCC)     Forgetfulness     Gross hematuria 05/17/2022    Headache(784.0) 03/2022    Never till cervical  spine surgery    Heart murmur 03/2020    Told when in hospital    History of transfusion     [place] (hard of hearing)     Hypoalbuminemia 05/11/2022    Other acute osteomyelitis, other site (HCC) 01/03/2023    Paroxysmal atrial fibrillation (HCC)     Last assessed: 11/2/15    Sleep apnea     Past Surgical History:   Procedure Laterality Date    BACK SURGERY      Lower    CARDIAC CATHETERIZATION N/A 04/09/2023    Procedure: Cardiac pci;  Surgeon: Bird Cast MD;  [facility]: [facility];  Service: Cardiology    CARDIAC CATHETERIZATION N/A 04/09/2023    Procedure: Cardiac Coronary Angiogram;  Surgeon: Bird Cast MD;  [facility]: [facility];  Service: Cardiology    CATARACT EXTRACTION      COLON SURGERY      COLONOSCOPY      INCISION AND DRAINAGE POSTERIOR SPINE N/A 04/01/2022    Procedure: Posterior cervical evacuation of postoperative collection and debridement with placement of drains C3-T1;  Surgeon: Rajeev Garcia MD;  Location: [facility];  Service: Neurosurgery    IR BIOPSY KIDNEY RANDOM  05/26/2022    IR TUNNELED DIALYSIS CATHETER PLACEMENT  05/23/2022    IR TUNNELED DIALYSIS CATHETER REMOVAL  06/02/2022    UT ARTHRD ANT INTERBODY MIN DSC CRV BELOW C2 N/A 03/11/2022    Procedure: Anterior cervical discectomy and fixation fusion C5/6 and C6/7; Posterior cervical decompression and instrumented fusion C3-T1;  Surgeon: Rajeev Garcia MD;  Location: [facility];  Service: Neurosurgery    UT ARTHRP KNE CONDYLE&PLATU MEDIAL&LAT COMPARTMENTS Right 4/8/2024    Procedure: ARTHROPLASTY KNEE TOTAL and all associated procedures;  Surgeon: Dot Galindo MD;  Location: [facility];  Service: Orthopedics    RENAL BIOPSY  6/2022     SPINE SURGERY  03/11/2022    Cervical myelopathy/ cervical fusion          Social History     Tobacco Use    Smoking status: Never    Smokeless tobacco: Never   Vaping Use    Vaping status: Never Used   Substance Use Topics    Alcohol use: Not Currently    Drug use: No     Family History   Problem Relation Age of Onset    Lung cancer Mother     Hearing loss Father     Emphysema Sister     Heart attack Brother     Atrial fibrillation Brother     Clotting disorder Son           Allergies:     Allergies   Allergen Reactions    Morphine GI Intolerance    Vitamin K Other (See Comments)     On coumadin-patient sensitive to vitamin K amounts in meds etc.        Current Medications:     Current Outpatient Medications   Medication Instructions    ascorbic acid (VITAMIN C) 500 mg, Oral, 2 times daily    Aspirin 81 MG CAPS Oral    atorvastatin (LIPITOR) 80 mg, Oral, Every evening    DULoxetine (CYMBALTA) 60 mg delayed release capsule TAKE 1 CAPSULE BY MOUTH EVERY DAY    ezetimibe (ZETIA) 10 mg, Oral, Daily    ferrous sulfate 324 mg, Oral, Daily before breakfast    folic acid (FOLVITE) 1 mg, Oral, Daily    gabapentin (NEURONTIN) 300 mg capsule TAKE 1 CAPSULE BY MOUTH DAILY AT BEDTIME    glucose blood (Accu-Chek Guide) test strip Use one new strip daily dx r73.01    lisinopril (ZESTRIL) 5 mg, Oral, Daily    metoprolol succinate (TOPROL-XL) 25 mg, Oral, Daily    mometasone (ELOCON) 0.1 % cream APPLY TO AFFECTED AREA TOPICALLY EVERY DAY    nitroglycerin (NITROSTAT) 0.4 mg, Sublingual, Every 5 minutes PRN    potassium chloride (Klor-Con M20) 20 mEq tablet 20 mEq, Oral, Daily    tamsulosin (FLOMAX) 0.4 mg, Oral, Daily with dinner    tirzepatide (Mounjaro) 2.5 MG/0.5ML INJECT 0.5 ML (2.5 MG TOTAL) UNDER THE SKIN EVERY 7 DAYS    torsemide (DEMADEX) 20 mg tablet TAKE 1 AND 1/2 TABLETS ALTERNATING WITH 1 TABLET DAILY AS DIRECTED    traMADol (ULTRAM) 50 mg, Oral, Every 8 hours PRN    warfarin (COUMADIN) 5 mg tablet TAKE ONE-HALF TABLET  "BY MOUTH  DAILY , EXCEPT TAKE 1 TABLET BY  MOUTH ON WEDNESDAY AND SATURDAY    zolpidem (AMBIEN) 10 mg, Oral, Daily at bedtime PRN           PRE-OP WORKSHEET DATA    Assessment of Pre-Operative Risks     [facility] Quality Hard Stops:  BMI (<40) : Estimated body mass index is 30.27 kg/m² as calculated from the following:    Height as of this encounter: 5' 11\" (1.803 m).    Weight as of this encounter: 98.4 kg (217 lb).    Hgb ( >11):   Lab Results   Component Value Date    HGB 13.6 07/02/2024     HbA1c (<7.5) :   Lab Results   Component Value Date    HGBA1C 6.2 (H) 04/09/2023     GFR (>60) (Less then 45 = Nephrology consult):  28 ml/min      Active Decompensated Chronic Conditions which would delay surgery  No acutely decompensated medical issues such as recent CVA, MI, new onset arrhythmia, severe aortic stenosis, CHF, uncontrolled COPD       Exercise Capacity: (if less the 4 mets consider functional assessment via cardiac stress testing or consultation)    Able to walk 2 blocks without symptoms?: Yes  Able to walk 1 flights without symptoms?: Yes    Assessment of intra and post operative respiratory, hemodynamic and thrombotic risks     Prior Anesthesia Reactions: No     Personal history of venous thromboembolic disease? Yes, multi- Leyden Factor def    History of steroid use > 5 mg for >2 weeks within last year? No      The patient's risk factors for cardiac complications include :  serum Cr > 2.0 mg/dL or GFR < 30    Tian Garcia has an IN HOSPITAL cardiac risk of RCI RISK CLASS II (1 risk factor, risk of major cardiac compl. appr. 1.3%) based on RCRI calculator    Cardiac Risk Estimation: per the Revised Cardiac Risk Index (Circ. 100:1043, 1999),        Pre-Op Data Reviewed:       Laboratory Results: I have personally reviewed the pertinent laboratory results/reports     EKG:I have personally reviewed pertinent reports.  . I personally reviewed and interpreted available tracings in the electronic medical " record    sinus bradycardia, 52 bpm, normal axis, RBBB  ms. Abnormal study.        Specimen Collected: 02/28/24 11:02          OLD RECORDS: reviewed old records in the chart review section if EHR on day of visit.    Previous cardiopulmonary studies within the past year:  Echocardiogram: yes, 2023 ef 55-60% moderate AS  Cardiac Catheterization: yes, ROBERT x 2 prox/mid LAD/80% RCA  Stress Test: no      Time of visit including pre-visit chart review, visit and post-visit coordination of plan and care , review of pre-surgical lab work, preparation and time spent documenting note in electronic medical record, time spent face-to-face in physical examination answering patient questions by care team 35 minutes             [facility]

## 2024-08-19 NOTE — PROGRESS NOTES
Internal Medicine Pre-Operative Evaluation:     Reason for Visit: Pre-operative Evaluation for Risk Stratification and Optimization    Patient ID: Tian Garcia is a 74 y.o. male.     Surgery: Arthroplasty of left knee  Referring Provider: Dr Galindo      Recommendations to Proceed withSurgery    Patient is considered to be Medium risk for Medium risk procedure.     After evaluation and discussion with patient with emphasis that all surgery has some degree of inherent risk it is determined this procedure is of acceptable risk  medically.    Patient may proceed with planned procedure      Assessment    Pre-operative Medical Evaluation for planned surgery  Recommendations as listed in PLAN section below  Contact surgical nurse  navigator with any questions regarding preoperative plan or schedule.      Problem List Items Addressed This Visit          Unprioritized    Factor V Leiden mutation (HCC)     Hold warfarin x 5 days prior to surgery  Should be bridged with lovenox  Presurgery anticougulation plan as follows:  Stop Warfarin take last dose on on 9/19/2024  Resume coumadin POD 0 evening of surgery  Will continue ASA w/o interruption  Lovenox deferred due to renal fxn egfr of 28         Primary osteoarthritis of left knee     Failed outpatient conservative measures  Elected to undergo total joint arthroplasty         Paroxysmal A-fib ([facility])     Hold warfarin as instructed  Continue metoprolol as prescribed         History of DVT of lower extremity     Takes warfarin as above         Stage 3b chronic kidney disease (CKD) ([facility])     Lab Results   Component Value Date    EGFR 31 (L) 02/26/2024    EGFR 33 (L) 01/03/2024    EGFR 32 (L) 01/03/2024    CREATININE 2.17 (H) 02/26/2024    CREATININE 2.10 (H) 01/03/2024    CREATININE 2.16 (H) 01/03/2024   Level 3b  Baseline as above  F/b nephrology will see them next week before surgery  Avoid nephrotoxic medications  Avoid hypotension in the post operative  setting  Monitor bmp             History of ST elevation myocardial infarction (STEMI)     ROBERT x2 to prox /mid LAD 2023; 80% mid RCA disease  Cont ASA throughout perioperative period per cardiology recommendations  Cleared 2/28/24         Moderate aortic stenosis     Last echo ef 55-60%/moderate AS  Cleared by cards for previous sx  Monitor volume status carefully          Other Visit Diagnoses       Preoperative clearance    -  Primary                 Plan:     1. Further preoperative workup as follows:   - none no further testing required may proceed with surgery    2. Medication Management/Recommendations:       3. Patient requires further consultation with:   cardiology nephrology    4. Discharge Planning / Barriers to Discharge  none identified - patients has post discharge therapy plan in place, transportation arranged for discharge day, adequate family support at home to assist with discharge to home.        Subjective:           History of Present Illness:     Tian Garcia is a 74 y.o. male who presents to the office today for a preoperative consultation at the request of surgeon. The patient understands this is an elective procedure and not emergent. They are electing to undergo planned procedure with an understanding that all surgery has inherent risk. They have worked with their surgeon and failed conservative treatment measures. Today they present for preoperative risk assessment and recommendations for optimization in preparation for surgery.    Pt seen in surgical optimization center for upcoming proposed surgery. They have failed previous conservative measures and have elected surgical intervention.     Had previous Right arthroplasty which went uneventfully and did well post op.    Pt meets presurgical lab and BMI optimization goals, except Current GFR is <60. We will hold all nephrotoxic medications and monitor bmp closely in the post operative setting avoiding hypotension if at all possible. Pt  "is followed by nephrology.       Upon interview questioning patient is able to perform greater than 4 METs workload in daily life without any reported cardio-pulmonary symptoms. Pt has a h/o PAF/Aflutter and takes warfarin d/t same as well as Factor V deficiency. See pre-post sx AC plan as above and in AVS. He also has a h/o CAD, s/p catheterization 2023 which included ROBERT x2 to prox/mid LAD, 80% mid RCA, he was cleared by CARDIOLOGY WHO RECOMMENDS CONTINUING ASA THROUGHOUT THE PERIOPERATIVE PERIOD.            ROS: No TIA's or unusual headaches, no dysphagia.  No prolonged cough. No dyspnea or chest pain on exertion.  No abdominal pain, change in bowel habits, black or bloody stools.  No urinary tract or BPH symptoms.  Positive reported pain in arthritic joint. Positive difficulty with gait. No skin rashes or issues.      Objective:    /70   Pulse (!) 52   Ht 5' 11\" (1.803 m)   Wt 98.4 kg (217 lb)   SpO2 98%   BMI 30.27 kg/m²       General Appearance: no distress, conversive  HEENT: PERRLA, conjuctiva normal; oropharynx clear; mucous membranes moist;   Neck:  Supple, no lymphadenopathy or thyromegaly  Lungs: breath sounds normal, normal respiratory effort, no retractions, expiratory effort normal  CV: normal heart sounds S1/S2, PMI normal   ABD: soft non tender, no masses , no hepatic or splenomegaly  EXT: DP pulses intact, no lymphadenopathy, no edema  Skin: normal turgor, normal texture, no rash  Psych: affect normal, mood normal  Neuro: AAOx3        The following portions of the patient's history were reviewed and updated as appropriate: allergies, current medications, past family history, past medical history, past social history, past surgical history and problem list.     Past History:       Past Medical History:   Diagnosis Date    Acute venous embolism and thrombosis of deep vessels of proximal lower extremity (HCC)     Last assessed: 5/18/15    JANEL (acute kidney injury) (HCC)     Arthritis     " Cellulitis     LE    Chronic kidney disease 03/2020    Acute Kidney Injury    CPAP (continuous positive airway pressure) dependence     Factor V Leiden (HCC)     Forgetfulness     Gross hematuria 05/17/2022    Headache(784.0) 03/2022    Never till cervical  spine surgery    Heart murmur 03/2020    Told when in hospital    History of transfusion     [place] (hard of hearing)     Hypoalbuminemia 05/11/2022    Other acute osteomyelitis, other site (HCC) 01/03/2023    Paroxysmal atrial fibrillation (HCC)     Last assessed: 11/2/15    Sleep apnea     Past Surgical History:   Procedure Laterality Date    BACK SURGERY      Lower    CARDIAC CATHETERIZATION N/A 04/09/2023    Procedure: Cardiac pci;  Surgeon: Bird Cast MD;  [facility]: [facility];  Service: Cardiology    CARDIAC CATHETERIZATION N/A 04/09/2023    Procedure: Cardiac Coronary Angiogram;  Surgeon: Bird Cast MD;  [facility]: [facility];  Service: Cardiology    CATARACT EXTRACTION      COLON SURGERY      COLONOSCOPY      INCISION AND DRAINAGE POSTERIOR SPINE N/A 04/01/2022    Procedure: Posterior cervical evacuation of postoperative collection and debridement with placement of drains C3-T1;  Surgeon: Rajeev Garcia MD;  Location: [facility];  Service: Neurosurgery    IR BIOPSY KIDNEY RANDOM  05/26/2022    IR TUNNELED DIALYSIS CATHETER PLACEMENT  05/23/2022    IR TUNNELED DIALYSIS CATHETER REMOVAL  06/02/2022    NY ARTHRD ANT INTERBODY MIN DSC CRV BELOW C2 N/A 03/11/2022    Procedure: Anterior cervical discectomy and fixation fusion C5/6 and C6/7; Posterior cervical decompression and instrumented fusion C3-T1;  Surgeon: Rajeev Garcia MD;  Location: [facility];  Service: Neurosurgery    NY ARTHRP KNE CONDYLE&PLATU MEDIAL&LAT COMPARTMENTS Right 4/8/2024    Procedure: ARTHROPLASTY KNEE TOTAL and all associated procedures;  Surgeon: Dot Galindo MD;  Location: [facility];  Service: Orthopedics    RENAL BIOPSY  6/2022     SPINE SURGERY  03/11/2022    Cervical myelopathy/ cervical fusion          Social History     Tobacco Use    Smoking status: Never    Smokeless tobacco: Never   Vaping Use    Vaping status: Never Used   Substance Use Topics    Alcohol use: Not Currently    Drug use: No     Family History   Problem Relation Age of Onset    Lung cancer Mother     Hearing loss Father     Emphysema Sister     Heart attack Brother     Atrial fibrillation Brother     Clotting disorder Son           Allergies:     Allergies   Allergen Reactions    Morphine GI Intolerance    Vitamin K Other (See Comments)     On coumadin-patient sensitive to vitamin K amounts in meds etc.        Current Medications:     Current Outpatient Medications   Medication Instructions    ascorbic acid (VITAMIN C) 500 mg, Oral, 2 times daily    Aspirin 81 MG CAPS Oral    atorvastatin (LIPITOR) 80 mg, Oral, Every evening    DULoxetine (CYMBALTA) 60 mg delayed release capsule TAKE 1 CAPSULE BY MOUTH EVERY DAY    ezetimibe (ZETIA) 10 mg, Oral, Daily    ferrous sulfate 324 mg, Oral, Daily before breakfast    folic acid (FOLVITE) 1 mg, Oral, Daily    gabapentin (NEURONTIN) 300 mg capsule TAKE 1 CAPSULE BY MOUTH DAILY AT BEDTIME    glucose blood (Accu-Chek Guide) test strip Use one new strip daily dx r73.01    lisinopril (ZESTRIL) 5 mg, Oral, Daily    metoprolol succinate (TOPROL-XL) 25 mg, Oral, Daily    mometasone (ELOCON) 0.1 % cream APPLY TO AFFECTED AREA TOPICALLY EVERY DAY    nitroglycerin (NITROSTAT) 0.4 mg, Sublingual, Every 5 minutes PRN    potassium chloride (Klor-Con M20) 20 mEq tablet 20 mEq, Oral, Daily    tamsulosin (FLOMAX) 0.4 mg, Oral, Daily with dinner    tirzepatide (Mounjaro) 2.5 MG/0.5ML INJECT 0.5 ML (2.5 MG TOTAL) UNDER THE SKIN EVERY 7 DAYS    torsemide (DEMADEX) 20 mg tablet TAKE 1 AND 1/2 TABLETS ALTERNATING WITH 1 TABLET DAILY AS DIRECTED    traMADol (ULTRAM) 50 mg, Oral, Every 8 hours PRN    warfarin (COUMADIN) 5 mg tablet TAKE ONE-HALF TABLET  "BY MOUTH  DAILY , EXCEPT TAKE 1 TABLET BY  MOUTH ON WEDNESDAY AND SATURDAY    zolpidem (AMBIEN) 10 mg, Oral, Daily at bedtime PRN           PRE-OP WORKSHEET DATA    Assessment of Pre-Operative Risks     [facility] Quality Hard Stops:  BMI (<40) : Estimated body mass index is 30.27 kg/m² as calculated from the following:    Height as of this encounter: 5' 11\" (1.803 m).    Weight as of this encounter: 98.4 kg (217 lb).    Hgb ( >11):   Lab Results   Component Value Date    HGB 13.6 07/02/2024     HbA1c (<7.5) :   Lab Results   Component Value Date    HGBA1C 6.2 (H) 04/09/2023     GFR (>60) (Less then 45 = Nephrology consult):  28 ml/min      Active Decompensated Chronic Conditions which would delay surgery  No acutely decompensated medical issues such as recent CVA, MI, new onset arrhythmia, severe aortic stenosis, CHF, uncontrolled COPD       Exercise Capacity: (if less the 4 mets consider functional assessment via cardiac stress testing or consultation)    Able to walk 2 blocks without symptoms?: Yes  Able to walk 1 flights without symptoms?: Yes    Assessment of intra and post operative respiratory, hemodynamic and thrombotic risks     Prior Anesthesia Reactions: No     Personal history of venous thromboembolic disease? Yes, multi- Leyden Factor def    History of steroid use > 5 mg for >2 weeks within last year? No      The patient's risk factors for cardiac complications include :  serum Cr > 2.0 mg/dL or GFR < 30    Tian Garcia has an IN HOSPITAL cardiac risk of RCI RISK CLASS II (1 risk factor, risk of major cardiac compl. appr. 1.3%) based on RCRI calculator    Cardiac Risk Estimation: per the Revised Cardiac Risk Index (Circ. 100:1043, 1999),        Pre-Op Data Reviewed:       Laboratory Results: I have personally reviewed the pertinent laboratory results/reports     EKG:I have personally reviewed pertinent reports.  . I personally reviewed and interpreted available tracings in the electronic medical " record    sinus bradycardia, 52 bpm, normal axis, RBBB  ms. Abnormal study.        Specimen Collected: 02/28/24 11:02          OLD RECORDS: reviewed old records in the chart review section if EHR on day of visit.    Previous cardiopulmonary studies within the past year:  Echocardiogram: yes, 2023 ef 55-60% moderate AS  Cardiac Catheterization: yes, ROBERT x 2 prox/mid LAD/80% RCA  Stress Test: no      Time of visit including pre-visit chart review, visit and post-visit coordination of plan and care , review of pre-surgical lab work, preparation and time spent documenting note in electronic medical record, time spent face-to-face in physical examination answering patient questions by care team 35 minutes             [facility]

## 2024-08-19 NOTE — TELEPHONE ENCOUNTER
Preoperative Elective Admission Assessment- spoke to patient.     TKA April 2024     Living Situation:    Who does pt live with: spouse    Home Layout: Pt reports living in a multilevel home with first floor walk in shower, has shower chair on first floor bath.                     Steps: 4 to enter with double rail.     First Floor Setup:   Is there a bathroom: Yes  Where would pt sleep: couch     DME: rolling walker and cane  We discussed clearing pathways in the home and making sure there is accessibly to use the walker, for example, removing throw rugs.      Patient's Current Level of Function: Ambulates with cane and ADLs: Independent    Post-op Caregiver: spouse  Currently receive any HHC/aides/community supports: No     Post-op Transport: spouse  To/from hospital: spouse  To/from PT 2-3x/week: spouse or sons   Uses community transport now: No     Outpatient Physical Therapy Site:  Site: [address] [place] [address]  pre and post-op appts scheduled? No     Medication Management: self  Preferred Pharmacy for Post-op Medications: [id number] - [place], [place] - [address] [[id number]]   Blood Management Vitamin Regimen: Pt confirms taking as prescribed  Post-op anticoagulant: to be determined by surgical team postoperatively     DC Plan: Pt plans to be discharged home    Barriers to DC identified preoperatively: none identified    BMI: 30.40    Patient Education:  Pt educated on post-op pain, early mobilization (POD0), LOS goals, OP PT goals, and preoperative bathing. Patient educated that our goal is to appropriately discharge patient based off their post-op function while striving to maintain maximal independence. The goal is to discharge patient to home and for them to attend outpatient physical therapy.

## 2024-08-19 NOTE — PATIENT INSTRUCTIONS
Contact surgical nurse  navigator with any questions regarding preoperative plan or schedule.  Stop all over the counter supplements, herbal, naturopathic  medications for 1 week prior to surgery UNLESS prescribed by your surgeon  Hold NSAIDS (i.e. advil, alleve, motrin, ibuprofen, celebrex) minimum 7 days prior to surgery  Hold Asprin minimum 7 days prior to surgery unless instructed otherwise by your physician  Recommend using Tylenol ( acetaminophen ) 500mg every eight hours during the first week post discharge in conjunction with any additional pain medicine prescribed by your surgeon  Use bowel medicines prescribed by your surgeon ( colace) daily post op during the first 1-2 weeks to avoid post operative constipation issues  Practice deep breathing and blood clot prevention exercises starting 2 weeks before surgery and continue for minimum of 2 weeks after surgery. Examples can be found in the following video link: https://www.[employer].com/watch?v=[email or web address]  Follow presurgical medication instructions provided by preadmission nursing team reviewed during your presurgery phone call  Presurgery anticougulation plan as follows:  Stop Warfarin  by taking last dose on on 9/19/2024  Resume Warfarin the evening of surgery  Call [phone] with any post discharge concerns or medical issues

## 2024-08-24 ENCOUNTER — TELEPHONE (OUTPATIENT)
Dept: OBGYN CLINIC | Facility: HOSPITAL | Age: 74
End: 2024-08-24

## 2024-08-24 NOTE — TELEPHONE ENCOUNTER
Caller: patient    Doctor: Mateo    Reason for call: Patient checking to be sure lab orders were sent to [facility] for pre surgery labwork. Advised the order is in as [facility].    Call back#: n/a

## 2024-08-25 DIAGNOSIS — I25.10 CORONARY ARTERY DISEASE INVOLVING NATIVE CORONARY ARTERY OF NATIVE HEART WITHOUT ANGINA PECTORIS: Chronic | ICD-10-CM

## 2024-08-26 DIAGNOSIS — N13.8 BPH WITH OBSTRUCTION/LOWER URINARY TRACT SYMPTOMS: ICD-10-CM

## 2024-08-26 DIAGNOSIS — N40.1 BPH WITH OBSTRUCTION/LOWER URINARY TRACT SYMPTOMS: ICD-10-CM

## 2024-08-26 RX ORDER — EZETIMIBE 10 MG/1
10 TABLET ORAL DAILY
Qty: 90 TABLET | Refills: 3 | Status: SHIPPED | OUTPATIENT
Start: 2024-08-26

## 2024-08-26 RX ORDER — TAMSULOSIN HYDROCHLORIDE 0.4 MG/1
0.4 CAPSULE ORAL
Qty: 90 CAPSULE | Refills: 1 | Status: SHIPPED | OUTPATIENT
Start: 2024-08-26

## 2024-08-26 NOTE — TELEPHONE ENCOUNTER
Medication: tamsulosin (FLOMAX) 0.4 mg     Dose/Frequency: TAKE 1 CAPSULE BY MOUTH DAILY WITH DINNER     Quantity: 90 capsule     Pharmacy: [id number] - [place], [place] - [address]     DO NOT SEND TO [facility]    Office:   [x] PCP/Provider -   [] Speciality/Provider -     Does the patient have enough for 3 days?   [] Yes   [x] No - Send as HP to POD

## 2024-08-27 ENCOUNTER — CONSULT (OUTPATIENT)
Dept: ANESTHESIOLOGY | Facility: CLINIC | Age: 74
End: 2024-08-27
Payer: COMMERCIAL

## 2024-08-27 ENCOUNTER — OFFICE VISIT (OUTPATIENT)
Age: 74
End: 2024-08-27
Payer: COMMERCIAL

## 2024-08-27 VITALS
SYSTOLIC BLOOD PRESSURE: 118 MMHG | WEIGHT: 217 LBS | HEIGHT: 71 IN | OXYGEN SATURATION: 98 % | DIASTOLIC BLOOD PRESSURE: 70 MMHG | BODY MASS INDEX: 30.38 KG/M2 | HEART RATE: 52 BPM

## 2024-08-27 DIAGNOSIS — I35.0 MODERATE AORTIC STENOSIS: ICD-10-CM

## 2024-08-27 DIAGNOSIS — Z96.651 STATUS POST TOTAL KNEE REPLACEMENT USING CEMENT, RIGHT: Primary | ICD-10-CM

## 2024-08-27 DIAGNOSIS — M17.12 PRIMARY OSTEOARTHRITIS OF LEFT KNEE: ICD-10-CM

## 2024-08-27 DIAGNOSIS — I25.10 ATHEROSCLEROSIS OF NATIVE CORONARY ARTERY OF NATIVE HEART WITHOUT ANGINA PECTORIS: ICD-10-CM

## 2024-08-27 DIAGNOSIS — N02.B9 IGA NEPHROPATHY DETERMINED BY BIOPSY OF KIDNEY: ICD-10-CM

## 2024-08-27 DIAGNOSIS — Z01.89 ENCOUNTER FOR GERIATRIC ASSESSMENT: ICD-10-CM

## 2024-08-27 DIAGNOSIS — I25.5 ISCHEMIC CARDIOMYOPATHY: ICD-10-CM

## 2024-08-27 DIAGNOSIS — N18.32 STAGE 3B CHRONIC KIDNEY DISEASE (CKD) (HCC): ICD-10-CM

## 2024-08-27 DIAGNOSIS — D63.1 ANEMIA DUE TO STAGE 4 CHRONIC KIDNEY DISEASE  (HCC): ICD-10-CM

## 2024-08-27 DIAGNOSIS — Z01.818 PREOP TESTING: ICD-10-CM

## 2024-08-27 DIAGNOSIS — Z98.890 STATUS POST CATHETER ABLATION OF ATRIAL FLUTTER: ICD-10-CM

## 2024-08-27 DIAGNOSIS — D68.51 FACTOR V LEIDEN MUTATION (HCC): ICD-10-CM

## 2024-08-27 DIAGNOSIS — M17.12 ARTHRITIS OF LEFT KNEE: ICD-10-CM

## 2024-08-27 DIAGNOSIS — G47.33 OSA (OBSTRUCTIVE SLEEP APNEA): ICD-10-CM

## 2024-08-27 DIAGNOSIS — Z86.718 HISTORY OF DVT OF LOWER EXTREMITY: ICD-10-CM

## 2024-08-27 DIAGNOSIS — N18.4 ANEMIA DUE TO STAGE 4 CHRONIC KIDNEY DISEASE  (HCC): ICD-10-CM

## 2024-08-27 DIAGNOSIS — Z01.818 PREOPERATIVE CLEARANCE: Primary | ICD-10-CM

## 2024-08-27 DIAGNOSIS — I10 PRIMARY HYPERTENSION: ICD-10-CM

## 2024-08-27 DIAGNOSIS — I48.0 PAROXYSMAL A-FIB (HCC): ICD-10-CM

## 2024-08-27 PROCEDURE — G2211 COMPLEX E/M VISIT ADD ON: HCPCS | Performed by: INTERNAL MEDICINE

## 2024-08-27 PROCEDURE — 99213 OFFICE O/P EST LOW 20 MIN: CPT | Performed by: NURSE PRACTITIONER

## 2024-08-27 PROCEDURE — 99215 OFFICE O/P EST HI 40 MIN: CPT | Performed by: INTERNAL MEDICINE

## 2024-08-27 NOTE — PROGRESS NOTES
[facility]      Reason for Visit: Pre-operative Evaluation for Risk Stratification and Optimization    Patient ID: Tian Garcia is a 74 y.o. male referred to SOC for pre-surgery geriatric screening 2.2 age   Other consult concerns include:  surgical optimization & BEST program   He is scheduled on     Case: [id number] Date/Time: 09/23/24 1010   Procedure: ARTHROPLASTY KNEE TOTAL and all associated procedures (Left: Knee)   Anesthesia type: Choice   Diagnosis: Arthritis of left knee [M17.12]   Pre-op diagnosis: Arthritis of left knee [M17.12]   Location: [facility] OR ROOM 01 / [facility]   Surgeons: Dot Galindo MD      Assessment    Pre-operative Medical Evaluation for planned surgery  Recommendations as listed in PLAN section below      Problem List Items Addressed This Visit                   Anemia    Relevant Orders    CBC and differential  Assume anemia CKD   Assume previous anemia 2.2 blood loss with surgery   Last HGB>12        Stage 3b chronic kidney disease (CKD) (HCC)  High risk JANEL post-op   He is seeing his nephrologist pre-op on 9.10.24  He can also see nephrology post-op if needed         Status post total knee replacement using cement, right - Primary  Seen for SO   Seen for geriatrics   Seen for SO anemia review - no further needs.   Received MC 8.27  Awaiting CC 8.15  Awaiting NC 9.10.24  started BEST   At risk for post-op JANEL - yes   At risk for post-op SSI- no   BEST   Breathing- instructed to exercise lungs prior to surgery via IS  Eat- discussed increasing protein intake prior to surgery   Sleep/stress- encouraged 8-10 sleep @ night, stress reduction, avoid sick contacts and handwashing   Train- encouraged to remain active       Other Visit Diagnoses       Encounter for geriatric assessment      No cognitive concerns   No geriatric concerns                    Plan:     2. Preoperative Medication Management Review performed by FREIDA  "nursing  NO    3. Patient requires further consultation with:   nephrology No Consults Required  Cardiology   MC     4. Discharge Planning / Barriers to Discharge  none identified - patients has post discharge therapy plan in place, transportation arranged for discharge day, adequate family support at home to assist with discharge to home.        PRE-OP WORKSHEET DATA  Assessment of Pre-Operative Risks     SOC Quality Hard Stops:    SSI Risk:  BMI (>40) : Estimated body mass index is 30.27 kg/m² as calculated from the following:    Height as of an earlier encounter on 8/27/24: 5' 11\" (1.803 m).    Weight as of an earlier encounter on 8/27/24: 98.4 kg (217 lb). (if >than 40 then offer weight management)  -Weight management consult No    HbA1c (<8.0) :   Lab Results   Component Value Date    HGBA1C 6.2 (H) 04/09/2023    (>8.0 recommend Endocrine consult)   -endocrine consult indicated No   await more recent hemoglobin A1c level      Tobacco use:  No     -lifestyle management consult No    SOC Anemia review:  Hgb ( >11):   Lab Results   Component Value Date    HGB 13.6 07/02/2024    HGB 10.1 (L) 04/09/2024    HGB 12.6 (L) 03/13/2024      -IV venofer indicated  No    JANEL risk:  GFR (>60) (Less then 45 = Nephrology consult):    Lab Results   Component Value Date    EGFR 28 (L) 07/02/2024    EGFR 32 (L) 05/21/2024    EGFR 35 04/09/2024      -Nephrology consult indicated Yes    GERIATRIC ASSESSMENT Yes  Mini-Cognitive Screen (MCI) score <2    inpatient geriatric consult No   -ordered delirium precautions on admit No  Depression Screen   Falls (yes within the last 6 months)   -Fall precautions ordered on admit No  Mini Nutritional Assessment (MNA) (score <12)   -Estimated body mass index is 30.27 kg/m² as calculated from the following:    Height as of an earlier encounter on 8/27/24: 5' 11\" (1.803 m).    Weight as of an earlier encounter on 8/27/24: 98.4 kg (217 lb).   -order inpatient nutrition evaluation No  METS (<4) " 7.71   -<4 MET send to SOC DOC for evaluation     Carb Drinks given by SOC   No    Subjective:           History of Present Illness:     Tian Garcia is a 74 y.o. male who presents to the office today for a preoperative screening. They are electing to undergo planned procedure with an understanding that all surgery has inherent risk. Today they present for preoperative risk assessment and recommendations for optimization in preparation for surgery.  We have reviewed their past medical/surgical/social history as listed below, their most recent labs and EKG, and labs were also reviewed and recommendations are as below.   Tian is a 74-year-old male who was seen in SOC for presurgery geriatric screening secondary to his age, having surgery, and receiving anesthetics.  He is a managing ongoing left knee pains electing to have a left knee replacement.  He has a history of a right knee replacement in April 2020 for which she did well with spinal anesthesia.  Recommend the same routine.  Tian agrees.    There was no cognitive concerns identified.  No geriatric concerns identified.  He lives at home with his wife.  Unfortunately right now his wife is hospitalized.  He is active.  He cuts the grass.  He admits to being in well health today.  Denies any new developments in his health.    Surgical optimization complete.  JANEL risk is high.  He has chronic kidney disease.  He is seeing his nephrologist presurgery and can see nephrology postsurgery.    SOC anemia review complete.  No further needs.  Current hemoglobin A1c is above 12 at 13.    SSI risk low.  HPI  ROS:      Denies fevers and denies chills  Denies congestion and denies sore throat  Denies chest pain  Denies palpitations  Denies shortness of breath  Denies abdominal pain  Denies nausea, vomiting, and diarrhea  Denies any issues with their skin... example NO open wounds or sores  Denies rashes  Denies difficulty urinating  Denies any issues with their urine...  example a dark color or an odor  Denies dizziness  Denies headaches  Denies confusion and denies hallucinations    Admits to being in well health today       Objective:    There were no vitals taken for this visit.      General Appearance: no distress, appropriate  HEENT: PERRLA, conjuctiva normal; oropharynx clear; mucous membranes moist;   Neck:  Supple  Lungs: lungs are clear bilaterally, no wheezes/rales/rhonchi noted, no accessory muscle use noted  CV: S1S2 regular, no murmurs, rubs or gallops; PMI normal   ABD: soft non tender, +BSx4  EXT: DP pulses intact, no lymphadenopathy, no edema  Skin: normal turgor, normal texture, no rash  Psych: affect normal, mood normal  Neuro: AAOx3        The following portions of the patient's history were reviewed and updated as appropriate: allergies, current medications, past family history, past medical history, past social history, past surgical history and problem list.     Past History:       Past Medical History:   Diagnosis Date    Acute venous embolism and thrombosis of deep vessels of proximal lower extremity (HCC)     Last assessed: 5/18/15    JANEL (acute kidney injury) ([facility])     Arthritis     Cellulitis     LE    Chronic kidney disease 03/2020    Acute Kidney Injury    CPAP (continuous positive airway pressure) dependence     Factor V Leiden ([facility])     Forgetfulness     Gross hematuria 05/17/2022    Headache(784.0) 03/2022    Never till cervical  spine surgery    Heart murmur 03/2020    Told when in hospital    History of transfusion     [place] (hard of hearing)     Hypoalbuminemia 05/11/2022    Other acute osteomyelitis, other site ([facility]) 01/03/2023    Paroxysmal atrial fibrillation ([facility])     Last assessed: 11/2/15    Sleep apnea     Past Surgical History:   Procedure Laterality Date    BACK SURGERY      Lower    CARDIAC CATHETERIZATION N/A 04/09/2023    Procedure: Cardiac pci;  Surgeon: Bird Cast MD;  Location: [facility] CARDIAC CATH LAB;  Service: Cardiology    CARDIAC  CATHETERIZATION N/A 04/09/2023    Procedure: Cardiac Coronary Angiogram;  Surgeon: Bird Cast MD;  Location: [facility] CARDIAC CATH LAB;  Service: Cardiology    CATARACT EXTRACTION      COLON SURGERY      COLONOSCOPY      INCISION AND DRAINAGE POSTERIOR SPINE N/A 04/01/2022    Procedure: Posterior cervical evacuation of postoperative collection and debridement with placement of drains C3-T1;  Surgeon: Rajeev Garcia MD;  Location: [facility] MAIN OR;  Service: Neurosurgery    IR BIOPSY KIDNEY RANDOM  05/26/2022    IR TUNNELED DIALYSIS CATHETER PLACEMENT  05/23/2022    IR TUNNELED DIALYSIS CATHETER REMOVAL  06/02/2022    RI ARTHRD ANT INTERBODY MIN DSC CRV BELOW C2 N/A 03/11/2022    Procedure: Anterior cervical discectomy and fixation fusion C5/6 and C6/7; Posterior cervical decompression and instrumented fusion C3-T1;  Surgeon: Rajeev Garcia MD;  Location: [facility] MAIN OR;  Service: Neurosurgery    RI ARTHRP KNE CONDYLE&PLATU MEDIAL&LAT COMPARTMENTS Right 4/8/2024    Procedure: ARTHROPLASTY KNEE TOTAL and all associated procedures;  Surgeon: Dot Galindo MD;  Location: [facility] Main OR;  Service: Orthopedics    RENAL BIOPSY  6/2022    SPINE SURGERY  03/11/2022    Cervical myelopathy/ cervical fusion          Social History     Tobacco Use    Smoking status: Never    Smokeless tobacco: Never   Vaping Use    Vaping status: Never Used   Substance Use Topics    Alcohol use: Not Currently    Drug use: No     Family History   Problem Relation Age of Onset    Lung cancer Mother     Hearing loss Father     Emphysema Sister     Heart attack Brother     Atrial fibrillation Brother     Clotting disorder Son           Allergies:     Allergies   Allergen Reactions    Morphine GI Intolerance    Vitamin K Other (See Comments)     On coumadin-patient sensitive to vitamin K amounts in meds etc.        Current Medications:     Current Outpatient Medications   Medication Instructions    ascorbic acid (VITAMIN C) 500 mg, Oral, 2  times daily    Aspirin 81 MG CAPS Oral    atorvastatin (LIPITOR) 80 mg, Oral, Every evening    DULoxetine (CYMBALTA) 60 mg delayed release capsule TAKE 1 CAPSULE BY MOUTH EVERY DAY    ezetimibe (ZETIA) 10 mg, Oral, Daily    ferrous sulfate 324 mg, Oral, Daily before breakfast    folic acid (FOLVITE) 1 mg, Oral, Daily    gabapentin (NEURONTIN) 300 mg capsule TAKE 1 CAPSULE BY MOUTH DAILY AT BEDTIME    glucose blood (Accu-Chek Guide) test strip Use one new strip daily dx r73.01    lisinopril (ZESTRIL) 5 mg, Oral, Daily    metoprolol succinate (TOPROL-XL) 25 mg, Oral, Daily    mometasone (ELOCON) 0.1 % cream APPLY TO AFFECTED AREA TOPICALLY EVERY DAY    nitroglycerin (NITROSTAT) 0.4 mg, Sublingual, Every 5 minutes PRN    potassium chloride (Klor-Con M20) 20 mEq tablet 20 mEq, Oral, Daily    tamsulosin (FLOMAX) 0.4 mg, Oral, Daily with dinner    tirzepatide (Mounjaro) 2.5 MG/0.5ML INJECT 0.5 ML (2.5 MG TOTAL) UNDER THE SKIN EVERY 7 DAYS    torsemide (DEMADEX) 20 mg tablet TAKE 1 AND 1/2 TABLETS ALTERNATING WITH 1 TABLET DAILY AS DIRECTED    traMADol (ULTRAM) 50 mg, Oral, Every 8 hours PRN    warfarin (COUMADIN) 5 mg tablet TAKE ONE-HALF TABLET BY MOUTH  DAILY , EXCEPT TAKE 1 TABLET BY  MOUTH ON WEDNESDAY AND SATURDAY    zolpidem (AMBIEN) 10 mg, Oral, Daily at bedtime PRN           Assessment of intra and post operative respiratory, hemodynamic and thrombotic risks     Prior Anesthesia Reactions: No   Pre-Op Data Reviewed:       Laboratory Results: I have personally reviewed the pertinent laboratory results/reports       Time of visit including pre-visit chart review, visit and post-visit coordination of plan and care , review of pre-surgical lab work, preparation and time spent documenting note in electronic medical record, time spent face-to-face in physical examination answering patient questions by care team 35 minutes             [facility]      [facility]  CONSULT        Patient has the ability to take their vital signs at home____  Allergies reviewed today   PMH reviewed today   Medications reviewed today     See Geriatric Assessment below...  Cognitive Assessment:   CAM:   TUG <15 sec:  Falls (last 6 months): no  Hand  score:36  -Attempt 1:36  -Attempt 2:36  -Attempt 3:36  Matt Total Score: 19  PHQ- 9 Depression Scale: 1  Nutrition Assessment Score:14  METS:7.71  Incentive Spirometry Level: 1000  Health goals:  -What are your overall health goals? Walk without assistants    -What brings you strength? wife    -What activities are important to you? Hunt and fish     As always we discussed having your BEST surgery, and BEST recovery.  Surgery goals reviewed today.      Breathing exercises   Patient was encouraged to begin lung exercises today.  This could be accomplished through deep breathing and cough exercises.  Patient was taught how to use an incentive spirometer.  Return demonstration provided.    Eating/nutrition   Encouraged patient to increase oral protein intake prior to surgery.  This can be accomplished by consuming chicken, fish, tuna fish, cottage cheese, cheese, eggs, Greek yogurt, and protein shakes as needed.  I encouraged use of protein shakes such ENLIVE.  I also recommended making your own protein shakes with protein powder.     Sleep/Stress management  Patient was encouraged to rest their body prior to surgery.  Encouraged attempting to get 8 hours of sleep at night.  Avoid stress.  Avoid sick contacts.  Encouraged to find a relaxing hobby such as reading, meditation, listening to music.  Training exercises  Patient was encouraged to remain active as possible.  Today bilateral lower extremity generic exercises were taught for muscle strengthening and balance.  All exercises to be done sitting down.

## 2024-08-29 ENCOUNTER — ANTICOAG VISIT (OUTPATIENT)
Dept: FAMILY MEDICINE CLINIC | Facility: HOSPITAL | Age: 74
End: 2024-08-29

## 2024-08-29 LAB
BASOPHILS # BLD AUTO: 0 X10E3/UL (ref 0–0.2)
BASOPHILS NFR BLD AUTO: 1 %
EOSINOPHIL # BLD AUTO: 0.1 X10E3/UL (ref 0–0.4)
EOSINOPHIL NFR BLD AUTO: 1 %
ERYTHROCYTE [DISTWIDTH] IN BLOOD BY AUTOMATED COUNT: 14.3 % (ref 11.6–15.4)
HCT VFR BLD AUTO: 40.2 % (ref 37.5–51)
HGB BLD-MCNC: 12.8 G/DL (ref 13–17.7)
IMM GRANULOCYTES # BLD: 0 X10E3/UL (ref 0–0.1)
IMM GRANULOCYTES NFR BLD: 0 %
LYMPHOCYTES # BLD AUTO: 1.5 X10E3/UL (ref 0.7–3.1)
LYMPHOCYTES NFR BLD AUTO: 25 %
MCH RBC QN AUTO: 29 PG (ref 26.6–33)
MCHC RBC AUTO-ENTMCNC: 31.8 G/DL (ref 31.5–35.7)
MCV RBC AUTO: 91 FL (ref 79–97)
MONOCYTES # BLD AUTO: 0.4 X10E3/UL (ref 0.1–0.9)
MONOCYTES NFR BLD AUTO: 7 %
NEUTROPHILS # BLD AUTO: 3.8 X10E3/UL (ref 1.4–7)
NEUTROPHILS NFR BLD AUTO: 66 %
PLATELET # BLD AUTO: 197 X10E3/UL (ref 150–450)
RBC # BLD AUTO: 4.41 X10E6/UL (ref 4.14–5.8)
WBC # BLD AUTO: 5.9 X10E3/UL (ref 3.4–10.8)

## 2024-09-05 ENCOUNTER — APPOINTMENT (OUTPATIENT)
Dept: LAB | Facility: AMBULARY SURGERY CENTER | Age: 74
End: 2024-09-05
Payer: COMMERCIAL

## 2024-09-05 ENCOUNTER — LAB REQUISITION (OUTPATIENT)
Dept: LAB | Facility: HOSPITAL | Age: 74
End: 2024-09-05
Payer: COMMERCIAL

## 2024-09-05 DIAGNOSIS — M17.12 ARTHRITIS OF LEFT KNEE: Primary | ICD-10-CM

## 2024-09-05 DIAGNOSIS — M17.12 UNILATERAL PRIMARY OSTEOARTHRITIS, LEFT KNEE: ICD-10-CM

## 2024-09-05 DIAGNOSIS — Z01.818 OTHER SPECIFIED PRE-OPERATIVE EXAMINATION: ICD-10-CM

## 2024-09-05 DIAGNOSIS — Z01.818 ENCOUNTER FOR OTHER PREPROCEDURAL EXAMINATION: ICD-10-CM

## 2024-09-05 LAB
ABO GROUP BLD: NORMAL
BLD GP AB SCN SERPL QL: NEGATIVE
BUN SERPL-MCNC: 30 MG/DL (ref 8–27)
BUN/CREAT SERPL: 15 (ref 10–24)
CALCIUM SERPL-MCNC: 9.5 MG/DL (ref 8.6–10.2)
CHLORIDE SERPL-SCNC: 97 MMOL/L (ref 96–106)
CO2 SERPL-SCNC: 28 MMOL/L (ref 20–29)
CREAT SERPL-MCNC: 2.02 MG/DL (ref 0.76–1.27)
EGFR: 34 ML/MIN/1.73
GLUCOSE SERPL-MCNC: 92 MG/DL (ref 70–99)
POTASSIUM SERPL-SCNC: 4 MMOL/L (ref 3.5–5.2)
RH BLD: POSITIVE
SODIUM SERPL-SCNC: 139 MMOL/L (ref 134–144)
SPECIMEN EXPIRATION DATE: NORMAL

## 2024-09-05 PROCEDURE — 86901 BLOOD TYPING SEROLOGIC RH(D): CPT | Performed by: ORTHOPAEDIC SURGERY

## 2024-09-05 PROCEDURE — 86900 BLOOD TYPING SEROLOGIC ABO: CPT | Performed by: ORTHOPAEDIC SURGERY

## 2024-09-05 PROCEDURE — 36415 COLL VENOUS BLD VENIPUNCTURE: CPT

## 2024-09-05 PROCEDURE — 86850 RBC ANTIBODY SCREEN: CPT | Performed by: ORTHOPAEDIC SURGERY

## 2024-09-05 NOTE — PRE-PROCEDURE INSTRUCTIONS
Pre-Surgery Instructions:   Medication Instructions    ascorbic acid (VITAMIN C) 500 MG tablet Hold day of surgery.    Aspirin 81 MG CAPS Take day of surgery.    atorvastatin (LIPITOR) 80 mg tablet Take day of surgery.    DULoxetine (CYMBALTA) 60 mg delayed release capsule Take day of surgery.    ezetimibe (ZETIA) 10 mg tablet Take day of surgery.    folic acid (FOLVITE) 1 mg tablet Hold day of surgery.    gabapentin (NEURONTIN) 300 mg capsule Take night before surgery    lisinopril (ZESTRIL) 5 mg tablet Hold day of surgery.    metoprolol succinate (TOPROL-XL) 25 mg 24 hr tablet Take day of surgery.    nitroglycerin (NITROSTAT) 0.4 mg SL tablet Uses PRN- OK to take day of surgery    potassium chloride (Klor-Con M20) 20 mEq tablet Hold day of surgery.    tamsulosin (FLOMAX) 0.4 mg Take night before surgery    tirzepatide (Mounjaro) 2.5 MG/0.5ML Patient has not been taking in the past month    torsemide (DEMADEX) 20 mg tablet Hold day of surgery.    traMADol (Ultram) 50 mg tablet Uses PRN- OK to take day of surgery    warfarin (COUMADIN) 5 mg tablet Stop taking 5 days prior to surgery. Advised by PCP      Medication instructions for day surgery reviewed. Please use only a sip of water to take your instructed medications. Avoid all over the counter vitamins, supplements and NSAIDS for one week prior to surgery per anesthesia guidelines. Tylenol is ok to take as needed.     You will receive a call one business day prior to surgery with an arrival time and hospital directions. If your surgery is scheduled on a Monday, the hospital will be calling you on the Friday prior to your surgery. If you have not heard from anyone by 8pm, please call the hospital supervisor through the hospital  at [phone]. ([place] [phone] or [place] [phone]).    Do not eat or drink anything after midnight the night before your surgery, including candy, mints, lifesavers, or chewing gum. Do not drink alcohol 24hrs before  your surgery. Try not to smoke at least 24hrs before your surgery.       Follow the pre surgery showering instructions as listed in the “My Surgical Experience Booklet” or otherwise provided by your surgeon's office. Do not use a blade to shave the surgical area 1 week before surgery. It is okay to use a clean electric clippers up to 24 hours before surgery. Do not apply any lotions, creams, including makeup, cologne, deodorant, or perfumes after showering on the day of your surgery. Do not use dry shampoo, hair spray, hair gel, or any type of hair products.     No contact lenses, eye make-up, or artificial eyelashes. Remove nail polish, including gel polish, and any artificial, gel, or acrylic nails if possible. Remove all jewelry including rings and body piercing jewelry.     Wear causal clothing that is easy to take on and off. Consider your type of surgery.    Keep any valuables, jewelry, piercings at home. Please bring any specially ordered equipment (sling, braces) if indicated.    Arrange for a responsible person to drive you to and from the hospital on the day of your surgery. Please confirm the visitor policy for the day of your procedure when you receive your phone call with an arrival time.     Call the surgeon's office with any new illnesses, exposures, or additional questions prior to surgery.    Please reference your “My Surgical Experience Booklet” for additional information to prepare for your upcoming surgery.

## 2024-09-09 ENCOUNTER — ANTICOAG VISIT (OUTPATIENT)
Dept: FAMILY MEDICINE CLINIC | Facility: HOSPITAL | Age: 74
End: 2024-09-09

## 2024-09-10 ENCOUNTER — OFFICE VISIT (OUTPATIENT)
Dept: NEPHROLOGY | Facility: HOSPITAL | Age: 74
End: 2024-09-10
Payer: COMMERCIAL

## 2024-09-10 ENCOUNTER — TELEPHONE (OUTPATIENT)
Dept: OBGYN CLINIC | Facility: CLINIC | Age: 74
End: 2024-09-10

## 2024-09-10 VITALS
WEIGHT: 219 LBS | DIASTOLIC BLOOD PRESSURE: 62 MMHG | HEIGHT: 71 IN | SYSTOLIC BLOOD PRESSURE: 118 MMHG | BODY MASS INDEX: 30.66 KG/M2

## 2024-09-10 DIAGNOSIS — N02.B9 IGA NEPHROPATHY DETERMINED BY BIOPSY OF KIDNEY: Primary | ICD-10-CM

## 2024-09-10 DIAGNOSIS — R80.8 OTHER PROTEINURIA: ICD-10-CM

## 2024-09-10 DIAGNOSIS — N18.32 ANEMIA DUE TO STAGE 3B CHRONIC KIDNEY DISEASE  (HCC): ICD-10-CM

## 2024-09-10 DIAGNOSIS — I10 PRIMARY HYPERTENSION: ICD-10-CM

## 2024-09-10 DIAGNOSIS — D63.1 ANEMIA DUE TO STAGE 3B CHRONIC KIDNEY DISEASE  (HCC): ICD-10-CM

## 2024-09-10 DIAGNOSIS — N18.32 STAGE 3B CHRONIC KIDNEY DISEASE (CKD) (HCC): ICD-10-CM

## 2024-09-10 PROBLEM — R31.29 MICROSCOPIC HEMATURIA: Status: RESOLVED | Noted: 2022-05-02 | Resolved: 2024-09-10

## 2024-09-10 PROCEDURE — 99214 OFFICE O/P EST MOD 30 MIN: CPT | Performed by: INTERNAL MEDICINE

## 2024-09-10 NOTE — PATIENT INSTRUCTIONS
1. JANEL d/t IgAN, biopsy proven, in setting of recent MRSA infection. Suspect CKD stage 3b  -renal biopsy performed May 26, 2022 was limited as only 6 intact glomeruli were present for evaluation on light microscopy.  IgA dominant immune complex deposition noted by immunofluorescence.  Differential includes IgA nephropathy both primary and secondary forms versus infectious associated glomerulonephritis.  Staph aureus infections have been associated with IgA dominant immune complex deposition.  Patient did have MRSA surgical site infection so infection associated GN should be considered.  -prednisone 60 mg daily begun June 2nd, 2022. S/p prednisone taper, completed 9/1/22  -monitor CMP, CBC   -if this was a primary IgA nephropathy, it would be compatible with Crittenden classification of M0 E1 S0 T1-C1.  -of 38 glomeruli, 6 were intact, forehead global glomerulosclerosis, segmental sclerosis was absent.  Moderate interstitial fibrosis and tubular atrophy as well as arterial intimal fibrosis and mild arterolar hyalinosis were noted  -did not require dialysis inpatient, permacath removed prior to discharge  -sCr 2.02 as of 9/4/24, slowly improving from 5.34 and seems to have stabilized.   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 Thankfully, sCr stable and proteinuria continues to improve  -recommend CKD education - referral placed at prior visit. Call her when you are ready to do this.     2. Hypophosphatemia - phos 3.4, in setting of JANEL. Resolved.     3. Proteinuria - UpCr in setting of IgAN shows UpCr improved from 18.8g to 1.5g as of 8/15/22, with latest  microalb:Cr on wlow dose ACEi      4. HTN - BP acceptable for age/CKD in office. -Continue metoprolol 25mg twice daily, lisinopril 5mg daily, metoprolol 25mg daily, flomax, torsemide 10mg daily     5. Anemia ? D/t underlying GN/CKD - Hgb 10.1 as of April 2024, monitor CBC, off iron iron     6. LE edema likely d/t nephrotic syndrome as well as history of chronic diastolic CHF (grade 2 diastolic dysfunction noted on April 2022 ech)  - continue torsemide as above, on K supplement, will monitor K/SCr. Monitor daily weight/BP readings.     Repeat CMP, UA, UpCr and microalb:Cr and CBC in Dec. 2024.  RTC in 4 months.  To have left knee replacement 9/23/24. Should hold lisinopril 5mg, potassium supplement and the torsemide 10mg on the morning of surgery.    I have personally discussed the risks and benefits of the surgery from a renal standpoint with the patient in depth, and advised the patient about the risk of JANEL and the course of JANEL if it occurs with the small probability of the need for renal replacement therapy in the worse case scenario. Patient voiced understanding.    From a renal standpoint the patient is renally optimized for surgery with the following recommendations:  Fluids to administer: Normal Saline 250 cc total over 2 hours   Medication Recommendations:  Minimize any IV contrast use (If IV contrast is used, please check BMP POD # 1)  Hold NSAIDs for at least 10 days prior to surgery  Hold ACEi/ARB starting on the day of the surgery  Hold torsemide starting on the day of surgery/procedure  Hemodynamic Recommendations:  Ideally, target MAPs greater than 65 mmHg in the perioperative period, and minimize operative time with MAPs < 65 mmHg.   Avoid intraop hemodynamic instability to decrease risk for JANEL occurrence.  Other Recommendations:  Please consult the Nephrology team when the patient is admitted

## 2024-09-10 NOTE — TELEPHONE ENCOUNTER
Returned pt's call per message rec'd.  Pt asking if we rec'd his lab results from [facility].  Yes, his results have been rec'd.

## 2024-09-10 NOTE — PROGRESS NOTES
NEPHROLOGY OUTPATIENT PROGRESS NOTE   Tian Garcia 74 y.o. male MRN: [id number]  DATE: 9/10/2024  Reason for visit:   Chief Complaint   Patient presents with    Follow-up    Chronic Kidney Disease        Patient Instructions   1. JANEL d/t IgAN, biopsy proven, in setting of recent MRSA infection. Suspect CKD stage 3b  -renal biopsy performed May 26, 2022 was limited as only 6 intact glomeruli were present for evaluation on light microscopy.  IgA dominant immune complex deposition noted by immunofluorescence.  Differential includes IgA nephropathy both primary and secondary forms versus infectious associated glomerulonephritis.  Staph aureus infections have been associated with IgA dominant immune complex deposition.  Patient did have MRSA surgical site infection so infection associated GN should be considered.  -prednisone 60 mg daily begun June 2nd, 2022. S/p prednisone taper, completed 9/1/22  -monitor CMP, CBC   -if this was a primary IgA nephropathy, it would be compatible with [place] classification of M0 E1 S0 T1-C1.  -of 38 glomeruli, 6 were intact, forehead global glomerulosclerosis, segmental sclerosis was absent.  Moderate interstitial fibrosis and tubular atrophy as well as arterial intimal fibrosis and mild arterolar hyalinosis were noted  -did not require dialysis inpatient, permacath removed prior to discharge  -sCr 2.02 as of 9/4/24, slowly improving from 5.34 and seems to have stabilized.   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 Thankfully, sCr stable and proteinuria continues to improve  -recommend CKD education - referral placed at  prior visit. Call her when you are ready to do this.     2. Hypophosphatemia - phos 3.4, in setting of JANEL. Resolved.     3. Proteinuria - UpCr in setting of IgAN shows UpCr improved from 18.8g to 1.5g as of 8/15/22, with latest microalb:Cr on wlow dose ACEi      4. HTN - BP acceptable for age/CKD in office. -Continue metoprolol 25mg twice daily, lisinopril 5mg daily, metoprolol 25mg daily, flomax, torsemide 10mg daily     5. Anemia ? D/t underlying GN/CKD - Hgb 10.1 as of April 2024, monitor CBC, off iron iron     6. LE edema likely d/t nephrotic syndrome as well as history of chronic diastolic CHF (grade 2 diastolic dysfunction noted on April 2022 ech)  - continue torsemide as above, on K supplement, will monitor K/SCr. Monitor daily weight/BP readings.     Repeat CMP, UA, UpCr and microalb:Cr and CBC in Dec. 2024.  RTC in 4 months.  To have left knee replacement 9/23/24. Should hold lisinopril 5mg, potassium supplement and the torsemide 10mg on the morning of surgery.    I have personally discussed the risks and benefits of the surgery from a renal standpoint with the patient in depth, and advised the patient about the risk of JANEL and the course of JANEL if it occurs with the small probability of the need for renal replacement therapy in the worse case scenario. Patient voiced understanding.    From a renal standpoint the patient is renally optimized for surgery with the following recommendations:  Fluids to administer: Normal Saline 250 cc total over 2 hours   Medication Recommendations:  Minimize any IV contrast use (If IV contrast is used, please check BMP POD # 1)  Hold NSAIDs for at least 10 days prior to surgery  Hold ACEi/ARB starting on the day of the surgery  Hold torsemide starting on the day of surgery/procedure  Hemodynamic Recommendations:  Ideally, target MAPs greater than 65 mmHg in the perioperative period, and minimize operative time with MAPs < 65 mmHg.   Avoid intraop hemodynamic instability to  decrease risk for JANEL occurrence.  Other Recommendations:  Please consult the Nephrology team when the patient is admitted       Tian was seen today for follow-up and chronic kidney disease.    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  -     Urinalysis with microscopic; Future  -     Protein / creatinine ratio, urine; Future  -     Albumin / creatinine urine ratio; Future  -     Comprehensive metabolic panel; Future  -     Urinalysis with microscopic  -     Protein / creatinine ratio, urine  -     Albumin / creatinine urine ratio  -     Comprehensive metabolic panel    Primary hypertension    Stage 3b chronic kidney disease (CKD) (HCC)    Anemia due to stage 3b chronic kidney disease  (HCC)  -     CBC and differential; Future  -     CBC and differential    Other proteinuria  -     Protein / creatinine ratio, urine; Future  -     Albumin / creatinine urine ratio; Future  -     Protein / creatinine ratio, urine  -     Albumin / creatinine urine ratio        Assessment/Plan:  1. JANEL d/t IgAN, biopsy proven, in setting of recent MRSA infection. Suspect CKD stage 3b  -renal biopsy performed May 26, 2022 was limited as only 6 intact glomeruli were present for evaluation on light microscopy.  IgA dominant immune complex deposition noted by immunofluorescence.  Differential includes IgA nephropathy both primary and secondary forms versus infectious associated glomerulonephritis.  Staph aureus infections have been associated with IgA dominant immune complex deposition.  Patient did have MRSA surgical site infection so infection associated GN should be considered.  -prednisone 60 mg daily begun June 2nd, 2022. S/p prednisone taper, completed 9/1/22  -monitor CMP, CBC   -if this was a primary IgA nephropathy, it would be compatible with [place] classification of M0 E1 S0 T1-C1.  -of 38 glomeruli, 6 were intact, forehead global glomerulosclerosis, segmental sclerosis was absent.   Moderate interstitial fibrosis and tubular atrophy as well as arterial intimal fibrosis and mild arterolar hyalinosis were noted  -did not require dialysis inpatient, permacath removed prior to discharge  -sCr 2.02 as of 9/4/24, slowly improving from 5.34 and seems to have stabilized.   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 Thankfully, sCr stable and proteinuria continues to improve  -recommend CKD education - referral placed at prior visit. Call her when you are ready to do this.     2. Hypophosphatemia - phos 3.4, in setting of JANEL. Resolved.     3. Proteinuria - UpCr in setting of IgAN shows UpCr improved from 18.8g to 1.5g as of 8/15/22, with latest microalb:Cr on wlow dose ACEi      4. HTN - BP acceptable for age/CKD in office. -Continue metoprolol 25mg twice daily, lisinopril 5mg daily, metoprolol 25mg daily, flomax, torsemide 10mg daily     5. Anemia ? D/t underlying GN/CKD - Hgb 10.1 as of April 2024, monitor CBC, off iron iron     6. LE edema likely d/t nephrotic syndrome as well as history of chronic diastolic CHF (grade 2 diastolic dysfunction noted on April 2022 ech)  - continue torsemide as above, on K supplement, will monitor K/SCr. Monitor daily weight/BP readings.     Repeat CMP, UA, UpCr and microalb:Cr and CBC in Dec. 2024.  RTC in 4 months.  To have left knee replacement 9/23/24. Should hold lisinopril 5mg, potassium supplement and the torsemide 10mg on the morning of surgery.    I have personally discussed the risks and benefits of the surgery from a renal standpoint with the patient in depth, and advised the patient about the  risk of JANEL and the course of JANEL if it occurs with the small probability of the need for renal replacement therapy in the worse case scenario. Patient voiced understanding.    From a renal standpoint the patient is renally optimized for surgery with the following recommendations:  Fluids to administer: Normal Saline 250 cc total over 2 hours   Medication Recommendations:  Minimize any IV contrast use (If IV contrast is used, please check BMP POD # 1)  Hold NSAIDs for at least 10 days prior to surgery  Hold ACEi/ARB starting on the day of the surgery  Hold torsemide starting on the day of surgery/procedure  Hemodynamic Recommendations:  Ideally, target MAPs greater than 65 mmHg in the perioperative period, and minimize operative time with MAPs < 65 mmHg.   Avoid intraop hemodynamic instability to decrease risk for JANEL occurrence.  Other Recommendations:  Please consult the Nephrology team when the patient is admitted     SUBJECTIVE / INTERVAL HISTORY:  74 y.o. male presents in follow up of IgAN/CKD.     Tian Garcia denies any recent illness/hospitalizations/medication changes since last office visit.    Denies NSAID use. Uses tylenol as needed for pain.  Weight stable. Has occasional leg edema.  Drinks a lot of water.  HTN - BP at home well controlled    Review of Systems   Constitutional:  Negative for chills and fever.   HENT:  Negative for sore throat.    Eyes:  Negative for visual disturbance.   Respiratory:  Negative for cough and shortness of breath.    Cardiovascular:  Positive for leg swelling. Negative for chest pain.   Gastrointestinal:  Negative for abdominal pain, constipation, diarrhea, nausea and vomiting.   Endocrine: Negative for polyuria.   Genitourinary:  Negative for decreased urine volume, difficulty urinating, dysuria and hematuria.   Musculoskeletal:  Positive for back pain (stiffness in lower back). Negative for neck pain.        Right shoulder pain   Skin:  Negative for rash.  "  Neurological:  Positive for dizziness (sometimes if he stands too quickly in the sun). Negative for light-headedness and numbness.   Psychiatric/Behavioral:  Negative for confusion.        OBJECTIVE:  /62 (BP Location: Left arm, Patient Position: Sitting, Cuff Size: Adult)   Ht 5' 11\" (1.803 m)   Wt 99.3 kg (219 lb)   BMI 30.54 kg/m²  Body mass index is 30.54 kg/m².    Physical exam:  Physical Exam  Vitals reviewed.   Constitutional:       General: He is not in acute distress.     Appearance: Normal appearance. He is well-developed. He is not diaphoretic.   HENT:      Head: Normocephalic and atraumatic.      Nose: Nose normal.      Mouth/Throat:      Mouth: Mucous membranes are moist.      Pharynx: No oropharyngeal exudate.   Eyes:      General: No scleral icterus.        Right eye: No discharge.         Left eye: No discharge.   Neck:      Thyroid: No thyromegaly.   Cardiovascular:      Rate and Rhythm: Normal rate and regular rhythm.      Heart sounds: Normal heart sounds.   Pulmonary:      Effort: Pulmonary effort is normal.      Breath sounds: Normal breath sounds. No wheezing or rales.   Abdominal:      General: Bowel sounds are normal. There is no distension.      Palpations: Abdomen is soft.      Tenderness: There is no abdominal tenderness.   Musculoskeletal:         General: Swelling (b/l ankles) present. Normal range of motion.      Cervical back: Neck supple.   Lymphadenopathy:      Cervical: No cervical adenopathy.   Skin:     General: Skin is warm and dry.      Coloration: Skin is not jaundiced.      Findings: No rash.   Neurological:      General: No focal deficit present.      Mental Status: He is alert.      Comments: awake   Psychiatric:         Mood and Affect: Mood normal.         Behavior: Behavior normal.         Medications:    Current Outpatient Medications:     ascorbic acid (VITAMIN C) 500 MG tablet, Take 1 tablet (500 mg total) by mouth 2 (two) times a day, Disp: 60 tablet, Rfl: " 1    Aspirin 81 MG CAPS, Take by mouth, Disp: , Rfl:     atorvastatin (LIPITOR) 80 mg tablet, TAKE 1 TABLET BY MOUTH EVERY EVENING, Disp: 90 tablet, Rfl: 1    DULoxetine (CYMBALTA) 60 mg delayed release capsule, TAKE 1 CAPSULE BY MOUTH EVERY DAY, Disp: 90 capsule, Rfl: 1    ezetimibe (ZETIA) 10 mg tablet, TAKE 1 TABLET BY MOUTH EVERY DAY, Disp: 90 tablet, Rfl: 3    folic acid (FOLVITE) 1 mg tablet, Take 1 tablet (1 mg total) by mouth daily, Disp: 30 tablet, Rfl: 1    gabapentin (NEURONTIN) 300 mg capsule, TAKE 1 CAPSULE BY MOUTH DAILY AT BEDTIME, Disp: 90 capsule, Rfl: 1    glucose blood (Accu-Chek Guide) test strip, Use one new strip daily dx r73.01, Disp: 100 strip, Rfl: 1    lisinopril (ZESTRIL) 5 mg tablet, Take 1 tablet (5 mg total) by mouth daily, Disp: 90 tablet, Rfl: 3    metoprolol succinate (TOPROL-XL) 25 mg 24 hr tablet, Take 1 tablet (25 mg total) by mouth daily, Disp: 90 tablet, Rfl: 3    nitroglycerin (NITROSTAT) 0.4 mg SL tablet, Place 1 tablet (0.4 mg total) under the tongue every 5 (five) minutes as needed for chest pain, Disp: 30 tablet, Rfl: 0    potassium chloride (Klor-Con M20) 20 mEq tablet, Take 1 tablet (20 mEq total) by mouth daily, Disp: 90 tablet, Rfl: 3    tamsulosin (FLOMAX) 0.4 mg, Take 1 capsule (0.4 mg total) by mouth daily with dinner, Disp: 90 capsule, Rfl: 1    tirzepatide (Mounjaro) 2.5 MG/0.5ML, INJECT 0.5 ML (2.5 MG TOTAL) UNDER THE SKIN EVERY 7 DAYS, Disp: 2 mL, Rfl: 1    torsemide (DEMADEX) 20 mg tablet, TAKE 1 AND 1/2 TABLETS ALTERNATING WITH 1 TABLET DAILY AS DIRECTED (Patient taking differently: Take 10 mg by mouth PT taking 1/2 tablet daily (10mg) usually), Disp: 135 tablet, Rfl: 1    traMADol (Ultram) 50 mg tablet, Take 1 tablet (50 mg total) by mouth every 8 (eight) hours as needed for moderate pain, Disp: 90 tablet, Rfl: 0    warfarin (COUMADIN) 5 mg tablet, TAKE ONE-HALF TABLET BY MOUTH  DAILY , EXCEPT TAKE 1 TABLET BY  MOUTH ON WEDNESDAY AND SATURDAY (Patient taking  "differently: TAKE ONE-HALF TABLET BY MOUTH  DAILY , EXCEPT TAKE 1 TABLET Tuesday Managed by Internal Medicine.), Disp: 58 tablet, Rfl: 3    zolpidem (AMBIEN) 10 mg tablet, Take 1 tablet (10 mg total) by mouth daily at bedtime as needed for sleep, Disp: 90 tablet, Rfl: 0    ferrous sulfate 324 (65 Fe) mg, Take 1 tablet (324 mg total) by mouth daily before breakfast (Patient not taking: Reported on 8/27/2024), Disp: 60 tablet, Rfl: 1    mometasone (ELOCON) 0.1 % cream, APPLY TO AFFECTED AREA TOPICALLY EVERY DAY (Patient not taking: Reported on 9/10/2024), Disp: 45 g, Rfl: 1    Allergies:  Allergies as of 09/10/2024 - Reviewed 09/10/2024   Allergen Reaction Noted    Morphine GI Intolerance 09/11/2018    Vitamin k Other (See Comments) 12/28/2020       The following portions of the patient's history were reviewed and updated as appropriate: past family history, past surgical history and problem list.    Laboratory Results:  Lab Results   Component Value Date    SODIUM 139 09/04/2024    K 4.0 09/04/2024    CL 97 09/04/2024    CO2 28 09/04/2024    BUN 30 (H) 09/04/2024    CREATININE 2.02 (H) 09/04/2024    GLUC 92 09/04/2024    CALCIUM 9.5 08/12/2024    CALCIUM 9.5 08/12/2024        Lab Results   Component Value Date    CALCIUM 9.5 08/12/2024    CALCIUM 9.5 08/12/2024    PHOS 3.6 05/22/2023       Portions of the record may have been created with voice recognition software.  Occasional wrong word or \"sound a like\" substitutions may have occurred due to the inherent limitations of voice recognition software.  Read the chart carefully and recognize, using context, where substitutions have occurred.  "

## 2024-09-11 ENCOUNTER — TELEPHONE (OUTPATIENT)
Age: 74
End: 2024-09-11

## 2024-09-11 NOTE — TELEPHONE ENCOUNTER
Marj from [facility] called to review patient's medications. Patient has not filled Atorvastatin since 5/30/24 (90 day supply) or Gabapentin since 3/29/24 (90 day supply). She wanted PCP to be mad aware. Thank you

## 2024-09-12 NOTE — TELEPHONE ENCOUNTER
Pt states he has not missed any doses. He gets meds from [facility] x 90 days and also has refills available on both.

## 2024-09-16 ENCOUNTER — TELEPHONE (OUTPATIENT)
Dept: OBGYN CLINIC | Facility: CLINIC | Age: 74
End: 2024-09-16

## 2024-09-16 NOTE — TELEPHONE ENCOUNTER
Pt called to ask if he's scheduled to stay over-night at [place] after his Lt TKA on Monday.  Per the last OVN, pt is to spend one night at [place].  Pt also stated that he needs a refill of his Vitamin C and Folic Acid.  Message sent to provider with that request.

## 2024-09-17 DIAGNOSIS — M17.12 ARTHRITIS OF LEFT KNEE: ICD-10-CM

## 2024-09-17 DIAGNOSIS — Z01.818 PREOP TESTING: ICD-10-CM

## 2024-09-17 RX ORDER — ASCORBIC ACID 500 MG
500 TABLET ORAL 2 TIMES DAILY
Qty: 60 TABLET | Refills: 1 | Status: SHIPPED | OUTPATIENT
Start: 2024-09-17

## 2024-09-17 RX ORDER — FOLIC ACID 1 MG/1
1 TABLET ORAL DAILY
Qty: 30 TABLET | Refills: 1 | Status: SHIPPED | OUTPATIENT
Start: 2024-09-17

## 2024-09-21 DIAGNOSIS — M17.12 ARTHRITIS OF LEFT KNEE: ICD-10-CM

## 2024-09-21 DIAGNOSIS — Z01.818 PREOP TESTING: ICD-10-CM

## 2024-09-21 NOTE — ANESTHESIA PREPROCEDURE EVALUATION
Procedure:  ARTHROPLASTY KNEE TOTAL and all associated procedures (Left: Knee)    #s/p C5-7 ACDF  #Metoprolol 25mg daily 24 hours  #Coumadin,   Lab Results   Component Value Date    INR 2.9 (H) 09/07/2024    INR 2.8 (H) 08/28/2024    INR 2.5 (H) 08/12/2024    PROTIME 28.9 (H) 09/07/2024    PROTIME 27.9 (H) 08/28/2024    PROTIME 25.7 (H) 08/12/2024     Mounjaro q7 days    Relevant Problems   CARDIO   (+) Atherosclerosis of aorta (HCC)   (+) Atherosclerosis of native coronary artery of native heart without angina pectoris   (+) Mixed hyperlipidemia   (+) Moderate aortic stenosis   (+) Paroxysmal A-fib (HCC)   (+) Primary hypertension   (+) Sinus bradycardia      /RENAL   (+) Glomerulonephritis   (+) 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   (+) Stage 3b chronic kidney disease (CKD) ([facility])      HEMATOLOGY   (+) Anemia   (+) Anemia due to stage 4 chronic kidney disease  (HCC)   (+) 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HCC)      MUSCULOSKELETAL   (+) Other spondylosis with myelopathy, cervical region   (+) Primary osteoarthritis of left knee   (+) Primary osteoarthritis of right knee   (+) Primary osteoarthritis of right shoulder      NEURO/PSYCH   (+) Head pain cephalgia   (+) Reactive depression      PULMONARY   (+) WILL (obstructive sleep apnea)      TTE (08/2024):      Left Ventricle: Left ventricular cavity size is moderately dilated. Wall thickness is normal. The left ventricular ejection fraction is 37% by biplane measurement. Systolic function is normal. There is moderate global hypokinesis.    IVS: Septal motion is not well visualized.    Right Ventricle: Right ventricular cavity size is mildly dilated. Systolic function is normal.    Left Atrium: The atrium is moderately dilated (42-48 mL/m2).    Right Atrium: The atrium is mildly dilated.    Aortic Valve: The aortic valve is trileaflet. The leaflets are moderately thickened. The leaflets are moderately calcified. There is moderately reduced  mobility. There is mild regurgitation. There is moderate stenosis. The aortic valve peak velocity is 2.36 m/s. The aortic valve mean gradient is 13 mmHg. The dimensionless velocity index is 0.32. The aortic valve area is 1.32 cm2. The stroke volume index is 24.20 ml/m2. The aortic valve velocity is increased due to stenosis but lower than expected due to the presence of decreased flow.    Mitral Valve: There is mild annular calcification. There is mild regurgitation.    Prior TTE study available for comparison. Prior study date: 6/9/2023. Changes noted when compared to prior study. Changes include: LVEF is now moderately reduced. .  Lab Results   Component Value Date    WBC 5.9 08/28/2024    HGB 12.8 (L) 08/28/2024    HCT 40.2 08/28/2024    MCV 91 08/28/2024     08/28/2024       Physical Exam    Airway    Mallampati score: III  TM Distance: >3 FB  Neck ROM: full     Dental    upper dentures,     Cardiovascular  Rhythm: regular, Rate: normal    Pulmonary   Breath sounds clear to auscultation    Other Findings  Intercisor Distance > 3cm          Anesthesia Plan  ASA Score- 3     Anesthesia Type- spinal with ASA Monitors.         Additional Monitors:     Airway Plan:     Comment: Discussed benefits/risks of neuraxial anesthesia including possibility of discomfort at site of injection, post-dural puncture headache, block failure, and more rare complications such as bleeding, infection, and permanent nerve damage. If block fails or there is difficulty placing neuraxial block, general anesthesia was discussed as back-up plan. Patient understands and wishes to proceed.     Discussed nerve block to assist with post-operative analgesia. Discussed possibility of uncommon complications including permanent nerve injury, damage to blood vessels, infection local anesthetic toxicity, and nerve block failure. Patient understands and wishes to proceed.     All questions answered.   .       Plan Factors-Exercise tolerance  (METS): >4 METS.    Chart reviewed. EKG reviewed.  Existing labs reviewed.                   Induction-     Postoperative Plan- Plan for postoperative opioid use.         Informed Consent- Anesthetic plan and risks discussed with patient.  I personally reviewed this patient with the CRNA. Discussed and agreed on the Anesthesia Plan with the CRNA..

## 2024-09-23 ENCOUNTER — HOSPITAL ENCOUNTER (OUTPATIENT)
Facility: HOSPITAL | Age: 74
Setting detail: OUTPATIENT SURGERY
Discharge: HOME | End: 2024-09-24
Attending: ORTHOPAEDIC SURGERY | Admitting: ORTHOPAEDIC SURGERY
Payer: COMMERCIAL

## 2024-09-23 ENCOUNTER — ANESTHESIA (OUTPATIENT)
Dept: PERIOP | Facility: HOSPITAL | Age: 74
End: 2024-09-23
Payer: COMMERCIAL

## 2024-09-23 DIAGNOSIS — G89.29 CHRONIC PAIN OF BOTH KNEES: Primary | ICD-10-CM

## 2024-09-23 DIAGNOSIS — M25.562 CHRONIC PAIN OF BOTH KNEES: Primary | ICD-10-CM

## 2024-09-23 DIAGNOSIS — I25.5 ISCHEMIC CARDIOMYOPATHY: ICD-10-CM

## 2024-09-23 DIAGNOSIS — I25.10 CORONARY ARTERY DISEASE INVOLVING NATIVE CORONARY ARTERY OF NATIVE HEART WITHOUT ANGINA PECTORIS: Chronic | ICD-10-CM

## 2024-09-23 DIAGNOSIS — Z96.652 S/P TOTAL KNEE ARTHROPLASTY, LEFT: ICD-10-CM

## 2024-09-23 DIAGNOSIS — M25.561 CHRONIC PAIN OF BOTH KNEES: Primary | ICD-10-CM

## 2024-09-23 DIAGNOSIS — I48.0 PAROXYSMAL A-FIB (HCC): ICD-10-CM

## 2024-09-23 LAB
CARDIAC TROPONIN I PNL SERPL HS: 4 NG/L (ref 8–18)
GLUCOSE SERPL-MCNC: 101 MG/DL (ref 65–140)
INR PPP: 1.05 (ref 0.85–1.19)
PROTHROMBIN TIME: 14.5 SECONDS (ref 12.3–15)

## 2024-09-23 PROCEDURE — 84484 ASSAY OF TROPONIN QUANT: CPT | Performed by: STUDENT IN AN ORGANIZED HEALTH CARE EDUCATION/TRAINING PROGRAM

## 2024-09-23 PROCEDURE — C1713 ANCHOR/SCREW BN/BN,TIS/BN: HCPCS | Performed by: ORTHOPAEDIC SURGERY

## 2024-09-23 PROCEDURE — C1776 JOINT DEVICE (IMPLANTABLE): HCPCS | Performed by: ORTHOPAEDIC SURGERY

## 2024-09-23 PROCEDURE — 99214 OFFICE O/P EST MOD 30 MIN: CPT | Performed by: INTERNAL MEDICINE

## 2024-09-23 PROCEDURE — 85610 PROTHROMBIN TIME: CPT | Performed by: STUDENT IN AN ORGANIZED HEALTH CARE EDUCATION/TRAINING PROGRAM

## 2024-09-23 PROCEDURE — 27447 TOTAL KNEE ARTHROPLASTY: CPT | Performed by: PHYSICIAN ASSISTANT

## 2024-09-23 PROCEDURE — 93005 ELECTROCARDIOGRAM TRACING: CPT

## 2024-09-23 PROCEDURE — 97163 PT EVAL HIGH COMPLEX 45 MIN: CPT

## 2024-09-23 PROCEDURE — 82948 REAGENT STRIP/BLOOD GLUCOSE: CPT

## 2024-09-23 PROCEDURE — C9290 INJ, BUPIVACAINE LIPOSOME: HCPCS | Performed by: STUDENT IN AN ORGANIZED HEALTH CARE EDUCATION/TRAINING PROGRAM

## 2024-09-23 PROCEDURE — 97167 OT EVAL HIGH COMPLEX 60 MIN: CPT

## 2024-09-23 PROCEDURE — 27447 TOTAL KNEE ARTHROPLASTY: CPT | Performed by: ORTHOPAEDIC SURGERY

## 2024-09-23 DEVICE — SMARTSET HV HIGH VISCOSITY BONE CEMENT 40G
Type: IMPLANTABLE DEVICE | Site: KNEE | Status: FUNCTIONAL
Brand: SMARTSET

## 2024-09-23 DEVICE — ATTUNE PATELLA MEDIALIZED DOME 41MM CEMENTED AOX
Type: IMPLANTABLE DEVICE | Site: KNEE | Status: FUNCTIONAL
Brand: ATTUNE

## 2024-09-23 DEVICE — ATTUNE KNEE SYSTEM FEMORAL CRUCIATE RETAINING SIZE 9 LEFT CEMENTED
Type: IMPLANTABLE DEVICE | Site: KNEE | Status: FUNCTIONAL
Brand: ATTUNE

## 2024-09-23 DEVICE — ATTUNE KNEE SYSTEM TIBIAL BASE FIXED BEARING SIZE 9 CEMENTED
Type: IMPLANTABLE DEVICE | Site: KNEE | Status: FUNCTIONAL
Brand: ATTUNE

## 2024-09-23 RX ORDER — DULOXETIN HYDROCHLORIDE 60 MG/1
60 CAPSULE, DELAYED RELEASE ORAL DAILY
Status: DISCONTINUED | OUTPATIENT
Start: 2024-09-24 | End: 2024-09-24 | Stop reason: HOSPADM

## 2024-09-23 RX ORDER — CHLORHEXIDINE GLUCONATE 40 MG/ML
SOLUTION TOPICAL DAILY PRN
Status: DISCONTINUED | OUTPATIENT
Start: 2024-09-23 | End: 2024-09-23 | Stop reason: HOSPADM

## 2024-09-23 RX ORDER — POTASSIUM CHLORIDE 1500 MG/1
20 TABLET, EXTENDED RELEASE ORAL DAILY
Status: DISCONTINUED | OUTPATIENT
Start: 2024-09-24 | End: 2024-09-24 | Stop reason: HOSPADM

## 2024-09-23 RX ORDER — HYDROMORPHONE HCL/PF 1 MG/ML
0.5 SYRINGE (ML) INJECTION
Status: DISCONTINUED | OUTPATIENT
Start: 2024-09-23 | End: 2024-09-23 | Stop reason: HOSPADM

## 2024-09-23 RX ORDER — PROPOFOL 10 MG/ML
INJECTION, EMULSION INTRAVENOUS CONTINUOUS PRN
Status: DISCONTINUED | OUTPATIENT
Start: 2024-09-23 | End: 2024-09-23

## 2024-09-23 RX ORDER — METOPROLOL SUCCINATE 25 MG/1
25 TABLET, EXTENDED RELEASE ORAL 2 TIMES DAILY
Status: DISCONTINUED | OUTPATIENT
Start: 2024-09-23 | End: 2024-09-24 | Stop reason: HOSPADM

## 2024-09-23 RX ORDER — AMIODARONE HYDROCHLORIDE 150 MG/3ML
INJECTION, SOLUTION INTRAVENOUS AS NEEDED
Status: DISCONTINUED | OUTPATIENT
Start: 2024-09-23 | End: 2024-09-23

## 2024-09-23 RX ORDER — TAMSULOSIN HYDROCHLORIDE 0.4 MG/1
0.4 CAPSULE ORAL
Status: DISCONTINUED | OUTPATIENT
Start: 2024-09-23 | End: 2024-09-24 | Stop reason: HOSPADM

## 2024-09-23 RX ORDER — TORSEMIDE 10 MG/1
10 TABLET ORAL DAILY
Status: DISCONTINUED | OUTPATIENT
Start: 2024-09-24 | End: 2024-09-24 | Stop reason: HOSPADM

## 2024-09-23 RX ORDER — WARFARIN SODIUM 2.5 MG/1
2.5 TABLET ORAL
Status: DISCONTINUED | OUTPATIENT
Start: 2024-09-23 | End: 2024-09-24 | Stop reason: HOSPADM

## 2024-09-23 RX ORDER — ENOXAPARIN SODIUM 100 MG/ML
40 INJECTION SUBCUTANEOUS DAILY
Status: DISCONTINUED | OUTPATIENT
Start: 2024-09-24 | End: 2024-09-24 | Stop reason: HOSPADM

## 2024-09-23 RX ORDER — KETOROLAC TROMETHAMINE 30 MG/ML
INJECTION, SOLUTION INTRAMUSCULAR; INTRAVENOUS AS NEEDED
Status: DISCONTINUED | OUTPATIENT
Start: 2024-09-23 | End: 2024-09-23 | Stop reason: HOSPADM

## 2024-09-23 RX ORDER — FERROUS SULFATE 325(65) MG
325 TABLET ORAL 2 TIMES DAILY WITH MEALS
Status: DISCONTINUED | OUTPATIENT
Start: 2024-09-23 | End: 2024-09-24 | Stop reason: HOSPADM

## 2024-09-23 RX ORDER — ESMOLOL HYDROCHLORIDE 10 MG/ML
INJECTION INTRAVENOUS AS NEEDED
Status: DISCONTINUED | OUTPATIENT
Start: 2024-09-23 | End: 2024-09-23

## 2024-09-23 RX ORDER — ONDANSETRON 2 MG/ML
INJECTION INTRAMUSCULAR; INTRAVENOUS AS NEEDED
Status: DISCONTINUED | OUTPATIENT
Start: 2024-09-23 | End: 2024-09-23

## 2024-09-23 RX ORDER — ZOLPIDEM TARTRATE 5 MG/1
10 TABLET ORAL
Status: DISCONTINUED | OUTPATIENT
Start: 2024-09-23 | End: 2024-09-24 | Stop reason: HOSPADM

## 2024-09-23 RX ORDER — TRANEXAMIC ACID 100 MG/ML
INJECTION, SOLUTION INTRAVENOUS AS NEEDED
Status: DISCONTINUED | OUTPATIENT
Start: 2024-09-23 | End: 2024-09-23 | Stop reason: HOSPADM

## 2024-09-23 RX ORDER — OXYCODONE HYDROCHLORIDE 5 MG/1
5 TABLET ORAL EVERY 4 HOURS PRN
Status: DISCONTINUED | OUTPATIENT
Start: 2024-09-23 | End: 2024-09-24

## 2024-09-23 RX ORDER — ONDANSETRON 2 MG/ML
4 INJECTION INTRAMUSCULAR; INTRAVENOUS EVERY 6 HOURS PRN
Status: DISCONTINUED | OUTPATIENT
Start: 2024-09-23 | End: 2024-09-24 | Stop reason: HOSPADM

## 2024-09-23 RX ORDER — DOCUSATE SODIUM 100 MG/1
100 CAPSULE, LIQUID FILLED ORAL 2 TIMES DAILY
Status: DISCONTINUED | OUTPATIENT
Start: 2024-09-23 | End: 2024-09-24 | Stop reason: HOSPADM

## 2024-09-23 RX ORDER — BUPIVACAINE HYDROCHLORIDE AND EPINEPHRINE 5; 5 MG/ML; UG/ML
INJECTION, SOLUTION PERINEURAL AS NEEDED
Status: DISCONTINUED | OUTPATIENT
Start: 2024-09-23 | End: 2024-09-23 | Stop reason: HOSPADM

## 2024-09-23 RX ORDER — METOPROLOL TARTRATE 1 MG/ML
INJECTION, SOLUTION INTRAVENOUS AS NEEDED
Status: DISCONTINUED | OUTPATIENT
Start: 2024-09-23 | End: 2024-09-23

## 2024-09-23 RX ORDER — EPHEDRINE SULFATE 50 MG/ML
INJECTION INTRAVENOUS AS NEEDED
Status: DISCONTINUED | OUTPATIENT
Start: 2024-09-23 | End: 2024-09-23

## 2024-09-23 RX ORDER — TRANEXAMIC ACID 10 MG/ML
1000 INJECTION, SOLUTION INTRAVENOUS ONCE
Status: COMPLETED | OUTPATIENT
Start: 2024-09-23 | End: 2024-09-23

## 2024-09-23 RX ORDER — HYDROMORPHONE HCL/PF 1 MG/ML
0.5 SYRINGE (ML) INJECTION EVERY 2 HOUR PRN
Status: DISCONTINUED | OUTPATIENT
Start: 2024-09-23 | End: 2024-09-24

## 2024-09-23 RX ORDER — METOPROLOL SUCCINATE 25 MG/1
25 TABLET, EXTENDED RELEASE ORAL DAILY
Status: DISCONTINUED | OUTPATIENT
Start: 2024-09-24 | End: 2024-09-23

## 2024-09-23 RX ORDER — FOLIC ACID 1 MG/1
1 TABLET ORAL DAILY
Status: DISCONTINUED | OUTPATIENT
Start: 2024-09-24 | End: 2024-09-24 | Stop reason: HOSPADM

## 2024-09-23 RX ORDER — DEXAMETHASONE SODIUM PHOSPHATE 10 MG/ML
INJECTION, SOLUTION INTRAMUSCULAR; INTRAVENOUS AS NEEDED
Status: DISCONTINUED | OUTPATIENT
Start: 2024-09-23 | End: 2024-09-23

## 2024-09-23 RX ORDER — MAGNESIUM HYDROXIDE 1200 MG/15ML
LIQUID ORAL AS NEEDED
Status: DISCONTINUED | OUTPATIENT
Start: 2024-09-23 | End: 2024-09-23 | Stop reason: HOSPADM

## 2024-09-23 RX ORDER — ONDANSETRON 4 MG/1
4 TABLET, FILM COATED ORAL EVERY 8 HOURS PRN
Qty: 5 TABLET | Refills: 0 | Status: SHIPPED | OUTPATIENT
Start: 2024-09-23

## 2024-09-23 RX ORDER — ACETAMINOPHEN 325 MG/1
650 TABLET ORAL EVERY 6 HOURS PRN
Status: DISCONTINUED | OUTPATIENT
Start: 2024-09-23 | End: 2024-09-24 | Stop reason: HOSPADM

## 2024-09-23 RX ORDER — OXYCODONE HYDROCHLORIDE 10 MG/1
10 TABLET ORAL EVERY 4 HOURS PRN
Status: DISCONTINUED | OUTPATIENT
Start: 2024-09-23 | End: 2024-09-24

## 2024-09-23 RX ORDER — ASCORBIC ACID 500 MG
500 TABLET ORAL 2 TIMES DAILY
Status: DISCONTINUED | OUTPATIENT
Start: 2024-09-23 | End: 2024-09-24 | Stop reason: HOSPADM

## 2024-09-23 RX ORDER — SENNOSIDES 8.6 MG
1 TABLET ORAL DAILY
Status: DISCONTINUED | OUTPATIENT
Start: 2024-09-24 | End: 2024-09-24 | Stop reason: HOSPADM

## 2024-09-23 RX ORDER — ACETAMINOPHEN 325 MG/1
975 TABLET ORAL ONCE
Status: DISCONTINUED | OUTPATIENT
Start: 2024-09-23 | End: 2024-09-23 | Stop reason: HOSPADM

## 2024-09-23 RX ORDER — CALCIUM CARBONATE 500 MG/1
1000 TABLET, CHEWABLE ORAL DAILY PRN
Status: DISCONTINUED | OUTPATIENT
Start: 2024-09-23 | End: 2024-09-24 | Stop reason: HOSPADM

## 2024-09-23 RX ORDER — CEFAZOLIN SODIUM 1 G/50ML
1000 SOLUTION INTRAVENOUS EVERY 8 HOURS
Status: COMPLETED | OUTPATIENT
Start: 2024-09-23 | End: 2024-09-24

## 2024-09-23 RX ORDER — SODIUM CHLORIDE, SODIUM LACTATE, POTASSIUM CHLORIDE, CALCIUM CHLORIDE 600; 310; 30; 20 MG/100ML; MG/100ML; MG/100ML; MG/100ML
75 INJECTION, SOLUTION INTRAVENOUS CONTINUOUS
Status: DISCONTINUED | OUTPATIENT
Start: 2024-09-23 | End: 2024-09-24 | Stop reason: HOSPADM

## 2024-09-23 RX ORDER — MIDAZOLAM HYDROCHLORIDE 2 MG/2ML
INJECTION, SOLUTION INTRAMUSCULAR; INTRAVENOUS AS NEEDED
Status: DISCONTINUED | OUTPATIENT
Start: 2024-09-23 | End: 2024-09-23

## 2024-09-23 RX ORDER — METHOCARBAMOL 750 MG/1
750 TABLET, FILM COATED ORAL EVERY 6 HOURS SCHEDULED
Status: DISCONTINUED | OUTPATIENT
Start: 2024-09-23 | End: 2024-09-24

## 2024-09-23 RX ORDER — SODIUM CHLORIDE, SODIUM LACTATE, POTASSIUM CHLORIDE, CALCIUM CHLORIDE 600; 310; 30; 20 MG/100ML; MG/100ML; MG/100ML; MG/100ML
125 INJECTION, SOLUTION INTRAVENOUS CONTINUOUS
Status: DISCONTINUED | OUTPATIENT
Start: 2024-09-23 | End: 2024-09-24 | Stop reason: HOSPADM

## 2024-09-23 RX ORDER — OXYCODONE HYDROCHLORIDE 5 MG/1
5 TABLET ORAL EVERY 6 HOURS PRN
Qty: 30 TABLET | Refills: 0 | Status: SHIPPED | OUTPATIENT
Start: 2024-09-23 | End: 2024-10-03

## 2024-09-23 RX ORDER — BUPIVACAINE HYDROCHLORIDE 7.5 MG/ML
INJECTION, SOLUTION INTRASPINAL AS NEEDED
Status: DISCONTINUED | OUTPATIENT
Start: 2024-09-23 | End: 2024-09-23

## 2024-09-23 RX ORDER — CEFAZOLIN SODIUM 2 G/50ML
SOLUTION INTRAVENOUS AS NEEDED
Status: DISCONTINUED | OUTPATIENT
Start: 2024-09-23 | End: 2024-09-23

## 2024-09-23 RX ORDER — DOCUSATE SODIUM 100 MG/1
100 CAPSULE, LIQUID FILLED ORAL 2 TIMES DAILY
Qty: 10 CAPSULE | Refills: 0 | Status: SHIPPED | OUTPATIENT
Start: 2024-09-23

## 2024-09-23 RX ORDER — FENTANYL CITRATE/PF 50 MCG/ML
25 SYRINGE (ML) INJECTION
Status: DISCONTINUED | OUTPATIENT
Start: 2024-09-23 | End: 2024-09-23 | Stop reason: HOSPADM

## 2024-09-23 RX ORDER — ONDANSETRON 2 MG/ML
4 INJECTION INTRAMUSCULAR; INTRAVENOUS ONCE AS NEEDED
Status: DISCONTINUED | OUTPATIENT
Start: 2024-09-23 | End: 2024-09-23 | Stop reason: HOSPADM

## 2024-09-23 RX ORDER — GABAPENTIN 300 MG/1
300 CAPSULE ORAL
Status: DISCONTINUED | OUTPATIENT
Start: 2024-09-23 | End: 2024-09-24 | Stop reason: HOSPADM

## 2024-09-23 RX ORDER — LIDOCAINE HYDROCHLORIDE 20 MG/ML
INJECTION, SOLUTION EPIDURAL; INFILTRATION; INTRACAUDAL; PERINEURAL AS NEEDED
Status: DISCONTINUED | OUTPATIENT
Start: 2024-09-23 | End: 2024-09-23

## 2024-09-23 RX ORDER — ATORVASTATIN CALCIUM 40 MG/1
80 TABLET, FILM COATED ORAL EVERY EVENING
Status: DISCONTINUED | OUTPATIENT
Start: 2024-09-23 | End: 2024-09-24 | Stop reason: HOSPADM

## 2024-09-23 RX ORDER — CHLORHEXIDINE GLUCONATE ORAL RINSE 1.2 MG/ML
15 SOLUTION DENTAL ONCE
Status: DISCONTINUED | OUTPATIENT
Start: 2024-09-23 | End: 2024-09-23 | Stop reason: HOSPADM

## 2024-09-23 RX ORDER — SENNOSIDES 8.6 MG
650 CAPSULE ORAL EVERY 8 HOURS PRN
Qty: 30 TABLET | Refills: 0 | Status: SHIPPED | OUTPATIENT
Start: 2024-09-23 | End: 2024-10-23

## 2024-09-23 RX ORDER — EZETIMIBE 10 MG/1
10 TABLET ORAL DAILY
Status: DISCONTINUED | OUTPATIENT
Start: 2024-09-24 | End: 2024-09-24 | Stop reason: HOSPADM

## 2024-09-23 RX ORDER — FERROUS SULFATE 324(65)MG
324 TABLET, DELAYED RELEASE (ENTERIC COATED) ORAL
Qty: 90 TABLET | Refills: 1 | Status: SHIPPED | OUTPATIENT
Start: 2024-09-23

## 2024-09-23 RX ORDER — BUPIVACAINE HYDROCHLORIDE 2.5 MG/ML
INJECTION, SOLUTION EPIDURAL; INFILTRATION; INTRACAUDAL
Status: COMPLETED | OUTPATIENT
Start: 2024-09-23 | End: 2024-09-23

## 2024-09-23 RX ADMIN — SODIUM CHLORIDE, SODIUM LACTATE, POTASSIUM CHLORIDE, AND CALCIUM CHLORIDE 75 ML/HR: .6; .31; .03; .02 INJECTION, SOLUTION INTRAVENOUS at 18:04

## 2024-09-23 RX ADMIN — WARFARIN SODIUM 2.5 MG: 2.5 TABLET ORAL at 21:01

## 2024-09-23 RX ADMIN — METOPROLOL SUCCINATE 25 MG: 25 TABLET, EXTENDED RELEASE ORAL at 21:00

## 2024-09-23 RX ADMIN — TAMSULOSIN HYDROCHLORIDE 0.4 MG: 0.4 CAPSULE ORAL at 18:04

## 2024-09-23 RX ADMIN — EPHEDRINE SULFATE 10 MG: 50 INJECTION INTRAVENOUS at 12:53

## 2024-09-23 RX ADMIN — BUPIVACAINE 20 ML: 13.3 INJECTION, SUSPENSION, LIPOSOMAL INFILTRATION at 11:45

## 2024-09-23 RX ADMIN — CEFAZOLIN SODIUM 2000 MG: 2 SOLUTION INTRAVENOUS at 12:49

## 2024-09-23 RX ADMIN — PROPOFOL 100 MCG/KG/MIN: 10 INJECTION, EMULSION INTRAVENOUS at 12:44

## 2024-09-23 RX ADMIN — SODIUM CHLORIDE, SODIUM LACTATE, POTASSIUM CHLORIDE, AND CALCIUM CHLORIDE: .6; .31; .03; .02 INJECTION, SOLUTION INTRAVENOUS at 13:06

## 2024-09-23 RX ADMIN — LIDOCAINE HYDROCHLORIDE 100 MG: 20 INJECTION, SOLUTION EPIDURAL; INFILTRATION; INTRACAUDAL at 12:44

## 2024-09-23 RX ADMIN — PHENYLEPHRINE HYDROCHLORIDE 20 MCG/MIN: 50 INJECTION INTRAVENOUS at 12:48

## 2024-09-23 RX ADMIN — OXYCODONE HYDROCHLORIDE AND ACETAMINOPHEN 500 MG: 500 TABLET ORAL at 18:04

## 2024-09-23 RX ADMIN — SODIUM CHLORIDE, SODIUM LACTATE, POTASSIUM CHLORIDE, AND CALCIUM CHLORIDE: .6; .31; .03; .02 INJECTION, SOLUTION INTRAVENOUS at 12:18

## 2024-09-23 RX ADMIN — EPHEDRINE SULFATE 10 MG: 50 INJECTION INTRAVENOUS at 13:00

## 2024-09-23 RX ADMIN — NOREPINEPHRINE BITARTRATE 8 MCG/MIN: 1 INJECTION INTRAVENOUS at 13:20

## 2024-09-23 RX ADMIN — DOCUSATE SODIUM 100 MG: 100 CAPSULE, LIQUID FILLED ORAL at 18:04

## 2024-09-23 RX ADMIN — TRANEXAMIC ACID 1000 MG: 10 INJECTION, SOLUTION INTRAVENOUS at 12:49

## 2024-09-23 RX ADMIN — BUPIVACAINE HYDROCHLORIDE IN DEXTROSE 1.6 ML: 7.5 INJECTION, SOLUTION SUBARACHNOID at 12:40

## 2024-09-23 RX ADMIN — METHOCARBAMOL TABLETS 750 MG: 750 TABLET, COATED ORAL at 18:04

## 2024-09-23 RX ADMIN — ESMOLOL HYDROCHLORIDE 50 MG: 10 INJECTION INTRAVENOUS at 13:09

## 2024-09-23 RX ADMIN — ONDANSETRON 4 MG: 2 INJECTION INTRAMUSCULAR; INTRAVENOUS at 12:44

## 2024-09-23 RX ADMIN — BUPIVACAINE HYDROCHLORIDE 5 ML: 2.5 INJECTION, SOLUTION EPIDURAL; INFILTRATION; INTRACAUDAL; PERINEURAL at 11:45

## 2024-09-23 RX ADMIN — AMIODARONE HYDROCHLORIDE 150 MG: 50 INJECTION, SOLUTION INTRAVENOUS at 13:16

## 2024-09-23 RX ADMIN — METHOCARBAMOL TABLETS 750 MG: 750 TABLET, COATED ORAL at 23:30

## 2024-09-23 RX ADMIN — CEFAZOLIN SODIUM 1000 MG: 1 SOLUTION INTRAVENOUS at 21:00

## 2024-09-23 RX ADMIN — MIDAZOLAM 2 MG: 1 INJECTION INTRAMUSCULAR; INTRAVENOUS at 11:44

## 2024-09-23 RX ADMIN — METOROPROLOL TARTRATE 5 MG: 5 INJECTION, SOLUTION INTRAVENOUS at 14:33

## 2024-09-23 RX ADMIN — EPHEDRINE SULFATE 10 MG: 50 INJECTION INTRAVENOUS at 13:12

## 2024-09-23 RX ADMIN — DEXAMETHASONE SODIUM PHOSPHATE 10 MG: 10 INJECTION, SOLUTION INTRAMUSCULAR; INTRAVENOUS at 12:44

## 2024-09-23 RX ADMIN — GABAPENTIN 300 MG: 300 CAPSULE ORAL at 21:01

## 2024-09-23 RX ADMIN — ATORVASTATIN CALCIUM 80 MG: 40 TABLET, FILM COATED ORAL at 18:04

## 2024-09-23 NOTE — PLAN OF CARE
Problem: OCCUPATIONAL THERAPY ADULT  Goal: Performs self-care activities at highest level of function for planned discharge setting.  See evaluation for individualized goals.  Description: Treatment Interventions: ADL retraining, Functional transfer training, Endurance training, Patient/family training, Equipment evaluation/education, Compensatory technique education, Activityengagement          See flowsheet documentation for full assessment, interventions and recommendations.   Note: Limitation: Decreased ADL status, Decreased endurance, Decreased self-care trans, Decreased high-level ADLs  Prognosis: Good  Assessment: Patient is a 74 y.o. male seen for OT evaluation following admission on 9/23/2024  s/p S/P total knee arthroplasty, left. Please see above for comprehensive list of comorbidities and significant PMHx impacting functional performance. Upon initial evaluation, pt appears to be performing below baseline functional status. Pt requires independent for UB ADLs, supervision for LB ADLs, supervision for bed mobility, supervision for transfers and supervision for functional mobility household distance with RW. The AM-PAC & Barthel Index outcome tools were used to assist in determining pt safety w/self care /mobility and appropriate d/c recommendations, see above for score. Occupational performance is affected by the following deficits: endurance , decreased balance , decreased standing tolerance for self care tasks , decreased dynamic balance impacting functional reach, decreased functional reach , and decreased activity tolerance . Personal/Environmental factors impacting D/C include: steps to enter/navigate the home, FOS to second floor, Assistance needed for ADL/IADLs, Assistance needed for ADLs and functional mobility, and High fall risk . Supporting factors include: able to maintain FFSU, support system available, and attitude towards recovery . Patient would benefit from OT services within the acute  care setting to maximize level of functional independence in the following areas fall prevention , self-care transfers, bed mobility , functional mobility, and ADLs.  From OT standpoint, recommendation at time of D/C would be Level IIII (No post-acute rehabilitation needs) .     Rehab Resource Intensity Level, OT: No post-acute rehabilitation needs     Frances Atkins MS OTR/L   [place] Licensure# [id number]

## 2024-09-23 NOTE — ANESTHESIA PROCEDURE NOTES
Spinal Block    Patient location during procedure: OR  Start time: 9/23/2024 12:40 PM  Reason for block: procedure for pain and at surgeon's request  Staffing  Performed by: Lidya Ahmadi CRNA  Authorized by: Marcell Bhatti MD    Preanesthetic Checklist  Completed: patient identified, IV checked, site marked, risks and benefits discussed, surgical consent, monitors and equipment checked, pre-op evaluation and timeout performed  Spinal Block  Patient position: sitting  Prep: ChloraPrep and site prepped and draped  Patient monitoring: frequent blood pressure checks, continuous pulse ox and heart rate  Approach: midline  Location: L3-4  Needle  Needle type: Pencan   Needle gauge: 24 G  Needle length: 4 in  Assessment  Sensory level: T4  Injection Assessment:  negative aspiration for heme, no paresthesia on injection and positive aspiration for clear CSF.  Post-procedure:  site cleaned

## 2024-09-23 NOTE — PHYSICAL THERAPY NOTE
PHYSICAL THERAPY EVALUATION NOTE    Patient Name: Tian Garcia  Today's Date: 9/23/2024  AGE:   74 y.o.  Mrn:   [id number]  ADMIT DX:  Arthritis of left knee [M17.12]    Past Medical History:   Diagnosis Date    Acute venous embolism and thrombosis of deep vessels of proximal lower extremity (HCC)     Last assessed: 5/18/15    JANEL (acute kidney injury) ([facility])     Arthritis     Cellulitis     LE    Chronic kidney disease 03/2020    Acute Kidney Injury    CPAP (continuous positive airway pressure) dependence     Factor V Leiden ([facility])     Forgetfulness     Gross hematuria 05/17/2022    Headache(784.0) 03/2022    Never till cervical  spine surgery    Heart murmur 03/2020    Told when in hospital    History of transfusion     [place] (hard of hearing)     Hypoalbuminemia 05/11/2022    Other acute osteomyelitis, other site ([facility]) 01/03/2023    Paroxysmal atrial fibrillation ([facility])     Last assessed: 11/2/15    Sleep apnea      Length Of Stay: 0  PHYSICAL THERAPY EVALUATION :    09/23/24 1705   PT Last Visit   PT Visit Date 09/23/24   Pain Assessment   Pain Assessment Tool 0-10   Pain Score No Pain   Restrictions/Precautions   LLE Weight Bearing Per Order WBAT   Other Precautions Chair Alarm;Bed Alarm;WBS;Fall Risk;Pain;Multiple lines  ([place])   Home Living   Type of Home House   Home Layout Two level;Bed/bath upstairs;Able to live on main level with bedroom/bathroom;Other (Comment)  (4 MICHELLE w/ bilateral railings)   Additional Comments lives w/ spouse. ambulates w/ single point cane. owns walker and commode. independnet w/ ADLs and IADLs. no falls in last 6 months.   General   Additional Pertinent History 9/23/24 at 14:27 heart rate was 152 BPM and at 11:25 was 49 BPM.   Family/Caregiver Present Yes   Cognition   Arousal/Participation Alert   Orientation Level Oriented to person;Other (Comment)  (pt was identified w/ full name, ID bracelet)    Following Commands Follows one step commands without difficulty   Comments room air resteing pulse ox 97% and 91 BPM, active 95% and 118 BPM. supine blood pressure 144/103.   Subjective   Subjective pt seen supine in bed w/ spouse present. pt agreed to PT eval. denied pain or dizziness.   RLE Assessment   RLE Assessment WFL  (4+/5)   LLE Assessment   LLE Assessment X  (hip flexion 3/5 extension 3+/5, knee flexion 2+/5 extension 3-/5, ankle 3-/5)   Vision-Basic Assessment   Current Vision Does not wear glasses   Coordination   Sensation X  (light touch impaired right foot and distal calf)   Bed Mobility   Supine to Sit 5  Supervision   Additional items HOB elevated;Increased time required   Transfers   Sit to Stand 5  Supervision   Additional items Increased time required;Verbal cues  (for hand placement)   Stand to Sit 5  Supervision   Additional items Increased time required;Verbal cues  (for body positioning, hand placement)   Ambulation/Elevation   Gait pattern Antalgic;Foward flexed;Short stride;Excessively slow   Gait Assistance 4  Minimal assist   Additional items Assist x 1;Verbal cues  (for walker positioning, sequencing)   Assistive Device Rolling walker   Distance 25 feet  (additional ambulation not possible due to fatigue)   Stair Management Assistance Not tested  (due to limited ambulation tolerance)   Balance   Static Sitting Good   Dynamic Sitting Fair +   Static Standing Fair -  (w/ roller walker)   Ambulatory Fair -  (w/ roller walker)   Activity Tolerance   Activity Tolerance Patient limited by fatigue   Nurse Made Aware spoke to Frances Esparza OT   Assessment   Problem List Decreased strength;Decreased range of motion;Decreased endurance;Impaired balance;Decreased mobility;Decreased safety awareness;Impaired sensation   Assessment Pt presents secondary to treatment of left knee DJD with varus deformity and flexion contracture of about 10 to 15 degrees, which has failed conservative treatment.  Dx: left knee DJD. 9/23/24 left TKA. order placed for PT eval and tx, w/ activity order of up w/ assist and WBAT L LE. pt presents w/ comorbidities of right TKA, CKD, anemia, DVT, arthritis, cellulitis, Factor V Leiden, osteomyelitis, neck and back surgeries, and cataract extraction and personal factors of living in 2 story house and stair(s) to enter home. pt presents w/ weakness, decreased ROM, decreased endurance, impaired balance, gait deviations, altered sensation, decreased safety awareness, orthopedic restrictions, and fall risk. these impairments are evident in findings from physical examination (weakness, decreased ROM, and altered sensation), mobility assessment (need for standby to min assist w/ all phases of mobility when usually mobilizing independently, tolerance to only 25 feet of ambulation, and need for cueing for mobility technique), and Barthel Index: 55/100. pt needed input for mobility technique. pt is at risk for falls due to physical and safety awareness deficits. pt's clinical presentation is unstable/unpredictable (evident in tachycardia, need for assist w/ all phases of mobility when usually mobilizing independently, tolerance to only 25 feet of ambulation, and need for input for mobility technique/safety). pt needs inpatient PT tx to improve mobility deficits and progress mobility training as appropriate.   Goals   Patient Goals I want to go home   STG Expiration Date 10/03/24   Short Term Goal #1 pt will: Increase L LE strength 1/2 grade to facilitate independent mobility, Perform bed mobility independently to increase level of independence, Perform all transfers independently to improve independence, Ambulate 300 ft. with roller walker independently w/o LOB to improve functional independence, Navigate 4 stair(s) independently with bilateral handrails to facilitate return to previous living environment, Increase ambulatory balance 1 grade to decrease risk for falls, Complete exercise  program independently to increase strength and endurance, Tolerate 3 hr OOB to faciliate upright tolerance, Tolerate standing 2 minutes independently to facilitate functional task performance, Improve Barthel Index score to 75 or greater to facilitate independence, and Complete Timed Up and Go or Comfortable Gait Speed to further assess mobility and monitor progress   PT Treatment Day 0   Plan   Treatment/Interventions Functional transfer training;LE strengthening/ROM;Elevations;Therapeutic exercise;Endurance training;Gait training;Bed mobility;Equipment eval/education;Patient/family training;Compensatory technique education   PT Frequency Twice a day   Discharge Recommendation   Rehab Resource Intensity Level, PT III (Minimum Resource Intensity)   AM-PAC Basic Mobility Inpatient   Turning in Flat Bed Without Bedrails 3   Lying on Back to Sitting on Edge of Flat Bed Without Bedrails 3   Moving Bed to Chair 3   Standing Up From Chair Using Arms 3   Walk in Room 3   Climb 3-5 Stairs With Railing 2   Basic Mobility Inpatient Raw Score 17   Basic Mobility Standardized Score 39.67   [facility] Highest Level Of Mobility   -[facility] Goal 5: Stand one or more mins   -[facility] Achieved 7: Walk 25 feet or more   Barthel Index   Feeding 10   Bathing 0   Grooming Score 5   Dressing Score 5   Bladder Score 10   Bowels Score 10   Toilet Use Score 5   Transfers (Bed/Chair) Score 10   Mobility (Level Surface) Score 0   Stairs Score 0   Barthel Index Score 55   End of Consult   Patient Position at End of Consult Bedside chair;Bed/Chair alarm activated;All needs within reach     The patient's AM-[facility] Basic Mobility Inpatient Short Form Raw Score is 17. A Raw score of greater than 16 suggests the patient may benefit from discharge to home. Please also refer to the recommendation of the Physical Therapist for safe discharge planning.    Skilled PT recommended while in hospital and upon DC to progress pt toward treatment goals.     Francesco  Nakia, PT

## 2024-09-23 NOTE — ANESTHESIA PROCEDURE NOTES
Peripheral Block    Patient location during procedure: holding area  Start time: 9/23/2024 11:45 AM  Reason for block: at surgeon's request and post-op pain management  Staffing  Performed by: Marcell Bhatti MD  Authorized by: Marcell Bhatti MD    Preanesthetic Checklist  Completed: patient identified, IV checked, site marked, risks and benefits discussed, surgical consent, monitors and equipment checked, pre-op evaluation and timeout performed  Peripheral Block  Patient position: supine  Prep: ChloraPrep  Patient monitoring: continuous pulse ox and frequent blood pressure checks  Block type: adductor canal block  Laterality: left  Injection technique: single-shot  Procedures: ultrasound guided, Ultrasound guidance required for the procedure to increase accuracy and safety of medication placement and decrease risk of complications.  Ultrasound permanent image saved  bupivacaine (PF) (MARCAINE) 0.25 % injection 20 mL - Perineural   5 mL - 9/23/2024 11:45:00 AM  Needle  Needle type: Stimuplex   Needle gauge: 20 G  Needle length: 4 in  Needle localization: ultrasound guidance  Assessment  Injection assessment: incremental injection and local visualized surrounding nerve on ultrasound  Paresthesia pain: none  patient tolerated the procedure well with no immediate complications  Additional Notes  Uncomplicated adductor canal block  Superficial femoral artery identified  Dose: 20ml exparel + 5ml 0.25% bupivacaine

## 2024-09-23 NOTE — PLAN OF CARE
Problem: PHYSICAL THERAPY ADULT  Goal: Performs mobility at highest level of function for planned discharge setting.  See evaluation for individualized goals.  Description: Treatment/Interventions: Functional transfer training, LE strengthening/ROM, Elevations, Therapeutic exercise, Endurance training, Gait training, Bed mobility, Equipment eval/education, Patient/family training, Compensatory technique education          See flowsheet documentation for full assessment, interventions and recommendations.  Note:    Problem List: Decreased strength, Decreased range of motion, Decreased endurance, Impaired balance, Decreased mobility, Decreased safety awareness, Impaired sensation  Assessment: Pt presents secondary to treatment of left knee DJD with varus deformity and flexion contracture of about 10 to 15 degrees, which has failed conservative treatment. Dx: left knee DJD. 9/23/24 left TKA. order placed for PT eval and tx, w/ activity order of up w/ assist and WBAT L LE. pt presents w/ comorbidities of right TKA, CKD, anemia, DVT, arthritis, cellulitis, Factor V Leiden, osteomyelitis, neck and back surgeries, and cataract extraction and personal factors of living in 2 story house and stair(s) to enter home. pt presents w/ weakness, decreased ROM, decreased endurance, impaired balance, gait deviations, altered sensation, decreased safety awareness, orthopedic restrictions, and fall risk. these impairments are evident in findings from physical examination (weakness, decreased ROM, and altered sensation), mobility assessment (need for standby to min assist w/ all phases of mobility when usually mobilizing independently, tolerance to only 25 feet of ambulation, and need for cueing for mobility technique), and Barthel Index: 55/100. pt needed input for mobility technique. pt is at risk for falls due to physical and safety awareness deficits. pt's clinical presentation is unstable/unpredictable (evident in tachycardia,  need for assist w/ all phases of mobility when usually mobilizing independently, tolerance to only 25 feet of ambulation, and need for input for mobility technique/safety). pt needs inpatient PT tx to improve mobility deficits and progress mobility training as appropriate.        Rehab Resource Intensity Level, PT: III (Minimum Resource Intensity)    See flowsheet documentation for full assessment.

## 2024-09-23 NOTE — DISCHARGE INSTR - AVS FIRST PAGE
Discharge Instructions - Orthopedics  Tian Garcia 74 y.o. male MRN: [id number]  Unit/Bed#: [facility] OR MAIN    Weight Bearing Status:                                           Weight Bearing as tolerated to the left lower extremity.    DVT prophylaxis:  Continue Coumadin as directed    Pain:  Start oxycodone 5 mg every 6 hrs after last dose taken after surgery for 1 day  Transition to oxycodone 5 mg every 6 hours as needed    Showering Instructions:   Do not shower until 5 days from date of surgery  Do not soak your incision(Tubs, Jacuzzi's, pools, ext)    Dressing Instructions:   May remove dressing 5 days from date of surgery OR may leave dressing in place until follow up office visit 2 weeks from surgery date  Keep dressing/incision clean and intact until follow up appointment.  Do not apply any creams or ointments/lotions to your incisions including antibiotic ointment, peroxide    Driving Instructions:  No driving until cleared by Orthopaedic Surgery.    PT/OT:  Continue PT/OT on outpatient basis as directed  Continue motion and strengthening exercises while at home    Appt Instructions:   If you do not have your appointment, please call the clinic at [phone]  Otherwise followup as scheduled    Contact the office sooner if you experience any increased numbness/tingling in the extremities.

## 2024-09-23 NOTE — INTERVAL H&P NOTE
H&P reviewed. After examining the patient I find no changes in the patients condition since the H&P had been written.    Vitals:    09/23/24 1125   BP: 109/71   Pulse: (!) 49   Resp: 18   Temp: (!) 96.8 °F (36 °C)   SpO2: 97%   Patient seen and examined  General: No apparent distress  CV: S1-S2  Abdomen: Soft  Chest: No audible wheezing  Left lower extremity: No abrasions or open wounds; mild effusion; neurologically vascular intact distally  To the operating room for left total knee arthroplasty  Pros and cons of operative and nonoperative intervention discussed with patient  We will follow-up postoperatively

## 2024-09-23 NOTE — CONSULTS
Consultation - Cardiology Team One  Tian Garcia 74 y.o. male MRN: [id number]  Unit/Bed#: OR POOL Encounter: [id number]    Inpatient consult to Cardiology  Consult performed by: DEEPTHI Mata  Consult ordered by: Marcell Bhatti MD          Physician Requesting Consult: Dot Galindo MD    Reason for Consult / Principal Problem: atrial fibrillation with RVR      Assessment/ Plan:    1. Paroxysmal/Persistent atrial fibrillation: pt with known hx of; looks like he is typicalyl in atrial fibrillation; all ekgs 4/2022 show afib.  Rate controlled with metoprolol succinate 25mg daily; AC with warfarin. He thinks he took his BB this AM but cannot be totally sure.   Became tachycardic in OR with labile BP; he was given IV amio, IV esmolol and IV metoprolol with improvement in his HR.   Current HR 90-110s mostly, SBP 137mmHg after getting IV metoprolol.  Tachycardia likely compensatory with labile BP in OR with anesthesia. Will give extra dose of metoprolol succinate this evening at about 7pm. Would increase to BID dosing as OP.   Resume warfarin at discretion of surgical team and will be followed by PCP as OP.     2. Hx of ischemic cardiomyopathy with recovered EF; most recent echo 8/2024 showed depressed EF again 37% with global hypokinesis.   GDMT: metoprolol succinate 25mg daily and lisinopril 5mg daily  Volume: torsemide 20mg daily; resume once taking PO; no s/s volume overload   Unclear etiology of recent drop; follow up OP with primary cardiologist; possible rhythm/rate related? May benefit from rhythm control.  No IP workup for this, can follow up with his OP cardiologist.   3. CAD: prior STEMI 4/2023 with PCI/ROBERT to prox-mid LAD  No anginal symptoms.   On ASA, statin, BB as OP; continue.  4. HTN: /78  5. CKD: most recent Cr on pre-op labs 2.0  6. Mod AS: follow up OP    History of Present Illness     HPI: Tian Garcia is a 74 y.o. year old male who has a history of PAF on warfarin,  HFrEF, ischemic cardiomyopathy, HTN, mod AS, CKD 4, CAD (prior STEMI 4/2023, PCI to prox-mid LAD). He follows with cardiologist .           Pt with above hx who presented today and underwent elective TKA. During surgery he was noted to be in rapid atrial fibrillation with heart rates up to the 150s with mild hypotension.  He was given 150 mg of IV metoprolol, 50 mg of IV esmolol, then 5 mg of IV Lopressor at 1400.  Cardiology asked to see regarding further management.     Postoperative EKG showed atrial fibrillation with a previously seen right bundle branch block with ventricular rate of 90.   His recent blood pressure 142/93.    At time of my exam pt is in PACU; he is having pain in surgical site. He denies any palpitations, chest pain or SOB.  P    He is unsure if he took his metoprolol this AM. Has been holding his warfarin; INR 1.05 today.   In OR he received IV amio bolus, esmolol 50mg and metoprolol IV 5mg x once.     Echo 8/2024: LVEF 37%, mod global hypokinesis, mildly dilated RV with normal systolic function, mild aortic regurgitation, mod AS, mild MR.     EKG reviewed personally:   9/23/2024  1500  Atrial fibrillation previously seen RBBB  VR 90    Review of Systems   Constitutional: Negative for decreased appetite and fever.   Cardiovascular:  Negative for chest pain, dyspnea on exertion, leg swelling, orthopnea and palpitations.   Respiratory:  Negative for cough and shortness of breath.    Gastrointestinal:  Negative for nausea and vomiting.   Genitourinary:  Negative for dysuria.   Neurological:  Negative for dizziness and light-headedness.   All other systems reviewed and are negative.    Historical Information   Past Medical History:   Diagnosis Date    Acute venous embolism and thrombosis of deep vessels of proximal lower extremity (HCC)     Last assessed: 5/18/15    JANEL (acute kidney injury) (HCC)     Arthritis     Cellulitis     LE    Chronic kidney disease 03/2020    Acute Kidney  Injury    CPAP (continuous positive airway pressure) dependence     Factor V Leiden ([facility])     Forgetfulness     Gross hematuria 05/17/2022    Headache(784.0) 03/2022    Never till cervical  spine surgery    Heart murmur 03/2020    Told when in hospital    History of transfusion     [place] (hard of hearing)     Hypoalbuminemia 05/11/2022    Other acute osteomyelitis, other site ([facility]) 01/03/2023    Paroxysmal atrial fibrillation ([facility])     Last assessed: 11/2/15    Sleep apnea      Past Surgical History:   Procedure Laterality Date    BACK SURGERY      Lower    CARDIAC CATHETERIZATION N/A 04/09/2023    Procedure: Cardiac pci;  Surgeon: Bird Cast MD;  Location: [facility];  Service: Cardiology    CARDIAC CATHETERIZATION N/A 04/09/2023    Procedure: Cardiac Coronary Angiogram;  Surgeon: Bird Cast MD;  Location: [facility] CARD[facility]AC [facility];  Service: Cardiology    CATARACT EXTRACTION      COLON SURGERY      COLONOSCOPY      INCISION AND DRAINAGE POSTERIOR SPINE N/A 04/01/2022    Procedure: Posterior cervical evacuation of postoperative collection and debridement with placement of drains C3-T1;  Surgeon: Rajeev Garcia MD;  Location: [facility] MAIN OR;  Service: Neurosurgery    IR BIOPSY KIDNEY RANDOM  05/26/2022    IR TUNNELED DIALYSIS CATHETER PLACEMENT  05/23/2022    IR TUNNELED DIALYSIS CATHETER REMOVAL  06/02/2022    CO ARTHRD ANT INTERBODY MIN DSC CRV BELOW C2 N/A 03/11/2022    Procedure: Anterior cervical discectomy and fixation fusion C5/6 and C6/7; Posterior cervical decompression and instrumented fusion C3-T1;  Surgeon: Rajeev Garcia MD;  Location: [facility] MA[facility] OR;  Service: Neurosurgery    CO ARTHRP KNE CONDYLE&PLATU MEDIAL&LAT COMPARTMENTS Right 4/8/2024    Procedure: ARTHROPLASTY KNEE TOTAL and all associated procedures;  Surgeon: Dot Galindo MD;  Location: [facility] Main OR;  Service: Orthopedics    RENAL BIOPSY  6/2022    SPINE SURGERY  03/11/2022    Cervical myelopathy/ cervical fusion      Social History     Substance and Sexual Activity   Alcohol Use Not Currently     Social History     Substance and Sexual Activity   Drug Use No     Social History     Tobacco Use   Smoking Status Never   Smokeless Tobacco Never     Family History:   Family History   Problem Relation Age of Onset    Lung cancer Mother     Hearing loss Father     Emphysema Sister     Heart attack Brother     Atrial fibrillation Brother     Clotting disorder Son      Meds/Allergies   all current active meds have been reviewed  lactated ringers, 125 mL/hr, Last Rate: Stopped (24 1419)      Allergies   Allergen Reactions    Morphine GI Intolerance    Vitamin K Other (See Comments)     On coumadin-patient sensitive to vitamin K amounts in meds etc.     Objective   Vitals: Blood pressure 112/79, pulse (!) 152, temperature (!) 97.2 °F (36.2 °C), resp. rate 20, SpO2 100%.,     There is no height or weight on file to calculate BMI.,     Systolic (24hrs), Av , Min:109 , Max:112     Diastolic (24hrs), Av, Min:71, Max:79      Intake/Output Summary (Last 24 hours) at 2024 1433  Last data filed at 2024 1419  Gross per 24 hour   Intake 1900 ml   Output --   Net 1900 ml     Weight (last 2 days)       None          Invasive Devices       Peripheral Intravenous Line  Duration             Peripheral IV 24 Right;Ventral (anterior) Hand <1 day              Airway  Duration             Non-Surgical Airway Oral pharyngeal airway 90 mm <1 day                  Physical Exam  Vitals reviewed.   Constitutional:       General: He is not in acute distress.     Appearance: Normal appearance.   HENT:      Head: Normocephalic.      Mouth/Throat:      Mouth: Mucous membranes are moist.   Cardiovascular:      Rate and Rhythm: Normal rate. Rhythm irregularly irregular.      Heart sounds: S1 normal and S2 normal.   Pulmonary:      Effort: Pulmonary effort is normal.      Breath sounds: Normal breath sounds. No wheezing or rales.    Musculoskeletal:      Right lower leg: No edema.      Left lower leg: No edema.   Skin:     General: Skin is warm.   Neurological:      Mental Status: He is alert and oriented to person, place, and time.   Psychiatric:         Mood and Affect: Mood normal.       Lab Results   Component Value Date    NTBNP 3,948 (H) 04/22/2022    NTBNP 121 03/04/2022     Results from last 7 days   Lab Units 09/23/24  1136   INR  1.05     Lipid Profile:   Lab Results   Component Value Date    CHOL 151 10/25/2017    CHOL 169 11/03/2016    CHOL 176 09/10/2015     Lab Results   Component Value Date    HDL 51 10/10/2023    HDL 49 10/10/2023    HDL 45 04/09/2023     Lab Results   Component Value Date    LDLCALC 92 10/10/2023    LDLCALC 90 10/10/2023    LDLCALC 128 (H) 04/09/2023     Lab Results   Component Value Date    TRIG 111 10/10/2023    TRIG 109 10/10/2023    TRIG 278 (H) 04/09/2023     Imaging: Reviewed radiology reports from this admission including: chest xray.  No results found.    Assessment  Principal Problem:    S/P total knee arthroplasty, left  Active Problems:    Primary hypertension    Paroxysmal A-fib (HCC)    Thank you for allowing us to participate in this patient's care. This pt will follow up with Dr Lock as scheduled once discharged.     Counseling / Coordination of Care  Total floor / unit time spent today 45 minutes.  Greater than 50% of total time was spent with the patient and / or family counseling and / or coordination of care.  A description of the counseling / coordination of care: Review of history, current assessment, development of a plan.      Code Status: Prior    ** Please Note: Dragon 360 Dictation voice to text software may have been used in the creation of this document. **

## 2024-09-23 NOTE — OP NOTE
Op Note  Tian Garcia  MRN: [id number]      Unit/Bed#: [facility] OR MAIN    PreOp Dx: left knee DJD      Postop Dx: left knee DJD      Procedure: left total knee arthroplasty      Surgeon: Dot Galindo MD      Assistants:Deepak Crane PA-C     No Qualified Resident Available for this Case.  The physician assistant was needed for all portions of this case including prepping and draping the patient as well as prepping and final implantation of the femoral, tibial, and patellar components.  He was also utilized for closure of the operative site.    Fluids:       EBL:       Drains: none      Specimens: No       Complications: No       Condition: stable transferred to postanesthesia care unit      Implants: Depuy Attune RP  Femoral, size: 9 cruciate retaining  Tibial tray: 9 fixed-bearing  Polyethylene: 10 medial stabilizing  Patellar button: 41      74 y.o.male, presents at this time, secondary to treatment of left knee DJD with varus deformity and flexion contracture of about 10 to 15 degrees, which has failed conservative treatment.    The patient was told of all the pros and cons of operative and nonoperative intervention. Some of the complications of operative intervention include infection, bleeding, scarring, nerve injury, vascular injury, fracture, continued pain, decreased range of motion, DVT, PE death, loss of limb, need for further surgery, not obtain an results. The patient wished. Patient did consent for operative intervention for this pathology.    Patient was brought to the operating room. Patient was anesthetized as anesthesia team. Patient was prepped and draped in normal sterile fashion. After this was done, we did conduct a time out to make sure correct. Patient was in the room, prepped marked and draped. Implants were in the room, Rep. For the implants were present, DVT prophylaxis and antibiotics were addressed.    Midline incision was made over the anterior knee after going through skin, fatty  tissue, fascia was identified. Flaps were created both medially and laterally. Medial parapatellar incision was made. Excision of Hoffa fat pad was conducted. At this time because this was a varus knee, we did release to expose the medial and lateral tibial plateaus. We're able to excise the fatty tissue from the anterior aspect of the distal femur. We were able to at this time, flex the knee with the patella everted. Intramedullary reamer was used. Intramedullary guide for the femur was then placed to determine how much of the distal femur to cut and also, valgus cut angle. Pins were then placed. Intramedullary guide was removed. Distal femoral cut was made.  Attention was now to the proximal tibia. Extramedullary guide was used. After making sure that we took the appropriate amount from the medial and lateral side with the aid of a measuring device, the jig was held in place with pins. Protection of the medial and lateral ligaments, as well as the popliteal region, was done. Proximal tibial cut was made. Extension gaps were evaluated.  Size of the femur was then determined with the aid of a guide. After this was done, we placed the appropriate sized block. These were held down with pins. Anterior and posterior cuts were made. Anterior, posterior, chamfer cuts were made. We did check our flexion gap and was noted to be appropriate.  This was a medial stabilized construct that was utilized therefore a notch cut was made at the trochlea of the femur.  Tibial tray size was determined. This was held down with 2 small screws. The reamer and the punch was then used with the appropriate guide to make sure that these were all done, the appropriate manner. Guide was removed. Trial femoral component was placed. Trial tibial polyethylene was placed. Patient was noted to be stable. On coronal and sagittal planes.   Patellar button size was determined after the articular surface of the patella was excised. The patella button  was placed on the medial aspect of the patella. Holes were drilled for our true patella button to be cemented on. We tracked the knee, and we noted that the patella was tracking appropriately over the trochlear of the trial implants. After this was done we did drill holes in our trial femoral component. We then removed. All trial components.  Copious irrigation was used at the operative site. We did place some bone within the intramedullary canal of the femur. High viscocity cement was used and all components were placed, including the femur, tibia, and patella button. A trial tibial Polyethylene was placed. After cement had dried, removed the trial polyethylene and removed any excess cement that was in the popliteal region. Copious irrigation was used. The tibial polyethylene was then placed. Patient was placed in the appropriate ranges of motion and patient's Knee was noted to be stable.  Tourniquet was discontinued. Hemostasis was obtained. #1 Vicryl sutures were used to reapproximate the parapatellar incision. 2-0 Vicryl sutures for the subcutaneous closure. This was reinforced with staples. Wounds were cleaned and dried. Acticoat, 4 x 4, ABDs and web roll was placed prior to Ace bandage be placed from the foot. All the way to the mid thigh region.  Patient was awakened as anesthesia team noted to be a stable condition and transferred to postanesthesia care unit

## 2024-09-23 NOTE — OCCUPATIONAL THERAPY NOTE
Occupational Therapy Evaluation     Patient Name: Tian Garcia  Today's Date: 9/23/2024  Problem List  Principal Problem:    S/P total knee arthroplasty, left  Active Problems:    Primary hypertension    Paroxysmal A-fib ([facility])    Past Medical History  Past Medical History:   Diagnosis Date    Acute venous embolism and thrombosis of deep vessels of proximal lower extremity ([facility])     Last assessed: 5/18/15    JANEL (acute kidney injury) ([facility])     Arthritis     Cellulitis     LE    Chronic kidney disease 03/2020    Acute Kidney Injury    CPAP (continuous positive airway pressure) dependence     Factor V Leiden ([facility])     Forgetfulness     Gross hematuria 05/17/2022    Headache(784.0) 03/2022    Never till cervical  spine surgery    Heart murmur 03/2020    Told when in hospital    History of transfusion     [place] (hard of hearing)     Hypoalbuminemia 05/11/2022    Other acute osteomyelitis, other site ([facility]) 01/03/2023    Paroxysmal atrial fibrillation ([facility])     Last assessed: 11/2/15    Sleep apnea      Past Surgical History  Past Surgical History:   Procedure Laterality Date    BACK SURGERY      Lower    CARDIAC CATHETERIZATION N/A 04/09/2023    Procedure: Cardiac pci;  Surgeon: Bird Cast MD;  [facility];  Service: Cardiology    CARDIAC CATHETERIZATION N/A 04/09/2023    Procedure: Cardiac Coronary Angiogram;  Surgeon: Bird Cast MD;  [facility];  Service: Cardiology    CATARACT EXTRACTION      COLON SURGERY      COLONOSCOPY      INCISION AND DRAINAGE POSTERIOR SPINE N/A 04/01/2022    Procedure: Posterior cervical evacuation of postoperative collection and debridement with placement of drains C3-T1;  Surgeon: Rajeev Garcia MD;  Location: [facility] MA[facility];  Service: Neurosurgery    IR BIOPSY KIDNEY RANDOM  05/26/2022    IR TUNNELED DIALYSIS CATHETER PLACEMENT  05/23/2022    IR TUNNELED DIALYSIS CATHETER REMOVAL  06/02/2022    MN ARTHRD ANT INTERBODY MIN DSC CRV BELOW  C2 N/A 03/11/2022    Procedure: Anterior cervical discectomy and fixation fusion C5/6 and C6/7; Posterior cervical decompression and instrumented fusion C3-T1;  Surgeon: Rajeev Garcia MD;  Location: [facility];  Service: Neurosurgery    OH ARTHRP KNE CONDYLE&PLATU MEDIAL&LAT COMPARTMENTS Right 4/8/2024    Procedure: ARTHROPLASTY KNEE TOTAL and all associated procedures;  Surgeon: Dot Galindo MD;  Location: [facility] Main OR;  Service: Orthopedics    RENAL BIOPSY  6/2022    SPINE SURGERY  03/11/2022    Cervical myelopathy/ cervical fusion        09/23/24 1700   OT Last Visit   OT Visit Date 09/23/24   Note Type   Note type Evaluation   Pain Assessment   Pain Assessment Tool 0-10   Pain Score No Pain   Restrictions/Precautions   Weight Bearing Precautions Per Order Yes   LLE Weight Bearing Per Order WBAT  (s/p L TKA)   Other Precautions Chair Alarm;Bed Alarm;WBS;Fall Risk;Pain  (HR 90-100s throughout)   Home Living   Type of Home House   Home Layout Multi-level;Bed/bath upstairs;Stairs to enter with rails  (4 MICHELLE; full bathroom on first floor, bedroom on 2nd)   Bathroom Shower/Tub Walk-in shower   Bathroom Toilet Standard   Bathroom Equipment Shower chair;Grab bars in shower   Bathroom Accessibility Accessible  (full bathroom located on the first floor)   Home Equipment Walker;Cane;Grab bars   Additional Comments Ambulates w/ SPC at baseline   Prior Function   Level of [place] Independent with ADLs;Independent with functional mobility;Independent with IADLS   Lives With Spouse   Receives Help From Family;Friend(s);Outpatient therapy  (OPPT 2x/week, has been put on hold x1 month)   IADLs Independent with driving;Independent with meal prep;Independent with medication management   Falls in the last 6 months 0  (pt denies)   Vocational Retired   Lifestyle   Autonomy Pt lives w/ spouse in a multi-level home. Independent with ADLs/IADLs and functional mobility, no use of AD. (-) falls and (+) driving  "  Reciprocal Relationships Supportive family   Service to Others Retired   General   Additional Pertinent History Pt presents for elective L TKA. During sx he was noted to be in rapid atrial fibrillation with heart rates up to the 150s with mild hypotension   Family/Caregiver Present Yes  (supportive spouse at bedside)   Subjective   Subjective \"I feel normal\"   ADL   Eating Assistance 7  Independent   Grooming Assistance 7  Independent   UB Bathing Assistance 7  Independent   LB Bathing Assistance 5  Supervision/Setup   UB Dressing Assistance 7  Independent   LB Dressing Assistance 5  Supervision/Setup   Toileting Assistance  5  Supervision/Setup   Additional Comments The above levels of assistance are anticipated based on functional performance deficits with use of clinical judgement.   Bed Mobility   Supine to Sit 5  Supervision   Additional items HOB elevated;Bedrails;Increased time required;Verbal cues   Additional Comments Denies lighteadedness/dizziness w/ postural changes   Transfers   Sit to Stand 5  Supervision   Additional items Increased time required;Verbal cues   Stand to Sit 5  Supervision   Additional items Increased time required;Verbal cues   Additional Comments VC for optimal hand placement and body mechanics for safe transfers   Functional Mobility   Functional Mobility 5  Supervision   Additional Comments for functional household distance w/ use of RW.   Additional items Rolling walker   Balance   Static Sitting Good   Dynamic Sitting Fair +   Static Standing Fair -   Dynamic Standing Poor +   Activity Tolerance   Activity Tolerance Patient tolerated treatment well   Medical Staff Made Aware Spoke with PT   Nurse Made Aware [place] with MIREILLE Freitas pre/post   RUE Assessment   RUE Assessment WFL   LUE Assessment   LUE Assessment WFL   Hand Function   Gross Motor Coordination Functional   Fine Motor Coordination Functional   Vision-Basic Assessment   Current Vision Does not wear glasses   Cognition "   Overall Cognitive Status WFL   Arousal/Participation Alert;Responsive;Cooperative   Attention Within functional limits   Orientation Level Oriented X4   Memory Within functional limits   Following Commands Follows all commands and directions without difficulty   Comments Pt ID via wristband, name and .   Assessment   Limitation Decreased ADL status;Decreased endurance;Decreased self-care trans;Decreased high-level ADLs   Prognosis Good   Assessment Patient is a 74 y.o. male seen for OT evaluation following admission on 2024  s/p S/P total knee arthroplasty, left. Please see above for comprehensive list of comorbidities and significant PMHx impacting functional performance. Upon initial evaluation, pt appears to be performing below baseline functional status. Pt requires independent for UB ADLs, supervision for LB ADLs, supervision for bed mobility, supervision for transfers and supervision for functional mobility household distance with RW. The AM-PAC & Barthel Index outcome tools were used to assist in determining pt safety w/self care /mobility and appropriate d/c recommendations, see above for score. Occupational performance is affected by the following deficits: endurance , decreased balance , decreased standing tolerance for self care tasks , decreased dynamic balance impacting functional reach, decreased functional reach , and decreased activity tolerance . Personal/Environmental factors impacting D/C include: steps to enter/navigate the home, FOS to second floor, Assistance needed for ADL/IADLs, Assistance needed for ADLs and functional mobility, and High fall risk . Supporting factors include: able to maintain FFSU, support system available, and attitude towards recovery . Patient would benefit from OT services within the acute care setting to maximize level of functional independence in the following areas fall prevention , self-care transfers, bed mobility , functional mobility, and ADLs.  From  OT standpoint, recommendation at time of D/C would be Level IIII (No post-acute rehabilitation needs) .   Goals   Patient Goals to go home   LTG Time Frame 10-14   Long Term Goal See below   Plan   Treatment Interventions ADL retraining;Functional transfer training;Endurance training;Patient/family training;Equipment evaluation/education;Compensatory technique education;Activityengagement   Goal Expiration Date 10/03/24   OT Treatment Day 0   OT Frequency 3-5x/wk   Discharge Recommendation   Rehab Resource Intensity Level, OT No post-acute rehabilitation needs   Additional Comments  The patient's raw score on the AM-PAC Daily Activity Inpatient Short Form is 21. A raw score of greater than or equal to 19 suggests the patient may benefit from discharge to home. Please refer to the recommendation of the Occupational Therapist for safe discharge planning.   AM-PAC Daily Activity Inpatient   Lower Body Dressing 3   Bathing 3   Toileting 3   Upper Body Dressing 4   Grooming 4   Eating 4   Daily Activity Raw Score 21   Daily Activity Standardized Score (Calc for Raw Score >=11) 44.27   AM-PAC Applied Cognition Inpatient   Following a Speech/Presentation 4   Understanding Ordinary Conversation 4   Taking Medications 4   Remembering Where Things Are Placed or Put Away 4   Remembering List of 4-5 Errands 4   Taking Care of Complicated Tasks 4   Applied Cognition Raw Score 24   Applied Cognition Standardized Score 62.21   Barthel Index   Feeding 10   Bathing 0   Grooming Score 5   Dressing Score 5   Bladder Score 10   Bowels Score 10   Toilet Use Score 5   Transfers (Bed/Chair) Score 10   Mobility (Level Surface) Score 0   Stairs Score 0   Barthel Index Score 55   End of Consult   Education Provided Family or social support of family present for education by provider;Yes   Patient Position at End of Consult Bedside chair;Bed/Chair alarm activated;All needs within reach   Nurse Communication Nurse aware of consult      GOALS:  -Patient will be Mod I with LB dressing with use of AE and AD as needed in order to increase (I) with ADLs     -Patient will be Mod I with LB bathing with use of AE and AD as needed in order to increase (I) with ADLs     -Patient will complete toileting w/ Mod I w/ G hygiene/thoroughness in order to reduce caregiver burden     -Patient will demonstrate Mod I with bed mobility for ability to manage own comfort and initiate OOB tasks.      -Patient will perform functional transfers with Mod I to/from all surfaces using DME as needed in order to increase (I) with functional tasks     -Patient will be Mod I with functional mobility to/from bathroom for increased independence with toileting tasks     -Patient will demonstrate standing for 8 min in order to increase active participation in functional activities    Pt benefited from co-session of skilled OT and PT therapists in order to most appropriately address functional deficits d/t decreased activity tolerance.  OT/PT objectives were addressed separately; please see PT note for specific goal areas targeted.    Frances Atkins MS OTR/L   [place] Licensure# [id number]

## 2024-09-23 NOTE — ANESTHESIA POSTPROCEDURE EVALUATION
Post-Op Assessment Note    CV Status:  Stable  Pain Score: 0    Pain management: adequate       Mental Status:  Awake and alert   Hydration Status:  Stable   PONV Controlled:  None   Airway Patency:  Patent and adequate     Post Op Vitals Reviewed: Yes    No anethesia notable event occurred.    Staff: CRNA, Anesthesiologist               /79 (09/23/24 1427)    Temp (!) 97.2 °F (36.2 °C) (09/23/24 1427)    Pulse (!) 152 (09/23/24 1427)   Resp 20 (09/23/24 1427)    SpO2 100 % (09/23/24 1427)

## 2024-09-24 VITALS
OXYGEN SATURATION: 97 % | SYSTOLIC BLOOD PRESSURE: 116 MMHG | TEMPERATURE: 98.7 F | HEART RATE: 91 BPM | RESPIRATION RATE: 16 BRPM | DIASTOLIC BLOOD PRESSURE: 71 MMHG

## 2024-09-24 LAB
ANION GAP SERPL CALCULATED.3IONS-SCNC: 7 MMOL/L (ref 4–13)
BUN SERPL-MCNC: 36 MG/DL (ref 5–25)
CALCIUM SERPL-MCNC: 8.9 MG/DL (ref 8.4–10.2)
CHLORIDE SERPL-SCNC: 105 MMOL/L (ref 96–108)
CO2 SERPL-SCNC: 25 MMOL/L (ref 21–32)
CREAT SERPL-MCNC: 1.98 MG/DL (ref 0.6–1.3)
ERYTHROCYTE [DISTWIDTH] IN BLOOD BY AUTOMATED COUNT: 15.3 % (ref 11.6–15.1)
GFR SERPL CREATININE-BSD FRML MDRD: 32 ML/MIN/1.73SQ M
GLUCOSE P FAST SERPL-MCNC: 121 MG/DL (ref 65–99)
GLUCOSE SERPL-MCNC: 121 MG/DL (ref 65–140)
HCT VFR BLD AUTO: 33.8 % (ref 36.5–49.3)
HGB BLD-MCNC: 11 G/DL (ref 12–17)
MCH RBC QN AUTO: 29.6 PG (ref 26.8–34.3)
MCHC RBC AUTO-ENTMCNC: 32.5 G/DL (ref 31.4–37.4)
MCV RBC AUTO: 91 FL (ref 82–98)
PLATELET # BLD AUTO: 170 THOUSANDS/UL (ref 149–390)
PMV BLD AUTO: 10.4 FL (ref 8.9–12.7)
POTASSIUM SERPL-SCNC: 4.4 MMOL/L (ref 3.5–5.3)
RBC # BLD AUTO: 3.71 MILLION/UL (ref 3.88–5.62)
SODIUM SERPL-SCNC: 137 MMOL/L (ref 135–147)
WBC # BLD AUTO: 11.58 THOUSAND/UL (ref 4.31–10.16)

## 2024-09-24 PROCEDURE — 80048 BASIC METABOLIC PNL TOTAL CA: CPT | Performed by: PHYSICIAN ASSISTANT

## 2024-09-24 PROCEDURE — 99024 POSTOP FOLLOW-UP VISIT: CPT | Performed by: PHYSICIAN ASSISTANT

## 2024-09-24 PROCEDURE — 97535 SELF CARE MNGMENT TRAINING: CPT

## 2024-09-24 PROCEDURE — 97116 GAIT TRAINING THERAPY: CPT

## 2024-09-24 PROCEDURE — 85027 COMPLETE CBC AUTOMATED: CPT | Performed by: PHYSICIAN ASSISTANT

## 2024-09-24 PROCEDURE — NC001 PR NO CHARGE: Performed by: ANESTHESIOLOGY

## 2024-09-24 PROCEDURE — 99232 SBSQ HOSP IP/OBS MODERATE 35: CPT | Performed by: ANESTHESIOLOGY

## 2024-09-24 PROCEDURE — 97530 THERAPEUTIC ACTIVITIES: CPT

## 2024-09-24 PROCEDURE — 99214 OFFICE O/P EST MOD 30 MIN: CPT | Performed by: INTERNAL MEDICINE

## 2024-09-24 PROCEDURE — 97110 THERAPEUTIC EXERCISES: CPT

## 2024-09-24 RX ORDER — METHOCARBAMOL 500 MG/1
500 TABLET, FILM COATED ORAL EVERY 6 HOURS SCHEDULED
Status: DISCONTINUED | OUTPATIENT
Start: 2024-09-24 | End: 2024-09-24 | Stop reason: HOSPADM

## 2024-09-24 RX ORDER — METOPROLOL SUCCINATE 25 MG/1
25 TABLET, EXTENDED RELEASE ORAL 2 TIMES DAILY
Qty: 90 TABLET | Refills: 0 | Status: SHIPPED | OUTPATIENT
Start: 2024-09-24

## 2024-09-24 RX ORDER — OXYCODONE HYDROCHLORIDE 5 MG/1
5 TABLET ORAL EVERY 4 HOURS PRN
Status: DISCONTINUED | OUTPATIENT
Start: 2024-09-24 | End: 2024-09-24 | Stop reason: HOSPADM

## 2024-09-24 RX ADMIN — EZETIMIBE 10 MG: 10 TABLET ORAL at 09:12

## 2024-09-24 RX ADMIN — CEFAZOLIN SODIUM 1000 MG: 1 SOLUTION INTRAVENOUS at 05:33

## 2024-09-24 RX ADMIN — OXYCODONE HYDROCHLORIDE AND ACETAMINOPHEN 500 MG: 500 TABLET ORAL at 09:13

## 2024-09-24 RX ADMIN — ENOXAPARIN SODIUM 40 MG: 40 INJECTION SUBCUTANEOUS at 02:33

## 2024-09-24 RX ADMIN — SODIUM CHLORIDE, SODIUM LACTATE, POTASSIUM CHLORIDE, AND CALCIUM CHLORIDE 75 ML/HR: .6; .31; .03; .02 INJECTION, SOLUTION INTRAVENOUS at 05:33

## 2024-09-24 RX ADMIN — ASPIRIN 81 MG: 81 TABLET, COATED ORAL at 09:12

## 2024-09-24 RX ADMIN — METOPROLOL SUCCINATE 25 MG: 25 TABLET, EXTENDED RELEASE ORAL at 09:12

## 2024-09-24 RX ADMIN — DOCUSATE SODIUM 100 MG: 100 CAPSULE, LIQUID FILLED ORAL at 09:12

## 2024-09-24 RX ADMIN — METHOCARBAMOL TABLETS 750 MG: 750 TABLET, COATED ORAL at 05:33

## 2024-09-24 RX ADMIN — METHOCARBAMOL 500 MG: 500 TABLET ORAL at 12:12

## 2024-09-24 RX ADMIN — DULOXETINE HYDROCHLORIDE 60 MG: 60 CAPSULE, DELAYED RELEASE ORAL at 09:13

## 2024-09-24 RX ADMIN — SENNOSIDES 8.6 MG: 8.6 TABLET, FILM COATED ORAL at 09:12

## 2024-09-24 RX ADMIN — FOLIC ACID 1 MG: 1 TABLET ORAL at 09:12

## 2024-09-24 RX ADMIN — POTASSIUM CHLORIDE 20 MEQ: 1500 TABLET, EXTENDED RELEASE ORAL at 09:12

## 2024-09-24 RX ADMIN — TORSEMIDE 10 MG: 10 TABLET ORAL at 09:12

## 2024-09-24 RX ADMIN — Medication 2.5 MG: at 12:12

## 2024-09-24 NOTE — PROGRESS NOTES
Progress Note - Cardiology   Name: Tian Garcia 74 y.o. male I MRN: [id number]  Unit/Bed#: S -01 I Date of Admission: 2024   Date of Service: 2024 I Hospital Day: 0     Assessment & Plan  S/P total knee arthroplasty, left    Primary hypertension    Paroxysmal A-fib ([facility]CC)  - rate controlled.      Plan:  Continue metoprolol succinate 25mg BID on discharge.  Continue remainder of medications.  Stable for discharge from cardiac standpoint.         Subjective:    No significant events overnight.      Review of Systems   Cardiovascular:  Negative for chest pain, leg swelling and palpitations.   Respiratory:  Negative for shortness of breath.        Objective:   Vitals: Blood pressure 116/71, pulse 91, temperature 98.7 °F (37.1 °C), resp. rate 16, SpO2 97%., There is no height or weight on file to calculate BMI.,   Orthostatic Blood Pressures      Flowsheet Row Most Recent Value   Blood Pressure 116/71 filed at 2024 0939           Systolic (24hrs), Av , Min:104 , Max:142     Diastolic (24hrs), Av, Min:65, Max:94      Intake/Output Summary (Last 24 hours) at 2024 1015  Last data filed at 2024 0918  Gross per 24 hour   Intake 3501.25 ml   Output 1700 ml   Net 1801.25 ml     Weight (last 2 days)       None                  Physical Exam  Cardiovascular:      Rate and Rhythm: Normal rate. Rhythm irregular.      Heart sounds: Normal heart sounds. No murmur heard.     No friction rub. No gallop.   Pulmonary:      Breath sounds: Normal breath sounds. No wheezing or rales.         Laboratory Results:        CBC with diff:   Results from last 7 days   Lab Units 24  0438   WBC Thousand/uL 11.58*   HEMOGLOBIN g/dL 11.0*   HEMATOCRIT % 33.8*   MCV fL 91   PLATELETS Thousands/uL 170   RBC Million/uL 3.71*   MCH pg 29.6   MCHC g/dL 32.5   RDW % 15.3*   MPV fL 10.4         CMP:  Results from last 7 days   Lab Units 24  0438   POTASSIUM mmol/L 4.4   CHLORIDE mmol/L 105   CO2 mmol/L 25    BUN mg/dL 36*   CREATININE mg/dL 1.98*   CALCIUM mg/dL 8.9   EGFR ml/min/1.73sq m 32         BMP:  Results from last 7 days   Lab Units 09/24/24  0438   POTASSIUM mmol/L 4.4   CHLORIDE mmol/L 105   CO2 mmol/L 25   BUN mg/dL 36*   CREATININE mg/dL 1.98*   CALCIUM mg/dL 8.9           TSH:       Lipid Profile:             Cardiac testing:   No results found for this or any previous visit.    No results found for this or any previous visit.    No results found for this or any previous visit.    No results found for this or any previous visit.      Meds/Allergies   Current Facility-Administered Medications   Medication Dose Route Frequency Provider Last Rate    acetaminophen  650 mg Oral Q6H PRN Deepak Crane PA-C      ascorbic acid  500 mg Oral BID Deepak Crane PA-C      aspirin  81 mg Oral Daily Deepak Crane PA-C      atorvastatin  80 mg Oral QPM Deepak Crane PA-C      calcium carbonate  1,000 mg Oral Daily PRN Deepak Crane PA-C      docusate sodium  100 mg Oral BID Deepak Crane PA-C      DULoxetine  60 mg Oral Daily Deepak Crane PA-C      enoxaparin  40 mg Subcutaneous Daily Deepak Crane PA-C      ezetimibe  10 mg Oral Daily Deepak Crane PA-C      ferrous sulfate  325 mg Oral BID With Meals Deepak Crane PA-C      folic acid  1 mg Oral Daily Deepak Crane PA-C      gabapentin  300 mg Oral HS Deepak Crane PA-C      lactated ringers  1,000 mL Intravenous Once PRN Deepak Crane PA-C      And    lactated ringers  1,000 mL Intravenous Once PRN Deepak Crane PA-C      lactated ringers  125 mL/hr Intravenous Continuous Deepak Crane PA-C Stopped (09/23/24 1419)    lactated ringers  75 mL/hr Intravenous Continuous Deepak Crane PA-C 75 mL/hr (09/24/24 0533)    methocarbamol  500 mg Oral Q6H VARGAS Oliva PA-C      metoprolol succinate  25 mg Oral BID  DEEPTHI Mata      naloxone  0.04 mg Intravenous Q1MIN PRN Christopher Oliva PA-C      ondansetron  4 mg Intravenous Q6H PRN Deepak Crane PA-C      oxyCODONE  5 mg Oral Q4H PRN Christopher Oliva PA-C      oxyCODONE  2.5 mg Oral Q4H PRN Christopher Oliva PA-C      potassium chloride  20 mEq Oral Daily Deepak Crane PA-C      senna  1 tablet Oral Daily Deepak Crane PA-C      sodium chloride  1,000 mL Intravenous Once PRN Deepak Crane PA-C      And    sodium chloride  1,000 mL Intravenous Once PRN Deepak Crane PA-C      tamsulosin  0.4 mg Oral Daily With Dinner Depeak Crane PA-C      torsemide  10 mg Oral Daily Deepak Crane PA-C      warfarin  2.5 mg Oral Daily (warfarin) Deepak Crane PA-C      zolpidem  10 mg Oral HS PRN Deepak Crane PA-C       lactated ringers, 125 mL/hr, Last Rate: Stopped (09/23/24 1419)  lactated ringers, 75 mL/hr, Last Rate: 75 mL/hr (09/24/24 0533)        Medications Prior to Admission:     ascorbic acid (VITAMIN C) 500 MG tablet    Aspirin 81 MG CAPS    atorvastatin (LIPITOR) 80 mg tablet    DULoxetine (CYMBALTA) 60 mg delayed release capsule    ezetimibe (ZETIA) 10 mg tablet    folic acid (FOLVITE) 1 mg tablet    gabapentin (NEURONTIN) 300 mg capsule    lisinopril (ZESTRIL) 5 mg tablet    metoprolol succinate (TOPROL-XL) 25 mg 24 hr tablet    nitroglycerin (NITROSTAT) 0.4 mg SL tablet    potassium chloride (Klor-Con M20) 20 mEq tablet    tamsulosin (FLOMAX) 0.4 mg    tirzepatide (Mounjaro) 2.5 MG/0.5ML    torsemide (DEMADEX) 20 mg tablet    traMADol (Ultram) 50 mg tablet    warfarin (COUMADIN) 5 mg tablet    glucose blood (Accu-Chek Guide) test strip    mometasone (ELOCON) 0.1 % cream    zolpidem (AMBIEN) 10 mg tablet    Assessment:  Principal Problem:    S/P total knee arthroplasty, left  Active Problems:    Primary hypertension    Paroxysmal A-fib (HCC)

## 2024-09-24 NOTE — ASSESSMENT & PLAN NOTE
S/p left total knee arthroplasty with Dr. Galindo 9/23/2024 (POD 1)  Weight bearing as tolerated to the left lower extremity.   Cleared for discharge from PT/OT.   Pain currently well controlled.   Tolerating oral intake.   Voiding without issue.   DVT ppx: coumadin.   Dispo: stable for discharge.   Follow up with Dr. Galindo upon discharge.

## 2024-09-24 NOTE — PROGRESS NOTES
Peripheral Nerve Block Follow-up Note - Acute Pain Service    Tian Garcia 74 y.o. male MRN: [id number]  Unit/Bed#: S -01 Encounter: [id number]      Assessment:   Principal Problem:    S/P total knee arthroplasty, left  Active Problems:    Primary hypertension    Paroxysmal A-fib (HCC)    Tian Garcia is a 74 y.o. year old male status post left TKA, POD 1.    Plan:   - Left adductor block is functioning appropriately with expected sensory deficits  - Recommend  Decrease oxycodone to 2.5 mg p.o. every 4 hours as needed moderate pain or 5 mg p.o. every 4 hours as needed severe pain based on patient age.  Discontinue IV Dilaudid.    - Multimodal analgesia with:  - Tylenol 650 mg PO q6hrs standing  Decrease Robaxin to 500 mg p.o. every 6 hours based on patient age  Cymbalta 60 mg p.o. daily.  Gabapentin 300 mg p.o. at bedtime.      APS will sign off at this time. Thank you for the consult. All opioids and other analgesics to be written at discretion of primary team. Please contact Acute Pain Service - [facility] via [employer] from [phone] with additional questions or concerns. See [employer] or [employer] for additional contacts and after hours information.    Pain History  Current pain location(s): No pain  Pain Scale:   No pain  Quality: No pain  24 hour history: Patient is status post left TKA, POD 1.  Had preoperative placement of left adductor canal block with Exparel.  Currently has no pain despite having been ambulating with physical therapy recently.    Opioid requirement previous 24 hours: None    Meds/Allergies   all current active meds have been reviewed, current meds:   Current Facility-Administered Medications:     acetaminophen (TYLENOL) tablet 650 mg, Q6H PRN    ascorbic acid (VITAMIN C) tablet 500 mg, BID    aspirin (ECOTRIN LOW STRENGTH) EC tablet 81 mg, Daily    atorvastatin (LIPITOR) tablet 80 mg, QPM    calcium carbonate (TUMS) chewable tablet 1,000 mg, Daily PRN    docusate sodium (COLACE) capsule  100 mg, BID    DULoxetine (CYMBALTA) delayed release capsule 60 mg, Daily    enoxaparin (LOVENOX) subcutaneous injection 40 mg, Daily    ezetimibe (ZETIA) tablet 10 mg, Daily    ferrous sulfate tablet 325 mg, BID With Meals    folic acid (FOLVITE) tablet 1 mg, Daily    gabapentin (NEURONTIN) capsule 300 mg, HS    HYDROmorphone (DILAUDID) injection 0.5 mg, Q2H PRN    lactated ringers bolus 1,000 mL, Once PRN **AND** lactated ringers bolus 1,000 mL, Once PRN    lactated ringers infusion, Continuous, Last Rate: Stopped (09/23/24 1419)    lactated ringers infusion, Continuous, Last Rate: 75 mL/hr (09/24/24 0533)    methocarbamol (ROBAXIN) tablet 750 mg, Q6H VARGAS    metoprolol succinate (TOPROL-XL) 24 hr tablet 25 mg, BID    naloxone (NARCAN) 0.04 mg/mL syringe 0.04 mg, Q1MIN PRN    ondansetron (ZOFRAN) injection 4 mg, Q6H PRN    oxyCODONE (ROXICODONE) immediate release tablet 10 mg, Q4H PRN    oxyCODONE (ROXICODONE) IR tablet 5 mg, Q4H PRN    potassium chloride (Klor-Con M20) CR tablet 20 mEq, Daily    senna (SENOKOT) tablet 8.6 mg, Daily    sodium chloride 0.9 % bolus 1,000 mL, Once PRN **AND** sodium chloride 0.9 % bolus 1,000 mL, Once PRN    tamsulosin (FLOMAX) capsule 0.4 mg, Daily With Dinner    torsemide (DEMADEX) tablet 10 mg, Daily    warfarin (COUMADIN) tablet 2.5 mg, Daily (warfarin)    zolpidem (AMBIEN) tablet 10 mg, HS PRN, and PTA meds:   Prior to Admission Medications   Prescriptions Last Dose Informant Patient Reported? Taking?   Aspirin 81 MG CAPS 9/23/2024 Self Yes Yes   Sig: Take by mouth   DULoxetine (CYMBALTA) 60 mg delayed release capsule 9/22/2024 Self No Yes   Sig: TAKE 1 CAPSULE BY MOUTH EVERY DAY   ascorbic acid (VITAMIN C) 500 MG tablet Past Week  No Yes   Sig: Take 1 tablet (500 mg total) by mouth 2 (two) times a day   atorvastatin (LIPITOR) 80 mg tablet 9/22/2024 Self No Yes   Sig: TAKE 1 TABLET BY MOUTH EVERY EVENING   ezetimibe (ZETIA) 10 mg tablet 9/22/2024  No Yes   Sig: TAKE 1 TABLET BY  MOUTH EVERY DAY   ferrous sulfate 324 (65 Fe) mg Unknown  No No   Sig: TAKE 1 TABLET (324 MG TOTAL) BY MOUTH DAILY BEFORE BREAKFAST   folic acid (FOLVITE) 1 mg tablet Past Week  No Yes   Sig: Take 1 tablet (1 mg total) by mouth daily   gabapentin (NEURONTIN) 300 mg capsule 9/22/2024 Self No Yes   Sig: TAKE 1 CAPSULE BY MOUTH DAILY AT BEDTIME   glucose blood (Accu-Chek Guide) test strip  Self No No   Sig: Use one new strip daily dx r73.01   lisinopril (ZESTRIL) 5 mg tablet 9/22/2024  No Yes   Sig: Take 1 tablet (5 mg total) by mouth daily   metoprolol succinate (TOPROL-XL) 25 mg 24 hr tablet 9/23/2024  No Yes   Sig: Take 1 tablet (25 mg total) by mouth daily   mometasone (ELOCON) 0.1 % cream  Self No No   Sig: APPLY TO AFFECTED AREA TOPICALLY EVERY DAY   Patient not taking: Reported on 9/10/2024   nitroglycerin (NITROSTAT) 0.4 mg SL tablet  Self No Yes   Sig: Place 1 tablet (0.4 mg total) under the tongue every 5 (five) minutes as needed for chest pain   potassium chloride (Klor-Con M20) 20 mEq tablet 9/23/2024  No Yes   Sig: Take 1 tablet (20 mEq total) by mouth daily   tamsulosin (FLOMAX) 0.4 mg 9/22/2024  No Yes   Sig: Take 1 capsule (0.4 mg total) by mouth daily with dinner   tirzepatide (Mounjaro) 2.5 MG/0.5ML Past Month Self No Yes   Sig: INJECT 0.5 ML (2.5 MG TOTAL) UNDER THE SKIN EVERY 7 DAYS   torsemide (DEMADEX) 20 mg tablet 9/22/2024 Self No Yes   Sig: TAKE 1 AND 1/2 TABLETS ALTERNATING WITH 1 TABLET DAILY AS DIRECTED   Patient taking differently: Take 10 mg by mouth PT taking 1/2 tablet daily (10mg) usually   traMADol (Ultram) 50 mg tablet 9/22/2024 Self No Yes   Sig: Take 1 tablet (50 mg total) by mouth every 8 (eight) hours as needed for moderate pain   warfarin (COUMADIN) 5 mg tablet 9/16/2024 Self No Yes   Sig: TAKE ONE-HALF TABLET BY MOUTH  DAILY , EXCEPT TAKE 1 TABLET BY  MOUTH ON WEDNESDAY AND SATURDAY   Patient taking differently: TAKE ONE-HALF TABLET BY MOUTH  DAILY , EXCEPT TAKE 1 TABLET  Tuesday  Managed by Internal Medicine.   zolpidem (AMBIEN) 10 mg tablet Unknown Self No No   Sig: Take 1 tablet (10 mg total) by mouth daily at bedtime as needed for sleep      Facility-Administered Medications: None       Allergies   Allergen Reactions    Morphine GI Intolerance    Vitamin K Other (See Comments)     On coumadin-patient sensitive to vitamin K amounts in meds etc.       Objective     Temp:  [96.8 °F (36 °C)-98.1 °F (36.7 °C)] 97.7 °F (36.5 °C)  HR:  [] 89  Resp:  [16-20] 18  BP: (104-142)/(65-94) 110/72    Physical Exam  Vitals and nursing note reviewed.   Constitutional:       General: He is awake. He is not in acute distress.     Appearance: He is not ill-appearing, toxic-appearing or diaphoretic.   Skin:     General: Skin is warm and dry.   Neurological:      Mental Status: He is alert and oriented to person, place, and time.      GCS: GCS eye subscore is 4. GCS verbal subscore is 5. GCS motor subscore is 6.   Psychiatric:         Attention and Perception: Attention normal.         Speech: Speech normal.         Behavior: Behavior normal. Behavior is cooperative.           Lab Results:   Results from last 7 days   Lab Units 09/24/24  0438   WBC Thousand/uL 11.58*   HEMOGLOBIN g/dL 11.0*   HEMATOCRIT % 33.8*   PLATELETS Thousands/uL 170      Results from last 7 days   Lab Units 09/24/24  0438   POTASSIUM mmol/L 4.4   CHLORIDE mmol/L 105   CO2 mmol/L 25   BUN mg/dL 36*   CREATININE mg/dL 1.98*   CALCIUM mg/dL 8.9       Imaging Studies: No pertinent imaging studies reviewed.  EKG, Pathology, and Other Studies: No pertinent imaging studies reviewed.    Counseling / Coordination of Care  Total floor / unit time spent today 30 minutes. Greater than 50% of total time was spent with the patient and / or family counseling and / or coordination of care. A description of the counseling / coordination of care: Patient interview, physical examination, review of medical records, review of imaging and  laboratory data, development of pain management plan, discussion of pain management plan with patient and primary service.    Please note that the APS provides consultative services regarding pain management only.  With the exception of ketamine, peripheral nerve catheters, and epidural infusions (and except when indicated), final decisions regarding starting or changing doses of analgesic medications are at the discretion of the consulting service.  Off hours consultation and/or medication management is generally not available.    Christopher Cantu PA-C  Acute Pain Service

## 2024-09-24 NOTE — PLAN OF CARE
Problem: OCCUPATIONAL THERAPY ADULT  Goal: Performs self-care activities at highest level of function for planned discharge setting.  See evaluation for individualized goals.  Description: Treatment Interventions: ADL retraining, Functional transfer training, Endurance training, Patient/family training, Equipment evaluation/education, Compensatory technique education, Activityengagement          See flowsheet documentation for full assessment, interventions and recommendations.   Outcome: Adequate for Discharge  Note: Limitation: Decreased ADL status, Decreased endurance, Decreased self-care trans, Decreased high-level ADLs  Prognosis: Good  Assessment: Patient seen for OT treatment on 9/24/2024 s/p admission for S/P total knee arthroplasty, left Patient agreeable to OT session. Patient participated in fall prevention , self-care transfers, functional mobility, and ADLs with intervention focus on optimizing independence in functional mobility, self-care transfers and ADL tasks. Tian Garcia demonstrates adequate functional independence and safety for POD#1 D/C.   Personal factors continuing to impact D/C include: steps to enter/navigate the home and FOS to second floor. Supporting factors include: able to maintain FFSU, support system available, and attitude towards recovery. No further acute OT needs identified at this time. Recommend continued active ADL participation and mobilization with hospital staff while in the hospital to increase pt’s endurance and strength upon D/C. From OT standpoint, recommend D/C Level IIII (No post-acute rehabilitation needs)  with family support when medically cleared. D/C pt from OT caseload at this time.     Rehab Resource Intensity Level, OT: No post-acute rehabilitation needs     Frances Atkins MS OTR/L   [place] Licensure# [id number]

## 2024-09-24 NOTE — PLAN OF CARE
Problem: PHYSICAL THERAPY ADULT  Goal: Performs mobility at highest level of function for planned discharge setting.  See evaluation for individualized goals.  Description: Treatment/Interventions: Functional transfer training, LE strengthening/ROM, Elevations, Therapeutic exercise, Endurance training, Gait training, Bed mobility, Equipment eval/education, Patient/family training, Compensatory technique education          See flowsheet documentation for full assessment, interventions and recommendations.  Outcome: Progressing  Note:    Problem List: Decreased strength, Decreased endurance, Decreased mobility, Impaired balance, Decreased safety awareness, Impaired sensation  Assessment: pt began tx session lying supine in the bed as pt was agreeable to participate in PT intervention. Pt to focus on bed mobility, transfer training, TE activities, posture/balance with gait and stair trials. In compariosn to previous PT intervention pt continues to remain consistant for requiring /s for all bed mobility but progressed with functional transfers and ambulation distance. pt was mod I for all functional transfers with and w/o an AD. pt increased ambulation distance to 450'x1 RW with /s as pt had no LOB while holding a conversation and avoiding obstacles. pt completed TE activities with AROM, no increases in pain and good from with all exercises. pt educated on all safe stair trial strategies prior to initiating steps. pt completed a full flight of steps (14) with bilateral hand rails, /s and no LOB. Post tx session pt in the recliner with call bell, legs leevated, chair alarm activated and OT Frances present        Rehab Resource Intensity Level, PT: III (Minimum Resource Intensity)    See flowsheet documentation for full assessment.

## 2024-09-24 NOTE — PROGRESS NOTES
Progress Note - Orthopedics   Name: Tian Garcia 74 y.o. male I MRN: [id number]  Unit/Bed#: S -01 I Date of Admission: 9/23/2024   Date of Service: 9/24/2024 I Hospital Day: 0    Assessment & Plan  S/P total knee arthroplasty, left  S/p left total knee arthroplasty with Dr. Galindo 9/23/2024 (POD 1)  Weight bearing as tolerated to the left lower extremity.   Cleared for discharge from PT/OT.   Pain currently well controlled.   Tolerating oral intake.   Voiding without issue.   DVT ppx: coumadin.   Dispo: stable for discharge.   Follow up with Dr. Galindo upon discharge.     Paroxysmal A-fib (HCC)  Cleared for discharge per cardiology.     Ok for discharge from Orthopedics service perspective. Please contact the [employer] role for the Orthopedics service with any questions/concerns.    History of Present Illness   74 y.o.male seen and examined. Has worked with PT/OT. Feeling great. No pain. Voiding well, eating and drinking well. No current complaints. Eager for discharge. Had his surgical dressings come loose when standing this morning.     Objective      Temp:  [96.8 °F (36 °C)-98.7 °F (37.1 °C)] 98.7 °F (37.1 °C)  HR:  [] 91  Resp:  [16-20] 16  BP: (104-142)/(65-94) 116/71  O2 Device: None (Room air)          I/O         09/22 0701  09/23 0700 09/23 0701  09/24 0700 09/24 0701  09/25 0700    P.O.  240     I.V.  3261.3     Total Intake  3501.3     Urine  1450 250    Total Output  1450 250    Net  +2051.3 -250                 Lines/Drains/Airways       Active Status       Name Placement date Placement time Site Days    Non-Surgical Airway Oral pharyngeal airway 90 mm 09/23/24  1247  90 mm  less than 1                  Physical Exam Musculoskeletal: leftlower  Surgical dressings have come loose. Staple line intact without evidence of wound dehiscence. Re-wrapped this morning.   No significant pain this morning.   Thigh and calf are soft and compressible.   Motor intact to +FHL/EHL, +ankle  dorsi/plantar flexion  Sensation intact to saphenous, sural, tibial, superficial peroneal nerve, and deep peroneal  2+ DP pulse  No calf swelling or tenderness to palpation      Lab Results: I have reviewed the following results: CBC/BMP:   .     09/24/24 0438   WBC 11.58*   HGB 11.0*   HCT 33.8*      SODIUM 137   K 4.4      CO2 25   BUN 36*   CREATININE 1.98*   GLUC 121      Recent Labs     09/23/24  1136 09/24/24 0438   WBC  --  11.58*   HGB  --  11.0*   HCT  --  33.8*   PLT  --  170   BUN  --  36*   CREATININE  --  1.98*   INR 1.05  --      Blood Culture:    Lab Results   Component Value Date    BLOODCX No Growth After 5 Days. 05/10/2022     Wound Culture:   Lab Results   Component Value Date    WOUNDCULT (A) 03/31/2022     2+ Growth of Methicillin Resistant Staphylococcus aureus

## 2024-09-24 NOTE — ASSESSMENT & PLAN NOTE
- rate controlled.      Plan:  Continue metoprolol succinate 25mg BID on discharge.  Continue remainder of medications.  Stable for discharge from cardiac standpoint.

## 2024-09-24 NOTE — PHYSICAL THERAPY NOTE
PHYSICAL THERAPY NOTE          Patient Name: Tian Garcia  Today's Date: 9/24/2024 09/24/24 0844   PT Last Visit   PT Visit Date 09/24/24   Note Type   Note Type Treatment   Pain Assessment   Pain Assessment Tool 0-10   Pain Score No Pain   Pain Location/Orientation Orientation: Left;Location: Knee   Patient's Stated Pain Goal No pain   Hospital Pain Intervention(s) Repositioned;Ambulation/increased activity;Elevated;Rest   Multiple Pain Sites No   General   Chart Reviewed Yes   Response to Previous Treatment Patient with no complaints from previous session.   Family/Caregiver Present No   Cognition   Overall Cognitive Status WFL   Arousal/Participation Alert;Responsive;Cooperative   Attention Within functional limits   Orientation Level Oriented X4   Memory Within functional limits   Following Commands Follows all commands and directions without difficulty   Comments pt was pleasant, cooperative and motivated to participate in PT intervention   Subjective   Subjective pt stated no pain pre/post tx session, pt agreeable to participate in PT intervention   Bed Mobility   Supine to Sit 5  Supervision   Additional items Assist x 1;HOB elevated;Bedrails;Increased time required   Sit to Supine Unable to assess   Additional Comments pt seated OOB in the recliner post tx session with MIGUEL pelayo   Transfers   Sit to Stand 6  Modified independent   Additional items Assist x 1;Increased time required   Stand to Sit 6  Modified independent   Additional items Assist x 1;Increased time required;Armrests   Stand pivot Unable to assess   Additional Comments pt was able to complete transfers with and w/o an AD with mod I and no LOB   Ambulation/Elevation   Gait pattern Decreased foot clearance;Step through pattern;Decreased heel strike   Gait Assistance 5  Supervision   Additional items Assist x 1;Verbal cues   Assistive Device  Rolling walker   Distance 450'x1 RW   Stair Management Assistance 5  Supervision   Additional items Assist x 1;Verbal cues;Increased time required   Stair Management Technique Two rails;Step to pattern;Foreward;Backward   Number of Stairs 14  (full flight)   Balance   Static Sitting Good   Dynamic Sitting Fair +   Static Standing Fair -  (w/ RW)   Dynamic Standing Poor +   Ambulatory Fair -  (w/ RW)   Endurance Deficit   Endurance Deficit No   Activity Tolerance   Activity Tolerance Patient tolerated treatment well   Nurse Made Aware Spoke to RN Justyna   Exercises   Quad Sets Sitting;10 reps;AROM;Left  (long sit position)   Heelslides Sitting;10 reps;AROM;Left  (long sit position)   Hip Abduction Sitting;10 reps;AROM;Left  (long sit position)   Hip Adduction Sitting;10 reps;AROM;Left  (long sit position)   Knee AROM Long Arc Quad Sitting;10 reps;AROM;Left   Ankle Pumps Sitting;20 reps;AROM;Bilateral   Marching Sitting;10 reps;AROM;Left   Assessment   Problem List Decreased strength;Decreased endurance;Decreased mobility;Impaired balance;Decreased safety awareness;Impaired sensation   Assessment pt began tx session lying supine in the bed as pt was agreeable to participate in PT intervention. Pt to focus on bed mobility, transfer training, TE activities, posture/balance with gait and stair trials. In compariosn to previous PT intervention pt continues to remain consistant for requiring /s for all bed mobility but progressed with functional transfers and ambulation distance. pt was mod I for all functional transfers with and w/o an AD. pt increased ambulation distance to 450'x1 RW with /s as pt had no LOB while holding a conversation and avoiding obstacles. pt completed TE activities with AROM, no increases in pain and good from with all exercises. pt educated on all safe stair trial strategies prior to initiating steps. pt completed a full flight of steps (14) with bilateral hand rails, /s and no LOB. Post tx session  pt in the recliner with call bell, legs leevated, chair alarm activated and OT Frances present   Goals   Patient Goals none stated   PT Treatment Day 1   Plan   Treatment/Interventions Functional transfer training;LE strengthening/ROM;Elevations;Therapeutic exercise;Endurance training;Patient/family training;Equipment eval/education;Bed mobility;Gait training;Spoke to nursing;OT   Progress Improving as expected   PT Frequency Twice a day   Discharge Recommendation   Rehab Resource Intensity Level, PT III (Minimum Resource Intensity)   AM-PAC Basic Mobility Inpatient   Turning in Flat Bed Without Bedrails 3   Lying on Back to Sitting on Edge of Flat Bed Without Bedrails 3   Moving Bed to Chair 3   Standing Up From Chair Using Arms 4   Walk in Room 3   Climb 3-5 Stairs With Railing 3   Basic Mobility Inpatient Raw Score 19   Basic Mobility Standardized Score 42.48   [facility] Highest Level Of Mobility   [facility] Goal 6: Walk 10 steps or more   Education   Education Provided Mobility training;Assistive device;Other  (functional transfers, stair trials and TE activities)   Patient Demonstrates acceptance/verbal understanding   End of Consult   Patient Position at End of Consult Bedside chair;All needs within reach;Bed/Chair alarm activated   The patient's AM-PAC Basic Mobility Inpatient Short Form Raw Score is 19. A Raw score of greater than 16 suggests the patient may benefit from discharge to home. Please also refer to the recommendation of the Physical Therapist for safe discharge planning.    Rip Burton

## 2024-09-24 NOTE — OCCUPATIONAL THERAPY NOTE
Occupational Therapy Treatment and Discharge Note     Patient Name: Tian Garcia  Today's Date: 9/24/2024  Problem List  Principal Problem:    S/P total knee arthroplasty, left  Active Problems:    Primary hypertension    Paroxysmal A-fib (HCC)        09/24/24 0907   OT Last Visit   OT Visit Date 09/24/24   Note Type   Note Type Treatment   Pain Assessment   Pain Assessment Tool 0-10   Pain Score No Pain   Restrictions/Precautions   Weight Bearing Precautions Per Order Yes   LLE Weight Bearing Per Order WBAT  (POD#1 L TKA)   Other Precautions WBS;Chair Alarm;Bed Alarm;Fall Risk   Lifestyle   Autonomy Pt lives w/ spouse in a multi-level home. Independent with ADLs/IADLs and functional mobility, no use of AD. (-) falls and (+) driving   Reciprocal Relationships Supportive family   Service to Others Retired   ADL   Grooming Assistance 7  Independent   Grooming Comments wash dry hands and face and complete oral care standing at the sink w/o support or steadying   UB Dressing Assistance 7  Independent   UB Dressing Comments [facility] gown and delio button up t-shirt in standing   LB Dressing Assistance 5  Supervision/Setup   LB Dressing Deficit Setup;Thread RLE into underwear;Thread LLE into underwear;Thread RLE into pants;Thread LLE into pants;Pull up over hips   LB Dressing Comments Education on compensatory techniques for decreased knee ROM.   Toileting Assistance  6  Modified independent   Toileting Deficit Steadying   Toileting Comments standing to urinate at toilet w/ support of UEs on RW   Functional Standing Tolerance   Time 4 mins + 1 min   Activity participation in ADL tasks   Comments distant supervision ~ intermittent support of RW. Tolerated well.   Bed Mobility   Additional Comments Pt received OOB in recliner chair   Transfers   Sit to Stand 6  Modified independent   Additional items Armrests;Verbal cues   Stand to Sit 6  Modified independent   Additional items Armrests;Verbal cues   Additional  "Comments Pt demonstates good recall of precautions. STS completed x 3 from recliner chair.   Functional Mobility   Functional Mobility 5  Supervision   Additional Comments for functional household distance to<>from the bathroom w/ use of RW. HR 70s-100s throughout the session   Additional items Rolling walker   Car Transfers   Car Transfers Comments Education provided on proper body mechanics for safe car transfers - verbalized understanding.   Subjective   Subjective \"I did not sleep well last night\"   Cognition   Overall Cognitive Status WFL   Arousal/Participation Responsive;Alert;Cooperative   Attention Within functional limits   Orientation Level Oriented X4   Memory Within functional limits   Following Commands Follows all commands and directions without difficulty   Comments Pt is very motivated and pleasent  (Pt ID via wristband, name and )   Activity Tolerance   Activity Tolerance Patient tolerated treatment well   Medical Staff Made Aware Spoke with Ortho VEENA Weeks, LUIS ANTONIO Erwin, RN Justyna   Assessment   Assessment Patient seen for OT treatment on 2024 s/p admission for S/P total knee arthroplasty, left Patient agreeable to OT session. Patient participated in fall prevention , self-care transfers, functional mobility, and ADLs with intervention focus on optimizing independence in functional mobility, self-care transfers and ADL tasks. Tian Garcia demonstrates adequate functional independence and safety for POD#1 D/C.   Personal factors continuing to impact D/C include: steps to enter/navigate the home and FOS to second floor. Supporting factors include: able to maintain FFSU, support system available, and attitude towards recovery. No further acute OT needs identified at this time. Recommend continued active ADL participation and mobilization with hospital staff while in the hospital to increase pt’s endurance and strength upon D/C. From OT standpoint, recommend D/C Level IIII (No post-acute " rehabilitation needs)  with family support when medically cleared. D/C pt from OT caseload at this time.   Plan   Treatment Interventions ADL retraining;Functional transfer training;Endurance training;Patient/family training;Equipment evaluation/education;Compensatory technique education;Energy conservation;Activityengagement   OT Treatment Day 1   Discharge Recommendation   Rehab Resource Intensity Level, OT No post-acute rehabilitation needs   Additional Comments  The patient's raw score on the -PAC Daily Activity Inpatient Short Form is 24. A raw score of greater than or equal to 19 suggests the patient may benefit from discharge to home. Please refer to the recommendation of the Occupational Therapist for safe discharge planning.   AM-PAC Daily Activity Inpatient   Lower Body Dressing 4   Bathing 4   Toileting 4   Upper Body Dressing 4   Grooming 4   Eating 4   Daily Activity Raw Score 24   Daily Activity Standardized Score (Calc for Raw Score >=11) 57.54   AM-PAC Applied Cognition Inpatient   Following a Speech/Presentation 4   Understanding Ordinary Conversation 4   Taking Medications 4   Remembering Where Things Are Placed or Put Away 4   Remembering List of 4-5 Errands 4   Taking Care of Complicated Tasks 4   Applied Cognition Raw Score 24   Applied Cognition Standardized Score 62.21   End of Consult   Education Provided Yes   Patient Position at End of Consult Bedside chair;Bed/Chair alarm activated;All needs within reach   Nurse Communication Nurse aware of consult     Frances Atkins MS OTR/L   [place] Licensure# [id number]

## 2024-09-25 ENCOUNTER — TELEPHONE (OUTPATIENT)
Dept: OBGYN CLINIC | Facility: HOSPITAL | Age: 74
End: 2024-09-25

## 2024-09-25 NOTE — TELEPHONE ENCOUNTER
"Patient contacted for a postoperative follow up assessment. Patient reports doing \"a little sore\". Patient states current pain level of a  1/10  when sitting and 9/10 when walking with RW.  Patient denies increase in swelling and dressing is clean, dry and intact. Patient is icing the site regularly.     We reviewed patients AVS medication list. Patient is taking Tylenol 650mg every 8 hours, Oxycodone 5mg PRN, Warfarin sodium 5mg, Colace 100mg BID. Patient has not yet had a BM but is passing gas.      Patient denies nausea, vomiting, abdominal pain, chest pain, shortness of breath, fever, dizziness and calf pain. Patient confirmed post-op appointment with surgeon on  10/08. Patient does not have any other questions or concerns at this time. Pt was encouraged to call with any questions, concerns or issues.    "

## 2024-09-26 ENCOUNTER — TELEPHONE (OUTPATIENT)
Age: 74
End: 2024-09-26

## 2024-09-26 LAB
ATRIAL RATE: 150 BPM
ATRIAL RATE: 151 BPM
PR INTERVAL: 0 MS
PR INTERVAL: 184 MS
QRS AXIS: 100 DEGREES
QRS AXIS: 96 DEGREES
QRSD INTERVAL: 140 MS
QRSD INTERVAL: 146 MS
QT INTERVAL: 298 MS
QT INTERVAL: 344 MS
QTC INTERVAL: 414 MS
QTC INTERVAL: 421 MS
T WAVE AXIS: -34 DEGREES
T WAVE AXIS: -55 DEGREES
VENTRICULAR RATE: 116 BPM
VENTRICULAR RATE: 90 BPM

## 2024-09-26 PROCEDURE — 93010 ELECTROCARDIOGRAM REPORT: CPT | Performed by: INTERNAL MEDICINE

## 2024-09-26 NOTE — TELEPHONE ENCOUNTER
Caller: patient     Doctor: paddy    Reason for call: dressing got wet from ice pack and dressing fell off, patient says it looks fine and looks like it is healing just fine. Patient would like to know what to do with the dressing, should he re wrap it ?    Call back#: [phone]

## 2024-10-01 ENCOUNTER — TELEPHONE (OUTPATIENT)
Age: 74
End: 2024-10-01

## 2024-10-01 NOTE — TELEPHONE ENCOUNTER
Marj from [facility] would like to go over the patients medication list as she has been unable to reach the patient.

## 2024-10-02 ENCOUNTER — EVALUATION (OUTPATIENT)
Dept: PHYSICAL THERAPY | Facility: CLINIC | Age: 74
End: 2024-10-02
Payer: COMMERCIAL

## 2024-10-02 DIAGNOSIS — Z96.652 S/P TOTAL KNEE ARTHROPLASTY, LEFT: ICD-10-CM

## 2024-10-02 PROCEDURE — 97161 PT EVAL LOW COMPLEX 20 MIN: CPT | Performed by: PHYSICAL THERAPIST

## 2024-10-02 NOTE — PROGRESS NOTES
PT Evaluation     Today's date: 10/2/2024  Patient name: Tian Garcia  : 1950  MRN: [id number]  Referring provider: Deepak Crane*  Dx:   Encounter Diagnosis     ICD-10-CM    1. S/P total knee arthroplasty, left  Z96.652 Ambulatory referral to Physical Therapy                     Assessment/Plan    Subjective Evaluation    History of Present Illness  Mechanism of injury: Patient reports to physical therapy s/p L TKR on . He has been walking with a SPC, completing HEP regularly. Has been independent with ambulation around the house, is independent with ADLs. Denies numbness/loss of sensation into left LE.   Patient Goals  Patient goals for therapy: increased strength, improved balance and decreased pain    Pain  Current pain ratin  At worst pain ratin  Location: left knee  Relieving factors: relaxation  Aggravating factors: walking    Social Support  Lives in: multiple-level home  Lives with: spouse        Objective    R knee ROM: 102 degrees flexion, -5 degrees flexion          Short Term Goal Expiration Date:(24)  Long Term Goal Expiration Date: (24)  POC Expiration Date: (24)      POC expires Unit limit Auth Expiration date PT/OT/ST + Visit Limit?                                 Visit/Unit Tracking  AUTH Status:  Date               Used               Remaining                     Precautions        Manuals                                        Neuro Re-Ed                                                                 Ther Ex                                                                        Ther Activity                        Gait Training                        Modalities

## 2024-10-02 NOTE — TELEPHONE ENCOUNTER
Left detailed message for Marj. Advised that if she calls back with her fax number I can send over his med list from his surgical discharge.

## 2024-10-07 ENCOUNTER — OFFICE VISIT (OUTPATIENT)
Dept: PHYSICAL THERAPY | Facility: CLINIC | Age: 74
End: 2024-10-07
Payer: COMMERCIAL

## 2024-10-07 ENCOUNTER — ANTICOAG VISIT (OUTPATIENT)
Dept: FAMILY MEDICINE CLINIC | Facility: HOSPITAL | Age: 74
End: 2024-10-07

## 2024-10-07 DIAGNOSIS — R51.9 INTRACTABLE HEADACHE, UNSPECIFIED CHRONICITY PATTERN, UNSPECIFIED HEADACHE TYPE: ICD-10-CM

## 2024-10-07 DIAGNOSIS — Z96.652 S/P TOTAL KNEE ARTHROPLASTY, LEFT: Primary | ICD-10-CM

## 2024-10-07 PROCEDURE — 97110 THERAPEUTIC EXERCISES: CPT | Performed by: PHYSICAL THERAPIST

## 2024-10-07 PROCEDURE — 97112 NEUROMUSCULAR REEDUCATION: CPT | Performed by: PHYSICAL THERAPIST

## 2024-10-07 NOTE — PROGRESS NOTES
"Daily Note     Today's date: 10/7/2024  Patient name: Tian Garcia  : 1950  MRN: [id number]  Referring provider: Deepak Crane*  Dx:   Encounter Diagnosis     ICD-10-CM    1. S/P total knee arthroplasty, left  Z96.652                      Subjective: Patient reports to physical therapy this date with reports of some knee soreness      Objective: See treatment diary below      Assessment: With ROM exercises pt was able to improve significantly with knee flexion this date. Plan to add more functional balance and strengthening exercises next session in addition to ROM and reviewing HEP.       Plan: Continue per plan of care.      Short Term Goal Expiration Date:(24)  Long Term Goal Expiration Date: (24)  POC Expiration Date: (24)      POC expires Unit limit Auth Expiration date PT/OT/ST + Visit Limit?                                 Visit/Unit Tracking  AUTH Status:  Date 10/7              Used 2              Remaining                     Precautions        Manuals 10/7                                       Neuro Re-Ed         Step ups 2x10 6\" step                                                       Ther Ex        SLR 10x2       S/l hip abd 10x2       bridges 10x2       Heel slide  L only 10x 2       Heel raise 30x                               Ther Activity                        Gait Training                        Modalities                                      "

## 2024-10-08 ENCOUNTER — HOSPITAL ENCOUNTER (OUTPATIENT)
Dept: RADIOLOGY | Facility: HOSPITAL | Age: 74
Discharge: HOME | End: 2024-10-08
Payer: COMMERCIAL

## 2024-10-08 ENCOUNTER — OFFICE VISIT (OUTPATIENT)
Dept: OBGYN CLINIC | Facility: CLINIC | Age: 74
End: 2024-10-08

## 2024-10-08 VITALS — WEIGHT: 219 LBS | HEIGHT: 71 IN | BODY MASS INDEX: 30.66 KG/M2

## 2024-10-08 DIAGNOSIS — Z96.652 S/P TOTAL KNEE REPLACEMENT USING CEMENT, LEFT: Primary | ICD-10-CM

## 2024-10-08 DIAGNOSIS — Z96.652 S/P TOTAL KNEE REPLACEMENT USING CEMENT, LEFT: ICD-10-CM

## 2024-10-08 PROCEDURE — 99024 POSTOP FOLLOW-UP VISIT: CPT | Performed by: PHYSICIAN ASSISTANT

## 2024-10-08 PROCEDURE — 73562 X-RAY EXAM OF KNEE 3: CPT

## 2024-10-08 RX ORDER — GABAPENTIN 300 MG/1
CAPSULE ORAL
Qty: 90 CAPSULE | Refills: 1 | Status: SHIPPED | OUTPATIENT
Start: 2024-10-08

## 2024-10-08 NOTE — PROGRESS NOTES
74 y.o.male presents for 2 weeks postoperative visit status post left TKA. Patient denies any chest pain, shortness or breath or calf pain .  Patient is taking Coumadin for his atrial fibrillation which also serves as hisr DVT prophylaxis.  Pain is well controlled with medication.  Patient is ambulating well doing well physical therapy.    Review of Systems  Review of systems negative unless otherwise specified in HPI    Past Medical History  Past Medical History:   Diagnosis Date    Acute venous embolism and thrombosis of deep vessels of proximal lower extremity (HCC)     Last assessed: 5/18/15    JANEL (acute kidney injury) ([facility])     Arthritis     Cellulitis     LE    Chronic kidney disease 03/2020    Acute Kidney Injury    CPAP (continuous positive airway pressure) dependence     Factor V Leiden ([facility])     Forgetfulness     Gross hematuria 05/17/2022    Headache(784.0) 03/2022    Never till cervical  spine surgery    Heart murmur 03/2020    Told when in hospital    History of transfusion     [place] (hard of hearing)     Hypoalbuminemia 05/11/2022    Other acute osteomyelitis, other site ([facility]) 01/03/2023    Paroxysmal atrial fibrillation ([facility])     Last assessed: 11/2/15    Sleep apnea        Past Surgical History  Past Surgical History:   Procedure Laterality Date    BACK SURGERY      Lower    CARDIAC CATHETERIZATION N/A 04/09/2023    Procedure: Cardiac pci;  Surgeon: Bird Cast MD;  [facility]: [facility];  Service: Cardiology    CARDIAC CATHETERIZATION N/A 04/09/2023    Procedure: Cardiac Coronary Angiogram;  Surgeon: Bird Cast MD;  [facility]: [facility];  Service: Cardiology    CATARACT EXTRACTION      COLON SURGERY      COLONOSCOPY      INCISION AND DRAINAGE POSTERIOR SPINE N/A 04/01/2022    Procedure: Posterior cervical evacuation of postoperative collection and debridement with placement of drains C3-T1;  Surgeon: Rajeev Garcia MD;  Location: [facility];  Service: Neurosurgery     IR BIOPSY KIDNEY RANDOM  05/26/2022    IR TUNNELED DIALYSIS CATHETER PLACEMENT  05/23/2022    IR TUNNELED DIALYSIS CATHETER REMOVAL  06/02/2022    OR ARTHRD ANT INTERBODY MIN DSC CRV BELOW C2 N/A 03/11/2022    Procedure: Anterior cervical discectomy and fixation fusion C5/6 and C6/7; Posterior cervical decompression and instrumented fusion C3-T1;  Surgeon: Rajeev Garcia MD;  Location: [facility];  Service: Neurosurgery    OR ARTHRP KNE CONDYLE&PLATU MEDIAL&LAT COMPARTMENTS Right 4/8/2024    Procedure: ARTHROPLASTY KNEE TOTAL and all associated procedures;  Surgeon: Dot Galindo MD;  [facility];  Service: Orthopedics    OR ARTHRP KNE CONDYLE&PLATU MEDIAL&LAT COMPARTMENTS Left 9/23/2024    Procedure: ARTHROPLASTY KNEE TOTAL;  Surgeon: Dot Galindo MD;  [facility];  Service: Orthopedics    RENAL BIOPSY  6/2022    SPINE SURGERY  03/11/2022    Cervical myelopathy/ cervical fusion       Current Medications  Current Outpatient Medications on File Prior to Visit   Medication Sig Dispense Refill    atorvastatin (LIPITOR) 80 mg tablet TAKE 1 TABLET BY MOUTH EVERY EVENING 90 tablet 1    DULoxetine (CYMBALTA) 60 mg delayed release capsule TAKE 1 CAPSULE BY MOUTH EVERY DAY 90 capsule 1    ezetimibe (ZETIA) 10 mg tablet TAKE 1 TABLET BY MOUTH EVERY DAY 90 tablet 3    glucose blood (Accu-Chek Guide) test strip Use one new strip daily dx r73.01 100 strip 1    lisinopril (ZESTRIL) 5 mg tablet Take 1 tablet (5 mg total) by mouth daily 90 tablet 3    metoprolol succinate (TOPROL-XL) 25 mg 24 hr tablet Take 1 tablet (25 mg total) by mouth 2 (two) times a day 90 tablet 0    potassium chloride (Klor-Con M20) 20 mEq tablet Take 1 tablet (20 mEq total) by mouth daily 90 tablet 3    tamsulosin (FLOMAX) 0.4 mg Take 1 capsule (0.4 mg total) by mouth daily with dinner 90 capsule 1    tirzepatide (Mounjaro) 2.5 MG/0.5ML INJECT 0.5 ML (2.5 MG TOTAL) UNDER THE SKIN EVERY 7 DAYS 2 mL 1    torsemide  (DEMADEX) 20 mg tablet TAKE 1 AND 1/2 TABLETS ALTERNATING WITH 1 TABLET DAILY AS DIRECTED (Patient taking differently: Take 10 mg by mouth PT taking 1/2 tablet daily (10mg) usually) 135 tablet 1    warfarin (COUMADIN) 5 mg tablet TAKE ONE-HALF TABLET BY MOUTH  DAILY , EXCEPT TAKE 1 TABLET BY  MOUTH ON WEDNESDAY AND SATURDAY (Patient taking differently: TAKE ONE-HALF TABLET BY MOUTH  DAILY , EXCEPT TAKE 1 TABLET Tuesday  Managed by Internal Medicine.) 58 tablet 3    zolpidem (AMBIEN) 10 mg tablet Take 1 tablet (10 mg total) by mouth daily at bedtime as needed for sleep 90 tablet 0    acetaminophen (TYLENOL) 650 mg CR tablet Take 1 tablet (650 mg total) by mouth every 8 (eight) hours as needed for mild pain 30 tablet 0    ascorbic acid (VITAMIN C) 500 MG tablet Take 1 tablet (500 mg total) by mouth 2 (two) times a day 60 tablet 1    Aspirin 81 MG CAPS Take by mouth      docusate sodium (COLACE) 100 mg capsule Take 1 capsule (100 mg total) by mouth 2 (two) times a day 10 capsule 0    ferrous sulfate 324 (65 Fe) mg TAKE 1 TABLET (324 MG TOTAL) BY MOUTH DAILY BEFORE BREAKFAST 90 tablet 1    folic acid (FOLVITE) 1 mg tablet Take 1 tablet (1 mg total) by mouth daily 30 tablet 1    gabapentin (NEURONTIN) 300 mg capsule TAKE 1 CAPSULE BY MOUTH DAILY AT BEDTIME 90 capsule 1    mometasone (ELOCON) 0.1 % cream APPLY TO AFFECTED AREA TOPICALLY EVERY DAY (Patient not taking: Reported on 9/10/2024) 45 g 1    nitroglycerin (NITROSTAT) 0.4 mg SL tablet Place 1 tablet (0.4 mg total) under the tongue every 5 (five) minutes as needed for chest pain (Patient not taking: Reported on 10/8/2024) 30 tablet 0    ondansetron (ZOFRAN) 4 mg tablet Take 1 tablet (4 mg total) by mouth every 8 (eight) hours as needed for nausea or vomiting 5 tablet 0     No current facility-administered medications on file prior to visit.       Recent Labs (HCT,HGB,PT,INR,ESR,CRP,GLU,HgA1C)  0   Lab Value Date/Time    HCT 33.8 (L) 09/24/2024 0438    HCT 39.5  01/23/2015 0440    HGB 11.0 (L) 09/24/2024 0438    HGB 12.9 01/23/2015 0440    WBC 11.58 (H) 09/24/2024 0438    WBC 8.43 01/23/2015 0440    INR 2.6 (H) 10/05/2024 0949    INR 1.05 09/23/2024 1136    INR 2.2 (H) 01/11/2018 1405     (H) 05/10/2022 1949    CRP 8.2 (H) 05/10/2022 1950    GLUCOSE 102 (H) 10/25/2017 0951    HGBA1C 6.2 (H) 04/09/2023 1902    HGBA1C 6.2 (H) 11/02/2021 1302           Body mass index is 30.54 kg/m².  Wt Readings from Last 3 Encounters:   10/08/24 99.3 kg (219 lb)   09/10/24 99.3 kg (219 lb)   08/27/24 98.4 kg (217 lb)       Physical exam   General: Awake, Alert, Oriented   Eyes: Pupils equal, round and reactive to light    Heart: regular rate and rhythm   Lungs: No audible wheezing   Abdomen: soft  left Lower extremity      Incision well approximated without erythema no tenderness to palpation   5 degrees off of full knee extension 100 degrees of flexion   Active ankle dorsal and plantarflexion without pain, calf nontender palpation   sensation mildly decreased daniel-incisionally otherwise intact   Distal pulses present      Imaging  X rays of the left knee reviewed.  X rays reveal excellently aligned left total knee arthroplasty without evidence of loosening or osseous abnormality.    Assessment:  Status post 2 weeks left TKA    Plan:  Weight bearing as tolerated left Lower extremity  Physical therapy  [facility] antibiotic prophylaxis recommended  Pain medication as needed  Follow-up in 2 months and repeat x-rays left knee

## 2024-10-11 ENCOUNTER — OFFICE VISIT (OUTPATIENT)
Dept: PHYSICAL THERAPY | Facility: CLINIC | Age: 74
End: 2024-10-11
Payer: COMMERCIAL

## 2024-10-11 DIAGNOSIS — Z96.652 S/P TOTAL KNEE ARTHROPLASTY, LEFT: Primary | ICD-10-CM

## 2024-10-11 PROCEDURE — 97112 NEUROMUSCULAR REEDUCATION: CPT

## 2024-10-11 NOTE — PROGRESS NOTES
"Daily Note     Today's date: 10/11/2024  Patient name: Tian Garcia  : 1950  MRN: [id number]  Referring provider: Deepak Crane*  Dx:   Encounter Diagnosis     ICD-10-CM    1. S/P total knee arthroplasty, left  Z96.652                        Subjective: Pt presents with continued reports of L knee soreness but states \"it's definitely getting better.\"      Objective: See treatment diary below      Assessment: Pt continues to present with reports of L knee soreness, however, he demonstrates increased tolerance for standing tasks today. Initiated lateral step ups, modified lunges, and mini squats today to progress strengthening program. Additionally, performed parts of HEP in standing position vs supine previously. Pt requires intermittent verbal cues for form during these tasks to promote optimal LE alignment throughout. Pt is demonstrating improvements in standing tolerance, functional strength, and functional ROM. He continues to benefit from skilled PT services to address limitations in strength, standing tolerance, and ROM to maximize his functional potential and return to all prior life roles and responsibilities.       Plan: Continue per plan of care.      Short Term Goal Expiration Date:(24)  Long Term Goal Expiration Date: (24)  POC Expiration Date: (24)      POC expires Unit limit Auth Expiration date PT/OT/ST + Visit Limit?                                 Visit/Unit Tracking  AUTH Status:  Date 10/7 10/11             Used 2 3             Remaining                     Precautions        Manuals 10/7 10/11                                      Neuro Re-Ed         Step ups 2x10 6\" step 2x10 6\" step      Lateral step ups  2x10 6\" step      Modified lunge  2x10 with forward foot on 6\" step      Mini squats  2x10                              Ther Ex        SLR 10x2       S/l hip abd 10x2       bridges 10x2       Heel slide  L only 10x 2       Heel raise 30x 3x10      Stdg Hip Abd  " 2x10      Stdg Hip Ext  2x10              Ther Activity                        Gait Training                        Modalities

## 2024-10-14 ENCOUNTER — OFFICE VISIT (OUTPATIENT)
Dept: PHYSICAL THERAPY | Facility: CLINIC | Age: 74
End: 2024-10-14
Payer: COMMERCIAL

## 2024-10-14 DIAGNOSIS — Z96.652 S/P TOTAL KNEE ARTHROPLASTY, LEFT: Primary | ICD-10-CM

## 2024-10-14 PROCEDURE — 97112 NEUROMUSCULAR REEDUCATION: CPT

## 2024-10-14 PROCEDURE — 97110 THERAPEUTIC EXERCISES: CPT

## 2024-10-14 NOTE — PROGRESS NOTES
"Daily Note     Today's date: 10/14/2024  Patient name: Tian Garcia  : 1950  MRN: [id number]  Referring provider: Deepak Crane*  Dx:   Encounter Diagnosis     ICD-10-CM    1. S/P total knee arthroplasty, left  Z96.652           Start Time: 930  Stop Time: 1015  Total time in clinic (min): 45 minutes    Subjective Pt reports feeling a little better about walking with the SPC and plans on going hurting this year. However, pt cont to c/o pain and soreness at times with increase walking activities and reports he does ice post skilled OP PT .      Objective: See treatment diary below      Assessment: Tolerated treatment well. Patient demonstrated fatigue post treatment. Pt cont to perform 6 inch step up FWD , Lateral , and lungs to increase strengthening and to improve overall motor function/ and dynamic balance awareness and dec fall risk yuri at home. Also Sit to stand with left foot back to increase WB about 10 reps . Cont recommend RW for walking outside and  better balance and caring things outside.      Plan: Continue per plan of care.   Pt requested trail Treadmill next session if possible and recommend pt ice his knee at home.     Short Term Goal Expiration Date:(24)  Long Term Goal Expiration Date: (24)  POC Expiration Date: (24)      POC expires Unit limit Auth Expiration date PT/OT/ST + Visit Limit?                                 Visit/Unit Tracking  AUTH Status:  Date 10/7 10/11 10/14            Used 2 3 4            Remaining                     Precautions        Manuals 10/7 10/11 10/14                                     Neuro Re-Ed         Step ups 2x10 6\" step 2x10 6\" step 2x10 6 \"  step     Lateral step ups  2x10 6\" step 2x10 6 \" step     Modified lunge  2x10 with forward foot on 6\" step 2x10 with forward foot on 6\" step       Mini squats  2x10 2x10     Blue foam balance and ball toss   60 sec x3 sets                      Ther Ex        SLR 10x2       S/l hip abd " 10x2       bridges 10x2       Heel slide  L only 10x 2       Heel raise 30x 3x10 3x10     Stdg Hip Abd  2x10 2x10     Stdg Hip Ext  2x10 2x10                Ther Activity                        Gait Training   100 feet speed walking w/o SPC                     Modalities                                     Start Time: 0930  Stop Time: 1015  Total time in clinic (min): 45 minutes

## 2024-10-18 ENCOUNTER — OFFICE VISIT (OUTPATIENT)
Dept: PHYSICAL THERAPY | Facility: CLINIC | Age: 74
End: 2024-10-18
Payer: COMMERCIAL

## 2024-10-18 DIAGNOSIS — Z96.652 S/P TOTAL KNEE ARTHROPLASTY, LEFT: Primary | ICD-10-CM

## 2024-10-18 PROCEDURE — 97110 THERAPEUTIC EXERCISES: CPT | Performed by: PHYSICAL THERAPIST

## 2024-10-18 NOTE — PROGRESS NOTES
"Daily Note     Today's date: 10/18/2024  Patient name: Tian Garcia  : 1950  MRN: [id number]  Referring provider: Deepak Crane*  Dx:   Encounter Diagnosis     ICD-10-CM    1. S/P total knee arthroplasty, left  Z96.652                      Subjective: Patient reports being very sore since last session and has been taking pain medication since he's been unable to sleep at night.       Objective: See treatment diary below      Assessment: Attempted to complete session without doing weight bearing exercises to not increase any inflammation for today. Encouraged pt to continue supine exercises at home over the next few days until inflammation reduces then will continue back into weight bearing activity.       Plan: Continue per plan of care.      Short Term Goal Expiration Date:(24)  Long Term Goal Expiration Date: (24)  POC Expiration Date: (24)      POC expires Unit limit Auth Expiration date PT/OT/ST + Visit Limit?                                 Visit/Unit Tracking  AUTH Status:  Date 10/7 10/11 10/14 10/18           Used 2 3 4 5           Remaining                     Precautions        Manuals 10/7 10/11 10/14 10/18                                    Neuro Re-Ed         Step ups 2x10 6\" step 2x10 6\" step 2x10 6 \"  step     Lateral step ups  2x10 6\" step 2x10 6 \" step     Modified lunge  2x10 with forward foot on 6\" step 2x10 with forward foot on 6\" step       Mini squats  2x10 2x10     Blue foam balance and ball toss   60 sec x3 sets                      Ther Ex        SLR 10x2   10x 2 L LE    S/l hip abd 10x2   10x2     bridges 10x2       Heel slide  L only 10x 2       Heel raise 30x 3x10 3x10 30x    Stdg Hip Abd  2x10 2x10     Stdg Hip Ext  2x10 2x10        Recumbent bike    8 min to improve ROM             LAQ    2x10 L LE    Ther Activity                        Gait Training   100 feet speed walking w/o SPC                     Modalities                               "       Start Time: 0930  Stop Time: 1015  Total time in clinic (min): 45 minutes

## 2024-10-21 ENCOUNTER — APPOINTMENT (OUTPATIENT)
Dept: PHYSICAL THERAPY | Facility: CLINIC | Age: 74
End: 2024-10-21
Payer: COMMERCIAL

## 2024-10-22 ENCOUNTER — OFFICE VISIT (OUTPATIENT)
Dept: PHYSICAL THERAPY | Facility: CLINIC | Age: 74
End: 2024-10-22
Payer: COMMERCIAL

## 2024-10-22 DIAGNOSIS — Z96.652 S/P TOTAL KNEE ARTHROPLASTY, LEFT: Primary | ICD-10-CM

## 2024-10-22 PROCEDURE — 97112 NEUROMUSCULAR REEDUCATION: CPT | Performed by: PHYSICAL THERAPIST

## 2024-10-22 PROCEDURE — 97110 THERAPEUTIC EXERCISES: CPT | Performed by: PHYSICAL THERAPIST

## 2024-10-22 NOTE — PROGRESS NOTES
"Daily Note     Today's date: 10/22/2024  Patient name: Tian Garcia  : 1950  MRN: [id number]  Referring provider: Deepak Crane*  Dx:   Encounter Diagnosis     ICD-10-CM    1. S/P total knee arthroplasty, left  Z96.652                      Subjective: \"I am a little sore, but I feel better from last week.\"      Objective: See treatment diary below      Assessment: Patient tolerated treatment well as he was completing interventions without increased pain or soreness.  Treadmill was added for warm up to increase walking tolerance and increase cardiovascular health. Lunges were progressed in the parallel bars with UE support and tolerated well. Improved control of left knee stability noted with all weight bearing activity. Patient is a good candidate for continued skilled PT to improve LE ROM, strength, and balance.      Plan: Continue per plan of care.      Short Term Goal Expiration Date:(24)  Long Term Goal Expiration Date: (24)  POC Expiration Date: (24)      POC expires Unit limit Auth Expiration date PT/OT/ST + Visit Limit?                                 Visit/Unit Tracking  AUTH Status:  Date 10/7 10/11 10/14 10/18 10/22          Used 2 3 4 5 6          Remaining      2               Precautions        Manuals 10/7 10/11 10/14 10/18 10/22                                   Neuro Re-Ed         Step ups 2x10 6\" step 2x10 6\" step 2x10 6 \"  step  10x 8\" step fwd/lat with UE    Lateral step ups  2x10 6\" step 2x10 6 \" step     Modified lunge  2x10 with forward foot on 6\" step 2x10 with forward foot on 6\" step    Forward lunge in parallel bars 4 x 8ft with UE   Mini squats  2x10 2x10     Blue foam balance and ball toss   60 sec x3 sets                      Ther Ex        SLR 10x2   10x 2 L LE    S/l hip abd 10x2   10x2     bridges 10x2       Heel slide  L only 10x 2       Heel raise 30x 3x10 3x10 30x 30x   Stdg Hip Abd  2x10 2x10     Stdg Hip Ext  2x10 2x10        Recumbent bike    8 " min to improve ROM     treadmill     10 min 1.2 mph Prn UE    LAQ    2x10 L LE    TKE     2x10 b/l LE with green TB   Ther Activity                        Gait Training   100 feet speed walking w/o SPC                     Modalities                                     Start Time: 0930  Stop Time: 1015  Total time in clinic (min): 45 minutes

## 2024-10-25 ENCOUNTER — OFFICE VISIT (OUTPATIENT)
Dept: PHYSICAL THERAPY | Facility: CLINIC | Age: 74
End: 2024-10-25
Payer: COMMERCIAL

## 2024-10-25 DIAGNOSIS — Z96.652 S/P TOTAL KNEE ARTHROPLASTY, LEFT: Primary | ICD-10-CM

## 2024-10-25 PROCEDURE — 97110 THERAPEUTIC EXERCISES: CPT | Performed by: PHYSICAL THERAPIST

## 2024-10-25 PROCEDURE — 97112 NEUROMUSCULAR REEDUCATION: CPT | Performed by: PHYSICAL THERAPIST

## 2024-10-25 NOTE — PROGRESS NOTES
"Daily Note     Today's date: 10/25/2024  Patient name: Tian Garcia  : 1950  MRN: [id number]  Referring provider: Deepak Crane*  Dx:   Encounter Diagnosis     ICD-10-CM    1. S/P total knee arthroplasty, left  Z96.652                      Subjective: Pt reports being very sore today. Took a long drive yesterday and did a lot of yard work the day before. Feeling frustrated.       Objective: See treatment diary below      Assessment: Attempted hurdles this session but patientw as not able to complete without hand hold on railings. Overall pt knee felt irritated with mst activities and he was slow to complete activities. Plan next session to attempt to progress if able. Utilized reumbent bike this session to improve ROM in knee.       Plan: Continue per plan of care.      Short Term Goal Expiration Date:(24)  Long Term Goal Expiration Date: (24)  POC Expiration Date: (24)      POC expires Unit limit Auth Expiration date PT/OT/ST + Visit Limit?                                 Visit/Unit Tracking  AUTH Status:  Date 10/7 10/11 10/14 10/18 10/22 10/23         Used 2 3 4 5 6 7         Remaining      2 1              Precautions        Manuals 10/25  10/14 10/18 10/22                                   Neuro Re-Ed         Step ups 8\" 10x eafwd/at  2x10 6 \"  step  10x 8\" step fwd/lat with UE    Lateral step ups   2x10 6 \" step     Modified lunge   2x10 with forward foot on 6\" step    Forward lunge in parallel bars 4 x 8ft with UE   Mini squats   2x10     Blue foam balance and ball toss   60 sec x3 sets      hurdles Fwd 4 laps and lateral 3 laps               Ther Ex        SLR    10x 2 L LE    S/l hip abd    10x2     bridges        Heel slide         Heel raise   3x10 30x 30x   Stdg Hip Abd   2x10     Stdg Hip Ext   2x10        Recumbent bike 10 min L1    8 min to improve ROM     treadmill     10 min 1.2 mph Prn UE    LAQ    2x10 L LE    TKE Gtb 2x10     2x10 b/l LE with green TB   Ther " Activity                        Gait Training   100 feet speed walking w/o SPC                     Modalities                                     Start Time: 0930  Stop Time: 1015  Total time in clinic (min): 45 minutes

## 2024-10-28 ENCOUNTER — APPOINTMENT (OUTPATIENT)
Dept: PHYSICAL THERAPY | Facility: CLINIC | Age: 74
End: 2024-10-28
Payer: COMMERCIAL

## 2024-10-29 ENCOUNTER — OFFICE VISIT (OUTPATIENT)
Dept: PHYSICAL THERAPY | Facility: CLINIC | Age: 74
End: 2024-10-29
Payer: COMMERCIAL

## 2024-10-29 DIAGNOSIS — Z96.652 S/P TOTAL KNEE ARTHROPLASTY, LEFT: Primary | ICD-10-CM

## 2024-10-29 PROCEDURE — 97112 NEUROMUSCULAR REEDUCATION: CPT | Performed by: PHYSICAL THERAPIST

## 2024-10-29 PROCEDURE — 97110 THERAPEUTIC EXERCISES: CPT | Performed by: PHYSICAL THERAPIST

## 2024-10-29 NOTE — PROGRESS NOTES
Progress Note     Today's date: 10/29/2024  Patient name: Tian Garcia  : 1950  MRN: [id number]  Referring provider: Deepak Crane*  Dx:   Encounter Diagnosis     ICD-10-CM    1. S/P total knee arthroplasty, left  Z96.652                      Subjective: Patient reports feeling tired and sore today, had a busy morning      Objective: See treatment diary below    Outcomes:  Knee flexion: 114 degrees   Knee extension: -2  5x sts: 14 sec with UE; without UE only to 2 repetitions then painful   TU sec without AD      Assessment: Patient reports to physical therapy this date for re-assessment for further visit authorization. At this time patient has made progress with quadriceps strength in left LE, improved knee range of motion, is able to walk within home and community with SPC rather than walker and overall is improving with indepenence and mobility. However, he remains a high risk of falling, has weakness in bilateral hip abductors, has dereased quadriceps recruitment, and struggles to complete sit to stand transfer without upper extremity assistance. Pt will continue to benefit from skilled PT at 2x/week for 4 more weeks.       Goals:  Pt will be able to ambulate around home and community with SPC in 4 weeks  Pt will have 115 degrees knee flexion ROM in 4 weeks  Pt will improve knee extension recruitment to 4/5 MMT within 4 weeks    LTG  Pt will be below fall risk cut offs within 8 weeks  Pt will walk at 1.0 m/s within 8 weeks  Pt will be able to complete STS transfer 5 imes in a row wihtout UE in 8 weeks    Plan: Continue per plan of care.      Short Term Goal Expiration Date:(24)  Long Term Goal Expiration Date: (24)  POC Expiration Date: (24)      POC expires Unit limit Auth Expiration date PT/OT/ST + Visit Limit?                                 Visit/Unit Tracking  AUTH Status:  Date 10/7 10/11 10/14 10/18 10/22 10/23 10/29        Used 2 3 4 5 6 7 8        Remaining      2  "1 0             Precautions        Manuals 10/25 10/28 10/14 10/18 10/22                                   Neuro Re-Ed         Step ups 8\" 10x eafwd/at 8\" 10x ea fwd/lat 2x10 6 \"  step  10x 8\" step fwd/lat with UE    Lateral step ups   2x10 6 \" step     Modified lunge   2x10 with forward foot on 6\" step    Forward lunge in parallel bars 4 x 8ft with UE   Mini squats   2x10     Blue foam balance and ball toss   60 sec x3 sets      hurdles Fwd 4 laps and lateral 3 laps Fwd 4# aw 4 laps               Ther Ex        SLR    10x 2 L LE    S/l hip abd    10x2     bridges        Heel slide         Heel raise   3x10 30x 30x   Stdg Hip Abd   2x10     Stdg Hip Ext   2x10        Recumbent bike 10 min L1    8 min to improve ROM     treadmill  5 min at 1.4 mph b/l UE   10 min 1.2 mph Prn UE    LAQ    2x10 L LE    TKE Gtb 2x10     2x10 b/l LE with green TB   Ther Activity                        Gait Training   100 feet speed walking w/o SPC                     Modalities                                     Start Time: 0930  Stop Time: 1015  Total time in clinic (min): 45 minutes                           "

## 2024-10-30 ENCOUNTER — ANTICOAG VISIT (OUTPATIENT)
Dept: FAMILY MEDICINE CLINIC | Facility: HOSPITAL | Age: 74
End: 2024-10-30

## 2024-10-30 ENCOUNTER — TELEPHONE (OUTPATIENT)
Age: 74
End: 2024-10-30

## 2024-10-30 DIAGNOSIS — I48.0 PAROXYSMAL A-FIB (HCC): ICD-10-CM

## 2024-10-30 RX ORDER — WARFARIN SODIUM 5 MG/1
TABLET ORAL
Qty: 58 TABLET | Refills: 1 | Status: SHIPPED | OUTPATIENT
Start: 2024-10-30

## 2024-10-30 NOTE — TELEPHONE ENCOUNTER
Medication: warfarin (COUMADIN) 5 mg tablet    Dose/Frequency:   TAKE ONE-HALF TABLET BY MOUTH DAILY , EXCEPT TAKE 1 TABLET BY MOUTH ON WEDNESDAY AND SATURDAY        Quantity: 58    Pharmacy: [id number] - [place] - [address]     Office:   [x] PCP/Provider -   [] Speciality/Provider -     Does the patient have enough for 3 days?   [] Yes   [x] No - Send as HP to POD   NO LONGER USES [facility]

## 2024-10-30 NOTE — TELEPHONE ENCOUNTER
Caller: Patient     Doctor: Mateo     Reason for call: Patient felt a pop on her knee and is in alot of pain, call was warm transferred to nurse

## 2024-10-31 NOTE — TELEPHONE ENCOUNTER
Pt called back following up from his phone call yesterday in regards to his knee popping. Warm transferred to Nurse Cason

## 2024-10-31 NOTE — TELEPHONE ENCOUNTER
Spoke to patient, denies VM from NN team- we discussed rest, ice and elevation. He has been icing, no improvement.     We discussed if no improvement to call me and we will move up appt for eval.

## 2024-11-01 ENCOUNTER — OFFICE VISIT (OUTPATIENT)
Dept: PHYSICAL THERAPY | Facility: CLINIC | Age: 74
End: 2024-11-01
Payer: COMMERCIAL

## 2024-11-01 DIAGNOSIS — Z96.652 S/P TOTAL KNEE ARTHROPLASTY, LEFT: Primary | ICD-10-CM

## 2024-11-01 PROCEDURE — 97110 THERAPEUTIC EXERCISES: CPT | Performed by: PHYSICAL THERAPIST

## 2024-11-01 PROCEDURE — 97112 NEUROMUSCULAR REEDUCATION: CPT | Performed by: PHYSICAL THERAPIST

## 2024-11-01 NOTE — PROGRESS NOTES
"Daily Note     Today's date: 2024  Patient name: Tian Garcia  : 1950  MRN: [id number]  Referring provider: Deepak Crane*  Dx:   Encounter Diagnosis     ICD-10-CM    1. S/P total knee arthroplasty, left  Z96.652                      Subjective: Pt has been very painful since last session as he felt a \"pop\" when doing sit to stands at end of last session. Called MD who told him that in PT we would address.       Objective: See treatment diary below      Assessment:   Pt has noted swelling, bruising, or significant loss of strength noted in knee. Pt was more sore during weight bearing of L LE today compared to last session. Reduced some exercise repetitions but overall Pt was able to complete activities well.     Plan: Continue per plan of care.      Short Term Goal Expiration Date:(24)  Long Term Goal Expiration Date: (24)  POC Expiration Date: (24)      POC expires Unit limit Auth Expiration date PT/OT/ST + Visit Limit?                                 Visit/Unit Tracking  AUTH Status:  Date 10/7 10/11 10/14 10/18 10/22 10/23 10/29 11/1       Used 2 3 4 5 6 7 8 Waiting on auth       Remaining      2 1 0             Precautions        Manuals 10/25 10/28 11/1 10/18 10/22                                   Neuro Re-Ed         Step ups 8\" 10x eafwd/at 8\" 10x ea fwd/lat 6\" step 10x ea  10x 8\" step fwd/lat with UE    Lateral step ups        Modified lunge   10x ea LE  Forward lunge in parallel bars 4 x 8ft with UE   Side stepping   4 laps GTB at knees     Blue foam balance and ball toss        hurdles Fwd 4 laps and lateral 3 laps Fwd 4# aw 4 laps               Ther Ex        SLR    10x 2 L LE    S/l hip abd    10x2     bridges        Heel slide         Heel raise   30x 30x 30x   Stdg Hip Abd        Stdg Hip Ext        Recumbent bike 10 min L1    8 min to improve ROM     treadmill  5 min at 1.4 mph b/l UE 5 min at 1.4 mph b/l UE  10 min 1.2 mph Prn UE    LAQ    2x10 L LE    TKE Gtb " 2x10   GTB 2x10  2x10 b/l LE with green TB   Ther Activity                        Gait Training                        Modalities                                     Start Time: 0930  Stop Time: 1015  Total time in clinic (min): 45 minutes

## 2024-11-05 ENCOUNTER — OFFICE VISIT (OUTPATIENT)
Dept: PHYSICAL THERAPY | Facility: CLINIC | Age: 74
End: 2024-11-05
Payer: COMMERCIAL

## 2024-11-05 DIAGNOSIS — G47.00 INSOMNIA, UNSPECIFIED TYPE: ICD-10-CM

## 2024-11-05 DIAGNOSIS — Z96.652 S/P TOTAL KNEE ARTHROPLASTY, LEFT: Primary | ICD-10-CM

## 2024-11-05 PROCEDURE — 97110 THERAPEUTIC EXERCISES: CPT | Performed by: PHYSICAL THERAPIST

## 2024-11-05 PROCEDURE — 97112 NEUROMUSCULAR REEDUCATION: CPT | Performed by: PHYSICAL THERAPIST

## 2024-11-05 NOTE — PROGRESS NOTES
"Daily Note     Today's date: 2024  Patient name: Tian Garcia  : 1950  MRN: [id number]  Referring provider: Deepak Crane*  Dx:   Encounter Diagnosis     ICD-10-CM    1. S/P total knee arthroplasty, left  Z96.652             Start Time: 1445  Stop Time: 1530  Total time in clinic (min): 45 minutes        Subjective: Patient reported that he is still experiencing pain in his knee from the previous two sessions and is still having pain when getting up from a chair.    Objective: See treatment diary below      Assessment: Patient tolerated treatment well today evident of decreasing pain from start of session to end of session. Squats were added today and the patient reported that the movement helped decrease pain and Lossen up his knee. This was given to do at home when he is experiencing the stiffness. Patient did have pain in the left knee with right sideline abduction but was better once the left leg was straighten out. Patient is a good candidate for skilled PT to continue to work on lower extremity strength, ROM, and balance.      Plan: Continue per plan of care.      Short Term Goal Expiration Date:(24)  Long Term Goal Expiration Date: (24)  POC Expiration Date: (24)      POC expires Unit limit Auth Expiration date PT/OT/ST + Visit Limit?                                 Visit/Unit Tracking  AUTH Status:  Date 10/7 10/11 10/14 10/18 10/22 10/23 10/29 11/1 11/5      Used 2 3 4 5 6 7 8 Waiting on auth       Remaining      2 1 0             Precautions        Manuals 10/25 10/28 11/1 11/5 10/18 10/22                                       Neuro Re-Ed          Step ups 8\" 10x eafwd/at 8\" 10x ea fwd/lat 6\" step 10x ea   10x 8\" step fwd/lat with UE    Lateral step ups         Modified lunge   10x ea LE   Forward lunge in parallel bars 4 x 8ft with UE   Side stepping   4 laps GTB at knees      Blue foam balance and ball toss         hurdles Fwd 4 laps and lateral 3 laps Fwd 4# aw 4 " laps        Squats    2x10     Tandem walk w/ TD carry         Side step on balance beam         Ther Ex         SLR     10x 2 L LE    S/l hip abd    2x10 w/ #2 w/ 3 sec hold 10x2     bridges         Heel slide          Heel raise   30x  30x 30x   Stdg Hip Abd         Stdg Hip Ext         Recumbent bike 10 min L1     8 min to improve ROM     treadmill  5 min at 1.4 mph b/l UE 5 min at 1.4 mph b/l UE 5 min flat  5 min incline  1.5 mph   10 min 1.2 mph Prn UE    LAQ     2x10 L LE    TKE Gtb 2x10   GTB 2x10 GTB 2x10  2x10 b/l LE with green TB   Ther Activity                           Gait Training                           Modalities                                        Start Time: 0930  Stop Time: 1015  Total time in clinic (min): 45 minutes

## 2024-11-06 RX ORDER — ZOLPIDEM TARTRATE 10 MG/1
10 TABLET ORAL
Qty: 90 TABLET | Refills: 0 | Status: SHIPPED | OUTPATIENT
Start: 2024-11-06

## 2024-11-08 ENCOUNTER — OFFICE VISIT (OUTPATIENT)
Dept: CARDIOLOGY CLINIC | Facility: CLINIC | Age: 74
End: 2024-11-08
Payer: COMMERCIAL

## 2024-11-08 ENCOUNTER — OFFICE VISIT (OUTPATIENT)
Dept: PHYSICAL THERAPY | Facility: CLINIC | Age: 74
End: 2024-11-08
Payer: COMMERCIAL

## 2024-11-08 VITALS
WEIGHT: 228 LBS | HEIGHT: 71 IN | BODY MASS INDEX: 31.92 KG/M2 | SYSTOLIC BLOOD PRESSURE: 104 MMHG | DIASTOLIC BLOOD PRESSURE: 72 MMHG | HEART RATE: 60 BPM

## 2024-11-08 DIAGNOSIS — I10 PRIMARY HYPERTENSION: ICD-10-CM

## 2024-11-08 DIAGNOSIS — Z96.652 S/P TOTAL KNEE ARTHROPLASTY, LEFT: Primary | ICD-10-CM

## 2024-11-08 DIAGNOSIS — I25.10 ATHEROSCLEROSIS OF NATIVE CORONARY ARTERY OF NATIVE HEART WITHOUT ANGINA PECTORIS: Primary | ICD-10-CM

## 2024-11-08 DIAGNOSIS — I48.0 PAROXYSMAL A-FIB (HCC): ICD-10-CM

## 2024-11-08 DIAGNOSIS — I25.10 CORONARY ARTERY DISEASE INVOLVING NATIVE CORONARY ARTERY OF NATIVE HEART WITHOUT ANGINA PECTORIS: Chronic | ICD-10-CM

## 2024-11-08 DIAGNOSIS — I25.5 ISCHEMIC CARDIOMYOPATHY: ICD-10-CM

## 2024-11-08 PROCEDURE — 97112 NEUROMUSCULAR REEDUCATION: CPT | Performed by: PHYSICAL THERAPIST

## 2024-11-08 PROCEDURE — 97110 THERAPEUTIC EXERCISES: CPT | Performed by: PHYSICAL THERAPIST

## 2024-11-08 PROCEDURE — 99214 OFFICE O/P EST MOD 30 MIN: CPT | Performed by: INTERNAL MEDICINE

## 2024-11-08 RX ORDER — METOPROLOL SUCCINATE 25 MG/1
25 TABLET, EXTENDED RELEASE ORAL DAILY
Qty: 90 TABLET | Refills: 3 | Status: SHIPPED | OUTPATIENT
Start: 2024-11-08

## 2024-11-08 RX ORDER — TORSEMIDE 20 MG/1
20 TABLET ORAL DAILY
Qty: 90 TABLET | Refills: 3 | Status: SHIPPED | OUTPATIENT
Start: 2024-11-08

## 2024-11-08 NOTE — PROGRESS NOTES
Cardiology Outpatient Follow-Up Note - Tian Garcia 74 y.o. male MRN: [id number]      Assessment/Plan:    1. Atherosclerosis of native coronary artery of native heart without angina pectoris  No angina  Continue aspirin 81 mg daily  Continue atorvastatin 80 mg daily   Continue metoprolol XL 25 mg -- reduced to daily due to lightheadedness    2. Ischemic cardiomyopathy  Continue metoprolol XL 25 mg daily and lisinopril 5 mg daily  Recheck echo, last LVEF measured as 37%  - metoprolol succinate (TOPROL-XL) 25 mg 24 hr tablet; Take 1 tablet (25 mg total) by mouth daily  Dispense: 90 tablet; Refill: 3    3. Paroxysmal A-fib (HCC)  Had RVR recently post op with increase in metoprolol XL which he is not tolerating. Reduce metoprolol XL to 25 mg daily again.   On anticoagulation with warfarin per patient preference.   - metoprolol succinate (TOPROL-XL) 25 mg 24 hr tablet; Take 1 tablet (25 mg total) by mouth daily  Dispense: 90 tablet; Refill: 3    4. Primary hypertension  Controlled on current regimen.     5. Coronary artery disease involving native coronary artery of native heart without angina pectoris  As above  - metoprolol succinate (TOPROL-XL) 25 mg 24 hr tablet; Take 1 tablet (25 mg total) by mouth daily  Dispense: 90 tablet; Refill: 3        We will see Tian Garcia back in 6 month for routine follow-up.    Subjective:     HPI: iTan Garcia is a 74 y.o. year old male  with paroxysmal atrial fibrillation, HFrEF 2/2 ICMP, hypertension, moderate aortic stenosis, CKD 4, CAD and STEMI April 2023 with PCI to the proximal to mid LAD.   He presents today for follow-up.    Was in the hospital for TKA and had post-op AF with RVR. His metoprolol XL was increased from 25 mg daily to 25 mg BID. Since then he has been feeling more lightheaded.     Cardiac Testing:    Echo 8/12/24  Interpretation Summary  Show Result Comparison     Left Ventricle: Left ventricular cavity size is moderately dilated. Wall thickness is  normal. The left ventricular ejection fraction is 37% by biplane measurement. Systolic function is normal. There is moderate global hypokinesis.    IVS: Septal motion is not well visualized.    Right Ventricle: Right ventricular cavity size is mildly dilated. Systolic function is normal.    Left Atrium: The atrium is moderately dilated (42-48 mL/m2).    Right Atrium: The atrium is mildly dilated.    Aortic Valve: The aortic valve is trileaflet. The leaflets are moderately thickened. The leaflets are moderately calcified. There is moderately reduced mobility. There is mild regurgitation. There is moderate stenosis. The aortic valve peak velocity is 2.36 m/s. The aortic valve mean gradient is 13 mmHg. The dimensionless velocity index is 0.32. The aortic valve area is 1.32 cm2. The stroke volume index is 24.20 ml/m2. The aortic valve velocity is increased due to stenosis but lower than expected due to the presence of decreased flow.    Mitral Valve: There is mild annular calcification. There is mild regurgitation.    Prior TTE study available for comparison. Prior study date: 6/9/2023. Changes noted when compared to prior study. Changes include: LVEF is now moderately reduced. .        Echo 6/9/23     Interpretation Summary  Show Result Comparison     Left Ventricle: Left ventricular cavity size is normal. The left ventricular ejection fraction is 55-60% by biplane measurement. Systolic function is normal.    The following segments are hypokinetic: basal inferior.    All other segments are normal.    Aortic Valve: There is moderate stenosis.     Marked recovery of LV systolic function compared to prior study dated 04/10/2022.           Cardiac Cath 4/9/23 Impression         Successful PCI w/ non-overlapping ROBERT x2 of sequential prox & mid LAD culprit lesions    Residual mid RCA 80% diffuse lesion    Echo 4/10/23 Interpretation Summary         Left Ventricle: Left ventricular cavity size is normal. Wall thickness is  moderately increased. There is moderate concentric hypertrophy. The left ventricular ejection fraction is 30-35%. Global longitudinal strain is reduced at -6%. There is moderate global hypokinesis with severe hypokinesis of the mid anterior, mid anteroseptal, mid anterolateral, and all apical segments. There is regional variation of the inferior wall. Diastolic function is moderately abnormal, consistent with grade II (pseudonormal) relaxation.    Right Ventricle: Wall motion is abnormal. There is severe hypokinesis of the apical free wall.    Mitral Valve: There is mild regurgitation.    Aortic Valve: There is moderate stenosis. The aortic valve velocity is increased due to stenosis but lower than expected due to the presence of decreased flow.       Echo 6/9/23  Interpretation Summary     Left Ventricle: Left ventricular cavity size is normal. The left ventricular ejection fraction is 55-60% by biplane measurement. Systolic function is normal.    The following segments are hypokinetic: basal inferior.    All other segments are normal.    Aortic Valve: There is moderate stenosis.     Marked recovery of LV systolic function compared to prior study dated 04/10/2022.          EKGs, personally reviewed:  EKG in the office 9/28/2023 shows sinus bradycardia, 58 bpm, normal axis,  ms RBBB, otherwise normal intervals.  Abnormal study.    EKG 2/28/24 - sinus bradycardia, 52 bpm, normal axis, RBBB  ms. Abnormal study.     7/3/24 - atrial fibrillation, 82 bpm, RBBB, nonspecific T wave abnormalities, abnormal study    Relevant Labs & Results:  CMP 5/4/23 --  Cr 2.5, K 4.1  Lipid panel 4/9/23 -- , , HDL 45,  mg/dL -- on pravastatin 40 mg   LDL direct 4/9/23 -- 139 mg/dL     CMP 2/26/24 - Cr 2.17, GFR 31, K 4.2  Lipid panel 10/10/23 - LDL 90 mg/dL   CBC 1/3/24 - Hgb 12.7 g/dL      CMP 8/12/24 - K 4.8, Cr 2.09    ROS:  Review of Systems:  Review of Systems    Objective:     Vitals:   Vitals:     "11/08/24 1301   Weight: 103 kg (228 lb)   Height: 5' 11\" (1.803 m)      Body surface area is 2.23 meters squared.  Wt Readings from Last 3 Encounters:   11/08/24 103 kg (228 lb)   10/08/24 99.3 kg (219 lb)   09/10/24 99.3 kg (219 lb)       Physical Exam:    General: Tian Garcia is a well appearing male, in no acute distress, sitting comfortably  HEENT: moist mucous membranes, EOMI  Neck:  No JVD, supple, trachea midline  Cardiovascular: unremarkable S1/S2, irregular rhythm,  3/6 SM RUSB  Pulmonary: normal respiratory effort, CTAB  Abdomen: soft and nondistended  Extremities: trace lower extremity edema. Warm and well perfused extremities.  Neuro: no focal motor deficits, AAOx3 (person, place, time)  Psych: Normal mood and affect, cooperative      Medications (at the START of this encounter):  Outpatient Medications Prior to Visit   Medication Sig Dispense Refill    ascorbic acid (VITAMIN C) 500 MG tablet Take 1 tablet (500 mg total) by mouth 2 (two) times a day 60 tablet 1    Aspirin 81 MG CAPS Take by mouth      atorvastatin (LIPITOR) 80 mg tablet TAKE 1 TABLET BY MOUTH EVERY EVENING 90 tablet 1    DULoxetine (CYMBALTA) 60 mg delayed release capsule TAKE 1 CAPSULE BY MOUTH EVERY DAY 90 capsule 1    ezetimibe (ZETIA) 10 mg tablet TAKE 1 TABLET BY MOUTH EVERY DAY 90 tablet 3    gabapentin (NEURONTIN) 300 mg capsule TAKE 1 CAPSULE BY MOUTH  DAILY AT BEDTIME 90 capsule 1    glucose blood (Accu-Chek Guide) test strip Use one new strip daily dx r73.01 100 strip 1    lisinopril (ZESTRIL) 5 mg tablet Take 1 tablet (5 mg total) by mouth daily 90 tablet 3    metoprolol succinate (TOPROL-XL) 25 mg 24 hr tablet Take 1 tablet (25 mg total) by mouth 2 (two) times a day 90 tablet 0    potassium chloride (Klor-Con M20) 20 mEq tablet Take 1 tablet (20 mEq total) by mouth daily 90 tablet 3    torsemide (DEMADEX) 20 mg tablet TAKE 1 AND 1/2 TABLETS ALTERNATING WITH 1 TABLET DAILY AS DIRECTED (Patient taking differently: Take 10 mg " "by mouth PT taking 1/2 tablet daily (10mg) usually) 135 tablet 1    warfarin (COUMADIN) 5 mg tablet TAKE ONE-HALF TABLET BY MOUTH  DAILY , EXCEPT TAKE 1 TABLET BY  MOUTH ON WEDNESDAY AND SATURDAY 58 tablet 1    zolpidem (AMBIEN) 10 mg tablet TAKE 1 TABLET BY MOUTH DAILY AT  BEDTIME AS NEEDED FOR SLEEP 90 tablet 0    mometasone (ELOCON) 0.1 % cream APPLY TO AFFECTED AREA TOPICALLY EVERY DAY 45 g 1    nitroglycerin (NITROSTAT) 0.4 mg SL tablet Place 1 tablet (0.4 mg total) under the tongue every 5 (five) minutes as needed for chest pain (Patient not taking: Reported on 10/8/2024) 30 tablet 0    tamsulosin (FLOMAX) 0.4 mg Take 1 capsule (0.4 mg total) by mouth daily with dinner 90 capsule 1    tirzepatide (Mounjaro) 2.5 MG/0.5ML INJECT 0.5 ML (2.5 MG TOTAL) UNDER THE SKIN EVERY 7 DAYS (Patient not taking: Reported on 11/8/2024) 2 mL 1    docusate sodium (COLACE) 100 mg capsule Take 1 capsule (100 mg total) by mouth 2 (two) times a day (Patient not taking: Reported on 10/8/2024) 10 capsule 0    ferrous sulfate 324 (65 Fe) mg TAKE 1 TABLET (324 MG TOTAL) BY MOUTH DAILY BEFORE BREAKFAST (Patient not taking: Reported on 10/8/2024) 90 tablet 1    folic acid (FOLVITE) 1 mg tablet Take 1 tablet (1 mg total) by mouth daily 30 tablet 1    ondansetron (ZOFRAN) 4 mg tablet Take 1 tablet (4 mg total) by mouth every 8 (eight) hours as needed for nausea or vomiting 5 tablet 0     No facility-administered medications prior to visit.           This note was completed in part utilizing Dragon [employer] voice recognition software. Grammatical errors, random word insertion, spelling mistakes, occasional wrong word or \"sound-alike\" substitutions and incomplete sentences may be an occasional consequence of the system secondary to software limitations, ambient noise and hardware issues. At the time of dictation, efforts were made to edit, clarify and /or correct errors.  Please read the chart carefully and recognize, using context, where " substitutions have occurred.  If you have any questions or concerns about the context, text or information contained within the body of this dictation, please contact myself, the provider, for further clarification.

## 2024-11-08 NOTE — PROGRESS NOTES
"Daily Note     Today's date: 2024  Patient name: Tian Garcia  : 1950  MRN: [id number]  Referring provider: Deepak Crane*  Dx:   Encounter Diagnosis     ICD-10-CM    1. S/P total knee arthroplasty, left  Z96.652                      Subjective:   Patient reports to therapy this date with no new changes to report    Objective: See treatment diary below      Assessment: Patient again felt a strong pulling and \"popping\" sensation in his knee when doing sit to stands and felt a lot of pain in his left knee. He had no significant increas ein pain with any other activities. Encouraged pt to keep his knee moving and continue to ice.       Plan: Continue per plan of care.      Short Term Goal Expiration Date:(24)  Long Term Goal Expiration Date: (24)  POC Expiration Date: (24)      POC expires Unit limit Auth Expiration date PT/OT/ST + Visit Limit?                                 Visit/Unit Tracking  AUTH Status:  Date 10/7 10/11 10/14 10/18 10/22 10/23 10/29 11/1 11/5 11/8     Used 2 3 4 5 6 7 8 Waiting on auth  3     Remaining      2 1 0   5          Precautions        Manuals 10/25 10/28 11/1 11/5 11/8 10/22                                       Neuro Re-Ed          Step ups 8\" 10x eafwd/at 8\" 10x ea fwd/lat 6\" step 10x ea   10x 8\" step fwd/lat with UE    Lateral step ups         Modified lunge   10x ea LE  4 laps  Forward lunge in parallel bars 4 x 8ft with UE   Side stepping   4 laps GTB at knees  4 laps gtb at knees    Blue foam balance and ball toss         hurdles Fwd 4 laps and lateral 3 laps Fwd 4# aw 4 laps        Squats    2x10     Tandem walk w/ TD carry         Side step on balance beam         Ther Ex         SLR         S/l hip abd    2x10 w/ #2 w/ 3 sec hold     bridges         Heel slide          Heel raise   30x  30x 30x   Stdg Hip Abd         Stdg Hip Ext         Recumbent bike 10 min L1         treadmill  5 min at 1.4 mph b/l UE 5 min at 1.4 mph b/l UE 5 min " flat  5 min incline  1.5 mph  10 min 1.4 mph 10 min 1.2 mph Prn UE    LAQ         TKE Gtb 2x10   GTB 2x10 GTB 2x10  2x10 b/l LE with green TB   Ther Activity                           Gait Training                           Modalities                                        Start Time: 0930  Stop Time: 1015  Total time in clinic (min): 45 minutes

## 2024-11-11 ENCOUNTER — TELEPHONE (OUTPATIENT)
Age: 74
End: 2024-11-11

## 2024-11-11 NOTE — TELEPHONE ENCOUNTER
"Caller: Self    Doctor: Mateo    Reason for call: Patient reports feeling the \"pop\" again during last last physical therapy session on 11/8. States it happened when he stepped up on chair. He would like to get in for an appt at this point to see what is happening. He wants to know if he should be going to therapy or hold off for a while or avoid certain exercises? Please adivse    Call back#: [phone]  "

## 2024-11-11 NOTE — TELEPHONE ENCOUNTER
Hello,    Please advise if a forced appointment can be accommodated for the patient:    Call back #: [phone]    Insurance: BC    Reason for appointment: Patient is experiencing increasing pain. Per Deepak in previous note, have patient seen for Xray and follow up. Please call patient to schedule.    Requested doctor and/or location: Mateo / [facility] or [facility]      Thank you.

## 2024-11-12 ENCOUNTER — APPOINTMENT (OUTPATIENT)
Dept: PHYSICAL THERAPY | Facility: CLINIC | Age: 74
End: 2024-11-12
Payer: COMMERCIAL

## 2024-11-12 ENCOUNTER — HOSPITAL ENCOUNTER (OUTPATIENT)
Dept: RADIOLOGY | Facility: HOSPITAL | Age: 74
Discharge: HOME | End: 2024-11-12
Payer: COMMERCIAL

## 2024-11-12 ENCOUNTER — OFFICE VISIT (OUTPATIENT)
Dept: OBGYN CLINIC | Facility: CLINIC | Age: 74
End: 2024-11-12

## 2024-11-12 VITALS — WEIGHT: 228 LBS | BODY MASS INDEX: 31.92 KG/M2 | HEIGHT: 71 IN

## 2024-11-12 DIAGNOSIS — Z96.652 S/P TOTAL KNEE ARTHROPLASTY, LEFT: Primary | ICD-10-CM

## 2024-11-12 DIAGNOSIS — Z96.652 S/P TOTAL KNEE REPLACEMENT USING CEMENT, LEFT: ICD-10-CM

## 2024-11-12 DIAGNOSIS — Z96.652 S/P TOTAL KNEE REPLACEMENT USING CEMENT, LEFT: Primary | ICD-10-CM

## 2024-11-12 PROCEDURE — 99024 POSTOP FOLLOW-UP VISIT: CPT | Performed by: PHYSICIAN ASSISTANT

## 2024-11-12 PROCEDURE — 73562 X-RAY EXAM OF KNEE 3: CPT

## 2024-11-12 RX ORDER — METHOCARBAMOL 500 MG/1
500 TABLET, FILM COATED ORAL 3 TIMES DAILY
Qty: 30 TABLET | Refills: 0 | Status: SHIPPED | OUTPATIENT
Start: 2024-11-12

## 2024-11-12 NOTE — PROGRESS NOTES
"Daily Note     Today's date: 2024  Patient name: Tian Garcia  : 1950  MRN: [id number]  Referring provider: Deepak Crane*  Dx:   No diagnosis found.                 Subjective: Patient reported      Objective: See treatment diary below      Assessment: Patient tolerated treatment well. Patient is a good candiate for skilled PT to work on lower extremity strengthening, ROM, and balance.      Plan: Continue per plan of care.      Short Term Goal Expiration Date:(24)  Long Term Goal Expiration Date: (24)  POC Expiration Date: (24)      POC expires Unit limit Auth Expiration date PT/OT/ST + Visit Limit?                                 Visit/Unit Tracking  AUTH Status:  Date 10/7 10/11 10/14 10/18 10/22 10/23 10/29 11/1 11/5 11/8 11/11    Used 2 3 4 5 6 7 8 Waiting on auth  3 4    Remaining      2 1 0   5 4         Precautions        Manuals 10/25 10/28 11/1 11/5 11/8 11/11 10/22                                           Neuro Re-Ed           Step ups 8\" 10x eafwd/at 8\" 10x ea fwd/lat 6\" step 10x ea   x 10x 8\" step fwd/lat with UE    Lateral step ups          Modified lunge   10x ea LE  4 laps  x Forward lunge in parallel bars 4 x 8ft with UE   Side stepping   4 laps GTB at knees  4 laps gtb at knees     Blue foam balance and ball toss          hurdles Fwd 4 laps and lateral 3 laps Fwd 4# aw 4 laps         Squats    2x10      Tandem walk w/ TD carry          Side step on balance beam          Circuit training      - 10 Squats w/ TT  - Monster walks  - Side step on beam  - w/ green tband    Ther Ex          SLR          S/l hip abd    2x10 w/ #2 w/ 3 sec hold      bridges          Heel slide           Heel raise   30x  30x  30x   Stdg Hip Abd          Stdg Hip Ext          Recumbent bike 10 min L1          treadmill  5 min at 1.4 mph b/l UE 5 min at 1.4 mph b/l UE 5 min flat  5 min incline  1.5 mph  10 min 1.4 mph w/ #4 AW X 1.5 mph, incline 10 min 1.2 mph Prn UE    LAQ        "   TKE Gtb 2x10   GTB 2x10 GTB 2x10  x 2x10 b/l LE with green TB   Ther Activity                              Gait Training                              Modalities

## 2024-11-13 ENCOUNTER — ANTICOAG VISIT (OUTPATIENT)
Dept: FAMILY MEDICINE CLINIC | Facility: HOSPITAL | Age: 74
End: 2024-11-13

## 2024-11-13 ENCOUNTER — RESULTS FOLLOW-UP (OUTPATIENT)
Dept: FAMILY MEDICINE CLINIC | Facility: HOSPITAL | Age: 74
End: 2024-11-13

## 2024-11-13 LAB
CHOLEST SERPL-MCNC: 127 MG/DL (ref 100–199)
HDLC SERPL-MCNC: 58 MG/DL
LDLC SERPL CALC-MCNC: 54 MG/DL (ref 0–99)
LDLC/HDLC SERPL: 0.9 RATIO (ref 0–3.6)
SL AMB VLDL CHOLESTEROL CALC: 15 MG/DL (ref 5–40)
TRIGL SERPL-MCNC: 72 MG/DL (ref 0–149)

## 2024-11-15 ENCOUNTER — APPOINTMENT (OUTPATIENT)
Dept: PHYSICAL THERAPY | Facility: CLINIC | Age: 74
End: 2024-11-15
Payer: COMMERCIAL

## 2024-11-15 NOTE — PROGRESS NOTES
74 y.o.male presents for 6 weeks postoperative visit status post right TKA. Patient denies any chest pain, shortness or breath or calf pain . Pain is well controlled with medication.  Patient states he noted a click and pain after 1 physical therapy visit he states the pain was relieved however he did notice a click several days ago he states is resolved however he is nervous about restarting physical therapy he denies any decrease in range of motion denies any calf pain chest pain shortness of breath otherwise.  Review of Systems  Review of systems negative unless otherwise specified in HPI    Past Medical History  Past Medical History:   Diagnosis Date    Acute venous embolism and thrombosis of deep vessels of proximal lower extremity ([facility])     Last assessed: 5/18/15    JANEL (acute kidney injury) ([facility]) 4/22/2022    Arthritis     Cellulitis     LE    Chronic kidney disease 03/2020    Acute Kidney Injury    CPAP (continuous positive airway pressure) dependence     Factor V Leiden ([facility])     Forgetfulness     Gross hematuria 05/17/2022    Headache(784.0) 03/2022    Never till cervical  spine surgery    Heart murmur 03/2020    Told when in hospital    History of transfusion     [place] (hard of hearing)     Hypoalbuminemia 05/11/2022    Other acute osteomyelitis, other site ([facility]) 01/03/2023    Paroxysmal atrial fibrillation ([facility])     Last assessed: 11/2/15    Sleep apnea        Past Surgical History  Past Surgical History:   Procedure Laterality Date    BACK SURGERY      Lower    CARDIAC CATHETERIZATION N/A 04/09/2023    Procedure: Cardiac pci;  Surgeon: Bird Cast MD;  Location: [facility];  Service: Cardiology    CARDIAC CATHETERIZATION N/A 04/09/2023    Procedure: Cardiac Coronary Angiogram;  Surgeon: Bird Cast MD;  Location: [facility];  Service: Cardiology    CATARACT EXTRACTION      COLON SURGERY      COLONOSCOPY      INCISION AND DRAINAGE POSTERIOR SPINE N/A 04/01/2022    Procedure:  Posterior cervical evacuation of postoperative collection and debridement with placement of drains C3-T1;  Surgeon: Rajeev Garcia MD;  Location: [facility];  Service: Neurosurgery    IR BIOPSY KIDNEY RANDOM  05/26/2022    IR TUNNELED DIALYSIS CATHETER PLACEMENT  05/23/2022    IR TUNNELED DIALYSIS CATHETER REMOVAL  06/02/2022    MT ARTHRD ANT INTERBODY MIN DSC CRV BELOW C2 N/A 03/11/2022    Procedure: Anterior cervical discectomy and fixation fusion C5/6 and C6/7; Posterior cervical decompression and instrumented fusion C3-T1;  Surgeon: Rajeev Garcia MD;  Location: [facility];  Service: Neurosurgery    MT ARTHRP KNE CONDYLE&PLATU MEDIAL&LAT COMPARTMENTS Right 4/8/2024    Procedure: ARTHROPLASTY KNEE TOTAL and all associated procedures;  Surgeon: Dot Galindo MD;  Location[facility];  Service: Orthopedics    MT ARTHRP KNE CONDYLE&PLATU MEDIAL&LAT COMPARTMENTS Left 9/23/2024    Procedure: ARTHROPLASTY KNEE TOTAL;  Surgeon: Dot Galindo MD;  [facility];  Service: Orthopedics    RENAL BIOPSY  6/2022    SPINE SURGERY  03/11/2022    Cervical myelopathy/ cervical fusion       Current Medications  Current Outpatient Medications on File Prior to Visit   Medication Sig Dispense Refill    ascorbic acid (VITAMIN C) 500 MG tablet Take 1 tablet (500 mg total) by mouth 2 (two) times a day 60 tablet 1    Aspirin 81 MG CAPS Take by mouth      atorvastatin (LIPITOR) 80 mg tablet TAKE 1 TABLET BY MOUTH EVERY EVENING 90 tablet 1    DULoxetine (CYMBALTA) 60 mg delayed release capsule TAKE 1 CAPSULE BY MOUTH EVERY DAY 90 capsule 1    ezetimibe (ZETIA) 10 mg tablet TAKE 1 TABLET BY MOUTH EVERY DAY 90 tablet 3    gabapentin (NEURONTIN) 300 mg capsule TAKE 1 CAPSULE BY MOUTH  DAILY AT BEDTIME 90 capsule 1    glucose blood (Accu-Chek Guide) test strip Use one new strip daily dx r73.01 100 strip 1    lisinopril (ZESTRIL) 5 mg tablet Take 1 tablet (5 mg total) by mouth daily 90 tablet 3    metoprolol  succinate (TOPROL-XL) 25 mg 24 hr tablet Take 1 tablet (25 mg total) by mouth daily 90 tablet 3    mometasone (ELOCON) 0.1 % cream APPLY TO AFFECTED AREA TOPICALLY EVERY DAY 45 g 1    nitroglycerin (NITROSTAT) 0.4 mg SL tablet Place 1 tablet (0.4 mg total) under the tongue every 5 (five) minutes as needed for chest pain (Patient not taking: Reported on 10/8/2024) 30 tablet 0    potassium chloride (Klor-Con M20) 20 mEq tablet Take 1 tablet (20 mEq total) by mouth daily 90 tablet 3    tamsulosin (FLOMAX) 0.4 mg Take 1 capsule (0.4 mg total) by mouth daily with dinner 90 capsule 1    tirzepatide (Mounjaro) 2.5 MG/0.5ML INJECT 0.5 ML (2.5 MG TOTAL) UNDER THE SKIN EVERY 7 DAYS (Patient not taking: Reported on 11/8/2024) 2 mL 1    torsemide (DEMADEX) 20 mg tablet Take 1 tablet (20 mg total) by mouth daily 90 tablet 3    warfarin (COUMADIN) 5 mg tablet TAKE ONE-HALF TABLET BY MOUTH  DAILY , EXCEPT TAKE 1 TABLET BY  MOUTH ON WEDNESDAY AND SATURDAY 58 tablet 1    zolpidem (AMBIEN) 10 mg tablet TAKE 1 TABLET BY MOUTH DAILY AT  BEDTIME AS NEEDED FOR SLEEP 90 tablet 0     No current facility-administered medications on file prior to visit.       Recent Labs (HCT,HGB,PT,INR,ESR,CRP,GLU,HgA1C)  0   Lab Value Date/Time    HCT 33.8 (L) 09/24/2024 0438    HCT 39.5 01/23/2015 0440    HGB 11.0 (L) 09/24/2024 0438    HGB 12.9 01/23/2015 0440    WBC 11.58 (H) 09/24/2024 0438    WBC 8.43 01/23/2015 0440    INR 2.9 (H) 11/12/2024 0946    INR 1.05 09/23/2024 1136    INR 2.2 (H) 01/11/2018 1405     (H) 05/10/2022 1949    CRP 8.2 (H) 05/10/2022 1950    GLUCOSE 102 (H) 10/25/2017 0951    HGBA1C 6.2 (H) 04/09/2023 1902    HGBA1C 6.2 (H) 11/02/2021 1302           Body mass index is 31.8 kg/m².  Wt Readings from Last 3 Encounters:   11/12/24 103 kg (228 lb)   11/08/24 103 kg (228 lb)   10/08/24 99.3 kg (219 lb)       Physical exam   General: Awake, Alert, Oriented   Eyes: Pupils equal, round and reactive to light    Heart: regular rate  and rhythm   Lungs: No audible wheezing   Abdomen: soft  right Lower extremity      Incision well approximated without erythema no tenderness to palpation    Full knee extension 100 of flexion   Active ankle dorsal and plantarflexion without pain, calf nontender palpation   sensation mildly decreased daniel-incisionally otherwise intact   Distal pulses present      Imaging  X rays of the right knee reviewed.  X rays reveal excellently aligned right total knee arthroplasty without evidence of loosening or osseous abnormality.    Assessment:  Status post 6 weeks right TKA    Plan:  Weight bearing as tolerated right Lower extremity  Advised patient to hold off on physical therapy for a few days and he may restart physical therapy in the next 2 to 3 weeks time  [employer] antibiotic prophylaxis recommended  Pain medication as needed  Follow-up in 2 months and repeat x-rays right knee

## 2024-11-18 ENCOUNTER — HOSPITAL ENCOUNTER (OUTPATIENT)
Dept: NON INVASIVE DIAGNOSTICS | Age: 74
Discharge: HOME | End: 2024-11-18
Payer: COMMERCIAL

## 2024-11-18 VITALS
WEIGHT: 228 LBS | SYSTOLIC BLOOD PRESSURE: 102 MMHG | HEIGHT: 71 IN | BODY MASS INDEX: 31.92 KG/M2 | DIASTOLIC BLOOD PRESSURE: 64 MMHG | HEART RATE: 124 BPM

## 2024-11-18 DIAGNOSIS — I48.0 PAROXYSMAL A-FIB (HCC): ICD-10-CM

## 2024-11-18 DIAGNOSIS — I25.5 ISCHEMIC CARDIOMYOPATHY: ICD-10-CM

## 2024-11-18 PROCEDURE — 93308 TTE F-UP OR LMTD: CPT | Performed by: INTERNAL MEDICINE

## 2024-11-18 PROCEDURE — 93308 TTE F-UP OR LMTD: CPT

## 2024-11-19 ENCOUNTER — OFFICE VISIT (OUTPATIENT)
Dept: PHYSICAL THERAPY | Facility: CLINIC | Age: 74
End: 2024-11-19
Payer: COMMERCIAL

## 2024-11-19 ENCOUNTER — RESULTS FOLLOW-UP (OUTPATIENT)
Dept: CARDIOLOGY CLINIC | Facility: CLINIC | Age: 74
End: 2024-11-19

## 2024-11-19 DIAGNOSIS — I25.10 CORONARY ARTERY DISEASE INVOLVING NATIVE CORONARY ARTERY OF NATIVE HEART WITHOUT ANGINA PECTORIS: Chronic | ICD-10-CM

## 2024-11-19 DIAGNOSIS — I48.0 PAROXYSMAL A-FIB (HCC): ICD-10-CM

## 2024-11-19 DIAGNOSIS — Z96.652 S/P TOTAL KNEE ARTHROPLASTY, LEFT: Primary | ICD-10-CM

## 2024-11-19 DIAGNOSIS — I35.0 NONRHEUMATIC AORTIC VALVE STENOSIS: Primary | ICD-10-CM

## 2024-11-19 DIAGNOSIS — I25.5 ISCHEMIC CARDIOMYOPATHY: ICD-10-CM

## 2024-11-19 LAB
AORTIC VALVE MEAN VELOCITY: 17.6 M/S
APICAL FOUR CHAMBER EJECTION FRACTION: 32 %
AV AREA BY CONTINUOUS VTI: 0.8 CM2
AV AREA PEAK VELOCITY: 0.9 CM2
AV LVOT MEAN GRADIENT: 1 MMHG
AV LVOT PEAK GRADIENT: 2 MMHG
AV MEAN GRADIENT: 14 MMHG
AV PEAK GRADIENT: 23 MMHG
AV VALVE AREA: 0.78 CM2
AV VELOCITY RATIO: 0.29
BSA FOR ECHO PROCEDURE: 2.23 M2
DOP CALC AO PEAK VEL: 2.4 M/S
DOP CALC AO VTI: 51.28 CM
DOP CALC LVOT AREA: 3.14 CM2
DOP CALC LVOT CARDIAC INDEX: 1.66 L/MIN/M2
DOP CALC LVOT CARDIAC OUTPUT: 3.69 L/MIN
DOP CALC LVOT DIAMETER: 2 CM
DOP CALC LVOT PEAK VEL VTI: 12.73 CM
DOP CALC LVOT PEAK VEL: 0.69 M/S
DOP CALC LVOT STROKE INDEX: 17.9 ML/M2
DOP CALC LVOT STROKE VOLUME: 39.97
E WAVE DECELERATION TIME: 160 MS
FRACTIONAL SHORTENING: 33 (ref 28–44)
INTERVENTRICULAR SEPTUM IN DIASTOLE (PARASTERNAL SHORT AXIS VIEW): 0.9 CM
INTERVENTRICULAR SEPTUM: 0.9 CM (ref 0.6–1.1)
LEFT INTERNAL DIMENSION IN SYSTOLE: 3.9 CM (ref 2.1–4)
LEFT VENTRICLE DIASTOLIC VOLUME (MOD BIPLANE): 95 ML
LEFT VENTRICLE DIASTOLIC VOLUME INDEX (MOD BIPLANE): 42.6 ML/M2
LEFT VENTRICLE SYSTOLIC VOLUME (MOD BIPLANE): 64 ML
LEFT VENTRICLE SYSTOLIC VOLUME INDEX (MOD BIPLANE): 28.7 ML/M2
LEFT VENTRICULAR INTERNAL DIMENSION IN DIASTOLE: 5.8 CM (ref 3.5–6)
LEFT VENTRICULAR POSTERIOR WALL IN END DIASTOLE: 1 CM
LEFT VENTRICULAR STROKE VOLUME: 101 ML
LV EF: 33 %
LVSV (TEICH): 101 ML
MV PEAK E VEL: 98 CM/S
MV STENOSIS PRESSURE HALF TIME: 46 MS
MV VALVE AREA P 1/2 METHOD: 4.78
SL CV LV EF: 33
SL CV PED ECHO LEFT VENTRICLE DIASTOLIC VOLUME (MOD BIPLANE) 2D: 167 ML
SL CV PED ECHO LEFT VENTRICLE SYSTOLIC VOLUME (MOD BIPLANE) 2D: 66 ML

## 2024-11-19 PROCEDURE — 97110 THERAPEUTIC EXERCISES: CPT | Performed by: PHYSICAL THERAPIST

## 2024-11-19 RX ORDER — METOPROLOL SUCCINATE 25 MG/1
25 TABLET, EXTENDED RELEASE ORAL 2 TIMES DAILY
Qty: 180 TABLET | Refills: 3 | Status: SHIPPED | OUTPATIENT
Start: 2024-11-19

## 2024-11-19 NOTE — TELEPHONE ENCOUNTER
Mr. Garcia's metoprolol was decreased at our last visit because he was reporting lightheadedness  Please call him -- his heart rates were very rapid during his echo  Increase the metoprolol again to 25 mg BID    I would like him to see electrophysiology to discuss strategies to keep him out of atrial fibrillation. Please instruct him to schedule.     His aortic stenosis is also now severe. I would like him to see our valve team to discuss valve options. Will add Concepción Nicholson to this correspondence to start the evaluation process.

## 2024-11-19 NOTE — RESULT ENCOUNTER NOTE
Relayed entirety of this message. However, pt wife would like to know how Erics EF percentage is doing?

## 2024-11-22 ENCOUNTER — OFFICE VISIT (OUTPATIENT)
Dept: PHYSICAL THERAPY | Facility: CLINIC | Age: 74
End: 2024-11-22
Payer: COMMERCIAL

## 2024-11-22 DIAGNOSIS — Z96.652 S/P TOTAL KNEE ARTHROPLASTY, LEFT: Primary | ICD-10-CM

## 2024-11-22 PROCEDURE — 97112 NEUROMUSCULAR REEDUCATION: CPT | Performed by: PHYSICAL THERAPIST

## 2024-11-22 PROCEDURE — 97150 GROUP THERAPEUTIC PROCEDURES: CPT | Performed by: PHYSICAL THERAPIST

## 2024-11-22 NOTE — PROGRESS NOTES
"Daily Note     Today's date: 2024  Patient name: Tian Garcia  : 1950  MRN: [id number]  Referring provider: Deepak Crane*  Dx:   Encounter Diagnosis     ICD-10-CM    1. S/P total knee arthroplasty, left  Z96.652                      Subjective: Patient reports to therapy this date after week off from PT. Feeling a bit better but continued pain happening.       Objective: See treatment diary below      Assessment: Pt was point tender over his ITB. Initiated cross friction massage and ice this session. Issued HEP for stretching activities to improve length. Plan next session to return to more standing weight bearing exercises.       Plan: Continue per plan of care.      Short Term Goal Expiration Date:(24)  Long Term Goal Expiration Date: (24)  POC Expiration Date: (24)      POC expires Unit limit Auth Expiration date PT/OT/ST + Visit Limit?                                 Visit/Unit Tracking  AUTH Status:  Date 10/7 10/11 10/14 10/18 10/22 10/23 10/29 11/1 11/5 11/8 11/19    Used 2 3 4 5 6 7 8 Waiting on auth  3 4    Remaining      2 1 0   5 4         Precautions        Manuals 10/25 10/28 11/1 11/5 11/8 11/19   Cross friction massage      ITB                              Neuro Re-Ed          Step ups 8\" 10x eafwd/at 8\" 10x ea fwd/lat 6\" step 10x ea      Lateral step ups         Modified lunge   10x ea LE  4 laps     Side stepping   4 laps GTB at knees  4 laps gtb at knees    Blue foam balance and ball toss         hurdles Fwd 4 laps and lateral 3 laps Fwd 4# aw 4 laps        Squats    2x10     Tandem walk w/ TD carry         Side step on balance beam         Ther Ex         Becky sitting stretch      30 sec x 3   S/l hip abd    2x10 w/ #2 w/ 3 sec hold     bridges      [id number]   Heel slide          Heel raise   30x  30x 30x   Stdg Hip Abd         Stdg Hip Ext         Recumbent bike 10 min L1         treadmill  5 min at 1.4 mph b/l UE 5 min at 1.4 mph b/l UE 5 min flat  5 " min incline  1.5 mph  10 min 1.4 mph 10 min 1.2 mph Prn UE    LAQ         TKE Gtb 2x10   GTB 2x10 GTB 2x10  2x10 b/l LE with green TB   Ther Activity                           Gait Training                           Modalities                                        Start Time: 0930  Stop Time: 1015  Total time in clinic (min): 45 minutes

## 2024-11-22 NOTE — PROGRESS NOTES
"Daily Note     Today's date: 2024  Patient name: Tian Garcia  : 1950  MRN: [id number]  Referring provider: Deepak Crane*  Dx: No diagnosis found.               Subjective: Felt better after last session with cross friction massage on lateral thigh and over ITB as well as stretching, has been oding stretching at home. Also experiencing more pain over medial aspect of knee, and continued swelling      Objective: See treatment diary below      Assessment: Completed massage and stretching as well as ice at end of session once again. Returned to more balance and stregnthening upright exercise this date with fair tolerance. Plan to complete once again at next session.       Plan: Continue per plan of care.      Short Term Goal Expiration Date:(24)  Long Term Goal Expiration Date: (24)  POC Expiration Date: (24)      POC expires Unit limit Auth Expiration date PT/OT/ST + Visit Limit?                                 Visit/Unit Tracking  AUTH Status:  Date 10/7 10/11 10/14 10/18 10/22 10/23 10/29 11/1 11/5 11/8 11/22    Used 2 3 4 5 6 7 8 Waiting on auth  3 4    Remaining      2 1 0   5 4         Precautions        Manuals 10/25 10/28 11/1 11/5 11/8 11/22   Cross friction massage ITB left      5 min                               Neuro Re-Ed          Step ups 8\" 10x eafwd/at 8\" 10x ea fwd/lat 6\" step 10x ea   10x 6\" //bars   Lateral step ups      10x 6\" //bars   Modified lunge   10x ea LE  4 laps     Side stepping   4 laps GTB at knees  4 laps gtb at knees    Blue foam balance and ball toss         hurdles Fwd 4 laps and lateral 3 laps Fwd 4# aw 4 laps        Squats    2x10     Tandem walk w/ TD carry         Side step on balance beam         Ther Ex         NUstep      10 min L7   S/l hip abd    2x10 w/ #2 w/ 3 sec hold     bridges         Heel slide          Heel raise   30x  30x    Stdg Hip Abd         Stdg Hip Ext         Recumbent bike 10 min L1         treadmill  5 min at 1.4 " mph b/l UE 5 min at 1.4 mph b/l UE 5 min flat  5 min incline  1.5 mph  10 min 1.4 mph    LAQ         TKE Gtb 2x10   GTB 2x10 GTB 2x10     Ther Activity                           Gait Training                           Modalities                                        Start Time: 0930  Stop Time: 1015  Total time in clinic (min): 45 minutes

## 2024-11-26 ENCOUNTER — OFFICE VISIT (OUTPATIENT)
Dept: PHYSICAL THERAPY | Facility: CLINIC | Age: 74
End: 2024-11-26
Payer: COMMERCIAL

## 2024-11-26 DIAGNOSIS — Z96.652 S/P TOTAL KNEE ARTHROPLASTY, LEFT: Primary | ICD-10-CM

## 2024-11-26 PROCEDURE — 97110 THERAPEUTIC EXERCISES: CPT | Performed by: PHYSICAL THERAPIST

## 2024-11-26 PROCEDURE — 97112 NEUROMUSCULAR REEDUCATION: CPT | Performed by: PHYSICAL THERAPIST

## 2024-11-26 NOTE — PROGRESS NOTES
"Daily Note     Today's date: 2024  Patient name: Tian Garcia  : 1950  MRN: [id number]  Referring provider: Deepak Crane*  Dx:   Encounter Diagnosis     ICD-10-CM    1. S/P total knee arthroplasty, left  Z96.652                      Subjective: Patient reports no new changes since last session. Continues to have pain in the medial aspect of his knee in addition to the lateral aspect. Feeling frustrated and wondering if       Objective: See treatment diary below      Assessment: Encouraged pt to keep slight flexion in knees bilaterally when side steppign which he struggled to complete. Did discuss that it's likely to continue to have aches and pain sin his knee through the recovery process and due to weakness in his right knee he likely puts stress more on his left knee. Will continue to monitor pain on medial aspect of knee. Discussed that it may be due to avoiding pain on the lateral aspect that he has more pain on medial aspect.       Plan: Continue per plan of care.      Short Term Goal Expiration Date:(24)  Long Term Goal Expiration Date: (24)  POC Expiration Date: (24)      POC expires Unit limit Auth Expiration date PT/OT/ST + Visit Limit?                                 Visit/Unit Tracking  AUTH Status:  Date 10/7 10/11 10/14 10/18 10/22 10/23 10/29 11/1 11/5 11/8 11/22    Used 2 3 4 5 6 7 8 Waiting on auth  3 4    Remaining      2 1 0   5 4                    6               2                   Precautions        Manuals 11/26 10/28 11/1 11/5 11/8 11/22   Cross friction massage ITB left      5 min                               Neuro Re-Ed          Step ups  8\" 10x ea fwd/lat 6\" step 10x ea   10x 6\" //bars   Lateral step ups      10x 6\" //bars   Modified lunge   10x ea LE  4 laps     Side stepping GTB at knees in slight squat   4 laps GTB at knees  4 laps gtb at knees    Blue foam balance and ball toss         hurdles  Fwd 4# aw 4 laps        Squats    2x10   "   lunges 4 laps //bars        HKM 5 laps Prn UE //bars        Ther Ex         NUstep      10 min L7   S/l hip abd    2x10 w/ #2 w/ 3 sec hold     bridges         Heel slide          Heel raise 30x  30x  30x    Stdg Hip Abd         Stdg Hip Ext         Recumbent bike         treadmill 5 min at 1.2 mph b/l UE 5 min at 1.4 mph b/l UE 5 min at 1.4 mph b/l UE 5 min flat  5 min incline  1.5 mph  10 min 1.4 mph    LAQ         TKE   GTB 2x10 GTB 2x10     Ther Activity                           Gait Training                           Modalities                                        Start Time: 0930  Stop Time: 1015  Total time in clinic (min): 45 minutes

## 2024-11-29 ENCOUNTER — OFFICE VISIT (OUTPATIENT)
Dept: PHYSICAL THERAPY | Facility: CLINIC | Age: 74
End: 2024-11-29
Payer: COMMERCIAL

## 2024-11-29 DIAGNOSIS — Z96.652 S/P TOTAL KNEE ARTHROPLASTY, LEFT: Primary | ICD-10-CM

## 2024-11-29 PROCEDURE — 97110 THERAPEUTIC EXERCISES: CPT | Performed by: PHYSICAL THERAPIST

## 2024-11-29 PROCEDURE — 97112 NEUROMUSCULAR REEDUCATION: CPT | Performed by: PHYSICAL THERAPIST

## 2024-11-29 NOTE — PROGRESS NOTES
"Daily Note     Today's date: 2024  Patient name: Tian Garcia  : 1950  MRN: [id number]  Referring provider: Deepak Crane*  Dx:   Encounter Diagnosis     ICD-10-CM    1. S/P total knee arthroplasty, left  Z96.652                      Subjective: Patient reports feeling frustrated with progress with left knee. It doesn't feel better and still painful and stiff. Has been doing Hep regularly.       Objective: See treatment diary below      Assessment: Trialed cross friction massage over palpable lump on superior quadricep. After stretches and manual patient was more mobile. He also voiced frustration with ROM into extension of left knee. Pt is able to obtain 0 degrees knee extension where as his right knee hyperextends. Pt was able to complete exercises with some increase in pain but no significant pain.       Plan: Continue per plan of care.      Short Term Goal Expiration Date:(24)  Long Term Goal Expiration Date: (24)  POC Expiration Date: (24)      POC expires Unit limit Auth Expiration date PT/OT/ST + Visit Limit?                                 Visit/Unit Tracking  AUTH Status:  Date 10/7 10/11 10/14 10/18 10/22 10/23 10/29 11/1 11/5 11/8 11/22    Used 2 3 4 5 6 7 8 Waiting on auth  3 4    Remaining      2 1 0   5 4                   6 7              2 1                  Precautions        Manuals    Cross friction massage ITB left  EM    5 min                               Neuro Re-Ed          Step ups  8\" step with 10x ea fwd 6\" step 10x ea   10x 6\" //bars   Lateral step ups      10x 6\" //bars   Modified lunge   10x ea LE  4 laps     Side stepping GTB at knees in slight squat   4 laps GTB at knees  4 laps gtb at knees    Blue foam balance and ball toss         hurdles         Squats    2x10     lunges 4 laps //bars 4 laps //bars       HKM 5 laps Prn UE //bars        Ther Ex         NUstep      10 min L7   S/l hip abd    2x10 w/ " #2 w/ 3 sec hold     bridges         Heel slide          Heel raise 30x  30x  30x    Stdg Hip Abd         Stdg Hip Ext         Recumbent bike         treadmill 5 min at 1.2 mph b/l UE 5 min at 1.4 mph 5% incline 5 min at 1.4 mph b/l UE 5 min flat  5 min incline  1.5 mph  10 min 1.4 mph    LAQ         TKE  Blue TB - 2x10 GTB 2x10 GTB 2x10     Ther Activity                           Gait Training                           Modalities                                        Start Time: 0930  Stop Time: 1015  Total time in clinic (min): 45 minutes

## 2024-12-04 ENCOUNTER — TELEPHONE (OUTPATIENT)
Dept: CARDIOLOGY CLINIC | Facility: CLINIC | Age: 74
End: 2024-12-04

## 2024-12-04 ENCOUNTER — CONSULT (OUTPATIENT)
Dept: CARDIOLOGY CLINIC | Facility: CLINIC | Age: 74
End: 2024-12-04
Payer: COMMERCIAL

## 2024-12-04 VITALS
HEART RATE: 120 BPM | SYSTOLIC BLOOD PRESSURE: 108 MMHG | BODY MASS INDEX: 32.9 KG/M2 | WEIGHT: 235 LBS | DIASTOLIC BLOOD PRESSURE: 62 MMHG | HEIGHT: 71 IN

## 2024-12-04 DIAGNOSIS — I48.0 PAROXYSMAL A-FIB (HCC): Primary | ICD-10-CM

## 2024-12-04 DIAGNOSIS — I25.5 ISCHEMIC CARDIOMYOPATHY: ICD-10-CM

## 2024-12-04 DIAGNOSIS — I35.0 NONRHEUMATIC AORTIC VALVE STENOSIS: ICD-10-CM

## 2024-12-04 PROCEDURE — 99204 OFFICE O/P NEW MOD 45 MIN: CPT | Performed by: INTERNAL MEDICINE

## 2024-12-04 PROCEDURE — 93000 ELECTROCARDIOGRAM COMPLETE: CPT | Performed by: INTERNAL MEDICINE

## 2024-12-04 RX ORDER — AMIODARONE HYDROCHLORIDE 200 MG/1
200 TABLET ORAL 2 TIMES DAILY
Qty: 28 TABLET | Refills: 0 | Status: SHIPPED | OUTPATIENT
Start: 2024-12-04

## 2024-12-04 RX ORDER — AMIODARONE HYDROCHLORIDE 200 MG/1
200 TABLET ORAL DAILY
Qty: 30 TABLET | Refills: 3 | Status: SHIPPED | OUTPATIENT
Start: 2024-12-18

## 2024-12-04 NOTE — PATIENT INSTRUCTIONS
Amiodarone 200 mg twice daily for 2 weeks and then once daily     Reduce metoprolol to 25 mg once daily after one week.    Our office will be in touch with you to set-up the ablation procedure.     Check INR twice a week for next month     Check your heart rate twice a day - If heart rate remains over 120 bpm then let me know.

## 2024-12-04 NOTE — PROGRESS NOTES
EPS Consultation/New Patient Evaluation - Tian Garcia 74 y.o. male MRN: [id number]       Referring:Dr. Lock    CC/HPI:   It was a pleasure to see Tian Garcia in our arrhythmia clinic at [facility]. As you know he is a 74 y.o. man with WILL on CPAP, arthirits, factor V Leiden, DVT and paorxysmal atrial fibrillation who presents to discuss management of atrial fibrillation.     Patient has diagnosis of atrial fibrillation that has been paroxysmal in the past.  He reports having ablation done in the past though chart review does not show any procedure report.  He had cardioversions in the past as well.  More recently after he is second knee procedure he has been having increased burden of atrial fibrillation.  He has remained in atrial fibrillation over the past several days with higher heart rate.  His metoprolol dose was initially increased in the hospital for RVR however it was reduced and follow-up due to patient having dizziness.    He feels fatigue but per wife, does not indorse much symptoms normally. He denies any significant swelling in legs.     Past Medical History:  Past Medical History:   Diagnosis Date    Acute venous embolism and thrombosis of deep vessels of proximal lower extremity (HCC)     Last assessed: 5/18/15    JANEL (acute kidney injury) (HCC) 4/22/2022    Arthritis     Cellulitis     LE    Chronic kidney disease 03/2020    Acute Kidney Injury    CPAP (continuous positive airway pressure) dependence     Factor V Leiden (HCC)     Forgetfulness     Gross hematuria 05/17/2022    Headache(784.0) 03/2022    Never till cervical  spine surgery    Heart murmur 03/2020    Told when in hospital    History of transfusion     [place] (hard of hearing)     Hypoalbuminemia 05/11/2022    Other acute osteomyelitis, other site (HCC) 01/03/2023    Paroxysmal atrial fibrillation (HCC)     Last assessed: 11/2/15    Sleep apnea        Medications:      Current Outpatient Medications:      amiodarone 200 mg tablet, Take 1 tablet (200 mg total) by mouth 2 (two) times a day, Disp: 28 tablet, Rfl: 0    [START ON 12/18/2024] amiodarone 200 mg tablet, Take 1 tablet (200 mg total) by mouth daily Do not start before December 18, 2024., Disp: 30 tablet, Rfl: 3    Aspirin 81 MG CAPS, Take by mouth, Disp: , Rfl:     atorvastatin (LIPITOR) 80 mg tablet, TAKE 1 TABLET BY MOUTH EVERY EVENING, Disp: 90 tablet, Rfl: 1    DULoxetine (CYMBALTA) 60 mg delayed release capsule, TAKE 1 CAPSULE BY MOUTH EVERY DAY, Disp: 90 capsule, Rfl: 1    ezetimibe (ZETIA) 10 mg tablet, TAKE 1 TABLET BY MOUTH EVERY DAY, Disp: 90 tablet, Rfl: 3    gabapentin (NEURONTIN) 300 mg capsule, TAKE 1 CAPSULE BY MOUTH  DAILY AT BEDTIME, Disp: 90 capsule, Rfl: 1    glucose blood (Accu-Chek Guide) test strip, Use one new strip daily dx r73.01, Disp: 100 strip, Rfl: 1    lisinopril (ZESTRIL) 5 mg tablet, Take 1 tablet (5 mg total) by mouth daily, Disp: 90 tablet, Rfl: 3    methocarbamol (ROBAXIN) 500 mg tablet, Take 1 tablet (500 mg total) by mouth 3 (three) times a day, Disp: 30 tablet, Rfl: 0    metoprolol succinate (TOPROL-XL) 25 mg 24 hr tablet, Take 1 tablet (25 mg total) by mouth 2 (two) times a day, Disp: 180 tablet, Rfl: 3    mometasone (ELOCON) 0.1 % cream, APPLY TO AFFECTED AREA TOPICALLY EVERY DAY, Disp: 45 g, Rfl: 1    nitroglycerin (NITROSTAT) 0.4 mg SL tablet, Place 1 tablet (0.4 mg total) under the tongue every 5 (five) minutes as needed for chest pain, Disp: 30 tablet, Rfl: 0    potassium chloride (Klor-Con M20) 20 mEq tablet, Take 1 tablet (20 mEq total) by mouth daily, Disp: 90 tablet, Rfl: 3    tamsulosin (FLOMAX) 0.4 mg, Take 1 capsule (0.4 mg total) by mouth daily with dinner, Disp: 90 capsule, Rfl: 1    torsemide (DEMADEX) 20 mg tablet, Take 1 tablet (20 mg total) by mouth daily, Disp: 90 tablet, Rfl: 3    warfarin (COUMADIN) 5 mg tablet, TAKE ONE-HALF TABLET BY MOUTH  DAILY , EXCEPT TAKE 1 TABLET BY  MOUTH ON WEDNESDAY AND  SATURDAY, Disp: 58 tablet, Rfl: 1    zolpidem (AMBIEN) 10 mg tablet, TAKE 1 TABLET BY MOUTH DAILY AT  BEDTIME AS NEEDED FOR SLEEP, Disp: 90 tablet, Rfl: 0    ascorbic acid (VITAMIN C) 500 MG tablet, Take 1 tablet (500 mg total) by mouth 2 (two) times a day (Patient not taking: Reported on 12/4/2024), Disp: 60 tablet, Rfl: 1    tirzepatide (Mounjaro) 2.5 MG/0.5ML, INJECT 0.5 ML (2.5 MG TOTAL) UNDER THE SKIN EVERY 7 DAYS (Patient not taking: No sig reported), Disp: 2 mL, Rfl: 1     Family History   Problem Relation Age of Onset    Lung cancer Mother     Hearing loss Father     Emphysema Sister     Heart attack Brother     Atrial fibrillation Brother     Clotting disorder Son      Social History     Socioeconomic History    Marital status: /Civil Union     Spouse name: Elsy    Number of children: 4    Years of education: Not on file    Highest education level: Not on file   Occupational History    Occupation: Retired   Tobacco Use    Smoking status: Never    Smokeless tobacco: Never   Vaping Use    Vaping status: Never Used   Substance and Sexual Activity    Alcohol use: Not Currently    Drug use: No    Sexual activity: Not Currently     Partners: Female   Other Topics Concern    Not on file   Social History Narrative    Caffeine use: 2 cups coffee a day     Social Drivers of Health     Financial Resource Strain: Medium Risk (2/7/2024)    Overall Financial Resource Strain (CARDIA)     Difficulty of Paying Living Expenses: Somewhat hard   Food Insecurity: No Food Insecurity (1/3/2023)    Hunger Vital Sign     Worried About Running Out of Food in the Last Year: Never true     Ran Out of Food in the Last Year: Never true   Transportation Needs: No Transportation Needs (2/7/2024)    PRAPARE - Transportation     Lack of Transportation (Medical): No     Lack of Transportation (Non-Medical): No   Physical Activity: Not on file   Stress: Not on file   Social Connections: Not on file   Intimate Partner Violence: Not on  "file   Housing Stability: Low Risk  (5/11/2022)    Housing Stability Vital Sign     Unable to Pay for Housing in the Last Year: No     Number of Places Lived in the Last Year: 1     Unstable Housing in the Last Year: No     Social History     Tobacco Use   Smoking Status Never   Smokeless Tobacco Never     Social History     Substance and Sexual Activity   Alcohol Use Not Currently       Review of Systems   Constitutional: Positive for malaise/fatigue.   Cardiovascular:  Positive for dyspnea on exertion and leg swelling.        Objective:     Vitals: Blood pressure 108/62, pulse (!) 120, height 5' 11\" (1.803 m), weight 107 kg (235 lb)., Body mass index is 32.78 kg/m².,        Physical Exam:    GEN: Tian Garcia appears well, alert and oriented x 3, pleasant and cooperative   HEENT: pupils equal, round, and reactive to light; extraocular muscles intact  NECK: supple, no carotid bruits   HEART: Irregularly irregular, normal S1 and S2, no murmurs, clicks, gallops or rubs   LUNGS: clear to auscultation bilaterally; no wheezes, rales, or rhonchi   ABDOMEN: normal bowel sounds, soft, no tenderness, no distention  EXTREMITIES: peripheral pulses normal; no clubbing, cyanosis, or edema  NEURO: no focal findings   SKIN: normal without suspicious lesions on exposed skin      Labs & Results:  Below is the patient's most recent value for Albumin, ALT, AST, BUN, Calcium, Chloride, Cholesterol, CO2, Creatinine, GFR, Glucose, HDL, Hematocrit, Hemoglobin, Hemoglobin A1C, LDL, Magnesium, Phosphorus, Platelets, Potassium, PSA, Sodium, Triglycerides, and WBC.   Lab Results   Component Value Date    ALT 18 08/12/2024    AST 21 08/12/2024    BUN 36 (H) 09/24/2024    CALCIUM 8.9 09/24/2024     09/24/2024    CHOL 151 10/25/2017    CO2 25 09/24/2024    CREATININE 1.98 (H) 09/24/2024    HDL 58 11/12/2024    HCT 33.8 (L) 09/24/2024    HGB 11.0 (L) 09/24/2024    HGBA1C 6.2 (H) 04/09/2023    MG 2.2 04/18/2023    PHOS 3.6 05/22/2023    "  09/24/2024    K 4.4 09/24/2024    PSA 1.2 01/09/2023     10/25/2017    TRIG 72 11/12/2024    WBC 11.58 (H) 09/24/2024     Note: for a comprehensive list of the patient's lab results, access the Results Review activity.          Cardiac testing:     I personally reviewed the ECG performed in the clinic on 12/04/24. It reveals atrial fibrillation with RVR at 120 bpm.     Echocardiograms:  No results found for this or any previous visit.    No results found for this or any previous visit.      Catheterizations:   No results found for this or any previous visit.      Stress Tests:  No results found for this or any previous visit.      Holter monitor -   No results found for this or any previous visit.    No results found for this or any previous visit.        ASSESSMENT/PLAN:  Paroxysmal atrial fibrillation  Patient had episode of atrial fibrillation with RVR post TKA surgery  Metoprolol succinate dose was increased however he could not tolerated  Dose was reduced to 25 mg daily  Currently maintained on warfarin for anticoagulation  We discussed options of controlling rhythm with either antiarrhythmic therapy versus ablation procedure given asymptomatic nature of atrial fibrillation and cardiomyopathy  After answering all the question he opted to proceed with ablation procedure  He will benefit from further rate control though his sinus rate will get slow  Will start amiodarone 200 mg twice daily  x 2 weeks then 200 mg daily (INR has been therapeutic in the past month)  Check INR twice weekly  Reduce metoprolol to 25 mg once daily in one week  Office will set-up ablation procedure   If he gets TAVR scheduled then plan for ablation after TAVR  Severe aortic stenosis  Noted on recent ECHO  Though not high on gradient  Could be due to decrease flow  Has appt with CT surgery tomorrow   CAD  Patient had ST elevation MI in April 2023  Underwent PCI to proximal to mid LAD  Maintained on aspirin, atorvastatin and  Toprol-XL  Denies angina  Ischemic cardiomyopathy  Echocardiogram had shown ejection fraction of 37% but most recently echocardiogram showed an ejection fraction of 33% as he was in atrial fibrillation at the time  Currently on goal-directed medical therapy  DVT/factor V leiden def  Maintained on AC

## 2024-12-04 NOTE — TELEPHONE ENCOUNTER
----- Message from Derik Amin MD sent at 12/4/2024 11:15 AM EST -----  Regarding: atrial fibrillation ablation  Chucky Bruce/Soniya/Elizabeth,    Please schedule this patient for DIANA/atrial fibrillation ablation     Routine labs    Hold medication: None    System/Device company: [employer]    Thank you

## 2024-12-05 ENCOUNTER — OFFICE VISIT (OUTPATIENT)
Dept: CARDIAC SURGERY | Facility: CLINIC | Age: 74
End: 2024-12-05

## 2024-12-05 VITALS
WEIGHT: 233 LBS | HEART RATE: 60 BPM | DIASTOLIC BLOOD PRESSURE: 61 MMHG | OXYGEN SATURATION: 100 % | SYSTOLIC BLOOD PRESSURE: 96 MMHG | BODY MASS INDEX: 32.62 KG/M2 | HEIGHT: 71 IN

## 2024-12-05 DIAGNOSIS — I35.0 SEVERE AORTIC STENOSIS: Primary | ICD-10-CM

## 2024-12-05 DIAGNOSIS — I35.0 NONRHEUMATIC AORTIC VALVE STENOSIS: ICD-10-CM

## 2024-12-05 NOTE — LETTER
December 5, 2024     Aneta Santillan DO  [facility]  [address] [place] [id number]    Patient: Tian Garcia   YOB: 1950   Date of Visit: 12/5/2024       Dear Dr. Santillan:    Thank you for referring Tian Garcia to me for evaluation. Below are my notes for this consultation.    If you have questions, please do not hesitate to call me. I look forward to following your patient along with you.         Sincerely,        Micky Zelaya MD        CC: MD Derik Malik MD Ellyn Dornseif, PA-C  12/5/2024  4:23 PM  Attested  Consultation - Cardiac Surgery   Tian Garcia 74 y.o. male MRN: [id number]    Physician Requesting Consult: Dr. Wise    Reason for Consult / Principal Problem: Aortic stenosis, Non-Rheumatic    History of Present Illness: Tina Garcia is a 74 y.o. year old male who presents for evaluation of severe AS. Patient cardiac hx dates back ~15 years to which he was found to be in a fib during a colonoscopy & referred to o/p cardiology. Since then has had an ablation/DCCV & has done well. In 2023 he had a STEMI to which he had ROBERT x2 to LAD w/ residual 80% RCA lesion. At the time his EF dropped to 30-35%, he wore a LifeVest & 3 month f/u TTE showed EF recovery to 55-60% & moderate AS. He has followed w/ Dr. Wise since that time. He had a visit in August which showed a drop in his EF to 37% however f/u was still recommended. This past year he has been very active w/ his cardiac rehab then swimming recreationally then staged TKR w/ rehabilitation however a during his TKR in September he was noted to be in atrial fibrillation intra-op therefore cardiology was made aware & recommended o/p f/u. Dr. Wise got an updated TTE at 3 months rather than his normal 6 month showing progression of AS to severe & drop in EF to 33%. He has now been referred to EP as well as cardiac sx for atrial fibrillation management & surgical aortic stenosis evaluation.    Upon  examination today, Mr. Garcia reports he is feeling poorly but stable. Admits to increase in his overall fatigue & fatigability over the past few months, SOB/MONTOYA w/ straining/strenuous activity & stairs, intermittent lightheadedness. He does not fele when he goes in & out of atrial fibrillation. Denies CP, palpitations, SOB, MONTOYA, LE edema b/l, orthopnea, PND, numbness/tinlging/paresthesias in UE or LE bilaterally, HA, lightheadeness, dizziness, presyncopal symptoms, hx syncopal events, N/V/D, hemoptysis, hematemesis, hematochezia, melena. Denies hx stroke, hx cancer w/ chest wall radiation, hx blood loss anemia, hx varicose veins or vein stripping.    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 PSH includes cervical fusion, b/l RTKR, renal bx, lumber herniated disc, R cataract extraction, open hemicolectomy for diverticulitis, atrial flutter/fibrillation ablation, DCCV, I&D cervical abscess. FH significant for brother w/ SCD from MI age 69 as well as CHF/atrial fibrillation. Denies FH of  HTN, HL, valvular disease, DM, or aortic aneurysms. Patient is retired & worked as . Lives in a home w/ wife. Does not use an assist device for ambulation prior to knee replacements, currently using canes for support. Drives. Free time activities include swimming (prior to TKR), yardwork. Denies current or prior use of  tobacco, ETOH, or drug use. Allergies include morphine with rxn GI intolerance. Cardiac pertinent medications include: Amiodarone 200mg QDay, Aspirin 81mg, Lipitor 80mg, Zetia 10mg, Lisinopril 5mg, Toprol 25mg BID, Torsemide 20mg, Coumadin dosing. Other medications can be reviewed in the patient chart.    Patient sees Dr. Santillan as his primary care physician, their prior visit documentation was reviewed at this visit. Patient sees Dr. Wise as his cardiologist, their proior visit documentation was reviewed at this visit. Patient sees Dr. Amin as his electrophysiologist, their proior visit documentation was reviewed at this visit. Patient sees Dr. Covarrubias as his nephrologist, their proior visit documentation was reviewed at this visit. (+) upper dentures. LV w/I 6 months at [facility], Dr. Schoenley.    Past Medical History:  Past Medical History:   Diagnosis Date   • Acute venous embolism and thrombosis of deep vessels of proximal lower extremity ([facility])     Last assessed: 5/18/15   • JANEL (acute kidney injury) ([facility]) 4/22/2022   • Arthritis    • Cellulitis     LE   • Chronic kidney disease 03/2020    Acute Kidney Injury   • CPAP (continuous positive airway pressure) dependence    • Factor V Leiden ([facility])    • Forgetfulness    • Gross hematuria 05/17/2022   • Headache(784.0) 03/2022    Never till cervical  spine surgery   • Heart murmur 03/2020    Told when in hospital   • History of transfusion    • [place] (hard of hearing)    • Hypoalbuminemia 05/11/2022   • Other acute osteomyelitis, other site ([facility]) 01/03/2023   • Paroxysmal atrial fibrillation ([facility])     Last assessed: 11/2/15   • Sleep apnea      Past Surgical History:   Past Surgical History:   Procedure Laterality Date   • BACK SURGERY      Lower   • CARDIAC CATHETERIZATION N/A 04/09/2023    Procedure: Cardiac pci;  Surgeon: Bird Cast MD;  Location: [facility];  Service: Cardiology   • CARDIAC CATHETERIZATION N/A 04/09/2023     Procedure: Cardiac Coronary Angiogram;  Surgeon: Bird Cast MD;  [facility];  Service: Cardiology   • CATARACT EXTRACTION     • COLON SURGERY     • COLONOSCOPY     • INCISION AND DRAINAGE POSTERIOR SPINE N/A 04/01/2022    Procedure: Posterior cervical evacuation of postoperative collection and debridement with placement of drains C3-T1;  Surgeon: Rajeev Garcia MD;  Location: [facility];  Service: Neurosurgery   • IR BIOPSY KIDNEY RANDOM  05/26/2022   • IR TUNNELED DIALYSIS CATHETER PLACEMENT  05/23/2022   • IR TUNNELED DIALYSIS CATHETER REMOVAL  06/02/2022   • WA ARTHRD ANT INTERBODY MIN DSC CRV BELOW C2 N/A 03/11/2022    Procedure: Anterior cervical discectomy and fixation fusion C5/6 and C6/7; Posterior cervical decompression and instrumented fusion C3-T1;  Surgeon: Rajeev Garcia MD;  Location: [facility];  Service: Neurosurgery   • WA ARTHRP KNE CONDYLE&PLATU MEDIAL&LAT COMPARTMENTS Right 4/8/2024    Procedure: ARTHROPLASTY KNEE TOTAL and all associated procedures;  Surgeon: Dot Galindo MD;  Location: [facility];  Service: Orthopedics   • WA ARTHRP KNE CONDYLE&PLATU MEDIAL&LAT COMPARTMENTS Left 9/23/2024    Procedure: ARTHROPLASTY KNEE TOTAL;  Surgeon: Dot Galindo MD;  Location[facility];  Service: Orthopedics   • RENAL BIOPSY  6/2022   • SPINE SURGERY  03/11/2022    Cervical myelopathy/ cervical fusion     Family History:  Family History   Problem Relation Age of Onset   • Lung cancer Mother    • Hearing loss Father    • Emphysema Sister    • Heart attack Brother    • Atrial fibrillation Brother    • Clotting disorder Son      Social History:  Social History     Substance and Sexual Activity   Alcohol Use Not Currently     Social History     Substance and Sexual Activity   Drug Use No     Social History     Tobacco Use   Smoking Status Never   Smokeless Tobacco Never       Home Medications:   Prior to Admission medications    Medication Sig Start Date  End Date Taking? Authorizing Provider   amiodarone 200 mg tablet Take 1 tablet (200 mg total) by mouth 2 (two) times a day 12/4/24  Yes Derik Amin MD   amiodarone 200 mg tablet Take 1 tablet (200 mg total) by mouth daily Do not start before December 18, 2024. 12/18/24  Yes Dreik Amin MD   Aspirin 81 MG CAPS Take by mouth   Yes Historical Provider, MD   atorvastatin (LIPITOR) 80 mg tablet TAKE 1 TABLET BY MOUTH EVERY EVENING 5/30/24  Yes Bernardo Jefferson MD   DULoxetine (CYMBALTA) 60 mg delayed release capsule TAKE 1 CAPSULE BY MOUTH EVERY DAY 8/12/24  Yes Aneta Santillan DO   ezetimibe (ZETIA) 10 mg tablet TAKE 1 TABLET BY MOUTH EVERY DAY 8/26/24  Yes Seamus Lock MD   gabapentin (NEURONTIN) 300 mg capsule TAKE 1 CAPSULE BY MOUTH  DAILY AT BEDTIME 10/8/24  Yes Aneta Santillan DO   glucose blood (Accu-Chek Guide) test strip Use one new strip daily dx r73.01 2/28/24  Yes Aneta Santillan DO   lisinopril (ZESTRIL) 5 mg tablet Take 1 tablet (5 mg total) by mouth daily 8/16/24  Yes Seamus Lock MD   methocarbamol (ROBAXIN) 500 mg tablet Take 1 tablet (500 mg total) by mouth 3 (three) times a day 11/12/24  Yes Deepak Crane PA-C   metoprolol succinate (TOPROL-XL) 25 mg 24 hr tablet Take 1 tablet (25 mg total) by mouth 2 (two) times a day 11/19/24  Yes Seamus Lock MD   mometasone (ELOCON) 0.1 % cream APPLY TO AFFECTED AREA TOPICALLY EVERY DAY 2/7/24  Yes Aneta Santillan DO   nitroglycerin (NITROSTAT) 0.4 mg SL tablet Place 1 tablet (0.4 mg total) under the tongue every 5 (five) minutes as needed for chest pain 5/18/23  Yes Seamus Lock MD   potassium chloride (Klor-Con M20) 20 mEq tablet Take 1 tablet (20 mEq total) by mouth daily 8/16/24  Yes Seamus Lock MD   tamsulosin (FLOMAX) 0.4 mg Take 1 capsule (0.4 mg total) by mouth daily with dinner 8/26/24  Yes Aneta Santillan,    torsemide (DEMADEX) 20 mg tablet Take 1 tablet (20 mg total) by mouth daily 11/8/24  Yes Seamus Lock,  "MD   warfarin (COUMADIN) 5 mg tablet TAKE ONE-HALF TABLET BY MOUTH  DAILY , EXCEPT TAKE 1 TABLET BY  MOUTH ON WEDNESDAY AND SATURDAY 10/30/24  Yes Aneta Santillan DO   zolpidem (AMBIEN) 10 mg tablet TAKE 1 TABLET BY MOUTH DAILY AT  BEDTIME AS NEEDED FOR SLEEP 11/6/24  Yes Aneta Santillan DO   ascorbic acid (VITAMIN C) 500 MG tablet Take 1 tablet (500 mg total) by mouth 2 (two) times a day  Patient not taking: Reported on 12/4/2024 9/17/24 12/5/24  Deepak Crane PA-C   tirzepatide (Mounjaro) 2.5 MG/0.5ML INJECT 0.5 ML (2.5 MG TOTAL) UNDER THE SKIN EVERY 7 DAYS  Patient not taking: No sig reported 7/15/24 12/5/24  Aneta Santillan DO       Allergies:  Allergies   Allergen Reactions   • Morphine GI Intolerance   • Vitamin K Other (See Comments)     On coumadin-patient sensitive to vitamin K amounts in meds etc.       Review of Systems:     Review of Systems   Constitutional:  Positive for activity change and fatigue. Negative for diaphoresis and unexpected weight change.   HENT: Negative.  Negative for dental problem.    Respiratory:  Positive for shortness of breath. Negative for chest tightness and wheezing.    Cardiovascular: Negative.  Negative for chest pain, palpitations and leg swelling.   Gastrointestinal: Negative.  Negative for blood in stool, constipation, diarrhea, nausea and vomiting.   Genitourinary: Negative.    Musculoskeletal: Negative.  Negative for arthralgias, back pain, gait problem and myalgias.   Skin: Negative.    Neurological: Negative.  Negative for dizziness, syncope, weakness, light-headedness, numbness and headaches.   Hematological: Negative.  Does not bruise/bleed easily.   All other systems reviewed and are negative.      Vital Signs:     Vitals:    12/05/24 1441 12/05/24 1444   BP: 101/69 96/61   BP Location: Left arm Right arm   Patient Position: Sitting Sitting   Cuff Size: Standard Standard   Pulse: 60    SpO2: 100%    Weight: 106 kg (233 lb)    Height: 5' 11\" (1.803 m)        Physical Exam:  " "   Physical Exam  Constitutional:       General: He is not in acute distress.     Appearance: Normal appearance. He is well-developed. He is obese. He is not ill-appearing or toxic-appearing.      Comments: Sitting in chair in NAD   HENT:      Head: Normocephalic and atraumatic.      Mouth/Throat:      Dentition: Normal dentition. Does not have dentures.      Pharynx: Uvula midline.   Neck:      Vascular: No carotid bruit or JVD.      Trachea: Trachea normal.   Cardiovascular:      Rate and Rhythm: Normal rate and regular rhythm.      Chest Wall: PMI is not displaced.      Heart sounds: S1 normal and S2 normal. Murmur heard.      Systolic murmur is present with a grade of 4/6.      No friction rub. No S3 or S4 sounds.      Comments: No heaves/lifts on palpation  Pulmonary:      Effort: Pulmonary effort is normal. No accessory muscle usage or respiratory distress.      Breath sounds: Normal breath sounds. No wheezing, rhonchi or rales.   Abdominal:      General: Bowel sounds are normal. There is no distension.      Palpations: Abdomen is not rigid. There is no mass.      Tenderness: There is no abdominal tenderness. There is no guarding or rebound.   Musculoskeletal:      Cervical back: Normal range of motion and neck supple.   Skin:     General: Skin is warm and dry.      Findings: No bruising, petechiae or rash.      Nails: There is no clubbing.      Comments: Trace edema b/l LE, no varicosities appreciated to b/l LE   Neurological:      Mental Status: He is alert and oriented to person, place, and time.      Cranial Nerves: No cranial nerve deficit.      Sensory: No sensory deficit.      Comments: No focal deficit appreciated       Lab Results:               Invalid input(s): \"LABGLOM\"      Lab Results   Component Value Date    HGBA1C 6.2 (H) 04/09/2023     Lab Results   Component Value Date    CKTOTAL 36 (L) 04/26/2022       Imaging Studies:     Echocardiogram: EF 33%, severe AS (mean 0.78cm2, mean 14), mild AI, " mild MR    Cardiac cath 4/2023: s/p ROBERT x2 to prox/mid LAD, residual 80% RCA    EKG: rate controlled a fib, RBBB    Results Review Statement: I personally reviewed the following image studies in PACS and associated radiology reports: cardiac cath, EKG and Echocardiogram. My interpretation of the radiology images/reports is: as above.    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[facility]) 03/19/2024   • Atherosclerosis of aorta ([facility]) 02/07/2024   • Reactive depression 12/21/2023   • Severe aortic stenosis 06/16/2023   • History of ST elevation myocardial infarction (STEMI) 04/18/2023   • Atherosclerosis of native coronary artery of native heart without angina pectoris 04/12/2023   • Ischemic cardiomyopathy 04/12/2023   • Anemia due to stage 4 chronic kidney disease  ([facility]) 01/03/2023   • Peripheral vascular disease, unspecified ([facility]) 01/03/2023   • Other spondylosis with myelopathy, cervical region 11/22/2022   • Balance disorder 11/22/2022   • At high risk for injury related to fall 11/22/2022   • Chronic pain of both knees    • Anticoagulated on warfarin 10/11/2022   • Hypophosphatemia 06/03/2022   • 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 06/03/2022   • Urinary frequency 04/29/2022   • Glomerulonephritis    • Other proteinuria    • Ambulatory dysfunction 04/22/2022   • Great toe pain, right 04/10/2022   • Surgical site infection 03/31/2022   • S/P cervical spinal fusion 03/31/2022   • Anemia 03/28/2022   • Head pain cephalgia 03/22/2022   • Muscle spasm 03/14/2022   • Goals of care,  counseling/discussion 03/11/2022   • Sinus bradycardia 03/10/2022   • Cervical myelopathy ([facility]) 03/05/2022   • Pes anserinus bursitis of right knee 12/14/2021   • IFG (impaired fasting glucose) 06/14/2021   • History of DVT of lower extremity 01/19/2021   • Mixed hyperlipidemia 10/27/2020   • Paroxysmal A-fib ([facility]) 09/08/2020   • Lower extremity edema 09/08/2020   • Primary osteoarthritis of left knee 06/05/2020   • Primary osteoarthritis of right knee 06/05/2020   • Stage 3b chronic kidney disease (CKD) ([facility]) 03/2020   • Insomnia 03/10/2017   • Lumbar herniated disc 03/10/2017   • Erectile dysfunction 02/09/2016   • Status post catheter ablation of atrial flutter 01/29/2015   • Primary hypertension 01/09/2015   • WILL (obstructive sleep apnea) 01/09/2015   • Factor V Leiden mutation ([facility]) 01/03/2014     Severe aortic stenosis; Ongoing TAVR workup    Plan:    Tian Garcia has severe symptomatic aortic stenosis. Based on their STS risk assessment, they will undergo the following testing for transcatheter aortic valve replacement: dobutamine stress echocardiogram to r/o pseudo aortic stenosis & further assess the severity of the aortic stenosis. If it truly is severe then we will order further testing including CTA CAP per TAVR protocol w/ overnight hydration, carotid US, cardiac cath updated, dental clearance (information above), updated CBC/BMP.    Once these studies have been completed, Tian Garcia will follow up in our office to review the results and confirm the suitability of proceeding with transcatheter aortic valve replacment.    Shared decision-making encounter occurred during this visit. Additionally, heart team evaluation of suitability for surgical replacement has been completed.      Tian Garcia was comfortable with our recommendations, and their questions were answered to their satisfaction.  We will continue to evaluate the patient, with a final surgical recommendation pending the above work up.   Thank you for allowing us to participate in the care of this patient.     The patient recently had a screening colonoscopy in 2021 via [facility].  Therefore GI referral is not indicated at this time.     SIGNATURE: Verna Simpson PA-C  DATE: December 5, 2024  TIME: 2:47 PM    * This note was completed in part utilizing [employer] direct voice recognition software.   Grammatical errors, random word insertion, spelling mistakes, and incomplete sentences may be an occasional consequence of the system secondary to software limitations, ambient noise and hardware issues. At the time of dictation, efforts were made to edit, clarify and /or correct errors. Please read the chart carefully and recognize, using context, where substitutions have occurred.  If you have any questions or concerns about the context, text or information contained within the body of this dictation, please contact myself, the provider, for further clarification.   Attestation signed by Micky Zelaya MD at 12/5/2024  5:11 PM:  Attending Attestation:    I supervised the Advanced Practitioner on 12/5/2024.  I discussed the case with the Advanced Practitioner, reviewed the note and agree.    The patient is a very pleasant 74-year-old man referred for evaluation of symptomatic aortic stenosis.  He has significant comorbidities including severe mixed cardiomyopathy with a EF 30%, atrial fibrillation, coronary artery disease with multiple PCI.  He complains of dyspnea with minimal exertion.  He denies angina or syncope.  I personally reviewed his diagnostic images, a TTE on 11/18/2024 shows EF 35%, it looks like one of the aortic valve leaflet does not move but they are to do, peak velocity 2.4 m/s, mean gradient 14 mmHg, EDELMIRA 0.78 cm², DVI 0.29, LVSI 17.9 mL/m², this could be consistent with low gradient aortic stenosis with reduced LVEF.  I explained the diagnosis to the patient and his wife, if he has indeed severe aortic stenosis I would  recommend TAVR.  As a first step I am going to send him for a dobutamine stress test to confirm the diagnosis of severe aortic stenosis, if that is indeed the case then we could move forward with preoperative testing and he will return back to schedule an elective TAVR.

## 2024-12-05 NOTE — PROGRESS NOTES
Consultation - Cardiac Surgery   Tian Garcia 74 y.o. male MRN: [id number]    Physician Requesting Consult: Dr. Wise    Reason for Consult / Principal Problem: Aortic stenosis, Non-Rheumatic    History of Present Illness: Tian Garcia is a 74 y.o. year old male who presents for evaluation of severe AS. Patient cardiac hx dates back ~15 years to which he was found to be in a fib during a colonoscopy & referred to o/p cardiology. Since then has had an ablation/DCCV & has done well. In 2023 he had a STEMI to which he had ROBERT x2 to LAD w/ residual 80% RCA lesion. At the time his EF dropped to 30-35%, he wore a LifeVest & 3 month f/u TTE showed EF recovery to 55-60% & moderate AS. He has followed w/ Dr. Wise since that time. He had a visit in August which showed a drop in his EF to 37% however f/u was still recommended. This past year he has been very active w/ his cardiac rehab then swimming recreationally then staged TKR w/ rehabilitation however a during his TKR in September he was noted to be in atrial fibrillation intra-op therefore cardiology was made aware & recommended o/p f/u. Dr. Wise got an updated TTE at 3 months rather than his normal 6 month showing progression of AS to severe & drop in EF to 33%. He has now been referred to EP as well as cardiac sx for atrial fibrillation management & surgical aortic stenosis evaluation.    Upon examination today, Mr. Garcia reports he is feeling poorly but stable. Admits to increase in his overall fatigue & fatigability over the past few months, SOB/MONTOYA w/ straining/strenuous activity & stairs, intermittent lightheadedness. He does not fele when he goes in & out of atrial fibrillation. Denies CP, palpitations, SOB, MONTOYA, LE edema b/l, orthopnea, PND, numbness/tinlging/paresthesias in UE or LE bilaterally, HA, lightheadeness, dizziness, presyncopal symptoms, hx syncopal events, N/V/D, hemoptysis, hematemesis, hematochezia, melena. Denies hx stroke, hx  cancer w/ chest wall radiation, hx blood loss anemia, hx varicose veins or vein stripping.    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 PSH includes cervical fusion, b/l RTKR, renal bx, lumber herniated disc, R cataract extraction, open hemicolectomy for diverticulitis, atrial flutter/fibrillation ablation, DCCV, I&D cervical abscess. FH significant for brother w/ SCD from MI age 69 as well as CHF/atrial fibrillation. Denies FH of  HTN, HL, valvular disease, DM, or aortic aneurysms. Patient is retired & worked as . Lives in a home w/ wife. Does not use an assist device for ambulation prior to knee replacements, currently using canes for support. Drives. Free time activities include swimming (prior to TKR), yardwork. Denies current or prior use of tobacco, ETOH, or drug use. Allergies include morphine with rxn GI intolerance. Cardiac pertinent medications include: Amiodarone 200mg QDay, Aspirin 81mg, Lipitor 80mg, Zetia 10mg, Lisinopril 5mg, Toprol 25mg BID, Torsemide 20mg, Coumadin dosing. Other medications can be reviewed in the patient chart.    Patient sees Dr. Santillan as his primary care physician, their prior visit documentation was reviewed at this visit. Patient sees Dr. Wise as his cardiologist, their proior visit documentation was reviewed at this visit. Patient sees Dr. Amin as his  electrophysiologist, their proior visit documentation was reviewed at this visit. Patient sees Dr. Covarrubias as his nephrologist, their proior visit documentation was reviewed at this visit. (+) upper dentures. LV w/I 6 months at [facility], Dr. Schoenley.    Past Medical History:  Past Medical History:   Diagnosis Date    Acute venous embolism and thrombosis of deep vessels of proximal lower extremity (HCC)     Last assessed: 5/18/15    JANEL (acute kidney injury) ([facility]) 4/22/2022    Arthritis     Cellulitis     LE    Chronic kidney disease 03/2020    Acute Kidney Injury    CPAP (continuous positive airway pressure) dependence     Factor V Leiden ([facility])     Forgetfulness     Gross hematuria 05/17/2022    Headache(784.0) 03/2022    Never till cervical  spine surgery    Heart murmur 03/2020    Told when in hospital    History of transfusion     [place] (hard of hearing)     Hypoalbuminemia 05/11/2022    Other acute osteomyelitis, other site ([facility]) 01/03/2023    Paroxysmal atrial fibrillation ([facility])     Last assessed: 11/2/15    Sleep apnea      Past Surgical History:   Past Surgical History:   Procedure Laterality Date    BACK SURGERY      Lower    CARDIAC CATHETERIZATION N/A 04/09/2023    Procedure: Cardiac pci;  Surgeon: Bird Cast MD;  [facility];  Service: Cardiology    CARDIAC CATHETERIZATION N/A 04/09/2023    Procedure: Cardiac Coronary Angiogram;  Surgeon: Bird Cast MD;  Location: [facility];  Service: Cardiology    CATARACT EXTRACTION      COLON SURGERY      COLONOSCOPY      INCISION AND DRAINAGE POSTERIOR SPINE N/A 04/01/2022    Procedure: Posterior cervical evacuation of postoperative collection and debridement with placement of drains C3-T1;  Surgeon: Rajeev Garcia MD;  Location: [facility];  Service: Neurosurgery    IR BIOPSY KIDNEY RANDOM  05/26/2022    IR TUNNELED DIALYSIS CATHETER PLACEMENT  05/23/2022    IR TUNNELED DIALYSIS CATHETER REMOVAL   06/02/2022    SC ARTHRD ANT INTERBODY MIN DSC CRV BELOW C2 N/A 03/11/2022    Procedure: Anterior cervical discectomy and fixation fusion C5/6 and C6/7; Posterior cervical decompression and instrumented fusion C3-T1;  Surgeon: Rajeev Garcia MD;  Location: [facility];  Service: Neurosurgery    SC ARTHRP KNE CONDYLE&PLATU MEDIAL&LAT COMPARTMENTS Right 4/8/2024    Procedure: ARTHROPLASTY KNEE TOTAL and all associated procedures;  Surgeon: Dot Galindo MD;  Location[facility];  Service: Orthopedics    SC ARTHRP KNE CONDYLE&PLATU MEDIAL&LAT COMPARTMENTS Left 9/23/2024    Procedure: ARTHROPLASTY KNEE TOTAL;  Surgeon: Dot Galindo MD;  Location[facility];  Service: Orthopedics    RENAL BIOPSY  6/2022    SPINE SURGERY  03/11/2022    Cervical myelopathy/ cervical fusion     Family History:  Family History   Problem Relation Age of Onset    Lung cancer Mother     Hearing loss Father     Emphysema Sister     Heart attack Brother     Atrial fibrillation Brother     Clotting disorder Son      Social History:  Social History     Substance and Sexual Activity   Alcohol Use Not Currently     Social History     Substance and Sexual Activity   Drug Use No     Social History     Tobacco Use   Smoking Status Never   Smokeless Tobacco Never       Home Medications:   Prior to Admission medications    Medication Sig Start Date End Date Taking? Authorizing Provider   amiodarone 200 mg tablet Take 1 tablet (200 mg total) by mouth 2 (two) times a day 12/4/24  Yes Derik Amin MD   amiodarone 200 mg tablet Take 1 tablet (200 mg total) by mouth daily Do not start before December 18, 2024. 12/18/24  Yes Derik Amin MD   Aspirin 81 MG CAPS Take by mouth   Yes Historical Provider, MD   atorvastatin (LIPITOR) 80 mg tablet TAKE 1 TABLET BY MOUTH EVERY EVENING 5/30/24  Yes Bernardo Jefferson MD   DULoxetine (CYMBALTA) 60 mg delayed release capsule TAKE 1 CAPSULE BY MOUTH EVERY DAY 8/12/24  Yes Aneta Santillan DO   ezetimibe  (ZETIA) 10 mg tablet TAKE 1 TABLET BY MOUTH EVERY DAY 8/26/24  Yes Seamus Lock MD   gabapentin (NEURONTIN) 300 mg capsule TAKE 1 CAPSULE BY MOUTH  DAILY AT BEDTIME 10/8/24  Yes Aneta Santillan DO   glucose blood (Accu-Chek Guide) test strip Use one new strip daily dx r73.01 2/28/24  Yes Aneta Santillan DO   lisinopril (ZESTRIL) 5 mg tablet Take 1 tablet (5 mg total) by mouth daily 8/16/24  Yes Seamus Lock MD   methocarbamol (ROBAXIN) 500 mg tablet Take 1 tablet (500 mg total) by mouth 3 (three) times a day 11/12/24  Yes Deepak Crane PA-C   metoprolol succinate (TOPROL-XL) 25 mg 24 hr tablet Take 1 tablet (25 mg total) by mouth 2 (two) times a day 11/19/24  Yes Seamus Lock MD   mometasone (ELOCON) 0.1 % cream APPLY TO AFFECTED AREA TOPICALLY EVERY DAY 2/7/24  Yes Aneta Santillan DO   nitroglycerin (NITROSTAT) 0.4 mg SL tablet Place 1 tablet (0.4 mg total) under the tongue every 5 (five) minutes as needed for chest pain 5/18/23  Yes Seamus Lock MD   potassium chloride (Klor-Con M20) 20 mEq tablet Take 1 tablet (20 mEq total) by mouth daily 8/16/24  Yes Seamus Lock MD   tamsulosin (FLOMAX) 0.4 mg Take 1 capsule (0.4 mg total) by mouth daily with dinner 8/26/24  Yes Aneta Santillan DO   torsemide (DEMADEX) 20 mg tablet Take 1 tablet (20 mg total) by mouth daily 11/8/24  Yes Seamus Lock MD   warfarin (COUMADIN) 5 mg tablet TAKE ONE-HALF TABLET BY MOUTH  DAILY , EXCEPT TAKE 1 TABLET BY  MOUTH ON WEDNESDAY AND SATURDAY 10/30/24  Yes Aneta Santillan DO   zolpidem (AMBIEN) 10 mg tablet TAKE 1 TABLET BY MOUTH DAILY AT  BEDTIME AS NEEDED FOR SLEEP 11/6/24  Yes Aneta Santillan DO   ascorbic acid (VITAMIN C) 500 MG tablet Take 1 tablet (500 mg total) by mouth 2 (two) times a day  Patient not taking: Reported on 12/4/2024 9/17/24 12/5/24  Deepak Crane PA-C   tirzepatide (Mounjaro) 2.5 MG/0.5ML INJECT 0.5 ML (2.5 MG TOTAL) UNDER THE SKIN EVERY 7 DAYS  Patient not taking: No sig  "reported 7/15/24 12/5/24  Aneta Santillan DO       Allergies:  Allergies   Allergen Reactions    Morphine GI Intolerance    Vitamin K Other (See Comments)     On coumadin-patient sensitive to vitamin K amounts in meds etc.       Review of Systems:     Review of Systems   Constitutional:  Positive for activity change and fatigue. Negative for diaphoresis and unexpected weight change.   HENT: Negative.  Negative for dental problem.    Respiratory:  Positive for shortness of breath. Negative for chest tightness and wheezing.    Cardiovascular: Negative.  Negative for chest pain, palpitations and leg swelling.   Gastrointestinal: Negative.  Negative for blood in stool, constipation, diarrhea, nausea and vomiting.   Genitourinary: Negative.    Musculoskeletal: Negative.  Negative for arthralgias, back pain, gait problem and myalgias.   Skin: Negative.    Neurological: Negative.  Negative for dizziness, syncope, weakness, light-headedness, numbness and headaches.   Hematological: Negative.  Does not bruise/bleed easily.   All other systems reviewed and are negative.      Vital Signs:     Vitals:    12/05/24 1441 12/05/24 1444   BP: 101/69 96/61   BP Location: Left arm Right arm   Patient Position: Sitting Sitting   Cuff Size: Standard Standard   Pulse: 60    SpO2: 100%    Weight: 106 kg (233 lb)    Height: 5' 11\" (1.803 m)        Physical Exam:     Physical Exam  Constitutional:       General: He is not in acute distress.     Appearance: Normal appearance. He is well-developed. He is obese. He is not ill-appearing or toxic-appearing.      Comments: Sitting in chair in NAD   HENT:      Head: Normocephalic and atraumatic.      Mouth/Throat:      Dentition: Normal dentition. Does not have dentures.      Pharynx: Uvula midline.   Neck:      Vascular: No carotid bruit or JVD.      Trachea: Trachea normal.   Cardiovascular:      Rate and Rhythm: Normal rate and regular rhythm.      Chest Wall: PMI is not displaced.      Heart sounds: " "S1 normal and S2 normal. Murmur heard.      Systolic murmur is present with a grade of 4/6.      No friction rub. No S3 or S4 sounds.      Comments: No heaves/lifts on palpation  Pulmonary:      Effort: Pulmonary effort is normal. No accessory muscle usage or respiratory distress.      Breath sounds: Normal breath sounds. No wheezing, rhonchi or rales.   Abdominal:      General: Bowel sounds are normal. There is no distension.      Palpations: Abdomen is not rigid. There is no mass.      Tenderness: There is no abdominal tenderness. There is no guarding or rebound.   Musculoskeletal:      Cervical back: Normal range of motion and neck supple.   Skin:     General: Skin is warm and dry.      Findings: No bruising, petechiae or rash.      Nails: There is no clubbing.      Comments: Trace edema b/l LE, no varicosities appreciated to b/l LE   Neurological:      Mental Status: He is alert and oriented to person, place, and time.      Cranial Nerves: No cranial nerve deficit.      Sensory: No sensory deficit.      Comments: No focal deficit appreciated       Lab Results:               Invalid input(s): \"LABGLOM\"      Lab Results   Component Value Date    HGBA1C 6.2 (H) 04/09/2023     Lab Results   Component Value Date    CKTOTAL 36 (L) 04/26/2022       Imaging Studies:     Echocardiogram: EF 33%, severe AS (mean 0.78cm2, mean 14), mild AI, mild MR    Cardiac cath 4/2023: s/p ROBERT x2 to prox/mid LAD, residual 80% RCA    EKG: rate controlled a fib, RBBB    Results Review Statement: I personally reviewed the following image studies in PACS and associated radiology reports: cardiac cath, EKG and Echocardiogram. My interpretation of the radiology images/reports is: as above.    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[facility]) 03/19/2024    Atherosclerosis of aorta ([facility]) 02/07/2024    Reactive depression 12/21/2023    Severe aortic stenosis 06/16/2023    History of ST elevation myocardial infarction (STEMI) 04/18/2023    Atherosclerosis of native coronary artery of native heart without angina pectoris 04/12/2023    Ischemic cardiomyopathy 04/12/2023    Anemia due to stage 4 chronic kidney disease  ([facility]) 01/03/2023    Peripheral vascular disease, unspecified ([facility]) 01/03/2023    Other spondylosis with myelopathy, cervical region 11/22/2022    Balance disorder 11/22/2022    At high risk for injury related to fall 11/22/2022    Chronic pain of both knees     Anticoagulated on warfarin 10/11/2022    Hypophosphatemia 06/03/2022    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 06/03/2022    Urinary frequency 04/29/2022    Glomerulonephritis     Other proteinuria     Ambulatory dysfunction 04/22/2022    Great toe pain, right 04/10/2022    Surgical site infection 03/31/2022    S/P cervical spinal fusion 03/31/2022    Anemia 03/28/2022    Head pain cephalgia 03/22/2022    Muscle spasm 03/14/2022    Goals of care, counseling/discussion 03/11/2022    Sinus bradycardia 03/10/2022    Cervical myelopathy ([facility]) 03/05/2022    Pes anserinus bursitis of right knee 12/14/2021    IFG (impaired fasting glucose) 06/14/2021    History of DVT of lower extremity 01/19/2021    Mixed hyperlipidemia 10/27/2020    Paroxysmal A-fib ([facility]) 09/08/2020    Lower extremity edema 09/08/2020    Primary osteoarthritis of left knee 06/05/2020    Primary osteoarthritis of right knee 06/05/2020    Stage 3b chronic kidney disease (CKD) ([facility]) 03/2020    Insomnia 03/10/2017    Lumbar herniated disc 03/10/2017    Erectile dysfunction 02/09/2016    Status post  catheter ablation of atrial flutter 01/29/2015    Primary hypertension 01/09/2015    WILL (obstructive sleep apnea) 01/09/2015    Factor V Leiden mutation (HCC) 01/03/2014     Severe aortic stenosis; Ongoing TAVR workup    Plan:    Tian Garcia has severe symptomatic aortic stenosis. Based on their STS risk assessment, they will undergo the following testing for transcatheter aortic valve replacement: dobutamine stress echocardiogram to r/o pseudo aortic stenosis & further assess the severity of the aortic stenosis. If it truly is severe then we will order further testing including CTA CAP per TAVR protocol w/ overnight hydration, carotid US, cardiac cath updated, dental clearance (information above), updated CBC/BMP.    Once these studies have been completed, Tian Garcia will follow up in our office to review the results and confirm the suitability of proceeding with transcatheter aortic valve replacment.    Shared decision-making encounter occurred during this visit. Additionally, heart team evaluation of suitability for surgical replacement has been completed.      Tian Garcia was comfortable with our recommendations, and their questions were answered to their satisfaction.  We will continue to evaluate the patient, with a final surgical recommendation pending the above work up.  Thank you for allowing us to participate in the care of this patient.     The patient recently had a screening colonoscopy in 2021 via [facility].  Therefore GI referral is not indicated at this time.     SIGNATURE: Verna Simpson PA-C  DATE: December 5, 2024  TIME: 2:47 PM    * This note was completed in part utilizing m-Viro[employer] direct voice recognition software.   Grammatical errors, random word insertion, spelling mistakes, and incomplete sentences may be an occasional consequence of the system secondary to software limitations, ambient noise and hardware issues. At the time of dictation, efforts were made to edit,  clarify and /or correct errors. Please read the chart carefully and recognize, using context, where substitutions have occurred.  If you have any questions or concerns about the context, text or information contained within the body of this dictation, please contact myself, the provider, for further clarification.

## 2024-12-06 ENCOUNTER — OFFICE VISIT (OUTPATIENT)
Dept: PHYSICAL THERAPY | Facility: CLINIC | Age: 74
End: 2024-12-06
Payer: COMMERCIAL

## 2024-12-06 DIAGNOSIS — N13.8 BPH WITH OBSTRUCTION/LOWER URINARY TRACT SYMPTOMS: ICD-10-CM

## 2024-12-06 DIAGNOSIS — Z96.652 S/P TOTAL KNEE ARTHROPLASTY, LEFT: Primary | ICD-10-CM

## 2024-12-06 DIAGNOSIS — N40.1 BPH WITH OBSTRUCTION/LOWER URINARY TRACT SYMPTOMS: ICD-10-CM

## 2024-12-06 PROCEDURE — 97110 THERAPEUTIC EXERCISES: CPT

## 2024-12-06 PROCEDURE — 97164 PT RE-EVAL EST PLAN CARE: CPT

## 2024-12-06 RX ORDER — TAMSULOSIN HYDROCHLORIDE 0.4 MG/1
0.4 CAPSULE ORAL
Qty: 90 CAPSULE | Refills: 1 | Status: SHIPPED | OUTPATIENT
Start: 2024-12-06

## 2024-12-06 NOTE — PROGRESS NOTES
"Re-eval Note     Today's date: 2024  Patient name: Tian Garcia  : 1950  MRN: [id number]  Referring provider: Deepak Crane*  Dx:   Encounter Diagnosis     ICD-10-CM    1. S/P total knee arthroplasty, left  Z96.652           Start Time: 0848  Stop Time: 930  Total time in clinic (min): 42 minutes    Subjective: pt reports that things are going well with the knee. The right knee he feels as happy as he can be, in the L knee he was performing STS one day and he felt a pop in the knee, no bruising observed. Pt reports that some manual therapy w typical therapist has helped with the pain on the lateral aspect of the knee however now the medial aspect of the L knee is bothering him.    Pain L knee  Current: 0/10  Best: 0/10  Worst: 8/10     Pt goals: get walking an function\"back to normal\"      Objective: See treatment diary below  Knee flexion: 116 degrees PROM 100 AROM  Knee extension: -3 deg -6 AROM  5x sts: 14 sec with UE; without UE only to 2 repetitions then painful;  16.84s able to perform first 4 reps w/o UE then UE required  TU sec without AD;  11.81 s no AD  10mwt: 0.92 m/s SPC    Knee extension L 4/5 R 5/5  Kne flexion L 4/5 R 5/5    Assessment: Tolerated treatment well. Patient would benefit from continued PT in the home pt is able to ambulate without the SPC however utilizes either a SPC or a Rolator. Pt reports he only utilizes Rolator when he is going to have to carry multiple objects with him. Pt has demonstrated improving ROM both passively and actively as well as improving strength. L knee flexion and knee extension have both improved however remain asymmetrical as compared to the R knee. Pt has improved TUG time by > MDC and is below the cut off fall risk of 13.5 s. Pts 10mwt however still places pt at risk of falls as scores < 1 m/s increases fall risk. Pt unable to perform 5 STS w/o the use of hands at this time however has improved from 2 reps to 4 since last " "progress note. Pt would continue to benefit from skilled physical therapy to address ROM, strength, and functional mobility as well as reducing pts fall risk.      Plan: TE, TA, NMR, stretching, strengthening, ROM, flexibility   POC start date 12/6/24  POC end date 1/31/25  2x wk 8 weeks           Goals:  Pt will be able to ambulate around home and community with SPC in 4 weeks MET  Pt will have 115 degrees knee flexion ROM in 4 weeks MET  Pt will improve knee extension recruitment to 4/5 MMT within 4 weeks MET     LTG  Pt will be below fall risk cut offs within 8 weeks MET w TUG not met w 10mwt  Pt will walk at 1.0 m/s within 8 weeks NoT MET  Pt will be able to complete STS transfer 5 imes in a row wihtout UE in 8 weeks Progressed      New goals as of 12/6    STG  Pt will demonstrate 115 deg of passive knee flexion within 4 weeks  Pt will demonstrate symmetrical knee strength R=L within 4 weeks  Pt will continue independence with HEP for continued carryover between sessions within 4 weeks    LTG  Pt will be below fall risk cut offs within 8 weeks MET w TUG not met w 10mwt  Pt will walk at 1.0 m/s with out AD within 8 weeks   Pt will be able to complete STS transfer 5 imes in a row at age matched norms wihtout UE in 8 weeks        Short Term Goal Expiration Date:(1/3/25)  Long Term Goal Expiration Date: (1/31/25)  POC Expiration Date: (1/31/25)      POC expires Unit limit Auth Expiration date PT/OT/ST + Visit Limit?                                 Visit/Unit Tracking  AUTH Status:  Date 10/7 10/11 10/14 10/18 10/22 10/23 10/29 11/1 11/5 11/8 11/22    Used 2 3 4 5 6 7 8 Waiting on auth  3 4    Remaining      2 1 0   5 4     11/26 11/29              6 7              2 1                  Precautions        Manuals 11/26 11/29 12/6 11/5 11/8 11/22   Cross friction massage ITB left  EM    5 min                               Neuro Re-Ed    Tug  10mwt      Step ups  8\" step with 10x ea fwd    10x 6\" //bars   Lateral step " "ups      10x 6\" //bars   Modified lunge     4 laps     Side stepping GTB at knees in slight squat     4 laps gtb at knees    Blue foam balance and ball toss         hurdles         Squats    2x10     lunges 4 laps //bars 4 laps //bars       HKM 5 laps Prn UE //bars        Ther Ex   5x sts    Strength and ROM assessment      NUstep      10 min L7   S/l hip abd    2x10 w/ #2 w/ 3 sec hold     bridges         Heel slide          Heel raise 30x    30x    Stdg Hip Abd         Stdg Hip Ext         Recumbent bike         treadmill 5 min at 1.2 mph b/l UE 5 min at 1.4 mph 5% incline 5 min flat  5 min 5% incline  1.4 mph BUE use 5 min flat  5 min incline  1.5 mph  10 min 1.4 mph    LAQ         TKE  Blue TB - 2x10  GTB 2x10     Ther Activity                           Gait Training                           Modalities                                        Start Time: 0930  Stop Time: 1015  Total time in clinic (min): 45 minutes                                       "

## 2024-12-09 ENCOUNTER — PREP FOR PROCEDURE (OUTPATIENT)
Dept: CARDIOLOGY CLINIC | Facility: CLINIC | Age: 74
End: 2024-12-09

## 2024-12-09 DIAGNOSIS — I35.0 NONRHEUMATIC AORTIC VALVE STENOSIS: ICD-10-CM

## 2024-12-09 DIAGNOSIS — I48.0 PAROXYSMAL A-FIB (HCC): Primary | ICD-10-CM

## 2024-12-09 DIAGNOSIS — I25.5 ISCHEMIC CARDIOMYOPATHY: ICD-10-CM

## 2024-12-09 NOTE — TELEPHONE ENCOUNTER
Patient scheduled for DIANA/AFIB Ablation /PFA on 01/02/25 at [facility] with Dr. AMIN.      Instructions sent to patient through [employer].      Patient aware of all general instructions.    Medication holds:   TORSEMIDE: DO NOT take this medication the morning of the procedure.     Lisinopril - Do not take day before and morning of procedure.      MOUNJARO: If apply WEEKLY, DO NOT apply this medicine for 7 day’s before your procedure including the day of the procedure.     warfarin (COUMADIN) - No holds, per provider protocol.    Labs to be done on 12/28/24   CMP / CBC (FASTING 8 HOURS)    Dr. Amin, he will be having a Echo stress test, dobutamine on 12/31/24 is that ok?      Thank you,  Elizabeth Ba

## 2024-12-10 ENCOUNTER — OFFICE VISIT (OUTPATIENT)
Dept: OBGYN CLINIC | Facility: CLINIC | Age: 74
End: 2024-12-10

## 2024-12-10 ENCOUNTER — HOSPITAL ENCOUNTER (OUTPATIENT)
Dept: RADIOLOGY | Facility: HOSPITAL | Age: 74
Discharge: HOME | End: 2024-12-10
Attending: ORTHOPAEDIC SURGERY

## 2024-12-10 ENCOUNTER — ANTICOAG VISIT (OUTPATIENT)
Dept: FAMILY MEDICINE CLINIC | Facility: HOSPITAL | Age: 74
End: 2024-12-10

## 2024-12-10 VITALS
WEIGHT: 233 LBS | HEART RATE: 69 BPM | SYSTOLIC BLOOD PRESSURE: 111 MMHG | HEIGHT: 71 IN | BODY MASS INDEX: 32.62 KG/M2 | DIASTOLIC BLOOD PRESSURE: 80 MMHG

## 2024-12-10 DIAGNOSIS — Z96.652 S/P TOTAL KNEE REPLACEMENT USING CEMENT, LEFT: Primary | ICD-10-CM

## 2024-12-10 DIAGNOSIS — Z96.652 S/P TOTAL KNEE REPLACEMENT USING CEMENT, LEFT: ICD-10-CM

## 2024-12-10 PROCEDURE — 99024 POSTOP FOLLOW-UP VISIT: CPT | Performed by: PHYSICIAN ASSISTANT

## 2024-12-10 PROCEDURE — 99024 POSTOP FOLLOW-UP VISIT: CPT | Performed by: ORTHOPAEDIC SURGERY

## 2024-12-10 NOTE — PROGRESS NOTES
74 y.o.male presents for 2 weeks postoperative visit status post left TKA. Patient denies any chest pain, shortness or breath or calf pain. Pain is well controlled with medication.  Patient does note some pain on the medial aspect of his knee states the lateral pain has since resolved he continues to physical therapy exercises twice weekly he was with the assistance of a cane due to balance issues.    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, 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, on steroids for prolonged period    Ambulatory dysfunction     cane use    Anemia     BPH (benign prostatic hyperplasia)     CHF (congestive heart failure) ([facility])     Chronic kidney disease     stage 3b, baseline Cr 2.2    DDD (degenerative disc disease), cervical     DDD (degenerative disc disease), lumbar     Diverticulosis     Factor V Leiden ([facility])     History of DVT (deep vein thrombosis)     from Facotr V, on Coumadin    [place] (hard of hearing)     Hyperlipidemia     Hypertension     Insomnia     Ischemic cardiomyopathy     EF 33%    MRSA carrier     Neuropathy     Obesity     WILL on CPAP     Osteoarthritis     Other acute osteomyelitis, other site ([facility]) 01/03/2023    Paroxysmal atrial fibrillation ([facility])     s/p ablation, s/p DCCV, Coumadin, on Amiodarone for    RBBB     Rotator cuff tear     right    Severe aortic stenosis     Steroid-induced hyperglycemia     requiring insulin, w/ MRSA infection 2022, resolved       Past Surgical History  Past Surgical History:   Procedure Laterality Date    CARDIAC CATHETERIZATION N/A 04/09/2023    Procedure: Cardiac pci;  Surgeon: Bird Cast MD;  Location: [facility];  Service: Cardiology    CARDIAC CATHETERIZATION N/A 04/09/2023    Procedure: Cardiac Coronary Angiogram;  Surgeon: Bird Cast MD;   Location: [facility] CARDIAC [facility];  Service: Cardiology    CATARACT EXTRACTION Right     COLON SURGERY      COLONOSCOPY      INCISION AND DRAINAGE POSTERIOR SPINE N/A 04/01/2022    Procedure: Posterior cervical evacuation of postoperative collection and debridement with placement of drains C3-T1;  Surgeon: Rajeev Garcia MD;  Location: [facility] MAIN [facility];  Service: Neurosurgery    IR BIOPSY KIDNEY RANDOM  05/26/2022    IR TUNNELED DIALYSIS CATHETER PLACEMENT  05/23/2022    IR TUNNELED DIALYSIS CATHETER REMOVAL  06/02/2022    LUMBAR SPINE SURGERY      herniated disc    SC ARTHRD ANT INTERBODY MIN DSC CRV BELOW C2 N/A 03/11/2022    Procedure: Anterior cervical discectomy and fixation fusion C5/6 and C6/7; Posterior cervical decompression and instrumented fusion C3-T1;  Surgeon: Rajeev Garcia MD;  Location: [facility];  Service: Neurosurgery    SC ARTHRP KNE CONDYLE&PLATU MEDIAL&LAT COMPARTMENTS Right 04/08/2024    Procedure: ARTHROPLASTY KNEE TOTAL and all associated procedures;  Surgeon: Dot Galindo MD;  Location: [facility];  Service: Orthopedics    SC ARTHRP KNE CONDYLE&PLATU MEDIAL&LAT COMPARTMENTS Left 09/23/2024    Procedure: ARTHROPLASTY KNEE TOTAL;  Surgeon: Dot Galindo MD;  Location[facility];  Service: Orthopedics    RENAL BIOPSY  6/2022    SPINE SURGERY  03/11/2022    Cervical myelopathy/ cervical fusion       Current Medications  Current Outpatient Medications on File Prior to Visit   Medication Sig Dispense Refill    amiodarone 200 mg tablet Take 1 tablet (200 mg total) by mouth 2 (two) times a day 28 tablet 0    [START ON 12/18/2024] amiodarone 200 mg tablet Take 1 tablet (200 mg total) by mouth daily Do not start before December 18, 2024. 30 tablet 3    Aspirin 81 MG CAPS Take by mouth      atorvastatin (LIPITOR) 80 mg tablet TAKE 1 TABLET BY MOUTH EVERY EVENING 90 tablet 1    DULoxetine (CYMBALTA) 60 mg delayed release capsule TAKE 1 CAPSULE BY MOUTH EVERY DAY 90 capsule 1     ezetimibe (ZETIA) 10 mg tablet TAKE 1 TABLET BY MOUTH EVERY DAY 90 tablet 3    gabapentin (NEURONTIN) 300 mg capsule TAKE 1 CAPSULE BY MOUTH  DAILY AT BEDTIME 90 capsule 1    glucose blood (Accu-Chek Guide) test strip Use one new strip daily dx r73.01 100 strip 1    lisinopril (ZESTRIL) 5 mg tablet Take 1 tablet (5 mg total) by mouth daily 90 tablet 3    methocarbamol (ROBAXIN) 500 mg tablet Take 1 tablet (500 mg total) by mouth 3 (three) times a day 30 tablet 0    metoprolol succinate (TOPROL-XL) 25 mg 24 hr tablet Take 1 tablet (25 mg total) by mouth 2 (two) times a day 180 tablet 3    mometasone (ELOCON) 0.1 % cream APPLY TO AFFECTED AREA TOPICALLY EVERY DAY 45 g 1    nitroglycerin (NITROSTAT) 0.4 mg SL tablet Place 1 tablet (0.4 mg total) under the tongue every 5 (five) minutes as needed for chest pain 30 tablet 0    potassium chloride (Klor-Con M20) 20 mEq tablet Take 1 tablet (20 mEq total) by mouth daily 90 tablet 3    tamsulosin (FLOMAX) 0.4 mg TAKE 1 CAPSULE BY MOUTH EVERY DAY WITH DINNER 90 capsule 1    torsemide (DEMADEX) 20 mg tablet Take 1 tablet (20 mg total) by mouth daily 90 tablet 3    warfarin (COUMADIN) 5 mg tablet TAKE ONE-HALF TABLET BY MOUTH  DAILY , EXCEPT TAKE 1 TABLET BY  MOUTH ON WEDNESDAY AND SATURDAY 58 tablet 1    zolpidem (AMBIEN) 10 mg tablet TAKE 1 TABLET BY MOUTH DAILY AT  BEDTIME AS NEEDED FOR SLEEP 90 tablet 0     No current facility-administered medications on file prior to visit.       Recent Labs (HCT,HGB,PT,INR,ESR,CRP,GLU,HgA1C)  0   Lab Value Date/Time    HCT 33.8 (L) 09/24/2024 0438    HCT 39.5 01/23/2015 0440    HGB 11.0 (L) 09/24/2024 0438    HGB 12.9 01/23/2015 0440    WBC 11.58 (H) 09/24/2024 0438    WBC 8.43 01/23/2015 0440    INR 2.7 (H) 12/09/2024 0947    INR 1.05 09/23/2024 1136    INR 2.2 (H) 01/11/2018 1405     (H) 05/10/2022 1949    CRP 8.2 (H) 05/10/2022 1950    GLUCOSE 102 (H) 10/25/2017 0951    HGBA1C 6.2 (H) 04/09/2023 1902    HGBA1C 6.2 (H) 11/02/2021  1302           Body mass index is 32.5 kg/m².  Wt Readings from Last 3 Encounters:   12/10/24 106 kg (233 lb)   12/05/24 106 kg (233 lb)   12/04/24 107 kg (235 lb)       Physical exam   General: Awake, Alert, Oriented   Eyes: Pupils equal, round and reactive to light    Heart: regular rate and rhythm   Lungs: No audible wheezing   Abdomen: soft  left Lower extremity      Incision well approximated without erythema no tenderness to palpation    Full knee extension 110 of flexion   Active ankle dorsal and plantarflexion without pain, calf nontender palpation   sensation mildly decreased daniel-incisionally otherwise intact   Distal pulses present          Assessment:  Status post 11 weeks left TKA    Plan:  Weight bearing as tolerated left Lower extremity  Physical therapy  Voltaren gel as needed for medial sided pain of the left knee  [employer] antibiotic prophylaxis recommended  Pain medication as needed  Follow-up in 3 months and repeat x-rays left knee

## 2024-12-13 ENCOUNTER — OFFICE VISIT (OUTPATIENT)
Dept: PHYSICAL THERAPY | Facility: CLINIC | Age: 74
End: 2024-12-13
Payer: COMMERCIAL

## 2024-12-13 DIAGNOSIS — Z96.652 S/P TOTAL KNEE ARTHROPLASTY, LEFT: Primary | ICD-10-CM

## 2024-12-13 PROCEDURE — 97110 THERAPEUTIC EXERCISES: CPT | Performed by: PHYSICAL THERAPIST

## 2024-12-13 PROCEDURE — 97112 NEUROMUSCULAR REEDUCATION: CPT | Performed by: PHYSICAL THERAPIST

## 2024-12-13 NOTE — PROGRESS NOTES
"Daily Note     Today's date: 2024  Patient name: Tian Garcia  : 1950  MRN: [id number]  Referring provider: Deepak Crane*  Dx:   Encounter Diagnosis     ICD-10-CM    1. S/P total knee arthroplasty, left  Z96.652                      Subjective: Patient feels that his knees are improving with less pain overall      Objective: See treatment diary below      Assessment: Pt was able to hold balance during SLS moment better than in any other session this date.       Plan: Continue per plan of care.      Short Term Goal Expiration Date:(1/3/25)  Long Term Goal Expiration Date: (25)  POC Expiration Date: (25)      POC expires Unit limit Auth Expiration date PT/OT/ST + Visit Limit?                                 Visit/Unit Tracking  AUTH Status:  Date 10/7 10/11 10/14 10/18 10/22 10/23 10/29 11/1 11/5 11/8 11/22    Used 2 3 4 5 6 7 8 Waiting on auth  3 4    Remaining      2 1 0   5 4                  6 7 1             2 1 7                 Precautions        Manuals    Cross friction massage ITB left  EM    5 min                               Neuro Re-Ed    Tug  10mwt      Step ups  8\" step with 10x ea fwd  8\" fwd/lat 15x ea  10x 6\" //bars   Lateral step ups      10x 6\" //bars   Modified lunge     4 laps     Side stepping GTB at knees in slight squat     4 laps gtb at knees    Blue foam balance and ball toss         hurdles    5 laps fwd wihtut UE     Squats         lunges 4 laps //bars 4 laps //bars  5 laps fwd with b/l UE     HKM 5 laps Prn UE //bars   5 laps without UE     Ther Ex   5x sts    Strength and ROM assessment      NUstep      10 min L7   S/l hip abd         bridges         Heel slide          Heel raise 30x    30x    Stdg Hip Abd         Stdg Hip Ext         Recumbent bike         treadmill 5 min at 1.2 mph b/l UE 5 min at 1.4 mph 5% incline 5 min flat  5 min 5% incline  1.4 mph BUE use  5 min 5% inclin 1.6 mph b/l uE 10 " min 1.4 mph    LAQ         TKE  Blue TB - 2x10  Blue TB - 20x ea      Ther Activity                           Gait Training                           Modalities                                        Start Time: 0930  Stop Time: 1015  Total time in clinic (min): 45 minutes

## 2024-12-17 ENCOUNTER — OFFICE VISIT (OUTPATIENT)
Dept: PHYSICAL THERAPY | Facility: CLINIC | Age: 74
End: 2024-12-17
Payer: COMMERCIAL

## 2024-12-17 DIAGNOSIS — Z96.652 S/P TOTAL KNEE ARTHROPLASTY, LEFT: Primary | ICD-10-CM

## 2024-12-17 PROCEDURE — 97110 THERAPEUTIC EXERCISES: CPT | Performed by: PHYSICAL THERAPIST

## 2024-12-17 PROCEDURE — 97112 NEUROMUSCULAR REEDUCATION: CPT | Performed by: PHYSICAL THERAPIST

## 2024-12-17 NOTE — PROGRESS NOTES
"Daily Note     Today's date: 2024  Patient name: Tian Garcia  : 1950  MRN: [id number]  Referring provider: Deepak Crane*  Dx:   Encounter Diagnosis     ICD-10-CM    1. S/P total knee arthroplasty, left  Z96.652                      Subjective: Patient reports continued improvements in knee pain       Objective: See treatment diary below      Assessment: Patient continues to have tightness noted around his lateral aspect of his knee in which therapist again attempted cross friction massage over ITB. Pt does consistently report reduction in pain after manual therapy. No palpable soft tissue tightenings around medial aspect of knee. Overal improved staility noted in SLS moment on b/l Les.       Plan: Continue per plan of care.      Short Term Goal Expiration Date:(1/3/25)  Long Term Goal Expiration Date: (25)  POC Expiration Date: (25)      POC expires Unit limit Auth Expiration date PT/OT/ST + Visit Limit?                                 Visit/Unit Tracking  AUTH Status:  Date 10/7 10/11 10/14 10/18 10/22 10/23 10/29 11/1 11/5 11/8 11/22    Used 2 3 4 5 6 7 8 Waiting on auth  3 4    Remaining      2 1 0   5 4                 6 7 1 2            2 1 7 6                Precautions        Manuals     Cross friction massage ITB left  EM   EM                               Neuro Re-Ed    Tug  10mwt      Step ups  8\" step with 10x ea fwd  8\" fwd/lat 15x ea 8\" fwd 15x ea     Lateral step ups         Modified lunge     3 laps parallel bars    Side stepping GTB at knees in slight squat     Otb at knees in slight squat - 5 laps    Blue foam balance and ball toss         hurdles    5 laps fwd wihtut UE 5 laps fwd, 3 laps lat    Squats         lunges 4 laps //bars 4 laps //bars  5 laps fwd with b/l UE     HKM 5 laps Prn UE //bars   5 laps without UE     Ther Ex   5x sts    Strength and ROM assessment      NUstep         S/l hip abd       "   bridges         Heel slide          Heel raise 30x    30x    Stdg Hip Abd         Stdg Hip Ext         Recumbent bike         treadmill 5 min at 1.2 mph b/l UE 5 min at 1.4 mph 5% incline 5 min flat  5 min 5% incline  1.4 mph BUE use  5 min 5% inclin 1.6 mph b/l uE 5 min 5% inclin 1.6 mph b/l uE    LAQ         TKE  Blue TB - 2x10  Blue TB - 20x ea  Blue TB - 20x ea    Ther Activity                           Gait Training                           Modalities                                        Start Time: 0930  Stop Time: 1015  Total time in clinic (min): 45 minutes

## 2024-12-18 ENCOUNTER — ANTICOAG VISIT (OUTPATIENT)
Dept: FAMILY MEDICINE CLINIC | Facility: HOSPITAL | Age: 74
End: 2024-12-18

## 2024-12-24 ENCOUNTER — APPOINTMENT (OUTPATIENT)
Dept: PHYSICAL THERAPY | Facility: CLINIC | Age: 74
End: 2024-12-24
Payer: COMMERCIAL

## 2024-12-24 ENCOUNTER — ANTICOAG VISIT (OUTPATIENT)
Dept: FAMILY MEDICINE CLINIC | Facility: HOSPITAL | Age: 74
End: 2024-12-24

## 2024-12-28 LAB
ALBUMIN SERPL-MCNC: 4.5 G/DL (ref 3.8–4.8)
ALP SERPL-CCNC: 95 IU/L (ref 44–121)
ALT SERPL-CCNC: 17 IU/L (ref 0–44)
AST SERPL-CCNC: 16 IU/L (ref 0–40)
BASOPHILS # BLD AUTO: 0 X10E3/UL (ref 0–0.2)
BASOPHILS NFR BLD AUTO: 1 %
BILIRUB SERPL-MCNC: 0.7 MG/DL (ref 0–1.2)
BUN SERPL-MCNC: 35 MG/DL (ref 8–27)
BUN/CREAT SERPL: 21 (ref 10–24)
CALCIUM SERPL-MCNC: 9.6 MG/DL (ref 8.6–10.2)
CHLORIDE SERPL-SCNC: 102 MMOL/L (ref 96–106)
CO2 SERPL-SCNC: 23 MMOL/L (ref 20–29)
CREAT SERPL-MCNC: 1.68 MG/DL (ref 0.76–1.27)
EGFR: 43 ML/MIN/1.73
EOSINOPHIL # BLD AUTO: 0.1 X10E3/UL (ref 0–0.4)
EOSINOPHIL NFR BLD AUTO: 2 %
ERYTHROCYTE [DISTWIDTH] IN BLOOD BY AUTOMATED COUNT: 13.3 % (ref 11.6–15.4)
GLOBULIN SER-MCNC: 2.6 G/DL (ref 1.5–4.5)
GLUCOSE SERPL-MCNC: 110 MG/DL (ref 70–99)
HCT VFR BLD AUTO: 39.5 % (ref 37.5–51)
HGB BLD-MCNC: 12.7 G/DL (ref 13–17.7)
IMM GRANULOCYTES # BLD: 0 X10E3/UL (ref 0–0.1)
IMM GRANULOCYTES NFR BLD: 0 %
INR PPP: 3.8 (ref 0.9–1.2)
LYMPHOCYTES # BLD AUTO: 1.2 X10E3/UL (ref 0.7–3.1)
LYMPHOCYTES NFR BLD AUTO: 20 %
MCH RBC QN AUTO: 29.9 PG (ref 26.6–33)
MCHC RBC AUTO-ENTMCNC: 32.2 G/DL (ref 31.5–35.7)
MCV RBC AUTO: 93 FL (ref 79–97)
MONOCYTES # BLD AUTO: 0.6 X10E3/UL (ref 0.1–0.9)
MONOCYTES NFR BLD AUTO: 9 %
NEUTROPHILS # BLD AUTO: 4 X10E3/UL (ref 1.4–7)
NEUTROPHILS NFR BLD AUTO: 68 %
PLATELET # BLD AUTO: 219 X10E3/UL (ref 150–450)
POTASSIUM SERPL-SCNC: 4.2 MMOL/L (ref 3.5–5.2)
PROT SERPL-MCNC: 7.1 G/DL (ref 6–8.5)
PROTHROMBIN TIME: 37.4 SEC (ref 9.1–12)
RBC # BLD AUTO: 4.25 X10E6/UL (ref 4.14–5.8)
SODIUM SERPL-SCNC: 142 MMOL/L (ref 134–144)
WBC # BLD AUTO: 6 X10E3/UL (ref 3.4–10.8)

## 2024-12-29 ENCOUNTER — RESULTS FOLLOW-UP (OUTPATIENT)
Dept: CARDIOLOGY CLINIC | Facility: CLINIC | Age: 74
End: 2024-12-29

## 2024-12-31 ENCOUNTER — HOSPITAL ENCOUNTER (OUTPATIENT)
Dept: NON INVASIVE DIAGNOSTICS | Facility: HOSPITAL | Age: 74
Discharge: HOME | End: 2024-12-31
Payer: COMMERCIAL

## 2024-12-31 VITALS — BODY MASS INDEX: 32.62 KG/M2 | HEIGHT: 71 IN | WEIGHT: 233 LBS

## 2024-12-31 DIAGNOSIS — I35.0 SEVERE AORTIC STENOSIS: ICD-10-CM

## 2024-12-31 PROCEDURE — 93350 STRESS TTE ONLY: CPT | Performed by: INTERNAL MEDICINE

## 2024-12-31 PROCEDURE — 93350 STRESS TTE ONLY: CPT

## 2024-12-31 RX ORDER — DOBUTAMINE HYDROCHLORIDE 200 MG/100ML
2.5 INJECTION INTRAVENOUS CONTINUOUS
Status: DISCONTINUED | OUTPATIENT
Start: 2024-12-31 | End: 2025-01-01 | Stop reason: HOSPADM

## 2024-12-31 RX ORDER — METOPROLOL TARTRATE 1 MG/ML
2.5 INJECTION, SOLUTION INTRAVENOUS ONCE
Status: COMPLETED | OUTPATIENT
Start: 2024-12-31 | End: 2024-12-31

## 2024-12-31 RX ADMIN — DOBUTAMINE HYDROCHLORIDE 2.5 MCG/KG/MIN: 200 INJECTION INTRAVENOUS at 08:51

## 2024-12-31 RX ADMIN — METOROPROLOL TARTRATE 2.5 MG: 5 INJECTION, SOLUTION INTRAVENOUS at 09:12

## 2025-01-02 ENCOUNTER — ANESTHESIA EVENT (OUTPATIENT)
Dept: NON INVASIVE DIAGNOSTICS | Facility: HOSPITAL | Age: 75
End: 2025-01-02
Payer: COMMERCIAL

## 2025-01-02 ENCOUNTER — HOSPITAL ENCOUNTER (OUTPATIENT)
Facility: HOSPITAL | Age: 75
Discharge: HOME | End: 2025-01-03
Attending: INTERNAL MEDICINE | Admitting: INTERNAL MEDICINE
Payer: COMMERCIAL

## 2025-01-02 ENCOUNTER — APPOINTMENT (OUTPATIENT)
Dept: NON INVASIVE DIAGNOSTICS | Facility: HOSPITAL | Age: 75
End: 2025-01-02
Attending: INTERNAL MEDICINE
Payer: COMMERCIAL

## 2025-01-02 ENCOUNTER — ANESTHESIA (OUTPATIENT)
Dept: NON INVASIVE DIAGNOSTICS | Facility: HOSPITAL | Age: 75
End: 2025-01-02
Payer: COMMERCIAL

## 2025-01-02 DIAGNOSIS — I35.0 NONRHEUMATIC AORTIC VALVE STENOSIS: ICD-10-CM

## 2025-01-02 DIAGNOSIS — I25.5 ISCHEMIC CARDIOMYOPATHY: ICD-10-CM

## 2025-01-02 DIAGNOSIS — I48.0 PAROXYSMAL A-FIB (HCC): ICD-10-CM

## 2025-01-02 LAB
ANION GAP SERPL CALCULATED.3IONS-SCNC: 6 MMOL/L (ref 4–13)
AORTIC VALVE MEAN VELOCITY: 17.3 M/S
AV AREA BY CONTINUOUS VTI: 1 CM2
AV AREA PEAK VELOCITY: 1.1 CM2
AV LVOT MEAN GRADIENT: 2 MMHG
AV LVOT PEAK GRADIENT: 3 MMHG
AV MEAN GRADIENT: 13 MMHG
AV PEAK GRADIENT: 22 MMHG
AV VALVE AREA: 0.98 CM2
AV VELOCITY RATIO: 0.35
BUN SERPL-MCNC: 29 MG/DL (ref 5–25)
CALCIUM SERPL-MCNC: 9.4 MG/DL (ref 8.4–10.2)
CHLORIDE SERPL-SCNC: 104 MMOL/L (ref 96–108)
CO2 SERPL-SCNC: 28 MMOL/L (ref 21–32)
CREAT SERPL-MCNC: 1.97 MG/DL (ref 0.6–1.3)
DOP CALC AO PEAK VEL: 2.3 M/S
DOP CALC AO VTI: 54 CM
DOP CALC LVOT AREA: 3.14 CM2
DOP CALC LVOT CARDIAC INDEX: 2.12 L/MIN/M2
DOP CALC LVOT CARDIAC OUTPUT: 4.76 L/MIN
DOP CALC LVOT DIAMETER: 2 CM
DOP CALC LVOT PEAK VEL VTI: 16.8 CM
DOP CALC LVOT PEAK VEL: 0.8 M/S
DOP CALC LVOT STROKE INDEX: 24 ML/M2
DOP CALC LVOT STROKE VOLUME: 52.75 CM3
ERYTHROCYTE [DISTWIDTH] IN BLOOD BY AUTOMATED COUNT: 14.6 % (ref 11.6–15.1)
GFR SERPL CREATININE-BSD FRML MDRD: 32 ML/MIN/1.73SQ M
GLUCOSE P FAST SERPL-MCNC: 109 MG/DL (ref 65–99)
GLUCOSE SERPL-MCNC: 109 MG/DL (ref 65–140)
HCT VFR BLD AUTO: 37.8 % (ref 36.5–49.3)
HGB BLD-MCNC: 11.8 G/DL (ref 12–17)
INR PPP: 1.46 (ref 0.85–1.19)
KCT BLD-ACNC: 325 SEC (ref 89–137)
KCT BLD-ACNC: 331 SEC (ref 89–137)
KCT BLD-ACNC: 344 SEC (ref 89–137)
KCT BLD-ACNC: 357 SEC (ref 89–137)
KCT BLD-ACNC: 357 SEC (ref 89–137)
MAX HR PERCENT: 100 %
MAX HR: 148 BPM
MCH RBC QN AUTO: 29.8 PG (ref 26.8–34.3)
MCHC RBC AUTO-ENTMCNC: 31.2 G/DL (ref 31.4–37.4)
MCV RBC AUTO: 96 FL (ref 82–98)
PLATELET # BLD AUTO: 190 THOUSANDS/UL (ref 149–390)
PMV BLD AUTO: 9.4 FL (ref 8.9–12.7)
POTASSIUM SERPL-SCNC: 4.6 MMOL/L (ref 3.5–5.3)
PROTHROMBIN TIME: 17.9 SECONDS (ref 12.3–15)
RATE PRESSURE PRODUCT: ABNORMAL
RBC # BLD AUTO: 3.96 MILLION/UL (ref 3.88–5.62)
SL CV LV EF: 35
SL CV STRESS RECOVERY BP: ABNORMAL MMHG
SL CV STRESS RECOVERY HR: 97 BPM
SL CV STRESS RECOVERY O2 SAT: 98 %
SODIUM SERPL-SCNC: 138 MMOL/L (ref 135–147)
SPECIMEN SOURCE: ABNORMAL
STRESS ANGINA INDEX: 0
STRESS BASELINE BP: ABNORMAL MMHG
STRESS BASELINE HR: 79 BPM
STRESS O2 SAT REST: 98 %
STRESS PEAK HR: 148 BPM
STRESS POST EXERCISE DUR MIN: 19 MIN
STRESS POST EXERCISE DUR SEC: 42 SEC
STRESS POST O2 SAT PEAK: 94 %
STRESS POST PEAK BP: 136 MMHG
WBC # BLD AUTO: 4.82 THOUSAND/UL (ref 4.31–10.16)

## 2025-01-02 PROCEDURE — C1733 CATH, EP, OTHR THAN COOL-TIP: HCPCS | Performed by: INTERNAL MEDICINE

## 2025-01-02 PROCEDURE — C1766 INTRO/SHEATH,STRBLE,NON-PEEL: HCPCS | Performed by: INTERNAL MEDICINE

## 2025-01-02 PROCEDURE — 80048 BASIC METABOLIC PNL TOTAL CA: CPT | Performed by: PHYSICIAN ASSISTANT

## 2025-01-02 PROCEDURE — C1894 INTRO/SHEATH, NON-LASER: HCPCS | Performed by: INTERNAL MEDICINE

## 2025-01-02 PROCEDURE — C1732 CATH, EP, DIAG/ABL, 3D/VECT: HCPCS | Performed by: INTERNAL MEDICINE

## 2025-01-02 PROCEDURE — 93656 COMPRE EP EVAL ABLTJ ATR FIB: CPT | Performed by: INTERNAL MEDICINE

## 2025-01-02 PROCEDURE — C1759 CATH, INTRA ECHOCARDIOGRAPHY: HCPCS | Performed by: INTERNAL MEDICINE

## 2025-01-02 PROCEDURE — 85347 COAGULATION TIME ACTIVATED: CPT

## 2025-01-02 PROCEDURE — 93005 ELECTROCARDIOGRAM TRACING: CPT

## 2025-01-02 PROCEDURE — C1769 GUIDE WIRE: HCPCS | Performed by: INTERNAL MEDICINE

## 2025-01-02 PROCEDURE — 76937 US GUIDE VASCULAR ACCESS: CPT | Performed by: INTERNAL MEDICINE

## 2025-01-02 PROCEDURE — 93312 ECHO TRANSESOPHAGEAL: CPT

## 2025-01-02 PROCEDURE — 93655 ICAR CATH ABLTJ DSCRT ARRHYT: CPT | Performed by: INTERNAL MEDICINE

## 2025-01-02 PROCEDURE — NC001 PR NO CHARGE: Performed by: PHYSICIAN ASSISTANT

## 2025-01-02 PROCEDURE — 92960 CARDIOVERSION ELECTRIC EXT: CPT | Performed by: INTERNAL MEDICINE

## 2025-01-02 PROCEDURE — C1760 CLOSURE DEV, VASC: HCPCS | Performed by: INTERNAL MEDICINE

## 2025-01-02 PROCEDURE — C1730 CATH, EP, 19 OR FEW ELECT: HCPCS | Performed by: INTERNAL MEDICINE

## 2025-01-02 PROCEDURE — 93657 TX L/R ATRIAL FIB ADDL: CPT | Performed by: INTERNAL MEDICINE

## 2025-01-02 PROCEDURE — 85610 PROTHROMBIN TIME: CPT | Performed by: PHYSICIAN ASSISTANT

## 2025-01-02 PROCEDURE — 85027 COMPLETE CBC AUTOMATED: CPT | Performed by: PHYSICIAN ASSISTANT

## 2025-01-02 DEVICE — DVC VASC CLSR VASCADE MVP 6-12FR: Type: IMPLANTABLE DEVICE | Site: GROIN | Status: FUNCTIONAL

## 2025-01-02 DEVICE — DVC VASC CLSR VASCADE MVP XL 10-12FR: Type: IMPLANTABLE DEVICE | Site: GROIN | Status: FUNCTIONAL

## 2025-01-02 RX ORDER — DULOXETIN HYDROCHLORIDE 60 MG/1
60 CAPSULE, DELAYED RELEASE ORAL DAILY
Status: DISCONTINUED | OUTPATIENT
Start: 2025-01-03 | End: 2025-01-03 | Stop reason: HOSPADM

## 2025-01-02 RX ORDER — HEPARIN SODIUM 10000 [USP'U]/100ML
INJECTION, SOLUTION INTRAVENOUS
Status: DISCONTINUED | OUTPATIENT
Start: 2025-01-02 | End: 2025-01-02 | Stop reason: HOSPADM

## 2025-01-02 RX ORDER — TAMSULOSIN HYDROCHLORIDE 0.4 MG/1
0.4 CAPSULE ORAL
Status: DISCONTINUED | OUTPATIENT
Start: 2025-01-02 | End: 2025-01-03 | Stop reason: HOSPADM

## 2025-01-02 RX ORDER — WARFARIN SODIUM 2.5 MG/1
2.5 TABLET ORAL
Status: DISCONTINUED | OUTPATIENT
Start: 2025-01-02 | End: 2025-01-02

## 2025-01-02 RX ORDER — CEFAZOLIN SODIUM 2 G/50ML
SOLUTION INTRAVENOUS AS NEEDED
Status: DISCONTINUED | OUTPATIENT
Start: 2025-01-02 | End: 2025-01-02

## 2025-01-02 RX ORDER — FENTANYL CITRATE 50 UG/ML
INJECTION, SOLUTION INTRAMUSCULAR; INTRAVENOUS AS NEEDED
Status: DISCONTINUED | OUTPATIENT
Start: 2025-01-02 | End: 2025-01-02

## 2025-01-02 RX ORDER — GABAPENTIN 300 MG/1
300 CAPSULE ORAL
Status: DISCONTINUED | OUTPATIENT
Start: 2025-01-02 | End: 2025-01-03 | Stop reason: HOSPADM

## 2025-01-02 RX ORDER — ACETAMINOPHEN 325 MG/1
975 TABLET ORAL EVERY 6 HOURS PRN
Status: DISCONTINUED | OUTPATIENT
Start: 2025-01-02 | End: 2025-01-03 | Stop reason: HOSPADM

## 2025-01-02 RX ORDER — ASPIRIN 81 MG/1
81 TABLET, CHEWABLE ORAL DAILY
Status: DISCONTINUED | OUTPATIENT
Start: 2025-01-03 | End: 2025-01-03 | Stop reason: HOSPADM

## 2025-01-02 RX ORDER — AMIODARONE HYDROCHLORIDE 200 MG/1
200 TABLET ORAL DAILY
Status: DISCONTINUED | OUTPATIENT
Start: 2025-01-03 | End: 2025-01-03 | Stop reason: HOSPADM

## 2025-01-02 RX ORDER — LISINOPRIL 5 MG/1
5 TABLET ORAL DAILY
Status: DISCONTINUED | OUTPATIENT
Start: 2025-01-03 | End: 2025-01-03 | Stop reason: HOSPADM

## 2025-01-02 RX ORDER — ONDANSETRON 2 MG/ML
INJECTION INTRAMUSCULAR; INTRAVENOUS AS NEEDED
Status: DISCONTINUED | OUTPATIENT
Start: 2025-01-02 | End: 2025-01-02

## 2025-01-02 RX ORDER — METOPROLOL SUCCINATE 25 MG/1
25 TABLET, EXTENDED RELEASE ORAL 2 TIMES DAILY
Status: DISCONTINUED | OUTPATIENT
Start: 2025-01-02 | End: 2025-01-03 | Stop reason: HOSPADM

## 2025-01-02 RX ORDER — PROTAMINE SULFATE 10 MG/ML
INJECTION, SOLUTION INTRAVENOUS AS NEEDED
Status: DISCONTINUED | OUTPATIENT
Start: 2025-01-02 | End: 2025-01-02

## 2025-01-02 RX ORDER — ROCURONIUM BROMIDE 10 MG/ML
INJECTION, SOLUTION INTRAVENOUS AS NEEDED
Status: DISCONTINUED | OUTPATIENT
Start: 2025-01-02 | End: 2025-01-02

## 2025-01-02 RX ORDER — EPHEDRINE SULFATE 50 MG/ML
INJECTION INTRAVENOUS AS NEEDED
Status: DISCONTINUED | OUTPATIENT
Start: 2025-01-02 | End: 2025-01-02

## 2025-01-02 RX ORDER — METHOCARBAMOL 500 MG/1
500 TABLET, FILM COATED ORAL 3 TIMES DAILY
Status: DISCONTINUED | OUTPATIENT
Start: 2025-01-02 | End: 2025-01-03 | Stop reason: HOSPADM

## 2025-01-02 RX ORDER — PANTOPRAZOLE SODIUM 40 MG/10ML
40 INJECTION, POWDER, LYOPHILIZED, FOR SOLUTION INTRAVENOUS ONCE
Status: DISCONTINUED | OUTPATIENT
Start: 2025-01-02 | End: 2025-01-02

## 2025-01-02 RX ORDER — GLYCOPYRROLATE 0.2 MG/ML
INJECTION INTRAMUSCULAR; INTRAVENOUS AS NEEDED
Status: DISCONTINUED | OUTPATIENT
Start: 2025-01-02 | End: 2025-01-02

## 2025-01-02 RX ORDER — HYDROMORPHONE HCL IN WATER/PF 6 MG/30 ML
0.2 PATIENT CONTROLLED ANALGESIA SYRINGE INTRAVENOUS
Status: DISCONTINUED | OUTPATIENT
Start: 2025-01-02 | End: 2025-01-02 | Stop reason: HOSPADM

## 2025-01-02 RX ORDER — EZETIMIBE 10 MG/1
10 TABLET ORAL DAILY
Status: DISCONTINUED | OUTPATIENT
Start: 2025-01-03 | End: 2025-01-03 | Stop reason: HOSPADM

## 2025-01-02 RX ORDER — SODIUM CHLORIDE 9 MG/ML
75 INJECTION, SOLUTION INTRAVENOUS CONTINUOUS
Status: DISCONTINUED | OUTPATIENT
Start: 2025-01-02 | End: 2025-01-03 | Stop reason: HOSPADM

## 2025-01-02 RX ORDER — ALBUTEROL SULFATE 0.83 MG/ML
2.5 SOLUTION RESPIRATORY (INHALATION) ONCE AS NEEDED
Status: DISCONTINUED | OUTPATIENT
Start: 2025-01-02 | End: 2025-01-02 | Stop reason: HOSPADM

## 2025-01-02 RX ORDER — WARFARIN SODIUM 5 MG/1
5 TABLET ORAL
Status: DISCONTINUED | OUTPATIENT
Start: 2025-01-02 | End: 2025-01-03 | Stop reason: HOSPADM

## 2025-01-02 RX ORDER — HEPARIN SODIUM 1000 [USP'U]/ML
INJECTION, SOLUTION INTRAVENOUS; SUBCUTANEOUS CODE/TRAUMA/SEDATION MEDICATION
Status: DISCONTINUED | OUTPATIENT
Start: 2025-01-02 | End: 2025-01-02 | Stop reason: HOSPADM

## 2025-01-02 RX ORDER — TORSEMIDE 20 MG/1
20 TABLET ORAL DAILY
Status: DISCONTINUED | OUTPATIENT
Start: 2025-01-03 | End: 2025-01-03 | Stop reason: HOSPADM

## 2025-01-02 RX ORDER — LIDOCAINE HYDROCHLORIDE 10 MG/ML
INJECTION, SOLUTION EPIDURAL; INFILTRATION; INTRACAUDAL; PERINEURAL AS NEEDED
Status: DISCONTINUED | OUTPATIENT
Start: 2025-01-02 | End: 2025-01-02

## 2025-01-02 RX ORDER — ONDANSETRON 2 MG/ML
4 INJECTION INTRAMUSCULAR; INTRAVENOUS ONCE AS NEEDED
Status: DISCONTINUED | OUTPATIENT
Start: 2025-01-02 | End: 2025-01-02 | Stop reason: HOSPADM

## 2025-01-02 RX ORDER — FENTANYL CITRATE/PF 50 MCG/ML
25 SYRINGE (ML) INJECTION
Status: DISCONTINUED | OUTPATIENT
Start: 2025-01-02 | End: 2025-01-02 | Stop reason: HOSPADM

## 2025-01-02 RX ORDER — ZOLPIDEM TARTRATE 5 MG/1
10 TABLET ORAL
Status: DISCONTINUED | OUTPATIENT
Start: 2025-01-02 | End: 2025-01-03 | Stop reason: HOSPADM

## 2025-01-02 RX ORDER — SODIUM CHLORIDE 9 MG/ML
20 INJECTION, SOLUTION INTRAVENOUS ONCE
Status: DISCONTINUED | OUTPATIENT
Start: 2025-01-02 | End: 2025-01-02

## 2025-01-02 RX ORDER — PROPOFOL 10 MG/ML
INJECTION, EMULSION INTRAVENOUS AS NEEDED
Status: DISCONTINUED | OUTPATIENT
Start: 2025-01-02 | End: 2025-01-02

## 2025-01-02 RX ORDER — ATORVASTATIN CALCIUM 80 MG/1
80 TABLET, FILM COATED ORAL EVERY EVENING
Status: DISCONTINUED | OUTPATIENT
Start: 2025-01-03 | End: 2025-01-03 | Stop reason: HOSPADM

## 2025-01-02 RX ORDER — POTASSIUM CHLORIDE 1500 MG/1
20 TABLET, EXTENDED RELEASE ORAL DAILY
Status: DISCONTINUED | OUTPATIENT
Start: 2025-01-03 | End: 2025-01-03 | Stop reason: HOSPADM

## 2025-01-02 RX ORDER — SODIUM CHLORIDE 9 MG/ML
20 INJECTION, SOLUTION INTRAVENOUS CONTINUOUS
Status: DISCONTINUED | OUTPATIENT
Start: 2025-01-02 | End: 2025-01-02

## 2025-01-02 RX ADMIN — METHOCARBAMOL 500 MG: 500 TABLET ORAL at 22:02

## 2025-01-02 RX ADMIN — PROTAMINE SULFATE 60 MG: 10 INJECTION, SOLUTION INTRAVENOUS at 13:43

## 2025-01-02 RX ADMIN — APIXABAN 5 MG: 5 TABLET, FILM COATED ORAL at 09:51

## 2025-01-02 RX ADMIN — PROPOFOL 40 MG: 10 INJECTION, EMULSION INTRAVENOUS at 10:45

## 2025-01-02 RX ADMIN — ROCURONIUM BROMIDE 50 MG: 10 INJECTION, SOLUTION INTRAVENOUS at 10:45

## 2025-01-02 RX ADMIN — WARFARIN SODIUM 5 MG: 5 TABLET ORAL at 17:14

## 2025-01-02 RX ADMIN — FENTANYL CITRATE 50 MCG: 50 INJECTION INTRAMUSCULAR; INTRAVENOUS at 10:45

## 2025-01-02 RX ADMIN — GABAPENTIN 300 MG: 300 CAPSULE ORAL at 22:02

## 2025-01-02 RX ADMIN — ONDANSETRON 4 MG: 2 INJECTION INTRAMUSCULAR; INTRAVENOUS at 13:40

## 2025-01-02 RX ADMIN — ROCURONIUM BROMIDE 20 MG: 10 INJECTION, SOLUTION INTRAVENOUS at 12:19

## 2025-01-02 RX ADMIN — SUGAMMADEX 200 MG: 100 INJECTION, SOLUTION INTRAVENOUS at 13:44

## 2025-01-02 RX ADMIN — Medication 10 MCG: at 10:45

## 2025-01-02 RX ADMIN — SODIUM CHLORIDE 20 ML/HR: 0.9 INJECTION, SOLUTION INTRAVENOUS at 09:05

## 2025-01-02 RX ADMIN — Medication 10 MCG: at 13:54

## 2025-01-02 RX ADMIN — METHOCARBAMOL 500 MG: 500 TABLET ORAL at 17:14

## 2025-01-02 RX ADMIN — LIDOCAINE HYDROCHLORIDE 50 MG: 10 INJECTION, SOLUTION EPIDURAL; INFILTRATION; INTRACAUDAL; PERINEURAL at 10:44

## 2025-01-02 RX ADMIN — PHENYLEPHRINE HYDROCHLORIDE 30 MCG/MIN: 10 INJECTION INTRAVENOUS at 10:57

## 2025-01-02 RX ADMIN — EPHEDRINE SULFATE 10 MG: 50 INJECTION, SOLUTION INTRAVENOUS at 13:58

## 2025-01-02 RX ADMIN — GLYCOPYRROLATE 0.2 MG: 0.2 INJECTION, SOLUTION INTRAMUSCULAR; INTRAVENOUS at 11:41

## 2025-01-02 RX ADMIN — METOPROLOL SUCCINATE 25 MG: 25 TABLET, FILM COATED, EXTENDED RELEASE ORAL at 22:02

## 2025-01-02 RX ADMIN — PHENYLEPHRINE HYDROCHLORIDE 50 MCG/MIN: 10 INJECTION INTRAVENOUS at 11:05

## 2025-01-02 RX ADMIN — APIXABAN 5 MG: 5 TABLET, FILM COATED ORAL at 17:14

## 2025-01-02 RX ADMIN — CEFAZOLIN SODIUM 2000 MG: 2 SOLUTION INTRAVENOUS at 10:55

## 2025-01-02 RX ADMIN — ACETAMINOPHEN 975 MG: 325 TABLET, FILM COATED ORAL at 22:13

## 2025-01-02 RX ADMIN — FENTANYL CITRATE 50 MCG: 50 INJECTION INTRAMUSCULAR; INTRAVENOUS at 12:19

## 2025-01-02 RX ADMIN — TAMSULOSIN HYDROCHLORIDE 0.4 MG: 0.4 CAPSULE ORAL at 17:14

## 2025-01-02 NOTE — ANESTHESIA POSTPROCEDURE EVALUATION
Post-Op Assessment Note    CV Status:  Stable    Pain management: adequate       Mental Status:  Alert and awake   Hydration Status:  Euvolemic   PONV Controlled:  Controlled   Airway Patency:  Patent     Post Op Vitals Reviewed: Yes    No anethesia notable event occurred.    Staff: CRNA           Last Filed PACU Vitals:  Vitals Value Taken Time   Temp 97.2    Pulse 52 01/02/25 1408   /55 01/02/25 1406   Resp 16    SpO2 99 % 01/02/25 1408   Vitals shown include unfiled device data.

## 2025-01-02 NOTE — ANESTHESIA PREPROCEDURE EVALUATION
Procedure:  Cardiac eps/afib ablation PFA (Chest)    Relevant Problems   CARDIO   (+) Atherosclerosis of aorta (HCC)   (+) Atherosclerosis of native coronary artery of native heart without angina pectoris   (+) Mixed hyperlipidemia   (+) Paroxysmal A-fib/flutter (HCC)   (+) Peripheral vascular disease, unspecified (HCC)   (+) Primary hypertension   (+) Severe aortic stenosis   (+) Sinus bradycardia      /RENAL   (+) Glomerulonephritis   (+) 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   (+) Stage 3b chronic kidney disease (CKD) (HCC)      HEMATOLOGY   (+) Anemia   (+) Anemia due to stage 4 chronic kidney disease  (HCC)   (+) 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HCC)      MUSCULOSKELETAL   (+) Other spondylosis with myelopathy, cervical region   (+) Primary osteoarthritis of left knee   (+) Primary osteoarthritis of right knee   (+) Primary osteoarthritis of right shoulder      NEURO/PSYCH   (+) Head pain cephalgia   (+) Reactive depression      PULMONARY   (+) WILL (obstructive sleep apnea)        Physical Exam    Airway    Mallampati score: II  TM Distance: >3 FB  Neck ROM: full     Dental       Cardiovascular      Pulmonary      Other Findings        Anesthesia Plan  ASA Score- 4     Anesthesia Type- general with ASA Monitors.         Additional Monitors: arterial line.    Airway Plan: ETT.           Plan Factors-Exercise tolerance (METS): >4 METS.    Chart reviewed.        Patient is not a current smoker.  Patient did not smoke on day of surgery.    Obstructive sleep apnea risk education given perioperatively.        Induction- intravenous.    Postoperative Plan- Plan for postoperative opioid use. Planned trial extubation    Perioperative Resuscitation Plan - Level 1 - Full Code.       Informed Consent- Anesthetic plan and risks discussed with patient.  I personally reviewed this patient with the CRNA. Discussed and agreed on the Anesthesia Plan with the CRNA..    Anesthesia plan and consent  discussed with Tian who expressed understanding and agreement. Risks/benefits and alternatives discussed with patient including possible PONV, sore throat, damage to teeth/lips/gums and possibility of rare anesthetic and surgical emergencies.

## 2025-01-02 NOTE — ASSESSMENT & PLAN NOTE
- anticoagulated with Warfarin / [id number] score of 5 (age, DVT, CAD, CHF)  - EF of 33% per echo 11/2024 / moderate dilation of left atrium noted  - rate control: metoprolol succinate  - antiarrhythmic therapy: amiodarone  - prior cardioversion:  - prior ablation: CTI line 1/2015  - now status post ablation of atrial fibrillation with pulmonary vein isolation and posterior wall along with mitral flutter (anterior roof line) with PFA by Dr. Amin 1/2/2024

## 2025-01-02 NOTE — ASSESSMENT & PLAN NOTE
Lab Results   Component Value Date    EGFR 43 (L) 12/27/2024    EGFR 32 09/24/2024    EGFR 34 (L) 09/04/2024    CREATININE 1.68 (H) 12/27/2024    CREATININE 1.98 (H) 09/24/2024    CREATININE 2.02 (H) 09/04/2024

## 2025-01-02 NOTE — NURSING NOTE
Patient admitted to p4 for observation, vitals stable, groin sites normal with no hematoma. Telemetry on. Call bell within reach.

## 2025-01-02 NOTE — ASSESSMENT & PLAN NOTE
Lab Results   Component Value Date    EGFR 32 01/02/2025    EGFR 43 (L) 12/27/2024    EGFR 32 09/24/2024    CREATININE 1.97 (H) 01/02/2025    CREATININE 1.68 (H) 12/27/2024    CREATININE 1.98 (H) 09/24/2024

## 2025-01-02 NOTE — DISCHARGE SUMMARY
Discharge Summary - Electrophysiology   Name: Tian Garcia 74 y.o. male I MRN: [id number]  Unit/Bed#: [facility] 412-01 I Date of Admission: 1/2/2025   Date of Service: 1/2/2025 I Hospital Day: 0  { ?Quick Links I Problem List I PORCH I Billing Tip:[id number]}    Discharge Summary - Tian Garcia 74 y.o. male MRN: [id number]    Unit/Bed#: [facility] 412-01 Encounter: [id number]      Admission Date: 1/2/2025   Discharge Date: 1/3/2025***    Discharge Diagnosis:   Assessment & Plan  Paroxysmal A-fib/flutter (HCC)  - anticoagulated with Warfarin / [id number] score of 5 (age, DVT, CAD, CHF)  - EF of 33% per echo 11/2024 / moderate dilation of left atrium noted  - rate control: metoprolol succinate  - antiarrhythmic therapy: amiodarone  - prior cardioversion:  - prior ablation: CTI line 1/2015  - now status post ablation of atrial fibrillation with pulmonary vein isolation and posterior wall along with mitral flutter (anterior roof line) with PFA by Dr. Amin 1/2/2024  Status post catheter ablation of atrial flutter  - underwent CTI dependent flutter by Dr. Nolan on 1/21/2015  Severe aortic stenosis  - being worked up by CTS - dobutamine stress echo recently  Stage 3b chronic kidney disease (CKD) ([facility])  Lab Results   Component Value Date    EGFR 32 01/02/2025    EGFR 43 (L) 12/27/2024    EGFR 32 09/24/2024    CREATININE 1.97 (H) 01/02/2025    CREATININE 1.68 (H) 12/27/2024    CREATININE 1.98 (H) 09/24/2024     Glomerulonephritis    History of ST elevation myocardial infarction (STEMI)  - prior PCI with non-overlapping ROBERT x2 of sequential proximal and mid LAD, residual mid RCA 80% lesion noted  - on aspirin, metoprolol and atorvastatin  Ischemic cardiomyopathy    History of DVT of lower extremity  - with Factor 5 Leiden mutation   S/P cervical spinal fusion      Procedures Performed:   ***  Orders Placed This Encounter   Procedures    Cardiac ep lab eps/ablations       Consultants: None***    HPI: Please refer to the initial  history and physical as well as procedure notes for full details. Briefly, Tian Garcia is a 74 y.o. year old male with past medical history as stated above.     Patient was evaluated in the EP office on 12/4/2024 for further atrial fibrillation management.  Patient is noted to have a history of paroxysmal atrial fibrillation dating back at least a decade.  Had atrial arrhythmias postop from the surgery and underwent CTI line by Dr. Nolan 1/21/2015.  Although it is unclear from notes, patient reports that he almost needed repeat cardioversion afterwards, so likely went into atrial fibrillation.  Patient required a second knee operation more recently and has had increased burden of atrial fibrillation.  When seen in the office it was recommended that he undergo ablation. He presented this hospital admission to undergo this procedure.     Hospital Course: Tian Garcia presented at his baseline state of health. After the procedure was explained in detail and consent was obtained, he underwent ablation of atrial fibrillation and flutter without complications. He tolerated the procedure well. He was then monitored overnight for further observation.    There were no acute issues or events overnight. The following morning He denied all cardiac complaints, including chest pain/pressure, dyspnea, palpitations, dizziness, lightheadedness, or syncope. His vital signs were reviewed and labs are stable. Telemetry showed ***. His groins were soft without significant hematoma or recurrent bleeding. Physical exam on the day of discharge was as follows:  ***  GEN: NAD, alert and oriented, well appearing  SKIN: dry without significant lesions or rashes  HEENT: NCAT, PERRL, EOMs intact  NECK: No JVD or carotid bruits appreciated  CARDIOVASCULAR: RRR, normal S1, S2 without murmurs, rubs, or gallops appreciated  LUNGS: Clear to auscultation bilaterally without wheezes, rhonchi, or rales  ABDOMEN: Soft, nontender,  nondistended  EXTREMITIES/VASCULAR: perfused without clubbing, cyanosis, or edema b/l  PSYCH: Normal mood and affect  NEURO: CN ll-Xll grossly intact    He was given routine post ablation discharge instructions and restrictions, and these were explained in detail. He was given a follow up appointment with Noe Childers PA-C, and he was instructed to follow up with his primary cardiologist as previously instructed.    In terms of his medications, he will be bridged with Eliquis as INR was subtherapeutic. He will continue with amiodarone for now.     He is stable for discharge at this time with all questions answered. He was discussed in detail with Dr. Amin who is in agreement with this discharge summary.     Discharge Medications:  See after visit summary for reconciled discharge medications provided to patient and family.      Medications Prior to Admission:     amiodarone 200 mg tablet    Aspirin 81 MG CAPS    atorvastatin (LIPITOR) 80 mg tablet    DULoxetine (CYMBALTA) 60 mg delayed release capsule    ezetimibe (ZETIA) 10 mg tablet    gabapentin (NEURONTIN) 300 mg capsule    metoprolol succinate (TOPROL-XL) 25 mg 24 hr tablet    tamsulosin (FLOMAX) 0.4 mg    warfarin (COUMADIN) 5 mg tablet    amiodarone 200 mg tablet    glucose blood (Accu-Chek Guide) test strip    lisinopril (ZESTRIL) 5 mg tablet    methocarbamol (ROBAXIN) 500 mg tablet    mometasone (ELOCON) 0.1 % cream    nitroglycerin (NITROSTAT) 0.4 mg SL tablet    potassium chloride (Klor-Con M20) 20 mEq tablet    torsemide (DEMADEX) 20 mg tablet    zolpidem (AMBIEN) 10 mg tablet      Pertininet Labs/diagnostics:  CBC with diff:   Results from last 7 days   Lab Units 01/02/25  0901 12/27/24  1242   WBC Thousand/uL 4.82  --    WHITE BLOOD CELL COUNT. x10E3/uL  --  6.0   HEMOGLOBIN g/dL 11.8*  --    HEMOGLOBIN. g/dL  --  12.7*   HEMATOCRIT % 37.8  --    HEMATOCRIT. %  --  39.5   MCV fL 96  --    MCV. fL  --  93   PLATELETS Thousands/uL 190  --     PLATELETS. x10E3/uL  --  219   RBC Million/uL 3.96  --    RED BLOOD CELL COUNT. x10E6/uL  --  4.25   MCH pg 29.8  --    MCH. pg  --  29.9   MCHC g/dL 31.2*  --    MCHC. g/dL  --  32.2   RDW % 14.6  --    RDW. %  --  13.3   MPV fL 9.4  --        BMP:  Results from last 7 days   Lab Units 01/02/25  0901 12/27/24  1242   POTASSIUM mmol/L 4.6 4.2   CHLORIDE mmol/L 104 102   CO2 mmol/L 28 23   BUN mg/dL 29* 35*   CREATININE mg/dL 1.97* 1.68*   CALCIUM mg/dL 9.4  --        Magnesium:       Coags:   Results from last 7 days   Lab Units 01/02/25  0901 12/27/24  1242   INR  1.46* 3.8*         Complications: none    Condition at Discharge: good     Discharge instructions/Information to patient and family:   See after visit summary for information provided to patient and family.      Provisions for Follow-Up Care:  See after visit summary for information related to follow-up care and any pertinent home health orders.      Disposition: Home    Planned Readmission: No    Discharge Statement   I spent 45 minutes minutes discharging the patient. This time was spent on the day of discharge. I had direct contact with the patient on the day of discharge. Additional documentation is required if more than 30 minutes were spent on discharge. Evaluating the incision, discussing discharge instructions and restrictions, arranging follow up appointments, discussing medications

## 2025-01-02 NOTE — H&P
H&P Exam - Electrophysiology  Tian Garcia 74 y.o. male MRN: [id number]  Unit/Bed#: [facility] CATH LAB ROOM Encounter: [id number]    Assessment & Plan     Assessment & Plan  Paroxysmal A-fib/flutter (HCC)  - anticoagulated with Warfarin / [id number] score of 5 (age, DVT, CAD, CHF)  - EF of 33% per echo 11/2024 / moderate dilation of left atrium noted  - rate control: metoprolol succinate  - antiarrhythmic therapy: amiodarone  - prior cardioversion:  - prior ablation: CTI line 1/2015    Patient presents today to undergo ablation of atrial fibrillation.  Status post catheter ablation of atrial flutter  - underwent CTI dependent flutter by Dr. Nolan on 1/21/2015  Severe aortic stenosis  - being worked up by CTS - dobutamine stress echo recently  Stage 3b chronic kidney disease (CKD) ([facility])  Lab Results   Component Value Date    EGFR 43 (L) 12/27/2024    EGFR 32 09/24/2024    EGFR 34 (L) 09/04/2024    CREATININE 1.68 (H) 12/27/2024    CREATININE 1.98 (H) 09/24/2024    CREATININE 2.02 (H) 09/04/2024     Glomerulonephritis    History of ST elevation myocardial infarction (STEMI)  - prior PCI with non-overlapping ROBERT x2 of sequential proximal and mid LAD, residual mid RCA 80% lesion noted  - on aspirin, metoprolol and atorvastatin  Ischemic cardiomyopathy    History of DVT of lower extremity  - with Factor 5 Leiden mutation   S/P cervical spinal fusion        History of Present Illness   HPI:  Tian Garcia is a 74 y.o. year old male with past medical history as stated above.    Patient was evaluated in the EP office on 12/4/2024 for further atrial fibrillation management.  Patient is noted to have a history of paroxysmal atrial fibrillation dating back at least a decade.  Had atrial arrhythmias postop from the surgery and underwent CTI line by Dr. Nolan 1/21/2015.  Although it is unclear from notes, patient reports that he almost needed repeat cardioversion afterwards, so likely went into atrial fibrillation.  Patient  required a second knee operation more recently and has had increased burden of atrial fibrillation.  When seen in the office it was recommended that he undergo ablation.    Of note, patient also has a history of ischemic cardiomyopathy with prior PCI.  He is also being worked up by CT surgery for severe aortic stenosis, had dobutamine stress echo earlier this week.    EKG:     Review of Systems  ROS as noted above, otherwise 12 point review of systems was performed and is negative.     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, 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, on steroids for prolonged period    Ambulatory dysfunction     cane use    Anemia     BPH (benign prostatic hyperplasia)     CHF (congestive heart failure) (Formerly [facility])     Chronic kidney disease     stage 3b, baseline Cr 2.2    DDD (degenerative disc disease), cervical     DDD (degenerative disc disease), lumbar     Diverticulosis     Factor V Leiden (Formerly [facility])     History of DVT (deep vein thrombosis)     from Facotr V, on Coumadin    [place] (hard of hearing)     Hyperlipidemia     Hypertension     Insomnia     Ischemic cardiomyopathy     EF 33%    MRSA carrier     Neuropathy     Obesity     WILL on CPAP     Osteoarthritis     Other acute osteomyelitis, other site ([facility]ly [facility]) 01/03/2023    Paroxysmal atrial fibrillation ([facility]ly [facility])     s/p ablation, s/p DCCV, Coumadin, on Amiodarone for    RBBB     Rotator cuff tear     right    Severe aortic stenosis     Steroid-induced hyperglycemia     requiring insulin, w/ MRSA infection 2022, resolved     Past Surgical History:   Procedure Laterality Date    CARDIAC CATHETERIZATION N/A 04/09/2023    Procedure: Cardiac pci;  Surgeon: Bird Cast MD;  Location: [facility] CARDIAC CATH LAB;  Service: Cardiology    CARDIAC CATHETERIZATION N/A 04/09/2023    Procedure: Cardiac Coronary Angiogram;  Surgeon: Bird Cast MD;  Location: [facility] CARDIAC  CATH LAB;  Service: Cardiology    CATARACT EXTRACTION Right     COLON SURGERY      COLONOSCOPY      INCISION AND DRAINAGE POSTERIOR SPINE N/A 04/01/2022    Procedure: Posterior cervical evacuation of postoperative collection and debridement with placement of drains C3-T1;  Surgeon: Rajeev Garcia MD;  Location: [facility] MAIN OR;  Service: Neurosurgery    IR BIOPSY KIDNEY RANDOM  05/26/2022    IR TUNNELED DIALYSIS CATHETER PLACEMENT  05/23/2022    IR TUNNELED DIALYSIS CATHETER REMOVAL  06/02/2022    LUMBAR SPINE SURGERY      herniated disc    SC ARTHRD ANT INTERBODY MIN DSC CRV BELOW C2 N/A 03/11/2022    Procedure: Anterior cervical discectomy and fixation fusion C5/6 and C6/7; Posterior cervical decompression and instrumented fusion C3-T1;  Surgeon: Rajeev Garcia MD;  Location: [facility] MAIN OR;  Service: Neurosurgery    SC ARTHRP KNE CONDYLE&PLATU MEDIAL&LAT COMPARTMENTS Right 04/08/2024    Procedure: ARTHROPLASTY KNEE TOTAL and all associated procedures;  Surgeon: Dot Galindo MD;  Location: [facility] Main OR;  Service: Orthopedics    SC ARTHRP KNE CONDYLE&PLATU MEDIAL&LAT COMPARTMENTS Left 09/23/2024    Procedure: ARTHROPLASTY KNEE TOTAL;  Surgeon: Dot Galindo MD;  Location: [facility] Main OR;  Service: Orthopedics    RENAL BIOPSY  6/2022    SPINE SURGERY  03/11/2022    Cervical myelopathy/ cervical fusion     Family History:   Family History   Problem Relation Age of Onset    Lung cancer Mother     Hearing loss Father     Emphysema Sister     Heart attack Brother     Atrial fibrillation Brother     Heart failure Brother     Other (atrial fib) Brother     Clotting disorder Son        Social History   Social History     Substance and Sexual Activity   Alcohol Use Not Currently     Social History     Substance and Sexual Activity   Drug Use No     Social History     Tobacco Use   Smoking Status Never   Smokeless Tobacco Never       Meds/Allergies   all medications and allergies reviewed  Home Medications:  "  Medications Prior to Admission:     amiodarone 200 mg tablet    Aspirin 81 MG CAPS    atorvastatin (LIPITOR) 80 mg tablet    DULoxetine (CYMBALTA) 60 mg delayed release capsule    ezetimibe (ZETIA) 10 mg tablet    gabapentin (NEURONTIN) 300 mg capsule    metoprolol succinate (TOPROL-XL) 25 mg 24 hr tablet    tamsulosin (FLOMAX) 0.4 mg    warfarin (COUMADIN) 5 mg tablet    amiodarone 200 mg tablet    glucose blood (Accu-Chek Guide) test strip    lisinopril (ZESTRIL) 5 mg tablet    methocarbamol (ROBAXIN) 500 mg tablet    mometasone (ELOCON) 0.1 % cream    nitroglycerin (NITROSTAT) 0.4 mg SL tablet    potassium chloride (Klor-Con M20) 20 mEq tablet    torsemide (DEMADEX) 20 mg tablet    zolpidem (AMBIEN) 10 mg tablet    Allergies   Allergen Reactions    Morphine GI Intolerance    Vitamin K Other (See Comments)     On coumadin-patient sensitive to vitamin K amounts in meds etc.       Objective   Vitals: Blood pressure 131/93, pulse 70, temperature 97.7 °F (36.5 °C), temperature source Temporal, resp. rate 17, height 5' 11\" (1.803 m), weight 106 kg (233 lb), SpO2 95%.  Orthostatic Blood Pressures      Flowsheet Row Most Recent Value   Blood Pressure 131/93 filed at 01/02/2025 0902            No intake or output data in the 24 hours ending 01/02/25 0933    Invasive Devices       Peripheral Intravenous Line  Duration             Peripheral IV 01/02/25 Dorsal (posterior);Left Forearm <1 day    Peripheral IV 01/02/25 Right Antecubital <1 day                    Physical Exam   GEN: NAD, alert and oriented, well appearing  SKIN: dry without significant lesions or rashes  HEENT: NCAT, PERRL, EOMs intact  NECK: No JVD appreciated  CARDIOVASCULAR: regular  LUNGS: Good respiratory effort with no audible wheezes  PSYCH: Normal mood and affect  NEURO: CN ll-Xll grossly intact      Lab Results: I have personally reviewed pertinent lab results.    Results from last 7 days   Lab Units 01/02/25  0901 12/27/24  1242   WBC Thousand/uL " 4.82  --    WHITE BLOOD CELL COUNT. x10E3/uL  --  6.0   HEMOGLOBIN g/dL 11.8*  --    HEMOGLOBIN. g/dL  --  12.7*   HEMATOCRIT % 37.8  --    HEMATOCRIT. %  --  39.5   PLATELETS Thousands/uL 190  --    PLATELETS. x10E3/uL  --  219     Results from last 7 days   Lab Units 12/27/24  1242   POTASSIUM mmol/L 4.2   CHLORIDE mmol/L 102   CO2 mmol/L 23   BUN mg/dL 35*   CREATININE mg/dL 1.68*     Results from last 7 days   Lab Units 01/02/25  0901 12/27/24  1242   INR  1.46* 3.8*             Imaging: Results Review Statement: No pertinent imaging studies reviewed.  No results found for this or any previous visit.      Code Status: Level 1 - Full Code    ** Please Note: Dictation voice to text software may have been used in the creation of this document. **

## 2025-01-02 NOTE — DISCHARGE INSTR - AVS FIRST PAGE
MEDICATION INSTRUCTIONS/CHANGES:  Please continue both amiodarone and metoprolol.  Please take Eliquis 5mg twice daily until your INR is therapeutic (between 2 and 3). Please be sure to recheck your INR on Monday to see if the Eliquis can be discontinued - we will let your PCP Dr. Santillan's office know.     RESTRICTIONS:   No heavy lifting (more than 5-10 pounds) or strenuous activity for one week.    No soaking in a bath tub/hot tub/swimming pool for one week or until groin heals. You may shower - please let soap and water run over the groins, no scrubbing, and pat the area dry. Please place band-aid on groins daily for up to five days, but you may remove sooner if no issues are noted.     If you notice ongoing bleeding, swelling, or firm lumps in groin near ablation incision, please contact Dr. Amin's office - [phone]. If you have any significant issues after hours or on the weekends, please call the on call cardiology number at [phone].      We would also like to monitor your heart rhythm at home with a heart monitor called a Zio. Please call the office if you have not heard anything about the monitor by midweek next week (as it does need to be run through your insurance), please call [phone] option clinical.

## 2025-01-02 NOTE — ANESTHESIA POSTPROCEDURE EVALUATION
Post-Op Assessment Note    CV Status:  Stable    Pain management: adequate       Mental Status:  Alert and awake   Hydration Status:  Euvolemic   PONV Controlled:  Controlled   Airway Patency:  Patent     Post Op Vitals Reviewed: Yes    No anethesia notable event occurred.    Staff: CRNA        Post-Op Assessment Note    Last Filed PACU Vitals:  Vitals Value Taken Time   Temp 97.8 °F (36.6 °C) 01/02/25 1500   Pulse 50 01/02/25 1514   /62 01/02/25 1500   Resp 14 01/02/25 1514   SpO2 96 % 01/02/25 1514   Vitals shown include unfiled device data.    Modified Krishna:     Vitals Value Taken Time   Activity 2 01/02/25 1500   Respiration 2 01/02/25 1500   Circulation 1 01/02/25 1500   Consciousness 2 01/02/25 1500   Oxygen Saturation 2 01/02/25 1500     Modified Krishna Score: 9

## 2025-01-02 NOTE — ASSESSMENT & PLAN NOTE
- prior PCI with non-overlapping ROBERT x2 of sequential proximal and mid LAD, residual mid RCA 80% lesion noted  - on aspirin, metoprolol and atorvastatin

## 2025-01-02 NOTE — ANESTHESIA PROCEDURE NOTES
"Arterial Line Insertion    Performed by: Ronnie Erickson MD  Authorized by: Ronnie Erickson MD  Consent: Verbal consent obtained.  Risks and benefits: risks, benefits and alternatives were discussed  Consent given by: patient  Required items: required blood products, implants, devices, and special equipment available  Patient identity confirmed: hospital-assigned identification number  Time out: Immediately prior to procedure a \"time out\" was called to verify the correct patient, procedure, equipment, support staff and site/side marked as required.  Preparation: Patient was prepped and draped in the usual sterile fashion.  Indications: hemodynamic monitoring  Orientation:  Right  Location: radial artery  Sedation:  Patient sedated: no    Procedure Details:  Agustín's test normal: yes  Needle gauge: 20  Seldinger technique: Seldinger technique used  Number of attempts: 1    Post-procedure:  Post-procedure: dressing applied  Waveform: good waveform  Post-procedure CNS: normal and unchanged  Patient tolerance: patient tolerated the procedure well with no immediate complications          "

## 2025-01-03 VITALS
BODY MASS INDEX: 32.62 KG/M2 | DIASTOLIC BLOOD PRESSURE: 65 MMHG | HEIGHT: 71 IN | OXYGEN SATURATION: 98 % | TEMPERATURE: 97.7 F | SYSTOLIC BLOOD PRESSURE: 123 MMHG | WEIGHT: 233 LBS | RESPIRATION RATE: 14 BRPM | HEART RATE: 78 BPM

## 2025-01-03 DIAGNOSIS — I48.0 PAROXYSMAL A-FIB (HCC): Primary | ICD-10-CM

## 2025-01-03 LAB
ANION GAP SERPL CALCULATED.3IONS-SCNC: 6 MMOL/L (ref 4–13)
ATRIAL RATE: 53 BPM
BUN SERPL-MCNC: 30 MG/DL (ref 5–25)
CALCIUM SERPL-MCNC: 8.9 MG/DL (ref 8.4–10.2)
CHLORIDE SERPL-SCNC: 103 MMOL/L (ref 96–108)
CO2 SERPL-SCNC: 26 MMOL/L (ref 21–32)
CREAT SERPL-MCNC: 1.95 MG/DL (ref 0.6–1.3)
ERYTHROCYTE [DISTWIDTH] IN BLOOD BY AUTOMATED COUNT: 15.6 % (ref 11.6–15.1)
GFR SERPL CREATININE-BSD FRML MDRD: 33 ML/MIN/1.73SQ M
GLUCOSE P FAST SERPL-MCNC: 103 MG/DL (ref 65–99)
GLUCOSE SERPL-MCNC: 103 MG/DL (ref 65–140)
HCT VFR BLD AUTO: 33.5 % (ref 36.5–49.3)
HGB BLD-MCNC: 10.5 G/DL (ref 12–17)
INR PPP: 1.75 (ref 0.85–1.19)
MCH RBC QN AUTO: 29.9 PG (ref 26.8–34.3)
MCHC RBC AUTO-ENTMCNC: 31.3 G/DL (ref 31.4–37.4)
MCV RBC AUTO: 95 FL (ref 82–98)
P AXIS: 68 DEGREES
PLATELET # BLD AUTO: 167 THOUSANDS/UL (ref 149–390)
PMV BLD AUTO: 9.2 FL (ref 8.9–12.7)
POTASSIUM SERPL-SCNC: 5 MMOL/L (ref 3.5–5.3)
PR INTERVAL: 208 MS
PROTHROMBIN TIME: 20.5 SECONDS (ref 12.3–15)
QRS AXIS: 108 DEGREES
QRS AXIS: 91 DEGREES
QRSD INTERVAL: 140 MS
QRSD INTERVAL: 152 MS
QT INTERVAL: 374 MS
QT INTERVAL: 464 MS
QTC INTERVAL: 435 MS
QTC INTERVAL: 450 MS
RBC # BLD AUTO: 3.51 MILLION/UL (ref 3.88–5.62)
SODIUM SERPL-SCNC: 135 MMOL/L (ref 135–147)
T WAVE AXIS: -16 DEGREES
T WAVE AXIS: -54 DEGREES
VENTRICULAR RATE: 53 BPM
VENTRICULAR RATE: 87 BPM
WBC # BLD AUTO: 5.91 THOUSAND/UL (ref 4.31–10.16)

## 2025-01-03 PROCEDURE — 93010 ELECTROCARDIOGRAM REPORT: CPT | Performed by: INTERNAL MEDICINE

## 2025-01-03 PROCEDURE — 80048 BASIC METABOLIC PNL TOTAL CA: CPT | Performed by: PHYSICIAN ASSISTANT

## 2025-01-03 PROCEDURE — 85027 COMPLETE CBC AUTOMATED: CPT | Performed by: PHYSICIAN ASSISTANT

## 2025-01-03 PROCEDURE — 85610 PROTHROMBIN TIME: CPT | Performed by: PHYSICIAN ASSISTANT

## 2025-01-03 RX ADMIN — LISINOPRIL 5 MG: 5 TABLET ORAL at 08:57

## 2025-01-03 RX ADMIN — POTASSIUM CHLORIDE 20 MEQ: 1500 TABLET, EXTENDED RELEASE ORAL at 08:58

## 2025-01-03 RX ADMIN — EZETIMIBE 10 MG: 10 TABLET ORAL at 08:57

## 2025-01-03 RX ADMIN — TORSEMIDE 20 MG: 20 TABLET ORAL at 08:57

## 2025-01-03 RX ADMIN — ACETAMINOPHEN 975 MG: 325 TABLET, FILM COATED ORAL at 12:30

## 2025-01-03 RX ADMIN — DULOXETINE 60 MG: 60 CAPSULE, DELAYED RELEASE ORAL at 08:57

## 2025-01-03 RX ADMIN — APIXABAN 5 MG: 5 TABLET, FILM COATED ORAL at 08:57

## 2025-01-03 RX ADMIN — METHOCARBAMOL 500 MG: 500 TABLET ORAL at 08:58

## 2025-01-03 RX ADMIN — ASPIRIN 81 MG CHEWABLE TABLET 81 MG: 81 TABLET CHEWABLE at 08:57

## 2025-01-03 RX ADMIN — AMIODARONE HYDROCHLORIDE 200 MG: 200 TABLET ORAL at 08:57

## 2025-01-03 RX ADMIN — METOPROLOL SUCCINATE 25 MG: 25 TABLET, FILM COATED, EXTENDED RELEASE ORAL at 08:57

## 2025-01-03 NOTE — PLAN OF CARE
Problem: PAIN - ADULT  Goal: Verbalizes/displays adequate comfort level or baseline comfort level  Description: Interventions:  - Encourage patient to monitor pain and request assistance  - Assess pain using appropriate pain scale  - Administer analgesics based on type and severity of pain and evaluate response  - Implement non-pharmacological measures as appropriate and evaluate response  - Consider cultural and social influences on pain and pain management  - Notify physician/advanced practitioner if interventions unsuccessful or patient reports new pain  Outcome: Progressing     Problem: INFECTION - ADULT  Goal: Absence or prevention of progression during hospitalization  Description: INTERVENTIONS:  - Assess and monitor for signs and symptoms of infection  - Monitor lab/diagnostic results  - Monitor all insertion sites, i.e. indwelling lines, tubes, and drains  - Monitor endotracheal if appropriate and nasal secretions for changes in amount and color  - [place] appropriate cooling/warming therapies per order  - Administer medications as ordered  - Instruct and encourage patient and family to use good hand hygiene technique  - Identify and instruct in appropriate isolation precautions for identified infection/condition  Outcome: Progressing  Goal: Absence of fever/infection during neutropenic period  Description: INTERVENTIONS:  - Monitor WBC    Outcome: Progressing     Problem: SAFETY ADULT  Goal: Patient will remain free of falls  Description: INTERVENTIONS:  - Educate patient/family on patient safety including physical limitations  - Instruct patient to call for assistance with activity   - Consult OT/PT to assist with strengthening/mobility   - Keep Call bell within reach  - Keep bed low and locked with side rails adjusted as appropriate  - Keep care items and personal belongings within reach  - Initiate and maintain comfort rounds  - Make Fall Risk Sign visible to staff  - Offer Toileting every  Hours,  in advance of need  - Initiate/Maintain alarm  - Obtain necessary fall risk management equipment  - Apply yellow socks and bracelet for high fall risk patients  - Consider moving patient to room near nurses station  Outcome: Progressing  Goal: Maintain or return to baseline ADL function  Description: INTERVENTIONS:  -  Assess patient's ability to carry out ADLs; assess patient's baseline for ADL function and identify physical deficits which impact ability to perform ADLs (bathing, care of mouth/teeth, toileting, grooming, dressing, etc.)  - Assess/evaluate cause of self-care deficits   - Assess range of motion  - Assess patient's mobility; develop plan if impaired  - Assess patient's need for assistive devices and provide as appropriate  - Encourage maximum independence but intervene and supervise when necessary  - Involve family in performance of ADLs  - Assess for home care needs following discharge   - Consider OT consult to assist with ADL evaluation and planning for discharge  - Provide patient education as appropriate  Outcome: Progressing  Goal: Maintains/Returns to pre admission functional level  Description: INTERVENTIONS:  - Perform AM-PAC 6 Click Basic Mobility/ Daily Activity assessment daily.  - Set and communicate daily mobility goal to care team and patient/family/caregiver.   - Collaborate with rehabilitation services on mobility goals if consulted  - Perform Range of Motion  times a day.  - Reposition patient every  hours.  - Dangle patient  times a day  - Stand patient  times a day  - Ambulate patient  times a day  - Out of bed to chair  times a day   - Out of bed for meals  times a day  - Out of bed for toileting  - Record patient progress and toleration of activity level   Outcome: Progressing

## 2025-01-07 ENCOUNTER — OFFICE VISIT (OUTPATIENT)
Dept: NEPHROLOGY | Facility: HOSPITAL | Age: 75
End: 2025-01-07
Payer: COMMERCIAL

## 2025-01-07 VITALS — BODY MASS INDEX: 33.33 KG/M2 | DIASTOLIC BLOOD PRESSURE: 68 MMHG | WEIGHT: 239 LBS | SYSTOLIC BLOOD PRESSURE: 120 MMHG

## 2025-01-07 DIAGNOSIS — E21.1 SECONDARY HYPERPARATHYROIDISM, NOT ELSEWHERE CLASSIFIED (HCC): ICD-10-CM

## 2025-01-07 DIAGNOSIS — N18.32 STAGE 3B CHRONIC KIDNEY DISEASE (CKD) (HCC): Primary | ICD-10-CM

## 2025-01-07 DIAGNOSIS — N18.32 ANEMIA DUE TO STAGE 3B CHRONIC KIDNEY DISEASE  (HCC): ICD-10-CM

## 2025-01-07 DIAGNOSIS — R60.0 LOWER EXTREMITY EDEMA: ICD-10-CM

## 2025-01-07 DIAGNOSIS — E66.01 OBESITY, MORBID (HCC): ICD-10-CM

## 2025-01-07 DIAGNOSIS — N02.B9 IGA NEPHROPATHY DETERMINED BY BIOPSY OF KIDNEY: ICD-10-CM

## 2025-01-07 DIAGNOSIS — D63.1 ANEMIA DUE TO STAGE 3B CHRONIC KIDNEY DISEASE  (HCC): ICD-10-CM

## 2025-01-07 DIAGNOSIS — I10 PRIMARY HYPERTENSION: ICD-10-CM

## 2025-01-07 DIAGNOSIS — R80.8 OTHER PROTEINURIA: ICD-10-CM

## 2025-01-07 PROBLEM — E83.39 HYPOPHOSPHATEMIA: Status: RESOLVED | Noted: 2022-06-03 | Resolved: 2025-01-07

## 2025-01-07 PROCEDURE — 99214 OFFICE O/P EST MOD 30 MIN: CPT | Performed by: INTERNAL MEDICINE

## 2025-01-07 NOTE — PROGRESS NOTES
NEPHROLOGY OUTPATIENT PROGRESS NOTE   Tian Garcia 74 y.o. male MRN: [id number]  DATE: 1/7/2025  Reason for visit:   Chief Complaint   Patient presents with    Chronic Kidney Disease    Follow-up        Patient Instructions   Stage 3b chronic kidney disease (CKD) (HCC)  JANEL d/t IgAN, biopsy proven, in setting of recent MRSA infection. Suspect CKD stage 3b  -renal biopsy performed May 26, 2022 was limited as only 6 intact glomeruli were present for evaluation on light microscopy.  IgA dominant immune complex deposition noted by immunofluorescence.  Differential includes IgA nephropathy both primary and secondary forms versus infectious associated glomerulonephritis.  Staph aureus infections have been associated with IgA dominant immune complex deposition.  Patient did have MRSA surgical site infection so infection associated GN should be considered.  -prednisone 60 mg daily begun June 2nd, 2022. S/p prednisone taper, completed 9/1/22  -monitor CMP, CBC   -if this was a primary IgA nephropathy, it would be compatible with [place] classification of M0 E1 S0 T1-C1.  -of 38 glomeruli, 6 were intact, forehead global glomerulosclerosis, segmental sclerosis was absent.  Moderate interstitial fibrosis and tubular atrophy as well as arterial intimal fibrosis and mild arterolar hyalinosis were noted  -did not require dialysis inpatient, permacath removed prior to discharge  -sCr 1.95 as of 1/3/25, slowly improving from 5.34 and seems to have stabilized. 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 Thankfully, proteinuria has  significantly improved  -recommend CKD education - referral placed at prior visit. Call her when you are ready to do this.  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  -Continue metoprolol 25mg twice daily, lisinopril 5mg daily, metoprolol 25mg twice daily, flomax, torsemide 20mg daily   Lower extremity edema  likely d/t nephrotic syndrome as well as history of chronic diastolic CHF (grade 2 diastolic dysfunction noted on April 2022 ech)  - continue torsemide as above, on K supplement, will monitor K/SCr. -Monitor daily weight/BP readings.  Other proteinuria  - UpCr in setting of IgAN shows UpCr improved from 18.8g to microalb:Cr 10mg/g   -resolved  -monitor UpCr and microalb:Cr  Secondary hyperparathyroidism, not elsewhere classified (HCC)  -will check PTH level. Noted in history  Anemia due to stage 3b chronic kidney disease  (HCC)  -Hgb 10.5, repeat CBC and check iron panel  Obesity, morbid (HCC)  -encourage weight loss/exercise  -consider restarting mounjaro    RTC in 4 months.  Obtain blood and urine testing prior to next visit.      Assessment & Plan  Stage 3b chronic kidney disease (CKD) (HCC)  JANEL d/t IgAN, biopsy proven, in setting of recent MRSA infection. Suspect CKD stage 3b  -renal biopsy performed May 26, 2022 was limited as only 6 intact glomeruli were present for evaluation on light microscopy.  IgA dominant immune complex deposition noted by immunofluorescence.  Differential includes IgA nephropathy both primary and secondary forms versus infectious associated glomerulonephritis.  Staph aureus infections have been associated with IgA dominant immune complex deposition.  Patient did have MRSA surgical site infection so infection associated GN should be considered.  -prednisone 60 mg daily begun June 2nd, 2022. S/p prednisone taper, completed 9/1/22  -monitor CMP, CBC   -if this was a primary IgA nephropathy, it would be  compatible with [place] classification of M0 E1 S0 T1-C1.  -of 38 glomeruli, 6 were intact, forehead global glomerulosclerosis, segmental sclerosis was absent.  Moderate interstitial fibrosis and tubular atrophy as well as arterial intimal fibrosis and mild arterolar hyalinosis were noted  -did not require dialysis inpatient, permacath removed prior to discharge  -sCr 1.95 as of 1/3/25, slowly improving from 5.34 and seems to have stabilized. 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 Thankfully, proteinuria has significantly improved  -recommend CKD education - referral placed at prior visit. Call her when you are ready to do this.  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  -Continue metoprolol 25mg twice daily, lisinopril 5mg daily, metoprolol 25mg twice daily, flomax, torsemide 20mg daily   Lower extremity edema  likely d/t nephrotic syndrome as well as history of chronic diastolic CHF (grade 2 diastolic dysfunction noted on April 2022 [facility])  - continue torsemide as above, on K supplement, will monitor K/SCr. -Monitor daily weight/BP readings.  Other proteinuria  - UpCr in setting of IgAN shows UpCr improved from 18.8g to microalb:Cr 10mg/g   -resolved  -monitor UpCr and microalb:Cr  Secondary hyperparathyroidism, not elsewhere classified (HCC)  -will check PTH level. Noted in history  Anemia due to stage 3b chronic kidney disease  (HCC)  -Hgb 10.5, repeat CBC and check iron panel  Obesity, morbid (HCC)  -encourage weight  loss/exercise  -consider restarting mounjaro    RTC in 4 months.  Obtain blood and urine testing prior to next visit.      SUBJECTIVE / INTERVAL HISTORY:  74 y.o. male presents in follow up of CKD.     Tian Garcia s/p recent hospitalization for ablation in setting of afib on 1/2/25.     Feels fatigued and weak today.     His weight is higher today. Had previously lost weight with mounjaro. Stopped this due to expense.    Review of Systems   Constitutional:  Positive for fatigue. Negative for chills and fever.   HENT:  Negative for sore throat.    Eyes:  Negative for visual disturbance.   Respiratory:  Negative for cough and shortness of breath.    Cardiovascular:  Positive for leg swelling. Negative for chest pain.   Gastrointestinal:  Negative for abdominal pain, constipation, diarrhea, nausea and vomiting.   Endocrine: Negative for polyuria.   Genitourinary:  Negative for decreased urine volume, difficulty urinating, dysuria and hematuria.   Musculoskeletal:  Positive for back pain and myalgias.   Skin:  Negative for rash.   Neurological:  Negative for dizziness, light-headedness and numbness.   Psychiatric/Behavioral:  Negative for confusion.        OBJECTIVE:  /68 (BP Location: Left arm, Patient Position: Sitting, Cuff Size: Adult)   Wt 108 kg (239 lb)   BMI 33.33 kg/m²  Body mass index is 33.33 kg/m².    Physical exam:  Physical Exam  Vitals reviewed.   Constitutional:       General: He is not in acute distress.     Appearance: Normal appearance. He is well-developed. He is not diaphoretic.   HENT:      Head: Normocephalic and atraumatic.      Nose: Nose normal.      Mouth/Throat:      Mouth: Mucous membranes are moist.      Pharynx: No oropharyngeal exudate.   Eyes:      General: No scleral icterus.        Right eye: No discharge.         Left eye: No discharge.   Neck:      Thyroid: No thyromegaly.   Cardiovascular:      Rate and Rhythm: Normal rate and regular rhythm.      Heart sounds: Normal  heart sounds.   Pulmonary:      Effort: Pulmonary effort is normal.      Breath sounds: Normal breath sounds. No wheezing or rales.   Abdominal:      General: Bowel sounds are normal. There is no distension.      Palpations: Abdomen is soft.      Tenderness: There is no abdominal tenderness.   Musculoskeletal:         General: No swelling. Normal range of motion.      Cervical back: Neck supple.   Lymphadenopathy:      Cervical: No cervical adenopathy.   Skin:     General: Skin is warm and dry.      Coloration: Skin is not jaundiced.      Findings: No rash.   Neurological:      General: No focal deficit present.      Mental Status: He is alert.      Comments: awake   Psychiatric:         Mood and Affect: Mood normal.         Behavior: Behavior normal.         Medications:    Current Outpatient Medications:     apixaban (ELIQUIS) 5 mg, Take 1 tablet (5 mg total) by mouth 2 (two) times a day, Disp: , Rfl:     Aspirin 81 MG CAPS, Take by mouth, Disp: , Rfl:     atorvastatin (LIPITOR) 80 mg tablet, TAKE 1 TABLET BY MOUTH EVERY EVENING, Disp: 90 tablet, Rfl: 1    DULoxetine (CYMBALTA) 60 mg delayed release capsule, TAKE 1 CAPSULE BY MOUTH EVERY DAY, Disp: 90 capsule, Rfl: 1    ezetimibe (ZETIA) 10 mg tablet, TAKE 1 TABLET BY MOUTH EVERY DAY, Disp: 90 tablet, Rfl: 3    gabapentin (NEURONTIN) 300 mg capsule, TAKE 1 CAPSULE BY MOUTH  DAILY AT BEDTIME, Disp: 90 capsule, Rfl: 1    glucose blood (Accu-Chek Guide) test strip, Use one new strip daily dx r73.01, Disp: 100 strip, Rfl: 1    lisinopril (ZESTRIL) 5 mg tablet, Take 1 tablet (5 mg total) by mouth daily, Disp: 90 tablet, Rfl: 3    methocarbamol (ROBAXIN) 500 mg tablet, Take 1 tablet (500 mg total) by mouth 3 (three) times a day, Disp: 30 tablet, Rfl: 0    metoprolol succinate (TOPROL-XL) 25 mg 24 hr tablet, Take 1 tablet (25 mg total) by mouth 2 (two) times a day, Disp: 180 tablet, Rfl: 3    mometasone (ELOCON) 0.1 % cream, APPLY TO AFFECTED AREA TOPICALLY EVERY DAY,  "Disp: 45 g, Rfl: 1    nitroglycerin (NITROSTAT) 0.4 mg SL tablet, Place 1 tablet (0.4 mg total) under the tongue every 5 (five) minutes as needed for chest pain, Disp: 30 tablet, Rfl: 0    potassium chloride (Klor-Con M20) 20 mEq tablet, Take 1 tablet (20 mEq total) by mouth daily, Disp: 90 tablet, Rfl: 3    tamsulosin (FLOMAX) 0.4 mg, TAKE 1 CAPSULE BY MOUTH EVERY DAY WITH DINNER, Disp: 90 capsule, Rfl: 1    torsemide (DEMADEX) 20 mg tablet, Take 1 tablet (20 mg total) by mouth daily, Disp: 90 tablet, Rfl: 3    zolpidem (AMBIEN) 10 mg tablet, TAKE 1 TABLET BY MOUTH DAILY AT  BEDTIME AS NEEDED FOR SLEEP, Disp: 90 tablet, Rfl: 0    amiodarone 200 mg tablet, Take 1 tablet (200 mg total) by mouth daily Do not start before December 18, 2024. (Patient not taking: Reported on 1/7/2025), Disp: 30 tablet, Rfl: 3    warfarin (COUMADIN) 5 mg tablet, TAKE ONE-HALF TABLET BY MOUTH  DAILY , EXCEPT TAKE 1 TABLET BY  MOUTH ON WEDNESDAY AND SATURDAY, Disp: 58 tablet, Rfl: 1    Allergies:  Allergies as of 01/07/2025 - Reviewed 01/07/2025   Allergen Reaction Noted    Morphine GI Intolerance 09/11/2018    Vitamin k Other (See Comments) 12/28/2020       The following portions of the patient's history were reviewed and updated as appropriate: past family history, past surgical history and problem list.    Laboratory Results:  Lab Results   Component Value Date    SODIUM 135 01/03/2025    K 5.0 01/03/2025     01/03/2025    CO2 26 01/03/2025    BUN 30 (H) 01/03/2025    CREATININE 1.95 (H) 01/03/2025    GLUC 103 01/03/2025    CALCIUM 8.9 01/03/2025        Lab Results   Component Value Date    CALCIUM 8.9 01/03/2025    PHOS 3.6 05/22/2023       Portions of the record may have been created with voice recognition software.  Occasional wrong word or \"sound a like\" substitutions may have occurred due to the inherent limitations of voice recognition software.  Read the chart carefully and recognize, using context, where substitutions have " occurred.

## 2025-01-07 NOTE — ASSESSMENT & PLAN NOTE
JANEL d/t IgAN, biopsy proven, in setting of recent MRSA infection. Suspect CKD stage 3b  -renal biopsy performed May 26, 2022 was limited as only 6 intact glomeruli were present for evaluation on light microscopy.  IgA dominant immune complex deposition noted by immunofluorescence.  Differential includes IgA nephropathy both primary and secondary forms versus infectious associated glomerulonephritis.  Staph aureus infections have been associated with IgA dominant immune complex deposition.  Patient did have MRSA surgical site infection so infection associated GN should be considered.  -prednisone 60 mg daily begun June 2nd, 2022. S/p prednisone taper, completed 9/1/22  -monitor CMP, CBC   -if this was a primary IgA nephropathy, it would be compatible with [place] classification of M0 E1 S0 T1-C1.  -of 38 glomeruli, 6 were intact, forehead global glomerulosclerosis, segmental sclerosis was absent.  Moderate interstitial fibrosis and tubular atrophy as well as arterial intimal fibrosis and mild arterolar hyalinosis were noted  -did not require dialysis inpatient, permacath removed prior to discharge  -sCr 1.95 as of 1/3/25, slowly improving from 5.34 and seems to have stabilized. 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 Thankfully, proteinuria has significantly improved  -recommend CKD education - referral placed at prior visit. Call her when you are ready to do this.

## 2025-01-07 NOTE — ASSESSMENT & PLAN NOTE
- BP acceptable for age/CKD in office.  -Continue metoprolol 25mg twice daily, lisinopril 5mg daily, metoprolol 25mg twice daily, flomax, torsemide 20mg daily

## 2025-01-07 NOTE — ASSESSMENT & PLAN NOTE
likely d/t nephrotic syndrome as well as history of chronic diastolic CHF (grade 2 diastolic dysfunction noted on April 2022 ech)  - continue torsemide as above, on K supplement, will monitor K/SCr. -Monitor daily weight/BP readings.

## 2025-01-07 NOTE — PATIENT INSTRUCTIONS
Stage 3b chronic kidney disease (CKD) (HCC)  JANEL d/t IgAN, biopsy proven, in setting of recent MRSA infection. Suspect CKD stage 3b  -renal biopsy performed May 26, 2022 was limited as only 6 intact glomeruli were present for evaluation on light microscopy.  IgA dominant immune complex deposition noted by immunofluorescence.  Differential includes IgA nephropathy both primary and secondary forms versus infectious associated glomerulonephritis.  Staph aureus infections have been associated with IgA dominant immune complex deposition.  Patient did have MRSA surgical site infection so infection associated GN should be considered.  -prednisone 60 mg daily begun June 2nd, 2022. S/p prednisone taper, completed 9/1/22  -monitor CMP, CBC   -if this was a primary IgA nephropathy, it would be compatible with [place] classification of M0 E1 S0 T1-C1.  -of 38 glomeruli, 6 were intact, forehead global glomerulosclerosis, segmental sclerosis was absent.  Moderate interstitial fibrosis and tubular atrophy as well as arterial intimal fibrosis and mild arterolar hyalinosis were noted  -did not require dialysis inpatient, permacath removed prior to discharge  -sCr 1.95 as of 1/3/25, slowly improving from 5.34 and seems to have stabilized. 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 Thankfully, proteinuria has significantly improved  -recommend CKD education - referral placed at prior visit. Call her when you are ready to do this.  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  As above  Primary hypertension  - BP acceptable for age/CKD  in office.  -Continue metoprolol 25mg twice daily, lisinopril 5mg daily, metoprolol 25mg twice daily, flomax, torsemide 20mg daily   Lower extremity edema  likely d/t nephrotic syndrome as well as history of chronic diastolic CHF (grade 2 diastolic dysfunction noted on April 2022 ech)  - continue torsemide as above, on K supplement, will monitor K/SCr. -Monitor daily weight/BP readings.  Other proteinuria  - UpCr in setting of IgAN shows UpCr improved from 18.8g to microalb:Cr 10mg/g   -resolved  -monitor UpCr and microalb:Cr  Secondary hyperparathyroidism, not elsewhere classified (HCC)  -will check PTH level. Noted in history  Anemia due to stage 3b chronic kidney disease  (HCC)  -Hgb 10.5, repeat CBC and check iron panel  Obesity, morbid (HCC)  -encourage weight loss/exercise  -consider restarting mounjaro    RTC in 4 months.  Obtain blood and urine testing prior to next visit.

## 2025-01-08 ENCOUNTER — ANTICOAG VISIT (OUTPATIENT)
Dept: FAMILY MEDICINE CLINIC | Facility: HOSPITAL | Age: 75
End: 2025-01-08

## 2025-01-08 ENCOUNTER — TELEPHONE (OUTPATIENT)
Dept: FAMILY MEDICINE CLINIC | Facility: HOSPITAL | Age: 75
End: 2025-01-08

## 2025-01-08 DIAGNOSIS — L30.9 ECZEMA, UNSPECIFIED TYPE: ICD-10-CM

## 2025-01-08 DIAGNOSIS — E66.09 CLASS 1 OBESITY DUE TO EXCESS CALORIES WITH SERIOUS COMORBIDITY AND BODY MASS INDEX (BMI) OF 33.0 TO 33.9 IN ADULT: Primary | ICD-10-CM

## 2025-01-08 DIAGNOSIS — I25.10 CORONARY ARTERY DISEASE INVOLVING NATIVE CORONARY ARTERY OF NATIVE HEART WITHOUT ANGINA PECTORIS: Chronic | ICD-10-CM

## 2025-01-08 DIAGNOSIS — E66.811 CLASS 1 OBESITY DUE TO EXCESS CALORIES WITH SERIOUS COMORBIDITY AND BODY MASS INDEX (BMI) OF 33.0 TO 33.9 IN ADULT: Primary | ICD-10-CM

## 2025-01-08 DIAGNOSIS — G95.9 CERVICAL MYELOPATHY (HCC): ICD-10-CM

## 2025-01-08 RX ORDER — TIRZEPATIDE 2.5 MG/.5ML
2.5 INJECTION, SOLUTION SUBCUTANEOUS WEEKLY
Qty: 2 ML | Refills: 0 | Status: SHIPPED | OUTPATIENT
Start: 2025-01-08 | End: 2025-02-05

## 2025-01-09 RX ORDER — MOMETASONE FUROATE 1 MG/G
CREAM TOPICAL
Qty: 45 G | Refills: 1 | Status: SHIPPED | OUTPATIENT
Start: 2025-01-09

## 2025-01-09 RX ORDER — DULOXETIN HYDROCHLORIDE 60 MG/1
60 CAPSULE, DELAYED RELEASE ORAL DAILY
Qty: 90 CAPSULE | Refills: 1 | Status: SHIPPED | OUTPATIENT
Start: 2025-01-09

## 2025-01-09 RX ORDER — ATORVASTATIN CALCIUM 80 MG/1
80 TABLET, FILM COATED ORAL EVERY EVENING
Qty: 90 TABLET | Refills: 1 | Status: SHIPPED | OUTPATIENT
Start: 2025-01-09

## 2025-01-10 ENCOUNTER — TELEPHONE (OUTPATIENT)
Dept: FAMILY MEDICINE CLINIC | Facility: HOSPITAL | Age: 75
End: 2025-01-10

## 2025-01-10 NOTE — TELEPHONE ENCOUNTER
PA for (Zepbound) 2.5 mg/0.5 mL auto-injector SUBMITTED to [employer]    via    []CMM-KEY:   [x]Surescripts-Case ID # [id number]  []Availity-Auth ID # NDC #   []Faxed to plan   []Other website   []Phone call Case ID #     []PA sent as URGENT    All office notes, labs and other pertaining documents and studies sent. Clinical questions answered. Awaiting determination from insurance company.     Turnaround time for your insurance to make a decision on your Prior Authorization can take 7-21 business days.

## 2025-01-10 NOTE — TELEPHONE ENCOUNTER
Insurance Prior Authorization representative called to verify Sleep Apnea comorbidity for patient. Verified Sleep Apnea is listed on patients current Problem list, updated 01/08.

## 2025-01-13 ENCOUNTER — TELEPHONE (OUTPATIENT)
Dept: CARDIAC SURGERY | Facility: CLINIC | Age: 75
End: 2025-01-13

## 2025-01-13 NOTE — TELEPHONE ENCOUNTER
Spoke with patient, informed him Dr. Zelaya reviewed the DSE results which shows no severe Aortic Valve Stenosis. Dr. Collins recommends patient continues to follow with Dr. Lock. Message sent to Dr. Lock.

## 2025-01-13 NOTE — TELEPHONE ENCOUNTER
PA for (Zepbound) 2.5 mg/0.5 mL auto-injector APPROVED     Date(s) approved 01/10/25-12/31/26    Case # [id number]    Patient advised by          [][employer] Message  []Phone call   [x]LMOM  []L/M to call office as no active Communication consent on file  []Unable to leave detailed message as VM not approved on Communication consent       Pharmacy advised by    [x]Fax  []Phone call    Approval letter scanned into [employer]

## 2025-01-17 NOTE — TELEPHONE ENCOUNTER
Pt called to check status of Zepbound. I informed him that it was approved and at [facility]. He is going to call to make sure still there, will call back if any problems.    rupa

## 2025-01-21 ENCOUNTER — OFFICE VISIT (OUTPATIENT)
Dept: CARDIOLOGY CLINIC | Facility: CLINIC | Age: 75
End: 2025-01-21
Payer: COMMERCIAL

## 2025-01-21 VITALS
HEIGHT: 71 IN | BODY MASS INDEX: 33.32 KG/M2 | DIASTOLIC BLOOD PRESSURE: 52 MMHG | HEART RATE: 57 BPM | SYSTOLIC BLOOD PRESSURE: 94 MMHG | WEIGHT: 238 LBS

## 2025-01-21 DIAGNOSIS — I48.0 PAROXYSMAL A-FIB (HCC): Primary | ICD-10-CM

## 2025-01-21 DIAGNOSIS — I35.0 NONRHEUMATIC AORTIC VALVE STENOSIS: ICD-10-CM

## 2025-01-21 DIAGNOSIS — I42.0 DILATED CARDIOMYOPATHY (HCC): ICD-10-CM

## 2025-01-21 PROCEDURE — 99215 OFFICE O/P EST HI 40 MIN: CPT | Performed by: INTERNAL MEDICINE

## 2025-01-21 NOTE — PATIENT INSTRUCTIONS
Check INR    Check an ECHO in March (schedule it after March 2nd).    Hold lisinopril if systolic blood pressure is 100 mmHg

## 2025-01-21 NOTE — PROGRESS NOTES
EPS Follow-up Patient Evaluation - Tian Garcia 74 y.o. male MRN: [id number]       Referring:Dr. Lock    CC/HPI:   It was a pleasure to see Tian Garcia in our arrhythmia clinic at [facility]. As you know he is a 74 y.o. man with WILL on CPAP, arthirits, factor V Leiden, DVT and paorxysmal atrial fibrillation who presented 12/4/24 to discuss management of atrial fibrillation.     Patient had diagnosis of atrial fibrillation that had been paroxysmal in the past.  He reported having ablation done in the past though chart review did not show any procedure report.  He had cardioversions in the past as well.  More recently after he is second knee procedure he had been having increased burden of atrial fibrillation.  He has remained in atrial fibrillation over the past several days with higher heart rate.  His metoprolol dose was initially increased in the hospital for RVR however it was reduced and follow-up due to patient having dizziness.    He felt fatigue but per wife, does not indorse much symptoms normally. He denied any significant swelling in legs.     Interval history:     After previous visit, Tian under PVI, posterior wall isolation, mitral flutter ablation on 1/2/24. He had CTI line placed previously and it had bidirectional block across it.     He stopped taking amiodarone once he ran out.     He reports feeling well since the ablation. Denies palpitations. He is wearing zio monitor.     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, 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, on steroids for prolonged period    Ambulatory dysfunction     cane use    Anemia     BPH (benign prostatic hyperplasia)     CHF (congestive heart failure) (Formerly [facility])     Chronic kidney disease     stage 3b, baseline Cr 2.2    DDD (degenerative disc disease), cervical     DDD (degenerative disc disease), lumbar      Diverticulosis     Factor V Leiden (HCC)     History of DVT (deep vein thrombosis)     from Facotr V, on Coumadin    [place] hearing[place]     Hyperlipidemia     Hypertension     Insomnia     Ischemic cardiomyopathy     EF 33%    MRSA carrier     Neuropathy     Obesity     WILL on CPAP     Osteoarthritis     Other acute osteomyelitis, other site (HCC) 01/03/2023    Paroxysmal atrial fibrillation (HCC)     s/p ablation, s/p DCCV, Coumadin, on Amiodarone for    RBBB     Rotator cuff tear     right    Severe aortic stenosis     Steroid-induced hyperglycemia     requiring insulin, w/ MRSA infection 2022, resolved       Medications:      Current Outpatient Medications:     amiodarone 200 mg tablet, Take 1 tablet (200 mg total) by mouth daily Do not start before December 18, 2024., Disp: 30 tablet, Rfl: 3    Aspirin 81 MG CAPS, Take by mouth, Disp: , Rfl:     atorvastatin (LIPITOR) 80 mg tablet, TAKE 1 TABLET BY MOUTH EVERY EVENING, Disp: 90 tablet, Rfl: 1    DULoxetine (CYMBALTA) 60 mg delayed release capsule, TAKE 1 CAPSULE BY MOUTH EVERY DAY, Disp: 90 capsule, Rfl: 1    ezetimibe (ZETIA) 10 mg tablet, TAKE 1 TABLET BY MOUTH EVERY DAY, Disp: 90 tablet, Rfl: 3    gabapentin (NEURONTIN) 300 mg capsule, TAKE 1 CAPSULE BY MOUTH  DAILY AT BEDTIME, Disp: 90 capsule, Rfl: 1    glucose blood (Accu-Chek Guide) test strip, Use one new strip daily dx r73.01, Disp: 100 strip, Rfl: 1    lisinopril (ZESTRIL) 5 mg tablet, Take 1 tablet (5 mg total) by mouth daily, Disp: 90 tablet, Rfl: 3    metoprolol succinate (TOPROL-XL) 25 mg 24 hr tablet, Take 1 tablet (25 mg total) by mouth 2 (two) times a day, Disp: 180 tablet, Rfl: 3    mometasone (ELOCON) 0.1 % cream, APPLY TO AFFECTED AREA TOPICALLY EVERY DAY, Disp: 45 g, Rfl: 1    nitroglycerin (NITROSTAT) 0.4 mg SL tablet, Place 1 tablet (0.4 mg total) under the tongue every 5 (five) minutes as needed for chest pain, Disp: 30 tablet, Rfl: 0    potassium chloride (Klor-Con M20) 20 mEq tablet,  Take 1 tablet (20 mEq total) by mouth daily, Disp: 90 tablet, Rfl: 3    tamsulosin (FLOMAX) 0.4 mg, TAKE 1 CAPSULE BY MOUTH EVERY DAY WITH DINNER, Disp: 90 capsule, Rfl: 1    torsemide (DEMADEX) 20 mg tablet, Take 1 tablet (20 mg total) by mouth daily, Disp: 90 tablet, Rfl: 3    warfarin (COUMADIN) 5 mg tablet, TAKE ONE-HALF TABLET BY MOUTH  DAILY , EXCEPT TAKE 1 TABLET BY  MOUTH ON WEDNESDAY AND SATURDAY (Patient taking differently: 1/21 - 5mg on Tues, 2.5mg rest of the week), Disp: 58 tablet, Rfl: 1    zolpidem (AMBIEN) 10 mg tablet, TAKE 1 TABLET BY MOUTH DAILY AT  BEDTIME AS NEEDED FOR SLEEP, Disp: 90 tablet, Rfl: 0    methocarbamol (ROBAXIN) 500 mg tablet, Take 1 tablet (500 mg total) by mouth 3 (three) times a day (Patient not taking: Reported on 1/21/2025), Disp: 30 tablet, Rfl: 0    tirzepatide (Zepbound) 2.5 mg/0.5 mL auto-injector, Inject 0.5 mL (2.5 mg total) under the skin once a week for 28 days (Patient not taking: Reported on 1/21/2025), Disp: 2 mL, Rfl: 0     Family History   Problem Relation Age of Onset    Lung cancer Mother     Hearing loss Father     Emphysema Sister     Heart attack Brother     Atrial fibrillation Brother     Heart failure Brother     Other (atrial fib) Brother     Clotting disorder Son      Social History     Socioeconomic History    Marital status: /Civil Union     Spouse name: Elsy    Number of children: 4    Years of education: Not on file    Highest education level: Not on file   Occupational History    Occupation: Retired   Tobacco Use    Smoking status: Never    Smokeless tobacco: Never   Vaping Use    Vaping status: Never Used   Substance and Sexual Activity    Alcohol use: Not Currently    Drug use: No    Sexual activity: Not Currently     Partners: Female   Other Topics Concern    Not on file   Social History Narrative    Caffeine use: 2 cups coffee a day     Social Drivers of Health     Financial Resource Strain: Medium Risk (2/7/2024)    Overall Financial  "Resource Strain (CARDIA)     Difficulty of Paying Living Expenses: Somewhat hard   Food Insecurity: No Food Insecurity (1/3/2023)    Hunger Vital Sign     Worried About Running Out of Food in the Last Year: Never true     Ran Out of Food in the Last Year: Never true   Transportation Needs: No Transportation Needs (2/7/2024)    PRAPARE - Transportation     Lack of Transportation (Medical): No     Lack of Transportation (Non-Medical): No   Physical Activity: Not on file   Stress: Not on file   Social Connections: Not on file   Intimate Partner Violence: Not on file   Housing Stability: Low Risk  (5/11/2022)    Housing Stability Vital Sign     Unable to Pay for Housing in the Last Year: No     Number of Places Lived in the Last Year: 1     Unstable Housing in the Last Year: No     Social History     Tobacco Use   Smoking Status Never   Smokeless Tobacco Never     Social History     Substance and Sexual Activity   Alcohol Use Not Currently       Review of Systems   Constitutional: Negative for chills, fever and malaise/fatigue.   HENT: Negative.     Eyes:  Negative for blurred vision and double vision.   Cardiovascular:  Positive for leg swelling. Negative for chest pain, dyspnea on exertion, near-syncope, orthopnea, palpitations, paroxysmal nocturnal dyspnea and syncope.   Respiratory:  Negative for cough and sputum production.    Endocrine: Negative.    Skin: Negative.  Negative for rash.   Musculoskeletal: Negative.  Negative for arthritis and joint pain.   Gastrointestinal:  Negative for abdominal pain, nausea and vomiting.   Genitourinary: Negative.    Neurological: Negative.  Negative for dizziness and light-headedness.   Psychiatric/Behavioral: Negative.  The patient is not nervous/anxious.         Objective:     Vitals: Blood pressure 94/52, pulse 57, height 5' 11\" (1.803 m), weight 108 kg (238 lb)., Body mass index is 33.19 kg/m².,        Physical Exam:    GEN: Tian Garcia appears well, alert and oriented x " 3, pleasant and cooperative   HEENT: pupils equal, round, and reactive to light; extraocular muscles intact  NECK: supple, no carotid bruits   HEART: regular rate and rhythm, normal S1 and S2, no murmurs, clicks, gallops or rubs   LUNGS: clear to auscultation bilaterally; no wheezes, rales, or rhonchi   ABDOMEN: normal bowel sounds, soft, no tenderness, no distention  EXTREMITIES: peripheral pulses normal; no clubbing, cyanosis, or edema  NEURO: no focal findings   SKIN: normal without suspicious lesions on exposed skin      Labs & Results:  Below is the patient's most recent value for Albumin, ALT, AST, BUN, Calcium, Chloride, Cholesterol, CO2, Creatinine, GFR, Glucose, HDL, Hematocrit, Hemoglobin, Hemoglobin A1C, LDL, Magnesium, Phosphorus, Platelets, Potassium, PSA, Sodium, Triglycerides, and WBC.   Lab Results   Component Value Date    ALT 17 12/27/2024    AST 16 12/27/2024    BUN 30 (H) 01/03/2025    CALCIUM 8.9 01/03/2025     01/03/2025    CHOL 151 10/25/2017    CO2 26 01/03/2025    CREATININE 1.95 (H) 01/03/2025    HDL 58 11/12/2024    HCT 33.5 (L) 01/03/2025    HGB 10.5 (L) 01/03/2025    HGBA1C 6.2 (H) 04/09/2023    MG 2.2 04/18/2023    PHOS 3.6 05/22/2023     01/03/2025    K 5.0 01/03/2025    PSA 1.2 01/09/2023     10/25/2017    TRIG 72 11/12/2024    WBC 5.91 01/03/2025     Note: for a comprehensive list of the patient's lab results, access the Results Review activity.          Cardiac testing:     I personally reviewed the ECG performed in the clinic on 01/21/25. It reveals sinus bradycardia, RBBB.    Echocardiograms:  No results found for this or any previous visit.    No results found for this or any previous visit.      Catheterizations:   No results found for this or any previous visit.      Stress Tests:  No results found for this or any previous visit.      Holter monitor -   No results found for this or any previous visit.    No results found for this or any previous  visit.        ASSESSMENT/PLAN:  Paroxysmal atrial fibrillation  Patient had episode of atrial fibrillation with RVR post TKA surgery  Metoprolol succinate dose was increased however he could not tolerated  Dose was reduced to 25 mg daily  Currently maintained on warfarin for anticoagulation  We discussed options of controlling rhythm with either antiarrhythmic therapy versus ablation procedure given asymptomatic nature of atrial fibrillation and cardiomyopathy  After answering all the question he opted to proceed with ablation procedure  He will benefit from further rate control though his sinus rate will get slow  Started amiodarone 200 mg twice daily  x 2 weeks then 200 mg daily (INR has been therapeutic in the past month)  Check INR twice weekly  Reduce metoprolol to 25 mg once daily in one week  S/p PVI, posterior wall isolation and anterior mitral flutter line placement  Remains in normal rhythm  He discontinued amiodarone as he ran out  Recommended he check his INR  Will need repeat ECHO in 3 months to evaluate EF  Currently wearing Zio monitor   Severe aortic stenosis  Noted on recent ECHO  Though not high on gradient  Could be due to decrease flow  Dobutamine stress ECHO confirmed psuedo AS  CAD  Patient had ST elevation MI in April 2023  Underwent PCI to proximal to mid LAD  Maintained on aspirin, atorvastatin and Toprol-XL  Denies angina  Ischemic cardiomyopathy  Echocardiogram had shown ejection fraction of 37% but most recently echocardiogram showed an ejection fraction of 33% as he was in atrial fibrillation at the time  Currently on goal-directed medical therapy  Will repeat ECHO in 3 months   Hold lisinopril if SBP is less than 100 mmHg  DVT/factor V leiden def  Maintained on AC

## 2025-01-22 LAB — SL CV LV EF: 35

## 2025-01-22 PROCEDURE — 93000 ELECTROCARDIOGRAM COMPLETE: CPT | Performed by: INTERNAL MEDICINE

## 2025-01-23 DIAGNOSIS — I48.0 PAROXYSMAL A-FIB (HCC): Primary | ICD-10-CM

## 2025-01-23 NOTE — TELEPHONE ENCOUNTER
Samples of this drug were given to the patient, quantity 28, Lot Number [id number]. The following was discussed with the patient as indicated: administration, storage, potential interactions, side effects.

## 2025-01-24 ENCOUNTER — OFFICE VISIT (OUTPATIENT)
Dept: FAMILY MEDICINE CLINIC | Facility: HOSPITAL | Age: 75
End: 2025-01-24
Payer: COMMERCIAL

## 2025-01-24 VITALS
SYSTOLIC BLOOD PRESSURE: 110 MMHG | HEART RATE: 56 BPM | DIASTOLIC BLOOD PRESSURE: 68 MMHG | HEIGHT: 71 IN | BODY MASS INDEX: 33.32 KG/M2 | WEIGHT: 238 LBS

## 2025-01-24 DIAGNOSIS — I48.0 PAROXYSMAL A-FIB (HCC): ICD-10-CM

## 2025-01-24 DIAGNOSIS — Z99.89 USE OF CANE AS AMBULATORY AID: ICD-10-CM

## 2025-01-24 DIAGNOSIS — I73.9 PERIPHERAL VASCULAR DISEASE, UNSPECIFIED (HCC): ICD-10-CM

## 2025-01-24 DIAGNOSIS — Z96.652 S/P TOTAL KNEE REPLACEMENT USING CEMENT, LEFT: ICD-10-CM

## 2025-01-24 DIAGNOSIS — I25.10 ATHEROSCLEROSIS OF NATIVE CORONARY ARTERY OF NATIVE HEART WITHOUT ANGINA PECTORIS: ICD-10-CM

## 2025-01-24 DIAGNOSIS — Z79.01 ANTICOAGULATED ON WARFARIN: ICD-10-CM

## 2025-01-24 DIAGNOSIS — D63.1 ANEMIA DUE TO STAGE 3B CHRONIC KIDNEY DISEASE  (HCC): ICD-10-CM

## 2025-01-24 DIAGNOSIS — N18.32 STAGE 3B CHRONIC KIDNEY DISEASE (CKD) (HCC): ICD-10-CM

## 2025-01-24 DIAGNOSIS — I42.0 DILATED CARDIOMYOPATHY (HCC): ICD-10-CM

## 2025-01-24 DIAGNOSIS — N18.32 ANEMIA DUE TO STAGE 3B CHRONIC KIDNEY DISEASE  (HCC): ICD-10-CM

## 2025-01-24 DIAGNOSIS — D68.2 HEREDITARY DEFICIENCY OF OTHER CLOTTING FACTORS (HCC): ICD-10-CM

## 2025-01-24 DIAGNOSIS — R73.01 IMPAIRED FASTING BLOOD SUGAR: ICD-10-CM

## 2025-01-24 DIAGNOSIS — G95.9 CERVICAL MYELOPATHY (HCC): ICD-10-CM

## 2025-01-24 DIAGNOSIS — R26.2 AMBULATORY DYSFUNCTION: ICD-10-CM

## 2025-01-24 DIAGNOSIS — E21.1 SECONDARY HYPERPARATHYROIDISM, NOT ELSEWHERE CLASSIFIED (HCC): ICD-10-CM

## 2025-01-24 DIAGNOSIS — E66.01 OBESITY, MORBID (HCC): ICD-10-CM

## 2025-01-24 DIAGNOSIS — D68.51 FACTOR V LEIDEN MUTATION (HCC): ICD-10-CM

## 2025-01-24 DIAGNOSIS — M54.41 ACUTE BILATERAL LOW BACK PAIN WITH RIGHT-SIDED SCIATICA: Primary | ICD-10-CM

## 2025-01-24 DIAGNOSIS — I10 PRIMARY HYPERTENSION: ICD-10-CM

## 2025-01-24 PROCEDURE — 99214 OFFICE O/P EST MOD 30 MIN: CPT | Performed by: INTERNAL MEDICINE

## 2025-01-24 RX ORDER — METHOCARBAMOL 500 MG/1
500 TABLET, FILM COATED ORAL 3 TIMES DAILY
Qty: 30 TABLET | Refills: 0 | Status: SHIPPED | OUTPATIENT
Start: 2025-01-24

## 2025-01-24 NOTE — PROGRESS NOTES
Assessment/Plan:     Diagnosis ICD-10-CM Associated Orders   1. Acute bilateral low back pain with right-sided sciatica  M54.41 XR spine lumbar minimum 4 views non injury     Ambulatory Referral to [facility]      2. Paroxysmal A-fib/flutter ([facility])  I48.0       3. Dilated cardiomyopathy ([facility])  I42.0       4. Atherosclerosis of native coronary artery of native heart without angina pectoris  I25.10       5. Peripheral vascular disease, unspecified ([facility])  I73.9       6. Primary hypertension  I10 Lipid Panel with Direct LDL reflex     Lipid Panel with Direct LDL reflex      7. Secondary hyperparathyroidism, not elsewhere classified ([facility])  E21.1       8. Cervical myelopathy ([facility])  G95.9       9. Anemia due to stage 3b chronic kidney disease  ([facility])  N18.32     D63.1       10. Stage 3b chronic kidney disease (CKD) ([facility])  N18.32       11. Factor V Leiden mutation ([facility])  D68.51       12. 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[facility])  D68.2       13. Anticoagulated on warfarin  Z79.01       14. Ambulatory dysfunction  R26.2       15. Obesity, morbid ([facility])  E66.01       16. Use of cane as ambulatory aid  Z99.89       17. Impaired fasting blood sugar  R73.01 Hemoglobin A1C     Hemoglobin A1C      18. S/P total knee replacement using cement, left  Z96.652 methocarbamol (ROBAXIN) 500 mg tablet          Problem List Items Addressed This Visit        Cardiovascular and Mediastinum    Paroxysmal A-fib/flutter ([facility])    Had ablation with Dr. Amin   on 1/7/25 for atrial flutter   Is now off the amiodarone-and was bridged with Eliquis-back on warfarin         Primary hypertension    Lisinopril 5 mg daily a nd metoprolol xl 25 bid   Had lower bp at visit with cardiology last week and now improved         Relevant Orders    Lipid Panel with Direct LDL reflex    Atherosclerosis of native coronary artery of native heart without angina pectoris    No angina pain- had MI April 2023- no chest pain since then          Dilated cardiomyopathy ([facility])    Had echo dec 2024 and to have repeat done in April before seeing Cardiology in April.         Peripheral vascular disease, unspecified ([facility])    No leg cramping             Endocrine    Secondary hyperparathyroidism, not elsewhere classified ([facility])    Sees Dr. Covarrubias            Nervous and Auditory    Cervical myelopathy ([facility])    Had  surgery - prior to surgery  had cervical stenosis- Dr. Pastrana   Had mrsa wound infection            Genitourinary    Anemia due to stage 3b chronic kidney disease  ([facility])    Lab Results   Component Value Date    EGFR 33 01/03/2025    EGFR 32 01/02/2025    EGFR 43 (L) 12/27/2024    CREATININE 1.95 (H) 01/03/2025    CREATININE 1.97 (H) 01/02/2025    CREATININE 1.68 (H) 12/27/2024   Follows with Dr. Covarrubias         Stage 3b chronic kidney disease (CKD) ([facility])       Hematopoietic and Hemostatic    Factor V Leiden mutation ([facility])    Had hx of dvt inpast         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[facility])    Leiden V and brother has it and one of his son has tested postive            Blood    Anticoagulated on warfarin       Care Coordination    Ambulatory dysfunction       Neurology/Sleep    Use of cane as ambulatory aid    Balance issues and back pain            Other    Obesity, morbid ([facility])    Had been given rx with monjaro but difficulty affording  Lsot 30 lbs- went off the med in october        Other Visit Diagnoses       Acute bilateral low back pain with right-sided sciatica    -  Primary    Relevant Orders    XR spine lumbar minimum 4 views non injury    Ambulatory Referral to [facility]      Impaired fasting blood sugar        Relevant Orders    Hemoglobin A1C      S/P total knee replacement using cement, left        Relevant Medications    methocarbamol (ROBAXIN) 500 mg tablet            Return in about 4 weeks (around 2/21/2025) for Annual Medicare Wellness.      Subjective:    Patient ID: Tian Garcia is a 74 y.o.  male    Started with low back pain before Christmas and had low back pain with  right sciatica- then had  it  goo to left side into  lower sacral area.   Prior surgery more than 20 years ago- - L4-L5  Has  feeling like bones hurt in pelvis and legs- he is concerned it may be residual issues from prior  MRSA infection.   With pain lasting more than 6 weeks- will check xray   Refer back to PT            The following portions of the patient's history were reviewed and updated as appropriate: allergies, current medications and problem list.     Review of Systems   HENT:  Negative for congestion.    Respiratory:  Negative for shortness of breath.    Musculoskeletal:  Positive for arthralgias, back pain and myalgias.   Skin:  Negative for rash and wound.         Objective:      Current Outpatient Medications:   •  Aspirin 81 MG CAPS, Take by mouth, Disp: , Rfl:   •  atorvastatin (LIPITOR) 80 mg tablet, TAKE 1 TABLET BY MOUTH EVERY EVENING, Disp: 90 tablet, Rfl: 1  •  DULoxetine (CYMBALTA) 60 mg delayed release capsule, TAKE 1 CAPSULE BY MOUTH EVERY DAY, Disp: 90 capsule, Rfl: 1  •  ezetimibe (ZETIA) 10 mg tablet, TAKE 1 TABLET BY MOUTH EVERY DAY, Disp: 90 tablet, Rfl: 3  •  gabapentin (NEURONTIN) 300 mg capsule, TAKE 1 CAPSULE BY MOUTH  DAILY AT BEDTIME, Disp: 90 capsule, Rfl: 1  •  glucose blood (Accu-Chek Guide) test strip, Use one new strip daily dx r73.01, Disp: 100 strip, Rfl: 1  •  lisinopril (ZESTRIL) 5 mg tablet, Take 1 tablet (5 mg total) by mouth daily, Disp: 90 tablet, Rfl: 3  •  methocarbamol (ROBAXIN) 500 mg tablet, Take 1 tablet (500 mg total) by mouth 3 (three) times a day, Disp: 30 tablet, Rfl: 0  •  metoprolol succinate (TOPROL-XL) 25 mg 24 hr tablet, Take 1 tablet (25 mg total) by mouth 2 (two) times a day, Disp: 180 tablet, Rfl: 3  •  mometasone (ELOCON) 0.1 % cream, APPLY TO AFFECTED AREA TOPICALLY EVERY DAY, Disp: 45 g, Rfl: 1  •  potassium chloride (Klor-Con M20) 20 mEq tablet, Take 1  "tablet (20 mEq total) by mouth daily, Disp: 90 tablet, Rfl: 3  •  tamsulosin (FLOMAX) 0.4 mg, TAKE 1 CAPSULE BY MOUTH EVERY DAY WITH DINNER, Disp: 90 capsule, Rfl: 1  •  torsemide (DEMADEX) 20 mg tablet, Take 1 tablet (20 mg total) by mouth daily, Disp: 90 tablet, Rfl: 3  •  warfarin (COUMADIN) 5 mg tablet, TAKE ONE-HALF TABLET BY MOUTH  DAILY , EXCEPT TAKE 1 TABLET BY  MOUTH ON WEDNESDAY AND SATURDAY, Disp: 58 tablet, Rfl: 1  •  zolpidem (AMBIEN) 10 mg tablet, TAKE 1 TABLET BY MOUTH DAILY AT  BEDTIME AS NEEDED FOR SLEEP, Disp: 90 tablet, Rfl: 0  •  nitroglycerin (NITROSTAT) 0.4 mg SL tablet, Place 1 tablet (0.4 mg total) under the tongue every 5 (five) minutes as needed for chest pain (Patient not taking: Reported on 1/24/2025), Disp: 30 tablet, Rfl: 0    Blood pressure 110/68, pulse 56, height 5' 11\" (1.803 m), weight 108 kg (238 lb).     Physical Exam  Vitals and nursing note reviewed.   HENT:      Head: Normocephalic.      Nose: No congestion.      Mouth/Throat:      Pharynx: No posterior oropharyngeal erythema.   Eyes:      Pupils: Pupils are equal, round, and reactive to light.   Cardiovascular:      Rate and Rhythm: Normal rate and regular rhythm.      Heart sounds: No murmur heard.  Pulmonary:      Breath sounds: No wheezing or rhonchi.   Abdominal:      General: There is no distension.      Palpations: Abdomen is soft.      Tenderness: There is no guarding.   Musculoskeletal:         General: Tenderness present. No swelling.      Cervical back: No tenderness.      Comments: Lower lumbar across  l4- s1   Lymphadenopathy:      Cervical: No cervical adenopathy.   Skin:     Findings: No erythema.   Neurological:      Mental Status: He is alert and oriented to person, place, and time.      Cranial Nerves: No cranial nerve deficit.   Psychiatric:         Mood and Affect: Mood normal.         Thought Content: Thought content normal.         Judgment: Judgment normal.        "

## 2025-01-24 NOTE — ASSESSMENT & PLAN NOTE
Had ablation with Dr. Amin   on 1/7/25 for atrial flutter   Is now off the amiodarone-and was bridged with Eliquis-back on warfarin

## 2025-01-24 NOTE — ASSESSMENT & PLAN NOTE
Lisinopril 5 mg daily a nd metoprolol xl 25 bid   Had lower bp at visit with cardiology last week and now improved

## 2025-01-24 NOTE — ASSESSMENT & PLAN NOTE
Lab Results   Component Value Date    EGFR 33 01/03/2025    EGFR 32 01/02/2025    EGFR 43 (L) 12/27/2024    CREATININE 1.95 (H) 01/03/2025    CREATININE 1.97 (H) 01/02/2025    CREATININE 1.68 (H) 12/27/2024   Follows with Dr. Covarrubias

## 2025-01-25 ENCOUNTER — HOSPITAL ENCOUNTER (OUTPATIENT)
Dept: RADIOLOGY | Facility: HOSPITAL | Age: 75
Discharge: HOME | End: 2025-01-25
Payer: COMMERCIAL

## 2025-01-25 DIAGNOSIS — M54.41 ACUTE BILATERAL LOW BACK PAIN WITH RIGHT-SIDED SCIATICA: ICD-10-CM

## 2025-01-25 PROCEDURE — 72110 X-RAY EXAM L-2 SPINE 4/>VWS: CPT

## 2025-01-28 ENCOUNTER — ANTICOAG VISIT (OUTPATIENT)
Dept: FAMILY MEDICINE CLINIC | Facility: HOSPITAL | Age: 75
End: 2025-01-28

## 2025-01-29 ENCOUNTER — RESULTS FOLLOW-UP (OUTPATIENT)
Dept: FAMILY MEDICINE CLINIC | Facility: HOSPITAL | Age: 75
End: 2025-01-29

## 2025-02-03 DIAGNOSIS — R26.89 BALANCE DISORDER: Primary | ICD-10-CM

## 2025-02-03 DIAGNOSIS — M47.896 OTHER OSTEOARTHRITIS OF SPINE, LUMBAR REGION: ICD-10-CM

## 2025-02-03 DIAGNOSIS — Z99.89 USE OF CANE AS AMBULATORY AID: ICD-10-CM

## 2025-02-07 ENCOUNTER — EVALUATION (OUTPATIENT)
Dept: PHYSICAL THERAPY | Facility: CLINIC | Age: 75
End: 2025-02-07
Payer: COMMERCIAL

## 2025-02-07 DIAGNOSIS — M54.41 ACUTE BILATERAL LOW BACK PAIN WITH RIGHT-SIDED SCIATICA: ICD-10-CM

## 2025-02-07 PROCEDURE — 97110 THERAPEUTIC EXERCISES: CPT | Performed by: PHYSICAL THERAPIST

## 2025-02-07 PROCEDURE — 97162 PT EVAL MOD COMPLEX 30 MIN: CPT | Performed by: PHYSICAL THERAPIST

## 2025-02-07 NOTE — PROGRESS NOTES
PT Evaluation     Today's date: 2025  Patient name: Tian Garcia  : 1950  MRN: [id number]  Referring provider: Aneta Santillan DO  Dx:   Encounter Diagnosis     ICD-10-CM    1. Acute bilateral low back pain with right-sided sciatica  M54.41 Ambulatory Referral to [facility]                     Assessment  Impairments: abnormal gait, abnormal or restricted ROM, activity intolerance, impaired physical strength, lacks appropriate home exercise program and pain with function  Symptom irritability: moderate    Assessment details: Tian Garcia is a 74 y.o. male presenting to outpatient physical therapy with chief complaints of lower back pain. Patient describes aggravating his lower back with decorating for [place]. Patient displays with abnormal gait, abnormal posture, restricted lumbar AROM, (B) LE strength deficits, (+) DERRICK, (+) SLR, and functional restrictions.  Patient's symptoms are multifactorial in nature with a primary movement diagnosis of lumbar hypomobility resulting in pathoanatomical signs and symptoms consistent with Acute bilateral low back pain with right-sided sciatica resulting in limitations in his ability to walk without AD and participate in recreational activities.  No further referral appears necessary at this time based upon examination results.    Please contact me if you have any questions. Thank you for the opportunity to share in the care of this patient.        Understanding of Dx/Px/POC: good     Prognosis: good  Prognosis details: Positive prognostic indicators include positive attitude toward recovery, motivated to improve, good understanding of condition, and realistic expectations.        Goals  STG to be met in 4 weeks:  - Increase Lumbar AROM: moderate restrictions all planes .  - Increase (B) LE strength by 1/2 MMT grade.  - I with HEP.  - Patient will be able to return to walking without SPC.   LTG to be met in 8 weeks:  - Increase Lumbar AROM: Minimal  restrictions all planes.  - Increase (B) LE strength to 4+/5 all planes.  - I with updated HEP.  - Patient will be able to return to walking over 1 mile for fishing and hunting.       Plan  Patient would benefit from: skilled physical therapy  Planned modality interventions: cryotherapy and thermotherapy: hydrocollator packs    Planned therapy interventions: activity modification, joint mobilization, manual therapy, neuromuscular re-education, patient education, postural training, strengthening, stretching, therapeutic exercise, therapeutic activities, functional ROM exercises, home exercise program, gait training and body mechanics training    Plan of Care beginning date: 2025  Plan of Care expiration date: 2025  Treatment plan discussed with: patient  Plan details: Prognosis above is given PT services 2x/week tapering to 1x/week over the next 8 weeks and home program adherence.           Subjective Evaluation    History of Present Illness  Mechanism of injury: At Evaluation (2025): Patient reports after Thanksgiving he was putting [place] lights up - noted increased (R) lateral hip pain and lower back pain.  He reports currently the central lower back is more painful.  He saw Dr. Santillan - recommended PT.      Red Flag Screen: Patient denies: fever, changes in bowel or bladder function, headache, dizziness, visual changes, nausea, vomiting, weakness, unexplained weight loss, numbness, tingling , pain with coughing/ sneezing, or traumatic ALYSON.    Greatest concern:  difficulty walking - balance is suffering because of back pain  Quality of life: good    Patient Goals  Patient goal: Patient Specific Functional Scale: return to walking with minimal assistive devices 0/10, return to hunting 0/10, return to fishing 0/10; Total 0/30  Pain  Current pain ratin  At best pain ratin  At worst pain ratin  Location: Central lower back - radiating out to both hips  Quality: dull ache and  sharp  Alleviating factors: Activity modification.  Exacerbated by: Walking, bending, sitting for extended time, dressing, lifting.    Social Support  Steps to enter house: yes  Stairs in house: yes   Lives in: multiple-level home  Lives with: spouse    Employment status: not working    Diagnostic Tests  X-ray: abnormal  Treatments  No previous or current treatments        Objective     Static Posture   General Observations  Asymmetrical weight bearing and shifted left.     Lumbar Spine   Flattened.     Postural Observations  Seated posture: fair  Standing posture: fair      Palpation     Additional Palpation Details  Minimal TTP throughout lumbar spine    Active Range of Motion     Lumbar   Flexion:  Restriction level: moderate  Extension:  Restriction level: minimal    Additional Active Range of Motion Details  (B) SG: moderate restriction    Strength/Myotome Testing     Left Hip   Planes of Motion   Flexion: 4+    Right Hip   Planes of Motion   Flexion: 4+    Left Knee   Flexion: 4+  Extension: 4+    Right Knee   Flexion: 4+  Extension: 4+    Left Ankle/Foot   Dorsiflexion: 4+  Plantar flexion: 4+  Great toe extension: 4+    Right Ankle/Foot   Dorsiflexion: 3+  Plantar flexion: 4+  Great toe extension: 4+    Tests     Lumbar   Negative SIJ compression and sacroiliac distraction.     Left   Positive passive SLR.   Negative crossed SLR and slump test.     Right   Positive passive SLR.   Negative crossed SLR and slump test.     Left Hip   Negative DERRICK and FADIR.     Right Hip   Positive DERRICK.   Negative FADIR.     Ambulation     Observational Gait   Gait: antalgic   Decreased walking speed, right stance time, left step length and right step length.             Daily Treatment Diary     DX: LBP with (R) sided sciatica   Follow Up with Referring Provider:   Precautions: Fall risk   CO-MORBIDITIES: Hx of neck fusion, (B) TKA, A-fib, CHF   HEP ACCESS CODE: [id number]     POC Expires Reeval for Medicare to be completed  Unit Limit Auth Expiration Date PT/OT/STVisit Limit   4/4/25 By visit NA  NA 12/31/25 BOMN    Completed on visit                  Stage: subacute   Etiology: overuse  Stability of Symptoms:  At Evaluation: Stable - unchanged  Global Rating of Change:   Symptom Irritability Level: Moderate  Primary Movement System Diagnosis: Hypomobility  Primary Pain Phenotype: Nociceptive  Patient Specific Functional Scale:   2/7/25: return to walking with minimal assistive devices 0/10, return to hunting 0/10, return to fishing 0/10; Total 0/30   Patient Acceptable Symptom State: unacceptable  FOTO Prediction: 55 by visit 11  FOTO Progress: 34 at evaluation   Greatest Concern: difficulty walking - balance is suffering because of back pain  Current Activity Plan: added to HEP (2/7)  Current Educational Needs: appropriate activity modifications, appropriate activity progressions, realistic expectations, timeframe for recovery, next steps to take to progress towards goals      Auth Status DATE 2/7        NA Visit # 1         Remaining         MANUAL THERAPY                                                               THERAPEUTIC EXERCISE HEP         Recumbent Bike                    Abdominal Brace 2/7         Supine March 2/7         H/L Hip ADD Isometric 2/7         BKFO 2/7                             Bridge                    LAQ                                                  NEUROMUSCULAR REEDUCATION           Standing Hip March          Standing Hip ABD          Standing Hip Ext                              Side stepping in // bars                    TB Trunk Rotation                                                                      THERAPEUTIC ACTIVITY                                                  GAIT TRAINING                                                  MODALITIES

## 2025-02-11 ENCOUNTER — OFFICE VISIT (OUTPATIENT)
Dept: PHYSICAL THERAPY | Facility: CLINIC | Age: 75
End: 2025-02-11
Payer: COMMERCIAL

## 2025-02-11 DIAGNOSIS — M54.41 ACUTE BILATERAL LOW BACK PAIN WITH RIGHT-SIDED SCIATICA: Primary | ICD-10-CM

## 2025-02-11 PROCEDURE — 97112 NEUROMUSCULAR REEDUCATION: CPT | Performed by: PHYSICAL THERAPIST

## 2025-02-11 PROCEDURE — 97110 THERAPEUTIC EXERCISES: CPT | Performed by: PHYSICAL THERAPIST

## 2025-02-11 NOTE — PROGRESS NOTES
Daily Note     Today's date: 2025  Patient name: Tian Garcia  : 1950  MRN: [id number]  Referring provider: Ronnie Thorpe, PT  Dx:   Encounter Diagnosis     ICD-10-CM    1. Acute bilateral low back pain with right-sided sciatica  M54.41                      Subjective:   Current Status: lower back is feeling a little better - continues to feel fatigued with activity  New Symptoms/ Problems: NA  Response to Last Treatment: no adverse reaction  HEP/ Activity Recommendations:  performing regularly  Progress Towards Goals: pain levels are improving - stamina and endurance continues to be limited     Objective: See treatment diary below      Assessment:   Stage: subacute   Etiology: overuse  Stability of Symptoms:  At Evaluation: Stable - unchanged  Global Rating of Change:   Symptom Irritability Level: Moderate  Primary Movement System Diagnosis: Hypomobility  Primary Pain Phenotype: Nociceptive  Patient Specific Functional Scale:   25: return to walking with minimal assistive devices 0/10, return to hunting 0/10, return to fishing 0/10; Total 0/30   Patient Acceptable Symptom State: unacceptable  FOTO Prediction: 55 by visit 11  FOTO Progress: 34 at evaluation   Greatest Concern: difficulty walking - balance is suffering because of back pain  Current Activity Plan: added to HEP ()  Current Educational Needs: appropriate activity modifications, appropriate activity progressions, realistic expectations, timeframe for recovery, next steps to take to progress towards goals    Patient had good tolerance to exercises - he was challenged and fatigued with both WB and mat-based exercises.  He noted no increased back pain following treatment.  Skilled PT continues to be required to guide progression of lumbar mobility and strength to allow him to return to walking longer distances for recreational activity.       Plan: Continue with POC.  Monitor tolerance to last treatment and activity recommendations.   "Follow up with me on 2/14/25.  Continue progressing control and strength with exercise repetitions.          Daily Treatment Diary     DX: LBP with (R) sided sciatica   Follow Up with Referring Provider:   Precautions: Fall risk   CO-MORBIDITIES: Hx of neck fusion, (B) TKA, A-fib, CHF   HEP ACCESS CODE: [id number]     POC Expires Reeval for Medicare to be completed Unit Limit Auth Expiration Date PT/OT/STVisit Limit   4/4/25 By visit NA  NA 12/31/25 BOMN    Completed on visit                  Auth Status DATE 2/11        NA Visit # 2         Remaining         MANUAL THERAPY                                                               THERAPEUTIC EXERCISE HEP         Recumbent Bike  6 min                  Abdominal Brace 2/7 5\"x10        Supine March 2/7 20x ea        H/L Hip ADD Isometric 2/7 5\"x10        BKFO 2/7 20x ea                            Bridge  5\"x10                  LAQ                                                  NEUROMUSCULAR REEDUCATION           Standing Hip March  20x ea        Standing Hip ABD  20x ea        Standing Hip Ext  20x ea                            Side stepping in // bars                    TB Trunk Rotation                                                                      THERAPEUTIC ACTIVITY                                                  GAIT TRAINING                                                  MODALITIES                                         "

## 2025-02-12 ENCOUNTER — RESULTS FOLLOW-UP (OUTPATIENT)
Dept: CARDIOLOGY CLINIC | Facility: CLINIC | Age: 75
End: 2025-02-12

## 2025-02-12 NOTE — TELEPHONE ENCOUNTER
Called patient, advise him of Zio monitor showing no significant arrhythmias, just single run of SVT for 7 beats.  Advised him that he is healing well after his ablation.    He feels that he has more energy.    He will continue to follow with us as previous scheduled in April with Dr. Amin.

## 2025-02-14 ENCOUNTER — OFFICE VISIT (OUTPATIENT)
Dept: PHYSICAL THERAPY | Facility: CLINIC | Age: 75
End: 2025-02-14
Payer: COMMERCIAL

## 2025-02-14 DIAGNOSIS — M54.41 ACUTE BILATERAL LOW BACK PAIN WITH RIGHT-SIDED SCIATICA: Primary | ICD-10-CM

## 2025-02-14 PROCEDURE — 97110 THERAPEUTIC EXERCISES: CPT | Performed by: PHYSICAL THERAPIST

## 2025-02-14 PROCEDURE — 97112 NEUROMUSCULAR REEDUCATION: CPT | Performed by: PHYSICAL THERAPIST

## 2025-02-14 NOTE — PROGRESS NOTES
Daily Note     Today's date: 2025  Patient name: Tian Garcia  : 1950  MRN: [id number]  Referring provider: Ronnie Thorpe, PT  Dx:   Encounter Diagnosis     ICD-10-CM    1. Acute bilateral low back pain with right-sided sciatica  M54.41                      Subjective:   Current Status: lower back is a little stiff today but overall improving  New Symptoms/ Problems: NA  Response to Last Treatment: no adverse reaction  HEP/ Activity Recommendations:  performing regularly  Progress Towards Goals: pain levels are improving - stamina and endurance continues to be limited - felt less stiffness following his LV    Objective: See treatment diary below      Assessment:   Stage: subacute   Etiology: overuse  Stability of Symptoms:  At Evaluation: Stable - unchanged  Global Rating of Change:   Symptom Irritability Level: Moderate  Primary Movement System Diagnosis: Hypomobility  Primary Pain Phenotype: Nociceptive  Patient Specific Functional Scale:   25: return to walking with minimal assistive devices 0/10, return to hunting 0/10, return to fishing 0/10; Total 0/30   Patient Acceptable Symptom State: unacceptable  FOTO Prediction: 55 by visit 11  FOTO Progress: 34 at evaluation   Greatest Concern: difficulty walking - balance is suffering because of back pain  Current Activity Plan: added to HEP ()  Current Educational Needs: appropriate activity modifications, appropriate activity progressions, realistic expectations, timeframe for recovery, next steps to take to progress towards goals    Patient had good tolerance to gentle lumbar mobilization - noted less stiffness following - no reproduction of leg symptoms. He was challenged with mat-based and WB exercises.  Side stepping was performed with some loss of balance.  He noted no increased pain post treatment.  Skilled PT continues to be required to guide progression of lumbar mobility and strength to allow him to return to walking longer distances  "for recreational activity.       Plan: Continue with POC.  Monitor tolerance to last treatment and activity recommendations.  Follow up with me on 2/17/25.  Continue progressing control and strength with exercise repetitions.          Daily Treatment Diary     DX: LBP with (R) sided sciatica   Follow Up with Referring Provider:   Precautions: Fall risk   CO-MORBIDITIES: Hx of neck fusion, (B) TKA, A-fib, CHF   HEP ACCESS CODE: [id number]     POC Expires Reeval for Medicare to be completed Unit Limit Auth Expiration Date PT/OT/STVisit Limit   4/4/25 By visit NA  NA 12/31/25 BOMN    Completed on visit                  Auth Status DATE 2/11 2/14       NA Visit # 2 3        Remaining         MANUAL THERAPY         Lumbar Mobs   S/L   Gr 3/4  4 min ea                                                    THERAPEUTIC EXERCISE HEP         Recumbent Bike  6 min 6 min                 Abdominal Brace 2/7 5\"x10 5\"x10       Supine March 2/7 20x ea 20x ea       H/L Hip ADD Isometric 2/7 5\"x10 5\"x20       BKFO 2/7 20x ea 20x ea                           Bridge  5\"x10 5\"x15                 LAQ                                                  NEUROMUSCULAR REEDUCATION           Standing Hip March  20x ea 20x ea       Standing Hip ABD  20x ea 20x ea       Standing Hip Ext  20x ea 20x ea                           Side stepping in // bars   Foam  6 feet  3 laps                 TB Trunk Rotation                                                                      THERAPEUTIC ACTIVITY                                                  GAIT TRAINING                                                  MODALITIES                                         "

## 2025-02-17 ENCOUNTER — APPOINTMENT (OUTPATIENT)
Dept: PHYSICAL THERAPY | Facility: CLINIC | Age: 75
End: 2025-02-17
Payer: COMMERCIAL

## 2025-02-18 ENCOUNTER — OFFICE VISIT (OUTPATIENT)
Dept: PHYSICAL THERAPY | Facility: CLINIC | Age: 75
End: 2025-02-18
Payer: COMMERCIAL

## 2025-02-18 DIAGNOSIS — M54.41 ACUTE BILATERAL LOW BACK PAIN WITH RIGHT-SIDED SCIATICA: Primary | ICD-10-CM

## 2025-02-18 PROCEDURE — 97140 MANUAL THERAPY 1/> REGIONS: CPT | Performed by: PHYSICAL THERAPIST

## 2025-02-18 PROCEDURE — 97110 THERAPEUTIC EXERCISES: CPT | Performed by: PHYSICAL THERAPIST

## 2025-02-18 PROCEDURE — 97112 NEUROMUSCULAR REEDUCATION: CPT | Performed by: PHYSICAL THERAPIST

## 2025-02-18 NOTE — PROGRESS NOTES
Daily Note     Today's date: 2025  Patient name: Tian Garcia  : 1950  MRN: [id number]  Referring provider: Ronnie Thorpe, PT  Dx:   Encounter Diagnosis     ICD-10-CM    1. Acute bilateral low back pain with right-sided sciatica  M54.41                      Subjective:   Current Status: lower back is feeling a lot better - (L) distal quadriceps is painful and there is a palpable lump  New Symptoms/ Problems: NA  Response to Last Treatment: no adverse reaction  HEP/ Activity Recommendations:  performing regularly  Progress Towards Goals: pain levels are improving - stamina and endurance continues to be limited - felt less stiffness following his LV    Objective: See treatment diary below      Assessment:   Stage: subacute   Etiology: overuse  Stability of Symptoms:  At Evaluation: Stable - unchanged  Global Rating of Change:   Symptom Irritability Level: Moderate  Primary Movement System Diagnosis: Hypomobility  Primary Pain Phenotype: Nociceptive  Patient Specific Functional Scale:   25: return to walking with minimal assistive devices 0/10, return to hunting 0/10, return to fishing 0/10; Total 0/30   Patient Acceptable Symptom State: unacceptable  FOTO Prediction: 55 by visit 11  FOTO Progress: 34 at evaluation   Greatest Concern: difficulty walking - balance is suffering because of back pain  Current Activity Plan: added to HEP ()  Current Educational Needs: appropriate activity modifications, appropriate activity progressions, realistic expectations, timeframe for recovery, next steps to take to progress towards goals    Patient continues to have good tolerance to manual treatment - lumbar mobilization improved mobility, quad STM improved knee pain levels.  He noted being challenged with progressions with standing hip strength.  He is progressing well with PT.  Skilled PT continues to be required to guide progression of lumbar mobility and strength to allow him to return to walking longer  "distances for recreational activity.       Plan: Continue with POC.  Monitor tolerance to last treatment and activity recommendations.  Follow up with me on 2/20/25.  Continue progressing control and strength with exercise repetitions.          Daily Treatment Diary     DX: LBP with (R) sided sciatica   Follow Up with Referring Provider:   Precautions: Fall risk   CO-MORBIDITIES: Hx of neck fusion, (B) TKA, A-fib, CHF   HEP ACCESS CODE: [id number]     POC Expires Reeval for Medicare to be completed Unit Limit Auth Expiration Date PT/OT/STVisit Limit   4/4/25 By visit NA  NA 12/31/25 BOMN    Completed on visit                  Auth Status DATE 2/11 2/14 2/18      NA Visit # 2 3 4       Remaining         MANUAL THERAPY         Lumbar Mobs   S/L   Gr 3/4  4 min ea S/L   Gr 3/4  4 min ea      (L) Knee Distal Quad STM   2 min                                          THERAPEUTIC EXERCISE HEP         Recumbent Bike  6 min 6 min 6 min                Abdominal Brace 2/7 5\"x10 5\"x10 5\"x10      Supine March 2/7 20x ea 20x ea 20x ea      H/L Hip ADD Isometric 2/7 5\"x10 5\"x20 5\"x20      BKFO 2/7 20x ea 20x ea 20x ea                          Bridge  5\"x10 5\"x15 5\"x20                LAQ                                                  NEUROMUSCULAR REEDUCATION           Standing Hip March  20x ea 20x ea 1.5# 20x ea      Standing Hip ABD  20x ea 20x ea 1.5# 20x ea      Standing Hip Ext  20x ea 20x ea 1.5# 20x ea                          Side stepping in // bars   Foam  6 feet  3 laps Foam  6 feet  5 laps                TB Trunk Rotation                                                                      THERAPEUTIC ACTIVITY                                                  GAIT TRAINING                                                  MODALITIES                                         "

## 2025-02-20 ENCOUNTER — OFFICE VISIT (OUTPATIENT)
Dept: PHYSICAL THERAPY | Facility: CLINIC | Age: 75
End: 2025-02-20
Payer: COMMERCIAL

## 2025-02-20 DIAGNOSIS — M54.41 ACUTE BILATERAL LOW BACK PAIN WITH RIGHT-SIDED SCIATICA: Primary | ICD-10-CM

## 2025-02-20 PROCEDURE — 97112 NEUROMUSCULAR REEDUCATION: CPT | Performed by: PHYSICAL THERAPIST

## 2025-02-20 PROCEDURE — 97110 THERAPEUTIC EXERCISES: CPT | Performed by: PHYSICAL THERAPIST

## 2025-02-20 NOTE — PROGRESS NOTES
Daily Note     Today's date: 2025  Patient name: Tian Garcia  : 1950  MRN: [id number]  Referring provider: Ronnie Thorpe, PT  Dx:   Encounter Diagnosis     ICD-10-CM    1. Acute bilateral low back pain with right-sided sciatica  M54.41                      Subjective:   Current Status: lower back is feeling a little sore  New Symptoms/ Problems: NA  Response to Last Treatment: no adverse reaction  HEP/ Activity Recommendations:  performing regularly  Progress Towards Goals: pain levels are improving - able to go grocery shopping and carry all items up steps into house.      Objective: See treatment diary below      Assessment:   Stage: subacute   Etiology: overuse  Stability of Symptoms:  At Evaluation: Stable - unchanged  Global Rating of Change:   Symptom Irritability Level: Moderate  Primary Movement System Diagnosis: Hypomobility  Primary Pain Phenotype: Nociceptive  Patient Specific Functional Scale:   25: return to walking with minimal assistive devices 0/10, return to hunting 0/10, return to fishing 0/10; Total 0/30   Patient Acceptable Symptom State: unacceptable  FOTO Prediction: 55 by visit 11  FOTO Progress: 34 at evaluation   Greatest Concern: difficulty walking - balance is suffering because of back pain  Current Activity Plan: added to HEP ()  Current Educational Needs: appropriate activity modifications, appropriate activity progressions, realistic expectations, timeframe for recovery, next steps to take to progress towards goals    Patient continues to respond well to manual treatment.  He had good form with exercises.  He noted fatigue but no increased pain post treatment.  Skilled PT continues to be required to guide progression of lumbar mobility and strength to allow him to return to walking longer distances for recreational activity.       Plan: Continue with POC.  Monitor tolerance to last treatment and activity recommendations.  Follow up with me on 25.  Continue  "progressing control and strength with exercise repetitions.          Daily Treatment Diary     DX: LBP with (R) sided sciatica   Follow Up with Referring Provider:   Precautions: Fall risk   CO-MORBIDITIES: Hx of neck fusion, (B) TKA, A-fib, CHF   HEP ACCESS CODE: [id number]     POC Expires Reeval for Medicare to be completed Unit Limit Auth Expiration Date PT/OT/STVisit Limit   4/4/25 By visit NA  NA 12/31/25 BOMN    Completed on visit                  Auth Status DATE 2/11 2/14 2/18 2/20     NA Visit # 2 3 4       Remaining         MANUAL THERAPY         Lumbar Mobs   S/L   Gr 3/4  4 min ea S/L   Gr 3/4  4 min ea S/L   Gr 3/4  4 min  ea     (L) Knee Distal Quad STM   2 min                                          THERAPEUTIC EXERCISE HEP         Recumbent Bike  6 min 6 min 6 min 6 min               Abdominal Brace 2/7 5\"x10 5\"x10 5\"x10 5\"x10     Supine March 2/7 20x ea 20x ea 20x ea 20x ea     H/L Hip ADD Isometric 2/7 5\"x10 5\"x20 5\"x20 5\"x20     BKFO 2/7 20x ea 20x ea 20x ea 20x ea                         Bridge  5\"x10 5\"x15 5\"x20 5\"x20               LAQ                                                  NEUROMUSCULAR REEDUCATION           Standing Hip March  20x ea 20x ea 1.5# 20x ea      Standing Hip ABD  20x ea 20x ea 1.5# 20x ea      Standing Hip Ext  20x ea 20x ea 1.5# 20x ea                          Side stepping in // bars   Foam  6 feet  3 laps Foam  6 feet  5 laps                TB Trunk Rotation                                                                      THERAPEUTIC ACTIVITY                                                  GAIT TRAINING                                                  MODALITIES                                         "

## 2025-02-24 ENCOUNTER — OFFICE VISIT (OUTPATIENT)
Dept: PHYSICAL THERAPY | Facility: CLINIC | Age: 75
End: 2025-02-24
Payer: COMMERCIAL

## 2025-02-24 DIAGNOSIS — M54.41 ACUTE BILATERAL LOW BACK PAIN WITH RIGHT-SIDED SCIATICA: Primary | ICD-10-CM

## 2025-02-24 PROCEDURE — 97110 THERAPEUTIC EXERCISES: CPT | Performed by: PHYSICAL THERAPIST

## 2025-02-24 PROCEDURE — 97112 NEUROMUSCULAR REEDUCATION: CPT | Performed by: PHYSICAL THERAPIST

## 2025-02-24 NOTE — PROGRESS NOTES
Daily Note     Today's date: 2025  Patient name: Tian Garcia  : 1950  MRN: [id number]  Referring provider: Ronnie Thorpe, PT  Dx:   Encounter Diagnosis     ICD-10-CM    1. Acute bilateral low back pain with right-sided sciatica  M54.41                      Subjective:   Current Status: lower back is feeling good - knee continues to be painful  New Symptoms/ Problems: NA  Response to Last Treatment: no adverse reaction  HEP/ Activity Recommendations:  performing regularly  Progress Towards Goals: pain levels are improving - able to go grocery shopping and carry all items up steps into house.      Objective: See treatment diary below      Assessment:   Stage: subacute   Etiology: overuse  Stability of Symptoms:  At Evaluation: Stable - unchanged  Global Rating of Change:   Symptom Irritability Level: Moderate  Primary Movement System Diagnosis: Hypomobility  Primary Pain Phenotype: Nociceptive  Patient Specific Functional Scale:   25: return to walking with minimal assistive devices 0/10, return to hunting 0/10, return to fishing 0/10; Total 0/30   Patient Acceptable Symptom State: unacceptable  FOTO Prediction: 55 by visit 11  FOTO Progress: 34 at evaluation   Greatest Concern: difficulty walking - balance is suffering because of back pain  Current Activity Plan: added to HEP ()  Current Educational Needs: appropriate activity modifications, appropriate activity progressions, realistic expectations, timeframe for recovery, next steps to take to progress towards goals    Patient continues to respond well to manual stretching.  He continues to be challenged with current level of exercise - noted fatigue but no increased pain.  Knee is limiting him more than lumbar spine at the moment.  Skilled PT continues to be required to guide progression of lumbar mobility and strength to allow him to return to walking longer distances for recreational activity.       Plan: Continue with POC.  Monitor  "tolerance to last treatment and activity recommendations.  Follow up with me on 2/27/25.  Continue progressing control and strength with exercise repetitions.          Daily Treatment Diary     DX: LBP with (R) sided sciatica   Follow Up with Referring Provider:   Precautions: Fall risk   CO-MORBIDITIES: Hx of neck fusion, (B) TKA, A-fib, CHF   HEP ACCESS CODE: [id number]     POC Expires Reeval for Medicare to be completed Unit Limit Auth Expiration Date PT/OT/STVisit Limit   4/4/25 By visit NA  NA 12/31/25 BOMN    Completed on visit                  Auth Status DATE 2/18 2/20 2/24      NA Visit # 4 5 6       Remaining         MANUAL THERAPY         Lumbar Mobs  S/L   Gr 3/4  4 min ea S/L   Gr 3/4  4 min  ea S/L   Gr 3/4  4 min ea      (L) Knee Distal Quad STM 2 min                                            THERAPEUTIC EXERCISE HEP         Recumbent Bike  6 min 6 min 6 min                Abdominal Brace 2/7 5\"x10 5\"x10 5\"x10      Supine March 2/7 20x ea 20x ea 20x ea      H/L Hip ADD Isometric 2/7 5\"x20 5\"x20       BKFO 2/7 20x ea 20x ea                           Bridge  5\"x20 5\"x20 5\"x20                LAQ                                                  NEUROMUSCULAR REEDUCATION           Standing Hip March  1.5# 20x ea  1.5# 20x ea      Standing Hip ABD  1.5# 20x ea  1.5# 20x ea      Standing Hip Ext  1.5# 20x ea  1.5# 20x ea                          Side stepping in // bars  Foam  6 feet  5 laps  Foam  6 feet  5 laps                 TB Trunk Rotation                                                                      THERAPEUTIC ACTIVITY                                                  GAIT TRAINING                                                  MODALITIES                                         "

## 2025-02-25 DIAGNOSIS — G47.00 INSOMNIA, UNSPECIFIED TYPE: ICD-10-CM

## 2025-02-25 NOTE — TELEPHONE ENCOUNTER
Reason for call:   [x] Refill   [] Prior Auth  [] Other:     Office:   [x] PCP/Provider - Fly,Aneta  [] Specialty/Provider -     Medication: Zolpidem    Dose/Frequency: 10 mg HS PRN    Quantity: 90    Pharmacy: [facility]     Does the patient have enough for 3 days?   [x] Yes   [] No - Send as HP to POD

## 2025-02-26 RX ORDER — ZOLPIDEM TARTRATE 10 MG/1
10 TABLET ORAL
Qty: 90 TABLET | Refills: 0 | Status: SHIPPED | OUTPATIENT
Start: 2025-02-26

## 2025-02-27 ENCOUNTER — APPOINTMENT (OUTPATIENT)
Dept: PHYSICAL THERAPY | Facility: CLINIC | Age: 75
End: 2025-02-27
Payer: COMMERCIAL

## 2025-03-03 ENCOUNTER — OFFICE VISIT (OUTPATIENT)
Dept: PHYSICAL THERAPY | Facility: CLINIC | Age: 75
End: 2025-03-03
Payer: COMMERCIAL

## 2025-03-03 DIAGNOSIS — M54.41 ACUTE BILATERAL LOW BACK PAIN WITH RIGHT-SIDED SCIATICA: Primary | ICD-10-CM

## 2025-03-03 PROCEDURE — 97110 THERAPEUTIC EXERCISES: CPT | Performed by: PHYSICAL THERAPIST

## 2025-03-03 PROCEDURE — 97112 NEUROMUSCULAR REEDUCATION: CPT | Performed by: PHYSICAL THERAPIST

## 2025-03-03 NOTE — PROGRESS NOTES
Daily Note     Today's date: 3/3/2025  Patient name: Tian Garcia  : 1950  MRN: [id number]  Referring provider: Ronnie Thorpe, PT  Dx:   Encounter Diagnosis     ICD-10-CM    1. Acute bilateral low back pain with right-sided sciatica  M54.41                      Subjective:   Current Status: lower back is feeling good  - (R) shoulder is more limiting than anything at this time  New Symptoms/ Problems: NA  Response to Last Treatment: no adverse reaction  HEP/ Activity Recommendations:  performing regularly  Progress Towards Goals: pain levels are improving - able to go grocery shopping and carry all items up steps into house.      Objective: See treatment diary below      Assessment:   Stage: subacute   Etiology: overuse  Stability of Symptoms:  At Evaluation: Stable - unchanged  Global Rating of Change:   Symptom Irritability Level: Moderate  Primary Movement System Diagnosis: Hypomobility  Primary Pain Phenotype: Nociceptive  Patient Specific Functional Scale:   25: return to walking with minimal assistive devices 0/10, return to hunting 0/10, return to fishing 0/10; Total 0/30   Patient Acceptable Symptom State: unacceptable  FOTO Prediction: 55 by visit 11  FOTO Progress: 34 at evaluation   Greatest Concern: difficulty walking - balance is suffering because of back pain  Current Activity Plan: added to HEP ()  Current Educational Needs: appropriate activity modifications, appropriate activity progressions, realistic expectations, timeframe for recovery, next steps to take to progress towards goals    Patient had good tolerance to manual treatment - noted less pain in the back following.  He performed all exercises with good form and recall.  At this time it is recommended that he continue with an I HEP - he is to contact me if he has return of symptoms or any questions/ concerns.      Plan: DC to I HEP - evaluate (R) shoulder NV         Daily Treatment Diary     DX: LBP with (R) sided sciatica  "  Follow Up with Referring Provider:   Precautions: Fall risk   CO-MORBIDITIES: Hx of neck fusion, (B) TKA, A-fib, CHF   HEP ACCESS CODE: [id number]     POC Expires Reeval for Medicare to be completed Unit Limit Auth Expiration Date PT/OT/STVisit Limit   4/4/25 By visit NA  NA 12/31/25 BOMN    Completed on visit                  Auth Status DATE 2/18 2/20 2/24 3/3     NA Visit # 4 5 6 7      Remaining         MANUAL THERAPY         Lumbar Mobs  S/L   Gr 3/4  4 min ea S/L   Gr 3/4  4 min  ea S/L   Gr 3/4  4 min ea S/L   Gr 3/4  4 min ea     (L) Knee Distal Quad STM 2 min                                            THERAPEUTIC EXERCISE HEP         Recumbent Bike  6 min 6 min 6 min 6 min               Abdominal Brace 2/7 5\"x10 5\"x10 5\"x10 5\"x15     Supine March 2/7 20x ea 20x ea 20x ea 20x ea     H/L Hip ADD Isometric 2/7 5\"x20 5\"x20       BKFO 2/7 20x ea 20x ea                           Bridge  5\"x20 5\"x20 5\"x20 5\"x20               LAQ                                                  NEUROMUSCULAR REEDUCATION           Standing Hip March  1.5# 20x ea  1.5# 20x ea 1.5# 20x ea     Standing Hip ABD  1.5# 20x ea  1.5# 20x ea 1.5# 20x ea     Standing Hip Ext  1.5# 20x ea  1.5# 20x ea 1.5# 20x ea                         Side stepping in // bars  Foam  6 feet  5 laps  Foam  6 feet  5 laps  Foam  6 feet  5 laps               TB Trunk Rotation                                                                      THERAPEUTIC ACTIVITY                                                  GAIT TRAINING                                                  MODALITIES                                         "

## 2025-03-04 ENCOUNTER — HOSPITAL ENCOUNTER (OUTPATIENT)
Dept: NON INVASIVE DIAGNOSTICS | Age: 75
Discharge: HOME | End: 2025-03-04
Payer: COMMERCIAL

## 2025-03-04 VITALS
BODY MASS INDEX: 33.32 KG/M2 | WEIGHT: 238 LBS | HEART RATE: 56 BPM | DIASTOLIC BLOOD PRESSURE: 68 MMHG | SYSTOLIC BLOOD PRESSURE: 110 MMHG | HEIGHT: 71 IN

## 2025-03-04 DIAGNOSIS — I35.0 NONRHEUMATIC AORTIC VALVE STENOSIS: ICD-10-CM

## 2025-03-04 DIAGNOSIS — I48.0 PAROXYSMAL A-FIB (HCC): ICD-10-CM

## 2025-03-04 LAB
AORTIC ROOT: 3.7 CM
AORTIC VALVE MEAN VELOCITY: 21.5 M/S
AV AREA BY CONTINUOUS VTI: 1.1 CM2
AV AREA PEAK VELOCITY: 1 CM2
AV LVOT MEAN GRADIENT: 2 MMHG
AV LVOT PEAK GRADIENT: 4 MMHG
AV MEAN PRESS GRAD SYS DOP V1V2: 21 MMHG
AV ORIFICE AREA US: 1.13 CM2
AV PEAK GRADIENT: 40 MMHG
AV VELOCITY RATIO: 0.33
AV VMAX SYS DOP: 3.18 M/S
AVA (PLAN): 1.1 CM2
BSA FOR ECHO PROCEDURE: 2.27 M2
DOP CALC AO VTI: 71.62 CM
DOP CALC LVOT AREA: 3.46 CM2
DOP CALC LVOT CARDIAC INDEX: 1.93 L/MIN/M2
DOP CALC LVOT CARDIAC OUTPUT: 4.39 L/MIN
DOP CALC LVOT DIAMETER: 2.1 CM
DOP CALC LVOT PEAK VEL VTI: 23.28 CM
DOP CALC LVOT PEAK VEL: 0.95 M/S
DOP CALC LVOT STROKE INDEX: 35.7 ML/M2
DOP CALC LVOT STROKE VOLUME: 80.59
DOP CALC MV VTI: 30.67 CM
E WAVE DECELERATION TIME: 133 MS
E/A RATIO: 1.81
FRACTIONAL SHORTENING: 27 (ref 28–44)
INTERVENTRICULAR SEPTUM IN DIASTOLE (PARASTERNAL SHORT AXIS VIEW): 1 CM
INTERVENTRICULAR SEPTUM: 1 CM (ref 0.6–1.1)
LAAS-AP2: 32.5 CM2
LAAS-AP4: 31.1 CM2
LEFT ATRIUM SIZE: 4.8 CM
LEFT ATRIUM VOLUME (MOD BIPLANE): 124 ML
LEFT ATRIUM VOLUME INDEX (MOD BIPLANE): 54.6 ML/M2
LEFT INTERNAL DIMENSION IN SYSTOLE: 4.3 CM (ref 2.1–4)
LEFT VENTRICLE DIASTOLIC VOLUME (MOD BIPLANE): 176 ML
LEFT VENTRICLE DIASTOLIC VOLUME INDEX (MOD BIPLANE): 77.5 ML/M2
LEFT VENTRICLE SYSTOLIC VOLUME (MOD BIPLANE): 76 ML
LEFT VENTRICLE SYSTOLIC VOLUME INDEX (MOD BIPLANE): 33.5 ML/M2
LEFT VENTRICULAR INTERNAL DIMENSION IN DIASTOLE: 5.9 CM (ref 3.5–6)
LEFT VENTRICULAR POSTERIOR WALL IN END DIASTOLE: 0.8 CM
LEFT VENTRICULAR STROKE VOLUME: 90 ML
LV EF BIPLANE MOD: 57 %
LV EF US.2D.A4C+ESTIMATED: 57 %
LVSV (TEICH): 90 ML
MV E'TISSUE VEL-LAT: 12 CM/S
MV E'TISSUE VEL-SEP: 6 CM/S
MV MEAN GRADIENT: 1 MMHG
MV PEAK A VEL: 0.59 M/S
MV PEAK E VEL: 107 CM/S
MV PEAK GRADIENT: 6 MMHG
MV STENOSIS PRESSURE HALF TIME: 39 MS
MV VALVE AREA BY CONTINUITY EQUATION: 2.63 CM2
MV VALVE AREA P 1/2 METHOD: 5.64
RIGHT ATRIAL 2D VOLUME: 64 ML
RIGHT ATRIUM AREA SYSTOLE A4C: 21.8 CM2
RIGHT VENTRICLE ID DIMENSION: 5 CM
SL CV LEFT ATRIUM LENGTH A2C: 6.6 CM
SL CV LV EF: 55
SL CV PED ECHO LEFT VENTRICLE DIASTOLIC VOLUME (MOD BIPLANE) 2D: 172 ML
SL CV PED ECHO LEFT VENTRICLE SYSTOLIC VOLUME (MOD BIPLANE) 2D: 82 ML

## 2025-03-04 PROCEDURE — 93308 TTE F-UP OR LMTD: CPT

## 2025-03-04 PROCEDURE — 93321 DOPPLER ECHO F-UP/LMTD STD: CPT | Performed by: INTERNAL MEDICINE

## 2025-03-04 PROCEDURE — 93321 DOPPLER ECHO F-UP/LMTD STD: CPT

## 2025-03-04 PROCEDURE — 93325 DOPPLER ECHO COLOR FLOW MAPG: CPT

## 2025-03-04 PROCEDURE — 93325 DOPPLER ECHO COLOR FLOW MAPG: CPT | Performed by: INTERNAL MEDICINE

## 2025-03-04 PROCEDURE — 93308 TTE F-UP OR LMTD: CPT | Performed by: INTERNAL MEDICINE

## 2025-03-06 ENCOUNTER — OFFICE VISIT (OUTPATIENT)
Dept: FAMILY MEDICINE CLINIC | Facility: HOSPITAL | Age: 75
End: 2025-03-06
Payer: COMMERCIAL

## 2025-03-06 ENCOUNTER — RESULTS FOLLOW-UP (OUTPATIENT)
Dept: CARDIOLOGY CLINIC | Facility: CLINIC | Age: 75
End: 2025-03-06

## 2025-03-06 ENCOUNTER — OFFICE VISIT (OUTPATIENT)
Dept: PHYSICAL THERAPY | Facility: CLINIC | Age: 75
End: 2025-03-06
Payer: COMMERCIAL

## 2025-03-06 VITALS
BODY MASS INDEX: 34.3 KG/M2 | SYSTOLIC BLOOD PRESSURE: 114 MMHG | HEIGHT: 71 IN | OXYGEN SATURATION: 94 % | DIASTOLIC BLOOD PRESSURE: 60 MMHG | HEART RATE: 84 BPM | WEIGHT: 245 LBS

## 2025-03-06 DIAGNOSIS — I25.10 ATHEROSCLEROSIS OF NATIVE CORONARY ARTERY OF NATIVE HEART WITHOUT ANGINA PECTORIS: ICD-10-CM

## 2025-03-06 DIAGNOSIS — I35.0 MODERATE AORTIC STENOSIS BY PRIOR ECHOCARDIOGRAM: ICD-10-CM

## 2025-03-06 DIAGNOSIS — Z99.89 USE OF CANE AS AMBULATORY AID: ICD-10-CM

## 2025-03-06 DIAGNOSIS — N18.32 STAGE 3B CHRONIC KIDNEY DISEASE (CKD) (HCC): ICD-10-CM

## 2025-03-06 DIAGNOSIS — Z00.00 MEDICARE ANNUAL WELLNESS VISIT, SUBSEQUENT: Primary | ICD-10-CM

## 2025-03-06 DIAGNOSIS — M54.2 CERVICAL SPINE PAIN: ICD-10-CM

## 2025-03-06 DIAGNOSIS — M79.652 MUSCULOSKELETAL THIGH PAIN, LEFT: ICD-10-CM

## 2025-03-06 DIAGNOSIS — G89.29 CHRONIC RIGHT SHOULDER PAIN: Primary | ICD-10-CM

## 2025-03-06 DIAGNOSIS — M25.511 CHRONIC RIGHT SHOULDER PAIN: Primary | ICD-10-CM

## 2025-03-06 PROCEDURE — G0439 PPPS, SUBSEQ VISIT: HCPCS | Performed by: INTERNAL MEDICINE

## 2025-03-06 PROCEDURE — 97162 PT EVAL MOD COMPLEX 30 MIN: CPT | Performed by: PHYSICAL THERAPIST

## 2025-03-06 PROCEDURE — 97110 THERAPEUTIC EXERCISES: CPT | Performed by: PHYSICAL THERAPIST

## 2025-03-06 NOTE — PATIENT INSTRUCTIONS
Medicare Preventive Visit Patient Instructions  Thank you for completing your Welcome to Medicare Visit or Medicare Annual Wellness Visit today. Your next wellness visit will be due in one year (3/7/2026).  The screening/preventive services that you may require over the next 5-10 years are detailed below. Some tests may not apply to you based off risk factors and/or age. Screening tests ordered at today's visit but not completed yet may show as past due. Also, please note that scanned in results may not display below.  Preventive Screenings:  Service Recommendations Previous Testing/Comments   Colorectal Cancer Screening  Colonoscopy    Fecal Occult Blood Test (FOBT)/Fecal Immunochemical Test (FIT)  Fecal DNA/Cologuard Test  Flexible Sigmoidoscopy Age: 45-75 years old   Colonoscopy: every 10 years (May be performed more frequently if at higher risk)  OR  FOBT/FIT: every 1 year  OR  Cologuard: every 3 years  OR  Sigmoidoscopy: every 5 years  Screening may be recommended earlier than age 45 if at higher risk for colorectal cancer. Also, an individualized decision between you and your healthcare provider will decide whether screening between the ages of 76-85 would be appropriate. Colonoscopy: 02/06/2012  FOBT/FIT: 05/30/2022  Cologuard: 06/27/2023  Sigmoidoscopy: Not on file          Prostate Cancer Screening Individualized decision between patient and health care provider in men between ages of 55-69   Medicare will cover every 12 months beginning on the day after your 50th birthday PSA: 1.2 ng/mL           Hepatitis C Screening Once for adults born between 1945 and 1965  More frequently in patients at high risk for Hepatitis C Hep C Antibody: 09/27/2018    Screening Current   Diabetes Screening 1-2 times per year if you're at risk for diabetes or have pre-diabetes Fasting glucose: 103 mg/dL (1/3/2025)  A1C: 6.2 % (4/9/2023)  Screening Current   Cholesterol Screening Once every 5 years if you don't have a lipid  disorder. May order more often based on risk factors. Lipid panel: 11/12/2024  Screening Not Indicated  History Lipid Disorder      Other Preventive Screenings Covered by Medicare:  Abdominal Aortic Aneurysm (AAA) Screening: covered once if your at risk. You're considered to be at risk if you have a family history of AAA or a male between the age of 65-75 who smoking at least 100 cigarettes in your lifetime.  Lung Cancer Screening: covers low dose CT scan once per year if you meet all of the following conditions: (1) Age 55-77; (2) No signs or symptoms of lung cancer; (3) Current smoker or have quit smoking within the last 15 years; (4) You have a tobacco smoking history of at least 20 pack years (packs per day x number of years you smoked); (5) You get a written order from a healthcare provider.  Glaucoma Screening: covered annually if you're considered high risk: (1) You have diabetes OR (2) Family history of glaucoma OR (3)  aged 50 and older OR (4)  American aged 65 and older  Osteoporosis Screening: covered every 2 years if you meet one of the following conditions: (1) Have a vertebral abnormality; (2) On glucocorticoid therapy for more than 3 months; (3) Have primary hyperparathyroidism; (4) On osteoporosis medications and need to assess response to drug therapy.  HIV Screening: covered annually if you're between the age of 15-65. Also covered annually if you are younger than 15 and older than 65 with risk factors for HIV infection. For pregnant patients, it is covered up to 3 times per pregnancy.    Immunizations:  Immunization Recommendations   Influenza Vaccine Annual influenza vaccination during flu season is recommended for all persons aged >= 6 months who do not have contraindications   Pneumococcal Vaccine   * Pneumococcal conjugate vaccine = PCV13 (Prevnar 13), PCV15 (Vaxneuvance), PCV20 (Prevnar 20)  * Pneumococcal polysaccharide vaccine = PPSV23 (Pneumovax) Adults 19-63 yo  with certain risk factors or if 65+ yo  If never received any pneumonia vaccine: recommend Prevnar 20 (PCV20)  Give PCV20 if previously received 1 dose of PCV13 or PPSV23   Hepatitis B Vaccine 3 dose series if at intermediate or high risk (ex: diabetes, end stage renal disease, liver disease)   Respiratory syncytial virus (RSV) Vaccine - COVERED BY MEDICARE PART D  * RSVPreF3 (Arexvy) CDC recommends that adults 60 years of age and older may receive a single dose of RSV vaccine using shared clinical decision-making (SCDM)   Tetanus (Td) Vaccine - COST NOT COVERED BY MEDICARE PART B Following completion of primary series, a booster dose should be given every 10 years to maintain immunity against tetanus. Td may also be given as tetanus wound prophylaxis.   Tdap Vaccine - COST NOT COVERED BY MEDICARE PART B Recommended at least once for all adults. For pregnant patients, recommended with each pregnancy.   Shingles Vaccine (Shingrix) - COST NOT COVERED BY MEDICARE PART B  2 shot series recommended in those 19 years and older who have or will have weakened immune systems or those 50 years and older     Health Maintenance Due:      Topic Date Due   • Colorectal Cancer Screening  06/27/2026   • Hepatitis C Screening  Completed     Immunizations Due:  There are no preventive care reminders to display for this patient.  Advance Directives   What are advance directives?  Advance directives are legal documents that state your wishes and plans for medical care. These plans are made ahead of time in case you lose your ability to make decisions for yourself. Advance directives can apply to any medical decision, such as the treatments you want, and if you want to donate organs.   What are the types of advance directives?  There are many types of advance directives, and each state has rules about how to use them. You may choose a combination of any of the following:  Living will:  This is a written record of the treatment you want.  You can also choose which treatments you do not want, which to limit, and which to stop at a certain time. This includes surgery, medicine, IV fluid, and tube feedings.   Durable power of  for healthcare (DPAHC):  This is a written record that states who you want to make healthcare choices for you when you are unable to make them for yourself. This person, called a proxy, is usually a family member or a friend. You may choose more than 1 proxy.  Do not resuscitate (DNR) order:  A DNR order is used in case your heart stops beating or you stop breathing. It is a request not to have certain forms of treatment, such as CPR. A DNR order may be included in other types of advance directives.  Medical directive:  This covers the care that you want if you are in a coma, near death, or unable to make decisions for yourself. You can list the treatments you want for each condition. Treatment may include pain medicine, surgery, blood transfusions, dialysis, IV or tube feedings, and a ventilator (breathing machine).  Values history:  This document has questions about your views, beliefs, and how you feel and think about life. This information can help others choose the care that you would choose.  Why are advance directives important?  An advance directive helps you control your care. Although spoken wishes may be used, it is better to have your wishes written down. Spoken wishes can be misunderstood, or not followed. Treatments may be given even if you do not want them. An advance directive may make it easier for your family to make difficult choices about your care.   Weight Management   Why it is important to manage your weight:  Being overweight increases your risk of health conditions such as heart disease, high blood pressure, type 2 diabetes, and certain types of cancer. It can also increase your risk for osteoarthritis, sleep apnea, and other respiratory problems. Aim for a slow, steady weight loss. Even a small  amount of weight loss can lower your risk of health problems.  How to lose weight safely:  A safe and healthy way to lose weight is to eat fewer calories and get regular exercise. You can lose up about 1 pound a week by decreasing the number of calories you eat by 500 calories each day.   Healthy meal plan for weight management:  A healthy meal plan includes a variety of foods, contains fewer calories, and helps you stay healthy. A healthy meal plan includes the following:  Eat whole-grain foods more often.  A healthy meal plan should contain fiber. Fiber is the part of grains, fruits, and vegetables that is not broken down by your body. Whole-grain foods are healthy and provide extra fiber in your diet. Some examples of whole-grain foods are whole-wheat breads and pastas, oatmeal, brown rice, and bulgur.  Eat a variety of vegetables every day.  Include dark, leafy greens such as spinach, kale, jens greens, and mustard greens. Eat yellow and orange vegetables such as carrots, sweet potatoes, and winter squash.   Eat a variety of fruits every day.  Choose fresh or canned fruit (canned in its own juice or light syrup) instead of juice. Fruit juice has very little or no fiber.  Eat low-fat dairy foods.  Drink fat-free (skim) milk or 1% milk. Eat fat-free yogurt and low-fat cottage cheese. Try low-fat cheeses such as mozzarella and other reduced-fat cheeses.  Choose meat and other protein foods that are low in fat.  Choose beans or other legumes such as split peas or lentils. Choose fish, skinless poultry (chicken or turkey), or lean cuts of red meat (beef or pork). Before you cook meat or poultry, cut off any visible fat.   Use less fat and oil.  Try baking foods instead of frying them. Add less fat, such as margarine, sour cream, regular salad dressing and mayonnaise to foods. Eat fewer high-fat foods. Some examples of high-fat foods include french fries, doughnuts, ice cream, and cakes.  Eat fewer sweets.  Limit  foods and drinks that are high in sugar. This includes candy, cookies, regular soda, and sweetened drinks.  Exercise:  Exercise at least 30 minutes per day on most days of the week. Some examples of exercise include walking, biking, dancing, and swimming. You can also fit in more physical activity by taking the stairs instead of the elevator or parking farther away from stores. Ask your healthcare provider about the best exercise plan for you.      © Copyright [employer] 2018 Information is for End User's use only and may not be sold, redistributed or otherwise used for commercial purposes. All illustrations and images included in CareNotes® are the copyrighted property of [employer] or [employer]

## 2025-03-06 NOTE — PROGRESS NOTES
Name: Tian Garcia      : 1950      MRN: [id number]  Encounter Provider: Aneta Santillan DO  Encounter Date: 3/6/2025   Encounter department: [facility] PRIMARY CARE [address]    Assessment & Plan  Medicare annual wellness visit, subsequent         Musculoskeletal thigh pain, left    Orders:    MRI knee left  wo contrast; Future    Cervical spine pain    Orders:    XR spine cervical complete 6+ vw flex/ext/obl; Future    Use of cane as ambulatory aid         Atherosclerosis of native coronary artery of native heart without angina pectoris         Severe aortic stenosis           Depression Screening and Follow-up Plan: Patient was screened for depression during today's encounter. They screened negative with a PHQ-9 score of 4.      ASCVD Risk Management:  The ASCVD Risk score (Mary MONCADA, et al., 2019) failed to calculate for the following reasons:    Risk score cannot be calculated because patient has a medical history suggesting prior/existing ASCVD    Patent does not have underlying ASCVD. Their ASCVD Risk is intermediate (7.5 to < 20 %).    Preventive services discussed: diet & exercise.    Preventive health issues were discussed with patient, and age appropriate screening tests were ordered as noted in patient's After Visit Summary. Personalized health advice and appropriate referrals for health education or preventive services given if needed, as noted in patient's After Visit Summary.    History of Present Illness     Here for medicare wellness  Also having left knee pain and had popping on lateral tendon- had seen  ortho and had xrays- with recurrent symptoms - will have mri ordered- ? Tendon retraction  Cervical pain       Patient Care Team:  Aneta Santillan DO as PCP - General (Internal Medicine)  DO Ronnie Chavez MD Paul Marion, MD Hina K Trivedi, DO (Nephrology)  Renata Covarrubias DO (Nephrology)    Review of Systems   Constitutional:  Negative for fatigue.   HENT:  Positive for  congestion and postnasal drip.    Respiratory:  Negative for shortness of breath.    All other systems reviewed and are negative.    Medical History Reviewed by provider this encounter:  Tobacco  Allergies  Meds  Problems  Med Hx  Surg Hx  Fam Hx       Annual Wellness Visit Questionnaire   Tian is here for his Subsequent Wellness visit.     Health Risk Assessment:   Patient rates overall health as fair. Patient feels that their physical health rating is slightly better. Patient is dissatisfied with their life. Eyesight was rated as slightly worse. Hearing was rated as same. Patient feels that their emotional and mental health rating is slightly better. Patients states they are never, rarely angry. Patient states they are sometimes unusually tired/fatigued. Pain experienced in the last 7 days has been some. Patient's pain rating has been 9/10. Patient states that he has experienced no weight loss or gain in last 6 months.     Depression Screening:   PHQ-9 Score: 4      Fall Risk Screening:   In the past year, patient has experienced: no history of falling in past year      Home Safety:  Patient has trouble with stairs inside or outside of their home. Patient has working smoke alarms and has working carbon monoxide detector. Home safety hazards include: none.     Nutrition:   Current diet is No Added Salt and Regular.     Medications:   Patient is currently taking over-the-counter supplements. OTC medications include: see medication list. Patient is able to manage medications.     Activities of Daily Living (ADLs)/Instrumental Activities of Daily Living (IADLs):   Walk and transfer into and out of bed and chair?: Yes  Dress and groom yourself?: Yes    Bathe or shower yourself?: Yes    Feed yourself? Yes  Do your laundry/housekeeping?: Yes  Manage your money, pay your bills and track your expenses?: Yes  Make your own meals?: Yes    Do your own shopping?: Yes    Previous Hospitalizations:   Any hospitalizations  or ED visits within the last 12 months?: Yes    How many hospitalizations have you had in the last year?: 1-2    Advance Care Planning:   Living will: Yes    Durable POA for healthcare: Yes    Advanced directive: Yes    Advanced directive counseling given: Yes    End of Life Decisions reviewed with patient: Yes    Provider agrees with end of life decisions: Yes      Comments: Wife Elsy and son  are medical poa    PREVENTIVE SCREENINGS      Cardiovascular Screening:    General: Screening Not Indicated and History Lipid Disorder      Diabetes Screening:     General: Screening Current      Abdominal Aortic Aneurysm (AAA) Screening:    Risk factors include: age between 65-74 yo        Lung Cancer Screening:     General: Screening Not Indicated      Hepatitis C Screening:    General: Screening Current    Screening, Brief Intervention, and Referral to Treatment (SBIRT)     Screening      AUDIT-C Screenin) How often did you have a drink containing alcohol in the past year? never  2) How many drinks did you have on a typical day when you were drinking in the past year? 0  3) How often did you have 6 or more drinks on one occasion in the past year? never    AUDIT-C Score: 0  Interpretation: Score 0-3 (male): Negative screen for alcohol misuse    Single Item Drug Screening:  How often have you used an illegal drug (including marijuana) or a prescription medication for non-medical reasons in the past year? never    Single Item Drug Screen Score: 0  Interpretation: Negative screen for possible drug use disorder    Social Drivers of Health     Financial Resource Strain: Medium Risk (2024)    Overall Financial Resource Strain (CARDIA)     Difficulty of Paying Living Expenses: Somewhat hard   Food Insecurity: No Food Insecurity (3/6/2025)    Hunger Vital Sign     Worried About Running Out of Food in the Last Year: Never true     Ran Out of Food in the Last Year: Never true   Transportation Needs: No Transportation Needs  "(3/6/2025)    PRAPARE - Transportation     Lack of Transportation (Medical): No     Lack of Transportation (Non-Medical): No   Housing Stability: Low Risk  (3/6/2025)    Housing Stability Vital Sign     Unable to Pay for Housing in the Last Year: No     Number of Times Moved in the Last Year: 0     Homeless in the Last Year: No   Utilities: Not At Risk (3/6/2025)    [facility] Utilities     Threatened with loss of utilities: No     No results found.    Objective   /60   Pulse 84   Ht 5' 11\" (1.803 m)   Wt 111 kg (245 lb)   SpO2 94%   BMI 34.17 kg/m²     Physical Exam  Vitals and nursing note reviewed.   Constitutional:       General: He is not in acute distress.  HENT:      Head: Normocephalic.      Right Ear: Tympanic membrane normal. There is no impacted cerumen.      Left Ear: Tympanic membrane normal. There is no impacted cerumen.      Mouth/Throat:      Pharynx: No posterior oropharyngeal erythema.   Eyes:      General:         Right eye: No discharge.         Left eye: No discharge.      Pupils: Pupils are equal, round, and reactive to light.   Cardiovascular:      Rate and Rhythm: Normal rate and regular rhythm.      Heart sounds: No murmur heard.  Pulmonary:      Breath sounds: No wheezing or rhonchi.   Abdominal:      Tenderness: There is no abdominal tenderness.      Hernia: No hernia is present.   Musculoskeletal:         General: Tenderness present.      Cervical back: No tenderness.      Comments: Left knee lateral tendon   Skin:     Findings: No erythema.   Neurological:      Mental Status: He is alert and oriented to person, place, and time.      Cranial Nerves: No cranial nerve deficit.      Sensory: No sensory deficit.   Psychiatric:         Mood and Affect: Mood normal.         Thought Content: Thought content normal.         "

## 2025-03-06 NOTE — PROGRESS NOTES
PT Evaluation     Today's date: 3/6/2025  Patient name: Tian Garcia  : 1950  MRN: [id number]  Referring provider: Ronnie Thorpe PT  Dx:   Encounter Diagnosis     ICD-10-CM    1. Chronic right shoulder pain  M25.511     G89.29                      Assessment  Impairments: abnormal or restricted ROM, activity intolerance, impaired physical strength, lacks appropriate home exercise program and pain with function  Symptom irritability: moderate    Assessment details: Tian Garcia is a 75 y.o. male presenting to outpatient physical therapy with chief complaints of (R) Shoulder pain. Patient describes chronic pain for many years. Patient displays with abnormal posture, restricted (R) shoulder A/PROM, (R) shoulder strength deficits, (+) RC testing, and functional restrictions.  Patient's symptoms are multifactorial in nature with a primary movement diagnosis of hypomobility resulting in pathoanatomical signs and symptoms consistent with Chronic right shoulder pain  (primary encounter diagnosis) resulting in limitations in his ability to lift and reach with his shoulder for daily tasks.  No further referral appears necessary at this time based upon examination results.    Please contact me if you have any questions. Thank you for the opportunity to share in the care of this patient.        Understanding of Dx/Px/POC: good     Prognosis: fair  Prognosis details: Positive prognostic indicators include positive attitude toward recovery, motivated to improve, good understanding of condition, and realistic expectations.  Negative prognostic indicators include chronicity of symptoms.       Goals  STG to be met in 4 weeks:  - Increase (R) Shoulder AROM: Flexion to 130 degrees, ABD to 130 degrees, Functional ER to C7, Functional IR to L3.  - Increase (R) UE strength by 1/2 MMT grade.  - I with HEP.  - Patient will be able to get dressed without increased pain.   LTG to be met in 8 weeks:  - Increase (R) Shoulder  AROM: Flexion to 150 degrees, ABD to 150 degrees.  - Increase (R) UE strength to 4+/5 all planes.  - I with updated HEP.  - Patient will be able to perform all daily housework without increased pain.       Plan  Patient would benefit from: skilled physical therapy  Planned modality interventions: cryotherapy and thermotherapy: hydrocollator packs    Planned therapy interventions: joint mobilization, activity modification, manual therapy, motor coordination training, neuromuscular re-education, body mechanics training, patient education, postural training, strengthening, stretching, therapeutic activities, therapeutic exercise, functional ROM exercises and home exercise program    Plan of Care beginning date: 3/6/2025  Plan of Care expiration date: 2025  Treatment plan discussed with: patient  Plan details: Prognosis above is given PT services 2x/week tapering to 1x/week over the next 8 weeks and home program adherence.           Subjective Evaluation    History of Present Illness  Mechanism of injury: At Evaluation (2025): Patient reports chronic (R) shoulder pain for many years.  Over the years he has managed his symptoms with activity modification, medication, and cortisone injection.  His symptoms have persisted - had been seeing me for his lower back - requested an evaluation to see what we could do for his shoulder.      Red Flag Screen: Patient denies: fever, changes in bowel or bladder function, headache, dizziness, visual changes, nausea, vomiting, weakness, unexplained weight loss, numbness, tingling , pain with coughing/ sneezing, or traumatic ALYSON.     Greatest concern: persistent pain and lack of strength  Quality of life: good    Patient Goals  Patient goal: Patient Specific Functional Scale: get dressed comfortably 0/10, return to lifting without increased pain 0/10, learn management strategies 0/10; Total 0/30  Pain  Current pain ratin  At best pain ratin  At worst pain rating:  9  Location: (R) Shoulder  Quality: dull ache and sharp  Alleviating factors: Activity modification.  Exacerbated by: movements away from body, lifting, pushing, pulling, getting dressed, reaching behind back or across body, lying on (R) side.    Social Support  Lives with: spouse    Employment status: not working  Hand dominance: right      Diagnostic Tests  X-ray: abnormal  Treatments  Previous treatment: injection treatment        Objective     Static Posture     Head  Forward.    Thoracic Spine  Hyperkyphosis.    Palpation     Additional Palpation Details  TTP posterior (R) shoulder     Active Range of Motion   Left Shoulder   Flexion: 145 degrees   Extension: 50 degrees   Abduction: 165 degrees   External rotation BTH: T1   Internal rotation BTB: T12     Right Shoulder   Flexion: 90 degrees with pain  Extension: 50 degrees   Abduction: 85 degrees with pain  External rotation BTH: C4 with pain  Internal rotation BTB: L5 with pain    Passive Range of Motion     Right Shoulder   Flexion: 110 degrees   Abduction: 115 degrees     Strength/Myotome Testing     Left Shoulder     Planes of Motion   Flexion: 4+   Extension: 4+   Abduction: 4+   External rotation at 0°: 4+   Internal rotation at 0°: 4+     Isolated Muscles   Biceps: 4+   Triceps: 4+     Right Shoulder     Planes of Motion   Flexion: 3-   Extension: 4+   Abduction: 3-   External rotation at 0°: 3+   Internal rotation at 0°: 4     Isolated Muscles   Biceps: 4   Lower trapezius: 3+   Middle trapezius: 3+   Triceps: 4+     Tests     Right Shoulder   Positive empty can, full can, horn blower and scapular relocation.   Negative drop arm.             Daily Treatment Diary     DX: (R) Shoulder pain   Follow Up with Referring Provider:   Precautions: NA  CO-MORBIDITIES:  Hx of neck fusion, (B) TKA, A-fib, CHF   HEP ACCESS CODE: [id number]       POC Expires Reeval for Medicare to be completed Unit Limit Auth Expiration Date PT/OT/STVisit Limit   5/1/25 By visit NA   NA 12/31/25 BOMN    Completed on visit                  Stage: Chronic   Etiology: disuse  Stability of Symptoms:  At Evaluation: Stable - unchanged  Global Rating of Change:   Symptom Irritability Level: Moderate  Primary Movement System Diagnosis: Hypomobility  Primary Pain Phenotype: Nociceptive  Patient Specific Functional Scale:   3/6/25: get dressed comfortably 0/10, return to lifting without increased pain 0/10, learn management strategies 0/10; Total 0/30   Patient Acceptable Symptom State: unacceptable  FOTO Prediction: 61 by visit 11  FOTO Progress: 45 at evaluation   Greatest Concern: persistent pain   Current Activity Plan: added to HEP (3/6)  Current Educational Needs: appropriate activity modifications, appropriate activity progressions, timeframe for recovery, next steps to take to progress towards goals      Auth Status DATE 3/6        NA Visit #          Remaining         MANUAL THERAPY         (R) Shoulder Mobs         (R) Shoulder PROM                                             THERAPEUTIC EXERCISE HEP         UBE                    Pendulums 3/6         Pulley Stretches  Flex  Scaption  ABD          Mod Prone Sh' Row 3/6         Mod Prone Sh' Ext 3/6         Mod Prone Sh' ABD 3/6                   Supine Sh' AAROM Flex          S/L Sh' AAROM ABD          S/L Sh' AAROM ER                                        Bicep Curls                               NEUROMUSCULAR REEDUCATION           Ball Stabilization at Wall                              TB Sh' Row          TB Sh' Ext          TB Sh' ER          TB Sh' IR                                                                                THERAPEUTIC ACTIVITY                                                  GAIT TRAINING                                                  MODALITIES

## 2025-03-10 ENCOUNTER — OFFICE VISIT (OUTPATIENT)
Dept: PHYSICAL THERAPY | Facility: CLINIC | Age: 75
End: 2025-03-10
Payer: COMMERCIAL

## 2025-03-10 DIAGNOSIS — M25.511 CHRONIC RIGHT SHOULDER PAIN: Primary | ICD-10-CM

## 2025-03-10 DIAGNOSIS — G89.29 CHRONIC RIGHT SHOULDER PAIN: Primary | ICD-10-CM

## 2025-03-10 PROCEDURE — 97140 MANUAL THERAPY 1/> REGIONS: CPT | Performed by: PHYSICAL THERAPIST

## 2025-03-10 PROCEDURE — 97110 THERAPEUTIC EXERCISES: CPT | Performed by: PHYSICAL THERAPIST

## 2025-03-10 NOTE — PROGRESS NOTES
Daily Note     Today's date: 3/10/2025  Patient name: Tian Garcia  : 1950  MRN: [id number]  Referring provider: Ronnie Thorpe, PT  Dx:   Encounter Diagnosis     ICD-10-CM    1. Chronic right shoulder pain  M25.511     G89.29                      Subjective:   Current Status: achy and sore today   New Symptoms/ Problems: NA  Response to Last Treatment: no adverse reaction  HEP/ Activity Recommendations:  able to perform some exercises  Progress Towards Goals: continues to have difficulty with repetitive movements and anything over shoulder height    Objective: See treatment diary below      Assessment:   Stage: Chronic   Etiology: disuse  Stability of Symptoms:  At Evaluation: Stable - unchanged  Global Rating of Change:   Symptom Irritability Level: Moderate  Primary Movement System Diagnosis: Hypomobility  Primary Pain Phenotype: Nociceptive  Patient Specific Functional Scale:   3/6/25: get dressed comfortably 0/10, return to lifting without increased pain 0/10, learn management strategies 0/10; Total 0/30   Patient Acceptable Symptom State: unacceptable  FOTO Prediction: 61 by visit 11  FOTO Progress: 45 at evaluation   Greatest Concern: persistent pain   Current Activity Plan: added to HEP (3/6)  Current Educational Needs: appropriate activity modifications, appropriate activity progressions, timeframe for recovery, next steps to take to progress towards goals    Patient had good tolerance to manual treatment - improved pain levels following.  He tolerated strengthening exercises well - noted fatigue but no increased pain.  Skilled PT continues to be required to guide progression of mobility and control to allow him to return to dressing comfortably and lifting without pain.       Plan: Continue with POC.  Monitor tolerance to last treatment and activity recommendations.  Follow up with me on 3/17/25.  Continue progressing shoulder mobility with manual treatment and stretching.          Daily  "Treatment Diary     DX: (R) Shoulder pain   Follow Up with Referring Provider:   Precautions: NA  CO-MORBIDITIES:  Hx of neck fusion, (B) TKA, A-fib, CHF   HEP ACCESS CODE: [id number]       POC Expires Reeval for Medicare to be completed Unit Limit Auth Expiration Date PT/OT/STVisit Limit   5/1/25 By visit NA  NA 12/31/25 BOMN    Completed on visit                        Auth Status DATE 3/10        NA Visit # 2         Remaining         MANUAL THERAPY         (R) Shoulder Mobs Post, Inf  Gr 2/3  4 min        (R) Shoulder PROM Flex, ABD  4 min                                            THERAPEUTIC EXERCISE HEP         UBE  3'/3'                  Pendulums 3/6 CW/CCW  20x ea        Pulley Stretches  Flex  Scaption  ABD  5\"x10 ea        Mod Prone Sh' Row 3/6 3# 20x        Mod Prone Sh' Ext 3/6 2# 20x        Mod Prone Sh' ABD 3/6 0# 20x                  Supine Sh' AAROM Flex  Min A  20x        S/L Sh' AAROM ABD  Min A  20x        S/L Sh' AROM ER  0#  20x                                      Bicep Curls                               NEUROMUSCULAR REEDUCATION           Ball Stabilization at Wall  Flex/ Ext  H'ABD/ADD  10xea                            TB Sh' Row          TB Sh' Ext          TB Sh' ER          TB Sh' IR                                                                                THERAPEUTIC ACTIVITY                                                  GAIT TRAINING                                                  MODALITIES                                         "

## 2025-03-12 ENCOUNTER — OFFICE VISIT (OUTPATIENT)
Dept: OBGYN CLINIC | Facility: CLINIC | Age: 75
End: 2025-03-12
Payer: COMMERCIAL

## 2025-03-12 ENCOUNTER — HOSPITAL ENCOUNTER (OUTPATIENT)
Dept: RADIOLOGY | Facility: HOSPITAL | Age: 75
Discharge: HOME | End: 2025-03-12
Attending: ORTHOPAEDIC SURGERY
Payer: COMMERCIAL

## 2025-03-12 VITALS — BODY MASS INDEX: 35 KG/M2 | HEIGHT: 71 IN | WEIGHT: 250 LBS

## 2025-03-12 DIAGNOSIS — Z96.652 S/P TOTAL KNEE REPLACEMENT USING CEMENT, LEFT: ICD-10-CM

## 2025-03-12 DIAGNOSIS — Z96.651 STATUS POST TOTAL KNEE REPLACEMENT USING CEMENT, RIGHT: ICD-10-CM

## 2025-03-12 DIAGNOSIS — Z96.652 STATUS POST TOTAL KNEE REPLACEMENT USING CEMENT, LEFT: Primary | ICD-10-CM

## 2025-03-12 PROCEDURE — 99213 OFFICE O/P EST LOW 20 MIN: CPT | Performed by: ORTHOPAEDIC SURGERY

## 2025-03-12 PROCEDURE — 73562 X-RAY EXAM OF KNEE 3: CPT

## 2025-03-12 NOTE — ASSESSMENT & PLAN NOTE
Discomfort behind knee cap is due to quadriceps weakness  Continue strengthening in PT and with home exercise  Orders:    XR knee 3 vw right non injury; Future

## 2025-03-12 NOTE — PROGRESS NOTES
Assessment:  Assessment & Plan  Status post total knee replacement using cement, left  Proceed with MRI ordered by PCP as scheduled on 3/27/2025  Palpable non mobile lump on superolateral aspect of incision stable to coronal and sagittal stress. The MRI will evaluate this part of the knee  Continue strengthening in PT and with home exercise  Orders:    XR knee 3 vw left non injury; Future    Status post total knee replacement using cement, right  Discomfort behind knee cap is due to quadriceps weakness  Continue strengthening in PT and with home exercise  Orders:    XR knee 3 vw right non injury; Future    To do next visit:  Return if symptoms worsen or fail to improve.    The above stated was discussed in layman's terms and the patient expressed understanding.  All questions were answered to the patient's satisfaction.       Scribe Attestation      I,:  Odalis Soares am acting as a scribe while in the presence of the attending physician.:       I,:  Dot Galindo MD personally performed the services described in this documentation    as scribed in my presence.:               Subjective:   Tian Garcia is a 75 y.o. male who presents 5.5 months s/p left total knee arthroplasty and 11 months s/p right total knee arthroplasty. The right knee is doing well. He states he has continued anterior left knee pain when standing from a seated position. He reports about one month ago he felt a pop in the left knee, and there was a bulge anteriorly.This happened a total of 3 times but most recently last week. He is still attending physical therapy and notes difficulty with certain exercises involving the left knee.      Review of systems negative unless otherwise specified in HPI  Review of Systems   Constitutional:  Negative for chills and fever.   HENT:  Negative for ear pain and sore throat.    Eyes:  Negative for pain and visual disturbance.   Respiratory:  Negative for cough and shortness of breath.    Cardiovascular:   Negative for chest pain and palpitations.   Gastrointestinal:  Negative for abdominal pain and vomiting.   Genitourinary:  Negative for dysuria and hematuria.   Musculoskeletal:  Negative for arthralgias and back pain.   Skin:  Negative for color change and rash.   Neurological:  Negative for seizures and syncope.   All other systems reviewed and are negative.      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, 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, on steroids for prolonged period    Ambulatory dysfunction     cane use    Anemia     BPH (benign prostatic hyperplasia)     CHF (congestive heart failure) ([facility])     Chronic kidney disease     stage 3b, baseline Cr 2.2    DDD (degenerative disc disease), cervical     DDD (degenerative disc disease), lumbar     Diverticulosis     Factor V Leiden ([facility])     History of DVT (deep vein thrombosis)     from Facotr V, on Coumadin    [place] (hard of hearing)     Hyperlipidemia     Hypertension     Insomnia     Ischemic cardiomyopathy     EF 33%    MRSA carrier     Neuropathy     Obesity     WILL on CPAP     Osteoarthritis     Other acute osteomyelitis, other site ([facility]) 01/03/2023    Paroxysmal atrial fibrillation ([facility])     s/p ablation, s/p DCCV, Coumadin, on Amiodarone for    RBBB     Rotator cuff tear     right    Severe aortic stenosis     Steroid-induced hyperglycemia     requiring insulin, w/ MRSA infection 2022, resolved       Past Surgical History:   Procedure Laterality Date    CARDIAC CATHETERIZATION N/A 04/09/2023    Procedure: Cardiac pci;  Surgeon: Bird Cast MD;  [facility];  Service: Cardiology    CARDIAC CATHETERIZATION N/A 04/09/2023    Procedure: Cardiac Coronary Angiogram;  Surgeon: Bird Cast MD;  [facility];  Service: Cardiology    CARDIAC ELECTROPHYSIOLOGY PROCEDURE N/A 1/2/2025    Procedure: Cardiac eps/afib ablation PFA;  Surgeon: Derik  MD Brennan;  [facility]: [facility];  Service: Cardiology    CATARACT EXTRACTION Right     COLON SURGERY      COLONOSCOPY      INCISION AND DRAINAGE POSTERIOR SPINE N/A 04/01/2022    Procedure: Posterior cervical evacuation of postoperative collection and debridement with placement of drains C3-T1;  Surgeon: Rajeev Garcia MD;  [facility]: [facility];  Service: Neurosurgery    IR BIOPSY KIDNEY RANDOM  05/26/2022    IR TUNNELED DIALYSIS CATHETER PLACEMENT  05/23/2022    IR TUNNELED DIALYSIS CATHETER REMOVAL  06/02/2022    LUMBAR SPINE SURGERY      herniated disc    NC ARTHRD ANT INTERBODY MIN DSC CRV BELOW C2 N/A 03/11/2022    Procedure: Anterior cervical discectomy and fixation fusion C5/6 and C6/7; Posterior cervical decompression and instrumented fusion C3-T1;  Surgeon: Rajeev Garcia MD;  [facility];  Service: Neurosurgery    NC ARTHRP KNE CONDYLE&PLATU MEDIAL&LAT COMPARTMENTS Right 04/08/2024    Procedure: ARTHROPLASTY KNEE TOTAL and all associated procedures;  Surgeon: Dot Galindo MD;  [facility];  Service: Orthopedics    NC ARTHRP KNE CONDYLE&PLATU MEDIAL&LAT COMPARTMENTS Left 09/23/2024    Procedure: ARTHROPLASTY KNEE TOTAL;  Surgeon: Dot Galindo MD;  [facility];  Service: Orthopedics    RENAL BIOPSY  6/2022    SPINE SURGERY  03/11/2022    Cervical myelopathy/ cervical fusion       Family History   Problem Relation Age of Onset    Lung cancer Mother     Hearing loss Father     Emphysema Sister     Heart attack Brother     Atrial fibrillation Brother     Heart failure Brother     Other (atrial fib) Brother     Clotting disorder Son        Social History     Occupational History    Occupation: Retired   Tobacco Use    Smoking status: Never    Smokeless tobacco: Never   Vaping Use    Vaping status: Never Used   Substance and Sexual Activity    Alcohol use: Not Currently    Drug use: No    Sexual activity: Not Currently     Partners: Female          Current Outpatient Medications:     Aspirin 81 MG CAPS, Take by mouth, Disp: , Rfl:     atorvastatin (LIPITOR) 80 mg tablet, TAKE 1 TABLET BY MOUTH EVERY EVENING, Disp: 90 tablet, Rfl: 1    DULoxetine (CYMBALTA) 60 mg delayed release capsule, TAKE 1 CAPSULE BY MOUTH EVERY DAY, Disp: 90 capsule, Rfl: 1    ezetimibe (ZETIA) 10 mg tablet, TAKE 1 TABLET BY MOUTH EVERY DAY, Disp: 90 tablet, Rfl: 3    gabapentin (NEURONTIN) 300 mg capsule, TAKE 1 CAPSULE BY MOUTH  DAILY AT BEDTIME, Disp: 90 capsule, Rfl: 1    glucose blood (Accu-Chek Guide) test strip, Use one new strip daily dx r73.01, Disp: 100 strip, Rfl: 1    lisinopril (ZESTRIL) 5 mg tablet, Take 1 tablet (5 mg total) by mouth daily, Disp: 90 tablet, Rfl: 3    metoprolol succinate (TOPROL-XL) 25 mg 24 hr tablet, Take 1 tablet (25 mg total) by mouth 2 (two) times a day, Disp: 180 tablet, Rfl: 3    mometasone (ELOCON) 0.1 % cream, APPLY TO AFFECTED AREA TOPICALLY EVERY DAY, Disp: 45 g, Rfl: 1    nitroglycerin (NITROSTAT) 0.4 mg SL tablet, Place 1 tablet (0.4 mg total) under the tongue every 5 (five) minutes as needed for chest pain (Patient not taking: Reported on 1/24/2025), Disp: 30 tablet, Rfl: 0    potassium chloride (Klor-Con M20) 20 mEq tablet, Take 1 tablet (20 mEq total) by mouth daily, Disp: 90 tablet, Rfl: 3    tamsulosin (FLOMAX) 0.4 mg, TAKE 1 CAPSULE BY MOUTH EVERY DAY WITH DINNER, Disp: 90 capsule, Rfl: 1    torsemide (DEMADEX) 20 mg tablet, Take 1 tablet (20 mg total) by mouth daily, Disp: 90 tablet, Rfl: 3    warfarin (COUMADIN) 5 mg tablet, TAKE ONE-HALF TABLET BY MOUTH  DAILY , EXCEPT TAKE 1 TABLET BY  MOUTH ON WEDNESDAY AND SATURDAY, Disp: 58 tablet, Rfl: 1    zolpidem (AMBIEN) 10 mg tablet, Take 1 tablet (10 mg total) by mouth daily at bedtime as needed for sleep, Disp: 90 tablet, Rfl: 0    Allergies   Allergen Reactions    Morphine GI Intolerance    Vitamin K Other (See Comments)     On coumadin-patient sensitive to vitamin K amounts in  meds etc.          There were no vitals filed for this visit.    Body mass index is 34.87 kg/m².  Wt Readings from Last 3 Encounters:   03/12/25 113 kg (250 lb)   03/06/25 111 kg (245 lb)   03/04/25 108 kg (238 lb)       Objective:                    Right Knee Exam     Tenderness   The patient is experiencing no tenderness.     Range of Motion   Extension:  0   Flexion:  120     Other   Erythema: absent  Scars: present (anterior surgical scar well-healed)  Sensation: normal  Pulse: present  Swelling: none  Effusion: no effusion present    Comments:  Patella tracks midline and flat  Calf compartments are soft and supple  2+ DP and PT pulses with brisk capillary refill to the toes  Sural, saphenous, tibial, superficial, and deep peroneal motor and sensory distributions intact  Sensation light touch intact distally       Left Knee Exam     Tenderness   Left knee tenderness location: distal quadriceps.    Range of Motion   Extension:  0   Flexion:  120     Other   Erythema: absent  Scars: present (anterior surgical scar well-healed)  Sensation: normal  Pulse: present  Swelling: none  Effusion: no effusion present    Comments:  Patella tracks midline and flat  Extensor mechanism intact  Able to activate quadriceps  Non mobile lump on lateral aspect of incision stable to coronal and sagittal stress  Calf compartments are soft and supple  2+ DP and PT pulses with brisk capillary refill to the toes  Sural, saphenous, tibial, superficial, and deep peroneal motor and sensory distributions intact  Sensation light touch intact distally             Diagnostics, reviewed and taken today if performed as documented:    The attending physician has personally reviewed the pertinent films in PACS and interpretation is as follows: XR bilateral knees obtained on 3/12/2025 demonstrate well-positioned, well-aligned total knee prostheses. No evidence of periprosthetic fracture or failure.      Procedures, if performed today:    None  "performed      Portions of the record may have been created with voice recognition software.  Occasional wrong word or \"sound a like\" substitutions may have occurred due to the inherent limitations of voice recognition software.  Read the chart carefully and recognize, using context, where substitutions have occurred.   "

## 2025-03-12 NOTE — PROGRESS NOTES
"Daily Note     Today's date: 2024  Patient name: Tian Garcia  : 1950  MRN: [id number]  Referring provider: Dot Galindo MD  Dx:   Encounter Diagnosis     ICD-10-CM    1. Primary osteoarthritis of right knee  M17.11       2. Ambulatory dysfunction  R26.2                      Subjective: Has been sore for the last 48 hours. Took more time to rest and didn't do as much walking.       Objective: See treatment diary below      Assessment: Patient tolerated standing exercises well this date despite pain. Plan next session to attempt some more ambulation practice with one hand.       Plan: Continue per plan of care.      Short Term Goal Expiration Date:(24)  Long Term Goal Expiration Date: (24)  POC Expiration Date: (24)      POC expires Unit limit Auth Expiration date PT/OT/ST + Visit Limit?   24 bomn bomn bomn                           Visit/Unit Tracking  AUTH Status:  Date 3/27 4/12            pending Used 1 3             Remaining                     Precautions fall risk       Manuals 3/27 4/10 4/12 4/15 4/17                                   Neuro Re-Ed         Sit to stand   10x 10x2 10x2   Step ups    6\" fwd/lat 10x ea  6\" fwd/lat 10x ea                                            Ther Ex        Ankle pumps        NUstep    5 min L5 5 min L5   Heel raise    30x 20x   Glut sets  5 sec x 15      Supine Heel slide  10 x 2 10x 2 with overpressure by therapist 10x 2 with over pressure - 105 degrees flex 10 x 2 no overpressure this date   Supine Quad set  5 sec hold x 15 5 sec hold x 15     LAQ   10x 3     Supine hip abd  2x10  2x10 Sidelying - 2x10 with assist    SAQ  2x 10  2x10     SLR   2x10 2x10 2x10   Ther Activity                        Gait Training                        Modalities                                            " Called pt to discuss referral for constipation. No answer, pt's Vm not yet set up. No contact made.

## 2025-03-12 NOTE — ASSESSMENT & PLAN NOTE
Proceed with MRI ordered by PCP as scheduled on 3/27/2025  Palpable non mobile lump on superolateral aspect of incision stable to coronal and sagittal stress. The MRI will evaluate this part of the knee  Continue strengthening in PT and with home exercise  Orders:    XR knee 3 vw left non injury; Future

## 2025-03-14 ENCOUNTER — OFFICE VISIT (OUTPATIENT)
Dept: PAIN MEDICINE | Facility: CLINIC | Age: 75
End: 2025-03-14
Payer: COMMERCIAL

## 2025-03-14 ENCOUNTER — RESULTS FOLLOW-UP (OUTPATIENT)
Dept: FAMILY MEDICINE CLINIC | Facility: HOSPITAL | Age: 75
End: 2025-03-14

## 2025-03-14 ENCOUNTER — APPOINTMENT (OUTPATIENT)
Dept: RADIOLOGY | Facility: CLINIC | Age: 75
End: 2025-03-14
Payer: COMMERCIAL

## 2025-03-14 VITALS — BODY MASS INDEX: 34.72 KG/M2 | OXYGEN SATURATION: 98 % | WEIGHT: 248 LBS | TEMPERATURE: 97.9 F | HEIGHT: 71 IN

## 2025-03-14 DIAGNOSIS — Z98.1 HISTORY OF FUSION OF CERVICAL SPINE: ICD-10-CM

## 2025-03-14 DIAGNOSIS — M54.2 NECK PAIN: ICD-10-CM

## 2025-03-14 DIAGNOSIS — M47.812 CERVICAL SPONDYLOSIS: Primary | ICD-10-CM

## 2025-03-14 DIAGNOSIS — M54.2 CERVICAL SPINE PAIN: ICD-10-CM

## 2025-03-14 DIAGNOSIS — M54.12 CERVICAL RADICULOPATHY: ICD-10-CM

## 2025-03-14 PROCEDURE — 99214 OFFICE O/P EST MOD 30 MIN: CPT | Performed by: PHYSICIAN ASSISTANT

## 2025-03-14 PROCEDURE — 72052 X-RAY EXAM NECK SPINE 6/>VWS: CPT

## 2025-03-14 NOTE — PROGRESS NOTES
Assessment:  1. Cervical spondylosis    2. History of fusion of cervical spine    3. Neck pain        Plan:  While the patient was in the office today, I did have a thorough conversation regarding their chronic pain syndrome, medication management, and treatment plan options.    After discussing options, I have placed an order for physical therapy to address the neck pain.    Patient will proceed with cervical spine x-rays as ordered by his PCP.    Follow-up after the above for reevaluation to see if an MRI of the cervical spine is warranted.  Injection therapy would be very limited due to his extensive surgical hardware.  Consider trigger point injections for the myofascial component.    The patient was advised to contact the office should their symptoms worsen in the interim. The patient was agreeable and verbalized an understanding.        History of Present Illness:    The patient is a 75 y.o. male last seen on 10/11/2022 who presents for a follow up office visit in regards to chronic neck pain.  He was originally referred to us for low back pain which he states has improved significantly with physical therapy.  The patient currently reports neck pain that he rates an 8 out of 10 and describes as a constant sharp and shooting pain.  Pain is bilateral with no upper extremity radicular pain.  He has intermittent right upper extremity numbness and paresthesias.  He has a separate issue with his right shoulder which he is attending physical therapy for currently.  Patient has a history of cervical spine fusion surgery done approximately 2022 with Dr. Sun.  Unfortunately the patient developed infection which then required postoperative debridement, IV antibiotics and extensive hospital stay.  Patient states that he has had minimal neck pain up until a few weeks ago.  He denies any specific injury or trauma that would explain the resurgence of neck pain.    I have personally reviewed and/or updated the patient's  past medical history, past surgical history, family history, social history, current medications, allergies, and vital signs today.       Review of Systems:    Review of Systems   Respiratory:  Negative for shortness of breath.    Cardiovascular:  Negative for chest pain.   Gastrointestinal:  Negative for constipation, diarrhea, nausea and vomiting.   Musculoskeletal:  Negative for arthralgias, gait problem, joint swelling and myalgias.   Skin:  Negative for rash.   Neurological:  Negative for dizziness, seizures and weakness.   All other systems reviewed and are negative.        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, 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, on steroids for prolonged period   • Ambulatory dysfunction     cane use   • Anemia    • BPH (benign prostatic hyperplasia)    • CHF (congestive heart failure) ([facility])    • Chronic kidney disease     stage 3b, baseline Cr 2.2   • DDD (degenerative disc disease), cervical    • DDD (degenerative disc disease), lumbar    • Diverticulosis    • Factor V Leiden ([facility])    • History of DVT (deep vein thrombosis)     from Facotr V, on Coumadin   • [place] (hard of hearing)    • Hyperlipidemia    • Hypertension    • Insomnia    • Ischemic cardiomyopathy     EF 33%   • MRSA carrier    • Neuropathy    • Obesity    • WILL on CPAP    • Osteoarthritis    • Other acute osteomyelitis, other site ([facility]) 01/03/2023   • Paroxysmal atrial fibrillation ([facility])     s/p ablation, s/p DCCV, Coumadin, on Amiodarone for   • RBBB    • Rotator cuff tear     right   • Severe aortic stenosis    • Steroid-induced hyperglycemia     requiring insulin, w/ MRSA infection 2022, resolved       Past Surgical History:   Procedure Laterality Date   • CARDIAC CATHETERIZATION N/A 04/09/2023    Procedure: Cardiac pci;  Surgeon: Bird Cast MD;  [facility];  Service: Cardiology   • CARDIAC CATHETERIZATION N/A  04/09/2023    Procedure: Cardiac Coronary Angiogram;  Surgeon: Bird Cast MD;  [facility];  Service: Cardiology   • CARDIAC ELECTROPHYSIOLOGY PROCEDURE N/A 1/2/2025    Procedure: Cardiac eps/afib ablation PFA;  Surgeon: Derik Amin MD;  [facility];  Service: Cardiology   • CATARACT EXTRACTION Right    • COLON SURGERY     • COLONOSCOPY     • INCISION AND DRAINAGE POSTERIOR SPINE N/A 04/01/2022    Procedure: Posterior cervical evacuation of postoperative collection and debridement with placement of drains C3-T1;  Surgeon: Rajeev Garcia MD;  [facility];  Service: Neurosurgery   • IR BIOPSY KIDNEY RANDOM  05/26/2022   • IR TUNNELED DIALYSIS CATHETER PLACEMENT  05/23/2022   • IR TUNNELED DIALYSIS CATHETER REMOVAL  06/02/2022   • LUMBAR SPINE SURGERY      herniated disc   • FL ARTHRD ANT INTERBODY MIN DSC CRV BELOW C2 N/A 03/11/2022    Procedure: Anterior cervical discectomy and fixation fusion C5/6 and C6/7; Posterior cervical decompression and instrumented fusion C3-T1;  Surgeon: Rajeev Garcia MD;  [facility];  Service: Neurosurgery   • FL ARTHRP KNE CONDYLE&PLATU MEDIAL&LAT COMPARTMENTS Right 04/08/2024    Procedure: ARTHROPLASTY KNEE TOTAL and all associated procedures;  Surgeon: Dot Galindo MD;  [facility];  Service: Orthopedics   • FL ARTHRP KNE CONDYLE&PLATU MEDIAL&LAT COMPARTMENTS Left 09/23/2024    Procedure: ARTHROPLASTY KNEE TOTAL;  Surgeon: Dot Galindo MD;  [facility];  Service: Orthopedics   • RENAL BIOPSY  6/2022   • SPINE SURGERY  03/11/2022    Cervical myelopathy/ cervical fusion       Family History   Problem Relation Age of Onset   • Lung cancer Mother    • Hearing loss Father    • Emphysema Sister    • Heart attack Brother    • Atrial fibrillation Brother    • Heart failure Brother    • Other (atrial fib) Brother    • Clotting disorder Son        Social History     Occupational  History   • Occupation: Retired   Tobacco Use   • Smoking status: Never   • Smokeless tobacco: Never   Vaping Use   • Vaping status: Never Used   Substance and Sexual Activity   • Alcohol use: Not Currently   • Drug use: No   • Sexual activity: Not Currently     Partners: Female         Current Outpatient Medications:   •  Aspirin 81 MG CAPS, Take by mouth, Disp: , Rfl:   •  atorvastatin (LIPITOR) 80 mg tablet, TAKE 1 TABLET BY MOUTH EVERY EVENING, Disp: 90 tablet, Rfl: 1  •  DULoxetine (CYMBALTA) 60 mg delayed release capsule, TAKE 1 CAPSULE BY MOUTH EVERY DAY, Disp: 90 capsule, Rfl: 1  •  ezetimibe (ZETIA) 10 mg tablet, TAKE 1 TABLET BY MOUTH EVERY DAY, Disp: 90 tablet, Rfl: 3  •  gabapentin (NEURONTIN) 300 mg capsule, TAKE 1 CAPSULE BY MOUTH  DAILY AT BEDTIME, Disp: 90 capsule, Rfl: 1  •  lisinopril (ZESTRIL) 5 mg tablet, Take 1 tablet (5 mg total) by mouth daily, Disp: 90 tablet, Rfl: 3  •  metoprolol succinate (TOPROL-XL) 25 mg 24 hr tablet, Take 1 tablet (25 mg total) by mouth 2 (two) times a day, Disp: 180 tablet, Rfl: 3  •  mometasone (ELOCON) 0.1 % cream, APPLY TO AFFECTED AREA TOPICALLY EVERY DAY, Disp: 45 g, Rfl: 1  •  potassium chloride (Klor-Con M20) 20 mEq tablet, Take 1 tablet (20 mEq total) by mouth daily, Disp: 90 tablet, Rfl: 3  •  tamsulosin (FLOMAX) 0.4 mg, TAKE 1 CAPSULE BY MOUTH EVERY DAY WITH DINNER, Disp: 90 capsule, Rfl: 1  •  torsemide (DEMADEX) 20 mg tablet, Take 1 tablet (20 mg total) by mouth daily, Disp: 90 tablet, Rfl: 3  •  warfarin (COUMADIN) 5 mg tablet, TAKE ONE-HALF TABLET BY MOUTH  DAILY , EXCEPT TAKE 1 TABLET BY  MOUTH ON WEDNESDAY AND SATURDAY, Disp: 58 tablet, Rfl: 1  •  zolpidem (AMBIEN) 10 mg tablet, Take 1 tablet (10 mg total) by mouth daily at bedtime as needed for sleep, Disp: 90 tablet, Rfl: 0  •  glucose blood (Accu-Chek Guide) test strip, Use one new strip daily dx r73.01, Disp: 100 strip, Rfl: 1  •  nitroglycerin (NITROSTAT) 0.4 mg SL tablet, Place 1 tablet (0.4 mg  "total) under the tongue every 5 (five) minutes as needed for chest pain (Patient not taking: Reported on 1/24/2025), Disp: 30 tablet, Rfl: 0    Allergies   Allergen Reactions   • Morphine GI Intolerance   • Vitamin K Other (See Comments)     On coumadin-patient sensitive to vitamin K amounts in meds etc.       Physical Exam:    Temp 97.9 °F (36.6 °C)   Ht 5' 11\" (1.803 m)   Wt 112 kg (248 lb)   SpO2 98%   BMI 34.59 kg/m²     Constitutional:normal, well developed, well nourished, alert, in no distress and non-toxic and no overt pain behavior.  Eyes:anicteric  HEENT:grossly intact  Neck:supple, symmetric, trachea midline and no masses   Pulmonary:even and unlabored  Cardiovascular:No edema or pitting edema present  Skin:Normal without rashes or lesions and well hydrated  Psychiatric:Mood and affect appropriate  Neurologic:Cranial Nerves II-XII grossly intact  Musculoskeletal: Patient ambulates with cane, posterior and anterior cervical surgical scars.  Tenderness along the bilateral paraspinal muscles of the cervical spine with limited range of motion.      Imaging      XR SPINE LUMBAR MINIMUM 4 VIEWS NON INJURY 1/25/2025 @ [facility]     INDICATION: M54.41: Lumbago with sciatica, right side.     COMPARISON: Thoracolumbar spine x-ray 5/11/2022     FINDINGS:     No acute fracture. Intact pedicles.     Five non-rib-bearing lumbar vertebral bodies.     Normal alignment.     Intervertebral disc space narrowing at L1-2, L2-3, L4-5, L5-S1 with endplate osteophytes and facet arthropathy.     Vascular calcifications.     IMPRESSION:     No acute osseous abnormality.       Degenerative changes as described.      XR KNEE 3 VW RIGHT NON INJURY 3/12/2025 @ [facility]     INDICATION: Z96.651: Presence of right artificial knee joint.     COMPARISON: Right knee x-ray dated July 10, 2024     FINDINGS:     No acute fracture or dislocation.     No joint effusion.     Total knee arthroplasty without evidence of a hardware complication " or failure.     No lytic or blastic osseous lesion.     Unremarkable soft tissues.     IMPRESSION:     Total knee arthroplasty without evidence of a hardware complication or failure.     XR KNEE 3 VW LEFT NON INJURY 3/12/2025 @ [facility]     INDICATION: Z96.652: Presence of left artificial knee joint.     COMPARISON: Left knee x-ray dated November 12, 2024     FINDINGS:     No acute fracture or dislocation.     No joint effusion.     Total knee arthroplasty without evidence of a hardware complication or failure.     No lytic or blastic osseous lesion.     Unremarkable soft tissues.     IMPRESSION:     Total knee arthroplasty without evidence of a hardware complication or failure.

## 2025-03-17 ENCOUNTER — OFFICE VISIT (OUTPATIENT)
Dept: PHYSICAL THERAPY | Facility: CLINIC | Age: 75
End: 2025-03-17
Payer: COMMERCIAL

## 2025-03-17 DIAGNOSIS — G89.29 CHRONIC RIGHT SHOULDER PAIN: Primary | ICD-10-CM

## 2025-03-17 DIAGNOSIS — M25.511 CHRONIC RIGHT SHOULDER PAIN: Primary | ICD-10-CM

## 2025-03-17 PROCEDURE — 97112 NEUROMUSCULAR REEDUCATION: CPT | Performed by: PHYSICAL THERAPIST

## 2025-03-17 PROCEDURE — 97110 THERAPEUTIC EXERCISES: CPT | Performed by: PHYSICAL THERAPIST

## 2025-03-17 NOTE — PROGRESS NOTES
Daily Note     Today's date: 3/17/2025  Patient name: Tian Garcia  : 1950  MRN: [id number]  Referring provider: Ronnie Thorpe, PT  Dx:   Encounter Diagnosis     ICD-10-CM    1. Chronic right shoulder pain  M25.511     G89.29                      Subjective:   Current Status: feeling good today - minimal pain currently - had soreness 2-3 days following LV - attributed to yard work not PT session  New Symptoms/ Problems: NA  Response to Last Treatment: no adverse reaction to LV  HEP/ Activity Recommendations:  able to perform some exercises  Progress Towards Goals: continues to have difficulty with repetitive movements and anything over shoulder height    Objective: See treatment diary below      Assessment:   Stage: Chronic   Etiology: disuse  Stability of Symptoms:  At Evaluation: Stable - unchanged  Global Rating of Change:   Symptom Irritability Level: Moderate  Primary Movement System Diagnosis: Hypomobility  Primary Pain Phenotype: Nociceptive  Patient Specific Functional Scale:   3/6/25: get dressed comfortably 0/10, return to lifting without increased pain 0/10, learn management strategies 0/10; Total 0/30   Patient Acceptable Symptom State: unacceptable  FOTO Prediction: 61 by visit 11  FOTO Progress: 45 at evaluation   Greatest Concern: persistent pain   Current Activity Plan: added to HEP (3/6)  Current Educational Needs: appropriate activity modifications, appropriate activity progressions, timeframe for recovery, next steps to take to progress towards goals    Patient had had good tolerance to manual treatment - notes less pain and better mobility following.  He demonstrated good exercise recall and form throughout treatment.  He noted fatigue but no increased pain post treatment.  Skilled PT continues to be required to guide progression of mobility and control to allow him to return to dressing comfortably and lifting without pain.       Plan: Continue with POC.  Monitor tolerance to last  "treatment and activity recommendations.  Follow up with me on 3/24/25.  Continue progressing shoulder mobility with manual treatment and stretching.          Daily Treatment Diary     DX: (R) Shoulder pain   Follow Up with Referring Provider:   Precautions: NA  CO-MORBIDITIES:  Hx of neck fusion, (B) TKA, A-fib, CHF   HEP ACCESS CODE: [id number]       POC Expires Reeval for Medicare to be completed Unit Limit Auth Expiration Date PT/OT/STVisit Limit   5/1/25 By visit NA  NA 12/31/25 BOMN    Completed on visit                        Auth Status DATE 3/10 3/17       NA Visit # 2 3        Remaining         MANUAL THERAPY         (R) Shoulder Mobs Post, Inf  Gr 2/3  4 min Post, Inf  Gr 2/3  4 min       (R) Shoulder PROM Flex, ABD  4 min Flex, ABD, ER at 45  4 min                                           THERAPEUTIC EXERCISE HEP         UBE  3'/3' 3'/3'                 Pendulums 3/6 CW/CCW  20x ea CW/CCW  20x ea       Pulley Stretches  Flex  Scaption  ABD  5\"x10 ea 5\"x10 ea       Mod Prone Sh' Row 3/6 3# 20x 5# 20x       Mod Prone Sh' Ext 3/6 2# 20x 3# 20x       Mod Prone Sh' ABD 3/6 0# 20x 0# 20x                 Supine Sh' AAROM Flex  Min A  20x Ind  15x       S/L Sh' AAROM ABD  Min A  20x Ind  15x       S/L Sh' AROM ER  0#  20x 0#   20x                                     Bicep Curls                               NEUROMUSCULAR REEDUCATION           Ball Stabilization at Wall  Flex/ Ext  H'ABD/ADD  10xea Flex, Ext  H'ABD/ADD  10x ea                           TB Sh' Row          TB Sh' Ext          TB Sh' ER          TB Sh' IR                                                                                THERAPEUTIC ACTIVITY                                                  GAIT TRAINING                                                  MODALITIES                                         "

## 2025-03-24 ENCOUNTER — OFFICE VISIT (OUTPATIENT)
Dept: PHYSICAL THERAPY | Facility: CLINIC | Age: 75
End: 2025-03-24
Payer: COMMERCIAL

## 2025-03-24 DIAGNOSIS — G89.29 CHRONIC RIGHT SHOULDER PAIN: Primary | ICD-10-CM

## 2025-03-24 DIAGNOSIS — M25.511 CHRONIC RIGHT SHOULDER PAIN: Primary | ICD-10-CM

## 2025-03-24 PROCEDURE — 97112 NEUROMUSCULAR REEDUCATION: CPT | Performed by: PHYSICAL THERAPIST

## 2025-03-24 PROCEDURE — 97110 THERAPEUTIC EXERCISES: CPT | Performed by: PHYSICAL THERAPIST

## 2025-03-24 NOTE — PROGRESS NOTES
Daily Note     Today's date: 3/24/2025  Patient name: Tian Garcia  : 1950  MRN: [id number]  Referring provider: Ronnie Thorpe, PT  Dx:   Encounter Diagnosis     ICD-10-CM    1. Chronic right shoulder pain  M25.511     G89.29                      Subjective:   Current Status: feeling ok today - minimal discomfort - over the weekend he had difficulty with throwing - unable to with (R) arm to play with grandson.   New Symptoms/ Problems: NA  Response to Last Treatment: soreness lasting 2 days  HEP/ Activity Recommendations:  able to perform some exercises  Progress Towards Goals: continues to have difficulty with repetitive movements and anything over shoulder height    Objective: See treatment diary below      Assessment:   Stage: Chronic   Etiology: disuse  Stability of Symptoms:  At Evaluation: Stable - unchanged  Global Rating of Change:   Symptom Irritability Level: Moderate  Primary Movement System Diagnosis: Hypomobility  Primary Pain Phenotype: Nociceptive  Patient Specific Functional Scale:   3/6/25: get dressed comfortably 0/10, return to lifting without increased pain 0/10, learn management strategies 0/10; Total 0/30   Patient Acceptable Symptom State: unacceptable  FOTO Prediction: 61 by visit 11  FOTO Progress: 45 at evaluation   Greatest Concern: persistent pain   Current Activity Plan: added to HEP (3/6)  Current Educational Needs: appropriate activity modifications, appropriate activity progressions, timeframe for recovery, next steps to take to progress towards goals    Patient had good tolerance to manual treatment - had less pain following.  He was able to perform exercises with good control today.  Exercises were not progressed secondary to response to LV.   Skilled PT continues to be required to guide progression of mobility and control to allow him to return to dressing comfortably and lifting without pain.       Plan: Continue with POC.  Monitor tolerance to last treatment and  "activity recommendations.  Follow up with me on 3/27/25.  Continue progressing shoulder mobility with manual treatment and stretching.          Daily Treatment Diary     DX: (R) Shoulder pain   Follow Up with Referring Provider:   Precautions: NA  CO-MORBIDITIES:  Hx of neck fusion, (B) TKA, A-fib, CHF   HEP ACCESS CODE: [id number]       POC Expires Reeval for Medicare to be completed Unit Limit Auth Expiration Date PT/OT/STVisit Limit   5/1/25 By visit NA  NA 12/31/25 BOMN    Completed on visit                        Auth Status DATE 3/10 3/17 3/24      NA Visit # 2 3 4       Remaining         MANUAL THERAPY         (R) Shoulder Mobs Post, Inf  Gr 2/3  4 min Post, Inf  Gr 2/3  4 min Post, Inf  Gr 2/3  4 min      (R) Shoulder PROM Flex, ABD  4 min Flex, ABD, ER at 45  4 min Flex, ABD, ER at 45  4 min                                          THERAPEUTIC EXERCISE HEP         UBE  3'/3' 3'/3' 3'/3'                Pendulums 3/6 CW/CCW  20x ea CW/CCW  20x ea CW/ CCW  20x ea      Pulley Stretches  Flex  Scaption  ABD  5\"x10 ea 5\"x10 ea 5\"x10 ea      Mod Prone Sh' Row 3/6 3# 20x 5# 20x 5# 20x      Mod Prone Sh' Ext 3/6 2# 20x 3# 20x 3# 20x      Mod Prone Sh' ABD 3/6 0# 20x 0# 20x 1# 20x                Supine Sh' AAROM Flex  Min A  20x Ind  15x Ind  20x      S/L Sh' AAROM ABD  Min A  20x Ind  15x Ind  20x      S/L Sh' AROM ER  0#  20x 0#   20x 0#   20x                                    Bicep Curls                               NEUROMUSCULAR REEDUCATION           Ball Stabilization at Wall  Flex/ Ext  H'ABD/ADD  10xea Flex, Ext  H'ABD/ADD  10x ea Flex, Ext  H'ABD, ADD  10x ea                          TB Sh' Row          TB Sh' Ext          TB Sh' ER          TB Sh' IR                                                                                THERAPEUTIC ACTIVITY                                                  GAIT TRAINING                                                  MODALITIES                                       "

## 2025-03-27 ENCOUNTER — HOSPITAL ENCOUNTER (OUTPATIENT)
Dept: MRI IMAGING | Facility: HOSPITAL | Age: 75
End: 2025-03-27
Attending: INTERNAL MEDICINE
Payer: COMMERCIAL

## 2025-03-27 ENCOUNTER — OFFICE VISIT (OUTPATIENT)
Dept: PHYSICAL THERAPY | Facility: CLINIC | Age: 75
End: 2025-03-27
Payer: COMMERCIAL

## 2025-03-27 DIAGNOSIS — M79.652 MUSCULOSKELETAL THIGH PAIN, LEFT: ICD-10-CM

## 2025-03-27 DIAGNOSIS — G89.29 CHRONIC RIGHT SHOULDER PAIN: Primary | ICD-10-CM

## 2025-03-27 DIAGNOSIS — M25.511 CHRONIC RIGHT SHOULDER PAIN: Primary | ICD-10-CM

## 2025-03-27 PROCEDURE — 97110 THERAPEUTIC EXERCISES: CPT | Performed by: PHYSICAL THERAPIST

## 2025-03-27 PROCEDURE — 73721 MRI JNT OF LWR EXTRE W/O DYE: CPT

## 2025-03-27 PROCEDURE — 97112 NEUROMUSCULAR REEDUCATION: CPT | Performed by: PHYSICAL THERAPIST

## 2025-03-27 NOTE — PROGRESS NOTES
Daily Note     Today's date: 3/27/2025  Patient name: Tian Garcia  : 1950  MRN: [id number]  Referring provider: Ronnie Thorpe, PT  Dx:   Encounter Diagnosis     ICD-10-CM    1. Chronic right shoulder pain  M25.511     G89.29                      Subjective:   Current Status: feeling sore today - attempted some new exercises - standing abduction circles on Tuesday - has been having pain since   New Symptoms/ Problems: NA  Response to Last Treatment: no adverse reaction - felt great on Tuesday  HEP/ Activity Recommendations:  able to perform some exercises  Progress Towards Goals: continues to have difficulty with repetitive movements and anything over shoulder height    Objective: See treatment diary below      Assessment:   Stage: Chronic   Etiology: disuse  Stability of Symptoms:  At Evaluation: Stable - unchanged  Global Rating of Change:   Symptom Irritability Level: Moderate  Primary Movement System Diagnosis: Hypomobility  Primary Pain Phenotype: Nociceptive  Patient Specific Functional Scale:   3/6/25: get dressed comfortably 0/10, return to lifting without increased pain 0/10, learn management strategies 0/10; Total 0/30   Patient Acceptable Symptom State: unacceptable  FOTO Prediction: 61 by visit 11  FOTO Progress: 45 at evaluation   Greatest Concern: persistent pain   Current Activity Plan: added to HEP (3/6)  Current Educational Needs: appropriate activity modifications, appropriate activity progressions, timeframe for recovery, next steps to take to progress towards goals    Patient had good tolerance to manual treatment - continues to have less pain following.  He was advised to stick with current HEP - had good understanding of how the exercises he attempted were a large jump in difficulty from what he is currently tolerating well.  He had less pain post treatment.  Skilled PT continues to be required to guide progression of mobility and control to allow him to return to dressing  "comfortably and lifting without pain.       Plan: Continue with POC.  Monitor tolerance to last treatment and activity recommendations.  Follow up with me on 3/31/25.  Continue progressing shoulder mobility with manual treatment and stretching.          Daily Treatment Diary     DX: (R) Shoulder pain   Follow Up with Referring Provider:   Precautions: NA  CO-MORBIDITIES:  Hx of neck fusion, (B) TKA, A-fib, CHF   HEP ACCESS CODE: [id number]       POC Expires Reeval for Medicare to be completed Unit Limit Auth Expiration Date PT/OT/STVisit Limit   5/1/25 By visit NA  NA 12/31/25 BOMN    Completed on visit                        Auth Status DATE 3/10 3/17 3/24 3/27     NA Visit # 2 3 4 5      Remaining         MANUAL THERAPY         (R) Shoulder Mobs Post, Inf  Gr 2/3  4 min Post, Inf  Gr 2/3  4 min Post, Inf  Gr 2/3  4 min Post, Inf  Gr 2/3  4 min     (R) Shoulder PROM Flex, ABD  4 min Flex, ABD, ER at 45  4 min Flex, ABD, ER at 45  4 min Flex, ABD, ER at 45  4 min                                         THERAPEUTIC EXERCISE HEP         UBE  3'/3' 3'/3' 3'/3' 3'/3'               Pendulums 3/6 CW/CCW  20x ea CW/CCW  20x ea CW/ CCW  20x ea CW/CCW  20x ea     Pulley Stretches  Flex  Scaption  ABD  5\"x10 ea 5\"x10 ea 5\"x10 ea 5\"x10 ea     Mod Prone Sh' Row 3/6 3# 20x 5# 20x 5# 20x 5# 20x     Mod Prone Sh' Ext 3/6 2# 20x 3# 20x 3# 20x 3# 20x     Mod Prone Sh' ABD 3/6 0# 20x 0# 20x 1# 20x 1# 20x               Supine Sh' AAROM Flex  Min A  20x Ind  15x Ind  20x Ind  20x     S/L Sh' AAROM ABD  Min A  20x Ind  15x Ind  20x Ind  20x     S/L Sh' AROM ER  0#  20x 0#   20x 0#   20x 0#   20x                                   Bicep Curls                               NEUROMUSCULAR REEDUCATION           Ball Stabilization at Wall  Flex/ Ext  H'ABD/ADD  10xea Flex, Ext  H'ABD/ADD  10x ea Flex, Ext  H'ABD, ADD  10x ea Flex,Ext  H'ABD/ADD  20x ea                         TB Sh' Row          TB Sh' Ext          TB Sh' ER          TB Sh' IR   "                                                                              THERAPEUTIC ACTIVITY                                                  GAIT TRAINING                                                  MODALITIES

## 2025-03-31 ENCOUNTER — OFFICE VISIT (OUTPATIENT)
Dept: PHYSICAL THERAPY | Facility: CLINIC | Age: 75
End: 2025-03-31
Payer: COMMERCIAL

## 2025-03-31 DIAGNOSIS — G89.29 CHRONIC RIGHT SHOULDER PAIN: Primary | ICD-10-CM

## 2025-03-31 DIAGNOSIS — M25.511 CHRONIC RIGHT SHOULDER PAIN: Primary | ICD-10-CM

## 2025-03-31 PROCEDURE — 97112 NEUROMUSCULAR REEDUCATION: CPT | Performed by: PHYSICAL THERAPIST

## 2025-03-31 PROCEDURE — 97110 THERAPEUTIC EXERCISES: CPT | Performed by: PHYSICAL THERAPIST

## 2025-03-31 NOTE — PROGRESS NOTES
Daily Note     Today's date: 3/31/2025  Patient name: Tian Garcia  : 1950  MRN: [id number]  Referring provider: Ronnie Thorpe, PT  Dx:   Encounter Diagnosis     ICD-10-CM    1. Chronic right shoulder pain  M25.511     G89.29                      Subjective:   Current Status: feeling sore today - did yard work over the weekend   New Symptoms/ Problems: NA  Response to Last Treatment: no adverse reaction - felt great on Tuesday  HEP/ Activity Recommendations:  able to perform some exercises  Progress Towards Goals: continues to have difficulty with repetitive movements and anything over shoulder height    Objective: See treatment diary below      Assessment:   Stage: Chronic   Etiology: disuse  Stability of Symptoms:  At Evaluation: Stable - unchanged  Global Rating of Change:   Symptom Irritability Level: Moderate  Primary Movement System Diagnosis: Hypomobility  Primary Pain Phenotype: Nociceptive  Patient Specific Functional Scale:   3/6/25: get dressed comfortably 0/10, return to lifting without increased pain 0/10, learn management strategies 0/10; Total 0/30   Patient Acceptable Symptom State: unacceptable  FOTO Prediction: 61 by visit 11  FOTO Progress: 45 at evaluation   Greatest Concern: persistent pain   Current Activity Plan: added to HEP (3/6)  Current Educational Needs: appropriate activity modifications, appropriate activity progressions, timeframe for recovery, next steps to take to progress towards goals    Patient had good tolerance to manual treatment - improved pain levels following.  He was able to perform progressions with standing shoulder raises.  He noted slight decrease in pain post treatment.  Skilled PT continues to be required to guide progression of mobility and control to allow him to return to dressing comfortably and lifting without pain.       Plan: Continue with POC.  Monitor tolerance to last treatment and activity recommendations.  Follow up with me on 4/3/25.  Continue  "progressing shoulder mobility with manual treatment and stretching.          Daily Treatment Diary     DX: (R) Shoulder pain   Follow Up with Referring Provider:   Precautions: NA  CO-MORBIDITIES:  Hx of neck fusion, (B) TKA, A-fib, CHF   HEP ACCESS CODE: [id number]       POC Expires Reeval for Medicare to be completed Unit Limit Auth Expiration Date PT/OT/STVisit Limit   5/1/25 By visit NA  NA 12/31/25 BOMN    Completed on visit                        Auth Status DATE 3/24 3/27 3/31      NA Visit # 4 5 6       Remaining         MANUAL THERAPY         (R) Shoulder Mobs Post, Inf  Gr 2/3  4 min Post, Inf  Gr 2/3  4 min Post, Inf  Gr 2/3  4 min      (R) Shoulder PROM Flex, ABD, ER at 45  4 min Flex, ABD, ER at 45  4 min Flex, ABD, ER at 45   4 min                                          THERAPEUTIC EXERCISE HEP         UBE  3'/3' 3'/3' 3'/3'                Pendulums 3/6 CW/ CCW  20x ea CW/CCW  20x ea CW/CCW  20x ea      Pulley Stretches  Flex  Scaption  ABD  5\"x10 ea 5\"x10 ea 5\"x10 ea      Mod Prone Sh' Row 3/6 5# 20x 5# 20x 5# 20x      Mod Prone Sh' Ext 3/6 3# 20x 3# 20x 3# 20x      Mod Prone Sh' ABD 3/6 1# 20x 1# 20x 1# 20x      Mod Prone Sh' Y    0# 20x      Supine Sh' AAROM Flex  Ind  20x Ind  20x       S/L Sh' AAROM ABD  Ind  20x Ind  20x       S/L Sh' AROM ER  0#   20x 0#   20x       Standing Sh' Flex     10x      Standing Sh' Scaption    10x      Standing Sh' ABD          Bicep Curls                               NEUROMUSCULAR REEDUCATION           Ball Stabilization at Wall  Flex, Ext  H'ABD, ADD  10x ea Flex,Ext  H'ABD/ADD  20x ea Flex,Ext  H'ABD/ADD  20x ea                          TB Sh' Row          TB Sh' Ext          TB Sh' ER          TB Sh' IR                                                                                THERAPEUTIC ACTIVITY                                                  GAIT TRAINING                                                  MODALITIES                                         "

## 2025-04-01 ENCOUNTER — TELEPHONE (OUTPATIENT)
Age: 75
End: 2025-04-01

## 2025-04-01 DIAGNOSIS — I48.0 PAROXYSMAL A-FIB (HCC): Primary | ICD-10-CM

## 2025-04-01 NOTE — TELEPHONE ENCOUNTER
Spoke with Clinical. Pat will be placing the script in patient 's chart.  Patient is at the [facility].  A

## 2025-04-02 ENCOUNTER — OFFICE VISIT (OUTPATIENT)
Dept: CARDIOLOGY CLINIC | Facility: CLINIC | Age: 75
End: 2025-04-02
Payer: COMMERCIAL

## 2025-04-02 ENCOUNTER — RESULTS FOLLOW-UP (OUTPATIENT)
Dept: FAMILY MEDICINE CLINIC | Facility: HOSPITAL | Age: 75
End: 2025-04-02

## 2025-04-02 ENCOUNTER — ANTICOAG VISIT (OUTPATIENT)
Dept: FAMILY MEDICINE CLINIC | Facility: HOSPITAL | Age: 75
End: 2025-04-02

## 2025-04-02 VITALS
WEIGHT: 251 LBS | HEART RATE: 61 BPM | HEIGHT: 72 IN | SYSTOLIC BLOOD PRESSURE: 122 MMHG | BODY MASS INDEX: 34 KG/M2 | DIASTOLIC BLOOD PRESSURE: 62 MMHG

## 2025-04-02 DIAGNOSIS — R51.9 INTRACTABLE HEADACHE, UNSPECIFIED CHRONICITY PATTERN, UNSPECIFIED HEADACHE TYPE: ICD-10-CM

## 2025-04-02 DIAGNOSIS — I48.0 PAROXYSMAL A-FIB (HCC): Primary | ICD-10-CM

## 2025-04-02 DIAGNOSIS — I48.0 PAROXYSMAL A-FIB (HCC): ICD-10-CM

## 2025-04-02 DIAGNOSIS — I48.91 ATRIAL FIBRILLATION, UNSPECIFIED TYPE (HCC): ICD-10-CM

## 2025-04-02 DIAGNOSIS — R73.01 IFG (IMPAIRED FASTING GLUCOSE): ICD-10-CM

## 2025-04-02 LAB
BASOPHILS # BLD AUTO: 0 X10E3/UL (ref 0–0.2)
BASOPHILS NFR BLD AUTO: 1 %
EOSINOPHIL # BLD AUTO: 0.1 X10E3/UL (ref 0–0.4)
EOSINOPHIL NFR BLD AUTO: 2 %
ERYTHROCYTE [DISTWIDTH] IN BLOOD BY AUTOMATED COUNT: 13.7 % (ref 11.6–15.4)
HCT VFR BLD AUTO: 39.3 % (ref 37.5–51)
HGB BLD-MCNC: 12.4 G/DL (ref 13–17.7)
IMM GRANULOCYTES # BLD: 0 X10E3/UL (ref 0–0.1)
IMM GRANULOCYTES NFR BLD: 0 %
INR PPP: 1.8 (ref 0.9–1.2)
LYMPHOCYTES # BLD AUTO: 1 X10E3/UL (ref 0.7–3.1)
LYMPHOCYTES NFR BLD AUTO: 23 %
MCH RBC QN AUTO: 29.1 PG (ref 26.6–33)
MCHC RBC AUTO-ENTMCNC: 31.6 G/DL (ref 31.5–35.7)
MCV RBC AUTO: 92 FL (ref 79–97)
MONOCYTES # BLD AUTO: 0.4 X10E3/UL (ref 0.1–0.9)
MONOCYTES NFR BLD AUTO: 9 %
NEUTROPHILS # BLD AUTO: 2.8 X10E3/UL (ref 1.4–7)
NEUTROPHILS NFR BLD AUTO: 65 %
PLATELET # BLD AUTO: 224 X10E3/UL (ref 150–450)
PROTHROMBIN TIME: 18.4 SEC (ref 9.1–12)
PTH-INTACT SERPL-MCNC: 66 PG/ML (ref 15–65)
RBC # BLD AUTO: 4.26 X10E6/UL (ref 4.14–5.8)
WBC # BLD AUTO: 4.4 X10E3/UL (ref 3.4–10.8)

## 2025-04-02 PROCEDURE — 99215 OFFICE O/P EST HI 40 MIN: CPT | Performed by: INTERNAL MEDICINE

## 2025-04-02 PROCEDURE — 93000 ELECTROCARDIOGRAM COMPLETE: CPT | Performed by: INTERNAL MEDICINE

## 2025-04-02 NOTE — PROGRESS NOTES
EPS Follow-up Patient Evaluation - Tian Garcia 75 y.o. male MRN: [id number]       Referring:Dr. Lock    CC/HPI:   It was a pleasure to see Tian Garcia in our arrhythmia clinic at [facility]. As you know he is a 75 y.o. man with WILL on CPAP, arthirits, factor V Leiden, DVT and paorxysmal atrial fibrillation who presented 12/4/24 to discuss management of atrial fibrillation.     Patient had diagnosis of atrial fibrillation that had been paroxysmal in the past.  He reported having ablation done in the past though chart review did not show any procedure report.  He had cardioversions in the past as well.  More recently after he is second knee procedure he had been having increased burden of atrial fibrillation.  He has remained in atrial fibrillation over the past several days with higher heart rate.  His metoprolol dose was initially increased in the hospital for RVR however it was reduced and follow-up due to patient having dizziness.    He felt fatigue but per wife, does not indorse much symptoms normally. He denied any significant swelling in legs.     Office visit 1/21/2025:    After previous visit, Tian under PVI, posterior wall isolation, mitral flutter ablation on 1/2/24. He had CTI line placed previously and it had bidirectional block across it.     He stopped taking amiodarone once he ran out.     He reported feeling well since the ablation. Denies palpitations. He is wearing zio monitor.     Interval history:  Tian now presents for a follow-up visit.  He had echocardiogram performed on March 4, 2025 showing ejection fraction improvement to 50-55%.  His aortic stenosis was noted to be moderate.  He is maintained on Coumadin and metoprolol.    He has frequent falls due to his arthiritis. He also has significant bruising. He would prefer to come off AC.     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, 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, on steroids for prolonged period    Ambulatory dysfunction     cane use    Anemia     BPH (benign prostatic hyperplasia)     CHF (congestive heart failure) ([facility])     Chronic kidney disease     stage 3b, baseline Cr 2.2    DDD (degenerative disc disease), cervical     DDD (degenerative disc disease), lumbar     Diverticulosis     Factor V Leiden ([facility])     History of DVT (deep vein thrombosis)     from Facotr V, on Coumadin    [place] (hard of hearing)     Hyperlipidemia     Hypertension     Insomnia     Ischemic cardiomyopathy     EF 33%    MRSA carrier     Neuropathy     Obesity     WILL on CPAP     Osteoarthritis     Other acute osteomyelitis, other site ([facility]) 01/03/2023    Paroxysmal atrial fibrillation ([facility])     s/p ablation, s/p DCCV, Coumadin, on Amiodarone for    RBBB     Rotator cuff tear     right    Severe aortic stenosis     Steroid-induced hyperglycemia     requiring insulin, w/ MRSA infection 2022, resolved       Medications:      Current Outpatient Medications:     Aspirin 81 MG CAPS, Take by mouth, Disp: , Rfl:     atorvastatin (LIPITOR) 80 mg tablet, TAKE 1 TABLET BY MOUTH EVERY EVENING, Disp: 90 tablet, Rfl: 1    DULoxetine (CYMBALTA) 60 mg delayed release capsule, TAKE 1 CAPSULE BY MOUTH EVERY DAY, Disp: 90 capsule, Rfl: 1    ezetimibe (ZETIA) 10 mg tablet, TAKE 1 TABLET BY MOUTH EVERY DAY, Disp: 90 tablet, Rfl: 3    gabapentin (NEURONTIN) 300 mg capsule, TAKE 1 CAPSULE BY MOUTH  DAILY AT BEDTIME (Patient taking differently: Pt reports taking bid), Disp: 90 capsule, Rfl: 1    glucose blood (Accu-Chek Guide) test strip, Use one new strip daily dx r73.01, Disp: 100 strip, Rfl: 1    lisinopril (ZESTRIL) 5 mg tablet, Take 1 tablet (5 mg total) by mouth daily, Disp: 90 tablet, Rfl: 3    metoprolol succinate (TOPROL-XL) 25 mg 24 hr tablet, Take 1 tablet (25 mg total) by mouth 2 (two) times a day, Disp: 180 tablet, Rfl: 3    mometasone (ELOCON) 0.1 %  cream, APPLY TO AFFECTED AREA TOPICALLY EVERY DAY, Disp: 45 g, Rfl: 1    nitroglycerin (NITROSTAT) 0.4 mg SL tablet, Place 1 tablet (0.4 mg total) under the tongue every 5 (five) minutes as needed for chest pain, Disp: 30 tablet, Rfl: 0    potassium chloride (Klor-Con M20) 20 mEq tablet, Take 1 tablet (20 mEq total) by mouth daily, Disp: 90 tablet, Rfl: 3    tamsulosin (FLOMAX) 0.4 mg, TAKE 1 CAPSULE BY MOUTH EVERY DAY WITH DINNER, Disp: 90 capsule, Rfl: 1    torsemide (DEMADEX) 20 mg tablet, Take 1 tablet (20 mg total) by mouth daily, Disp: 90 tablet, Rfl: 3    warfarin (COUMADIN) 5 mg tablet, TAKE ONE-HALF TABLET BY MOUTH  DAILY , EXCEPT TAKE 1 TABLET BY  MOUTH ON WEDNESDAY AND SATURDAY, Disp: 58 tablet, Rfl: 1    zolpidem (AMBIEN) 10 mg tablet, Take 1 tablet (10 mg total) by mouth daily at bedtime as needed for sleep, Disp: 90 tablet, Rfl: 0     Family History   Problem Relation Age of Onset    Lung cancer Mother     Hearing loss Father     Emphysema Sister     Heart attack Brother     Atrial fibrillation Brother     Heart failure Brother     Other (atrial fib) Brother     Clotting disorder Son      Social History     Socioeconomic History    Marital status: /Civil Union     Spouse name: Elsy    Number of children: 4    Years of education: Not on file    Highest education level: Not on file   Occupational History    Occupation: Retired   Tobacco Use    Smoking status: Never    Smokeless tobacco: Never   Vaping Use    Vaping status: Never Used   Substance and Sexual Activity    Alcohol use: Not Currently    Drug use: No    Sexual activity: Not Currently     Partners: Female   Other Topics Concern    Not on file   Social History Narrative    Caffeine use: 2 cups coffee a day     Social Drivers of Health     Financial Resource Strain: Medium Risk (2/7/2024)    Overall Financial Resource Strain (CARDIA)     Difficulty of Paying Living Expenses: Somewhat hard   Food Insecurity: No Food Insecurity (3/6/2025)     Hunger Vital Sign     Worried About Running Out of Food in the Last Year: Never true     Ran Out of Food in the Last Year: Never true   Transportation Needs: No Transportation Needs (3/6/2025)    PRAPARE - Transportation     Lack of Transportation (Medical): No     Lack of Transportation (Non-Medical): No   Physical Activity: Not on file   Stress: Not on file   Social Connections: Not on file   Intimate Partner Violence: Not on file   Housing Stability: Low Risk  (3/6/2025)    Housing Stability Vital Sign     Unable to Pay for Housing in the Last Year: No     Number of Times Moved in the Last Year: 0     Homeless in the Last Year: No     Social History     Tobacco Use   Smoking Status Never   Smokeless Tobacco Never     Social History     Substance and Sexual Activity   Alcohol Use Not Currently       Review of Systems   Constitutional: Negative for chills, fever and malaise/fatigue.   HENT: Negative.     Eyes:  Negative for blurred vision and double vision.   Cardiovascular:  Positive for leg swelling. Negative for chest pain, dyspnea on exertion, near-syncope, orthopnea, palpitations, paroxysmal nocturnal dyspnea and syncope.   Respiratory:  Negative for cough and sputum production.    Endocrine: Negative.    Hematologic/Lymphatic: Bruises/bleeds easily.   Skin: Negative.  Negative for rash.   Musculoskeletal:  Positive for falls. Negative for arthritis and joint pain.   Gastrointestinal:  Negative for abdominal pain, nausea and vomiting.   Genitourinary: Negative.    Neurological: Negative.  Negative for dizziness and light-headedness.   Psychiatric/Behavioral: Negative.  The patient is not nervous/anxious.         Objective:     Vitals: Blood pressure 122/62, pulse 61, height 6' (1.829 m), weight 114 kg (251 lb)., Body mass index is 34.04 kg/m².,        Physical Exam:    GEN: Tian KARLOS Radha appears well, alert and oriented x 3, pleasant and cooperative   HEENT: pupils equal, round, and reactive to light;  extraocular muscles intact  NECK: supple, no carotid bruits   HEART: regular rate and rhythm, normal S1 and S2, no murmurs, clicks, gallops or rubs   LUNGS: clear to auscultation bilaterally; no wheezes, rales, or rhonchi   ABDOMEN: normal bowel sounds, soft, no tenderness, no distention  EXTREMITIES: peripheral pulses normal; no clubbing, cyanosis, or edema  NEURO: no focal findings   SKIN: normal without suspicious lesions on exposed skin      Labs & Results:  Below is the patient's most recent value for Albumin, ALT, AST, BUN, Calcium, Chloride, Cholesterol, CO2, Creatinine, GFR, Glucose, HDL, Hematocrit, Hemoglobin, Hemoglobin A1C, LDL, Magnesium, Phosphorus, Platelets, Potassium, PSA, Sodium, Triglycerides, and WBC.   Lab Results   Component Value Date    ALT 17 12/27/2024    AST 16 12/27/2024    BUN 30 (H) 01/03/2025    CALCIUM 8.9 01/03/2025     01/03/2025    CHOL 151 10/25/2017    CO2 26 01/03/2025    CREATININE 1.95 (H) 01/03/2025    HDL 58 11/12/2024    HCT 39.3 04/01/2025    HGB 12.4 (L) 04/01/2025    HGBA1C 6.2 (H) 04/09/2023    MG 2.2 04/18/2023    PHOS 3.6 05/22/2023     04/01/2025    K 5.0 01/03/2025    PSA 1.2 01/09/2023     10/25/2017    TRIG 72 11/12/2024    WBC 4.4 04/01/2025     Note: for a comprehensive list of the patient's lab results, access the Results Review activity.          Cardiac testing:     I personally reviewed the ECG performed in the clinic on 04/02/25. It reveals sinus rhythm with right bundle branch block, QRS of 140 ms.    Echocardiograms:  No results found for this or any previous visit.    No results found for this or any previous visit.      Catheterizations:   No results found for this or any previous visit.      Stress Tests:  No results found for this or any previous visit.      Holter monitor -   No results found for this or any previous visit.    No results found for this or any previous visit.        ASSESSMENT/PLAN:  Paroxysmal atrial  fibrillation  Patient had episode of atrial fibrillation with RVR post TKA surgery  Metoprolol succinate dose was increased however he could not tolerated  Dose was reduced to 25 mg daily  Currently maintained on warfarin for anticoagulation  We discussed options of controlling rhythm with either antiarrhythmic therapy versus ablation procedure given asymptomatic nature of atrial fibrillation and cardiomyopathy  After answering all the question he opted to proceed with ablation procedure  He will benefit from further rate control though his sinus rate will get slow  Started amiodarone 200 mg twice daily  x 2 weeks then 200 mg daily (INR has been therapeutic in the past month)  Check INR twice weekly  Reduce metoprolol to 25 mg once daily in one week  S/p PVI, posterior wall isolation and anterior mitral flutter line placement  Remains in normal rhythm  Amiodarone discontinued  Repeat echo on 3/4/2025 showed ejection fraction of 50-55%  Cardiac event monitor showed no significant arrhythmias  Maintained on Coumadin, metoprolol  Given fall risk and significant bruising we discussed Watchman procedure. He will think about it and let us know.   However he has hx of DVT - so will have to continue AC.   Severe aortic stenosis  Noted on recent ECHO  Though not high on gradient  Could be due to decrease flow  Dobutamine stress ECHO confirmed psuedo AS  Recent echocardiogram on March 4, 2025 showed ejection fraction improved to 50-55% and aortic valve was moderately stenosed with mean gradient of 21 mmHg  CAD  Patient had ST elevation MI in April 2023  Underwent PCI to proximal to mid LAD  Maintained on aspirin, atorvastatin and Toprol-XL  Denies angina  Ischemic cardiomyopathy  Echocardiogram had shown ejection fraction of 37% but most recently echocardiogram showed an ejection fraction of 33% as he was in atrial fibrillation at the time  Currently on goal-directed medical therapy  Echo was repeated on March 4, 2025 showing  ejection fraction at 50-55%  No need for defibrillator  Hold lisinopril if SBP is less than 100 mmHg  DVT/factor V leiden def  Maintained on AC

## 2025-04-03 ENCOUNTER — OFFICE VISIT (OUTPATIENT)
Dept: PHYSICAL THERAPY | Facility: CLINIC | Age: 75
End: 2025-04-03
Payer: COMMERCIAL

## 2025-04-03 ENCOUNTER — TELEPHONE (OUTPATIENT)
Dept: NON INVASIVE DIAGNOSTICS | Facility: CLINIC | Age: 75
End: 2025-04-03

## 2025-04-03 DIAGNOSIS — G89.29 CHRONIC RIGHT SHOULDER PAIN: Primary | ICD-10-CM

## 2025-04-03 DIAGNOSIS — M25.511 CHRONIC RIGHT SHOULDER PAIN: Primary | ICD-10-CM

## 2025-04-03 LAB
ALBUMIN/CREAT UR: <19 MG/G CREAT (ref 0–29)
APPEARANCE UR: CLEAR
BACTERIA URNS QL MICRO: NORMAL
BILIRUB UR QL STRIP: NEGATIVE
BUN SERPL-MCNC: 35 MG/DL (ref 8–27)
BUN/CREAT SERPL: 19 (ref 10–24)
CALCIUM SERPL-MCNC: 9.7 MG/DL (ref 8.6–10.2)
CASTS URNS QL MICRO: NORMAL /LPF
CHLORIDE SERPL-SCNC: 100 MMOL/L (ref 96–106)
CO2 SERPL-SCNC: 26 MMOL/L (ref 20–29)
COLOR UR: YELLOW
CREAT SERPL-MCNC: 1.83 MG/DL (ref 0.76–1.27)
CREAT UR-MCNC: 15.8 MG/DL
EGFR: 38 ML/MIN/1.73
EPI CELLS #/AREA URNS HPF: NORMAL /HPF (ref 0–10)
GLUCOSE SERPL-MCNC: 121 MG/DL (ref 70–99)
GLUCOSE UR QL: NEGATIVE
HGB UR QL STRIP: NEGATIVE
KETONES UR QL STRIP: NEGATIVE
LEUKOCYTE ESTERASE UR QL STRIP: NEGATIVE
MICRO URNS: NORMAL
MICRO URNS: NORMAL
MICROALBUMIN UR-MCNC: <3 UG/ML
NITRITE UR QL STRIP: NEGATIVE
PH UR STRIP: 7 [PH] (ref 5–7.5)
POTASSIUM SERPL-SCNC: 4.4 MMOL/L (ref 3.5–5.2)
PROT UR QL STRIP: NEGATIVE
PROT UR-MCNC: 5.3 MG/DL
PROT/CREAT UR: 335 MG/G CREAT (ref 0–200)
RBC #/AREA URNS HPF: NORMAL /HPF (ref 0–2)
SODIUM SERPL-SCNC: 141 MMOL/L (ref 134–144)
SP GR UR: 1.01 (ref 1–1.03)
UROBILINOGEN UR STRIP-ACNC: 0.2 MG/DL (ref 0.2–1)
WBC #/AREA URNS HPF: NORMAL /HPF (ref 0–5)

## 2025-04-03 PROCEDURE — 97110 THERAPEUTIC EXERCISES: CPT | Performed by: PHYSICAL THERAPIST

## 2025-04-03 PROCEDURE — 97112 NEUROMUSCULAR REEDUCATION: CPT | Performed by: PHYSICAL THERAPIST

## 2025-04-03 RX ORDER — WARFARIN SODIUM 5 MG/1
TABLET ORAL
Qty: 58 TABLET | Refills: 1 | Status: SHIPPED | OUTPATIENT
Start: 2025-04-03

## 2025-04-03 RX ORDER — BLOOD SUGAR DIAGNOSTIC
STRIP MISCELLANEOUS DAILY
Qty: 100 STRIP | Refills: 1 | Status: SHIPPED | OUTPATIENT
Start: 2025-04-03

## 2025-04-03 RX ORDER — GABAPENTIN 300 MG/1
300 CAPSULE ORAL
Qty: 90 CAPSULE | Refills: 1 | Status: SHIPPED | OUTPATIENT
Start: 2025-04-03

## 2025-04-03 NOTE — PROGRESS NOTES
Daily Note     Today's date: 4/3/2025  Patient name: Tian Garcia  : 1950  MRN: [id number]  Referring provider: Ronnie Thorpe, PT  Dx:   Encounter Diagnosis     ICD-10-CM    1. Chronic right shoulder pain  M25.511     G89.29                      Subjective:   Current Status: feeling a little sore today - overall tolerating more activity throughout the week  New Symptoms/ Problems: NA  Response to Last Treatment: no adverse reaction - felt great on Tuesday  HEP/ Activity Recommendations:  able to perform some exercises  Progress Towards Goals: continues to have difficulty with repetitive movements and anything over shoulder height    Objective: See treatment diary below      Assessment:   Stage: Chronic   Etiology: disuse  Stability of Symptoms:  At Evaluation: Stable - unchanged  Global Rating of Change:   Symptom Irritability Level: Moderate  Primary Movement System Diagnosis: Hypomobility  Primary Pain Phenotype: Nociceptive  Patient Specific Functional Scale:   3/6/25: get dressed comfortably 0/10, return to lifting without increased pain 0/10, learn management strategies 0/10; Total 0/30   Patient Acceptable Symptom State: unacceptable  FOTO Prediction: 61 by visit 11  FOTO Progress: 45 at evaluation   Greatest Concern: persistent pain   Current Activity Plan: added to HEP (3/6)  Current Educational Needs: appropriate activity modifications, appropriate activity progressions, timeframe for recovery, next steps to take to progress towards goals    Patient continues to respond well to manual treatment.  He noted fatigue with progressions but no significant increase in pain.  He is making steady progress with PT.  Skilled PT continues to be required to guide progression of mobility and control to allow him to return to dressing comfortably and lifting without pain.       Plan: Continue with POC.  Monitor tolerance to last treatment and activity recommendations.  Follow up with me on 25.  Continue  "progressing shoulder mobility with manual treatment and stretching.          Daily Treatment Diary     DX: (R) Shoulder pain   Follow Up with Referring Provider:   Precautions: NA  CO-MORBIDITIES:  Hx of neck fusion, (B) TKA, A-fib, CHF   HEP ACCESS CODE: [id number]       POC Expires Reeval for Medicare to be completed Unit Limit Auth Expiration Date PT/OT/STVisit Limit   5/1/25 By visit NA  NA 12/31/25 BOMN    Completed on visit                        Auth Status DATE 3/24 3/27 3/31 4/3     NA Visit # 4 5 6       Remaining         MANUAL THERAPY         (R) Shoulder Mobs Post, Inf  Gr 2/3  4 min Post, Inf  Gr 2/3  4 min Post, Inf  Gr 2/3  4 min Post, Inf  Gr 2/3  4 min     (R) Shoulder PROM Flex, ABD, ER at 45  4 min Flex, ABD, ER at 45  4 min Flex, ABD, ER at 45   4 min Flex, ABD, ER at 45  4 min                                         THERAPEUTIC EXERCISE HEP         UBE  3'/3' 3'/3' 3'/3' 3'/3'               Pendulums 3/6 CW/ CCW  20x ea CW/CCW  20x ea CW/CCW  20x ea CW/CCW  20x ea     Pulley Stretches  Flex  Scaption  ABD  5\"x10 ea 5\"x10 ea 5\"x10 ea 5\"x15 ea     Mod Prone Sh' Row 3/6 5# 20x 5# 20x 5# 20x 6# 20x     Mod Prone Sh' Ext 3/6 3# 20x 3# 20x 3# 20x 4# 20x     Mod Prone Sh' ABD 3/6 1# 20x 1# 20x 1# 20x 2# 20x     Mod Prone Sh' Y    0# 20x 0# 20x     Supine Sh' AAROM Flex  Ind  20x Ind  20x       S/L Sh' AAROM ABD  Ind  20x Ind  20x       S/L Sh' AROM ER  0#   20x 0#   20x       Standing Sh' Flex     10x 15x     Standing Sh' Scaption    10x 15x     Standing Sh' ABD     15x     Bicep Curls                               NEUROMUSCULAR REEDUCATION           Ball Stabilization at Wall  Flex, Ext  H'ABD, ADD  10x ea Flex,Ext  H'ABD/ADD  20x ea Flex,Ext  H'ABD/ADD  20x ea Flex,Ext  H'ABD/ADD  CW/CCW  20x ea                         TB Sh' Row          TB Sh' Ext          TB Sh' ER          TB Sh' IR                                                                                THERAPEUTIC ACTIVITY                  "                                 GAIT TRAINING                                                  MODALITIES

## 2025-04-03 NOTE — TELEPHONE ENCOUNTER
----- Message from Derik Amin MD sent at 4/2/2025  5:33 PM EDT -----  Can you guys call his wife and let her know that we won't be able to do Watchman. He has Factor V leiden def and hx of DVT so will have to continue anticoagulation.     Thank you.

## 2025-04-04 ENCOUNTER — TELEPHONE (OUTPATIENT)
Dept: CARDIOLOGY CLINIC | Facility: CLINIC | Age: 75
End: 2025-04-04

## 2025-04-04 NOTE — TELEPHONE ENCOUNTER
Relayed msg as given. Pt verbalized understanding but had another question:    Tian states his 2 sons and a few other people he knows also have FVL and their doctors did not put them on an AC. I suggested it may be because they have never been dx with DVT but said I would confirm with you. Please advise. Thank you.

## 2025-04-04 NOTE — TELEPHONE ENCOUNTER
----- Message from Lidya FLOREZ sent at 4/4/2025  8:25 AM EDT -----    ----- Message -----  From: Derik Amin MD  Sent: 4/3/2025   5:15 PM EDT  To: Lidya Khoury    Thanks But please let him know he has history of factor-V leiden def and DVT. So he will need blood thinner for that even if we do Watchman.  ----- Message -----  From: Lidya Khoury  Sent: 4/2/2025   4:08 PM EDT  To: Derik Amin MD    Upon check out today, the patient said he would like to proceed with the Watchman procedure.

## 2025-04-05 ENCOUNTER — HOSPITAL ENCOUNTER (OUTPATIENT)
Dept: MRI IMAGING | Facility: HOSPITAL | Age: 75
Discharge: HOME | End: 2025-04-05
Payer: COMMERCIAL

## 2025-04-05 DIAGNOSIS — M47.812 CERVICAL SPONDYLOSIS: ICD-10-CM

## 2025-04-05 DIAGNOSIS — Z98.1 HISTORY OF FUSION OF CERVICAL SPINE: ICD-10-CM

## 2025-04-05 DIAGNOSIS — M54.12 CERVICAL RADICULOPATHY: ICD-10-CM

## 2025-04-05 PROCEDURE — 72141 MRI NECK SPINE W/O DYE: CPT

## 2025-04-07 ENCOUNTER — RESULTS FOLLOW-UP (OUTPATIENT)
Dept: PAIN MEDICINE | Facility: CLINIC | Age: 75
End: 2025-04-07

## 2025-04-07 ENCOUNTER — OFFICE VISIT (OUTPATIENT)
Dept: PHYSICAL THERAPY | Facility: CLINIC | Age: 75
End: 2025-04-07
Payer: COMMERCIAL

## 2025-04-07 DIAGNOSIS — G89.29 CHRONIC RIGHT SHOULDER PAIN: Primary | ICD-10-CM

## 2025-04-07 DIAGNOSIS — M25.511 CHRONIC RIGHT SHOULDER PAIN: Primary | ICD-10-CM

## 2025-04-07 PROCEDURE — 97110 THERAPEUTIC EXERCISES: CPT | Performed by: PHYSICAL THERAPIST

## 2025-04-07 PROCEDURE — 97112 NEUROMUSCULAR REEDUCATION: CPT | Performed by: PHYSICAL THERAPIST

## 2025-04-07 NOTE — TELEPHONE ENCOUNTER
----- Message from Jessi Small PA-C sent at 4/7/2025  6:12 AM EDT -----  Please let patient know his cervical spine MRI shows arthritic changes.  No significant change since his last study.  If continues with pain, I'd refer patient to Dr. De Jesus to address the myofascial component of his pain pattern.

## 2025-04-07 NOTE — PROGRESS NOTES
Daily Note     Today's date: 2025  Patient name: Tian Garcia  : 1950  MRN: [id number]  Referring provider: Ronnie Thorpe, PT  Dx:   Encounter Diagnosis     ICD-10-CM    1. Chronic right shoulder pain  M25.511     G89.29                      Subjective:   Current Status: shoulder is feeling good today  New Symptoms/ Problems: lower back is very painful - fell off lawn chair on Saturday  Response to Last Treatment: no adverse reaction to LV  HEP/ Activity Recommendations:  able to perform some exercises  Progress Towards Goals: continues to have difficulty with repetitive movements and anything over shoulder height    Objective: See treatment diary below      Assessment:   Stage: Chronic   Etiology: disuse  Stability of Symptoms:  At Evaluation: Stable - unchanged  Global Rating of Change:   Symptom Irritability Level: Moderate  Primary Movement System Diagnosis: Hypomobility  Primary Pain Phenotype: Nociceptive  Patient Specific Functional Scale:   3/6/25: get dressed comfortably 0/10, return to lifting without increased pain 0/10, learn management strategies 0/10; Total 0/30   Patient Acceptable Symptom State: unacceptable  FOTO Prediction: 61 by visit 11  FOTO Progress: 45 at evaluation   Greatest Concern: persistent pain   Current Activity Plan: added to HEP (3/6)  Current Educational Needs: appropriate activity modifications, appropriate activity progressions, timeframe for recovery, next steps to take to progress towards goals    Patient continues to respond well to manual treatment - less pain in shoulder following.  Exercises were modified to ensure tolerance with lower back.  Shoulder is progressing slowly.  Skilled PT continues to be required to guide progression of mobility and control to allow him to return to dressing comfortably and lifting without pain.       Plan: Continue with POC.  Monitor tolerance to last treatment and activity recommendations.  Follow up with me on 4/10/25 for  "evaluation of lower back.  Continue progressing shoulder mobility with manual treatment and stretching.          Daily Treatment Diary     DX: (R) Shoulder pain   Follow Up with Referring Provider:   Precautions: NA  CO-MORBIDITIES:  Hx of neck fusion, (B) TKA, A-fib, CHF   HEP ACCESS CODE: [id number]       POC Expires Reeval for Medicare to be completed Unit Limit Auth Expiration Date PT/OT/STVisit Limit   5/1/25 By visit NA  NA 12/31/25 BOMN    Completed on visit                        Auth Status DATE 3/31 4/3 4/7      NA Visit # 6 7 8       Remaining         MANUAL THERAPY         (R) Shoulder Mobs Post, Inf  Gr 2/3  4 min Post, Inf  Gr 2/3  4 min Post, Inf  Gr 2/3  4 min      (R) Shoulder PROM Flex, ABD, ER at 45   4 min Flex, ABD, ER at 45  4 min Flex, ABD, ER at 45  4 min                                          THERAPEUTIC EXERCISE HEP         UBE  3'/3' 3'/3' 3'/3'                Pendulums 3/6 CW/CCW  20x ea CW/CCW  20x ea CW/CCW  20x ea      Pulley Stretches  Flex  Scaption  ABD  5\"x10 ea 5\"x15 ea 5\"x20 ea      Mod Prone Sh' Row 3/6 5# 20x 6# 20x       Mod Prone Sh' Ext 3/6 3# 20x 4# 20x       Mod Prone Sh' ABD 3/6 1# 20x 2# 20x       Mod Prone Sh' Y  0# 20x 0# 20x       Supine Sh' AAROM Flex          S/L Sh' AAROM ABD          S/L Sh' AROM ER          Standing Sh' Flex   10x 15x 20x       Standing Sh' Scaption  10x 15x 20x       Standing Sh' ABD   15x 20x      Bicep Curls                               NEUROMUSCULAR REEDUCATION           Ball Stabilization at Wall  Flex,Ext  H'ABD/ADD  20x ea Flex,Ext  H'ABD/ADD  CW/CCW  20x ea Flex/Ext  H'ABD/ADD  CW/CCW  20x ea                          TB Sh' Row          TB Sh' Ext          TB Sh' ER          TB Sh' IR                                                                                THERAPEUTIC ACTIVITY                                                  GAIT TRAINING                                                  MODALITIES                                  "

## 2025-04-07 NOTE — TELEPHONE ENCOUNTER
S/w pt, advised of above. Pt verbalized understanding and appreciation. Stated that his pain is ok right now but will cb if that changes.

## 2025-04-10 ENCOUNTER — OFFICE VISIT (OUTPATIENT)
Dept: FAMILY MEDICINE CLINIC | Facility: HOSPITAL | Age: 75
End: 2025-04-10
Payer: COMMERCIAL

## 2025-04-10 ENCOUNTER — APPOINTMENT (OUTPATIENT)
Dept: PHYSICAL THERAPY | Facility: CLINIC | Age: 75
End: 2025-04-10
Payer: COMMERCIAL

## 2025-04-10 VITALS
SYSTOLIC BLOOD PRESSURE: 124 MMHG | BODY MASS INDEX: 34.44 KG/M2 | HEIGHT: 71 IN | DIASTOLIC BLOOD PRESSURE: 78 MMHG | WEIGHT: 246 LBS

## 2025-04-10 DIAGNOSIS — M54.42 ACUTE BILATERAL LOW BACK PAIN WITH BILATERAL SCIATICA: Primary | ICD-10-CM

## 2025-04-10 DIAGNOSIS — M54.41 ACUTE BILATERAL LOW BACK PAIN WITH BILATERAL SCIATICA: Primary | ICD-10-CM

## 2025-04-10 PROCEDURE — 99213 OFFICE O/P EST LOW 20 MIN: CPT

## 2025-04-10 NOTE — PROGRESS NOTES
"Name: Tian Garcia      : 1950      MRN: [id number]  Encounter Provider: Bethany Castellano DO  Encounter Date: 4/10/2025   Encounter department: [facility] [address]  :  Assessment & Plan  Acute bilateral low back pain with bilateral sciatica  -Would recommend Tylenol VARGAS 1000mg TID x7 days  -Would recommend increase gabapetin 600mg BID  -F/u PRN              History of Present Illness   Acute on chronic LBP  Injured it on 25- fell from a low chair  Then had to go to MRI, and table was uncomfortable  Pain in Lower back with radiation down both legs. Pain is getting worse. Has had back issues on and off over the years.   S/P PT, was discharged to home exercise program 1 month ago  Was seen by pain management 1 month ago, was having improved pain  Managing with gabapentin 300mg BID, which isn't affecting back  Tramadol also didn't help  Took 1500mg Tylenol, which did help        Review of Systems   Musculoskeletal:  Positive for back pain and gait problem.       Objective   /78   Ht 5' 11\" (1.803 m)   Wt 112 kg (246 lb)   BMI 34.31 kg/m²      Physical Exam  Constitutional:       Appearance: Normal appearance. He is obese.   HENT:      Head: Normocephalic and atraumatic.   Cardiovascular:      Rate and Rhythm: Normal rate and regular rhythm.      Heart sounds: Normal heart sounds.   Pulmonary:      Effort: Pulmonary effort is normal.      Breath sounds: Normal breath sounds.   Musculoskeletal:         General: No tenderness.   Neurological:      Mental Status: He is alert.      Gait: Gait abnormal.   Psychiatric:         Mood and Affect: Mood normal.         Behavior: Behavior normal.         Bethany Castellano DO  Family Medicine    "

## 2025-04-14 ENCOUNTER — OFFICE VISIT (OUTPATIENT)
Dept: PHYSICAL THERAPY | Facility: CLINIC | Age: 75
End: 2025-04-14
Payer: COMMERCIAL

## 2025-04-14 DIAGNOSIS — M54.41 ACUTE BILATERAL LOW BACK PAIN WITH BILATERAL SCIATICA: Primary | ICD-10-CM

## 2025-04-14 DIAGNOSIS — M54.42 ACUTE BILATERAL LOW BACK PAIN WITH BILATERAL SCIATICA: Primary | ICD-10-CM

## 2025-04-14 PROCEDURE — 97110 THERAPEUTIC EXERCISES: CPT | Performed by: PHYSICAL THERAPIST

## 2025-04-14 PROCEDURE — 97162 PT EVAL MOD COMPLEX 30 MIN: CPT | Performed by: PHYSICAL THERAPIST

## 2025-04-14 NOTE — PROGRESS NOTES
PT Evaluation     Today's date: 2025  Patient name: Tian Garcia  : 1950  MRN: [id number]  Referring provider: Ronnie Thorpe PT  Dx:   Encounter Diagnosis     ICD-10-CM    1. Acute bilateral low back pain with bilateral sciatica  M54.42     M54.41                      Assessment  Impairments: abnormal gait, abnormal or restricted ROM, activity intolerance, impaired physical strength, lacks appropriate home exercise program and pain with function  Symptom irritability: moderate    Assessment details: Tian Garcia is a 75 y.o. male presenting to outpatient physical therapy with chief complaints of lower back pain with radiating pain down both legs - (L)>(R). Patient describes acute onset of lower back pain on 25 - rolled out of lawn chair.  Patient displays with abnormal gait, restricted lumbar AROM, no myotomal weakness from current presentation, (+) SLR, (+) DERRICK, and functional restrictions.  Patient's symptoms are multifactorial in nature with a primary movement diagnosis of hypomobility resulting in pathoanatomical signs and symptoms consistent with Acute bilateral low back pain with bilateral sciatica  (primary encounter diagnosis) resulting in limitations in his ability to perform basic home projects without increased pain.  No further referral appears necessary at this time based upon examination results.    Please contact me if you have any questions. Thank you for the opportunity to share in the care of this patient.        Understanding of Dx/Px/POC: good     Prognosis: good  Prognosis details: Positive prognostic indicators include positive attitude toward recovery, motivated to improve, good understanding of condition, and realistic expectations.      Goals  STG to be met in 4 weeks:  - Increase Lumbar AROM: minimal to moderate restrictions all planes.  - Increase (B) LE strength by 1/2 MMT grade.  - I with HEP.  - Patient will be able to lifting and carrying groceries.   LTG to be  met in 8 weeks:  - Increase Lumbar AROM: minimal restrictions all planes.  - Increase (B) LE strength to 5/5 all planes.  - I with updated HEP.  - Patient will be able to return to performing all home projects without increased pain.       Plan  Patient would benefit from: skilled physical therapy  Planned modality interventions: cryotherapy and thermotherapy: hydrocollator packs    Planned therapy interventions: activity modification, joint mobilization, manual therapy, neuromuscular re-education, patient education, postural training, strengthening, stretching, therapeutic exercise, therapeutic activities, functional ROM exercises, home exercise program, gait training and body mechanics training    Plan of Care beginning date: 2025  Plan of Care expiration date: 2025  Treatment plan discussed with: patient  Plan details: Prognosis above is given PT services 2x/week tapering to 1x/week over the next 8 weeks and home program adherence.           Subjective Evaluation    History of Present Illness  Mechanism of injury: At Evaluation (2025): Patient reports onset of lower back pain on 25 - notes rolling out of lawn chair - also had a cervical MRI that evening - notes having a lot of pain after getting up from the MRI - pain radiating down the legs to the knees.  He saw his PCP's office - recommended additional medication.  Symptoms are persistent but improving.      Red Flag Screen: Patient denies: fever, changes in bowel or bladder function, headache, dizziness, visual changes, nausea, vomiting, weakness, unexplained weight loss, numbness, tingling , or pain with coughing/ sneezing.      Greatest concern: lack of function  Quality of life: good    Patient Goals  Patient goal: Patient Specific Functional Scale: return to walking without increased pain 0/10, lift and carry groceries into house 0/10, return to performing home projects 0/10; Total 0/30  Pain  Current pain ratin  At best pain  ratin  At worst pain rating: 10  Location: lower lumbar spine - radiating down both legs (L>R)  Quality: dull ache, sharp and radiating  Alleviating factors: Activity modification.  Exacerbated by: Coughing, bending, twisting, lifting, stair negotiation.    Social Support  Steps to enter house: yes  Stairs in house: yes   Lives in: multiple-level home  Lives with: spouse    Employment status: not working    Diagnostic Tests    FCE comments: No imaging for lumbar spine recentlyTreatments  Previous treatment: medication  Current treatment: medication        Objective     Static Posture   General Observations  Symmetrical weight bearing.     Lumbar Spine   Flattened.     Postural Observations  Seated posture: fair  Standing posture: fair      Palpation     Additional Palpation Details  TTP throughout lumbar spine     Active Range of Motion     Lumbar   Flexion:  with pain Restriction level: moderate  Extension:  with pain Restriction level: maximal    Additional Active Range of Motion Details  (R) SG (hips to (L)): moderate restriction - no pain  (L) SG (hips to (R)): maximal restriction - increased pain    Strength/Myotome Testing     Left Hip   Planes of Motion   Flexion: 4+    Right Hip   Planes of Motion   Flexion: 4+    Left Knee   Extension: 4+    Right Knee   Extension: 4+    Left Ankle/Foot   Dorsiflexion: 4+  Plantar flexion: 4+  Great toe extension: 4+    Right Ankle/Foot   Dorsiflexion: 3+  Plantar flexion: 4+  Great toe extension: 4+    Tests     Lumbar   Negative SIJ compression and sacroiliac distraction.     Left   Positive passive SLR.   Negative crossed SLR and slump test.     Right   Negative crossed SLR, passive SLR and slump test.     Left Hip   Positive DERRICK and FADIR.     Right Hip   Positive DERRICK.   Negative FADIR.     Ambulation     Observational Gait   Gait: antalgic   Decreased walking speed, left stance time, left step length and right step length.             Daily Treatment Diary      DX: Acute LBP   Follow Up with Referring Provider:   Precautions: NA  CO-MORBIDITIES: Hx of neck fusion, (B) TKA, A-fib, CHF       POC Expires Reeval for Medicare to be completed Unit Limit Auth Expiration Date PT/OT/STVisit Limit   6/9/25 By visit NA  NA 12/31/25 BOMN    Completed on visit                  Stage: acute  Etiology: traumatic   Stability of Symptoms:  At Evaluation: Evolving - improving  Global Rating of Change:   Symptom Irritability Level: Moderate  Primary Movement System Diagnosis: Hypomobility  Primary Pain Phenotype: Neurogenic  Patient Specific Functional Scale:   4/14/25: return to walking without increased pain 0/10, lift and carry groceries into house 0/10, return to performing home projects 0/10; Total 0/30   Patient Acceptable Symptom State: unacceptable  FOTO Prediction: 49 by visit 13  FOTO Progress: 27 at evaluation   Greatest Concern: lack of function  Current Activity Plan: added to HEP (4/14)  Current Educational Needs: appropriate activity modifications, appropriate activity progressions, timeframe for recovery, next steps to take to progress towards goals    Auth Status DATE 4/14       NA Visit # 1        Remaining        MANUAL THERAPY        (R) Shoulder Mobs        (R) Shoulder PROM        Lumbar Mobs                                THERAPEUTIC EXERCISE HEP        UBE                  Pendulums 3/6        Pulley Stretches  Flex  Scaption  ABD         Mod Prone Sh' Row 3/6        Mod Prone Sh' Ext 3/6        Mod Prone Sh' ABD 3/6        Mod Prone Sh' Y         Supine Sh' AAROM Flex         S/L Sh' AAROM ABD         S/L Sh' AROM ER         Standing Sh' Flex          Standing Sh' Scaption         Standing Sh' ABD         Bicep Curls                   Abdominal Brace 4/14        Supine March 4/14        H/L Hip ADD Isometric 4/14        BKFO 4/14        Bridge                            NEUROMUSCULAR REEDUCATION          Ball Stabilization at Wall                           TB Sh'  Row         TB Sh' Ext         TB Sh' ER         TB Sh' IR                  FWD Mini Lunge         LAT Mini Lunge                                             THERAPEUTIC ACTIVITY                                             GAIT TRAINING                                             MODALITIES

## 2025-04-14 NOTE — HOME EXERCISE EDUCATION
Program_ID:[id number]   Access Code: [email or web address]  URL: [email or web address]  Date: 04-  Prepared By: Ronnie Thorpe    Program Notes      Exercises      - Supine Posterior Pelvic Tilt - 2 x daily -  x weekly - 2 sets - 10 reps - 5 second hold      - Supine March - 2 x daily -  x weekly - 2 sets - 10 reps      - Supine Hip Adduction Isometric with Ball - 2 x daily -  x weekly - 2 sets - 10 reps - 5 second hold      - Bent Knee Fallouts - 2 x daily -  x weekly - 2 sets - 10 reps    Patient Education      - Clinic Information       - Your Next Appointment      - Understanding Pain

## 2025-04-16 ENCOUNTER — TELEPHONE (OUTPATIENT)
Age: 75
End: 2025-04-16

## 2025-04-16 DIAGNOSIS — I25.10 ATHEROSCLEROSIS OF NATIVE CORONARY ARTERY OF NATIVE HEART WITHOUT ANGINA PECTORIS: ICD-10-CM

## 2025-04-16 DIAGNOSIS — I10 PRIMARY HYPERTENSION: ICD-10-CM

## 2025-04-16 NOTE — TELEPHONE ENCOUNTER
Please refill the patient's script for Torsemide - 20 mg. tablet.  This is for a 90 day supply with refills.  Please send the script to the [facility] in [place]

## 2025-04-16 NOTE — TELEPHONE ENCOUNTER
Spoke to pt. Explained Dr Castellano is out of the office today but I will speak to her first thing tomorrow to see if apt is needed or if she can just order the Xray for him. I advised that I will call him in the morning to let him know either way. He expressed understanding.

## 2025-04-16 NOTE — TELEPHONE ENCOUNTER
Requested medication(s) are due for refill today: Yes  Patient has already received a courtesy refill: No  Other reason request has been forwarded to provider: Per refill pod provider needs to approve.

## 2025-04-16 NOTE — TELEPHONE ENCOUNTER
Patient called stating seen 4/10 for back pain.  He is still having issues and would like to know if provider would order an xray.  Appointment made for tomorrow as well.  Please advise.

## 2025-04-17 ENCOUNTER — HOSPITAL ENCOUNTER (OUTPATIENT)
Dept: RADIOLOGY | Facility: HOSPITAL | Age: 75
End: 2025-04-17
Payer: COMMERCIAL

## 2025-04-17 ENCOUNTER — OFFICE VISIT (OUTPATIENT)
Dept: PHYSICAL THERAPY | Facility: CLINIC | Age: 75
End: 2025-04-17
Payer: COMMERCIAL

## 2025-04-17 DIAGNOSIS — M54.41 ACUTE BILATERAL LOW BACK PAIN WITH BILATERAL SCIATICA: Primary | ICD-10-CM

## 2025-04-17 DIAGNOSIS — G89.29 CHRONIC RIGHT SHOULDER PAIN: ICD-10-CM

## 2025-04-17 DIAGNOSIS — M25.511 CHRONIC RIGHT SHOULDER PAIN: ICD-10-CM

## 2025-04-17 DIAGNOSIS — M54.42 ACUTE BILATERAL LOW BACK PAIN WITH BILATERAL SCIATICA: Primary | ICD-10-CM

## 2025-04-17 DIAGNOSIS — M54.50 ACUTE LOW BACK PAIN, UNSPECIFIED BACK PAIN LATERALITY, UNSPECIFIED WHETHER SCIATICA PRESENT: ICD-10-CM

## 2025-04-17 DIAGNOSIS — M54.50 ACUTE LOW BACK PAIN, UNSPECIFIED BACK PAIN LATERALITY, UNSPECIFIED WHETHER SCIATICA PRESENT: Primary | ICD-10-CM

## 2025-04-17 PROCEDURE — 72110 X-RAY EXAM L-2 SPINE 4/>VWS: CPT

## 2025-04-17 PROCEDURE — 97110 THERAPEUTIC EXERCISES: CPT | Performed by: PHYSICAL THERAPIST

## 2025-04-17 PROCEDURE — 97140 MANUAL THERAPY 1/> REGIONS: CPT | Performed by: PHYSICAL THERAPIST

## 2025-04-17 RX ORDER — TORSEMIDE 20 MG/1
20 TABLET ORAL DAILY
Qty: 90 TABLET | Refills: 0 | Status: SHIPPED | OUTPATIENT
Start: 2025-04-17 | End: 2025-04-24

## 2025-04-17 NOTE — PROGRESS NOTES
Daily Note     Today's date: 2025  Patient name: Tian Garcia  : 1950  MRN: [id number]  Referring provider: Ronnie Thorpe, PT  Dx:   Encounter Diagnosis     ICD-10-CM    1. Acute bilateral low back pain with bilateral sciatica  M54.42     M54.41       2. Chronic right shoulder pain  M25.511     G89.29                      Subjective:   Current Status: lower back is feeling very sore today - pain is more central today  New Symptoms/ Problems: NA  Response to Last Treatment: no adverse reaction to LV  HEP/ Activity Recommendations:  increased pain yesterday with new HEP  Progress Towards Goals: no substantial progress yet    Objective: See treatment diary below      Assessment:   Stage: acute  Etiology: traumatic   Stability of Symptoms:  At Evaluation: Evolving - improving  Global Rating of Change:   Symptom Irritability Level: Moderate  Primary Movement System Diagnosis: Hypomobility  Primary Pain Phenotype: Neurogenic  Patient Specific Functional Scale:   25: return to walking without increased pain 0/10, lift and carry groceries into house 0/10, return to performing home projects 0/10; Total 0/30   Patient Acceptable Symptom State: unacceptable  FOTO Prediction: 49 by visit 13  FOTO Progress: 27 at evaluation   Greatest Concern: lack of function  Current Activity Plan: added to HEP ()  Current Educational Needs: appropriate activity modifications, appropriate activity progressions, timeframe for recovery, next steps to take to progress towards goals    Patient had poor tolerance to attempted treatment for lumbar spine - recommended getting x-ray results before continuing with treatment.  He tolerated shoulder manual treatment well - shoulder mobility is improving.  Skilled PT continues to be required to guide progression of lumbar mobility and strength to allow him to return to regular exercise.       Plan: Continue with POC.  Monitor tolerance to last treatment and activity  "recommendations.  Follow up with me on 4/21/25.  Continue progressing lumbar and shoulder mobility and strength with progressive exercise.         Daily Treatment Diary     DX: Acute LBP   Follow Up with Referring Provider:   Precautions: NA  CO-MORBIDITIES: Hx of neck fusion, (B) TKA, A-fib, CHF       POC Expires Reeval for Medicare to be completed Unit Limit Auth Expiration Date PT/OT/STVisit Limit   6/9/25 By visit NA  NA 12/31/25 BOMN    Completed on visit                Auth Status DATE 4/17       NA Visit # 2        Remaining        MANUAL THERAPY        (R) Shoulder Mobs Post, Inf  Gr 2/3  4 min       (R) Shoulder PROM Flex, ABD, ER at 45  4 min       Lumbar Mobs Held                               THERAPEUTIC EXERCISE HEP        UBE         Recumbent Bike  6 min       Pendulums 3/6        Pulley Stretches  Flex  Scaption  ABD         Mod Prone Sh' Row 3/6 6# 20x       Mod Prone Sh' Ext 3/6 4# 20x       Mod Prone Sh' ABD 3/6 2# 20x       Mod Prone Sh' Y  0# 20x       Supine Sh' AAROM Flex         S/L Sh' AAROM ABD         S/L Sh' AROM ER         Standing Sh' Flex          Standing Sh' Scaption         Standing Sh' ABD         Bicep Curls                   Abdominal Brace 4/14 5\"x10       Supine March 4/14        H/L Hip ADD Isometric 4/14        BKFO 4/14        Bridge                            NEUROMUSCULAR REEDUCATION          Ball Stabilization at Wall                           TB Sh' Row         TB Sh' Ext         TB Sh' ER         TB Sh' IR                  FWD Mini Lunge         LAT Mini Lunge                                             THERAPEUTIC ACTIVITY                                             GAIT TRAINING                                             MODALITIES                                    "

## 2025-04-18 ENCOUNTER — ANTICOAG VISIT (OUTPATIENT)
Dept: FAMILY MEDICINE CLINIC | Facility: HOSPITAL | Age: 75
End: 2025-04-18

## 2025-04-18 ENCOUNTER — RESULTS FOLLOW-UP (OUTPATIENT)
Dept: FAMILY MEDICINE CLINIC | Facility: HOSPITAL | Age: 75
End: 2025-04-18

## 2025-04-18 LAB
CHOLEST SERPL-MCNC: 157 MG/DL (ref 100–199)
EST. AVERAGE GLUCOSE BLD GHB EST-MCNC: 140 MG/DL
HBA1C MFR BLD: 6.5 % (ref 4.8–5.6)
HDLC SERPL-MCNC: 61 MG/DL
INR PPP: 2.7 (ref 0.9–1.2)
LDLC SERPL CALC-MCNC: 78 MG/DL (ref 0–99)
LDLC/HDLC SERPL: 1.3 RATIO (ref 0–3.6)
PROTHROMBIN TIME: 27.2 SEC (ref 9.1–12)
SL AMB VLDL CHOLESTEROL CALC: 18 MG/DL (ref 5–40)
TRIGL SERPL-MCNC: 100 MG/DL (ref 0–149)

## 2025-04-21 ENCOUNTER — APPOINTMENT (OUTPATIENT)
Dept: PHYSICAL THERAPY | Facility: CLINIC | Age: 75
End: 2025-04-21
Attending: INTERNAL MEDICINE
Payer: COMMERCIAL

## 2025-04-24 ENCOUNTER — OFFICE VISIT (OUTPATIENT)
Dept: FAMILY MEDICINE CLINIC | Facility: HOSPITAL | Age: 75
End: 2025-04-24
Payer: COMMERCIAL

## 2025-04-24 VITALS
OXYGEN SATURATION: 97 % | BODY MASS INDEX: 35.08 KG/M2 | HEIGHT: 71 IN | DIASTOLIC BLOOD PRESSURE: 72 MMHG | WEIGHT: 250.6 LBS | SYSTOLIC BLOOD PRESSURE: 122 MMHG | HEART RATE: 64 BPM

## 2025-04-24 DIAGNOSIS — I10 PRIMARY HYPERTENSION: ICD-10-CM

## 2025-04-24 DIAGNOSIS — I25.10 ATHEROSCLEROSIS OF NATIVE CORONARY ARTERY OF NATIVE HEART WITHOUT ANGINA PECTORIS: ICD-10-CM

## 2025-04-24 DIAGNOSIS — M54.16 LUMBAR RADICULOPATHY: Primary | ICD-10-CM

## 2025-04-24 DIAGNOSIS — R60.9 EDEMA, UNSPECIFIED TYPE: ICD-10-CM

## 2025-04-24 PROCEDURE — 99214 OFFICE O/P EST MOD 30 MIN: CPT

## 2025-04-24 RX ORDER — PREDNISONE 20 MG/1
20 TABLET ORAL DAILY
Qty: 5 TABLET | Refills: 0 | Status: SHIPPED | OUTPATIENT
Start: 2025-04-24 | End: 2025-04-29

## 2025-04-24 RX ORDER — TORSEMIDE 20 MG/1
60 TABLET ORAL DAILY
Qty: 14 TABLET | Refills: 0 | Status: SHIPPED | OUTPATIENT
Start: 2025-04-24 | End: 2025-04-29

## 2025-04-24 RX ORDER — TIRZEPATIDE 2.5 MG/.5ML
5 INJECTION, SOLUTION SUBCUTANEOUS WEEKLY
COMMUNITY
Start: 2025-04-23

## 2025-04-24 NOTE — ASSESSMENT & PLAN NOTE
Reports increased swelling in his lower legs bilaterally, 2+ edema noted on exam.  Prescribed torsemide 20 mg daily by his cardiologist but increased to 40 due to weight gain, advised to increase dosage to 60 mg daily, and obtain BMP to monitor kidney function and electrolytes.  Advised to follow-up with cardiology soon as scheduled    Orders:    torsemide (DEMADEX) 20 mg tablet; Take 3 tablets (60 mg total) by mouth daily

## 2025-04-24 NOTE — PROGRESS NOTES
Name: Tian Garcia      : 1950      MRN: [id number]  Encounter Provider: Christopher Rucker MD  Encounter Date: 2025   Encounter department: [facility] [address]  :  Assessment & Plan  Lumbar radiculopathy  Lumbar back pain radiating down both hips and thighs consistent with radiculopathy, straight leg test inconclusive.  Will treat with trial of prednisone and refer to spine and pain.  Denies red flag symptoms.  Radiculopathy handout reviewed and provided.    Orders:    predniSONE 20 mg tablet; Take 1 tablet (20 mg total) by mouth daily for 5 days    Ambulatory referral to Spine & Pain Management; Future    Atherosclerosis of native coronary artery of native heart without angina pectoris  Reports increased swelling in his lower legs bilaterally, 2+ edema noted on exam.  Prescribed torsemide 20 mg daily by his cardiologist but increased to 40 due to weight gain, advised to increase dosage to 60 mg daily, and obtain BMP to monitor kidney function and electrolytes.  Advised to follow-up with cardiology soon as scheduled    Orders:    torsemide (DEMADEX) 20 mg tablet; Take 3 tablets (60 mg total) by mouth daily    Primary hypertension    Orders:    torsemide (DEMADEX) 20 mg tablet; Take 3 tablets (60 mg total) by mouth daily    Edema, unspecified type    Orders:    torsemide (DEMADEX) 20 mg tablet; Take 3 tablets (60 mg total) by mouth daily    Basic metabolic panel; Future           History of Present Illness   HPI  Patient presents with his wife with concern for lumbar back pain radiating down both hips and thighs    Review of Systems   Constitutional:  Negative for chills and fever.   Respiratory:  Negative for shortness of breath.    Cardiovascular:  Positive for leg swelling. Negative for chest pain and palpitations.   Musculoskeletal:  Positive for back pain. Negative for gait problem.   Neurological:  Negative for weakness and numbness.       Objective   /72    "Pulse 64   Ht 5' 11\" (1.803 m)   Wt 114 kg (250 lb 9.6 oz)   SpO2 97%   BMI 34.95 kg/m²      Physical Exam  Constitutional:       General: He is not in acute distress.     Appearance: Normal appearance. He is obese.   Pulmonary:      Effort: No respiratory distress.      Breath sounds: Normal breath sounds.   Musculoskeletal:      Right lower leg: Edema (+2) present.      Left lower leg: Edema (+2) present.      Comments: Mild lumbar spinal tenderness   Skin:     Capillary Refill: Capillary refill takes less than 2 seconds.   Neurological:      Mental Status: He is alert and oriented to person, place, and time.   Psychiatric:         Mood and Affect: Mood normal.         Behavior: Behavior normal.         "

## 2025-04-25 ENCOUNTER — APPOINTMENT (OUTPATIENT)
Dept: PHYSICAL THERAPY | Facility: CLINIC | Age: 75
End: 2025-04-25
Attending: INTERNAL MEDICINE
Payer: COMMERCIAL

## 2025-04-28 ENCOUNTER — TELEPHONE (OUTPATIENT)
Age: 75
End: 2025-04-28

## 2025-04-28 NOTE — TELEPHONE ENCOUNTER
Patient calling his C-PAP machine isn't working properly, he hasn't been seen since 2017, but is scheduled for a NP appointment in July.      Patient asking if there is any place he can take the machine to have it repaired.    Patient requesting a call back

## 2025-04-28 NOTE — TELEPHONE ENCOUNTER
"Called Tian and spoke with him regarding his CPAP. Informed him that Pulmonary Office does not have a \"go to\" place for CPAP repairs as this is commonly under the DME umbrella. The Office does not do repairs or do we know of a specific place one can go to have a CPAP repaired. Pt states sometimes is blows too hard or not at all. Explained that calling his DME ([employer] in this case) and explain to them the situation. They may be able to send a technician out to him to look at his machine. The Pulmonary office is unable to provide a new machine order/script without a recent visit.   Pt expressed understanding and will call [employer] to inquire about repair.  "

## 2025-04-29 ENCOUNTER — OFFICE VISIT (OUTPATIENT)
Dept: PHYSICAL THERAPY | Facility: CLINIC | Age: 75
End: 2025-04-29
Attending: INTERNAL MEDICINE
Payer: COMMERCIAL

## 2025-04-29 ENCOUNTER — RESULTS FOLLOW-UP (OUTPATIENT)
Dept: FAMILY MEDICINE CLINIC | Facility: HOSPITAL | Age: 75
End: 2025-04-29

## 2025-04-29 DIAGNOSIS — I10 PRIMARY HYPERTENSION: ICD-10-CM

## 2025-04-29 DIAGNOSIS — M25.511 CHRONIC RIGHT SHOULDER PAIN: ICD-10-CM

## 2025-04-29 DIAGNOSIS — I25.10 ATHEROSCLEROSIS OF NATIVE CORONARY ARTERY OF NATIVE HEART WITHOUT ANGINA PECTORIS: ICD-10-CM

## 2025-04-29 DIAGNOSIS — G89.29 CHRONIC RIGHT SHOULDER PAIN: ICD-10-CM

## 2025-04-29 DIAGNOSIS — M54.41 ACUTE BILATERAL LOW BACK PAIN WITH BILATERAL SCIATICA: Primary | ICD-10-CM

## 2025-04-29 DIAGNOSIS — R60.9 EDEMA, UNSPECIFIED TYPE: ICD-10-CM

## 2025-04-29 DIAGNOSIS — M54.42 ACUTE BILATERAL LOW BACK PAIN WITH BILATERAL SCIATICA: Primary | ICD-10-CM

## 2025-04-29 LAB
BUN SERPL-MCNC: 60 MG/DL (ref 8–27)
BUN/CREAT SERPL: 27 (ref 10–24)
CALCIUM SERPL-MCNC: 10 MG/DL (ref 8.6–10.2)
CHLORIDE SERPL-SCNC: 96 MMOL/L (ref 96–106)
CO2 SERPL-SCNC: 22 MMOL/L (ref 20–29)
CREAT SERPL-MCNC: 2.2 MG/DL (ref 0.76–1.27)
EGFR: 31 ML/MIN/1.73
GLUCOSE SERPL-MCNC: 134 MG/DL (ref 70–99)
POTASSIUM SERPL-SCNC: 4.5 MMOL/L (ref 3.5–5.2)
SODIUM SERPL-SCNC: 136 MMOL/L (ref 134–144)

## 2025-04-29 PROCEDURE — 97110 THERAPEUTIC EXERCISES: CPT | Performed by: PHYSICAL THERAPIST

## 2025-04-29 PROCEDURE — 97140 MANUAL THERAPY 1/> REGIONS: CPT | Performed by: PHYSICAL THERAPIST

## 2025-04-29 RX ORDER — TORSEMIDE 20 MG/1
30 TABLET ORAL DAILY
Qty: 45 TABLET | Refills: 0 | Status: SHIPPED | OUTPATIENT
Start: 2025-04-29

## 2025-04-29 NOTE — PROGRESS NOTES
Daily Note     Today's date: 2025  Patient name: Tian Garcia  : 1950  MRN: [id number]  Referring provider: Ronnie Thorpe, PT  Dx:   Encounter Diagnosis     ICD-10-CM    1. Acute bilateral low back pain with bilateral sciatica  M54.42     M54.41       2. Chronic right shoulder pain  M25.511     G89.29           Start Time: 1225          Subjective:   Current Status: lower back is feeling a lot better - has been on prednisone - today is last day  New Symptoms/ Problems: NA  Response to Last Treatment: no adverse reaction to LV  HEP/ Activity Recommendations:  going well - no current questions   Progress Towards Goals: no substantial progress yet    Objective: See treatment diary below      Assessment:   Stage: acute  Etiology: traumatic   Stability of Symptoms:  At Evaluation: Evolving - improving  Global Rating of Change:   Symptom Irritability Level: Moderate  Primary Movement System Diagnosis: Hypomobility  Primary Pain Phenotype: Neurogenic  Patient Specific Functional Scale:   25: return to walking without increased pain 0/10, lift and carry groceries into house 0/10, return to performing home projects 0/10; Total 0/30   Patient Acceptable Symptom State: unacceptable  FOTO Prediction: 49 by visit 13  FOTO Progress: 27 at evaluation   Greatest Concern: lack of function  Current Activity Plan: added to HEP ()  Current Educational Needs: appropriate activity modifications, appropriate activity progressions, timeframe for recovery, next steps to take to progress towards goals    Patient had improved tolerance to manual treatment - shoulder is moving better and lower back is less painful.  Gentle core exercises were tolerated well - slight muscular discomfort following.   Skilled PT continues to be required to guide progression of lumbar mobility and strength to allow him to return to regular exercise.       Plan: Continue with POC.  Monitor tolerance to last treatment and activity  "recommendations.  Follow up with me on 5/2/25.  Continue progressing lumbar and shoulder mobility and strength with progressive exercise.         Daily Treatment Diary     DX: Acute LBP   Follow Up with Referring Provider:   Precautions: NA  CO-MORBIDITIES: Hx of neck fusion, (B) TKA, A-fib, CHF       POC Expires Reeval for Medicare to be completed Unit Limit Auth Expiration Date PT/OT/STVisit Limit   6/9/25 By visit NA  NA 12/31/25 BOMN    Completed on visit                Auth Status DATE 4/17 4/29      NA Visit # 2 3       Remaining        MANUAL THERAPY        (R) Shoulder Mobs Post, Inf  Gr 2/3  4 min Post, Inf  Gr 2/3  2 min      (R) Shoulder PROM Flex, ABD, ER at 45  4 min Flex, ABD, ER at 60   3 min      Lumbar Mobs Held (R) S/L Rotation  Gr 3/4  3 min                              THERAPEUTIC EXERCISE HEP        UBE         Recumbent Bike  6 min 6 min      Pendulums 3/6        Pulley Stretches  Flex  Scaption  ABD         Mod Prone Sh' Row 3/6 6# 20x       Mod Prone Sh' Ext 3/6 4# 20x       Mod Prone Sh' ABD 3/6 2# 20x       Mod Prone Sh' Y  0# 20x       Supine Sh' AAROM Flex         S/L Sh' AAROM ABD         S/L Sh' AROM ER         Standing Sh' Flex          Standing Sh' Scaption         Standing Sh' ABD         Bicep Curls                   Abdominal Brace 4/14 5\"x10 5\"x10      Supine March 4/14  20x ea      H/L Hip ADD Isometric 4/14  5\"x20      BKFO 4/14        Bridge    5\"x20                        NEUROMUSCULAR REEDUCATION          Ball Stabilization at Wall                           TB Sh' Row         TB Sh' Ext         TB Sh' ER         TB Sh' IR                  FWD Mini Lunge         LAT Mini Lunge                                             THERAPEUTIC ACTIVITY                                             GAIT TRAINING                                             MODALITIES                                    "

## 2025-04-29 NOTE — TELEPHONE ENCOUNTER
Spoke with patient by phone, reviewed recent BMP, expressed concerned for worsening prerenal function likely secondary to recently increasing his torsemide diuretic to address his worsening lower leg edema which he says is since resolved.  Advised to reduce torsemide dosage from 60 to 30 mg daily, repeat BMP in approximately 1 week, and follow-up with cardiology next week and nephrology the following week as scheduled.  He reports continued symptoms of lumbar radiculopathy refractory to recent 5-day course of prednisone, advised to follow-up with pain and spine management, patient already has that referral.

## 2025-05-02 ENCOUNTER — OFFICE VISIT (OUTPATIENT)
Dept: PHYSICAL THERAPY | Facility: CLINIC | Age: 75
End: 2025-05-02
Attending: INTERNAL MEDICINE
Payer: COMMERCIAL

## 2025-05-02 DIAGNOSIS — G89.29 CHRONIC RIGHT SHOULDER PAIN: ICD-10-CM

## 2025-05-02 DIAGNOSIS — M54.41 ACUTE BILATERAL LOW BACK PAIN WITH BILATERAL SCIATICA: Primary | ICD-10-CM

## 2025-05-02 DIAGNOSIS — M25.511 CHRONIC RIGHT SHOULDER PAIN: ICD-10-CM

## 2025-05-02 DIAGNOSIS — M54.42 ACUTE BILATERAL LOW BACK PAIN WITH BILATERAL SCIATICA: Primary | ICD-10-CM

## 2025-05-02 PROCEDURE — 97110 THERAPEUTIC EXERCISES: CPT | Performed by: PHYSICAL THERAPIST

## 2025-05-02 PROCEDURE — 97140 MANUAL THERAPY 1/> REGIONS: CPT | Performed by: PHYSICAL THERAPIST

## 2025-05-02 NOTE — PROGRESS NOTES
Daily Note     Today's date: 2025  Patient name: Tian Garcia  : 1950  MRN: [id number]  Referring provider: Ronnie Thorpe, PT  Dx:   Encounter Diagnosis     ICD-10-CM    1. Acute bilateral low back pain with bilateral sciatica  M54.42     M54.41       2. Chronic right shoulder pain  M25.511     G89.29                      Subjective:   Current Status: lower back continues to feel good - has stopped prednisone   New Symptoms/ Problems: NA  Response to Last Treatment: no adverse reaction to LV  HEP/ Activity Recommendations:  going well - no current questions   Progress Towards Goals: lower back is feeling better - still has pain at times     Objective: See treatment diary below      Assessment:   Stage: acute  Etiology: traumatic   Stability of Symptoms:  At Evaluation: Evolving - improving  Global Rating of Change:   Symptom Irritability Level: Moderate  Primary Movement System Diagnosis: Hypomobility  Primary Pain Phenotype: Neurogenic  Patient Specific Functional Scale:   25: return to walking without increased pain 0/10, lift and carry groceries into house 0/10, return to performing home projects 0/10; Total 0/30   Patient Acceptable Symptom State: unacceptable  FOTO Prediction: 49 by visit 13  FOTO Progress: 27 at evaluation   Greatest Concern: lack of function  Current Activity Plan: added to HEP ()  Current Educational Needs: appropriate activity modifications, appropriate activity progressions, timeframe for recovery, next steps to take to progress towards goals    Patient had good tolerance to manual treatment.  He noted some shoulder soreness with stabilization exercise at wall.  He demonstrated good exercise form with exercises.  He continues to be very fatigued with current level of exercise.  Skilled PT continues to be required to guide progression of lumbar mobility and strength to allow him to return to regular exercise.       Plan: Continue with POC.  Monitor tolerance to last  "treatment and activity recommendations.  Follow up with me on 5/5/25.  Continue progressing lumbar and shoulder mobility and strength with progressive exercise.         Daily Treatment Diary     DX: Acute LBP   Follow Up with Referring Provider:   Precautions: NA  CO-MORBIDITIES: Hx of neck fusion, (B) TKA, A-fib, CHF       POC Expires Reeval for Medicare to be completed Unit Limit Auth Expiration Date PT/OT/STVisit Limit   6/9/25 By visit NA  NA 12/31/25 BOMN    Completed on visit                Auth Status DATE 4/17 4/29 5/2     NA Visit # 2 3 4      Remaining        MANUAL THERAPY        (R) Shoulder Mobs Post, Inf  Gr 2/3  4 min Post, Inf  Gr 2/3  2 min Post, Inf  Gr 2/3  2 min     (R) Shoulder PROM Flex, ABD, ER at 45  4 min Flex, ABD, ER at 60   3 min Flex, ABD, ER at 60   3 min     Lumbar Mobs Held (R) S/L Rotation  Gr 3/4  3 min (R) S/L Rotation  Gr 3/4  3 min                             THERAPEUTIC EXERCISE HEP        UBE         Recumbent Bike  6 min 6 min 6 min     Pendulums 3/6        Pulley Stretches  Flex  Scaption  ABD         Mod Prone Sh' Row 3/6 6# 20x       Mod Prone Sh' Ext 3/6 4# 20x       Mod Prone Sh' ABD 3/6 2# 20x       Mod Prone Sh' Y  0# 20x       Supine Sh' AAROM Flex         S/L Sh' AAROM ABD         S/L Sh' AROM ER         Standing Sh' Flex          Standing Sh' Scaption         Standing Sh' ABD         Bicep Curls                   Abdominal Brace 4/14 5\"x10 5\"x10 5\"x10     Supine March 4/14  20x ea 20x ea     H/L Hip ADD Isometric 4/14  5\"x20 5\"x20     BKFO 4/14        Bridge    5\"x20 5\"x20                       NEUROMUSCULAR REEDUCATION          Ball Stabilization at Wall    CW/CCW  20x ea                       TB Sh' Row         TB Sh' Ext         TB Sh' ER         TB Sh' IR                  FWD Mini Lunge         LAT Mini Lunge                                             THERAPEUTIC ACTIVITY                                             GAIT TRAINING                                  "            MODALITIES

## 2025-05-03 ENCOUNTER — OFFICE VISIT (OUTPATIENT)
Dept: URGENT CARE | Facility: CLINIC | Age: 75
End: 2025-05-03
Payer: COMMERCIAL

## 2025-05-03 VITALS
TEMPERATURE: 97.5 F | HEART RATE: 60 BPM | WEIGHT: 244.4 LBS | DIASTOLIC BLOOD PRESSURE: 78 MMHG | BODY MASS INDEX: 34.09 KG/M2 | RESPIRATION RATE: 18 BRPM | OXYGEN SATURATION: 99 % | SYSTOLIC BLOOD PRESSURE: 126 MMHG

## 2025-05-03 DIAGNOSIS — S51.012A SKIN TEAR OF LEFT ELBOW WITHOUT COMPLICATION, INITIAL ENCOUNTER: Primary | ICD-10-CM

## 2025-05-03 DIAGNOSIS — Z23 ENCOUNTER FOR IMMUNIZATION: ICD-10-CM

## 2025-05-03 PROCEDURE — 90715 TDAP VACCINE 7 YRS/> IM: CPT

## 2025-05-03 PROCEDURE — 99203 OFFICE O/P NEW LOW 30 MIN: CPT | Performed by: PHYSICIAN ASSISTANT

## 2025-05-03 NOTE — PROGRESS NOTES
[facility]        NAME: Tian Garcia is a 75 y.o. male  : 1950    MRN: [id number]  DATE: May 3, 2025  TIME: 6:07 PM    Assessment and Plan   Skin tear of left elbow without complication, initial encounter [S51.012A]  1. Skin tear of left elbow without complication, initial encounter  Tdap Vaccine greater than or equal to 8yo      2. Encounter for immunization  Tdap Vaccine greater than or equal to 8yo            Patient Instructions   Keep covered till tomorrow.  Make change dressing.  Keep moisturized but not wet or dry.  Look for signs of infections.    Follow up with PCP in 3-5 days.  Proceed to  ER if symptoms worsen.    If tests have been performed at [facility], our office will contact you with results if changes need to be made to the care plan discussed with you at the visit.  You can review your full results on [facility] MyChart.    Chief Complaint     Chief Complaint   Patient presents with    Wound Check     Skin tear to left arm earlier today, wife concerned for persistent bleeding, on thinners         History of Present Illness       Patient is a 75-year-old male with significant past medical history of pretension, CAD, CHF, on warfarin presents the office after sustaining skin tear to left forearm today.  States he was moving a piece of wood and caught his arm.  Minimal pain.  Stopped bleeding with a towel.  Did not clean the wound.  Per chart review, last tetanus .        Review of Systems   Review of Systems   Skin:  Positive for wound.         Current Medications       Current Outpatient Medications:     Accu-Chek Guide Test test strip, TEST ONCE A DAY (Patient not taking: Reported on 2025), Disp: 100 strip, Rfl: 1    Aspirin 81 MG CAPS, Take by mouth, Disp: , Rfl:     atorvastatin (LIPITOR) 80 mg tablet, TAKE 1 TABLET BY MOUTH EVERY EVENING, Disp: 90 tablet, Rfl: 1    DULoxetine (CYMBALTA) 60 mg delayed release capsule, TAKE 1 CAPSULE BY MOUTH EVERY DAY, Disp: 90  capsule, Rfl: 1    ezetimibe (ZETIA) 10 mg tablet, TAKE 1 TABLET BY MOUTH EVERY DAY, Disp: 90 tablet, Rfl: 3    gabapentin (NEURONTIN) 300 mg capsule, TAKE 1 CAPSULE BY MOUTH EVERYDAY AT BEDTIME (Patient taking differently: Take 300 mg by mouth 2 (two) times a day Takes 2 in am and 2 in the pm.), Disp: 90 capsule, Rfl: 1    lisinopril (ZESTRIL) 5 mg tablet, Take 1 tablet (5 mg total) by mouth daily, Disp: 90 tablet, Rfl: 3    metoprolol succinate (TOPROL-XL) 25 mg 24 hr tablet, Take 1 tablet (25 mg total) by mouth 2 (two) times a day, Disp: 180 tablet, Rfl: 3    mometasone (ELOCON) 0.1 % cream, APPLY TO AFFECTED AREA TOPICALLY EVERY DAY, Disp: 45 g, Rfl: 1    nitroglycerin (NITROSTAT) 0.4 mg SL tablet, Place 1 tablet (0.4 mg total) under the tongue every 5 (five) minutes as needed for chest pain, Disp: 30 tablet, Rfl: 0    potassium chloride (Klor-Con M20) 20 mEq tablet, Take 1 tablet (20 mEq total) by mouth daily, Disp: 90 tablet, Rfl: 3    tamsulosin (FLOMAX) 0.4 mg, TAKE 1 CAPSULE BY MOUTH EVERY DAY WITH DINNER, Disp: 90 capsule, Rfl: 1    torsemide (DEMADEX) 20 mg tablet, Take 1.5 tablets (30 mg total) by mouth daily, Disp: 45 tablet, Rfl: 0    warfarin (COUMADIN) 5 mg tablet, TAKE ONE-HALF TABLET BY MOUTH DAILY , EXCEPT TAKE 1 TABLET BY MOUTH ON WEDNESDAY AND SATURDAY, Disp: 58 tablet, Rfl: 1    Zepbound 2.5 MG/0.5ML auto-injector, Inject 5 mg under the skin once a week, Disp: , Rfl:     zolpidem (AMBIEN) 10 mg tablet, Take 1 tablet (10 mg total) by mouth daily at bedtime as needed for sleep, Disp: 90 tablet, Rfl: 0    Current Allergies     Allergies as of 05/03/2025 - Reviewed 05/03/2025   Allergen Reaction Noted    Morphine GI Intolerance 09/11/2018    Vitamin k Other (See Comments) 12/28/2020            The following portions of the patient's history were reviewed and updated as appropriate: allergies, current medications, past family history, past medical history, past social history, past surgical history  and problem list.     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, 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, on steroids for prolonged period    Ambulatory dysfunction     cane use    Anemia     Arthritis     Bleeding disorder ([facility])     BPH (benign prostatic hyperplasia)     CHF (congestive heart failure) ([facility])     Chronic kidney disease     stage 3b, baseline Cr 2.2    DDD (degenerative disc disease), cervical     DDD (degenerative disc disease), lumbar     Deep vein thrombosis ([facility])     Diverticulosis     Factor V Leiden ([facility])     Headache(784.0) 3/2022    Never till cervical  spine surgery    History of DVT (deep vein thrombosis)     from Facotr V, on Coumadin    [place] (hard of hearing)     Hyperlipidemia     Hypertension     Insomnia     Ischemic cardiomyopathy     EF 33%    MRSA carrier     Neuropathy     Obesity     WILL on CPAP     Osteoarthritis     Other acute osteomyelitis, other site ([facility]) 01/03/2023    Paroxysmal atrial fibrillation ([facility])     s/p ablation, s/p DCCV, Coumadin, on Amiodarone for    RBBB     Rotator cuff tear     right    Severe aortic stenosis     Steroid-induced hyperglycemia     requiring insulin, w/ MRSA infection 2022, resolved       Past Surgical History:   Procedure Laterality Date    BACK SURGERY      CARDIAC CATHETERIZATION N/A 04/09/2023    Procedure: Cardiac pci;  Surgeon: Bird Cast MD;  [facility]: [facility];  Service: Cardiology    CARDIAC CATHETERIZATION N/A 04/09/2023    Procedure: Cardiac Coronary Angiogram;  Surgeon: Bird Cast MD;  [facility];  Service: Cardiology    CARDIAC ELECTROPHYSIOLOGY PROCEDURE N/A 01/02/2025    Procedure: Cardiac eps/afib ablation PFA;  Surgeon: Derik Amin MD;  [facility]: [facility];  Service: Cardiology    CATARACT EXTRACTION Right     COLON SURGERY      COLONOSCOPY      INCISION AND DRAINAGE POSTERIOR SPINE N/A 04/01/2022     Procedure: Posterior cervical evacuation of postoperative collection and debridement with placement of drains C3-T1;  Surgeon: Rajeev Garcia MD;  Location: [facility];  Service: Neurosurgery    IR BIOPSY KIDNEY RANDOM  05/26/2022    IR TUNNELED DIALYSIS CATHETER PLACEMENT  05/23/2022    IR TUNNELED DIALYSIS CATHETER REMOVAL  06/02/2022    LUMBAR SPINE SURGERY      herniated disc    DC ARTHRD ANT INTERBODY MIN DSC CRV BELOW C2 N/A 03/11/2022    Procedure: Anterior cervical discectomy and fixation fusion C5/6 and C6/7; Posterior cervical decompression and instrumented fusion C3-T1;  Surgeon: Rajeev Garcia MD;  Location: [facility];  Service: Neurosurgery    DC ARTHRP KNE CONDYLE&PLATU MEDIAL&LAT COMPARTMENTS Right 04/08/2024    Procedure: ARTHROPLASTY KNEE TOTAL and all associated procedures;  Surgeon: Dot Galindo MD;  Location[facility];  Service: Orthopedics    DC ARTHRP KNE CONDYLE&PLATU MEDIAL&LAT COMPARTMENTS Left 09/23/2024    Procedure: ARTHROPLASTY KNEE TOTAL;  Surgeon: Dot Galindo MD;  [facility];  Service: Orthopedics    RENAL BIOPSY  6/2022    SPINE SURGERY  03/11/2022    Cervical myelopathy/ cervical fusion       Family History   Problem Relation Age of Onset    Lung cancer Mother     Hearing loss Father     Emphysema Sister     Heart attack Brother     Atrial fibrillation Brother     Heart failure Brother     Other (atrial fib) Brother     Clotting disorder Son          Medications have been verified.        Objective   /78 (BP Location: Right arm, Patient Position: Sitting)   Pulse 60   Temp 97.5 °F (36.4 °C) (Tympanic)   Resp 18   Wt 111 kg (244 lb 6.4 oz)   SpO2 99%   BMI 34.09 kg/m²   No LMP for male patient.       Physical Exam     Physical Exam  Vitals and nursing note reviewed.   Constitutional:       Appearance: He is well-developed.   HENT:      Head: Normocephalic and atraumatic.      Right Ear: External ear normal.      Left Ear: External  ear normal.      Nose: Nose normal.   Eyes:      General: Lids are normal.      Conjunctiva/sclera: Conjunctivae normal.   Skin:     General: Skin is warm and dry.      Capillary Refill: Capillary refill takes less than 2 seconds.      Findings: Signs of injury present.          Neurological:      Mental Status: He is alert.

## 2025-05-05 ENCOUNTER — OFFICE VISIT (OUTPATIENT)
Dept: PHYSICAL THERAPY | Facility: CLINIC | Age: 75
End: 2025-05-05
Attending: INTERNAL MEDICINE
Payer: COMMERCIAL

## 2025-05-05 DIAGNOSIS — M54.42 ACUTE BILATERAL LOW BACK PAIN WITH BILATERAL SCIATICA: Primary | ICD-10-CM

## 2025-05-05 DIAGNOSIS — M54.41 ACUTE BILATERAL LOW BACK PAIN WITH BILATERAL SCIATICA: Primary | ICD-10-CM

## 2025-05-05 DIAGNOSIS — M25.511 CHRONIC RIGHT SHOULDER PAIN: ICD-10-CM

## 2025-05-05 DIAGNOSIS — G89.29 CHRONIC RIGHT SHOULDER PAIN: ICD-10-CM

## 2025-05-05 PROCEDURE — 97112 NEUROMUSCULAR REEDUCATION: CPT | Performed by: PHYSICAL THERAPIST

## 2025-05-05 PROCEDURE — 97110 THERAPEUTIC EXERCISES: CPT | Performed by: PHYSICAL THERAPIST

## 2025-05-05 NOTE — PROGRESS NOTES
Daily Note     Today's date: 2025  Patient name: Tian Garcia  : 1950  MRN: [id number]  Referring provider: Ronnie Thorpe, PT  Dx:   Encounter Diagnosis     ICD-10-CM    1. Acute bilateral low back pain with bilateral sciatica  M54.42     M54.41       2. Chronic right shoulder pain  M25.511     G89.29                      Subjective:   Current Status: lower back continues to feel better - pain has fully resolved with exception of slight pain on (L) side radiating out to hip,  Shoulders are a little more achy today  New Symptoms/ Problems: NA  Response to Last Treatment: no adverse reaction to LV  HEP/ Activity Recommendations:  going well - no current questions   Progress Towards Goals: lower back is feeling better - still has pain at times     Objective: See treatment diary below      Assessment:   Stage: acute  Etiology: traumatic   Stability of Symptoms:  At Evaluation: Evolving - improving  Global Rating of Change:   Symptom Irritability Level: Moderate  Primary Movement System Diagnosis: Hypomobility  Primary Pain Phenotype: Neurogenic  Patient Specific Functional Scale:   25: return to walking without increased pain 0/10, lift and carry groceries into house 0/10, return to performing home projects 0/10; Total 0/30   Patient Acceptable Symptom State: unacceptable  FOTO Prediction: 49 by visit 13  FOTO Progress: 27 at evaluation   Greatest Concern: lack of function  Current Activity Plan: added to HEP ()  Current Educational Needs: appropriate activity modifications, appropriate activity progressions, timeframe for recovery, next steps to take to progress towards goals    Patient continues to respond well to manual treatment - lower back pain and mobility improved following - shoulder mobility also improved.  He continues to be challenged with shoulder exercises - slight soreness during.  Skilled PT continues to be required to guide progression of lumbar mobility and strength to allow him  "to return to regular exercise.       Plan: Continue with POC.  Monitor tolerance to last treatment and activity recommendations.  Follow up with me on 5/8/25.  Progress core strengthening with WB exercises and continue progressing shoulder strengthening exercises         Daily Treatment Diary     DX: Acute LBP   Follow Up with Referring Provider:   Precautions: NA  CO-MORBIDITIES: Hx of neck fusion, (B) TKA, A-fib, CHF       POC Expires Reeval for Medicare to be completed Unit Limit Auth Expiration Date PT/OT/STVisit Limit   6/9/25 By visit NA  NA 12/31/25 BOMN    Completed on visit                Auth Status DATE 4/17 4/29 5/2 5/5     NA Visit # 2 3 4 5      Remaining         MANUAL THERAPY         (R) Shoulder Mobs Post, Inf  Gr 2/3  4 min Post, Inf  Gr 2/3  2 min Post, Inf  Gr 2/3  2 min Post, Inf  Gr 2/3  2 min     (R) Shoulder PROM Flex, ABD, ER at 45  4 min Flex, ABD, ER at 60   3 min Flex, ABD, ER at 60   3 min Flex, ABD, ER at 60  3 min     Lumbar Mobs Held (R) S/L Rotation  Gr 3/4  3 min (R) S/L Rotation  Gr 3/4  3 min (R) S/L Rotation  Gr 3/4  3 min                                THERAPEUTIC EXERCISE HEP         UBE          Recumbent Bike  6 min 6 min 6 min 6 min     Pendulums 3/6         Pulley Stretches  Flex  Scaption  ABD          Mod Prone Sh' Row 3/6 6# 20x   6# 20x     Mod Prone Sh' Ext 3/6 4# 20x   4# 20x     Mod Prone Sh' ABD 3/6 2# 20x   2# 20x     Mod Prone Sh' Y  0# 20x   0# 20x     Supine Sh' AAROM Flex          S/L Sh' AAROM ABD          S/L Sh' AROM ER          Standing Sh' Flex           Standing Sh' Scaption          Standing Sh' ABD          Bicep Curls                     Abdominal Brace 4/14 5\"x10 5\"x10 5\"x10 5\"x10     Supine March 4/14  20x ea 20x ea 20x ea     H/L Hip ADD Isometric 4/14  5\"x20 5\"x20 5\"x20     BKFO 4/14         Bridge    5\"x20 5\"x20 5\"x20                         NEUROMUSCULAR REEDUCATION           Ball Stabilization at Wall    CW/CCW  20x ea CW/CCW  20x ea             "             TB Sh' Row          TB Sh' Ext          TB Sh' ER          TB Sh' IR                    FWD Mini Lunge          LAT Mini Lunge                                                  THERAPEUTIC ACTIVITY                                                  GAIT TRAINING                                                  MODALITIES

## 2025-05-06 ENCOUNTER — OFFICE VISIT (OUTPATIENT)
Dept: PAIN MEDICINE | Facility: CLINIC | Age: 75
End: 2025-05-06
Payer: COMMERCIAL

## 2025-05-06 VITALS — WEIGHT: 240 LBS | TEMPERATURE: 97.8 F | BODY MASS INDEX: 33.6 KG/M2 | HEIGHT: 71 IN

## 2025-05-06 DIAGNOSIS — Z98.890 STATUS POST LUMBAR SPINE SURGERY FOR DECOMPRESSION OF SPINAL CORD: ICD-10-CM

## 2025-05-06 DIAGNOSIS — Z79.01 CHRONIC ANTICOAGULATION: ICD-10-CM

## 2025-05-06 DIAGNOSIS — N18.32 STAGE 3B CHRONIC KIDNEY DISEASE (CKD) (HCC): ICD-10-CM

## 2025-05-06 DIAGNOSIS — M54.16 LEFT LUMBAR RADICULOPATHY: Primary | ICD-10-CM

## 2025-05-06 PROCEDURE — 99214 OFFICE O/P EST MOD 30 MIN: CPT | Performed by: ANESTHESIOLOGY

## 2025-05-06 NOTE — PROGRESS NOTES
Assessment  1. Left lumbar radiculopathy    2. Chronic anticoagulation    3. Stage 3b chronic kidney disease (CKD) (HCC)    4. Status post lumbar spine surgery for decompression of spinal cord        Plan      The patient's pain persists despite time, relative rest, activity modification, and nonsteroidal anti-inflammatories. His pain is significantly interfering with his daily living activities.  Is undergone a course of physical therapy.  It is appropriate to order an MRI of the lumbar spine to help evaluate any significant etiology of his symptoms.  I would normally order with and without contrast as he has prior lumbar spine surgery however he has renal disease.    Once we obtain MRI results, I will follow-up with the patient, review the results and current symptoms, and discuss the next steps of the treatment plan.    My impressions and treatment recommendations were discussed in detail with the patient who verbalized understanding and had no further questions.  Discharge instructions were provided. I personally saw and examined the patient and I agree with the above discussed plan of care.  This note is created using dictation transcription.  It may contain typographical errors, grammatical errors, improperly dictated words, background noise and other errors.    Orders Placed This Encounter   Procedures    MRI lumbar spine without contrast     Standing Status:   Future     Expected Date:   5/6/2025     Expiration Date:   5/6/2029     Scheduling Instructions:      There is no preparation for this test. Please leave your jewelry and valuables at home, wedding rings are the exception. All patients will be required to change into a hospital gown and pants.  Street clothes are not permitted in the MRI.  Magnetic nail polish must be removed prior to arrival for your test. Please bring your insurance cards, a form of photo ID and a list of your medications with you. Arrive 15 minutes prior to your appointment time in  order to register. Please bring any prior CT or MRI studies of this area that were not performed at a [facility] facility.            To schedule this appointment, please contact Central Scheduling at [phone] or [phone].            Prior to your appointment, please make sure you complete the MRI Screening Form when you e-Check in for your appointment. This will be available starting 7 days before your appointment in [employer]. You may receive an e-mail with an activation code if you do not have a [employer] account. If you do not have access to a device, we will complete your screening at your appointment.     Reason for Exam:   left lumbar radic, hx spine surgery-kidney disease     What is the patient's sedation requirement?:   No Sedation     Does the patient need medication for Claustrophobia? If yes, order medication at this point.:   No     Does the patient wear a life vest, have an implanted cardiac device, a stimulation device, a sleep apnea stimulator, or a breast tissue expansion device?:   Unknown     Release to patient through [employer]:   Immediate     No orders of the defined types were placed in this encounter.      History of Present Illness    Tian Garcia is a 75 y.o. male who presents with ongoing left lower extremity pain numbness and tingling.  Is undergone physical therapy but his pain persists which he rates as 8 out of 10 on the visual analog scale.  He also reports persistent left knee pain following with orthopedics.  His symptoms are constant could be shooting with numbness and he ambulates with a cane.  He denies any loss of bowel or bladder function.    I have personally reviewed and/or updated the patient's past medical history, past surgical history, family history, social history, current medications, allergies, and vital signs today.     Review of Systems   Constitutional:  Negative for fever and unexpected weight change.   HENT:  Negative for trouble swallowing.    Eyes:   Negative for visual disturbance.   Respiratory:  Negative for shortness of breath and wheezing.    Cardiovascular:  Negative for chest pain and palpitations.   Gastrointestinal:  Negative for constipation, diarrhea, nausea and vomiting.   Endocrine: Negative for cold intolerance, heat intolerance and polydipsia.   Genitourinary:  Negative for difficulty urinating and frequency.   Musculoskeletal:  Negative for arthralgias, gait problem, joint swelling and myalgias.   Skin:  Negative for rash.   Neurological:  Negative for dizziness, seizures, syncope, weakness and headaches.   Hematological:  Does not bruise/bleed easily.   Psychiatric/Behavioral:  Negative for dysphoric mood.    All other systems reviewed and are negative.      Patient Active Problem List   Diagnosis    Status post lumbar spine surgery for decompression of spinal cord    Primary hypertension    WILL (obstructive sleep apnea)    Factor V Leiden mutation ([facility])    Erectile dysfunction    Insomnia    Lumbar herniated disc    Primary osteoarthritis of left knee    Primary osteoarthritis of right knee    Paroxysmal A-fib/flutter ([facility])    Lower extremity edema    Mixed hyperlipidemia    History of DVT of lower extremity    IFG (impaired fasting glucose)    Pes anserinus bursitis of right knee    Cervical myelopathy ([facility])    Sinus bradycardia    Goals of care, counseling/discussion    Muscle spasm    Head pain cephalgia    Anemia    Surgical site infection    S/P cervical spinal fusion    Great toe pain, right    Ambulatory dysfunction    Other proteinuria    Glomerulonephritis    Urinary frequency    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    Chronic anticoagulation    Chronic pain of both knees    Other spondylosis with myelopathy, cervical region    Balance disorder    At high risk for injury related to fall    Obesity, morbid ([facility])    Anemia due to stage 3b chronic kidney disease  ([facility])    Peripheral vascular disease, unspecified ([facility])    Stage  3b chronic kidney disease (CKD) ([facility])    Atherosclerosis of native coronary artery of native heart without angina pectoris    Ischemic cardiomyopathy    History of ST elevation myocardial infarction (STEMI)    Severe aortic stenosis    Reactive depression    Atherosclerosis of aorta ([facility])    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[facility])    Status post total knee replacement using cement, right    Primary osteoarthritis of right shoulder    Status post total knee replacement using cement, left    Secondary hyperparathyroidism, not elsewhere classified ([facility])    Dilated cardiomyopathy ([facility])    Use of cane as ambulatory aid       Past Medical History:   Diagnosis Date    JANEL (acute kidney injury) ([facility]) 04/22/2022    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, 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, on steroids for prolonged period    Ambulatory dysfunction     cane use    Anemia     Arthritis     Bleeding disorder ([facility])     BPH (benign prostatic hyperplasia)     CHF (congestive heart failure) ([facility])     Chronic kidney disease     stage 3b, baseline Cr 2.2    DDD (degenerative disc disease), cervical     DDD (degenerative disc disease), lumbar     Deep vein thrombosis ([facility])     Diverticulosis     Factor V Leiden ([facility])     Headache(784.0) 3/2022    Never till cervical  spine surgery    History of DVT (deep vein thrombosis)     from Facotr V, on Coumadin    Jena (hard of hearing)     Hyperlipidemia     Hypertension     Insomnia     Ischemic cardiomyopathy     EF 33%    MRSA carrier     Neuropathy     Obesity     WILL on CPAP     Osteoarthritis     Other acute osteomyelitis, other site ([facility]) 01/03/2023    Paroxysmal atrial fibrillation ([facility])     s/p ablation, s/p DCCV, Coumadin, on Amiodarone for    RBBB     Rotator cuff tear     right    Severe aortic stenosis     Steroid-induced hyperglycemia     requiring insulin, w/ MRSA infection 2022, resolved       Past Surgical History:   Procedure Laterality Date    BACK SURGERY      CARDIAC  CATHETERIZATION N/A 04/09/2023    Procedure: Cardiac pci;  Surgeon: Bird Cast MD;  [facility];  Service: Cardiology    CARDIAC CATHETERIZATION N/A 04/09/2023    Procedure: Cardiac Coronary Angiogram;  Surgeon: Bird Cast MD;  [facility];  Service: Cardiology    CARDIAC ELECTROPHYSIOLOGY PROCEDURE N/A 01/02/2025    Procedure: Cardiac eps/afib ablation PFA;  Surgeon: Derik Amin MD;  [facility]: [facility];  Service: Cardiology    CATARACT EXTRACTION Right     COLON SURGERY      COLONOSCOPY      INCISION AND DRAINAGE POSTERIOR SPINE N/A 04/01/2022    Procedure: Posterior cervical evacuation of postoperative collection and debridement with placement of drains C3-T1;  Surgeon: Rajeev Garcai MD;  Location: [facility];  Service: Neurosurgery    IR BIOPSY KIDNEY RANDOM  05/26/2022    IR TUNNELED DIALYSIS CATHETER PLACEMENT  05/23/2022    IR TUNNELED DIALYSIS CATHETER REMOVAL  06/02/2022    LUMBAR SPINE SURGERY      herniated disc    NM ARTHRD ANT INTERBODY MIN DSC CRV BELOW C2 N/A 03/11/2022    Procedure: Anterior cervical discectomy and fixation fusion C5/6 and C6/7; Posterior cervical decompression and instrumented fusion C3-T1;  Surgeon: Rajeev Garcia MD;  Location: [facility];  Service: Neurosurgery    NM ARTHRP KNE CONDYLE&PLATU MEDIAL&LAT COMPARTMENTS Right 04/08/2024    Procedure: ARTHROPLASTY KNEE TOTAL and all associated procedures;  Surgeon: Dot Galindo MD;  Location[facility];  Service: Orthopedics    NM ARTHRP KNE CONDYLE&PLATU MEDIAL&LAT COMPARTMENTS Left 09/23/2024    Procedure: ARTHROPLASTY KNEE TOTAL;  Surgeon: Dot Galindo MD;  [facility];  Service: Orthopedics    RENAL BIOPSY  6/2022    SPINE SURGERY  03/11/2022    Cervical myelopathy/ cervical fusion       Family History   Problem Relation Age of Onset    Lung cancer Mother     Hearing loss Father     Emphysema Sister     Heart attack Brother      Atrial fibrillation Brother     Heart failure Brother     Other (atrial fib) Brother     Clotting disorder Son        Social History     Occupational History    Occupation: Retired   Tobacco Use    Smoking status: Never    Smokeless tobacco: Never   Vaping Use    Vaping status: Never Used   Substance and Sexual Activity    Alcohol use: Not Currently    Drug use: No    Sexual activity: Not Currently     Partners: Female       Current Outpatient Medications on File Prior to Visit   Medication Sig    Aspirin 81 MG CAPS Take by mouth    atorvastatin (LIPITOR) 80 mg tablet TAKE 1 TABLET BY MOUTH EVERY EVENING    DULoxetine (CYMBALTA) 60 mg delayed release capsule TAKE 1 CAPSULE BY MOUTH EVERY DAY    ezetimibe (ZETIA) 10 mg tablet TAKE 1 TABLET BY MOUTH EVERY DAY    gabapentin (NEURONTIN) 300 mg capsule TAKE 1 CAPSULE BY MOUTH EVERYDAY AT BEDTIME    lisinopril (ZESTRIL) 5 mg tablet Take 1 tablet (5 mg total) by mouth daily    metoprolol succinate (TOPROL-XL) 25 mg 24 hr tablet Take 1 tablet (25 mg total) by mouth 2 (two) times a day    mometasone (ELOCON) 0.1 % cream APPLY TO AFFECTED AREA TOPICALLY EVERY DAY    nitroglycerin (NITROSTAT) 0.4 mg SL tablet Place 1 tablet (0.4 mg total) under the tongue every 5 (five) minutes as needed for chest pain    potassium chloride (Klor-Con M20) 20 mEq tablet Take 1 tablet (20 mEq total) by mouth daily    tamsulosin (FLOMAX) 0.4 mg TAKE 1 CAPSULE BY MOUTH EVERY DAY WITH DINNER    torsemide (DEMADEX) 20 mg tablet Take 1.5 tablets (30 mg total) by mouth daily    warfarin (COUMADIN) 5 mg tablet TAKE ONE-HALF TABLET BY MOUTH DAILY , EXCEPT TAKE 1 TABLET BY MOUTH ON WEDNESDAY AND SATURDAY    Zepbound 2.5 MG/0.5ML auto-injector Inject 5 mg under the skin once a week    zolpidem (AMBIEN) 10 mg tablet Take 1 tablet (10 mg total) by mouth daily at bedtime as needed for sleep    Accu-Chek Guide Test test strip TEST ONCE A DAY (Patient not taking: Reported on 4/24/2025)     No current  "facility-administered medications on file prior to visit.       Allergies   Allergen Reactions    Morphine GI Intolerance    Vitamin K Other (See Comments)     On coumadin-patient sensitive to vitamin K amounts in meds etc.       Physical Exam    Temp 97.8 °F (36.6 °C)   Ht 5' 11\" (1.803 m)   Wt 109 kg (240 lb)   BMI 33.47 kg/m²     Constitutional: normal, well developed, well nourished, alert, in no distress and non-toxic and no overt pain behavior. and overweight  Eyes: anicteric  HEENT: grossly intact  Neck: supple, symmetric, trachea midline and no masses   Pulmonary:even and unlabored  Cardiovascular:No edema or pitting edema present  Skin:Normal without rashes or lesions and well hydrated  Psychiatric:Mood and affect appropriate  Neurologic:Cranial Nerves II-XII grossly intact  Musculoskeletal:normal and ambulates with cane, no focal motor deficit or deficit he has an unsteady gait deep tendon reflexes are absent but symmetrical    Imaging  XR SPINE LUMBAR MINIMUM 4 VIEWS NON INJURY @  4-17-25     INDICATION: M54.50: Low back pain, unspecified.     COMPARISON: Lumbar spine x-ray 1/25/2025     FINDINGS:     No acute fracture. Intact pedicles.     Five non-rib-bearing lumbar vertebral bodies.     Grade 1 retrolisthesis of L1 on L2, L2 on L3, L3 on L4.     Intervertebral disc space narrowing at L1-2 to L5-S1 with endplate osteophytes and facet arthropathy.     Vascular calcifications.     IMPRESSION:     No acute osseous abnormality.     Degenerative changes as described.      I have personally reviewed pertinent films in PACS and my interpretation is retrolisthesis with lower lumbar disc degeneration.  Hemoglobin A1C 6.5 @  4-17-25      "

## 2025-05-08 ENCOUNTER — OFFICE VISIT (OUTPATIENT)
Dept: CARDIOLOGY CLINIC | Facility: CLINIC | Age: 75
End: 2025-05-08
Payer: COMMERCIAL

## 2025-05-08 ENCOUNTER — OFFICE VISIT (OUTPATIENT)
Dept: PHYSICAL THERAPY | Facility: CLINIC | Age: 75
End: 2025-05-08
Attending: INTERNAL MEDICINE
Payer: COMMERCIAL

## 2025-05-08 VITALS
SYSTOLIC BLOOD PRESSURE: 122 MMHG | HEART RATE: 70 BPM | BODY MASS INDEX: 33.74 KG/M2 | OXYGEN SATURATION: 96 % | WEIGHT: 241 LBS | DIASTOLIC BLOOD PRESSURE: 66 MMHG | HEIGHT: 71 IN

## 2025-05-08 DIAGNOSIS — M54.42 ACUTE BILATERAL LOW BACK PAIN WITH BILATERAL SCIATICA: Primary | ICD-10-CM

## 2025-05-08 DIAGNOSIS — G89.29 CHRONIC RIGHT SHOULDER PAIN: ICD-10-CM

## 2025-05-08 DIAGNOSIS — I35.0 NONRHEUMATIC AORTIC VALVE STENOSIS: ICD-10-CM

## 2025-05-08 DIAGNOSIS — M54.41 ACUTE BILATERAL LOW BACK PAIN WITH BILATERAL SCIATICA: Primary | ICD-10-CM

## 2025-05-08 DIAGNOSIS — I25.5 ISCHEMIC CARDIOMYOPATHY: Primary | ICD-10-CM

## 2025-05-08 DIAGNOSIS — I25.10 ATHEROSCLEROSIS OF NATIVE CORONARY ARTERY OF NATIVE HEART WITHOUT ANGINA PECTORIS: ICD-10-CM

## 2025-05-08 DIAGNOSIS — I48.0 PAROXYSMAL A-FIB (HCC): ICD-10-CM

## 2025-05-08 DIAGNOSIS — M25.511 CHRONIC RIGHT SHOULDER PAIN: ICD-10-CM

## 2025-05-08 PROCEDURE — 97110 THERAPEUTIC EXERCISES: CPT | Performed by: PHYSICAL THERAPIST

## 2025-05-08 PROCEDURE — 97112 NEUROMUSCULAR REEDUCATION: CPT | Performed by: PHYSICAL THERAPIST

## 2025-05-08 PROCEDURE — 99214 OFFICE O/P EST MOD 30 MIN: CPT | Performed by: INTERNAL MEDICINE

## 2025-05-08 NOTE — PROGRESS NOTES
Cardiology Outpatient Follow-Up Note - Tian Garcia 75 y.o. male MRN: [id number]      Assessment/Plan:    1. Ischemic cardiomyopathy (Primary)  Improved LVEF on current regimen  Continue metoprolol XL and lisinopril  Continue torsemide 30 mg daily, he is euvolemic and compensated    2. Nonrheumatic aortic valve stenosis  Pseudosevere by DSE Dec 2024. Will continue to monitor.   Repeat echo March 2025    3. Atherosclerosis of native coronary artery of native heart without angina pectoris  Without angina. LDL is 78 mg/dL, above goal. Ideally would have LDL < 55 mg/dL. Already on max dose atorvastatin and ezetimibe.     4. Paroxysmal A-fib/flutter (HCC)  Now s/p ablation  Off amiodarone  Continue metoprolol XL 25 mg BID  Continue warfarin for goal INR 2-3      We will see Tian Garcia back in 6 month for routine follow-up.    Subjective:     HPI: Tian Garcia is a 75 y.o. year old male  with paroxysmal atrial fibrillation s/p ablation, HFmEF 2/2 ICMP, hypertension, moderate aortic stenosis, CKD 4, CAD and STEMI April 2023 with PCI to the proximal to mid LAD.   He presents today for follow-up.    There was concern for LFLG severe AS and he was sent for DSE, which showed pseudosevere AS (moderate) in Dec 2024.  Sent to EP to consider ablation for AF management; had ablation with Dr. Amin in Jan 2025.   Repeat echo March 2025 showed LVEF improved to 50-55%.     He has been on torsemide 30 mg daily. Overall, he is up from 228 lbs when last seen by me 11/8/24 to 240 lbs today. When last seen by me he was on torsemide 20 mg daily; it was increased by his PCP to 30 mg daily, briefly to 60 mg daily, and back now to 30 mg daily. Otherwise no complaints.     Cardiac Testing:    Echo 3/4/25  Interpretation Summary  Show Result Comparison     Left Ventricle: Left ventricular cavity size is upper normal. Wall thickness is mildly increased. The left ventricular ejection fraction is 50-55%. Systolic function is low normal.  Diastolic function is moderately abnormal, consistent with grade II (pseudonormal) relaxation.    The following segments are hypokinetic: basal inferior and mid inferior.    All other segments are normal.    Right Ventricle: Right ventricular cavity size is mildly dilated. Systolic function is normal.    Left Atrium: The atrium is moderately dilated.    Right Atrium: The atrium is mildly dilated.    Aortic Valve: The aortic valve is trileaflet. The leaflets are moderately calcified. There is moderately reduced mobility. There is mild regurgitation. There is moderate stenosis. The aortic valve mean gradient is 21 mmHg. The dimensionless velocity index is 0.33. The aortic valve area is 1.13 cm2.    Mitral Valve: There is mild annular calcification. There is mild regurgitation.    Tricuspid Valve: There is mild regurgitation.        Echo Dobutamine Stress 12/31/24     Baseline echocardiogram:   Moderately reduced LV systolic function. Moderate aortic stenosis with mean gradient 13 mmHg, peak velocity 2.3 m/s, and calculated EDELMIRA 1.0 cm2. The calculated Stroke volume index 23 mL/beat/m2.     Peak stress echocardiogram - dobutamine 20 mcg/kg/min:  Augmentation of all myocardial segments. The Stroke volume index improved to 30 mL/beat/m2. Moderate aortic stenosis with a mean gradient of 19 mmHg, peak velocity 3.0 m/s, and calculated EDELMIRA 1.4 cm2.           Echo 8/12/24  Interpretation Summary  Show Result Comparison     Left Ventricle: Left ventricular cavity size is moderately dilated. Wall thickness is normal. The left ventricular ejection fraction is 37% by biplane measurement. Systolic function is normal. There is moderate global hypokinesis.    IVS: Septal motion is not well visualized.    Right Ventricle: Right ventricular cavity size is mildly dilated. Systolic function is normal.    Left Atrium: The atrium is moderately dilated (42-48 mL/m2).    Right Atrium: The atrium is mildly dilated.    Aortic Valve: The aortic  valve is trileaflet. The leaflets are moderately thickened. The leaflets are moderately calcified. There is moderately reduced mobility. There is mild regurgitation. There is moderate stenosis. The aortic valve peak velocity is 2.36 m/s. The aortic valve mean gradient is 13 mmHg. The dimensionless velocity index is 0.32. The aortic valve area is 1.32 cm2. The stroke volume index is 24.20 ml/m2. The aortic valve velocity is increased due to stenosis but lower than expected due to the presence of decreased flow.    Mitral Valve: There is mild annular calcification. There is mild regurgitation.    Prior TTE study available for comparison. Prior study date: 6/9/2023. Changes noted when compared to prior study. Changes include: LVEF is now moderately reduced. .        Echo 6/9/23     Interpretation Summary  Show Result Comparison     Left Ventricle: Left ventricular cavity size is normal. The left ventricular ejection fraction is 55-60% by biplane measurement. Systolic function is normal.    The following segments are hypokinetic: basal inferior.    All other segments are normal.    Aortic Valve: There is moderate stenosis.     Marked recovery of LV systolic function compared to prior study dated 04/10/2022.           Cardiac Cath 4/9/23 Impression         Successful PCI w/ non-overlapping ROBERT x2 of sequential prox & mid LAD culprit lesions    Residual mid RCA 80% diffuse lesion    Echo 4/10/23 Interpretation Summary         Left Ventricle: Left ventricular cavity size is normal. Wall thickness is moderately increased. There is moderate concentric hypertrophy. The left ventricular ejection fraction is 30-35%. Global longitudinal strain is reduced at -6%. There is moderate global hypokinesis with severe hypokinesis of the mid anterior, mid anteroseptal, mid anterolateral, and all apical segments. There is regional variation of the inferior wall. Diastolic function is moderately abnormal, consistent with grade II  (pseudonormal) relaxation.    Right Ventricle: Wall motion is abnormal. There is severe hypokinesis of the apical free wall.    Mitral Valve: There is mild regurgitation.    Aortic Valve: There is moderate stenosis. The aortic valve velocity is increased due to stenosis but lower than expected due to the presence of decreased flow.       Echo 6/9/23  Interpretation Summary     Left Ventricle: Left ventricular cavity size is normal. The left ventricular ejection fraction is 55-60% by biplane measurement. Systolic function is normal.    The following segments are hypokinetic: basal inferior.    All other segments are normal.    Aortic Valve: There is moderate stenosis.     Marked recovery of LV systolic function compared to prior study dated 04/10/2022.          EKGs, personally reviewed:  EKG in the office 9/28/2023 shows sinus bradycardia, 58 bpm, normal axis,  ms RBBB, otherwise normal intervals.  Abnormal study.    EKG 2/28/24 - sinus bradycardia, 52 bpm, normal axis, RBBB  ms. Abnormal study.     7/3/24 - atrial fibrillation, 82 bpm, RBBB, nonspecific T wave abnormalities, abnormal study    Relevant Labs & Results:  CMP 5/4/23 --  Cr 2.5, K 4.1  Lipid panel 4/9/23 -- , , HDL 45,  mg/dL -- on pravastatin 40 mg   LDL direct 4/9/23 -- 139 mg/dL     CMP 2/26/24 - Cr 2.17, GFR 31, K 4.2  Lipid panel 10/10/23 - LDL 90 mg/dL   CBC 1/3/24 - Hgb 12.7 g/dL      CMP 8/12/24 - K 4.8, Cr 2.09    BMP 4/28/25 - K 4.5, Cr 2.20  Lipid panel 4/17/25 - , , HDL 61, LDL 78 mg/dL    ROS:  Review of Systems:  Review of Systems    Objective:     Vitals:   There were no vitals filed for this visit.     There is no height or weight on file to calculate BSA.  Wt Readings from Last 3 Encounters:   05/06/25 109 kg (240 lb)   05/03/25 111 kg (244 lb 6.4 oz)   04/24/25 114 kg (250 lb 9.6 oz)       Physical Exam:    General: Tian Garcia is a well appearing male, in no acute distress, sitting  comfortably  HEENT: moist mucous membranes, EOMI  Neck:  No JVD, supple, trachea midline  Cardiovascular: unremarkable S1/S2, regular rate and rhythm,3/6 SM RUSB  Pulmonary: normal respiratory effort, CTAB  Abdomen: soft and nondistended  Extremities: No lower extremity edema. Warm and well perfused extremities.  Neuro: no focal motor deficits, AAOx3 (person, place, time)  Psych: Normal mood and affect, cooperative        Medications (at the START of this encounter):  Outpatient Medications Prior to Visit   Medication Sig Dispense Refill    Accu-Chek Guide Test test strip TEST ONCE A DAY (Patient not taking: Reported on 4/24/2025) 100 strip 1    Aspirin 81 MG CAPS Take by mouth      atorvastatin (LIPITOR) 80 mg tablet TAKE 1 TABLET BY MOUTH EVERY EVENING 90 tablet 1    DULoxetine (CYMBALTA) 60 mg delayed release capsule TAKE 1 CAPSULE BY MOUTH EVERY DAY 90 capsule 1    ezetimibe (ZETIA) 10 mg tablet TAKE 1 TABLET BY MOUTH EVERY DAY 90 tablet 3    gabapentin (NEURONTIN) 300 mg capsule TAKE 1 CAPSULE BY MOUTH EVERYDAY AT BEDTIME 90 capsule 1    lisinopril (ZESTRIL) 5 mg tablet Take 1 tablet (5 mg total) by mouth daily 90 tablet 3    metoprolol succinate (TOPROL-XL) 25 mg 24 hr tablet Take 1 tablet (25 mg total) by mouth 2 (two) times a day 180 tablet 3    mometasone (ELOCON) 0.1 % cream APPLY TO AFFECTED AREA TOPICALLY EVERY DAY 45 g 1    nitroglycerin (NITROSTAT) 0.4 mg SL tablet Place 1 tablet (0.4 mg total) under the tongue every 5 (five) minutes as needed for chest pain 30 tablet 0    potassium chloride (Klor-Con M20) 20 mEq tablet Take 1 tablet (20 mEq total) by mouth daily 90 tablet 3    tamsulosin (FLOMAX) 0.4 mg TAKE 1 CAPSULE BY MOUTH EVERY DAY WITH DINNER 90 capsule 1    torsemide (DEMADEX) 20 mg tablet Take 1.5 tablets (30 mg total) by mouth daily 45 tablet 0    warfarin (COUMADIN) 5 mg tablet TAKE ONE-HALF TABLET BY MOUTH DAILY , EXCEPT TAKE 1 TABLET BY MOUTH ON WEDNESDAY AND SATURDAY 58 tablet 1     "Zepbound 2.5 MG/0.5ML auto-injector Inject 5 mg under the skin once a week      zolpidem (AMBIEN) 10 mg tablet Take 1 tablet (10 mg total) by mouth daily at bedtime as needed for sleep 90 tablet 0     No facility-administered medications prior to visit.           This note was completed in part utilizing Dragon [employer] voice recognition software. Grammatical errors, random word insertion, spelling mistakes, occasional wrong word or \"sound-alike\" substitutions and incomplete sentences may be an occasional consequence of the system secondary to software limitations, ambient noise and hardware issues. At the time of dictation, efforts were made to edit, clarify and /or correct errors.  Please read the chart carefully and recognize, using context, where substitutions have occurred.  If you have any questions or concerns about the context, text or information contained within the body of this dictation, please contact myself, the provider, for further clarification.    "

## 2025-05-08 NOTE — PROGRESS NOTES
Daily Note     Today's date: 2025  Patient name: Tian Garcia  : 1950  MRN: [id number]  Referring provider: Ronnie Thorpe, PT  Dx:   Encounter Diagnosis     ICD-10-CM    1. Acute bilateral low back pain with bilateral sciatica  M54.42     M54.41       2. Chronic right shoulder pain  M25.511     G89.29                      Subjective:   Current Status: lower back continues to feel better - pain has fully resolved with exception of slight pain on (L) side radiating out to hip,  Shoulders are a little more achy today  New Symptoms/ Problems: NA  Response to Last Treatment: no adverse reaction to LV  HEP/ Activity Recommendations:  going well - no current questions   Progress Towards Goals: lower back is feeling better - still has pain at times     Objective: See treatment diary below      Assessment:   Stage: acute  Etiology: traumatic   Stability of Symptoms:  At Evaluation: Evolving - improving  Global Rating of Change:   Symptom Irritability Level: Moderate  Primary Movement System Diagnosis: Hypomobility  Primary Pain Phenotype: Neurogenic  Patient Specific Functional Scale:   25: return to walking without increased pain 0/10, lift and carry groceries into house 0/10, return to performing home projects 0/10; Total 0/30   Patient Acceptable Symptom State: unacceptable  FOTO Prediction: 49 by visit 13  FOTO Progress: 27 at evaluation   Greatest Concern: lack of function  Current Activity Plan: added to HEP ()  Current Educational Needs: appropriate activity modifications, appropriate activity progressions, timeframe for recovery, next steps to take to progress towards goals    Patient continues to respond well to manual treatment.  He demonstrated improved tolerance to WB exercises today.  He continues to be challenged with current level of exercise for shoulders.  Skilled PT continues to be required to guide progression of lumbar mobility and strength to allow him to return to regular  "exercise.       Plan: Continue with POC.  Monitor tolerance to last treatment and activity recommendations.  Follow up with me on 5/12/25.  Progress core strengthening with WB exercises and continue progressing shoulder strengthening exercises         Daily Treatment Diary     DX: Acute LBP   Follow Up with Referring Provider:   Precautions: NA  CO-MORBIDITIES: Hx of neck fusion, (B) TKA, A-fib, CHF       POC Expires Reeval for Medicare to be completed Unit Limit Auth Expiration Date PT/OT/STVisit Limit   6/9/25 By visit NA  NA 12/31/25 BOMN    Completed on visit                Auth Status DATE 5/2 5/5 5/8      NA Visit # 4 5 6       Remaining         MANUAL THERAPY         (R) Shoulder Mobs Post, Inf  Gr 2/3  2 min Post, Inf  Gr 2/3  2 min Post, Inf  Gr 2/3  2 min      (R) Shoulder PROM Flex, ABD, ER at 60   3 min Flex, ABD, ER at 60  3 min Flex, ABD, ER at 60  3 min      Lumbar Mobs (R) S/L Rotation  Gr 3/4  3 min (R) S/L Rotation  Gr 3/4  3 min (R) S/L Rotation  Gr 3/4  3 min                                 THERAPEUTIC EXERCISE HEP         UBE          Recumbent Bike  6 min 6 min 6 min      Pendulums 3/6         Pulley Stretches  Flex  Scaption  ABD          Mod Prone Sh' Row 3/6  6# 20x 6# 20x      Mod Prone Sh' Ext 3/6  4# 20x 4# 20x      Mod Prone Sh' ABD 3/6  2# 20x 2# 20x      Mod Prone Sh' Y   0# 20x 0# 20x      Supine Sh' AAROM Flex          S/L Sh' AAROM ABD          S/L Sh' AROM ER          Standing Sh' Flex           Standing Sh' Scaption          Standing Sh' ABD          Bicep Curls                     Abdominal Brace 4/14 5\"x10 5\"x10 5\"x10      Supine March 4/14 20x ea 20x ea 20x ea      H/L Hip ADD Isometric 4/14 5\"x20 5\"x20 5\"x20      BKFO 4/14         Bridge   5\"x20 5\"x20 5\"x20                          NEUROMUSCULAR REEDUCATION           Ball Stabilization at Wall  CW/CCW  20x ea CW/CCW  20x ea CW/CCW  20x ea                          TB Sh' Row          TB Sh' Ext          TB Sh' ER          TB " Sh' IR                    FWD Mini Lunge    15x ea      LAT Mini Lunge          Mini Squat    10x                                    THERAPEUTIC ACTIVITY                                                  GAIT TRAINING                                                  MODALITIES

## 2025-05-12 ENCOUNTER — OFFICE VISIT (OUTPATIENT)
Dept: PHYSICAL THERAPY | Facility: CLINIC | Age: 75
End: 2025-05-12
Attending: INTERNAL MEDICINE
Payer: COMMERCIAL

## 2025-05-12 DIAGNOSIS — G89.29 CHRONIC RIGHT SHOULDER PAIN: ICD-10-CM

## 2025-05-12 DIAGNOSIS — M54.42 ACUTE BILATERAL LOW BACK PAIN WITH BILATERAL SCIATICA: Primary | ICD-10-CM

## 2025-05-12 DIAGNOSIS — M25.511 CHRONIC RIGHT SHOULDER PAIN: ICD-10-CM

## 2025-05-12 DIAGNOSIS — M54.41 ACUTE BILATERAL LOW BACK PAIN WITH BILATERAL SCIATICA: Primary | ICD-10-CM

## 2025-05-12 PROCEDURE — 97110 THERAPEUTIC EXERCISES: CPT | Performed by: PHYSICAL THERAPIST

## 2025-05-12 PROCEDURE — 97112 NEUROMUSCULAR REEDUCATION: CPT | Performed by: PHYSICAL THERAPIST

## 2025-05-12 NOTE — PROGRESS NOTES
Daily Note     Today's date: 2025  Patient name: Tian Garcia  : 1950  MRN: [id number]  Referring provider: Ronnie Thorpe, PT  Dx:   Encounter Diagnosis     ICD-10-CM    1. Acute bilateral low back pain with bilateral sciatica  M54.42     M54.41       2. Chronic right shoulder pain  M25.511     G89.29                      Subjective:   Current Status: lower back is feeling good - shoulder continues to be painful at times - most difficulty is with steps because of (L) thigh weakness  New Symptoms/ Problems: NA  Response to Last Treatment: no adverse reaction to LV  HEP/ Activity Recommendations:  going well - no current questions   Progress Towards Goals: lower back is feeling better - still has pain at times     Objective: See treatment diary below      Assessment:   Stage: acute  Etiology: traumatic   Stability of Symptoms:  At Evaluation: Evolving - improving  Global Rating of Change:   Symptom Irritability Level: Moderate  Primary Movement System Diagnosis: Hypomobility  Primary Pain Phenotype: Neurogenic  Patient Specific Functional Scale:   25: return to walking without increased pain 0/10, lift and carry groceries into house 0/10, return to performing home projects 0/10; Total 0/30   Patient Acceptable Symptom State: unacceptable  FOTO Prediction: 49 by visit 13  FOTO Progress: 27 at evaluation   Greatest Concern: lack of function  Current Activity Plan: added to HEP ()  Current Educational Needs: appropriate activity modifications, appropriate activity progressions, timeframe for recovery, next steps to take to progress towards goals    Patient continues to progress well with manual treatment.  He had good tolerance to exercises.  He is making good progress but remains limited with (L) hip pain and strength.  Skilled PT continues to be required to guide progression of lumbar mobility and strength to allow him to return to regular exercise.       Plan: Continue with POC.  Monitor  "tolerance to last treatment and activity recommendations.  Follow up with me on 5/15/25.  Progress core strengthening with WB exercises and continue progressing shoulder strengthening exercises         Daily Treatment Diary     DX: Acute LBP   Follow Up with Referring Provider:   Precautions: NA  CO-MORBIDITIES: Hx of neck fusion, (B) TKA, A-fib, CHF       POC Expires Reeval for Medicare to be completed Unit Limit Auth Expiration Date PT/OT/STVisit Limit   6/9/25 By visit NA  NA 12/31/25 BOMN    Completed on visit                Auth Status DATE 5/2 5/5 5/8 5/12     NA Visit # 4 5 6 7      Remaining         MANUAL THERAPY         (R) Shoulder Mobs Post, Inf  Gr 2/3  2 min Post, Inf  Gr 2/3  2 min Post, Inf  Gr 2/3  2 min Post, Inf  Gr 2/3  2 min     (R) Shoulder PROM Flex, ABD, ER at 60   3 min Flex, ABD, ER at 60  3 min Flex, ABD, ER at 60  3 min Flex, ABD, ER at 60  3 min     Lumbar Mobs (R) S/L Rotation  Gr 3/4  3 min (R) S/L Rotation  Gr 3/4  3 min (R) S/L Rotation  Gr 3/4  3 min (R) S/L Rotation  Gr 3/4  3 min                                THERAPEUTIC EXERCISE HEP         UBE          Recumbent Bike  6 min 6 min 6 min 6 min     Pendulums 3/6         Pulley Stretches  Flex  Scaption  ABD          Mod Prone Sh' Row 3/6  6# 20x 6# 20x 6# 20x     Mod Prone Sh' Ext 3/6  4# 20x 4# 20x 4# 20x     Mod Prone Sh' ABD 3/6  2# 20x 2# 20x 3# 20x     Mod Prone Sh' Y   0# 20x 0# 20x 2# 20x     Supine Sh' AAROM Flex          S/L Sh' AAROM ABD          S/L Sh' AROM ER          Standing Sh' Flex           Standing Sh' Scaption          Standing Sh' ABD          Bicep Curls                     Abdominal Brace 4/14 5\"x10 5\"x10 5\"x10 5\"x10     Supine March 4/14 20x ea 20x ea 20x ea 20x ea     H/L Hip ADD Isometric 4/14 5\"x20 5\"x20 5\"x20 5\"x20     BKFO 4/14         Bridge   5\"x20 5\"x20 5\"x20 5\"x20                         NEUROMUSCULAR REEDUCATION           Ball Stabilization at Wall  CW/CCW  20x ea CW/CCW  20x ea CW/CCW  20x ea " CW/CCW  20x ea                         TB Sh' Row          TB Sh' Ext          TB Sh' ER          TB Sh' IR                    FWD Mini Lunge    15x ea      LAT Mini Lunge          Mini Squat    10x                                    THERAPEUTIC ACTIVITY                                                  GAIT TRAINING                                                  MODALITIES

## 2025-05-13 ENCOUNTER — OFFICE VISIT (OUTPATIENT)
Dept: NEPHROLOGY | Facility: HOSPITAL | Age: 75
End: 2025-05-13
Payer: COMMERCIAL

## 2025-05-13 VITALS
HEIGHT: 71 IN | WEIGHT: 237 LBS | DIASTOLIC BLOOD PRESSURE: 74 MMHG | OXYGEN SATURATION: 99 % | BODY MASS INDEX: 33.18 KG/M2 | SYSTOLIC BLOOD PRESSURE: 130 MMHG | HEART RATE: 69 BPM

## 2025-05-13 DIAGNOSIS — E21.1 SECONDARY HYPERPARATHYROIDISM, NOT ELSEWHERE CLASSIFIED (HCC): ICD-10-CM

## 2025-05-13 DIAGNOSIS — N13.8 BPH WITH OBSTRUCTION/LOWER URINARY TRACT SYMPTOMS: ICD-10-CM

## 2025-05-13 DIAGNOSIS — N40.1 BPH WITH OBSTRUCTION/LOWER URINARY TRACT SYMPTOMS: ICD-10-CM

## 2025-05-13 DIAGNOSIS — N18.32 STAGE 3B CHRONIC KIDNEY DISEASE (CKD) (HCC): Primary | ICD-10-CM

## 2025-05-13 DIAGNOSIS — R80.8 OTHER PROTEINURIA: ICD-10-CM

## 2025-05-13 DIAGNOSIS — R60.9 EDEMA, UNSPECIFIED TYPE: ICD-10-CM

## 2025-05-13 DIAGNOSIS — N02.B9 IGA NEPHROPATHY DETERMINED BY BIOPSY OF KIDNEY: ICD-10-CM

## 2025-05-13 DIAGNOSIS — I10 PRIMARY HYPERTENSION: ICD-10-CM

## 2025-05-13 DIAGNOSIS — N18.32 ANEMIA DUE TO STAGE 3B CHRONIC KIDNEY DISEASE  (HCC): ICD-10-CM

## 2025-05-13 DIAGNOSIS — D63.1 ANEMIA DUE TO STAGE 3B CHRONIC KIDNEY DISEASE  (HCC): ICD-10-CM

## 2025-05-13 PROBLEM — R39.15 URINARY URGENCY: Status: ACTIVE | Noted: 2025-05-13

## 2025-05-13 PROCEDURE — 99214 OFFICE O/P EST MOD 30 MIN: CPT | Performed by: INTERNAL MEDICINE

## 2025-05-13 RX ORDER — TAMSULOSIN HYDROCHLORIDE 0.4 MG/1
0.8 CAPSULE ORAL
Qty: 90 CAPSULE | Refills: 1 | Status: SHIPPED | OUTPATIENT
Start: 2025-05-13

## 2025-05-13 NOTE — ASSESSMENT & PLAN NOTE
- UpCr in setting of IgAN shows UpCr improved from 18.8g to microalb:Cr essentially zero  -resolved  -monitor UpCr and microalb:Cr

## 2025-05-13 NOTE — ASSESSMENT & PLAN NOTE
- BP acceptable for age/CKD in office.  -Continue lisinopril 5mg daily, metoprolol 25mg twice daily, flomax, torsemide 30mg daily

## 2025-05-13 NOTE — ASSESSMENT & PLAN NOTE
JANEL d/t IgAN, biopsy proven, in setting of recent MRSA infection. Suspect CKD stage 3b  -renal biopsy performed May 26, 2022 was limited as only 6 intact glomeruli were present for evaluation on light microscopy.  IgA dominant immune complex deposition noted by immunofluorescence.  Differential includes IgA nephropathy both primary and secondary forms versus infectious associated glomerulonephritis.  Staph aureus infections have been associated with IgA dominant immune complex deposition.  Patient did have MRSA surgical site infection so infection associated GN should be considered.  -prednisone 60 mg daily begun June 2nd, 2022. S/p prednisone taper, completed 9/1/22  -monitor CMP, CBC   -if this was a primary IgA nephropathy, it would be compatible with Ritchie classification of M0 E1 S0 T1-C1.  -of 38 glomeruli, 6 were intact, forehead global glomerulosclerosis, segmental sclerosis was absent.  Moderate interstitial fibrosis and tubular atrophy as well as arterial intimal fibrosis and mild arterolar hyalinosis were noted  -did not require dialysis inpatient, permacath removed prior to discharge  -sCr 2.2 as of 4/28/25, slowly improving from 5.34 and seems to have stabilized. 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 Thankfully, proteinuria has significantly improved, last UpCr 335mg/g(has risen) but microalbuminuria zero  -recommend CKD education - referral placed at prior visit. Call her when you are ready to do this.

## 2025-05-13 NOTE — PROGRESS NOTES
NEPHROLOGY OUTPATIENT PROGRESS NOTE   Tian Garcia 75 y.o. male MRN: [id number]  DATE: 5/13/2025  Reason for visit:   Chief Complaint   Patient presents with    Follow-up    Chronic Kidney Disease        Patient Instructions   RTC in 4 months.  Obtain labs in 3 months.  Increase hydration.  Kidney function overall stable. Remains at stage 3b CKD.  Microalbumin essentially zero, which is significantly improved s/p diagnosis of IgA Nephropathy. Does have rising total protein which is likely tubular and requires ongoing monitoring but no significant change in medication regimen. Already on lisinopril 5mg daily.  Should call for CKD education. Referral placed.       Assessment & Plan  Stage 3b chronic kidney disease (CKD) ([facility], [facility])  JANEL d/t IgAN, biopsy proven, in setting of recent MRSA infection. Suspect CKD stage 3b  -renal biopsy performed May 26, 2022 was limited as only 6 intact glomeruli were present for evaluation on light microscopy.  IgA dominant immune complex deposition noted by immunofluorescence.  Differential includes IgA nephropathy both primary and secondary forms versus infectious associated glomerulonephritis.  Staph aureus infections have been associated with IgA dominant immune complex deposition.  Patient did have MRSA surgical site infection so infection associated GN should be considered.  -prednisone 60 mg daily begun June 2nd, 2022. S/p prednisone taper, completed 9/1/22  -monitor CMP, CBC   -if this was a primary IgA nephropathy, it would be compatible with Schuyler classification of M0 E1 S0 T1-C1.  -of 38 glomeruli, 6 were intact, forehead global glomerulosclerosis, segmental sclerosis was absent.  Moderate interstitial fibrosis and tubular atrophy as well as arterial intimal fibrosis and mild arterolar hyalinosis were noted  -did not require dialysis inpatient, permacath removed prior to discharge  -sCr 2.2 as of 4/28/25, slowly improving from 5.34 and seems to have stabilized. 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 Thankfully, proteinuria has significantly improved, last UpCr 335mg/g(has risen) but microalbuminuria zero  -recommend CKD education - referral placed at prior visit. Call her when you are ready to do this.  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  -as above  Primary hypertension  - BP acceptable for age/CKD in office.  -Continue lisinopril 5mg daily, metoprolol 25mg twice daily, flomax, torsemide 30mg daily   Secondary hyperparathyroidism, not elsewhere classified (HCC)  -PTH 66 as of 4/1/25, monitor this  Anemia due to stage 3b chronic kidney disease  (HCC)  -Hgb 12.4, much improved, monitor CBC  Other proteinuria  - UpCr in setting of IgAN shows UpCr improved from 18.8g to microalb:Cr essentially zero  -resolved  -monitor UpCr and microalb:Cr  Edema, unspecified type  -continue torsemide as above  -monitor weight  BPH with obstruction/lower urinary tract symptoms  -on tamsulosin 0.4mg at night  -will increase to 0.8mg at night  -monitor symptoms    RTC in 4 months.  Obtain labs in 3 months.  Increase hydration.  Kidney function overall stable. Remains at stage 3b CKD.  Microalbumin essentially zero, which is significantly improved s/p diagnosis of IgA Nephropathy. Does have rising total protein which is likely tubular and requires ongoing monitoring but no significant change in medication regimen. Already on lisinopril 5mg daily.    SUBJECTIVE / INTERVAL HISTORY:  75 y.o. male presents in follow up of CKD.     Tian Garcia denies any recent illness/hospitalizations/medication  "changes since last office visit.    Denies NSAID use.  BP well controlled.  Has occasional leg edema. Does take extra 10mg torsemide. Does like pickles.     Review of Systems   Constitutional:  Negative for chills and fever.   HENT:  Negative for sore throat.    Eyes:  Negative for visual disturbance.   Respiratory:  Negative for cough and shortness of breath.    Cardiovascular:  Positive for leg swelling. Negative for chest pain.   Gastrointestinal:  Negative for abdominal pain, constipation, diarrhea, nausea and vomiting.   Endocrine: Negative for polyuria.   Genitourinary:  Positive for urgency. Negative for decreased urine volume, difficulty urinating, dysuria and hematuria.   Musculoskeletal:  Positive for back pain (lower back left side hurts). Negative for myalgias.   Skin:  Negative for rash.   Neurological:  Negative for dizziness, light-headedness and numbness.   Psychiatric/Behavioral:  Negative for confusion.        OBJECTIVE:  /74 (BP Location: Left arm, Patient Position: Sitting, Cuff Size: Adult)   Pulse 69   Ht 5' 11\" (1.803 m)   Wt 108 kg (237 lb)   SpO2 99%   BMI 33.05 kg/m²  Body mass index is 33.05 kg/m².    Physical exam:  Physical Exam  Vitals reviewed.   Constitutional:       General: He is not in acute distress.     Appearance: Normal appearance. He is well-developed. He is not diaphoretic.   HENT:      Head: Normocephalic and atraumatic.      Nose: Nose normal.      Mouth/Throat:      Pharynx: No oropharyngeal exudate.   Eyes:      General: No scleral icterus.        Right eye: No discharge.         Left eye: No discharge.   Neck:      Thyroid: No thyromegaly.   Cardiovascular:      Rate and Rhythm: Normal rate and regular rhythm.      Heart sounds: Murmur heard.   Pulmonary:      Effort: Pulmonary effort is normal.      Breath sounds: Normal breath sounds. No wheezing or rales.   Abdominal:      General: Bowel sounds are normal. There is no distension.      Palpations: Abdomen is " soft.      Tenderness: There is no abdominal tenderness.   Musculoskeletal:         General: No swelling. Normal range of motion.      Cervical back: Neck supple.   Lymphadenopathy:      Cervical: No cervical adenopathy.   Skin:     General: Skin is warm and dry.      Coloration: Skin is not jaundiced.      Findings: No rash.   Neurological:      General: No focal deficit present.      Mental Status: He is alert.      Comments: awake   Psychiatric:         Mood and Affect: Mood normal.         Behavior: Behavior normal.         Medications:    Current Outpatient Medications:     Aspirin 81 MG CAPS, Take by mouth, Disp: , Rfl:     atorvastatin (LIPITOR) 80 mg tablet, TAKE 1 TABLET BY MOUTH EVERY EVENING, Disp: 90 tablet, Rfl: 1    DULoxetine (CYMBALTA) 60 mg delayed release capsule, TAKE 1 CAPSULE BY MOUTH EVERY DAY, Disp: 90 capsule, Rfl: 1    ezetimibe (ZETIA) 10 mg tablet, TAKE 1 TABLET BY MOUTH EVERY DAY, Disp: 90 tablet, Rfl: 3    gabapentin (NEURONTIN) 300 mg capsule, TAKE 1 CAPSULE BY MOUTH EVERYDAY AT BEDTIME, Disp: 90 capsule, Rfl: 1    lisinopril (ZESTRIL) 5 mg tablet, Take 1 tablet (5 mg total) by mouth daily, Disp: 90 tablet, Rfl: 3    metoprolol succinate (TOPROL-XL) 25 mg 24 hr tablet, Take 1 tablet (25 mg total) by mouth 2 (two) times a day, Disp: 180 tablet, Rfl: 3    mometasone (ELOCON) 0.1 % cream, APPLY TO AFFECTED AREA TOPICALLY EVERY DAY, Disp: 45 g, Rfl: 1    nitroglycerin (NITROSTAT) 0.4 mg SL tablet, Place 1 tablet (0.4 mg total) under the tongue every 5 (five) minutes as needed for chest pain, Disp: 30 tablet, Rfl: 0    potassium chloride (Klor-Con M20) 20 mEq tablet, Take 1 tablet (20 mEq total) by mouth daily, Disp: 90 tablet, Rfl: 3    tamsulosin (FLOMAX) 0.4 mg, Take 2 capsules (0.8 mg total) by mouth daily with dinner, Disp: 90 capsule, Rfl: 1    torsemide (DEMADEX) 20 mg tablet, Take 1.5 tablets (30 mg total) by mouth daily, Disp: 45 tablet, Rfl: 0    warfarin (COUMADIN) 5 mg tablet,  "TAKE ONE-HALF TABLET BY MOUTH DAILY , EXCEPT TAKE 1 TABLET BY MOUTH ON WEDNESDAY AND SATURDAY, Disp: 58 tablet, Rfl: 1    Zepbound 2.5 MG/0.5ML auto-injector, Inject 5 mg under the skin once a week, Disp: , Rfl:     zolpidem (AMBIEN) 10 mg tablet, Take 1 tablet (10 mg total) by mouth daily at bedtime as needed for sleep, Disp: 90 tablet, Rfl: 0    Accu-Chek Guide Test test strip, TEST ONCE A DAY (Patient not taking: Reported on 4/24/2025), Disp: 100 strip, Rfl: 1    Allergies:  Allergies as of 05/13/2025 - Reviewed 05/13/2025   Allergen Reaction Noted    Morphine GI Intolerance 09/11/2018    Vitamin k Other (See Comments) 12/28/2020       The following portions of the patient's history were reviewed and updated as appropriate: past family history, past surgical history and problem list.    Laboratory Results:  Lab Results   Component Value Date    SODIUM 136 04/28/2025    K 4.5 04/28/2025    CL 96 04/28/2025    CO2 22 04/28/2025    BUN 60 (H) 04/28/2025    CREATININE 2.20 (H) 04/28/2025    GLUC 134 (H) 04/28/2025    CALCIUM 8.9 01/03/2025        Lab Results   Component Value Date    CALCIUM 8.9 01/03/2025    PHOS 3.6 05/22/2023       Portions of the record may have been created with voice recognition software.  Occasional wrong word or \"sound a like\" substitutions may have occurred due to the inherent limitations of voice recognition software.  Read the chart carefully and recognize, using context, where substitutions have occurred.  "

## 2025-05-13 NOTE — PATIENT INSTRUCTIONS
RTC in 4 months.  Obtain labs in 3 months.  Increase hydration.  Kidney function overall stable. Remains at stage 3b CKD.  Microalbumin essentially zero, which is significantly improved s/p diagnosis of IgA Nephropathy. Does have rising total protein which is likely tubular and requires ongoing monitoring but no significant change in medication regimen. Already on lisinopril 5mg daily.  Should call for CKD education. Referral placed.

## 2025-05-14 ENCOUNTER — TELEPHONE (OUTPATIENT)
Dept: FAMILY MEDICINE CLINIC | Facility: HOSPITAL | Age: 75
End: 2025-05-14

## 2025-05-14 NOTE — TELEPHONE ENCOUNTER
Patient called the RX Refill Line. Message is being forwarded to the office.     Patient is requesting a new prescription for gabapentin 300 mg. Patient stated that he is taking 1 capsule 2 times a day and the directions need to be changed. Please review. Patient uses  [facility]/pharmacy #[id number] - [place], [place] - [address]     Please contact patient at [phone] with any further questions

## 2025-05-15 ENCOUNTER — OFFICE VISIT (OUTPATIENT)
Dept: PHYSICAL THERAPY | Facility: CLINIC | Age: 75
End: 2025-05-15
Attending: INTERNAL MEDICINE
Payer: COMMERCIAL

## 2025-05-15 DIAGNOSIS — M54.42 ACUTE BILATERAL LOW BACK PAIN WITH BILATERAL SCIATICA: Primary | ICD-10-CM

## 2025-05-15 DIAGNOSIS — M25.511 CHRONIC RIGHT SHOULDER PAIN: ICD-10-CM

## 2025-05-15 DIAGNOSIS — M54.41 ACUTE BILATERAL LOW BACK PAIN WITH BILATERAL SCIATICA: Primary | ICD-10-CM

## 2025-05-15 DIAGNOSIS — G89.29 CHRONIC RIGHT SHOULDER PAIN: ICD-10-CM

## 2025-05-15 DIAGNOSIS — R51.9 INTRACTABLE HEADACHE, UNSPECIFIED CHRONICITY PATTERN, UNSPECIFIED HEADACHE TYPE: ICD-10-CM

## 2025-05-15 PROCEDURE — 97110 THERAPEUTIC EXERCISES: CPT | Performed by: PHYSICAL THERAPIST

## 2025-05-15 PROCEDURE — 97112 NEUROMUSCULAR REEDUCATION: CPT | Performed by: PHYSICAL THERAPIST

## 2025-05-15 RX ORDER — GABAPENTIN 300 MG/1
300 CAPSULE ORAL 2 TIMES DAILY
Qty: 180 CAPSULE | Refills: 1 | Status: SHIPPED | OUTPATIENT
Start: 2025-05-15

## 2025-05-15 NOTE — PROGRESS NOTES
Daily Note     Today's date: 5/15/2025  Patient name: Tian Garcia  : 1950  MRN: [id number]  Referring provider: Ronnie Thorpe, PT  Dx:   Encounter Diagnosis     ICD-10-CM    1. Acute bilateral low back pain with bilateral sciatica  M54.42     M54.41       2. Chronic right shoulder pain  M25.511     G89.29                      Subjective:   Current Status: lower back is feeling better today - shoulder is still a little sore at time - biggest limiting factor is thigh strength  New Symptoms/ Problems: NA  Response to Last Treatment: no adverse reaction to LV  HEP/ Activity Recommendations:  going well - no current questions   Progress Towards Goals: lower back is feeling better - still has pain at times     Objective: See treatment diary below      Assessment:   Stage: acute  Etiology: traumatic   Stability of Symptoms:  At Evaluation: Evolving - improving  Global Rating of Change:   Symptom Irritability Level: Moderate  Primary Movement System Diagnosis: Hypomobility  Primary Pain Phenotype: Neurogenic  Patient Specific Functional Scale:   25: return to walking without increased pain 0/10, lift and carry groceries into house 0/10, return to performing home projects 0/10; Total 0/30   Patient Acceptable Symptom State: unacceptable  FOTO Prediction: 49 by visit 13  FOTO Progress: 27 at evaluation   Greatest Concern: lack of function  Current Activity Plan: added to HEP ()  Current Educational Needs: appropriate activity modifications, appropriate activity progressions, timeframe for recovery, next steps to take to progress towards goals    Patient had good tolerance to manual treatment - noted less pain following.  He demonstrated good form and recall with exercises.  At this time it is recommended that he continue with an I HEP - he is to contact me if he has return of symptoms or any questions/ concerns.          Plan: DC to I HEP - evaluate hip and thigh next visit          Daily Treatment Diary  "    DX: Acute LBP   Follow Up with Referring Provider:   Precautions: NA  CO-MORBIDITIES: Hx of neck fusion, (B) TKA, A-fib, CHF       POC Expires Reeval for Medicare to be completed Unit Limit Auth Expiration Date PT/OT/STVisit Limit   6/9/25 By visit NA  NA 12/31/25 BOMN    Completed on visit                Auth Status DATE 5/2 5/5 5/8 5/12 5/15    NA Visit # 4 5 6 7 8     Remaining         MANUAL THERAPY         (R) Shoulder Mobs Post, Inf  Gr 2/3  2 min Post, Inf  Gr 2/3  2 min Post, Inf  Gr 2/3  2 min Post, Inf  Gr 2/3  2 min Post, Inf  Gr 2/3  2 min    (R) Shoulder PROM Flex, ABD, ER at 60   3 min Flex, ABD, ER at 60  3 min Flex, ABD, ER at 60  3 min Flex, ABD, ER at 60  3 min Flex, ABD, ER at 60  3 min    Lumbar Mobs (R) S/L Rotation  Gr 3/4  3 min (R) S/L Rotation  Gr 3/4  3 min (R) S/L Rotation  Gr 3/4  3 min (R) S/L Rotation  Gr 3/4  3 min (R) S/L   Rotation  Gr 3/4  3 min                               THERAPEUTIC EXERCISE HEP         UBE          Recumbent Bike  6 min 6 min 6 min 6 min 6 min    Pendulums 3/6         Pulley Stretches  Flex  Scaption  ABD          Mod Prone Sh' Row 3/6  6# 20x 6# 20x 6# 20x 6# 20x    Mod Prone Sh' Ext 3/6  4# 20x 4# 20x 4# 20x 4# 20x    Mod Prone Sh' ABD 3/6  2# 20x 2# 20x 3# 20x 3# 20x    Mod Prone Sh' Y   0# 20x 0# 20x 2# 20x 2# 20x    Supine Sh' AAROM Flex          S/L Sh' AAROM ABD          S/L Sh' AROM ER          Standing Sh' Flex           Standing Sh' Scaption          Standing Sh' ABD          Bicep Curls                     Abdominal Brace 4/14 5\"x10 5\"x10 5\"x10 5\"x10 5\"x10    Supine March 4/14 20x ea 20x ea 20x ea 20x ea 20x ea    H/L Hip ADD Isometric 4/14 5\"x20 5\"x20 5\"x20 5\"x20 5\"x20    BKFO 4/14         Bridge   5\"x20 5\"x20 5\"x20 5\"x20 5\"x20                        NEUROMUSCULAR REEDUCATION           Ball Stabilization at Wall  CW/CCW  20x ea CW/CCW  20x ea CW/CCW  20x ea CW/CCW  20x ea CW/CCW  20x ea                        TB Sh' Row          TB Sh' Ext       "    TB Sh' ER          TB Sh' IR                    FWD Mini Lunge    15x ea      LAT Mini Lunge          Mini Squat    10x                                    THERAPEUTIC ACTIVITY                                                  GAIT TRAINING                                                  MODALITIES

## 2025-06-01 DIAGNOSIS — G47.00 INSOMNIA, UNSPECIFIED TYPE: ICD-10-CM

## 2025-06-02 ENCOUNTER — TELEPHONE (OUTPATIENT)
Age: 75
End: 2025-06-02

## 2025-06-02 ENCOUNTER — HOSPITAL ENCOUNTER (OUTPATIENT)
Dept: MRI IMAGING | Facility: HOSPITAL | Age: 75
Discharge: HOME | End: 2025-06-02
Attending: ANESTHESIOLOGY
Payer: COMMERCIAL

## 2025-06-02 ENCOUNTER — HOSPITAL ENCOUNTER (EMERGENCY)
Facility: HOSPITAL | Age: 75
Discharge: HOME | End: 2025-06-02
Attending: EMERGENCY MEDICINE | Admitting: EMERGENCY MEDICINE
Payer: COMMERCIAL

## 2025-06-02 ENCOUNTER — APPOINTMENT (EMERGENCY)
Dept: RADIOLOGY | Facility: HOSPITAL | Age: 75
End: 2025-06-02
Payer: COMMERCIAL

## 2025-06-02 VITALS
DIASTOLIC BLOOD PRESSURE: 59 MMHG | SYSTOLIC BLOOD PRESSURE: 113 MMHG | OXYGEN SATURATION: 99 % | HEART RATE: 68 BPM | RESPIRATION RATE: 17 BRPM | TEMPERATURE: 97.7 F

## 2025-06-02 DIAGNOSIS — I95.9 HYPOTENSION: ICD-10-CM

## 2025-06-02 DIAGNOSIS — R42 LIGHTHEADEDNESS: Primary | ICD-10-CM

## 2025-06-02 DIAGNOSIS — D64.9 CHRONIC ANEMIA: ICD-10-CM

## 2025-06-02 DIAGNOSIS — R79.89 ELEVATED SERUM CREATININE: ICD-10-CM

## 2025-06-02 DIAGNOSIS — M54.16 LEFT LUMBAR RADICULOPATHY: ICD-10-CM

## 2025-06-02 LAB
2HR DELTA HS TROPONIN: 0 NG/L
ALBUMIN SERPL BCG-MCNC: 3.9 G/DL (ref 3.5–5)
ALP SERPL-CCNC: 78 U/L (ref 34–104)
ALT SERPL W P-5'-P-CCNC: 14 U/L (ref 7–52)
ANION GAP SERPL CALCULATED.3IONS-SCNC: 11 MMOL/L (ref 4–13)
APTT PPP: 37 SECONDS (ref 23–34)
AST SERPL W P-5'-P-CCNC: 19 U/L (ref 13–39)
BACTERIA UR QL AUTO: ABNORMAL /HPF
BASOPHILS # BLD AUTO: 0.02 THOUSANDS/ÂΜL (ref 0–0.1)
BASOPHILS NFR BLD AUTO: 0 % (ref 0–1)
BILIRUB SERPL-MCNC: 0.71 MG/DL (ref 0.2–1)
BILIRUB UR QL STRIP: NEGATIVE
BNP SERPL-MCNC: 31 PG/ML (ref 0–100)
BUN SERPL-MCNC: 44 MG/DL (ref 5–25)
CALCIUM SERPL-MCNC: 9.1 MG/DL (ref 8.4–10.2)
CARDIAC TROPONIN I PNL SERPL HS: 9 NG/L (ref ?–50)
CARDIAC TROPONIN I PNL SERPL HS: 9 NG/L (ref ?–50)
CHLORIDE SERPL-SCNC: 99 MMOL/L (ref 96–108)
CLARITY UR: CLEAR
CO2 SERPL-SCNC: 26 MMOL/L (ref 21–32)
COLOR UR: ABNORMAL
CREAT SERPL-MCNC: 2.51 MG/DL (ref 0.6–1.3)
EOSINOPHIL # BLD AUTO: 0.06 THOUSAND/ÂΜL (ref 0–0.61)
EOSINOPHIL NFR BLD AUTO: 1 % (ref 0–6)
ERYTHROCYTE [DISTWIDTH] IN BLOOD BY AUTOMATED COUNT: 13.2 % (ref 11.6–15.1)
GFR SERPL CREATININE-BSD FRML MDRD: 24 ML/MIN/1.73SQ M
GLUCOSE SERPL-MCNC: 116 MG/DL (ref 65–140)
GLUCOSE UR STRIP-MCNC: NEGATIVE MG/DL
HCT VFR BLD AUTO: 35 % (ref 36.5–49.3)
HGB BLD-MCNC: 11.4 G/DL (ref 12–17)
HGB UR QL STRIP.AUTO: ABNORMAL
IMM GRANULOCYTES # BLD AUTO: 0.03 THOUSAND/UL (ref 0–0.2)
IMM GRANULOCYTES NFR BLD AUTO: 1 % (ref 0–2)
INR PPP: 3.72 (ref 0.85–1.19)
KETONES UR STRIP-MCNC: NEGATIVE MG/DL
LACTATE SERPL-SCNC: 1.5 MMOL/L (ref 0.5–2)
LEUKOCYTE ESTERASE UR QL STRIP: NEGATIVE
LYMPHOCYTES # BLD AUTO: 1.19 THOUSANDS/ÂΜL (ref 0.6–4.47)
LYMPHOCYTES NFR BLD AUTO: 20 % (ref 14–44)
MCH RBC QN AUTO: 30.6 PG (ref 26.8–34.3)
MCHC RBC AUTO-ENTMCNC: 32.6 G/DL (ref 31.4–37.4)
MCV RBC AUTO: 94 FL (ref 82–98)
MONOCYTES # BLD AUTO: 0.5 THOUSAND/ÂΜL (ref 0.17–1.22)
MONOCYTES NFR BLD AUTO: 8 % (ref 4–12)
MUCOUS THREADS UR QL AUTO: ABNORMAL
NEUTROPHILS # BLD AUTO: 4.22 THOUSANDS/ÂΜL (ref 1.85–7.62)
NEUTS SEG NFR BLD AUTO: 70 % (ref 43–75)
NITRITE UR QL STRIP: NEGATIVE
NON-SQ EPI CELLS URNS QL MICRO: ABNORMAL /HPF
NRBC BLD AUTO-RTO: 0 /100 WBCS
PH UR STRIP.AUTO: 7 [PH]
PLATELET # BLD AUTO: 211 THOUSANDS/UL (ref 149–390)
PMV BLD AUTO: 9.7 FL (ref 8.9–12.7)
POTASSIUM SERPL-SCNC: 4.5 MMOL/L (ref 3.5–5.3)
PROCALCITONIN SERPL-MCNC: 0.1 NG/ML
PROT SERPL-MCNC: 6.5 G/DL (ref 6.4–8.4)
PROT UR STRIP-MCNC: NEGATIVE MG/DL
PROTHROMBIN TIME: 36.8 SECONDS (ref 12.3–15)
RBC # BLD AUTO: 3.72 MILLION/UL (ref 3.88–5.62)
RBC #/AREA URNS AUTO: ABNORMAL /HPF
SODIUM SERPL-SCNC: 136 MMOL/L (ref 135–147)
SP GR UR STRIP.AUTO: 1.01 (ref 1–1.03)
TSH SERPL DL<=0.05 MIU/L-ACNC: 2 UIU/ML (ref 0.45–4.5)
UROBILINOGEN UR STRIP-ACNC: <2 MG/DL
WBC # BLD AUTO: 6.02 THOUSAND/UL (ref 4.31–10.16)
WBC #/AREA URNS AUTO: ABNORMAL /HPF

## 2025-06-02 PROCEDURE — 36415 COLL VENOUS BLD VENIPUNCTURE: CPT | Performed by: PHYSICIAN ASSISTANT

## 2025-06-02 PROCEDURE — 84443 ASSAY THYROID STIM HORMONE: CPT | Performed by: PHYSICIAN ASSISTANT

## 2025-06-02 PROCEDURE — 81001 URINALYSIS AUTO W/SCOPE: CPT | Performed by: PHYSICIAN ASSISTANT

## 2025-06-02 PROCEDURE — 87040 BLOOD CULTURE FOR BACTERIA: CPT | Performed by: PHYSICIAN ASSISTANT

## 2025-06-02 PROCEDURE — 72148 MRI LUMBAR SPINE W/O DYE: CPT

## 2025-06-02 PROCEDURE — 99285 EMERGENCY DEPT VISIT HI MDM: CPT | Performed by: PHYSICIAN ASSISTANT

## 2025-06-02 PROCEDURE — 71045 X-RAY EXAM CHEST 1 VIEW: CPT

## 2025-06-02 PROCEDURE — 83605 ASSAY OF LACTIC ACID: CPT | Performed by: PHYSICIAN ASSISTANT

## 2025-06-02 PROCEDURE — 85730 THROMBOPLASTIN TIME PARTIAL: CPT | Performed by: PHYSICIAN ASSISTANT

## 2025-06-02 PROCEDURE — 96360 HYDRATION IV INFUSION INIT: CPT

## 2025-06-02 PROCEDURE — 84145 PROCALCITONIN (PCT): CPT | Performed by: PHYSICIAN ASSISTANT

## 2025-06-02 PROCEDURE — 85025 COMPLETE CBC W/AUTO DIFF WBC: CPT | Performed by: PHYSICIAN ASSISTANT

## 2025-06-02 PROCEDURE — 93005 ELECTROCARDIOGRAM TRACING: CPT

## 2025-06-02 PROCEDURE — 85610 PROTHROMBIN TIME: CPT | Performed by: PHYSICIAN ASSISTANT

## 2025-06-02 PROCEDURE — 84484 ASSAY OF TROPONIN QUANT: CPT | Performed by: PHYSICIAN ASSISTANT

## 2025-06-02 PROCEDURE — 99284 EMERGENCY DEPT VISIT MOD MDM: CPT

## 2025-06-02 PROCEDURE — 80053 COMPREHEN METABOLIC PANEL: CPT | Performed by: PHYSICIAN ASSISTANT

## 2025-06-02 PROCEDURE — 83880 ASSAY OF NATRIURETIC PEPTIDE: CPT | Performed by: PHYSICIAN ASSISTANT

## 2025-06-02 RX ORDER — ZOLPIDEM TARTRATE 10 MG/1
10 TABLET ORAL
Qty: 90 TABLET | Refills: 0 | Status: SHIPPED | OUTPATIENT
Start: 2025-06-02

## 2025-06-02 RX ADMIN — SODIUM CHLORIDE 1000 ML: 0.9 INJECTION, SOLUTION INTRAVENOUS at 15:10

## 2025-06-02 NOTE — DISCHARGE INSTRUCTIONS
Rest, increase fluids.  Follow up with PCP in 2-3 days for recheck.  Return to ER if symptoms worsen.  You will need to have your BUN and Creatinine rechecked by PCP.  It was slightly higher than your normal baseline.

## 2025-06-02 NOTE — TELEPHONE ENCOUNTER
Pt's wife called to inform that pt is in ER at [facility] for dizziness/near-syncope.

## 2025-06-02 NOTE — ED PROVIDER NOTES
Time reflects when diagnosis was documented in both MDM as applicable and the Disposition within this note       Time User Action Codes Description Comment    6/2/2025  5:26 PM Gayathri Donohue Add [R42] Lightheadedness     6/2/2025  5:27 PM Gayathri Donohue Add [I95.9] Hypotension     6/2/2025  5:27 PM Gayathri Donohue Add [D64.9] Chronic anemia     6/2/2025  5:27 PM Gayathri Donohue Add [R79.89] Elevated serum creatinine           ED Disposition       ED Disposition   Discharge    Condition   Stable    Date/Time   Mon Jun 2, 2025  5:26 PM    Comment   Tian Garcia discharge to home/self care.                   Assessment & Plan       Medical Decision Making  Patient with lightheadedness while standing, will check labs, EKG, to r/o electrolyte abnormality, dehydration, cardiac arrhythmia, ACS.  Patient with hypotension upon arrival, improved with IV fluids.  Patient with elevated Cr, slightly higher than baseline, given IV fluids in the ED, will d/c with close f/u with PCP. Wife concerned his BP meds are too high.  I instructed patient to monitor BP twice a day and f/u with PCP as needed for recheck.  Return precautions given.     Amount and/or Complexity of Data Reviewed  External Data Reviewed: labs and ECG.  Labs: ordered. Decision-making details documented in ED Course.  Radiology: ordered.  ECG/medicine tests: ordered and independent interpretation performed.        ED Course as of 06/02/25 1834   Mon Jun 02, 2025   1524 Hemoglobin(!): 11.4  Chronic anemia       Medications   sodium chloride 0.9 % bolus 1,000 mL (0 mL Intravenous Stopped 6/2/25 1630)       ED Risk Strat Scores   HEART Risk Score      Flowsheet Row Most Recent Value   Heart Score Risk Calculator    History 0 Filed at: 06/02/2025 1833   ECG 0 Filed at: 06/02/2025 1833   Age 2 Filed at: 06/02/2025 1833   Risk Factors 2 Filed at: 06/02/2025 1833   Troponin 0 Filed at: 06/02/2025 1833   HEART Score 4 Filed at: 06/02/2025 1833           HEART Risk Score      Flowsheet Row Most Recent Value   Heart Score Risk Calculator    History 0 Filed at: 06/02/2025 1833   ECG 0 Filed at: 06/02/2025 1833   Age 2 Filed at: 06/02/2025 1833   Risk Factors 2 Filed at: 06/02/2025 1833   Troponin 0 Filed at: 06/02/2025 1833   HEART Score 4 Filed at: 06/02/2025 1833                      No data recorded        SBIRT 22yo+      Flowsheet Row Most Recent Value   Initial Alcohol Screen: US AUDIT-C     1. How often do you have a drink containing alcohol? 0 Filed at: 06/02/2025 1457   2. How many drinks containing alcohol do you have on a typical day you are drinking?  0 Filed at: 06/02/2025 1457   3a. Male UNDER 65: How often do you have five or more drinks on one occasion? 0 Filed at: 06/02/2025 1457   3b. FEMALE Any Age, or MALE 65+: How often do you have 4 or more drinks on one occassion? 0 Filed at: 06/02/2025 1457   Audit-C Score 0 Filed at: 06/02/2025 1457   JONATHAN: How many times in the past year have you...    Used an illegal drug or used a prescription medication for non-medical reasons? Never Filed at: 06/02/2025 1457                            History of Present Illness       Chief Complaint   Patient presents with    Dizziness     Pt to ED for lightheadedness that started suddenly today, improved and came back again  when standing. Feels back to baseline        Past Medical History[1]   Past Surgical History[2]   Family History[3]   Social History[4]   E-Cigarette/Vaping    E-Cigarette Use Never User       E-Cigarette/Vaping Substances    Nicotine No     THC No     CBD No     Flavoring No     Other No     Unknown No       I have reviewed and agree with the history as documented.     Patient is a 74 y/o M with h/o afib was BIBA for lightheadedness.  [place] and he felt lightheaded while standing at the desk.  He states he layed his head against the window.  He denies syncope.  He states when he sat down  he felt better.  When EMS arrived his BP was low.  Patient states he is feeling much better now.  He states he had this happen in the past and had to decrease his BP meds.  Patient states he did not have anything to drink all day except for a cup of coffee. He denies recent illness.  No vomiting or diarrhea.        History provided by:  Patient  Dizziness  Associated symptoms: no chest pain, no diarrhea, no headaches, no nausea, no palpitations, no shortness of breath, no vomiting and no weakness        Review of Systems   Constitutional:  Negative for chills and fever.   HENT: Negative.     Respiratory:  Negative for cough and shortness of breath.    Cardiovascular:  Negative for chest pain, palpitations and leg swelling.   Gastrointestinal:  Negative for abdominal pain, diarrhea, nausea and vomiting.   Genitourinary:  Negative for dysuria.   Musculoskeletal:  Negative for back pain and neck pain.   Skin:  Negative for color change, pallor, rash and wound.   Neurological:  Positive for light-headedness. Negative for dizziness, tremors, syncope, facial asymmetry, speech difficulty, weakness, numbness and headaches.   Psychiatric/Behavioral:  Negative for confusion.    All other systems reviewed and are negative.          Objective       ED Triage Vitals   Temperature Pulse Blood Pressure Respirations SpO2 Patient Position - Orthostatic VS   06/02/25 1450 06/02/25 1450 06/02/25 1451 06/02/25 1450 06/02/25 1450 06/02/25 1450   97.7 °F (36.5 °C) 70 96/56 18 99 % Sitting      Temp Source Heart Rate Source BP Location FiO2 (%) Pain Score    06/02/25 1450 06/02/25 1450 06/02/25 1450 -- --    Temporal Monitor Left arm        Vitals      Date and Time Temp Pulse SpO2 Resp BP Pain Score FACES Pain Rating User   06/02/25 1730 -- 68 99 % 17 113/59 -- -- CM   06/02/25 1645 -- 63 97 % 14 129/66 -- -- CM   06/02/25 1600 -- 64 97 % 12 123/58 -- -- CM   06/02/25 1549 -- 69 -- 18 109/55 -- -- CM   06/02/25 1547 -- 69 -- 18 116/57 --  -- CM   06/02/25 1546 -- 67 -- 18 105/51 -- -- CM   06/02/25 1451 -- -- -- -- 96/56 -- -- ML   06/02/25 1450 97.7 °F (36.5 °C) 70 99 % 18 -- -- -- ML            Physical Exam  Vitals and nursing note reviewed.   Constitutional:       General: He is not in acute distress.     Appearance: Normal appearance. He is well-developed and well-groomed. He is not ill-appearing or diaphoretic.   HENT:      Head: Normocephalic and atraumatic.      Right Ear: External ear normal.      Left Ear: External ear normal.      Nose: Nose normal.      Mouth/Throat:      Mouth: Mucous membranes are moist.      Pharynx: Oropharynx is clear.     Eyes:      Conjunctiva/sclera: Conjunctivae normal.      Pupils: Pupils are equal, round, and reactive to light.       Cardiovascular:      Rate and Rhythm: Normal rate and regular rhythm.      Heart sounds: Normal heart sounds.   Pulmonary:      Effort: Pulmonary effort is normal.      Breath sounds: Normal breath sounds. No wheezing, rhonchi or rales.   Abdominal:      General: Abdomen is flat.      Tenderness: There is no abdominal tenderness.     Musculoskeletal:         General: Normal range of motion.      Cervical back: Normal range of motion and neck supple.      Right lower leg: No edema.      Left lower leg: No edema.     Skin:     General: Skin is warm and dry.      Coloration: Skin is not jaundiced or pale.      Findings: No rash.     Neurological:      General: No focal deficit present.      Mental Status: He is alert and oriented to person, place, and time.      Cranial Nerves: No cranial nerve deficit.      Motor: No weakness.     Psychiatric:         Mood and Affect: Mood normal.         Behavior: Behavior is cooperative.         Results Reviewed       Procedure Component Value Units Date/Time    HS Troponin I 2hr [[id number]]  (Normal) Collected: 06/02/25 1635    Lab Status: Final result Specimen: Blood from Arm, Right Updated: 06/02/25 1715     hs TnI 2hr 9 ng/L      Delta 2hr  hsTnI 0 ng/L     Urine Microscopic [[id number]]  (Abnormal) Collected: 06/02/25 1635    Lab Status: Final result Specimen: Urine, Clean Catch Updated: 06/02/25 1709     RBC, UA 0-1 /hpf      WBC, UA 0-1 /hpf      Epithelial Cells Occasional /hpf      Bacteria, UA Occasional /hpf      MUCUS THREADS Occasional    UA w Reflex to Microscopic w Reflex to Culture [[id number]]  (Abnormal) Collected: 06/02/25 1635    Lab Status: Final result Specimen: Urine, Clean Catch Updated: 06/02/25 1709     Color, UA Light Yellow     Clarity, UA Clear     Specific Gravity, UA 1.010     pH, UA 7.0     Leukocytes, UA Negative     Nitrite, UA Negative     Protein, UA Negative mg/dl      Glucose, UA Negative mg/dl      Ketones, UA Negative mg/dl      Urobilinogen, UA <2.0 mg/dl      Bilirubin, UA Negative     Occult Blood, UA Trace    TSH, 3rd generation with Free T4 reflex [[id number]]  (Normal) Collected: 06/02/25 1509    Lab Status: Final result Specimen: Blood from Arm, Right Updated: 06/02/25 1549     TSH 3RD GENERATON 1.998 uIU/mL     B-Type Natriuretic Peptide(BNP) [[id number]]  (Normal) Collected: 06/02/25 1509    Lab Status: Final result Specimen: Blood from Arm, Right Updated: 06/02/25 1541     BNP 31 pg/mL     Procalcitonin [[id number]]  (Normal) Collected: 06/02/25 1509    Lab Status: Final result Specimen: Blood from Arm, Right Updated: 06/02/25 1541     Procalcitonin 0.10 ng/ml     HS Troponin 0hr (reflex protocol) [[id number]]  (Normal) Collected: 06/02/25 1509    Lab Status: Final result Specimen: Blood from Arm, Right Updated: 06/02/25 1540     hs TnI 0hr 9 ng/L     Comprehensive metabolic panel [[id number]]  (Abnormal) Collected: 06/02/25 1509    Lab Status: Final result Specimen: Blood from Arm, Right Updated: 06/02/25 1532     Sodium 136 mmol/L      Potassium 4.5 mmol/L      Chloride 99 mmol/L      CO2 26 mmol/L      ANION GAP 11 mmol/L      BUN 44 mg/dL      Creatinine 2.51 mg/dL      Glucose 116 mg/dL      Calcium 9.1 mg/dL       AST 19 U/L      ALT 14 U/L      Alkaline Phosphatase 78 U/L      Total Protein 6.5 g/dL      Albumin 3.9 g/dL      Total Bilirubin 0.71 mg/dL      eGFR 24 ml/min/1.73sq m     Narrative:      [employer] Kid[employer] guidelines for Chronic Kidney Disease (CKD):     Stage 1 with normal or high GFR (GFR > 90 mL/min/1.73 square meters)    Stage 2 Mild CKD (GFR = 60-89 mL/min/1.73 square meters)    Stage 3A Moderate CKD (GFR = 45-59 mL/min/1.73 square meters)    Stage 3B Moderate CKD (GFR = 30-44 mL/min/1.73 square meters)    Stage 4 Severe CKD (GFR = 15-29 mL/min/1.73 square meters)    Stage 5 End Stage CKD (GFR <15 mL/min/1.73 square meters)  Note: GFR calculation is accurate only with a steady state creatinine    Lactic acid, plasma (w/reflex if result > 2.0) [[id number]]  (Normal) Collected: 06/02/25 1509    Lab Status: Final result Specimen: Blood from Arm, Right Updated: 06/02/25 1532     LACTIC ACID 1.5 mmol/L     Narrative:      Result may be elevated if tourniquet was used during collection.    Protime-INR [[id number]]  (Abnormal) Collected: 06/02/25 1509    Lab Status: Final result Specimen: Blood from Arm, Right Updated: 06/02/25 1530     Protime 36.8 seconds      INR 3.72    Narrative:      INR Therapeutic Range    Indication                                             INR Range      Atrial Fibrillation                                               2.0-3.0  Hypercoagulable State                                    2.0.2.3  Left Ventricular Asist Device                            2.0-3.0  Mechanical Heart Valve                                  -    Aortic(with afib, MI, embolism, HF, LA enlargement,    and/or coagulopathy)                                     2.0-3.0 (2.5-3.5)     Mitral                                                             2.5-3.5  Prosthetic/Bioprosthetic Heart Valve               2.0-3.0  Venous thromboembolism (VTE: VT, PE        2.0-3.0    APTT [[id number]]  (Abnormal)  Collected: 06/02/25 1509    Lab Status: Final result Specimen: Blood from Arm, Right Updated: 06/02/25 1530     PTT 37 seconds     CBC and differential [[id number]]  (Abnormal) Collected: 06/02/25 1509    Lab Status: Final result Specimen: Blood from Arm, Right Updated: 06/02/25 1517     WBC 6.02 Thousand/uL      RBC 3.72 Million/uL      Hemoglobin 11.4 g/dL      Hematocrit 35.0 %      MCV 94 fL      MCH 30.6 pg      MCHC 32.6 g/dL      RDW 13.2 %      MPV 9.7 fL      Platelets 211 Thousands/uL      nRBC 0 /100 WBCs      Segmented % 70 %      Immature Grans % 1 %      Lymphocytes % 20 %      Monocytes % 8 %      Eosinophils Relative 1 %      Basophils Relative 0 %      Absolute Neutrophils 4.22 Thousands/µL      Absolute Immature Grans 0.03 Thousand/uL      Absolute Lymphocytes 1.19 Thousands/µL      Absolute Monocytes 0.50 Thousand/µL      Eosinophils Absolute 0.06 Thousand/µL      Basophils Absolute 0.02 Thousands/µL     Blood culture #2 [[id number]] Collected: 06/02/25 1509    Lab Status: In process Specimen: Blood from Arm, Right Updated: 06/02/25 1514    Blood culture #1 [[id number]] Collected: 06/02/25 1509    Lab Status: In process Specimen: Blood from Arm, Right Updated: 06/02/25 1513            XR chest 1 view portable   Final Interpretation by Cade Sanchez MD (06/02 1529)      No acute cardiopulmonary disease.            Workstation performed: [id number]             ECG 12 Lead Documentation Only    Date/Time: 6/2/2025 3:02 PM    Performed by: Gayathri Donohue PA-C  Authorized by: Gayathri Donohue PA-C    Indications / Diagnosis:  Lightheaded  ECG reviewed by me, the ED Provider: yes    Patient location:  ED  Previous ECG:     Previous ECG:  Compared to current    Similarity:  No change  Rate:     ECG rate:  69  Rhythm:     Rhythm: sinus rhythm    Conduction:     Conduction: abnormal      Abnormal conduction: complete RBBB    ST segments:     ST segments:  Normal      ED Medication and  Procedure Management   Prior to Admission Medications   Prescriptions Last Dose Informant Patient Reported? Taking?   Accu-Chek Guide Test test strip   No No   Sig: TEST ONCE A DAY   Patient not taking: Reported on 4/24/2025   Aspirin 81 MG CAPS  Self Yes No   Sig: Take by mouth   DULoxetine (CYMBALTA) 60 mg delayed release capsule  Self No No   Sig: TAKE 1 CAPSULE BY MOUTH EVERY DAY   Zepbound 2.5 MG/0.5ML auto-injector   Yes No   Sig: Inject 5 mg under the skin once a week   atorvastatin (LIPITOR) 80 mg tablet  Self No No   Sig: TAKE 1 TABLET BY MOUTH EVERY EVENING   ezetimibe (ZETIA) 10 mg tablet  Self No No   Sig: TAKE 1 TABLET BY MOUTH EVERY DAY   gabapentin (NEURONTIN) 300 mg capsule   No No   Sig: Take 1 capsule (300 mg total) by mouth 2 (two) times a day   lisinopril (ZESTRIL) 5 mg tablet  Self No No   Sig: Take 1 tablet (5 mg total) by mouth daily   metoprolol succinate (TOPROL-XL) 25 mg 24 hr tablet  Self No No   Sig: Take 1 tablet (25 mg total) by mouth 2 (two) times a day   mometasone (ELOCON) 0.1 % cream  Self No No   Sig: APPLY TO AFFECTED AREA TOPICALLY EVERY DAY   nitroglycerin (NITROSTAT) 0.4 mg SL tablet  Self No No   Sig: Place 1 tablet (0.4 mg total) under the tongue every 5 (five) minutes as needed for chest pain   potassium chloride (Klor-Con M20) 20 mEq tablet  Self No No   Sig: Take 1 tablet (20 mEq total) by mouth daily   tamsulosin (FLOMAX) 0.4 mg   No No   Sig: Take 2 capsules (0.8 mg total) by mouth daily with dinner   torsemide (DEMADEX) 20 mg tablet   No No   Sig: Take 1.5 tablets (30 mg total) by mouth daily   warfarin (COUMADIN) 5 mg tablet   No No   Sig: TAKE ONE-HALF TABLET BY MOUTH DAILY , EXCEPT TAKE 1 TABLET BY MOUTH ON WEDNESDAY AND SATURDAY   zolpidem (AMBIEN) 10 mg tablet   No No   Sig: Take 1 tablet (10 mg total) by mouth daily at bedtime as needed for sleep      Facility-Administered Medications: None     Discharge Medication List as of 6/2/2025  5:29 PM        START taking  these medications    Details   zolpidem (AMBIEN) 10 mg tablet Take 1 tablet (10 mg total) by mouth daily at bedtime as needed for sleep, Starting Mon 6/2/2025, Normal           CONTINUE these medications which have NOT CHANGED    Details   gabapentin (NEURONTIN) 300 mg capsule Take 1 capsule (300 mg total) by mouth 2 (two) times a day, Starting Thu 5/15/2025, Normal      Accu-Chek Guide Test test strip TEST ONCE A DAY, Starting Thu 4/3/2025, Normal      Aspirin 81 MG CAPS Take by mouth, Historical Med      atorvastatin (LIPITOR) 80 mg tablet TAKE 1 TABLET BY MOUTH EVERY EVENING, Starting Thu 1/9/2025, Normal      DULoxetine (CYMBALTA) 60 mg delayed release capsule TAKE 1 CAPSULE BY MOUTH EVERY DAY, Starting Thu 1/9/2025, Normal      ezetimibe (ZETIA) 10 mg tablet TAKE 1 TABLET BY MOUTH EVERY DAY, Starting Mon 8/26/2024, Normal      lisinopril (ZESTRIL) 5 mg tablet Take 1 tablet (5 mg total) by mouth daily, Starting Fri 8/16/2024, Normal      metoprolol succinate (TOPROL-XL) 25 mg 24 hr tablet Take 1 tablet (25 mg total) by mouth 2 (two) times a day, Starting Tue 11/19/2024, Normal      mometasone (ELOCON) 0.1 % cream APPLY TO AFFECTED AREA TOPICALLY EVERY DAY, Normal      nitroglycerin (NITROSTAT) 0.4 mg SL tablet Place 1 tablet (0.4 mg total) under the tongue every 5 (five) minutes as needed for chest pain, Starting Thu 5/18/2023, Normal      potassium chloride (Klor-Con M20) 20 mEq tablet Take 1 tablet (20 mEq total) by mouth daily, Starting Fri 8/16/2024, Normal      tamsulosin (FLOMAX) 0.4 mg Take 2 capsules (0.8 mg total) by mouth daily with dinner, Starting Tue 5/13/2025, Normal      torsemide (DEMADEX) 20 mg tablet Take 1.5 tablets (30 mg total) by mouth daily, Starting Tue 4/29/2025, Normal      warfarin (COUMADIN) 5 mg tablet TAKE ONE-HALF TABLET BY MOUTH DAILY , EXCEPT TAKE 1 TABLET BY MOUTH ON WEDNESDAY AND SATURDAY, Normal      Zepbound 2.5 MG/0.5ML auto-injector Inject 5 mg under the skin once a week,  Starting Wed 4/23/2025, Historical Med           No discharge procedures on file.  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, 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, on steroids for prolonged period    Ambulatory dysfunction     cane use    Anemia     Arthritis     Bleeding disorder ([facility])     BPH (benign prostatic hyperplasia)     CHF (congestive heart failure) ([facility])     Chronic kidney disease     stage 3b, baseline Cr 2.2    DDD (degenerative disc disease), cervical     DDD (degenerative disc disease), lumbar     Deep vein thrombosis ([facility])     Diverticulosis     Factor V Leiden ([facility])     Headache(784.0) 3/2022    Never till cervical  spine surgery    History of DVT (deep vein thrombosis)     from Facotr V, on Coumadin    Citizen [place] (hard of hearing)     Hyperlipidemia     Hypertension     Insomnia     Ischemic cardiomyopathy     EF 33%    MRSA carrier     Neuropathy     Obesity     WILL on CPAP     Osteoarthritis     Other acute osteomyelitis, other site ([facility]) 01/03/2023    Paroxysmal atrial fibrillation ([facility])     s/p ablation, s/p DCCV, Coumadin, on Amiodarone for    RBBB     Rotator cuff tear     right    Severe aortic stenosis     Steroid-induced hyperglycemia     requiring insulin, w/ MRSA infection 2022, resolved   [2]   Past Surgical History:  Procedure Laterality Date    BACK SURGERY      CARDIAC CATHETERIZATION N/A 04/09/2023    Procedure: Cardiac pci;   Surgeon: Bird Cast MD;  [facility]: [facility];  Service: Cardiology    CARDIAC CATHETERIZATION N/A 04/09/2023    Procedure: Cardiac Coronary Angiogram;  Surgeon: Bird Cast MD;  [facility]: [facility];  Service: Cardiology    CARDIAC ELECTROPHYSIOLOGY PROCEDURE N/A 01/02/2025    Procedure: Cardiac eps/afib ablation PFA;  Surgeon: Derik Amin MD;  Location: [facility];  Service: Cardiology    CATARACT EXTRACTION Right     COLON SURGERY      COLONOSCOPY      INCISION AND DRAINAGE POSTERIOR SPINE N/A 04/01/2022    Procedure: Posterior cervical evacuation of postoperative collection and debridement with placement of drains C3-T1;  Surgeon: Rajeev Garcia MD;  Location: [facility];  Service: Neurosurgery    IR BIOPSY KIDNEY RANDOM  05/26/2022    IR TUNNELED DIALYSIS CATHETER PLACEMENT  05/23/2022    IR TUNNELED DIALYSIS CATHETER REMOVAL  06/02/2022    LUMBAR SPINE SURGERY      herniated disc    DC ARTHRD ANT INTERBODY MIN DSC CRV BELOW C2 N/A 03/11/2022    Procedure: Anterior cervical discectomy and fixation fusion C5/6 and C6/7; Posterior cervical decompression and instrumented fusion C3-T1;  Surgeon: Rajeev Garcia MD;  Location: [facility];  Service: Neurosurgery    DC ARTHRP KNE CONDYLE&PLATU MEDIAL&LAT COMPARTMENTS Right 04/08/2024    Procedure: ARTHROPLASTY KNEE TOTAL and all associated procedures;  Surgeon: Dot Galindo MD;  Location[facility];  Service: Orthopedics    DC ARTHRP KNE CONDYLE&PLATU MEDIAL&LAT COMPARTMENTS Left 09/23/2024    Procedure: ARTHROPLASTY KNEE TOTAL;  Surgeon: Dot Galindo MD;  [facility];  Service: Orthopedics    RENAL BIOPSY  6/2022    SPINE SURGERY  03/11/2022    Cervical myelopathy/ cervical fusion   [3]   Family History  Problem Relation Name Age of Onset    Lung cancer Mother      Hearing loss Father Father     Emphysema Sister      Heart attack Brother      Atrial fibrillation Brother      Heart  failure Brother      Other (atrial fib) Brother      Clotting disorder Son     [4]   Social History  Tobacco Use    Smoking status: Never    Smokeless tobacco: Never   Vaping Use    Vaping status: Never Used   Substance Use Topics    Alcohol use: Not Currently    Drug use: No        Gayathri Donohue PA-C  06/02/25 2897

## 2025-06-03 DIAGNOSIS — I25.10 CORONARY ARTERY DISEASE INVOLVING NATIVE CORONARY ARTERY OF NATIVE HEART WITHOUT ANGINA PECTORIS: Chronic | ICD-10-CM

## 2025-06-03 RX ORDER — ATORVASTATIN CALCIUM 80 MG/1
80 TABLET, FILM COATED ORAL EVERY EVENING
Qty: 90 TABLET | Refills: 1 | Status: SHIPPED | OUTPATIENT
Start: 2025-06-03

## 2025-06-04 LAB
ATRIAL RATE: 69 BPM
P AXIS: 47 DEGREES
PR INTERVAL: 228 MS
QRS AXIS: 105 DEGREES
QRSD INTERVAL: 148 MS
QT INTERVAL: 428 MS
QTC INTERVAL: 458 MS
T WAVE AXIS: 63 DEGREES
VENTRICULAR RATE: 69 BPM

## 2025-06-04 PROCEDURE — 93010 ELECTROCARDIOGRAM REPORT: CPT | Performed by: INTERNAL MEDICINE

## 2025-06-05 ENCOUNTER — OFFICE VISIT (OUTPATIENT)
Dept: PHYSICAL THERAPY | Facility: CLINIC | Age: 75
End: 2025-06-05
Attending: INTERNAL MEDICINE
Payer: COMMERCIAL

## 2025-06-05 DIAGNOSIS — M25.552 LEFT HIP PAIN: Primary | ICD-10-CM

## 2025-06-05 PROCEDURE — 97110 THERAPEUTIC EXERCISES: CPT | Performed by: PHYSICAL THERAPIST

## 2025-06-05 PROCEDURE — 97162 PT EVAL MOD COMPLEX 30 MIN: CPT | Performed by: PHYSICAL THERAPIST

## 2025-06-05 NOTE — PROGRESS NOTES
PT Evaluation     Today's date: 2025  Patient name: Tian Garcia  : 1950  MRN: [id number]  Referring provider: Ronnie Thorpe PT  Dx:   Encounter Diagnosis     ICD-10-CM    1. Left hip pain  M25.552                      Assessment  Impairments: abnormal gait, abnormal or restricted ROM, activity intolerance, impaired physical strength, lacks appropriate home exercise program and pain with function  Symptom irritability: moderate    Assessment details: Tian Garcia is a 75 y.o. male presenting to outpatient physical therapy with chief complaints of (L) thigh pain and weakness. Patient describes onset of thigh pain after TKA. Patient displays with abnormal gait, restricted lumbar AROM, significant (B) Hip strength deficits, (+) SLR, balance deficits and functional restrictions.  Patient's symptoms are multifactorial in nature with a primary movement diagnosis of motor control resulting in pathoanatomical signs and symptoms consistent with Left hip pain  (primary encounter diagnosis) resulting in limitations in his ability to perform functional mobility tasks without pain.  No further referral appears necessary at this time based upon examination results - seeing pain management this week to review MRI results.    Please contact me if you have any questions. Thank you for the opportunity to share in the care of this patient.        Understanding of Dx/Px/POC: good     Prognosis: good  Prognosis details: Positive prognostic indicators include positive attitude toward recovery, motivated to improve, good understanding of condition, and realistic expectations.  Negative prognostic indicators include chronicity of symptoms.       Goals  STG to be met in 4 weeks:  - Increase Lumbar AROM: minimal restriction all planes.  - Increase (B) LE strength by 1/2 MMT grade.  - I with HEP.  - Patient will be able to return to lifting and carrying groceries with minimal difficulty.   LTG to be met in 8 weeks:  -  Increase (B) LE strength to 4+/5 all planes.  - I with updated HEP.  - Patient will be able to walk over 1 mile without increased pain.       Plan  Patient would benefit from: skilled physical therapy  Planned modality interventions: cryotherapy and thermotherapy: hydrocollator packs    Planned therapy interventions: activity modification, joint mobilization, manual therapy, neuromuscular re-education, patient education, postural training, strengthening, stretching, therapeutic exercise, therapeutic activities, functional ROM exercises, home exercise program, gait training and body mechanics training    Plan of Care beginning date: 2025  Plan of Care expiration date: 2025  Treatment plan discussed with: patient  Plan details: Prognosis above is given PT services 2x/week tapering to 1x/week over the next 8 weeks and home program adherence.           Subjective Evaluation    History of Present Illness  Mechanism of injury: At Evaluation (2025): Patient reports onset of (L) thigh pain last November - performing sit to stand exercise after his (L) TKA.  He reports his symptoms have fluctuated - particularly with transfers.  He notes his (L) hip has been painful as well - since falling in April.  He requested an evaluation with me to work on (L) leg strength.       Red Flag Screen: Patient denies: fever, changes in bowel or bladder function, headache, dizziness, visual changes, nausea, vomiting, weakness, unexplained weight loss, numbness, tingling , pain with coughing/ sneezing, or traumatic ALYSON.     Greatest concern: difficulty walking and standing  Quality of life: good    Patient Goals  Patient goal: Patient Specific Functional Scale: lift and carry groceries without increased pain 0/10, return to walking over 1 mile 0/10, return to fishing without pain getting to the location 0/10; Total 0/30  Pain  Current pain ratin  At best pain ratin  At worst pain ratin  Location: (L) hip and  thigh  Quality: dull ache, sharp and radiating  Alleviating factors: Activity modification, sitting.  Exacerbated by: Standing, Walking, Stair negotiation, Reaching, Sit to stand, Lifting/ carrying.    Social Support  Steps to enter house: yes  Stairs in house: yes   Lives in: multiple-level home  Lives with: spouse    Employment status: not working    Diagnostic Tests  MRI studies: abnormal  Treatments  Previous treatment: medication and physical therapy        Objective     Static Posture   General Observations  Asymmetrical weight bearing and shifted right.     Lumbar Spine   Flattened.     Postural Observations  Seated posture: fair  Standing posture: fair      Palpation     Additional Palpation Details  Minimal TTP throughout lumbar spine - TTP (L) lateral thigh     Active Range of Motion     Lumbar   Flexion:  with pain Restriction level: moderate  Extension:  with pain Restriction level: moderate    Additional Active Range of Motion Details  (R) SG: moderate restriction - increased pain  (L) SG: minimal restriction - no pain    Strength/Myotome Testing     Left Hip   Planes of Motion   Flexion: 4  Abduction: 3-    Right Hip   Planes of Motion   Flexion: 4  Abduction: 3-    Left Knee   Flexion: 5  Extension: 4    Right Knee   Flexion: 5  Extension: 5    Left Ankle/Foot   Dorsiflexion: 5  Plantar flexion: 4+  Great toe extension: 4+    Right Ankle/Foot   Dorsiflexion: 4-  Plantar flexion: 4+  Great toe extension: 4    Tests     Lumbar   Negative SIJ compression and sacroiliac distraction.     Left   Positive passive SLR.   Negative crossed SLR and slump test.     Right   Positive passive SLR.   Negative crossed SLR and slump test.     Left Hip   Negative DERRICK and FADIR.     Right Hip   Negative DERRICK and FADIR.     Ambulation     Observational Gait   Gait: antalgic   Decreased walking speed, left stance time and left step length.             Daily Treatment Diary     DX: (L) Hip Pain  Follow Up with Referring  Provider:   Precautions: fall risk   CO-MORBIDITIES: Hx of neck fusion, (B) TKA, A-fib, CHF       POC Expires Reeval for Medicare to be completed Unit Limit Auth Expiration Date PT/OT/STVisit Limit   7/31/25 By visit NA  NA 12/31/25 BOMN    Completed on visit                  Stage: chronic   Etiology: deconditioning  Stability of Symptoms:  At Evaluation: Stable - unchanged  Global Rating of Change:   Symptom Irritability Level: Moderate  Primary Movement System Diagnosis: Motor Control  Primary Pain Phenotype: Nociceptive  Patient Specific Functional Scale:   6/5/25: lift and carry groceries without increased pain 0/10, return to walking over 1 mile 0/10, return to fishing without pain getting to the location 0/10; Total 0/30   Patient Acceptable Symptom State: unacceptable  FOTO Prediction: 50 by visit 12  FOTO Progress: 38 at evaluation   Greatest Concern: difficulty walking and standing   Current Activity Plan: added to HEP (6/5)  Current Educational Needs: appropriate activity modifications, appropriate activity progressions, realistic expectations, timeframe for recovery, next steps to take to progress towards goals      Auth Status DATE 6/5        NA Visit # 1         Remaining         MANUAL THERAPY         Lumbar Mobs                                                       THERAPEUTIC EXERCISE HEP         Recumbent Bike                    Standing Hip Flex 6/5         Standing Hip ABD 6/5         Standing Hip Ext 6/5 Supine March          TB H/L Hip ABD          S/L Clamshell           Bridge                    Knee Ext Machine          Leg Press                              NEUROMUSCULAR REEDUCATION           Side stepping with LE cone taps                    FWD Mini Lunge          LAT Mini Lunge          Squat                              FWD Step Up          LAT Step Up                                                            THERAPEUTIC ACTIVITY                                                   GAIT TRAINING                                                  MODALITIES

## 2025-06-05 NOTE — HOME EXERCISE EDUCATION
Program_ID:[id number]   Access Code: [email or web address]  URL: [email or web address]  Date: 06-  Prepared By: Ronnie Thorpe    Program Notes      Exercises      - Standing March with Counter Support - 1 x daily -  x weekly - 2 sets - 10 reps      - Standing Hip Abduction with Counter Support - 1 x daily -  x weekly - 2 sets - 10 reps      - Standing Hip Extension with Counter Support - 1 x daily -  x weekly - 2 sets - 10 reps    Patient Education      - Clinic Information       - Your Next Appointment      - Understanding Pain

## 2025-06-06 ENCOUNTER — OFFICE VISIT (OUTPATIENT)
Dept: FAMILY MEDICINE CLINIC | Facility: HOSPITAL | Age: 75
End: 2025-06-06
Payer: COMMERCIAL

## 2025-06-06 VITALS
WEIGHT: 237 LBS | OXYGEN SATURATION: 96 % | DIASTOLIC BLOOD PRESSURE: 60 MMHG | HEIGHT: 71 IN | HEART RATE: 73 BPM | BODY MASS INDEX: 33.18 KG/M2 | SYSTOLIC BLOOD PRESSURE: 100 MMHG

## 2025-06-06 DIAGNOSIS — I25.5 ISCHEMIC CARDIOMYOPATHY: ICD-10-CM

## 2025-06-06 DIAGNOSIS — M17.12 PRIMARY OSTEOARTHRITIS OF LEFT KNEE: ICD-10-CM

## 2025-06-06 DIAGNOSIS — I25.10 CORONARY ARTERY DISEASE INVOLVING NATIVE CORONARY ARTERY OF NATIVE HEART WITHOUT ANGINA PECTORIS: Chronic | ICD-10-CM

## 2025-06-06 DIAGNOSIS — N02.B9 IGA NEPHROPATHY DETERMINED BY BIOPSY OF KIDNEY: ICD-10-CM

## 2025-06-06 DIAGNOSIS — R55 NEAR SYNCOPE: Primary | ICD-10-CM

## 2025-06-06 DIAGNOSIS — M76.32 ILIOTIBIAL BAND SYNDROME OF LEFT SIDE: ICD-10-CM

## 2025-06-06 DIAGNOSIS — I48.0 PAROXYSMAL A-FIB (HCC): ICD-10-CM

## 2025-06-06 PROCEDURE — 99214 OFFICE O/P EST MOD 30 MIN: CPT | Performed by: INTERNAL MEDICINE

## 2025-06-06 RX ORDER — METOPROLOL SUCCINATE 25 MG/1
25 TABLET, EXTENDED RELEASE ORAL DAILY
Qty: 180 TABLET | Refills: 3 | Status: SHIPPED | OUTPATIENT
Start: 2025-06-06

## 2025-06-06 RX ORDER — LISINOPRIL 2.5 MG/1
2.5 TABLET ORAL DAILY
Qty: 90 TABLET | Refills: 1 | Status: SHIPPED | OUTPATIENT
Start: 2025-06-06

## 2025-06-06 NOTE — ASSESSMENT & PLAN NOTE
On Friday May 30 th was at restaurant and felt light headed and eye brows were up and eyes were closed and a minute later was better after having a deep yawn Ate lunch and then felt better but had mild light headedness in bright sunshine.   Then on 6/2/25  .Had near syncope at [facility] building  about 1 pm - did not eat breakfast - ambulance was called  as he was light headed and ws sat in chair  - bp was 73/39 by medics

## 2025-06-06 NOTE — PROGRESS NOTES
Assessment/Plan:     Diagnosis ICD-10-CM Associated Orders   1. Near syncope  R55 Comprehensive metabolic panel     CBC and differential     Protime-INR     Comprehensive metabolic panel     CBC and differential     Protime-INR      2. Primary osteoarthritis of left knee  M17.12       3. Ischemic cardiomyopathy  I25.5 lisinopril (ZESTRIL) 2.5 mg tablet     metoprolol succinate (TOPROL-XL) 25 mg 24 hr tablet      4. Paroxysmal A-fib (HCC)  I48.0 metoprolol succinate (TOPROL-XL) 25 mg 24 hr tablet     Protime-INR     Protime-INR      5. Coronary artery disease involving native coronary artery of native heart without angina pectoris  I25.10 metoprolol succinate (TOPROL-XL) 25 mg 24 hr tablet      6. 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  N02.B9 Comprehensive metabolic panel     Comprehensive metabolic panel      7. Iliotibial band syndrome of left side  M76.32           Problem List Items Addressed This Visit        Cardiovascular and Mediastinum    Paroxysmal A-fib/flutter (HCC)    Relevant Medications    metoprolol succinate (TOPROL-XL) 25 mg 24 hr tablet    Other Relevant Orders    Protime-INR    Ischemic cardiomyopathy    Relevant Medications    lisinopril (ZESTRIL) 2.5 mg tablet    metoprolol succinate (TOPROL-XL) 25 mg 24 hr tablet    Near syncope - Primary    On Friday May 30 th was at restaurant and felt light headed and eye brows were up and eyes were closed and a minute later was better after having a deep yawn Ate lunch and then felt better but had mild light headedness in bright sunshine.   Then on 6/2/25  .Had near syncope at [facility]  about 1 pm - did not eat breakfast - ambulance was called  as he was light headed and ws sat in chair  - bp was 73/39 by medics         Relevant Orders    Comprehensive metabolic panel    CBC and differential    Protime-INR       Musculoskeletal and Integument    Primary osteoarthritis of left knee    In nov was doing pt and had popping sensation on lateral  "left leg- had it happen again at therapy- repeat Mri was done showed some effusion but otherwise was ok   Seeing team at  [facility] site now- has some ileotibial band region  region.   Now sleeping on recliner chair as he back and lumbar pain            Genitourinary    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    Relevant Orders    Comprehensive metabolic panel   Other Visit Diagnoses       Coronary artery disease involving native coronary artery of native heart without angina pectoris  (Chronic)       Relevant Medications    lisinopril (ZESTRIL) 2.5 mg tablet    metoprolol succinate (TOPROL-XL) 25 mg 24 hr tablet      Iliotibial band syndrome of left side                Return in about 4 weeks (around 7/4/2025).      Subjective:    Patient ID: Tian Garcia is a 75 y.o. male    Here for special appt- near syncope issues-   Will decrease bp meds as still relatively now   Cut the  amlodipine to 2.5 mg and the  metoprolol 25 xl once daily- will repeat labs in 1 week- had elevated crt at ed and given IV fluids        The following portions of the patient's history were reviewed and updated as appropriate: allergies, current medications and problem list.     Review of Systems   HENT:  Negative for congestion.    Cardiovascular:  Negative for palpitations.   Gastrointestinal:  Negative for abdominal pain.   Musculoskeletal:  Positive for back pain and gait problem.   Skin:  Negative for rash and wound.   Hematological:  Negative for adenopathy.   Psychiatric/Behavioral:  Behavioral problem: usng platform cane.    All other systems reviewed and are negative.        Objective:    Current Medications[1]    Blood pressure 100/60, pulse 73, height 5' 11\" (1.803 m), weight 108 kg (237 lb), SpO2 96%.     Physical Exam  Vitals and nursing note reviewed.   Constitutional:       General: He is not in acute distress.  HENT:      Head: Normocephalic.      Right Ear: Tympanic membrane normal. There is no impacted " cerumen.      Left Ear: Tympanic membrane normal. There is no impacted cerumen.      Nose: Congestion present.     Eyes:      General:         Right eye: No discharge.         Left eye: No discharge.       Cardiovascular:      Rate and Rhythm: Normal rate and regular rhythm.      Heart sounds: No murmur heard.  Pulmonary:      Breath sounds: No wheezing.   Abdominal:      Tenderness: There is no abdominal tenderness.     Musculoskeletal:         General: Tenderness present.      Comments: Left it band tenderness with trigger points   Trace ankle edema- chronic knee swelling   Lymphadenopathy:      Cervical: No cervical adenopathy.     Skin:     Findings: No erythema or rash.     Neurological:      Mental Status: He is alert and oriented to person, place, and time.      Motor: No weakness.     Psychiatric:         Mood and Affect: Mood normal.         Thought Content: Thought content normal.         Judgment: Judgment normal.               [1]    Current Outpatient Medications:   •  Aspirin 81 MG CAPS, Take by mouth, Disp: , Rfl:   •  atorvastatin (LIPITOR) 80 mg tablet, TAKE 1 TABLET BY MOUTH EVERY EVENING, Disp: 90 tablet, Rfl: 1  •  DULoxetine (CYMBALTA) 60 mg delayed release capsule, TAKE 1 CAPSULE BY MOUTH EVERY DAY, Disp: 90 capsule, Rfl: 1  •  ezetimibe (ZETIA) 10 mg tablet, TAKE 1 TABLET BY MOUTH EVERY DAY, Disp: 90 tablet, Rfl: 3  •  gabapentin (NEURONTIN) 300 mg capsule, Take 1 capsule (300 mg total) by mouth 2 (two) times a day, Disp: 180 capsule, Rfl: 1  •  lisinopril (ZESTRIL) 2.5 mg tablet, Take 1 tablet (2.5 mg total) by mouth daily, Disp: 90 tablet, Rfl: 1  •  metoprolol succinate (TOPROL-XL) 25 mg 24 hr tablet, Take 1 tablet (25 mg total) by mouth daily, Disp: 180 tablet, Rfl: 3  •  mometasone (ELOCON) 0.1 % cream, APPLY TO AFFECTED AREA TOPICALLY EVERY DAY, Disp: 45 g, Rfl: 1  •  potassium chloride (Klor-Con M20) 20 mEq tablet, Take 1 tablet (20 mEq total) by mouth daily, Disp: 90 tablet, Rfl: 3  •   tamsulosin (FLOMAX) 0.4 mg, Take 2 capsules (0.8 mg total) by mouth daily with dinner, Disp: 90 capsule, Rfl: 1  •  torsemide (DEMADEX) 20 mg tablet, Take 1.5 tablets (30 mg total) by mouth daily, Disp: 45 tablet, Rfl: 0  •  warfarin (COUMADIN) 5 mg tablet, TAKE ONE-HALF TABLET BY MOUTH DAILY , EXCEPT TAKE 1 TABLET BY MOUTH ON WEDNESDAY AND SATURDAY, Disp: 58 tablet, Rfl: 1  •  Zepbound 2.5 MG/0.5ML auto-injector, Inject 5 mg under the skin once a week, Disp: , Rfl:   •  zolpidem (AMBIEN) 10 mg tablet, Take 1 tablet (10 mg total) by mouth daily at bedtime as needed for sleep, Disp: 90 tablet, Rfl: 0  •  Accu-Chek Guide Test test strip, TEST ONCE A DAY (Patient not taking: Reported on 6/6/2025), Disp: 100 strip, Rfl: 1  •  nitroglycerin (NITROSTAT) 0.4 mg SL tablet, Place 1 tablet (0.4 mg total) under the tongue every 5 (five) minutes as needed for chest pain (Patient not taking: Reported on 6/6/2025), Disp: 30 tablet, Rfl: 0

## 2025-06-06 NOTE — ASSESSMENT & PLAN NOTE
In nov was doing pt and had popping sensation on lateral left leg- had it happen again at therapy- repeat Mri was done showed some effusion but otherwise was ok   Seeing team at  [facility] site now- has some ileotibial band region  region.   Now sleeping on recliner chair as he back and lumbar pain

## 2025-06-07 LAB
BACTERIA BLD CULT: NORMAL
BACTERIA BLD CULT: NORMAL

## 2025-06-09 ENCOUNTER — OFFICE VISIT (OUTPATIENT)
Dept: PAIN MEDICINE | Facility: CLINIC | Age: 75
End: 2025-06-09
Payer: COMMERCIAL

## 2025-06-09 VITALS
HEART RATE: 76 BPM | WEIGHT: 235 LBS | TEMPERATURE: 97.4 F | OXYGEN SATURATION: 96 % | HEIGHT: 71 IN | BODY MASS INDEX: 32.9 KG/M2

## 2025-06-09 DIAGNOSIS — M48.061 SPINAL STENOSIS OF LUMBAR REGION WITHOUT NEUROGENIC CLAUDICATION: ICD-10-CM

## 2025-06-09 DIAGNOSIS — M54.16 LUMBAR RADICULOPATHY: ICD-10-CM

## 2025-06-09 DIAGNOSIS — M25.552 LEFT HIP PAIN: Primary | ICD-10-CM

## 2025-06-09 DIAGNOSIS — M46.1 SACROILIITIS (HCC): ICD-10-CM

## 2025-06-09 PROCEDURE — 99214 OFFICE O/P EST MOD 30 MIN: CPT | Performed by: PHYSICIAN ASSISTANT

## 2025-06-09 NOTE — PROGRESS NOTES
Name: Tian Garcia      : 1950      MRN: [id number]  Encounter Provider: Jessi Small PA-C  Encounter Date: 2025   [facility]  :  Assessment & Plan  Left hip pain  While the patient was in the office today, I did have a thorough conversation regarding their chronic pain syndrome, medication management, and treatment plan options.    After discussing options, I have recommended the patient proceed with a left hip x-ray for further evaluation of the pain in this region.    Will recommend continuation of physical therapy    Orders:  •  XR hip/pelv 2-3 vws left if performed; Future    Spinal stenosis of lumbar region without neurogenic claudication  Lumbar spine MRI reviewed with the patient, explaining that he does have severe stenosis which is likely a major contributing factor to his left thigh pain and in particular L3-4 level.  Discussed the role of an epidural steroid injection however the patient is not interested at this time.  Patient states that he has had multiple injections in various places without any significant relief and he is doubtful that it would help.  Patient is also not interested in a neurosurgical consultation.    Follow-up is planned after the x-ray or sooner as warranted. The patient was advised to contact the office should their symptoms worsen in the interim.         Lumbar radiculopathy         Sacroiliitis (HCC)  Consider left SI joint.       My impressions and treatment recommendations were discussed in detail with the patient who verbalized understanding and had no further questions.  Discharge instructions were provided. I personally saw and examined the patient and I agree with the above discussed plan of care.    History of Present Illness     Tian Garcia is a 75 y.o. male who presents for a follow up office visit in regards to Back Pain and Leg Pain. The patient’s current symptoms include left hip pain and  "left thigh pain that he rates a 5 out of 10 today.  Patient describes the pain as an intermittent dull, aching, sharp, pressure-like and shooting pain that radiates into the left lateral hip and anterior left thigh to the knee.  His pain does interfere with his daily living activities.  He has been continue with physical therapy without much relief.  Patient states that this pain began shortly after his left knee surgery.  He is questioning if there is a hip etiology however he has not had any hip studies.  Patient underwent the lumbar spine MRI and presents today to review this and discuss potential options for treatment.    Review of Systems   Respiratory:  Negative for shortness of breath.    Cardiovascular:  Negative for chest pain.   Gastrointestinal:  Negative for constipation, diarrhea, nausea and vomiting.   Musculoskeletal:  Positive for arthralgias, gait problem and joint swelling. Negative for myalgias.   Skin:  Negative for rash.   Neurological:  Positive for weakness. Negative for dizziness and seizures.   Psychiatric/Behavioral:  Negative for dysphoric mood.    All other systems reviewed and are negative.      Medical History Reviewed by provider this encounter:  Tobacco  Allergies  Meds  Problems  Med Hx  Surg Hx  Fam Hx     .    Objective   Pulse 76   Temp (!) 97.4 °F (36.3 °C)   Ht 5' 11\" (1.803 m)   Wt 107 kg (235 lb)   SpO2 96%   BMI 32.78 kg/m²         Physical Exam  Constitutional: normal, well developed, well nourished, alert, in no distress and non-toxic and no overt pain behavior. and overweight  Eyes: anicteric  HEENT: grossly intact  Neck: supple, symmetric, trachea midline and no masses   Pulmonary: even and unlabored  Cardiovascular: No edema or pitting edema present  Skin: Normal without rashes or lesions and well hydrated  Psychiatric: Mood and affect appropriate  Neurologic: Cranial Nerves II-XII grossly intact  Musculoskeletal: Gait is antalgic    MRI LUMBAR SPINE WITHOUT " CONTRAST 6/2/2025 @ [facility]     INDICATION: M54.16: Radiculopathy, lumbar region.   left lumbar radic, hx spine surgery-kidney disease     COMPARISON: Lumbar spine radiograph 4/17/2025, 1/25/2025. CTA dissection protocol chest abdomen pelvis 4/9/2023.     TECHNIQUE:  Multiplanar, multisequence imaging of the lumbar spine was performed. .        IMAGE QUALITY:  Diagnostic     FINDINGS:     VERTEBRAL BODIES:  There are 5 lumbar type vertebral bodies. Normal alignment of straightened lumbar spine.  No spondylolysis or spondylolisthesis. No scoliosis.  No compression fracture.     Type I Modic endplate change L1-L2 and L4-L5. Type II Modic endplate change L2-L3 and L5-S1. Otherwise, normal bone marrow signal.     SACRUM:  Normal signal within the sacrum. No evidence of insufficiency or stress fracture. Partially imaged degenerative changes of bilateral sacroiliac joints with right anterior bridging osteophytes.     DISTAL CORD AND CONUS:  Normal size and signal within the distal cord and conus.     PARASPINAL SOFT TISSUES:  Paraspinal soft tissues are unremarkable.     LOWER THORACIC DISC SPACES:  Mild noncompressive lower thoracic degenerative change.     LUMBAR DISC SPACES:  Multilevel osteophytes, disc height loss, facet arthropathy, and enlarged abutting spinous processes. Congenitally short pedicles contributes to diffuse canal stenosis.     L1-L2:  Diffuse disc bulge, narrowed bilateral subarticular zones. Facet arthropathy, ligamentum flavum thickening. Moderate canal stenosis. Mild bilateral foraminal narrowing.     L2-L3:  Diffuse disc bulge, narrowed bilateral subarticular zones, posterior marginal osteophytes.  Moderate canal stenosis. Mild right and mild-to-moderate left foraminal narrowing.     L3-L4:  Diffuse disc bulge, narrowed bilateral subarticular zones. Hypertrophic facet arthropathy, ligamentum flavum thickening. Severe canal stenosis. Mild bilateral foraminal narrowing.     L4-L5: Disc bulge,  narrowed bilateral subarticular zones, posterior marginal osteophytes. Facet arthropathy, ligamentum flavum thickening. Mild canal stenosis. Moderate right and mild-to-moderate left foraminal narrowing.     L5-S1: Disc bulge, small calcified central disc protrusion, narrowed bilateral subarticular zones, posterior marginal osteophytes. Mild canal stenosis. Moderate bilateral foraminal narrowing.     OTHER FINDINGS: Partially imaged colonic diverticulosis.     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

## 2025-06-10 ENCOUNTER — OFFICE VISIT (OUTPATIENT)
Dept: PHYSICAL THERAPY | Facility: CLINIC | Age: 75
End: 2025-06-10
Attending: INTERNAL MEDICINE
Payer: COMMERCIAL

## 2025-06-10 ENCOUNTER — HOSPITAL ENCOUNTER (OUTPATIENT)
Dept: RADIOLOGY | Facility: HOSPITAL | Age: 75
Discharge: HOME | End: 2025-06-10
Payer: COMMERCIAL

## 2025-06-10 DIAGNOSIS — M25.552 LEFT HIP PAIN: Primary | ICD-10-CM

## 2025-06-10 DIAGNOSIS — M25.552 LEFT HIP PAIN: ICD-10-CM

## 2025-06-10 PROCEDURE — 73502 X-RAY EXAM HIP UNI 2-3 VIEWS: CPT

## 2025-06-10 PROCEDURE — 97110 THERAPEUTIC EXERCISES: CPT | Performed by: PHYSICAL THERAPIST

## 2025-06-10 PROCEDURE — 97112 NEUROMUSCULAR REEDUCATION: CPT | Performed by: PHYSICAL THERAPIST

## 2025-06-10 NOTE — PROGRESS NOTES
Daily Note     Today's date: 6/10/2025  Patient name: Tian Garcia  : 1950  MRN: [id number]  Referring provider: Ronnie Thorpe, PT  Dx:   Encounter Diagnosis     ICD-10-CM    1. Left hip pain  M25.552                      Subjective:   Current Status: (L) leg is feeling a little better today - symptoms are fluctuating - saw pain management - recommended x-ray  New Symptoms/ Problems: NA  Response to Last Treatment: no adverse reaction  HEP/ Activity Recommendations:  performing regularly - feeling a little less pain after  Progress Towards Goals: hip is feeling a little better with exercises    Objective: See treatment diary below      Assessment:   Stage: chronic   Etiology: deconditioning  Stability of Symptoms:  At Evaluation: Stable - unchanged  Global Rating of Change:   Symptom Irritability Level: Moderate  Primary Movement System Diagnosis: Motor Control  Primary Pain Phenotype: Nociceptive  Patient Specific Functional Scale:   25: lift and carry groceries without increased pain 0/10, return to walking over 1 mile 0/10, return to fishing without pain getting to the location 0/10; Total 0/30   Patient Acceptable Symptom State: unacceptable  FOTO Prediction: 50 by visit 12  FOTO Progress: 38 at evaluation   Greatest Concern: difficulty walking and standing   Current Activity Plan: added to HEP ()  Current Educational Needs: appropriate activity modifications, appropriate activity progressions, realistic expectations, timeframe for recovery, next steps to take to progress towards goals      Patient had good tolerance to manual treatment - noted less pain in hip and lower back following.  He demonstrated good form with exercises - was able to progress with step ups and leg press without pain.  Skilled PT continues to be required to guide progression of motor control to allow him to return to lifting and carrying for home activities and walking longer distances.       Plan: Continue with POC.   "Monitor tolerance to last treatment and activity recommendations.  Follow up with me on 6/17/25.  Continue progressing control and strength with progressive WB exercises.           Daily Treatment Diary     DX: (L) Hip Pain  Follow Up with Referring Provider:   Precautions: fall risk   CO-MORBIDITIES: Hx of neck fusion, (B) TKA, A-fib, CHF       POC Expires Reeval for Medicare to be completed Unit Limit Auth Expiration Date PT/OT/STVisit Limit   7/31/25 By visit NA  NA 12/31/25 BOMN    Completed on visit                  Auth Status DATE 6/10        NA Visit # 2         Remaining         MANUAL THERAPY         Lumbar Mobs  (R) S/L  Gr 3/4  8 min                                                     THERAPEUTIC EXERCISE HEP         Recumbent Bike  6 min                  Standing Hip Flex 6/5         Standing Hip ABD 6/5         Standing Hip Ext 6/5                             Supine March  15x ea        TB H/L Hip ABD          S/L Clamshell   15x ea        Bridge  5\"x20                  Knee Ext Machine          Leg Press  DL  95#  20x                            NEUROMUSCULAR REEDUCATION           Side stepping with LE cone taps                    FWD Mini Lunge          LAT Mini Lunge          Squat                              FWD Step Up  6\" Step  15x ea        LAT Step Up  6\" Step  15x ea                                                          THERAPEUTIC ACTIVITY                                                  GAIT TRAINING                                                  MODALITIES                                         "

## 2025-06-11 ENCOUNTER — RESULTS FOLLOW-UP (OUTPATIENT)
Dept: FAMILY MEDICINE CLINIC | Facility: HOSPITAL | Age: 75
End: 2025-06-11

## 2025-06-11 ENCOUNTER — ANTICOAG VISIT (OUTPATIENT)
Dept: FAMILY MEDICINE CLINIC | Facility: HOSPITAL | Age: 75
End: 2025-06-11

## 2025-06-11 LAB
ALBUMIN SERPL-MCNC: 4.3 G/DL (ref 3.8–4.8)
ALP SERPL-CCNC: 101 IU/L (ref 44–121)
ALT SERPL-CCNC: 13 IU/L (ref 0–44)
AST SERPL-CCNC: 21 IU/L (ref 0–40)
BASOPHILS # BLD AUTO: 0 X10E3/UL (ref 0–0.2)
BASOPHILS NFR BLD AUTO: 1 %
BILIRUB SERPL-MCNC: 0.6 MG/DL (ref 0–1.2)
BUN SERPL-MCNC: 26 MG/DL (ref 8–27)
BUN/CREAT SERPL: 13 (ref 10–24)
CALCIUM SERPL-MCNC: 9.8 MG/DL (ref 8.6–10.2)
CHLORIDE SERPL-SCNC: 97 MMOL/L (ref 96–106)
CO2 SERPL-SCNC: 24 MMOL/L (ref 20–29)
CREAT SERPL-MCNC: 2 MG/DL (ref 0.76–1.27)
EGFR: 34 ML/MIN/1.73
EOSINOPHIL # BLD AUTO: 0.1 X10E3/UL (ref 0–0.4)
EOSINOPHIL NFR BLD AUTO: 2 %
ERYTHROCYTE [DISTWIDTH] IN BLOOD BY AUTOMATED COUNT: 12.8 % (ref 11.6–15.4)
GLOBULIN SER-MCNC: 2.6 G/DL (ref 1.5–4.5)
GLUCOSE SERPL-MCNC: 114 MG/DL (ref 70–99)
HCT VFR BLD AUTO: 36.1 % (ref 37.5–51)
HGB BLD-MCNC: 11.5 G/DL (ref 13–17.7)
IMM GRANULOCYTES # BLD: 0 X10E3/UL (ref 0–0.1)
IMM GRANULOCYTES NFR BLD: 0 %
INR PPP: 2.7 (ref 0.9–1.2)
LYMPHOCYTES # BLD AUTO: 0.9 X10E3/UL (ref 0.7–3.1)
LYMPHOCYTES NFR BLD AUTO: 18 %
MCH RBC QN AUTO: 29.9 PG (ref 26.6–33)
MCHC RBC AUTO-ENTMCNC: 31.9 G/DL (ref 31.5–35.7)
MCV RBC AUTO: 94 FL (ref 79–97)
MONOCYTES # BLD AUTO: 0.4 X10E3/UL (ref 0.1–0.9)
MONOCYTES NFR BLD AUTO: 8 %
NEUTROPHILS # BLD AUTO: 3.9 X10E3/UL (ref 1.4–7)
NEUTROPHILS NFR BLD AUTO: 71 %
PLATELET # BLD AUTO: 224 X10E3/UL (ref 150–450)
POTASSIUM SERPL-SCNC: 4 MMOL/L (ref 3.5–5.2)
PROT SERPL-MCNC: 6.9 G/DL (ref 6–8.5)
PROTHROMBIN TIME: 26.9 SEC (ref 9.1–12)
RBC # BLD AUTO: 3.85 X10E6/UL (ref 4.14–5.8)
SODIUM SERPL-SCNC: 140 MMOL/L (ref 134–144)
WBC # BLD AUTO: 5.4 X10E3/UL (ref 3.4–10.8)

## 2025-06-12 ENCOUNTER — RESULTS FOLLOW-UP (OUTPATIENT)
Dept: PAIN MEDICINE | Facility: CLINIC | Age: 75
End: 2025-06-12

## 2025-06-16 DIAGNOSIS — M54.16 LUMBAR RADICULOPATHY: Primary | ICD-10-CM

## 2025-06-17 ENCOUNTER — OFFICE VISIT (OUTPATIENT)
Dept: PHYSICAL THERAPY | Facility: CLINIC | Age: 75
End: 2025-06-17
Attending: INTERNAL MEDICINE
Payer: COMMERCIAL

## 2025-06-17 ENCOUNTER — TELEPHONE (OUTPATIENT)
Dept: RADIOLOGY | Facility: CLINIC | Age: 75
End: 2025-06-17

## 2025-06-17 DIAGNOSIS — D68.9 COAGULATION DEFECT (HCC): Primary | ICD-10-CM

## 2025-06-17 DIAGNOSIS — M25.552 LEFT HIP PAIN: Primary | ICD-10-CM

## 2025-06-17 PROCEDURE — 97112 NEUROMUSCULAR REEDUCATION: CPT | Performed by: PHYSICAL THERAPIST

## 2025-06-17 PROCEDURE — 97110 THERAPEUTIC EXERCISES: CPT | Performed by: PHYSICAL THERAPIST

## 2025-06-17 NOTE — TELEPHONE ENCOUNTER
Jessi Small PA-C to Spine & Pain Surgery Coordinator [facility] (Selected Message)      6/16/25 10:03 AM  Result Note  Order entered.   XR hip/pelv 2-3 vws left if performed    6/12/25  3:18 PM  Nette Siegel RN routed this conversation to FABIOLA Ortega RN  BS    6/12/25  3:18 PM  Note     S/w pt and advised of same. Pt verbalized understanding and appreciation.     Pt would like to schedule NATHALIA.  Pt takes Coumadin prescribed by Dr Santillan.     Advised Coumadin hold approval will need to be obtained and that pt would be contacted to schedule.

## 2025-06-17 NOTE — TELEPHONE ENCOUNTER
RN s/w pt and instructed pt to report to [facility] outpatient lab morning of 7/7 procedure. RN placed order for stat PT/INR.

## 2025-06-17 NOTE — TELEPHONE ENCOUNTER
Caller: Patient    Doctor: Dr. PINK    Reason for call: Patient State he is able to get his lab work in network     Please advise with patient    Call back#:

## 2025-06-17 NOTE — PROGRESS NOTES
Daily Note     Today's date: 2025  Patient name: Tian Garcia  : 1950  MRN: [id number]  Referring provider: Aneta Santillan DO  Dx:   Encounter Diagnosis     ICD-10-CM    1. Left hip pain  M25.552                      Subjective:   Current Status: scheduled for lumbar injection - hip is feeling a little sore today   New Symptoms/ Problems: NA  Response to Last Treatment: no adverse reaction  HEP/ Activity Recommendations:  performing regularly - feeling a little less pain after  Progress Towards Goals: hip is feeling a little better with exercises    Objective: See treatment diary below      Assessment:   Stage: chronic   Etiology: deconditioning  Stability of Symptoms:  At Evaluation: Stable - unchanged  Global Rating of Change:   Symptom Irritability Level: Moderate  Primary Movement System Diagnosis: Motor Control  Primary Pain Phenotype: Nociceptive  Patient Specific Functional Scale:   25: lift and carry groceries without increased pain 0/10, return to walking over 1 mile 0/10, return to fishing without pain getting to the location 0/10; Total 0/30   Patient Acceptable Symptom State: unacceptable  FOTO Prediction: 50 by visit 12  FOTO Progress: 38 at evaluation   Greatest Concern: difficulty walking and standing   Current Activity Plan: added to HEP ()  Current Educational Needs: appropriate activity modifications, appropriate activity progressions, realistic expectations, timeframe for recovery, next steps to take to progress towards goals      Patient continues to respond well to manual treatment - feeling less soreness in the lower back following.  He was challenged with hip strengthening exercises - noted no increased pain with progressions with leg press or step height.  Skilled PT continues to be required to guide progression of motor control to allow him to return to lifting and carrying for home activities and walking longer distances.       Plan: Continue with POC.  Monitor tolerance  "to last treatment and activity recommendations.  Follow up with me on 6/19/25.  Continue progressing control and strength with progressive WB exercises.           Daily Treatment Diary     DX: (L) Hip Pain  Follow Up with Referring Provider:   Precautions: fall risk   CO-MORBIDITIES: Hx of neck fusion, (B) TKA, A-fib, CHF       POC Expires Reeval for Medicare to be completed Unit Limit Auth Expiration Date PT/OT/STVisit Limit   7/31/25 By visit NA  NA 12/31/25 BOMN    Completed on visit                  Auth Status DATE 6/10 6/17       NA Visit # 2 3        Remaining         MANUAL THERAPY         Lumbar Mobs  (R) S/L  Gr 3/4  8 min (R) S/L  Gr 3/4  8 min                                                    THERAPEUTIC EXERCISE HEP         Recumbent Bike  6 min 6 min                 Standing Hip Flex 6/5         Standing Hip ABD 6/5         Standing Hip Ext 6/5                             Supine March  15x ea 20x ea       TB H/L Hip ABD          S/L Clamshell   15x ea 20x ea       Bridge  5\"x20 5\"x20                 Knee Ext Machine          Leg Press  DL  95#  20x DL  105#  20x2                           NEUROMUSCULAR REEDUCATION           Side stepping with LE cone taps                    FWD Mini Lunge          LAT Mini Lunge          Squat                              FWD Step Up  6\" Step  15x ea 8\" Step  20x ea       LAT Step Up  6\" Step  15x ea 8\" Step  20x ea                                                         THERAPEUTIC ACTIVITY                                                  GAIT TRAINING                                                  MODALITIES                                         "

## 2025-06-17 NOTE — TELEPHONE ENCOUNTER
S/w pt, reviewed Coumadin hold instructions 7/2-7/7, LD 7/1. Pt said because of his insurance he can only get his labs at [facility]. RN explained that we would need the blood work to be drawn the day of the procedure and receive the results back in time. Pt said it typically takes 1 days to hear back about his results. RN attempted to reach out to [facility] to discuss, no direct line- called corporate # and placed on hold for 11 mins then disconnected. RN called pt back and explained, pt is going to stop by the [facility] in [place] this week to ask them about timing and results be sent day of then will call us back. Pt understands if not will have to complete at  lab and may have to file a medical exception with insurance to complete here.

## 2025-06-17 NOTE — TELEPHONE ENCOUNTER
Dr. Dao has recommended an Epidural   Steroid Injection (NATHALIA) to help with this   patient's chronic pain. However, the patient  Is currently taking Coumadin which you are   managing. The American Society of   Regional Anesthesia Guideline on   neuraxial procedures and anti-coagulation   indicates that Coumadin should be held for   5 days prior to the procedure.      Your permission to hold Coumadin is necessary   in order to proceed. We will instruct the   patient to restart this medication immediately   after the procedure, unless otherwise specified   by you.       ??  *please keep this task in clinical pool until   we get response back. (if forwarding this to  ask to another office or physician, please   keep clinical pool on the recipient list for   tracking purposes)

## 2025-06-17 NOTE — TELEPHONE ENCOUNTER
PRE OP INSTRUCTIONS:           Hold Warfrin x 5 full days prior, last dose on JULY 1 for procedure on July 7             Patient questioning where and when to get labs done as he has to go to a [facility] states that he can not go to [facility] patient states that they are not open on Sundays and would need to get labs done on Saturday before procedure on Monday              Patient advised to hold medication from Date till their appointment at that time instructions to restart will be given.    Patient stated verbal understanding. Aware that nursing WILL call to review hold dates as well    -If you are on prescription blood thinners, you may have to hold the medication for several days before the procedure.    Please call the office to discuss medication holds at [phone].  -Do not eat or drink ONE HOUR prior to your procedure. If you are diabetic, may follow regular breakfast/lunch schedule and take usual    diabetic medications.  -Lumbar( low back) procedure, please wear comfortable slacks/pants.  -Cervical (neck) procedure, please wear a shirt/blouse that is easy to remove. PATIENT INSTRUCTED TO STOP ALL NSAID'S 4 DAYS             PRIOR TO PROCEDURE EXCEPT FOR IBUPROFEN IT CAN BE TAKEN UP TO 24 HOURS PRIOR TO PROCEDURE.   -A  is required to take you home form your procedure.  -Continue all to take prescribed medication the day of your procedure, including blood pressure medications.  -If you are prescribe antibiotics, have an active infection or have an open wound, please contact the office at [phone].  -Please refrain from any vaccinations two days before and two days after injection.  -Insurance authorization received in not a guarantee of payment per your insurance company's authorization disclaimer and it is    your responsibility to verify your benefits.          -If you have any questions about the instructions, please call me at [phone]     Explained NATHALIA as an injection of steroids into an  affected area to reduce swelling and provide relief. This procedure does not provide instant relief.   Allow 3-5 days for the steroid to begin to work. During this time, there may be changes in your pain, it may even get worse before it gets better.    This office will fu in 7 days to determine the effectiveness of the procedure. Be advised, the procedure may need to be repeated to provide adequate relief.   There is also a chance that the procedures will fail to relieve pain. There are few restrictions after the procedure. The day of the procedure, please go home and rest. Do not drive.   Ok to resume normal activities 24 - 48 hours after the procedure as tolerated. Do not submerge the site for 2 days after the procedure.    Be aware, steroids will raise blood sugar levels for ~ 2 weeks after the procedure.

## 2025-06-19 ENCOUNTER — OFFICE VISIT (OUTPATIENT)
Dept: PHYSICAL THERAPY | Facility: CLINIC | Age: 75
End: 2025-06-19
Attending: INTERNAL MEDICINE
Payer: COMMERCIAL

## 2025-06-19 DIAGNOSIS — M25.552 LEFT HIP PAIN: Primary | ICD-10-CM

## 2025-06-19 PROCEDURE — 97110 THERAPEUTIC EXERCISES: CPT | Performed by: PHYSICAL THERAPIST

## 2025-06-19 PROCEDURE — 97112 NEUROMUSCULAR REEDUCATION: CPT | Performed by: PHYSICAL THERAPIST

## 2025-06-19 NOTE — PROGRESS NOTES
Daily Note     Today's date: 2025  Patient name: Tian Garcia  : 1950  MRN: [id number]  Referring provider: Aneta Santillan DO  Dx:   Encounter Diagnosis     ICD-10-CM    1. Left hip pain  M25.552                      Subjective:   Current Status: leg is feeling good today - back is also feeling a little better  New Symptoms/ Problems: NA  Response to Last Treatment: no adverse reaction  HEP/ Activity Recommendations:  performing regularly - feeling a little less pain after  Progress Towards Goals: hip is feeling a little better with exercises - seeing improvements with strength    Objective: See treatment diary below      Assessment:   Stage: chronic   Etiology: deconditioning  Stability of Symptoms:  At Evaluation: Stable - unchanged  Global Rating of Change:   Symptom Irritability Level: Moderate  Primary Movement System Diagnosis: Motor Control  Primary Pain Phenotype: Nociceptive  Patient Specific Functional Scale:   25: lift and carry groceries without increased pain 0/10, return to walking over 1 mile 0/10, return to fishing without pain getting to the location 0/10; Total 0/30   Patient Acceptable Symptom State: unacceptable  FOTO Prediction: 50 by visit 12  FOTO Progress: 38 at evaluation   Greatest Concern: difficulty walking and standing   Current Activity Plan: added to HEP ()  Current Educational Needs: appropriate activity modifications, appropriate activity progressions, realistic expectations, timeframe for recovery, next steps to take to progress towards goals      Patient continues to respond well to manual treatment.  He continues to progress with LE strength.  He is making steady progress with PT.  Skilled PT continues to be required to guide progression of motor control to allow him to return to lifting and carrying for home activities and walking longer distances.       Plan: Continue with POC.  Monitor tolerance to last treatment and activity recommendations.  Follow up with  "me on 6/24/25.  Continue progressing control and strength with progressive WB exercises.           Daily Treatment Diary     DX: (L) Hip Pain  Follow Up with Referring Provider:   Precautions: fall risk   CO-MORBIDITIES: Hx of neck fusion, (B) TKA, A-fib, CHF       POC Expires Reeval for Medicare to be completed Unit Limit Auth Expiration Date PT/OT/STVisit Limit   7/31/25 By visit NA  NA 12/31/25 BOMN    Completed on visit                  Auth Status DATE 6/10 6/17 6/19      NA Visit # 2 3 4       Remaining         MANUAL THERAPY         Lumbar Mobs  (R) S/L  Gr 3/4  8 min (R) S/L  Gr 3/4  8 min (R) S/L  Gr 3/4  8 min                                                   THERAPEUTIC EXERCISE HEP         Recumbent Bike  6 min 6 min 6 min                Standing Hip Flex 6/5         Standing Hip ABD 6/5         Standing Hip Ext 6/5                             Supine March  15x ea 20x ea 20x ea      TB H/L Hip ABD          S/L Clamshell   15x ea 20x ea 20x ea      Bridge  5\"x20 5\"x20 5\"x20                Knee Ext Machine          Leg Press  DL  95#  20x DL  105#  20x2 DL  115#  20x  125#  25x                          NEUROMUSCULAR REEDUCATION           Side stepping with LE cone taps                    FWD Mini Lunge          LAT Mini Lunge          Squat                              FWD Step Up  6\" Step  15x ea 8\" Step  20x ea 8\" Step  20x ea      LAT Step Up  6\" Step  15x ea 8\" Step  20x ea 8\" Step  20x ea                                                        THERAPEUTIC ACTIVITY                                                  GAIT TRAINING                                                  MODALITIES                                         "

## 2025-06-24 ENCOUNTER — APPOINTMENT (OUTPATIENT)
Dept: PHYSICAL THERAPY | Facility: CLINIC | Age: 75
End: 2025-06-24
Attending: INTERNAL MEDICINE
Payer: COMMERCIAL

## 2025-06-26 ENCOUNTER — OFFICE VISIT (OUTPATIENT)
Dept: PHYSICAL THERAPY | Facility: CLINIC | Age: 75
End: 2025-06-26
Attending: INTERNAL MEDICINE
Payer: COMMERCIAL

## 2025-06-26 DIAGNOSIS — M25.552 LEFT HIP PAIN: Primary | ICD-10-CM

## 2025-06-26 PROCEDURE — 97110 THERAPEUTIC EXERCISES: CPT | Performed by: PHYSICAL THERAPIST

## 2025-06-26 PROCEDURE — 97112 NEUROMUSCULAR REEDUCATION: CPT | Performed by: PHYSICAL THERAPIST

## 2025-06-26 NOTE — PROGRESS NOTES
Daily Note     Today's date: 2025  Patient name: Tian Garcia  : 1950  MRN: [id number]  Referring provider: Aneta Santillan DO  Dx:   Encounter Diagnosis     ICD-10-CM    1. Left hip pain  M25.552                      Subjective:   Current Status: leg is feeling good today - getting stronger - back continues to have some pain  New Symptoms/ Problems: NA  Response to Last Treatment: no adverse reaction  HEP/ Activity Recommendations:  performing regularly - feeling a little less pain after  Progress Towards Goals: hip is feeling a little better with exercises - seeing improvements with strength    Objective: See treatment diary below      Assessment:   Stage: chronic   Etiology: deconditioning  Stability of Symptoms:  At Evaluation: Stable - unchanged  Global Rating of Change:   Symptom Irritability Level: Moderate  Primary Movement System Diagnosis: Motor Control  Primary Pain Phenotype: Nociceptive  Patient Specific Functional Scale:   25: lift and carry groceries without increased pain 0/10, return to walking over 1 mile 0/10, return to fishing without pain getting to the location 0/10; Total 0/30   Patient Acceptable Symptom State: unacceptable  FOTO Prediction: 50 by visit 12  FOTO Progress: 38 at evaluation   Greatest Concern: difficulty walking and standing   Current Activity Plan: added to HEP ()  Current Educational Needs: appropriate activity modifications, appropriate activity progressions, realistic expectations, timeframe for recovery, next steps to take to progress towards goals      Patient continues to respond well to manual treatment - less back and hip pain following. He is progressing well with leg strength for step ups and walking.  He continues to be limited with balance and endurance.   Skilled PT continues to be required to guide progression of motor control to allow him to return to lifting and carrying for home activities and walking longer distances.       Plan: Continue  "with POC.  Monitor tolerance to last treatment and activity recommendations.  Follow up with Jose on 6/30/25.  Continue progressing control and strength with progressive WB exercises.           Daily Treatment Diary     DX: (L) Hip Pain  Follow Up with Referring Provider:   Precautions: fall risk   CO-MORBIDITIES: Hx of neck fusion, (B) TKA, A-fib, CHF       POC Expires Reeval for Medicare to be completed Unit Limit Auth Expiration Date PT/OT/STVisit Limit   7/31/25 By visit NA  NA 12/31/25 BOMN    Completed on visit                  Auth Status DATE 6/10 6/17 6/19 6/26     NA Visit # 2 3 4 5      Remaining         MANUAL THERAPY         Lumbar Mobs  (R) S/L  Gr 3/4  8 min (R) S/L  Gr 3/4  8 min (R) S/L  Gr 3/4  8 min                                                   THERAPEUTIC EXERCISE HEP         Recumbent Bike  6 min 6 min 6 min 6 min               Standing Hip Flex 6/5         Standing Hip ABD 6/5         Standing Hip Ext 6/5                             Supine March  15x ea 20x ea 20x ea 20x ea     TB H/L Hip ABD          S/L Clamshell   15x ea 20x ea 20x ea 20x ea     Bridge  5\"x20 5\"x20 5\"x20 5\"x20               Knee Ext Machine          Leg Press  DL  95#  20x DL  105#  20x2 DL  115#  20x  125#  25x DL  125#  50x                         NEUROMUSCULAR REEDUCATION           Side stepping with LE cone taps                    FWD Mini Lunge          LAT Mini Lunge          Squat                              FWD Step Up  6\" Step  15x ea 8\" Step  20x ea 8\" Step  20x ea 8\" Step  20x ea     LAT Step Up  6\" Step  15x ea 8\" Step  20x ea 8\" Step  20x ea 8\" Step  20x ea                                                       THERAPEUTIC ACTIVITY                                                  GAIT TRAINING                                                  MODALITIES                                         "

## 2025-06-30 ENCOUNTER — OFFICE VISIT (OUTPATIENT)
Dept: PHYSICAL THERAPY | Facility: CLINIC | Age: 75
End: 2025-06-30
Attending: INTERNAL MEDICINE
Payer: COMMERCIAL

## 2025-06-30 DIAGNOSIS — M25.552 LEFT HIP PAIN: Primary | ICD-10-CM

## 2025-06-30 PROCEDURE — 97110 THERAPEUTIC EXERCISES: CPT

## 2025-06-30 PROCEDURE — 97112 NEUROMUSCULAR REEDUCATION: CPT

## 2025-06-30 NOTE — PROGRESS NOTES
"Daily Note     Today's date: 2025  Patient name: Tian Garcia  : 1950  MRN: [id number]  Referring provider: Aneta Santillan DO  Dx:   Encounter Diagnosis     ICD-10-CM    1. Left hip pain  M25.552           Start Time: 828  Stop Time: 920  Total time in clinic (min): 52 minutes    Subjective: Patient reports that he feels good today and has felt good for 2 days. He denies any adverse reactions to previous session.       Objective: See treatment diary below      Assessment: Added hold time to sidelying clamshells with appropriate challenge and muscular fatigue. Patient was visibly challenged by program but able to complete as instructed. He asked about possibly using Biodex NV to address concerns with balance. Discussed with evaluating PT and will add NV if appropriate. Tolerated treatment well. Patient demonstrated fatigue post treatment, exhibited good technique with therapeutic exercises, and would benefit from continued PT      Plan: Continue per plan of care.  Progress treatment as tolerated.       Daily Treatment Diary     DX: (L) Hip Pain  Follow Up with Referring Provider:   Precautions: fall risk   CO-MORBIDITIES: Hx of neck fusion, (B) TKA, A-fib, CHF       POC Expires Reeval for Medicare to be completed Unit Limit Auth Expiration Date PT/OT/STVisit Limit   25 By visit NA  NA 25 BOMN    Completed on visit                  Auth Status DATE 6/10 6/17 6/19 6/26 6/30    NA Visit # 2 3 4 5 6     Remaining         MANUAL THERAPY         Lumbar Mobs  (R) S/L  Gr 3/4  8 min (R) S/L  Gr 3/4  8 min (R) S/L  Gr 3/4  8 min                                                   THERAPEUTIC EXERCISE HEP         Recumbent Bike  6 min 6 min 6 min 6 min 6 min              Standing Hip Flex 6/5         Standing Hip ABD 6/5         Standing Hip Ext 6/5                             Supine March  15x ea 20x ea 20x ea 20x ea 3\" x20 ea    TB H/L Hip ABD          S/L Clamshell   15x ea 20x ea 20x ea 20x ea 5\" x20 " "ea TB?   Bridge  5\"x20 5\"x20 5\"x20 5\"x20 5\" x20              Knee Ext Machine          Leg Press  DL  95#  20x DL  105#  20x2 DL  115#  20x  125#  25x DL  125#  50x # x40                        NEUROMUSCULAR REEDUCATION           Side stepping with LE cone taps          Bi[employer]       ?   FWD Mini Lunge          LAT Mini Lunge          Squat                              FWD Step Up  6\" Step  15x ea 8\" Step  20x ea 8\" Step  20x ea 8\" Step  20x ea 8\" Step  20x ea    LAT Step Up  6\" Step  15x ea 8\" Step  20x ea 8\" Step  20x ea 8\" Step  20x ea 8\" Step  20x ea                                                      THERAPEUTIC ACTIVITY                                                  GAIT TRAINING                                                  MODALITIES                                           "

## 2025-07-02 ENCOUNTER — OFFICE VISIT (OUTPATIENT)
Dept: PHYSICAL THERAPY | Facility: CLINIC | Age: 75
End: 2025-07-02
Attending: INTERNAL MEDICINE
Payer: COMMERCIAL

## 2025-07-02 DIAGNOSIS — M25.552 LEFT HIP PAIN: Primary | ICD-10-CM

## 2025-07-02 PROCEDURE — 97112 NEUROMUSCULAR REEDUCATION: CPT | Performed by: PHYSICAL THERAPIST

## 2025-07-02 PROCEDURE — 97110 THERAPEUTIC EXERCISES: CPT | Performed by: PHYSICAL THERAPIST

## 2025-07-02 NOTE — PROGRESS NOTES
"Daily Note     Today's date: 2025  Patient name: Tian Garcia  : 1950  MRN: [id number]  Referring provider: Aneta Santillan DO  Dx:   Encounter Diagnosis     ICD-10-CM    1. Left hip pain  M25.552                  Pt arrived 10 minutes late.     Subjective: Pt reports he is sore today in the low back and bilateral knees. Has not been sore following PT sessions.       Objective: See treatment diary below      Assessment: Added theraband this session with inc fatigue noted with clamshells. Dec ROM performed with R hip>L hip with addition of band requiring inc cues for form to dec trunk rotation. Inc lightheadedness towards end of session therefore provided seated rest break and assessed BP; BP taken on L arm in sittin/80. Educated pt to hydrate and eat something prior to therapy to assist with symptoms. Inc fatigue overall at end of session.       Plan: Continue per plan of care.  Progress treatment as tolerated.       Daily Treatment Diary     DX: (L) Hip Pain  Follow Up with Referring Provider:   Precautions: fall risk   CO-MORBIDITIES: Hx of neck fusion, (B) TKA, A-fib, CHF       POC Expires Reeval for Medicare to be completed Unit Limit Auth Expiration Date PT/OT/STVisit Limit   25 By visit NA  NA 25 BOMN    Completed on visit                  Auth Status DATE 6/10 6/17 6/19 6/26 6/30 7/   NA Visit # 2 3 4 5 6 7    Remaining         MANUAL THERAPY         Lumbar Mobs  (R) S/L  Gr 3/4  8 min (R) S/L  Gr 3/4  8 min (R) S/L  Gr 3/4  8 min                                                   THERAPEUTIC EXERCISE HEP         Recumbent Bike  6 min 6 min 6 min 6 min 6 min L1 6'             Standing Hip Flex 6/5         Standing Hip ABD 6/5         Standing Hip Ext 6/5                             Supine March  15x ea 20x ea 20x ea 20x ea 3\" x20 ea OTB  3\"x20 ea.    TB H/L Hip ABD          S/L Clamshell   15x ea 20x ea 20x ea 20x ea 5\" x20 ea OTB  2\" 2x10 ea.    Bridge  5\"x20 5\"x20 5\"x20 5\"x20 5\" " "x20 OTB  5\"x20             Knee Ext Machine          Leg Press  DL  95#  20x DL  105#  20x2 DL  115#  20x  125#  25x DL  125#  50x # x40 # x40                       NEUROMUSCULAR REEDUCATION           Side stepping with LE cone taps          Biodex       NV   FWD Mini Lunge          LAT Mini Lunge          Squat                              FWD Step Up  6\" Step  15x ea 8\" Step  20x ea 8\" Step  20x ea 8\" Step  20x ea 8\" Step  20x ea 8\" step   1x20 ea.    LAT Step Up  6\" Step  15x ea 8\" Step  20x ea 8\" Step  20x ea 8\" Step  20x ea 8\" Step  20x ea 8\" step  1x20 ea.                                                      THERAPEUTIC ACTIVITY                                                  GAIT TRAINING                                                  MODALITIES                                           "

## 2025-07-03 ENCOUNTER — OFFICE VISIT (OUTPATIENT)
Age: 75
End: 2025-07-03
Payer: COMMERCIAL

## 2025-07-03 VITALS
BODY MASS INDEX: 32.45 KG/M2 | HEART RATE: 73 BPM | TEMPERATURE: 97.8 F | OXYGEN SATURATION: 97 % | SYSTOLIC BLOOD PRESSURE: 108 MMHG | HEIGHT: 71 IN | DIASTOLIC BLOOD PRESSURE: 62 MMHG | WEIGHT: 231.8 LBS

## 2025-07-03 DIAGNOSIS — G47.33 OSA (OBSTRUCTIVE SLEEP APNEA): Primary | ICD-10-CM

## 2025-07-03 PROCEDURE — 99204 OFFICE O/P NEW MOD 45 MIN: CPT | Performed by: INTERNAL MEDICINE

## 2025-07-03 NOTE — ASSESSMENT & PLAN NOTE
Patient was diagnosed with sleep apnea  in 2014  HPI at the time was 42  Patient has been using CPAP regularly, stopped using it approximately 3 months ago.  Has been having back pain and shoulder pain and therefore been sleeping in a recliner downstairs [place] but situated upstairs in his bed.  Patient states that there have been problems with his machine which he got approximately 6 to 7 years ago, but he has been unable to get it fixed  Patient unsure if he wants to continue using CPAP and would like to get another sleep study prior to restarting CPAP usage.    Plan:  Will order home sleep study at current time  If home study is positive, patient can schedule an  his new CPAP machine.  Orders:    CPAP Auto New DME

## 2025-07-03 NOTE — PROGRESS NOTES
Consultation - Pulmonary Medicine   Name: Tian Garcia      : 1950      MRN: [id number]  Encounter Provider: Jose Miguel Danielson MD  Encounter Date: 7/3/2025   Encounter department: [facility]    Requesting Provider:   Referral Self  No address on file     Reason for Consult: WILL:  Assessment & Plan  WILL (obstructive sleep apnea)  Patient was diagnosed with sleep apnea  in   HPI at the time was 42  Patient has been using CPAP regularly, stopped using it approximately 3 months ago.  Has been having back pain and shoulder pain and therefore been sleeping in a recliner downstairs [place] but situated upstairs in his bed.  Patient states that there have been problems with his machine which he got approximately 6 to 7 years ago, but he has been unable to get it fixed  Patient unsure if he wants to continue using CPAP and would like to get another sleep study prior to restarting CPAP usage.    Plan:  Will order home sleep study at current time  If home study is positive, patient can schedule an  his new CPAP machine.  Orders:    CPAP Auto New DME      No follow-ups on file.  History of Present Illness   Tian Garcia is a 75 y.o. male who presents for evaluation of his WILL.  Patient reports that he was diagnosed more than a decade ago, but he has not followed up with any sleep medicine physician in this time.  He had a CPAP machine which he was using for the time and it was replaced approximately 6 or 7 years ago after a recall. Since then, he has not changed his machine. More recently, he has stopped using his CPAP for the last 3 months due to feeling that the mask is very forceful.  Alongside that, he has had back pain as well as shoulder pain  for which he is sleeping in a recliner.  Due to that, he is also not using his CPAP.  At this time, he is unsure if he wants to continue CPAP, because he feels he is improving.  He would like to get another sleep study prior to  "restarting CPAP usage.    Review of Systems   Constitutional:  Positive for fatigue. Negative for chills and fever.   Respiratory:  Negative for cough, choking, shortness of breath and wheezing.    Cardiovascular:  Negative for chest pain and palpitations.   Musculoskeletal:  Positive for back pain.   Neurological:  Positive for headaches.   Psychiatric/Behavioral:  Positive for sleep disturbance.        Aside from what is mentioned in the HPI, ROS is otherwise negative    Medical History Reviewed by provider this encounter:     .    Historical Information   Past Medical History[1]  Past Surgical History[2]  Social History   Tobacco Use History[3]    Occupational/Environmental history:  Retired    Family History:   Family History[4]    Objective   /62 (BP Location: Left arm, Patient Position: Sitting, Cuff Size: Standard)   Pulse 73   Temp 97.8 °F (36.6 °C) (Tympanic)   Ht 5' 11\" (1.803 m)   Wt 105 kg (231 lb 12.8 oz)   SpO2 97%   BMI 32.33 kg/m²      Physical Exam  Vitals reviewed.   Constitutional:       General: He is not in acute distress.     Appearance: Normal appearance. He is obese. He is not ill-appearing or toxic-appearing.   HENT:      Head: Normocephalic and atraumatic.     Eyes:      General: No scleral icterus.     Extraocular Movements: Extraocular movements intact.       Cardiovascular:      Heart sounds: No murmur heard.     No friction rub. No gallop.   Pulmonary:      Effort: No respiratory distress.      Breath sounds: No wheezing, rhonchi or rales.     Musculoskeletal:      Cervical back: Normal range of motion.     Skin:     General: Skin is warm.      Coloration: Skin is not jaundiced.     Neurological:      General: No focal deficit present.      Mental Status: He is alert and oriented to person, place, and time.     Psychiatric:         Mood and Affect: Mood normal.           Diagnostic Data:      Other studies:  Sleep study done in 2014 showed AHI of 42/h.  Patient's weight at " that time was 269 pounds.    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, 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, on steroids for prolonged period    Ambulatory dysfunction     cane use    Anemia     Arthritis     Bleeding disorder (HCC)     BPH (benign prostatic hyperplasia)     CHF (congestive heart failure) ([facility])     Chronic kidney disease     stage 3b, baseline Cr 2.2    DDD (degenerative disc disease), cervical     DDD (degenerative disc disease), lumbar     Deep vein thrombosis ([facility])     Diverticulosis     Factor V Leiden ([facility])     Headache(784.0) 3/2022    Never till cervical  spine surgery    History of DVT (deep vein thrombosis)     from Facotr V, on Coumadin    [place] (hard of hearing)     Hyperlipidemia     Hypertension     Insomnia     Ischemic cardiomyopathy     EF 33%    MRSA carrier     Neuropathy     Obesity     WILL on CPAP     Osteoarthritis     Other acute osteomyelitis, other site ([facility]) 01/03/2023    Paroxysmal atrial fibrillation ([facility])     s/p ablation, s/p DCCV, Coumadin, on Amiodarone for    RBBB     Rotator cuff tear     right    Severe aortic stenosis     Steroid-induced hyperglycemia     requiring insulin, w/ MRSA infection 2022, resolved   [2]   Past Surgical History:  Procedure Laterality Date    BACK SURGERY      CARDIAC CATHETERIZATION N/A 04/09/2023    Procedure: Cardiac pci;  Surgeon: Bird Cast MD;  [facility]: [facility];  Service: Cardiology    CARDIAC CATHETERIZATION N/A 04/09/2023    Procedure: Cardiac Coronary Angiogram;  Surgeon: Bird Cast MD;  [facility];  Service: Cardiology    CARDIAC ELECTROPHYSIOLOGY PROCEDURE N/A 01/02/2025    Procedure: Cardiac eps/afib ablation PFA;  Surgeon: Derik Amin MD;  Location: [facility];  Service: Cardiology    CATARACT EXTRACTION Right     COLON SURGERY      COLONOSCOPY      INCISION AND  DRAINAGE POSTERIOR SPINE N/A 04/01/2022    Procedure: Posterior cervical evacuation of postoperative collection and debridement with placement of drains C3-T1;  Surgeon: Rajeev Garcia MD;  Location: [facility];  Service: Neurosurgery    IR BIOPSY KIDNEY RANDOM  05/26/2022    IR TUNNELED DIALYSIS CATHETER PLACEMENT  05/23/2022    IR TUNNELED DIALYSIS CATHETER REMOVAL  06/02/2022    LUMBAR SPINE SURGERY      herniated disc    NY ARTHRD ANT INTERBODY MIN DSC CRV BELOW C2 N/A 03/11/2022    Procedure: Anterior cervical discectomy and fixation fusion C5/6 and C6/7; Posterior cervical decompression and instrumented fusion C3-T1;  Surgeon: Rajeev Garcia MD;  Location: [facility];  Service: Neurosurgery    NY ARTHRP KNE CONDYLE&PLATU MEDIAL&LAT COMPARTMENTS Right 04/08/2024    Procedure: ARTHROPLASTY KNEE TOTAL and all associated procedures;  Surgeon: Dot Galindo MD;  Location[facility];  Service: Orthopedics    NY ARTHRP KNE CONDYLE&PLATU MEDIAL&LAT COMPARTMENTS Left 09/23/2024    Procedure: ARTHROPLASTY KNEE TOTAL;  Surgeon: Dot Galindo MD;  [facility];  Service: Orthopedics    RENAL BIOPSY  6/2022    SPINE SURGERY  03/11/2022    Cervical myelopathy/ cervical fusion   [3]   Social History  Tobacco Use   Smoking Status Never   Smokeless Tobacco Never   [4]   Family History  Problem Relation Name Age of Onset    Lung cancer Mother      Hearing loss Father Father     Emphysema Sister      Heart attack Brother      Atrial fibrillation Brother      Heart failure Brother      Other (atrial fib) Brother      Clotting disorder Son

## 2025-07-07 ENCOUNTER — APPOINTMENT (OUTPATIENT)
Dept: RADIOLOGY | Facility: CLINIC | Age: 75
End: 2025-07-07
Attending: PHYSICIAN ASSISTANT
Payer: COMMERCIAL

## 2025-07-07 ENCOUNTER — TELEPHONE (OUTPATIENT)
Age: 75
End: 2025-07-07

## 2025-07-07 ENCOUNTER — APPOINTMENT (OUTPATIENT)
Dept: LAB | Facility: HOSPITAL | Age: 75
End: 2025-07-07
Payer: COMMERCIAL

## 2025-07-07 ENCOUNTER — TELEPHONE (OUTPATIENT)
Dept: PAIN MEDICINE | Facility: CLINIC | Age: 75
End: 2025-07-07

## 2025-07-07 NOTE — TELEPHONE ENCOUNTER
S/w pt, advised that his PT/INR of today is too high to proceed with his TFESI scheduled for today. Per pt, he believes he took the coumadin out of his pill box but it is possible that he is mistaken. Pt stated that he is scheduled to fu with Dr. Santillan on Wednesday. Advised pt to fu with Dr. Santillan as discussed, resume coumadin now. This office will wait for Dr. Santillan's ok / update after his ov on wednesday to cb and reschedule procedure. Pt verbalized understanding and appreciation.         S/w Alisson at Dr. Santillan's office, confirmed above. Provided cb number and office hours. Per Alisson, Dr. Santillan's office will cb to advise after the pt's ov on 7/9/25.        Forwarding to \[facility] [place] [facility]\"" dudley nunes  ROBERT

## 2025-07-07 NOTE — TELEPHONE ENCOUNTER
Pt was sched for a Left L3 L4 epidural steroid injection and procedure was cx due to high coumadin numbers. Pt is not resched yet as they are requesting a call back from PCP to advise.

## 2025-07-08 ENCOUNTER — OFFICE VISIT (OUTPATIENT)
Dept: PHYSICAL THERAPY | Facility: CLINIC | Age: 75
End: 2025-07-08
Attending: INTERNAL MEDICINE
Payer: COMMERCIAL

## 2025-07-08 DIAGNOSIS — M25.552 LEFT HIP PAIN: Primary | ICD-10-CM

## 2025-07-08 PROCEDURE — 97110 THERAPEUTIC EXERCISES: CPT | Performed by: PHYSICAL THERAPIST

## 2025-07-08 PROCEDURE — 97112 NEUROMUSCULAR REEDUCATION: CPT | Performed by: PHYSICAL THERAPIST

## 2025-07-08 NOTE — PROGRESS NOTES
Daily Note     Today's date: 2025  Patient name: Tian Garcia  : 1950  MRN: [id number]  Referring provider: Aneta Santillan DO  Dx:   Encounter Diagnosis     ICD-10-CM    1. Left hip pain  M25.552                      Subjective:  Current Status: back is feeling a little better today as well as (L) hip  New Symptoms/ Problems: NA  Response to Last Treatment: no adverse reaction  HEP/ Activity Recommendations:  performing regularly - feeling a little less pain after  Progress Towards Goals: hip is feeling a little better with exercises - seeing improvements with strength    Objective: See treatment diary below      Assessment:   Stage: chronic   Etiology: deconditioning  Stability of Symptoms:  At Evaluation: Stable - unchanged  Global Rating of Change:   Symptom Irritability Level: Moderate  Primary Movement System Diagnosis: Motor Control  Primary Pain Phenotype: Nociceptive  Patient Specific Functional Scale:   25: lift and carry groceries without increased pain 0/10, return to walking over 1 mile 0/10, return to fishing without pain getting to the location 0/10; Total 0/30   Patient Acceptable Symptom State: unacceptable  FOTO Prediction: 50 by visit 12  FOTO Progress: 38 at evaluation   Greatest Concern: difficulty walking and standing   Current Activity Plan: added to HEP ()  Current Educational Needs: appropriate activity modifications, appropriate activity progressions, realistic expectations, timeframe for recovery, next steps to take to progress towards goals      Patient reports good tolerance to manual treatment - less back pain following.  He was challenged with biodex - would like to continue working on balance this way.  He is progressing well with leg press and step up exercises.  Skilled PT continues to be required to guide progression of motor control to allow him to return to lifting and carrying for home activities and walking longer distances.       Plan: Continue with POC.   "Monitor tolerance to last treatment and activity recommendations.  Follow up with me on 7/10/25.  Continue progressing control and strength with progressive WB exercises.       Daily Treatment Diary     DX: (L) Hip Pain  Follow Up with Referring Provider:   Precautions: fall risk   CO-MORBIDITIES: Hx of neck fusion, (B) TKA, A-fib, CHF       POC Expires Reeval for Medicare to be completed Unit Limit Auth Expiration Date PT/OT/STVisit Limit   7/31/25 By visit NA  NA 12/31/25 BOMN    Completed on visit                  Auth Status DATE 6/26 6/30 7/2 7/8     NA Visit # 5 6 7 8      Remaining         MANUAL THERAPY         Lumbar Mobs     (B) S/L  Gr 3/4  4 min ea                                                  THERAPEUTIC EXERCISE HEP         Recumbent Bike  6 min 6 min L1 6' L3  6 min               Standing Hip Flex 6/5         Standing Hip ABD 6/5         Standing Hip Ext 6/5                             Supine March  20x ea 3\" x20 ea OTB  3\"x20 ea.  20x ea     TB H/L Hip ABD          S/L Clamshell   20x ea 5\" x20 ea OTB  2\" 2x10 ea.       Bridge  5\"x20 5\" x20 OTB  5\"x20 5\"x20               Knee Ext Machine          Leg Press  DL  125#  50x # x40 # x40 DL  155#  40x                         NEUROMUSCULAR REEDUCATION           Side stepping with LE cone taps          Biodex    NV Limits of stability  Skill L2  Static L8  Minimal UE support  4x     FWD Mini Lunge          LAT Mini Lunge          Squat                              FWD Step Up  8\" Step  20x ea 8\" Step  20x ea 8\" step   1x20 ea.  8\" Step   Up and Over  20x ea     LAT Step Up  8\" Step  20x ea 8\" Step  20x ea 8\" step  1x20 ea.  8\" Step   Up and Over  20x ea                                                       THERAPEUTIC ACTIVITY                                                  GAIT TRAINING                                                  MODALITIES                                           "

## 2025-07-09 ENCOUNTER — OFFICE VISIT (OUTPATIENT)
Dept: FAMILY MEDICINE CLINIC | Facility: HOSPITAL | Age: 75
End: 2025-07-09
Payer: COMMERCIAL

## 2025-07-09 ENCOUNTER — ANTICOAG VISIT (OUTPATIENT)
Dept: FAMILY MEDICINE CLINIC | Facility: HOSPITAL | Age: 75
End: 2025-07-09

## 2025-07-09 VITALS
BODY MASS INDEX: 32.22 KG/M2 | WEIGHT: 231 LBS | HEART RATE: 72 BPM | OXYGEN SATURATION: 96 % | SYSTOLIC BLOOD PRESSURE: 112 MMHG | DIASTOLIC BLOOD PRESSURE: 58 MMHG

## 2025-07-09 DIAGNOSIS — I35.0 MODERATE AORTIC STENOSIS: ICD-10-CM

## 2025-07-09 DIAGNOSIS — M17.12 PRIMARY OSTEOARTHRITIS OF LEFT KNEE: ICD-10-CM

## 2025-07-09 DIAGNOSIS — R26.89 BALANCE DISORDER: Primary | ICD-10-CM

## 2025-07-09 DIAGNOSIS — I25.5 ISCHEMIC CARDIOMYOPATHY: ICD-10-CM

## 2025-07-09 DIAGNOSIS — R79.1 ELEVATED INR: ICD-10-CM

## 2025-07-09 DIAGNOSIS — I73.9 PERIPHERAL VASCULAR DISEASE, UNSPECIFIED (HCC): ICD-10-CM

## 2025-07-09 DIAGNOSIS — N18.32 STAGE 3B CHRONIC KIDNEY DISEASE (CKD) (HCC): ICD-10-CM

## 2025-07-09 DIAGNOSIS — M51.26 LUMBAR HERNIATED DISC: ICD-10-CM

## 2025-07-09 DIAGNOSIS — I48.0 PAROXYSMAL A-FIB (HCC): ICD-10-CM

## 2025-07-09 DIAGNOSIS — D68.51 FACTOR V LEIDEN MUTATION (HCC): ICD-10-CM

## 2025-07-09 DIAGNOSIS — N02.B9 IGA NEPHROPATHY DETERMINED BY BIOPSY OF KIDNEY: ICD-10-CM

## 2025-07-09 PROCEDURE — 99214 OFFICE O/P EST MOD 30 MIN: CPT | Performed by: INTERNAL MEDICINE

## 2025-07-09 NOTE — ASSESSMENT & PLAN NOTE
Was to have back injection but cancelled this week.  He thought he had held it from 2nd to 5th- but was therapeutic- may have not taken it out of his med box.   Gets inr at [facility] or at hospital lab

## 2025-07-09 NOTE — PROGRESS NOTES
Assessment/Plan:     Diagnosis ICD-10-CM Associated Orders   1. Elevated INR  R79.1       2. Paroxysmal A-fib/flutter ([facility])  I48.0       3. Peripheral vascular disease, unspecified ([facility])  I73.9       4. Primary osteoarthritis of left knee  M17.12       5. Lumbar herniated disc  M51.26       6. 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  N02.B9       7. Stage 3b chronic kidney disease (CKD) ([facility])  N18.32       8. Factor V Leiden mutation ([facility])  D68.51       9. Balance disorder  R26.89           Problem List Items Addressed This Visit          Cardiovascular and Mediastinum    Paroxysmal A-fib/flutter ([facility])    Was to have back injection but cancelled this week.  He thought he had held it from 2nd to 5th- but was therapeutic- may have not taken it out of his med box.   Gets inr at [facility] or at hospital lab             Peripheral vascular disease, unspecified ([facility])    Using cane to walk with support            Musculoskeletal and Integument    Lumbar herniated disc    Doing PT for balance and stretching which is helping for hip pain and  some less leg radiculopathy  Has had prior neck surgery         Primary osteoarthritis of left knee       Genitourinary    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    Seeing nephrology-    Last crt in mid June 2.00 is improving         Stage 3b chronic kidney disease (CKD) ([facility])       Hematopoietic and Hemostatic    Factor V Leiden mutation ([facility])    Is on warfarin            Neurology/Sleep    Balance disorder     Other Visit Diagnoses         Elevated INR    -  Primary              No follow-ups on file.      Subjective:    Patient ID: Tian Garcia is a 75 y.o. male    Here for follow up   Having some balance issues and he did look up symptoms  of Parkinsonism and is concerned that he has 10/10 on his review   Dizziness, constipation, small hand writing etc  Will refer to neurology      The following portions of the patient's history were reviewed and updated as  appropriate: allergies, current medications and problem list.     Review of Systems   HENT:  Negative for congestion.         Hoarseness and less voice strength   Cardiovascular:  Negative for chest pain and palpitations.   Gastrointestinal:  Negative for abdominal pain.   Musculoskeletal:  Positive for arthralgias and gait problem.   All other systems reviewed and are negative.        Objective:    Current Medications[1]    Blood pressure 112/58, pulse 72, weight 105 kg (231 lb), SpO2 96%.     Physical Exam  Vitals and nursing note reviewed.   Constitutional:       Comments: Some loss of facial expression   HENT:      Head: Normocephalic.      Right Ear: Tympanic membrane normal. There is no impacted cerumen.      Left Ear: Tympanic membrane normal. There is no impacted cerumen.      Ears:      Comments: Bilateral hearing aides     Mouth/Throat:      Pharynx: No posterior oropharyngeal erythema.     Eyes:      General:         Right eye: No discharge.         Left eye: No discharge.       Cardiovascular:      Rate and Rhythm: Normal rate and regular rhythm.      Heart sounds: No murmur heard.  Pulmonary:      Breath sounds: No wheezing or rhonchi.   Abdominal:      Tenderness: There is no guarding or rebound.     Musculoskeletal:         General: Tenderness present.      Right lower leg: No edema.      Comments: Left  si joint tenderness and some  stiffness     Skin:     Findings: No erythema.     Neurological:      Mental Status: He is alert and oriented to person, place, and time.     Psychiatric:         Mood and Affect: Mood normal.         Thought Content: Thought content normal.         Judgment: Judgment normal.             [1]   Current Outpatient Medications:     Aspirin 81 MG CAPS, Take by mouth, Disp: , Rfl:     atorvastatin (LIPITOR) 80 mg tablet, TAKE 1 TABLET BY MOUTH EVERY EVENING, Disp: 90 tablet, Rfl: 1    DULoxetine (CYMBALTA) 60 mg delayed release capsule, TAKE 1 CAPSULE BY MOUTH EVERY DAY, Disp:  90 capsule, Rfl: 1    ezetimibe (ZETIA) 10 mg tablet, TAKE 1 TABLET BY MOUTH EVERY DAY, Disp: 90 tablet, Rfl: 3    gabapentin (NEURONTIN) 300 mg capsule, Take 1 capsule (300 mg total) by mouth 2 (two) times a day, Disp: 180 capsule, Rfl: 1    lisinopril (ZESTRIL) 2.5 mg tablet, Take 1 tablet (2.5 mg total) by mouth daily, Disp: 90 tablet, Rfl: 1    metoprolol succinate (TOPROL-XL) 25 mg 24 hr tablet, Take 1 tablet (25 mg total) by mouth daily, Disp: 180 tablet, Rfl: 3    mometasone (ELOCON) 0.1 % cream, APPLY TO AFFECTED AREA TOPICALLY EVERY DAY, Disp: 45 g, Rfl: 1    nitroglycerin (NITROSTAT) 0.4 mg SL tablet, Place 1 tablet (0.4 mg total) under the tongue every 5 (five) minutes as needed for chest pain, Disp: 30 tablet, Rfl: 0    potassium chloride (Klor-Con M20) 20 mEq tablet, Take 1 tablet (20 mEq total) by mouth daily, Disp: 90 tablet, Rfl: 3    tamsulosin (FLOMAX) 0.4 mg, Take 2 capsules (0.8 mg total) by mouth daily with dinner, Disp: 90 capsule, Rfl: 1    torsemide (DEMADEX) 20 mg tablet, Take 1.5 tablets (30 mg total) by mouth daily, Disp: 45 tablet, Rfl: 0    warfarin (COUMADIN) 5 mg tablet, TAKE ONE-HALF TABLET BY MOUTH DAILY , EXCEPT TAKE 1 TABLET BY MOUTH ON WEDNESDAY AND SATURDAY, Disp: 58 tablet, Rfl: 1    Zepbound 2.5 MG/0.5ML auto-injector, Inject 5 mg under the skin once a week, Disp: , Rfl:     zolpidem (AMBIEN) 10 mg tablet, Take 1 tablet (10 mg total) by mouth daily at bedtime as needed for sleep, Disp: 90 tablet, Rfl: 0    Accu-Chek Guide Test test strip, TEST ONCE A DAY (Patient not taking: Reported on 7/9/2025), Disp: 100 strip, Rfl: 1

## 2025-07-09 NOTE — ASSESSMENT & PLAN NOTE
Doing PT for balance and stretching which is helping for hip pain and  some less leg radiculopathy  Has had prior neck surgery

## 2025-07-10 ENCOUNTER — OFFICE VISIT (OUTPATIENT)
Dept: PHYSICAL THERAPY | Facility: CLINIC | Age: 75
End: 2025-07-10
Attending: INTERNAL MEDICINE
Payer: COMMERCIAL

## 2025-07-10 ENCOUNTER — TELEPHONE (OUTPATIENT)
Age: 75
End: 2025-07-10

## 2025-07-10 ENCOUNTER — APPOINTMENT (OUTPATIENT)
Dept: LAB | Facility: HOSPITAL | Age: 75
End: 2025-07-10
Attending: INTERNAL MEDICINE
Payer: COMMERCIAL

## 2025-07-10 ENCOUNTER — ANTICOAG VISIT (OUTPATIENT)
Dept: FAMILY MEDICINE CLINIC | Facility: HOSPITAL | Age: 75
End: 2025-07-10

## 2025-07-10 DIAGNOSIS — D68.51 FACTOR V LEIDEN MUTATION (HCC): ICD-10-CM

## 2025-07-10 DIAGNOSIS — Z86.718 HISTORY OF DVT OF LOWER EXTREMITY: ICD-10-CM

## 2025-07-10 DIAGNOSIS — R60.9 EDEMA, UNSPECIFIED TYPE: ICD-10-CM

## 2025-07-10 DIAGNOSIS — N18.32 STAGE 3B CHRONIC KIDNEY DISEASE (CKD) (HCC): Primary | ICD-10-CM

## 2025-07-10 DIAGNOSIS — M25.552 LEFT HIP PAIN: Primary | ICD-10-CM

## 2025-07-10 DIAGNOSIS — I10 PRIMARY HYPERTENSION: ICD-10-CM

## 2025-07-10 DIAGNOSIS — I25.10 ATHEROSCLEROSIS OF NATIVE CORONARY ARTERY OF NATIVE HEART WITHOUT ANGINA PECTORIS: ICD-10-CM

## 2025-07-10 DIAGNOSIS — Z79.01 ANTICOAGULATED ON WARFARIN: ICD-10-CM

## 2025-07-10 LAB
ANION GAP SERPL CALCULATED.3IONS-SCNC: 10 MMOL/L (ref 4–13)
BACTERIA UR QL AUTO: ABNORMAL /HPF
BILIRUB UR QL STRIP: NEGATIVE
BUN SERPL-MCNC: 36 MG/DL (ref 5–25)
CALCIUM SERPL-MCNC: 9.6 MG/DL (ref 8.4–10.2)
CHLORIDE SERPL-SCNC: 100 MMOL/L (ref 96–108)
CLARITY UR: CLEAR
CO2 SERPL-SCNC: 29 MMOL/L (ref 21–32)
COLOR UR: YELLOW
CREAT SERPL-MCNC: 2.3 MG/DL (ref 0.6–1.3)
CREAT UR-MCNC: 52.2 MG/DL
CREAT UR-MCNC: 52.3 MG/DL
GFR SERPL CREATININE-BSD FRML MDRD: 26 ML/MIN/1.73SQ M
GLUCOSE SERPL-MCNC: 129 MG/DL (ref 65–140)
GLUCOSE UR STRIP-MCNC: NEGATIVE MG/DL
HGB UR QL STRIP.AUTO: NEGATIVE
INR PPP: 2.02 (ref 0.85–1.19)
KETONES UR STRIP-MCNC: NEGATIVE MG/DL
LEUKOCYTE ESTERASE UR QL STRIP: NEGATIVE
MICROALBUMIN UR-MCNC: <7 MG/L
MUCOUS THREADS UR QL AUTO: ABNORMAL
NITRITE UR QL STRIP: NEGATIVE
NON-SQ EPI CELLS URNS QL MICRO: ABNORMAL /HPF
PH UR STRIP.AUTO: 5.5 [PH]
POTASSIUM SERPL-SCNC: 4 MMOL/L (ref 3.5–5.3)
PROT UR STRIP-MCNC: NEGATIVE MG/DL
PROT UR-MCNC: 9.2 MG/DL
PROT/CREAT UR: 0.2 MG/G{CREAT}
PROTHROMBIN TIME: 23.2 SECONDS (ref 12.3–15)
RBC #/AREA URNS AUTO: ABNORMAL /HPF
SODIUM SERPL-SCNC: 139 MMOL/L (ref 135–147)
SP GR UR STRIP.AUTO: 1.01 (ref 1–1.03)
UROBILINOGEN UR STRIP-ACNC: <2 MG/DL
WBC #/AREA URNS AUTO: ABNORMAL /HPF

## 2025-07-10 PROCEDURE — 97110 THERAPEUTIC EXERCISES: CPT | Performed by: PHYSICAL THERAPIST

## 2025-07-10 PROCEDURE — 80048 BASIC METABOLIC PNL TOTAL CA: CPT

## 2025-07-10 PROCEDURE — 36415 COLL VENOUS BLD VENIPUNCTURE: CPT

## 2025-07-10 PROCEDURE — 84156 ASSAY OF PROTEIN URINE: CPT

## 2025-07-10 PROCEDURE — 82043 UR ALBUMIN QUANTITATIVE: CPT

## 2025-07-10 PROCEDURE — 85610 PROTHROMBIN TIME: CPT

## 2025-07-10 PROCEDURE — 82570 ASSAY OF URINE CREATININE: CPT

## 2025-07-10 PROCEDURE — 97112 NEUROMUSCULAR REEDUCATION: CPT | Performed by: PHYSICAL THERAPIST

## 2025-07-10 PROCEDURE — 81001 URINALYSIS AUTO W/SCOPE: CPT

## 2025-07-10 NOTE — PROGRESS NOTES
Daily Note     Today's date: 7/10/2025  Patient name: Tian Garcia  : 1950  MRN: [id number]  Referring provider: Aneta Santillan DO  Dx:   Encounter Diagnosis     ICD-10-CM    1. Left hip pain  M25.552                      Subjective:  Current Status: back is feeling good today - a little stiff  New Symptoms/ Problems: NA  Response to Last Treatment: no adverse reaction - feels really good with leg press and biodex   HEP/ Activity Recommendations:  performing regularly - feeling a little less pain after  Progress Towards Goals: hip is feeling a little better with exercises - seeing improvements with strength    Objective: See treatment diary below      Assessment:   Stage: chronic   Etiology: deconditioning  Stability of Symptoms:  At Evaluation: Stable - unchanged  Global Rating of Change:   Symptom Irritability Level: Moderate  Primary Movement System Diagnosis: Motor Control  Primary Pain Phenotype: Nociceptive  Patient Specific Functional Scale:   25: lift and carry groceries without increased pain 0/10, return to walking over 1 mile 0/10, return to fishing without pain getting to the location 0/10; Total 0/30   Patient Acceptable Symptom State: unacceptable  FOTO Prediction: 50 by visit 12  FOTO Progress: 38 at evaluation   Greatest Concern: difficulty walking and standing   Current Activity Plan: added to HEP ()  Current Educational Needs: appropriate activity modifications, appropriate activity progressions, realistic expectations, timeframe for recovery, next steps to take to progress towards goals      Patient continues to respond well to manual stretching.  He noted less stiffness following.  Biodex exercises were progressed with good tolerance.  He is also improving with hip strength with leg press and step ups.  He is making excellent progress with PT.  Skilled PT continues to be required to guide progression of motor control to allow him to return to lifting and carrying for home activities  "and walking longer distances.       Plan: Continue with POC.  Monitor tolerance to last treatment and activity recommendations.  Follow up with Marielena on 7/15/25.  Continue progressing control and strength with progressive WB exercises.       Daily Treatment Diary     DX: (L) Hip Pain  Follow Up with Referring Provider:   Precautions: fall risk   CO-MORBIDITIES: Hx of neck fusion, (B) TKA, A-fib, CHF       POC Expires Reeval for Medicare to be completed Unit Limit Auth Expiration Date PT/OT/STVisit Limit   7/31/25 By visit NA  NA 12/31/25 BOMN    Completed on visit                  Auth Status DATE 7/2 7/8 7/10      NA Visit # 7 8 9       Remaining         MANUAL THERAPY         Lumbar Mobs   (B) S/L  Gr 3/4  4 min ea (B) S/L  Gr 3/4  4 min ea                                                   THERAPEUTIC EXERCISE HEP         Recumbent Bike  L1 6' L3  6 min L3  6 min                Standing Hip Flex 6/5         Standing Hip ABD 6/5         Standing Hip Ext 6/5                             Supine March  OTB  3\"x20 ea.  20x ea       TB H/L Hip ABD          S/L Clamshell   OTB  2\" 2x10 ea.         Bridge  OTB  5\"x20 5\"x20                 Knee Ext Machine          Leg Press  # x40 DL  155#  40x DL  165#  40x                          NEUROMUSCULAR REEDUCATION           Side stepping with LE cone taps          [employer]  NV Limits of stability  Skill L2  Static L8  Minimal UE support  4x Limits of stability  Skill L2  Static L8  Minimal UE support  6x  Maze control   Skill L2  Static L8  Minimal UE support  3x      FWD Mini Lunge          LAT Mini Lunge          Squat                              FWD Step Up  8\" step   1x20 ea.  8\" Step   Up and Over  20x ea 8\" Step   Up and Over  20x ea      LAT Step Up  8\" step  1x20 ea.  8\" Step   Up and Over  20x ea 8\" Step   Up and Over  20x ea                                                        THERAPEUTIC ACTIVITY                                                  GAIT TRAINING     "                                              MODALITIES

## 2025-07-11 RX ORDER — TORSEMIDE 20 MG/1
30 TABLET ORAL DAILY
Qty: 45 TABLET | Refills: 5 | Status: SHIPPED | OUTPATIENT
Start: 2025-07-11

## 2025-07-15 ENCOUNTER — APPOINTMENT (OUTPATIENT)
Dept: PHYSICAL THERAPY | Facility: CLINIC | Age: 75
End: 2025-07-15
Attending: INTERNAL MEDICINE
Payer: COMMERCIAL

## 2025-07-23 ENCOUNTER — OFFICE VISIT (OUTPATIENT)
Dept: PHYSICAL THERAPY | Facility: CLINIC | Age: 75
End: 2025-07-23
Attending: INTERNAL MEDICINE
Payer: COMMERCIAL

## 2025-07-23 DIAGNOSIS — M25.552 LEFT HIP PAIN: Primary | ICD-10-CM

## 2025-07-23 PROCEDURE — 97112 NEUROMUSCULAR REEDUCATION: CPT

## 2025-07-23 PROCEDURE — 97110 THERAPEUTIC EXERCISES: CPT

## 2025-07-23 NOTE — PROGRESS NOTES
"Daily Note     Today's date: 2025  Patient name: Tian Garcia  : 1950  MRN: [id number]  Referring provider: Aneta Santillan DO  Dx:   Encounter Diagnosis     ICD-10-CM    1. Left hip pain  M25.552                      Subjective: Pt reports missing last week due to a stomach bug.  Pt reports he went fishing yesterday for  8 hrs on a boat and was able to stand most of the time w/ c/o's only of fatigue.  States his knees/hip and back were fairly good post w/ no residual effects.  Pt states lately he has been getting OOB an walking w/o thoughts of where is the cane or stop a moment and let the head clear.  Reports feeling josh post his spinal surgery that things are progressing.       Objective: See treatment diary below      Assessment: Tolerated treatment well. Patient demonstrated fatigue post treatment and would benefit from continued PT for cont'd work on postural strengthening for improved stability and balance.  Pt        Plan: Continue per plan of care.  Progress treatment as tolerated.   Follow up w/ Ruy on .  Continue progressing control and strength with progressive WB exercises.       Daily Treatment Diary     DX: (L) Hip Pain  Follow Up with Referring Provider:   Precautions: fall risk   CO-MORBIDITIES: Hx of neck fusion, (B) TKA, A-fib, CHF       POC Expires Reeval for Medicare to be completed Unit Limit Auth Expiration Date PT/OT/STVisit Limit   25 By visit NA  NA 25 BOMN    Completed on visit                  Auth Status DATE 7/2 7/8 7/10 7/23     NA Visit # 7 8 9 10      Remaining         MANUAL THERAPY         Lumbar Mobs   (B) S/L  Gr 3/4  4 min ea (B) S/L  Gr 3/4  4 min ea nv                                                  THERAPEUTIC EXERCISE HEP         Recumbent Bike  L1 6' L3  6 min L3  6 min L3  6 min               Standing Hip Flex 6/5         Standing Hip ABD 6/5         Standing Hip Ext 6/5                             Supine March  OTB  3\"x20 ea.  20x ea       TB " "H/L Hip ABD          S/L Clamshell   OTB  2\" 2x10 ea.         Bridge  OTB  5\"x20 5\"x20                 Knee Ext Machine     35lb 20x      Leg Press  # x40 DL  155#  40x DL  165#  40x DL  165#  40x                         NEUROMUSCULAR REEDUCATION           Side stepping with LE cone taps          Biodex  NV Limits of stability  Skill L2  Static L8  Minimal UE support  4x Limits of stability  Skill L2  Static L8  Minimal UE support  6x  Maze control   Skill L2  Static L8  Minimal UE support  3x Limits of stability  Skill L2  Static L8  Minimal UE support  6x  Maze control   Skill L2  Static L8  Minimal UE support  3x     FWD Mini Lunge     10x BL     LAT Mini Lunge     10x BL     Squat                              FWD Step Up  8\" step   1x20 ea.  8\" Step   Up and Over  20x ea 8\" Step   Up and Over  20x ea nv     LAT Step Up  8\" step  1x20 ea.  8\" Step   Up and Over  20x ea 8\" Step   Up and Over  20x ea nv                                                       THERAPEUTIC ACTIVITY                                                  GAIT TRAINING                                                  MODALITIES                                             "

## 2025-07-29 ENCOUNTER — OFFICE VISIT (OUTPATIENT)
Dept: PHYSICAL THERAPY | Facility: CLINIC | Age: 75
End: 2025-07-29
Attending: INTERNAL MEDICINE
Payer: COMMERCIAL

## 2025-07-29 DIAGNOSIS — M25.552 LEFT HIP PAIN: Primary | ICD-10-CM

## 2025-07-29 PROCEDURE — 97112 NEUROMUSCULAR REEDUCATION: CPT | Performed by: PHYSICAL THERAPIST

## 2025-07-29 PROCEDURE — 97110 THERAPEUTIC EXERCISES: CPT | Performed by: PHYSICAL THERAPIST

## 2025-07-30 ENCOUNTER — APPOINTMENT (OUTPATIENT)
Dept: PHYSICAL THERAPY | Facility: CLINIC | Age: 75
End: 2025-07-30
Attending: INTERNAL MEDICINE
Payer: COMMERCIAL

## 2025-08-01 DIAGNOSIS — G47.00 INSOMNIA, UNSPECIFIED TYPE: ICD-10-CM

## 2025-08-04 RX ORDER — ZOLPIDEM TARTRATE 10 MG/1
10 TABLET ORAL
Qty: 90 TABLET | Refills: 0 | Status: SHIPPED | OUTPATIENT
Start: 2025-08-04

## 2025-08-06 ENCOUNTER — OFFICE VISIT (OUTPATIENT)
Dept: PHYSICAL THERAPY | Facility: CLINIC | Age: 75
End: 2025-08-06
Attending: INTERNAL MEDICINE
Payer: COMMERCIAL

## 2025-08-06 DIAGNOSIS — M25.552 LEFT HIP PAIN: Primary | ICD-10-CM

## 2025-08-06 PROCEDURE — 97140 MANUAL THERAPY 1/> REGIONS: CPT

## 2025-08-06 PROCEDURE — 97112 NEUROMUSCULAR REEDUCATION: CPT

## 2025-08-06 PROCEDURE — 97110 THERAPEUTIC EXERCISES: CPT

## 2025-08-07 DIAGNOSIS — G95.9 CERVICAL MYELOPATHY (HCC): ICD-10-CM

## 2025-08-07 DIAGNOSIS — I10 PRIMARY HYPERTENSION: ICD-10-CM

## 2025-08-07 DIAGNOSIS — I25.5 ISCHEMIC CARDIOMYOPATHY: ICD-10-CM

## 2025-08-08 RX ORDER — DULOXETIN HYDROCHLORIDE 60 MG/1
60 CAPSULE, DELAYED RELEASE ORAL DAILY
Qty: 90 CAPSULE | Refills: 0 | Status: SHIPPED | OUTPATIENT
Start: 2025-08-08

## 2025-08-08 RX ORDER — POTASSIUM CHLORIDE 1500 MG/1
20 TABLET, EXTENDED RELEASE ORAL DAILY
Qty: 100 TABLET | Refills: 1 | Status: SHIPPED | OUTPATIENT
Start: 2025-08-08

## 2025-08-11 ENCOUNTER — TELEPHONE (OUTPATIENT)
Age: 75
End: 2025-08-11

## 2025-08-12 ENCOUNTER — HOSPITAL ENCOUNTER (OUTPATIENT)
Facility: HOSPITAL | Age: 75
Discharge: HOME | End: 2025-08-12
Attending: INTERNAL MEDICINE
Payer: COMMERCIAL

## 2025-08-12 ENCOUNTER — OFFICE VISIT (OUTPATIENT)
Dept: PHYSICAL THERAPY | Facility: CLINIC | Age: 75
End: 2025-08-12
Attending: INTERNAL MEDICINE
Payer: COMMERCIAL

## 2025-08-19 ENCOUNTER — TELEPHONE (OUTPATIENT)
Age: 75
End: 2025-08-19

## (undated) DEVICE — NC TREK NEO™ CORONARY DILATATION CATHETER 3.50 MM X 20 MM / RAPID-EXCHANGE: Brand: NC TREK NEO™

## (undated) DEVICE — SUT VICRYL 2-0 CT-1 27 IN J259H

## (undated) DEVICE — ANTIBACTERIAL VIOLET BRAIDED (POLYGLACTIN 910), SYNTHETIC ABSORBABLE SUTURE: Brand: COATED VICRYL

## (undated) DEVICE — BETHLEHEM UNIV TOTAL KNEE, KIT: Brand: CARDINAL HEALTH

## (undated) DEVICE — CULTURE TUBE ANAEROBIC

## (undated) DEVICE — ARTHROSCOPY FLOOR MAT

## (undated) DEVICE — INTENDED FOR TISSUE SEPARATION, AND OTHER PROCEDURES THAT REQUIRE A SHARP SURGICAL BLADE TO PUNCTURE OR CUT.: Brand: BARD-PARKER ® CARBON RIB-BACK BLADES

## (undated) DEVICE — DRAPE TOWEL: Brand: CONVERTORS

## (undated) DEVICE — DUAL CUT SAGITTAL BLADE

## (undated) DEVICE — NEEDLE 25G X 1 1/2

## (undated) DEVICE — NEEDLE 18 G X 1 1/2 SAFETY

## (undated) DEVICE — DRESSING MEPILEX AG BORDER 4 X 8 IN

## (undated) DEVICE — 3M™ STERI-STRIP™ REINFORCED ADHESIVE SKIN CLOSURES, R1547, 1/2 IN X 4 IN (12 MM X 100 MM), 6 STRIPS/ENVELOPE: Brand: 3M™ STERI-STRIP™

## (undated) DEVICE — ROSEBUD DISSECTORS: Brand: DEROYAL

## (undated) DEVICE — GAUZE SPONGES,16 PLY: Brand: CURITY

## (undated) DEVICE — PREP SURGICAL PURPREP 26ML

## (undated) DEVICE — SUPPLY FEE STD

## (undated) DEVICE — SWABSTCK, BENZOIN TINCTURE, 1/PK, STRL: Brand: APLICARE

## (undated) DEVICE — 3M™ TEGADERM™ TRANSPARENT FILM DRESSING FRAME STYLE, 1626W, 4 IN X 4-3/4 IN (10 CM X 12 CM), 50/CT 4CT/CASE: Brand: 3M™ TEGADERM™

## (undated) DEVICE — PINNACLE INTRODUCER SHEATH: Brand: PINNACLE

## (undated) DEVICE — SPECIMEN CONTAINER STERILE PEEL PACK

## (undated) DEVICE — DRAPE MICROSCOPE ARMATEC 54 X 150IN F/LEICA

## (undated) DEVICE — GLOVE INDICATOR PI UNDERGLOVE SZ 8.5 BLUE

## (undated) DEVICE — SUT VICRYL PLUS 3-0 RB-1 CR/8 18 IN VCP713D

## (undated) DEVICE — BIPOLAR CORD DISP

## (undated) DEVICE — TIBURON SPLIT SHEET: Brand: CONVERTORS

## (undated) DEVICE — PENCIL ELECTROSURG E-Z CLEAN -0035H

## (undated) DEVICE — HOOD WITH PEEL AWAY FACE SHIELD: Brand: T7PLUS

## (undated) DEVICE — DRAPE SHEET THREE QUARTER

## (undated) DEVICE — SPONGE PVP SCRUB WING STERILE

## (undated) DEVICE — JP PERF DRN SIL FLT 7MM FULL: Brand: CARDINAL HEALTH

## (undated) DEVICE — DRILL BIT G3606010 2.4MM

## (undated) DEVICE — GLOVE SRG BIOGEL 8

## (undated) DEVICE — SUT PROLENE 3-0 V-7 36 IN 8976H

## (undated) DEVICE — ABDOMINAL PAD: Brand: DERMACEA

## (undated) DEVICE — GUIDEWIRE WHOLEY HI TORQUE INTERM MOD J .035 145CM

## (undated) DEVICE — COOL TEMP PAD

## (undated) DEVICE — GLIDESHEATH BASIC HYDROPHILIC COATED INTRODUCER SHEATH: Brand: GLIDESHEATH

## (undated) DEVICE — ACE WRAP 6 IN UNSTERILE

## (undated) DEVICE — TOOL 14MH30 LEGEND 14CM 3MM: Brand: MIDAS REX ™

## (undated) DEVICE — PROXIMATE PLUS MD MULTI-DIRECTIONAL RELEASE SKIN STAPLERS CONTAINS 35 STAINLESS STEEL STAPLES APPROXIMATE CLOSED DIMENSIONS: 6.9MM X 3.9MM WIDE: Brand: PROXIMATE

## (undated) DEVICE — PAD CAST 6 IN COTTON NON STERILE

## (undated) DEVICE — ACE WRAP 6 IN STERILE

## (undated) DEVICE — HEMOSTATIC MATRIX SURGIFLO 8ML W/THROMBIN

## (undated) DEVICE — SUT VICRYL 1 CT-1 27 IN J261H

## (undated) DEVICE — INTENDED FOR TISSUE SEPARATION, AND OTHER PROCEDURES THAT REQUIRE A SHARP SURGICAL BLADE TO PUNCTURE OR CUT.: Brand: BARD-PARKER SAFETY BLADES SIZE 15, STERILE

## (undated) DEVICE — SYRINGE 30ML LL

## (undated) DEVICE — HANDPIECE SET WITH RETRACTABLE COAXIAL FAN SPRAY TIP AND SUCTION TUBE: Brand: INTERPULSE

## (undated) DEVICE — PAD GROUNDING DUAL ADULT

## (undated) DEVICE — BETHLEHEM UNIVERSAL SPINE, KIT: Brand: CARDINAL HEALTH

## (undated) DEVICE — 3M™ TEGADERM™ TRANSPARENT FILM DRESSING FRAME STYLE, 1624W, 2-3/8 IN X 2-3/4 IN (6 CM X 7 CM), 100/CT 4CT/CASE: Brand: 3M™ TEGADERM™

## (undated) DEVICE — JACKSON-PRATT 100CC BULB RESERVOIR: Brand: CARDINAL HEALTH

## (undated) DEVICE — DRAPE EQUIPMENT RF WAND

## (undated) DEVICE — CAPIT KNEE ATTUNE RP W/DOM

## (undated) DEVICE — BIPOLAR SEALER 23-113-1 AQM 2.3: Brand: AQUAMANTYS™

## (undated) DEVICE — SILVER-COATED ANTIMICROBIAL BARRIER DRESSING: Brand: ACTICOAT   4" X 8"

## (undated) DEVICE — MAYFIELD® DISPOSABLE ADULT SKULL PIN (PLASTIC BASE): Brand: MAYFIELD®

## (undated) DEVICE — TR BAND RADIAL ARTERY COMPRESSION DEVICE: Brand: TR BAND

## (undated) DEVICE — TUBING SUCTION 5MM X 12 FT

## (undated) DEVICE — IMPERVIOUS STOCKINETTE: Brand: DEROYAL

## (undated) DEVICE — CULTURE TUBE AEROBIC

## (undated) DEVICE — MONITORING SPINAL IMPULSE CASE FEE

## (undated) DEVICE — CABLE CATH CONNECTION FARASTAR

## (undated) DEVICE — Device: Brand: WEBSTER CS

## (undated) DEVICE — JP 3-SPRING RES W/10FR PVC DRAIN/TR: Brand: CARDINAL HEALTH

## (undated) DEVICE — SKN PRP WNG SPNGE PVP SCRB STR: Brand: MEDLINE INDUSTRIES, INC.

## (undated) DEVICE — THE SIMPULSE SOLO SYSTEM WITH ULTREX RETRACTABLE SPLASH SHIELD TIP: Brand: SIMPULSE SOLO

## (undated) DEVICE — 3M™ IOBAN™ 2 ANTIMICROBIAL INCISE DRAPE 6650EZ: Brand: IOBAN™ 2

## (undated) DEVICE — CATH ABLATION FARAWAVE PULSED FIELD 31MM

## (undated) DEVICE — SET SCREW 3600215 M6 SETSCREW
Type: IMPLANTABLE DEVICE | Site: POSTERIOR CERVICAL | Status: NON-FUNCTIONAL
Brand: INFINITY™ OCCIPITOCERVICAL UPPER THORACIC SYSTEM

## (undated) DEVICE — RECIPROCATING BLADE, DOUBLE SIDED, OFFSET  (70.0 X 0.64 X 12.6MM)

## (undated) DEVICE — NEURO SURGICAL CLIPPER BLADE

## (undated) DEVICE — HOOD: Brand: T7PLUS

## (undated) DEVICE — PLUMEPEN PRO 10FT

## (undated) DEVICE — ELECTRODE BLADE MOD E-Z CLEAN 2.5IN 6.4CM -0012M

## (undated) DEVICE — PADDING CAST 4 IN  COTTON STRL

## (undated) DEVICE — TREK CORONARY DILATATION CATHETER 3.0 MM X 8 MM / RAPID-EXCHANGE: Brand: TREK

## (undated) DEVICE — Device: Brand: PENTARAY NAV

## (undated) DEVICE — NEURO PATTIES 1/2 X 3

## (undated) DEVICE — SHEATH STEERABLE FARADRIVE

## (undated) DEVICE — SOUNDSTAR ECO 8F G CATHETER: Brand: SOUNDSTAR

## (undated) DEVICE — DRAPE SHEET X-LG

## (undated) DEVICE — INSTRUMENT 874-445 ML 9X11MMDP CTTR 5MM: Brand: MEDTRONIC REUSABLE INSTRUMENT

## (undated) DEVICE — ELECTRODE BLADE MOD E-Z CLEAN 4IN -0014AM

## (undated) DEVICE — RUNTHROUGH NS EXTRA FLOPPY PTCA GUIDEWIRE: Brand: RUNTHROUGH

## (undated) DEVICE — COBAN 4 IN STERILE

## (undated) DEVICE — SNAP KOVER: Brand: UNBRANDED

## (undated) DEVICE — DRILL BIT 7080510 11 MM DRILL BIT S

## (undated) DEVICE — 1840 FOAM BLOCK NEEDLE COUNTER: Brand: DEVON

## (undated) DEVICE — BETADINE OINTMENT FOIL PACK

## (undated) DEVICE — DRAPE SURGIKIT SADDLE BAG

## (undated) DEVICE — DISPOSABLE EQUIPMENT COVER: Brand: SMALL TOWEL DRAPE

## (undated) DEVICE — Device: Brand: REFERENCE PATCH CARTO 3

## (undated) DEVICE — DRESSING MEPILEX AG BORDER 4 X 4 IN

## (undated) DEVICE — SPONGE SCRUB 4 PCT CHLORHEXIDINE

## (undated) DEVICE — 1 X VERSACROSS CONNECT TRANSSEPTAL DILATOR;  1 X VERSACROSS RF WIRE (INCLUDING 1 X CONNECTOR CABLE (SINGLE USE)): Brand: VERSACROSS CONNECT ACCESS SOLUTION FOR FARADRIVE

## (undated) DEVICE — PVC URETHRAL CATHETER: Brand: DOVER

## (undated) DEVICE — PROXIMATE SKIN STAPLE EXTRACTORS: Brand: PROXIMATE

## (undated) DEVICE — TRAY FOLEY 16FR URIMETER SURESTEP

## (undated) DEVICE — TRAY FOLEY 16FR URIMETER SILICONE SURESTEP

## (undated) DEVICE — TOOL 9MH30 LEGEND 9CM 3MM MH: Brand: MIDAS REX

## (undated) DEVICE — RADIFOCUS OPTITORQUE ANGIOGRAPHIC CATHETER: Brand: OPTITORQUE

## (undated) DEVICE — TREK CORONARY DILATATION CATHETER 2.50 MM X 15 MM / RAPID-EXCHANGE: Brand: TREK

## (undated) DEVICE — ROSEN CURVED WIRE GUIDE: Brand: ROSEN

## (undated) DEVICE — CATH GUIDE LAUNCHER 6FR EBU 3.5

## (undated) DEVICE — CAPIT KNEE ATTUNE FB W/DOM

## (undated) DEVICE — 3 BONE CEMENT MIXER: Brand: MIXEVAC

## (undated) DEVICE — PAD CAST 4 IN COTTON NON STERILE

## (undated) DEVICE — SUT SILK 2-0 18 IN A185H

## (undated) DEVICE — BOWL ASSY BM210 DUAL BLADE DISPOSABLE: Brand: MIDAS REX™